# Patient Record
Sex: FEMALE | Race: WHITE | NOT HISPANIC OR LATINO | Employment: OTHER | URBAN - METROPOLITAN AREA
[De-identification: names, ages, dates, MRNs, and addresses within clinical notes are randomized per-mention and may not be internally consistent; named-entity substitution may affect disease eponyms.]

---

## 2017-01-13 ENCOUNTER — HOSPITAL ENCOUNTER (OUTPATIENT)
Dept: CARDIOLOGY | Facility: HOSPITAL | Age: 82
Setting detail: RECURRING SERIES
Discharge: HOME | End: 2017-01-13

## 2017-01-13 PROCEDURE — 36416 COLLJ CAPILLARY BLOOD SPEC: CPT | Performed by: INTERNAL MEDICINE

## 2017-01-13 PROCEDURE — 85610 PROTHROMBIN TIME: CPT | Performed by: INTERNAL MEDICINE

## 2017-01-19 ENCOUNTER — CLINICAL SUPPORT NO REQUIREMENTS (OUTPATIENT)
Dept: CARDIOLOGY | Facility: CLINIC | Age: 82
End: 2017-01-19

## 2017-01-19 DIAGNOSIS — I49.5 SICK SINUS SYNDROME (HCC): Primary | ICD-10-CM

## 2017-01-19 PROCEDURE — 93294 REM INTERROG EVL PM/LDLS PM: CPT | Performed by: INTERNAL MEDICINE

## 2017-01-19 PROCEDURE — 93296 REM INTERROG EVL PM/IDS: CPT | Performed by: INTERNAL MEDICINE

## 2017-01-27 ENCOUNTER — HOSPITAL ENCOUNTER (OUTPATIENT)
Dept: CARDIOLOGY | Facility: HOSPITAL | Age: 82
Setting detail: RECURRING SERIES
Discharge: HOME | End: 2017-01-27

## 2017-01-27 PROCEDURE — 85610 PROTHROMBIN TIME: CPT | Performed by: INTERNAL MEDICINE

## 2017-01-27 PROCEDURE — 36416 COLLJ CAPILLARY BLOOD SPEC: CPT | Performed by: INTERNAL MEDICINE

## 2017-02-06 ENCOUNTER — LAB (OUTPATIENT)
Dept: LAB | Facility: HOSPITAL | Age: 82
End: 2017-02-06

## 2017-02-06 DIAGNOSIS — Z96.653 STATUS POST TOTAL BILATERAL KNEE REPLACEMENT: ICD-10-CM

## 2017-02-06 LAB
ALBUMIN SERPL-MCNC: 4.2 G/DL (ref 3.5–5.2)
ALBUMIN/GLOB SERPL: 1.6 G/DL
ALP SERPL-CCNC: 67 U/L (ref 39–117)
ALT SERPL W P-5'-P-CCNC: 18 U/L (ref 1–33)
ANION GAP SERPL CALCULATED.3IONS-SCNC: 14.8 MMOL/L
AST SERPL-CCNC: 30 U/L (ref 1–32)
BILIRUB SERPL-MCNC: 0.7 MG/DL (ref 0.1–1.2)
BUN BLD-MCNC: 29 MG/DL (ref 8–23)
BUN/CREAT SERPL: 22.3 (ref 7–25)
CALCIUM SPEC-SCNC: 9.3 MG/DL (ref 8.6–10.5)
CHLORIDE SERPL-SCNC: 104 MMOL/L (ref 98–107)
CO2 SERPL-SCNC: 25.2 MMOL/L (ref 22–29)
CREAT BLD-MCNC: 1.3 MG/DL (ref 0.57–1)
CRP SERPL-MCNC: 0.12 MG/DL (ref 0–0.5)
ERYTHROCYTE [SEDIMENTATION RATE] IN BLOOD: 3 MM/HR (ref 0–30)
GFR SERPL CREATININE-BSD FRML MDRD: 39 ML/MIN/1.73
GLOBULIN UR ELPH-MCNC: 2.6 GM/DL
GLUCOSE BLD-MCNC: 65 MG/DL (ref 65–99)
POTASSIUM BLD-SCNC: 4.5 MMOL/L (ref 3.5–5.2)
PROT SERPL-MCNC: 6.8 G/DL (ref 6–8.5)
SODIUM BLD-SCNC: 144 MMOL/L (ref 136–145)

## 2017-02-06 PROCEDURE — 36415 COLL VENOUS BLD VENIPUNCTURE: CPT

## 2017-02-06 PROCEDURE — 85652 RBC SED RATE AUTOMATED: CPT

## 2017-02-06 PROCEDURE — 80053 COMPREHEN METABOLIC PANEL: CPT

## 2017-02-06 PROCEDURE — 86140 C-REACTIVE PROTEIN: CPT

## 2017-02-17 ENCOUNTER — HOSPITAL ENCOUNTER (OUTPATIENT)
Dept: CARDIOLOGY | Facility: HOSPITAL | Age: 82
Setting detail: RECURRING SERIES
Discharge: HOME | End: 2017-02-17

## 2017-02-17 ENCOUNTER — OFFICE VISIT (OUTPATIENT)
Dept: ORTHOPEDIC SURGERY | Facility: CLINIC | Age: 82
End: 2017-02-17

## 2017-02-17 VITALS — BODY MASS INDEX: 40.12 KG/M2 | WEIGHT: 235 LBS | HEIGHT: 64 IN

## 2017-02-17 DIAGNOSIS — Z96.651 HISTORY OF TOTAL KNEE ARTHROPLASTY, RIGHT: Primary | ICD-10-CM

## 2017-02-17 PROCEDURE — 73562 X-RAY EXAM OF KNEE 3: CPT | Performed by: ORTHOPAEDIC SURGERY

## 2017-02-17 PROCEDURE — 85610 PROTHROMBIN TIME: CPT

## 2017-02-17 PROCEDURE — 99213 OFFICE O/P EST LOW 20 MIN: CPT | Performed by: ORTHOPAEDIC SURGERY

## 2017-02-17 PROCEDURE — 36416 COLLJ CAPILLARY BLOOD SPEC: CPT

## 2017-02-17 RX ORDER — DOXYCYCLINE HYCLATE 100 MG/1
100 TABLET, DELAYED RELEASE ORAL DAILY
Qty: 90 TABLET | Refills: 3 | Status: SHIPPED | OUTPATIENT
Start: 2017-02-17 | End: 2017-02-21

## 2017-02-17 NOTE — PROGRESS NOTES
Patient: Caitlyn Longoria  YOB: 1934 82 y.o. female  Medical Record Number: [id number]    Chief Complaints:   Chief Complaint   Patient presents with   • Right Knee - Follow-up       History of Present Illness:Caitlyn Longoria is a 82 y.o. female who presents for follow-up of  right total knee replacement she is about 2 and half years out from an infection treated by I&D poly-change.  She is currently on doxycycline for by mouth suppression which she tried to come off the doxycycline but developed increasing pain within the knee she's back on 100 mg daily.  She is doing well she has no complaints with regard to the knee.    Allergies:   Allergies   Allergen Reactions   • Amoxicillin    • Clarithromycin      Other reaction(s): Headache   • Diclofenac    • Diphenhydramine    • Esomeprazole    • Levalbuterol    • Levocetirizine    • Lipitor [Atorvastatin]    • Omeprazole    • Quinapril Swelling and Confusion   • Sulfa Antibiotics    • Sulindac      Other reaction(s): Headache   • Valdecoxib Irritability   • Prednisone Rash       Medications:   Current Outpatient Prescriptions   Medication Sig Dispense Refill   • acetaminophen (TYLENOL) 650 MG 8 hr tablet Take 650 mg by mouth. PRN     • azelastine (ASTEPRO) 0.15 % solution nasal spray 2 sprays into each nostril 2 (two) times a day.     • azelastine (OPTIVAR) 0.05 % ophthalmic solution Apply  to eye 2 (two) times a day.     • BIOTIN MAXIMUM STRENGTH PO Take  by mouth Daily.     • bumetanide (BUMEX) 1 MG tablet Take  by mouth daily.     • cefdinir (OMNICEF) 300 MG capsule Take 300 mg by mouth 2 (Two) Times a Day.     • cetirizine (ZyrTEC) 10 MG tablet Take  by mouth daily.     • clotrimazole-betamethasone (LOTRISONE) 1-0.05 % cream Apply  topically Daily.     • Cyanocobalamin (B-12) 1000 MCG/ML kit Inject  as directed 1 (one) time per week.     • doxycycline (DORYX) 100 MG enteric coated tablet Take 1 tablet by mouth 2 (two) times a day. (Patient taking  "differently: Take 100 mg by mouth Daily.) 60 tablet 3   • fenofibrate (TRICOR) 145 MG tablet Take 145 mg by mouth Daily.     • flunisolide (NASALIDE) 25 MCG/ACT (0.025%) solution nasal spray Inhale 2 sprays every 12 (twelve) hours.     • glipiZIDE (GLUCOTROL) 5 MG tablet Take 5 mg by mouth daily.     • melatonin tablet Take 1 mg by mouth Every Night.     • oxazepam (SERAX) 10 MG capsule Take  by mouth Daily.     • oxybutynin XL (DITROPAN-XL) 10 MG 24 hr tablet Take 10 mg by mouth Daily.     • pantoprazole (PROTONIX) 40 MG EC tablet Take  by mouth daily.     • potassium chloride (K-DUR,KLOR-CON) 20 MEQ CR tablet Take 20 mEq by mouth Daily.     • warfarin (COUMADIN) 4 MG tablet TAKE ONE TABLET BY MOUTH DAILY OR AS DIRECTED 90 tablet 1     No current facility-administered medications for this visit.          The following portions of the patient's history were reviewed and updated as appropriate: allergies, current medications, past family history, past medical history, past social history, past surgical history and problem list.    Review of Systems:   A 14 point review of systems was performed. All systems negative except pertinent positives/negative listed in HPI above    Physical Exam:   Vitals:    02/17/17 1528   Weight: 235 lb (107 kg)   Height: 64\" (162.6 cm)       General: A and O x 3, ASA, NAD    SCLERA:    Normal    DENTITION:   Normal  The knee shows good range of motion there is no joint effusion there is no erythema or warmth is nontender with range of motion there is no ligamentous instability    Radiology:  Xrays 3views right knee (ap,lateral, sunrise) were ordered and reviewed for evaluation of knee pain demonstratinga well positioned knee replacement without evidence of wear, loosening or osteolysis  todays xrays were compared to previous xrays and demonstrate no change    Assessment/Plan:  Right total knee replacement about 2-1/2 years out from irrigation debridement and poly-change.  She is currently " managed on doxycycline 100 mg once daily.  When she discontinued it after about 4 days she had pain so for the foreseeable future we will have her on doxycycline 100 mg once daily.  Her most recent sedimentation rate and C-reactive proteins are normal from a couple weeks ago so overall everything looks very good.  We will keep her on her current antibiotic regimen would like to see her back in 1 year sooner should she have increased pain redness or change in symptoms about either knee.

## 2017-02-20 ENCOUNTER — TELEPHONE (OUTPATIENT)
Dept: ORTHOPEDIC SURGERY | Facility: CLINIC | Age: 82
End: 2017-02-20

## 2017-02-21 RX ORDER — DOXYCYCLINE HYCLATE 100 MG
100 TABLET ORAL 2 TIMES DAILY
Qty: 90 TABLET | Refills: 3 | Status: SHIPPED | OUTPATIENT
Start: 2017-02-21 | End: 2017-04-07

## 2017-02-22 ENCOUNTER — TELEPHONE (OUTPATIENT)
Dept: ORTHOPEDIC SURGERY | Facility: CLINIC | Age: 82
End: 2017-02-22

## 2017-02-22 ENCOUNTER — TELEPHONE (OUTPATIENT)
Dept: CARDIOLOGY | Facility: CLINIC | Age: 82
End: 2017-02-22

## 2017-02-22 NOTE — TELEPHONE ENCOUNTER
SPOKE WITH JASPER AT [facility]    DOXYCYCLINE 100 MG    APPROVED FOR LIFETIME OF PATIENT PLAN  2/20/17-2/31/17    SPOKE WITH MELVA AT [facility]

## 2017-02-22 NOTE — TELEPHONE ENCOUNTER
Pt notified.  She states when she finishes the Tricor she will have  May change her to Faisal./ KOBI

## 2017-02-22 NOTE — TELEPHONE ENCOUNTER
Patient sent letter asking you would recommend changing from Tricor to Crestor.   It is the same cost to her and she is with the understanding the Crestor is best.  (Please see patient's letter in your inbox)/ KOBI    Patient's phone number is [phone]

## 2017-02-28 RX ORDER — DOXYCYCLINE HYCLATE 100 MG
100 TABLET ORAL 2 TIMES DAILY
Qty: 90 TABLET | Refills: 3 | Status: CANCELLED | OUTPATIENT
Start: 2017-02-28 | End: 2017-04-14

## 2017-03-17 ENCOUNTER — HOSPITAL ENCOUNTER (OUTPATIENT)
Dept: CARDIOLOGY | Facility: HOSPITAL | Age: 82
Setting detail: RECURRING SERIES
Discharge: HOME | End: 2017-03-17

## 2017-03-17 PROCEDURE — 85610 PROTHROMBIN TIME: CPT

## 2017-03-17 PROCEDURE — 36416 COLLJ CAPILLARY BLOOD SPEC: CPT

## 2017-03-24 ENCOUNTER — OFFICE VISIT (OUTPATIENT)
Dept: ORTHOPEDIC SURGERY | Facility: CLINIC | Age: 82
End: 2017-03-24

## 2017-03-24 VITALS — HEIGHT: 64 IN | TEMPERATURE: 97.6 F | BODY MASS INDEX: 39.95 KG/M2 | WEIGHT: 234 LBS

## 2017-03-24 DIAGNOSIS — G56.02 CARPAL TUNNEL SYNDROME OF LEFT WRIST: ICD-10-CM

## 2017-03-24 DIAGNOSIS — M54.2 NECK AND SHOULDER PAIN: Primary | ICD-10-CM

## 2017-03-24 DIAGNOSIS — M25.519 NECK AND SHOULDER PAIN: Primary | ICD-10-CM

## 2017-03-24 DIAGNOSIS — M19.019 ARTHROPATHY OF SHOULDER REGION: ICD-10-CM

## 2017-03-24 PROCEDURE — 99213 OFFICE O/P EST LOW 20 MIN: CPT | Performed by: NURSE PRACTITIONER

## 2017-03-24 PROCEDURE — 72040 X-RAY EXAM NECK SPINE 2-3 VW: CPT | Performed by: NURSE PRACTITIONER

## 2017-03-24 PROCEDURE — 73030 X-RAY EXAM OF SHOULDER: CPT | Performed by: NURSE PRACTITIONER

## 2017-03-24 PROCEDURE — 20610 DRAIN/INJ JOINT/BURSA W/O US: CPT | Performed by: NURSE PRACTITIONER

## 2017-03-24 RX ORDER — METHYLPREDNISOLONE ACETATE 80 MG/ML
80 INJECTION, SUSPENSION INTRA-ARTICULAR; INTRALESIONAL; INTRAMUSCULAR; SOFT TISSUE
Status: COMPLETED | OUTPATIENT
Start: 2017-03-24 | End: 2017-03-24

## 2017-03-24 RX ORDER — MELATONIN
1000 DAILY
COMMUNITY
End: 2017-10-18 | Stop reason: ALTCHOICE

## 2017-03-24 RX ORDER — ROSUVASTATIN CALCIUM 10 MG/1
10 TABLET, COATED ORAL DAILY
Status: ON HOLD | COMMUNITY
Start: 2017-03-15 | End: 2017-12-07 | Stop reason: ALTCHOICE

## 2017-03-24 RX ORDER — CHLORAL HYDRATE 500 MG
1000 CAPSULE ORAL 2 TIMES DAILY WITH MEALS
COMMUNITY
End: 2017-12-10 | Stop reason: HOSPADM

## 2017-03-24 RX ORDER — LIDOCAINE HYDROCHLORIDE 10 MG/ML
2 INJECTION, SOLUTION INFILTRATION; PERINEURAL
Status: COMPLETED | OUTPATIENT
Start: 2017-03-24 | End: 2017-03-24

## 2017-03-24 RX ORDER — BUPIVACAINE HYDROCHLORIDE 5 MG/ML
2 INJECTION, SOLUTION PERINEURAL
Status: COMPLETED | OUTPATIENT
Start: 2017-03-24 | End: 2017-03-24

## 2017-03-24 RX ADMIN — BUPIVACAINE HYDROCHLORIDE 2 ML: 5 INJECTION, SOLUTION PERINEURAL at 10:00

## 2017-03-24 RX ADMIN — LIDOCAINE HYDROCHLORIDE 2 ML: 10 INJECTION, SOLUTION INFILTRATION; PERINEURAL at 10:00

## 2017-03-24 RX ADMIN — METHYLPREDNISOLONE ACETATE 80 MG: 80 INJECTION, SUSPENSION INTRA-ARTICULAR; INTRALESIONAL; INTRAMUSCULAR; SOFT TISSUE at 10:00

## 2017-03-24 NOTE — PROGRESS NOTES
Patient Name: Caitlyn Longoria   YOB: 1934  Referring Primary Care Physician: Mele Hays MD      Chief Complaint:    Chief Complaint   Patient presents with   • Left Arm - Establish Care, Pain    left wrist and neck pain    Subjective:    HPI:   Caitlyn Longoria is a pleasant 82 y.o. year old who presents today for evaluation of   Chief Complaint   Patient presents with   • Left Arm - Establish Care, Pain   .  SHOULDER: left shoulder pain that is Acute on chronic. The pain is described as aching, shooting and tingling, and located at global, acromioclavicular joint, neck.  The pain is exacerbated by repetitive movements, overhead movements, and lying on the shoulder.  Mechanism of injury: None. Previous treatment has included cortisone injections  OTC Tylenol This acute flare has been bad for about 3 months now and is waking her up at night.  She is also c/o numbness and tingling in her left hand and some cervical neck pain that is intermittent and achy and relieved some by chiropracter adjustment.     This problem is not new to this examiner.     Medications:   Home Medications:  Current Outpatient Prescriptions on File Prior to Visit   Medication Sig   • acetaminophen (TYLENOL) 650 MG 8 hr tablet Take 650 mg by mouth. PRN   • azelastine (ASTEPRO) 0.15 % solution nasal spray 2 sprays into each nostril 2 (two) times a day.   • azelastine (OPTIVAR) 0.05 % ophthalmic solution Apply  to eye 2 (two) times a day.   • BIOTIN MAXIMUM STRENGTH PO Take  by mouth Daily.   • bumetanide (BUMEX) 1 MG tablet Take  by mouth daily.   • cetirizine (ZyrTEC) 10 MG tablet Take  by mouth daily.   • clotrimazole-betamethasone (LOTRISONE) 1-0.05 % cream Apply  topically Daily.   • Cyanocobalamin (B-12) 1000 MCG/ML kit Inject  as directed 1 (one) time per week.   • doxycycline (VIBRAMYICN) 100 MG tablet Take 1 tablet by mouth 2 (Two) Times a Day for 90 doses.   • flunisolide (NASALIDE) 25 MCG/ACT (0.025%) solution  nasal spray Inhale 2 sprays every 12 (twelve) hours.   • glipiZIDE (GLUCOTROL) 5 MG tablet Take 5 mg by mouth daily.   • melatonin tablet Take 1 mg by mouth Every Night.   • oxazepam (SERAX) 10 MG capsule Take  by mouth Daily.   • oxybutynin XL (DITROPAN-XL) 10 MG 24 hr tablet Take 10 mg by mouth Daily.   • pantoprazole (PROTONIX) 40 MG EC tablet Take  by mouth daily.   • potassium chloride (K-DUR,KLOR-CON) 20 MEQ CR tablet Take 20 mEq by mouth Daily.   • warfarin (COUMADIN) 4 MG tablet TAKE ONE TABLET BY MOUTH DAILY OR AS DIRECTED   • cefdinir (OMNICEF) 300 MG capsule Take 300 mg by mouth 2 (Two) Times a Day.   • [DISCONTINUED] fenofibrate (TRICOR) 145 MG tablet Take 145 mg by mouth Daily.     No current facility-administered medications on file prior to visit.      Current Medications:  Scheduled Meds:  Continuous Infusions:  No current facility-administered medications for this visit.   PRN Meds:.    I have reviewed the patient's medical history in detail and updated the computerized patient record.  Review and summarization of old records includes:    Past Medical History:   Diagnosis Date   • Abdominal pain    • Cough    • Diarrhea    • Difficulty breathing    • Dizziness    • DM type 2 (diabetes mellitus, type 2)         Past Surgical History:   Procedure Laterality Date   • CARDIAC CATHETERIZATION     • CHOLECYSTECTOMY     • CORONARY STENT PLACEMENT     • HERNIA REPAIR     • HYSTERECTOMY     • PACEMAKER IMPLANTATION     • REPLACEMENT TOTAL KNEE          Social History     Occupational History   • Not on file.     Social History Main Topics   • Smoking status: Never Smoker   • Smokeless tobacco: Never Used   • Alcohol use No   • Drug use: No   • Sexual activity: Not on file    Social History     Social History Narrative      No family history on file.    ROS: 14 point review of systems was performed and all other systems were reviewed and are negative except for documented findings in HPI and today's  encounter.     Allergies:   Allergies   Allergen Reactions   • Ahist [Chlorpheniramine]    • Amoxicillin    • Clarithromycin      Other reaction(s): Headache   • Diclofenac      Voltaren   • Diphenhydramine      Benadryl   • Esomeprazole      Nexium   • Ibuprofen Other (See Comments)     Does not recall    • Levalbuterol      Xopenex   • Levocetirizine      Xyzal   • Lipitor [Atorvastatin]    • Lodine [Etodolac]    • Omeprazole      Prilosec   • Pravachol [Pravastatin]    • Quinapril Swelling and Confusion     Accupril   • Sulfa Antibiotics    • Sulindac      Other reaction(s): Headache   • Valdecoxib Irritability   • Prednisone Rash     Constitutional:  Denies fever, shaking or chills   Eyes:  Denies change in visual acuity   HENT:  Denies nasal congestion or sore throat   Respiratory:  Denies cough or shortness of breath   Cardiovascular:  Denies chest pain or severe LE edema   GI:  Denies abdominal pain, nausea, vomiting, bloody stools or diarrhea   Musculoskeletal:  Numbness, tingling, or loss of motor function only as noted above in history of present illness.  : Denies painful urination or hematuria  Integument:  Denies rash, lesion or ulceration   Neurologic:  Denies headache or focal weakness  Endocrine:  Denies lymphadenopathy  Psych:  Denies confusion or change in mental status   Hem:  Denies active bleeding    Objective:    Physical Exam: 82 y.o. female  Body mass index is 40.17 kg/(m^2).  Vitals:    03/24/17 0827   Temp: 97.6 °F (36.4 °C)     Vital signs reviewed.   General Appearance:    Alert, cooperative, in no acute distress                  Eyes: conjunctiva clear  ENT: external ears and nose atraumatic  CV: no peripheral edema  Resp: normal respiratory effort  Skin: no rashes or wounds; normal turgor  Psych: mood and affect appropriate  Lymph: no nodes appreciated  Neuro: gross sensation intact  Vascular:  Palpable peripheral pulse in noted extremity  Musculoskeletal Extremities: SHOULDER Exam:  tenderness over the glenohumeral joint, positive for tenderness about the glenohumeral joint, positive for tenderness over the acromioclavicular joint, positive for impingement sign, diminished sensation in hand with Tinnel's test positive, pain with mild percussion of dorsal surface of wrist on extension, cap refill <3, also pain in cervical neck that is positional with radiation down left arm., diminished motor function in overhead motion and radial pulse intact.    Radiology:   Imaging done today and discussed at length with the patient:    Indication: pain related symptoms,  Views: 3V AP, SCAPULA Y AND AXILLARY left shoulder(s) and 2V AP&LAT, of cervical spine .    Findings: rotator cuff arthropathy  and a/c joint arthritis, cervical spine arthritis with degenerative disc disease and C5-C6 arthritis  Comparison views: viewed last xray done in the office.       Assessment:   Patient Active Problem List   Diagnosis   • Neck and shoulder pain   • Arthropathy of shoulder region   • Carpal tunnel syndrome of left wrist        Large Joint Arthrocentesis  Date/Time: 3/24/2017 10:00 AM  Consent given by: patient  Site marked: site marked  Timeout: Immediately prior to procedure a time out was called to verify the correct patient, procedure, equipment, support staff and site/side marked as required   Supporting Documentation  Indications: pain and joint swelling   Procedure Details  Location: shoulder - L subacromial bursa  Preparation: Patient was prepped and draped in the usual sterile fashion  Needle size: 22 G  Approach: anterolateral  Medications administered: 2 mL bupivacaine; 2 mL lidocaine 1 %; 80 mg methylPREDNISolone acetate 80 MG/ML  Patient tolerance: patient tolerated the procedure well with no immediate complications             Plan: Biomechanics of pertinent body area discussed.  Risks, benefits, alternatives, comparisons, and complications of accepted medicines, injections, recommendations, surgical  procedures, and therapies explained and education provided in laymen's terms. The patient was given the opportunity to ask questions and they were answerved to their satisfaction.   Natural history and expected course discussed. Questions answered.  Advice on benefits of, and types of regular/moderate exercise.  Lifestyle measures for weight loss with biomechanical explanations for weight loss and how this affects orthopedic condition.  Cortisone Injection for DIAGNOSTIC and THERAPUTIC purposes.  PT referral.  30 min spent face to face with patient >25 min spent counseling about natural history and expected course of assessed complaint. Questions answered.  Left wrist brace for carpul tunnel, will do cortisone inj in left shoulder, and see how much this helps with the symptoms coming from her neck as well. will order some PT for the neck and shoulders, if not improved should follow up with Dr. Addison for her neck.       3/24/2017  MJR

## 2017-03-24 NOTE — PATIENT INSTRUCTIONS
Shoulder Range of Motion Exercises  Shoulder range of motion (ROM) exercises are designed to keep the shoulder moving freely. They are often recommended for people who have shoulder pain.  MOVEMENT EXERCISE  When you are able, do this exercise 5-6 days per week, or as told by your health care provider. Work toward doing 2 sets of 10 swings.  Pendulum Exercise  How To Do This Exercise Lying Down  1. Lie face-down on a bed with your abdomen close to the side of the bed.  2. Let your arm hang over the side of the bed.  3. Relax your shoulder, arm, and hand.  4. Slowly and gently swing your arm forward and back. Do not use your neck muscles to swing your arm. They should be relaxed. If you are struggling to swing your arm, have someone gently swing it for you. When you do this exercise for the first time, swing your arm at a 15 degree angle for 15 seconds, or swing your arm 10 times. As pain lessens over time, increase the angle of the swing to 30-45 degrees.  5. Repeat steps 1-4 with the other arm.  How To Do This Exercise While Standing  1. Stand next to a sturdy chair or table and hold on to it with your hand.  ¨ Bend forward at the waist.  ¨ Bend your knees slightly.  ¨ Relax your other arm and let it hang limp.  ¨ Relax the shoulder blade of the arm that is hanging and let it drop.  ¨ While keeping your shoulder relaxed, use body motion to swing your arm in small circles. The first time you do this exercise, swing your arm for about 30 seconds or 10 times. When you do it next time, swing your arm for a little longer.  ¨ Stand up tall and relax.  ¨ Repeat steps 1-7, this time changing the direction of the circles.  2. Repeat steps 1-8 with the other arm.  STRETCHING EXERCISES  Do these exercises 3-4 times per day on 5-6 days per week or as told by your health care provider. Work toward holding the stretch for 20 seconds.  Stretching Exercise 1  1. Lift your arm straight out in front of you.  2. Bend your arm 90  "degrees at the elbow (right angle) so your forearm goes across your body and looks like the letter \"L.\"  3. Use your other arm to gently pull the elbow forward and across your body.  4. Repeat steps 1-3 with the other arm.  Stretching Exercise 2  You will need a towel or rope for this exercise.  1. Bend one arm behind your back with the palm facing outward.  2. Hold a towel with your other hand.  3. Reach the arm that holds the towel above your head, and bend that arm at the elbow. Your wrist should be behind your neck.  4. Use your free hand to grab the free end of the towel.  5. With the higher hand, gently pull the towel up behind you.  6. With the lower hand, pull the towel down behind you.  7. Repeat steps 1-6 with the other arm.  STRENGTHENING EXERCISES  Do each of these exercises at four different times of day (sessions) every day or as told by your health care provider. To begin with, repeat each exercise 5 times (repetitions). Work toward doing 3 sets of 12 repetitions or as told by your health care provider.  Strengthening Exercise 1  You will need a light weight for this activity. As you grow stronger, you may use a heavier weight.  1. Standing with a weight in your hand, lift your arm straight out to the side until it is at the same height as your shoulder.  2. Bend your arm at 90 degrees so that your fingers are pointing to the ceiling.  3. Slowly raise your hand until your arm is straight up in the air.  4. Repeat steps 1-3 with the other arm.  Strengthening Exercise 2  You will need a light weight for this activity. As you grow stronger, you may use a heavier weight.  1. Standing with a weight in your hand, gradually move your straight arm in an arc, starting at your side, then out in front of you, then straight up over your head.  2. Gradually move your other arm in an arc, starting at your side, then out in front of you, then straight up over your head.  3. Repeat steps 1-2 with the other " arm.  Strengthening Exercise 3  You will need an elastic band for this activity. As you grow stronger, gradually increase the size of the bands or increase the number of bands that you use at one time.  1. While standing, hold an elastic band in one hand and raise that arm up in the air.  2. With your other hand, pull down the band until that hand is by your side.  3. Repeat steps 1-2 with the other arm.     This information is not intended to replace advice given to you by your health care provider. Make sure you discuss any questions you have with your health care provider.     Document Released: 09/15/2004 Document Revised: 05/03/2016 Document Reviewed: 12/14/2015  [employer] Interactive Patient Education ©2016 [employer]

## 2017-03-31 ENCOUNTER — HOSPITAL ENCOUNTER (OUTPATIENT)
Dept: CARDIOLOGY | Facility: HOSPITAL | Age: 82
Setting detail: RECURRING SERIES
Discharge: HOME | End: 2017-03-31

## 2017-03-31 PROCEDURE — 36416 COLLJ CAPILLARY BLOOD SPEC: CPT

## 2017-03-31 PROCEDURE — 85610 PROTHROMBIN TIME: CPT

## 2017-04-24 ENCOUNTER — CLINICAL SUPPORT NO REQUIREMENTS (OUTPATIENT)
Dept: CARDIOLOGY | Facility: CLINIC | Age: 82
End: 2017-04-24

## 2017-04-24 DIAGNOSIS — I49.5 SICK SINUS SYNDROME (HCC): Primary | ICD-10-CM

## 2017-04-24 PROCEDURE — 93280 PM DEVICE PROGR EVAL DUAL: CPT | Performed by: INTERNAL MEDICINE

## 2017-04-28 ENCOUNTER — HOSPITAL ENCOUNTER (OUTPATIENT)
Dept: CARDIOLOGY | Facility: HOSPITAL | Age: 82
Setting detail: RECURRING SERIES
Discharge: HOME | End: 2017-04-28

## 2017-04-28 PROCEDURE — 36416 COLLJ CAPILLARY BLOOD SPEC: CPT

## 2017-04-28 PROCEDURE — 85610 PROTHROMBIN TIME: CPT

## 2017-05-01 ENCOUNTER — HOSPITAL ENCOUNTER (OUTPATIENT)
Dept: CARDIOLOGY | Facility: HOSPITAL | Age: 82
Setting detail: RECURRING SERIES
Discharge: HOME | End: 2017-05-01

## 2017-05-01 PROCEDURE — 85610 PROTHROMBIN TIME: CPT

## 2017-05-01 PROCEDURE — 36416 COLLJ CAPILLARY BLOOD SPEC: CPT

## 2017-05-08 ENCOUNTER — HOSPITAL ENCOUNTER (OUTPATIENT)
Dept: CARDIOLOGY | Facility: HOSPITAL | Age: 82
Setting detail: RECURRING SERIES
Discharge: HOME | End: 2017-05-08

## 2017-05-08 PROCEDURE — 36416 COLLJ CAPILLARY BLOOD SPEC: CPT

## 2017-05-08 PROCEDURE — 85610 PROTHROMBIN TIME: CPT

## 2017-05-22 ENCOUNTER — OFFICE (OUTPATIENT)
Dept: URBAN - METROPOLITAN AREA OTHER 6 | Facility: OTHER | Age: 82
End: 2017-05-22
Payer: COMMERCIAL

## 2017-05-22 ENCOUNTER — HOSPITAL ENCOUNTER (OUTPATIENT)
Dept: CARDIOLOGY | Facility: HOSPITAL | Age: 82
Setting detail: RECURRING SERIES
Discharge: HOME | End: 2017-05-22

## 2017-05-22 VITALS
DIASTOLIC BLOOD PRESSURE: 94 MMHG | HEIGHT: 65 IN | WEIGHT: 228 LBS | HEART RATE: 116 BPM | SYSTOLIC BLOOD PRESSURE: 146 MMHG

## 2017-05-22 DIAGNOSIS — K21.9 GASTRO-ESOPHAGEAL REFLUX DISEASE WITHOUT ESOPHAGITIS: ICD-10-CM

## 2017-05-22 DIAGNOSIS — Z86.010 PERSONAL HISTORY OF COLONIC POLYPS: ICD-10-CM

## 2017-05-22 PROCEDURE — 99213 OFFICE O/P EST LOW 20 MIN: CPT | Performed by: INTERNAL MEDICINE

## 2017-05-22 PROCEDURE — 36416 COLLJ CAPILLARY BLOOD SPEC: CPT

## 2017-05-22 PROCEDURE — 85610 PROTHROMBIN TIME: CPT

## 2017-05-24 ENCOUNTER — TRANSCRIBE ORDERS (OUTPATIENT)
Dept: ADMINISTRATIVE | Facility: HOSPITAL | Age: 82
End: 2017-05-24

## 2017-05-24 DIAGNOSIS — J04.0 REFLUX LARYNGITIS: Primary | ICD-10-CM

## 2017-05-24 DIAGNOSIS — K21.9 REFLUX LARYNGITIS: Primary | ICD-10-CM

## 2017-06-06 ENCOUNTER — HOSPITAL ENCOUNTER (OUTPATIENT)
Dept: GENERAL RADIOLOGY | Facility: HOSPITAL | Age: 82
Discharge: HOME | End: 2017-06-06
Attending: INTERNAL MEDICINE | Admitting: INTERNAL MEDICINE

## 2017-06-06 DIAGNOSIS — K21.9 REFLUX LARYNGITIS: ICD-10-CM

## 2017-06-06 DIAGNOSIS — J04.0 REFLUX LARYNGITIS: ICD-10-CM

## 2017-06-06 PROCEDURE — 74247: CPT

## 2017-06-19 ENCOUNTER — HOSPITAL ENCOUNTER (OUTPATIENT)
Dept: CARDIOLOGY | Facility: HOSPITAL | Age: 82
Setting detail: RECURRING SERIES
Discharge: HOME | End: 2017-06-19

## 2017-06-19 PROCEDURE — 36416 COLLJ CAPILLARY BLOOD SPEC: CPT

## 2017-06-19 PROCEDURE — 85610 PROTHROMBIN TIME: CPT

## 2017-06-27 ENCOUNTER — CLINICAL SUPPORT (OUTPATIENT)
Dept: ORTHOPEDIC SURGERY | Facility: CLINIC | Age: 82
End: 2017-06-27

## 2017-06-27 VITALS — HEIGHT: 64 IN | TEMPERATURE: 97.6 F | WEIGHT: 234 LBS | BODY MASS INDEX: 39.95 KG/M2

## 2017-06-27 DIAGNOSIS — G89.29 CHRONIC PAIN OF BOTH SHOULDERS: Primary | ICD-10-CM

## 2017-06-27 DIAGNOSIS — M25.511 CHRONIC PAIN OF BOTH SHOULDERS: Primary | ICD-10-CM

## 2017-06-27 DIAGNOSIS — M25.512 CHRONIC PAIN OF BOTH SHOULDERS: Primary | ICD-10-CM

## 2017-06-27 DIAGNOSIS — M19.019 ARTHROPATHY OF SHOULDER REGION: ICD-10-CM

## 2017-06-27 PROCEDURE — 20610 DRAIN/INJ JOINT/BURSA W/O US: CPT | Performed by: NURSE PRACTITIONER

## 2017-06-27 PROCEDURE — 99212 OFFICE O/P EST SF 10 MIN: CPT | Performed by: NURSE PRACTITIONER

## 2017-06-27 RX ORDER — METHYLPREDNISOLONE ACETATE 80 MG/ML
80 INJECTION, SUSPENSION INTRA-ARTICULAR; INTRALESIONAL; INTRAMUSCULAR; SOFT TISSUE
Status: COMPLETED | OUTPATIENT
Start: 2017-06-27 | End: 2017-06-27

## 2017-06-27 RX ORDER — BUPIVACAINE HYDROCHLORIDE 5 MG/ML
2 INJECTION, SOLUTION PERINEURAL
Status: COMPLETED | OUTPATIENT
Start: 2017-06-27 | End: 2017-06-27

## 2017-06-27 RX ORDER — LIDOCAINE HYDROCHLORIDE 10 MG/ML
2 INJECTION, SOLUTION INFILTRATION; PERINEURAL
Status: COMPLETED | OUTPATIENT
Start: 2017-06-27 | End: 2017-06-27

## 2017-06-27 RX ADMIN — LIDOCAINE HYDROCHLORIDE 2 ML: 10 INJECTION, SOLUTION INFILTRATION; PERINEURAL at 13:39

## 2017-06-27 RX ADMIN — METHYLPREDNISOLONE ACETATE 80 MG: 80 INJECTION, SUSPENSION INTRA-ARTICULAR; INTRALESIONAL; INTRAMUSCULAR; SOFT TISSUE at 13:39

## 2017-06-27 RX ADMIN — BUPIVACAINE HYDROCHLORIDE 2 ML: 5 INJECTION, SOLUTION PERINEURAL at 13:39

## 2017-06-27 NOTE — PROGRESS NOTES
Shoulder Follow Up  Patient: Caitlyn Longoria  YOB: 1934  Date of Service: 6/27/2017    Chief Complaints: Shoulder pain    SUBJECTIVE:    History of Present Illness: Pt is seen in the office today with complaint of Chief Complaint   Patient presents with   • Left Shoulder - Follow-up   • Right Shoulder - Follow-up   . Pt has had shoulder injections in the past that have helped to relieve pain, and swelling and increase rom.  The pain is daily, generalized in the shoulder, and is chronic in nature, and is worsened by overhead motion. Pt gets intermittent shoulder injections with good relief. Is here for repeat injection. Was doing PT but flared up shoulders really bad, discussed restarting at a really low intensity as tolerated.     Allergies:   Allergies   Allergen Reactions   • Ahist [Chlorpheniramine]    • Amoxicillin    • Clarithromycin      Other reaction(s): Headache   • Diclofenac      Voltaren   • Diphenhydramine      Benadryl   • Esomeprazole      Nexium   • Ibuprofen Other (See Comments)     Does not recall    • Levalbuterol      Xopenex   • Levocetirizine      Xyzal   • Lipitor [Atorvastatin]    • Lodine [Etodolac]    • Omeprazole      Prilosec   • Pravachol [Pravastatin]    • Quinapril Swelling and Confusion     Accupril   • Sulfa Antibiotics    • Sulindac      Other reaction(s): Headache   • Valdecoxib Irritability   • Prednisone Rash       Medications:   Home Medications:  Current Outpatient Prescriptions on File Prior to Visit   Medication Sig   • acetaminophen (TYLENOL) 650 MG 8 hr tablet Take 650 mg by mouth. PRN   • Acetylcysteine (NAC PO) Take  by mouth.   • azelastine (ASTEPRO) 0.15 % solution nasal spray 2 sprays into each nostril 2 (two) times a day.   • azelastine (OPTIVAR) 0.05 % ophthalmic solution Apply  to eye 2 (two) times a day.   • BIOTIN MAXIMUM STRENGTH PO Take  by mouth Daily.   • bumetanide (BUMEX) 1 MG tablet Take  by mouth daily.   • cefdinir (OMNICEF) 300 MG  capsule Take 300 mg by mouth 2 (Two) Times a Day.   • cetirizine (ZyrTEC) 10 MG tablet Take  by mouth daily.   • cholecalciferol (VITAMIN D3) 1000 UNITS tablet Take 1,000 Units by mouth Daily.   • clotrimazole-betamethasone (LOTRISONE) 1-0.05 % cream Apply  topically Daily.   • Cyanocobalamin (B-12) 1000 MCG/ML kit Inject  as directed 1 (one) time per week.   • flunisolide (NASALIDE) 25 MCG/ACT (0.025%) solution nasal spray Inhale 2 sprays every 12 (twelve) hours.   • glipiZIDE (GLUCOTROL) 5 MG tablet Take 5 mg by mouth daily.   • Loperamide HCl (IMODIUM PO) Take  by mouth.   • melatonin tablet Take 1 mg by mouth Every Night.   • Omega-3 Fatty Acids (FISH OIL) 1000 MG capsule capsule Take  by mouth Daily With Breakfast.   • oxazepam (SERAX) 10 MG capsule Take  by mouth Daily.   • oxybutynin XL (DITROPAN-XL) 10 MG 24 hr tablet Take 10 mg by mouth Daily.   • pantoprazole (PROTONIX) 40 MG EC tablet Take  by mouth 2 (Two) Times a Day.   • potassium chloride (K-DUR,KLOR-CON) 20 MEQ CR tablet Take 20 mEq by mouth Daily.   • Probiotic Product (PROBIOTIC PO) Take  by mouth.   • rosuvastatin (CRESTOR) 10 MG tablet    • warfarin (COUMADIN) 4 MG tablet TAKE ONE TABLET BY MOUTH DAILY OR AS DIRECTED     No current facility-administered medications on file prior to visit.      Current Medications:  Scheduled Meds:  Continuous Infusions:  No current facility-administered medications for this visit.   PRN Meds:.    I have reviewed the patient's medical history in detail and updated the computerized patient record.  Review and summarization of old records include:    Past Medical History:   Diagnosis Date   • Abdominal pain    • Cough    • Diarrhea    • Difficulty breathing    • Dizziness    • DM type 2 (diabetes mellitus, type 2)         Past Surgical History:   Procedure Laterality Date   • CARDIAC CATHETERIZATION     • CHOLECYSTECTOMY     • CORONARY STENT PLACEMENT     • HERNIA REPAIR     • HYSTERECTOMY     • PACEMAKER  IMPLANTATION     • REPLACEMENT TOTAL KNEE          Social History     Occupational History   • Not on file.     Social History Main Topics   • Smoking status: Never Smoker   • Smokeless tobacco: Never Used   • Alcohol use No   • Drug use: No   • Sexual activity: Not on file    Social History     Social History Narrative      No family history on file.    ROS: 14 point review of systems was performed and was negative except for documented findings in HPI and today's encounter.     Allergies:   Allergies   Allergen Reactions   • Ahist [Chlorpheniramine]    • Amoxicillin    • Clarithromycin      Other reaction(s): Headache   • Diclofenac      Voltaren   • Diphenhydramine      Benadryl   • Esomeprazole      Nexium   • Ibuprofen Other (See Comments)     Does not recall    • Levalbuterol      Xopenex   • Levocetirizine      Xyzal   • Lipitor [Atorvastatin]    • Lodine [Etodolac]    • Omeprazole      Prilosec   • Pravachol [Pravastatin]    • Quinapril Swelling and Confusion     Accupril   • Sulfa Antibiotics    • Sulindac      Other reaction(s): Headache   • Valdecoxib Irritability   • Prednisone Rash     Constitutional:  Denies fever, shaking or chills   Eyes:  Denies change in visual acuity   HENT:  Denies nasal congestion or sore throat   Respiratory:  Denies cough or shortness of breath   Cardiovascular:  Denies chest pain or severe LE edema   GI:  Denies abdominal pain, nausea, vomiting, bloody stools or diarrhea   Musculoskeletal:  Numbness, tingling, or loss of motor function only as noted above in history of present illness.  : Denies painful urination or hematuria  Integument:  Denies rash, lesion or ulceration   Neurologic:  Denies headache or focal weakness  Endocrine:  Denies lymphadenopathy  Psych:  Denies confusion or change in mental status   Hem:  Denies active bleeding    OBJECTIVE:    Physical Exam: 82 y.o. female  Body mass index is 40.17 kg/(m^2).  Vitals:    06/27/17 1337   Temp: 97.6 °F (36.4 °C)        General Appearance:    Alert, cooperative, in no acute distress                    Patient is alert and oriented ×3, no acute distress, appears at above-listed height, weight, and age.  Affect is normal, respiratory rate is normal, and unlabored. Heart regular rate and  rhythm, sclera, dentition, and hearing are normal for the purpose of this exam.    Shoulder Exam:  Shoulder tender, guarding with overhead rom, n/v intact, distal pulse present.    Diagnostic Data: Imaging done  previously in the office was personally and individually reviewed and discussed at length with the patient to evaluate shoulder pain:    AP, Scapular Y and Axillary Lateral of bilateral shoulder show rotator cuff arthropathy,  stable from comparison views.      Procedure:  Large Joint Arthrocentesis  Date/Time: 6/27/2017 1:39 PM  Consent given by: patient  Site marked: site marked  Timeout: Immediately prior to procedure a time out was called to verify the correct patient, procedure, equipment, support staff and site/side marked as required   Supporting Documentation  Indications: pain and joint swelling   Procedure Details  Location: shoulder - L glenohumeral  Preparation: Patient was prepped and draped in the usual sterile fashion  Needle size: 22 G  Approach: anterolateral  Medications administered: 2 mL lidocaine 1 %; 80 mg methylPREDNISolone acetate 80 MG/ML; 2 mL bupivacaine  Patient tolerance: patient tolerated the procedure well with no immediate complications    Large Joint Arthrocentesis  Date/Time: 6/27/2017 1:39 PM  Consent given by: patient  Site marked: site marked  Timeout: Immediately prior to procedure a time out was called to verify the correct patient, procedure, equipment, support staff and site/side marked as required   Supporting Documentation  Indications: pain and joint swelling   Procedure Details  Location: shoulder - R glenohumeral  Preparation: Patient was prepped and draped in the usual sterile  fashion  Needle size: 22 G  Approach: anterolateral  Medications administered: 2 mL lidocaine 1 %; 80 mg methylPREDNISolone acetate 80 MG/ML; 2 mL bupivacaine  Patient tolerance: patient tolerated the procedure well with no immediate complications                Assessment. bilateral rotator cuff arthropathy    Plan: Is to proceed with injection  Follow up as indicated.  Ice, elevate, and rest as needed.  Additional interventions include: Biomechanics of pertinent body area discussed.  Risks, benefits, alternatives, comparisons, and complications of accepted medicines, injections, recommendations, surgical procedures, and therapies explained and education provided in laymen's terms. The patient was given the opportunity to ask questions and they were answerved to their satisfaction.   Natural history and expected course discussed. Questions answered.  Advice on benefits of, and types of regular/moderate exercise.  Lifestyle measures for weight loss with biomechanical explanations for weight loss and how this affects orthopedic condition.  Cortisone Injection. See procedure note.  OTC analgesics as needed with dosage warning and instructions given.  Cryotherapy/brachy therapy as indicated with instructions.   PT referral offered and declined, advised on stretching/strengthening exercises.  15 min spent face to face with patient >10 min spent counseling about natural history and expected course of assessed complaint. Questions answered.  Was doing PT but flared up shoulders really bad, discussed restarting at a really low intensity as tolerated.   Demonstrated shoulder exercises. Verb understanding.   6/27/2017   MJR

## 2017-07-13 ENCOUNTER — TELEPHONE (OUTPATIENT)
Dept: ONCOLOGY | Facility: CLINIC | Age: 82
End: 2017-07-13

## 2017-07-13 NOTE — TELEPHONE ENCOUNTER
Patient asking if she needs to continue coming in for yearly visits.  Message sent to Dr. Larkin.  Will respond to patient when she responds.

## 2017-07-17 ENCOUNTER — TELEPHONE (OUTPATIENT)
Dept: ONCOLOGY | Facility: CLINIC | Age: 82
End: 2017-07-17

## 2017-07-17 ENCOUNTER — HOSPITAL ENCOUNTER (OUTPATIENT)
Dept: CARDIOLOGY | Facility: HOSPITAL | Age: 82
Setting detail: RECURRING SERIES
Discharge: HOME | End: 2017-07-17

## 2017-07-17 PROCEDURE — 85610 PROTHROMBIN TIME: CPT

## 2017-07-17 PROCEDURE — 36416 COLLJ CAPILLARY BLOOD SPEC: CPT

## 2017-07-17 RX ORDER — WARFARIN SODIUM 4 MG/1
TABLET ORAL
Qty: 90 TABLET | Refills: 0 | Status: SHIPPED | OUTPATIENT
Start: 2017-07-17 | End: 2017-10-18 | Stop reason: SDUPTHER

## 2017-07-17 NOTE — TELEPHONE ENCOUNTER
----- Message from Lucien Larkin MD sent at 7/16/2017 11:02 PM EDT -----  F/u with primary MD -they will send her back to me f needed  gj   ----- Message -----     From: Ankita Rodriguez RN     Sent: 7/13/2017  10:47 AM       To: MD Dr. Trae Carver, patient wants to know if she needs to be seen this year.  She says she has been doing well and is following up with her PCP.  Is it ok to stop coming for now or do you want to see her?      Thanks.

## 2017-07-25 ENCOUNTER — CLINICAL SUPPORT NO REQUIREMENTS (OUTPATIENT)
Dept: CARDIOLOGY | Facility: CLINIC | Age: 82
End: 2017-07-25

## 2017-07-27 ENCOUNTER — TRANSCRIBE ORDERS (OUTPATIENT)
Dept: ADMINISTRATIVE | Facility: HOSPITAL | Age: 82
End: 2017-07-27

## 2017-07-27 ENCOUNTER — HOSPITAL ENCOUNTER (OUTPATIENT)
Dept: CARDIOLOGY | Facility: HOSPITAL | Age: 82
Discharge: HOME | End: 2017-07-27
Admitting: NURSE PRACTITIONER

## 2017-07-27 DIAGNOSIS — M79.89 LEG SWELLING: Primary | ICD-10-CM

## 2017-07-27 DIAGNOSIS — M79.89 LEG SWELLING: ICD-10-CM

## 2017-07-27 LAB

## 2017-07-27 PROCEDURE — 93970 EXTREMITY STUDY: CPT

## 2017-08-14 ENCOUNTER — HOSPITAL ENCOUNTER (OUTPATIENT)
Dept: CARDIOLOGY | Facility: HOSPITAL | Age: 82
Setting detail: RECURRING SERIES
Discharge: HOME | End: 2017-08-14

## 2017-08-14 PROCEDURE — 85610 PROTHROMBIN TIME: CPT

## 2017-08-14 PROCEDURE — 36416 COLLJ CAPILLARY BLOOD SPEC: CPT

## 2017-09-11 ENCOUNTER — HOSPITAL ENCOUNTER (OUTPATIENT)
Dept: CARDIOLOGY | Facility: HOSPITAL | Age: 82
Setting detail: RECURRING SERIES
Discharge: HOME | End: 2017-09-11

## 2017-09-11 PROCEDURE — 36416 COLLJ CAPILLARY BLOOD SPEC: CPT

## 2017-09-11 PROCEDURE — 85610 PROTHROMBIN TIME: CPT

## 2017-10-09 ENCOUNTER — HOSPITAL ENCOUNTER (OUTPATIENT)
Dept: CARDIOLOGY | Facility: HOSPITAL | Age: 82
Setting detail: RECURRING SERIES
Discharge: HOME | End: 2017-10-09

## 2017-10-09 PROCEDURE — 36416 COLLJ CAPILLARY BLOOD SPEC: CPT

## 2017-10-09 PROCEDURE — 85610 PROTHROMBIN TIME: CPT

## 2017-10-18 ENCOUNTER — OFFICE VISIT (OUTPATIENT)
Dept: CARDIOLOGY | Facility: CLINIC | Age: 82
End: 2017-10-18

## 2017-10-18 ENCOUNTER — CLINICAL SUPPORT NO REQUIREMENTS (OUTPATIENT)
Dept: CARDIOLOGY | Facility: CLINIC | Age: 82
End: 2017-10-18

## 2017-10-18 VITALS
BODY MASS INDEX: 38.41 KG/M2 | SYSTOLIC BLOOD PRESSURE: 126 MMHG | DIASTOLIC BLOOD PRESSURE: 82 MMHG | HEART RATE: 90 BPM | WEIGHT: 225 LBS | HEIGHT: 64 IN

## 2017-10-18 DIAGNOSIS — I25.119 CORONARY ARTERY DISEASE INVOLVING NATIVE CORONARY ARTERY OF NATIVE HEART WITH ANGINA PECTORIS (HCC): Primary | ICD-10-CM

## 2017-10-18 DIAGNOSIS — I48.0 PAROXYSMAL ATRIAL FIBRILLATION (HCC): ICD-10-CM

## 2017-10-18 DIAGNOSIS — E11.59 TYPE 2 DIABETES MELLITUS WITH OTHER CIRCULATORY COMPLICATION, WITHOUT LONG-TERM CURRENT USE OF INSULIN (HCC): ICD-10-CM

## 2017-10-18 DIAGNOSIS — I49.5 SICK SINUS SYNDROME (HCC): ICD-10-CM

## 2017-10-18 DIAGNOSIS — I49.5 SINOATRIAL NODE DYSFUNCTION (HCC): Primary | ICD-10-CM

## 2017-10-18 DIAGNOSIS — Z95.0 PACEMAKER: ICD-10-CM

## 2017-10-18 PROCEDURE — 93280 PM DEVICE PROGR EVAL DUAL: CPT | Performed by: INTERNAL MEDICINE

## 2017-10-18 PROCEDURE — 99214 OFFICE O/P EST MOD 30 MIN: CPT | Performed by: INTERNAL MEDICINE

## 2017-10-18 RX ORDER — WARFARIN SODIUM 4 MG/1
TABLET ORAL
Qty: 90 TABLET | Refills: 0 | Status: ON HOLD | OUTPATIENT
Start: 2017-10-18 | End: 2017-12-07 | Stop reason: SDUPTHER

## 2017-10-18 RX ORDER — RANITIDINE 300 MG/1
300 TABLET ORAL NIGHTLY
COMMUNITY
End: 2017-12-10 | Stop reason: HOSPADM

## 2017-10-18 NOTE — PROGRESS NOTES
Date of Office Visit: 10/18/17  Encounter Provider: Rommel Veliz MD  Place of Service: [facility]  Patient Name: Caitlyn Longoria  :1934      Chief Complaint   Patient presents with   • Atrial Fibrillation   • Coronary Artery Disease     History of Present Illness  HPI Comments: The patient is a pleasant 82-year-old white female with a history of diabetes and  hypertension who presented in 2008 with chest discomfort. In 2007, it  looked like she had had an anterior infarct. Echocardiogram then confirmed that. She then  had cardiac catheterization on 2008, which confirmed a left ventricular  ejection fraction of 35%, total occlusion of the mid left anterior descending, severe  disease of the large diagonal, and insignificant disease of the right coronary artery and  circumflex. She was admitted and had outpatient angioplasty and drug-eluting stent  placement of the diagonal. Unfortunately, she developed a right femoral pseudoaneurysm and  had to have that surgically repaired. Follow-up echocardiogram in  showed her left  ventricular function had returned to normal. She then presented on 2011, with  increasing shortness of breath and was found to have some mild congestive heart failure,  atrial flutter, and marked bradycardia. She had a repeat echocardiogram again that  confirmed normal left ventricular systolic function. She had a dual-chamber pacemaker  implanted. She continued to have episodes of atrial flutter with symptomatic dyspnea. She  underwent atrial flutter ablation in 2011.  She then presented in 2012 with dyspnea, thought that this might be heart  failure, anginal equivalence. She underwent cardiac catheterization which showed elevated  right-sided pressure with a PA systolic pressure of 53 mmHg, mean of 31 mmHg, but elevated  wedge pressure at 26. She was also found to have in-stent  stenosis of the diagonal vessel.  Other vessels were free of disease with the exception of total occlusion of the mid left  anterior descending which was unchanged. A left ventricular ejection fraction of  approximately 50%. She underwent angioplasty and drug-eluting stent placement of the  diagonal vessel. Prinivil was increased. We increased her Lasix to 30 mg a day however,  she developed dizziness on that and we had to cut her back to 20 mg of Lasix a day.  She unfortunately has had a couple of episodes of passing out. It turns out that was from low   blood pressure, and they resolved with adjusting her medications.  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 She ended up being admitted and had the device removed and cleaned  out. During that hospital stay she was found to have a nonfunctioning RV lead. She was  stable and in view of the infection, it was decided not to go and replace the lead at that  time.  She then in 10/2014 had her RV lead revised.   Then in November 2016 was having some increased shortness of breath had a perfusion stress test that showed a very small area of ischemia in the septal wall her echocardiogram showed a left ventricular ejection fraction of approximately 45% which was about her baseline no real significant valvular disease.  She isn't subsequently diagnosed as having significant gastroesophageal reflux with aspiration and been treated for that.  She comes in for follow-up she does get short of breath walking 5-10 minutes but just stops and then rests and takes back up again.  The medication she's been on for the reflux seems to help some.  But she still gets short of breath and coughs a fair amount.  She denies orthopnea or PND.  Has had some bruising especially in her lower extremities and his had that evaluated.  Her INRs of been therapeutic.  She denies any blood in her stool or black tarry stools as well as the  stroke type symptoms such as dysarthria paralysis or paresthesias.  Overall she feels like she's doing reasonably well specialty from a heart standpoint.    Atrial Fibrillation   Symptoms are negative for dizziness and weakness. Past medical history includes atrial fibrillation and CAD.   Coronary Artery Disease   Pertinent negatives include no dizziness, muscle weakness or weight gain. Her past medical history is significant for past myocardial infarction.         Past Medical History:   Diagnosis Date   • Abdominal pain    • Cough    • Diarrhea    • Difficulty breathing    • Dizziness    • DM type 2 (diabetes mellitus, type 2)          Past Surgical History:   Procedure Laterality Date   • CARDIAC CATHETERIZATION     • CHOLECYSTECTOMY     • CORONARY STENT PLACEMENT     • HERNIA REPAIR     • HYSTERECTOMY     • PACEMAKER IMPLANTATION     • REPLACEMENT TOTAL KNEE           Current Outpatient Prescriptions on File Prior to Visit   Medication Sig Dispense Refill   • acetaminophen (TYLENOL) 650 MG 8 hr tablet Take 650 mg by mouth. PRN     • Acetylcysteine (NAC PO) Take  by mouth.     • azelastine (ASTEPRO) 0.15 % solution nasal spray 2 sprays into each nostril 2 (two) times a day.     • azelastine (OPTIVAR) 0.05 % ophthalmic solution Apply  to eye 2 (two) times a day.     • BIOTIN MAXIMUM STRENGTH PO Take  by mouth Daily.     • bumetanide (BUMEX) 1 MG tablet Take  by mouth daily.     • cetirizine (ZyrTEC) 10 MG tablet Take  by mouth daily.     • clotrimazole-betamethasone (LOTRISONE) 1-0.05 % cream Apply  topically Daily.     • Cyanocobalamin (B-12) 1000 MCG/ML kit Inject  as directed 1 (one) time per week.     • flunisolide (NASALIDE) 25 MCG/ACT (0.025%) solution nasal spray Inhale 2 sprays every 12 (twelve) hours.     • glipiZIDE (GLUCOTROL) 5 MG tablet Take 5 mg by mouth daily.     • Loperamide HCl (IMODIUM PO) Take  by mouth As Needed.     • melatonin tablet Take 1 mg by mouth Every Night.     • Omega-3 Fatty Acids  (FISH OIL) 1000 MG capsule capsule Take  by mouth Daily With Breakfast.     • oxazepam (SERAX) 10 MG capsule Take  by mouth Daily.     • oxybutynin XL (DITROPAN-XL) 10 MG 24 hr tablet Take 10 mg by mouth Daily.     • pantoprazole (PROTONIX) 40 MG EC tablet Take  by mouth 2 (Two) Times a Day.     • potassium chloride (K-DUR,KLOR-CON) 20 MEQ CR tablet Take 20 mEq by mouth Daily.     • Probiotic Product (PROBIOTIC PO) Take  by mouth Daily.     • rosuvastatin (CRESTOR) 10 MG tablet Take 10 mg by mouth Daily.     • [DISCONTINUED] cefdinir (OMNICEF) 300 MG capsule Take 300 mg by mouth 2 (Two) Times a Day.     • [DISCONTINUED] cholecalciferol (VITAMIN D3) 1000 UNITS tablet Take 1,000 Units by mouth Daily.     • [DISCONTINUED] warfarin (COUMADIN) 4 MG tablet Take one tablet by mouth daily 90 tablet 0     No current facility-administered medications on file prior to visit.          Social History     Social History   • Marital status: Single     Spouse name: N/A   • Number of children: N/A   • Years of education: N/A     Occupational History   • Not on file.     Social History Main Topics   • Smoking status: Never Smoker   • Smokeless tobacco: Never Used   • Alcohol use No   • Drug use: No   • Sexual activity: Not on file     Other Topics Concern   • Not on file     Social History Narrative       History reviewed. No pertinent family history.      Review of Systems   Constitution: Negative for decreased appetite, diaphoresis, fever, weakness, malaise/fatigue, weight gain and weight loss.   HENT: Negative for congestion, hearing loss, nosebleeds and tinnitus.    Eyes: Negative for blurred vision, double vision, vision loss in left eye, vision loss in right eye and visual disturbance.   Cardiovascular:        As noted in HPI   Respiratory:        As noted HPI   Endocrine: Negative for cold intolerance and heat intolerance.   Hematologic/Lymphatic: Negative for bleeding problem. Does not bruise/bleed easily.   Skin: Positive  "for color change. Negative for flushing, itching and rash.   Musculoskeletal: Positive for joint pain. Negative for arthritis, back pain, joint swelling, muscle weakness and myalgias.   Gastrointestinal: Negative for bloating, abdominal pain, constipation, diarrhea, dysphagia, heartburn, hematemesis, hematochezia, melena, nausea and vomiting.   Genitourinary: Negative for bladder incontinence, dysuria, frequency, nocturia and urgency.   Neurological: Negative for dizziness, focal weakness, headaches, light-headedness, loss of balance, numbness, paresthesias and vertigo.   Psychiatric/Behavioral: Negative for depression, memory loss and substance abuse.       Procedures      ECG 12 Lead  Date/Time: 10/18/2017 10:23 AM  Performed by: DIVYA NELSON  Authorized by: DIVYA NELSON   Rhythm comments: AV paced rhythm                 Objective:    /82 (BP Location: Right arm)  Pulse 90  Ht 64\" (162.6 cm)  Wt 225 lb (102 kg)  BMI 38.62 kg/m2       Physical Exam  Physical Exam   Constitutional: She is oriented to person, place, and time. She appears well-developed and well-nourished. No distress.   HENT:   Head: Normocephalic.   Eyes: Conjunctivae are normal. Pupils are equal, round, and reactive to light. No scleral icterus.   Neck: Normal carotid pulses, no hepatojugular reflux and no JVD present. Carotid bruit is not present. No tracheal deviation, no edema and no erythema present. No thyromegaly present.   Cardiovascular: Normal rate, regular rhythm, S1 normal, S2 normal and intact distal pulses.   No extrasystoles are present. PMI is not displaced.  Exam reveals no gallop, no distant heart sounds and no friction rub.    Murmur heard.   Systolic murmur is present with a grade of 2/6  at the lower left sternal border  Pulses:       Carotid pulses are 2+ on the right side, and 2+ on the left side.       Radial pulses are 2+ on the right side, and 2+ on the left side.        Femoral pulses are 2+ on the " right side, and 2+ on the left side.       Dorsalis pedis pulses are 2+ on the right side, and 2+ on the left side.        Posterior tibial pulses are 2+ on the right side, and 2+ on the left side.   Pulmonary/Chest: Effort normal. No respiratory distress. She has decreased breath sounds in the left lower field. She has no wheezes. She has no rhonchi. She has no rales. She exhibits no tenderness.   Abdominal: Soft. Bowel sounds are normal. She exhibits no distension and no mass. There is no hepatosplenomegaly. There is no tenderness. There is no rebound and no guarding.   Musculoskeletal: She exhibits no edema, tenderness or deformity.   Neurological: She is alert and oriented to person, place, and time.   Skin: Skin is warm and dry. No rash noted. She is not diaphoretic. No cyanosis or erythema. No pallor. Nails show no clubbing.   Bruising   Psychiatric: She has a normal mood and affect. Her speech is normal and behavior is normal. Judgment and thought content normal.           Assessment:   1. This is a 83-year-old female with history of coronary disease over the mid left anterior descending with total occlusion of that. First diagonal vessel with severe  disease status post angioplasty and drug-eluting stent placement of that. Followup catheterization in 10/12 showed restenosis of the diagonal vessel with repeat angioplasty  and drug-eluting stent placement of that. Left ventricular ejection fraction of approximately 50% with segmental wall motion abnormality but at the time of that catheterization did  have elevated pulmonary artery pressure as well as pulmonary capillary wedge pressure. Echocardiogram 8/15 LVEF 49% which is unchanged.  Coronary Artery Disease  Assessment  • The patient has no angina  • On warfarin    Plan  • Lifestyle modifications discussed include adhering to a heart healthy diet, avoidance of tobacco products, maintenance of a healthy weight, medication compliance, regular exercise and  regular monitoring of cholesterol and blood pressure    Subjective - Objective  • There is a history of past MI  • There has been a previous stent procedure using LOLA          2. Paroxysmal atrial fibrillation/sick sinus syndrome status post permanent pacemaker implantation status post flutter ablation. S/P RV lead revision.   Atrial Fibrillation and Atrial Flutter  Assessment  • The patient has paroxysmal atrial fibrillation  • This is non-valvular in etiology  • The patient's CHADS2-VASc score is 6  • A [id number] score of 2 or more is considered a high risk for a thromboembolic event  • Warfarin prescribed    Plan  • Attempt to maintain sinus rhythm  • Continue warfarin for antithrombotic therapy, bleeding issues discussed  She seems to be remaining in sinus rhythm we'll continue the same.      3. Diabetes mellitus, followed in your office.   4. Hypertensive. Blood pressure adequately controlled.   5. Hyperlipidemia. She is following with her PCP  6. History of sleep apnea, on CPAP.   7. History of pulmonary hypertension, follows with pulmonary.   8. Obesity as above.  9. Syncope. Secondary to hypotension. No recurrence.   10.  Marked dyspnea.  Most likely secondary to her paralyzed left hemidiaphragm reflux and aspiration as well as underlying lung and heart disease.  May be some improved we'll continue the same.         Plan:

## 2017-10-19 ENCOUNTER — TELEPHONE (OUTPATIENT)
Dept: CARDIOLOGY | Facility: CLINIC | Age: 82
End: 2017-10-19

## 2017-10-19 NOTE — TELEPHONE ENCOUNTER
Patient called to report she sees Dr. DON Ray, Nephrologist and he asked why she was still on Bumex 1 mg daily.  He stated it is a strong diuretic.  Patient asks if she should be changed to Lasix.  Please advise.     Patient's phone number is [phone]    Patient states it is OK to leave a message for her if she does not answer./ KOBI

## 2017-11-06 ENCOUNTER — HOSPITAL ENCOUNTER (OUTPATIENT)
Dept: CARDIOLOGY | Facility: HOSPITAL | Age: 82
Setting detail: RECURRING SERIES
Discharge: HOME | End: 2017-11-06

## 2017-11-06 PROCEDURE — 36416 COLLJ CAPILLARY BLOOD SPEC: CPT

## 2017-11-06 PROCEDURE — 85610 PROTHROMBIN TIME: CPT

## 2017-12-04 ENCOUNTER — OFFICE (OUTPATIENT)
Dept: URBAN - METROPOLITAN AREA OTHER 6 | Facility: OTHER | Age: 82
End: 2017-12-04
Payer: COMMERCIAL

## 2017-12-04 ENCOUNTER — HOSPITAL ENCOUNTER (OUTPATIENT)
Dept: CARDIOLOGY | Facility: HOSPITAL | Age: 82
Setting detail: RECURRING SERIES
Discharge: HOME | End: 2017-12-04

## 2017-12-04 VITALS
HEART RATE: 96 BPM | DIASTOLIC BLOOD PRESSURE: 90 MMHG | SYSTOLIC BLOOD PRESSURE: 144 MMHG | WEIGHT: 225 LBS | HEIGHT: 65 IN

## 2017-12-04 DIAGNOSIS — K57.30 DIVERTICULOSIS OF LARGE INTESTINE WITHOUT PERFORATION OR ABS: ICD-10-CM

## 2017-12-04 DIAGNOSIS — K21.9 GASTRO-ESOPHAGEAL REFLUX DISEASE WITHOUT ESOPHAGITIS: ICD-10-CM

## 2017-12-04 DIAGNOSIS — K31.84 GASTROPARESIS: ICD-10-CM

## 2017-12-04 DIAGNOSIS — R05 COUGH: ICD-10-CM

## 2017-12-04 DIAGNOSIS — Z86.010 PERSONAL HISTORY OF COLONIC POLYPS: ICD-10-CM

## 2017-12-04 PROCEDURE — 36416 COLLJ CAPILLARY BLOOD SPEC: CPT

## 2017-12-04 PROCEDURE — 99213 OFFICE O/P EST LOW 20 MIN: CPT | Performed by: INTERNAL MEDICINE

## 2017-12-04 PROCEDURE — 85610 PROTHROMBIN TIME: CPT

## 2017-12-04 RX ORDER — RANITIDINE 300 MG/1
300 TABLET ORAL
Qty: 90 | Refills: 4 | Status: ACTIVE
Start: 2017-10-05

## 2017-12-04 RX ORDER — BACLOFEN 10 MG/1
30 TABLET ORAL
Qty: 90 | Refills: 12 | Status: ACTIVE
Start: 2017-12-04

## 2017-12-04 NOTE — SERVICEHPINOTES
has been avoiding caffeine, and small meals during the day,  nocturnal cough as well.  using ranitidine helped with some of the nocturnal coughing.   went to chiropractor,  states one adjustment helps with cough for bit.  wants something done for her cough   using pantoprazole twice daily,  and ranitidine at bedtime.   concern over persistence of the cough.

## 2017-12-05 ENCOUNTER — CLINICAL SUPPORT (OUTPATIENT)
Dept: ORTHOPEDIC SURGERY | Facility: CLINIC | Age: 82
End: 2017-12-05

## 2017-12-05 ENCOUNTER — TELEPHONE (OUTPATIENT)
Dept: ORTHOPEDIC SURGERY | Facility: CLINIC | Age: 82
End: 2017-12-05

## 2017-12-05 VITALS — HEIGHT: 64 IN | TEMPERATURE: 97.2 F | WEIGHT: 225 LBS | BODY MASS INDEX: 38.41 KG/M2

## 2017-12-05 DIAGNOSIS — G89.29 CHRONIC LEFT SHOULDER PAIN: Primary | ICD-10-CM

## 2017-12-05 DIAGNOSIS — M25.512 CHRONIC LEFT SHOULDER PAIN: Primary | ICD-10-CM

## 2017-12-05 DIAGNOSIS — M19.012 ARTHROPATHY OF LEFT SHOULDER: ICD-10-CM

## 2017-12-05 DIAGNOSIS — B99.9 INFECTION: Primary | ICD-10-CM

## 2017-12-05 PROCEDURE — 99212 OFFICE O/P EST SF 10 MIN: CPT | Performed by: ORTHOPAEDIC SURGERY

## 2017-12-05 PROCEDURE — 73030 X-RAY EXAM OF SHOULDER: CPT | Performed by: ORTHOPAEDIC SURGERY

## 2017-12-05 PROCEDURE — 20610 DRAIN/INJ JOINT/BURSA W/O US: CPT | Performed by: ORTHOPAEDIC SURGERY

## 2017-12-05 RX ORDER — BACLOFEN 10 MG/1
10 TABLET ORAL 3 TIMES DAILY
COMMUNITY
Start: 2017-12-04 | End: 2017-12-10 | Stop reason: HOSPADM

## 2017-12-05 RX ORDER — LIDOCAINE HYDROCHLORIDE 10 MG/ML
2 INJECTION, SOLUTION EPIDURAL; INFILTRATION; INTRACAUDAL; PERINEURAL
Status: COMPLETED | OUTPATIENT
Start: 2017-12-05 | End: 2017-12-05

## 2017-12-05 RX ORDER — BUPIVACAINE HYDROCHLORIDE 5 MG/ML
2 INJECTION, SOLUTION EPIDURAL; INTRACAUDAL
Status: COMPLETED | OUTPATIENT
Start: 2017-12-05 | End: 2017-12-05

## 2017-12-05 RX ORDER — METHYLPREDNISOLONE ACETATE 80 MG/ML
80 INJECTION, SUSPENSION INTRA-ARTICULAR; INTRALESIONAL; INTRAMUSCULAR; SOFT TISSUE
Status: COMPLETED | OUTPATIENT
Start: 2017-12-05 | End: 2017-12-05

## 2017-12-05 RX ORDER — KETOCONAZOLE 20 MG/ML
SHAMPOO TOPICAL
Status: ON HOLD | COMMUNITY
Start: 2017-10-24 | End: 2017-12-07

## 2017-12-05 RX ADMIN — LIDOCAINE HYDROCHLORIDE 2 ML: 10 INJECTION, SOLUTION EPIDURAL; INFILTRATION; INTRACAUDAL; PERINEURAL at 14:39

## 2017-12-05 RX ADMIN — BUPIVACAINE HYDROCHLORIDE 2 ML: 5 INJECTION, SOLUTION EPIDURAL; INTRACAUDAL at 14:39

## 2017-12-05 RX ADMIN — METHYLPREDNISOLONE ACETATE 80 MG: 80 INJECTION, SUSPENSION INTRA-ARTICULAR; INTRALESIONAL; INTRAMUSCULAR; SOFT TISSUE at 14:39

## 2017-12-05 NOTE — TELEPHONE ENCOUNTER
Sorry about the confusion.  I have placed an order for CRP and sedimentation rate for the patient to have done mid January prior to her appointment.

## 2017-12-05 NOTE — PROGRESS NOTES
Patient: Caitlyn Longoria  YOB: 1934  Date of Service: 12/5/2017    Chief Complaints: Shoulder pain    SUBJECTIVE:    History of Present Illness: Pt is seen in the office today with complaint of Chief Complaint   Patient presents with   • Left Shoulder - Follow-up, Pain   . Lasted 6 months since last coritsone inj. but has had recent flare.   The pain is daily, generalized in the shoulder, and is acute on chronic in nature, and is worsened by overhead motion. Pt has had intermittent shoulder injections with good relief in the past that have helped to relieve pain, and swelling and increase rom.     Allergies:   Allergies   Allergen Reactions   • Ahist [Chlorpheniramine]    • Amoxicillin    • Clarithromycin      Other reaction(s): Headache   • Diclofenac      Voltaren   • Diphenhydramine      Benadryl   • Esomeprazole      Nexium   • Ibuprofen Other (See Comments)     Does not recall    • Levalbuterol      Xopenex   • Levocetirizine      Xyzal   • Lipitor [Atorvastatin]    • Lodine [Etodolac]    • Omeprazole      Prilosec   • Pravachol [Pravastatin]    • Quinapril Swelling and Confusion     Accupril   • Sulfa Antibiotics    • Sulindac      Other reaction(s): Headache   • Valdecoxib Irritability   • Prednisone Rash       Medications:   Home Medications:  Current Outpatient Prescriptions on File Prior to Visit   Medication Sig   • acetaminophen (TYLENOL) 650 MG 8 hr tablet Take 650 mg by mouth. PRN   • Acetylcysteine (NAC PO) Take  by mouth.   • azelastine (ASTEPRO) 0.15 % solution nasal spray 2 sprays into each nostril 2 (two) times a day.   • azelastine (OPTIVAR) 0.05 % ophthalmic solution Apply  to eye 2 (two) times a day.   • BIOTIN MAXIMUM STRENGTH PO Take  by mouth Daily.   • bumetanide (BUMEX) 1 MG tablet Take  by mouth daily.   • Calcium Carb-Cholecalciferol 600-1000 MG-UNIT capsule Take  by mouth Daily.   • clotrimazole-betamethasone (LOTRISONE) 1-0.05 % cream Apply  topically Daily.   •  Cyanocobalamin (B-12) 1000 MCG/ML kit Inject  as directed 1 (one) time per week.   • flunisolide (NASALIDE) 25 MCG/ACT (0.025%) solution nasal spray Inhale 2 sprays every 12 (twelve) hours.   • glipiZIDE (GLUCOTROL) 5 MG tablet Take 5 mg by mouth daily.   • Loperamide HCl (IMODIUM PO) Take  by mouth As Needed.   • melatonin tablet Take 1 mg by mouth Every Night.   • Omega-3 Fatty Acids (FISH OIL) 1000 MG capsule capsule Take  by mouth Daily With Breakfast.   • oxazepam (SERAX) 10 MG capsule Take  by mouth Daily.   • oxybutynin XL (DITROPAN-XL) 10 MG 24 hr tablet Take 10 mg by mouth Daily.   • pantoprazole (PROTONIX) 40 MG EC tablet Take  by mouth 2 (Two) Times a Day.   • potassium chloride (K-DUR,KLOR-CON) 20 MEQ CR tablet Take 20 mEq by mouth Daily.   • Probiotic Product (PROBIOTIC PO) Take  by mouth Daily.   • raNITIdine (ZANTAC) 300 MG tablet Take 300 mg by mouth Every Night.   • rosuvastatin (CRESTOR) 10 MG tablet Take 10 mg by mouth Daily.   • warfarin (COUMADIN) 4 MG tablet Take one tablet by mouth daily   • cetirizine (ZyrTEC) 10 MG tablet Take  by mouth daily.     No current facility-administered medications on file prior to visit.      Current Medications:  Scheduled Meds:  Continuous Infusions:  No current facility-administered medications for this visit.   PRN Meds:.    I have reviewed the patient's medical history in detail and updated the computerized patient record.  Review and summarization of old records include:    Past Medical History:   Diagnosis Date   • Abdominal pain    • Cough    • Diarrhea    • Difficulty breathing    • Dizziness    • DM type 2 (diabetes mellitus, type 2)         Past Surgical History:   Procedure Laterality Date   • CARDIAC CATHETERIZATION     • CHOLECYSTECTOMY     • CORONARY STENT PLACEMENT     • HERNIA REPAIR     • HYSTERECTOMY     • PACEMAKER IMPLANTATION     • REPLACEMENT TOTAL KNEE          Social History     Occupational History   • Not on file.     Social History Main  Topics   • Smoking status: Never Smoker   • Smokeless tobacco: Never Used   • Alcohol use No   • Drug use: No   • Sexual activity: Not on file    Social History     Social History Narrative      History reviewed. No pertinent family history.    ROS: 14 point review of systems was performed and was negative except for documented findings in HPI and today's encounter.     Allergies:   Allergies   Allergen Reactions   • Ahist [Chlorpheniramine]    • Amoxicillin    • Clarithromycin      Other reaction(s): Headache   • Diclofenac      Voltaren   • Diphenhydramine      Benadryl   • Esomeprazole      Nexium   • Ibuprofen Other (See Comments)     Does not recall    • Levalbuterol      Xopenex   • Levocetirizine      Xyzal   • Lipitor [Atorvastatin]    • Lodine [Etodolac]    • Omeprazole      Prilosec   • Pravachol [Pravastatin]    • Quinapril Swelling and Confusion     Accupril   • Sulfa Antibiotics    • Sulindac      Other reaction(s): Headache   • Valdecoxib Irritability   • Prednisone Rash     Constitutional:  Denies fever, shaking or chills   Eyes:  Denies change in visual acuity   HENT:  Denies nasal congestion or sore throat   Respiratory:  Denies cough or shortness of breath   Cardiovascular:  Denies chest pain or severe LE edema   GI:  Denies abdominal pain, nausea, vomiting, bloody stools or diarrhea   Musculoskeletal:  Numbness, tingling, or loss of motor function only as noted above in history of present illness.  : Denies painful urination or hematuria  Integument:  Denies rash, lesion or ulceration   Neurologic:  Denies headache or focal weakness  Endocrine:  Denies lymphadenopathy  Psych:  Denies confusion or change in mental status   Hem:  Denies active bleeding    OBJECTIVE:    Physical Exam: 83 y.o. female  Body mass index is 38.62 kg/(m^2).  Vitals:    12/05/17 1434   Temp: 97.2 °F (36.2 °C)       General Appearance:    Alert, cooperative, in no acute distress                    Patient is alert and  oriented ×3, no acute distress, appears at above-listed height, weight, and age.  Affect is normal, respiratory rate is normal, and unlabored. Heart regular rate and  rhythm, sclera, dentition, and hearing are normal for the purpose of this exam.    Shoulder Exam:  Shoulder tender, guarding with overhead rom, n/v intact, distal pulse present.    Diagnostic Data: Imaging done  today, images were personally viewed was personally and individually reviewed and discussed at length with the patient to evaluate shoulder pain:    AP, Scapular Y and Axillary Lateral of left shoulder show rotator cuff arthropathy,  stable from comparison views.      Procedure:  Large Joint Arthrocentesis  Date/Time: 12/5/2017 2:39 PM  Consent given by: patient  Site marked: site marked  Timeout: Immediately prior to procedure a time out was called to verify the correct patient, procedure, equipment, support staff and site/side marked as required   Supporting Documentation  Indications: pain and joint swelling   Procedure Details  Location: shoulder - L glenohumeral  Preparation: Patient was prepped and draped in the usual sterile fashion  Needle size: 22 G  Approach: anterolateral  Medications administered: 80 mg methylPREDNISolone acetate 80 MG/ML; 2 mL bupivacaine (PF) 0.5 %; 2 mL lidocaine PF 1% 1 %  Patient tolerance: patient tolerated the procedure well with no immediate complications                Assessment. left rotator cuff arthropathy    Plan: Is to proceed with injection  Follow up as indicated.  Ice, elevate, and rest as needed.  Additional interventions include:  15 min spent face to face with patient 10 min spent counseling about natural history and expected course of assessed complaint and reviewed treatment options that have been tried and not tried and those currently available. Questions answered.    Natural history and expected course of this patient's diagnosis discussed along with evaluation of therapies. Questions  answered.  Cortisone Injection. See procedure note.  OTC analgesics as needed with dosage warning and instructions given.  Cryotherapy/brachy therapy as indicated with instructions.   Is doing shoulder exercises.   12/5/2017   OWEN

## 2017-12-07 ENCOUNTER — APPOINTMENT (OUTPATIENT)
Dept: CT IMAGING | Facility: HOSPITAL | Age: 82
End: 2017-12-07

## 2017-12-07 ENCOUNTER — APPOINTMENT (OUTPATIENT)
Dept: GENERAL RADIOLOGY | Facility: HOSPITAL | Age: 82
End: 2017-12-07

## 2017-12-07 ENCOUNTER — HOSPITAL ENCOUNTER (INPATIENT)
Facility: HOSPITAL | Age: 82
LOS: 3 days | Discharge: HOME | End: 2017-12-10
Attending: HOSPITALIST | Admitting: HOSPITALIST

## 2017-12-07 DIAGNOSIS — R47.1 DYSARTHRIA: Primary | ICD-10-CM

## 2017-12-07 PROBLEM — E78.5 HLD (HYPERLIPIDEMIA): Status: ACTIVE | Noted: 2017-12-07

## 2017-12-07 PROBLEM — I48.91 ATRIAL FIBRILLATION (HCC): Status: ACTIVE | Noted: 2017-12-07

## 2017-12-07 PROBLEM — I25.10 CAD (CORONARY ARTERY DISEASE): Status: ACTIVE | Noted: 2017-12-07

## 2017-12-07 PROBLEM — E11.9 DM TYPE 2 (DIABETES MELLITUS, TYPE 2): Status: ACTIVE | Noted: 2017-12-07

## 2017-12-07 PROBLEM — J40 BRONCHITIS: Status: ACTIVE | Noted: 2017-12-07

## 2017-12-07 LAB
ABO GROUP BLD: NORMAL
ALBUMIN SERPL-MCNC: 4.4 G/DL (ref 3.5–5.2)
ALBUMIN/GLOB SERPL: 1.4 G/DL
ALP SERPL-CCNC: 115 U/L (ref 39–117)
ALT SERPL W P-5'-P-CCNC: 21 U/L (ref 1–33)
ANION GAP SERPL CALCULATED.3IONS-SCNC: 13.3 MMOL/L
AST SERPL-CCNC: 29 U/L (ref 1–32)
BASOPHILS # BLD AUTO: 0.02 10*3/MM3 (ref 0–0.2)
BASOPHILS NFR BLD AUTO: 0.2 % (ref 0–1.5)
BILIRUB SERPL-MCNC: 0.6 MG/DL (ref 0.1–1.2)
BLD GP AB SCN SERPL QL: NEGATIVE
BUN BLD-MCNC: 22 MG/DL (ref 8–23)
BUN/CREAT SERPL: 20.6 (ref 7–25)
CALCIUM SPEC-SCNC: 9.4 MG/DL (ref 8.6–10.5)
CHLORIDE SERPL-SCNC: 104 MMOL/L (ref 98–107)
CO2 SERPL-SCNC: 26.7 MMOL/L (ref 22–29)
CREAT BLD-MCNC: 1.07 MG/DL (ref 0.57–1)
DEPRECATED RDW RBC AUTO: 51.8 FL (ref 37–54)
EOSINOPHIL # BLD AUTO: 0.08 10*3/MM3 (ref 0–0.7)
EOSINOPHIL NFR BLD AUTO: 0.7 % (ref 0.3–6.2)
ERYTHROCYTE [DISTWIDTH] IN BLOOD BY AUTOMATED COUNT: 14.1 % (ref 11.7–13)
GFR SERPL CREATININE-BSD FRML MDRD: 49 ML/MIN/1.73
GLOBULIN UR ELPH-MCNC: 3.1 GM/DL
GLUCOSE BLD-MCNC: 172 MG/DL (ref 65–99)
GLUCOSE BLDC GLUCOMTR-MCNC: 142 MG/DL (ref 70–130)
GLUCOSE BLDC GLUCOMTR-MCNC: 161 MG/DL (ref 70–130)
HCT VFR BLD AUTO: 43.1 % (ref 35.6–45.5)
HGB BLD-MCNC: 13.9 G/DL (ref 11.9–15.5)
HOLD SPECIMEN: NORMAL
HOLD SPECIMEN: NORMAL
IMM GRANULOCYTES # BLD: 0.04 10*3/MM3 (ref 0–0.03)
IMM GRANULOCYTES NFR BLD: 0.4 % (ref 0–0.5)
INR PPP: 2.82 (ref 0.9–1.1)
LYMPHOCYTES # BLD AUTO: 4.91 10*3/MM3 (ref 0.9–4.8)
LYMPHOCYTES NFR BLD AUTO: 43 % (ref 19.6–45.3)
MCH RBC QN AUTO: 32.2 PG (ref 26.9–32)
MCHC RBC AUTO-ENTMCNC: 32.3 G/DL (ref 32.4–36.3)
MCV RBC AUTO: 99.8 FL (ref 80.5–98.2)
MONOCYTES # BLD AUTO: 0.33 10*3/MM3 (ref 0.2–1.2)
MONOCYTES NFR BLD AUTO: 2.9 % (ref 5–12)
NEUTROPHILS # BLD AUTO: 6.04 10*3/MM3 (ref 1.9–8.1)
NEUTROPHILS NFR BLD AUTO: 52.8 % (ref 42.7–76)
PLATELET # BLD AUTO: 195 10*3/MM3 (ref 140–500)
PMV BLD AUTO: 10.6 FL (ref 6–12)
POTASSIUM BLD-SCNC: 4.2 MMOL/L (ref 3.5–5.2)
PROT SERPL-MCNC: 7.5 G/DL (ref 6–8.5)
PROTHROMBIN TIME: 28.8 SECONDS (ref 11.7–14.2)
RBC # BLD AUTO: 4.32 10*6/MM3 (ref 3.9–5.2)
RH BLD: POSITIVE
SODIUM BLD-SCNC: 144 MMOL/L (ref 136–145)
WBC NRBC COR # BLD: 11.42 10*3/MM3 (ref 4.5–10.7)
WHOLE BLOOD HOLD SPECIMEN: NORMAL
WHOLE BLOOD HOLD SPECIMEN: NORMAL

## 2017-12-07 PROCEDURE — 82962 GLUCOSE BLOOD TEST: CPT

## 2017-12-07 PROCEDURE — 86900 BLOOD TYPING SEROLOGIC ABO: CPT | Performed by: HOSPITALIST

## 2017-12-07 PROCEDURE — 93005 ELECTROCARDIOGRAM TRACING: CPT

## 2017-12-07 PROCEDURE — 85610 PROTHROMBIN TIME: CPT | Performed by: HOSPITALIST

## 2017-12-07 PROCEDURE — 93010 ELECTROCARDIOGRAM REPORT: CPT | Performed by: INTERNAL MEDICINE

## 2017-12-07 PROCEDURE — 86850 RBC ANTIBODY SCREEN: CPT | Performed by: HOSPITALIST

## 2017-12-07 PROCEDURE — 70498 CT ANGIOGRAPHY NECK: CPT

## 2017-12-07 PROCEDURE — 36415 COLL VENOUS BLD VENIPUNCTURE: CPT | Performed by: HOSPITALIST

## 2017-12-07 PROCEDURE — 0042T HC CT CEREBRAL PERFUSION W/WO CONTRAST: CPT

## 2017-12-07 PROCEDURE — 80053 COMPREHEN METABOLIC PANEL: CPT | Performed by: HOSPITALIST

## 2017-12-07 PROCEDURE — 70450 CT HEAD/BRAIN W/O DYE: CPT

## 2017-12-07 PROCEDURE — 0 IOPAMIDOL PER 1 ML: Performed by: HOSPITALIST

## 2017-12-07 PROCEDURE — 86901 BLOOD TYPING SEROLOGIC RH(D): CPT | Performed by: HOSPITALIST

## 2017-12-07 PROCEDURE — 85025 COMPLETE CBC W/AUTO DIFF WBC: CPT | Performed by: HOSPITALIST

## 2017-12-07 PROCEDURE — 70496 CT ANGIOGRAPHY HEAD: CPT

## 2017-12-07 PROCEDURE — 71010 HC CHEST PA OR AP: CPT

## 2017-12-07 PROCEDURE — 93005 ELECTROCARDIOGRAM TRACING: CPT | Performed by: HOSPITALIST

## 2017-12-07 PROCEDURE — 99284 EMERGENCY DEPT VISIT MOD MDM: CPT

## 2017-12-07 RX ORDER — PANTOPRAZOLE SODIUM 40 MG/1
40 TABLET, DELAYED RELEASE ORAL 2 TIMES DAILY WITH MEALS
Status: DISCONTINUED | OUTPATIENT
Start: 2017-12-07 | End: 2017-12-10 | Stop reason: HOSPADM

## 2017-12-07 RX ORDER — GLIPIZIDE 5 MG/1
5 TABLET ORAL EVERY MORNING
Status: DISCONTINUED | OUTPATIENT
Start: 2017-12-08 | End: 2017-12-10 | Stop reason: HOSPADM

## 2017-12-07 RX ORDER — OCTISALATE, AVOBENZONE, HOMOSALATE, AND OCTOCRYLENE 29.4; 29.4; 49; 25.48 MG/ML; MG/ML; MG/ML; MG/ML
1 LOTION TOPICAL EVERY MORNING
COMMUNITY
End: 2017-12-10 | Stop reason: HOSPADM

## 2017-12-07 RX ORDER — MELATONIN 200 MCG
3 TABLET ORAL NIGHTLY
COMMUNITY
End: 2018-09-10

## 2017-12-07 RX ORDER — OXAZEPAM 10 MG
10 CAPSULE ORAL NIGHTLY
Status: DISCONTINUED | OUTPATIENT
Start: 2017-12-07 | End: 2017-12-10 | Stop reason: HOSPADM

## 2017-12-07 RX ORDER — ONDANSETRON 2 MG/ML
4 INJECTION INTRAMUSCULAR; INTRAVENOUS EVERY 6 HOURS PRN
Status: DISCONTINUED | OUTPATIENT
Start: 2017-12-07 | End: 2017-12-10 | Stop reason: HOSPADM

## 2017-12-07 RX ORDER — WARFARIN SODIUM 4 MG/1
4 TABLET ORAL DAILY
COMMUNITY
End: 2018-08-28 | Stop reason: SDUPTHER

## 2017-12-07 RX ORDER — ONDANSETRON 4 MG/1
4 TABLET, FILM COATED ORAL EVERY 6 HOURS PRN
Status: DISCONTINUED | OUTPATIENT
Start: 2017-12-07 | End: 2017-12-10 | Stop reason: HOSPADM

## 2017-12-07 RX ORDER — ACETAMINOPHEN 650 MG/1
650 SUPPOSITORY RECTAL EVERY 4 HOURS PRN
Status: DISCONTINUED | OUTPATIENT
Start: 2017-12-07 | End: 2017-12-10 | Stop reason: HOSPADM

## 2017-12-07 RX ORDER — CIPROFLOXACIN 500 MG/1
500 TABLET, FILM COATED ORAL 2 TIMES DAILY
COMMUNITY
Start: 2017-11-30 | End: 2017-12-10 | Stop reason: HOSPADM

## 2017-12-07 RX ORDER — POTASSIUM CHLORIDE 750 MG/1
20 CAPSULE, EXTENDED RELEASE ORAL DAILY
Status: DISCONTINUED | OUTPATIENT
Start: 2017-12-07 | End: 2017-12-10 | Stop reason: HOSPADM

## 2017-12-07 RX ORDER — DEXTROSE MONOHYDRATE 25 G/50ML
25 INJECTION, SOLUTION INTRAVENOUS
Status: DISCONTINUED | OUTPATIENT
Start: 2017-12-07 | End: 2017-12-10 | Stop reason: HOSPADM

## 2017-12-07 RX ORDER — SODIUM CHLORIDE 0.9 % (FLUSH) 0.9 %
1-10 SYRINGE (ML) INJECTION AS NEEDED
Status: DISCONTINUED | OUTPATIENT
Start: 2017-12-07 | End: 2017-12-10 | Stop reason: HOSPADM

## 2017-12-07 RX ORDER — FAMOTIDINE 20 MG/1
20 TABLET, FILM COATED ORAL 2 TIMES DAILY
Status: DISCONTINUED | OUTPATIENT
Start: 2017-12-07 | End: 2017-12-10 | Stop reason: HOSPADM

## 2017-12-07 RX ORDER — ASPIRIN 300 MG/1
300 SUPPOSITORY RECTAL DAILY
Status: DISCONTINUED | OUTPATIENT
Start: 2017-12-07 | End: 2017-12-09

## 2017-12-07 RX ORDER — ONDANSETRON 4 MG/1
4 TABLET, ORALLY DISINTEGRATING ORAL EVERY 6 HOURS PRN
Status: DISCONTINUED | OUTPATIENT
Start: 2017-12-07 | End: 2017-12-10 | Stop reason: HOSPADM

## 2017-12-07 RX ORDER — NICOTINE POLACRILEX 4 MG
15 LOZENGE BUCCAL
Status: DISCONTINUED | OUTPATIENT
Start: 2017-12-07 | End: 2017-12-10 | Stop reason: HOSPADM

## 2017-12-07 RX ORDER — BUMETANIDE 1 MG/1
1 TABLET ORAL EVERY MORNING
Status: DISCONTINUED | OUTPATIENT
Start: 2017-12-08 | End: 2017-12-10 | Stop reason: HOSPADM

## 2017-12-07 RX ORDER — ASPIRIN 325 MG
325 TABLET ORAL DAILY
Status: DISCONTINUED | OUTPATIENT
Start: 2017-12-07 | End: 2017-12-08

## 2017-12-07 RX ORDER — FENOFIBRATE 145 MG/1
145 TABLET, COATED ORAL DAILY
COMMUNITY
End: 2018-01-14

## 2017-12-07 RX ORDER — SODIUM CHLORIDE 9 MG/ML
75 INJECTION, SOLUTION INTRAVENOUS CONTINUOUS
Status: DISCONTINUED | OUTPATIENT
Start: 2017-12-07 | End: 2017-12-10 | Stop reason: HOSPADM

## 2017-12-07 RX ORDER — WARFARIN SODIUM 4 MG/1
4 TABLET ORAL DAILY
Status: DISCONTINUED | OUTPATIENT
Start: 2017-12-07 | End: 2017-12-10 | Stop reason: HOSPADM

## 2017-12-07 RX ORDER — CETIRIZINE HYDROCHLORIDE 10 MG/1
10 TABLET ORAL DAILY
Status: DISCONTINUED | OUTPATIENT
Start: 2017-12-07 | End: 2017-12-10 | Stop reason: HOSPADM

## 2017-12-07 RX ORDER — AZELASTINE HCL 205.5 UG/1
2 SPRAY NASAL 2 TIMES DAILY
COMMUNITY
End: 2019-04-17 | Stop reason: ALTCHOICE

## 2017-12-07 RX ORDER — ATORVASTATIN CALCIUM 80 MG/1
80 TABLET, FILM COATED ORAL NIGHTLY
Status: DISCONTINUED | OUTPATIENT
Start: 2017-12-07 | End: 2017-12-08

## 2017-12-07 RX ORDER — FLUNISOLIDE 0.25 MG/ML
2 SOLUTION NASAL EVERY 12 HOURS
COMMUNITY
End: 2019-04-17 | Stop reason: ALTCHOICE

## 2017-12-07 RX ORDER — ACETAMINOPHEN 325 MG/1
650 TABLET ORAL EVERY 4 HOURS PRN
Status: DISCONTINUED | OUTPATIENT
Start: 2017-12-07 | End: 2017-12-10 | Stop reason: HOSPADM

## 2017-12-07 RX ORDER — MELATONIN
1000
Status: DISCONTINUED | OUTPATIENT
Start: 2017-12-07 | End: 2017-12-10 | Stop reason: HOSPADM

## 2017-12-07 RX ORDER — SODIUM CHLORIDE 0.9 % (FLUSH) 0.9 %
10 SYRINGE (ML) INJECTION AS NEEDED
Status: DISCONTINUED | OUTPATIENT
Start: 2017-12-07 | End: 2017-12-10 | Stop reason: HOSPADM

## 2017-12-07 RX ORDER — MELATONIN
1000
COMMUNITY
End: 2018-01-14

## 2017-12-07 RX ADMIN — IOPAMIDOL 50 ML: 755 INJECTION, SOLUTION INTRAVENOUS at 21:00

## 2017-12-07 RX ADMIN — PANTOPRAZOLE SODIUM 40 MG: 40 TABLET, DELAYED RELEASE ORAL at 22:40

## 2017-12-07 RX ADMIN — OXAZEPAM 10 MG: 10 CAPSULE, GELATIN COATED ORAL at 22:40

## 2017-12-07 RX ADMIN — IOPAMIDOL 95 ML: 755 INJECTION, SOLUTION INTRAVENOUS at 20:22

## 2017-12-07 RX ADMIN — POTASSIUM CHLORIDE 20 MEQ: 750 CAPSULE, EXTENDED RELEASE ORAL at 23:14

## 2017-12-07 RX ADMIN — FAMOTIDINE 20 MG: 20 TABLET, FILM COATED ORAL at 22:25

## 2017-12-07 RX ADMIN — CETIRIZINE HYDROCHLORIDE 10 MG: 10 TABLET, FILM COATED ORAL at 22:25

## 2017-12-07 RX ADMIN — SODIUM CHLORIDE 75 ML/HR: 9 INJECTION, SOLUTION INTRAVENOUS at 22:40

## 2017-12-07 RX ADMIN — ATORVASTATIN CALCIUM 80 MG: 80 TABLET, FILM COATED ORAL at 22:25

## 2017-12-07 RX ADMIN — WARFARIN SODIUM 4 MG: 4 TABLET ORAL at 22:35

## 2017-12-07 RX ADMIN — VITAMIN D, TAB 1000IU (100/BT) 1000 UNITS: 25 TAB at 22:25

## 2017-12-07 NOTE — ED PROVIDER NOTES
EMERGENCY DEPARTMENT ENCOUNTER    Room Number:  13/13  Date seen:  12/7/2017  Time seen: 2:06 PM  PCP: Zenaida Suarez MD  Cardiologist- Dr. Veliz    HPI:  Chief complaint: trouble speaking  Context:Caitlyn Longoria is a 83 y.o. female who presents to the ED with c/o difficulty speaking since 1030AM yesterday. Pt describes her difficulty speaking as trouble articulating the exact word that she wanted to say even though she knew which word she wanted to say. Pt states that she also has been saying the wrong word and confused the numbers in her brother's phone number.  Pt denies any new medications, HA, visual changes, focal weakness or difficulty ambulating.     Timing: constant  Duration:  2 days  Location: N/A  Radiation: N/A  Quality: dysarthria  Intensity/Severity: moderate   Associated Symptoms:none  Aggravating Factors:none  Alleviating Factors:none   Previous Episodes:Pt denies similar symptoms previously.  Treatment before arrival: none    MEDICAL RECORD REVIEW  Pt was prescribed Macrobid on 11-29-17 for an UTI.    ALLERGIES  Ahist [chlorpheniramine]; Amoxicillin; Clarithromycin; Diclofenac; Diphenhydramine; Esomeprazole; Ibuprofen; Levalbuterol; Levocetirizine; Lipitor [atorvastatin]; Lodine [etodolac]; Omeprazole; Pravachol [pravastatin]; Quinapril; Sulfa antibiotics; Sulindac; Valdecoxib; and Prednisone    PAST MEDICAL HISTORY  Active Ambulatory Problems     Diagnosis Date Noted   • Neck and shoulder pain 03/24/2017   • Arthropathy of shoulder region 03/24/2017   • Carpal tunnel syndrome of left wrist 03/24/2017   • Chronic pain of both shoulders 06/27/2017   • Chronic left shoulder pain 12/05/2017   • Arthropathy of left shoulder 12/05/2017     Resolved Ambulatory Problems     Diagnosis Date Noted   • No Resolved Ambulatory Problems     Past Medical History:   Diagnosis Date   • Abdominal pain    • Cough    • Diarrhea    • Difficulty breathing    • Dizziness    • DM type 2 (diabetes mellitus,  type 2)        PAST SURGICAL HISTORY  Past Surgical History:   Procedure Laterality Date   • CARDIAC CATHETERIZATION     • CHOLECYSTECTOMY     • CORONARY STENT PLACEMENT     • HERNIA REPAIR     • HYSTERECTOMY     • PACEMAKER IMPLANTATION     • REPLACEMENT TOTAL KNEE         FAMILY HISTORY  History reviewed. No pertinent family history.    SOCIAL HISTORY  Social History     Social History   • Marital status: Single     Spouse name: N/A   • Number of children: N/A   • Years of education: N/A     Occupational History   • Not on file.     Social History Main Topics   • Smoking status: Never Smoker   • Smokeless tobacco: Never Used   • Alcohol use No   • Drug use: No   • Sexual activity: Not on file     Other Topics Concern   • Not on file     Social History Narrative       REVIEW OF SYSTEMS  Review of Systems   Constitutional: Negative for activity change, appetite change, diaphoresis and fever.   HENT: Negative for trouble swallowing.    Eyes: Negative for visual disturbance.   Respiratory: Negative for cough, chest tightness, shortness of breath and wheezing.    Cardiovascular: Negative for chest pain, palpitations and leg swelling.   Gastrointestinal: Negative for abdominal pain, diarrhea, nausea and vomiting.   Genitourinary: Negative for dysuria.   Musculoskeletal: Negative for back pain.   Skin: Negative for rash.   Neurological: Positive for speech difficulty. Negative for dizziness and light-headedness.       PHYSICAL EXAM  ED Triage Vitals   Temp Heart Rate Resp BP SpO2   12/07/17 1144 12/07/17 1144 12/07/17 1144 12/07/17 1148 12/07/17 1144   97.4 °F (36.3 °C) 74 16 166/95 96 %      Temp src Heart Rate Source Patient Position BP Location FiO2 (%)   12/07/17 1144 12/07/17 1144 -- -- --   Tympanic Monitor        Physical Exam   Constitutional: She is oriented to person, place, and time and well-developed, well-nourished, and in no distress. No distress.   HENT:   Head: Normocephalic and atraumatic.    Mouth/Throat: Uvula is midline and mucous membranes are normal.   Eyes: EOM are normal. Pupils are equal, round, and reactive to light.   Neck: Normal range of motion. Neck supple.   Cardiovascular: S1 normal, S2 normal and normal heart sounds.  Exam reveals no gallop and no friction rub.    No murmur heard.  Pulses:       Dorsalis pedis pulses are 2+ on the right side, and 2+ on the left side.   Pulmonary/Chest: Effort normal and breath sounds normal. She has no decreased breath sounds. She has no wheezes. She has no rhonchi. She has no rales.   Abdominal: Soft. Normal appearance. There is no rebound and no guarding.   obese   Musculoskeletal: Normal range of motion. She exhibits no edema.   Neurological: She is alert and oriented to person, place, and time. She has normal motor skills and intact cranial nerves. She displays abnormal speech (dysarthria). She shows no pronator drift. GCS score is 15.   Cranial nerves 2-12 intact as tested.  Sensation intact.  5/5 strength in all extremities.  Normal cerebellar testing.  No drift in any extremity. No neglect/extinction.    Skin: Skin is warm, dry and intact.   Psychiatric: Affect and judgment normal.   Nursing note and vitals reviewed.      LAB RESULTS  Recent Results (from the past 24 hour(s))   Comprehensive Metabolic Panel    Collection Time: 12/07/17 12:06 PM   Result Value Ref Range    Glucose 172 (H) 65 - 99 mg/dL    BUN 22 8 - 23 mg/dL    Creatinine 1.07 (H) 0.57 - 1.00 mg/dL    Sodium 144 136 - 145 mmol/L    Potassium 4.2 3.5 - 5.2 mmol/L    Chloride 104 98 - 107 mmol/L    CO2 26.7 22.0 - 29.0 mmol/L    Calcium 9.4 8.6 - 10.5 mg/dL    Total Protein 7.5 6.0 - 8.5 g/dL    Albumin 4.40 3.50 - 5.20 g/dL    ALT (SGPT) 21 1 - 33 U/L    AST (SGOT) 29 1 - 32 U/L    Alkaline Phosphatase 115 39 - 117 U/L    Total Bilirubin 0.6 0.1 - 1.2 mg/dL    eGFR Non African Amer 49 (L) >60 mL/min/1.73    Globulin 3.1 gm/dL    A/G Ratio 1.4 g/dL    BUN/Creatinine Ratio 20.6 7.0 -  25.0    Anion Gap 13.3 mmol/L   Protime-INR    Collection Time: 12/07/17 12:06 PM   Result Value Ref Range    Protime 28.8 (H) 11.7 - 14.2 Seconds    INR 2.82 (H) 0.90 - 1.10   Type & Screen    Collection Time: 12/07/17 12:06 PM   Result Value Ref Range    ABO Type O     RH type Positive     Antibody Screen Negative    CBC Auto Differential    Collection Time: 12/07/17 12:06 PM   Result Value Ref Range    WBC 11.42 (H) 4.50 - 10.70 10*3/mm3    RBC 4.32 3.90 - 5.20 10*6/mm3    Hemoglobin 13.9 11.9 - 15.5 g/dL    Hematocrit 43.1 35.6 - 45.5 %    MCV 99.8 (H) 80.5 - 98.2 fL    MCH 32.2 (H) 26.9 - 32.0 pg    MCHC 32.3 (L) 32.4 - 36.3 g/dL    RDW 14.1 (H) 11.7 - 13.0 %    RDW-SD 51.8 37.0 - 54.0 fl    MPV 10.6 6.0 - 12.0 fL    Platelets 195 140 - 500 10*3/mm3    Neutrophil % 52.8 42.7 - 76.0 %    Lymphocyte % 43.0 19.6 - 45.3 %    Monocyte % 2.9 (L) 5.0 - 12.0 %    Eosinophil % 0.7 0.3 - 6.2 %    Basophil % 0.2 0.0 - 1.5 %    Immature Grans % 0.4 0.0 - 0.5 %    Neutrophils, Absolute 6.04 1.90 - 8.10 10*3/mm3    Lymphocytes, Absolute 4.91 (H) 0.90 - 4.80 10*3/mm3    Monocytes, Absolute 0.33 0.20 - 1.20 10*3/mm3    Eosinophils, Absolute 0.08 0.00 - 0.70 10*3/mm3    Basophils, Absolute 0.02 0.00 - 0.20 10*3/mm3    Immature Grans, Absolute 0.04 (H) 0.00 - 0.03 10*3/mm3   Light Blue Top    Collection Time: 12/07/17 12:06 PM   Result Value Ref Range    Extra Tube hold for add-on    Green Top (Gel)    Collection Time: 12/07/17 12:06 PM   Result Value Ref Range    Extra Tube Hold for add-ons.    Lavender Top    Collection Time: 12/07/17 12:06 PM   Result Value Ref Range    Extra Tube hold for add-on    Gold Top - SST    Collection Time: 12/07/17 12:06 PM   Result Value Ref Range    Extra Tube Hold for add-ons.    POC Glucose Fingerstick    Collection Time: 12/07/17  1:43 PM   Result Value Ref Range    Glucose 142 (H) 70 - 130 mg/dL       I ordered the above labs and reviewed the results    RADIOLOGY  CT Head Without Contrast  Stroke Protocol   Final Result   No evidence for acute intracranial pathology. If there continues to be   suspicion for an acute infarct, further evaluation could be performed   with MR imaging for much more sensitive and specific evaluation.       Radiation dose reduction techniques were utilized, including automated   exposure control and exposure modulation based on body size.       This report was finalized on 12/7/2017 2:43 PM by Dr. Anderson Bruce MD.          XR Chest 1 View         MRI Brain With & Without Contrast    (Results Pending)     Reviewed pt's CXR, which shows nothing acute. Independently viewed by me. Interpreted by radiologist.    I ordered the above noted radiological studies and reviewed the images on the PACS system.      MEDICATIONS GIVEN IN ER  Medications   sodium chloride 0.9 % flush 10 mL (not administered)       PROCEDURES  Procedures    COURSE & MEDICAL DECISION MAKING  Pertinent Labs and Imaging studies that were ordered and reviewed are noted above.  Results were reviewed/discussed with the patient and they were also made aware of online assess.  Pt also made aware that some labs, such as cultures, will not be resulted during ER visit and follow up with PMD is necessary.     PROGRESS AND CONSULTS    Progress Notes:    ED Course     Pt is not a tPA candidate since onset of symptoms was greater than 24 hours ago.    1444- Discussed the pt's case with Dr. Bruce (radiology) regarding the pt's non-MRI compatible pacemaker.     1449- Discussed the pt's case with Dr. Orellana (stroke neurology), who requests that I admit the pt to have a CTA Head and Neck tomorrow.    1452- Rechecked pt. Pt is resting comfortably. Notified pt of the consult with Dr. Orellana (neurology). Discussed the plan to admit the pt for further evaluation and additional testing. Pt and family agree with the plan and all questions were addressed.    1513- Discussed the pt's case with Dr. Cruz ([facility]) who agrees to  "admit the pt to neurotelemetry bed.  1517- Reviewed pt's history and workup with Dr. Collins.  After a bedside evaluation; Dr Collins agrees with the plan of care.    Based on the patient's lab findings and presenting symptoms, the doctor and I feel it is appropriate to admit the patient for further management, evaluation, and treatment.  I have discussed this with the admitting team.  I have also discussed this with the patient/family.  They are in agreement with admission.      Disposition vitals:  BP (!) 171/106  Pulse 80  Temp 97.4 °F (36.3 °C) (Tympanic)   Resp 16  Ht 165.1 cm (65\")  Wt 102 kg (225 lb)  SpO2 95%  BMI 37.44 kg/m2      DIAGNOSIS  Final diagnoses:   Dysarthria     Documentation assistance provided by javi Gomez for KRISTIN Blount.  Information recorded by the javi was done at my direction and has been verified and validated by me.  Electronically signed by Rona Gomez on 12/7/2017 at time 3:21 PM     Rona Gomez  12/07/17 1521       KRISTIN Celaya  12/07/17 1522    "

## 2017-12-07 NOTE — PLAN OF CARE
Problem: Patient Care Overview (Adult)  Goal: Plan of Care Review  Outcome: Ongoing (interventions implemented as appropriate)    12/07/17 1810   Coping/Psychosocial Response Interventions   Plan Of Care Reviewed With patient   Patient Care Overview   Progress progress toward functional goals as expected   Outcome Evaluation   Outcome Summary/Follow up Plan PT admitted to floor fromED. NIH 1 & passed swallow study. vital signs stable. no C/O nausea or vomiting. LOCX4. assist X1 when ambulating. will continue to monitor.          Problem: Stroke (Ischemic) (Adult)  Goal: Signs and Symptoms of Listed Potential Problems Will be Absent or Manageable (Stroke)  Outcome: Ongoing (interventions implemented as appropriate)    12/07/17 1810   Stroke (Ischemic)   Problems Assessed (Stroke (Ischemic)/TIA) all   Problems Present (Stroke (Ischemic)/TIA) situational response

## 2017-12-07 NOTE — ED PROVIDER NOTES
"I supervised care provided by the midlevel provider.    We have discussed this patient's history, physical exam, and treatment plan.   I have reviewed the note and personally saw and examined the patient and agree with the plan of care.        Pt reports that she was last known well at her neurological baseline about 2 days ago. Pt presents to the ED c/o difficulty with speech onset yesterday. Pt reports that she has had expressive aphasia in which, \"I know what I want to say, but I'm having trouble saying it\". Pt denies focal weakness/numbness, visual difficulties, chest pain, dyspnea, headache, and abdominal pain. Pt reports that she is currently on Coumadin, but is unsure as to why she is on it. On physical exam, pt moves all extremities. Speech is normal; pt exhibits no dysarthria or expressive aphasia currently. Pt's CT Head is negative acute. Pt was admitted to the hospitalist for further evaluation and management.    Pt is not a candidate for tPA as pt was last known well at her neurological baseline about 2 days ago; pt's symptoms began yesterday.     Dr. Veliz - cardiologist      EKG:           EKG time: 13:36  Rhythm/Rate: Paced rhythm rate 84    Interpreted Contemporaneously by me, independently viewed  Similar compared to prior 06/21/14          Documentation assistance provided by Mario Alejandre. Information recorded by the scribe was done at my direction and has been verified and validated by me.     Entered by Mario Alejandre, acting as scribe for Dr. Karina MD.               Mario Alejandre  12/07/17 1536       Guillermo Collins MD  12/07/17 [id number]    "

## 2017-12-07 NOTE — PROGRESS NOTES
"Clinical Pharmacy Services: Medication History    Caitlyn Longoria is a 83 y.o. female presenting to [facility] for   Chief Complaint   Patient presents with   • Speech Problem     has not been able to articulate well since yesterday - \"also having trouble with numbers trying to make a phone call\"       She  has a past medical history of Abdominal pain; Cough; Diarrhea; Difficulty breathing; Dizziness; and DM type 2 (diabetes mellitus, type 2).    Allergies as of 12/07/2017 - [place] as Reviewed 12/07/2017   Allergen Reaction Noted   • Ahist [chlorpheniramine]  03/24/2017   • Amoxicillin  04/20/2016   • Clarithromycin  04/20/2016   • Diclofenac  04/20/2016   • Diphenhydramine  04/20/2016   • Esomeprazole  04/20/2016   • Ibuprofen Other (See Comments) 03/24/2017   • Levalbuterol  04/20/2016   • Levocetirizine  04/20/2016   • Lipitor [atorvastatin]  05/12/2016   • Lodine [etodolac]  03/24/2017   • Omeprazole  04/20/2016   • Pravachol [pravastatin]  03/24/2017   • Quinapril Swelling and Confusion 04/20/2016   • Sulfa antibiotics  04/20/2016   • Sulindac  04/20/2016   • Valdecoxib Irritability 04/20/2016   • Prednisone Rash 04/20/2016       Medication information was obtained from: Patient, medication list, pharmacy  Pharmacy and Phone Number: [facility] [phone]    Prior to Admission Medications     Prescriptions Last Dose Informant Patient Reported? Taking?    acetaminophen (TYLENOL) 650 MG 8 hr tablet  Self Yes Yes    Take 650 mg by mouth 2 (Two) Times a Day As Needed. PRN    Acetylcysteine (NAC PO)  Self Yes Yes    Take  by mouth As Needed.    azelastine (ASTEPRO) 0.15 % solution nasal spray  Self Yes Yes    2 sprays into each nostril 2 (Two) Times a Day.    azelastine (OPTIVAR) 0.05 % ophthalmic solution  Self Yes Yes    Administer 1 drop to both eyes 2 (Two) Times a Day.    baclofen (LIORESAL) 10 MG tablet  Pharmacy Yes Yes    Take 10 mg by mouth 3 (Three) Times a Day.    BIOTIN MAXIMUM STRENGTH PO  " Self Yes Yes    Take 1 capsule by mouth Every Morning.    bumetanide (BUMEX) 1 MG tablet  Self Yes Yes    Take 1 mg by mouth Every Morning.    cetirizine (ZyrTEC) 10 MG tablet  Self Yes Yes    Take 10 mg by mouth Daily.    cholecalciferol (VITAMIN D3) 1000 units tablet  Self Yes Yes    Take 1,000 Units by mouth Daily With Dinner.    ciprofloxacin (CIPRO) 500 MG tablet 12/5/2017 Pharmacy Yes No    Take 500 mg by mouth 2 (Two) Times a Day. Completed 12/5    clotrimazole-betamethasone (LOTRISONE) 1-0.05 % cream  Self Yes Yes    Apply  topically 2 (Two) Times a Day.    fenofibrate (TRICOR) 145 MG tablet  Self Yes Yes    Take 145 mg by mouth Daily.    flunisolide (NASALIDE) 25 MCG/ACT (0.025%) solution nasal spray  Self Yes Yes    Inhale 2 sprays Every 12 (Twelve) Hours.    glipiZIDE (GLUCOTROL) 5 MG tablet  Self Yes Yes    Take 5 mg by mouth Every Morning.    Loperamide HCl (IMODIUM PO)  Self Yes Yes    Take 1 tablet by mouth Every Morning.    Melatonin 3 MG tablet  Self Yes Yes    Take 3 mg by mouth Every Night.    Omega-3 Fatty Acids (FISH OIL) 1000 MG capsule capsule  Self Yes Yes    Take 1,000 mg by mouth 2 (Two) Times a Day With Meals.    oxazepam (SERAX) 10 MG capsule  Self Yes Yes    Take 10 mg by mouth Every Night.    pantoprazole (PROTONIX) 40 MG EC tablet  Self Yes Yes    Take 40 mg by mouth 2 (Two) Times a Day With Meals.    potassium chloride (K-DUR,KLOR-CON) 20 MEQ CR tablet  Self Yes Yes    Take 20 mEq by mouth Every Morning.    Probiotic Product (ALIGN) 4 MG capsule  Self Yes Yes    Take 1 capsule by mouth Every Morning.    raNITIdine (ZANTAC) 300 MG tablet  Pharmacy Yes Yes    Take 300 mg by mouth Every Night.    warfarin (COUMADIN) 4 MG tablet  Pharmacy Yes Yes    Take 4 mg by mouth Daily.            Medication notes: Patient provided a medication list but said she had recently completed an antibiotic and was also prescribed 2 other new medications she did not know the names of. Contacted patient's  pharmacy and added:    Cipro-patient says she completed therapy on 12/5  Baclofen and Ranitidine-prescribed 11/30/17    Added per patient list: Fenofibrate-replaces Crestor  Align-replacing Probiotic  Vitamin D-3    Removed: Oxybutynin-was not on patient med list and pharmacy had no record in 2017    This medication list is complete to the best of my knowledge as of 12/7/2017    Please call if questions.    Greta Ruiz, Medication History Technician  12/7/2017 4:35 PM

## 2017-12-07 NOTE — H&P
HISTORY AND PHYSICAL   [facility]        Patient Identification:  Name: Caitlyn Longoria  Age: 83 y.o.  Sex: female  :  1934  MRN: [id number]                     Primary Care Physician: Zenaida Suarez MD    Chief Complaint:  Speech problems    History of Present Illness:       The patient's an 83-year-old white female with history of chronic atrial fib, diabetes mellitus type 2, coronary artery disease with history of stent, pacemaker, and arthritis who is admitted with a 2 day 3 day history of having some speech problems.  She had some trouble speaking and expressing herself.  She been on antibiotics with Cipro for recent urinary tract infection.  She finished those a couple of days ago.  She's also had some problems with  arthritis and shoulder problems and had her left shoulder injected with cortisone recently.  She had a cough and some bronchitis recently she'd seen Dr. Cota and was given her some other medicines for her reflux symptoms.  The patient was evaluated in the ER and admitted for further evaluation treatment of her symptoms with concern for possible stroke or TIA.  She denies any chest pain or shortness of air.  She's not had any fevers or chills.  She's not had any headaches.  She denies any nausea vomiting.  She does have chronic atrial fibrillation and is been on Coumadin and her INR is in the therapeutic range.    Past Medical History:  Past Medical History:   Diagnosis Date   • Abdominal pain    • Cough    • Diarrhea    • Difficulty breathing    • Dizziness    • DM type 2 (diabetes mellitus, type 2)      Past Surgical History:  Past Surgical History:   Procedure Laterality Date   • CARDIAC CATHETERIZATION     • CHOLECYSTECTOMY     • CORONARY STENT PLACEMENT     • HERNIA REPAIR     • HYSTERECTOMY     • PACEMAKER IMPLANTATION     • REPLACEMENT TOTAL KNEE        Home Meds:  Prescriptions Prior to Admission   Medication Sig Dispense Refill Last Dose   •  acetaminophen (TYLENOL) 650 MG 8 hr tablet Take 650 mg by mouth 2 (Two) Times a Day As Needed. PRN   Taking   • Acetylcysteine (NAC PO) Take  by mouth As Needed.   Taking   • azelastine (ASTEPRO) 0.15 % solution nasal spray 2 sprays into each nostril 2 (Two) Times a Day.      • azelastine (OPTIVAR) 0.05 % ophthalmic solution Administer 1 drop to both eyes 2 (Two) Times a Day.   Taking   • baclofen (LIORESAL) 10 MG tablet Take 10 mg by mouth 3 (Three) Times a Day.   Taking   • BIOTIN MAXIMUM STRENGTH PO Take 1 capsule by mouth Every Morning.   Taking   • bumetanide (BUMEX) 1 MG tablet Take 1 mg by mouth Every Morning.   Taking   • cetirizine (ZyrTEC) 10 MG tablet Take 10 mg by mouth Daily.   Not Taking   • cholecalciferol (VITAMIN D3) 1000 units tablet Take 1,000 Units by mouth Daily With Dinner.      • clotrimazole-betamethasone (LOTRISONE) 1-0.05 % cream Apply  topically 2 (Two) Times a Day.   Taking   • fenofibrate (TRICOR) 145 MG tablet Take 145 mg by mouth Daily.      • flunisolide (NASALIDE) 25 MCG/ACT (0.025%) solution nasal spray Inhale 2 sprays Every 12 (Twelve) Hours.      • glipiZIDE (GLUCOTROL) 5 MG tablet Take 5 mg by mouth Every Morning.   Taking   • Loperamide HCl (IMODIUM PO) Take 1 tablet by mouth Every Morning.   Taking   • Melatonin 3 MG tablet Take 3 mg by mouth Every Night.      • Omega-3 Fatty Acids (FISH OIL) 1000 MG capsule capsule Take 1,000 mg by mouth 2 (Two) Times a Day With Meals.   Taking   • oxazepam (SERAX) 10 MG capsule Take 10 mg by mouth Every Night.   Taking   • pantoprazole (PROTONIX) 40 MG EC tablet Take 40 mg by mouth 2 (Two) Times a Day With Meals.   Taking   • potassium chloride (K-DUR,KLOR-CON) 20 MEQ CR tablet Take 20 mEq by mouth Every Morning.   Taking   • Probiotic Product (ALIGN) 4 MG capsule Take 1 capsule by mouth Every Morning.      • raNITIdine (ZANTAC) 300 MG tablet Take 300 mg by mouth Every Night.   Taking   • warfarin (COUMADIN) 4 MG tablet Take 4 mg by mouth  Daily.      • ciprofloxacin (CIPRO) 500 MG tablet Take 500 mg by mouth 2 (Two) Times a Day. Completed 12/5 12/5/2017       Allergies:  Allergies   Allergen Reactions   • Ahist [Chlorpheniramine] Nausea Only, Other (See Comments) and Dizziness     Headache, Blurred vision   • Amoxicillin Other (See Comments)     Yeast infection   • Clarithromycin Nausea Only     Other reaction(s): Headache, Depression, Flushed   • Diclofenac      Voltaren   • Diphenhydramine      Benadryl   • Esomeprazole      Nexium. Stomach issues   • Ibuprofen Other (See Comments)     Does not recall    • Levalbuterol Swelling     Xopenex   • Levocetirizine Other (See Comments)     Xyzal. Diarrhea, Stomach cramps   • Lipitor [Atorvastatin] Other (See Comments)     Muscle pain and weakness, dark urine   • Lodine [Etodolac]    • Omeprazole Nausea Only     Prilosec. Headache   • Pravachol [Pravastatin] Nausea Only and Other (See Comments)     Bloated, Constipation, Headaches   • Quinapril Swelling and Confusion     Accupril. Headaches, constipation   • Sulfa Antibiotics    • Sulindac      Other reaction(s): Headache, joint pain, bruising   • Valdecoxib Irritability   • Prednisone Rash     Immunizations:    There is no immunization history on file for this patient.  Social History:   Social History     Social History Narrative     Social History     Social History   • Marital status: Single     Spouse name: N/A   • Number of children: N/A   • Years of education: N/A     Occupational History   • Not on file.     Social History Main Topics   • Smoking status: Never Smoker   • Smokeless tobacco: Never Used   • Alcohol use No   • Drug use: No   • Sexual activity: Not on file     Other Topics Concern   • Not on file     Social History Narrative       Family History:  History reviewed. No pertinent family history.     Review of Systems  See history of present illness and past medical history.  Patient denies headache, dizziness, syncope, falls, trauma,  "change in vision, change in hearing, change in taste, changes in weight, changes in appetite, focal weakness, numbness, or paresthesia.  Patient denies chest pain, palpitations, dyspnea, orthopnea, PND,  sinus pressure, rhinorrhea, epistaxis, hemoptysis, nausea, vomiting,hematemesis, diarrhea, constipation or hematchezia.  Denies cold or heat intolerance, polydipsia, polyuria, polyphagia. Denies hematuria, pyuria, dysuria, hesitancy, frequency or urgency.   Denies fever, chills, sweats, night sweats.   Remainder of ROS is negative.    Objective:  tMax 24 hrs: Temp (24hrs), Av.9 °F (36.6 °C), Min:97.4 °F (36.3 °C), Max:98.4 °F (36.9 °C)    Vitals Ranges:   Temp:  [97.4 °F (36.3 °C)-98.4 °F (36.9 °C)] 98.4 °F (36.9 °C)  Heart Rate:  [74-81] 81  Resp:  [16-18] 18  BP: (152-171)/() 158/104      Exam:  BP (!) 158/104 (BP Location: Right arm, Patient Position: Lying)  Pulse 81  Temp 98.4 °F (36.9 °C) (Oral)   Resp 18  Ht 165.1 cm (65\")  Wt 102 kg (225 lb)  SpO2 94%  BMI 37.44 kg/m2    General Appearance:    Alert, cooperative, no distress, appears stated age   Head:    Normocephalic, without obvious abnormality, atraumatic   Eyes:    PERRL, conjunctiva/corneas clear, EOM's intact, both eyes   Ears:    Normal external ear canals, both ears   Nose:   Nares normal, septum midline, mucosa normal, no drainage    or sinus tenderness   Throat:   Lips, mucosa, and tongue normal   Neck:   Supple, symmetrical, trachea midline, no adenopathy;     thyroid:  no enlargement/tenderness/nodules; no carotid    bruit or JVD   Back:     Symmetric, no curvature, ROM normal, no CVA tenderness   Lungs:     Clear to auscultation bilaterally, respirations unlabored   Chest Wall:    No tenderness or deformity    Heart:    Regular rate and rhythm, S1 and S2 normal, no murmur, rub   or gallop   Abdomen:     Soft, non-tender, bowel sounds active all four quadrants,     no masses, no hepatomegaly, no splenomegaly   Extremities:   " Extremities normal, atraumatic, no cyanosis or edema   Pulses:   2+ and symmetric all extremities   Skin:   Skin color, texture, turgor normal, no rashes or lesions   Lymph nodes:   Cervical, supraclavicular, and axillary nodes normal   Neurologic:   CNII-XII intact, normal strength, sensation intact throughout, expressive aphasia and dysarthria      .    Data Review:  Lab Results (last 72 hours)     Procedure Component Value Units Date/Time    CBC & Differential [[id number]] Collected:  12/07/17 1206    Specimen:  Blood Updated:  12/07/17 1232    Narrative:       The following orders were created for panel order CBC & Differential.  Procedure                               Abnormality         Status                     ---------                               -----------         ------                     CBC Auto Differential[[id number]]        Abnormal            Final result                 Please view results for these tests on the individual orders.    CBC Auto Differential [[id number]]  (Abnormal) Collected:  12/07/17 1206    Specimen:  Blood Updated:  12/07/17 1232     WBC 11.42 (H) 10*3/mm3      RBC 4.32 10*6/mm3      Hemoglobin 13.9 g/dL      Hematocrit 43.1 %      MCV 99.8 (H) fL      MCH 32.2 (H) pg      MCHC 32.3 (L) g/dL      RDW 14.1 (H) %      RDW-SD 51.8 fl      MPV 10.6 fL      Platelets 195 10*3/mm3      Neutrophil % 52.8 %      Lymphocyte % 43.0 %      Monocyte % 2.9 (L) %      Eosinophil % 0.7 %      Basophil % 0.2 %      Immature Grans % 0.4 %      Neutrophils, Absolute 6.04 10*3/mm3      Lymphocytes, Absolute 4.91 (H) 10*3/mm3      Monocytes, Absolute 0.33 10*3/mm3      Eosinophils, Absolute 0.08 10*3/mm3      Basophils, Absolute 0.02 10*3/mm3      Immature Grans, Absolute 0.04 (H) 10*3/mm3     Protime-INR [[id number]]  (Abnormal) Collected:  12/07/17 1206    Specimen:  Blood Updated:  12/07/17 1238     Protime 28.8 (H) Seconds      INR 2.82 (H)    Comprehensive Metabolic Panel [[id number]]   (Abnormal) Collected:  12/07/17 1206    Specimen:  Blood Updated:  12/07/17 1251     Glucose 172 (H) mg/dL      BUN 22 mg/dL      Creatinine 1.07 (H) mg/dL      Sodium 144 mmol/L      Potassium 4.2 mmol/L      Chloride 104 mmol/L      CO2 26.7 mmol/L      Calcium 9.4 mg/dL      Total Protein 7.5 g/dL      Albumin 4.40 g/dL      ALT (SGPT) 21 U/L      AST (SGOT) 29 U/L      Alkaline Phosphatase 115 U/L      Total Bilirubin 0.6 mg/dL      eGFR Non African Amer 49 (L) mL/min/1.73      Globulin 3.1 gm/dL      A/G Ratio 1.4 g/dL      BUN/Creatinine Ratio 20.6     Anion Gap 13.3 mmol/L     Narrative:       The MDRD GFR formula is only valid for adults with stable renal function between ages 18 and 70.    [place] Draw [[id number]] Collected:  12/07/17 1206    Specimen:  Blood Updated:  12/07/17 1316    Narrative:       The following orders were created for panel order [place] Draw.  Procedure                               Abnormality         Status                     ---------                               -----------         ------                     Light Blue Top[[id number]]                                   Final result               Green Top (Gel)[[id number]]                                  Final result               Lavender Top[[id number]]                                     Final result               Gold Top - SST[[id number]]                                   Final result                 Please view results for these tests on the individual orders.    Light Blue Top [[id number]] Collected:  12/07/17 1206    Specimen:  Blood Updated:  12/07/17 1316     Extra Tube hold for add-on      Auto resulted       Green Top (Gel) [[id number]] Collected:  12/07/17 1206    Specimen:  Blood Updated:  12/07/17 1316     Extra Tube Hold for add-ons.      Auto resulted.       Lavender Top [[id number]] Collected:  12/07/17 1206    Specimen:  Blood Updated:  12/07/17 1316     Extra Tube hold for add-on      Auto resulted       Gold Top -  SST [[id number]] Collected:  12/07/17 1206    Specimen:  Blood Updated:  12/07/17 1316     Extra Tube Hold for add-ons.      Auto resulted.       POC Glucose Fingerstick [[id number]]  (Abnormal) Collected:  12/07/17 1343    Specimen:  Blood Updated:  12/07/17 1358     Glucose 142 (H) mg/dL     Narrative:       Meter: [id number] : [id number] Davina Cheema  Tech                   Imaging Results (all)     Procedure Component Value Units Date/Time    XR Chest 1 View [[id number]] Collected:  12/07/17 1412     Updated:  12/07/17 1412    Narrative:       ONE VIEW PORTABLE CHEST     HISTORY: Shortness of breath.     There is prominent elevation of the left hemidiaphragm unchanged from  previous studies. The heart remains enlarged with a pacemaker in place.  The right lung is clear except for a minimal linear band of atelectasis.  There is some localized atelectasis near the left hilum that is  unchanged from 2014. No significant acute abnormality is seen.       CT Head Without Contrast Stroke Protocol [[id number]] Collected:  12/07/17 1345     Updated:  12/07/17 1446    Narrative:       CT SCAN OF THE HEAD WITHOUT CONTRAST     CLINICAL HISTORY:  Confusion.     TECHNIQUE: CT scan of the head was obtained with 3 mm axial images. No  intravenous contrast was administered.     FINDINGS:  The ventricles, sulci, and cisterns are age appropriate. The basal  ganglia and thalami are unremarkable in appearance. The posterior fossa  structures are within normal limits.       Impression:       No evidence for acute intracranial pathology. If there continues to be  suspicion for an acute infarct, further evaluation could be performed  with MR imaging for much more sensitive and specific evaluation.     Radiation dose reduction techniques were utilized, including automated  exposure control and exposure modulation based on body size.     This report was finalized on 12/7/2017 2:43 PM by Dr. Anderson Bruce MD.           Patient Active  Problem List   Diagnosis Code   • Neck and shoulder pain M54.2, M25.519   • Arthropathy of shoulder region M19.019   • Carpal tunnel syndrome of left wrist G56.02   • Chronic pain of both shoulders M25.511, G89.29, M25.512   • Chronic left shoulder pain M25.512, G89.29   • Arthropathy of left shoulder M19.012   • Dysarthria R47.1   • DM type 2 (diabetes mellitus, type 2) E11.9   • Atrial fibrillation I48.91   • HLD (hyperlipidemia) E78.5   • Bronchitis J40   • CAD (coronary artery disease), hx of stent I25.10       Assessment:  Active Hospital Problems (** Indicates Principal Problem)    Diagnosis Date Noted   • **Dysarthria [R47.1] 12/07/2017   • DM type 2 (diabetes mellitus, type 2) [E11.9] 12/07/2017   • Atrial fibrillation [I48.91] 12/07/2017   • HLD (hyperlipidemia) [E78.5] 12/07/2017   • Bronchitis [J40] 12/07/2017   • CAD (coronary artery disease), hx of stent [I25.10] 12/07/2017   • Chronic left shoulder pain [M25.512, G89.29] 12/05/2017   • Arthropathy of left shoulder [M19.012] 12/05/2017   • Chronic pain of both shoulders [M25.511, G89.29, M25.512] 06/27/2017   • Arthropathy of shoulder region [M19.019] 03/24/2017   • Neck and shoulder pain [M54.2, M25.519] 03/24/2017   • Carpal tunnel syndrome of left wrist [G56.02] 03/24/2017      Resolved Hospital Problems    Diagnosis Date Noted Date Resolved   No resolved problems to display.       Plan:  The patient admitted the hospital and cannot get MRI due to pacemaker.  We'll check CT angiogram of head and neck and consult neurology for further evaluation.  The patient also will receive aspirin and a statin.  Will ask physical therapy occupational therapy and speech therapy to evaluate.  We'll also check respiratory PCR    Saman Cruz MD  12/7/2017  9:15 PM

## 2017-12-08 ENCOUNTER — APPOINTMENT (OUTPATIENT)
Dept: CARDIOLOGY | Facility: HOSPITAL | Age: 82
End: 2017-12-08
Attending: HOSPITALIST

## 2017-12-08 ENCOUNTER — APPOINTMENT (OUTPATIENT)
Dept: CARDIOLOGY | Facility: HOSPITAL | Age: 82
End: 2017-12-08
Attending: RADIOLOGY

## 2017-12-08 LAB
AORTIC ARCH: 3 CM
ASCENDING AORTA: 3.1 CM
B PERT DNA SPEC QL NAA+PROBE: NOT DETECTED
BH CV ECHO MEAS - ACS: 1.7 CM
BH CV ECHO MEAS - AO MAX PG (FULL): 5.5 MMHG
BH CV ECHO MEAS - AO MAX PG: 8 MMHG
BH CV ECHO MEAS - AO MEAN PG (FULL): 3 MMHG
BH CV ECHO MEAS - AO MEAN PG: 4 MMHG
BH CV ECHO MEAS - AO ROOT AREA (BSA CORRECTED): 1.6
BH CV ECHO MEAS - AO ROOT AREA: 8.6 CM^2
BH CV ECHO MEAS - AO ROOT DIAM: 3.3 CM
BH CV ECHO MEAS - AO V2 MAX: 141 CM/SEC
BH CV ECHO MEAS - AO V2 MEAN: 99.3 CM/SEC
BH CV ECHO MEAS - AO V2 VTI: 27.4 CM
BH CV ECHO MEAS - ASC AORTA: 3.1 CM
BH CV ECHO MEAS - AVA(I,A): 1.6 CM^2
BH CV ECHO MEAS - AVA(I,D): 1.6 CM^2
BH CV ECHO MEAS - AVA(V,A): 1.6 CM^2
BH CV ECHO MEAS - AVA(V,D): 1.6 CM^2
BH CV ECHO MEAS - BSA(HAYCOCK): 2.2 M^2
BH CV ECHO MEAS - BSA: 2.1 M^2
BH CV ECHO MEAS - BZI_BMI: 37.4 KILOGRAMS/M^2
BH CV ECHO MEAS - BZI_METRIC_HEIGHT: 165.1 CM
BH CV ECHO MEAS - BZI_METRIC_WEIGHT: 102.1 KG
BH CV ECHO MEAS - CONTRAST EF (2CH): 20.3 ML/M^2
BH CV ECHO MEAS - CONTRAST EF 4CH: 23.4 ML/M^2
BH CV ECHO MEAS - EDV(CUBED): 64 ML
BH CV ECHO MEAS - EDV(MOD-SP2): 74 ML
BH CV ECHO MEAS - EDV(MOD-SP4): 94 ML
BH CV ECHO MEAS - EDV(TEICH): 70 ML
BH CV ECHO MEAS - EF(CUBED): 27.1 %
BH CV ECHO MEAS - EF(MOD-SP2): 20.3 %
BH CV ECHO MEAS - EF(MOD-SP4): 23.4 %
BH CV ECHO MEAS - EF(TEICH): 22.2 %
BH CV ECHO MEAS - ESV(CUBED): 46.7 ML
BH CV ECHO MEAS - ESV(MOD-SP2): 59 ML
BH CV ECHO MEAS - ESV(MOD-SP4): 72 ML
BH CV ECHO MEAS - ESV(TEICH): 54.4 ML
BH CV ECHO MEAS - FS: 10 %
BH CV ECHO MEAS - IVS/LVPW: 1.2
BH CV ECHO MEAS - IVSD: 1.3 CM
BH CV ECHO MEAS - LAT PEAK E' VEL: 5 CM/SEC
BH CV ECHO MEAS - LV DIASTOLIC VOL/BSA (35-75): 45.2 ML/M^2
BH CV ECHO MEAS - LV MASS(C)D: 165.5 GRAMS
BH CV ECHO MEAS - LV MASS(C)DI: 79.6 GRAMS/M^2
BH CV ECHO MEAS - LV MAX PG: 2.4 MMHG
BH CV ECHO MEAS - LV MEAN PG: 1 MMHG
BH CV ECHO MEAS - LV SYSTOLIC VOL/BSA (12-30): 34.6 ML/M^2
BH CV ECHO MEAS - LV V1 MAX: 77.6 CM/SEC
BH CV ECHO MEAS - LV V1 MEAN: 52.7 CM/SEC
BH CV ECHO MEAS - LV V1 VTI: 15.2 CM
BH CV ECHO MEAS - LVIDD: 4 CM
BH CV ECHO MEAS - LVIDS: 3.6 CM
BH CV ECHO MEAS - LVLD AP2: 7.7 CM
BH CV ECHO MEAS - LVLD AP4: 7.2 CM
BH CV ECHO MEAS - LVLS AP2: 7.8 CM
BH CV ECHO MEAS - LVLS AP4: 6.9 CM
BH CV ECHO MEAS - LVOT AREA (M): 2.8 CM^2
BH CV ECHO MEAS - LVOT AREA: 2.8 CM^2
BH CV ECHO MEAS - LVOT DIAM: 1.9 CM
BH CV ECHO MEAS - LVPWD: 1.1 CM
BH CV ECHO MEAS - MED PEAK E' VEL: 4 CM/SEC
BH CV ECHO MEAS - MV A DUR: 0.1 SEC
BH CV ECHO MEAS - MV A MAX VEL: 88.3 CM/SEC
BH CV ECHO MEAS - MV DEC SLOPE: 673 CM/SEC^2
BH CV ECHO MEAS - MV DEC TIME: 0.16 SEC
BH CV ECHO MEAS - MV E MAX VEL: 66.9 CM/SEC
BH CV ECHO MEAS - MV E/A: 0.76
BH CV ECHO MEAS - MV MAX PG: 5 MMHG
BH CV ECHO MEAS - MV MEAN PG: 2 MMHG
BH CV ECHO MEAS - MV P1/2T MAX VEL: 76 CM/SEC
BH CV ECHO MEAS - MV P1/2T: 33.1 MSEC
BH CV ECHO MEAS - MV V2 MAX: 112 CM/SEC
BH CV ECHO MEAS - MV V2 MEAN: 64.6 CM/SEC
BH CV ECHO MEAS - MV V2 VTI: 21.1 CM
BH CV ECHO MEAS - MVA P1/2T LCG: 2.9 CM^2
BH CV ECHO MEAS - MVA(P1/2T): 6.7 CM^2
BH CV ECHO MEAS - MVA(VTI): 2 CM^2
BH CV ECHO MEAS - PA ACC TIME: 0.07 SEC
BH CV ECHO MEAS - PA MAX PG (FULL): 0.17 MMHG
BH CV ECHO MEAS - PA MAX PG: 1.5 MMHG
BH CV ECHO MEAS - PA PR(ACCEL): 47.5 MMHG
BH CV ECHO MEAS - PA V2 MAX: 61.1 CM/SEC
BH CV ECHO MEAS - PULM A REVS DUR: 0.09 SEC
BH CV ECHO MEAS - PULM A REVS VEL: 23.2 CM/SEC
BH CV ECHO MEAS - PULM DIAS VEL: 29.5 CM/SEC
BH CV ECHO MEAS - PULM S/D: 1.7
BH CV ECHO MEAS - PULM SYS VEL: 50.6 CM/SEC
BH CV ECHO MEAS - PVA(V,A): 5 CM^2
BH CV ECHO MEAS - PVA(V,D): 5 CM^2
BH CV ECHO MEAS - QP/QS: 1.3
BH CV ECHO MEAS - RV MAX PG: 1.3 MMHG
BH CV ECHO MEAS - RV MEAN PG: 1 MMHG
BH CV ECHO MEAS - RV V1 MAX: 57.6 CM/SEC
BH CV ECHO MEAS - RV V1 MEAN: 39.9 CM/SEC
BH CV ECHO MEAS - RV V1 VTI: 10.2 CM
BH CV ECHO MEAS - RVOT AREA: 5.3 CM^2
BH CV ECHO MEAS - RVOT DIAM: 2.6 CM
BH CV ECHO MEAS - SI(AO): 112.7 ML/M^2
BH CV ECHO MEAS - SI(CUBED): 8.3 ML/M^2
BH CV ECHO MEAS - SI(LVOT): 20.7 ML/M^2
BH CV ECHO MEAS - SI(MOD-SP2): 7.2 ML/M^2
BH CV ECHO MEAS - SI(MOD-SP4): 10.6 ML/M^2
BH CV ECHO MEAS - SI(TEICH): 7.5 ML/M^2
BH CV ECHO MEAS - SUP REN AO DIAM: 1.7 CM
BH CV ECHO MEAS - SV(AO): 234.4 ML
BH CV ECHO MEAS - SV(CUBED): 17.3 ML
BH CV ECHO MEAS - SV(LVOT): 43.1 ML
BH CV ECHO MEAS - SV(MOD-SP2): 15 ML
BH CV ECHO MEAS - SV(MOD-SP4): 22 ML
BH CV ECHO MEAS - SV(RVOT): 54.2 ML
BH CV ECHO MEAS - SV(TEICH): 15.6 ML
BH CV ECHO MEAS - TAPSE (>1.6): 1.7 CM2
BH CV VAS BP RIGHT ARM: NORMAL MMHG
BH CV XLRA - RV BASE: 3.6 CM
BH CV XLRA - TDI S': 11 CM/SEC
C PNEUM DNA NPH QL NAA+NON-PROBE: NOT DETECTED
CHOLEST SERPL-MCNC: 152 MG/DL (ref 0–200)
CK SERPL-CCNC: 141 U/L (ref 20–180)
CRP SERPL-MCNC: 0.1 MG/DL (ref 0–0.5)
E/E' RATIO: 17
ERYTHROCYTE [SEDIMENTATION RATE] IN BLOOD: 6 MM/HR (ref 0–30)
FIBRINOGEN PPP-MCNC: 292 MG/DL (ref 219–464)
FLUAV H1 2009 PAND RNA NPH QL NAA+PROBE: NOT DETECTED
FLUAV H1 HA GENE NPH QL NAA+PROBE: NOT DETECTED
FLUAV H3 RNA NPH QL NAA+PROBE: NOT DETECTED
FLUAV SUBTYP SPEC NAA+PROBE: NOT DETECTED
FLUBV RNA ISLT QL NAA+PROBE: NOT DETECTED
GLUCOSE BLDC GLUCOMTR-MCNC: 105 MG/DL (ref 70–130)
GLUCOSE BLDC GLUCOMTR-MCNC: 128 MG/DL (ref 70–130)
GLUCOSE BLDC GLUCOMTR-MCNC: 158 MG/DL (ref 70–130)
GLUCOSE BLDC GLUCOMTR-MCNC: 187 MG/DL (ref 70–130)
HADV DNA SPEC NAA+PROBE: NOT DETECTED
HBA1C MFR BLD: 6.6 % (ref 4.8–5.6)
HCOV 229E RNA SPEC QL NAA+PROBE: NOT DETECTED
HCOV HKU1 RNA SPEC QL NAA+PROBE: NOT DETECTED
HCOV NL63 RNA SPEC QL NAA+PROBE: NOT DETECTED
HCOV OC43 RNA SPEC QL NAA+PROBE: NOT DETECTED
HDLC SERPL-MCNC: 50 MG/DL (ref 40–60)
HMPV RNA NPH QL NAA+NON-PROBE: NOT DETECTED
HPIV1 RNA SPEC QL NAA+PROBE: NOT DETECTED
HPIV2 RNA SPEC QL NAA+PROBE: NOT DETECTED
HPIV3 RNA NPH QL NAA+PROBE: NOT DETECTED
HPIV4 P GENE NPH QL NAA+PROBE: NOT DETECTED
INR PPP: 2.51 (ref 0.9–1.1)
LDLC SERPL CALC-MCNC: 77 MG/DL (ref 0–100)
LDLC/HDLC SERPL: 1.54 {RATIO}
LEFT ATRIUM VOLUME INDEX: 39 ML/M2
LV EF 2D ECHO EST: 23 %
M PNEUMO IGG SER IA-ACNC: NOT DETECTED
MAXIMAL PREDICTED HEART RATE: 137 BPM
PROTHROMBIN TIME: 26.3 SECONDS (ref 11.7–14.2)
RHINOVIRUS RNA SPEC NAA+PROBE: NOT DETECTED
RSV RNA NPH QL NAA+NON-PROBE: NOT DETECTED
STRESS TARGET HR: 116 BPM
TRIGL SERPL-MCNC: 124 MG/DL (ref 0–150)
VLDLC SERPL-MCNC: 24.8 MG/DL (ref 5–40)

## 2017-12-08 PROCEDURE — 86140 C-REACTIVE PROTEIN: CPT | Performed by: RADIOLOGY

## 2017-12-08 PROCEDURE — 97161 PT EVAL LOW COMPLEX 20 MIN: CPT

## 2017-12-08 PROCEDURE — 97535 SELF CARE MNGMENT TRAINING: CPT

## 2017-12-08 PROCEDURE — 85384 FIBRINOGEN ACTIVITY: CPT | Performed by: RADIOLOGY

## 2017-12-08 PROCEDURE — 25010000002 PERFLUTREN (DEFINITY) 8.476 MG IN SODIUM CHLORIDE 0.9 % 10 ML INJECTION: Performed by: HOSPITALIST

## 2017-12-08 PROCEDURE — G8980 MOBILITY D/C STATUS: HCPCS

## 2017-12-08 PROCEDURE — 80061 LIPID PANEL: CPT | Performed by: HOSPITALIST

## 2017-12-08 PROCEDURE — 63710000001 INSULIN ASPART PER 5 UNITS: Performed by: HOSPITALIST

## 2017-12-08 PROCEDURE — 99223 1ST HOSP IP/OBS HIGH 75: CPT | Performed by: RADIOLOGY

## 2017-12-08 PROCEDURE — 83036 HEMOGLOBIN GLYCOSYLATED A1C: CPT | Performed by: HOSPITALIST

## 2017-12-08 PROCEDURE — G8979 MOBILITY GOAL STATUS: HCPCS

## 2017-12-08 PROCEDURE — 93306 TTE W/DOPPLER COMPLETE: CPT | Performed by: INTERNAL MEDICINE

## 2017-12-08 PROCEDURE — G8978 MOBILITY CURRENT STATUS: HCPCS

## 2017-12-08 PROCEDURE — 97165 OT EVAL LOW COMPLEX 30 MIN: CPT

## 2017-12-08 PROCEDURE — 92610 EVALUATE SWALLOWING FUNCTION: CPT

## 2017-12-08 PROCEDURE — 82962 GLUCOSE BLOOD TEST: CPT

## 2017-12-08 PROCEDURE — 99222 1ST HOSP IP/OBS MODERATE 55: CPT | Performed by: INTERNAL MEDICINE

## 2017-12-08 PROCEDURE — 93880 EXTRACRANIAL BILAT STUDY: CPT

## 2017-12-08 PROCEDURE — 82550 ASSAY OF CK (CPK): CPT | Performed by: HOSPITALIST

## 2017-12-08 PROCEDURE — 93306 TTE W/DOPPLER COMPLETE: CPT

## 2017-12-08 PROCEDURE — 85652 RBC SED RATE AUTOMATED: CPT | Performed by: RADIOLOGY

## 2017-12-08 PROCEDURE — 87798 DETECT AGENT NOS DNA AMP: CPT | Performed by: HOSPITALIST

## 2017-12-08 PROCEDURE — 85610 PROTHROMBIN TIME: CPT | Performed by: HOSPITALIST

## 2017-12-08 PROCEDURE — 87581 M.PNEUMON DNA AMP PROBE: CPT | Performed by: HOSPITALIST

## 2017-12-08 PROCEDURE — 87633 RESP VIRUS 12-25 TARGETS: CPT | Performed by: HOSPITALIST

## 2017-12-08 PROCEDURE — 87486 CHLMYD PNEUM DNA AMP PROBE: CPT | Performed by: HOSPITALIST

## 2017-12-08 RX ORDER — LOSARTAN POTASSIUM 25 MG/1
25 TABLET ORAL
Status: DISCONTINUED | OUTPATIENT
Start: 2017-12-09 | End: 2017-12-10 | Stop reason: HOSPADM

## 2017-12-08 RX ORDER — ROSUVASTATIN CALCIUM 10 MG/1
10 TABLET, COATED ORAL NIGHTLY
Status: DISCONTINUED | OUTPATIENT
Start: 2017-12-08 | End: 2017-12-10 | Stop reason: HOSPADM

## 2017-12-08 RX ORDER — CARVEDILOL 3.12 MG/1
3.12 TABLET ORAL EVERY 12 HOURS SCHEDULED
Status: DISCONTINUED | OUTPATIENT
Start: 2017-12-08 | End: 2017-12-10 | Stop reason: HOSPADM

## 2017-12-08 RX ORDER — CLOPIDOGREL BISULFATE 75 MG/1
75 TABLET ORAL DAILY
Status: DISCONTINUED | OUTPATIENT
Start: 2017-12-08 | End: 2017-12-10

## 2017-12-08 RX ADMIN — FAMOTIDINE 20 MG: 20 TABLET, FILM COATED ORAL at 17:36

## 2017-12-08 RX ADMIN — CETIRIZINE HYDROCHLORIDE 10 MG: 10 TABLET, FILM COATED ORAL at 09:10

## 2017-12-08 RX ADMIN — POTASSIUM CHLORIDE 20 MEQ: 750 CAPSULE, EXTENDED RELEASE ORAL at 09:10

## 2017-12-08 RX ADMIN — INSULIN ASPART 2 UNITS: 100 INJECTION, SOLUTION INTRAVENOUS; SUBCUTANEOUS at 21:05

## 2017-12-08 RX ADMIN — ASPIRIN 325 MG: 325 TABLET ORAL at 09:10

## 2017-12-08 RX ADMIN — PANTOPRAZOLE SODIUM 40 MG: 40 TABLET, DELAYED RELEASE ORAL at 17:36

## 2017-12-08 RX ADMIN — OXAZEPAM 10 MG: 10 CAPSULE, GELATIN COATED ORAL at 21:05

## 2017-12-08 RX ADMIN — PERFLUTREN 2 ML: 6.52 INJECTION, SUSPENSION INTRAVENOUS at 08:18

## 2017-12-08 RX ADMIN — VITAMIN D, TAB 1000IU (100/BT) 1000 UNITS: 25 TAB at 17:37

## 2017-12-08 RX ADMIN — WARFARIN SODIUM 4 MG: 4 TABLET ORAL at 17:36

## 2017-12-08 RX ADMIN — PANTOPRAZOLE SODIUM 40 MG: 40 TABLET, DELAYED RELEASE ORAL at 09:15

## 2017-12-08 RX ADMIN — CARVEDILOL 3.12 MG: 3.12 TABLET, FILM COATED ORAL at 21:05

## 2017-12-08 RX ADMIN — CLOPIDOGREL 75 MG: 75 TABLET, FILM COATED ORAL at 17:36

## 2017-12-08 RX ADMIN — FAMOTIDINE 20 MG: 20 TABLET, FILM COATED ORAL at 09:10

## 2017-12-08 RX ADMIN — GLIPIZIDE 5 MG: 5 TABLET ORAL at 09:10

## 2017-12-08 RX ADMIN — SODIUM CHLORIDE 75 ML/HR: 9 INJECTION, SOLUTION INTRAVENOUS at 12:41

## 2017-12-08 RX ADMIN — ROSUVASTATIN CALCIUM 10 MG: 10 TABLET, FILM COATED ORAL at 21:05

## 2017-12-08 RX ADMIN — INSULIN ASPART 2 UNITS: 100 INJECTION, SOLUTION INTRAVENOUS; SUBCUTANEOUS at 12:10

## 2017-12-08 RX ADMIN — BUMETANIDE 1 MG: 1 TABLET ORAL at 09:10

## 2017-12-08 NOTE — CONSULTS
DOS: 2017  NAME: Caitlyn Longoria   : 1934  PCP: Zenaida Suarez MD  CC: Stroke  Referring MD: Saman Cruz MD    Neurological Problem and Interval History:  83 y.o. RHW female with a Hx of CAD, AF, diabetes mellitus and hypertension.  He was in her usual state of fair health when this past Tuesday at 10:30 AM she suddenly had trouble getting words out.  She said that about 75% of her words were not making any sense.  The symptoms persisted until Thursday when she came to the hospital and have started to improve.  She is not entirely back to normal but is much better.  She denies any other stroke or TIA symptoms.  She states she has been compliant with her medications.  He is independent and active.  She denies headache.    Past Medical/Surgical Hx:  Past Medical History:   Diagnosis Date   • Abdominal pain    • Cough    • Diarrhea    • Difficulty breathing    • Dizziness    • DM type 2 (diabetes mellitus, type 2)      Past Surgical History:   Procedure Laterality Date   • CARDIAC CATHETERIZATION     • CHOLECYSTECTOMY     • CORONARY STENT PLACEMENT     • HERNIA REPAIR     • HYSTERECTOMY     • PACEMAKER IMPLANTATION     • REPLACEMENT TOTAL KNEE         Review of Systems:        A complete review of all systems is negative except as described above plus Arthritis, trouble walking up steps, knee pain.    Medications On Admission  Prescriptions Prior to Admission   Medication Sig Dispense Refill Last Dose   • acetaminophen (TYLENOL) 650 MG 8 hr tablet Take 650 mg by mouth 2 (Two) Times a Day As Needed. PRN   Taking   • Acetylcysteine (NAC PO) Take  by mouth As Needed.   Taking   • azelastine (ASTEPRO) 0.15 % solution nasal spray 2 sprays into each nostril 2 (Two) Times a Day.      • azelastine (OPTIVAR) 0.05 % ophthalmic solution Administer 1 drop to both eyes 2 (Two) Times a Day.   Taking   • baclofen (LIORESAL) 10 MG tablet Take 10 mg by mouth 3 (Three) Times a Day.   Taking   • BIOTIN  MAXIMUM STRENGTH PO Take 1 capsule by mouth Every Morning.   Taking   • bumetanide (BUMEX) 1 MG tablet Take 1 mg by mouth Every Morning.   Taking   • cetirizine (ZyrTEC) 10 MG tablet Take 10 mg by mouth Daily.   Not Taking   • cholecalciferol (VITAMIN D3) 1000 units tablet Take 1,000 Units by mouth Daily With Dinner.      • clotrimazole-betamethasone (LOTRISONE) 1-0.05 % cream Apply  topically 2 (Two) Times a Day.   Taking   • fenofibrate (TRICOR) 145 MG tablet Take 145 mg by mouth Daily.      • flunisolide (NASALIDE) 25 MCG/ACT (0.025%) solution nasal spray Inhale 2 sprays Every 12 (Twelve) Hours.      • glipiZIDE (GLUCOTROL) 5 MG tablet Take 5 mg by mouth Every Morning.   Taking   • Loperamide HCl (IMODIUM PO) Take 1 tablet by mouth Every Morning.   Taking   • Melatonin 3 MG tablet Take 3 mg by mouth Every Night.      • Omega-3 Fatty Acids (FISH OIL) 1000 MG capsule capsule Take 1,000 mg by mouth 2 (Two) Times a Day With Meals.   Taking   • oxazepam (SERAX) 10 MG capsule Take 10 mg by mouth Every Night.   Taking   • pantoprazole (PROTONIX) 40 MG EC tablet Take 40 mg by mouth 2 (Two) Times a Day With Meals.   Taking   • potassium chloride (K-DUR,KLOR-CON) 20 MEQ CR tablet Take 20 mEq by mouth Every Morning.   Taking   • Probiotic Product (ALIGN) 4 MG capsule Take 1 capsule by mouth Every Morning.      • raNITIdine (ZANTAC) 300 MG tablet Take 300 mg by mouth Every Night.   Taking   • warfarin (COUMADIN) 4 MG tablet Take 4 mg by mouth Daily.      • ciprofloxacin (CIPRO) 500 MG tablet Take 500 mg by mouth 2 (Two) Times a Day. Completed 12/5 12/5/2017       Allergies:  Allergies   Allergen Reactions   • Ahist [Chlorpheniramine] Nausea Only, Other (See Comments) and Dizziness     Headache, Blurred vision   • Amoxicillin Other (See Comments)     Yeast infection   • Clarithromycin Nausea Only     Other reaction(s): Headache, Depression, Flushed   • Diclofenac      Voltaren   • Diphenhydramine      Benadryl   •  Esomeprazole      Nexium. Stomach issues   • Ibuprofen Other (See Comments)     Does not recall    • Levalbuterol Swelling     Xopenex   • Levocetirizine Other (See Comments)     Xyzal. Diarrhea, Stomach cramps   • Lipitor [Atorvastatin] Other (See Comments)     Muscle pain and weakness, dark urine   • Lodine [Etodolac]    • Omeprazole Nausea Only     Prilosec. Headache   • Pravachol [Pravastatin] Nausea Only and Other (See Comments)     Bloated, Constipation, Headaches   • Quinapril Swelling and Confusion     Accupril. Headaches, constipation   • Sulfa Antibiotics    • Sulindac      Other reaction(s): Headache, joint pain, bruising   • Valdecoxib Irritability   • Prednisone Rash     Social Hx:  Social History     Social History   • Marital status: Single     Spouse name: N/A   • Number of children: N/A   • Years of education: N/A     Occupational History   • Not on file.     Social History Main Topics   • Smoking status: Never Smoker   • Smokeless tobacco: Never Used   • Alcohol use No   • Drug use: No   • Sexual activity: Not on file     Other Topics Concern   • Not on file     Social History Narrative     Family Hx:  History reviewed. No pertinent family history.    Review of Imaging (Interpretation of images not reports):  CTP: Negative  CT: Negative  CTA: Mild Calcification of the aortic arch, tortuosity of the great vessels, dominant left vertebral artery but the V1 segments are not well visualized due to contrast overlaying the venous system, there is severe tortuosity internal carotid arteries and common carotid arteries, there is bulky plaque of the left internal carotid artery and distal common carotid with an ulceration, there is calcification intracranial internal carotid arteries but no significant stenosis, the left vertebral artery is dominant and has calcification in the V4 segment but does not appear to be associated with significant stenosis    Laboratory Results:   Lab Results   Component Value  "Date    GLUCOSE 172 (H) 12/07/2017    CALCIUM 9.4 12/07/2017     12/07/2017    K 4.2 12/07/2017    CO2 26.7 12/07/2017     12/07/2017    BUN 22 12/07/2017    CREATININE 1.07 (H) 12/07/2017    EGFRIFNONA 49 (L) 12/07/2017    BCR 20.6 12/07/2017    ANIONGAP 13.3 12/07/2017     Lab Results   Component Value Date    WBC 11.42 (H) 12/07/2017    HGB 13.9 12/07/2017    HCT 43.1 12/07/2017    MCV 99.8 (H) 12/07/2017     12/07/2017     Lab Results   Component Value Date    LDLCALC 77 12/08/2017     Lab Results   Component Value Date    HGBA1C 6.60 (H) 12/08/2017     Lab Results   Component Value Date    INR 2.51 (H) 12/08/2017    INR 2.82 (H) 12/07/2017    INR 1.2 10/20/2014    PROTIME 26.3 (H) 12/08/2017    PROTIME 28.8 (H) 12/07/2017    PROTIME 14.7 10/20/2014   TTE: EF 23% moderate left atrial enlargement  EKG: Paced    Physical Examination:  /99 (BP Location: Right arm, Patient Position: Lying)  Pulse 81  Temp 97.8 °F (36.6 °C) (Oral)   Resp 17  Ht 165.1 cm (65\")  Wt 102 kg (225 lb)  SpO2 98%  BMI 37.44 kg/m2  General Appearance:   Well developed, obese, well groomed, alert, and cooperative.  HEENT: Normocephalic.  Normal fundoscopic exam including normal retina, discs are flat with sharp margins, normal vasculature.  Tanvir's sign  Neck and Spine: Normal range of motion.  Normal alignment. No mass or tenderness. No bruits.  Cardiac: Regular rate and rhythm. No murmurs.  Peripheral Vasculature: Radial and pedal pulses are equal and symmetric. No signs of distal embolization.  Extremities:    No edema or deformities. Normal joint ROM.  Skin:    No rashes or birth marks.    Neurological examination:  Higher Integrative  Function: Oriented to time, place and person. Normal registration, recall, attention span and concentration. Normal language including comprehension, spontaneous speech, repetition, reading, writing, naming and vocabulary except for a single paraphasic air. No neglect with " normal visual-spatial function and construction. Normal fund of knowledge and higher integrative function.  CN II: Pupils are equal, round, and reactive to light. Normal visual acuity and visual fields.    CN III IV VI: Extraocular movements are full without nystagmus.   CN V: Normal facial sensation and strength of muscles of mastication.  CN VII: Facial movements are symmetric. No weakness.  CN VIII:   Auditory acuity is normal.  CN IX & X:   Symmetric palatal movement.  CN XI: Sternocleidomastoid and trapezius are normal.  No weakness.  CN XII:   The tongue is midline.  No atrophy or fasciculations.  Motor: Normal muscle strength, bulk and tone in upper and lower extremities.  No fasciculations, rigidity, spasticity, or abnormal movements.  Reflexes: 1+ in the upper and absent lower extremities. Plantar responses are flexor.  Sensation: Normal to light touch, temperature, and proprioception in arms and legs. Normal graphesthesia and no extinction on DSS.  Station and Gait: Normal gait and station.    Coordination: Finger to nose test shows no dysmetria.  Rapid alternating movements are normal.  Heel to shin normal.  NIHSS: 1  Diagnoses / Discussion:  83 y.o. who presents with Sx of aphasia who is essentially normal on examination.  Imaging does not show any evidence of an acute stroke but she cannot have an MRI scan.  She does however have a very large bulky hypodense ulcerated plaque in the left common carotid artery extending into the internal carotid artery.  This is associated with a high risk of embolization.  She is therapeutic on her warfarin which argues against atrial fibrillation being the etiology.  She is not on a statin and may benefit from it although she has had a reaction in the past to atorvastatin.  I don't think the plaque results in a stenosis severity that would warrant revascularization but would like to confirm this with a duplex ultrasound.  If she were to have recurrent events despite the  addition of antiplatelet therapy then she might need carotid revascularization. A HAMLET may be reasonable if she has another event especially if it's an another arterial distribution.    Plan:  Plavix 75mg  Carotid ultrasound  Hydrate  Neurochecks  Crestor 20mg  Non-pharmacological DVT prophylaxis  CT/CTA/CTP (done)  EKG Tele  PT/OT/ST  Stroke Education  Blood pressure control to <130/80  Goal LDL <70-recommend high dose statins-   Serum glucose < 140     Call 911 for stroke any stroke symptoms    I have discussed the above with the patient and family.  Time spent with patient: 60min    MDM  Reviewed: vitals  Reviewed previous: ultrasound  Interpretation: CT scan, labs and ECG      Dictated using Dragon dictation.

## 2017-12-08 NOTE — THERAPY DISCHARGE NOTE
Acute Care - Occupational Therapy Initial Eval/Discharge  [facility]     Patient Name: Caitlyn Longoria  : 1934  MRN: [id number]  Today's Date: 2017               Admit Date: 2017       ICD-10-CM ICD-9-CM   1. Dysarthria R47.1 784.51     Patient Active Problem List   Diagnosis   • Neck and shoulder pain   • Arthropathy of shoulder region   • Carpal tunnel syndrome of left wrist   • Chronic pain of both shoulders   • Chronic left shoulder pain   • Arthropathy of left shoulder   • Dysarthria   • DM type 2 (diabetes mellitus, type 2)   • Atrial fibrillation   • HLD (hyperlipidemia)   • Bronchitis   • CAD (coronary artery disease), hx of stent     Past Medical History:   Diagnosis Date   • Abdominal pain    • Cough    • Diarrhea    • Difficulty breathing    • Dizziness    • DM type 2 (diabetes mellitus, type 2)      Past Surgical History:   Procedure Laterality Date   • CARDIAC CATHETERIZATION     • CHOLECYSTECTOMY     • CORONARY STENT PLACEMENT     • HERNIA REPAIR     • HYSTERECTOMY     • PACEMAKER IMPLANTATION     • REPLACEMENT TOTAL KNEE            OT ASSESSMENT FLOWSHEET (last 72 hours)      OT Evaluation       17 1111 17 1100 17 1017 17 1015 17 1600    Rehab Evaluation    Document Type  evaluation  -HC       Subjective Information  agree to therapy;no complaints  -HC       Patient Effort, Rehab Treatment  good  -HC       Symptoms Noted During/After Treatment  none  -HC       General Information    Patient Profile Review  yes  -HC       General Observations  supine in bed in no acute distress  -HC       Precautions/Limitations  fall precautions  -HC       Prior Level of Function  independent:;ADL's  -HC       Equipment Currently Used at Home  bipap/ cpap;cane, straight;walker, rolling;grab bar  -HC bipap/ cpap  -MS  bipap/ cpap  -EL    Plans/Goals Discussed With  patient  -HC       Risks Reviewed  patient:  -HC       Benefits Reviewed  patient:  -HC       Barriers  to Rehab  none identified  -HC       Living Environment    Lives With  alone  -HC alone  -MS  alone  -EL    Living Arrangements  house  -HC house  -MS  house  -EL    Home Accessibility  bed and bath on same level  -HC bed and bath on same level  -MS  stairs (1 railing present)  -EL    Stair Railings at Home  inside, present on right side;inside, present on left side  -HC   inside, present on right side;inside, present on left side  -EL    Type of Financial/Environmental Concern  none  -HC none  -MS  none  -EL    Transportation Available  car;family or friend will provide  -HC car;family or friend will provide  -MS  family or friend will provide  -    Clinical Impression    Patient/Family Goals Statement  to return home  -       Criteria for Skilled Therapeutic Interventions Met  no  -HC       Anticipated Discharge Disposition home  - home  -       Functional Level Prior    Ambulation    0-->independent  -MS 0-->independent  -EL    Transferring    0-->independent  -MS 0-->independent  -EL    Toileting    0-->independent  -MS 0-->independent  -EL    Bathing    0-->independent  -MS 0-->independent  -EL    Dressing    0-->independent  -MS 0-->independent  -EL    Eating    0-->independent  -MS 0-->independent  -EL    Communication    0-->understands/communicates without difficulty  -MS 0-->understands/communicates without difficulty  -EL    Swallowing    0-->swallows foods/liquids without difficulty  -MS 0-->swallows foods/liquids without difficulty  -EL    Prior Functional Level Comment     none  -EL    Pain Assessment    Pain Assessment  No/denies pain  -HC       Vision Assessment/Intervention    Visual Impairment  WNL  -HC       Cognitive Assessment/Intervention    Current Cognitive/Communication Assessment  functional  -       Orientation Status  oriented x 4  -HC       Follows Commands/Answers Questions  100% of the time  -HC       Personal Safety  WNL/WFL  -HC       Personal Safety Interventions  fall  prevention program maintained;gait belt;nonskid shoes/slippers when out of bed  -HC       ROM (Range of Motion)    General ROM Detail  [facility] WFL  -HC       MMT (Manual Muscle Testing)    General MMT Assessment Detail  [facility] WFL  -HC       Bed Mobility, Assessment/Treatment    Bed Mobility, Assistive Device  bed rails;head of bed elevated  -HC       Bed Mob, Supine to Sit, [place]  supervision required  -HC       Bed Mob, Sit to Supine, [place]  supervision required  -HC       Transfer Assessment/Treatment    Transfers, Bed-Chair [place]  conditional independence  -HC       Transfers, Chair-Bed [place]  not tested  -HC       Transfers, Bed-Chair-Bed, Assist Device  rolling walker  -HC       Transfers, Sit-Stand [place]  conditional independence  -HC       Transfers, Stand-Sit [place]  conditional independence  -HC       Transfers, Sit-Stand-Sit, Assist Device  rolling walker  -       Toilet Transfer, [place]  supervision required  -HC       Toilet Transfer, Assistive Device  elevated toilet seat;other (see comments)   grab bars  -HC       Transfer, Comment  VCs to keep rwx w/patient while transfers into BR and onto toilet, able to demo good carryover  -       Functional Mobility    Functional Mobility- Ind. Level  supervision required  -       Functional Mobility- Device  rolling walker  -       Functional Mobility-Distance (Feet)  --   from bed to BR and then to chair  -       Lower Body Dressing Assessment/Training    LB Dressing Assess/Train, Clothing Type  doffing:;donning:;socks  -       LB Dressing Assess/Train, Position  sitting;edge of bed  -HC       LB Dressing Assess/Train, [place]  conditional independence  -HC       LB Dressing Assess/Train, Comment  Sup for safety in this setting; pt reports that she completes all ADL tasks without assist at home and feels she is at baseline functionally  -HC       Toileting Assessment/Training    Toileting Assess/Train,  Assistive Device  grab bars  -       Toileting Assess/Train, Position  sitting  -       Toileting Assess/Train, Indepen Level  conditional independence  -HC       Grooming Assessment/Training    Grooming Assess/Train, Position  standing;sink side  -HC       Grooming Assess/Train, Indepen Level  conditional independence  -HC       Motor Skills/Interventions    Additional Documentation  Balance Skills Training (Group);Functional Endurance (Group)  -       Functional Endurance    Detail (Functional Endurance)  good  -HC       Positioning and Restraints    Pre-Treatment Position  in bed  -HC       Post Treatment Position  chair  -HC       In Chair  reclined;call light within reach;encouraged to call for assist;exit alarm on;notified nsg;legs elevated  -HC         User Key  (r) = Recorded By, (t) = Taken By, (c) = Cosigned By    Initials Name Effective Dates    MS Jayden Kessler RN 02/18/16 -     ARI Mason, RN 09/06/16 -     HC Cinthia Lee, OTR 08/17/17 -           Occupational Therapy Education     Title: PT OT SLP Therapies (Done)     Topic: Occupational Therapy (Done)     Point: ADL training (Done)    Description: Instruct learner(s) on proper safety adaptation and remediation techniques during self care or transfers.   Instruct in proper use of assistive devices.    Learning Progress Summary    Learner Readiness Method Response Comment Documented by Status   Patient Eager E [facility] 12/08/17 1107 Done               Point: Precautions (Done)    Description: Instruct learner(s) on prescribed precautions during self-care and functional transfers.    Learning Progress Summary    Learner Readiness Method Response Comment Documented by Status   Patient Eager E [facility] 12/08/17 1107 Done               Point: Body mechanics (Done)    Description: Instruct learner(s) on proper positioning and spine alignment during self-care, functional mobility activities and/or exercises.    Learning Progress Summary     Learner Readiness Method Response Comment Documented by Status   Patient Diann BENNETT   12/08/17 1107 Done                      User Key     Initials Effective Dates Name Provider Type [facility] 08/17/17 -  MAEVE Copeland Occupational Therapist OT                OT Recommendation and Plan  Anticipated Discharge Disposition: home  Plan of Care Review  Plan Of Care Reviewed With: patient  Outcome Summary/Follow up Plan: Pt presented to OT eval alert and oriented x4. Pt does not present with any significant functional deficits at this time and reports she feels like she is baseline physically and her work finding difficulty has resolved for the most part. Pt is conditioanlly independent with functional mobility and ADLs. No further skilled OT services warranted at this time. Will sign off, please f/u with any changes.               Outcome Measures       12/08/17 1100          How much help from another is currently needed...    Putting on and taking off regular lower body clothing? 4  -HC      Bathing (including washing, rinsing, and drying) 4  -HC      Toileting (which includes using toilet bed pan or urinal) 4  -HC      Putting on and taking off regular upper body clothing 4  -HC      Taking care of personal grooming (such as brushing teeth) 4  -HC      Eating meals 4  -HC      Score 24  -      Functional Assessment    Outcome Measure Options AM-PAC 6 Clicks Daily Activity (OT)  -HC        User Key  (r) = Recorded By, (t) = Taken By, (c) = Cosigned By    Initials Name Provider Type     MAEVE Copeland Occupational Therapist          Time Calculation:         Time Calculation- OT       12/08/17 1111          Time Calculation- OT    OT Start Time 1030  -HC      OT Stop Time 1055  -HC      OT Time Calculation (min) 25 min  -HC      OT Received On 12/08/17  -        User Key  (r) = Recorded By, (t) = Taken By, (c) = Cosigned By    Initials Name Provider Type    MAEVE Vaca Occupational  Therapist          Therapy Charges for Today     Code Description Service Date Service Provider Modifiers Qty    98745297424  OT EVAL LOW COMPLEXITY 2 12/8/2017 MAEVE Copeland GO 1    [id number]  OT SELF CARE/MGMT/TRAIN EA 15 MIN 12/8/2017 MAEVE Copeland GO 1               OT Discharge Summary  Anticipated Discharge Disposition: home    MAEVE Copeland  12/8/2017

## 2017-12-08 NOTE — PLAN OF CARE
Problem: Patient Care Overview (Adult)  Goal: Plan of Care Review    12/08/17 6867   Coping/Psychosocial Response Interventions   Plan Of Care Reviewed With patient   Outcome Evaluation   Outcome Summary/Follow up Plan Pt presents from home w/speech disturbance, r/o CVA. Upon exam, pt demonstrates no unilateral or focal deficits in strength, sensation, or coordination. Demonstrates good safety awareness and able to ambulate in hallway with rwx and supervision. Pt demonstates no safety concerns or LOB with activity; appears to be at baseline mobility. Pt has rwx in room; appropriate to continue ambulating with [facility] staff during remainder of stay. No further acute PT concerns; will sign off.

## 2017-12-08 NOTE — CONSULTS
Patient Name: Caitlyn Longoria  :1934  83 y.o.    Date of Admission: 2017  Date of Consultation:  17  Encounter Provider: Se Perez III, MD  Place of Service: [facility] CARDIOLOGY  Referring Provider: Saman Cruz MD  Patient Care Team:  Zenaida Suarez MD as PCP - General (Internal Medicine)      Chief complaint:slurred speech,     Consulted for: Ischemic Cardiomyopathy EF <25% on recent ECHO    History of Present Illness:  Ms. Caitlyn Longoria is a 83 year old female patient with a history of CAD s/p LOLA diagonal 2008 re-stented in  (100% occlusion of the mid LAD, severe disease of the large diagonal and insignificant disease of the RCA and circumflex), atrial fibrillation (warfarin), atrial flutter s/p ablation 10/2011, sick sinus syndrome/PPM medtronic  - RV lead revised  (last interrogated 10/18/2017), HTN, GERD, sleep apnea (CPAP), pulmonary HTN, obesity and diabetes. Patient had stress test on 2016  which revealed small area of ischemia in the septal wall. She last saw Dr. Veliz in the office on 10/2017 as a follow-up. The plan was to maintain NSR and continue warfarin.     Patient presented to the ED with the c/o difficulty speaking.  All of a sudden she could hear words in her head and knee which she wanted to say but when she tried to talk the words were not coming out right.  She is that she was confused with numbers and when she would try to say a word sometimes the wrong word would come out.  She did not have any cardiac sensation her symptoms during this time.  Specifically, no chest pain, pressure, tightness, squeezing, or heartburn.  No palpitations or heart racing.  No presyncope or syncope.  No orthopnea or PND.  She has not had any cardiac symptoms in which she is aware.    She had echocardiogram ordered that was performed.  This demonstrated normal left ventricular size and but mildly increased left  hypertrophy, global hypokinesis with an ejection fraction 23% which is a decrease from her prior echocardiogram of December 2016 which showed an ejection fraction of 40%.    Patient states now her ability to talk is back to normal.  She is awaiting a decision from neurology.    Previous Testing:  ECHO 12/8/2017    Stress Test with Pet 12/8/2016      Past Medical History:   Diagnosis Date   • Abdominal pain    • Cough    • Diarrhea    • Difficulty breathing    • Dizziness    • DM type 2 (diabetes mellitus, type 2)        Past Surgical History:   Procedure Laterality Date   • CARDIAC CATHETERIZATION     • CHOLECYSTECTOMY     • CORONARY STENT PLACEMENT     • HERNIA REPAIR     • HYSTERECTOMY     • PACEMAKER IMPLANTATION     • REPLACEMENT TOTAL KNEE           Prior to Admission medications    Medication Sig Start Date End Date Taking? Authorizing Provider   acetaminophen (TYLENOL) 650 MG 8 hr tablet Take 650 mg by mouth 2 (Two) Times a Day As Needed. PRN 10/11/12  Yes Historical Provider, MD   Acetylcysteine (NAC PO) Take  by mouth As Needed.   Yes Historical Provider, MD   azelastine (ASTEPRO) 0.15 % solution nasal spray 2 sprays into each nostril 2 (Two) Times a Day.   Yes Historical Provider, MD   azelastine (OPTIVAR) 0.05 % ophthalmic solution Administer 1 drop to both eyes 2 (Two) Times a Day. 4/14/15  Yes Historical Provider, MD   baclofen (LIORESAL) 10 MG tablet Take 10 mg by mouth 3 (Three) Times a Day. 12/4/17  Yes Historical Provider, MD   BIOTIN MAXIMUM STRENGTH PO Take 1 capsule by mouth Every Morning.   Yes Historical Provider, MD   bumetanide (BUMEX) 1 MG tablet Take 1 mg by mouth Every Morning. 7/31/14  Yes Historical Provider, MD   cetirizine (ZyrTEC) 10 MG tablet Take 10 mg by mouth Daily. 7/31/14  Yes Historical Provider, MD   cholecalciferol (VITAMIN D3) 1000 units tablet Take 1,000 Units by mouth Daily With Dinner.   Yes Historical Provider, MD   clotrimazole-betamethasone (LOTRISONE) 1-0.05 % cream  Apply  topically 2 (Two) Times a Day.   Yes Historical Provider, MD   fenofibrate (TRICOR) 145 MG tablet Take 145 mg by mouth Daily.   Yes Historical Provider, MD   flunisolide (NASALIDE) 25 MCG/ACT (0.025%) solution nasal spray Inhale 2 sprays Every 12 (Twelve) Hours.   Yes Historical Provider, MD   glipiZIDE (GLUCOTROL) 5 MG tablet Take 5 mg by mouth Every Morning. 8/9/16  Yes Historical Provider, MD   Loperamide HCl (IMODIUM PO) Take 1 tablet by mouth Every Morning.   Yes Historical Provider, MD   Melatonin 3 MG tablet Take 3 mg by mouth Every Night.   Yes Historical Provider, MD   Omega-3 Fatty Acids (FISH OIL) 1000 MG capsule capsule Take 1,000 mg by mouth 2 (Two) Times a Day With Meals.   Yes Historical Provider, MD   oxazepam (SERAX) 10 MG capsule Take 10 mg by mouth Every Night. 12/4/14  Yes Historical Provider, MD   pantoprazole (PROTONIX) 40 MG EC tablet Take 40 mg by mouth 2 (Two) Times a Day With Meals. 10/11/12  Yes Historical Provider, MD   potassium chloride (K-DUR,KLOR-CON) 20 MEQ CR tablet Take 20 mEq by mouth Every Morning. 10/11/12  Yes Historical Provider, MD   Probiotic Product (ALIGN) 4 MG capsule Take 1 capsule by mouth Every Morning.   Yes Historical Provider, MD   raNITIdine (ZANTAC) 300 MG tablet Take 300 mg by mouth Every Night.   Yes Historical Provider, MD   warfarin (COUMADIN) 4 MG tablet Take 4 mg by mouth Daily.   Yes Historical Provider, MD   ciprofloxacin (CIPRO) 500 MG tablet Take 500 mg by mouth 2 (Two) Times a Day. Completed 12/5 11/30/17   Historical Provider, MD       Allergies   Allergen Reactions   • Ahist [Chlorpheniramine] Nausea Only, Other (See Comments) and Dizziness     Headache, Blurred vision   • Amoxicillin Other (See Comments)     Yeast infection   • Clarithromycin Nausea Only     Other reaction(s): Headache, Depression, Flushed   • Diclofenac      Voltaren   • Diphenhydramine      Benadryl   • Esomeprazole      Nexium. Stomach issues   • Ibuprofen Other (See  Comments)     Does not recall    • Levalbuterol Swelling     Xopenex   • Levocetirizine Other (See Comments)     Xyzal. Diarrhea, Stomach cramps   • Lipitor [Atorvastatin] Other (See Comments)     Muscle pain and weakness, dark urine   • Lodine [Etodolac]    • Omeprazole Nausea Only     Prilosec. Headache   • Pravachol [Pravastatin] Nausea Only and Other (See Comments)     Bloated, Constipation, Headaches   • Quinapril Swelling and Confusion     Accupril. Headaches, constipation   • Sulfa Antibiotics    • Sulindac      Other reaction(s): Headache, joint pain, bruising   • Valdecoxib Irritability   • Prednisone Rash       Social History     Social History   • Marital status: Single     Spouse name: N/A   • Number of children: N/A   • Years of education: N/A     Social History Main Topics   • Smoking status: Never Smoker   • Smokeless tobacco: Never Used   • Alcohol use No   • Drug use: No   • Sexual activity: Not Asked     Other Topics Concern   • None     Social History Narrative       History reviewed. No pertinent family history.    REVIEW OF SYSTEMS:   All systems reviewed.  Pertinent positives identified in HPI.  All other systems are negative.      Objective:     Vitals:    12/07/17 2130 12/08/17 0641 12/08/17 0910 12/08/17 1350   BP: 122/86 148/92 166/99 121/75   BP Location: Right arm Right arm Right arm Right arm   Patient Position: Lying Lying Lying Sitting   Pulse: 77 80 81 81   Resp: 18 17 17 18   Temp: 98.2 °F (36.8 °C) 98 °F (36.7 °C) 97.8 °F (36.6 °C) 97.7 °F (36.5 °C)   TempSrc: Oral Oral Oral Oral   SpO2: 95% 91% 98% 95%   Weight:       Height:         Body mass index is 37.44 kg/(m^2).    Physical Exam:  General Appearance:    Alert, cooperative, in no acute distress   Head:    Normocephalic, without obvious abnormality, atraumatic   Eyes:            Lids and lashes normal, conjunctivae and sclerae normal, no   icterus, no pallor, corneas clear, PERRLA   Ears:    Ears appear intact with no  abnormalities noted   Throat:   No oral lesions, no thrush, oral mucosa moist   Neck:   No adenopathy, supple, trachea midline, no thyromegaly, no   carotid bruit, no JVD   Back:     No kyphosis present, no scoliosis present, no skin lesions, erythema or scars, no tenderness to percussion or palpation, range of motion normal   Lungs:     Clear to auscultation,respirations regular, even and unlabored    Heart:    Regular rhythm and normal rate, normal S1 and S2, no murmur, no gallop, no rub, no click   Chest Wall:    No abnormalities observed   Abdomen:     Normal bowel sounds, no masses, no organomegaly, soft        non-tender, non-distended, no guarding, no rebound  tenderness   Extremities:   Moves all extremities well, no edema, no cyanosis, no redness   Pulses:   Pulses palpable and equal bilaterally. Normal radial, carotid, femoral, dorsalis pedis and posterior tibial pulses bilaterally. Normal abdominal aorta   Skin:  Psychiatric:   No bleeding, bruising or rash    Alert and oriented x 3, normal mood and affect         Lab Review:       Results from last 7 days  Lab Units 12/07/17  1206   SODIUM mmol/L 144   POTASSIUM mmol/L 4.2   CHLORIDE mmol/L 104   CO2 mmol/L 26.7   BUN mg/dL 22   CREATININE mg/dL 1.07*   CALCIUM mg/dL 9.4   BILIRUBIN mg/dL 0.6   ALK PHOS U/L 115   ALT (SGPT) U/L 21   AST (SGOT) U/L 29   GLUCOSE mg/dL 172*       Results from last 7 days  Lab Units 12/08/17  0500   CK TOTAL U/L 141       Results from last 7 days  Lab Units 12/07/17  1206   WBC 10*3/mm3 11.42*   HEMOGLOBIN g/dL 13.9   HEMATOCRIT % 43.1   PLATELETS 10*3/mm3 195       Results from last 7 days  Lab Units 12/08/17  0500 12/07/17  1206   INR  2.51* 2.82*           Results from last 7 days  Lab Units 12/08/17  0500   CHOLESTEROL mg/dL 152   TRIGLYCERIDES mg/dL 124   HDL CHOL mg/dL 50              EKG 12/7/2017      EKG 10/18/2017    I personally viewed and interpreted the patient's EKG/Telemetry data.        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     1.  Dysarthria-neurologic evaluation underway  2.  Chronic atrial fibrillation-rate controlled  3.  Chronic anti-coagulation-patient is been therapeutic with an INR between 2.4 and 3.0 since August with checks at least one time a month  4.  Cardiomyopathy-patient demonstrates a decrease in her ejection fraction.  We will adjust her medical regimen for guideline directed medical therapy.  Given the acute neurologic event, I would not plan on any further investigation at this time.    Se Perez III, MD  12/08/17  4:48 PM

## 2017-12-08 NOTE — PROGRESS NOTES
Discharge Planning Assessment  [facility]     Patient Name: Caitlyn Longoria  MRN: [id number]  Today's Date: 12/8/2017    Admit Date: 12/7/2017          Discharge Needs Assessment       12/08/17 1017    Living Environment    Lives With alone    Living Arrangements house    Home Accessibility bed and bath on same level    Type of Financial/Environmental Concern none    Transportation Available car;family or friend will provide    Living Environment    Provides Primary Care For no one    Quality Of Family Relationships supportive;helpful    Able to Return to Prior Living Arrangements yes    Discharge Needs Assessment    Concerns To Be Addressed no discharge needs identified;denies needs/concerns at this time    Equipment Currently Used at Home bipap/ cpap    Equipment Needed After Discharge none    Discharge Disposition home or self-care            Discharge Plan       12/08/17 1017    Case Management/Social Work Plan    Plan Plan is to return home- has a niece to assist if needed.     Patient/Family In Agreement With Plan yes    Additional Comments Met w/ patient in room.  Introduced self and explained role.  Facesheet verified/ IMM checked.  Patient symtoms have resolved, awaiting neuro to see.  Niece- Radha is involved as well as her brother/sister.          Discharge Placement     No information found        Expected Discharge Date and Time     Expected Discharge Date Expected Discharge Time    Dec 8, 2017               Demographic Summary       12/08/17 1014    Referral Information    Arrived From still a patient    Referral Source hospital clinician/department (specify)    Reason For Consult care coordination/care conference;discharge planning    Contact Information    Permission Granted to Share Information With family/designee            Functional Status       12/08/17 1015    Functional Status Current    Ambulation 2-->assistive person    Transferring 2-->assistive person    Toileting 2-->assistive person     Bathing 2-->assistive person    Dressing 2-->assistive person    Eating 0-->independent    Communication 0-->understands/communicates without difficulty    Swallowing (if score 2 or more for any item, consult Rehab Services) 0-->swallows foods/liquids without difficulty    Functional Status Prior    Ambulation 0-->independent    Transferring 0-->independent    Toileting 0-->independent    Bathing 0-->independent    Dressing 0-->independent    Eating 0-->independent    Communication 0-->understands/communicates without difficulty    Swallowing 0-->swallows foods/liquids without difficulty    IADL    Medications independent    Meal Preparation independent    Housekeeping independent    Laundry independent    Shopping independent    Oral Care independent    Activity Tolerance    Current Activity Limitations none    Usual Activity Tolerance good    Current Activity Tolerance good    Cognitive/Perceptual/Developmental    Current Mental Status/Cognitive Functioning no deficits noted                Jayden Kessler RN

## 2017-12-08 NOTE — PLAN OF CARE
Problem: Patient Care Overview (Adult)  Goal: Plan of Care Review  Outcome: Ongoing (interventions implemented as appropriate)    12/08/17 1801   Coping/Psychosocial Response Interventions   Plan Of Care Reviewed With patient   Patient Care Overview   Progress progress toward functional goals is gradual   Outcome Evaluation   Outcome Summary/Follow up Plan Pt started on plavix. denies pain. alert and oriented. cardio adjust meds.          Problem: Stroke (Ischemic) (Adult)  Goal: Signs and Symptoms of Listed Potential Problems Will be Absent or Manageable (Stroke)  Outcome: Ongoing (interventions implemented as appropriate)

## 2017-12-08 NOTE — PLAN OF CARE
Problem: Patient Care Overview (Adult)  Goal: Plan of Care Review  Outcome: Outcome(s) achieved Date Met:  12/08/17 12/08/17 6813   Coping/Psychosocial Response Interventions   Plan Of Care Reviewed With patient   Outcome Evaluation   Outcome Summary/Follow up Plan Pt presented to OT eval alert and oriented x4. Pt does not present with any significant functional deficits at this time and reports she feels like she is baseline physically and her work finding difficulty has resolved for the most part. Pt is conditioanlly independent with functional mobility and ADLs only needing VCs for safety with rwx in this setting. No further skilled OT services warranted at this time. Recommending d/c home when medically appropriate. Will sign off, please f/u with any changes.

## 2017-12-08 NOTE — SIGNIFICANT NOTE
12/08/17 1128   Rehab Treatment   Discipline physical therapist   Rehab Evaluation   Evaluation Not Performed patient unavailable for evaluation  (Pt with SLP; will follow up later today. )   Recommendation   PT - Next Appointment 12/08/17

## 2017-12-08 NOTE — THERAPY EVALUATION
"Acute Care - Speech Language Pathology   Swallow Initial Evaluation [facility]     Patient Name: Caitlyn Longoria  : 1934  MRN: [id number]  Today's Date: 2017               Admit Date: 2017    SPEECH-LANGUAGE PATHOLOGY EVALUATION - SWALLOW  Subjective: The patient was seen on this date for a Clinical Swallow evaluation.  Patient was alert and cooperative.  Significant history: pt c/o expressive aphasia. Pt reported, \"not back to 100%, but better\", but \"didn't lose any words today\". No anomia in conversation. Pt with hx of esophageal dysphagia with reflux and bronchitis per chart review. VFSS completed 14 revealed no penetration/aspiration with thins, but prolonged mastication. Upper GI completed 17 revealed penetration without aspiration, spastic dysmotility, hiatal hernia, and retained gastric food at 9.5 hours. Pt denies hx of pna. Pt reported coughing \"strongly 4 times with dinner\" every night. Pt suspects 2/2 poor mastication.   Objective: Textures given included ice, thin liquid, puree consistency, mechanical soft consistency and regular consistency.  Assessment: Difficulties were noted with mechanical soft consistency.  Observations: Pt with baseline wet voice with throat clear x2 during session.   throat clear and wet vocal quality x1 only with mixed  SLP Findings:  Patient presents with mild oropharyngeal dysphagia, with esophageal component.   Recommendations: Diet Textures: thin liquid, regular (no mixed) food.  Medications should be taken whole with puree.   Recommended Strategies: Upright for PO. Oral care before breakfast, after all meals and PRN.  Other Recommended Evaluations: VFSS - in outpatient setting d/t chronic issue  Dysphagia therapy is recommended. Rationale: pending results from VFSS.        Visit Dx:     ICD-10-CM ICD-9-CM   1. Dysarthria R47.1 784.51     Patient Active Problem List   Diagnosis   • Neck and shoulder pain   • Arthropathy of shoulder region   • " "Carpal tunnel syndrome of left wrist   • Chronic pain of both shoulders   • Chronic left shoulder pain   • Arthropathy of left shoulder   • Dysarthria   • DM type 2 (diabetes mellitus, type 2)   • Atrial fibrillation   • HLD (hyperlipidemia)   • Bronchitis   • CAD (coronary artery disease), hx of stent     Past Medical History:   Diagnosis Date   • Abdominal pain    • Cough    • Diarrhea    • Difficulty breathing    • Dizziness    • DM type 2 (diabetes mellitus, type 2)      Past Surgical History:   Procedure Laterality Date   • CARDIAC CATHETERIZATION     • CHOLECYSTECTOMY     • CORONARY STENT PLACEMENT     • HERNIA REPAIR     • HYSTERECTOMY     • PACEMAKER IMPLANTATION     • REPLACEMENT TOTAL KNEE            SWALLOW EVALUATION (last 72 hours)      Swallow Evaluation       12/08/17 1134                Rehab Evaluation    Document Type evaluation  -        Subjective Information no complaints;agree to therapy  -        Patient Effort, Rehab Treatment good  -        Symptoms Noted During/After Treatment none  -        General Information    Patient Profile Review yes  -        Subjective Patient Observations Pt c/o anomia, \"not back to 100%\"  -        Pertinent History Of Current Problem expressive aphasia complaints  -        Current Diet Limitations thin liquids;regular solid  -        Prior Level of Function- Swallowing esophageal concerns  -        Plans/Goals Discussed With patient  -        Clinical Impression    Patient's Goals For Discharge patient did not state  -        SLP Swallowing Diagnosis mild dysphagia  -        Rehab Potential/Prognosis, Swallowing good, to achieve stated therapy goals  -        Criteria for Skilled Therapeutic Interventions Met skilled criteria for dysphagia intervention met  -        FCM, Swallowing 6-->Level 6  -        Therapy Frequency PRN  -        Predicted Duration Therapy Interv (days) until discharge  -        Expected Duration Therapy " Session (min) 15-30 minutes  [facility]        SLP Diet Recommendation regular textures;thin liquids;other (see comments)   no mixed  -        Recommended Diagnostics VFSS ([facility]  -        Recommended Feeding/Eating Techniques maintain upright posture during/after eating for 30 mins  [facility]        SLP Rec. for Method of Medication Administration meds whole with thin liquid;meds whole in pudding/applesauce  [facility]        Monitor For Signs Of Aspiration cough;elevated WBC count;gurgly voice;throat clearing;fever;upper respiratory infection;pneumonia;right lower lobe infiltrates  -        Anticipated Discharge Disposition home  -        Pain Assessment    Pain Assessment No/denies pain  -        Oral Motor Structure and Function    Oral Motor Anatomy and Physiology patient demonstrates anatomy and physiology that is WNL  -        Dysphagia Treatment Objectives and Progress    Dysphagia Treatment Objectives Other 1  -        Dysphagia Other 1    Dysphagia Other 1 Objective Pt will participate in [facility]  -          User Key  (r) = Recorded By, (t) = Taken By, (c) = Cosigned By    Initials Name Effective Dates     Moira Vergara MS [facility]-SLP 07/13/17 -         EDUCATION  The patient has been educated in the following areas:   Dysphagia (Swallowing Impairment) Modified Diet Instruction.    SLP Recommendation and Plan  SLP Swallowing Diagnosis: mild dysphagia  SLP Diet Recommendation: regular textures, thin liquids, other (see comments) (no mixed)  Recommended Feeding/Eating Techniques: maintain upright posture during/after eating for 30 mins  SLP Rec. for Method of Medication Administration: meds whole with thin liquid, meds whole in pudding/applesauce  Monitor For Signs Of Aspiration: cough, elevated WBC count, gurgly voice, throat clearing, fever, upper respiratory infection, pneumonia, right lower lobe infiltrates  Recommended Diagnostics: VFSS ([facility])  Criteria for Skilled Therapeutic Interventions Met: skilled criteria  for dysphagia intervention met  Anticipated Discharge Disposition: home  Rehab Potential/Prognosis, Swallowing: good, to achieve stated therapy goals  Therapy Frequency: PRN             Plan of Care Review  Plan Of Care Reviewed With: patient  Progress: improving  Outcome Summary/Follow up Plan: Bedside swallow completed. Pt c/o dysphagia at night with solids. Hx of esophageal dysphagia, throat clearing on secretions at times and x1 with mixed. SLP recs outpatient VFSS.           IP SLP Goals       12/08/17 1136          Safely Consume Diet    Safely Consume Diet- SLP, Date Established 12/08/17  -      Safely Consume Diet- SLP, Time to Achieve by discharge  -        User Key  (r) = Recorded By, (t) = Taken By, (c) = Cosigned By    Initials Name Provider Type     Moira Vergara MS CCC-SLP Speech and Language Pathologist             SLP Outcome Measures (last 72 hours)      SLP Outcome Measures       12/08/17 1100          SLP Outcome Measures    Outcome Measure Used? Adult NOMS  -      FCM Scores    FCM Chosen Swallowing  -      Swallowing FCM Score 6  -        User Key  (r) = Recorded By, (t) = Taken By, (c) = Cosigned By    Initials Name Effective Dates     Moira Vergara MS CCC-SLP 07/13/17 -            Time Calculation:         Time Calculation- SLP       12/08/17 1137          Time Calculation- [facility]    SLP Start Time 1030  -      SLP Stop Time 1130  -      SLP Time Calculation (min) 60 min  -      SLP Received On 12/08/17  -        User Key  (r) = Recorded By, (t) = Taken By, (c) = Cosigned By    Initials Name Provider Type     Moira Vergara MS CCC-SLP Speech and Language Pathologist          Therapy Charges for Today     Code Description Service Date Service Provider Modifiers Qty    [id number] HC ST EVAL ORAL PHARYNG SWALLOW 4 12/8/2017 Moira Vergara MS CCC-SLP GN 1               MS LIZET Melgoza  12/8/2017

## 2017-12-08 NOTE — THERAPY DISCHARGE NOTE
Acute Care - Physical Therapy Initial Eval/Discharge  [facility]     Patient Name: Caitlyn Longoria  : 1934  MRN: [id number]  Today's Date: 2017   Onset of Illness/Injury or Date of Surgery Date: 17     Referring Physician: Nancy      Admit Date: 2017    Visit Dx:    ICD-10-CM ICD-9-CM   1. Dysarthria R47.1 784.51     Patient Active Problem List   Diagnosis   • Neck and shoulder pain   • Arthropathy of shoulder region   • Carpal tunnel syndrome of left wrist   • Chronic pain of both shoulders   • Chronic left shoulder pain   • Arthropathy of left shoulder   • Dysarthria   • DM type 2 (diabetes mellitus, type 2)   • Atrial fibrillation   • HLD (hyperlipidemia)   • Bronchitis   • CAD (coronary artery disease), hx of stent     Past Medical History:   Diagnosis Date   • Abdominal pain    • Cough    • Diarrhea    • Difficulty breathing    • Dizziness    • DM type 2 (diabetes mellitus, type 2)      Past Surgical History:   Procedure Laterality Date   • CARDIAC CATHETERIZATION     • CHOLECYSTECTOMY     • CORONARY STENT PLACEMENT     • HERNIA REPAIR     • HYSTERECTOMY     • PACEMAKER IMPLANTATION     • REPLACEMENT TOTAL KNEE            PT ASSESSMENT (last 72 hours)      PT Evaluation       17 1600 17 1134    Rehab Evaluation    Document Type evaluation;discharge summary  -EE evaluation  -SH    Subjective Information agree to therapy;no complaints  -EE no complaints;agree to therapy  -SH    Patient Effort, Rehab Treatment good  -EE good  -SH    Symptoms Noted During/After Treatment none  -EE none  -SH    General Information    Onset of Illness/Injury or Date of Surgery Date 17  -EE     Referring Physician Nancy  -EE     General Observations Pt sitting up in chair in no acute distress  -EE     Pertinent History Of Current Problem admitted w/slurred speech, r/o CVA  -EE     Precautions/Limitations fall precautions  -EE     Prior Level of Function independent:;all household  mobility;community mobility  -EE     Equipment Currently Used at Home cane, straight;walker, rolling   uses wx in home, cane in community  -EE     Plans/Goals Discussed With patient;agreed upon  -EE     Barriers to Rehab none identified  -EE     Living Environment    Lives With alone  -EE     Living Arrangements house  -EE     Home Accessibility stairs to enter home  -EE     Number of Stairs to Enter Home 4  -EE     Stair Railings at Home outside, present at both sides  -EE     Clinical Impression    Patient/Family Goals Statement Go home  -EE     Criteria for Skilled Therapeutic Interventions Met no problems identified which require skilled intervention;current level of function same as previous level of function  -EE     Vital Signs    Pre SpO2 (%) 97  -EE     O2 Delivery Pre Treatment room air  -EE     O2 Delivery Intra Treatment room air  -EE     Post SpO2 (%) 97  -EE     O2 Delivery Post Treatment room air  -EE     Pre Patient Position Sitting  -EE     Intra Patient Position Standing  -EE     Post Patient Position Sitting  -EE     Pain Assessment    Pain Assessment No/denies pain  -EE No/denies pain  -SH    Cognitive Assessment/Intervention    Current Cognitive/Communication Assessment functional  -EE     Orientation Status oriented x 4  -EE     Follows Commands/Answers Questions 100% of the time  -EE     Personal Safety WNL/WFL  -EE     Personal Safety Interventions fall prevention program maintained;gait belt;nonskid shoes/slippers when out of bed;supervised activity  -EE     ROM (Range of Motion)    General ROM no range of motion deficits identified  -EE     General ROM Detail B LEs grossly WFL  -EE     MMT (Manual Muscle Testing)    General MMT Assessment no strength deficits identified  -EE     General MMT Assessment Detail B LEs grossly WFL for age  -EE     Bed Mobility, Assessment/Treatment    Bed Mob, Supine to Sit, [place] not tested  -EE     Bed Mob, Sit to Supine, [place] not tested  -EE      Bed Mobility, Comment up in chair  -EE     Transfer Assessment/Treatment    Transfers, Sit-Stand [place] supervision required  -EE     Transfers, Stand-Sit [place] supervision required  -EE     Transfers, Sit-Stand-Sit, Assist Device rolling walker  -EE     Gait Assessment/Treatment    Gait, [place] Level supervision required  -EE     Gait, Assistive Device rolling walker  -EE     Gait, Distance (Feet) 350  -EE     Gait, Gait Deviations forward flexed posture;jeremi decreased  -EE     Gait, Comment no LOB or safety concerns. Mild SOA near end of ambulation, improved with seated rest.  -EE     Stairs Assessment/Treatment    Stairs, Comment Pt denies difficulty with stairs prior to admission, states she uses handrails for safety. Denies concern with navigating stairs upon DC home.  -EE     Motor Skills/Interventions    Additional Documentation Balance Skills Training (Group);Gross Motor Coordination Training (Group)  -EE     Balance Skills Training    Sitting-Level of Assistance Independent  -EE     Standing-Level of Assistance Independent  -EE     Static Standing Balance Support assistive device  -EE     Gait Balance-Level of Assistance Distant supervision  -EE     Gait Balance Support assistive device  -EE     Gross Motor Coordination Training    Gross Motor Skill, Impairments Detail B LE coordination grossly intact  -EE     Positioning and Restraints    Pre-Treatment Position sitting in chair/recliner  -EE     Post Treatment Position chair  -EE     In Chair reclined;call light within reach;encouraged to call for assist;notified nsg;exit alarm on;legs elevated   SCD pumps applied B LEs  -EE       12/08/17 1128 12/08/17 1100    Rehab Evaluation    Document Type  evaluation  -HC    Subjective Information  agree to therapy;no complaints  -HC    Evaluation Not Performed patient unavailable for evaluation   Pt with SLP; will follow up later today.   -EE     Patient Effort, Rehab Treatment  good  -HC     Symptoms Noted During/After Treatment  none  -HC    General Information    Patient Profile Review  yes  -HC    General Observations  supine in bed in no acute distress  -    Precautions/Limitations  fall precautions  -HC    Prior Level of Function  independent:;ADL's  -HC    Equipment Currently Used at Home  bipap/ cpap;cane, straight;walker, rolling;grab bar  -    Plans/Goals Discussed With  patient  -HC    Risks Reviewed  patient:  -HC    Benefits Reviewed  patient:  -HC    Barriers to Rehab  none identified  -HC    Living Environment    Lives With  alone  -    Living Arrangements  house  -HC    Home Accessibility  bed and bath on same level  -HC    Stair Railings at Home  inside, present on right side;inside, present on left side  -    Type of Financial/Environmental Concern  none  -    Transportation Available  car;family or friend will provide  -    Pain Assessment    Pain Assessment  No/denies pain  -    Vision Assessment/Intervention    Visual Impairment  WNL  -    Cognitive Assessment/Intervention    Current Cognitive/Communication Assessment  functional  -    Orientation Status  oriented x 4  -HC    Follows Commands/Answers Questions  100% of the time  -    Personal Safety  WNL/WFL  -    Personal Safety Interventions  fall prevention program maintained;gait belt;nonskid shoes/slippers when out of bed  -    ROM (Range of Motion)    General ROM Detail  [facility]  -    MMT (Manual Muscle Testing)    General MMT Assessment Detail  [facility]  -    Bed Mobility, Assessment/Treatment    Bed Mobility, Assistive Device  bed rails;head of bed elevated  -    Bed Mob, Supine to Sit, [place]  supervision required  -    Bed Mob, Sit to Supine, [place]  supervision required  -    Transfer Assessment/Treatment    Transfers, Bed-Chair [place]  conditional independence  -    Transfers, Chair-Bed [place]  not tested  -    Transfers, Bed-Chair-Bed, Assist Device  rolling  walker  -HC    Transfers, Sit-Stand [place]  conditional independence  -HC    Transfers, Stand-Sit [place]  conditional independence  -HC    Transfers, Sit-Stand-Sit, Assist Device  rolling walker  -HC    Toilet Transfer, [place]  supervision required  -HC    Toilet Transfer, Assistive Device  elevated toilet seat;other (see comments)   grab bars  -HC    Transfer, Comment  VCs to keep rwx w/patient while transfers into BR and onto toilet, able to demo good carryover  -HC    Motor Skills/Interventions    Additional Documentation  Balance Skills Training (Group);Functional Endurance (Group)  -HC    Functional Endurance    Detail (Functional Endurance)  good  -HC    Positioning and Restraints    Pre-Treatment Position  in bed  -HC    Post Treatment Position  chair  -HC    In Chair  reclined;call light within reach;encouraged to call for assist;exit alarm on;notified nsg;legs elevated  -HC      12/08/17 1017 12/07/17 1600    General Information    Equipment Currently Used at Home bipap/ cpap  -MS bipap/ cpap  -EL    Living Environment    Lives With alone  -MS alone  -EL    Living Arrangements house  -MS house  -EL    Home Accessibility bed and bath on same level  -MS stairs (1 railing present)  -EL    Stair Railings at Home  inside, present on right side;inside, present on left side  -EL    Type of Financial/Environmental Concern none  -MS none  -EL    Transportation Available car;family or friend will provide  -MS family or friend will provide  -EL      User Key  (r) = Recorded By, (t) = Taken By, (c) = Cosigned By    Initials Name Provider Type    HEATHER Weaver, PT Physical Therapist    NERI Vergara MS CCC-SLP Speech and Language Pathologist    MS Jayden Kessler, RN Case Manager    ARI Mason, RN Registered Nurse    OMAR Lee OTR Occupational Therapist          Physical Therapy Education     Title: PT OT SLP Therapies (Done)     Topic: Physical Therapy (Resolved)     Point:  Mobility training (Resolved)    Learning Progress Summary    Learner Readiness Method Response Comment Documented by Status   Patient Acceptance STEFANIE SOOD   12/08/17 1625 Done                      User Key     Initials Effective Dates Name Provider Type Discipline     12/01/15 -  Patricia Weaver, PT Physical Therapist PT                PT Recommendation and Plan  Anticipated Discharge Disposition: home  PT Frequency: evaluation only  Plan of Care Review  Plan Of Care Reviewed With: patient  Outcome Summary/Follow up Plan: Pt presents from home w/speech disturbance, r/o CVA. Upon exam, pt demonstrates no unilateral or focal deficits in strength, sensation, or coordination. Demonstrates good safety awareness and able to ambulate in hallway with rwx and supervision. Pt demonstates no safety concerns or LOB with activity; appears to be at baseline mobility. Pt has rwx in room; appropriate to continue ambulating with [facility] staff during remainder of stay. No further acute PT concerns; will sign off.               Outcome Measures       12/08/17 1600 12/08/17 1100       How much help from another person do you currently need...    Turning from your back to your side while in flat bed without using bedrails? 4  -EE      Moving from lying on back to sitting on the side of a flat bed without bedrails? 4  -EE      Moving to and from a bed to a chair (including a wheelchair)? 4  -EE      Standing up from a chair using your arms (e.g., wheelchair, bedside chair)? 4  -EE      Climbing 3-5 steps with a railing? 4  -EE      To walk in hospital room? 4  -EE      AM-PAC 6 Clicks Score 24  -EE      How much help from another is currently needed...    Putting on and taking off regular lower body clothing?  4  -HC     Bathing (including washing, rinsing, and drying)  4  -HC     Toileting (which includes using toilet bed pan or urinal)  4  -HC     Putting on and taking off regular upper body clothing  4  -HC     Taking care of personal  grooming (such as brushing teeth)  4  -HC     Eating meals  4  -HC     Score  24  -HC     Functional Assessment    Outcome Measure Options AM-PAC 6 Clicks Basic Mobility (PT)  -EE AM-PAC 6 Clicks Daily Activity (OT)  -HC       User Key  (r) = Recorded By, (t) = Taken By, (c) = Cosigned By    Initials Name Provider Type    EE Patricia Weaver PT Physical Therapist    HC Cinthia Lee OTR Occupational Therapist           Time Calculation:         PT Charges       12/08/17 1621 12/08/17 1128       Time Calculation    Start Time 1600  -EE      Stop Time 1620  -EE      Time Calculation (min) 20 min  -EE      PT Received On 12/08/17  -EE      PT - Next Appointment  12/08/17  -EE       User Key  (r) = Recorded By, (t) = Taken By, (c) = Cosigned By    Initials Name Provider Type    EE Patricia Weaver, PT Physical Therapist          Therapy Charges for Today     Code Description Service Date Service Provider Modifiers Qty    09216086291 HC PT MOBILITY CURRENT 12/8/2017 Patricia Weaver, PT GP, CH 1    78493883989 HC PT MOBILITY PROJECTED 12/8/2017 Patricia Weaver, PT GP, CH 1    [id number] HC PT MOBILITY DISCHARGE 12/8/2017 Patricia Weaver, PT GP, [place] 1    13961131796 HC PT EVAL LOW COMPLEXITY 1 12/8/2017 Patricia Weaver, PT GP 1          PT G-Codes  Outcome Measure Options: AM-PAC 6 Clicks Basic Mobility (PT)  Functional Limitation: Mobility: Walking and moving around  Mobility: Walking and Moving Around Current Status (): 0 percent impaired, limited or restricted  Mobility: Walking and Moving Around Goal Status (): 0 percent impaired, limited or restricted  Mobility: Walking and Moving Around Discharge Status (): 0 percent impaired, limited or restricted    PT Discharge Summary  Anticipated Discharge Disposition: home  Reason for Discharge: Independent, At baseline function    Patricia Weaver PT  12/8/2017

## 2017-12-08 NOTE — PLAN OF CARE
Problem: Patient Care Overview (Adult)  Goal: Plan of Care Review    12/08/17 1136   Coping/Psychosocial Response Interventions   Plan Of Care Reviewed With patient   Patient Care Overview   Progress improving   Outcome Evaluation   Outcome Summary/Follow up Plan Bedside swallow completed. Pt c/o dysphagia at night with solids. Hx of esophageal dysphagia, throat clearing on secretions at times and x1 with mixed. SLP recs outpatient [facility].          Problem: Inpatient SLP  Goal: Dysphagia- Patient will safely consume diet as per recommendation with no signs/symptoms of aspiration    12/08/17 1136   Safely Consume Diet   Safely Consume Diet- SLP, Date Established 12/08/17   Safely Consume Diet- SLP, Time to Achieve by discharge

## 2017-12-08 NOTE — PROGRESS NOTES
"DAILY PROGRESS NOTE  [facility]    Patient Identification:  Name: Caitlyn Longoria  Age: 83 y.o.  Sex: female  :  1934  MRN: [id number]         Primary Care Physician: Zenaida Suarez MD    Subjective:  Interval History:Her speech is better today.    Objective:    Scheduled Meds:  aspirin 325 mg Oral Daily   Or      aspirin 300 mg Rectal Daily   atorvastatin 80 mg Oral Nightly   bumetanide 1 mg Oral QAM   cetirizine 10 mg Oral Daily   cholecalciferol 1,000 Units Oral Daily With Dinner   famotidine 20 mg Oral BID   glipiZIDE 5 mg Oral QAM   insulin aspart 0-7 Units Subcutaneous 4x Daily With Meals & Nightly   oxazepam 10 mg Oral Nightly   pantoprazole 40 mg Oral BID With Meals   potassium chloride 20 mEq Oral Daily   warfarin 4 mg Oral Daily     Continuous Infusions:  sodium chloride 75 mL/hr Last Rate: 75 mL/hr (17 2240)       Vital signs in last 24 hours:  Temp:  [97.4 °F (36.3 °C)-98.4 °F (36.9 °C)] 97.8 °F (36.6 °C)  Heart Rate:  [74-81] 81  Resp:  [16-18] 17  BP: (122-171)/() 166/99    Intake/Output:    Intake/Output Summary (Last 24 hours) at 17 1143  Last data filed at 17 0500   Gross per 24 hour   Intake              468 ml   Output                0 ml   Net              468 ml       Exam:  /99 (BP Location: Right arm, Patient Position: Lying)  Pulse 81  Temp 97.8 °F (36.6 °C) (Oral)   Resp 17  Ht 165.1 cm (65\")  Wt 102 kg (225 lb)  SpO2 98%  BMI 37.44 kg/m2    General Appearance:    Alert, cooperative, no distress   Head:    Normocephalic, without obvious abnormality, atraumatic   Eyes:       Throat:   Lips, tongue, gums normal   Neck:   Supple, symmetrical, trachea midline, no JVD   Lungs:     Clear to auscultation bilaterally, respirations unlabored   Chest Wall:    No tenderness or deformity    Heart:    Regular rate and rhythm, S1 and S2 normal, no murmur,no  Rub or gallop   Abdomen:     Soft, non-tender, bowel sounds active, no " masses, no organomegaly    Extremities:   Extremities normal, atraumatic, no cyanosis or edema   Pulses:      Skin:   Skin is warm and dry,  no rashes or palpable lesions   Neurologic:   no focal deficits noted      [unfilled]  Data Review:    Results from last 7 days  Lab Units 12/07/17  1206   SODIUM mmol/L 144   POTASSIUM mmol/L 4.2   CHLORIDE mmol/L 104   CO2 mmol/L 26.7   BUN mg/dL 22   CREATININE mg/dL 1.07*   GLUCOSE mg/dL 172*   CALCIUM mg/dL 9.4       Results from last 7 days  Lab Units 12/07/17  1206   WBC 10*3/mm3 11.42*   HEMOGLOBIN g/dL 13.9   HEMATOCRIT % 43.1   PLATELETS 10*3/mm3 195           Results from last 7 days  Lab Units 12/08/17  0500   HEMOGLOBIN A1C % 6.60*       Lab Results  Lab Value Date/Time   TROPONINT 0.12 (C) 03/27/2014 0955   TROPONINT 0.14 (C) 03/27/2014 0355   TROPONINT 0.14 (C) 03/26/2014 1755   TROPONINT 0.15 (C) 03/26/2014 0349       Results from last 7 days  Lab Units 12/08/17  0500   CHOLESTEROL mg/dL 152   TRIGLYCERIDES mg/dL 124   HDL CHOL mg/dL 50       Results from last 7 days  Lab Units 12/07/17  1206   ALK PHOS U/L 115   BILIRUBIN mg/dL 0.6   ALT (SGPT) U/L 21   AST (SGOT) U/L 29           Results from last 7 days  Lab Units 12/08/17  0500   HEMOGLOBIN A1C % 6.60*     Glucose   Date/Time Value Ref Range Status   12/08/2017 1130 158 (H) 70 - 130 mg/dL Final   12/08/2017 0713 128 70 - 130 mg/dL Final   12/07/2017 2305 161 (H) 70 - 130 mg/dL Final   12/07/2017 1343 142 (H) 70 - 130 mg/dL Final       Results from last 7 days  Lab Units 12/08/17  0500 12/07/17  1206   INR  2.51* 2.82*       Patient Active Problem List   Diagnosis Code   • Neck and shoulder pain M54.2, M25.519   • Arthropathy of shoulder region M19.019   • Carpal tunnel syndrome of left wrist G56.02   • Chronic pain of both shoulders M25.511, G89.29, M25.512   • Chronic left shoulder pain M25.512, G89.29   • Arthropathy of left shoulder M19.012   • Dysarthria R47.1   • DM type 2 (diabetes mellitus, type 2)  E11.9   • Atrial fibrillation I48.91   • HLD (hyperlipidemia) E78.5   • Bronchitis J40   • CAD (coronary artery disease), hx of stent I25.10       Assessment:  Active Hospital Problems (** Indicates Principal Problem)    Diagnosis Date Noted   • **Dysarthria [R47.1] 12/07/2017   • DM type 2 (diabetes mellitus, type 2) [E11.9] 12/07/2017   • Atrial fibrillation [I48.91] 12/07/2017   • HLD (hyperlipidemia) [E78.5] 12/07/2017   • Bronchitis [J40] 12/07/2017   • CAD (coronary artery disease), hx of stent [I25.10] 12/07/2017   • Chronic left shoulder pain [M25.512, G89.29] 12/05/2017   • Arthropathy of left shoulder [M19.012] 12/05/2017   • Chronic pain of both shoulders [M25.511, G89.29, M25.512] 06/27/2017   • Arthropathy of shoulder region [M19.019] 03/24/2017   • Neck and shoulder pain [M54.2, M25.519] 03/24/2017   • Carpal tunnel syndrome of left wrist [G56.02] 03/24/2017      Resolved Hospital Problems    Diagnosis Date Noted Date Resolved   No resolved problems to display.       Plan:  Await neurology consult and ECHO EF 25%. Continue with coumadin asa and statin. Ask cardiology to see.    Saman Cruz MD  12/8/2017  11:43 AM

## 2017-12-09 LAB
ANION GAP SERPL CALCULATED.3IONS-SCNC: 11.5 MMOL/L
BASOPHILS # BLD AUTO: 0.02 10*3/MM3 (ref 0–0.2)
BASOPHILS NFR BLD AUTO: 0.2 % (ref 0–1.5)
BH CV XLRA MEAS LEFT DIST CCA EDV: -15.8 CM/SEC
BH CV XLRA MEAS LEFT DIST CCA PSV: -63.3 CM/SEC
BH CV XLRA MEAS LEFT DIST ICA EDV: -12.3 CM/SEC
BH CV XLRA MEAS LEFT DIST ICA PSV: -51 CM/SEC
BH CV XLRA MEAS LEFT ICA/CCA RATIO: 1
BH CV XLRA MEAS LEFT MID ICA EDV: -15.8 CM/SEC
BH CV XLRA MEAS LEFT MID ICA PSV: -63.9 CM/SEC
BH CV XLRA MEAS LEFT PROX CCA EDV: 14.1 CM/SEC
BH CV XLRA MEAS LEFT PROX CCA PSV: 72.1 CM/SEC
BH CV XLRA MEAS LEFT PROX ECA EDV: -11 CM/SEC
BH CV XLRA MEAS LEFT PROX ECA PSV: -95.8 CM/SEC
BH CV XLRA MEAS LEFT PROX ICA EDV: -17 CM/SEC
BH CV XLRA MEAS LEFT PROX ICA PSV: -50.4 CM/SEC
BH CV XLRA MEAS LEFT PROX SCLA PSV: 106 CM/SEC
BH CV XLRA MEAS LEFT VERTEBRAL A EDV: 15.8 CM/SEC
BH CV XLRA MEAS LEFT VERTEBRAL A PSV: 43.4 CM/SEC
BH CV XLRA MEAS RIGHT DIST CCA EDV: -14.7 CM/SEC
BH CV XLRA MEAS RIGHT DIST CCA PSV: -70.4 CM/SEC
BH CV XLRA MEAS RIGHT DIST ICA EDV: -10.1 CM/SEC
BH CV XLRA MEAS RIGHT DIST ICA PSV: -42.2 CM/SEC
BH CV XLRA MEAS RIGHT ICA/CCA RATIO: 0.8
BH CV XLRA MEAS RIGHT MID ICA EDV: -12.7 CM/SEC
BH CV XLRA MEAS RIGHT MID ICA PSV: -47.3 CM/SEC
BH CV XLRA MEAS RIGHT PROX CCA EDV: -14.1 CM/SEC
BH CV XLRA MEAS RIGHT PROX CCA PSV: -66.6 CM/SEC
BH CV XLRA MEAS RIGHT PROX ECA EDV: 10.1 CM/SEC
BH CV XLRA MEAS RIGHT PROX ECA PSV: 52.6 CM/SEC
BH CV XLRA MEAS RIGHT PROX ICA EDV: -16.4 CM/SEC
BH CV XLRA MEAS RIGHT PROX ICA PSV: -62.1 CM/SEC
BH CV XLRA MEAS RIGHT PROX SCLA PSV: 105 CM/SEC
BH CV XLRA MEAS RIGHT VERTEBRAL A EDV: 15.8 CM/SEC
BH CV XLRA MEAS RIGHT VERTEBRAL A PSV: 49.8 CM/SEC
BUN BLD-MCNC: 21 MG/DL (ref 8–23)
BUN/CREAT SERPL: 19.8 (ref 7–25)
CALCIUM SPEC-SCNC: 8.5 MG/DL (ref 8.6–10.5)
CHLORIDE SERPL-SCNC: 108 MMOL/L (ref 98–107)
CK SERPL-CCNC: 91 U/L (ref 20–180)
CO2 SERPL-SCNC: 26.5 MMOL/L (ref 22–29)
CREAT BLD-MCNC: 1.06 MG/DL (ref 0.57–1)
DEPRECATED RDW RBC AUTO: 53.5 FL (ref 37–54)
EOSINOPHIL # BLD AUTO: 0.24 10*3/MM3 (ref 0–0.7)
EOSINOPHIL NFR BLD AUTO: 2.6 % (ref 0.3–6.2)
ERYTHROCYTE [DISTWIDTH] IN BLOOD BY AUTOMATED COUNT: 14.3 % (ref 11.7–13)
GFR SERPL CREATININE-BSD FRML MDRD: 50 ML/MIN/1.73
GLUCOSE BLD-MCNC: 136 MG/DL (ref 65–99)
GLUCOSE BLDC GLUCOMTR-MCNC: 120 MG/DL (ref 70–130)
GLUCOSE BLDC GLUCOMTR-MCNC: 168 MG/DL (ref 70–130)
GLUCOSE BLDC GLUCOMTR-MCNC: 173 MG/DL (ref 70–130)
GLUCOSE BLDC GLUCOMTR-MCNC: 176 MG/DL (ref 70–130)
HCT VFR BLD AUTO: 42.1 % (ref 35.6–45.5)
HGB BLD-MCNC: 12.9 G/DL (ref 11.9–15.5)
IMM GRANULOCYTES # BLD: 0.02 10*3/MM3 (ref 0–0.03)
IMM GRANULOCYTES NFR BLD: 0.2 % (ref 0–0.5)
INR PPP: 2.66 (ref 0.9–1.1)
LEFT ARM BP: NORMAL MMHG
LYMPHOCYTES # BLD AUTO: 5.23 10*3/MM3 (ref 0.9–4.8)
LYMPHOCYTES NFR BLD AUTO: 55.9 % (ref 19.6–45.3)
MCH RBC QN AUTO: 31.5 PG (ref 26.9–32)
MCHC RBC AUTO-ENTMCNC: 30.6 G/DL (ref 32.4–36.3)
MCV RBC AUTO: 102.9 FL (ref 80.5–98.2)
MONOCYTES # BLD AUTO: 0.4 10*3/MM3 (ref 0.2–1.2)
MONOCYTES NFR BLD AUTO: 4.3 % (ref 5–12)
NEUTROPHILS # BLD AUTO: 3.45 10*3/MM3 (ref 1.9–8.1)
NEUTROPHILS NFR BLD AUTO: 36.8 % (ref 42.7–76)
PLATELET # BLD AUTO: 168 10*3/MM3 (ref 140–500)
PMV BLD AUTO: 10.7 FL (ref 6–12)
POTASSIUM BLD-SCNC: 4.6 MMOL/L (ref 3.5–5.2)
PROTHROMBIN TIME: 27.5 SECONDS (ref 11.7–14.2)
RBC # BLD AUTO: 4.09 10*6/MM3 (ref 3.9–5.2)
RIGHT ARM BP: NORMAL MMHG
SODIUM BLD-SCNC: 146 MMOL/L (ref 136–145)
WBC NRBC COR # BLD: 9.36 10*3/MM3 (ref 4.5–10.7)

## 2017-12-09 PROCEDURE — 85610 PROTHROMBIN TIME: CPT | Performed by: HOSPITALIST

## 2017-12-09 PROCEDURE — 82962 GLUCOSE BLOOD TEST: CPT

## 2017-12-09 PROCEDURE — 99231 SBSQ HOSP IP/OBS SF/LOW 25: CPT | Performed by: NURSE PRACTITIONER

## 2017-12-09 PROCEDURE — 99232 SBSQ HOSP IP/OBS MODERATE 35: CPT | Performed by: NURSE PRACTITIONER

## 2017-12-09 PROCEDURE — 80048 BASIC METABOLIC PNL TOTAL CA: CPT | Performed by: HOSPITALIST

## 2017-12-09 PROCEDURE — 85025 COMPLETE CBC W/AUTO DIFF WBC: CPT | Performed by: HOSPITALIST

## 2017-12-09 PROCEDURE — 63710000001 INSULIN ASPART PER 5 UNITS: Performed by: HOSPITALIST

## 2017-12-09 PROCEDURE — 82550 ASSAY OF CK (CPK): CPT | Performed by: HOSPITALIST

## 2017-12-09 RX ORDER — DOXYCYCLINE HYCLATE 100 MG/1
100 CAPSULE ORAL DAILY
COMMUNITY
End: 2017-12-10 | Stop reason: HOSPADM

## 2017-12-09 RX ORDER — OXYBUTYNIN CHLORIDE 10 MG/1
10 TABLET, EXTENDED RELEASE ORAL
COMMUNITY

## 2017-12-09 RX ORDER — ASPIRIN 325 MG
325 TABLET ORAL DAILY
Status: DISCONTINUED | OUTPATIENT
Start: 2017-12-09 | End: 2017-12-09

## 2017-12-09 RX ORDER — ROSUVASTATIN CALCIUM 10 MG/1
20 TABLET, COATED ORAL DAILY
COMMUNITY
End: 2018-03-08 | Stop reason: DRUGHIGH

## 2017-12-09 RX ADMIN — FAMOTIDINE 20 MG: 20 TABLET, FILM COATED ORAL at 08:55

## 2017-12-09 RX ADMIN — LOSARTAN POTASSIUM 25 MG: 25 TABLET, FILM COATED ORAL at 08:55

## 2017-12-09 RX ADMIN — PANTOPRAZOLE SODIUM 40 MG: 40 TABLET, DELAYED RELEASE ORAL at 09:00

## 2017-12-09 RX ADMIN — VITAMIN D, TAB 1000IU (100/BT) 1000 UNITS: 25 TAB at 17:55

## 2017-12-09 RX ADMIN — SODIUM CHLORIDE 75 ML/HR: 9 INJECTION, SOLUTION INTRAVENOUS at 02:42

## 2017-12-09 RX ADMIN — GLIPIZIDE 5 MG: 5 TABLET ORAL at 08:55

## 2017-12-09 RX ADMIN — ASPIRIN 325 MG: 325 TABLET ORAL at 09:00

## 2017-12-09 RX ADMIN — CLOPIDOGREL 75 MG: 75 TABLET, FILM COATED ORAL at 09:00

## 2017-12-09 RX ADMIN — POTASSIUM CHLORIDE 20 MEQ: 750 CAPSULE, EXTENDED RELEASE ORAL at 08:55

## 2017-12-09 RX ADMIN — CARVEDILOL 3.12 MG: 3.12 TABLET, FILM COATED ORAL at 08:55

## 2017-12-09 RX ADMIN — CETIRIZINE HYDROCHLORIDE 10 MG: 10 TABLET, FILM COATED ORAL at 08:55

## 2017-12-09 RX ADMIN — FAMOTIDINE 20 MG: 20 TABLET, FILM COATED ORAL at 17:55

## 2017-12-09 RX ADMIN — INSULIN ASPART 2 UNITS: 100 INJECTION, SOLUTION INTRAVENOUS; SUBCUTANEOUS at 21:43

## 2017-12-09 RX ADMIN — INSULIN ASPART 2 UNITS: 100 INJECTION, SOLUTION INTRAVENOUS; SUBCUTANEOUS at 12:05

## 2017-12-09 RX ADMIN — INSULIN ASPART 2 UNITS: 100 INJECTION, SOLUTION INTRAVENOUS; SUBCUTANEOUS at 17:55

## 2017-12-09 RX ADMIN — BUMETANIDE 1 MG: 1 TABLET ORAL at 08:55

## 2017-12-09 RX ADMIN — CARVEDILOL 3.12 MG: 3.12 TABLET, FILM COATED ORAL at 20:33

## 2017-12-09 RX ADMIN — WARFARIN SODIUM 4 MG: 4 TABLET ORAL at 17:55

## 2017-12-09 RX ADMIN — OXAZEPAM 10 MG: 10 CAPSULE, GELATIN COATED ORAL at 20:33

## 2017-12-09 RX ADMIN — ROSUVASTATIN CALCIUM 10 MG: 10 TABLET, FILM COATED ORAL at 20:33

## 2017-12-09 RX ADMIN — PANTOPRAZOLE SODIUM 40 MG: 40 TABLET, DELAYED RELEASE ORAL at 17:55

## 2017-12-09 NOTE — PROGRESS NOTES
[facility]  Cardiology Progress Note    Patient Identification:  Name: Caitlyn Longoria  Age: 83 y.o.  Sex: female  :  1934  MRN: [id number]                 Follow Up / Chief Complaint:  ISCM with EF < 25% by recent echo    HPI on admit: 83 year old female with a history of CAD s/p LOLA diagonal 2008 re-stented in  (100% occlusion of the mid LAD, severe disease of the large diagonal and insignificant disease of the RCA and circumflex), atrial fibrillation (warfarin), atrial flutter s/p ablation 10/2011, sick sinus syndrome/PPM medtronic  - RV lead revised  (last interrogated 10/18/2017), HTN, GERD, sleep apnea (CPAP), pulmonary HTN, obesity and diabetes. Patient had stress test on 2016  which revealed small area of ischemia in the septal wall. Presented to the ED with the c/o difficulty speaking.    She had echocardiogram ordered that was performed.  This demonstrated normal left ventricular size and but mildly increased left hypertrophy, global hypokinesis with an ejection fraction 23% which is a decrease from her prior echocardiogram of 2016 which showed an ejection fraction of 40%.     Interval History:     Subjective:  Speech back to normal she says.  Denies any syncope or near syncope, palps, pnd, shob, or chest pain.   Plans for possible dc soon.       Objective:    Tele pacing.      Temp:  [97.2 °F (36.2 °C)-98 °F (36.7 °C)] 97.4 °F (36.3 °C)  Heart Rate:  [79-83] 80  Resp:  [18-20] 18  BP: (115-156)/(81-91) 115/81    Past Medical History:  Past Medical History:   Diagnosis Date   • Abdominal pain    • Cough    • Diarrhea    • Difficulty breathing    • Dizziness    • DM type 2 (diabetes mellitus, type 2)      Past Surgical History:  Past Surgical History:   Procedure Laterality Date   • CARDIAC CATHETERIZATION     • CHOLECYSTECTOMY     • CORONARY STENT PLACEMENT     • HERNIA REPAIR     • HYSTERECTOMY     • PACEMAKER IMPLANTATION     • REPLACEMENT TOTAL KNEE           Social History:   Social History   Substance Use Topics   • Smoking status: Never Smoker   • Smokeless tobacco: Never Used   • Alcohol use No      Family History:  History reviewed. No pertinent family history.       Allergies:  Allergies   Allergen Reactions   • Ahist [Chlorpheniramine] Nausea Only, Other (See Comments) and Dizziness     Headache, Blurred vision   • Amoxicillin Other (See Comments)     Yeast infection   • Clarithromycin Nausea Only     Other reaction(s): Headache, Depression, Flushed   • Diclofenac      Voltaren   • Diphenhydramine      Benadryl   • Esomeprazole      Nexium. Stomach issues   • Ibuprofen Other (See Comments)     Does not recall    • Levalbuterol Swelling     Xopenex   • Levocetirizine Other (See Comments)     Xyzal. Diarrhea, Stomach cramps   • Lipitor [Atorvastatin] Other (See Comments)     Muscle pain and weakness, dark urine   • Lodine [Etodolac]    • Omeprazole Nausea Only     Prilosec. Headache   • Pravachol [Pravastatin] Nausea Only and Other (See Comments)     Bloated, Constipation, Headaches   • Quinapril Swelling and Confusion     Accupril. Headaches, constipation   • Sulfa Antibiotics    • Sulindac      Other reaction(s): Headache, joint pain, bruising   • Valdecoxib Irritability   • Prednisone Rash     Scheduled Meds:    bumetanide 1 mg QAM   carvedilol 3.125 mg Q12H   cetirizine 10 mg Daily   cholecalciferol 1,000 Units Daily With Dinner   clopidogrel 75 mg Daily   famotidine 20 mg BID   glipiZIDE 5 mg QAM   insulin aspart 0-7 Units 4x Daily With Meals & Nightly   losartan 25 mg Q24H   oxazepam 10 mg Nightly   pantoprazole 40 mg BID With Meals   potassium chloride 20 mEq Daily   rosuvastatin 10 mg Nightly   warfarin 4 mg Daily       INTAKE AND OUTPUT:    Intake/Output Summary (Last 24 hours) at 12/09/17 1415  Last data filed at 12/09/17 0855   Gross per 24 hour   Intake             1545 ml   Output                0 ml   Net             1545 ml       Review of  Systems:  GI: denies nv or abd pain  Cardiac:  Denies shob, pnd, or orthopnea  Pulmonary: denies cough or STAHL    Constitutional:  Temp:  [97.2 °F (36.2 °C)-98 °F (36.7 °C)] 97.4 °F (36.3 °C)  Heart Rate:  [79-83] 80  Resp:  [18-20] 18  BP: (115-156)/(81-91) 115/81    Physical Exam:  General:  Appears in no acute distress  Eyes: PERTL,  HEENT:  No JVD. Thyroid not visibly enlarged. No mucosal pallor or cyanosis  Respiratory: Respirations regular and unlabored at rest. BBS with good air entry in all fields. No crackles, rubs or wheezes auscultated  Cardiovascular: S1S2 Regular rate and rhythm. No murmur, rub or gallop. No carotid bruits. DP/PT pulses    2 . No pretibial pitting edema  Gastrointestinal: Abdomen soft, flat, non tender. Obese. Bowel sounds present. No hepatosplenomegaly. No ascites  Musculoskeletal: FISHER x4. No abnormal movements  Extremities: No digital clubbing or cyanosis, trace pedal edema.   Skin: Color pink. Skin warm and dry to touch. No rashes    Neuro: AAO x3 CN II-XII grossly intact  Psych: Mood and affect normal, pleasant and cooperative    Cardiographics  Telemetry: V-pacing.      ECG:     EKG 12/7/2017       EKG 10/18/2017      Echocardiogram:         Previous Testing:  ECHO 12/8/2017    Stress Test with Pet 12/8/2016     Carotid Duplex 12/8/17   Study Impression      • Right ICA Prox: Imaging indicates atherosclerotic plaque. Imaging of the right ICA indicates atherosclerotic plaque. Not hemodynamically signficant  • Right Vertebral: Antegrade flow is present.     All other right side carotid system vessels are normal.   Imaging of the right ICA indicates atherosclerotic plaque.    • Left ICA Prox: Imaging indicates atherosclerotic plaque. Imaging of the left ICA indicates atherosclerotic plaque. Not hemodynamically signficant  • Left Vertebral: Antegrade flow present.     All other left side carotid system vessels are normal.   Imaging of the left ICA indicates atherosclerotic plaque      CTA of head and neck.    IMPRESSION:  1. No evidence of acute infarction or hemorrhage. Vascular  calcification, small vessel ischemic disease and age-appropriate  atrophy.  2. There is atherosclerotic disease involving the carotid bifurcations  bilaterally more irregular on the left where there is a mild (stenosis  of the distal aspect of the common carotid artery (30%-40%) but with 0%  stenosis of the internal carotid artery using NASCET criteria. There is  no evidence of major intracranial arterial stenosis or occlusion. A CT  perfusion showed no convincing perfusion abnormality but was limited.  There is a suggestion of a moderate stenosis at the origin of the right  vertebral artery. See above.        Lab Review     Results from last 7 days  Lab Units 12/09/17  0542 12/08/17  0500   CK TOTAL U/L 91 141           Results from last 7 days  Lab Units 12/09/17  0542   SODIUM mmol/L 146*   POTASSIUM mmol/L 4.6   BUN mg/dL 21   CREATININE mg/dL 1.06*   CALCIUM mg/dL 8.5*     A1C 6.6 pm 12/8      Results from last 7 days  Lab Units 12/09/17  0542 12/07/17  1206   WBC 10*3/mm3 9.36 11.42*   HEMOGLOBIN g/dL 12.9 13.9   HEMATOCRIT % 42.1 43.1   PLATELETS 10*3/mm3 168 195       Results from last 7 days  Lab Units 12/09/17  0542 12/08/17  0500 12/07/17  1206   INR  2.66* 2.51* 2.82*     12/8/17 Lipid panel total 152, HDL 50, LDL 77, VLDL 24.8, Trigs 124, LDL/HDL 1.54    Assessment:  1. Dysarthria: neurology workup ongoing. She is now back to baseline. Noted right vetebral stenosis.  Is now on plavix and home dose of crestor 10 mg.  Is unable to take atorvastatin d/t myalgias.  LDL not to goal 77.  Consider uptitration to 20 mg on crestor.  Pt has had issues with statin intolerance in past.    2.  Chronic Afib with controlled rate with PPM.    3.  Chronic AC - INR 2.66 (2.51).  Has been therapeutic since August with monthly checks. Warfarin 4 mg scheduled for this evening.   She has plans for INR check Monday 12/11.  "Recommend she keep this appointment as she has had issues with elevated INR 1 week ago while on antibx and warfarin adjusted.  4.  Cardiomyopathy - reduction in EF from 40% 12/2016 to 23% on 12/2017.  Was started on medical management on losartan 25 mg daily and coreg 3.125 mg on 12/8.  --150/ 80-90's.  Will not up titrate at present due to lability in BP.  Stress nuc 12/8/16 showed small-sized, mildly severe area of ishcmia located in septal wall.  Can do further ischemia workup as outpt.  Will defer to attending cardiologist Dr. Veliz at outpt follow up.  Spoke with her about echo results and need to weigh daily and monitor salt intake.  Will provide her with information regarding CHF.    5.  Elevated A1C 6.6.  Pt is states  \"I do not have diabetes\".  Is on glucotrol at home.  Discussed need for blood sugar management to reduce CV risk.        Labs/tests ordered for am: INR      I reviewed the patient's new clinical results and reviewed CV treatment plan with patient.  I personally viewed and interpreted the patient's EKG/Telemetry data    )12/9/2017  KRISTIN Vance/Transcription:   \"Dictated utilizing Dragon dictation\".   "

## 2017-12-09 NOTE — PLAN OF CARE
Problem: Patient Care Overview (Adult)  Goal: Plan of Care Review  Outcome: Ongoing (interventions implemented as appropriate)    12/09/17 0549   Coping/Psychosocial Response Interventions   Plan Of Care Reviewed With patient   Patient Care Overview   Progress progress toward functional goals as expected   Outcome Evaluation   Outcome Summary/Follow up Plan Patient strength and speech improving, no complaints of pain, sleep well throughout night, will continue to monitor.         Problem: Stroke (Ischemic) (Adult)  Goal: Signs and Symptoms of Listed Potential Problems Will be Absent or Manageable (Stroke)  Outcome: Ongoing (interventions implemented as appropriate)

## 2017-12-09 NOTE — PROGRESS NOTES
"   LOS: 2 days   Patient Care Team:  Zenaida Suarez MD as PCP - General (Internal Medicine)    Chief Complaint:    Chief Complaint   Patient presents with   • Speech Problem     has not been able to articulate well since yesterday - \"also having trouble with numbers trying to make a phone call\"       Subjective     Interval History:     Patient Complaints: none  Patient Denies: recurrent speech trouble, h/a  History taken from: patient chart RN    Objective     Vital Signs  Temp:  [97.2 °F (36.2 °C)-98 °F (36.7 °C)] 97.4 °F (36.3 °C)  Heart Rate:  [79-83] 80  Resp:  [18-20] 18  BP: (115-156)/(81-91) 115/81      Physical Examination:  HEENT: Normocephalic, atraumatic   COR: RRR  Resp: Even and unlabored  Extremities: Equal pulses, non distal embolization    Neurological:   MS: AO. Language normal. No neglect. Higher integrative function normal  CN: II-XII normal  Motor: 5/5, normal tone      Results Review:     I reviewed the patient's new clinical results.      Results from last 7 days  Lab Units 12/08/17  0500   HEMOGLOBIN A1C % 6.60*       Results from last 7 days  Lab Units 12/09/17  0542 12/07/17  1206   WBC 10*3/mm3 9.36 11.42*   HEMOGLOBIN g/dL 12.9 13.9   HEMATOCRIT % 42.1 43.1   PLATELETS 10*3/mm3 168 195       Results from last 7 days  Lab Units 12/08/17  0500   CHOLESTEROL mg/dL 152     Lab Results   Component Value Date    LDLCALC 77 12/08/2017       Results from last 7 days  Lab Units 12/09/17  0542 12/07/17  1206   SODIUM mmol/L 146* 144   POTASSIUM mmol/L 4.6 4.2   CHLORIDE mmol/L 108* 104   CO2 mmol/L 26.5 26.7   BUN mg/dL 21 22   CREATININE mg/dL 1.06* 1.07*   CALCIUM mg/dL 8.5* 9.4   BILIRUBIN mg/dL  --  0.6   ALK PHOS U/L  --  115   ALT (SGPT) U/L  --  21   AST (SGOT) U/L  --  29   GLUCOSE mg/dL 136* 172*     12/2017 carotid US  Right  ICA Prox -62.1 cm/sec       -16.4 cm/sec         Left   ICA Prox -50.4 cm/sec       -17.0 cm/sec           12/2017 TTE  Left Ventricle  Left ventricular " systolic function is severely decreased. Calculated EF = 23.4%. Estimated EF was in agreement with the calculated EF. Estimated EF = 23%. Normal left ventricular cavity size noted. Left ventricular wall thickness is consistent with mild concentric hypertrophy. Left ventricular diastolic dysfunction is noted (grade I a w/high LAP) consistent with impaired relaxation.   Right Ventricle  Normal right ventricular cavity size, wall thickness and systolic function noted. Electronic lead present in the ventricle.   Left Atrium  Left atrial volume is moderately increased. Saline test results are negative.   Right Atrium  Right atrial cavity size is mildly dilated. The inferior vena cava is indeterminate in size (unable to calculate RVSP).   Aortic Valve  The valve was poorly visualized but appears trileaflet. The aortic valve is abnormal in structure. There is moderate thickening of the aortic valve. Trace aortic valve regurgitation is present. No aortic valve stenosis is present.       Medication Review: reviewed, changes made    Assessment/Plan  Ms. Longoria is an 82yo with CAD, AF, DM and HTN who presented on 12/7 with recurrent aphasia. Imaging does not show any evidence of an acute stroke but she cannot have an MRI scan.  She does however have a very large bulky hypodense ulcerated plaque in the left common carotid artery extending into the internal carotid artery.  This is associated with a high risk of embolization. The carotid US does not confirm a stenosis therefore, surgery is not indicated at this time. She needs medical therapy, add plavix and increase dose of Crestor for goal LDL <70. She is therapeutic on her warfarin which argues against atrial fibrillation being the etiology. However, she has a low EF but again therapeutic on anticoagulation. If she were to have recurrent events despite the addition of antiplatelet therapy then she might need carotid revascularization. A HAMLET may be reasonable if she has  another event especially if it's an another arterial distribution.    Plan:   - Plavix 75mg and coumadin    - Crestor 20mg    - Neurochecks   - Non-pharmacological DVT prophylaxis   - EKG Tele   - PT/OT/ST   - Stroke Education   - Blood pressure control to <130/80   - Goal LDL <70-recommend high dose statins-    - Serum glucose < 140   - CCP for D/C planning   - FU in 3 months with CTA with RB or LQ  Principal Problem:    Dysarthria  Active Problems:    Neck and shoulder pain    Arthropathy of shoulder region    Carpal tunnel syndrome of left wrist    Chronic pain of both shoulders    Chronic left shoulder pain    Arthropathy of left shoulder    DM type 2 (diabetes mellitus, type 2)    Atrial fibrillation    HLD (hyperlipidemia)    Bronchitis    CAD (coronary artery disease), hx of stent      I have discussed the above with the patient and nurse.    Janet Yoder, KRISTIN  12/09/17  3:18 PM

## 2017-12-09 NOTE — PROGRESS NOTES
[facility]     LOS: 2 days     Subjective:  Speech improved this am, speech now near 100%  No cp  No soa  No n/v/d    Objective:    Vital Signs:  Temp:  [97.2 °F (36.2 °C)-98 °F (36.7 °C)] 97.4 °F (36.3 °C)  Heart Rate:  [79-83] 80  Resp:  [18-20] 18  BP: (115-156)/(81-91) 115/81    General Appearance: no acute distress, appears comfortable sitting in the chair and taking notes on what I am telling her  Heart: regular rate and rhythm  Lungs: clear to auscultation bilaterally, respirations unlabored, good air entry  Abdomen: soft, non-tender, no guarding, no rebound, non-distended  Extremities: no edema, no cyanosis  Neurology: speech normal, CN grossly normal  Psychiatric: normal mood and affect    Results Review:    Glucose   Date Value Ref Range Status   12/09/2017 136 (H) 65 - 99 mg/dL Final   12/07/2017 172 (H) 65 - 99 mg/dL Final       Results from last 7 days  Lab Units 12/09/17  0542   WBC 10*3/mm3 9.36   HEMOGLOBIN g/dL 12.9   HEMATOCRIT % 42.1   PLATELETS 10*3/mm3 168       Results from last 7 days  Lab Units 12/09/17  0542 12/07/17  1206   SODIUM mmol/L 146* 144   POTASSIUM mmol/L 4.6 4.2   CHLORIDE mmol/L 108* 104   CO2 mmol/L 26.5 26.7   BUN mg/dL 21 22   CREATININE mg/dL 1.06* 1.07*   CALCIUM mg/dL 8.5* 9.4   BILIRUBIN mg/dL  --  0.6   ALK PHOS U/L  --  115   ALT (SGPT) U/L  --  21   AST (SGOT) U/L  --  29   GLUCOSE mg/dL 136* 172*       Results from last 7 days  Lab Units 12/09/17  0542   INR  2.66*           Results from last 7 days  Lab Units 12/09/17  0542 12/08/17  0500   CK TOTAL U/L 91 141     Cultures:       I have reviewed daily medications and changes in CPOE    Scheduled meds    bumetanide 1 mg Oral QAM   carvedilol 3.125 mg Oral Q12H   cetirizine 10 mg Oral Daily   cholecalciferol 1,000 Units Oral Daily With Dinner   clopidogrel 75 mg Oral Daily   famotidine 20 mg Oral BID   glipiZIDE 5 mg Oral QAM   insulin aspart 0-7 Units Subcutaneous 4x Daily With Meals &  Nightly   losartan 25 mg Oral Q24H   oxazepam 10 mg Oral Nightly   pantoprazole 40 mg Oral BID With Meals   potassium chloride 20 mEq Oral Daily   rosuvastatin 10 mg Oral Nightly   warfarin 4 mg Oral Daily         sodium chloride 75 mL/hr Last Rate: Stopped (12/09/17 1405)     PRN meds  •  acetaminophen **OR** acetaminophen  •  dextrose  •  dextrose  •  glucagon (human recombinant)  •  ondansetron **OR** ondansetron ODT **OR** ondansetron  •  sodium chloride  •  sodium chloride      Principal Problem:    Dysarthria  Active Problems:    Neck and shoulder pain    Arthropathy of shoulder region    Carpal tunnel syndrome of left wrist    Chronic pain of both shoulders    Chronic left shoulder pain    Arthropathy of left shoulder    DM type 2 (diabetes mellitus, type 2)    Atrial fibrillation    HLD (hyperlipidemia)    Bronchitis    CAD (coronary artery disease), hx of stent        Assessment/Plan:      Likely d/c in the am  Given the speech deficit is only resolved this am, I would monitor in the hospital at least till tomorrow.      >25 minutes spent, > 1/2 time spent counseling and coordination of care   Paco Salinas MD  12/09/17  6:27 PM

## 2017-12-09 NOTE — PLAN OF CARE
Problem: Patient Care Overview (Adult)  Goal: Plan of Care Review  Outcome: Ongoing (interventions implemented as appropriate)    12/09/17 8796   Coping/Psychosocial Response Interventions   Plan Of Care Reviewed With patient   Patient Care Overview   Progress improving   Outcome Evaluation   Outcome Summary/Follow up Plan up in chair all day. asst x1 + walker to ambulate. asa d/c'd and pt will remain on both plavix & coumadin. CHF & stroke booklets and education provided. no other issues. VSS. home tomorrow--ABDI Bone RN       Goal: Adult Individualization and Mutuality  Outcome: Ongoing (interventions implemented as appropriate)  Goal: Discharge Needs Assessment  Outcome: Ongoing (interventions implemented as appropriate)    Problem: Stroke (Ischemic) (Adult)  Goal: Signs and Symptoms of Listed Potential Problems Will be Absent or Manageable (Stroke)  Outcome: Ongoing (interventions implemented as appropriate)

## 2017-12-10 VITALS
SYSTOLIC BLOOD PRESSURE: 154 MMHG | WEIGHT: 225 LBS | RESPIRATION RATE: 16 BRPM | BODY MASS INDEX: 37.49 KG/M2 | TEMPERATURE: 97.5 F | HEART RATE: 81 BPM | OXYGEN SATURATION: 95 % | DIASTOLIC BLOOD PRESSURE: 92 MMHG | HEIGHT: 65 IN

## 2017-12-10 PROBLEM — I63.9 CVA (CEREBRAL VASCULAR ACCIDENT) (HCC): Status: ACTIVE | Noted: 2017-12-10

## 2017-12-10 LAB
GLUCOSE BLDC GLUCOMTR-MCNC: 103 MG/DL (ref 70–130)
GLUCOSE BLDC GLUCOMTR-MCNC: 108 MG/DL (ref 70–130)
INR PPP: 2.38 (ref 0.9–1.1)
PROTHROMBIN TIME: 25.2 SECONDS (ref 11.7–14.2)

## 2017-12-10 PROCEDURE — 85610 PROTHROMBIN TIME: CPT | Performed by: HOSPITALIST

## 2017-12-10 PROCEDURE — 82962 GLUCOSE BLOOD TEST: CPT

## 2017-12-10 RX ORDER — CARVEDILOL 3.12 MG/1
3.12 TABLET ORAL EVERY 12 HOURS SCHEDULED
Qty: 60 TABLET | Refills: 0 | Status: SHIPPED | OUTPATIENT
Start: 2017-12-10 | End: 2017-12-26 | Stop reason: SDUPTHER

## 2017-12-10 RX ORDER — LOSARTAN POTASSIUM 25 MG/1
25 TABLET ORAL
Qty: 30 TABLET | Refills: 0 | Status: SHIPPED | OUTPATIENT
Start: 2017-12-11 | End: 2017-12-26 | Stop reason: SDUPTHER

## 2017-12-10 RX ORDER — ASPIRIN 325 MG
325 TABLET ORAL DAILY
Status: DISCONTINUED | OUTPATIENT
Start: 2017-12-10 | End: 2017-12-10 | Stop reason: HOSPADM

## 2017-12-10 RX ORDER — ASPIRIN 325 MG
325 TABLET ORAL DAILY
Qty: 30 TABLET | Refills: 0 | Status: SHIPPED | OUTPATIENT
Start: 2017-12-11 | End: 2018-01-14

## 2017-12-10 RX ORDER — CLOPIDOGREL BISULFATE 75 MG/1
75 TABLET ORAL DAILY
Qty: 30 TABLET | Refills: 0 | Status: SHIPPED | OUTPATIENT
Start: 2017-12-11 | End: 2018-01-14

## 2017-12-10 RX ADMIN — POTASSIUM CHLORIDE 20 MEQ: 750 CAPSULE, EXTENDED RELEASE ORAL at 08:18

## 2017-12-10 RX ADMIN — CLOPIDOGREL 75 MG: 75 TABLET, FILM COATED ORAL at 08:18

## 2017-12-10 RX ADMIN — CETIRIZINE HYDROCHLORIDE 10 MG: 10 TABLET, FILM COATED ORAL at 08:18

## 2017-12-10 RX ADMIN — PANTOPRAZOLE SODIUM 40 MG: 40 TABLET, DELAYED RELEASE ORAL at 08:18

## 2017-12-10 RX ADMIN — FAMOTIDINE 20 MG: 20 TABLET, FILM COATED ORAL at 08:18

## 2017-12-10 RX ADMIN — GLIPIZIDE 5 MG: 5 TABLET ORAL at 06:53

## 2017-12-10 RX ADMIN — BUMETANIDE 1 MG: 1 TABLET ORAL at 06:53

## 2017-12-10 RX ADMIN — LOSARTAN POTASSIUM 25 MG: 25 TABLET, FILM COATED ORAL at 08:18

## 2017-12-10 RX ADMIN — ASPIRIN 325 MG: 325 TABLET ORAL at 12:24

## 2017-12-10 RX ADMIN — CARVEDILOL 3.12 MG: 3.12 TABLET, FILM COATED ORAL at 08:18

## 2017-12-10 NOTE — DISCHARGE SUMMARY
[facility]  [phone]    DISCHARGE SUMMARY  [facility]    Patient Identification:  Name: Caitlyn Longoria  Age: 83 y.o.  Sex: female  :  1934  MRN: [id number]  Primary Care Physician: Zenaida Suarez MD    Admit date: 2017  Discharge date and time:      Discharge Diagnoses:Principal Problem:    CVA (cerebral vascular accident)  Active Problems:    Neck and shoulder pain    Arthropathy of shoulder region    Carpal tunnel syndrome of left wrist    Chronic pain of both shoulders    Chronic left shoulder pain    Arthropathy of left shoulder    Dysarthria    DM type 2 (diabetes mellitus, type 2)    Atrial fibrillation    HLD (hyperlipidemia)    Bronchitis    CAD (coronary artery disease), hx of stent       History of present illness from H&P:    The patient's an 83-year-old white female with history of chronic atrial fib, diabetes mellitus type 2, coronary artery disease with history of stent, pacemaker, and arthritis who is admitted with a 2 day 3 day history of having some speech problems.  She had some trouble speaking and expressing herself.  She has been on antibiotics with Cipro for recent urinary tract infection.  She finished those a couple of days ago.  She's also had some problems with  arthritis and shoulder problems and had her left shoulder injected with cortisone recently.  She had a cough and some bronchitis recently she'd seen Dr. Cota and was given her some other medicines for her reflux symptoms.  The patient was evaluated in the ER and admitted for further evaluation treatment of her symptoms with concern for possible stroke or TIA.    She does have chronic atrial fibrillation and is been on Coumadin and her INR is in the therapeutic range.      Hospital Course:     Patient was unable to give an MRI because of her pacer.  But the patient had significant dysarthria which resolved the day prior to discharge.  So there was  concern that this was a CVA.  Because of hypodense ulcerated plaque in the left common carotid neurosurgery recommended the patient being started on antiplatelets.  Patient was started on Plavix but was having bleeding at Band-Aid sites and even with peeling leads off her skin.  She also had a conjunctival hemorrhage.  So instead of Plavix we change the patient over to aspirin.  And the patient is to remain on Coumadin.    Concern for stroke, started on plavix which is changed to aspirin and the patient is to continue with coumadin.    Dysarthria has resolved      DM type 2 (diabetes mellitus, type 2)-a1c 6.6, Blood sugars are good      Atrial fibrillation inr is 2.38, ventricular paced       HLD (hyperlipidemia)      Bronchitis- currently stable      CAD (coronary artery disease), hx of stent, aspirin    Chronic systolic heart failure- EF 23%, stable diuring the hospitalization, coreg and diovan    Hypertension and also stroke prevention-patient was started on Coreg and Diovan    Patient's speech is normal today.  The patient sitting comfortably in her chair when I spoke with her.  The patient was writing down the directions that I was giving her in a note pad.  She was much improved according to her recollection from admission.    Consults:     Consults     Date and Time Order Name Status Description    12/8/2017 1151 Inpatient Consult to Cardiology Completed     12/7/2017 1902 Inpatient Consult to Neurology Completed     12/7/2017 1452 [facility] (on-call MD unless specified) Completed             Results from last 7 days  Lab Units 12/09/17  0542   WBC 10*3/mm3 9.36   HEMOGLOBIN g/dL 12.9   HEMATOCRIT % 42.1   PLATELETS 10*3/mm3 168         Results from last 7 days  Lab Units 12/09/17  0542   SODIUM mmol/L 146*   POTASSIUM mmol/L 4.6   CHLORIDE mmol/L 108*   CO2 mmol/L 26.5   BUN mg/dL 21   CREATININE mg/dL 1.06*   GLUCOSE mg/dL 136*   CALCIUM mg/dL 8.5*       Significant Diagnostic Studies:   Lab Results   Component  Value Date    WBC 9.36 12/09/2017    HGB 12.9 12/09/2017    HCT 42.1 12/09/2017     12/09/2017     Lab Results   Component Value Date     (H) 12/09/2017    K 4.6 12/09/2017     (H) 12/09/2017    CO2 26.5 12/09/2017    BUN 21 12/09/2017    CREATININE 1.06 (H) 12/09/2017    GLUCOSE 136 (H) 12/09/2017     Lab Results   Component Value Date    CALCIUM 8.5 (L) 12/09/2017     Lab Results   Component Value Date    AST 29 12/07/2017    ALT 21 12/07/2017    ALKPHOS 115 12/07/2017     Lab Results   Component Value Date    INR 2.38 (H) 12/10/2017     No results found for: COLORU, CLARITYU, SPECGRAV, PHUR, PROTEINUR, GLUCOSEU, KETONESU, BLOODU, NITRITE, LEUKOCYTESUR, BILIRUBINUR, UROBILINOGEN, RBCUA, WBCUA, BACTERIA  No results found for: TROPONINT, TROPONINI, BNP  No components found for: HGBA1C;2  No components found for: TSH;2    Imaging Results (all)     Procedure Component Value Units Date/Time    CT Head Without Contrast Stroke Protocol [[id number]] Collected:  12/07/17 1345     Updated:  12/07/17 1446    Narrative:       CT SCAN OF THE HEAD WITHOUT CONTRAST     CLINICAL HISTORY:  Confusion.     TECHNIQUE: CT scan of the head was obtained with 3 mm axial images. No  intravenous contrast was administered.     FINDINGS:  The ventricles, sulci, and cisterns are age appropriate. The basal  ganglia and thalami are unremarkable in appearance. The posterior fossa  structures are within normal limits.       Impression:       No evidence for acute intracranial pathology. If there continues to be  suspicion for an acute infarct, further evaluation could be performed  with MR imaging for much more sensitive and specific evaluation.     Radiation dose reduction techniques were utilized, including automated  exposure control and exposure modulation based on body size.     This report was finalized on 12/7/2017 2:43 PM by Dr. Anderson Bruce MD.       XR Chest 1 View [[id number]] Collected:  12/07/17 1412     Updated:   12/08/17 0732    Narrative:       ONE VIEW PORTABLE CHEST     HISTORY: Shortness of breath.     There is prominent elevation of the left hemidiaphragm unchanged from  previous studies. The heart remains enlarged with a pacemaker in place.  The right lung is clear except for a minimal linear band of atelectasis.  There is some localized atelectasis near the left hilum that is  unchanged from 2014. No significant acute abnormality is seen.     This report was finalized on 12/8/2017 7:29 AM by Dr. Guille Trotter MD.       CT Angiogram Head With Contrast [[id number]] Collected:  12/07/17 2157     Updated:  12/08/17 0934    Narrative:       CT PERFUSION WITH CONTRAST AND CT ANGIOGRAM NECK AND HEAD WITH CONTRAST      HISTORY: Stroke, difficulty speaking, expressive aphasia.      COMPARISON: CT head 12/07/2017 at 1215 hours.     Initially, a noncontrasted CT examination of brain was performed. There  is atrophy, small vessel ischemic disease and vascular calcification.  There is no evidence of intracranial hemorrhage or of a focal area of  decreased attenuation to suggest acute infarction.     The CT perfusion examination was performed. The study is hampered by the  bolus. The suboptimal examination shows no convincing perfusion  abnormality.     A CT angiogram of the neck and head followed. Multiplanar as well as  3-dimensional reconstructions were also generated.     The great vessels are arranged in a classic configuration. There is at  least moderate calcified plaque appreciated involving the proximal  aspects of the internal carotid artery on the right. There is only mild  stenosis using NASCET criteria (less than 20%). On the left there is  eccentric noncalcified and to a lesser extent calcified plaque involving  the distal aspect of the common carotid artery resulting in  approximately 30% to 40% stenosis. Noncalcified plaque involving of the  proximal aspect of the internal carotid artery on the left is noted  with  0% stenosis using NASCET criteria. There is mild to moderate  irregularity of the plaque suggested. There is tortuosity and redundancy  involving the cervical portions of the internal carotid arteries  bilaterally, more so on the left but with no evidence of a focal  high-grade stenosis or kink. Vascular calcification involving the  carotid siphons are noted without high-grade stenosis. The proximal  aspects of the anterior and middle cerebral arteries appear  unremarkable.     Both vertebral arteries were opacified. The origins of the vertebral  arteries are somewhat difficult to assess secondary to beam hardening  artifact but no convincing stenosis is present on the left. There is a  suggestion of a moderate stenosis of the right vertebral artery at its  origin. The left vertebral artery is larger than that of the right. The  basilar artery and the proximal aspects of the posterior cerebral  arteries appear unremarkable. A standard postcontrast CT examination of  the brain showed no evidence of abnormal enhancement. Sagittal  reconstructions demonstrate reversal of normal cervical lordosis with  moderate loss of disc height at C5-6 and C6-7.       Impression:       1. No evidence of acute infarction or hemorrhage. Vascular  calcification, small vessel ischemic disease and age-appropriate  atrophy.  2. There is atherosclerotic disease involving the carotid bifurcations  bilaterally more irregular on the left where there is a mild (stenosis  of the distal aspect of the common carotid artery (30%-40%) but with 0%  stenosis of the internal carotid artery using NASCET criteria. There is  no evidence of major intracranial arterial stenosis or occlusion. A CT  perfusion showed no convincing perfusion abnormality but was limited.  There is a suggestion of a moderate stenosis at the origin of the right  vertebral artery. See above.                 Radiation dose reduction techniques were utilized, including  automated  exposure control and exposure modulation based on body size.     This report was finalized on 12/8/2017 9:31 AM by Dr. Paco Carmen MD.       CT Angiogram Neck With & Without Contrast [[id number]] Collected:  12/07/17 2157     Updated:  12/08/17 0934    Narrative:       CT PERFUSION WITH CONTRAST AND CT ANGIOGRAM NECK AND HEAD WITH CONTRAST      HISTORY: Stroke, difficulty speaking, expressive aphasia.      COMPARISON: CT head 12/07/2017 at 1215 hours.     Initially, a noncontrasted CT examination of brain was performed. There  is atrophy, small vessel ischemic disease and vascular calcification.  There is no evidence of intracranial hemorrhage or of a focal area of  decreased attenuation to suggest acute infarction.     The CT perfusion examination was performed. The study is hampered by the  bolus. The suboptimal examination shows no convincing perfusion  abnormality.     A CT angiogram of the neck and head followed. Multiplanar as well as  3-dimensional reconstructions were also generated.     The great vessels are arranged in a classic configuration. There is at  least moderate calcified plaque appreciated involving the proximal  aspects of the internal carotid artery on the right. There is only mild  stenosis using NASCET criteria (less than 20%). On the left there is  eccentric noncalcified and to a lesser extent calcified plaque involving  the distal aspect of the common carotid artery resulting in  approximately 30% to 40% stenosis. Noncalcified plaque involving of the  proximal aspect of the internal carotid artery on the left is noted with  0% stenosis using NASCET criteria. There is mild to moderate  irregularity of the plaque suggested. There is tortuosity and redundancy  involving the cervical portions of the internal carotid arteries  bilaterally, more so on the left but with no evidence of a focal  high-grade stenosis or kink. Vascular calcification involving the  carotid siphons are noted  without high-grade stenosis. The proximal  aspects of the anterior and middle cerebral arteries appear  unremarkable.     Both vertebral arteries were opacified. The origins of the vertebral  arteries are somewhat difficult to assess secondary to beam hardening  artifact but no convincing stenosis is present on the left. There is a  suggestion of a moderate stenosis of the right vertebral artery at its  origin. The left vertebral artery is larger than that of the right. The  basilar artery and the proximal aspects of the posterior cerebral  arteries appear unremarkable. A standard postcontrast CT examination of  the brain showed no evidence of abnormal enhancement. Sagittal  reconstructions demonstrate reversal of normal cervical lordosis with  moderate loss of disc height at C5-6 and C6-7.       Impression:       1. No evidence of acute infarction or hemorrhage. Vascular  calcification, small vessel ischemic disease and age-appropriate  atrophy.  2. There is atherosclerotic disease involving the carotid bifurcations  bilaterally more irregular on the left where there is a mild (stenosis  of the distal aspect of the common carotid artery (30%-40%) but with 0%  stenosis of the internal carotid artery using NASCET criteria. There is  no evidence of major intracranial arterial stenosis or occlusion. A CT  perfusion showed no convincing perfusion abnormality but was limited.  There is a suggestion of a moderate stenosis at the origin of the right  vertebral artery. See above.                 Radiation dose reduction techniques were utilized, including automated  exposure control and exposure modulation based on body size.     This report was finalized on 12/8/2017 9:31 AM by Dr. Paco Carmen MD.       CT Cerebral Perfusion With & Without Contrast [[id number]] Collected:  12/07/17 2157     Updated:  12/08/17 0934    Narrative:       CT PERFUSION WITH CONTRAST AND CT ANGIOGRAM NECK AND HEAD WITH CONTRAST      HISTORY:  Stroke, difficulty speaking, expressive aphasia.      COMPARISON: CT head 12/07/2017 at 1215 hours.     Initially, a noncontrasted CT examination of brain was performed. There  is atrophy, small vessel ischemic disease and vascular calcification.  There is no evidence of intracranial hemorrhage or of a focal area of  decreased attenuation to suggest acute infarction.     The CT perfusion examination was performed. The study is hampered by the  bolus. The suboptimal examination shows no convincing perfusion  abnormality.     A CT angiogram of the neck and head followed. Multiplanar as well as  3-dimensional reconstructions were also generated.     The great vessels are arranged in a classic configuration. There is at  least moderate calcified plaque appreciated involving the proximal  aspects of the internal carotid artery on the right. There is only mild  stenosis using NASCET criteria (less than 20%). On the left there is  eccentric noncalcified and to a lesser extent calcified plaque involving  the distal aspect of the common carotid artery resulting in  approximately 30% to 40% stenosis. Noncalcified plaque involving of the  proximal aspect of the internal carotid artery on the left is noted with  0% stenosis using NASCET criteria. There is mild to moderate  irregularity of the plaque suggested. There is tortuosity and redundancy  involving the cervical portions of the internal carotid arteries  bilaterally, more so on the left but with no evidence of a focal  high-grade stenosis or kink. Vascular calcification involving the  carotid siphons are noted without high-grade stenosis. The proximal  aspects of the anterior and middle cerebral arteries appear  unremarkable.     Both vertebral arteries were opacified. The origins of the vertebral  arteries are somewhat difficult to assess secondary to beam hardening  artifact but no convincing stenosis is present on the left. There is a  suggestion of a moderate stenosis  of the right vertebral artery at its  origin. The left vertebral artery is larger than that of the right. The  basilar artery and the proximal aspects of the posterior cerebral  arteries appear unremarkable. A standard postcontrast CT examination of  the brain showed no evidence of abnormal enhancement. Sagittal  reconstructions demonstrate reversal of normal cervical lordosis with  moderate loss of disc height at C5-6 and C6-7.       Impression:       1. No evidence of acute infarction or hemorrhage. Vascular  calcification, small vessel ischemic disease and age-appropriate  atrophy.  2. There is atherosclerotic disease involving the carotid bifurcations  bilaterally more irregular on the left where there is a mild (stenosis  of the distal aspect of the common carotid artery (30%-40%) but with 0%  stenosis of the internal carotid artery using NASCET criteria. There is  no evidence of major intracranial arterial stenosis or occlusion. A CT  perfusion showed no convincing perfusion abnormality but was limited.  There is a suggestion of a moderate stenosis at the origin of the right  vertebral artery. See above.                 Radiation dose reduction techniques were utilized, including automated  exposure control and exposure modulation based on body size.     This report was finalized on 12/8/2017 9:31 AM by Dr. Paco Carmen MD.             TEST  RESULTS PENDING AT DISCHARGE      Patient Instructions:    Caitlyn Longoria   Home Medication Instructions LINETTE:[id number]    Printed on:12/10/17 1413   Medication Information                      aspirin 325 MG tablet  Take 1 tablet by mouth Daily.             azelastine (ASTEPRO) 0.15 % solution nasal spray  2 sprays into each nostril 2 (Two) Times a Day.             azelastine (OPTIVAR) 0.05 % ophthalmic solution  Administer 1 drop to both eyes 2 (Two) Times a Day.             bumetanide (BUMEX) 1 MG tablet  Take 1 mg by mouth Every Morning.             carvedilol  (COREG) 3.125 MG tablet  Take 1 tablet by mouth Every 12 (Twelve) Hours for 30 days.             cetirizine (ZyrTEC) 10 MG tablet  Take 10 mg by mouth Daily.             cholecalciferol (VITAMIN D3) 1000 units tablet  Take 1,000 Units by mouth Daily With Dinner.             clopidogrel (PLAVIX) 75 MG tablet  Take 1 tablet by mouth Daily.             fenofibrate (TRICOR) 145 MG tablet  Take 145 mg by mouth Daily. PT IS NO LONGER TAKING MED 12/9/2017             flunisolide (NASALIDE) 25 MCG/ACT (0.025%) solution nasal spray  Inhale 2 sprays Every 12 (Twelve) Hours.             glipiZIDE (GLUCOTROL) 5 MG tablet  Take 5 mg by mouth Every Morning.             losartan (COZAAR) 25 MG tablet  Take 1 tablet by mouth Daily for 30 days.             Melatonin 3 MG tablet  Take 3 mg by mouth Every Night.             oxazepam (SERAX) 10 MG capsule  Take 10 mg by mouth Every Night.             oxybutynin XL (DITROPAN-XL) 10 MG 24 hr tablet  Take 10 mg by mouth Daily.             pantoprazole (PROTONIX) 40 MG EC tablet  Take 40 mg by mouth 2 (Two) Times a Day With Meals.             rosuvastatin (CRESTOR) 10 MG tablet  Take 10 mg by mouth Daily.             warfarin (COUMADIN) 4 MG tablet  Take 4 mg by mouth Daily.               Future Appointments  Date Time Provider Department Center   1/25/2018 12:00 AM DIEGO GOMEZ CD [facility] None   2/1/2018 1:30 PM Alfredo Hunter MD [facility] None   10/18/2018 10:20 AM MD JAY Go [facility] None     Follow-up Information     Follow up with Zenaida Suarez MD .    Specialty:  Internal Medicine    Contact information:    [address]  [address]  [facility] [address] [phone]          Discharge Order     Start     Ordered    12/10/17 1002  Discharge patient  Once     Expected Discharge Date:  12/10/17    Discharge Disposition:  Home or Self Care        12/10/17 1001        If weight is increased by 4 lbs in 24 hours or 6 lbs in a week please call  our doctor about possibly increasing your diuretic medication    CBC and CMP in 1-2 weeks when you follow up with your Primary Care Doctor    Follow up with your Primary Care Doctor within 1- 2 weeks    Monitor sodium intake    Measure BP daily and write down to take to your Primary Care Doctor appointment.    F/u Neurology call for appt    F/u cardiology 4-6 weeks        Total time spent discharging patient including evaluation, post hospitalization follow up,  medication and post hospitalization instructions and education, total time exceeds 30 minutes.    Signed:  Paco Salinas MD  12/10/2017  10:08 AM

## 2017-12-10 NOTE — PLAN OF CARE
Problem: Patient Care Overview (Adult)  Goal: Plan of Care Review  Outcome: Ongoing (interventions implemented as appropriate)    12/10/17 0601   Coping/Psychosocial Response Interventions   Plan Of Care Reviewed With patient   Patient Care Overview   Progress improving   Outcome Evaluation   Outcome Summary/Follow up Plan VSS. Neuro checks stable. Discharging later today.         Problem: Stroke (Ischemic) (Adult)  Goal: Signs and Symptoms of Listed Potential Problems Will be Absent or Manageable (Stroke)  Outcome: Ongoing (interventions implemented as appropriate)    Problem: Fall Risk (Adult)  Goal: Identify Related Risk Factors and Signs and Symptoms  Outcome: Ongoing (interventions implemented as appropriate)  Goal: Absence of Falls  Outcome: Ongoing (interventions implemented as appropriate)

## 2017-12-11 ENCOUNTER — HOSPITAL ENCOUNTER (OUTPATIENT)
Dept: CARDIOLOGY | Facility: HOSPITAL | Age: 82
Setting detail: RECURRING SERIES
Discharge: HOME | End: 2017-12-11

## 2017-12-11 PROCEDURE — 85610 PROTHROMBIN TIME: CPT

## 2017-12-11 PROCEDURE — 36416 COLLJ CAPILLARY BLOOD SPEC: CPT

## 2017-12-15 ENCOUNTER — HOSPITAL ENCOUNTER (OUTPATIENT)
Dept: CARDIOLOGY | Facility: HOSPITAL | Age: 82
Setting detail: RECURRING SERIES
Discharge: HOME | End: 2017-12-15

## 2017-12-15 ENCOUNTER — TELEPHONE (OUTPATIENT)
Dept: CARDIOLOGY | Facility: CLINIC | Age: 82
End: 2017-12-15

## 2017-12-15 PROCEDURE — 85610 PROTHROMBIN TIME: CPT

## 2017-12-15 PROCEDURE — 36416 COLLJ CAPILLARY BLOOD SPEC: CPT

## 2017-12-15 NOTE — TELEPHONE ENCOUNTER
Decrease ASA to 81mg QD. Petroleum jelly to nares BID, ocean nasal spray BID. Do not pick or over blow nose. Monitor weight and BP

## 2017-12-15 NOTE — TELEPHONE ENCOUNTER
MI out of office.     Patient was in today to have her INR checked.  She left a message for Dr. Veliz.  On 12/7/17 patient was admitted to [facility] for Dysarthria. She states she was started on 5 new medications.   mg daily, Carvedilol 3.125 mg bid, Clopidogrel Bisulfate 75 mg daily, Losartan Potassium 25mg daily. She states she has puffiness under her eyes.  She has had a nosebleed on both 12/13/17 and 12/14/17.  She is not eating much but lost 1 lb on Tuesday, Gained 1 pound on uesay nd 1 pound Thursday. Ankles are a little swollen.   Please advise.  / KOBI    Patient's phone number is [phone]

## 2017-12-18 ENCOUNTER — TRANSCRIBE ORDERS (OUTPATIENT)
Dept: ADMINISTRATIVE | Facility: HOSPITAL | Age: 82
End: 2017-12-18

## 2017-12-18 DIAGNOSIS — R47.02 DYSPHASIA: Primary | ICD-10-CM

## 2017-12-21 ENCOUNTER — HOSPITAL ENCOUNTER (OUTPATIENT)
Dept: GENERAL RADIOLOGY | Facility: HOSPITAL | Age: 82
Discharge: HOME | End: 2017-12-21
Admitting: INTERNAL MEDICINE

## 2017-12-21 DIAGNOSIS — R47.02 DYSPHASIA: ICD-10-CM

## 2017-12-21 PROCEDURE — G8997 SWALLOW GOAL STATUS: HCPCS

## 2017-12-21 PROCEDURE — 92611 MOTION FLUOROSCOPY/SWALLOW: CPT

## 2017-12-21 PROCEDURE — G8996 SWALLOW CURRENT STATUS: HCPCS

## 2017-12-21 PROCEDURE — G8998 SWALLOW D/C STATUS: HCPCS

## 2017-12-21 PROCEDURE — 74230 X-RAY XM SWLNG FUNCJ C+: CPT

## 2017-12-21 NOTE — MBS/VFSS/FEES
Outpatient Speech Language Pathology   Adult Swallow Initial Evaluation  [facility]     Patient Name: Caitlyn Longoria  : 1934  MRN: [id number]  Today's Date: 2017      SPEECH-LANGUAGE PATHOLOGY EVALUTION - VFSS  Subjective: The patient was seen on this date for a VFSS(Videofluoroscopic Swallowing Study).  Patient was alert and cooperative.    Significant history: Pt had Bedside Swallow Eval during recent hospitalization for CVA and was recommended for outpatient VFSS. Pt has been on a regular diet. Pt stated she has had choking episodes while eating typically weekly for about a year. Pt stated they have been better over the past couple of weeks. Pt denies having pneumonia. Pt stated recent dysarthria has improved since d/c from hospital earlier this month. Speech intelligibility WFL today.  Objective: Risks/benefits were reviewed with the patient, and consent was obtained. The study was completed with SLP present and Radiologist review. The patient was seen in lateral view(s). Textures given included thin liquid, puree consistency, mechanical soft consistency and regular consistency.  Assessment: Pt exhibited mild dysphagia characterized by tongue base weakness and mildly decreased pharyngeal peristalsis. Pt had inconsistent transient penetration with thins. No other penetration or aspiration noted with any consistency. Pt had piecemeal swallows with pooling after the swallow noted to the pyriforms with thin and to the valleculae with other textures. Pt cleared this with a spontaneous swallow. Pt exhibited good sensation and cleared all residuals (mild) independently.   The patient demonstrated silent penetration.  Noted with and thin liquid, however, this was transient and inconsistent.  Residue was mild.  Esophageal screen was performed and demonstrated delayed esophageal clearance.  SLP Findings: Patient presents with mild oropharyngeal dysphagia.   Comments: Pt's mouth was very dry. She stated  this has increased since starting a new med recently. Recommended ice chips or sips of water throughout the day.  Recommendations: Diet Textures: thin liquid, regular consistency food. Medications should be taken whole with thin liquids.   Recommended Strategies: Upright for PO, small bites and sips, double swallow with all, may use straw and alternate liquids and solids. Oral care before breakfast, after all meals and PRN.     Dysphagia therapy is not recommended. Rationale: Pt is safe on regular diet. Education completed on strategies with written info provided.       Visit Date: 12/21/2017   Patient Active Problem List   Diagnosis   • Neck and shoulder pain   • Arthropathy of shoulder region   • Carpal tunnel syndrome of left wrist   • Chronic pain of both shoulders   • Chronic left shoulder pain   • Arthropathy of left shoulder   • Dysarthria   • DM type 2 (diabetes mellitus, type 2)   • Atrial fibrillation   • HLD (hyperlipidemia)   • Bronchitis   • CAD (coronary artery disease), hx of stent   • CVA (cerebral vascular accident)        Past Medical History:   Diagnosis Date   • Abdominal pain    • Cough    • Diarrhea    • Difficulty breathing    • Dizziness    • DM type 2 (diabetes mellitus, type 2)         Past Surgical History:   Procedure Laterality Date   • CARDIAC CATHETERIZATION     • CHOLECYSTECTOMY     • CORONARY STENT PLACEMENT     • HERNIA REPAIR     • HYSTERECTOMY     • PACEMAKER IMPLANTATION     • REPLACEMENT TOTAL KNEE           Visit Dx:     ICD-10-CM ICD-9-CM   1. Dysphasia R47.02 784.59                   Adult Dysphagia - 12/21/17 1300     SLP Communication to Radiology    Summary Statement Pt presented with thin, pureed, mech soft, and regular. Pt exhibited mild dysphagia characterized by tongue base weakness and mildly decreased pharyngeal peristalsis. Pt had inconsistent transient penetration with thins. No other penetration or aspiration noted with any consistency. Pt had piecemeal swallows  with pooling after the swallow noted to the pyriforms with thin and to the valleculae with other textures. Pt cleared this with a spontaneous swallow. Pt exhibited good sensation and cleared all residuals (mild) independently.  -AW      User Key  (r) = Recorded By, (t) = Taken By, (c) = Cosigned By    Initials Name Provider Type    MANAS Hinton MS CCC-SLP Speech and Language Pathologist                            OP SLP Education       12/21/17 6929    Education    Barriers to Learning No barriers identified  -AW    Assessed Learning needs;Learning motivation;Learning preferences;Learning readiness  -AW    Learning Motivation Strong  -AW    Learning Method Explanation;Written materials  -AW    Teaching Response Verbalized understanding  -AW      User Key  (r) = Recorded By, (t) = Taken By, (c) = Cosigned By    Initials Name Effective Dates    MANAS Hinton MS MANFRED-SLP 04/13/15 -                     OP SLP Assessment/Plan - 12/21/17 1339     SLP Assessment    Functional Problems Swallowing  -AW    Clinical Impression: Swallowing Mild:;oropharyngeal phase dysphagia  -AW    Prognosis Good (comment)  -AW    Patient would benefit from skilled therapy intervention No  -AW      User Key  (r) = Recorded By, (t) = Taken By, (c) = Cosigned By    Initials Name Provider Type    MANAS Hinton MS CCC-SLP Speech and Language Pathologist              SLP Outcome Measures (last 72 hours)      SLP Outcome Measures       12/21/17 1300          SLP Outcome Measures    Outcome Measure Used? Adult NOMS  -AW      FCM Scores    FCM Chosen Swallowing  -AW      Swallowing FCM Score 7  -AW        User Key  (r) = Recorded By, (t) = Taken By, (c) = Cosigned By    Initials Name Effective Dates    MANAS Sonia Hinton MS MANFRED-SLP 04/13/15 -                Time Calculation:   SLP Start Time: 0930  SLP Stop Time: 1030  SLP Time Calculation (min): 60 min    Therapy Charges for Today     Code Description Service Date Service Provider Modifiers Qty     [id number] [facility] MOTION FLUORO EVAL SWALLOW 4 12/21/2017 Sonia Hinton, MS CCC-SLP GN 1                   Sonia Hinton MS CCC-SLP  12/21/2017

## 2017-12-22 ENCOUNTER — HOSPITAL ENCOUNTER (OUTPATIENT)
Dept: CARDIOLOGY | Facility: HOSPITAL | Age: 82
Setting detail: RECURRING SERIES
Discharge: HOME | End: 2017-12-22

## 2017-12-22 PROCEDURE — 36416 COLLJ CAPILLARY BLOOD SPEC: CPT | Performed by: INTERNAL MEDICINE

## 2017-12-22 PROCEDURE — 85610 PROTHROMBIN TIME: CPT | Performed by: INTERNAL MEDICINE

## 2017-12-26 RX ORDER — LOSARTAN POTASSIUM 25 MG/1
25 TABLET ORAL DAILY
Qty: 90 TABLET | Refills: 0 | Status: SHIPPED | OUTPATIENT
Start: 2017-12-26 | End: 2018-01-19 | Stop reason: HOSPADM

## 2017-12-26 RX ORDER — CARVEDILOL 3.12 MG/1
3.12 TABLET ORAL EVERY 12 HOURS SCHEDULED
Qty: 180 TABLET | Refills: 0 | Status: SHIPPED | OUTPATIENT
Start: 2017-12-26 | End: 2018-01-25

## 2018-01-05 ENCOUNTER — HOSPITAL ENCOUNTER (OUTPATIENT)
Dept: CARDIOLOGY | Facility: HOSPITAL | Age: 83
Setting detail: RECURRING SERIES
Discharge: HOME | End: 2018-01-05

## 2018-01-05 PROCEDURE — 85610 PROTHROMBIN TIME: CPT

## 2018-01-05 PROCEDURE — 36416 COLLJ CAPILLARY BLOOD SPEC: CPT

## 2018-01-11 ENCOUNTER — TELEPHONE (OUTPATIENT)
Dept: ORTHOPEDIC SURGERY | Facility: CLINIC | Age: 83
End: 2018-01-11

## 2018-01-11 ENCOUNTER — HOSPITAL ENCOUNTER (OUTPATIENT)
Dept: CARDIOLOGY | Facility: HOSPITAL | Age: 83
Setting detail: RECURRING SERIES
Discharge: HOME | End: 2018-01-11

## 2018-01-11 PROCEDURE — 36416 COLLJ CAPILLARY BLOOD SPEC: CPT

## 2018-01-11 PROCEDURE — 85610 PROTHROMBIN TIME: CPT

## 2018-01-11 NOTE — TELEPHONE ENCOUNTER
"Patient called stating she had sepsis of right TKA in . She takes Doxycycline 100mg, si po qd \"for life\" per RBB. Last night her knee became red and hot. Not as hot today but is weak to walk on it. She doubled her dose of Doxycycline. Says RBB told her to call if ever had any symptoms.   "

## 2018-01-11 NOTE — TELEPHONE ENCOUNTER
Called patient to come in tomorrow and she says she cannot drive in the snow/ice. She has 2 other doctor appt.'s next Tuesday, morning and afternoon. Says she can be here in 15 minutes. Please advise.

## 2018-01-12 NOTE — TELEPHONE ENCOUNTER
I just spoke with Miya.  She feels that overall her knee pain is improving.  I explained if she has any fever chills worsening knee pain she needs to be seen in the emergency department and not to wait till 1/18.  I explained her that I could see her before then but she is unable to come in until that point.  She does understand that if she has infection the longer she waits the more difficult it would be to treat in the more harmful it would be to her body.

## 2018-01-12 NOTE — TELEPHONE ENCOUNTER
"Called patient and she says she absolutely cannot drive in the weather today. \"I can barely walk\". Please advise.  "

## 2018-01-14 ENCOUNTER — APPOINTMENT (OUTPATIENT)
Dept: GENERAL RADIOLOGY | Facility: HOSPITAL | Age: 83
End: 2018-01-14

## 2018-01-14 ENCOUNTER — HOSPITAL ENCOUNTER (INPATIENT)
Facility: HOSPITAL | Age: 83
LOS: 5 days | Discharge: NURSING, REHAB OR LONG-TERM CARE | End: 2018-01-19
Attending: EMERGENCY MEDICINE | Admitting: INTERNAL MEDICINE

## 2018-01-14 DIAGNOSIS — R26.2 DIFFICULTY WALKING: ICD-10-CM

## 2018-01-14 DIAGNOSIS — I48.0 PAROXYSMAL ATRIAL FIBRILLATION (HCC): ICD-10-CM

## 2018-01-14 DIAGNOSIS — M00.9 INFECTION OF RIGHT KNEE (HCC): Primary | ICD-10-CM

## 2018-01-14 DIAGNOSIS — M25.461 KNEE EFFUSION, RIGHT: ICD-10-CM

## 2018-01-14 PROBLEM — I10 HTN (HYPERTENSION): Status: ACTIVE | Noted: 2018-01-14

## 2018-01-14 PROBLEM — N18.30 CKD (CHRONIC KIDNEY DISEASE) STAGE 3, GFR 30-59 ML/MIN (HCC): Status: ACTIVE | Noted: 2018-01-14

## 2018-01-14 LAB
ALBUMIN SERPL-MCNC: 3.7 G/DL (ref 3.5–5.2)
ALBUMIN/GLOB SERPL: 1.3 G/DL
ALP SERPL-CCNC: 89 U/L (ref 39–117)
ALT SERPL W P-5'-P-CCNC: 18 U/L (ref 1–33)
ANION GAP SERPL CALCULATED.3IONS-SCNC: 13.9 MMOL/L
AST SERPL-CCNC: 20 U/L (ref 1–32)
BASOPHILS # BLD AUTO: 0.02 10*3/MM3 (ref 0–0.2)
BASOPHILS NFR BLD AUTO: 0.3 % (ref 0–1.5)
BILIRUB SERPL-MCNC: 0.9 MG/DL (ref 0.1–1.2)
BUN BLD-MCNC: 26 MG/DL (ref 8–23)
BUN/CREAT SERPL: 25.5 (ref 7–25)
CALCIUM SPEC-SCNC: 8.8 MG/DL (ref 8.6–10.5)
CHLORIDE SERPL-SCNC: 99 MMOL/L (ref 98–107)
CO2 SERPL-SCNC: 27.1 MMOL/L (ref 22–29)
CREAT BLD-MCNC: 1.02 MG/DL (ref 0.57–1)
CRP SERPL-MCNC: 1.99 MG/DL (ref 0–0.5)
D-LACTATE SERPL-SCNC: 1.5 MMOL/L (ref 0.5–2)
DEPRECATED RDW RBC AUTO: 55.9 FL (ref 37–54)
EOSINOPHIL # BLD AUTO: 0.08 10*3/MM3 (ref 0–0.7)
EOSINOPHIL NFR BLD AUTO: 1.1 % (ref 0.3–6.2)
ERYTHROCYTE [DISTWIDTH] IN BLOOD BY AUTOMATED COUNT: 15.4 % (ref 11.7–13)
ERYTHROCYTE [SEDIMENTATION RATE] IN BLOOD: 34 MM/HR (ref 0–30)
GFR SERPL CREATININE-BSD FRML MDRD: 52 ML/MIN/1.73
GLOBULIN UR ELPH-MCNC: 2.9 GM/DL
GLUCOSE BLD-MCNC: 122 MG/DL (ref 65–99)
GLUCOSE BLDC GLUCOMTR-MCNC: 105 MG/DL (ref 70–130)
HCT VFR BLD AUTO: 37.4 % (ref 35.6–45.5)
HGB BLD-MCNC: 12.1 G/DL (ref 11.9–15.5)
IMM GRANULOCYTES # BLD: 0.03 10*3/MM3 (ref 0–0.03)
IMM GRANULOCYTES NFR BLD: 0.4 % (ref 0–0.5)
INR PPP: 2.32 (ref 0.9–1.1)
LYMPHOCYTES # BLD AUTO: 3.74 10*3/MM3 (ref 0.9–4.8)
LYMPHOCYTES NFR BLD AUTO: 50.9 % (ref 19.6–45.3)
MCH RBC QN AUTO: 32.3 PG (ref 26.9–32)
MCHC RBC AUTO-ENTMCNC: 32.4 G/DL (ref 32.4–36.3)
MCV RBC AUTO: 99.7 FL (ref 80.5–98.2)
MONOCYTES # BLD AUTO: 0.19 10*3/MM3 (ref 0.2–1.2)
MONOCYTES NFR BLD AUTO: 2.6 % (ref 5–12)
NEUTROPHILS # BLD AUTO: 3.29 10*3/MM3 (ref 1.9–8.1)
NEUTROPHILS NFR BLD AUTO: 44.7 % (ref 42.7–76)
PLATELET # BLD AUTO: 185 10*3/MM3 (ref 140–500)
PMV BLD AUTO: 10.4 FL (ref 6–12)
POTASSIUM BLD-SCNC: 3.9 MMOL/L (ref 3.5–5.2)
PROT SERPL-MCNC: 6.6 G/DL (ref 6–8.5)
PROTHROMBIN TIME: 24.7 SECONDS (ref 11.7–14.2)
RBC # BLD AUTO: 3.75 10*6/MM3 (ref 3.9–5.2)
SODIUM BLD-SCNC: 140 MMOL/L (ref 136–145)
WBC NRBC COR # BLD: 7.35 10*3/MM3 (ref 4.5–10.7)

## 2018-01-14 PROCEDURE — 85610 PROTHROMBIN TIME: CPT | Performed by: EMERGENCY MEDICINE

## 2018-01-14 PROCEDURE — 86140 C-REACTIVE PROTEIN: CPT | Performed by: EMERGENCY MEDICINE

## 2018-01-14 PROCEDURE — 85652 RBC SED RATE AUTOMATED: CPT | Performed by: EMERGENCY MEDICINE

## 2018-01-14 PROCEDURE — 25010000002 PIPERACILLIN SOD-TAZOBACTAM PER 1 G: Performed by: EMERGENCY MEDICINE

## 2018-01-14 PROCEDURE — 93010 ELECTROCARDIOGRAM REPORT: CPT | Performed by: INTERNAL MEDICINE

## 2018-01-14 PROCEDURE — 71046 X-RAY EXAM CHEST 2 VIEWS: CPT

## 2018-01-14 PROCEDURE — 82962 GLUCOSE BLOOD TEST: CPT

## 2018-01-14 PROCEDURE — 25010000002 VANCOMYCIN 10 G RECONSTITUTED SOLUTION: Performed by: EMERGENCY MEDICINE

## 2018-01-14 PROCEDURE — 85025 COMPLETE CBC W/AUTO DIFF WBC: CPT | Performed by: EMERGENCY MEDICINE

## 2018-01-14 PROCEDURE — 83605 ASSAY OF LACTIC ACID: CPT | Performed by: EMERGENCY MEDICINE

## 2018-01-14 PROCEDURE — 73560 X-RAY EXAM OF KNEE 1 OR 2: CPT

## 2018-01-14 PROCEDURE — 80053 COMPREHEN METABOLIC PANEL: CPT | Performed by: EMERGENCY MEDICINE

## 2018-01-14 PROCEDURE — 93005 ELECTROCARDIOGRAM TRACING: CPT | Performed by: INTERNAL MEDICINE

## 2018-01-14 PROCEDURE — 99283 EMERGENCY DEPT VISIT LOW MDM: CPT

## 2018-01-14 PROCEDURE — 87040 BLOOD CULTURE FOR BACTERIA: CPT | Performed by: EMERGENCY MEDICINE

## 2018-01-14 RX ORDER — CLOTRIMAZOLE AND BETAMETHASONE DIPROPIONATE 10; .64 MG/G; MG/G
1 CREAM TOPICAL 2 TIMES DAILY PRN
COMMUNITY
End: 2019-09-16

## 2018-01-14 RX ORDER — ONDANSETRON 2 MG/ML
4 INJECTION INTRAMUSCULAR; INTRAVENOUS EVERY 6 HOURS PRN
Status: DISCONTINUED | OUTPATIENT
Start: 2018-01-14 | End: 2018-01-19 | Stop reason: HOSPADM

## 2018-01-14 RX ORDER — RANITIDINE 300 MG/1
300 TABLET ORAL NIGHTLY
COMMUNITY
Start: 2017-12-04 | End: 2018-05-22 | Stop reason: ALTCHOICE

## 2018-01-14 RX ORDER — ACETAMINOPHEN 500 MG
500 TABLET ORAL EVERY 6 HOURS PRN
COMMUNITY
End: 2019-09-16

## 2018-01-14 RX ORDER — AMITRIPTYLINE HYDROCHLORIDE 25 MG/1
25 TABLET, FILM COATED ORAL NIGHTLY
Status: DISCONTINUED | OUTPATIENT
Start: 2018-01-14 | End: 2018-01-19 | Stop reason: HOSPADM

## 2018-01-14 RX ORDER — LOPERAMIDE HYDROCHLORIDE 2 MG/1
2 CAPSULE ORAL DAILY
COMMUNITY
End: 2020-02-13 | Stop reason: HOSPADM

## 2018-01-14 RX ORDER — SACCHAROMYCES BOULARDII 250 MG
250 CAPSULE ORAL 2 TIMES DAILY
Status: DISCONTINUED | OUTPATIENT
Start: 2018-01-14 | End: 2018-01-19 | Stop reason: HOSPADM

## 2018-01-14 RX ORDER — AMITRIPTYLINE HYDROCHLORIDE 25 MG/1
25 TABLET, FILM COATED ORAL NIGHTLY
Status: ON HOLD | COMMUNITY
End: 2018-04-28

## 2018-01-14 RX ORDER — CLOBETASOL PROPIONATE 0.46 MG/ML
1 SOLUTION TOPICAL NIGHTLY PRN
COMMUNITY
End: 2018-03-12

## 2018-01-14 RX ORDER — NICOTINE POLACRILEX 4 MG
15 LOZENGE BUCCAL
Status: DISCONTINUED | OUTPATIENT
Start: 2018-01-14 | End: 2018-01-19 | Stop reason: HOSPADM

## 2018-01-14 RX ORDER — SODIUM CHLORIDE 0.9 % (FLUSH) 0.9 %
10 SYRINGE (ML) INJECTION AS NEEDED
Status: DISCONTINUED | OUTPATIENT
Start: 2018-01-14 | End: 2018-01-15

## 2018-01-14 RX ORDER — SACCHAROMYCES BOULARDII 250 MG
250 CAPSULE ORAL 2 TIMES DAILY
COMMUNITY
End: 2018-03-12

## 2018-01-14 RX ORDER — LOPERAMIDE HYDROCHLORIDE 2 MG/1
2 CAPSULE ORAL DAILY
Status: DISCONTINUED | OUTPATIENT
Start: 2018-01-14 | End: 2018-01-19 | Stop reason: HOSPADM

## 2018-01-14 RX ORDER — ONDANSETRON 4 MG/1
4 TABLET, FILM COATED ORAL EVERY 6 HOURS PRN
Status: DISCONTINUED | OUTPATIENT
Start: 2018-01-14 | End: 2018-01-19 | Stop reason: HOSPADM

## 2018-01-14 RX ORDER — DEXTROSE MONOHYDRATE 25 G/50ML
25 INJECTION, SOLUTION INTRAVENOUS
Status: DISCONTINUED | OUTPATIENT
Start: 2018-01-14 | End: 2018-01-19 | Stop reason: HOSPADM

## 2018-01-14 RX ORDER — MORPHINE SULFATE 2 MG/ML
2 INJECTION, SOLUTION INTRAMUSCULAR; INTRAVENOUS
Status: DISCONTINUED | OUTPATIENT
Start: 2018-01-14 | End: 2018-01-18

## 2018-01-14 RX ORDER — CHLORAL HYDRATE 500 MG
1000 CAPSULE ORAL 2 TIMES DAILY WITH MEALS
COMMUNITY
End: 2020-02-13 | Stop reason: HOSPADM

## 2018-01-14 RX ORDER — ONDANSETRON 4 MG/1
4 TABLET, ORALLY DISINTEGRATING ORAL EVERY 6 HOURS PRN
Status: DISCONTINUED | OUTPATIENT
Start: 2018-01-14 | End: 2018-01-19 | Stop reason: HOSPADM

## 2018-01-14 RX ORDER — CLOBETASOL PROPIONATE 0.5 MG/G
1 CREAM TOPICAL 2 TIMES DAILY
COMMUNITY
End: 2018-01-14 | Stop reason: SDUPTHER

## 2018-01-14 RX ORDER — BACLOFEN 10 MG/1
10 TABLET ORAL 3 TIMES DAILY PRN
COMMUNITY
Start: 2017-12-04 | End: 2018-08-28 | Stop reason: ALTCHOICE

## 2018-01-14 RX ORDER — SALIVA STIMULANT COMB. NO.7
GEL (GRAM) MUCOUS MEMBRANE AS NEEDED
COMMUNITY
End: 2019-09-16

## 2018-01-14 RX ORDER — ASPIRIN 81 MG/1
81 TABLET, CHEWABLE ORAL DAILY
COMMUNITY
End: 2019-02-05

## 2018-01-14 RX ORDER — CARVEDILOL 3.12 MG/1
3.12 TABLET ORAL EVERY 12 HOURS SCHEDULED
Status: DISCONTINUED | OUTPATIENT
Start: 2018-01-14 | End: 2018-01-19 | Stop reason: HOSPADM

## 2018-01-14 RX ORDER — ROSUVASTATIN CALCIUM 20 MG/1
20 TABLET, COATED ORAL DAILY
Status: DISCONTINUED | OUTPATIENT
Start: 2018-01-14 | End: 2018-01-19 | Stop reason: HOSPADM

## 2018-01-14 RX ORDER — FAMOTIDINE 40 MG/1
40 TABLET, FILM COATED ORAL NIGHTLY
Status: DISCONTINUED | OUTPATIENT
Start: 2018-01-14 | End: 2018-01-19 | Stop reason: HOSPADM

## 2018-01-14 RX ORDER — OXYBUTYNIN CHLORIDE 5 MG/1
10 TABLET, EXTENDED RELEASE ORAL DAILY
Status: DISCONTINUED | OUTPATIENT
Start: 2018-01-14 | End: 2018-01-19 | Stop reason: HOSPADM

## 2018-01-14 RX ORDER — DOXYCYCLINE HYCLATE 100 MG
100 TABLET ORAL
COMMUNITY
Start: 2017-12-12 | End: 2018-01-19 | Stop reason: HOSPADM

## 2018-01-14 RX ORDER — BUMETANIDE 1 MG/1
1 TABLET ORAL EVERY MORNING
Status: DISCONTINUED | OUTPATIENT
Start: 2018-01-15 | End: 2018-01-15

## 2018-01-14 RX ORDER — LOSARTAN POTASSIUM 25 MG/1
25 TABLET ORAL DAILY
Status: DISCONTINUED | OUTPATIENT
Start: 2018-01-14 | End: 2018-01-15

## 2018-01-14 RX ORDER — SODIUM CHLORIDE 0.9 % (FLUSH) 0.9 %
1-10 SYRINGE (ML) INJECTION AS NEEDED
Status: DISCONTINUED | OUTPATIENT
Start: 2018-01-14 | End: 2018-01-15

## 2018-01-14 RX ORDER — PANTOPRAZOLE SODIUM 40 MG/1
40 TABLET, DELAYED RELEASE ORAL 2 TIMES DAILY WITH MEALS
Status: DISCONTINUED | OUTPATIENT
Start: 2018-01-14 | End: 2018-01-19 | Stop reason: HOSPADM

## 2018-01-14 RX ORDER — FLUTICASONE PROPIONATE 50 MCG
2 SPRAY, SUSPENSION (ML) NASAL DAILY
Status: DISCONTINUED | OUTPATIENT
Start: 2018-01-14 | End: 2018-01-19 | Stop reason: HOSPADM

## 2018-01-14 RX ORDER — NALOXONE HCL 0.4 MG/ML
0.4 VIAL (ML) INJECTION
Status: DISCONTINUED | OUTPATIENT
Start: 2018-01-14 | End: 2018-01-18

## 2018-01-14 RX ORDER — ACETYLCYSTEINE 600 MG
600 CAPSULE ORAL
COMMUNITY
End: 2018-03-12

## 2018-01-14 RX ORDER — OXAZEPAM 10 MG
10 CAPSULE ORAL NIGHTLY
Status: DISCONTINUED | OUTPATIENT
Start: 2018-01-14 | End: 2018-01-19 | Stop reason: HOSPADM

## 2018-01-14 RX ORDER — KETOCONAZOLE 20 MG/ML
SHAMPOO TOPICAL 2 TIMES WEEKLY
COMMUNITY
End: 2019-09-16

## 2018-01-14 RX ADMIN — OXYBUTYNIN CHLORIDE 10 MG: 5 TABLET, EXTENDED RELEASE ORAL at 23:18

## 2018-01-14 RX ADMIN — PANTOPRAZOLE SODIUM 40 MG: 40 TABLET, DELAYED RELEASE ORAL at 19:12

## 2018-01-14 RX ADMIN — AMITRIPTYLINE HYDROCHLORIDE 25 MG: 25 TABLET, FILM COATED ORAL at 23:18

## 2018-01-14 RX ADMIN — OXAZEPAM 10 MG: 10 CAPSULE, GELATIN COATED ORAL at 23:17

## 2018-01-14 RX ADMIN — ROSUVASTATIN CALCIUM 20 MG: 20 TABLET, FILM COATED ORAL at 23:18

## 2018-01-14 RX ADMIN — Medication 250 MG: at 23:18

## 2018-01-14 RX ADMIN — VANCOMYCIN HYDROCHLORIDE 1750 MG: 10 INJECTION, POWDER, LYOPHILIZED, FOR SOLUTION INTRAVENOUS at 15:29

## 2018-01-14 RX ADMIN — TAZOBACTAM SODIUM AND PIPERACILLIN SODIUM 3.38 G: 375; 3 INJECTION, SOLUTION INTRAVENOUS at 19:04

## 2018-01-14 RX ADMIN — CARVEDILOL 3.12 MG: 3.12 TABLET, FILM COATED ORAL at 23:17

## 2018-01-14 RX ADMIN — FAMOTIDINE 40 MG: 40 TABLET, FILM COATED ORAL at 23:17

## 2018-01-14 NOTE — ED NOTES
Pt.'s right knee is edematous and warm to the touch.     Jose Daniel Uribe, JEANNINE  01/14/18 8715

## 2018-01-14 NOTE — H&P
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  She presented to the emergency room today with complaints of right knee pain, swelling, redness, and warmth that has been ongoing for the past 4 days.  She has been in contact with her orthopedic surgeon and was going to be seen in the office 2 days ago but could not make it due to the inclement weather.  Her knee was feeling worse and she came on into the emergency room today to be seen.  She states that the swelling redness and warmth are somewhat improved today but her pain is worse.  She denies any fevers or chills.  No other associated symptoms and states she is otherwise feeling well.  She states that she initially had right total knee arthroplasty in 2002.  In 2014 she had septic arthritis of this knee requiring washout.  She was having dysarthria month ago when she had the stroke but this has resolved and she has had no other neurologic changes since that time.  She was discharged on Coumadin which was a prior medication for afib and full dose aspirin.  She did not tolerate Plavix due to bleeding and states that she had to cut her aspirin back to 81 mg after discharge due to nosebleeds.  In the emergency room there was concern for a septic right knee and I was asked to admit her for further management and coordination of her care.    Past Medical History:   Diagnosis Date   • Abdominal pain    • Cough    • Diarrhea    • Difficulty breathing    • Dizziness    • DM type 2  (diabetes mellitus, type 2)      Past Surgical History:   Procedure Laterality Date   • CARDIAC CATHETERIZATION     • CHOLECYSTECTOMY     • CORONARY STENT PLACEMENT     • HERNIA REPAIR     • HYSTERECTOMY     • PACEMAKER IMPLANTATION     • REPLACEMENT TOTAL KNEE       History reviewed. No pertinent family history.  Social History   Substance Use Topics   • Smoking status: Never Smoker   • Smokeless tobacco: Never Used   • Alcohol use No     Medications reviewed    Allergies:  Ahist [chlorpheniramine]; Amoxicillin; Clarithromycin; Diclofenac; Diphenhydramine; Esomeprazole; Ibuprofen; Levalbuterol; Levocetirizine; Lipitor [atorvastatin]; Lodine [etodolac]; Omeprazole; Pravachol [pravastatin]; Quinapril; Sulfa antibiotics; Sulindac; Valdecoxib; and Prednisone    Review of Systems   Constitutional: Negative for chills and fever.   HENT: Negative for congestion and sore throat.    Eyes: Negative for visual disturbance.   Respiratory: Negative for cough, chest tightness, shortness of breath and wheezing.    Cardiovascular: Negative for chest pain, palpitations and leg swelling.   Gastrointestinal: Negative for abdominal distention, abdominal pain, diarrhea, nausea and vomiting.   Endocrine: Negative for polydipsia and polyuria.   Genitourinary: Negative for difficulty urinating, dysuria, frequency and urgency.   Musculoskeletal: Positive for arthralgias and joint swelling. Negative for myalgias.        Right knee redness, swelling, warmth, and pain   Skin: Negative for color change and rash.   Neurological: Negative for dizziness and light-headedness.        PHYSICAL EXAM    Vital Signs  tMax 24 hrs:  Temp (24hrs), Av.7 °F (36.5 °C), Min:97.7 °F (36.5 °C), Max:97.7 °F (36.5 °C)    Vitals Ranges:  Temp:  [97.7 °F (36.5 °C)] 97.7 °F (36.5 °C)  Heart Rate:  [80-82] 80  Resp:  [16] 16  BP: (105-107)/(60-69) 105/60    Physical Exam   Constitutional: She is oriented to person, place, and time. She appears well-developed and  well-nourished.   HENT:   Head: Normocephalic and atraumatic.   Eyes: EOM are normal. Pupils are equal, round, and reactive to light.   Neck: Neck supple. No tracheal deviation present.   Cardiovascular: Normal rate and regular rhythm.  Exam reveals no gallop.    Murmur heard.  Pulmonary/Chest: Effort normal. No respiratory distress. She has no wheezes.   Abdominal: Soft. Bowel sounds are normal. She exhibits no distension. There is no tenderness.   Musculoskeletal: She exhibits edema and tenderness.   The right knee is mildly erythematous and warm to the touch.  She has edema surrounding the joint and pain with range of motion.   Neurological: She is alert and oriented to person, place, and time. No cranial nerve deficit.   Skin: Skin is warm and dry.   She has scattered bruises throughout the extremities.  Most prominent is a new, moderate size ecchymosis over the left shin   Nursing note and vitals reviewed.      Results Review:    Results from last 7 days  Lab Units 01/14/18  1338   WBC 10*3/mm3 7.35   HEMOGLOBIN g/dL 12.1   HEMATOCRIT % 37.4   PLATELETS 10*3/mm3 185       Results from last 7 days  Lab Units 01/14/18  1338   SODIUM mmol/L 140   POTASSIUM mmol/L 3.9   CHLORIDE mmol/L 99   CO2 mmol/L 27.1   BUN mg/dL 26*   CREATININE mg/dL 1.02*   CALCIUM mg/dL 8.8   BILIRUBIN mg/dL 0.9   ALK PHOS U/L 89   ALT (SGPT) U/L 18   AST (SGOT) U/L 20   GLUCOSE mg/dL 122*     Right knee x-ray:  1. There is moderate joint effusion.  2. Right knee arthroplasty is intact.  3. Diffuse soft tissue calcification suggests previous inflammatory  change possible previous hemorrhage.  4. Prominent spurring at the superior and inferior aspect patella, no  acute osseous abnormality.       I reviewed the patient's new clinical results.  I reviewed the patient's new imaging results and agree with the interpretation.      Principal Problem:    Infection of right knee  Active Problems:    DM type 2 (diabetes mellitus, type 2)    Atrial  fibrillation    CAD (coronary artery disease), hx of stent    CVA (cerebral vascular accident)    HTN (hypertension)    CKD (chronic kidney disease) stage 3, GFR 30-59 ml/min  Chronic systolic congestive heart failure    Assessment & Plan    The patient will be admitted.  She has been started on vancomycin and Zosyn for treatment of right septic knee joint.  Orthopedic surgery will see her in consultation.  We'll provide supportive care with pain medication while she awaits their evaluation.  Her INR is currently therapeutic.  I am concerned for her risk of perioperative stroke given her recent history.  I will ask neurology to reevaluate her and assist in her perioperative management.  Coumadin will be placed on hold for now but if neurology feels she is of high risk then may need bridging with heparin.  Her daughter has requested preoperative cardiology evaluation.  At the present time she denies chest pain.  She shows no signs of decompensated heart failure.  Will obtain EKG.  Additional plans based on her clinical course.    I discussed the patients findings and my recommendations with patient and family    Jv Joseph MD  01/14/18  4:29 PM

## 2018-01-14 NOTE — ED PROVIDER NOTES
" EMERGENCY DEPARTMENT ENCOUNTER    CHIEF COMPLAINT  Chief Complaint: Knee pain  History given by: Patient  History limited by: None  Room Number: 47/47  PMD: Zenaida Suarez MD      HPI:  Pt is a 83 y.o. female who presents complaining of right knee pain onset 5 days ago. Pt states she had a septic knee in 2014. Pt couldn't make her apt Friday 1/12/2018 and was told to come into ER when she could not put any weight on her knee. Pt states knee is swollen, painful and erythematous. Pt denies fever, chills, nausea, vomiting and new SOB.    Duration: 5 days  Onset: Gradual  Timing: Constant  Location: Right knee  Radiation: None  Quality: \"pain\"  Intensity/Severity: Moderate  Progression: Worsening  Associated Symptoms: Pt states knee is swollen, painful and erythematous  Aggravating Factors: Bearing weight  Alleviating Factors: None  Previous Episodes: Septic right knee in 2014  Treatment before arrival: None    PAST MEDICAL HISTORY  Active Ambulatory Problems     Diagnosis Date Noted   • Neck and shoulder pain 03/24/2017   • Arthropathy of shoulder region 03/24/2017   • Carpal tunnel syndrome of left wrist 03/24/2017   • Chronic pain of both shoulders 06/27/2017   • Chronic left shoulder pain 12/05/2017   • Arthropathy of left shoulder 12/05/2017   • Dysarthria 12/07/2017   • DM type 2 (diabetes mellitus, type 2) 12/07/2017   • Atrial fibrillation 12/07/2017   • HLD (hyperlipidemia) 12/07/2017   • Bronchitis 12/07/2017   • CAD (coronary artery disease), hx of stent 12/07/2017   • CVA (cerebral vascular accident) 12/10/2017     Resolved Ambulatory Problems     Diagnosis Date Noted   • No Resolved Ambulatory Problems     Past Medical History:   Diagnosis Date   • Abdominal pain    • Cough    • Diarrhea    • Difficulty breathing    • Dizziness    • DM type 2 (diabetes mellitus, type 2)        PAST SURGICAL HISTORY  Past Surgical History:   Procedure Laterality Date   • CARDIAC CATHETERIZATION     • " CHOLECYSTECTOMY     • CORONARY STENT PLACEMENT     • HERNIA REPAIR     • HYSTERECTOMY     • PACEMAKER IMPLANTATION     • REPLACEMENT TOTAL KNEE         FAMILY HISTORY  History reviewed. No pertinent family history.    SOCIAL HISTORY  Social History     Social History   • Marital status: Single     Spouse name: N/A   • Number of children: N/A   • Years of education: N/A     Occupational History   • Not on file.     Social History Main Topics   • Smoking status: Never Smoker   • Smokeless tobacco: Never Used   • Alcohol use No   • Drug use: No   • Sexual activity: Not on file     Other Topics Concern   • Not on file     Social History Narrative       ALLERGIES  Ahist [chlorpheniramine]; Amoxicillin; Clarithromycin; Diclofenac; Diphenhydramine; Esomeprazole; Ibuprofen; Levalbuterol; Levocetirizine; Lipitor [atorvastatin]; Lodine [etodolac]; Omeprazole; Pravachol [pravastatin]; Quinapril; Sulfa antibiotics; Sulindac; Valdecoxib; and Prednisone    REVIEW OF SYSTEMS  Review of Systems   Constitutional: Negative for chills and fever.   HENT: Negative for sore throat and trouble swallowing.    Eyes: Negative for visual disturbance.   Respiratory: Negative for cough and shortness of breath (No new episodes of SOB).    Cardiovascular: Negative for chest pain, palpitations and leg swelling.   Gastrointestinal: Negative for abdominal pain, diarrhea, nausea and vomiting.   Endocrine: Negative.    Genitourinary: Negative for decreased urine volume, dysuria and frequency.   Musculoskeletal: Negative for neck pain.        Right knee swollen and painful   Skin: Positive for color change (Redness on right knee). Negative for rash.   Allergic/Immunologic: Negative.    Neurological: Negative for syncope, weakness, numbness and headaches.   Hematological: Negative.    Psychiatric/Behavioral: Negative.    All other systems reviewed and are negative.      PHYSICAL EXAM  ED Triage Vitals   Temp Heart Rate Resp BP SpO2   01/14/18 1253  01/14/18 1253 01/14/18 1254 -- 01/14/18 1252   97.7 °F (36.5 °C) 82 16  96 %      Temp src Heart Rate Source Patient Position BP Location FiO2 (%)   01/14/18 1253 01/14/18 1253 -- -- --   Tympanic Monitor          Physical Exam   Constitutional: She is oriented to person, place, and time and well-developed, well-nourished, and in no distress. No distress.   HENT:   Head: Normocephalic and atraumatic.   Eyes: EOM are normal. Pupils are equal, round, and reactive to light.   Neck: Normal range of motion. Neck supple.   Cardiovascular: Normal rate, regular rhythm and normal heart sounds.    Pulmonary/Chest: Effort normal and breath sounds normal. No respiratory distress.   Abdominal: Soft. There is no tenderness. There is no rebound and no guarding.   Musculoskeletal: Normal range of motion. She exhibits tenderness. She exhibits no edema.   Right knee swollen, tender and warm   Neurological: She is alert and oriented to person, place, and time. She has normal sensation and normal strength.   Skin: Skin is warm and dry. No rash noted.   Psychiatric: Mood and affect normal.   Nursing note and vitals reviewed.      LAB RESULTS  Lab Results (last 24 hours)     Procedure Component Value Units Date/Time    CBC & Differential [[id number]] Collected:  01/14/18 1338    Specimen:  Blood Updated:  01/14/18 1358    Narrative:       The following orders were created for panel order CBC & Differential.  Procedure                               Abnormality         Status                     ---------                               -----------         ------                     CBC Auto Differential[[id number]]        Abnormal            Final result                 Please view results for these tests on the individual orders.    Comprehensive Metabolic Panel [[id number]]  (Abnormal) Collected:  01/14/18 1338    Specimen:  Blood Updated:  01/14/18 1418     Glucose 122 (H) mg/dL      BUN 26 (H) mg/dL      Creatinine 1.02 (H) mg/dL       Sodium 140 mmol/L      Potassium 3.9 mmol/L      Chloride 99 mmol/L      CO2 27.1 mmol/L      Calcium 8.8 mg/dL      Total Protein 6.6 g/dL      Albumin 3.70 g/dL      ALT (SGPT) 18 U/L      AST (SGOT) 20 U/L      Alkaline Phosphatase 89 U/L      Total Bilirubin 0.9 mg/dL      eGFR Non African Amer 52 (L) mL/min/1.73      Globulin 2.9 gm/dL      A/G Ratio 1.3 g/dL      BUN/Creatinine Ratio 25.5 (H)     Anion Gap 13.9 mmol/L     Narrative:       The MDRD GFR formula is only valid for adults with stable renal function between ages 18 and 70.    Protime-INR [[id number]]  (Abnormal) Collected:  01/14/18 1338    Specimen:  Blood Updated:  01/14/18 1407     Protime 24.7 (H) Seconds      INR 2.32 (H)    Blood Culture - Blood, [[id number]] Collected:  01/14/18 1338    Specimen:  Blood from Arm, Left Updated:  01/14/18 1349    Lactic Acid, Plasma [[id number]]  (Normal) Collected:  01/14/18 1338    Specimen:  Blood Updated:  01/14/18 1406     Lactate 1.5 mmol/L     Sedimentation Rate [[id number]]  (Abnormal) Collected:  01/14/18 1338    Specimen:  Blood Updated:  01/14/18 1423     Sed Rate 34 (H) mm/hr     C-reactive Protein [[id number]]  (Abnormal) Collected:  01/14/18 1338    Specimen:  Blood Updated:  01/14/18 1418     C-Reactive Protein 1.99 (H) mg/dL     CBC Auto Differential [[id number]]  (Abnormal) Collected:  01/14/18 1338    Specimen:  Blood Updated:  01/14/18 1358     WBC 7.35 10*3/mm3      RBC 3.75 (L) 10*6/mm3      Hemoglobin 12.1 g/dL      Hematocrit 37.4 %      MCV 99.7 (H) fL      MCH 32.3 (H) pg      MCHC 32.4 g/dL      RDW 15.4 (H) %      RDW-SD 55.9 (H) fl      MPV 10.4 fL      Platelets 185 10*3/mm3      Neutrophil % 44.7 %      Lymphocyte % 50.9 (H) %      Monocyte % 2.6 (L) %      Eosinophil % 1.1 %      Basophil % 0.3 %      Immature Grans % 0.4 %      Neutrophils, Absolute 3.29 10*3/mm3      Lymphocytes, Absolute 3.74 10*3/mm3      Monocytes, Absolute 0.19 (L) 10*3/mm3      Eosinophils, Absolute 0.08 10*3/mm3       Basophils, Absolute 0.02 10*3/mm3      Immature Grans, Absolute 0.03 10*3/mm3     Blood Culture - Blood, [[id number]] Collected:  01/14/18 1411    Specimen:  Blood from Arm, Left Updated:  01/14/18 1457          I ordered the above labs and reviewed the results    RADIOLOGY  XR Knee 1 or 2 View Right   Final Result   FINDINGS:  1. There is moderate joint effusion.  2. Right knee arthroplasty is intact.  3. Diffuse soft tissue calcification suggests previous inflammatory  change possible previous hemorrhage.  4. Prominent spurring at the superior and inferior aspect patella, no  acute osseous abnormality.        I ordered the above noted radiological studies. Interpreted by radiologist. Reviewed by me in PACS.       PROCEDURES  Procedures      PROGRESS AND CONSULTS  ED Course     1320  XR Knee and Labs ordered    1429  Consult placed to ortho    1510  Discussed pt's case with Dr. Ac who would like to have medicine admit the pt.     1512  Consult placed to [facility]; vancomycin ordered    1528  Received a call from Dr. Joseph and discussed pt results. Dr. Joseph who agrees to admit pt. He would like the pt to receive Zosyn as well.    1529  Zosyn ordered.         MEDICAL DECISION MAKING  Results were reviewed/discussed with the patient and they were also made aware of online access. Pt also made aware that some labs, such as cultures, will not be resulted during ER visit and follow up with PMD is necessary.     MDM  Number of Diagnoses or Management Options  Infection of right knee:   Knee effusion, right:      Amount and/or Complexity of Data Reviewed  Clinical lab tests: ordered and reviewed (WBC - 7.35  RBC- 3.75)  Tests in the radiology section of CPT®: ordered and reviewed (XR Knee   FINDINGS:  1. There is moderate joint effusion.  2. Right knee arthroplasty is intact.  3. Diffuse soft tissue calcification suggests previous inflammatory  change possible previous hemorrhage.  4. Prominent spurring at the  superior and inferior aspect patella, no  acute osseous abnormality.)  Discussion of test results with the performing providers: yes (Dr. Joseph)  Decide to obtain previous medical records or to obtain history from someone other than the patient: yes  Discuss the patient with other providers: yes (Dr. Ac)           DIAGNOSIS  Final diagnoses:   Infection of right knee   Knee effusion, right       DISPOSITION  ADMISSION    Discussed treatment plan and reason for admission with pt/family and admitting physician.  Pt/family voiced understanding of the plan for admission for further testing/treatment as needed.         Latest Documented Vital Signs:  As of 3:31 PM  BP- 105/60 HR- 80 Temp- 97.7 °F (36.5 °C) (Tympanic) O2 sat- 94%    --  Documentation assistance provided by javi Ward for Dr. Duarte.  Information recorded by the scribe was done at my direction and has been verified and validated by me.     Mason Saunders  01/14/18 5005       Yohan Duarte MD  01/14/18 5004

## 2018-01-14 NOTE — PROGRESS NOTES
Clinical Pharmacy Services: Medication History    Caitlyn Longoria is a 83 y.o. female presenting to [facility] for Infection of right knee [M00.9]    She  has a past medical history of Abdominal pain; Cough; Diarrhea; Difficulty breathing; Dizziness; and DM type 2 (diabetes mellitus, type 2).    Allergies as of 01/14/2018 - [place] as Reviewed 01/14/2018   Allergen Reaction Noted   • Ahist [chlorpheniramine] Nausea Only, Other (See Comments), and Dizziness 03/24/2017   • Amoxicillin Other (See Comments) 04/20/2016   • Clarithromycin Nausea Only 04/20/2016   • Diclofenac  04/20/2016   • Diphenhydramine  04/20/2016   • Esomeprazole  04/20/2016   • Ibuprofen Other (See Comments) 03/24/2017   • Levalbuterol Swelling 04/20/2016   • Levocetirizine Other (See Comments) 04/20/2016   • Lipitor [atorvastatin] Other (See Comments) 05/12/2016   • Lodine [etodolac]  03/24/2017   • Omeprazole Nausea Only 04/20/2016   • Pravachol [pravastatin] Nausea Only and Other (See Comments) 03/24/2017   • Quinapril Swelling and Confusion 04/20/2016   • Sulfa antibiotics  04/20/2016   • Sulindac  04/20/2016   • Valdecoxib Irritability 04/20/2016   • Prednisone Rash 04/20/2016       Medication information was obtained from: Patients reported medication list as well as patients pharmacy     Pharmacy and Phone Number: [facility] [phone]    Prior to Admission Medications     Prescriptions Last Dose Informant Patient Reported? Taking?    acetaminophen (TYLENOL) 500 MG tablet  Self Yes Yes    Take 500 mg by mouth Every 6 (Six) Hours As Needed for Mild Pain .    Acetylcysteine (N-ACETYL-L-CYSTEINE) 600 MG capsule  Self Yes Yes    Take 600 mg by mouth Daily With Dinner.    amitriptyline (ELAVIL) 25 MG tablet  Self Yes Yes    Take 25 mg by mouth Every Night.    aspirin 81 MG chewable tablet  Self Yes Yes    Chew 81 mg Daily.    azelastine (ASTEPRO) 0.15 % solution nasal spray  Self Yes Yes    2 sprays into each nostril 2 (Two) Times a  Day.    azelastine (OPTIVAR) 0.05 % ophthalmic solution  Self Yes Yes    Administer 1 drop to both eyes 2 (Two) Times a Day.    baclofen (LIORESAL) 10 MG tablet  Self Yes Yes    Take 10 mg by mouth 3 (Three) Times a Day As Needed.    Biotin 2.5 MG capsule  Self Yes Yes    Take 1 capsule by mouth Daily.    bumetanide (BUMEX) 1 MG tablet  Self Yes Yes    Take 1 mg by mouth Every Morning.    Calcium Carb-Cholecalciferol (CALCIUM-VITAMIN D) 500-200 MG-UNIT per tablet  Self Yes Yes    Take 1 tablet by mouth 2 (Two) Times a Day.    carvedilol (COREG) 3.125 MG tablet  Self No Yes    Take 1 tablet by mouth Every 12 (Twelve) Hours for 30 days.    cetirizine (ZyrTEC) 10 MG tablet  Self Yes Yes    Take 10 mg by mouth Daily.    ciclopirox (LOPROX) 0.77 % cream  Self Yes Yes    Apply 1 application topically Daily As Needed.    clobetasol (TEMOVATE) 0.05 % external solution  Self Yes Yes    Apply 1 application topically At Night As Needed.    clotrimazole-betamethasone (LOTRISONE) 1-0.05 % cream  Self Yes Yes    Apply 1 application topically 2 (Two) Times a Day As Needed.    Cyanocobalamin 1000 MCG/ML kit  Self Yes Yes    Inject  as directed Every 30 (Thirty) Days.    doxycycline (VIBRAMYICN) 100 MG tablet  Self Yes Yes    Take 100 mg by mouth Every Morning Before Breakfast.    dry mouth gel (BIOTENE ORALBALANCE) gel  Self Yes Yes    Apply  to the mouth or throat As Needed for Dry Mouth.    flunisolide (NASALIDE) 25 MCG/ACT (0.025%) solution nasal spray  Self Yes Yes    Inhale 2 sprays Every 12 (Twelve) Hours.    glipiZIDE (GLUCOTROL) 5 MG tablet  Self Yes Yes    Take 5 mg by mouth Every Morning.    ketoconazole (NIZORAL) 2 % shampoo  Self Yes Yes    Apply  topically 2 (Two) Times a Week.    loperamide (IMODIUM) 2 MG capsule  Self Yes Yes    Take 2 mg by mouth Daily.    losartan (COZAAR) 25 MG tablet  Self No Yes    Take 1 tablet by mouth Daily.    Melatonin 3 MG tablet  Self Yes Yes    Take 3 mg by mouth Every Night.    Omega-3  Fatty Acids (FISH OIL) 1000 MG capsule capsule  Self Yes Yes    Take 1,000 mg by mouth 2 (Two) Times a Day With Meals.    oxazepam (SERAX) 10 MG capsule  Self Yes Yes    Take 10 mg by mouth Every Night.    oxybutynin XL (DITROPAN-XL) 10 MG 24 hr tablet  Self Yes Yes    Take 10 mg by mouth Daily.    pantoprazole (PROTONIX) 40 MG EC tablet  Self Yes Yes    Take 40 mg by mouth 2 (Two) Times a Day With Meals.    raNITIdine (ZANTAC) 300 MG tablet  Self Yes Yes    Take 300 mg by mouth Every Night.    rosuvastatin (CRESTOR) 10 MG tablet  Self Yes Yes    Take 20 mg by mouth Daily.    saccharomyces boulardii (FLORASTOR) 250 MG capsule  Self Yes Yes    Take 250 mg by mouth 2 (Two) Times a Day.    warfarin (COUMADIN) 4 MG tablet  Self Yes Yes    Take 4 mg by mouth Daily.            Medication notes: Patient reports adherence to all current medications       This medication list is complete to the best of my knowledge as of 1/14/2018    Please call if you have any questions.    Dimitri Russell  Medication History Technician   [id number]  1/14/2018 4:19 PM

## 2018-01-14 NOTE — PLAN OF CARE
Problem: Patient Care Overview (Adult)  Goal: Plan of Care Review  Outcome: Ongoing (interventions implemented as appropriate)   01/14/18 9902   Patient Care Overview   Progress no change   Coping/Psychosocial Response Interventions   Plan Of Care Reviewed With patient   Outcome Evaluation   Outcome Summary/Follow up Plan pt admitted to unit from er at this time. pt admitted for infected knee that was placed in 2002. Pain is controlled no pain medication given. pt to be NPO after MN. pt educated on the importance of glycemic control with H/O DM.      Goal: Adult Individualization and Mutuality  Outcome: Ongoing (interventions implemented as appropriate)    Goal: Discharge Needs Assessment  Outcome: Ongoing (interventions implemented as appropriate)      Problem: Mobility, Physical Impaired (Adult)  Goal: Identify Related Risk Factors and Signs and Symptoms  Outcome: Ongoing (interventions implemented as appropriate)    Goal: Enhanced Mobility Skills  Outcome: Ongoing (interventions implemented as appropriate)    Goal: Enhanced Functionality Ability  Outcome: Ongoing (interventions implemented as appropriate)      Problem: Infection, Risk/Actual (Adult)  Goal: Identify Related Risk Factors and Signs and Symptoms  Outcome: Ongoing (interventions implemented as appropriate)    Goal: Infection Prevention/Resolution  Outcome: Ongoing (interventions implemented as appropriate)      Problem: Fall Risk (Adult)  Goal: Identify Related Risk Factors and Signs and Symptoms  Outcome: Ongoing (interventions implemented as appropriate)    Goal: Absence of Falls  Outcome: Ongoing (interventions implemented as appropriate)

## 2018-01-14 NOTE — PROGRESS NOTES
"Pharmacokinetic Consult - Vancomycin and Zosyn Dosing (Initial Note)    Caitlyn Longoria has been consulted for pharmacy to dose vancomycin and zosyn for bone and joint infection.  Pharmacy dosing vancomycin and zosyn per Dr Joseph's request.   Goal trough: 15-20 mg/L     H/o septic knee    Relevant clinical data and objective history reviewed:  83 y.o. female 162.6 cm (64\") 90.7 kg (200 lb)    Past Medical History:   Diagnosis Date   • Abdominal pain    • Cough    • Diarrhea    • Difficulty breathing    • Dizziness    • DM type 2 (diabetes mellitus, type 2)      Creatinine   Date Value Ref Range Status   01/14/2018 1.02 (H) 0.57 - 1.00 mg/dL Final   12/09/2017 1.06 (H) 0.57 - 1.00 mg/dL Final   12/07/2017 1.07 (H) 0.57 - 1.00 mg/dL Final     BUN   Date Value Ref Range Status   01/14/2018 26 (H) 8 - 23 mg/dL Final     Estimated Creatinine Clearance: 45.6 mL/min (by C-G formula based on Cr of 1.02).    Lab Results   Component Value Date    WBC 7.35 01/14/2018     Temp Readings from Last 3 Encounters:   01/14/18 97.7 °F (36.5 °C) (Tympanic)   12/10/17 97.5 °F (36.4 °C) (Oral)   12/05/17 97.2 °F (36.2 °C) (Temporal Artery )      Baseline culture/source/susceptibility: pending.     Assessment/Plan  Pt received vancomycin 1750mg @ 15:29. Will start vancomycin 1500mg IV q24 and zosyn 3.375mg IV q8h. Will monitor serum creatinine every 24 hours for the first 3 days then at least every 48 hours per dosing recommendations. Vancomycin level 1/16 @ 14:30. Pharmacy will continue to follow daily while on vancomycin and zosyn and adjust as needed.     Samantha Bean [facility]    "

## 2018-01-15 PROBLEM — I50.42 CHRONIC COMBINED SYSTOLIC AND DIASTOLIC CONGESTIVE HEART FAILURE: Status: ACTIVE | Noted: 2018-01-15

## 2018-01-15 PROBLEM — E87.0 HYPERNATREMIA: Status: ACTIVE | Noted: 2018-01-15

## 2018-01-15 LAB
ANION GAP SERPL CALCULATED.3IONS-SCNC: 12.9 MMOL/L
APPEARANCE FLD: ABNORMAL
ASA PLATELET INHIBITION: 523 ARU
BASOPHILS # BLD AUTO: 0.01 10*3/MM3 (ref 0–0.2)
BASOPHILS NFR BLD AUTO: 0.2 % (ref 0–1.5)
BUN BLD-MCNC: 23 MG/DL (ref 8–23)
BUN/CREAT SERPL: 22.3 (ref 7–25)
CALCIUM SPEC-SCNC: 8.2 MG/DL (ref 8.6–10.5)
CHLORIDE SERPL-SCNC: 104 MMOL/L (ref 98–107)
CO2 SERPL-SCNC: 29.1 MMOL/L (ref 22–29)
COLOR FLD: YELLOW
CREAT BLD-MCNC: 1.03 MG/DL (ref 0.57–1)
CRYSTALS FLD MICRO: NORMAL
DEPRECATED RDW RBC AUTO: 56.9 FL (ref 37–54)
EOSINOPHIL # BLD AUTO: 0.08 10*3/MM3 (ref 0–0.7)
EOSINOPHIL NFR BLD AUTO: 1.3 % (ref 0.3–6.2)
ERYTHROCYTE [DISTWIDTH] IN BLOOD BY AUTOMATED COUNT: 15.4 % (ref 11.7–13)
GFR SERPL CREATININE-BSD FRML MDRD: 51 ML/MIN/1.73
GLUCOSE BLD-MCNC: 122 MG/DL (ref 65–99)
GLUCOSE BLDC GLUCOMTR-MCNC: 114 MG/DL (ref 70–130)
GLUCOSE BLDC GLUCOMTR-MCNC: 140 MG/DL (ref 70–130)
GLUCOSE BLDC GLUCOMTR-MCNC: 158 MG/DL (ref 70–130)
GLUCOSE BLDC GLUCOMTR-MCNC: 163 MG/DL (ref 70–130)
HCT VFR BLD AUTO: 34.9 % (ref 35.6–45.5)
HGB BLD-MCNC: 10.9 G/DL (ref 11.9–15.5)
IMM GRANULOCYTES # BLD: 0.02 10*3/MM3 (ref 0–0.03)
IMM GRANULOCYTES NFR BLD: 0.3 % (ref 0–0.5)
INR PPP: 2.44 (ref 0.9–1.1)
LYMPHOCYTES # BLD AUTO: 3.51 10*3/MM3 (ref 0.9–4.8)
LYMPHOCYTES NFR BLD AUTO: 56.9 % (ref 19.6–45.3)
LYMPHOCYTES NFR FLD MANUAL: 17 %
MCH RBC QN AUTO: 31.7 PG (ref 26.9–32)
MCHC RBC AUTO-ENTMCNC: 31.2 G/DL (ref 32.4–36.3)
MCV RBC AUTO: 101.5 FL (ref 80.5–98.2)
MONOCYTES # BLD AUTO: 0.24 10*3/MM3 (ref 0.2–1.2)
MONOCYTES NFR BLD AUTO: 3.9 % (ref 5–12)
MONOCYTES NFR FLD: 3 %
NEUTROPHILS # BLD AUTO: 2.31 10*3/MM3 (ref 1.9–8.1)
NEUTROPHILS NFR BLD AUTO: 37.4 % (ref 42.7–76)
NEUTROPHILS NFR FLD MANUAL: 80 %
OVALOCYTES BLD QL SMEAR: NORMAL
PLAT MORPH BLD: NORMAL
PLATELET # BLD AUTO: 162 10*3/MM3 (ref 140–500)
PMV BLD AUTO: 9.9 FL (ref 6–12)
POTASSIUM BLD-SCNC: 3.7 MMOL/L (ref 3.5–5.2)
PROTHROMBIN TIME: 25.7 SECONDS (ref 11.7–14.2)
RBC # BLD AUTO: 3.44 10*6/MM3 (ref 3.9–5.2)
RBC # FLD AUTO: 3500 /MM3
SODIUM BLD-SCNC: 146 MMOL/L (ref 136–145)
WBC # FLD: 8200 /MM3
WBC MORPH BLD: NORMAL
WBC NRBC COR # BLD: 6.17 10*3/MM3 (ref 4.5–10.7)

## 2018-01-15 PROCEDURE — 85610 PROTHROMBIN TIME: CPT | Performed by: INTERNAL MEDICINE

## 2018-01-15 PROCEDURE — 99222 1ST HOSP IP/OBS MODERATE 55: CPT | Performed by: NURSE PRACTITIONER

## 2018-01-15 PROCEDURE — 80048 BASIC METABOLIC PNL TOTAL CA: CPT | Performed by: INTERNAL MEDICINE

## 2018-01-15 PROCEDURE — 85025 COMPLETE CBC W/AUTO DIFF WBC: CPT | Performed by: INTERNAL MEDICINE

## 2018-01-15 PROCEDURE — 82962 GLUCOSE BLOOD TEST: CPT

## 2018-01-15 PROCEDURE — 63710000001 INSULIN ASPART PER 5 UNITS: Performed by: INTERNAL MEDICINE

## 2018-01-15 PROCEDURE — 25010000002 PIPERACILLIN SOD-TAZOBACTAM PER 1 G: Performed by: INTERNAL MEDICINE

## 2018-01-15 PROCEDURE — 25010000002 VANCOMYCIN: Performed by: INTERNAL MEDICINE

## 2018-01-15 PROCEDURE — 87075 CULTR BACTERIA EXCEPT BLOOD: CPT | Performed by: ORTHOPAEDIC SURGERY

## 2018-01-15 PROCEDURE — 87015 SPECIMEN INFECT AGNT CONCNTJ: CPT | Performed by: ORTHOPAEDIC SURGERY

## 2018-01-15 PROCEDURE — 99223 1ST HOSP IP/OBS HIGH 75: CPT | Performed by: INTERNAL MEDICINE

## 2018-01-15 PROCEDURE — 87205 SMEAR GRAM STAIN: CPT | Performed by: ORTHOPAEDIC SURGERY

## 2018-01-15 PROCEDURE — 87070 CULTURE OTHR SPECIMN AEROBIC: CPT | Performed by: ORTHOPAEDIC SURGERY

## 2018-01-15 PROCEDURE — 89051 BODY FLUID CELL COUNT: CPT | Performed by: ORTHOPAEDIC SURGERY

## 2018-01-15 PROCEDURE — 85007 BL SMEAR W/DIFF WBC COUNT: CPT | Performed by: INTERNAL MEDICINE

## 2018-01-15 PROCEDURE — 85576 BLOOD PLATELET AGGREGATION: CPT | Performed by: NURSE PRACTITIONER

## 2018-01-15 PROCEDURE — 0S9C3ZX DRAINAGE OF RIGHT KNEE JOINT, PERCUTANEOUS APPROACH, DIAGNOSTIC: ICD-10-PCS | Performed by: ORTHOPAEDIC SURGERY

## 2018-01-15 PROCEDURE — 89060 EXAM SYNOVIAL FLUID CRYSTALS: CPT | Performed by: HOSPITALIST

## 2018-01-15 PROCEDURE — 99222 1ST HOSP IP/OBS MODERATE 55: CPT | Performed by: ORTHOPAEDIC SURGERY

## 2018-01-15 RX ORDER — LIDOCAINE HYDROCHLORIDE 10 MG/ML
5 INJECTION, SOLUTION EPIDURAL; INFILTRATION; INTRACAUDAL; PERINEURAL ONCE
Status: COMPLETED | OUTPATIENT
Start: 2018-01-15 | End: 2018-01-15

## 2018-01-15 RX ORDER — ACETAMINOPHEN 650 MG
TABLET, EXTENDED RELEASE ORAL ONCE
Status: COMPLETED | OUTPATIENT
Start: 2018-01-15 | End: 2018-01-15

## 2018-01-15 RX ORDER — ACETAMINOPHEN 325 MG/1
650 TABLET ORAL EVERY 6 HOURS PRN
Status: DISCONTINUED | OUTPATIENT
Start: 2018-01-15 | End: 2018-01-19 | Stop reason: HOSPADM

## 2018-01-15 RX ADMIN — LOSARTAN POTASSIUM 25 MG: 25 TABLET, FILM COATED ORAL at 08:16

## 2018-01-15 RX ADMIN — Medication 250 MG: at 08:15

## 2018-01-15 RX ADMIN — TAZOBACTAM SODIUM AND PIPERACILLIN SODIUM 3.38 G: 375; 3 INJECTION, SOLUTION INTRAVENOUS at 16:02

## 2018-01-15 RX ADMIN — PANTOPRAZOLE SODIUM 40 MG: 40 TABLET, DELAYED RELEASE ORAL at 17:43

## 2018-01-15 RX ADMIN — FAMOTIDINE 40 MG: 40 TABLET, FILM COATED ORAL at 20:34

## 2018-01-15 RX ADMIN — TAZOBACTAM SODIUM AND PIPERACILLIN SODIUM 3.38 G: 375; 3 INJECTION, SOLUTION INTRAVENOUS at 00:22

## 2018-01-15 RX ADMIN — CARVEDILOL 3.12 MG: 3.12 TABLET, FILM COATED ORAL at 20:34

## 2018-01-15 RX ADMIN — AMITRIPTYLINE HYDROCHLORIDE 25 MG: 25 TABLET, FILM COATED ORAL at 20:34

## 2018-01-15 RX ADMIN — Medication 250 MG: at 20:34

## 2018-01-15 RX ADMIN — OXYBUTYNIN CHLORIDE 10 MG: 5 TABLET, EXTENDED RELEASE ORAL at 20:34

## 2018-01-15 RX ADMIN — TAZOBACTAM SODIUM AND PIPERACILLIN SODIUM 3.38 G: 375; 3 INJECTION, SOLUTION INTRAVENOUS at 23:17

## 2018-01-15 RX ADMIN — TAZOBACTAM SODIUM AND PIPERACILLIN SODIUM 3.38 G: 375; 3 INJECTION, SOLUTION INTRAVENOUS at 07:41

## 2018-01-15 RX ADMIN — PANTOPRAZOLE SODIUM 40 MG: 40 TABLET, DELAYED RELEASE ORAL at 07:41

## 2018-01-15 RX ADMIN — VANCOMYCIN HYDROCHLORIDE 1500 MG: 1 INJECTION, POWDER, LYOPHILIZED, FOR SOLUTION INTRAVENOUS at 15:27

## 2018-01-15 RX ADMIN — OXAZEPAM 10 MG: 10 CAPSULE, GELATIN COATED ORAL at 20:34

## 2018-01-15 RX ADMIN — LIDOCAINE HYDROCHLORIDE 5 ML: 10 INJECTION, SOLUTION EPIDURAL; INFILTRATION; INTRACAUDAL; PERINEURAL at 14:00

## 2018-01-15 RX ADMIN — INSULIN ASPART 2 UNITS: 100 INJECTION, SOLUTION INTRAVENOUS; SUBCUTANEOUS at 17:41

## 2018-01-15 RX ADMIN — FLUTICASONE PROPIONATE 2 SPRAY: 50 SPRAY, METERED NASAL at 08:16

## 2018-01-15 RX ADMIN — POVIDONE IODINE 10%: 100 LIQUID TOPICAL at 14:00

## 2018-01-15 RX ADMIN — INSULIN ASPART 2 UNITS: 100 INJECTION, SOLUTION INTRAVENOUS; SUBCUTANEOUS at 23:15

## 2018-01-15 RX ADMIN — ROSUVASTATIN CALCIUM 20 MG: 20 TABLET, FILM COATED ORAL at 08:15

## 2018-01-15 RX ADMIN — LOPERAMIDE HYDROCHLORIDE 2 MG: 2 CAPSULE ORAL at 08:15

## 2018-01-15 RX ADMIN — CARVEDILOL 3.12 MG: 3.12 TABLET, FILM COATED ORAL at 08:16

## 2018-01-15 NOTE — PLAN OF CARE
Problem: Patient Care Overview (Adult)  Goal: Plan of Care Review  Outcome: Ongoing (interventions implemented as appropriate)   01/15/18 0543   Patient Care Overview   Progress no change   Coping/Psychosocial Response Interventions   Plan Of Care Reviewed With patient   Outcome Evaluation   Outcome Summary/Follow up Plan VSS, NPO for Ortho to eval, BRP, no complaints of pain, knee is swollen and warm to touch, educated on glucose monitoring and insulin, will continue to monitor     Goal: Adult Individualization and Mutuality  Outcome: Ongoing (interventions implemented as appropriate)    Goal: Discharge Needs Assessment  Outcome: Ongoing (interventions implemented as appropriate)      Problem: Infection, Risk/Actual (Adult)  Goal: Identify Related Risk Factors and Signs and Symptoms  Outcome: Outcome(s) achieved Date Met: 01/15/18    Goal: Infection Prevention/Resolution  Outcome: Ongoing (interventions implemented as appropriate)      Problem: Fall Risk (Adult)  Goal: Identify Related Risk Factors and Signs and Symptoms  Outcome: Outcome(s) achieved Date Met: 01/15/18    Goal: Absence of Falls  Outcome: Ongoing (interventions implemented as appropriate)

## 2018-01-15 NOTE — CONSULTS
Orthopedic Consult      Patient: Caitlyn Longoria    Date of Admission: 1/14/2018 12:53 PM    YOB: 1934    Medical Record Number: [id number]    Attending Physician: Jv Joseph MD    Consulting Physician: Alfredo Hunter MD      Chief Complaints: Knee effusion, right [M25.461]  Infection of right knee [M00.9]      History of Present Illness: 83 y.o. female admitted to [facility] with Knee effusion, right [M25.461]  Infection of right knee [M00.9]. I was consulted for further evaluation and treatment.  She had a knee replacement many years ago and roughly 5 years ago developed an infection.  I took her to the operating room at that point and performed irrigation debridement poly-exchange and given her poor medical condition plan for future treatment involving oral suppression rather than two-stage exchange.  She has done well over the years since this was done and has been managed on doxycycline 100 mg twice daily.  She states on Wednesday she went to the grocery and then later that evening noticed increasing pain throughout the knee.  It worsened over the weekend she complained of some warmth in the knee difficulty bearing weight.  She denies any fever chills or other signs or symptoms of systemic infection.  She was brought into the hospital yesterday and was started on IV antibiotics unfortunately the knee was not aspirated prior to antibiotic administration.  She feels much better today.  She states she can ambulate and has minimal discomfort within the knee.  Allergies   Allergen Reactions   • Ahist [Chlorpheniramine] Nausea Only, Other (See Comments) and Dizziness     Headache, Blurred vision   • Amoxicillin Other (See Comments)     Yeast infection   • Clarithromycin Nausea Only     Other reaction(s): Headache, Depression, Flushed   • Diclofenac      Voltaren   • Diphenhydramine      Benadryl   • Esomeprazole      Nexium. Stomach issues   • Ibuprofen Other (See Comments)      Does not recall    • Levalbuterol Swelling     Xopenex   • Levocetirizine Other (See Comments)     Xyzal. Diarrhea, Stomach cramps   • Lipitor [Atorvastatin] Other (See Comments)     Muscle pain and weakness, dark urine   • Lodine [Etodolac]    • Omeprazole Nausea Only     Prilosec. Headache   • Pravachol [Pravastatin] Nausea Only and Other (See Comments)     Bloated, Constipation, Headaches   • Quinapril Swelling and Confusion     Accupril. Headaches, constipation   • Sulfa Antibiotics    • Sulindac      Other reaction(s): Headache, joint pain, bruising   • Valdecoxib Irritability   • Prednisone Rash        Home Medications:  Prescriptions Prior to Admission   Medication Sig Dispense Refill Last Dose   • acetaminophen (TYLENOL) 500 MG tablet Take 500 mg by mouth Every 6 (Six) Hours As Needed for Mild Pain .      • Acetylcysteine (N-ACETYL-L-CYSTEINE) 600 MG capsule Take 600 mg by mouth Daily With Dinner.      • amitriptyline (ELAVIL) 25 MG tablet Take 25 mg by mouth Every Night.      • aspirin 81 MG chewable tablet Chew 81 mg Daily.      • azelastine (ASTEPRO) 0.15 % solution nasal spray 2 sprays into each nostril 2 (Two) Times a Day.   Past Week at Unknown time   • azelastine (OPTIVAR) 0.05 % ophthalmic solution Administer 1 drop to both eyes 2 (Two) Times a Day.   Past Week at Unknown time   • baclofen (LIORESAL) 10 MG tablet Take 10 mg by mouth 3 (Three) Times a Day As Needed.      • Biotin 2.5 MG capsule Take 1 capsule by mouth Daily.      • bumetanide (BUMEX) 1 MG tablet Take 1 mg by mouth Every Morning.   12/9/2017 at Unknown time   • Calcium Carb-Cholecalciferol (CALCIUM-VITAMIN D) 500-200 MG-UNIT per tablet Take 1 tablet by mouth 2 (Two) Times a Day.      • carvedilol (COREG) 3.125 MG tablet Take 1 tablet by mouth Every 12 (Twelve) Hours for 30 days. 180 tablet 0    • cetirizine (ZyrTEC) 10 MG tablet Take 10 mg by mouth Daily.   12/9/2017 at Unknown time   • ciclopirox (LOPROX) 0.77 % cream Apply 1  application topically Daily As Needed.      • clobetasol (TEMOVATE) 0.05 % external solution Apply 1 application topically At Night As Needed.      • clotrimazole-betamethasone (LOTRISONE) 1-0.05 % cream Apply 1 application topically 2 (Two) Times a Day As Needed.      • Cyanocobalamin 1000 MCG/ML kit Inject  as directed Every 30 (Thirty) Days.      • doxycycline (VIBRAMYICN) 100 MG tablet Take 100 mg by mouth Every Morning Before Breakfast.      • dry mouth gel (BIOTENE ORALBALANCE) gel Apply  to the mouth or throat As Needed for Dry Mouth.      • flunisolide (NASALIDE) 25 MCG/ACT (0.025%) solution nasal spray Inhale 2 sprays Every 12 (Twelve) Hours.   Past Week at Unknown time   • glipiZIDE (GLUCOTROL) 5 MG tablet Take 5 mg by mouth Every Morning.   12/9/2017 at Unknown time   • ketoconazole (NIZORAL) 2 % shampoo Apply  topically 2 (Two) Times a Week.      • loperamide (IMODIUM) 2 MG capsule Take 2 mg by mouth Daily.      • losartan (COZAAR) 25 MG tablet Take 1 tablet by mouth Daily. 90 tablet 0    • Melatonin 3 MG tablet Take 3 mg by mouth Every Night.   Past Week at Unknown time   • Omega-3 Fatty Acids (FISH OIL) 1000 MG capsule capsule Take 1,000 mg by mouth 2 (Two) Times a Day With Meals.      • oxazepam (SERAX) 10 MG capsule Take 10 mg by mouth Every Night.   12/8/2017 at Unknown time   • oxybutynin XL (DITROPAN-XL) 10 MG 24 hr tablet Take 10 mg by mouth Daily.      • pantoprazole (PROTONIX) 40 MG EC tablet Take 40 mg by mouth 2 (Two) Times a Day With Meals.   12/9/2017 at Unknown time   • raNITIdine (ZANTAC) 300 MG tablet Take 300 mg by mouth Every Night.      • rosuvastatin (CRESTOR) 10 MG tablet Take 20 mg by mouth Daily.      • saccharomyces boulardii (FLORASTOR) 250 MG capsule Take 250 mg by mouth 2 (Two) Times a Day.      • warfarin (COUMADIN) 4 MG tablet Take 4 mg by mouth Daily.   12/8/2017 at Unknown time       Current Medications:  Scheduled Meds:  amitriptyline 25 mg Oral Nightly   carvedilol 3.125  mg Oral Q12H   famotidine 40 mg Oral Nightly   fluticasone 2 spray Each Nare Daily   insulin aspart 0-7 Units Subcutaneous 4x Daily With Meals & Nightly   lidocaine PF 1% 5 mL Injection Once   loperamide 2 mg Oral Daily   losartan 25 mg Oral Daily   oxazepam 10 mg Oral Nightly   oxybutynin XL 10 mg Oral Daily   pantoprazole 40 mg Oral BID With Meals   piperacillin-tazobactam 3.375 g Intravenous Q8H   povidone-iodine  Topical Once   rosuvastatin 20 mg Oral Daily   saccharomyces boulardii 250 mg Oral BID   vancomycin 1,500 mg Intravenous Q24H     Continuous Infusions:  Pharmacy to dose vancomycin    Pharmacy to Dose Zosyn      PRN Meds:.•  acetaminophen  •  dextrose  •  dextrose  •  glucagon (human recombinant)  •  Morphine **AND** naloxone  •  ondansetron **OR** ondansetron ODT **OR** ondansetron  •  Pharmacy to dose vancomycin  •  Pharmacy to Dose Zosyn    Past Medical History:   Diagnosis Date   • Abdominal pain    • Cough    • Diarrhea    • Difficulty breathing    • Dizziness    • DM type 2 (diabetes mellitus, type 2)         Past Surgical History:   Procedure Laterality Date   • CARDIAC CATHETERIZATION     • CHOLECYSTECTOMY     • CORONARY STENT PLACEMENT     • HERNIA REPAIR     • HYSTERECTOMY     • PACEMAKER IMPLANTATION     • REPLACEMENT TOTAL KNEE          Social History     Occupational History   • Not on file.     Social History Main Topics   • Smoking status: Never Smoker   • Smokeless tobacco: Never Used   • Alcohol use No   • Drug use: No   • Sexual activity: Not on file    Social History     Social History Narrative      History reviewed. No pertinent family history.      Review of Systems:   HEENT: Patient denies any headaches, vision changes, change in hearing, or tinnitus, Patient denies any rhinorrhea,epistaxis, sinus pain, mouth or dental problems, sore throat or hoarseness, or dysphagia  Pulmonary: Patient denies any cough, congestion, SOA, or wheezing  Cardiovascular: Patient denies any chest pain,  dyspnea, palpitations, weakness, intolerance of exercise, varicosities, swelling of extremities, known murmur  Gastrointestinal:  Patient denies nausea, vomiting, diarrhea, constipation, loss  of appetite, change in appetite, dysphagia, gas, heartburn, melena, change in bowel habits, use of laxatives or other drugs to alter the function of the gastrointestinal tract.  Genital/Urinary: Patient denies dysuria, change in color of urine, change in frequency of urination, pain with urgency, incontinence, retention, or nocturia.  Musculoskeletal:Complains of discomfort in the right knee with ambulation  Neurological: Patient denies dizziness, tremor, ataxia, difficulty in speaking, change in speech, paresthesia, loss of sensation, seizures, syncope, changes in memory.  Endocrine system: Patient denies tremors, palpitations, intolerance of heat or cold, polyuria, polydipsia, polyphagia, diaphoresis, exophthalmos, or goiter.  Psychological: Patient denies thoughts/plans or harming self or other; depression,  insomnia, night terrors, denis, memory loss, disorientation.  Skin: Patient denies any bruising, rashes, discoloration, pruritus, wounds, ulcers, decubiti, changes in the hair or nails  Hematopoietic: Patient denies history of spontaneous or excessive bleeding, epistaxis, hematuria, melena, fatigue, enlarged or tender lymph nodes, pallor, history of anemia.    Physical Exam: 83 y.o. female  General Appearance:    Alert, cooperative, in no acute distress                 Vitals:    01/14/18 2300 01/15/18 0300 01/15/18 0721 01/15/18 1057   BP: 122/73 98/64 119/76 96/59   BP Location: Left arm Left arm Left arm Left arm   Patient Position: Lying Lying Lying Lying   Pulse: 76 78 81 80   Resp: 16 16 16 16   Temp: 98.5 °F (36.9 °C) 97.4 °F (36.3 °C) 97.3 °F (36.3 °C) 98 °F (36.7 °C)   TempSrc: Oral Oral Oral Oral   SpO2: 93% 96% 95% 96%   Weight:       Height:            Head:    Normocephalic, without obvious abnormality,  atraumatic   Eyes:            Lids and lashes normal, conjunctivae and sclerae normal, no   icterus, no pallor, corneas clear, PERRLA   Ears:    Ears appear intact with no abnormalities noted   Throat:   No oral lesions, no thrush, oral mucosa moist   Neck:   No adenopathy, supple, trachea midline, no thyromegaly, no   carotid bruit, no JVD   Back:     No kyphosis present, no scoliosis present, no skin lesions,      erythema or scars, no tenderness to percussion or                   palpation,   range of motion normal   Lungs:     Clear to auscultation,respirations regular, even and                  unlabored    Heart:    Regular rhythm and normal rate, normal S1 and S2, no            murmur, no gallop, no rub, no click   Chest Wall:    No abnormalities observed   Abdomen:     Normal bowel sounds, no masses, no organomegaly, soft        non-tender, non-distended, no guarding, no rebound                tenderness   Rectal:     Deferred   Extremities:   Tenderness noted at Right knee with ambulation.  She has a can flex about 90° without pain.  There is a 1-2+ effusion.  Moves all extremities well, no         edema, no cyanosis, no redness   Pulses:   Pulses palpable and equal bilaterally   Skin:   No bleeding, bruising or rash   Lymph nodes:   No palpable adenopathy   Neurologic:   Cranial nerves 2 - 12 grossly intact, sensation intact, DTR       present and equal bilaterally           Diagnostic Tests:    Admission on 01/14/2018   Component Date Value Ref Range Status   • Glucose 01/14/2018 122* 65 - 99 mg/dL Final   • BUN 01/14/2018 26* 8 - 23 mg/dL Final   • Creatinine 01/14/2018 1.02* 0.57 - 1.00 mg/dL Final   • Sodium 01/14/2018 140  136 - 145 mmol/L Final   • Potassium 01/14/2018 3.9  3.5 - 5.2 mmol/L Final   • Chloride 01/14/2018 99  98 - 107 mmol/L Final   • CO2 01/14/2018 27.1  22.0 - 29.0 mmol/L Final   • Calcium 01/14/2018 8.8  8.6 - 10.5 mg/dL Final   • Total Protein 01/14/2018 6.6  6.0 - 8.5 g/dL Final    • Albumin 01/14/2018 3.70  3.50 - 5.20 g/dL Final   • ALT (SGPT) 01/14/2018 18  1 - 33 U/L Final   • AST (SGOT) 01/14/2018 20  1 - 32 U/L Final   • Alkaline Phosphatase 01/14/2018 89  39 - 117 U/L Final   • Total Bilirubin 01/14/2018 0.9  0.1 - 1.2 mg/dL Final   • eGFR Non African Amer 01/14/2018 52* >60 mL/min/1.73 Final   • Globulin 01/14/2018 2.9  gm/dL Final   • A/G Ratio 01/14/2018 1.3  g/dL Final   • BUN/Creatinine Ratio 01/14/2018 25.5* 7.0 - 25.0 Final   • Anion Gap 01/14/2018 13.9  mmol/L Final   • Protime 01/14/2018 24.7* 11.7 - 14.2 Seconds Final   • INR 01/14/2018 2.32* 0.90 - 1.10 Final   • Blood Culture 01/14/2018 No growth at less than 24 hours   Preliminary   • Blood Culture 01/14/2018 No growth at 24 hours   Preliminary   • Lactate 01/14/2018 1.5  0.5 - 2.0 mmol/L Final   • Sed Rate 01/14/2018 34* 0 - 30 mm/hr Final   • C-Reactive Protein 01/14/2018 1.99* 0.00 - 0.50 mg/dL Final   • WBC 01/14/2018 7.35  4.50 - 10.70 10*3/mm3 Final   • RBC 01/14/2018 3.75* 3.90 - 5.20 10*6/mm3 Final   • Hemoglobin 01/14/2018 12.1  11.9 - 15.5 g/dL Final   • Hematocrit 01/14/2018 37.4  35.6 - 45.5 % Final   • MCV 01/14/2018 99.7* 80.5 - 98.2 fL Final   • MCH 01/14/2018 32.3* 26.9 - 32.0 pg Final   • MCHC 01/14/2018 32.4  32.4 - 36.3 g/dL Final   • RDW 01/14/2018 15.4* 11.7 - 13.0 % Final   • RDW-SD 01/14/2018 55.9* 37.0 - 54.0 fl Final   • MPV 01/14/2018 10.4  6.0 - 12.0 fL Final   • Platelets 01/14/2018 185  140 - 500 10*3/mm3 Final   • Neutrophil % 01/14/2018 44.7  42.7 - 76.0 % Final   • Lymphocyte % 01/14/2018 50.9* 19.6 - 45.3 % Final   • Monocyte % 01/14/2018 2.6* 5.0 - 12.0 % Final   • Eosinophil % 01/14/2018 1.1  0.3 - 6.2 % Final   • Basophil % 01/14/2018 0.3  0.0 - 1.5 % Final   • Immature Grans % 01/14/2018 0.4  0.0 - 0.5 % Final   • Neutrophils, Absolute 01/14/2018 3.29  1.90 - 8.10 10*3/mm3 Final   • Lymphocytes, Absolute 01/14/2018 3.74  0.90 - 4.80 10*3/mm3 Final   • Monocytes, Absolute 01/14/2018  0.19* 0.20 - 1.20 10*3/mm3 Final   • Eosinophils, Absolute 01/14/2018 0.08  0.00 - 0.70 10*3/mm3 Final   • Basophils, Absolute 01/14/2018 0.02  0.00 - 0.20 10*3/mm3 Final   • Immature Grans, Absolute 01/14/2018 0.03  0.00 - 0.03 10*3/mm3 Final   • Glucose 01/14/2018 105  70 - 130 mg/dL Final   • Glucose 01/15/2018 122* 65 - 99 mg/dL Final   • BUN 01/15/2018 23  8 - 23 mg/dL Final   • Creatinine 01/15/2018 1.03* 0.57 - 1.00 mg/dL Final   • Sodium 01/15/2018 146* 136 - 145 mmol/L Final   • Potassium 01/15/2018 3.7  3.5 - 5.2 mmol/L Final   • Chloride 01/15/2018 104  98 - 107 mmol/L Final   • CO2 01/15/2018 29.1* 22.0 - 29.0 mmol/L Final   • Calcium 01/15/2018 8.2* 8.6 - 10.5 mg/dL Final   • eGFR Non African Amer 01/15/2018 51* >60 mL/min/1.73 Final   • BUN/Creatinine Ratio 01/15/2018 22.3  7.0 - 25.0 Final   • Anion Gap 01/15/2018 12.9  mmol/L Final   • Protime 01/15/2018 25.7* 11.7 - 14.2 Seconds Final   • INR 01/15/2018 2.44* 0.90 - 1.10 Final   • WBC 01/15/2018 6.17  4.50 - 10.70 10*3/mm3 Final   • RBC 01/15/2018 3.44* 3.90 - 5.20 10*6/mm3 Final   • Hemoglobin 01/15/2018 10.9* 11.9 - 15.5 g/dL Final   • Hematocrit 01/15/2018 34.9* 35.6 - 45.5 % Final   • MCV 01/15/2018 101.5* 80.5 - 98.2 fL Final   • MCH 01/15/2018 31.7  26.9 - 32.0 pg Final   • MCHC 01/15/2018 31.2* 32.4 - 36.3 g/dL Final   • RDW 01/15/2018 15.4* 11.7 - 13.0 % Final   • RDW-SD 01/15/2018 56.9* 37.0 - 54.0 fl Final   • MPV 01/15/2018 9.9  6.0 - 12.0 fL Final   • Platelets 01/15/2018 162  140 - 500 10*3/mm3 Final   • Neutrophil % 01/15/2018 37.4* 42.7 - 76.0 % Final   • Lymphocyte % 01/15/2018 56.9* 19.6 - 45.3 % Final   • Monocyte % 01/15/2018 3.9* 5.0 - 12.0 % Final   • Eosinophil % 01/15/2018 1.3  0.3 - 6.2 % Final   • Basophil % 01/15/2018 0.2  0.0 - 1.5 % Final   • Immature Grans % 01/15/2018 0.3  0.0 - 0.5 % Final   • Neutrophils, Absolute 01/15/2018 2.31  1.90 - 8.10 10*3/mm3 Final   • Lymphocytes, Absolute 01/15/2018 3.51  0.90 - 4.80  10*3/mm3 Final   • Monocytes, Absolute 01/15/2018 0.24  0.20 - 1.20 10*3/mm3 Final   • Eosinophils, Absolute 01/15/2018 0.08  0.00 - 0.70 10*3/mm3 Final   • Basophils, Absolute 01/15/2018 0.01  0.00 - 0.20 10*3/mm3 Final   • Immature Grans, Absolute 01/15/2018 0.02  0.00 - 0.03 10*3/mm3 Final   • Ovalocytes 01/15/2018 Slight/1+  None Seen Final   • WBC Morphology 01/15/2018 Normal  Normal Final   • Platelet Morphology 01/15/2018 Normal  Normal Final   • Glucose 01/15/2018 114  70 - 130 mg/dL Final   • Glucose 01/15/2018 140* 70 - 130 mg/dL Final       Xr Knee 1 Or 2 View Right    Result Date: 1/14/2018  Narrative: RIGHT KNEE 2 VIEWS  HISTORY: Patient is an 83-year-old female status post right knee arthroplasty 06/20/2014 presenting for evaluation of pain and swelling right knee laterally x5 days.  COMPARISON: None available  FINDINGS: 1. There is moderate joint effusion. 2. Right knee arthroplasty is intact. 3. Diffuse soft tissue calcification suggests previous inflammatory change possible previous hemorrhage. 4. Prominent spurring at the superior and inferior aspect patella, no acute osseous abnormality.  This report was finalized on 1/14/2018 1:57 PM by Dr. Negrito Lewis MD.      Fl Video Swallow With Speech    Result Date: 12/21/2017  Narrative: VIDEO SWALLOW STUDY  HISTORY: Dysphagia.  FINDINGS:  3 minutes 33 seconds fluoroscopy was provided for the speech pathologist during a video swallow study.  Transient penetration was observed with thin liquids.      Impression: Fluoroscopy was provided for the speech pathologist during a video swallow study. For full details please see the speech pathology report.  This report was finalized on 12/21/2017 4:16 PM by Dr. Jonah Kilgore MD.      Xr Chest Pa & Lateral    Result Date: 1/14/2018  Narrative: ONE VIEW PORTABLE CHEST AT 5:28 PM  HISTORY: Cough.  FINDINGS: There is prominent elevation of the left hemidiaphragm unchanged from previous studies including the most  recent previous exam dated 12/07/2017. The heart remains enlarged with a pacemaker in place. The lungs are clear except for some minimal vague chronic atelectasis or scarring in the left perihilar region adjacent to the elevated hemidiaphragm.  This report was finalized on 1/14/2018 6:57 PM by Dr. Guille Trotter MD.          Assessment:  Patient Active Problem List   Diagnosis   • Neck and shoulder pain   • Arthropathy of shoulder region   • Carpal tunnel syndrome of left wrist   • Chronic pain of both shoulders   • Chronic left shoulder pain   • Arthropathy of left shoulder   • Dysarthria   • DM type 2 (diabetes mellitus, type 2)   • Atrial fibrillation   • HLD (hyperlipidemia)   • Bronchitis   • CAD (coronary artery disease), hx of stent   • CVA (cerebral vascular accident)   • Infection of right knee   • HTN (hypertension)   • CKD (chronic kidney disease) stage 3, GFR 30-59 ml/min   • Hypernatremia           Plan:  The patient voiced understanding of the risks, benefits, and alternative forms of treatment that were discussed and the patient consents to proceed with Observation.  I aspirated her knee about 40 cc of relatively clear fluid that was not obviously infectious in gross appearance.  We will send it off for cell count culture and sensitivity.  We may have to rely more heavily on the cell count given her administration of IV antibiotics prior to aspiration.  She is a very poor surgical candidate and I would like to avoid surgery if possible.  This may play into the decision making.  For now we'll continue to have her hold her Coumadin and follow the cell count and cultures and potentially make further plans tomorrow..     Date: 1/15/2018    Alfredo Hunter MD

## 2018-01-15 NOTE — PROGRESS NOTES
Discharge Planning Assessment  [facility]     Patient Name: Caitlyn Longoria  MRN: [id number]  Today's Date: 1/15/2018    Admit Date: 1/14/2018          Discharge Needs Assessment       01/15/18 1603    Living Environment    Lives With alone    Living Arrangements house    Home Accessibility stairs to enter home    Stair Railings at Home outside, present at both sides    Type of Financial/Environmental Concern none    Transportation Available car;family or friend will provide    Living Environment    Quality Of Family Relationships involved    Discharge Needs Assessment    Readmission Within The Last 30 Days no previous admission in last 30 days    Equipment Currently Used at Home cane, straight    Discharge Facility/Level Of Care Needs home with home health;nursing facility, skilled            Discharge Plan       01/15/18 1604    Case Management/Social Work Plan    Plan TBD, SNF vs Home w/ HH (referrals pending w/ [facility] and [facility])     Additional Comments Met w/ pt verified facesheet and d/c plan. Pt lives alone, she would like referrals to [facility] as her first and [facility] as backup. She will possibly d/c on IV abx pending cx results. [facility] also notified for home IV abx, [facility]CP will f/u 1/16 .         Discharge Placement     Facility/Agency Request Status Selected? Address Phone Number Fax Number    [facility] Pending - Request Sent     [address] [address] [phone]    [facility] Pending - Request Sent     [id number] [address] [phone]                Demographic Summary     None            Functional Status       01/15/18 1603    Functional Status Current    Ambulation 3-->assistive equipment and person    Transferring 3-->assistive equipment and person    Toileting 3-->assistive equipment and person    Bathing 0-->independent    Dressing 0-->independent    Eating 0-->independent     Communication 0-->understands/communicates without difficulty    Swallowing (if score 2 or more for any item, consult Rehab Services) 0-->swallows foods/liquids without difficulty    Change in Functional Status Since Onset of Current Illness/Injury yes    Functional Status Prior    Ambulation 1-->assistive equipment    Transferring 0-->independent    Toileting 0-->independent    Bathing 0-->independent    Dressing 0-->independent    Eating 0-->independent    Communication 0-->understands/communicates without difficulty    Swallowing 0-->swallows foods/liquids without difficulty    IADL    Medications independent    Meal Preparation independent    Housekeeping assistive equipment    Laundry independent    Shopping assistive equipment    Oral Care independent    Activity Tolerance    Usual Activity Tolerance moderate    Current Activity Tolerance moderate    Cognitive/Perceptual/Developmental    Current Mental Status/Cognitive Functioning no deficits noted    Recent Changes in Mental Status/Cognitive Functioning no changes            Psychosocial     None            Abuse/Neglect     None            Legal     None            Substance Abuse     None            Patient Forms       01/15/18 1604    Patient Forms    Provider Choice List Delivered    Delivered to Patient    Method of delivery Telephone          Marcy Lopez RN

## 2018-01-15 NOTE — PLAN OF CARE
Problem: Patient Care Overview (Adult)  Goal: Plan of Care Review  Outcome: Ongoing (interventions implemented as appropriate)   01/15/18 3018   Patient Care Overview   Progress no change   Coping/Psychosocial Response Interventions   Plan Of Care Reviewed With patient   Outcome Evaluation   Outcome Summary/Follow up Plan VSS, R knee remains swollen. Aspiration completed by Dr. Hunter. Plan for NPO after midnight. Awaiting results from aspirate. Reviewed BS and glucose monitoring. and insulin. Will continue to monitor     Goal: Adult Individualization and Mutuality  Outcome: Ongoing (interventions implemented as appropriate)    Goal: Discharge Needs Assessment  Outcome: Ongoing (interventions implemented as appropriate)      Problem: Mobility, Physical Impaired (Adult)  Goal: Identify Related Risk Factors and Signs and Symptoms  Outcome: Ongoing (interventions implemented as appropriate)    Goal: Enhanced Mobility Skills  Outcome: Ongoing (interventions implemented as appropriate)    Goal: Enhanced Functionality Ability  Outcome: Ongoing (interventions implemented as appropriate)      Problem: Infection, Risk/Actual (Adult)  Goal: Infection Prevention/Resolution  Outcome: Ongoing (interventions implemented as appropriate)      Problem: Fall Risk (Adult)  Goal: Absence of Falls  Outcome: Outcome(s) achieved Date Met: 01/15/18

## 2018-01-15 NOTE — CONSULTS
Date of Hospital Visit:01/15/18  Encounter Provider: Rommel Veliz MD  Place of Service: [facility] CARDIOLOGY  Patient Name: Caitlyn Longoria  :1934  Referral Provider: Jv Joseph*    Chief complaint: CAD    History of Present Illness:  The patient is a pleasant 82-year-old white female with a history of diabetes and  hypertension who presented in 2008 with chest discomfort. In 2007, it  looked like she had had an anterior infarct. Echocardiogram then confirmed that. She then  had cardiac catheterization on 2008, which confirmed a left ventricular  ejection fraction of 35%, total occlusion of the mid left anterior descending, severe  disease of the large diagonal, and insignificant disease of the right coronary artery and  circumflex. She was admitted and had outpatient angioplasty and drug-eluting stent  placement of the diagonal. Unfortunately, she developed a right femoral pseudoaneurysm and  had to have that surgically repaired. Follow-up echocardiogram in  showed her left  ventricular function had returned to normal. She then presented on 2011, with  increasing shortness of breath and was found to have some mild congestive heart failure,  atrial flutter, and marked bradycardia. She had a repeat echocardiogram again that  confirmed normal left ventricular systolic function. She had a dual-chamber pacemaker  implanted. She continued to have episodes of atrial flutter with symptomatic dyspnea. She  underwent atrial flutter ablation in 2011.  She then presented in 2012 with dyspnea, thought that this might be heart  failure, anginal equivalence. She underwent cardiac catheterization which showed elevated  right-sided pressure with a PA systolic pressure of 53 mmHg, mean of 31 mmHg, but elevated  wedge pressure at 26. She was also found to have in-stent stenosis of the diagonal vessel.  Other vessels  were free of disease with the exception of total occlusion of the mid left  anterior descending which was unchanged. A left ventricular ejection fraction of  approximately 50%. She underwent angioplasty and drug-eluting stent placement of the  diagonal vessel. Prinivil was increased. We increased her Lasix to 30 mg a day however,  she developed dizziness on that and we had to cut her back to 20 mg of Lasix a day.  She unfortunately has had a couple of episodes of passing out. It turns out that was from low   blood pressure, and they resolved with adjusting her medications.  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 She ended up being admitted and had the device removed and cleaned  out. During that hospital stay she was found to have a nonfunctioning RV lead. She was  stable and in view of the infection, it was decided not to go and replace the lead at that  time.  She then in 10/2014 had her RV lead revised.   Then in November 2016 was having some increased shortness of breath had a perfusion stress test that showed a very small area of ischemia in the septal wall her echocardiogram showed a left ventricular ejection fraction of approximately 45% which was about her baseline no real significant valvular disease.  She isn't subsequently diagnosed as having significant gastroesophageal reflux with aspiration and been treated for that.  December 2017 she presented with dysarthria was felt to have a supravascular event on carotid ultrasound did have some ulcerated plaque attempted to add clopidogrel to her warfarin but had bleeding issues was then switched 81 mg aspirin.  Those symptoms resolved.  However she now presents with a few days of pain in her right knee with swelling and may have septic knee this is been previously drained.  She's had no chest pain or pressure no further stroke symptoms no palpitations no orthopnea or PND denied any fevers  chills or sweats she still has her baseline shortness of breath but that's been unchanged.              Past Medical History:   Diagnosis Date   • Abdominal pain    • Cough    • Diarrhea    • Difficulty breathing    • Dizziness    • DM type 2 (diabetes mellitus, type 2)        Past Surgical History:   Procedure Laterality Date   • CARDIAC CATHETERIZATION     • CHOLECYSTECTOMY     • CORONARY STENT PLACEMENT     • HERNIA REPAIR     • HYSTERECTOMY     • PACEMAKER IMPLANTATION     • REPLACEMENT TOTAL KNEE         No current facility-administered medications on file prior to encounter.      Current Outpatient Prescriptions on File Prior to Encounter   Medication Sig Dispense Refill   • azelastine (ASTEPRO) 0.15 % solution nasal spray 2 sprays into each nostril 2 (Two) Times a Day.     • azelastine (OPTIVAR) 0.05 % ophthalmic solution Administer 1 drop to both eyes 2 (Two) Times a Day.     • bumetanide (BUMEX) 1 MG tablet Take 1 mg by mouth Every Morning.     • carvedilol (COREG) 3.125 MG tablet Take 1 tablet by mouth Every 12 (Twelve) Hours for 30 days. 180 tablet 0   • cetirizine (ZyrTEC) 10 MG tablet Take 10 mg by mouth Daily.     • flunisolide (NASALIDE) 25 MCG/ACT (0.025%) solution nasal spray Inhale 2 sprays Every 12 (Twelve) Hours.     • glipiZIDE (GLUCOTROL) 5 MG tablet Take 5 mg by mouth Every Morning.     • losartan (COZAAR) 25 MG tablet Take 1 tablet by mouth Daily. 90 tablet 0   • Melatonin 3 MG tablet Take 3 mg by mouth Every Night.     • oxazepam (SERAX) 10 MG capsule Take 10 mg by mouth Every Night.     • oxybutynin XL (DITROPAN-XL) 10 MG 24 hr tablet Take 10 mg by mouth Daily.     • pantoprazole (PROTONIX) 40 MG EC tablet Take 40 mg by mouth 2 (Two) Times a Day With Meals.     • rosuvastatin (CRESTOR) 10 MG tablet Take 20 mg by mouth Daily.     • warfarin (COUMADIN) 4 MG tablet Take 4 mg by mouth Daily.         Social History     Social History   • Marital status: Single     Spouse name: N/A   • Number of  "children: N/A   • Years of education: N/A     Occupational History   • Not on file.     Social History Main Topics   • Smoking status: Never Smoker   • Smokeless tobacco: Never Used   • Alcohol use No   • Drug use: No   • Sexual activity: Not on file     Other Topics Concern   • Not on file     Social History Narrative       History reviewed. No pertinent family history.    REVIEW OF SYSTEMS:   All ROS was performed and is Negative except as outlined in HPI    Objective:     Vitals:    01/14/18 1642 01/14/18 1900 01/14/18 2300 01/15/18 0300   BP: 122/74 98/64 122/73 98/64   BP Location: Left arm Left arm Left arm Left arm   Patient Position: Sitting Lying Lying Lying   Pulse: 77 79 76 78   Resp: 16 16 16 16   Temp: 98.3 °F (36.8 °C) 97.5 °F (36.4 °C) 98.5 °F (36.9 °C) 97.4 °F (36.3 °C)   TempSrc: Oral Oral Oral Oral   SpO2: 99% 99% 93% 96%   Weight: 90.7 kg (200 lb)      Height: 162.6 cm (64\")        Body mass index is 34.33 kg/(m^2).  Flowsheet Rows         First Filed Value    Admission Height  162.6 cm (64\") Documented at 01/14/2018 1252    Admission Weight  90.7 kg (200 lb) Documented at 01/14/2018 1252          Physical Exam   Physical Exam   Constitutional: She is oriented to person, place, and time. She appears well-developed and well-nourished. No distress.   HENT:   Head: Normocephalic.   Eyes: Conjunctivae are normal. Pupils are equal, round, and reactive to light. No scleral icterus.   Neck: Normal carotid pulses, no hepatojugular reflux and no JVD present. Carotid bruit is not present. No tracheal deviation, no edema and no erythema present. No thyromegaly present.   Cardiovascular: Normal rate, regular rhythm, S1 normal, S2 normal and intact distal pulses.   No extrasystoles are present. PMI is not displaced.  Exam reveals no gallop, no distant heart sounds and no friction rub.    Murmur heard.   Systolic murmur is present with a grade of 2/6  at the lower left sternal border  Pulses:       Carotid " pulses are 2+ on the right side, and 2+ on the left side.       Radial pulses are 2+ on the right side, and 2+ on the left side.        Femoral pulses are 2+ on the right side, and 2+ on the left side.       Dorsalis pedis pulses are 2+ on the right side, and 2+ on the left side.        Posterior tibial pulses are 2+ on the right side, and 2+ on the left side.   Pulmonary/Chest: Effort normal and breath sounds normal. No respiratory distress. She has no decreased breath sounds. She has no wheezes. She has no rhonchi. She has no rales. She exhibits no tenderness.   Abdominal: Soft. Bowel sounds are normal. She exhibits no distension and no mass. There is no hepatosplenomegaly. There is no tenderness. There is no rebound and no guarding.   Musculoskeletal: She exhibits no edema, tenderness or deformity.   Neurological: She is alert and oriented to person, place, and time.   Skin: Skin is warm and dry. No rash noted. She is not diaphoretic. No cyanosis or erythema. No pallor. Nails show no clubbing.   Psychiatric: She has a normal mood and affect. Her speech is normal and behavior is normal. Judgment and thought content normal.       Lab Review:                  Results from last 7 days  Lab Units 01/15/18  0423   SODIUM mmol/L 146*   POTASSIUM mmol/L 3.7   CHLORIDE mmol/L 104   CO2 mmol/L 29.1*   BUN mg/dL 23   CREATININE mg/dL 1.03*   GLUCOSE mg/dL 122*   CALCIUM mg/dL 8.2*           Results from last 7 days  Lab Units 01/15/18  0423   WBC 10*3/mm3 6.17   HEMOGLOBIN g/dL 10.9*   HEMATOCRIT % 34.9*   PLATELETS 10*3/mm3 162       Results from last 7 days  Lab Units 01/15/18  0423 01/14/18  1338   INR  2.44* 2.32*                 @LABRCNT(bnp)@    I personally viewed and interpreted the patient's EKG/Telemetry data    Assessment:    The patient is a pleasant 82-year-old white female with a history of diabetes and  hypertension who presented in January 2008 with chest discomfort. In October 2007, it  looked like she had  had an anterior infarct. Echocardiogram then confirmed that. She then  had cardiac catheterization on February 11, 2008, which confirmed a left ventricular  ejection fraction of 35%, total occlusion of the mid left anterior descending, severe  disease of the large diagonal, and insignificant disease of the right coronary artery and  circumflex. She was admitted and had outpatient angioplasty and drug-eluting stent  placement of the diagonal. Unfortunately, she developed a right femoral pseudoaneurysm and  had to have that surgically repaired. Follow-up echocardiogram in 2008 showed her left  ventricular function had returned to normal. She then presented on July 13, 2011, with  increasing shortness of breath and was found to have some mild congestive heart failure,  atrial flutter, and marked bradycardia. She had a repeat echocardiogram again that  confirmed normal left ventricular systolic function. She had a dual-chamber pacemaker  implanted. She continued to have episodes of atrial flutter with symptomatic dyspnea. She  underwent atrial flutter ablation in October 2011.  She then presented in October of 2012 with dyspnea, thought that this might be heart  failure, anginal equivalence. She underwent cardiac catheterization which showed elevated  right-sided pressure with a PA systolic pressure of 53 mmHg, mean of 31 mmHg, but elevated  wedge pressure at 26. She was also found to have in-stent stenosis of the diagonal vessel.  Other vessels were free of disease with the exception of total occlusion of the mid left  anterior descending which was unchanged. A left ventricular ejection fraction of  approximately 50%. She underwent angioplasty and drug-eluting stent placement of the  diagonal vessel. Prinivil was increased. We increased her Lasix to 30 mg a day however,  she developed dizziness on that and we had to cut her back to 20 mg of Lasix a day.  She unfortunately has had a couple of episodes of passing out. It  turns out that was from low   blood pressure, and they resolved with adjusting her medications.  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 She ended up being admitted and had the device removed and cleaned  out. During that hospital stay she was found to have a nonfunctioning RV lead. She was  stable and in view of the infection, it was decided not to go and replace the lead at that  time.  She then in 10/2014 had her RV lead revised.   Then in November 2016 was having some increased shortness of breath had a perfusion stress test that showed a very small area of ischemia in the septal wall her echocardiogram showed a left ventricular ejection fraction of approximately 45% which was about her baseline no real significant valvular disease.  She isn't subsequently diagnosed as having significant gastroesophageal reflux with aspiration and been treated for that.      Plan:    1.  Possible septic right knee.  The current plan I believe his to drain this if infected may or may not need surgery.  Hiatal we are holding warfarin would start heparin if her INR gets below 2.  2.  History of coronary artery disease with previous occlusion of the mid left anterior descending stenting of the first diagonal with a drug-eluting stent catheterization October 2012 restenosed the diagonal vessel with repeat drug-eluting stent placement of that left ventricular ejection fraction 50%.  Echocardiogram showed left ventricular ejection fraction of 23%.  3.  Ischemic cardio myopathy as above.  4.  Paroxysmal atrial fibrillation/sick sinus and now status post permanent pacemaker implantation status post atrial flutter ablation status post RV lead revision chads 2 vascular score 6 high risk for embolic event as above.  5.  Chronic dyspnea 6 underlying heart and lung disease as well as maybe a paralyzed left hemidiaphragm.  6.  History of diabetes mellitus.  7.  History of  hypertension follow blood pressure  8.  History of hyperlipidemia intolerant of multiple statins.  9.  History of obstructive sleep apnea and pulmonary hypertension.

## 2018-01-15 NOTE — CONSULTS
"DOS: 1/15/2018  NAME: Caitlyn Longoria   : 1934  PCP: Zenaida Suarez MD  CC: Stroke  Referring MD: Jv Joseph*    Neurological Problem and Interval History:  83 y.o. RHW female with a DM, HTN, Afib, CHF, CAD and recent hospitalization for aphasia. History is provided by patient, RN  and review of records. She presented on 18 with right knee pain, swelling and redness that had been present for the past 4 days. We were asked to see for clearance for possible surgery due to recent presumed stroke. She denies any associated fevers, chills, N/V/D. She does state she had about a week of double vision after being discharged from the hospital in December that resolved. 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 She also c/o of pain in the right knee and being unable to bear weight secondary to the pain.  She denies any other stroke/TIA symptoms. She was seen in Dec. 2017 for recurrent aphasia. 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 She did not have significant stenosis by ultrasound and she was discharged on ASA along with her anticoagulation. She lives independently, takes care of all her finances and continues to drive.    Past Medical/Surgical Hx:  Past Medical History:   Diagnosis Date   • Abdominal pain    • Cough    • Diarrhea    • Difficulty breathing    • Dizziness    • DM type 2 (diabetes mellitus, type 2)      Past Surgical History:   Procedure Laterality Date   • CARDIAC CATHETERIZATION     • CHOLECYSTECTOMY     • CORONARY STENT PLACEMENT     • HERNIA REPAIR     • HYSTERECTOMY     • PACEMAKER IMPLANTATION     • REPLACEMENT TOTAL KNEE         Review of Systems:        A complete review of all systems is negative except as described above    Medications On Admission  Prescriptions Prior to Admission   Medication " Sig Dispense Refill Last Dose   • acetaminophen (TYLENOL) 500 MG tablet Take 500 mg by mouth Every 6 (Six) Hours As Needed for Mild Pain .      • Acetylcysteine (N-ACETYL-L-CYSTEINE) 600 MG capsule Take 600 mg by mouth Daily With Dinner.      • amitriptyline (ELAVIL) 25 MG tablet Take 25 mg by mouth Every Night.      • aspirin 81 MG chewable tablet Chew 81 mg Daily.      • azelastine (ASTEPRO) 0.15 % solution nasal spray 2 sprays into each nostril 2 (Two) Times a Day.   Past Week at Unknown time   • azelastine (OPTIVAR) 0.05 % ophthalmic solution Administer 1 drop to both eyes 2 (Two) Times a Day.   Past Week at Unknown time   • baclofen (LIORESAL) 10 MG tablet Take 10 mg by mouth 3 (Three) Times a Day As Needed.      • Biotin 2.5 MG capsule Take 1 capsule by mouth Daily.      • bumetanide (BUMEX) 1 MG tablet Take 1 mg by mouth Every Morning.   12/9/2017 at Unknown time   • Calcium Carb-Cholecalciferol (CALCIUM-VITAMIN D) 500-200 MG-UNIT per tablet Take 1 tablet by mouth 2 (Two) Times a Day.      • carvedilol (COREG) 3.125 MG tablet Take 1 tablet by mouth Every 12 (Twelve) Hours for 30 days. 180 tablet 0    • cetirizine (ZyrTEC) 10 MG tablet Take 10 mg by mouth Daily.   12/9/2017 at Unknown time   • ciclopirox (LOPROX) 0.77 % cream Apply 1 application topically Daily As Needed.      • clobetasol (TEMOVATE) 0.05 % external solution Apply 1 application topically At Night As Needed.      • clotrimazole-betamethasone (LOTRISONE) 1-0.05 % cream Apply 1 application topically 2 (Two) Times a Day As Needed.      • Cyanocobalamin 1000 MCG/ML kit Inject  as directed Every 30 (Thirty) Days.      • doxycycline (VIBRAMYICN) 100 MG tablet Take 100 mg by mouth Every Morning Before Breakfast.      • dry mouth gel (BIOTENE ORALBALANCE) gel Apply  to the mouth or throat As Needed for Dry Mouth.      • flunisolide (NASALIDE) 25 MCG/ACT (0.025%) solution nasal spray Inhale 2 sprays Every 12 (Twelve) Hours.   Past Week at Unknown time    • glipiZIDE (GLUCOTROL) 5 MG tablet Take 5 mg by mouth Every Morning.   12/9/2017 at Unknown time   • ketoconazole (NIZORAL) 2 % shampoo Apply  topically 2 (Two) Times a Week.      • loperamide (IMODIUM) 2 MG capsule Take 2 mg by mouth Daily.      • losartan (COZAAR) 25 MG tablet Take 1 tablet by mouth Daily. 90 tablet 0    • Melatonin 3 MG tablet Take 3 mg by mouth Every Night.   Past Week at Unknown time   • Omega-3 Fatty Acids (FISH OIL) 1000 MG capsule capsule Take 1,000 mg by mouth 2 (Two) Times a Day With Meals.      • oxazepam (SERAX) 10 MG capsule Take 10 mg by mouth Every Night.   12/8/2017 at Unknown time   • oxybutynin XL (DITROPAN-XL) 10 MG 24 hr tablet Take 10 mg by mouth Daily.      • pantoprazole (PROTONIX) 40 MG EC tablet Take 40 mg by mouth 2 (Two) Times a Day With Meals.   12/9/2017 at Unknown time   • raNITIdine (ZANTAC) 300 MG tablet Take 300 mg by mouth Every Night.      • rosuvastatin (CRESTOR) 10 MG tablet Take 20 mg by mouth Daily.      • saccharomyces boulardii (FLORASTOR) 250 MG capsule Take 250 mg by mouth 2 (Two) Times a Day.      • warfarin (COUMADIN) 4 MG tablet Take 4 mg by mouth Daily.   12/8/2017 at Unknown time       Allergies:  Allergies   Allergen Reactions   • Ahist [Chlorpheniramine] Nausea Only, Other (See Comments) and Dizziness     Headache, Blurred vision   • Amoxicillin Other (See Comments)     Yeast infection   • Clarithromycin Nausea Only     Other reaction(s): Headache, Depression, Flushed   • Diclofenac      Voltaren   • Diphenhydramine      Benadryl   • Esomeprazole      Nexium. Stomach issues   • Ibuprofen Other (See Comments)     Does not recall    • Levalbuterol Swelling     Xopenex   • Levocetirizine Other (See Comments)     Xyzal. Diarrhea, Stomach cramps   • Lipitor [Atorvastatin] Other (See Comments)     Muscle pain and weakness, dark urine   • Lodine [Etodolac]    • Omeprazole Nausea Only     Prilosec. Headache   • Pravachol [Pravastatin] Nausea Only and  Other (See Comments)     Bloated, Constipation, Headaches   • Quinapril Swelling and Confusion     Accupril. Headaches, constipation   • Sulfa Antibiotics    • Sulindac      Other reaction(s): Headache, joint pain, bruising   • Valdecoxib Irritability   • Prednisone Rash       Social Hx:  Social History     Social History   • Marital status: Single     Spouse name: N/A   • Number of children: N/A   • Years of education: N/A     Occupational History   • Not on file.     Social History Main Topics   • Smoking status: Never Smoker   • Smokeless tobacco: Never Used   • Alcohol use No   • Drug use: No   • Sexual activity: Not on file     Other Topics Concern   • Not on file     Social History Narrative       Family Hx:  History reviewed. No pertinent family history.    Review of Imaging:  CTA h/n 12/7/17: IMPRESSION:  1. No evidence of acute infarction or hemorrhage. Vascular  calcification, small vessel ischemic disease and age-appropriate  atrophy.  2. There is atherosclerotic disease involving the carotid bifurcations  bilaterally more irregular on the left where there is a mild (stenosis  of the distal aspect of the common carotid artery (30%-40%) but with 0%  stenosis of the internal carotid artery using NASCET criteria. There is  no evidence of major intracranial arterial stenosis or occlusion. A CT  perfusion showed no convincing perfusion abnormality but was limited.  There is a suggestion of a moderate stenosis at the origin of the right  vertebral artery. See above.    Carotid US 12/8/17: Interpretation Summary   · Proximal right internal carotid artery is normal plaque without significant stenosis.  · Proximal left internal carotid artery is normal, plaque without significant stenosis.  · Mid left internal carotid artery is normal.  · Distal left internal carotid artery is normal.  · All other left side carotid system vessels are normal.  · Mid right internal carotid artery is normal.  · Distal right internal  "carotid artery is normal.  · All other right side carotid system vessels are normal.       Laboratory Results:   Lab Results   Component Value Date    GLUCOSE 122 (H) 01/15/2018    CALCIUM 8.2 (L) 01/15/2018     (H) 01/15/2018    K 3.7 01/15/2018    CO2 29.1 (H) 01/15/2018     01/15/2018    BUN 23 01/15/2018    CREATININE 1.03 (H) 01/15/2018    EGFRIFNONA 51 (L) 01/15/2018    BCR 22.3 01/15/2018    ANIONGAP 12.9 01/15/2018     Lab Results   Component Value Date    WBC 6.17 01/15/2018    HGB 10.9 (L) 01/15/2018    HCT 34.9 (L) 01/15/2018    .5 (H) 01/15/2018     01/15/2018     Lab Results   Component Value Date    LDLCALC 77 12/08/2017     Lab Results   Component Value Date    HGBA1C 6.60 (H) 12/08/2017     Lab Results   Component Value Date    INR 2.44 (H) 01/15/2018    INR 2.32 (H) 01/14/2018    INR 2.38 (H) 12/10/2017    PROTIME 25.7 (H) 01/15/2018    PROTIME 24.7 (H) 01/14/2018    PROTIME 25.2 (H) 12/10/2017         Physical Examination:  BP 96/59 (BP Location: Left arm, Patient Position: Lying)  Pulse 80  Temp 98 °F (36.7 °C) (Oral)   Resp 16  Ht 162.6 cm (64\")  Wt 90.7 kg (200 lb)  SpO2 96%  BMI 34.33 kg/m2  General Appearance:   Well developed, obese, well groomed, alert, and cooperative.  HEENT: Normocephalic.    Neck and Spine: Normal range of motion.  Normal alignment. No mass or tenderness.   Cardiac: Regular rate and rhythm.   Peripheral Vasculature: Radial pulses are equal and symmetric. No signs of distal embolization.  Extremities:    Right knee warm to touch, mild edema.  Limited ROM.  Skin:    No rashes or birth marks.    Neurological examination:  Higher Integrative  Function: Oriented to time, place and person. Normal registration, recall, attention span and concentration. Normal language including comprehension, spontaneous speech, repetition, reading, writing, naming and vocabulary. No neglect. Normal fund of knowledge and higher integrative function.  CN " "II: Pupils are equal, round, and reactive to light. Normal visual acuity and visual fields.    CN III IV VI: Extraocular movements are full without nystagmus.   CN V: Normal facial sensation and strength of muscles of mastication.  CN VII: Facial movements are symmetric. No weakness.  CN VIII:   Auditory acuity is normal.  CN IX & X:   Symmetric palatal movement.  CN XI: Sternocleidomastoid and trapezius are normal.  No weakness.  CN XII:   The tongue is midline.  No atrophy or fasciculations.  Motor: Normal muscle strength, bulk and tone in upper and lower extremities.  No fasciculations, rigidity, spasticity, or abnormal movements.  Sensation: Normal to light touch in arms and legs. Normal graphesthesia and no extinction on DSS.  Station and Gait: Deferred gait testing due to painful knee    Coordination: Finger to nose test shows no dysmetria.   Heel to shin on left is normal.  NIHSS: 0    Diagnoses / Discussion:  83 y.o. who presented on 1/14 with right knee effusion and is followed by Dr. Hunter. We were asked to see regarding her recent admission in December for aphasia and presumed stroke. She is unable to get an MRI secondary to pacer; however, she was found to have a ulcerated plaque in the left common carotid artery. I discussed her previous imaging with Dr. Valdez. She does report having had an episode of \"double vision\" just after her discharge but has otherwise remained neurologically at her baseline.There is no need for further imaging. If she were to need surgical intervention we would recommend that she be bridged with heparin and that she be off of her antiplatelet and anticoagulation for the least amount of time possible. Would also avoid hypotension and minimize any perioperative sedation. Will check an aspirin response. No further work-up needed. Please call with questions or concerns.     Discussed above with Dr. Valdez, JEANNINE and patient.     Coding    Dictated using Dragon dictation.            "

## 2018-01-15 NOTE — DISCHARGE PLACEMENT REQUEST
"Caitlyn Longoria (83 y.o. Female)     Date of Birth Social Security Number Address Home Phone MRN    1934  [id number] [address] [phone] [id number]    Anabaptist Marital Status          Voodoo Single       Admission Date Admission Type Admitting Provider Attending Provider Department, Room/Bed    1/14/18 Emergency Jv Joseph MD Ray, Jonathan, MD [address], P887/1    Discharge Date Discharge Disposition Discharge Destination                      Attending Provider: Shamir Wilkins MD     Allergies:  Ahist [Chlorpheniramine], Amoxicillin, Clarithromycin, Diclofenac, Diphenhydramine, Esomeprazole, Ibuprofen, Levalbuterol, Levocetirizine, Lipitor [Atorvastatin], Lodine [Etodolac], Omeprazole, Pravachol [Pravastatin], Quinapril, Sulfa Antibiotics, Sulindac, Valdecoxib, Prednisone    Isolation:  None   Infection:  None   Code Status:  FULL    Ht:  162.6 cm (64\")   Wt:  90.7 kg (200 lb)    Admission Cmt:  None   Principal Problem:  Infection of right knee [M00.9]                 Active Insurance as of 1/14/2018     Primary Coverage     Payor Plan Insurance Group Employer/Plan Group    MEDICARE MEDICARE A & B      Payor Plan Address Payor Plan Phone Number Effective From Effective To    [address] [phone] 10/1/1999     [place] [address]       Subscriber Name Subscriber Birth Date Member ID       CAITLYN LONGORIA 1934 [id number]           Secondary Coverage     Payor Plan Insurance Group Employer/Plan Group    AARP MED SUPP AAR HEALTH CARE OPTIONS PLAN F     Payor Plan Address Payor Plan Phone Number Effective From Effective To    [facility] [phone] 1/1/2015     [address]       [place] [address]       Subscriber Name Subscriber Birth Date Member ID       CAITLYN LONGORIA 1934 [id number]                 Emergency Contacts      (Rel.) Home Phone Work Phone Mobile Phone    PrietoTrae muhammad (Brother) " [phone] -- [phone]    Zoey Adair (Relative) [phone] -- --    Inez Longoria (Sister) [phone] -- --

## 2018-01-15 NOTE — PROGRESS NOTES
Name: Caitlyn Longoria ADMIT: 2018   : 1934  PCP: Zenaida Suarez MD    MRN: [id number] LOS: 1 days   AGE/SEX: 83 y.o. female  ROOM: [facility]     Subjective   Subjective:  Symptoms:  No shortness of breath or chest pain.    Diet:  Adequate intake.  No nausea.    Pain:  She reports no pain.    Knee pain better. Denies any other complaints.    Objective   Vital Signs  Temp:  [97.3 °F (36.3 °C)-98.5 °F (36.9 °C)] 98 °F (36.7 °C)  Heart Rate:  [76-81] 80  Resp:  [16] 16  BP: ()/(59-76) 96/59  SpO2:  [93 %-99 %] 96 %  on   ;   O2 Device: room air  Body mass index is 34.33 kg/(m^2).    Physical Exam   Constitutional: She is oriented to person, place, and time. She appears well-developed. She is cooperative. No distress.   Neck: No JVD present. No tracheal deviation present. No thyromegaly present.   Cardiovascular: Normal rate and regular rhythm.    Murmur heard.  Pulmonary/Chest: Effort normal and breath sounds normal. No respiratory distress.   Abdominal: Soft. Normal appearance and bowel sounds are normal. She exhibits no distension. There is no tenderness.   Musculoskeletal: She exhibits edema.   The right knee is mildly erythematous and warm to the touch.  She has edema surrounding the joint and pain with range of motion.    Neurological: She is alert and oriented to person, place, and time.   Skin: Skin is warm and dry. No rash noted.   Psychiatric: She has a normal mood and affect. Her behavior is normal.   Nursing note and vitals reviewed.      Results Review:       I reviewed the patient's new clinical results.    Results from last 7 days  Lab Units 01/15/18  0423 18  1338   WBC 10*3/mm3 6.17 7.35   HEMOGLOBIN g/dL 10.9* 12.1   PLATELETS 10*3/mm3 162 185       Results from last 7 days  Lab Units 01/15/18  0423 18  1338   SODIUM mmol/L 146* 140   POTASSIUM mmol/L 3.7 3.9   CHLORIDE mmol/L 104 99   CO2 mmol/L 29.1* 27.1   BUN mg/dL 23 26*   CREATININE mg/dL 1.03* 1.02*    GLUCOSE mg/dL 122* 122*   ALBUMIN g/dL  --  3.70   BILIRUBIN mg/dL  --  0.9   ALK PHOS U/L  --  89   AST (SGOT) U/L  --  20   ALT (SGPT) U/L  --  18   Estimated Creatinine Clearance: 45.1 mL/min (by C-G formula based on Cr of 1.03).    Results from last 7 days  Lab Units 01/15/18  0423 01/14/18  1338   CALCIUM mg/dL 8.2* 8.8   ALBUMIN g/dL  --  3.70         Results from last 7 days  Lab Units 01/14/18  1338   LACTATE mmol/L 1.5   SED RATE mm/hr 34*   CRP mg/dL 1.99*       Results from last 7 days  Lab Units 01/14/18  1411 01/14/18  1338   BLOODCX  No growth at less than 24 hours No growth at 24 hours       Results from last 7 days  Lab Units 01/15/18  0423 01/14/18  1338   PROTIME Seconds 25.7* 24.7*   INR  2.44* 2.32*     Glucose   Date/Time Value Ref Range Status   01/15/2018 1048 140 (H) 70 - 130 mg/dL Final   01/15/2018 0623 114 70 - 130 mg/dL Final   01/14/2018 2035 105 70 - 130 mg/dL Final         amitriptyline 25 mg Oral Nightly   carvedilol 3.125 mg Oral Q12H   famotidine 40 mg Oral Nightly   fluticasone 2 spray Each Nare Daily   insulin aspart 0-7 Units Subcutaneous 4x Daily With Meals & Nightly   loperamide 2 mg Oral Daily   oxazepam 10 mg Oral Nightly   oxybutynin XL 10 mg Oral Daily   pantoprazole 40 mg Oral BID With Meals   piperacillin-tazobactam 3.375 g Intravenous Q8H   rosuvastatin 20 mg Oral Daily   saccharomyces boulardii 250 mg Oral BID   vancomycin 1,500 mg Intravenous Q24H       Pharmacy to dose vancomycin    Pharmacy to Dose Zosyn    Diet Regular; Consistent Carbohydrate      Assessment/Plan   Assessment:     Active Hospital Problems (** Indicates Principal Problem)    Diagnosis Date Noted   • **Infection of right knee [M00.9] 01/14/2018   • Hypernatremia [E87.0] 01/15/2018   • Chronic combined systolic and diastolic congestive heart failure [I50.42] 01/15/2018   • HTN (hypertension) [I10] 01/14/2018   • CKD (chronic kidney disease) stage 3, GFR 30-59 ml/min [N18.3] 01/14/2018   • CVA  (cerebral vascular accident) [I63.9] 12/10/2017   • DM type 2 (diabetes mellitus, type 2) [E11.9] 12/07/2017   • Atrial fibrillation [I48.91] 12/07/2017   • CAD (coronary artery disease), hx of stent [I25.10] 12/07/2017      Resolved Hospital Problems    Diagnosis Date Noted Date Resolved   No resolved problems to display.       · Hold losartan  · Follow up fluid studies. Empiric antibiotics ordered.  · Blood sugars stable.  · Appreciate consultants help.        Shamir Wilkins MD  [facility]  01/15/18  3:00 PM

## 2018-01-16 PROBLEM — E87.0 HYPERNATREMIA: Status: RESOLVED | Noted: 2018-01-15 | Resolved: 2018-01-16

## 2018-01-16 LAB
ANION GAP SERPL CALCULATED.3IONS-SCNC: 13.5 MMOL/L
BUN BLD-MCNC: 25 MG/DL (ref 8–23)
BUN/CREAT SERPL: 22.5 (ref 7–25)
CALCIUM SPEC-SCNC: 8.2 MG/DL (ref 8.6–10.5)
CHLORIDE SERPL-SCNC: 104 MMOL/L (ref 98–107)
CO2 SERPL-SCNC: 25.5 MMOL/L (ref 22–29)
CREAT BLD-MCNC: 1.11 MG/DL (ref 0.57–1)
CRP SERPL-MCNC: 1.38 MG/DL (ref 0–0.5)
DEPRECATED RDW RBC AUTO: 57.8 FL (ref 37–54)
ERYTHROCYTE [DISTWIDTH] IN BLOOD BY AUTOMATED COUNT: 15.4 % (ref 11.7–13)
GFR SERPL CREATININE-BSD FRML MDRD: 47 ML/MIN/1.73
GLUCOSE BLD-MCNC: 139 MG/DL (ref 65–99)
GLUCOSE BLDC GLUCOMTR-MCNC: 121 MG/DL (ref 70–130)
GLUCOSE BLDC GLUCOMTR-MCNC: 131 MG/DL (ref 70–130)
GLUCOSE BLDC GLUCOMTR-MCNC: 157 MG/DL (ref 70–130)
GLUCOSE BLDC GLUCOMTR-MCNC: 160 MG/DL (ref 70–130)
HCT VFR BLD AUTO: 33.2 % (ref 35.6–45.5)
HGB BLD-MCNC: 10.4 G/DL (ref 11.9–15.5)
INR PPP: 1.84 (ref 0.9–1.1)
MCH RBC QN AUTO: 32.1 PG (ref 26.9–32)
MCHC RBC AUTO-ENTMCNC: 31.3 G/DL (ref 32.4–36.3)
MCV RBC AUTO: 102.5 FL (ref 80.5–98.2)
PLATELET # BLD AUTO: 170 10*3/MM3 (ref 140–500)
PMV BLD AUTO: 9.9 FL (ref 6–12)
POTASSIUM BLD-SCNC: 3.8 MMOL/L (ref 3.5–5.2)
PROTHROMBIN TIME: 20.6 SECONDS (ref 11.7–14.2)
RBC # BLD AUTO: 3.24 10*6/MM3 (ref 3.9–5.2)
SODIUM BLD-SCNC: 143 MMOL/L (ref 136–145)
WBC NRBC COR # BLD: 6.33 10*3/MM3 (ref 4.5–10.7)

## 2018-01-16 PROCEDURE — 85027 COMPLETE CBC AUTOMATED: CPT | Performed by: HOSPITALIST

## 2018-01-16 PROCEDURE — 25010000002 CEFAZOLIN PER 500 MG: Performed by: INTERNAL MEDICINE

## 2018-01-16 PROCEDURE — 25010000002 PIPERACILLIN SOD-TAZOBACTAM PER 1 G: Performed by: INTERNAL MEDICINE

## 2018-01-16 PROCEDURE — 63710000001 INSULIN ASPART PER 5 UNITS: Performed by: INTERNAL MEDICINE

## 2018-01-16 PROCEDURE — 85610 PROTHROMBIN TIME: CPT | Performed by: INTERNAL MEDICINE

## 2018-01-16 PROCEDURE — 80048 BASIC METABOLIC PNL TOTAL CA: CPT | Performed by: HOSPITALIST

## 2018-01-16 PROCEDURE — 82962 GLUCOSE BLOOD TEST: CPT

## 2018-01-16 PROCEDURE — 94799 UNLISTED PULMONARY SVC/PX: CPT

## 2018-01-16 PROCEDURE — 99223 1ST HOSP IP/OBS HIGH 75: CPT | Performed by: INTERNAL MEDICINE

## 2018-01-16 PROCEDURE — 86140 C-REACTIVE PROTEIN: CPT | Performed by: INTERNAL MEDICINE

## 2018-01-16 PROCEDURE — 99233 SBSQ HOSP IP/OBS HIGH 50: CPT | Performed by: INTERNAL MEDICINE

## 2018-01-16 RX ORDER — WARFARIN SODIUM 6 MG/1
6 TABLET ORAL
Status: COMPLETED | OUTPATIENT
Start: 2018-01-16 | End: 2018-01-16

## 2018-01-16 RX ADMIN — TAZOBACTAM SODIUM AND PIPERACILLIN SODIUM 3.38 G: 375; 3 INJECTION, SOLUTION INTRAVENOUS at 06:32

## 2018-01-16 RX ADMIN — WARFARIN SODIUM 6 MG: 6 TABLET ORAL at 17:17

## 2018-01-16 RX ADMIN — CEFAZOLIN 2 G: 10 INJECTION, POWDER, FOR SOLUTION INTRAVENOUS at 21:31

## 2018-01-16 RX ADMIN — INSULIN ASPART 2 UNITS: 100 INJECTION, SOLUTION INTRAVENOUS; SUBCUTANEOUS at 12:27

## 2018-01-16 RX ADMIN — INSULIN ASPART 2 UNITS: 100 INJECTION, SOLUTION INTRAVENOUS; SUBCUTANEOUS at 21:31

## 2018-01-16 RX ADMIN — CARVEDILOL 3.12 MG: 3.12 TABLET, FILM COATED ORAL at 21:30

## 2018-01-16 RX ADMIN — CEFAZOLIN 2 G: 10 INJECTION, POWDER, FOR SOLUTION INTRAVENOUS at 12:27

## 2018-01-16 RX ADMIN — FLUTICASONE PROPIONATE 2 SPRAY: 50 SPRAY, METERED NASAL at 08:37

## 2018-01-16 RX ADMIN — PANTOPRAZOLE SODIUM 40 MG: 40 TABLET, DELAYED RELEASE ORAL at 08:37

## 2018-01-16 RX ADMIN — OXYBUTYNIN CHLORIDE 10 MG: 5 TABLET, EXTENDED RELEASE ORAL at 21:30

## 2018-01-16 RX ADMIN — ROSUVASTATIN CALCIUM 20 MG: 20 TABLET, FILM COATED ORAL at 08:37

## 2018-01-16 RX ADMIN — FAMOTIDINE 40 MG: 40 TABLET, FILM COATED ORAL at 21:30

## 2018-01-16 RX ADMIN — OXAZEPAM 10 MG: 10 CAPSULE, GELATIN COATED ORAL at 21:30

## 2018-01-16 RX ADMIN — CARVEDILOL 3.12 MG: 3.12 TABLET, FILM COATED ORAL at 08:37

## 2018-01-16 RX ADMIN — AMITRIPTYLINE HYDROCHLORIDE 25 MG: 25 TABLET, FILM COATED ORAL at 21:30

## 2018-01-16 RX ADMIN — Medication 250 MG: at 21:30

## 2018-01-16 RX ADMIN — Medication 250 MG: at 08:37

## 2018-01-16 RX ADMIN — PANTOPRAZOLE SODIUM 40 MG: 40 TABLET, DELAYED RELEASE ORAL at 17:17

## 2018-01-16 NOTE — DISCHARGE PLACEMENT REQUEST
"Caitlyn Longoria (83 y.o. Female)     Date of Birth Social Security Number Address Home Phone MRN    1934  [address] [address] [phone] [id number]    Mandaeism Marital Status          Taoist Single       Admission Date Admission Type Admitting Provider Attending Provider Department, Room/Bed    1/14/18 Emergency Jv Joseph MD Ray, Jonathan, MD [address], P887/1    Discharge Date Discharge Disposition Discharge Destination                      Attending Provider: Shamir Wilkins MD     Allergies:  Ahist [Chlorpheniramine], Clarithromycin, Diclofenac, Diphenhydramine, Esomeprazole, Ibuprofen, Levalbuterol, Levocetirizine, Lipitor [Atorvastatin], Lodine [Etodolac], Omeprazole, Pravachol [Pravastatin], Quinapril, Sulfa Antibiotics, Sulindac, Valdecoxib, Prednisone    Isolation:  None   Infection:  None   Code Status:  FULL    Ht:  162.6 cm (64\")   Wt:  90.7 kg (200 lb)    Admission Cmt:  None   Principal Problem:  Infection of right knee [M00.9]                 Active Insurance as of 1/14/2018     Primary Coverage     Payor Plan Insurance Group Employer/Plan Group    MEDICARE MEDICARE A & B      Payor Plan Address Payor Plan Phone Number Effective From Effective To    [address] [phone] 10/1/1999     [place] [address]       Subscriber Name Subscriber Birth Date Member ID       CAITLYN LONGORIA 1934 [id number]           Secondary Coverage     Payor Plan Insurance Group Employer/Plan Group    AARP MED SUPP AAR HEALTH CARE OPTIONS PLAN F     Payor Plan Address Payor Plan Phone Number Effective From Effective To    [facility] [phone] 1/1/2015     [address]       [place] [address]       Subscriber Name Subscriber Birth Date Member ID       CAITLYN LONGORIA 1934 [id number]                 Emergency Contacts      (Rel.) Home Phone Work Phone Mobile Phone    Trae Longoria (Brother) [phone] " -- [phone]    Zoey Adair (Relative) [phone] -- --    Inez Longoria (Sister) [phone] -- --

## 2018-01-16 NOTE — CONSULTS
Referring Provider: Jv Joseph MD  [id number] [facility] [address]  [place] [address]    Reason for Consultation: concern for R knee infection    History of present illness:  Mrs Longoria is a 82 YO who I am asked to evaluate and give opinion for concern for R knee infection. History is obtained from the patient and review of the old medical records which I summarize/synthesize as follows: She had her knee replaced initially in 2002 at another hospital. Then in 2014 she had an MSSA infection requiring a liner exchange which was done here at [facility]. She says she was on an IV antibiotic at rehab for 6 weeks and then has been on doxycycline ever since. Initially it was 100 mg PO BID and about a year ago changed to 100 mg PO daily.     She says that about 1 week ago she had sudden onset sharp constant pain of the R knee with associated warmth, erythema, and swelling. She said it was similar the infection in 2014 though not quite as severe. She does not recall any trauma. She reports a chill but no known fever.    She came to the ER and was immediately placed on vancomycin and Zosyn. She received several doses of each prior to an arthrocentesis being done that showed 8,000 WBCs (80% PMNs). She says that with leg elevation and antibiotics her pain, erythema, and swelling are all improved. Her knee culture is still pending.      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 Amoxicillin - yeast infection which is not an allergy so I am removing it from her list  2. Sulfa  3. Clarithromycin - headache    Medications:    Current Facility-Administered Medications:   •  acetaminophen (TYLENOL) tablet 650 mg, 650 mg, Oral, Q6H PRN, Shamir Wilkins MD  •  amitriptyline (ELAVIL) tablet 25 mg,  25 mg, Oral, Nightly, Jv Joseph MD, 25 mg at 01/15/18 2034  •  carvedilol (COREG) tablet 3.125 mg, 3.125 mg, Oral, Q12H, Jv Joseph MD, 3.125 mg at 01/16/18 0837  •  dextrose (D50W) solution 25 g, 25 g, Intravenous, Q15 Min PRN, Jv Joseph MD  •  dextrose (GLUTOSE) oral gel 15 g, 15 g, Oral, Q15 Min PRN, Jv Joseph MD  •  famotidine (PEPCID) tablet 40 mg, 40 mg, Oral, Nightly, Jv Joseph MD, 40 mg at 01/15/18 2034  •  fluticasone (FLONASE) 50 MCG/ACT nasal spray 2 spray, 2 spray, Each Nare, Daily, Jv Joseph MD, 2 spray at 01/16/18 0837  •  glucagon (human recombinant) (GLUCAGEN DIAGNOSTIC) injection 1 mg, 1 mg, Subcutaneous, Q15 Min PRN, Jv Joseph MD  •  insulin aspart (novoLOG) injection 0-7 Units, 0-7 Units, Subcutaneous, 4x Daily With Meals & Nightly, Jv Joseph MD, 2 Units at 01/15/18 2315  •  loperamide (IMODIUM) capsule 2 mg, 2 mg, Oral, Daily, Jv Joseph MD, 2 mg at 01/15/18 0815  •  morphine injection 2 mg, 2 mg, Intravenous, Q3H PRN **AND** naloxone (NARCAN) injection 0.4 mg, 0.4 mg, Intravenous, Q5 Min PRN, Jv Joseph MD  •  ondansetron (ZOFRAN) tablet 4 mg, 4 mg, Oral, Q6H PRN **OR** ondansetron ODT (ZOFRAN-ODT) disintegrating tablet 4 mg, 4 mg, Oral, Q6H PRN **OR** ondansetron (ZOFRAN) injection 4 mg, 4 mg, Intravenous, Q6H PRN, Jv Joseph MD  •  oxazepam (SERAX) capsule 10 mg, 10 mg, Oral, Nightly, Jv Joseph MD, 10 mg at 01/15/18 2034  •  oxybutynin XL (DITROPAN-XL) 24 hr tablet 10 mg, 10 mg, Oral, Daily, Jv Joseph MD, 10 mg at 01/15/18 2034  •  pantoprazole (PROTONIX) EC tablet 40 mg, 40 mg, Oral, BID With Meals, Jv Joseph MD, 40 mg at 01/16/18 0837  •  Pharmacy to dose vancomycin, , Does not apply, Continuous PRN, Jv Joseph MD  •  Pharmacy to Dose Zosyn, , Does not apply,  Continuous PRN, Jv Joseph MD  •  piperacillin-tazobactam (ZOSYN) 3.375 g in iso-osmotic dextrose 50 ml (premix), 3.375 g, Intravenous, Q8H, Jv Joseph MD, 3.375 g at 01/16/18 0632  •  rosuvastatin (CRESTOR) tablet 20 mg, 20 mg, Oral, Daily, Jv Joseph MD, 20 mg at 01/16/18 0837  •  saccharomyces boulardii (FLORASTOR) capsule 250 mg, 250 mg, Oral, BID, Jv Joseph MD, 250 mg at 01/16/18 0837  •  vancomycin 1500 mg/500 mL 0.9% NS IVPB ([facility]), 1,500 mg, Intravenous, Q24H, Jv Joseph MD, 1,500 mg at 01/15/18 1527  •  warfarin (COUMADIN) tablet 6 mg, 6 mg, Oral, Once, Rommel Veliz MD      Review of Systems  All systems were reviewed and are negative unless otherwise stated above in the HPI    Objective   Vital Signs   Temp:  [97.4 °F (36.3 °C)-98.5 °F (36.9 °C)] 98 °F (36.7 °C)  Heart Rate:  [78-85] 80  Resp:  [16] 16  BP: ()/(59-81) 131/81    Physical Exam:   General: awake, alert, NAD, very nice   Head: Normocephalic, atraumatic  Eyes: PERRL, EOMI, no scleral icterus, no conjunctival pallor, no conjunctival hemorrhages.   ENT: MMM, OP clear, no thrush. Fair dentition.   Neck: Supple, no visible thyromegaly  Cardiovascular: NR, RR, 2/6 KELLIE at LUSB; trace LE edema  Respiratory: Lungs are clear to ascultation bilaterally, no rales or wheezing; normal work of breathing on ambient air  GI: Abdomen is obese, soft, non-tender, non-distended, normal bowel sounds in all four quadrants; no hepatosplenomegaly, no masses palpated  : no Mancilla catheter present  Musculoskeletal: R knee incision is healed; knee is warm on the lateral portion; there is no current erythema; there is a moderate joint effusion  Skin: No rashes, lesions, or embolic phenomenon  Neurological: Alert and oriented x 3, cranial nerves 2-12 grossly intact, motor strength 5/5 in all four extremities  Psychiatric: Normal mood and affect   Lymph: no pre-auricular, post-auricular,  submandibular, cervical, supraclavicular  LAD  Vasc: no cyanosis; PIV w/o erythema    Labs:     Lab Results   Component Value Date    WBC 6.33 01/16/2018    HGB 10.4 (L) 01/16/2018    HCT 33.2 (L) 01/16/2018    .5 (H) 01/16/2018     01/16/2018       Lab Results   Component Value Date    GLUCOSE 139 (H) 01/16/2018    BUN 25 (H) 01/16/2018    CREATININE 1.11 (H) 01/16/2018    EGFRIFNONA 47 (L) 01/16/2018    BCR 22.5 01/16/2018    CO2 25.5 01/16/2018    CALCIUM 8.2 (L) 01/16/2018    PROTENTOTREF 4.9 (L) 04/08/2014    ALBUMIN 3.70 01/14/2018    LABIL2 1.3 01/14/2018    AST 20 01/14/2018    ALT 18 01/14/2018     Lab Results   Component Value Date    SEDRATE 34 (H) 01/14/2018     Lab Results   Component Value Date    CRP 1.99 (H) 01/14/2018     Lab Results   Component Value Date    INR 1.84 (H) 01/16/2018    INR 2.44 (H) 01/15/2018    INR 2.32 (H) 01/14/2018    PROTIME 20.6 (H) 01/16/2018    PROTIME 25.7 (H) 01/15/2018    PROTIME 24.7 (H) 01/14/2018       Arthrocentesis R Knee:  8000 WBCs (80% PMNs)  3500 RBCs  No crystals    Microbiology:  1/14 BCx: NGTD  1/15 Synovial Fluid R Knee: NGTD    Radiology (personally reviewed images/report):  XR Knee moderate joint effusion    Assessment/Plan   1. 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 Paroxysmal Atrial fibrillation  -on coumadin  -cardiology following    Thank you for this consult. ID will follow.

## 2018-01-16 NOTE — PROGRESS NOTES
"CC: CAD/PAF    Interval History:   No complaints    Vital Signs  Temp:  [97.3 °F (36.3 °C)-98.5 °F (36.9 °C)] 98.5 °F (36.9 °C)  Heart Rate:  [78-85] 85  Resp:  [16] 16  BP: ()/(59-76) 116/76    Intake/Output Summary (Last 24 hours) at 01/16/18 0713  Last data filed at 01/16/18 0632   Gross per 24 hour   Intake              970 ml   Output              650 ml   Net              320 ml     Flowsheet Rows         First Filed Value    Admission Height  162.6 cm (64\") Documented at 01/14/2018 1252    Admission Weight  90.7 kg (200 lb) Documented at 01/14/2018 1252          PHYSICAL EXAM:  General: No acute distress  Resp:NL Rate, unlabored, clear  CV:NL rate and rhythm, NL PMI, Nl S1 and S2, 2/6 systolic Mumur, no gallop, no rub, No JVD. Normal pedal pulses  ABD:Nl sounds, no masses or tenderness, nondistended, no quarding or rebound  Neuro: alert,cooperative and oriented  Extr: No edema or cyanosis, moves all extremities      Results Review:      Results from last 7 days  Lab Units 01/16/18  0345   SODIUM mmol/L 143   POTASSIUM mmol/L 3.8   CHLORIDE mmol/L 104   CO2 mmol/L 25.5   BUN mg/dL 25*   CREATININE mg/dL 1.11*   GLUCOSE mg/dL 139*   CALCIUM mg/dL 8.2*           Results from last 7 days  Lab Units 01/16/18  0345   WBC 10*3/mm3 6.33   HEMOGLOBIN g/dL 10.4*   HEMATOCRIT % 33.2*   PLATELETS 10*3/mm3 170       Results from last 7 days  Lab Units 01/16/18  0345 01/15/18  0423 01/14/18  1338   INR  1.84* 2.44* 2.32*                 @LABRCNT(bnp)@  I reviewed the patient's new clinical results.  I personally viewed and interpreted the patient's EKG/Telemetry data        Medication Review:   Meds reviewed      Pharmacy to dose vancomycin    Pharmacy to Dose Zosyn        Assessment/Plan   1.  Possible septic right knee. Knee was drained yesterday results pending.  We are holding warfarin her INR is 1.8 would like to have her on heparin bridge her back to warfarin await orthopedics.  2.  History of coronary artery " disease with previous occlusion of the mid left anterior descending stenting of the first diagonal with a drug-eluting stent catheterization October 2012 restenosed the diagonal vessel with repeat drug-eluting stent placement of that left ventricular ejection fraction 50%.  Echocardiogram showed left ventricular ejection fraction of 23%.  3.  Ischemic cardio myopathy as above.  4.  Paroxysmal atrial fibrillation/sick sinus and now status post permanent pacemaker implantation status post atrial flutter ablation status post RV lead revision chads 2 vascular score 6 high risk for embolic event as above.  5.  Chronic dyspnea 6 underlying heart and lung disease as well as maybe a paralyzed left hemidiaphragm.  6.  History of diabetes mellitus.  7.  History of hypertension follow blood pressure  8.  History of hyperlipidemia intolerant of multiple statins.  9.  History of obstructive sleep apnea and pulmonary hypertension.   10.  Recent cerebral vascular event.  May have come from ulcerated plaque in the left common carotid artery ever could've been embolic from her heart.  As outlined above would also like to resume antiplatelets.      Rommel Veliz MD  01/16/18  7:13 AM

## 2018-01-16 NOTE — PROGRESS NOTES
Orthopedic Progress Note      Patient: Caitlyn Longoria  Date of Admission: 1/14/2018  YOB: 1934  Medical Record Number: [id number]      Systemic or Specific Complaints: Right knee pain possible infection    Pain Relief: complete resolution, she reports doing much better with ambulation    Physical Exam:  83 y.o.  female  Vitals:  Temp:  [97.4 °F (36.3 °C)-98.5 °F (36.9 °C)] 98 °F (36.7 °C)  Heart Rate:  [78-85] 80  Resp:  [16] 16  BP: ()/(59-81) 131/81  alert and oriented  Chest: Clear to auscultation  CV: Regular Rate and Rhythm  Abd: Soft, NT, with BS +  Ext: NV intact. ROM appropriate.  0-120 without discomfort.  There is no evidence of reaccumulation of the effusion Calf is soft and nontender. Negative Homans Sn  Skin: Incision clean dry and intact w/out signs or  symptoms of infection.    Activity: Mobilizing Per P.T.   Weight Bearing: As Tolerated    Data Review     Admission on 01/14/2018   Component Date Value Ref Range Status   • Glucose 01/14/2018 122* 65 - 99 mg/dL Final   • BUN 01/14/2018 26* 8 - 23 mg/dL Final   • Creatinine 01/14/2018 1.02* 0.57 - 1.00 mg/dL Final   • Sodium 01/14/2018 140  136 - 145 mmol/L Final   • Potassium 01/14/2018 3.9  3.5 - 5.2 mmol/L Final   • Chloride 01/14/2018 99  98 - 107 mmol/L Final   • CO2 01/14/2018 27.1  22.0 - 29.0 mmol/L Final   • Calcium 01/14/2018 8.8  8.6 - 10.5 mg/dL Final   • Total Protein 01/14/2018 6.6  6.0 - 8.5 g/dL Final   • Albumin 01/14/2018 3.70  3.50 - 5.20 g/dL Final   • ALT (SGPT) 01/14/2018 18  1 - 33 U/L Final   • AST (SGOT) 01/14/2018 20  1 - 32 U/L Final   • Alkaline Phosphatase 01/14/2018 89  39 - 117 U/L Final   • Total Bilirubin 01/14/2018 0.9  0.1 - 1.2 mg/dL Final   • eGFR Non African Amer 01/14/2018 52* >60 mL/min/1.73 Final   • Globulin 01/14/2018 2.9  gm/dL Final   • A/G Ratio 01/14/2018 1.3  g/dL Final   • BUN/Creatinine Ratio 01/14/2018 25.5* 7.0 - 25.0 Final   • Anion Gap 01/14/2018 13.9  mmol/L Final   •  Protime 01/14/2018 24.7* 11.7 - 14.2 Seconds Final   • INR 01/14/2018 2.32* 0.90 - 1.10 Final   • Blood Culture 01/14/2018 No growth at 24 hours   Preliminary   • Blood Culture 01/14/2018 No growth at 24 hours   Preliminary   • Lactate 01/14/2018 1.5  0.5 - 2.0 mmol/L Final   • Sed Rate 01/14/2018 34* 0 - 30 mm/hr Final   • C-Reactive Protein 01/14/2018 1.99* 0.00 - 0.50 mg/dL Final   • WBC 01/14/2018 7.35  4.50 - 10.70 10*3/mm3 Final   • RBC 01/14/2018 3.75* 3.90 - 5.20 10*6/mm3 Final   • Hemoglobin 01/14/2018 12.1  11.9 - 15.5 g/dL Final   • Hematocrit 01/14/2018 37.4  35.6 - 45.5 % Final   • MCV 01/14/2018 99.7* 80.5 - 98.2 fL Final   • MCH 01/14/2018 32.3* 26.9 - 32.0 pg Final   • MCHC 01/14/2018 32.4  32.4 - 36.3 g/dL Final   • RDW 01/14/2018 15.4* 11.7 - 13.0 % Final   • RDW-SD 01/14/2018 55.9* 37.0 - 54.0 fl Final   • MPV 01/14/2018 10.4  6.0 - 12.0 fL Final   • Platelets 01/14/2018 185  140 - 500 10*3/mm3 Final   • Neutrophil % 01/14/2018 44.7  42.7 - 76.0 % Final   • Lymphocyte % 01/14/2018 50.9* 19.6 - 45.3 % Final   • Monocyte % 01/14/2018 2.6* 5.0 - 12.0 % Final   • Eosinophil % 01/14/2018 1.1  0.3 - 6.2 % Final   • Basophil % 01/14/2018 0.3  0.0 - 1.5 % Final   • Immature Grans % 01/14/2018 0.4  0.0 - 0.5 % Final   • Neutrophils, Absolute 01/14/2018 3.29  1.90 - 8.10 10*3/mm3 Final   • Lymphocytes, Absolute 01/14/2018 3.74  0.90 - 4.80 10*3/mm3 Final   • Monocytes, Absolute 01/14/2018 0.19* 0.20 - 1.20 10*3/mm3 Final   • Eosinophils, Absolute 01/14/2018 0.08  0.00 - 0.70 10*3/mm3 Final   • Basophils, Absolute 01/14/2018 0.02  0.00 - 0.20 10*3/mm3 Final   • Immature Grans, Absolute 01/14/2018 0.03  0.00 - 0.03 10*3/mm3 Final   • Glucose 01/14/2018 105  70 - 130 mg/dL Final   • Glucose 01/15/2018 122* 65 - 99 mg/dL Final   • BUN 01/15/2018 23  8 - 23 mg/dL Final   • Creatinine 01/15/2018 1.03* 0.57 - 1.00 mg/dL Final   • Sodium 01/15/2018 146* 136 - 145 mmol/L Final   • Potassium 01/15/2018 3.7  3.5 - 5.2  mmol/L Final   • Chloride 01/15/2018 104  98 - 107 mmol/L Final   • CO2 01/15/2018 29.1* 22.0 - 29.0 mmol/L Final   • Calcium 01/15/2018 8.2* 8.6 - 10.5 mg/dL Final   • eGFR Non African Amer 01/15/2018 51* >60 mL/min/1.73 Final   • BUN/Creatinine Ratio 01/15/2018 22.3  7.0 - 25.0 Final   • Anion Gap 01/15/2018 12.9  mmol/L Final   • Protime 01/15/2018 25.7* 11.7 - 14.2 Seconds Final   • INR 01/15/2018 2.44* 0.90 - 1.10 Final   • WBC 01/15/2018 6.17  4.50 - 10.70 10*3/mm3 Final   • RBC 01/15/2018 3.44* 3.90 - 5.20 10*6/mm3 Final   • Hemoglobin 01/15/2018 10.9* 11.9 - 15.5 g/dL Final   • Hematocrit 01/15/2018 34.9* 35.6 - 45.5 % Final   • MCV 01/15/2018 101.5* 80.5 - 98.2 fL Final   • MCH 01/15/2018 31.7  26.9 - 32.0 pg Final   • MCHC 01/15/2018 31.2* 32.4 - 36.3 g/dL Final   • RDW 01/15/2018 15.4* 11.7 - 13.0 % Final   • RDW-SD 01/15/2018 56.9* 37.0 - 54.0 fl Final   • MPV 01/15/2018 9.9  6.0 - 12.0 fL Final   • Platelets 01/15/2018 162  140 - 500 10*3/mm3 Final   • Neutrophil % 01/15/2018 37.4* 42.7 - 76.0 % Final   • Lymphocyte % 01/15/2018 56.9* 19.6 - 45.3 % Final   • Monocyte % 01/15/2018 3.9* 5.0 - 12.0 % Final   • Eosinophil % 01/15/2018 1.3  0.3 - 6.2 % Final   • Basophil % 01/15/2018 0.2  0.0 - 1.5 % Final   • Immature Grans % 01/15/2018 0.3  0.0 - 0.5 % Final   • Neutrophils, Absolute 01/15/2018 2.31  1.90 - 8.10 10*3/mm3 Final   • Lymphocytes, Absolute 01/15/2018 3.51  0.90 - 4.80 10*3/mm3 Final   • Monocytes, Absolute 01/15/2018 0.24  0.20 - 1.20 10*3/mm3 Final   • Eosinophils, Absolute 01/15/2018 0.08  0.00 - 0.70 10*3/mm3 Final   • Basophils, Absolute 01/15/2018 0.01  0.00 - 0.20 10*3/mm3 Final   • Immature Grans, Absolute 01/15/2018 0.02  0.00 - 0.03 10*3/mm3 Final   • Ovalocytes 01/15/2018 Slight/1+  None Seen Final   • WBC Morphology 01/15/2018 Normal  Normal Final   • Platelet Morphology 01/15/2018 Normal  Normal Final   • Glucose 01/15/2018 114  70 - 130 mg/dL Final   • BF Culture 01/15/2018 No  growth at 24 hours   Preliminary   • Gram Stain Result 01/15/2018 No organisms seen   Preliminary   • Gram Stain Result 01/15/2018 Rare (1+) WBCs per low power field   Preliminary   • Color, Fluid 01/15/2018 Yellow   Final   • Appearance, Fluid 01/15/2018 Cloudy* Clear Final   • WBC, Fluid 01/15/2018 8200  /mm3 Final   • RBC, Fluid 01/15/2018 3500  /mm3 Final   • Glucose 01/15/2018 140* 70 - 130 mg/dL Final   • Crystals, Fluid 01/15/2018 None Seen   Final   • Neutrophils, Fluid 01/15/2018 80  % Final   • Lymphocytes, Fluid 01/15/2018 17  % Final   • Monocytes, Fluid 01/15/2018 3  % Final   • Glucose 01/15/2018 163* 70 - 130 mg/dL Final   • ASA Platelet Inhibition 01/15/2018 523  ARU Final   • Glucose 01/15/2018 158* 70 - 130 mg/dL Final   • Protime 01/16/2018 20.6* 11.7 - 14.2 Seconds Final   • INR 01/16/2018 1.84* 0.90 - 1.10 Final   • Glucose 01/16/2018 139* 65 - 99 mg/dL Final   • BUN 01/16/2018 25* 8 - 23 mg/dL Final   • Creatinine 01/16/2018 1.11* 0.57 - 1.00 mg/dL Final   • Sodium 01/16/2018 143  136 - 145 mmol/L Final   • Potassium 01/16/2018 3.8  3.5 - 5.2 mmol/L Final   • Chloride 01/16/2018 104  98 - 107 mmol/L Final   • CO2 01/16/2018 25.5  22.0 - 29.0 mmol/L Final   • Calcium 01/16/2018 8.2* 8.6 - 10.5 mg/dL Final   • eGFR Non African Amer 01/16/2018 47* >60 mL/min/1.73 Final   • BUN/Creatinine Ratio 01/16/2018 22.5  7.0 - 25.0 Final   • Anion Gap 01/16/2018 13.5  mmol/L Final   • WBC 01/16/2018 6.33  4.50 - 10.70 10*3/mm3 Final   • RBC 01/16/2018 3.24* 3.90 - 5.20 10*6/mm3 Final   • Hemoglobin 01/16/2018 10.4* 11.9 - 15.5 g/dL Final   • Hematocrit 01/16/2018 33.2* 35.6 - 45.5 % Final   • MCV 01/16/2018 102.5* 80.5 - 98.2 fL Final   • MCH 01/16/2018 32.1* 26.9 - 32.0 pg Final   • MCHC 01/16/2018 31.3* 32.4 - 36.3 g/dL Final   • RDW 01/16/2018 15.4* 11.7 - 13.0 % Final   • RDW-SD 01/16/2018 57.8* 37.0 - 54.0 fl Final   • MPV 01/16/2018 9.9  6.0 - 12.0 fL Final   • Platelets 01/16/2018 170  140 - 500  10*3/mm3 Final   • Glucose 01/16/2018 131* 70 - 130 mg/dL Final       Xr Knee 1 Or 2 View Right    Result Date: 1/14/2018  Narrative: RIGHT KNEE 2 VIEWS  HISTORY: Patient is an 83-year-old female status post right knee arthroplasty 06/20/2014 presenting for evaluation of pain and swelling right knee laterally x5 days.  COMPARISON: None available  FINDINGS: 1. There is moderate joint effusion. 2. Right knee arthroplasty is intact. 3. Diffuse soft tissue calcification suggests previous inflammatory change possible previous hemorrhage. 4. Prominent spurring at the superior and inferior aspect patella, no acute osseous abnormality.  This report was finalized on 1/14/2018 1:57 PM by Dr. Negrito Lewis MD.      Fl Video Swallow With Speech    Result Date: 12/21/2017  Narrative: VIDEO SWALLOW STUDY  HISTORY: Dysphagia.  FINDINGS:  3 minutes 33 seconds fluoroscopy was provided for the speech pathologist during a video swallow study.  Transient penetration was observed with thin liquids.      Impression: Fluoroscopy was provided for the speech pathologist during a video swallow study. For full details please see the speech pathology report.  This report was finalized on 12/21/2017 4:16 PM by Dr. Jonah Kilgore MD.      Xr Chest Pa & Lateral    Result Date: 1/14/2018  Narrative: ONE VIEW PORTABLE CHEST AT 5:28 PM  HISTORY: Cough.  FINDINGS: There is prominent elevation of the left hemidiaphragm unchanged from previous studies including the most recent previous exam dated 12/07/2017. The heart remains enlarged with a pacemaker in place. The lungs are clear except for some minimal vague chronic atelectasis or scarring in the left perihilar region adjacent to the elevated hemidiaphragm.  This report was finalized on 1/14/2018 6:57 PM by Dr. Guille Trotter MD.        Medications:    amitriptyline 25 mg Oral Nightly   carvedilol 3.125 mg Oral Q12H   famotidine 40 mg Oral Nightly   fluticasone 2 spray Each Nare Daily   insulin  aspart 0-7 Units Subcutaneous 4x Daily With Meals & Nightly   loperamide 2 mg Oral Daily   oxazepam 10 mg Oral Nightly   oxybutynin XL 10 mg Oral Daily   pantoprazole 40 mg Oral BID With Meals   piperacillin-tazobactam 3.375 g Intravenous Q8H   rosuvastatin 20 mg Oral Daily   saccharomyces boulardii 250 mg Oral BID   vancomycin 1,500 mg Intravenous Q24H     •  acetaminophen  •  dextrose  •  dextrose  •  glucagon (human recombinant)  •  Morphine **AND** naloxone  •  ondansetron **OR** ondansetron ODT **OR** ondansetron  •  Pharmacy to dose vancomycin  •  Pharmacy to Dose Zosyn    Assessment:  Doing well Pain and ambulation are improving nicely  Knee aspiration results as above are equivocal and do not show obvious infection  Problem List Items Addressed This Visit        Other    * (Principal)Infection of right knee - Primary    Relevant Medications    doxycycline (VIBRAMYICN) 100 MG tablet      Other Visit Diagnoses     Knee effusion, right              Plan:  Continue efforts to mobilize  Continue Pain Control Measures  Continue incisional Care  DVT prophylaxis- we will restart her Coumadin which is needed for cardiac purposes as well.  Right knee pain improving.  Results of aspiration were equivocal.  At this point this appears somewhat unusual for a prosthetic joint infection although given her history certainly a possibility.  Given the fact that she is a poor surgical candidate combined with the equivocal results, and her previous history, my opinion is that this would be a case best managed with antibiotic suppression.  I will obtain an infectious disease consult and we'll follow along.    Discharge Plan:Home versus rehabilitation.  We'll follow her over the next day or 2    Alfredo Hunter MD    Date: 1/16/2018  Time: 7:42 AM

## 2018-01-16 NOTE — DISCHARGE PLACEMENT REQUEST
"Caitlyn Longoria (83 y.o. Female)     Date of Birth Social Security Number Address Home Phone MRN    1934  [address] [address] [phone] [id number]    Yazdanism Marital Status          Confucianist Single       Admission Date Admission Type Admitting Provider Attending Provider Department, Room/Bed    1/14/18 Emergency Jv Joseph MD Ray, Jonathan, MD [address], P887/1    Discharge Date Discharge Disposition Discharge Destination                      Attending Provider: Shamir Wilkins MD     Allergies:  Ahist [Chlorpheniramine], Clarithromycin, Diclofenac, Diphenhydramine, Esomeprazole, Ibuprofen, Levalbuterol, Levocetirizine, Lipitor [Atorvastatin], Lodine [Etodolac], Omeprazole, Pravachol [Pravastatin], Quinapril, Sulfa Antibiotics, Sulindac, Valdecoxib, Prednisone    Isolation:  None   Infection:  None   Code Status:  FULL    Ht:  162.6 cm (64\")   Wt:  90.7 kg (200 lb)    Admission Cmt:  None   Principal Problem:  Infection of right knee [M00.9]                 Active Insurance as of 1/14/2018     Primary Coverage     Payor Plan Insurance Group Employer/Plan Group    MEDICARE MEDICARE A & B      Payor Plan Address Payor Plan Phone Number Effective From Effective To    [address] [phone] 10/1/1999     [place] [address]       Subscriber Name Subscriber Birth Date Member ID       CAITLYN LONGORIA 1934 [id number]           Secondary Coverage     Payor Plan Insurance Group Employer/Plan Group    AARP MED SUPP AAR HEALTH CARE OPTIONS PLAN F     Payor Plan Address Payor Plan Phone Number Effective From Effective To    [facility] [phone] 1/1/2015     [address]       [place] [address]       Subscriber Name Subscriber Birth Date Member ID       CAITLYN LONGORIA 1934 [id number]                 Emergency Contacts      (Rel.) Home Phone Work Phone Mobile Phone    Trae Longoria (Brother) [phone] " -- [phone]    Zoey Adair (Relative) [phone] -- --    Inez Longoria (Sister) [phone] -- --

## 2018-01-16 NOTE — PROGRESS NOTES
Continued Stay Note  [facility]     Patient Name: Caitlyn Longoria  MRN: [id number]  Today's Date: 1/16/2018    Admit Date: 1/14/2018          Discharge Plan       01/16/18 [employer]    Case Management/Social Work Plan    Plan referral pending w/ [facility]     Additional Comments F/u w/ pt at the bedside, she would now like a referral sent to Carol/[facility]. Zoey/Susy notified.               Discharge Codes     None            Marcy Lopez RN

## 2018-01-16 NOTE — PLAN OF CARE
Problem: Patient Care Overview (Adult)  Goal: Plan of Care Review  Outcome: Ongoing (interventions implemented as appropriate)   01/16/18 1612   Patient Care Overview   Progress improving   Coping/Psychosocial Response Interventions   Plan Of Care Reviewed With patient   Outcome Evaluation   Outcome Summary/Follow up Plan VSS, 'S, EDEMA AND ERYTHEMA RIGHT KNEE, NO C/O PAIN NOTED, RESTARTED COUMADIN, VANC AND ZOSYN DCED- STARTED ON KEFZOL, NVI, AMBULATES C WALKER, VOIDING PER BRP     Goal: Adult Individualization and Mutuality  Outcome: Ongoing (interventions implemented as appropriate)    Goal: Discharge Needs Assessment  Outcome: Ongoing (interventions implemented as appropriate)      Problem: Mobility, Physical Impaired (Adult)  Goal: Enhanced Mobility Skills  Outcome: Ongoing (interventions implemented as appropriate)      Problem: Fall Risk (Adult)  Goal: Absence of Falls  Outcome: Ongoing (interventions implemented as appropriate)

## 2018-01-16 NOTE — PROGRESS NOTES
Name: Caitlyn Longoria ADMIT: 2018   : 1934  PCP: Zenaida Suarez MD    MRN: [id number] LOS: 2 days   AGE/SEX: 83 y.o. female  ROOM: [facility]     Subjective   Subjective:  Symptoms:  No shortness of breath or chest pain.    Diet:  Adequate intake.  No nausea.    Pain:  She reports no pain.    Knee pain better. Denies any other complaints. Able to ambulate.    Objective   Vital Signs  Temp:  [97.3 °F (36.3 °C)-98.5 °F (36.9 °C)] 98.5 °F (36.9 °C)  Heart Rate:  [78-85] 85  Resp:  [16] 16  BP: ()/(59-76) 116/76  SpO2:  [93 %-96 %] 93 %  on   ;   O2 Device: room air  Body mass index is 34.33 kg/(m^2).    Physical Exam   Constitutional: She is oriented to person, place, and time. She appears well-developed. She is cooperative. No distress.   Neck: No JVD present. No tracheal deviation present. No thyromegaly present.   Cardiovascular: Normal rate and regular rhythm.    Murmur heard.  Pulmonary/Chest: Effort normal and breath sounds normal. No respiratory distress.   Abdominal: Soft. Normal appearance and bowel sounds are normal. She exhibits no distension. There is no tenderness.   Musculoskeletal: She exhibits edema.   The right knee is mildly warm to the touch.  She has edema surrounding the joint and pain with range of motion.    Neurological: She is alert and oriented to person, place, and time.   Skin: Skin is warm and dry. No rash noted.   Psychiatric: She has a normal mood and affect. Her behavior is normal.   Nursing note and vitals reviewed.      Results Review:       I reviewed the patient's new clinical results.    Results from last 7 days  Lab Units 18  0345 01/15/18  0423 18  1338   WBC 10*3/mm3 6.33 6.17 7.35   HEMOGLOBIN g/dL 10.4* 10.9* 12.1   PLATELETS 10*3/mm3 170 162 185       Results from last 7 days  Lab Units 18  0345 01/15/18  0423 18  1338   SODIUM mmol/L 143 146* 140   POTASSIUM mmol/L 3.8 3.7 3.9   CHLORIDE mmol/L 104 104 99   CO2 mmol/L  25.5 29.1* 27.1   BUN mg/dL 25* 23 26*   CREATININE mg/dL 1.11* 1.03* 1.02*   GLUCOSE mg/dL 139* 122* 122*   ALBUMIN g/dL  --   --  3.70   BILIRUBIN mg/dL  --   --  0.9   ALK PHOS U/L  --   --  89   AST (SGOT) U/L  --   --  20   ALT (SGPT) U/L  --   --  18   Estimated Creatinine Clearance: 41.9 mL/min (by C-G formula based on Cr of 1.11).    Results from last 7 days  Lab Units 01/16/18  0345 01/15/18  0423 01/14/18  1338   CALCIUM mg/dL 8.2* 8.2* 8.8   ALBUMIN g/dL  --   --  3.70         Results from last 7 days  Lab Units 01/14/18  1338   LACTATE mmol/L 1.5   SED RATE mm/hr 34*   CRP mg/dL 1.99*       Results from last 7 days  Lab Units 01/14/18  1411 01/14/18  1338   BLOODCX  No growth at 24 hours No growth at 24 hours       Results from last 7 days  Lab Units 01/16/18  0345 01/15/18  0423 01/14/18  1338   PROTIME Seconds 20.6* 25.7* 24.7*   INR  1.84* 2.44* 2.32*     Glucose   Date/Time Value Ref Range Status   01/15/2018 2105 158 (H) 70 - 130 mg/dL Final   01/15/2018 1639 163 (H) 70 - 130 mg/dL Final   01/15/2018 1048 140 (H) 70 - 130 mg/dL Final   01/15/2018 0623 114 70 - 130 mg/dL Final   01/14/2018 2035 105 70 - 130 mg/dL Final         amitriptyline 25 mg Oral Nightly   carvedilol 3.125 mg Oral Q12H   famotidine 40 mg Oral Nightly   fluticasone 2 spray Each Nare Daily   insulin aspart 0-7 Units Subcutaneous 4x Daily With Meals & Nightly   loperamide 2 mg Oral Daily   oxazepam 10 mg Oral Nightly   oxybutynin XL 10 mg Oral Daily   pantoprazole 40 mg Oral BID With Meals   piperacillin-tazobactam 3.375 g Intravenous Q8H   rosuvastatin 20 mg Oral Daily   saccharomyces boulardii 250 mg Oral BID   vancomycin 1,500 mg Intravenous Q24H       Pharmacy to dose vancomycin    Pharmacy to Dose Zosyn    Diet Regular; Consistent Carbohydrate      Assessment/Plan   Assessment:     Active Hospital Problems (** Indicates Principal Problem)    Diagnosis Date Noted   • **Infection of right knee [M00.9] 01/14/2018   • Chronic  combined systolic and diastolic congestive heart failure [I50.42] 01/15/2018   • HTN (hypertension) [I10] 01/14/2018   • CKD (chronic kidney disease) stage 3, GFR 30-59 ml/min [N18.3] 01/14/2018   • DM type 2 (diabetes mellitus, type 2) [E11.9] 12/07/2017   • Atrial fibrillation [I48.91] 12/07/2017   • CAD (coronary artery disease), hx of stent [I25.10] 12/07/2017      Resolved Hospital Problems    Diagnosis Date Noted Date Resolved   • Hypernatremia [E87.0] 01/15/2018 01/16/2018       · Continue to hold losartan  · Follow up fluid studies. Empiric antibiotics ordered.  · Blood sugars stable.  · Dr. Hunter to evaluate and make further recommendations.      Shamir Wilkins MD  [facility]  01/16/18  6:18 AM

## 2018-01-16 NOTE — PLAN OF CARE
Problem: Patient Care Overview (Adult)  Goal: Plan of Care Review  Outcome: Ongoing (interventions implemented as appropriate)   01/16/18 0359   Patient Care Overview   Progress improving   Coping/Psychosocial Response Interventions   Plan Of Care Reviewed With patient   Outcome Evaluation   Outcome Summary/Follow up Plan VSS. Decreased edema and erythema to right knee. No longer hot to touch. PT has denied pain. Await aspiration culture results. Anticoagulant remains on hold with PT INR results being monitored daily. No s/sx acute CVA. supplemental insulin given based on fingerstick results. PT understands all educational topics to include monitoring for s/sx CVA, monitoring blood glucose and medication complaince for diabetes and coronary conditions.       Problem: Mobility, Physical Impaired (Adult)  Goal: Identify Related Risk Factors and Signs and Symptoms  Outcome: Outcome(s) achieved Date Met: 01/16/18 01/16/18 0359   Mobility, Physical Impaired   Physical Mobility, Impaired: Related Risk Factors functional decline   Signs and Symptoms (Physical Mobility Impaired) decreased balance     Goal: Enhanced Mobility Skills  Outcome: Ongoing (interventions implemented as appropriate)    Goal: Enhanced Functionality Ability  Outcome: Ongoing (interventions implemented as appropriate)   01/16/18 0359   Mobility, Physical Impaired (Adult)   Enhanced Functionality Ability making progress toward outcome       Problem: Infection, Risk/Actual (Adult)  Goal: Infection Prevention/Resolution  Outcome: Ongoing (interventions implemented as appropriate)   01/16/18 0359   Infection, Risk/Actual (Adult)   Infection Prevention/Resolution making progress toward outcome       Problem: Fall Risk (Adult)  Goal: Absence of Falls  Outcome: Ongoing (interventions implemented as appropriate)   01/16/18 0359   Fall Risk (Adult)   Absence of Falls achieves outcome

## 2018-01-17 PROBLEM — T84.53XA INFECTION OF PROSTHETIC RIGHT KNEE JOINT (HCC): Status: ACTIVE | Noted: 2018-01-17

## 2018-01-17 LAB
CREAT BLD-MCNC: 1.17 MG/DL (ref 0.57–1)
DEPRECATED RDW RBC AUTO: 57.5 FL (ref 37–54)
ERYTHROCYTE [DISTWIDTH] IN BLOOD BY AUTOMATED COUNT: 15.2 % (ref 11.7–13)
GFR SERPL CREATININE-BSD FRML MDRD: 44 ML/MIN/1.73
GLUCOSE BLDC GLUCOMTR-MCNC: 123 MG/DL (ref 70–130)
GLUCOSE BLDC GLUCOMTR-MCNC: 125 MG/DL (ref 70–130)
GLUCOSE BLDC GLUCOMTR-MCNC: 149 MG/DL (ref 70–130)
GLUCOSE BLDC GLUCOMTR-MCNC: 214 MG/DL (ref 70–130)
HCT VFR BLD AUTO: 36.9 % (ref 35.6–45.5)
HGB BLD-MCNC: 11.4 G/DL (ref 11.9–15.5)
INR PPP: 1.53 (ref 0.9–1.1)
MCH RBC QN AUTO: 31.8 PG (ref 26.9–32)
MCHC RBC AUTO-ENTMCNC: 30.9 G/DL (ref 32.4–36.3)
MCV RBC AUTO: 103.1 FL (ref 80.5–98.2)
PLATELET # BLD AUTO: 181 10*3/MM3 (ref 140–500)
PMV BLD AUTO: 10.2 FL (ref 6–12)
PROTHROMBIN TIME: 17.9 SECONDS (ref 11.7–14.2)
RBC # BLD AUTO: 3.58 10*6/MM3 (ref 3.9–5.2)
WBC NRBC COR # BLD: 7.64 10*3/MM3 (ref 4.5–10.7)

## 2018-01-17 PROCEDURE — 63710000001 INSULIN ASPART PER 5 UNITS: Performed by: INTERNAL MEDICINE

## 2018-01-17 PROCEDURE — 25010000002 ENOXAPARIN PER 10 MG: Performed by: INTERNAL MEDICINE

## 2018-01-17 PROCEDURE — 82962 GLUCOSE BLOOD TEST: CPT

## 2018-01-17 PROCEDURE — 94799 UNLISTED PULMONARY SVC/PX: CPT

## 2018-01-17 PROCEDURE — 25010000002 CEFAZOLIN PER 500 MG: Performed by: INTERNAL MEDICINE

## 2018-01-17 PROCEDURE — 97161 PT EVAL LOW COMPLEX 20 MIN: CPT | Performed by: PHYSICAL THERAPIST

## 2018-01-17 PROCEDURE — 85027 COMPLETE CBC AUTOMATED: CPT | Performed by: INTERNAL MEDICINE

## 2018-01-17 PROCEDURE — 99232 SBSQ HOSP IP/OBS MODERATE 35: CPT | Performed by: INTERNAL MEDICINE

## 2018-01-17 PROCEDURE — 82565 ASSAY OF CREATININE: CPT | Performed by: INTERNAL MEDICINE

## 2018-01-17 PROCEDURE — 85610 PROTHROMBIN TIME: CPT | Performed by: INTERNAL MEDICINE

## 2018-01-17 RX ORDER — WARFARIN SODIUM 7.5 MG/1
7.5 TABLET ORAL
Status: COMPLETED | OUTPATIENT
Start: 2018-01-17 | End: 2018-01-17

## 2018-01-17 RX ADMIN — INSULIN ASPART 3 UNITS: 100 INJECTION, SOLUTION INTRAVENOUS; SUBCUTANEOUS at 11:56

## 2018-01-17 RX ADMIN — LOPERAMIDE HYDROCHLORIDE 2 MG: 2 CAPSULE ORAL at 09:00

## 2018-01-17 RX ADMIN — WARFARIN SODIUM 7.5 MG: 7.5 TABLET ORAL at 17:22

## 2018-01-17 RX ADMIN — ROSUVASTATIN CALCIUM 20 MG: 20 TABLET, FILM COATED ORAL at 09:00

## 2018-01-17 RX ADMIN — OXAZEPAM 10 MG: 10 CAPSULE, GELATIN COATED ORAL at 21:59

## 2018-01-17 RX ADMIN — FLUTICASONE PROPIONATE 2 SPRAY: 50 SPRAY, METERED NASAL at 09:03

## 2018-01-17 RX ADMIN — CARVEDILOL 3.12 MG: 3.12 TABLET, FILM COATED ORAL at 09:00

## 2018-01-17 RX ADMIN — Medication 250 MG: at 21:59

## 2018-01-17 RX ADMIN — FAMOTIDINE 40 MG: 40 TABLET, FILM COATED ORAL at 21:59

## 2018-01-17 RX ADMIN — ENOXAPARIN SODIUM 90 MG: 100 INJECTION SUBCUTANEOUS at 07:34

## 2018-01-17 RX ADMIN — CEFAZOLIN 2 G: 10 INJECTION, POWDER, FOR SOLUTION INTRAVENOUS at 04:00

## 2018-01-17 RX ADMIN — AMITRIPTYLINE HYDROCHLORIDE 25 MG: 25 TABLET, FILM COATED ORAL at 21:59

## 2018-01-17 RX ADMIN — CEFAZOLIN 2 G: 10 INJECTION, POWDER, FOR SOLUTION INTRAVENOUS at 20:30

## 2018-01-17 RX ADMIN — Medication 250 MG: at 09:00

## 2018-01-17 RX ADMIN — CARVEDILOL 3.12 MG: 3.12 TABLET, FILM COATED ORAL at 21:59

## 2018-01-17 RX ADMIN — PANTOPRAZOLE SODIUM 40 MG: 40 TABLET, DELAYED RELEASE ORAL at 17:22

## 2018-01-17 RX ADMIN — OXYBUTYNIN CHLORIDE 10 MG: 5 TABLET, EXTENDED RELEASE ORAL at 21:59

## 2018-01-17 RX ADMIN — CEFAZOLIN 2 G: 10 INJECTION, POWDER, FOR SOLUTION INTRAVENOUS at 11:32

## 2018-01-17 RX ADMIN — PANTOPRAZOLE SODIUM 40 MG: 40 TABLET, DELAYED RELEASE ORAL at 07:33

## 2018-01-17 NOTE — DISCHARGE PLACEMENT REQUEST
"Khurram Longoriabie GARRETT (83 y.o. Female)     Date of Birth Social Security Number Address Home Phone MRN    1934  [address] [address] [phone] [id number]    Evangelical Marital Status          Nondenominational Single       Admission Date Admission Type Admitting Provider Attending Provider Department, Room/Bed    1/14/18 Emergency Jv Joseph MD Ray, Jonathan, MD [address], P887/1    Discharge Date Discharge Disposition Discharge Destination                      Attending Provider: Shamir Wilkins MD     Allergies:  Ahist [Chlorpheniramine], Clarithromycin, Diclofenac, Diphenhydramine, Esomeprazole, Ibuprofen, Levalbuterol, Levocetirizine, Lipitor [Atorvastatin], Lodine [Etodolac], Omeprazole, Pravachol [Pravastatin], Quinapril, Sulfa Antibiotics, Sulindac, Valdecoxib, Prednisone    Isolation:  None   Infection:  None   Code Status:  FULL    Ht:  162.6 cm (64\")   Wt:  90.7 kg (200 lb)    Admission Cmt:  None   Principal Problem:  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 [phone] 1/1/2015     [address]       [place] [address]       Subscriber Name Subscriber Birth Date Member ID       CAITLYN LONGORIA 1934 [id number]                 Emergency Contacts      (Rel.) Home Phone Work Phone Mobile Phone    Trae Longoria " (Brother) [phone] -- [phone]    Zoey Adair (Relative) [phone] -- --    Inez Longoria (Sister) [phone] -- --

## 2018-01-17 NOTE — PROGRESS NOTES
Continued Stay Note  [facility]     Patient Name: Caitlyn Longoria  MRN: [id number]  Today's Date: 1/17/2018    Admit Date: 1/14/2018          Discharge Plan       01/17/18 1657    Case Management/Social Work Plan    Plan [facility]     Additional Comments F/u w/ pt at the bedside, after speaking w/ her family, the pt would now like to d/c to [facility]. She is planning to move to assisted living at [facility] in the near future. Latonya/Liz notified and will accept, bed available on Friday, possibly on Thur. Latonya will f/u in AM w/ definite bed availability.  [facility] will notify [facility] w/ plan.               Discharge Codes     None            Marcy Lopez RN

## 2018-01-17 NOTE — NURSING NOTE
Call placed to Dr. Miller per IV team (Robin) request.  They stated because of pt CKD they need nephro clearance to place a PICC line.  Per Dr Miller it is not necessary to consult nephro. Pt creatinine is 1.1 and she needs IV antibiotics to treat her knee infection. Notified Samantha of the IV team.

## 2018-01-17 NOTE — PROGRESS NOTES
Orthopedic Progress Note      Patient: Caitlyn Longoria    YOB: 1934    Medical Record Number: [id number]    Attending Physician: Shamir Wilkins MD    Date of Admission: 1/14/2018 12:53 PM    Admitting Dx:  Knee effusion, right [M25.461]  Infection of right knee [M00.9]    Current Problem List:   Patient Active Problem List   Diagnosis   • Neck and shoulder pain   • Arthropathy of shoulder region   • Carpal tunnel syndrome of left wrist   • Chronic pain of both shoulders   • Chronic left shoulder pain   • Arthropathy of left shoulder   • Dysarthria   • DM type 2 (diabetes mellitus, type 2)   • Atrial fibrillation   • HLD (hyperlipidemia)   • Bronchitis   • CAD (coronary artery disease), hx of stent   • CVA (cerebral vascular accident)   • HTN (hypertension)   • CKD (chronic kidney disease) stage 3, GFR 30-59 ml/min   • Chronic combined systolic and diastolic congestive heart failure   • Infection of prosthetic right knee joint         Past Medical History:   Diagnosis Date   • Abdominal pain    • Cough    • Diarrhea    • Difficulty breathing    • Dizziness    • DM type 2 (diabetes mellitus, type 2)        SUBJECTIVE: 83 y.o.  female. Awake and alert.  No complaints.     OBJECTIVE:   Vitals:    01/16/18 1949 01/16/18 2257 01/17/18 0359 01/17/18 0720   BP: 130/82 124/80 135/79 135/92   BP Location: Left arm Left arm Left arm Left arm   Patient Position: Sitting Lying Lying Sitting   Pulse: 92 79 84 85   Resp: 16 16 16 16   Temp: 97.9 °F (36.6 °C) 98.7 °F (37.1 °C) 97.6 °F (36.4 °C) 97.5 °F (36.4 °C)   TempSrc: Oral Oral Oral Oral   SpO2: 94% 97% 94% 90%   Weight:       Height:         I/O last 3 completed shifts:  In: 970 [P.O.:720; IV Piggyback:250]  Out: -     PHYSICAL EXAM:    Current Medications:  Scheduled Meds:  amitriptyline 25 mg Oral Nightly   carvedilol 3.125 mg Oral Q12H   ceFAZolin 2 g Intravenous Q8H   famotidine 40 mg Oral Nightly   fluticasone 2 spray Each Nare Daily   insulin aspart  0-7 Units Subcutaneous 4x Daily With Meals & Nightly   loperamide 2 mg Oral Daily   oxazepam 10 mg Oral Nightly   oxybutynin XL 10 mg Oral Daily   pantoprazole 40 mg Oral BID With Meals   rosuvastatin 20 mg Oral Daily   saccharomyces boulardii 250 mg Oral BID   warfarin 7.5 mg Oral Once     PRN Meds:.•  acetaminophen  •  dextrose  •  dextrose  •  glucagon (human recombinant)  •  Morphine **AND** naloxone  •  ondansetron **OR** ondansetron ODT **OR** ondansetron    Diagnostic Tests:   Lab Results (last 24 hours)     Procedure Component Value Units Date/Time    C-reactive Protein [[id number]]  (Abnormal) Collected:  01/16/18 0345    Specimen:  Blood Updated:  01/16/18 1053     C-Reactive Protein 1.38 (H) mg/dL     POC Glucose Once [[id number]]  (Abnormal) Collected:  01/16/18 1130    Specimen:  Blood Updated:  01/16/18 1142     Glucose 160 (H) mg/dL     Narrative:       Meter: [id number] : [id number] Edy VALENTIN    Blood Culture - Blood, [[id number]]  (Normal) Collected:  01/14/18 1338    Specimen:  Blood from Arm, Left Updated:  01/16/18 1401     Blood Culture No growth at 2 days    Blood Culture - Blood, [[id number]]  (Normal) Collected:  01/14/18 1411    Specimen:  Blood from Arm, Left Updated:  01/16/18 1501     Blood Culture No growth at 2 days    POC Glucose Once [[id number]]  (Normal) Collected:  01/16/18 1600    Specimen:  Blood Updated:  01/16/18 1603     Glucose 121 mg/dL     Narrative:       Meter: [id number] : [id number] Edy VALENTIN    POC Glucose Once [[id number]]  (Abnormal) Collected:  01/16/18 2105    Specimen:  Blood Updated:  01/16/18 2117     Glucose 157 (H) mg/dL     Narrative:       Meter: [id number] : [id number] Neto Jackson    CBC (No Diff) [[id number]]  (Abnormal) Collected:  01/17/18 0438    Specimen:  Blood Updated:  01/17/18 0523     WBC 7.64 10*3/mm3      RBC 3.58 (L) 10*6/mm3      Hemoglobin 11.4 (L) g/dL      Hematocrit 36.9 %      .1 (H) fL      MCH 31.8 pg       MCHC 30.9 (L) g/dL      RDW 15.2 (H) %      RDW-SD 57.5 (H) fl      MPV 10.2 fL      Platelets 181 10*3/mm3     Protime-INR [[id number]]  (Abnormal) Collected:  01/17/18 0438    Specimen:  Blood Updated:  01/17/18 0529     Protime 17.9 (H) Seconds      INR 1.53 (H)    Creatinine, Serum [[id number]]  (Abnormal) Collected:  01/17/18 0438    Specimen:  Blood Updated:  01/17/18 0529     Creatinine 1.17 (H) mg/dL      eGFR Non African Amer 44 (L) mL/min/1.73     Narrative:       The MDRD GFR formula is only valid for adults with stable renal function between ages 18 and 70.    POC Glucose Once [[id number]]  (Normal) Collected:  01/17/18 0632    Specimen:  Blood Updated:  01/17/18 0633     Glucose 123 mg/dL     Narrative:       Meter: [id number] : [id number] Duque Benge    Body Fluid Culture - Synovial Fluid, Knee, Right [[id number]]  (Normal) Collected:  01/15/18 1404    Specimen:  Synovial Fluid from Knee, Right Updated:  01/17/18 0859     BF Culture No growth at 2 days     Gram Stain Result No organisms seen      Rare (1+) WBCs per low power field           ASSESSMENT & PLAN:  Right knee improving.  Mild swelling in lateral suprapatellar area.  No erythema and less tender to touch.  Cultures remains no growth.     Calf soft and nontender.  Minimal pain.  Plan to start PT today.  PICC line today.  Patient planning to go to [place] or [facility] for rehab      Date: 1/17/2018    JEANNINE Pierson MD

## 2018-01-17 NOTE — PROGRESS NOTES
LOS: 3 days     Chief Complaint:  Follow-up R knee PJI    Interval History:  No acute events. Right knee still mildly warm but not erythematous. Tolerating antibiotics w/o rash or diarrhea.     ROS: no n/v/d    Vital Signs  Temp:  [97.5 °F (36.4 °C)-98.7 °F (37.1 °C)] 97.5 °F (36.4 °C)  Heart Rate:  [79-92] 85  Resp:  [16] 16  BP: (120-135)/(73-92) 135/92    Physical Exam:  General: awake, alert, NAD, very nice, in chair  Head: Normocephalic  Eyes: no scleral icterus  ENT: MMM, OP clear, no thrush. Fair dentition.   Neck: Supple  Cardiovascular: NR, RR, 2/6 KELLIE at LUSB; trace LE edema  Respiratory: normal work of breathing on ambient air  GI: Abdomen is obese, soft, non-tender, non-distended  : no Mancilla catheter present  Musculoskeletal: R knee incision is healed; knee is warm on the lateral portion; there is no current erythema; there is a moderate joint effusion  Skin: No rashes, lesions, or embolic phenomenon  Neurological: Alert and oriented x 3, motor strength 5/5 in all four extremities  Psychiatric: Normal mood and affect   Vasc: no cyanosis; PIV w/o erythema    Antibiotics:  •  ceFAZolin (ANCEF) in SWFI 2 g/20ml IV PUSH syringe, 2 g, Intravenous, Q8H, Mehul Miller MD, 2 g at 01/17/18 0400      LABS:  CBC< BMP, CRP reviewed today; micro too  Lab Results   Component Value Date    WBC 7.64 01/17/2018    HGB 11.4 (L) 01/17/2018    HCT 36.9 01/17/2018    .1 (H) 01/17/2018     01/17/2018     Lab Results   Component Value Date    GLUCOSE 139 (H) 01/16/2018    BUN 25 (H) 01/16/2018    CREATININE 1.17 (H) 01/17/2018    EGFRIFNONA 44 (L) 01/17/2018    BCR 22.5 01/16/2018    CO2 25.5 01/16/2018    CALCIUM 8.2 (L) 01/16/2018    PROTENTOTREF 4.9 (L) 04/08/2014    ALBUMIN 3.70 01/14/2018    LABIL2 1.3 01/14/2018    AST 20 01/14/2018    ALT 18 01/14/2018    CRP 1.38 (H) 01/16/2018     Arthrocentesis R Knee:  8000 WBCs (80% PMNs)  3500 RBCs  No crystals     Microbiology:  1/14 BCx: NGTD  1/15  Synovial Fluid R Knee: NGTD (asked lab to hold 14 days)    Assessment/Plan   1. Prosthetic Right knee infection  -given warmth, erythema, swelling, history of MSSA infection in this same knee, arthrocentesis counts meeting criteria of prosthetic joint infection, and improvement with antibiotics since  -unfortunately she is a very poor surgical candidate and would rquire a 2-stage exchange  -therefore next best option is to give 6 weeks of cefazolin 2 g IV q8h with stop date 2/26/18 followed by oral suppression with cephalexin (targeting prior MSSA and possible Strep)  -order PICC  -weekly CBC w/ diff, Crt, CRP faxed to me at [phone]  -f/u with me ID clinic on 2/26/18 at 1:10 PM  -I asked lab to hold cultures for 14 days given antecedent antibiotic exposure    2. Paroxysmal Atrial fibrillation  -on coumadin  -cardiology following    Thank you for allowing me to be involved in the care of this patient. Infectious diseases will sign off at this time with antibiotics plan in place but please call me at [phone] if any further questions.  Case d/w Dr Hunter and Dr Wilkins.

## 2018-01-17 NOTE — THERAPY EVALUATION
Acute Care - Physical Therapy Initial Evaluation  [facility]     Patient Name: Caitlyn Longoria  : 1934  MRN: [id number]  Today's Date: 2018   Onset of Illness/Injury or Date of Surgery Date: 18  Date of Referral to PT: 18  Referring Physician: Dr. Hunter      Admit Date: 2018     Visit Dx:    ICD-10-CM ICD-9-CM   1. Infection of right knee M00.9 711.96   2. Knee effusion, right M25.461 719.06   3. Difficulty walking R26.2 719.7     Patient Active Problem List   Diagnosis   • Neck and shoulder pain   • Arthropathy of shoulder region   • Carpal tunnel syndrome of left wrist   • Chronic pain of both shoulders   • Chronic left shoulder pain   • Arthropathy of left shoulder   • Dysarthria   • DM type 2 (diabetes mellitus, type 2)   • Atrial fibrillation   • HLD (hyperlipidemia)   • Bronchitis   • CAD (coronary artery disease), hx of stent   • CVA (cerebral vascular accident)   • HTN (hypertension)   • CKD (chronic kidney disease) stage 3, GFR 30-59 ml/min   • Chronic combined systolic and diastolic congestive heart failure   • Infection of prosthetic right knee joint     Past Medical History:   Diagnosis Date   • Abdominal pain    • Cough    • Diarrhea    • Difficulty breathing    • Dizziness    • DM type 2 (diabetes mellitus, type 2)      Past Surgical History:   Procedure Laterality Date   • CARDIAC CATHETERIZATION     • CHOLECYSTECTOMY     • CORONARY STENT PLACEMENT     • HERNIA REPAIR     • HYSTERECTOMY     • PACEMAKER IMPLANTATION     • REPLACEMENT TOTAL KNEE            PT ASSESSMENT (last 72 hours)      PT Evaluation       18 1455 18 0400    Rehab Evaluation    Document Type evaluation  -KH     Subjective Information no complaints;agree to therapy  -KH     Patient Effort, Rehab Treatment good  -KH     Symptoms Noted During/After Treatment none  -KH     General Information    Patient Profile Review yes  -KH     Onset of Illness/Injury or Date of Surgery Date 18   -     Referring Physician Dr. Hunter  -     General Observations sitting in recliner  -KH     Pertinent History Of Current Problem R knee infection- treated with anti-biotics and draining  -     Precautions/Limitations fall precautions  -KH     Prior Level of Function independent:  -KH     Equipment Currently Used at Home cane, straight  -     Plans/Goals Discussed With patient  -KH     Risks Reviewed patient:  -KH     Benefits Reviewed patient:  -KH     Barriers to Rehab none identified  -KH     Living Environment    Lives With alone  -KH     Living Arrangements house  -KH     Home Accessibility stairs to enter home  -KH     Number of Stairs to Enter Home 4  -KH     Stair Railings at Home outside, present on left side  -KH     Type of Financial/Environmental Concern none  -KH     Transportation Available car;family or friend will provide  -     Clinical Impression    Date of Referral to PT 01/17/18  -     PT Diagnosis Difficulty walking  -     Prognosis good  -KH     Functional Level At Time Of Evaluation SBA  -KH     Patient/Family Goals Statement to get stronger and go home  -     Criteria for Skilled Therapeutic Interventions Met yes;treatment indicated  -     Pathology/Pathophysiology Noted (Describe Specifically for Each System) musculoskeletal  -     Impairments Found (describe specific impairments) aerobic capacity/endurance;gait, locomotion, and balance  -     Functional Limitations in Following Categories (Describe Specific Limitations) self-care;home management;community/leisure  -     Disability: Inability to Perform Actions/Activities of Required Roles (describe specific disability) community/leisure  -     Risk Reduction/Prevention (Describe Specific Areas of risk reduction/prevention) risk factors;recurrence of condition;secondary impairments  -KH     Rehab Potential good, to achieve stated therapy goals  -     Pain Assessment    Pain Assessment No/denies pain  -      Vision Assessment/Intervention    Visual Impairment WFL  -     Cognitive Assessment/Intervention    Current Cognitive/Communication Assessment functional  -     Orientation Status oriented x 4  -     Follows Commands/Answers Questions 100% of the time  -     Personal Safety WNL/WFL  -     Personal Safety Interventions gait belt;fall prevention program maintained;nonskid shoes/slippers when out of bed;supervised activity  -     Short/Long Term Memory intact short term memory;intact long term memory  -     ROM (Range of Motion)    General ROM Detail slightly decrease B knee AROM due to stiffness  -     MMT (Manual Muscle Testing)    General MMT Assessment Detail B LE grossy 4 to 4+/5  -     Muscle Tone Assessment    Bilateral Upper Extremities Muscle Tone Assessment  --   no drift  -DE    Bed Mobility, Assessment/Treatment    Bed Mob, Supine to Sit, [place] not tested  -     Bed Mob, Sit to Supine, [place] not tested  -     Bed Mobility, Comment Patient up in chair  -     Transfer Assessment/Treatment    Transfers, Sit-Stand [place] stand by assist;contact guard assist  -     Transfers, Stand-Sit [place] stand by assist;contact guard assist  -     Transfers, Sit-Stand-Sit, Assist Device rolling walker  -     Transfer, Safety Issues step length decreased;weight-shifting ability decreased;balance decreased during turns  -     Gait Assessment/Treatment    Gait, [place] Level stand by assist;contact guard assist  -     Gait, Assistive Device rolling walker  -     Gait, Distance (Feet) 100  -     Gait, Gait Deviations jeremi decreased;forward flexed posture;step length decreased;stride length decreased  -     Gait, Comment Patient safe to walk in room independently, recommended patient call nursing if she wants to walk in hallway  -     Therapy Exercises    Bilateral Lower Extremities AROM:;10 reps;sitting;ankle pumps/circles;quad sets;LAQ  -      Positioning and Restraints    Pre-Treatment Position sitting in chair/recliner  -     Post Treatment Position chair  -KH     In Chair reclined;call light within reach;encouraged to call for assist  -       01/16/18 0000 01/15/18 2035    Muscle Tone Assessment    Muscle Tone Assessment  Bilateral Upper Extremities  -DE    Bilateral Upper Extremities Muscle Tone Assessment other (see comments)   no drifts  -DE other (see comments)   no drifts  -DE      01/15/18 1603 01/14/18 1840    General Information    Equipment Currently Used at Home cane, straight  -VA     Living Environment    Lives With alone  -VA alone  -KJ    Living Arrangements house  -VA house  -KJ    Home Accessibility stairs to enter home  -VA stairs to enter home  -KJ    Number of Stairs to Enter Home  4  -KJ    Stair Railings at Home outside, present at both sides  -VA outside, present at both sides  -KJ    Type of Financial/Environmental Concern none  -VA none  -KJ    Transportation Available car;family or friend will provide  -VA car;family or friend will provide  -KJ      01/14/18 1838 01/14/18 1812    General Information    Equipment Currently Used at Home cane, straight  -KJ     Living Environment    Transportation Available  car  -KJ      User Key  (r) = Recorded By, (t) = Taken By, (c) = Cosigned By    Initials Name Provider Type    [facility] Marcy Lopez, RN Case Manager    MARIA LUZ English, RN Registered Nurse    ISIDRO Espinoza, PT Physical Therapist    JOSUÉ Escalante, RN Registered Nurse          Physical Therapy Education     Title: PT OT SLP Therapies (Active)     Topic: Physical Therapy (Done)     Point: Mobility training (Done)    Learning Progress Summary    Learner Readiness Method Response Comment Documented by Status   Patient Acceptance E [facility] 01/17/18 0609 Done               Point: Home exercise program (Done)    Learning Progress Summary    Learner Readiness Method Response Comment Documented by Status   Patient  Acceptance E [facility] 01/17/18 1459 Done               Point: Body mechanics (Done)    Learning Progress Summary    Learner Readiness Method Response Comment Documented by Status   Patient Acceptance E [facility] 01/17/18 1459 Done               Point: Precautions (Done)    Learning Progress Summary    Learner Readiness Method Response Comment Documented by Status   Patient Acceptance E [facility] 01/17/18 1459 Done                      User Key     Initials Effective Dates Name Provider Type Discipline     06/22/16 -  Marleen Espinoza, PT Physical Therapist PT                PT Recommendation and Plan  Anticipated Equipment Needs At Discharge: front wheeled walker  Anticipated Discharge Disposition: inpatient rehabilitation facility, skilled nursing facility  Planned Therapy Interventions: bed mobility training, balance training, gait training, home exercise program, strengthening, stair training, transfer training, postural re-education, patient/family education  PT Frequency: daily  Plan of Care Review  Plan Of Care Reviewed With: patient  Progress: improving  Outcome Summary/Follow up Plan: Patient is an 84 y/o female admited with R knee pain due to a R TKA infection. Patient was treated conservatively with antibiotics which seems to be reducing her pain, however she continues to complain of weakness and stiffness. She lives alone, and therefore would benefit from skilled therapy while in acute care as well as D/C to rehab for further strengthening          IP PT Goals       01/17/18 1459          Bed Mobility PT LTG    Bed Mobility PT LTG, Date Established 01/17/18  -      Bed Mobility PT LTG, Time to Achieve 1 wk  -      Bed Mobility PT LTG, Activity Type all bed mobility  -      Bed Mobility PT LTG, [place] Level independent  -      Bed Mobility PT Goal  LTG, Assist Device bed rails  -      Transfer Training 2 PT LTG    Transfer Training PT 2 LTG, Date Established 01/17/18  -      Transfer Training PT 2  LTG, Time to Achieve 1 wk  -KH      Transfer Training PT 2 LTG, Activity Type all transfers  -KH      Transfer Training PT 2 LTG, [place] Level conditional independence  -KH      Transfer Training PT 2 LTG, Assist Device walker, rolling  -KH      Gait Training PT LTG    Gait Training Goal PT LTG, Date Established 01/17/18  -KH      Gait Training Goal PT LTG, Time to Achieve 1 wk  -KH      Gait Training Goal PT LTG, [place] Level conditional independence  -KH      Gait Training Goal PT LTG, Assist Device walker, rolling  -KH      Gait Training Goal PT LTG, Distance to Achieve 150  -KH        User Key  (r) = Recorded By, (t) = Taken By, (c) = Cosigned By    Initials Name Provider Type    ISIDRO Espinoza PT Physical Therapist                Outcome Measures       01/17/18 1500          How much help from another person do you currently need...    Turning from your back to your side while in flat bed without using bedrails? 3  -KH      Moving from lying on back to sitting on the side of a flat bed without bedrails? 3  -KH      Moving to and from a bed to a chair (including a wheelchair)? 3  -KH      Standing up from a chair using your arms (e.g., wheelchair, bedside chair)? 3  -KH      Climbing 3-5 steps with a railing? 3  -KH      To walk in hospital room? 3  -KH      AM-PAC 6 Clicks Score 18  -KH      Functional Assessment    Outcome Measure Options AM-PAC 6 Clicks Basic Mobility (PT)  -KH        User Key  (r) = Recorded By, (t) = Taken By, (c) = Cosigned By    Initials Name Provider Type    ISIDRO Espinoza PT Physical Therapist           Time Calculation:         PT Charges       01/17/18 1502          Time Calculation    Start Time 1440  -KH      Stop Time 1503  -KH      Time Calculation (min) 23 min  -KH      PT Received On 01/17/18  -ISIDRO      PT - Next Appointment 01/18/18  -ISIDRO        User Key  (r) = Recorded By, (t) = Taken By, (c) = Cosigned By    Initials Name Provider Type    ISIDRO Espinoza PT  Physical Therapist          Therapy Charges for Today     Code Description Service Date Service Provider Modifiers Qty    [id number] HC PT EVAL LOW COMPLEXITY 2 1/17/2018 Marleen Espinoza, PT GP 1          PT G-Codes  Outcome Measure Options: AM-PAC 6 Clicks Basic Mobility (PT)      Marleen Espinoza, PT  1/17/2018

## 2018-01-17 NOTE — NURSING NOTE
Attempted to place PICC line to [place], and was unsuccessful. I was able to cannulate the vein and thread the guidewire. Yet was unable to place  PICC . Will need to send pt to radiology for placement.

## 2018-01-17 NOTE — PROGRESS NOTES
"CC: CAD/PAF    Interval History:   Feels better, no complaints    Vital Signs  Temp:  [97.6 °F (36.4 °C)-98.7 °F (37.1 °C)] 97.6 °F (36.4 °C)  Heart Rate:  [79-92] 84  Resp:  [16] 16  BP: (120-135)/(73-82) 135/79    Intake/Output Summary (Last 24 hours) at 01/17/18 0707  Last data filed at 01/16/18 1700   Gross per 24 hour   Intake              720 ml   Output                0 ml   Net              720 ml     Flowsheet Rows         First Filed Value    Admission Height  162.6 cm (64\") Documented at 01/14/2018 1252    Admission Weight  90.7 kg (200 lb) Documented at 01/14/2018 1252          PHYSICAL EXAM:  General: No acute distress  Resp:NL Rate, unlabored, clear  CV:NL rate and rhythm, NL PMI, Nl S1 and S2, 2/6 systolic Mumur, no gallop, no rub, No JVD. Normal pedal pulses  ABD:Nl sounds, no masses or tenderness, nondistended, no quarding or rebound  Neuro: alert,cooperative and oriented  Extr: No edema or cyanosis, moves all extremities      Results Review:      Results from last 7 days  Lab Units 01/17/18 0438 01/16/18  0345   SODIUM mmol/L  --  143   POTASSIUM mmol/L  --  3.8   CHLORIDE mmol/L  --  104   CO2 mmol/L  --  25.5   BUN mg/dL  --  25*   CREATININE mg/dL 1.17* 1.11*   GLUCOSE mg/dL  --  139*   CALCIUM mg/dL  --  8.2*           Results from last 7 days  Lab Units 01/17/18  0438   WBC 10*3/mm3 7.64   HEMOGLOBIN g/dL 11.4*   HEMATOCRIT % 36.9   PLATELETS 10*3/mm3 181       Results from last 7 days  Lab Units 01/17/18 0438 01/16/18 0345 01/15/18  0423   INR  1.53* 1.84* 2.44*                 @LABRCNT(bnp)@  I reviewed the patient's new clinical results.  I personally viewed and interpreted the patient's EKG/Telemetry data        Medication Review:   Meds reviewed         Assessment/Plan   1.  Possible septic right knee. Knee was drained yesterday results pending.  We are holding warfarin her INR is 1.5. We'll give a dose of Lovenox.  Increase her warfarin.  If there is a plan for surgery obviously this " will have to be changed.  Infectious disease suggestions noted.  2.  History of coronary artery disease with previous occlusion of the mid left anterior descending stenting of the first diagonal with a drug-eluting stent catheterization October 2012 restenosed the diagonal vessel with repeat drug-eluting stent placement of that left ventricular ejection fraction 50%.  Echocardiogram showed left ventricular ejection fraction of 23%.  3.  Ischemic cardio myopathy as above.  4.  Paroxysmal atrial fibrillation/sick sinus and now status post permanent pacemaker implantation status post atrial flutter ablation status post RV lead revision chads 2 vascular score 6 high risk for embolic event as above.  5.  Chronic dyspnea  underlying heart and lung disease as well as maybe a paralyzed left hemidiaphragm.  6.  History of diabetes mellitus.  7.  History of hypertension follow blood pressure  8.  History of hyperlipidemia intolerant of multiple statins.  9.  History of obstructive sleep apnea and pulmonary hypertension.   10.  Recent cerebral vascular event.  May have come from ulcerated plaque in the left common carotid artery ever could've been embolic from her heart.  As outlined above would also like to resume antiplatelets.        Rommel Veliz MD  01/17/18  7:07 AM

## 2018-01-17 NOTE — PLAN OF CARE
Problem: Patient Care Overview (Adult)  Goal: Plan of Care Review  Outcome: Ongoing (interventions implemented as appropriate)   01/17/18 5504   Patient Care Overview   Progress improving   Coping/Psychosocial Response Interventions   Plan Of Care Reviewed With patient   Outcome Evaluation   Outcome Summary/Follow up Plan Pt remains on antibiotics for R knee inf. Current plan is to continue ABT vs surgery. IV team attempted PICC placement will need placed by radiology in the AM. Pain well controlled. Refused meds today. Reviewed diet and monitoring of BS. Hopes to go to rehab tomorrow or Friday     Goal: Adult Individualization and Mutuality  Outcome: Ongoing (interventions implemented as appropriate)    Goal: Discharge Needs Assessment  Outcome: Ongoing (interventions implemented as appropriate)      Problem: Mobility, Physical Impaired (Adult)  Goal: Enhanced Mobility Skills  Outcome: Ongoing (interventions implemented as appropriate)    Goal: Enhanced Functionality Ability  Outcome: Ongoing (interventions implemented as appropriate)      Problem: Infection, Risk/Actual (Adult)  Goal: Infection Prevention/Resolution  Outcome: Ongoing (interventions implemented as appropriate)      Problem: Fall Risk (Adult)  Goal: Absence of Falls  Outcome: Ongoing (interventions implemented as appropriate)

## 2018-01-17 NOTE — PROGRESS NOTES
Name: Caitlyn Longoria ADMIT: 2018   : 1934  PCP: Zenaida Suarez MD    MRN: [id number] LOS: 3 days   AGE/SEX: 83 y.o. female  ROOM: [facility]     Subjective   Subjective:  Symptoms:  No shortness of breath or chest pain.    Diet:  Adequate intake.  No nausea.    Pain:  She reports no pain.    Knee pain better. Denies any other complaints. Able to ambulate.    Objective   Vital Signs  Temp:  [97.5 °F (36.4 °C)-98.7 °F (37.1 °C)] 97.5 °F (36.4 °C)  Heart Rate:  [79-92] 85  Resp:  [16] 16  BP: (120-135)/(73-92) 135/92  SpO2:  [90 %-98 %] 90 %  on   ;   O2 Device: room air  Body mass index is 34.33 kg/(m^2).    Physical Exam   Constitutional: She is oriented to person, place, and time. She appears well-developed. She is cooperative. No distress.   Neck: No JVD present. No tracheal deviation present. No thyromegaly present.   Cardiovascular: Normal rate and regular rhythm.    Murmur heard.  Pulmonary/Chest: Effort normal and breath sounds normal. No respiratory distress.   Abdominal: Soft. Normal appearance and bowel sounds are normal. She exhibits no distension. There is no tenderness.   Musculoskeletal: She exhibits edema.   The right knee is mildly warm to the touch.  She has edema surrounding the joint and pain with range of motion.    Neurological: She is alert and oriented to person, place, and time.   Skin: Skin is warm and dry. No rash noted.   Psychiatric: She has a normal mood and affect. Her behavior is normal.   Nursing note and vitals reviewed.      Results Review:       I reviewed the patient's new clinical results.    Results from last 7 days  Lab Units 01/17/18  0438 01/16/18  0345 01/15/18  0423 18  1338   WBC 10*3/mm3 7.64 6.33 6.17 7.35   HEMOGLOBIN g/dL 11.4* 10.4* 10.9* 12.1   PLATELETS 10*3/mm3 181 170 162 185       Results from last 7 days  Lab Units 18  0438 18  0345 01/15/18  0423 18  1338   SODIUM mmol/L  --  143 146* 140   POTASSIUM mmol/L  --   3.8 3.7 3.9   CHLORIDE mmol/L  --  104 104 99   CO2 mmol/L  --  25.5 29.1* 27.1   BUN mg/dL  --  25* 23 26*   CREATININE mg/dL 1.17* 1.11* 1.03* 1.02*   GLUCOSE mg/dL  --  139* 122* 122*   ALBUMIN g/dL  --   --   --  3.70   BILIRUBIN mg/dL  --   --   --  0.9   ALK PHOS U/L  --   --   --  89   AST (SGOT) U/L  --   --   --  20   ALT (SGPT) U/L  --   --   --  18   Estimated Creatinine Clearance: 39.7 mL/min (by C-G formula based on Cr of 1.17).    Results from last 7 days  Lab Units 01/16/18  0345 01/15/18  0423 01/14/18  1338   CALCIUM mg/dL 8.2* 8.2* 8.8   ALBUMIN g/dL  --   --  3.70         Results from last 7 days  Lab Units 01/16/18  0345 01/14/18  1338   LACTATE mmol/L  --  1.5   SED RATE mm/hr  --  34*   CRP mg/dL 1.38* 1.99*       Results from last 7 days  Lab Units 01/15/18  1404 01/14/18  1411 01/14/18  1338   BLOODCX   --  No growth at 2 days No growth at 2 days   BODYFLDCX  No growth at 2 days  --   --    GRAM STAIN RESULT  No organisms seen  Rare (1+) WBCs per low power field  --   --        Results from last 7 days  Lab Units 01/17/18  0438 01/16/18  0345 01/15/18  0423 01/14/18  1338   PROTIME Seconds 17.9* 20.6* 25.7* 24.7*   INR  1.53* 1.84* 2.44* 2.32*     Glucose   Date/Time Value Ref Range Status   01/17/2018 0632 123 70 - 130 mg/dL Final   01/16/2018 2105 157 (H) 70 - 130 mg/dL Final   01/16/2018 1600 121 70 - 130 mg/dL Final   01/16/2018 1130 160 (H) 70 - 130 mg/dL Final   01/16/2018 0626 131 (H) 70 - 130 mg/dL Final   01/15/2018 2105 158 (H) 70 - 130 mg/dL Final   01/15/2018 1639 163 (H) 70 - 130 mg/dL Final   01/15/2018 1048 140 (H) 70 - 130 mg/dL Final         amitriptyline 25 mg Oral Nightly   carvedilol 3.125 mg Oral Q12H   ceFAZolin 2 g Intravenous Q8H   enoxaparin 1 mg/kg Subcutaneous Once   famotidine 40 mg Oral Nightly   fluticasone 2 spray Each Nare Daily   insulin aspart 0-7 Units Subcutaneous 4x Daily With Meals & Nightly   loperamide 2 mg Oral Daily   oxazepam 10 mg Oral Nightly    oxybutynin XL 10 mg Oral Daily   pantoprazole 40 mg Oral BID With Meals   rosuvastatin 20 mg Oral Daily   saccharomyces boulardii 250 mg Oral BID   warfarin 7.5 mg Oral Once      Diet Regular; Consistent Carbohydrate      Assessment/Plan   Assessment:     Active Hospital Problems (** Indicates Principal Problem)    Diagnosis Date Noted   • **Infection of prosthetic right knee joint [T84.53XA] 01/17/2018   • Chronic combined systolic and diastolic congestive heart failure [I50.42] 01/15/2018   • HTN (hypertension) [I10] 01/14/2018   • CKD (chronic kidney disease) stage 3, GFR 30-59 ml/min [N18.3] 01/14/2018   • DM type 2 (diabetes mellitus, type 2) [E11.9] 12/07/2017   • Atrial fibrillation [I48.91] 12/07/2017   • CAD (coronary artery disease), hx of stent [I25.10] 12/07/2017      Resolved Hospital Problems    Diagnosis Date Noted Date Resolved   • Hypernatremia [E87.0] 01/15/2018 01/16/2018       · Continue to hold losartan  · On empiric Kefzol for septic arthritis.  · Blood sugars stable.  · Discussed with Dr. Miller. OR has been suggested. Dr. Hunter following.  · Discharge once plans finalized. Patient states if prescribed prolonged IV antibiotics may need SNU placement.      Shamir Wilkins MD  [facility]  01/17/18  7:36 AM

## 2018-01-17 NOTE — PLAN OF CARE
Problem: Patient Care Overview (Adult)  Goal: Plan of Care Review  Outcome: Ongoing (interventions implemented as appropriate)   01/17/18 1459   Patient Care Overview   Progress improving   Coping/Psychosocial Response Interventions   Plan Of Care Reviewed With patient   Outcome Evaluation   Outcome Summary/Follow up Plan Patient is an 82 y/o female admited with R knee pain due to a R TKA infection. Patient was treated conservatively with antibiotics which seems to be reducing her pain, however she continues to complain of weakness and stiffness. She lives alone, and therefore would benefit from skilled therapy while in acute care as well as D/C to rehab for further strengthening       Problem: Inpatient Physical Therapy  Goal: Bed Mobility Goal LTG- PT  Outcome: Ongoing (interventions implemented as appropriate)   01/17/18 1459   Bed Mobility PT LTG   Bed Mobility PT LTG, Date Established 01/17/18   Bed Mobility PT LTG, Time to Achieve 1 wk   Bed Mobility PT LTG, Activity Type all bed mobility   Bed Mobility PT LTG, [place] Level independent   Bed Mobility PT Goal LTG, Assist Device bed rails     Goal: Transfer Training Goal 2 LTG- PT  Outcome: Ongoing (interventions implemented as appropriate)   01/17/18 1459   Transfer Training 2 PT LTG   Transfer Training PT 2 LTG, Date Established 01/17/18   Transfer Training PT 2 LTG, Time to Achieve 1 wk   Transfer Training PT 2 LTG, Activity Type all transfers   Transfer Training PT 2 LTG, [place] Level conditional independence   Transfer Training PT 2 LTG, Assist Device walker, rolling     Goal: Gait Training Goal LTG- PT  Outcome: Ongoing (interventions implemented as appropriate)   01/17/18 1459   Gait Training PT LTG   Gait Training Goal PT LTG, Date Established 01/17/18   Gait Training Goal PT LTG, Time to Achieve 1 wk   Gait Training Goal PT LTG, [place] Level conditional independence   Gait Training Goal PT LTG, Assist Device walker, rolling   Gait  Training Goal PT LTG, Distance to Achieve 150

## 2018-01-17 NOTE — PLAN OF CARE
Problem: Patient Care Overview (Adult)  Goal: Plan of Care Review  Outcome: Ongoing (interventions implemented as appropriate)   01/17/18 0530   Patient Care Overview   Progress improving   Coping/Psychosocial Response Interventions   Plan Of Care Reviewed With patient   Outcome Evaluation   Outcome Summary/Follow up Plan Pt has done well through the night. Receiving ABX IV Q8, possible PICC line placement tomorrow. No c/o pain at this time. Mobilizing with walker and staff assistance. Voiding adequately. Education provided on DM management with glucose monitoring and insulin therapy.      Goal: Adult Individualization and Mutuality  Outcome: Ongoing (interventions implemented as appropriate)    Goal: Discharge Needs Assessment  Outcome: Ongoing (interventions implemented as appropriate)      Problem: Mobility, Physical Impaired (Adult)  Goal: Enhanced Mobility Skills  Outcome: Ongoing (interventions implemented as appropriate)   01/17/18 0530   Mobility, Physical Impaired (Adult)   Enhanced Mobility Skills achieves outcome     Goal: Enhanced Functionality Ability  Outcome: Ongoing (interventions implemented as appropriate)   01/17/18 0530   Mobility, Physical Impaired (Adult)   Enhanced Functionality Ability achieves outcome       Problem: Infection, Risk/Actual (Adult)  Goal: Infection Prevention/Resolution  Outcome: Ongoing (interventions implemented as appropriate)   01/17/18 0530   Infection, Risk/Actual (Adult)   Infection Prevention/Resolution achieves outcome       Problem: Fall Risk (Adult)  Goal: Absence of Falls  Outcome: Ongoing (interventions implemented as appropriate)   01/17/18 0530   Fall Risk (Adult)   Absence of Falls achieves outcome

## 2018-01-18 ENCOUNTER — APPOINTMENT (OUTPATIENT)
Dept: GENERAL RADIOLOGY | Facility: HOSPITAL | Age: 83
End: 2018-01-18

## 2018-01-18 LAB
CREAT BLD-MCNC: 0.99 MG/DL (ref 0.57–1)
DEPRECATED RDW RBC AUTO: 58.2 FL (ref 37–54)
ERYTHROCYTE [DISTWIDTH] IN BLOOD BY AUTOMATED COUNT: 15.5 % (ref 11.7–13)
GFR SERPL CREATININE-BSD FRML MDRD: 54 ML/MIN/1.73
GLUCOSE BLDC GLUCOMTR-MCNC: 113 MG/DL (ref 70–130)
GLUCOSE BLDC GLUCOMTR-MCNC: 136 MG/DL (ref 70–130)
GLUCOSE BLDC GLUCOMTR-MCNC: 155 MG/DL (ref 70–130)
GLUCOSE BLDC GLUCOMTR-MCNC: 249 MG/DL (ref 70–130)
HCT VFR BLD AUTO: 35.7 % (ref 35.6–45.5)
HGB BLD-MCNC: 11.1 G/DL (ref 11.9–15.5)
INR PPP: 1.69 (ref 0.9–1.1)
MCH RBC QN AUTO: 32.3 PG (ref 26.9–32)
MCHC RBC AUTO-ENTMCNC: 31.1 G/DL (ref 32.4–36.3)
MCV RBC AUTO: 103.8 FL (ref 80.5–98.2)
PLATELET # BLD AUTO: 162 10*3/MM3 (ref 140–500)
PMV BLD AUTO: 9.7 FL (ref 6–12)
PROTHROMBIN TIME: 19.3 SECONDS (ref 11.7–14.2)
RBC # BLD AUTO: 3.44 10*6/MM3 (ref 3.9–5.2)
WBC NRBC COR # BLD: 5.8 10*3/MM3 (ref 4.5–10.7)

## 2018-01-18 PROCEDURE — 99231 SBSQ HOSP IP/OBS SF/LOW 25: CPT | Performed by: NURSE PRACTITIONER

## 2018-01-18 PROCEDURE — C1751 CATH, INF, PER/CENT/MIDLINE: HCPCS

## 2018-01-18 PROCEDURE — 82962 GLUCOSE BLOOD TEST: CPT

## 2018-01-18 PROCEDURE — 97110 THERAPEUTIC EXERCISES: CPT

## 2018-01-18 PROCEDURE — 85610 PROTHROMBIN TIME: CPT | Performed by: INTERNAL MEDICINE

## 2018-01-18 PROCEDURE — 99233 SBSQ HOSP IP/OBS HIGH 50: CPT | Performed by: INTERNAL MEDICINE

## 2018-01-18 PROCEDURE — B518ZZA FLUOROSCOPY OF SUPERIOR VENA CAVA, GUIDANCE: ICD-10-PCS | Performed by: INTERNAL MEDICINE

## 2018-01-18 PROCEDURE — 25010000002 CEFAZOLIN PER 500 MG: Performed by: INTERNAL MEDICINE

## 2018-01-18 PROCEDURE — 94799 UNLISTED PULMONARY SVC/PX: CPT

## 2018-01-18 PROCEDURE — 85027 COMPLETE CBC AUTOMATED: CPT | Performed by: INTERNAL MEDICINE

## 2018-01-18 PROCEDURE — 77001 FLUOROGUIDE FOR VEIN DEVICE: CPT

## 2018-01-18 PROCEDURE — 02HV33Z INSERTION OF INFUSION DEVICE INTO SUPERIOR VENA CAVA, PERCUTANEOUS APPROACH: ICD-10-PCS | Performed by: INTERNAL MEDICINE

## 2018-01-18 PROCEDURE — 63710000001 INSULIN ASPART PER 5 UNITS: Performed by: INTERNAL MEDICINE

## 2018-01-18 PROCEDURE — 76937 US GUIDE VASCULAR ACCESS: CPT

## 2018-01-18 PROCEDURE — 82565 ASSAY OF CREATININE: CPT | Performed by: INTERNAL MEDICINE

## 2018-01-18 PROCEDURE — 25010000002 ENOXAPARIN PER 10 MG: Performed by: INTERNAL MEDICINE

## 2018-01-18 RX ORDER — WARFARIN SODIUM 7.5 MG/1
7.5 TABLET ORAL
Status: COMPLETED | OUTPATIENT
Start: 2018-01-18 | End: 2018-01-18

## 2018-01-18 RX ORDER — ASPIRIN 81 MG/1
81 TABLET ORAL DAILY
Status: DISCONTINUED | OUTPATIENT
Start: 2018-01-18 | End: 2018-01-19 | Stop reason: HOSPADM

## 2018-01-18 RX ORDER — SODIUM CHLORIDE 0.9 % (FLUSH) 0.9 %
10 SYRINGE (ML) INJECTION EVERY 12 HOURS SCHEDULED
Status: DISCONTINUED | OUTPATIENT
Start: 2018-01-18 | End: 2018-01-19 | Stop reason: HOSPADM

## 2018-01-18 RX ADMIN — ASPIRIN 81 MG: 81 TABLET ORAL at 08:07

## 2018-01-18 RX ADMIN — LOPERAMIDE HYDROCHLORIDE 2 MG: 2 CAPSULE ORAL at 08:07

## 2018-01-18 RX ADMIN — PANTOPRAZOLE SODIUM 40 MG: 40 TABLET, DELAYED RELEASE ORAL at 17:19

## 2018-01-18 RX ADMIN — CEFAZOLIN 2 G: 10 INJECTION, POWDER, FOR SOLUTION INTRAVENOUS at 13:18

## 2018-01-18 RX ADMIN — Medication 10 ML: at 22:55

## 2018-01-18 RX ADMIN — CARVEDILOL 3.12 MG: 3.12 TABLET, FILM COATED ORAL at 08:07

## 2018-01-18 RX ADMIN — CEFAZOLIN 2 G: 10 INJECTION, POWDER, FOR SOLUTION INTRAVENOUS at 03:30

## 2018-01-18 RX ADMIN — INSULIN ASPART 2 UNITS: 100 INJECTION, SOLUTION INTRAVENOUS; SUBCUTANEOUS at 21:37

## 2018-01-18 RX ADMIN — WARFARIN SODIUM 7.5 MG: 7.5 TABLET ORAL at 17:19

## 2018-01-18 RX ADMIN — Medication 250 MG: at 08:07

## 2018-01-18 RX ADMIN — FAMOTIDINE 40 MG: 40 TABLET, FILM COATED ORAL at 21:36

## 2018-01-18 RX ADMIN — OXYBUTYNIN CHLORIDE 10 MG: 5 TABLET, EXTENDED RELEASE ORAL at 21:36

## 2018-01-18 RX ADMIN — CEFAZOLIN 2 G: 10 INJECTION, POWDER, FOR SOLUTION INTRAVENOUS at 21:38

## 2018-01-18 RX ADMIN — CARVEDILOL 3.12 MG: 3.12 TABLET, FILM COATED ORAL at 21:36

## 2018-01-18 RX ADMIN — FLUTICASONE PROPIONATE 2 SPRAY: 50 SPRAY, METERED NASAL at 08:09

## 2018-01-18 RX ADMIN — ROSUVASTATIN CALCIUM 20 MG: 20 TABLET, FILM COATED ORAL at 08:07

## 2018-01-18 RX ADMIN — OXAZEPAM 10 MG: 10 CAPSULE, GELATIN COATED ORAL at 21:36

## 2018-01-18 RX ADMIN — ENOXAPARIN SODIUM 90 MG: 100 INJECTION SUBCUTANEOUS at 08:08

## 2018-01-18 RX ADMIN — INSULIN ASPART 3 UNITS: 100 INJECTION, SOLUTION INTRAVENOUS; SUBCUTANEOUS at 13:22

## 2018-01-18 RX ADMIN — Medication 250 MG: at 21:36

## 2018-01-18 RX ADMIN — PANTOPRAZOLE SODIUM 40 MG: 40 TABLET, DELAYED RELEASE ORAL at 08:07

## 2018-01-18 RX ADMIN — AMITRIPTYLINE HYDROCHLORIDE 25 MG: 25 TABLET, FILM COATED ORAL at 21:36

## 2018-01-18 NOTE — PROGRESS NOTES
Orthopedic Progress Note      Patient: Caitlyn Longoria  Date of Admission: 1/14/2018  YOB: 1934  Medical Record Number: [id number]    Systemic or Specific Complaints: No Complaints    Pain Relief: complete resolution    Physical Exam:  83 y.o.  female  Vitals:  Temp:  [96.5 °F (35.8 °C)-99.4 °F (37.4 °C)] 96.5 °F (35.8 °C)  Heart Rate:  [79-81] 81  Resp:  [16-18] 18  BP: (118-139)/(73-86) 139/86  alert and oriented  Ext: NV intact. ROM appropriate. Calf is soft and nontender. Negative Homans Sn  Right Knee-no unusual erythema or ecchymosis, some mild warmth noted but no significant effusion.    Activity: Mobilizing Per P.T.   Weight Bearing: As Tolerated    Data Review     Admission on 01/14/2018   Component Date Value Ref Range Status   • Glucose 01/14/2018 122* 65 - 99 mg/dL Final   • BUN 01/14/2018 26* 8 - 23 mg/dL Final   • Creatinine 01/14/2018 1.02* 0.57 - 1.00 mg/dL Final   • Sodium 01/14/2018 140  136 - 145 mmol/L Final   • Potassium 01/14/2018 3.9  3.5 - 5.2 mmol/L Final   • Chloride 01/14/2018 99  98 - 107 mmol/L Final   • CO2 01/14/2018 27.1  22.0 - 29.0 mmol/L Final   • Calcium 01/14/2018 8.8  8.6 - 10.5 mg/dL Final   • Total Protein 01/14/2018 6.6  6.0 - 8.5 g/dL Final   • Albumin 01/14/2018 3.70  3.50 - 5.20 g/dL Final   • ALT (SGPT) 01/14/2018 18  1 - 33 U/L Final   • AST (SGOT) 01/14/2018 20  1 - 32 U/L Final   • Alkaline Phosphatase 01/14/2018 89  39 - 117 U/L Final   • Total Bilirubin 01/14/2018 0.9  0.1 - 1.2 mg/dL Final   • eGFR Non African Amer 01/14/2018 52* >60 mL/min/1.73 Final   • Globulin 01/14/2018 2.9  gm/dL Final   • A/G Ratio 01/14/2018 1.3  g/dL Final   • BUN/Creatinine Ratio 01/14/2018 25.5* 7.0 - 25.0 Final   • Anion Gap 01/14/2018 13.9  mmol/L Final   • Protime 01/14/2018 24.7* 11.7 - 14.2 Seconds Final   • INR 01/14/2018 2.32* 0.90 - 1.10 Final   • Blood Culture 01/14/2018 No growth at 3 days   Preliminary   • Blood Culture 01/14/2018 No growth at 3 days    Preliminary   • Lactate 01/14/2018 1.5  0.5 - 2.0 mmol/L Final   • Sed Rate 01/14/2018 34* 0 - 30 mm/hr Final   • C-Reactive Protein 01/14/2018 1.99* 0.00 - 0.50 mg/dL Final   • WBC 01/14/2018 7.35  4.50 - 10.70 10*3/mm3 Final   • RBC 01/14/2018 3.75* 3.90 - 5.20 10*6/mm3 Final   • Hemoglobin 01/14/2018 12.1  11.9 - 15.5 g/dL Final   • Hematocrit 01/14/2018 37.4  35.6 - 45.5 % Final   • MCV 01/14/2018 99.7* 80.5 - 98.2 fL Final   • MCH 01/14/2018 32.3* 26.9 - 32.0 pg Final   • MCHC 01/14/2018 32.4  32.4 - 36.3 g/dL Final   • RDW 01/14/2018 15.4* 11.7 - 13.0 % Final   • RDW-SD 01/14/2018 55.9* 37.0 - 54.0 fl Final   • MPV 01/14/2018 10.4  6.0 - 12.0 fL Final   • Platelets 01/14/2018 185  140 - 500 10*3/mm3 Final   • Neutrophil % 01/14/2018 44.7  42.7 - 76.0 % Final   • Lymphocyte % 01/14/2018 50.9* 19.6 - 45.3 % Final   • Monocyte % 01/14/2018 2.6* 5.0 - 12.0 % Final   • Eosinophil % 01/14/2018 1.1  0.3 - 6.2 % Final   • Basophil % 01/14/2018 0.3  0.0 - 1.5 % Final   • Immature Grans % 01/14/2018 0.4  0.0 - 0.5 % Final   • Neutrophils, Absolute 01/14/2018 3.29  1.90 - 8.10 10*3/mm3 Final   • Lymphocytes, Absolute 01/14/2018 3.74  0.90 - 4.80 10*3/mm3 Final   • Monocytes, Absolute 01/14/2018 0.19* 0.20 - 1.20 10*3/mm3 Final   • Eosinophils, Absolute 01/14/2018 0.08  0.00 - 0.70 10*3/mm3 Final   • Basophils, Absolute 01/14/2018 0.02  0.00 - 0.20 10*3/mm3 Final   • Immature Grans, Absolute 01/14/2018 0.03  0.00 - 0.03 10*3/mm3 Final   • Glucose 01/14/2018 105  70 - 130 mg/dL Final   • Glucose 01/15/2018 122* 65 - 99 mg/dL Final   • BUN 01/15/2018 23  8 - 23 mg/dL Final   • Creatinine 01/15/2018 1.03* 0.57 - 1.00 mg/dL Final   • Sodium 01/15/2018 146* 136 - 145 mmol/L Final   • Potassium 01/15/2018 3.7  3.5 - 5.2 mmol/L Final   • Chloride 01/15/2018 104  98 - 107 mmol/L Final   • CO2 01/15/2018 29.1* 22.0 - 29.0 mmol/L Final   • Calcium 01/15/2018 8.2* 8.6 - 10.5 mg/dL Final   • eGFR Non African Amer 01/15/2018 51* >60  mL/min/1.73 Final   • BUN/Creatinine Ratio 01/15/2018 22.3  7.0 - 25.0 Final   • Anion Gap 01/15/2018 12.9  mmol/L Final   • Protime 01/15/2018 25.7* 11.7 - 14.2 Seconds Final   • INR 01/15/2018 2.44* 0.90 - 1.10 Final   • WBC 01/15/2018 6.17  4.50 - 10.70 10*3/mm3 Final   • RBC 01/15/2018 3.44* 3.90 - 5.20 10*6/mm3 Final   • Hemoglobin 01/15/2018 10.9* 11.9 - 15.5 g/dL Final   • Hematocrit 01/15/2018 34.9* 35.6 - 45.5 % Final   • MCV 01/15/2018 101.5* 80.5 - 98.2 fL Final   • MCH 01/15/2018 31.7  26.9 - 32.0 pg Final   • MCHC 01/15/2018 31.2* 32.4 - 36.3 g/dL Final   • RDW 01/15/2018 15.4* 11.7 - 13.0 % Final   • RDW-SD 01/15/2018 56.9* 37.0 - 54.0 fl Final   • MPV 01/15/2018 9.9  6.0 - 12.0 fL Final   • Platelets 01/15/2018 162  140 - 500 10*3/mm3 Final   • Neutrophil % 01/15/2018 37.4* 42.7 - 76.0 % Final   • Lymphocyte % 01/15/2018 56.9* 19.6 - 45.3 % Final   • Monocyte % 01/15/2018 3.9* 5.0 - 12.0 % Final   • Eosinophil % 01/15/2018 1.3  0.3 - 6.2 % Final   • Basophil % 01/15/2018 0.2  0.0 - 1.5 % Final   • Immature Grans % 01/15/2018 0.3  0.0 - 0.5 % Final   • Neutrophils, Absolute 01/15/2018 2.31  1.90 - 8.10 10*3/mm3 Final   • Lymphocytes, Absolute 01/15/2018 3.51  0.90 - 4.80 10*3/mm3 Final   • Monocytes, Absolute 01/15/2018 0.24  0.20 - 1.20 10*3/mm3 Final   • Eosinophils, Absolute 01/15/2018 0.08  0.00 - 0.70 10*3/mm3 Final   • Basophils, Absolute 01/15/2018 0.01  0.00 - 0.20 10*3/mm3 Final   • Immature Grans, Absolute 01/15/2018 0.02  0.00 - 0.03 10*3/mm3 Final   • Ovalocytes 01/15/2018 Slight/1+  None Seen Final   • WBC Morphology 01/15/2018 Normal  Normal Final   • Platelet Morphology 01/15/2018 Normal  Normal Final   • Glucose 01/15/2018 114  70 - 130 mg/dL Final   • BF Culture 01/15/2018 No growth at 3 days   Preliminary   • Gram Stain Result 01/15/2018 No organisms seen   Preliminary   • Gram Stain Result 01/15/2018 Rare (1+) WBCs per low power field   Preliminary   • Color, Fluid 01/15/2018 Yellow    Final   • Appearance, Fluid 01/15/2018 Cloudy* Clear Final   • WBC, Fluid 01/15/2018 8200  /mm3 Final   • RBC, Fluid 01/15/2018 3500  /mm3 Final   • Glucose 01/15/2018 140* 70 - 130 mg/dL Final   • Crystals, Fluid 01/15/2018 None Seen   Final   • Neutrophils, Fluid 01/15/2018 80  % Final   • Lymphocytes, Fluid 01/15/2018 17  % Final   • Monocytes, Fluid 01/15/2018 3  % Final   • Glucose 01/15/2018 163* 70 - 130 mg/dL Final   • ASA Platelet Inhibition 01/15/2018 523  ARU Final   • Glucose 01/15/2018 158* 70 - 130 mg/dL Final   • Protime 01/16/2018 20.6* 11.7 - 14.2 Seconds Final   • INR 01/16/2018 1.84* 0.90 - 1.10 Final   • Glucose 01/16/2018 139* 65 - 99 mg/dL Final   • BUN 01/16/2018 25* 8 - 23 mg/dL Final   • Creatinine 01/16/2018 1.11* 0.57 - 1.00 mg/dL Final   • Sodium 01/16/2018 143  136 - 145 mmol/L Final   • Potassium 01/16/2018 3.8  3.5 - 5.2 mmol/L Final   • Chloride 01/16/2018 104  98 - 107 mmol/L Final   • CO2 01/16/2018 25.5  22.0 - 29.0 mmol/L Final   • Calcium 01/16/2018 8.2* 8.6 - 10.5 mg/dL Final   • eGFR Non African Amer 01/16/2018 47* >60 mL/min/1.73 Final   • BUN/Creatinine Ratio 01/16/2018 22.5  7.0 - 25.0 Final   • Anion Gap 01/16/2018 13.5  mmol/L Final   • WBC 01/16/2018 6.33  4.50 - 10.70 10*3/mm3 Final   • RBC 01/16/2018 3.24* 3.90 - 5.20 10*6/mm3 Final   • Hemoglobin 01/16/2018 10.4* 11.9 - 15.5 g/dL Final   • Hematocrit 01/16/2018 33.2* 35.6 - 45.5 % Final   • MCV 01/16/2018 102.5* 80.5 - 98.2 fL Final   • MCH 01/16/2018 32.1* 26.9 - 32.0 pg Final   • MCHC 01/16/2018 31.3* 32.4 - 36.3 g/dL Final   • RDW 01/16/2018 15.4* 11.7 - 13.0 % Final   • RDW-SD 01/16/2018 57.8* 37.0 - 54.0 fl Final   • MPV 01/16/2018 9.9  6.0 - 12.0 fL Final   • Platelets 01/16/2018 170  140 - 500 10*3/mm3 Final   • Glucose 01/16/2018 131* 70 - 130 mg/dL Final   • C-Reactive Protein 01/16/2018 1.38* 0.00 - 0.50 mg/dL Final   • Glucose 01/16/2018 160* 70 - 130 mg/dL Final   • Glucose 01/16/2018 121  70 - 130 mg/dL  Final   • Glucose 01/16/2018 157* 70 - 130 mg/dL Final   • Protime 01/17/2018 17.9* 11.7 - 14.2 Seconds Final   • INR 01/17/2018 1.53* 0.90 - 1.10 Final   • WBC 01/17/2018 7.64  4.50 - 10.70 10*3/mm3 Final   • RBC 01/17/2018 3.58* 3.90 - 5.20 10*6/mm3 Final   • Hemoglobin 01/17/2018 11.4* 11.9 - 15.5 g/dL Final   • Hematocrit 01/17/2018 36.9  35.6 - 45.5 % Final   • MCV 01/17/2018 103.1* 80.5 - 98.2 fL Final   • MCH 01/17/2018 31.8  26.9 - 32.0 pg Final   • MCHC 01/17/2018 30.9* 32.4 - 36.3 g/dL Final   • RDW 01/17/2018 15.2* 11.7 - 13.0 % Final   • RDW-SD 01/17/2018 57.5* 37.0 - 54.0 fl Final   • MPV 01/17/2018 10.2  6.0 - 12.0 fL Final   • Platelets 01/17/2018 181  140 - 500 10*3/mm3 Final   • Creatinine 01/17/2018 1.17* 0.57 - 1.00 mg/dL Final   • eGFR Non African Amer 01/17/2018 44* >60 mL/min/1.73 Final   • Glucose 01/17/2018 123  70 - 130 mg/dL Final   • Glucose 01/17/2018 214* 70 - 130 mg/dL Final   • Glucose 01/17/2018 125  70 - 130 mg/dL Final   • Glucose 01/17/2018 149* 70 - 130 mg/dL Final   • Protime 01/18/2018 19.3* 11.7 - 14.2 Seconds Final   • INR 01/18/2018 1.69* 0.90 - 1.10 Final   • WBC 01/18/2018 5.80  4.50 - 10.70 10*3/mm3 Final   • RBC 01/18/2018 3.44* 3.90 - 5.20 10*6/mm3 Final   • Hemoglobin 01/18/2018 11.1* 11.9 - 15.5 g/dL Final   • Hematocrit 01/18/2018 35.7  35.6 - 45.5 % Final   • MCV 01/18/2018 103.8* 80.5 - 98.2 fL Final   • MCH 01/18/2018 32.3* 26.9 - 32.0 pg Final   • MCHC 01/18/2018 31.1* 32.4 - 36.3 g/dL Final   • RDW 01/18/2018 15.5* 11.7 - 13.0 % Final   • RDW-SD 01/18/2018 58.2* 37.0 - 54.0 fl Final   • MPV 01/18/2018 9.7  6.0 - 12.0 fL Final   • Platelets 01/18/2018 162  140 - 500 10*3/mm3 Final   • Creatinine 01/18/2018 0.99  0.57 - 1.00 mg/dL Final   • eGFR Non African Amer 01/18/2018 54* >60 mL/min/1.73 Final   • Glucose 01/18/2018 113  70 - 130 mg/dL Final       Xr Knee 1 Or 2 View Right    Result Date: 1/14/2018  Narrative: RIGHT KNEE 2 VIEWS  HISTORY: Patient is an  83-year-old female status post right knee arthroplasty 06/20/2014 presenting for evaluation of pain and swelling right knee laterally x5 days.  COMPARISON: None available  FINDINGS: 1. There is moderate joint effusion. 2. Right knee arthroplasty is intact. 3. Diffuse soft tissue calcification suggests previous inflammatory change possible previous hemorrhage. 4. Prominent spurring at the superior and inferior aspect patella, no acute osseous abnormality.  This report was finalized on 1/14/2018 1:57 PM by Dr. Negrito Lewis MD.      Fl Video Swallow With Speech    Result Date: 12/21/2017  Narrative: VIDEO SWALLOW STUDY  HISTORY: Dysphagia.  FINDINGS:  3 minutes 33 seconds fluoroscopy was provided for the speech pathologist during a video swallow study.  Transient penetration was observed with thin liquids.      Impression: Fluoroscopy was provided for the speech pathologist during a video swallow study. For full details please see the speech pathology report.  This report was finalized on 12/21/2017 4:16 PM by Dr. Jonah Kilgore MD.      Xr Chest Pa & Lateral    Result Date: 1/14/2018  Narrative: ONE VIEW PORTABLE CHEST AT 5:28 PM  HISTORY: Cough.  FINDINGS: There is prominent elevation of the left hemidiaphragm unchanged from previous studies including the most recent previous exam dated 12/07/2017. The heart remains enlarged with a pacemaker in place. The lungs are clear except for some minimal vague chronic atelectasis or scarring in the left perihilar region adjacent to the elevated hemidiaphragm.  This report was finalized on 1/14/2018 6:57 PM by Dr. Guille Trotter MD.        Medications:    amitriptyline 25 mg Oral Nightly   aspirin 81 mg Oral Daily   carvedilol 3.125 mg Oral Q12H   ceFAZolin 2 g Intravenous Q8H   famotidine 40 mg Oral Nightly   fluticasone 2 spray Each Nare Daily   insulin aspart 0-7 Units Subcutaneous 4x Daily With Meals & Nightly   loperamide 2 mg Oral Daily   oxazepam 10 mg Oral Nightly    oxybutynin XL 10 mg Oral Daily   pantoprazole 40 mg Oral BID With Meals   rosuvastatin 20 mg Oral Daily   saccharomyces boulardii 250 mg Oral BID   warfarin 7.5 mg Oral Once     •  acetaminophen  •  dextrose  •  dextrose  •  glucagon (human recombinant)  •  Morphine **AND** naloxone  •  ondansetron **OR** ondansetron ODT **OR** ondansetron    Assessment:  Doing well POD  # * No surgery found *    Problem List Items Addressed This Visit     None      Visit Diagnoses     Infection of right knee    -  Primary    Relevant Medications    doxycycline (VIBRAMYICN) 100 MG tablet    Knee effusion, right              Plan:  Continue efforts to mobilize  Continue Pain Control Measures  Continue antibiotics per infectious disease  DVT prophylaxis    Discharge Plan:Rehab tomorrow with IV antibiotics per infectious disease    Micheline Altman, KRISTIN    Date: 1/18/2018  Time: 9:31 AM

## 2018-01-18 NOTE — THERAPY TREATMENT NOTE
Acute Care - Physical Therapy Treatment Note  [facility]     Patient Name: Caitlyn Longoria  : 1934  MRN: [id number]  Today's Date: 2018  Onset of Illness/Injury or Date of Surgery Date: 18  Date of Referral to PT: 18  Referring Physician: Dr. Hunter    Admit Date: 2018    Visit Dx:    ICD-10-CM ICD-9-CM   1. Infection of right knee M00.9 711.96   2. Knee effusion, right M25.461 719.06   3. Difficulty walking R26.2 719.7     Patient Active Problem List   Diagnosis   • Neck and shoulder pain   • Arthropathy of shoulder region   • Carpal tunnel syndrome of left wrist   • Chronic pain of both shoulders   • Chronic left shoulder pain   • Arthropathy of left shoulder   • Dysarthria   • DM type 2 (diabetes mellitus, type 2)   • Atrial fibrillation   • HLD (hyperlipidemia)   • Bronchitis   • CAD (coronary artery disease), hx of stent   • CVA (cerebral vascular accident)   • HTN (hypertension)   • CKD (chronic kidney disease) stage 3, GFR 30-59 ml/min   • Chronic combined systolic and diastolic congestive heart failure   • Infection of prosthetic right knee joint               Adult Rehabilitation Note       18 1622          Rehab Assessment/Intervention    Discipline physical therapist  -      Document Type therapy note (daily note)  -      Subjective Information agree to therapy;no complaints  -      Patient Effort, Rehab Treatment excellent  -      Precautions/Limitations fall precautions  -LH      Recorded by [] Hollie Rosado, PT      Pain Assessment    Pain Assessment No/denies pain  -LH      Recorded by [] Hollie Rosado, PT      Vision Assessment/Intervention    Visual Impairment WNL  -LH      Recorded by [] Hollie Rosado, PT      Cognitive Assessment/Intervention    Current Cognitive/Communication Assessment functional  -      Orientation Status oriented x 4  -      Follows Commands/Answers Questions 100% of the time  -      Personal Safety Interventions fall  prevention program maintained;gait belt;nonskid shoes/slippers when out of bed;supervised activity  -LH      Recorded by [] Hollie Rosado, PT      Bed Mobility, Assessment/Treatment    Bed Mobility, Comment in chair  -LH      Recorded by [] Hollie Rosado PT      Transfer Assessment/Treatment    Transfers, Sit-Stand [place] stand by assist  -      Transfers, Stand-Sit [place] stand by assist  -      Transfers, Sit-Stand-Sit, Assist Device rolling walker  -      Recorded by [] Hollie Rosado, PT      Gait Assessment/Treatment    Gait, [place] Level stand by assist  -      Gait, Assistive Device rolling walker  -      Gait, Distance (Feet) 200  -LH      Gait, Gait Pattern Analysis swing-through gait  -      Gait, Gait Deviations jeremi decreased;forward flexed posture;bilateral:;decreased heel strike  -      Recorded by [] Hollie Rosado, PT      Therapy Exercises    Exercise Protocols total knee  -      Total Knee Exercises right:;10 reps;ankle pumps/circles;LAQ  -LH      Recorded by [] Hollie Rosado, PT      Positioning and Restraints    Pre-Treatment Position sitting in chair/recliner  -      Post Treatment Position chair  -LH      In Chair reclined;call light within reach;encouraged to call for assist  -LH      Recorded by [] Hollie Rosado, PT        User Key  (r) = Recorded By, (t) = Taken By, (c) = Cosigned By    Initials Name Effective Dates     Hollie Rosado, PT 02/07/17 -                 IP PT Goals       01/17/18 1459          Bed Mobility PT LTG    Bed Mobility PT LTG, Date Established 01/17/18  -KH      Bed Mobility PT LTG, Time to Achieve 1 wk  -KH      Bed Mobility PT LTG, Activity Type all bed mobility  -KH      Bed Mobility PT LTG, [place] Level independent  -KH      Bed Mobility PT Goal  LTG, Assist Device bed rails  -      Transfer Training 2 PT LTG    Transfer Training PT 2 LTG, Date Established 01/17/18  -      Transfer Training PT 2 LTG, Time to  Achieve 1 wk  -KH      Transfer Training PT 2 LTG, Activity Type all transfers  -KH      Transfer Training PT 2 LTG, [place] Level conditional independence  -KH      Transfer Training PT 2 LTG, Assist Device walker, rolling  -KH      Gait Training PT LTG    Gait Training Goal PT LTG, Date Established 01/17/18  -KH      Gait Training Goal PT LTG, Time to Achieve 1 wk  -KH      Gait Training Goal PT LTG, [place] Level conditional independence  -KH      Gait Training Goal PT LTG, Assist Device walker, rolling  -KH      Gait Training Goal PT LTG, Distance to Achieve 150  -KH        User Key  (r) = Recorded By, (t) = Taken By, (c) = Cosigned By    Initials Name Provider Type     Marleen Espinoza, PT Physical Therapist          Physical Therapy Education     Title: PT OT SLP Therapies (Active)     Topic: Physical Therapy (Done)     Point: Mobility training (Done)    Learning Progress Summary    Learner Readiness Method Response Comment Documented by Status   Patient Acceptance E VU,NR   01/18/18 1623 Done    Acceptance E [facility] 01/17/18 1459 Done               Point: Home exercise program (Done)    Learning Progress Summary    Learner Readiness Method Response Comment Documented by Status   Patient Acceptance E VU,NR   01/18/18 1623 Done    Acceptance E VU   01/17/18 1459 Done               Point: Body mechanics (Done)    Learning Progress Summary    Learner Readiness Method Response Comment Documented by Status   Patient Acceptance E VU,NR   01/18/18 1623 Done    Acceptance E VU   01/17/18 1459 Done               Point: Precautions (Done)    Learning Progress Summary    Learner Readiness Method Response Comment Documented by Status   Patient Acceptance E VU,NR   01/18/18 1623 Done    Acceptance E [facility] 01/17/18 1459 Done                      User Key     Initials Effective Dates Name Provider Type Discipline     02/07/17 -  Hollie Rosado, PT Physical Therapist PT     06/22/16 -  Marleen Espinoza, PT  Physical Therapist PT                    PT Recommendation and Plan  Anticipated Equipment Needs At Discharge: front wheeled walker  Anticipated Discharge Disposition: inpatient rehabilitation facility, skilled nursing facility  Planned Therapy Interventions: bed mobility training, balance training, gait training, home exercise program, strengthening, stair training, transfer training, postural re-education, patient/family education  PT Frequency: daily  Plan of Care Review  Plan Of Care Reviewed With: patient  Outcome Summary/Follow up Plan: good tolerance to inc distance in gait, performed therex. no c/o pain. pt hopeful for rehab tmrw - to progress independence and endurance/strengthening w.functional mobility.           Outcome Measures       01/18/18 1600 01/17/18 1500       How much help from another person do you currently need...    Turning from your back to your side while in flat bed without using bedrails? 3  - 3  -KH     Moving from lying on back to sitting on the side of a flat bed without bedrails? 3  - 3  -KH     Moving to and from a bed to a chair (including a wheelchair)? 3  - 3  -KH     Standing up from a chair using your arms (e.g., wheelchair, bedside chair)? 3  - 3  -KH     Climbing 3-5 steps with a railing? 3  - 3  -KH     To walk in hospital room? 3  - 3  -KH     AM-[facility] 6 Clicks Score 18  - 18  -KH     Functional Assessment    Outcome Measure Options AM-PAC 6 Clicks Basic Mobility (PT)  - AM-[facility] 6 Clicks Basic Mobility (PT)  -       User Key  (r) = Recorded By, (t) = Taken By, (c) = Cosigned By    Initials Name Provider Type     Hollie Rosado, PT Physical Therapist    ISIDRO Espinoza, PT Physical Therapist           Time Calculation:         PT Charges       01/18/18 1625          Time Calculation    Start Time 1611  -      Stop Time 1622  -      Time Calculation (min) 11 min  -      PT Received On 01/18/18  -      PT - Next Appointment 01/19/18  -        User Key   (r) = Recorded By, (t) = Taken By, (c) = Cosigned By    Initials Name Provider Type     Hollie Rosado, PT Physical Therapist          Therapy Charges for Today     Code Description Service Date Service Provider Modifiers Qty    90857777922 HC PT THER PROC EA 15 MIN 1/18/2018 Hollie Rosado, PT GP 1          PT G-Codes  Outcome Measure Options: AM-PAC 6 Clicks Basic Mobility (PT)    Hollie Rosado, PT  1/18/2018

## 2018-01-18 NOTE — PROGRESS NOTES
Continued Stay Note  [facility]     Patient Name: Caitlyn Longoria  MRN: [id number]  Today's Date: 1/18/2018    Admit Date: 1/14/2018          Discharge Plan       01/18/18 1312    Case Management/Social Work Plan    Plan [facility], bed available.      Additional Comments  [facility] bed available today or tomorrow 1/19.               Discharge Codes     None        Expected Discharge Date and Time     Expected Discharge Date Expected Discharge Time    Jan 18, 2018             Marcy Lopez RN

## 2018-01-18 NOTE — PLAN OF CARE
Problem: Patient Care Overview (Adult)  Goal: Plan of Care Review   01/18/18 1325   Coping/Psychosocial Response Interventions   Plan Of Care Reviewed With patient   Outcome Evaluation   Outcome Summary/Follow up Plan good tolerance to inc distance in gait, performed therex. no c/o pain. pt hopeful for rehab tmrw - to progress independence and endurance/strengthening w.functional mobility.

## 2018-01-18 NOTE — PLAN OF CARE
Problem: Patient Care Overview (Adult)  Goal: Plan of Care Review  Outcome: Ongoing (interventions implemented as appropriate)   01/18/18 0257   Patient Care Overview   Progress progress toward functional goals as expected   Coping/Psychosocial Response Interventions   Plan Of Care Reviewed With patient   Outcome Evaluation   Outcome Summary/Follow up Plan Pt admitted a few days ago for a washout of the rt knee. Pt is currently here for IV antibiotics. Attempted to put in a PICC during the day, but was unsuccessful. Pt will possibly go off the floor to have the PICC placed for long term use of antibiotics. Pt educated on importance of monitoring blood sugars related to comorbidity of DM. Pt voiced undestanding. Pt is possibly going to rehab in AM once the PICC line is figured out. Will continue to monitor.     Goal: Adult Individualization and Mutuality  Outcome: Ongoing (interventions implemented as appropriate)    Goal: Discharge Needs Assessment  Outcome: Ongoing (interventions implemented as appropriate)      Problem: Mobility, Physical Impaired (Adult)  Goal: Enhanced Functionality Ability  Outcome: Ongoing (interventions implemented as appropriate)      Problem: Infection, Risk/Actual (Adult)  Goal: Infection Prevention/Resolution  Outcome: Ongoing (interventions implemented as appropriate)      Problem: Fall Risk (Adult)  Goal: Absence of Falls  Outcome: Outcome(s) achieved Date Met: 01/18/18

## 2018-01-18 NOTE — PROGRESS NOTES
Name: Caitlyn Longoria ADMIT: 2018   : 1934  PCP: Zenaida Suarez MD    MRN: [id number] LOS: 4 days   AGE/SEX: 83 y.o. female  ROOM: [facility]     Subjective   Subjective:  Symptoms:  No shortness of breath or chest pain.    Diet:  Adequate intake.  No nausea.    Pain:  She reports no pain.    Knee pain better. Denies any other complaints. Able to ambulate.    Objective   Vital Signs  Temp:  [96.8 °F (36 °C)-99.4 °F (37.4 °C)] 97 °F (36.1 °C)  Heart Rate:  [79-81] 81  Resp:  [16] 16  BP: (118-135)/(73-80) 128/77  SpO2:  [92 %-98 %] 93 %  on   ;   O2 Device: room air  Body mass index is 34.33 kg/(m^2).    Physical Exam   Constitutional: She is oriented to person, place, and time. She appears well-developed. She is cooperative. No distress.   Neck: No JVD present. No tracheal deviation present. No thyromegaly present.   Cardiovascular: Normal rate and regular rhythm.    Murmur heard.  Pulmonary/Chest: Effort normal and breath sounds normal. No respiratory distress.   Abdominal: Soft. Normal appearance and bowel sounds are normal. She exhibits no distension. There is no tenderness.   Musculoskeletal: She exhibits edema.   The right knee is mildly warm to the touch.  She has edema surrounding the joint and pain with range of motion.    Neurological: She is alert and oriented to person, place, and time.   Skin: Skin is warm and dry. No rash noted.   Psychiatric: She has a normal mood and affect. Her behavior is normal.   Nursing note and vitals reviewed.      Results Review:       I reviewed the patient's new clinical results.    Results from last 7 days  Lab Units 18  0425 18  0438 18  0345 01/15/18  0423 18  1338   WBC 10*3/mm3 5.80 7.64 6.33 6.17 7.35   HEMOGLOBIN g/dL 11.1* 11.4* 10.4* 10.9* 12.1   PLATELETS 10*3/mm3 162 181 170 162 185       Results from last 7 days  Lab Units 18  0425 18  0438 18  0345 01/15/18  0423 18  1338   SODIUM mmol/L   --   --  143 146* 140   POTASSIUM mmol/L  --   --  3.8 3.7 3.9   CHLORIDE mmol/L  --   --  104 104 99   CO2 mmol/L  --   --  25.5 29.1* 27.1   BUN mg/dL  --   --  25* 23 26*   CREATININE mg/dL 0.99 1.17* 1.11* 1.03* 1.02*   GLUCOSE mg/dL  --   --  139* 122* 122*   ALBUMIN g/dL  --   --   --   --  3.70   BILIRUBIN mg/dL  --   --   --   --  0.9   ALK PHOS U/L  --   --   --   --  89   AST (SGOT) U/L  --   --   --   --  20   ALT (SGPT) U/L  --   --   --   --  18   Estimated Creatinine Clearance: 47 mL/min (by C-G formula based on Cr of 0.99).    Results from last 7 days  Lab Units 01/16/18  0345 01/15/18  0423 01/14/18  1338   CALCIUM mg/dL 8.2* 8.2* 8.8   ALBUMIN g/dL  --   --  3.70         Results from last 7 days  Lab Units 01/16/18  0345 01/14/18  1338   LACTATE mmol/L  --  1.5   SED RATE mm/hr  --  34*   CRP mg/dL 1.38* 1.99*       Results from last 7 days  Lab Units 01/15/18  1404 01/14/18  1411 01/14/18  1338   BLOODCX   --  No growth at 3 days No growth at 3 days   BODYFLDCX  No growth at 3 days  --   --    GRAM STAIN RESULT  No organisms seen  Rare (1+) WBCs per low power field  --   --        Results from last 7 days  Lab Units 01/18/18  0425 01/17/18  0438 01/16/18  0345 01/15/18  0423 01/14/18  1338   PROTIME Seconds 19.3* 17.9* 20.6* 25.7* 24.7*   INR  1.69* 1.53* 1.84* 2.44* 2.32*     Glucose   Date/Time Value Ref Range Status   01/18/2018 0617 113 70 - 130 mg/dL Final   01/17/2018 2148 149 (H) 70 - 130 mg/dL Final   01/17/2018 1726 125 70 - 130 mg/dL Final   01/17/2018 1105 214 (H) 70 - 130 mg/dL Final   01/17/2018 0632 123 70 - 130 mg/dL Final   01/16/2018 2105 157 (H) 70 - 130 mg/dL Final   01/16/2018 1600 121 70 - 130 mg/dL Final   01/16/2018 1130 160 (H) 70 - 130 mg/dL Final         amitriptyline 25 mg Oral Nightly   aspirin 81 mg Oral Daily   carvedilol 3.125 mg Oral Q12H   ceFAZolin 2 g Intravenous Q8H   enoxaparin 1 mg/kg Subcutaneous Once   famotidine 40 mg Oral Nightly   fluticasone 2 spray Each  Nare Daily   insulin aspart 0-7 Units Subcutaneous 4x Daily With Meals & Nightly   loperamide 2 mg Oral Daily   oxazepam 10 mg Oral Nightly   oxybutynin XL 10 mg Oral Daily   pantoprazole 40 mg Oral BID With Meals   rosuvastatin 20 mg Oral Daily   saccharomyces boulardii 250 mg Oral BID   warfarin 7.5 mg Oral Once      Diet Regular; Consistent Carbohydrate      Assessment/Plan   Assessment:     Active Hospital Problems (** Indicates Principal Problem)    Diagnosis Date Noted   • **Infection of prosthetic right knee joint [T84.53XA] 01/17/2018   • Chronic combined systolic and diastolic congestive heart failure [I50.42] 01/15/2018   • HTN (hypertension) [I10] 01/14/2018   • CKD (chronic kidney disease) stage 3, GFR 30-59 ml/min [N18.3] 01/14/2018   • DM type 2 (diabetes mellitus, type 2) [E11.9] 12/07/2017   • Atrial fibrillation [I48.91] 12/07/2017   • CAD (coronary artery disease), hx of stent [I25.10] 12/07/2017      Resolved Hospital Problems    Diagnosis Date Noted Date Resolved   • Hypernatremia [E87.0] 01/15/2018 01/16/2018       · Continue to hold losartan. Can resume at dc.  · On empiric Kefzol for septic arthritis. ID has recommended 6 weeks of IV treatment followed by PO suppression therapy.  · PICC was placed this afternoon.   · INR low. Managed by Dr. Veliz.   · Patient had 4 loose stools this morning. C. difficile toxin was ordered. Diarrhea seems better this afternoon. Given antibiotic exposure will rule out C. difficile.  · Plan discharge to [place] with C. difficile negative. Bed available 1/19 per CCP.      Shamir Wilkins MD  [facility]  01/18/18  7:29 AM

## 2018-01-18 NOTE — PLAN OF CARE
Problem: Patient Care Overview (Adult)  Goal: Plan of Care Review  Outcome: Ongoing (interventions implemented as appropriate)   01/18/18 1802   Patient Care Overview   Progress improving   Coping/Psychosocial Response Interventions   Plan Of Care Reviewed With patient   Outcome Evaluation   Outcome Summary/Follow up Plan Pain controlled refuses need for medications. C/O diarrhea today orders received for stool for c diff. PICC placed today. noted with chanda amt of bleeding under dresssing. Pt remains concerned about the bleeding. IV team assessed and noted no problem. Plans d/c to rehab tomorrow.     Goal: Adult Individualization and Mutuality  Outcome: Ongoing (interventions implemented as appropriate)    Goal: Discharge Needs Assessment  Outcome: Ongoing (interventions implemented as appropriate)      Problem: Mobility, Physical Impaired (Adult)  Goal: Enhanced Mobility Skills  Outcome: Outcome(s) achieved Date Met: 01/18/18    Goal: Enhanced Functionality Ability  Outcome: Ongoing (interventions implemented as appropriate)      Problem: Infection, Risk/Actual (Adult)  Goal: Infection Prevention/Resolution  Outcome: Ongoing (interventions implemented as appropriate)

## 2018-01-18 NOTE — PROGRESS NOTES
"CC: CAD/PAF    Interval History:   No complaints up with walker    Vital Signs  Temp:  [96.8 °F (36 °C)-99.4 °F (37.4 °C)] 97 °F (36.1 °C)  Heart Rate:  [79-85] 81  Resp:  [16] 16  BP: (118-135)/(73-92) 128/77    Intake/Output Summary (Last 24 hours) at 01/18/18 0708  Last data filed at 01/17/18 1755   Gross per 24 hour   Intake              840 ml   Output                0 ml   Net              840 ml     Flowsheet Rows         First Filed Value    Admission Height  162.6 cm (64\") Documented at 01/14/2018 1252    Admission Weight  90.7 kg (200 lb) Documented at 01/14/2018 1252          PHYSICAL EXAM:  General: No acute distress  Resp:NL Rate, unlabored, clear  CV:NL rate and rhythm, NL PMI, Nl S1 and S2, 2/6 systolic Mumur, no gallop, no rub, No JVD. Normal pedal pulses  ABD:Nl sounds, no masses or tenderness, nondistended, no quarding or rebound  Neuro: alert,cooperative and oriented  Extr: No edema or cyanosis, moves all extremities      Results Review:      Results from last 7 days  Lab Units 01/18/18 0425 01/16/18  0345   SODIUM mmol/L  --   --  143   POTASSIUM mmol/L  --   --  3.8   CHLORIDE mmol/L  --   --  104   CO2 mmol/L  --   --  25.5   BUN mg/dL  --   --  25*   CREATININE mg/dL 0.99  < > 1.11*   GLUCOSE mg/dL  --   --  139*   CALCIUM mg/dL  --   --  8.2*   < > = values in this interval not displayed.        Results from last 7 days  Lab Units 01/18/18  0425   WBC 10*3/mm3 5.80   HEMOGLOBIN g/dL 11.1*   HEMATOCRIT % 35.7   PLATELETS 10*3/mm3 162       Results from last 7 days  Lab Units 01/18/18  0425 01/17/18  0438 01/16/18  0345   INR  1.69* 1.53* 1.84*                 @LABRCNT(bnp)@  I reviewed the patient's new clinical results.  I personally viewed and interpreted the patient's EKG/Telemetry data        Medication Review:   Meds reviewed         Assessment/Plan   1.  Possible septic right knee. Plan is to treat with IV antibiotics.    2.  History of coronary artery disease with previous occlusion " of the mid left anterior descending stenting of the first diagonal with a drug-eluting stent catheterization October 2012 restenosed the diagonal vessel with repeat drug-eluting stent placement of that left ventricular ejection fraction 50%.  Echocardiogram showed left ventricular ejection fraction of 23%.  3.  Ischemic cardio myopathy as above.  4.  Paroxysmal atrial fibrillation/sick sinus and now status post permanent pacemaker implantation status post atrial flutter ablation status post RV lead revision chads 2 vascular score 6 high risk for embolic event.  INR still only 1.5 we'll give a dose of Lovenox again today and a larger dose of warfarin.  Would like to get her INR above 2.  5.  Chronic dyspnea has underlying heart and lung disease as well as maybe a paralyzed left hemidiaphragm.  6.  History of diabetes mellitus.  7.  History of hypertension follow blood pressure  8.  History of hyperlipidemia intolerant of multiple statins.  9.  History of obstructive sleep apnea and pulmonary hypertension.   10.  Recent cerebral vascular event.  May have come from ulcerated plaque in the left common carotid artery ever could've been embolic from her heart.  We'll restart aspirin.    We will see when necessary.  She should see her nurse practitioner in a month we'll see me in 3-4 months.      Rommel Veliz MD  01/18/18  7:08 AM

## 2018-01-18 NOTE — PROGRESS NOTES
Orthopedic Progress Note      Patient: Caitlyn Longoria    YOB: 1934    Medical Record Number: [id number]    Attending Physician: Shamir Wilkins MD    Date of Admission: 1/14/2018 12:53 PM    Admitting Dx:  Knee effusion, right [M25.461]  Infection of right knee [M00.9]    Current Problem List:   Patient Active Problem List   Diagnosis   • Neck and shoulder pain   • Arthropathy of shoulder region   • Carpal tunnel syndrome of left wrist   • Chronic pain of both shoulders   • Chronic left shoulder pain   • Arthropathy of left shoulder   • Dysarthria   • DM type 2 (diabetes mellitus, type 2)   • Atrial fibrillation   • HLD (hyperlipidemia)   • Bronchitis   • CAD (coronary artery disease), hx of stent   • CVA (cerebral vascular accident)   • HTN (hypertension)   • CKD (chronic kidney disease) stage 3, GFR 30-59 ml/min   • Chronic combined systolic and diastolic congestive heart failure   • Infection of prosthetic right knee joint         Past Medical History:   Diagnosis Date   • Abdominal pain    • Cough    • Diarrhea    • Difficulty breathing    • Dizziness    • DM type 2 (diabetes mellitus, type 2)        SUBJECTIVE: 83 y.o.  female. Awake and alert.  Complaints of diarrhea today.      OBJECTIVE:   Vitals:    01/17/18 1931 01/17/18 2303 01/18/18 0402 01/18/18 0728   BP: 128/78 134/80 128/77 139/86   BP Location: Right arm Left arm Left arm Left arm   Patient Position: Sitting Lying Lying Lying   Pulse: 81 80 81 81   Resp: 16 16 16 18   Temp: 99.4 °F (37.4 °C) 96.8 °F (36 °C) 97 °F (36.1 °C) 96.5 °F (35.8 °C)   TempSrc: Oral Oral Oral Oral   SpO2: 98% 95% 93% 99%   Weight:       Height:         I/O last 3 completed shifts:  In: 840 [P.O.:840]  Out: -     PHYSICAL EXAM:    Current Medications:  Scheduled Meds:  amitriptyline 25 mg Oral Nightly   aspirin 81 mg Oral Daily   carvedilol 3.125 mg Oral Q12H   ceFAZolin 2 g Intravenous Q8H   famotidine 40 mg Oral Nightly   fluticasone 2 spray Each Nare  Daily   insulin aspart 0-7 Units Subcutaneous 4x Daily With Meals & Nightly   loperamide 2 mg Oral Daily   oxazepam 10 mg Oral Nightly   oxybutynin XL 10 mg Oral Daily   pantoprazole 40 mg Oral BID With Meals   rosuvastatin 20 mg Oral Daily   saccharomyces boulardii 250 mg Oral BID   warfarin 7.5 mg Oral Once     PRN Meds:.•  acetaminophen  •  dextrose  •  dextrose  •  glucagon (human recombinant)  •  Morphine **AND** naloxone  •  ondansetron **OR** ondansetron ODT **OR** ondansetron    Diagnostic Tests:   Lab Results (last 24 hours)     Procedure Component Value Units Date/Time    POC Glucose Once [[id number]]  (Abnormal) Collected:  01/17/18 1105    Specimen:  Blood Updated:  01/17/18 1116     Glucose 214 (H) mg/dL     Narrative:       Meter: [id number] : [id number] Brown Celselie NA    Blood Culture - Blood, [[id number]]  (Normal) Collected:  01/14/18 1338    Specimen:  Blood from Arm, Left Updated:  01/17/18 1401     Blood Culture No growth at 3 days    Blood Culture - Blood, [[id number]]  (Normal) Collected:  01/14/18 1411    Specimen:  Blood from Arm, Left Updated:  01/17/18 1501     Blood Culture No growth at 3 days    POC Glucose Once [[id number]]  (Normal) Collected:  01/17/18 1726    Specimen:  Blood Updated:  01/17/18 1729     Glucose 125 mg/dL     Narrative:       Meter: [id number] : [id number] Brown Celselie NA    POC Glucose Once [[id number]]  (Abnormal) Collected:  01/17/18 2148    Specimen:  Blood Updated:  01/17/18 2152     Glucose 149 (H) mg/dL     Narrative:       Meter: [id number] : [id number] Neto Jackson    CBC (No Diff) [[id number]]  (Abnormal) Collected:  01/18/18 0425    Specimen:  Blood Updated:  01/18/18 0511     WBC 5.80 10*3/mm3      RBC 3.44 (L) 10*6/mm3      Hemoglobin 11.1 (L) g/dL      Hematocrit 35.7 %      .8 (H) fL      MCH 32.3 (H) pg      MCHC 31.1 (L) g/dL      RDW 15.5 (H) %      RDW-SD 58.2 (H) fl      MPV 9.7 fL      Platelets 162 10*3/mm3     Protime-INR  [[id number]]  (Abnormal) Collected:  01/18/18 0425    Specimen:  Blood Updated:  01/18/18 0519     Protime 19.3 (H) Seconds      INR 1.69 (H)    Creatinine, Serum [[id number]]  (Abnormal) Collected:  01/18/18 0425    Specimen:  Blood Updated:  01/18/18 0538     Creatinine 0.99 mg/dL      eGFR Non African Amer 54 (L) mL/min/1.73     Narrative:       The MDRD GFR formula is only valid for adults with stable renal function between ages 18 and 70.    POC Glucose Once [[id number]]  (Normal) Collected:  01/18/18 0617    Specimen:  Blood Updated:  01/18/18 0626     Glucose 113 mg/dL     Narrative:       Meter: [id number] : [id number] Neto Jackson    Body Fluid Culture - Synovial Fluid, Knee, Right [[id number]]  (Normal) Collected:  01/15/18 1404    Specimen:  Synovial Fluid from Knee, Right Updated:  01/18/18 0704     BF Culture No growth at 3 days     Gram Stain Result No organisms seen      Rare (1+) WBCs per low power field           ASSESSMENT & PLAN:  Right knee feeling better.  Minimal swelling and slight warmth to right knee.  No erythema.  Denies any pain.  Has 0-110 degree AROM.  Did ambulate with PT yesterday.  3 attempts made to insert PICC line.  Planning to have it inserted in XR under fluoroscopy.  To rehab anytime from Ortho standpoint.          Date: 1/18/2018    JEANNINE Pierson MD

## 2018-01-19 VITALS
WEIGHT: 229.8 LBS | DIASTOLIC BLOOD PRESSURE: 78 MMHG | OXYGEN SATURATION: 95 % | TEMPERATURE: 97.1 F | RESPIRATION RATE: 16 BRPM | SYSTOLIC BLOOD PRESSURE: 129 MMHG | HEIGHT: 64 IN | HEART RATE: 79 BPM | BODY MASS INDEX: 39.23 KG/M2

## 2018-01-19 LAB
BACTERIA SPEC AEROBE CULT: NORMAL
BACTERIA SPEC AEROBE CULT: NORMAL
GLUCOSE BLDC GLUCOMTR-MCNC: 126 MG/DL (ref 70–130)
GLUCOSE BLDC GLUCOMTR-MCNC: 196 MG/DL (ref 70–130)
INR PPP: 2.29 (ref 0.9–1.1)
PROTHROMBIN TIME: 24.4 SECONDS (ref 11.7–14.2)

## 2018-01-19 PROCEDURE — 82962 GLUCOSE BLOOD TEST: CPT

## 2018-01-19 PROCEDURE — 25010000002 CEFAZOLIN PER 500 MG: Performed by: INTERNAL MEDICINE

## 2018-01-19 PROCEDURE — 85610 PROTHROMBIN TIME: CPT | Performed by: INTERNAL MEDICINE

## 2018-01-19 PROCEDURE — 63710000001 INSULIN ASPART PER 5 UNITS: Performed by: INTERNAL MEDICINE

## 2018-01-19 RX ORDER — OXAZEPAM 10 MG
10 CAPSULE ORAL NIGHTLY
Qty: 5 CAPSULE | Refills: 0 | Status: SHIPPED | OUTPATIENT
Start: 2018-01-19 | End: 2020-12-23 | Stop reason: ALTCHOICE

## 2018-01-19 RX ADMIN — ASPIRIN 81 MG: 81 TABLET ORAL at 08:53

## 2018-01-19 RX ADMIN — LOPERAMIDE HYDROCHLORIDE 2 MG: 2 CAPSULE ORAL at 08:52

## 2018-01-19 RX ADMIN — CARVEDILOL 3.12 MG: 3.12 TABLET, FILM COATED ORAL at 08:52

## 2018-01-19 RX ADMIN — PANTOPRAZOLE SODIUM 40 MG: 40 TABLET, DELAYED RELEASE ORAL at 08:59

## 2018-01-19 RX ADMIN — ACETAMINOPHEN 650 MG: 325 TABLET ORAL at 13:21

## 2018-01-19 RX ADMIN — CEFAZOLIN 2 G: 10 INJECTION, POWDER, FOR SOLUTION INTRAVENOUS at 13:21

## 2018-01-19 RX ADMIN — Medication 10 ML: at 08:59

## 2018-01-19 RX ADMIN — INSULIN ASPART 2 UNITS: 100 INJECTION, SOLUTION INTRAVENOUS; SUBCUTANEOUS at 13:22

## 2018-01-19 RX ADMIN — ROSUVASTATIN CALCIUM 20 MG: 20 TABLET, FILM COATED ORAL at 08:53

## 2018-01-19 RX ADMIN — CEFAZOLIN 2 G: 10 INJECTION, POWDER, FOR SOLUTION INTRAVENOUS at 05:20

## 2018-01-19 RX ADMIN — FLUTICASONE PROPIONATE 2 SPRAY: 50 SPRAY, METERED NASAL at 08:53

## 2018-01-19 RX ADMIN — Medication 250 MG: at 08:52

## 2018-01-19 NOTE — PROGRESS NOTES
Case Management Discharge Note    Final Note: d/c to [facility]. Latonya/Liz notfied. Pt's family notified.     Discharge Placement     Facility/Agency Request Status Selected? Address Phone Number Fax Number    [facility] [place] Accepted    [facility] [address] [facility] [id number] [phone]    [facility] Accepted     [address] [facility] [address] [address] [phone]    [facility] Pending - Request Sent     [address] [facility] [id number] [phone]    [facility]     [address] [facility] [address] [phone]    [facility] Pending - Request Sent     [address] [facility] [address] [phone]    [facility] Declined     [address] [facility] [address] [facility] [address] [phone]        [facility]: [facility]    Discharge Codes: 03  Discharged/transferred to skilled nursing facility (SNF) with Medicare certification in anticipation of skilled care

## 2018-01-19 NOTE — DISCHARGE SUMMARY
Date of Admission: 1/14/2018  Date of Discharge:  1/19/2018    PCP: Zenaida Suarez MD      DISCHARGE DIAGNOSIS  Principal Problem:    Infection of prosthetic right knee joint  Active Problems:    DM type 2 (diabetes mellitus, type 2)    Atrial fibrillation    CAD (coronary artery disease), hx of stent    HTN (hypertension)    CKD (chronic kidney disease) stage 3, GFR 30-59 ml/min    Chronic combined systolic and diastolic congestive heart failure      SECONDARY DIAGNOSES  Past Medical History:   Diagnosis Date   • Abdominal pain    • Cough    • Diarrhea    • Difficulty breathing    • Dizziness    • DM type 2 (diabetes mellitus, type 2)        CONSULTS   Consults     Date and Time Order Name Status Description    1/16/2018 0747 Inpatient Consult to Infectious Diseases Completed     1/14/2018 1632 Inpatient Consult to Neurology Completed     1/14/2018 1632 Inpatient Consult to Cardiology      1/14/2018 1632 Inpatient Consult to Orthopedic Surgery Completed     1/14/2018 1512 [facility] (on-call MD unless specified) Completed     1/14/2018 1429 Ortho (on-call MD unless specified) Completed           PROCEDURES PERFORMED  PICC W FLUORO GUIDANCE   Preliminary Result   Satisfactory PICC line placement to the superior vena cava   without difficulty. No postprocedure complication.       FLUOROSCOPY TIME: 58 seconds, 2 images.          US Guided Vascular Access   Preliminary Result   Satisfactory PICC line placement to the superior vena cava   without difficulty. No postprocedure complication.       FLUOROSCOPY TIME: 58 seconds, 2 images.          XR Chest PA & Lateral   Final Result      XR Knee 1 or 2 View Right   Final Result            HOSPITAL COURSE  Patient is a 83 y.o. female presented to [facility] complaining of Right knee pain and swelling.  Please see the admitting history and physical for further details. The right knee pain, swelling, redness, and warmth that has been ongoing for the  past 4 days.  She has been in contact with her orthopedic surgeon and was going to be seen in the office 2 days ago but could not make it due to the inclement weather.  Her knee was feeling worse and she came on into the emergency room today to be seen.  She states that the swelling redness and warmth are somewhat improved today but her pain is worse.  She denies any fevers or chills.  No other associated symptoms and states she is otherwise feeling well.  She states that she initially had right total knee arthroplasty in 2002.  In 2014 she had septic arthritis of this knee requiring washout.  She was admitted to the hospital for possible septic arthritis.  Orthopedic surgery was consulted and she was started on broad-spectrum antibiotics for coverage.  From a medical standpoint she's extremely complex.  She has multiple underlying comorbidities including coronary artery disease, heart failure,  Hypertension, chronic kidney disease and paroxysmal atrial fibrillation.  Further complicating matters is about a month ago she had an acute event with a stroke and was noted to have an ulcerated plaque with one of her carotid arteries.  She had a joint aspiration done at the bedside with relatively clear fluid noted with no obvious infectious signs on bedside appearance.  This was sent off for cell count and sensitivity.  Neurology and cardiology were also consulted for assistance mainly in preparing for the fact that she may require operative intervention and would require a full risk assessment prior to undergoing any surgical procedures.  Fluid studies from the arthrocentesis were consistent with underlying prosthetic joint infection.  Infectious disease recommendations were for operative debridement followed by 6 weeks of IV antibiotics.  However, given her significant medical issues at baseline she was felt to have too high of operative risk for washout and removal of hardware.  Infectious disease recommended in 6  "weeks of IV antibiotics with transition to suppressive therapy after completion.  A PICC line has been placed and she is tolerating IV antibiotics well.  She was bridged to therapeutic INR prior to discharge her INR at the time of discharge is 2.2.  We'll continue Coumadin at her home dose.  Recommend repeating the INR next week.  She'll require repeat labs weekly to evaluate her course of care.  These will need to be faxed to the infectious disease doctor.  She'll need a follow-up with the infectious disease doctor at 6 weeks after discharge.  At that point they can assess for completion of treatment and further care.  She'll need to follow-up with Dr. Hunter around the same time.  Otherwise she can follow-up with her cardiologist in about 3-4 months and follow-up with the nurse practitioner in the cardiologist's office after she is released from rehabilitation.  At the time of discharge she's hemodynamic stable feeling much better and really eager to be discharged from the hospital.  All questions were addressed at the bedside and she is agreeable with discharged today.      CONDITION ON DISCHARGE  Stable.      VITAL SIGNS  /78 (BP Location: Right arm, Patient Position: Lying)  Pulse 79  Temp 97.1 °F (36.2 °C) (Oral)   Resp 16  Ht 162.6 cm (64\")  Wt 104 kg (229 lb 12.8 oz)  SpO2 95%  BMI 39.45 kg/m2  Objective      DISCHARGE DISPOSITION   Skilled Nursing Facility (DC - External)      DISCHARGE MEDICATIONS   Caitlyn Longoria   Home Medication Instructions LINETTE:[id number]    Printed on:01/19/18 0232   Medication Information                      acetaminophen (TYLENOL) 500 MG tablet  Take 500 mg by mouth Every 6 (Six) Hours As Needed for Mild Pain .             Acetylcysteine (N-ACETYL-L-CYSTEINE) 600 MG capsule  Take 600 mg by mouth Daily With Dinner.             amitriptyline (ELAVIL) 25 MG tablet  Take 25 mg by mouth Every Night.             aspirin 81 MG chewable tablet  Chew 81 mg Daily.           "   azelastine (ASTEPRO) 0.15 % solution nasal spray  2 sprays into each nostril 2 (Two) Times a Day.             azelastine (OPTIVAR) 0.05 % ophthalmic solution  Administer 1 drop to both eyes 2 (Two) Times a Day.             baclofen (LIORESAL) 10 MG tablet  Take 10 mg by mouth 3 (Three) Times a Day As Needed.             Biotin 2.5 MG capsule  Take 1 capsule by mouth Daily.             bumetanide (BUMEX) 1 MG tablet  Take 1 mg by mouth Every Morning.             Calcium Carb-Cholecalciferol (CALCIUM-VITAMIN D) 500-200 MG-UNIT per tablet  Take 1 tablet by mouth 2 (Two) Times a Day.             carvedilol (COREG) 3.125 MG tablet  Take 1 tablet by mouth Every 12 (Twelve) Hours for 30 days.             CeFAZolin Sodium (CEFAZOLIN IN SWFI 2 GM/20ML IV PUSH SYRINGE, SIMPLE,)  Infuse 20 mL into a venous catheter Every 8 (Eight) Hours for 115 doses. Indications: Bone and/or Joint Infection             cetirizine (ZyrTEC) 10 MG tablet  Take 10 mg by mouth Daily.             ciclopirox (LOPROX) 0.77 % cream  Apply 1 application topically Daily As Needed.             clobetasol (TEMOVATE) 0.05 % external solution  Apply 1 application topically At Night As Needed.             clotrimazole-betamethasone (LOTRISONE) 1-0.05 % cream  Apply 1 application topically 2 (Two) Times a Day As Needed.             dry mouth gel (BIOTENE ORALBALANCE) gel  Apply  to the mouth or throat As Needed for Dry Mouth.             flunisolide (NASALIDE) 25 MCG/ACT (0.025%) solution nasal spray  Inhale 2 sprays Every 12 (Twelve) Hours.             glipiZIDE (GLUCOTROL) 5 MG tablet  Take 5 mg by mouth Every Morning.             ketoconazole (NIZORAL) 2 % shampoo  Apply  topically 2 (Two) Times a Week.             loperamide (IMODIUM) 2 MG capsule  Take 2 mg by mouth Daily.             Melatonin 3 MG tablet  Take 3 mg by mouth Every Night.             Omega-3 Fatty Acids (FISH OIL) 1000 MG capsule capsule  Take 1,000 mg by mouth 2 (Two) Times a Day With  Meals.             oxazepam (SERAX) 10 MG capsule  Take 1 capsule by mouth Every Night.             oxybutynin XL (DITROPAN-XL) 10 MG 24 hr tablet  Take 10 mg by mouth Daily.             pantoprazole (PROTONIX) 40 MG EC tablet  Take 40 mg by mouth 2 (Two) Times a Day With Meals.             raNITIdine (ZANTAC) 300 MG tablet  Take 300 mg by mouth Every Night.             rosuvastatin (CRESTOR) 10 MG tablet  Take 20 mg by mouth Daily.             saccharomyces boulardii (FLORASTOR) 250 MG capsule  Take 250 mg by mouth 2 (Two) Times a Day.             warfarin (COUMADIN) 4 MG tablet  Take 4 mg by mouth Daily.               Diet Instructions     Diet: Consistent Carbohydrate, Cardiac; Thin       Discharge Diet:   Consistent Carbohydrate  Cardiac      Fluid Consistency:  Thin              Activity Instructions     Activity as Tolerated                 Future Appointments  Date Time Provider Department Center   1/25/2018 12:00 AM DIEGO GOMEZ [facility] None   2/26/2018 1:10 PM Mehul Martinez MD [facility] None   3/26/2018 11:00 AM KRISTIN Anne [facility] None   10/18/2018 10:20 AM Rommel Veliz MD [facility] None     Additional Instructions for the Follow-ups that You Need to Schedule     Discharge Follow-up with Specified Provider: Cardiology NP; 1 Month    As directed    To:  Cardiology NP    Follow Up:  1 Month           Discharge Follow-up with Specified Provider: Dr martinez; 6 Weeks    As directed    To:  Dr martinez    Follow Up:  6 Weeks    Follow Up Details:  2/26/18           Discharge Follow-up with Specified Provider: Orthopedics; 6 Weeks    As directed    To:  Orthopedics    Follow Up:  6 Weeks           Basic Metabolic Panel    Jan 22, 2018 (Approximate)    Fax to [phone]; attention Dr Martinez   Order Comments:  Fax to [phone]; attention Dr Martinez            CBC & Differential    Jan 22, 2018 (Approximate)    Fax to [phone]; attention Dr Martinez    Order Comments:  Fax to [phone]; attention Dr Miller     Manual Differential:  No           High Sensitivity CRP    Jan 22, 2018 (Approximate)    Fax to [phone]; attention Dr Miller   Order Comments:  Fax to [phone]; attention Dr Miller            Protime-INR    Jan 22, 2018 (Approximate)              Follow-up Information     Follow up with [facility] .    Specialties:  Assisted Living Facility, Skilled Nursing Facility, Intermediate Care Facility    Contact information:    [address]  [facility] [id number] [phone]        Follow up with KRISTIN Garrido Follow up in 1 month(s).    Specialties:  Nurse Practitioner, Cardiology    Contact information:    [address]  [place] [address]  [phone]          Follow up with Rommel Veliz MD Follow up in 4 month(s).    Specialty:  Cardiology    Contact information:    [address]  [place] [address]  [phone]          Follow up with Zenaida Suarez MD .    Specialty:  Internal Medicine    Contact information:    [address]  [address]  [place] [address]  [phone]            TEST  RESULTS PENDING AT DISCHARGE   Order Current Status    Anaerobic Culture - Synovial Fluid, Knee, Right Preliminary result    Blood Culture - Blood, Preliminary result    Blood Culture - Blood, Preliminary result    Body Fluid Culture - Synovial Fluid, Knee, Right Preliminary result             Paco Bansal MD  [facility]  01/19/18  1:49 PM      Time: greater than 30 minutes.

## 2018-01-19 NOTE — DISCHARGE INSTRUCTIONS
- plan for 6 weeks of cefazolin 2 g IV q8h with stop date 2/26/18 followed by oral suppression with cephalexin (targeting prior MSSA and possible Strep)  -weekly CBC w/ diff, Crt, CRP faxed to Dr Miller at [phone]

## 2018-01-19 NOTE — PLAN OF CARE
Problem: Patient Care Overview (Adult)  Goal: Plan of Care Review  Outcome: Ongoing (interventions implemented as appropriate)   01/19/18 0158   Patient Care Overview   Progress progress toward functional goals as expected   Coping/Psychosocial Response Interventions   Plan Of Care Reviewed With patient   Outcome Evaluation   Outcome Summary/Follow up Plan Pt admitted after a previous total knee became infected. Pt is currently on IV antibiotics Q8hrs. Pt had a PICC placed on day shift. Dressing was changed during day shift due to some drainage around the insertion site. Pt dressinfd has some more drainage noted. IV nurse called to take a look at the PICC. As per IV nurse moist drainage is related to increase blood thinners. IV nurse will return to change dressing. PICC is a single lumen with good blood return to the right upper arm. Pt possibly being discharged to [facility] in AM. Will continue to monitor.     Goal: Adult Individualization and Mutuality  Outcome: Ongoing (interventions implemented as appropriate)    Goal: Discharge Needs Assessment  Outcome: Ongoing (interventions implemented as appropriate)      Problem: Mobility, Physical Impaired (Adult)  Goal: Enhanced Functionality Ability  Outcome: Ongoing (interventions implemented as appropriate)      Problem: Infection, Risk/Actual (Adult)  Goal: Infection Prevention/Resolution  Outcome: Ongoing (interventions implemented as appropriate)

## 2018-01-20 LAB — BACTERIA SPEC ANAEROBE CULT: NORMAL

## 2018-01-29 LAB
BACTERIA FLD CULT: NORMAL
GRAM STN SPEC: NORMAL
GRAM STN SPEC: NORMAL

## 2018-02-22 ENCOUNTER — TELEPHONE (OUTPATIENT)
Dept: INFECTIOUS DISEASES | Facility: CLINIC | Age: 83
End: 2018-02-22

## 2018-02-22 NOTE — TELEPHONE ENCOUNTER
Phone with Jovita at [facility]. States patient was concerned because she thought her knee felt a little warm today. There is no redness, no swelling and temp is 97.0. She has an appt on Monday, 2/26/18 with Dr. Miller already scheduled. Advised just watch for any new symptoms and call back if needed. Renetta Viera RN

## 2018-02-24 PROCEDURE — 85610 PROTHROMBIN TIME: CPT

## 2018-02-25 ENCOUNTER — LAB REQUISITION (OUTPATIENT)
Dept: LAB | Facility: HOSPITAL | Age: 83
End: 2018-02-25

## 2018-02-25 DIAGNOSIS — Z00.00 ROUTINE GENERAL MEDICAL EXAMINATION AT A HEALTH CARE FACILITY: ICD-10-CM

## 2018-02-25 LAB
INR PPP: 3.92 (ref 0.9–1.1)
PROTHROMBIN TIME: 37.8 SECONDS (ref 11.7–14.2)

## 2018-02-26 ENCOUNTER — TELEPHONE (OUTPATIENT)
Dept: INFECTIOUS DISEASES | Facility: CLINIC | Age: 83
End: 2018-02-26

## 2018-02-26 ENCOUNTER — OFFICE VISIT (OUTPATIENT)
Dept: INFECTIOUS DISEASES | Facility: CLINIC | Age: 83
End: 2018-02-26

## 2018-02-26 VITALS
DIASTOLIC BLOOD PRESSURE: 76 MMHG | HEART RATE: 83 BPM | WEIGHT: 227 LBS | HEIGHT: 64 IN | TEMPERATURE: 97.6 F | BODY MASS INDEX: 38.76 KG/M2 | SYSTOLIC BLOOD PRESSURE: 117 MMHG

## 2018-02-26 DIAGNOSIS — T84.59XD INFECTION OF PROSTHETIC KNEE JOINT, SUBSEQUENT ENCOUNTER: Primary | ICD-10-CM

## 2018-02-26 DIAGNOSIS — Z96.659 INFECTION OF PROSTHETIC KNEE JOINT, SUBSEQUENT ENCOUNTER: Primary | ICD-10-CM

## 2018-02-26 DIAGNOSIS — Z79.2 LONG TERM (CURRENT) USE OF ANTIBIOTICS: ICD-10-CM

## 2018-02-26 DIAGNOSIS — I48.0 PAROXYSMAL ATRIAL FIBRILLATION (HCC): ICD-10-CM

## 2018-02-26 PROCEDURE — 99214 OFFICE O/P EST MOD 30 MIN: CPT | Performed by: INTERNAL MEDICINE

## 2018-02-26 RX ORDER — CARVEDILOL 3.12 MG/1
3.12 TABLET ORAL 2 TIMES DAILY WITH MEALS
COMMUNITY
End: 2018-03-22

## 2018-02-26 RX ORDER — CEPHALEXIN 500 MG/1
500 CAPSULE ORAL EVERY 8 HOURS
Qty: 90 CAPSULE | Refills: 5 | Status: SHIPPED | OUTPATIENT
Start: 2018-02-26 | End: 2018-03-28

## 2018-02-26 RX ORDER — LOSARTAN POTASSIUM 25 MG/1
25 TABLET ORAL DAILY
COMMUNITY
End: 2018-08-28 | Stop reason: SDUPTHER

## 2018-02-26 NOTE — TELEPHONE ENCOUNTER
Gave VO & faxed orders to Denys PAEZ at [facility] @ [facility] to give pt final 2 doses of cefazolin 2 gm IV on 02/26/18 and then pull picc line on 02/27/18.

## 2018-02-26 NOTE — PROGRESS NOTES
CC: f/u Prosthetic Right knee infection - culture negative    HPI: Caitlyn Longoria is a 83 y.o. female here for prosthetic Right knee infection - culture negative. She says the knee is doing well. About 2 days ago she had gradual onset sharp intermittent pain worse w/ moving her chair but it is now resolved. No associated erythema or heat of the knee. Incision is intact. No side effects from antibiotics such as rash or diarrhea. She has not yet seen Dr warner since discharge but will call him tomorrow for an appt.      Review of Systems: no n/v/d    PMH:  R knee replacement  R knee infection due to MSSA s/p liner exchange in 2014  DM2  Hernia repair  Pacemaker     Social History:  Retired from [employer]  Lives alone in [place]     Family History:  No 1st degree relatives w/ bone or joint infections     Allergies:    1. Amoxicillin - yeast infection which is not an allergy so I am removing it from her list  2. Sulfa  3. Clarithromycin - headache    Medications:   Current Outpatient Prescriptions:   •  acetaminophen (TYLENOL) 500 MG tablet, Take 500 mg by mouth Every 6 (Six) Hours As Needed for Mild Pain ., Disp: , Rfl:   •  Acetylcysteine (N-ACETYL-L-CYSTEINE) 600 MG capsule, Take 600 mg by mouth Daily With Dinner., Disp: , Rfl:   •  amitriptyline (ELAVIL) 25 MG tablet, Take 25 mg by mouth Every Night., Disp: , Rfl:   •  aspirin 81 MG chewable tablet, Chew 81 mg Daily., Disp: , Rfl:   •  azelastine (ASTEPRO) 0.15 % solution nasal spray, 2 sprays into each nostril 2 (Two) Times a Day., Disp: , Rfl:   •  azelastine (OPTIVAR) 0.05 % ophthalmic solution, Administer 1 drop to both eyes 2 (Two) Times a Day., Disp: , Rfl:   •  baclofen (LIORESAL) 10 MG tablet, Take 10 mg by mouth 3 (Three) Times a Day As Needed., Disp: , Rfl:   •  Biotin 2.5 MG capsule, Take 1 capsule by mouth Daily., Disp: , Rfl:   •  bumetanide (BUMEX) 1 MG tablet, Take 1 mg by mouth Every Morning., Disp: , Rfl:   •  Calcium Carb-Cholecalciferol  (CALCIUM-VITAMIN D) 500-200 MG-UNIT per tablet, Take 1 tablet by mouth 2 (Two) Times a Day., Disp: , Rfl:   •  CeFAZolin Sodium (CEFAZOLIN IN SWFI 2 GM/20ML IV PUSH SYRINGE, SIMPLE,), Infuse 20 mL into a venous catheter Every 8 (Eight) Hours for 115 doses. Indications: Bone and/or Joint Infection, Disp: 1800 mL, Rfl: 1  •  cetirizine (ZyrTEC) 10 MG tablet, Take 10 mg by mouth Daily., Disp: , Rfl:   •  ciclopirox (LOPROX) 0.77 % cream, Apply 1 application topically Daily As Needed., Disp: , Rfl:   •  clobetasol (TEMOVATE) 0.05 % external solution, Apply 1 application topically At Night As Needed., Disp: , Rfl:   •  clotrimazole-betamethasone (LOTRISONE) 1-0.05 % cream, Apply 1 application topically 2 (Two) Times a Day As Needed., Disp: , Rfl:   •  dry mouth gel (BIOTENE ORALBALANCE) gel, Apply  to the mouth or throat As Needed for Dry Mouth., Disp: , Rfl:   •  flunisolide (NASALIDE) 25 MCG/ACT (0.025%) solution nasal spray, Inhale 2 sprays Every 12 (Twelve) Hours., Disp: , Rfl:   •  glipiZIDE (GLUCOTROL) 5 MG tablet, Take 5 mg by mouth Every Morning., Disp: , Rfl:   •  ketoconazole (NIZORAL) 2 % shampoo, Apply  topically 2 (Two) Times a Week., Disp: , Rfl:   •  loperamide (IMODIUM) 2 MG capsule, Take 2 mg by mouth Daily., Disp: , Rfl:   •  Melatonin 3 MG tablet, Take 3 mg by mouth Every Night., Disp: , Rfl:   •  Omega-3 Fatty Acids (FISH OIL) 1000 MG capsule capsule, Take 1,000 mg by mouth 2 (Two) Times a Day With Meals., Disp: , Rfl:   •  oxazepam (SERAX) 10 MG capsule, Take 1 capsule by mouth Every Night., Disp: 5 capsule, Rfl: 0  •  oxybutynin XL (DITROPAN-XL) 10 MG 24 hr tablet, Take 10 mg by mouth Daily., Disp: , Rfl:   •  pantoprazole (PROTONIX) 40 MG EC tablet, Take 40 mg by mouth 2 (Two) Times a Day With Meals., Disp: , Rfl:   •  raNITIdine (ZANTAC) 300 MG tablet, Take 300 mg by mouth Every Night., Disp: , Rfl:   •  rosuvastatin (CRESTOR) 10 MG tablet, Take 20 mg by mouth Daily., Disp: , Rfl:   •   "saccharomyces boulardii (FLORASTOR) 250 MG capsule, Take 250 mg by mouth 2 (Two) Times a Day., Disp: , Rfl:   •  warfarin (COUMADIN) 4 MG tablet, Take 4 mg by mouth Daily., Disp: , Rfl:     OBJECTIVE:  /76  Pulse 83  Temp 97.6 °F (36.4 °C)  Ht 162.6 cm (64.02\")  Wt 103 kg (227 lb)  BMI 38.94 kg/m2  General: awake, alert, NAD, very nice  Head: Normocephalic  Eyes: no scleral icterus  ENT: MMM, OP clear, no thrush. Fair dentition.   Neck: Supple  Cardiovascular: NR, RR, 2/6 KELLIE at LUSB; trace LE edema  Respiratory: normal work of breathing on ambient air  GI: Abdomen is obese, soft, non-tender, non-distended  Musculoskeletal: R knee incision is healed; knee is not hot or red  Skin: No rashes, lesions, or embolic phenomenon  Neurological: Alert and oriented x 3, motor strength 5/5 in all four extremities  Psychiatric: Normal mood and affect   Vasc: no cyanosis; PICC w/o erythema      DIAGNOSTICS:  HH labs 2/19/18  WBC 7.2  Plt 149  CRP < 5  Crt 1.1    Arthrocentesis R Knee:  8000 WBCs (80% PMNs)  3500 RBCs  No crystals      Microbiology:  1/14 BCx: negative  1/15 Synovial Fluid R Knee: Negative after 14 days    ASSESSMENT/PLAN:  1. Prosthetic Right knee infection - culture negative  -not a surgical candidate per my prior d/w Dr Hunter; see inpatient notes  -good clinical and lab improvement (CRP has come down). Therefore will stop IV cefazolin after 6 weeks and suppress with cephalexin 500 mg PO q8h targeting prior MSSA and Strep infection; plan 1 year of suppression  -DC PICC and antibiotics    2. Long term use of antibiotics  -weekly HH labs reviewed  -DC PICC and IV antibiotics today    3. Paroxysmal Atrial fibrillation  -on coumadin  -will not be affected by cephalexin    RTC 6 months w/ labs at that time  "

## 2018-02-28 ENCOUNTER — TELEPHONE (OUTPATIENT)
Dept: NEUROLOGY | Facility: CLINIC | Age: 83
End: 2018-02-28

## 2018-02-28 NOTE — TELEPHONE ENCOUNTER
I spoke with Ms. Longoria.  She was released from [facility] yesterday.  Doing very well.  Her knee pain has really decreased and there is no drainage.  Minimal assist with a walker.  She has a follow up appointment with Janet FOSTER March 26th and acknowledged she did get the paperwork.  Her current medications include:  Bumex 1 mg every morning, Coreg 3.125 mg every 12 hours, Glipizide 5 mg every morning, Crestor 20 mg daily, ASA 81 mg daily and Coumadin 4 mg now but subject to change according to INR.  We went over signs and symptoms of stroke and to call 911 immediately.  mRS 2.  EILEEN Brandt RN

## 2018-03-01 ENCOUNTER — TELEPHONE (OUTPATIENT)
Dept: ORTHOPEDIC SURGERY | Facility: CLINIC | Age: 83
End: 2018-03-01

## 2018-03-01 NOTE — TELEPHONE ENCOUNTER
Labs are already in the computer in patient's chart from Dr. Miller.  We do not need to repeat any further labs at this time

## 2018-03-01 NOTE — TELEPHONE ENCOUNTER
Patient recently in [facility] and seen by infectious disease doctor. She made a f/u appt with [facility] on 3/8/18 for her right knee and wants to know if [facility] needs to order labs on her to have done before the visit?

## 2018-03-06 RX ORDER — WARFARIN SODIUM 4 MG/1
TABLET ORAL
Qty: 90 TABLET | Refills: 0 | Status: SHIPPED | OUTPATIENT
Start: 2018-03-06 | End: 2018-03-12 | Stop reason: SDUPTHER

## 2018-03-08 ENCOUNTER — OFFICE VISIT (OUTPATIENT)
Dept: ORTHOPEDIC SURGERY | Facility: CLINIC | Age: 83
End: 2018-03-08

## 2018-03-08 VITALS — HEIGHT: 65 IN | TEMPERATURE: 97.5 F | WEIGHT: 216 LBS | BODY MASS INDEX: 35.99 KG/M2

## 2018-03-08 DIAGNOSIS — Z96.651 STATUS POST RIGHT KNEE REPLACEMENT: Primary | ICD-10-CM

## 2018-03-08 PROCEDURE — 99213 OFFICE O/P EST LOW 20 MIN: CPT | Performed by: ORTHOPAEDIC SURGERY

## 2018-03-08 RX ORDER — ROSUVASTATIN CALCIUM 20 MG/1
20 TABLET, COATED ORAL NIGHTLY
COMMUNITY
Start: 2017-12-12

## 2018-03-08 NOTE — PROGRESS NOTES
Patient: Caitlyn Longoria  YOB: 1934 83 y.o. female  Medical Record Number: [id number]    Chief Complaints:   Chief Complaint   Patient presents with   • Right Knee - Follow-up       History of Present Illness:Caitlyn Longoria is a 83 y.o. female who presents for follow-up of  Right total knee.  She finished a six-week course of IV antibiotics and she's on Keflex 500 mg 3 times a day.  She says the knee feels good she has no warmth no pain and is back to its baseline before we saw her last in the hospital.    Allergies:   Allergies   Allergen Reactions   • Accupril [Quinapril Hcl] Delirium     Swelling, HA,, confusion and constipation per pt   • Ahist [Chlorpheniramine] Nausea Only, Other (See Comments) and Dizziness     Headache, Blurred vision   • Amoxicillin Other (See Comments)     Yeast infection which is not an allergy; she tolerates other penicillins and cephalosporins   • Clarithromycin Nausea Only     Other reaction(s): Headache, Depression, Flushed   • Diclofenac      Voltaren   • Diphenhydramine      Benadryl   • Esomeprazole GI Intolerance     Nexium. Stomach issues   • Ibuprofen Other (See Comments)     Does not recall    • Levalbuterol Swelling     Xopenex   • Levocetirizine Other (See Comments)     Xyzal. Diarrhea, Stomach cramps   • Lipitor [Atorvastatin] Other (See Comments)     Muscle pain and weakness, dark urine   • Lodine [Etodolac]    • Omeprazole Nausea Only     Prilosec. Headache   • Pravachol [Pravastatin] Nausea Only and Other (See Comments)     Bloated, Constipation, Headaches   • Quinapril Swelling and Confusion     Accupril. Headaches, constipation   • Sulfa Antibiotics    • Sulindac Myalgia     Other reaction(s): Headache, joint pain, bruising   • Valdecoxib Irritability   • Prednisone Rash       Medications:   Current Outpatient Prescriptions   Medication Sig Dispense Refill   • acetaminophen (TYLENOL) 500 MG tablet Take 500 mg by mouth Every 6 (Six) Hours As Needed for  Mild Pain .     • Acetylcysteine (N-ACETYL-L-CYSTEINE) 600 MG capsule Take 600 mg by mouth Daily With Dinner.     • amitriptyline (ELAVIL) 25 MG tablet Take 25 mg by mouth Every Night.     • aspirin 81 MG chewable tablet Chew 81 mg Daily.     • azelastine (ASTEPRO) 0.15 % solution nasal spray 2 sprays into each nostril 2 (Two) Times a Day.     • azelastine (OPTIVAR) 0.05 % ophthalmic solution Administer 1 drop to both eyes 2 (Two) Times a Day.     • baclofen (LIORESAL) 10 MG tablet Take 10 mg by mouth 3 (Three) Times a Day As Needed.     • Biotin 2.5 MG capsule Take 1 capsule by mouth Daily.     • bumetanide (BUMEX) 1 MG tablet Take 1 mg by mouth Every Morning.     • Calcium Carb-Cholecalciferol (CALCIUM-VITAMIN D) 500-200 MG-UNIT per tablet Take 1 tablet by mouth 2 (Two) Times a Day.     • carvedilol (COREG) 3.125 MG tablet Take 3.125 mg by mouth 2 (Two) Times a Day With Meals.     • cephalexin (KEFLEX) 500 MG capsule Take 1 capsule by mouth Every 8 (Eight) Hours for 30 days. 90 capsule 5   • cetirizine (ZyrTEC) 10 MG tablet Take 10 mg by mouth Daily.     • ciclopirox (LOPROX) 0.77 % cream Apply 1 application topically Daily As Needed.     • clobetasol (TEMOVATE) 0.05 % external solution Apply 1 application topically At Night As Needed.     • clotrimazole-betamethasone (LOTRISONE) 1-0.05 % cream Apply 1 application topically 2 (Two) Times a Day As Needed.     • dry mouth gel (BIOTENE ORALBALANCE) gel Apply  to the mouth or throat As Needed for Dry Mouth.     • flunisolide (NASALIDE) 25 MCG/ACT (0.025%) solution nasal spray Inhale 2 sprays Every 12 (Twelve) Hours.     • glipiZIDE (GLUCOTROL) 5 MG tablet Take 5 mg by mouth Every Morning.     • ketoconazole (NIZORAL) 2 % shampoo Apply  topically 2 (Two) Times a Week.     • loperamide (IMODIUM) 2 MG capsule Take 2 mg by mouth Daily.     • losartan (COZAAR) 25 MG tablet Take 25 mg by mouth Daily.     • Melatonin 3 MG tablet Take 3 mg by mouth Every Night.     • Omega-3  "Fatty Acids (FISH OIL) 1000 MG capsule capsule Take 1,000 mg by mouth 2 (Two) Times a Day With Meals.     • oxazepam (SERAX) 10 MG capsule Take 1 capsule by mouth Every Night. 5 capsule 0   • oxybutynin XL (DITROPAN-XL) 10 MG 24 hr tablet Take 10 mg by mouth Daily.     • pantoprazole (PROTONIX) 40 MG EC tablet Take 40 mg by mouth 2 (Two) Times a Day With Meals.     • raNITIdine (ZANTAC) 300 MG tablet Take 300 mg by mouth Every Night.     • rosuvastatin (CRESTOR) 20 MG tablet      • saccharomyces boulardii (FLORASTOR) 250 MG capsule Take 250 mg by mouth 2 (Two) Times a Day.     • warfarin (COUMADIN) 4 MG tablet Take 4 mg by mouth Daily.     • warfarin (COUMADIN) 4 MG tablet TAKE ONE TABLET BY MOUTH DAILY 90 tablet 0     No current facility-administered medications for this visit.          The following portions of the patient's history were reviewed and updated as appropriate: allergies, current medications, past family history, past medical history, past social history, past surgical history and problem list.    Review of Systems:   A 14 point review of systems was performed. All systems negative except pertinent positives/negative listed in HPI above    Physical Exam:   Vitals:    03/08/18 1046   Temp: 97.5 °F (36.4 °C)   TempSrc: Temporal Artery    Weight: 98 kg (216 lb)   Height: 165.1 cm (65\")       General: A and O x 3, ASA, NAD    SCLERA:    Normal    DENTITION:   Normal    Knee:  right    ALIGNMENT:     Neutral  ,   Patella  tracks  Midline without crepitance    GAIT:    Slight antalgic with cane    SKIN:    Well healed midline incision, no erythema or fluctuance    RANGE OF MOTION:   0  -  120   DEG    STRENGTH:   5  / 5    LIGAMENTS:    No varus / valgus instability.   No  Flexion   instability.       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  We had a long discussion both amara at the hospital she's not a great operative candidate and would like to do regarding possible to stand the operating room.  She's tolerating the antibiotic in the knee looks fine today so we'll continue with this plan.  I will plan on seeing her in 1 year but she has a change in symptoms including increased redness and warmth fever or chills swelling in the knee or any other concerns she'll call me.  Dr. Miller is managing her oral Keflex.

## 2018-03-12 ENCOUNTER — OFFICE VISIT (OUTPATIENT)
Dept: CARDIOLOGY | Facility: CLINIC | Age: 83
End: 2018-03-12

## 2018-03-12 VITALS
SYSTOLIC BLOOD PRESSURE: 96 MMHG | BODY MASS INDEX: 37.32 KG/M2 | HEIGHT: 65 IN | DIASTOLIC BLOOD PRESSURE: 62 MMHG | HEART RATE: 82 BPM | WEIGHT: 224 LBS

## 2018-03-12 DIAGNOSIS — I50.43 ACUTE ON CHRONIC COMBINED SYSTOLIC AND DIASTOLIC CONGESTIVE HEART FAILURE (HCC): ICD-10-CM

## 2018-03-12 DIAGNOSIS — I25.10 CORONARY ARTERY DISEASE INVOLVING NATIVE CORONARY ARTERY OF NATIVE HEART WITHOUT ANGINA PECTORIS: ICD-10-CM

## 2018-03-12 DIAGNOSIS — Z95.0 PACEMAKER: ICD-10-CM

## 2018-03-12 DIAGNOSIS — I77.9 BILATERAL CAROTID ARTERY DISEASE (HCC): ICD-10-CM

## 2018-03-12 DIAGNOSIS — E11.59 TYPE 2 DIABETES MELLITUS WITH OTHER CIRCULATORY COMPLICATION, WITHOUT LONG-TERM CURRENT USE OF INSULIN (HCC): ICD-10-CM

## 2018-03-12 DIAGNOSIS — I48.0 PAROXYSMAL ATRIAL FIBRILLATION (HCC): Primary | ICD-10-CM

## 2018-03-12 PROCEDURE — 99214 OFFICE O/P EST MOD 30 MIN: CPT | Performed by: NURSE PRACTITIONER

## 2018-03-12 PROCEDURE — 93000 ELECTROCARDIOGRAM COMPLETE: CPT | Performed by: NURSE PRACTITIONER

## 2018-03-12 NOTE — PROGRESS NOTES
Date of Office Visit: 2018  Encounter Provider: KRISTIN Frey  Place of Service: [facility]  Patient Name: Caitlyn Longoria  :1934    Chief Complaint   Patient presents with   • Coronary Artery Disease   • Hypertension   • Diabetes   • Atrial Fibrillation   • Shortness of Breath   • Dizziness   • Fatigue   :     HPI: Caitlyn Longoria is a 83 y.o. female is a patient of Dr. Veliz. I am seeing her for the first time  and have reviewed her record  Her past medical history is significant of hypertension, hyperlipidemia, atrial fibrillation, coronary artery disease with history of stent, CVA, chronic systolic and diastolic congestive heart failure, chronic kidney disease stage III, and diabetes mellitus.    In 2007 it appeared that she had an anterior infarct.  She had an echocardiogram that confirmed this and ultimately had cardiac catheterization on 2008, which confirmed a left ventricular ejection fraction of 35%, total occlusion of the mid left anterior descending, severe disease of the large diagonal, and insignificant disease of the right coronary artery and circumflex.  She had angioplasty and drug-eluting stent placement of the diagonal.  Unfortunately, she developed a right femoral pseudoaneurysm and had to have that surgically repaired.  Follow-up echocardiogram in  showed left ventricular function have returned to normal.  In 2011 she presented with increasing shortness of breath and was found to have some congestive heart failure, atrial flutter, and marked bradycardia.  Echocardiogram at that time showed preserved LV function.  He had a dual-chamber pacemaker implanted.  She underwent atrial flutter ablation in 2011.  In 2012 she presented with dyspnea area she underwent cardiac catheterization which showed elevated right-sided pressures with a PA systolic pressure of 53mmHg, mean 31, and elevated wedge at 26.   She hadin-stent stenosis of the diagonal vessel.  The total occlusion of the mid LAD was unchanged.  The EF was approximately 50% and she underwent angioplasty and drug-eluting stent to the diagonal vessel, again.  Her medications were adjusted and then she developed dizziness so her Lasix was cut back.  She had a couple episodes of passing out that were attributed to low blood pressure  In 2014 She ended up having the device removed and the joint cleaned out.  While she was hospitalized she was found to have a nonfunctioning right ventricle lead was decided not to replace the lead at that time.  She then had the RV lead revised in October 2014.  In November 2016 she had some shortness of breath.  She had a perfusion stress test that showed a very small area of ischemia in the septal wall and an echocardiogram that revealed a left ventricular ejection fraction of approximately 45%significant valvular disease.  Turned out, she had reflux with aspiration and needed treatment for that.  Last seen in the office in October 2017 and was complaining that she got out of breath when walking 5-10 minutes.  Had a CTA of the head/neck that showed no evidence of acute infarction or hemorrhage.  There was vascular calcification, small vessel ischemic disease and age appropriate atrophy.  It was noted that she had significant dysarthria which resolved the day prior to discharge.  She was initially started on Plavix but she had a conjunctival hemorrhage so she was told to take aspirin 81 mg and to remain on Coumadin  It also noted atherosclerotic disease involving the carotid bifurcations bilaterally.  He had a transthoracic echocardiogram on 12/8/17 that showed severely decreased left ventricular systolic function with calculated EF of 23.4%, grade 1 diastolic dysfunction, mild LVH, moderately thickened aortic valve, moderate MAC, and mild mitral valve regurgitation.   She was hospitalized in December 2017 due to a 2 day period  of speech problem.  She was unable to have a MRI due to her pacer.  She was hospitalized on January 14 third of January 19, 2018 due to sepsis of the right knee joint.  She is disease recommended 6 weeks of IV antibiotics and she was discharged with a PICC line.   Today she presents for one month hospital follow-up.  She was unable to make the prior appointment due to hospitalization.  She is tolerating the new medication for heart failure.  His occasionally lightheaded and she has some chronic fatigue.  She is short of breath with exertion that she believes is normal for her.  She has a partially paralyzed left lung.  She denies chest pain, palpitations, stroke- like symptoms, near syncope or syncope. She ambulates with a cane. She has a visiting NP and still has OT and PT services in the home. She plans to resume INR checks in the office once these services lapse.       Allergies   Allergen Reactions   • Accupril [Quinapril Hcl] Delirium     Swelling, HA,, confusion and constipation per pt   • Ahist [Chlorpheniramine] Nausea Only, Other (See Comments) and Dizziness     Headache, Blurred vision   • Amoxicillin Other (See Comments)     Yeast infection which is not an allergy; she tolerates other penicillins and cephalosporins   • Clarithromycin Nausea Only     Other reaction(s): Headache, Depression, Flushed   • Diclofenac      Voltaren   • Diphenhydramine      Benadryl   • Esomeprazole GI Intolerance     Nexium. Stomach issues   • Ibuprofen Other (See Comments)     Does not recall    • Levalbuterol Swelling     Xopenex   • Levocetirizine Other (See Comments)     Xyzal. Diarrhea, Stomach cramps   • Lipitor [Atorvastatin] Other (See Comments)     Muscle pain and weakness, dark urine   • Lodine [Etodolac]    • Omeprazole Nausea Only     Prilosec. Headache   • Pravachol [Pravastatin] Nausea Only and Other (See Comments)     Bloated, Constipation, Headaches   • Quinapril Swelling and Confusion     Accupril. Headaches,  "constipation   • Sulfa Antibiotics    • Sulindac Myalgia     Other reaction(s): Headache, joint pain, bruising   • Valdecoxib Irritability   • Prednisone Rash       Past Medical History:   Diagnosis Date   • Abdominal pain    • Aortic calcification     mild, on echo 12/17/2017   • Aortic regurgitation     Trace   • CAD (coronary artery disease)    • Chronic combined systolic and diastolic congestive heart failure    • CKD (chronic kidney disease) stage 3, GFR 30-59 ml/min    • Coronary artery disease involving native coronary artery of native heart with angina pectoris    • Cough    • Diarrhea    • Difficulty breathing    • Dizziness    • DM type 2 (diabetes mellitus, type 2)    • Hypertension    • Mild mitral regurgitation    • Mitral annular calcification     12/8/2017- echo, moderate   • PAF (paroxysmal atrial fibrillation)    • SSS (sick sinus syndrome)    • Tricuspid regurgitation     Trace       Past Surgical History:   Procedure Laterality Date   • CARDIAC CATHETERIZATION     • CHOLECYSTECTOMY     • CORONARY STENT PLACEMENT     • HERNIA REPAIR     • HYSTERECTOMY     • PACEMAKER IMPLANTATION     • REPLACEMENT TOTAL KNEE         Family and social history reviewed.     Review of Systems   Constitution: Positive for malaise/fatigue.   Cardiovascular: Positive for dyspnea on exertion and leg swelling. Negative for orthopnea.   Respiratory: Positive for shortness of breath.    Neurological: Positive for dizziness.     All other systems were reviewed and are negative        Objective:     Vitals:    03/12/18 1435   BP: 96/62   BP Location: Left arm   Patient Position: Sitting   Pulse: 82   Weight: 102 kg (224 lb)   Height: 165.1 cm (65\")     Body mass index is 37.28 kg/m².    PHYSICAL EXAM:  Physical Exam   Constitutional: She is oriented to person, place, and time. She appears well-developed and well-nourished. No distress.   HENT:   Head: Normocephalic.   Eyes: Conjunctivae are normal.   Glasses on   Neck: Normal " range of motion. No JVD present.   Cardiovascular: Normal rate, normal heart sounds and intact distal pulses.  An irregular rhythm present.   No murmur heard.  Pulses:       Carotid pulses are 2+ on the right side, and 2+ on the left side.       Radial pulses are 2+ on the right side, and 2+ on the left side.        Posterior tibial pulses are 2+ on the right side, and 2+ on the left side.   Pulmonary/Chest: Effort normal and breath sounds normal. No respiratory distress. She has no wheezes. She has no rhonchi. She has no rales. She exhibits no tenderness.   Abdominal: Soft. Bowel sounds are normal. She exhibits no distension.   Musculoskeletal: Normal range of motion. She exhibits edema.   Ambulates with cane   Neurological: She is alert and oriented to person, place, and time.   Skin: Skin is warm, dry and intact. Bruising noted. No rash noted. She is not diaphoretic. No cyanosis.   Psychiatric: She has a normal mood and affect. Her behavior is normal. Judgment and thought content normal.         ECG 12 Lead  Date/Time: 3/12/2018 3:58 PM  Performed by: JOSEPH PADILLA  Authorized by: JOSEPH PADILLA   Comparison: compared with previous ECG from 1/14/2018  Rhythm: atrial fibrillation and paced  Rate: normal  BPM: 82  QRS axis: left  Clinical impression: abnormal ECG  Comments: Ventricular paced          Current Outpatient Prescriptions   Medication Sig Dispense Refill   • acetaminophen (TYLENOL) 500 MG tablet Take 500 mg by mouth Every 6 (Six) Hours As Needed for Mild Pain .     • amitriptyline (ELAVIL) 25 MG tablet Take 25 mg by mouth Every Night.     • aspirin 81 MG chewable tablet Chew 81 mg Daily.     • azelastine (ASTEPRO) 0.15 % solution nasal spray 2 sprays into each nostril 2 (Two) Times a Day.     • azelastine (OPTIVAR) 0.05 % ophthalmic solution Administer 1 drop to both eyes 2 (Two) Times a Day.     • baclofen (LIORESAL) 10 MG tablet Take 10 mg by mouth 3 (Three) Times a Day As Needed.     • Biotin 2.5 MG  capsule Take 1 capsule by mouth Daily.     • bumetanide (BUMEX) 1 MG tablet Take 1 mg by mouth Every Morning.     • Calcium Carb-Cholecalciferol (CALCIUM-VITAMIN D) 500-200 MG-UNIT per tablet Take 1 tablet by mouth 2 (Two) Times a Day.     • carvedilol (COREG) 3.125 MG tablet Take 3.125 mg by mouth 2 (Two) Times a Day With Meals.     • cephalexin (KEFLEX) 500 MG capsule Take 1 capsule by mouth Every 8 (Eight) Hours for 30 days. 90 capsule 5   • cetirizine (ZyrTEC) 10 MG tablet Take 10 mg by mouth Daily.     • ciclopirox (LOPROX) 0.77 % cream Apply 1 application topically Daily As Needed.     • clotrimazole-betamethasone (LOTRISONE) 1-0.05 % cream Apply 1 application topically 2 (Two) Times a Day As Needed.     • dry mouth gel (BIOTENE ORALBALANCE) gel Apply  to the mouth or throat As Needed for Dry Mouth.     • flunisolide (NASALIDE) 25 MCG/ACT (0.025%) solution nasal spray Inhale 2 sprays Every 12 (Twelve) Hours.     • glipiZIDE (GLUCOTROL) 5 MG tablet Take 5 mg by mouth Every Morning.     • ketoconazole (NIZORAL) 2 % shampoo Apply  topically 2 (Two) Times a Week.     • loperamide (IMODIUM) 2 MG capsule Take 2 mg by mouth Daily.     • losartan (COZAAR) 25 MG tablet Take 25 mg by mouth Daily.     • Melatonin 3 MG tablet Take 3 mg by mouth Every Night.     • Omega-3 Fatty Acids (FISH OIL) 1000 MG capsule capsule Take 1,000 mg by mouth 2 (Two) Times a Day With Meals.     • oxazepam (SERAX) 10 MG capsule Take 1 capsule by mouth Every Night. 5 capsule 0   • oxybutynin XL (DITROPAN-XL) 10 MG 24 hr tablet Take 10 mg by mouth Daily.     • pantoprazole (PROTONIX) 40 MG EC tablet Take 40 mg by mouth 2 (Two) Times a Day With Meals.     • Probiotic Product (ALIGN PO) Take  by mouth Daily.     • raNITIdine (ZANTAC) 300 MG tablet Take 300 mg by mouth Every Night.     • rosuvastatin (CRESTOR) 20 MG tablet      • warfarin (COUMADIN) 4 MG tablet Take 4 mg by mouth Daily.       No current facility-administered medications for this  visit.      Assessment:       Diagnosis Plan   1. Paroxysmal atrial fibrillation     2. Type 2 diabetes mellitus with other circulatory complication, without long-term current use of insulin     3. Coronary artery disease involving native coronary artery of native heart without angina pectoris     4. Pacemaker     5. Acute on chronic combined systolic and diastolic congestive heart failure  Adult Transthoracic Echo Complete W/ Cont if Necessary Per Protocol   6. Bilateral carotid artery disease          Orders Placed This Encounter   Procedures   • ECG 12 Lead     This order was created via procedure documentation   • Adult Transthoracic Echo Complete W/ Cont if Necessary Per Protocol     Standing Status:   Future     Standing Expiration Date:   3/12/2019     Scheduling Instructions:      Same day as next appt on 5/22/18     Order Specific Question:   Reason for exam?     Answer:   Heart Failure, Cardiomyopathy, or Sytemic or Pulmonary Hypertension     Order Specific Question:   Hypertension, Heart Failure, or Cardiomyopathy specification?     Answer:   Known Heart Failure         Plan:     Very pleasant 83-year-old female with a history of diabetes, atrial fibrillation, and new CVA.  She was hospitalized in December due to dysarthria found to have bilateral calcification of the carotid arteries.  Unable to tolerate Plavix and was discharged with 81 mg aspirin and was told to continue Coumadin.  Her EF was severely reduced at 23.4 %and she was changed from Lasix to Bumex was started on carvedilol 3.125 mg twice a day and losartan 25 mg daily.  Overall she has not noticed any new symptoms. She is on ASA 81 mg and Coumadin.  Will plan to repeat an echocardiogram at her next visit in order to reassess left ventricular systolic function to discuss if she may benefit from an ICD if still reduced.    1. Coronary Artery Disease  Assessment  • The patient has no angina  • On warfarin    Plan  • Lifestyle modifications  discussed include adhering to a heart healthy diet, avoidance of tobacco products, maintenance of a healthy weight, medication compliance, regular exercise and regular monitoring of cholesterol and blood pressure    Subjective - Objective  • There is a history of past MI  • There has been a previous stent procedure using LOLA  • Current antiplatelet therapy includes aspirin 81 mg        2. Atrial Fibrillation and Atrial Flutter  Assessment  • The patient has persistent atrial fibrillation  • This is non-valvular in etiology  • The patient's CHADS2-VASc score is 9  • A [id number] score of 2 or more is considered a high risk for a thromboembolic event  • Warfarin prescribed    Plan  • Continue in atrial fibrillation with rate control  • Continue warfarin for antithrombotic therapy, bleeding issues discussed  • Continue beta blocker for rhythm control    3.HTN- Blood pressure of 96/62 today. She has readings over the past week of 120/60 from home readings with the visiting NP.     4. CVA-  neurosurgery recommended starting an antiplatelets. She is currently On ASA 81 mg    5. Carotid Artery disease- rotted duplex on 12/8/17 showed Plake without significant stenosis in the right and left internal carotid arteries.  6.Hyperlipidemia- on Crestor 10mg  7. Heart Failure  Assessment  • NYHA class II - There is slight limitation of physical activity. The patient is comfortable at rest, but physical activity results in fatigue, palpitations or shortness of breath.  • The patient was newly diagnosed with heart failure within the past 12 months  • Beta blocker prescribed  • Diuretics prescribed  • Angiotensin receptor blocker (ARB) prescribed  • The most recent ejection fraction is 23%  • Left ventricular function is severely reduced by qualitative assessment  • The left ventricle was last assessed on 12/8/2017    Plan  • The patient has received heart failure education on the following topics: dietary sodium restriction,  medication instructions, minimizing or avoiding NSAID use, symptom management, physical activity and weight monitoring  • The heart failure care plan was discussed with the patient today including: continuing the current program and lifestyle modifications  •  The patient was not counseled about ICD or CRT-D implantation  • Will repeat echo next visit to reassess EF since now on BB and ARB. She had been on diuretic prior to hospitalization.     Subjective/Objective  • The patient reports dyspnea    • Physical exam findings negative for rales and elevated JVP.      Follow up on 5/22/18 with Dr farley and have echocardiogram repeated on the same day.    Patient was instructed to call the office if new symptoms develop or report to nearest ER if heart attack or stroke is suspected.        It has been a pleasure to participate in this patient's care.      Thank you,  KRISTIN Frey

## 2018-03-12 NOTE — PATIENT INSTRUCTIONS
"DASH Eating Plan  DASH stands for \"Dietary Approaches to Stop Hypertension.\" The DASH eating plan is a healthy eating plan that has been shown to reduce high blood pressure (hypertension). It may also reduce your risk for type 2 diabetes, heart disease, and stroke. The DASH eating plan may also help with weight loss.  What are tips for following this plan?  General guidelines   · Avoid eating more than 2,300 mg (milligrams) of salt (sodium) a day. If you have hypertension, you may need to reduce your sodium intake to 1,500 mg a day.  · Limit alcohol intake to no more than 1 drink a day for nonpregnant women and 2 drinks a day for men. One drink equals 12 oz of beer, 5 oz of wine, or 1½ oz of hard liquor.  · Work with your health care provider to maintain a healthy body weight or to lose weight. Ask what an ideal weight is for you.  · Get at least 30 minutes of exercise that causes your heart to beat faster (aerobic exercise) most days of the week. Activities may include walking, swimming, or biking.  · Work with your health care provider or diet and nutrition specialist (dietitian) to adjust your eating plan to your individual calorie needs.  Reading food labels   · Check food labels for the amount of sodium per serving. Choose foods with less than 5 percent of the Daily Value of sodium. Generally, foods with less than 300 mg of sodium per serving fit into this eating plan.  · To find whole grains, look for the word \"whole\" as the first word in the ingredient list.  Shopping   · Buy products labeled as \"low-sodium\" or \"no salt added.\"  · Buy fresh foods. Avoid canned foods and premade or frozen meals.  Cooking   · Avoid adding salt when cooking. Use salt-free seasonings or herbs instead of table salt or sea salt. Check with your health care provider or pharmacist before using salt substitutes.  · Do not thrasher foods. Cook foods using healthy methods such as baking, boiling, grilling, and broiling instead.  · Cook with " heart-healthy oils, such as olive, canola, soybean, or sunflower oil.  Meal planning     · Eat a balanced diet that includes:  ¨ 5 or more servings of fruits and vegetables each day. At each meal, try to fill half of your plate with fruits and vegetables.  ¨ Up to 6-8 servings of whole grains each day.  ¨ Less than 6 oz of lean meat, poultry, or fish each day. A 3-oz serving of meat is about the same size as a deck of cards. One egg equals 1 oz.  ¨ 2 servings of low-fat dairy each day.  ¨ A serving of nuts, seeds, or beans 5 times each week.  ¨ Heart-healthy fats. Healthy fats called Omega-3 fatty acids are found in foods such as flaxseeds and coldwater fish, like sardines, salmon, and mackerel.  · Limit how much you eat of the following:  ¨ Canned or prepackaged foods.  ¨ Food that is high in trans fat, such as fried foods.  ¨ Food that is high in saturated fat, such as fatty meat.  ¨ Sweets, desserts, sugary drinks, and other foods with added sugar.  ¨ Full-fat dairy products.  · Do not salt foods before eating.  · Try to eat at least 2 vegetarian meals each week.  · Eat more home-cooked food and less restaurant, buffet, and fast food.  · When eating at a restaurant, ask that your food be prepared with less salt or no salt, if possible.  What foods are recommended?  The items listed may not be a complete list. Talk with your dietitian about what dietary choices are best for you.  Grains   Whole-grain or whole-wheat bread. Whole-grain or whole-wheat pasta. Brown rice. Oatmeal. Quinoa. Bulgur. Whole-grain and low-sodium cereals. Amada bread. Low-fat, low-sodium crackers. Whole-wheat flour tortillas.  Vegetables   Fresh or frozen vegetables (raw, steamed, roasted, or grilled). Low-sodium or reduced-sodium tomato and vegetable juice. Low-sodium or reduced-sodium tomato sauce and tomato paste. Low-sodium or reduced-sodium canned vegetables.  Fruits   All fresh, dried, or frozen fruit. Canned fruit in natural juice  (without added sugar).  Meat and other protein foods   Skinless chicken or turkey. Ground chicken or turkey. Pork with fat trimmed off. Fish and seafood. Egg whites. Dried beans, peas, or lentils. Unsalted nuts, nut butters, and seeds. Unsalted canned beans. Lean cuts of beef with fat trimmed off. Low-sodium, lean deli meat.  Dairy   Low-fat (1%) or fat-free (skim) milk. Fat-free, low-fat, or reduced-fat cheeses. Nonfat, low-sodium ricotta or cottage cheese. Low-fat or nonfat yogurt. Low-fat, low-sodium cheese.  Fats and oils   Soft margarine without trans fats. Vegetable oil. Low-fat, reduced-fat, or light mayonnaise and salad dressings (reduced-sodium). Canola, safflower, olive, soybean, and sunflower oils. Avocado.  Seasoning and other foods   Herbs. Spices. Seasoning mixes without salt. Unsalted popcorn and pretzels. Fat-free sweets.  What foods are not recommended?  The items listed may not be a complete list. Talk with your dietitian about what dietary choices are best for you.  Grains   Baked goods made with fat, such as croissants, muffins, or some breads. Dry pasta or rice meal packs.  Vegetables   Creamed or fried vegetables. Vegetables in a cheese sauce. Regular canned vegetables (not low-sodium or reduced-sodium). Regular canned tomato sauce and paste (not low-sodium or reduced-sodium). Regular tomato and vegetable juice (not low-sodium or reduced-sodium). Pickles. Olives.  Fruits   Canned fruit in a light or heavy syrup. Fried fruit. Fruit in cream or butter sauce.  Meat and other protein foods   Fatty cuts of meat. Ribs. Fried meat. [place]. Sausage. Bologna and other processed lunch meats. Salami. Fatback. Hotdogs. Bratwurst. Salted nuts and seeds. Canned beans with added salt. Canned or smoked fish. Whole eggs or egg yolks. Chicken or turkey with skin.  Dairy   Whole or 2% milk, cream, and half-and-half. Whole or full-fat cream cheese. Whole-fat or sweetened yogurt. Full-fat cheese. Nondairy creamers.  Whipped toppings. Processed cheese and cheese spreads.  Fats and oils   Butter. Stick margarine. Lard. Shortening. Ghee. Troy fat. Tropical oils, such as coconut, palm kernel, or palm oil.  Seasoning and other foods   Salted popcorn and pretzels. Onion salt, garlic salt, seasoned salt, table salt, and sea salt. Worcestershire sauce. Tartar sauce. Barbecue sauce. Teriyaki sauce. Soy sauce, including reduced-sodium. Steak sauce. Canned and packaged gravies. Fish sauce. Oyster sauce. Cocktail sauce. Horseradish that you find on the shelf. Ketchup. Mustard. Meat flavorings and tenderizers. Bouillon cubes. Hot sauce and Tabasco sauce. Premade or packaged marinades. Premade or packaged taco seasonings. Relishes. Regular salad dressings.  Where to find more information:  · National Heart, Lung, and Blood [place]: www.nhlbi.nih.gov  · American Heart Association: www.heart.org  Summary  · The DASH eating plan is a healthy eating plan that has been shown to reduce high blood pressure (hypertension). It may also reduce your risk for type 2 diabetes, heart disease, and stroke.  · With the DASH eating plan, you should limit salt (sodium) intake to 2,300 mg a day. If you have hypertension, you may need to reduce your sodium intake to 1,500 mg a day.  · When on the DASH eating plan, aim to eat more fresh fruits and vegetables, whole grains, lean proteins, low-fat dairy, and heart-healthy fats.  · Work with your health care provider or diet and nutrition specialist (dietitian) to adjust your eating plan to your individual calorie needs.  This information is not intended to replace advice given to you by your health care provider. Make sure you discuss any questions you have with your health care provider.  Document Released: 12/06/2012 Document Revised: 12/11/2017 Document Reviewed: 12/11/2017  [employer] Interactive Patient Education © 2017 [employer]  Heart Failure  Heart failure is a condition in which the heart has trouble  pumping blood because it has become weak or stiff. This means that the heart does not pump blood efficiently for the body to work well. For some people with heart failure, fluid may back up into the lungs and there may be swelling (edema) in the lower legs. Heart failure is usually a long-term (chronic) condition. It is important for you to take good care of yourself and follow the treatment plan from your health care provider.  What are the causes?  This condition is caused by some health problems, including:  · High blood pressure (hypertension). Hypertension causes the heart muscle to work harder than normal. High blood pressure eventually causes the heart to become stiff and weak.  · Coronary artery disease (CAD). CAD is the buildup of cholesterol and fat (plaques) in the arteries of the heart.  · Heart attack (myocardial infarction). Injured tissue, which is caused by the heart attack, does not contract as well and the heart's ability to pump blood is weakened.  · Abnormal heart valves. When the heart valves do not open and close properly, the heart muscle must pump harder to keep the blood flowing.  · Heart muscle disease (cardiomyopathy or myocarditis). Heart muscle disease is damage to the heart muscle from a variety of causes, such as drug or alcohol abuse, infections, or unknown causes. These can increase the risk of heart failure.  · Lung disease. When the lungs do not work properly, the heart must work harder.  What increases the risk?  Risk of heart failure increases as a person ages. This condition is also more likely to develop in people who:  · Are overweight.  · Are male.  · Smoke or chew tobacco.  · Abuse alcohol or illegal drugs.  · Have taken medicines that can damage the heart, such as chemotherapy drugs.  · Have diabetes.  ¨ High blood sugar (glucose) is associated with high fat (lipid) levels in the blood.  ¨ Diabetes can also damage tiny blood vessels that carry nutrients to the heart  muscle.  · Have abnormal heart rhythms.  · Have thyroid problems.  · Have low blood counts (anemia).  What are the signs or symptoms?  Symptoms of this condition include:  · Shortness of breath with activity, such as when climbing stairs.  · Persistent cough.  · Swelling of the feet, ankles, legs, or abdomen.  · Unexplained weight gain.  · Difficulty breathing when lying flat (orthopnea).  · Waking from sleep because of the need to sit up and get more air.  · Rapid heartbeat.  · Fatigue and loss of energy.  · Feeling light-headed, dizzy, or close to fainting.  · Loss of appetite.  · Nausea.  · Increased urination during the night (nocturia).  · Confusion.  How is this diagnosed?  This condition is diagnosed based on:  · Medical history, symptoms, and a physical exam.  · Diagnostic tests, which may include:  ¨ Echocardiogram.  ¨ Electrocardiogram (ECG).  ¨ Chest X-ray.  ¨ Blood tests.  ¨ Exercise stress test.  ¨ Radionuclide scans.  ¨ Cardiac catheterization and angiogram.  How is this treated?  Treatment for this condition is aimed at managing the symptoms of heart failure. Medicines, behavioral changes, or other treatments may be necessary to treat heart failure.  Medicines   These may include:  · Angiotensin-converting enzyme (ACE) inhibitors. This type of medicine blocks the effects of a blood protein called angiotensin-converting enzyme. ACE inhibitors relax (dilate) the blood vessels and help to lower blood pressure.  · Angiotensin receptor blockers (ARBs). This type of medicine blocks the actions of a blood protein called angiotensin. ARBs dilate the blood vessels and help to lower blood pressure.  · Water pills (diuretics). Diuretics cause the kidneys to remove salt and water from the blood. The extra fluid is removed through urination, leaving a lower volume of blood that the heart has to pump.  · Beta blockers. These improve heart muscle strength and they prevent the heart from beating too  quickly.  · Digoxin. This increases the force of the heartbeat.  Healthy behavior changes   These may include:  · Reaching and maintaining a healthy weight.  · Stopping smoking or chewing tobacco.  · Eating heart-healthy foods.  · Limiting or avoiding alcohol.  · Stopping use of street drugs (illegal drugs).  · Physical activity.  Other treatments   These may include:  · Surgery to open blocked coronary arteries or repair damaged heart valves.  · Placement of a biventricular pacemaker to improve heart muscle function (cardiac resynchronization therapy). This device paces both the right ventricle and left ventricle.  · Placement of a device to treat serious abnormal heart rhythms (implantable cardioverter defibrillator, or ICD).  · Placement of a device to improve the pumping ability of the heart (left ventricular assist device, or LVAD).  · Heart transplant. This can cure heart failure, and it is considered for certain patients who do not improve with other therapies.  Follow these instructions at home:  Medicines   · Take over-the-counter and prescription medicines only as told by your health care provider. Medicines are important in reducing the workload of your heart, slowing the progression of heart failure, and improving your symptoms.  ¨ Do not stop taking your medicine unless your health care provider told you to do that.  ¨ Do not skip any dose of medicine.  ¨ Refill your prescriptions before you run out of medicine. You need your medicines every day.  Eating and drinking     · Eat heart-healthy foods. Talk with a dietitian to make an eating plan that is right for you.  ¨ Choose foods that contain no trans fat and are low in saturated fat and cholesterol. Healthy choices include fresh or frozen fruits and vegetables, fish, lean meats, legumes, fat-free or low-fat dairy products, and whole-grain or high-fiber foods.  ¨ Limit salt (sodium) if directed by your health care provider. Sodium restriction may reduce  symptoms of heart failure. Ask a dietitian to recommend heart-healthy seasonings.  ¨ Use healthy cooking methods instead of frying. Healthy methods include roasting, grilling, broiling, baking, poaching, steaming, and stir-frying.  · Limit your fluid intake if directed by your health care provider. Fluid restriction may reduce symptoms of heart failure.  Lifestyle   · Stop smoking or using chewing tobacco. Nicotine and tobacco can damage your heart and your blood vessels. Do not use nicotine gum or patches before talking to your health care provider.  · Limit alcohol intake to no more than 1 drink per day for non-pregnant women and 2 drinks per day for men. One drink equals 12 oz of beer, 5 oz of wine, or 1½ oz of hard liquor.  ¨ Drinking more than that is harmful to your heart. Tell your health care provider if you drink alcohol several times a week.  ¨ Talk with your health care provider about whether any level of alcohol use is safe for you.  ¨ If your heart has already been damaged by alcohol or you have severe heart failure, drinking alcohol should be stopped completely.  · Stop use of illegal drugs.  · Lose weight if directed by your health care provider. Weight loss may reduce symptoms of heart failure.  · Do moderate physical activity if directed by your health care provider. People who are elderly and people with severe heart failure should consult with a health care provider for physical activity recommendations.  Monitor important information   · Weigh yourself every day. Keeping track of your weight daily helps you to notice excess fluid sooner.  ¨ Weigh yourself every morning after you urinate and before you eat breakfast.  ¨ Wear the same amount of clothing each time you weigh yourself.  ¨ Record your daily weight. Provide your health care provider with your weight record.  · Monitor and record your blood pressure as told by your health care provider.  · Check your pulse as told by your health care  provider.  Dealing with extreme temperatures   · If the weather is extremely hot:  ¨ Avoid vigorous physical activity.  ¨ Use air conditioning or fans or seek a cooler location.  ¨ Avoid caffeine and alcohol.  ¨ Wear loose-fitting, lightweight, and light-colored clothing.  · If the weather is extremely cold:  ¨ Avoid vigorous physical activity.  ¨ Layer your clothes.  ¨ Wear mittens or gloves, a hat, and a scarf when you go outside.  ¨ Avoid alcohol.  General instructions   · Manage other health conditions such as hypertension, diabetes, thyroid disease, or abnormal heart rhythms as told by your health care provider.  · Learn to manage stress. If you need help to do this, ask your health care provider.  · Plan rest periods when fatigued.  · Get ongoing education and support as needed.  · Participate in or seek rehabilitation as needed to maintain or improve independence and quality of life.  · Stay up to date with immunizations. Keeping current on pneumococcal and influenza immunizations is especially important to prevent respiratory infections.  · Keep all follow-up visits as told by your health care provider. This is important.  Contact a health care provider if:  · You have a rapid weight gain.  · You have increasing shortness of breath that is unusual for you.  · You are unable to participate in your usual physical activities.  · You tire easily.  · You cough more than normal, especially with physical activity.  · You have any swelling or more swelling in areas such as your hands, feet, ankles, or abdomen.  · You are unable to sleep because it is hard to breathe.  · You feel like your heart is beating quickly (palpitations).  · You become dizzy or light-headed when you stand up.  Get help right away if:  · You have difficulty breathing.  · You notice or your family notices a change in your awareness, such as having trouble staying awake or having difficulty with concentration.  · You have pain or discomfort in  your chest.  · You have an episode of fainting (syncope).  This information is not intended to replace advice given to you by your health care provider. Make sure you discuss any questions you have with your health care provider.  Document Released: 12/18/2006 Document Revised: 08/22/2017 Document Reviewed: 07/12/2017  [employer] Interactive Patient Education © 2017 [employer]

## 2018-03-21 ENCOUNTER — TELEPHONE (OUTPATIENT)
Dept: CARDIOLOGY | Facility: CLINIC | Age: 83
End: 2018-03-21

## 2018-03-21 NOTE — TELEPHONE ENCOUNTER
Patient called to report continued fatigue. She was seen by her PCP (Zenaida Suarez) who recommended she contact us to consider switching her from Carvedilol to Metoprolol. The pt states she has noticed increased fatigue since being d/c'd , and started on Carvedilol, Losartan and Bumex. Her b/p @ home is running 102-130's/60-80's. HR 68-96 bpm. B/P in PCP's office yday was 90/64, HR-80.  Please advise [phone]/amk    Med list reviewed & utd. She is scheduled to follow up w/Dr Veliz on 5/22/18

## 2018-03-22 NOTE — TELEPHONE ENCOUNTER
I spoke with patient. She had decreased EF in December and was restarted on heart failure medication. She could be fatigued from the medications and the weakened heart but I have instructed her to stop coreg 3.125 BID once she picks up new prescription for Lopressor 12.5BID. She verbalized understanding. AL

## 2018-03-27 ENCOUNTER — HOSPITAL ENCOUNTER (OUTPATIENT)
Dept: CARDIOLOGY | Facility: HOSPITAL | Age: 83
Setting detail: RECURRING SERIES
Discharge: HOME | End: 2018-03-27

## 2018-03-27 PROCEDURE — 85610 PROTHROMBIN TIME: CPT

## 2018-03-27 PROCEDURE — 36416 COLLJ CAPILLARY BLOOD SPEC: CPT

## 2018-04-02 ENCOUNTER — CLINICAL SUPPORT NO REQUIREMENTS (OUTPATIENT)
Dept: CARDIOLOGY | Facility: CLINIC | Age: 83
End: 2018-04-02

## 2018-04-02 ENCOUNTER — TELEPHONE (OUTPATIENT)
Dept: CARDIOLOGY | Facility: CLINIC | Age: 83
End: 2018-04-02

## 2018-04-02 DIAGNOSIS — I48.92 ATRIAL FIBRILLATION AND FLUTTER (HCC): Primary | ICD-10-CM

## 2018-04-02 DIAGNOSIS — I48.0 PAROXYSMAL ATRIAL FIBRILLATION (HCC): ICD-10-CM

## 2018-04-02 DIAGNOSIS — I48.91 ATRIAL FIBRILLATION AND FLUTTER (HCC): Primary | ICD-10-CM

## 2018-04-02 DIAGNOSIS — I49.5 SICK SINUS SYNDROME (HCC): Primary | ICD-10-CM

## 2018-04-02 PROCEDURE — 93280 PM DEVICE PROGR EVAL DUAL: CPT | Performed by: INTERNAL MEDICINE

## 2018-04-02 RX ORDER — CARVEDILOL 3.12 MG/1
3.12 TABLET ORAL 2 TIMES DAILY
Qty: 60 TABLET | Refills: 11 | Status: SHIPPED | OUTPATIENT
Start: 2018-04-02 | End: 2019-04-17 | Stop reason: SDUPTHER

## 2018-04-02 NOTE — TELEPHONE ENCOUNTER
Patient was started on metoprolol tartrate 25 mg Last week on the 24th.  Starting on the 30 th, her leg became pink, warm, and swollen.  Denies pain.  It is only in one leg. She is coming in today for a pacemaker check at 10:30.  Will you  be able to look at it while she is here, or [place]?      Chiquita

## 2018-04-04 ENCOUNTER — TELEPHONE (OUTPATIENT)
Dept: ORTHOPEDIC SURGERY | Facility: CLINIC | Age: 83
End: 2018-04-04

## 2018-04-04 NOTE — TELEPHONE ENCOUNTER
Call return to the patient.  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  Presently she is on Keflex 500 mg 3 times a day.  I have advised her that she will need to take the prophylactic dose prior to her dental appointment.  Would suggest on the day of her appointment that she take 4 capsules of the Keflex 500 mg an hour before her dental procedure.  And hold off on the other doses of her antibiotics for that day.  She will resume her regular Keflex 3 times a day the following day

## 2018-04-06 ENCOUNTER — TELEPHONE (OUTPATIENT)
Dept: CARDIOLOGY | Facility: CLINIC | Age: 83
End: 2018-04-06

## 2018-04-06 NOTE — TELEPHONE ENCOUNTER
----- Message from KRISTIN Frey sent at 4/2/2018 10:57 AM EDT -----  Call and follow up on BP, heart rates and right LE edema      4/3- 137/74, 85          101/59, 75    4/4-99/65-88        103/63-89    4/5-119/76-85         110/56-86    124/65-80  Lower extremity edema has improved. She is elevating it as much as possible.    Will continue the coreg 3.125 BID    AL

## 2018-04-10 ENCOUNTER — HOSPITAL ENCOUNTER (OUTPATIENT)
Dept: CARDIOLOGY | Facility: HOSPITAL | Age: 83
Setting detail: RECURRING SERIES
Discharge: HOME | End: 2018-04-10

## 2018-04-10 PROCEDURE — 85610 PROTHROMBIN TIME: CPT

## 2018-04-10 PROCEDURE — 36416 COLLJ CAPILLARY BLOOD SPEC: CPT

## 2018-04-13 ENCOUNTER — OFFICE VISIT (OUTPATIENT)
Dept: NEUROLOGY | Facility: CLINIC | Age: 83
End: 2018-04-13

## 2018-04-13 VITALS
SYSTOLIC BLOOD PRESSURE: 98 MMHG | HEIGHT: 65 IN | DIASTOLIC BLOOD PRESSURE: 64 MMHG | OXYGEN SATURATION: 93 % | HEART RATE: 71 BPM | BODY MASS INDEX: 37.15 KG/M2 | WEIGHT: 223 LBS

## 2018-04-13 DIAGNOSIS — I10 ESSENTIAL HYPERTENSION: ICD-10-CM

## 2018-04-13 DIAGNOSIS — E78.5 HYPERLIPIDEMIA, UNSPECIFIED HYPERLIPIDEMIA TYPE: ICD-10-CM

## 2018-04-13 DIAGNOSIS — I63.032 CEREBROVASCULAR ACCIDENT (CVA) DUE TO THROMBOSIS OF LEFT CAROTID ARTERY (HCC): ICD-10-CM

## 2018-04-13 DIAGNOSIS — E11.59 TYPE 2 DIABETES MELLITUS WITH OTHER CIRCULATORY COMPLICATION, UNSPECIFIED LONG TERM INSULIN USE STATUS: ICD-10-CM

## 2018-04-13 DIAGNOSIS — I48.0 PAROXYSMAL ATRIAL FIBRILLATION (HCC): ICD-10-CM

## 2018-04-13 PROCEDURE — 99213 OFFICE O/P EST LOW 20 MIN: CPT | Performed by: NURSE PRACTITIONER

## 2018-04-13 RX ORDER — CEPHALEXIN 500 MG/1
500 CAPSULE ORAL
COMMUNITY
Start: 2018-03-20 | End: 2018-08-27 | Stop reason: ALTCHOICE

## 2018-04-13 NOTE — PROGRESS NOTES
DOS: 2018  NAME: Caitlyn Longoria   : 1934  PCP: Zenaida Suarez MD    Chief Complaint   Patient presents with   • Cerebrovascular Accident        Neurological Problem and Interval History:  83 y.o. RHW female with CAD, AF, CHF, HLD, DM, HTN, left carotid stenosis and stroke. She presents today for her 3 month stroke follow up.     She denies any S/S of stroke. No falls or H/As. She does have some blurred vision when she is reading, like her eyeys are getitng tird. She is using her walker more now because of balance trouble on uneven ground. She was in the hospital in january for her blood infection that starts in her knee. She continues to have some trouble with words sometimes but it eveentually come to her. She is not on Plavix because she had a red eye in January and they switched her to 325mg of ASA. She then started having nose bleeds and her cardiologist put her on baby ASA. She was not on ASA at the time of the event in December    Her BP has been good, SBP /80. She does not check her BP at home.     17 she presented to [facility] after two days of speech trouble. This was sudden in onset. She was on coumadin and therapeutic at the time. She could not have an MRI due to her pacemaker. Her symptoms began to improve after admission. She had no other S/S of stroke. She was also Dx with ulcerated, bulky plaque in the LICA, no significant stenosis. She was not on ASA and Tricor at the time of the event. She was D/C home on Plavix and Crestor.     Review of Systems:        Review of Systems   Constitutional: Positive for fatigue. Negative for chills and fever.   HENT: Positive for hearing loss (hearing aids), tinnitus and trouble swallowing.    Eyes: Positive for visual disturbance (blurry). Negative for pain, redness and itching.   Respiratory: Positive for shortness of breath and wheezing. Negative for apnea, cough and choking.    Cardiovascular: Positive for leg swelling. Negative  "for chest pain and palpitations.   Gastrointestinal: Negative for blood in stool, constipation, diarrhea, nausea and vomiting.   Endocrine: Negative for cold intolerance, heat intolerance and polyphagia.   Genitourinary: Positive for difficulty urinating (ckd stage 3). Negative for decreased urine volume, frequency and urgency.   Musculoskeletal: Positive for gait problem (uses a walker since .) and neck pain. Negative for back pain and neck stiffness.   Skin: Negative for color change, rash and wound.   Allergic/Immunologic: Positive for environmental allergies. Negative for food allergies and immunocompromised state.   Neurological: Positive for numbness (hands left more than right). Negative for dizziness, tremors, seizures, syncope, facial asymmetry, speech difficulty, weakness, light-headedness and headaches.   Hematological: Negative for adenopathy. Bruises/bleeds easily.   Psychiatric/Behavioral: Positive for sleep disturbance. Negative for agitation, behavioral problems, confusion, decreased concentration, dysphoric mood, hallucinations, self-injury and suicidal ideas. The patient is not nervous/anxious and is not hyperactive.        \"The following portions of the patient's history were reviewed and updated as appropriate: allergies, current medications, past family history, past medical history, past social history, past surgical history and problem list.\"  Review and Interpretation of Imagin2017 carotid US  Right  ICA Prox -62.1 cm/sec        -16.4 cm/sec          Left   ICA Prox -50.4 cm/sec        -17.0 cm/sec             Laboratory Results:             Lab Results   Component Value Date    INR 3.92 (H) 2018    INR 2.29 (H) 2018    INR 1.69 (H) 2018    PROTIME 37.8 (H) 2018    PROTIME 24.4 (H) 2018    PROTIME 19.3 (H) 2018       Lab Results   Component Value Date    HGBA1C 6.60 (H) 2017         Lab Results   Component Value Date    CHOL 152 " 12/08/2017         Lab Results   Component Value Date    HDL 50 12/08/2017    HDL 5 (L) 03/26/2014         Lab Results   Component Value Date    LDL 77 12/08/2017    LDL 26 03/26/2014         Lab Results   Component Value Date    TRIG 124 12/08/2017    TRIG 205 (H) 03/26/2014     No results found for: RPR  No results found for: TSH  No results found for: [id number]    Physical Examination:  mRS: 1  General Appearance:   Well developed, obese, well groomed, alert, and cooperative.  HEENT: Normocephalic.    Neck and Spine: No bruits.  Cardiac: Regular rate and rhythm. No murmurs.  Extremities:    No edema or deformities.     Neurological examination:  Higher Integrative  Function: Oriented to time, place and person. Normal registration, attention span and concentration. Normal language including comprehension, spontaneous speech, repetition, reading, writing, naming and vocabulary. No neglect with normal visual-spatial function and construction. Normal fund of knowledge and higher integrative function.  CN V: Normal facial sensation and strength of muscles of mastication.  CN VII: Facial movements are symmetric. No weakness.  CN VIII:   Auditory acuity is normal.  CN IX & X:   Symmetric palatal movement.  CN XI: Sternocleidomastoid and trapezius are normal.  No weakness.  CN XII:   The tongue is midline.  No atrophy or fasciculations.  Motor: Normal muscle strength, bulk and tone in upper and lower extremities.  No fasciculations, rigidity, spasticity, or abnormal movements.  Sensation: Normal to light touch, temperature, and proprioception in arms and legs.   Station and Gait: Slow, cautious, uses a walker     Coordination: Rapid alternating movements are normal.      Diagnoses / Discussion:  Ms. Longoria is an 82yo who presented today for he 3 month stroke follow up which caused aphasia. Imaging did not reveal evidence of an acute stroke but imaging was limited, not able to have MRI. The etiology likely artery to  artery embolism from her LICA, large bulky hypodense ulcerated plaque in the left common carotid artery extending into the internal carotid artery without significant stenosis. This is associated with a high risk of embolization. She needs continued maximal medical therapy with high dose statin and Plavix. However, she is on ASA 81mg and Crestor 20mg. Ideally she should be on Plavix, was stopped for blood shot eye. If she has recurrent events this will need to be re-considered.Goal LDL 40-70. Continue warfarin for atrial fib and stroke prevention. She needs continued aggressive risk factor control, BP and BS. We will repeat a carotid US in 8 months to monitor for progression. No intervention indicated at this time, asymptomatic and has not failed medical therapy. She agrees with the plan and will call with any questions or concerns.     Plan:   Continue ASA 81mg, Crestor and Coumadin    Blood pressure control to <130/80   Goal LDL <70-recommend high dose statins-    Serum glucose < 140   Call 911 for stroke any stroke symptoms   Follow-up in December with Carotid US  Caitlyn was seen today for cerebrovascular accident.    Diagnoses and all orders for this visit:    Cerebrovascular accident (CVA) due to thrombosis of left carotid artery  -     Duplex Carotid Ultrasound CAR; Future    Paroxysmal atrial fibrillation    Hyperlipidemia, unspecified hyperlipidemia type    Essential hypertension    Type 2 diabetes mellitus with other circulatory complication, unspecified long term insulin use status        Coding

## 2018-04-28 ENCOUNTER — APPOINTMENT (OUTPATIENT)
Dept: CARDIOLOGY | Facility: HOSPITAL | Age: 83
End: 2018-04-28
Attending: EMERGENCY MEDICINE

## 2018-04-28 ENCOUNTER — HOSPITAL ENCOUNTER (OUTPATIENT)
Facility: HOSPITAL | Age: 83
Setting detail: OBSERVATION
LOS: 1 days | Discharge: HOME | End: 2018-04-29
Attending: EMERGENCY MEDICINE | Admitting: INTERNAL MEDICINE

## 2018-04-28 DIAGNOSIS — L03.115 CELLULITIS OF RIGHT LEG: Primary | ICD-10-CM

## 2018-04-28 PROBLEM — I48.0 PAF (PAROXYSMAL ATRIAL FIBRILLATION): Status: ACTIVE | Noted: 2017-12-07

## 2018-04-28 PROBLEM — Z79.01 CURRENT USE OF LONG TERM ANTICOAGULATION: Status: ACTIVE | Noted: 2018-04-28

## 2018-04-28 LAB
ALBUMIN SERPL-MCNC: 3.8 G/DL (ref 3.5–5.2)
ALBUMIN/GLOB SERPL: 1.5 G/DL
ALP SERPL-CCNC: 89 U/L (ref 39–117)
ALT SERPL W P-5'-P-CCNC: 15 U/L (ref 1–33)
ANION GAP SERPL CALCULATED.3IONS-SCNC: 15.7 MMOL/L
AST SERPL-CCNC: 20 U/L (ref 1–32)
BASOPHILS # BLD AUTO: 0.02 10*3/MM3 (ref 0–0.2)
BASOPHILS NFR BLD AUTO: 0.3 % (ref 0–1.5)
BH CV LOWER VASCULAR LEFT COMMON FEMORAL AUGMENT: NORMAL
BH CV LOWER VASCULAR LEFT COMMON FEMORAL COMPETENT: NORMAL
BH CV LOWER VASCULAR LEFT COMMON FEMORAL COMPRESS: NORMAL
BH CV LOWER VASCULAR LEFT COMMON FEMORAL PHASIC: NORMAL
BH CV LOWER VASCULAR LEFT COMMON FEMORAL SPONT: NORMAL
BH CV LOWER VASCULAR RIGHT COMMON FEMORAL AUGMENT: NORMAL
BH CV LOWER VASCULAR RIGHT COMMON FEMORAL COMPETENT: NORMAL
BH CV LOWER VASCULAR RIGHT COMMON FEMORAL COMPRESS: NORMAL
BH CV LOWER VASCULAR RIGHT COMMON FEMORAL PHASIC: NORMAL
BH CV LOWER VASCULAR RIGHT COMMON FEMORAL SPONT: NORMAL
BH CV LOWER VASCULAR RIGHT DISTAL FEMORAL COMPRESS: NORMAL
BH CV LOWER VASCULAR RIGHT GASTRONEMIUS COMPRESS: NORMAL
BH CV LOWER VASCULAR RIGHT GREATER SAPH AK COMPRESS: NORMAL
BH CV LOWER VASCULAR RIGHT GREATER SAPH BK COMPRESS: NORMAL
BH CV LOWER VASCULAR RIGHT LESSER SAPH COMPRESS: NORMAL
BH CV LOWER VASCULAR RIGHT MID FEMORAL AUGMENT: NORMAL
BH CV LOWER VASCULAR RIGHT MID FEMORAL COMPETENT: NORMAL
BH CV LOWER VASCULAR RIGHT MID FEMORAL COMPRESS: NORMAL
BH CV LOWER VASCULAR RIGHT MID FEMORAL PHASIC: NORMAL
BH CV LOWER VASCULAR RIGHT MID FEMORAL SPONT: NORMAL
BH CV LOWER VASCULAR RIGHT PERONEAL COMPRESS: NORMAL
BH CV LOWER VASCULAR RIGHT POPLITEAL AUGMENT: NORMAL
BH CV LOWER VASCULAR RIGHT POPLITEAL COMPETENT: NORMAL
BH CV LOWER VASCULAR RIGHT POPLITEAL COMPRESS: NORMAL
BH CV LOWER VASCULAR RIGHT POPLITEAL PHASIC: NORMAL
BH CV LOWER VASCULAR RIGHT POPLITEAL SPONT: NORMAL
BH CV LOWER VASCULAR RIGHT POSTERIOR TIBIAL COMPRESS: NORMAL
BH CV LOWER VASCULAR RIGHT PROXIMAL FEMORAL COMPRESS: NORMAL
BH CV LOWER VASCULAR RIGHT SAPHENOFEMORAL JUNCTION AUGMENT: NORMAL
BH CV LOWER VASCULAR RIGHT SAPHENOFEMORAL JUNCTION COMPETENT: NORMAL
BH CV LOWER VASCULAR RIGHT SAPHENOFEMORAL JUNCTION COMPRESS: NORMAL
BH CV LOWER VASCULAR RIGHT SAPHENOFEMORAL JUNCTION PHASIC: NORMAL
BH CV LOWER VASCULAR RIGHT SAPHENOFEMORAL JUNCTION SPONT: NORMAL
BILIRUB SERPL-MCNC: 0.7 MG/DL (ref 0.1–1.2)
BUN BLD-MCNC: 29 MG/DL (ref 8–23)
BUN/CREAT SERPL: 26.4 (ref 7–25)
CALCIUM SPEC-SCNC: 8.7 MG/DL (ref 8.6–10.5)
CHLORIDE SERPL-SCNC: 101 MMOL/L (ref 98–107)
CO2 SERPL-SCNC: 25.3 MMOL/L (ref 22–29)
CREAT BLD-MCNC: 1.1 MG/DL (ref 0.57–1)
CRP SERPL-MCNC: 4.91 MG/DL (ref 0–0.5)
DEPRECATED RDW RBC AUTO: 55.8 FL (ref 37–54)
EOSINOPHIL # BLD AUTO: 0.16 10*3/MM3 (ref 0–0.7)
EOSINOPHIL NFR BLD AUTO: 2.2 % (ref 0.3–6.2)
ERYTHROCYTE [DISTWIDTH] IN BLOOD BY AUTOMATED COUNT: 15.2 % (ref 11.7–13)
ERYTHROCYTE [SEDIMENTATION RATE] IN BLOOD: 36 MM/HR (ref 0–30)
GFR SERPL CREATININE-BSD FRML MDRD: 47 ML/MIN/1.73
GLOBULIN UR ELPH-MCNC: 2.6 GM/DL
GLUCOSE BLD-MCNC: 243 MG/DL (ref 65–99)
GLUCOSE BLDC GLUCOMTR-MCNC: 161 MG/DL (ref 70–130)
GLUCOSE BLDC GLUCOMTR-MCNC: 95 MG/DL (ref 70–130)
HBA1C MFR BLD: 7.1 % (ref 4.8–5.6)
HCT VFR BLD AUTO: 36.4 % (ref 35.6–45.5)
HGB BLD-MCNC: 11.3 G/DL (ref 11.9–15.5)
IMM GRANULOCYTES # BLD: 0.02 10*3/MM3 (ref 0–0.03)
IMM GRANULOCYTES NFR BLD: 0.3 % (ref 0–0.5)
INR PPP: 2.95 (ref 0.9–1.1)
LYMPHOCYTES # BLD AUTO: 2.9 10*3/MM3 (ref 0.9–4.8)
LYMPHOCYTES NFR BLD AUTO: 39.9 % (ref 19.6–45.3)
MCH RBC QN AUTO: 31 PG (ref 26.9–32)
MCHC RBC AUTO-ENTMCNC: 31 G/DL (ref 32.4–36.3)
MCV RBC AUTO: 100 FL (ref 80.5–98.2)
MONOCYTES # BLD AUTO: 0.2 10*3/MM3 (ref 0.2–1.2)
MONOCYTES NFR BLD AUTO: 2.8 % (ref 5–12)
NEUTROPHILS # BLD AUTO: 3.99 10*3/MM3 (ref 1.9–8.1)
NEUTROPHILS NFR BLD AUTO: 54.8 % (ref 42.7–76)
PLATELET # BLD AUTO: 163 10*3/MM3 (ref 140–500)
PMV BLD AUTO: 10.7 FL (ref 6–12)
POTASSIUM BLD-SCNC: 4.1 MMOL/L (ref 3.5–5.2)
PROT SERPL-MCNC: 6.4 G/DL (ref 6–8.5)
PROTHROMBIN TIME: 30.2 SECONDS (ref 11.7–14.2)
RBC # BLD AUTO: 3.64 10*6/MM3 (ref 3.9–5.2)
SODIUM BLD-SCNC: 142 MMOL/L (ref 136–145)
WBC NRBC COR # BLD: 7.27 10*3/MM3 (ref 4.5–10.7)

## 2018-04-28 PROCEDURE — 87040 BLOOD CULTURE FOR BACTERIA: CPT | Performed by: EMERGENCY MEDICINE

## 2018-04-28 PROCEDURE — 85025 COMPLETE CBC W/AUTO DIFF WBC: CPT | Performed by: EMERGENCY MEDICINE

## 2018-04-28 PROCEDURE — 93971 EXTREMITY STUDY: CPT

## 2018-04-28 PROCEDURE — G0378 HOSPITAL OBSERVATION PER HR: HCPCS

## 2018-04-28 PROCEDURE — 82962 GLUCOSE BLOOD TEST: CPT

## 2018-04-28 PROCEDURE — 25010000002 VANCOMYCIN 10 G RECONSTITUTED SOLUTION: Performed by: EMERGENCY MEDICINE

## 2018-04-28 PROCEDURE — 85610 PROTHROMBIN TIME: CPT | Performed by: EMERGENCY MEDICINE

## 2018-04-28 PROCEDURE — 83036 HEMOGLOBIN GLYCOSYLATED A1C: CPT | Performed by: INTERNAL MEDICINE

## 2018-04-28 PROCEDURE — 99223 1ST HOSP IP/OBS HIGH 75: CPT | Performed by: INTERNAL MEDICINE

## 2018-04-28 PROCEDURE — 86140 C-REACTIVE PROTEIN: CPT | Performed by: INTERNAL MEDICINE

## 2018-04-28 PROCEDURE — 99284 EMERGENCY DEPT VISIT MOD MDM: CPT

## 2018-04-28 PROCEDURE — 80053 COMPREHEN METABOLIC PANEL: CPT | Performed by: EMERGENCY MEDICINE

## 2018-04-28 PROCEDURE — 96365 THER/PROPH/DIAG IV INF INIT: CPT

## 2018-04-28 PROCEDURE — 85652 RBC SED RATE AUTOMATED: CPT | Performed by: INTERNAL MEDICINE

## 2018-04-28 RX ORDER — DEXTROSE MONOHYDRATE 25 G/50ML
25 INJECTION, SOLUTION INTRAVENOUS
Status: DISCONTINUED | OUTPATIENT
Start: 2018-04-28 | End: 2018-04-29 | Stop reason: HOSPADM

## 2018-04-28 RX ORDER — PANTOPRAZOLE SODIUM 40 MG/1
40 TABLET, DELAYED RELEASE ORAL 2 TIMES DAILY WITH MEALS
Status: DISCONTINUED | OUTPATIENT
Start: 2018-04-28 | End: 2018-04-29 | Stop reason: HOSPADM

## 2018-04-28 RX ORDER — BUMETANIDE 1 MG/1
1 TABLET ORAL EVERY MORNING
Status: DISCONTINUED | OUTPATIENT
Start: 2018-04-29 | End: 2018-04-29 | Stop reason: HOSPADM

## 2018-04-28 RX ORDER — HYDROCODONE BITARTRATE AND ACETAMINOPHEN 10; 325 MG/1; MG/1
1 TABLET ORAL EVERY 4 HOURS PRN
Status: DISCONTINUED | OUTPATIENT
Start: 2018-04-28 | End: 2018-04-29 | Stop reason: HOSPADM

## 2018-04-28 RX ORDER — AMMONIUM LACTATE 120 MG/G
CREAM TOPICAL EVERY 12 HOURS
Status: DISCONTINUED | OUTPATIENT
Start: 2018-04-29 | End: 2018-04-29 | Stop reason: HOSPADM

## 2018-04-28 RX ORDER — OXAZEPAM 10 MG
10 CAPSULE ORAL NIGHTLY
Status: DISCONTINUED | OUTPATIENT
Start: 2018-04-28 | End: 2018-04-29 | Stop reason: HOSPADM

## 2018-04-28 RX ORDER — BISACODYL 5 MG/1
5 TABLET, DELAYED RELEASE ORAL DAILY PRN
Status: DISCONTINUED | OUTPATIENT
Start: 2018-04-28 | End: 2018-04-29 | Stop reason: HOSPADM

## 2018-04-28 RX ORDER — GLIPIZIDE 5 MG/1
5 TABLET ORAL EVERY MORNING
Status: DISCONTINUED | OUTPATIENT
Start: 2018-04-29 | End: 2018-04-29 | Stop reason: HOSPADM

## 2018-04-28 RX ORDER — OXYBUTYNIN CHLORIDE 10 MG/1
10 TABLET, EXTENDED RELEASE ORAL DAILY
Status: DISCONTINUED | OUTPATIENT
Start: 2018-04-28 | End: 2018-04-29 | Stop reason: HOSPADM

## 2018-04-28 RX ORDER — LOSARTAN POTASSIUM 25 MG/1
25 TABLET ORAL DAILY
Status: DISCONTINUED | OUTPATIENT
Start: 2018-04-29 | End: 2018-04-29 | Stop reason: HOSPADM

## 2018-04-28 RX ORDER — FAMOTIDINE 40 MG/1
40 TABLET, FILM COATED ORAL DAILY
Status: DISCONTINUED | OUTPATIENT
Start: 2018-04-28 | End: 2018-04-29 | Stop reason: HOSPADM

## 2018-04-28 RX ORDER — ONDANSETRON 2 MG/ML
4 INJECTION INTRAMUSCULAR; INTRAVENOUS EVERY 6 HOURS PRN
Status: DISCONTINUED | OUTPATIENT
Start: 2018-04-28 | End: 2018-04-29 | Stop reason: HOSPADM

## 2018-04-28 RX ORDER — ONDANSETRON 4 MG/1
4 TABLET, FILM COATED ORAL EVERY 6 HOURS PRN
Status: DISCONTINUED | OUTPATIENT
Start: 2018-04-28 | End: 2018-04-29 | Stop reason: HOSPADM

## 2018-04-28 RX ORDER — ROSUVASTATIN CALCIUM 20 MG/1
20 TABLET, COATED ORAL NIGHTLY
Status: DISCONTINUED | OUTPATIENT
Start: 2018-04-28 | End: 2018-04-29 | Stop reason: HOSPADM

## 2018-04-28 RX ORDER — CEPHALEXIN 500 MG/1
500 CAPSULE ORAL EVERY 8 HOURS SCHEDULED
Status: DISCONTINUED | OUTPATIENT
Start: 2018-04-29 | End: 2018-04-29 | Stop reason: HOSPADM

## 2018-04-28 RX ORDER — ASPIRIN 81 MG/1
81 TABLET, CHEWABLE ORAL DAILY
Status: DISCONTINUED | OUTPATIENT
Start: 2018-04-29 | End: 2018-04-29 | Stop reason: HOSPADM

## 2018-04-28 RX ORDER — NICOTINE POLACRILEX 4 MG
15 LOZENGE BUCCAL
Status: DISCONTINUED | OUTPATIENT
Start: 2018-04-28 | End: 2018-04-29 | Stop reason: HOSPADM

## 2018-04-28 RX ORDER — HYDROCODONE BITARTRATE AND ACETAMINOPHEN 5; 325 MG/1; MG/1
1 TABLET ORAL EVERY 4 HOURS PRN
Status: DISCONTINUED | OUTPATIENT
Start: 2018-04-28 | End: 2018-04-29 | Stop reason: HOSPADM

## 2018-04-28 RX ORDER — ONDANSETRON 4 MG/1
4 TABLET, ORALLY DISINTEGRATING ORAL EVERY 6 HOURS PRN
Status: DISCONTINUED | OUTPATIENT
Start: 2018-04-28 | End: 2018-04-29 | Stop reason: HOSPADM

## 2018-04-28 RX ORDER — SODIUM CHLORIDE 0.9 % (FLUSH) 0.9 %
10 SYRINGE (ML) INJECTION AS NEEDED
Status: DISCONTINUED | OUTPATIENT
Start: 2018-04-28 | End: 2018-04-29 | Stop reason: HOSPADM

## 2018-04-28 RX ORDER — WARFARIN SODIUM 4 MG/1
4 TABLET ORAL DAILY
Status: DISCONTINUED | OUTPATIENT
Start: 2018-04-28 | End: 2018-04-29 | Stop reason: HOSPADM

## 2018-04-28 RX ORDER — AMITRIPTYLINE HYDROCHLORIDE 25 MG/1
25 TABLET, FILM COATED ORAL NIGHTLY
Status: DISCONTINUED | OUTPATIENT
Start: 2018-04-28 | End: 2018-04-28

## 2018-04-28 RX ORDER — L.ACID,PARA/B.BIFIDUM/S.THERM 8B CELL
1 CAPSULE ORAL DAILY
Status: DISCONTINUED | OUTPATIENT
Start: 2018-04-29 | End: 2018-04-29 | Stop reason: HOSPADM

## 2018-04-28 RX ORDER — CARVEDILOL 3.12 MG/1
3.12 TABLET ORAL 2 TIMES DAILY
Status: DISCONTINUED | OUTPATIENT
Start: 2018-04-28 | End: 2018-04-29 | Stop reason: HOSPADM

## 2018-04-28 RX ORDER — ALUMINA, MAGNESIA, AND SIMETHICONE 2400; 2400; 240 MG/30ML; MG/30ML; MG/30ML
15 SUSPENSION ORAL EVERY 6 HOURS PRN
Status: DISCONTINUED | OUTPATIENT
Start: 2018-04-28 | End: 2018-04-29 | Stop reason: HOSPADM

## 2018-04-28 RX ORDER — ACETAMINOPHEN 325 MG/1
650 TABLET ORAL EVERY 4 HOURS PRN
Status: DISCONTINUED | OUTPATIENT
Start: 2018-04-28 | End: 2018-04-29 | Stop reason: HOSPADM

## 2018-04-28 RX ADMIN — PANTOPRAZOLE SODIUM 40 MG: 40 TABLET, DELAYED RELEASE ORAL at 18:56

## 2018-04-28 RX ADMIN — OXYBUTYNIN CHLORIDE 10 MG: 10 TABLET, EXTENDED RELEASE ORAL at 18:56

## 2018-04-28 RX ADMIN — OXAZEPAM 10 MG: 10 CAPSULE, GELATIN COATED ORAL at 21:47

## 2018-04-28 RX ADMIN — VANCOMYCIN HYDROCHLORIDE 2000 MG: 10 INJECTION, POWDER, LYOPHILIZED, FOR SOLUTION INTRAVENOUS at 13:51

## 2018-04-28 RX ADMIN — CARVEDILOL 3.12 MG: 3.12 TABLET, FILM COATED ORAL at 21:47

## 2018-04-28 RX ADMIN — WARFARIN SODIUM 4 MG: 4 TABLET ORAL at 18:56

## 2018-04-28 RX ADMIN — ROSUVASTATIN CALCIUM 20 MG: 20 TABLET, FILM COATED ORAL at 21:47

## 2018-04-28 RX ADMIN — FAMOTIDINE 40 MG: 40 TABLET, FILM COATED ORAL at 18:55

## 2018-04-29 VITALS
RESPIRATION RATE: 18 BRPM | BODY MASS INDEX: 37.07 KG/M2 | HEIGHT: 65 IN | OXYGEN SATURATION: 95 % | TEMPERATURE: 97.6 F | SYSTOLIC BLOOD PRESSURE: 107 MMHG | HEART RATE: 81 BPM | DIASTOLIC BLOOD PRESSURE: 69 MMHG | WEIGHT: 222.5 LBS

## 2018-04-29 PROBLEM — I87.321 STASIS DERMATITIS OF RIGHT LOWER EXTREMITY DUE TO PERIPHERAL VENOUS HYPERTENSION: Status: ACTIVE | Noted: 2018-04-28

## 2018-04-29 LAB
ANION GAP SERPL CALCULATED.3IONS-SCNC: 11.4 MMOL/L
BASOPHILS # BLD AUTO: 0.02 10*3/MM3 (ref 0–0.2)
BASOPHILS NFR BLD AUTO: 0.3 % (ref 0–1.5)
BUN BLD-MCNC: 21 MG/DL (ref 8–23)
BUN/CREAT SERPL: 21.9 (ref 7–25)
CALCIUM SPEC-SCNC: 8.1 MG/DL (ref 8.6–10.5)
CHLORIDE SERPL-SCNC: 103 MMOL/L (ref 98–107)
CO2 SERPL-SCNC: 26.6 MMOL/L (ref 22–29)
CREAT BLD-MCNC: 0.96 MG/DL (ref 0.57–1)
DEPRECATED RDW RBC AUTO: 56.1 FL (ref 37–54)
EOSINOPHIL # BLD AUTO: 0.28 10*3/MM3 (ref 0–0.7)
EOSINOPHIL NFR BLD AUTO: 4.3 % (ref 0.3–6.2)
ERYTHROCYTE [DISTWIDTH] IN BLOOD BY AUTOMATED COUNT: 15.3 % (ref 11.7–13)
GFR SERPL CREATININE-BSD FRML MDRD: 56 ML/MIN/1.73
GLUCOSE BLD-MCNC: 103 MG/DL (ref 65–99)
GLUCOSE BLDC GLUCOMTR-MCNC: 106 MG/DL (ref 70–130)
GLUCOSE BLDC GLUCOMTR-MCNC: 124 MG/DL (ref 70–130)
HCT VFR BLD AUTO: 33.2 % (ref 35.6–45.5)
HGB BLD-MCNC: 10.3 G/DL (ref 11.9–15.5)
IMM GRANULOCYTES # BLD: 0.01 10*3/MM3 (ref 0–0.03)
IMM GRANULOCYTES NFR BLD: 0.2 % (ref 0–0.5)
INR PPP: 2.66 (ref 0.9–1.1)
LYMPHOCYTES # BLD AUTO: 2.51 10*3/MM3 (ref 0.9–4.8)
LYMPHOCYTES NFR BLD AUTO: 38.4 % (ref 19.6–45.3)
MCH RBC QN AUTO: 30.8 PG (ref 26.9–32)
MCHC RBC AUTO-ENTMCNC: 31 G/DL (ref 32.4–36.3)
MCV RBC AUTO: 99.4 FL (ref 80.5–98.2)
MONOCYTES # BLD AUTO: 0.3 10*3/MM3 (ref 0.2–1.2)
MONOCYTES NFR BLD AUTO: 4.6 % (ref 5–12)
NEUTROPHILS # BLD AUTO: 3.43 10*3/MM3 (ref 1.9–8.1)
NEUTROPHILS NFR BLD AUTO: 52.4 % (ref 42.7–76)
PLATELET # BLD AUTO: 153 10*3/MM3 (ref 140–500)
PMV BLD AUTO: 10.2 FL (ref 6–12)
POTASSIUM BLD-SCNC: 3.9 MMOL/L (ref 3.5–5.2)
PROTHROMBIN TIME: 27.9 SECONDS (ref 11.7–14.2)
RBC # BLD AUTO: 3.34 10*6/MM3 (ref 3.9–5.2)
SODIUM BLD-SCNC: 141 MMOL/L (ref 136–145)
WBC NRBC COR # BLD: 6.54 10*3/MM3 (ref 4.5–10.7)

## 2018-04-29 PROCEDURE — G0378 HOSPITAL OBSERVATION PER HR: HCPCS

## 2018-04-29 PROCEDURE — G8978 MOBILITY CURRENT STATUS: HCPCS

## 2018-04-29 PROCEDURE — 82962 GLUCOSE BLOOD TEST: CPT

## 2018-04-29 PROCEDURE — 99232 SBSQ HOSP IP/OBS MODERATE 35: CPT | Performed by: INTERNAL MEDICINE

## 2018-04-29 PROCEDURE — 97162 PT EVAL MOD COMPLEX 30 MIN: CPT

## 2018-04-29 PROCEDURE — G8979 MOBILITY GOAL STATUS: HCPCS

## 2018-04-29 PROCEDURE — 36415 COLL VENOUS BLD VENIPUNCTURE: CPT | Performed by: INTERNAL MEDICINE

## 2018-04-29 PROCEDURE — 85025 COMPLETE CBC W/AUTO DIFF WBC: CPT | Performed by: INTERNAL MEDICINE

## 2018-04-29 PROCEDURE — 85610 PROTHROMBIN TIME: CPT | Performed by: INTERNAL MEDICINE

## 2018-04-29 PROCEDURE — 25010000003 CEFTRIAXONE PER 250 MG: Performed by: INTERNAL MEDICINE

## 2018-04-29 PROCEDURE — G8980 MOBILITY D/C STATUS: HCPCS

## 2018-04-29 PROCEDURE — 80048 BASIC METABOLIC PNL TOTAL CA: CPT | Performed by: INTERNAL MEDICINE

## 2018-04-29 RX ORDER — CEFTRIAXONE SODIUM 2 G/50ML
2 INJECTION, SOLUTION INTRAVENOUS ONCE
Status: COMPLETED | OUTPATIENT
Start: 2018-04-29 | End: 2018-04-29

## 2018-04-29 RX ADMIN — OXYBUTYNIN CHLORIDE 10 MG: 10 TABLET, EXTENDED RELEASE ORAL at 08:25

## 2018-04-29 RX ADMIN — FAMOTIDINE 40 MG: 40 TABLET, FILM COATED ORAL at 08:25

## 2018-04-29 RX ADMIN — GLIPIZIDE 5 MG: 5 TABLET ORAL at 08:25

## 2018-04-29 RX ADMIN — AMMONIUM LACTATE: 120 CREAM TOPICAL at 12:35

## 2018-04-29 RX ADMIN — PANTOPRAZOLE SODIUM 40 MG: 40 TABLET, DELAYED RELEASE ORAL at 08:29

## 2018-04-29 RX ADMIN — BUMETANIDE 1 MG: 1 TABLET ORAL at 06:38

## 2018-04-29 RX ADMIN — ASPIRIN 81 MG: 81 TABLET, CHEWABLE ORAL at 08:25

## 2018-04-29 RX ADMIN — HYDROCODONE BITARTRATE AND ACETAMINOPHEN 1 TABLET: 5; 325 TABLET ORAL at 12:35

## 2018-04-29 RX ADMIN — LOSARTAN POTASSIUM 25 MG: 25 TABLET ORAL at 08:25

## 2018-04-29 RX ADMIN — CARVEDILOL 3.12 MG: 3.12 TABLET, FILM COATED ORAL at 08:26

## 2018-04-29 RX ADMIN — Medication 1 CAPSULE: at 08:25

## 2018-04-29 RX ADMIN — HYDROCODONE BITARTRATE AND ACETAMINOPHEN 1 TABLET: 5; 325 TABLET ORAL at 06:43

## 2018-04-29 RX ADMIN — CEFTRIAXONE SODIUM 2 G: 2 INJECTION, SOLUTION INTRAVENOUS at 12:35

## 2018-04-29 RX ADMIN — CEPHALEXIN 500 MG: 500 CAPSULE ORAL at 06:43

## 2018-04-29 RX ADMIN — HYDROCODONE BITARTRATE AND ACETAMINOPHEN 1 TABLET: 5; 325 TABLET ORAL at 00:05

## 2018-04-29 RX ADMIN — AMMONIUM LACTATE: 120 CREAM TOPICAL at 00:05

## 2018-05-01 ENCOUNTER — HOSPITAL ENCOUNTER (OUTPATIENT)
Dept: CARDIOLOGY | Facility: HOSPITAL | Age: 83
Setting detail: RECURRING SERIES
Discharge: HOME | End: 2018-05-01

## 2018-05-01 PROCEDURE — 36416 COLLJ CAPILLARY BLOOD SPEC: CPT

## 2018-05-01 PROCEDURE — 85610 PROTHROMBIN TIME: CPT

## 2018-05-03 LAB
BACTERIA SPEC AEROBE CULT: NORMAL
BACTERIA SPEC AEROBE CULT: NORMAL

## 2018-05-15 ENCOUNTER — HOSPITAL ENCOUNTER (OUTPATIENT)
Dept: CARDIOLOGY | Facility: HOSPITAL | Age: 83
Setting detail: RECURRING SERIES
Discharge: HOME | End: 2018-05-15

## 2018-05-15 PROCEDURE — 36416 COLLJ CAPILLARY BLOOD SPEC: CPT

## 2018-05-15 PROCEDURE — 85610 PROTHROMBIN TIME: CPT

## 2018-05-18 ENCOUNTER — OFFICE (OUTPATIENT)
Dept: URBAN - METROPOLITAN AREA CLINIC 65 | Facility: CLINIC | Age: 83
End: 2018-05-18
Payer: COMMERCIAL

## 2018-05-18 VITALS — SYSTOLIC BLOOD PRESSURE: 120 MMHG | HEIGHT: 65 IN | DIASTOLIC BLOOD PRESSURE: 78 MMHG | WEIGHT: 223 LBS

## 2018-05-18 DIAGNOSIS — R15.9 FULL INCONTINENCE OF FECES: ICD-10-CM

## 2018-05-18 DIAGNOSIS — K57.30 DIVERTICULOSIS OF LARGE INTESTINE WITHOUT PERFORATION OR ABS: ICD-10-CM

## 2018-05-18 DIAGNOSIS — K21.9 GASTRO-ESOPHAGEAL REFLUX DISEASE WITHOUT ESOPHAGITIS: ICD-10-CM

## 2018-05-18 DIAGNOSIS — K31.84 GASTROPARESIS: ICD-10-CM

## 2018-05-18 DIAGNOSIS — Z86.010 PERSONAL HISTORY OF COLONIC POLYPS: ICD-10-CM

## 2018-05-18 PROCEDURE — 99212 OFFICE O/P EST SF 10 MIN: CPT | Performed by: INTERNAL MEDICINE

## 2018-05-22 ENCOUNTER — HOSPITAL ENCOUNTER (OUTPATIENT)
Dept: CARDIOLOGY | Facility: HOSPITAL | Age: 83
Discharge: HOME | End: 2018-05-22

## 2018-05-22 ENCOUNTER — OFFICE VISIT (OUTPATIENT)
Dept: CARDIOLOGY | Facility: CLINIC | Age: 83
End: 2018-05-22

## 2018-05-22 ENCOUNTER — HOSPITAL ENCOUNTER (OUTPATIENT)
Dept: CARDIOLOGY | Facility: HOSPITAL | Age: 83
Discharge: HOME | End: 2018-05-22
Admitting: NURSE PRACTITIONER

## 2018-05-22 ENCOUNTER — HOSPITAL ENCOUNTER (OUTPATIENT)
Dept: CARDIOLOGY | Facility: HOSPITAL | Age: 83
Setting detail: RECURRING SERIES
Discharge: HOME | End: 2018-05-22

## 2018-05-22 VITALS
DIASTOLIC BLOOD PRESSURE: 90 MMHG | SYSTOLIC BLOOD PRESSURE: 130 MMHG | HEART RATE: 82 BPM | WEIGHT: 222 LBS | RESPIRATION RATE: 24 BRPM | BODY MASS INDEX: 36.99 KG/M2 | HEIGHT: 65 IN

## 2018-05-22 DIAGNOSIS — I25.10 CORONARY ARTERY DISEASE INVOLVING NATIVE CORONARY ARTERY OF NATIVE HEART WITHOUT ANGINA PECTORIS: ICD-10-CM

## 2018-05-22 DIAGNOSIS — Z95.0 PACEMAKER: ICD-10-CM

## 2018-05-22 DIAGNOSIS — E11.59 TYPE 2 DIABETES MELLITUS WITH OTHER CIRCULATORY COMPLICATION, WITHOUT LONG-TERM CURRENT USE OF INSULIN (HCC): ICD-10-CM

## 2018-05-22 DIAGNOSIS — I25.5 CARDIOMYOPATHY, ISCHEMIC: ICD-10-CM

## 2018-05-22 DIAGNOSIS — I48.0 PAROXYSMAL ATRIAL FIBRILLATION (HCC): Primary | ICD-10-CM

## 2018-05-22 DIAGNOSIS — I50.43 ACUTE ON CHRONIC COMBINED SYSTOLIC AND DIASTOLIC CONGESTIVE HEART FAILURE (HCC): ICD-10-CM

## 2018-05-22 PROCEDURE — 99214 OFFICE O/P EST MOD 30 MIN: CPT | Performed by: INTERNAL MEDICINE

## 2018-05-22 PROCEDURE — 36416 COLLJ CAPILLARY BLOOD SPEC: CPT

## 2018-05-22 PROCEDURE — 93000 ELECTROCARDIOGRAM COMPLETE: CPT | Performed by: INTERNAL MEDICINE

## 2018-05-22 PROCEDURE — 85610 PROTHROMBIN TIME: CPT

## 2018-05-22 PROCEDURE — 25010000002 PERFLUTREN (DEFINITY) 8.476 MG IN SODIUM CHLORIDE 0.9 % 10 ML INJECTION: Performed by: INTERNAL MEDICINE

## 2018-05-22 PROCEDURE — 93306 TTE W/DOPPLER COMPLETE: CPT | Performed by: INTERNAL MEDICINE

## 2018-05-22 PROCEDURE — 93306 TTE W/DOPPLER COMPLETE: CPT

## 2018-05-22 RX ADMIN — PERFLUTREN 1.5 ML: 6.52 INJECTION, SUSPENSION INTRAVENOUS at 09:22

## 2018-05-22 NOTE — PROGRESS NOTES
Date of Office Visit: 18  Encounter Provider: Rommel Veliz MD  Place of Service: [facility] CARDIOLOGY  Patient Name: Caitlyn Longoria  :1934  Referral Provider:Referring, Self      Chief Complaint   Patient presents with   • Atrial Fibrillation   • Coronary Artery Disease   • Congestive Heart Failure     History of Present Illness  The patient is a pleasant 83-year-old white female with a history of diabetes and  hypertension who presented in 2008 with chest discomfort. In 2007, it  looked like she had had an anterior infarct. Echocardiogram then confirmed that. She then  had cardiac catheterization on 2008, which confirmed a left ventricular  ejection fraction of 35%, total occlusion of the mid left anterior descending, severe  disease of the large diagonal, and insignificant disease of the right coronary artery and  circumflex. She was admitted and had outpatient angioplasty and drug-eluting stent  placement of the diagonal. Unfortunately, she developed a right femoral pseudoaneurysm and  had to have that surgically repaired. Follow-up echocardiogram in  showed her left  ventricular function had returned to normal. She then presented on 2011, with  increasing shortness of breath and was found to have some mild congestive heart failure,  atrial flutter, and marked bradycardia. She had a repeat echocardiogram again that  confirmed normal left ventricular systolic function. She had a dual-chamber pacemaker  implanted. She continued to have episodes of atrial flutter with symptomatic dyspnea. She  underwent atrial flutter ablation in 2011.  She then presented in 2012 with dyspnea, thought that this might be heart  failure, anginal equivalence. She underwent cardiac catheterization which showed elevated  right-sided pressure with a PA systolic pressure of 53 mmHg, mean of 31 mmHg, but elevated  wedge pressure  at 26. She was also found to have in-stent stenosis of the diagonal vessel.  Other vessels were free of disease with the exception of total occlusion of the mid left  anterior descending which was unchanged. A left ventricular ejection fraction of  approximately 50%. She underwent angioplasty and drug-eluting stent placement of the  diagonal vessel. Prinivil was increased. We increased her Lasix to 30 mg a day however,  she developed dizziness on that and we had to cut her back to 20 mg of Lasix a day.  She unfortunately has had a couple of episodes of passing out. It turns out that was from low   blood pressure, and they resolved with adjusting her medications.  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 She ended up being admitted and had the device removed and cleaned  out. During that hospital stay she was found to have a nonfunctioning RV lead. She was  stable and in view of the infection, it was decided not to go and replace the lead at that  time.  She then in 10/2014 had her RV lead revised.   Then in November 2016 was having some increased shortness of breath had a perfusion stress test that showed a very small area of ischemia in the septal wall her echocardiogram showed a left ventricular ejection fraction of approximately 45% which was about her baseline no real significant valvular disease.  She isn't subsequently diagnosed as having significant gastroesophageal reflux with aspiration and been treated for that.    In December 2017 she was admitted to [facility] with a cerebral vascular accident she had an echocardiogram that confirmed severe left ventricular systolic dysfunction and ejection fraction of 23% no significant valvular disease aspirin was added to her regimen.  She's ended in our hospital with septic knees and infection treated with antibiotics and developed lymphedema of her lower extremity was seen in April  2018 emergency room for that she's now getting antibiotics 3 times a day following with infectious disease.  He did not really operate on her need for risk.  She now comes in for follow-up .  She's been doing reasonably well.  She is walking with a walker for balance reasons she's had no chest pain or pressure.  Denies any real complaint of shortness of breath no orthopnea or PND.  Gets dizziness if she gets up quickly.  She's been weighing daily and those of been very stable except for one day where she came 3 pounds some dietary indiscretion.  Her blood pressures of been controlled if anything are midline on the low side.  Overall she feels like she's doing well.      Atrial Fibrillation   Symptoms are negative for dizziness and weakness. Past medical history includes atrial fibrillation and CAD.   Coronary Artery Disease   Pertinent negatives include no dizziness, muscle weakness or weight gain. Her past medical history is significant for past myocardial infarction.         Past Medical History:   Diagnosis Date   • Aortic calcification     mild, on echo 12/17/2017   • Aortic regurgitation     Trace   • CAD (coronary artery disease)    • Chronic combined systolic and diastolic congestive heart failure    • CKD (chronic kidney disease) stage 3, GFR 30-59 ml/min    • Coronary artery disease involving native coronary artery of native heart with angina pectoris    • DM type 2 (diabetes mellitus, type 2)    • Hypertension    • Mild mitral regurgitation    • Mitral annular calcification     12/8/2017- echo, moderate   • PAF (paroxysmal atrial fibrillation)    • SSS (sick sinus syndrome)    • Tricuspid regurgitation     Trace         Past Surgical History:   Procedure Laterality Date   • CARDIAC CATHETERIZATION     • CHOLECYSTECTOMY     • CORONARY STENT PLACEMENT     • HERNIA REPAIR     • HYSTERECTOMY     • PACEMAKER IMPLANTATION     • REPLACEMENT TOTAL KNEE           Current Outpatient Prescriptions on File Prior to  Visit   Medication Sig Dispense Refill   • acetaminophen (TYLENOL) 500 MG tablet Take 500 mg by mouth Every 6 (Six) Hours As Needed for Mild Pain .     • aspirin 81 MG chewable tablet Chew 81 mg Daily.     • azelastine (ASTEPRO) 0.15 % solution nasal spray 2 sprays into each nostril 2 (Two) Times a Day.     • azelastine (OPTIVAR) 0.05 % ophthalmic solution Administer 1 drop to both eyes 2 (Two) Times a Day.     • baclofen (LIORESAL) 10 MG tablet Take 10 mg by mouth 3 (Three) Times a Day As Needed.     • Biotin 2.5 MG capsule Take 1 capsule by mouth Daily.     • bumetanide (BUMEX) 1 MG tablet Take 1 mg by mouth Every Morning.     • Calcium Carbonate-Vitamin D (CALCIUM-VITAMIN D) 500-200 MG-UNIT tablet per tablet TAKE TWO TABLETS BY MOUTH DAILY     • carvedilol (COREG) 3.125 MG tablet Take 1 tablet by mouth 2 (Two) Times a Day. 60 tablet 11   • cephalexin (KEFLEX) 500 MG capsule Take 500 mg by mouth.     • cetirizine (ZyrTEC) 10 MG tablet Take 10 mg by mouth Daily.     • clotrimazole-betamethasone (LOTRISONE) 1-0.05 % cream Apply 1 application topically 2 (Two) Times a Day As Needed.     • dry mouth gel (BIOTENE ORALBALANCE) gel Apply  to the mouth or throat As Needed for Dry Mouth.     • flunisolide (NASALIDE) 25 MCG/ACT (0.025%) solution nasal spray Inhale 2 sprays Every 12 (Twelve) Hours.     • glipiZIDE (GLUCOTROL) 5 MG tablet Take 5 mg by mouth Every Morning.     • ketoconazole (NIZORAL) 2 % shampoo Apply  topically 2 (Two) Times a Week.     • loperamide (IMODIUM) 2 MG capsule Take 2 mg by mouth Every Other Day.     • losartan (COZAAR) 25 MG tablet Take 25 mg by mouth Daily.     • Melatonin 3 MG tablet Take 3 mg by mouth Every Night.     • Omega-3 Fatty Acids (FISH OIL) 1000 MG capsule capsule Take 1,000 mg by mouth 2 (Two) Times a Day With Meals.     • oxazepam (SERAX) 10 MG capsule Take 1 capsule by mouth Every Night. 5 capsule 0   • oxybutynin XL (DITROPAN-XL) 10 MG 24 hr tablet Take 10 mg by mouth Daily.      • pantoprazole (PROTONIX) 40 MG EC tablet Take 40 mg by mouth 2 (Two) Times a Day With Meals.     • Probiotic Product (ALIGN PO) Take  by mouth Daily.     • rosuvastatin (CRESTOR) 20 MG tablet Take 20 mg by mouth Every Night.     • warfarin (COUMADIN) 4 MG tablet Take 4 mg by mouth Daily.     • [DISCONTINUED] ciclopirox (LOPROX) 0.77 % cream Apply 1 application topically Daily As Needed.     • [DISCONTINUED] raNITIdine (ZANTAC) 300 MG tablet Take 300 mg by mouth Every Night.       No current facility-administered medications on file prior to visit.          Social History     Social History   • Marital status: Single     Spouse name: N/A   • Number of children: N/A   • Years of education: N/A     Occupational History   • Not on file.     Social History Main Topics   • Smoking status: Never Smoker   • Smokeless tobacco: Never Used      Comment: caffeine use   • Alcohol use No   • Drug use: No   • Sexual activity: Not on file     Other Topics Concern   • Not on file     Social History Narrative   • No narrative on file       Family History   Problem Relation Age of Onset   • Heart disease Mother    • Hypertension Mother    • Heart disease Father    • Hypertension Father    • Hypertension Brother          Review of Systems   Constitution: Positive for malaise/fatigue. Negative for decreased appetite, diaphoresis, fever, weakness, weight gain and weight loss.   HENT: Negative for congestion, hearing loss, nosebleeds and tinnitus.    Eyes: Negative for blurred vision, double vision, vision loss in left eye, vision loss in right eye and visual disturbance.   Cardiovascular:        As noted in HPI   Respiratory:        As noted HPI   Endocrine: Negative for cold intolerance and heat intolerance.   Hematologic/Lymphatic: Negative for bleeding problem. Does not bruise/bleed easily.   Skin: Negative for color change, flushing, itching and rash.   Musculoskeletal: Negative for arthritis, back pain, joint pain, joint  "swelling, muscle weakness and myalgias.   Gastrointestinal: Negative for bloating, abdominal pain, constipation, diarrhea, dysphagia, heartburn, hematemesis, hematochezia, melena, nausea and vomiting.   Genitourinary: Negative for bladder incontinence, dysuria, frequency, nocturia and urgency.   Neurological: Negative for dizziness, focal weakness, headaches, light-headedness, loss of balance, numbness, paresthesias and vertigo.   Psychiatric/Behavioral: Negative for depression, memory loss and substance abuse.       Procedures      ECG 12 Lead  Date/Time: 5/22/2018 11:36 AM  Performed by: DIVYA NELSON  Authorized by: DIVYA NELSON   Rhythm: atrial fibrillation  Rhythm comments: Ventricular paced                  Objective:    /90 (BP Location: Right arm)   Pulse 82   Resp 24   Ht 165.1 cm (65\")   Wt 101 kg (222 lb)   BMI 36.94 kg/m²        Physical Exam  Physical Exam   Constitutional: She is oriented to person, place, and time. She appears well-developed and well-nourished. No distress.   HENT:   Head: Normocephalic.   Eyes: Conjunctivae are normal. Pupils are equal, round, and reactive to light. No scleral icterus.   Neck: Normal carotid pulses, no hepatojugular reflux and no JVD present. Carotid bruit is not present. No tracheal deviation, no edema and no erythema present. No thyromegaly present.   Cardiovascular: Normal rate, regular rhythm, S1 normal, S2 normal and intact distal pulses.   No extrasystoles are present. PMI is not displaced.  Exam reveals no gallop, no distant heart sounds and no friction rub.    Murmur heard.   Systolic murmur is present with a grade of 2/6  at the apex  Pulses:       Carotid pulses are 2+ on the right side, and 2+ on the left side.       Radial pulses are 2+ on the right side, and 2+ on the left side.        Femoral pulses are 2+ on the right side, and 2+ on the left side.       Dorsalis pedis pulses are 2+ on the right side, and 2+ on the left side.        " Posterior tibial pulses are 2+ on the right side, and 2+ on the left side.   Pulmonary/Chest: Effort normal and breath sounds normal. No respiratory distress. She has no decreased breath sounds. She has no wheezes. She has no rhonchi. She has no rales. She exhibits no tenderness.   Abdominal: Soft. Bowel sounds are normal. She exhibits no distension and no mass. There is no hepatosplenomegaly. There is no tenderness. There is no rebound and no guarding.   Musculoskeletal: She exhibits edema (1+ bilateral tibial). She exhibits no tenderness or deformity.   Neurological: She is alert and oriented to person, place, and time.   Skin: Skin is warm and dry. No rash noted. She is not diaphoretic. No cyanosis or erythema. No pallor. Nails show no clubbing.   Psychiatric: She has a normal mood and affect. Her speech is normal and behavior is normal. Judgment and thought content normal.           Assessment:   1. This is a 83-year-old female with history of coronary disease over the mid left anterior descending with total occlusion of that. First diagonal vessel with severe  disease status post angioplasty and drug-eluting stent placement of that. Followup catheterization in 10/12 showed restenosis of the diagonal vessel with repeat angioplasty  and drug-eluting stent placement of that. Left ventricular ejection fraction of approximately 50% with segmental wall motion abnormality but at the time of that catheterization did  have elevated pulmonary artery pressure as well as pulmonary capillary wedge pressure. Echocardiogram May 2018 left ventricular ejection fraction improved from 23% to 35% to 40%.  No evidence of heart failure.   Coronary Artery Disease  Assessment  • The patient has no angina  • On warfarin    Plan  • Lifestyle modifications discussed include adhering to a heart healthy diet, avoidance of tobacco products, maintenance of a healthy weight, medication compliance, regular exercise and regular monitoring of  cholesterol and blood pressure    Subjective - Objective  • There is a history of past MI  • There has been a previous stent procedure using LOLA  • Current antiplatelet therapy includes aspirin 81 mg    Stable.       2. Atrial fibrillation/sick sinus syndrome status post permanent pacemaker implantation status post flutter ablation. S/P RV lead revision.   Atrial Fibrillation and Atrial Flutter  Assessment  • The patient has persistent atrial fibrillation  • This is non-valvular in etiology  • The patient's CHADS2-VASc score is 9  • A [id number] score of 2 or more is considered a high risk for a thromboembolic event  • Warfarin prescribed    Plan  • Continue in atrial fibrillation with rate control  • Continue warfarin for antithrombotic therapy, bleeding issues discussed  • Continue beta blocker for rhythm control  And atrial fib flutter today continue the same rate controlled.      3. Diabetes mellitus, followed in your office.   4. Hypertensive. Blood pressure adequately controlled.   5. Hyperlipidemia. She is following with her PCP on target dose rosuvastatin.  6. History of sleep apnea, on CPAP.   7. History of pulmonary hypertension, follows with pulmonary.   8. Obesity as above.  9. Syncope. Secondary to hypotension. No recurrence.   10.  Marked dyspnea.  Most likely secondary to her paralyzed left hemidiaphragm reflux and aspiration as well as underlying lung and heart disease.    11.  Cerebral vascular accident most likely embolic from her atrial fibrillation now on aspirin.  She is having some issues with some bruising but again I think the risk of stopping aspirin or 2 great.  12.  Recurrent infection I believe in her knee replacement now receiving antibiotics following with orthopedics as well as infectious disease.  13.  Lower show me lymphedema apparently she is can be going lymphedema clinic.   14.  Ischemic cardiomyopathy left ventricular ejection fraction now 35-40% moderately reduced she is  weighing herself daily those are stable she's to continue the same.  She'll see me in 6 months and her nurse practitioner in 3.         Plan:

## 2018-05-23 LAB
ASCENDING AORTA: 2.7 CM
BH CV ECHO MEAS - ACS: 1.3 CM
BH CV ECHO MEAS - AO MAX PG (FULL): 5.5 MMHG
BH CV ECHO MEAS - AO MAX PG: 9.1 MMHG
BH CV ECHO MEAS - AO MEAN PG (FULL): 2.8 MMHG
BH CV ECHO MEAS - AO MEAN PG: 5 MMHG
BH CV ECHO MEAS - AO ROOT AREA (BSA CORRECTED): 1.7
BH CV ECHO MEAS - AO ROOT AREA: 10 CM^2
BH CV ECHO MEAS - AO ROOT DIAM: 3.6 CM
BH CV ECHO MEAS - AO V2 MAX: 150.5 CM/SEC
BH CV ECHO MEAS - AO V2 MEAN: 105.1 CM/SEC
BH CV ECHO MEAS - AO V2 VTI: 28.9 CM
BH CV ECHO MEAS - AVA(I,A): 2 CM^2
BH CV ECHO MEAS - AVA(I,D): 2 CM^2
BH CV ECHO MEAS - AVA(V,A): 2 CM^2
BH CV ECHO MEAS - AVA(V,D): 2 CM^2
BH CV ECHO MEAS - BSA(HAYCOCK): 2.2 M^2
BH CV ECHO MEAS - BSA: 2.1 M^2
BH CV ECHO MEAS - BZI_BMI: 36.9 KILOGRAMS/M^2
BH CV ECHO MEAS - BZI_METRIC_HEIGHT: 165.1 CM
BH CV ECHO MEAS - BZI_METRIC_WEIGHT: 100.7 KG
BH CV ECHO MEAS - CONTRAST EF (2CH): 47.4 ML/M^2
BH CV ECHO MEAS - CONTRAST EF 4CH: 49.6 ML/M^2
BH CV ECHO MEAS - EDV(MOD-SP2): 135 ML
BH CV ECHO MEAS - EDV(MOD-SP4): 139 ML
BH CV ECHO MEAS - EDV(TEICH): 67 ML
BH CV ECHO MEAS - EF(CUBED): 42.7 %
BH CV ECHO MEAS - EF(MOD-BP): 49 %
BH CV ECHO MEAS - EF(MOD-SP2): 47.4 %
BH CV ECHO MEAS - EF(MOD-SP4): 49.6 %
BH CV ECHO MEAS - EF(TEICH): 36 %
BH CV ECHO MEAS - ESV(MOD-SP2): 71 ML
BH CV ECHO MEAS - ESV(MOD-SP4): 70 ML
BH CV ECHO MEAS - ESV(TEICH): 42.9 ML
BH CV ECHO MEAS - FS: 16.9 %
BH CV ECHO MEAS - IVS/LVPW: 1.3
BH CV ECHO MEAS - IVSD: 1 CM
BH CV ECHO MEAS - LAT PEAK E' VEL: 12 CM/SEC
BH CV ECHO MEAS - LV DIASTOLIC VOL/BSA (35-75): 67.2 ML/M^2
BH CV ECHO MEAS - LV MASS(C)D: 106.2 GRAMS
BH CV ECHO MEAS - LV MASS(C)DI: 51.4 GRAMS/M^2
BH CV ECHO MEAS - LV MAX PG: 3.5 MMHG
BH CV ECHO MEAS - LV MEAN PG: 2.2 MMHG
BH CV ECHO MEAS - LV SYSTOLIC VOL/BSA (12-30): 33.9 ML/M^2
BH CV ECHO MEAS - LV V1 MAX: 93.8 CM/SEC
BH CV ECHO MEAS - LV V1 MEAN: 70.2 CM/SEC
BH CV ECHO MEAS - LV V1 VTI: 18 CM
BH CV ECHO MEAS - LVIDD: 3.9 CM
BH CV ECHO MEAS - LVIDS: 3.3 CM
BH CV ECHO MEAS - LVLD AP2: 7.4 CM
BH CV ECHO MEAS - LVLD AP4: 7.7 CM
BH CV ECHO MEAS - LVLS AP2: 6.1 CM
BH CV ECHO MEAS - LVLS AP4: 6.5 CM
BH CV ECHO MEAS - LVOT AREA (M): 3.1 CM^2
BH CV ECHO MEAS - LVOT AREA: 3.3 CM^2
BH CV ECHO MEAS - LVOT DIAM: 2 CM
BH CV ECHO MEAS - LVPWD: 0.8 CM
BH CV ECHO MEAS - MED PEAK E' VEL: 9 CM/SEC
BH CV ECHO MEAS - MR MAX PG: 37 MMHG
BH CV ECHO MEAS - MR MAX VEL: 304.1 CM/SEC
BH CV ECHO MEAS - MV A DUR: 0.09 SEC
BH CV ECHO MEAS - MV A MAX VEL: 39.4 CM/SEC
BH CV ECHO MEAS - MV DEC SLOPE: 615.1 CM/SEC^2
BH CV ECHO MEAS - MV DEC TIME: 0.18 SEC
BH CV ECHO MEAS - MV E MAX VEL: 109.7 CM/SEC
BH CV ECHO MEAS - MV E/A: 2.8
BH CV ECHO MEAS - MV MAX PG: 8.5 MMHG
BH CV ECHO MEAS - MV MEAN PG: 3.5 MMHG
BH CV ECHO MEAS - MV P1/2T MAX VEL: 109.3 CM/SEC
BH CV ECHO MEAS - MV P1/2T: 52 MSEC
BH CV ECHO MEAS - MV V2 MAX: 146 CM/SEC
BH CV ECHO MEAS - MV V2 MEAN: 86.4 CM/SEC
BH CV ECHO MEAS - MV V2 VTI: 27.4 CM
BH CV ECHO MEAS - MVA P1/2T LCG: 2 CM^2
BH CV ECHO MEAS - MVA(P1/2T): 4.2 CM^2
BH CV ECHO MEAS - MVA(VTI): 2.1 CM^2
BH CV ECHO MEAS - PA ACC TIME: 0.11 SEC
BH CV ECHO MEAS - PA MAX PG (FULL): 2.5 MMHG
BH CV ECHO MEAS - PA MAX PG: 3.7 MMHG
BH CV ECHO MEAS - PA PR(ACCEL): 31.5 MMHG
BH CV ECHO MEAS - PA V2 MAX: 95.6 CM/SEC
BH CV ECHO MEAS - PULM A REVS DUR: 0.08 SEC
BH CV ECHO MEAS - PULM A REVS VEL: 20.6 CM/SEC
BH CV ECHO MEAS - PULM DIAS VEL: 45 CM/SEC
BH CV ECHO MEAS - PULM S/D: 1.6
BH CV ECHO MEAS - PULM SYS VEL: 69.8 CM/SEC
BH CV ECHO MEAS - PVA(V,A): 2 CM^2
BH CV ECHO MEAS - PVA(V,D): 2 CM^2
BH CV ECHO MEAS - QP/QS: 0.56
BH CV ECHO MEAS - RAP SYSTOLE: 3 MMHG
BH CV ECHO MEAS - RV MAX PG: 0 MMHG
BH CV ECHO MEAS - RV MEAN PG: 0 MMHG
BH CV ECHO MEAS - RV V1 MAX: 53.6 CM/SEC
BH CV ECHO MEAS - RV V1 MEAN: 40.2 CM/SEC
BH CV ECHO MEAS - RV V1 VTI: 0 CM
BH CV ECHO MEAS - RVOT AREA: 3.5 CM^2
BH CV ECHO MEAS - RVOT DIAM: 2.1 CM
BH CV ECHO MEAS - RVSP: 37 MMHG
BH CV ECHO MEAS - SI(AO): 139.4 ML/M^2
BH CV ECHO MEAS - SI(CUBED): 12.5 ML/M^2
BH CV ECHO MEAS - SI(LVOT): 28.4 ML/M^2
BH CV ECHO MEAS - SI(MOD-SP2): 31 ML/M^2
BH CV ECHO MEAS - SI(MOD-SP4): 33.4 ML/M^2
BH CV ECHO MEAS - SI(TEICH): 11.7 ML/M^2
BH CV ECHO MEAS - SUP REN AO DIAM: 2.1 CM
BH CV ECHO MEAS - SV(AO): 288.2 ML
BH CV ECHO MEAS - SV(CUBED): 25.9 ML
BH CV ECHO MEAS - SV(LVOT): 58.8 ML
BH CV ECHO MEAS - SV(MOD-SP2): 64 ML
BH CV ECHO MEAS - SV(MOD-SP4): 69 ML
BH CV ECHO MEAS - SV(RVOT): 33 ML
BH CV ECHO MEAS - SV(TEICH): 24.1 ML
BH CV ECHO MEAS - TAPSE (>1.6): 2 CM2
BH CV ECHO MEAS - TR MAX VEL: 293 CM/SEC
BH CV ECHO MEASUREMENTS AVERAGE E/E' RATIO: 10.45
BH CV XLRA - RV BASE: 3.2 CM
BH CV XLRA - TDI S': 13 CM/SEC
LEFT ATRIUM VOLUME INDEX: 46 ML/M2
MAXIMAL PREDICTED HEART RATE: 137 BPM
SINUS: 3.5 CM
STJ: 2.8 CM
STRESS TARGET HR: 116 BPM

## 2018-05-23 RX ORDER — LOSARTAN POTASSIUM 25 MG/1
TABLET ORAL
Qty: 90 TABLET | Refills: 1 | Status: SHIPPED | OUTPATIENT
Start: 2018-05-23 | End: 2018-11-12 | Stop reason: SDUPTHER

## 2018-06-05 ENCOUNTER — HOSPITAL ENCOUNTER (OUTPATIENT)
Dept: CARDIOLOGY | Facility: HOSPITAL | Age: 83
Setting detail: RECURRING SERIES
Discharge: HOME | End: 2018-06-05

## 2018-06-05 PROCEDURE — 85610 PROTHROMBIN TIME: CPT

## 2018-06-05 PROCEDURE — 36416 COLLJ CAPILLARY BLOOD SPEC: CPT

## 2018-06-15 RX ORDER — WARFARIN SODIUM 4 MG/1
TABLET ORAL
Qty: 90 TABLET | Refills: 0 | Status: SHIPPED | OUTPATIENT
Start: 2018-06-15 | End: 2018-10-01 | Stop reason: SDUPTHER

## 2018-06-19 ENCOUNTER — HOSPITAL ENCOUNTER (OUTPATIENT)
Dept: CARDIOLOGY | Facility: HOSPITAL | Age: 83
Setting detail: RECURRING SERIES
Discharge: HOME | End: 2018-06-19

## 2018-06-19 PROCEDURE — 85610 PROTHROMBIN TIME: CPT

## 2018-06-19 PROCEDURE — 36416 COLLJ CAPILLARY BLOOD SPEC: CPT

## 2018-06-26 ENCOUNTER — HOSPITAL ENCOUNTER (OUTPATIENT)
Dept: CARDIOLOGY | Facility: HOSPITAL | Age: 83
Setting detail: RECURRING SERIES
Discharge: HOME | End: 2018-06-26

## 2018-06-26 PROCEDURE — 85610 PROTHROMBIN TIME: CPT

## 2018-06-26 PROCEDURE — 36416 COLLJ CAPILLARY BLOOD SPEC: CPT

## 2018-07-03 ENCOUNTER — HOSPITAL ENCOUNTER (OUTPATIENT)
Dept: CARDIOLOGY | Facility: HOSPITAL | Age: 83
Setting detail: RECURRING SERIES
Discharge: HOME | End: 2018-07-03

## 2018-07-03 PROCEDURE — 85610 PROTHROMBIN TIME: CPT

## 2018-07-03 PROCEDURE — 36416 COLLJ CAPILLARY BLOOD SPEC: CPT

## 2018-07-08 ENCOUNTER — CLINICAL SUPPORT NO REQUIREMENTS (OUTPATIENT)
Dept: CARDIOLOGY | Facility: CLINIC | Age: 83
End: 2018-07-08

## 2018-07-08 DIAGNOSIS — I49.5 SICK SINUS SYNDROME (HCC): Primary | ICD-10-CM

## 2018-07-08 DIAGNOSIS — I48.19 PERSISTENT ATRIAL FIBRILLATION (HCC): ICD-10-CM

## 2018-07-08 PROCEDURE — 93296 REM INTERROG EVL PM/IDS: CPT | Performed by: INTERNAL MEDICINE

## 2018-07-08 PROCEDURE — 93294 REM INTERROG EVL PM/LDLS PM: CPT | Performed by: INTERNAL MEDICINE

## 2018-07-17 ENCOUNTER — HOSPITAL ENCOUNTER (OUTPATIENT)
Dept: CARDIOLOGY | Facility: HOSPITAL | Age: 83
Setting detail: RECURRING SERIES
Discharge: HOME | End: 2018-07-17

## 2018-07-17 PROCEDURE — 36416 COLLJ CAPILLARY BLOOD SPEC: CPT

## 2018-07-17 PROCEDURE — 85610 PROTHROMBIN TIME: CPT

## 2018-08-13 ENCOUNTER — HOSPITAL ENCOUNTER (OUTPATIENT)
Dept: CARDIOLOGY | Facility: HOSPITAL | Age: 83
Setting detail: RECURRING SERIES
Discharge: HOME | End: 2018-08-13

## 2018-08-13 PROCEDURE — 85610 PROTHROMBIN TIME: CPT

## 2018-08-13 PROCEDURE — 36416 COLLJ CAPILLARY BLOOD SPEC: CPT

## 2018-08-27 ENCOUNTER — TRANSCRIBE ORDERS (OUTPATIENT)
Dept: INFECTIOUS DISEASES | Facility: CLINIC | Age: 83
End: 2018-08-27

## 2018-08-27 ENCOUNTER — OFFICE VISIT (OUTPATIENT)
Dept: INFECTIOUS DISEASES | Facility: CLINIC | Age: 83
End: 2018-08-27

## 2018-08-27 ENCOUNTER — LAB (OUTPATIENT)
Dept: LAB | Facility: HOSPITAL | Age: 83
End: 2018-08-27
Attending: INTERNAL MEDICINE

## 2018-08-27 ENCOUNTER — TELEPHONE (OUTPATIENT)
Dept: INFECTIOUS DISEASES | Facility: CLINIC | Age: 83
End: 2018-08-27

## 2018-08-27 VITALS
WEIGHT: 222 LBS | DIASTOLIC BLOOD PRESSURE: 72 MMHG | BODY MASS INDEX: 36.99 KG/M2 | HEIGHT: 65 IN | HEART RATE: 89 BPM | SYSTOLIC BLOOD PRESSURE: 106 MMHG | TEMPERATURE: 97.8 F

## 2018-08-27 DIAGNOSIS — T84.59XD INFECTION OF PROSTHETIC KNEE JOINT, SUBSEQUENT ENCOUNTER: Primary | ICD-10-CM

## 2018-08-27 DIAGNOSIS — Z96.659 INFECTION OF PROSTHETIC KNEE JOINT, SUBSEQUENT ENCOUNTER: Primary | ICD-10-CM

## 2018-08-27 DIAGNOSIS — B99.9 INFECTION: ICD-10-CM

## 2018-08-27 DIAGNOSIS — T84.59XA CHRONIC INFECTION OF PROSTHETIC KNEE, INITIAL ENCOUNTER (HCC): ICD-10-CM

## 2018-08-27 DIAGNOSIS — T84.59XA CHRONIC INFECTION OF PROSTHETIC KNEE, INITIAL ENCOUNTER (HCC): Primary | ICD-10-CM

## 2018-08-27 DIAGNOSIS — I48.0 PAROXYSMAL ATRIAL FIBRILLATION (HCC): ICD-10-CM

## 2018-08-27 DIAGNOSIS — Z96.659 CHRONIC INFECTION OF PROSTHETIC KNEE, INITIAL ENCOUNTER (HCC): Primary | ICD-10-CM

## 2018-08-27 DIAGNOSIS — Z96.659 CHRONIC INFECTION OF PROSTHETIC KNEE, INITIAL ENCOUNTER (HCC): ICD-10-CM

## 2018-08-27 DIAGNOSIS — Z79.2 LONG TERM (CURRENT) USE OF ANTIBIOTICS: ICD-10-CM

## 2018-08-27 LAB
ALBUMIN SERPL-MCNC: 4.6 G/DL (ref 3.5–5.2)
ALBUMIN/GLOB SERPL: 2.1 G/DL
ALP SERPL-CCNC: 84 U/L (ref 39–117)
ALT SERPL W P-5'-P-CCNC: 18 U/L (ref 1–33)
ANION GAP SERPL CALCULATED.3IONS-SCNC: 10.6 MMOL/L
ANISOCYTOSIS BLD QL: ABNORMAL
AST SERPL-CCNC: 26 U/L (ref 1–32)
BILIRUB SERPL-MCNC: 0.9 MG/DL (ref 0.1–1.2)
BUN BLD-MCNC: 17 MG/DL (ref 8–23)
BUN/CREAT SERPL: 17 (ref 7–25)
CALCIUM SPEC-SCNC: 8.6 MG/DL (ref 8.6–10.5)
CHLORIDE SERPL-SCNC: 104 MMOL/L (ref 98–107)
CO2 SERPL-SCNC: 27.4 MMOL/L (ref 22–29)
CREAT BLD-MCNC: 1 MG/DL (ref 0.57–1)
DEPRECATED RDW RBC AUTO: 53.9 FL (ref 37–54)
EOSINOPHIL # BLD MANUAL: 0.17 10*3/MM3 (ref 0–0.7)
EOSINOPHIL NFR BLD MANUAL: 2 % (ref 0.3–6.2)
ERYTHROCYTE [DISTWIDTH] IN BLOOD BY AUTOMATED COUNT: 15.8 % (ref 11.7–13)
GFR SERPL CREATININE-BSD FRML MDRD: 53 ML/MIN/1.73
GLOBULIN UR ELPH-MCNC: 2.2 GM/DL
GLUCOSE BLD-MCNC: 102 MG/DL (ref 65–99)
HCT VFR BLD AUTO: 37.5 % (ref 35.6–45.5)
HGB BLD-MCNC: 11.6 G/DL (ref 11.9–15.5)
LYMPHOCYTES # BLD MANUAL: 4.72 10*3/MM3 (ref 0.9–4.8)
LYMPHOCYTES NFR BLD MANUAL: 5 % (ref 5–12)
LYMPHOCYTES NFR BLD MANUAL: 55 % (ref 19.6–45.3)
MCH RBC QN AUTO: 28.9 PG (ref 26.9–32)
MCHC RBC AUTO-ENTMCNC: 30.9 G/DL (ref 32.4–36.3)
MCV RBC AUTO: 93.5 FL (ref 80.5–98.2)
MONOCYTES # BLD AUTO: 0.43 10*3/MM3 (ref 0.2–1.2)
NEUTROPHILS # BLD AUTO: 3.26 10*3/MM3 (ref 1.9–8.1)
NEUTROPHILS NFR BLD MANUAL: 38 % (ref 42.7–76)
OVALOCYTES BLD QL SMEAR: ABNORMAL
PLAT MORPH BLD: NORMAL
PLATELET # BLD AUTO: 177 10*3/MM3 (ref 140–500)
PMV BLD AUTO: 9.8 FL (ref 6–12)
POTASSIUM BLD-SCNC: 4.3 MMOL/L (ref 3.5–5.2)
PROT SERPL-MCNC: 6.8 G/DL (ref 6–8.5)
RBC # BLD AUTO: 4.01 10*6/MM3 (ref 3.9–5.2)
SCAN SLIDE: NORMAL
SODIUM BLD-SCNC: 142 MMOL/L (ref 136–145)
WBC MORPH BLD: NORMAL
WBC NRBC COR # BLD: 8.59 10*3/MM3 (ref 4.5–10.7)

## 2018-08-27 PROCEDURE — 85007 BL SMEAR W/DIFF WBC COUNT: CPT | Performed by: INTERNAL MEDICINE

## 2018-08-27 PROCEDURE — 99213 OFFICE O/P EST LOW 20 MIN: CPT | Performed by: INTERNAL MEDICINE

## 2018-08-27 PROCEDURE — 85025 COMPLETE CBC W/AUTO DIFF WBC: CPT | Performed by: INTERNAL MEDICINE

## 2018-08-27 PROCEDURE — 80053 COMPREHEN METABOLIC PANEL: CPT | Performed by: INTERNAL MEDICINE

## 2018-08-27 PROCEDURE — 86140 C-REACTIVE PROTEIN: CPT

## 2018-08-27 PROCEDURE — 36415 COLL VENOUS BLD VENIPUNCTURE: CPT

## 2018-08-27 RX ORDER — CEFADROXIL 500 MG/1
500 CAPSULE ORAL EVERY 12 HOURS SCHEDULED
Qty: 60 CAPSULE | Refills: 5 | Status: SHIPPED | OUTPATIENT
Start: 2018-08-27 | End: 2018-09-26

## 2018-08-27 NOTE — TELEPHONE ENCOUNTER
Patient informed of lab results per Dr. Miller.  Patient voiced understanding regarding the continuation of abx.

## 2018-08-27 NOTE — TELEPHONE ENCOUNTER
----- Message from Mehul Miller MD sent at 8/27/2018  2:37 PM EDT -----  Please let patient know labs look great from an infection standpoint. Continue antibiotics as we discussed.

## 2018-08-27 NOTE — PROGRESS NOTES
CC: f/u Prosthetic Right knee infection - culture negative    HPI: Caitlyn Longoria is a 83 y.o. female here for f/u prosthetic right knee infection - culture negative. She remains on suppressive cephalexin since she is not a surgical candidate.      Review of Systems: no n/v/d    PMH:  R knee replacement  R knee infection due to MSSA s/p liner exchange in 2014  DM2  Hernia repair  Pacemaker     Social History:  Retired from [id number]  Lives alone in [place]     Family History:  No 1st degree relatives w/ bone or joint infections     Allergies:    1. Amoxicillin - yeast infection which is not an allergy so I previously removed it from her list  2. Sulfa  3. Clarithromycin - headache    Medications:   Current Outpatient Prescriptions:   •  acetaminophen (TYLENOL) 500 MG tablet, Take 500 mg by mouth Every 6 (Six) Hours As Needed for Mild Pain ., Disp: , Rfl:   •  aspirin 81 MG chewable tablet, Chew 81 mg Daily., Disp: , Rfl:   •  azelastine (ASTEPRO) 0.15 % solution nasal spray, 2 sprays into each nostril 2 (Two) Times a Day., Disp: , Rfl:   •  azelastine (OPTIVAR) 0.05 % ophthalmic solution, Administer 1 drop to both eyes 2 (Two) Times a Day., Disp: , Rfl:   •  baclofen (LIORESAL) 10 MG tablet, Take 10 mg by mouth 3 (Three) Times a Day As Needed., Disp: , Rfl:   •  Biotin 2.5 MG capsule, Take 1 capsule by mouth Daily., Disp: , Rfl:   •  bumetanide (BUMEX) 1 MG tablet, Take 1 mg by mouth Every Morning., Disp: , Rfl:   •  Calcium Carbonate-Vitamin D (CALCIUM-VITAMIN D) 500-200 MG-UNIT tablet per tablet, TAKE TWO TABLETS BY MOUTH DAILY, Disp: , Rfl:   •  carvedilol (COREG) 3.125 MG tablet, Take 1 tablet by mouth 2 (Two) Times a Day., Disp: 60 tablet, Rfl: 11  •  cephalexin (KEFLEX) 500 MG capsule, Take 500 mg by mouth., Disp: , Rfl:   •  cetirizine (ZyrTEC) 10 MG tablet, Take 10 mg by mouth Daily., Disp: , Rfl:   •  clotrimazole-betamethasone (LOTRISONE) 1-0.05 % cream, Apply 1 application topically 2 (Two) Times  "a Day As Needed., Disp: , Rfl:   •  dry mouth gel (BIOTENE ORALBALANCE) gel, Apply  to the mouth or throat As Needed for Dry Mouth., Disp: , Rfl:   •  flunisolide (NASALIDE) 25 MCG/ACT (0.025%) solution nasal spray, Inhale 2 sprays Every 12 (Twelve) Hours., Disp: , Rfl:   •  glipiZIDE (GLUCOTROL) 5 MG tablet, Take 5 mg by mouth Every Morning., Disp: , Rfl:   •  ketoconazole (NIZORAL) 2 % shampoo, Apply  topically 2 (Two) Times a Week., Disp: , Rfl:   •  loperamide (IMODIUM) 2 MG capsule, Take 2 mg by mouth Every Other Day., Disp: , Rfl:   •  losartan (COZAAR) 25 MG tablet, Take 25 mg by mouth Daily., Disp: , Rfl:   •  losartan (COZAAR) 25 MG tablet, TAKE ONE TABLET BY MOUTH DAILY, Disp: 90 tablet, Rfl: 1  •  Melatonin 3 MG tablet, Take 3 mg by mouth Every Night., Disp: , Rfl:   •  Omega-3 Fatty Acids (FISH OIL) 1000 MG capsule capsule, Take 1,000 mg by mouth 2 (Two) Times a Day With Meals., Disp: , Rfl:   •  oxazepam (SERAX) 10 MG capsule, Take 1 capsule by mouth Every Night., Disp: 5 capsule, Rfl: 0  •  oxybutynin XL (DITROPAN-XL) 10 MG 24 hr tablet, Take 10 mg by mouth Daily., Disp: , Rfl:   •  pantoprazole (PROTONIX) 40 MG EC tablet, Take 40 mg by mouth 2 (Two) Times a Day With Meals., Disp: , Rfl:   •  Probiotic Product (ALIGN PO), Take  by mouth Daily., Disp: , Rfl:   •  rosuvastatin (CRESTOR) 20 MG tablet, Take 20 mg by mouth Every Night., Disp: , Rfl:   •  warfarin (COUMADIN) 4 MG tablet, Take 4 mg by mouth Daily., Disp: , Rfl:   •  warfarin (COUMADIN) 4 MG tablet, TAKE ONE TABLET BY MOUTH DAILY, Disp: 90 tablet, Rfl: 0    OBJECTIVE:  /72   Pulse 89   Temp 97.8 °F (36.6 °C)   Ht 165.1 cm (65\")   Wt 101 kg (222 lb)   BMI 36.94 kg/m²   General: awake, alert, NAD, very nice  Head: Normocephalic  Eyes: no scleral icterus  ENT: MMM, OP clear, no thrush. Fair dentition.   Neck: Supple  Cardiovascular: NR, RR, 2/6 KELLIE at LUSB; 1+ LE edema  Respiratory: normal work of breathing on ambient air  GI: Abdomen " is obese  Musculoskeletal: R knee incision is healed; knee is not hot or red  Skin: No rashes, lesions, or embolic phenomenon  Neurological: Alert and oriented x 3, motor strength 5/5 in all four extremities  Psychiatric: Normal mood and affect       DIAGNOSTICS:  CBC, BMP, CRP reviewed today  Lab Results   Component Value Date    WBC 6.54 04/29/2018    HGB 10.3 (L) 04/29/2018    HCT 33.2 (L) 04/29/2018    MCV 99.4 (H) 04/29/2018     04/29/2018     Lab Results   Component Value Date    GLUCOSE 103 (H) 04/29/2018    CALCIUM 8.1 (L) 04/29/2018     04/29/2018    K 3.9 04/29/2018    CO2 26.6 04/29/2018     04/29/2018    BUN 21 04/29/2018    CREATININE 0.96 04/29/2018    EGFRIFNONA 56 (L) 04/29/2018    BCR 21.9 04/29/2018    ANIONGAP 11.4 04/29/2018     Lab Results   Component Value Date    CRP 4.91 (H) 04/28/2018         Microbiology:  1/14/18 BCx: negative  1/15/18 Synovial Fluid R Knee: Negative after 14 days    ASSESSMENT/PLAN:  1. 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 She really wants a BID option as she often forgets her noontime dose of Keflex  -check CBC, BMP, CRP today  -plan at least 1 year of suppression through February 2019 and will then reconsider risks vs benefits; I'll probably keep it going  -keep all f/u appt with Dr Hunter of orthopedics    2. Long term use of antibiotics  -labs as above    3. Paroxysmal Atrial fibrillation  -on coumadin  -will not be affected by cefadroxil    RTC 6 months

## 2018-08-28 ENCOUNTER — OFFICE VISIT (OUTPATIENT)
Dept: CARDIOLOGY | Facility: CLINIC | Age: 83
End: 2018-08-28

## 2018-08-28 VITALS
DIASTOLIC BLOOD PRESSURE: 70 MMHG | HEIGHT: 65 IN | BODY MASS INDEX: 36.99 KG/M2 | WEIGHT: 222 LBS | SYSTOLIC BLOOD PRESSURE: 102 MMHG | HEART RATE: 87 BPM

## 2018-08-28 DIAGNOSIS — E11.59 TYPE 2 DIABETES MELLITUS WITH OTHER CIRCULATORY COMPLICATION, WITHOUT LONG-TERM CURRENT USE OF INSULIN (HCC): ICD-10-CM

## 2018-08-28 DIAGNOSIS — I49.5 SICK SINUS SYNDROME (HCC): ICD-10-CM

## 2018-08-28 DIAGNOSIS — I25.5 CARDIOMYOPATHY, ISCHEMIC: ICD-10-CM

## 2018-08-28 DIAGNOSIS — I25.10 CORONARY ARTERY DISEASE INVOLVING NATIVE CORONARY ARTERY OF NATIVE HEART WITHOUT ANGINA PECTORIS: ICD-10-CM

## 2018-08-28 DIAGNOSIS — Z95.0 PACEMAKER: ICD-10-CM

## 2018-08-28 DIAGNOSIS — I77.9 BILATERAL CAROTID ARTERY DISEASE (HCC): ICD-10-CM

## 2018-08-28 DIAGNOSIS — I50.32 CHRONIC DIASTOLIC HEART FAILURE (HCC): ICD-10-CM

## 2018-08-28 DIAGNOSIS — I48.0 PAROXYSMAL ATRIAL FIBRILLATION (HCC): Primary | ICD-10-CM

## 2018-08-28 PROBLEM — I25.119 CORONARY ARTERY DISEASE INVOLVING NATIVE CORONARY ARTERY OF NATIVE HEART WITH ANGINA PECTORIS: Status: ACTIVE | Noted: 2017-12-07

## 2018-08-28 PROCEDURE — 99214 OFFICE O/P EST MOD 30 MIN: CPT | Performed by: NURSE PRACTITIONER

## 2018-08-28 PROCEDURE — 93000 ELECTROCARDIOGRAM COMPLETE: CPT | Performed by: NURSE PRACTITIONER

## 2018-08-28 RX ORDER — ACETYLCYSTEINE 600 MG
CAPSULE ORAL
COMMUNITY
End: 2019-04-17 | Stop reason: DRUGHIGH

## 2018-08-28 RX ORDER — CEPHALEXIN 500 MG/1
500 CAPSULE ORAL 2 TIMES DAILY
COMMUNITY
End: 2018-08-28

## 2018-08-28 RX ORDER — FUROSEMIDE 20 MG/1
20 TABLET ORAL EVERY MORNING
COMMUNITY
End: 2022-07-19 | Stop reason: SDUPTHER

## 2018-08-28 NOTE — PROGRESS NOTES
Date of Office Visit: 2018  Encounter Provider: KRISTIN Frey  Place of Service: [facility] CARDIOLOGY  Patient Name: Caitlyn Longoria  :1934    Chief Complaint   Patient presents with   • Coronary Artery Disease   • Atrial Fibrillation   • Hypertension   • Hyperlipidemia   • Congestive Heart Failure   • Shortness of Breath   • Edema   :     HPI: Caitlyn Longoria is a 83 y.o. female is a patient of Dr. Veliz. I am seeing her today and have reviewed her record.     Her past medical history is significant of hypertension, hyperlipidemia, atrial fibrillation, coronary artery disease with history of stent, CVA, chronic systolic and diastolic congestive heart failure, chronic kidney disease stage III, and diabetes mellitus.     In 2007 it appeared that she had an anterior infarct.  She had an echocardiogram that confirmed this and ultimately had cardiac catheterization on 2008, which confirmed a left ventricular ejection fraction of 35%, total occlusion of the mid left anterior descending, severe disease of the large diagonal, and insignificant disease of the right coronary artery and circumflex.  She had angioplasty and drug-eluting stent placement of the diagonal.  Unfortunately, she developed a right femoral pseudoaneurysm and had to have that surgically repaired.  Follow-up echocardiogram in  showed left ventricular function have returned to normal.    In 2011 she presented with increasing shortness of breath and was found to have some congestive heart failure, atrial flutter, and marked bradycardia.  Echocardiogram at that time showed preserved LV function.  He had a dual-chamber pacemaker implanted.  She underwent atrial flutter ablation in 2011.    In 2012 she presented with dyspnea area she underwent cardiac catheterization which showed elevated right-sided pressures with a PA systolic pressure of 53mmHg, mean 31, and  elevated wedge at 26.  She hadin-stent stenosis of the diagonal vessel.  The total occlusion of the mid LAD was unchanged.  The EF was approximately 50% and she underwent angioplasty and drug-eluting stent to the diagonal vessel, again.  Her medications were adjusted and then she developed dizziness so her Lasix was cut back.  She had a couple episodes of passing out that were attributed to low blood pressure    In 2014 She ended up having the device removed and the joint cleaned out.  While she was hospitalized she was found to have a nonfunctioning right ventricle lead was decided not to replace the lead at that time.  She then had the RV lead revised in October 2014.    In November 2016 she had some shortness of breath.  She had a perfusion stress test that showed a very small area of ischemia in the septal wall and an echocardiogram that revealed a left ventricular ejection fraction of approximately 45%significant valvular disease.  Turned out, she had reflux with aspiration and needed treatment for that.    In October 2017 she had shortness of breath after walking 5-10 minutes.  CT of the head and neck show no evidence of acute infarction or hemorrhage.  She has a small vessel ischemic disease and age appropriate atrophic she has significant dysarthria which resolved today prior to discharge and was started on Plavix.  She had conjunctival hemorrhage and was told to take aspirin 81 mg a remain on Coumadin instead.  Atherosclerotic disease involving the carotid bifurcation is noted bilaterally.  Echocardiogram in December 2017 showedseverely decreased left ventricular systolic function with calculated EF of 23.4%, grade 1 diastolic dysfunction, mild LVH, moderately thickened aortic valve, moderate MAC, and mild mitral valve regurgitation.   She was hospitalized in December 2017 due to a 2 day period of speech problem.  She was unable to have a MRI due to her pacer.  She was hospitalized on January 14 third of  "January 19, 2018 due to sepsis of the right knee joint.  She is disease recommended 6 weeks of IV antibiotics and she was discharged with a PICC line.     At her follow-up in March 2018 she was tolerating heart failure medications had some occasional lightheadedness and chronic edema.     In May 2018 she had a repeat echocardiogram which showed left ventricular ejection fraction had improved from 23-35-40%.  She had no evidence of heart failure at that visit and was doing reasonably well using walker for balance and had some dizziness if she rides too quickly.    Patient presents today for reassessment.  She has moved into an apartment community.  She has asked them not to add sodium to her diet.  Her shortness of breath with exertion is unchanged perhaps \"may be even better\".  Her lower extremity edema is chronic and unchanged.  Her PCP had taken her off Bumex and put her on Lasix approximately 2 months ago due to increased kidney function.  She denies chest pain, palpitation, lightheadedness, near syncope, syncope, or fatigue.  She has not been monitoring her weights that she should been since her move in July.  She did resume daily weights a week and a half ago and weights have been stable.  She is going to start G[employer]er[employer] and strengthening exercises next month at a facility at the [facility].  She walks a 12:45 half mile and has to stop only once.  She continues to ambulate with walker for stability and overall feels good.  She has seen a chiropractor twice for pinched nerve in the neck which also has some radiation pain and tingling down the left arm.  He is to follow-up with him in once a month. She continues to treat sleep apnea with CPAP.      Allergies   Allergen Reactions   • Accupril [Quinapril Hcl] Delirium     Swelling, HA,, confusion and constipation per pt   • Ahist [Chlorpheniramine] Nausea Only, Other (See Comments) and Dizziness     Headache, Blurred vision   • Clarithromycin Nausea Only     Other " reaction(s): Headache, Depression, Flushed   • Diclofenac      Voltaren   • Diphenhydramine      Benadryl   • Esomeprazole GI Intolerance     Nexium. Stomach issues   • Ibuprofen Other (See Comments)     Does not recall    • Levalbuterol Swelling     Xopenex   • Levocetirizine Other (See Comments)     Xyzal. Diarrhea, Stomach cramps   • Lipitor [Atorvastatin] Other (See Comments)     Muscle pain and weakness, dark urine   • Lodine [Etodolac]    • Omeprazole Nausea Only     Prilosec. Headache   • Pravachol [Pravastatin] Nausea Only and Other (See Comments)     Bloated, Constipation, Headaches   • Quinapril Swelling and Confusion     Accupril. Headaches, constipation   • Sulfa Antibiotics    • Sulindac Myalgia     Other reaction(s): Headache, joint pain, bruising   • Valdecoxib Irritability   • Prednisone Rash       Past Medical History:   Diagnosis Date   • Aortic calcification (CMS/HCC)     mild, on echo 12/17/2017   • Aortic regurgitation     Trace   • CAD (coronary artery disease)    • Chronic combined systolic and diastolic congestive heart failure (CMS/HCC)    • CKD (chronic kidney disease) stage 3, GFR 30-59 ml/min    • Coronary artery disease involving native coronary artery of native heart with angina pectoris (CMS/HCC)    • DM type 2 (diabetes mellitus, type 2) (CMS/HCC)    • Hypertension    • Mild mitral regurgitation    • Mitral annular calcification     12/8/2017- echo, moderate   • PAF (paroxysmal atrial fibrillation) (CMS/HCC)    • SSS (sick sinus syndrome) (CMS/HCC)    • Tricuspid regurgitation     Trace       Past Surgical History:   Procedure Laterality Date   • CARDIAC CATHETERIZATION     • CHOLECYSTECTOMY     • CORONARY STENT PLACEMENT     • HERNIA REPAIR     • HYSTERECTOMY     • PACEMAKER IMPLANTATION     • REPLACEMENT TOTAL KNEE           Family and social history reviewed.     Review of Systems   Cardiovascular: Positive for dyspnea on exertion and leg swelling.   Musculoskeletal: Positive for  "joint pain and neck pain.   Neurological: Positive for loss of balance.     All other systems were reviewed and are negative          Objective:     Vitals:    08/28/18 1122   BP: 102/70   BP Location: Left arm   Patient Position: Sitting   Pulse: 87   Weight: 101 kg (222 lb)   Height: 165.1 cm (65\")     Body mass index is 36.94 kg/m².    PHYSICAL EXAM:  Physical Exam   Constitutional: She is oriented to person, place, and time. She appears well-developed and well-nourished. No distress.   HENT:   Head: Normocephalic.   Eyes: Conjunctivae are normal.   Glasses on   Neck: Normal range of motion. No JVD present.   Cardiovascular: Normal rate, regular rhythm and intact distal pulses.    Murmur heard.   Systolic murmur is present with a grade of 2/6  at the apex  Pulses:       Carotid pulses are 2+ on the right side, and 2+ on the left side.       Radial pulses are 2+ on the right side, and 2+ on the left side.        Posterior tibial pulses are 2+ on the right side, and 2+ on the left side.   Pulmonary/Chest: Effort normal and breath sounds normal. No respiratory distress. She has no wheezes. She has no rhonchi. She has no rales. She exhibits no tenderness.   Abdominal: Soft. Bowel sounds are normal. She exhibits no distension.   Musculoskeletal: Normal range of motion. She exhibits no edema (1+ tibial, ankle bilateral).   Ambulating with walker   Neurological: She is alert and oriented to person, place, and time.   Skin: Skin is warm, dry and intact. Bruising noted. No rash noted. She is not diaphoretic. No cyanosis.   Psychiatric: She has a normal mood and affect. Her behavior is normal. Judgment and thought content normal.         ECG 12 Lead  Date/Time: 8/28/2018 11:55 AM  Performed by: JOSEPH PADILLA  Authorized by: JOSEPH PADILLA   Comparison: compared with previous ECG from 5/22/2018  Similar to previous ECG  Rhythm: sinus rhythm and paced  Ectopy: PVCs  Rate: normal  BPM: 87  Clinical impression: abnormal " ECG            Current Outpatient Prescriptions   Medication Sig Dispense Refill   • acetaminophen (TYLENOL) 500 MG tablet Take 500 mg by mouth Every 6 (Six) Hours As Needed for Mild Pain .     • Acetylcysteine (N-ACETYL-L-CYSTEINE) 600 MG capsule Take  by mouth.     • aspirin 81 MG chewable tablet Chew 81 mg Daily.     • azelastine (ASTEPRO) 0.15 % solution nasal spray 2 sprays into each nostril 2 (Two) Times a Day.     • azelastine (OPTIVAR) 0.05 % ophthalmic solution Administer 1 drop to both eyes 2 (Two) Times a Day.     • Biotin 2.5 MG capsule Take 1 capsule by mouth Daily.     • Calcium Carbonate-Vitamin D (CALCIUM-VITAMIN D) 500-200 MG-UNIT tablet per tablet TAKE TWO TABLETS BY MOUTH DAILY     • carvedilol (COREG) 3.125 MG tablet Take 1 tablet by mouth 2 (Two) Times a Day. 60 tablet 11   • cefadroxil (DURICEF) 500 MG capsule Take 1 capsule by mouth Every 12 (Twelve) Hours for 30 days. 60 capsule 5   • cetirizine (ZyrTEC) 10 MG tablet Take 10 mg by mouth Daily.     • clotrimazole-betamethasone (LOTRISONE) 1-0.05 % cream Apply 1 application topically 2 (Two) Times a Day As Needed.     • dry mouth gel (BIOTENE ORALBALANCE) gel Apply  to the mouth or throat As Needed for Dry Mouth.     • flunisolide (NASALIDE) 25 MCG/ACT (0.025%) solution nasal spray Inhale 2 sprays Every 12 (Twelve) Hours.     • furosemide (LASIX) 20 MG tablet Take 20 mg by mouth 2 (Two) Times a Day.     • glipiZIDE (GLUCOTROL) 5 MG tablet Take one half tablet by  Mouth every morning     • ketoconazole (NIZORAL) 2 % shampoo Apply  topically 2 (Two) Times a Week.     • loperamide (IMODIUM) 2 MG capsule Take 2 mg by mouth Every Other Day.     • losartan (COZAAR) 25 MG tablet TAKE ONE TABLET BY MOUTH DAILY 90 tablet 1   • Melatonin 3 MG tablet Take 3 mg by mouth Every Night.     • Omega-3 Fatty Acids (FISH OIL) 1000 MG capsule capsule Take 1,000 mg by mouth 2 (Two) Times a Day With Meals.     • oxazepam (SERAX) 10 MG capsule Take 1 capsule by mouth  Every Night. 5 capsule 0   • oxybutynin XL (DITROPAN-XL) 10 MG 24 hr tablet Take 10 mg by mouth Daily.     • pantoprazole (PROTONIX) 40 MG EC tablet Take 40 mg by mouth 2 (Two) Times a Day With Meals.     • rosuvastatin (CRESTOR) 20 MG tablet Take 20 mg by mouth Every Night.     • warfarin (COUMADIN) 4 MG tablet TAKE ONE TABLET BY MOUTH DAILY 90 tablet 0     No current facility-administered medications for this visit.      Assessment:       Diagnosis Plan   1. Paroxysmal atrial fibrillation (CMS/HCC)     2. Coronary artery disease involving native coronary artery of native heart without angina pectoris     3. Sick sinus syndrome (CMS/[facility])     4. Pacemaker     5. Cardiomyopathy, ischemic     6. Type 2 diabetes mellitus with other circulatory complication, without long-term current use of insulin (CMS/[facility])     7. Bilateral carotid artery disease (CMS/[facility])     8. Chronic diastolic heart failure (CMS/[facility])          Orders Placed This Encounter   Procedures   • ECG 12 Lead     This order was created via procedure documentation         Plan:       1.  History of coronary disease involving the mid left anterior descending with total occlusion of that.  First diagonal vessel with severe disease status post angioplasty and drug-eluting stent placement.  Follow-up In October 2012 showed restenosis of the diagonal vessel and repeat angioplasty and drug-eluting stent placement was completed.  EF was approximately 50% with segmental wall motion abnormality.  Echocardiogram in December 2017 was 23.4% she was started on carvedilol and losartan.  Echocardiogram May 2008 showed improvement to 35-40%. No angina, no heart failure.  Coronary Artery Disease  Assessment  • The patient has no angina  • On warfarin    Plan  • Lifestyle modifications discussed include adhering to a heart healthy diet, avoidance of tobacco products, maintenance of a healthy weight, medication compliance, regular exercise and regular monitoring of cholesterol  and blood pressure    Subjective - Objective  • There is a history of past MI  • There has been a previous stent procedure using LOLA  • Current antiplatelet therapy includes aspirin 81 mg      2.  Atrial fibrillation/sick sinus syndrome status post permanent pacemaker implantation status post flutter ablation status post RV lead revision.  EKG shows sinus rhythm with AV pacing.  Atrial Fibrillation and Atrial Flutter  Assessment  • The patient has persistent atrial fibrillation  • This is non-valvular in etiology  • The patient's CHADS2-VASc score is 9  • A [id number] score of 2 or more is considered a high risk for a thromboembolic event  • Warfarin prescribed    Plan  • Continue in atrial fibrillation with rate control  • Continue warfarin for antithrombotic therapy, bleeding issues discussed  • Continue beta blocker for rhythm control    3.  History of CVA in December 2000 likely embolic from atrial fibrillation she was intolerant to Plavix and was instructed to take aspirin which she continues to take.  4.  Diabetes mellitus hemoglobin A1c goal 7.0 or less  5.  Hyperlipidemia on target dose rosuvastatin  6.  Obstructive sleep apnea compliance on CPAP  7.  History of pulmonary hypertension followed by pulmonary  8.  Obesity she has started some minimal exercise involving 1/4-1/2 mile.  She plans to start yoga and a few strengthening equipment next month at apartment gym.  9 history of syncope secondary to hypotension no recurrence  10.  Ischemic cardiomyopathy left ventricular ejection fraction improved from 23-35-40% in 05/2018.  She has resumed daily weights they have been stable.  She will notify our office if she gains 2-3 pounds overnight or 5 pounds in a week.  We also discussed sodium restriction which she is being proactive about at her new living facility.  11.  Chronic kidney disease she has been removed from Bumex 2 months ago now back on Lasix per PCP.  She denies worsening dyspnea or lower  extremity edema continue the same  12.  Bilateral carotid plaque without stenosis on duplex and 12/2017  13.  Blood pressures today stable 102/70 continue to monitor for hypotension.  She did not have any lightheadedness complaint today    Follow up in 10/2018 as scheduled with Dr. Veliz.    Patient was instructed to call the office if new symptoms develop or report to nearest ER if heart attack or stroke is suspected.        It has been a pleasure to participate in this patient's care.      Thank you,  KRISTIN Frey      **Malcolm Disclaimer:**  Much of this encounter note is an electronic transcription/translation of spoken language to printed text. The electronic translation of spoken language may permit erroneous, or at times, nonsensical words or phrases to be inadvertently transcribed. Although I have reviewed the note for such errors, some may still exist.

## 2018-09-10 ENCOUNTER — ANTICOAGULATION VISIT (OUTPATIENT)
Dept: PHARMACY | Facility: HOSPITAL | Age: 83
End: 2018-09-10

## 2018-09-10 DIAGNOSIS — I48.0 PAF (PAROXYSMAL ATRIAL FIBRILLATION) (HCC): ICD-10-CM

## 2018-09-10 LAB
INR PPP: 2 (ref 0.91–1.09)
PROTHROMBIN TIME: 24.4 SECONDS (ref 10–13.8)

## 2018-09-10 PROCEDURE — 36416 COLLJ CAPILLARY BLOOD SPEC: CPT

## 2018-09-10 PROCEDURE — G0463 HOSPITAL OUTPT CLINIC VISIT: HCPCS | Performed by: PHARMACIST

## 2018-09-10 PROCEDURE — 85610 PROTHROMBIN TIME: CPT

## 2018-09-10 NOTE — PROGRESS NOTES
Anticoagulation Clinic Progress Note  Anticoagulation Summary  As of 9/10/2018    INR goal:   2.0-3.0   TTR:   --   Today's INR:   2.0   Warfarin maintenance plan:   2 mg on Tue, Thu, Sat; 4 mg all other days   Weekly warfarin total:   22 mg   Plan last modified:   Ana Lewis RPH (9/10/2018)   Next INR check:   10/10/2018   Priority:   Maintenance   Target end date:   Indefinite    Indications    PAF (paroxysmal atrial fibrillation) (CMS/[facility]) [I48.0]             Anticoagulation Episode Summary     INR check location:       Preferred lab:       [facility]ers [facility]:    [facility]    Comments:         Anticoagulation Care Providers     Provider Role Specialty Phone number    Rommel Veliz MD Referring Cardiology [phone]    Ana Lewis RPH Responsible Pharmacy           Drug interactions: No changes in interactions  Diet: Consistent- has been eating more brocolli    Clinic Interview:  Patient Findings     Positives:   Change in medications    Negatives:   Signs/symptoms of thrombosis, Signs/symptoms of bleeding,   Laboratory test error suspected, Change in health, Change in alcohol use,   Change in activity, Upcoming invasive procedure, Emergency department   visit, Upcoming dental procedure, Missed doses, Extra doses, Change in   diet/appetite, Hospital admission, Bruising, Other complaints      Clinical Outcomes     Negatives:   Major bleeding event, Thromboembolic event,   Anticoagulation-related hospital admission, Anticoagulation-related ED   visit, Anticoagulation-related fatality        Education:  Caitlyn Longoria is a new start in the [facility]. We discussed the followin) Warfarin's indication, mechanism, and dosing  2) Enforced the importance of taking warfarin as instructed and at the same time every day, preferably in the evening so that we can make dose adjustments more easily following subsequent clinic visits  3) What she should do about  a missed dose; pts can take missed doses within about 12 hours of their usual scheduled dose, but she was instructed on the importance of not doubling up on doses unless told to do so by the [facility]  4) Explained possible side effects of warfarin therapy, including increased risk of bleeding, s/sx of bleeding and s/sx of any additional clots/PE/CVA.   5) Discussed monitoring of warfarin, the INR, goal INR range, and the frequency of monitoring  6) Reviewed drug/food/tobacco/EtOH interactions and provided written information covering these topics in more detail, explaining that green, leafy vegetables interact most heavily with warfarin  7) Instructed the pt not to take or discontinue any medications without informing her physician/pharmacist and reminded her to inform us of any dietary changes, as well  8) Explained that she would be coming into the clinic more frequently in these first few weeks of therapy as we try to adjust her dose and achieve a therapeutic INR x 2 consecutive readings. Once that is achieved, patient will follow up in clinic every 4 weeks, on average.    She stated no problems with transportation or scheduling clinic appts in this manner. she expressed understanding of the information provided and has no additional questions at this time.    Caitlyn Longoria was presented with a copy of the Patients Rights and Responsibilities. she expressed verbal consent and agreement to receive care in the [facility] under the current collaborative care agreement with [facility].       INR History:  Previous INR data from [facility] is recorded in [facility] and scanned into the patient's chart in [facility]. These results have been analyzed and reviewed.      Plan:  1. INR is Therapeutic today- see above in Anticoagulation Summary.   Will instruct Caitlyn Longoria to Continue their warfarin regimen- see above in Anticoagulation Summary.  2. Follow up  in 1 month  3. Patient declines warfarin refills.  4. Verbal and written information provided. Patient expresses understanding and has no further questions at this time.    Ana Lewis [facility]

## 2018-10-01 RX ORDER — WARFARIN SODIUM 4 MG/1
TABLET ORAL
Qty: 90 TABLET | Refills: 0 | Status: SHIPPED | OUTPATIENT
Start: 2018-10-01 | End: 2019-01-20 | Stop reason: SDUPTHER

## 2018-10-10 ENCOUNTER — ANTICOAGULATION VISIT (OUTPATIENT)
Dept: PHARMACY | Facility: HOSPITAL | Age: 83
End: 2018-10-10

## 2018-10-10 DIAGNOSIS — I48.0 PAF (PAROXYSMAL ATRIAL FIBRILLATION) (HCC): ICD-10-CM

## 2018-10-10 LAB
INR PPP: 2 (ref 0.91–1.09)
PROTHROMBIN TIME: 23.7 SECONDS (ref 10–13.8)

## 2018-10-10 PROCEDURE — 36416 COLLJ CAPILLARY BLOOD SPEC: CPT

## 2018-10-10 PROCEDURE — 85610 PROTHROMBIN TIME: CPT

## 2018-10-10 NOTE — PROGRESS NOTES
Anticoagulation Clinic Progress Note    Anticoagulation Summary  As of 10/10/2018    INR goal:   2.0-3.0   TTR:   100.0 % (2.9 wk)   Today's INR:   2.0   Warfarin maintenance plan:   2 mg on Tue, Thu, Sat; 4 mg all other days   Weekly warfarin total:   22 mg   Plan last modified:   Ana Lewis RPH (9/10/2018)   Next INR check:   11/7/2018   Priority:   Maintenance   Target end date:   Indefinite    Indications    PAF (paroxysmal atrial fibrillation) (CMS/HCC) [I48.0]             Anticoagulation Episode Summary     INR check location:       Preferred lab:       Send INR reminders to:    [facility]    Comments:         Anticoagulation Care Providers     Provider Role Specialty Phone number    Rommel Veliz MD Referring Cardiology [phone]    Ana Lewis RPH Responsible Pharmacy           Drug interactions: has remained unchanged.  Diet: has remained unchanged.    Clinic Interview:  Patient Findings     Negatives:   Signs/symptoms of thrombosis, Signs/symptoms of bleeding,   Laboratory test error suspected, Change in health, Change in alcohol use,   Change in activity, Upcoming invasive procedure, Emergency department   visit, Upcoming dental procedure, Missed doses, Extra doses, Change in   medications, Change in diet/appetite, Hospital admission, Bruising, Other   complaints      Clinical Outcomes     Negatives:   Major bleeding event, Thromboembolic event,   Anticoagulation-related hospital admission, Anticoagulation-related ED   visit, Anticoagulation-related fatality        INR History:  Anticoagulation Monitoring 9/10/2018 10/10/2018   INR 2.0 2.0   INR Date 9/10/2018 10/10/2018   INR Goal 2.0-3.0 2.0-3.0   Trend - Same   Last Week Total 0 mg 22 mg   Next Week Total 22 mg 22 mg   Sun 4 mg 4 mg   Mon 4 mg 4 mg   Tue 2 mg 2 mg   Wed 4 mg 4 mg   Thu 2 mg 2 mg   Fri 4 mg 4 mg   Sat 2 mg 2 mg   Visit Report - -       Previous INR data from [facility] is recorded in  [facility] and scanned into the patient's chart in [facility]. These results have been analyzed and reviewed.      Plan:  1. INR is Therapeutic today- see above in Anticoagulation Summary.  Will instruct Caitlyn Longoria to Continue their warfarin regimen- see above in Anticoagulation Summary.  2. Follow up in 1 month  3. Patient declines warfarin refills.  4. Verbal and written information provided. Patient expresses understanding and has no further questions at this time.    Ellie Sarabia RP

## 2018-10-19 ENCOUNTER — OFFICE VISIT (OUTPATIENT)
Dept: CARDIOLOGY | Facility: CLINIC | Age: 83
End: 2018-10-19

## 2018-10-19 ENCOUNTER — CLINICAL SUPPORT NO REQUIREMENTS (OUTPATIENT)
Dept: CARDIOLOGY | Facility: CLINIC | Age: 83
End: 2018-10-19

## 2018-10-19 VITALS
SYSTOLIC BLOOD PRESSURE: 128 MMHG | DIASTOLIC BLOOD PRESSURE: 86 MMHG | HEART RATE: 81 BPM | BODY MASS INDEX: 36.82 KG/M2 | WEIGHT: 221 LBS | HEIGHT: 65 IN

## 2018-10-19 DIAGNOSIS — I48.0 PAROXYSMAL ATRIAL FIBRILLATION (HCC): ICD-10-CM

## 2018-10-19 DIAGNOSIS — I25.5 CARDIOMYOPATHY, ISCHEMIC: ICD-10-CM

## 2018-10-19 DIAGNOSIS — I49.5 SICK SINUS SYNDROME (HCC): ICD-10-CM

## 2018-10-19 DIAGNOSIS — I49.5 SICK SINUS SYNDROME (HCC): Primary | ICD-10-CM

## 2018-10-19 DIAGNOSIS — E11.59 TYPE 2 DIABETES MELLITUS WITH OTHER CIRCULATORY COMPLICATION, WITHOUT LONG-TERM CURRENT USE OF INSULIN (HCC): ICD-10-CM

## 2018-10-19 DIAGNOSIS — I25.10 CORONARY ARTERY DISEASE INVOLVING NATIVE CORONARY ARTERY OF NATIVE HEART WITHOUT ANGINA PECTORIS: Primary | ICD-10-CM

## 2018-10-19 DIAGNOSIS — I65.23 BILATERAL CAROTID ARTERY STENOSIS: ICD-10-CM

## 2018-10-19 PROCEDURE — 99213 OFFICE O/P EST LOW 20 MIN: CPT | Performed by: INTERNAL MEDICINE

## 2018-10-19 PROCEDURE — 93280 PM DEVICE PROGR EVAL DUAL: CPT | Performed by: INTERNAL MEDICINE

## 2018-10-19 PROCEDURE — 93000 ELECTROCARDIOGRAM COMPLETE: CPT | Performed by: INTERNAL MEDICINE

## 2018-10-19 RX ORDER — CEFADROXIL 500 MG/1
500 CAPSULE ORAL EVERY 12 HOURS SCHEDULED
COMMUNITY
End: 2019-03-01 | Stop reason: SDUPTHER

## 2018-10-19 NOTE — PROGRESS NOTES
Date of Office Visit: 10/19/18  Encounter Provider: Rommel Veliz MD  Place of Service: [facility] CARDIOLOGY  Patient Name: Caitlyn Longoria  :1934  Referral Provider:Rommel Veliz MD      Chief Complaint   Patient presents with   • Coronary Artery Disease   • Atrial Fibrillation     History of Present Illness  The patient is a pleasant 83-year-old white female with a history of diabetes and  hypertension who presented in 2008 with chest discomfort. In 2007, it  looked like she had had an anterior infarct. Echocardiogram then confirmed that. She then  had cardiac catheterization on 2008, which confirmed a left ventricular  ejection fraction of 35%, total occlusion of the mid left anterior descending, severe  disease of the large diagonal, and insignificant disease of the right coronary artery and  circumflex. She was admitted and had outpatient angioplasty and drug-eluting stent  placement of the diagonal. Unfortunately, she developed a right femoral pseudoaneurysm and  had to have that surgically repaired. Follow-up echocardiogram in  showed her left  ventricular function had returned to normal. She then presented on 2011, with  increasing shortness of breath and was found to have some mild congestive heart failure,  atrial flutter, and marked bradycardia. She had a repeat echocardiogram again that  confirmed normal left ventricular systolic function. She had a dual-chamber pacemaker  implanted. She continued to have episodes of atrial flutter with symptomatic dyspnea. She  underwent atrial flutter ablation in 2011.  She then presented in 2012 with dyspnea, thought that this might be heart  failure, anginal equivalence. She underwent cardiac catheterization which showed elevated  right-sided pressure with a PA systolic pressure of 53 mmHg, mean of 31 mmHg, but elevated  wedge pressure at 26. She was also  found to have in-stent stenosis of the diagonal vessel.  Other vessels were free of disease with the exception of total occlusion of the mid left  anterior descending which was unchanged. A left ventricular ejection fraction of  approximately 50%. She underwent angioplasty and drug-eluting stent placement of the  diagonal vessel. Prinivil was increased. We increased her Lasix to 30 mg a day however,  she developed dizziness on that and we had to cut her back to 20 mg of Lasix a day.  She unfortunately has had a couple of episodes of passing out. It turns out that was from low   blood pressure, and they resolved with adjusting her medications.  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 She ended up being admitted and had the device removed and cleaned  out. During that hospital stay she was found to have a nonfunctioning RV lead. She was  stable and in view of the infection, it was decided not to go and replace the lead at that  time.  She then in 10/2014 had her RV lead revised.   Then in November 2016 was having some increased shortness of breath had a perfusion stress test that showed a very small area of ischemia in the septal wall her echocardiogram showed a left ventricular ejection fraction of approximately 45% which was about her baseline no real significant valvular disease.  She isn't subsequently diagnosed as having significant gastroesophageal reflux with aspiration and been treated for that.    In December 2017 she was admitted to [facility] with a cerebral vascular accident she had an echocardiogram that confirmed severe left ventricular systolic dysfunction and ejection fraction of 23% no significant valvular disease aspirin was added to her regimen.  She's ended in our hospital with septic knees and infection treated with antibiotics and developed lymphedema of her lower extremity was seen in April 2018 emergency room for  that.  She now comes in for follow-up.  She's completed her move to an independent living at the [place] in she's doing great.  She does exercises 3 days a week, probably has to walk one to 2 miles a day.  There are times that the meals her little salty he gets some swelling.  No chest pain or pressure.  Some shortness breath with increased activity but unchanged.  No orthopnea or PND.  No palpitations, near-syncope or syncope no abrupt loss of vision, paralysis, paresthesia, or dysarthria.  No blood in her stool or black tarry stools.  No falling.  And overall she feels like she's doing well.      Atrial Fibrillation   Symptoms are negative for dizziness and weakness. Past medical history includes atrial fibrillation and CAD.   Coronary Artery Disease   Pertinent negatives include no dizziness, muscle weakness or weight gain. Her past medical history is significant for past myocardial infarction.         Past Medical History:   Diagnosis Date   • Aortic calcification (CMS/HCC)     mild, on echo 12/17/2017   • Aortic regurgitation     Trace   • CAD (coronary artery disease)    • Chronic combined systolic and diastolic congestive heart failure (CMS/HCC)    • CKD (chronic kidney disease) stage 3, GFR 30-59 ml/min (CMS/HCC)    • Coronary artery disease involving native coronary artery of native heart with angina pectoris (CMS/HCC)    • DM type 2 (diabetes mellitus, type 2) (CMS/HCC)    • Hypertension    • Mild mitral regurgitation    • Mitral annular calcification     12/8/2017- echo, moderate   • PAF (paroxysmal atrial fibrillation) (CMS/HCC)    • SSS (sick sinus syndrome) (CMS/HCC)    • Tricuspid regurgitation     Trace         Past Surgical History:   Procedure Laterality Date   • CARDIAC CATHETERIZATION     • CHOLECYSTECTOMY     • CORONARY STENT PLACEMENT     • HERNIA REPAIR     • HYSTERECTOMY     • PACEMAKER IMPLANTATION     • REPLACEMENT TOTAL KNEE           Current Outpatient Prescriptions on File Prior to  Visit   Medication Sig Dispense Refill   • acetaminophen (TYLENOL) 500 MG tablet Take 500 mg by mouth Every 6 (Six) Hours As Needed for Mild Pain .     • Acetylcysteine (N-ACETYL-L-CYSTEINE) 600 MG capsule Take  by mouth.     • aspirin 81 MG chewable tablet Chew 81 mg Daily.     • azelastine (ASTEPRO) 0.15 % solution nasal spray 2 sprays into each nostril 2 (Two) Times a Day.     • azelastine (OPTIVAR) 0.05 % ophthalmic solution Administer 1 drop to both eyes 2 (Two) Times a Day.     • Biotin 2.5 MG capsule Take 1 capsule by mouth Daily.     • Calcium Carbonate-Vitamin D (CALCIUM-VITAMIN D) 500-200 MG-UNIT tablet per tablet TAKE TWO TABLETS BY MOUTH DAILY     • carvedilol (COREG) 3.125 MG tablet Take 1 tablet by mouth 2 (Two) Times a Day. 60 tablet 11   • cetirizine (ZyrTEC) 10 MG tablet Take 10 mg by mouth Daily.     • clotrimazole-betamethasone (LOTRISONE) 1-0.05 % cream Apply 1 application topically 2 (Two) Times a Day As Needed.     • dry mouth gel (BIOTENE ORALBALANCE) gel Apply  to the mouth or throat As Needed for Dry Mouth.     • flunisolide (NASALIDE) 25 MCG/ACT (0.025%) solution nasal spray Inhale 2 sprays Every 12 (Twelve) Hours.     • furosemide (LASIX) 20 MG tablet Take 20 mg by mouth 2 (Two) Times a Day.     • glipiZIDE (GLUCOTROL) 5 MG tablet Take one half tablet by  Mouth every morning     • ketoconazole (NIZORAL) 2 % shampoo Apply  topically 2 (Two) Times a Week.     • loperamide (IMODIUM) 2 MG capsule Take 2 mg by mouth Every Other Day.     • losartan (COZAAR) 25 MG tablet TAKE ONE TABLET BY MOUTH DAILY 90 tablet 1   • Omega-3 Fatty Acids (FISH OIL) 1000 MG capsule capsule Take 1,000 mg by mouth 2 (Two) Times a Day With Meals.     • oxazepam (SERAX) 10 MG capsule Take 1 capsule by mouth Every Night. 5 capsule 0   • oxybutynin XL (DITROPAN-XL) 10 MG 24 hr tablet Take 10 mg by mouth Daily.     • rosuvastatin (CRESTOR) 20 MG tablet Take 20 mg by mouth Every Night.     • warfarin (COUMADIN) 4 MG tablet  TAKE ONE TABLET BY MOUTH DAILY 90 tablet 0     No current facility-administered medications on file prior to visit.          Social History     Social History   • Marital status: Single     Spouse name: N/A   • Number of children: N/A   • Years of education: N/A     Occupational History   • Not on file.     Social History Main Topics   • Smoking status: Never Smoker   • Smokeless tobacco: Never Used      Comment: caffeine use   • Alcohol use No   • Drug use: No   • Sexual activity: Not on file     Other Topics Concern   • Not on file     Social History Narrative   • No narrative on file       Family History   Problem Relation Age of Onset   • Heart disease Mother    • Hypertension Mother    • Heart disease Father    • Hypertension Father    • Hypertension Brother          Review of Systems   Constitution: Negative for decreased appetite, diaphoresis, fever, weakness, malaise/fatigue, weight gain and weight loss.   HENT: Negative for congestion, hearing loss, nosebleeds and tinnitus.    Eyes: Negative for blurred vision, double vision, vision loss in left eye, vision loss in right eye and visual disturbance.   Cardiovascular:        As noted in HPI   Respiratory:        As noted HPI   Endocrine: Negative for cold intolerance and heat intolerance.   Hematologic/Lymphatic: Negative for bleeding problem. Does not bruise/bleed easily.   Skin: Negative for color change, flushing, itching and rash.   Musculoskeletal: Negative for arthritis, back pain, joint pain, joint swelling, muscle weakness and myalgias.   Gastrointestinal: Negative for bloating, abdominal pain, constipation, diarrhea, dysphagia, heartburn, hematemesis, hematochezia, melena, nausea and vomiting.   Genitourinary: Positive for frequency. Negative for bladder incontinence, dysuria, nocturia and urgency.   Neurological: Positive for numbness. Negative for dizziness, focal weakness, headaches, light-headedness, loss of balance, paresthesias and vertigo.  "  Psychiatric/Behavioral: Negative for depression, memory loss and substance abuse.       Procedures      ECG 12 Lead  Date/Time: 10/19/2018 12:01 PM  Performed by: DIVYA NELSON  Authorized by: DIVYA NELSON   Comparison: compared with previous ECG   Comparison to previous ECG: Back in SR  Rhythm comments: AV paced.                  Objective:    /86 (BP Location: Right arm)   Pulse 81   Ht 165.1 cm (65\")   Wt 100 kg (221 lb)   BMI 36.78 kg/m²        Physical Exam  Physical Exam   Constitutional: She is oriented to person, place, and time. She appears well-developed and well-nourished. No distress.   HENT:   Head: Normocephalic.   Eyes: Pupils are equal, round, and reactive to light. Conjunctivae are normal. No scleral icterus.   Neck: Normal carotid pulses, no hepatojugular reflux and no JVD present. Carotid bruit is not present. No tracheal deviation, no edema and no erythema present. No thyromegaly present.   Cardiovascular: Normal rate, S1 normal, S2 normal and intact distal pulses.  An irregularly irregular rhythm present.  No extrasystoles are present. PMI is not displaced.  Exam reveals no gallop, no distant heart sounds and no friction rub.    Murmur heard.   Systolic murmur is present with a grade of 2/6  at the upper right sternal border  Pulses:       Carotid pulses are 2+ on the right side, and 2+ on the left side.       Radial pulses are 2+ on the right side, and 2+ on the left side.        Femoral pulses are 2+ on the right side, and 2+ on the left side.       Dorsalis pedis pulses are 2+ on the right side, and 2+ on the left side.        Posterior tibial pulses are 2+ on the right side, and 2+ on the left side.   Pulmonary/Chest: Effort normal and breath sounds normal. No respiratory distress. She has no decreased breath sounds. She has no wheezes. She has no rhonchi. She has no rales. She exhibits no tenderness.   Abdominal: Soft. Bowel sounds are normal. She exhibits no distension " and no mass. There is no hepatosplenomegaly. There is no tenderness. There is no rebound and no guarding.   Musculoskeletal: She exhibits edema (1+ bilateral tibial). She exhibits no tenderness or deformity.   Neurological: She is alert and oriented to person, place, and time.   Skin: Skin is warm and dry. No rash noted. She is not diaphoretic. No cyanosis or erythema. No pallor. Nails show no clubbing.   Psychiatric: She has a normal mood and affect. Her speech is normal and behavior is normal. Judgment and thought content normal.           Assessment:   1. This is a 84-year-old female with history of coronary disease over the mid left anterior descending with total occlusion of that. First diagonal vessel with severe  disease status post angioplasty and drug-eluting stent placement of that. Followup catheterization in 10/12 showed restenosis of the diagonal vessel with repeat angioplasty  and drug-eluting stent placement of that. Left ventricular ejection fraction of approximately 50% with segmental wall motion abnormality but at the time of that catheterization did  have elevated pulmonary artery pressure as well as pulmonary capillary wedge pressure. Echocardiogram May 2018 left ventricular ejection fraction improved from 23% to 35% to 40%.  No evidence of heart failure.   Coronary Artery Disease  Assessment  • The patient has no angina  • On warfarin     Plan  • Lifestyle modifications discussed include adhering to a heart healthy diet, avoidance of tobacco products, maintenance of a healthy weight, medication compliance, regular exercise and regular monitoring of cholesterol and blood pressure     Subjective - Objective  • There is a history of past MI  • There has been a previous stent procedure using LOLA  • Current antiplatelet therapy includes aspirin 81 mg   Stable.       2. Atrial fibrillation/sick sinus syndrome status post permanent pacemaker implantation status post flutter ablation. S/P RV lead  revision.   Atrial Fibrillation and Atrial Flutter  Assessment  • The patient has persistent atrial fibrillation  • This is non-valvular in etiology  • The patient's CHADS2-VASc score is 9  • A [id number] score of 2 or more is considered a high risk for a thromboembolic event  • Warfarin prescribed     Plan  • Continue in atrial fibrillation with rate control  • Continue warfarin for antithrombotic therapy, bleeding issues discussed  • Continue beta blocker for rhythm control  And atrial fib flutter today continue the same rate controlled.      3. Diabetes mellitus, followed in your office.   4. Hypertensive. Blood pressure adequately controlled.   5. Hyperlipidemia. She is following with her PCP on target dose rosuvastatin.  6. History of sleep apnea, on CPAP.   7. History of pulmonary hypertension, follows with pulmonary.   8. Obesity as above.  9. Syncope. Secondary to hypotension. No recurrence.   10.  Marked dyspnea.  Most likely secondary to her paralyzed left hemidiaphragm reflux and aspiration as well as underlying lung and heart disease.    11.  Cerebral vascular accident most likely embolic from her atrial fibrillation now on aspirin.  She is having some issues with some bruising but again I think the risk of stopping aspirin or 2 great.  12.  Recurrent infection I believe in her knee replacement now receiving antibiotics following with orthopedics as well as infectious disease.  13.  Lower extremity edema she had gone to lymphedema clinic.  This now is stable   14.  Ischemic cardiomyopathy left ventricular ejection fraction now 35-40% moderately reduced she is weighing herself daily those are stable she's to continue the same.   much improved since moving in independent living she's to continue the same.       Plan:

## 2018-11-07 ENCOUNTER — ANTICOAGULATION VISIT (OUTPATIENT)
Dept: PHARMACY | Facility: HOSPITAL | Age: 83
End: 2018-11-07

## 2018-11-07 DIAGNOSIS — I48.0 PAF (PAROXYSMAL ATRIAL FIBRILLATION) (HCC): ICD-10-CM

## 2018-11-07 LAB
INR PPP: 2.21 (ref 0.9–1.1)
PROTHROMBIN TIME: 24.1 SECONDS (ref 11.7–14.2)

## 2018-11-07 PROCEDURE — G0463 HOSPITAL OUTPT CLINIC VISIT: HCPCS

## 2018-11-07 PROCEDURE — 85610 PROTHROMBIN TIME: CPT

## 2018-11-07 PROCEDURE — 36415 COLL VENOUS BLD VENIPUNCTURE: CPT

## 2018-11-07 RX ORDER — CIPROFLOXACIN 500 MG/1
500 TABLET, FILM COATED ORAL 2 TIMES DAILY
COMMUNITY
Start: 2018-11-06 | End: 2018-11-13

## 2018-11-07 NOTE — PROGRESS NOTES
Anticoagulation Clinic Progress Note    Anticoagulation Summary  As of 11/7/2018    INR goal:   2.0-3.0   TTR:   100.0 % (1.6 mo)   Today's INR:   2.21   Warfarin maintenance plan:   2 mg on Tue, Thu, Sat; 4 mg all other days   Weekly warfarin total:   22 mg   Plan last modified:   Ana Lewis RPH (9/10/2018)   Next INR check:   11/14/2018   Priority:   High   Target end date:   Indefinite    Indications    PAF (paroxysmal atrial fibrillation) (CMS/HCC) [I48.0]             Anticoagulation Episode Summary     INR check location:       Preferred lab:       Send INR reminders to:    [facility] [place]    Comments:         Anticoagulation Care Providers     Provider Role Specialty Phone number    Rommel Veliz MD Referring Cardiology [phone]    Ana Lewis RPH Responsible Pharmacy           Drug interactions: has changed in the following manner Started cipro 500mg BID x 7d on 11/6.   Diet: has remained unchanged.    Clinic Interview:  Patient Findings     Positives:   Change in medications    Negatives:   Signs/symptoms of thrombosis, Signs/symptoms of bleeding,   Laboratory test error suspected, Change in health, Change in alcohol use,   Change in activity, Upcoming invasive procedure, Emergency department   visit, Upcoming dental procedure, Missed doses, Extra doses, Change in   diet/appetite, Hospital admission, Bruising, Other complaints    Comments:   Started cipro 500mg BID x 7 days yesterday      Clinical Outcomes     Negatives:   Major bleeding event, Thromboembolic event,   Anticoagulation-related hospital admission, Anticoagulation-related ED   visit, Anticoagulation-related fatality    Comments:   Started cipro 500mg BID x 7 days yesterday        INR History:  Anticoagulation Monitoring 9/10/2018 10/10/2018 11/7/2018   INR 2.0 2.0 2.21   INR Date 9/10/2018 10/10/2018 11/7/2018   INR Goal 2.0-3.0 2.0-3.0 2.0-3.0   Trend - Same Same   Last Week Total 0 mg 22 mg 22 mg   Next Week  Total 22 mg 22 mg 22 mg   Sun 4 mg 4 mg 4 mg   Mon 4 mg 4 mg 4 mg   Tue 2 mg 2 mg 2 mg   Wed 4 mg 4 mg 4 mg   Thu 2 mg 2 mg 2 mg   Fri 4 mg 4 mg 4 mg   Sat 2 mg 2 mg 2 mg   Visit Report - - -   Some recent data might be hidden       Previous INR data from [facility] is recorded in [facility] and scanned into the patient's chart in [facility]. These results have been analyzed and reviewed.      Plan:  1. INR is Therapeutic today- see above in Anticoagulation Summary.  Will instruct Caitlyn Longoria to Continue their warfarin regimen- see above in Anticoagulation Summary.  2. Follow up in 1 month.  3. Patient declines warfarin refills.  4. Verbal and written information provided. Patient expresses understanding and has no further questions at this time.    Pao Levi [facility]

## 2018-11-07 NOTE — PROGRESS NOTES
All Roche [employer]ag[employer] INR strips have been recalled by the FDA and patients have to have a lab draw until new strips are supplied. Patient was educated and appropriate questions were asked regarding warfarin. We will call the patient with the lab results and proceed with dosing from there.

## 2018-11-12 RX ORDER — LOSARTAN POTASSIUM 25 MG/1
TABLET ORAL
Qty: 90 TABLET | Refills: 3 | Status: SHIPPED | OUTPATIENT
Start: 2018-11-12 | End: 2019-09-16

## 2018-11-21 ENCOUNTER — ANTICOAGULATION VISIT (OUTPATIENT)
Dept: PHARMACY | Facility: HOSPITAL | Age: 83
End: 2018-11-21

## 2018-11-21 DIAGNOSIS — I48.0 PAF (PAROXYSMAL ATRIAL FIBRILLATION) (HCC): ICD-10-CM

## 2018-11-21 LAB
INR PPP: 1.8 (ref 0.91–1.09)
PROTHROMBIN TIME: 21.8 SECONDS (ref 10–13.8)

## 2018-11-21 PROCEDURE — G0463 HOSPITAL OUTPT CLINIC VISIT: HCPCS

## 2018-11-21 PROCEDURE — 36416 COLLJ CAPILLARY BLOOD SPEC: CPT

## 2018-11-21 PROCEDURE — 85610 PROTHROMBIN TIME: CPT

## 2018-11-21 NOTE — PROGRESS NOTES
Anticoagulation Clinic Progress Note    Anticoagulation Summary  As of 2018    INR goal:   2.0-3.0   TTR:   89.1 % (2.1 mo)   INR used for dosin.8! (2018)   Warfarin maintenance plan:   2 mg on Tue, Thu, Sat; 4 mg all other days   Weekly warfarin total:   22 mg   No change documented:   Pao Levi RPH   Plan last modified:   Ana Lewis RPH (9/10/2018)   Next INR check:   2018   Priority:   High   Target end date:   Indefinite    Indications    PAF (paroxysmal atrial fibrillation) (CMS/HCC) [I48.0]             Anticoagulation Episode Summary     INR check location:       Preferred lab:       Send INR reminders to:    JACEY SINCLA[facility]    Comments:         Anticoagulation Care Providers     Provider Role Specialty Phone number    Rommel Veliz MD Referring Cardiology [phone]    Ana Lewis RPH Responsible Pharmacy [phone]          Drug interactions: has changed in the following manner second course of Cipro.  Diet: has remained unchanged.    Clinic Interview:  Patient Findings     Positives:   Signs/symptoms of bleeding, Change in medications    Comments:   Nose bleed for 45min ; second round of Cipro      Clinical Outcomes     Comments:   Nose bleed for 45min ; second round of Cipro        INR History:  Anticoagulation Monitoring 10/10/2018 2018 2018   INR 2.0 2.21 1.8   INR Date 10/10/2018 2018 2018   INR Goal 2.0-3.0 2.0-3.0 2.0-3.0   Trend Same Same Same   Last Week Total 22 mg 22 mg 22 mg   Next Week Total 22 mg 22 mg 22 mg   Sun 4 mg 4 mg 4 mg   Mon 4 mg 4 mg 4 mg   Tue 2 mg 2 mg 2 mg   Wed 4 mg 4 mg 4 mg   Thu 2 mg 2 mg 2 mg   Fri 4 mg 4 mg 4 mg   Sat 2 mg 2 mg 2 mg   Visit Report - - -   Some recent data might be hidden       Previous INR data from [facility] is recorded in [facility] and scanned into the patient's chart in [facility]. These results have been analyzed and reviewed.      Plan:  1. INR is  Subtherapeutic today- see above in Anticoagulation Summary.  Will instruct Caitlyn GARRETT Longoria to Continue their warfarin regimen- see above in Anticoagulation Summary.  2. Follow up in 2 weeks  3. Patient declines warfarin refills.  4. Verbal and written information provided. Patient expresses understanding and has no further questions at this time.    Pao Levi [facility]

## 2018-12-05 ENCOUNTER — ANTICOAGULATION VISIT (OUTPATIENT)
Dept: PHARMACY | Facility: HOSPITAL | Age: 83
End: 2018-12-05

## 2018-12-05 DIAGNOSIS — I48.0 PAF (PAROXYSMAL ATRIAL FIBRILLATION) (HCC): ICD-10-CM

## 2018-12-05 LAB
INR PPP: 1.8 (ref 0.91–1.09)
PROTHROMBIN TIME: 22.1 SECONDS (ref 10–13.8)

## 2018-12-05 PROCEDURE — G0463 HOSPITAL OUTPT CLINIC VISIT: HCPCS

## 2018-12-05 PROCEDURE — 85610 PROTHROMBIN TIME: CPT

## 2018-12-05 PROCEDURE — 36416 COLLJ CAPILLARY BLOOD SPEC: CPT

## 2018-12-05 NOTE — PROGRESS NOTES
Anticoagulation Clinic Progress Note    Anticoagulation Summary  As of 2018    INR goal:   2.0-3.0   TTR:   72.7 % (2.5 mo)   INR used for dosin.8! (2018)   Warfarin maintenance plan:   2 mg on Tue, Sat; 4 mg all other days   Weekly warfarin total:   24 mg   Plan last modified:   Mike Jordan [facility] (2018)   Next INR check:   2018   Priority:   High   Target end date:   Indefinite    Indications    PAF (paroxysmal atrial fibrillation) (CMS/HCC) [I48.0]             Anticoagulation Episode Summary     INR check location:       Preferred lab:       [facility] [place]    Comments:         Anticoagulation Care Providers     Provider Role Specialty Phone number    Rommel Veliz MD Referring Cardiology [phone]          Drug interactions: has remained unchanged.  Diet: has remained unchanged.    Clinic Interview:  Patient Findings     Positives:   Bruising    Negatives:   Signs/symptoms of thrombosis, Signs/symptoms of bleeding,   Laboratory test error suspected, Change in health, Change in alcohol use,   Change in activity, Upcoming invasive procedure, Emergency department   visit, Upcoming dental procedure, Missed doses, Extra doses, Change in   medications, Change in diet/appetite, Hospital admission, Other complaints      Comments:   Having some minor bruising, but states it is going away      Clinical Outcomes     Negatives:   Major bleeding event, Thromboembolic event,   Anticoagulation-related hospital admission, Anticoagulation-related ED   visit, Anticoagulation-related fatality    Comments:   Having some minor bruising, but states it is going away        INR History:  Anticoagulation Monitoring 2018   INR 2.21 1.8 1.8   INR Date 2018   INR Goal 2.0-3.0 2.0-3.0 2.0-3.0   Trend Same Same Up   Last Week Total 22 mg 22 mg 22 mg   Next Week Total 22 mg 22 mg 24 mg   Sun 4 mg 4 mg 4 mg   Mon 4 mg  4 mg 4 mg   Tue 2 mg 2 mg 2 mg   Wed 4 mg 4 mg 4 mg   Thu 2 mg 2 mg 4 mg   Fri 4 mg 4 mg 4 mg   Sat 2 mg 2 mg 2 mg   Visit Report - - -   Some recent data might be hidden       Plan:  1. INR is Subtherapeutic today- see above in Anticoagulation Summary.  Will instruct Caitlyn TCUKER Rajaydonmaxx to Increase their warfarin regimen- see above in Anticoagulation Summary.  2. Follow up in 3 weeks. Pt refused a 2 week follow up.  3. Patient declines warfarin refills.  4. Verbal and written information provided. Patient expresses understanding and has no further questions at this time.    Mike Jordan [facility]

## 2018-12-26 ENCOUNTER — ANTICOAGULATION VISIT (OUTPATIENT)
Dept: PHARMACY | Facility: HOSPITAL | Age: 83
End: 2018-12-26

## 2018-12-26 DIAGNOSIS — I48.0 PAF (PAROXYSMAL ATRIAL FIBRILLATION) (HCC): ICD-10-CM

## 2018-12-26 LAB
INR PPP: 2.5 (ref 0.91–1.09)
PROTHROMBIN TIME: 29.6 SECONDS (ref 10–13.8)

## 2018-12-26 PROCEDURE — 85610 PROTHROMBIN TIME: CPT

## 2018-12-26 PROCEDURE — 36416 COLLJ CAPILLARY BLOOD SPEC: CPT

## 2018-12-26 NOTE — PROGRESS NOTES
Anticoagulation Clinic Progress Note    Anticoagulation Summary  As of 2018    INR goal:   2.0-3.0   TTR:   72.4 % (3.2 mo)   INR used for dosin.5 (2018)   Warfarin maintenance plan:   2 mg on Tue, Sat; 4 mg all other days   Weekly warfarin total:   24 mg   No change documented:   Sheri Vinson   Plan last modified:   Mike Jordan RP (2018)   Next INR check:   2019   Priority:   Maintenance   Target end date:   Indefinite    Indications    PAF (paroxysmal atrial fibrillation) (CMS/[facility]) [I48.0]             Anticoagulation Episode Summary     INR check location:       Preferred lab:       Send INR reminders to:    [facility]    Comments:         Anticoagulation Care Providers     Provider Role Specialty Phone number    Rommel Veliz MD Referring Cardiology [phone]          Clinic Interview:  Patient Findings     Negatives:   Signs/symptoms of thrombosis, Signs/symptoms of bleeding,   Laboratory test error suspected, Change in health, Change in alcohol use,   Change in activity, Upcoming invasive procedure, Emergency department   visit, Upcoming dental procedure, Missed doses, Extra doses, Change in   medications, Change in diet/appetite, Hospital admission, Bruising, Other   complaints      Clinical Outcomes     Negatives:   Major bleeding event, Thromboembolic event,   Anticoagulation-related hospital admission, Anticoagulation-related ED   visit, Anticoagulation-related fatality        INR History:  Anticoagulation Monitoring 2018   INR 1.8 1.8 2.5   INR Date 2018   INR Goal 2.0-3.0 2.0-3.0 2.0-3.0   Trend Same Up Same   Last Week Total 22 mg 22 mg 24 mg   Next Week Total 22 mg 24 mg 24 mg   Sun 4 mg 4 mg 4 mg   Mon 4 mg 4 mg 4 mg   Tue 2 mg 2 mg 2 mg   Wed 4 mg 4 mg 4 mg   Thu 2 mg 4 mg 4 mg   Fri 4 mg 4 mg 4 mg   Sat 2 mg 2 mg 2 mg   Visit Report - - -   Some recent data might be hidden        Plan:  1. INR is therapeutic today- see above in Anticoagulation Summary.   Will instruct Caitlyn TUCKER Rasabina to continue their warfarin regimen- see above in Anticoagulation Summary.  2. Follow up in 4 weeks.  3. Patient declines warfarin refills.  4. Verbal and written information provided. Patient expresses understanding and has no further questions at this time.    Sheri Vinson

## 2019-01-21 RX ORDER — WARFARIN SODIUM 4 MG/1
TABLET ORAL
Qty: 90 TABLET | Refills: 0 | Status: SHIPPED | OUTPATIENT
Start: 2019-01-21 | End: 2019-05-05 | Stop reason: SDUPTHER

## 2019-01-22 ENCOUNTER — CLINICAL SUPPORT NO REQUIREMENTS (OUTPATIENT)
Dept: CARDIOLOGY | Facility: CLINIC | Age: 84
End: 2019-01-22

## 2019-01-22 DIAGNOSIS — I44.2 THIRD DEGREE AV BLOCK (HCC): ICD-10-CM

## 2019-01-22 DIAGNOSIS — I49.5 SICK SINUS SYNDROME (HCC): Primary | ICD-10-CM

## 2019-01-22 PROCEDURE — 93296 REM INTERROG EVL PM/IDS: CPT | Performed by: INTERNAL MEDICINE

## 2019-01-22 PROCEDURE — 93294 REM INTERROG EVL PM/LDLS PM: CPT | Performed by: INTERNAL MEDICINE

## 2019-01-23 ENCOUNTER — ANTICOAGULATION VISIT (OUTPATIENT)
Dept: PHARMACY | Facility: HOSPITAL | Age: 84
End: 2019-01-23

## 2019-01-23 DIAGNOSIS — I48.0 PAF (PAROXYSMAL ATRIAL FIBRILLATION) (HCC): ICD-10-CM

## 2019-01-23 LAB
INR PPP: 2.3 (ref 0.91–1.09)
PROTHROMBIN TIME: 27.7 SECONDS (ref 10–13.8)

## 2019-01-23 PROCEDURE — 36416 COLLJ CAPILLARY BLOOD SPEC: CPT

## 2019-01-23 PROCEDURE — 85610 PROTHROMBIN TIME: CPT

## 2019-01-23 NOTE — PROGRESS NOTES
Anticoagulation Clinic Progress Note    Anticoagulation Summary  As of 2019    INR goal:   2.0-3.0   TTR:   78.6 % (4.2 mo)   INR used for dosin.3 (2019)   Warfarin maintenance plan:   2 mg on Tue, Sat; 4 mg all other days   Weekly warfarin total:   24 mg   No change documented:   Yumiko Avilez   Plan last modified:   Mike Jordan RPH (2018)   Next INR check:   2019   Priority:   Maintenance   Target end date:   Indefinite    Indications    PAF (paroxysmal atrial fibrillation) (CMS/[facility]) [I48.0]             Anticoagulation Episode Summary     INR check location:       Preferred lab:       Send INR reminders to:    [facility]    Comments:         Anticoagulation Care Providers     Provider Role Specialty Phone number    Rommel Veliz MD Referring Cardiology [phone]          Clinic Interview:  Patient Findings     Negatives:   Signs/symptoms of thrombosis, Signs/symptoms of bleeding,   Laboratory test error suspected, Change in health, Change in alcohol use,   Change in activity, Upcoming invasive procedure, Emergency department   visit, Upcoming dental procedure, Missed doses, Extra doses, Change in   medications, Change in diet/appetite, Hospital admission, Bruising, Other   complaints      Clinical Outcomes     Negatives:   Major bleeding event, Thromboembolic event,   Anticoagulation-related hospital admission, Anticoagulation-related ED   visit, Anticoagulation-related fatality        INR History:  Anticoagulation Monitoring 2018   INR 1.8 2.5 2.3   INR Date 2018   INR Goal 2.0-3.0 2.0-3.0 2.0-3.0   Trend Up Same Same   Last Week Total 22 mg 24 mg 24 mg   Next Week Total 24 mg 24 mg 24 mg   Sun 4 mg 4 mg 4 mg   Mon 4 mg 4 mg 4 mg   Tue 2 mg 2 mg 2 mg   Wed 4 mg 4 mg 4 mg   Thu 4 mg 4 mg 4 mg   Fri 4 mg 4 mg 4 mg   Sat 2 mg 2 mg 2 mg   Visit Report - - -   Some recent data might be hidden        Plan:  1. INR is therapeutic today- see above in Anticoagulation Summary.   Will instruct Caitlyn TUCKER Rasabina to continue their warfarin regimen- see above in Anticoagulation Summary.  2. Follow up in 4 weeks.  3. Patient declines warfarin refills.  4. Verbal and written information provided. Patient expresses understanding and has no further questions at this time.    Yumiko Avilez

## 2019-01-29 ENCOUNTER — HOSPITAL ENCOUNTER (OUTPATIENT)
Dept: CARDIOLOGY | Facility: HOSPITAL | Age: 84
Discharge: HOME | End: 2019-01-29
Admitting: NURSE PRACTITIONER

## 2019-01-29 DIAGNOSIS — I63.032 CEREBROVASCULAR ACCIDENT (CVA) DUE TO THROMBOSIS OF LEFT CAROTID ARTERY (HCC): ICD-10-CM

## 2019-01-29 LAB
BH CV XLRA MEAS LEFT DIST CCA EDV: -19.9 CM/SEC
BH CV XLRA MEAS LEFT DIST CCA PSV: -79.2 CM/SEC
BH CV XLRA MEAS LEFT DIST ICA EDV: -11.1 CM/SEC
BH CV XLRA MEAS LEFT DIST ICA PSV: -77.4 CM/SEC
BH CV XLRA MEAS LEFT ICA/CCA RATIO: 0.97
BH CV XLRA MEAS LEFT MID ICA EDV: 24 CM/SEC
BH CV XLRA MEAS LEFT MID ICA PSV: 81.5 CM/SEC
BH CV XLRA MEAS LEFT PROX CCA EDV: 15.8 CM/SEC
BH CV XLRA MEAS LEFT PROX CCA PSV: 76.2 CM/SEC
BH CV XLRA MEAS LEFT PROX ECA EDV: -17.3 CM/SEC
BH CV XLRA MEAS LEFT PROX ECA PSV: -111 CM/SEC
BH CV XLRA MEAS LEFT PROX ICA EDV: -17 CM/SEC
BH CV XLRA MEAS LEFT PROX ICA PSV: -76.2 CM/SEC
BH CV XLRA MEAS LEFT PROX SCLA PSV: -78.6 CM/SEC
BH CV XLRA MEAS LEFT VERTEBRAL A EDV: 11.2 CM/SEC
BH CV XLRA MEAS LEFT VERTEBRAL A PSV: 32.6 CM/SEC
BH CV XLRA MEAS RIGHT DIST CCA EDV: -16.1 CM/SEC
BH CV XLRA MEAS RIGHT DIST CCA PSV: -66.4 CM/SEC
BH CV XLRA MEAS RIGHT DIST ICA EDV: -15.3 CM/SEC
BH CV XLRA MEAS RIGHT DIST ICA PSV: -59.7 CM/SEC
BH CV XLRA MEAS RIGHT ICA/CCA RATIO: 1.11
BH CV XLRA MEAS RIGHT MID ICA EDV: 16.4 CM/SEC
BH CV XLRA MEAS RIGHT MID ICA PSV: 56.3 CM/SEC
BH CV XLRA MEAS RIGHT PROX CCA EDV: -15.8 CM/SEC
BH CV XLRA MEAS RIGHT PROX CCA PSV: -82.7 CM/SEC
BH CV XLRA MEAS RIGHT PROX ECA EDV: -9.4 CM/SEC
BH CV XLRA MEAS RIGHT PROX ECA PSV: -72.7 CM/SEC
BH CV XLRA MEAS RIGHT PROX ICA EDV: -15.3 CM/SEC
BH CV XLRA MEAS RIGHT PROX ICA PSV: -58.5 CM/SEC
BH CV XLRA MEAS RIGHT PROX SCLA PSV: 117 CM/SEC
BH CV XLRA MEAS RIGHT VERTEBRAL A EDV: -10.5 CM/SEC
BH CV XLRA MEAS RIGHT VERTEBRAL A PSV: -26.9 CM/SEC
LEFT ARM BP: NORMAL MMHG
RIGHT ARM BP: NORMAL MMHG

## 2019-01-29 PROCEDURE — 93880 EXTRACRANIAL BILAT STUDY: CPT

## 2019-02-05 ENCOUNTER — OFFICE VISIT (OUTPATIENT)
Dept: NEUROLOGY | Facility: CLINIC | Age: 84
End: 2019-02-05

## 2019-02-05 VITALS
BODY MASS INDEX: 36.49 KG/M2 | HEIGHT: 65 IN | OXYGEN SATURATION: 94 % | HEART RATE: 80 BPM | DIASTOLIC BLOOD PRESSURE: 78 MMHG | SYSTOLIC BLOOD PRESSURE: 130 MMHG | WEIGHT: 219 LBS

## 2019-02-05 DIAGNOSIS — E53.8 LOW SERUM VITAMIN B12: ICD-10-CM

## 2019-02-05 DIAGNOSIS — I10 ESSENTIAL HYPERTENSION: ICD-10-CM

## 2019-02-05 DIAGNOSIS — E66.01 MORBIDLY OBESE (HCC): ICD-10-CM

## 2019-02-05 DIAGNOSIS — E11.59 TYPE 2 DIABETES MELLITUS WITH OTHER CIRCULATORY COMPLICATION, UNSPECIFIED WHETHER LONG TERM INSULIN USE (HCC): ICD-10-CM

## 2019-02-05 DIAGNOSIS — E78.5 HYPERLIPIDEMIA, UNSPECIFIED HYPERLIPIDEMIA TYPE: ICD-10-CM

## 2019-02-05 DIAGNOSIS — I48.0 PAF (PAROXYSMAL ATRIAL FIBRILLATION) (HCC): ICD-10-CM

## 2019-02-05 DIAGNOSIS — I65.21 INTERNAL CAROTID ARTERY STENOSIS, RIGHT: Primary | ICD-10-CM

## 2019-02-05 PROCEDURE — 99213 OFFICE O/P EST LOW 20 MIN: CPT | Performed by: NURSE PRACTITIONER

## 2019-02-05 RX ORDER — POTASSIUM CHLORIDE 20 MEQ/1
TABLET, EXTENDED RELEASE ORAL
COMMUNITY
Start: 2012-10-11 | End: 2019-03-07

## 2019-02-05 RX ORDER — CLOBETASOL PROPIONATE 0.46 MG/ML
SOLUTION TOPICAL
COMMUNITY
Start: 2018-11-14 | End: 2019-09-16

## 2019-02-05 RX ORDER — PANTOPRAZOLE SODIUM 40 MG/1
40 TABLET, DELAYED RELEASE ORAL
COMMUNITY
Start: 2017-05-16 | End: 2019-03-07

## 2019-02-05 RX ORDER — LANOLIN ALCOHOL/MO/W.PET/CERES
1000 CREAM (GRAM) TOPICAL DAILY
Qty: 30 TABLET | Refills: 12 | Status: SHIPPED | OUTPATIENT
Start: 2019-02-05 | End: 2019-09-16

## 2019-02-05 RX ORDER — ASPIRIN 325 MG
325 TABLET ORAL DAILY
COMMUNITY
Start: 2017-12-11 | End: 2019-03-07

## 2019-02-05 RX ORDER — PHENAZOPYRIDINE HYDROCHLORIDE 200 MG/1
TABLET, FILM COATED ORAL
COMMUNITY
Start: 2018-10-29 | End: 2019-03-07

## 2019-02-05 RX ORDER — UREA 10 %
3 LOTION (ML) TOPICAL DAILY
COMMUNITY
Start: 2014-12-04 | End: 2019-03-07

## 2019-02-05 RX ORDER — MELATONIN
1000 DAILY
COMMUNITY
End: 2019-03-19

## 2019-02-05 NOTE — PROGRESS NOTES
DOS: 2019  NAME: Caitlyn Longoria   : 1934  PCP: Zenaida Suarez MD    Chief Complaint   Patient presents with   • Cerebrovascular Accident          Neurological Problem and Interval History:  84 y.o. right-handed  female with a Hx of atrial fibrillation, carotid stenosis, diabetes mellitus, hyperlipidemia, hypertension and CAD who I am seeing today in follow for stroke.Ms. Longoria was last seen by my colleague KRISTIN Anne on 2018.     On 2017 she presented to [facility] d/t speech difficulty which had been present for 2 day PTA. She reported that the speech difficulty was sudden in onset. At the time of the event she was on coumadin; INR therapeutic. She was unable to have MRI d/t presence of pacemaker. CTA Head and neck revealed no acute infarct alhtough it did show ulcerated plaque in the LICA  w/o significant stenosis. She was on ASA/Tricor at the time of the event. She was subsequently discharged home on Plavix, Crestor and Coumadin. Plavix was d/c'd d/t red eye and nose bleeds. She is currently taking ASA 81mg, Coumadin and Crestor 20mg. Her INR has consistently been therapeutic. During her last visit she reported blurred vision, issues w/ balance and trouble w/ getting words out; all of which she reports is markedly improved.     Since d/c she denies any new s/s of stroke.  She has had not falls. She uses a walker to ambulate and has hearing aids.  -130s/70-80s consistently. She is aware of goal SBP <130. She reports poor quality of sleep which she attributes to new living situation. She has lots of light streaming into her bedroom window. She no lives in the [facility] which she says makes her happy. She has always has something to do and has made lots of new friends. She denies any concern for depression. She has not complaints other than bilateral wrist pain; known carpal tunnel. She uses wrist splints which help. She denies desire to have a release at this  "time.     Most recent Carotid US 01/29/2019 revealed LICA normal and SOHAM w/ mild stenosis and \"tortusious vessels\". Patient reports that her PCP called to ask if we had referred her to vascular. She would like referral to get their opinion of the carotid US findings given that Dr. Orellana is no longer w/ our practice. Reviewed 12/8/2017 Carotid US for comparison       Review of Systems:        Review of Systems   Constitutional: Negative for chills, fatigue and fever.   HENT: Positive for hearing loss (hearing aids) and trouble swallowing. Negative for tinnitus.    Eyes: Positive for visual disturbance (blurry). Negative for pain and itching.   Respiratory: Negative for cough, shortness of breath and wheezing.    Cardiovascular: Positive for leg swelling. Negative for chest pain and palpitations.   Gastrointestinal: Negative for constipation, diarrhea, nausea and vomiting.   Endocrine: Negative for cold intolerance, heat intolerance and polydipsia.   Genitourinary: Negative for decreased urine volume, difficulty urinating, frequency and urgency.   Musculoskeletal: Positive for gait problem and neck pain. Negative for back pain and neck stiffness.   Skin: Negative for color change, rash and wound.   Allergic/Immunologic: Negative for environmental allergies, food allergies and immunocompromised state.   Neurological: Positive for weakness and numbness (fingers). Negative for tremors, seizures, syncope, facial asymmetry, speech difficulty, light-headedness and headaches.   Hematological: Negative for adenopathy. Bruises/bleeds easily.   Psychiatric/Behavioral: Positive for sleep disturbance. Negative for agitation, behavioral problems, confusion, decreased concentration, dysphoric mood, hallucinations, self-injury and suicidal ideas. The patient is not nervous/anxious and is not hyperactive.          Current Outpatient Medications:   •  acetaminophen (TYLENOL) 500 MG tablet, Take 500 mg by mouth Every 6 (Six) " Hours As Needed for Mild Pain ., Disp: , Rfl:   •  Acetylcysteine (N-ACETYL-L-CYSTEINE) 600 MG capsule, Take  by mouth., Disp: , Rfl:   •  ASPIRIN 81 PO, Take 81 mg by mouth Daily., Disp: , Rfl:   •  azelastine (ASTEPRO) 0.15 % solution nasal spray, 2 sprays into each nostril 2 (Two) Times a Day., Disp: , Rfl:   •  azelastine (OPTIVAR) 0.05 % ophthalmic solution, Administer 1 drop to both eyes 2 (Two) Times a Day., Disp: , Rfl:   •  Biotin 2.5 MG capsule, Take 1 capsule by mouth Daily., Disp: , Rfl:   •  Calcium Carbonate-Vitamin D (CALCIUM-VITAMIN D) 500-200 MG-UNIT tablet per tablet, TAKE TWO TABLETS BY MOUTH DAILY, Disp: , Rfl:   •  carvedilol (COREG) 3.125 MG tablet, Take 1 tablet by mouth 2 (Two) Times a Day., Disp: 60 tablet, Rfl: 11  •  cefadroxil (DURICEF) 500 MG capsule, Take 500 mg by mouth Every 12 (Twelve) Hours., Disp: , Rfl:   •  cetirizine (ZyrTEC) 10 MG tablet, Take 10 mg by mouth Daily., Disp: , Rfl:   •  cholecalciferol (VITAMIN D3) 1000 units tablet, Take 1,000 Units by mouth Daily., Disp: , Rfl:   •  clobetasol (TEMOVATE) 0.05 % external solution, , Disp: , Rfl:   •  clotrimazole-betamethasone (LOTRISONE) 1-0.05 % cream, Apply 1 application topically 2 (Two) Times a Day As Needed., Disp: , Rfl:   •  dry mouth gel (BIOTENE ORALBALANCE) gel, Apply  to the mouth or throat As Needed for Dry Mouth., Disp: , Rfl:   •  flunisolide (NASALIDE) 25 MCG/ACT (0.025%) solution nasal spray, Inhale 2 sprays Every 12 (Twelve) Hours., Disp: , Rfl:   •  furosemide (LASIX) 20 MG tablet, Take 20 mg by mouth 2 (Two) Times a Day., Disp: , Rfl:   •  glipiZIDE (GLUCOTROL) 5 MG tablet, Take one half tablet by  Mouth every morning, Disp: , Rfl:   •  ketoconazole (NIZORAL) 2 % shampoo, Apply  topically 2 (Two) Times a Week., Disp: , Rfl:   •  loperamide (IMODIUM) 2 MG capsule, Take 2 mg by mouth Every Other Day., Disp: , Rfl:   •  losartan (COZAAR) 25 MG tablet, TAKE ONE TABLET BY MOUTH DAILY, Disp: 90 tablet, Rfl: 3  •   "melatonin 1 MG tablet, Take 3 mg by mouth Daily., Disp: , Rfl:   •  Omega-3 Fatty Acids (FISH OIL) 1000 MG capsule capsule, Take 1,000 mg by mouth 2 (Two) Times a Day With Meals., Disp: , Rfl:   •  oxazepam (SERAX) 10 MG capsule, Take 1 capsule by mouth Every Night., Disp: 5 capsule, Rfl: 0  •  oxybutynin XL (DITROPAN-XL) 10 MG 24 hr tablet, Take 10 mg by mouth Daily., Disp: , Rfl:   •  pantoprazole (PROTONIX) 40 MG EC tablet, Take 40 mg by mouth., Disp: , Rfl:   •  phenazopyridine (PYRIDIUM) 200 MG tablet, , Disp: , Rfl:   •  potassium chloride (K-DUR,KLOR-CON) 20 MEQ CR tablet, Take  by mouth., Disp: , Rfl:   •  rosuvastatin (CRESTOR) 20 MG tablet, Take 20 mg by mouth Every Night., Disp: , Rfl:   •  warfarin (COUMADIN) 4 MG tablet, TAKE ONE TABLET BY MOUTH DAILY, Disp: 90 tablet, Rfl: 0  •  aspirin 325 MG tablet, Take 325 mg by mouth Daily., Disp: , Rfl:   •  vitamin B-12 (CYANOCOBALAMIN) 1000 MCG tablet, Take 1 tablet by mouth Daily., Disp: 30 tablet, Rfl: 12    \"The following portions of the patient's history were reviewed and updated as appropriate: allergies, current medications, past family history, past medical history, past social history, past surgical history and problem list.\"  Review and Interpretation of Imaging:    Reviewed 12/17 IP admission imaging/diagnostics  Reviewed 01/29 Carotid US     Laboratory Results:             Lab Results   Component Value Date    HGBA1C 7.10 (H) 04/28/2018         Lab Results   Component Value Date    CHOL 152 12/08/2017         Lab Results   Component Value Date    HDL 50 12/08/2017    HDL 5 (L) 03/26/2014         Lab Results   Component Value Date    LDL 77 12/08/2017    LDL 26 03/26/2014         Lab Results   Component Value Date    TRIG 124 12/08/2017    TRIG 205 (H) 03/26/2014     No results found for: RPR  No results found for: TSH  No results found for: [id number]    Physical Examination: NIHSS:  mRS: 1  General Appearance:   Well developed, obese, well groomed, " alert, and cooperative.  HEENT: Normocephalic.    Neck and Spine: Normal range of motion.  Normal alignment. No mass or tenderness. No bruits.  Cardiac: Regular rate and rhythm. No murmurs.  Peripheral Vasculature: Radial and pedal pulses are equal and symmetric.   Extremities:    No edema or deformities. Normal joint ROM.  Skin:    No rashes or birth marks.    Neurological examination:  Higher Integrative  Function: Oriented to time, place and person. Normal registration, recall, attention span and concentration. Normal language including comprehension, spontaneous speech, repetition, reading, writing, naming and vocabulary. No neglect with normal visual-spatial function and construction. Normal fund of knowledge and higher integrative function.  CN II: Pupils are equal, round, and reactive to light. Normal visual acuity and visual fields.    CN III IV VI: Extraocular movements are full without nystagmus.   CN V: Normal facial sensation and strength of muscles of mastication.  CN VII: Facial movements are symmetric. No weakness.  CN VIII:   Auditory acuity is normal.  CN IX & X:   Symmetric palatal movement.  CN XI: Sternocleidomastoid and trapezius are normal.  No weakness.  CN XII:   The tongue is midline.  No atrophy or fasciculations.  Motor: Normal muscle strength, bulk and tone in upper and lower extremities.  No fasciculations, rigidity, spasticity, or abnormal movements.  Reflexes: Plantar responses are flexor.  Sensation: Normal to light touch in arms and legs. Normal graphesthesia and no extinction on DSS.  Station and Gait: Slowed; short stepped gait; uses walker to ulate   Coordination: Finger to nose test shows no dysmetria.  Rapid alternating movements are normal.      Diagnoses / Discussion:  Ms. Longoria is an 82yo w/atrial fibrillation, carotid stenosis, diabetes mellitus, hyperlipidemia, hypertension and CAD who I am seeing today in  Follow-up for stroke which caused aphasia. Imaging did not reveal  evidence of an acute stroke but imaging was limited d/t not being able to have MRI because of pacemaker. 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 This is associated with a high risk of embolization. Repeat US 01/29 showed normal LICA and d stenosis of SOHAM w/o significant stenosis. She needs continued maximal medical therapy with high dose statin and ASA. Plavix/Crestor preferred Continue warfarin for atrial fib and stroke prevention. She needs continued aggressive risk factor control, BP and BS. I will plan to refer to Vascular for second opinion. She remains asymptomatic and has not failed medical therapy. She agrees with the plan and will call with any questions or concerns. Other recommendations below.         Plan:   Recommend q 6 month Lipid panel, AST and ALT   Add B-12 replacement 1000 mcq daily (333) PMD to follow    OP Referral to Dr. Daniels; previously established patient for re-evaluation of ulcerated bulky plaque in LICA w/o significant stenosis previously documented per Dr. Orellana/ Janet Iqbal for Bilateral LE edema   Continue Coumadin, Crestor and ASA as written    Blood pressure control to <130/80    Goal LDL <70-recommend high dose statins-    Serum glucose < 140   Call 911 for stroke any stroke symptoms   Follow-up w/ me as needed   Caitlyn was seen today for cerebrovascular accident.    Diagnoses and all orders for this visit:    Internal carotid artery stenosis, right  -     Ambulatory Referral to Vascular Surgery    Low serum vitamin B12  -     vitamin B-12 (CYANOCOBALAMIN) 1000 MCG tablet; Take 1 tablet by mouth Daily.        MDM  Reviewed: previous chart, nursing note and vitals  Reviewed previous: labs and CT scan  Interpretation: labs  Consults: Vascular referral

## 2019-02-08 PROBLEM — E66.01 MORBIDLY OBESE: Status: ACTIVE | Noted: 2019-02-08

## 2019-02-11 ENCOUNTER — TELEPHONE (OUTPATIENT)
Dept: ORTHOPEDIC SURGERY | Facility: CLINIC | Age: 84
End: 2019-02-11

## 2019-02-12 DIAGNOSIS — M00.9 JOINT INFECTION (HCC): Primary | ICD-10-CM

## 2019-02-20 ENCOUNTER — LAB (OUTPATIENT)
Dept: LAB | Facility: HOSPITAL | Age: 84
End: 2019-02-20

## 2019-02-20 ENCOUNTER — ANTICOAGULATION VISIT (OUTPATIENT)
Dept: PHARMACY | Facility: HOSPITAL | Age: 84
End: 2019-02-20

## 2019-02-20 DIAGNOSIS — M00.9 JOINT INFECTION (HCC): ICD-10-CM

## 2019-02-20 DIAGNOSIS — I48.0 PAF (PAROXYSMAL ATRIAL FIBRILLATION) (HCC): ICD-10-CM

## 2019-02-20 LAB
CRP SERPL-MCNC: 0.09 MG/DL (ref 0–0.5)
ERYTHROCYTE [SEDIMENTATION RATE] IN BLOOD: 4 MM/HR (ref 0–30)
INR PPP: 3 (ref 0.91–1.09)
PROTHROMBIN TIME: 36.3 SECONDS (ref 10–13.8)

## 2019-02-20 PROCEDURE — 85652 RBC SED RATE AUTOMATED: CPT

## 2019-02-20 PROCEDURE — 36416 COLLJ CAPILLARY BLOOD SPEC: CPT

## 2019-02-20 PROCEDURE — 85610 PROTHROMBIN TIME: CPT

## 2019-02-20 PROCEDURE — 36415 COLL VENOUS BLD VENIPUNCTURE: CPT

## 2019-02-20 PROCEDURE — 86140 C-REACTIVE PROTEIN: CPT

## 2019-02-20 NOTE — PROGRESS NOTES
Anticoagulation Clinic Progress Note    Anticoagulation Summary  As of 2/20/2019    INR goal:   2.0-3.0   TTR:   82.5 % (5.1 mo)   INR used for dosing:   3.0 (2/20/2019)   Warfarin maintenance plan:   2 mg on Tue, Sat; 4 mg all other days   Weekly warfarin total:   24 mg   No change documented:   Christel Velazquez   Plan last modified:   Mike Jordan RPH (12/5/2018)   Next INR check:   3/19/2019   Priority:   Maintenance   Target end date:   Indefinite    Indications    PAF (paroxysmal atrial fibrillation) (CMS/[facility]) [I48.0]             Anticoagulation Episode Summary     INR check location:       Preferred lab:       Send INR reminders to:    [facility]    Comments:         Anticoagulation Care Providers     Provider Role Specialty Phone number    Rommel Veliz MD Referring Cardiology [phone]          Clinic Interview:  Patient Findings     Negatives:   Signs/symptoms of thrombosis, Signs/symptoms of bleeding,   Laboratory test error suspected, Change in health, Change in alcohol use,   Change in activity, Upcoming invasive procedure, Emergency department   visit, Upcoming dental procedure, Missed doses, Extra doses, Change in   medications, Change in diet/appetite, Hospital admission, Bruising, Other   complaints      Clinical Outcomes     Negatives:   Major bleeding event, Thromboembolic event,   Anticoagulation-related hospital admission, Anticoagulation-related ED   visit, Anticoagulation-related fatality        INR History:  Anticoagulation Monitoring 12/26/2018 1/23/2019 2/20/2019   INR 2.5 2.3 3.0   INR Date 12/26/2018 1/23/2019 2/20/2019   INR Goal 2.0-3.0 2.0-3.0 2.0-3.0   Trend Same Same Same   Last Week Total 24 mg 24 mg 24 mg   Next Week Total 24 mg 24 mg 24 mg   Sun 4 mg 4 mg 4 mg   Mon 4 mg 4 mg 4 mg   Tue 2 mg 2 mg 2 mg   Wed 4 mg 4 mg 4 mg   Thu 4 mg 4 mg 4 mg   Fri 4 mg 4 mg 4 mg   Sat 2 mg 2 mg 2 mg   Visit Report - - -   Some recent data might be hidden        Plan:  1. INR is therapeutic today- see above in Anticoagulation Summary.   Will instruct Caitlyn GARRETT Longoria to continue their warfarin regimen- see above in Anticoagulation Summary.  2. Follow up in 4 weeks.  3. Patient declines warfarin refills.  4. Verbal and written information provided. Patient expresses understanding and has no further questions at this time.    Christel Velazquez

## 2019-03-01 ENCOUNTER — OFFICE VISIT (OUTPATIENT)
Dept: INFECTIOUS DISEASES | Facility: CLINIC | Age: 84
End: 2019-03-01

## 2019-03-01 VITALS
RESPIRATION RATE: 12 BRPM | BODY MASS INDEX: 37.69 KG/M2 | WEIGHT: 226.2 LBS | HEIGHT: 65 IN | HEART RATE: 97 BPM | TEMPERATURE: 98.3 F | SYSTOLIC BLOOD PRESSURE: 117 MMHG | DIASTOLIC BLOOD PRESSURE: 79 MMHG

## 2019-03-01 DIAGNOSIS — Z79.2 LONG TERM (CURRENT) USE OF ANTIBIOTICS: ICD-10-CM

## 2019-03-01 DIAGNOSIS — T84.59XD INFECTION OF PROSTHETIC KNEE JOINT, SUBSEQUENT ENCOUNTER: Primary | ICD-10-CM

## 2019-03-01 DIAGNOSIS — Z96.659 INFECTION OF PROSTHETIC KNEE JOINT, SUBSEQUENT ENCOUNTER: Primary | ICD-10-CM

## 2019-03-01 PROCEDURE — 99213 OFFICE O/P EST LOW 20 MIN: CPT | Performed by: INTERNAL MEDICINE

## 2019-03-01 RX ORDER — CEFADROXIL 500 MG/1
500 CAPSULE ORAL EVERY 12 HOURS SCHEDULED
Qty: 60 CAPSULE | Refills: 11 | Status: SHIPPED | OUTPATIENT
Start: 2019-03-01 | End: 2019-03-01

## 2019-03-01 RX ORDER — CEFADROXIL 500 MG/1
500 CAPSULE ORAL EVERY 12 HOURS SCHEDULED
Qty: 180 CAPSULE | Refills: 3 | Status: SHIPPED | OUTPATIENT
Start: 2019-03-01 | End: 2019-05-30

## 2019-03-01 NOTE — PROGRESS NOTES
CC: f/u Prosthetic right knee infection - culture negative    HPI: Caitlyn Longoria is a 84 y.o. female here for f/u prosthetic right knee infection - culture negative. She remains on suppressive cefadroxil since she is not a surgical candidate (or at least a poor surgical candidate) per my prior d/w Dr Hunter. She is tolerating cefadroxil without rash or diarrhea. She denies knee pain. She walks with a walker.     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 Sulfa  2. Clarithromycin - headache    Medications:   Current Outpatient Medications:   •  acetaminophen (TYLENOL) 500 MG tablet, Take 500 mg by mouth Every 6 (Six) Hours As Needed for Mild Pain ., Disp: , Rfl:   •  Acetylcysteine (N-ACETYL-L-CYSTEINE) 600 MG capsule, Take  by mouth., Disp: , Rfl:   •  aspirin 325 MG tablet, Take 325 mg by mouth Daily., Disp: , Rfl:   •  ASPIRIN 81 PO, Take 81 mg by mouth Daily., Disp: , Rfl:   •  azelastine (ASTEPRO) 0.15 % solution nasal spray, 2 sprays into each nostril 2 (Two) Times a Day., Disp: , Rfl:   •  azelastine (OPTIVAR) 0.05 % ophthalmic solution, Administer 1 drop to both eyes 2 (Two) Times a Day., Disp: , Rfl:   •  Biotin 2.5 MG capsule, Take 1 capsule by mouth Daily., Disp: , Rfl:   •  Calcium Carbonate-Vitamin D (CALCIUM-VITAMIN D) 500-200 MG-UNIT tablet per tablet, TAKE TWO TABLETS BY MOUTH DAILY, Disp: , Rfl:   •  carvedilol (COREG) 3.125 MG tablet, Take 1 tablet by mouth 2 (Two) Times a Day., Disp: 60 tablet, Rfl: 11  •  cefadroxil (DURICEF) 500 MG capsule, Take 500 mg by mouth Every 12 (Twelve) Hours., Disp: , Rfl:   •  cetirizine (ZyrTEC) 10 MG tablet, Take 10 mg by mouth Daily., Disp: , Rfl:   •   cholecalciferol (VITAMIN D3) 1000 units tablet, Take 1,000 Units by mouth Daily., Disp: , Rfl:   •  clobetasol (TEMOVATE) 0.05 % external solution, , Disp: , Rfl:   •  clotrimazole-betamethasone (LOTRISONE) 1-0.05 % cream, Apply 1 application topically 2 (Two) Times a Day As Needed., Disp: , Rfl:   •  dry mouth gel (BIOTENE ORALBALANCE) gel, Apply  to the mouth or throat As Needed for Dry Mouth., Disp: , Rfl:   •  flunisolide (NASALIDE) 25 MCG/ACT (0.025%) solution nasal spray, Inhale 2 sprays Every 12 (Twelve) Hours., Disp: , Rfl:   •  furosemide (LASIX) 20 MG tablet, Take 20 mg by mouth 2 (Two) Times a Day., Disp: , Rfl:   •  glipiZIDE (GLUCOTROL) 5 MG tablet, Take one half tablet by  Mouth every morning, Disp: , Rfl:   •  ketoconazole (NIZORAL) 2 % shampoo, Apply  topically 2 (Two) Times a Week., Disp: , Rfl:   •  loperamide (IMODIUM) 2 MG capsule, Take 2 mg by mouth Every Other Day., Disp: , Rfl:   •  losartan (COZAAR) 25 MG tablet, TAKE ONE TABLET BY MOUTH DAILY, Disp: 90 tablet, Rfl: 3  •  melatonin 1 MG tablet, Take 3 mg by mouth Daily., Disp: , Rfl:   •  Omega-3 Fatty Acids (FISH OIL) 1000 MG capsule capsule, Take 1,000 mg by mouth 2 (Two) Times a Day With Meals., Disp: , Rfl:   •  oxazepam (SERAX) 10 MG capsule, Take 1 capsule by mouth Every Night., Disp: 5 capsule, Rfl: 0  •  oxybutynin XL (DITROPAN-XL) 10 MG 24 hr tablet, Take 10 mg by mouth Daily., Disp: , Rfl:   •  pantoprazole (PROTONIX) 40 MG EC tablet, Take 40 mg by mouth., Disp: , Rfl:   •  phenazopyridine (PYRIDIUM) 200 MG tablet, , Disp: , Rfl:   •  potassium chloride (K-DUR,KLOR-CON) 20 MEQ CR tablet, Take  by mouth., Disp: , Rfl:   •  rosuvastatin (CRESTOR) 20 MG tablet, Take 20 mg by mouth Every Night., Disp: , Rfl:   •  vitamin B-12 (CYANOCOBALAMIN) 1000 MCG tablet, Take 1 tablet by mouth Daily., Disp: 30 tablet, Rfl: 12  •  warfarin (COUMADIN) 4 MG tablet, TAKE ONE TABLET BY MOUTH DAILY, Disp: 90 tablet, Rfl: 0    OBJECTIVE:  /79    "Pulse 97   Temp 98.3 °F (36.8 °C)   Resp 12   Ht 165.1 cm (65\")   Wt 103 kg (226 lb 3.2 oz)   BMI 37.64 kg/m²     General: awake, alert, NAD  Head: Normocephalic  Eyes: no scleral icterus  ENT: MMM  Cardiovascular: NR, 1+ LE edema  Respiratory: normal work of breathing on ambient air  GI: Abdomen is obese  Musculoskeletal: R knee incision is healed; knee is neither hot nor erythematous  Skin: No rashes  Neurological: Alert and oriented x 3  Psychiatric: Normal mood and affect     DIAGNOSTICS:  INR, ESR, and CRP reviewed today  Lab Results   Component Value Date    WBC 8.59 08/27/2018    HGB 11.6 (L) 08/27/2018    HCT 37.5 08/27/2018    MCV 93.5 08/27/2018     08/27/2018     Lab Results   Component Value Date    GLUCOSE 102 (H) 08/27/2018    CALCIUM 8.6 08/27/2018     08/27/2018    K 4.3 08/27/2018    CO2 27.4 08/27/2018     08/27/2018    BUN 17 08/27/2018    CREATININE 1.00 08/27/2018    EGFRIFNONA 53 (L) 08/27/2018    BCR 17.0 08/27/2018    ANIONGAP 10.6 08/27/2018     Lab Results   Component Value Date    CRP 0.09 02/20/2019     Lab Results   Component Value Date    SEDRATE 4 02/20/2019     Lab Results   Component Value Date    INR 3.0 (H) 02/20/2019    INR 2.3 (H) 01/23/2019    INR 2.5 (H) 12/26/2018    PROTIME 36.3 (H) 02/20/2019    PROTIME 27.7 (H) 01/23/2019    PROTIME 29.6 (H) 12/26/2018       Microbiology:  1/14/18 BCx: negative  1/15/18 Synovial Fluid R Knee: Negative at 14 days    ASSESSMENT/PLAN:  1. 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 Long term use of antibiotics  -labs as above    3. Paroxysmal Atrial fibrillation  -on coumadin; INR followed by anti-coagulation clinic  -will not be affected by cefadroxil    RTC 12 months    I spent greater than 15 minutes of face-to-face time with patient and >50% counseling re: role of suppressive antibiotics, lab monitoring, and signs/symptoms of recurrent infection.

## 2019-03-07 ENCOUNTER — OFFICE VISIT (OUTPATIENT)
Dept: ORTHOPEDIC SURGERY | Facility: CLINIC | Age: 84
End: 2019-03-07

## 2019-03-07 ENCOUNTER — TELEPHONE (OUTPATIENT)
Dept: ORTHOPEDIC SURGERY | Facility: CLINIC | Age: 84
End: 2019-03-07

## 2019-03-07 VITALS — WEIGHT: 224 LBS | TEMPERATURE: 97.6 F | BODY MASS INDEX: 37.32 KG/M2 | HEIGHT: 65 IN

## 2019-03-07 DIAGNOSIS — Z96.651 HISTORY OF TOTAL KNEE ARTHROPLASTY, RIGHT: Primary | ICD-10-CM

## 2019-03-07 PROCEDURE — 73562 X-RAY EXAM OF KNEE 3: CPT | Performed by: ORTHOPAEDIC SURGERY

## 2019-03-07 PROCEDURE — 99213 OFFICE O/P EST LOW 20 MIN: CPT | Performed by: ORTHOPAEDIC SURGERY

## 2019-03-07 NOTE — PROGRESS NOTES
Patient: Caitlyn Longoria  YOB: 1934 84 y.o. female  Medical Record Number: [id number]    Chief Complaints:   Chief Complaint   Patient presents with   • Right Knee - Follow-up       History of Present Illness:Caitlyn Longoria is a 84 y.o. female who presents for follow-up of  Right knee replacement - h/o infection feels great now without complaints - on Keflex per Dr Alfonso    Allergies:   Allergies   Allergen Reactions   • Accupril [Quinapril Hcl] Delirium     Swelling, HA,, confusion and constipation per pt   • Ahist [Chlorpheniramine] Nausea Only, Other (See Comments) and Dizziness     Headache, Blurred vision   • Chlorcyclizine Unknown (See Comments)   • Clarithromycin Nausea Only     Other reaction(s): Headache, Depression, Flushed   • Diclofenac Sodium Unknown (See Comments)   • Diphenhydramine      Benadryl   • Esomeprazole GI Intolerance     Nexium. Stomach issues   • Ibuprofen Other (See Comments)     Does not recall    • Levalbuterol Swelling     Xopenex   • Levocetirizine Other (See Comments)     Xyzal. Diarrhea, Stomach cramps   • Lipitor [Atorvastatin] Other (See Comments)     Muscle pain and weakness, dark urine   • Lodine [Etodolac]    • Omeprazole Nausea Only     Prilosec. Headache   • Pravachol [Pravastatin] Nausea Only and Other (See Comments)     Bloated, Constipation, Headaches   • Quinapril Swelling and Confusion     Accupril. Headaches, constipation   • Sulfa Antibiotics    • Sulindac Myalgia     Other reaction(s): Headache, joint pain, bruising   • Valdecoxib Irritability   • Prednisone Rash       Medications:   Current Outpatient Medications   Medication Sig Dispense Refill   • acetaminophen (TYLENOL) 500 MG tablet Take 500 mg by mouth Every 6 (Six) Hours As Needed for Mild Pain .     • Acetylcysteine (N-ACETYL-L-CYSTEINE) 600 MG capsule Take  by mouth.     • ASPIRIN 81 PO Take 81 mg by mouth Daily.     • azelastine (ASTEPRO) 0.15 % solution nasal spray 2 sprays into  each nostril 2 (Two) Times a Day.     • azelastine (OPTIVAR) 0.05 % ophthalmic solution Administer 1 drop to both eyes 2 (Two) Times a Day.     • Biotin 2.5 MG capsule Take 1 capsule by mouth Daily.     • Calcium Carbonate-Vitamin D (CALCIUM-VITAMIN D) 500-200 MG-UNIT tablet per tablet TAKE TWO TABLETS BY MOUTH DAILY     • carvedilol (COREG) 3.125 MG tablet Take 1 tablet by mouth 2 (Two) Times a Day. 60 tablet 11   • cefadroxil (DURICEF) 500 MG capsule Take 1 capsule by mouth Every 12 (Twelve) Hours for 90 days. 180 capsule 3   • cetirizine (ZyrTEC) 10 MG tablet Take 10 mg by mouth Daily.     • clobetasol (TEMOVATE) 0.05 % external solution      • clotrimazole-betamethasone (LOTRISONE) 1-0.05 % cream Apply 1 application topically 2 (Two) Times a Day As Needed.     • dry mouth gel (BIOTENE ORALBALANCE) gel Apply  to the mouth or throat As Needed for Dry Mouth.     • flunisolide (NASALIDE) 25 MCG/ACT (0.025%) solution nasal spray Inhale 2 sprays Every 12 (Twelve) Hours.     • furosemide (LASIX) 20 MG tablet Take 20 mg by mouth 2 (Two) Times a Day.     • glipiZIDE (GLUCOTROL) 5 MG tablet Take one half tablet by  Mouth every morning     • ketoconazole (NIZORAL) 2 % shampoo Apply  topically 2 (Two) Times a Week.     • loperamide (IMODIUM) 2 MG capsule Take 2 mg by mouth Every Other Day.     • losartan (COZAAR) 25 MG tablet TAKE ONE TABLET BY MOUTH DAILY 90 tablet 3   • Omega-3 Fatty Acids (FISH OIL) 1000 MG capsule capsule Take 1,000 mg by mouth 2 (Two) Times a Day With Meals.     • oxazepam (SERAX) 10 MG capsule Take 1 capsule by mouth Every Night. 5 capsule 0   • oxybutynin XL (DITROPAN-XL) 10 MG 24 hr tablet Take 10 mg by mouth Daily.     • rosuvastatin (CRESTOR) 20 MG tablet Take 20 mg by mouth Every Night.     • vitamin B-12 (CYANOCOBALAMIN) 1000 MCG tablet Take 1 tablet by mouth Daily. 30 tablet 12   • warfarin (COUMADIN) 4 MG tablet TAKE ONE TABLET BY MOUTH DAILY 90 tablet 0   • cholecalciferol (VITAMIN D3) 1000  "units tablet Take 1,000 Units by mouth Daily.       No current facility-administered medications for this visit.          The following portions of the patient's history were reviewed and updated as appropriate: allergies, current medications, past family history, past medical history, past social history, past surgical history and problem list.    Review of Systems:   A 14 point review of systems was performed. All systems negative except pertinent positives/negative listed in HPI above    Physical Exam:   Vitals:    03/07/19 1142   Temp: 97.6 °F (36.4 °C)   Weight: 102 kg (224 lb)   Height: 165.1 cm (65\")       General: A and O x 3, ASA, NAD    SCLERA:    Normal    DENTITION:   Normal  Knee:  right    ALIGNMENT:     Neutral  ,   Patella  tracks  Midline without crepitance    GAIT:    Nonantalgic    SKIN:    Well healed midline incision, no erythema or fluctuance    RANGE OF MOTION:   0  -  120   DEG    STRENGTH:   5  / 5    LIGAMENTS:    No varus / valgus instability.   No  Flexion   instability.       DISTAL PULSES:    Paplable    DISTAL SENSATION :   Intact    LYMPHATICS:     No   lymphadenopathy    OTHER:     No   Effusion      Calf soft / nontender ,   Negative Kimi's sign            Radiology:  Xrays 3viewsright knee  (ap,lateral, sunrise) were ordered and reviewed for evaluation of knee pain demonstratinga well positioned knee replacement without evidence of wear, loosening or osteolysis  todays xrays were compared to previous xrays and demonstrate no change    Assessment/Plan:  Right knee chronic infection supressed on keflex. Labs look great   - continue current treatment.  RTO 1 year.   "

## 2019-03-07 NOTE — TELEPHONE ENCOUNTER
Patient has history of right TKA 3/25/03 with multiple post op infections. She forgot to ask RBB today if she still should take antibiotics prior to dental appointments?

## 2019-03-08 RX ORDER — AMOXICILLIN 500 MG/1
CAPSULE ORAL
Qty: 4 CAPSULE | Refills: 0 | Status: SHIPPED | OUTPATIENT
Start: 2019-03-08 | End: 2019-09-20 | Stop reason: HOSPADM

## 2019-03-15 ENCOUNTER — TELEPHONE (OUTPATIENT)
Dept: INFECTIOUS DISEASES | Facility: CLINIC | Age: 84
End: 2019-03-15

## 2019-03-15 NOTE — TELEPHONE ENCOUNTER
Phone with Mrs. Longoria. States she has been on Cefadroxil 500mg BID for quite some time and has done well. For the past 3-4 days she has started having uncontrollable diarrhea and can't make it to the bathroom. She wonders if this is related to the ABX? She says it happens about once per day, no fever and no other symptoms. Please advise. Renetta Viera RN

## 2019-03-15 NOTE — TELEPHONE ENCOUNTER
Phone with patient. Informed her to try the Imodium, increase her fluids and fiber and call us back the first of next week if her symptoms persist. Call over the weekend if she develops any abdominal pain or fever. Patient voiced understanding and will call if symptoms worsen or persist. Renetta Viera RN

## 2019-03-15 NOTE — TELEPHONE ENCOUNTER
I suspect it has another cause with it happening so late and only once per day. She should try taking some fiber and can take anti-motility agents such as Imodium to see if that helps.     If she begins to have more than 3 watery stools per day then she should give us another call but I suspect  this will improve soon.     Thanks,  NB

## 2019-03-19 ENCOUNTER — ANTICOAGULATION VISIT (OUTPATIENT)
Dept: PHARMACY | Facility: HOSPITAL | Age: 84
End: 2019-03-19

## 2019-03-19 DIAGNOSIS — I48.0 PAF (PAROXYSMAL ATRIAL FIBRILLATION) (HCC): ICD-10-CM

## 2019-03-19 LAB
INR PPP: 3 (ref 0.91–1.09)
PROTHROMBIN TIME: 36.3 SECONDS (ref 10–13.8)

## 2019-03-19 PROCEDURE — 36416 COLLJ CAPILLARY BLOOD SPEC: CPT

## 2019-03-19 PROCEDURE — 85610 PROTHROMBIN TIME: CPT

## 2019-03-19 NOTE — PROGRESS NOTES
Anticoagulation Clinic Progress Note    Anticoagulation Summary  As of 3/19/2019    INR goal:   2.0-3.0   TTR:   85.1 % (6 mo)   INR used for dosing:   3.0 (3/19/2019)   Warfarin maintenance plan:   2 mg on Tue, Sat; 4 mg all other days   Weekly warfarin total:   24 mg   No change documented:   Yumiko Avilez   Plan last modified:   [facility] (12/5/2018)   Next INR check:   4/17/2019   Priority:   Maintenance   Target end date:   Indefinite    Indications    PAF (paroxysmal atrial fibrillation) (CMS/[facility]) [I48.0]             Anticoagulation Episode Summary     INR check location:       Preferred lab:       Send INR reminders to:    [facility]    Comments:         Anticoagulation Care Providers     Provider Role Specialty Phone number    Rommel Veliz MD Referring Cardiology [phone]          Clinic Interview:  Patient Findings     Negatives:   Signs/symptoms of thrombosis, Signs/symptoms of bleeding,   Laboratory test error suspected, Change in health, Change in alcohol use,   Change in activity, Upcoming invasive procedure, Emergency department   visit, Upcoming dental procedure, Missed doses, Extra doses, Change in   medications, Change in diet/appetite, Hospital admission, Bruising, Other   complaints      Clinical Outcomes     Negatives:   Major bleeding event, Thromboembolic event,   Anticoagulation-related hospital admission, Anticoagulation-related ED   visit, Anticoagulation-related fatality        INR History:  Anticoagulation Monitoring 1/23/2019 2/20/2019 3/19/2019   INR 2.3 3.0 3.0   INR Date 1/23/2019 2/20/2019 3/19/2019   INR Goal 2.0-3.0 2.0-3.0 2.0-3.0   Trend Same Same Same   Last Week Total 24 mg 24 mg 24 mg   Next Week Total 24 mg 24 mg 24 mg   Sun 4 mg 4 mg 4 mg   Mon 4 mg 4 mg 4 mg   Tue 2 mg 2 mg 2 mg   Wed 4 mg 4 mg 4 mg   Thu 4 mg 4 mg 4 mg   Fri 4 mg 4 mg 4 mg   Sat 2 mg 2 mg 2 mg   Visit Report - - -   Some recent data might be hidden        Plan:  1. INR is therapeutic today- see above in Anticoagulation Summary.   Will instruct Caitlyn TUCKER Rasabina to continue their warfarin regimen- see above in Anticoagulation Summary.  2. Follow up in 4 weeks.  3. Patient declines warfarin refills.  4. Verbal and written information provided. Patient expresses understanding and has no further questions at this time.    Yumiko Avilez

## 2019-04-17 ENCOUNTER — OFFICE VISIT (OUTPATIENT)
Dept: CARDIOLOGY | Facility: CLINIC | Age: 84
End: 2019-04-17

## 2019-04-17 ENCOUNTER — CLINICAL SUPPORT NO REQUIREMENTS (OUTPATIENT)
Dept: CARDIOLOGY | Facility: CLINIC | Age: 84
End: 2019-04-17

## 2019-04-17 ENCOUNTER — ANTICOAGULATION VISIT (OUTPATIENT)
Dept: PHARMACY | Facility: HOSPITAL | Age: 84
End: 2019-04-17

## 2019-04-17 VITALS
BODY MASS INDEX: 37.82 KG/M2 | WEIGHT: 227 LBS | HEART RATE: 90 BPM | HEIGHT: 65 IN | DIASTOLIC BLOOD PRESSURE: 60 MMHG | SYSTOLIC BLOOD PRESSURE: 80 MMHG

## 2019-04-17 DIAGNOSIS — I63.032 CEREBROVASCULAR ACCIDENT (CVA) DUE TO THROMBOSIS OF LEFT CAROTID ARTERY (HCC): ICD-10-CM

## 2019-04-17 DIAGNOSIS — I48.0 PAROXYSMAL ATRIAL FIBRILLATION (HCC): Primary | ICD-10-CM

## 2019-04-17 DIAGNOSIS — I25.5 CARDIOMYOPATHY, ISCHEMIC: ICD-10-CM

## 2019-04-17 DIAGNOSIS — I25.10 CORONARY ARTERY DISEASE INVOLVING NATIVE CORONARY ARTERY OF NATIVE HEART WITHOUT ANGINA PECTORIS: ICD-10-CM

## 2019-04-17 DIAGNOSIS — I49.5 SICK SINUS SYNDROME (HCC): ICD-10-CM

## 2019-04-17 DIAGNOSIS — I65.23 CAROTID ARTERY PLAQUE, BILATERAL: ICD-10-CM

## 2019-04-17 DIAGNOSIS — I48.92 ATRIAL FIBRILLATION AND FLUTTER (HCC): ICD-10-CM

## 2019-04-17 DIAGNOSIS — I48.91 ATRIAL FIBRILLATION AND FLUTTER (HCC): ICD-10-CM

## 2019-04-17 DIAGNOSIS — I44.2 THIRD DEGREE AV BLOCK (HCC): ICD-10-CM

## 2019-04-17 DIAGNOSIS — I48.0 PAF (PAROXYSMAL ATRIAL FIBRILLATION) (HCC): ICD-10-CM

## 2019-04-17 DIAGNOSIS — I49.5 SICK SINUS SYNDROME (HCC): Primary | ICD-10-CM

## 2019-04-17 DIAGNOSIS — E11.59 TYPE 2 DIABETES MELLITUS WITH OTHER CIRCULATORY COMPLICATION, WITHOUT LONG-TERM CURRENT USE OF INSULIN (HCC): ICD-10-CM

## 2019-04-17 LAB
INR PPP: 2.8 (ref 0.91–1.09)
PROTHROMBIN TIME: 33.7 SECONDS (ref 10–13.8)

## 2019-04-17 PROCEDURE — 93280 PM DEVICE PROGR EVAL DUAL: CPT | Performed by: INTERNAL MEDICINE

## 2019-04-17 PROCEDURE — 85610 PROTHROMBIN TIME: CPT

## 2019-04-17 PROCEDURE — 36416 COLLJ CAPILLARY BLOOD SPEC: CPT

## 2019-04-17 PROCEDURE — 99213 OFFICE O/P EST LOW 20 MIN: CPT | Performed by: NURSE PRACTITIONER

## 2019-04-17 RX ORDER — CARVEDILOL 3.12 MG/1
3.12 TABLET ORAL 2 TIMES DAILY
Qty: 180 TABLET | Refills: 3 | Status: SHIPPED | OUTPATIENT
Start: 2019-04-17 | End: 2020-05-04

## 2019-04-17 RX ORDER — FLUTICASONE PROPIONATE 50 MCG
1 SPRAY, SUSPENSION (ML) NASAL DAILY
COMMUNITY

## 2019-04-17 NOTE — PROGRESS NOTES
Date of Office Visit: 2019  Encounter Provider: KRISTIN Frey  Place of Service: [facility] CARDIOLOGY  Patient Name: Caitlyn Longoria  :1934    Chief Complaint   Patient presents with   • Coronary Artery Disease   • Atrial Fibrillation   • Follow-up   • Pacemaker Check   :     HPI: Caitlyn Longoria is a 84 y.o. female is a patient of Dr. Veliz. I am seeing her today and have reviewed her record.      Her past medical history is significant of hypertension, hyperlipidemia, atrial fibrillation, coronary artery disease with history of stent, CVA, chronic systolic and diastolic congestive heart failure, chronic kidney disease stage III, and diabetes mellitus.     In 2007 it appeared that she had an anterior infarct.  She had an echocardiogram that confirmed this and ultimately had cardiac catheterization on 2008, which confirmed a left ventricular ejection fraction of 35%, total occlusion of the mid left anterior descending, severe disease of the large diagonal, and insignificant disease of the right coronary artery and circumflex.  She had angioplasty and drug-eluting stent placement of the diagonal.  Unfortunately, she developed a right femoral pseudoaneurysm and had to have that surgically repaired.  Follow-up echocardiogram in  showed left ventricular function have returned to normal.     In 2011 she presented with increasing shortness of breath and was found to have some congestive heart failure, atrial flutter, and marked bradycardia.  Echocardiogram at that time showed preserved LV function.  He had a dual-chamber pacemaker implanted.  She underwent atrial flutter ablation in 2011.     In 2012 she presented with dyspnea area she underwent cardiac catheterization which showed elevated right-sided pressures with a PA systolic pressure of 53mmHg, mean 31, and elevated wedge at 26.  She hadin-stent stenosis of the diagonal  vessel.  The total occlusion of the mid LAD was unchanged.  The EF was approximately 50% and she underwent angioplasty and drug-eluting stent to the diagonal vessel, again.  Her medications were adjusted and then she developed dizziness so her Lasix was cut back.  She had a couple episodes of passing out that were attributed to low blood pressure     She then had the RV lead revised in October 2014.     In November 2016 she had some shortness of breath.  She had a perfusion stress test that showed a very small area of ischemia in the septal wall and an echocardiogram that revealed a left ventricular ejection fraction of approximately 45%significant valvular disease.  Turned out, she had reflux with aspiration and needed treatment for that.    In October 2017 she had neuro changes.  CT of the head and neck show no evidence of acute infarction or hemorrhage.  She was noted to have small vessel ischemic disease and age appropriate atrophic she has significant dysarthria which resolved today prior to discharge and was started on Plavix.  She then had a conjunctival hemorrhage and was told to take aspirin 81 mg a remain on Coumadin instead.  Atherosclerotic disease involving the carotid bifurcation was noted bilaterally.  Echocardiogram in December 2017 showedseverely decreased left ventricular systolic function with calculated EF of 23.4%, grade 1 diastolic dysfunction, mild LVH, moderately thickened aortic valve, moderate MAC, and mild mitral valve regurgitation.  She was unable to have a MRI due to her pacer.  In January 2018, she had sepsis of the right knee joint. ID recommended 6 weeks of IV antibiotics and she was discharged with a PICC line.   She then developed lymphedema.    Repeat echo in 05/2018,  showed left ventricular ejection fraction had improved from 23 to 35% - 40%.    She later moved into an apartment community.  In August 2018 she had been taken off Bumex and put on Lasix due to increased kidney  dysfunction.  Who is being followed by a chiropractor due to nerve and neck pain.    Patient presents today for six-month follow-up.  She reports doing very well despite a cough which she has had for a year.  She is due to see her pulmonologist next month and have further testing.  She will also see her gastroenterologist in May as this may be felt to be related to GERD.  She denies orthopnea, PND, edema, increased shortness of breath or increased lower extremity edema.  She denies chest pain tightness pressure, palpitation, dizziness, lightheadedness, fatigue, near-syncope, or syncope.  She follows her weights daily at home which have been 223-226.  She also follows her blood pressure which is normally 110-120 but occasionally 99 systolic.  She walks daily uses a walker.  She has been eating better and making healthier choices.  She continues to take antibiotic daily for history of septic knee and recurrent infection.      Allergies   Allergen Reactions   • Accupril [Quinapril Hcl] Delirium     Swelling, HA,, confusion and constipation per pt   • Ahist [Chlorpheniramine] Nausea Only, Other (See Comments) and Dizziness     Headache, Blurred vision   • Chlorcyclizine Unknown (See Comments)   • Clarithromycin Nausea Only     Other reaction(s): Headache, Depression, Flushed   • Diclofenac Sodium Unknown (See Comments)   • Diphenhydramine      Benadryl   • Esomeprazole GI Intolerance     Nexium. Stomach issues   • Ibuprofen Other (See Comments)     Does not recall    • Levalbuterol Swelling     Xopenex   • Levocetirizine Other (See Comments)     Xyzal. Diarrhea, Stomach cramps   • Lipitor [Atorvastatin] Other (See Comments)     Muscle pain and weakness, dark urine   • Lodine [Etodolac]    • Omeprazole Nausea Only     Prilosec. Headache   • Pravachol [Pravastatin] Nausea Only and Other (See Comments)     Bloated, Constipation, Headaches   • Quinapril Swelling and Confusion     Accupril. Headaches, constipation   • Sulfa  "Antibiotics    • Sulindac Myalgia     Other reaction(s): Headache, joint pain, bruising   • Valdecoxib Irritability   • Prednisone Rash       Past Medical History:   Diagnosis Date   • Aortic calcification (CMS/HCC)     mild, on echo 12/17/2017   • Aortic regurgitation     Trace   • CAD (coronary artery disease)    • Chronic combined systolic and diastolic congestive heart failure (CMS/HCC)    • CKD (chronic kidney disease) stage 3, GFR 30-59 ml/min (CMS/[facility])    • Coronary artery disease involving native coronary artery of native heart with angina pectoris (CMS/HCC)    • DM type 2 (diabetes mellitus, type 2) (CMS/HCC)    • Hypertension    • Mild mitral regurgitation    • Mitral annular calcification     12/8/2017- echo, moderate   • PAF (paroxysmal atrial fibrillation) (CMS/[facility])    • SSS (sick sinus syndrome) (CMS/[facility])    • Tricuspid regurgitation     Trace       Past Surgical History:   Procedure Laterality Date   • CARDIAC CATHETERIZATION     • CHOLECYSTECTOMY     • CORONARY STENT PLACEMENT     • HERNIA REPAIR     • HYSTERECTOMY     • PACEMAKER IMPLANTATION     • REPLACEMENT TOTAL KNEE           Family and social history reviewed.     Review of Systems   Cardiovascular: Positive for dyspnea on exertion. Negative for orthopnea.   Respiratory: Positive for cough and shortness of breath.    Neurological: Positive for loss of balance.     All other systems were reviewed and are negative          Objective:     Vitals:    04/17/19 0956   BP: (!) 80/60   BP Location: Left arm   Patient Position: Sitting   Pulse: 90   Weight: 121 kg (267 lb)   Height: 165.1 cm (65\")     Body mass index is 44.43 kg/m².    PHYSICAL EXAM:  Physical Exam   Constitutional: She is oriented to person, place, and time. She appears well-developed and well-nourished. No distress.   HENT:   Head: Normocephalic.   Eyes: Conjunctivae are normal.   Neck: Normal range of motion. No JVD present.   Cardiovascular: Normal rate, regular rhythm and intact " distal pulses.   Murmur heard.   Systolic murmur is present with a grade of 2/6 at the upper right sternal border.  Pulses:       Carotid pulses are 2+ on the right side, and 2+ on the left side.       Radial pulses are 2+ on the right side, and 2+ on the left side.        Posterior tibial pulses are 2+ on the right side, and 2+ on the left side.   Pulmonary/Chest: Effort normal and breath sounds normal. No respiratory distress. She has no wheezes. She has no rhonchi. She has no rales. She exhibits no tenderness.   Abdominal: Soft. Bowel sounds are normal. She exhibits no distension.   Musculoskeletal: Normal range of motion. She exhibits edema (trace ankle non pitting).   Neurological: She is alert and oriented to person, place, and time.   Skin: Skin is warm, dry and intact. No rash noted. She is not diaphoretic. No cyanosis.   Psychiatric: She has a normal mood and affect. Her behavior is normal. Judgment and thought content normal.       Procedures - no ECG due to device interrogation today    Current Outpatient Medications   Medication Sig Dispense Refill   • acetaminophen (TYLENOL) 500 MG tablet Take 500 mg by mouth Every 6 (Six) Hours As Needed for Mild Pain .     • ACETYLCYSTEINE PO Take 1,000 mg by mouth Daily.     • Albuterol (VENTOLIN IN) Inhale 3 (Three) Times a Day.     • amoxicillin (AMOXIL) 500 MG capsule Take 4 capsule one hour prior to dental procedure 4 capsule 0   • ASPIRIN 81 PO Take 81 mg by mouth Daily.     • azelastine (OPTIVAR) 0.05 % ophthalmic solution Administer 1 drop to both eyes 2 (Two) Times a Day.     • Calcium Carbonate-Vitamin D (CALCIUM-VITAMIN D) 500-200 MG-UNIT tablet per tablet TAKE TWO TABLETS BY MOUTH DAILY     • carvedilol (COREG) 3.125 MG tablet Take 1 tablet by mouth 2 (Two) Times a Day. 180 tablet 3   • cefadroxil (DURICEF) 500 MG capsule Take 1 capsule by mouth Every 12 (Twelve) Hours for 90 days. 180 capsule 3   • cetirizine (ZyrTEC) 10 MG tablet Take 10 mg by mouth  Daily.     • clobetasol (TEMOVATE) 0.05 % external solution      • clotrimazole-betamethasone (LOTRISONE) 1-0.05 % cream Apply 1 application topically 2 (Two) Times a Day As Needed.     • dry mouth gel (BIOTENE ORALBALANCE) gel Apply  to the mouth or throat As Needed for Dry Mouth.     • fluticasone (FLONASE) 50 MCG/ACT nasal spray 2 sprays into the nostril(s) as directed by provider Daily.     • furosemide (LASIX) 20 MG tablet Take 20 mg by mouth 2 (Two) Times a Day.     • glipiZIDE (GLUCOTROL) 5 MG tablet Take one half tablet by  Mouth every morning     • ketoconazole (NIZORAL) 2 % shampoo Apply  topically 2 (Two) Times a Week.     • loperamide (IMODIUM) 2 MG capsule Take 2 mg by mouth Every Other Day.     • losartan (COZAAR) 25 MG tablet TAKE ONE TABLET BY MOUTH DAILY 90 tablet 3   • Omega-3 Fatty Acids (FISH OIL) 1000 MG capsule capsule Take 1,000 mg by mouth 2 (Two) Times a Day With Meals.     • oxazepam (SERAX) 10 MG capsule Take 1 capsule by mouth Every Night. 5 capsule 0   • oxybutynin XL (DITROPAN-XL) 10 MG 24 hr tablet Take 10 mg by mouth Daily.     • rosuvastatin (CRESTOR) 20 MG tablet Take 20 mg by mouth Every Night.     • vitamin B-12 (CYANOCOBALAMIN) 1000 MCG tablet Take 1 tablet by mouth Daily. 30 tablet 12   • warfarin (COUMADIN) 4 MG tablet TAKE ONE TABLET BY MOUTH DAILY 90 tablet 0     No current facility-administered medications for this visit.      Assessment:       Diagnosis Plan   1. Paroxysmal atrial fibrillation (CMS/HCC)     2. Atrial fibrillation and flutter (CMS/HCC)  carvedilol (COREG) 3.125 MG tablet   3. Coronary artery disease involving native coronary artery of native heart without angina pectoris     4. PAF (paroxysmal atrial fibrillation) (CMS/HCC)     5. Cardiomyopathy, ischemic     6. Type 2 diabetes mellitus with other circulatory complication, without long-term current use of insulin (CMS/HCC)     7. Sick sinus syndrome (CMS/HCC)     8. 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 Third degree AV block (CMS/HCC)     10. Carotid artery plaque, bilateral          No orders of the defined types were placed in this encounter.        Plan:     1.  History of coronary disease involving the mid left anterior descending with total occlusion of that.  First diagonal vessel with severe disease status post angioplasty and drug-eluting stent placement.  Follow-up In October 2012 showed restenosis of the diagonal vessel and repeat angioplasty and drug-eluting stent placement was completed.  EF was approximately 50% with segmental wall motion abnormality.  Echocardiogram in December 2017 was 23.4% she was started on carvedilol and losartan.  Echocardiogram May 2018 showed improvement to 35-40%. No angina, no heart failure.  Coronary Artery Disease  Assessment  • The patient has no angina  • On warfarin    Plan  • Lifestyle modifications discussed include adhering to a heart healthy diet, avoidance of tobacco products, maintenance of a healthy weight, medication compliance, regular exercise and regular monitoring of cholesterol and blood pressure    Subjective - Objective  • There is a history of past MI  • There has been a previous stent procedure using LOLA  • Current antiplatelet therapy includes aspirin 81 mg          2.  Atrial fibrillation/sick sinus syndrome status post permanent pacemaker implantation status post flutter ablation status post RV lead revision.  No bleeding issues on warfarin following in our anticoagulation clinic  Atrial Fibrillation and Atrial Flutter  Assessment  • The patient has persistent atrial fibrillation  • This is non-valvular in etiology  • The patient's CHADS2-VASc score is 9  • A [id number] score of 2 or more is considered a high risk for a thromboembolic event  • Warfarin prescribed    Plan  • Continue in atrial fibrillation with rate control  • Continue warfarin for antithrombotic therapy, bleeding issues  discussed  • Continue beta blocker for rhythm control    3.  History of CVA in December 2000 likely embolic from atrial fibrillation she was intolerant to Plavix and was instructed to take aspirin which she continues to take.  4.  Diabetes mellitus hemoglobin A1c goal 7.0 or less  5.  Hyperlipidemia on target dose rosuvastatin  6.  Obstructive sleep apnea compliance on CPAP  7.  History of pulmonary hypertension followed by pulmonary with Dr. Coburn  8.  Morbid obesity BMI 44.4-she reports making healthier food choices.  She is not really performing structured exercise but stays active.  She would benefit from weight loss  9. History of syncope secondary to hypotension no recurrence.  No dizziness or lightheadedness.  10.  Ischemic cardiomyopathy left ventricular ejection fraction improved from 23-35-40% in 05/2018.    She appears stable, weights have been stable, no orthopnea, PND, increased dyspnea, or increased lower extremity edema.  It appears stable on current dose of furosemide.  Continue the same.  She is to call with new symptoms or weight gain.  Heart Failure  Assessment  • NYHA class II - There is slight limitation of physical activity. The patient is comfortable at rest, but physical activity results in fatigue, palpitations or shortness of breath.  • The patient was newly diagnosed with heart failure within the past 12 months  • Beta blocker prescribed  • Diuretics prescribed  • Angiotensin receptor blocker (ARB) prescribed  • The most recent ejection fraction is 23%  • Left ventricular function is severely reduced by qualitative assessment  • The left ventricle was last assessed on 12/8/2017    Plan  • The patient has received heart failure education on the following topics: dietary sodium restriction, medication instructions, minimizing or avoiding NSAID use, symptom management, physical activity and weight monitoring  • The heart failure care plan was discussed with the patient today including:  continuing the current program and lifestyle modifications  •  The patient was not counseled about ICD or CRT-D implantation    Subjective/Objective  • The patient reports dyspnea    • Physical exam findings negative for rales and elevated JVP.      11.  Chronic kidney disease on lasix and volume status appears stable. Switched off Bumex in 2018 for worsening renal function  12.  Bilateral carotid plaque without stenosis on duplex and 12/2017  13. 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 Call with symptoms, questions or concerns.       It has been a pleasure to participate in this patient's care.      Thank you,  KRISTIN Frey      **Malcolm Disclaimer:**  Much of this encounter note is an electronic transcription/translation of spoken language to printed text. The electronic translation of spoken language may permit erroneous, or at times, nonsensical words or phrases to be inadvertently transcribed. Although I have reviewed the note for such errors, some may still exist.

## 2019-04-17 NOTE — PROGRESS NOTES
Anticoagulation Clinic Progress Note    Anticoagulation Summary  As of 2019    INR goal:   2.0-3.0   TTR:   87.2 % (7 mo)   INR used for dosin.8 (2019)   Warfarin maintenance plan:   2 mg every Tue, Sat; 4 mg all other days   Weekly warfarin total:   24 mg   No change documented:   Christel Velazquez   Plan last modified:   Mike Jordan [facility] (2018)   Next INR check:   5/15/2019   Priority:   Maintenance   Target end date:   Indefinite    Indications    PAF (paroxysmal atrial fibrillation) (CMS/[facility]) [I48.0]             Anticoagulation Episode Summary     INR check location:       Preferred lab:       Send INR reminders to:    [facility]    Comments:         Anticoagulation Care Providers     Provider Role Specialty Phone number    Rommel Veliz MD Referring Cardiology [phone]          Clinic Interview:  Patient Findings     Negatives:   Signs/symptoms of thrombosis, Signs/symptoms of bleeding,   Laboratory test error suspected, Change in health, Change in alcohol use,   Change in activity, Upcoming invasive procedure, Emergency department   visit, Upcoming dental procedure, Missed doses, Extra doses, Change in   medications, Change in diet/appetite, Hospital admission, Bruising, Other   complaints      Clinical Outcomes     Negatives:   Major bleeding event, Thromboembolic event,   Anticoagulation-related hospital admission, Anticoagulation-related ED   visit, Anticoagulation-related fatality        INR History:  Anticoagulation Monitoring 2019 3/19/2019 2019   INR 3.0 3.0 2.8   INR Date 2019 3/19/2019 2019   INR Goal 2.0-3.0 2.0-3.0 2.0-3.0   Trend Same Same Same   Last Week Total 24 mg 24 mg 24 mg   Next Week Total 24 mg 24 mg 24 mg   Sun 4 mg 4 mg 4 mg   Mon 4 mg 4 mg 4 mg   Tue 2 mg 2 mg 2 mg   Wed 4 mg 4 mg 4 mg   Thu 4 mg 4 mg 4 mg   Fri 4 mg 4 mg 4 mg   Sat 2 mg 2 mg 2 mg   Visit Report - - -   Some recent data might be hidden        Plan:  1. INR is therapeutic today- see above in Anticoagulation Summary.   Will instruct Ciatlyn GARRETT Longoria to continue their warfarin regimen- see above in Anticoagulation Summary.  2. Follow up in 4 weeks.  3. Patient declines warfarin refills.  4. Verbal and written information provided. Patient expresses understanding and has no further questions at this time.    Christel Velazquez

## 2019-05-06 RX ORDER — WARFARIN SODIUM 4 MG/1
TABLET ORAL
Qty: 90 TABLET | Refills: 0 | Status: SHIPPED | OUTPATIENT
Start: 2019-05-06 | End: 2019-08-18 | Stop reason: SDUPTHER

## 2019-05-15 ENCOUNTER — ANTICOAGULATION VISIT (OUTPATIENT)
Dept: PHARMACY | Facility: HOSPITAL | Age: 84
End: 2019-05-15

## 2019-05-15 DIAGNOSIS — I48.0 PAF (PAROXYSMAL ATRIAL FIBRILLATION) (HCC): ICD-10-CM

## 2019-05-15 LAB
INR PPP: 3.3 (ref 0.91–1.09)
PROTHROMBIN TIME: 39.3 SECONDS (ref 10–13.8)

## 2019-05-15 PROCEDURE — 36416 COLLJ CAPILLARY BLOOD SPEC: CPT

## 2019-05-15 PROCEDURE — 85610 PROTHROMBIN TIME: CPT

## 2019-05-15 PROCEDURE — G0463 HOSPITAL OUTPT CLINIC VISIT: HCPCS

## 2019-05-15 NOTE — PROGRESS NOTES
Anticoagulation Clinic Progress Note    Anticoagulation Summary  As of 5/15/2019    INR goal:   2.0-3.0   TTR:   81.6 % (7.9 mo)   INR used for dosing:   3.3! (5/15/2019)   Warfarin maintenance plan:   2 mg every Tue, Sat; 4 mg all other days   Weekly warfarin total:   24 mg   Plan last modified:   Pao Levi RPH (5/15/2019)   Next INR check:   6/5/2019   Priority:   Maintenance   Target end date:   Indefinite    Indications    PAF (paroxysmal atrial fibrillation) (CMS/HCC) [I48.0]             Anticoagulation Episode Summary     INR check location:       Preferred lab:       [facility]ers [facility]:    [facility]    Comments:         Anticoagulation Care Providers     Provider Role Specialty Phone number    Rommel Veliz MD Referring Cardiology [phone]          Clinic Interview:      INR History:  Anticoagulation Monitoring 3/19/2019 4/17/2019 5/15/2019   INR 3.0 2.8 3.3   INR Date 3/19/2019 4/17/2019 5/15/2019   INR Goal 2.0-3.0 2.0-3.0 2.0-3.0   Trend Same Same Same   Last Week Total 24 mg 24 mg 24 mg   Next Week Total 24 mg 24 mg 22 mg   Sun 4 mg 4 mg 4 mg   Mon 4 mg 4 mg 4 mg   Tue 2 mg 2 mg 2 mg   Wed 4 mg 4 mg 2 mg (5/15); Otherwise 4 mg   Thu 4 mg 4 mg 4 mg   Fri 4 mg 4 mg 4 mg   Sat 2 mg 2 mg 2 mg   Visit Report - - -   Some recent data might be hidden       Plan:  1. INR is Supratherapeutic today- see above in Anticoagulation Summary.  Will instruct Caitlyn GARRETT [employer] to Continue their warfarin regimen- see above in Anticoagulation Summary. Will take warfarin 2mg today (1/2 dose) only.  2. Follow up in 3 weeks  3. Patient declines warfarin refills.  4. Verbal and written information provided. Patient expresses understanding and has no further questions at this time.    Pao Levi RPH

## 2019-05-16 ENCOUNTER — TELEPHONE (OUTPATIENT)
Dept: ORTHOPEDIC SURGERY | Facility: CLINIC | Age: 84
End: 2019-05-16

## 2019-05-16 NOTE — TELEPHONE ENCOUNTER
Notified patient of MLL reply and notified patient to arrive at 3:15 for 3:30 appt with MLL tomorrow Fri 5/17 at [facility] for OPNC / LEFT KNEE / NXR / per MLL.     Gave add-on note to Krista to reschedule RBB appt from 5/21 at [place] to MLL tomorrow 5/17 at [facility].

## 2019-05-16 NOTE — TELEPHONE ENCOUNTER
"Patient \"has had two episodes of sepsis (one in 2014 & one in 2016) in right knee, but Left knee hurting currently- has been hurting since Sat 5/11/19, each day has gotten a little worse\" Patient was scheduled for 1st available OPSE / Left Knee / NXR to see RBB Tues 5/21/19 at at [place] - but patient stating she can't walk and asking to be seen today or tomorrow. Please advise. Thanks /srh  "

## 2019-05-17 ENCOUNTER — OFFICE VISIT (OUTPATIENT)
Dept: ORTHOPEDIC SURGERY | Facility: CLINIC | Age: 84
End: 2019-05-17

## 2019-05-17 ENCOUNTER — TRANSCRIBE ORDERS (OUTPATIENT)
Dept: ADMINISTRATIVE | Facility: HOSPITAL | Age: 84
End: 2019-05-17

## 2019-05-17 ENCOUNTER — LAB (OUTPATIENT)
Dept: LAB | Facility: HOSPITAL | Age: 84
End: 2019-05-17

## 2019-05-17 VITALS — TEMPERATURE: 97.7 F | WEIGHT: 226 LBS | HEIGHT: 62 IN | BODY MASS INDEX: 41.59 KG/M2

## 2019-05-17 DIAGNOSIS — G89.0 THALAMIC PAIN SYNDROME: Primary | ICD-10-CM

## 2019-05-17 DIAGNOSIS — M25.562 LEFT KNEE PAIN, UNSPECIFIED CHRONICITY: Primary | ICD-10-CM

## 2019-05-17 DIAGNOSIS — G89.0 THALAMIC PAIN SYNDROME: ICD-10-CM

## 2019-05-17 LAB
CRP SERPL-MCNC: 0.22 MG/DL (ref 0–0.5)
ERYTHROCYTE [SEDIMENTATION RATE] IN BLOOD: 10 MM/HR (ref 0–30)

## 2019-05-17 PROCEDURE — 86140 C-REACTIVE PROTEIN: CPT

## 2019-05-17 PROCEDURE — 99213 OFFICE O/P EST LOW 20 MIN: CPT | Performed by: NURSE PRACTITIONER

## 2019-05-17 PROCEDURE — 73562 X-RAY EXAM OF KNEE 3: CPT | Performed by: NURSE PRACTITIONER

## 2019-05-17 PROCEDURE — 36415 COLL VENOUS BLD VENIPUNCTURE: CPT

## 2019-05-17 PROCEDURE — 85652 RBC SED RATE AUTOMATED: CPT

## 2019-05-17 NOTE — PROGRESS NOTES
Patient: Caitlyn Longoria  YOB: 1934 84 y.o. female  Medical Record Number: [id number]    Chief Complaints:   Chief Complaint   Patient presents with   • Left Knee - Follow-up       History of Present Illness:Caitlyn Longoria is a 84 y.o. female who presents with complaints of left knee pain.  Patient has had some intermittent left knee soreness, but apparently the patient started a new exercise tape last weekend, her left knee became very sore, patient was very concerned because she has a history of right knee infection but has been taking her antibiotic as prescribed by infectious disease and has not missed a dose.  She denies any fever, chills, redness, or swelling.    Allergies:   Allergies   Allergen Reactions   • Accupril [Quinapril Hcl] Delirium     Swelling, HA,, confusion and constipation per pt   • Ahist [Chlorpheniramine] Nausea Only, Other (See Comments) and Dizziness     Headache, Blurred vision   • Chlorcyclizine Unknown (See Comments)   • Clarithromycin Nausea Only     Other reaction(s): Headache, Depression, Flushed   • Diclofenac Sodium Unknown (See Comments)   • Diphenhydramine      Benadryl   • Esomeprazole GI Intolerance     Nexium. Stomach issues   • Ibuprofen Other (See Comments)     Does not recall    • Levalbuterol Swelling     Xopenex   • Levocetirizine Other (See Comments)     Xyzal. Diarrhea, Stomach cramps   • Lipitor [Atorvastatin] Other (See Comments)     Muscle pain and weakness, dark urine   • Lodine [Etodolac]    • Omeprazole Nausea Only     Prilosec. Headache   • Pravachol [Pravastatin] Nausea Only and Other (See Comments)     Bloated, Constipation, Headaches   • Quinapril Swelling and Confusion     Accupril. Headaches, constipation   • Sulfa Antibiotics    • Sulindac Myalgia     Other reaction(s): Headache, joint pain, bruising   • Valdecoxib Irritability   • Prednisone Rash       Medications:   Current Outpatient Medications   Medication Sig Dispense Refill   •  acetaminophen (TYLENOL) 500 MG tablet Take 500 mg by mouth Every 6 (Six) Hours As Needed for Mild Pain .     • amoxicillin (AMOXIL) 500 MG capsule Take 4 capsule one hour prior to dental procedure 4 capsule 0   • ASPIRIN 81 PO Take 81 mg by mouth Daily.     • azelastine (OPTIVAR) 0.05 % ophthalmic solution Administer 1 drop to both eyes 2 (Two) Times a Day.     • BREO ELLIPTA 100-25 MCG/INH inhaler      • Calcium Carbonate-Vitamin D (CALCIUM-VITAMIN D) 500-200 MG-UNIT tablet per tablet TAKE TWO TABLETS BY MOUTH DAILY     • carvedilol (COREG) 3.125 MG tablet Take 1 tablet by mouth 2 (Two) Times a Day. 180 tablet 3   • cefadroxil (DURICEF) 500 MG capsule Take 1 capsule by mouth Every 12 (Twelve) Hours for 90 days. 180 capsule 3   • cetirizine (ZyrTEC) 10 MG tablet Take 10 mg by mouth Daily.     • clobetasol (TEMOVATE) 0.05 % external solution      • clotrimazole-betamethasone (LOTRISONE) 1-0.05 % cream Apply 1 application topically 2 (Two) Times a Day As Needed.     • dry mouth gel (BIOTENE ORALBALANCE) gel Apply  to the mouth or throat As Needed for Dry Mouth.     • fluticasone (FLONASE) 50 MCG/ACT nasal spray 2 sprays into the nostril(s) as directed by provider Daily.     • furosemide (LASIX) 20 MG tablet Take 20 mg by mouth 2 (Two) Times a Day.     • glipiZIDE (GLUCOTROL) 5 MG tablet Take one half tablet by  Mouth every morning     • ketoconazole (NIZORAL) 2 % shampoo Apply  topically 2 (Two) Times a Week.     • loperamide (IMODIUM) 2 MG capsule Take 2 mg by mouth Every Other Day.     • losartan (COZAAR) 25 MG tablet TAKE ONE TABLET BY MOUTH DAILY 90 tablet 3   • Omega-3 Fatty Acids (FISH OIL) 1000 MG capsule capsule Take 1,000 mg by mouth 2 (Two) Times a Day With Meals.     • oxazepam (SERAX) 10 MG capsule Take 1 capsule by mouth Every Night. 5 capsule 0   • oxybutynin XL (DITROPAN-XL) 10 MG 24 hr tablet Take 10 mg by mouth Daily.     • rosuvastatin (CRESTOR) 20 MG tablet Take 20 mg by mouth Every Night.     •  "warfarin (COUMADIN) 4 MG tablet TAKE ONE TABLET BY MOUTH DAILY 90 tablet 0   • ACETYLCYSTEINE PO Take 1,000 mg by mouth Daily.     • Albuterol (VENTOLIN IN) Inhale 3 (Three) Times a Day.     • vitamin B-12 (CYANOCOBALAMIN) 1000 MCG tablet Take 1 tablet by mouth Daily. 30 tablet 12     No current facility-administered medications for this visit.          The following portions of the patient's history were reviewed and updated as appropriate: allergies, current medications, past family history, past medical history, past social history, past surgical history and problem list.    Review of Systems:   A 14 point review of systems was performed. All systems negative except pertinent positives/negative listed in HPI above    Physical Exam:   Vitals:    05/17/19 1533   Temp: 97.7 °F (36.5 °C)   Weight: 103 kg (226 lb)   Height: 157.5 cm (62\")       General: A and O x 3, ASA, NAD    SCLERA:    Normal    DENTITION:   Normal  Skin clear no unusual lesions noted  Left knee patient is a well-healed surgical incision noted no unusual erythema ecchymosis warmth or swelling noted.  She has good range of motion with no instability calf is soft and nontender    Radiology:  Xrays 3views (ap,lateral, sunrise) left knee were ordered and reviewed today secondary to pain show well-placed well-positioned left total knee replacement.  Compared to views are unchanged     assessment/Plan:  Painful left total knee replacement with history of infection on the right    We will proceed with a CRP, sed rate, bone scan, three-phase.  She will continue with ice, elevation, rest, hold off on exercise.  Patient will call immediately if she develops any fever, chills, swelling or increased pain otherwise she will follow-up with us in a couple of weeks  "

## 2019-05-23 ENCOUNTER — HOSPITAL ENCOUNTER (OUTPATIENT)
Dept: NUCLEAR MEDICINE | Facility: HOSPITAL | Age: 84
Discharge: HOME | End: 2019-05-23

## 2019-05-23 DIAGNOSIS — M25.562 LEFT KNEE PAIN, UNSPECIFIED CHRONICITY: ICD-10-CM

## 2019-05-23 PROCEDURE — 0 TECHNETIUM MEDRONATE KIT: Performed by: NURSE PRACTITIONER

## 2019-05-23 PROCEDURE — 78315 BONE IMAGING 3 PHASE: CPT

## 2019-05-23 PROCEDURE — A9503 TC99M MEDRONATE: HCPCS | Performed by: NURSE PRACTITIONER

## 2019-05-23 RX ORDER — TC 99M MEDRONATE 20 MG/10ML
21.6 INJECTION, POWDER, LYOPHILIZED, FOR SOLUTION INTRAVENOUS
Status: COMPLETED | OUTPATIENT
Start: 2019-05-23 | End: 2019-05-23

## 2019-05-23 RX ADMIN — Medication 21.6 MILLICURIE: at 10:21

## 2019-05-24 ENCOUNTER — OFFICE (OUTPATIENT)
Dept: URBAN - METROPOLITAN AREA CLINIC 65 | Facility: CLINIC | Age: 84
End: 2019-05-24
Payer: COMMERCIAL

## 2019-05-24 VITALS
DIASTOLIC BLOOD PRESSURE: 80 MMHG | SYSTOLIC BLOOD PRESSURE: 118 MMHG | HEART RATE: 92 BPM | WEIGHT: 227 LBS | HEIGHT: 65 IN

## 2019-05-24 DIAGNOSIS — Z80.0 FAMILY HISTORY OF MALIGNANT NEOPLASM OF DIGESTIVE ORGANS: ICD-10-CM

## 2019-05-24 DIAGNOSIS — K57.30 DIVERTICULOSIS OF LARGE INTESTINE WITHOUT PERFORATION OR ABS: ICD-10-CM

## 2019-05-24 DIAGNOSIS — R05 COUGH: ICD-10-CM

## 2019-05-24 DIAGNOSIS — K21.9 GASTRO-ESOPHAGEAL REFLUX DISEASE WITHOUT ESOPHAGITIS: ICD-10-CM

## 2019-05-24 DIAGNOSIS — Z83.71 FAMILY HISTORY OF COLONIC POLYPS: ICD-10-CM

## 2019-05-24 DIAGNOSIS — K31.84 GASTROPARESIS: ICD-10-CM

## 2019-05-24 PROCEDURE — 99213 OFFICE O/P EST LOW 20 MIN: CPT | Performed by: INTERNAL MEDICINE

## 2019-05-30 ENCOUNTER — OFFICE VISIT (OUTPATIENT)
Dept: ORTHOPEDIC SURGERY | Facility: CLINIC | Age: 84
End: 2019-05-30

## 2019-05-30 VITALS — TEMPERATURE: 97.7 F | WEIGHT: 225 LBS | BODY MASS INDEX: 37.49 KG/M2 | HEIGHT: 65 IN

## 2019-05-30 DIAGNOSIS — Z96.652 STATUS POST LEFT KNEE REPLACEMENT: Primary | ICD-10-CM

## 2019-05-30 PROCEDURE — 99212 OFFICE O/P EST SF 10 MIN: CPT | Performed by: ORTHOPAEDIC SURGERY

## 2019-05-30 NOTE — PROGRESS NOTES
Patient: Caitlyn Longoria  YOB: 1934 84 y.o. female  Medical Record Number: [id number]    Chief Complaints:   Chief Complaint   Patient presents with   • Left Knee - Follow-up       History of Present Illness:Caitlyn Longoria is a 84 y.o. female who presents for follow-up of left total knee replacement she saw Elsa few weeks back and had pain following a series of exercises.  She was sent for bone scan and labs.  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  Today she states she is feeling much better and her pain has gradually improved over the last week or 2.    Allergies:   Allergies   Allergen Reactions   • Accupril [Quinapril Hcl] Delirium     Swelling, HA,, confusion and constipation per pt   • Ahist [Chlorpheniramine] Nausea Only, Other (See Comments) and Dizziness     Headache, Blurred vision   • Chlorcyclizine Unknown (See Comments)   • Clarithromycin Nausea Only     Other reaction(s): Headache, Depression, Flushed   • Diclofenac Sodium Unknown (See Comments)   • Diphenhydramine      Benadryl   • Esomeprazole GI Intolerance     Nexium. Stomach issues   • Ibuprofen Other (See Comments)     Does not recall    • Levalbuterol Swelling     Xopenex   • Levocetirizine Other (See Comments)     Xyzal. Diarrhea, Stomach cramps   • Lipitor [Atorvastatin] Other (See Comments)     Muscle pain and weakness, dark urine   • Lodine [Etodolac]    • Omeprazole Nausea Only     Prilosec. Headache   • Pravachol [Pravastatin] Nausea Only and Other (See Comments)     Bloated, Constipation, Headaches   • Quinapril Swelling and Confusion     Accupril. Headaches, constipation   • Sulfa Antibiotics    • Sulindac Myalgia     Other reaction(s): Headache, joint pain, bruising   • Valdecoxib Irritability   • Prednisone Rash       Medications:   Current Outpatient Medications   Medication Sig Dispense Refill   • acetaminophen (TYLENOL) 500 MG tablet Take 500 mg by mouth Every 6 (Six) Hours As Needed  for Mild Pain .     • ACETYLCYSTEINE PO Take 1,000 mg by mouth Daily.     • Albuterol (VENTOLIN IN) Inhale 3 (Three) Times a Day.     • amoxicillin (AMOXIL) 500 MG capsule Take 4 capsule one hour prior to dental procedure 4 capsule 0   • ASPIRIN 81 PO Take 81 mg by mouth Daily.     • azelastine (OPTIVAR) 0.05 % ophthalmic solution Administer 1 drop to both eyes 2 (Two) Times a Day.     • BREO ELLIPTA 100-25 MCG/INH inhaler      • Calcium Carbonate-Vitamin D (CALCIUM-VITAMIN D) 500-200 MG-UNIT tablet per tablet TAKE TWO TABLETS BY MOUTH DAILY     • carvedilol (COREG) 3.125 MG tablet Take 1 tablet by mouth 2 (Two) Times a Day. 180 tablet 3   • cefadroxil (DURICEF) 500 MG capsule Take 1 capsule by mouth Every 12 (Twelve) Hours for 90 days. 180 capsule 3   • cetirizine (ZyrTEC) 10 MG tablet Take 10 mg by mouth Daily.     • clobetasol (TEMOVATE) 0.05 % external solution      • clotrimazole-betamethasone (LOTRISONE) 1-0.05 % cream Apply 1 application topically 2 (Two) Times a Day As Needed.     • dry mouth gel (BIOTENE ORALBALANCE) gel Apply  to the mouth or throat As Needed for Dry Mouth.     • fluticasone (FLONASE) 50 MCG/ACT nasal spray 2 sprays into the nostril(s) as directed by provider Daily.     • furosemide (LASIX) 20 MG tablet Take 20 mg by mouth 2 (Two) Times a Day.     • glipiZIDE (GLUCOTROL) 5 MG tablet Take one half tablet by  Mouth every morning     • ketoconazole (NIZORAL) 2 % shampoo Apply  topically 2 (Two) Times a Week.     • loperamide (IMODIUM) 2 MG capsule Take 2 mg by mouth Every Other Day.     • losartan (COZAAR) 25 MG tablet TAKE ONE TABLET BY MOUTH DAILY 90 tablet 3   • Omega-3 Fatty Acids (FISH OIL) 1000 MG capsule capsule Take 1,000 mg by mouth 2 (Two) Times a Day With Meals.     • oxazepam (SERAX) 10 MG capsule Take 1 capsule by mouth Every Night. 5 capsule 0   • oxybutynin XL (DITROPAN-XL) 10 MG 24 hr tablet Take 10 mg by mouth Daily.     • rosuvastatin (CRESTOR) 20 MG tablet Take 20 mg by  "mouth Every Night.     • vitamin B-12 (CYANOCOBALAMIN) 1000 MCG tablet Take 1 tablet by mouth Daily. 30 tablet 12   • warfarin (COUMADIN) 4 MG tablet TAKE ONE TABLET BY MOUTH DAILY 90 tablet 0     No current facility-administered medications for this visit.          The following portions of the patient's history were reviewed and updated as appropriate: allergies, current medications, past family history, past medical history, past social history, past surgical history and problem list.    Review of Systems:   A 14 point review of systems was performed. All systems negative except pertinent positives/negative listed in HPI above    Physical Exam:   Vitals:    05/30/19 1408   Temp: 97.7 °F (36.5 °C)   Weight: 102 kg (225 lb)   Height: 165.1 cm (65\")       General: A and O x 3, ASA, NAD    SCLERA:    Normal    DENTITION:   Normal  Incisions well-healed there is no joint effusion she is range of motion from roughly 0-1 20 with no instability she is walking with a slow but nonantalgic gait    Radiology:  Xrays 3views left knee  (ap,lateral, sunrise) taken previously demonstratinga well positioned knee replacement without evidence of wear, loosening or osteolysis  I reviewed the bone scan films and report was her essentially normal    Sed rate and C-reactive protein are normal    Assessment/Plan:  Left knee pain which is mostly resolved at this point her labs are normal bone scans normal and clinically she is improved so I recommend she follow-up in 1 year continue on her Keflex and her symptoms come back she will call me  "

## 2019-06-05 ENCOUNTER — ANTICOAGULATION VISIT (OUTPATIENT)
Dept: PHARMACY | Facility: HOSPITAL | Age: 84
End: 2019-06-05

## 2019-06-05 DIAGNOSIS — I48.0 PAF (PAROXYSMAL ATRIAL FIBRILLATION) (HCC): ICD-10-CM

## 2019-06-05 LAB
INR PPP: 2.5 (ref 0.91–1.09)
PROTHROMBIN TIME: 30.5 SECONDS (ref 10–13.8)

## 2019-06-05 PROCEDURE — 36416 COLLJ CAPILLARY BLOOD SPEC: CPT

## 2019-06-05 PROCEDURE — 85610 PROTHROMBIN TIME: CPT

## 2019-06-05 NOTE — PROGRESS NOTES
Anticoagulation Clinic Progress Note    Anticoagulation Summary  As of 2019    INR goal:   2.0-3.0   TTR:   80.1 % (8.6 mo)   INR used for dosin.5 (2019)   Warfarin maintenance plan:   2 mg every Tue, Sat; 4 mg all other days   Weekly warfarin total:   24 mg   No change documented:   Christel Velazquez   Plan last modified:   Pao Levi RPH (5/15/2019)   Next INR check:   7/3/2019   Priority:   Maintenance   Target end date:   Indefinite    Indications    PAF (paroxysmal atrial fibrillation) (CMS/[facility]) [I48.0]             Anticoagulation Episode Summary     INR check location:       Preferred lab:       Send INR reminders to:    [facility]    Comments:         Anticoagulation Care Providers     Provider Role Specialty Phone number    Rommel Veliz MD Referring Cardiology [phone]          Clinic Interview:  Patient Findings     Negatives:   Signs/symptoms of thrombosis, Signs/symptoms of bleeding,   Laboratory test error suspected, Change in health, Change in alcohol use,   Change in activity, Upcoming invasive procedure, Emergency department   visit, Upcoming dental procedure, Missed doses, Extra doses, Change in   medications, Change in diet/appetite, Hospital admission, Bruising, Other   complaints      Clinical Outcomes     Negatives:   Major bleeding event, Thromboembolic event,   Anticoagulation-related hospital admission, Anticoagulation-related ED   visit, Anticoagulation-related fatality        INR History:  Anticoagulation Monitoring 2019 5/15/2019 2019   INR 2.8 3.3 2.5   INR Date 2019 5/15/2019 2019   INR Goal 2.0-3.0 2.0-3.0 2.0-3.0   Trend Same Same Same   Last Week Total 24 mg 24 mg 24 mg   Next Week Total 24 mg 22 mg 24 mg   Sun 4 mg 4 mg 4 mg   Mon 4 mg 4 mg 4 mg   Tue 2 mg 2 mg 2 mg   Wed 4 mg 2 mg (5/15); Otherwise 4 mg 4 mg   Thu 4 mg 4 mg 4 mg   Fri 4 mg 4 mg 4 mg   Sat 2 mg 2 mg 2 mg   Visit Report - - -   Some recent data might be  hidden       Plan:  1. INR is therapeutic today- see above in Anticoagulation Summary.   Will instruct Caitlyn GARRETT Longoria to continue their warfarin regimen- see above in Anticoagulation Summary.  2. Follow up in 4 weeks.  3. Patient declines warfarin refills.  4. Verbal and written information provided. Patient expresses understanding and has no further questions at this time.    Christel Velazquez

## 2019-07-03 ENCOUNTER — ANTICOAGULATION VISIT (OUTPATIENT)
Dept: PHARMACY | Facility: HOSPITAL | Age: 84
End: 2019-07-03

## 2019-07-03 DIAGNOSIS — I48.0 PAF (PAROXYSMAL ATRIAL FIBRILLATION) (HCC): ICD-10-CM

## 2019-07-03 LAB
INR PPP: 2.7 (ref 0.91–1.09)
PROTHROMBIN TIME: 32 SECONDS (ref 10–13.8)

## 2019-07-03 PROCEDURE — 85610 PROTHROMBIN TIME: CPT

## 2019-07-03 PROCEDURE — 36416 COLLJ CAPILLARY BLOOD SPEC: CPT

## 2019-07-03 NOTE — PROGRESS NOTES
Anticoagulation Clinic Progress Note    Anticoagulation Summary  As of 7/3/2019    INR goal:   2.0-3.0   TTR:   82.0 % (9.5 mo)   INR used for dosin.7 (7/3/2019)   Warfarin maintenance plan:   2 mg every e, Sat; 4 mg all other days   Weekly warfarin total:   24 mg   No change documented:   Sheri Vinson   Plan last modified:   Pao Levi RPH (5/15/2019)   Next INR check:   2019   Priority:   Maintenance   Target end date:   Indefinite    Indications    PAF (paroxysmal atrial fibrillation) (CMS/[facility]) [I48.0]             Anticoagulation Episode Summary     INR check location:       Preferred lab:       Send INR reminders to:    [facility]    Comments:         Anticoagulation Care Providers     Provider Role Specialty Phone number    Rommel Veliz MD Referring Cardiology [phone]          Clinic Interview:  Patient Findings     Positives:   Change in health, Change in medications    Negatives:   Signs/symptoms of thrombosis, Signs/symptoms of bleeding,   Laboratory test error suspected, Change in alcohol use, Change in   activity, Upcoming invasive procedure, Emergency department visit,   Upcoming dental procedure, Missed doses, Extra doses, Change in   diet/appetite, Hospital admission, Bruising, Other complaints    Comments:   UTI.   Started nitrofurantonin  for 4 days   started Cipro for 1 day   started flagyl for 14 days (2 days left)      Clinical Outcomes     Negatives:   Major bleeding event, Thromboembolic event,   Anticoagulation-related hospital admission, Anticoagulation-related ED   visit, Anticoagulation-related fatality    Comments:   UTI.   Started nitrofurantonin /24 for 4 days   started Cipro for 1 day   started flagyl for 14 days (2 days left)        INR History:  Anticoagulation Monitoring 5/15/2019 2019 7/3/2019   INR 3.3 2.5 2.7   INR Date 5/15/2019 2019 7/3/2019   INR Goal 2.0-3.0 2.0-3.0 2.0-3.0   Trend Same Same Same    Last Week Total 24 mg 24 mg 24 mg   Next Week Total 22 mg 24 mg 24 mg   Sun 4 mg 4 mg 4 mg   Mon 4 mg 4 mg 4 mg   Tue 2 mg 2 mg 2 mg   Wed 2 mg (5/15); Otherwise 4 mg 4 mg 4 mg   Thu 4 mg 4 mg 4 mg   Fri 4 mg 4 mg 4 mg   Sat 2 mg 2 mg 2 mg   Visit Report - - -   Some recent data might be hidden       Plan:  1. INR is therapeutic today- see above in Anticoagulation Summary.   Will instruct Caitlyn Longoria to continue their warfarin regimen- see above in Anticoagulation Summary.  2. Follow up in 4 weeks.  3. Patient declines warfarin refills.  4. Verbal and written information provided. Patient expresses understanding and has no further questions at this time.    Sheri Vinson

## 2019-07-24 ENCOUNTER — CLINICAL SUPPORT NO REQUIREMENTS (OUTPATIENT)
Dept: CARDIOLOGY | Facility: CLINIC | Age: 84
End: 2019-07-24

## 2019-07-24 DIAGNOSIS — I49.5 SICK SINUS SYNDROME (HCC): Primary | ICD-10-CM

## 2019-07-24 PROCEDURE — 93294 REM INTERROG EVL PM/LDLS PM: CPT | Performed by: INTERNAL MEDICINE

## 2019-07-24 PROCEDURE — 93296 REM INTERROG EVL PM/IDS: CPT | Performed by: INTERNAL MEDICINE

## 2019-07-25 ENCOUNTER — TRANSCRIBE ORDERS (OUTPATIENT)
Dept: ADMINISTRATIVE | Facility: HOSPITAL | Age: 84
End: 2019-07-25

## 2019-07-25 DIAGNOSIS — R13.10 DYSPHAGIA, UNSPECIFIED TYPE: Primary | ICD-10-CM

## 2019-07-31 ENCOUNTER — OFFICE VISIT (OUTPATIENT)
Dept: ORTHOPEDIC SURGERY | Facility: CLINIC | Age: 84
End: 2019-07-31

## 2019-07-31 VITALS — TEMPERATURE: 98.6 F | HEIGHT: 64 IN | WEIGHT: 225 LBS | BODY MASS INDEX: 38.41 KG/M2

## 2019-07-31 DIAGNOSIS — M41.9 ACQUIRED SCOLIOSIS: ICD-10-CM

## 2019-07-31 DIAGNOSIS — M54.50 LUMBAR SPINE PAIN: Primary | ICD-10-CM

## 2019-07-31 PROCEDURE — 99213 OFFICE O/P EST LOW 20 MIN: CPT | Performed by: ORTHOPAEDIC SURGERY

## 2019-07-31 PROCEDURE — 72100 X-RAY EXAM L-S SPINE 2/3 VWS: CPT | Performed by: ORTHOPAEDIC SURGERY

## 2019-07-31 RX ORDER — CEFADROXIL 500 MG/1
500 CAPSULE ORAL 2 TIMES DAILY
COMMUNITY
End: 2020-03-02 | Stop reason: ALTCHOICE

## 2019-08-01 ENCOUNTER — ANTICOAGULATION VISIT (OUTPATIENT)
Dept: PHARMACY | Facility: HOSPITAL | Age: 84
End: 2019-08-01

## 2019-08-01 DIAGNOSIS — I48.0 PAF (PAROXYSMAL ATRIAL FIBRILLATION) (HCC): ICD-10-CM

## 2019-08-01 DIAGNOSIS — M54.50 LUMBAR SPINE PAIN: Primary | ICD-10-CM

## 2019-08-01 LAB
INR PPP: 2.3 (ref 0.91–1.09)
PROTHROMBIN TIME: 27.5 SECONDS (ref 10–13.8)

## 2019-08-01 PROCEDURE — 36416 COLLJ CAPILLARY BLOOD SPEC: CPT

## 2019-08-01 PROCEDURE — 85610 PROTHROMBIN TIME: CPT

## 2019-08-01 NOTE — PROGRESS NOTES
Anticoagulation Clinic Progress Note    Anticoagulation Summary  As of 2019    INR goal:   2.0-3.0   TTR:   83.7 % (10.5 mo)   INR used for dosin.3 (2019)   Warfarin maintenance plan:   2 mg every Tue, Sat; 4 mg all other days   Weekly warfarin total:   24 mg   No change documented:   Sheri Vinson   Plan last modified:   Pao Levi RPH (5/15/2019)   Next INR check:   2019   Priority:   Maintenance   Target end date:   Indefinite    Indications    PAF (paroxysmal atrial fibrillation) (CMS/[facility]) [I48.0]             Anticoagulation Episode Summary     INR check location:       Preferred lab:       Send INR reminders to:    [facility]    Comments:         Anticoagulation Care Providers     Provider Role Specialty Phone number    Rommel Veliz MD Referring Cardiology [phone]          Clinic Interview:  Patient Findings     Negatives:   Signs/symptoms of thrombosis, Signs/symptoms of bleeding,   Laboratory test error suspected, Change in health, Change in alcohol use,   Change in activity, Upcoming invasive procedure, Emergency department   visit, Upcoming dental procedure, Missed doses, Extra doses, Change in   medications, Change in diet/appetite, Hospital admission, Bruising, Other   complaints      Clinical Outcomes     Negatives:   Major bleeding event, Thromboembolic event,   Anticoagulation-related hospital admission, Anticoagulation-related ED   visit, Anticoagulation-related fatality        INR History:  Anticoagulation Monitoring 2019 7/3/2019 2019   INR 2.5 2.7 2.3   INR Date 2019 7/3/2019 2019   INR Goal 2.0-3.0 2.0-3.0 2.0-3.0   Trend Same Same Same   Last Week Total 24 mg 24 mg 24 mg   Next Week Total 24 mg 24 mg 24 mg   Sun 4 mg 4 mg 4 mg   Mon 4 mg 4 mg 4 mg   Tue 2 mg 2 mg 2 mg   Wed 4 mg 4 mg 4 mg   Thu 4 mg 4 mg 4 mg   Fri 4 mg 4 mg 4 mg   Sat 2 mg 2 mg 2 mg   Visit Report - - -   Some recent data might be hidden       Plan:  1. INR  is therapeutic today- see above in Anticoagulation Summary.   Will instruct Caitlyn Lognoria to continue their warfarin regimen- see above in Anticoagulation Summary.  2. Follow up in 4 weeks.  3. Patient declines warfarin refills.  4. Verbal and written information provided. Patient expresses understanding and has no further questions at this time.    Sheri Vinson

## 2019-08-06 ENCOUNTER — HOSPITAL ENCOUNTER (OUTPATIENT)
Dept: GENERAL RADIOLOGY | Facility: HOSPITAL | Age: 84
Discharge: HOME | End: 2019-08-06
Admitting: INTERNAL MEDICINE

## 2019-08-06 DIAGNOSIS — R13.10 DYSPHAGIA, UNSPECIFIED TYPE: ICD-10-CM

## 2019-08-06 PROCEDURE — 63710000001 BARIUM SULFATE 40 % SUSPENSION: Performed by: INTERNAL MEDICINE

## 2019-08-06 PROCEDURE — 92611 MOTION FLUOROSCOPY/SWALLOW: CPT

## 2019-08-06 PROCEDURE — A9270 NON-COVERED ITEM OR SERVICE: HCPCS | Performed by: INTERNAL MEDICINE

## 2019-08-06 PROCEDURE — 74230 X-RAY XM SWLNG FUNCJ C+: CPT

## 2019-08-06 PROCEDURE — 63710000001 BARIUM SULFATE 96 % RECONSTITUTED SUSPENSION: Performed by: INTERNAL MEDICINE

## 2019-08-06 PROCEDURE — 63710000001 BARIUM SULFATE 98 % RECONSTITUTED SUSPENSION: Performed by: INTERNAL MEDICINE

## 2019-08-06 RX ADMIN — BARIUM SULFATE 50 ML: 400 SUSPENSION ORAL at 13:29

## 2019-08-06 RX ADMIN — BARIUM SULFATE 65 ML: 960 POWDER, FOR SUSPENSION ORAL at 13:29

## 2019-08-06 RX ADMIN — BARIUM SULFATE 4 ML: 980 POWDER, FOR SUSPENSION ORAL at 13:29

## 2019-08-07 NOTE — MBS/VFSS/FEES
Speech Language Pathology   MBS FEES / Discharge Summary  [facility]       Patient Name: Caitlyn Longoria  : 1934  MRN: [id number]    Today's Date: 2019      Visit Date: 2019     Visit Dx:     ICD-10-CM ICD-9-CM   1. Dysphagia, unspecified type R13.10 787.20       Patient Active Problem List   Diagnosis   • Neck and shoulder pain   • Arthropathy of shoulder region   • Carpal tunnel syndrome of left wrist   • Chronic pain of both shoulders   • Chronic left shoulder pain   • Arthropathy of left shoulder   • Dysarthria   • DM type 2 (diabetes mellitus, type 2) (CMS/Formerly [facility])   • PAF (paroxysmal atrial fibrillation) (CMS/Formerly [facility])   • HLD (hyperlipidemia)   • Bronchitis   • Coronary artery disease involving native coronary artery of native heart with angina pectoris (CMS/Formerly [facility])   • CVA (cerebral vascular accident) (CMS/Formerly [facility])   • HTN (hypertension)   • CKD (chronic kidney disease) stage 3, GFR 30-59 ml/min (CMS/Formerly [facility])   • Chronic combined systolic and diastolic congestive heart failure (CMS/[facility]ly [facility])   • Infection of prosthetic right knee joint (CMS/[facility]ly [facility])   • Stasis dermatitis of right lower extremity due to peripheral venous hypertension   • Current use of long term anticoagulation   • Morbidly obese (CMS/[facility]ly [facility])        Past Medical History:   Diagnosis Date   • Aortic calcification (CMS/[facility]ly [facility])     mild, on echo 2017   • Aortic regurgitation     Trace   • CAD (coronary artery disease)    • Chronic combined systolic and diastolic congestive heart failure (CMS/[facility]ly [facility])    • CKD (chronic kidney disease) stage 3, GFR 30-59 ml/min (CMS/[facility])    • Coronary artery disease involving native coronary artery of native heart with angina pectoris (CMS/HCC)    • DM type 2 (diabetes mellitus, type 2) (CMS/[facility]ly [facility])    • Hypertension    • Mild mitral regurgitation    • Mitral annular calcification     2017- echo, moderate   • PAF (paroxysmal atrial fibrillation) (CMS/HCC)    • SSS (sick sinus syndrome) (CMS/[facility])    • Tricuspid  regurgitation     Trace        Past Surgical History:   Procedure Laterality Date   • CARDIAC CATHETERIZATION     • CHOLECYSTECTOMY     • CORONARY STENT PLACEMENT     • HERNIA REPAIR     • HYSTERECTOMY     • PACEMAKER IMPLANTATION     • REPLACEMENT TOTAL KNEE                   SLP Adult Swallow Evaluation - 08/06/19 1315        Rehab Evaluation    Document Type  evaluation   -OC    Subjective Information  no complaints   -OC    Patient Observations  alert;cooperative;agree to therapy   -OC    Patient Effort  good   -OC    Symptoms Noted During/After Treatment  none   -OC       General Information    Patient Profile Reviewed  yes   -OC    Current Method of Nutrition  regular textures;thin liquids   -OC    Precautions/Limitations, Vision  WFL   -OC    Precautions/Limitations, Hearing  WFL   -OC    Prior Level of Function-Communication  WFL   -OC    Prior Level of Function-Swallowing  no diet consistency restrictions   -OC    Plans/Goals Discussed with  patient;agreed upon   -OC    Barriers to Rehab  none identified   -OC    Patient's Goals for Discharge  -- stop coughing   -OC       Pain Assessment    Additional Documentation  Pain Scale: Numbers Pre/Post-Treatment (Group)   -OC       Pain Scale: Numbers Pre/Post-Treatment    Pain Scale: Numbers, Pretreatment  0/10 - no pain   -OC    Pain Scale: Numbers, Post-Treatment  0/10 - no pain   -OC       Oral Motor and Function    Dentition Assessment  other (see comments) present/adequate   -OC    Secretion Management  WNL/WFL   -OC       MBS/VFSS Interpretation    VFSS Summary  Pt presents with mild oropharyngeal dysphagia. Pt decreased base of tongue retraction and pharyngeal constriction.  No penetration or aspiration with thin single sip, nectar thick, puree, and regular textures. Mild base of tongue residue with puree, able to clear with repeat swallows/liquid wash. Mild thin residue pooling to pyriforms, clears I'ly with repeat swallow. Pt demonstrated penetraiton thin  large bolus. Penetration mech soft mixed X1. Esophageal scan WFL.   -OC       SLP Communication to Radiology    Summary Statement  Pt presents with mild oropharyngeal dysphagia. Pt decreased base of tongue retraction and pharyngeal constriction.  No penetration or aspiration with thin single sip, nectar thick, puree, and regular textures. Mild base of tongue residue with puree, able to clear with repeat swallows/liquid wash. Pt demonstrated penetraiton thin large bolus. Penetration mech soft mixed X1. Esophageal scan WFL.   -OC       Clinical Impression    SLP Swallowing Diagnosis  mild;oral dysfunction;pharyngeal dysfunction   -OC    Functional Impact  risk of aspiration/pneumonia   -OC    Rehab Potential/Prognosis, Swallowing  good, to achieve stated therapy goals   -OC    Swallow Criteria for Skilled Therapeutic Interventions Met  baseline status;other (see comments) hx dyphagia s/p CVA feel swallow is functional  -OC       Recommendations    Therapy Frequency (Swallow)  evaluation only   -OC    Predicted Duration Therapy Intervention (Days)  until discharge   -OC    SLP Diet Recommendation  regular textures;thin liquids;other (see comments) No mixed consistencies   -OC    Recommended Diagnostics  reassess via FEES   -OC    Recommended Precautions and Strategies  upright posture during/after eating;small bites of food and sips of liquid   -OC    SLP Rec. for Method of Medication Administration  meds whole;with pudding or applesauce   -OC    Monitor for Signs of Aspiration  yes;notify SLP if any concerns   -OC    Anticipated Dischage Disposition  assisted living facility ([facility])   -OC      User Key  (r) = Recorded By, (t) = Taken By, (c) = Cosigned By    Initials Name Provider Type    OC Mk, NENITA Thomas,[facility]-SLP Speech and Language Pathologist                         OP SLP Education     Row Name 08/06/19 2066       Education    Barriers to Learning  No barriers identified  -OC    Education Provided  Described  results of evaluation;Patient expressed understanding of evaluation  -OC    Assessed  Learning needs;Learning motivation;Learning preferences;Learning readiness  -OC    Learning Motivation  Strong  -OC    Learning Method  Explanation  -OC    Teaching Response  Verbalized understanding  -OC    Education Comments  Pt agreeable to avoiding mixed consistencies and participating in FEES as warranted. Pt reports she forgot to ask Dr. Coburn if CHF could be contributing to coughing.  -OC      User Key  (r) = Recorded By, (t) = Taken By, (c) = Cosigned By    Initials Name Effective Dates    OC Martha eTrrazas MA,CCC-SLP 06/08/18 -               OP SLP Assessment/Plan - 08/06/19 1315        SLP Assessment    Functional Problems  Swallowing   -OC    Clinical Impression: Swallowing  Mild:   -OC    Functional Problems Comment  Coughing   -OC    Clinical Impression Comments  cannot r/o micro aspiration   -OC    Please refer to paper survey for additional self-reported information  No   -OC    Please refer to items scanned into chart for additional diagnostic informaiton and handouts as provided by clinician  No   -OC    SLP Diagnosis  mild oropharyngeal dysphagia   -OC    Prognosis  Good (comment)   -OC    Patient/caregiver participated in establishment of treatment plan and goals  Yes   -OC    Patient would benefit from skilled therapy intervention  -- trial modifications    trial modifications  -OC       SLP Plan    Frequency  N/A   -OC    Duration  N/A   -OC    Planned CPT's?  SLP MBS: 48760;SLP FEES: 04047   -OC    Expected Duration Therapy Session - minutes  60-75 minutes   -OC    Plan Comments  Recommend avoiding mixed consistencies, handout provided. If cough not improved recommend FEES to r/o micro aspiration.   -OC      User Key  (r) = Recorded By, (t) = Taken By, (c) = Cosigned By    Initials Name Provider Type    Martha Mariano MA,CCC-SLP Speech and Language Pathologist              SLP Outcome Measures (last 72 hours)       SLP Outcome Measures     Row Name 08/06/19 1315             SLP Outcome Measures    Outcome Measure Used?  Adult NOMS  -OC         Adult FCM Scores    FCM Chosen  Swallowing  -OC      Swallowing FCM Score  6  -OC        User Key  (r) = Recorded By, (t) = Taken By, (c) = Cosigned By    Initials Name Effective Dates    OC Martha Terrazas MA,CCC-SLP 06/08/18 -                          Time Calculation:        Therapy Charges for Today     Code Description Service Date Service Provider Modifiers Qty    34625962490 HC ST MOTION FLUORO EVAL SWALLOW 5 8/6/2019 Martha Terrazas MA,CCC-SLP GN 1                  Martha Terrazas MA,CCC-SLP  8/7/2019

## 2019-08-15 ENCOUNTER — HOSPITAL ENCOUNTER (OUTPATIENT)
Dept: PHYSICAL THERAPY | Facility: HOSPITAL | Age: 84
Setting detail: THERAPIES SERIES
Discharge: HOME | End: 2019-08-15

## 2019-08-15 DIAGNOSIS — M54.50 CHRONIC BILATERAL LOW BACK PAIN WITHOUT SCIATICA: Primary | ICD-10-CM

## 2019-08-15 DIAGNOSIS — M79.18 BILATERAL BUTTOCK PAIN: ICD-10-CM

## 2019-08-15 DIAGNOSIS — G89.29 CHRONIC BILATERAL LOW BACK PAIN WITHOUT SCIATICA: Primary | ICD-10-CM

## 2019-08-15 PROCEDURE — 97110 THERAPEUTIC EXERCISES: CPT

## 2019-08-15 PROCEDURE — 97162 PT EVAL MOD COMPLEX 30 MIN: CPT

## 2019-08-15 NOTE — THERAPY EVALUATION
Outpatient Physical Therapy Ortho Initial Evaluation  [facility]     Patient Name: Caitlyn Longoria  : 1934  MRN: [id number]  Today's Date: 8/15/2019      Visit Date: 08/15/2019    Patient Active Problem List   Diagnosis   • Neck and shoulder pain   • Arthropathy of shoulder region   • Carpal tunnel syndrome of left wrist   • Chronic pain of both shoulders   • Chronic left shoulder pain   • Arthropathy of left shoulder   • Dysarthria   • DM type 2 (diabetes mellitus, type 2) (CMS/[facility])   • PAF (paroxysmal atrial fibrillation) (CMS/[facility])   • HLD (hyperlipidemia)   • Bronchitis   • Coronary artery disease involving native coronary artery of native heart with angina pectoris (CMS/[facility])   • CVA (cerebral vascular accident) (CMS/[facility])   • HTN (hypertension)   • CKD (chronic kidney disease) stage 3, GFR 30-59 ml/min (CMS/[facility])   • Chronic combined systolic and diastolic congestive heart failure (CMS/[facility])   • Infection of prosthetic right knee joint (CMS/[facility])   • Stasis dermatitis of right lower extremity due to peripheral venous hypertension   • Current use of long term anticoagulation   • Morbidly obese (CMS/[facility])        Past Medical History:   Diagnosis Date   • Aortic calcification (CMS/[facility])     mild, on echo 2017   • Aortic regurgitation     Trace   • CAD (coronary artery disease)    • Chronic combined systolic and diastolic congestive heart failure (CMS/[facility])    • CKD (chronic kidney disease) stage 3, GFR 30-59 ml/min (CMS/[facility])    • Coronary artery disease involving native coronary artery of native heart with angina pectoris (CMS/[facility])    • DM type 2 (diabetes mellitus, type 2) (CMS/[facility])    • Hypertension    • Mild mitral regurgitation    • Mitral annular calcification     2017- echo, moderate   • PAF (paroxysmal atrial fibrillation) (CMS/[facility])    • SSS (sick sinus syndrome) (CMS/[facility])    • Tricuspid regurgitation     Trace        Past Surgical History:   Procedure Laterality Date   • CARDIAC  CATHETERIZATION     • CHOLECYSTECTOMY     • CORONARY STENT PLACEMENT     • HERNIA REPAIR     • HYSTERECTOMY     • PACEMAKER IMPLANTATION     • REPLACEMENT TOTAL KNEE         Visit Dx:     ICD-10-CM ICD-9-CM   1. Chronic bilateral low back pain without sciatica M54.5 724.2    G89.29 338.29   2. Bilateral buttock pain M79.18 729.1         Patient History     Row Name 08/15/19 1300             History    Chief Complaint  Pain  -LB      Type of Pain  Back pain  -LB      Date Current Problem(s) Began  06/01/19  -LB      Brief Description of Current Complaint  Pt reports severe B buttocks pain that began 2-3 months ago described as tightness and severe pain in B glutes/low back. She went to Chiropractor and massage and this pain improved. She reports pain is worse when she gets out of bed and she has to sit on EOB in FF position to reduce pain. She does yoga and Arturo Chi and sits in movies. She reports chronic B LBP. This time it felt like someone was stabbing her with a knife and it radiated to B hips when she stood up.  -LB      Previous treatment for THIS PROBLEM  Chiropractor;Other (comment) massage  -LB      Patient/Caregiver Goals  Return to prior level of function;Know what to do to help the symptoms  -LB      Hand Dominance  right-handed  -LB      Occupation/sports/leisure activities  Pt enjoys yoga, Arturo Chi, going to movies.  -LB      Patient seeing anyone else for problem(s)?  yes  -LB      How has patient tried to help current problem?  massage, chiropractor  -LB      What clinical tests have you had for this problem?  X-ray  -LB      Results of Clinical Tests  scoliosis  -LB      History of Previous Related Injuries  chronic LBP  -LB         Pain     Pain Location  Back  -LB      Pain at Present  4  -LB      Pain at Best  4  -LB      Pain at Worst  6  -LB      Pain Frequency  Intermittent  -LB      Pain Description  Stabbing;Tightness  -LB      What Performance Factors Make the Current Problem(s) WORSE?  moving  in bed, getting out of bed, sitting for long period of time  -LB      What Performance Factors Make the Current Problem(s) BETTER?  FF position to stretch  -LB      Pain Comments  I go on my back to get out of bed.  -LB      Tolerance Time- Standing  15 minutes  -LB      Tolerance Time- Sitting  30 minutes  -LB      Tolerance Time- Walking  15 minutes  -LB      Tolerance Time- Lying  pain with transitions  -LB      Is your sleep disturbed?  Yes  -LB      What position do you sleep in?  Supine;Left sidelying;Right sidelying  -LB      Difficulties at work?  Pt does not work.   -LB      Difficulties with ADL's?  Pt walks [place]  -LB      Difficulties with recreational activities?  Pt does chair classes, yoga, Arturo Chi  -LB         Fall Risk Assessment    Any falls in the past year:  No  -LB         Services    Prior Rehab/Home Health Experiences  No  -LB      Are you currently receiving Home Health services  No  -LB      Do you plan to receive Home Health services in the near future  No  -LB         Daily Activities    Primary Language  English  -LB      Pt Participated in POC and Goals  Yes  -LB         Safety    Are you being hurt, hit, or frightened by anyone at home or in your life?  No  -LB      Are you being neglected by a caregiver  No  -LB        User Key  (r) = Recorded By, (t) = Taken By, (c) = Cosigned By    Initials Name Provider Type    Rosalinda Cooney PT Physical Therapist          PT Ortho     Row Name 08/15/19 1300       Subjective Comments    Subjective Comments  I am better with movement.  -LB       Precautions and Contraindications    Precautions/Limitations  fall precautions  -LB       Subjective Pain    Able to rate subjective pain?  yes  -LB    Pre-Treatment Pain Level  3  -LB       Posture/Observations    Posture/Observations Comments  FF posture, severely inc thoracic kypohosis, scoiliotic curve, dec lumbar lordosis  -LB       DTR- Lower Quarter Clearing     Patellar tendon (L2-4)  Bilateral:;1- Minimal response  -LB       Sensory Screen for Light Touch- Lower Quarter Clearing    L2 (anterior mid thigh)  Intact  -LB    L3 (distal anterior thigh)  Intact  -LB    L4 (medial lower leg/foot)  Intact  -LB    L5 (lateral lower leg/great toe)  Intact  -LB    S1 (bottom of foot)  Intact  -LB       Myotomal Screen- Lower Quarter Clearing    Hip flexion (L2)  Bilateral:;4 (Good)  -LB    Knee extension (L3)  Right:;5 (Normal);Left:;4 (Good)  -LB    Ankle DF (L4)  Bilateral:;5 (Normal)  -LB    Knee flexion (S2)  Bilateral:;5 (Normal)  -LB       Lumbar ROM Screen- Lower Quarter Clearing    Lumbar Flexion  Impaired inc thoracic kyphosis; 50% imaired  -LB    Lumbar Extension  Impaired to neutral; pain  -LB    Lumbar Lateral Flexion  Impaired 80% impaired B  -LB    Lumbar Rotation  Impaired 75% impaired B  -LB       Lumbar/SI Special Tests    Slump Test (Neural Tension)  Negative  -LB    SLR (Neural Tension)  Negative  -LB    REFUGIO (hip vs. SI Dysfunction)  Negative  -LB       Lumbosacral Palpation    Piriformis  Bilateral:;Guarded/taut;Tender  -LB    Gluteus Dante  Bilateral:;Tender;Guarded/taut  -LB    Quadratus Lumborum  Bilateral:;Tender;Guarded/taut  -LB    Erector Spinae (Paraspinals)  Bilateral:;Tender;Guarded/taut  -LB       General ROM    GENERAL ROM COMMENTS  dec B hip AROM  -LB       MMT (Manual Muscle Testing)    General MMT Comments  hip abduction 4/5  -LB       Sensation    Sensation WNL?  WFL  -LB       Lower Extremity Flexibility    Hamstrings  Bilateral:;Moderately limited  -LB    Hip External Rotators  Bilateral:;Moderately limited  -LB    Hip Internal Rotators  Bilateral:;Mildly limited  -LB    Gastrocnemius  Bilateral:;Mildly limited  -LB       Gait/Stairs Assessment/Training    [place] Level (Gait)  conditional independence  -LB    Assistive Device (Gait)  walker, front-wheeled  -LB    Distance in Feet (Gait)  50  -LB    Pattern (Gait)  step-to  -LB     Deviations/Abnormal Patterns (Gait)  base of support, wide;jeremi decreased;gait speed decreased;stride length decreased  -LB    Bilateral Gait Deviations  forward flexed posture;heel strike decreased  -LB      User Key  (r) = Recorded By, (t) = Taken By, (c) = Cosigned By    Initials Name Provider Type    Rosalinda Cooney, PT Physical Therapist                      Therapy Education  Education Details: issued HEP, discussed log roll, decompression position  Given: HEP, Symptoms/condition management, Pain management, Mobility training  Program: New  How Provided: Verbal, Demonstration, Written  Provided to: Patient  Level of Understanding: Teach back education performed, Verbalized, Demonstrated     PT OP Goals     Row Name 08/15/19 1500          PT Short Term Goals    STG Date to Achieve  08/29/19  -LB     STG 1  Pt will demonstrate understanding and compliance with intial HEP.  -LB     STG 1 Progress  New  -LB     STG 2  Pt will demonstrate log roll appropriately in/out of bed to reduce pain in AM.  -LB     STG 2 Progress  New  -LB     STG 3  Pt will report 50% reduced pain with initial sit EOB when performing HEP.  -LB     STG 3 Progress  New  -LB        Long Term Goals    LTG Date to Achieve  09/14/19  -LB     LTG 1  Pt will report pain at worse dec from 6/10 to 4/10 or better.  -LB     LTG 1 Progress  New  -LB     LTG 2  Pt will demonstrate TA activation in HL position with appropriate breathing to allow improved lumbar stability.  -LB     LTG 2 Progress  New  -LB     LTG 3  Pt will report reduced overall disability evidenced by reduced Oswestry disabiliyt score from 46% to 30% or less.  -LB     LTG 3 Progress  New  -LB        Time Calculation    PT Goal Re-Cert Due Date  11/13/19  -LB       User Key  (r) = Recorded By, (t) = Taken By, (c) = Cosigned By    Initials Name Provider Type    Rosalinda Cooney, PT Physical Therapist          PT Assessment/Plan     Row Name 08/15/19 1509          PT Assessment     Functional Limitations  Performance in leisure activities;Performance in self-care ADL;Limitations in community activities;Impaired locomotion;Limitations in functional capacity and performance  -LB     Impairments  Impaired flexibility;Locomotion;Poor body mechanics;Posture;Range of motion;Pain;Joint mobility;Impaired postural alignment;Gait;Endurance;Muscle strength  -LB     Assessment Comments  Pt is 84 y.o. female referred to outpatient physical therapy for evaluation and treatment of  evolving  bilateral buttocks pain described as a stabbing/tightness that began 3 months ago insidiously.  Patient presents with postural dysfunction, inc thoracic kyphosis, dec lumbar lordosis, guarding and muscle tightness in B glutes, extensors, QL, dec core strength. PMHx consistent with paralysis of R lung limiting tolerance to fully supine position, chronic LBP, scoliosis. Personal factors affecting her care include yoga/Arturo Chi classes weekly, use of RW. Pt demonstrates signs and symptoms  consistent with postural dysfunction and muscle spasm in B glutes/lumbar extensors.  Pt scored 46% disability on the Modified Oswestry. Pt is limited in their ability to participate in getting in/out of bed, maintaining upright posture. She will benefit from continued skilled PT services to address functional deficits. Thank you for this referral.  -LB     Please refer to paper survey for additional self-reported information  Yes  -LB     Rehab Potential  Good  -LB     Patient/caregiver participated in establishment of treatment plan and goals  Yes  -LB     Patient would benefit from skilled therapy intervention  Yes  -LB        PT Plan    PT Frequency  2x/week  -LB     Predicted Duration of Therapy Intervention (Therapy Eval)  4 weesk   -LB     Planned CPT's?  PT EVAL MOD COMPLELITY: 03144;PT THER PROC EA 15 MIN: 60702;PT RE-EVAL: 85372;PT MANUAL THERAPY EA 15 MIN: 34719;PT HOT OR COLD PACK TREAT MCARE;PT HOT/COLD PACK WC NONMCARE:  03592;PT ULTRASOUND EA 15 MIN: 13574;PT THER ACT EA 15 MIN: 66468;PT TRACTION LUMBAR: 31853;PT ELECTRICAL STIM UNATTEND: ;PT NEUROMUSC RE-EDUCATION EA 15 MIN: 64175  -LB     PT Plan Comments  Assess tolerance to HEP, review log roll and assess compliance, consider Nustep warm up, stair HS stretch, wall wash for posture, shoulder row, review TA, hip abduction HL, consider lumbar traction vs. manual to address glute/lumbar extensor tightness.   -LB       User Key  (r) = Recorded By, (t) = Taken By, (c) = Cosigned By    Initials Name Provider Type    LB Rosalinda Lawrence, PT Physical Therapist            Exercises     Row Name 08/15/19 1300             Subjective Comments    Subjective Comments  I am better with movement.  -LB         Subjective Pain    Able to rate subjective pain?  yes  -LB      Pre-Treatment Pain Level  3  -LB         Total Minutes    30137 - PT Therapeutic Exercise Minutes  15  -LB         Exercise 1    Exercise Name 1  LTR  -LB      Reps 1  10  -LB         Exercise 2    Exercise Name 2  SKTC  -LB      Reps 2  3  -LB      Time 2  20  -LB         Exercise 3    Exercise Name 3  hip adduction isometric  -LB      Reps 3  10  -LB      Time 3  5  -LB         Exercise 4    Exercise Name 4  PPT  -LB      Reps 4  10  -LB      Time 4  5  -LB         Exercise 5    Exercise Name 5  log roll  -LB        User Key  (r) = Recorded By, (t) = Taken By, (c) = Cosigned By    Initials Name Provider Type    LB Rosalinda Lawrence, PT Physical Therapist                        Outcome Measure Options: Modifed Owestry  Modified Oswestry  Modified Oswestry Score/Comments: 46% disability       Time Calculation:     Start Time: 1315  Stop Time: 1400  Time Calculation (min): 45 min  Total Timed Code Minutes- PT: 15 minute(s)     Therapy Charges for Today     Code Description Service Date Service Provider Modifiers Qty    29975176480 HC PT THER PROC EA 15 MIN 8/15/2019 Rosalinda Lawrence, PT GP 1    [id number] HC PT EVAL MOD COMPLEXITY  2 8/15/2019 Rosalinda Lawrence, PT GP 1          PT G-Codes  Outcome Measure Options: Modifed [place]  Modified Oswestry Score/Comments: 46% disability          Rosalinda Lawrence, PT  8/15/2019

## 2019-08-20 RX ORDER — WARFARIN SODIUM 4 MG/1
TABLET ORAL
Qty: 90 TABLET | Refills: 0 | Status: SHIPPED | OUTPATIENT
Start: 2019-08-20 | End: 2019-09-16

## 2019-08-21 ENCOUNTER — TELEPHONE (OUTPATIENT)
Dept: CARDIOLOGY | Facility: CLINIC | Age: 84
End: 2019-08-21

## 2019-08-21 NOTE — TELEPHONE ENCOUNTER
Patient has a terrible cough.  She has a hx of CHF.  I have given her an appt for tomorrow at 2:00 with Darling Rodney./ KOBI

## 2019-08-22 ENCOUNTER — OFFICE VISIT (OUTPATIENT)
Dept: CARDIOLOGY | Facility: CLINIC | Age: 84
End: 2019-08-22

## 2019-08-22 VITALS
HEIGHT: 64 IN | SYSTOLIC BLOOD PRESSURE: 116 MMHG | HEART RATE: 87 BPM | WEIGHT: 221.4 LBS | BODY MASS INDEX: 37.8 KG/M2 | DIASTOLIC BLOOD PRESSURE: 76 MMHG | RESPIRATION RATE: 18 BRPM

## 2019-08-22 DIAGNOSIS — I25.10 CORONARY ARTERY DISEASE INVOLVING NATIVE CORONARY ARTERY OF NATIVE HEART WITHOUT ANGINA PECTORIS: Primary | ICD-10-CM

## 2019-08-22 DIAGNOSIS — I44.2 THIRD DEGREE AV BLOCK (HCC): ICD-10-CM

## 2019-08-22 DIAGNOSIS — I50.32 CHRONIC DIASTOLIC HEART FAILURE (HCC): ICD-10-CM

## 2019-08-22 DIAGNOSIS — I63.032 CEREBROVASCULAR ACCIDENT (CVA) DUE TO THROMBOSIS OF LEFT CAROTID ARTERY (HCC): ICD-10-CM

## 2019-08-22 DIAGNOSIS — R05.3 COUGH, PERSISTENT: ICD-10-CM

## 2019-08-22 DIAGNOSIS — Z95.0 PACEMAKER: ICD-10-CM

## 2019-08-22 DIAGNOSIS — I48.0 PAROXYSMAL ATRIAL FIBRILLATION (HCC): ICD-10-CM

## 2019-08-22 DIAGNOSIS — E11.59 TYPE 2 DIABETES MELLITUS WITH OTHER CIRCULATORY COMPLICATION, WITHOUT LONG-TERM CURRENT USE OF INSULIN (HCC): ICD-10-CM

## 2019-08-22 PROCEDURE — 99214 OFFICE O/P EST MOD 30 MIN: CPT | Performed by: NURSE PRACTITIONER

## 2019-08-22 PROCEDURE — 93000 ELECTROCARDIOGRAM COMPLETE: CPT | Performed by: NURSE PRACTITIONER

## 2019-08-22 RX ORDER — PANTOPRAZOLE SODIUM 40 MG/1
40 TABLET, DELAYED RELEASE ORAL 2 TIMES DAILY
Qty: 60 TABLET | Refills: 1 | Status: SHIPPED | OUTPATIENT
Start: 2019-08-22 | End: 2020-01-07 | Stop reason: ALTCHOICE

## 2019-08-22 NOTE — PROGRESS NOTES
Date of Office Visit: 2019  Encounter Provider: KRISTIN Frey  Place of Service: [facility] CARDIOLOGY  Patient Name: Caitlyn Longoria  :1934    Chief Complaint   Patient presents with   • Follow-up   • Coronary Artery Disease   • Atrial Fibrillation   • Cough   :     HPI: Caitlyn Longoria is a 84 y.o. female  with history of hypertension, hyperlipidemia, atrial fibrillation, coronary artery disease with history of stent, CVA, oropharyngeal dysphagia,chronic systolic and diastolic congestive heart failure, chronic kidney disease stage III, and diabetes mellitus.     In 2007 it appeared that she had an anterior infarct.  She had an echocardiogram that confirmed this and ultimately had cardiac catheterization on 2008, which confirmed a left ventricular ejection fraction of 35%, total occlusion of the mid left anterior descending, severe disease of the large diagonal, and insignificant disease of the right coronary artery and circumflex.  She had angioplasty and drug-eluting stent placement of the diagonal.  Unfortunately, she developed a right femoral pseudoaneurysm and had to have that surgically repaired.  Follow-up echocardiogram in  showed left ventricular function have returned to normal.     In 2011 she presented with increasing shortness of breath and was found to have some congestive heart failure, atrial flutter, and marked bradycardia.  Echocardiogram at that time showed preserved LV function.  He had a dual-chamber pacemaker implanted.  She underwent atrial flutter ablation in 2011.     In 2012 she presented with dyspnea area she underwent cardiac catheterization which showed elevated right-sided pressures with a PA systolic pressure of 53mmHg, mean 31, and elevated wedge at 26.  She hadin-stent stenosis of the diagonal vessel.  The total occlusion of the mid LAD was unchanged.  The EF was approximately 50% and she  underwent angioplasty and drug-eluting stent to the diagonal vessel, again.  Her medications were adjusted and then she developed dizziness so her Lasix was cut back.  She had a couple episodes of passing out that were attributed to low blood pressure     She then had the RV lead revised in October 2014.     In November 2016 she had some shortness of breath.  She had a perfusion stress test that showed a very small area of ischemia in the septal wall and an echocardiogram that revealed a left ventricular ejection fraction of approximately 45%significant valvular disease.  Turned out, she had reflux with aspiration and needed treatment for that.     In October 2017 she had neuro changes.  CT of the head and neck show no evidence of acute infarction or hemorrhage.  She was noted to have small vessel ischemic disease and age appropriate atrophic she has significant dysarthria which resolved today prior to discharge and was started on Plavix.  She then had a conjunctival hemorrhage and was told to take aspirin 81 mg a remain on Coumadin instead.  Atherosclerotic disease involving the carotid bifurcation was noted bilaterally.  Echocardiogram in December 2017 showedseverely decreased left ventricular systolic function with calculated EF of 23.4%, grade 1 diastolic dysfunction, mild LVH, moderately thickened aortic valve, moderate MAC, and mild mitral valve regurgitation.  She was unable to have a MRI due to her pacer.  In January 2018, she had sepsis of the right knee joint. ID recommended 6 weeks of IV antibiotics and she was discharged with a PICC line.   She then developed lymphedema.     Repeat echo in 05/2018,  showed left ventricular ejection fraction had improved from 23 to 35% - 40%.    She later moved into an apartment community.  In August 2018 she had been taken off Bumex and put on Lasix due to increased kidney dysfunction.      In April 2019 she reported doing well despite a cough which she had had some of  the year.  Her weights have been stable between 223-226 at home.  Her blood pressure was stable she continued to take antibiotic per infectious disease for septic knee recurrent infection.  We continued her diuretic as she was euvolemic.  She presents today for reassessment since then has been evaluated by her pulmonologist and may had a chest x-ray for persistent cough with no diagnoses of pneumonia.  Her inhalers were changed and she is now on Breo.  She continues to wear CPAP nightly.  She has some occasional wheezing and productive cough which is clear.  She wonders if it is allergy related to reflux related.  She saw her gastroenterologist in approximately October last year and they agreed to stop some of her medication.  She denies chest pain tightness pressure, palpitation, edema, lightheadedness, near-syncope, or syncope.  She continues to weigh herself so weights have been stable.  Her legs look better than they have been over a year reportedly.  She denies orthopnea or PND.  She walks 1-2 miles and does some yoga exercises at the assisted living facility and tolerates those exercises.  She also had a swallow test earlier this month which showed that she has mild oropharyngeal dysphasia which is unchanged.      Allergies   Allergen Reactions   • Accupril [Quinapril Hcl] Delirium     Swelling, HA,, confusion and constipation per pt   • Ahist [Chlorpheniramine] Nausea Only, Other (See Comments) and Dizziness     Headache, Blurred vision   • Chlorcyclizine Unknown (See Comments)   • Clarithromycin Nausea Only     Other reaction(s): Headache, Depression, Flushed   • Diclofenac Sodium Unknown (See Comments)   • Diphenhydramine      Benadryl   • Esomeprazole GI Intolerance     Nexium. Stomach issues   • Ibuprofen Other (See Comments)     Does not recall    • Levalbuterol Swelling     Xopenex   • Levocetirizine Other (See Comments)     Xyzal. Diarrhea, Stomach cramps   • Lipitor [Atorvastatin] Other (See Comments)  "    Muscle pain and weakness, dark urine   • Lodine [Etodolac]    • Omeprazole Nausea Only     Prilosec. Headache   • Pravachol [Pravastatin] Nausea Only and Other (See Comments)     Bloated, Constipation, Headaches   • Quinapril Swelling and Confusion     Accupril. Headaches, constipation   • Sulfa Antibiotics    • Sulindac Myalgia     Other reaction(s): Headache, joint pain, bruising   • Valdecoxib Irritability   • Prednisone Rash       Past Medical History:   Diagnosis Date   • Aortic calcification (CMS/HCC)     mild, on echo 12/17/2017   • Aortic regurgitation     Trace   • CAD (coronary artery disease)    • Chronic combined systolic and diastolic congestive heart failure (CMS/HCC)    • CKD (chronic kidney disease) stage 3, GFR 30-59 ml/min (CMS/[facility])    • Coronary artery disease involving native coronary artery of native heart with angina pectoris (CMS/HCC)    • DM type 2 (diabetes mellitus, type 2) (CMS/[facility])    • Hypertension    • Mild mitral regurgitation    • Mitral annular calcification     12/8/2017- echo, moderate   • PAF (paroxysmal atrial fibrillation) (CMS/[facility])    • SSS (sick sinus syndrome) (CMS/[facility])    • Tricuspid regurgitation     Trace       Past Surgical History:   Procedure Laterality Date   • CARDIAC CATHETERIZATION     • CHOLECYSTECTOMY     • CORONARY STENT PLACEMENT     • HERNIA REPAIR     • HYSTERECTOMY     • PACEMAKER IMPLANTATION     • REPLACEMENT TOTAL KNEE           Family and social history reviewed.     Review of Systems   Constitution: Positive for malaise/fatigue.   Cardiovascular: Positive for dyspnea on exertion.     All other systems were reviewed and are negative          Objective:     Vitals:    08/22/19 1408   BP: 116/76   BP Location: Left arm   Patient Position: Sitting   Cuff Size: Adult   Pulse: 87   Resp: 18   Weight: 100 kg (221 lb 6.4 oz)   Height: 162.6 cm (64\")     Body mass index is 38 kg/m².    PHYSICAL EXAM:  Physical Exam   Constitutional: She is oriented to person, " place, and time. She appears well-developed and well-nourished. No distress.   HENT:   Head: Normocephalic.   Eyes: Conjunctivae are normal.   Neck: Normal range of motion. No JVD present.   Cardiovascular: Normal rate, regular rhythm and intact distal pulses.   Murmur heard.   Systolic murmur is present with a grade of 2/6 at the upper right sternal border.  Pulses:       Carotid pulses are 2+ on the right side, and 2+ on the left side.       Radial pulses are 2+ on the right side, and 2+ on the left side.        Posterior tibial pulses are 2+ on the right side, and 2+ on the left side.   Pulmonary/Chest: Effort normal and breath sounds normal. No respiratory distress. She has no wheezes. She has no rhonchi. She has no rales. She exhibits no tenderness.   Abdominal: Soft. Bowel sounds are normal. She exhibits no distension.   Musculoskeletal: Normal range of motion. She exhibits no edema.   Neurological: She is alert and oriented to person, place, and time.   Skin: Skin is warm, dry and intact. No rash noted. She is not diaphoretic. No cyanosis.   Psychiatric: She has a normal mood and affect. Her behavior is normal. Judgment and thought content normal.         ECG 12 Lead  Date/Time: 8/22/2019 4:07 PM  Performed by: Darling Rodney APRN  Authorized by: Darling Rodney APRN   Comparison: compared with previous ECG from 10/19/2018  Similar to previous ECG  Rhythm: paced    Clinical impression: abnormal EKG  Comments: AV paced            Current Outpatient Medications   Medication Sig Dispense Refill   • acetaminophen (TYLENOL) 500 MG tablet Take 500 mg by mouth Every 6 (Six) Hours As Needed for Mild Pain .     • ACETYLCYSTEINE PO Take 1,000 mg by mouth Daily.     • Albuterol (VENTOLIN IN) Inhale 3 (Three) Times a Day.     • amoxicillin (AMOXIL) 500 MG capsule Take 4 capsule one hour prior to dental procedure 4 capsule 0   • ASPIRIN 81 PO Take 81 mg by mouth Daily.     • azelastine (OPTIVAR) 0.05 % ophthalmic solution  Administer 1 drop to both eyes 2 (Two) Times a Day.     • BREO ELLIPTA 100-25 MCG/INH inhaler Inhale 200 puffs.     • Calcium Carbonate-Vitamin D (CALCIUM-VITAMIN D) 500-200 MG-UNIT tablet per tablet TAKE TWO TABLETS BY MOUTH DAILY     • carvedilol (COREG) 3.125 MG tablet Take 1 tablet by mouth 2 (Two) Times a Day. 180 tablet 3   • cefadroxil (DURICEF) 500 MG capsule Take 500 mg by mouth 2 (Two) Times a Day.     • cetirizine (ZyrTEC) 10 MG tablet Take 10 mg by mouth Daily.     • clobetasol (TEMOVATE) 0.05 % external solution      • clotrimazole-betamethasone (LOTRISONE) 1-0.05 % cream Apply 1 application topically 2 (Two) Times a Day As Needed.     • dry mouth gel (BIOTENE ORALBALANCE) gel Apply  to the mouth or throat As Needed for Dry Mouth.     • fluticasone (FLONASE) 50 MCG/ACT nasal spray 2 sprays into the nostril(s) as directed by provider Daily.     • furosemide (LASIX) 20 MG tablet Take 20 mg by mouth 2 (Two) Times a Day.     • glipiZIDE (GLUCOTROL) 5 MG tablet Take one half tablet by  Mouth every morning     • ketoconazole (NIZORAL) 2 % shampoo Apply  topically 2 (Two) Times a Week.     • loperamide (IMODIUM) 2 MG capsule Take 2 mg by mouth Every Other Day.     • losartan (COZAAR) 25 MG tablet TAKE ONE TABLET BY MOUTH DAILY 90 tablet 3   • Omega-3 Fatty Acids (FISH OIL) 1000 MG capsule capsule Take 1,000 mg by mouth 2 (Two) Times a Day With Meals.     • oxazepam (SERAX) 10 MG capsule Take 1 capsule by mouth Every Night. 5 capsule 0   • oxybutynin XL (DITROPAN-XL) 10 MG 24 hr tablet Take 10 mg by mouth Daily.     • rosuvastatin (CRESTOR) 20 MG tablet Take 20 mg by mouth Every Night.     • vitamin B-12 (CYANOCOBALAMIN) 1000 MCG tablet Take 1 tablet by mouth Daily. 30 tablet 12   • warfarin (COUMADIN) 4 MG tablet TAKE ONE TABLET BY MOUTH DAILY 90 tablet 0   • pantoprazole (PROTONIX) 40 MG EC tablet Take 1 tablet by mouth 2 (Two) Times a Day. 60 tablet 1     No current facility-administered medications for this  visit.      Assessment:       Diagnosis Plan   1. Coronary artery disease involving native coronary artery of native heart without angina pectoris     2. Paroxysmal atrial fibrillation (CMS/HCC)     3. Type 2 diabetes mellitus with other circulatory complication, without long-term current use of insulin (CMS/[facility])     4. Cerebrovascular accident (CVA) due to thrombosis of left carotid artery (CMS/[facility])     5. Third degree AV block (CMS/[facility])     6. Pacemaker     7. Chronic diastolic heart failure (CMS/[facility])     8. Cough, persistent          No orders of the defined types were placed in this encounter.        Plan:        1.  History of coronary disease involving the mid left anterior descending with total occlusion of that.  First diagonal vessel with severe disease status post angioplasty and drug-eluting stent placement.  Follow-up In October 2012 showed restenosis of the diagonal vessel and repeat angioplasty and drug-eluting stent placement was completed.  EF was approximately 50% with segmental wall motion abnormality.  Echocardiogram in December 2017 was 23.4% she was started on carvedilol and losartan.  Echocardiogram May 2018 showed improvement to 35-40%. No angina, no heart failure.  Coronary Artery Disease  Assessment  • The patient has no angina  • On warfarin    Plan  • Lifestyle modifications discussed include adhering to a heart healthy diet, avoidance of tobacco products, maintenance of a healthy weight, medication compliance, regular exercise and regular monitoring of cholesterol and blood pressure    Subjective - Objective  • There is a history of past MI  • There has been a previous stent procedure using LOLA  • Current antiplatelet therapy includes aspirin 81 mg          2.  Atrial fibrillation/sick sinus syndrome status post permanent pacemaker implantation status post flutter ablation status post RV lead revision.  No bleeding issues on warfarin following in our anticoagulation clinic  Atrial  Fibrillation and Atrial Flutter  Assessment  • The patient has persistent atrial fibrillation  • This is non-valvular in etiology  • The patient's CHADS2-VASc score is 9  • A [id number] score of 2 or more is considered a high risk for a thromboembolic event  • Warfarin prescribed    Plan  • Continue in atrial fibrillation with rate control  • Continue warfarin for antithrombotic therapy, bleeding issues discussed  • Continue beta blocker for rhythm control    3.  History of CVA in December 2000 likely embolic from atrial fibrillation she was intolerant to Plavix. She continues on ASA  4.  Diabetes mellitus hemoglobin A1c goal 7.0 or less  5.  Hyperlipidemia on target dose rosuvastatin  6.  Obstructive sleep apnea compliance on CPAP followed by Dr. Coburn  7.  History of pulmonary hypertension   8.  Morbid obesity BMI 44.4-she attempts to make healthier food choices but that is hard at the living facility.  She does participate in some yoga exercises and walks for 1-2 miles there.  9. History of syncope secondary to hypotension no recurrence.  No dizziness or lightheadedness.  10.  Ischemic cardiomyopathy left ventricular ejection fraction improved from 23-35-40% in 05/2018.    She appears stable, weights have been stable, no orthopnea, PND, increased dyspnea, or increased lower extremity edema.    Is stable on current dose of furosemide her home weights are also stable.  She is to call with issues  11.  Chronic kidney disease on lasix and volume status appears stable. Switched off Bumex in 2018 for worsening renal function  12.  Bilateral carotid plaque without stenosis on duplex and 12/2017  13. 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  Oropharyngeal dysphasia mild on video swallow August 2019 unchanged from 2016  15 chronic cough persistent over 8 months.  She has a history of GERD and feels as though symptoms have been worse since coming off her medication back in  October.  I have resumed Protonix twice daily and will call her in a month to see if her symptoms have improved.  I will obtain last office visit from her gastroenterologist, Dr. Cota to see what medications they stopped    Follow up in 3 months       It has been a pleasure to participate in this patient's care.      Thank you,  KRISTIN Frey      **[employer] Disclaimer:**  Much of this encounter note is an electronic transcription/translation of spoken language to printed text. The electronic translation of spoken language may permit erroneous, or at times, nonsensical words or phrases to be inadvertently transcribed. Although I have reviewed the note for such errors, some may still exist.

## 2019-08-23 ENCOUNTER — APPOINTMENT (OUTPATIENT)
Dept: PHYSICAL THERAPY | Facility: HOSPITAL | Age: 84
End: 2019-08-23

## 2019-08-28 ENCOUNTER — ANTICOAGULATION VISIT (OUTPATIENT)
Dept: PHARMACY | Facility: HOSPITAL | Age: 84
End: 2019-08-28

## 2019-08-28 DIAGNOSIS — I48.0 PAF (PAROXYSMAL ATRIAL FIBRILLATION) (HCC): ICD-10-CM

## 2019-08-28 LAB
INR PPP: 2.6 (ref 0.91–1.09)
PROTHROMBIN TIME: 31.5 SECONDS (ref 10–13.8)

## 2019-08-28 PROCEDURE — 85610 PROTHROMBIN TIME: CPT

## 2019-08-28 PROCEDURE — 36416 COLLJ CAPILLARY BLOOD SPEC: CPT

## 2019-08-28 NOTE — PROGRESS NOTES
Anticoagulation Clinic Progress Note    Anticoagulation Summary  As of 2019    INR goal:   2.0-3.0   TTR:   85.0 % (11.4 mo)   INR used for dosin.6 (2019)   Warfarin maintenance plan:   2 mg every Tue, Sat; 4 mg all other days   Weekly warfarin total:   24 mg   No change documented:   Pao Levi RPH   Plan last modified:   Pao Levi RPH (5/15/2019)   Next INR check:   10/2/2019   Priority:   Maintenance   Target end date:   Indefinite    Indications    PAF (paroxysmal atrial fibrillation) (CMS/[facility]) [I48.0]             Anticoagulation Episode Summary     INR check location:       Preferred lab:       Send INR reminders to:   [facility]    Comments:         Anticoagulation Care Providers     Provider Role Specialty Phone number    Rommel Veliz MD Referring Cardiology [phone]          Clinic Interview:      INR History:  Anticoagulation Monitoring 7/3/2019 2019 2019   INR 2.7 2.3 2.6   INR Date 7/3/2019 2019 2019   INR Goal 2.0-3.0 2.0-3.0 2.0-3.0   Trend Same Same Same   Last Week Total 24 mg 24 mg 24 mg   Next Week Total 24 mg 24 mg 24 mg   Sun 4 mg 4 mg 4 mg   Mon 4 mg 4 mg 4 mg   Tue 2 mg 2 mg 2 mg   Wed 4 mg 4 mg 4 mg   Thu 4 mg 4 mg 4 mg   Fri 4 mg 4 mg 4 mg   Sat 2 mg 2 mg 2 mg   Visit Report - - -   Some recent data might be hidden       Plan:  1. INR is Therapeutic today- see above in Anticoagulation Summary.  Will instruct Caitlyn Longoria to Continue their warfarin regimen- see above in Anticoagulation Summary.  2. Follow up in 1 month  3. Patient declines warfarin refills.  4. Verbal and written information provided. Patient expresses understanding and has no further questions at this time.    Pao Levi RPH

## 2019-08-30 ENCOUNTER — APPOINTMENT (OUTPATIENT)
Dept: PHYSICAL THERAPY | Facility: HOSPITAL | Age: 84
End: 2019-08-30

## 2019-09-06 ENCOUNTER — APPOINTMENT (OUTPATIENT)
Dept: PHYSICAL THERAPY | Facility: HOSPITAL | Age: 84
End: 2019-09-06

## 2019-09-10 ENCOUNTER — APPOINTMENT (OUTPATIENT)
Dept: PHYSICAL THERAPY | Facility: HOSPITAL | Age: 84
End: 2019-09-10

## 2019-09-13 ENCOUNTER — APPOINTMENT (OUTPATIENT)
Dept: PHYSICAL THERAPY | Facility: HOSPITAL | Age: 84
End: 2019-09-13

## 2019-09-16 ENCOUNTER — HOSPITAL ENCOUNTER (INPATIENT)
Facility: HOSPITAL | Age: 84
LOS: 4 days | Discharge: HOME | End: 2019-09-20
Attending: EMERGENCY MEDICINE | Admitting: INTERNAL MEDICINE

## 2019-09-16 ENCOUNTER — APPOINTMENT (OUTPATIENT)
Dept: GENERAL RADIOLOGY | Facility: HOSPITAL | Age: 84
End: 2019-09-16

## 2019-09-16 ENCOUNTER — APPOINTMENT (OUTPATIENT)
Dept: CT IMAGING | Facility: HOSPITAL | Age: 84
End: 2019-09-16

## 2019-09-16 DIAGNOSIS — J96.01 ACUTE RESPIRATORY FAILURE WITH HYPOXIA (HCC): Primary | ICD-10-CM

## 2019-09-16 LAB
ALBUMIN SERPL-MCNC: 4.4 G/DL (ref 3.5–5.2)
ALBUMIN/GLOB SERPL: 1.7 G/DL
ALP SERPL-CCNC: 81 U/L (ref 39–117)
ALT SERPL W P-5'-P-CCNC: 19 U/L (ref 1–33)
ANION GAP SERPL CALCULATED.3IONS-SCNC: 15.4 MMOL/L (ref 5–15)
ANISOCYTOSIS BLD QL: ABNORMAL
ARTERIAL PATENCY WRIST A: POSITIVE
AST SERPL-CCNC: 25 U/L (ref 1–32)
ATMOSPHERIC PRESS: 749.8 MMHG
BASE EXCESS BLDA CALC-SCNC: 0.6 MMOL/L (ref 0–2)
BDY SITE: ABNORMAL
BILIRUB SERPL-MCNC: 0.8 MG/DL (ref 0.2–1.2)
BUN BLD-MCNC: 22 MG/DL (ref 8–23)
BUN/CREAT SERPL: 19.6 (ref 7–25)
CALCIUM SPEC-SCNC: 9 MG/DL (ref 8.6–10.5)
CHLORIDE SERPL-SCNC: 102 MMOL/L (ref 98–107)
CO2 SERPL-SCNC: 24.6 MMOL/L (ref 22–29)
CREAT BLD-MCNC: 1.12 MG/DL (ref 0.57–1)
D-LACTATE SERPL-SCNC: 1.2 MMOL/L (ref 0.5–2)
DEPRECATED RDW RBC AUTO: 53.1 FL (ref 37–54)
EOSINOPHIL # BLD MANUAL: 0.13 10*3/MM3 (ref 0–0.4)
EOSINOPHIL NFR BLD MANUAL: 1 % (ref 0.3–6.2)
ERYTHROCYTE [DISTWIDTH] IN BLOOD BY AUTOMATED COUNT: 14.6 % (ref 12.3–15.4)
GFR SERPL CREATININE-BSD FRML MDRD: 46 ML/MIN/1.73
GLOBULIN UR ELPH-MCNC: 2.6 GM/DL
GLUCOSE BLD-MCNC: 92 MG/DL (ref 65–99)
HCO3 BLDA-SCNC: 23.1 MMOL/L (ref 22–28)
HCT VFR BLD AUTO: 42.8 % (ref 34–46.6)
HGB BLD-MCNC: 13.9 G/DL (ref 12–15.9)
HOLD SPECIMEN: NORMAL
HOLD SPECIMEN: NORMAL
INR PPP: 2.5 (ref 0.9–1.1)
INR PPP: 2.64 (ref 0.9–1.1)
LYMPHOCYTES # BLD MANUAL: 7.44 10*3/MM3 (ref 0.7–3.1)
LYMPHOCYTES NFR BLD MANUAL: 3 % (ref 5–12)
LYMPHOCYTES NFR BLD MANUAL: 57.6 % (ref 19.6–45.3)
MCH RBC QN AUTO: 31.9 PG (ref 26.6–33)
MCHC RBC AUTO-ENTMCNC: 32.5 G/DL (ref 31.5–35.7)
MCV RBC AUTO: 98.2 FL (ref 79–97)
MODALITY: ABNORMAL
MONOCYTES # BLD AUTO: 0.39 10*3/MM3 (ref 0.1–0.9)
NEUTROPHILS # BLD AUTO: 4.96 10*3/MM3 (ref 1.7–7)
NEUTROPHILS NFR BLD MANUAL: 38.4 % (ref 42.7–76)
NT-PROBNP SERPL-MCNC: 291.9 PG/ML (ref 5–1800)
PCO2 BLDA: 30 MM HG (ref 35–45)
PH BLDA: 7.5 PH UNITS (ref 7.35–7.45)
PLAT MORPH BLD: NORMAL
PLATELET # BLD AUTO: 165 10*3/MM3 (ref 140–450)
PMV BLD AUTO: 9.8 FL (ref 6–12)
PO2 BLDA: 53.5 MM HG (ref 80–100)
POLYCHROMASIA BLD QL SMEAR: ABNORMAL
POTASSIUM BLD-SCNC: 3.7 MMOL/L (ref 3.5–5.2)
PROT SERPL-MCNC: 7 G/DL (ref 6–8.5)
PROTHROMBIN TIME: 26.7 SECONDS (ref 11.7–14.2)
PROTHROMBIN TIME: 27.9 SECONDS (ref 11.7–14.2)
RBC # BLD AUTO: 4.36 10*6/MM3 (ref 3.77–5.28)
SAO2 % BLDCOA: 90.5 % (ref 92–99)
SCHISTOCYTES BLD QL SMEAR: ABNORMAL
SMUDGE CELLS BLD QL SMEAR: ABNORMAL
SODIUM BLD-SCNC: 142 MMOL/L (ref 136–145)
TOTAL RATE: 24 BREATHS/MINUTE
TROPONIN T SERPL-MCNC: <0.01 NG/ML (ref 0–0.03)
WBC NRBC COR # BLD: 12.92 10*3/MM3 (ref 3.4–10.8)
WHOLE BLOOD HOLD SPECIMEN: NORMAL
WHOLE BLOOD HOLD SPECIMEN: NORMAL

## 2019-09-16 PROCEDURE — 80053 COMPREHEN METABOLIC PANEL: CPT | Performed by: EMERGENCY MEDICINE

## 2019-09-16 PROCEDURE — 94799 UNLISTED PULMONARY SVC/PX: CPT

## 2019-09-16 PROCEDURE — 85610 PROTHROMBIN TIME: CPT | Performed by: INTERNAL MEDICINE

## 2019-09-16 PROCEDURE — 83605 ASSAY OF LACTIC ACID: CPT | Performed by: EMERGENCY MEDICINE

## 2019-09-16 PROCEDURE — 71275 CT ANGIOGRAPHY CHEST: CPT

## 2019-09-16 PROCEDURE — 99285 EMERGENCY DEPT VISIT HI MDM: CPT

## 2019-09-16 PROCEDURE — 85025 COMPLETE CBC W/AUTO DIFF WBC: CPT | Performed by: EMERGENCY MEDICINE

## 2019-09-16 PROCEDURE — 0 IOPAMIDOL PER 1 ML: Performed by: INTERNAL MEDICINE

## 2019-09-16 PROCEDURE — 85610 PROTHROMBIN TIME: CPT | Performed by: EMERGENCY MEDICINE

## 2019-09-16 PROCEDURE — 82803 BLOOD GASES ANY COMBINATION: CPT

## 2019-09-16 PROCEDURE — 83880 ASSAY OF NATRIURETIC PEPTIDE: CPT | Performed by: EMERGENCY MEDICINE

## 2019-09-16 PROCEDURE — 84484 ASSAY OF TROPONIN QUANT: CPT | Performed by: EMERGENCY MEDICINE

## 2019-09-16 PROCEDURE — 85007 BL SMEAR W/DIFF WBC COUNT: CPT | Performed by: EMERGENCY MEDICINE

## 2019-09-16 PROCEDURE — 93010 ELECTROCARDIOGRAM REPORT: CPT | Performed by: INTERNAL MEDICINE

## 2019-09-16 PROCEDURE — 94640 AIRWAY INHALATION TREATMENT: CPT

## 2019-09-16 PROCEDURE — 93005 ELECTROCARDIOGRAM TRACING: CPT | Performed by: EMERGENCY MEDICINE

## 2019-09-16 PROCEDURE — 71046 X-RAY EXAM CHEST 2 VIEWS: CPT

## 2019-09-16 PROCEDURE — 36600 WITHDRAWAL OF ARTERIAL BLOOD: CPT

## 2019-09-16 PROCEDURE — 87040 BLOOD CULTURE FOR BACTERIA: CPT | Performed by: EMERGENCY MEDICINE

## 2019-09-16 RX ORDER — CARVEDILOL 3.12 MG/1
3.12 TABLET ORAL 2 TIMES DAILY
Status: DISCONTINUED | OUTPATIENT
Start: 2019-09-16 | End: 2019-09-20 | Stop reason: HOSPADM

## 2019-09-16 RX ORDER — SENNOSIDES 8.6 MG
650 CAPSULE ORAL 2 TIMES DAILY
Status: ON HOLD | COMMUNITY
End: 2020-09-30

## 2019-09-16 RX ORDER — WARFARIN SODIUM 2 MG/1
2 TABLET ORAL 2 TIMES WEEKLY
Status: DISCONTINUED | OUTPATIENT
Start: 2019-09-17 | End: 2019-09-20 | Stop reason: DRUGHIGH

## 2019-09-16 RX ORDER — ASPIRIN 81 MG/1
81 TABLET ORAL DAILY
Status: DISCONTINUED | OUTPATIENT
Start: 2019-09-17 | End: 2019-09-20 | Stop reason: HOSPADM

## 2019-09-16 RX ORDER — WARFARIN SODIUM 4 MG/1
2 TABLET ORAL 2 TIMES WEEKLY
COMMUNITY
End: 2019-12-09 | Stop reason: SDUPTHER

## 2019-09-16 RX ORDER — WARFARIN SODIUM 4 MG/1
4 TABLET ORAL
Status: DISCONTINUED | OUTPATIENT
Start: 2019-09-16 | End: 2019-09-20 | Stop reason: DRUGHIGH

## 2019-09-16 RX ORDER — LEVOFLOXACIN 750 MG/1
750 TABLET ORAL DAILY
COMMUNITY
Start: 2019-09-11 | End: 2019-09-20 | Stop reason: HOSPADM

## 2019-09-16 RX ORDER — OXYBUTYNIN CHLORIDE 10 MG/1
10 TABLET, EXTENDED RELEASE ORAL NIGHTLY
Status: DISCONTINUED | OUTPATIENT
Start: 2019-09-16 | End: 2019-09-20 | Stop reason: HOSPADM

## 2019-09-16 RX ORDER — CEPHALEXIN 250 MG/1
250 CAPSULE ORAL EVERY 8 HOURS SCHEDULED
Status: DISCONTINUED | OUTPATIENT
Start: 2019-09-16 | End: 2019-09-17

## 2019-09-16 RX ORDER — IPRATROPIUM BROMIDE AND ALBUTEROL SULFATE 2.5; .5 MG/3ML; MG/3ML
3 SOLUTION RESPIRATORY (INHALATION)
Status: DISCONTINUED | OUTPATIENT
Start: 2019-09-16 | End: 2019-09-20 | Stop reason: HOSPADM

## 2019-09-16 RX ORDER — SODIUM CHLORIDE 0.9 % (FLUSH) 0.9 %
10 SYRINGE (ML) INJECTION EVERY 12 HOURS SCHEDULED
Status: DISCONTINUED | OUTPATIENT
Start: 2019-09-16 | End: 2019-09-20 | Stop reason: HOSPADM

## 2019-09-16 RX ORDER — ALBUTEROL SULFATE 2.5 MG/3ML
2.5 SOLUTION RESPIRATORY (INHALATION) ONCE
Status: COMPLETED | OUTPATIENT
Start: 2019-09-16 | End: 2019-09-16

## 2019-09-16 RX ORDER — ALBUTEROL SULFATE 90 UG/1
2 AEROSOL, METERED RESPIRATORY (INHALATION) EVERY 4 HOURS PRN
Status: ON HOLD | COMMUNITY
End: 2019-09-20 | Stop reason: SDUPTHER

## 2019-09-16 RX ORDER — SODIUM CHLORIDE 0.9 % (FLUSH) 0.9 %
10 SYRINGE (ML) INJECTION AS NEEDED
Status: DISCONTINUED | OUTPATIENT
Start: 2019-09-16 | End: 2019-09-20 | Stop reason: HOSPADM

## 2019-09-16 RX ORDER — GLIPIZIDE 5 MG/1
2.5 TABLET ORAL EVERY MORNING
Status: DISCONTINUED | OUTPATIENT
Start: 2019-09-17 | End: 2019-09-20 | Stop reason: HOSPADM

## 2019-09-16 RX ORDER — ALBUTEROL SULFATE 2.5 MG/3ML
2.5 SOLUTION RESPIRATORY (INHALATION) EVERY 4 HOURS PRN
COMMUNITY
End: 2019-09-20 | Stop reason: HOSPADM

## 2019-09-16 RX ORDER — LOSARTAN POTASSIUM 25 MG/1
25 TABLET ORAL DAILY
COMMUNITY
End: 2019-12-30

## 2019-09-16 RX ORDER — ROSUVASTATIN CALCIUM 20 MG/1
20 TABLET, COATED ORAL NIGHTLY
Status: DISCONTINUED | OUTPATIENT
Start: 2019-09-16 | End: 2019-09-20 | Stop reason: HOSPADM

## 2019-09-16 RX ORDER — LOSARTAN POTASSIUM 25 MG/1
25 TABLET ORAL DAILY
Status: DISCONTINUED | OUTPATIENT
Start: 2019-09-17 | End: 2019-09-20 | Stop reason: HOSPADM

## 2019-09-16 RX ORDER — WARFARIN SODIUM 4 MG/1
4 TABLET ORAL SEE ADMIN INSTRUCTIONS
COMMUNITY
End: 2019-12-09 | Stop reason: SDUPTHER

## 2019-09-16 RX ORDER — FUROSEMIDE 20 MG/1
20 TABLET ORAL EVERY MORNING
Status: DISCONTINUED | OUTPATIENT
Start: 2019-09-17 | End: 2019-09-17

## 2019-09-16 RX ORDER — PANTOPRAZOLE SODIUM 40 MG/1
40 TABLET, DELAYED RELEASE ORAL 2 TIMES DAILY
Status: DISCONTINUED | OUTPATIENT
Start: 2019-09-16 | End: 2019-09-20 | Stop reason: HOSPADM

## 2019-09-16 RX ORDER — IPRATROPIUM BROMIDE AND ALBUTEROL SULFATE 2.5; .5 MG/3ML; MG/3ML
3 SOLUTION RESPIRATORY (INHALATION) ONCE
Status: COMPLETED | OUTPATIENT
Start: 2019-09-16 | End: 2019-09-16

## 2019-09-16 RX ORDER — BUDESONIDE 0.5 MG/2ML
0.5 INHALANT ORAL
Status: DISCONTINUED | OUTPATIENT
Start: 2019-09-16 | End: 2019-09-20 | Stop reason: HOSPADM

## 2019-09-16 RX ORDER — LOPERAMIDE HYDROCHLORIDE 2 MG/1
2 CAPSULE ORAL DAILY
Status: DISCONTINUED | OUTPATIENT
Start: 2019-09-17 | End: 2019-09-20 | Stop reason: HOSPADM

## 2019-09-16 RX ORDER — GLIPIZIDE 5 MG/1
2.5 TABLET ORAL EVERY MORNING
COMMUNITY
End: 2020-10-28 | Stop reason: ALTCHOICE

## 2019-09-16 RX ADMIN — ROSUVASTATIN CALCIUM 20 MG: 20 TABLET, FILM COATED ORAL at 22:02

## 2019-09-16 RX ADMIN — IPRATROPIUM BROMIDE AND ALBUTEROL SULFATE 3 ML: 2.5; .5 SOLUTION RESPIRATORY (INHALATION) at 22:08

## 2019-09-16 RX ADMIN — ALBUTEROL SULFATE 2.5 MG: 2.5 SOLUTION RESPIRATORY (INHALATION) at 17:54

## 2019-09-16 RX ADMIN — SODIUM CHLORIDE, PRESERVATIVE FREE 10 ML: 5 INJECTION INTRAVENOUS at 22:05

## 2019-09-16 RX ADMIN — WARFARIN SODIUM 4 MG: 4 TABLET ORAL at 22:02

## 2019-09-16 RX ADMIN — PANTOPRAZOLE SODIUM 40 MG: 40 TABLET, DELAYED RELEASE ORAL at 22:04

## 2019-09-16 RX ADMIN — CEPHALEXIN 250 MG: 250 CAPSULE ORAL at 22:02

## 2019-09-16 RX ADMIN — CARVEDILOL 3.12 MG: 3.12 TABLET, FILM COATED ORAL at 22:02

## 2019-09-16 RX ADMIN — IOPAMIDOL 95 ML: 755 INJECTION, SOLUTION INTRAVENOUS at 20:30

## 2019-09-16 RX ADMIN — IPRATROPIUM BROMIDE AND ALBUTEROL SULFATE 3 ML: 2.5; .5 SOLUTION RESPIRATORY (INHALATION) at 12:56

## 2019-09-16 RX ADMIN — OXYBUTYNIN 10 MG: 10 TABLET, FILM COATED, EXTENDED RELEASE ORAL at 22:02

## 2019-09-16 NOTE — ED PROVIDER NOTES
EMERGENCY DEPARTMENT ENCOUNTER    CHIEF COMPLAINT  Chief Complaint: SOA  History given by: Patient  History limited by: Nothing   Room Number: S620/1  PMD: Zenaida Suarez MD      HPI:  Pt is a 84 y.o. female who presents complaining of SOA that began 6 months ago and worsened 2 weeks ago. Pt reports SOA is worse with exertion. Pt is also c/o non-productive cough and chest congestion. Pt denies fever, chills, vomiting, diarrhea, and chest pain. Pt reports she saw her PCP () for SOA who referred her to  (pulmonology) who placed her on Breo inhaler and a nebulizer to treat asthma and partially paralyzed L lung. Pt states she has called 's office but has not been seen since July. Pt reports she saw  (cardiology) where she had a normal workup. Pt states when she was still SOA after pulmonology and cardiology visits, she called  who referred her to an allergist. Pt reports she saw the allergist who put her on Levaquin and told her to come the ED if her symptoms did not improve, so she decided to come in today. Pt denies hx of blood clots.     Duration:  6 months   Onset: Gradual  Timing: Constant  Location: Respiratory  Radiation: N/a  Quality: SOA  Intensity/Severity: Moderate  Progression: Worsening   Associated Symptoms: Non-productive cough and chest congestion  Aggravating Factors: Exertion  Alleviating Factors: None  Previous Episodes: None  Treatment before arrival: Nebulizer, breo, levaquin     PAST MEDICAL HISTORY  Active Ambulatory Problems     Diagnosis Date Noted   • Neck and shoulder pain 03/24/2017   • Arthropathy of shoulder region 03/24/2017   • Carpal tunnel syndrome of left wrist 03/24/2017   • Chronic pain of both shoulders 06/27/2017   • Chronic left shoulder pain 12/05/2017   • Arthropathy of left shoulder 12/05/2017   • Dysarthria 12/07/2017   • DM type 2 (diabetes mellitus, type 2) (CMS/[facility]) 12/07/2017   • PAF (paroxysmal atrial fibrillation)  (CMS/HCC) 12/07/2017   • HLD (hyperlipidemia) 12/07/2017   • Bronchitis 12/07/2017   • Coronary artery disease involving native coronary artery of native heart with angina pectoris (CMS/HCC) 12/07/2017   • CVA (cerebral vascular accident) (CMS/HCC) 12/10/2017   • HTN (hypertension) 01/14/2018   • CKD (chronic kidney disease) stage 3, GFR 30-59 ml/min (CMS/HCC) 01/14/2018   • Chronic combined systolic and diastolic congestive heart failure (CMS/HCC) 01/15/2018   • Infection of prosthetic right knee joint (CMS/HCC) 01/17/2018   • Stasis dermatitis of right lower extremity due to peripheral venous hypertension 04/28/2018   • Current use of long term anticoagulation 04/28/2018   • Morbidly obese (CMS/HCC) 02/08/2019     Resolved Ambulatory Problems     Diagnosis Date Noted   • Hypernatremia 01/15/2018     Past Medical History:   Diagnosis Date   • Aortic calcification (CMS/HCC)    • Aortic regurgitation    • CAD (coronary artery disease)    • Chronic combined systolic and diastolic congestive heart failure (CMS/HCC)    • CKD (chronic kidney disease) stage 3, GFR 30-59 ml/min (CMS/HCC)    • Coronary artery disease involving native coronary artery of native heart with angina pectoris (CMS/HCC)    • DM type 2 (diabetes mellitus, type 2) (CMS/HCC)    • Hypertension    • Mild mitral regurgitation    • Mitral annular calcification    • PAF (paroxysmal atrial fibrillation) (CMS/HCC)    • SSS (sick sinus syndrome) (CMS/HCC)    • Tricuspid regurgitation        PAST SURGICAL HISTORY  Past Surgical History:   Procedure Laterality Date   • CARDIAC CATHETERIZATION     • CHOLECYSTECTOMY     • CORONARY STENT PLACEMENT     • HERNIA REPAIR     • HYSTERECTOMY     • PACEMAKER IMPLANTATION     • REPLACEMENT TOTAL KNEE         FAMILY HISTORY  Family History   Problem Relation Age of Onset   • Heart disease Mother    • Hypertension Mother    • Heart disease Father    • Hypertension Father    • Hypertension Brother    • Heart disease Brother         SOCIAL HISTORY  Social History     Socioeconomic History   • Marital status: Single     Spouse name: Not on file   • Number of children: Not on file   • Years of education: Not on file   • Highest education level: Not on file   Tobacco Use   • Smoking status: Never Smoker   • Smokeless tobacco: Never Used   • Tobacco comment: caffeine use   Substance and Sexual Activity   • Alcohol use: No   • Drug use: No   • Sexual activity: Defer       ALLERGIES  Accupril [quinapril hcl]; Ahist [chlorpheniramine]; Chlorcyclizine; Clarithromycin; Diclofenac sodium; Diphenhydramine; Esomeprazole; Ibuprofen; Levalbuterol; Levocetirizine; Lipitor [atorvastatin]; Lodine [etodolac]; Omeprazole; Pravachol [pravastatin]; Quinapril; Sulfa antibiotics; Sulindac; Valdecoxib; and Prednisone    REVIEW OF SYSTEMS  Review of Systems   Constitutional: Negative for chills and fever.   HENT: Positive for congestion (chest). Negative for sore throat.    Eyes: Negative.    Respiratory: Positive for cough (non-productive) and shortness of breath.    Cardiovascular: Negative for chest pain.   Gastrointestinal: Negative for abdominal pain, diarrhea and vomiting.   Genitourinary: Negative for dysuria.   Musculoskeletal: Negative for neck pain.   Skin: Negative for rash.   Allergic/Immunologic: Negative.    Neurological: Negative for weakness, numbness and headaches.   Hematological: Negative.    Psychiatric/Behavioral: Negative.    All other systems reviewed and are negative.      PHYSICAL EXAM  ED Triage Vitals   Temp Heart Rate Resp BP SpO2   09/16/19 1047 09/16/19 1047 09/16/19 1047 09/16/19 1201 09/16/19 1047   99 °F (37.2 °C) 92 24 125/69 91 %      Temp src Heart Rate Source Patient Position BP Location FiO2 (%)   09/16/19 1047 -- -- -- --   Tympanic           Physical Exam   Constitutional: She is oriented to person, place, and time. No distress.   HENT:   Head: Normocephalic and atraumatic.   Mouth/Throat: Mucous membranes are dry.   Eyes:  EOM are normal. Pupils are equal, round, and reactive to light.   Neck: Normal range of motion. Neck supple.   Cardiovascular: Normal rate, regular rhythm and normal heart sounds.   Pulmonary/Chest: Effort normal. Tachypnea (mildly) noted. No respiratory distress. She has no decreased breath sounds. She has wheezes (faint occasional expiratory wheeze). She has no rhonchi. She has no rales.   Decreased air movement on L    Abdominal: Soft. There is no tenderness. There is no rebound and no guarding.   Musculoskeletal: Normal range of motion. She exhibits no edema.   1+ pedal edema in BLE (chronic)   Neurological: She is alert and oriented to person, place, and time. She has normal sensation and normal strength.   Skin: Skin is warm and dry. No rash noted.   Psychiatric: Mood and affect normal.   Nursing note and vitals reviewed.      LAB RESULTS  Lab Results (last 24 hours)     Procedure Component Value Units Date/Time    CBC & Differential [[id number]] Collected:  09/16/19 1207    Specimen:  Blood Updated:  09/16/19 1301    Narrative:       The following orders were created for panel order CBC & Differential.  Procedure                               Abnormality         Status                     ---------                               -----------         ------                     CBC Auto Differential[[id number]]        Abnormal            Final result                 Please view results for these tests on the individual orders.    Comprehensive Metabolic Panel [[id number]]  (Abnormal) Collected:  09/16/19 1207    Specimen:  Blood from Arm, Right Updated:  09/16/19 1253     Glucose 92 mg/dL      BUN 22 mg/dL      Creatinine 1.12 mg/dL      Sodium 142 mmol/L      Potassium 3.7 mmol/L      Chloride 102 mmol/L      CO2 24.6 mmol/L      Calcium 9.0 mg/dL      Total Protein 7.0 g/dL      Albumin 4.40 g/dL      ALT (SGPT) 19 U/L      AST (SGOT) 25 U/L      Alkaline Phosphatase 81 U/L      Total Bilirubin 0.8 mg/dL      eGFR  Non  Amer 46 mL/min/1.73      Globulin 2.6 gm/dL      A/G Ratio 1.7 g/dL      BUN/Creatinine Ratio 19.6     Anion Gap 15.4 mmol/L     Narrative:       GFR Normal >60  Chronic Kidney Disease <60  Kidney Failure <15    BNP [[id number]]  (Normal) Collected:  09/16/19 1207    Specimen:  Blood from Arm, Right Updated:  09/16/19 1250     proBNP 291.9 pg/mL     Narrative:       Among patients with dyspnea, NT-proBNP is highly sensitive for the detection of acute congestive heart failure. In addition NT-proBNP of <300 pg/ml effectively rules out acute congestive heart failure with 99% negative predictive value.    Troponin [[id number]]  (Normal) Collected:  09/16/19 1207    Specimen:  Blood from Arm, Right Updated:  09/16/19 1253     Troponin T <0.010 ng/mL     Narrative:       Troponin T Reference Range:  <= 0.03 ng/mL-   Negative for AMI  >0.03 ng/mL-     Abnormal for myocardial necrosis.  Clinicians would have to utilize clinical acumen, EKG, Troponin and serial changes to determine if it is an Acute Myocardial Infarction or myocardial injury due to an underlying chronic condition.     Protime-INR [[id number]]  (Abnormal) Collected:  09/16/19 1207    Specimen:  Blood from Arm, Right Updated:  09/16/19 1239     Protime 26.7 Seconds      INR 2.50    CBC Auto Differential [[id number]]  (Abnormal) Collected:  09/16/19 1207    Specimen:  Blood from Arm, Right Updated:  09/16/19 1301     WBC 12.92 10*3/mm3      RBC 4.36 10*6/mm3      Hemoglobin 13.9 g/dL      Hematocrit 42.8 %      MCV 98.2 fL      MCH 31.9 pg      MCHC 32.5 g/dL      RDW 14.6 %      RDW-SD 53.1 fl      MPV 9.8 fL      Platelets 165 10*3/mm3     Manual Differential [[id number]]  (Abnormal) Collected:  09/16/19 1207    Specimen:  Blood from Arm, Right Updated:  09/16/19 1301     Neutrophil % 38.4 %      Lymphocyte % 57.6 %      Monocyte % 3.0 %      Eosinophil % 1.0 %      Neutrophils Absolute 4.96 10*3/mm3      Lymphocytes Absolute 7.44 10*3/mm3       Monocytes Absolute 0.39 10*3/mm3      Eosinophils Absolute 0.13 10*3/mm3      Anisocytosis Mod/2+     Polychromasia Mod/2+     Schistocytes Slight/1+     Smudge Cells Large/3+     Platelet Morphology Normal    Lactic Acid, Plasma [[id number]]  (Normal) Collected:  09/16/19 1406    Specimen:  Blood from Arm, Right Updated:  09/16/19 1448     Lactate 1.2 mmol/L     Blood Culture - Blood, Arm, Left [[id number]] Collected:  09/16/19 1406    Specimen:  Blood from Arm, Left Updated:  09/16/19 1427    Blood Culture - Blood, Arm, Right [[id number]] Collected:  09/16/19 1406    Specimen:  Blood from Arm, Right Updated:  09/16/19 1428    Blood Gas, Arterial [[id number]]  (Abnormal) Collected:  09/16/19 1707    Specimen:  Arterial Blood Updated:  09/16/19 1719     Site Arterial: right radial     Elton's Test Positive     pH, Arterial 7.495 pH units      pCO2, Arterial 30.0 mm Hg      pO2, Arterial 53.5 mm Hg      Comment: Critical:Notify Dr DR ORELLANA (16-Sep-19 17:17:34)Read back ok        HCO3, Arterial 23.1 mmol/L      Base Excess, Arterial 0.6 mmol/L      O2 Saturation Calculated 90.5 %      Barometric Pressure for Blood Gas 749.8 mmHg      Modality Room Air     Rate 24 Breaths/minute     Protime-INR [[id number]]  (Abnormal) Collected:  09/16/19 1956    Specimen:  Blood Updated:  09/16/19 2105     Protime 27.9 Seconds      INR 2.64          I ordered the above labs and reviewed the results    RADIOLOGY  CT Angiogram Chest With Contrast   Final Result      XR Chest 2 View   Final Result   No interval change compared to prior exam 01/2018. There is   marked elevation of the left diaphragm with compressive atelectasis left   lower lobe and limited evaluation of the left lung base. Cardiomegaly.       This report was finalized on 9/16/2019 1:21 PM by Dr. Bhavik Taylor M.D.               I ordered the above noted radiological studies. Interpreted by radiologist. Reviewed by me in PACS.       PROCEDURES  Procedures        PROGRESS AND CONSULTS  ED Course as of Sep 16 2154   Mon Sep 16, 2019   1225 Patient was last admitted here April 2018 for cellulitis versus stasis dermatitis.  Doppler was negative for DVT.  Patient had an echo done May 2018 which showed an EF of 49%, mild dilation of the left ventricle, and moderate mitral regurgitation.  []   1335 EKG          EKG time: 1329  Rhythm/Rate: AV paced rhythm rate of 82  QRS, axis: Left axis deviation, IVCD  ST and T waves: Inverted T waves laterally    Interpreted Contemporaneously by me, independently viewed  EKG is not changed compared to prior EKG done 1/14/2018    []   1810 pO2, Arterial: (!!) 53.5 [CR]   1810 pO2, Arterial: (!!) 53.5 [CR]      ED Course User Index  [CR] Mitzi Munoz  [] Gabino Lipscomb MD     1445: Rechecked pt who reports she is SOA again. Pt reports the breathing treatment gave her temporary relief from SOA. Pt O2 stat 90-94% on RA, on re-exam pt still dyspneic.Informed pt of negative workup including blood work and chest XR. Pt reports she was taken off of her steroids for her knees because  was concerned about it affecting her cardiac function. Discussed steroid packs helping with breathing. Per paperwork brought in by pt, she has gotten a rash while taking prednisone in the past. Discussed consulting  (pulmonology). Pt understands and agrees with the plan, all questions answered.    1554: Spoke with  (pulmonology) who reports he would like to be called back after a blood gas is obtained    1603: Rechecked pt who is in NAD. Informed pt of conversation with  (pulmonology) and plan to obtain blood gas for further evaluation. Pt understands and agrees with the plan, all questions answered.    1720: Spoke with  (pulmonology) who agreed to admit pt and requested a CTA chest be ordered.     1735:  (pulmonology) in ED to evaluate pt       MEDICAL DECISION MAKING  Results were reviewed/discussed with  the patient and they were also made aware of online access. Pt also made aware that some labs, such as cultures, will not be resulted during ER visit and follow up with PMD is necessary.     MDM  Number of Diagnoses or Management Options  Acute respiratory failure with hypoxia (CMS/HCC):   Diagnosis management comments: Patient was mildly hypoxic on room air.  Chest x-ray was stable.  Patient was given nebulizer treatments in the ER.  She remained tachypneic.  ABG showed evidence of hypoxemia.  Case was discussed with Dr. Stauffer and Dr. La.  Patient will be admitted.  CTA of the chest was negative for pulmonary embolus.       Amount and/or Complexity of Data Reviewed  Clinical lab tests: ordered and reviewed (PO2 53.5)  Tests in the radiology section of CPT®: ordered and reviewed (Pt chest XR negative acute but did show elevation of L diaphragm and atelectasis of L lower lobe. CTA chest see final read.)  Tests in the medicine section of CPT®: ordered and reviewed (See procedure notes)  Decide to obtain previous medical records or to obtain history from someone other than the patient: yes (Epic)  Review and summarize past medical records: yes (See ED course)  Discuss the patient with other providers: yes ( (pulmonology)   (pulmonology))  Independent visualization of images, tracings, or specimens: yes    Patient Progress  Patient progress: stable         DIAGNOSIS  Final diagnoses:   Acute respiratory failure with hypoxia (CMS/HCC)       DISPOSITION  ADMISSION    Discussed treatment plan and reason for admission with pt/family and admitting physician.  Pt/family voiced understanding of the plan for admission for further testing/treatment as needed.       Latest Documented Vital Signs:  As of 9:54 PM  BP- 105/84 HR- 81 Temp- 98 °F (36.7 °C) (Oral) O2 sat- 91%    --  Documentation assistance provided by javi Munoz for .  Information recorded by the javi was done at my direction  and has been verified and validated by me.     Mitzi Munoz  09/16/19 1937       Gabino Lipscomb MD  09/16/19 3904

## 2019-09-16 NOTE — PLAN OF CARE
Problem: Patient Care Overview  Goal: Plan of Care Review  Outcome: Ongoing (interventions implemented as appropriate)   09/16/19 6026   Coping/Psychosocial   Plan of Care Reviewed With patient;family   Plan of Care Review   Progress no change   OTHER   Outcome Summary pt admitted from ER from assisted living from [facility]. pt is alert x4 pt became SOA but it has been a ongoing chronic cough famil,y at the bedside will cont to monitor.       Problem: Fall Risk (Adult)  Goal: Identify Related Risk Factors and Signs and Symptoms  Outcome: Ongoing (interventions implemented as appropriate)    Goal: Absence of Fall  Outcome: Ongoing (interventions implemented as appropriate)      Problem: ARDS (Acute Resp Distress Syndrome) (Adult)  Goal: Signs and Symptoms of Listed Potential Problems Will be Absent, Minimized or Managed (ARDS)  Outcome: Ongoing (interventions implemented as appropriate)      Problem: Pain, Acute (Adult)  Goal: Identify Related Risk Factors and Signs and Symptoms  Outcome: Ongoing (interventions implemented as appropriate)    Goal: Acceptable Pain Control/Comfort Level  Outcome: Ongoing (interventions implemented as appropriate)

## 2019-09-16 NOTE — ED NOTES
Pt reports SOA, dry cough since April. PCP put pt on levaquin last Wednesday. Denies chest pain.      Chiquita aVrgas RN  09/16/19 0192

## 2019-09-16 NOTE — PROGRESS NOTES
Pharmacy Consult: Warfarin Dosing/ Monitoring    Caitlyn Longoria is a 84 y.o. female, estimated creatinine clearance is 43.9 mL/min (A) (by C-G formula based on SCr of 1.12 mg/dL (H)). weighing 104 kg (229 lb 8 oz).     has a past medical history of Aortic calcification (CMS/HCC), Aortic regurgitation, CAD (coronary artery disease), Chronic combined systolic and diastolic congestive heart failure (CMS/HCC), CKD (chronic kidney disease) stage 3, GFR 30-59 ml/min (CMS/[facility]), Coronary artery disease involving native coronary artery of native heart with angina pectoris (CMS/HCC), DM type 2 (diabetes mellitus, type 2) (CMS/[facility]), Hypertension, Mild mitral regurgitation, Mitral annular calcification, PAF (paroxysmal atrial fibrillation) (CMS/[facility]), SSS (sick sinus syndrome) (CMS/[facility]), and Tricuspid regurgitation.    Social History     Tobacco Use   • Smoking status: Never Smoker   • Smokeless tobacco: Never Used   • Tobacco comment: caffeine use   Substance Use Topics   • Alcohol use: No   • Drug use: No       Results from last 7 days   Lab Units 09/16/19  1207   INR  2.50*   HEMOGLOBIN g/dL 13.9   HEMATOCRIT % 42.8   PLATELETS 10*3/mm3 165     Results from last 7 days   Lab Units 09/16/19  1207   SODIUM mmol/L 142   POTASSIUM mmol/L 3.7   CHLORIDE mmol/L 102   CO2 mmol/L 24.6   BUN mg/dL 22   CREATININE mg/dL 1.12*   CALCIUM mg/dL 9.0   BILIRUBIN mg/dL 0.8   ALK PHOS U/L 81   ALT (SGPT) U/L 19   AST (SGOT) U/L 25   GLUCOSE mg/dL 92     Anticoagulation history: Warfarin therapy managed by  Anticoagulation Clinic since September of last year. Last visit on 8/28/19, maintained regimen of Warfarin 2mg Tues and Saturday, Warfarin 4mg all other days. INR has been in range of 2-3 on this regimen each of the previous visits to clinic since February    [facility] Anticoagulation:  Consulting provider: Dr. La  Start date: 9/16/19  Indication: PAF  Target INR: 2-3  Expected duration: Indefinite   Bridge Therapy: No                Date 9/16            INR 2.5            Warfarin dose 4mg              Potential drug interactions: Aspirin - may increase risk for bleeding    Relevant nutrition status: None    Other: None    Education complete?/ Date: Not at this time/last visited  coagulation clinic on 8/28/19    Assessment/Plan:  Dose:  Warfarin 2mg Tues, Sat  Warfarin 4mg Sun, Mon, Wed, Thurs.,Fri  Monitor: S/s of bleeding, daily INR  Follow up: Follow and trend INR daily, adjust as needed    Pharmacy will continue to follow until discharge or discontinuation of warfarin.   Bean Franklin, [facility]  9/16/2019

## 2019-09-16 NOTE — PROGRESS NOTES
Clinical Pharmacy Services: Medication History    I did not interview the patient but have reviewed medication list. Agree with medication history and documentation as performed by Greta Ruiz, Medication History Technician.    Malissa Marino, PharmD, [facility]  Clinical Pharmacy Specialist, Emergency Medicine  Work  Phone: [phone]  __________________________________________________________________________________________________________________________    Clinical Pharmacy Services: Medication History    Caitlyn Longoria is a 84 y.o. female presenting to [facility] for   Chief Complaint   Patient presents with   • Shortness of Breath       She  has a past medical history of Aortic calcification (CMS/[facility]), Aortic regurgitation, CAD (coronary artery disease), Chronic combined systolic and diastolic congestive heart failure (CMS/[facility]), CKD (chronic kidney disease) stage 3, GFR 30-59 ml/min (CMS/[facility]), Coronary artery disease involving native coronary artery of native heart with angina pectoris (CMS/[facility]), DM type 2 (diabetes mellitus, type 2) (CMS/[facility]), Hypertension, Mild mitral regurgitation, Mitral annular calcification, PAF (paroxysmal atrial fibrillation) (CMS/[facility]), SSS (sick sinus syndrome) (CMS/[facility]), and Tricuspid regurgitation.    Allergies as of 09/16/2019 - Reviewed 09/16/2019   Allergen Reaction Noted   • Accupril [quinapril hcl] Delirium 03/24/2017   • Ahist [chlorpheniramine] Nausea Only, Other (See Comments), and Dizziness 03/24/2017   • Chlorcyclizine Unknown (See Comments) 06/21/2013   • Clarithromycin Nausea Only 04/20/2016   • Diclofenac sodium Unknown (See Comments) 06/21/2013   • Diphenhydramine  04/20/2016   • Esomeprazole GI Intolerance 04/20/2016   • Ibuprofen Other (See Comments) 03/24/2017   • Levalbuterol Swelling 04/20/2016   • Levocetirizine Other (See Comments) 04/20/2016   • Lipitor [atorvastatin] Other (See Comments) 05/12/2016   • Lodine [etodolac]  03/24/2017   •  Omeprazole Nausea Only 04/20/2016   • Pravachol [pravastatin] Nausea Only and Other (See Comments) 03/24/2017   • Quinapril Swelling and Confusion 04/20/2016   • Sulfa antibiotics  04/20/2016   • Sulindac Myalgia 04/20/2016   • Valdecoxib Irritability 04/20/2016   • Prednisone Rash 04/20/2016       Medication information was obtained from: Patient, medication list  Pharmacy and Phone Number: [facility] [phone]    Prior to Admission Medications     Prescriptions Last Dose Informant Patient Reported? Taking?   S acetaminophen (TYLENOL) 650 MG 8 hr tablet 9/16/2019 Self Yes Yes    Take 650 mg by mouth 2 (Two) Times a Day.    Acetylcysteine 500 MG capsule 9/15/2019 Self Yes Yes    Take 1,000 mg by mouth Daily With Dinner.    albuterol (PROVENTIL) (2.5 MG/3ML) 0.083% nebulizer solution 9/16/2019 Self Yes Yes    Take 2.5 mg by nebulization Every 4 (Four) Hours As Needed for Wheezing.    albuterol sulfate  (90 Base) MCG/ACT inhaler 9/16/2019 Self Yes Yes    Inhale 2 puffs Every 4 (Four) Hours As Needed for Wheezing.    aspirin 81 MG tablet 9/16/2019 Self Yes Yes    Take 81 mg by mouth Daily.    Calcium Carb-Cholecalciferol (CALCIUM 500+D) 500-200 MG-UNIT tablet 9/16/2019 Self Yes Yes    Take 1 tablet by mouth 2 (Two) Times a Day.    carvedilol (COREG) 3.125 MG tablet 9/16/2019 Self No Yes    Take 1 tablet by mouth 2 (Two) Times a Day.    cefadroxil (DURICEF) 500 MG capsule 9/16/2019 Self Yes Yes    Take 500 mg by mouth 2 (Two) Times a Day. Taken consistently for MSSA infection    cetirizine (ZyrTEC) 10 MG tablet 9/16/2019 Self Yes Yes    Take 10 mg by mouth 2 (Two) Times a Day.    fluticasone (FLONASE) 50 MCG/ACT nasal spray 9/16/2019 Self Yes Yes    1 spray into the nostril(s) as directed by provider 2 (Two) Times a Day.    Fluticasone Furoate-Vilanterol (BREO ELLIPTA) 200-25 MCG/INH inhaler 9/16/2019 Self Yes Yes    Inhale 1 puff Daily.    furosemide (LASIX) 20 MG tablet 9/16/2019 Self Yes Yes    Take 20 mg by  mouth Every Morning.    glipiZIDE (GLUCOTROL) 5 MG tablet 9/16/2019 Self Yes Yes    Take 2.5 mg by mouth Every Morning.    levoFLOXacin (LEVAQUIN) 750 MG tablet 9/16/2019 Self Yes Yes    Take 750 mg by mouth Daily.    loperamide (IMODIUM) 2 MG capsule 9/16/2019 Self Yes Yes    Take 2 mg by mouth Daily. After first bowel movement    losartan (COZAAR) 25 MG tablet 9/16/2019 Self Yes Yes    Take 25 mg by mouth Daily.    Omega-3 Fatty Acids (FISH OIL) 1000 MG capsule capsule 9/16/2019 Self Yes Yes    Take 1,000 mg by mouth 2 (Two) Times a Day With Meals.    oxazepam (SERAX) 10 MG capsule 9/15/2019 Self No Yes    Take 1 capsule by mouth Every Night.    oxybutynin XL (DITROPAN-XL) 10 MG 24 hr tablet 9/15/2019 Self Yes Yes    Take 10 mg by mouth every night at bedtime.    pantoprazole (PROTONIX) 40 MG EC tablet 9/16/2019 Self No Yes    Take 1 tablet by mouth 2 (Two) Times a Day.    polyethyl glycol-propyl glycol (LUBRICATING EYE DROPS) 0.4-0.3 % solution ophthalmic solution Past Week Self Yes Yes    Administer 1 drop to both eyes Every 1 (One) Hour As Needed.    rosuvastatin (CRESTOR) 20 MG tablet 9/15/2019 Self Yes Yes    Take 20 mg by mouth Every Night.    Umeclidinium Bromide (INCRUSE ELLIPTA) 62.5 MCG/INH aerosol powder  9/16/2019 Self Yes Yes    Inhale 1 each Daily.    warfarin (COUMADIN) 4 MG tablet 9/14/2019 Self Yes Yes    Take 2 mg by mouth 2 (Two) Times a Week. Tues and Sat and 4 mg on all other days    warfarin (COUMADIN) 4 MG tablet 9/15/2019 Self Yes Yes    Take 4 mg by mouth See Admin Instructions. 4 mg on Sun, Mon, Wed, Thurs, Fri and 2 mg on Tues and Sat    amoxicillin (AMOXIL) 500 MG capsule  Self No No    Take 4 capsule one hour prior to dental procedure            Medication notes: Removed: Azelastine, Clobetasol, Lotrisone, Ketoconazole, B-12, and Biotene    Added: Levaquin, Albuterol solution, Incruse inhaler, and Lubricating eye drops    Per medication list provided by the patient    This medication  list is complete to the best of my knowledge as of 9/16/2019    Please call if questions.    Greta Ruiz, Medication History Technician  9/16/2019 5:45 PM

## 2019-09-17 LAB
ANION GAP SERPL CALCULATED.3IONS-SCNC: 13.1 MMOL/L (ref 5–15)
BUN BLD-MCNC: 20 MG/DL (ref 8–23)
BUN/CREAT SERPL: 19 (ref 7–25)
CALCIUM SPEC-SCNC: 8.3 MG/DL (ref 8.6–10.5)
CHLORIDE SERPL-SCNC: 105 MMOL/L (ref 98–107)
CO2 SERPL-SCNC: 23.9 MMOL/L (ref 22–29)
CREAT BLD-MCNC: 1.05 MG/DL (ref 0.57–1)
DEPRECATED RDW RBC AUTO: 53.5 FL (ref 37–54)
ERYTHROCYTE [DISTWIDTH] IN BLOOD BY AUTOMATED COUNT: 14.8 % (ref 12.3–15.4)
GFR SERPL CREATININE-BSD FRML MDRD: 50 ML/MIN/1.73
GLUCOSE BLD-MCNC: 114 MG/DL (ref 65–99)
HCT VFR BLD AUTO: 39 % (ref 34–46.6)
HGB BLD-MCNC: 12.1 G/DL (ref 12–15.9)
INR PPP: 2.54 (ref 0.9–1.1)
MCH RBC QN AUTO: 30.5 PG (ref 26.6–33)
MCHC RBC AUTO-ENTMCNC: 31 G/DL (ref 31.5–35.7)
MCV RBC AUTO: 98.2 FL (ref 79–97)
PLATELET # BLD AUTO: 152 10*3/MM3 (ref 140–450)
PMV BLD AUTO: 10.3 FL (ref 6–12)
POTASSIUM BLD-SCNC: 3.5 MMOL/L (ref 3.5–5.2)
PROTHROMBIN TIME: 27 SECONDS (ref 11.7–14.2)
RBC # BLD AUTO: 3.97 10*6/MM3 (ref 3.77–5.28)
SODIUM BLD-SCNC: 142 MMOL/L (ref 136–145)
WBC NRBC COR # BLD: 9.75 10*3/MM3 (ref 3.4–10.8)

## 2019-09-17 PROCEDURE — 97165 OT EVAL LOW COMPLEX 30 MIN: CPT

## 2019-09-17 PROCEDURE — 85610 PROTHROMBIN TIME: CPT | Performed by: INTERNAL MEDICINE

## 2019-09-17 PROCEDURE — 90686 IIV4 VACC NO PRSV 0.5 ML IM: CPT | Performed by: INTERNAL MEDICINE

## 2019-09-17 PROCEDURE — G0008 ADMIN INFLUENZA VIRUS VAC: HCPCS | Performed by: INTERNAL MEDICINE

## 2019-09-17 PROCEDURE — 99222 1ST HOSP IP/OBS MODERATE 55: CPT | Performed by: INTERNAL MEDICINE

## 2019-09-17 PROCEDURE — 25010000002 INFLUENZA VAC SPLIT QUAD 0.5 ML SUSPENSION PREFILLED SYRINGE: Performed by: INTERNAL MEDICINE

## 2019-09-17 PROCEDURE — 85027 COMPLETE CBC AUTOMATED: CPT | Performed by: INTERNAL MEDICINE

## 2019-09-17 PROCEDURE — 94799 UNLISTED PULMONARY SVC/PX: CPT

## 2019-09-17 PROCEDURE — 97110 THERAPEUTIC EXERCISES: CPT

## 2019-09-17 PROCEDURE — 97535 SELF CARE MNGMENT TRAINING: CPT

## 2019-09-17 PROCEDURE — 97161 PT EVAL LOW COMPLEX 20 MIN: CPT

## 2019-09-17 PROCEDURE — 80048 BASIC METABOLIC PNL TOTAL CA: CPT | Performed by: INTERNAL MEDICINE

## 2019-09-17 PROCEDURE — 25010000002 METHYLPREDNISOLONE PER 40 MG: Performed by: INTERNAL MEDICINE

## 2019-09-17 RX ORDER — ACETAMINOPHEN 325 MG/1
650 TABLET ORAL EVERY 6 HOURS PRN
Status: DISCONTINUED | OUTPATIENT
Start: 2019-09-17 | End: 2019-09-20 | Stop reason: HOSPADM

## 2019-09-17 RX ORDER — FUROSEMIDE 40 MG/1
40 TABLET ORAL EVERY MORNING
Status: DISCONTINUED | OUTPATIENT
Start: 2019-09-18 | End: 2019-09-20 | Stop reason: HOSPADM

## 2019-09-17 RX ORDER — CEPHALEXIN 500 MG/1
500 CAPSULE ORAL EVERY 8 HOURS SCHEDULED
Status: DISCONTINUED | OUTPATIENT
Start: 2019-09-17 | End: 2019-09-20 | Stop reason: HOSPADM

## 2019-09-17 RX ORDER — METHYLPREDNISOLONE SODIUM SUCCINATE 40 MG/ML
40 INJECTION, POWDER, LYOPHILIZED, FOR SOLUTION INTRAMUSCULAR; INTRAVENOUS EVERY 8 HOURS
Status: DISCONTINUED | OUTPATIENT
Start: 2019-09-17 | End: 2019-09-19

## 2019-09-17 RX ADMIN — IPRATROPIUM BROMIDE AND ALBUTEROL SULFATE 3 ML: 2.5; .5 SOLUTION RESPIRATORY (INHALATION) at 08:09

## 2019-09-17 RX ADMIN — CEPHALEXIN 500 MG: 500 CAPSULE ORAL at 13:07

## 2019-09-17 RX ADMIN — PANTOPRAZOLE SODIUM 40 MG: 40 TABLET, DELAYED RELEASE ORAL at 08:14

## 2019-09-17 RX ADMIN — IPRATROPIUM BROMIDE AND ALBUTEROL SULFATE 3 ML: 2.5; .5 SOLUTION RESPIRATORY (INHALATION) at 11:34

## 2019-09-17 RX ADMIN — METHYLPREDNISOLONE SODIUM SUCCINATE 40 MG: 40 INJECTION, POWDER, FOR SOLUTION INTRAMUSCULAR; INTRAVENOUS at 21:08

## 2019-09-17 RX ADMIN — LOSARTAN POTASSIUM 25 MG: 25 TABLET, FILM COATED ORAL at 08:14

## 2019-09-17 RX ADMIN — IPRATROPIUM BROMIDE AND ALBUTEROL SULFATE 3 ML: 2.5; .5 SOLUTION RESPIRATORY (INHALATION) at 15:05

## 2019-09-17 RX ADMIN — WARFARIN SODIUM 2 MG: 2 TABLET ORAL at 17:19

## 2019-09-17 RX ADMIN — SODIUM CHLORIDE, PRESERVATIVE FREE 10 ML: 5 INJECTION INTRAVENOUS at 08:14

## 2019-09-17 RX ADMIN — OXYBUTYNIN 10 MG: 10 TABLET, FILM COATED, EXTENDED RELEASE ORAL at 21:09

## 2019-09-17 RX ADMIN — CARVEDILOL 3.12 MG: 3.12 TABLET, FILM COATED ORAL at 21:08

## 2019-09-17 RX ADMIN — LOPERAMIDE HYDROCHLORIDE 2 MG: 2 CAPSULE ORAL at 08:14

## 2019-09-17 RX ADMIN — ROSUVASTATIN CALCIUM 20 MG: 20 TABLET, FILM COATED ORAL at 21:09

## 2019-09-17 RX ADMIN — ACETAMINOPHEN 650 MG: 325 TABLET, FILM COATED ORAL at 09:14

## 2019-09-17 RX ADMIN — CEPHALEXIN 250 MG: 250 CAPSULE ORAL at 06:41

## 2019-09-17 RX ADMIN — PANTOPRAZOLE SODIUM 40 MG: 40 TABLET, DELAYED RELEASE ORAL at 21:09

## 2019-09-17 RX ADMIN — ASPIRIN 81 MG: 81 TABLET, COATED ORAL at 08:14

## 2019-09-17 RX ADMIN — IPRATROPIUM BROMIDE AND ALBUTEROL SULFATE 3 ML: 2.5; .5 SOLUTION RESPIRATORY (INHALATION) at 20:28

## 2019-09-17 RX ADMIN — CEPHALEXIN 500 MG: 500 CAPSULE ORAL at 21:09

## 2019-09-17 RX ADMIN — SODIUM CHLORIDE, PRESERVATIVE FREE 10 ML: 5 INJECTION INTRAVENOUS at 08:15

## 2019-09-17 RX ADMIN — SODIUM CHLORIDE, PRESERVATIVE FREE 10 ML: 5 INJECTION INTRAVENOUS at 21:09

## 2019-09-17 RX ADMIN — METHYLPREDNISOLONE SODIUM SUCCINATE 40 MG: 40 INJECTION, POWDER, FOR SOLUTION INTRAMUSCULAR; INTRAVENOUS at 11:29

## 2019-09-17 RX ADMIN — GLIPIZIDE 2.5 MG: 5 TABLET ORAL at 06:39

## 2019-09-17 RX ADMIN — CARVEDILOL 3.12 MG: 3.12 TABLET, FILM COATED ORAL at 08:14

## 2019-09-17 RX ADMIN — SODIUM CHLORIDE, PRESERVATIVE FREE 10 ML: 5 INJECTION INTRAVENOUS at 21:53

## 2019-09-17 RX ADMIN — BUDESONIDE 0.5 MG: 0.5 SUSPENSION RESPIRATORY (INHALATION) at 08:09

## 2019-09-17 RX ADMIN — INFLUENZA A VIRUS A/BRISBANE/02/2018 IVR-190 (H1N1) ANTIGEN (PROPIOLACTONE INACTIVATED), INFLUENZA A VIRUS A/KANSAS/14/2017 X-327 (H3N2) ANTIGEN (PROPIOLACTONE INACTIVATED), INFLUENZA B VIRUS B/MARYLAND/15/2016 ANTIGEN (PROPIOLACTONE INACTIVATED), INFLUENZA B VIRUS B/PHUKET/3073/2013 BVR-1B ANTIGEN (PROPIOLACTONE INACTIVATED) 0.5 ML: 15; 15; 15; 15 INJECTION, SUSPENSION INTRAMUSCULAR at 13:05

## 2019-09-17 RX ADMIN — FUROSEMIDE 20 MG: 20 TABLET ORAL at 06:39

## 2019-09-17 NOTE — THERAPY EVALUATION
Patient Name: Caitlyn Longoria  : 1934    MRN: [id number]                              Today's Date: 2019       Admit Date: 2019    Visit Dx:     ICD-10-CM ICD-9-CM   1. Acute respiratory failure with hypoxia (CMS/[facility]) J96.01 518.81     Patient Active Problem List   Diagnosis   • Neck and shoulder pain   • Arthropathy of shoulder region   • Carpal tunnel syndrome of left wrist   • Chronic pain of both shoulders   • Chronic left shoulder pain   • Arthropathy of left shoulder   • Dysarthria   • DM type 2 (diabetes mellitus, type 2) (CMS/H[facility])   • PAF (paroxysmal atrial fibrillation) (CMS/[facility])   • HLD (hyperlipidemia)   • Bronchitis   • Coronary artery disease involving native coronary artery of native heart with angina pectoris (CMS/HCC)   • CVA (cerebral vascular accident) (CMS/[facility])   • HTN (hypertension)   • CKD (chronic kidney disease) stage 3, GFR 30-59 ml/min (CMS/[facility])   • Chronic combined systolic and diastolic congestive heart failure (CMS/[facility])   • Infection of prosthetic right knee joint (CMS/[facility])   • Stasis dermatitis of right lower extremity due to peripheral venous hypertension   • Current use of long term anticoagulation   • Morbidly obese (CMS/[facility])   • Acute respiratory failure with hypoxia (CMS/[facility])     Past Medical History:   Diagnosis Date   • Aortic calcification (CMS/HCC)     mild, on echo 2017   • Aortic regurgitation     Trace   • CAD (coronary artery disease)    • Chronic combined systolic and diastolic congestive heart failure (CMS/[facility])    • CKD (chronic kidney disease) stage 3, GFR 30-59 ml/min (CMS/[facility])    • Coronary artery disease involving native coronary artery of native heart with angina pectoris (CMS/HCC)    • DM type 2 (diabetes mellitus, type 2) (CMS/HCC)    • Hypertension    • Mild mitral regurgitation    • Mitral annular calcification     2017- echo, moderate   • PAF (paroxysmal atrial fibrillation) (CMS/HCC)    • SSS (sick sinus syndrome) (CMS/[facility])    •  Tricuspid regurgitation     Trace     Past Surgical History:   Procedure Laterality Date   • CARDIAC CATHETERIZATION     • CHOLECYSTECTOMY     • CORONARY STENT PLACEMENT     • HERNIA REPAIR     • HYSTERECTOMY     • PACEMAKER IMPLANTATION     • REPLACEMENT TOTAL KNEE       General Information     Row Name 09/17/19 1023          PT Evaluation Time/Intention    Document Type  evaluation  -LB     Mode of Treatment  physical therapy  -LB     Row Name 09/17/19 1023          General Information    Patient Profile Reviewed?  yes  -LB     Prior Level of Function  independent:;all household mobility  -LB     Existing Precautions/Restrictions  oxygen therapy device and L/min  -LB     Barriers to Rehab  none identified  -LB     Row Name 09/17/19 1023          Resource/Environmental Concerns    Current Living Arrangements  independent/assisted living facility own apartment  -LB     Row Name 09/17/19 1023          Stairs Within Home, Primary    Number of Stairs, Within Home, Primary  none  -LB     Stair Railings, Within Home, Primary  none  -LB     Row Name 09/17/19 1023          Cognitive Assessment/Intervention- PT/OT    Orientation Status (Cognition)  oriented x 4  -LB     Row Name 09/17/19 1023          Safety Issues, Functional Mobility    Impairments Affecting Function (Mobility)  endurance/activity tolerance  -LB       User Key  (r) = Recorded By, (t) = Taken By, (c) = Cosigned By    Initials Name Provider Type    LB Jie Tovar, PT Physical Therapist        Mobility     Row Name 09/17/19 1024          Bed Mobility Assessment/Treatment    Bed Mobility Assessment/Treatment  supine-sit-supine  -LB     Supine-Sit-Supine [place] (Bed Mobility)  independent  -LB     Row Name 09/17/19 1024          Sit-Stand Transfer    Sit-Stand [place] (Transfers)  conditional independence  -     Assistive Device (Sit-Stand Transfers)  walker, front-wheeled  -     Row Name 09/17/19 1024          Gait/Stairs Assessment/Training     [place] Level (Gait)  stand by assist;contact guard  -LB     Assistive Device (Gait)  walker, front-wheeled  -LB     Distance in Feet (Gait)  50; 150  -LB     Deviations/Abnormal Patterns (Gait)  jeremi decreased  -LB     Bilateral Gait Deviations  forward flexed posture  -LB     Comment (Gait/Stairs)  complained of SOA at end of ambulation.  No LOB or balance issues noted with use of RWx  -LB       User Key  (r) = Recorded By, (t) = Taken By, (c) = Cosigned By    Initials Name Provider Type    Jie Luu PT Physical Therapist        Obj/Interventions     Row Name 09/17/19 1026          General ROM    GENERAL ROM COMMENTS  [place] WFL  -LB     Row Name 09/17/19 1026          MMT (Manual Muscle Testing)    General MMT Comments  [place] WFL  -LB     Row Name 09/17/19 1026          Static Sitting Balance    Level of [place] (Unsupported Sitting, Static Balance)  independent  -LB     Sitting Position (Unsupported Sitting, Static Balance)  sitting on edge of bed  -LB     Row Name 09/17/19 1026          Static Standing Balance    Level of [place] (Supported Standing, Static Balance)  conditional independence  -LB     Assistive Device Utilized (Supported Standing, Static Balance)  walker, rolling  -LB     Row Name 09/17/19 1026          Dynamic Standing Balance    Level of [place], Reaches Outside Midline (Standing, Dynamic Balance)  supervision  -LB     Assistive Device Utilized (Supported Standing, Dynamic Balance)  walker, rolling  -LB       User Key  (r) = Recorded By, (t) = Taken By, (c) = Cosigned By    Initials Name Provider Type    Jie Luu PT Physical Therapist        Goals/Plan     Row Name 09/17/19 1028          Transfer Goal 1 (PT)    Activity/Assistive Device (Transfer Goal 1, PT)  sit-to-stand/stand-to-sit;walker, rolling  -LB     [place] Level/Cues Needed (Transfer Goal 1, PT)  conditional independence  -LB     Time Frame (Transfer Goal 1, PT)  long term goal (LTG);1  week  -LB     Row Name 09/17/19 1028          Gait Training Goal 1 (PT)    Activity/Assistive Device (Gait Training Goal 1, PT)  walker, rolling  -LB     [place] Level (Gait Training Goal 1, PT)  conditional independence  -LB     Distance (Gait Goal 1, PT)  200  -LB     Time Frame (Gait Training Goal 1, PT)  long term goal (LTG);1 week  -LB       User Key  (r) = Recorded By, (t) = Taken By, (c) = Cosigned By    Initials Name Provider Type    Jie Luu, PT Physical Therapist        Clinical Impression     Row Name 09/17/19 1026          Plan of Care Review    Plan of Care Reviewed With  patient  -LB     Row Name 09/17/19 1026          Physical Therapy Clinical Impression    Patient/Family Goals Statement (PT Clinical Impression)  To be able to breath better  -LB     Criteria for Skilled Interventions Met (PT Clinical Impression)  yes  -LB     Rehab Potential (PT Clinical Summary)  good, to achieve stated therapy goals  -LB     Row Name 09/17/19 1026          Vital Signs    Pre SpO2 (%)  94  -LB     O2 Delivery Pre Treatment  supplemental O2  -LB     Post SpO2 (%)  96  -LB     O2 Delivery Post Treatment  supplemental O2  -LB     Pre Patient Position  Supine  -LB     Post Patient Position  Sitting  -LB     Row Name 09/17/19 1026          Positioning and Restraints    Pre-Treatment Position  in bed  -LB     Post Treatment Position  chair  -LB     In Chair  notified nsg;call light within reach;encouraged to call for assist  -LB       User Key  (r) = Recorded By, (t) = Taken By, (c) = Cosigned By    Initials Name Provider Type    Jie Luu, PT Physical Therapist        Outcome Measures     Row Name 09/17/19 1032          How much help from another person do you currently need...    Turning from your back to your side while in flat bed without using bedrails?  4  -LB     Moving from lying on back to sitting on the side of a flat bed without bedrails?  4  -LB     Moving to and from a bed to a chair  (including a wheelchair)?  4  -LB     Standing up from a chair using your arms (e.g., wheelchair, bedside chair)?  4  -LB     Climbing 3-5 steps with a railing?  3  -LB     To walk in hospital room?  3  -LB     AM-PAC 6 Clicks Score (PT)  22  -LB     Row Name 09/17/19 1032          Functional Assessment    Outcome Measure Options  AM-PAC 6 Clicks Basic Mobility (PT)  -LB       User Key  (r) = Recorded By, (t) = Taken By, (c) = Cosigned By    Initials Name Provider Type    LB Jie Tovar PT Physical Therapist        Physical Therapy Education     Title: PT OT SLP Therapies (Done)     Topic: Physical Therapy (Done)     Point: Mobility training (Done)     Learning Progress Summary           Patient Acceptance, E,TB, VU,NR by  at 9/17/2019 10:29 AM                   Point: Precautions (Done)     Learning Progress Summary           Patient Acceptance, E,TB, VU,NR by  at 9/17/2019 10:29 AM                               User Key     Initials Effective Dates Name Provider Type Discipline     04/03/18 -  Jie Tovar PT Physical Therapist PT              PT Recommendation and Plan  Planned Therapy Interventions (PT Eval): home exercise program, patient/family education, transfer training, gait training  Outcome Summary/Treatment Plan (PT)  Anticipated Equipment Needs at Discharge (PT): (pt has rwx and rollator.  Discussed how pt may use rollator to go to dining room but she doesnt like it)  Anticipated Discharge Disposition (PT): home with assist  Plan of Care Reviewed With: patient  Outcome Summary: Pt is a pleasant 85 y/o female admitted with SOA that has progressively gotten worse over last several months.  The admitted with asthma excerbation with hyopoxia on room air.  The patient did not need any assist for transfers and able to ambulate over 100 ft with Rwx and supervision.  Pt was encouraged to walk several times with nursing and PT will  check to be sure pt is bneing mobilized as needed to be able to  return to her [facility] apartment.       Time Calculation:   PT Charges     Row Name 09/17/19 1032             Time Calculation    Start Time  0950  -LB      Stop Time  1022  -LB      Time Calculation (min)  32 min  -LB      PT Received On  09/17/19  -LB      PT - Next Appointment  09/18/19  -LB      PT Goal Re-Cert Due Date  09/24/19  -LB        User Key  (r) = Recorded By, (t) = Taken By, (c) = Cosigned By    Initials Name Provider Type    Jie Luu, PT Physical Therapist        Therapy Charges for Today     Code Description Service Date Service Provider Modifiers Qty    28136789924 HC PT EVAL LOW COMPLEXITY 2 9/17/2019 Jie Tovar, PT GP 1    43721121045 HC PT THER PROC EA 15 MIN 9/17/2019 Jie Tovar, PT GP 1          PT G-Codes  Outcome Measure Options: AM-PAC 6 Clicks Basic Mobility (PT)  AM-PAC 6 Clicks Score (PT): 22    Jie Tovar PT  9/17/2019

## 2019-09-17 NOTE — THERAPY EVALUATION
Acute Care - Occupational Therapy Initial Evaluation  [facility]     Patient Name: Caitlyn Longoria  : 1934  MRN: [id number]  Today's Date: 2019             Admit Date: 2019       ICD-10-CM ICD-9-CM   1. Acute respiratory failure with hypoxia (CMS/HCC) J96.01 518.81     Patient Active Problem List   Diagnosis   • Neck and shoulder pain   • Arthropathy of shoulder region   • Carpal tunnel syndrome of left wrist   • Chronic pain of both shoulders   • Chronic left shoulder pain   • Arthropathy of left shoulder   • Dysarthria   • DM type 2 (diabetes mellitus, type 2) (CMS/HCC)   • PAF (paroxysmal atrial fibrillation) (CMS/HCC)   • HLD (hyperlipidemia)   • Bronchitis   • Coronary artery disease involving native coronary artery of native heart with angina pectoris (CMS/HCC)   • CVA (cerebral vascular accident) (CMS/HCC)   • HTN (hypertension)   • CKD (chronic kidney disease) stage 3, GFR 30-59 ml/min (CMS/HCC)   • Chronic combined systolic and diastolic congestive heart failure (CMS/HCC)   • Infection of prosthetic right knee joint (CMS/HCC)   • Stasis dermatitis of right lower extremity due to peripheral venous hypertension   • Current use of long term anticoagulation   • Morbidly obese (CMS/HCC)   • Acute respiratory failure with hypoxia (CMS/HCC)     Past Medical History:   Diagnosis Date   • Aortic calcification (CMS/HCC)     mild, on echo 2017   • Aortic regurgitation     Trace   • CAD (coronary artery disease)    • Chronic combined systolic and diastolic congestive heart failure (CMS/HCC)    • CKD (chronic kidney disease) stage 3, GFR 30-59 ml/min (CMS/HCC)    • Coronary artery disease involving native coronary artery of native heart with angina pectoris (CMS/HCC)    • DM type 2 (diabetes mellitus, type 2) (CMS/HCC)    • Hypertension    • Mild mitral regurgitation    • Mitral annular calcification     2017- echo, moderate   • PAF (paroxysmal atrial fibrillation) (CMS/HCC)    • SSS  (sick sinus syndrome) (CMS/HCC)    • Tricuspid regurgitation     Trace     Past Surgical History:   Procedure Laterality Date   • CARDIAC CATHETERIZATION     • CHOLECYSTECTOMY     • CORONARY STENT PLACEMENT     • HERNIA REPAIR     • HYSTERECTOMY     • PACEMAKER IMPLANTATION     • REPLACEMENT TOTAL KNEE            OT ASSESSMENT FLOWSHEET (last 12 hours)      Occupational Therapy Evaluation     Row Name 09/17/19 1446                   OT Evaluation Time/Intention    Subjective Information  complains of;weakness  -SG        Document Type  evaluation  -SG        Mode of Treatment  individual therapy;occupational therapy  -SG           General Information    Patient Profile Reviewed?  yes  -SG        Patient Observations  alert;agree to therapy;cooperative  -SG        General Observations of Patient  sitting in chair  -SG        Prior Level of Function  independent:;ADL's  -SG        Existing Precautions/Restrictions  oxygen therapy device and L/min  -SG           Cognitive Assessment/Intervention- PT/OT    Orientation Status (Cognition)  oriented x 4  -SG        Follows Commands (Cognition)  WFL  -SG           Bed Mobility Assessment/Treatment    Comment (Bed Mobility)  up in chair  -SG           Transfer Assessment/Treatment    Transfer Assessment/Treatment  stand-sit transfer  -SG           Sit-Stand Transfer    Sit-Stand Hall (Transfers)  supervision  -SG        Assistive Device (Sit-Stand Transfers)  walker, front-wheeled  -SG           Stand-Sit Transfer    Stand-Sit Hall (Transfers)  supervision  -SG        Assistive Device (Stand-Sit Transfers)  walker, front-wheeled  -SG           ADL Assessment/Intervention    BADL Assessment/Intervention  lower body dressing;grooming  -SG           Lower Body Dressing Assessment/Training    Lower Body Dressing Hall Level  lower body dressing skills  -SG        Lower Body Dressing Position  supported sitting  -SG        Comment (Lower Body Dressing)  pt  states sits EOB and dons LE clothing. Discussed energy conservation. Pt with decreased activity tolerance   -SG           Grooming Assessment/Training    Holt Level (Grooming)  grooming skills;supervision  -SG        Grooming Position  supported sitting  -SG           BADL Safety/Performance    Impairments, BADL Safety/Performance  endurance/activity tolerance  -SG        Skilled BADL Treatment/Intervention  BADL process/adaptation training  -SG           General ROM    GENERAL ROM COMMENTS  BUE's WFL AROM  -SG           Sensory Assessment/Intervention    Sensory General Assessment  no sensation deficits identified BUE's  -SG           Positioning and Restraints    Pre-Treatment Position  sitting in chair/recliner  -SG        Post Treatment Position  chair  -SG        In Chair  sitting;call light within reach;encouraged to call for assist;with family/caregiver  -SG           Clinical Impression (OT)    OT Diagnosis  need for assist with personal care  -SG        Criteria for Skilled Therapeutic Interventions Met (OT Eval)  yes;treatment indicated  -SG        Therapy Frequency (OT Eval)  5 times/wk  -SG        Care Plan Review (OT)  evaluation/treatment results reviewed  -SG           Planned OT Interventions    Planned Therapy Interventions (OT Eval)  activity tolerance training;BADL retraining  -SG           OT Goals    Dressing Goal Selection (OT)  dressing, OT goal 1  -SG        Toileting Goal Selection (OT)  toileting, OT goal 1  -SG        Activity Tolerance Goal Selection (OT)  activity tolerance, OT goal 1  -SG        Additional Documentation  Activity Tolerance Goal Selection (OT) (Row)  -SG           Dressing Goal 1 (OT)    Activity/Assistive Device (Dressing Goal 1, OT)  dressing skills, all  -SG        [place]/Cues Needed (Dressing Goal 1, OT)  supervision required  -SG        Time Frame (Dressing Goal 1, OT)  1 week  -SG        Progress/Outcome (Dressing Goal 1, OT)  goal ongoing  -SG            Toileting Goal 1 (OT)    Activity/Device (Toileting Goal 1, OT)  toileting skills, all  -SG        [place] Level/Cues Needed (Toileting Goal 1, OT)  supervision required  -SG        Time Frame (Toileting Goal 1, OT)  1 week  -SG        Progress/Outcome (Toileting Goal 1, OT)  goal ongoing  -SG            Activity Tolerance Goal 1 (OT)    Activity Tolerance Goal 1 (OT)  increase activity tolerance for adls  -SG        Activity Level (Endurance Goal 1, OT)  20 min activity  -SG        Time Frame (Activity Tolerance Goal 1, OT)  1 week  -SG        Progress/Outcome (Activity Tolerance Goal 1, OT)  goal ongoing  -SG          User Key  (r) = Recorded By, (t) = Taken By, (c) = Cosigned By    Initials Name Effective Dates     Zoey Hale OTR 18 -          Occupational Therapy Education     Title: PT OT SLP Therapies (In Progress)     Topic: Occupational Therapy (In Progress)     Point: ADL training (Done)     Description: Instruct learner(s) on proper safety adaptation and remediation techniques during self care or transfers.   Instruct in proper use of assistive devices.    Learning Progress Summary           Patient Acceptance, E,TB,D, VU,NR by  at 2019  2:52 PM    Comment:  safety for adl. discussed energy conservation to assist with adl                               User Key     Initials Effective Dates Name Provider Type Discipline     18 -  Zoey Hale OTR Occupational Therapist OT                  OT Recommendation and Plan  Planned Therapy Interventions (OT Eval): activity tolerance training, BADL retraining  Therapy Frequency (OT Eval): 5 times/wk  Plan of Care Review  Plan of Care Reviewed With: patient  Plan of Care Reviewed With: patient  Outcome Summary: Pt presents to OT with  functional strength and endurance for adl. Pt performs sit to stand activity with SBA.  Pt states she is independent with adls PTA . Pt may benefit from OT for energy conservation  teaching to assist with adls    Outcome Measures     Row Name 09/17/19 1455             How much help from another is currently needed...    Putting on and taking off regular lower body clothing?  3  -SG      Bathing (including washing, rinsing, and drying)  3  -SG      Toileting (which includes using toilet bed pan or urinal)  3  -SG      Putting on and taking off regular upper body clothing  3  -SG      Taking care of personal grooming (such as brushing teeth)  3  -SG      Eating meals  4  -SG      AM-PAC 6 Clicks Score (OT)  19  -SG         Functional Assessment    Outcome Measure Options  AM-PAC 6 Clicks Daily Activity (OT)  -SG        User Key  (r) = Recorded By, (t) = Taken By, (c) = Cosigned By    Initials Name Provider Type    Zoey Arreguin OTR Occupational Therapist          Time Calculation:   Time Calculation- OT     Row Name 09/17/19 1455             Time Calculation- OT    OT Start Time  1409  -SG      OT Stop Time  1428  -SG      OT Time Calculation (min)  19 min  -      Total Timed Code Minutes- OT  10 minute(s)  -SG      OT Received On  09/17/19  -      OT Goal Re-Cert Due Date  09/24/19  -        User Key  (r) = Recorded By, (t) = Taken By, (c) = Cosigned By    Initials Name Provider Type    Zoey Arreguin OTR Occupational Therapist        Therapy Charges for Today     Code Description Service Date Service Provider Modifiers Qty    [id number]  OT EVAL LOW COMPLEXITY 2 9/17/2019 Zoey Hale OTR GO 1    [id number]  OT SELF CARE/MGMT/TRAIN EA 15 MIN 9/17/2019 Zoey Hale OTR GO 1               MAEVE Murray  9/17/2019

## 2019-09-17 NOTE — PLAN OF CARE
Problem: Patient Care Overview  Goal: Plan of Care Review  Outcome: Ongoing (interventions implemented as appropriate)   09/17/19 1029   Coping/Psychosocial   Plan of Care Reviewed With patient   OTHER   Outcome Summary Pt is a pleasant 83 y/o female admitted with SOA that has progressively gotten worse over last several months. The admitted with asthma excerbation with hyopoxia on room air. The patient did not need any assist for transfers and able to ambulate over 100 ft with Rwx and supervision. Pt was encouraged to walk several times with nursing and PT will check to be sure pt is bneing mobilized as needed to be able to return to her indep apartment.

## 2019-09-17 NOTE — PROGRESS NOTES
Discharge Planning Assessment  [facility]     Patient Name: Caitlyn Longoria  MRN: [id number]  Today's Date: 9/17/2019    Admit Date: 9/16/2019    Discharge Needs Assessment     Row Name 09/17/19 1311       Living Environment    Lives With  alone    Current Living Arrangements  home/apartment/condo [facility]    Primary Care Provided by  self    Provides Primary Care For  no one    Family Caregiver if Needed  none    Quality of Family Relationships  helpful;involved;supportive    Able to Return to Prior Arrangements  yes       Resource/Environmental Concerns    Resource/Environmental Concerns  none    Transportation Concerns  car, none       Transition Planning    Patient/Family Anticipates Transition to  home with help/services;inpatient rehabilitation facility    Patient/Family Anticipated Services at Transition  home health care;rehabilitation services    Transportation Anticipated  family or friend will provide       Discharge Needs Assessment    Readmission Within the Last 30 Days  no previous admission in last 30 days    Concerns to be Addressed  basic needs    Equipment Currently Used at Home  nebulizer;walker, rolling;bath bench;grab bar    Anticipated Changes Related to Illness  inability to care for self    Equipment Needed After Discharge  none    Outpatient/Agency/Support Group Needs  homecare agency;skilled nursing facility    Discharge Facility/Level of Care Needs  home with home health;nursing facility, skilled    Offered/Gave Vendor List  no    Current Discharge Risk  lives alone        Discharge Plan     Row Name 09/17/19 1317       Plan    Plan Comments  Introduced self and explained role of CCP, IMM checked and facesheet verified with patient at chairside and she states her MPOA are her brother, Trae Longoria([phone]) and her niece, Radha Adair([phone]) and they are her emergency contacts.  Patient states she lives alone in Independent Living  apartment at [facility] and is independent with medications, bathing, meals and drives and [facility] provides housekeeping and meals and a bus for transportation, if she decides to use it.  Patient states she currently has a nebulizer, rolling walker and a handicap bathroom and denies any DME needs at discharge.  Patient states her PCP is Zenaida Suarez MD and she uses [facility] at [address] [place] [address] and denies any issues with affording or obtaining medications.  Patient states she has used [facility] in the past and wants to use them at discharge([place] notified) if she returns home and has been to [place]/ for rehab in the past and wants to go there at discharge, if she needs to(Latonya notified to follow patient)- patient states she wants to see how she progresses before she decides.  NieceRadha states either she or patient brotherTrae will provide transportation for patient at discharge.  CCP will follow and assist as needed.... Cassidy Vicente RN,CCP     Row Name 09/17/19 3333       Plan    Plan  Home with [facility] vs [place] for rehab-- CCP will follow    Patient/Family in Agreement with Plan  yes        Destination      Service Provider Request Status Selected Services Address Phone Number Fax Number    [facility] Pending - Request Sent N/A [address] [facility] [address] [phone]      Durable Medical Equipment      No service coordination in this encounter.      Dialysis/Infusion      No service coordination in this encounter.      Home Medical Care      No service coordination in this encounter.      Therapy      No service coordination in this encounter.      Community Resources      No service coordination in this encounter.          Demographic Summary     Row Name 09/17/19 9208       General Information    Admission Type  inpatient    Arrived From  home    Referral Source  nursing     Reason for Consult  discharge planning    Preferred Language  English    Row Name 09/17/19 1311       General Information    Admission Type  inpatient    Arrived From  home    Reason for Consult  discharge planning    Preferred Language  English       Contact Information    Permission Granted to Share Info With  ;family/designee Radha Adair(niece)         Functional Status     Row Name 09/17/19 1311       Functional Status    Usual Activity Tolerance  moderate    Current Activity Tolerance  fair       Functional Status, IADL    Medications  independent    Meal Preparation  independent    Housekeeping  assistive person    Laundry  assistive person    Shopping  assistive person       Mental Status    General Appearance WDL  WDL       Mental Status Summary    Recent Changes in Mental Status/Cognitive Functioning  no changes        Psychosocial    No documentation.       Abuse/Neglect    No documentation.       Legal    No documentation.       Substance Abuse    No documentation.       Patient Forms    No documentation.           Cassidy Vicente RN

## 2019-09-17 NOTE — PROGRESS NOTES
Pharmacy Consult: Warfarin Dosing/ Monitoring    Caitlyn Longoria is a 84 y.o. female, who has a past medical history of Aortic calcification (CMS/[facility]), Aortic regurgitation, CAD (coronary artery disease), Chronic combined systolic and diastolic congestive heart failure (CMS/[facility]), CKD (chronic kidney disease) stage 3, GFR 30-59 ml/min (CMS/[facility]), Coronary artery disease involving native coronary artery of native heart with angina pectoris (CMS/[facility]), DM type 2 (diabetes mellitus, type 2) (CMS/[facility]), Hypertension, Mild mitral regurgitation, Mitral annular calcification, PAF (paroxysmal atrial fibrillation) (CMS/[facility]), SSS (sick sinus syndrome) (CMS/[facility]), and Tricuspid regurgitation.    Social History     Tobacco Use   • Smoking status: Never Smoker   • Smokeless tobacco: Never Used   • Tobacco comment: caffeine use   Substance Use Topics   • Alcohol use: No   • Drug use: No     Results from last 7 days   Lab Units 09/17/19  0509 09/16/19  1956 09/16/19  1207   INR  2.54* 2.64* 2.50*   HEMOGLOBIN g/dL 12.1  --  13.9   HEMATOCRIT % 39.0  --  42.8   PLATELETS 10*3/mm3 152  --  165     Results from last 7 days   Lab Units 09/17/19  0509 09/16/19  1207   SODIUM mmol/L 142 142   POTASSIUM mmol/L 3.5 3.7   CHLORIDE mmol/L 105 102   CO2 mmol/L 23.9 24.6   BUN mg/dL 20 22   CREATININE mg/dL 1.05* 1.12*   CALCIUM mg/dL 8.3* 9.0   BILIRUBIN mg/dL  --  0.8   ALK PHOS U/L  --  81   ALT (SGPT) U/L  --  19   AST (SGOT) U/L  --  25   GLUCOSE mg/dL 114* 92     Anticoagulation history: Warfarin therapy managed by  Anticoagulation Clinic since September of last year. Last visit on 8/28/19, maintained regimen of Warfarin 2mg Tues and Saturday, Warfarin 4mg all other days. INR has been in range of 2-3 on this regimen each of the previous visits to clinic since February    [facility] Anticoagulation:  Consulting provider: Dr. La  Start date: 9/16/19  Indication: Paroxysmal Atrial Fibrillation  Target INR: 2-3  Expected duration:  Lifelong  Bridge Therapy: None                Date 9/16 9/17           INR 2.5/  2.64 2.54           Warfarin dose 4 mg 2 mg             Potential drug interactions:   1. Cephalexin (major)-concurrent use with warfarin can increase risk of bleed due to disruption of vitamin K synthesis  2. Protonix (moderate)-concurrent use with warfarin inhibits [id number] warfarin metabolism, and may result in increased INR and increased risk of bleed.  3. Rosuvastatin (moderate)-concurrent use with warfarin can increase risk of bleed.     Relevant nutrition status: Regular carb consistent diet    Education complete: Yes/ Date: 9/17/19    Assessment/Plan:  Dose: 2 mg tonight (home regimen)  Monitor: Daily PT/INR  Follow up: 9/18/19    Pharmacy will continue to follow until discharge or discontinuation of warfarin.   Joseluis Pena.D., BCPS  9/17/2019

## 2019-09-17 NOTE — DISCHARGE PLACEMENT REQUEST
"Delfin Longoria (84 y.o. Female)     Date of Birth Social Security Number Address Home Phone MRN    [id number]  [id number]  [place] [address] [phone] [id number]    Christianity Marital Status          Amish Single       Admission Date Admission Type Admitting Provider Attending Provider Department, Room/Bed    9/16/19 Emergency Dania La MD Sayied, Tausif, MD [address], [address]    Discharge Date Discharge Disposition Discharge Destination                       Attending Provider:  Dania La MD    Allergies:  Accupril [Quinapril Hcl], Ahist [Chlorpheniramine], Chlorcyclizine, Clarithromycin, Diclofenac Sodium, Diphenhydramine, Esomeprazole, Ibuprofen, Levalbuterol, Levocetirizine, Lipitor [Atorvastatin], Lodine [Etodolac], Omeprazole, Pravachol [Pravastatin], Quinapril, Sulfa Antibiotics, Sulindac, Valdecoxib, Prednisone    Isolation:  None   Infection:  None   Code Status:  CPR    Ht:  162.6 cm (64.02\")   Wt:  104 kg (229 lb 8 oz)    Admission Cmt:  None   Principal Problem:  None                Active Insurance as of 9/16/2019     Primary Coverage     Payor Plan Insurance Group Employer/Plan Group    MEDICARE MEDICARE A & B      Payor Plan Address Payor Plan Phone Number Payor Plan Fax Number Effective Dates    [address] [phone]  10/1/1999 - None Entered    [facility] [id number]       Subscriber Name Subscriber Birth Date Member ID       DELFIN LONGORIA 1934 [id number]           Secondary Coverage     Payor Plan Insurance Group Employer/Plan Group    AARP MED SUPP AARP HEALTH CARE OPTIONS PLAN F     Payor Plan Address Payor Plan Phone Number Payor Plan Fax Number Effective Dates    [facility] [phone]  1/1/2015 - None Entered    [address]       [facility] [address]       Subscriber Name Subscriber Birth Date Member ID       DELFIN LONGORIA 1934 [id number]                 Emergency Contacts     Contact " Person (Rel.) Home Phone Work Phone Mobile Phone    Inez Longoria (Sister) [phone] -- --    Trae Longoria (Brother) [phone] -- [phone]    Zoey Adair (Relative) [phone] -- --    Rita Adair (Relative) -- -- --

## 2019-09-17 NOTE — PROGRESS NOTES
"[facility] PULMONARY CARE         Dr Najera Sayied   LOS: 1 day   Patient Care Team:  Zenaida Suarez MD as PCP - General (Internal Medicine)  Zenaida Suarez MD as PCP - Claims Attributed  Pao Levi [facility] as Pharmacist    Chief Complaint: Acute hypoxemia with suspected asthma exacerbation GERD elevated left hemidiaphragm multiple medical problems    Interval History: Not much better today.  Still short of breath with minimal exertion.  Cough and congestion ongoing.    REVIEW OF SYSTEMS:   CARDIOVASCULAR: No chest pain, chest pressure or chest discomfort. No palpitations or edema.   RESPIRATORY: Shortness of breath with minimal exertion with cough and congestion ongoing  GASTROINTESTINAL: No anorexia, nausea, vomiting or diarrhea. No abdominal pain or blood.   HEMATOLOGIC: No bleeding or bruising.     Ventilator/Non-Invasive Ventilation Settings (From admission, onward)    None            Vital Signs  Temp:  [97.6 °F (36.4 °C)-99 °F (37.2 °C)] 97.6 °F (36.4 °C)  Heart Rate:  [79-95] 80  Resp:  [18-24] 18  BP: (105-152)/() 120/82    Intake/Output Summary (Last 24 hours) at 9/17/2019 0955  Last data filed at 9/17/2019 0628  Gross per 24 hour   Intake 300 ml   Output 1300 ml   Net -1000 ml     Flowsheet Rows      First Filed Value   Admission Height  162.6 cm (64\") Documented at 09/16/2019 1205   Admission Weight  106 kg (233 lb) Documented at 09/16/2019 1205          Physical Exam:   General Appearance:    Alert, cooperative, in no acute distress   Lungs:    Diminished breath sounds with rhonchi wheezing bilaterally on the basis    Heart:    Regular rhythm and normal rate, normal S1 and S2, no            murmur, no gallop, no rub, no click   Chest Wall:    No abnormalities observed   Abdomen:     Normal bowel sounds, no masses, no organomegaly, soft        non-tender, non-distended, no guarding, no rebound                tenderness   Extremities:   Moves all extremities well, no " edema, no cyanosis, no             redness     Results Review:        Results from last 7 days   Lab Units 09/17/19  0509 09/16/19  1207   SODIUM mmol/L 142 142   POTASSIUM mmol/L 3.5 3.7   CHLORIDE mmol/L 105 102   CO2 mmol/L 23.9 24.6   BUN mg/dL 20 22   CREATININE mg/dL 1.05* 1.12*   GLUCOSE mg/dL 114* 92   CALCIUM mg/dL 8.3* 9.0     Results from last 7 days   Lab Units 09/16/19  1207   TROPONIN T ng/mL <0.010     Results from last 7 days   Lab Units 09/17/19  0509 09/16/19  1207   WBC 10*3/mm3 9.75 12.92*   HEMOGLOBIN g/dL 12.1 13.9   HEMATOCRIT % 39.0 42.8   PLATELETS 10*3/mm3 152 165     Results from last 7 days   Lab Units 09/17/19  0509 09/16/19  1956 09/16/19  1207   INR  2.54* 2.64* 2.50*                 Results from last 7 days   Lab Units 09/16/19  1707   PH, ARTERIAL pH units 7.495*   PO2 ART mm Hg 53.5*   PCO2, ARTERIAL mm Hg 30.0*   HCO3 ART mmol/L 23.1       I reviewed the patient's new clinical results.  I personally viewed and interpreted the patient's CXR        Medication Review:     aspirin 81 mg Oral Daily   budesonide 0.5 mg Nebulization Daily - RT   carvedilol 3.125 mg Oral BID   cephalexin 500 mg Oral Q8H   [START ON 9/18/2019] furosemide 40 mg Oral QAM   glipiZIDE 2.5 mg Oral QAM   influenza vaccine 0.5 mL Intramuscular Once   ipratropium-albuterol 3 mL Nebulization 4x Daily - RT   loperamide 2 mg Oral Daily   losartan 25 mg Oral Daily   methylPREDNISolone sodium succinate 40 mg Intravenous Q8H   oxybutynin XL 10 mg Oral Nightly   pantoprazole 40 mg Oral BID   rosuvastatin 20 mg Oral Nightly   sodium chloride 10 mL Intravenous Q12H   sodium chloride 10 mL Intravenous Q12H   warfarin 2 mg Oral Once per day on Tue Sat   warfarin 4 mg Oral Once per day on Sun Mon Wed Thu Fri         Pharmacy to dose warfarin        ASSESSMENT:   Acute hypoxemia  Asthma exacerbation  GERD suspected hiatal hernia  Elevated left hemidiaphragm  Paroxysmal A. fib  Diabetes mellitus  Hypertension  History of  CVA  History of systolic and diastolic CHF  CHARLENE on CPAP  Morbid obesity  History of septic knee       PLAN:  Not much improved today  Although she remains on room air but still tight wheezing.  I think primary process may be asthma exacerbation.  I will go ahead and add Solu-Medrol there was some concerns about allergy but patient really has not had Solu-Medrol she tells me.  I will get ID to see her for her septic knee  Ambulate today  Physical therapy  Bronchodilators  Increased dose of Lasix.  Hold off cardiology consult for now.  If no improvement with shortness of breath may ask cardiology to see down the road.    Dania La MD  09/17/19  9:55 AM

## 2019-09-17 NOTE — PLAN OF CARE
Problem: Patient Care Overview  Goal: Plan of Care Review  Outcome: Ongoing (interventions implemented as appropriate)   09/17/19 4883   Coping/Psychosocial   Plan of Care Reviewed With patient   Plan of Care Review   Progress improving   OTHER   Outcome Summary Pt is cont to imporove with her breathing. Pt walked twice today around nurses station and and only got short winded at the end of the walk. Pt cont on O2 at 2 liters. Pt is a stand by assist. Will cont to monitor.       Problem: Fall Risk (Adult)  Goal: Identify Related Risk Factors and Signs and Symptoms  Outcome: Ongoing (interventions implemented as appropriate)    Goal: Absence of Fall  Outcome: Ongoing (interventions implemented as appropriate)      Problem: ARDS (Acute Resp Distress Syndrome) (Adult)  Goal: Signs and Symptoms of Listed Potential Problems Will be Absent, Minimized or Managed (ARDS)  Outcome: Ongoing (interventions implemented as appropriate)      Problem: Pain, Acute (Adult)  Goal: Identify Related Risk Factors and Signs and Symptoms  Outcome: Ongoing (interventions implemented as appropriate)    Goal: Acceptable Pain Control/Comfort Level  Outcome: Ongoing (interventions implemented as appropriate)

## 2019-09-17 NOTE — CONSULTS
Referring Provider: Dania La [place] [address]    Reason for Consultation: knee infection fup    History of present illness:  Caitlyn Longoria is a 84 y.o. who I am asked to evaluate and give opinion for knee infection fup. 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 She previously became reinfected when she stopped her suppressive doxycycline in the past. She now follows with me in clinic yearly for monitoring. She has not had any issues with the cefadroxil. The right knee is doing well.     She was admitted yesterday for shortness of air, cough, and wheezing. No alleviating factors. No associated fevers. CTA negative for PE and infiltrate. She has been started on steroids and bronchodilators per the admitting pulmonary team.     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 Sulfa  2. Clarithromycin - headache    Medications:    Current Facility-Administered Medications:   •  acetaminophen (TYLENOL) tablet 650 mg, 650 mg, Oral, Q6H PRN, Dania La MD, 650 mg at 09/17/19 0914  •  aspirin EC tablet 81 mg, 81 mg, Oral, Daily, Dania La MD, 81 mg at 09/17/19 0814  •  budesonide (PULMICORT) nebulizer solution 0.5 mg, 0.5 mg, Nebulization, Daily - RT, Abhinav  MD Dania, 0.5 mg at 09/17/19 0809  •  carvedilol (COREG) tablet 3.125 mg, 3.125 mg, Oral, BID, Dania La MD, 3.125 mg at 09/17/19 0814  •  cephalexin (KEFLEX) capsule 250 mg, 250 mg, Oral, Q8H, Dania La MD, 250 mg at 09/17/19 0641  •  [START ON 9/18/2019] furosemide (LASIX) tablet 40 mg, 40 mg, Oral, QAM, Dania La MD  •  glipiZIDE (GLUCOTROL) tablet 2.5 mg, 2.5 mg, Oral, QAM, Dania La MD, 2.5 mg at 09/17/19 0639  •  Influenza Vac Subunit Quad (FLUCELVAX) injection 0.5 mL, 0.5 mL, Intramuscular, Once, Dania La MD  •  ipratropium-albuterol (DUO-NEB) nebulizer solution 3 mL, 3 mL, Nebulization, 4x Daily - RT, Dania La MD, 3 mL at 09/17/19 0809  •  loperamide (IMODIUM) capsule 2 mg, 2 mg, Oral, Daily, Dania La MD, 2 mg at 09/17/19 0814  •  losartan (COZAAR) tablet 25 mg, 25 mg, Oral, Daily, Dania La MD, 25 mg at 09/17/19 0814  •  methylPREDNISolone sodium succinate (SOLU-Medrol) injection 40 mg, 40 mg, Intravenous, Q8H, Dania La MD  •  oxybutynin XL (DITROPAN-XL) 24 hr tablet 10 mg, 10 mg, Oral, Nightly, Dania La MD, 10 mg at 09/16/19 2202  •  pantoprazole (PROTONIX) EC tablet 40 mg, 40 mg, Oral, BID, Dania La MD, 40 mg at 09/17/19 0814  •  Pharmacy to dose warfarin, , Does not apply, Continuous PRN, Dania La MD  •  rosuvastatin (CRESTOR) tablet 20 mg, 20 mg, Oral, Nightly, Dania La MD, 20 mg at 09/16/19 2202  •  sodium chloride 0.9 % flush 10 mL, 10 mL, Intravenous, PRN, Gabino Lipscomb MD  •  sodium chloride 0.9 % flush 10 mL, 10 mL, Intravenous, Q12H, Dania La MD, 10 mL at 09/17/19 0814  •  sodium chloride 0.9 % flush 10 mL, 10 mL, Intravenous, PRN, Dania La MD  •  sodium chloride 0.9 % flush 10 mL, 10 mL, Intravenous, Q12H, Dania La MD, 10 mL at 09/17/19 0815  •  sodium chloride 0.9 % flush 10 mL, 10 mL, Intravenous, PRN, Dania La MD  •  warfarin (COUMADIN) tablet 2 mg, 2 mg, Oral, Once per day  on Tue Sat, Dania La MD  •  warfarin (COUMADIN) tablet 4 mg, 4 mg, Oral, Once per day on Sun Mon Wed Thu Fri, Dania La MD, 4 mg at 09/16/19 2202    Review of Systems  All systems were reviewed and are negative unless otherwise stated above in the HPI    Objective   Vital Signs   Temp:  [97.6 °F (36.4 °C)-99 °F (37.2 °C)] 97.6 °F (36.4 °C)  Heart Rate:  [79-95] 80  Resp:  [18-24] 18  BP: (105-152)/() 120/82    Physical Exam:   General: awake, alert, NAD  Head: Normocephalic  Eyes: no scleral icterus  ENT: MMM  Cardiovascular: NR, RR, 1+ LE edema  Respiratory: rhonchi and B wheezing  GI: Abdomen is obese  Musculoskeletal: B knee incisions well-healed; no erythema or heat  Skin: No rashes  Neurological: Alert and oriented x 3  Psychiatric: Normal mood and affect     Labs:     Lab Results   Component Value Date    WBC 9.75 09/17/2019    HGB 12.1 09/17/2019    HCT 39.0 09/17/2019    MCV 98.2 (H) 09/17/2019     09/17/2019       Lab Results   Component Value Date    GLUCOSE 114 (H) 09/17/2019    BUN 20 09/17/2019    CREATININE 1.05 (H) 09/17/2019    EGFRIFNONA 50 (L) 09/17/2019    BCR 19.0 09/17/2019    CO2 23.9 09/17/2019    CALCIUM 8.3 (L) 09/17/2019    PROTENTOTREF 4.9 (L) 04/08/2014    ALBUMIN 4.40 09/16/2019    LABIL2 1.7 03/13/2018    AST 25 09/16/2019    ALT 19 09/16/2019       Microbiology:  9/16 BCx: pending     Radiology (personally reviewed report):  CTA chest negative for PE; lungs are clear except for calcified granuloma per radiology; no pericardial effusion    Assessment/Plan   1. 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 Paroxysmal atrial fibrillation on coumadin    3. Shortness of breath with hypoxia likely due to asthma/COPD exacerbation versus elevated left hemidaphragm and hiatal hernia  -on steroids and bronchodilators per pulmonary team  -CTA negative for PE and infiltrate    Thank you for allowing me to be involved in the care of this patient. Infectious diseases will sign off at this time with antibiotics plan in place but please call me at [phone] if any further questions.

## 2019-09-17 NOTE — PLAN OF CARE
Problem: Patient Care Overview  Goal: Plan of Care Review  Outcome: Ongoing (interventions implemented as appropriate)   19 [id number]   Coping/Psychosocial   Plan of Care Reviewed With patient   OTHER   Outcome Summary Pt presents to OT with  functional strength and endurance for adl. Pt performs sit to stand activity with SBA. Pt states she is independent with adls PTA . Pt may benefit from OT for energy conservation teaching to assist with adls

## 2019-09-18 LAB
ANION GAP SERPL CALCULATED.3IONS-SCNC: 12.6 MMOL/L (ref 5–15)
BUN BLD-MCNC: 18 MG/DL (ref 8–23)
BUN/CREAT SERPL: 20 (ref 7–25)
CALCIUM SPEC-SCNC: 8.5 MG/DL (ref 8.6–10.5)
CHLORIDE SERPL-SCNC: 101 MMOL/L (ref 98–107)
CO2 SERPL-SCNC: 24.4 MMOL/L (ref 22–29)
CREAT BLD-MCNC: 0.9 MG/DL (ref 0.57–1)
GFR SERPL CREATININE-BSD FRML MDRD: 60 ML/MIN/1.73
GLUCOSE BLD-MCNC: 245 MG/DL (ref 65–99)
INR PPP: 2.45 (ref 0.9–1.1)
POTASSIUM BLD-SCNC: 3.7 MMOL/L (ref 3.5–5.2)
PROTHROMBIN TIME: 26.2 SECONDS (ref 11.7–14.2)
SODIUM BLD-SCNC: 138 MMOL/L (ref 136–145)

## 2019-09-18 PROCEDURE — 85610 PROTHROMBIN TIME: CPT | Performed by: INTERNAL MEDICINE

## 2019-09-18 PROCEDURE — 25010000002 METHYLPREDNISOLONE PER 40 MG: Performed by: INTERNAL MEDICINE

## 2019-09-18 PROCEDURE — 94799 UNLISTED PULMONARY SVC/PX: CPT

## 2019-09-18 PROCEDURE — 97535 SELF CARE MNGMENT TRAINING: CPT

## 2019-09-18 PROCEDURE — 97110 THERAPEUTIC EXERCISES: CPT

## 2019-09-18 PROCEDURE — 80048 BASIC METABOLIC PNL TOTAL CA: CPT | Performed by: INTERNAL MEDICINE

## 2019-09-18 RX ADMIN — CARVEDILOL 3.12 MG: 3.12 TABLET, FILM COATED ORAL at 08:46

## 2019-09-18 RX ADMIN — IPRATROPIUM BROMIDE AND ALBUTEROL SULFATE 3 ML: 2.5; .5 SOLUTION RESPIRATORY (INHALATION) at 11:48

## 2019-09-18 RX ADMIN — ASPIRIN 81 MG: 81 TABLET, COATED ORAL at 08:46

## 2019-09-18 RX ADMIN — IPRATROPIUM BROMIDE AND ALBUTEROL SULFATE 3 ML: 2.5; .5 SOLUTION RESPIRATORY (INHALATION) at 21:35

## 2019-09-18 RX ADMIN — GLIPIZIDE 2.5 MG: 5 TABLET ORAL at 10:34

## 2019-09-18 RX ADMIN — SODIUM CHLORIDE, PRESERVATIVE FREE 10 ML: 5 INJECTION INTRAVENOUS at 20:21

## 2019-09-18 RX ADMIN — CEPHALEXIN 500 MG: 500 CAPSULE ORAL at 07:00

## 2019-09-18 RX ADMIN — METHYLPREDNISOLONE SODIUM SUCCINATE 40 MG: 40 INJECTION, POWDER, FOR SOLUTION INTRAMUSCULAR; INTRAVENOUS at 14:58

## 2019-09-18 RX ADMIN — CARVEDILOL 3.12 MG: 3.12 TABLET, FILM COATED ORAL at 20:19

## 2019-09-18 RX ADMIN — HYDROCODONE BITARTRATE AND HOMATROPINE METHYLBROMIDE 5 ML: 5; 1.5 SOLUTION ORAL at 11:25

## 2019-09-18 RX ADMIN — FUROSEMIDE 40 MG: 40 TABLET ORAL at 07:00

## 2019-09-18 RX ADMIN — WARFARIN SODIUM 4 MG: 4 TABLET ORAL at 17:38

## 2019-09-18 RX ADMIN — IPRATROPIUM BROMIDE AND ALBUTEROL SULFATE 3 ML: 2.5; .5 SOLUTION RESPIRATORY (INHALATION) at 07:57

## 2019-09-18 RX ADMIN — CEPHALEXIN 500 MG: 500 CAPSULE ORAL at 14:58

## 2019-09-18 RX ADMIN — CEPHALEXIN 500 MG: 500 CAPSULE ORAL at 20:22

## 2019-09-18 RX ADMIN — PANTOPRAZOLE SODIUM 40 MG: 40 TABLET, DELAYED RELEASE ORAL at 08:46

## 2019-09-18 RX ADMIN — METHYLPREDNISOLONE SODIUM SUCCINATE 40 MG: 40 INJECTION, POWDER, FOR SOLUTION INTRAMUSCULAR; INTRAVENOUS at 20:22

## 2019-09-18 RX ADMIN — PANTOPRAZOLE SODIUM 40 MG: 40 TABLET, DELAYED RELEASE ORAL at 20:20

## 2019-09-18 RX ADMIN — METHYLPREDNISOLONE SODIUM SUCCINATE 40 MG: 40 INJECTION, POWDER, FOR SOLUTION INTRAMUSCULAR; INTRAVENOUS at 07:02

## 2019-09-18 RX ADMIN — LOPERAMIDE HYDROCHLORIDE 2 MG: 2 CAPSULE ORAL at 08:46

## 2019-09-18 RX ADMIN — IPRATROPIUM BROMIDE AND ALBUTEROL SULFATE 3 ML: 2.5; .5 SOLUTION RESPIRATORY (INHALATION) at 15:35

## 2019-09-18 RX ADMIN — LOSARTAN POTASSIUM 25 MG: 25 TABLET, FILM COATED ORAL at 10:34

## 2019-09-18 RX ADMIN — SODIUM CHLORIDE, PRESERVATIVE FREE 10 ML: 5 INJECTION INTRAVENOUS at 08:46

## 2019-09-18 RX ADMIN — HYDROCODONE BITARTRATE AND HOMATROPINE METHYLBROMIDE 5 ML: 5; 1.5 SOLUTION ORAL at 17:38

## 2019-09-18 RX ADMIN — BUDESONIDE 0.5 MG: 0.5 SUSPENSION RESPIRATORY (INHALATION) at 07:57

## 2019-09-18 RX ADMIN — OXYBUTYNIN 10 MG: 10 TABLET, FILM COATED, EXTENDED RELEASE ORAL at 20:19

## 2019-09-18 RX ADMIN — HYDROCODONE BITARTRATE AND HOMATROPINE METHYLBROMIDE 5 ML: 5; 1.5 SOLUTION ORAL at 23:36

## 2019-09-18 RX ADMIN — ROSUVASTATIN CALCIUM 20 MG: 20 TABLET, FILM COATED ORAL at 20:19

## 2019-09-18 NOTE — PROGRESS NOTES
Pharmacy Consult: Warfarin Dosing/ Monitoring    Caitlyn Longoria is a 84 y.o. female, who has a past medical history of Aortic calcification (CMS/[facility]), Aortic regurgitation, CAD (coronary artery disease), Chronic combined systolic and diastolic congestive heart failure (CMS/[facility]), CKD (chronic kidney disease) stage 3, GFR 30-59 ml/min (CMS/[facility]), Coronary artery disease involving native coronary artery of native heart with angina pectoris (CMS/[facility]), DM type 2 (diabetes mellitus, type 2) (CMS/[facility]), Hypertension, Mild mitral regurgitation, Mitral annular calcification, PAF (paroxysmal atrial fibrillation) (CMS/[facility]), SSS (sick sinus syndrome) (CMS/[facility]), and Tricuspid regurgitation.    Social History     Tobacco Use   • Smoking status: Never Smoker   • Smokeless tobacco: Never Used   • Tobacco comment: caffeine use   Substance Use Topics   • Alcohol use: No   • Drug use: No     Results from last 7 days   Lab Units 09/18/19  0434 09/17/19  0509 09/16/19  1956 09/16/19  1207   INR  2.45* 2.54* 2.64* 2.50*   HEMOGLOBIN g/dL  --  12.1  --  13.9   HEMATOCRIT %  --  39.0  --  42.8   PLATELETS 10*3/mm3  --  152  --  165     Results from last 7 days   Lab Units 09/18/19  0434 09/17/19  0509 09/16/19  1207   SODIUM mmol/L 138 142 142   POTASSIUM mmol/L 3.7 3.5 3.7   CHLORIDE mmol/L 101 105 102   CO2 mmol/L 24.4 23.9 24.6   BUN mg/dL 18 20 22   CREATININE mg/dL 0.90 1.05* 1.12*   CALCIUM mg/dL 8.5* 8.3* 9.0   BILIRUBIN mg/dL  --   --  0.8   ALK PHOS U/L  --   --  81   ALT (SGPT) U/L  --   --  19   AST (SGOT) U/L  --   --  25   GLUCOSE mg/dL 245* 114* 92     Anticoagulation history: Warfarin therapy managed by  Anticoagulation Clinic since September of last year. Last visit on 8/28/19, maintained regimen of Warfarin 2mg Tues and Saturday, Warfarin 4mg all other days. INR has been in range of 2-3 on this regimen each of the previous visits to clinic since February    [facility] Anticoagulation:  Consulting provider:   Sayied  Start date: 9/16/19  Indication: Paroxysmal Atrial Fibrillation  Target INR: 2-3  Expected duration: Lifelong  Bridge Therapy: None                Date 9/16 9/17 9/18          INR 2.5/  2.64 2.54 2.45          Warfarin dose 4 mg 2 mg 4 mg            Potential drug interactions:   1. Cephalexin (major)-concurrent use with warfarin can increase risk of bleed due to disruption of vitamin K synthesis  2. Protonix (moderate)-concurrent use with warfarin inhibits [id number] warfarin metabolism, and may result in increased INR and increased risk of bleed.  3. Rosuvastatin (moderate)-concurrent use with warfarin can increase risk of bleed.     Relevant nutrition status: Regular carb consistent diet    Education complete: Yes/ Date: 9/17/19    Assessment/Plan:  Dose: Continue on home regimen (4 mg tonight) since INR is stable  Monitor: Daily PT/INR  Follow up: 9/19/19    Pharmacy will continue to follow until discharge or discontinuation of warfarin.   Kristel Sales Pharm.D., BCPS  9/18/2019

## 2019-09-18 NOTE — THERAPY TREATMENT NOTE
Patient Name: Caitlyn Longoria  : 1934    MRN: [id number]                              Today's Date: 2019       Admit Date: 2019    Visit Dx:     ICD-10-CM ICD-9-CM   1. Acute respiratory failure with hypoxia (CMS/[facility]) J96.01 518.81     Patient Active Problem List   Diagnosis   • Neck and shoulder pain   • Arthropathy of shoulder region   • Carpal tunnel syndrome of left wrist   • Chronic pain of both shoulders   • Chronic left shoulder pain   • Arthropathy of left shoulder   • Dysarthria   • DM type 2 (diabetes mellitus, type 2) (CMS/HCC)   • PAF (paroxysmal atrial fibrillation) (CMS/[facility])   • HLD (hyperlipidemia)   • Bronchitis   • Coronary artery disease involving native coronary artery of native heart with angina pectoris (CMS/HCC)   • CVA (cerebral vascular accident) (CMS/[facility])   • HTN (hypertension)   • CKD (chronic kidney disease) stage 3, GFR 30-59 ml/min (CMS/[facility])   • Chronic combined systolic and diastolic congestive heart failure (CMS/[facility])   • Infection of prosthetic right knee joint (CMS/[facility])   • Stasis dermatitis of right lower extremity due to peripheral venous hypertension   • Current use of long term anticoagulation   • Morbidly obese (CMS/[facility])   • Acute respiratory failure with hypoxia (CMS/[facility])     Past Medical History:   Diagnosis Date   • Aortic calcification (CMS/HCC)     mild, on echo 2017   • Aortic regurgitation     Trace   • CAD (coronary artery disease)    • Chronic combined systolic and diastolic congestive heart failure (CMS/[facility])    • CKD (chronic kidney disease) stage 3, GFR 30-59 ml/min (CMS/[facility])    • Coronary artery disease involving native coronary artery of native heart with angina pectoris (CMS/HCC)    • DM type 2 (diabetes mellitus, type 2) (CMS/HCC)    • Hypertension    • Mild mitral regurgitation    • Mitral annular calcification     2017- echo, moderate   • PAF (paroxysmal atrial fibrillation) (CMS/HCC)    • SSS (sick sinus syndrome) (CMS/[facility])    •  Tricuspid regurgitation     Trace     Past Surgical History:   Procedure Laterality Date   • CARDIAC CATHETERIZATION     • CHOLECYSTECTOMY     • CORONARY STENT PLACEMENT     • HERNIA REPAIR     • HYSTERECTOMY     • PACEMAKER IMPLANTATION     • REPLACEMENT TOTAL KNEE       General Information     Row Name 09/18/19 1108          PT Evaluation Time/Intention    Document Type  therapy note (daily note)  -AR     Mode of Treatment  physical therapy  -AR     Row Name 09/18/19 1108          Relationship/Environment    Lives With  alone  -AR     Row Name 09/18/19 1108          Resource/Environmental Concerns    Current Living Arrangements  independent/assisted living facility  -AR     Row Name 09/18/19 1108          Cognitive Assessment/Intervention- PT/OT    Orientation Status (Cognition)  oriented x 4  -AR       User Key  (r) = Recorded By, (t) = Taken By, (c) = Cosigned By    Initials Name Provider Type    AR Leonor Shabazz PT Physical Therapist        Mobility     Row Name 09/18/19 1109          Bed Mobility Assessment/Treatment    Supine-Sit-Supine [place] (Bed Mobility)  not tested  -AR     Row Name 09/18/19 1109          Sit-Stand Transfer    Sit-Stand [place] (Transfers)  conditional independence  -AR     Assistive Device (Sit-Stand Transfers)  walker, front-wheeled  -AR     Row Name 09/18/19 1109          Gait/Stairs Assessment/Training    Gait/Stairs Assessment/Training  gait/ambulation independence  -AR     [place] Level (Gait)  conditional independence  -AR     Assistive Device (Gait)  walker, front-wheeled  -AR     Distance in Feet (Gait)  300  -AR     Pattern (Gait)  swing-through  -AR     Deviations/Abnormal Patterns (Gait)  festinating/shuffling  -AR     Bilateral Gait Deviations  forward flexed posture  -AR     Comment (Gait/Stairs)  no LOB or safety concerns. monitored 02 throughout walk maintained >90%  -AR       User Key  (r) = Recorded By, (t) = Taken By, (c) = Cosigned By    Initials  Name Provider Type    AR Leonor Shabazz, PT Physical Therapist        Obj/Interventions     Row Name 09/18/19 1112          Static Sitting Balance    Level of [place] (Unsupported Sitting, Static Balance)  independent  -AR     Time Able to Maintain Position (Unsupported Sitting, Static Balance)  2 to 3 minutes  -AR       User Key  (r) = Recorded By, (t) = Taken By, (c) = Cosigned By    Initials Name Provider Type    AR Leonor Shabazz, PT Physical Therapist        Goals/Plan    No documentation.       Clinical Impression     Row Name 09/18/19 1112          Pain Assessment    Additional Documentation  Pain Scale: Numbers Pre/Post-Treatment (Group)  -AR     Row Name 09/18/19 1112          Pain Scale: Numbers Pre/Post-Treatment    Pain Scale: Numbers, Pretreatment  0/10 - no pain  -AR     Pain Scale: Numbers, Post-Treatment  0/10 - no pain  -AR     Pain Intervention(s)  Repositioned  -AR     Row Name 09/18/19 1112          Plan of Care Review    Plan of Care Reviewed With  patient  -AR     Row Name 09/18/19 1112          Vital Signs    Pre SpO2 (%)  92  -AR     O2 Delivery Pre Treatment  room air  -AR     Intra SpO2 (%)  90  -AR     O2 Delivery Intra Treatment  room air  -AR     Post SpO2 (%)  93 pulse ox on throughout walk  -AR     O2 Delivery Post Treatment  room air  -AR     Row Name 09/18/19 1112          Positioning and Restraints    Pre-Treatment Position  sitting in chair/recliner no alarm on arrival  -AR     Post Treatment Position  chair  -AR     In Chair  reclined;sitting;call light within reach;encouraged to call for assist;notified nsg  -AR       User Key  (r) = Recorded By, (t) = Taken By, (c) = Cosigned By    Initials Name Provider Type    AR Leonor Shabazz, PT Physical Therapist        Outcome Measures     Row Name 09/18/19 1113          How much help from another person do you currently need...    Turning from your back to your side while in flat bed without using bedrails?  4  -AR     Moving  from lying on back to sitting on the side of a flat bed without bedrails?  4  -AR     Moving to and from a bed to a chair (including a wheelchair)?  4  -AR     Standing up from a chair using your arms (e.g., wheelchair, bedside chair)?  4  -AR     Climbing 3-5 steps with a railing?  3  -AR     To walk in hospital room?  4  -AR     AM-PAC 6 Clicks Score (PT)  23  -AR     Row Name 09/18/19 1113          Functional Assessment    Outcome Measure Options  AM-PAC 6 Clicks Basic Mobility (PT)  -AR       User Key  (r) = Recorded By, (t) = Taken By, (c) = Cosigned By    Initials Name Provider Type    AR Leonor Shabazz, CHERRY Physical Therapist        Physical Therapy Education     Title: PT OT SLP Therapies (In Progress)     Topic: Physical Therapy (Done)     Point: Mobility training (Done)     Learning Progress Summary           Patient Acceptance, E, VU by AR at 9/18/2019 11:16 AM    Acceptance, E,TB, VU,NR by  at 9/17/2019 10:29 AM                   Point: Precautions (Done)     Learning Progress Summary           Patient Acceptance, E, VU by AR at 9/18/2019 11:16 AM    Acceptance, E,TB, VU,NR by LB at 9/17/2019 10:29 AM                               User Key     Initials Effective Dates Name Provider Type Discipline    LB 04/03/18 -  Jie Tovar, PT Physical Therapist PT    AR 04/03/18 -  Leonor Shabazz PT Physical Therapist PT              PT Recommendation and Plan     Outcome Summary/Treatment Plan (PT)  Anticipated Discharge Disposition (PT): home with assist  Plan of Care Reviewed With: patient  Outcome Summary: Improved endurance and independence w/ mobility.  Able to ambulate 300' w/ supervision using RW; on room air throughout tx.  Sp02 maintained >90%, monitored throughout walk.  Discussed activity/walking recom and PT POC. DC PT - no further need for inpatient PT, RN aware.      Time Calculation:   PT Charges     Row Name 09/18/19 5024             Time Calculation    Start Time  1051  -AR      Stop Time   1116  -AR      Time Calculation (min)  25 min  -AR      PT Received On  09/18/19  -AR        User Key  (r) = Recorded By, (t) = Taken By, (c) = Cosigned By    Initials Name Provider Type    AR Leonor Shabazz PT Physical Therapist        Therapy Charges for Today     Code Description Service Date Service Provider Modifiers Qty    35037265597 HC PT THER PROC EA 15 MIN 9/18/2019 Leonor Shabazz, PT GP 2    38443619808 HC PT THER SUPP EA 15 MIN 9/18/2019 Leonor Shabazz, PT GP 1          PT G-Codes  Outcome Measure Options: AM-PAC 6 Clicks Basic Mobility (PT)  AM-PAC 6 Clicks Score (PT): 23  AM-PAC 6 Clicks Score (OT): 19    Leonor Shabazz PT  9/18/2019

## 2019-09-18 NOTE — PLAN OF CARE
Problem: Patient Care Overview  Goal: Plan of Care Review   09/18/19 1116   Coping/Psychosocial   Plan of Care Reviewed With patient   OTHER   Outcome Summary Improved endurance and independence w/ mobility. Able to ambulate 300' w/ supervision using RW; on room air throughout tx. Sp02 maintained >90%, monitored throughout walk. Discussed activity/walking recom and PT POC. DC PT - no further need for inpatient PT, RN aware.

## 2019-09-18 NOTE — PLAN OF CARE
Problem: Patient Care Overview  Goal: Plan of Care Review  Outcome: Ongoing (interventions implemented as appropriate)   09/18/19 1413   Coping/Psychosocial   Plan of Care Reviewed With patient   Plan of Care Review   Progress improving   OTHER   Outcome Summary Pt progressing with OT. Pt with increased tolerance for functional tasks. Pt ambulates to and from bathroom from chair in room and performs commode transfer with CGA. Educated with energy conservation and adls. Continue to follow for OT

## 2019-09-18 NOTE — THERAPY TREATMENT NOTE
Acute Care - Occupational Therapy Progress Note  [facility]     Patient Name: Caitlyn Longoria  : 1934  MRN: [id number]  Today's Date: 2019             Admit Date: 2019       ICD-10-CM ICD-9-CM   1. Acute respiratory failure with hypoxia (CMS/[facility]) J96.01 518.81     Patient Active Problem List   Diagnosis   • Neck and shoulder pain   • Arthropathy of shoulder region   • Carpal tunnel syndrome of left wrist   • Chronic pain of both shoulders   • Chronic left shoulder pain   • Arthropathy of left shoulder   • Dysarthria   • DM type 2 (diabetes mellitus, type 2) (CMS/HCC)   • PAF (paroxysmal atrial fibrillation) (CMS/HCC)   • HLD (hyperlipidemia)   • Bronchitis   • Coronary artery disease involving native coronary artery of native heart with angina pectoris (CMS/HCC)   • CVA (cerebral vascular accident) (CMS/[facility])   • HTN (hypertension)   • CKD (chronic kidney disease) stage 3, GFR 30-59 ml/min (CMS/[facility])   • Chronic combined systolic and diastolic congestive heart failure (CMS/HCC)   • Infection of prosthetic right knee joint (CMS/HCC)   • Stasis dermatitis of right lower extremity due to peripheral venous hypertension   • Current use of long term anticoagulation   • Morbidly obese (CMS/[facility])   • Acute respiratory failure with hypoxia (CMS/[facility])     Past Medical History:   Diagnosis Date   • Aortic calcification (CMS/HCC)     mild, on echo 2017   • Aortic regurgitation     Trace   • CAD (coronary artery disease)    • Chronic combined systolic and diastolic congestive heart failure (CMS/HCC)    • CKD (chronic kidney disease) stage 3, GFR 30-59 ml/min (CMS/HCC)    • Coronary artery disease involving native coronary artery of native heart with angina pectoris (CMS/HCC)    • DM type 2 (diabetes mellitus, type 2) (CMS/HCC)    • Hypertension    • Mild mitral regurgitation    • Mitral annular calcification     2017- echo, moderate   • PAF (paroxysmal atrial fibrillation) (CMS/HCC)    • SSS (sick  sinus syndrome) (CMS/HCC)    • Tricuspid regurgitation     Trace     Past Surgical History:   Procedure Laterality Date   • CARDIAC CATHETERIZATION     • CHOLECYSTECTOMY     • CORONARY STENT PLACEMENT     • HERNIA REPAIR     • HYSTERECTOMY     • PACEMAKER IMPLANTATION     • REPLACEMENT TOTAL KNEE         Therapy Treatment    Rehabilitation Treatment Summary     Row Name 09/18/19 1410             Treatment Time/Intention    Discipline  occupational therapist  -SG      Document Type  therapy note (daily note)  -SG      Subjective Information  no complaints  -SG      Mode of Treatment  individual therapy;occupational therapy  -SG      Recorded by [SG] Zoey Hale OTR 09/18/19 1412      Row Name 09/18/19 1410             Cognitive Assessment/Intervention- PT/OT    Orientation Status (Cognition)  oriented x 4  -SG      Follows Commands (Cognition)  WFL  -SG      Recorded by [SG] Zoey Hale OTCAMRON 09/18/19 1412      Row Name 09/18/19 1410             Functional Mobility    Functional Mobility- Ind. Level  supervision required  -SG      Functional Mobility- Device  rolling walker  -SG      Functional Mobility- Comment  pt ambulates to bathroom and back to chair  -SG      Recorded by [SG] Zoey Hale OTR 09/18/19 1412      Row Name 09/18/19 1410             Transfer Assessment/Treatment    Transfer Assessment/Treatment  toilet transfer  -SG      Recorded by [SG] Zoey Hale OTR 09/18/19 1412      Row Name 09/18/19 1410             Sit-Stand Transfer    Sit-Stand [place] (Transfers)  supervision  -SG      Assistive Device (Sit-Stand Transfers)  walker, front-wheeled  -SG      Recorded by [SG] Zoey Hale OTCAMRON 09/18/19 1412      Row Name 09/18/19 1410             Stand-Sit Transfer    Stand-Sit [place] (Transfers)  supervision  -SG      Assistive Device (Stand-Sit Transfers)  walker, front-wheeled  -SG      Recorded by [SG] Zoey Hale OTR 09/18/19 1412      Row Name 09/18/19 1410              Toilet Transfer    Type (Toilet Transfer)  sit-stand;stand-sit  -SG      [place] Level (Toilet Transfer)  contact guard  -SG      Assistive Device (Toilet Transfer)  commode;grab bars/safety frame  -SG      Recorded by [SG] Zoey Hale OTR 09/18/19 1412      Row Name 09/18/19 1410             Grooming Assessment/Training    [place] Level (Grooming)  grooming skills;supervision  -SG      Grooming Position  supported sitting  -SG      Recorded by [SG] Zoey Hale OT 09/18/19 1412      Row Name 09/18/19 1410             BADL Safety/Performance    Impairments, BADL Safety/Performance  endurance/activity tolerance  -SG      Skilled BADL Treatment/Intervention  BADL process/adaptation training;energy conservation educated with energy conservation  -SG2      Recorded by [SG] Zoey Hale OTR 09/18/19 1412  [SG2] Zoey Hale OTR 09/18/19 1413      Row Name 09/18/19 1410             Positioning and Restraints    Pre-Treatment Position  sitting in chair/recliner  -SG      Post Treatment Position  chair  -SG      In Chair  reclined;call light within reach;encouraged to call for assist  -SG      Recorded by [SG] Zoey Hale OTR 09/18/19 1412      Row Name 09/18/19 1410             Pain Scale: Numbers Pre/Post-Treatment    Pain Scale: Numbers, Pretreatment  0/10 - no pain  -SG      Pain Scale: Numbers, Post-Treatment  0/10 - no pain  -SG      Recorded by [SG] Zoey Hale, R 09/18/19 1412        User Key  (r) = Recorded By, (t) = Taken By, (c) = Cosigned By    Initials Name Effective Dates Discipline    SG Zoey Hale OTR 06/08/18 -  OT             Occupational Therapy Education     Title: PT OT SLP Therapies (In Progress)     Topic: Occupational Therapy (In Progress)     Point: ADL training (Done)     Description: Instruct learner(s) on proper safety adaptation and remediation techniques during self care or transfers.   Instruct in proper use of assistive devices.     Learning Progress Summary           Patient Acceptance, E,TB,D, VU,NR by  at 9/17/2019  2:52 PM    Comment:  safety for adl. discussed energy conservation to assist with adl                               User Key     Initials Effective Dates Name Provider Type Discipline     06/08/18 -  Zoey Hale OTR Occupational Therapist OT                OT Recommendation and Plan  Planned Therapy Interventions (OT Eval): activity tolerance training, BADL retraining  Therapy Frequency (OT Eval): 5 times/wk  Plan of Care Review  Plan of Care Reviewed With: patient  Plan of Care Reviewed With: patient  Outcome Summary: Pt progressing with OT. Pt with increased tolerance for functional tasks. Pt ambulates to and from bathroom from chair in room and performs commode transfer with CGA.  Educated with energy conservation and adls. Continue to follow for OT  Outcome Measures     Row Name 09/18/19 1416 09/17/19 1455          How much help from another is currently needed...    Putting on and taking off regular lower body clothing?  3  -SG  3  -SG     Bathing (including washing, rinsing, and drying)  3  -SG  3  -SG     Toileting (which includes using toilet bed pan or urinal)  3  -SG  3  -SG     Putting on and taking off regular upper body clothing  3  -SG  3  -SG     Taking care of personal grooming (such as brushing teeth)  3  -SG  3  -SG     Eating meals  4  -SG  4  -SG     AM-PAC 6 Clicks Score (OT)  19  -SG  19  -SG        Functional Assessment    Outcome Measure Options  --  AM-PAC 6 Clicks Daily Activity (OT)  -SG       User Key  (r) = Recorded By, (t) = Taken By, (c) = Cosigned By    Initials Name Provider Type    SG Zoey Hale OTR Occupational Therapist           Time Calculation:   Time Calculation- OT     Row Name 09/18/19 1416             Time Calculation- OT    OT Start Time  1245  -SG      OT Stop Time  1308  -SG      OT Time Calculation (min)  23 min  -SG      Total Timed Code Minutes- OT  23 minute(s)   -      OT Received On  09/18/19  -        User Key  (r) = Recorded By, (t) = Taken By, (c) = Cosigned By    Initials Name Provider Type    Zoey Arreguin OTR Occupational Therapist        Therapy Charges for Today     Code Description Service Date Service Provider Modifiers Qty    34972530636 HC OT EVAL LOW COMPLEXITY 2 9/17/2019 Zoey Hale OTR GO 1    08285662177 HC OT SELF CARE/MGMT/TRAIN EA 15 MIN 9/17/2019 Zoey Hale OTR GO 1    09604868141 HC OT SELF CARE/MGMT/TRAIN EA 15 MIN 9/18/2019 Zoey Hale OTR GO 2               MAEVE Murray  9/18/2019

## 2019-09-18 NOTE — PROGRESS NOTES
"[facility] PULMONARY CARE         Dr Najera Sayied   LOS: 2 days   Patient Care Team:  Zenaida Suarez MD as PCP - General (Internal Medicine)  Zenaida Suarez MD as PCP - Claims Attributed  Pao Levi [facility] as Pharmacist    Chief Complaint: Acute hypoxemia with suspected asthma exacerbation GERD elevated left hemidiaphragm multiple medical problems    Interval History: Feels better but still having issues and requiring oxygen.  She has a persistent cough and wants a cough medicine.    REVIEW OF SYSTEMS:   CARDIOVASCULAR: No chest pain, chest pressure or chest discomfort. No palpitations or edema.   RESPIRATORY: Shortness of breath with minimal exertion with cough and congestion ongoing  GASTROINTESTINAL: No anorexia, nausea, vomiting or diarrhea. No abdominal pain or blood.   HEMATOLOGIC: No bleeding or bruising.     Ventilator/Non-Invasive Ventilation Settings (From admission, onward)    None            Vital Signs  Temp:  [97.4 °F (36.3 °C)-98.7 °F (37.1 °C)] 97.4 °F (36.3 °C)  Heart Rate:  [79-93] 81  Resp:  [18] 18  BP: (125-147)/(63-81) 137/76    Intake/Output Summary (Last 24 hours) at 9/18/2019 1042  Last data filed at 9/18/2019 1041  Gross per 24 hour   Intake 240 ml   Output 300 ml   Net -60 ml     Flowsheet Rows      First Filed Value   Admission Height  162.6 cm (64\") Documented at 09/16/2019 1205   Admission Weight  106 kg (233 lb) Documented at 09/16/2019 1205          Physical Exam:   General Appearance:    Alert, cooperative, in no acute distress   Lungs:    Diminished breath sounds with rhonchi wheezing bilaterally on the basis    Heart:    Regular rhythm and normal rate, normal S1 and S2, no            murmur, no gallop, no rub, no click   Chest Wall:    No abnormalities observed   Abdomen:     Normal bowel sounds, no masses, no organomegaly, soft        non-tender, non-distended, no guarding, no rebound                tenderness   Extremities:   Moves all extremities " well, no edema, no cyanosis, no             redness     Results Review:        Results from last 7 days   Lab Units 09/18/19  0434 09/17/19  0509 09/16/19  1207   SODIUM mmol/L 138 142 142   POTASSIUM mmol/L 3.7 3.5 3.7   CHLORIDE mmol/L 101 105 102   CO2 mmol/L 24.4 23.9 24.6   BUN mg/dL 18 20 22   CREATININE mg/dL 0.90 1.05* 1.12*   GLUCOSE mg/dL 245* 114* 92   CALCIUM mg/dL 8.5* 8.3* 9.0     Results from last 7 days   Lab Units 09/16/19  1207   TROPONIN T ng/mL <0.010     Results from last 7 days   Lab Units 09/17/19  0509 09/16/19  1207   WBC 10*3/mm3 9.75 12.92*   HEMOGLOBIN g/dL 12.1 13.9   HEMATOCRIT % 39.0 42.8   PLATELETS 10*3/mm3 152 165     Results from last 7 days   Lab Units 09/18/19  0434 09/17/19  0509 09/16/19  1956   INR  2.45* 2.54* 2.64*                 Results from last 7 days   Lab Units 09/16/19  1707   PH, ARTERIAL pH units 7.495*   PO2 ART mm Hg 53.5*   PCO2, ARTERIAL mm Hg 30.0*   HCO3 ART mmol/L 23.1       I reviewed the patient's new clinical results.  I personally viewed and interpreted the patient's CXR        Medication Review:     aspirin 81 mg Oral Daily   budesonide 0.5 mg Nebulization Daily - RT   carvedilol 3.125 mg Oral BID   cephalexin 500 mg Oral Q8H   furosemide 40 mg Oral QAM   glipiZIDE 2.5 mg Oral QAM   ipratropium-albuterol 3 mL Nebulization 4x Daily - RT   loperamide 2 mg Oral Daily   losartan 25 mg Oral Daily   methylPREDNISolone sodium succinate 40 mg Intravenous Q8H   oxybutynin XL 10 mg Oral Nightly   pantoprazole 40 mg Oral BID   rosuvastatin 20 mg Oral Nightly   sodium chloride 10 mL Intravenous Q12H   sodium chloride 10 mL Intravenous Q12H   warfarin 2 mg Oral Once per day on Tue Sat   warfarin 4 mg Oral Once per day on Sun Mon Wed Thu Fri         Pharmacy to dose warfarin        ASSESSMENT:   Acute hypoxemia  Asthma exacerbation  GERD suspected hiatal hernia  Elevated left hemidiaphragm  Paroxysmal A. fib  Diabetes mellitus  Hypertension  History of CVA  History of  systolic and diastolic CHF  CHARLENE on CPAP  Morbid obesity  History of septic knee       PLAN:  Improved with steroids.  I will wean steroids slowly  Cough medicine will be started.  ID input noted.  Appreciated ID input  Ambulate today  Physical therapy  Bronchodilators  Continue Lasix at current dose  Physical therapy  Likely discharge in the morning if improved      Dania La MD  09/18/19  10:42 AM

## 2019-09-18 NOTE — PROGRESS NOTES
Continued Stay Note  [facility]     Patient Name: Caitlyn Longoria  MRN: [id number]  Today's Date: 9/18/2019    Admit Date: 9/16/2019    Discharge Plan     Row Name 09/18/19 1337       Plan    Plan  return to [place] MAURICIO Hawley following for for [facility] in case skilled care is needed    Patient/Family in Agreement with Plan  yes    Plan Comments  Patient now refusing [facility].  Plan is home.  Marleen/[place] following in case skilled care is needed. Raine Oliveira RN        Discharge Codes    No documentation.             Raine Oliveira RN

## 2019-09-19 LAB
ANION GAP SERPL CALCULATED.3IONS-SCNC: 12.8 MMOL/L (ref 5–15)
BUN BLD-MCNC: 26 MG/DL (ref 8–23)
BUN/CREAT SERPL: 27.4 (ref 7–25)
CALCIUM SPEC-SCNC: 8.1 MG/DL (ref 8.6–10.5)
CHLORIDE SERPL-SCNC: 102 MMOL/L (ref 98–107)
CO2 SERPL-SCNC: 24.2 MMOL/L (ref 22–29)
CREAT BLD-MCNC: 0.95 MG/DL (ref 0.57–1)
GFR SERPL CREATININE-BSD FRML MDRD: 56 ML/MIN/1.73
GLUCOSE BLD-MCNC: 234 MG/DL (ref 65–99)
GLUCOSE BLDC GLUCOMTR-MCNC: 226 MG/DL (ref 70–130)
INR PPP: 2.72 (ref 0.9–1.1)
POTASSIUM BLD-SCNC: 3.7 MMOL/L (ref 3.5–5.2)
PROTHROMBIN TIME: 28.6 SECONDS (ref 11.7–14.2)
SODIUM BLD-SCNC: 139 MMOL/L (ref 136–145)

## 2019-09-19 PROCEDURE — 25010000002 METHYLPREDNISOLONE PER 40 MG: Performed by: INTERNAL MEDICINE

## 2019-09-19 PROCEDURE — 82962 GLUCOSE BLOOD TEST: CPT

## 2019-09-19 PROCEDURE — 85610 PROTHROMBIN TIME: CPT | Performed by: INTERNAL MEDICINE

## 2019-09-19 PROCEDURE — 94799 UNLISTED PULMONARY SVC/PX: CPT

## 2019-09-19 PROCEDURE — 80048 BASIC METABOLIC PNL TOTAL CA: CPT | Performed by: INTERNAL MEDICINE

## 2019-09-19 RX ORDER — DEXAMETHASONE 2 MG/1
2 TABLET ORAL EVERY 12 HOURS SCHEDULED
Status: DISCONTINUED | OUTPATIENT
Start: 2019-09-19 | End: 2019-09-20 | Stop reason: HOSPADM

## 2019-09-19 RX ORDER — GLIPIZIDE 5 MG/1
2.5 TABLET ORAL
Status: DISCONTINUED | OUTPATIENT
Start: 2019-09-19 | End: 2019-09-19

## 2019-09-19 RX ADMIN — DEXAMETHASONE 2 MG: 2 TABLET ORAL at 14:19

## 2019-09-19 RX ADMIN — IPRATROPIUM BROMIDE AND ALBUTEROL SULFATE 3 ML: 2.5; .5 SOLUTION RESPIRATORY (INHALATION) at 16:10

## 2019-09-19 RX ADMIN — LOPERAMIDE HYDROCHLORIDE 2 MG: 2 CAPSULE ORAL at 09:50

## 2019-09-19 RX ADMIN — CARVEDILOL 3.12 MG: 3.12 TABLET, FILM COATED ORAL at 20:33

## 2019-09-19 RX ADMIN — SODIUM CHLORIDE, PRESERVATIVE FREE 10 ML: 5 INJECTION INTRAVENOUS at 20:33

## 2019-09-19 RX ADMIN — SODIUM CHLORIDE, PRESERVATIVE FREE 10 ML: 5 INJECTION INTRAVENOUS at 09:52

## 2019-09-19 RX ADMIN — CEPHALEXIN 500 MG: 500 CAPSULE ORAL at 06:34

## 2019-09-19 RX ADMIN — BUDESONIDE 0.5 MG: 0.5 SUSPENSION RESPIRATORY (INHALATION) at 07:33

## 2019-09-19 RX ADMIN — ASPIRIN 81 MG: 81 TABLET, COATED ORAL at 09:50

## 2019-09-19 RX ADMIN — CEPHALEXIN 500 MG: 500 CAPSULE ORAL at 22:08

## 2019-09-19 RX ADMIN — LOSARTAN POTASSIUM 25 MG: 25 TABLET, FILM COATED ORAL at 09:50

## 2019-09-19 RX ADMIN — ROSUVASTATIN CALCIUM 20 MG: 20 TABLET, FILM COATED ORAL at 20:33

## 2019-09-19 RX ADMIN — DEXAMETHASONE 2 MG: 2 TABLET ORAL at 20:33

## 2019-09-19 RX ADMIN — CEPHALEXIN 500 MG: 500 CAPSULE ORAL at 14:19

## 2019-09-19 RX ADMIN — PANTOPRAZOLE SODIUM 40 MG: 40 TABLET, DELAYED RELEASE ORAL at 09:50

## 2019-09-19 RX ADMIN — GLIPIZIDE 2.5 MG: 5 TABLET ORAL at 06:32

## 2019-09-19 RX ADMIN — HYDROCODONE BITARTRATE AND HOMATROPINE METHYLBROMIDE 5 ML: 5; 1.5 SOLUTION ORAL at 22:08

## 2019-09-19 RX ADMIN — PANTOPRAZOLE SODIUM 40 MG: 40 TABLET, DELAYED RELEASE ORAL at 20:35

## 2019-09-19 RX ADMIN — FUROSEMIDE 40 MG: 40 TABLET ORAL at 06:32

## 2019-09-19 RX ADMIN — IPRATROPIUM BROMIDE AND ALBUTEROL SULFATE 3 ML: 2.5; .5 SOLUTION RESPIRATORY (INHALATION) at 11:32

## 2019-09-19 RX ADMIN — HYDROCODONE BITARTRATE AND HOMATROPINE METHYLBROMIDE 5 ML: 5; 1.5 SOLUTION ORAL at 07:40

## 2019-09-19 RX ADMIN — HYDROCODONE BITARTRATE AND HOMATROPINE METHYLBROMIDE 5 ML: 5; 1.5 SOLUTION ORAL at 14:19

## 2019-09-19 RX ADMIN — IPRATROPIUM BROMIDE AND ALBUTEROL SULFATE 3 ML: 2.5; .5 SOLUTION RESPIRATORY (INHALATION) at 18:57

## 2019-09-19 RX ADMIN — WARFARIN SODIUM 4 MG: 4 TABLET ORAL at 17:21

## 2019-09-19 RX ADMIN — METHYLPREDNISOLONE SODIUM SUCCINATE 40 MG: 40 INJECTION, POWDER, FOR SOLUTION INTRAMUSCULAR; INTRAVENOUS at 06:34

## 2019-09-19 RX ADMIN — SODIUM CHLORIDE, PRESERVATIVE FREE 10 ML: 5 INJECTION INTRAVENOUS at 09:51

## 2019-09-19 RX ADMIN — IPRATROPIUM BROMIDE AND ALBUTEROL SULFATE 3 ML: 2.5; .5 SOLUTION RESPIRATORY (INHALATION) at 07:33

## 2019-09-19 RX ADMIN — OXYBUTYNIN 10 MG: 10 TABLET, FILM COATED, EXTENDED RELEASE ORAL at 20:33

## 2019-09-19 RX ADMIN — CARVEDILOL 3.12 MG: 3.12 TABLET, FILM COATED ORAL at 09:50

## 2019-09-19 NOTE — PLAN OF CARE
Problem: Patient Care Overview  Goal: Plan of Care Review  Outcome: Ongoing (interventions implemented as appropriate)   09/19/19 4795   Coping/Psychosocial   Plan of Care Reviewed With patient   Plan of Care Review   Progress no change   OTHER   Outcome Summary VSS, no new complaints today. Patient has ambulated x 2 in the hallway this shift and several times in her room. Continue diuretics and steriods. PRN cough medicine . Patient will likely discharge tomorrow.        Problem: Fall Risk (Adult)  Goal: Absence of Fall  Outcome: Ongoing (interventions implemented as appropriate)      Problem: ARDS (Acute Resp Distress Syndrome) (Adult)  Goal: Signs and Symptoms of Listed Potential Problems Will be Absent, Minimized or Managed (ARDS)  Outcome: Ongoing (interventions implemented as appropriate)      Problem: Pain, Acute (Adult)  Goal: Acceptable Pain Control/Comfort Level  Outcome: Ongoing (interventions implemented as appropriate)

## 2019-09-19 NOTE — PROGRESS NOTES
"[facility] PULMONARY CARE         Dr Najera Sayied   LOS: 3 days   Patient Care Team:  Zenaida Suarez MD as PCP - General (Internal Medicine)  Zenaida Suarez MD as PCP - Claims Attributed  Pao Levi [facility] as Pharmacist    Chief Complaint: Acute hypoxemia with suspected asthma exacerbation GERD elevated left hemidiaphragm multiple medical problems    Interval History: She feels a little worse today.  Still coughing and wheezing.  Still has issues with hoarseness.    REVIEW OF SYSTEMS:   CARDIOVASCULAR: No chest pain, chest pressure or chest discomfort. No palpitations or edema.   RESPIRATORY: Shortness of breath with minimal exertion with cough and congestion ongoing  GASTROINTESTINAL: No anorexia, nausea, vomiting or diarrhea. No abdominal pain or blood.   HEMATOLOGIC: No bleeding or bruising.     Ventilator/Non-Invasive Ventilation Settings (From admission, onward)    None            Vital Signs  Temp:  [97.3 °F (36.3 °C)-98.4 °F (36.9 °C)] 97.5 °F (36.4 °C)  Heart Rate:  [80-91] 82  Resp:  [18-20] 20  BP: (106-134)/(58-78) 134/75    Intake/Output Summary (Last 24 hours) at 9/19/2019 1151  Last data filed at 9/18/2019 1833  Gross per 24 hour   Intake 480 ml   Output --   Net 480 ml     Flowsheet Rows      First Filed Value   Admission Height  162.6 cm (64\") Documented at 09/16/2019 1205   Admission Weight  106 kg (233 lb) Documented at 09/16/2019 1205          Physical Exam:   General Appearance:    Alert, cooperative, in no acute distress   Lungs:    Diminished breath sounds with rhonchi wheezing bilaterally on the basis    Heart:    Regular rhythm and normal rate, normal S1 and S2, no            murmur, no gallop, no rub, no click   Chest Wall:    No abnormalities observed   Abdomen:     Normal bowel sounds, no masses, no organomegaly, soft        non-tender, non-distended, no guarding, no rebound                tenderness   Extremities:   Moves all extremities well, no edema, no " cyanosis, no             redness     Results Review:        Results from last 7 days   Lab Units 09/19/19  0644 09/18/19  0434 09/17/19  0509   SODIUM mmol/L 139 138 142   POTASSIUM mmol/L 3.7 3.7 3.5   CHLORIDE mmol/L 102 101 105   CO2 mmol/L 24.2 24.4 23.9   BUN mg/dL 26* 18 20   CREATININE mg/dL 0.95 0.90 1.05*   GLUCOSE mg/dL 234* 245* 114*   CALCIUM mg/dL 8.1* 8.5* 8.3*     Results from last 7 days   Lab Units 09/16/19  1207   TROPONIN T ng/mL <0.010     Results from last 7 days   Lab Units 09/17/19  0509 09/16/19  1207   WBC 10*3/mm3 9.75 12.92*   HEMOGLOBIN g/dL 12.1 13.9   HEMATOCRIT % 39.0 42.8   PLATELETS 10*3/mm3 152 165     Results from last 7 days   Lab Units 09/19/19  0644 09/18/19  0434 09/17/19  0509   INR  2.72* 2.45* 2.54*                 Results from last 7 days   Lab Units 09/16/19  1707   PH, ARTERIAL pH units 7.495*   PO2 ART mm Hg 53.5*   PCO2, ARTERIAL mm Hg 30.0*   HCO3 ART mmol/L 23.1       I reviewed the patient's new clinical results.  I personally viewed and interpreted the patient's CXR        Medication Review:     aspirin 81 mg Oral Daily   budesonide 0.5 mg Nebulization Daily - RT   carvedilol 3.125 mg Oral BID   cephalexin 500 mg Oral Q8H   furosemide 40 mg Oral QAM   glipiZIDE 2.5 mg Oral QAM   glipiZIDE 2.5 mg Oral QAM AC   ipratropium-albuterol 3 mL Nebulization 4x Daily - RT   loperamide 2 mg Oral Daily   losartan 25 mg Oral Daily   methylPREDNISolone sodium succinate 40 mg Intravenous Q8H   oxybutynin XL 10 mg Oral Nightly   pantoprazole 40 mg Oral BID   rosuvastatin 20 mg Oral Nightly   sodium chloride 10 mL Intravenous Q12H   sodium chloride 10 mL Intravenous Q12H   warfarin 2 mg Oral Once per day on Tue Sat   warfarin 4 mg Oral Once per day on Sun Mon Wed Thu Fri         Pharmacy to dose warfarin        ASSESSMENT:   Acute hypoxemia  Asthma exacerbation  GERD suspected hiatal hernia  Elevated left hemidiaphragm  Paroxysmal A. fib  Diabetes mellitus  Hypertension  History of  CVA  History of systolic and diastolic CHF  CHARLENE on CPAP  Morbid obesity  History of septic knee       PLAN:  Clinically she feels a little worse.  I would wean down steroids as tolerated.  I have asked her to ambulate more  Blood glucose have been high home Glucotrol has been started  Cough medicine will be started.  ID input noted.  Appreciated ID input  Physical therapy  Bronchodilators  Continue Lasix at current dose  Physical therapy  Hopefully discharge in 1 or 2 days once clinically better      Dania La MD  09/19/19  11:51 AM

## 2019-09-20 ENCOUNTER — NURSE TRIAGE (OUTPATIENT)
Dept: CALL CENTER | Facility: HOSPITAL | Age: 84
End: 2019-09-20

## 2019-09-20 ENCOUNTER — APPOINTMENT (OUTPATIENT)
Dept: PHYSICAL THERAPY | Facility: HOSPITAL | Age: 84
End: 2019-09-20

## 2019-09-20 VITALS
SYSTOLIC BLOOD PRESSURE: 131 MMHG | OXYGEN SATURATION: 100 % | RESPIRATION RATE: 20 BRPM | WEIGHT: 226.2 LBS | TEMPERATURE: 97.8 F | BODY MASS INDEX: 38.62 KG/M2 | HEIGHT: 64 IN | HEART RATE: 81 BPM | DIASTOLIC BLOOD PRESSURE: 80 MMHG

## 2019-09-20 LAB
INR PPP: 3.07 (ref 0.9–1.1)
PROTHROMBIN TIME: 31.4 SECONDS (ref 11.7–14.2)

## 2019-09-20 PROCEDURE — 94799 UNLISTED PULMONARY SVC/PX: CPT

## 2019-09-20 PROCEDURE — 85610 PROTHROMBIN TIME: CPT | Performed by: INTERNAL MEDICINE

## 2019-09-20 RX ORDER — DEXAMETHASONE 1 MG
1 TABLET ORAL TAKE AS DIRECTED
Qty: 15 TABLET | Refills: 0 | Status: SHIPPED | OUTPATIENT
Start: 2019-09-20 | End: 2019-10-02

## 2019-09-20 RX ORDER — ALBUTEROL SULFATE 90 UG/1
2 AEROSOL, METERED RESPIRATORY (INHALATION) EVERY 4 HOURS PRN
Qty: 1 INHALER | Refills: 3 | Status: SHIPPED | OUTPATIENT
Start: 2019-09-20

## 2019-09-20 RX ADMIN — CEPHALEXIN 500 MG: 500 CAPSULE ORAL at 06:13

## 2019-09-20 RX ADMIN — DEXAMETHASONE 2 MG: 2 TABLET ORAL at 09:15

## 2019-09-20 RX ADMIN — PANTOPRAZOLE SODIUM 40 MG: 40 TABLET, DELAYED RELEASE ORAL at 09:15

## 2019-09-20 RX ADMIN — LOPERAMIDE HYDROCHLORIDE 2 MG: 2 CAPSULE ORAL at 09:15

## 2019-09-20 RX ADMIN — LOSARTAN POTASSIUM 25 MG: 25 TABLET, FILM COATED ORAL at 09:15

## 2019-09-20 RX ADMIN — HYDROCODONE BITARTRATE AND HOMATROPINE METHYLBROMIDE 5 ML: 5; 1.5 SOLUTION ORAL at 06:55

## 2019-09-20 RX ADMIN — ASPIRIN 81 MG: 81 TABLET, COATED ORAL at 09:15

## 2019-09-20 RX ADMIN — FUROSEMIDE 40 MG: 40 TABLET ORAL at 06:13

## 2019-09-20 RX ADMIN — CARVEDILOL 3.12 MG: 3.12 TABLET, FILM COATED ORAL at 09:15

## 2019-09-20 RX ADMIN — GLIPIZIDE 2.5 MG: 5 TABLET ORAL at 09:20

## 2019-09-20 RX ADMIN — IPRATROPIUM BROMIDE AND ALBUTEROL SULFATE 3 ML: 2.5; .5 SOLUTION RESPIRATORY (INHALATION) at 07:36

## 2019-09-20 NOTE — DISCHARGE SUMMARY
PHYSICIAN DISCHARGE SUMMARY                                                                        [facility]    Patient Identification:  Name: Caitlyn Longoria  Age: 84 y.o.  Sex: female  :  1934  MRN: [id number]  Primary Care Physician: Zenaida Suarez MD    Admit date: 2019  Discharge date and time: No discharge date for patient encounter.   Discharged Condition: stable    Discharge Diagnoses:  Acute respiratory failure with hypoxia (CMS/HCC)   Acute hypoxemia  Asthma exacerbation  GERD suspected hiatal hernia  Elevated left hemidiaphragm  Paroxysmal A. fib  Diabetes mellitus  Hypertension  History of CVA  History of systolic and diastolic CHF  CHARLENE on CPAP  Morbid obesity  History of septic knee    Hospital Course: Caitlyn Longoria presented to [facility]    84-year-old female admitted with dyspnea acute hypoxemia felt to be due to asthma exacerbation.  She did have some GERD however no hiatal hernia noted on CT chest.  She did have elevated left hemidiaphragm noted.  With steroid and bronchodilators she did improve.  She is currently tapered down to oral steroids.  She had a history of chronic knee infection and ID recommended continuing antibiotics.  At this time she has received maximum hospital benefits.  She will follow-up with Dr. Coburn as per his recommendations.  Activities as tolerated.  Consults:   IP CONSULT TO PULMONOLOGY  IP CONSULT TO PULMONOLOGY  IP CONSULT TO INFECTIOUS DISEASES    Significant Diagnostic Studies:   [unfilled]    Discharge Exam:  Alert and oriented x 4, in NAD  Supple neck, midline trach  RRR, no m/r/g, no edema  Clear bilaterally, no wheezing, nonlabored  No clubbing or cyanosis     Disposition:  Home    Patient Instructions:   [unfilled]  Follow-up Information     Zenaida Suarez MD .    Specialty:  Internal Medicine  Contact information:  [address]  [facility]  [address]  [phone]                 [unfilled]        Discharge Medications      New Medications      Instructions Start Date   dexamethasone 1 MG tablet  Commonly known as:  DECADRON   1 mg, Oral, Take As Directed, 2 tab for 3 days then 1 tab for 3 days then 1/2 tab for 3 days then stop      HYDROcodone-homatropine 5-1.5 MG/5ML syrup  Commonly known as:  HYCODAN   5 mL, Oral, Every 6 Hours PRN         Changes to Medications      Instructions Start Date   albuterol sulfate  (90 Base) MCG/ACT inhaler  Commonly known as:  PROVENTIL HFA;VENTOLIN HFA;PROAIR HFA  What changed:  Another medication with the same name was removed. Continue taking this medication, and follow the directions you see here.   2 puffs, Inhalation, Every 4 Hours PRN         Continue These Medications      Instructions Start Date   acetaminophen 650 MG 8 hr tablet  Commonly known as:  TYLENOL   650 mg, Oral, 2 Times Daily      Acetylcysteine 500 MG capsule   1,000 mg, Oral, Daily With Dinner      aspirin 81 MG tablet   81 mg, Oral, Daily      BREO ELLIPTA 200-25 MCG/INH inhaler  Generic drug:  Fluticasone Furoate-Vilanterol   1 puff, Inhalation, Daily - RT      CALCIUM 500+D 500-200 MG-UNIT tablet  Generic drug:  Calcium Carb-Cholecalciferol   1 tablet, Oral, 2 Times Daily      carvedilol 3.125 MG tablet  Commonly known as:  COREG   3.125 mg, Oral, 2 Times Daily      cefadroxil 500 MG capsule  Commonly known as:  DURICEF   500 mg, Oral, 2 Times Daily, Taken consistently for MSSA infection      cetirizine 10 MG tablet  Commonly known as:  zyrTEC   10 mg, Oral, 2 Times Daily      fish oil 1000 MG capsule capsule   1,000 mg, Oral, 2 Times Daily With Meals      fluticasone 50 MCG/ACT nasal spray  Commonly known as:  FLONASE   1 spray, Nasal, 2 Times Daily      furosemide 20 MG tablet  Commonly known as:  LASIX   20 mg, Oral, Every Morning      glipiZIDE 5 MG tablet  Commonly known as:  GLUCOTROL   2.5 mg, Oral, Every Morning      INCRUSE ELLIPTA  62.5 MCG/INH aerosol powder   Generic drug:  Umeclidinium Bromide   1 each, Inhalation, Daily      loperamide 2 MG capsule  Commonly known as:  IMODIUM   2 mg, Oral, Daily, After first bowel movement      losartan 25 MG tablet  Commonly known as:  COZAAR   25 mg, Oral, Daily      LUBRICATING EYE DROPS 0.4-0.3 % solution ophthalmic solution  Generic drug:  polyethyl glycol-propyl glycol   1 drop, Both Eyes, Every 1 Hour PRN      oxazepam 10 MG capsule  Commonly known as:  SERAX   10 mg, Oral, Nightly      oxybutynin XL 10 MG 24 hr tablet  Commonly known as:  DITROPAN-XL   10 mg, Oral, Every Night at Bedtime      pantoprazole 40 MG EC tablet  Commonly known as:  PROTONIX   40 mg, Oral, 2 Times Daily      rosuvastatin 20 MG tablet  Commonly known as:  CRESTOR   20 mg, Oral, Nightly      warfarin 4 MG tablet  Commonly known as:  COUMADIN   2 mg, Oral, 2 Times Weekly, Tues and Sat and 4 mg on all other days      warfarin 4 MG tablet  Commonly known as:  COUMADIN   4 mg, Oral, See Admin Instructions, 4 mg on Sun, Mon, Wed, Thurs, Fri and 2 mg on Tues and Sat         Stop These Medications    amoxicillin 500 MG capsule  Commonly known as:  AMOXIL     levoFLOXacin 750 MG tablet  Commonly known as:  LEVAQUIN            Total time spent discharging patient including evaluation, medication reconciliation, arranging follow up, and post hospitalization instructions and education total time exceeds 30 minutes.    Signed:  Dania La MD  9/20/2019  10:13 AM

## 2019-09-20 NOTE — PROGRESS NOTES
Continued Stay Note  [facility]     Patient Name: Caitlyn Longoria  MRN: [id number]  Today's Date: 9/20/2019    Admit Date: 9/16/2019    Discharge Plan     Row Name 09/20/19 0946       Plan    Plan Comments  DC orders in EPIC.  Spoke with patient at bedside and she denies any needs.  Plan [place] return [place] apartment at [place].  Raine Oliveira RN        Discharge Codes    No documentation.       Expected Discharge Date and Time     Expected Discharge Date Expected Discharge Time    Sep 20, 2019             Raine Oliveira RN

## 2019-09-20 NOTE — PLAN OF CARE
Problem: Patient Care Overview  Goal: Plan of Care Review  Outcome: Ongoing (interventions implemented as appropriate)   09/20/19 5574   Coping/Psychosocial   Plan of Care Reviewed With patient   Plan of Care Review   Progress improving   OTHER   Outcome Summary VSS. No c/o of pain. Pt still very hoarse and received one dose of cough medicine last night. Pt up with stand-by assist and walker to BR. Pt refuses bed alarm but will call out to get help to BR. Pt on O2 At HS due to sleep apnea and not having home machine. Pt has slept well most of shift. Plan is to DC home possibly today or the next day. Will continue to monitor.      Goal: Individualization and Mutuality  Outcome: Ongoing (interventions implemented as appropriate)      Problem: Fall Risk (Adult)  Goal: Identify Related Risk Factors and Signs and Symptoms  Outcome: Outcome(s) achieved Date Met: 09/20/19    Goal: Absence of Fall  Outcome: Ongoing (interventions implemented as appropriate)      Problem: ARDS (Acute Resp Distress Syndrome) (Adult)  Goal: Signs and Symptoms of Listed Potential Problems Will be Absent, Minimized or Managed (ARDS)  Outcome: Ongoing (interventions implemented as appropriate)      Problem: Pain, Acute (Adult)  Goal: Identify Related Risk Factors and Signs and Symptoms  Outcome: Outcome(s) achieved Date Met: 09/20/19    Goal: Acceptable Pain Control/Comfort Level  Outcome: Ongoing (interventions implemented as appropriate)

## 2019-09-20 NOTE — PROGRESS NOTES
Pharmacy Consult: Warfarin Dosing/ Monitoring    Caitlyn Longoria is a 84 y.o. female, who has a past medical history of Aortic calcification (CMS/[facility]), Aortic regurgitation, CAD (coronary artery disease), Chronic combined systolic and diastolic congestive heart failure (CMS/[facility]), CKD (chronic kidney disease) stage 3, GFR 30-59 ml/min (CMS/[facility]), Coronary artery disease involving native coronary artery of native heart with angina pectoris (CMS/[facility]), DM type 2 (diabetes mellitus, type 2) (CMS/[facility]), Hypertension, Mild mitral regurgitation, Mitral annular calcification, PAF (paroxysmal atrial fibrillation) (CMS/[facility]), SSS (sick sinus syndrome) (CMS/[facility]), and Tricuspid regurgitation.    Social History     Tobacco Use   • Smoking status: Never Smoker   • Smokeless tobacco: Never Used   • Tobacco comment: caffeine use   Substance Use Topics   • Alcohol use: No   • Drug use: No     Results from last 7 days   Lab Units 09/18/19  0434 09/17/19  0509 09/16/19  1956 09/16/19  1207   INR  2.45* 2.54* 2.64* 2.50*   HEMOGLOBIN g/dL  --  12.1  --  13.9   HEMATOCRIT %  --  39.0  --  42.8   PLATELETS 10*3/mm3  --  152  --  165     Results from last 7 days   Lab Units 09/18/19  0434 09/17/19  0509 09/16/19  1207   SODIUM mmol/L 138 142 142   POTASSIUM mmol/L 3.7 3.5 3.7   CHLORIDE mmol/L 101 105 102   CO2 mmol/L 24.4 23.9 24.6   BUN mg/dL 18 20 22   CREATININE mg/dL 0.90 1.05* 1.12*   CALCIUM mg/dL 8.5* 8.3* 9.0   BILIRUBIN mg/dL  --   --  0.8   ALK PHOS U/L  --   --  81   ALT (SGPT) U/L  --   --  19   AST (SGOT) U/L  --   --  25   GLUCOSE mg/dL 245* 114* 92     Anticoagulation history: Warfarin therapy managed by  Anticoagulation Clinic since September of last year. Last visit on 8/28/19, maintained regimen of Warfarin 2mg Tues and Saturday, Warfarin 4mg all other days. INR has been in range of 2-3 on this regimen each of the previous visits to clinic since February    [facility] Anticoagulation:  Consulting provider:   Sayevin  Start date: 9/16/19  Indication: Paroxysmal Atrial Fibrillation  Target INR: 2-3  Expected duration: Lifelong  Bridge Therapy: None                Date 9/16 9/17 9/18 9/19 9/20        INR 2.5/  2.64 2.54 2.45 2.72 3.07        Warfarin dose 4 mg 2 mg 4 mg 4mg Hold dose          Potential drug interactions:   1. Cephalexin (major)-concurrent use with warfarin can increase risk of bleed due to disruption of vitamin K synthesis  2. Protonix (moderate)-concurrent use with warfarin inhibits [id number] warfarin metabolism, and may result in increased INR and increased risk of bleed.  3. Rosuvastatin (moderate)-concurrent use with warfarin can increase risk of bleed.     Relevant nutrition status: Regular carb consistent diet    Education complete: Yes/ Date: 9/17/19    Assessment/Plan:  Dose: inr is slightly supra therapeutic    Will hold today's dose  Monitor: Daily PT/INR  Follow up: 9/21/19    Pharmacy will continue to follow until discharge or discontinuation of warfarin.   Pattie Mckenzie, [facility]    9/20/19

## 2019-09-20 NOTE — TELEPHONE ENCOUNTER
"    Reason for Disposition  • Health Information question, no triage required and triager able to answer question    Additional Information  • Negative: [1] Caller is not with the adult (patient) AND [2] reporting urgent symptoms  • Negative: Lab result questions  • Negative: Medication questions  • Negative: Caller cannot be reached by phone  • Negative: Caller has already spoken to PCP or another triager  • Negative: RN needs further essential information from caller in order to complete triage  • Negative: Requesting regular office appointment  • Negative: [1] Caller requesting NON-URGENT health information AND [2] PCP's office is the best resource    Answer Assessment - Initial Assessment Questions  1. REASON FOR CALL or QUESTION: \"What is your reason for calling today?\" or \"How can I best help you?\" or \"What question do you have that I can help answer?\"      Caller is needing clarification of her home medication list. Reviewed per AVS. Acetylcysteine 500 MG capsule was an old medication that she was no longer taking. She cannot even remember taking this. She will discuss with PCP removing this from her list.    Protocols used: INFORMATION ONLY CALL-ADULT-      "

## 2019-09-20 NOTE — PROGRESS NOTES
Case Management Discharge Note    Final Note: DC home. Raine Oliveira RN    Destination      No service has been selected for the patient.      Durable Medical Equipment      No service has been selected for the patient.      Dialysis/Infusion      No service has been selected for the patient.      Home Medical Care      No service has been selected for the patient.      Therapy      No service has been selected for the patient.      Community Resources      No service has been selected for the patient.        Transportation Services  Private: Car    Final Discharge Disposition Code: 01 - home or self-care

## 2019-09-21 ENCOUNTER — READMISSION MANAGEMENT (OUTPATIENT)
Dept: CALL CENTER | Facility: HOSPITAL | Age: 84
End: 2019-09-21

## 2019-09-21 LAB
BACTERIA SPEC AEROBE CULT: NORMAL
BACTERIA SPEC AEROBE CULT: NORMAL

## 2019-09-21 NOTE — OUTREACH NOTE
Prep Survey      Responses   Facility patient discharged from?  [facility]   Is patient eligible?  Yes   Discharge diagnosis  Acute respiratory failure with hypoxia    Does the patient have one of the following disease processes/diagnoses(primary or secondary)?  Other   Does the patient have Home health ordered?  No   Is there a DME ordered?  No   Prep survey completed?  Yes          Christine Freitas RN

## 2019-09-22 ENCOUNTER — NURSE TRIAGE (OUTPATIENT)
Dept: CALL CENTER | Facility: HOSPITAL | Age: 84
End: 2019-09-22

## 2019-09-22 NOTE — TELEPHONE ENCOUNTER
"Caller was recently discharged from [facility]. She has a question concerning a new medication and information was given.    Reason for Disposition  • Caller has NON-URGENT medication question about med that PCP prescribed and triager unable to answer question    Additional Information  • Negative: Drug overdose and nurse unable to answer question  • Negative: Caller requesting information not related to medicine  • Negative: Caller requesting a prescription for Strep throat and has a positive culture result  • Negative: Rash while taking a medication or within 3 days of stopping it  • Negative: Immunization reaction suspected  • Negative: [1] Asthma and [2] having symptoms of asthma (cough, wheezing, etc)  • Negative: MORE THAN A DOUBLE DOSE of a prescription or over-the-counter (OTC) drug  • Negative: [1] DOUBLE DOSE (an extra dose or lesser amount) of over-the-counter (OTC) drug AND [2] any symptoms (e.g., dizziness, nausea, pain, sleepiness)  • Negative: [1] DOUBLE DOSE (an extra dose or lesser amount) of prescription drug AND [2] any symptoms (e.g., dizziness, nausea, pain, sleepiness)  • Negative: Took another person's prescription drug  • Negative: [1] DOUBLE DOSE (an extra dose or lesser amount) of prescription drug AND [2] NO symptoms (Exception: a double dose of antibiotics)  • Negative: Diabetes drug error or overdose (e.g., insulin or extra dose)  • Negative: [1] Request for URGENT new prescription or refill of \"essential\" medication (i.e., likelihood of harm to patient if not taken) AND [2] triager unable to fill per unit policy  • Negative: [1] Prescription not at pharmacy AND [2] was prescribed today by PCP  • Negative: Pharmacy calling with prescription questions and triager unable to answer question  • Negative: Caller has URGENT medication question about med that PCP prescribed and triager unable to answer question    Answer Assessment - Initial Assessment Questions  1. SYMPTOMS: \"Do you have any " "symptoms?\"      no  2. SEVERITY: If symptoms are present, ask \"Are they mild, moderate or severe?\"      no    Protocols used: MEDICATION QUESTION CALL-ADULT-      "

## 2019-09-23 ENCOUNTER — TELEPHONE (OUTPATIENT)
Dept: CARDIOLOGY | Facility: CLINIC | Age: 84
End: 2019-09-23

## 2019-09-23 NOTE — TELEPHONE ENCOUNTER
----- Message from KRISTIN Frey sent at 8/22/2019  2:48 PM EDT -----  Call to follow up on cough since restarting protonix.

## 2019-09-24 ENCOUNTER — HOSPITAL ENCOUNTER (OUTPATIENT)
Dept: PHYSICAL THERAPY | Facility: HOSPITAL | Age: 84
Setting detail: THERAPIES SERIES
End: 2019-09-24

## 2019-09-24 ENCOUNTER — READMISSION MANAGEMENT (OUTPATIENT)
Dept: CALL CENTER | Facility: HOSPITAL | Age: 84
End: 2019-09-24

## 2019-09-24 NOTE — TELEPHONE ENCOUNTER
Patient called the office today returning Darling phone call. Patient stated that she will be in her apartment all day until 4:30 this evening.    Number she can be reached at is [phone]    Thanks

## 2019-09-24 NOTE — OUTREACH NOTE
Medical Week 1 Survey      Responses   Facility patient discharged from?  [facility]   Does the patient have one of the following disease processes/diagnoses(primary or secondary)?  Other   Is there a successful TCM telephone encounter documented?  No   Week 1 attempt successful?  No   Unsuccessful attempts  Attempt 1          Pattie Perez RN

## 2019-09-24 NOTE — TELEPHONE ENCOUNTER
Spoke with patient. She stated protonix helped some. She then went to se allergist who treated her for an infection. Now s/p DC from [facility] with dyspnea acute hypoxemia felt to be due to asthma exacerbation and she improved with steroid and bronchodilators. Her breathing is better. She still has a cough and hoarse voice. She plans to follow up with her allergist.

## 2019-09-27 ENCOUNTER — READMISSION MANAGEMENT (OUTPATIENT)
Dept: CALL CENTER | Facility: HOSPITAL | Age: 84
End: 2019-09-27

## 2019-09-27 ENCOUNTER — APPOINTMENT (OUTPATIENT)
Dept: PHYSICAL THERAPY | Facility: HOSPITAL | Age: 84
End: 2019-09-27

## 2019-09-27 NOTE — OUTREACH NOTE
Medical Week 1 Survey      Responses   Facility patient discharged from?  [facility]   Does the patient have one of the following disease processes/diagnoses(primary or secondary)?  Other   Is there a successful TCM telephone encounter documented?  No   Week 1 attempt successful?  Yes   Call start time  1224   Call end time  1229   Discharge diagnosis  Acute respiratory failure with hypoxia    Meds reviewed with patient/caregiver?  Yes   Is the patient having any side effects they believe may be caused by any medication additions or changes?  No   Does the patient have all medications ordered at discharge?  Yes   Is the patient taking all medications as directed (includes completed medication regime)?  Yes   Does the patient have a primary care provider?   Yes   Does the patient have an appointment with their PCP within 7 days of discharge?  Yes   Has the patient kept scheduled appointments due by today?  N/A   Has home health visited the patient within 72 hours of discharge?  N/A   Psychosocial issues?  Yes   Did the patient receive a copy of their discharge instructions?  Yes   Nursing interventions  Reviewed instructions with patient   What is the patient's perception of their health status since discharge?  Improving   Is the patient/caregiver able to teach back the hierarchy of who to call/visit for symptoms/problems? PCP, Specialist, Home health nurse, Urgent Care, ED, 911  Yes   Additional teach back comments  Pt says she is breathing better but still is not able to talk without so much hoarseness, no questions or concerns at this time.   Week 1 call completed?  Yes          Amanda English RN

## 2019-10-02 ENCOUNTER — OFFICE VISIT (OUTPATIENT)
Dept: CARDIOLOGY | Facility: CLINIC | Age: 84
End: 2019-10-02

## 2019-10-02 ENCOUNTER — CLINICAL SUPPORT NO REQUIREMENTS (OUTPATIENT)
Dept: CARDIOLOGY | Facility: CLINIC | Age: 84
End: 2019-10-02

## 2019-10-02 ENCOUNTER — ANTICOAGULATION VISIT (OUTPATIENT)
Dept: PHARMACY | Facility: HOSPITAL | Age: 84
End: 2019-10-02

## 2019-10-02 VITALS
WEIGHT: 222.2 LBS | SYSTOLIC BLOOD PRESSURE: 112 MMHG | BODY MASS INDEX: 37.94 KG/M2 | HEART RATE: 87 BPM | HEIGHT: 64 IN | DIASTOLIC BLOOD PRESSURE: 60 MMHG

## 2019-10-02 DIAGNOSIS — I49.5 SICK SINUS SYNDROME (HCC): ICD-10-CM

## 2019-10-02 DIAGNOSIS — I49.5 SICK SINUS SYNDROME (HCC): Primary | ICD-10-CM

## 2019-10-02 DIAGNOSIS — I48.0 PAROXYSMAL ATRIAL FIBRILLATION (HCC): ICD-10-CM

## 2019-10-02 DIAGNOSIS — I63.032 CEREBROVASCULAR ACCIDENT (CVA) DUE TO THROMBOSIS OF LEFT CAROTID ARTERY (HCC): ICD-10-CM

## 2019-10-02 DIAGNOSIS — I25.10 CORONARY ARTERY DISEASE INVOLVING NATIVE CORONARY ARTERY OF NATIVE HEART WITHOUT ANGINA PECTORIS: Primary | ICD-10-CM

## 2019-10-02 DIAGNOSIS — I48.0 PAF (PAROXYSMAL ATRIAL FIBRILLATION) (HCC): ICD-10-CM

## 2019-10-02 DIAGNOSIS — I77.9 BILATERAL CAROTID ARTERY DISEASE, UNSPECIFIED TYPE (HCC): ICD-10-CM

## 2019-10-02 DIAGNOSIS — E11.59 TYPE 2 DIABETES MELLITUS WITH OTHER CIRCULATORY COMPLICATION, WITHOUT LONG-TERM CURRENT USE OF INSULIN (HCC): ICD-10-CM

## 2019-10-02 LAB
INR PPP: 2.9 (ref 0.91–1.09)
PROTHROMBIN TIME: 34.3 SECONDS (ref 10–13.8)

## 2019-10-02 PROCEDURE — 85610 PROTHROMBIN TIME: CPT

## 2019-10-02 PROCEDURE — 36416 COLLJ CAPILLARY BLOOD SPEC: CPT

## 2019-10-02 PROCEDURE — 93000 ELECTROCARDIOGRAM COMPLETE: CPT | Performed by: INTERNAL MEDICINE

## 2019-10-02 PROCEDURE — 93280 PM DEVICE PROGR EVAL DUAL: CPT | Performed by: INTERNAL MEDICINE

## 2019-10-02 PROCEDURE — 99213 OFFICE O/P EST LOW 20 MIN: CPT | Performed by: INTERNAL MEDICINE

## 2019-10-02 RX ORDER — PREDNISONE 1 MG/1
5 TABLET ORAL TAKE AS DIRECTED
COMMUNITY
End: 2020-01-07 | Stop reason: ALTCHOICE

## 2019-10-02 RX ORDER — PREDNISONE 1 MG/1
5 TABLET ORAL TAKE AS DIRECTED
COMMUNITY
Start: 2019-09-26 | End: 2019-10-02

## 2019-10-02 NOTE — PROGRESS NOTES
Anticoagulation Clinic Progress Note    Anticoagulation Summary  As of 10/2/2019    INR goal:   2.0-3.0   TTR:   85.8 % (1 y)   INR used for dosin.9 (10/2/2019)   Warfarin maintenance plan:   2 mg every Tue, Sat; 4 mg all other days   Weekly warfarin total:   24 mg   No change documented:   Christel Velazquez   Plan last modified:   Pao Levi RPH (5/15/2019)   Next INR check:   10/16/2019   Priority:   Maintenance   Target end date:   Indefinite    Indications    PAF (paroxysmal atrial fibrillation) (CMS/[facility]) [I48.0]             Anticoagulation Episode Summary     INR check location:       Preferred lab:       Send INR reminders to:    [facility]    Comments:         Anticoagulation Care Providers     Provider Role Specialty Phone number    Rommel Veliz MD Referring Cardiology [phone]          Clinic Interview:  Patient Findings     Positives:   Change in health, Change in medications    Negatives:   Signs/symptoms of thrombosis, Signs/symptoms of bleeding,   Laboratory test error suspected, Change in alcohol use, Change in   activity, Upcoming invasive procedure, Emergency department visit,   Upcoming dental procedure, Missed doses, Extra doses, Change in   diet/appetite, Hospital admission, Bruising, Other complaints    Comments:   Patient was admitted in hospital  to  Given   Prednisone 5mg at a tapered dose.  Dose in hospital was equal to current   dose.      Clinical Outcomes     Negatives:   Major bleeding event, Thromboembolic event,   Anticoagulation-related hospital admission, Anticoagulation-related ED   visit, Anticoagulation-related fatality    Comments:   Patient was admitted in hospital  to  Given   Prednisone 5mg at a tapered dose.  Dose in hospital was equal to current   dose.        INR History:  Anticoagulation Monitoring 2019 2019 10/2/2019   INR 2.3 2.6 2.9   INR Date 2019 2019 10/2/2019   INR Goal 2.0-3.0 2.0-3.0 2.0-3.0    Trend Same Same Same   Last Week Total 24 mg 24 mg 24 mg   Next Week Total 24 mg 24 mg 24 mg   Sun 4 mg 4 mg 4 mg   Mon 4 mg 4 mg 4 mg   Tue 2 mg 2 mg 2 mg   Wed 4 mg 4 mg 4 mg   Thu 4 mg 4 mg 4 mg   Fri 4 mg 4 mg 4 mg   Sat 2 mg 2 mg 2 mg   Visit Report - - -   Some recent data might be hidden       Plan:  1. INR is therapeutic today- see above in Anticoagulation Summary.   Will instruct Caitlyn Longoria to continue their warfarin regimen- see above in Anticoagulation Summary.  2. Follow up in 2 weeks.  3. Patient declines warfarin refills.  4. Verbal and written information provided. Patient expresses understanding and has no further questions at this time.    Christel Velazquez

## 2019-10-02 NOTE — PROGRESS NOTES
Date of Office Visit: 10/02/19  Encounter Provider: Rommel Veliz MD  Place of Service: [facility] CARDIOLOGY  Patient Name: Caitlyn Longoria  :1934  Referral Provider:No ref. provider found      Chief Complaint   Patient presents with   • Shortness of Breath     History of Present Illness  The patient is a pleasant 84-year-old white female with a history of diabetes and  hypertension who presented in 2008 with chest discomfort. In 2007, it  looked like she had had an anterior infarct. Echocardiogram then confirmed that. She then  had cardiac catheterization on 2008, which confirmed a left ventricular  ejection fraction of 35%, total occlusion of the mid left anterior descending, severe  disease of the large diagonal, and insignificant disease of the right coronary artery and  circumflex. She was admitted and had outpatient angioplasty and drug-eluting stent  placement of the diagonal. Unfortunately, she developed a right femoral pseudoaneurysm and  had to have that surgically repaired. Follow-up echocardiogram in  showed her left  ventricular function had returned to normal. She then presented on 2011, with  increasing shortness of breath and was found to have some mild congestive heart failure,  atrial flutter, and marked bradycardia. She had a repeat echocardiogram again that  confirmed normal left ventricular systolic function. She had a dual-chamber pacemaker  implanted. She continued to have episodes of atrial flutter with symptomatic dyspnea. She  underwent atrial flutter ablation in 2011.  She then presented in 2012 with dyspnea, thought that this might be heart  failure, anginal equivalence. She underwent cardiac catheterization which showed elevated  right-sided pressure with a PA systolic pressure of 53 mmHg, mean of 31 mmHg, but elevated  wedge pressure at 26. She was also found to have in-stent  stenosis of the diagonal vessel.  Other vessels were free of disease with the exception of total occlusion of the mid left  anterior descending which was unchanged. A left ventricular ejection fraction of  approximately 50%. She underwent angioplasty and drug-eluting stent placement of the  diagonal vessel. Prinivil was increased. We increased her Lasix to 30 mg a day however,  she developed dizziness on that and we had to cut her back to 20 mg of Lasix a day.  She unfortunately has had a couple of episodes of passing out. It turns out that was from low   blood pressure, and they resolved with adjusting her medications.  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 She ended up being admitted and had the device removed and cleaned  out. During that hospital stay she was found to have a nonfunctioning RV lead. She was  stable and in view of the infection, it was decided not to go and replace the lead at that  time.  She then in 10/2014 had her RV lead revised.   Then in November 2016 was having some increased shortness of breath had a perfusion stress test that showed a very small area of ischemia in the septal wall her echocardiogram showed a left ventricular ejection fraction of approximately 45% which was about her baseline no real significant valvular disease.  She isn't subsequently diagnosed as having significant gastroesophageal reflux with aspiration and been treated for that.    In December 2017 she was admitted to [facility] with a cerebral vascular accident she had an echocardiogram that confirmed severe left ventricular systolic dysfunction and ejection fraction of 23% no significant valvular disease aspirin was added to her regimen.  She's ended in our hospital with septic knees and infection treated with antibiotics and developed lymphedema of her lower extremity was seen in April 2018 emergency room for that.  She now comes  in for follow-up. She was in the hospital September 2019 with exacerbation of her lung disease.  Since she has been home she just does not feel like she is gotten a lot better.  She is now on steroids that were started about 4 days ago.  She has been weighing herself daily and her weights are actually down.  No chest pain or pressure she still is markedly out of breath no orthopnea or PND no increased lower extremity edema.  No palpitations no near syncope or syncope.      Atrial Fibrillation   Symptoms include dizziness. Symptoms are negative for weakness. Past medical history includes atrial fibrillation and CAD.   Coronary Artery Disease   Symptoms include dizziness. Pertinent negatives include no muscle weakness or weight gain. Her past medical history is significant for past myocardial infarction.         Past Medical History:   Diagnosis Date   • Aortic calcification (CMS/HCC)     mild, on echo 12/17/2017   • Aortic regurgitation     Trace   • CAD (coronary artery disease)    • Chronic combined systolic and diastolic congestive heart failure (CMS/HCC)    • CKD (chronic kidney disease) stage 3, GFR 30-59 ml/min (CMS/HCC)    • Coronary artery disease involving native coronary artery of native heart with angina pectoris (CMS/HCC)    • DM type 2 (diabetes mellitus, type 2) (CMS/HCC)    • Hypertension    • Mild mitral regurgitation    • Mitral annular calcification     12/8/2017- echo, moderate   • PAF (paroxysmal atrial fibrillation) (CMS/HCC)    • SSS (sick sinus syndrome) (CMS/HCC)    • Tricuspid regurgitation     Trace         Past Surgical History:   Procedure Laterality Date   • CARDIAC CATHETERIZATION     • CHOLECYSTECTOMY     • CORONARY STENT PLACEMENT     • HERNIA REPAIR     • HYSTERECTOMY     • PACEMAKER IMPLANTATION     • REPLACEMENT TOTAL KNEE           Current Outpatient Medications on File Prior to Visit   Medication Sig Dispense Refill   • acetaminophen (TYLENOL) 650 MG 8 hr tablet Take 650 mg by  mouth 2 (Two) Times a Day.     • Acetylcysteine 500 MG capsule Take 1,000 mg by mouth Daily With Dinner.     • albuterol sulfate  (90 Base) MCG/ACT inhaler Inhale 2 puffs Every 4 (Four) Hours As Needed for Wheezing. 1 inhaler 3   • aspirin 81 MG tablet Take 81 mg by mouth Daily.     • Calcium Carb-Cholecalciferol (CALCIUM 500+D) 500-200 MG-UNIT tablet Take 1 tablet by mouth 2 (Two) Times a Day.     • carvedilol (COREG) 3.125 MG tablet Take 1 tablet by mouth 2 (Two) Times a Day. 180 tablet 3   • cefadroxil (DURICEF) 500 MG capsule Take 500 mg by mouth 2 (Two) Times a Day. Taken consistently for MSSA infection     • cetirizine (ZyrTEC) 10 MG tablet Take 10 mg by mouth 2 (Two) Times a Day.     • dexamethasone (DECADRON) 1 MG tablet Take 1 tablet by mouth Take As Directed. 2 tab for 3 days then 1 tab for 3 days then 1/2 tab for 3 days then stop 15 tablet 0   • fluticasone (FLONASE) 50 MCG/ACT nasal spray 1 spray into the nostril(s) as directed by provider 2 (Two) Times a Day.     • Fluticasone Furoate-Vilanterol (BREO ELLIPTA) 200-25 MCG/INH inhaler Inhale 1 puff Daily.     • furosemide (LASIX) 20 MG tablet Take 20 mg by mouth Every Morning.     • glipiZIDE (GLUCOTROL) 5 MG tablet Take 2.5 mg by mouth Every Morning.     • loperamide (IMODIUM) 2 MG capsule Take 2 mg by mouth Daily. After first bowel movement     • losartan (COZAAR) 25 MG tablet Take 25 mg by mouth Daily.     • Omega-3 Fatty Acids (FISH OIL) 1000 MG capsule capsule Take 1,000 mg by mouth 2 (Two) Times a Day With Meals.     • oxazepam (SERAX) 10 MG capsule Take 1 capsule by mouth Every Night. 5 capsule 0   • oxybutynin XL (DITROPAN-XL) 10 MG 24 hr tablet Take 10 mg by mouth every night at bedtime.     • pantoprazole (PROTONIX) 40 MG EC tablet Take 1 tablet by mouth 2 (Two) Times a Day. 60 tablet 1   • polyethyl glycol-propyl glycol (LUBRICATING EYE DROPS) 0.4-0.3 % solution ophthalmic solution Administer 1 drop to both eyes Every 1 (One) Hour As  Needed.     • rosuvastatin (CRESTOR) 20 MG tablet Take 20 mg by mouth Every Night.     • Umeclidinium Bromide (INCRUSE ELLIPTA) 62.5 MCG/INH aerosol powder  Inhale 1 each Daily.     • warfarin (COUMADIN) 4 MG tablet Take 2 mg by mouth 2 (Two) Times a Week. Tues and Sat and 4 mg on all other days     • warfarin (COUMADIN) 4 MG tablet Take 4 mg by mouth See Admin Instructions. 4 mg on Sun, Mon, Wed, Thurs, Fri and 2 mg on Tues and Sat       No current facility-administered medications on file prior to visit.          Social History     Socioeconomic History   • Marital status: Single     Spouse name: Not on file   • Number of children: Not on file   • Years of education: Not on file   • Highest education level: Not on file   Tobacco Use   • Smoking status: Never Smoker   • Smokeless tobacco: Never Used   • Tobacco comment: caffeine use   Substance and Sexual Activity   • Alcohol use: No   • Drug use: No   • Sexual activity: Defer   Lifestyle   • Physical activity:     Days per week: 2 days     Minutes per session: 60 min   • Stress: Not on file       Family History   Problem Relation Age of Onset   • Heart disease Mother    • Hypertension Mother    • Heart disease Father    • Hypertension Father    • Hypertension Brother    • Heart disease Brother          Review of Systems   Constitution: Positive for malaise/fatigue. Negative for decreased appetite, diaphoresis, fever, weakness, weight gain and weight loss.   HENT: Negative for congestion, hearing loss, nosebleeds and tinnitus.    Eyes: Negative for blurred vision, double vision, vision loss in left eye, vision loss in right eye and visual disturbance.   Cardiovascular:        As noted in HPI   Respiratory:        As noted HPI   Endocrine: Negative for cold intolerance and heat intolerance.   Hematologic/Lymphatic: Negative for bleeding problem. Does not bruise/bleed easily.   Skin: Negative for color change, flushing, itching and rash.   Musculoskeletal: Negative  for arthritis, back pain, joint pain, joint swelling, muscle weakness and myalgias.   Gastrointestinal: Negative for bloating, abdominal pain, constipation, diarrhea, dysphagia, heartburn, hematemesis, hematochezia, melena, nausea and vomiting.   Genitourinary: Negative for bladder incontinence, dysuria, frequency, nocturia and urgency.   Neurological: Positive for dizziness. Negative for focal weakness, headaches, light-headedness, loss of balance, numbness, paresthesias and vertigo.   Psychiatric/Behavioral: Negative for depression, memory loss and substance abuse.       Procedures      ECG 12 Lead  Date/Time: 10/2/2019 11:30 AM  Performed by: Rommel Veliz MD  Authorized by: Rommel Veliz MD   Comparison: compared with previous ECG   Similar to previous ECG  Rhythm comments: AV paced                  Objective:    There were no vitals taken for this visit.       Physical Exam  Physical Exam   Constitutional: She is oriented to person, place, and time. She appears well-developed and well-nourished. No distress.   HENT:   Head: Normocephalic.   Eyes: Conjunctivae are normal. Pupils are equal, round, and reactive to light. No scleral icterus.   Neck: Normal carotid pulses, no hepatojugular reflux and no JVD present. Carotid bruit is not present. No tracheal deviation, no edema and no erythema present. No thyromegaly present.   Cardiovascular: Normal rate, regular rhythm, S1 normal, S2 normal and intact distal pulses.  No extrasystoles are present. PMI is not displaced. Exam reveals no gallop, no distant heart sounds and no friction rub.   Murmur heard.   Systolic murmur is present with a grade of 2/6 at the upper right sternal border.  Pulses:       Carotid pulses are 2+ on the right side, and 2+ on the left side.       Radial pulses are 2+ on the right side, and 2+ on the left side.        Femoral pulses are 2+ on the right side, and 2+ on the left side.       Dorsalis pedis pulses are 2+ on the right  side, and 2+ on the left side.        Posterior tibial pulses are 2+ on the right side, and 2+ on the left side.   Pulmonary/Chest: Effort normal. No respiratory distress. She has decreased breath sounds in the left lower field. She has no wheezes. She has rhonchi (Scattered). She has no rales. She exhibits no tenderness.   Abdominal: Soft. Bowel sounds are normal. She exhibits no distension and no mass. There is no hepatosplenomegaly. There is no tenderness. There is no rebound and no guarding.   Musculoskeletal: She exhibits edema (1+ bilateral tibial). She exhibits no tenderness or deformity.   Neurological: She is alert and oriented to person, place, and time.   Skin: Skin is warm and dry. No rash noted. She is not diaphoretic. No cyanosis or erythema. No pallor. Nails show no clubbing.   Psychiatric: She has a normal mood and affect. Her speech is normal and behavior is normal. Judgment and thought content normal.           Assessment:   1. This is a 84-year-old female with history of coronary disease over the mid left anterior descending with total occlusion of that. First diagonal vessel with severe  disease status post angioplasty and drug-eluting stent placement of that. Followup catheterization in 10/12 showed restenosis of the diagonal vessel with repeat angioplasty  and drug-eluting stent placement of that. Left ventricular ejection fraction of approximately 50% with segmental wall motion abnormality but at the time of that catheterization did  have elevated pulmonary artery pressure as well as pulmonary capillary wedge pressure. Echocardiogram May 2018 left ventricular ejection fraction improved from 23% to 35% to 40%.  No evidence of heart failure.   Coronary Artery Disease  Assessment  • The patient has no angina  • On warfarin     Plan  • Lifestyle modifications discussed include adhering to a heart healthy diet, avoidance of tobacco products, maintenance of a healthy weight, medication compliance,  regular exercise and regular monitoring of cholesterol and blood pressure     Subjective - Objective  • There is a history of past MI  • There has been a previous stent procedure using LOLA  • Current antiplatelet therapy includes aspirin 81 mg   Her weights are down her lower semi-edema is better her current status does not appear to be needed due to volume overload.      2. Atrial fibrillation/sick sinus syndrome status post permanent pacemaker implantation status post flutter ablation. S/P RV lead revision.   Atrial Fibrillation and Atrial Flutter  Assessment  • The patient has persistent atrial fibrillation  • This is non-valvular in etiology  • The patient's CHADS2-VASc score is 9  • A [id number] score of 2 or more is considered a high risk for a thromboembolic event  • Warfarin prescribed     Plan  • Continue in atrial fibrillation with rate control  • Continue warfarin for antithrombotic therapy, bleeding issues discussed  • Continue beta blocker for rhythm control  And atrial fib flutter today continue the same rate controlled.      3. Diabetes mellitus, followed in your office.   4. Hypertensive. Blood pressure adequately controlled.   5. Hyperlipidemia. She is following with her PCP on target dose rosuvastatin.  6. History of sleep apnea, on CPAP.   7. History of pulmonary hypertension, follows with pulmonary.   8. Obesity as above.  9. Syncope. Secondary to hypotension. No recurrence.   10.  COPD with elevated left hemidiaphragm now increased shortness of breath appears to be all secondary to exacerbation of her COPD she has an appointment with pulmonary tomorrow this does not appear to be heart failure.  11.  Cerebral vascular accident most likely embolic from her atrial fibrillation now on aspirin.    12.  Recurrent infection I believe in her knee replacement now receiving antibiotics following with orthopedics as well as infectious disease.  13.  Lower extremity edema she had gone to lymphedema  clinic.  This now is stable   14.  Ischemic cardiomyopathy left ventricular ejection fraction now 35-40% moderately reduced on echocardiogram May 2018 she is weighing herself daily those are stable she's to continue the same.   Thanks she will see our nurse practitioner in 3 months and me 3 months after that.       Plan:

## 2019-10-06 ENCOUNTER — READMISSION MANAGEMENT (OUTPATIENT)
Dept: CALL CENTER | Facility: HOSPITAL | Age: 84
End: 2019-10-06

## 2019-10-06 NOTE — OUTREACH NOTE
Medical Week 2 Survey      Responses   Facility patient discharged from?  [facility]   Does the patient have one of the following disease processes/diagnoses(primary or secondary)?  Other   Week 2 attempt successful?  Yes   Call start time  1241   Call end time  1243   Meds reviewed with patient/caregiver?  Yes   Is the patient taking all medications as directed (includes completed medication regime)?  Yes   Has the patient kept scheduled appointments due by today?  Yes   Comments  Yes has seen PCP and will see allergy Dr.    What is the patient's perception of their health status since discharge?  Improving   Is the patient/caregiver able to teach back signs and symptoms related to disease process for when to call PCP?  Yes   Is the patient/caregiver able to teach back signs and symptoms related to disease process for when to call 911?  Yes   Additional teach back comments  Feeling much better, has allergies   Week 2 Call Completed?  Yes   Wrap up additional comments  She feels better looking forward to getting allergy shots again once a week for 3 months          Rolanda Sevilla, RN

## 2019-10-16 ENCOUNTER — ANTICOAGULATION VISIT (OUTPATIENT)
Dept: PHARMACY | Facility: HOSPITAL | Age: 84
End: 2019-10-16

## 2019-10-16 ENCOUNTER — READMISSION MANAGEMENT (OUTPATIENT)
Dept: CALL CENTER | Facility: HOSPITAL | Age: 84
End: 2019-10-16

## 2019-10-16 DIAGNOSIS — I48.0 PAF (PAROXYSMAL ATRIAL FIBRILLATION) (HCC): ICD-10-CM

## 2019-10-16 LAB
INR PPP: 2.4 (ref 0.91–1.09)
PROTHROMBIN TIME: 28.8 SECONDS (ref 10–13.8)

## 2019-10-16 PROCEDURE — 85610 PROTHROMBIN TIME: CPT

## 2019-10-16 PROCEDURE — 36416 COLLJ CAPILLARY BLOOD SPEC: CPT

## 2019-10-16 NOTE — PROGRESS NOTES
Anticoagulation Clinic Progress Note    Anticoagulation Summary  As of 10/16/2019    INR goal:   2.0-3.0   TTR:   86.3 % (1 y)   INR used for dosin.4 (10/16/2019)   Warfarin maintenance plan:   2 mg every Tue, Sat; 4 mg all other days   Weekly warfarin total:   24 mg   No change documented:   Hillary Hays, Pharmacy Intern   Plan last modified:   Pao Levi RPH (5/15/2019)   Next INR check:   2019   Priority:   Maintenance   Target end date:   Indefinite    Indications    PAF (paroxysmal atrial fibrillation) (CMS/[facility]) [I48.0]             Anticoagulation Episode Summary     INR check location:       Preferred lab:       Send INR reminders to:    [facility]    Comments:         Anticoagulation Care Providers     Provider Role Specialty Phone number    Rommel Veliz MD Referring Cardiology [phone]          Clinic Interview:  Patient Findings     Negatives:   Signs/symptoms of thrombosis, Signs/symptoms of bleeding,   Laboratory test error suspected, Change in health, Change in alcohol use,   Change in activity, Upcoming invasive procedure, Emergency department   visit, Upcoming dental procedure, Missed doses, Extra doses, Change in   medications, Change in diet/appetite, Hospital admission, Bruising, Other   complaints      Clinical Outcomes     Negatives:   Major bleeding event, Thromboembolic event,   Anticoagulation-related hospital admission, Anticoagulation-related ED   visit, Anticoagulation-related fatality        INR History:  Anticoagulation Monitoring 2019 10/2/2019 10/16/2019   INR 2.6 2.9 2.4   INR Date 2019 10/2/2019 10/16/2019   INR Goal 2.0-3.0 2.0-3.0 2.0-3.0   Trend Same Same Same   Last Week Total 24 mg 24 mg 24 mg   Next Week Total 24 mg 24 mg 24 mg   Sun 4 mg 4 mg 4 mg   Mon 4 mg 4 mg 4 mg   Tue 2 mg 2 mg 2 mg   Wed 4 mg 4 mg 4 mg   Thu 4 mg 4 mg 4 mg   Fri 4 mg 4 mg 4 mg   Sat 2 mg 2 mg 2 mg   Visit Report - - -   Some recent data might be  hidden       Plan:  1. INR is Therapeutic today- see above in Anticoagulation Summary.  Will instruct Caitlyn Longoria to Continue their warfarin regimen- see above in Anticoagulation Summary.  2. Follow up in 4 weeks  3. Patient declines warfarin refills.  4. Verbal and written information provided. Patient expresses understanding and has no further questions at this time.    Hillary Hays, Pharmacy Intern

## 2019-10-16 NOTE — OUTREACH NOTE
Medical Week 3 Survey      Responses   Facility patient discharged from?  [facility]   Does the patient have one of the following disease processes/diagnoses(primary or secondary)?  Other   Week 3 attempt successful?  Yes   Call start time  1612   Call end time  1615   Discharge diagnosis  Acute respiratory failure with hypoxia    Is the patient taking all medications as directed (includes completed medication regime)?  Yes   Has the patient kept scheduled appointments due by today?  Yes   What is the patient's perception of their health status since discharge?  Improving   Additional teach back comments  pt says she is feeling much better, no questions or concerns at this time.   Week 3 Call Completed?  Yes   Graduated  Yes   Did the patient feel the follow up calls were helpful during their recovery period?  Yes   Was the number of calls appropriate?  Yes   Graduated/Revoked comments  goals met.          Amanda English RN

## 2019-11-04 ENCOUNTER — DOCUMENTATION (OUTPATIENT)
Dept: PHYSICAL THERAPY | Facility: HOSPITAL | Age: 84
End: 2019-11-04

## 2019-11-04 NOTE — THERAPY DISCHARGE NOTE
Outpatient Physical Therapy Discharge Summary         Patient Name: Caitlyn Longoria  : 1934  MRN: [id number]    Today's Date: 2019    Visit Dx:  No diagnosis found.    PT OP Goals     Row Name 19 1500          PT Short Term Goals    STG Date to Achieve  19  -LB     STG 1  Pt will demonstrate understanding and compliance with intial HEP.  -LB     STG 1 Progress  Not Met  -LB     STG 1 Progress Comments  Not formally reassessed.  -LB     STG 2  Pt will demonstrate log roll appropriately in/out of bed to reduce pain in AM.  -LB     STG 2 Progress  Not Met  -LB     STG 2 Progress Comments  Not formally reassessed.  -LB     STG 3  Pt will report 50% reduced pain with initial sit EOB when performing HEP.  -LB     STG 3 Progress  Not Met  -LB     STG 3 Progress Comments  Not formally reassessed.  -LB        Long Term Goals    LTG Date to Achieve  19  -LB     LTG 1  Pt will report pain at worse dec from 6/10 to 4/10 or better.  -LB     LTG 1 Progress  Not Met  -LB     LTG 1 Progress Comments  Not formally reassessed.  -LB     LTG 2  Pt will demonstrate TA activation in HL position with appropriate breathing to allow improved lumbar stability.  -LB     LTG 2 Progress  Not Met  -LB     LTG 2 Progress Comments  Not formally reassessed.  -LB     LTG 3  Pt will report reduced overall disability evidenced by reduced Oswestry disabiliyt score from 46% to 30% or less.  -LB     LTG 3 Progress  Not Met  -LB     LTG 3 Progress Comments  Not formally reassessed.  -LB       User Key  (r) = Recorded By, (t) = Taken By, (c) = Cosigned By    Initials Name Provider Type    Rosalinda Cooney PT Physical Therapist          OP PT Discharge Summary  Date of Discharge: 10/05/19  Reason for Discharge: Patient/Caregiver request  Outcomes Achieved: Unable to make functional progress toward goals at this time  Discharge Destination: Home with home program  Discharge Instructions/Additional Comments: Pt  hospitalized after initial evaluation for several weeks. She returned for follow up after hospital course and reported LBP resolved. She wished to d/c from PT services due to resolution of symptoms.       Time Calculation:                    Rosalinda Lawrence, PT  11/4/2019

## 2019-11-13 ENCOUNTER — ANTICOAGULATION VISIT (OUTPATIENT)
Dept: PHARMACY | Facility: HOSPITAL | Age: 84
End: 2019-11-13

## 2019-11-13 DIAGNOSIS — I48.0 PAF (PAROXYSMAL ATRIAL FIBRILLATION) (HCC): ICD-10-CM

## 2019-11-13 LAB
INR PPP: 2.6 (ref 0.91–1.09)
PROTHROMBIN TIME: 31 SECONDS (ref 10–13.8)

## 2019-11-13 PROCEDURE — 85610 PROTHROMBIN TIME: CPT

## 2019-11-13 PROCEDURE — 36416 COLLJ CAPILLARY BLOOD SPEC: CPT

## 2019-11-13 NOTE — PROGRESS NOTES
Anticoagulation Clinic Progress Note    Anticoagulation Summary  As of 2019    INR goal:   2.0-3.0   TTR:   87.2 % (1.1 y)   INR used for dosin.6 (2019)   Warfarin maintenance plan:   2 mg every Tue, Sat; 4 mg all other days   Weekly warfarin total:   24 mg   No change documented:   Christel Velazquez   Plan last modified:   Pao Levi RPH (5/15/2019)   Next INR check:   2019   Priority:   Maintenance   Target end date:   Indefinite    Indications    PAF (paroxysmal atrial fibrillation) (CMS/[facility]) [I48.0]             Anticoagulation Episode Summary     INR check location:       Preferred lab:       Send INR reminders to:    [facility]    Comments:         Anticoagulation Care Providers     Provider Role Specialty Phone number    Rommel Veliz MD Referring Cardiology [phone]          Clinic Interview:  Patient Findings     Positives:   Other complaints    Negatives:   Signs/symptoms of thrombosis, Signs/symptoms of bleeding,   Laboratory test error suspected, Change in health, Change in alcohol use,   Change in activity, Upcoming invasive procedure, Emergency department   visit, Upcoming dental procedure, Missed doses, Extra doses, Change in   medications, Change in diet/appetite, Hospital admission, Bruising    Comments:   Small hand wound. - 3 days of some bleeding.      Clinical Outcomes     Negatives:   Major bleeding event, Thromboembolic event,   Anticoagulation-related hospital admission, Anticoagulation-related ED   visit, Anticoagulation-related fatality    Comments:   Small hand wound. - 3 days of some bleeding.        INR History:  Anticoagulation Monitoring 10/2/2019 10/16/2019 2019   INR 2.9 2.4 2.6   INR Date 10/2/2019 10/16/2019 2019   INR Goal 2.0-3.0 2.0-3.0 2.0-3.0   Trend Same Same Same   Last Week Total 24 mg 24 mg 24 mg   Next Week Total 24 mg 24 mg 24 mg   Sun 4 mg 4 mg 4 mg   Mon 4 mg 4 mg 4 mg   Tue 2 mg 2 mg 2 mg   Wed 4 mg 4 mg  4 mg   Thu 4 mg 4 mg 4 mg   Fri 4 mg 4 mg 4 mg   Sat 2 mg 2 mg 2 mg   Visit Report - - -   Some recent data might be hidden       Plan:  1. INR is therapeutic today- see above in Anticoagulation Summary.   Will instruct Caitlyn Longoria to continue their warfarin regimen- see above in Anticoagulation Summary.  2. Follow up in 4 weeks.  3. Patient declines warfarin refills.  4. Verbal and written information provided. Patient expresses understanding and has no further questions at this time.    Christel Velazquez

## 2019-12-09 RX ORDER — WARFARIN SODIUM 4 MG/1
TABLET ORAL
Qty: 90 TABLET | Refills: 0 | Status: SHIPPED | OUTPATIENT
Start: 2019-12-09 | End: 2019-12-12 | Stop reason: SDUPTHER

## 2019-12-11 ENCOUNTER — APPOINTMENT (OUTPATIENT)
Dept: PHARMACY | Facility: HOSPITAL | Age: 84
End: 2019-12-11

## 2019-12-12 ENCOUNTER — ANTICOAGULATION VISIT (OUTPATIENT)
Dept: PHARMACY | Facility: HOSPITAL | Age: 84
End: 2019-12-12

## 2019-12-12 DIAGNOSIS — I48.0 PAF (PAROXYSMAL ATRIAL FIBRILLATION) (HCC): ICD-10-CM

## 2019-12-12 LAB
INR PPP: 5 (ref 0.91–1.09)
INR PPP: 5 (ref 0.91–1.09)
PROTHROMBIN TIME: 60.2 SECONDS (ref 10–13.8)
PROTHROMBIN TIME: 60.5 SECONDS (ref 10–13.8)

## 2019-12-12 PROCEDURE — G0463 HOSPITAL OUTPT CLINIC VISIT: HCPCS

## 2019-12-12 PROCEDURE — 85610 PROTHROMBIN TIME: CPT

## 2019-12-12 PROCEDURE — 36416 COLLJ CAPILLARY BLOOD SPEC: CPT

## 2019-12-12 RX ORDER — WARFARIN SODIUM 4 MG/1
TABLET ORAL
Qty: 90 TABLET | Refills: 0 | Status: ON HOLD | OUTPATIENT
Start: 2019-12-12 | End: 2020-02-13 | Stop reason: SDUPTHER

## 2019-12-12 NOTE — PROGRESS NOTES
Anticoagulation Clinic Progress Note    Anticoagulation Summary  As of 2019    INR goal:   2.0-3.0   TTR:   82.5 % (1.2 y)   INR used for dosin.0! (2019)   Warfarin maintenance plan:   2 mg every Tue, Sat; 4 mg all other days   Weekly warfarin total:   24 mg   Plan last modified:   Pao Levi RPH (5/15/2019)   Next INR check:   2019   Priority:   Maintenance   Target end date:   Indefinite    Indications    PAF (paroxysmal atrial fibrillation) (CMS/[facility]) [I48.0]             Anticoagulation Episode Summary     INR check location:       Preferred lab:       Send INR reminders to:    [facility]    Comments:         Anticoagulation Care Providers     Provider Role Specialty Phone number    Rommel Veliz MD Referring Cardiology [phone]          Clinic Interview:  Patient Findings     Positives:   Change in health, Change in medications    Negatives:   Signs/symptoms of thrombosis, Signs/symptoms of bleeding,   Laboratory test error suspected, Change in alcohol use, Change in   activity, Upcoming invasive procedure, Emergency department visit,   Upcoming dental procedure, Missed doses, Extra doses, Change in   diet/appetite, Hospital admission, Bruising, Other complaints    Comments:   Recent UTI, finished nitrofurantoin course  per report.       Clinical Outcomes     Negatives:   Major bleeding event, Thromboembolic event,   Anticoagulation-related hospital admission, Anticoagulation-related ED   visit, Anticoagulation-related fatality    Comments:   Recent UTI, finished nitrofurantoin course  per report.         INR History:  Anticoagulation Monitoring 10/16/2019 2019 2019   INR 2.4 2.6 5.0   INR Date 10/16/2019 2019 2019   INR Goal 2.0-3.0 2.0-3.0 2.0-3.0   Trend Same Same Same   Last Week Total 24 mg 24 mg 24 mg   Next Week Total 24 mg 24 mg 18 mg   Sun 4 mg 4 mg 4 mg   Mon 4 mg 4 mg 4 mg   Tue 2 mg 2 mg 2 mg   Wed 4 mg 4 mg 4 mg    Thu 4 mg 4 mg Hold (12/12)   Fri 4 mg 4 mg 2 mg (12/13)   Sat 2 mg 2 mg 2 mg   Visit Report - - -   Some recent data might be hidden       Plan:  1. INR is Supratherapeutic today (POC 5.0/5.0; refused venous INR) - see above in Anticoagulation Summary.  Will instruct Caitlyn Longoria to Change their warfarin regimen- see above in Anticoagulation Summary.  2. Follow up in 1 week  3. Patient declines warfarin refills.  4. Verbal and written information provided. To seek immediate medical attention if s/sx of bleeding develop or fall occurs. Patient expresses understanding and has no further questions at this time.    Alexander Valiente [facility]

## 2019-12-12 NOTE — TELEPHONE ENCOUNTER
Warfarin refill transmission to [facility] pharmacy failed on 12/9/19. Resent today.

## 2019-12-19 ENCOUNTER — ANTICOAGULATION VISIT (OUTPATIENT)
Dept: PHARMACY | Facility: HOSPITAL | Age: 84
End: 2019-12-19

## 2019-12-19 DIAGNOSIS — I48.0 PAF (PAROXYSMAL ATRIAL FIBRILLATION) (HCC): ICD-10-CM

## 2019-12-19 LAB
INR PPP: 2.8 (ref 0.91–1.09)
PROTHROMBIN TIME: 33.2 SECONDS (ref 10–13.8)

## 2019-12-19 PROCEDURE — 85610 PROTHROMBIN TIME: CPT

## 2019-12-19 PROCEDURE — 36416 COLLJ CAPILLARY BLOOD SPEC: CPT

## 2019-12-19 NOTE — PROGRESS NOTES
Anticoagulation Clinic Progress Note    Anticoagulation Summary  As of 2019    INR goal:   2.0-3.0   TTR:   81.3 % (1.2 y)   INR used for dosin.8 (2019)   Warfarin maintenance plan:   2 mg every Tue, Sat; 4 mg all other days   Weekly warfarin total:   24 mg   No change documented:   Jovita Small, Pharmacy Intern   Plan last modified:   Pao Levi RPH (5/15/2019)   Next INR check:   2020   Priority:   Maintenance   Target end date:   Indefinite    Indications    PAF (paroxysmal atrial fibrillation) (CMS/[facility]) [I48.0]             Anticoagulation Episode Summary     INR check location:       Preferred lab:       Send INR reminders to:    [facility]    Comments:         Anticoagulation Care Providers     Provider Role Specialty Phone number    Rommel Veliz MD Referring Cardiology [phone]          Clinic Interview:  Patient Findings     Negatives:   Signs/symptoms of thrombosis, Signs/symptoms of bleeding,   Laboratory test error suspected, Change in health, Change in alcohol use,   Change in activity, Upcoming invasive procedure, Emergency department   visit, Upcoming dental procedure, Missed doses, Extra doses, Change in   medications, Change in diet/appetite, Hospital admission, Bruising, Other   complaints      Clinical Outcomes     Negatives:   Major bleeding event, Thromboembolic event,   Anticoagulation-related hospital admission, Anticoagulation-related ED   visit, Anticoagulation-related fatality        INR History:  Anticoagulation Monitoring 2019   INR 2.6 5.0 2.8   INR Date 2019   INR Goal 2.0-3.0 2.0-3.0 2.0-3.0   Trend Same Same Same   Last Week Total 24 mg 24 mg 18 mg   Next Week Total 24 mg 18 mg 24 mg   Sun 4 mg 4 mg 4 mg   Mon 4 mg 4 mg 4 mg   Tue 2 mg 2 mg 2 mg   Wed 4 mg 4 mg 4 mg   Thu 4 mg Hold () 4 mg   Fri 4 mg 2 mg () 4 mg   Sat 2 mg 2 mg 2 mg   Visit Report - - -   Some  recent data might be hidden       Plan:  1. INR is Therapeutic today- see above in Anticoagulation Summary.  Will instruct Caitlyn Longoria to Continue their warfarin regimen- see above in Anticoagulation Summary.  2. Follow up in 3 weeks  3. Patient declines warfarin refills.  4. Verbal and written information provided. Patient expresses understanding and has no further questions at this time.    Jovita Small, Pharmacy Intern

## 2019-12-19 NOTE — PROGRESS NOTES
I have supervised and reviewed the notes, assessments, and/or procedures performed by our PharmD Candidate. The documented assessment and plan were developed cooperatively, and the plan was implemented in my presence. I concur with the documentation of this patient encounter.    Pao Levi [facility]

## 2019-12-30 RX ORDER — LOSARTAN POTASSIUM 25 MG/1
TABLET ORAL
Qty: 90 TABLET | Refills: 1 | Status: SHIPPED | OUTPATIENT
Start: 2019-12-30 | End: 2020-07-13

## 2020-01-07 ENCOUNTER — OFFICE VISIT (OUTPATIENT)
Dept: CARDIOLOGY | Facility: CLINIC | Age: 85
End: 2020-01-07

## 2020-01-07 ENCOUNTER — ANTICOAGULATION VISIT (OUTPATIENT)
Dept: PHARMACY | Facility: HOSPITAL | Age: 85
End: 2020-01-07

## 2020-01-07 VITALS
WEIGHT: 227 LBS | HEIGHT: 64 IN | HEART RATE: 65 BPM | DIASTOLIC BLOOD PRESSURE: 60 MMHG | SYSTOLIC BLOOD PRESSURE: 110 MMHG | BODY MASS INDEX: 38.76 KG/M2

## 2020-01-07 DIAGNOSIS — E11.59 TYPE 2 DIABETES MELLITUS WITH OTHER CIRCULATORY COMPLICATION, WITHOUT LONG-TERM CURRENT USE OF INSULIN (HCC): ICD-10-CM

## 2020-01-07 DIAGNOSIS — Z95.0 PACEMAKER: ICD-10-CM

## 2020-01-07 DIAGNOSIS — I65.23 CAROTID ARTERY PLAQUE, BILATERAL: ICD-10-CM

## 2020-01-07 DIAGNOSIS — I48.0 PAF (PAROXYSMAL ATRIAL FIBRILLATION) (HCC): ICD-10-CM

## 2020-01-07 DIAGNOSIS — I25.5 CARDIOMYOPATHY, ISCHEMIC: ICD-10-CM

## 2020-01-07 DIAGNOSIS — I49.5 SICK SINUS SYNDROME (HCC): ICD-10-CM

## 2020-01-07 DIAGNOSIS — I44.2 THIRD DEGREE AV BLOCK (HCC): ICD-10-CM

## 2020-01-07 DIAGNOSIS — I50.22 CHRONIC SYSTOLIC CHF (CONGESTIVE HEART FAILURE), NYHA CLASS 3 (HCC): ICD-10-CM

## 2020-01-07 DIAGNOSIS — Z51.81 ENCOUNTER FOR THERAPEUTIC DRUG LEVEL MONITORING: Primary | ICD-10-CM

## 2020-01-07 DIAGNOSIS — I25.10 CORONARY ARTERY DISEASE INVOLVING NATIVE CORONARY ARTERY OF NATIVE HEART WITHOUT ANGINA PECTORIS: ICD-10-CM

## 2020-01-07 LAB
INR PPP: 4.3 (ref 0.91–1.09)
PROTHROMBIN TIME: 51.1 SECONDS (ref 10–13.8)

## 2020-01-07 PROCEDURE — 93000 ELECTROCARDIOGRAM COMPLETE: CPT | Performed by: NURSE PRACTITIONER

## 2020-01-07 PROCEDURE — 36416 COLLJ CAPILLARY BLOOD SPEC: CPT

## 2020-01-07 PROCEDURE — G0463 HOSPITAL OUTPT CLINIC VISIT: HCPCS

## 2020-01-07 PROCEDURE — 85610 PROTHROMBIN TIME: CPT

## 2020-01-07 PROCEDURE — 99214 OFFICE O/P EST MOD 30 MIN: CPT | Performed by: NURSE PRACTITIONER

## 2020-01-07 NOTE — PROGRESS NOTES
Date of Office Visit: 20  Encounter Provider: KRISTIN Frey  Place of Service: [facility] CARDIOLOGY  Patient Name: Caitlyn Longoria  :1934    Chief Complaint   Patient presents with   • Coronary Artery Disease   • Follow-up   :     HPI: Caitlyn Longoria is a 85 y.o. female  with history of hypertension, hyperlipidemia, atrial fibrillation, coronary artery disease with history of stent, CVA, asthma, oropharyngeal dysphagia, chronic systolic and diastolic congestive heart failure, ischemic cardiomyopathy, chronic kidney disease stage III, and diabetes mellitus. She is followed by Dr. Veliz. I will see her in follow up today.      In 2007 it appeared that she had an anterior infarct.  She had an echocardiogram that confirmed this and ultimately had cardiac catheterization on 2008, which confirmed a left ventricular ejection fraction of 35%, total occlusion of the mid left anterior descending, severe disease of the large diagonal, and insignificant disease of the right coronary artery and circumflex.  She had angioplasty and drug-eluting stent placement of the diagonal.  Unfortunately, she developed a right femoral pseudoaneurysm and had to have that surgically repaired.  Follow-up echocardiogram in  showed left ventricular function have returned to normal.     In 2011 she presented with increasing shortness of breath and was found to have some congestive heart failure, atrial flutter, and marked bradycardia.  Echocardiogram at that time showed preserved LV function.  He had a dual-chamber pacemaker implanted.  She underwent atrial flutter ablation in 2011.     In 2012 she presented with dyspnea area she underwent cardiac catheterization which showed elevated right-sided pressures with a PA systolic pressure of 53mmHg, mean 31, and elevated wedge at 26.  She hadin-stent stenosis of the diagonal vessel.  The total occlusion of the  mid LAD was unchanged.  The EF was approximately 50% and she underwent angioplasty and drug-eluting stent to the diagonal vessel, again.  Her medications were adjusted and then she developed dizziness so her Lasix was cut back.  She had a couple episodes of passing out that were attributed to low blood pressure     She then had the RV lead revised in October 2014.     In November 2016 she had some shortness of breath.  She had a perfusion stress test that showed a very small area of ischemia in the septal wall and an echocardiogram that revealed a left ventricular ejection fraction of approximately 45%significant valvular disease.  Turned out, she had reflux with aspiration and needed treatment for that.     In October 2017 she had neuro changes.  CT of the head and neck show no evidence of acute infarction or hemorrhage.  She was noted to have small vessel ischemic disease and age appropriate atrophic she has significant dysarthria which resolved today prior to discharge and was started on Plavix.  She then had a conjunctival hemorrhage and was told to take aspirin 81 mg a remain on Coumadin instead.  Atherosclerotic disease involving the carotid bifurcation was noted bilaterally.  Echocardiogram in December 2017 showedseverely decreased left ventricular systolic function with calculated EF of 23.4%, grade 1 diastolic dysfunction, mild LVH, moderately thickened aortic valve, moderate MAC, and mild mitral valve regurgitation.  She was unable to have a MRI due to her pacer.  In January 2018, she had sepsis of the right knee joint. ID recommended 6 weeks of IV antibiotics and she was discharged with a PICC line.   She then developed lymphedema.     She had repeat echo in 05/2018 which showed left ventricular ejection fraction of 35% - 40%.    She later moved into an apartment community.  In August 2018 she had been taken off Bumex and put on Lasix due to worsening renal function.  In September 2018 she was  hospitalized for exacerbation of her lung disease.  In October 2019 she was weighing herself routinely and her weights have trended down.    She presents today for routine follow-up.  She continues to complain of cough and recently saw her allergist who prescribed her azithromycin.  She does not have any real relief of the cough.  She is to see speech therapy as recommended by her pulmonologist.  They recommended a swallow eval.  Her INR was 4.0 today so adjustments were made.  She had some dietary indiscretion with the holidays and her weight had trended up so she doubled her Lasix to twice daily and they are back down to her baseline of 225-227.  She has had no chest pain tightness pressure.  She denies palpitation.  Lower extremity edema has improved with increased Lasix over the past week.  She continues to have shortness of breath and occasional wheezing secondary to her asthma.  She has baseline fatigue.  She continues to live at living facility and ambulates with walker.  She denies any recent falls.                  Allergies   Allergen Reactions   • Accupril [Quinapril Hcl] Delirium     Swelling, HA,, confusion and constipation per pt   • Ahist [Chlorpheniramine] Nausea Only, Other (See Comments) and Dizziness     Headache, Blurred vision   • Chlorcyclizine Unknown (See Comments)   • Clarithromycin Nausea Only     Other reaction(s): Headache, Depression, Flushed   • Diclofenac Sodium Unknown (See Comments)   • Diphenhydramine      Benadryl   • Esomeprazole GI Intolerance     Nexium. Stomach issues   • Ibuprofen Other (See Comments)     Does not recall    • Levalbuterol Swelling     Xopenex   • Levocetirizine Other (See Comments)     Xyzal. Diarrhea, Stomach cramps   • Lipitor [Atorvastatin] Other (See Comments)     Muscle pain and weakness, dark urine   • Lodine [Etodolac]    • Omeprazole Nausea Only     Prilosec. Headache   • Pravachol [Pravastatin] Nausea Only and Other (See Comments)     Bloated,  "Constipation, Headaches   • Quinapril Swelling and Confusion     Accupril. Headaches, constipation   • Sulfa Antibiotics    • Sulindac Myalgia     Other reaction(s): Headache, joint pain, bruising   • Valdecoxib Irritability   • Prednisone Rash       Past Medical History:   Diagnosis Date   • Aortic calcification (CMS/HCC)     mild, on echo 12/17/2017   • Aortic regurgitation     Trace   • Asthma    • CAD (coronary artery disease)    • Chronic combined systolic and diastolic congestive heart failure (CMS/[facility])    • CKD (chronic kidney disease) stage 3, GFR 30-59 ml/min (CMS/[facility])    • Coronary artery disease involving native coronary artery of native heart with angina pectoris (CMS/HCC)    • DM type 2 (diabetes mellitus, type 2) (CMS/[facility])    • Hypertension    • Mild mitral regurgitation    • Mitral annular calcification     12/8/2017- echo, moderate   • PAF (paroxysmal atrial fibrillation) (CMS/[facility])    • SSS (sick sinus syndrome) (CMS/[facility])    • Tricuspid regurgitation     Trace       Past Surgical History:   Procedure Laterality Date   • CARDIAC CATHETERIZATION     • CHOLECYSTECTOMY     • CORONARY STENT PLACEMENT     • HERNIA REPAIR     • HYSTERECTOMY     • PACEMAKER IMPLANTATION     • REPLACEMENT TOTAL KNEE           Family and social history reviewed.     Review of Systems   Constitution: Positive for malaise/fatigue.   Cardiovascular: Positive for dyspnea on exertion.   Respiratory: Positive for cough and wheezing.    Neurological: Positive for light-headedness.     All other systems were reviewed and are negative          Objective:     Vitals:    01/07/20 1005   BP: 110/60   BP Location: Left arm   Patient Position: Sitting   Pulse: 65   Weight: 103 kg (227 lb)   Height: 162.6 cm (64\")     Body mass index is 38.96 kg/m².    PHYSICAL EXAM:  Physical Exam   Constitutional: She is oriented to person, place, and time. She appears well-developed and well-nourished. No distress.   HENT:   Head: Normocephalic.   Eyes: " Conjunctivae are normal.   Neck: Normal range of motion. No JVD present.   Cardiovascular: Normal rate, regular rhythm, normal heart sounds and intact distal pulses.   No murmur heard.  Pulses:       Carotid pulses are 2+ on the right side, and 2+ on the left side.       Radial pulses are 2+ on the right side, and 2+ on the left side.        Posterior tibial pulses are 2+ on the right side, and 2+ on the left side.   Pulmonary/Chest: Effort normal. No respiratory distress. She has wheezes in the right lower field. She has no rhonchi. She has no rales. She exhibits no tenderness.   Abdominal: Soft. Bowel sounds are normal. She exhibits no distension.   Musculoskeletal: Normal range of motion. She exhibits edema (nonpitting).   Neurological: She is alert and oriented to person, place, and time.   Skin: Skin is warm, dry and intact. No rash noted. She is not diaphoretic. No cyanosis.   Psychiatric: She has a normal mood and affect. Her behavior is normal. Judgment and thought content normal.         ECG 12 Lead  Date/Time: 1/7/2020 10:16 AM  Performed by: Darling Rodney APRN  Authorized by: Darling Rodney APRN   Comparison: compared with previous ECG   Similar to previous ECG  Rhythm: paced  Pacing: ventricular paced rhythm  Clinical impression: abnormal EKG            Current Outpatient Medications   Medication Sig Dispense Refill   • acetaminophen (TYLENOL) 650 MG 8 hr tablet Take 650 mg by mouth 2 (Two) Times a Day.     • Acetylcysteine 500 MG capsule Take 1,000 mg by mouth Daily With Dinner.     • albuterol sulfate  (90 Base) MCG/ACT inhaler Inhale 2 puffs Every 4 (Four) Hours As Needed for Wheezing. 1 inhaler 3   • aspirin 81 MG tablet Take 81 mg by mouth Daily.     • Calcium Carb-Cholecalciferol (CALCIUM 500+D) 500-200 MG-UNIT tablet Take 1 tablet by mouth 2 (Two) Times a Day.     • carvedilol (COREG) 3.125 MG tablet Take 1 tablet by mouth 2 (Two) Times a Day. 180 tablet 3   • cefadroxil (DURICEF) 500 MG  capsule Take 500 mg by mouth 2 (Two) Times a Day. Taken consistently for MSSA infection     • cetirizine (ZyrTEC) 10 MG tablet Take 10 mg by mouth 2 (Two) Times a Day.     • fluticasone (FLONASE) 50 MCG/ACT nasal spray 1 spray into the nostril(s) as directed by provider 2 (Two) Times a Day.     • Fluticasone-Umeclidin-Vilant (TRELEGY ELLIPTA IN) Inhale. As directed     • furosemide (LASIX) 20 MG tablet Take 20 mg by mouth Every Morning.     • glipiZIDE (GLUCOTROL) 5 MG tablet Take 2.5 mg by mouth Every Morning.     • loperamide (IMODIUM) 2 MG capsule Take 2 mg by mouth Daily. After first bowel movement     • losartan (COZAAR) 25 MG tablet TAKE ONE TABLET BY MOUTH DAILY 90 tablet 1   • Omega-3 Fatty Acids (FISH OIL) 1000 MG capsule capsule Take 1,000 mg by mouth 2 (Two) Times a Day With Meals.     • oxazepam (SERAX) 10 MG capsule Take 1 capsule by mouth Every Night. 5 capsule 0   • oxybutynin XL (DITROPAN-XL) 10 MG 24 hr tablet Take 10 mg by mouth every night at bedtime.     • polyethyl glycol-propyl glycol (LUBRICATING EYE DROPS) 0.4-0.3 % solution ophthalmic solution Administer 1 drop to both eyes Every 1 (One) Hour As Needed.     • rosuvastatin (CRESTOR) 20 MG tablet Take 20 mg by mouth Every Night.     • Umeclidinium Bromide (INCRUSE ELLIPTA) 62.5 MCG/INH aerosol powder  Inhale 1 each Daily.     • warfarin (COUMADIN) 4 MG tablet Take one tablet by mouth daily or as directed. 90 tablet 0   • pantoprazole (PROTONIX) 40 MG EC tablet Take 1 tablet by mouth 2 (Two) Times a Day. 60 tablet 1   • predniSONE (DELTASONE) 5 MG tablet Take 5 mg by mouth Take As Directed.       No current facility-administered medications for this visit.      Assessment:       Diagnosis Plan   1. Encounter for therapeutic drug level monitoring  Basic Metabolic Panel   2. Coronary artery disease involving native coronary artery of native heart without angina pectoris     3. Cardiomyopathy, ischemic     4. Type 2 diabetes mellitus with other  circulatory complication, without long-term current use of insulin (CMS/[facility])     5. Sick sinus syndrome (CMS/HCC)     6. Third degree AV block (CMS/HCC)     7. Pacemaker     8. PAF (paroxysmal atrial fibrillation) (CMS/[facility])     9. Carotid artery plaque, bilateral     10. Chronic systolic CHF (congestive heart failure), NYHA class 3 (CMS/[facility])          Orders Placed This Encounter   Procedures   • Basic Metabolic Panel     Standing Status:   Future     Standing Expiration Date:   1/7/2021   • ECG 12 Lead     This order was created via procedure documentation         Plan:     1.85 year old female with history of coronary disease involving the mid left anterior descending with total occlusion of that.  First diagonal vessel with severe disease status post angioplasty and drug-eluting stent placement.  Follow-up In October 2012 showed restenosis of the diagonal vessel and repeat angioplasty and drug-eluting stent placement was completed.  EF was approximately 50% with segmental wall motion abnormality.  Echocardiogram in December 2017 was 23.4% she was started on carvedilol and losartan.  Echocardiogram May 2018 showed improvement to 35-40%.    2.  Atrial fibrillation/sick sinus syndrome status post permanent pacemaker implantation status post flutter ablation status post RV lead revision.  No bleeding issues on warfarin following in our anticoagulation clinic  3.  History of CVA in December 2000 likely embolic from atrial fibrillation she was intolerant to Plavix. She continues on ASA  4.  Diabetes mellitus hemoglobin A1c goal 7.0 or less  5.  Hyperlipidemia on target dose rosuvastatin 20 mg  6.  Obstructive sleep apnea reports compliance on CPAP followed by Dr. Coburn  7.  History of pulmonary hypertension   8.  Obesity BMI 39.0  9. History of syncope secondary to hypotension no recurrence  10.  Ischemic cardiomyopathy left ventricular ejection fraction improved from 23 %to 35-40% in 05/2018.   Weights were increased a  little over a week ago due to some dietary indiscretion.  This improved by doubling Lasix twice daily.  Her weights are now at baseline and she is to resume 20 mg daily dosing.  She is to continue following daily weights call if she gains 2 to 3 pounds overnight or 5 pounds in a week.  Is difficult for her to avoid added salt because she does not prepare her meals at the living facility.  She makes attempt to eat smaller portion sizes.  11.  Chronic kidney disease on lasix 20 mg and volume status now stable. Switched off Bumex in 2018 for worsening renal function.  Plan for repeat BMP next week when she has repeat INR  12.  Bilateral carotid plaque without stenosis on duplex and 12/2017  13. 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  Oropharyngeal dysphasia mild on video swallow August 2019 unchanged from 2016.  She has been referred back to speech therapy  15. Chronic cough undergoing evaluation, as listed above     Follow up in 6 months call with questions or concerns             It has been a pleasure to participate in this patient's care.      Thank you,  KRISTIN Frey      **I used Dragon to dictate this note:**

## 2020-01-07 NOTE — PROGRESS NOTES
Anticoagulation Clinic Progress Note    Anticoagulation Summary  As of 2020    INR goal:   2.0-3.0   TTR:   78.5 % (1.3 y)   INR used for dosin.3! (2020)   Warfarin maintenance plan:   4 mg every Mon, Wed, Fri; 2 mg all other days   Weekly warfarin total:   20 mg   Plan last modified:   Alexander Valiente RPH (2020)   Next INR check:   2020   Priority:   Maintenance   Target end date:   Indefinite    Indications    PAF (paroxysmal atrial fibrillation) (CMS/[facility]) [I48.0]             Anticoagulation Episode Summary     INR check location:       Preferred lab:       Send INR reminders to:    [facility]    Comments:         Anticoagulation Care Providers     Provider Role Specialty Phone number    Rommel Veliz MD Referring Cardiology [phone]          Clinic Interview:  Patient Findings     Positives:   Change in medications    Negatives:   Signs/symptoms of thrombosis, Signs/symptoms of bleeding,   Laboratory test error suspected, Change in health, Change in alcohol use,   Change in activity, Upcoming invasive procedure, Emergency department   visit, Upcoming dental procedure, Missed doses, Extra doses, Change in   diet/appetite, Hospital admission, Bruising, Other complaints    Comments:   Started azithromycin / - take one tablet daily x 3 days,   then hold x 4 days; repeat x 5 wks.      Clinical Outcomes     Negatives:   Major bleeding event, Thromboembolic event,   Anticoagulation-related hospital admission, Anticoagulation-related ED   visit, Anticoagulation-related fatality    Comments:   Started azithromycin 1/1 - take one tablet daily x 3 days,   then hold x 4 days; repeat x 5 wks.        INR History:  Anticoagulation Monitoring 2019   INR 5.0 2.8 4.3   INR Date 2019   INR Goal 2.0-3.0 2.0-3.0 2.0-3.0   Trend Same Same Down   Last Week Total 24 mg 18 mg 24 mg   Next Week Total 18 mg 24 mg 16 mg   Sun 4 mg 4 mg 2 mg    Mon 4 mg 4 mg 4 mg   Tue 2 mg 2 mg Hold (1/7)   Wed 4 mg 4 mg 2 mg (1/8)   Thu Hold (12/12) 4 mg 2 mg   Fri 2 mg (12/13) 4 mg 4 mg   Sat 2 mg 2 mg 2 mg   Visit Report - - -   Some recent data might be hidden       Plan:  1. INR is Supratherapeutic today- see above in Anticoagulation Summary.  Will instruct Caitlyn Longoria to Change their warfarin regimen- see above in Anticoagulation Summary.  2. Follow up in 1 week  3. Patient declines warfarin refills.  4. Verbal and written information provided. Patient expresses understanding and has no further questions at this time.    Alexander Valiente [facility]

## 2020-01-08 ENCOUNTER — CLINICAL SUPPORT NO REQUIREMENTS (OUTPATIENT)
Dept: CARDIOLOGY | Facility: CLINIC | Age: 85
End: 2020-01-08

## 2020-01-08 DIAGNOSIS — I49.5 SICK SINUS SYNDROME (HCC): Primary | ICD-10-CM

## 2020-01-08 PROCEDURE — 93296 REM INTERROG EVL PM/IDS: CPT | Performed by: INTERNAL MEDICINE

## 2020-01-08 PROCEDURE — 93294 REM INTERROG EVL PM/LDLS PM: CPT | Performed by: INTERNAL MEDICINE

## 2020-01-09 ENCOUNTER — TELEPHONE (OUTPATIENT)
Dept: CARDIOLOGY | Facility: CLINIC | Age: 85
End: 2020-01-09

## 2020-01-09 DIAGNOSIS — I48.0 PAF (PAROXYSMAL ATRIAL FIBRILLATION) (HCC): Primary | ICD-10-CM

## 2020-01-09 NOTE — TELEPHONE ENCOUNTER
Patient has a dual chamber [employer]onic Adapta pacemaker, IAN as of 12/15/19. A&V leads are MRI compatible, however she has an abandoned RV lead in place. Order needed for replacement.

## 2020-01-13 ENCOUNTER — TRANSCRIBE ORDERS (OUTPATIENT)
Dept: CARDIOLOGY | Facility: CLINIC | Age: 85
End: 2020-01-13

## 2020-01-13 DIAGNOSIS — Z79.01 LONG TERM (CURRENT) USE OF ANTICOAGULANTS: Primary | ICD-10-CM

## 2020-01-13 DIAGNOSIS — Z13.6 SCREENING FOR ISCHEMIC HEART DISEASE: ICD-10-CM

## 2020-01-13 DIAGNOSIS — Z01.810 PRE-OPERATIVE CARDIOVASCULAR EXAMINATION: Primary | ICD-10-CM

## 2020-01-14 ENCOUNTER — TELEPHONE (OUTPATIENT)
Dept: CARDIOLOGY | Facility: CLINIC | Age: 85
End: 2020-01-14

## 2020-01-14 ENCOUNTER — TRANSCRIBE ORDERS (OUTPATIENT)
Dept: LAB | Facility: HOSPITAL | Age: 85
End: 2020-01-14

## 2020-01-14 ENCOUNTER — LAB (OUTPATIENT)
Dept: LAB | Facility: HOSPITAL | Age: 85
End: 2020-01-14

## 2020-01-14 ENCOUNTER — ANTICOAGULATION VISIT (OUTPATIENT)
Dept: PHARMACY | Facility: HOSPITAL | Age: 85
End: 2020-01-14

## 2020-01-14 DIAGNOSIS — K21.00 GASTRO-ESOPHAGEAL REFLUX DISEASE WITH ESOPHAGITIS: Primary | ICD-10-CM

## 2020-01-14 DIAGNOSIS — J45.50 SEVERE PERSISTENT ASTHMA, UNCOMPLICATED: ICD-10-CM

## 2020-01-14 DIAGNOSIS — Z79.01 LONG TERM (CURRENT) USE OF ANTICOAGULANTS: ICD-10-CM

## 2020-01-14 DIAGNOSIS — R05.9 COUGH: ICD-10-CM

## 2020-01-14 DIAGNOSIS — J32.9 CHRONIC SINUSITIS, UNSPECIFIED LOCATION: ICD-10-CM

## 2020-01-14 DIAGNOSIS — I48.0 PAF (PAROXYSMAL ATRIAL FIBRILLATION) (HCC): ICD-10-CM

## 2020-01-14 DIAGNOSIS — Z51.81 ENCOUNTER FOR THERAPEUTIC DRUG LEVEL MONITORING: ICD-10-CM

## 2020-01-14 LAB
ANION GAP SERPL CALCULATED.3IONS-SCNC: 13 MMOL/L (ref 5–15)
BASOPHILS # BLD MANUAL: 0.1 10*3/MM3 (ref 0–0.2)
BASOPHILS NFR BLD AUTO: 1 % (ref 0–1.5)
BUN BLD-MCNC: 20 MG/DL (ref 8–23)
BUN/CREAT SERPL: 20.6 (ref 7–25)
CALCIUM SPEC-SCNC: 9 MG/DL (ref 8.6–10.5)
CHLORIDE SERPL-SCNC: 108 MMOL/L (ref 98–107)
CO2 SERPL-SCNC: 24 MMOL/L (ref 22–29)
CREAT BLD-MCNC: 0.97 MG/DL (ref 0.57–1)
DEPRECATED RDW RBC AUTO: 44.5 FL (ref 37–54)
EOSINOPHIL # BLD MANUAL: 0.1 10*3/MM3 (ref 0–0.4)
EOSINOPHIL NFR BLD MANUAL: 1 % (ref 0.3–6.2)
ERYTHROCYTE [DISTWIDTH] IN BLOOD BY AUTOMATED COUNT: 12.4 % (ref 12.3–15.4)
GFR SERPL CREATININE-BSD FRML MDRD: 55 ML/MIN/1.73
GLUCOSE BLD-MCNC: 101 MG/DL (ref 65–99)
HCT VFR BLD AUTO: 39.5 % (ref 34–46.6)
HGB BLD-MCNC: 12.9 G/DL (ref 12–15.9)
INR PPP: 2 (ref 0.91–1.09)
LYMPHOCYTES # BLD MANUAL: 5.52 10*3/MM3 (ref 0.7–3.1)
LYMPHOCYTES NFR BLD MANUAL: 3 % (ref 5–12)
LYMPHOCYTES NFR BLD MANUAL: 54 % (ref 19.6–45.3)
MCH RBC QN AUTO: 32.1 PG (ref 26.6–33)
MCHC RBC AUTO-ENTMCNC: 32.7 G/DL (ref 31.5–35.7)
MCV RBC AUTO: 98.3 FL (ref 79–97)
MONOCYTES # BLD AUTO: 0.31 10*3/MM3 (ref 0.1–0.9)
NEUTROPHILS # BLD AUTO: 3.37 10*3/MM3 (ref 1.7–7)
NEUTROPHILS NFR BLD MANUAL: 33 % (ref 42.7–76)
PLAT MORPH BLD: NORMAL
PLATELET # BLD AUTO: 174 10*3/MM3 (ref 140–450)
PMV BLD AUTO: 9.5 FL (ref 6–12)
POTASSIUM BLD-SCNC: 4 MMOL/L (ref 3.5–5.2)
PROTHROMBIN TIME: 24.6 SECONDS (ref 10–13.8)
RBC # BLD AUTO: 4.02 10*6/MM3 (ref 3.77–5.28)
RBC MORPH BLD: NORMAL
SMUDGE CELLS BLD QL SMEAR: ABNORMAL
SODIUM BLD-SCNC: 145 MMOL/L (ref 136–145)
VARIANT LYMPHS NFR BLD MANUAL: 8 % (ref 0–5)
WBC NRBC COR # BLD: 10.22 10*3/MM3 (ref 3.4–10.8)

## 2020-01-14 PROCEDURE — 36415 COLL VENOUS BLD VENIPUNCTURE: CPT

## 2020-01-14 PROCEDURE — 85025 COMPLETE CBC W/AUTO DIFF WBC: CPT

## 2020-01-14 PROCEDURE — 36416 COLLJ CAPILLARY BLOOD SPEC: CPT

## 2020-01-14 PROCEDURE — G0463 HOSPITAL OUTPT CLINIC VISIT: HCPCS

## 2020-01-14 PROCEDURE — 85610 PROTHROMBIN TIME: CPT

## 2020-01-14 PROCEDURE — 80048 BASIC METABOLIC PNL TOTAL CA: CPT

## 2020-01-14 PROCEDURE — 85007 BL SMEAR W/DIFF WBC COUNT: CPT

## 2020-01-14 PROCEDURE — 82785 ASSAY OF IGE: CPT

## 2020-01-17 ENCOUNTER — TRANSCRIBE ORDERS (OUTPATIENT)
Dept: SPEECH THERAPY | Facility: HOSPITAL | Age: 85
End: 2020-01-17

## 2020-01-17 DIAGNOSIS — R13.10 DYSPHAGIA, UNSPECIFIED TYPE: Primary | ICD-10-CM

## 2020-01-17 LAB — TOTAL IGE SMQN RAST: 11 IU/ML (ref 6–495)

## 2020-01-21 ENCOUNTER — ANTICOAGULATION VISIT (OUTPATIENT)
Dept: PHARMACY | Facility: HOSPITAL | Age: 85
End: 2020-01-21

## 2020-01-21 DIAGNOSIS — I48.0 PAF (PAROXYSMAL ATRIAL FIBRILLATION) (HCC): ICD-10-CM

## 2020-01-21 LAB
INR PPP: 2.1 (ref 0.91–1.09)
PROTHROMBIN TIME: 25 SECONDS (ref 10–13.8)

## 2020-01-21 PROCEDURE — G0463 HOSPITAL OUTPT CLINIC VISIT: HCPCS

## 2020-01-21 PROCEDURE — 36416 COLLJ CAPILLARY BLOOD SPEC: CPT

## 2020-01-21 PROCEDURE — 85610 PROTHROMBIN TIME: CPT

## 2020-01-21 NOTE — PROGRESS NOTES
Anticoagulation Clinic Progress Note    Anticoagulation Summary  As of 2020    INR goal:   2.0-3.0   TTR:   78.3 % (1.3 y)   INR used for dosin.1 (2020)   Warfarin maintenance plan:   4 mg every Mon, Wed, Fri; 2 mg all other days   Weekly warfarin total:   20 mg   Plan last modified:   Alexander Valiente RPH (2020)   Next INR check:   2020   Priority:   Maintenance   Target end date:   Indefinite    Indications    PAF (paroxysmal atrial fibrillation) (CMS/[facility]) [I48.0]             Anticoagulation Episode Summary     INR check location:       Preferred lab:       Send INR reminders to:    [facility] PO[facility]    Comments:         Anticoagulation Care Providers     Provider Role Specialty Phone number    Rommel Veliz MD Referring Cardiology [phone]          Clinic Interview:  Patient Findings     Positives:   Upcoming invasive procedure, Change in medications    Negatives:   Signs/symptoms of thrombosis, Signs/symptoms of bleeding,   Laboratory test error suspected, Change in health, Change in alcohol use,   Change in activity, Emergency department visit, Upcoming dental procedure,   Missed doses, Extra doses, Change in diet/appetite, Hospital admission,   Bruising, Other complaints    Comments:   Pacemaker procedure ; reports being instructed to hold x 4   days prior. Will take last dose of azithromycin on 20.       Clinical Outcomes     Negatives:   Major bleeding event, Thromboembolic event,   Anticoagulation-related hospital admission, Anticoagulation-related ED   visit, Anticoagulation-related fatality    Comments:   Pacemaker procedure ; reports being instructed to hold x 4   days prior. Will take last dose of azithromycin on 20.         INR History:  Anticoagulation Monitoring 2020   INR 4.3 2.0 2.1   INR Date 2020   INR Goal 2.0-3.0 2.0-3.0 2.0-3.0   Trend Down Same Same   Last Week Total 24 mg 16 mg 20 mg    Next Week Total 16 mg 20 mg 20 mg   Sun 2 mg 2 mg 4 mg (2/9, 2/16); Otherwise 2 mg   Mon 4 mg 4 mg Hold (2/3); Otherwise 4 mg   Tue Hold (1/7) 2 mg Hold (2/4); Otherwise 2 mg   Wed 2 mg (1/8) 4 mg Hold (2/5); Otherwise 4 mg   Thu 2 mg 2 mg Hold (2/6), 4 mg (2/13); Otherwise 2 mg   Fri 4 mg 4 mg 4 mg   Sat 2 mg 2 mg 4 mg (2/8); Otherwise 2 mg   Visit Report - - -   Some recent data might be hidden       Plan:  1. INR is Therapeutic today- see above in Anticoagulation Summary.  Will instruct Caitlyn Longoria to Change their warfarin regimen- see above in Anticoagulation Summary. Continue 4 mg MWF, 2 mg rest of wk until holding for procedure x 4 days. Following completion of azithromycin and procedure, resume pre-azithromycin warfarin dose + boost dose x 1 day.  2. Follow up in 10 days following procedure per pt availability  3. Patient declines warfarin refills.  4. Verbal and written information provided. Patient expresses understanding and has no further questions at this time.    Alexander Valiente [facility]

## 2020-01-23 ENCOUNTER — HOSPITAL ENCOUNTER (OUTPATIENT)
Dept: SPEECH THERAPY | Facility: HOSPITAL | Age: 85
Setting detail: THERAPIES SERIES
Discharge: HOME | End: 2020-01-23

## 2020-01-23 DIAGNOSIS — R49.0 DYSPHONIA: ICD-10-CM

## 2020-01-23 DIAGNOSIS — Z86.73 HISTORY OF CVA (CEREBROVASCULAR ACCIDENT): ICD-10-CM

## 2020-01-23 DIAGNOSIS — K21.00 GERD WITH ESOPHAGITIS: ICD-10-CM

## 2020-01-23 DIAGNOSIS — J32.9 CHRONIC SINUSITIS, UNSPECIFIED LOCATION: ICD-10-CM

## 2020-01-23 DIAGNOSIS — R05.9 COUGH: ICD-10-CM

## 2020-01-23 DIAGNOSIS — J45.50 SEVERE PERSISTENT ASTHMA WITHOUT COMPLICATION: ICD-10-CM

## 2020-01-23 DIAGNOSIS — R13.12 OROPHARYNGEAL DYSPHAGIA: Primary | ICD-10-CM

## 2020-01-23 PROCEDURE — 92612 ENDOSCOPY SWALLOW (FEES) VID: CPT

## 2020-01-24 NOTE — MBS/VFSS/FEES
Outpatient Speech Language Pathology   Adult Swallow Initial Evaluation - FEES  [facility]     Patient Name: Caitlyn Longoria  : 1934  MRN: [id number]  Today's Date: 2020         Visit Date: 2020   Patient Active Problem List   Diagnosis   • Neck and shoulder pain   • Arthropathy of shoulder region   • Carpal tunnel syndrome of left wrist   • Chronic pain of both shoulders   • Chronic left shoulder pain   • Arthropathy of left shoulder   • Dysarthria   • DM type 2 (diabetes mellitus, type 2) (CMS/HCC)   • PAF (paroxysmal atrial fibrillation) (CMS/HCC)   • HLD (hyperlipidemia)   • Bronchitis   • Coronary artery disease involving native coronary artery of native heart with angina pectoris (CMS/HCC)   • CVA (cerebral vascular accident) (CMS/HCC)   • HTN (hypertension)   • CKD (chronic kidney disease) stage 3, GFR 30-59 ml/min (CMS/HCC)   • Chronic combined systolic and diastolic congestive heart failure (CMS/HCC)   • Infection of prosthetic right knee joint (CMS/HCC)   • Stasis dermatitis of right lower extremity due to peripheral venous hypertension   • Current use of long term anticoagulation   • Morbidly obese (CMS/HCC)   • Acute respiratory failure with hypoxia (CMS/HCC)        Past Medical History:   Diagnosis Date   • Aortic calcification (CMS/HCC)     mild, on echo 2017   • Aortic regurgitation     Trace   • Asthma    • CAD (coronary artery disease)    • Chronic combined systolic and diastolic congestive heart failure (CMS/HCC)    • CKD (chronic kidney disease) stage 3, GFR 30-59 ml/min (CMS/HCC)    • Coronary artery disease involving native coronary artery of native heart with angina pectoris (CMS/HCC)    • DM type 2 (diabetes mellitus, type 2) (CMS/HCC)    • Hypertension    • Mild mitral regurgitation    • Mitral annular calcification     2017- echo, moderate   • PAF (paroxysmal atrial fibrillation) (CMS/HCC)    • SSS (sick sinus syndrome) (CMS/HCC)    • Tricuspid  "regurgitation     Trace        Past Surgical History:   Procedure Laterality Date   • CARDIAC CATHETERIZATION     • CHOLECYSTECTOMY     • CORONARY STENT PLACEMENT     • HERNIA REPAIR     • HYSTERECTOMY     • PACEMAKER IMPLANTATION     • REPLACEMENT TOTAL KNEE           Visit Dx:     ICD-10-CM ICD-9-CM   1. Oropharyngeal dysphagia R13.12 787.22   2. Dysphonia R49.0 784.42   3. GERD with esophagitis K21.0 530.11   4. Cough R05 786.2   5. Chronic sinusitis, unspecified location J32.9 473.9   6. Severe persistent asthma without complication J45.50 493.90   7. History of CVA (cerebrovascular accident) Z86.73 V12.54       Patient History     Row Name 01/23/20 1400             History    Chief Complaint  Cough;Other 1 (comment) Trouble swallowing, Voice disorder  -HS      Date Current Problem(s) Began  08/07/19 Date of most recent VFSS   -HS      Brief Description of Current Complaint  History of mild oropharyngeal dysphagia per VFSS on 8/6/19. Patient reports choking and coughing with solids (like sphaghetti) and reports that her coughing gets worse when there is \"too much food in my stomach\". Order for FEES to r/o aspiration.   -HS      Patient/Caregiver Goals  Other (comment) Improved voice, breathing, swallowing  -HS      Hand Dominance  right-handed  -HS      Patient seeing anyone else for problem(s)?  yes  -HS      How has patient tried to help current problem?  Pulmonologist, Allergist  -HS      What clinical tests have you had for this problem?  CT scan;Modified Barium Swallow Study  -HS      Results of Clinical Tests  VFSS 8/6/19: mild oropharyngeal dysphagia. Chest CT 9/16/19: \"lungs clear except for calcified granuloma at left base\".   -HS      Related/Recent Hospitalizations  No  -HS      Are you or can you be pregnant  No  -HS         Pain     Pain at Present  0  -HS         Services    Are you currently receiving Home Health services  No  -HS      Do you plan to receive Home Health services in the near future "  No  -HS         Daily Activities    Primary Language  English  -HS      Does patient have problems with the following?  None  -HS      Barriers to learning  None  -HS      Pt Participated in POC and Goals  Yes  -HS         Safety    Are you being hurt, hit, or frightened by anyone at home or in your life?  No  -HS      Are you being neglected by a caregiver  No  -HS        User Key  (r) = Recorded By, (t) = Taken By, (c) = Cosigned By    Initials Name Provider Type    HS Eusebia Mayberry MS CCC-SLP Speech and Language Pathologist          SLP Adult Swallow Evaluation     Row Name 01/23/20 1400       Rehab Evaluation    Document Type  evaluation  -HS    Subjective Information  complains of;dyspnea  -HS    Patient Observations  alert;cooperative  -HS    Patient Effort  excellent  -HS    Symptoms Noted During/After Treatment  other (see comments) improved vocal quality at end of exam  -HS       General Information    Patient Profile Reviewed  yes  -HS    Pertinent History Of Current Problem  85 year old female referred to rule out aspiration. She has been seeing both pulmonology and her allergist due to chronic cough and trouble breathing. She reports onset April 2019. VFSS completed 8/6/19 revealed mild oropharyngeal dysphagia and recommended a regular diet with no mixed consistencies and thin liquids. History is also significant for stroke in 2007, GERD, asthma, hiatal hernia.   -HS    Current Method of Nutrition  regular textures;thin liquids;other (see comments) No mixed consistencies - patient compliant  -HS    Precautions/Limitations, Vision  WFL with corrective lenses  -HS    Precautions/Limitations, Hearing  WFL  -HS    Prior Level of Function-Communication  WFL  -HS    Prior Level of Function-Swallowing  no diet consistency restrictions  -HS    Plans/Goals Discussed with  patient;agreed upon  -HS    Barriers to Rehab  none identified  -HS    Patient's Goals for Discharge  eat/drink without coughing/choking  Improved voice and breathing  -HS       Oral Motor and Function    Dentition Assessment  natural, present and adequate  -HS    Secretion Management  WNL/WFL  -HS    Mucosal Quality  moist, healthy  -HS       Oral Musculature and Cranial Nerve Assessment    Oral Motor General Assessment  vocal impairment  -HS    Vocal Impairment, Detail. Cranial Nerve X (Vagus)  vocal quality abnormality (see comments)  -HS    Oral Motor, Comment  Moderate to severe dysphonia characterized by hoarseness, harshness, and glottal thrasher. Reduced breath support for phonation observed.   -HS       General Eating/Swallowing Observations    Eating/Swallowing Skills  fed by SLP;other (see comments) Fed by additional SLP  -HS    Positioning During Eating  upright in chair  -HS       Respiratory    Respiratory Status  room air  -HS    Respiratory Pattern  decrease control/incoordination of breathing swallow  -HS       Fiberoptic Endoscopic Evaluation of Swallowing (FEES)    Risks/Benefits Reviewed  risks/benefits explained;patient;agreed to eval  -HS    Nasal Entry  left:;other (see comments) Patient tolerated well. Posterior nasal turbinate fullness.   -HS    Special Considerations  Scope # [id number]  -HS       Anatomy and Physiology    Anatomic Considerations  edema;erythema;vocal folds;false vocal folds;arytenoids;other (see comments) interarytenoid pachydermia  -HS    Velopharyngeal  WFL  -HS    Base of Tongue  symmetrical;range adequate  -HS    Epiglottis  omega shape  -HS    Laryngeal Function Breathing  asymmetrical  -HS    Laryngeal Function Phonation  symmetrical  -HS    Laryngeal Function to Breath Hold  other (see comments) hyperfunctional; FVF closure, suspect incomplete TVF closure  -HS    Secretion Rating Scale (Brenden et al. 1996)  2- secretions initially outside the vestibule but later entered the vestibule  -HS    Secretion Description  thick;discolored  -HS    Ice Chips  elicited swallow;not aspirated;partially cleared secretions   "-HS    Spontaneous Swallow  frequency adequate  -HS    Sensory  reduced sensation  -HS    Utensils Used  Spoon;Cup;Straw  -HS    Consistencies Trialed  thin liquids;mixed consistency;mechanical soft;Regular textures  -HS       FEES Interpretation    Oral Phase  reduced lingual control;prolonged manipulation;prespill of liquids into pharynx;oral residue present  -HS    Oral Phase, Comment  Premature spillage to the pyriforms with thin liquids via cup, volume spilled increased with straw sips. Increased mastication time and tongue pumping with regular trials. One instance of oral residue falling over the epiglottis and into the laryngeal vestibule (see image below). Patient did sense penetrated material and coughed in response which cleared the material. Suspect this is an intermittent occurance but does correlate to patient's complaints of foods \"sticking and going down the wrong way but I can cough them up\".   -HS       Initiation of Pharyngeal Swallow    Initiation of Pharyngeal Swallow  bolus in valleculae;bolus in pyriform sinuses  -HS    Pharyngeal Phase  impaired pharyngeal phase of swallowing  -HS    Penetration After the Swallow  regular textures;secondary to residue;in valleculae;other (see comments) secretions via interarytenoid space (see image below)  -HS    Aspiration After the Swallow  other (see comments) risk due to swallow mistiming and spillage of oral residue  -HS    Residue  base of tongue;valleculae;pyriform sinuses;regular Textures;secondary to reduced base of tongue retraction;secondary to reduced laryngeal elevation  -HS    Response to Residue  cleared residue;with spontaneous subsequent swallow;with liquid wash  -HS    Attempted Compensatory Maneuvers  bolus size;multiple swallows;alternate liquids/solids  -HS    Pharyngeal Phase, Comment  Patient demonstrates intermittent difficulty with swallow timing. Premature spillage with liquids and mixed consistencies due to reduced tongue base " retraction/strength.   -HS    FEES Summary  Mild to moderate oropharyngeal dysphagia characterized by swallow mistiming, reduced tongue base strength/retraction, and decreased laryngeal elevation. Observed premature spillage to the pyriforms with thin liquids via cup, volume spilled increased with straw sips and with mixed consistencies. Increased mastication time and tongue pumping with regular trials. One instance of oral residue from regular trial falling over the epiglottis and into the laryngeal vestibule (see image below). Patient did sense penetrated material and coughed in response which cleared the material. Also observed thick secretions in the interarytenoid space at the beginning of the exam with difficulty clearing via throat clear or cough (see image below). Patient with no aspiration during exam, however at risk due to both oral residue and management of thick secretions. Recommend ST to initiate swallow and voice therapy. Consider use on Expiratory Muscle Strength Trainer ([id number]) to improve swallow, voice, cough, and breathing.   -HS       Clinical Impression    SLP Swallowing Diagnosis  mild-moderate;oral dysfunction;pharyngeal dysfunction  -HS    Functional Impact  risk of aspiration/pneumonia  -HS    Rehab Potential/Prognosis, Swallowing  good, to achieve stated therapy goals  -HS    Swallow Criteria for Skilled Therapeutic Interventions Met  demonstrates skilled criteria  -HS       Recommendations    Therapy Frequency (Swallow)  1 day per week  -HS    Predicted Duration Therapy Intervention (Days)  until discharge  -HS    SLP Diet Recommendation  mechanical soft with no mixed consistencies;thin liquids  -HS    Recommended Diagnostics  reassess via FEES as indicated  -    Recommended Precautions and Strategies  upright posture during/after eating;small bites of food and sips of liquid;no straw;multiple swallows per bite of food;alternate between small bites of food and sips of liquid  -HS     SLP Rec. for Method of Medication Administration  meds whole;with pudding or applesauce;with thin liquids  -HS    Monitor for Signs of Aspiration  yes;notify SLP if any concerns  -HS    Anticipated Dischage Disposition  assisted living facility ([facility])  -HS      User Key  (r) = Recorded By, (t) = Taken By, (c) = Cosigned By    Initials Name Provider Type     Eusebia Mayberry MS CCC-SLP Speech and Language Pathologist           Thick secretions - interarytenoid space         Hyperfunction - sustained closure - false vocal folds obscured view of true vocal folds.           Oral residue from regular trial - fell into laryngeal vestibule - patient sensed, coughed in response, and cleared material.      OP SLP Education     Row Name 01/23/20 1400       Education    Barriers to Learning  No barriers identified  -HS    Education Provided  Described results of evaluation;Patient expressed understanding of evaluation;Patient participated in establishing goals and treatment plan  -HS    Assessed  Learning needs;Learning motivation;Learning preferences;Learning readiness  -HS    Learning Motivation  Strong  -HS    Learning Method  Explanation  -HS    Teaching Response  Verbalized understanding  -      User Key  (r) = Recorded By, (t) = Taken By, (c) = Cosigned By    Initials Name Effective Dates     Eusebia Mayberry MS CCC-SLP 06/08/18 -           SLP OP Goals     Row Name 01/23/20 1400          Goal Type Needed    Goal Type Needed  Dysphagia;Voice  -HS        Subjective Pain    Able to rate subjective pain?  yes  -HS     Pre-Treatment Pain Level  0  -HS     Post-Treatment Pain Level  0  -HS        Dysphagia Goals    Dysphagia LTG's  Patient will safely consume the recommended diet without complications such as aspiration pneumonia  -HS     Status: Patient will safely consume the recommended diet without complications such as aspiration pneumonia  New  -HS     Dysphagia STG's  Patient will increase superior/anterior  movement of hyolaryngeal complex to reduce residue which may fall into airway by using the Expiratory Muscle Strength Trainer;Patient will increase tipping of arytenoids and closure at entrance to the airway to reduce penetration into the vestibule by completing;Patient will increase closure of larynx to keep food from falling into the airway by completing;Patient will increase strength of tongue base and posterior pharyngeal walls to reduce residue that might fall into airway by completing  -HS     Patient will increase closure of larynx to keep food from falling into the airway by completing  Valsalva;without cues  -HS     Status: Patient will increase closure of larynx to keep food from falling into the airway by completing  New  -HS     Patient will increase strength of tongue base and posterior pharyngeal walls to reduce residue that might fall into airway by completing  effotful swallow;Jennifer (tongue hold);tongue base retraction;pretend to yawn;pretend to gargle;without cues  -HS     Status: Patient will increase strength of tongue base and posterior pharyngeal walls to reduce residue that might fall into airway by completing  New  -HS     Status: Patient will increase superior/anterior movement of hyolaryngeal complex to reduce residue which may fall into airway by using the Expiratory Muscle Strength Trainer  New  -HS     Patient will increase tipping of arytenoids and closure at entrance to the airway to reduce penetration into the vestibule by completing  super-supraglottic swallow;without cues  -HS     Status: Patient will increase tipping of arytenoids and closure at entrance to the airway to reduce penetration into the vestibule by completing  New  -HS        Voice Goals    Voice LTG's  Patient will maintain a program of good vocal hygiene in all settings by developing an awareness of behaviors and lifestyle;Patient will be able to use voice in vocational and avocational activities without functional  limitations due to hoarseness.  -HS     Patient will maintain a program of good vocal hygiene in all settings by developing an awareness of behaviors and lifestyle.  without cues  -HS     Status: Patient will maintain a program of good vocal hygiene in all settings by developing an awareness of behaviors and lifestyle.  New  -HS     Patient will be able to use voice in vocational and avocational activities without functional limitations due to hoarseness.  without cues  -HS     Status: Patient will be able to use voice in vocational and avocational activities without functional limitations due to hoarseness.  New  -HS     Voice STG's  Patient will institute vocal hygiene technique of replacing cough/throat clear with silent cough or hard swallow;Patient will reduce hyperfunctional use of the vocal mechanism via improved use of diaphragmatic breathing;Patient will reduce hyperfunctional use of the vocal mechanism via reducing tension in the shoulders, neck, jaw, face, mouth, and pharynx through completion of exercises;Patient will reduce hyperfunctional use of the vocal mechanism via improved tone focus for easy phonation;Pt will demonstrate an understanding of the structures and functions of the phonatory/respiratory tract (respiration, phonation, resonance and articulation);Patient will reduce hyperfunctional use of voice by performing Vocal Function Exercises  -HS     Patient will institute vocal hygiene technique of replacing cough/throat clear with silent cough or hard swallow  without cues  -HS     Status: Patient will institute vocal hygiene technique of replacing cough/throat clear with silent cough or hard swallow  New  -HS     Patient will reduce hyperfunctional use of the vocal mechanism via improved use of diaphragmatic breathing  90%:;without cues  -HS     Status: Patient will reduce hyperfunctional use of the vocal mechanism via improved use of diaphragmatic breathing  New  -HS     Patient will reduce  hyperfunctional use of the vocal mechanism via reducing tension in the shoulders, neck, jaw, face, mouth, and pharynx through completion of exercises  90%  -HS     Status: Patient will reduce hyperfunctional use of the vocal mechanism via reducing tension in the shoulders, neck, jaw, face, mouth, and pharynx through completion of exercises  New  -HS     Patient will reduce hyperfunctional use of the vocal mechanism via improved tone focus for easy phonation  90%  -HS     Status: Patient will reduce hyperfunctional use of the vocal mechanism via improved tone focus for easy phonation  New  -HS     Pt will demonstrate an understanding of the structures and functions of the phonatory/respiratory tract (respiration, phonation, resonance and articulation)  without cues  -HS     Status: Pt will demonstrate an understanding of the structures and functions of the phonatory/respiratory tract (respiration, phonation, resonance and articulation)  New  -HS     Patient will reduce hyperfunctional use of voice by performing Vocal Function Exercises  90%:;without cues  -HS     Status: Patient will reduce hyperfunctional use of voice by performing Vocal Function Exercises  New  -HS        SLP Time Calculation    SLP Goal Re-Cert Due Date  04/23/20  -       User Key  (r) = Recorded By, (t) = Taken By, (c) = Cosigned By    Initials Name Provider Type    HS Eusebia Mayberry MS CCC-SLP Speech and Language Pathologist          OP SLP Assessment/Plan - 01/23/20 1400        SLP Assessment    Functional Problems  Swallowing   -HS    Clinical Impression: Swallowing  Mild-Moderate:;oropharyngeal phase dysphagia   -HS    Prognosis  Good (comment)    Patient highly motivated and receptive to information  -    Patient/caregiver participated in establishment of treatment plan and goals  Yes   -HS    Patient would benefit from skilled therapy intervention  Yes   -HS       SLP Plan    Frequency  1x/week   -HS    Duration  4-6 weeks    -HS     Planned CPT's?  SLP SWALLOW THERAPY: 03157;SLP INDIVIDUAL SPEECH THERAPY: 17415   -HS    Expected Duration Therapy Session - minutes  45-60 minutes   -HS      User Key  (r) = Recorded By, (t) = Taken By, (c) = Cosigned By    Initials Name Provider Type    Eusebia Graves MS CCC-SLP Speech and Language Pathologist              SLP Outcome Measures (last 72 hours)      SLP Outcome Measures     Row Name 01/23/20 1400             SLP Outcome Measures    Outcome Measure Used?  Adult NOMS  -HS         Adult FCM Scores    FCM Chosen  Swallowing  -HS      Swallowing FCM Score  5  -HS        User Key  (r) = Recorded By, (t) = Taken By, (c) = Cosigned By    Initials Name Effective Dates    Eusebia Graves MS CCC-SLP 06/08/18 -            Time Calculation:   SLP Start Time: 1300  SLP Stop Time: 1400  SLP Time Calculation (min): 60 min    Therapy Charges for Today     Code Description Service Date Service Provider Modifiers Qty    [id number] HC ST FIBEROPTIC ENDO EVAL SWALL 4 1/23/2020 Eusebia Mayberry MS CCC-SLP GN 1                 Eusebia Mayberry MS CCC-ORION  1/23/2020

## 2020-01-30 ENCOUNTER — HOSPITAL ENCOUNTER (OUTPATIENT)
Dept: SPEECH THERAPY | Facility: HOSPITAL | Age: 85
Setting detail: THERAPIES SERIES
Discharge: HOME | End: 2020-01-30

## 2020-01-30 DIAGNOSIS — R13.12 OROPHARYNGEAL DYSPHAGIA: Primary | ICD-10-CM

## 2020-01-30 DIAGNOSIS — R05.9 COUGH: ICD-10-CM

## 2020-01-30 DIAGNOSIS — Z86.73 HISTORY OF CVA (CEREBROVASCULAR ACCIDENT): ICD-10-CM

## 2020-01-30 DIAGNOSIS — J32.9 CHRONIC SINUSITIS, UNSPECIFIED LOCATION: ICD-10-CM

## 2020-01-30 DIAGNOSIS — R49.0 DYSPHONIA: ICD-10-CM

## 2020-01-30 DIAGNOSIS — K21.00 GERD WITH ESOPHAGITIS: ICD-10-CM

## 2020-01-30 DIAGNOSIS — J45.50 SEVERE PERSISTENT ASTHMA WITHOUT COMPLICATION: ICD-10-CM

## 2020-01-30 PROCEDURE — 92526 ORAL FUNCTION THERAPY: CPT

## 2020-01-31 NOTE — THERAPY TREATMENT NOTE
Outpatient Speech Language Pathology   Adult Swallow Treatment Note  [facility]     Patient Name: Caitlyn Longoria  : 1934  MRN: [id number]  Today's Date: 2020         Visit Date: 2020   Patient Active Problem List   Diagnosis   • Neck and shoulder pain   • Arthropathy of shoulder region   • Carpal tunnel syndrome of left wrist   • Chronic pain of both shoulders   • Chronic left shoulder pain   • Arthropathy of left shoulder   • Dysarthria   • DM type 2 (diabetes mellitus, type 2) (CMS/[facility])   • PAF (paroxysmal atrial fibrillation) (CMS/[facility])   • HLD (hyperlipidemia)   • Bronchitis   • Coronary artery disease involving native coronary artery of native heart with angina pectoris (CMS/[facility])   • CVA (cerebral vascular accident) (CMS/[facility])   • HTN (hypertension)   • CKD (chronic kidney disease) stage 3, GFR 30-59 ml/min (CMS/[facility])   • Chronic combined systolic and diastolic congestive heart failure (CMS/[facility])   • Infection of prosthetic right knee joint (CMS/[facility])   • Stasis dermatitis of right lower extremity due to peripheral venous hypertension   • Current use of long term anticoagulation   • Morbidly obese (CMS/[facility])   • Acute respiratory failure with hypoxia (CMS/[facility])        Visit Dx:    ICD-10-CM ICD-9-CM   1. Oropharyngeal dysphagia R13.12 787.22   2. Dysphonia R49.0 784.42   3. GERD with esophagitis K21.0 530.11   4. Cough R05 786.2   5. Chronic sinusitis, unspecified location J32.9 473.9   6. Severe persistent asthma without complication J45.50 493.90   7. History of CVA (cerebrovascular accident) Z86.73 V12.54       SLP OP Goals     Row Name 20 1400          Subjective Comments    Subjective Comments  Caitlyn is pleasant and cooperative. She is highly motivated to participate in therapy. She was recpetive to information.   -HS        Subjective Pain    Able to rate subjective pain?  yes  -HS     Pre-Treatment Pain Level  0  -HS     Post-Treatment Pain Level  0  -HS        Dysphagia Goals     Dysphagia STG's  Patient will improve hyolaryngeal elevation via completing Zeus (head lift)  -HS     Patient will increase closure of larynx to keep food from falling into the airway by completing  Valsalva;without cues  -HS     Status: Patient will increase closure of larynx to keep food from falling into the airway by completing  New  -HS     Comments: Patient will increase closure of larynx to keep food from falling into the airway by completing  Did not complete this date. Will add as patient demonstrates efficiency with other swallow exercises provided.  -HS     Patient will increase strength of tongue base and posterior pharyngeal walls to reduce residue that might fall into airway by completing  effotful swallow;Jennifer (tongue hold);tongue base retraction;pretend to yawn;pretend to gargle;without cues  -HS     Status: Patient will increase strength of tongue base and posterior pharyngeal walls to reduce residue that might fall into airway by completing  Progressing as expected  -HS     Comments: Patient will increase strength of tongue base and posterior pharyngeal walls to reduce residue that might fall into airway by completing  Patient able to demonstrate following verbal instruction and SLP demonstration. Written directions provided for patient to complete independently at home.   -HS     Patient will improve hyolaryngeal elevation via completing Zeus (head lift)  sustained lift (comment #/duration of lifts);repetitive lift (comment #/duration of lifts) 30/60 seconds  -HS     Status: Patient will improve hyolaryngeal elevation via completing Zeus (head lift)  New  -HS     Comments: Patient will improve hyolaryngeal elevation via completing Zeus (head lift)  Added goal. Provided verbal directions and SLP modeling. Patient to attempt at home and report back with results. Recommended patient discontinue if she experiences any pain with completion.   -HS     Status: Patient will increase  superior/anterior movement of hyolaryngeal complex to reduce residue which may fall into airway by using the Expiratory Muscle Strength Trainer  New  -HS     Comments: Patient will increase superior/anterior movement of hyolaryngeal complex to reduce residue which may fall into airway by using the Expiratory Muscle Strength Trainer  Did not address this session. Will introduce and recommend EMST as appropriate.   -HS     Patient will increase tipping of arytenoids and closure at entrance to the airway to reduce penetration into the vestibule by completing  super-supraglottic swallow;without cues  -HS     Status: Patient will increase tipping of arytenoids and closure at entrance to the airway to reduce penetration into the vestibule by completing  Progressing as expected  -HS     Comments: Patient will increase tipping of arytenoids and closure at entrance to the airway to reduce penetration into the vestibule by completing  Required min cues and SLP demonstration to complete with SSG swallow adequately. Provided written directions for independent completion at home.  -HS        Voice Goals    Patient will institute vocal hygiene technique of replacing cough/throat clear with silent cough or hard swallow  without cues  -HS     Status: Patient will institute vocal hygiene technique of replacing cough/throat clear with silent cough or hard swallow  -- Ongoing  -HS     Comments: Patient will institute vocal hygiene technique of replacing cough/throat clear with silent cough or hard swallow  Did not complete this session due to time constraints.  -HS     Patient will reduce hyperfunctional use of the vocal mechanism via improved use of diaphragmatic breathing  90%:;without cues  -HS     Status: Patient will reduce hyperfunctional use of the vocal mechanism via improved use of diaphragmatic breathing  Progressing as expected  -HS     Comments: Patient will reduce hyperfunctional use of the vocal mechanism via improved  use of diaphragmatic breathing  Completed education on diaphragmatic breathing with feedback technique and home practice. Patient able to demonstrate with 90% accuracy at rest given intermittent verbal cues. Handout provided.   -HS     Patient will reduce hyperfunctional use of the vocal mechanism via reducing tension in the shoulders, neck, jaw, face, mouth, and pharynx through completion of exercises  90%  -HS     Status: Patient will reduce hyperfunctional use of the vocal mechanism via reducing tension in the shoulders, neck, jaw, face, mouth, and pharynx through completion of exercises  -- Ongoing  -HS     Comments: Patient will reduce hyperfunctional use of the vocal mechanism via reducing tension in the shoulders, neck, jaw, face, mouth, and pharynx through completion of exercises  Did not complete this session due to time constraints.  -HS     Patient will reduce hyperfunctional use of the vocal mechanism via improved tone focus for easy phonation  90%  -HS     Status: Patient will reduce hyperfunctional use of the vocal mechanism via improved tone focus for easy phonation  -- Ongoing  -HS     Comments: Patient will reduce hyperfunctional use of the vocal mechanism via improved tone focus for easy phonation  Did not complete this session due to time constraints.  -HS     Pt will demonstrate an understanding of the structures and functions of the phonatory/respiratory tract (respiration, phonation, resonance and articulation)  without cues  -HS     Status: Pt will demonstrate an understanding of the structures and functions of the phonatory/respiratory tract (respiration, phonation, resonance and articulation)  Progressing as expected Partially Met  -HS     Comments: Pt will demonstrate an understanding of the structures and functions of the phonatory/respiratory tract (respiration, phonation, resonance and articulation)  Completed education. Patient verbalized understanding. Plan for ongoing education as  needed.   -HS     Patient will reduce hyperfunctional use of voice by performing Vocal Function Exercises  90%:;without cues  -HS     Status: Patient will reduce hyperfunctional use of voice by performing Vocal Function Exercises  -- Ongoing  -HS     Comments: Patient will reduce hyperfunctional use of voice by performing Vocal Function Exercises  Did not complete this session due to time constraints.  -HS       User Key  (r) = Recorded By, (t) = Taken By, (c) = Cosigned By    Initials Name Provider Type    Eusebia Graves MS CCC-SLP Speech and Language Pathologist          OP SLP Education     Row Name 01/30/20 1400       Education    Barriers to Learning  No barriers identified  -HS    Education Provided  -- Extensive education completed on structure/function of breathing, phonation, swallowing; swallow exercises.   -HS    Assessed  Learning motivation  -HS    Learning Motivation  Strong  -HS    Learning Method  Explanation;Demonstration;Written materials  -HS    Teaching Response  Verbalized understanding;Demonstrated understanding  -HS      User Key  (r) = Recorded By, (t) = Taken By, (c) = Cosigned By    Initials Name Effective Dates    HS Eusebia Mayberry MS CCC-SLP 06/08/18 -           OP SLP Assessment/Plan - 01/30/20 1400        SLP Plan    Plan Comments  Continue with current plan of care. Patient to intiate home program for swallow strengthening/timing.    -HS      User Key  (r) = Recorded By, (t) = Taken By, (c) = Cosigned By    Initials Name Provider Type    Eusebia Graves MS CCC-SLP Speech and Language Pathologist          Time Calculation:   SLP Start Time: 1300  SLP Stop Time: 1400  SLP Time Calculation (min): 60 min    Therapy Charges for Today     Code Description Service Date Service Provider Modifiers Qty    [id number]  ST TREATMENT SWALLOW 4 1/30/2020 Eusebia Mayberry MS CCC-SLP GN 1            Eusebia Mayberry MS CCC-SLP  1/30/2020

## 2020-02-04 ENCOUNTER — LAB (OUTPATIENT)
Dept: LAB | Facility: HOSPITAL | Age: 85
End: 2020-02-04

## 2020-02-04 ENCOUNTER — ANTICOAGULATION VISIT (OUTPATIENT)
Dept: PHARMACY | Facility: HOSPITAL | Age: 85
End: 2020-02-04

## 2020-02-04 ENCOUNTER — TRANSCRIBE ORDERS (OUTPATIENT)
Dept: ADMINISTRATIVE | Facility: HOSPITAL | Age: 85
End: 2020-02-04

## 2020-02-04 DIAGNOSIS — Z79.01 LONG TERM (CURRENT) USE OF ANTICOAGULANTS: ICD-10-CM

## 2020-02-04 DIAGNOSIS — I48.0 PAF (PAROXYSMAL ATRIAL FIBRILLATION) (HCC): ICD-10-CM

## 2020-02-04 DIAGNOSIS — Z01.810 PRE-OPERATIVE CARDIOVASCULAR EXAMINATION: Primary | ICD-10-CM

## 2020-02-04 DIAGNOSIS — Z01.810 PRE-OPERATIVE CARDIOVASCULAR EXAMINATION: ICD-10-CM

## 2020-02-04 LAB
ANION GAP SERPL CALCULATED.3IONS-SCNC: 10.9 MMOL/L (ref 5–15)
BUN BLD-MCNC: 21 MG/DL (ref 8–23)
BUN/CREAT SERPL: 22.6 (ref 7–25)
CALCIUM SPEC-SCNC: 9.3 MG/DL (ref 8.6–10.5)
CHLORIDE SERPL-SCNC: 103 MMOL/L (ref 98–107)
CO2 SERPL-SCNC: 26.1 MMOL/L (ref 22–29)
CREAT BLD-MCNC: 0.93 MG/DL (ref 0.57–1)
DEPRECATED RDW RBC AUTO: 45.6 FL (ref 37–54)
EOSINOPHIL # BLD MANUAL: 0.39 10*3/MM3 (ref 0–0.4)
EOSINOPHIL NFR BLD MANUAL: 4 % (ref 0.3–6.2)
ERYTHROCYTE [DISTWIDTH] IN BLOOD BY AUTOMATED COUNT: 12.5 % (ref 12.3–15.4)
GFR SERPL CREATININE-BSD FRML MDRD: 57 ML/MIN/1.73
GLUCOSE BLD-MCNC: 101 MG/DL (ref 65–99)
HCT VFR BLD AUTO: 41 % (ref 34–46.6)
HGB BLD-MCNC: 12.9 G/DL (ref 12–15.9)
INR PPP: 2.1 (ref 0.9–1.1)
LYMPHOCYTES # BLD MANUAL: 6.04 10*3/MM3 (ref 0.7–3.1)
LYMPHOCYTES NFR BLD MANUAL: 2 % (ref 5–12)
LYMPHOCYTES NFR BLD MANUAL: 61.6 % (ref 19.6–45.3)
MCH RBC QN AUTO: 31.2 PG (ref 26.6–33)
MCHC RBC AUTO-ENTMCNC: 31.5 G/DL (ref 31.5–35.7)
MCV RBC AUTO: 99 FL (ref 79–97)
MONOCYTES # BLD AUTO: 0.2 10*3/MM3 (ref 0.1–0.9)
NEUTROPHILS # BLD AUTO: 3.07 10*3/MM3 (ref 1.7–7)
NEUTROPHILS NFR BLD MANUAL: 31.3 % (ref 42.7–76)
PLAT MORPH BLD: NORMAL
PLATELET # BLD AUTO: 148 10*3/MM3 (ref 140–450)
PMV BLD AUTO: 9.8 FL (ref 6–12)
POTASSIUM BLD-SCNC: 4.4 MMOL/L (ref 3.5–5.2)
PROTHROMBIN TIME: 23.3 SECONDS (ref 11.7–14.2)
RBC # BLD AUTO: 4.14 10*6/MM3 (ref 3.77–5.28)
RBC MORPH BLD: NORMAL
SODIUM BLD-SCNC: 140 MMOL/L (ref 136–145)
VARIANT LYMPHS NFR BLD MANUAL: 1 % (ref 0–5)
WBC MORPH BLD: NORMAL
WBC NRBC COR # BLD: 9.81 10*3/MM3 (ref 3.4–10.8)

## 2020-02-04 PROCEDURE — 85610 PROTHROMBIN TIME: CPT

## 2020-02-04 PROCEDURE — 85025 COMPLETE CBC W/AUTO DIFF WBC: CPT

## 2020-02-04 PROCEDURE — 36415 COLL VENOUS BLD VENIPUNCTURE: CPT

## 2020-02-04 PROCEDURE — 80048 BASIC METABOLIC PNL TOTAL CA: CPT

## 2020-02-04 PROCEDURE — 85007 BL SMEAR W/DIFF WBC COUNT: CPT

## 2020-02-04 NOTE — PROGRESS NOTES
Anticoagulation Clinic Progress Note    Anticoagulation Summary  As of 2020    INR goal:   2.0-3.0   TTR:   78.9 % (1.3 y)   INR used for dosin.10 (2020)   Warfarin maintenance plan:   2 mg every e, Sat; 4 mg all other days   Weekly warfarin total:   24 mg   Plan last modified:   Alexander Valiente RPH (2020)   Next INR check:   2020   Priority:   Maintenance   Target end date:   Indefinite    Indications    PAF (paroxysmal atrial fibrillation) (CMS/[facility]) [I48.0]             Anticoagulation Episode Summary     INR check location:       Preferred lab:       Send INR reminders to:    [facility]    Comments:         Anticoagulation Care Providers     Provider Role Specialty Phone number    Rommel Veliz MD Referring Cardiology [phone]            Clinic Interview:  Patient Findings     Positives:   Upcoming invasive procedure, Extra doses, Change in   medications, Other complaints    Negatives:   Signs/symptoms of thrombosis, Signs/symptoms of bleeding,   Laboratory test error suspected, Change in health, Change in alcohol use,   Change in activity, Emergency department visit, Upcoming dental procedure,   Missed doses, Change in diet/appetite, Hospital admission, Bruising    Comments:   Finished 5-wk azithromycin course 20. Took 2 mg   yesterday rather than holding. HOLDing x 3 days for procedure .      Clinical Outcomes     Negatives:   Major bleeding event, Thromboembolic event,   Anticoagulation-related hospital admission, Anticoagulation-related ED   visit, Anticoagulation-related fatality    Comments:   Finished 5-wk azithromycin course 20. Took 2 mg   yesterday rather than holding. HOLDing x 3 days for procedure .        INR History:  Anticoagulation Monitoring 2020   INR 2.0 2.1 2.10   INR Date 2020   INR Goal 2.0-3.0 2.0-3.0 2.0-3.0   Trend Same Same Up   Last Week Total 16 mg 20 mg 18 mg   Next Week  Total 20 mg 20 mg 16 mg   Sun 2 mg 4 mg (2/9, 2/16); Otherwise 2 mg 4 mg   Mon 4 mg Hold (2/3); Otherwise 4 mg 4 mg   Tue 2 mg Hold (2/4); Otherwise 2 mg Hold (2/4); Otherwise 2 mg   Wed 4 mg Hold (2/5); Otherwise 4 mg Hold (2/5); Otherwise 4 mg   Thu 2 mg Hold (2/6), 4 mg (2/13); Otherwise 2 mg Hold (2/6); Otherwise 4 mg   Fri 4 mg 4 mg 4 mg   Sat 2 mg 4 mg (2/8); Otherwise 2 mg 4 mg (2/8); Otherwise 2 mg   Visit Report - - -   Some recent data might be hidden       Plan:  1. INR is Therapeutic today- see above in Anticoagulation Summary.   Will instruct Caitlyn Longoria to Change their warfarin regimen- see above in Anticoagulation Summary.  2. Follow up in 2 weeks (2/18/20) in clinic as previously scheduled.   3. They have been instructed to call if any changes in medications, doses, concerns, etc. Patient expresses understanding and has no further questions at this time.    Alexander Valiente [facility]

## 2020-02-06 ENCOUNTER — APPOINTMENT (OUTPATIENT)
Dept: SPEECH THERAPY | Facility: HOSPITAL | Age: 85
End: 2020-02-06

## 2020-02-06 ENCOUNTER — TELEPHONE (OUTPATIENT)
Dept: CARDIOLOGY | Facility: CLINIC | Age: 85
End: 2020-02-06

## 2020-02-06 NOTE — TELEPHONE ENCOUNTER
"02/06/20  11:08 AM  Caitlyn Longoria  1934  Home Phone [phone]     Caitlyn Perez called this morning. She said she started coughing this morning and went into a coughing fit. She coughed up \"a little blood\". She said she has asthma and thinks this was the cause. She has no other symptoms. She wanted to know if she should still come for her PM procedure tomorrow. I told her she should still come, but to call Dr. Suarez if the coughing and bleeding continue. Do you have any other recommendations for her?    Thank you!    Daniela Rubalcava RN  Triage [facility]        "

## 2020-02-07 ENCOUNTER — HOSPITAL ENCOUNTER (OUTPATIENT)
Facility: HOSPITAL | Age: 85
Setting detail: HOSPITAL OUTPATIENT SURGERY
Discharge: HOME | End: 2020-02-07
Attending: INTERNAL MEDICINE | Admitting: INTERNAL MEDICINE

## 2020-02-07 VITALS
SYSTOLIC BLOOD PRESSURE: 135 MMHG | RESPIRATION RATE: 18 BRPM | HEIGHT: 65 IN | BODY MASS INDEX: 36.99 KG/M2 | WEIGHT: 222 LBS | OXYGEN SATURATION: 98 % | DIASTOLIC BLOOD PRESSURE: 94 MMHG | TEMPERATURE: 98.2 F | HEART RATE: 80 BPM

## 2020-02-07 DIAGNOSIS — Z95.0 PRESENCE OF CARDIAC PACEMAKER: Primary | ICD-10-CM

## 2020-02-07 DIAGNOSIS — I48.0 PAF (PAROXYSMAL ATRIAL FIBRILLATION) (HCC): ICD-10-CM

## 2020-02-07 LAB
GLUCOSE BLDC GLUCOMTR-MCNC: 129 MG/DL (ref 70–130)
GLUCOSE BLDC GLUCOMTR-MCNC: 129 MG/DL (ref 70–130)
INR PPP: 1.4 (ref 0.8–1.2)
INR PPP: 1.4 (ref 0.9–1.1)
PROTHROMBIN TIME: 16.7 SECONDS
PROTHROMBIN TIME: 16.7 SECONDS (ref 12.8–15.2)

## 2020-02-07 PROCEDURE — 33228 REMV&REPLC PM GEN DUAL LEAD: CPT | Performed by: INTERNAL MEDICINE

## 2020-02-07 PROCEDURE — 25010000002 MIDAZOLAM PER 1 MG: Performed by: INTERNAL MEDICINE

## 2020-02-07 PROCEDURE — 25010000003 CEFAZOLIN IN DEXTROSE 2-4 GM/100ML-% SOLUTION: Performed by: INTERNAL MEDICINE

## 2020-02-07 PROCEDURE — 25010000002 FENTANYL CITRATE (PF) 100 MCG/2ML SOLUTION: Performed by: INTERNAL MEDICINE

## 2020-02-07 PROCEDURE — C1785 PMKR, DUAL, RATE-RESP: HCPCS | Performed by: INTERNAL MEDICINE

## 2020-02-07 PROCEDURE — 85610 PROTHROMBIN TIME: CPT

## 2020-02-07 PROCEDURE — 25010000003 LIDOCAINE 1 % SOLUTION: Performed by: INTERNAL MEDICINE

## 2020-02-07 PROCEDURE — 82962 GLUCOSE BLOOD TEST: CPT

## 2020-02-07 DEVICE — GEN PM ADVISA SURESCAN DR MRI: Type: IMPLANTABLE DEVICE | Status: FUNCTIONAL

## 2020-02-07 RX ORDER — SODIUM CHLORIDE 0.9 % (FLUSH) 0.9 %
10 SYRINGE (ML) INJECTION AS NEEDED
Status: DISCONTINUED | OUTPATIENT
Start: 2020-02-07 | End: 2020-02-07 | Stop reason: HOSPADM

## 2020-02-07 RX ORDER — LIDOCAINE HYDROCHLORIDE 10 MG/ML
INJECTION, SOLUTION INFILTRATION; PERINEURAL AS NEEDED
Status: DISCONTINUED | OUTPATIENT
Start: 2020-02-07 | End: 2020-02-07 | Stop reason: HOSPADM

## 2020-02-07 RX ORDER — MONTELUKAST SODIUM 10 MG/1
10 TABLET ORAL NIGHTLY
COMMUNITY

## 2020-02-07 RX ORDER — ALBUTEROL SULFATE 2.5 MG/3ML
2.5 SOLUTION RESPIRATORY (INHALATION) EVERY 4 HOURS
COMMUNITY

## 2020-02-07 RX ORDER — AMOXICILLIN 500 MG/1
1000 CAPSULE ORAL 2 TIMES DAILY
COMMUNITY
End: 2020-02-13 | Stop reason: HOSPADM

## 2020-02-07 RX ORDER — MIDAZOLAM HYDROCHLORIDE 1 MG/ML
INJECTION INTRAMUSCULAR; INTRAVENOUS AS NEEDED
Status: DISCONTINUED | OUTPATIENT
Start: 2020-02-07 | End: 2020-02-07 | Stop reason: HOSPADM

## 2020-02-07 RX ORDER — SODIUM CHLORIDE 9 MG/ML
75 INJECTION, SOLUTION INTRAVENOUS CONTINUOUS
Status: DISCONTINUED | OUTPATIENT
Start: 2020-02-07 | End: 2020-02-07 | Stop reason: HOSPADM

## 2020-02-07 RX ORDER — SODIUM CHLORIDE 0.9 % (FLUSH) 0.9 %
3 SYRINGE (ML) INJECTION EVERY 12 HOURS SCHEDULED
Status: DISCONTINUED | OUTPATIENT
Start: 2020-02-07 | End: 2020-02-07 | Stop reason: HOSPADM

## 2020-02-07 RX ORDER — CEFAZOLIN SODIUM 2 G/100ML
INJECTION, SOLUTION INTRAVENOUS CONTINUOUS PRN
Status: COMPLETED | OUTPATIENT
Start: 2020-02-07 | End: 2020-02-07

## 2020-02-07 RX ORDER — CETIRIZINE HYDROCHLORIDE 10 MG/1
10 TABLET ORAL 2 TIMES DAILY
COMMUNITY
End: 2020-10-14 | Stop reason: HOSPADM

## 2020-02-07 RX ORDER — LIDOCAINE HYDROCHLORIDE 10 MG/ML
0.1 INJECTION, SOLUTION EPIDURAL; INFILTRATION; INTRACAUDAL; PERINEURAL ONCE AS NEEDED
Status: DISCONTINUED | OUTPATIENT
Start: 2020-02-07 | End: 2020-02-07 | Stop reason: HOSPADM

## 2020-02-07 RX ORDER — KETOCONAZOLE 20 MG/G
CREAM TOPICAL DAILY
COMMUNITY
End: 2020-10-14 | Stop reason: HOSPADM

## 2020-02-07 RX ORDER — FENTANYL CITRATE 50 UG/ML
INJECTION, SOLUTION INTRAMUSCULAR; INTRAVENOUS AS NEEDED
Status: DISCONTINUED | OUTPATIENT
Start: 2020-02-07 | End: 2020-02-07 | Stop reason: HOSPADM

## 2020-02-07 RX ORDER — OXYCODONE HYDROCHLORIDE AND ACETAMINOPHEN 5; 325 MG/1; MG/1
1-2 TABLET ORAL EVERY 4 HOURS PRN
Qty: 10 TABLET | Refills: 0 | Status: SHIPPED | OUTPATIENT
Start: 2020-02-07 | End: 2020-04-26 | Stop reason: HOSPADM

## 2020-02-07 RX ADMIN — SODIUM CHLORIDE 75 ML/HR: 9 INJECTION, SOLUTION INTRAVENOUS at 08:47

## 2020-02-07 NOTE — DISCHARGE INSTRUCTIONS
"[facility]   [phone]    Post Pacemaker / Defibrillator Implant Instructions      1.  The dressing may be removed the next day.    2. If steri-strips were used, they should not be removed. Allow them to \"fall off\".      3. You may shower after the dressing is removed. Do not allow shower water to hit directly on incision.    4. No lotion/powder/ointment/cream on incision until it is healed.    5. Gently wash incision daily with soap and water and pat dry.    6. You may reapply a dressing if there is drainage, otherwise leave your incision open to air. If you reapply a dressing, please notify the pacemaker clinic.    7. No heavy lifting, pulling, or pushing.    8. Do not raise the affected arm over your head for a minimum of 1 month.    9. The pacemaker clinic will contact you (usually within 1 business day) to schedule a pacemaker/incision check. The check is usually done 7-10 days post-implant. If you have not heard from the pacemaker clinic within 3 days, please call the office.    10. Please call the office if you experience any of the following:   bleeding or drainage from your incision   swelling, redness, or opening of your incision   fever or chills   pain not relieved with medication   chest pain or difficulty breathing   lightheadness    11. For defibrillator patients only: If you receive a shock from your device, please call the office. If you receive 2 or more shocks within a 24 hour period OR if you receive 1 shock and feel poorly, you should be evaluated in the emergency room. Please DO NOT DRIVE if you have received a shock until your device has been checked.  "

## 2020-02-07 NOTE — H&P
Patient Name: Caitlyn Longoria  Age/Sex: 85 y.o. female  : 1934  MRN: [id number]    Date of Admission: 2020  Date of Encounter Visit: 20  Encounter Provider: Kiel Field MD  Place of Service: [facility] CARDIOLOGY      Referring Provider: Kiel Field MD  Patient Care Team:  Zenaida Suarez MD as PCP - General (Internal Medicine)  Pao Levi [facility] as Pharmacist  Alexander Valiente [facility] as Pharmacist (Pharmacy)    Subjective:       History of Present Illness:  Caitlyn Longoria is a 85 y.o. female with sick sinus syndrome.  Her pacemaker is now at [facility], [place].  She is here for replacement.      Past Medical History:  Past Medical History:   Diagnosis Date   • Aortic calcification (CMS/HCC)     mild, on echo 2017   • Aortic regurgitation     Trace   • Asthma    • CAD (coronary artery disease)    • Chronic combined systolic and diastolic congestive heart failure (CMS/HCC)    • CKD (chronic kidney disease) stage 3, GFR 30-59 ml/min (CMS/HCC)    • Coronary artery disease involving native coronary artery of native heart with angina pectoris (CMS/HCC)    • DM type 2 (diabetes mellitus, type 2) (CMS/HCC)    • Hypertension    • Mild mitral regurgitation    • Mitral annular calcification     2017- echo, moderate   • PAF (paroxysmal atrial fibrillation) (CMS/HCC)    • SSS (sick sinus syndrome) (CMS/HCC)    • Tricuspid regurgitation     Trace       Past Surgical History:   Procedure Laterality Date   • CARDIAC CATHETERIZATION     • CHOLECYSTECTOMY     • CORONARY STENT PLACEMENT     • HERNIA REPAIR      hital hernia   • HYSTERECTOMY     • PACEMAKER IMPLANTATION     • REPLACEMENT TOTAL KNEE Bilateral        Home Medications:   Medications Prior to Admission   Medication Sig Dispense Refill Last Dose   • acetaminophen (TYLENOL) 650 MG 8 hr tablet Take 650 mg by mouth 2 (Two) Times a Day.   2020 at Unknown time   • Acetylcysteine 500 MG capsule Take  1,000 mg by mouth Daily With Dinner.   2/6/2020 at Unknown time   • albuterol (PROVENTIL) (2.5 MG/3ML) 0.083% nebulizer solution Take 2.5 mg by nebulization Every 4 (Four) Hours As Needed for Wheezing.   2/6/2020 at Unknown time   • albuterol sulfate  (90 Base) MCG/ACT inhaler Inhale 2 puffs Every 4 (Four) Hours As Needed for Wheezing. 1 inhaler 3 2/7/2020 at Unknown time   • amoxicillin (AMOXIL) 500 MG capsule Take 1,000 mg by mouth 2 (Two) Times a Day.   2/6/2020 at Unknown time   • aspirin 81 MG tablet Take 81 mg by mouth Daily.   2/5/2020   • Calcium Carb-Cholecalciferol (CALCIUM 500+D) 500-200 MG-UNIT tablet Take 1 tablet by mouth 2 (Two) Times a Day.   2/7/2020 at Unknown time   • carvedilol (COREG) 3.125 MG tablet Take 1 tablet by mouth 2 (Two) Times a Day. 180 tablet 3 2/7/2020 at Unknown time   • cefadroxil (DURICEF) 500 MG capsule Take 500 mg by mouth 2 (Two) Times a Day. Taken consistently for MSSA infection   2/7/2020 at Unknown time   • cetirizine (zyrTEC) 10 MG tablet Take 10 mg by mouth Daily.   2/6/2020 at Unknown time   • Dupilumab (DUPIXENT) 300 MG/2ML solution prefilled syringe Inject  under the skin into the appropriate area as directed.   1/31/2020   • fluticasone (FLONASE) 50 MCG/ACT nasal spray 1 spray into the nostril(s) as directed by provider 2 (Two) Times a Day.   2/7/2020 at Unknown time   • Fluticasone-Umeclidin-Vilant (TRELEGY ELLIPTA IN) Inhale. As directed   2/7/2020 at Unknown time   • furosemide (LASIX) 20 MG tablet Take 20 mg by mouth Every Morning.   2/6/2020 at Unknown time   • glipiZIDE (GLUCOTROL) 5 MG tablet Take 2.5 mg by mouth Every Morning.   2/6/2020 at Unknown time   • ketoconazole (NIZORAL) 2 % cream Apply  topically to the appropriate area as directed Daily.   2/6/2020 at Unknown time   • losartan (COZAAR) 25 MG tablet TAKE ONE TABLET BY MOUTH DAILY 90 tablet 1 2/6/2020 at Unknown time   • Menthol, Topical Analgesic, (BIOFREEZE ROLL-ON EX) Apply  topically.    2/6/2020 at Unknown time   • montelukast (SINGULAIR) 10 MG tablet Take 10 mg by mouth Every Night.   2/6/2020 at Unknown time   • Omega-3 Fatty Acids (FISH OIL) 1000 MG capsule capsule Take 1,000 mg by mouth 2 (Two) Times a Day With Meals.   2/6/2020 at Unknown time   • oxazepam (SERAX) 10 MG capsule Take 1 capsule by mouth Every Night. 5 capsule 0 2/6/2020 at Unknown time   • oxybutynin XL (DITROPAN-XL) 10 MG 24 hr tablet Take 10 mg by mouth every night at bedtime.   2/6/2020 at Unknown time   • polyethyl glycol-propyl glycol (LUBRICATING EYE DROPS) 0.4-0.3 % solution ophthalmic solution Administer 1 drop to both eyes Every 1 (One) Hour As Needed.   2/6/2020 at Unknown time   • rosuvastatin (CRESTOR) 20 MG tablet Take 20 mg by mouth Every Night.   2/6/2020 at Unknown time   • Umeclidinium Bromide (INCRUSE ELLIPTA) 62.5 MCG/INH aerosol powder  Inhale 1 each Daily.   2/6/2020 at Unknown time   • warfarin (COUMADIN) 4 MG tablet Take one tablet by mouth daily or as directed. 90 tablet 0 2/4/2020   • loperamide (IMODIUM) 2 MG capsule Take 2 mg by mouth Daily. After first bowel movement   Taking       Allergies:  Allergies   Allergen Reactions   • Accupril [Quinapril Hcl] Delirium     Swelling, HA,, confusion and constipation per pt   • Ahist [Chlorpheniramine] Nausea Only, Other (See Comments) and Dizziness     Headache, Blurred vision   • Chlorcyclizine Unknown (See Comments)   • Clarithromycin Nausea Only     Other reaction(s): Headache, Depression, Flushed   • Diclofenac Sodium Unknown (See Comments)   • Diphenhydramine      Benadryl   • Esomeprazole GI Intolerance     Nexium. Stomach issues   • Ibuprofen Other (See Comments)     Does not recall    • Levalbuterol Swelling     Xopenex   • Levocetirizine Other (See Comments)     Xyzal. Diarrhea, Stomach cramps   • Lipitor [Atorvastatin] Other (See Comments)     Muscle pain and weakness, dark urine   • Lodine [Etodolac]    • Omeprazole Nausea Only     Prilosec. Headache    • Pravachol [Pravastatin] Nausea Only and Other (See Comments)     Bloated, Constipation, Headaches   • Quinapril Swelling and Confusion     Accupril. Headaches, constipation   • Sulfa Antibiotics    • Sulindac Myalgia     Other reaction(s): Headache, joint pain, bruising   • Valdecoxib Irritability   • Prednisone Rash       Past Social History:  Social History     Socioeconomic History   • Marital status: Single     Spouse name: Not on file   • Number of children: Not on file   • Years of education: Not on file   • Highest education level: Not on file   Tobacco Use   • Smoking status: Never Smoker   • Smokeless tobacco: Never Used   • Tobacco comment: caffeine use   Substance and Sexual Activity   • Alcohol use: Not Currently   • Drug use: No   • Sexual activity: Defer   Lifestyle   • Physical activity:     Days per week: 2 days     Minutes per session: 60 min   • Stress: Not on file        Past Family History:  Family History   Problem Relation Age of Onset   • Heart disease Mother    • Hypertension Mother    • Heart disease Father    • Hypertension Father    • Hypertension Brother    • Heart disease Brother    • Diabetes Niece        Review of Systems: All systems reviewed. Pertinent positives identified in HPI. All other systems are negative.     REVIEW OF SYSTEMS:   CONSTITUTIONAL: No weight loss, fever, chills, weakness or fatigue.   HEENT: Eyes: No visual loss, blurred vision, double vision or yellow sclerae. Ears, Nose, Throat: No hearing loss, sneezing, congestion, runny nose or sore throat.   SKIN: No rash or itching.     RESPIRATORY: No shortness of breath, hemoptysis, cough or sputum.   GASTROINTESTINAL: No anorexia, nausea, vomiting or diarrhea. No abdominal pain, bright red blood per rectum or melena.  GENITOURINARY: No burning on urination, hematuria or increased frequency.  NEUROLOGICAL: No headache, dizziness, syncope, paralysis, ataxia, numbness or tingling in the extremities. No change in bowel  or bladder control.   MUSCULOSKELETAL: No muscle, back pain, joint pain or stiffness.   HEMATOLOGIC: No anemia, bleeding or bruising.   LYMPHATICS: No enlarged nodes. No history of splenectomy.   PSYCHIATRIC: No history of depression, anxiety, hallucinations.   ENDOCRINOLOGIC: No reports of sweating, cold or heat intolerance. No polyuria or polydipsia.       Objective:     Objective:  Temp:  [98.2 °F (36.8 °C)] 98.2 °F (36.8 °C)  Heart Rate:  [65] 65  Resp:  [20] 20  BP: (133)/(75) 133/75  No intake or output data in the 24 hours ending 02/07/20 0916  Body mass index is 36.94 kg/m².      02/07/20  0845   Weight: 101 kg (222 lb)           Physical Exam:   Physical Exam   Constitutional: She is oriented to person, place, and time. She appears well-developed and well-nourished.   HENT:   Head: Normocephalic.   Eyes: Pupils are equal, round, and reactive to light.   Neck: Normal range of motion. No JVD present. Carotid bruit is not present. No thyromegaly present.   Cardiovascular: Normal rate, regular rhythm, S1 normal, S2 normal, normal heart sounds and intact distal pulses. Exam reveals no gallop and no friction rub.   No murmur heard.  Pulmonary/Chest: Effort normal.   Musculoskeletal: She exhibits no edema.   Neurological: She is alert and oriented to person, place, and time.   Skin: Skin is warm, dry and intact. No erythema.   Psychiatric: She has a normal mood and affect.   Vitals reviewed.        Lab Review:     Results from last 7 days   Lab Units 02/04/20  0908   SODIUM mmol/L 140   POTASSIUM mmol/L 4.4   CHLORIDE mmol/L 103   CO2 mmol/L 26.1   BUN mg/dL 21   CREATININE mg/dL 0.93   GLUCOSE mg/dL 101*   CALCIUM mg/dL 9.3           Results from last 7 days   Lab Units 02/04/20  0908   WBC 10*3/mm3 9.81   HEMOGLOBIN g/dL 12.9   HEMATOCRIT % 41.0   PLATELETS 10*3/mm3 148     Results from last 7 days   Lab Units 02/07/20  0850 02/07/20  0849 02/04/20  0908   INR  1.4* 1.4* 2.10*                                Imaging:      Imaging Results (Most Recent)     None          EKG:         Baseline:     I personally viewed and interpreted the patient's EKG/Telemetry data.    Assessment:   Assessment/Plan         PAF (paroxysmal atrial fibrillation) (CMS/[facility])            Plan:     Pacemaker replacement    Thank you for allowing me to participate in the care of Caitlyn GARRETT Longoria. Feel free to contact me directly with any further questions or concerns.    Kiel Field MD  [facility]  02/07/20  9:16 AM

## 2020-02-11 ENCOUNTER — APPOINTMENT (OUTPATIENT)
Dept: GENERAL RADIOLOGY | Facility: HOSPITAL | Age: 85
End: 2020-02-11

## 2020-02-11 ENCOUNTER — HOSPITAL ENCOUNTER (INPATIENT)
Facility: HOSPITAL | Age: 85
LOS: 1 days | Discharge: HOME HEALTH | End: 2020-02-13
Attending: EMERGENCY MEDICINE | Admitting: INTERNAL MEDICINE

## 2020-02-11 DIAGNOSIS — R06.2 WHEEZING ON AUSCULTATION: ICD-10-CM

## 2020-02-11 DIAGNOSIS — M25.512 CHRONIC PAIN OF BOTH SHOULDERS: ICD-10-CM

## 2020-02-11 DIAGNOSIS — J96.01 ACUTE RESPIRATORY FAILURE WITH HYPOXEMIA (HCC): ICD-10-CM

## 2020-02-11 DIAGNOSIS — B34.8 RHINOVIRUS: Primary | ICD-10-CM

## 2020-02-11 DIAGNOSIS — R13.12 OROPHARYNGEAL DYSPHAGIA: ICD-10-CM

## 2020-02-11 DIAGNOSIS — M25.511 CHRONIC PAIN OF BOTH SHOULDERS: ICD-10-CM

## 2020-02-11 DIAGNOSIS — G89.29 CHRONIC PAIN OF BOTH SHOULDERS: ICD-10-CM

## 2020-02-11 PROBLEM — J45.901 ASTHMA WITH ACUTE EXACERBATION: Status: ACTIVE | Noted: 2020-02-11

## 2020-02-11 LAB
ALBUMIN SERPL-MCNC: 4.3 G/DL (ref 3.5–5.2)
ALBUMIN/GLOB SERPL: 2 G/DL
ALP SERPL-CCNC: 71 U/L (ref 39–117)
ALT SERPL W P-5'-P-CCNC: 18 U/L (ref 1–33)
ANION GAP SERPL CALCULATED.3IONS-SCNC: 10.4 MMOL/L (ref 5–15)
AST SERPL-CCNC: 25 U/L (ref 1–32)
B PARAPERT DNA SPEC QL NAA+PROBE: NOT DETECTED
B PERT DNA SPEC QL NAA+PROBE: NOT DETECTED
BILIRUB SERPL-MCNC: 0.7 MG/DL (ref 0.2–1.2)
BUN BLD-MCNC: 21 MG/DL (ref 8–23)
BUN/CREAT SERPL: 22.8 (ref 7–25)
C PNEUM DNA NPH QL NAA+NON-PROBE: NOT DETECTED
CALCIUM SPEC-SCNC: 9.4 MG/DL (ref 8.6–10.5)
CHLORIDE SERPL-SCNC: 104 MMOL/L (ref 98–107)
CO2 SERPL-SCNC: 26.6 MMOL/L (ref 22–29)
CREAT BLD-MCNC: 0.92 MG/DL (ref 0.57–1)
DEPRECATED RDW RBC AUTO: 46.5 FL (ref 37–54)
EOSINOPHIL # BLD MANUAL: 0.1 10*3/MM3 (ref 0–0.4)
EOSINOPHIL NFR BLD MANUAL: 1 % (ref 0.3–6.2)
ERYTHROCYTE [DISTWIDTH] IN BLOOD BY AUTOMATED COUNT: 13.1 % (ref 12.3–15.4)
FLUAV H1 2009 PAND RNA NPH QL NAA+PROBE: NOT DETECTED
FLUAV H1 HA GENE NPH QL NAA+PROBE: NOT DETECTED
FLUAV H3 RNA NPH QL NAA+PROBE: NOT DETECTED
FLUAV SUBTYP SPEC NAA+PROBE: NOT DETECTED
FLUBV RNA ISLT QL NAA+PROBE: NOT DETECTED
GFR SERPL CREATININE-BSD FRML MDRD: 58 ML/MIN/1.73
GLOBULIN UR ELPH-MCNC: 2.2 GM/DL
GLUCOSE BLD-MCNC: 129 MG/DL (ref 65–99)
HADV DNA SPEC NAA+PROBE: NOT DETECTED
HCOV 229E RNA SPEC QL NAA+PROBE: NOT DETECTED
HCOV HKU1 RNA SPEC QL NAA+PROBE: NOT DETECTED
HCOV NL63 RNA SPEC QL NAA+PROBE: NOT DETECTED
HCOV OC43 RNA SPEC QL NAA+PROBE: NOT DETECTED
HCT VFR BLD AUTO: 39.8 % (ref 34–46.6)
HGB BLD-MCNC: 12.7 G/DL (ref 12–15.9)
HMPV RNA NPH QL NAA+NON-PROBE: NOT DETECTED
HOLD SPECIMEN: NORMAL
HOLD SPECIMEN: NORMAL
HPIV1 RNA SPEC QL NAA+PROBE: NOT DETECTED
HPIV2 RNA SPEC QL NAA+PROBE: NOT DETECTED
HPIV3 RNA NPH QL NAA+PROBE: NOT DETECTED
HPIV4 P GENE NPH QL NAA+PROBE: NOT DETECTED
INR PPP: 1.57 (ref 0.9–1.1)
LYMPHOCYTES # BLD MANUAL: 6.35 10*3/MM3 (ref 0.7–3.1)
LYMPHOCYTES NFR BLD MANUAL: 2 % (ref 5–12)
LYMPHOCYTES NFR BLD MANUAL: 61 % (ref 19.6–45.3)
M PNEUMO IGG SER IA-ACNC: NOT DETECTED
MCH RBC QN AUTO: 31.3 PG (ref 26.6–33)
MCHC RBC AUTO-ENTMCNC: 31.9 G/DL (ref 31.5–35.7)
MCV RBC AUTO: 98 FL (ref 79–97)
MONOCYTES # BLD AUTO: 0.21 10*3/MM3 (ref 0.1–0.9)
NEUTROPHILS # BLD AUTO: 3.75 10*3/MM3 (ref 1.7–7)
NEUTROPHILS NFR BLD MANUAL: 36 % (ref 42.7–76)
NT-PROBNP SERPL-MCNC: 631.6 PG/ML (ref 5–1800)
PLAT MORPH BLD: NORMAL
PLATELET # BLD AUTO: 153 10*3/MM3 (ref 140–450)
PMV BLD AUTO: 9.7 FL (ref 6–12)
POTASSIUM BLD-SCNC: 3.5 MMOL/L (ref 3.5–5.2)
PROT SERPL-MCNC: 6.5 G/DL (ref 6–8.5)
PROTHROMBIN TIME: 18.4 SECONDS (ref 11.7–14.2)
RBC # BLD AUTO: 4.06 10*6/MM3 (ref 3.77–5.28)
RBC MORPH BLD: NORMAL
RHINOVIRUS RNA SPEC NAA+PROBE: DETECTED
RSV RNA NPH QL NAA+NON-PROBE: NOT DETECTED
SODIUM BLD-SCNC: 141 MMOL/L (ref 136–145)
TROPONIN T SERPL-MCNC: 0.01 NG/ML (ref 0–0.03)
WBC MORPH BLD: NORMAL
WBC NRBC COR # BLD: 10.41 10*3/MM3 (ref 3.4–10.8)
WHOLE BLOOD HOLD SPECIMEN: NORMAL
WHOLE BLOOD HOLD SPECIMEN: NORMAL

## 2020-02-11 PROCEDURE — 99285 EMERGENCY DEPT VISIT HI MDM: CPT

## 2020-02-11 PROCEDURE — 85610 PROTHROMBIN TIME: CPT | Performed by: NURSE PRACTITIONER

## 2020-02-11 PROCEDURE — G0378 HOSPITAL OBSERVATION PER HR: HCPCS

## 2020-02-11 PROCEDURE — 94799 UNLISTED PULMONARY SVC/PX: CPT

## 2020-02-11 PROCEDURE — 94640 AIRWAY INHALATION TREATMENT: CPT

## 2020-02-11 PROCEDURE — 71046 X-RAY EXAM CHEST 2 VIEWS: CPT

## 2020-02-11 PROCEDURE — 80053 COMPREHEN METABOLIC PANEL: CPT

## 2020-02-11 PROCEDURE — 83880 ASSAY OF NATRIURETIC PEPTIDE: CPT

## 2020-02-11 PROCEDURE — 93005 ELECTROCARDIOGRAM TRACING: CPT | Performed by: EMERGENCY MEDICINE

## 2020-02-11 PROCEDURE — 93010 ELECTROCARDIOGRAM REPORT: CPT | Performed by: INTERNAL MEDICINE

## 2020-02-11 PROCEDURE — 93005 ELECTROCARDIOGRAM TRACING: CPT

## 2020-02-11 PROCEDURE — 25010000002 METHYLPREDNISOLONE PER 125 MG: Performed by: NURSE PRACTITIONER

## 2020-02-11 PROCEDURE — 85007 BL SMEAR W/DIFF WBC COUNT: CPT

## 2020-02-11 PROCEDURE — 84484 ASSAY OF TROPONIN QUANT: CPT

## 2020-02-11 PROCEDURE — 85025 COMPLETE CBC W/AUTO DIFF WBC: CPT

## 2020-02-11 PROCEDURE — 0100U HC BIOFIRE FILMARRAY RESP PANEL 2: CPT | Performed by: NURSE PRACTITIONER

## 2020-02-11 RX ORDER — SODIUM CHLORIDE 0.9 % (FLUSH) 0.9 %
10 SYRINGE (ML) INJECTION AS NEEDED
Status: DISCONTINUED | OUTPATIENT
Start: 2020-02-11 | End: 2020-02-13 | Stop reason: HOSPADM

## 2020-02-11 RX ORDER — NICOTINE POLACRILEX 4 MG
15 LOZENGE BUCCAL
Status: DISCONTINUED | OUTPATIENT
Start: 2020-02-11 | End: 2020-02-13 | Stop reason: HOSPADM

## 2020-02-11 RX ORDER — WARFARIN SODIUM 4 MG/1
4 TABLET ORAL
Status: DISCONTINUED | OUTPATIENT
Start: 2020-02-11 | End: 2020-02-11 | Stop reason: DRUGHIGH

## 2020-02-11 RX ORDER — ONDANSETRON 2 MG/ML
4 INJECTION INTRAMUSCULAR; INTRAVENOUS EVERY 6 HOURS PRN
Status: DISCONTINUED | OUTPATIENT
Start: 2020-02-11 | End: 2020-02-13 | Stop reason: HOSPADM

## 2020-02-11 RX ORDER — CARVEDILOL 3.12 MG/1
3.12 TABLET ORAL 2 TIMES DAILY
Status: DISCONTINUED | OUTPATIENT
Start: 2020-02-11 | End: 2020-02-13 | Stop reason: HOSPADM

## 2020-02-11 RX ORDER — ROSUVASTATIN CALCIUM 20 MG/1
20 TABLET, COATED ORAL NIGHTLY
Status: DISCONTINUED | OUTPATIENT
Start: 2020-02-11 | End: 2020-02-13 | Stop reason: HOSPADM

## 2020-02-11 RX ORDER — FLUTICASONE PROPIONATE 50 MCG
1 SPRAY, SUSPENSION (ML) NASAL 2 TIMES DAILY
Status: DISCONTINUED | OUTPATIENT
Start: 2020-02-11 | End: 2020-02-13 | Stop reason: HOSPADM

## 2020-02-11 RX ORDER — IPRATROPIUM BROMIDE AND ALBUTEROL SULFATE 2.5; .5 MG/3ML; MG/3ML
3 SOLUTION RESPIRATORY (INHALATION) EVERY 4 HOURS PRN
Status: DISCONTINUED | OUTPATIENT
Start: 2020-02-11 | End: 2020-02-13 | Stop reason: HOSPADM

## 2020-02-11 RX ORDER — CETIRIZINE HYDROCHLORIDE 10 MG/1
10 TABLET ORAL 2 TIMES DAILY
Status: DISCONTINUED | OUTPATIENT
Start: 2020-02-11 | End: 2020-02-13 | Stop reason: HOSPADM

## 2020-02-11 RX ORDER — OXYBUTYNIN CHLORIDE 10 MG/1
10 TABLET, EXTENDED RELEASE ORAL NIGHTLY
Status: DISCONTINUED | OUTPATIENT
Start: 2020-02-11 | End: 2020-02-13 | Stop reason: HOSPADM

## 2020-02-11 RX ORDER — NITROGLYCERIN 0.4 MG/1
0.4 TABLET SUBLINGUAL
Status: DISCONTINUED | OUTPATIENT
Start: 2020-02-11 | End: 2020-02-13 | Stop reason: HOSPADM

## 2020-02-11 RX ORDER — ALUMINA, MAGNESIA, AND SIMETHICONE 2400; 2400; 240 MG/30ML; MG/30ML; MG/30ML
15 SUSPENSION ORAL EVERY 6 HOURS PRN
Status: DISCONTINUED | OUTPATIENT
Start: 2020-02-11 | End: 2020-02-13 | Stop reason: HOSPADM

## 2020-02-11 RX ORDER — DEXTROSE MONOHYDRATE 25 G/50ML
25 INJECTION, SOLUTION INTRAVENOUS
Status: DISCONTINUED | OUTPATIENT
Start: 2020-02-11 | End: 2020-02-13 | Stop reason: HOSPADM

## 2020-02-11 RX ORDER — WARFARIN SODIUM 4 MG/1
4 TABLET ORAL
Status: DISCONTINUED | OUTPATIENT
Start: 2020-02-12 | End: 2020-02-13

## 2020-02-11 RX ORDER — METHYLPREDNISOLONE SODIUM SUCCINATE 40 MG/ML
40 INJECTION, POWDER, LYOPHILIZED, FOR SOLUTION INTRAMUSCULAR; INTRAVENOUS EVERY 8 HOURS
Status: DISCONTINUED | OUTPATIENT
Start: 2020-02-12 | End: 2020-02-12

## 2020-02-11 RX ORDER — SODIUM CHLORIDE 0.9 % (FLUSH) 0.9 %
10 SYRINGE (ML) INJECTION EVERY 12 HOURS SCHEDULED
Status: DISCONTINUED | OUTPATIENT
Start: 2020-02-11 | End: 2020-02-13 | Stop reason: HOSPADM

## 2020-02-11 RX ORDER — METHYLPREDNISOLONE SODIUM SUCCINATE 125 MG/2ML
125 INJECTION, POWDER, LYOPHILIZED, FOR SOLUTION INTRAMUSCULAR; INTRAVENOUS ONCE
Status: COMPLETED | OUTPATIENT
Start: 2020-02-11 | End: 2020-02-11

## 2020-02-11 RX ORDER — ACETAMINOPHEN 325 MG/1
650 TABLET ORAL EVERY 4 HOURS PRN
Status: DISCONTINUED | OUTPATIENT
Start: 2020-02-11 | End: 2020-02-13 | Stop reason: HOSPADM

## 2020-02-11 RX ORDER — ONDANSETRON 4 MG/1
4 TABLET, FILM COATED ORAL EVERY 6 HOURS PRN
Status: DISCONTINUED | OUTPATIENT
Start: 2020-02-11 | End: 2020-02-13 | Stop reason: HOSPADM

## 2020-02-11 RX ORDER — CEPHALEXIN 500 MG/1
500 CAPSULE ORAL EVERY 12 HOURS SCHEDULED
Status: DISCONTINUED | OUTPATIENT
Start: 2020-02-11 | End: 2020-02-13 | Stop reason: HOSPADM

## 2020-02-11 RX ORDER — WARFARIN SODIUM 3 MG/1
3 TABLET ORAL
Status: DISCONTINUED | OUTPATIENT
Start: 2020-02-12 | End: 2020-02-11

## 2020-02-11 RX ORDER — ASPIRIN 81 MG/1
81 TABLET ORAL DAILY
Status: DISCONTINUED | OUTPATIENT
Start: 2020-02-12 | End: 2020-02-13 | Stop reason: HOSPADM

## 2020-02-11 RX ORDER — BISACODYL 5 MG/1
5 TABLET, DELAYED RELEASE ORAL DAILY PRN
Status: DISCONTINUED | OUTPATIENT
Start: 2020-02-11 | End: 2020-02-13 | Stop reason: HOSPADM

## 2020-02-11 RX ORDER — MONTELUKAST SODIUM 10 MG/1
10 TABLET ORAL NIGHTLY
Status: DISCONTINUED | OUTPATIENT
Start: 2020-02-11 | End: 2020-02-13 | Stop reason: HOSPADM

## 2020-02-11 RX ORDER — FUROSEMIDE 20 MG/1
20 TABLET ORAL EVERY MORNING
Status: DISCONTINUED | OUTPATIENT
Start: 2020-02-12 | End: 2020-02-13 | Stop reason: HOSPADM

## 2020-02-11 RX ORDER — KETOCONAZOLE 20 MG/G
CREAM TOPICAL DAILY
Status: DISCONTINUED | OUTPATIENT
Start: 2020-02-12 | End: 2020-02-13 | Stop reason: HOSPADM

## 2020-02-11 RX ORDER — IPRATROPIUM BROMIDE AND ALBUTEROL SULFATE 2.5; .5 MG/3ML; MG/3ML
3 SOLUTION RESPIRATORY (INHALATION)
Status: DISCONTINUED | OUTPATIENT
Start: 2020-02-11 | End: 2020-02-13 | Stop reason: HOSPADM

## 2020-02-11 RX ORDER — LOSARTAN POTASSIUM 25 MG/1
25 TABLET ORAL DAILY
Status: DISCONTINUED | OUTPATIENT
Start: 2020-02-12 | End: 2020-02-13 | Stop reason: HOSPADM

## 2020-02-11 RX ORDER — IPRATROPIUM BROMIDE AND ALBUTEROL SULFATE 2.5; .5 MG/3ML; MG/3ML
3 SOLUTION RESPIRATORY (INHALATION) ONCE
Status: COMPLETED | OUTPATIENT
Start: 2020-02-11 | End: 2020-02-11

## 2020-02-11 RX ORDER — WARFARIN SODIUM 3 MG/1
3 TABLET ORAL
Status: DISCONTINUED | OUTPATIENT
Start: 2020-02-12 | End: 2020-02-13 | Stop reason: HOSPADM

## 2020-02-11 RX ADMIN — ACETAMINOPHEN 650 MG: 325 TABLET, FILM COATED ORAL at 23:20

## 2020-02-11 RX ADMIN — CETIRIZINE HYDROCHLORIDE 10 MG: 10 TABLET, FILM COATED ORAL at 23:20

## 2020-02-11 RX ADMIN — SODIUM CHLORIDE, PRESERVATIVE FREE 10 ML: 5 INJECTION INTRAVENOUS at 23:21

## 2020-02-11 RX ADMIN — ROSUVASTATIN CALCIUM 20 MG: 20 TABLET, FILM COATED ORAL at 23:20

## 2020-02-11 RX ADMIN — CEPHALEXIN 500 MG: 500 CAPSULE ORAL at 23:20

## 2020-02-11 RX ADMIN — CARVEDILOL 3.12 MG: 3.12 TABLET, FILM COATED ORAL at 23:20

## 2020-02-11 RX ADMIN — OXYBUTYNIN CHLORIDE 10 MG: 10 TABLET, EXTENDED RELEASE ORAL at 23:20

## 2020-02-11 RX ADMIN — MONTELUKAST SODIUM 10 MG: 10 TABLET, FILM COATED ORAL at 23:20

## 2020-02-11 RX ADMIN — IPRATROPIUM BROMIDE AND ALBUTEROL SULFATE 3 ML: 2.5; .5 SOLUTION RESPIRATORY (INHALATION) at 18:45

## 2020-02-11 RX ADMIN — METHYLPREDNISOLONE SODIUM SUCCINATE 125 MG: 125 INJECTION, POWDER, FOR SOLUTION INTRAMUSCULAR; INTRAVENOUS at 17:48

## 2020-02-11 NOTE — ED TRIAGE NOTES
soa - copd exacerbation.  Here yest for same.  Got script for prednisone but didn't fill it.  Is whz.

## 2020-02-11 NOTE — ED PROVIDER NOTES
EMERGENCY DEPARTMENT ENCOUNTER    Room Number:  E667/1  Date seen:  2/11/2020  Time seen: 5:11 PM  PCP: Zenaida Suarez MD  Historian: patient    HPI:  Chief complaint:shortness of breath, wheezing, coughing    Context:Caitlyn Longoria is a 85 y.o. female who presents to the ED with c/o 1 day history of moderate worsening shortness of breath with associated wet sounding cough.  She used her home Albuterol inhaler with no relief.  She denies fever or body aches but was just here on  Friday getting her pacemaker replaced (end of battery life).  She has some increase in her lower extremity edema and states the breathing was so bad she couldn't go to her exercise classes at [facility] where she lives. She denies chest pain or n/v.       MEDICAL RECORD REVIEW      ALLERGIES  Accupril [quinapril hcl]; Ahist [chlorpheniramine]; Chlorcyclizine; Clarithromycin; Diclofenac sodium; Diphenhydramine; Esomeprazole; Ibuprofen; Levalbuterol; Levocetirizine; Lipitor [atorvastatin]; Lodine [etodolac]; Omeprazole; Pravachol [pravastatin]; Quinapril; Sulfa antibiotics; Sulindac; Valdecoxib; and Prednisone    PAST MEDICAL HISTORY  Active Ambulatory Problems     Diagnosis Date Noted   • Neck and shoulder pain 03/24/2017   • Arthropathy of shoulder region 03/24/2017   • Carpal tunnel syndrome of left wrist 03/24/2017   • Chronic pain of both shoulders 06/27/2017   • Chronic left shoulder pain 12/05/2017   • Arthropathy of left shoulder 12/05/2017   • Dysarthria 12/07/2017   • DM type 2 (diabetes mellitus, type 2) (CMS/[facility]) 12/07/2017   • PAF (paroxysmal atrial fibrillation) (CMS/[facility]) 12/07/2017   • HLD (hyperlipidemia) 12/07/2017   • Bronchitis 12/07/2017   • Coronary artery disease involving native coronary artery of native heart with angina pectoris (CMS/[facility]) 12/07/2017   • CVA (cerebral vascular accident) (CMS/[facility]) 12/10/2017   • HTN (hypertension) 01/14/2018   • CKD (chronic kidney disease) stage 3, GFR 30-59 ml/min  (CMS/[facility]) 01/14/2018   • Chronic combined systolic and diastolic congestive heart failure (CMS/[facility]) 01/15/2018   • Infection of prosthetic right knee joint (CMS/[facility]) 01/17/2018   • Stasis dermatitis of right lower extremity due to peripheral venous hypertension 04/28/2018   • Current use of long term anticoagulation 04/28/2018   • Morbidly obese (CMS/[facility]) 02/08/2019   • Acute respiratory failure with hypoxia (CMS/HCC) 09/16/2019     Resolved Ambulatory Problems     Diagnosis Date Noted   • Hypernatremia 01/15/2018     Past Medical History:   Diagnosis Date   • Aortic calcification (CMS/[facility])    • Aortic regurgitation    • Asthma    • CAD (coronary artery disease)    • Hypertension    • Mild mitral regurgitation    • Mitral annular calcification    • SSS (sick sinus syndrome) (CMS/[facility])    • Tricuspid regurgitation        PAST SURGICAL HISTORY  Past Surgical History:   Procedure Laterality Date   • CARDIAC CATHETERIZATION     • CARDIAC ELECTROPHYSIOLOGY PROCEDURE N/A 2/7/2020    Procedure: PPM generator change - dual  medtronic;  Surgeon: Kiel Field MD;  Location: [facility] INVASIVE LOCATION;  Service: Cardiology;  Laterality: N/A;   • CHOLECYSTECTOMY     • CORONARY STENT PLACEMENT     • HERNIA REPAIR      hital hernia   • HYSTERECTOMY     • PACEMAKER IMPLANTATION     • REPLACEMENT TOTAL KNEE Bilateral        FAMILY HISTORY  Family History   Problem Relation Age of Onset   • Heart disease Mother    • Hypertension Mother    • Heart disease Father    • Hypertension Father    • Hypertension Brother    • Heart disease Brother    • Diabetes Niece        SOCIAL HISTORY  Social History     Socioeconomic History   • Marital status: Single     Spouse name: Not on file   • Number of children: Not on file   • Years of education: Not on file   • Highest education level: Not on file   Tobacco Use   • Smoking status: Never Smoker   • Smokeless tobacco: Never Used   • Tobacco comment: caffeine use   Substance and Sexual  Activity   • Alcohol use: Not Currently   • Drug use: No   • Sexual activity: Defer   Lifestyle   • Physical activity:     Days per week: 2 days     Minutes per session: 60 min   • Stress: Not on file       REVIEW OF SYSTEMS  Review of Systems    All systems reviewed and negative except for those discussed in HPI.   PHYSICAL EXAM    ED Triage Vitals [02/11/20 1615]   Temp Heart Rate Resp BP SpO2   98.9 °F (37.2 °C) 82 18 169/98 95 %      Temp src Heart Rate Source Patient Position BP Location FiO2 (%)   Tympanic Monitor -- -- --     Physical Exam    I have reviewed the triage vital signs and nursing notes.      GENERAL: heavy breathing, not toxic appeariing  HENT: nares patent, mm moist, no post oropharynx erythema or tonsillar edema  EYES: no scleral icterus  NECK: no ROM limitations  CV: regular rhythm, regular rate, no MRG  RESPIRATORY: mild respiratory distress, audible wheezing and wheezing in most lung fields.  She has crackles RUL.   ABDOMEN: soft, rounded, BS x 4 quadrants  : deferred  MUSCULOSKELETAL: no deformity  NEURO: alert, moves all extremities, follows commands  SKIN: warm, dry      PROGRESS, DATA ANALYSIS, CONSULTS AND MEDICAL DECISION MAKING  All labs have been independently reviewed by me.  All radiology studies have been reviewed by me and discussed with radiologist dictating the report.  EKG's independently viewed and interpreted by me unless stated otherwise. Discussion below represents my analysis of pertinent findings related to patient's condition, differential diagnosis, treatment plan and final disposition.     Ddx: CHF, COPD exacerbation, pneumonia, influenza  ED Course as of Feb 11 2126   Tue Feb 11, 2020   1838 EKG          EKG time: 1733  Rhythm/Rate: 80 AV dual paced rhythm  P waves and KS: n/a  QRS, axis: QRS wide due to V pacing, LAD  ST and T waves: no acute ST or T wave abnormalities    Interpreted Contemporaneously by me, independently viewed  Unchanged from prior      [EP]  "  1909 Discussed with patient.    Human Rhinovirus/Enterovirus(!): Detected [EP]   1948 I spoke with KRISTIN Schmidt on-call for A.  Who agrees to admit to Dr. Joseph.  Monitored bed as an observation    [EP]      ED Course User Index  [EP] Umesh Patricia KRISTIN Wallace       1950:Reviewed pt's history and workup with Dr. Randall.  After a bedside evaluation, Dr. Randall agrees with the plan of care.      Disposition vitals:  /97 (BP Location: Right arm, Patient Position: Sitting)   Pulse 82   Temp 97.6 °F (36.4 °C) (Oral)   Resp 18   Ht 160 cm (63\")   Wt 99.5 kg (219 lb 4.8 oz)   SpO2 93%   BMI 38.85 kg/m²       DIAGNOSIS  Final diagnoses:   Rhinovirus   Wheezing on auscultation       Admission     Umesh Patricia KRISTIN Wallace  02/11/20 2126    "

## 2020-02-12 PROBLEM — J96.01 ACUTE RESPIRATORY FAILURE WITH HYPOXEMIA (HCC): Status: ACTIVE | Noted: 2020-02-12

## 2020-02-12 LAB
ANION GAP SERPL CALCULATED.3IONS-SCNC: 15.8 MMOL/L (ref 5–15)
BUN BLD-MCNC: 21 MG/DL (ref 8–23)
BUN/CREAT SERPL: 22.3 (ref 7–25)
CALCIUM SPEC-SCNC: 9.2 MG/DL (ref 8.6–10.5)
CHLORIDE SERPL-SCNC: 102 MMOL/L (ref 98–107)
CO2 SERPL-SCNC: 23.2 MMOL/L (ref 22–29)
CREAT BLD-MCNC: 0.94 MG/DL (ref 0.57–1)
DEPRECATED RDW RBC AUTO: 42.8 FL (ref 37–54)
ERYTHROCYTE [DISTWIDTH] IN BLOOD BY AUTOMATED COUNT: 12.4 % (ref 12.3–15.4)
GFR SERPL CREATININE-BSD FRML MDRD: 57 ML/MIN/1.73
GLUCOSE BLD-MCNC: 253 MG/DL (ref 65–99)
GLUCOSE BLDC GLUCOMTR-MCNC: 223 MG/DL (ref 70–130)
GLUCOSE BLDC GLUCOMTR-MCNC: 236 MG/DL (ref 70–130)
GLUCOSE BLDC GLUCOMTR-MCNC: 264 MG/DL (ref 70–130)
GLUCOSE BLDC GLUCOMTR-MCNC: 285 MG/DL (ref 70–130)
HBA1C MFR BLD: 6.5 % (ref 4.8–5.6)
HCT VFR BLD AUTO: 40.4 % (ref 34–46.6)
HGB BLD-MCNC: 13.1 G/DL (ref 12–15.9)
INR PPP: 1.64 (ref 0.9–1.1)
MCH RBC QN AUTO: 31.1 PG (ref 26.6–33)
MCHC RBC AUTO-ENTMCNC: 32.4 G/DL (ref 31.5–35.7)
MCV RBC AUTO: 96 FL (ref 79–97)
PLATELET # BLD AUTO: 156 10*3/MM3 (ref 140–450)
PMV BLD AUTO: 9.9 FL (ref 6–12)
POTASSIUM BLD-SCNC: 4.2 MMOL/L (ref 3.5–5.2)
PROTHROMBIN TIME: 19.1 SECONDS (ref 11.7–14.2)
RBC # BLD AUTO: 4.21 10*6/MM3 (ref 3.77–5.28)
SODIUM BLD-SCNC: 141 MMOL/L (ref 136–145)
WBC NRBC COR # BLD: 10.8 10*3/MM3 (ref 3.4–10.8)

## 2020-02-12 PROCEDURE — 25010000002 METHYLPREDNISOLONE PER 40 MG: Performed by: INTERNAL MEDICINE

## 2020-02-12 PROCEDURE — 97110 THERAPEUTIC EXERCISES: CPT

## 2020-02-12 PROCEDURE — 97161 PT EVAL LOW COMPLEX 20 MIN: CPT

## 2020-02-12 PROCEDURE — 94799 UNLISTED PULMONARY SVC/PX: CPT

## 2020-02-12 PROCEDURE — 85027 COMPLETE CBC AUTOMATED: CPT | Performed by: NURSE PRACTITIONER

## 2020-02-12 PROCEDURE — 92610 EVALUATE SWALLOWING FUNCTION: CPT

## 2020-02-12 PROCEDURE — 82962 GLUCOSE BLOOD TEST: CPT

## 2020-02-12 PROCEDURE — 85610 PROTHROMBIN TIME: CPT | Performed by: INTERNAL MEDICINE

## 2020-02-12 PROCEDURE — 83036 HEMOGLOBIN GLYCOSYLATED A1C: CPT | Performed by: NURSE PRACTITIONER

## 2020-02-12 PROCEDURE — 80048 BASIC METABOLIC PNL TOTAL CA: CPT | Performed by: NURSE PRACTITIONER

## 2020-02-12 PROCEDURE — 63710000001 INSULIN LISPRO (HUMAN) PER 5 UNITS: Performed by: NURSE PRACTITIONER

## 2020-02-12 RX ORDER — METHYLPREDNISOLONE SODIUM SUCCINATE 40 MG/ML
40 INJECTION, POWDER, LYOPHILIZED, FOR SOLUTION INTRAMUSCULAR; INTRAVENOUS EVERY 12 HOURS
Status: DISCONTINUED | OUTPATIENT
Start: 2020-02-12 | End: 2020-02-13 | Stop reason: HOSPADM

## 2020-02-12 RX ADMIN — KETOCONAZOLE: 20 CREAM TOPICAL at 08:53

## 2020-02-12 RX ADMIN — SODIUM CHLORIDE, PRESERVATIVE FREE 10 ML: 5 INJECTION INTRAVENOUS at 10:21

## 2020-02-12 RX ADMIN — METHYLPREDNISOLONE SODIUM SUCCINATE 40 MG: 40 INJECTION, POWDER, FOR SOLUTION INTRAMUSCULAR; INTRAVENOUS at 22:31

## 2020-02-12 RX ADMIN — FUROSEMIDE 20 MG: 20 TABLET ORAL at 06:13

## 2020-02-12 RX ADMIN — CEPHALEXIN 500 MG: 500 CAPSULE ORAL at 08:52

## 2020-02-12 RX ADMIN — IPRATROPIUM BROMIDE AND ALBUTEROL SULFATE 3 ML: 2.5; .5 SOLUTION RESPIRATORY (INHALATION) at 07:59

## 2020-02-12 RX ADMIN — FLUTICASONE PROPIONATE 1 SPRAY: 50 SPRAY, METERED NASAL at 21:18

## 2020-02-12 RX ADMIN — WARFARIN SODIUM 4 MG: 4 TABLET ORAL at 17:55

## 2020-02-12 RX ADMIN — INSULIN LISPRO 6 UNITS: 100 INJECTION, SOLUTION INTRAVENOUS; SUBCUTANEOUS at 22:17

## 2020-02-12 RX ADMIN — FLUTICASONE PROPIONATE 1 SPRAY: 50 SPRAY, METERED NASAL at 08:53

## 2020-02-12 RX ADMIN — ROSUVASTATIN CALCIUM 20 MG: 20 TABLET, FILM COATED ORAL at 21:18

## 2020-02-12 RX ADMIN — METHYLPREDNISOLONE SODIUM SUCCINATE 40 MG: 40 INJECTION, POWDER, FOR SOLUTION INTRAMUSCULAR; INTRAVENOUS at 10:21

## 2020-02-12 RX ADMIN — MONTELUKAST SODIUM 10 MG: 10 TABLET, FILM COATED ORAL at 21:18

## 2020-02-12 RX ADMIN — CARVEDILOL 3.12 MG: 3.12 TABLET, FILM COATED ORAL at 08:52

## 2020-02-12 RX ADMIN — OXYBUTYNIN CHLORIDE 10 MG: 10 TABLET, EXTENDED RELEASE ORAL at 21:18

## 2020-02-12 RX ADMIN — INSULIN LISPRO 4 UNITS: 100 INJECTION, SOLUTION INTRAVENOUS; SUBCUTANEOUS at 08:52

## 2020-02-12 RX ADMIN — ASPIRIN 81 MG: 81 TABLET, COATED ORAL at 08:52

## 2020-02-12 RX ADMIN — ACETAMINOPHEN 650 MG: 325 TABLET, FILM COATED ORAL at 21:20

## 2020-02-12 RX ADMIN — METHYLPREDNISOLONE SODIUM SUCCINATE 40 MG: 40 INJECTION, POWDER, FOR SOLUTION INTRAMUSCULAR; INTRAVENOUS at 01:08

## 2020-02-12 RX ADMIN — CETIRIZINE HYDROCHLORIDE 10 MG: 10 TABLET, FILM COATED ORAL at 21:17

## 2020-02-12 RX ADMIN — CETIRIZINE HYDROCHLORIDE 10 MG: 10 TABLET, FILM COATED ORAL at 08:52

## 2020-02-12 RX ADMIN — INSULIN LISPRO 6 UNITS: 100 INJECTION, SOLUTION INTRAVENOUS; SUBCUTANEOUS at 17:55

## 2020-02-12 RX ADMIN — IPRATROPIUM BROMIDE AND ALBUTEROL SULFATE 3 ML: 2.5; .5 SOLUTION RESPIRATORY (INHALATION) at 20:29

## 2020-02-12 RX ADMIN — WARFARIN 3 MG: 3 TABLET ORAL at 01:08

## 2020-02-12 RX ADMIN — IPRATROPIUM BROMIDE AND ALBUTEROL SULFATE 3 ML: 2.5; .5 SOLUTION RESPIRATORY (INHALATION) at 11:38

## 2020-02-12 RX ADMIN — CEPHALEXIN 500 MG: 500 CAPSULE ORAL at 21:18

## 2020-02-12 RX ADMIN — IPRATROPIUM BROMIDE AND ALBUTEROL SULFATE 3 ML: 2.5; .5 SOLUTION RESPIRATORY (INHALATION) at 17:05

## 2020-02-12 RX ADMIN — SODIUM CHLORIDE, PRESERVATIVE FREE 10 ML: 5 INJECTION INTRAVENOUS at 21:20

## 2020-02-12 RX ADMIN — INSULIN LISPRO 4 UNITS: 100 INJECTION, SOLUTION INTRAVENOUS; SUBCUTANEOUS at 12:56

## 2020-02-12 RX ADMIN — CARVEDILOL 3.12 MG: 3.12 TABLET, FILM COATED ORAL at 21:17

## 2020-02-12 NOTE — PROGRESS NOTES
Pharmacy Consult: Warfarin Dosing/ Monitoring     Caitlyn Longoria is a 85 y.o. female, estimated creatinine clearance is 50.3 mL/min (by C-G formula based on SCr of 0.92 mg/dL). weighing 99.5 kg (219 lb 4.8 oz).      has a past medical history of Aortic calcification (CMS/[facility]), Aortic regurgitation, Asthma, CAD (coronary artery disease), Chronic combined systolic and diastolic congestive heart failure (CMS/[facility]), CKD (chronic kidney disease) stage 3, GFR 30-59 ml/min (CMS/[facility]), Coronary artery disease involving native coronary artery of native heart with angina pectoris (CMS/[facility]), DM type 2 (diabetes mellitus, type 2) (CMS/[facility]), Hypertension, Mild mitral regurgitation, Mitral annular calcification, PAF (paroxysmal atrial fibrillation) (CMS/[facility]), SSS (sick sinus syndrome) (CMS/[facility]), and Tricuspid regurgitation.     Social History           Tobacco Use   • Smoking status: Never Smoker   • Smokeless tobacco: Never Used   • Tobacco comment: caffeine use   Substance Use Topics   • Alcohol use: Not Currently   • Drug use: No                Results from last 7 days   Lab Units 02/11/20  1748 02/07/20  0850 02/07/20  0849   INR   1.57* 1.4* 1.4*   HEMOGLOBIN g/dL 12.7  --   --    HEMATOCRIT % 39.8  --   --    PLATELETS 10*3/mm3 153  --   --            Results from last 7 days   Lab Units 02/11/20  1748   SODIUM mmol/L 141   POTASSIUM mmol/L 3.5   CHLORIDE mmol/L 104   CO2 mmol/L 26.6   BUN mg/dL 21   CREATININE mg/dL 0.92   CALCIUM mg/dL 9.4   BILIRUBIN mg/dL 0.7   ALK PHOS U/L 71   ALT (SGPT) U/L 18   AST (SGOT) U/L 25   GLUCOSE mg/dL 129*      Anticoagulation history: 2 mg on Tuesday and Saturday, and 4 mg on Sunday, Monday, Wednesday, Thursday, and Friday     Hospital Anticoagulation:  Consulting provider: Dr Jv Joseph  Start date: 2/11/2020  Indication: A Fib  Target INR: 2-3  Expected duration:     Bridge Therapy:                     Date 2/7 2/11                   INR 1.4 1.57                   Warfarin dose                           Potential drug interactions: Cephalexin     Relevant nutrition status:       Other:       Education complete?/ Date:       Assessment/Plan:  Dose : 3 mg on Tuesday and Saturday, and 4 mg on Sunday, Monday, Wednesday, Thursday, and Friday  Monitor INR daily  Follow up Daily     Pharmacy will continue to follow until discharge or discontinuation of warfarin.   Yannick Mccain RPH  2/11/2020

## 2020-02-12 NOTE — PLAN OF CARE
Problem: Patient Care Overview  Goal: Plan of Care Review  Flowsheets (Taken 2/12/2020 113)  Plan of Care Reviewed With: patient  Outcome Summary: Pt. admitted to the hospital with SOA, increased cough, and diagnosed with Rhinovirus.  Pt. reports that prior to admission she was independent with functional mobility and use of RWx for ambulation.  Pt. currently presents with decreased strength, decreased balance, and decreased tolerance to functional activity. Pt. will benefit from skilled inpt. P.T. to address her functional deficits and to assist pt. in regaining her maximum level of independence with functional mobility.

## 2020-02-12 NOTE — PLAN OF CARE
Problem: Patient Care Overview  Goal: Plan of Care Review  Outcome: Ongoing (interventions implemented as appropriate)  Flowsheets (Taken 2/12/2020 2511)  Plan of Care Reviewed With: patient  Note:   Bedside swallow eval completed. Recommend mechanical soft no mixed consistencies and thin liquids no straws. Meds whole with puree or as tolerated. Recommend upright, slow rate, alternate liquids/solids. ST to s/o. Recommend patient follow upright primary OP therapist, pt in agreement.

## 2020-02-12 NOTE — PROGRESS NOTES
Name: Caitlyn Longoria ADMIT: 2020   : 1934  PCP: Zenaida Suarez MD    MRN: [id number] LOS: 0 days   AGE/SEX: 85 y.o. female  ROOM: [facility]     Subjective   Subjective   Feeling better.  Still coughing but not as bad as yesterday.  Leg swelling is decreased compared to yesterday.  Energy levels a little better.  Was not on oxygen prior to admission    Review of Systems     Objective   Objective   Vital Signs  Temp:  [97 °F (36.1 °C)-97.6 °F (36.4 °C)] 97.3 °F (36.3 °C)  Heart Rate:  [68-85] 81  Resp:  [18-24] 18  BP: (126-169)/(73-98) 140/73  SpO2:  [93 %-100 %] 95 %  on  Flow (L/min):  [2-3] 2;   Device (Oxygen Therapy): nasal cannula  Body mass index is 38.83 kg/m².  Physical Exam   Constitutional: She appears well-developed and well-nourished. No distress.   Obese   HENT:   Head: Normocephalic.   Mouth/Throat: No oropharyngeal exudate.   Eyes: Pupils are equal, round, and reactive to light.   Neck: Neck supple. No JVD present.   Cardiovascular: Normal rate and regular rhythm.   Pulmonary/Chest:   Decreased breath sounds left base compared to right.  Light expiratory wheezing more on the right.  Few crackles right greater than left   Abdominal: Soft. Bowel sounds are normal. She exhibits no distension. There is no tenderness.   No hepatosplenomegaly.  Obese   Musculoskeletal: She exhibits edema.   1+   Neurological: She is alert.   Skin: Skin is warm. She is not diaphoretic.   Psychiatric: She has a normal mood and affect.   Nursing note and vitals reviewed.      Results Review:       I reviewed the patient's new clinical results.  Results from last 7 days   Lab Units 20  0647 20  1748   WBC 10*3/mm3 10.80 10.41   HEMOGLOBIN g/dL 13.1 12.7   PLATELETS 10*3/mm3 156 153     Results from last 7 days   Lab Units 20  0647 20  1748   SODIUM mmol/L 141 141   POTASSIUM mmol/L 4.2 3.5   CHLORIDE mmol/L 102 104   CO2 mmol/L 23.2 26.6   BUN mg/dL 21 21   CREATININE mg/dL  0.94 0.92   GLUCOSE mg/dL 253* 129*   Estimated Creatinine Clearance: 49.2 mL/min (by C-G formula based on SCr of 0.94 mg/dL).  Results from last 7 days   Lab Units 02/11/20  1748   ALBUMIN g/dL 4.30   BILIRUBIN mg/dL 0.7   ALK PHOS U/L 71   AST (SGOT) U/L 25   ALT (SGPT) U/L 18     Results from last 7 days   Lab Units 02/12/20  0647 02/11/20  1748   CALCIUM mg/dL 9.2 9.4   ALBUMIN g/dL  --  4.30       Hemoglobin A1C   Date/Time Value Ref Range Status   02/12/2020 0647 6.50 (H) 4.80 - 5.60 % Final     Glucose   Date/Time Value Ref Range Status   02/12/2020 1158 223 (H) 70 - 130 mg/dL Final   02/12/2020 0615 236 (H) 70 - 130 mg/dL Final         aspirin 81 mg Oral Daily   carvedilol 3.125 mg Oral BID   cephalexin 500 mg Oral Q12H   cetirizine 10 mg Oral BID   fluticasone 1 spray Nasal BID   furosemide 20 mg Oral QAM   insulin lispro 0-9 Units Subcutaneous 4x Daily With Meals & Nightly   ipratropium-albuterol 3 mL Nebulization 4x Daily - RT   ketoconazole  Topical Daily   losartan 25 mg Oral Daily   methylPREDNISolone sodium succinate 40 mg Intravenous Q8H   montelukast 10 mg Oral Nightly   oxybutynin XL 10 mg Oral Nightly   rosuvastatin 20 mg Oral Nightly   sodium chloride 10 mL Intravenous Q12H   warfarin 3 mg Oral Once per day on Tue Sat   warfarin 4 mg Oral Once per day on Sun Mon Wed Thu Fri       Pharmacy to dose warfarin    Diet Regular; Cardiac, Consistent Carbohydrate       Assessment/Plan     Active Hospital Problems    Diagnosis  POA   • Rhinovirus [B34.8]  Yes   • Asthma with acute exacerbation [J45.901]  Unknown   • Acute respiratory failure with hypoxia (CMS/HCC) [J96.01]  Yes   • Current use of long term anticoagulation [Z79.01]  Not Applicable   • Chronic combined systolic and diastolic congestive heart failure (CMS/HCC) [I50.42]  Yes   • HTN (hypertension) [I10]  Yes   • CKD (chronic kidney disease) stage 3, GFR 30-59 ml/min (CMS/HCC) [N18.3]  Yes   • DM type 2 (diabetes mellitus, type 2) (CMS/HCC)  [E11.9]  Yes   • PAF (paroxysmal atrial fibrillation) (CMS/[facility]) [I48.0]  Yes   • Coronary artery disease involving native coronary artery of native heart with angina pectoris (CMS/[facility]) [I25.119]  Yes      Resolved Hospital Problems   No resolved problems to display.       · Acute hypoxic respiratory failure secondary to rhinovirus with acute asthma exacerbation.  Some improvement.  Decrease IV steroids to twice daily.  Should be able to change to oral steroids tomorrow.  Will check exercise oximetry tomorrow to assess need for home O2  · Chronic systolic and diastolic congestive heart failure.  Leg swelling was worse but now is much better.  Continue home meds.  · Diabetes mellitus type 2.  Oral agent on hold.  Sliding scale as needed.  A1c acceptable at 6.5.  Decrease steroids  · PAF.  Continue Coumadin-pharmacy dosing.  Rate controlled.  Sinus currently  · Chronic kidney disease stage III, stable.  GFR 57  · Anticipate discharge home in 1-2 days.      Christie Ray MD  [facility]  02/12/20  4:56 PM

## 2020-02-12 NOTE — THERAPY EVALUATION
Acute Care - Speech Language Pathology   Swallow Initial Evaluation [facility]     Patient Name: Caitlyn Longoria  : 1934  MRN: [id number]  Today's Date: 2020               Admit Date: 2020    Visit Dx:     ICD-10-CM ICD-9-CM   1. Rhinovirus B34.8 079.3   2. Wheezing on auscultation R06.2 786.07     Patient Active Problem List   Diagnosis   • Neck and shoulder pain   • Arthropathy of shoulder region   • Carpal tunnel syndrome of left wrist   • Chronic pain of both shoulders   • Chronic left shoulder pain   • Arthropathy of left shoulder   • Dysarthria   • DM type 2 (diabetes mellitus, type 2) (CMS/[facility])   • PAF (paroxysmal atrial fibrillation) (CMS/[facility])   • HLD (hyperlipidemia)   • Bronchitis   • Coronary artery disease involving native coronary artery of native heart with angina pectoris (CMS/[facility])   • CVA (cerebral vascular accident) (CMS/[facility])   • HTN (hypertension)   • CKD (chronic kidney disease) stage 3, GFR 30-59 ml/min (CMS/[facility])   • Chronic combined systolic and diastolic congestive heart failure (CMS/[facility])   • Infection of prosthetic right knee joint (CMS/[facility])   • Stasis dermatitis of right lower extremity due to peripheral venous hypertension   • Current use of long term anticoagulation   • Morbidly obese (CMS/[facility])   • Acute respiratory failure with hypoxia (CMS/[facility])   • Rhinovirus   • Asthma with acute exacerbation     Past Medical History:   Diagnosis Date   • Aortic calcification (CMS/[facility])     mild, on echo 2017   • Aortic regurgitation     Trace   • Asthma    • CAD (coronary artery disease)    • Chronic combined systolic and diastolic congestive heart failure (CMS/HCC)    • CKD (chronic kidney disease) stage 3, GFR 30-59 ml/min (CMS/HCC)    • Coronary artery disease involving native coronary artery of native heart with angina pectoris (CMS/HCC)    • DM type 2 (diabetes mellitus, type 2) (CMS/HCC)    • Hypertension    • Mild mitral regurgitation    • Mitral annular calcification      12/8/2017- echo, moderate   • PAF (paroxysmal atrial fibrillation) (CMS/HCC)    • SSS (sick sinus syndrome) (CMS/HCC)    • Tricuspid regurgitation     Trace     Past Surgical History:   Procedure Laterality Date   • CARDIAC CATHETERIZATION     • CARDIAC ELECTROPHYSIOLOGY PROCEDURE N/A 2/7/2020    Procedure: PPM generator change - dual  medtronic;  Surgeon: Kiel Field MD;  Location: [facility] CATH INVASIVE LOCATION;  Service: Cardiology;  Laterality: N/A;   • CHOLECYSTECTOMY     • CORONARY STENT PLACEMENT     • HERNIA REPAIR      hital hernia   • HYSTERECTOMY     • PACEMAKER IMPLANTATION     • REPLACEMENT TOTAL KNEE Bilateral         SWALLOW EVALUATION (last 72 hours)      SLP Adult Swallow Evaluation     Row Name 02/12/20 1356          Document Type  evaluation  -OC    Patient Observations  alert;cooperative  -OC    Patient Effort  good  -OC    Symptoms Noted During/After Treatment  none  -OC          Patient Profile Reviewed  yes  -OC    Pertinent History Of Current Problem  Patient admitted for rhinovirus. Patient current with ST on OP basis. Recent FEES.  -OC    Current Method of Nutrition  regular textures;thin liquids  -OC    Precautions/Limitations, Vision  WFL  -OC    Precautions/Limitations, Hearing  WFL  -OC    Prior Level of Function-Communication  WFL  -OC    Prior Level of Function-Swallowing  mechanical soft with no mixed consistencies;thin liquids  -OC    Plans/Goals Discussed with  patient  -OC    Barriers to Rehab  none identified  -OC    Patient's Goals for Discharge  patient did not state  -OC          Pain Scale: Numbers, Pretreatment  0/10 - no pain  -OC    Pain Scale: Numbers, Post-Treatment  0/10 - no pain  -OC          Dentition Assessment  natural, present and adequate  -OC    Secretion Management  WNL/WFL  -OC    Mucosal Quality  moist, healthy  -OC    Volitional Swallow  WFL  -OC          Oral Motor General Assessment  WFL  -OC          Oral Prep Phase  WFL  -OC    Oral Transit   WFL  -OC    Oral Residue  WFL  -OC    Pharyngeal Phase  -- known pharyngeal impairment secondary to FEES this AM.  -OC    Clinical Swallow Evaluation Summary  Recent FEES recommend mechanical soft no mixed and thin liquids. Patient able to recall eval, results, and strategies. Patient demonstrated no overt s/s aspiration with thin, puree, and mechanical soft this date.   -OC          SLP Swallowing Diagnosis  functional oral phase;mild;pharyngeal dysfunction  -OC    Functional Impact  risk of aspiration/pneumonia  -OC    Rehab Potential/Prognosis, Swallowing  good, to achieve stated therapy goals  -OC    Swallow Criteria for Skilled Therapeutic Interventions Met  other (see comments) Patient to continue OP services with primary therapist  -OC          Therapy Frequency (Swallow)  evaluation only  -OC    Predicted Duration Therapy Intervention (Days)  until discharge  -OC    SLP Diet Recommendation  mechanical soft with no mixed consistencies;thin liquids  -OC    Recommended Precautions and Strategies  no straw;upright posture during/after eating;small bites of food and sips of liquid;alternate between small bites of food and sips of liquid  -OC    SLP Rec. for Method of Medication Administration  as tolerated  -OC    Monitor for Signs of Aspiration  yes;notify SLP if any concerns  -OC    Anticipated Dischage Disposition  home with OP services  -OC      User Key  (r) = Recorded By, (t) = Taken By, (c) = Cosigned By    Initials Name Effective Dates    OC Mk, NENITA Thomas,CCC-SLP 06/08/18 -           EDUCATION  The patient has been educated in the following areas:   Dysphagia (Swallowing Impairment).    SLP Recommendation and Plan  SLP Swallowing Diagnosis: functional oral phase, mild, pharyngeal dysfunction  SLP Diet Recommendation: mechanical soft with no mixed consistencies, thin liquids  Recommended Precautions and Strategies: no straw, upright posture during/after eating, small bites of food and sips of liquid,  alternate between small bites of food and sips of liquid  SLP Rec. for Method of Medication Administration: as tolerated     Monitor for Signs of Aspiration: yes, notify SLP if any concerns     Swallow Criteria for Skilled Therapeutic Interventions Met: other (see comments)(Patient to continue OP services with primary therapist)  Anticipated Dischage Disposition: home with OP services  Rehab Potential/Prognosis, Swallowing: good, to achieve stated therapy goals  Therapy Frequency (Swallow): evaluation only  Predicted Duration Therapy Intervention (Days): until discharge       Plan of Care Reviewed With: patient         SLP Outcome Measures (last 72 hours)      SLP Outcome Measures     Row Name 02/12/20 4000             SLP Outcome Measures    Outcome Measure Used?  Adult NOMS  -OC         Adult FCM Scores    FCM Chosen  Swallowing  -OC      Swallowing FCM Score  4  -OC        User Key  (r) = Recorded By, (t) = Taken By, (c) = Cosigned By    Initials Name Effective Dates    OC Martha Terrazas MA,CCC-SLP 06/08/18 -            Time Calculation:       Therapy Charges for Today     Code Description Service Date Service Provider Modifiers Qty    [id number] HC ST EVAL ORAL PHARYNG SWALLOW 3 2/12/2020 Martha Terrazas MA,CCC-SLP GN 1               Martha Terrazas MA,CCC-SLP  2/12/2020

## 2020-02-12 NOTE — PROGRESS NOTES
Pharmacy Consult: Warfarin Dosing/ Monitoring    Caitlyn Longoria is a 85 y.o. female, estimated creatinine clearance is 49.2 mL/min (by C-G formula based on SCr of 0.94 mg/dL). weighing 99.4 kg (219 lb 3.2 oz).    She has a past medical history of Aortic calcification (CMS/[facility]), Aortic regurgitation, Asthma, CAD (coronary artery disease), Chronic combined systolic and diastolic congestive heart failure (CMS/[facility]), CKD (chronic kidney disease) stage 3, GFR 30-59 ml/min (CMS/[facility]), Coronary artery disease involving native coronary artery of native heart with angina pectoris (CMS/[facility]), DM type 2 (diabetes mellitus, type 2) (CMS/[facility]), Hypertension, Mild mitral regurgitation, Mitral annular calcification, PAF (paroxysmal atrial fibrillation) (CMS/[facility]), SSS (sick sinus syndrome) (CMS/[facility]), and Tricuspid regurgitation.    Social History     Tobacco Use    Smoking status: Never Smoker    Smokeless tobacco: Never Used    Tobacco comment: caffeine use   Substance Use Topics    Alcohol use: Not Currently    Drug use: No       Results from last 7 days   Lab Units 02/12/20  0647 02/11/20  1748 02/07/20  0850 02/07/20  0849   INR  1.64* 1.57* 1.4* 1.4*   HEMOGLOBIN g/dL 13.1 12.7  --   --    HEMATOCRIT % 40.4 39.8  --   --    PLATELETS 10*3/mm3 156 153  --   --      Results from last 7 days   Lab Units 02/12/20  0647 02/11/20  1748   SODIUM mmol/L 141 141   POTASSIUM mmol/L 4.2 3.5   CHLORIDE mmol/L 102 104   CO2 mmol/L 23.2 26.6   BUN mg/dL 21 21   CREATININE mg/dL 0.94 0.92   CALCIUM mg/dL 9.2 9.4   BILIRUBIN mg/dL  --  0.7   ALK PHOS U/L  --  71   ALT (SGPT) U/L  --  18   AST (SGOT) U/L  --  25   GLUCOSE mg/dL 253* 129*     Anticoagulation history: 2 mg on Tuesday and Saturday, and 4 mg on Sunday, Monday, Wednesday, Thursday, and Friday    Hospital Anticoagulation:  Consulting provider: Dr Jv Joseph  Start date: 2/11/2020  Indication: A Fib  Target INR: 2-3  Expected duration:     Bridge Therapy:                     Date  2/7 2/11 2/12          INR 1.4 1.57 1.64          Warfarin dose  3mg 4mg            Potential drug interactions: Cephalexin    Relevant nutrition status:  regular    Education complete?/ Date:  defer, pt takes warfarin PTA    Assessment/Plan:  INR subtherapeutic but trending up.  Received warfarin 3mg last night.  Currently on warfarin 4mg MoWeThFrSu, 3mg TuSa.  Modified from home regimen of 2mg on TuSa.  Continue current dose for now - 4mg due tonight.    Monitor INR daily.  H/H stable.  Monitor s/sx of bleeding.    Pharmacy will continue to follow until discharge or discontinuation of warfarin.   aTtum Waldrop RPH  2/12/2020

## 2020-02-12 NOTE — THERAPY EVALUATION
Patient Name: Caitlyn Longoria  : 1934    MRN: [id number]                              Today's Date: 2020       Admit Date: 2020    Visit Dx:     ICD-10-CM ICD-9-CM   1. Rhinovirus B34.8 079.3   2. Wheezing on auscultation R06.2 786.07     Patient Active Problem List   Diagnosis   • Neck and shoulder pain   • Arthropathy of shoulder region   • Carpal tunnel syndrome of left wrist   • Chronic pain of both shoulders   • Chronic left shoulder pain   • Arthropathy of left shoulder   • Dysarthria   • DM type 2 (diabetes mellitus, type 2) (CMS/[facility], located [facility])   • PAF (paroxysmal atrial fibrillation) (CMS/[facility], located within [facility])   • HLD (hyperlipidemia)   • Bronchitis   • Coronary artery disease involving native coronary artery of native heart with angina pectoris (CMS/[facility] located [facility])   • CVA (cerebral vascular accident) (CMS/[facility] located [facility])   • HTN (hypertension)   • CKD (chronic kidney disease) stage 3, GFR 30-59 ml/min (CMS/[facility])   • Chronic combined systolic and diastolic congestive heart failure (CMS/[facility])   • Infection of prosthetic right knee joint (CMS/[facility])   • Stasis dermatitis of right lower extremity due to peripheral venous hypertension   • Current use of long term anticoagulation   • Morbidly obese (CMS/[facility])   • Acute respiratory failure with hypoxia (CMS/[facility] located [facility])   • Rhinovirus   • Asthma with acute exacerbation     Past Medical History:   Diagnosis Date   • Aortic calcification (CMS/[facility])     mild, on echo 2017   • Aortic regurgitation     Trace   • Asthma    • CAD (coronary artery disease)    • Chronic combined systolic and diastolic congestive heart failure (CMS/[facility])    • CKD (chronic kidney disease) stage 3, GFR 30-59 ml/min (CMS/[facility])    • Coronary artery disease involving native coronary artery of native heart with angina pectoris (CMS/[facility])    • DM type 2 (diabetes mellitus, type 2) (CMS/[facility])    • Hypertension    • Mild mitral regurgitation    • Mitral annular calcification     2017- echo, moderate   • PAF (paroxysmal atrial  "fibrillation) (CMS/HCC)    • SSS (sick sinus syndrome) (CMS/HCC)    • Tricuspid regurgitation     Trace     Past Surgical History:   Procedure Laterality Date   • CARDIAC CATHETERIZATION     • CARDIAC ELECTROPHYSIOLOGY PROCEDURE N/A 2/7/2020    Procedure: PPM generator change - dual  medtronic;  Surgeon: Kiel Field MD;  Location:  [facility];  Service: Cardiology;  Laterality: N/A;   • CHOLECYSTECTOMY     • CORONARY STENT PLACEMENT     • HERNIA REPAIR      hital hernia   • HYSTERECTOMY     • PACEMAKER IMPLANTATION     • REPLACEMENT TOTAL KNEE Bilateral      General Information     Row Name 02/12/20 1131          PT Evaluation Time/Intention    Document Type  evaluation Pt. admitted with SOA, Cough and diagnosed Rhinovirus.  Pt. reports mild SOA this AM but overall feeling \"better\" compared to yesterday.  Pt. agreeable to work with P.T.   -MS     Mode of Treatment  physical therapy;individual therapy  -MS     Row Name 02/12/20 1131          General Information    Patient Profile Reviewed?  yes  -MS     Prior Level of Function  independent: With use of RWx.   -MS     Existing Precautions/Restrictions  fall;oxygen therapy device and L/min 2 liters oxygen; Exit alarm; Droplet Isolation  -MS     Barriers to Rehab  none identified  -MS     Row Name 02/12/20 1131          Cognitive Assessment/Intervention- PT/OT    Orientation Status (Cognition)  oriented x 3  -MS     Row Name 02/12/20 1131          Safety Issues, Functional Mobility    Comment, Safety Issues/Impairments (Mobility)  Gait belt used for safety.   -MS       User Key  (r) = Recorded By, (t) = Taken By, (c) = Cosigned By    Initials Name Provider Type    MS Sergey Messer, PT Physical Therapist        Mobility     Row Name 02/12/20 1133          Bed Mobility Assessment/Treatment    Bed Mobility Assessment/Treatment  supine-sit;sit-supine  -MS     Supine-Sit [place] (Bed Mobility)  contact guard  -MS     Sit-Supine " [place] (Bed Mobility)  contact guard  -MS     Row Name 02/12/20 1133          Sit-Stand Transfer    Sit-Stand [place] (Transfers)  contact guard  -MS     Assistive Device (Sit-Stand Transfers)  walker, front-wheeled  -MS     Row Name 02/12/20 1133          Gait/Stairs Assessment/Training    [place] Level (Gait)  contact guard  -MS     Assistive Device (Gait)  walker, front-wheeled  -MS     Distance in Feet (Gait)  200 feet  -MS     Pattern (Gait)  step-through  -MS     Bilateral Gait Deviations  forward flexed posture  -MS     Comment (Gait/Stairs)  Verbal/tactile cues for posture correction.   -MS       User Key  (r) = Recorded By, (t) = Taken By, (c) = Cosigned By    Initials Name Provider Type    Sergey Yang, PT Physical Therapist        Obj/Interventions     Row Name 02/12/20 1134          General ROM    GENERAL ROM COMMENTS  BUE/LE (WFL's)  -MS     Row Name 02/12/20 1134          MMT (Manual Muscle Testing)    General MMT Comments  BUE/LE (3+/5)   -MS       User Key  (r) = Recorded By, (t) = Taken By, (c) = Cosigned By    Initials Name Provider Type    MS MesserSergey, PT Physical Therapist        Goals/Plan     Row Name 02/12/20 1135          Bed Mobility Goal 1 (PT)    Activity/Assistive Device (Bed Mobility Goal 1, PT)  bed mobility activities, all  -MS     [place] Level/Cues Needed (Bed Mobility Goal 1, PT)  independent  -MS     Time Frame (Bed Mobility Goal 1, PT)  long term goal (LTG);5 days  -MS     Row Name 02/12/20 1135          Transfer Goal 1 (PT)    Activity/Assistive Device (Transfer Goal 1, PT)  transfers, all;walker, rolling  -MS     [place] Level/Cues Needed (Transfer Goal 1, PT)  independent  -MS     Time Frame (Transfer Goal 1, PT)  long term goal (LTG);5 days  -MS     Row Name 02/12/20 1135          Gait Training Goal 1 (PT)    Activity/Assistive Device (Gait Training Goal 1, PT)  gait (walking locomotion);walker, rolling  -MS     [place] Level  (Gait Training Goal 1, PT)  independent  -MS     Distance (Gait Goal 1, PT)  300 feet  -MS     Time Frame (Gait Training Goal 1, PT)  long term goal (LTG);5 days  -MS       User Key  (r) = Recorded By, (t) = Taken By, (c) = Cosigned By    Initials Name Provider Type    Sergey Yang BRAYAN, PT Physical Therapist        Clinical Impression     Row Name 02/12/20 1134          Pain Assessment    Additional Documentation  Pain Scale: Numbers Pre/Post-Treatment (Group)  -MS     Row Name 02/12/20 1134          Pain Scale: Numbers Pre/Post-Treatment    Pain Scale: Numbers, Pretreatment  0/10 - no pain  -MS     Pain Scale: Numbers, Post-Treatment  0/10 - no pain  -MS     Row Name 02/12/20 1134          Plan of Care Review    Plan of Care Reviewed With  patient  -MS     Row Name 02/12/20 1134          Physical Therapy Clinical Impression    Criteria for Skilled Interventions Met (PT Clinical Impression)  treatment indicated  -MS     Rehab Potential (PT Clinical Summary)  good, to achieve stated therapy goals  -MS     Row Name 02/12/20 1134          Positioning and Restraints    Pre-Treatment Position  in bed  -MS     Post Treatment Position  bed  -MS     In Bed  notified nsg;supine;call light within reach;encouraged to call for assist;exit alarm on;SCD pump applied;with other staff All lines intact.   -MS       User Key  (r) = Recorded By, (t) = Taken By, (c) = Cosigned By    Initials Name Provider Type    Sushant Yangjamin SCHMIDT, PT Physical Therapist        Outcome Measures     Row Name 02/12/20 1138          How much help from another person do you currently need...    Turning from your back to your side while in flat bed without using bedrails?  3  -MS     Moving from lying on back to sitting on the side of a flat bed without bedrails?  3  -MS     Moving to and from a bed to a chair (including a wheelchair)?  3  -MS     Standing up from a chair using your arms (e.g., wheelchair, bedside chair)?  3  -MS     Climbing 3-5  steps with a railing?  3  -MS     To walk in hospital room?  3  -MS     AM-PAC 6 Clicks Score (PT)  18  -MS     Row Name 02/12/20 1135          Functional Assessment    Outcome Measure Options  AM-PAC 6 Clicks Basic Mobility (PT)  -MS       User Key  (r) = Recorded By, (t) = Taken By, (c) = Cosigned By    Initials Name Provider Type    Sergey Yang, PT Physical Therapist          PT Recommendation and Plan  Planned Therapy Interventions (PT Eval): balance training, bed mobility training, gait training, home exercise program, patient/family education, postural re-education, transfer training, strengthening  Outcome Summary/Treatment Plan (PT)  Anticipated Discharge Disposition (PT): home with assist, home with home health  Plan of Care Reviewed With: patient  Outcome Summary: Pt. admitted to the hospital with SOA, increased cough, and diagnosed with Rhinovirus.  Pt. reports that prior to admission she was independent with functional mobility and use of RWx for ambulation.  Pt. currently presents with decreased strength, decreased balance, and decreased tolerance to functional activity. Pt. will benefit from skilled inpt. P.T. to address her functional deficits and to assist pt. in regaining her maximum level of independence with functional mobility.     Time Calculation:   PT Charges     Row Name 02/12/20 1138             Time Calculation    Start Time  1055  -MS      Stop Time  1108  -MS      Time Calculation (min)  13 min  -MS      PT Received On  02/12/20  -MS      PT - Next Appointment  02/13/20  -MS      PT Goal Re-Cert Due Date  02/17/20  -MS         Time Calculation- PT    Total Timed Code Minutes- PT  11 minute(s)  -MS        User Key  (r) = Recorded By, (t) = Taken By, (c) = Cosigned By    Initials Name Provider Type    Sergey Yang, PT Physical Therapist        Therapy Charges for Today     Code Description Service Date Service Provider Modifiers Qty    05900503669 HC PT EVAL LOW COMPLEXITY  1 2/12/2020 Sergey Messer, PT GP 1    40886777952 HC PT THER PROC EA 15 MIN 2/12/2020 Sergey Messer, PT GP 1          PT G-Codes  Outcome Measure Options: AM-PAC 6 Clicks Basic Mobility (PT)  AM-PAC 6 Clicks Score (PT): 18    Sergey Messer, PT  2/12/2020

## 2020-02-12 NOTE — PLAN OF CARE
Prn breathing tx obtain x 1 with some relief stated, occasional moist nonproductive cough noted, placed in isolation for Rhinovirus, VS'S, NSR on monitor, eyes closed at long interval, resting quietly

## 2020-02-12 NOTE — H&P
Patient Name:  Caitlyn Longoria  YOB: 1934  MRN:  [id number]  Admit Date:  2/11/2020  Patient Care Team:  Zenaida Suarez MD as PCP - General (Internal Medicine)  Pao Levi [facility] as Pharmacist  Alexander Valiente [facility] as Pharmacist (Pharmacy)      Subjective   History Present Illness     Chief Complaint   Patient presents with   • Shortness of Breath     History of Present Illness   Ms. Longoria is a 85 y.o. non smoker with a history of type 2 diabetes mellitus non-insulin-dependent, proximal atrial fibrillation, coronary artery disease, hypertension, chronic kidney disease stage III, chronic combined systolic and diastolic congestive heart failure, and asthma that presents to [facility] complaining of shortness of breath.  Patient states that her shortness of breath began 2 days ago and has been worsening since Saturday.  She also complains of a dry, hacking nonproductive cough.  She was here Monday for a pacemaker replacement due to a battery malfunction.  Respiratory viral panel was collected in the emergency department which showed human rhinovirus.  Chest x-ray shows no acute processes.  She denies a history of COPD.  She is currently on 2 L of oxygen satting at 95%.  Labs show a glucose of 129, pro time 18.4, INR 1.57, white blood cell count of 10.41.  On assessment she denies chest pain, headaches, nausea, vomiting, diarrhea, constipation, fever, and chills.    Review of Systems   Constitutional: Negative for chills and fever.   HENT: Positive for rhinorrhea. Negative for congestion.    Eyes: Positive for visual disturbance.        C/o blurry vision   Respiratory: Positive for cough and shortness of breath.    Cardiovascular: Positive for leg swelling. Negative for chest pain.   Gastrointestinal: Negative for abdominal distention, abdominal pain, constipation, nausea and vomiting.   Endocrine: Negative for cold intolerance and heat intolerance.   Genitourinary:  Negative for difficulty urinating, dysuria and frequency.   Musculoskeletal: Negative for gait problem and joint swelling.   Skin: Negative for rash and wound.   Neurological: Negative for dizziness and light-headedness.   Psychiatric/Behavioral: Negative for sleep disturbance and suicidal ideas.        Personal History     Past Medical History:   Diagnosis Date   • Aortic calcification (CMS/HCC)     mild, on echo 12/17/2017   • Aortic regurgitation     Trace   • Asthma    • CAD (coronary artery disease)    • Chronic combined systolic and diastolic congestive heart failure (CMS/[facility])    • CKD (chronic kidney disease) stage 3, GFR 30-59 ml/min (CMS/[facility])    • Coronary artery disease involving native coronary artery of native heart with angina pectoris (CMS/[facility])    • DM type 2 (diabetes mellitus, type 2) (CMS/[facility])    • Hypertension    • Mild mitral regurgitation    • Mitral annular calcification     12/8/2017- echo, moderate   • PAF (paroxysmal atrial fibrillation) (CMS/[facility])    • SSS (sick sinus syndrome) (CMS/[facility])    • Tricuspid regurgitation     Trace     Past Surgical History:   Procedure Laterality Date   • CARDIAC CATHETERIZATION     • CARDIAC ELECTROPHYSIOLOGY PROCEDURE N/A 2/7/2020    Procedure: PPM generator change - dual  medtronic;  Surgeon: Kiel Field MD;  Location: [facility] INVASIVE LOCATION;  Service: Cardiology;  Laterality: N/A;   • CHOLECYSTECTOMY     • CORONARY STENT PLACEMENT     • HERNIA REPAIR      hital hernia   • HYSTERECTOMY     • PACEMAKER IMPLANTATION     • REPLACEMENT TOTAL KNEE Bilateral      Family History   Problem Relation Age of Onset   • Heart disease Mother    • Hypertension Mother    • Heart disease Father    • Hypertension Father    • Hypertension Brother    • Heart disease Brother    • Diabetes Niece      Social History     Tobacco Use   • Smoking status: Never Smoker   • Smokeless tobacco: Never Used   • Tobacco comment: caffeine use   Substance Use Topics   • Alcohol  use: Not Currently   • Drug use: No     No current facility-administered medications on file prior to encounter.      Current Outpatient Medications on File Prior to Encounter   Medication Sig Dispense Refill   • acetaminophen (TYLENOL) 650 MG 8 hr tablet Take 650 mg by mouth 2 (Two) Times a Day.     • Acetylcysteine 500 MG capsule Take 1,000 mg by mouth Daily With Dinner.     • albuterol (PROVENTIL) (2.5 MG/3ML) 0.083% nebulizer solution Take 2.5 mg by nebulization Every 4 (Four) Hours As Needed for Wheezing.     • albuterol sulfate  (90 Base) MCG/ACT inhaler Inhale 2 puffs Every 4 (Four) Hours As Needed for Wheezing. 1 inhaler 3   • amoxicillin (AMOXIL) 500 MG capsule Take 1,000 mg by mouth 2 (Two) Times a Day.     • aspirin 81 MG tablet Take 81 mg by mouth Daily.     • Calcium Carb-Cholecalciferol (CALCIUM 500+D) 500-200 MG-UNIT tablet Take 1 tablet by mouth 2 (Two) Times a Day.     • carvedilol (COREG) 3.125 MG tablet Take 1 tablet by mouth 2 (Two) Times a Day. 180 tablet 3   • cefadroxil (DURICEF) 500 MG capsule Take 500 mg by mouth 2 (Two) Times a Day. Taken consistently for MSSA infection     • cetirizine (zyrTEC) 10 MG tablet Take 10 mg by mouth Daily.     • Dupilumab (DUPIXENT) 300 MG/2ML solution prefilled syringe Inject  under the skin into the appropriate area as directed.     • fluticasone (FLONASE) 50 MCG/ACT nasal spray 1 spray into the nostril(s) as directed by provider 2 (Two) Times a Day.     • Fluticasone-Umeclidin-Vilant (TRELEGY ELLIPTA IN) Inhale. As directed     • furosemide (LASIX) 20 MG tablet Take 20 mg by mouth Every Morning.     • glipiZIDE (GLUCOTROL) 5 MG tablet Take 2.5 mg by mouth Every Morning.     • ketoconazole (NIZORAL) 2 % cream Apply  topically to the appropriate area as directed Daily.     • loperamide (IMODIUM) 2 MG capsule Take 2 mg by mouth Daily. After first bowel movement     • losartan (COZAAR) 25 MG tablet TAKE ONE TABLET BY MOUTH DAILY 90 tablet 1   • Menthol,  Topical Analgesic, (BIOFREEZE ROLL-ON EX) Apply  topically.     • montelukast (SINGULAIR) 10 MG tablet Take 10 mg by mouth Every Night.     • Omega-3 Fatty Acids (FISH OIL) 1000 MG capsule capsule Take 1,000 mg by mouth 2 (Two) Times a Day With Meals.     • oxazepam (SERAX) 10 MG capsule Take 1 capsule by mouth Every Night. 5 capsule 0   • oxybutynin XL (DITROPAN-XL) 10 MG 24 hr tablet Take 10 mg by mouth every night at bedtime.     • oxyCODONE-acetaminophen (PERCOCET) 5-325 MG per tablet Take 1-2 tablets by mouth Every 4 (Four) Hours As Needed (Pain). 10 tablet 0   • polyethyl glycol-propyl glycol (LUBRICATING EYE DROPS) 0.4-0.3 % solution ophthalmic solution Administer 1 drop to both eyes Every 1 (One) Hour As Needed.     • rosuvastatin (CRESTOR) 20 MG tablet Take 20 mg by mouth Every Night.     • Umeclidinium Bromide (INCRUSE ELLIPTA) 62.5 MCG/INH aerosol powder  Inhale 1 each Daily.     • warfarin (COUMADIN) 4 MG tablet Take one tablet by mouth daily or as directed. 90 tablet 0     Allergies   Allergen Reactions   • Accupril [Quinapril Hcl] Delirium     Swelling, HA,, confusion and constipation per pt   • Ahist [Chlorpheniramine] Nausea Only, Other (See Comments) and Dizziness     Headache, Blurred vision   • Chlorcyclizine Unknown (See Comments)   • Clarithromycin Nausea Only     Other reaction(s): Headache, Depression, Flushed   • Diclofenac Sodium Unknown (See Comments)   • Diphenhydramine      Benadryl   • Esomeprazole GI Intolerance     Nexium. Stomach issues   • Ibuprofen Other (See Comments)     Does not recall    • Levalbuterol Swelling     Xopenex   • Levocetirizine Other (See Comments)     Xyzal. Diarrhea, Stomach cramps   • Lipitor [Atorvastatin] Other (See Comments)     Muscle pain and weakness, dark urine   • Lodine [Etodolac]    • Omeprazole Nausea Only     Prilosec. Headache   • Pravachol [Pravastatin] Nausea Only and Other (See Comments)     Bloated, Constipation, Headaches   • Quinapril Swelling  and Confusion     Accupril. Headaches, constipation   • Sulfa Antibiotics    • Sulindac Myalgia     Other reaction(s): Headache, joint pain, bruising   • Valdecoxib Irritability   • Prednisone Rash       Objective    Objective     Vital Signs  Temp:  [98.9 °F (37.2 °C)] 98.9 °F (37.2 °C)  Heart Rate:  [68-83] 80  Resp:  [18-24] 19  BP: (126-169)/(86-98) 142/93  SpO2:  [94 %-97 %] 94 %  on  Flow (L/min):  [2-3] 3;   Device (Oxygen Therapy): nasal cannula  Body mass index is 39.15 kg/m².    Physical Exam   Constitutional: She is oriented to person, place, and time. She appears well-developed and well-nourished.   HENT:   Head: Normocephalic and atraumatic.   Eyes: Conjunctivae and EOM are normal.   Neck: Normal range of motion. Neck supple. No JVD present.   Cardiovascular: Normal rate, regular rhythm and normal heart sounds. Exam reveals no friction rub.   No murmur heard.  Pulmonary/Chest: No stridor. She has no rales.   Mild respiratory distress   Abdominal: Soft. Bowel sounds are normal. She exhibits no distension. There is no tenderness. There is no guarding.   Musculoskeletal: Normal range of motion. She exhibits edema and tenderness. She exhibits no deformity.   Left chest area. Incision from PPM placement   Neurological: She is alert and oriented to person, place, and time.   Skin: Skin is warm and dry. Capillary refill takes less than 2 seconds. Bruising noted.   Bruising to upper left chest from PPM incision   Psychiatric: She has a normal mood and affect. Her behavior is normal. Judgment and thought content normal.       Results Review:  I reviewed the patient's new clinical results.  Discussed with ED provider.    Lab Results (last 24 hours)     Procedure Component Value Units Date/Time    Respiratory Panel, PCR - Swab, Nasopharynx [[id number]]  (Abnormal) Collected:  02/11/20 1744    Specimen:  Swab from Nasopharynx Updated:  02/11/20 0291     ADENOVIRUS, PCR Not Detected     Coronavirus 229E Not Detected       Coronavirus HKU1 Not Detected     Coronavirus NL63 Not Detected     Coronavirus OC43 Not Detected     Human Metapneumovirus Not Detected     Human Rhinovirus/Enterovirus Detected     Influenza B PCR Not Detected     Parainfluenza Virus 1 Not Detected     Parainfluenza Virus 2 Not Detected     Parainfluenza Virus 3 Not Detected     Parainfluenza Virus 4 Not Detected     Bordetella pertussis pcr Not Detected     Influenza A H1 2009 PCR Not Detected     Chlamydophila pneumoniae PCR Not Detected     Mycoplasma pneumo by PCR Not Detected     Influenza A PCR Not Detected     Influenza A H3 Not Detected     Influenza A H1 Not Detected     RSV, PCR Not Detected     Bordetella parapertussis PCR Not Detected    CBC & Differential [[id number]] Collected:  02/11/20 1748    Specimen:  Blood Updated:  02/11/20 1808    Narrative:       The following orders were created for panel order CBC & Differential.  Procedure                               Abnormality         Status                     ---------                               -----------         ------                     CBC Auto Differential[[id number]]        Abnormal            Final result                 Please view results for these tests on the individual orders.    Comprehensive Metabolic Panel [[id number]]  (Abnormal) Collected:  02/11/20 1748    Specimen:  Blood Updated:  02/11/20 1820     Glucose 129 mg/dL      BUN 21 mg/dL      Creatinine 0.92 mg/dL      Sodium 141 mmol/L      Potassium 3.5 mmol/L      Chloride 104 mmol/L      CO2 26.6 mmol/L      Calcium 9.4 mg/dL      Total Protein 6.5 g/dL      Albumin 4.30 g/dL      ALT (SGPT) 18 U/L      AST (SGOT) 25 U/L      Alkaline Phosphatase 71 U/L      Total Bilirubin 0.7 mg/dL      eGFR Non African Amer 58 mL/min/1.73      Globulin 2.2 gm/dL      A/G Ratio 2.0 g/dL      BUN/Creatinine Ratio 22.8     Anion Gap 10.4 mmol/L     Narrative:       GFR Normal >60  Chronic Kidney Disease <60  Kidney Failure <15      BNP  [[id number]]  (Normal) Collected:  02/11/20 1748    Specimen:  Blood Updated:  02/11/20 1822     proBNP 631.6 pg/mL     Narrative:       Among patients with dyspnea, NT-proBNP is highly sensitive for the detection of acute congestive heart failure. In addition NT-proBNP of <300 pg/ml effectively rules out acute congestive heart failure with 99% negative predictive value.    Results may be falsely decreased if patient taking Biotin.      Troponin [[id number]]  (Normal) Collected:  02/11/20 1748    Specimen:  Blood Updated:  02/11/20 1822     Troponin T 0.014 ng/mL     Narrative:       Troponin T Reference Range:  <= 0.03 ng/mL-   Negative for AMI  >0.03 ng/mL-     Abnormal for myocardial necrosis.  Clinicians would have to utilize clinical acumen, EKG, Troponin and serial changes to determine if it is an Acute Myocardial Infarction or myocardial injury due to an underlying chronic condition.       Results may be falsely decreased if patient taking Biotin.      CBC Auto Differential [[id number]]  (Abnormal) Collected:  02/11/20 1748    Specimen:  Blood Updated:  02/11/20 1808     WBC 10.41 10*3/mm3      RBC 4.06 10*6/mm3      Hemoglobin 12.7 g/dL      Hematocrit 39.8 %      MCV 98.0 fL      MCH 31.3 pg      MCHC 31.9 g/dL      RDW 13.1 %      RDW-SD 46.5 fl      MPV 9.7 fL      Platelets 153 10*3/mm3     Protime-INR [[id number]]  (Abnormal) Collected:  02/11/20 1748    Specimen:  Blood Updated:  02/11/20 1819     Protime 18.4 Seconds      INR 1.57    Manual Differential [[id number]]  (Abnormal) Collected:  02/11/20 1748    Specimen:  Blood Updated:  02/11/20 1839     Neutrophil % 36.0 %      Lymphocyte % 61.0 %      Monocyte % 2.0 %      Eosinophil % 1.0 %      Neutrophils Absolute 3.75 10*3/mm3      Lymphocytes Absolute 6.35 10*3/mm3      Monocytes Absolute 0.21 10*3/mm3      Eosinophils Absolute 0.10 10*3/mm3      RBC Morphology Normal     WBC Morphology Normal     Platelet Morphology Normal          Imaging Results  (Last 24 Hours)     Procedure Component Value Units Date/Time    XR Chest 2 View [[id number]] Collected:  02/11/20 1903     Updated:  02/11/20 1909    Narrative:       XR CHEST 2 VW-     HISTORY: Female who is 85 years-old,  short of breath     TECHNIQUE: Frontal and lateral views of the chest     COMPARISON: 09/16/2019     FINDINGS: The heart appears enlarged. Aorta is tortuous, calcified.  Left-sided pacemaker and cardiac leads. Pulmonary vasculature is  unremarkable. Likely atelectasis in the left lower lung. No focal  pulmonary consolidation, pleural effusion, or pneumothorax. Left  hemidiaphragm remains elevated. No acute osseous process.       Impression:       Likely atelectasis in the left lower lung. Cardiomegaly.  Tortuous aorta. Follow-up as clinically indicated.     This report was finalized on 2/11/2020 7:05 PM by Dr. Mitul Hudson M.D.             Results for orders placed during the hospital encounter of 05/22/18   Adult Transthoracic Echo Complete W/ Cont if Necessary Per Protocol    Narrative · Left ventricular systolic function is moderately decreased. Calculated   EF = 49%. Estimated EF was in disagreement with the calculated EF.   Estimated EF appears to be in the range of 36 - 40%. Normal left   ventricular wall thickness noted.  · The following segments are akinetic: mid inferior, apical septal, apical   inferior, apical lateral and apex. The following segments are hypokinetic:   mid inferoseptal and mid inferolateral.  · The left ventricular cavity is mildly dilated.  · Left ventricular diastolic dysfunction is noted (grade II w/high LAP)   consistent with pseudonormalization.  · Electronic lead present in the ventricle.  · Left atrial volume is moderately increased.  · There is moderate calcification of the aortic valve.  · Moderate MAC is present.  · Mild-to-moderate mitral valve regurgitation is present.  · Mild tricuspid valve regurgitation is present. Estimated right   ventricular  systolic pressure from tricuspid regurgitation is normal (<35   mmHg).          ECG 12 Lead   Final Result   HEART RATE= 80  bpm   RR Interval= 751  ms   IL Interval= 69  ms   P Horizontal Axis=   deg   P Front Axis= 34  deg   QRSD Interval= 179  ms   QT Interval= 403  ms   QRS Axis= -67  deg   T Wave Axis= 108  deg   - ABNORMAL ECG -   A-V dual-paced rhythm with some inhibition   No change from prior tracing   Electronically Signed By: Gabino BarrosEILEEN) ([facility]) 11-Feb-2020 18:12:13   Date and Time of Study: 2020-02-11 17:33:29           Assessment/Plan     Active Hospital Problems    Diagnosis  POA   • Rhinovirus [B34.8]  Yes   • Asthma with acute exacerbation [J45.901]  Unknown   • Acute respiratory failure with hypoxia (CMS/[facility]) [J96.01]  Yes   • Current use of long term anticoagulation [Z79.01]  Not Applicable   • Chronic combined systolic and diastolic congestive heart failure (CMS/[facility]) [I50.42]  Yes   • HTN (hypertension) [I10]  Yes   • CKD (chronic kidney disease) stage 3, GFR 30-59 ml/min (CMS/[facility]) [N18.3]  Yes   • DM type 2 (diabetes mellitus, type 2) (CMS/[facility]) [E11.9]  Yes   • PAF (paroxysmal atrial fibrillation) (CMS/[facility]) [I48.0]  Yes   • Coronary artery disease involving native coronary artery of native heart with angina pectoris (CMS/[facility]) [I25.119]  Yes      Resolved Hospital Problems   No resolved problems to display.     Rhinovirus  -Patient has a history of asthma. Significant wheezing on presentation to ER. Asthma exacerbated by the rhinovirus. She takes Dupixent for her asthma.   -Given duonebs and IV  Steroids in ER. Will continue.  -Supportive care  -Oxygen as needed  -Antitussives as needed    Acute respiratory failure with hypoxia  -Secondary to rhinovirus  -Currently on 2L O2, with saturations at 95%  -Wean oxygen   -Breathing treatments as needed    Type 2 DM  -Accuchecks ac/hs  -Correctional insulin  -Hold oral sulfonylureas for now  -HgA1c    PAF  -NSR now, rate controlled  -Currently on  coumadin  -Continue home regimen    Hx of CKD stage 3  -Stable  -AM Labs    Hx of combined CHF  -No s/s of fluid overload, stable  -Daily weights  -Monitor    I discussed the patient's findings and my recommendations with the patient, family, and Dr. Joseph.    VTE Prophylaxis - SCDs  Code Status - Full       KRISTIN Martínez  [facility]  02/11/20  8:39 PM

## 2020-02-12 NOTE — PLAN OF CARE
Problem: Patient Care Overview  Goal: Plan of Care Review  Outcome: Ongoing (interventions implemented as appropriate)  Flowsheets (Taken 2/12/2020 1645)  Progress: improving  Plan of Care Reviewed With: patient  Outcome Summary: Patient alert and oriented. Denies pain. Ambulated around the nursing unit with PT. On oxygen via nc. No s/s of acute distress. Scd's in place. Will continue to monitor.  Goal: Individualization and Mutuality  Outcome: Ongoing (interventions implemented as appropriate)  Goal: Discharge Needs Assessment  Outcome: Ongoing (interventions implemented as appropriate)  Goal: Interprofessional Rounds/Family Conf  Outcome: Ongoing (interventions implemented as appropriate)     Problem: Fall Risk (Adult)  Goal: Identify Related Risk Factors and Signs and Symptoms  Outcome: Ongoing (interventions implemented as appropriate)  Flowsheets (Taken 2/12/2020 1645)  Related Risk Factors (Fall Risk): age-related changes; environment unfamiliar  Signs and Symptoms (Fall Risk): presence of risk factors  Goal: Absence of Fall  Outcome: Ongoing (interventions implemented as appropriate)  Flowsheets (Taken 2/12/2020 1645)  Absence of Fall: making progress toward outcome     Problem: ARDS (Acute Resp Distress Syndrome) (Adult)  Goal: Signs and Symptoms of Listed Potential Problems Will be Absent, Minimized or Managed (ARDS)  Outcome: Ongoing (interventions implemented as appropriate)

## 2020-02-12 NOTE — PROGRESS NOTES
Discharge Planning Assessment  [facility]     Patient Name: Caitlyn Longoria  MRN: [id number]  Today's Date: 2/12/2020    Admit Date: 2/11/2020    Discharge Needs Assessment     Row Name 02/12/20 1617       Living Environment    Lives With  alone    Current Living Arrangements  independent/assisted living facility [facility]    Primary Care Provided by  self    Provides Primary Care For  no one    Family Caregiver if Needed  sibling(s)    Family Caregiver Names  brother and sister in law, Trae and Tawnya Longoria, [phone]    Quality of Family Relationships  helpful;involved;supportive    Able to Return to Prior Arrangements  yes       Resource/Environmental Concerns    Resource/Environmental Concerns  none       Transition Planning    Patient/Family Anticipates Transition to  home    Transportation Anticipated  family or friend will provide       Discharge Needs Assessment    Concerns to be Addressed  discharge planning    Equipment Currently Used at Home  bipap/cpap;nebulizer;rollator;walker, rolling    Discharge Coordination/Progress  Return to [facility]    Row Name 02/12/20 1408       Living Environment    Lives With  alone    Current Living Arrangements  independent/assisted living facility [facility]    Primary Care Provided by  self    Provides Primary Care For  no one        Discharge Plan     Row Name 02/12/20 1620       Plan    Plan  Return to [facility], follow for O2 need    Provided post acute provider list?  N/A    N/A Provider List Comment  No skilled needs identified, pt declines HH    Patient/Family in Agreement with Plan  yes    Plan Comments  [facility] [place] [facility] notice checked. CCP met with pt to discuss d/c planning. Facesheet verified. CCP role explained. Pt resides alone in independent living at [facility], uses rollator/walker as needed, bipap and nebulizer. Pt denies past home health and past sub-acute rehab. Pt confirms pharmacy is [facility], and is uncertain of d/c needs at  this time. Pt denies need for HH. If DME needed at d/c, pt prefers either [facility] or [facility]. CCP to follow to assist should d/c needs arise. Jodee Flynn LCSW        Destination      Coordination has not been started for this encounter.      Durable Medical Equipment      Coordination has not been started for this encounter.      Dialysis/Infusion      Coordination has not been started for this encounter.      Home Medical Care      Coordination has not been started for this encounter.      Therapy      Coordination has not been started for this encounter.      Community Resources      Coordination has not been started for this encounter.          Demographic Summary     Row Name 02/12/20 1617       General Information    Admission Type  inpatient    Arrived From  home    Required Notices Provided  Observation Status Notice    Referral Source  admission list    Reason for Consult  discharge planning    Preferred Language  English    Row Name 02/12/20 1408       General Information    Admission Type  observation    Arrived From  home    Required Notices Provided  Observation Status Notice    Referral Source  admission list    Reason for Consult  discharge planning    Preferred Language  English        Functional Status     Row Name 02/12/20 1617       Functional Status    Usual Activity Tolerance  good    Current Activity Tolerance  moderate       Functional Status, IADL    Medications  independent    Meal Preparation  independent    Housekeeping  independent    Laundry  independent    Shopping  independent       Mental Status Summary    Recent Changes in Mental Status/Cognitive Functioning  no changes    Row Name 02/12/20 1408       Functional Status    Usual Activity Tolerance  good    Current Activity Tolerance  moderate       Functional Status, IADL    Medications  independent    Meal Preparation  independent    Housekeeping  independent    Laundry  independent    Shopping  independent       Mental Status  Summary    Recent Changes in Mental Status/Cognitive Functioning  no changes        Psychosocial    No documentation.       Abuse/Neglect    No documentation.       Legal    No documentation.       Substance Abuse    No documentation.       Patient Forms    No documentation.           Radha Flynn LCSW

## 2020-02-13 ENCOUNTER — APPOINTMENT (OUTPATIENT)
Dept: SPEECH THERAPY | Facility: HOSPITAL | Age: 85
End: 2020-02-13

## 2020-02-13 VITALS
BODY MASS INDEX: 39.02 KG/M2 | DIASTOLIC BLOOD PRESSURE: 90 MMHG | SYSTOLIC BLOOD PRESSURE: 119 MMHG | RESPIRATION RATE: 16 BRPM | OXYGEN SATURATION: 95 % | TEMPERATURE: 97.8 F | HEIGHT: 63 IN | HEART RATE: 81 BPM | WEIGHT: 220.2 LBS

## 2020-02-13 LAB
GLUCOSE BLDC GLUCOMTR-MCNC: 202 MG/DL (ref 70–130)
GLUCOSE BLDC GLUCOMTR-MCNC: 270 MG/DL (ref 70–130)
INR PPP: 2.28 (ref 0.9–1.1)
PROTHROMBIN TIME: 24.8 SECONDS (ref 11.7–14.2)

## 2020-02-13 PROCEDURE — 63710000001 INSULIN LISPRO (HUMAN) PER 5 UNITS: Performed by: NURSE PRACTITIONER

## 2020-02-13 PROCEDURE — 94799 UNLISTED PULMONARY SVC/PX: CPT

## 2020-02-13 PROCEDURE — 25010000002 METHYLPREDNISOLONE PER 40 MG: Performed by: INTERNAL MEDICINE

## 2020-02-13 PROCEDURE — 94618 PULMONARY STRESS TESTING: CPT

## 2020-02-13 PROCEDURE — 85610 PROTHROMBIN TIME: CPT | Performed by: INTERNAL MEDICINE

## 2020-02-13 PROCEDURE — 82962 GLUCOSE BLOOD TEST: CPT

## 2020-02-13 RX ORDER — WARFARIN SODIUM 2 MG/1
2 TABLET ORAL
Status: DISCONTINUED | OUTPATIENT
Start: 2020-02-13 | End: 2020-02-13 | Stop reason: HOSPADM

## 2020-02-13 RX ORDER — WARFARIN SODIUM 4 MG/1
4 TABLET ORAL
Status: DISCONTINUED | OUTPATIENT
Start: 2020-02-14 | End: 2020-02-13 | Stop reason: HOSPADM

## 2020-02-13 RX ORDER — WARFARIN SODIUM 4 MG/1
2 TABLET ORAL NIGHTLY
Refills: 0
Start: 2020-02-13 | End: 2020-04-03 | Stop reason: SDUPTHER

## 2020-02-13 RX ADMIN — CEPHALEXIN 500 MG: 500 CAPSULE ORAL at 08:49

## 2020-02-13 RX ADMIN — METHYLPREDNISOLONE SODIUM SUCCINATE 40 MG: 40 INJECTION, POWDER, FOR SOLUTION INTRAMUSCULAR; INTRAVENOUS at 10:27

## 2020-02-13 RX ADMIN — INSULIN LISPRO 6 UNITS: 100 INJECTION, SOLUTION INTRAVENOUS; SUBCUTANEOUS at 12:28

## 2020-02-13 RX ADMIN — FLUTICASONE PROPIONATE 1 SPRAY: 50 SPRAY, METERED NASAL at 08:49

## 2020-02-13 RX ADMIN — SODIUM CHLORIDE, PRESERVATIVE FREE 10 ML: 5 INJECTION INTRAVENOUS at 08:50

## 2020-02-13 RX ADMIN — IPRATROPIUM BROMIDE AND ALBUTEROL SULFATE 3 ML: 2.5; .5 SOLUTION RESPIRATORY (INHALATION) at 11:30

## 2020-02-13 RX ADMIN — FUROSEMIDE 20 MG: 20 TABLET ORAL at 06:06

## 2020-02-13 RX ADMIN — CARVEDILOL 3.12 MG: 3.12 TABLET, FILM COATED ORAL at 08:49

## 2020-02-13 RX ADMIN — CETIRIZINE HYDROCHLORIDE 10 MG: 10 TABLET, FILM COATED ORAL at 08:49

## 2020-02-13 RX ADMIN — LOSARTAN POTASSIUM 25 MG: 25 TABLET, FILM COATED ORAL at 08:49

## 2020-02-13 RX ADMIN — IPRATROPIUM BROMIDE AND ALBUTEROL SULFATE 3 ML: 2.5; .5 SOLUTION RESPIRATORY (INHALATION) at 08:15

## 2020-02-13 RX ADMIN — ASPIRIN 81 MG: 81 TABLET, COATED ORAL at 08:49

## 2020-02-13 RX ADMIN — INSULIN LISPRO 4 UNITS: 100 INJECTION, SOLUTION INTRAVENOUS; SUBCUTANEOUS at 08:49

## 2020-02-13 NOTE — DISCHARGE PLACEMENT REQUEST
"Caitlyn Longoria (85 y.o. Female)     Date of Birth Social Security Number Address Home Phone MRN    [id number]  [id number]  [address]  [place] [address] [phone] [id number]    Sikhism Marital Status          Cheondoism Single       Admission Date Admission Type Admitting Provider Attending Provider Department, Room/Bed    2/11/20 Emergency Jv Joseph MD Hayden, Juliana, MD [address], [address]1    Discharge Date Discharge Disposition Discharge Destination         Home or Self Care              Attending Provider:  Christie Ray MD    Allergies:  Accupril [Quinapril Hcl], Ahist [Chlorpheniramine], Chlorcyclizine, Clarithromycin, Diclofenac Sodium, Diphenhydramine, Esomeprazole, Ibuprofen, Levalbuterol, Levocetirizine, Lipitor [Atorvastatin], Lodine [Etodolac], Omeprazole, Pravachol [Pravastatin], Quinapril, Sulfa Antibiotics, Sulindac, Valdecoxib, Prednisone    Isolation:  Droplet   Infection:  Rhinovirus  (02/11/20)   Code Status:  CPR    Ht:  160 cm (63\")   Wt:  99.9 kg (220 lb 3.2 oz)    Admission Cmt:  None   Principal Problem:  Acute respiratory failure with hypoxia (CMS/HCC) [J96.01]                 Active Insurance as of 2/11/2020     Primary Coverage     Payor Plan Insurance Group Employer/Plan Group    MEDICARE MEDICARE A & B      Payor Plan Address Payor Plan Phone Number Payor Plan Fax Number Effective Dates    [address] [phone]  10/1/1999 - None Entered    [facility] [id number]       Subscriber Name Subscriber Birth Date Member ID       CAITLYN LONGORIA 1934 [id number]           Secondary Coverage     Payor Plan Insurance Group Employer/Plan Group    AARP MC SUP AARP HEALTH CARE OPTIONS PLAN F     Payor Plan Address Payor Plan Phone Number Payor Plan Fax Number Effective Dates    [facility] [phone]  1/1/2015 - None Entered    [address]       [facility] [address]       Subscriber Name Subscriber Birth Date Member " ID       DELFIN LONGORIA 1934 [id number]                 Emergency Contacts      (Rel.) Home Phone Work Phone Mobile Phone    Inez Longoria (Sister) [phone] -- --    Trae Longoria (Brother) [phone] -- [phone]    Zoey Adair (Relative) [phone] -- [phone]

## 2020-02-13 NOTE — SIGNIFICANT NOTE
"   02/13/20 1131   Rehab Treatment   Discipline physical therapist   Reason Treatment Not Performed other (see comments)  (Observed pt. perform ambulation with R.T. (Walking oximetry) 250 feet independently this AM.  Spoke with pt. and she reports feeling \"good\" and ready to go home.  Plans for d/c this date. Will sign off.)     "

## 2020-02-13 NOTE — PROGRESS NOTES
Pharmacy Consult: Warfarin Dosing/ Monitoring    Caitlyn Longoria is a 85 y.o. female, estimated creatinine clearance is 49.3 mL/min (by C-G formula based on SCr of 0.94 mg/dL). weighing 99.9 kg (220 lb 3.2 oz).    She has a past medical history of Aortic calcification (CMS/[facility]), Aortic regurgitation, Asthma, CAD (coronary artery disease), Chronic combined systolic and diastolic congestive heart failure (CMS/[facility]), CKD (chronic kidney disease) stage 3, GFR 30-59 ml/min (CMS/[facility]), Coronary artery disease involving native coronary artery of native heart with angina pectoris (CMS/[facility]), DM type 2 (diabetes mellitus, type 2) (CMS/[facility]), Hypertension, Mild mitral regurgitation, Mitral annular calcification, PAF (paroxysmal atrial fibrillation) (CMS/[facility]), SSS (sick sinus syndrome) (CMS/[facility]), and Tricuspid regurgitation.    Social History     Tobacco Use    Smoking status: Never Smoker    Smokeless tobacco: Never Used    Tobacco comment: caffeine use   Substance Use Topics    Alcohol use: Not Currently    Drug use: No       Results from last 7 days   Lab Units 02/13/20  0641 02/12/20  0647 02/11/20  1748 02/07/20  0850 02/07/20  0849   INR  2.28* 1.64* 1.57* 1.4* 1.4*   HEMOGLOBIN g/dL  --  13.1 12.7  --   --    HEMATOCRIT %  --  40.4 39.8  --   --    PLATELETS 10*3/mm3  --  156 153  --   --      Results from last 7 days   Lab Units 02/12/20  0647 02/11/20  1748   SODIUM mmol/L 141 141   POTASSIUM mmol/L 4.2 3.5   CHLORIDE mmol/L 102 104   CO2 mmol/L 23.2 26.6   BUN mg/dL 21 21   CREATININE mg/dL 0.94 0.92   CALCIUM mg/dL 9.2 9.4   BILIRUBIN mg/dL  --  0.7   ALK PHOS U/L  --  71   ALT (SGPT) U/L  --  18   AST (SGOT) U/L  --  25   GLUCOSE mg/dL 253* 129*     Anticoagulation history: 2 mg on Tuesday and Saturday, and 4 mg on Sunday, Monday, Wednesday, Thursday, and Friday    Hospital Anticoagulation:  Consulting provider: Dr Jv Joseph  Start date: 2/11/2020  Indication: A Fib  Target INR: 2-3  Expected duration:      Bridge Therapy:                     Date 2/7 2/11 2/12 2/13         INR 1.4 1.57 1.64 2.28         Warfarin dose  3mg 4mg            Potential drug interactions: Cephalexin    Relevant nutrition status:  regular    Education complete?/ Date:  defer, pt takes warfarin PTA    Assessment/Plan:  INR trending up, no therapuetic.  Large bump today, will give 2 mg today then resume previous schedule.  Monitor INR daily.  H/H stable.  Monitor s/sx of bleeding.    Pharmacy will continue to follow until discharge or discontinuation of warfarin.   Yousuf Pulliam, ALANNA  2/13/2020

## 2020-02-13 NOTE — DISCHARGE SUMMARY
Patient Name: Caitlyn Longoria  : 1934  MRN: [id number]    Date of Admission: 2020  Date of Discharge:  2020  Primary Care Physician: Zenaida Suarez MD      Chief Complaint:   Shortness of Breath      Discharge Diagnoses     Active Hospital Problems    Diagnosis  POA   • **Acute respiratory failure with hypoxia (CMS/[facility]) [J96.01]  Yes   • Acute respiratory failure with hypoxemia (CMS/[facility]) [J96.01]  Yes   • Rhinovirus [B34.8]  Yes   • Asthma with acute exacerbation [J45.901]  Unknown   • Current use of long term anticoagulation [Z79.01]  Not Applicable   • Chronic combined systolic and diastolic congestive heart failure (CMS/[facility]) [I50.42]  Yes   • HTN (hypertension) [I10]  Yes   • CKD (chronic kidney disease) stage 3, GFR 30-59 ml/min (CMS/[facility]) [N18.3]  Yes   • DM type 2 (diabetes mellitus, type 2) (CMS/[facility]) [E11.9]  Yes   • PAF (paroxysmal atrial fibrillation) (CMS/[facility]) [I48.0]  Yes   • Coronary artery disease involving native coronary artery of native heart with angina pectoris (CMS/[facility]) [I25.119]  Yes      Resolved Hospital Problems   No resolved problems to display.        Hospital Course     Ms. Longoria is a 85 y.o. woman admitted for shortness of breath, wheezing and cough.  She she had exacerbation of asthma secondary to acute infection with rhinovirus.  She was started on steroids and mini nebs.  She has significantly improved.  She initially had acute hypoxic respiratory failure requiring oxygen supplementation.  She was on room air this morning and was able to ambulate maintaining sats 89% or more. She is anxious to go home.  Steroid dosage will be decreased.  She had some hyperglycemia secondary to the steroids.  I anticipate sugars will improve quickly now that steroid dosage has been reduced.  Similarly, I expect her lower extremity edema will decrease with the reduction in steroid dosage.  Continue her home dose of Lasix.    Coumadin dosage has been adjusted.  INR was  low yesterday and increased substantially today.  Dosage has been decreased.  Recheck INR in 3 or 4 days.    Stable condition; good prognosis    Day of Discharge     Physical Exam:  Temp:  [96.6 °F (35.9 °C)-97.8 °F (36.6 °C)] 97.8 °F (36.6 °C)  Heart Rate:  [79-86] 81  Resp:  [16-18] 16  BP: (114-157)/(73-97) 119/90  Body mass index is 39.01 kg/m².  Physical Exam   Constitutional: She appears well-developed and well-nourished.   Cardiovascular: Normal rate and regular rhythm.   Murmur heard.  Grade 3/6 systolic murmur best heard left sternal border   Pulmonary/Chest: No stridor. No respiratory distress. She has wheezes.   Breath sounds diminished.  A few crackles at the right bases but otherwise fairly clear   Abdominal: Soft. Bowel sounds are normal.   Musculoskeletal: She exhibits edema.   1+   Neurological: She is alert.   Nursing note and vitals reviewed.      Consultants     Consult Orders (all) (From admission, onward)     Start     Ordered    02/11/20 1916  [facility] (on-call MD unless specified) Details  Once     Specialty:  Hospitalist  Provider:  (Not yet assigned)    02/11/20 1915              Procedures     * Surgery not found *      Imaging Results (All)     Procedure Component Value Units Date/Time    XR Chest 2 View [[id number]] Collected:  02/11/20 1903     Updated:  02/11/20 1909    Narrative:       XR CHEST 2 VW-     HISTORY: Female who is 85 years-old,  short of breath     TECHNIQUE: Frontal and lateral views of the chest     COMPARISON: 09/16/2019     FINDINGS: The heart appears enlarged. Aorta is tortuous, calcified.  Left-sided pacemaker and cardiac leads. Pulmonary vasculature is  unremarkable. Likely atelectasis in the left lower lung. No focal  pulmonary consolidation, pleural effusion, or pneumothorax. Left  hemidiaphragm remains elevated. No acute osseous process.       Impression:       Likely atelectasis in the left lower lung. Cardiomegaly.  Tortuous aorta. Follow-up as clinically  indicated.     This report was finalized on 2/11/2020 7:05 PM by Dr. Mitul Hudson M.D.             Pertinent Labs     Results from last 7 days   Lab Units 02/12/20  0647 02/11/20  1748   WBC 10*3/mm3 10.80 10.41   HEMOGLOBIN g/dL 13.1 12.7   PLATELETS 10*3/mm3 156 153     Results from last 7 days   Lab Units 02/12/20  0647 02/11/20  1748   SODIUM mmol/L 141 141   POTASSIUM mmol/L 4.2 3.5   CHLORIDE mmol/L 102 104   CO2 mmol/L 23.2 26.6   BUN mg/dL 21 21   CREATININE mg/dL 0.94 0.92   GLUCOSE mg/dL 253* 129*   Estimated Creatinine Clearance: 49.3 mL/min (by C-G formula based on SCr of 0.94 mg/dL).  Results from last 7 days   Lab Units 02/11/20  1748   ALBUMIN g/dL 4.30   BILIRUBIN mg/dL 0.7   ALK PHOS U/L 71   AST (SGOT) U/L 25   ALT (SGPT) U/L 18     Results from last 7 days   Lab Units 02/12/20  0647 02/11/20  1748   CALCIUM mg/dL 9.2 9.4   ALBUMIN g/dL  --  4.30       Results from last 7 days   Lab Units 02/11/20  1748   TROPONIN T ng/mL 0.014   PROBNP pg/mL 631.6           Invalid input(s): LDLCALC      INR 2.3 today.  1.6 on 2/12/2020  A1c 6.5  Respiratory viral panel positive for rhinovirus/enterovirus    Test Results Pending at Discharge       Discharge Details        Discharge Medications      Changes to Medications      Instructions Start Date   warfarin 4 MG tablet  Commonly known as:  COUMADIN  What changed:    · how much to take  · how to take this  · when to take this  · additional instructions   2 mg, Oral, Nightly         Continue These Medications      Instructions Start Date   acetaminophen 650 MG 8 hr tablet  Commonly known as:  TYLENOL   650 mg, Oral, 2 Times Daily      Acetylcysteine 500 MG capsule   1,000 mg, Oral, Daily With Breakfast      albuterol (2.5 MG/3ML) 0.083% nebulizer solution  Commonly known as:  PROVENTIL   2.5 mg, Nebulization, Every 4 Hours PRN      albuterol sulfate  (90 Base) MCG/ACT inhaler  Commonly known as:  PROVENTIL HFA;VENTOLIN HFA;PROAIR HFA   2 puffs,  Inhalation, Every 4 Hours PRN      aspirin 81 MG tablet   81 mg, Oral, Daily      BIOFREEZE ROLL-ON EX   Apply externally      carvedilol 3.125 MG tablet  Commonly known as:  COREG   3.125 mg, Oral, 2 Times Daily      cefadroxil 500 MG capsule  Commonly known as:  DURICEF   500 mg, Oral, 2 Times Daily, Taken consistently for MSSA infection      cetirizine 10 MG tablet  Commonly known as:  zyrTEC   10 mg, Oral, 2 Times Daily      DUPIXENT 300 MG/2ML solution prefilled syringe  Generic drug:  Dupilumab   Subcutaneous, evr other week, due this Friday 02/14/2020      fluticasone 50 MCG/ACT nasal spray  Commonly known as:  FLONASE   1 spray, Nasal, 2 Times Daily      furosemide 20 MG tablet  Commonly known as:  LASIX   20 mg, Oral, Every Morning      glipizide 5 MG tablet  Commonly known as:  GLUCOTROL   2.5 mg, Oral, Every Morning      ketoconazole 2 % cream  Commonly known as:  NIZORAL   Topical, Daily      losartan 25 MG tablet  Commonly known as:  COZAAR   TAKE ONE TABLET BY MOUTH DAILY      LUBRICATING EYE DROPS 0.4-0.3 % solution ophthalmic solution  Generic drug:  polyethyl glycol-propyl glycol   1 drop, Both Eyes, Every 1 Hour PRN      montelukast 10 MG tablet  Commonly known as:  SINGULAIR   10 mg, Oral, Nightly      oxazepam 10 MG capsule  Commonly known as:  SERAX   10 mg, Oral, Nightly      oxybutynin XL 10 MG 24 hr tablet  Commonly known as:  DITROPAN-XL   10 mg, Oral, Every Night at Bedtime      oxyCODONE-acetaminophen 5-325 MG per tablet  Commonly known as:  PERCOCET   1-2 tablets, Oral, Every 4 Hours PRN      rosuvastatin 20 MG tablet  Commonly known as:  CRESTOR   20 mg, Oral, Nightly      TRELEGY ELLIPTA IN   Inhalation, As directed          Stop These Medications    amoxicillin 500 MG capsule  Commonly known as:  AMOXIL     CALCIUM 500+D 500-200 MG-UNIT tablet  Generic drug:  Calcium Carb-Cholecalciferol     fish oil 1000 MG capsule capsule     INCRUSE ELLIPTA 62.5 MCG/INH aerosol powder   Generic  drug:  Umeclidinium Bromide     loperamide 2 MG capsule  Commonly known as:  IMODIUM            Allergies   Allergen Reactions   • Accupril [Quinapril Hcl] Delirium     Swelling, HA,, confusion and constipation per pt   • Ahist [Chlorpheniramine] Nausea Only, Other (See Comments) and Dizziness     Headache, Blurred vision   • Chlorcyclizine Unknown (See Comments)   • Clarithromycin Nausea Only     Other reaction(s): Headache, Depression, Flushed   • Diclofenac Sodium Unknown (See Comments)   • Diphenhydramine      Benadryl   • Esomeprazole GI Intolerance     Nexium. Stomach issues   • Ibuprofen Other (See Comments)     Does not recall    • Levalbuterol Swelling     Xopenex   • Levocetirizine Other (See Comments)     Xyzal. Diarrhea, Stomach cramps   • Lipitor [Atorvastatin] Other (See Comments)     Muscle pain and weakness, dark urine   • Lodine [Etodolac]    • Omeprazole Nausea Only     Prilosec. Headache   • Pravachol [Pravastatin] Nausea Only and Other (See Comments)     Bloated, Constipation, Headaches   • Quinapril Swelling and Confusion     Accupril. Headaches, constipation   • Sulfa Antibiotics    • Sulindac Myalgia     Other reaction(s): Headache, joint pain, bruising   • Valdecoxib Irritability   • Prednisone Rash         Discharge Disposition:  Home or Self Care    Discharge Diet:  Diet Order   Procedures   • Diet Regular; Cardiac, Consistent Carbohydrate       Discharge Activity:   Activity Instructions     Activity as Tolerated            CODE STATUS:    Code Status and Medical Interventions:   Ordered at: 02/11/20 2127     Code Status:    CPR     Medical Interventions (Level of Support Prior to Arrest):    Full       Future Appointments   Date Time Provider Department Center   2/18/2020  9:00 AM ANTI COAG CLINIC [facility] ACOAG None   2/20/2020 11:30 AM PACEART IN OFFICE, [facility] None   3/2/2020  1:10 PM Mehul Miller MD [facility] ID [facility] None   5/28/2020  9:40 AM Alfredo Hunter,  MD [facility] [address] None   7/9/2020 11:40 AM Rommel Veliz MD [facility] CD [facility] None   7/9/2020 12:00 PM ANA IN OFFICE, [facility] JAY CD [facility] None     Additional Instructions for the Follow-ups that You Need to Schedule     Ambulatory Referral to Physical Therapy Evaluate and treat   As directed      Specialty needed:  Evaluate and treat         Referral to Occupational Therapy   As directed      Referral to Speech Therapy   As directed        Follow-up Information     Zenaida Suarez MD Follow up in 1 week(s).    Specialty:  Internal Medicine  Why:  asthma exac. INR 2/17/20  Contact information:  [address] [facility] [address]  [place] [address]  [phone]                   Additional Instructions for the Follow-ups that You Need to Schedule     Ambulatory Referral to Physical Therapy Evaluate and treat   As directed      Specialty needed:  Evaluate and treat         Referral to Occupational Therapy   As directed      Referral to Speech Therapy   As directed        Time Spent on Discharge:  Greater than 35 minutes      Christie Ray MD  [facility]  02/13/20  2:27 PM

## 2020-02-13 NOTE — PROGRESS NOTES
Exercise Oximetry    Patient Name:Caitlyn Longoria   MRN: [id number]   Date: 02/13/20             ROOM AIR BASELINE   SpO2% 94   Heart Rate    Blood Pressure      EXERCISE ON ROOM AIR SpO2% EXERCISE ON O2 @  LPM SpO2%   1 MINUTE 94 1 MINUTE    2 MINUTES 94 2 MINUTES    3 MINUTES 91 3 MINUTES    4 MINUTES 93 4 MINUTES    5 MINUTES 90 5 MINUTES    6 MINUTES 89 6 MINUTES               Distance Walked  250 ft Distance Walked   Dyspnea (Deangelo Scale)   Dyspnea (Deangelo Scale)   Fatigue (Deangelo Scale)   Fatigue (Deangelo Scale)   SpO2% Post Exercise  91 SpO2% Post Exercise   HR Post Exercise   HR Post Exercise   Time to Recovery   Time to Recovery     Comments:Ambulated well in barreto, PT present.  Gait belt and walker used to assist patient.

## 2020-02-13 NOTE — PLAN OF CARE
"No complaints voiced, denies soa, states \"feel better\", o2 at hs for sleep apnea, no decrease in o2 sat's noted, VS'S, Dual paced on moniotr, eyes closed at long interval, resting quietly, will continue to monitor  "

## 2020-02-13 NOTE — DISCHARGE INSTR - APPOINTMENTS
A hospital follow-up appointment has been scheduled for you to see Dr. Suarez on February 27, 2020 at 12:00 p.m.

## 2020-02-14 ENCOUNTER — READMISSION MANAGEMENT (OUTPATIENT)
Dept: CALL CENTER | Facility: HOSPITAL | Age: 85
End: 2020-02-14

## 2020-02-14 ENCOUNTER — ANTICOAGULATION VISIT (OUTPATIENT)
Dept: PHARMACY | Facility: HOSPITAL | Age: 85
End: 2020-02-14

## 2020-02-14 DIAGNOSIS — I48.0 PAF (PAROXYSMAL ATRIAL FIBRILLATION) (HCC): ICD-10-CM

## 2020-02-14 NOTE — PROGRESS NOTES
Discharge Planning Assessment  [facility]     Patient Name: Caitlyn Longoria  MRN: [id number]  Today's Date: 2/14/2020    Admit Date: 2/11/2020    Discharge Needs Assessment    No documentation.       Discharge Plan     Row Name 02/14/20 1218       Plan    Plan  Return home to [facility] Independent living with [facility]     Final Discharge Disposition Code  06 - home with home health care    Final Note   Return home to [place] Independent living with [facility]. [facility] is following. Ken PAEZ        Destination      Coordination has not been started for this encounter.      Durable Medical Equipment      Coordination has not been started for this encounter.      Dialysis/Infusion      Coordination has not been started for this encounter.      Home Medical Care      Service Provider Request Status Selected Services Address Phone Number Fax Number    [facility]A [address] [facility] [address] [address] [phone]      Therapy      Coordination has not been started for this encounter.      Community Resources      Coordination has not been started for this encounter.        Expected Discharge Date and Time     Expected Discharge Date Expected Discharge Time    Feb 13, 2020         Demographic Summary    No documentation.       Functional Status    No documentation.       Psychosocial    No documentation.       Abuse/Neglect    No documentation.       Legal    No documentation.       Substance Abuse    No documentation.       Patient Forms    No documentation.           Mamie Ro, RN

## 2020-02-14 NOTE — OUTREACH NOTE
Prep Survey      Responses   Facility patient discharged from?  [place]   Is patient eligible?  Yes   Discharge diagnosis  Acute resp. failure with hypoxia and hypoxemia, Rhinovirus, asthma AE, long term use anticoagulation, chronic combined CHF, HTN, CKD III, DM II, PAF, CAD   Does the patient have one of the following disease processes/diagnoses(primary or secondary)?  Other   Does the patient have Home health ordered?  No   Is there a DME ordered?  No   Comments regarding appointments  See AVS   General alerts for this patient  Resident of [facility]   Prep survey completed?  Yes          Pattie Fleming RN

## 2020-02-17 ENCOUNTER — READMISSION MANAGEMENT (OUTPATIENT)
Dept: CALL CENTER | Facility: HOSPITAL | Age: 85
End: 2020-02-17

## 2020-02-18 ENCOUNTER — ANTICOAGULATION VISIT (OUTPATIENT)
Dept: PHARMACY | Facility: HOSPITAL | Age: 85
End: 2020-02-18

## 2020-02-18 DIAGNOSIS — I48.0 PAF (PAROXYSMAL ATRIAL FIBRILLATION) (HCC): ICD-10-CM

## 2020-02-18 LAB
INR PPP: 2.4 (ref 0.91–1.09)
PROTHROMBIN TIME: 29.2 SECONDS (ref 10–13.8)

## 2020-02-18 PROCEDURE — 36416 COLLJ CAPILLARY BLOOD SPEC: CPT

## 2020-02-18 PROCEDURE — 85610 PROTHROMBIN TIME: CPT

## 2020-02-18 NOTE — PROGRESS NOTES
Anticoagulation Clinic Progress Note    Anticoagulation Summary  As of 2020    INR goal:   2.0-3.0   TTR:   77.9 % (1.4 y)   INR used for dosin.4 (2020)   Warfarin maintenance plan:   2 mg every Tue, Sat; 4 mg all other days   Weekly warfarin total:   24 mg   No change documented:   Yumiko Avilez   Plan last modified:   Alexander Valiente RPH (2020)   Next INR check:   2020   Priority:   Maintenance   Target end date:   Indefinite    Indications    PAF (paroxysmal atrial fibrillation) (CMS/[facility]) [I48.0]             Anticoagulation Episode Summary     INR check location:       Preferred lab:       Send INR reminders to:    [facility]    Comments:         Anticoagulation Care Providers     Provider Role Specialty Phone number    Rommel Veliz MD Referring Cardiology [phone]          Clinic Interview:  Patient Findings     Negatives:   Signs/symptoms of thrombosis, Signs/symptoms of bleeding,   Laboratory test error suspected, Change in health, Change in alcohol use,   Change in activity, Upcoming invasive procedure, Emergency department   visit, Upcoming dental procedure, Missed doses, Extra doses, Change in   medications, Change in diet/appetite, Hospital admission, Bruising, Other   complaints      Clinical Outcomes     Negatives:   Major bleeding event, Thromboembolic event,   Anticoagulation-related hospital admission, Anticoagulation-related ED   visit, Anticoagulation-related fatality        INR History:  Anticoagulation Monitoring 2020   INR 2.10 - 2.4   INR Date 2020 - 2020   INR Goal 2.0-3.0 2.0-3.0 2.0-3.0   Trend Up Same Same   Last Week Total 18 mg 26 mg 22 mg   Next Week Total 16 mg 22 mg 24 mg   Sun 4 mg 4 mg 4 mg   Mon 4 mg 4 mg -   Tue Hold (); Otherwise 2 mg - 2 mg   Wed Hold (); Otherwise 4 mg - 4 mg   Thu Hold (); Otherwise 4 mg - 4 mg   Fri 4 mg 2 mg () 4 mg   Sat 4 mg (); Otherwise 2 mg 2 mg  2 mg   Visit Report - - -   Some recent data might be hidden       Plan:  1. INR is therapeutic today- see above in Anticoagulation Summary.   Will instruct Caitlyn Longoria to continue their warfarin regimen- see above in Anticoagulation Summary.  2. Follow up in 1 weeks with Mayi from [facility].  3. Patient declines warfarin refills.  4. Verbal and written information provided. Patient expresses understanding and has no further questions at this time.    Yumiko Avilez

## 2020-02-20 ENCOUNTER — CLINICAL SUPPORT NO REQUIREMENTS (OUTPATIENT)
Dept: CARDIOLOGY | Facility: CLINIC | Age: 85
End: 2020-02-20

## 2020-02-20 ENCOUNTER — HOSPITAL ENCOUNTER (OUTPATIENT)
Dept: SPEECH THERAPY | Facility: HOSPITAL | Age: 85
Setting detail: THERAPIES SERIES
End: 2020-02-20

## 2020-02-20 DIAGNOSIS — I49.5 SICK SINUS SYNDROME (HCC): Primary | ICD-10-CM

## 2020-02-20 PROCEDURE — 93280 PM DEVICE PROGR EVAL DUAL: CPT | Performed by: INTERNAL MEDICINE

## 2020-02-24 ENCOUNTER — READMISSION MANAGEMENT (OUTPATIENT)
Dept: CALL CENTER | Facility: HOSPITAL | Age: 85
End: 2020-02-24

## 2020-02-24 ENCOUNTER — ANTICOAGULATION VISIT (OUTPATIENT)
Dept: PHARMACY | Facility: HOSPITAL | Age: 85
End: 2020-02-24

## 2020-02-24 DIAGNOSIS — I48.0 PAF (PAROXYSMAL ATRIAL FIBRILLATION) (HCC): ICD-10-CM

## 2020-02-24 LAB — INR PPP: 2.7

## 2020-02-24 NOTE — PROGRESS NOTES
Anticoagulation Clinic Progress Note    Anticoagulation Summary  As of 2020    INR goal:   2.0-3.0   TTR:   78.2 % (1.4 y)   INR used for dosin.70 (2020)   Warfarin maintenance plan:   2 mg every Tue, Sat; 4 mg all other days   Weekly warfarin total:   24 mg   No change documented:   Alexander Valiente RPH   Plan last modified:   Alexander Valiente RPH (2020)   Next INR check:   3/2/2020   Priority:   Maintenance   Target end date:   Indefinite    Indications    PAF (paroxysmal atrial fibrillation) (CMS/[facility]) [I48.0]             Anticoagulation Episode Summary     INR check location:       Preferred lab:       Send INR reminders to:   [facility]    Comments:         Anticoagulation Care Providers     Provider Role Specialty Phone number    Rommel Veliz MD Referring Cardiology [phone]          INR History:  Anticoagulation Monitoring 2020   INR - 2.4 2.70   INR Date - 2020   INR Goal 2.0-3.0 2.0-3.0 2.0-3.0   Trend Same Same Same   Last Week Total 26 mg 22 mg 24 mg   Next Week Total 22 mg 24 mg 24 mg   Sun 4 mg 4 mg 4 mg   Mon 4 mg - 4 mg   Tue - 2 mg 2 mg   Wed - 4 mg 4 mg   Thu - 4 mg 4 mg   Fri 2 mg () 4 mg 4 mg   Sat 2 mg 2 mg 2 mg   Visit Report - - -   Some recent data might be hidden       Plan:  1. INR is Therapeutic today- see above in Anticoagulation Summary.   Provided instructions to Mayi with [facility] to Continue their warfarin regimen- see above in Anticoagulation Summary.  2. Follow up in 1 week      Alexander Valiente RPH

## 2020-02-24 NOTE — OUTREACH NOTE
Medical Week 2 Survey      Responses   Facility patient discharged from?  [place]   Does the patient have one of the following disease processes/diagnoses(primary or secondary)?  Other   Week 2 attempt successful?  No   Unsuccessful attempts  Attempt 1          Tracy Ruff RN

## 2020-02-25 ENCOUNTER — READMISSION MANAGEMENT (OUTPATIENT)
Dept: CALL CENTER | Facility: HOSPITAL | Age: 85
End: 2020-02-25

## 2020-02-25 NOTE — OUTREACH NOTE
Medical Week 2 Survey      Responses   Facility patient discharged from?  [place]   Does the patient have one of the following disease processes/diagnoses(primary or secondary)?  Other   Week 2 attempt successful?  Yes   Call start time  1719   General alerts for this patient  Resident of [facility]   Discharge diagnosis  Acute resp. failure with hypoxia and hypoxemia, Rhinovirus, asthma AE, long term use anticoagulation, chronic combined CHF, HTN, CKD III, DM II, PAF, CAD   Call end time  1722   Meds reviewed with patient/caregiver?  Yes   Is the patient taking all medications as directed (includes completed medication regime)?  Yes   Comments regarding appointments  Pt has seen allergy MD and pulm MD since D/C   Does the patient have a primary care provider?   Yes   Does the patient have an appointment with their PCP within 7 days of discharge?  Greater than 7 days   Comments regarding PCP  PCP appt 2/27/20   What is preventing the patient from scheduling follow up appointments within 7 days of discharge?  Earlier appointment not available   Nursing Interventions  Verified appointment date/time/provider, Educated patient on importance of making appointment   Has the patient kept scheduled appointments due by today?  Yes   What is the Home health agency?   [facility]   What is the patient's perception of their health status since discharge?  Improving   If the patient is a current smoker, are they able to teach back resources for cessation?  -- [Nonsmoker]   Week 2 Call Completed?  Yes          Christine Freitas RN

## 2020-03-02 ENCOUNTER — OFFICE VISIT (OUTPATIENT)
Dept: INFECTIOUS DISEASES | Facility: CLINIC | Age: 85
End: 2020-03-02

## 2020-03-02 ENCOUNTER — ANTICOAGULATION VISIT (OUTPATIENT)
Dept: PHARMACY | Facility: HOSPITAL | Age: 85
End: 2020-03-02

## 2020-03-02 VITALS
WEIGHT: 216.2 LBS | SYSTOLIC BLOOD PRESSURE: 116 MMHG | TEMPERATURE: 97.6 F | BODY MASS INDEX: 38.31 KG/M2 | HEART RATE: 85 BPM | DIASTOLIC BLOOD PRESSURE: 76 MMHG | HEIGHT: 63 IN

## 2020-03-02 DIAGNOSIS — Z96.659 INFECTION OF PROSTHETIC KNEE JOINT, SUBSEQUENT ENCOUNTER: Primary | ICD-10-CM

## 2020-03-02 DIAGNOSIS — I48.91 ON COUMADIN FOR ATRIAL FIBRILLATION (HCC): ICD-10-CM

## 2020-03-02 DIAGNOSIS — I48.0 PAROXYSMAL ATRIAL FIBRILLATION (HCC): ICD-10-CM

## 2020-03-02 DIAGNOSIS — Z79.2 LONG TERM (CURRENT) USE OF ANTIBIOTICS: ICD-10-CM

## 2020-03-02 DIAGNOSIS — T84.59XD INFECTION OF PROSTHETIC KNEE JOINT, SUBSEQUENT ENCOUNTER: Primary | ICD-10-CM

## 2020-03-02 DIAGNOSIS — I48.0 PAF (PAROXYSMAL ATRIAL FIBRILLATION) (HCC): ICD-10-CM

## 2020-03-02 DIAGNOSIS — Z79.01 ON COUMADIN FOR ATRIAL FIBRILLATION (HCC): ICD-10-CM

## 2020-03-02 LAB — INR PPP: 5.1

## 2020-03-02 PROCEDURE — 99213 OFFICE O/P EST LOW 20 MIN: CPT | Performed by: INTERNAL MEDICINE

## 2020-03-02 RX ORDER — CEPHALEXIN 500 MG/1
500 CAPSULE ORAL EVERY 8 HOURS
Qty: 270 CAPSULE | Refills: 3 | Status: SHIPPED | OUTPATIENT
Start: 2020-03-02 | End: 2020-04-26 | Stop reason: HOSPADM

## 2020-03-02 NOTE — PROGRESS NOTES
CC: f/u Prosthetic right knee infection - culture negative    HPI: Caitlyn Longoria is a 85 y.o. female here for f/u prosthetic right knee infection - culture negative. She remains on suppressive cefadroxil since she is not a surgical candidate (or at least a poor surgical candidate) per my prior d/w Dr Hunter. She denies knee pain. No erythema, heat, or drainage. She continues to see Dr Hunter yearly.     She reports a rhinovirus infection about 3 weeks ago. She feels it is resolved.     She has some bruising she attributes to coumadin use.      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 Sulfa  2. Clarithromycin - headache    Medications:   Current Outpatient Medications:   •  acetaminophen (TYLENOL) 650 MG 8 hr tablet, Take 650 mg by mouth 2 (Two) Times a Day., Disp: , Rfl:   •  Acetylcysteine 500 MG capsule, Take 1,000 mg by mouth Daily With Breakfast., Disp: , Rfl:   •  albuterol (PROVENTIL) (2.5 MG/3ML) 0.083% nebulizer solution, Take 2.5 mg by nebulization Every 4 (Four) Hours As Needed for Wheezing., Disp: , Rfl:   •  albuterol sulfate  (90 Base) MCG/ACT inhaler, Inhale 2 puffs Every 4 (Four) Hours As Needed for Wheezing., Disp: 1 inhaler, Rfl: 3  •  aspirin 81 MG tablet, Take 81 mg by mouth Daily., Disp: , Rfl:   •  carvedilol (COREG) 3.125 MG tablet, Take 1 tablet by mouth 2 (Two) Times a Day., Disp: 180 tablet, Rfl: 3  •  cefadroxil (DURICEF) 500 MG capsule, Take 500 mg by mouth 2 (Two) Times a Day. Taken consistently for MSSA infection, Disp: , Rfl:   •  cetirizine (zyrTEC) 10 MG tablet, Take 10 mg by mouth 2 (Two) Times a Day., Disp: , Rfl:   •  Dupilumab (DUPIXENT) 300 MG/2ML solution prefilled  "syringe, Inject  under the skin into the appropriate area as directed. evr other week, due this Friday 02/14/2020, Disp: , Rfl:   •  fluticasone (FLONASE) 50 MCG/ACT nasal spray, 1 spray into the nostril(s) as directed by provider 2 (Two) Times a Day., Disp: , Rfl:   •  Fluticasone-Umeclidin-Vilant (TRELEGY ELLIPTA IN), Inhale. As directed, Disp: , Rfl:   •  furosemide (LASIX) 20 MG tablet, Take 20 mg by mouth Every Morning., Disp: , Rfl:   •  glipiZIDE (GLUCOTROL) 5 MG tablet, Take 2.5 mg by mouth Every Morning., Disp: , Rfl:   •  ketoconazole (NIZORAL) 2 % cream, Apply  topically to the appropriate area as directed Daily., Disp: , Rfl:   •  losartan (COZAAR) 25 MG tablet, TAKE ONE TABLET BY MOUTH DAILY, Disp: 90 tablet, Rfl: 1  •  Menthol, Topical Analgesic, (BIOFREEZE ROLL-ON EX), Apply  topically., Disp: , Rfl:   •  montelukast (SINGULAIR) 10 MG tablet, Take 10 mg by mouth Every Night., Disp: , Rfl:   •  oxazepam (SERAX) 10 MG capsule, Take 1 capsule by mouth Every Night., Disp: 5 capsule, Rfl: 0  •  oxybutynin XL (DITROPAN-XL) 10 MG 24 hr tablet, Take 10 mg by mouth every night at bedtime., Disp: , Rfl:   •  oxyCODONE-acetaminophen (PERCOCET) 5-325 MG per tablet, Take 1-2 tablets by mouth Every 4 (Four) Hours As Needed (Pain)., Disp: 10 tablet, Rfl: 0  •  polyethyl glycol-propyl glycol (LUBRICATING EYE DROPS) 0.4-0.3 % solution ophthalmic solution, Administer 1 drop to both eyes Every 1 (One) Hour As Needed., Disp: , Rfl:   •  rosuvastatin (CRESTOR) 20 MG tablet, Take 20 mg by mouth Every Night., Disp: , Rfl:   •  warfarin (COUMADIN) 4 MG tablet, Take 0.5 tablets by mouth Every Night., Disp: , Rfl: 0    OBJECTIVE:  /76   Pulse 85   Temp 97.6 °F (36.4 °C)   Ht 160 cm (62.99\")   Wt 98.1 kg (216 lb 3.2 oz)   BMI 38.31 kg/m²     General: awake, alert, NAD, very nice  Eyes: no scleral icterus  ENT: MMM  Respiratory: normal work of breathing on ambient air  GI: Abdomen is obese  Musculoskeletal: R knee " incision is healed; knee is neither hot nor erythematous  Skin: No rashes; bruises on arms  Neurological: Alert and oriented x 3  Psychiatric: Normal mood and affect     DIAGNOSTICS:  CBC, BMP, CRP, ESR reviewed today  Lab Results   Component Value Date    WBC 10.80 02/12/2020    HGB 13.1 02/12/2020    HCT 40.4 02/12/2020    MCV 96.0 02/12/2020     02/12/2020     Lab Results   Component Value Date    GLUCOSE 253 (H) 02/12/2020    CALCIUM 9.2 02/12/2020     02/12/2020    K 4.2 02/12/2020    CO2 23.2 02/12/2020     02/12/2020    BUN 21 02/12/2020    CREATININE 0.94 02/12/2020    EGFRIFNONA 57 (L) 02/12/2020    BCR 22.3 02/12/2020    ANIONGAP 15.8 (H) 02/12/2020     Lab Results   Component Value Date    CRP 0.22 05/17/2019     Lab Results   Component Value Date    SEDRATE 10 05/17/2019     Lab Results   Component Value Date    INR 5.10 03/02/2020    INR 2.70 02/24/2020    INR 2.4 (H) 02/18/2020    PROTIME 29.2 (H) 02/18/2020    PROTIME 24.8 (H) 02/13/2020    PROTIME 19.1 (H) 02/12/2020       Microbiology:  1/14/18 BCx: negative  1/15/18 Synovial Fluid R Knee: Negative at 14 days    ASSESSMENT/PLAN:  1. 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 Long term use of antibiotics  -recent labs reviewed    3. Paroxysmal Atrial fibrillation  -on coumadin; INR followed by anti-coagulation clinic  -will not be affected by cephalexin    RTC 12 months

## 2020-03-02 NOTE — PROGRESS NOTES
Anticoagulation Clinic Progress Note    Anticoagulation Summary  As of 3/2/2020    INR goal:   2.0-3.0   TTR:   77.3 % (1.4 y)   INR used for dosin.10! (3/2/2020)   Warfarin maintenance plan:   2 mg every Tue, Sat; 4 mg all other days   Weekly warfarin total:   24 mg   Plan last modified:   Alexander Valiente [facility] (2020)   Next INR check:   3/6/2020   Priority:   Maintenance   Target end date:   Indefinite    Indications    PAF (paroxysmal atrial fibrillation) (CMS/HCC) [I48.0]             Anticoagulation Episode Summary     INR check location:       Preferred lab:       Send INR reminders to:    [facility]    Comments:         Anticoagulation Care Providers     Provider Role Specialty Phone number    Rommel Veliz MD Referring Cardiology [phone]          Drug interactions: has remained unchanged.  Diet: has remained unchanged.    Clinic Interview:      INR History:  Anticoagulation Monitoring 2020 2020 3/2/2020   INR 2.4 2.70 5.10   INR Date 2020 2020 3/2/2020   INR Goal 2.0-3.0 2.0-3.0 2.0-3.0   Trend Same Same Same   Last Week Total 22 mg 24 mg 24 mg   Next Week Total 24 mg 24 mg 18 mg   Sun 4 mg 4 mg -   Mon - 4 mg Hold (3/2)   Tue 2 mg 2 mg Hold (3/3)   Wed 4 mg 4 mg 4 mg   Thu 4 mg 4 mg 4 mg   Fri 4 mg 4 mg -   Sat 2 mg 2 mg -   Visit Report - - -   Some recent data might be hidden       Plan:  1. INR is Supratherapeutic today- see above in Anticoagulation Summary.   Will instruct Caitlyn Longoria to Change their warfarin regimen- see above in Anticoagulation Summary.  Hold x 2 days.  Resume same then recheck on Friday.  2. Follow up on Friday.  3. Pt has agreed to only be called if INR out of range. They have been instructed to call if any changes in medications, doses, concerns, etc. Patient expresses understanding and has no further questions at this time.    Nash Conde III, [facility]

## 2020-03-03 ENCOUNTER — DOCUMENTATION (OUTPATIENT)
Dept: SPEECH THERAPY | Facility: HOSPITAL | Age: 85
End: 2020-03-03

## 2020-03-03 NOTE — THERAPY DISCHARGE NOTE
Speech Language Pathology Discharge Summary         Patient Name: Caitlyn Longoria  : 1934  MRN: [id number]    Today's Date: 3/3/2020      OP SLP Discharge Summary  Date of Discharge: 20  Reason for Discharge: other (see comments)(Patient called to inform SLP that she was recently discharged from the hospital with home health. )  Discharge Destination: home w/ home health  Discharge Instructions: Recommend return to OP ST as indicated.              Eusebia Mayberry MS CCC-SLP  3/3/2020

## 2020-03-04 ENCOUNTER — READMISSION MANAGEMENT (OUTPATIENT)
Dept: CALL CENTER | Facility: HOSPITAL | Age: 85
End: 2020-03-04

## 2020-03-04 NOTE — OUTREACH NOTE
Medical Week 3 Survey      Responses   [facility] patient discharged from?  [place]   Does the patient have one of the following disease processes/diagnoses(primary or secondary)?  Other   Week 3 attempt successful?  No   Unsuccessful attempts  Attempt 1          Clemencia Hilario RN

## 2020-03-06 ENCOUNTER — ANTICOAGULATION VISIT (OUTPATIENT)
Dept: PHARMACY | Facility: HOSPITAL | Age: 85
End: 2020-03-06

## 2020-03-06 ENCOUNTER — READMISSION MANAGEMENT (OUTPATIENT)
Dept: CALL CENTER | Facility: HOSPITAL | Age: 85
End: 2020-03-06

## 2020-03-06 DIAGNOSIS — I48.0 PAF (PAROXYSMAL ATRIAL FIBRILLATION) (HCC): ICD-10-CM

## 2020-03-06 LAB — INR PPP: 3.5

## 2020-03-06 NOTE — OUTREACH NOTE
Medical Week 3 Survey      Responses   [facility] patient discharged from?  [place]   Does the patient have one of the following disease processes/diagnoses(primary or secondary)?  Other   Week 3 attempt successful?  Yes   Call start time  1514   Call end time  1519   General alerts for this patient  Resident of [facility]   Discharge diagnosis  Acute resp. failure with hypoxia and hypoxemia, Rhinovirus, asthma AE, long term use anticoagulation, chronic combined CHF, HTN, CKD III, DM II, PAF, CAD   Meds reviewed with patient/caregiver?  Yes   Is the patient having any side effects they believe may be caused by any medication additions or changes?  No   Does the patient have all medications ordered at discharge?  Yes   Is the patient taking all medications as directed (includes completed medication regime)?  Yes   Does the patient have a primary care provider?   Yes   Does the patient have an appointment with their PCP within 7 days of discharge?  Yes   Has the patient kept scheduled appointments due by today?  Yes   What is the Home health agency?   [facility]   Has home health visited the patient within 72 hours of discharge?  N/A   Psychosocial issues?  No   Did the patient receive a copy of their discharge instructions?  Yes   Nursing interventions  Reviewed instructions with patient   What is the patient's perception of their health status since discharge?  Improving   Is the patient/caregiver able to teach back signs and symptoms related to disease process for when to call PCP?  Yes   Is the patient/caregiver able to teach back signs and symptoms related to disease process for when to call 911?  Yes   Is the patient/caregiver able to teach back the hierarchy of who to call/visit for symptoms/problems? PCP, Specialist, Home health nurse, Urgent Care, ED, 911  Yes   Week 3 Call Completed?  Yes   Graduated  Yes   Did the patient feel the follow up calls were helpful during their recovery period?  Yes   Was  the number of calls appropriate?  Yes          Guicho Reza RN

## 2020-03-06 NOTE — PROGRESS NOTES
Anticoagulation Clinic Progress Note    Anticoagulation Summary  As of 3/6/2020    INR goal:   2.0-3.0   TTR:   76.7 % (1.4 y)   INR used for dosing:   3.50! (3/6/2020)   Warfarin maintenance plan:   2 mg every Tue, Sat; 4 mg all other days   Weekly warfarin total:   24 mg   Plan last modified:   Alexander Valiente RPH (2/4/2020)   Next INR check:   3/11/2020   Priority:   Maintenance   Target end date:   Indefinite    Indications    PAF (paroxysmal atrial fibrillation) (CMS/HCC) [I48.0]             Anticoagulation Episode Summary     INR check location:       Preferred lab:       Send INR reminders to:    [facility]    Comments:         Anticoagulation Care Providers     Provider Role Specialty Phone number    Rommel Veliz MD Referring Cardiology [phone]        Pertinent info:  Visited pulmonologist yesterday r/t coughing up small amount of blood, which provider voiced may be r/t to nasal drip.    INR History:  Anticoagulation Monitoring 2/24/2020 3/2/2020 3/6/2020   INR 2.70 5.10 3.50   INR Date 2/24/2020 3/2/2020 3/6/2020   INR Goal 2.0-3.0 2.0-3.0 2.0-3.0   Trend Same Same Same   Last Week Total 24 mg 24 mg 18 mg   Next Week Total 24 mg 18 mg 20 mg   Sun 4 mg - 4 mg   Mon 4 mg Hold (3/2) 2 mg (3/9)   Tue 2 mg Hold (3/3) 2 mg   Wed 4 mg 4 mg -   Thu 4 mg 4 mg -   Fri 4 mg - 2 mg (3/6)   Sat 2 mg - 2 mg   Visit Report - - -   Some recent data might be hidden       Plan:  1. INR is Supratherapeutic today- see above in Anticoagulation Summary.   Provided instructions to Mayi with [facility] to Change their warfarin regimen- see above in Anticoagulation Summary.  2. Follow up in 5 days (Mayi will be out of the office Mon/Tues next week, and pt needs continuity with her nursing visits).        Alexander Valiente RPH

## 2020-03-11 ENCOUNTER — ANTICOAGULATION VISIT (OUTPATIENT)
Dept: PHARMACY | Facility: HOSPITAL | Age: 85
End: 2020-03-11

## 2020-03-11 DIAGNOSIS — I48.0 PAF (PAROXYSMAL ATRIAL FIBRILLATION) (HCC): ICD-10-CM

## 2020-03-11 LAB — INR PPP: 2.4

## 2020-03-11 NOTE — PROGRESS NOTES
Anticoagulation Clinic Progress Note    Anticoagulation Summary  As of 3/11/2020    INR goal:   2.0-3.0   TTR:   76.5 % (1.4 y)   INR used for dosin.40 (3/11/2020)   Warfarin maintenance plan:   2 mg every Tue, Thu, Sat; 4 mg all other days   Weekly warfarin total:   22 mg   Plan last modified:   Pao Levi RPH (3/11/2020)   Next INR check:   3/19/2020   Priority:   Maintenance   Target end date:   Indefinite    Indications    PAF (paroxysmal atrial fibrillation) (CMS/[facility]) [I48.0]             Anticoagulation Episode Summary     INR check location:       Preferred lab:       Send INR reminders to:   [facility] [place]    Comments:         Anticoagulation Care Providers     Provider Role Specialty Phone number    Rommel Veliz MD Referring Cardiology [phone]          INR History:  Anticoagulation Monitoring 3/2/2020 3/6/2020 3/11/2020   INR 5.10 3.50 2.40   INR Date 3/2/2020 3/6/2020 3/11/2020   INR Goal 2.0-3.0 2.0-3.0 2.0-3.0   Trend Same Same Down   Last Week Total 24 mg 18 mg 20 mg   Next Week Total 18 mg 20 mg 22 mg   Sun - 4 mg 4 mg   Mon Hold (3/2) 2 mg (3/9) 4 mg   Tue Hold (3/3) 2 mg 2 mg   Wed 4 mg - 4 mg   Thu 4 mg - 2 mg   Fri - 2 mg (3/6) 4 mg   Sat - 2 mg 2 mg   Visit Report - - -   Some recent data might be hidden       Plan:  1. INR is Therapeutic today- see above in Anticoagulation Summary.   Provided instructions to Mayi with [facility] to Change their warfarin regimen- see above in Anticoagulation Summary.  2. Follow up in 1 week--will have patient call our clinic for appointment.      Pao Levi RPH

## 2020-03-18 ENCOUNTER — ANTICOAGULATION VISIT (OUTPATIENT)
Dept: PHARMACY | Facility: HOSPITAL | Age: 85
End: 2020-03-18

## 2020-03-18 DIAGNOSIS — I48.0 PAF (PAROXYSMAL ATRIAL FIBRILLATION) (HCC): ICD-10-CM

## 2020-03-18 LAB
INR PPP: 2.3 (ref 0.91–1.09)
PROTHROMBIN TIME: 28 SECONDS (ref 10–13.8)

## 2020-03-18 PROCEDURE — 36416 COLLJ CAPILLARY BLOOD SPEC: CPT

## 2020-03-18 PROCEDURE — 85610 PROTHROMBIN TIME: CPT

## 2020-03-18 NOTE — PROGRESS NOTES
Anticoagulation Clinic Progress Note    Anticoagulation Summary  As of 3/18/2020    INR goal:   2.0-3.0   TTR:   76.8 % (1.4 y)   INR used for dosin.3 (3/18/2020)   Warfarin maintenance plan:   2 mg every Tue, Thu, Sat; 4 mg all other days   Weekly warfarin total:   22 mg   No change documented:   Christel Velazquez   Plan last modified:   Pao Levi RPH (3/11/2020)   Next INR check:   2020   Priority:   Maintenance   Target end date:   Indefinite    Indications    PAF (paroxysmal atrial fibrillation) (CMS/[facility]) [I48.0]             Anticoagulation Episode Summary     INR check location:       Preferred lab:       Send INR reminders to:    [facility]    Comments:         Anticoagulation Care Providers     Provider Role Specialty Phone number    Rommel Veliz MD Referring Cardiology [phone]          Clinic Interview:  Patient Findings     Negatives:   Signs/symptoms of thrombosis, Signs/symptoms of bleeding,   Laboratory test error suspected, Change in health, Change in alcohol use,   Change in activity, Upcoming invasive procedure, Emergency department   visit, Upcoming dental procedure, Missed doses, Extra doses, Change in   medications, Change in diet/appetite, Hospital admission, Bruising, Other   complaints      Clinical Outcomes     Negatives:   Major bleeding event, Thromboembolic event,   Anticoagulation-related hospital admission, Anticoagulation-related ED   visit, Anticoagulation-related fatality        INR History:  Anticoagulation Monitoring 3/6/2020 3/11/2020 3/18/2020   INR 3.50 2.40 2.3   INR Date 3/6/2020 3/11/2020 3/18/2020   INR Goal 2.0-3.0 2.0-3.0 2.0-3.0   Trend Same Down Same   Last Week Total 18 mg 20 mg 22 mg   Next Week Total 20 mg 22 mg 22 mg   Sun 4 mg 4 mg 4 mg   Mon 2 mg (3/9) 4 mg 4 mg   Tue 2 mg 2 mg 2 mg   Wed - 4 mg 4 mg   Thu - 2 mg 2 mg   Fri 2 mg (3/6) 4 mg 4 mg   Sat 2 mg 2 mg 2 mg   Visit Report - - -   Some recent data might be hidden        Plan:  1. INR is therapeutic today- see above in Anticoagulation Summary.   Will instruct Caitlyn GARRETT Longoria to continue their warfarin regimen- see above in Anticoagulation Summary.  2. Follow up in 2 weeks.  3. Patient declines warfarin refills.  4. Verbal and written information provided. Patient expresses understanding and has no further questions at this time.    Christel Velazquez

## 2020-04-01 ENCOUNTER — ANTICOAGULATION VISIT (OUTPATIENT)
Dept: PHARMACY | Facility: HOSPITAL | Age: 85
End: 2020-04-01

## 2020-04-01 DIAGNOSIS — I48.0 PAF (PAROXYSMAL ATRIAL FIBRILLATION) (HCC): ICD-10-CM

## 2020-04-01 LAB
INR PPP: 3.5 (ref 0.91–1.09)
PROTHROMBIN TIME: 42.3 SECONDS (ref 10–13.8)

## 2020-04-01 PROCEDURE — 36416 COLLJ CAPILLARY BLOOD SPEC: CPT

## 2020-04-01 PROCEDURE — G0463 HOSPITAL OUTPT CLINIC VISIT: HCPCS

## 2020-04-01 PROCEDURE — 85610 PROTHROMBIN TIME: CPT

## 2020-04-01 NOTE — PROGRESS NOTES
Anticoagulation Clinic Progress Note    Anticoagulation Summary  As of 4/1/2020    INR goal:   2.0-3.0   TTR:   76.3 % (1.5 y)   INR used for dosing:   3.5! (4/1/2020)   Warfarin maintenance plan:   2 mg every Tue, Thu, Sat; 4 mg all other days   Weekly warfarin total:   22 mg   Plan last modified:   Pao Levi RPH (3/11/2020)   Next INR check:   4/22/2020   Priority:   Maintenance   Target end date:   Indefinite    Indications    PAF (paroxysmal atrial fibrillation) (CMS/[facility]) [I48.0]             Anticoagulation Episode Summary     INR check location:       Preferred lab:       Send INR reminders to:    [facility]    Comments:         Anticoagulation Care Providers     Provider Role Specialty Phone number    Rommel Veliz MD Referring Cardiology [phone]          Clinic Interview:  Patient Findings     Positives:   Change in medications    Negatives:   Signs/symptoms of thrombosis, Signs/symptoms of bleeding,   Laboratory test error suspected, Change in health, Change in alcohol use,   Change in activity, Upcoming invasive procedure, Emergency department   visit, Upcoming dental procedure, Missed doses, Extra doses, Change in   diet/appetite, Hospital admission, Bruising, Other complaints    Comments:   Longterm efadroxil discontinued due to cost; started   cephalexin over weekend, which will be continued longterm. Finished   prednisone course yesterday.      Clinical Outcomes     Negatives:   Major bleeding event, Thromboembolic event,   Anticoagulation-related hospital admission, Anticoagulation-related ED   visit, Anticoagulation-related fatality    Comments:   Longterm efadroxil discontinued due to cost; started   cephalexin over weekend, which will be continued longterm. Finished   prednisone course yesterday.        INR History:  Anticoagulation Monitoring 3/11/2020 3/18/2020 4/1/2020   INR 2.40 2.3 3.5   INR Date 3/11/2020 3/18/2020 4/1/2020   INR Goal 2.0-3.0 2.0-3.0 2.0-3.0    Trend Down Same Same   Last Week Total 20 mg 22 mg 22 mg   Next Week Total 22 mg 22 mg 20 mg   Sun 4 mg 4 mg 4 mg   Mon 4 mg 4 mg 4 mg   Tue 2 mg 2 mg 2 mg   Wed 4 mg 4 mg 2 mg (4/1); Otherwise 4 mg   Thu 2 mg 2 mg 2 mg   Fri 4 mg 4 mg 4 mg   Sat 2 mg 2 mg 2 mg   Visit Report - - -   Some recent data might be hidden       Plan:  1. INR is Supratherapeutic today- see above in Anticoagulation Summary.  Will instruct Caitlyn Longoria to Change their warfarin regimen- see above in Anticoagulation Summary.  2. Follow up in 3 weeks ; pt refused sooner due to other appt[facility].  3. Patient declines warfarin refills.  4. Verbal and written information provided. Patient expresses understanding and has no further questions at this time.    [facility], located within [facility]

## 2020-04-03 RX ORDER — WARFARIN SODIUM 4 MG/1
TABLET ORAL
Qty: 75 TABLET | Refills: 1 | Status: SHIPPED | OUTPATIENT
Start: 2020-04-03 | End: 2020-10-14 | Stop reason: HOSPADM

## 2020-04-22 ENCOUNTER — ANTICOAGULATION VISIT (OUTPATIENT)
Dept: PHARMACY | Facility: HOSPITAL | Age: 85
End: 2020-04-22

## 2020-04-22 DIAGNOSIS — I48.0 PAF (PAROXYSMAL ATRIAL FIBRILLATION) (HCC): ICD-10-CM

## 2020-04-22 LAB — INR PPP: 3.2

## 2020-04-22 NOTE — PROGRESS NOTES
Anticoagulation Clinic Progress Note    Anticoagulation Summary  As of 4/22/2020    INR goal:   2.0-3.0   TTR:   73.5 % (1.5 y)   INR used for dosing:   3.20! (4/22/2020)   Warfarin maintenance plan:   2 mg every Tue, Thu, Sat; 4 mg all other days   Weekly warfarin total:   22 mg   Plan last modified:   Pao Levi RPH (3/11/2020)   Next INR check:   4/27/2020   Priority:   Maintenance   Target end date:   Indefinite    Indications    PAF (paroxysmal atrial fibrillation) (CMS/HCC) [I48.0]             Anticoagulation Episode Summary     INR check location:       Preferred lab:       Send INR reminders to:   [facility]    Comments:   [facility] Home lab beginning 4/22/20      Anticoagulation Care Providers     Provider Role Specialty Phone number    Rommel Veliz MD Referring Cardiology [phone]          Clinic Interview:      INR History:  Anticoagulation Monitoring 3/18/2020 4/1/2020 4/22/2020   INR 2.3 3.5 3.20   INR Date 3/18/2020 4/1/2020 4/22/2020   INR Goal 2.0-3.0 2.0-3.0 2.0-3.0   Trend Same Same Same   Last Week Total 22 mg 22 mg 22 mg   Next Week Total 22 mg 20 mg 20 mg   Sun 4 mg 4 mg 4 mg   Mon 4 mg 4 mg -   Tue 2 mg 2 mg -   Wed 4 mg 2 mg (4/1); Otherwise 4 mg 2 mg (4/22)   Thu 2 mg 2 mg 2 mg   Fri 4 mg 4 mg 4 mg   Sat 2 mg 2 mg 2 mg   Visit Report - - -   Some recent data might be hidden       Plan:  1. INR is Subtherapeutic today- see above in Anticoagulation Summary.   Will instruct Caitlyn Longoria to Change their warfarin regimen- see above in Anticoagulation Summary.  2. Follow up in 5 days since she is on prednisone.   3. They have been instructed to call if any changes in medications, doses, concerns, etc. Patient expresses understanding and has no further questions at this time.  4. Contacted [facility] and they will do POCT INR on Mon--per Vilma.  Faxed lab order to them at [phone].    Pao Levi RPH

## 2020-04-23 ENCOUNTER — HOSPITAL ENCOUNTER (INPATIENT)
Facility: HOSPITAL | Age: 85
LOS: 3 days | Discharge: HOME HEALTH | End: 2020-04-26
Attending: EMERGENCY MEDICINE | Admitting: HOSPITALIST

## 2020-04-23 ENCOUNTER — APPOINTMENT (OUTPATIENT)
Dept: GENERAL RADIOLOGY | Facility: HOSPITAL | Age: 85
End: 2020-04-23

## 2020-04-23 DIAGNOSIS — Z79.01 CURRENT USE OF LONG TERM ANTICOAGULATION: ICD-10-CM

## 2020-04-23 DIAGNOSIS — J18.9 PNEUMONIA OF RIGHT MIDDLE LOBE DUE TO INFECTIOUS ORGANISM: Primary | ICD-10-CM

## 2020-04-23 DIAGNOSIS — J45.51 SEVERE PERSISTENT ASTHMA WITH ACUTE EXACERBATION: ICD-10-CM

## 2020-04-23 DIAGNOSIS — B34.8 RHINOVIRUS: ICD-10-CM

## 2020-04-23 DIAGNOSIS — J12.9 VIRAL PNEUMONIA: ICD-10-CM

## 2020-04-23 DIAGNOSIS — I48.0 PAF (PAROXYSMAL ATRIAL FIBRILLATION) (HCC): ICD-10-CM

## 2020-04-23 LAB
ALBUMIN SERPL-MCNC: 4 G/DL (ref 3.5–5.2)
ALBUMIN/GLOB SERPL: 1.5 G/DL
ALP SERPL-CCNC: 87 U/L (ref 39–117)
ALT SERPL W P-5'-P-CCNC: 58 U/L (ref 1–33)
ANION GAP SERPL CALCULATED.3IONS-SCNC: 17.1 MMOL/L (ref 5–15)
AST SERPL-CCNC: 54 U/L (ref 1–32)
B PARAPERT DNA SPEC QL NAA+PROBE: NOT DETECTED
B PERT DNA SPEC QL NAA+PROBE: NOT DETECTED
BILIRUB SERPL-MCNC: 0.7 MG/DL (ref 0.2–1.2)
BUN BLD-MCNC: 27 MG/DL (ref 8–23)
BUN/CREAT SERPL: 26 (ref 7–25)
C PNEUM DNA NPH QL NAA+NON-PROBE: NOT DETECTED
CALCIUM SPEC-SCNC: 8.9 MG/DL (ref 8.6–10.5)
CHLORIDE SERPL-SCNC: 98 MMOL/L (ref 98–107)
CO2 SERPL-SCNC: 22.9 MMOL/L (ref 22–29)
CREAT BLD-MCNC: 1.04 MG/DL (ref 0.57–1)
D-LACTATE SERPL-SCNC: 2.4 MMOL/L (ref 0.5–2)
DEPRECATED RDW RBC AUTO: 51.2 FL (ref 37–54)
ERYTHROCYTE [DISTWIDTH] IN BLOOD BY AUTOMATED COUNT: 14.1 % (ref 12.3–15.4)
FLUAV H1 2009 PAND RNA NPH QL NAA+PROBE: NOT DETECTED
FLUAV H1 HA GENE NPH QL NAA+PROBE: NOT DETECTED
FLUAV H3 RNA NPH QL NAA+PROBE: NOT DETECTED
FLUAV SUBTYP SPEC NAA+PROBE: NOT DETECTED
FLUBV RNA ISLT QL NAA+PROBE: NOT DETECTED
GFR SERPL CREATININE-BSD FRML MDRD: 50 ML/MIN/1.73
GLOBULIN UR ELPH-MCNC: 2.7 GM/DL
GLUCOSE BLD-MCNC: 282 MG/DL (ref 65–99)
HADV DNA SPEC NAA+PROBE: NOT DETECTED
HCOV 229E RNA SPEC QL NAA+PROBE: NOT DETECTED
HCOV HKU1 RNA SPEC QL NAA+PROBE: NOT DETECTED
HCOV NL63 RNA SPEC QL NAA+PROBE: NOT DETECTED
HCOV OC43 RNA SPEC QL NAA+PROBE: NOT DETECTED
HCT VFR BLD AUTO: 40.5 % (ref 34–46.6)
HGB BLD-MCNC: 13.6 G/DL (ref 12–15.9)
HMPV RNA NPH QL NAA+NON-PROBE: NOT DETECTED
HOLD SPECIMEN: NORMAL
HPIV1 RNA SPEC QL NAA+PROBE: NOT DETECTED
HPIV2 RNA SPEC QL NAA+PROBE: NOT DETECTED
HPIV3 RNA NPH QL NAA+PROBE: NOT DETECTED
HPIV4 P GENE NPH QL NAA+PROBE: NOT DETECTED
INR PPP: 3.45 (ref 0.9–1.1)
LYMPHOCYTES # BLD MANUAL: 10.76 10*3/MM3 (ref 0.7–3.1)
LYMPHOCYTES NFR BLD MANUAL: 1 % (ref 5–12)
LYMPHOCYTES NFR BLD MANUAL: 64 % (ref 19.6–45.3)
M PNEUMO IGG SER IA-ACNC: NOT DETECTED
MCH RBC QN AUTO: 32.9 PG (ref 26.6–33)
MCHC RBC AUTO-ENTMCNC: 33.6 G/DL (ref 31.5–35.7)
MCV RBC AUTO: 98.1 FL (ref 79–97)
MONOCYTES # BLD AUTO: 0.17 10*3/MM3 (ref 0.1–0.9)
NEUTROPHILS # BLD AUTO: 5.89 10*3/MM3 (ref 1.7–7)
NEUTROPHILS NFR BLD MANUAL: 35 % (ref 42.7–76)
NT-PROBNP SERPL-MCNC: 386 PG/ML (ref 5–1800)
PLAT MORPH BLD: NORMAL
PLATELET # BLD AUTO: 223 10*3/MM3 (ref 140–450)
PMV BLD AUTO: 10 FL (ref 6–12)
POTASSIUM BLD-SCNC: 3.4 MMOL/L (ref 3.5–5.2)
PROCALCITONIN SERPL-MCNC: 0.06 NG/ML (ref 0.1–0.25)
PROT SERPL-MCNC: 6.7 G/DL (ref 6–8.5)
PROTHROMBIN TIME: 34.4 SECONDS (ref 11.7–14.2)
RBC # BLD AUTO: 4.13 10*6/MM3 (ref 3.77–5.28)
RBC MORPH BLD: NORMAL
RHINOVIRUS RNA SPEC NAA+PROBE: DETECTED
RSV RNA NPH QL NAA+NON-PROBE: NOT DETECTED
SODIUM BLD-SCNC: 138 MMOL/L (ref 136–145)
TROPONIN T SERPL-MCNC: <0.01 NG/ML (ref 0–0.03)
WBC MORPH BLD: NORMAL
WBC NRBC COR # BLD: 16.82 10*3/MM3 (ref 3.4–10.8)
WHOLE BLOOD HOLD SPECIMEN: NORMAL
WHOLE BLOOD HOLD SPECIMEN: NORMAL

## 2020-04-23 PROCEDURE — 83880 ASSAY OF NATRIURETIC PEPTIDE: CPT | Performed by: EMERGENCY MEDICINE

## 2020-04-23 PROCEDURE — 71045 X-RAY EXAM CHEST 1 VIEW: CPT

## 2020-04-23 PROCEDURE — 84484 ASSAY OF TROPONIN QUANT: CPT | Performed by: EMERGENCY MEDICINE

## 2020-04-23 PROCEDURE — 93010 ELECTROCARDIOGRAM REPORT: CPT | Performed by: INTERNAL MEDICINE

## 2020-04-23 PROCEDURE — 25010000002 CEFTRIAXONE PER 250 MG: Performed by: EMERGENCY MEDICINE

## 2020-04-23 PROCEDURE — 85007 BL SMEAR W/DIFF WBC COUNT: CPT | Performed by: EMERGENCY MEDICINE

## 2020-04-23 PROCEDURE — 83605 ASSAY OF LACTIC ACID: CPT | Performed by: EMERGENCY MEDICINE

## 2020-04-23 PROCEDURE — 87040 BLOOD CULTURE FOR BACTERIA: CPT | Performed by: EMERGENCY MEDICINE

## 2020-04-23 PROCEDURE — 85025 COMPLETE CBC W/AUTO DIFF WBC: CPT | Performed by: EMERGENCY MEDICINE

## 2020-04-23 PROCEDURE — 0100U HC BIOFIRE FILMARRAY RESP PANEL 2: CPT | Performed by: EMERGENCY MEDICINE

## 2020-04-23 PROCEDURE — 84145 PROCALCITONIN (PCT): CPT | Performed by: EMERGENCY MEDICINE

## 2020-04-23 PROCEDURE — 80053 COMPREHEN METABOLIC PANEL: CPT | Performed by: EMERGENCY MEDICINE

## 2020-04-23 PROCEDURE — 36415 COLL VENOUS BLD VENIPUNCTURE: CPT

## 2020-04-23 PROCEDURE — 93005 ELECTROCARDIOGRAM TRACING: CPT | Performed by: EMERGENCY MEDICINE

## 2020-04-23 PROCEDURE — U0002 COVID-19 LAB TEST NON-CDC: HCPCS | Performed by: EMERGENCY MEDICINE

## 2020-04-23 PROCEDURE — 99284 EMERGENCY DEPT VISIT MOD MDM: CPT

## 2020-04-23 PROCEDURE — 85610 PROTHROMBIN TIME: CPT | Performed by: EMERGENCY MEDICINE

## 2020-04-23 RX ORDER — ONDANSETRON 2 MG/ML
4 INJECTION INTRAMUSCULAR; INTRAVENOUS EVERY 6 HOURS PRN
Status: DISCONTINUED | OUTPATIENT
Start: 2020-04-23 | End: 2020-04-26 | Stop reason: HOSPADM

## 2020-04-23 RX ORDER — SODIUM CHLORIDE 0.9 % (FLUSH) 0.9 %
10 SYRINGE (ML) INJECTION EVERY 12 HOURS SCHEDULED
Status: DISCONTINUED | OUTPATIENT
Start: 2020-04-23 | End: 2020-04-26 | Stop reason: HOSPADM

## 2020-04-23 RX ORDER — NICOTINE POLACRILEX 4 MG
15 LOZENGE BUCCAL
Status: DISCONTINUED | OUTPATIENT
Start: 2020-04-23 | End: 2020-04-26 | Stop reason: HOSPADM

## 2020-04-23 RX ORDER — ACETAMINOPHEN 325 MG/1
650 TABLET ORAL EVERY 4 HOURS PRN
Status: DISCONTINUED | OUTPATIENT
Start: 2020-04-23 | End: 2020-04-26 | Stop reason: HOSPADM

## 2020-04-23 RX ORDER — CEFTRIAXONE SODIUM 1 G/50ML
1 INJECTION, SOLUTION INTRAVENOUS EVERY 24 HOURS
Status: DISCONTINUED | OUTPATIENT
Start: 2020-04-24 | End: 2020-04-26

## 2020-04-23 RX ORDER — DEXTROSE MONOHYDRATE 25 G/50ML
25 INJECTION, SOLUTION INTRAVENOUS
Status: DISCONTINUED | OUTPATIENT
Start: 2020-04-23 | End: 2020-04-26 | Stop reason: HOSPADM

## 2020-04-23 RX ORDER — CEFTRIAXONE SODIUM 1 G/50ML
1 INJECTION, SOLUTION INTRAVENOUS ONCE
Status: COMPLETED | OUTPATIENT
Start: 2020-04-23 | End: 2020-04-24

## 2020-04-23 RX ORDER — SODIUM CHLORIDE 0.9 % (FLUSH) 0.9 %
10 SYRINGE (ML) INJECTION AS NEEDED
Status: DISCONTINUED | OUTPATIENT
Start: 2020-04-23 | End: 2020-04-26 | Stop reason: HOSPADM

## 2020-04-23 RX ORDER — ACETAMINOPHEN 650 MG/1
650 SUPPOSITORY RECTAL EVERY 4 HOURS PRN
Status: DISCONTINUED | OUTPATIENT
Start: 2020-04-23 | End: 2020-04-26 | Stop reason: HOSPADM

## 2020-04-23 RX ORDER — NITROGLYCERIN 0.4 MG/1
0.4 TABLET SUBLINGUAL
Status: DISCONTINUED | OUTPATIENT
Start: 2020-04-23 | End: 2020-04-26 | Stop reason: HOSPADM

## 2020-04-23 RX ORDER — ACETAMINOPHEN 160 MG/5ML
650 SOLUTION ORAL EVERY 4 HOURS PRN
Status: DISCONTINUED | OUTPATIENT
Start: 2020-04-23 | End: 2020-04-26 | Stop reason: HOSPADM

## 2020-04-23 RX ADMIN — CEFTRIAXONE SODIUM 1 G: 1 INJECTION, SOLUTION INTRAVENOUS at 23:19

## 2020-04-24 LAB
D-LACTATE SERPL-SCNC: 1.2 MMOL/L (ref 0.5–2)
GLUCOSE BLDC GLUCOMTR-MCNC: 118 MG/DL (ref 70–130)
GLUCOSE BLDC GLUCOMTR-MCNC: 122 MG/DL (ref 70–130)
GLUCOSE BLDC GLUCOMTR-MCNC: 180 MG/DL (ref 70–130)
HBA1C MFR BLD: 7.32 % (ref 4.8–5.6)
INR PPP: 3.82 (ref 0.9–1.1)
LACTATE HOLD SPECIMEN: NORMAL
PROTHROMBIN TIME: 37.4 SECONDS (ref 11.7–14.2)

## 2020-04-24 PROCEDURE — 82962 GLUCOSE BLOOD TEST: CPT

## 2020-04-24 PROCEDURE — 63710000001 INSULIN LISPRO (HUMAN) PER 5 UNITS: Performed by: NURSE PRACTITIONER

## 2020-04-24 PROCEDURE — 85610 PROTHROMBIN TIME: CPT | Performed by: NURSE PRACTITIONER

## 2020-04-24 PROCEDURE — 83036 HEMOGLOBIN GLYCOSYLATED A1C: CPT | Performed by: NURSE PRACTITIONER

## 2020-04-24 PROCEDURE — 36415 COLL VENOUS BLD VENIPUNCTURE: CPT | Performed by: NURSE PRACTITIONER

## 2020-04-24 PROCEDURE — 25010000002 CEFTRIAXONE PER 250 MG: Performed by: NURSE PRACTITIONER

## 2020-04-24 PROCEDURE — 83605 ASSAY OF LACTIC ACID: CPT | Performed by: EMERGENCY MEDICINE

## 2020-04-24 RX ORDER — OXYBUTYNIN CHLORIDE 10 MG/1
10 TABLET, EXTENDED RELEASE ORAL NIGHTLY
Status: DISCONTINUED | OUTPATIENT
Start: 2020-04-24 | End: 2020-04-26 | Stop reason: HOSPADM

## 2020-04-24 RX ORDER — FLUTICASONE PROPIONATE 50 MCG
1 SPRAY, SUSPENSION (ML) NASAL 2 TIMES DAILY
Status: DISCONTINUED | OUTPATIENT
Start: 2020-04-24 | End: 2020-04-26 | Stop reason: HOSPADM

## 2020-04-24 RX ORDER — ASPIRIN 81 MG/1
81 TABLET ORAL DAILY
Status: DISCONTINUED | OUTPATIENT
Start: 2020-04-24 | End: 2020-04-26 | Stop reason: HOSPADM

## 2020-04-24 RX ORDER — CARVEDILOL 3.12 MG/1
3.12 TABLET ORAL 2 TIMES DAILY
Status: DISCONTINUED | OUTPATIENT
Start: 2020-04-24 | End: 2020-04-26 | Stop reason: HOSPADM

## 2020-04-24 RX ORDER — FUROSEMIDE 20 MG/1
20 TABLET ORAL EVERY MORNING
Status: DISCONTINUED | OUTPATIENT
Start: 2020-04-24 | End: 2020-04-26 | Stop reason: HOSPADM

## 2020-04-24 RX ORDER — ROSUVASTATIN CALCIUM 20 MG/1
20 TABLET, COATED ORAL NIGHTLY
Status: DISCONTINUED | OUTPATIENT
Start: 2020-04-24 | End: 2020-04-26 | Stop reason: HOSPADM

## 2020-04-24 RX ORDER — LOSARTAN POTASSIUM 25 MG/1
25 TABLET ORAL DAILY
Status: DISCONTINUED | OUTPATIENT
Start: 2020-04-24 | End: 2020-04-26 | Stop reason: HOSPADM

## 2020-04-24 RX ORDER — WARFARIN SODIUM 4 MG/1
4 TABLET ORAL
Status: DISCONTINUED | OUTPATIENT
Start: 2020-04-24 | End: 2020-04-24

## 2020-04-24 RX ORDER — ALBUTEROL SULFATE 2.5 MG/3ML
2.5 SOLUTION RESPIRATORY (INHALATION) EVERY 4 HOURS PRN
Status: DISCONTINUED | OUTPATIENT
Start: 2020-04-24 | End: 2020-04-24

## 2020-04-24 RX ORDER — CETIRIZINE HYDROCHLORIDE 10 MG/1
10 TABLET ORAL 2 TIMES DAILY
Status: DISCONTINUED | OUTPATIENT
Start: 2020-04-24 | End: 2020-04-26 | Stop reason: HOSPADM

## 2020-04-24 RX ORDER — MONTELUKAST SODIUM 10 MG/1
10 TABLET ORAL NIGHTLY
Status: DISCONTINUED | OUTPATIENT
Start: 2020-04-24 | End: 2020-04-26 | Stop reason: HOSPADM

## 2020-04-24 RX ORDER — POTASSIUM CHLORIDE 750 MG/1
40 CAPSULE, EXTENDED RELEASE ORAL ONCE
Status: COMPLETED | OUTPATIENT
Start: 2020-04-24 | End: 2020-04-24

## 2020-04-24 RX ADMIN — LOSARTAN POTASSIUM 25 MG: 25 TABLET, FILM COATED ORAL at 14:05

## 2020-04-24 RX ADMIN — POTASSIUM CHLORIDE 40 MEQ: 10 CAPSULE, COATED, EXTENDED RELEASE ORAL at 14:05

## 2020-04-24 RX ADMIN — SODIUM CHLORIDE, PRESERVATIVE FREE 10 ML: 5 INJECTION INTRAVENOUS at 01:33

## 2020-04-24 RX ADMIN — FUROSEMIDE 20 MG: 20 TABLET ORAL at 14:05

## 2020-04-24 RX ADMIN — FLUTICASONE PROPIONATE 1 SPRAY: 50 SPRAY, METERED NASAL at 20:23

## 2020-04-24 RX ADMIN — CARVEDILOL 3.12 MG: 3.12 TABLET, FILM COATED ORAL at 14:05

## 2020-04-24 RX ADMIN — INSULIN LISPRO 2 UNITS: 100 INJECTION, SOLUTION INTRAVENOUS; SUBCUTANEOUS at 17:29

## 2020-04-24 RX ADMIN — ROSUVASTATIN CALCIUM 20 MG: 20 TABLET, FILM COATED ORAL at 20:20

## 2020-04-24 RX ADMIN — FLUTICASONE PROPIONATE 1 SPRAY: 50 SPRAY, METERED NASAL at 14:06

## 2020-04-24 RX ADMIN — OXYBUTYNIN CHLORIDE 10 MG: 10 TABLET, EXTENDED RELEASE ORAL at 20:20

## 2020-04-24 RX ADMIN — MONTELUKAST SODIUM 10 MG: 10 TABLET, FILM COATED ORAL at 20:20

## 2020-04-24 RX ADMIN — SODIUM CHLORIDE, PRESERVATIVE FREE 10 ML: 5 INJECTION INTRAVENOUS at 09:53

## 2020-04-24 RX ADMIN — SODIUM CHLORIDE, PRESERVATIVE FREE 10 ML: 5 INJECTION INTRAVENOUS at 20:21

## 2020-04-24 RX ADMIN — CETIRIZINE HYDROCHLORIDE 10 MG: 10 TABLET, FILM COATED ORAL at 14:05

## 2020-04-24 RX ADMIN — CEFTRIAXONE SODIUM 1 G: 1 INJECTION, SOLUTION INTRAVENOUS at 20:19

## 2020-04-24 RX ADMIN — CETIRIZINE HYDROCHLORIDE 10 MG: 10 TABLET, FILM COATED ORAL at 20:20

## 2020-04-24 RX ADMIN — CARVEDILOL 3.12 MG: 3.12 TABLET, FILM COATED ORAL at 20:20

## 2020-04-24 RX ADMIN — ASPIRIN 81 MG: 81 TABLET, COATED ORAL at 14:05

## 2020-04-25 LAB
ANION GAP SERPL CALCULATED.3IONS-SCNC: 19.5 MMOL/L (ref 5–15)
BUN BLD-MCNC: 25 MG/DL (ref 8–23)
BUN/CREAT SERPL: 26.3 (ref 7–25)
CALCIUM SPEC-SCNC: 8.6 MG/DL (ref 8.6–10.5)
CHLORIDE SERPL-SCNC: 99 MMOL/L (ref 98–107)
CO2 SERPL-SCNC: 20.5 MMOL/L (ref 22–29)
CREAT BLD-MCNC: 0.95 MG/DL (ref 0.57–1)
CRP SERPL-MCNC: 0.07 MG/DL (ref 0–0.5)
DEPRECATED RDW RBC AUTO: 51.7 FL (ref 37–54)
ERYTHROCYTE [DISTWIDTH] IN BLOOD BY AUTOMATED COUNT: 13.9 % (ref 12.3–15.4)
GFR SERPL CREATININE-BSD FRML MDRD: 56 ML/MIN/1.73
GLUCOSE BLD-MCNC: 206 MG/DL (ref 65–99)
GLUCOSE BLDC GLUCOMTR-MCNC: 132 MG/DL (ref 70–130)
GLUCOSE BLDC GLUCOMTR-MCNC: 154 MG/DL (ref 70–130)
GLUCOSE BLDC GLUCOMTR-MCNC: 163 MG/DL (ref 70–130)
HCT VFR BLD AUTO: 40.2 % (ref 34–46.6)
HGB BLD-MCNC: 13.4 G/DL (ref 12–15.9)
INR PPP: 2.84 (ref 0.9–1.1)
MCH RBC QN AUTO: 33.2 PG (ref 26.6–33)
MCHC RBC AUTO-ENTMCNC: 33.3 G/DL (ref 31.5–35.7)
MCV RBC AUTO: 99.5 FL (ref 79–97)
PLATELET # BLD AUTO: 182 10*3/MM3 (ref 140–450)
PMV BLD AUTO: 9.7 FL (ref 6–12)
POTASSIUM BLD-SCNC: 3.5 MMOL/L (ref 3.5–5.2)
PROTHROMBIN TIME: 29.6 SECONDS (ref 11.7–14.2)
RBC # BLD AUTO: 4.04 10*6/MM3 (ref 3.77–5.28)
REF LAB TEST METHOD: NORMAL
SARS-COV-2 RNA RESP QL NAA+PROBE: NOT DETECTED
SODIUM BLD-SCNC: 139 MMOL/L (ref 136–145)
WBC NRBC COR # BLD: 16.94 10*3/MM3 (ref 3.4–10.8)

## 2020-04-25 PROCEDURE — 82962 GLUCOSE BLOOD TEST: CPT

## 2020-04-25 PROCEDURE — 63710000001 INSULIN LISPRO (HUMAN) PER 5 UNITS: Performed by: NURSE PRACTITIONER

## 2020-04-25 PROCEDURE — 86140 C-REACTIVE PROTEIN: CPT | Performed by: NURSE PRACTITIONER

## 2020-04-25 PROCEDURE — 25010000002 CEFTRIAXONE PER 250 MG: Performed by: NURSE PRACTITIONER

## 2020-04-25 PROCEDURE — 80048 BASIC METABOLIC PNL TOTAL CA: CPT | Performed by: NURSE PRACTITIONER

## 2020-04-25 PROCEDURE — 85027 COMPLETE CBC AUTOMATED: CPT | Performed by: NURSE PRACTITIONER

## 2020-04-25 PROCEDURE — 85610 PROTHROMBIN TIME: CPT | Performed by: NURSE PRACTITIONER

## 2020-04-25 RX ORDER — FAMOTIDINE 20 MG/1
20 TABLET, FILM COATED ORAL
Status: DISCONTINUED | OUTPATIENT
Start: 2020-04-25 | End: 2020-04-26 | Stop reason: HOSPADM

## 2020-04-25 RX ORDER — LOPERAMIDE HYDROCHLORIDE 2 MG/1
2 CAPSULE ORAL 3 TIMES DAILY PRN
Status: DISCONTINUED | OUTPATIENT
Start: 2020-04-25 | End: 2020-04-26 | Stop reason: HOSPADM

## 2020-04-25 RX ORDER — WARFARIN SODIUM 2 MG/1
2 TABLET ORAL
Status: COMPLETED | OUTPATIENT
Start: 2020-04-25 | End: 2020-04-25

## 2020-04-25 RX ADMIN — OXYBUTYNIN CHLORIDE 10 MG: 10 TABLET, EXTENDED RELEASE ORAL at 21:14

## 2020-04-25 RX ADMIN — FLUTICASONE PROPIONATE 1 SPRAY: 50 SPRAY, METERED NASAL at 06:38

## 2020-04-25 RX ADMIN — CETIRIZINE HYDROCHLORIDE 10 MG: 10 TABLET, FILM COATED ORAL at 21:14

## 2020-04-25 RX ADMIN — SODIUM CHLORIDE, PRESERVATIVE FREE 10 ML: 5 INJECTION INTRAVENOUS at 09:15

## 2020-04-25 RX ADMIN — FUROSEMIDE 20 MG: 20 TABLET ORAL at 06:36

## 2020-04-25 RX ADMIN — CARVEDILOL 3.12 MG: 3.12 TABLET, FILM COATED ORAL at 21:14

## 2020-04-25 RX ADMIN — CEFTRIAXONE SODIUM 1 G: 1 INJECTION, SOLUTION INTRAVENOUS at 21:14

## 2020-04-25 RX ADMIN — ASPIRIN 81 MG: 81 TABLET, COATED ORAL at 06:36

## 2020-04-25 RX ADMIN — FAMOTIDINE 20 MG: 20 TABLET, FILM COATED ORAL at 19:30

## 2020-04-25 RX ADMIN — ROSUVASTATIN CALCIUM 20 MG: 20 TABLET, FILM COATED ORAL at 21:14

## 2020-04-25 RX ADMIN — INSULIN LISPRO 2 UNITS: 100 INJECTION, SOLUTION INTRAVENOUS; SUBCUTANEOUS at 12:41

## 2020-04-25 RX ADMIN — LOSARTAN POTASSIUM 25 MG: 25 TABLET, FILM COATED ORAL at 06:37

## 2020-04-25 RX ADMIN — CARVEDILOL 3.12 MG: 3.12 TABLET, FILM COATED ORAL at 06:37

## 2020-04-25 RX ADMIN — MONTELUKAST SODIUM 10 MG: 10 TABLET, FILM COATED ORAL at 21:14

## 2020-04-25 RX ADMIN — WARFARIN 2 MG: 2 TABLET ORAL at 19:29

## 2020-04-25 RX ADMIN — CETIRIZINE HYDROCHLORIDE 10 MG: 10 TABLET, FILM COATED ORAL at 06:37

## 2020-04-25 RX ADMIN — SODIUM CHLORIDE, PRESERVATIVE FREE 10 ML: 5 INJECTION INTRAVENOUS at 21:15

## 2020-04-25 RX ADMIN — INSULIN LISPRO 2 UNITS: 100 INJECTION, SOLUTION INTRAVENOUS; SUBCUTANEOUS at 17:17

## 2020-04-26 ENCOUNTER — READMISSION MANAGEMENT (OUTPATIENT)
Dept: CALL CENTER | Facility: HOSPITAL | Age: 85
End: 2020-04-26

## 2020-04-26 VITALS
RESPIRATION RATE: 18 BRPM | DIASTOLIC BLOOD PRESSURE: 85 MMHG | TEMPERATURE: 97.9 F | HEIGHT: 63 IN | WEIGHT: 213.4 LBS | OXYGEN SATURATION: 96 % | SYSTOLIC BLOOD PRESSURE: 119 MMHG | HEART RATE: 82 BPM | BODY MASS INDEX: 37.81 KG/M2

## 2020-04-26 PROBLEM — J12.9 VIRAL PNEUMONIA: Status: ACTIVE | Noted: 2020-04-23

## 2020-04-26 LAB
ADV 40+41 DNA STL QL NAA+NON-PROBE: NOT DETECTED
ANION GAP SERPL CALCULATED.3IONS-SCNC: 13.3 MMOL/L (ref 5–15)
ASTRO TYP 1-8 RNA STL QL NAA+NON-PROBE: NOT DETECTED
BUN BLD-MCNC: 21 MG/DL (ref 8–23)
BUN/CREAT SERPL: 24.7 (ref 7–25)
C CAYETANENSIS DNA STL QL NAA+NON-PROBE: NOT DETECTED
C DIFF TOX GENS STL QL NAA+PROBE: NEGATIVE
CALCIUM SPEC-SCNC: 8.3 MG/DL (ref 8.6–10.5)
CAMPY SP DNA.DIARRHEA STL QL NAA+PROBE: NOT DETECTED
CHLORIDE SERPL-SCNC: 100 MMOL/L (ref 98–107)
CO2 SERPL-SCNC: 26.7 MMOL/L (ref 22–29)
CREAT BLD-MCNC: 0.85 MG/DL (ref 0.57–1)
CRYPTOSP STL CULT: NOT DETECTED
DEPRECATED RDW RBC AUTO: 49.7 FL (ref 37–54)
E COLI DNA SPEC QL NAA+PROBE: NOT DETECTED
E HISTOLYT AG STL-ACNC: NOT DETECTED
EAEC PAA PLAS AGGR+AATA ST NAA+NON-PRB: NOT DETECTED
EC STX1 + STX2 GENES STL NAA+PROBE: NOT DETECTED
EPEC EAE GENE STL QL NAA+NON-PROBE: NOT DETECTED
ERYTHROCYTE [DISTWIDTH] IN BLOOD BY AUTOMATED COUNT: 13.9 % (ref 12.3–15.4)
ETEC LTA+ST1A+ST1B TOX ST NAA+NON-PROBE: NOT DETECTED
G LAMBLIA DNA SPEC QL NAA+PROBE: NOT DETECTED
GFR SERPL CREATININE-BSD FRML MDRD: 64 ML/MIN/1.73
GLUCOSE BLD-MCNC: 148 MG/DL (ref 65–99)
GLUCOSE BLDC GLUCOMTR-MCNC: 139 MG/DL (ref 70–130)
GLUCOSE BLDC GLUCOMTR-MCNC: 189 MG/DL (ref 70–130)
HCT VFR BLD AUTO: 38.1 % (ref 34–46.6)
HGB BLD-MCNC: 12.5 G/DL (ref 12–15.9)
INR PPP: 1.92 (ref 0.9–1.1)
MCH RBC QN AUTO: 32.1 PG (ref 26.6–33)
MCHC RBC AUTO-ENTMCNC: 32.8 G/DL (ref 31.5–35.7)
MCV RBC AUTO: 97.9 FL (ref 79–97)
NOROVIRUS GI+II RNA STL QL NAA+NON-PROBE: NOT DETECTED
P SHIGELLOIDES DNA STL QL NAA+PROBE: NOT DETECTED
PLATELET # BLD AUTO: 152 10*3/MM3 (ref 140–450)
PMV BLD AUTO: 9.5 FL (ref 6–12)
POTASSIUM BLD-SCNC: 3.8 MMOL/L (ref 3.5–5.2)
PROTHROMBIN TIME: 21.7 SECONDS (ref 11.7–14.2)
RBC # BLD AUTO: 3.89 10*6/MM3 (ref 3.77–5.28)
RV RNA STL NAA+PROBE: NOT DETECTED
SALMONELLA DNA SPEC QL NAA+PROBE: NOT DETECTED
SAPO I+II+IV+V RNA STL QL NAA+NON-PROBE: NOT DETECTED
SHIGELLA SP+EIEC IPAH STL QL NAA+PROBE: NOT DETECTED
SODIUM BLD-SCNC: 140 MMOL/L (ref 136–145)
V CHOLERAE DNA SPEC QL NAA+PROBE: NOT DETECTED
VIBRIO DNA SPEC NAA+PROBE: NOT DETECTED
WBC NRBC COR # BLD: 12.04 10*3/MM3 (ref 3.4–10.8)
YERSINIA STL CULT: NOT DETECTED

## 2020-04-26 PROCEDURE — 63710000001 INSULIN LISPRO (HUMAN) PER 5 UNITS: Performed by: NURSE PRACTITIONER

## 2020-04-26 PROCEDURE — 85027 COMPLETE CBC AUTOMATED: CPT | Performed by: NURSE PRACTITIONER

## 2020-04-26 PROCEDURE — 82962 GLUCOSE BLOOD TEST: CPT

## 2020-04-26 PROCEDURE — 87493 C DIFF AMPLIFIED PROBE: CPT | Performed by: NURSE PRACTITIONER

## 2020-04-26 PROCEDURE — 80048 BASIC METABOLIC PNL TOTAL CA: CPT | Performed by: NURSE PRACTITIONER

## 2020-04-26 PROCEDURE — 85610 PROTHROMBIN TIME: CPT | Performed by: NURSE PRACTITIONER

## 2020-04-26 PROCEDURE — 0097U HC BIOFIRE FILMARRAY GI PANEL: CPT | Performed by: NURSE PRACTITIONER

## 2020-04-26 RX ORDER — WARFARIN SODIUM 2 MG/1
2 TABLET ORAL 3 TIMES WEEKLY
Status: DISCONTINUED | OUTPATIENT
Start: 2020-04-28 | End: 2020-04-26 | Stop reason: HOSPADM

## 2020-04-26 RX ORDER — WARFARIN SODIUM 4 MG/1
4 TABLET ORAL
Status: DISCONTINUED | OUTPATIENT
Start: 2020-04-26 | End: 2020-04-26 | Stop reason: HOSPADM

## 2020-04-26 RX ADMIN — CETIRIZINE HYDROCHLORIDE 10 MG: 10 TABLET, FILM COATED ORAL at 08:44

## 2020-04-26 RX ADMIN — SODIUM CHLORIDE, PRESERVATIVE FREE 10 ML: 5 INJECTION INTRAVENOUS at 08:45

## 2020-04-26 RX ADMIN — CARVEDILOL 3.12 MG: 3.12 TABLET, FILM COATED ORAL at 08:44

## 2020-04-26 RX ADMIN — LOSARTAN POTASSIUM 25 MG: 25 TABLET, FILM COATED ORAL at 08:44

## 2020-04-26 RX ADMIN — FAMOTIDINE 20 MG: 20 TABLET, FILM COATED ORAL at 08:43

## 2020-04-26 RX ADMIN — ASPIRIN 81 MG: 81 TABLET, COATED ORAL at 08:44

## 2020-04-26 RX ADMIN — FUROSEMIDE 20 MG: 20 TABLET ORAL at 08:44

## 2020-04-26 RX ADMIN — INSULIN LISPRO 2 UNITS: 100 INJECTION, SOLUTION INTRAVENOUS; SUBCUTANEOUS at 12:14

## 2020-04-27 ENCOUNTER — READMISSION MANAGEMENT (OUTPATIENT)
Dept: CALL CENTER | Facility: HOSPITAL | Age: 85
End: 2020-04-27

## 2020-04-27 NOTE — OUTREACH NOTE
Prep Survey      Responses   [facility] facility patient discharged from?  [place]   Is LACE score < 7 ?  No   Eligibility  Readm Mgmt   Discharge diagnosis  viral PNA, rhinovirus, Hx chronic CHF   COVID-19 Test Status  Negative   Does the patient have one of the following disease processes/diagnoses(primary or secondary)?  COPD/Pneumonia   Does the patient have Home health ordered?  Yes   What is the Home health agency?   [facility]   Is there a DME ordered?  No   General alerts for this patient  [facility]   Prep survey completed?  Yes          Oksana Berry RN

## 2020-04-27 NOTE — OUTREACH NOTE
COPD/PN Week 1 Survey      Responses   [facility]   COVID-19 Test Status  Negative   Does the patient have one of the following disease processes/diagnoses(primary or secondary)?  COPD/Pneumonia   Is there a successful TCM telephone encounter documented?  No   Was the primary reason for admission:  Pneumonia   Week 1 attempt successful?  Yes   Call start time  1435   Call end time  1445   General alerts for this patient  [facility]   Discharge diagnosis  viral PNA, rhinovirus, Hx chronic CHF   Meds reviewed with patient/caregiver?  Yes   Is the patient having any side effects they believe may be caused by any medication additions or changes?  No   Does the patient have all medications ordered at discharge?  N/A   Is the patient taking all medications as directed (includes completed medication regime)?  Yes   Does the patient have a primary care provider?   Yes   Does the patient have an appointment with their PCP or pulmonologist within 7 days of discharge?  Greater than 7 days   What is preventing the patient from scheduling follow up appointments within 7 days of discharge?  Earlier appointment not available   Nursing Interventions  Verified appointment date/time/provider, Educated patient on importance of making appointment   Has the patient kept scheduled appointments due by today?  N/A   Comments  Not able to have telehealth visit, appt in June.  HH will be in.   What is the Home health agency?   [facility]    Has home health visited the patient within 72 hours of discharge?  Yes   Psychosocial issues?  No   Comments  BS not taken at home, does AIC at office.   Did the patient receive a copy of their discharge instructions?  Yes   Nursing interventions  Reviewed instructions with patient   What is the patient's perception of their health status since discharge?  Improving   Nursing Interventions  Nurse provided patient education   Are the patient's  immunizations up to date?   Yes   Nursing interventions  Educated on importance of maintaining up to date immunizations as advised by provider   Is the patient/caregiver able to teach back the hierarchy of who to call/visit for symptoms/problems? PCP, Specialist, Home health nurse, Urgent Care, ED, 911  Yes   Additional teach back comments  Encouraged am/pm temp checks, cough is worse today, soa is back to baseline. Energy level is improving.   Is the patient/caregiver able to teach back signs and symptoms of worsening condition:  Fever/chills, Shortness of breath, Chest pain   Is the patient/caregiver able to teach back importance of completing antibiotic course of treatment?  Yes   Week 1 call completed?  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          Tracy Ruff RN

## 2020-04-28 ENCOUNTER — READMISSION MANAGEMENT (OUTPATIENT)
Dept: CALL CENTER | Facility: HOSPITAL | Age: 85
End: 2020-04-28

## 2020-04-28 ENCOUNTER — ANTICOAGULATION VISIT (OUTPATIENT)
Dept: PHARMACY | Facility: HOSPITAL | Age: 85
End: 2020-04-28

## 2020-04-28 ENCOUNTER — APPOINTMENT (OUTPATIENT)
Dept: GENERAL RADIOLOGY | Facility: HOSPITAL | Age: 85
End: 2020-04-28

## 2020-04-28 ENCOUNTER — HOSPITAL ENCOUNTER (INPATIENT)
Facility: HOSPITAL | Age: 85
LOS: 3 days | Discharge: HOME HEALTH | End: 2020-05-02
Attending: EMERGENCY MEDICINE | Admitting: INTERNAL MEDICINE

## 2020-04-28 DIAGNOSIS — R06.02 SHORTNESS OF BREATH: Primary | ICD-10-CM

## 2020-04-28 DIAGNOSIS — E87.3 RESPIRATORY ALKALOSIS: ICD-10-CM

## 2020-04-28 DIAGNOSIS — D72.829 LEUKOCYTOSIS, UNSPECIFIED TYPE: ICD-10-CM

## 2020-04-28 DIAGNOSIS — J40 BRONCHITIS: ICD-10-CM

## 2020-04-28 DIAGNOSIS — I48.0 PAF (PAROXYSMAL ATRIAL FIBRILLATION) (HCC): ICD-10-CM

## 2020-04-28 LAB
ALBUMIN SERPL-MCNC: 4 G/DL (ref 3.5–5.2)
ALBUMIN/GLOB SERPL: 1.7 G/DL
ALP SERPL-CCNC: 75 U/L (ref 39–117)
ALT SERPL W P-5'-P-CCNC: 40 U/L (ref 1–33)
ANION GAP SERPL CALCULATED.3IONS-SCNC: 14.1 MMOL/L (ref 5–15)
ARTERIAL PATENCY WRIST A: POSITIVE
AST SERPL-CCNC: 24 U/L (ref 1–32)
ATMOSPHERIC PRESS: 748.6 MMHG
BACTERIA SPEC AEROBE CULT: NORMAL
BACTERIA SPEC AEROBE CULT: NORMAL
BASE EXCESS BLDA CALC-SCNC: 2.8 MMOL/L (ref 0–2)
BDY SITE: ABNORMAL
BILIRUB SERPL-MCNC: 1.1 MG/DL (ref 0.2–1.2)
BUN BLD-MCNC: 24 MG/DL (ref 8–23)
BUN/CREAT SERPL: 27.6 (ref 7–25)
CALCIUM SPEC-SCNC: 8.8 MG/DL (ref 8.6–10.5)
CHLORIDE SERPL-SCNC: 102 MMOL/L (ref 98–107)
CO2 SERPL-SCNC: 23.9 MMOL/L (ref 22–29)
CREAT BLD-MCNC: 0.87 MG/DL (ref 0.57–1)
DEPRECATED RDW RBC AUTO: 49.1 FL (ref 37–54)
ERYTHROCYTE [DISTWIDTH] IN BLOOD BY AUTOMATED COUNT: 13.8 % (ref 12.3–15.4)
GFR SERPL CREATININE-BSD FRML MDRD: 62 ML/MIN/1.73
GLOBULIN UR ELPH-MCNC: 2.4 GM/DL
GLUCOSE BLD-MCNC: 119 MG/DL (ref 65–99)
HCO3 BLDA-SCNC: 25.2 MMOL/L (ref 22–28)
HCT VFR BLD AUTO: 38.6 % (ref 34–46.6)
HGB BLD-MCNC: 12.9 G/DL (ref 12–15.9)
HOLD SPECIMEN: NORMAL
HOLD SPECIMEN: NORMAL
INR PPP: 1.99 (ref 0.9–1.1)
LYMPHOCYTES # BLD MANUAL: 10.48 10*3/MM3 (ref 0.7–3.1)
LYMPHOCYTES NFR BLD MANUAL: 1 % (ref 5–12)
LYMPHOCYTES NFR BLD MANUAL: 62 % (ref 19.6–45.3)
MCH RBC QN AUTO: 32.4 PG (ref 26.6–33)
MCHC RBC AUTO-ENTMCNC: 33.4 G/DL (ref 31.5–35.7)
MCV RBC AUTO: 97 FL (ref 79–97)
MODALITY: ABNORMAL
MONOCYTES # BLD AUTO: 0.17 10*3/MM3 (ref 0.1–0.9)
MYELOCYTES NFR BLD MANUAL: 1 % (ref 0–0)
NEUTROPHILS # BLD AUTO: 6.09 10*3/MM3 (ref 1.7–7)
NEUTROPHILS NFR BLD MANUAL: 36 % (ref 42.7–76)
NT-PROBNP SERPL-MCNC: 165.5 PG/ML (ref 5–1800)
PCO2 BLDA: 31 MM HG (ref 35–45)
PH BLDA: 7.52 PH UNITS (ref 7.35–7.45)
PLAT MORPH BLD: NORMAL
PLATELET # BLD AUTO: 189 10*3/MM3 (ref 140–450)
PMV BLD AUTO: 9.7 FL (ref 6–12)
PO2 BLDA: 68.7 MM HG (ref 80–100)
POTASSIUM BLD-SCNC: 3.4 MMOL/L (ref 3.5–5.2)
PROCALCITONIN SERPL-MCNC: 0.06 NG/ML (ref 0.1–0.25)
PROT SERPL-MCNC: 6.4 G/DL (ref 6–8.5)
PROTHROMBIN TIME: 22.3 SECONDS (ref 11.7–14.2)
RBC # BLD AUTO: 3.98 10*6/MM3 (ref 3.77–5.28)
RBC MORPH BLD: NORMAL
SAO2 % BLDCOA: 95.4 % (ref 92–99)
SMUDGE CELLS BLD QL SMEAR: ABNORMAL
SODIUM BLD-SCNC: 140 MMOL/L (ref 136–145)
TOTAL RATE: 22 BREATHS/MINUTE
TROPONIN T SERPL-MCNC: <0.01 NG/ML (ref 0–0.03)
WBC NRBC COR # BLD: 16.91 10*3/MM3 (ref 3.4–10.8)
WHOLE BLOOD HOLD SPECIMEN: NORMAL
WHOLE BLOOD HOLD SPECIMEN: NORMAL

## 2020-04-28 PROCEDURE — 84484 ASSAY OF TROPONIN QUANT: CPT | Performed by: EMERGENCY MEDICINE

## 2020-04-28 PROCEDURE — 71045 X-RAY EXAM CHEST 1 VIEW: CPT

## 2020-04-28 PROCEDURE — 85007 BL SMEAR W/DIFF WBC COUNT: CPT | Performed by: EMERGENCY MEDICINE

## 2020-04-28 PROCEDURE — 94640 AIRWAY INHALATION TREATMENT: CPT

## 2020-04-28 PROCEDURE — 85025 COMPLETE CBC W/AUTO DIFF WBC: CPT | Performed by: EMERGENCY MEDICINE

## 2020-04-28 PROCEDURE — 83880 ASSAY OF NATRIURETIC PEPTIDE: CPT | Performed by: EMERGENCY MEDICINE

## 2020-04-28 PROCEDURE — 85610 PROTHROMBIN TIME: CPT | Performed by: EMERGENCY MEDICINE

## 2020-04-28 PROCEDURE — 25010000002 METHYLPREDNISOLONE PER 125 MG: Performed by: INTERNAL MEDICINE

## 2020-04-28 PROCEDURE — G0378 HOSPITAL OBSERVATION PER HR: HCPCS

## 2020-04-28 PROCEDURE — 84145 PROCALCITONIN (PCT): CPT | Performed by: EMERGENCY MEDICINE

## 2020-04-28 PROCEDURE — 94799 UNLISTED PULMONARY SVC/PX: CPT

## 2020-04-28 PROCEDURE — 36600 WITHDRAWAL OF ARTERIAL BLOOD: CPT

## 2020-04-28 PROCEDURE — 93010 ELECTROCARDIOGRAM REPORT: CPT | Performed by: INTERNAL MEDICINE

## 2020-04-28 PROCEDURE — 99285 EMERGENCY DEPT VISIT HI MDM: CPT

## 2020-04-28 PROCEDURE — 82803 BLOOD GASES ANY COMBINATION: CPT

## 2020-04-28 PROCEDURE — 93005 ELECTROCARDIOGRAM TRACING: CPT | Performed by: EMERGENCY MEDICINE

## 2020-04-28 PROCEDURE — 80053 COMPREHEN METABOLIC PANEL: CPT | Performed by: EMERGENCY MEDICINE

## 2020-04-28 RX ORDER — OXAZEPAM 10 MG
10 CAPSULE ORAL NIGHTLY
Status: DISCONTINUED | OUTPATIENT
Start: 2020-04-28 | End: 2020-05-02 | Stop reason: HOSPADM

## 2020-04-28 RX ORDER — OXYBUTYNIN CHLORIDE 10 MG/1
10 TABLET, EXTENDED RELEASE ORAL NIGHTLY
Status: DISCONTINUED | OUTPATIENT
Start: 2020-04-28 | End: 2020-05-02 | Stop reason: HOSPADM

## 2020-04-28 RX ORDER — ROSUVASTATIN CALCIUM 20 MG/1
20 TABLET, COATED ORAL NIGHTLY
Status: DISCONTINUED | OUTPATIENT
Start: 2020-04-28 | End: 2020-05-02 | Stop reason: HOSPADM

## 2020-04-28 RX ORDER — FUROSEMIDE 20 MG/1
20 TABLET ORAL EVERY MORNING
Status: DISCONTINUED | OUTPATIENT
Start: 2020-04-29 | End: 2020-05-02 | Stop reason: HOSPADM

## 2020-04-28 RX ORDER — GLIPIZIDE 5 MG/1
2.5 TABLET ORAL EVERY MORNING
Status: DISCONTINUED | OUTPATIENT
Start: 2020-04-29 | End: 2020-05-02 | Stop reason: HOSPADM

## 2020-04-28 RX ORDER — SODIUM CHLORIDE 0.9 % (FLUSH) 0.9 %
10 SYRINGE (ML) INJECTION AS NEEDED
Status: DISCONTINUED | OUTPATIENT
Start: 2020-04-28 | End: 2020-05-02 | Stop reason: HOSPADM

## 2020-04-28 RX ORDER — METHYLPREDNISOLONE SODIUM SUCCINATE 40 MG/ML
40 INJECTION, POWDER, LYOPHILIZED, FOR SOLUTION INTRAMUSCULAR; INTRAVENOUS 2 TIMES DAILY
Status: COMPLETED | OUTPATIENT
Start: 2020-04-29 | End: 2020-04-29

## 2020-04-28 RX ORDER — CEPHALEXIN 500 MG/1
500 CAPSULE ORAL 3 TIMES DAILY
COMMUNITY
End: 2020-08-31 | Stop reason: HOSPADM

## 2020-04-28 RX ORDER — ASPIRIN 81 MG/1
81 TABLET ORAL DAILY
Status: DISCONTINUED | OUTPATIENT
Start: 2020-04-29 | End: 2020-05-02 | Stop reason: HOSPADM

## 2020-04-28 RX ORDER — IPRATROPIUM BROMIDE AND ALBUTEROL SULFATE 2.5; .5 MG/3ML; MG/3ML
3 SOLUTION RESPIRATORY (INHALATION)
Status: DISCONTINUED | OUTPATIENT
Start: 2020-04-28 | End: 2020-04-30

## 2020-04-28 RX ORDER — LOSARTAN POTASSIUM 25 MG/1
25 TABLET ORAL DAILY
Status: DISCONTINUED | OUTPATIENT
Start: 2020-04-28 | End: 2020-05-02 | Stop reason: HOSPADM

## 2020-04-28 RX ORDER — MONTELUKAST SODIUM 10 MG/1
10 TABLET ORAL NIGHTLY
Status: DISCONTINUED | OUTPATIENT
Start: 2020-04-28 | End: 2020-05-02 | Stop reason: HOSPADM

## 2020-04-28 RX ORDER — IPRATROPIUM BROMIDE AND ALBUTEROL SULFATE 2.5; .5 MG/3ML; MG/3ML
3 SOLUTION RESPIRATORY (INHALATION)
Status: DISCONTINUED | OUTPATIENT
Start: 2020-04-28 | End: 2020-05-02 | Stop reason: HOSPADM

## 2020-04-28 RX ORDER — METHYLPREDNISOLONE SODIUM SUCCINATE 125 MG/2ML
125 INJECTION, POWDER, LYOPHILIZED, FOR SOLUTION INTRAMUSCULAR; INTRAVENOUS ONCE
Status: COMPLETED | OUTPATIENT
Start: 2020-04-28 | End: 2020-04-28

## 2020-04-28 RX ORDER — WARFARIN SODIUM 3 MG/1
3 TABLET ORAL
Status: DISCONTINUED | OUTPATIENT
Start: 2020-04-28 | End: 2020-05-01

## 2020-04-28 RX ADMIN — IPRATROPIUM BROMIDE AND ALBUTEROL SULFATE 3 ML: 2.5; .5 SOLUTION RESPIRATORY (INHALATION) at 22:33

## 2020-04-28 RX ADMIN — OXYBUTYNIN CHLORIDE 10 MG: 10 TABLET, EXTENDED RELEASE ORAL at 22:17

## 2020-04-28 RX ADMIN — METHYLPREDNISOLONE SODIUM SUCCINATE 125 MG: 125 INJECTION, POWDER, FOR SOLUTION INTRAMUSCULAR; INTRAVENOUS at 22:18

## 2020-04-28 RX ADMIN — WARFARIN 3 MG: 3 TABLET ORAL at 22:18

## 2020-04-28 RX ADMIN — ROSUVASTATIN CALCIUM 20 MG: 20 TABLET, FILM COATED ORAL at 22:17

## 2020-04-28 RX ADMIN — LOSARTAN POTASSIUM 25 MG: 25 TABLET, FILM COATED ORAL at 22:17

## 2020-04-28 RX ADMIN — OXAZEPAM 10 MG: 10 CAPSULE, GELATIN COATED ORAL at 22:17

## 2020-04-28 RX ADMIN — IPRATROPIUM BROMIDE AND ALBUTEROL SULFATE 3 ML: 2.5; .5 SOLUTION RESPIRATORY (INHALATION) at 19:56

## 2020-04-28 RX ADMIN — MONTELUKAST SODIUM 10 MG: 10 TABLET, FILM COATED ORAL at 22:17

## 2020-04-28 NOTE — PROGRESS NOTES
Anticoagulation Clinic Progress Note    Anticoagulation Summary  As of 2020    INR goal:   2.0-3.0   TTR:   73.3 % (1.5 y)   INR used for dosin.92! (2020)   Warfarin maintenance plan:   2 mg every Tue, Thu, Sat; 4 mg all other days   Weekly warfarin total:   22 mg   No change documented:   Alexander [facility]iente RPH   Plan last modified:   Poa Levi RPH (3/11/2020)   Next INR check:   2020   Priority:   Maintenance   Target end date:   Indefinite    Indications    PAF (paroxysmal atrial fibrillation) (CMS/[facility]) [I48.0]             Anticoagulation Episode Summary     INR check location:       Preferred lab:       Send INR reminders to:   [facility] [place]    Comments:   [facility] Home lab beginning 20      Anticoagulation Care Providers     Provider Role Specialty Phone number    Rommel Veliz MD Referring Cardiology [phone]          Clinic Interview:  Patient Findings     Positives:   Other complaints    Negatives:   Signs/symptoms of thrombosis, Signs/symptoms of bleeding,   Laboratory test error suspected, Change in health, Change in alcohol use,   Change in activity, Upcoming invasive procedure, Emergency department   visit, Upcoming dental procedure, Missed doses, Extra doses, Change in   medications, Change in diet/appetite, Hospital admission, Bruising    Comments:   [facility] indicates awaiting finalized HH orders, but expect INR   check before end of wk.      Clinical Outcomes     Negatives:   Major bleeding event, Thromboembolic event,   Anticoagulation-related hospital admission, Anticoagulation-related ED   visit, Anticoagulation-related fatality    Comments:   [facility] indicates awaiting finalized HH orders, but expect INR   check before end of wk.        INR History:  Anticoagulation Monitoring 2020   INR 3.5 3.20 1.92   INR Date 2020   INR Goal 2.0-3.0 2.0-3.0 2.0-3.0   Trend Same Same Same   Last Week Total 22 mg 22 mg 20 mg    Next Week Total 20 mg 20 mg 22 mg   Sun 4 mg 4 mg -   Mon 4 mg - -   Tue 2 mg - 2 mg   Wed 2 mg (4/1); Otherwise 4 mg 2 mg (4/22) 4 mg   Thu 2 mg 2 mg 2 mg   Fri 4 mg 4 mg -   Sat 2 mg 2 mg -   Visit Report - - -   Some recent data might be hidden       Plan:  1. INR is Subtherapeutic today- see above in Anticoagulation Summary.   Will instruct Caitlyn Longoria to Continue their warfarin regimen- see above in Anticoagulation Summary.  2. Follow up by the end of the week w/ [facility] once paperwork goes through  3. They have been instructed to call if any changes in medications, doses, concerns, etc. Patient expresses understanding and has no further questions at this time.    [facility]

## 2020-04-28 NOTE — OUTREACH NOTE
COPD/PN Week 1 Survey      Responses   [facility] patient discharged from?  [place]   COVID-19 Test Status  Negative   Does the patient have one of the following disease processes/diagnoses(primary or secondary)?  COPD/Pneumonia   Is there a successful TCM telephone encounter documented?  No   Was the primary reason for admission:  Pneumonia   Week 1 attempt successful?  No [PATIENT IN THE ED AT THE TIME OF THE CALL]   Unsuccessful attempts  Attempt 1          Loyda Casarez LPN

## 2020-04-29 LAB
INR PPP: 2.08 (ref 0.9–1.1)
PROTHROMBIN TIME: 23.1 SECONDS (ref 11.7–14.2)

## 2020-04-29 PROCEDURE — 94799 UNLISTED PULMONARY SVC/PX: CPT

## 2020-04-29 PROCEDURE — 25010000002 METHYLPREDNISOLONE PER 40 MG: Performed by: INTERNAL MEDICINE

## 2020-04-29 PROCEDURE — 36415 COLL VENOUS BLD VENIPUNCTURE: CPT | Performed by: INTERNAL MEDICINE

## 2020-04-29 PROCEDURE — 85610 PROTHROMBIN TIME: CPT | Performed by: INTERNAL MEDICINE

## 2020-04-29 PROCEDURE — 94618 PULMONARY STRESS TESTING: CPT

## 2020-04-29 RX ORDER — POTASSIUM CHLORIDE 750 MG/1
40 CAPSULE, EXTENDED RELEASE ORAL ONCE
Status: COMPLETED | OUTPATIENT
Start: 2020-04-29 | End: 2020-04-29

## 2020-04-29 RX ORDER — PREDNISONE 20 MG/1
40 TABLET ORAL
Status: DISCONTINUED | OUTPATIENT
Start: 2020-04-30 | End: 2020-05-02 | Stop reason: HOSPADM

## 2020-04-29 RX ADMIN — POTASSIUM CHLORIDE 40 MEQ: 10 CAPSULE, COATED, EXTENDED RELEASE ORAL at 17:24

## 2020-04-29 RX ADMIN — METHYLPREDNISOLONE SODIUM SUCCINATE 40 MG: 40 INJECTION, POWDER, FOR SOLUTION INTRAMUSCULAR; INTRAVENOUS at 20:28

## 2020-04-29 RX ADMIN — FUROSEMIDE 20 MG: 20 TABLET ORAL at 06:15

## 2020-04-29 RX ADMIN — WARFARIN 3 MG: 3 TABLET ORAL at 17:25

## 2020-04-29 RX ADMIN — IPRATROPIUM BROMIDE AND ALBUTEROL SULFATE 3 ML: 2.5; .5 SOLUTION RESPIRATORY (INHALATION) at 11:34

## 2020-04-29 RX ADMIN — IPRATROPIUM BROMIDE AND ALBUTEROL SULFATE 3 ML: 2.5; .5 SOLUTION RESPIRATORY (INHALATION) at 15:52

## 2020-04-29 RX ADMIN — MONTELUKAST SODIUM 10 MG: 10 TABLET, FILM COATED ORAL at 20:28

## 2020-04-29 RX ADMIN — IPRATROPIUM BROMIDE AND ALBUTEROL SULFATE 3 ML: 2.5; .5 SOLUTION RESPIRATORY (INHALATION) at 08:25

## 2020-04-29 RX ADMIN — ASPIRIN 81 MG: 81 TABLET, COATED ORAL at 08:34

## 2020-04-29 RX ADMIN — ROSUVASTATIN CALCIUM 20 MG: 20 TABLET, FILM COATED ORAL at 20:28

## 2020-04-29 RX ADMIN — GLIPIZIDE 2.5 MG: 5 TABLET ORAL at 06:15

## 2020-04-29 RX ADMIN — SODIUM CHLORIDE, PRESERVATIVE FREE 10 ML: 5 INJECTION INTRAVENOUS at 20:29

## 2020-04-29 RX ADMIN — METHYLPREDNISOLONE SODIUM SUCCINATE 40 MG: 40 INJECTION, POWDER, FOR SOLUTION INTRAMUSCULAR; INTRAVENOUS at 08:34

## 2020-04-29 RX ADMIN — IPRATROPIUM BROMIDE AND ALBUTEROL SULFATE 3 ML: 2.5; .5 SOLUTION RESPIRATORY (INHALATION) at 19:40

## 2020-04-29 RX ADMIN — IPRATROPIUM BROMIDE AND ALBUTEROL SULFATE 3 ML: 2.5; .5 SOLUTION RESPIRATORY (INHALATION) at 23:18

## 2020-04-29 RX ADMIN — OXAZEPAM 10 MG: 10 CAPSULE, GELATIN COATED ORAL at 20:28

## 2020-04-29 RX ADMIN — OXYBUTYNIN CHLORIDE 10 MG: 10 TABLET, EXTENDED RELEASE ORAL at 20:28

## 2020-04-29 NOTE — OUTREACH NOTE
COPD/PN Week 1 Survey      Responses   [facility] patient discharged from?  [place]   COVID-19 Test Status  Negative   Does the patient have one of the following disease processes/diagnoses(primary or secondary)?  COPD/Pneumonia   Is there a successful TCM telephone encounter documented?  No   Week 1 attempt successful?  No   Revoke  Readmitted          Angie Elizabeth RN

## 2020-04-30 LAB
ANION GAP SERPL CALCULATED.3IONS-SCNC: 15 MMOL/L (ref 5–15)
BUN BLD-MCNC: 21 MG/DL (ref 8–23)
BUN/CREAT SERPL: 24.1 (ref 7–25)
CALCIUM SPEC-SCNC: 8.3 MG/DL (ref 8.6–10.5)
CHLORIDE SERPL-SCNC: 101 MMOL/L (ref 98–107)
CO2 SERPL-SCNC: 22 MMOL/L (ref 22–29)
CREAT BLD-MCNC: 0.87 MG/DL (ref 0.57–1)
DEPRECATED RDW RBC AUTO: 50.8 FL (ref 37–54)
ERYTHROCYTE [DISTWIDTH] IN BLOOD BY AUTOMATED COUNT: 13.9 % (ref 12.3–15.4)
GFR SERPL CREATININE-BSD FRML MDRD: 62 ML/MIN/1.73
GLUCOSE BLD-MCNC: 331 MG/DL (ref 65–99)
HCT VFR BLD AUTO: 38.6 % (ref 34–46.6)
HGB BLD-MCNC: 12.6 G/DL (ref 12–15.9)
INR PPP: 2.64 (ref 0.9–1.1)
MCH RBC QN AUTO: 32.5 PG (ref 26.6–33)
MCHC RBC AUTO-ENTMCNC: 32.6 G/DL (ref 31.5–35.7)
MCV RBC AUTO: 99.5 FL (ref 79–97)
PLATELET # BLD AUTO: 174 10*3/MM3 (ref 140–450)
PMV BLD AUTO: 10.2 FL (ref 6–12)
POTASSIUM BLD-SCNC: 4.1 MMOL/L (ref 3.5–5.2)
PROTHROMBIN TIME: 27.9 SECONDS (ref 11.7–14.2)
RBC # BLD AUTO: 3.88 10*6/MM3 (ref 3.77–5.28)
SODIUM BLD-SCNC: 138 MMOL/L (ref 136–145)
WBC NRBC COR # BLD: 22.83 10*3/MM3 (ref 3.4–10.8)

## 2020-04-30 PROCEDURE — 97162 PT EVAL MOD COMPLEX 30 MIN: CPT

## 2020-04-30 PROCEDURE — 94664 DEMO&/EVAL PT USE INHALER: CPT

## 2020-04-30 PROCEDURE — 63710000001 PREDNISONE PER 1 MG: Performed by: INTERNAL MEDICINE

## 2020-04-30 PROCEDURE — 94799 UNLISTED PULMONARY SVC/PX: CPT

## 2020-04-30 PROCEDURE — 97110 THERAPEUTIC EXERCISES: CPT

## 2020-04-30 PROCEDURE — 85610 PROTHROMBIN TIME: CPT | Performed by: INTERNAL MEDICINE

## 2020-04-30 PROCEDURE — 80048 BASIC METABOLIC PNL TOTAL CA: CPT | Performed by: INTERNAL MEDICINE

## 2020-04-30 PROCEDURE — 85027 COMPLETE CBC AUTOMATED: CPT | Performed by: INTERNAL MEDICINE

## 2020-04-30 RX ORDER — IPRATROPIUM BROMIDE AND ALBUTEROL SULFATE 2.5; .5 MG/3ML; MG/3ML
3 SOLUTION RESPIRATORY (INHALATION)
Status: DISCONTINUED | OUTPATIENT
Start: 2020-04-30 | End: 2020-05-02 | Stop reason: HOSPADM

## 2020-04-30 RX ORDER — SODIUM CHLORIDE FOR INHALATION 7 %
4 VIAL, NEBULIZER (ML) INHALATION
Status: DISCONTINUED | OUTPATIENT
Start: 2020-04-30 | End: 2020-05-02 | Stop reason: HOSPADM

## 2020-04-30 RX ADMIN — ASPIRIN 81 MG: 81 TABLET, COATED ORAL at 08:39

## 2020-04-30 RX ADMIN — IPRATROPIUM BROMIDE AND ALBUTEROL SULFATE 3 ML: 2.5; .5 SOLUTION RESPIRATORY (INHALATION) at 14:59

## 2020-04-30 RX ADMIN — MONTELUKAST SODIUM 10 MG: 10 TABLET, FILM COATED ORAL at 20:56

## 2020-04-30 RX ADMIN — FUROSEMIDE 20 MG: 20 TABLET ORAL at 06:22

## 2020-04-30 RX ADMIN — GLIPIZIDE 2.5 MG: 5 TABLET ORAL at 06:23

## 2020-04-30 RX ADMIN — IPRATROPIUM BROMIDE AND ALBUTEROL SULFATE 3 ML: 2.5; .5 SOLUTION RESPIRATORY (INHALATION) at 19:43

## 2020-04-30 RX ADMIN — ROSUVASTATIN CALCIUM 20 MG: 20 TABLET, FILM COATED ORAL at 20:56

## 2020-04-30 RX ADMIN — IPRATROPIUM BROMIDE AND ALBUTEROL SULFATE 3 ML: 2.5; .5 SOLUTION RESPIRATORY (INHALATION) at 10:53

## 2020-04-30 RX ADMIN — SODIUM CHLORIDE SOLN NEBU 7% 4 ML: 7 NEBU SOLN at 19:43

## 2020-04-30 RX ADMIN — OXYBUTYNIN CHLORIDE 10 MG: 10 TABLET, EXTENDED RELEASE ORAL at 20:56

## 2020-04-30 RX ADMIN — WARFARIN 3 MG: 3 TABLET ORAL at 17:55

## 2020-04-30 RX ADMIN — LOSARTAN POTASSIUM 25 MG: 25 TABLET, FILM COATED ORAL at 16:02

## 2020-04-30 RX ADMIN — IPRATROPIUM BROMIDE AND ALBUTEROL SULFATE 3 ML: 2.5; .5 SOLUTION RESPIRATORY (INHALATION) at 07:01

## 2020-04-30 RX ADMIN — SODIUM CHLORIDE, PRESERVATIVE FREE 10 ML: 5 INJECTION INTRAVENOUS at 08:39

## 2020-04-30 RX ADMIN — PREDNISONE 40 MG: 20 TABLET ORAL at 08:39

## 2020-04-30 RX ADMIN — OXAZEPAM 10 MG: 10 CAPSULE, GELATIN COATED ORAL at 20:56

## 2020-05-01 LAB
INR PPP: 3.17 (ref 0.9–1.1)
PROTHROMBIN TIME: 32.3 SECONDS (ref 11.7–14.2)

## 2020-05-01 PROCEDURE — 94799 UNLISTED PULMONARY SVC/PX: CPT

## 2020-05-01 PROCEDURE — 63710000001 PREDNISONE PER 1 MG: Performed by: INTERNAL MEDICINE

## 2020-05-01 PROCEDURE — 85610 PROTHROMBIN TIME: CPT | Performed by: INTERNAL MEDICINE

## 2020-05-01 PROCEDURE — 36415 COLL VENOUS BLD VENIPUNCTURE: CPT | Performed by: INTERNAL MEDICINE

## 2020-05-01 RX ORDER — WARFARIN SODIUM 4 MG/1
4 TABLET ORAL
Status: DISCONTINUED | OUTPATIENT
Start: 2020-05-03 | End: 2020-05-02 | Stop reason: HOSPADM

## 2020-05-01 RX ORDER — WARFARIN SODIUM 2 MG/1
2 TABLET ORAL
Status: DISCONTINUED | OUTPATIENT
Start: 2020-05-02 | End: 2020-05-02 | Stop reason: HOSPADM

## 2020-05-01 RX ORDER — WARFARIN SODIUM 2 MG/1
2 TABLET ORAL
Status: COMPLETED | OUTPATIENT
Start: 2020-05-01 | End: 2020-05-01

## 2020-05-01 RX ADMIN — LOSARTAN POTASSIUM 25 MG: 25 TABLET, FILM COATED ORAL at 08:12

## 2020-05-01 RX ADMIN — GLIPIZIDE 2.5 MG: 5 TABLET ORAL at 06:44

## 2020-05-01 RX ADMIN — IPRATROPIUM BROMIDE AND ALBUTEROL SULFATE 3 ML: 2.5; .5 SOLUTION RESPIRATORY (INHALATION) at 15:43

## 2020-05-01 RX ADMIN — SODIUM CHLORIDE SOLN NEBU 7% 4 ML: 7 NEBU SOLN at 15:43

## 2020-05-01 RX ADMIN — ROSUVASTATIN CALCIUM 20 MG: 20 TABLET, FILM COATED ORAL at 23:09

## 2020-05-01 RX ADMIN — OXYBUTYNIN CHLORIDE 10 MG: 10 TABLET, EXTENDED RELEASE ORAL at 23:09

## 2020-05-01 RX ADMIN — IPRATROPIUM BROMIDE AND ALBUTEROL SULFATE 3 ML: 2.5; .5 SOLUTION RESPIRATORY (INHALATION) at 10:46

## 2020-05-01 RX ADMIN — WARFARIN 2 MG: 2 TABLET ORAL at 18:26

## 2020-05-01 RX ADMIN — IPRATROPIUM BROMIDE AND ALBUTEROL SULFATE 3 ML: 2.5; .5 SOLUTION RESPIRATORY (INHALATION) at 07:33

## 2020-05-01 RX ADMIN — FUROSEMIDE 20 MG: 20 TABLET ORAL at 06:44

## 2020-05-01 RX ADMIN — ASPIRIN 81 MG: 81 TABLET, COATED ORAL at 08:13

## 2020-05-01 RX ADMIN — SODIUM CHLORIDE SOLN NEBU 7% 4 ML: 7 NEBU SOLN at 19:46

## 2020-05-01 RX ADMIN — MONTELUKAST SODIUM 10 MG: 10 TABLET, FILM COATED ORAL at 23:09

## 2020-05-01 RX ADMIN — SODIUM CHLORIDE SOLN NEBU 7% 4 ML: 7 NEBU SOLN at 07:33

## 2020-05-01 RX ADMIN — PREDNISONE 40 MG: 20 TABLET ORAL at 08:13

## 2020-05-01 RX ADMIN — SODIUM CHLORIDE SOLN NEBU 7% 4 ML: 7 NEBU SOLN at 10:46

## 2020-05-01 RX ADMIN — IPRATROPIUM BROMIDE AND ALBUTEROL SULFATE 3 ML: 2.5; .5 SOLUTION RESPIRATORY (INHALATION) at 19:42

## 2020-05-01 NOTE — PROGRESS NOTES
Continued Stay Note  [facility]     Patient Name: Caitlyn Longoria  MRN: [id number]  Today's Date: 5/1/2020    Admit Date: 4/28/2020    Discharge Plan     Row Name 05/01/20 1619       Plan    Plan  Return back to [facility] with [facility]    Patient/Family in Agreement with Plan  -- attempted to call pt x2 into room, but pt phone busy    Plan Comments  Reviewed clinicals, pt no longer requiring oxygen. Plan is back to [facility] with [facility]. CCP to continue to follow for dc needs. yamileth avina/ccp        Discharge Codes    No documentation.       Expected Discharge Date and Time     Expected Discharge Date Expected Discharge Time    May 1, 2020             Lachelle Isidro, RN

## 2020-05-01 NOTE — PROGRESS NOTES
[facility]  [phone]  Brendan Stauffer MD    Subjective:  LOS: 2    Still with some secretions and cough but better, she nishant likes the flutter valve.    Objective   Vital Signs past 24hrs    Temp range: Temp (24hrs), Av.6 °F (36.4 °C), Min:97.3 °F (36.3 °C), Max:98 °F (36.7 °C)    BP range: BP: (108-136)/(47-93) 136/78  Pulse range: Heart Rate:  [80-92] 92  Resp rate range: Resp:  [17-19] 17    Device (Oxygen Therapy): room airFlow (L/min):  [2] 2  Oxygen range:SpO2:  [90 %-97 %] 92 %      97.4 kg (214 lb 12.8 oz); Body mass index is 38.05 kg/m².  No intake or output data in the 24 hours ending 20 1556    Physical Exam   Constitutional: She appears well-developed.   HENT:   Head: Normocephalic.   Eyes: Pupils are equal, round, and reactive to light.   Cardiovascular: Normal rate and regular rhythm.   No murmur heard.  Pulmonary/Chest: Effort normal. No respiratory distress. She has no wheezes. She has rhonchi (FEW SCATTERED). She has no rales.   Mild coarse breath sounds   Abdominal: Soft. Bowel sounds are normal. She exhibits no mass. There is no tenderness.   Musculoskeletal: She exhibits no edema.   Skin: No rash noted.     Results Review:    I have reviewed the laboratory and imaging data since the last note by [facility] physician.  My annotations are noted in assessment and plan.    Medication Review:  I have reviewed the current MAR.  My annotations are noted in assessment and plan.       Plan   PCCM Problems  Acute exacerbation asthma  Recent rhinovirus infection  Relevant Medical Diagnoses  A. fib with pacemaker  A. fib on anticoagulation  Diabetes mellitus type 2  CKD  Acute hypokalemia      Plan of Treatment  Now po prednisone. Wean as tolerated.    Rhonci due to secretions. Better with 7% saline nebs and flutter valve.    Continue AC for afib. Discussed with pharmacist.    No desat when she walks. Will not need O2.    Labs noted. WBC from steroids.    I updated Cissy Rajaydont.    Possibly  dc tomorrow. (Patient of Dr. Juno Coburn).    Brendan Stauffer MD  05/01/20  15:56    Part of this note may be an electronic transcription/translation of spoken language to printed text using the Dragon Dictation System.

## 2020-05-01 NOTE — PROGRESS NOTES
Continued Stay Note  [facility]     Patient Name: Caitlyn Longoria  MRN: [id number]  Today's Date: 5/1/2020    Admit Date: 4/28/2020    Discharge Plan     Row Name 05/01/20 1701       Plan    Plan  Return back to [facility] with [facility]- neice transporting    Patient/Family in Agreement with Plan  yes    Plan Comments  Spoke with pt via inbound call due to isolation. CCP introduced self and explained ccp role. Pt states she is only using o2 at night here because she did not bring her bipap. She states she already has a nebulizer also. Her neice will transport. Chio avina/ccp    Row Name 05/01/20 1619       Plan    Plan  Return back to [facility] with [facility]    Patient/Family in Agreement with Plan  -- attempted to call pt x2 into room, but pt phone busy    Plan Comments  Reviewed clinicals, pt no longer requiring oxygen. Plan is back to [facility] with [facility]. CCP to continue to follow for dc needs. chio avina/ccp        Discharge Codes    No documentation.       Expected Discharge Date and Time     Expected Discharge Date Expected Discharge Time    May 1, 2020             Lachelle Isidro RN

## 2020-05-01 NOTE — PLAN OF CARE
Problem: Patient Care Overview  Goal: Plan of Care Review  Outcome: Ongoing (interventions implemented as appropriate)  Flowsheets  Taken 5/1/2020 0632  Progress: improving  Outcome Summary: VSS w/ 2L NC at night. Denies SOB but continues to exhibit frequent wet-sounding productive cough with small amounts of thick, clear sputum. Expiratory wheezes and rhonchi heard all fields. C/o sore throat, dry nasal mucosa, and inability to expectorate sputum effectively. Ambulates SBA w/ walker. WBCs elevated. Will continue to monitor.  Taken 5/1/2020 0030  Plan of Care Reviewed With: patient

## 2020-05-02 ENCOUNTER — READMISSION MANAGEMENT (OUTPATIENT)
Dept: CALL CENTER | Facility: HOSPITAL | Age: 85
End: 2020-05-02

## 2020-05-02 VITALS
WEIGHT: 214.8 LBS | HEIGHT: 63 IN | TEMPERATURE: 98.2 F | SYSTOLIC BLOOD PRESSURE: 117 MMHG | BODY MASS INDEX: 38.06 KG/M2 | OXYGEN SATURATION: 92 % | DIASTOLIC BLOOD PRESSURE: 64 MMHG | RESPIRATION RATE: 20 BRPM | HEART RATE: 82 BPM

## 2020-05-02 PROBLEM — R06.02 SHORTNESS OF BREATH: Status: RESOLVED | Noted: 2020-04-28 | Resolved: 2020-05-02

## 2020-05-02 LAB
INR PPP: 3.33 (ref 0.9–1.1)
PROTHROMBIN TIME: 33.5 SECONDS (ref 11.7–14.2)

## 2020-05-02 PROCEDURE — 63710000001 PREDNISONE PER 1 MG: Performed by: INTERNAL MEDICINE

## 2020-05-02 PROCEDURE — 85610 PROTHROMBIN TIME: CPT | Performed by: INTERNAL MEDICINE

## 2020-05-02 PROCEDURE — 94799 UNLISTED PULMONARY SVC/PX: CPT

## 2020-05-02 RX ORDER — PREDNISONE 20 MG/1
20 TABLET ORAL 2 TIMES DAILY
Qty: 6 TABLET | Refills: 0 | Status: SHIPPED | OUTPATIENT
Start: 2020-05-02 | End: 2020-05-05

## 2020-05-02 RX ORDER — AMOXICILLIN 500 MG/1
2000 CAPSULE ORAL ONCE AS NEEDED
Qty: 4 CAPSULE | Refills: 0 | Status: ON HOLD | OUTPATIENT
Start: 2020-05-02 | End: 2020-08-29

## 2020-05-02 RX ORDER — IPRATROPIUM BROMIDE AND ALBUTEROL SULFATE 2.5; .5 MG/3ML; MG/3ML
3 SOLUTION RESPIRATORY (INHALATION)
Qty: 360 ML | Refills: 0 | Status: SHIPPED | OUTPATIENT
Start: 2020-05-02 | End: 2020-06-01

## 2020-05-02 RX ADMIN — PREDNISONE 40 MG: 20 TABLET ORAL at 08:04

## 2020-05-02 RX ADMIN — IPRATROPIUM BROMIDE AND ALBUTEROL SULFATE 3 ML: 2.5; .5 SOLUTION RESPIRATORY (INHALATION) at 15:48

## 2020-05-02 RX ADMIN — ASPIRIN 81 MG: 81 TABLET, COATED ORAL at 08:04

## 2020-05-02 RX ADMIN — GLIPIZIDE 2.5 MG: 5 TABLET ORAL at 07:18

## 2020-05-02 RX ADMIN — IPRATROPIUM BROMIDE AND ALBUTEROL SULFATE 3 ML: 2.5; .5 SOLUTION RESPIRATORY (INHALATION) at 11:58

## 2020-05-02 RX ADMIN — WARFARIN 2 MG: 2 TABLET ORAL at 18:53

## 2020-05-02 RX ADMIN — SODIUM CHLORIDE SOLN NEBU 7% 4 ML: 7 NEBU SOLN at 08:14

## 2020-05-02 RX ADMIN — OXAZEPAM 10 MG: 10 CAPSULE, GELATIN COATED ORAL at 00:21

## 2020-05-02 RX ADMIN — SODIUM CHLORIDE SOLN NEBU 7% 4 ML: 7 NEBU SOLN at 15:54

## 2020-05-02 RX ADMIN — FUROSEMIDE 20 MG: 20 TABLET ORAL at 07:17

## 2020-05-02 RX ADMIN — IPRATROPIUM BROMIDE AND ALBUTEROL SULFATE 3 ML: 2.5; .5 SOLUTION RESPIRATORY (INHALATION) at 08:09

## 2020-05-02 RX ADMIN — SODIUM CHLORIDE SOLN NEBU 7% 4 ML: 7 NEBU SOLN at 12:03

## 2020-05-02 RX ADMIN — LOSARTAN POTASSIUM 25 MG: 25 TABLET, FILM COATED ORAL at 08:04

## 2020-05-02 NOTE — DISCHARGE SUMMARY
[facility]    Date of admission: 4/28/2020  Discharge date: 5/2/2020    Admission/discharge diagnosis:  1. Asthma with acute exacerbation  2. Acute bronchitis due to rhinovirus  3. Restrictive lung disease  4. Paralyzed left hemidiaphragm  5. Mucous plugging  6. afib with pacemaker  7. DMII  8. CKD  9. Hypokalemia -- resolved    HPI: as per Dr. Stauffer: reviewed:  85-year-old female followed by Dr. Juno Coburn for asthma.  She is also under the care of an allergist and is on multiple medications.  She has chronic shortness of breath.  She was recently ruled out for a COVID infection.  She has chronic cough.  This morning she woke up with extreme shortness of breath.  She had only recently been discharged from the hospital on 4/26/2020 after treatment for viral pneumonia due to rhinovirus.  She took a breathing treatment with some improvement.  However she continued to feel very short of breath and then came into the ER.  Her blood gases okay but she is still looking tachypneic.  She is going to be admitted for management of asthma exacerbation.  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  Prior history of diabetes.  Patient denies kidney disease but she does have recorded chronic kidney disease.  She has hypertension and history of paroxysmal atrial fibrillation for which she has a pacemaker and is on warfarin.     Caitlyn Longoria  reports that she drank alcohol.,  reports that she has never smoked. She has never used smokeless tobacco.    Hospital Course:  Mrs. Longoria improved well. She has difficult with pulmonary toilet and flutter valve and nebulizer seemed to help her well.  Paralyzed diaphragm seems to impair her cough and pulmonary reserve.  She says she has been on farsenra as an outpatient for a while and she is going to continue on this.  She is on trelegy at home and we should probably check on her ability to have good delievery with that device when she  follows up in the office. She will continue for now and will continue with duonebs and flutter valve.  She will go home on a steroid taper as well.      Dispo: independent living    Activity: pre admission    Diet: pre admission    Follow up:  Dr. Coburn about 2 weeks  Dr. Amarilis Suarez about 4 weeks    Greater than 30 mins spent in discharging patient home       Discharge Medications      New Medications      Instructions Start Date   amoxicillin 500 MG capsule  Commonly known as:  AMOXIL   2,000 mg, Oral, Once As Needed      ipratropium-albuterol 0.5-2.5 mg/3 ml nebulizer  Commonly known as:  DUO-NEB   3 mL, Nebulization, Every 2 Hours PRN      predniSONE 20 MG tablet  Commonly known as:  DELTASONE   20 mg, Oral, 2 Times Daily         Continue These Medications      Instructions Start Date   acetaminophen 650 MG 8 hr tablet  Commonly known as:  TYLENOL   650 mg, Oral, 2 Times Daily      Acetylcysteine 500 MG capsule   1,000 mg, Oral, Daily With Breakfast & Dinner      albuterol (2.5 MG/3ML) 0.083% nebulizer solution  Commonly known as:  PROVENTIL   2.5 mg, Nebulization, Every 4 Hours PRN      albuterol sulfate  (90 Base) MCG/ACT inhaler  Commonly known as:  PROVENTIL HFA;VENTOLIN HFA;PROAIR HFA   2 puffs, Inhalation, Every 4 Hours PRN      aspirin 81 MG tablet   81 mg, Oral, Daily      BIOFREEZE ROLL-ON EX   Apply externally      carvedilol 3.125 MG tablet  Commonly known as:  COREG   3.125 mg, Oral, 2 Times Daily      cephalexin 500 MG capsule  Commonly known as:  KEFLEX   500 mg, Oral, 3 Times Daily      cetirizine 10 MG tablet  Commonly known as:  zyrTEC   10 mg, Oral, 2 Times Daily      Dupixent 300 MG/2ML solution prefilled syringe  Generic drug:  Dupilumab   Subcutaneous, evr other week, due this Friday 02/14/2020      FASENRA SC   Subcutaneous, Gets one shot a month, next shot may 13 then one every 8 weeks       fluticasone 50 MCG/ACT nasal spray  Commonly known as:  FLONASE   1 spray, Nasal, 2 Times  Daily      furosemide 20 MG tablet  Commonly known as:  LASIX   20 mg, Oral, Every Morning      glipizide 5 MG tablet  Commonly known as:  GLUCOTROL   2.5 mg, Oral, Every Morning      ketoconazole 2 % cream  Commonly known as:  NIZORAL   Topical, Daily      losartan 25 MG tablet  Commonly known as:  COZAAR   TAKE ONE TABLET BY MOUTH DAILY      Lubricating Eye Drops 0.4-0.3 % solution ophthalmic solution  Generic drug:  polyethyl glycol-propyl glycol   1 drop, Both Eyes, Every 1 Hour PRN      montelukast 10 MG tablet  Commonly known as:  SINGULAIR   10 mg, Oral, Nightly      oxazepam 10 MG capsule  Commonly known as:  SERAX   10 mg, Oral, Nightly      oxybutynin XL 10 MG 24 hr tablet  Commonly known as:  DITROPAN-XL   10 mg, Oral, Every Night at Bedtime      rosuvastatin 20 MG tablet  Commonly known as:  CRESTOR   20 mg, Oral, Nightly      TRELEGY ELLIPTA IN   Inhalation, Daily, As directed      warfarin 4 MG tablet  Commonly known as:  COUMADIN   Take one-half tablet (2 mg) by mouth Tues, Thurs, and Sat, and take one tablet (4 mg) by mouth all other days or as directed.

## 2020-05-03 ENCOUNTER — READMISSION MANAGEMENT (OUTPATIENT)
Dept: CALL CENTER | Facility: HOSPITAL | Age: 85
End: 2020-05-03

## 2020-05-03 NOTE — OUTREACH NOTE
Prep Survey      Responses   [facility] facility patient discharged from?  [place]   Is LACE score < 7 ?  No   Eligibility  Readm Mgmt   Discharge diagnosis  Asthma with acute exacerbation   COVID-19 Test Status  Negative   Does the patient have one of the following disease processes/diagnoses(primary or secondary)?  Other   Does the patient have Home health ordered?  Yes   What is the Home health agency?    [facility]    Is there a DME ordered?  No   Prep survey completed?  Yes          Angie Elizabeth RN

## 2020-05-03 NOTE — OUTREACH NOTE
Medical Week 1 Survey      Responses   [facility]   COVID-19 Test Status  Negative   Does the patient have one of the following disease processes/diagnoses(primary or secondary)?  Other   Is there a successful TCM telephone encounter documented?  No   Week 1 attempt successful?  Yes   Call start time  1541   Call end time  1551   General alerts for this patient  [facility]   Discharge diagnosis  Asthma with acute exacerbation   Meds reviewed with patient/caregiver?  Yes   Is the patient having any side effects they believe may be caused by any medication additions or changes?  No   Does the patient have all medications ordered at discharge?  No   What is keeping the patient from filling the prescriptions?  Script on hold per patient   Nursing Interventions  No intervention needed   Prescription comments  Patient states she got her nebulizer solution refilled and they will not refill the new prescription so she is going to discuss this with her PCP tomorrow   Is the patient taking all medications as directed (includes completed medication regime)?  Yes   Does the patient have a primary care provider?   Yes   Does the patient have an appointment with their PCP within 7 days of discharge?  No   What is preventing the patient from scheduling follow up appointments within 7 days of discharge?  Haven't had time   Nursing Interventions  Educated patient on importance of making appointment, Advised patient to make appointment   Has the patient kept scheduled appointments due by today?  N/A   What is the Home health agency?    [facility]   Has home health visited the patient within 72 hours of discharge?  Call prior to 72 hours   Pulse Ox monitoring  Intermittent   Pulse Ox device source  Patient   O2 Sat comments  States she hasn't checked today but will check it   O2 Sat: education provided  Sat levels, Monitoring frequency   Did the patient receive a copy of their  discharge instructions?  Yes   Nursing interventions  Reviewed instructions with patient   What is the patient's perception of their health status since discharge?  Improving   Is the patient/caregiver able to teach back signs and symptoms related to disease process for when to call PCP?  Yes   Is the patient/caregiver able to teach back signs and symptoms related to disease process for when to call 911?  Yes   Is the patient/caregiver able to teach back the hierarchy of who to call/visit for symptoms/problems? PCP, Specialist, Home health nurse, Urgent Care, ED, 911  Yes   Additional teach back comments  Patient states she will check temp and bp later.  She states she thinks she did too much this morning and is a little tired.  She has some wheezing but using nebulizer.  States she feels like she is improving.  She is quarantined due to being in the hospital per the policies of the independant living facility.  She is to have her INR checked also this week.     Week 1 call completed?  Yes   Wrap up additional comments  No questions or needs at this time          Donna Martinez LPN

## 2020-05-04 ENCOUNTER — ANTICOAGULATION VISIT (OUTPATIENT)
Dept: PHARMACY | Facility: HOSPITAL | Age: 85
End: 2020-05-04

## 2020-05-04 ENCOUNTER — READMISSION MANAGEMENT (OUTPATIENT)
Dept: CALL CENTER | Facility: HOSPITAL | Age: 85
End: 2020-05-04

## 2020-05-04 DIAGNOSIS — I48.91 ATRIAL FIBRILLATION AND FLUTTER (HCC): ICD-10-CM

## 2020-05-04 DIAGNOSIS — I48.92 ATRIAL FIBRILLATION AND FLUTTER (HCC): ICD-10-CM

## 2020-05-04 DIAGNOSIS — I48.0 PAF (PAROXYSMAL ATRIAL FIBRILLATION) (HCC): ICD-10-CM

## 2020-05-04 LAB — INR PPP: 3.6

## 2020-05-04 RX ORDER — CARVEDILOL 3.12 MG/1
TABLET ORAL
Qty: 180 TABLET | Refills: 1 | Status: SHIPPED | OUTPATIENT
Start: 2020-05-04 | End: 2020-12-08

## 2020-05-04 NOTE — PROGRESS NOTES
Anticoagulation Clinic Progress Note    Anticoagulation Summary  As of 5/4/2020    INR goal:   2.0-3.0   TTR:   73.3 % (1.6 y)   INR used for dosing:   3.60! (5/4/2020)   Warfarin maintenance plan:   2 mg every Tue, Thu, Sat; 4 mg all other days   Weekly warfarin total:   22 mg   Plan last modified:   Saleem Redmond, [facility] (5/4/2020)   Next INR check:   5/7/2020   Priority:   Maintenance   Target end date:   Indefinite    Indications    PAF (paroxysmal atrial fibrillation) (CMS/HCC) [I48.0]             Anticoagulation Episode Summary     INR check location:       Preferred lab:       Send INR reminders to:   [facility] CLINICAL POOL    Comments:   [facility] beginning 4/22/20      Anticoagulation Care Providers     Provider Role Specialty Phone number    Rmomel Veliz MD Referring Cardiology [phone]          Clinic Interview:  Patient Findings     Negatives:   Signs/symptoms of thrombosis, Signs/symptoms of bleeding,   Laboratory test error suspected, Change in health, Change in alcohol use,   Change in activity, Upcoming invasive procedure, Emergency department   visit, Upcoming dental procedure, Missed doses, Extra doses, Change in   medications, Change in diet/appetite, Hospital admission, Bruising, Other   complaints      Clinical Outcomes     Negatives:   Major bleeding event, Thromboembolic event,   Anticoagulation-related hospital admission, Anticoagulation-related ED   visit, Anticoagulation-related fatality        INR History:  Anticoagulation Monitoring 4/22/2020 4/28/2020 5/4/2020   INR 3.20 1.92 3.60   INR Date 4/22/2020 4/26/2020 5/4/2020   INR Goal 2.0-3.0 2.0-3.0 2.0-3.0   Trend Same Same Same   Last Week Total 22 mg 20 mg 20 mg   Next Week Total 20 mg 22 mg 20 mg   Sun 4 mg - -   Mon - - 2 mg (5/4)   Tue - 2 mg 2 mg   Wed 2 mg (4/22) 4 mg 4 mg   Thu 2 mg 2 mg -   Fri 4 mg - -   Sat 2 mg - -   Visit Report - - -   Some recent data might be hidden       Plan:  1. INR is  Supratherapeutic today- see above in Anticoagulation Summary.   Will instruct Caitlyn Longoria to decrease their warfarin regimen and only give 2 mg today; prednisone is likely causing the patient's INR to be higher- see above in Anticoagulation Summary.  2. Follow up in 3 days  3.  Pt has agreed to only be called if INR out of range. They have been instructed to call if any changes in medications, doses, concerns, etc. Patient expresses understanding and has no further questions at this time.    Saleem Redmond [facility]

## 2020-05-04 NOTE — PROGRESS NOTES
Case Management Discharge Note      Final Note: Home with [facility]. yamileth rn/ccp    Provided Post Acute Provider List?: Refused  Refused Provider List Comment: Pt current with [facility], resides at [facility] and would go there if skilled    Destination      No service has been selected for the patient.      Durable Medical Equipment      No service has been selected for the patient.      Dialysis/Infusion      No service has been selected for the patient.      Home Medical Care - Selection Complete      Service Provider Request Status Selected Services Address Phone Number Fax Number    [facility] Selected Home Health Services [address] [address] [phone]      Therapy      No service has been selected for the patient.      Community Resources      No service has been selected for the patient.             Final Discharge Disposition Code: 06 - home with home health care

## 2020-05-04 NOTE — OUTREACH NOTE
Medical Week 1 Survey      Responses   [facility] patient discharged from?  [place]   COVID-19 Test Status  Negative   Does the patient have one of the following disease processes/diagnoses(primary or secondary)?  Other   Is there a successful TCM telephone encounter documented?  No   Week 1 attempt successful?  No   Unsuccessful attempts  Attempt 1          Loyda Casarez LPN

## 2020-05-05 ENCOUNTER — READMISSION MANAGEMENT (OUTPATIENT)
Dept: CALL CENTER | Facility: HOSPITAL | Age: 85
End: 2020-05-05

## 2020-05-05 NOTE — OUTREACH NOTE
Medical Week 1 Survey      Responses   [facility] patient discharged from?  [place]   COVID-19 Test Status  Negative   Does the patient have one of the following disease processes/diagnoses(primary or secondary)?  Other   Is there a successful TCM telephone encounter documented?  No   Week 1 attempt successful?  No   Call start time  1719   Unsuccessful attempts  Attempt 2   General alerts for this patient  [facility]   Discharge diagnosis  Asthma with acute exacerbation          Tracy Ruff RN

## 2020-05-07 ENCOUNTER — ANTICOAGULATION VISIT (OUTPATIENT)
Dept: PHARMACY | Facility: HOSPITAL | Age: 85
End: 2020-05-07

## 2020-05-07 DIAGNOSIS — I48.0 PAF (PAROXYSMAL ATRIAL FIBRILLATION) (HCC): ICD-10-CM

## 2020-05-07 LAB — INR PPP: 3.5

## 2020-05-07 NOTE — PROGRESS NOTES
Anticoagulation Clinic Progress Note    Anticoagulation Summary  As of 5/7/2020    INR goal:   2.0-3.0   TTR:   72.9 % (1.6 y)   INR used for dosing:   3.50! (5/7/2020)   Warfarin maintenance plan:   4 mg every Mon, Wed, Fri; 2 mg all other days   Weekly warfarin total:   20 mg   Plan last modified:   Alexander Valiente RPH (5/7/2020)   Next INR check:   5/11/2020   Priority:   Maintenance   Target end date:   Indefinite    Indications    PAF (paroxysmal atrial fibrillation) (CMS/HCC) [I48.0]             Anticoagulation Episode Summary     INR check location:       Preferred lab:       Send INR reminders to:   [facility] CLINICAL POOL    Comments:   [facility] beginning 4/22/20      Anticoagulation Care Providers     Provider Role Specialty Phone number    Rommel Veliz MD Referring Cardiology [phone]          INR History:  Anticoagulation Monitoring 4/28/2020 5/4/2020 5/7/2020   INR 1.92 3.60 3.50   INR Date 4/26/2020 5/4/2020 5/7/2020   INR Goal 2.0-3.0 2.0-3.0 2.0-3.0   Trend Same Same Down   Last Week Total 20 mg 20 mg 18 mg   Next Week Total 22 mg 20 mg 18 mg   Sun - - 2 mg   Mon - 2 mg (5/4) -   Tue 2 mg 2 mg -   Wed 4 mg 4 mg -   Thu 2 mg - Hold (5/7)   Fri - - 4 mg   Sat - - 2 mg   Visit Report - - -   Some recent data might be hidden       Plan:  1. INR is Supratherapeutic today- see above in Anticoagulation Summary.   Provided instructions to Maria Alejandra with [facility] to Change their warfarin regimen- see above in Anticoagulation Summary.  2. Follow up in 4 days      Alexander Valiente RPH

## 2020-05-08 ENCOUNTER — READMISSION MANAGEMENT (OUTPATIENT)
Dept: CALL CENTER | Facility: HOSPITAL | Age: 85
End: 2020-05-08

## 2020-05-08 NOTE — OUTREACH NOTE
Medical Week 1 Survey      Responses   [facility] [place]   COVID-19 Test Status  Negative   Does the patient have one of the following disease processes/diagnoses(primary or secondary)?  Other   Is there a successful TCM telephone encounter documented?  No   Week 1 attempt successful?  Yes   Call start time  1136   Call end time  1142   Meds reviewed with patient/caregiver?  Yes   Is the patient having any side effects they believe may be caused by any medication additions or changes?  No   Does the patient have all medications ordered at discharge?  Yes   Is the patient taking all medications as directed (includes completed medication regime)?  Yes   Comments regarding appointments  PCP 06/06/20   Does the patient have a primary care provider?   Yes   Does the patient have an appointment with their PCP within 7 days of discharge?  Greater than 7 days   What is preventing the patient from scheduling follow up appointments within 7 days of discharge?  Earlier appointment not available   Has the patient kept scheduled appointments due by today?  N/A   Has home health visited the patient within 72 hours of discharge?  Yes   Pulse Ox monitoring  Intermittent   Pulse Ox device source  Patient   O2 Sat comments  96% most of the time   Psychosocial issues?  No   Did the patient receive a copy of their discharge instructions?  Yes   Nursing interventions  Reviewed instructions with patient   What is the patient's perception of their health status since discharge?  Improving   Is the patient/caregiver able to teach back signs and symptoms related to disease process for when to call PCP?  Yes   Is the patient/caregiver able to teach back signs and symptoms related to disease process for when to call 911?  Yes   Is the patient/caregiver able to teach back the hierarchy of who to call/visit for symptoms/problems? PCP, Specialist, Home health nurse, Urgent Care, ED, 911  Yes   Additional teach  back comments  Continues with quarantine.   Week 1 call completed?  Yes   Wrap up additional comments  States is feeling much better-denies any problems today. States pulse ox staying around 96%. States home health monitoring INR-was 3.5 yesterday. Denies any fever, SOA, cough, or chest pain. No complaints today.          Clemencia Hilario RN

## 2020-05-11 ENCOUNTER — ANTICOAGULATION VISIT (OUTPATIENT)
Dept: PHARMACY | Facility: HOSPITAL | Age: 85
End: 2020-05-11

## 2020-05-11 ENCOUNTER — READMISSION MANAGEMENT (OUTPATIENT)
Dept: CALL CENTER | Facility: HOSPITAL | Age: 85
End: 2020-05-11

## 2020-05-11 DIAGNOSIS — I48.0 PAF (PAROXYSMAL ATRIAL FIBRILLATION) (HCC): ICD-10-CM

## 2020-05-11 LAB — INR PPP: 2.5

## 2020-05-11 NOTE — OUTREACH NOTE
Medical Week 2 Survey      Responses   [facility]   COVID-19 Test Status  Negative   Does the patient have one of the following disease processes/diagnoses(primary or secondary)?  Other   Week 2 attempt successful?  Yes   Call start time  1356   Call end time  1359   Meds reviewed with patient/caregiver?  Yes   Is the patient having any side effects they believe may be caused by any medication additions or changes?  No   Does the patient have all medications ordered at discharge?  Yes   Is the patient taking all medications as directed (includes completed medication regime)?  Yes   Does the patient have a primary care provider?   Yes   Does the patient have an appointment with their PCP within 7 days of discharge?  Greater than 7 days   What is preventing the patient from scheduling follow up appointments within 7 days of discharge?  Earlier appointment not available   Nursing Interventions  Verified appointment date/time/provider   Has the patient kept scheduled appointments due by today?  N/A   What is the Home health agency?    [facility]    Has home health visited the patient within 72 hours of discharge?  Yes   Pulse Ox monitoring  Intermittent   Did the patient receive a copy of their discharge instructions?  Yes   Nursing interventions  Reviewed instructions with patient   What is the patient's perception of their health status since discharge?  Improving   Is the patient/caregiver able to teach back signs and symptoms related to disease process for when to call PCP?  Yes   Is the patient/caregiver able to teach back signs and symptoms related to disease process for when to call 911?  Yes   Is the patient/caregiver able to teach back the hierarchy of who to call/visit for symptoms/problems? PCP, Specialist, Home health nurse, Urgent Care, ED, 911  Yes   Week 2 Call Completed?  Yes          Loyda Casarez LPN

## 2020-05-11 NOTE — PROGRESS NOTES
Anticoagulation Clinic Progress Note    Anticoagulation Summary  As of 2020    INR goal:   2.0-3.0   TTR:   72.7 % (1.6 y)   INR used for dosin.50 (2020)   Warfarin maintenance plan:   4 mg every Mon, Wed, Fri; 2 mg all other days   Weekly warfarin total:   20 mg   No change documented:   Alexander Valiente RPH   Plan last modified:   Alexander Valiente RPH (2020)   Next INR check:   2020   Priority:   Maintenance   Target end date:   Indefinite    Indications    PAF (paroxysmal atrial fibrillation) (CMS/[facility]) [I48.0]             Anticoagulation Episode Summary     INR check location:       Preferred lab:       [facility]    Comments:   [facility] beginning 20      Anticoagulation Care Providers     Provider Role Specialty Phone number    Rommel Veliz MD Referring Cardiology [phone]        Pertinent info:  Actually finished prednisone  per nurse.     INR History:  Anticoagulation Monitoring 2020   INR 3.60 3.50 2.50   INR Date 2020   INR Goal 2.0-3.0 2.0-3.0 2.0-3.0   Trend Same Down Same   Last Week Total 20 mg 18 mg 16 mg   Next Week Total 20 mg 18 mg 20 mg   Sun - 2 mg -   Mon 2 mg () - 4 mg   Tue 2 mg - 2 mg   Wed 4 mg - 4 mg   Thu - Hold () -   Fri - 4 mg -   Sat - 2 mg -   Visit Report - - -   Some recent data might be hidden       Plan:  1. INR is Therapeutic today- see above in Anticoagulation Summary.   Provided instructions to Maria Alejandra with [facility] to Change their warfarin regimen- see above in Anticoagulation Summary.  2. Follow up in 3 days      Alexander Valiente RPH

## 2020-05-14 ENCOUNTER — READMISSION MANAGEMENT (OUTPATIENT)
Dept: CALL CENTER | Facility: HOSPITAL | Age: 85
End: 2020-05-14

## 2020-05-14 ENCOUNTER — ANTICOAGULATION VISIT (OUTPATIENT)
Dept: PHARMACY | Facility: HOSPITAL | Age: 85
End: 2020-05-14

## 2020-05-14 DIAGNOSIS — I48.0 PAF (PAROXYSMAL ATRIAL FIBRILLATION) (HCC): ICD-10-CM

## 2020-05-14 LAB — INR PPP: 3.1

## 2020-05-14 NOTE — OUTREACH NOTE
Medical Week 2 Survey      Responses   [facility]   COVID-19 Test Status  Negative   Does the patient have one of the following disease processes/diagnoses(primary or secondary)?  Other   Week 2 attempt successful?  Yes   Call start time  1516   Discharge diagnosis  Asthma with acute exacerbation   Call end time  1518   Meds reviewed with patient/caregiver?  Yes   Is the patient having any side effects they believe may be caused by any medication additions or changes?  No   Does the patient have all medications ordered at discharge?  Yes   Is the patient taking all medications as directed (includes completed medication regime)?  Yes   Does the patient have a primary care provider?   Yes   Has the patient kept scheduled appointments due by today?  N/A   What is the Home health agency?    [facility]    Has home health visited the patient within 72 hours of discharge?  Yes   Pulse Ox monitoring  Intermittent   Pulse Ox device source  Patient   O2 Sat: education provided  Sat levels, Monitoring frequency   Psychosocial issues?  No   Comments  A1C done in June   Did the patient receive a copy of their discharge instructions?  Yes   Nursing interventions  Reviewed instructions with patient   What is the patient's perception of their health status since discharge?  Improving   Is the patient/caregiver able to teach back signs and symptoms related to disease process for when to call PCP?  Yes   Is the patient/caregiver able to teach back signs and symptoms related to disease process for when to call 911?  Yes   Is the patient/caregiver able to teach back the hierarchy of who to call/visit for symptoms/problems? PCP, Specialist, Home health nurse, Urgent Care, ED, 911  Yes   Additional teach back comments  She is doing well, HH is coming, no issues at this time, partially paralyzed lung with SOA   Week 2 Call Completed?  Yes          Tracy Ruff RN

## 2020-05-14 NOTE — PROGRESS NOTES
Anticoagulation Clinic Progress Note    Anticoagulation Summary  As of 5/14/2020    INR goal:   2.0-3.0   TTR:   72.8 % (1.6 y)   INR used for dosing:   3.10! (5/14/2020)   Warfarin maintenance plan:   4 mg every Mon, Wed, Fri; 2 mg all other days   Weekly warfarin total:   20 mg   Plan last modified:   Alexander Valiente RPH (5/14/2020)   Next INR check:   5/18/2020   Priority:   Maintenance   Target end date:   Indefinite    Indications    PAF (paroxysmal atrial fibrillation) (CMS/HCC) [I48.0]             Anticoagulation Episode Summary     INR check location:       Preferred lab:       Send INR reminders to:   [facility] CLINICAL POOL    Comments:   [facility] beginning 4/22/20      Anticoagulation Care Providers     Provider Role Specialty Phone number    Rommel Veliz MD Referring Cardiology [phone]          INR History:  Anticoagulation Monitoring 5/7/2020 5/11/2020 5/14/2020   INR 3.50 2.50 3.10   INR Date 5/7/2020 5/11/2020 5/14/2020   INR Goal 2.0-3.0 2.0-3.0 2.0-3.0   Trend Down Same Same   Last Week Total 18 mg 16 mg 18 mg   Next Week Total 18 mg 20 mg 18 mg   Sun 2 mg - 2 mg   Mon - 4 mg -   Tue - 2 mg -   Wed - 4 mg -   Thu Hold (5/7) - 2 mg   Fri 4 mg - 2 mg (5/15)   Sat 2 mg - 2 mg   Visit Report - - -   Some recent data might be hidden       Plan:  1. INR is Supratherapeutic today- see above in Anticoagulation Summary.   Provided instructions to Maria Alejandra with [facility] to Change their warfarin regimen- see above in Anticoagulation Summary.  2. Follow up in 4 days      Alexander Valiente RPH

## 2020-05-18 ENCOUNTER — ANTICOAGULATION VISIT (OUTPATIENT)
Dept: PHARMACY | Facility: HOSPITAL | Age: 85
End: 2020-05-18

## 2020-05-18 DIAGNOSIS — I48.0 PAF (PAROXYSMAL ATRIAL FIBRILLATION) (HCC): ICD-10-CM

## 2020-05-18 LAB — INR PPP: 2.2

## 2020-05-18 NOTE — PROGRESS NOTES
Anticoagulation Clinic Progress Note    Anticoagulation Summary  As of 2020    INR goal:   2.0-3.0   TTR:   72.9 % (1.6 y)   INR used for dosin.20 (2020)   Warfarin maintenance plan:   4 mg every Mon, Fri; 2 mg all other days   Weekly warfarin total:   18 mg   Plan last modified:   Alexander Valiente RPH (2020)   Next INR check:   2020   Priority:   Maintenance   Target end date:   Indefinite    Indications    PAF (paroxysmal atrial fibrillation) (CMS/HCC) [I48.0]             Anticoagulation Episode Summary     INR check location:       Preferred lab:       Send INR reminders to:   [facility] CLINICAL POOL    Comments:   [facility] Home lab beginning 20      Anticoagulation Care Providers     Provider Role Specialty Phone number    Rommel Veliz MD Referring Cardiology [phone]          INR History:  Anticoagulation Monitoring 2020   INR 2.50 3.10 2.20   INR Date 2020   INR Goal 2.0-3.0 2.0-3.0 2.0-3.0   Trend Same Same Down   Last Week Total 16 mg 18 mg 18 mg   Next Week Total 20 mg 18 mg 18 mg   Sun - 2 mg -   Mon 4 mg - 4 mg   Tue 2 mg - 2 mg   Wed 4 mg - 2 mg   Thu - 2 mg -   Fri - 2 mg (5/15) -   Sat - 2 mg -   Visit Report - - -   Some recent data might be hidden       Plan:  1. INR is Therapeutic today- see above in Anticoagulation Summary.   Provided instructions to Hillary with [facility] to Change their warfarin regimen (Trial 4 mg MF, 2 mg rest of wk) - see above in Anticoagulation Summary.  2. Follow up in 3 days      Alexander Valiente RPH

## 2020-05-20 ENCOUNTER — READMISSION MANAGEMENT (OUTPATIENT)
Dept: CALL CENTER | Facility: HOSPITAL | Age: 85
End: 2020-05-20

## 2020-05-20 NOTE — OUTREACH NOTE
Medical Week 3 Survey      Responses   [facility]   COVID-19 Test Status  Negative   Does the patient have one of the following disease processes/diagnoses(primary or secondary)?  Other   Week 3 attempt successful?  Yes   Call start time  1350   Call end time  1357   General alerts for this patient  [facility]   Discharge diagnosis  Asthma with acute exacerbation   Meds reviewed with patient/caregiver?  Yes   Is the patient having any side effects they believe may be caused by any medication additions or changes?  No   Does the patient have all medications ordered at discharge?  Yes   Is the patient taking all medications as directed (includes completed medication regime)?  Yes   Medication comments  Has completed antibiotics and steroids.   Comments regarding appointments  PCP 06/06/20   Does the patient have a primary care provider?   Yes   Does the patient have an appointment with their PCP within 7 days of discharge?  Greater than 7 days   What is preventing the patient from scheduling follow up appointments within 7 days of discharge?  Earlier appointment not available   Has the patient kept scheduled appointments due by today?  N/A   What is the Home health agency?    [facility]   Has home health visited the patient within 72 hours of discharge?  Yes   Pulse Ox monitoring  Intermittent   Pulse Ox device source  Patient   O2 Sat comments  96%    O2 Sat: education provided  Sat levels, Monitoring frequency   Did the patient receive a copy of their discharge instructions?  Yes   Nursing interventions  Reviewed instructions with patient   What is the patient's perception of their health status since discharge?  Improving   Is the patient/caregiver able to teach back signs and symptoms related to disease process for when to call PCP?  Yes   Is the patient/caregiver able to teach back signs and symptoms related to disease process for when to call 911?  Yes   Is  "the patient/caregiver able to teach back the hierarchy of who to call/visit for symptoms/problems? PCP, Specialist, Home health nurse, Urgent Care, ED, 911  Yes   Additional teach back comments  Patient states she is doing well.  States Sunday was the first day she could get out of quarantine and has been out every day since.  States she is happy to be out of her room.  States \"my breathing is good and I'm feeling so much better\".     Week 3 Call Completed?  Yes   Wrap up additional comments  No questions or needs at this time          Donna Martinez, GEORGE  "

## 2020-05-21 ENCOUNTER — ANTICOAGULATION VISIT (OUTPATIENT)
Dept: PHARMACY | Facility: HOSPITAL | Age: 85
End: 2020-05-21

## 2020-05-21 DIAGNOSIS — I48.0 PAF (PAROXYSMAL ATRIAL FIBRILLATION) (HCC): ICD-10-CM

## 2020-05-21 LAB — INR PPP: 2

## 2020-05-21 NOTE — PROGRESS NOTES
Anticoagulation Clinic Progress Note    Anticoagulation Summary  As of 2020    INR goal:   2.0-3.0   TTR:   73.1 % (1.6 y)   INR used for dosin.00 (2020)   Warfarin maintenance plan:   4 mg every Mon, Fri; 2 mg all other days   Weekly warfarin total:   18 mg   No change documented:   Alexander Valiente RPH   Plan last modified:   Alexander Valiente RPH (2020)   Next INR check:   2020   Priority:   Maintenance   Target end date:   Indefinite    Indications    PAF (paroxysmal atrial fibrillation) (CMS/[facility]) [I48.0]             Anticoagulation Episode Summary     INR check location:       Preferred lab:       Send INR reminders to:   [facility]    Comments:   [facility] Home lab beginning 20      Anticoagulation Care Providers     Provider Role Specialty Phone number    Rommel Veliz MD Referring Cardiology [phone]          INR History:  Anticoagulation Monitoring 2020   INR 3.10 2.20 2.00   INR Date 2020   INR Goal 2.0-3.0 2.0-3.0 2.0-3.0   Trend Same Down Same   Last Week Total 18 mg 18 mg 16 mg   Next Week Total 18 mg 18 mg 18 mg   Sun 2 mg - 2 mg   Mon - 4 mg 4 mg   Tue - 2 mg 2 mg   Wed - 2 mg 2 mg   Thu 2 mg - 2 mg   Fri 2 mg (5/15) - 4 mg   Sat 2 mg - 2 mg   Visit Report - - -   Some recent data might be hidden       Plan:  1. INR is Therapeutic today- see above in Anticoagulation Summary.   Provided instructions to Hillary with [facility] to Continue their warfarin regimen- see above in Anticoagulation Summary.  2. Follow up in 1 week      Alexander Valiente RPH

## 2020-05-26 ENCOUNTER — READMISSION MANAGEMENT (OUTPATIENT)
Dept: CALL CENTER | Facility: HOSPITAL | Age: 85
End: 2020-05-26

## 2020-05-26 NOTE — OUTREACH NOTE
Medical Week 4 Survey      Responses   [facility]   COVID-19 Test Status  Negative   Does the patient have one of the following disease processes/diagnoses(primary or secondary)?  Other   Week 4 attempt successful?  Yes   Call start time  0849   Call end time  0853   General alerts for this patient  [facility]   Discharge diagnosis  Asthma with acute exacerbation   Meds reviewed with patient/caregiver?  Yes   Is the patient taking all medications as directed (includes completed medication regime)?  Yes   Has the patient kept scheduled appointments due by today?  Yes   Is the patient still receiving Home Health Services?  Yes   Home health comments  HH still coming in to see pt   Pulse Ox monitoring  Intermittent   Pulse Ox device source  Patient   Psychosocial issues?  No   What is the patient's perception of their health status since discharge?  Same   Is the patient/caregiver able to teach back signs and symptoms related to disease process for when to call PCP?  Yes   Is the patient/caregiver able to teach back signs and symptoms related to disease process for when to call 911?  Yes   Is the patient/caregiver able to teach back the hierarchy of who to call/visit for symptoms/problems? PCP, Specialist, Home health nurse, Urgent Care, ED, 911  Yes   Additional teach back comments  Pt sattes she still has some SOA and is using her nebulizer machine. Sputum is still yellowish at times and she states her SOA is worse when she goes outside. Has pulmonary f/u tomorrow and then will see her PCP later in the week. Enc rest, nebs and return to ED if breathing worsens.    Week 4 Call Completed?  Yes   Would the patient like one additional call?  Yes          Jayde Coley RN

## 2020-05-28 ENCOUNTER — ANTICOAGULATION VISIT (OUTPATIENT)
Dept: PHARMACY | Facility: HOSPITAL | Age: 85
End: 2020-05-28

## 2020-05-28 DIAGNOSIS — I48.0 PAF (PAROXYSMAL ATRIAL FIBRILLATION) (HCC): ICD-10-CM

## 2020-05-28 LAB — INR PPP: 1.9

## 2020-05-28 NOTE — PROGRESS NOTES
Anticoagulation Clinic Progress Note    Anticoagulation Summary  As of 2020    INR goal:   2.0-3.0   TTR:   72.2 % (1.6 y)   INR used for dosin.90! (2020)   Warfarin maintenance plan:   4 mg every Mon, Fri; 2 mg all other days   Weekly warfarin total:   18 mg   No change documented:   Concepcion Hager RPH   Plan last modified:   Alexander Valiente RPH (2020)   Next INR check:   2020   Priority:   Maintenance   Target end date:   Indefinite    Indications    PAF (paroxysmal atrial fibrillation) (CMS/HCC) [I48.0]             Anticoagulation Episode Summary     INR check location:       Preferred lab:       Send INR reminders to:    [facility]    Comments:   [facility] Home lab beginning 20      Anticoagulation Care Providers     Provider Role Specialty Phone number    Rommel Veliz MD Referring Cardiology [phone]          Drug interactions: has remained unchanged.  Diet: has remained unchanged.    Clinic Interview:  No pertinent clinical findings have been reported.    INR History:  Anticoagulation Monitoring 2020   INR 2.20 2.00 1.90   INR Date 2020   INR Goal 2.0-3.0 2.0-3.0 2.0-3.0   Trend Down Same Same   Last Week Total 18 mg 16 mg 18 mg   Next Week Total 18 mg 18 mg 18 mg   Sun - 2 mg 2 mg   Mon 4 mg 4 mg 4 mg   Tue 2 mg 2 mg 2 mg   Wed 2 mg 2 mg 2 mg   Thu - 2 mg 2 mg   Fri - 4 mg 4 mg   Sat - 2 mg 2 mg   Visit Report - - -   Some recent data might be hidden       Plan:  1. INR is 1.9 today- see above in Anticoagulation Summary.   Will instruct Caitlyn Longoria to Continue their warfarin regimen- see above in Anticoagulation Summary.  2. Follow up in 1 weeks  3. Pt has agreed to only be called if INR out of range. They have been instructed to call if any changes in medications, doses, concerns, etc. Patient expresses understanding and has no further questions at this time.    Concepcion Hager RP

## 2020-06-02 ENCOUNTER — READMISSION MANAGEMENT (OUTPATIENT)
Dept: CALL CENTER | Facility: HOSPITAL | Age: 85
End: 2020-06-02

## 2020-06-02 NOTE — OUTREACH NOTE
Medical Week 5 Survey      Responses   [facility] patient discharged from?  [place]   COVID-19 Test Status  Negative   Does the patient have one of the following disease processes/diagnoses(primary or secondary)?  Other   Week 5 attempt successful?  No          Jayde Coley RN

## 2020-06-04 ENCOUNTER — ANTICOAGULATION VISIT (OUTPATIENT)
Dept: PHARMACY | Facility: HOSPITAL | Age: 85
End: 2020-06-04

## 2020-06-04 ENCOUNTER — OFFICE VISIT (OUTPATIENT)
Dept: ONCOLOGY | Facility: CLINIC | Age: 85
End: 2020-06-04

## 2020-06-04 DIAGNOSIS — C91.10 CLL (CHRONIC LYMPHOCYTIC LEUKEMIA) (HCC): ICD-10-CM

## 2020-06-04 DIAGNOSIS — I48.0 PAF (PAROXYSMAL ATRIAL FIBRILLATION) (HCC): ICD-10-CM

## 2020-06-04 DIAGNOSIS — I48.0 PAF (PAROXYSMAL ATRIAL FIBRILLATION) (HCC): Primary | ICD-10-CM

## 2020-06-04 LAB — INR PPP: 2.5

## 2020-06-04 PROCEDURE — 99443 PR PHYS/QHP TELEPHONE EVALUATION 21-30 MIN: CPT | Performed by: INTERNAL MEDICINE

## 2020-06-04 NOTE — PROGRESS NOTES
Anticoagulation Clinic Progress Note    Anticoagulation Summary  As of 2020    INR goal:   2.0-3.0   TTR:   72.3 % (1.6 y)   INR used for dosin.50 (2020)   Warfarin maintenance plan:   4 mg every Mon, Fri; 2 mg all other days   Weekly warfarin total:   18 mg   No change documented:   Alexander Valiente RPH   Plan last modified:   Alexander Valiente RPH (2020)   Next INR check:   2020   Priority:   Maintenance   Target end date:   Indefinite    Indications    PAF (paroxysmal atrial fibrillation) (CMS/[facility]) [I48.0]             Anticoagulation Episode Summary     INR check location:       Preferred lab:       Send INR reminders to:   [facility] CLINICAL [place]    Comments:   [facility] Home lab beginning 20      Anticoagulation Care Providers     Provider Role Specialty Phone number    Rommel Veliz MD Referring Cardiology [phone]          INR History:  Anticoagulation Monitoring 2020   INR 2.00 1.90 2.50   INR Date 2020   INR Goal 2.0-3.0 2.0-3.0 2.0-3.0   Trend Same Same Same   Last Week Total 16 mg 18 mg 18 mg   Next Week Total 18 mg 18 mg 18 mg   Sun 2 mg 2 mg 2 mg   Mon 4 mg 4 mg 4 mg   Tue 2 mg 2 mg 2 mg   Wed 2 mg 2 mg 2 mg   Thu 2 mg 2 mg 2 mg   Fri 4 mg 4 mg 4 mg   Sat 2 mg 2 mg 2 mg   Visit Report - - -   Some recent data might be hidden       Plan:  1. INR is Therapeutic today- see above in Anticoagulation Summary.   Provided instructions to [facility] Alejandra with [facility] to Continue their warfarin regimen- see above in Anticoagulation Summary.  2. Follow up in 1 week      Alexander Valiente RPH

## 2020-06-04 NOTE — PROGRESS NOTES
Subjective .      reason for consultation: Follow-up of chronic lymphocytic leukemia    Referring MD:    Amarilis Suarez MD    HISTORY OF PRESENT ILLNESS:  The patient is a 85 y.o. year old female who is here for follow-up with the above-mentioned history.    History of Present Illness     Patient is an 85-year-old female whom I had seen in 2014 when she was hospitalized at [facility] and was diagnosed with chronic lymphocytic leukemia.  She has not been to see me in over 4 years and is following with Dr. Suarez her primary care doctor and her white count is gone up a little higher than usual to 22,000 and she is referred back to us for evaluation.  We are doing a telephone visit because of the coronavirus pandemic and her age and susceptibility.    When I originally saw her in 2014 she was brought into the ER unresponsive in septic shock on 03/25/2014 with methicillin-resistant staphylococcus identified in her blood cultures. The patient has been on antibiotic with improvement, but had some residual kidney damage with a creatinine in the 4-range requiring hemodialysis, and was noted on admission to have an elevated white count with lymphocytosis, normal hemoglobin, but a platelet count of 95,000, and over the course of the illness her platelet count normalized and the white count had gone up into the 20,000 range with lymphocytosis. A flow cytometry was sent which is consistent with CLL, she came to our office once or twice and then stopped coming because she was so stable     She tells me now she was hospitalized recently with rhinovirus infection and has had diagnosed with asthma and is having a lot of respiratory issues but does not require oxygen.She is at [facility] home in independent living and managing fairly well    She denies fever sweats or weight loss.  Her last white count was on 5/26/2020  Showed a white count of 18,000 with 66 lymphs and 27 neutrophils platelet white hemoglobin is 12.6 platelet  188,000    I reassured Christopher that no treatment is indicated for this level of leukocytosis from CLL and if she has no systemic complaints I do not feel strongly about doing CAT scans at her age but I would like to physically examine her in the office in a couple of months to make sure there is no obvious adenopathy or hepatosplenomegaly.    She does have recurrent infections and we can check quantitative immunoglobulins to see how low they are as another adjunctive option to prevent recurrent infections if she has hypogammaglobulinemia    She remains on Coumadin for atrial fibrillation    Past Medical History:   Diagnosis Date   • Aortic calcification (CMS/HCC)     mild, on echo 12/17/2017   • Aortic regurgitation     Trace   • Asthma    • CAD (coronary artery disease)    • Chronic combined systolic and diastolic congestive heart failure (CMS/HCC)    • CKD (chronic kidney disease) stage 3, GFR 30-59 ml/min (CMS/HCC)    • Coronary artery disease involving native coronary artery of native heart with angina pectoris (CMS/HCC)    • DM type 2 (diabetes mellitus, type 2) (CMS/HCC)    • Hypertension    • Mild mitral regurgitation    • Mitral annular calcification     12/8/2017- echo, moderate   • PAF (paroxysmal atrial fibrillation) (CMS/HCC)    • SSS (sick sinus syndrome) (CMS/HCC)    • Tricuspid regurgitation     Trace       ONCOLOGIC HISTORY:  (History from previous dates can be found in the separate document.)    Current Outpatient Medications on File Prior to Visit   Medication Sig Dispense Refill   • acetaminophen (TYLENOL) 650 MG 8 hr tablet Take 650 mg by mouth 2 (Two) Times a Day.     • Acetylcysteine 500 MG capsule Take 1,000 mg by mouth Daily With Breakfast & Dinner.     • albuterol (PROVENTIL) (2.5 MG/3ML) 0.083% nebulizer solution Take 2.5 mg by nebulization Every 4 (Four) Hours As Needed for Wheezing.     • albuterol sulfate  (90 Base) MCG/ACT inhaler Inhale 2 puffs Every 4 (Four) Hours As Needed for  Wheezing. 1 inhaler 3   • amoxicillin (AMOXIL) 500 MG capsule Take 4 capsules by mouth 1 (One) Time As Needed (dentist) for up to 1 dose. 4 capsule 0   • aspirin 81 MG tablet Take 81 mg by mouth Daily.     • Benralizumab (FASENRA SC) Inject  under the skin into the appropriate area as directed. Gets one shot a month, next shot may 13 then one every 8 weeks     • carvedilol (COREG) 3.125 MG tablet TAKE ONE TABLET BY MOUTH TWICE A  tablet 1   • cephalexin (KEFLEX) 500 MG capsule Take 500 mg by mouth 3 (Three) Times a Day.     • cetirizine (zyrTEC) 10 MG tablet Take 10 mg by mouth 2 (Two) Times a Day.     • Dupilumab (DUPIXENT) 300 MG/2ML solution prefilled syringe Inject  under the skin into the appropriate area as directed. evr other week, due this Friday 02/14/2020     • fluticasone (FLONASE) 50 MCG/ACT nasal spray 1 spray into the nostril(s) as directed by provider 2 (Two) Times a Day.     • Fluticasone-Umeclidin-Vilant (TRELEGY ELLIPTA IN) Inhale Daily. As directed      • furosemide (LASIX) 20 MG tablet Take 20 mg by mouth Every Morning.     • glipiZIDE (GLUCOTROL) 5 MG tablet Take 2.5 mg by mouth Every Morning.     • ketoconazole (NIZORAL) 2 % cream Apply  topically to the appropriate area as directed Daily.     • losartan (COZAAR) 25 MG tablet TAKE ONE TABLET BY MOUTH DAILY 90 tablet 1   • Menthol, Topical Analgesic, (BIOFREEZE ROLL-ON EX) Apply  topically.     • montelukast (SINGULAIR) 10 MG tablet Take 10 mg by mouth Every Night.     • oxazepam (SERAX) 10 MG capsule Take 1 capsule by mouth Every Night. 5 capsule 0   • oxybutynin XL (DITROPAN-XL) 10 MG 24 hr tablet Take 10 mg by mouth every night at bedtime.     • polyethyl glycol-propyl glycol (LUBRICATING EYE DROPS) 0.4-0.3 % solution ophthalmic solution Administer 1 drop to both eyes Every 1 (One) Hour As Needed.     • rosuvastatin (CRESTOR) 20 MG tablet Take 20 mg by mouth Every Night.     • warfarin (COUMADIN) 4 MG tablet Take one-half tablet (2 mg)  by mouth Tues, Thurs, and Sat, and take one tablet (4 mg) by mouth all other days or as directed. 75 tablet 1     No current facility-administered medications on file prior to visit.        ALLERGIES:     Allergies   Allergen Reactions   • Accupril [Quinapril Hcl] Swelling, Other (See Comments), GI Intolerance and Delirium     HA, constipation    • Ahist [Chlorpheniramine] Nausea Only, Other (See Comments) and Dizziness     Headache, Blurred vision   • Clarithromycin Nausea Only, Other (See Comments) and Mental Status Change     HA, Depression, Flushing   • Esomeprazole GI Intolerance   • Levalbuterol Swelling   • Levocetirizine Diarrhea and GI Intolerance   • Lipitor [Atorvastatin] Other (See Comments) and Myalgia     Dark urine   • Omeprazole Nausea Only and Other (See Comments)     HA   • Pravachol [Pravastatin] Nausea Only and GI Intolerance     Bloated, Constipation, HA   • Sulindac Other (See Comments) and Myalgia     HA, joint pain, bruising   • Valdecoxib Irritability   • Chlorcyclizine Unknown - Low Severity   • Diclofenac Sodium Unknown - Low Severity   • Diphenhydramine Unknown - Low Severity   • Lodine [Etodolac] Unknown - Low Severity   • Sulfa Antibiotics Unknown - Low Severity       Social History     Socioeconomic History   • Marital status: Single     Spouse name: Not on file   • Number of children: Not on file   • Years of education: Not on file   • Highest education level: Not on file   Tobacco Use   • Smoking status: Never Smoker   • Smokeless tobacco: Never Used   • Tobacco comment: caffeine use   Substance and Sexual Activity   • Alcohol use: Not Currently   • Drug use: No   • Sexual activity: Defer   Lifestyle   • Physical activity:     Days per week: 2 days     Minutes per session: 60 min   • Stress: Not on file         Cancer-related family history is not on file.     Review of Systems   Constitutional: Positive for fatigue. Negative for appetite change, diaphoresis, fever and unexpected  weight change.   Respiratory: Positive for cough, shortness of breath and wheezing.    Gastrointestinal: Negative.    Genitourinary: Negative.    Neurological: Negative.    Psychiatric/Behavioral: Negative.    All other systems reviewed and are negative.    A comprehensive 14 point review of systems was performed and was negative except as mentioned.    Objective      There were no vitals filed for this visit.  Telephone visit no vitals  No flowsheet data found.    Physical Exam telephone visit no physical    RECENT LABS:  Hematology WBC   Date Value Ref Range Status   04/30/2020 22.83 (H) 3.40 - 10.80 10*3/mm3 Final   11/27/2019 11.55 (H) 4.5 - 11.0 10*3/uL Final     RBC   Date Value Ref Range Status   04/30/2020 3.88 3.77 - 5.28 10*6/mm3 Final   11/27/2019 4.05 4.0 - 5.2 10*6/uL Final     Hemoglobin   Date Value Ref Range Status   04/30/2020 12.6 12.0 - 15.9 g/dL Final   11/27/2019 13.0 12.0 - 16.0 g/dL Final     Hematocrit   Date Value Ref Range Status   04/30/2020 38.6 34.0 - 46.6 % Final   11/27/2019 43.1 36.0 - 46.0 % Final     Platelets   Date Value Ref Range Status   04/30/2020 174 140 - 450 10*3/mm3 Final   11/27/2019 205 140 - 440 10*3/uL Final        Assessment/Plan    1. Chronic lymphocytic leukemia  -based on her hemoglobin   and a normal platelet count, no treatment indication exists.    2. We will check quantitative immunoglobulins to see if IVIG replacement may help with recurrent infections    Plan  1.  See me in 2 months with CBC chemistry and quantitative immunoglobulins      You have chosen to receive care through a telephone visit. Do you consent to use a telephone visit for your medical care today? Yes    Telephone visit 25 to 30 minutes              Cc:  Zenaida Suarez*

## 2020-06-05 ENCOUNTER — OFFICE (OUTPATIENT)
Dept: URBAN - METROPOLITAN AREA CLINIC 65 | Facility: CLINIC | Age: 85
End: 2020-06-05
Payer: COMMERCIAL

## 2020-06-05 VITALS
TEMPERATURE: 97.5 F | DIASTOLIC BLOOD PRESSURE: 76 MMHG | HEART RATE: 88 BPM | HEIGHT: 65 IN | WEIGHT: 210 LBS | SYSTOLIC BLOOD PRESSURE: 124 MMHG

## 2020-06-05 DIAGNOSIS — K31.84 GASTROPARESIS: ICD-10-CM

## 2020-06-05 DIAGNOSIS — K21.9 GASTRO-ESOPHAGEAL REFLUX DISEASE WITHOUT ESOPHAGITIS: ICD-10-CM

## 2020-06-05 DIAGNOSIS — R14.2 ERUCTATION: ICD-10-CM

## 2020-06-05 DIAGNOSIS — E11.9 TYPE 2 DIABETES MELLITUS WITHOUT COMPLICATIONS: ICD-10-CM

## 2020-06-05 PROCEDURE — 99214 OFFICE O/P EST MOD 30 MIN: CPT | Performed by: INTERNAL MEDICINE

## 2020-06-05 RX ORDER — PANTOPRAZOLE SODIUM 40 MG/1
TABLET, DELAYED RELEASE ORAL
Qty: 30 | Refills: 11 | Status: COMPLETED
End: 2022-08-05

## 2020-06-11 ENCOUNTER — ANTICOAGULATION VISIT (OUTPATIENT)
Dept: PHARMACY | Facility: HOSPITAL | Age: 85
End: 2020-06-11

## 2020-06-11 DIAGNOSIS — I48.0 PAF (PAROXYSMAL ATRIAL FIBRILLATION) (HCC): ICD-10-CM

## 2020-06-11 LAB — INR PPP: 2.8

## 2020-06-11 NOTE — PROGRESS NOTES
Anticoagulation Clinic Progress Note    Anticoagulation Summary  As of 2020    INR goal:   2.0-3.0   TTR:   72.6 % (1.7 y)   INR used for dosin.80 (2020)   Warfarin maintenance plan:   4 mg every Mon, Fri; 2 mg all other days   Weekly warfarin total:   18 mg   No change documented:   Alexander Valiente RPH   Plan last modified:   Alexander Valiente RPH (2020)   Next INR check:   2020   Priority:   Maintenance   Target end date:   Indefinite    Indications    PAF (paroxysmal atrial fibrillation) (CMS/[facility]) [I48.0]             Anticoagulation Episode Summary     INR check location:       Preferred lab:       Send INR reminders to:   [facility] CLINICAL POOL    Comments:   [facility] Home lab beginning 20      Anticoagulation Care Providers     Provider Role Specialty Phone number    Rommel Veliz MD Referring Cardiology [phone]        Pertinent info:  Discharged from [facility]    INR History:  Anticoagulation Monitoring 2020   INR 1.90 2.50 2.80   INR Date 2020   INR Goal 2.0-3.0 2.0-3.0 2.0-3.0   Trend Same Same Same   Last Week Total 18 mg 18 mg 18 mg   Next Week Total 18 mg 18 mg 18 mg   Sun 2 mg 2 mg 2 mg   Mon 4 mg 4 mg 4 mg   Tue 2 mg 2 mg 2 mg   Wed 2 mg 2 mg 2 mg   Thu 2 mg 2 mg 2 mg   Fri 4 mg 4 mg 4 mg   Sat 2 mg 2 mg 2 mg   Visit Report - - -   Some recent data might be hidden       Plan:  1. INR is Therapeutic today- see above in Anticoagulation Summary.   Provided instructions to Maria Alejandra with [facility] to Continue their warfarin regimen- see above in Anticoagulation Summary.  2. Follow up in 2 weeks at [facility]; results will be faxed to [facility].      Alexander Valiente RPH

## 2020-06-17 DIAGNOSIS — I48.0 PAF (PAROXYSMAL ATRIAL FIBRILLATION) (HCC): Primary | ICD-10-CM

## 2020-06-24 ENCOUNTER — ANTICOAGULATION VISIT (OUTPATIENT)
Dept: PHARMACY | Facility: HOSPITAL | Age: 85
End: 2020-06-24

## 2020-06-24 DIAGNOSIS — I48.0 PAF (PAROXYSMAL ATRIAL FIBRILLATION) (HCC): ICD-10-CM

## 2020-06-24 LAB — INR PPP: 2.3

## 2020-06-24 NOTE — PROGRESS NOTES
Anticoagulation Clinic Progress Note    Anticoagulation Summary  As of 2020    INR goal:   2.0-3.0   TTR:   73.2 % (1.7 y)   INR used for dosin.30 (2020)   Warfarin maintenance plan:   4 mg every Mon, Fri; 2 mg all other days   Weekly warfarin total:   18 mg   No change documented:   Alexander Valiente RPH   Plan last modified:   Alexander Valiente RPH (2020)   Next INR check:   2020   Priority:   Maintenance   Target end date:   Indefinite    Indications    PAF (paroxysmal atrial fibrillation) (CMS/[facility]) [I48.0]             Anticoagulation Episode Summary     INR check location:       Preferred lab:       Send INR reminders to:    [facility] CL[facility] POOL    Comments:   [place] [facility] lab beginning 20      Anticoagulation Care Providers     Provider Role Specialty Phone number    Rommel Veliz MD Referring Cardiology [phone]          Clinic Interview:  Patient Findings     Negatives:   Signs/symptoms of thrombosis, Signs/symptoms of bleeding,   Laboratory test error suspected, Change in health, Change in alcohol use,   Change in activity, Upcoming invasive procedure, Emergency department   visit, Upcoming dental procedure, Missed doses, Extra doses, Change in   medications, Change in diet/appetite, Hospital admission, Bruising, Other   complaints      Clinical Outcomes     Negatives:   Major bleeding event, Thromboembolic event,   Anticoagulation-related hospital admission, Anticoagulation-related ED   visit, Anticoagulation-related fatality        INR History:  Anticoagulation Monitoring 2020   INR 2.50 2.80 2.30   INR Date 2020   INR Goal 2.0-3.0 2.0-3.0 2.0-3.0   Trend Same Same Same   Last Week Total 18 mg 18 mg 18 mg   Next Week Total 18 mg 18 mg 18 mg   Sun 2 mg 2 mg 2 mg   Mon 4 mg 4 mg 4 mg   Tue 2 mg 2 mg 2 mg   Wed 2 mg 2 mg 2 mg   Thu 2 mg 2 mg 2 mg   Fri 4 mg 4 mg 4 mg   Sat 2 mg 2 mg 2 mg   Visit Report - - -   Some recent  data might be hidden       Plan:  1. INR is Therapeutic today- see above in Anticoagulation Summary.   Will instruct Caitlyn Longoria to Continue their warfarin regimen- see above in Anticoagulation Summary.  2. Follow up in 4 weeks at [facility] (result to be relayed to [facility])  3. They have been instructed to call if any changes in medications, doses, concerns, etc. Patient expresses understanding and has no further questions at this time.    Alexander Valiente RPH

## 2020-06-25 ENCOUNTER — TELEPHONE (OUTPATIENT)
Dept: ORTHOPEDIC SURGERY | Facility: CLINIC | Age: 85
End: 2020-06-25

## 2020-06-25 DIAGNOSIS — Z96.652 STATUS POST LEFT KNEE REPLACEMENT: Primary | ICD-10-CM

## 2020-06-25 NOTE — TELEPHONE ENCOUNTER
Patient scheduled with [facility] on 8/13 for 1 YR FU / RIGHT Knee (rescheduled from 3/05 & 5/28 COVID-19). Patient asking if [facility] wants labs drawn before 8/13 Annual FU appt? like before previous annual FU on 5/30/19 ([facility] had ordered Sed rate & C-reactive protein). Patient can be reached at [phone]. Thanks /srh

## 2020-06-30 ENCOUNTER — EPISODE CHANGES (OUTPATIENT)
Dept: CASE MANAGEMENT | Facility: OTHER | Age: 85
End: 2020-06-30

## 2020-06-30 ENCOUNTER — PATIENT OUTREACH (OUTPATIENT)
Dept: CASE MANAGEMENT | Facility: OTHER | Age: 85
End: 2020-06-30

## 2020-06-30 NOTE — OUTREACH NOTE
Care Coordination Assessment    Documented/Reviewed By:  Ginna Navarro RN Date/time:  6/30/2020  1:08 PM   Assessment completed with:  patient  Enrolled in care management program:  No  Living arrangement:  alone  Support system:  family  Type of residence[place]  Home care services:  No (Comment: Pt had [facility] until 2 weeks ago)  Equipment used at home:  oxygen/respiratory treatment, walker (Comment: BIPAP and nebulizer)  Communication device:  No  Bed or wheelchair confined:  No  Inadequate nutrition:  No  Medication adherence problem:  No  Experiencing side effects from current medications:  No  History of fall(s) in last 6 months:  No  Difficulty keeping appointments:  No  Family aware of the patient's advance care planning wishes:  Yes  Jew or spiritual beliefs that impact treatment:  No

## 2020-06-30 NOTE — OUTREACH NOTE
Care Plan Note      Responses   Lifestyle Goals  Medication management   Barriers  Side effects of medication   Annual Wellness Visit:   Patient Will Schedule   Does patient have depression diagnosis?  No   Advanced Directives:  Patient Has        The main concerns and/or symptoms the patient would like to address are: fidencio, no concerns    Education/instruction provided by Care Coordinator: Very brief call with pt who states she is doing well after returning home from the hospital. States [facility] was coming but discharged her 2 weeks ago. Pt denies any questions or concerns and not interested in continued calls.     Follow Up Outreach Due:  none    Ginna Navarro RN  Ambulatory     6/30/2020, 13:12

## 2020-07-09 ENCOUNTER — OFFICE VISIT (OUTPATIENT)
Dept: CARDIOLOGY | Facility: CLINIC | Age: 85
End: 2020-07-09

## 2020-07-09 VITALS
HEART RATE: 87 BPM | WEIGHT: 217 LBS | HEIGHT: 62 IN | BODY MASS INDEX: 39.93 KG/M2 | DIASTOLIC BLOOD PRESSURE: 60 MMHG | SYSTOLIC BLOOD PRESSURE: 114 MMHG

## 2020-07-09 DIAGNOSIS — I48.91 ATRIAL FIBRILLATION AND FLUTTER (HCC): Primary | ICD-10-CM

## 2020-07-09 DIAGNOSIS — I50.22 CHRONIC SYSTOLIC CHF (CONGESTIVE HEART FAILURE), NYHA CLASS 3 (HCC): ICD-10-CM

## 2020-07-09 DIAGNOSIS — I25.5 CARDIOMYOPATHY, ISCHEMIC: ICD-10-CM

## 2020-07-09 DIAGNOSIS — I25.10 CORONARY ARTERY DISEASE INVOLVING NATIVE CORONARY ARTERY OF NATIVE HEART WITHOUT ANGINA PECTORIS: ICD-10-CM

## 2020-07-09 DIAGNOSIS — Z95.0 PACEMAKER: ICD-10-CM

## 2020-07-09 DIAGNOSIS — I48.0 PAF (PAROXYSMAL ATRIAL FIBRILLATION) (HCC): ICD-10-CM

## 2020-07-09 DIAGNOSIS — I48.92 ATRIAL FIBRILLATION AND FLUTTER (HCC): Primary | ICD-10-CM

## 2020-07-09 DIAGNOSIS — I49.5 SICK SINUS SYNDROME (HCC): ICD-10-CM

## 2020-07-09 PROCEDURE — 99214 OFFICE O/P EST MOD 30 MIN: CPT | Performed by: INTERNAL MEDICINE

## 2020-07-09 RX ORDER — PANTOPRAZOLE SODIUM 40 MG/1
40 TABLET, DELAYED RELEASE ORAL DAILY
COMMUNITY
Start: 2020-06-29 | End: 2021-09-14

## 2020-07-09 NOTE — PROGRESS NOTES
Date of Office Visit: 20  Encounter Provider: Rommel Veliz MD  Place of Service: [facility] CARDIOLOGY  Patient Name: Caitlyn Longoria  :1934  Referral Provider:No ref. provider found      Chief Complaint   Patient presents with   • Shortness of Breath     History of Present Illness  The patient is a pleasant 85-year-old white female with a history of diabetes and  hypertension who presented in 2008 with chest discomfort. In 2007, it  looked like she had had an anterior infarct. Echocardiogram then confirmed that. She then  had cardiac catheterization on 2008, which confirmed a left ventricular  ejection fraction of 35%, total occlusion of the mid left anterior descending, severe  disease of the large diagonal, and insignificant disease of the right coronary artery and  circumflex. She was admitted and had outpatient angioplasty and drug-eluting stent  placement of the diagonal. Unfortunately, she developed a right femoral pseudoaneurysm and  had to have that surgically repaired. Follow-up echocardiogram in  showed her left  ventricular function had returned to normal. She then presented on 2011, with  increasing shortness of breath and was found to have some mild congestive heart failure,  atrial flutter, and marked bradycardia. She had a repeat echocardiogram again that  confirmed normal left ventricular systolic function. She had a dual-chamber pacemaker  implanted. She continued to have episodes of atrial flutter with symptomatic dyspnea. She  underwent atrial flutter ablation in 2011.  She then presented in 2012 with dyspnea, thought that this might be heart  failure, anginal equivalence. She underwent cardiac catheterization which showed elevated  right-sided pressure with a PA systolic pressure of 53 mmHg, mean of 31 mmHg, but elevated  wedge pressure at 26. She was also found to have in-stent  stenosis of the diagonal vessel.  Other vessels were free of disease with the exception of total occlusion of the mid left  anterior descending which was unchanged. A left ventricular ejection fraction of  approximately 50%. She underwent angioplasty and drug-eluting stent placement of the  diagonal vessel. Prinivil was increased. We increased her Lasix to 30 mg a day however,  she developed dizziness on that and we had to cut her back to 20 mg of Lasix a day.  She unfortunately has had a couple of episodes of passing out. It turns out that was from low   blood pressure, and they resolved with adjusting her medications.  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 She ended up being admitted and had the device removed and cleaned  out. During that hospital stay she was found to have a nonfunctioning RV lead. She was  stable and in view of the infection, it was decided not to go and replace the lead at that  time.  She then in 10/2014 had her RV lead revised.   Then in November 2016 was having some increased shortness of breath had a perfusion stress test that showed a very small area of ischemia in the septal wall her echocardiogram showed a left ventricular ejection fraction of approximately 45% which was about her baseline no real significant valvular disease.  She isn't subsequently diagnosed as having significant gastroesophageal reflux with aspiration and been treated for that.    In December 2017 she was admitted to [facility] with a cerebral vascular accident she had an echocardiogram that confirmed severe left ventricular systolic dysfunction and ejection fraction of 23% no significant valvular disease aspirin was added to her regimen.  She's ended in our hospital with septic knees and infection treated with antibiotics and developed lymphedema of her lower extremity was seen in April 2018 emergency room for that.  She now comes  in for follow-up.  Unfortunately she has been in the hospital 2 times since we saw her with lung exacerbation.  Better pacer battery changed in February 2020.  She still short of breath with fairly minimal activity get her oxygen level was too high to get oxygen at home.  Her weights been running 2 13-2 15 without any significant change.  No palpitations no near syncope or syncope she has chronic lower extremity edema which is been unchanged.  No real orthopnea or PND.      Atrial Fibrillation   Symptoms include dizziness. Symptoms are negative for weakness. Past medical history includes atrial fibrillation and CAD.   Coronary Artery Disease   Symptoms include dizziness. Pertinent negatives include no muscle weakness or weight gain. Her past medical history is significant for past myocardial infarction.         Past Medical History:   Diagnosis Date   • Aortic calcification (CMS/HCC)     mild, on echo 12/17/2017   • Aortic regurgitation     Trace   • Asthma    • CAD (coronary artery disease)    • Chronic combined systolic and diastolic congestive heart failure (CMS/HCC)    • CKD (chronic kidney disease) stage 3, GFR 30-59 ml/min (CMS/HCC)    • Coronary artery disease involving native coronary artery of native heart with angina pectoris (CMS/HCC)    • DM type 2 (diabetes mellitus, type 2) (CMS/HCC)    • Hypertension    • Mild mitral regurgitation    • Mitral annular calcification     12/8/2017- echo, moderate   • PAF (paroxysmal atrial fibrillation) (CMS/HCC)    • SSS (sick sinus syndrome) (CMS/HCC)    • Tricuspid regurgitation     Trace         Past Surgical History:   Procedure Laterality Date   • CARDIAC CATHETERIZATION     • CARDIAC ELECTROPHYSIOLOGY PROCEDURE N/A 2/7/2020    Procedure: PPM generator change - dual  medtronic;  Surgeon: Kiel Field MD;  Location: [facility] INVASIVE LOCATION;  Service: Cardiology;  Laterality: N/A;   • CHOLECYSTECTOMY     • CORONARY STENT PLACEMENT     • HERNIA REPAIR       hital hernia   • HYSTERECTOMY     • PACEMAKER IMPLANTATION     • REPLACEMENT TOTAL KNEE Bilateral          Current Outpatient Medications on File Prior to Visit   Medication Sig Dispense Refill   • acetaminophen (TYLENOL) 650 MG 8 hr tablet Take 650 mg by mouth 2 (Two) Times a Day.     • Acetylcysteine 500 MG capsule Take 1,000 mg by mouth Daily With Breakfast & Dinner.     • albuterol (PROVENTIL) (2.5 MG/3ML) 0.083% nebulizer solution Take 2.5 mg by nebulization Every 4 (Four) Hours As Needed for Wheezing.     • albuterol sulfate  (90 Base) MCG/ACT inhaler Inhale 2 puffs Every 4 (Four) Hours As Needed for Wheezing. 1 inhaler 3   • amoxicillin (AMOXIL) 500 MG capsule Take 4 capsules by mouth 1 (One) Time As Needed (dentist) for up to 1 dose. 4 capsule 0   • aspirin 81 MG tablet Take 81 mg by mouth Daily.     • Benralizumab (FASENRA SC) Inject  under the skin into the appropriate area as directed. Gets one shot a month, next shot may 13 then one every 8 weeks     • Calcium Carbonate-Vitamin D (CALCIUM 500 + D PO) Take  by mouth.     • carvedilol (COREG) 3.125 MG tablet TAKE ONE TABLET BY MOUTH TWICE A  tablet 1   • cephalexin (KEFLEX) 500 MG capsule Take 500 mg by mouth 3 (Three) Times a Day.     • cetirizine (zyrTEC) 10 MG tablet Take 10 mg by mouth 2 (Two) Times a Day.     • fluticasone (FLONASE) 50 MCG/ACT nasal spray 1 spray into the nostril(s) as directed by provider 2 (Two) Times a Day.     • Fluticasone-Umeclidin-Vilant (TRELEGY ELLIPTA IN) Inhale Daily. As directed      • furosemide (LASIX) 20 MG tablet Take 20 mg by mouth Every Morning.     • glipiZIDE (GLUCOTROL) 5 MG tablet Take 2.5 mg by mouth Every Morning.     • ketoconazole (NIZORAL) 2 % cream Apply  topically to the appropriate area as directed Daily.     • losartan (COZAAR) 25 MG tablet TAKE ONE TABLET BY MOUTH DAILY 90 tablet 1   • Menthol, Topical Analgesic, (BIOFREEZE ROLL-ON EX) Apply  topically.     • montelukast (SINGULAIR) 10  MG tablet Take 10 mg by mouth Every Night.     • oxazepam (SERAX) 10 MG capsule Take 1 capsule by mouth Every Night. 5 capsule 0   • oxybutynin XL (DITROPAN-XL) 10 MG 24 hr tablet Take 10 mg by mouth every night at bedtime.     • pantoprazole (PROTONIX) 40 MG EC tablet Take 40 mg by mouth Daily.     • polyethyl glycol-propyl glycol (LUBRICATING EYE DROPS) 0.4-0.3 % solution ophthalmic solution Administer 1 drop to both eyes Every 1 (One) Hour As Needed.     • rosuvastatin (CRESTOR) 20 MG tablet Take 20 mg by mouth Every Night.     • warfarin (COUMADIN) 4 MG tablet Take one-half tablet (2 mg) by mouth Tues, Thurs, and Sat, and take one tablet (4 mg) by mouth all other days or as directed. 75 tablet 1   • [DISCONTINUED] Dupilumab (DUPIXENT) 300 MG/2ML solution prefilled syringe Inject  under the skin into the appropriate area as directed. evr other week, due this Friday 02/14/2020       No current facility-administered medications on file prior to visit.          Social History     Socioeconomic History   • Marital status: Single     Spouse name: Not on file   • Number of children: Not on file   • Years of education: Not on file   • Highest education level: Not on file   Tobacco Use   • Smoking status: Never Smoker   • Smokeless tobacco: Never Used   • Tobacco comment: caffeine use- soda   Substance and Sexual Activity   • Alcohol use: Not Currently   • Drug use: No   • Sexual activity: Defer   Lifestyle   • Physical activity:     Days per week: 0 days     Minutes per session: 0 min   • Stress: Only a little       Family History   Problem Relation Age of Onset   • Heart disease Mother    • Hypertension Mother    • Heart disease Father    • Hypertension Father    • Hypertension Brother    • Heart disease Brother    • Diabetes Niece          Review of Systems   Constitution: Positive for malaise/fatigue. Negative for decreased appetite, diaphoresis, fever, weight gain and weight loss.   HENT: Negative for congestion,  hearing loss, nosebleeds and tinnitus.    Eyes: Negative for blurred vision, double vision, vision loss in left eye, vision loss in right eye and visual disturbance.   Cardiovascular:        As noted in HPI   Respiratory:        As noted HPI   Endocrine: Negative for cold intolerance and heat intolerance.   Hematologic/Lymphatic: Negative for bleeding problem. Does not bruise/bleed easily.   Skin: Negative for color change, flushing, itching and rash.   Musculoskeletal: Positive for joint pain. Negative for arthritis, back pain, joint swelling, muscle weakness and myalgias.   Gastrointestinal: Negative for bloating, abdominal pain, constipation, diarrhea, dysphagia, heartburn, hematemesis, hematochezia, melena, nausea and vomiting.   Genitourinary: Negative for bladder incontinence, dysuria, frequency, nocturia and urgency.   Neurological: Positive for dizziness. Negative for focal weakness, headaches, light-headedness, loss of balance, numbness, paresthesias, vertigo and weakness.   Psychiatric/Behavioral: Negative for depression, memory loss and substance abuse.       Procedures    Procedures        Objective:    There were no vitals taken for this visit.       Physical Exam  Physical Exam   Constitutional: She is oriented to person, place, and time. She appears well-developed and well-nourished. No distress.   HENT:   Head: Normocephalic.   Eyes: Pupils are equal, round, and reactive to light. Conjunctivae are normal. No scleral icterus.   Neck: Normal carotid pulses, no hepatojugular reflux and no JVD present. Carotid bruit is not present. No tracheal deviation, no edema and no erythema present. No thyromegaly present.   Cardiovascular: Normal rate, regular rhythm, S1 normal, S2 normal, normal heart sounds and intact distal pulses.  No extrasystoles are present. PMI is not displaced. Exam reveals no gallop, no distant heart sounds and no friction rub.   No murmur heard.  Pulses:       Carotid pulses are 2+ on  the right side, and 2+ on the left side.       Radial pulses are 2+ on the right side, and 2+ on the left side.        Femoral pulses are 2+ on the right side, and 2+ on the left side.       Dorsalis pedis pulses are 2+ on the right side, and 2+ on the left side.        Posterior tibial pulses are 2+ on the right side, and 2+ on the left side.   Pulmonary/Chest: Effort normal. No respiratory distress. She has no decreased breath sounds. She has wheezes (diffuse scattered). She has rhonchi (diffuse scattered). She has no rales. She exhibits no tenderness.   Abdominal: Soft. Bowel sounds are normal. She exhibits no distension and no mass. There is no hepatosplenomegaly. There is no tenderness. There is no rebound and no guarding.   Musculoskeletal: She exhibits edema (1+ bilateral tibial, chronic discoloration.). She exhibits no tenderness or deformity.   Neurological: She is alert and oriented to person, place, and time.   Skin: Skin is warm and dry. No rash noted. She is not diaphoretic. No cyanosis or erythema. No pallor. Nails show no clubbing.   Psychiatric: She has a normal mood and affect. Her speech is normal and behavior is normal. Judgment and thought content normal.           Assessment:   1. This is a 85-year-old female with history of coronary disease over the mid left anterior descending with total occlusion of that. First diagonal vessel with severe  disease status post angioplasty and drug-eluting stent placement of that. Followup catheterization in 10/12 showed restenosis of the diagonal vessel with repeat angioplasty  and drug-eluting stent placement of that. Left ventricular ejection fraction of approximately 50% with segmental wall motion abnormality but at the time of that catheterization did  have elevated pulmonary artery pressure as well as pulmonary capillary wedge pressure. Echocardiogram May 2018 left ventricular ejection fraction improved from 23% to 35% to 40%.  No evidence of heart  failure.  She does continue the same will see us in follow-up in 6 months.   2. Atrial fibrillation/sick sinus syndrome status post permanent pacemaker implantation status post flutter ablation. S/P RV lead revision.  The patient's CHADS2-VASc score is 9.  Interestingly she is in sinus rhythm today continue same.  3. Diabetes mellitus, followed in your office.   4. Hypertensive. Blood pressure adequately controlled.   5. Hyperlipidemia. She is following with her PCP on target dose rosuvastatin.  6. History of sleep apnea, on CPAP.   7. History of pulmonary hypertension, follows with pulmonary.   8. Obesity as above.  9. Syncope. Secondary to hypotension. No recurrence.   10.  COPD with elevated left hemidiaphragm.  Multiple exacerbations of her lungs within the last year.  Continues to follow with pulmonary.  11.  Cerebral vascular accident most likely embolic from her atrial fibrillation now on aspirin.    12.  Recurrent infection I believe in her knee replacement now receiving antibiotics following with orthopedics as well as infectious disease.  13.  Lower extremity edema she had gone to lymphedema clinic.  This now is stable   14.  Ischemic cardiomyopathy left ventricular ejection fraction now 35-40% moderately reduced on echocardiogram May 2018 she is weighing herself daily those are stable she's to continue the same.   Thanks she will see our nurse practitioner in 6 months although she would really like to go year if stable will consider that after next visit.         Plan:

## 2020-07-13 RX ORDER — LOSARTAN POTASSIUM 25 MG/1
TABLET ORAL
Qty: 90 TABLET | Refills: 2 | Status: SHIPPED | OUTPATIENT
Start: 2020-07-13 | End: 2020-08-05

## 2020-07-22 ENCOUNTER — ANTICOAGULATION VISIT (OUTPATIENT)
Dept: PHARMACY | Facility: HOSPITAL | Age: 85
End: 2020-07-22

## 2020-07-22 DIAGNOSIS — I48.0 PAF (PAROXYSMAL ATRIAL FIBRILLATION) (HCC): ICD-10-CM

## 2020-07-22 LAB — INR PPP: 2.8

## 2020-07-22 NOTE — PROGRESS NOTES
Anticoagulation Clinic Progress Note    Anticoagulation Summary  As of 2020    INR goal:   2.0-3.0   TTR:   74.4 % (1.8 y)   INR used for dosin.80 (2020)   Warfarin maintenance plan:   4 mg every Mon, Fri; 2 mg all other days   Weekly warfarin total:   18 mg   No change documented:   Alexander Valiente RPH   Plan last modified:   Alexander Valiente RPH (2020)   Next INR check:   2020   Priority:   Maintenance   Target end date:   Indefinite    Indications    PAF (paroxysmal atrial fibrillation) (CMS/[facility]) [I48.0]             Anticoagulation Episode Summary     INR check location:       Preferred lab:       [facility]ers [facility]:    [facility]    Comments:   [facility] beginning 20      Anticoagulation Care Providers     Provider Role Specialty Phone number    Rommel Veliz MD Referring Cardiology [phone]          Clinic Interview:  Patient Findings     Positives:   Change in health, Change in medications    Negatives:   Signs/symptoms of thrombosis, Signs/symptoms of bleeding,   Laboratory test error suspected, Change in alcohol use, Change in   activity, Upcoming invasive procedure, Emergency department visit,   Upcoming dental procedure, Missed doses, Extra doses, Change in   diet/appetite, Hospital admission, Bruising, Other complaints    Comments:   Reports amoxicillin last week for UTI.       Clinical Outcomes     Negatives:   Major bleeding event, Thromboembolic event,   Anticoagulation-related hospital admission, Anticoagulation-related ED   visit, Anticoagulation-related fatality    Comments:   Reports amoxicillin last week for UTI.         INR History:  Anticoagulation Monitoring 2020   INR 2.80 2.30 2.80   INR Date 2020   INR Goal 2.0-3.0 2.0-3.0 2.0-3.0   Trend Same Same Same   Last Week Total 18 mg 18 mg 18 mg   Next Week Total 18 mg 18 mg 18 mg   Sun 2 mg 2 mg 2 mg   Mon 4 mg 4 mg 4 mg   Tue 2 mg 2 mg  2 mg   Wed 2 mg 2 mg 2 mg   Thu 2 mg 2 mg 2 mg   Fri 4 mg 4 mg 4 mg   Sat 2 mg 2 mg 2 mg   Visit Report - - -   Some recent data might be hidden       Plan:  1. INR is Therapeutic today- see above in Anticoagulation Summary.   Will instruct Caitlyn GARRETT Longoria to Continue their warfarin regimen- see above in Anticoagulation Summary.  2. Follow up in 4 weeks  3. They have been instructed to call if any changes in medications, doses, concerns, etc. Patient expresses understanding and has no further questions at this time.    Alexander Valiente, [facility]

## 2020-08-05 ENCOUNTER — TELEPHONE (OUTPATIENT)
Dept: CARDIOLOGY | Facility: CLINIC | Age: 85
End: 2020-08-05

## 2020-08-05 ENCOUNTER — LAB (OUTPATIENT)
Dept: LAB | Facility: HOSPITAL | Age: 85
End: 2020-08-05

## 2020-08-05 DIAGNOSIS — Z96.652 STATUS POST LEFT KNEE REPLACEMENT: ICD-10-CM

## 2020-08-05 LAB
CRP SERPL-MCNC: 0.05 MG/DL (ref 0–0.5)
ERYTHROCYTE [SEDIMENTATION RATE] IN BLOOD: 8 MM/HR (ref 0–30)

## 2020-08-05 PROCEDURE — 36415 COLL VENOUS BLD VENIPUNCTURE: CPT

## 2020-08-05 PROCEDURE — 86140 C-REACTIVE PROTEIN: CPT

## 2020-08-05 PROCEDURE — 85652 RBC SED RATE AUTOMATED: CPT

## 2020-08-05 RX ORDER — LOSARTAN POTASSIUM 25 MG/1
25 TABLET ORAL DAILY
Qty: 30 TABLET | Refills: 11 | Status: SHIPPED | OUTPATIENT
Start: 2020-08-05 | End: 2021-09-07

## 2020-08-05 NOTE — TELEPHONE ENCOUNTER
Patient called to ask about refilling Losartan.  The Losartan Potassium 25 mg was on Back Order on her last refill so we prescribed Losartan 25 mg without  Potassium.  I called and the pharamacy said they now have the Losartan with Potassium and the patient preferred to switch back.  Just clearing this with you to change back so that I have it in writing to do so. / KOBI

## 2020-08-13 ENCOUNTER — OFFICE VISIT (OUTPATIENT)
Dept: ORTHOPEDIC SURGERY | Facility: CLINIC | Age: 85
End: 2020-08-13

## 2020-08-13 VITALS — TEMPERATURE: 97.5 F | BODY MASS INDEX: 39.93 KG/M2 | WEIGHT: 217 LBS | HEIGHT: 62 IN

## 2020-08-13 DIAGNOSIS — M25.561 RIGHT KNEE PAIN, UNSPECIFIED CHRONICITY: Primary | ICD-10-CM

## 2020-08-13 PROCEDURE — 73562 X-RAY EXAM OF KNEE 3: CPT | Performed by: ORTHOPAEDIC SURGERY

## 2020-08-13 PROCEDURE — 99212 OFFICE O/P EST SF 10 MIN: CPT | Performed by: ORTHOPAEDIC SURGERY

## 2020-08-13 NOTE — PROGRESS NOTES
"Caitlyn Longoria : 1934 MRN: [id number] DATE: 2020    DIAGNOSIS: Annual follow up right total knee and infection     SUBJECTIVE:Patient returns today for  1 year follow up of right total knee replacement I & D and poly change. Patient reports doing well with no unusual complaints. Denies any limitations due to the knee.    OBJECTIVE:    Temp 97.5 °F (36.4 °C) (Temporal)   Ht 157.5 cm (62\")   Wt 98.4 kg (217 lb)   BMI 39.69 kg/m²   Family History   Problem Relation Age of Onset   • Heart disease Mother    • Hypertension Mother    • Heart disease Father    • Hypertension Father    • Hypertension Brother    • Heart disease Brother    • Diabetes Niece      Past Medical History:   Diagnosis Date   • Aortic calcification (CMS/HCC)     mild, on echo 2017   • Aortic regurgitation     Trace   • Asthma    • CAD (coronary artery disease)    • Chronic combined systolic and diastolic congestive heart failure (CMS/HCC)    • CKD (chronic kidney disease) stage 3, GFR 30-59 ml/min (CMS/HCC)    • Coronary artery disease involving native coronary artery of native heart with angina pectoris (CMS/HCC)    • DM type 2 (diabetes mellitus, type 2) (CMS/HCC)    • Hypertension    • Mild mitral regurgitation    • Mitral annular calcification     2017- echo, moderate   • PAF (paroxysmal atrial fibrillation) (CMS/HCC)    • SSS (sick sinus syndrome) (CMS/HCC)    • Tricuspid regurgitation     Trace     Past Surgical History:   Procedure Laterality Date   • CARDIAC CATHETERIZATION     • CARDIAC ELECTROPHYSIOLOGY PROCEDURE N/A 2020    Procedure: PPM generator change - dual  medtronic;  Surgeon: Kiel Field MD;  Location: [facility] INVASIVE LOCATION;  Service: Cardiology;  Laterality: N/A;   • CHOLECYSTECTOMY     • CORONARY STENT PLACEMENT     • HERNIA REPAIR      hital hernia   • HYSTERECTOMY     • PACEMAKER IMPLANTATION     • REPLACEMENT TOTAL KNEE Bilateral      Social History     Socioeconomic History "   • Marital status: Single     Spouse name: Not on file   • Number of children: Not on file   • Years of education: Not on file   • Highest education level: Not on file   Tobacco Use   • Smoking status: Never Smoker   • Smokeless tobacco: Never Used   • Tobacco comment: caffeine use- soda   Substance and Sexual Activity   • Alcohol use: Not Currently   • Drug use: No   • Sexual activity: Defer   Lifestyle   • Physical activity:     Days per week: 0 days     Minutes per session: 0 min   • Stress: Only a little     Review of Systems: 14 point review of systems performed, all systems negative     Exam:. The incision is well healed. Range of motion is measured at 0 to 120. The calf is soft and nontender with a negative Homans sign. Alignment is neutral. Good quad strength. There is no evidence of varus/valgus or flexion instability. No effusion. Intact to light touch with palpable distal pulses.     DIAGNOSTIC STUDIES  Xrays: 3 views(AP bilateral knees, lateral right, and sunrise bilateral knees) were ordered and reviewed for evaluation of right knee replacement. They demonstrate a well positioned, well aligned knee replacement without complicating factors noted. In comparison with previous films there has been no change.    ASSESSMENT: Annual follow up right knee replacement and I &D - doing well.    PLAN:  Continue activities as tolerated    Follow up NAKIA Hunter MD  8/13/2020

## 2020-08-14 ENCOUNTER — OFFICE VISIT (OUTPATIENT)
Dept: ONCOLOGY | Facility: CLINIC | Age: 85
End: 2020-08-14

## 2020-08-14 ENCOUNTER — LAB (OUTPATIENT)
Dept: LAB | Facility: HOSPITAL | Age: 85
End: 2020-08-14

## 2020-08-14 VITALS
BODY MASS INDEX: 39.91 KG/M2 | OXYGEN SATURATION: 93 % | RESPIRATION RATE: 18 BRPM | SYSTOLIC BLOOD PRESSURE: 115 MMHG | TEMPERATURE: 97.1 F | DIASTOLIC BLOOD PRESSURE: 74 MMHG | WEIGHT: 216.9 LBS | HEIGHT: 62 IN | HEART RATE: 96 BPM

## 2020-08-14 DIAGNOSIS — C91.10 CLL (CHRONIC LYMPHOCYTIC LEUKEMIA) (HCC): ICD-10-CM

## 2020-08-14 DIAGNOSIS — C91.10 CLL (CHRONIC LYMPHOCYTIC LEUKEMIA) (HCC): Primary | ICD-10-CM

## 2020-08-14 LAB
ALBUMIN SERPL-MCNC: 3.8 G/DL (ref 3.5–5.2)
ALBUMIN/GLOB SERPL: 1.6 G/DL (ref 1.1–2.4)
ALP SERPL-CCNC: 75 U/L (ref 38–116)
ALT SERPL W P-5'-P-CCNC: 12 U/L (ref 0–33)
ANION GAP SERPL CALCULATED.3IONS-SCNC: 13.5 MMOL/L (ref 5–15)
AST SERPL-CCNC: 20 U/L (ref 0–32)
BASOPHILS # BLD AUTO: 0.01 10*3/MM3 (ref 0–0.2)
BASOPHILS NFR BLD AUTO: 0.1 % (ref 0–1.5)
BILIRUB SERPL-MCNC: 1 MG/DL (ref 0.2–1.2)
BUN SERPL-MCNC: 20 MG/DL (ref 6–20)
BUN/CREAT SERPL: 18.2 (ref 7.3–30)
CALCIUM SPEC-SCNC: 9.1 MG/DL (ref 8.5–10.2)
CHLORIDE SERPL-SCNC: 104 MMOL/L (ref 98–107)
CO2 SERPL-SCNC: 23.5 MMOL/L (ref 22–29)
CREAT SERPL-MCNC: 1.1 MG/DL (ref 0.6–1.1)
DEPRECATED RDW RBC AUTO: 48.3 FL (ref 37–54)
EOSINOPHIL # BLD AUTO: 0 10*3/MM3 (ref 0–0.4)
EOSINOPHIL NFR BLD AUTO: 0 % (ref 0.3–6.2)
ERYTHROCYTE [DISTWIDTH] IN BLOOD BY AUTOMATED COUNT: 13.3 % (ref 12.3–15.4)
GFR SERPL CREATININE-BSD FRML MDRD: 47 ML/MIN/1.73
GLOBULIN UR ELPH-MCNC: 2.4 GM/DL (ref 1.8–3.5)
GLUCOSE SERPL-MCNC: 110 MG/DL (ref 74–124)
HCT VFR BLD AUTO: 37.1 % (ref 34–46.6)
HGB BLD-MCNC: 11.9 G/DL (ref 12–15.9)
IGA1 MFR SER: 16 MG/DL (ref 70–400)
IGG1 SER-MCNC: 539 MG/DL (ref 700–1600)
IGM SERPL-MCNC: 24 MG/DL (ref 40–230)
IMM GRANULOCYTES # BLD AUTO: 0.05 10*3/MM3 (ref 0–0.05)
IMM GRANULOCYTES NFR BLD AUTO: 0.4 % (ref 0–0.5)
LYMPHOCYTES # BLD AUTO: 5.85 10*3/MM3 (ref 0.7–3.1)
LYMPHOCYTES NFR BLD AUTO: 51.9 % (ref 19.6–45.3)
MCH RBC QN AUTO: 31.7 PG (ref 26.6–33)
MCHC RBC AUTO-ENTMCNC: 32.1 G/DL (ref 31.5–35.7)
MCV RBC AUTO: 98.9 FL (ref 79–97)
MONOCYTES # BLD AUTO: 0.37 10*3/MM3 (ref 0.1–0.9)
MONOCYTES NFR BLD AUTO: 3.3 % (ref 5–12)
NEUTROPHILS NFR BLD AUTO: 44.3 % (ref 42.7–76)
NEUTROPHILS NFR BLD AUTO: 5 10*3/MM3 (ref 1.7–7)
NRBC BLD AUTO-RTO: 0 /100 WBC (ref 0–0.2)
PLATELET # BLD AUTO: 182 10*3/MM3 (ref 140–450)
PMV BLD AUTO: 9.5 FL (ref 6–12)
POTASSIUM SERPL-SCNC: 3.6 MMOL/L (ref 3.5–4.7)
PROT SERPL-MCNC: 6.2 G/DL (ref 6.3–8)
RBC # BLD AUTO: 3.75 10*6/MM3 (ref 3.77–5.28)
SODIUM SERPL-SCNC: 141 MMOL/L (ref 134–145)
WBC # BLD AUTO: 11.28 10*3/MM3 (ref 3.4–10.8)

## 2020-08-14 PROCEDURE — 99213 OFFICE O/P EST LOW 20 MIN: CPT | Performed by: NURSE PRACTITIONER

## 2020-08-14 PROCEDURE — 85025 COMPLETE CBC W/AUTO DIFF WBC: CPT

## 2020-08-14 PROCEDURE — 80053 COMPREHEN METABOLIC PANEL: CPT

## 2020-08-14 PROCEDURE — 36415 COLL VENOUS BLD VENIPUNCTURE: CPT

## 2020-08-14 PROCEDURE — 82784 ASSAY IGA/IGD/IGG/IGM EACH: CPT | Performed by: INTERNAL MEDICINE

## 2020-08-14 NOTE — PROGRESS NOTES
Subjective .     Follow-up of chronic lymphocytic leukemia    Referring MD:    Amarilis Suarez MD    HISTORY OF PRESENT ILLNESS:  The patient is a 85 y.o. year old female who is here for follow-up with the above-mentioned history.    History of Present Illness   patient returns today for follow-up.  She denies any recent fevers, night sweats, or change in appetite.  Her weight is stable.  Her last white count was from 4/30/2020 at 22,000 though this is at the end of her hospitalization.  Her white count today has improved to 11,280.    She does continue with lung issues and utilizes nebulizer treatments.  She is followed by pulmonology.        HEMON HISTORY  Patient is an 85-year-old female whom I had seen in 2014 when she was hospitalized at [facility] and was diagnosed with chronic lymphocytic leukemia.  She has not been to see me in over 4 years and is following with Dr. Suarez her primary care doctor and her white count is gone up a little higher than usual to 22,000 and she is referred back to us for evaluation.  We are doing a telephone visit because of the coronavirus pandemic and her age and susceptibility.    When I originally saw her in 2014 she was brought into the ER unresponsive in septic shock on 03/25/2014 with methicillin-resistant staphylococcus identified in her blood cultures. The patient has been on antibiotic with improvement, but had some residual kidney damage with a creatinine in the 4-range requiring hemodialysis, and was noted on admission to have an elevated white count with lymphocytosis, normal hemoglobin, but a platelet count of 95,000, and over the course of the illness her platelet count normalized and the white count had gone up into the 20,000 range with lymphocytosis. A flow cytometry was sent which is consistent with CLL, she came to our office once or twice and then stopped coming because she was so stable     She tells me now she was hospitalized recently with rhinovirus infection  and has had diagnosed with asthma and is having a lot of respiratory issues but does not require oxygen.She is at the [facility] home in independent living and managing fairly well    She denies fever sweats or weight loss.  Her last white count was on 5/26/2020  Showed a white count of 18,000 with 66 lymphs and 27 neutrophils platelet white hemoglobin is 12.6 platelet 188,000    I reassured Christopher that no treatment is indicated for this level of leukocytosis from CLL and if she has no systemic complaints I do not feel strongly about doing CAT scans at her age but I would like to physically examine her in the office in a couple of months to make sure there is no obvious adenopathy or hepatosplenomegaly.    She does have recurrent infections and we can check quantitative immunoglobulins to see how low they are as another adjunctive option to prevent recurrent infections if she has hypogammaglobulinemia    She remains on Coumadin for atrial fibrillation    Past Medical History:   Diagnosis Date   • Aortic calcification (CMS/HCC)     mild, on echo 12/17/2017   • Aortic regurgitation     Trace   • Asthma    • Atrial fibrillation (CMS/HCC)    • CAD (coronary artery disease)    • Chronic combined systolic and diastolic congestive heart failure (CMS/HCC)    • CKD (chronic kidney disease) stage 3, GFR 30-59 ml/min (CMS/HCC)    • Coronary artery disease involving native coronary artery of native heart with angina pectoris (CMS/HCC)    • Disc degeneration, lumbar    • DM type 2 (diabetes mellitus, type 2) (CMS/HCC)    • GERD (gastroesophageal reflux disease)    • History of aneurysm     right femoral artery s/p LHC   • History of blood transfusion    • History of fracture    • History of vitamin D deficiency    • Hyperlipidemia    • Hypertension    • Mild mitral regurgitation    • Mitral annular calcification     12/8/2017- echo, moderate   • PAF (paroxysmal atrial fibrillation) (CMS/HCC)    • Peripheral neuropathy    • Skin  cancer     Left hand   • Sleep apnea    • SSS (sick sinus syndrome) (CMS/HCC)    • Stroke (cerebrum) (CMS/HCC)    • Tricuspid regurgitation     Trace       ONCOLOGIC HISTORY:  (History from previous dates can be found in the separate document.)    Current Outpatient Medications on File Prior to Visit   Medication Sig Dispense Refill   • acetaminophen (TYLENOL) 650 MG 8 hr tablet Take 650 mg by mouth 2 (Two) Times a Day.     • Acetylcysteine 500 MG capsule Take 1,000 mg by mouth Daily With Breakfast & Dinner.     • albuterol (PROVENTIL) (2.5 MG/3ML) 0.083% nebulizer solution Take 2.5 mg by nebulization Every 4 (Four) Hours As Needed for Wheezing.     • albuterol sulfate  (90 Base) MCG/ACT inhaler Inhale 2 puffs Every 4 (Four) Hours As Needed for Wheezing. 1 inhaler 3   • amoxicillin (AMOXIL) 500 MG capsule Take 4 capsules by mouth 1 (One) Time As Needed (dentist) for up to 1 dose. 4 capsule 0   • aspirin 81 MG tablet Take 81 mg by mouth Daily.     • Aspirin Buf,CaCarb-MgCarb-MgO, 81 MG tablet Take 81 mg by mouth Daily.     • Benralizumab (FASENRA SC) Inject  under the skin into the appropriate area as directed. Gets one shot a month, next shot may 13 then one every 8 weeks     • Calcium Carbonate-Vitamin D (CALCIUM 500 + D PO) Take  by mouth.     • carvedilol (COREG) 3.125 MG tablet TAKE ONE TABLET BY MOUTH TWICE A  tablet 1   • cephalexin (KEFLEX) 500 MG capsule Take 500 mg by mouth 3 (Three) Times a Day.     • cetirizine (zyrTEC) 10 MG tablet Take 10 mg by mouth 2 (Two) Times a Day.     • Dupilumab 300 MG/2ML solution prefilled syringe Inject  under the skin into the appropriate area as directed.     • fluticasone (FLONASE) 50 MCG/ACT nasal spray 1 spray into the nostril(s) as directed by provider 2 (Two) Times a Day.     • Fluticasone-Umeclidin-Vilant (TRELEGY ELLIPTA IN) Inhale Daily. As directed      • furosemide (LASIX) 20 MG tablet Take 20 mg by mouth Every Morning.     • glipiZIDE (GLUCOTROL) 5  MG tablet Take 2.5 mg by mouth Every Morning.     • ketoconazole (NIZORAL) 2 % cream Apply  topically to the appropriate area as directed Daily.     • losartan (COZAAR) 25 MG tablet Take 1 tablet by mouth Daily. 30 tablet 11   • Menthol, Topical Analgesic, (BIOFREEZE ROLL-ON EX) Apply  topically.     • montelukast (SINGULAIR) 10 MG tablet Take 10 mg by mouth Every Night.     • oxazepam (SERAX) 10 MG capsule Take 1 capsule by mouth Every Night. 5 capsule 0   • oxybutynin XL (DITROPAN-XL) 10 MG 24 hr tablet Take 10 mg by mouth every night at bedtime.     • pantoprazole (PROTONIX) 40 MG EC tablet Take 40 mg by mouth Daily.     • polyethyl glycol-propyl glycol (LUBRICATING EYE DROPS) 0.4-0.3 % solution ophthalmic solution Administer 1 drop to both eyes Every 1 (One) Hour As Needed.     • rosuvastatin (CRESTOR) 20 MG tablet Take 20 mg by mouth Every Night.     • warfarin (COUMADIN) 4 MG tablet Take one-half tablet (2 mg) by mouth Tues, Thurs, and Sat, and take one tablet (4 mg) by mouth all other days or as directed. 75 tablet 1     No current facility-administered medications on file prior to visit.        ALLERGIES:     Allergies   Allergen Reactions   • Accupril [Quinapril Hcl] Swelling, Other (See Comments), GI Intolerance and Delirium     HA, constipation    • Ahist [Chlorpheniramine] Nausea Only, Other (See Comments) and Dizziness     Headache, Blurred vision   • Clarithromycin Nausea Only, Other (See Comments) and Mental Status Change     HA, Depression, Flushing   • Esomeprazole GI Intolerance   • Levalbuterol Swelling   • Levocetirizine Diarrhea and GI Intolerance   • Lipitor [Atorvastatin] Other (See Comments) and Myalgia     Dark urine   • Omeprazole Nausea Only and Other (See Comments)     HA   • Pravachol [Pravastatin] Nausea Only and GI Intolerance     Bloated, Constipation, HA   • Sulindac Other (See Comments) and Myalgia     HA, joint pain, bruising   • Valdecoxib Irritability   • Chlorcyclizine Unknown -  Low Severity   • Diclofenac Sodium Unknown - Low Severity   • Diphenhydramine Unknown - Low Severity   • Lodine [Etodolac] Unknown - Low Severity   • Sulfa Antibiotics Unknown - Low Severity       Social History     Socioeconomic History   • Marital status: Single     Spouse name: Not on file   • Number of children: Not on file   • Years of education: Not on file   • Highest education level: Not on file   Occupational History     Employer: RETIRED   Tobacco Use   • Smoking status: Never Smoker   • Smokeless tobacco: Never Used   • Tobacco comment: caffeine use- soda   Substance and Sexual Activity   • Alcohol use: Not Currently   • Drug use: No   • Sexual activity: Defer   Lifestyle   • Physical activity:     Days per week: 0 days     Minutes per session: 0 min   • Stress: Only a little         Cancer-related family history includes Colon cancer in her sister.     Review of Systems   Constitutional: Positive for fatigue. Negative for appetite change, diaphoresis, fever and unexpected weight change.   Respiratory: Positive for cough, shortness of breath and wheezing.    Gastrointestinal: Negative.    Genitourinary: Negative.    Neurological: Negative.    Psychiatric/Behavioral: Negative.    All other systems reviewed and are negative.        Objective      Vitals:    08/14/20 1136   BP: 115/74   Pulse: 96   Resp: 18   Temp: 97.1 °F (36.2 °C)   SpO2: 93%     Current Status 8/14/2020   ECOG score 1     Pain Score    08/14/20 1136   PainSc: 0-No pain         Physical Exam   Constitutional: She is oriented to person, place, and time. She appears well-developed and well-nourished. No distress.   HENT:   Head: Normocephalic and atraumatic.   Eyes: Conjunctivae and EOM are normal.   Neck: Normal range of motion. No JVD present.   Cardiovascular: Normal rate and regular rhythm.   Pulmonary/Chest: Effort normal. No respiratory distress. She has wheezes.   Abdominal: Soft. Bowel sounds are normal.   Neurological: She is alert  and oriented to person, place, and time.   Skin: Skin is warm and dry.   Psychiatric: She has a normal mood and affect. Her behavior is normal.   Vitals reviewed.       RECENT LABS:  Hematology WBC   Date Value Ref Range Status   08/14/2020 11.28 (H) 3.40 - 10.80 10*3/mm3 Final   11/27/2019 11.55 (H) 4.5 - 11.0 10*3/uL Final     RBC   Date Value Ref Range Status   08/14/2020 3.75 (L) 3.77 - 5.28 10*6/mm3 Final   11/27/2019 4.05 4.0 - 5.2 10*6/uL Final     Hemoglobin   Date Value Ref Range Status   08/14/2020 11.9 (L) 12.0 - 15.9 g/dL Final   11/27/2019 13.0 12.0 - 16.0 g/dL Final     Hematocrit   Date Value Ref Range Status   08/14/2020 37.1 34.0 - 46.6 % Final   11/27/2019 43.1 36.0 - 46.0 % Final     Platelets   Date Value Ref Range Status   08/14/2020 182 140 - 450 10*3/mm3 Final   11/27/2019 205 140 - 440 10*3/uL Final        Assessment/Plan    1. Chronic lymphocytic leukemia  -white count has improved today to 11,280.  She is slightly anemic at 11.9 we will continue to monitor this.    2. We will check quantitative immunoglobulins to see if IVIG replacement may help with recurrent infections. Pending today    Plan  1.  Follow-up with Dr Larkin in 2 months.            KRISTIN Collazo  ADDENDUM: Discussed the case with Dr. Larkin and reviewed immunoglobulin levels with IgG over 500 we do not need IVIG since the patient also has not had repeated infections since her recovery from her hospitalization.  We will keep follow-up as planned in 2 months.  Patient informed.    Cc:  No ref. provider found

## 2020-08-19 ENCOUNTER — ANTICOAGULATION VISIT (OUTPATIENT)
Dept: PHARMACY | Facility: HOSPITAL | Age: 85
End: 2020-08-19

## 2020-08-19 DIAGNOSIS — I48.0 PAF (PAROXYSMAL ATRIAL FIBRILLATION) (HCC): ICD-10-CM

## 2020-08-19 LAB — INR PPP: 2.6

## 2020-08-19 NOTE — PROGRESS NOTES
Anticoagulation Clinic Progress Note    Anticoagulation Summary  As of 2020    INR goal:   2.0-3.0   TTR:   75.4 % (1.8 y)   INR used for dosin.60 (2020)   Warfarin maintenance plan:   4 mg every Mon, Fri; 2 mg all other days   Weekly warfarin total:   18 mg   Plan last modified:   Alexander Valiente RP (2020)   Next INR check:   2020   Priority:   Maintenance   Target end date:   Indefinite    Indications    PAF (paroxysmal atrial fibrillation) (CMS/HCC) [I48.0]             Anticoagulation Episode Summary     INR check location:       Preferred lab:       Send INR reminders to:   [facility] CLINICAL POOL    Comments:   [facility] lab beginning 20      Anticoagulation Care Providers     Provider Role Specialty Phone number    Rommel Veliz MD Referring Cardiology [phone]          Clinic Interview:      INR History:  Anticoagulation Monitoring 2020   INR 2.30 2.80 2.60   INR Date 2020   INR Goal 2.0-3.0 2.0-3.0 2.0-3.0   Trend Same Same Same   Last Week Total 18 mg 18 mg 18 mg   Next Week Total 18 mg 18 mg 18 mg   Sun 2 mg 2 mg 2 mg   Mon 4 mg 4 mg 4 mg   Tue 2 mg 2 mg 2 mg   Wed 2 mg 2 mg 2 mg   Thu 2 mg 2 mg 2 mg   Fri 4 mg 4 mg 4 mg   Sat 2 mg 2 mg 2 mg   Visit Report - - -   Some recent data might be hidden       Plan:  1. INR is Therapeutic today- see above in Anticoagulation Summary.   Will instruct Caitlyn Longoria to Continue their warfarin regimen- see above in Anticoagulation Summary.  2. Follow up in 4 weeks--[facility] to do INR  3. Left secure  today with instructions. They have been instructed to call if any changes in medications, doses, concerns, etc.     Pao Levi [facility]

## 2020-08-21 ENCOUNTER — TELEPHONE (OUTPATIENT)
Dept: ONCOLOGY | Facility: CLINIC | Age: 85
End: 2020-08-21

## 2020-08-21 NOTE — TELEPHONE ENCOUNTER
Patient called and informed no need for IVIG currently since she has not had repeated infections in her IgG level is greater than 500.  Patient will return in 2 months for follow-up with Dr. Larkin as planned.

## 2020-08-29 ENCOUNTER — APPOINTMENT (OUTPATIENT)
Dept: GENERAL RADIOLOGY | Facility: HOSPITAL | Age: 85
End: 2020-08-29

## 2020-08-29 ENCOUNTER — HOSPITAL ENCOUNTER (INPATIENT)
Facility: HOSPITAL | Age: 85
LOS: 2 days | Discharge: HOME | End: 2020-08-31
Attending: EMERGENCY MEDICINE | Admitting: INTERNAL MEDICINE

## 2020-08-29 DIAGNOSIS — R09.02 HYPOXIA: Primary | ICD-10-CM

## 2020-08-29 DIAGNOSIS — B34.8 RHINOVIRUS: ICD-10-CM

## 2020-08-29 DIAGNOSIS — D72.829 LEUKOCYTOSIS, UNSPECIFIED TYPE: ICD-10-CM

## 2020-08-29 LAB
ALBUMIN SERPL-MCNC: 4.3 G/DL (ref 3.5–5.2)
ALBUMIN/GLOB SERPL: 1.7 G/DL
ALP SERPL-CCNC: 92 U/L (ref 39–117)
ALT SERPL W P-5'-P-CCNC: 19 U/L (ref 1–33)
ANION GAP SERPL CALCULATED.3IONS-SCNC: 15.7 MMOL/L (ref 5–15)
AST SERPL-CCNC: 16 U/L (ref 1–32)
B PARAPERT DNA SPEC QL NAA+PROBE: NOT DETECTED
B PERT DNA SPEC QL NAA+PROBE: NOT DETECTED
BILIRUB SERPL-MCNC: 0.7 MG/DL (ref 0–1.2)
BUN SERPL-MCNC: 21 MG/DL (ref 8–23)
BUN/CREAT SERPL: 21.4 (ref 7–25)
C PNEUM DNA NPH QL NAA+NON-PROBE: NOT DETECTED
CALCIUM SPEC-SCNC: 9.4 MG/DL (ref 8.6–10.5)
CHLORIDE SERPL-SCNC: 104 MMOL/L (ref 98–107)
CO2 SERPL-SCNC: 22.3 MMOL/L (ref 22–29)
CREAT SERPL-MCNC: 0.98 MG/DL (ref 0.57–1)
D-LACTATE SERPL-SCNC: 2 MMOL/L (ref 0.5–2)
DEPRECATED RDW RBC AUTO: 43.3 FL (ref 37–54)
ERYTHROCYTE [DISTWIDTH] IN BLOOD BY AUTOMATED COUNT: 12.2 % (ref 12.3–15.4)
FLUAV H1 2009 PAND RNA NPH QL NAA+PROBE: NOT DETECTED
FLUAV H1 HA GENE NPH QL NAA+PROBE: NOT DETECTED
FLUAV H3 RNA NPH QL NAA+PROBE: NOT DETECTED
FLUAV SUBTYP SPEC NAA+PROBE: NOT DETECTED
FLUBV RNA ISLT QL NAA+PROBE: NOT DETECTED
GFR SERPL CREATININE-BSD FRML MDRD: 54 ML/MIN/1.73
GLOBULIN UR ELPH-MCNC: 2.5 GM/DL
GLUCOSE SERPL-MCNC: 145 MG/DL (ref 65–99)
HADV DNA SPEC NAA+PROBE: NOT DETECTED
HCOV 229E RNA SPEC QL NAA+PROBE: NOT DETECTED
HCOV HKU1 RNA SPEC QL NAA+PROBE: NOT DETECTED
HCOV NL63 RNA SPEC QL NAA+PROBE: NOT DETECTED
HCOV OC43 RNA SPEC QL NAA+PROBE: NOT DETECTED
HCT VFR BLD AUTO: 38.6 % (ref 34–46.6)
HGB BLD-MCNC: 12.8 G/DL (ref 12–15.9)
HMPV RNA NPH QL NAA+NON-PROBE: NOT DETECTED
HPIV1 RNA SPEC QL NAA+PROBE: NOT DETECTED
HPIV2 RNA SPEC QL NAA+PROBE: NOT DETECTED
HPIV3 RNA NPH QL NAA+PROBE: NOT DETECTED
HPIV4 P GENE NPH QL NAA+PROBE: NOT DETECTED
INR PPP: 2.54 (ref 0.9–1.1)
INR PPP: 2.58 (ref 0.9–1.1)
LYMPHOCYTES # BLD MANUAL: 7.08 10*3/MM3 (ref 0.7–3.1)
LYMPHOCYTES NFR BLD MANUAL: 1 % (ref 5–12)
LYMPHOCYTES NFR BLD MANUAL: 38 % (ref 19.6–45.3)
M PNEUMO IGG SER IA-ACNC: NOT DETECTED
MCH RBC QN AUTO: 31.8 PG (ref 26.6–33)
MCHC RBC AUTO-ENTMCNC: 33.2 G/DL (ref 31.5–35.7)
MCV RBC AUTO: 96 FL (ref 79–97)
MONOCYTES # BLD AUTO: 0.19 10*3/MM3 (ref 0.1–0.9)
NEUTROPHILS # BLD AUTO: 11.36 10*3/MM3 (ref 1.7–7)
NEUTROPHILS NFR BLD MANUAL: 61 % (ref 42.7–76)
NT-PROBNP SERPL-MCNC: 440.7 PG/ML (ref 0–1800)
PLAT MORPH BLD: NORMAL
PLATELET # BLD AUTO: 210 10*3/MM3 (ref 140–450)
PMV BLD AUTO: 10 FL (ref 6–12)
POTASSIUM SERPL-SCNC: 3.4 MMOL/L (ref 3.5–5.2)
PROCALCITONIN SERPL-MCNC: 0.05 NG/ML (ref 0–0.25)
PROT SERPL-MCNC: 6.8 G/DL (ref 6–8.5)
PROTHROMBIN TIME: 26.5 SECONDS (ref 11.7–14.2)
PROTHROMBIN TIME: 26.9 SECONDS (ref 11.7–14.2)
RBC # BLD AUTO: 4.02 10*6/MM3 (ref 3.77–5.28)
RBC MORPH BLD: NORMAL
RHINOVIRUS RNA SPEC NAA+PROBE: DETECTED
RSV RNA NPH QL NAA+NON-PROBE: NOT DETECTED
SARS-COV-2 RNA NPH QL NAA+NON-PROBE: NOT DETECTED
SMUDGE CELLS BLD QL SMEAR: ABNORMAL
SODIUM SERPL-SCNC: 142 MMOL/L (ref 136–145)
TROPONIN T SERPL-MCNC: <0.01 NG/ML (ref 0–0.03)
WBC # BLD AUTO: 18.62 10*3/MM3 (ref 3.4–10.8)

## 2020-08-29 PROCEDURE — 93005 ELECTROCARDIOGRAM TRACING: CPT | Performed by: EMERGENCY MEDICINE

## 2020-08-29 PROCEDURE — 94799 UNLISTED PULMONARY SVC/PX: CPT

## 2020-08-29 PROCEDURE — 85007 BL SMEAR W/DIFF WBC COUNT: CPT | Performed by: PHYSICIAN ASSISTANT

## 2020-08-29 PROCEDURE — 83605 ASSAY OF LACTIC ACID: CPT | Performed by: PHYSICIAN ASSISTANT

## 2020-08-29 PROCEDURE — 71045 X-RAY EXAM CHEST 1 VIEW: CPT

## 2020-08-29 PROCEDURE — 99284 EMERGENCY DEPT VISIT MOD MDM: CPT

## 2020-08-29 PROCEDURE — 85610 PROTHROMBIN TIME: CPT | Performed by: PHYSICIAN ASSISTANT

## 2020-08-29 PROCEDURE — 85610 PROTHROMBIN TIME: CPT | Performed by: INTERNAL MEDICINE

## 2020-08-29 PROCEDURE — 80053 COMPREHEN METABOLIC PANEL: CPT | Performed by: PHYSICIAN ASSISTANT

## 2020-08-29 PROCEDURE — 85025 COMPLETE CBC W/AUTO DIFF WBC: CPT | Performed by: PHYSICIAN ASSISTANT

## 2020-08-29 PROCEDURE — 94640 AIRWAY INHALATION TREATMENT: CPT

## 2020-08-29 PROCEDURE — 25010000002 CEFTRIAXONE PER 250 MG: Performed by: PHYSICIAN ASSISTANT

## 2020-08-29 PROCEDURE — 84145 PROCALCITONIN (PCT): CPT | Performed by: PHYSICIAN ASSISTANT

## 2020-08-29 PROCEDURE — 83880 ASSAY OF NATRIURETIC PEPTIDE: CPT | Performed by: PHYSICIAN ASSISTANT

## 2020-08-29 PROCEDURE — 93010 ELECTROCARDIOGRAM REPORT: CPT | Performed by: INTERNAL MEDICINE

## 2020-08-29 PROCEDURE — 0202U NFCT DS 22 TRGT SARS-COV-2: CPT | Performed by: PHYSICIAN ASSISTANT

## 2020-08-29 PROCEDURE — 84484 ASSAY OF TROPONIN QUANT: CPT | Performed by: PHYSICIAN ASSISTANT

## 2020-08-29 PROCEDURE — 87040 BLOOD CULTURE FOR BACTERIA: CPT | Performed by: PHYSICIAN ASSISTANT

## 2020-08-29 RX ORDER — CALCIUM CARBONATE 200(500)MG
2 TABLET,CHEWABLE ORAL 3 TIMES DAILY PRN
Status: DISCONTINUED | OUTPATIENT
Start: 2020-08-29 | End: 2020-08-31 | Stop reason: HOSPADM

## 2020-08-29 RX ORDER — CARVEDILOL 3.12 MG/1
3.12 TABLET ORAL 2 TIMES DAILY
Status: DISCONTINUED | OUTPATIENT
Start: 2020-08-29 | End: 2020-08-31 | Stop reason: HOSPADM

## 2020-08-29 RX ORDER — FUROSEMIDE 20 MG/1
20 TABLET ORAL DAILY
Status: DISCONTINUED | OUTPATIENT
Start: 2020-08-30 | End: 2020-08-31 | Stop reason: HOSPADM

## 2020-08-29 RX ORDER — LOPERAMIDE HYDROCHLORIDE 2 MG/1
2 CAPSULE ORAL DAILY
Status: DISCONTINUED | OUTPATIENT
Start: 2020-08-30 | End: 2020-08-31 | Stop reason: HOSPADM

## 2020-08-29 RX ORDER — LOPERAMIDE HYDROCHLORIDE 2 MG/1
2 CAPSULE ORAL DAILY
COMMUNITY
End: 2020-10-14 | Stop reason: HOSPADM

## 2020-08-29 RX ORDER — WARFARIN SODIUM 2 MG/1
2 TABLET ORAL
Status: DISCONTINUED | OUTPATIENT
Start: 2020-08-29 | End: 2020-08-30 | Stop reason: DRUGHIGH

## 2020-08-29 RX ORDER — OXYBUTYNIN CHLORIDE 10 MG/1
10 TABLET, EXTENDED RELEASE ORAL NIGHTLY
Status: DISCONTINUED | OUTPATIENT
Start: 2020-08-29 | End: 2020-08-31 | Stop reason: HOSPADM

## 2020-08-29 RX ORDER — CARBOXYMETHYLCELLULOSE SODIUM 10 MG/ML
1 GEL OPHTHALMIC 4 TIMES DAILY PRN
Status: DISCONTINUED | OUTPATIENT
Start: 2020-08-29 | End: 2020-08-31 | Stop reason: HOSPADM

## 2020-08-29 RX ORDER — BUDESONIDE AND FORMOTEROL FUMARATE DIHYDRATE 160; 4.5 UG/1; UG/1
2 AEROSOL RESPIRATORY (INHALATION)
Status: DISCONTINUED | OUTPATIENT
Start: 2020-08-29 | End: 2020-08-31 | Stop reason: HOSPADM

## 2020-08-29 RX ORDER — MONTELUKAST SODIUM 10 MG/1
10 TABLET ORAL NIGHTLY
Status: DISCONTINUED | OUTPATIENT
Start: 2020-08-29 | End: 2020-08-31 | Stop reason: HOSPADM

## 2020-08-29 RX ORDER — SODIUM CHLORIDE FOR INHALATION 7 %
4 VIAL, NEBULIZER (ML) INHALATION
Status: DISCONTINUED | OUTPATIENT
Start: 2020-08-29 | End: 2020-08-31 | Stop reason: HOSPADM

## 2020-08-29 RX ORDER — ACETAMINOPHEN 325 MG/1
650 TABLET ORAL EVERY 4 HOURS PRN
Status: DISCONTINUED | OUTPATIENT
Start: 2020-08-29 | End: 2020-08-31 | Stop reason: HOSPADM

## 2020-08-29 RX ORDER — CHLORAL HYDRATE 500 MG
1000 CAPSULE ORAL 2 TIMES DAILY WITH MEALS
COMMUNITY
End: 2021-09-14

## 2020-08-29 RX ORDER — ASPIRIN 81 MG/1
81 TABLET ORAL DAILY
Status: DISCONTINUED | OUTPATIENT
Start: 2020-08-30 | End: 2020-08-31 | Stop reason: HOSPADM

## 2020-08-29 RX ORDER — FLUTICASONE PROPIONATE 50 MCG
1 SPRAY, SUSPENSION (ML) NASAL 2 TIMES DAILY
Status: DISCONTINUED | OUTPATIENT
Start: 2020-08-29 | End: 2020-08-31 | Stop reason: HOSPADM

## 2020-08-29 RX ORDER — ROSUVASTATIN CALCIUM 20 MG/1
20 TABLET, COATED ORAL NIGHTLY
Status: DISCONTINUED | OUTPATIENT
Start: 2020-08-29 | End: 2020-08-31 | Stop reason: HOSPADM

## 2020-08-29 RX ORDER — ALBUTEROL SULFATE 2.5 MG/3ML
2.5 SOLUTION RESPIRATORY (INHALATION) EVERY 4 HOURS PRN
Status: DISCONTINUED | OUTPATIENT
Start: 2020-08-29 | End: 2020-08-31 | Stop reason: HOSPADM

## 2020-08-29 RX ORDER — LOSARTAN POTASSIUM 25 MG/1
25 TABLET ORAL DAILY
Status: DISCONTINUED | OUTPATIENT
Start: 2020-08-30 | End: 2020-08-31 | Stop reason: HOSPADM

## 2020-08-29 RX ORDER — ONDANSETRON 2 MG/ML
4 INJECTION INTRAMUSCULAR; INTRAVENOUS EVERY 6 HOURS PRN
Status: DISCONTINUED | OUTPATIENT
Start: 2020-08-29 | End: 2020-08-31 | Stop reason: HOSPADM

## 2020-08-29 RX ORDER — CETIRIZINE HYDROCHLORIDE 10 MG/1
10 TABLET ORAL 2 TIMES DAILY
Status: DISCONTINUED | OUTPATIENT
Start: 2020-08-29 | End: 2020-08-31 | Stop reason: HOSPADM

## 2020-08-29 RX ORDER — SODIUM CHLORIDE 0.9 % (FLUSH) 0.9 %
10 SYRINGE (ML) INJECTION EVERY 12 HOURS SCHEDULED
Status: DISCONTINUED | OUTPATIENT
Start: 2020-08-29 | End: 2020-08-31 | Stop reason: HOSPADM

## 2020-08-29 RX ORDER — ACETAMINOPHEN 160 MG/5ML
650 SOLUTION ORAL EVERY 4 HOURS PRN
Status: DISCONTINUED | OUTPATIENT
Start: 2020-08-29 | End: 2020-08-31 | Stop reason: HOSPADM

## 2020-08-29 RX ORDER — GLIPIZIDE 5 MG/1
2.5 TABLET ORAL EVERY MORNING
Status: DISCONTINUED | OUTPATIENT
Start: 2020-08-30 | End: 2020-08-31 | Stop reason: HOSPADM

## 2020-08-29 RX ORDER — PREDNISONE 20 MG/1
40 TABLET ORAL
Status: DISCONTINUED | OUTPATIENT
Start: 2020-08-30 | End: 2020-08-31 | Stop reason: HOSPADM

## 2020-08-29 RX ORDER — PANTOPRAZOLE SODIUM 40 MG/1
40 TABLET, DELAYED RELEASE ORAL EVERY MORNING
Status: DISCONTINUED | OUTPATIENT
Start: 2020-08-30 | End: 2020-08-31 | Stop reason: HOSPADM

## 2020-08-29 RX ORDER — SODIUM CHLORIDE 0.9 % (FLUSH) 0.9 %
10 SYRINGE (ML) INJECTION AS NEEDED
Status: DISCONTINUED | OUTPATIENT
Start: 2020-08-29 | End: 2020-08-31 | Stop reason: HOSPADM

## 2020-08-29 RX ORDER — ACETAMINOPHEN 650 MG/1
650 SUPPOSITORY RECTAL EVERY 4 HOURS PRN
Status: DISCONTINUED | OUTPATIENT
Start: 2020-08-29 | End: 2020-08-31 | Stop reason: HOSPADM

## 2020-08-29 RX ORDER — OXAZEPAM 10 MG
10 CAPSULE ORAL NIGHTLY
Status: DISCONTINUED | OUTPATIENT
Start: 2020-08-29 | End: 2020-08-31 | Stop reason: HOSPADM

## 2020-08-29 RX ORDER — CEFTRIAXONE SODIUM 1 G/50ML
1 INJECTION, SOLUTION INTRAVENOUS ONCE
Status: COMPLETED | OUTPATIENT
Start: 2020-08-29 | End: 2020-08-29

## 2020-08-29 RX ADMIN — ROSUVASTATIN CALCIUM 20 MG: 20 TABLET, FILM COATED ORAL at 21:57

## 2020-08-29 RX ADMIN — CARVEDILOL 3.12 MG: 3.12 TABLET, FILM COATED ORAL at 21:58

## 2020-08-29 RX ADMIN — OXAZEPAM 10 MG: 10 CAPSULE, GELATIN COATED ORAL at 21:58

## 2020-08-29 RX ADMIN — ALBUTEROL SULFATE 2.5 MG: 2.5 SOLUTION RESPIRATORY (INHALATION) at 20:45

## 2020-08-29 RX ADMIN — SODIUM CHLORIDE, PRESERVATIVE FREE 10 ML: 5 INJECTION INTRAVENOUS at 21:58

## 2020-08-29 RX ADMIN — DOXYCYCLINE 100 MG: 100 INJECTION, POWDER, LYOPHILIZED, FOR SOLUTION INTRAVENOUS at 13:00

## 2020-08-29 RX ADMIN — SODIUM CHLORIDE 4 ML: 7 NEBU SOLN,3 % NEBU at 20:48

## 2020-08-29 RX ADMIN — FLUTICASONE PROPIONATE 1 SPRAY: 50 SPRAY, METERED NASAL at 21:58

## 2020-08-29 RX ADMIN — MONTELUKAST SODIUM 10 MG: 10 TABLET, FILM COATED ORAL at 21:58

## 2020-08-29 RX ADMIN — CEFTRIAXONE SODIUM 1 G: 1 INJECTION, SOLUTION INTRAVENOUS at 13:48

## 2020-08-29 RX ADMIN — WARFARIN 2 MG: 2 TABLET ORAL at 19:51

## 2020-08-29 RX ADMIN — CETIRIZINE HYDROCHLORIDE 10 MG: 10 TABLET ORAL at 21:57

## 2020-08-29 RX ADMIN — OXYBUTYNIN CHLORIDE 10 MG: 10 TABLET, EXTENDED RELEASE ORAL at 21:57

## 2020-08-30 LAB
INR PPP: 2.7 (ref 0.9–1.1)
PROTHROMBIN TIME: 27.8 SECONDS (ref 11.7–14.2)

## 2020-08-30 PROCEDURE — 85610 PROTHROMBIN TIME: CPT | Performed by: INTERNAL MEDICINE

## 2020-08-30 PROCEDURE — 63710000001 PREDNISONE PER 1 MG: Performed by: INTERNAL MEDICINE

## 2020-08-30 PROCEDURE — 94799 UNLISTED PULMONARY SVC/PX: CPT

## 2020-08-30 PROCEDURE — 99223 1ST HOSP IP/OBS HIGH 75: CPT | Performed by: INTERNAL MEDICINE

## 2020-08-30 PROCEDURE — 94640 AIRWAY INHALATION TREATMENT: CPT

## 2020-08-30 RX ORDER — WARFARIN SODIUM 4 MG/1
4 TABLET ORAL
Status: COMPLETED | OUTPATIENT
Start: 2020-08-30 | End: 2020-08-30

## 2020-08-30 RX ADMIN — SODIUM CHLORIDE, PRESERVATIVE FREE 10 ML: 5 INJECTION INTRAVENOUS at 09:18

## 2020-08-30 RX ADMIN — ALBUTEROL SULFATE 2.5 MG: 2.5 SOLUTION RESPIRATORY (INHALATION) at 22:16

## 2020-08-30 RX ADMIN — CARVEDILOL 3.12 MG: 3.12 TABLET, FILM COATED ORAL at 08:06

## 2020-08-30 RX ADMIN — OXYBUTYNIN CHLORIDE 10 MG: 10 TABLET, EXTENDED RELEASE ORAL at 21:04

## 2020-08-30 RX ADMIN — CETIRIZINE HYDROCHLORIDE 10 MG: 10 TABLET ORAL at 21:01

## 2020-08-30 RX ADMIN — BUDESONIDE AND FORMOTEROL FUMARATE DIHYDRATE 2 PUFF: 160; 4.5 AEROSOL RESPIRATORY (INHALATION) at 20:20

## 2020-08-30 RX ADMIN — GLIPIZIDE 2.5 MG: 5 TABLET ORAL at 08:06

## 2020-08-30 RX ADMIN — FLUTICASONE PROPIONATE 1 SPRAY: 50 SPRAY, METERED NASAL at 08:09

## 2020-08-30 RX ADMIN — ASPIRIN 81 MG: 81 TABLET, COATED ORAL at 08:06

## 2020-08-30 RX ADMIN — CARVEDILOL 3.12 MG: 3.12 TABLET, FILM COATED ORAL at 21:01

## 2020-08-30 RX ADMIN — FUROSEMIDE 20 MG: 20 TABLET ORAL at 10:17

## 2020-08-30 RX ADMIN — CETIRIZINE HYDROCHLORIDE 10 MG: 10 TABLET ORAL at 08:06

## 2020-08-30 RX ADMIN — ROSUVASTATIN CALCIUM 20 MG: 20 TABLET, FILM COATED ORAL at 21:00

## 2020-08-30 RX ADMIN — WARFARIN 4 MG: 4 TABLET ORAL at 16:43

## 2020-08-30 RX ADMIN — PANTOPRAZOLE SODIUM 40 MG: 40 TABLET, DELAYED RELEASE ORAL at 06:12

## 2020-08-30 RX ADMIN — FLUTICASONE PROPIONATE 1 SPRAY: 50 SPRAY, METERED NASAL at 21:04

## 2020-08-30 RX ADMIN — SODIUM CHLORIDE, PRESERVATIVE FREE 10 ML: 5 INJECTION INTRAVENOUS at 21:04

## 2020-08-30 RX ADMIN — LOSARTAN POTASSIUM 25 MG: 25 TABLET, FILM COATED ORAL at 08:05

## 2020-08-30 RX ADMIN — LOPERAMIDE HYDROCHLORIDE 2 MG: 2 CAPSULE ORAL at 08:05

## 2020-08-30 RX ADMIN — PREDNISONE 40 MG: 20 TABLET ORAL at 08:06

## 2020-08-30 RX ADMIN — BUDESONIDE AND FORMOTEROL FUMARATE DIHYDRATE 2 PUFF: 160; 4.5 AEROSOL RESPIRATORY (INHALATION) at 07:23

## 2020-08-30 RX ADMIN — TIOTROPIUM BROMIDE INHALATION SPRAY 2 PUFF: 3.12 SPRAY, METERED RESPIRATORY (INHALATION) at 07:24

## 2020-08-30 RX ADMIN — MONTELUKAST SODIUM 10 MG: 10 TABLET, FILM COATED ORAL at 21:01

## 2020-08-30 RX ADMIN — SODIUM CHLORIDE 4 ML: 7 NEBU SOLN,3 % NEBU at 22:16

## 2020-08-31 ENCOUNTER — TELEPHONE (OUTPATIENT)
Dept: GENERAL RADIOLOGY | Facility: HOSPITAL | Age: 85
End: 2020-08-31

## 2020-08-31 VITALS
HEART RATE: 79 BPM | TEMPERATURE: 97.5 F | BODY MASS INDEX: 25.76 KG/M2 | HEIGHT: 62 IN | RESPIRATION RATE: 18 BRPM | DIASTOLIC BLOOD PRESSURE: 72 MMHG | WEIGHT: 139.99 LBS | SYSTOLIC BLOOD PRESSURE: 117 MMHG | OXYGEN SATURATION: 90 %

## 2020-08-31 PROBLEM — C91.10 CLL (CHRONIC LYMPHOCYTIC LEUKEMIA): Status: ACTIVE | Noted: 2020-08-31

## 2020-08-31 LAB
INR PPP: 3.02 (ref 0.9–1.1)
PROTHROMBIN TIME: 30.3 SECONDS (ref 11.7–14.2)

## 2020-08-31 PROCEDURE — 63710000001 PREDNISONE PER 1 MG: Performed by: INTERNAL MEDICINE

## 2020-08-31 PROCEDURE — 94799 UNLISTED PULMONARY SVC/PX: CPT

## 2020-08-31 PROCEDURE — 85610 PROTHROMBIN TIME: CPT | Performed by: INTERNAL MEDICINE

## 2020-08-31 RX ORDER — PREDNISONE 20 MG/1
20 TABLET ORAL
Qty: 2 TABLET | Refills: 0 | Status: SHIPPED | OUTPATIENT
Start: 2020-09-01 | End: 2020-09-02

## 2020-08-31 RX ORDER — WARFARIN SODIUM 4 MG/1
4 TABLET ORAL
Status: DISCONTINUED | OUTPATIENT
Start: 2020-09-04 | End: 2020-08-31 | Stop reason: HOSPADM

## 2020-08-31 RX ORDER — WARFARIN SODIUM 2 MG/1
2 TABLET ORAL
Status: DISCONTINUED | OUTPATIENT
Start: 2020-08-31 | End: 2020-08-31 | Stop reason: HOSPADM

## 2020-08-31 RX ADMIN — TIOTROPIUM BROMIDE INHALATION SPRAY 2 PUFF: 3.12 SPRAY, METERED RESPIRATORY (INHALATION) at 07:22

## 2020-08-31 RX ADMIN — FLUTICASONE PROPIONATE 1 SPRAY: 50 SPRAY, METERED NASAL at 08:08

## 2020-08-31 RX ADMIN — FUROSEMIDE 20 MG: 20 TABLET ORAL at 08:08

## 2020-08-31 RX ADMIN — ASPIRIN 81 MG: 81 TABLET, COATED ORAL at 08:08

## 2020-08-31 RX ADMIN — CETIRIZINE HYDROCHLORIDE 10 MG: 10 TABLET ORAL at 08:08

## 2020-08-31 RX ADMIN — PANTOPRAZOLE SODIUM 40 MG: 40 TABLET, DELAYED RELEASE ORAL at 06:32

## 2020-08-31 RX ADMIN — PREDNISONE 40 MG: 20 TABLET ORAL at 07:25

## 2020-08-31 RX ADMIN — CARVEDILOL 3.12 MG: 3.12 TABLET, FILM COATED ORAL at 08:08

## 2020-08-31 RX ADMIN — LOSARTAN POTASSIUM 25 MG: 25 TABLET, FILM COATED ORAL at 08:08

## 2020-08-31 RX ADMIN — LOPERAMIDE HYDROCHLORIDE 2 MG: 2 CAPSULE ORAL at 08:08

## 2020-08-31 RX ADMIN — GLIPIZIDE 2.5 MG: 5 TABLET ORAL at 07:25

## 2020-08-31 RX ADMIN — ALBUTEROL SULFATE 2.5 MG: 2.5 SOLUTION RESPIRATORY (INHALATION) at 07:20

## 2020-08-31 RX ADMIN — SODIUM CHLORIDE 4 ML: 7 NEBU SOLN,3 % NEBU at 07:21

## 2020-08-31 RX ADMIN — SODIUM CHLORIDE, PRESERVATIVE FREE 10 ML: 5 INJECTION INTRAVENOUS at 08:08

## 2020-08-31 NOTE — TELEPHONE ENCOUNTER
----- Message from Nickie Dean RN sent at 8/31/2020  8:58 AM EDT -----  According to the hospital note, pt has appt with Ashley on 10/21 and ivig should be given so that second infusion lines up with appt.  That would make her due to start on 9/23.    Call with questions    Thanks!    ----- Message -----  From: Fili Tyler MD  Sent: 8/30/2020  12:38 PM EDT  To: Lucien Larkin MD, Kristel Saunders, RN, #     BRING HER FOR MONTHLY IGG 30 G AND TANYA DR LARKIN SECOND INFUSION, MONTHLY IGG LEVEL CBC

## 2020-09-01 ENCOUNTER — READMISSION MANAGEMENT (OUTPATIENT)
Dept: CALL CENTER | Facility: HOSPITAL | Age: 85
End: 2020-09-01

## 2020-09-01 ENCOUNTER — TELEPHONE (OUTPATIENT)
Dept: ONCOLOGY | Facility: CLINIC | Age: 85
End: 2020-09-01

## 2020-09-01 ENCOUNTER — ANTICOAGULATION VISIT (OUTPATIENT)
Dept: PHARMACY | Facility: HOSPITAL | Age: 85
End: 2020-09-01

## 2020-09-01 DIAGNOSIS — I48.0 PAF (PAROXYSMAL ATRIAL FIBRILLATION) (HCC): ICD-10-CM

## 2020-09-01 NOTE — OUTREACH NOTE
Prep Survey      Responses   [facility] facility patient discharged from?  [facility]   Is LACE score < 7 ?  No   Eligibility  Readm Mgmt   Discharge diagnosis  Acute rhinovirus infection, asthma with COPD AE, acute hypoxic resp. failure, chronic combined CHF   COVID-19 Test Status  Negative   Does the patient have one of the following disease processes/diagnoses(primary or secondary)?  COPD/Pneumonia   Does the patient have Home health ordered?  No   Is there a DME ordered?  No   Comments regarding appointments  Pt to schedule F/U wit hPCP   Medication alerts for this patient  see AVS   General alerts for this patient  [facility]   Prep survey completed?  Yes          Pattie Fleming RN

## 2020-09-01 NOTE — PROGRESS NOTES
"Anticoagulation Clinic Progress Note    Anticoagulation Summary  As of 9/1/2020    INR goal:   2.0-3.0   TTR:   75.8 % (1.9 y)   INR used for dosing:   3.02! (8/31/2020)   Warfarin maintenance plan:   4 mg every Mon, Fri; 2 mg all other days   Weekly warfarin total:   18 mg   Plan last modified:   Alexander Valiente RPH (5/18/2020)   Next INR check:   9/16/2020   Priority:   Maintenance   Target end date:   Indefinite    Indications    PAF (paroxysmal atrial fibrillation) (CMS/[facility]) [I48.0]             Anticoagulation Episode Summary     INR check location:       Preferred lab:       Send INR reminders to:    [facility] CLINICAL POOL    Comments:   [facility] beginning 4/22/20      Anticoagulation Care Providers     Provider Role Specialty Phone number    Rommel Veliz MD Referring Cardiology [phone]          Clinic Interview:  Patient Findings     Positives:   Change in health, Extra doses, Change in medications,   Hospital admission, Other complaints    Negatives:   Signs/symptoms of thrombosis, Signs/symptoms of bleeding,   Laboratory test error suspected, Change in alcohol use, Change in   activity, Upcoming invasive procedure, Emergency department visit,   Upcoming dental procedure, Missed doses, Change in diet/appetite, Bruising      Comments:   Admitted over weekend. Prednisone 40 mg x 3 days including   today. Reports she is feeling very well today (\"better than I have in a   long time\") and is eating well. Pt reports the facility is requiring a   14-day quarantine since she was in the hospital.      Clinical Outcomes     Negatives:   Major bleeding event, Thromboembolic event,   Anticoagulation-related hospital admission, Anticoagulation-related ED   visit, Anticoagulation-related fatality    Comments:   Admitted over weekend. Prednisone 40 mg x 3 days including   today. Reports she is feeling very well today (\"better than I have in a   long time\") and is eating well. Pt reports the facility " is requiring a   14-day quarantine since she was in the hospital.        INR History:  Anticoagulation Monitoring 7/22/2020 8/19/2020 9/1/2020   INR 2.80 2.60 3.02   INR Date 7/22/2020 8/19/2020 8/31/2020   INR Goal 2.0-3.0 2.0-3.0 2.0-3.0   Trend Same Same Same   Last Week Total 18 mg 18 mg 17 mg   Next Week Total 18 mg 18 mg 17 mg   Sun 2 mg 2 mg 2 mg   Mon 4 mg 4 mg 4 mg   Tue 2 mg 2 mg 1 mg (9/1); Otherwise 2 mg   Wed 2 mg 2 mg 2 mg   Thu 2 mg 2 mg 2 mg   Fri 4 mg 4 mg 4 mg   Sat 2 mg 2 mg 2 mg   Visit Report - - -   Some recent data might be hidden       Plan:  1. INR is Supratherapeutic today- see above in Anticoagulation Summary.   Will instruct Caitlyn Longoria to Change their warfarin regimen- see above in Anticoagulation Summary.  2. Follow up in 2 weeks as previously scheduled, as pt reports facility is requiring 14-day quarantine; she agreed to contact clinic if any changes occur, including illness, diarrhea, med changes, diet changes, etc, and a sooner INR check will be arranged.   3. They have been instructed to call if any changes in medications, doses, concerns, etc. Patient expresses understanding and has no further questions at this time.    Alexander Valiente [facility]

## 2020-09-01 NOTE — TELEPHONE ENCOUNTER
The patient was just discharged from the hospital and saw Dr. Tyler while there.  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  She wanted more information regarding the IVIG including time for the infusion and possible side effects.  I did review this information with her and will have some formal education materials mailed to the patient.  She is scheduled to come on September 24 for the IVIG infusion.  I have asked her to call with any issues or concerns prior to this time.  She has verbalized understanding

## 2020-09-02 ENCOUNTER — READMISSION MANAGEMENT (OUTPATIENT)
Dept: CALL CENTER | Facility: HOSPITAL | Age: 85
End: 2020-09-02

## 2020-09-02 NOTE — OUTREACH NOTE
COPD/PN Week 1 Survey      Responses   [facility]   COVID-19 Test Status  Negative   Does the patient have one of the following disease processes/diagnoses(primary or secondary)?  COPD/Pneumonia   Is there a successful TCM telephone encounter documented?  No   Was the primary reason for admission:  COPD exacerbation   Week 1 attempt successful?  Yes   Call start time  1507   Call end time  1514   General alerts for this patient  [facility]   Discharge diagnosis  Acute rhinovirus infection, asthma with COPD AE, acute hypoxic resp. failure, chronic combined CHF   Medication alerts for this patient  see AVS   Meds reviewed with patient/caregiver?  Yes   Is the patient having any side effects they believe may be caused by any medication additions or changes?  Yes   Does the patient have all medications ordered at discharge?  Yes   Is the patient taking all medications as directed (includes completed medication regime)?  Yes   Comments regarding appointments  Pt to schedule F/U wit hPCP   Does the patient have a primary care provider?   Yes   Comments regarding PCP  She has an appt made.    Has the patient kept scheduled appointments due by today?  Yes   Pulse Ox monitoring  Intermittent   Pulse Ox device source  Patient   O2 Sat education comments  She gets winded with activity.    Psychosocial issues?  No   Did the patient receive a copy of their discharge instructions?  Yes   Nursing interventions  Reviewed instructions with patient, Educated on MyChart   What is the patient's perception of their health status since discharge?  Same   Nursing Interventions  Nurse provided patient education   Are the patient's immunizations up to date?   Yes   Nursing interventions  Educated on importance of maintaining up to date immunizations as advised by provider   Is the patient/caregiver able to teach back the hierarchy of who to call/visit for symptoms/problems? PCP, Specialist,  Home health nurse, Urgent Care, ED, 911  Yes   Is the patient able to teach back COPD zones?  Yes   Nursing interventions  Education provided on various zones   Patient reports what zone on this call?  Yellow Zone   Yellow Zone  Increased shortness of air, Increased or thicker phlegm/mucus, Using quick relief inhaler/nebulizer more often   Yellow interventions  Continue to use daily medications, Use quick relief inhaler as ordered, Get plenty of rest   Week 1 call completed?  Yes   Wrap up additional comments  She gets soa very quickly          Sonia Draper RN

## 2020-09-03 ENCOUNTER — TELEPHONE (OUTPATIENT)
Dept: ONCOLOGY | Facility: CLINIC | Age: 85
End: 2020-09-03

## 2020-09-03 LAB
BACTERIA SPEC AEROBE CULT: NORMAL
BACTERIA SPEC AEROBE CULT: NORMAL

## 2020-09-03 NOTE — TELEPHONE ENCOUNTER
S/W PT. SISTER[employer]IN[employer]LAW DICKSON.  SHE WILL PRINT SOME INFO OFF THE COMPUTER ON IVIG AND GIVE IT TO THE NURSES AT THE [facility].  PT. NOT DUE FOR IT UNTIL 9/23/20.

## 2020-09-04 ENCOUNTER — NURSE TRIAGE (OUTPATIENT)
Dept: CALL CENTER | Facility: HOSPITAL | Age: 85
End: 2020-09-04

## 2020-09-04 NOTE — TELEPHONE ENCOUNTER
"Caller states the nurse who discharged her put the inhaler in her bag and told her to take it until it was done. There is no mention of changing her inhaler in the discharge summary or on her AVS. Caller states she is going to go back to using her Albuteral 4 times a day like she did before she was admitted.     Reason for Disposition  • Caller has medication question, adult has minor symptoms, caller declines triage, AND triager answers question    Additional Information  • Negative: Drug overdose and triager unable to answer question  • Negative: Caller requesting information unrelated to medicine  • Negative: Caller requesting a prescription for Strep throat and has a positive culture result  • Negative: Rash while taking a medication or within 3 days of stopping it  • Negative: Immunization reaction suspected  • Negative: [1] Asthma and [2] having symptoms of asthma (cough, wheezing, etc.)  • Negative: [1] Influenza symptoms AND [2] anti-viral med prescription request, such as Tamiflu  • Negative: [1] Symptom of illness (e.g., headache, abdominal pain, earache, vomiting) AND [2] more than mild  • Negative: MORE THAN A DOUBLE DOSE of a prescription or over-the-counter (OTC) drug  • Negative: [1] DOUBLE DOSE (an extra dose or lesser amount) of over-the-counter (OTC) drug AND [2] any symptoms (e.g., dizziness, nausea, pain, sleepiness)  • Negative: [1] DOUBLE DOSE (an extra dose or lesser amount) of prescription drug AND [2] any symptoms (e.g., dizziness, nausea, pain, sleepiness)  • Negative: Took another person's prescription drug  • Negative: [1] DOUBLE DOSE (an extra dose or lesser amount) of prescription drug AND [2] NO symptoms (Exception: a double dose of antibiotics)  • Negative: Diabetes drug error or overdose (e.g., took wrong type of insulin or took extra dose)  • Negative: [1] Request for URGENT new prescription or refill of \"essential\" medication (i.e., likelihood of harm to patient if not taken) AND " "[2] triager unable to fill per unit policy  • Negative: [1] Prescription not at pharmacy AND [2] was prescribed by PCP recently  • Negative: [1] Pharmacy calling with prescription questions AND [2] triager unable to answer question  • Negative: [1] Caller has URGENT medication question about med that PCP or specialist prescribed AND [2] triager unable to answer question  • Negative: [1] Caller has NON-URGENT medication question about med that PCP prescribed AND [2] triager unable to answer question  • Negative: [1] Caller requesting a NON-URGENT new prescription or refill AND [2] triager unable to refill per unit policy  • Negative: [1] Caller has medication question about med not prescribed by PCP AND [2] triager unable to answer question (e.g., compatibility with other med, storage)  • Negative: Caller requesting a CONTROLLED substance prescription refill (e.g., narcotics, ADHD medicines)  • Negative: Caller wants to use a complementary or alternative medicine  • Negative: [1] Prescription prescribed recently is not at pharmacy AND [2] triager has access to patient's EMR AND [3] prescription is recorded in the EMR  • Negative: [1] DOUBLE DOSE (an extra dose or lesser amount) of over-the-counter (OTC) drug AND [2] NO symptoms  • Negative: [1] DOUBLE DOSE (an extra dose or lesser amount) of antibiotic drug AND [2] NO symptoms  • Negative: Caller has medication question only, adult not sick, and triager answers question    Answer Assessment - Initial Assessment Questions  1.   NAME of MEDICATION: \"What medicine are you calling about?\"      inhalers  2.   QUESTION: \"What is your question?\"      Can I go   back to taking my Albuterol inhalers like I had before I was in the hospital   3.   PRESCRIBING HCP: \"Who prescribed it?\" Reason: if prescribed by specialist, call should be referred to that group.      Allergy doctor  4. SYMPTOMS: \"Do you have any symptoms?\"      Shortness of breath  5. SEVERITY: If symptoms are " "present, ask \"Are they mild, moderate or severe?\"      moderate  6.  PREGNANCY:  \"Is there any chance that you are pregnant?\" \"When was your last menstrual period?\"      na    Protocols used: MEDICATION QUESTION CALL-ADULT-      "

## 2020-09-10 ENCOUNTER — READMISSION MANAGEMENT (OUTPATIENT)
Dept: CALL CENTER | Facility: HOSPITAL | Age: 85
End: 2020-09-10

## 2020-09-10 NOTE — OUTREACH NOTE
COPD/PN Week 2 Survey      Responses   [facility]   COVID-19 Test Status  Negative   Does the patient have one of the following disease processes/diagnoses(primary or secondary)?  COPD/Pneumonia   Was the primary reason for admission:  COPD exacerbation   Week 2 attempt successful?  Yes   Call start time  1418   Call end time  1420   General alerts for this patient  [facility]   Discharge diagnosis  Acute rhinovirus infection, asthma with COPD AE, acute hypoxic resp. failure, chronic combined CHF   Meds reviewed with patient/caregiver?  Yes   Is the patient having any side effects they believe may be caused by any medication additions or changes?  No   Does the patient have all medications ordered at discharge?  Yes   Is the patient taking all medications as directed (includes completed medication regime)?  Yes   Does the patient have a primary care provider?   Yes   Does the patient have an appointment with their PCP or pulmonologist within 7 days of discharge?  Yes   Has the patient kept scheduled appointments due by today?  Yes   Has home health visited the patient within 72 hours of discharge?  N/A   Pulse Ox monitoring  None   Psychosocial issues?  No   Did the patient receive a copy of their discharge instructions?  Yes   Nursing interventions  Reviewed instructions with patient   What is the patient's perception of their health status since discharge?  Improving   Nursing Interventions  Nurse provided patient education   Are the patient's immunizations up to date?   Yes   Nursing interventions  Educated on importance of maintaining up to date immunizations as advised by provider   Is the patient/caregiver able to teach back the hierarchy of who to call/visit for symptoms/problems? PCP, Specialist, Home health nurse, Urgent Care, ED, 911  Yes   Is the patient able to teach back COPD zones?  Yes   Nursing interventions  Education provided on various zones    Patient reports what zone on this call?  Yellow Zone   Yellow Zone  Increased shortness of air, Increased or thicker phlegm/mucus, Using quick relief inhaler/nebulizer more often   Yellow interventions  Continue to use daily medications, Use quick relief inhaler as ordered, Get plenty of rest   Week 2 call completed?  Yes          Saul Harvey, RN

## 2020-09-11 ENCOUNTER — TELEPHONE (OUTPATIENT)
Dept: ONCOLOGY | Facility: CLINIC | Age: 85
End: 2020-09-11

## 2020-09-11 NOTE — TELEPHONE ENCOUNTER
Pt called her pulmonologist has started her on Prednisone he told to call and  her appts for 9/23 out 1 week  Best call back number [phone]

## 2020-09-16 ENCOUNTER — ANTICOAGULATION VISIT (OUTPATIENT)
Dept: PHARMACY | Facility: HOSPITAL | Age: 85
End: 2020-09-16

## 2020-09-16 DIAGNOSIS — I48.0 PAF (PAROXYSMAL ATRIAL FIBRILLATION) (HCC): ICD-10-CM

## 2020-09-16 LAB — INR PPP: 3.2

## 2020-09-16 NOTE — PROGRESS NOTES
Anticoagulation Clinic Progress Note    Anticoagulation Summary  As of 9/16/2020    INR goal:  2.0-3.0   TTR:  75.8 % (1.9 y)   INR used for dosing:  3.20 (9/16/2020)   Warfarin maintenance plan:  4 mg every Mon, Fri; 2 mg all other days   Weekly warfarin total:  18 mg   Plan last modified:  Alexander Valiente RPH (5/18/2020)   Next INR check:  9/21/2020   Priority:  Maintenance   Target end date:  Indefinite    Indications    PAF (paroxysmal atrial fibrillation) (CMS/[facility]) [I48.0]             Anticoagulation Episode Summary     INR check location:      Preferred lab:      Send INR reminders to:   [facility]    Comments:  [facility] beginning 4/22/20      Anticoagulation Care Providers     Provider Role Specialty Phone number    Rommel Veliz MD Referring Cardiology [phone]          Clinic Interview:  Patient Findings     Positives:  Change in medications    Negatives:  Signs/symptoms of thrombosis, Signs/symptoms of bleeding,   Laboratory test error suspected, Change in health, Change in alcohol use,   Change in activity, Upcoming invasive procedure, Emergency department   visit, Upcoming dental procedure, Missed doses, Extra doses, Change in   diet/appetite, Hospital admission, Bruising, Other complaints    Comments:  Started prednisone taper on 9/12 (60 mg daily x 3 days, then   decrease by 20 mg every 3 days).      Clinical Outcomes     Negatives:  Major bleeding event, Thromboembolic event,   Anticoagulation-related hospital admission, Anticoagulation-related ED   visit, Anticoagulation-related fatality    Comments:  Started prednisone taper on 9/12 (60 mg daily x 3 days, then   decrease by 20 mg every 3 days).        INR History:  Anticoagulation Monitoring 8/19/2020 9/1/2020 9/16/2020   INR 2.60 3.02 3.20   INR Date 8/19/2020 8/31/2020 9/16/2020   INR Goal 2.0-3.0 2.0-3.0 2.0-3.0   Trend Same Same Same   Last Week Total 18 mg 17 mg 18 mg   Next Week Total 18 mg 17 mg 14 mg   Sun 2  mg 2 mg 2 mg   Mon 4 mg 4 mg -   Tue 2 mg 1 mg (9/1); Otherwise 2 mg -   Wed 2 mg 2 mg Hold (9/16)   Thu 2 mg 2 mg 2 mg   Fri 4 mg 4 mg 2 mg (9/18)   Sat 2 mg 2 mg 2 mg   Visit Report - - -   Some recent data might be hidden       Plan:  1. INR is Supratherapeutic today- see above in Anticoagulation Summary.   Will instruct Caitlyn Longoria to Change their warfarin regimen- see above in Anticoagulation Summary.  2. Follow up in 1 week  3. Pt has agreed to only be called if INR out of range. They have been instructed to call if any changes in medications, doses, concerns, etc. Patient expresses understanding and has no further questions at this time.    Alexander Valiente [facility]

## 2020-09-18 ENCOUNTER — READMISSION MANAGEMENT (OUTPATIENT)
Dept: CALL CENTER | Facility: HOSPITAL | Age: 85
End: 2020-09-18

## 2020-09-18 NOTE — OUTREACH NOTE
COPD/PN Week 3 Survey      Responses   [facility] [place]   COVID-19 Test Status  Negative   Does the patient have one of the following disease processes/diagnoses(primary or secondary)?  COPD/Pneumonia   Was the primary reason for admission:  COPD exacerbation   Week 3 attempt successful?  Yes   Call start time  1433   Call end time  1438   General alerts for this patient  [facility]   Discharge diagnosis  Acute rhinovirus infection, asthma with COPD AE, acute hypoxic resp. failure, chronic combined CHF   Meds reviewed with patient/caregiver?  Yes   Is the patient having any side effects they believe may be caused by any medication additions or changes?  No   Does the patient have all medications ordered at discharge?  Yes   Is the patient taking all medications as directed (includes completed medication regime)?  Yes   Does the patient have a primary care provider?   Yes   Does the patient have an appointment with their PCP or pulmonologist within 7 days of discharge?  Yes   Has the patient kept scheduled appointments due by today?  Yes   Has home health visited the patient within 72 hours of discharge?  N/A   Pulse Ox monitoring  None   Psychosocial issues?  No   Did the patient receive a copy of their discharge instructions?  Yes   Nursing interventions  Reviewed instructions with patient   What is the patient's perception of their health status since discharge?  Improving   Nursing Interventions  Nurse provided patient education   Are the patient's immunizations up to date?   Yes   Nursing interventions  Educated on importance of maintaining up to date immunizations as advised by provider   Is the patient/caregiver able to teach back the hierarchy of who to call/visit for symptoms/problems? PCP, Specialist, Home health nurse, Urgent Care, ED, 911  Yes   Additional teach back comments  New nebulizer this week, she wheezes a great deal, infusion for immune system later this  month.   Is the patient able to teach back COPD zones?  Yes   Nursing interventions  Education provided on various zones   Patient reports what zone on this call?  Green Zone   Green Zone  Reports doing well, Breathing without shortness of breath, Sleeping well, Appetite is good   Green Zone interventions:  Take daily medications, Use oxygen as prescribed   Week 3 call completed?  Yes   Wrap up additional comments  1/4 mile from room to dining room and rests one time on go and back.          Tracy Ruff RN

## 2020-09-23 ENCOUNTER — APPOINTMENT (OUTPATIENT)
Dept: LAB | Facility: HOSPITAL | Age: 85
End: 2020-09-23

## 2020-09-23 ENCOUNTER — ANTICOAGULATION VISIT (OUTPATIENT)
Dept: PHARMACY | Facility: HOSPITAL | Age: 85
End: 2020-09-23

## 2020-09-23 ENCOUNTER — APPOINTMENT (OUTPATIENT)
Dept: ONCOLOGY | Facility: HOSPITAL | Age: 85
End: 2020-09-23

## 2020-09-23 DIAGNOSIS — I48.0 PAF (PAROXYSMAL ATRIAL FIBRILLATION) (HCC): ICD-10-CM

## 2020-09-23 LAB — INR PPP: 2.4

## 2020-09-23 NOTE — PROGRESS NOTES
Anticoagulation Clinic Progress Note    Anticoagulation Summary  As of 2020    INR goal:  2.0-3.0   TTR:  75.8 % (1.9 y)   INR used for dosin.40 (2020)   Warfarin maintenance plan:  4 mg every Mon, Fri; 2 mg all other days   Weekly warfarin total:  18 mg   No change documented:  Pao Levi RPH   Plan last modified:  Alexander Valiente RPH (2020)   Next INR check:  10/7/2020   Priority:  Maintenance   Target end date:  Indefinite    Indications    PAF (paroxysmal atrial fibrillation) (CMS/HCC) [I48.0]             Anticoagulation Episode Summary     INR check location:      Preferred lab:      Send INR reminders to:  [facility] [place]    Comments:  [facility] beginning 20      Anticoagulation Care Providers     Provider Role Specialty Phone number    Rommel Veliz MD Referring Cardiology [phone]          Clinic Interview:      INR History:  Anticoagulation Monitoring 2020   INR 3.02 3.20 2.40   INR Date 2020   INR Goal 2.0-3.0 2.0-3.0 2.0-3.0   Trend Same Same Same   Last Week Total 17 mg 18 mg 14 mg   Next Week Total 17 mg 14 mg 18 mg   Sun 2 mg 2 mg 2 mg   Mon 4 mg - 4 mg   Tue 1 mg (); Otherwise 2 mg - 2 mg   Wed 2 mg Hold () 2 mg   Thu 2 mg 2 mg 2 mg   Fri 4 mg 2 mg () 4 mg   Sat 2 mg 2 mg 2 mg   Visit Report - - -   Some recent data might be hidden       Plan:  1. INR is Therapeutic today- see above in Anticoagulation Summary.   Will instruct Caitlyn GARRETT Prietosabina to Continue their warfarin regimen- see above in Anticoagulation Summary.  2. Follow up in 2 week  3. Spoke with pt today.  They have been instructed to call if any changes in medications, doses, concerns, etc. Patient expresses understanding and has no further questions at this time.    Pao Levi RPH

## 2020-09-28 ENCOUNTER — TELEPHONE (OUTPATIENT)
Dept: ONCOLOGY | Facility: CLINIC | Age: 85
End: 2020-09-28

## 2020-09-29 ENCOUNTER — TELEPHONE (OUTPATIENT)
Dept: ONCOLOGY | Facility: CLINIC | Age: 85
End: 2020-09-29

## 2020-09-29 ENCOUNTER — APPOINTMENT (OUTPATIENT)
Dept: CT IMAGING | Facility: HOSPITAL | Age: 85
End: 2020-09-29

## 2020-09-29 ENCOUNTER — HOSPITAL ENCOUNTER (INPATIENT)
Facility: HOSPITAL | Age: 85
LOS: 15 days | Discharge: NURSING, REHAB OR LONG-TERM CARE | End: 2020-10-14
Attending: EMERGENCY MEDICINE | Admitting: HOSPITALIST

## 2020-09-29 ENCOUNTER — APPOINTMENT (OUTPATIENT)
Dept: GENERAL RADIOLOGY | Facility: HOSPITAL | Age: 85
End: 2020-09-29

## 2020-09-29 ENCOUNTER — READMISSION MANAGEMENT (OUTPATIENT)
Dept: CALL CENTER | Facility: HOSPITAL | Age: 85
End: 2020-09-29

## 2020-09-29 DIAGNOSIS — R06.00 DYSPNEA, UNSPECIFIED TYPE: ICD-10-CM

## 2020-09-29 DIAGNOSIS — J15.6 PNEUMONIA DUE TO GRAM-NEGATIVE BACTERIA (HCC): ICD-10-CM

## 2020-09-29 DIAGNOSIS — S22.050A COMPRESSION FRACTURE OF T5 VERTEBRA, INITIAL ENCOUNTER (HCC): ICD-10-CM

## 2020-09-29 DIAGNOSIS — I48.0 PAROXYSMAL ATRIAL FIBRILLATION (HCC): ICD-10-CM

## 2020-09-29 DIAGNOSIS — J18.9 PNEUMONIA DUE TO INFECTIOUS ORGANISM, UNSPECIFIED LATERALITY, UNSPECIFIED PART OF LUNG: Primary | ICD-10-CM

## 2020-09-29 DIAGNOSIS — D72.829 LEUKOCYTOSIS, UNSPECIFIED TYPE: ICD-10-CM

## 2020-09-29 PROBLEM — C91.12 CLL (CHRONIC LYMPHOID LEUKEMIA) IN RELAPSE (HCC): Status: ACTIVE | Noted: 2020-08-31

## 2020-09-29 LAB
ALBUMIN SERPL-MCNC: 3.9 G/DL (ref 3.5–5.2)
ALBUMIN/GLOB SERPL: 1.6 G/DL
ALP SERPL-CCNC: 105 U/L (ref 39–117)
ALT SERPL W P-5'-P-CCNC: 23 U/L (ref 1–33)
ANION GAP SERPL CALCULATED.3IONS-SCNC: 13.1 MMOL/L (ref 5–15)
AST SERPL-CCNC: 17 U/L (ref 1–32)
ATMOSPHERIC PRESS: 744.9 MMHG
BASE EXCESS BLDV CALC-SCNC: -1.1 MMOL/L
BASOPHILS # BLD MANUAL: 0.24 10*3/MM3 (ref 0–0.2)
BASOPHILS NFR BLD AUTO: 1 % (ref 0–1.5)
BDY SITE: ABNORMAL
BILIRUB SERPL-MCNC: 0.8 MG/DL (ref 0–1.2)
BUN SERPL-MCNC: 16 MG/DL (ref 8–23)
BUN/CREAT SERPL: 16 (ref 7–25)
CALCIUM SPEC-SCNC: 9.1 MG/DL (ref 8.6–10.5)
CHLORIDE SERPL-SCNC: 105 MMOL/L (ref 98–107)
CO2 SERPL-SCNC: 22.9 MMOL/L (ref 22–29)
CREAT SERPL-MCNC: 1 MG/DL (ref 0.57–1)
D DIMER PPP FEU-MCNC: 0.66 MCGFEU/ML (ref 0–0.49)
D-LACTATE SERPL-SCNC: 1 MMOL/L (ref 0.5–2)
DEPRECATED RDW RBC AUTO: 42.6 FL (ref 37–54)
ERYTHROCYTE [DISTWIDTH] IN BLOOD BY AUTOMATED COUNT: 12.7 % (ref 12.3–15.4)
GFR SERPL CREATININE-BSD FRML MDRD: 53 ML/MIN/1.73
GLOBULIN UR ELPH-MCNC: 2.5 GM/DL
GLUCOSE SERPL-MCNC: 131 MG/DL (ref 65–99)
HCO3 BLDV-SCNC: 22.7 MMOL/L (ref 22–28)
HCT VFR BLD AUTO: 39.2 % (ref 34–46.6)
HGB BLD-MCNC: 12.7 G/DL (ref 12–15.9)
INR PPP: 3.41 (ref 0.9–1.1)
LYMPHOCYTES # BLD MANUAL: 17.11 10*3/MM3 (ref 0.7–3.1)
LYMPHOCYTES NFR BLD MANUAL: 1 % (ref 5–12)
LYMPHOCYTES NFR BLD MANUAL: 70 % (ref 19.6–45.3)
MCH RBC QN AUTO: 30.1 PG (ref 26.6–33)
MCHC RBC AUTO-ENTMCNC: 32.4 G/DL (ref 31.5–35.7)
MCV RBC AUTO: 92.9 FL (ref 79–97)
MODALITY: ABNORMAL
MONOCYTES # BLD AUTO: 0.24 10*3/MM3 (ref 0.1–0.9)
NEUTROPHILS # BLD AUTO: 6.84 10*3/MM3 (ref 1.7–7)
NEUTROPHILS NFR BLD MANUAL: 28 % (ref 42.7–76)
NT-PROBNP SERPL-MCNC: 535.5 PG/ML (ref 0–1800)
PCO2 BLDV: 34.2 MM HG (ref 41–51)
PH BLDV: 7.43 PH UNITS (ref 7.31–7.41)
PLAT MORPH BLD: NORMAL
PLATELET # BLD AUTO: 158 10*3/MM3 (ref 140–450)
PMV BLD AUTO: 10 FL (ref 6–12)
PO2 BLDV: 40.6 MM HG (ref 35–45)
POTASSIUM SERPL-SCNC: 3.2 MMOL/L (ref 3.5–5.2)
PROT SERPL-MCNC: 6.4 G/DL (ref 6–8.5)
PROTHROMBIN TIME: 33.2 SECONDS (ref 11.7–14.2)
RBC # BLD AUTO: 4.22 10*6/MM3 (ref 3.77–5.28)
RBC MORPH BLD: NORMAL
SAO2 % BLDCOA: 77.8 % (ref 92–99)
SMUDGE CELLS BLD QL SMEAR: ABNORMAL
SODIUM SERPL-SCNC: 141 MMOL/L (ref 136–145)
TOTAL RATE: 26 BREATHS/MINUTE
TROPONIN T SERPL-MCNC: <0.01 NG/ML (ref 0–0.03)
TROPONIN T SERPL-MCNC: <0.01 NG/ML (ref 0–0.03)
WBC # BLD AUTO: 24.44 10*3/MM3 (ref 3.4–10.8)

## 2020-09-29 PROCEDURE — 94640 AIRWAY INHALATION TREATMENT: CPT

## 2020-09-29 PROCEDURE — 71046 X-RAY EXAM CHEST 2 VIEWS: CPT

## 2020-09-29 PROCEDURE — 87040 BLOOD CULTURE FOR BACTERIA: CPT | Performed by: EMERGENCY MEDICINE

## 2020-09-29 PROCEDURE — U0004 COV-19 TEST NON-CDC HGH THRU: HCPCS | Performed by: EMERGENCY MEDICINE

## 2020-09-29 PROCEDURE — 25010000002 CEFTRIAXONE PER 250 MG: Performed by: EMERGENCY MEDICINE

## 2020-09-29 PROCEDURE — 93010 ELECTROCARDIOGRAM REPORT: CPT | Performed by: INTERNAL MEDICINE

## 2020-09-29 PROCEDURE — 80053 COMPREHEN METABOLIC PANEL: CPT | Performed by: EMERGENCY MEDICINE

## 2020-09-29 PROCEDURE — 85610 PROTHROMBIN TIME: CPT | Performed by: EMERGENCY MEDICINE

## 2020-09-29 PROCEDURE — 85007 BL SMEAR W/DIFF WBC COUNT: CPT | Performed by: EMERGENCY MEDICINE

## 2020-09-29 PROCEDURE — 94799 UNLISTED PULMONARY SVC/PX: CPT

## 2020-09-29 PROCEDURE — 84145 PROCALCITONIN (PCT): CPT | Performed by: HOSPITALIST

## 2020-09-29 PROCEDURE — 71275 CT ANGIOGRAPHY CHEST: CPT

## 2020-09-29 PROCEDURE — 84484 ASSAY OF TROPONIN QUANT: CPT | Performed by: EMERGENCY MEDICINE

## 2020-09-29 PROCEDURE — 83605 ASSAY OF LACTIC ACID: CPT | Performed by: EMERGENCY MEDICINE

## 2020-09-29 PROCEDURE — 83880 ASSAY OF NATRIURETIC PEPTIDE: CPT | Performed by: EMERGENCY MEDICINE

## 2020-09-29 PROCEDURE — 99285 EMERGENCY DEPT VISIT HI MDM: CPT

## 2020-09-29 PROCEDURE — 93005 ELECTROCARDIOGRAM TRACING: CPT | Performed by: EMERGENCY MEDICINE

## 2020-09-29 PROCEDURE — 82803 BLOOD GASES ANY COMBINATION: CPT

## 2020-09-29 PROCEDURE — 0 IOPAMIDOL PER 1 ML: Performed by: EMERGENCY MEDICINE

## 2020-09-29 PROCEDURE — 85025 COMPLETE CBC W/AUTO DIFF WBC: CPT | Performed by: EMERGENCY MEDICINE

## 2020-09-29 PROCEDURE — 85379 FIBRIN DEGRADATION QUANT: CPT | Performed by: EMERGENCY MEDICINE

## 2020-09-29 RX ORDER — POTASSIUM CHLORIDE 1.5 G/1.77G
40 POWDER, FOR SOLUTION ORAL ONCE
Status: COMPLETED | OUTPATIENT
Start: 2020-09-30 | End: 2020-09-30

## 2020-09-29 RX ORDER — SODIUM CHLORIDE 0.9 % (FLUSH) 0.9 %
10 SYRINGE (ML) INJECTION AS NEEDED
Status: DISCONTINUED | OUTPATIENT
Start: 2020-09-29 | End: 2020-10-14 | Stop reason: HOSPADM

## 2020-09-29 RX ORDER — IPRATROPIUM BROMIDE AND ALBUTEROL SULFATE 2.5; .5 MG/3ML; MG/3ML
3 SOLUTION RESPIRATORY (INHALATION) ONCE
Status: COMPLETED | OUTPATIENT
Start: 2020-09-29 | End: 2020-09-29

## 2020-09-29 RX ORDER — POTASSIUM CHLORIDE 750 MG/1
20 CAPSULE, EXTENDED RELEASE ORAL ONCE
Status: COMPLETED | OUTPATIENT
Start: 2020-09-29 | End: 2020-09-29

## 2020-09-29 RX ORDER — FUROSEMIDE 10 MG/ML
20 INJECTION INTRAMUSCULAR; INTRAVENOUS ONCE
Status: DISCONTINUED | OUTPATIENT
Start: 2020-09-29 | End: 2020-09-29

## 2020-09-29 RX ORDER — CEFTRIAXONE SODIUM 1 G/50ML
1 INJECTION, SOLUTION INTRAVENOUS ONCE
Status: COMPLETED | OUTPATIENT
Start: 2020-09-29 | End: 2020-09-29

## 2020-09-29 RX ADMIN — DOXYCYCLINE 100 MG: 100 INJECTION, POWDER, LYOPHILIZED, FOR SOLUTION INTRAVENOUS at 20:42

## 2020-09-29 RX ADMIN — CEFTRIAXONE SODIUM 1 G: 1 INJECTION, SOLUTION INTRAVENOUS at 20:05

## 2020-09-29 RX ADMIN — IPRATROPIUM BROMIDE AND ALBUTEROL SULFATE 3 ML: 2.5; .5 SOLUTION RESPIRATORY (INHALATION) at 22:12

## 2020-09-29 RX ADMIN — IPRATROPIUM BROMIDE AND ALBUTEROL SULFATE 3 ML: 2.5; .5 SOLUTION RESPIRATORY (INHALATION) at 18:06

## 2020-09-29 RX ADMIN — POTASSIUM CHLORIDE 20 MEQ: 10 CAPSULE, COATED, EXTENDED RELEASE ORAL at 22:31

## 2020-09-29 RX ADMIN — IOPAMIDOL 95 ML: 755 INJECTION, SOLUTION INTRAVENOUS at 21:22

## 2020-09-29 NOTE — TELEPHONE ENCOUNTER
PT CALLING ABOUT INFUSION. ASKING IF WE PROVIDE LUNCH. TRIED TO CALL PT BACK BUT NO ANSWER AND NO VM TO L/M. OBVIOUSLY PT HAS TO BRING HER OWN LUNCH.

## 2020-09-29 NOTE — TELEPHONE ENCOUNTER
Patient has first infusion tomorrow and she has a question.  What does she do about lunch during this infusion?  Is anything provided or should/could she bring something?  She states she has pre-diabetes.    [phone]

## 2020-09-29 NOTE — OUTREACH NOTE
COPD/PN Week 4 Survey      Responses   [facility]   COVID-19 Test Status  Negative   Does the patient have one of the following disease processes/diagnoses(primary or secondary)?  COPD/Pneumonia   Was the primary reason for admission:  COPD exacerbation   Week 4 attempt successful?  Yes   Call start time  1245   Call end time  1302   Discharge diagnosis  Acute rhinovirus infection, asthma with COPD AE, acute hypoxic resp. failure, chronic combined CHF   Meds reviewed with patient/caregiver?  Yes   Is the patient having any side effects they believe may be caused by any medication additions or changes?  No   Is the patient taking all medications as directed (includes completed medication regime)?  Yes   Has the patient kept scheduled appointments due by today?  Yes   Is the patient still receiving Home Health Services?  Yes   Pulse Ox monitoring  None   Psychosocial issues?  No   Comments  lives in assisted living facility, has some access to nurse care if needed   What is the patient's perception of their health status since discharge?  Same   Nursing Interventions  Nurse provided patient education   Additional teach back comments  pt states having increasing shortness of breath, and pain in center back, plans to move pul appt sooner for evaluation, states having some dryness in nostrils with some bleeding, advised pt to use saline gel that pt mentioned, and decrease use of nose gtts for a day, to see if condition improves as well as contacting pulmonollogist for more assistance, pt agreed with plan   Is the patient able to teach back COPD zones?  Yes   Nursing interventions  Education provided on various zones   Patient reports what zone on this call?  Yellow Zone   Yellow Zone  Increased shortness of air   Yellow interventions  Continue to use daily medications, Use oxygen as ordered, Use other meds such as steroids or antibiotics as ordered, Get plenty of rest, Call  provider immediatly if symptoms do not improve   Week 4 call completed?  Yes   Would the patient like one additional call?  No   Graduated  Yes   Did the patient feel the follow up calls were helpful during their recovery period?  Yes   Was the number of calls appropriate?  Yes          Krissy Hernandez RN

## 2020-09-30 ENCOUNTER — APPOINTMENT (OUTPATIENT)
Dept: GENERAL RADIOLOGY | Facility: HOSPITAL | Age: 85
End: 2020-09-30

## 2020-09-30 ENCOUNTER — TELEPHONE (OUTPATIENT)
Dept: ONCOLOGY | Facility: CLINIC | Age: 85
End: 2020-09-30

## 2020-09-30 ENCOUNTER — APPOINTMENT (OUTPATIENT)
Dept: ONCOLOGY | Facility: HOSPITAL | Age: 85
End: 2020-09-30

## 2020-09-30 ENCOUNTER — APPOINTMENT (OUTPATIENT)
Dept: LAB | Facility: HOSPITAL | Age: 85
End: 2020-09-30

## 2020-09-30 ENCOUNTER — APPOINTMENT (OUTPATIENT)
Dept: CARDIOLOGY | Facility: HOSPITAL | Age: 85
End: 2020-09-30

## 2020-09-30 PROBLEM — S22.050A CLOSED WEDGE COMPRESSION FRACTURE OF T5 VERTEBRA: Status: ACTIVE | Noted: 2020-09-30

## 2020-09-30 PROBLEM — J42 CHRONIC BRONCHITIS: Status: ACTIVE | Noted: 2017-12-07

## 2020-09-30 LAB
ANION GAP SERPL CALCULATED.3IONS-SCNC: 12.1 MMOL/L (ref 5–15)
BH CV LOWER VASCULAR LEFT COMMON FEMORAL AUGMENT: NORMAL
BH CV LOWER VASCULAR LEFT COMMON FEMORAL COMPETENT: NORMAL
BH CV LOWER VASCULAR LEFT COMMON FEMORAL COMPRESS: NORMAL
BH CV LOWER VASCULAR LEFT COMMON FEMORAL PHASIC: NORMAL
BH CV LOWER VASCULAR LEFT COMMON FEMORAL SPONT: NORMAL
BH CV LOWER VASCULAR LEFT DISTAL FEMORAL COMPRESS: NORMAL
BH CV LOWER VASCULAR LEFT GASTRONEMIUS COMPRESS: NORMAL
BH CV LOWER VASCULAR LEFT GREATER SAPH AK COMPRESS: NORMAL
BH CV LOWER VASCULAR LEFT GREATER SAPH BK COMPRESS: NORMAL
BH CV LOWER VASCULAR LEFT LESSER SAPH COMPRESS: NORMAL
BH CV LOWER VASCULAR LEFT MID FEMORAL AUGMENT: NORMAL
BH CV LOWER VASCULAR LEFT MID FEMORAL COMPETENT: NORMAL
BH CV LOWER VASCULAR LEFT MID FEMORAL COMPRESS: NORMAL
BH CV LOWER VASCULAR LEFT MID FEMORAL PHASIC: NORMAL
BH CV LOWER VASCULAR LEFT MID FEMORAL SPONT: NORMAL
BH CV LOWER VASCULAR LEFT PERONEAL COMPRESS: NORMAL
BH CV LOWER VASCULAR LEFT POPLITEAL AUGMENT: NORMAL
BH CV LOWER VASCULAR LEFT POPLITEAL COMPETENT: NORMAL
BH CV LOWER VASCULAR LEFT POPLITEAL COMPRESS: NORMAL
BH CV LOWER VASCULAR LEFT POPLITEAL PHASIC: NORMAL
BH CV LOWER VASCULAR LEFT POPLITEAL SPONT: NORMAL
BH CV LOWER VASCULAR LEFT POSTERIOR TIBIAL COMPRESS: NORMAL
BH CV LOWER VASCULAR LEFT PROFUNDA FEMORAL COMPRESS: NORMAL
BH CV LOWER VASCULAR LEFT PROXIMAL FEMORAL COMPRESS: NORMAL
BH CV LOWER VASCULAR LEFT SAPHENOFEMORAL JUNCTION COMPRESS: NORMAL
BH CV LOWER VASCULAR RIGHT COMMON FEMORAL AUGMENT: NORMAL
BH CV LOWER VASCULAR RIGHT COMMON FEMORAL COMPETENT: NORMAL
BH CV LOWER VASCULAR RIGHT COMMON FEMORAL COMPRESS: NORMAL
BH CV LOWER VASCULAR RIGHT COMMON FEMORAL PHASIC: NORMAL
BH CV LOWER VASCULAR RIGHT COMMON FEMORAL SPONT: NORMAL
BH CV LOWER VASCULAR RIGHT DISTAL FEMORAL COMPRESS: NORMAL
BH CV LOWER VASCULAR RIGHT GASTRONEMIUS COMPRESS: NORMAL
BH CV LOWER VASCULAR RIGHT GREATER SAPH AK COMPRESS: NORMAL
BH CV LOWER VASCULAR RIGHT GREATER SAPH BK COMPRESS: NORMAL
BH CV LOWER VASCULAR RIGHT LESSER SAPH COMPRESS: NORMAL
BH CV LOWER VASCULAR RIGHT MID FEMORAL AUGMENT: NORMAL
BH CV LOWER VASCULAR RIGHT MID FEMORAL COMPETENT: NORMAL
BH CV LOWER VASCULAR RIGHT MID FEMORAL COMPRESS: NORMAL
BH CV LOWER VASCULAR RIGHT MID FEMORAL PHASIC: NORMAL
BH CV LOWER VASCULAR RIGHT MID FEMORAL SPONT: NORMAL
BH CV LOWER VASCULAR RIGHT PERONEAL COMPRESS: NORMAL
BH CV LOWER VASCULAR RIGHT POPLITEAL AUGMENT: NORMAL
BH CV LOWER VASCULAR RIGHT POPLITEAL COMPETENT: NORMAL
BH CV LOWER VASCULAR RIGHT POPLITEAL COMPRESS: NORMAL
BH CV LOWER VASCULAR RIGHT POPLITEAL PHASIC: NORMAL
BH CV LOWER VASCULAR RIGHT POPLITEAL SPONT: NORMAL
BH CV LOWER VASCULAR RIGHT POSTERIOR TIBIAL COMPRESS: NORMAL
BH CV LOWER VASCULAR RIGHT PROFUNDA FEMORAL COMPRESS: NORMAL
BH CV LOWER VASCULAR RIGHT PROXIMAL FEMORAL COMPRESS: NORMAL
BH CV LOWER VASCULAR RIGHT SAPHENOFEMORAL JUNCTION COMPRESS: NORMAL
BUN SERPL-MCNC: 16 MG/DL (ref 8–23)
BUN/CREAT SERPL: 19 (ref 7–25)
CALCIUM SPEC-SCNC: 8.3 MG/DL (ref 8.6–10.5)
CHLORIDE SERPL-SCNC: 108 MMOL/L (ref 98–107)
CO2 SERPL-SCNC: 21.9 MMOL/L (ref 22–29)
CREAT SERPL-MCNC: 0.84 MG/DL (ref 0.57–1)
GFR SERPL CREATININE-BSD FRML MDRD: 64 ML/MIN/1.73
GLUCOSE BLDC GLUCOMTR-MCNC: 118 MG/DL (ref 70–130)
GLUCOSE BLDC GLUCOMTR-MCNC: 358 MG/DL (ref 70–130)
GLUCOSE BLDC GLUCOMTR-MCNC: 366 MG/DL (ref 70–130)
GLUCOSE SERPL-MCNC: 235 MG/DL (ref 65–99)
INR PPP: 3.78 (ref 0.9–1.1)
POTASSIUM SERPL-SCNC: 4.1 MMOL/L (ref 3.5–5.2)
PROCALCITONIN SERPL-MCNC: 0.05 NG/ML (ref 0–0.25)
PROCALCITONIN SERPL-MCNC: 0.06 NG/ML (ref 0–0.25)
PROTHROMBIN TIME: 35.9 SECONDS (ref 11.7–14.2)
SARS-COV-2 RNA RESP QL NAA+PROBE: NOT DETECTED
SODIUM SERPL-SCNC: 142 MMOL/L (ref 136–145)
TSH SERPL DL<=0.05 MIU/L-ACNC: 0.8 UIU/ML (ref 0.27–4.2)

## 2020-09-30 PROCEDURE — 80048 BASIC METABOLIC PNL TOTAL CA: CPT | Performed by: HOSPITALIST

## 2020-09-30 PROCEDURE — 82962 GLUCOSE BLOOD TEST: CPT

## 2020-09-30 PROCEDURE — 84443 ASSAY THYROID STIM HORMONE: CPT | Performed by: HOSPITALIST

## 2020-09-30 PROCEDURE — 72070 X-RAY EXAM THORAC SPINE 2VWS: CPT

## 2020-09-30 PROCEDURE — 87186 SC STD MICRODIL/AGAR DIL: CPT | Performed by: HOSPITALIST

## 2020-09-30 PROCEDURE — 87070 CULTURE OTHR SPECIMN AEROBIC: CPT | Performed by: HOSPITALIST

## 2020-09-30 PROCEDURE — 25010000002 METHYLPREDNISOLONE PER 125 MG: Performed by: HOSPITALIST

## 2020-09-30 PROCEDURE — 94640 AIRWAY INHALATION TREATMENT: CPT

## 2020-09-30 PROCEDURE — 63710000001 INSULIN LISPRO (HUMAN) PER 5 UNITS: Performed by: HOSPITALIST

## 2020-09-30 PROCEDURE — 87077 CULTURE AEROBIC IDENTIFY: CPT | Performed by: HOSPITALIST

## 2020-09-30 PROCEDURE — 94799 UNLISTED PULMONARY SVC/PX: CPT

## 2020-09-30 PROCEDURE — 93970 EXTREMITY STUDY: CPT

## 2020-09-30 PROCEDURE — 87205 SMEAR GRAM STAIN: CPT | Performed by: HOSPITALIST

## 2020-09-30 PROCEDURE — 99232 SBSQ HOSP IP/OBS MODERATE 35: CPT | Performed by: INTERNAL MEDICINE

## 2020-09-30 PROCEDURE — 85610 PROTHROMBIN TIME: CPT | Performed by: HOSPITALIST

## 2020-09-30 PROCEDURE — 84145 PROCALCITONIN (PCT): CPT | Performed by: HOSPITALIST

## 2020-09-30 RX ORDER — ACETAMINOPHEN 500 MG
1000 TABLET ORAL 2 TIMES DAILY
COMMUNITY
End: 2020-10-14 | Stop reason: HOSPADM

## 2020-09-30 RX ORDER — GLIPIZIDE 5 MG/1
2.5 TABLET ORAL EVERY MORNING
Status: DISCONTINUED | OUTPATIENT
Start: 2020-09-30 | End: 2020-10-14 | Stop reason: HOSPADM

## 2020-09-30 RX ORDER — WARFARIN SODIUM 2 MG/1
2 TABLET ORAL
Status: DISCONTINUED | OUTPATIENT
Start: 2020-09-30 | End: 2020-09-30

## 2020-09-30 RX ORDER — CEPHALEXIN 500 MG/1
500 CAPSULE ORAL 3 TIMES DAILY
Status: DISCONTINUED | OUTPATIENT
Start: 2020-09-30 | End: 2020-10-02

## 2020-09-30 RX ORDER — CEPHALEXIN 500 MG/1
500 CAPSULE ORAL 3 TIMES DAILY
COMMUNITY
End: 2021-02-22 | Stop reason: ALTCHOICE

## 2020-09-30 RX ORDER — METHYLPREDNISOLONE SODIUM SUCCINATE 125 MG/2ML
80 INJECTION, POWDER, LYOPHILIZED, FOR SOLUTION INTRAMUSCULAR; INTRAVENOUS ONCE
Status: COMPLETED | OUTPATIENT
Start: 2020-09-30 | End: 2020-09-30

## 2020-09-30 RX ORDER — IPRATROPIUM BROMIDE AND ALBUTEROL SULFATE 2.5; .5 MG/3ML; MG/3ML
3 SOLUTION RESPIRATORY (INHALATION)
Status: DISCONTINUED | OUTPATIENT
Start: 2020-09-30 | End: 2020-10-14 | Stop reason: HOSPADM

## 2020-09-30 RX ORDER — FUROSEMIDE 20 MG/1
20 TABLET ORAL EVERY MORNING
Status: DISCONTINUED | OUTPATIENT
Start: 2020-09-30 | End: 2020-10-14 | Stop reason: HOSPADM

## 2020-09-30 RX ORDER — OXYBUTYNIN CHLORIDE 10 MG/1
10 TABLET, EXTENDED RELEASE ORAL NIGHTLY
Status: DISCONTINUED | OUTPATIENT
Start: 2020-09-30 | End: 2020-10-14 | Stop reason: HOSPADM

## 2020-09-30 RX ORDER — NICOTINE POLACRILEX 4 MG
15 LOZENGE BUCCAL
Status: DISCONTINUED | OUTPATIENT
Start: 2020-09-30 | End: 2020-10-14 | Stop reason: HOSPADM

## 2020-09-30 RX ORDER — SODIUM CHLORIDE FOR INHALATION 7 %
4 VIAL, NEBULIZER (ML) INHALATION 2 TIMES DAILY
Status: DISCONTINUED | OUTPATIENT
Start: 2020-09-30 | End: 2020-10-08

## 2020-09-30 RX ORDER — ONDANSETRON 4 MG/1
4 TABLET, FILM COATED ORAL EVERY 6 HOURS PRN
Status: DISCONTINUED | OUTPATIENT
Start: 2020-09-30 | End: 2020-10-14 | Stop reason: HOSPADM

## 2020-09-30 RX ORDER — MONTELUKAST SODIUM 10 MG/1
10 TABLET ORAL NIGHTLY
Status: DISCONTINUED | OUTPATIENT
Start: 2020-09-30 | End: 2020-10-14 | Stop reason: HOSPADM

## 2020-09-30 RX ORDER — DEXTROSE MONOHYDRATE 25 G/50ML
25 INJECTION, SOLUTION INTRAVENOUS
Status: DISCONTINUED | OUTPATIENT
Start: 2020-09-30 | End: 2020-10-14 | Stop reason: HOSPADM

## 2020-09-30 RX ORDER — PANTOPRAZOLE SODIUM 40 MG/1
40 TABLET, DELAYED RELEASE ORAL DAILY
Status: DISCONTINUED | OUTPATIENT
Start: 2020-09-30 | End: 2020-10-14 | Stop reason: HOSPADM

## 2020-09-30 RX ORDER — ACETAMINOPHEN 650 MG/1
650 SUPPOSITORY RECTAL EVERY 4 HOURS PRN
Status: DISCONTINUED | OUTPATIENT
Start: 2020-09-30 | End: 2020-10-14 | Stop reason: HOSPADM

## 2020-09-30 RX ORDER — ACETAMINOPHEN 160 MG/5ML
650 SOLUTION ORAL EVERY 4 HOURS PRN
Status: DISCONTINUED | OUTPATIENT
Start: 2020-09-30 | End: 2020-10-14 | Stop reason: HOSPADM

## 2020-09-30 RX ORDER — SODIUM CHLORIDE FOR INHALATION 7 %
4 VIAL, NEBULIZER (ML) INHALATION 2 TIMES DAILY
COMMUNITY
End: 2020-10-14 | Stop reason: HOSPADM

## 2020-09-30 RX ORDER — FLUTICASONE PROPIONATE 50 MCG
1 SPRAY, SUSPENSION (ML) NASAL 2 TIMES DAILY
Status: DISCONTINUED | OUTPATIENT
Start: 2020-09-30 | End: 2020-10-14 | Stop reason: HOSPADM

## 2020-09-30 RX ORDER — ASPIRIN 81 MG/1
81 TABLET ORAL DAILY
Status: DISCONTINUED | OUTPATIENT
Start: 2020-10-01 | End: 2020-10-14 | Stop reason: HOSPADM

## 2020-09-30 RX ORDER — ONDANSETRON 2 MG/ML
4 INJECTION INTRAMUSCULAR; INTRAVENOUS EVERY 6 HOURS PRN
Status: DISCONTINUED | OUTPATIENT
Start: 2020-09-30 | End: 2020-10-14 | Stop reason: HOSPADM

## 2020-09-30 RX ORDER — LOSARTAN POTASSIUM 25 MG/1
25 TABLET ORAL DAILY
Status: DISCONTINUED | OUTPATIENT
Start: 2020-09-30 | End: 2020-10-14 | Stop reason: HOSPADM

## 2020-09-30 RX ORDER — CARVEDILOL 3.12 MG/1
3.12 TABLET ORAL 2 TIMES DAILY
Status: DISCONTINUED | OUTPATIENT
Start: 2020-09-30 | End: 2020-10-14 | Stop reason: HOSPADM

## 2020-09-30 RX ORDER — SODIUM CHLORIDE 0.9 % (FLUSH) 0.9 %
10 SYRINGE (ML) INJECTION AS NEEDED
Status: DISCONTINUED | OUTPATIENT
Start: 2020-09-30 | End: 2020-10-14 | Stop reason: HOSPADM

## 2020-09-30 RX ORDER — OXAZEPAM 10 MG
10 CAPSULE ORAL NIGHTLY
Status: DISCONTINUED | OUTPATIENT
Start: 2020-09-30 | End: 2020-10-14 | Stop reason: HOSPADM

## 2020-09-30 RX ORDER — BUDESONIDE AND FORMOTEROL FUMARATE DIHYDRATE 160; 4.5 UG/1; UG/1
2 AEROSOL RESPIRATORY (INHALATION)
Status: DISCONTINUED | OUTPATIENT
Start: 2020-09-30 | End: 2020-10-14 | Stop reason: HOSPADM

## 2020-09-30 RX ORDER — ACETAMINOPHEN 325 MG/1
650 TABLET ORAL EVERY 4 HOURS PRN
Status: DISCONTINUED | OUTPATIENT
Start: 2020-09-30 | End: 2020-10-14 | Stop reason: HOSPADM

## 2020-09-30 RX ORDER — ROSUVASTATIN CALCIUM 20 MG/1
20 TABLET, COATED ORAL NIGHTLY
Status: DISCONTINUED | OUTPATIENT
Start: 2020-09-30 | End: 2020-10-14 | Stop reason: HOSPADM

## 2020-09-30 RX ORDER — SODIUM CHLORIDE 0.9 % (FLUSH) 0.9 %
10 SYRINGE (ML) INJECTION EVERY 12 HOURS SCHEDULED
Status: DISCONTINUED | OUTPATIENT
Start: 2020-09-30 | End: 2020-10-07

## 2020-09-30 RX ADMIN — OXAZEPAM 10 MG: 10 CAPSULE, GELATIN COATED ORAL at 23:06

## 2020-09-30 RX ADMIN — LOSARTAN POTASSIUM 25 MG: 25 TABLET, FILM COATED ORAL at 10:20

## 2020-09-30 RX ADMIN — ACETAMINOPHEN 650 MG: 325 TABLET, FILM COATED ORAL at 23:06

## 2020-09-30 RX ADMIN — FLUTICASONE PROPIONATE 1 SPRAY: 50 SPRAY, METERED NASAL at 20:20

## 2020-09-30 RX ADMIN — OXYBUTYNIN CHLORIDE 10 MG: 10 TABLET, EXTENDED RELEASE ORAL at 20:18

## 2020-09-30 RX ADMIN — FLUTICASONE PROPIONATE 1 SPRAY: 50 SPRAY, METERED NASAL at 10:21

## 2020-09-30 RX ADMIN — SODIUM CHLORIDE, PRESERVATIVE FREE 10 ML: 5 INJECTION INTRAVENOUS at 01:42

## 2020-09-30 RX ADMIN — FUROSEMIDE 20 MG: 20 TABLET ORAL at 06:55

## 2020-09-30 RX ADMIN — IPRATROPIUM BROMIDE AND ALBUTEROL SULFATE 3 ML: 2.5; .5 SOLUTION RESPIRATORY (INHALATION) at 16:17

## 2020-09-30 RX ADMIN — BUDESONIDE AND FORMOTEROL FUMARATE DIHYDRATE 2 PUFF: 160; 4.5 AEROSOL RESPIRATORY (INHALATION) at 20:48

## 2020-09-30 RX ADMIN — OXAZEPAM 10 MG: 10 CAPSULE, GELATIN COATED ORAL at 01:41

## 2020-09-30 RX ADMIN — CARVEDILOL 3.12 MG: 3.12 TABLET, FILM COATED ORAL at 10:20

## 2020-09-30 RX ADMIN — INSULIN LISPRO 12 UNITS: 100 INJECTION, SOLUTION INTRAVENOUS; SUBCUTANEOUS at 18:01

## 2020-09-30 RX ADMIN — CEPHALEXIN 500 MG: 500 CAPSULE ORAL at 18:01

## 2020-09-30 RX ADMIN — PANTOPRAZOLE SODIUM 40 MG: 40 TABLET, DELAYED RELEASE ORAL at 10:21

## 2020-09-30 RX ADMIN — SODIUM CHLORIDE, PRESERVATIVE FREE 10 ML: 5 INJECTION INTRAVENOUS at 20:20

## 2020-09-30 RX ADMIN — CARVEDILOL 3.12 MG: 3.12 TABLET, FILM COATED ORAL at 20:18

## 2020-09-30 RX ADMIN — GLIPIZIDE 2.5 MG: 5 TABLET ORAL at 06:55

## 2020-09-30 RX ADMIN — IPRATROPIUM BROMIDE AND ALBUTEROL SULFATE 3 ML: 2.5; .5 SOLUTION RESPIRATORY (INHALATION) at 07:20

## 2020-09-30 RX ADMIN — CEPHALEXIN 500 MG: 500 CAPSULE ORAL at 20:18

## 2020-09-30 RX ADMIN — ROSUVASTATIN CALCIUM 20 MG: 20 TABLET, FILM COATED ORAL at 20:18

## 2020-09-30 RX ADMIN — POTASSIUM CHLORIDE 40 MEQ: 1.5 POWDER, FOR SOLUTION ORAL at 01:41

## 2020-09-30 RX ADMIN — IPRATROPIUM BROMIDE AND ALBUTEROL SULFATE 3 ML: 2.5; .5 SOLUTION RESPIRATORY (INHALATION) at 22:03

## 2020-09-30 RX ADMIN — Medication 4 ML: at 22:03

## 2020-09-30 RX ADMIN — INSULIN LISPRO 6 UNITS: 100 INJECTION, SOLUTION INTRAVENOUS; SUBCUTANEOUS at 12:23

## 2020-09-30 RX ADMIN — METHYLPREDNISOLONE SODIUM SUCCINATE 80 MG: 125 INJECTION, POWDER, FOR SOLUTION INTRAMUSCULAR; INTRAVENOUS at 01:41

## 2020-09-30 RX ADMIN — MONTELUKAST SODIUM 10 MG: 10 TABLET, FILM COATED ORAL at 20:18

## 2020-09-30 RX ADMIN — BUDESONIDE AND FORMOTEROL FUMARATE DIHYDRATE 2 PUFF: 160; 4.5 AEROSOL RESPIRATORY (INHALATION) at 11:52

## 2020-09-30 NOTE — TELEPHONE ENCOUNTER
----- Message from Angelica Sandoval sent at 9/29/2020  4:54 PM EDT -----  Calling to get appt for tommorow cancelled because pt is going to ER via ambulance right now. Thanks, Patel

## 2020-10-01 LAB
ALBUMIN SERPL-MCNC: 3.5 G/DL (ref 3.5–5.2)
ALBUMIN/GLOB SERPL: 1.5 G/DL
ALP SERPL-CCNC: 91 U/L (ref 39–117)
ALT SERPL W P-5'-P-CCNC: 19 U/L (ref 1–33)
ANION GAP SERPL CALCULATED.3IONS-SCNC: 9.6 MMOL/L (ref 5–15)
AST SERPL-CCNC: 16 U/L (ref 1–32)
B PARAPERT DNA SPEC QL NAA+PROBE: NOT DETECTED
B PERT DNA SPEC QL NAA+PROBE: NOT DETECTED
BILIRUB SERPL-MCNC: 0.4 MG/DL (ref 0–1.2)
BUN SERPL-MCNC: 18 MG/DL (ref 8–23)
BUN/CREAT SERPL: 20 (ref 7–25)
BURR CELLS BLD QL SMEAR: ABNORMAL
C PNEUM DNA NPH QL NAA+NON-PROBE: NOT DETECTED
CALCIUM SPEC-SCNC: 8.5 MG/DL (ref 8.6–10.5)
CHLORIDE SERPL-SCNC: 103 MMOL/L (ref 98–107)
CO2 SERPL-SCNC: 27.4 MMOL/L (ref 22–29)
CREAT SERPL-MCNC: 0.9 MG/DL (ref 0.57–1)
DEPRECATED RDW RBC AUTO: 43 FL (ref 37–54)
ERYTHROCYTE [DISTWIDTH] IN BLOOD BY AUTOMATED COUNT: 12.3 % (ref 12.3–15.4)
FLUAV H1 2009 PAND RNA NPH QL NAA+PROBE: NOT DETECTED
FLUAV H1 HA GENE NPH QL NAA+PROBE: NOT DETECTED
FLUAV H3 RNA NPH QL NAA+PROBE: NOT DETECTED
FLUAV SUBTYP SPEC NAA+PROBE: NOT DETECTED
FLUBV RNA ISLT QL NAA+PROBE: NOT DETECTED
GFR SERPL CREATININE-BSD FRML MDRD: 60 ML/MIN/1.73
GLOBULIN UR ELPH-MCNC: 2.3 GM/DL
GLUCOSE BLDC GLUCOMTR-MCNC: 130 MG/DL (ref 70–130)
GLUCOSE BLDC GLUCOMTR-MCNC: 136 MG/DL (ref 70–130)
GLUCOSE BLDC GLUCOMTR-MCNC: 137 MG/DL (ref 70–130)
GLUCOSE SERPL-MCNC: 189 MG/DL (ref 65–99)
HADV DNA SPEC NAA+PROBE: NOT DETECTED
HCOV 229E RNA SPEC QL NAA+PROBE: NOT DETECTED
HCOV HKU1 RNA SPEC QL NAA+PROBE: NOT DETECTED
HCOV NL63 RNA SPEC QL NAA+PROBE: NOT DETECTED
HCOV OC43 RNA SPEC QL NAA+PROBE: NOT DETECTED
HCT VFR BLD AUTO: 35.1 % (ref 34–46.6)
HGB BLD-MCNC: 11.3 G/DL (ref 12–15.9)
HMPV RNA NPH QL NAA+NON-PROBE: NOT DETECTED
HPIV1 RNA SPEC QL NAA+PROBE: NOT DETECTED
HPIV2 RNA SPEC QL NAA+PROBE: NOT DETECTED
HPIV3 RNA NPH QL NAA+PROBE: NOT DETECTED
HPIV4 P GENE NPH QL NAA+PROBE: NOT DETECTED
INR PPP: 3.6 (ref 0.9–1.1)
LYMPHOCYTES # BLD MANUAL: 11.28 10*3/MM3 (ref 0.7–3.1)
LYMPHOCYTES NFR BLD MANUAL: 48.3 % (ref 19.6–45.3)
M PNEUMO IGG SER IA-ACNC: NOT DETECTED
MCH RBC QN AUTO: 30.3 PG (ref 26.6–33)
MCHC RBC AUTO-ENTMCNC: 32.2 G/DL (ref 31.5–35.7)
MCV RBC AUTO: 94.1 FL (ref 79–97)
NEUTROPHILS # BLD AUTO: 12.08 10*3/MM3 (ref 1.7–7)
NEUTROPHILS NFR BLD MANUAL: 51.7 % (ref 42.7–76)
OVALOCYTES BLD QL SMEAR: ABNORMAL
PLAT MORPH BLD: NORMAL
PLATELET # BLD AUTO: 145 10*3/MM3 (ref 140–450)
PMV BLD AUTO: 10.2 FL (ref 6–12)
POIKILOCYTOSIS BLD QL SMEAR: ABNORMAL
POTASSIUM SERPL-SCNC: 3.9 MMOL/L (ref 3.5–5.2)
PROT SERPL-MCNC: 5.8 G/DL (ref 6–8.5)
PROTHROMBIN TIME: 34.5 SECONDS (ref 11.7–14.2)
RBC # BLD AUTO: 3.73 10*6/MM3 (ref 3.77–5.28)
RHINOVIRUS RNA SPEC NAA+PROBE: DETECTED
RSV RNA NPH QL NAA+NON-PROBE: NOT DETECTED
SARS-COV-2 RNA NPH QL NAA+NON-PROBE: NOT DETECTED
SMUDGE CELLS BLD QL SMEAR: ABNORMAL
SODIUM SERPL-SCNC: 140 MMOL/L (ref 136–145)
WBC # BLD AUTO: 23.36 10*3/MM3 (ref 3.4–10.8)

## 2020-10-01 PROCEDURE — 94799 UNLISTED PULMONARY SVC/PX: CPT

## 2020-10-01 PROCEDURE — 99223 1ST HOSP IP/OBS HIGH 75: CPT | Performed by: INTERNAL MEDICINE

## 2020-10-01 PROCEDURE — 85007 BL SMEAR W/DIFF WBC COUNT: CPT | Performed by: HOSPITALIST

## 2020-10-01 PROCEDURE — 97110 THERAPEUTIC EXERCISES: CPT

## 2020-10-01 PROCEDURE — 94664 DEMO&/EVAL PT USE INHALER: CPT

## 2020-10-01 PROCEDURE — 85025 COMPLETE CBC W/AUTO DIFF WBC: CPT | Performed by: HOSPITALIST

## 2020-10-01 PROCEDURE — 82962 GLUCOSE BLOOD TEST: CPT

## 2020-10-01 PROCEDURE — 80053 COMPREHEN METABOLIC PANEL: CPT | Performed by: INTERNAL MEDICINE

## 2020-10-01 PROCEDURE — 99221 1ST HOSP IP/OBS SF/LOW 40: CPT | Performed by: NURSE PRACTITIONER

## 2020-10-01 PROCEDURE — 0202U NFCT DS 22 TRGT SARS-COV-2: CPT | Performed by: INTERNAL MEDICINE

## 2020-10-01 PROCEDURE — 99232 SBSQ HOSP IP/OBS MODERATE 35: CPT | Performed by: INTERNAL MEDICINE

## 2020-10-01 PROCEDURE — 63710000001 INSULIN LISPRO (HUMAN) PER 5 UNITS: Performed by: HOSPITALIST

## 2020-10-01 PROCEDURE — 85610 PROTHROMBIN TIME: CPT | Performed by: HOSPITALIST

## 2020-10-01 PROCEDURE — 97161 PT EVAL LOW COMPLEX 20 MIN: CPT

## 2020-10-01 RX ADMIN — SODIUM CHLORIDE, PRESERVATIVE FREE 10 ML: 5 INJECTION INTRAVENOUS at 21:52

## 2020-10-01 RX ADMIN — CARVEDILOL 3.12 MG: 3.12 TABLET, FILM COATED ORAL at 21:51

## 2020-10-01 RX ADMIN — ASPIRIN 81 MG: 81 TABLET, COATED ORAL at 09:28

## 2020-10-01 RX ADMIN — CEPHALEXIN 500 MG: 500 CAPSULE ORAL at 17:17

## 2020-10-01 RX ADMIN — FUROSEMIDE 20 MG: 20 TABLET ORAL at 05:58

## 2020-10-01 RX ADMIN — FLUTICASONE PROPIONATE 1 SPRAY: 50 SPRAY, METERED NASAL at 21:52

## 2020-10-01 RX ADMIN — SODIUM CHLORIDE, PRESERVATIVE FREE 10 ML: 5 INJECTION INTRAVENOUS at 09:30

## 2020-10-01 RX ADMIN — PANTOPRAZOLE SODIUM 40 MG: 40 TABLET, DELAYED RELEASE ORAL at 17:17

## 2020-10-01 RX ADMIN — CEPHALEXIN 500 MG: 500 CAPSULE ORAL at 21:51

## 2020-10-01 RX ADMIN — OXYBUTYNIN CHLORIDE 10 MG: 10 TABLET, EXTENDED RELEASE ORAL at 21:51

## 2020-10-01 RX ADMIN — ROSUVASTATIN CALCIUM 20 MG: 20 TABLET, FILM COATED ORAL at 21:51

## 2020-10-01 RX ADMIN — FLUTICASONE PROPIONATE 1 SPRAY: 50 SPRAY, METERED NASAL at 09:29

## 2020-10-01 RX ADMIN — IPRATROPIUM BROMIDE AND ALBUTEROL SULFATE 3 ML: 2.5; .5 SOLUTION RESPIRATORY (INHALATION) at 20:28

## 2020-10-01 RX ADMIN — MONTELUKAST SODIUM 10 MG: 10 TABLET, FILM COATED ORAL at 21:51

## 2020-10-01 RX ADMIN — INSULIN LISPRO 3 UNITS: 100 INJECTION, SOLUTION INTRAVENOUS; SUBCUTANEOUS at 09:28

## 2020-10-01 RX ADMIN — Medication 4 ML: at 12:43

## 2020-10-01 RX ADMIN — GLIPIZIDE 2.5 MG: 5 TABLET ORAL at 05:58

## 2020-10-01 RX ADMIN — IPRATROPIUM BROMIDE AND ALBUTEROL SULFATE 3 ML: 2.5; .5 SOLUTION RESPIRATORY (INHALATION) at 12:39

## 2020-10-01 RX ADMIN — ACETAMINOPHEN 650 MG: 325 TABLET, FILM COATED ORAL at 21:51

## 2020-10-01 RX ADMIN — BUDESONIDE AND FORMOTEROL FUMARATE DIHYDRATE 2 PUFF: 160; 4.5 AEROSOL RESPIRATORY (INHALATION) at 08:26

## 2020-10-01 RX ADMIN — CEPHALEXIN 500 MG: 500 CAPSULE ORAL at 09:28

## 2020-10-01 RX ADMIN — OXAZEPAM 10 MG: 10 CAPSULE, GELATIN COATED ORAL at 21:51

## 2020-10-01 RX ADMIN — LOSARTAN POTASSIUM 25 MG: 25 TABLET, FILM COATED ORAL at 09:28

## 2020-10-01 RX ADMIN — Medication 4 ML: at 20:28

## 2020-10-01 RX ADMIN — CARVEDILOL 3.12 MG: 3.12 TABLET, FILM COATED ORAL at 09:28

## 2020-10-01 NOTE — THERAPY EVALUATION
Patient Name: Caitlyn Longoria  : 1934    MRN: [id number]                              Today's Date: 10/1/2020       Admit Date: 2020    Visit Dx:     ICD-10-CM ICD-9-CM   1. Dyspnea, unspecified type  R06.00 786.09   2. Pneumonia due to infectious organism, unspecified laterality, unspecified part of lung  J18.9 486   3. Leukocytosis, unspecified type  D72.829 288.60     Patient Active Problem List   Diagnosis   • Neck and shoulder pain   • Arthropathy of shoulder region   • Carpal tunnel syndrome of left wrist   • Chronic pain of both shoulders   • Chronic left shoulder pain   • Arthropathy of left shoulder   • Dysarthria   • DM II (diabetes mellitus, type II), controlled (CMS/[facility])   • Paroxysmal atrial fibrillation (CMS/[facility])   • HLD (hyperlipidemia)   • Chronic bronchitis (CMS/[facility])   • Coronary artery disease involving native coronary artery of native heart with angina pectoris (CMS/[facility])   • CVA (cerebral vascular accident) (CMS/[facility])   • HTN (hypertension)   • CKD (chronic kidney disease), stage III   • Chronic combined systolic and diastolic congestive heart failure (CMS/[facility])   • Infection of prosthetic right knee joint (CMS/[facility])   • Stasis dermatitis of right lower extremity due to peripheral venous hypertension   • Current use of long term anticoagulation   • Morbid obesity (CMS/[facility])   • Acute respiratory failure with hypoxia (CMS/[facility])   • Rhinovirus   • Asthma with acute exacerbation   • Acute respiratory failure with hypoxemia (CMS/[facility])   • Viral pneumonia   • Hypoxia   • CLL (chronic lymphoid leukemia) in relapse (CMS/[facility])   • Dyspnea   • Anticoagulated on Coumadin   • Closed wedge compression fracture of T5 vertebra (CMS/[facility])     Past Medical History:   Diagnosis Date   • Aortic calcification (CMS/[facility])     mild, on echo 2017   • Aortic regurgitation     Trace   • Asthma    • Atrial fibrillation (CMS/[facility])    • CAD (coronary artery disease)    • Chronic combined systolic and diastolic  congestive heart failure (CMS/[facility])    • CKD (chronic kidney disease) stage 3, GFR 30-59 ml/min    • Coronary artery disease involving native coronary artery of native heart with angina pectoris (CMS/[facility])    • Disc degeneration, lumbar    • DM type 2 (diabetes mellitus, type 2) (CMS/[facility])    • GERD (gastroesophageal reflux disease)    • History of aneurysm     right femoral artery s/p LHC   • History of blood transfusion    • History of fracture    • History of vitamin D deficiency    • Hyperlipidemia    • Hypertension    • Mild mitral regurgitation    • Mitral annular calcification     12/8/2017- echo, moderate   • PAF (paroxysmal atrial fibrillation) (CMS/[facility])    • Peripheral neuropathy    • Skin cancer     Left hand   • Sleep apnea    • SSS (sick sinus syndrome) (CMS/[facility])    • Stroke (cerebrum) (CMS/[facility])    • Tricuspid regurgitation     Trace     Past Surgical History:   Procedure Laterality Date   • CARDIAC CATHETERIZATION     • CARDIAC ELECTROPHYSIOLOGY PROCEDURE N/A 2/7/2020    Procedure: PPM generator change - dual  medtronic;  Surgeon: Kiel Field MD;  Location: [facility] INVASIVE LOCATION;  Service: Cardiology;  Laterality: N/A;   • CHOLECYSTECTOMY     • CORONARY STENT PLACEMENT     • HERNIA REPAIR      hital hernia   • HYSTERECTOMY     • PACEMAKER IMPLANTATION     • REPLACEMENT TOTAL KNEE Bilateral      General Information     Row Name 10/01/20 1538          Physical Therapy Time and Intention    Document Type  evaluation  -DJ     Mode of Treatment  individual therapy;physical therapy  -DJ     Row Name 10/01/20 1538          General Information    Patient Profile Reviewed  yes  -DJ     Prior Level of Function  independent:;community mobility;ADL's  -DJ     Existing Precautions/Restrictions  brace worn when out of bed;fall  -DJ     Barriers to Rehab  medically complex  -DJ     Row Name 10/01/20 1539          Living Environment    Lives With  facility resident [place]  -DJ     Row Name 10/01/20  1538          Home Main Entrance    Number of Stairs, Main Entrance  none  -DJ     Row Name 10/01/20 1538          Stairs Within Home, Primary    Number of Stairs, Within Home, Primary  none  -DJ     Row Name 10/01/20 1538          Cognition    Orientation Status (Cognition)  oriented x 4  -DJ     Row Name 10/01/20 1538          Safety Issues, Functional Mobility    Impairments Affecting Function (Mobility)  endurance/activity tolerance  -DJ     Comment, Safety Issues/Impairments (Mobility)  grippy socks  -DJ       User Key  (r) = Recorded By, (t) = Taken By, (c) = Cosigned By    Initials Name Provider Type    Edie Lo, PT Physical Therapist        Mobility     Row Name 10/01/20 1540          Bed Mobility    Bed Mobility  supine-sit;sit-supine  -DJ     Supine-Sit [place] (Bed Mobility)  contact guard;verbal cues  -DJ     Sit-Supine [place] (Bed Mobility)  contact guard;verbal cues  -DJ     Assistive Device (Bed Mobility)  bed rails;head of bed elevated  -DJ     Comment (Bed Mobility)  Moves well - does not like to spend time in bed unless sleeping  -DJ     Row Name 10/01/20 1540          Transfers    Comment (Transfers)  sit/stand from EOB  -DJ     Row Name 10/01/20 1540          Bed-Chair Transfer    Bed-Chair [place] (Transfers)  contact guard;verbal cues  -DJ     Assistive Device (Bed-Chair Transfers)  walker, front-wheeled  -DJ     Row Name 10/01/20 1540          Sit-Stand Transfer    Sit-Stand [place] (Transfers)  contact guard;verbal cues  -DJ     Assistive Device (Sit-Stand Transfers)  walker, 4-wheeled  -DJ     Row Name 10/01/20 1540          Gait/Stairs (Locomotion)    [place] Level (Gait)  contact guard;verbal cues;1 person assist  -DJ     Assistive Device (Gait)  walker, front-wheeled  -DJ     Distance in Feet (Gait)  140'  -DJ     Deviations/Abnormal Patterns (Gait)  gait speed decreased;stride length decreased  -DJ     Bilateral Gait Deviations  forward flexed posture   -DJ     [place] Level (Stairs)  not tested  -DJ     Comment (Gait/Stairs)  Pt amb 140' with r wx on level surfaces slow pace and fair balance; flexed posture. SOB limits further amb distance  -DJ       User Key  (r) = Recorded By, (t) = Taken By, (c) = Cosigned By    Initials Name Provider Type    Edie Lo, PT Physical Therapist        Obj/Interventions     Row Name 10/01/20 1546          Range of Motion Comprehensive    General Range of Motion  no range of motion deficits identified  -DJ     Row Name 10/01/20 1546          Strength Comprehensive (MMT)    Comment, General Manual Muscle Testing (MMT) Assessment  Grossly 4/5 throughout  -DJ     Row Name 10/01/20 1546          Motor Skills    Therapeutic Exercise  -- AP, LAQ, seated marching 10x throughout the day  -DJ     Row Name 10/01/20 1546          Balance    Balance Assessment  sitting static balance;standing static balance  -DJ     Static Sitting Balance  WNL  -DJ     Static Standing Balance  WFL  -DJ     Row Name 10/01/20 1546          Sensory Assessment (Somatosensory)    Sensory Assessment (Somatosensory)  sensation intact  -DJ       User Key  (r) = Recorded By, (t) = Taken By, (c) = Cosigned By    Initials Name Provider Type    Edie Lo, PT Physical Therapist        Goals/Plan     Row Name 10/01/20 2564          Bed Mobility Goal 1 (PT)    Activity/Assistive Device (Bed Mobility Goal 1, PT)  sit to supine;supine to sit  -DJ     [place] Level/Cues Needed (Bed Mobility Goal 1, PT)  supervision required  -DJ     Time Frame (Bed Mobility Goal 1, PT)  1 week  -DJ     Row Name 10/01/20 9845          Transfer Goal 1 (PT)    Activity/Assistive Device (Transfer Goal 1, PT)  sit-to-stand/stand-to-sit;toilet  -DJ     [place] Level/Cues Needed (Transfer Goal 1, PT)  supervision required  -DJ     Time Frame (Transfer Goal 1, PT)  1 week  -DJ     Row Name 10/01/20 7812          Gait Training Goal 1 (PT)    Activity/Assistive Device (Gait  Training Goal 1, PT)  gait (walking locomotion);assistive device use;improve balance and speed;increase endurance/gait distance;increase energy conservation;walker, rolling  -DJ     [place] Level (Gait Training Goal 1, PT)  supervision required  -DJ     Distance (Gait Training Goal 1, PT)  >350'  -DJ     Time Frame (Gait Training Goal 1, PT)  1 week  -DJ     Strategies/Barriers (Gait Training Goal 1, PT)  endurance  -DJ     Row Name 10/01/20 3684          Patient Education Goal (PT)    Activity (Patient Education Goal, PT)  HEP  -DJ     [place]/Cues/Accuracy (Memory Goal 2, PT)  demonstrates adequately;verbalizes understanding  -DJ     Time Frame (Patient Education Goal, PT)  1 week  -DJ       User Key  (r) = Recorded By, (t) = Taken By, (c) = Cosigned By    Initials Name Provider Type    Edie Lo, PT Physical Therapist        Clinical Impression     Row Name 10/01/20 7563          Pain    Additional Documentation  Pain Scale: Numbers Pre/Post-Treatment (Group)  -DJ     Row Name 10/01/20 2621          Pain Scale: Numbers Pre/Post-Treatment    Pain Location  back  -DJ     Pre/Posttreatment Pain Comment  c/o discomfort between sh blades  -DJ     Pain Intervention(s)  Repositioned;Rest;Ambulation/increased activity  -DJ     Row Name 10/01/20 9986          Plan of Care Review    Plan of Care Reviewed With  patient  -DJ     Outcome Summary  84yo obese white female admitted 9/29/20 due to chronic SOA. PMH includes pneumonia, high cholesterol, CAD, CKD III, stroke, heart failure, afib, DM II. PLOF: Pt lived at [place] used r wx for mobility, independent with ADLs. Pt is primarily limited at this time by decreased endurance and increased discomfort in T spine. Pt req only CGA for bed mobility and transfers. She was very eager to amb - she amb 140' with r wx. Brace rep present to instruct in don/doffing TLSO brace.  She would benefit from skilled PT for ther ex, gt/transfer training, balance, and  activity tolerance to prepare for d/c to [place]. Pt requesting home PT upon d/c.  -DJ     Row Name 10/01/20 1547          Therapy Assessment/Plan (PT)    Patient/Family Therapy Goals Statement (PT)  Return to [facility]  -DJ     Rehab Potential (PT)  good, to achieve stated therapy goals  -DJ     Criteria for Skilled Interventions Met (PT)  yes;meets criteria;skilled treatment is necessary  -DJ     Row Name 10/01/20 1547          Vital Signs    O2 Delivery Pre Treatment  supplemental O2 1.5  -DJ     O2 Delivery Intra Treatment  room air  -DJ     O2 Delivery Post Treatment  supplemental O2 1.5  -DJ     Pre Patient Position  Sitting  -DJ     Intra Patient Position  Standing  -DJ     Post Patient Position  Sitting  -DJ     Row Name 10/01/20 1547          Positioning and Restraints    Pre-Treatment Position  sitting in chair/recliner  -DJ     Post Treatment Position  chair  -DJ     In Chair  notified nsg;reclined;call light within reach;encouraged to call for assist;exit alarm on;legs elevated  -DJ       User Key  (r) = Recorded By, (t) = Taken By, (c) = Cosigned By    Initials Name Provider Type    Edie Lo, PT Physical Therapist        Outcome Measures     Row Name 10/01/20 0477          How much help from another person do you currently need...    Turning from your back to your side while in flat bed without using bedrails?  4  -DJ     Moving from lying on back to sitting on the side of a flat bed without bedrails?  4  -DJ     Moving to and from a bed to a chair (including a wheelchair)?  3  -DJ     Standing up from a chair using your arms (e.g., wheelchair, bedside chair)?  3  -DJ     Climbing 3-5 steps with a railing?  2  -DJ     To walk in hospital room?  3  -DJ     AM-PAC 6 Clicks Score (PT)  19  -DJ     Row Name 10/01/20 1557          Functional Assessment    Outcome Measure Options  AM-PAC 6 Clicks Basic Mobility (PT)  -DJ       User Key  (r) = Recorded By, (t) = Taken By, (c) = Cosigned By     Initials Name Provider Type    Edie Lo, CHERRY Physical Therapist        Physical Therapy Education                 Title: PT OT SLP Therapies (Done)     Topic: Physical Therapy (Done)     Point: Mobility training (Done)     Learning Progress Summary           Patient Eager, TB, DU,NR by MARIBELL at 10/1/2020 1558    Comment: review TLSO                   Point: Home exercise program (Done)     Learning Progress Summary           Patient Eager, TB, DU,NR by MARIBELL at 10/1/2020 1558    Comment: review TLSO                   Point: Body mechanics (Done)     Learning Progress Summary           Patient Eager, TB, DU,NR by MARIBELL at 10/1/2020 1558    Comment: review TLSO                   Point: Precautions (Done)     Learning Progress Summary           Patient Eager, TB, DU,NR by MARIBELL at 10/1/2020 1558    Comment: review TLSO                               User Key     Initials Effective Dates Name Provider Type Discipline    MARIBELL 10/25/19 -  Edie Lowry, CHERRY Physical Therapist PT              PT Recommendation and Plan  Planned Therapy Interventions (PT): balance training, gait training, home exercise program, strengthening, postural re-education, patient/family education, transfer training  Plan of Care Reviewed With: patient  Outcome Summary: 84yo obese white female admitted 9/29/20 due to chronic SOA. PMH includes pneumonia, high cholesterol, CAD, CKD III, stroke, heart failure, afib, DM II. PLOF: Pt lived at [place] used r wx for mobility, independent with ADLs. Pt is primarily limited at this time by decreased endurance and increased discomfort in T spine. Pt req only CGA for bed mobility and transfers. She was very eager to amb - she amb 140' with r wx. Brace rep present to instruct in don/doffing TLSO brace.  She would benefit from skilled PT for ther ex, gt/transfer training, balance, and activity tolerance to prepare for d/c to [place]. Pt requesting home PT upon d/c.     Time Calculation:   PT Charges     Row Name  10/01/20 1559             Time Calculation    Start Time  1508  -DJ      Stop Time  1536  -DJ      Time Calculation (min)  28 min  -DJ      PT Non-Billable Time (min)  15 min  -DJ      PT Received On  10/01/20  -DJ      PT - Next Appointment  10/02/20  -DJ      PT Goal Re-Cert Due Date  10/08/20  -DJ        User Key  (r) = Recorded By, (t) = Taken By, (c) = Cosigned By    Initials Name Provider Type    Edie Lo, PT Physical Therapist        Therapy Charges for Today     Code Description Service Date Service Provider Modifiers Qty    58786169546 HC PT EVAL LOW COMPLEXITY 2 10/1/2020 Edie Lowry, PT GP 1    48038964046 HC PT THER PROC EA 15 MIN 10/1/2020 Edie Lowry, PT GP 1          PT G-Codes  Outcome Measure Options: AM-PAC 6 Clicks Basic Mobility (PT)  AM-PAC 6 Clicks Score (PT): 19    Edie Lowry PT  10/1/2020

## 2020-10-01 NOTE — H&P
"DAILY PROGRESS NOTE  [facility]    Patient Identification:  Name: Caitlyn Longoria  Age: 85 y.o.  Sex: female  :  1934  MRN: [id number]         Primary Care Physician: Zenaida Suarez MD    Subjective:  Interval History:Short of air on 2 L O 2 .    Objective:    Scheduled Meds:aspirin, 81 mg, Oral, Daily  budesonide-formoterol, 2 puff, Inhalation, BID - RT  carvedilol, 3.125 mg, Oral, BID  cephalexin, 500 mg, Oral, TID  fluticasone, 1 spray, Nasal, BID  furosemide, 20 mg, Oral, QAM  glipizide, 2.5 mg, Oral, QAM  insulin lispro, 0-14 Units, Subcutaneous, TID AC  ipratropium-albuterol, 3 mL, Nebulization, 4x Daily - RT  losartan, 25 mg, Oral, Daily  montelukast, 10 mg, Oral, Nightly  oxazepam, 10 mg, Oral, Nightly  oxybutynin XL, 10 mg, Oral, Nightly  pantoprazole, 40 mg, Oral, Daily  rosuvastatin, 20 mg, Oral, Nightly  sodium chloride, 10 mL, Intravenous, Q12H  sodium chloride, 4 mL, Nebulization, BID  warfarin (COUMADIN) (dosing per levels), , Does not apply, Daily      Continuous Infusions:Pharmacy to dose warfarin,         Vital signs in last 24 hours:  Temp:  [97.5 °F (36.4 °C)-98.6 °F (37 °C)] 97.5 °F (36.4 °C)  Heart Rate:  [79-99] 81  Resp:  [20-24] 20  BP: (106-129)/(76-89) 119/76    Intake/Output:    Intake/Output Summary (Last 24 hours) at 10/1/2020 1633  Last data filed at 10/1/2020 1300  Gross per 24 hour   Intake 1440 ml   Output 950 ml   Net 490 ml       Exam:  /76 (BP Location: Right arm, Patient Position: Sitting)   Pulse 81   Temp 97.5 °F (36.4 °C) (Oral)   Resp 20   Ht 157.5 cm (62\")   Wt 95.7 kg (211 lb)   SpO2 95%   BMI 38.59 kg/m²     General Appearance:    Alert, cooperative, no distress   Head:    Normocephalic, without obvious abnormality, atraumatic   Eyes:       Throat:   Lips, tongue, gums normal   Neck:   Supple, symmetrical, trachea midline, no JVD   Lungs:     Rhonchi and hezes. bilaterally, respirations unlabored   Chest Wall:    No " tenderness or deformity    Heart:    Regular rate and rhythm, S1 and S2 normal, no murmur,no  Rub or gallop   Abdomen:     Soft, nontender, bowel sounds active, no masses, no organomegaly    Extremities:   Extremities normal, atraumatic, no cyanosis or edema   Pulses:      Skin:   Skin is warm and dry,  no rashes or palpable lesions   Neurologic:   no focal deficits noted      Lab Results (last 72 hours)     Procedure Component Value Units Date/Time    POC Glucose Once [[id number]]  (Abnormal) Collected: 09/30/20 1557    Specimen: Blood Updated: 09/30/20 1559     Glucose 358 mg/dL     POC Glucose Once [[id number]]  (Abnormal) Collected: 09/30/20 1158    Specimen: Blood Updated: 09/30/20 1205     Glucose 366 mg/dL     COVID PRE-OP / PRE-PROCEDURE SCREENING ORDER (NO ISOLATION) - Swab, Nasopharynx [[id number]] Collected: 09/29/20 1819    Specimen: Swab from Nasopharynx Updated: 09/30/20 1135    Narrative:      The following orders were created for panel order COVID PRE-OP / PRE-PROCEDURE SCREENING ORDER (NO ISOLATION) - Swab, Nasopharynx.  Procedure                               Abnormality         Status                     ---------                               -----------         ------                     COVID-19,BIOTAP, NP/OP S...[[id number]]                      Final result                 Please view results for these tests on the individual orders.    COVID-19,BIOTAP, NP/OP SWAB IN TRANSPORT MEDIA OR SALINE 24-36 HR TAT - Swab, Nasopharynx [[id number]] Collected: 09/29/20 1819    Specimen: Swab from Nasopharynx Updated: 09/30/20 1135     SARS-CoV-2 PCR Not Detected     Comment: Nucleic acid specific to SARS-CoV-2 (COVID-19) virus was not detected inthis sample by the TaqPath (TM) COVID-19 Combo Kit.SARS-CoV-2 (COVID-19) nucleic acid testing performed using [employer] Aptima (R) SARS-CoV-2 Assay or [employer] TaqPath   (TM)COVID-19 Combo Kit as indicated above under Emergency Use Authorization (EUA)  "from the FDA. Aptima (R) and TaqPath (TM) test performancecharacteristics verified by [facility] in accordance with the FDAs Guidance Document (Policy for Diagnostic   Tests for Coronavirus Disease-2019during the Public Health Emergency) issued on March 16, 2020. The laboratory is regulated under CLIA as qualified to perform high-complexity testingUnless otherwise noted, all testing was performed at [facility],   CLIA No. [id number], [place] State Licensee No. [id number]       Respiratory Culture - Sputum, Cough [[id number]] Collected: 09/30/20 1114    Specimen: Sputum from Cough Updated: 09/30/20 1123    Procalcitonin [[id number]]  (Normal) Collected: 09/30/20 0702    Specimen: Blood from Hand, Left Updated: 09/30/20 0810     Procalcitonin 0.06 ng/mL     Narrative:      As a Marker for Sepsis (Non-Neonates):   1. <0.5 ng/mL represents a low risk of severe sepsis and/or septic shock.  1. >2 ng/mL represents a high risk of severe sepsis and/or septic shock.    As a Marker for Lower Respiratory Tract Infections that require antibiotic therapy:  PCT on Admission     Antibiotic Therapy             6-12 Hrs later  > 0.5                Strongly Recommended            >0.25 - <0.5         Recommended  0.1 - 0.25           Discouraged                   Remeasure/reassess PCT  <0.1                 Strongly Discouraged          Remeasure/reassess PCT      As 28 day mortality risk marker: \"Change in Procalcitonin Result\" (> 80 % or <=80 %) if Day 0 (or Day 1) and Day 4 values are available. Refer to http://www.[employer]-pct-calculator.com/   Change in PCT <=80 %   A decrease of PCT levels below or equal to 80 % defines a positive change in PCT test result representing a higher risk for 28-day all-cause mortality of patients diagnosed with severe sepsis or septic shock.  Change in PCT > 80 %   A decrease of PCT levels of more than 80 % defines a negative change in PCT result representing a lower risk for 28-day all-cause mortality of " "patients diagnosed with severe sepsis or septic shock.                Results may be falsely decreased if patient taking Biotin.     TSH [[id number]]  (Normal) Collected: 09/30/20 0702    Specimen: Blood from Hand, Left Updated: 09/30/20 0805     TSH 0.801 uIU/mL     Basic Metabolic Panel [[id number]]  (Abnormal) Collected: 09/30/20 0702    Specimen: Blood from Hand, Left Updated: 09/30/20 0803     Glucose 235 mg/dL      BUN 16 mg/dL      Creatinine 0.84 mg/dL      Sodium 142 mmol/L      Potassium 4.1 mmol/L      Chloride 108 mmol/L      CO2 21.9 mmol/L      Calcium 8.3 mg/dL      eGFR Non African Amer 64 mL/min/1.73      BUN/Creatinine Ratio 19.0     Anion Gap 12.1 mmol/L     Narrative:      GFR Normal >60  Chronic Kidney Disease <60  Kidney Failure <15      Protime-INR [[id number]]  (Abnormal) Collected: 09/30/20 0702    Specimen: Blood from Hand, Left Updated: 09/30/20 0741     Protime 35.9 Seconds      INR 3.78    Procalcitonin [[id number]]  (Normal) Collected: 09/29/20 2004    Specimen: Blood Updated: 09/30/20 0024     Procalcitonin 0.05 ng/mL     Narrative:      As a Marker for Sepsis (Non-Neonates):   1. <0.5 ng/mL represents a low risk of severe sepsis and/or septic shock.  1. >2 ng/mL represents a high risk of severe sepsis and/or septic shock.    As a Marker for Lower Respiratory Tract Infections that require antibiotic therapy:  PCT on Admission     Antibiotic Therapy             6-12 Hrs later  > 0.5                Strongly Recommended            >0.25 - <0.5         Recommended  0.1 - 0.25           Discouraged                   Remeasure/reassess PCT  <0.1                 Strongly Discouraged          Remeasure/reassess PCT      As 28 day mortality risk marker: \"Change in Procalcitonin Result\" (> 80 % or <=80 %) if Day 0 (or Day 1) and Day 4 values are available. Refer to http://www.[employer]-pct-calculator.com/   Change in PCT <=80 %   A decrease of PCT levels below or equal to 80 % defines a positive change " in PCT test result representing a higher risk for 28-day all-cause mortality of patients diagnosed with severe sepsis or septic shock.  Change in PCT > 80 %   A decrease of PCT levels of more than 80 % defines a negative change in PCT result representing a lower risk for 28-day all-cause mortality of patients diagnosed with severe sepsis or septic shock.                Results may be falsely decreased if patient taking Biotin.     POC Glucose Once [[id number]]  (Normal) Collected: 09/30/20 0006    Specimen: Blood Updated: 09/30/20 0007     Glucose 118 mg/dL     Troponin [[id number]]  (Normal) Collected: 09/29/20 2004    Specimen: Blood Updated: 09/29/20 2041     Troponin T <0.010 ng/mL     Narrative:      Troponin T Reference Range:  <= 0.03 ng/mL-   Negative for AMI  >0.03 ng/mL-     Abnormal for myocardial necrosis.  Clinicians would have to utilize clinical acumen, EKG, Troponin and serial changes to determine if it is an Acute Myocardial Infarction or myocardial injury due to an underlying chronic condition.       Results may be falsely decreased if patient taking Biotin.      Blood Culture - Blood, Arm, Left [[id number]] Collected: 09/29/20 2004    Specimen: Blood from Arm, Left Updated: 09/29/20 2012    CBC & Differential [[id number]]  (Abnormal) Collected: 09/29/20 1755    Specimen: Blood Updated: 09/29/20 1928    Narrative:      The following orders were created for panel order CBC & Differential.  Procedure                               Abnormality         Status                     ---------                               -----------         ------                     CBC Auto Differential[[id number]]        Abnormal            Final result                 Please view results for these tests on the individual orders.    CBC Auto Differential [[id number]]  (Abnormal) Collected: 09/29/20 1755    Specimen: Blood Updated: 09/29/20 1928     WBC 24.44 10*3/mm3      RBC 4.22 10*6/mm3      Hemoglobin 12.7 g/dL       Hematocrit 39.2 %      MCV 92.9 fL      MCH 30.1 pg      MCHC 32.4 g/dL      RDW 12.7 %      RDW-SD 42.6 fl      MPV 10.0 fL      Platelets 158 10*3/mm3     Manual Differential [[id number]]  (Abnormal) Collected: 09/29/20 1755    Specimen: Blood Updated: 09/29/20 1928     Neutrophil % 28.0 %      Lymphocyte % 70.0 %      Monocyte % 1.0 %      Basophil % 1.0 %      Neutrophils Absolute 6.84 10*3/mm3      Lymphocytes Absolute 17.11 10*3/mm3      Monocytes Absolute 0.24 10*3/mm3      Basophils Absolute 0.24 10*3/mm3      RBC Morphology Normal     Smudge Cells Mod/2+     Platelet Morphology Normal    BNP [[id number]]  (Normal) Collected: 09/29/20 1755    Specimen: Blood Updated: 09/29/20 1847     proBNP 535.5 pg/mL     Narrative:      Among patients with dyspnea, NT-proBNP is highly sensitive for the detection of acute congestive heart failure. In addition NT-proBNP of <300 pg/ml effectively rules out acute congestive heart failure with 99% negative predictive value.    Results may be falsely decreased if patient taking Biotin.      Troponin [[id number]]  (Normal) Collected: 09/29/20 1755    Specimen: Blood Updated: 09/29/20 1847     Troponin T <0.010 ng/mL     Narrative:      Troponin T Reference Range:  <= 0.03 ng/mL-   Negative for AMI  >0.03 ng/mL-     Abnormal for myocardial necrosis.  Clinicians would have to utilize clinical acumen, EKG, Troponin and serial changes to determine if it is an Acute Myocardial Infarction or myocardial injury due to an underlying chronic condition.       Results may be falsely decreased if patient taking Biotin.      Comprehensive Metabolic Panel [[id number]]  (Abnormal) Collected: 09/29/20 1755    Specimen: Blood Updated: 09/29/20 1843     Glucose 131 mg/dL      BUN 16 mg/dL      Creatinine 1.00 mg/dL      Sodium 141 mmol/L      Potassium 3.2 mmol/L      Chloride 105 mmol/L      CO2 22.9 mmol/L      Calcium 9.1 mg/dL      Total Protein 6.4 g/dL      Albumin 3.90 g/dL      ALT (SGPT) 23  U/L      AST (SGOT) 17 U/L      Alkaline Phosphatase 105 U/L      Total Bilirubin 0.8 mg/dL      eGFR Non African Amer 53 mL/min/1.73      Globulin 2.5 gm/dL      A/G Ratio 1.6 g/dL      BUN/Creatinine Ratio 16.0     Anion Gap 13.1 mmol/L     Narrative:      GFR Normal >60  Chronic Kidney Disease <60  Kidney Failure <15      Lactic Acid, Plasma [[id number]]  (Normal) Collected: 09/29/20 1755    Specimen: Blood Updated: 09/29/20 1831     Lactate 1.0 mmol/L     D-dimer, Quantitative [[id number]]  (Abnormal) Collected: 09/29/20 1755    Specimen: Blood Updated: 09/29/20 1829     D-Dimer, Quantitative 0.66 MCGFEU/mL     Narrative:      The [employer] D-Dimer test used in conjunction with a clinical pretest probability (PTP) assessment model, has been approved by the FDA to rule out the presence of venous thromboembolism (VTE) in outpatients suspected of deep venous thrombosis (DVT) or pulmonary embolism (PE). The cut-off for negative predictive value is <0.50 MCGFEU/mL.    Protime-INR [[id number]]  (Abnormal) Collected: 09/29/20 1755    Specimen: Blood Updated: 09/29/20 1829     Protime 33.2 Seconds      INR 3.41    Blood Culture - Blood, Arm, Left [[id number]] Collected: 09/29/20 1755    Specimen: Blood from Arm, Left Updated: 09/29/20 1812    Blood Gas, Venous [[id number]]  (Abnormal) Collected: 09/29/20 1801    Specimen: Venous Blood Updated: 09/29/20 1804     Site RV     pH, Venous 7.431 pH Units      pCO2, Venous 34.2 mm Hg      pO2, Venous 40.6 mm Hg      HCO3, Venous 22.7 mmol/L      Base Excess, Venous -1.1 mmol/L      O2 Saturation Calculated 77.8 %      Barometric Pressure for Blood Gas 744.9 mmHg      Modality Room Air     Rate 26 Breaths/minute         Data Review:  Results from last 7 days   Lab Units 10/01/20  0710 09/30/20  0702 09/29/20 1755   SODIUM mmol/L 140 142 141   POTASSIUM mmol/L 3.9 4.1 3.2*   CHLORIDE mmol/L 103 108* 105   CO2 mmol/L 27.4 21.9* 22.9   BUN mg/dL 18 16 16   CREATININE mg/dL 0.90 0.84 1.00     GLUCOSE mg/dL 189* 235* 131*   CALCIUM mg/dL 8.5* 8.3* 9.1     Results from last 7 days   Lab Units 10/01/20  0710 09/29/20  1755   WBC 10*3/mm3 23.36* 24.44*   HEMOGLOBIN g/dL 11.3* 12.7   HEMATOCRIT % 35.1 39.2   PLATELETS 10*3/mm3 145 158     Results from last 7 days   Lab Units 09/30/20  0702   TSH uIU/mL 0.801         Lab Results   Lab Value Date/Time    TROPONINT <0.010 09/29/2020 2004    TROPONINT <0.010 09/29/2020 1755    TROPONINT <0.010 08/29/2020 1036    TROPONINT <0.010 04/28/2020 1421    TROPONINT <0.010 04/23/2020 2143    TROPONINT 0.014 02/11/2020 1748    TROPONINT <0.010 09/16/2019 1207    TROPONINT 0.12 (C) 03/27/2014 0955    TROPONINT 0.14 (C) 03/27/2014 0355    TROPONINT 0.14 (C) 03/26/2014 1755    TROPONINT 0.15 (C) 03/26/2014 0349         Results from last 7 days   Lab Units 10/01/20  0710 09/29/20  1755   ALK PHOS U/L 91 105   BILIRUBIN mg/dL 0.4 0.8   ALT (SGPT) U/L 19 23   AST (SGOT) U/L 16 17     Results from last 7 days   Lab Units 09/30/20  0702   TSH uIU/mL 0.801         Glucose   Date/Time Value Ref Range Status   10/01/2020 1622 130 70 - 130 mg/dL Final   10/01/2020 1155 137 (H) 70 - 130 mg/dL Final   09/30/2020 1557 358 (H) 70 - 130 mg/dL Final   09/30/2020 1158 366 (H) 70 - 130 mg/dL Final   09/30/2020 0006 118 70 - 130 mg/dL Final     Results from last 7 days   Lab Units 10/01/20  0710 09/30/20  0702 09/29/20  1755   INR  3.60* 3.78* 3.41*       Past Medical History:   Diagnosis Date   • Aortic calcification (CMS/HCC)     mild, on echo 12/17/2017   • Aortic regurgitation     Trace   • Asthma    • Atrial fibrillation (CMS/[facility])    • CAD (coronary artery disease)    • Chronic combined systolic and diastolic congestive heart failure (CMS/[facility])    • CKD (chronic kidney disease) stage 3, GFR 30-59 ml/min    • Coronary artery disease involving native coronary artery of native heart with angina pectoris (CMS/[facility])    • Disc degeneration, lumbar    • DM type 2 (diabetes mellitus, type 2)  (CMS/HCC)    • GERD (gastroesophageal reflux disease)    • History of aneurysm     right femoral artery s/p LHC   • History of blood transfusion    • History of fracture    • History of vitamin D deficiency    • Hyperlipidemia    • Hypertension    • Mild mitral regurgitation    • Mitral annular calcification     12/8/2017- echo, moderate   • PAF (paroxysmal atrial fibrillation) (CMS/HCC)    • Peripheral neuropathy    • Skin cancer     Left hand   • Sleep apnea    • SSS (sick sinus syndrome) (CMS/HCC)    • Stroke (cerebrum) (CMS/HCC)    • Tricuspid regurgitation     Trace       Assessment:  Active Hospital Problems    Diagnosis  POA   • **Chronic bronchitis (CMS/[facility]) [J42]  Yes   • Closed wedge compression fracture of T5 vertebra (CMS/[facility]) [S22.050A]  Unknown   • Dyspnea [R06.00]  Yes   • Anticoagulated on Coumadin [Z79.01]  Not Applicable   • CLL (chronic lymphoid leukemia) in relapse (CMS/[facility]) [C91.92]  Yes   • Morbid obesity (CMS/[facility]) [E66.01]  Yes   • HTN (hypertension) [I10]  Yes   • CKD (chronic kidney disease), stage III [N18.30]  Yes   • DM II (diabetes mellitus, type II), controlled (CMS/[facility]) [E11.9]  Yes   • Paroxysmal atrial fibrillation (CMS/[facility]) [I48.0]  Yes      Resolved Hospital Problems   No resolved problems to display.       Plan:  Continue the nebs and O 2, await pulmonary. Follow lab.    Saman Cruz MD  10/1/2020  16:33 EDT

## 2020-10-01 NOTE — DISCHARGE INSTRUCTIONS
No lift, push, pull more than 5 pounds, no swimming, no bending or twisting. No exertional or impact activity- walking is OK. Wear brace when sitting higher than 45 degrees, standing, walking. OK to remove brace for showers- patient should wear brace at least 6 weeks or until back pain resolved

## 2020-10-01 NOTE — CONSULTS
[facility] NEUROSURGERY CONSULT NOTE    Patient name: Caitlyn Longoria  Referring Provider: Dr. Cruz  Reason for Consultation: [facility]    Patient Care Team:  Zenaida Suarez MD as PCP - General (Internal Medicine)  Pao Levi [facility] as Pharmacist  Alexander Valiente [facility] as Pharmacist (Pharmacy)  Zenaida Suarez MD as Referring Physician (Internal Medicine)  Lucien Larkin MD as Consulting Physician (Hematology and Oncology)    Chief complaint: back pain    Subjective .     History of present illness:    Patient is a 86 y.o.  female with history of chronic lymphocytic leukemia diagnosed in 2014.  She states that she saw her oncologist, Dr. Reardon about 2 months ago due to increasing white blood cell count.  She was to start IVIG infusion yesterday with the [facility].    She was admitted with increasing shortness of air and back pain.  She reports her back pain began about 2 weeks ago she describes it as a stabbing pain that occurs between her shoulder blades and is aggravated by coughing.  She states that she had no trauma but that the back pain began after multiple hard coughing spells.  A few days after the onset of pain it began traveling up and down her spine and wrapping around the anterior portion of her chest.  She denies any associated numbness tingling weakness of her legs or abdomen.  Denies any difficulty with bowel or bladder incontinence.  No fever or chills.  She has not taking any medications other than Tylenol but states it does not seem to help much.  She describes her pain as a 5-6 out of 10 in severity.  She uses a walker at baseline.  She has no history of fractures.  She questions a history of osteoporosis.  She admits to congestive heart failure, atrial fibrillation on chronic anticoagulant, chronic asthma, partially paralyzed left lung.  She lives independently.  She states she has been previously told by her pulmonologist that she would be at high risk for any  surgery.    Review of Systems  Review of Systems   Constitutional: Negative for chills and fever.   Respiratory: Positive for cough and shortness of breath.    Genitourinary: Negative for enuresis.   Musculoskeletal: Positive for back pain. Negative for gait problem.   Neurological: Negative for weakness and numbness.       History  PAST MEDICAL HISTORY  Past Medical History:   Diagnosis Date   • Aortic calcification (CMS/HCC)     mild, on echo 12/17/2017   • Aortic regurgitation     Trace   • Asthma    • Atrial fibrillation (CMS/HCC)    • CAD (coronary artery disease)    • Chronic combined systolic and diastolic congestive heart failure (CMS/HCC)    • CKD (chronic kidney disease) stage 3, GFR 30-59 ml/min    • Coronary artery disease involving native coronary artery of native heart with angina pectoris (CMS/HCC)    • Disc degeneration, lumbar    • DM type 2 (diabetes mellitus, type 2) (CMS/HCC)    • GERD (gastroesophageal reflux disease)    • History of aneurysm     right femoral artery s/p LHC   • History of blood transfusion    • History of fracture    • History of vitamin D deficiency    • Hyperlipidemia    • Hypertension    • Mild mitral regurgitation    • Mitral annular calcification     12/8/2017- echo, moderate   • PAF (paroxysmal atrial fibrillation) (CMS/HCC)    • Peripheral neuropathy    • Skin cancer     Left hand   • Sleep apnea    • SSS (sick sinus syndrome) (CMS/HCC)    • Stroke (cerebrum) (CMS/HCC)    • Tricuspid regurgitation     Trace       PAST SURGICAL HISTORY  Past Surgical History:   Procedure Laterality Date   • BRONCHOSCOPY Bilateral 10/6/2020    Procedure: BRONCHOSCOPY with BILATERAL LUNG washings;  Surgeon: Juno Coburn MD;  Location: [facility] ENDOSCOPY;  Service: Pulmonary;  Laterality: Bilateral;  PRE: purulent bronchitis  POST: PURULENT BRONCHITIS   • BRONCHOSCOPY Bilateral 10/9/2020    Procedure: BRONCHOSCOPY with washing;  Surgeon: Juno Coburn MD;  Location: [facility] ENDOSCOPY;   Service: Pulmonary;  Laterality: Bilateral;  pre/post - mucous plug     • CARDIAC CATHETERIZATION     • CARDIAC ELECTROPHYSIOLOGY PROCEDURE N/A 2/7/2020    Procedure: PPM generator change - dual  medtronic;  Surgeon: Kiel Field MD;  Location: [facility] INVASIVE LOCATION;  Service: Cardiology;  Laterality: N/A;   • CHOLECYSTECTOMY     • CORONARY STENT PLACEMENT     • HERNIA REPAIR      hital hernia   • HYSTERECTOMY     • PACEMAKER IMPLANTATION     • REPLACEMENT TOTAL KNEE Bilateral        FAMILY HISTORY  Family History   Problem Relation Age of Onset   • Heart disease Mother    • Hypertension Mother    • Colon polyps Mother    • Heart disease Father    • Hypertension Father    • Stroke Father    • Hypertension Brother    • Heart disease Brother    • Diabetes Niece    • Colon cancer Sister    • Hypertension Sister    • Hypertension Daughter    • Hypertension Son    • Hypertension Maternal Aunt    • Hypertension Maternal Grandmother    • Hypertension Maternal Grandfather    • Hypertension Paternal Grandmother    • Hypertension Paternal Grandfather        SOCIAL HISTORY  Social History     Tobacco Use   • Smoking status: Never Smoker   • Smokeless tobacco: Never Used   • Tobacco comment: caffeine use- soda   Substance Use Topics   • Alcohol use: Not Currently   • Drug use: No     single  retired  Lives in independent living at [place]    Allergies:  Accupril [quinapril hcl], Ahist [chlorpheniramine], Clarithromycin, Esomeprazole, Levalbuterol, Levocetirizine, Lipitor [atorvastatin], Omeprazole, Pravachol [pravastatin], Sulindac, Valdecoxib, Chlorcyclizine, Diclofenac sodium, Diphenhydramine, Lodine [etodolac], and Sulfa antibiotics    MEDICATIONS:  No medications prior to admission.     No current facility-administered medications for this encounter.     Current Outpatient Medications:   •  albuterol (PROVENTIL) (2.5 MG/3ML) 0.083% nebulizer solution, Take 2.5 mg by nebulization Every 4 (Four) Hours.,  Disp: , Rfl:   •  albuterol sulfate  (90 Base) MCG/ACT inhaler, Inhale 2 puffs Every 4 (Four) Hours As Needed for Wheezing., Disp: 1 inhaler, Rfl: 3  •  aspirin 81 MG tablet, Take 81 mg by mouth Daily., Disp: , Rfl:   •  Benralizumab (FASENRA SC), Inject  under the skin into the appropriate area as directed. Gets one shot a month, next shot may 13 then one every 8 weeks, Disp: , Rfl:   •  Calcium Carbonate-Vitamin D (CALCIUM 500 + D PO), Take  by mouth 2 (two) times a day., Disp: , Rfl:   •  carvedilol (COREG) 3.125 MG tablet, TAKE ONE TABLET BY MOUTH TWICE A DAY, Disp: 180 tablet, Rfl: 1  •  cephalexin (KEFLEX) 500 MG capsule, Take 500 mg by mouth 3 (Three) Times a Day. 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 She had RX filled at [facility] on 9/29/20 for #270 with refills., Disp: , Rfl:   •  fluticasone (FLONASE) 50 MCG/ACT nasal spray, 1 spray into the nostril(s) as directed by provider Daily., Disp: , Rfl:   •  Fluticasone-Umeclidin-Vilant (Trelegy Ellipta) 100-62.5-25 MCG/INH aerosol powder , Inhale 1 puff Daily. As directed , Disp: , Rfl:   •  furosemide (LASIX) 20 MG tablet, Take 20 mg by mouth Every Morning., Disp: , Rfl:   •  glipiZIDE (GLUCOTROL) 5 MG tablet, Take 2.5 mg by mouth Every Morning., Disp: , Rfl:   •  losartan (COZAAR) 25 MG tablet, Take 1 tablet by mouth Daily., Disp: 30 tablet, Rfl: 11  •  montelukast (SINGULAIR) 10 MG tablet, Take 10 mg by mouth Every Night., Disp: , Rfl:   •  Omega-3 Fatty Acids (FISH OIL) 1000 MG capsule capsule, Take 1,000 mg by mouth 2 (Two) Times a Day With Meals., Disp: , Rfl:   •  oxazepam (SERAX) 10 MG capsule, Take 1 capsule by mouth Every Night., Disp: 5 capsule, Rfl: 0  •  rosuvastatin (CRESTOR) 20 MG tablet, Take 20 mg by mouth Every Night., Disp: , Rfl:   •  acetaminophen (TYLENOL) 325 MG tablet, Take 2 tablets by mouth Every 4 (Four) Hours As Needed for Mild Pain ., Disp:  , Rfl:   •  acetylcysteine (MUCOMYST) 20 % nebulizer solution, Take 2 mL by  nebulization 4 (Four) Times a Day., Disp:  , Rfl:   •  guaiFENesin (MUCINEX) 600 MG 12 hr tablet, Take 1 tablet by mouth Every 12 (Twelve) Hours., Disp:  , Rfl:   •  oxybutynin XL (DITROPAN-XL) 10 MG 24 hr tablet, Take 10 mg by mouth every night at bedtime., Disp: , Rfl:   •  pantoprazole (PROTONIX) 40 MG EC tablet, Take 40 mg by mouth Daily., Disp: , Rfl:   •  polyethyl glycol-propyl glycol (LUBRICATING EYE DROPS) 0.4-0.3 % solution ophthalmic solution, Administer 1 drop to both eyes Every 1 (One) Hour As Needed., Disp: , Rfl:   •  sodium chloride 0.65 % nasal spray, 1 spray into the nostril(s) as directed by provider As Needed for Congestion., Disp:  , Rfl: 12  •  tobramycin PF (Dick) 300 MG/5ML nebulizer solution, Take 5 mL by nebulization 2 (Two) Times a Day for 30 days. Indications: Pneumonia, Disp: 300 mL, Rfl: 0  •  warfarin (COUMADIN) 3 MG tablet, Take 1 tablet by mouth Every Night. Indications: Atrial Fibrillation, Disp: , Rfl:     Objective     Results Review:  LABS:            Invalid input(s): LABALBU, PROT  COVID- neg    DIAGNOSTICS:  Thoracic x-rays 9/30 reveal anterior wedge compression deformity at T5, T7, T11.  Extensive bony demineralization seen.    CTA chest 9/29 compared with 9/16 2019 reveals new T5 wedge compression deformities with no breach of posterior cortex or retropulsion.  The T7 and NT 11 findings are unchanged as compared to the prior study 1 year ago.    Results Review:   I reviewed the patient's new clinical results.  I personally viewed and interpreted the patient's thoracic x-rays and CTA for spine findings    Vital Signs        Physical Exam:  Physical Exam  Constitutional:       Appearance: She is obese.   Pulmonary:      Effort: Tachypnea present.      Comments: Nasal cannula O2 at 2 L  Musculoskeletal:         General: No tenderness.      Cervical back: She exhibits normal range of motion, no tenderness, no bony tenderness and no pain.      Thoracic back: She exhibits bony  tenderness ( Mid and lower thoracic). She exhibits no pain.      Lumbar back: She exhibits no tenderness and no bony tenderness.      Right lower leg: Edema present.      Left lower leg: Edema present.      Comments: Bilateral foot pronation right greater than left   Skin:     General: Skin is dry.      Comments: Bilateral knee scars   Neurological:      Mental Status: She is alert and oriented to person, place, and time.      Deep Tendon Reflexes:      Reflex Scores:       Patellar reflexes are 0 on the right side and 0 on the left side.  Psychiatric:         Mood and Affect: Mood normal.         Speech: Speech normal.         Thought Content: Thought content normal.         Judgment: Judgment normal.       Neurologic Exam     Mental Status   Oriented to person, place, and time.   Attention: normal. Concentration: normal.   Speech: speech is normal   Level of consciousness: alert  Knowledge: good.   Normal comprehension.     Motor Exam   Muscle bulk: normal  5/5 bilateral lower extremities     Sensory Exam   Right leg light touch: normal  Left leg light touch: normal    Normal abdominal sensation     Gait, Coordination, and Reflexes     Gait  Gait: (Assist x1 per nursing; uses walker at baseline)    Reflexes   Right patellar: 0  Left patellar: 0  Right plantar: normal  Left plantar: normal  Right You: absent  Left You: absent  Right ankle clonus: absent  Left ankle clonus: absent      Assessment/Plan       Chronic bronchitis (CMS/HCC)    DM II (diabetes mellitus, type II), controlled (CMS/HCC)    Paroxysmal atrial fibrillation (CMS/HCC)    HTN (hypertension)    CKD (chronic kidney disease), stage III    Morbid obesity (CMS/HCC)    Rhinovirus    CLL (chronic lymphoid leukemia) in relapse (CMS/HCC)    Dyspnea    Anticoagulated on Coumadin    Osteoporotic compression fracture of spine (CMS/HCC)    Pneumonia due to gram-negative bacteria (CMS/HCC)    Pneumonia due to infectious organism      PLAN: Patient with  "2 weeks of parascapular back pain wrapping around the anterior chest.  Began after coughing episode.  No leg symptoms or gait issues.  Has not taken any narcotics for her pain.  Has taken Tylenol intermittently but not consistently.  States it does not seem to help much.  She is on oxygen, reports a history of congestive heart failure, chronic anticoagulation for atrial fibrillation.  She is not interested in surgical intervention and I concur with her multiple medical issues..  She is tender to palpation appropriately in the mid thoracic spine but also in the lower thoracic spine.  Imaging shows suspected acute T5 wedge compression deformity but other thoracic changes appear chronic.  She has no weakness and is ambulatory.  She has no fever or chills.  Elevation of white blood cell count likely secondary to CLL.  Procalcitonin normal.  Discussed conservative measures including bracing and pain management.  Patient agreeable to this.  Advised her that we will be available to her if she desires further work-up, but I see no need in obtaining MRI at this time with no radicular symptoms and no interest in surgery.  Recommend primary service consider giving low-dose narcotic to see if this will alleviate her pain in the short-term.  I have advised her to wear the brace for at least 6 weeks and until pain has significantly improved/resolved, which ever is longer.  She is to avoid lifting more than 15 to 20 pounds indefinitely.  She asked if it is okay to resume yoga.  This is fine once her pain has resolved.    An OTS TLSO brace has been ordered due to diagnosis of T5 VCF.  The purpose of the brace is to support weak muscles, stabilize and restrict movement of the trunk to aid in healing and pain relief of (fracture.          I discussed the patient's findings and my recommendations with patient and Dr. South Thompson, APRN  10/25/20  12:14 EDT    \"Dictated utilizing Dragon dictation\".      "

## 2020-10-01 NOTE — DISCHARGE PLACEMENT REQUEST
"Caitlyn Longoria (85 y.o. Female)     Date of Birth Social Security Number Address Home Phone MRN    [id number]  [id number]  [address]  [place] [address] [phone] [id number]    Mandaen Marital Status          [employer]ar[place] Single       Admission Date Admission Type Admitting Provider Attending Provider Department, Room/Bed    9/29/20 [address] Yoo MD Beard, Lyle E, MD [address], [address]/1    Discharge Date Discharge Disposition Discharge Destination                       Attending Provider: Saman Cruz MD    Allergies: Accupril [Quinapril Hcl], Ahist [Chlorpheniramine], Clarithromycin, Esomeprazole, Levalbuterol, Levocetirizine, Lipitor [Atorvastatin], Omeprazole, Pravachol [Pravastatin], Sulindac, Valdecoxib, Chlorcyclizine, Diclofenac Sodium, Diphenhydramine, Lodine [Etodolac], Sulfa Antibiotics    Isolation: None   Infection: Rhinovirus  (10/01/20)   Code Status: CPR    Ht: 157.5 cm (62\")   Wt: 95.7 kg (211 lb)    Admission Cmt: None   Principal Problem: Chronic bronchitis (CMS/HCC) [J42]                 Active Insurance as of 9/29/2020     Primary Coverage     Payor Plan Insurance Group Employer/Plan Group    MEDICARE MEDICARE A & B      Payor Plan Address Payor Plan Phone Number Payor Plan Fax Number Effective Dates    [address] [phone]  10/1/1999 - None Entered    [facility] [id number]       Subscriber Name Subscriber Birth Date Member ID       CAITLYN LONGORIA 1934 [id number]           Secondary Coverage     Payor Plan Insurance Group Employer/Plan Group    [facility] HEALTH CARE OPTIONS PLAN F     Payor Plan Address Payor Plan Phone Number Payor Plan Fax Number Effective Dates    [facility] [phone]  1/1/2015 - None Entered    [address]       [facility] [id number]       Subscriber Name Subscriber Birth Date Member ID       CAITLYN LONGORIA 1934 [id number]                 Emergency Contacts      " (Rel.) Home Phone Work Phone Mobile Phone    Inez Longoria (Relative) [phone] -- [phone]    Trae Longoria (Brother) [phone] -- [phone]    Zoey Adair (Relative) [phone] -- [phone]

## 2020-10-01 NOTE — PROGRESS NOTES
Subjective     REASON FOR CONSULTATION: Chronic lymphocytic leukemia  Provide an opinion on any further workup or treatment                             REQUESTING PHYSICIAN:    RECORDS OBTAINED:  Records of the patients history including those obtained from the referring provider were reviewed and summarized in detail.    HISTORY OF PRESENT ILLNESS:  The patient is a 85 y.o. year old female who is here for an opinion about the above issue.    Shortness of Breath  Pertinent negatives include no abdominal pain, chest pain, fever, headaches, leg swelling, rash, sore throat, vomiting or wheezing.      Patient is an 85-year-old female whom I had seen in 2014 when she was hospitalized at [facility] and was diagnosed with chronic lymphocytic leukemia.  She has not been to see me in over 4 years and is following with Dr. Suarez her primary care doctor and her white count is gone up a little higher than usual to 22,000 and she is referred back to us for evaluation.  We are doing a telephone visit because of the coronavirus pandemic and her age and susceptibility.     When I originally saw her in 2014 she was brought into the ER unresponsive in septic shock on 03/25/2014 with methicillin-resistant staphylococcus identified in her blood cultures. The patient has been on antibiotic with improvement, but had some residual kidney damage with a creatinine in the 4-range requiring hemodialysis, and was noted on admission to have an elevated white count with lymphocytosis, normal hemoglobin, but a platelet count of 95,000, and over the course of the illness her platelet count normalized and the white count had gone up into the 20,000 range with lymphocytosis. A flow cytometry was sent which is consistent with CLL, she came to our office once or twice and then stopped coming because she was so stable      She tells me now she was hospitalized recently with rhinovirus infection and has had diagnosed with asthma and is having a lot of  respiratory issues but does not require oxygen.She is at the [facility] in independent living and managing fairly well     She denies fever sweats or weight loss.  Her last white count was on 5/26/2020  Showed a white count of 18,000 with 66 lymphs and 27 neutrophils platelet white hemoglobin is 12.6 platelet 188,000     I reassured Christopher that no treatment is indicated for this level of leukocytosis from CLL and if she has no systemic complaints I do not feel strongly about doing CAT scans at her age but I would like to physically examine her in the office in a couple of months to make sure there is no obvious adenopathy or hepatosplenomegaly.     She does have recurrent infections and we can check quantitative immunoglobulins to see how low they are as another adjunctive option to prevent recurrent infections if she has hypogammaglobulinemia     She remains on Coumadin for atrial fibrillation    Patient was recently discharged from the hospital August 31, 2020.  She presented with shortness of breath at that time.  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  Patient was seen by Dr. Metcalf and felt that outpatient IVIG could be done.  At the time of discharge patient's oxygenation type improved significantly.  Her hemoglobin was 12.8, white count of 18.62 and a platelet count of 210.    Patient got admitted with shortness of breath.  She also had elevated INR at 3.41.  Given that her white count is elevated and she had a cough they cultured her and they were concerned about pneumonia.  She received ceftriaxone and doxycycline in the emergency room.  COVID testing was done and it is pending.    CT angiogram of the chest showed     IMPRESSION:  No evidence of pulmonary thromboembolism. There is a new  infiltrate and atelectasis in the posterior right lower lobe where there  are some secretions opacifying several peripheral bronchioles. No other  acute appearing abnormality is present.     Patient is thought to have chronic bronchitis    Pulmonary consult has been obtained.  Currently is on ceftriaxone    Past Medical History:   Diagnosis Date   • Aortic calcification (CMS/HCC)     mild, on echo 12/17/2017   • Aortic regurgitation     Trace   • Asthma    • Atrial fibrillation (CMS/HCC)    • CAD (coronary artery disease)    • Chronic combined systolic and diastolic congestive heart failure (CMS/HCC)    • CKD (chronic kidney disease) stage 3, GFR 30-59 ml/min    • Coronary artery disease involving native coronary artery of native heart with angina pectoris (CMS/HCC)    • Disc degeneration, lumbar    • DM type 2 (diabetes mellitus, type 2) (CMS/HCC)    • GERD (gastroesophageal reflux disease)    • History of aneurysm     right femoral artery s/p LHC   • History of blood transfusion    • History of fracture    • History of vitamin D deficiency    • Hyperlipidemia    • Hypertension    • Mild mitral regurgitation    • Mitral annular calcification     12/8/2017- echo, moderate   • PAF (paroxysmal atrial fibrillation) (CMS/HCC)    • Peripheral neuropathy    • Skin cancer     Left hand   • Sleep apnea    • SSS (sick sinus syndrome) (CMS/HCC)    • Stroke (cerebrum) (CMS/HCC)    • Tricuspid regurgitation     Trace        Past Surgical History:   Procedure Laterality Date   • CARDIAC CATHETERIZATION     • CARDIAC ELECTROPHYSIOLOGY PROCEDURE N/A 2/7/2020    Procedure: PPM generator change - dual  medtronic;  Surgeon: Kiel Field MD;  Location: [facility] INVASIVE LOCATION;  Service: Cardiology;  Laterality: N/A;   • CHOLECYSTECTOMY     • CORONARY STENT PLACEMENT     • HERNIA REPAIR      hital hernia   • HYSTERECTOMY     • PACEMAKER IMPLANTATION     • REPLACEMENT TOTAL KNEE Bilateral         No current facility-administered medications on file prior to encounter.      Current Outpatient Medications on File Prior to Encounter   Medication Sig Dispense Refill   • acetaminophen (TYLENOL) 500 MG tablet  Take 1,000 mg by mouth 2 (two) times a day.     • Acetylcysteine 500 MG capsule Take 1,000 mg by mouth Daily With Breakfast & Dinner.     • albuterol (PROVENTIL) (2.5 MG/3ML) 0.083% nebulizer solution Take 2.5 mg by nebulization Every 4 (Four) Hours.     • albuterol sulfate  (90 Base) MCG/ACT inhaler Inhale 2 puffs Every 4 (Four) Hours As Needed for Wheezing. 1 inhaler 3   • aspirin 81 MG tablet Take 81 mg by mouth Daily.     • Benralizumab (FASENRA SC) Inject  under the skin into the appropriate area as directed. Gets one shot a month, next shot may 13 then one every 8 weeks     • Calcium Carbonate-Vitamin D (CALCIUM 500 + D PO) Take  by mouth 2 (two) times a day.     • carvedilol (COREG) 3.125 MG tablet TAKE ONE TABLET BY MOUTH TWICE A  tablet 1   • cephalexin (KEFLEX) 500 MG capsule Take 500 mg by mouth 3 (Three) Times a Day. 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 She had RX filled at [facility] on 9/29/20 for #270 with refills.     • cetirizine (zyrTEC) 10 MG tablet Take 10 mg by mouth 2 (Two) Times a Day. Patient's provider - YASIR Dave     • fluticasone (FLONASE) 50 MCG/ACT nasal spray 1 spray into the nostril(s) as directed by provider Daily.     • Fluticasone-Umeclidin-Vilant (Trelegy Ellipta) 100-62.5-25 MCG/INH aerosol powder  Inhale 1 puff Daily. As directed      • furosemide (LASIX) 20 MG tablet Take 20 mg by mouth Every Morning.     • glipiZIDE (GLUCOTROL) 5 MG tablet Take 2.5 mg by mouth Every Morning.     • loperamide (IMODIUM) 2 MG capsule Take 2 mg by mouth Daily. After bowel movement.     • losartan (COZAAR) 25 MG tablet Take 1 tablet by mouth Daily. 30 tablet 11   • montelukast (SINGULAIR) 10 MG tablet Take 10 mg by mouth Every Night.     • Omega-3 Fatty Acids (FISH OIL) 1000 MG capsule capsule Take 1,000 mg by mouth 2 (Two) Times a Day With Meals.     • oxazepam (SERAX) 10 MG capsule Take 1 capsule by mouth Every Night. 5 capsule 0   • rosuvastatin (CRESTOR) 20 MG tablet Take 20  mg by mouth Every Night.     • sodium chloride 7 % nebulizer solution nebulizer solution Take 4 mL by nebulization 2 (two) times a day.     • warfarin (COUMADIN) 4 MG tablet Take one-half tablet (2 mg) by mouth Tues, Thurs, and Sat, and take one tablet (4 mg) by mouth all other days or as directed. (Patient taking differently: Take one-half tablet (2 mg) by mouth all other day, and take one tablet (4 mg) by mouth Mondays and friday.) 75 tablet 1   • ketoconazole (NIZORAL) 2 % cream Apply  topically to the appropriate area as directed Daily.     • Menthol, Topical Analgesic, (BIOFREEZE ROLL-ON EX) Apply  topically.     • oxybutynin XL (DITROPAN-XL) 10 MG 24 hr tablet Take 10 mg by mouth every night at bedtime.     • pantoprazole (PROTONIX) 40 MG EC tablet Take 40 mg by mouth Daily.     • polyethyl glycol-propyl glycol (LUBRICATING EYE DROPS) 0.4-0.3 % solution ophthalmic solution Administer 1 drop to both eyes Every 1 (One) Hour As Needed.          ALLERGIES:    Allergies   Allergen Reactions   • Accupril [Quinapril Hcl] Swelling, Other (See Comments), GI Intolerance and Delirium     HA, constipation    • Ahist [Chlorpheniramine] Nausea Only, Other (See Comments) and Dizziness     Headache, Blurred vision   • Clarithromycin Nausea Only, Other (See Comments) and Mental Status Change     HA, Depression, Flushing   • Esomeprazole GI Intolerance   • Levalbuterol Swelling   • Levocetirizine Diarrhea and GI Intolerance   • Lipitor [Atorvastatin] Other (See Comments) and Myalgia     Dark urine   • Omeprazole Nausea Only and Other (See Comments)     HA   • Pravachol [Pravastatin] Nausea Only and GI Intolerance     Bloated, Constipation, HA   • Sulindac Other (See Comments) and Myalgia     HA, joint pain, bruising   • Valdecoxib Irritability   • Chlorcyclizine Unknown - Low Severity   • Diclofenac Sodium Unknown - Low Severity   • Diphenhydramine Unknown - Low Severity   • Lodine [Etodolac] Unknown - Low Severity   • Sulfa  Antibiotics Unknown - Low Severity        Social History     Socioeconomic History   • Marital status: Single     Spouse name: Not on file   • Number of children: Not on file   • Years of education: Not on file   • Highest education level: Not on file   Occupational History     Employer: RETIRED   Tobacco Use   • Smoking status: Never Smoker   • Smokeless tobacco: Never Used   • Tobacco comment: caffeine use- soda   Substance and Sexual Activity   • Alcohol use: Not Currently   • Drug use: No   • Sexual activity: Defer   Lifestyle   • Physical activity     Days per week: 0 days     Minutes per session: 0 min   • Stress: Only a little        Family History   Problem Relation Age of Onset   • Heart disease Mother    • Hypertension Mother    • Colon polyps Mother    • Heart disease Father    • Hypertension Father    • Stroke Father    • Hypertension Brother    • Heart disease Brother    • Diabetes Niece    • Colon cancer Sister    • Hypertension Sister    • Hypertension Daughter    • Hypertension Son    • Hypertension Maternal Aunt    • Hypertension Maternal Grandmother    • Hypertension Maternal Grandfather    • Hypertension Paternal Grandmother    • Hypertension Paternal Grandfather         Review of Systems   Constitutional: Negative for appetite change, chills, diaphoresis, fatigue, fever and unexpected weight change.   HENT: Negative for hearing loss, sore throat and trouble swallowing.    Respiratory: Positive for cough and shortness of breath. Negative for chest tightness and wheezing.    Cardiovascular: Negative for chest pain, palpitations and leg swelling.   Gastrointestinal: Negative for abdominal distention, abdominal pain, constipation, diarrhea, nausea and vomiting.   Genitourinary: Negative for dysuria, frequency, hematuria and urgency.   Musculoskeletal: Negative for joint swelling.        No muscle weakness.   Skin: Negative for rash and wound.   Neurological: Negative for seizures, syncope, speech  difficulty, weakness, numbness and headaches.   Hematological: Negative for adenopathy. Does not bruise/bleed easily.   Psychiatric/Behavioral: Negative for behavioral problems, confusion and suicidal ideas.      I have reviewed and confirmed the accuracy of the ROS as documented by the MA/LPN/RN Deja Moran MD      Objective     Vitals:    10/01/20 0133 10/01/20 0826 10/01/20 1100 10/01/20 1239   BP: 125/89  106/77    BP Location: Left arm  Right arm    Patient Position: Lying  Sitting    Pulse: 99 81 79 79   Resp: 24 20 22 20   Temp: 97.8 °F (36.6 °C)  98 °F (36.7 °C)    TempSrc: Oral  Oral    SpO2: 94% 98% 95% 96%   Weight:       Height:         Current Status 8/14/2020   ECOG score 1       Physical Exam  Vitals signs and nursing note reviewed.   Constitutional:       Appearance: Normal appearance. She is well-developed.   HENT:      Head: Normocephalic and atraumatic.      Mouth/Throat:      Dentition: Normal dentition.   Eyes:      General: Lids are normal.      Pupils: Pupils are equal, round, and reactive to light.   Neck:      Musculoskeletal: Normal range of motion and neck supple.      Thyroid: No thyroid mass or thyromegaly.      Trachea: Trachea normal. No tracheal deviation.   Cardiovascular:      Rate and Rhythm: Normal rate and regular rhythm.      Heart sounds: No murmur. No friction rub. No gallop.    Pulmonary:      Effort: Pulmonary effort is normal. No respiratory distress.      Breath sounds: Normal breath sounds.   Abdominal:      General: Bowel sounds are normal. There is no distension.      Palpations: Abdomen is soft. There is no mass.      Tenderness: There is no abdominal tenderness. There is no guarding or rebound.      Hernia: No hernia is present.   Musculoskeletal: Normal range of motion.      Comments:  Normal gait and station.  FROMx4.  No digital cyanosis.   Lymphadenopathy:      Cervical: No cervical adenopathy.   Skin:     General: Skin is warm and dry.      Findings: No  lesion or rash.   Neurological:      Mental Status: She is alert and oriented to person, place, and time.   Psychiatric:         Thought Content: Thought content normal.         I have reexamined the patient and the results are consistent with the previously documented exam. Deja Moran MD     RECENT LABS:  Hematology WBC   Date Value Ref Range Status   10/01/2020 23.36 (H) 3.40 - 10.80 10*3/mm3 Final     RBC   Date Value Ref Range Status   10/01/2020 3.73 (L) 3.77 - 5.28 10*6/mm3 Final     Hemoglobin   Date Value Ref Range Status   10/01/2020 11.3 (L) 12.0 - 15.9 g/dL Final     Hematocrit   Date Value Ref Range Status   10/01/2020 35.1 34.0 - 46.6 % Final     Platelets   Date Value Ref Range Status   10/01/2020 145 140 - 450 10*3/mm3 Final          Assessment/Plan     1. Chronic lymphocytic leukemia  -based on her hemoglobin   and a normal platelet count, no treatment indication exists.  · Currently her white count is 24K.  · She is admitted with bronchitis and is currently on ceftriaxone  · Stable white count, slightly elevated secondary to underlying bronchitis     2. We will check quantitative immunoglobulins to see if IVIG replacement may help with recurrent infections  · Discussed with pharmacy about consideration of giving IVIG inpatient but given shortage we will hold off  · Discussed with patient that we will give IVIG as outpatient    3.  Questionable pneumonia versus bronchitis,  · Patient's CT has been reviewed by me.  With a small infiltrate in the lung  · Shortness of breath has worsened  · Await pulmonary input  · Infectious disease felt the infiltrate in the lung is not impressive and they felt she needs to be on suppressive Keflex.  · Respiratory viral panel pending    Plan    1. Will obtain quantitative immunoglobulins  2. Agree with antibiotics  3. If patient has recurrent infections will benefit from IVIG, last IVIG is to be scheduled as of today.  Unsure if he can get it while in the  \[facility]\"".  Pharmacy states that is not possible to get IVIG inpatient unless it is a dire emergency  4. We will follow    Deja Moran MD

## 2020-10-01 NOTE — NURSING NOTE
Dyspnea noted with exertion. Pt had increased work of breathing with transferring from bsc to chair. Deep breathing and cough encouraged. Will ctm

## 2020-10-01 NOTE — PROGRESS NOTES
Discharge Planning Assessment  [facility]     Patient Name: Caitlyn Longoria  MRN: [id number]  Today's Date: 10/1/2020    Admit Date: 9/29/2020    Discharge Needs Assessment     Row Name 10/01/20 1724       Living Environment    Lives With  facility resident    Current Living Arrangements  independent/assisted living facility [facility]    Primary Care Provided by  self    Provides Primary Care For  no one    Family Caregiver if Needed  sibling(s);other relative(s)    Family Caregiver Names  brother and sister in law, Trae and Inez Longoria, [phone]; niece, Ana Adair, [phone]    Quality of Family Relationships  helpful;involved;supportive    Able to Return to Prior Arrangements  yes       Resource/Environmental Concerns    Resource/Environmental Concerns  none       Transition Planning    Patient/Family Anticipates Transition to  home with help/services    Patient/Family Anticipated Services at Transition  home health care    Transportation Anticipated  family or friend will provide       Discharge Needs Assessment    Current Outpatient/Agency/Support Group  homecare agency    Equipment Currently Used at Home  nebulizer;bath bench;walker, rolling    Concerns to be Addressed  discharge planning    Outpatient/Agency/Support Group Needs  homecare agency    Discharge Facility/Level of Care Needs  home with home health    Discharge Coordination/Progress  Return to [place] with [facility]        Discharge Plan     Row Name 10/01/20 1726       Plan    Plan  Return to [facility], referral to [facility]    Provided Post Acute Provider List?  Refused    Refused Provider List Comment  Prefers [facility] which she has used in the past    Patient/Family in Agreement with Plan  yes    Plan Comments  IMM letter checked. CCP spoke with pt to verify information to verify information and discuss d/c planning. Pt resides in independent living at [place] and uses walker, nebulizer, and bath bench. Pt has used [facility]  in the past (and requests new referral, placed) and has been to [facility] for past skilled rehab. Pt uses [facility], but agrees to [facility] enrollment (updated in EPIC). Pt currently requiring 2L O2. CCP to follow for additional needs. Pt states her niece (Ana Adair, [phone]) will transport her home at d/c. Jodee Flynn LCSW        Continued Care and Services - Admitted Since 9/29/2020     Destination     Service Provider Request Status Selected Services Address Phone Fax    [facility]  Pending - Request Sent N/A [id number] [address] [phone]          Home Medical Care     Service Provider Request Status Selected Services Address Phone Fax    [facility]  Pending - Request Sent N/A [id number] [address] [phone]                Demographic Summary     Row Name 10/01/20 1724       General Information    Admission Type  inpatient    Arrived From  home    Required Notices Provided  Important Message from Medicare    Referral Source  admission list    Reason for Consult  discharge planning    Preferred Language  English        Functional Status     Row Name 10/01/20 1724       Functional Status    Usual Activity Tolerance  moderate    Current Activity Tolerance  moderate       Functional Status, IADL    Medications  assistive equipment and person    Meal Preparation  assistive equipment and person    Housekeeping  assistive equipment and person    Laundry  assistive equipment and person    Shopping  assistive equipment and person       Mental Status Summary    Recent Changes in Mental Status/Cognitive Functioning  no changes        Psychosocial    No documentation.       Abuse/Neglect    No documentation.       Legal    No documentation.       Substance Abuse    No documentation.       Patient Forms    No documentation.           Radha Flynn LCSW

## 2020-10-01 NOTE — CONSULTS
Referring Provider: Rd Yoo MD  [facility] [address] [address]  Reason for Consultation: Pneumonia    Subjective   History of present illness: This is an 85-year-old male with CLL, atrial fibrillation, coronary artery disease, stage III chronic kidney disease, type 2 diabetes, hypertension and stroke who was admitted on September 29th with increasing shortness of breath.  Patient is well-known to the infectious disease service.  She was last seen by Dr. Miller in the infectious disease clinic in March.  She has a history of right prosthetic knee septic arthritis and was treated with a 6-week course of antibiotics in February 2018 and has been on suppressive therapy since that time.  She is currently on cephalexin 500 mg p.o. every 8 hours.  She was admitted to the hospital in April of this year with increasing shortness of breath and cough.  She was diagnosed with rhinovirus enterovirus infection.  She was readmitted in May of this year once again with increasing shortness of breath.  She was diagnosed with an acute asthma/COPD exacerbation.  She she was last hospitalized about [facility] at the end of August.  She once again tested positive for rhinovirus/enterovirus infection and presented with acute asthma/COPD exacerbation.  During that hospital admission she was also found to have low immunoglobulin levels.  The patient states that about 10-day goes ago she developed cold-like symptoms with increasing shortness of breath and cough.  She was prescribed amoxicillin and feels like the antibiotic helped her.  Unfortunately a few days prior to admission she started experiencing back pain that radiated down her spine and then to her anterior chest.  As she felt like her cough had improved the day prior to admission she felt like is starting to worsen again with increasing sputum production.  She is denying any fevers chills or night sweats.     Admission labs revealed a white  blood cell count of 23,000 with lymphocytic predominance.  CT of the chest showed right-sided infiltrate versus atelectasis.  Procalcitonin is negative.  She has been continued on her suppressive Keflex.      Past Medical History:   Diagnosis Date   • Atrial fibrillation (CMS/[facility])    • CAD (coronary artery disease)    • Chronic combined systolic and diastolic congestive heart failure (CMS/[facility])    • CKD (chronic kidney disease) stage 3, GFR 30-59 ml/min (CMS/[facility])    • DM type 2 (diabetes mellitus, type 2) (CMS/[facility])    • GERD (gastroesophageal reflux disease)    • Hyperlipidemia    • Hypertension    • PAF (paroxysmal atrial fibrillation) (CMS/[facility])    • Peripheral neuropathy    • Sleep apnea    • SSS (sick sinus syndrome) (CMS/[facility])    • Stroke (cerebrum) (CMS/[facility])    Right prosthetic knee septic arthritis  Restrictive lung disease  CLL   Renal globulin deficiency    Past Surgical History:   Procedure Laterality Date   • CHOLECYSTECTOMY     • CORONARY STENT PLACEMENT     • HERNIA REPAIR      hital hernia   • HYSTERECTOMY     • PACEMAKER IMPLANTATION     • REPLACEMENT TOTAL KNEE Bilateral         reports that she has never smoked. She has never used smokeless tobacco. She reports previous alcohol use. She reports that she does not use drugs.    family history includes Colon cancer in her sister; Colon polyps in her mother; Diabetes in her niece; Heart disease in her brother, father, and mother; Hypertension in her brother, daughter, father, maternal aunt, maternal grandfather, maternal grandmother, mother, paternal grandfather, paternal grandmother, sister, and son; Stroke in her father.    Allergies   Allergen Reactions   • Accupril [Quinapril Hcl] Swelling, Other (See Comments), GI Intolerance and Delirium     HA, constipation    • Ahist [Chlorpheniramine] Nausea Only, Other (See Comments) and Dizziness     Headache, Blurred vision   • Clarithromycin Nausea Only, Other (See Comments) and Mental Status Change     HA,  Depression, Flushing   • Esomeprazole GI Intolerance   • Levalbuterol Swelling   • Levocetirizine Diarrhea and GI Intolerance   • Lipitor [Atorvastatin] Other (See Comments) and Myalgia     Dark urine   • Omeprazole Nausea Only and Other (See Comments)     HA   • Pravachol [Pravastatin] Nausea Only and GI Intolerance     Bloated, Constipation, HA   • Sulindac Other (See Comments) and Myalgia     HA, joint pain, bruising   • Valdecoxib Irritability   • Chlorcyclizine Unknown - Low Severity   • Diclofenac Sodium Unknown - Low Severity   • Diphenhydramine Unknown - Low Severity   • Lodine [Etodolac] Unknown - Low Severity   • Sulfa Antibiotics Unknown - Low Severity       Medication:  Antibiotics:  Cephalexin 500 mg p.o. 3 times daily    Please refer to the medical record for a full medication list    Review of Systems  Pertinent items are noted in HPI, all other systems reviewed and negative    Objective   Vital Signs   Temp:  [97.8 °F (36.6 °C)-98.6 °F (37 °C)] 98 °F (36.7 °C)  Heart Rate:  [79-99] 79  Resp:  [20-24] 20  BP: (106-129)/(77-89) 106/77  96% on 2 L nasal cannula    Physical Exam:   General: In no acute distress  HEENT: Normocephalic, atraumatic, PERRL, no scleral icterus. Oropharynx is clear and moist  Neck: Supple, trachea is midline  Cardiovascular: Normal rate, regular rhythm, normal S1 and S2, no murmurs, rubs, or gallops   Respiratory: A lot of upper airway sounds, no wheezing, some crackles  GI: Abdomen is soft, nontender, nondistended, positive bowel sounds bilaterally,   Musculoskeletal:no edema, tenderness or deformity  Skin: No rashes   Extremities: No C/C, large legs with some mild edema  Neurological: Alert and oriented, moving all 4 extremities  Psychiatric: Normal mood and affect     Results Review:   I reviewed the patient's new clinical results.  I reviewed the patient's new imaging results and agree with the interpretation.    Lab Results   Component Value Date    WBC 23.36 (H)  10/01/2020    HGB 11.3 (L) 10/01/2020    HCT 35.1 10/01/2020    MCV 94.1 10/01/2020     10/01/2020       Lab Results   Component Value Date    GLUCOSE 189 (H) 10/01/2020    BUN 18 10/01/2020    CREATININE 0.90 10/01/2020    EGFRIFNONA 60 (L) 10/01/2020    BCR 20.0 10/01/2020    CO2 27.4 10/01/2020    CALCIUM 8.5 (L) 10/01/2020    PROTENTOTREF 4.9 (L) 04/08/2014    ALBUMIN 3.50 10/01/2020    LABIL2 1.6 05/26/2020    AST 16 10/01/2020    ALT 19 10/01/2020     Procalcitonin 0.05 -> 0.06    Microbiology:  9/30 SCx (gram stain GNR, yeast, GPC) cx P  9/29 BCx NGTD x 2  9/29 COVID neg    Radiology:  X-ray of the thoracic spine shows a compression deformity at T5 this could be possibly an acute or age-indeterminate finding.    No lower extremity DVT    CT angiogram of the chest personally reviewed by me shows no PE.  Shows right lower lobe infiltrate    Assessment/Plan   Acute on chronic COPD asthma exacerbation  CLL  Low immunoglobulin levels  Possible right-sided pneumonia  Recurrent rhinovirus/enterovirus infection  Chronically elevated partially paralyzed left hemidiaphragm  Secondary pulmonary hypertension  Type 2 diabetes  Obstructive sleep apnea  Morbid obesity    The patient has presented multiple times to the hospital in the last few months with a similar clinical findings.  She has CLL and her elevated white blood cell count is consistent with this.  Her procalcitonin is negative which is reassuring.  I would like to obtain a respiratory viral panel as she has tested multiple times for rhinovirus/enterovirus.  At this time I see no indication for antibiotics.  Continue suppressive Keflex 500 mg p.o. every 8 hours for her history of right prosthetic knee septic arthritis.  We will repeat a procalcitonin in the morning.  Infiltrate on her CAT scan is not very impressive and the patient has recently completed an antibiotic course.  We will certainly follow-up sputum culture.    I discussed the patient's  findings and my recommendations with patient and nursing staff

## 2020-10-01 NOTE — PROGRESS NOTES
Pharmacy Consult: Warfarin Dosing/ Monitoring    Caitlyn Longoria is a 85 y.o. female, estimated creatinine clearance is 49.3 mL/min (by C-G formula based on SCr of 0.9 mg/dL). weighing 95.7 kg (211 lb).     has a past medical history of Aortic calcification (CMS/[facility]), Aortic regurgitation, Asthma, Atrial fibrillation (CMS/[facility]), CAD (coronary artery disease), Chronic combined systolic and diastolic congestive heart failure (CMS/[facility]), CKD (chronic kidney disease) stage 3, GFR 30-59 ml/min (CMS/[facility]), Coronary artery disease involving native coronary artery of native heart with angina pectoris (CMS/[facility]), Disc degeneration, lumbar, DM type 2 (diabetes mellitus, type 2) (CMS/[facility]), GERD (gastroesophageal reflux disease), History of aneurysm, History of blood transfusion, History of fracture, History of vitamin D deficiency, Hyperlipidemia, Hypertension, Mild mitral regurgitation, Mitral annular calcification, PAF (paroxysmal atrial fibrillation) (CMS/[facility]), Peripheral neuropathy, Skin cancer, Sleep apnea, SSS (sick sinus syndrome) (CMS/[facility]), Stroke (cerebrum) (CMS/[facility]), and Tricuspid regurgitation.    Social History     Tobacco Use    Smoking status: Never Smoker    Smokeless tobacco: Never Used    Tobacco comment: caffeine use- soda   Substance Use Topics    Alcohol use: Not Currently    Drug use: No       Results from last 7 days   Lab Units 10/01/20  0710 09/30/20  0702 09/29/20  1755   INR  3.60* 3.78* 3.41*   HEMOGLOBIN g/dL 11.3*  --  12.7   HEMATOCRIT % 35.1  --  39.2   PLATELETS 10*3/mm3 145  --  158     Results from last 7 days   Lab Units 10/01/20  0710 09/30/20  0702 09/29/20  1755   SODIUM mmol/L 140 142 141   POTASSIUM mmol/L 3.9 4.1 3.2*   CHLORIDE mmol/L 103 108* 105   CO2 mmol/L 27.4 21.9* 22.9   BUN mg/dL 18 16 16   CREATININE mg/dL 0.90 0.84 1.00   CALCIUM mg/dL 8.5* 8.3* 9.1   BILIRUBIN mg/dL 0.4  --  0.8   ALK PHOS U/L 91  --  105   ALT (SGPT) U/L 19  --  23   AST (SGOT) U/L 16  --  17   GLUCOSE mg/dL  189* 235* 131*     Anticoagulation history: h/o atrial fibrillation managed on warfarin through [facility]. As of 9/23 regimen noted as: Warfarin 2mg Farr/T/W/Th/Sa and Warfarin 4mg M/F    Hospital Anticoagulation:  Consulting provider: Dr. Yoo  Start date: 9/30  Indication: Atrial Fibrillation  Target INR: 2-3  Expected duration: Indefinite   Bridge Therapy: No                  Date 9/29 9/30  10/1          INR 3.41 3.78  3.6          Warfarin dose 0 0 0             Potential drug interactions: Acetaminophen (prn) - may result in increased risk of bleeding     -- once dose doxy / ctx on 9/29     Relevant nutrition status:     Other:     Education complete?/ Date: Not at this time    Assessment/Plan:  INR supratherapeutic continue to hold warfarin     Warfarin on hold    2.   Monitor: INR trends, DDI, s/s of bleeding    Pharmacy will continue to follow until discharge or discontinuation of warfarin.   Catherine Yang RPH  10/1/2020

## 2020-10-02 PROBLEM — J15.6 PNEUMONIA DUE TO GRAM-NEGATIVE BACTERIA: Status: ACTIVE | Noted: 2020-10-02

## 2020-10-02 PROBLEM — J15.69 PNEUMONIA DUE TO GRAM-NEGATIVE BACTERIA: Status: ACTIVE | Noted: 2020-10-02

## 2020-10-02 LAB
ALBUMIN SERPL-MCNC: 3.8 G/DL (ref 3.5–5.2)
ALBUMIN/GLOB SERPL: 1.6 G/DL
ALP SERPL-CCNC: 106 U/L (ref 39–117)
ALT SERPL W P-5'-P-CCNC: 25 U/L (ref 1–33)
ANION GAP SERPL CALCULATED.3IONS-SCNC: 6.9 MMOL/L (ref 5–15)
AST SERPL-CCNC: 22 U/L (ref 1–32)
BACTERIA SPEC RESP CULT: ABNORMAL
BACTERIA SPEC RESP CULT: ABNORMAL
BILIRUB SERPL-MCNC: 0.5 MG/DL (ref 0–1.2)
BUN SERPL-MCNC: 25 MG/DL (ref 8–23)
BUN/CREAT SERPL: 28.7 (ref 7–25)
CALCIUM SPEC-SCNC: 8.4 MG/DL (ref 8.6–10.5)
CHLORIDE SERPL-SCNC: 101 MMOL/L (ref 98–107)
CO2 SERPL-SCNC: 31.1 MMOL/L (ref 22–29)
CREAT SERPL-MCNC: 0.87 MG/DL (ref 0.57–1)
DEPRECATED RDW RBC AUTO: 42.9 FL (ref 37–54)
ERYTHROCYTE [DISTWIDTH] IN BLOOD BY AUTOMATED COUNT: 12.3 % (ref 12.3–15.4)
GFR SERPL CREATININE-BSD FRML MDRD: 62 ML/MIN/1.73
GLOBULIN UR ELPH-MCNC: 2.4 GM/DL
GLUCOSE BLDC GLUCOMTR-MCNC: 131 MG/DL (ref 70–130)
GLUCOSE BLDC GLUCOMTR-MCNC: 136 MG/DL (ref 70–130)
GLUCOSE BLDC GLUCOMTR-MCNC: 168 MG/DL (ref 70–130)
GLUCOSE BLDC GLUCOMTR-MCNC: 219 MG/DL (ref 70–130)
GLUCOSE SERPL-MCNC: 148 MG/DL (ref 65–99)
GRAM STN SPEC: ABNORMAL
HCT VFR BLD AUTO: 38.8 % (ref 34–46.6)
HGB BLD-MCNC: 12.4 G/DL (ref 12–15.9)
INR PPP: 2.31 (ref 0.9–1.1)
LYMPHOCYTES # BLD MANUAL: 17.89 10*3/MM3 (ref 0.7–3.1)
LYMPHOCYTES NFR BLD MANUAL: 1 % (ref 5–12)
LYMPHOCYTES NFR BLD MANUAL: 79 % (ref 19.6–45.3)
MCH RBC QN AUTO: 30.2 PG (ref 26.6–33)
MCHC RBC AUTO-ENTMCNC: 32 G/DL (ref 31.5–35.7)
MCV RBC AUTO: 94.4 FL (ref 79–97)
MONOCYTES # BLD AUTO: 0.23 10*3/MM3 (ref 0.1–0.9)
NEUTROPHILS # BLD AUTO: 4.53 10*3/MM3 (ref 1.7–7)
NEUTROPHILS NFR BLD MANUAL: 20 % (ref 42.7–76)
PLAT MORPH BLD: NORMAL
PLATELET # BLD AUTO: 158 10*3/MM3 (ref 140–450)
PMV BLD AUTO: 10.1 FL (ref 6–12)
POTASSIUM SERPL-SCNC: 3.7 MMOL/L (ref 3.5–5.2)
PROCALCITONIN SERPL-MCNC: 0.07 NG/ML (ref 0–0.25)
PROT SERPL-MCNC: 6.2 G/DL (ref 6–8.5)
PROTHROMBIN TIME: 24.7 SECONDS (ref 11.7–14.2)
RBC # BLD AUTO: 4.11 10*6/MM3 (ref 3.77–5.28)
RBC MORPH BLD: NORMAL
SMUDGE CELLS BLD QL SMEAR: ABNORMAL
SODIUM SERPL-SCNC: 139 MMOL/L (ref 136–145)
WBC # BLD AUTO: 22.64 10*3/MM3 (ref 3.4–10.8)

## 2020-10-02 PROCEDURE — 94799 UNLISTED PULMONARY SVC/PX: CPT

## 2020-10-02 PROCEDURE — 80053 COMPREHEN METABOLIC PANEL: CPT | Performed by: INTERNAL MEDICINE

## 2020-10-02 PROCEDURE — 63710000001 INSULIN LISPRO (HUMAN) PER 5 UNITS: Performed by: HOSPITALIST

## 2020-10-02 PROCEDURE — 25010000002 PIPERACILLIN SOD-TAZOBACTAM PER 1 G: Performed by: INTERNAL MEDICINE

## 2020-10-02 PROCEDURE — 97110 THERAPEUTIC EXERCISES: CPT

## 2020-10-02 PROCEDURE — 85027 COMPLETE CBC AUTOMATED: CPT | Performed by: INTERNAL MEDICINE

## 2020-10-02 PROCEDURE — 85610 PROTHROMBIN TIME: CPT | Performed by: HOSPITALIST

## 2020-10-02 PROCEDURE — 82962 GLUCOSE BLOOD TEST: CPT

## 2020-10-02 PROCEDURE — 99232 SBSQ HOSP IP/OBS MODERATE 35: CPT | Performed by: INTERNAL MEDICINE

## 2020-10-02 PROCEDURE — 84145 PROCALCITONIN (PCT): CPT | Performed by: INTERNAL MEDICINE

## 2020-10-02 PROCEDURE — 99231 SBSQ HOSP IP/OBS SF/LOW 25: CPT | Performed by: INTERNAL MEDICINE

## 2020-10-02 RX ORDER — HYDROCODONE BITARTRATE AND ACETAMINOPHEN 5; 325 MG/1; MG/1
1 TABLET ORAL EVERY 6 HOURS PRN
Status: DISPENSED | OUTPATIENT
Start: 2020-10-02 | End: 2020-10-09

## 2020-10-02 RX ORDER — CEPHALEXIN 500 MG/1
500 CAPSULE ORAL 3 TIMES DAILY
Status: DISCONTINUED | OUTPATIENT
Start: 2020-10-03 | End: 2020-10-06

## 2020-10-02 RX ORDER — WARFARIN SODIUM 2 MG/1
2 TABLET ORAL
Status: COMPLETED | OUTPATIENT
Start: 2020-10-02 | End: 2020-10-02

## 2020-10-02 RX ADMIN — OXAZEPAM 10 MG: 10 CAPSULE, GELATIN COATED ORAL at 20:37

## 2020-10-02 RX ADMIN — ASPIRIN 81 MG: 81 TABLET, COATED ORAL at 08:52

## 2020-10-02 RX ADMIN — SODIUM CHLORIDE, PRESERVATIVE FREE 10 ML: 5 INJECTION INTRAVENOUS at 20:38

## 2020-10-02 RX ADMIN — FUROSEMIDE 20 MG: 20 TABLET ORAL at 07:32

## 2020-10-02 RX ADMIN — FLUTICASONE PROPIONATE 1 SPRAY: 50 SPRAY, METERED NASAL at 08:53

## 2020-10-02 RX ADMIN — HYDROCODONE BITARTRATE AND ACETAMINOPHEN 1 TABLET: 5; 325 TABLET ORAL at 15:48

## 2020-10-02 RX ADMIN — CARVEDILOL 3.12 MG: 3.12 TABLET, FILM COATED ORAL at 20:38

## 2020-10-02 RX ADMIN — Medication 4 ML: at 22:38

## 2020-10-02 RX ADMIN — INSULIN LISPRO 3 UNITS: 100 INJECTION, SOLUTION INTRAVENOUS; SUBCUTANEOUS at 11:59

## 2020-10-02 RX ADMIN — WARFARIN 2 MG: 2 TABLET ORAL at 18:43

## 2020-10-02 RX ADMIN — GLIPIZIDE 2.5 MG: 5 TABLET ORAL at 07:32

## 2020-10-02 RX ADMIN — MONTELUKAST SODIUM 10 MG: 10 TABLET, FILM COATED ORAL at 20:37

## 2020-10-02 RX ADMIN — IPRATROPIUM BROMIDE AND ALBUTEROL SULFATE 3 ML: 2.5; .5 SOLUTION RESPIRATORY (INHALATION) at 09:12

## 2020-10-02 RX ADMIN — HYDROCODONE BITARTRATE AND ACETAMINOPHEN 1 TABLET: 5; 325 TABLET ORAL at 23:47

## 2020-10-02 RX ADMIN — PANTOPRAZOLE SODIUM 40 MG: 40 TABLET, DELAYED RELEASE ORAL at 08:52

## 2020-10-02 RX ADMIN — TAZOBACTAM SODIUM AND PIPERACILLIN SODIUM 3.38 G: 375; 3 INJECTION, SOLUTION INTRAVENOUS at 11:59

## 2020-10-02 RX ADMIN — INSULIN LISPRO 3 UNITS: 100 INJECTION, SOLUTION INTRAVENOUS; SUBCUTANEOUS at 08:52

## 2020-10-02 RX ADMIN — IPRATROPIUM BROMIDE AND ALBUTEROL SULFATE 3 ML: 2.5; .5 SOLUTION RESPIRATORY (INHALATION) at 12:07

## 2020-10-02 RX ADMIN — FLUTICASONE PROPIONATE 1 SPRAY: 50 SPRAY, METERED NASAL at 20:38

## 2020-10-02 RX ADMIN — LOSARTAN POTASSIUM 25 MG: 25 TABLET, FILM COATED ORAL at 08:52

## 2020-10-02 RX ADMIN — SODIUM CHLORIDE, PRESERVATIVE FREE 10 ML: 5 INJECTION INTRAVENOUS at 08:53

## 2020-10-02 RX ADMIN — IPRATROPIUM BROMIDE AND ALBUTEROL SULFATE 3 ML: 2.5; .5 SOLUTION RESPIRATORY (INHALATION) at 16:47

## 2020-10-02 RX ADMIN — CEPHALEXIN 500 MG: 500 CAPSULE ORAL at 08:52

## 2020-10-02 RX ADMIN — ROSUVASTATIN CALCIUM 20 MG: 20 TABLET, FILM COATED ORAL at 20:37

## 2020-10-02 RX ADMIN — Medication 4 ML: at 09:12

## 2020-10-02 RX ADMIN — OXYBUTYNIN CHLORIDE 10 MG: 10 TABLET, EXTENDED RELEASE ORAL at 20:37

## 2020-10-02 RX ADMIN — CARVEDILOL 3.12 MG: 3.12 TABLET, FILM COATED ORAL at 08:52

## 2020-10-02 RX ADMIN — IPRATROPIUM BROMIDE AND ALBUTEROL SULFATE 3 ML: 2.5; .5 SOLUTION RESPIRATORY (INHALATION) at 22:38

## 2020-10-02 NOTE — PROGRESS NOTES
Subjective     REASON FOR CONSULTATION: Chronic lymphocytic leukemia  Provide an opinion on any further workup or treatment                             REQUESTING PHYSICIAN:    RECORDS OBTAINED:  Records of the patients history including those obtained from the referring provider were reviewed and summarized in detail.    HISTORY OF PRESENT ILLNESS:  The patient is a 85 y.o. year old female who is here for an opinion about the above issue.    Shortness of Breath  Pertinent negatives include no abdominal pain, chest pain, fever, headaches, leg swelling, rash, sore throat, vomiting or wheezing.      Patient is an 85-year-old female whom I had seen in 2014 when she was hospitalized at [facility] and was diagnosed with chronic lymphocytic leukemia.  She has not been to see me in over 4 years and is following with Dr. Suarez her primary care doctor and her white count is gone up a little higher than usual to 22,000 and she is referred back to us for evaluation.  We are doing a telephone visit because of the coronavirus pandemic and her age and susceptibility.     When I originally saw her in 2014 she was brought into the ER unresponsive in septic shock on 03/25/2014 with methicillin-resistant staphylococcus identified in her blood cultures. The patient has been on antibiotic with improvement, but had some residual kidney damage with a creatinine in the 4-range requiring hemodialysis, and was noted on admission to have an elevated white count with lymphocytosis, normal hemoglobin, but a platelet count of 95,000, and over the course of the illness her platelet count normalized and the white count had gone up into the 20,000 range with lymphocytosis. A flow cytometry was sent which is consistent with CLL, she came to our office once or twice and then stopped coming because she was so stable      She tells me now she was hospitalized recently with rhinovirus infection and has had diagnosed with asthma and is having a lot of  respiratory issues but does not require oxygen.She is at the [facility] in independent living and managing fairly well     She denies fever sweats or weight loss.  Her last white count was on 5/26/2020  Showed a white count of 18,000 with 66 lymphs and 27 neutrophils platelet white hemoglobin is 12.6 platelet 188,000     I reassured Christopher that no treatment is indicated for this level of leukocytosis from CLL and if she has no systemic complaints I do not feel strongly about doing CAT scans at her age but I would like to physically examine her in the office in a couple of months to make sure there is no obvious adenopathy or hepatosplenomegaly.     She does have recurrent infections and we can check quantitative immunoglobulins to see how low they are as another adjunctive option to prevent recurrent infections if she has hypogammaglobulinemia     She remains on Coumadin for atrial fibrillation    Patient was recently discharged from the hospital August 31, 2020.  She presented with shortness of breath at that time.  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  Patient was seen by Dr. Metcalf and felt that outpatient IVIG could be done.  At the time of discharge patient's oxygenation type improved significantly.  Her hemoglobin was 12.8, white count of 18.62 and a platelet count of 210.    Patient got admitted with shortness of breath.  She also had elevated INR at 3.41.  Given that her white count is elevated and she had a cough they cultured her and they were concerned about pneumonia.  She received ceftriaxone and doxycycline in the emergency room.  COVID testing was done and it is pending.    CT angiogram of the chest showed     IMPRESSION:  No evidence of pulmonary thromboembolism. There is a new  infiltrate and atelectasis in the posterior right lower lobe where there  are some secretions opacifying several peripheral bronchioles. No other  acute appearing abnormality is present.     Patient is thought to have chronic bronchitis    Pulmonary consult has been obtained.  Currently is on ceftriaxone    Interval history:  Patient is doing well no complaints    Past Medical History:   Diagnosis Date   • Aortic calcification (CMS/HCC)     mild, on echo 12/17/2017   • Aortic regurgitation     Trace   • Asthma    • Atrial fibrillation (CMS/HCC)    • CAD (coronary artery disease)    • Chronic combined systolic and diastolic congestive heart failure (CMS/HCC)    • CKD (chronic kidney disease) stage 3, GFR 30-59 ml/min    • Coronary artery disease involving native coronary artery of native heart with angina pectoris (CMS/HCC)    • Disc degeneration, lumbar    • DM type 2 (diabetes mellitus, type 2) (CMS/HCC)    • GERD (gastroesophageal reflux disease)    • History of aneurysm     right femoral artery s/p LHC   • History of blood transfusion    • History of fracture    • History of vitamin D deficiency    • Hyperlipidemia    • Hypertension    • Mild mitral regurgitation    • Mitral annular calcification     12/8/2017- echo, moderate   • PAF (paroxysmal atrial fibrillation) (CMS/HCC)    • Peripheral neuropathy    • Skin cancer     Left hand   • Sleep apnea    • SSS (sick sinus syndrome) (CMS/HCC)    • Stroke (cerebrum) (CMS/HCC)    • Tricuspid regurgitation     Trace        Past Surgical History:   Procedure Laterality Date   • CARDIAC CATHETERIZATION     • CARDIAC ELECTROPHYSIOLOGY PROCEDURE N/A 2/7/2020    Procedure: PPM generator change - dual  medtronic;  Surgeon: Kiel Field MD;  Location: [facility] INVASIVE LOCATION;  Service: Cardiology;  Laterality: N/A;   • CHOLECYSTECTOMY     • CORONARY STENT PLACEMENT     • HERNIA REPAIR      hital hernia   • HYSTERECTOMY     • PACEMAKER IMPLANTATION     • REPLACEMENT TOTAL KNEE Bilateral         No current facility-administered medications on file prior to encounter.      Current Outpatient Medications on File Prior to Encounter   Medication Sig  Dispense Refill   • acetaminophen (TYLENOL) 500 MG tablet Take 1,000 mg by mouth 2 (two) times a day.     • Acetylcysteine 500 MG capsule Take 1,000 mg by mouth Daily With Breakfast & Dinner.     • albuterol (PROVENTIL) (2.5 MG/3ML) 0.083% nebulizer solution Take 2.5 mg by nebulization Every 4 (Four) Hours.     • albuterol sulfate  (90 Base) MCG/ACT inhaler Inhale 2 puffs Every 4 (Four) Hours As Needed for Wheezing. 1 inhaler 3   • aspirin 81 MG tablet Take 81 mg by mouth Daily.     • Benralizumab (FASENRA SC) Inject  under the skin into the appropriate area as directed. Gets one shot a month, next shot may 13 then one every 8 weeks     • Calcium Carbonate-Vitamin D (CALCIUM 500 + D PO) Take  by mouth 2 (two) times a day.     • carvedilol (COREG) 3.125 MG tablet TAKE ONE TABLET BY MOUTH TWICE A  tablet 1   • cephalexin (KEFLEX) 500 MG capsule Take 500 mg by mouth 3 (Three) Times a Day. 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 She had RX filled at [facility] on 9/29/20 for #270 with refills.     • cetirizine (zyrTEC) 10 MG tablet Take 10 mg by mouth 2 (Two) Times a Day. Patient's provider - YASIR Dave     • fluticasone (FLONASE) 50 MCG/ACT nasal spray 1 spray into the nostril(s) as directed by provider Daily.     • Fluticasone-Umeclidin-Vilant (Trelegy Ellipta) 100-62.5-25 MCG/INH aerosol powder  Inhale 1 puff Daily. As directed      • furosemide (LASIX) 20 MG tablet Take 20 mg by mouth Every Morning.     • glipiZIDE (GLUCOTROL) 5 MG tablet Take 2.5 mg by mouth Every Morning.     • loperamide (IMODIUM) 2 MG capsule Take 2 mg by mouth Daily. After bowel movement.     • losartan (COZAAR) 25 MG tablet Take 1 tablet by mouth Daily. 30 tablet 11   • montelukast (SINGULAIR) 10 MG tablet Take 10 mg by mouth Every Night.     • Omega-3 Fatty Acids (FISH OIL) 1000 MG capsule capsule Take 1,000 mg by mouth 2 (Two) Times a Day With Meals.     • oxazepam (SERAX) 10 MG capsule Take 1 capsule by mouth Every Night. 5  capsule 0   • rosuvastatin (CRESTOR) 20 MG tablet Take 20 mg by mouth Every Night.     • sodium chloride 7 % nebulizer solution nebulizer solution Take 4 mL by nebulization 2 (two) times a day.     • warfarin (COUMADIN) 4 MG tablet Take one-half tablet (2 mg) by mouth Tues, Thurs, and Sat, and take one tablet (4 mg) by mouth all other days or as directed. (Patient taking differently: Take one-half tablet (2 mg) by mouth all other day, and take one tablet (4 mg) by mouth Mondays and friday.) 75 tablet 1   • ketoconazole (NIZORAL) 2 % cream Apply  topically to the appropriate area as directed Daily.     • Menthol, Topical Analgesic, (BIOFREEZE ROLL-ON EX) Apply  topically.     • oxybutynin XL (DITROPAN-XL) 10 MG 24 hr tablet Take 10 mg by mouth every night at bedtime.     • pantoprazole (PROTONIX) 40 MG EC tablet Take 40 mg by mouth Daily.     • polyethyl glycol-propyl glycol (LUBRICATING EYE DROPS) 0.4-0.3 % solution ophthalmic solution Administer 1 drop to both eyes Every 1 (One) Hour As Needed.          ALLERGIES:    Allergies   Allergen Reactions   • Accupril [Quinapril Hcl] Swelling, Other (See Comments), GI Intolerance and Delirium     HA, constipation    • Ahist [Chlorpheniramine] Nausea Only, Other (See Comments) and Dizziness     Headache, Blurred vision   • Clarithromycin Nausea Only, Other (See Comments) and Mental Status Change     HA, Depression, Flushing   • Esomeprazole GI Intolerance   • Levalbuterol Swelling   • Levocetirizine Diarrhea and GI Intolerance   • Lipitor [Atorvastatin] Other (See Comments) and Myalgia     Dark urine   • Omeprazole Nausea Only and Other (See Comments)     HA   • Pravachol [Pravastatin] Nausea Only and GI Intolerance     Bloated, Constipation, HA   • Sulindac Other (See Comments) and Myalgia     HA, joint pain, bruising   • Valdecoxib Irritability   • Chlorcyclizine Unknown - Low Severity   • Diclofenac Sodium Unknown - Low Severity   • Diphenhydramine Unknown - Low Severity    • Lodine [Etodolac] Unknown - Low Severity   • Sulfa Antibiotics Unknown - Low Severity        Social History     Socioeconomic History   • Marital status: Single     Spouse name: Not on file   • Number of children: Not on file   • Years of education: Not on file   • Highest education level: Not on file   Occupational History     Employer: RETIRED   Tobacco Use   • Smoking status: Never Smoker   • Smokeless tobacco: Never Used   • Tobacco comment: caffeine use- soda   Substance and Sexual Activity   • Alcohol use: Not Currently   • Drug use: No   • Sexual activity: Defer   Lifestyle   • Physical activity     Days per week: 0 days     Minutes per session: 0 min   • Stress: Only a little        Family History   Problem Relation Age of Onset   • Heart disease Mother    • Hypertension Mother    • Colon polyps Mother    • Heart disease Father    • Hypertension Father    • Stroke Father    • Hypertension Brother    • Heart disease Brother    • Diabetes Niece    • Colon cancer Sister    • Hypertension Sister    • Hypertension Daughter    • Hypertension Son    • Hypertension Maternal Aunt    • Hypertension Maternal Grandmother    • Hypertension Maternal Grandfather    • Hypertension Paternal Grandmother    • Hypertension Paternal Grandfather         Review of Systems   Constitutional: Negative for appetite change, chills, diaphoresis, fatigue, fever and unexpected weight change.   HENT: Negative for hearing loss, sore throat and trouble swallowing.    Respiratory: Positive for cough and shortness of breath. Negative for chest tightness and wheezing.    Cardiovascular: Negative for chest pain, palpitations and leg swelling.   Gastrointestinal: Negative for abdominal distention, abdominal pain, constipation, diarrhea, nausea and vomiting.   Genitourinary: Negative for dysuria, frequency, hematuria and urgency.   Musculoskeletal: Negative for joint swelling.        No muscle weakness.   Skin: Negative for rash and wound.    Neurological: Negative for seizures, syncope, speech difficulty, weakness, numbness and headaches.   Hematological: Negative for adenopathy. Does not bruise/bleed easily.   Psychiatric/Behavioral: Negative for behavioral problems, confusion and suicidal ideas.      I have reviewed and confirmed the accuracy of the ROS as documented by the MA/LPN/RN Deja Moran MD        Objective     Vitals:    10/01/20 2027 10/01/20 2032 10/01/20 2300 10/02/20 0912   BP:   105/63    BP Location:   Left arm    Patient Position:   Lying    Pulse:   79 77   Resp: 18 18 18 20   Temp:   97.2 °F (36.2 °C)    TempSrc:   Oral    SpO2:    94%   Weight:       Height:         Current Status 8/14/2020   ECOG score 1       Physical Exam  Vitals signs and nursing note reviewed.   Constitutional:       Appearance: Normal appearance. She is well-developed.   HENT:      Head: Normocephalic and atraumatic.      Mouth/Throat:      Dentition: Normal dentition.   Eyes:      General: Lids are normal.      Pupils: Pupils are equal, round, and reactive to light.   Neck:      Musculoskeletal: Normal range of motion and neck supple.      Thyroid: No thyroid mass or thyromegaly.      Trachea: Trachea normal. No tracheal deviation.   Cardiovascular:      Rate and Rhythm: Normal rate and regular rhythm.      Heart sounds: No murmur. No friction rub. No gallop.    Pulmonary:      Effort: Pulmonary effort is normal. No respiratory distress.      Breath sounds: Normal breath sounds.   Abdominal:      General: Bowel sounds are normal. There is no distension.      Palpations: Abdomen is soft. There is no mass.      Tenderness: There is no abdominal tenderness. There is no guarding or rebound.      Hernia: No hernia is present.   Musculoskeletal: Normal range of motion.      Comments:  Normal gait and station.  FROMx4.  No digital cyanosis.   Lymphadenopathy:      Cervical: No cervical adenopathy.   Skin:     General: Skin is warm and dry.      Findings: No  lesion or rash.   Neurological:      Mental Status: She is alert and oriented to person, place, and time.   Psychiatric:         Thought Content: Thought content normal.         I have reexamined the patient and the results are consistent with the previously documented exam. Deja Moran MD     RECENT LABS:  Hematology WBC   Date Value Ref Range Status   10/02/2020 22.64 (H) 3.40 - 10.80 10*3/mm3 Final     RBC   Date Value Ref Range Status   10/02/2020 4.11 3.77 - 5.28 10*6/mm3 Final     Hemoglobin   Date Value Ref Range Status   10/02/2020 12.4 12.0 - 15.9 g/dL Final     Hematocrit   Date Value Ref Range Status   10/02/2020 38.8 34.0 - 46.6 % Final     Platelets   Date Value Ref Range Status   10/02/2020 158 140 - 450 10*3/mm3 Final          Assessment/Plan     1. Chronic lymphocytic leukemia  -based on her hemoglobin   and a normal platelet count, no treatment indication exists.  · Currently her white count is 24K.  · She is admitted with bronchitis and is currently on ceftriaxone  · Stable white count, slightly elevated secondary to underlying bronchitis     2. We will check quantitative immunoglobulins to see if IVIG replacement may help with recurrent infections  · Discussed with pharmacy about consideration of giving IVIG inpatient but given shortage we will hold off  · Discussed with patient that we will give IVIG as outpatient    3.  Questionable pneumonia versus bronchitis,  · Patient's CT has been reviewed by me.  With a small infiltrate in the lung  · Shortness of breath has worsened  · Await pulmonary input  · Infectious disease felt the infiltrate in the lung is not impressive and they felt she needs to be on suppressive Keflex.  · Respiratory viral panel , patient has persistently positive rhinovirus from February 2020.  Infectious disease think that this is just chronic shedding and not true infection and IVIG may not help but okay to continue    Plan    1. Will obtain quantitative  immunoglobulins  2. Agree with antibiotics  3. If patient has recurrent infections will benefit from IVIG, last IVIG is to be scheduled as of today.  Unsure if he can get it while in the hospital.    4. Pharmacy states that is not possible to get IVIG inpatient unless it is a dire emergency  5. Okay to discharge from a point when ready  6. We will schedule patient in 1 week with nurse practitioner with IVIG    We will sign off    Deja Moran MD

## 2020-10-02 NOTE — THERAPY TREATMENT NOTE
Patient Name: Caitlyn Longoria  : 1934    MRN: [id number]                              Today's Date: 10/2/2020       Admit Date: 2020    Visit Dx:     ICD-10-CM ICD-9-CM   1. Dyspnea, unspecified type  R06.00 786.09   2. Pneumonia due to infectious organism, unspecified laterality, unspecified part of lung  J18.9 486   3. Leukocytosis, unspecified type  D72.829 288.60     Patient Active Problem List   Diagnosis   • Neck and shoulder pain   • Arthropathy of shoulder region   • Carpal tunnel syndrome of left wrist   • Chronic pain of both shoulders   • Chronic left shoulder pain   • Arthropathy of left shoulder   • Dysarthria   • DM II (diabetes mellitus, type II), controlled (CMS/[facility])   • Paroxysmal atrial fibrillation (CMS/[facility])   • HLD (hyperlipidemia)   • Chronic bronchitis (CMS/[facility])   • Coronary artery disease involving native coronary artery of native heart with angina pectoris (CMS/[facility])   • CVA (cerebral vascular accident) (CMS/[facility])   • HTN (hypertension)   • CKD (chronic kidney disease), stage III   • Chronic combined systolic and diastolic congestive heart failure (CMS/[facility])   • Infection of prosthetic right knee joint (CMS/[facility])   • Stasis dermatitis of right lower extremity due to peripheral venous hypertension   • Current use of long term anticoagulation   • Morbid obesity (CMS/[facility])   • Acute respiratory failure with hypoxia (CMS/[facility])   • Rhinovirus   • Asthma with acute exacerbation   • Acute respiratory failure with hypoxemia (CMS/[facility])   • Viral pneumonia   • Hypoxia   • CLL (chronic lymphoid leukemia) in relapse (CMS/[facility])   • Dyspnea   • Anticoagulated on Coumadin   • Closed wedge compression fracture of T5 vertebra (CMS/[facility])   • Pneumonia due to gram-negative bacteria (CMS/[facility])     Past Medical History:   Diagnosis Date   • Aortic calcification (CMS/[facility])     mild, on echo 2017   • Aortic regurgitation     Trace   • Asthma    • Atrial fibrillation (CMS/[facility])    • CAD (coronary artery  disease)    • Chronic combined systolic and diastolic congestive heart failure (CMS/[facility])    • CKD (chronic kidney disease) stage 3, GFR 30-59 ml/min    • Coronary artery disease involving native coronary artery of native heart with angina pectoris (CMS/[facility])    • Disc degeneration, lumbar    • DM type 2 (diabetes mellitus, type 2) (CMS/[facility])    • GERD (gastroesophageal reflux disease)    • History of aneurysm     right femoral artery s/p LHC   • History of blood transfusion    • History of fracture    • History of vitamin D deficiency    • Hyperlipidemia    • Hypertension    • Mild mitral regurgitation    • Mitral annular calcification     12/8/2017- echo, moderate   • PAF (paroxysmal atrial fibrillation) (CMS/[facility])    • Peripheral neuropathy    • Skin cancer     Left hand   • Sleep apnea    • SSS (sick sinus syndrome) (CMS/[facility])    • Stroke (cerebrum) (CMS/[facility])    • Tricuspid regurgitation     Trace     Past Surgical History:   Procedure Laterality Date   • CARDIAC CATHETERIZATION     • CARDIAC ELECTROPHYSIOLOGY PROCEDURE N/A 2/7/2020    Procedure: PPM generator change - dual  medtronic;  Surgeon: Kiel Field MD;  Location: [facility] INVASIVE LOC[facility];  Service: Cardiology;  Laterality: N/A;   • CHOLECYSTECTOMY     • CORONARY STENT PLACEMENT     • HERNIA REPAIR      hital hernia   • HYSTERECTOMY     • PACEMAKER IMPLANTATION     • REPLACEMENT TOTAL KNEE Bilateral      General Information     Row Name 10/02/20 1709          Physical Therapy Time and Intention    Document Type  therapy note (daily note)  -     Mode of Treatment  individual therapy;physical therapy  -     Row Name 10/02/20 1709          General Information    Patient Profile Reviewed  yes  -     Existing Precautions/Restrictions  fall;oxygen therapy device and L/min;brace worn when out of bed  -     Barriers to Rehab  medically complex  -       User Key  (r) = Recorded By, (t) = Taken By, (c) = Cosigned By    Initials Name Provider  Type    Jaylin Whittaker PTA Physical Therapy Assistant        Mobility     Row Name 10/02/20 1710          Bed Mobility    Supine-Sit [place] (Bed Mobility)  not tested  -     Sit-Supine [place] (Bed Mobility)  not tested  -     Comment (Bed Mobility)  educ on sitting/resting in bed time due to back prec  -     Row Name 10/02/20 1710          Sit-Stand Transfer    Sit-Stand [place] (Transfers)  contact guard;verbal cues  -     Assistive Device (Sit-Stand Transfers)  walker, front-wheeled  -     Row Name 10/02/20 1710          Gait/Stairs (Locomotion)    [place] Level (Gait)  contact guard;verbal cues;nonverbal cues (demo/gesture)  -     Assistive Device (Gait)  walker, front-wheeled  -     Distance in Feet (Gait)  150ft, cues for posture, cues to amb inside rwx for safety-used her rwx in room  -     Deviations/Abnormal Patterns (Gait)  base of support, wide;jeremi decreased;stride length decreased  -     Bilateral Gait Deviations  forward flexed posture  -     Comment (Gait/Stairs)  pt requested a breathing rx after amb  -       User Key  (r) = Recorded By, (t) = Taken By, (c) = Cosigned By    Initials Name Provider Type    Jaylin Whittaker PTA Physical Therapy Assistant        Obj/Interventions    No documentation.       Goals/Plan    No documentation.       Clinical Impression     Row Name 10/02/20 1713          Pain Scale: Numbers Pre/Post-Treatment    Pretreatment Pain Rating  5/10  -     Posttreatment Pain Rating  5/10  -     Pain Location  back  -     Pre/Posttreatment Pain Comment  less pain than before brace applied  -     Pain Intervention(s)  Medication (See MAR);Repositioned  -     Row Name 10/02/20 1713          Plan of Care Review    Plan of Care Reviewed With  patient  -     Progress  improving  -     Outcome Summary  pt hu tfers and amb, given extra time for all activity; she has her own routine; extensive educ on donning/doffing back  brace-written istructions per pt request so she can remember once home  -LIBRA     Row Name 10/02/20 1713          Therapy Assessment/Plan (PT)    Criteria for Skilled Interventions Met (PT)  yes  -LIBRA     Row Name 10/02/20 1713          Positioning and Restraints    Pre-Treatment Position  sitting in chair/recliner  -     Post Treatment Position  chair  -JM     In Chair  reclined;call light within reach;encouraged to call for assist;exit alarm on  -LIBRA       User Key  (r) = Recorded By, (t) = Taken By, (c) = Cosigned By    Initials Name Provider Type    Jaylin Whittaker PTA Physical Therapy Assistant        Outcome Measures     Row Name 10/02/20 1717          How much help from another person do you currently need...    Turning from your back to your side while in flat bed without using bedrails?  3  -JM     Moving from lying on back to sitting on the side of a flat bed without bedrails?  3  -JM     Moving to and from a bed to a chair (including a wheelchair)?  3  -JM     Standing up from a chair using your arms (e.g., wheelchair, bedside chair)?  3  -JM     Climbing 3-5 steps with a railing?  1  -JM     To walk in hospital room?  3  -JM     AM-PAC 6 Clicks Score (PT)  16  -       User Key  (r) = Recorded By, (t) = Taken By, (c) = Cosigned By    Initials Name Provider Type    Jaylin Whittaker PTA Physical Therapy Assistant        Physical Therapy Education                 Title: PT OT SLP Therapies (Done)     Topic: Physical Therapy (Done)     Point: Mobility training (Done)     Learning Progress Summary           Patient EILEEN Tidwell TB,BATSHEVA, DONALD,NR by LIBRA at 10/2/2020 1717    LACIE Tidwell DU,NR by MARIBELL at 10/1/2020 1558    Comment: review TLSO                   Point: Home exercise program (Done)     Learning Progress Summary           Patient EILEEN Tidwell TB,BATSHEVA, DONALD,NR by LIBRA at 10/2/2020 1717    LACIE Tidwell, STEFANIE,NR by MARIBELL at 10/1/2020 1558    Comment: review TLSO                   Point: Body mechanics (Done)     Learning  Progress Summary           Patient Eager, E,TB,D, VU,NR by LIBRA at 10/2/2020 1717    Eager, TB, DU,NR by MARIBELL at 10/1/2020 1558    Comment: review TLSO                   Point: Precautions (Done)     Learning Progress Summary           Patient Eager, E,TB,D, VU,NR by LIBRA at 10/2/2020 1717    Eager, TB, DU,NR by MARIBELL at 10/1/2020 1558    Comment: review TLSO                               User Key     Initials Effective Dates Name Provider Type Discipline     03/07/18 -  Jaylin Saunders PTA Physical Therapy Assistant PT    MARIBELL 10/25/19 -  Edie Lowry PT Physical Therapist PT              PT Recommendation and Plan     Plan of Care Reviewed With: patient  Progress: improving  Outcome Summary: pt hu tfers and amb, given extra time for all activity; she has her own routine; extensive educ on donning/doffing back brace-written istructions per pt request so she can remember once home     Time Calculation:   PT Charges     Row Name 10/02/20 1707             Time Calculation    Start Time  1610  -      Stop Time  1654  -      Time Calculation (min)  44 min  -      PT Received On  10/02/20  -      PT - Next Appointment  10/03/20  -        User Key  (r) = Recorded By, (t) = Taken By, (c) = Cosigned By    Initials Name Provider Type    Jaylin Whittaker PTA Physical Therapy Assistant        Therapy Charges for Today     Code Description Service Date Service Provider Modifiers Qty    [id number] HC PT THER PROC EA 15 MIN 10/2/2020 Jaylin Saunders PTA GP 3          PT G-Codes  Outcome Measure Options: AM-PAC 6 Clicks Basic Mobility (PT)  AM-PAC 6 Clicks Score (PT): 16    Jaylin Saunders PTA  10/2/2020

## 2020-10-02 NOTE — PLAN OF CARE
Problem: Adult Inpatient Plan of Care  Goal: Plan of Care Review  Flowsheets (Taken 10/2/2020 0412)  Progress: no change  Plan of Care Reviewed With: patient  Outcome Summary: chest congested cough productive of light green drainage isolation precautions in place complains of back pain tylenol given with good results inr 3.6 no coumadin given 10-1-20 tlso brace at bedside   Goal Outcome Evaluation:  Plan of Care Reviewed With: patient  Progress: no change  Outcome Summary: chest congested cough productive of light green drainage isolation precautions in place complains of back pain tylenol given with good results inr 3.6 no coumadin given 10-1-20 tlso brace at bedside

## 2020-10-02 NOTE — PLAN OF CARE
Goal Outcome Evaluation:  Plan of Care Reviewed With: patient  Progress: improving  Outcome Summary: Patient ambulated in barreto with PT.  Up in chair during shift.  VSS.  PRN pain medication given for back pain.  Will continue to montior closely.

## 2020-10-02 NOTE — PROGRESS NOTES
Pharmacy Consult: Warfarin Dosing/ Monitoring    Caitlyn Longoria is a 85 y.o. female, estimated creatinine clearance is 51 mL/min (by C-G formula based on SCr of 0.87 mg/dL). weighing 95.7 kg (211 lb).     has a past medical history of Aortic calcification (CMS/[facility]), Aortic regurgitation, Asthma, Atrial fibrillation (CMS/[facility]), CAD (coronary artery disease), Chronic combined systolic and diastolic congestive heart failure (CMS/[facility]), CKD (chronic kidney disease) stage 3, GFR 30-59 ml/min (CMS/[facility]), Coronary artery disease involving native coronary artery of native heart with angina pectoris (CMS/[facility]), Disc degeneration, lumbar, DM type 2 (diabetes mellitus, type 2) (CMS/[facility]), GERD (gastroesophageal reflux disease), History of aneurysm, History of blood transfusion, History of fracture, History of vitamin D deficiency, Hyperlipidemia, Hypertension, Mild mitral regurgitation, Mitral annular calcification, PAF (paroxysmal atrial fibrillation) (CMS/[facility]), Peripheral neuropathy, Skin cancer, Sleep apnea, SSS (sick sinus syndrome) (CMS/[facility]), Stroke (cerebrum) (CMS/[facility]), and Tricuspid regurgitation.    Social History     Tobacco Use    Smoking status: Never Smoker    Smokeless tobacco: Never Used    Tobacco comment: caffeine use- soda   Substance Use Topics    Alcohol use: Not Currently    Drug use: No       Results from last 7 days   Lab Units 10/02/20  0747 10/01/20  0710 09/30/20  0702 09/29/20  1755   INR  2.31* 3.60* 3.78* 3.41*   HEMOGLOBIN g/dL 12.4 11.3*  --  12.7   HEMATOCRIT % 38.8 35.1  --  39.2   PLATELETS 10*3/mm3 158 145  --  158     Results from last 7 days   Lab Units 10/02/20  0747 10/01/20  0710 09/30/20  0702 09/29/20  1755   SODIUM mmol/L 139 140 142 141   POTASSIUM mmol/L 3.7 3.9 4.1 3.2*   CHLORIDE mmol/L 101 103 108* 105   CO2 mmol/L 31.1* 27.4 21.9* 22.9   BUN mg/dL 25* 18 16 16   CREATININE mg/dL 0.87 0.90 0.84 1.00   CALCIUM mg/dL 8.4* 8.5* 8.3* 9.1   BILIRUBIN mg/dL 0.5 0.4  --  0.8   ALK PHOS U/L  106 91  --  105   ALT (SGPT) U/L 25 19  --  23   AST (SGOT) U/L 22 16  --  17   GLUCOSE mg/dL 148* 189* 235* 131*     Anticoagulation history: h/o atrial fibrillation managed on warfarin through [facility]. As of 9/23 regimen noted as: Warfarin 2mg Farr/T/W/Th/Sa and Warfarin 4mg M/F    Hospital Anticoagulation:  Consulting provider: Dr. Yoo  Start date: 9/30  Indication: Atrial Fibrillation  Target INR: 2-3  Expected duration: Indefinite   Bridge Therapy: No                  Date 9/29 9/30  10/1 10/2         INR 3.41 3.78  3.6 2.31         Warfarin dose 0 0 0  2  mg           Potential drug interactions:   Zosyn (started 10/2, Moderate): May enhance anticoagulant effect of warfarin  Crestor (home med, Moderate): May enhance anticoagulant effect of warfarin  Pantoprazole (Moderate, home med): May enhance anticoagulant effect of warfarin.  Acetaminophen (prn) - may result in increased risk of bleeding     -- once dose doxy / ctx on 9/29     Relevant nutrition status: Reg diet, 50-75% of intake charted yesterday    Other:     Education complete?/ Date: Not at this time    Assessment/Plan:  INR back in therapeutic range today.     Will give warfarin 2 mg dose today  2.   Monitor: INR trends, DDI, s/s of bleeding    Pharmacy will continue to follow until discharge or discontinuation of warfarin.     Leticia Bird, PharmD, BCPS   10/2/2020

## 2020-10-02 NOTE — CONSULTS
CONSULT NOTE    Patient Identification:  Caitlyn Longoria  85 y.o.  female  1934  [id number]            Requesting physician: Dr. Cruz    Reason for Consultation: Chronic dyspnea    CC:     History of Present Illness:  85-year-old female follows my partner Dr. Coburn with history of asthma/COPD acute hypoxemic respiratory failure on oxygen she has known history of chronic elevated partially paralyzed left hemidiaphragm ischemic cardiomyopathy diastolic dysfunction A. fib pacemaker placement mitral regurg pulmonary hypertension immunodeficiency.  She was discharged August 31 by my partner Dr. Olivier I reviewed the records.  She gets readmitted to the hospitalist service with chronic shortness of breath chronic cough chronic green sputum production.  She tells me that she actually has never felt better even after discharge from the hospital.  Currently she denies any fever chills or aspiration as such.  She is currently on 2 L oxygen which is her baseline.      Review of Systems  As above rest of the 12 point review of system negative  Past Medical History:  Past Medical History:   Diagnosis Date   • Aortic calcification (CMS/HCC)     mild, on echo 12/17/2017   • Aortic regurgitation     Trace   • Asthma    • Atrial fibrillation (CMS/HCC)    • CAD (coronary artery disease)    • Chronic combined systolic and diastolic congestive heart failure (CMS/HCC)    • CKD (chronic kidney disease) stage 3, GFR 30-59 ml/min    • Coronary artery disease involving native coronary artery of native heart with angina pectoris (CMS/HCC)    • Disc degeneration, lumbar    • DM type 2 (diabetes mellitus, type 2) (CMS/HCC)    • GERD (gastroesophageal reflux disease)    • History of aneurysm     right femoral artery s/p LHC   • History of blood transfusion    • History of fracture    • History of vitamin D deficiency    • Hyperlipidemia    • Hypertension    • Mild mitral regurgitation    • Mitral annular calcification     12/8/2017-  echo, moderate   • PAF (paroxysmal atrial fibrillation) (CMS/HCC)    • Peripheral neuropathy    • Skin cancer     Left hand   • Sleep apnea    • SSS (sick sinus syndrome) (CMS/HCC)    • Stroke (cerebrum) (CMS/HCC)    • Tricuspid regurgitation     Trace       Past Surgical History:  Past Surgical History:   Procedure Laterality Date   • CARDIAC CATHETERIZATION     • CARDIAC ELECTROPHYSIOLOGY PROCEDURE N/A 2/7/2020    Procedure: PPM generator change - dual  medtronic;  Surgeon: Kiel Field MD;  Location: [facility] INVASIVE LOCATION;  Service: Cardiology;  Laterality: N/A;   • CHOLECYSTECTOMY     • CORONARY STENT PLACEMENT     • HERNIA REPAIR      hital hernia   • HYSTERECTOMY     • PACEMAKER IMPLANTATION     • REPLACEMENT TOTAL KNEE Bilateral         Home Meds:  Medications Prior to Admission   Medication Sig Dispense Refill Last Dose   • acetaminophen (TYLENOL) 500 MG tablet Take 1,000 mg by mouth 2 (two) times a day.      • Acetylcysteine 500 MG capsule Take 1,000 mg by mouth Daily With Breakfast & Dinner.      • albuterol (PROVENTIL) (2.5 MG/3ML) 0.083% nebulizer solution Take 2.5 mg by nebulization Every 4 (Four) Hours.      • albuterol sulfate  (90 Base) MCG/ACT inhaler Inhale 2 puffs Every 4 (Four) Hours As Needed for Wheezing. 1 inhaler 3    • aspirin 81 MG tablet Take 81 mg by mouth Daily.      • Benralizumab (FASENRA SC) Inject  under the skin into the appropriate area as directed. Gets one shot a month, next shot may 13 then one every 8 weeks      • Calcium Carbonate-Vitamin D (CALCIUM 500 + D PO) Take  by mouth 2 (two) times a day.      • carvedilol (COREG) 3.125 MG tablet TAKE ONE TABLET BY MOUTH TWICE A  tablet 1    • cephalexin (KEFLEX) 500 MG capsule Take 500 mg by mouth 3 (Three) Times a Day. 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 She had RX filled at [facility] on 9/29/20 for #270 with refills.      • cetirizine (zyrTEC) 10 MG tablet Take 10 mg by mouth 2 (Two) Times a  Day. Patient's provider - YASIR Dave      • fluticasone (FLONASE) 50 MCG/ACT nasal spray 1 spray into the nostril(s) as directed by provider Daily.      • Fluticasone-Umeclidin-Vilant (Trelegy Ellipta) 100-62.5-25 MCG/INH aerosol powder  Inhale 1 puff Daily. As directed       • furosemide (LASIX) 20 MG tablet Take 20 mg by mouth Every Morning.      • glipiZIDE (GLUCOTROL) 5 MG tablet Take 2.5 mg by mouth Every Morning.      • loperamide (IMODIUM) 2 MG capsule Take 2 mg by mouth Daily. After bowel movement.      • losartan (COZAAR) 25 MG tablet Take 1 tablet by mouth Daily. 30 tablet 11    • montelukast (SINGULAIR) 10 MG tablet Take 10 mg by mouth Every Night.      • Omega-3 Fatty Acids (FISH OIL) 1000 MG capsule capsule Take 1,000 mg by mouth 2 (Two) Times a Day With Meals.      • oxazepam (SERAX) 10 MG capsule Take 1 capsule by mouth Every Night. 5 capsule 0    • rosuvastatin (CRESTOR) 20 MG tablet Take 20 mg by mouth Every Night.      • sodium chloride 7 % nebulizer solution nebulizer solution Take 4 mL by nebulization 2 (two) times a day.      • warfarin (COUMADIN) 4 MG tablet Take one-half tablet (2 mg) by mouth Tues, Thurs, and Sat, and take one tablet (4 mg) by mouth all other days or as directed. (Patient taking differently: Take one-half tablet (2 mg) by mouth all other day, and take one tablet (4 mg) by mouth Mondays and friday.) 75 tablet 1    • ketoconazole (NIZORAL) 2 % cream Apply  topically to the appropriate area as directed Daily.      • Menthol, Topical Analgesic, (BIOFREEZE ROLL-ON EX) Apply  topically.      • oxybutynin XL (DITROPAN-XL) 10 MG 24 hr tablet Take 10 mg by mouth every night at bedtime.      • pantoprazole (PROTONIX) 40 MG EC tablet Take 40 mg by mouth Daily.      • polyethyl glycol-propyl glycol (LUBRICATING EYE DROPS) 0.4-0.3 % solution ophthalmic solution Administer 1 drop to both eyes Every 1 (One) Hour As Needed.          Allergies:  Allergies   Allergen Reactions   • Accupril  [Quinapril Hcl] Swelling, Other (See Comments), GI Intolerance and Delirium     HA, constipation    • Ahist [Chlorpheniramine] Nausea Only, Other (See Comments) and Dizziness     Headache, Blurred vision   • Clarithromycin Nausea Only, Other (See Comments) and Mental Status Change     HA, Depression, Flushing   • Esomeprazole GI Intolerance   • Levalbuterol Swelling   • Levocetirizine Diarrhea and GI Intolerance   • Lipitor [Atorvastatin] Other (See Comments) and Myalgia     Dark urine   • Omeprazole Nausea Only and Other (See Comments)     HA   • Pravachol [Pravastatin] Nausea Only and GI Intolerance     Bloated, Constipation, HA   • Sulindac Other (See Comments) and Myalgia     HA, joint pain, bruising   • Valdecoxib Irritability   • Chlorcyclizine Unknown - Low Severity   • Diclofenac Sodium Unknown - Low Severity   • Diphenhydramine Unknown - Low Severity   • Lodine [Etodolac] Unknown - Low Severity   • Sulfa Antibiotics Unknown - Low Severity       Social History:   Social History     Socioeconomic History   • Marital status: Single     Spouse name: Not on file   • Number of children: Not on file   • Years of education: Not on file   • Highest education level: Not on file   Occupational History     Employer: RETIRED   Tobacco Use   • Smoking status: Never Smoker   • Smokeless tobacco: Never Used   • Tobacco comment: caffeine use- soda   Substance and Sexual Activity   • Alcohol use: Not Currently   • Drug use: No   • Sexual activity: Defer   Lifestyle   • Physical activity     Days per week: 0 days     Minutes per session: 0 min   • Stress: Only a little       Family History:  Family History   Problem Relation Age of Onset   • Heart disease Mother    • Hypertension Mother    • Colon polyps Mother    • Heart disease Father    • Hypertension Father    • Stroke Father    • Hypertension Brother    • Heart disease Brother    • Diabetes Niece    • Colon cancer Sister    • Hypertension Sister    • Hypertension  "Daughter    • Hypertension Son    • Hypertension Maternal Aunt    • Hypertension Maternal Grandmother    • Hypertension Maternal Grandfather    • Hypertension Paternal Grandmother    • Hypertension Paternal Grandfather        Physical Exam:  /76 (BP Location: Right arm, Patient Position: Sitting)   Pulse 81   Temp 97.5 °F (36.4 °C) (Oral)   Resp 20   Ht 157.5 cm (62\")   Wt 95.7 kg (211 lb)   SpO2 95%   BMI 38.59 kg/m²  Body mass index is 38.59 kg/m². 95% 95.7 kg (211 lb)  Physical Exam  Patient is examined using the personal protective equipment as per guidelines from infection control for this particular patient as enacted.  Hand hygiene was performed before and after patient interaction.  Well-developed obese body habitus  Eyes normal conjunctivae and pupils reactive to light  ENT Mallampati between 3 and 4 normal nasal exam  Neck midline trachea no thyromegaly  Chest diminished breath sounds coarse rhonchi throughout  CVS regular rate and rhythm no lower extremity edema  Abdomen soft nontender hepatosplenomegaly  CNS intact normal sensory exam  Skin no rashes no nodules  Psych oriented to time place and person normal memory  Musculoskeletal no cyanosis no clubbing normal range of motion        LABS:  Lab Results   Component Value Date    CALCIUM 8.5 (L) 10/01/2020    PHOS 2.9 05/01/2014     Results from last 7 days   Lab Units 10/01/20  0710 09/30/20  0702 09/29/20 2004 09/29/20  1755   SODIUM mmol/L 140 142  --  141   POTASSIUM mmol/L 3.9 4.1  --  3.2*   CHLORIDE mmol/L 103 108*  --  105   CO2 mmol/L 27.4 21.9*  --  22.9   BUN mg/dL 18 16  --  16   CREATININE mg/dL 0.90 0.84  --  1.00   GLUCOSE mg/dL 189* 235*  --  131*   CALCIUM mg/dL 8.5* 8.3*  --  9.1   WBC 10*3/mm3 23.36*  --   --  24.44*   HEMOGLOBIN g/dL 11.3*  --   --  12.7   PLATELETS 10*3/mm3 145  --   --  158   ALT (SGPT) U/L 19  --   --  23   AST (SGOT) U/L 16  --   --  17   PROBNP pg/mL  --   --   --  535.5   PROCALCITONIN ng/mL  --  " 0.06 0.05  --      Lab Results   Component Value Date    CKTOTAL 91 12/09/2017    TROPONINT <0.010 09/29/2020     Results from last 7 days   Lab Units 09/29/20 2004 09/29/20 1755   TROPONIN T ng/mL <0.010 <0.010     Results from last 7 days   Lab Units 09/29/20 2004 09/29/20  1755   BLOODCX  No growth at 2 days No growth at 2 days     Results from last 7 days   Lab Units 09/30/20  0702 09/29/20 2004 09/29/20  1755   PROCALCITONIN ng/mL 0.06 0.05  --    LACTATE mmol/L  --   --  1.0     Results from last 7 days   Lab Units 09/29/20  1801   MODALITY  Room Air   O2 SATURATION CALC % 77.8*     Results from last 7 days   Lab Units 10/01/20  1558   ADENOVIRUS DETECTION BY PCR  Not Detected   CORONAVIRUS 229E  Not Detected   CORONAVIRUS HKU1  Not Detected   CORONAVIRUS NL63  Not Detected   CORONAVIRUS OC43  Not Detected   HUMAN METAPNEUMOVIRUS  Not Detected   HUMAN RHINOVIRUS/ENTEROVIRUS  Detected*   INFLUENZA B PCR  Not Detected   PARAINFLUENZA 1  Not Detected   PARAINFLUENZA VIRUS 2  Not Detected   PARAINFLUENZA VIRUS 3  Not Detected   PARAINFLUENZA VIRUS 4  Not Detected   BORDETELLA PERTUSSIS PCR  Not Detected   BORDETELLA PARAPERTUSSIS PCR  Not Detected   [id number]  Not Detected   CHLAMYDOPHILA PNEUMONIAE PCR  Not Detected   MYCOPLAMA PNEUMO PCR  Not Detected   INFLUENZA A H3  Not Detected   INFLUENZA A H1  Not Detected   RSV, PCR  Not Detected     Results from last 7 days   Lab Units 10/01/20  0710 09/30/20  0702 09/29/20  1755   INR  3.60* 3.78* 3.41*     Results from last 7 days   Lab Units 09/29/20 2004 09/29/20  1755   BLOODCX  No growth at 2 days No growth at 2 days     Lab Results   Component Value Date    TSH 0.801 09/30/2020     Estimated Creatinine Clearance: 49.3 mL/min (by C-G formula based on SCr of 0.9 mg/dL).         Imaging: I personally visualized the images of scans/x-rays performed within last 3 days.      Assessment:  Chronic bronchitis (CMS/HCC)  COPD/asthma  Acute hypoxemic respiratory  failure  Chronically elevated left hemidiaphragm  Ischemic cardiomyopathy  Diastolic dysfunction  Mitral regurgitation  A. fib/sick sinus syndrome  CLL  Pulmonary hypertension WHO group 2  Immunodeficiency  Hypertension diabetes mellitus CHARLENE on BiPAP      Recommendations:  At this point we have incredibly complicated female follows my partner Dr. Coburn.  She has multiple issues including the chronic ones include COPD asthma with chronically elevated left hemidiaphragm.  She has had some chronic cough with chronic sputum production.  CT chest was reviewed and I do not see any acute infiltrate as such.  She does have leukocytosis but negative pro calcitonin.  I am not impressed by her CT chest to suggest that she has active pneumonia going.  I suspect she has chronic bronchitis with chronic mucopurulent secretions ongoing.  I would guide if any antibiotic treatment is initiated treatment based on sputum cultures and we will follow-up on that.  Currently she seems to be on Keflex and I would not recommend escalating antibiotic therapy.  Oxygen to keep sats above 90%  Aggressive pulmonary toilet  Keep volume status as euvolemic as possible  Home BiPAP once available  We will follow closely.              Dania La MD  10/1/2020  20:16 EDT      Much of this encounter note is an electronic transcription/translation of spoken language to printed text using Dragon Software.

## 2020-10-02 NOTE — H&P
"DAILY PROGRESS NOTE  [facility]    Patient Identification:  Name: Caitlyn Longoria  Age: 85 y.o.  Sex: female  :  1934  MRN: [id number]         Primary Care Physician: Zenaida Suarez MD    Subjective:  Interval History:Short of air on 2 L O 2 .    Objective:    Scheduled Meds:aspirin, 81 mg, Oral, Daily  budesonide-formoterol, 2 puff, Inhalation, BID - RT  carvedilol, 3.125 mg, Oral, BID  fluticasone, 1 spray, Nasal, BID  furosemide, 20 mg, Oral, QAM  glipizide, 2.5 mg, Oral, QAM  insulin lispro, 0-14 Units, Subcutaneous, TID AC  ipratropium-albuterol, 3 mL, Nebulization, 4x Daily - RT  losartan, 25 mg, Oral, Daily  montelukast, 10 mg, Oral, Nightly  oxazepam, 10 mg, Oral, Nightly  oxybutynin XL, 10 mg, Oral, Nightly  pantoprazole, 40 mg, Oral, Daily  piperacillin-tazobactam, 3.375 g, Intravenous, Q8H  rosuvastatin, 20 mg, Oral, Nightly  sodium chloride, 10 mL, Intravenous, Q12H  sodium chloride, 4 mL, Nebulization, BID  warfarin (COUMADIN) (dosing per levels), , Does not apply, Daily  warfarin, 2 mg, Oral, Once      Continuous Infusions:Pharmacy to dose warfarin,         Vital signs in last 24 hours:  Temp:  [97.2 °F (36.2 °C)-97.5 °F (36.4 °C)] 97.2 °F (36.2 °C)  Heart Rate:  [77-81] 80  Resp:  [18-20] 18  BP: (105-119)/(63-78) 110/69    Intake/Output:    Intake/Output Summary (Last 24 hours) at 10/2/2020 1333  Last data filed at 10/2/2020 0900  Gross per 24 hour   Intake 920 ml   Output --   Net 920 ml       Exam:  /69 (BP Location: Left arm, Patient Position: Sitting)   Pulse 80   Temp 97.2 °F (36.2 °C) (Oral)   Resp 18   Ht 157.5 cm (62\")   Wt 95.7 kg (211 lb)   SpO2 97%   BMI 38.59 kg/m²     General Appearance:    Alert, cooperative, no distress   Head:    Normocephalic, without obvious abnormality, atraumatic   Eyes:       Throat:   Lips, tongue, gums normal   Neck:   Supple, symmetrical, trachea midline, no JVD   Lungs:     Rhonchi and wheezes. bilaterally, " respirations unlabored   Chest Wall:    No tenderness or deformity    Heart:    Regular rate and rhythm, S1 and S2 normal, no murmur,no  Rub or gallop   Abdomen:     Soft, nontender, bowel sounds active, no masses, no organomegaly    Extremities:   Extremities normal, atraumatic, no cyanosis or edema   Pulses:      Skin:   Skin is warm and dry,  no rashes or palpable lesions   Neurologic:   no focal deficits noted      Lab Results (last 72 hours)     Procedure Component Value Units Date/Time    POC Glucose Once [[id number]]  (Abnormal) Collected: 09/30/20 1557    Specimen: Blood Updated: 09/30/20 1559     Glucose 358 mg/dL     POC Glucose Once [[id number]]  (Abnormal) Collected: 09/30/20 1158    Specimen: Blood Updated: 09/30/20 1205     Glucose 366 mg/dL     COVID PRE-OP / PRE-PROCEDURE SCREENING ORDER (NO ISOLATION) - Swab, Nasopharynx [[id number]] Collected: 09/29/20 1819    Specimen: Swab from Nasopharynx Updated: 09/30/20 1135    Narrative:      The following orders were created for panel order COVID PRE-OP / PRE-PROCEDURE SCREENING ORDER (NO ISOLATION) - Swab, Nasopharynx.  Procedure                               Abnormality         Status                     ---------                               -----------         ------                     COVID-19,BIOTAP, NP/OP S...[[id number]]                      Final result                 Please view results for these tests on the individual orders.    COVID-19,BIOTAP, NP/OP SWAB IN TRANSPORT MEDIA OR SALINE 24-36 HR TAT - Swab, Nasopharynx [[id number]] Collected: 09/29/20 1819    Specimen: Swab from Nasopharynx Updated: 09/30/20 1135     SARS-CoV-2 PCR Not Detected     Comment: Nucleic acid specific to SARS-CoV-2 (COVID-19) virus was not detected inthis sample by the TaqPath (TM) COVID-19 Combo Kit.SARS-CoV-2 (COVID-19) nucleic acid testing performed using [employer] Aptima (R) SARS-CoV-2 Assay or [employer] TaqPath   (TM)COVID-19 Combo Kit as indicated above  "under Emergency Use Authorization (EUA) from the FDA. Aptima (R) and TaqPath (TM) test performancecharacteristics verified by [facility] in accordance with the FDAs Guidance Document (Policy for Diagnostic   Tests for Coronavirus Disease-2019during the Public Health Emergency) issued on March 16, 2020. The laboratory is regulated under CLIA as qualified to perform high-complexity testingUnless otherwise noted, all testing was performed at [facility],   CLIA No. [id number], [place] State Licensee No. [id number]       Respiratory Culture - Sputum, Cough [[id number]] Collected: 09/30/20 1114    Specimen: Sputum from Cough Updated: 09/30/20 1123    Procalcitonin [[id number]]  (Normal) Collected: 09/30/20 0702    Specimen: Blood from Hand, Left Updated: 09/30/20 0810     Procalcitonin 0.06 ng/mL     Narrative:      As a Marker for Sepsis (Non-Neonates):   1. <0.5 ng/mL represents a low risk of severe sepsis and/or septic shock.  1. >2 ng/mL represents a high risk of severe sepsis and/or septic shock.    As a Marker for Lower Respiratory Tract Infections that require antibiotic therapy:  PCT on Admission     Antibiotic Therapy             6-12 Hrs later  > 0.5                Strongly Recommended            >0.25 - <0.5         Recommended  0.1 - 0.25           Discouraged                   Remeasure/reassess PCT  <0.1                 Strongly Discouraged          Remeasure/reassess PCT      As 28 day mortality risk marker: \"Change in Procalcitonin Result\" (> 80 % or <=80 %) if Day 0 (or Day 1) and Day 4 values are available. Refer to http://[email or web address]-pct-[email or web address]/   Change in PCT <=80 %   A decrease of PCT levels below or equal to 80 % defines a positive change in PCT test result representing a higher risk for 28-day all-cause mortality of patients diagnosed with severe sepsis or septic shock.  Change in PCT > 80 %   A decrease of PCT levels of more than 80 % defines a negative change in PCT result representing a " "lower risk for 28-day all-cause mortality of patients diagnosed with severe sepsis or septic shock.                Results may be falsely decreased if patient taking Biotin.     TSH [[id number]]  (Normal) Collected: 09/30/20 0702    Specimen: Blood from Hand, Left Updated: 09/30/20 0805     TSH 0.801 uIU/mL     Basic Metabolic Panel [[id number]]  (Abnormal) Collected: 09/30/20 0702    Specimen: Blood from Hand, Left Updated: 09/30/20 0803     Glucose 235 mg/dL      BUN 16 mg/dL      Creatinine 0.84 mg/dL      Sodium 142 mmol/L      Potassium 4.1 mmol/L      Chloride 108 mmol/L      CO2 21.9 mmol/L      Calcium 8.3 mg/dL      eGFR Non African Amer 64 mL/min/1.73      BUN/Creatinine Ratio 19.0     Anion Gap 12.1 mmol/L     Narrative:      GFR Normal >60  Chronic Kidney Disease <60  Kidney Failure <15      Protime-INR [[id number]]  (Abnormal) Collected: 09/30/20 0702    Specimen: Blood from Hand, Left Updated: 09/30/20 0741     Protime 35.9 Seconds      INR 3.78    Procalcitonin [[id number]]  (Normal) Collected: 09/29/20 2004    Specimen: Blood Updated: 09/30/20 0024     Procalcitonin 0.05 ng/mL     Narrative:      As a Marker for Sepsis (Non-Neonates):   1. <0.5 ng/mL represents a low risk of severe sepsis and/or septic shock.  1. >2 ng/mL represents a high risk of severe sepsis and/or septic shock.    As a Marker for Lower Respiratory Tract Infections that require antibiotic therapy:  PCT on Admission     Antibiotic Therapy             6-12 Hrs later  > 0.5                Strongly Recommended            >0.25 - <0.5         Recommended  0.1 - 0.25           Discouraged                   Remeasure/reassess PCT  <0.1                 Strongly Discouraged          Remeasure/reassess PCT      As 28 day mortality risk marker: \"Change in Procalcitonin Result\" (> 80 % or <=80 %) if Day 0 (or Day 1) and Day 4 values are available. Refer to http://www.[employer]-pct-calculator.com/   Change in PCT <=80 %   A decrease of PCT levels " below or equal to 80 % defines a positive change in PCT test result representing a higher risk for 28-day all-cause mortality of patients diagnosed with severe sepsis or septic shock.  Change in PCT > 80 %   A decrease of PCT levels of more than 80 % defines a negative change in PCT result representing a lower risk for 28-day all-cause mortality of patients diagnosed with severe sepsis or septic shock.                Results may be falsely decreased if patient taking Biotin.     POC Glucose Once [[id number]]  (Normal) Collected: 09/30/20 0006    Specimen: Blood Updated: 09/30/20 0007     Glucose 118 mg/dL     Troponin [[id number]]  (Normal) Collected: 09/29/20 2004    Specimen: Blood Updated: 09/29/20 2041     Troponin T <0.010 ng/mL     Narrative:      Troponin T Reference Range:  <= 0.03 ng/mL-   Negative for AMI  >0.03 ng/mL-     Abnormal for myocardial necrosis.  Clinicians would have to utilize clinical acumen, EKG, Troponin and serial changes to determine if it is an Acute Myocardial Infarction or myocardial injury due to an underlying chronic condition.       Results may be falsely decreased if patient taking Biotin.      Blood Culture - Blood, Arm, Left [[id number]] Collected: 09/29/20 2004    Specimen: Blood from Arm, Left Updated: 09/29/20 2012    CBC & Differential [[id number]]  (Abnormal) Collected: 09/29/20 1755    Specimen: Blood Updated: 09/29/20 1928    Narrative:      The following orders were created for panel order CBC & Differential.  Procedure                               Abnormality         Status                     ---------                               -----------         ------                     CBC Auto Differential[[id number]]        Abnormal            Final result                 Please view results for these tests on the individual orders.    CBC Auto Differential [[id number]]  (Abnormal) Collected: 09/29/20 1755    Specimen: Blood Updated: 09/29/20 1928     WBC 24.44 10*3/mm3      RBC  4.22 10*6/mm3      Hemoglobin 12.7 g/dL      Hematocrit 39.2 %      MCV 92.9 fL      MCH 30.1 pg      MCHC 32.4 g/dL      RDW 12.7 %      RDW-SD 42.6 fl      MPV 10.0 fL      Platelets 158 10*3/mm3     Manual Differential [[id number]]  (Abnormal) Collected: 09/29/20 1755    Specimen: Blood Updated: 09/29/20 1928     Neutrophil % 28.0 %      Lymphocyte % 70.0 %      Monocyte % 1.0 %      Basophil % 1.0 %      Neutrophils Absolute 6.84 10*3/mm3      Lymphocytes Absolute 17.11 10*3/mm3      Monocytes Absolute 0.24 10*3/mm3      Basophils Absolute 0.24 10*3/mm3      RBC Morphology Normal     Smudge Cells Mod/2+     Platelet Morphology Normal    BNP [[id number]]  (Normal) Collected: 09/29/20 1755    Specimen: Blood Updated: 09/29/20 1847     proBNP 535.5 pg/mL     Narrative:      Among patients with dyspnea, NT-proBNP is highly sensitive for the detection of acute congestive heart failure. In addition NT-proBNP of <300 pg/ml effectively rules out acute congestive heart failure with 99% negative predictive value.    Results may be falsely decreased if patient taking Biotin.      Troponin [[id number]]  (Normal) Collected: 09/29/20 1755    Specimen: Blood Updated: 09/29/20 1847     Troponin T <0.010 ng/mL     Narrative:      Troponin T Reference Range:  <= 0.03 ng/mL-   Negative for AMI  >0.03 ng/mL-     Abnormal for myocardial necrosis.  Clinicians would have to utilize clinical acumen, EKG, Troponin and serial changes to determine if it is an Acute Myocardial Infarction or myocardial injury due to an underlying chronic condition.       Results may be falsely decreased if patient taking Biotin.      Comprehensive Metabolic Panel [[id number]]  (Abnormal) Collected: 09/29/20 1755    Specimen: Blood Updated: 09/29/20 1843     Glucose 131 mg/dL      BUN 16 mg/dL      Creatinine 1.00 mg/dL      Sodium 141 mmol/L      Potassium 3.2 mmol/L      Chloride 105 mmol/L      CO2 22.9 mmol/L      Calcium 9.1 mg/dL      Total Protein 6.4  g/dL      Albumin 3.90 g/dL      ALT (SGPT) 23 U/L      AST (SGOT) 17 U/L      Alkaline Phosphatase 105 U/L      Total Bilirubin 0.8 mg/dL      eGFR Non African Amer 53 mL/min/1.73      Globulin 2.5 gm/dL      A/G Ratio 1.6 g/dL      BUN/Creatinine Ratio 16.0     Anion Gap 13.1 mmol/L     Narrative:      GFR Normal >60  Chronic Kidney Disease <60  Kidney Failure <15      Lactic Acid, Plasma [[id number]]  (Normal) Collected: 09/29/20 1755    Specimen: Blood Updated: 09/29/20 1831     Lactate 1.0 mmol/L     D-dimer, Quantitative [[id number]]  (Abnormal) Collected: 09/29/20 1755    Specimen: Blood Updated: 09/29/20 1829     D-Dimer, Quantitative 0.66 MCGFEU/mL     Narrative:      The [employer] D-Dimer test used in conjunction with a clinical pretest probability (PTP) assessment model, has been approved by the FDA to rule out the presence of venous thromboembolism (VTE) in outpatients suspected of deep venous thrombosis (DVT) or pulmonary embolism (PE). The cut-off for negative predictive value is <0.50 MCGFEU/mL.    Protime-INR [[id number]]  (Abnormal) Collected: 09/29/20 1755    Specimen: Blood Updated: 09/29/20 1829     Protime 33.2 Seconds      INR 3.41    Blood Culture - Blood, Arm, Left [[id number]] Collected: 09/29/20 1755    Specimen: Blood from Arm, Left Updated: 09/29/20 1812    Blood Gas, Venous [[id number]]  (Abnormal) Collected: 09/29/20 1801    Specimen: Venous Blood Updated: 09/29/20 1804     Site RV     pH, Venous 7.431 pH Units      pCO2, Venous 34.2 mm Hg      pO2, Venous 40.6 mm Hg      HCO3, Venous 22.7 mmol/L      Base Excess, Venous -1.1 mmol/L      O2 Saturation Calculated 77.8 %      Barometric Pressure for Blood Gas 744.9 mmHg      Modality Room Air     Rate 26 Breaths/minute         Data Review:  Results from last 7 days   Lab Units 10/02/20  0747 10/01/20  0710 09/30/20  0702   SODIUM mmol/L 139 140 142   POTASSIUM mmol/L 3.7 3.9 4.1   CHLORIDE mmol/L 101 103 108*   CO2 mmol/L 31.1* 27.4 21.9*   BUN  mg/dL 25* 18 16   CREATININE mg/dL 0.87 0.90 0.84   GLUCOSE mg/dL 148* 189* 235*   CALCIUM mg/dL 8.4* 8.5* 8.3*     Results from last 7 days   Lab Units 10/02/20  0747 10/01/20  0710 09/29/20  1755   WBC 10*3/mm3 22.64* 23.36* 24.44*   HEMOGLOBIN g/dL 12.4 11.3* 12.7   HEMATOCRIT % 38.8 35.1 39.2   PLATELETS 10*3/mm3 158 145 158     Results from last 7 days   Lab Units 09/30/20  0702   TSH uIU/mL 0.801         Lab Results   Lab Value Date/Time    TROPONINT <0.010 09/29/2020 2004    TROPONINT <0.010 09/29/2020 1755    TROPONINT <0.010 08/29/2020 1036    TROPONINT <0.010 04/28/2020 1421    TROPONINT <0.010 04/23/2020 2143    TROPONINT 0.014 02/11/2020 1748    TROPONINT <0.010 09/16/2019 1207    TROPONINT 0.12 (C) 03/27/2014 0955    TROPONINT 0.14 (C) 03/27/2014 0355    TROPONINT 0.14 (C) 03/26/2014 1755    TROPONINT 0.15 (C) 03/26/2014 0349         Results from last 7 days   Lab Units 10/02/20  0747 10/01/20  0710 09/29/20  1755   ALK PHOS U/L 106 91 105   BILIRUBIN mg/dL 0.5 0.4 0.8   ALT (SGPT) U/L 25 19 23   AST (SGOT) U/L 22 16 17     Results from last 7 days   Lab Units 09/30/20  0702   TSH uIU/mL 0.801         Glucose   Date/Time Value Ref Range Status   10/02/2020 1131 168 (H) 70 - 130 mg/dL Final   10/02/2020 0619 131 (H) 70 - 130 mg/dL Final   10/01/2020 2119 136 (H) 70 - 130 mg/dL Final   10/01/2020 1622 130 70 - 130 mg/dL Final   10/01/2020 1155 137 (H) 70 - 130 mg/dL Final   09/30/2020 1557 358 (H) 70 - 130 mg/dL Final   09/30/2020 1158 366 (H) 70 - 130 mg/dL Final   09/30/2020 0006 118 70 - 130 mg/dL Final     Results from last 7 days   Lab Units 10/02/20  0747 10/01/20  0710 09/30/20  0702   INR  2.31* 3.60* 3.78*       Past Medical History:   Diagnosis Date   • Aortic calcification (CMS/HCC)     mild, on echo 12/17/2017   • Aortic regurgitation     Trace   • Asthma    • Atrial fibrillation (CMS/HCC)    • CAD (coronary artery disease)    • Chronic combined systolic and diastolic congestive heart failure  (CMS/[facility])    • CKD (chronic kidney disease) stage 3, GFR 30-59 ml/min    • Coronary artery disease involving native coronary artery of native heart with angina pectoris (CMS/[facility])    • Disc degeneration, lumbar    • DM type 2 (diabetes mellitus, type 2) (CMS/HCC)    • GERD (gastroesophageal reflux disease)    • History of aneurysm     right femoral artery s/p LHC   • History of blood transfusion    • History of fracture    • History of vitamin D deficiency    • Hyperlipidemia    • Hypertension    • Mild mitral regurgitation    • Mitral annular calcification     12/8/2017- echo, moderate   • PAF (paroxysmal atrial fibrillation) (CMS/[facility])    • Peripheral neuropathy    • Skin cancer     Left hand   • Sleep apnea    • SSS (sick sinus syndrome) (CMS/[facility])    • Stroke (cerebrum) (CMS/[facility])    • Tricuspid regurgitation     Trace       Assessment:  Active Hospital Problems    Diagnosis  POA   • **Chronic bronchitis (CMS/[facility]) [J42]  Yes   • Closed wedge compression fracture of T5 vertebra (CMS/[facility]) [S22.050A]  Unknown   • Dyspnea [R06.00]  Yes   • Anticoagulated on Coumadin [Z79.01]  Not Applicable   • CLL (chronic lymphoid leukemia) in relapse (CMS/[facility]) [C91.92]  Yes   • Rhinovirus [B34.8]  Yes   • Morbid obesity (CMS/[facility]) [E66.01]  Yes   • HTN (hypertension) [I10]  Yes   • CKD (chronic kidney disease), stage III [N18.30]  Yes   • DM II (diabetes mellitus, type II), controlled (CMS/[facility]) [E11.9]  Yes   • Paroxysmal atrial fibrillation (CMS/[facility]) [I48.0]  Yes      Resolved Hospital Problems   No resolved problems to display.       Plan:  Continue the nebs and O 2, await pulmonary. Follow lab.  Antibiotics for PNA    Saman Cruz MD  10/2/2020  13:33 EDT

## 2020-10-02 NOTE — PROGRESS NOTES
LOS: 3 days     Chief Complaint:  Pneumonia    Interval History:  Afebrile, patient states she feels a lot better.  Feels that her breathing is better.  Continues to report back pain.  Denies any abdominal pain nausea vomiting or diarrhea.  No rashes    Vital Signs  Temp:  [97.2 °F (36.2 °C)-97.5 °F (36.4 °C)] 97.2 °F (36.2 °C)  Heart Rate:  [77-81] 79  Resp:  [18-20] 20  BP: (105-119)/(63-78) 110/69  95% on 2L NC    Physical Exam:  General: In no acute distress  Cardiovascular: RRR, + LE edema   Respiratory: upper airway sounds  GI: Soft, NT/ND, + bowel sounds bilaterally  Skin: No rashes     Antibiotics:  Zosyn 3.375g IV q8hrs     Results Review:    Lab Results   Component Value Date    WBC 22.64 (H) 10/02/2020    HGB 12.4 10/02/2020    HCT 38.8 10/02/2020    MCV 94.4 10/02/2020     10/02/2020     Lab Results   Component Value Date    GLUCOSE 148 (H) 10/02/2020    BUN 25 (H) 10/02/2020    CREATININE 0.87 10/02/2020    EGFRIFNONA 62 10/02/2020    BCR 28.7 (H) 10/02/2020    CO2 31.1 (H) 10/02/2020    CALCIUM 8.4 (L) 10/02/2020    PROTENTOTREF 4.9 (L) 04/08/2014    ALBUMIN 3.80 10/02/2020    LABIL2 1.6 05/26/2020    AST 22 10/02/2020    ALT 25 10/02/2020     Procalcitonin 0.05 -> 0.06 -> 0.07    Microbiology:  9/30 SCx pseudomonas   9/29 BCx NGTD x 2  9/29 COVID neg    Assessment/Plan   Acute on chronic COPD asthma exacerbation  CLL  Low immunoglobulin levels  Possible right-sided pneumonia  Recurrent rhinovirus/enterovirus infection  Chronically elevated partially paralyzed left hemidiaphragm  Secondary pulmonary hypertension  Type 2 diabetes  Obstructive sleep apnea  Morbid obesity    I initially started her on Zosyn when her sputum culture came back positive for Pseudomonas but now I believe this represents colonization as the patient states her breathing is a lot better today and her procalcitonin remains negative.  Her CAT scan of the chest is not impressive for pneumonia.  At this time I will  discontinue Zosyn and start her back on her suppressive Keflex.  We will monitor for any signs or symptoms concerning for infection.    Patient once again tested positive for rhinovirus/enterovirus.  She has had a persistently positive test since February and I doubt this is new infection or reinfection.  We have sometimes seeing this persistent shedding and immunocompromise patients.  I am not sure that IVIG will help but it certainly worth a try.    Continue supportive care

## 2020-10-02 NOTE — PLAN OF CARE
Problem: Adult Inpatient Plan of Care  Goal: Plan of Care Review  Recent Flowsheet Documentation  Taken 10/2/2020 1713 by Jaylin Saunders PTA  Progress: improving  Plan of Care Reviewed With: patient  Outcome Summary:   pt hu cao and migdalia, given extra time for all activity   she has her own routine   extensive educ on donning/doffing back brace-written istructions per pt request so she can remember once home  Patient was wearing a face mask during this therapy encounter. Therapist used appropriate personal protective equipment including eye protection, mask, and gloves.  Mask used was standard procedure mask. Appropriate PPE was worn during the entire therapy session. Hand hygiene was completed before and after therapy session. Patient is not in enhanced droplet precautions.

## 2020-10-03 LAB
ANION GAP SERPL CALCULATED.3IONS-SCNC: 10.4 MMOL/L (ref 5–15)
BUN SERPL-MCNC: 24 MG/DL (ref 8–23)
BUN/CREAT SERPL: 26.7 (ref 7–25)
CALCIUM SPEC-SCNC: 8.6 MG/DL (ref 8.6–10.5)
CHLORIDE SERPL-SCNC: 100 MMOL/L (ref 98–107)
CO2 SERPL-SCNC: 29.6 MMOL/L (ref 22–29)
CREAT SERPL-MCNC: 0.9 MG/DL (ref 0.57–1)
DEPRECATED RDW RBC AUTO: 44.8 FL (ref 37–54)
ERYTHROCYTE [DISTWIDTH] IN BLOOD BY AUTOMATED COUNT: 12.4 % (ref 12.3–15.4)
GFR SERPL CREATININE-BSD FRML MDRD: 60 ML/MIN/1.73
GLUCOSE BLDC GLUCOMTR-MCNC: 140 MG/DL (ref 70–130)
GLUCOSE BLDC GLUCOMTR-MCNC: 153 MG/DL (ref 70–130)
GLUCOSE BLDC GLUCOMTR-MCNC: 153 MG/DL (ref 70–130)
GLUCOSE BLDC GLUCOMTR-MCNC: 160 MG/DL (ref 70–130)
GLUCOSE SERPL-MCNC: 155 MG/DL (ref 65–99)
HCT VFR BLD AUTO: 39.1 % (ref 34–46.6)
HGB BLD-MCNC: 12 G/DL (ref 12–15.9)
INR PPP: 1.98 (ref 0.9–1.1)
LYMPHOCYTES # BLD MANUAL: 15.71 10*3/MM3 (ref 0.7–3.1)
LYMPHOCYTES NFR BLD MANUAL: 80 % (ref 19.6–45.3)
MCH RBC QN AUTO: 29.9 PG (ref 26.6–33)
MCHC RBC AUTO-ENTMCNC: 30.7 G/DL (ref 31.5–35.7)
MCV RBC AUTO: 97.5 FL (ref 79–97)
NEUTROPHILS # BLD AUTO: 3.93 10*3/MM3 (ref 1.7–7)
NEUTROPHILS NFR BLD MANUAL: 20 % (ref 42.7–76)
PLAT MORPH BLD: NORMAL
PLATELET # BLD AUTO: 162 10*3/MM3 (ref 140–450)
PMV BLD AUTO: 9.9 FL (ref 6–12)
POTASSIUM SERPL-SCNC: 4.1 MMOL/L (ref 3.5–5.2)
PROTHROMBIN TIME: 22 SECONDS (ref 11.7–14.2)
RBC # BLD AUTO: 4.01 10*6/MM3 (ref 3.77–5.28)
RBC MORPH BLD: NORMAL
SMUDGE CELLS BLD QL SMEAR: ABNORMAL
SODIUM SERPL-SCNC: 140 MMOL/L (ref 136–145)
WBC # BLD AUTO: 19.64 10*3/MM3 (ref 3.4–10.8)

## 2020-10-03 PROCEDURE — 94799 UNLISTED PULMONARY SVC/PX: CPT

## 2020-10-03 PROCEDURE — 82962 GLUCOSE BLOOD TEST: CPT

## 2020-10-03 PROCEDURE — 63710000001 TOBRAMYCIN PER 300 MG: Performed by: INTERNAL MEDICINE

## 2020-10-03 PROCEDURE — 80048 BASIC METABOLIC PNL TOTAL CA: CPT | Performed by: HOSPITALIST

## 2020-10-03 PROCEDURE — 85025 COMPLETE CBC W/AUTO DIFF WBC: CPT | Performed by: HOSPITALIST

## 2020-10-03 PROCEDURE — 97110 THERAPEUTIC EXERCISES: CPT

## 2020-10-03 PROCEDURE — 94640 AIRWAY INHALATION TREATMENT: CPT

## 2020-10-03 PROCEDURE — 85007 BL SMEAR W/DIFF WBC COUNT: CPT | Performed by: HOSPITALIST

## 2020-10-03 PROCEDURE — 99232 SBSQ HOSP IP/OBS MODERATE 35: CPT | Performed by: INTERNAL MEDICINE

## 2020-10-03 PROCEDURE — 85610 PROTHROMBIN TIME: CPT | Performed by: HOSPITALIST

## 2020-10-03 RX ORDER — WARFARIN SODIUM 3 MG/1
3 TABLET ORAL
Status: COMPLETED | OUTPATIENT
Start: 2020-10-03 | End: 2020-10-03

## 2020-10-03 RX ADMIN — WARFARIN 3 MG: 3 TABLET ORAL at 18:19

## 2020-10-03 RX ADMIN — CEPHALEXIN 500 MG: 500 CAPSULE ORAL at 18:18

## 2020-10-03 RX ADMIN — HYDROCODONE BITARTRATE AND ACETAMINOPHEN 1 TABLET: 5; 325 TABLET ORAL at 14:23

## 2020-10-03 RX ADMIN — MONTELUKAST SODIUM 10 MG: 10 TABLET, FILM COATED ORAL at 20:27

## 2020-10-03 RX ADMIN — OXAZEPAM 10 MG: 10 CAPSULE, GELATIN COATED ORAL at 20:27

## 2020-10-03 RX ADMIN — Medication 4 ML: at 07:51

## 2020-10-03 RX ADMIN — IPRATROPIUM BROMIDE AND ALBUTEROL SULFATE 3 ML: 2.5; .5 SOLUTION RESPIRATORY (INHALATION) at 21:19

## 2020-10-03 RX ADMIN — FUROSEMIDE 20 MG: 20 TABLET ORAL at 07:19

## 2020-10-03 RX ADMIN — GLIPIZIDE 2.5 MG: 5 TABLET ORAL at 08:49

## 2020-10-03 RX ADMIN — CARVEDILOL 3.12 MG: 3.12 TABLET, FILM COATED ORAL at 08:49

## 2020-10-03 RX ADMIN — CEPHALEXIN 500 MG: 500 CAPSULE ORAL at 20:27

## 2020-10-03 RX ADMIN — ROSUVASTATIN CALCIUM 20 MG: 20 TABLET, FILM COATED ORAL at 20:27

## 2020-10-03 RX ADMIN — LOSARTAN POTASSIUM 25 MG: 25 TABLET, FILM COATED ORAL at 08:49

## 2020-10-03 RX ADMIN — TOBRAMYCIN 300 MG: 1.2 INJECTION, POWDER, LYOPHILIZED, FOR SOLUTION INTRAVENOUS at 21:29

## 2020-10-03 RX ADMIN — SODIUM CHLORIDE, PRESERVATIVE FREE 10 ML: 5 INJECTION INTRAVENOUS at 08:41

## 2020-10-03 RX ADMIN — PANTOPRAZOLE SODIUM 40 MG: 40 TABLET, DELAYED RELEASE ORAL at 08:49

## 2020-10-03 RX ADMIN — FLUTICASONE PROPIONATE 1 SPRAY: 50 SPRAY, METERED NASAL at 20:27

## 2020-10-03 RX ADMIN — HYDROCODONE BITARTRATE AND ACETAMINOPHEN 1 TABLET: 5; 325 TABLET ORAL at 23:03

## 2020-10-03 RX ADMIN — IPRATROPIUM BROMIDE AND ALBUTEROL SULFATE 3 ML: 2.5; .5 SOLUTION RESPIRATORY (INHALATION) at 14:33

## 2020-10-03 RX ADMIN — IPRATROPIUM BROMIDE AND ALBUTEROL SULFATE 3 ML: 2.5; .5 SOLUTION RESPIRATORY (INHALATION) at 07:51

## 2020-10-03 RX ADMIN — OXYBUTYNIN CHLORIDE 10 MG: 10 TABLET, EXTENDED RELEASE ORAL at 20:27

## 2020-10-03 RX ADMIN — ASPIRIN 81 MG: 81 TABLET, COATED ORAL at 08:49

## 2020-10-03 RX ADMIN — CARVEDILOL 3.12 MG: 3.12 TABLET, FILM COATED ORAL at 20:27

## 2020-10-03 RX ADMIN — FLUTICASONE PROPIONATE 1 SPRAY: 50 SPRAY, METERED NASAL at 08:52

## 2020-10-03 RX ADMIN — CEPHALEXIN 500 MG: 500 CAPSULE ORAL at 08:49

## 2020-10-03 RX ADMIN — SODIUM CHLORIDE, PRESERVATIVE FREE 10 ML: 5 INJECTION INTRAVENOUS at 20:27

## 2020-10-03 NOTE — PROGRESS NOTES
"  PROGRESS NOTE  Patient Name: Caitlyn Longoria  Age/Sex: 85 y.o. female  : 1934  MRN: [id number]    Date of Admission: 2020  Date of Encounter Visit: 10/03/20   LOS: 4 days   Patient Care Team:  Zenaida Suarez MD as PCP - General (Internal Medicine)  Juno Coburn MD as PCP - Claims Attributed  Pao Levi [facility] as Pharmacist  Alexander Valiente [facility] as Pharmacist (Pharmacy)  Zenaida Suarez MD as Referring Physician (Internal Medicine)  Lucien Larkin MD as Consulting Physician (Hematology and Oncology)    Chief Complaint: Improving cough and dyspnea    Hospital course: Patient had heavy growth of Pseudomonas on her sputum culture, had evidence of pneumonia on the CAT scan but did very well within short.  After starting the Zosyn.  She is immunocompromised with immunoglobulin deficiency and she is supposed to get IVIG    Interval History: Afebrile, less cough and less wheezing and less shortness of breath    REVIEW OF SYSTEMS:   CONSTITUTIONAL: no fever or chills  CARDIOVASCULAR: No chest pain, chest pressure or chest discomfort. No palpitations or edema.   RESPIRATORY: Improving dyspnea, no productive cough  GASTROINTESTINAL: No anorexia, nausea, vomiting or diarrhea. No abdominal pain or blood.   HEMATOLOGIC: No bleeding or bruising.     Ventilator/Non-Invasive Ventilation Settings (From admission, onward)    None            Vital Signs  Temp:  [97.5 °F (36.4 °C)-98 °F (36.7 °C)] 98 °F (36.7 °C)  Heart Rate:  [78-80] 79  Resp:  [16-24] 22  BP: (119-149)/(70-89) 119/70  SpO2:  [95 %-100 %] 100 %  on  Flow (L/min):  [2] 2 Device (Oxygen Therapy): nasal cannula    Intake/Output Summary (Last 24 hours) at 10/3/2020 1211  Last data filed at 10/3/2020 0900  Gross per 24 hour   Intake 1080 ml   Output 1050 ml   Net 30 ml     Flowsheet Rows      First Filed Value   Admission Height  157.5 cm (62\") Documented at 2020 2223   Admission Weight  95.7 kg (211 lb) Documented at " 09/29/2020 2223        Body mass index is 38.59 kg/m².      09/29/20 2223   Weight: 95.7 kg (211 lb)       Physical Exam:  GEN:  No acute distress, alert, cooperative, well developed, on nasal cannula oxygen  EYES:   Sclerae clear. No icterus. PERRL. Normal EOM  ENT:   External ears/nose normal, no oral lesions, no thrush, mucous membranes moist  NECK:  Supple, midline trachea, no JVD  LUNGS: Normal chest on inspection, very minimal crackles, positive rhonchi.  The breathing is nonlabored.   CV:  Regular rhythm and rate. Normal S1/S2. No murmurs, gallops, or rubs noted.  ABD:  Soft, nontender and nondistended. Normal bowel sounds. No guarding  EXT:  Moves all extremities well. No cyanosis. No redness.  Positive edema.   Skin: Dry, intact, no bleeding    Results Review:    Results From Last 14 Days   Lab Units 09/29/20  1755   D DIMER QUANT MCGFEU/mL 0.66*   LACTATE mmol/L 1.0     Results from last 7 days   Lab Units 10/03/20  0830 10/02/20  0747 10/01/20  0710 09/30/20  0702 09/29/20  1755   SODIUM mmol/L 140 139 140 142 141   POTASSIUM mmol/L 4.1 3.7 3.9 4.1 3.2*   CHLORIDE mmol/L 100 101 103 108* 105   CO2 mmol/L 29.6* 31.1* 27.4 21.9* 22.9   BUN mg/dL 24* 25* 18 16 16   CREATININE mg/dL 0.90 0.87 0.90 0.84 1.00   CALCIUM mg/dL 8.6 8.4* 8.5* 8.3* 9.1   AST (SGOT) U/L  --  22 16  --  17   ALT (SGPT) U/L  --  25 19  --  23   ANION GAP mmol/L 10.4 6.9 9.6 12.1 13.1   ALBUMIN g/dL  --  3.80 3.50  --  3.90     Results from last 7 days   Lab Units 09/29/20 2004 09/29/20  1755   TROPONIN T ng/mL <0.010 <0.010   D DIMER QUANT MCGFEU/mL  --  0.66*     Results from last 7 days   Lab Units 09/30/20  0702   TSH uIU/mL 0.801     Results from last 7 days   Lab Units 09/29/20  1755   PROBNP pg/mL 535.5     Results from last 7 days   Lab Units 10/03/20  0830 10/02/20  0747 10/01/20  0710 09/29/20  1755   WBC 10*3/mm3 19.64* 22.64* 23.36* 24.44*   HEMOGLOBIN g/dL 12.0 12.4 11.3* 12.7   HEMATOCRIT % 39.1 38.8 35.1 39.2      PLATELETS 10*3/mm3 162 158 145 158   MCV fL 97.5* 94.4 94.1 92.9     Results from last 7 days   Lab Units 10/03/20  0830 10/02/20  0747 10/01/20  0710   INR  1.98* 2.31* 3.60*               Invalid input(s): LDLCALC          Glucose   Date/Time Value Ref Range Status   10/03/2020 0612 153 (H) 70 - 130 mg/dL Final   10/02/2020 2036 219 (H) 70 - 130 mg/dL Final   10/02/2020 1647 136 (H) 70 - 130 mg/dL Final   10/02/2020 1131 168 (H) 70 - 130 mg/dL Final   10/02/2020 0619 131 (H) 70 - 130 mg/dL Final   10/01/2020 2119 136 (H) 70 - 130 mg/dL Final   10/01/2020 1622 130 70 - 130 mg/dL Final   10/01/2020 1155 137 (H) 70 - 130 mg/dL Final     Results from last 7 days   Lab Units 10/02/20  0747 09/30/20  0702 09/29/20 2004 09/29/20  1755   PROCALCITONIN ng/mL 0.07 0.06 0.05  --    LACTATE mmol/L  --   --   --  1.0     Results from last 7 days   Lab Units 09/30/20  1114 09/29/20 2004 09/29/20  1755   BLOODCX   --  No growth at 3 days No growth at 3 days   RESPCX  Heavy growth (4+) Pseudomonas aeruginosa*  No Normal Respiratory Milana*  --   --          Results from last 7 days   Lab Units 10/01/20  1558   ADENOVIRUS DETECTION BY PCR  Not Detected   CORONAVIRUS 229E  Not Detected   CORONAVIRUS HKU1  Not Detected   CORONAVIRUS NL63  Not Detected   CORONAVIRUS OC43  Not Detected   HUMAN METAPNEUMOVIRUS  Not Detected   HUMAN RHINOVIRUS/ENTEROVIRUS  Detected*   INFLUENZA B PCR  Not Detected   PARAINFLUENZA 1  Not Detected   PARAINFLUENZA VIRUS 2  Not Detected   PARAINFLUENZA VIRUS 3  Not Detected   PARAINFLUENZA VIRUS 4  Not Detected   BORDETELLA PERTUSSIS PCR  Not Detected   BORDETELLA PARAPERTUSSIS PCR  Not Detected   [id number]  Not Detected   CHLAMYDOPHILA PNEUMONIAE PCR  Not Detected   MYCOPLAMA PNEUMO PCR  Not Detected   INFLUENZA A H3  Not Detected   INFLUENZA A H1  Not Detected   RSV, PCR  Not Detected               Imaging:   Imaging Results (All)     Procedure Component Value Units Date/Time    XR Spine Thoracic 2  View [[id number]] Collected: 09/30/20 1040     Updated: 09/30/20 1055    Narrative:      Thoracic spine radiograph     HISTORY:Mid thoracic spine pain after cough     TECHNIQUE: AP, lateral and swimmer's views of the thoracic spine     COMPARISON:CTA chest 09/29/2020 and 09/16/2019       Impression:      FINDINGS AND IMPRESSION:  Left-sided pacemaker is incompletely visualized.     Extensive bony demineralization limits evaluation. There is a  compression deformity of the T5 vertebral body, best seen on recent CTA  chest resulting in approximately 20% loss of height which is new since  09/16/2019. Findings are concerning for an acute fracture given patient  history cervical and however findings remain overall age-indeterminate  and correlation with patient history point tenderness in this area is  recommended with follow-up MRI for more exact characterization of  clinically indicated.     Multilevel moderate to severe degenerative changes are present within  the thoracic and visualized upper lumbar spine, best seen on recent CTA.     This report was finalized on 9/30/2020 10:52 AM by Dr. Alberto Mcfarland M.D.       CT Angiogram Chest [[id number]] Collected: 09/29/20 2218     Updated: 09/29/20 2232    Narrative:      CT ANGIOGRAM CHEST     HISTORY: Extensive medical history. Shortness of breath. Evaluate for  pulmonary embolism.     TECHNIQUE: CT angiogram the chest with multiplanar and three-dimensional  reformatted images, produced a separate workstation, was performed using  95 mL of Isovue-370 contrast. This is correlated with CT angiogram chest  09/16/2019 and chest x-ray from earlier today.     Radiation dose reduction techniques were utilized, including automated  exposure control and exposure modulation based on body size.     FINDINGS: The pulmonary arteries are normal in caliber and enhance  normally. The thoracic aorta is faintly opacified with contrast and  there is no evidence of dissection. There is  atheromatous vascular  calcification of the coronary arteries and elsewhere. There is a cardiac  pacing device. Chronic atelectasis is observed in the left lung base  with elevation of the left diaphragm. There is also atelectasis in the  posteromedial right lower lobe with volume loss and some secretions  within peripheral right lower lobe airways. The other airways are clear.  There is no pleural or pericardial effusion. Visualized upper abdominal  solid organs appear normal. There is some opacity in the right lower  lobe without significant volume loss which may represent a small area of  pneumonia. Extensive degenerative changes are observed in the spine. The  thoracic esophagus appears normal.       Impression:      No evidence of pulmonary thromboembolism. There is a new  infiltrate and atelectasis in the posterior right lower lobe where there  are some secretions opacifying several peripheral bronchioles. No other  acute appearing abnormality is present.      This report was finalized on 9/29/2020 10:29 PM by Dr. Jonah Kilgore M.D.       XR Chest 2 View [[id number]] Collected: 09/29/20 1941     Updated: 09/29/20 1952    Narrative:      TWO-VIEW CHEST     HISTORY: Cough and wheezing.     FINDINGS: The lungs are moderately well-expanded with prominent  elevation of the left hemidiaphragm unchanged from 08/29/2020. The heart  remains enlarged with a pacemaker in place and no acute abnormality is  seen.     This report was finalized on 9/29/2020 7:49 PM by Dr. Guille Trotter M.D.             I reviewed the patient's new clinical results.  I personally viewed and interpreted the patient's imaging results:        Medication Review:   aspirin, 81 mg, Oral, Daily  budesonide-formoterol, 2 puff, Inhalation, BID - RT  carvedilol, 3.125 mg, Oral, BID  cephalexin, 500 mg, Oral, TID  fluticasone, 1 spray, Nasal, BID  furosemide, 20 mg, Oral, QAM  glipizide, 2.5 mg, Oral, QAM  insulin lispro, 0-14 Units, Subcutaneous,  TID AC  ipratropium-albuterol, 3 mL, Nebulization, 4x Daily - RT  losartan, 25 mg, Oral, Daily  montelukast, 10 mg, Oral, Nightly  oxazepam, 10 mg, Oral, Nightly  oxybutynin XL, 10 mg, Oral, Nightly  pantoprazole, 40 mg, Oral, Daily  rosuvastatin, 20 mg, Oral, Nightly  sodium chloride, 10 mL, Intravenous, Q12H  sodium chloride, 4 mL, Nebulization, BID  warfarin (COUMADIN) (dosing per levels), , Does not apply, Daily        Pharmacy to dose warfarin,         ASSESSMENT:   1. COPD/asthma with mild exacerbation  2. Right lower lobe pneumonia with heavy growth of Pseudomonas on cultures  3. Chronic bronchitis  4. Acute hypoxemic respiratory failure  5. Elevated left hemidiaphragm: Chronic  6. Ischemic cardiomyopathy  7. Diastolic dysfunction  8. Mitral regurgitation  9. Atrial fibrillation and sick sinus syndrome  10. CLL  11. 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 Hypertension  13. Diabetes  14. CHARLENE on BiPAP  15. Immunodeficiency    PLAN:  Discussed the case with the patient in details, called and discussed with Dr. Sy.  The patient is immunocompromised and IVIG is recommended, we also discussed the option of adding inhaled tobramycin as an antibiotic for the Pseudomonas given the Pseudomonas bronchitis, the heavy growth on her culture, the immunocompromise status as well at the localized pneumonia on the chest CT.  We will continue to follow on the clinical response with further recommendation accordingly, the overall the patient is doing better.  Discussed with patient and with infectious disease    Disposition: Home    Juno Coburn MD  10/03/20  12:11 EDT            Dictated utilizing Dragon dictation

## 2020-10-03 NOTE — PROGRESS NOTES
LOS: 4 days     Chief Complaint:  Pneumonia    Interval History:  Afebrile, continues to feel well.  No new events or complaints.  No rashes.  She is off of oxygen    Vital Signs  Temp:  [97.5 °F (36.4 °C)-98 °F (36.7 °C)] 98 °F (36.7 °C)  Heart Rate:  [78-80] 79  Resp:  [16-24] 22  BP: (119-149)/(70-89) 119/70  95% on 2L NC    Physical Exam:  General: In no acute distress  Cardiovascular: RRR, + LE edema   Respiratory: upper airway sounds  GI: Soft, NT/ND, + bowel sounds bilaterally  Skin: No rashes     Antibiotics:  Cephalexin 500mg PO TID     Results Review:    Lab Results   Component Value Date    WBC 19.64 (H) 10/03/2020    HGB 12.0 10/03/2020    HCT 39.1 10/03/2020    MCV 97.5 (H) 10/03/2020     10/03/2020     Lab Results   Component Value Date    GLUCOSE 155 (H) 10/03/2020    BUN 24 (H) 10/03/2020    CREATININE 0.90 10/03/2020    EGFRIFNONA 60 (L) 10/03/2020    BCR 26.7 (H) 10/03/2020    CO2 29.6 (H) 10/03/2020    CALCIUM 8.6 10/03/2020    PROTENTOTREF 4.9 (L) 04/08/2014    ALBUMIN 3.80 10/02/2020    LABIL2 1.6 05/26/2020    AST 22 10/02/2020    ALT 25 10/02/2020     Procalcitonin 0.05 -> 0.06 -> 0.07    Microbiology:  9/30 SCx pseudomonas   9/29 BCx NGTD x 2  9/29 COVID neg    Assessment/Plan   Acute on chronic COPD asthma exacerbation  CLL  Low immunoglobulin levels  Possible right-sided pneumonia  Recurrent rhinovirus/enterovirus infection  Chronically elevated partially paralyzed left hemidiaphragm  Secondary pulmonary hypertension  Type 2 diabetes  Obstructive sleep apnea  Morbid obesity    Discussed the patient's case with Dr. Coburn.  He would like to start the patient on inhaled tobramycin.  Of note the patient's respiratory status improved prior to Zosyn administration.  She only got 1 dose    Continue cephalexin 500 mg p.o. 3 times daily for chronic suppressive therapy of her prosthetic joint    ID will sign off.  Please do not hesitate to call us with further questions or concerns

## 2020-10-03 NOTE — THERAPY TREATMENT NOTE
Patient Name: Caitlyn Longoria  : 1934    MRN: [id number]                              Today's Date: 10/3/2020       Admit Date: 2020    Visit Dx:     ICD-10-CM ICD-9-CM   1. Dyspnea, unspecified type  R06.00 786.09   2. Pneumonia due to infectious organism, unspecified laterality, unspecified part of lung  J18.9 486   3. Leukocytosis, unspecified type  D72.829 288.60     Patient Active Problem List   Diagnosis   • Neck and shoulder pain   • Arthropathy of shoulder region   • Carpal tunnel syndrome of left wrist   • Chronic pain of both shoulders   • Chronic left shoulder pain   • Arthropathy of left shoulder   • Dysarthria   • DM II (diabetes mellitus, type II), controlled (CMS/[facility])   • Paroxysmal atrial fibrillation (CMS/[facility])   • HLD (hyperlipidemia)   • Chronic bronchitis (CMS/[facility])   • Coronary artery disease involving native coronary artery of native heart with angina pectoris (CMS/[facility])   • CVA (cerebral vascular accident) (CMS/[facility])   • HTN (hypertension)   • CKD (chronic kidney disease), stage III   • Chronic combined systolic and diastolic congestive heart failure (CMS/[facility])   • Infection of prosthetic right knee joint (CMS/[facility])   • Stasis dermatitis of right lower extremity due to peripheral venous hypertension   • Current use of long term anticoagulation   • Morbid obesity (CMS/[facility])   • Acute respiratory failure with hypoxia (CMS/[facility])   • Rhinovirus   • Asthma with acute exacerbation   • Acute respiratory failure with hypoxemia (CMS/[facility])   • Viral pneumonia   • Hypoxia   • CLL (chronic lymphoid leukemia) in relapse (CMS/[facility])   • Dyspnea   • Anticoagulated on Coumadin   • Closed wedge compression fracture of T5 vertebra (CMS/[facility])   • Pneumonia due to gram-negative bacteria (CMS/[facility])     Past Medical History:   Diagnosis Date   • Aortic calcification (CMS/[facility])     mild, on echo 2017   • Aortic regurgitation     Trace   • Asthma    • Atrial fibrillation (CMS/[facility])    • CAD (coronary artery  disease)    • Chronic combined systolic and diastolic congestive heart failure (CMS/[facility])    • CKD (chronic kidney disease) stage 3, GFR 30-59 ml/min    • Coronary artery disease involving native coronary artery of native heart with angina pectoris (CMS/[facility])    • Disc degeneration, lumbar    • DM type 2 (diabetes mellitus, type 2) (CMS/[facility])    • GERD (gastroesophageal reflux disease)    • History of aneurysm     right femoral artery s/p LHC   • History of blood transfusion    • History of fracture    • History of vitamin D deficiency    • Hyperlipidemia    • Hypertension    • Mild mitral regurgitation    • Mitral annular calcification     12/8/2017- echo, moderate   • PAF (paroxysmal atrial fibrillation) (CMS/[facility])    • Peripheral neuropathy    • Skin cancer     Left hand   • Sleep apnea    • SSS (sick sinus syndrome) (CMS/[facility])    • Stroke (cerebrum) (CMS/[facility])    • Tricuspid regurgitation     Trace     Past Surgical History:   Procedure Laterality Date   • CARDIAC CATHETERIZATION     • CARDIAC ELECTROPHYSIOLOGY PROCEDURE N/A 2/7/2020    Procedure: PPM generator change - dual  medtronic;  Surgeon: Kiel Field MD;  Location: [facility] INVASIVE LOCATION;  Service: Cardiology;  Laterality: N/A;   • CHOLECYSTECTOMY     • CORONARY STENT PLACEMENT     • HERNIA REPAIR      hital hernia   • HYSTERECTOMY     • PACEMAKER IMPLANTATION     • REPLACEMENT TOTAL KNEE Bilateral      General Information     Row Name 10/03/20 1201          Physical Therapy Time and Intention    Document Type  therapy note (daily note)  -LB     Mode of Treatment  physical therapy  -     Row Name 10/03/20 1201          General Information    Patient Profile Reviewed  yes  -LB     Prior Level of Function  independent: using rolling walker  -LB     Existing Precautions/Restrictions  fall;oxygen therapy device and L/min  -LB       User Key  (r) = Recorded By, (t) = Taken By, (c) = Cosigned By    Initials Name Provider Type    LB Rosalinda Lwarence  PT Physical Therapist        Mobility     Row Name 10/03/20 1201          Bed Mobility    Supine-Sit [place] (Bed Mobility)  not tested pt up in chair  -LB     Comment (Bed Mobility)  pt up in chair and return to chair  -LB     Row Name 10/03/20 1201          Transfers    Comment (Transfers)  wearing TLSO  -LB     Row Name 10/03/20 1201          Sit-Stand Transfer    Sit-Stand [place] (Transfers)  contact guard  -LB     Assistive Device (Sit-Stand Transfers)  walker, front-wheeled  -LB     Row Name 10/03/20 1201          Gait/Stairs (Locomotion)    [place] Level (Gait)  contact guard  -LB     Assistive Device (Gait)  walker, front-wheeled  -LB     Distance in Feet (Gait)  120'  -LB     Bilateral Gait Deviations  forward flexed posture;heel strike decreased  -LB     Comment (Gait/Stairs)  pt wearing TLSO, 2 L O2  -LB       User Key  (r) = Recorded By, (t) = Taken By, (c) = Cosigned By    Initials Name Provider Type    Rosalinda Cooney PT Physical Therapist        Obj/Interventions    No documentation.       Goals/Plan    No documentation.       Clinical Impression     Row Name 10/03/20 1240          Pain Scale: Numbers Pre/Post-Treatment    Pretreatment Pain Rating  0/10 - no pain  -LB     Posttreatment Pain Rating  0/10 - no pain  -LB     Pain Intervention(s)  Ambulation/increased activity;Repositioned  -LB     Row Name 10/03/20 1240          Plan of Care Review    Plan of Care Reviewed With  patient  -LB     Progress  improving  -LB     Outcome Summary  Pt wearing TLSO, difficulty fitting shoulder straps to pt's body shape but we worked on improving fit and pt able to don independently. Ambulated 120' with 2 L O2 and RW. Pt did well with minimal SOA for ambulation distance. Pt steady on feet and aware of deficits. Eager to return to assisted living facility.  -LB     Row Name 10/03/20 1240          Therapy Assessment/Plan (PT)    Rehab Potential (PT)  good, to achieve stated therapy goals  -LB      Row Name 10/03/20 1240          Vital Signs    Pre SpO2 (%)  92  -LB     O2 Delivery Pre Treatment  supplemental O2  -LB     Intra SpO2 (%)  86  -LB     O2 Delivery Intra Treatment  supplemental O2  -LB     Post SpO2 (%)  92  -LB     O2 Delivery Post Treatment  supplemental O2  -LB     Pre Patient Position  Sitting  -LB     Intra Patient Position  Standing  -LB     Post Patient Position  Sitting  -LB     Row Name 10/03/20 1240          Positioning and Restraints    Pre-Treatment Position  sitting in chair/recliner  -LB     Post Treatment Position  chair  -LB     In Chair  exit alarm on;reclined;sitting;call light within reach;encouraged to call for assist  -LB       User Key  (r) = Recorded By, (t) = Taken By, (c) = Cosigned By    Initials Name Provider Type    Rosalinda Cooney PT Physical Therapist        Outcome Measures     Row Name 10/03/20 1242          How much help from another person do you currently need...    Turning from your back to your side while in flat bed without using bedrails?  4  -LB     Moving from lying on back to sitting on the side of a flat bed without bedrails?  4  -LB     Moving to and from a bed to a chair (including a wheelchair)?  4  -LB     Standing up from a chair using your arms (e.g., wheelchair, bedside chair)?  4  -LB     Climbing 3-5 steps with a railing?  3  -LB     To walk in hospital room?  3  -LB     AM-PAC 6 Clicks Score (PT)  22  -LB     Row Name 10/03/20 1242          Functional Assessment    Outcome Measure Options  AM-PAC 6 Clicks Basic Mobility (PT)  -LB       User Key  (r) = Recorded By, (t) = Taken By, (c) = Cosigned By    Initials Name Provider Type    Rosalinda Cooney PT Physical Therapist        Physical Therapy Education                 Title: PT OT SLP Therapies (Done)     Topic: Physical Therapy (Done)     Point: Mobility training (Done)     Learning Progress Summary           Patient Acceptance, EILEEN,LACIE, DONALD,DU by JANY at 10/3/2020 1243    EagerEILEEN TB,BATSHEVA, DONALD,NR by  LIBRA at 10/2/2020 1717    Eager, TB, DU,NR by MARIBELL at 10/1/2020 1558    Comment: review TLSO                   Point: Home exercise program (Done)     Learning Progress Summary           Patient Acceptance, E,TB, VU,DU by LB at 10/3/2020 1243    Eager, E,TB,D, VU,NR by LIBRA at 10/2/2020 1717    Eager, TB, DU,NR by DJ at 10/1/2020 1558    Comment: review TLSO                   Point: Body mechanics (Done)     Learning Progress Summary           Patient Acceptance, E,TB, VU,DU by LB at 10/3/2020 1243    Eager, E,TB,D, VU,NR by LIBRA at 10/2/2020 1717    Eager, TB, DU,NR by  at 10/1/2020 1558    Comment: review TLSO                   Point: Precautions (Done)     Learning Progress Summary           Patient Acceptance, E,TB, VU,DU by LB at 10/3/2020 1243    Eager, E,TB,D, VU,NR by LIBRA at 10/2/2020 1717    Eager, TB, DU,NR by  at 10/1/2020 1558    Comment: review TLSO                               User Key     Initials Effective Dates Name Provider Type Discipline     03/07/18 -  Jaylin Saunders, PTA Physical Therapy Assistant PT     08/09/20 -  Rosalinda Lawrence, PT Physical Therapist PT     10/25/19 -  Edie Lowry, PT Physical Therapist PT              PT Recommendation and Plan     Plan of Care Reviewed With: patient  Progress: improving  Outcome Summary: Pt wearing TLSO, difficulty fitting shoulder straps to pt's body shape but we worked on improving fit and pt able to don independently. Ambulated 120' with 2 L O2 and RW. Pt did well with minimal SOA for ambulation distance. Pt steady on feet and aware of deficits. Eager to return to assisted living facility.     Time Calculation:   PT Charges     Row Name 10/03/20 1243             Time Calculation    Start Time  1135  -LB      Stop Time  1200  -LB      Time Calculation (min)  25 min  -LB      PT Received On  10/03/20  -LB      PT - Next Appointment  10/04/20  -LB         Time Calculation- PT    Total Timed Code Minutes- PT  23 minute(s)  -LB        User Key  (r) =  Recorded By, (t) = Taken By, (c) = Cosigned By    Initials Name Provider Type    LB Rosalinda Lawrence, PT Physical Therapist        Therapy Charges for Today     Code Description Service Date Service Provider Modifiers Qty    81677460229 HC PT THER PROC EA 15 MIN 10/3/2020 Rosalinda Lawrence, PT GP 2          PT G-Codes  Outcome Measure Options: AM-PAC 6 Clicks Basic Mobility (PT)  AM-PAC 6 Clicks Score (PT): 22    Rosalinda Lawrence PT  10/3/2020

## 2020-10-03 NOTE — PROGRESS NOTES
Discussed PNA prevention and asthma triggers with patient. Also explained importance of cough and deep breathing/lung expansion.

## 2020-10-03 NOTE — PROGRESS NOTES
Pharmacy Consult: Warfarin Dosing/ Monitoring    Caitlyn Longoria is a 85 y.o. female, estimated creatinine clearance is 49.3 mL/min (by C-G formula based on SCr of 0.9 mg/dL). weighing 95.7 kg (211 lb).     has a past medical history of Aortic calcification (CMS/HCC), Aortic regurgitation, Asthma, Atrial fibrillation (CMS/HCC), CAD (coronary artery disease), Chronic combined systolic and diastolic congestive heart failure (CMS/HCC), CKD (chronic kidney disease) stage 3, GFR 30-59 ml/min, Coronary artery disease involving native coronary artery of native heart with angina pectoris (CMS/[facility]), Disc degeneration, lumbar, DM type 2 (diabetes mellitus, type 2) (CMS/[facility]), GERD (gastroesophageal reflux disease), History of aneurysm, History of blood transfusion, History of fracture, History of vitamin D deficiency, Hyperlipidemia, Hypertension, Mild mitral regurgitation, Mitral annular calcification, PAF (paroxysmal atrial fibrillation) (CMS/[facility]), Peripheral neuropathy, Skin cancer, Sleep apnea, SSS (sick sinus syndrome) (CMS/[facility]), Stroke (cerebrum) (CMS/[facility]), and Tricuspid regurgitation.    Social History     Tobacco Use    Smoking status: Never Smoker    Smokeless tobacco: Never Used    Tobacco comment: caffeine use- soda   Substance Use Topics    Alcohol use: Not Currently    Drug use: No       Results from last 7 days   Lab Units 10/03/20  0830 10/02/20  0747 10/01/20  0710 09/30/20  0702 09/29/20  1755   INR  1.98* 2.31* 3.60* 3.78* 3.41*   HEMOGLOBIN g/dL 12.0 12.4 11.3*  --  12.7   HEMATOCRIT % 39.1 38.8 35.1  --  39.2   PLATELETS 10*3/mm3 162 158 145  --  158     Results from last 7 days   Lab Units 10/03/20  0830 10/02/20  0747 10/01/20  0710  09/29/20  1755   SODIUM mmol/L 140 139 140   < > 141   POTASSIUM mmol/L 4.1 3.7 3.9   < > 3.2*   CHLORIDE mmol/L 100 101 103   < > 105   CO2 mmol/L 29.6* 31.1* 27.4   < > 22.9   BUN mg/dL 24* 25* 18   < > 16   CREATININE mg/dL 0.90 0.87 0.90   < > 1.00   CALCIUM mg/dL 8.6  8.4* 8.5*   < > 9.1   BILIRUBIN mg/dL  --  0.5 0.4  --  0.8   ALK PHOS U/L  --  106 91  --  105   ALT (SGPT) U/L  --  25 19  --  23   AST (SGOT) U/L  --  22 16  --  17   GLUCOSE mg/dL 155* 148* 189*   < > 131*    < > = values in this interval not displayed.     Anticoagulation history: h/o atrial fibrillation managed on warfarin through [facility]. As of 9/23 regimen noted as: Warfarin 2mg Su/T/W/Th/Sa and Warfarin 4mg M/F     Hospital Anticoagulation:  Consulting provider: Dr. Yoo  Start date: 9/30  Indication: Atrial Fibrillation  Target INR: 2-3  Expected duration: Indefinite   Bridge Therapy: No                  Date 9/29 9/30  10/1 10/2  10/3             INR 3.41 3.78  3.6 2.31  1.98             Warfarin dose 0 0 0  2  mg  3mg                Potential drug interactions:    d/c'ed 10/2  Crestor (home med, Moderate): May enhance anticoagulant effect of warfarin  Pantoprazole (Moderate, home med): May enhance anticoagulant effect of warfarin.  Acetaminophen (prn) - may result in increased risk of bleeding     -- once dose doxy / ctx on 9/29      Relevant nutrition status: Reg diet, % of intake charted yesterday/today     Other:      Education complete?/ Date: Not at this time    Assessment/Plan:  INR slightly subtherapeutic since we have been holding.   Give 3mg warfarin today.  Monitor INR, DDI, s/s bleeding    Pharmacy will continue to follow until discharge or discontinuation of warfarin.     Sonia Cortez RPH  10/3/2020

## 2020-10-03 NOTE — H&P
"DAILY PROGRESS NOTE  [facility]    Patient Identification:  Name: Caitlyn Longoria  Age: 85 y.o.  Sex: female  :  1934  MRN: [id number]         Primary Care Physician: Zenaida Suarez MD    Subjective:  Interval History:Short of air on 2 L O 2 .    Objective:    Scheduled Meds:aspirin, 81 mg, Oral, Daily  budesonide-formoterol, 2 puff, Inhalation, BID - RT  carvedilol, 3.125 mg, Oral, BID  cephalexin, 500 mg, Oral, TID  fluticasone, 1 spray, Nasal, BID  furosemide, 20 mg, Oral, QAM  glipizide, 2.5 mg, Oral, QAM  insulin lispro, 0-14 Units, Subcutaneous, TID AC  ipratropium-albuterol, 3 mL, Nebulization, 4x Daily - RT  losartan, 25 mg, Oral, Daily  montelukast, 10 mg, Oral, Nightly  oxazepam, 10 mg, Oral, Nightly  oxybutynin XL, 10 mg, Oral, Nightly  pantoprazole, 40 mg, Oral, Daily  rosuvastatin, 20 mg, Oral, Nightly  sodium chloride, 10 mL, Intravenous, Q12H  sodium chloride, 4 mL, Nebulization, BID  tobramycin, 300 mg, Nebulization, BID - RT  warfarin (COUMADIN) (dosing per levels), , Does not apply, Daily  warfarin, 3 mg, Oral, Once      Continuous Infusions:Pharmacy to dose warfarin,         Vital signs in last 24 hours:  Temp:  [97.5 °F (36.4 °C)-98 °F (36.7 °C)] 98 °F (36.7 °C)  Heart Rate:  [78-80] 79  Resp:  [16-24] 22  BP: (119-149)/(70-89) 119/70    Intake/Output:    Intake/Output Summary (Last 24 hours) at 10/3/2020 1240  Last data filed at 10/3/2020 0900  Gross per 24 hour   Intake 1080 ml   Output 1050 ml   Net 30 ml       Exam:  /70 (BP Location: Left arm, Patient Position: Lying)   Pulse 79   Temp 98 °F (36.7 °C) (Oral)   Resp 22   Ht 157.5 cm (62\")   Wt 95.7 kg (211 lb)   SpO2 100%   BMI 38.59 kg/m²     General Appearance:    Alert, cooperative, no distress   Head:    Normocephalic, without obvious abnormality, atraumatic   Eyes:       Throat:   Lips, tongue, gums normal   Neck:   Supple, symmetrical, trachea midline, no JVD   Lungs:     Rhonchi and " wheezes. bilaterally, respirations unlabored   Chest Wall:    No tenderness or deformity    Heart:    Regular rate and rhythm, S1 and S2 normal, no murmur,no  Rub or gallop   Abdomen:     Soft, nontender, bowel sounds active, no masses, no organomegaly    Extremities:   Extremities normal, atraumatic, no cyanosis or edema   Pulses:      Skin:   Skin is warm and dry,  no rashes or palpable lesions   Neurologic:   no focal deficits noted      Lab Results (last 72 hours)     Procedure Component Value Units Date/Time    POC Glucose Once [[id number]]  (Abnormal) Collected: 09/30/20 1557    Specimen: Blood Updated: 09/30/20 1559     Glucose 358 mg/dL     POC Glucose Once [[id number]]  (Abnormal) Collected: 09/30/20 1158    Specimen: Blood Updated: 09/30/20 1205     Glucose 366 mg/dL     COVID PRE-OP / PRE-PROCEDURE SCREENING ORDER (NO ISOLATION) - Swab, Nasopharynx [[id number]] Collected: 09/29/20 1819    Specimen: Swab from Nasopharynx Updated: 09/30/20 1135    Narrative:      The following orders were created for panel order COVID PRE-OP / PRE-PROCEDURE SCREENING ORDER (NO ISOLATION) - Swab, Nasopharynx.  Procedure                               Abnormality         Status                     ---------                               -----------         ------                     COVID-19,BIOTAP, NP/OP S...[[id number]]                      Final result                 Please view results for these tests on the individual orders.    COVID-19,BIOTAP, NP/OP SWAB IN TRANSPORT MEDIA OR SALINE 24-36 HR TAT - Swab, Nasopharynx [[id number]] Collected: 09/29/20 1819    Specimen: Swab from Nasopharynx Updated: 09/30/20 1135     SARS-CoV-2 PCR Not Detected     Comment: Nucleic acid specific to SARS-CoV-2 (COVID-19) virus was not detected inthis sample by the TaqPath (TM) COVID-19 Combo Kit.SARS-CoV-2 (COVID-19) nucleic acid testing performed using [employer] Aptima (R) SARS-CoV-2 Assay or View3 TaqPath   (TM)COVID-19 Combo  "Kit as indicated above under Emergency Use Authorization (EUA) from the FDA. Aptima (R) and TaqPath (TM) test performancecharacteristics verified by [facility] in accordance with the FDAs Guidance Document (Policy for Diagnostic   Tests for Coronavirus Disease-2019during the Public Health Emergency) issued on March 16, 2020. The laboratory is regulated under CLIA as qualified to perform high-complexity testingUnless otherwise noted, all testing was performed at [facility],   CLIA No. [id number], [place] State Licensee No. [id number]       Respiratory Culture - Sputum, Cough [[id number]] Collected: 09/30/20 1114    Specimen: Sputum from Cough Updated: 09/30/20 1123    Procalcitonin [[id number]]  (Normal) Collected: 09/30/20 0702    Specimen: Blood from Hand, Left Updated: 09/30/20 0810     Procalcitonin 0.06 ng/mL     Narrative:      As a Marker for Sepsis (Non-Neonates):   1. <0.5 ng/mL represents a low risk of severe sepsis and/or septic shock.  1. >2 ng/mL represents a high risk of severe sepsis and/or septic shock.    As a Marker for Lower Respiratory Tract Infections that require antibiotic therapy:  PCT on Admission     Antibiotic Therapy             6-12 Hrs later  > 0.5                Strongly Recommended            >0.25 - <0.5         Recommended  0.1 - 0.25           Discouraged                   Remeasure/reassess PCT  <0.1                 Strongly Discouraged          Remeasure/reassess PCT      As 28 day mortality risk marker: \"Change in Procalcitonin Result\" (> 80 % or <=80 %) if Day 0 (or Day 1) and Day 4 values are available. Refer to http://[email or web address]-pct-[email or web address]/   Change in PCT <=80 %   A decrease of PCT levels below or equal to 80 % defines a positive change in PCT test result representing a higher risk for 28-day all-cause mortality of patients diagnosed with severe sepsis or septic shock.  Change in PCT > 80 %   A decrease of PCT levels of more than 80 % defines a negative change in PCT " "result representing a lower risk for 28-day all-cause mortality of patients diagnosed with severe sepsis or septic shock.                Results may be falsely decreased if patient taking Biotin.     TSH [[id number]]  (Normal) Collected: 09/30/20 0702    Specimen: Blood from Hand, Left Updated: 09/30/20 0805     TSH 0.801 uIU/mL     Basic Metabolic Panel [[id number]]  (Abnormal) Collected: 09/30/20 0702    Specimen: Blood from Hand, Left Updated: 09/30/20 0803     Glucose 235 mg/dL      BUN 16 mg/dL      Creatinine 0.84 mg/dL      Sodium 142 mmol/L      Potassium 4.1 mmol/L      Chloride 108 mmol/L      CO2 21.9 mmol/L      Calcium 8.3 mg/dL      eGFR Non African Amer 64 mL/min/1.73      BUN/Creatinine Ratio 19.0     Anion Gap 12.1 mmol/L     Narrative:      GFR Normal >60  Chronic Kidney Disease <60  Kidney Failure <15      Protime-INR [[id number]]  (Abnormal) Collected: 09/30/20 0702    Specimen: Blood from Hand, Left Updated: 09/30/20 0741     Protime 35.9 Seconds      INR 3.78    Procalcitonin [[id number]]  (Normal) Collected: 09/29/20 2004    Specimen: Blood Updated: 09/30/20 0024     Procalcitonin 0.05 ng/mL     Narrative:      As a Marker for Sepsis (Non-Neonates):   1. <0.5 ng/mL represents a low risk of severe sepsis and/or septic shock.  1. >2 ng/mL represents a high risk of severe sepsis and/or septic shock.    As a Marker for Lower Respiratory Tract Infections that require antibiotic therapy:  PCT on Admission     Antibiotic Therapy             6-12 Hrs later  > 0.5                Strongly Recommended            >0.25 - <0.5         Recommended  0.1 - 0.25           Discouraged                   Remeasure/reassess PCT  <0.1                 Strongly Discouraged          Remeasure/reassess PCT      As 28 day mortality risk marker: \"Change in Procalcitonin Result\" (> 80 % or <=80 %) if Day 0 (or Day 1) and Day 4 values are available. Refer to http://www.[employer]-pct-calculator.com/   Change in PCT <=80 %   A " decrease of PCT levels below or equal to 80 % defines a positive change in PCT test result representing a higher risk for 28-day all-cause mortality of patients diagnosed with severe sepsis or septic shock.  Change in PCT > 80 %   A decrease of PCT levels of more than 80 % defines a negative change in PCT result representing a lower risk for 28-day all-cause mortality of patients diagnosed with severe sepsis or septic shock.                Results may be falsely decreased if patient taking Biotin.     POC Glucose Once [[id number]]  (Normal) Collected: 09/30/20 0006    Specimen: Blood Updated: 09/30/20 0007     Glucose 118 mg/dL     Troponin [[id number]]  (Normal) Collected: 09/29/20 2004    Specimen: Blood Updated: 09/29/20 2041     Troponin T <0.010 ng/mL     Narrative:      Troponin T Reference Range:  <= 0.03 ng/mL-   Negative for AMI  >0.03 ng/mL-     Abnormal for myocardial necrosis.  Clinicians would have to utilize clinical acumen, EKG, Troponin and serial changes to determine if it is an Acute Myocardial Infarction or myocardial injury due to an underlying chronic condition.       Results may be falsely decreased if patient taking Biotin.      Blood Culture - Blood, Arm, Left [[id number]] Collected: 09/29/20 2004    Specimen: Blood from Arm, Left Updated: 09/29/20 2012    CBC & Differential [[id number]]  (Abnormal) Collected: 09/29/20 1755    Specimen: Blood Updated: 09/29/20 1928    Narrative:      The following orders were created for panel order CBC & Differential.  Procedure                               Abnormality         Status                     ---------                               -----------         ------                     CBC Auto Differential[[id number]]        Abnormal            Final result                 Please view results for these tests on the individual orders.    CBC Auto Differential [[id number]]  (Abnormal) Collected: 09/29/20 1755    Specimen: Blood Updated: 09/29/20 1928     WBC  24.44 10*3/mm3      RBC 4.22 10*6/mm3      Hemoglobin 12.7 g/dL      Hematocrit 39.2 %      MCV 92.9 fL      MCH 30.1 pg      MCHC 32.4 g/dL      RDW 12.7 %      RDW-SD 42.6 fl      MPV 10.0 fL      Platelets 158 10*3/mm3     Manual Differential [[id number]]  (Abnormal) Collected: 09/29/20 1755    Specimen: Blood Updated: 09/29/20 1928     Neutrophil % 28.0 %      Lymphocyte % 70.0 %      Monocyte % 1.0 %      Basophil % 1.0 %      Neutrophils Absolute 6.84 10*3/mm3      Lymphocytes Absolute 17.11 10*3/mm3      Monocytes Absolute 0.24 10*3/mm3      Basophils Absolute 0.24 10*3/mm3      RBC Morphology Normal     Smudge Cells Mod/2+     Platelet Morphology Normal    BNP [[id number]]  (Normal) Collected: 09/29/20 1755    Specimen: Blood Updated: 09/29/20 1847     proBNP 535.5 pg/mL     Narrative:      Among patients with dyspnea, NT-proBNP is highly sensitive for the detection of acute congestive heart failure. In addition NT-proBNP of <300 pg/ml effectively rules out acute congestive heart failure with 99% negative predictive value.    Results may be falsely decreased if patient taking Biotin.      Troponin [[id number]]  (Normal) Collected: 09/29/20 1755    Specimen: Blood Updated: 09/29/20 1847     Troponin T <0.010 ng/mL     Narrative:      Troponin T Reference Range:  <= 0.03 ng/mL-   Negative for AMI  >0.03 ng/mL-     Abnormal for myocardial necrosis.  Clinicians would have to utilize clinical acumen, EKG, Troponin and serial changes to determine if it is an Acute Myocardial Infarction or myocardial injury due to an underlying chronic condition.       Results may be falsely decreased if patient taking Biotin.      Comprehensive Metabolic Panel [[id number]]  (Abnormal) Collected: 09/29/20 1755    Specimen: Blood Updated: 09/29/20 1843     Glucose 131 mg/dL      BUN 16 mg/dL      Creatinine 1.00 mg/dL      Sodium 141 mmol/L      Potassium 3.2 mmol/L      Chloride 105 mmol/L      CO2 22.9 mmol/L      Calcium 9.1 mg/dL         Total Protein 6.4 g/dL      Albumin 3.90 g/dL      ALT (SGPT) 23 U/L      AST (SGOT) 17 U/L      Alkaline Phosphatase 105 U/L      Total Bilirubin 0.8 mg/dL      eGFR Non African Amer 53 mL/min/1.73      Globulin 2.5 gm/dL      A/G Ratio 1.6 g/dL      BUN/Creatinine Ratio 16.0     Anion Gap 13.1 mmol/L     Narrative:      GFR Normal >60  Chronic Kidney Disease <60  Kidney Failure <15      Lactic Acid, Plasma [[id number]]  (Normal) Collected: 09/29/20 1755    Specimen: Blood Updated: 09/29/20 1831     Lactate 1.0 mmol/L     D-dimer, Quantitative [[id number]]  (Abnormal) Collected: 09/29/20 1755    Specimen: Blood Updated: 09/29/20 1829     D-Dimer, Quantitative 0.66 MCGFEU/mL     Narrative:      The [employer] D-Dimer test used in conjunction with a clinical pretest probability (PTP) assessment model, has been approved by the FDA to rule out the presence of venous thromboembolism (VTE) in outpatients suspected of deep venous thrombosis (DVT) or pulmonary embolism (PE). The cut-off for negative predictive value is <0.50 MCGFEU/mL.    Protime-INR [[id number]]  (Abnormal) Collected: 09/29/20 1755    Specimen: Blood Updated: 09/29/20 1829     Protime 33.2 Seconds      INR 3.41    Blood Culture - Blood, Arm, Left [[id number]] Collected: 09/29/20 1755    Specimen: Blood from Arm, Left Updated: 09/29/20 1812    Blood Gas, Venous [[id number]]  (Abnormal) Collected: 09/29/20 1801    Specimen: Venous Blood Updated: 09/29/20 1804     Site RV     pH, Venous 7.431 pH Units      pCO2, Venous 34.2 mm Hg      pO2, Venous 40.6 mm Hg      HCO3, Venous 22.7 mmol/L      Base Excess, Venous -1.1 mmol/L      O2 Saturation Calculated 77.8 %      Barometric Pressure for Blood Gas 744.9 mmHg      Modality Room Air     Rate 26 Breaths/minute         Data Review:  Results from last 7 days   Lab Units 10/03/20  0830 10/02/20  0747 10/01/20  0710   SODIUM mmol/L 140 139 140   POTASSIUM mmol/L 4.1 3.7 3.9   CHLORIDE mmol/L 100 101 103   CO2 mmol/L  29.6* 31.1* 27.4   BUN mg/dL 24* 25* 18   CREATININE mg/dL 0.90 0.87 0.90   GLUCOSE mg/dL 155* 148* 189*   CALCIUM mg/dL 8.6 8.4* 8.5*     Results from last 7 days   Lab Units 10/03/20  0830 10/02/20  0747 10/01/20  0710   WBC 10*3/mm3 19.64* 22.64* 23.36*   HEMOGLOBIN g/dL 12.0 12.4 11.3*   HEMATOCRIT % 39.1 38.8 35.1   PLATELETS 10*3/mm3 162 158 145     Results from last 7 days   Lab Units 09/30/20  0702   TSH uIU/mL 0.801         Lab Results   Lab Value Date/Time    TROPONINT <0.010 09/29/2020 2004    TROPONINT <0.010 09/29/2020 1755    TROPONINT <0.010 08/29/2020 1036    TROPONINT <0.010 04/28/2020 1421    TROPONINT <0.010 04/23/2020 2143    TROPONINT 0.014 02/11/2020 1748    TROPONINT <0.010 09/16/2019 1207    TROPONINT 0.12 (C) 03/27/2014 0955    TROPONINT 0.14 (C) 03/27/2014 0355    TROPONINT 0.14 (C) 03/26/2014 1755    TROPONINT 0.15 (C) 03/26/2014 0349         Results from last 7 days   Lab Units 10/02/20  0747 10/01/20  0710 09/29/20  1755   ALK PHOS U/L 106 91 105   BILIRUBIN mg/dL 0.5 0.4 0.8   ALT (SGPT) U/L 25 19 23   AST (SGOT) U/L 22 16 17     Results from last 7 days   Lab Units 09/30/20  0702   TSH uIU/mL 0.801         Glucose   Date/Time Value Ref Range Status   10/03/2020 0612 153 (H) 70 - 130 mg/dL Final   10/02/2020 2036 219 (H) 70 - 130 mg/dL Final   10/02/2020 1647 136 (H) 70 - 130 mg/dL Final   10/02/2020 1131 168 (H) 70 - 130 mg/dL Final   10/02/2020 0619 131 (H) 70 - 130 mg/dL Final   10/01/2020 2119 136 (H) 70 - 130 mg/dL Final   10/01/2020 1622 130 70 - 130 mg/dL Final   10/01/2020 1155 137 (H) 70 - 130 mg/dL Final     Results from last 7 days   Lab Units 10/03/20  0830 10/02/20  0747 10/01/20  0710   INR  1.98* 2.31* 3.60*       Past Medical History:   Diagnosis Date   • Aortic calcification (CMS/HCC)     mild, on echo 12/17/2017   • Aortic regurgitation     Trace   • Asthma    • Atrial fibrillation (CMS/HCC)    • CAD (coronary artery disease)    • Chronic combined systolic and  diastolic congestive heart failure (CMS/HCC)    • CKD (chronic kidney disease) stage 3, GFR 30-59 ml/min    • Coronary artery disease involving native coronary artery of native heart with angina pectoris (CMS/HCC)    • Disc degeneration, lumbar    • DM type 2 (diabetes mellitus, type 2) (CMS/HCC)    • GERD (gastroesophageal reflux disease)    • History of aneurysm     right femoral artery s/p LHC   • History of blood transfusion    • History of fracture    • History of vitamin D deficiency    • Hyperlipidemia    • Hypertension    • Mild mitral regurgitation    • Mitral annular calcification     12/8/2017- echo, moderate   • PAF (paroxysmal atrial fibrillation) (CMS/HCC)    • Peripheral neuropathy    • Skin cancer     Left hand   • Sleep apnea    • SSS (sick sinus syndrome) (CMS/HCC)    • Stroke (cerebrum) (CMS/HCC)    • Tricuspid regurgitation     Trace       Assessment:  Active Hospital Problems    Diagnosis  POA   • **Chronic bronchitis (CMS/HCC) [J42]  Yes   • Pneumonia due to gram-negative bacteria (CMS/[facility]) [J15.6]  Unknown   • Closed wedge compression fracture of T5 vertebra (CMS/[facility]) [S22.050A]  Unknown   • Dyspnea [R06.00]  Yes   • Anticoagulated on Coumadin [Z79.01]  Not Applicable   • CLL (chronic lymphoid leukemia) in relapse (CMS/[facility]) [C91.92]  Yes   • Rhinovirus [B34.8]  Yes   • Morbid obesity (CMS/[facility]) [E66.01]  Yes   • HTN (hypertension) [I10]  Yes   • CKD (chronic kidney disease), stage III [N18.30]  Yes   • DM II (diabetes mellitus, type II), controlled (CMS/[facility]) [E11.9]  Yes   • Paroxysmal atrial fibrillation (CMS/[facility]) [I48.0]  Yes      Resolved Hospital Problems   No resolved problems to display.       Plan:  Continue the nebs and O 2, await pulmonary. Follow lab.  Antibiotics for PNA    Saman Cruz MD  10/3/2020  12:40 EDT

## 2020-10-04 LAB
ANION GAP SERPL CALCULATED.3IONS-SCNC: 6.5 MMOL/L (ref 5–15)
BACTERIA SPEC AEROBE CULT: NORMAL
BACTERIA SPEC AEROBE CULT: NORMAL
BUN SERPL-MCNC: 20 MG/DL (ref 8–23)
BUN/CREAT SERPL: 25 (ref 7–25)
CALCIUM SPEC-SCNC: 8.3 MG/DL (ref 8.6–10.5)
CHLORIDE SERPL-SCNC: 96 MMOL/L (ref 98–107)
CO2 SERPL-SCNC: 30.5 MMOL/L (ref 22–29)
CREAT SERPL-MCNC: 0.8 MG/DL (ref 0.57–1)
DEPRECATED RDW RBC AUTO: 43.4 FL (ref 37–54)
ERYTHROCYTE [DISTWIDTH] IN BLOOD BY AUTOMATED COUNT: 12.4 % (ref 12.3–15.4)
GFR SERPL CREATININE-BSD FRML MDRD: 68 ML/MIN/1.73
GLUCOSE BLDC GLUCOMTR-MCNC: 130 MG/DL (ref 70–130)
GLUCOSE BLDC GLUCOMTR-MCNC: 181 MG/DL (ref 70–130)
GLUCOSE BLDC GLUCOMTR-MCNC: 195 MG/DL (ref 70–130)
GLUCOSE SERPL-MCNC: 158 MG/DL (ref 65–99)
HCT VFR BLD AUTO: 35.1 % (ref 34–46.6)
HGB BLD-MCNC: 11.2 G/DL (ref 12–15.9)
INR PPP: 1.7 (ref 0.9–1.1)
LYMPHOCYTES # BLD MANUAL: 8.64 10*3/MM3 (ref 0.7–3.1)
LYMPHOCYTES NFR BLD MANUAL: 1 % (ref 5–12)
LYMPHOCYTES NFR BLD MANUAL: 57.3 % (ref 19.6–45.3)
MCH RBC QN AUTO: 30.8 PG (ref 26.6–33)
MCHC RBC AUTO-ENTMCNC: 31.9 G/DL (ref 31.5–35.7)
MCV RBC AUTO: 96.4 FL (ref 79–97)
MONOCYTES # BLD AUTO: 0.15 10*3/MM3 (ref 0.1–0.9)
NEUTROPHILS # BLD AUTO: 6.29 10*3/MM3 (ref 1.7–7)
NEUTROPHILS NFR BLD MANUAL: 41.7 % (ref 42.7–76)
PLAT MORPH BLD: NORMAL
PLATELET # BLD AUTO: 136 10*3/MM3 (ref 140–450)
PMV BLD AUTO: 10.1 FL (ref 6–12)
POTASSIUM SERPL-SCNC: 3.8 MMOL/L (ref 3.5–5.2)
PROTHROMBIN TIME: 19.5 SECONDS (ref 11.7–14.2)
RBC # BLD AUTO: 3.64 10*6/MM3 (ref 3.77–5.28)
RBC MORPH BLD: NORMAL
SMUDGE CELLS BLD QL SMEAR: ABNORMAL
SODIUM SERPL-SCNC: 133 MMOL/L (ref 136–145)
WBC # BLD AUTO: 15.08 10*3/MM3 (ref 3.4–10.8)

## 2020-10-04 PROCEDURE — 97110 THERAPEUTIC EXERCISES: CPT

## 2020-10-04 PROCEDURE — 63710000001 INSULIN LISPRO (HUMAN) PER 5 UNITS: Performed by: HOSPITALIST

## 2020-10-04 PROCEDURE — 63710000001 TOBRAMYCIN PER 300 MG: Performed by: INTERNAL MEDICINE

## 2020-10-04 PROCEDURE — 94799 UNLISTED PULMONARY SVC/PX: CPT

## 2020-10-04 PROCEDURE — 82962 GLUCOSE BLOOD TEST: CPT

## 2020-10-04 PROCEDURE — 80048 BASIC METABOLIC PNL TOTAL CA: CPT | Performed by: HOSPITALIST

## 2020-10-04 PROCEDURE — 85025 COMPLETE CBC W/AUTO DIFF WBC: CPT | Performed by: HOSPITALIST

## 2020-10-04 PROCEDURE — 85610 PROTHROMBIN TIME: CPT | Performed by: HOSPITALIST

## 2020-10-04 PROCEDURE — 85007 BL SMEAR W/DIFF WBC COUNT: CPT | Performed by: HOSPITALIST

## 2020-10-04 RX ORDER — WARFARIN SODIUM 3 MG/1
3 TABLET ORAL
Status: DISCONTINUED | OUTPATIENT
Start: 2020-10-04 | End: 2020-10-04

## 2020-10-04 RX ORDER — WARFARIN SODIUM 3 MG/1
3 TABLET ORAL
Status: COMPLETED | OUTPATIENT
Start: 2020-10-04 | End: 2020-10-04

## 2020-10-04 RX ADMIN — MONTELUKAST SODIUM 10 MG: 10 TABLET, FILM COATED ORAL at 21:45

## 2020-10-04 RX ADMIN — SODIUM CHLORIDE, PRESERVATIVE FREE 10 ML: 5 INJECTION INTRAVENOUS at 09:35

## 2020-10-04 RX ADMIN — BUDESONIDE AND FORMOTEROL FUMARATE DIHYDRATE 2 PUFF: 160; 4.5 AEROSOL RESPIRATORY (INHALATION) at 12:42

## 2020-10-04 RX ADMIN — SODIUM CHLORIDE, PRESERVATIVE FREE 10 ML: 5 INJECTION INTRAVENOUS at 21:48

## 2020-10-04 RX ADMIN — CARVEDILOL 3.12 MG: 3.12 TABLET, FILM COATED ORAL at 21:45

## 2020-10-04 RX ADMIN — FLUTICASONE PROPIONATE 1 SPRAY: 50 SPRAY, METERED NASAL at 21:48

## 2020-10-04 RX ADMIN — CEPHALEXIN 500 MG: 500 CAPSULE ORAL at 17:12

## 2020-10-04 RX ADMIN — OXAZEPAM 10 MG: 10 CAPSULE, GELATIN COATED ORAL at 21:45

## 2020-10-04 RX ADMIN — Medication 4 ML: at 09:07

## 2020-10-04 RX ADMIN — CEPHALEXIN 500 MG: 500 CAPSULE ORAL at 09:33

## 2020-10-04 RX ADMIN — GLIPIZIDE 2.5 MG: 5 TABLET ORAL at 09:33

## 2020-10-04 RX ADMIN — IPRATROPIUM BROMIDE AND ALBUTEROL SULFATE 3 ML: 2.5; .5 SOLUTION RESPIRATORY (INHALATION) at 09:06

## 2020-10-04 RX ADMIN — INSULIN LISPRO 3 UNITS: 100 INJECTION, SOLUTION INTRAVENOUS; SUBCUTANEOUS at 12:19

## 2020-10-04 RX ADMIN — TOBRAMYCIN 300 MG: 1.2 INJECTION, POWDER, LYOPHILIZED, FOR SOLUTION INTRAVENOUS at 20:18

## 2020-10-04 RX ADMIN — IPRATROPIUM BROMIDE AND ALBUTEROL SULFATE 3 ML: 2.5; .5 SOLUTION RESPIRATORY (INHALATION) at 16:46

## 2020-10-04 RX ADMIN — HYDROCODONE BITARTRATE AND ACETAMINOPHEN 1 TABLET: 5; 325 TABLET ORAL at 09:33

## 2020-10-04 RX ADMIN — LOSARTAN POTASSIUM 25 MG: 25 TABLET, FILM COATED ORAL at 09:33

## 2020-10-04 RX ADMIN — CEPHALEXIN 500 MG: 500 CAPSULE ORAL at 21:45

## 2020-10-04 RX ADMIN — HYDROCODONE BITARTRATE AND ACETAMINOPHEN 1 TABLET: 5; 325 TABLET ORAL at 15:55

## 2020-10-04 RX ADMIN — Medication 4 ML: at 20:50

## 2020-10-04 RX ADMIN — ASPIRIN 81 MG: 81 TABLET, COATED ORAL at 09:33

## 2020-10-04 RX ADMIN — INSULIN LISPRO 3 UNITS: 100 INJECTION, SOLUTION INTRAVENOUS; SUBCUTANEOUS at 17:12

## 2020-10-04 RX ADMIN — CARVEDILOL 3.12 MG: 3.12 TABLET, FILM COATED ORAL at 09:33

## 2020-10-04 RX ADMIN — WARFARIN 3 MG: 3 TABLET ORAL at 17:13

## 2020-10-04 RX ADMIN — FUROSEMIDE 20 MG: 20 TABLET ORAL at 06:19

## 2020-10-04 RX ADMIN — OXYBUTYNIN CHLORIDE 10 MG: 10 TABLET, EXTENDED RELEASE ORAL at 21:45

## 2020-10-04 RX ADMIN — PANTOPRAZOLE SODIUM 40 MG: 40 TABLET, DELAYED RELEASE ORAL at 09:33

## 2020-10-04 RX ADMIN — FLUTICASONE PROPIONATE 1 SPRAY: 50 SPRAY, METERED NASAL at 09:34

## 2020-10-04 RX ADMIN — HYDROCODONE BITARTRATE AND ACETAMINOPHEN 1 TABLET: 5; 325 TABLET ORAL at 21:45

## 2020-10-04 RX ADMIN — IPRATROPIUM BROMIDE AND ALBUTEROL SULFATE 3 ML: 2.5; .5 SOLUTION RESPIRATORY (INHALATION) at 20:15

## 2020-10-04 RX ADMIN — ROSUVASTATIN CALCIUM 20 MG: 20 TABLET, FILM COATED ORAL at 21:46

## 2020-10-04 RX ADMIN — TOBRAMYCIN 300 MG: 1.2 INJECTION, POWDER, LYOPHILIZED, FOR SOLUTION INTRAVENOUS at 09:07

## 2020-10-04 NOTE — H&P
"DAILY PROGRESS NOTE  [facility]    Patient Identification:  Name: Caitlyn Longoria  Age: 85 y.o.  Sex: female  :  1934  MRN: [id number]         Primary Care Physician: Zenaida Suarez MD    Subjective:  Interval History:Short of air.    Objective:    Scheduled Meds:aspirin, 81 mg, Oral, Daily  budesonide-formoterol, 2 puff, Inhalation, BID - RT  carvedilol, 3.125 mg, Oral, BID  cephalexin, 500 mg, Oral, TID  fluticasone, 1 spray, Nasal, BID  furosemide, 20 mg, Oral, QAM  glipizide, 2.5 mg, Oral, QAM  insulin lispro, 0-14 Units, Subcutaneous, TID AC  ipratropium-albuterol, 3 mL, Nebulization, 4x Daily - RT  losartan, 25 mg, Oral, Daily  montelukast, 10 mg, Oral, Nightly  oxazepam, 10 mg, Oral, Nightly  oxybutynin XL, 10 mg, Oral, Nightly  pantoprazole, 40 mg, Oral, Daily  rosuvastatin, 20 mg, Oral, Nightly  sodium chloride, 10 mL, Intravenous, Q12H  sodium chloride, 4 mL, Nebulization, BID  tobramycin, 300 mg, Nebulization, BID - RT  warfarin (COUMADIN) (dosing per levels), , Does not apply, Daily  warfarin, 3 mg, Oral, Once      Continuous Infusions:Pharmacy to dose warfarin,         Vital signs in last 24 hours:  Temp:  [97.4 °F (36.3 °C)-97.8 °F (36.6 °C)] 97.4 °F (36.3 °C)  Heart Rate:  [79-95] 82  Resp:  [16-22] 20  BP: (116-121)/(70-80) 119/70    Intake/Output:    Intake/Output Summary (Last 24 hours) at 10/4/2020 1326  Last data filed at 10/4/2020 0900  Gross per 24 hour   Intake 720 ml   Output 1900 ml   Net -1180 ml       Exam:  /70 (BP Location: Left arm, Patient Position: Lying)   Pulse 82   Temp 97.4 °F (36.3 °C) (Oral)   Resp 20   Ht 157.5 cm (62\")   Wt 95.7 kg (211 lb)   SpO2 91%   BMI 38.59 kg/m²     General Appearance:    Alert, cooperative, no distress   Head:    Normocephalic, without obvious abnormality, atraumatic   Eyes:       Throat:   Lips, tongue, gums normal   Neck:   Supple, symmetrical, trachea midline, no JVD   Lungs:     Rhonchi and " wheezes. bilaterally, respirations unlabored   Chest Wall:    No tenderness or deformity    Heart:    Regular rate and rhythm, S1 and S2 normal, no murmur,no  Rub or gallop   Abdomen:     Soft, nontender, bowel sounds active, no masses, no organomegaly    Extremities:   Extremities normal, atraumatic, no cyanosis or edema   Pulses:      Skin:   Skin is warm and dry,  no rashes or palpable lesions   Neurologic:   no focal deficits noted      Lab Results (last 72 hours)     Procedure Component Value Units Date/Time    POC Glucose Once [[id number]]  (Abnormal) Collected: 09/30/20 1557    Specimen: Blood Updated: 09/30/20 1559     Glucose 358 mg/dL     POC Glucose Once [[id number]]  (Abnormal) Collected: 09/30/20 1158    Specimen: Blood Updated: 09/30/20 1205     Glucose 366 mg/dL     COVID PRE-OP / PRE-PROCEDURE SCREENING ORDER (NO ISOLATION) - Swab, Nasopharynx [[id number]] Collected: 09/29/20 1819    Specimen: Swab from Nasopharynx Updated: 09/30/20 1135    Narrative:      The following orders were created for panel order COVID PRE-OP / PRE-PROCEDURE SCREENING ORDER (NO ISOLATION) - Swab, Nasopharynx.  Procedure                               Abnormality         Status                     ---------                               -----------         ------                     COVID-19,BIOTAP, NP/OP S...[[id number]]                      Final result                 Please view results for these tests on the individual orders.    COVID-19,BIOTAP, NP/OP SWAB IN TRANSPORT MEDIA OR SALINE 24-36 HR TAT - Swab, Nasopharynx [[id number]] Collected: 09/29/20 1819    Specimen: Swab from Nasopharynx Updated: 09/30/20 1135     SARS-CoV-2 PCR Not Detected     Comment: Nucleic acid specific to SARS-CoV-2 (COVID-19) virus was not detected inthis sample by the TaqPath (TM) COVID-19 Combo Kit.SARS-CoV-2 (COVID-19) nucleic acid testing performed using [employer] Aptima (R) SARS-CoV-2 Assay or [employer] TaqPath   (TM)COVID-19 Combo  "Kit as indicated above under Emergency Use Authorization (EUA) from the FDA. Aptima (R) and TaqPath (TM) test performancecharacteristics verified by [facility] in accordance with the FDAs Guidance Document (Policy for Diagnostic   Tests for Coronavirus Disease-2019during the Public Health Emergency) issued on March 16, 2020. The laboratory is regulated under CLIA as qualified to perform high-complexity testingUnless otherwise noted, all testing was performed at [facility],   CLIA No. [id number], [place] State Licensee No. [id number]       Respiratory Culture - Sputum, Cough [[id number]] Collected: 09/30/20 1114    Specimen: Sputum from Cough Updated: 09/30/20 1123    Procalcitonin [[id number]]  (Normal) Collected: 09/30/20 0702    Specimen: Blood from Hand, Left Updated: 09/30/20 0810     Procalcitonin 0.06 ng/mL     Narrative:      As a Marker for Sepsis (Non-Neonates):   1. <0.5 ng/mL represents a low risk of severe sepsis and/or septic shock.  1. >2 ng/mL represents a high risk of severe sepsis and/or septic shock.    As a Marker for Lower Respiratory Tract Infections that require antibiotic therapy:  PCT on Admission     Antibiotic Therapy             6-12 Hrs later  > 0.5                Strongly Recommended            >0.25 - <0.5         Recommended  0.1 - 0.25           Discouraged                   Remeasure/reassess PCT  <0.1                 Strongly Discouraged          Remeasure/reassess PCT      As 28 day mortality risk marker: \"Change in Procalcitonin Result\" (> 80 % or <=80 %) if Day 0 (or Day 1) and Day 4 values are available. Refer to http://[email or web address]-pct-calculator.com/   Change in PCT <=80 %   A decrease of PCT levels below or equal to 80 % defines a positive change in PCT test result representing a higher risk for 28-day all-cause mortality of patients diagnosed with severe sepsis or septic shock.  Change in PCT > 80 %   A decrease of PCT levels of more than 80 % defines a negative change in PCT " "result representing a lower risk for 28-day all-cause mortality of patients diagnosed with severe sepsis or septic shock.                Results may be falsely decreased if patient taking Biotin.     TSH [[id number]]  (Normal) Collected: 09/30/20 0702    Specimen: Blood from Hand, Left Updated: 09/30/20 0805     TSH 0.801 uIU/mL     Basic Metabolic Panel [[id number]]  (Abnormal) Collected: 09/30/20 0702    Specimen: Blood from Hand, Left Updated: 09/30/20 0803     Glucose 235 mg/dL      BUN 16 mg/dL      Creatinine 0.84 mg/dL      Sodium 142 mmol/L      Potassium 4.1 mmol/L      Chloride 108 mmol/L      CO2 21.9 mmol/L      Calcium 8.3 mg/dL      eGFR Non African Amer 64 mL/min/1.73      BUN/Creatinine Ratio 19.0     Anion Gap 12.1 mmol/L     Narrative:      GFR Normal >60  Chronic Kidney Disease <60  Kidney Failure <15      Protime-INR [[id number]]  (Abnormal) Collected: 09/30/20 0702    Specimen: Blood from Hand, Left Updated: 09/30/20 0741     Protime 35.9 Seconds      INR 3.78    Procalcitonin [[id number]]  (Normal) Collected: 09/29/20 2004    Specimen: Blood Updated: 09/30/20 0024     Procalcitonin 0.05 ng/mL     Narrative:      As a Marker for Sepsis (Non-Neonates):   1. <0.5 ng/mL represents a low risk of severe sepsis and/or septic shock.  1. >2 ng/mL represents a high risk of severe sepsis and/or septic shock.    As a Marker for Lower Respiratory Tract Infections that require antibiotic therapy:  PCT on Admission     Antibiotic Therapy             6-12 Hrs later  > 0.5                Strongly Recommended            >0.25 - <0.5         Recommended  0.1 - 0.25           Discouraged                   Remeasure/reassess PCT  <0.1                 Strongly Discouraged          Remeasure/reassess PCT      As 28 day mortality risk marker: \"Change in Procalcitonin Result\" (> 80 % or <=80 %) if Day 0 (or Day 1) and Day 4 values are available. Refer to http://www.[employer]-pct-calculator.com/   Change in PCT <=80 %   A " decrease of PCT levels below or equal to 80 % defines a positive change in PCT test result representing a higher risk for 28-day all-cause mortality of patients diagnosed with severe sepsis or septic shock.  Change in PCT > 80 %   A decrease of PCT levels of more than 80 % defines a negative change in PCT result representing a lower risk for 28-day all-cause mortality of patients diagnosed with severe sepsis or septic shock.                Results may be falsely decreased if patient taking Biotin.     POC Glucose Once [[id number]]  (Normal) Collected: 09/30/20 0006    Specimen: Blood Updated: 09/30/20 0007     Glucose 118 mg/dL     Troponin [[id number]]  (Normal) Collected: 09/29/20 2004    Specimen: Blood Updated: 09/29/20 2041     Troponin T <0.010 ng/mL     Narrative:      Troponin T Reference Range:  <= 0.03 ng/mL-   Negative for AMI  >0.03 ng/mL-     Abnormal for myocardial necrosis.  Clinicians would have to utilize clinical acumen, EKG, Troponin and serial changes to determine if it is an Acute Myocardial Infarction or myocardial injury due to an underlying chronic condition.       Results may be falsely decreased if patient taking Biotin.      Blood Culture - Blood, Arm, Left [[id number]] Collected: 09/29/20 2004    Specimen: Blood from Arm, Left Updated: 09/29/20 2012    CBC & Differential [[id number]]  (Abnormal) Collected: 09/29/20 1755    Specimen: Blood Updated: 09/29/20 1928    Narrative:      The following orders were created for panel order CBC & Differential.  Procedure                               Abnormality         Status                     ---------                               -----------         ------                     CBC Auto Differential[[id number]]        Abnormal            Final result                 Please view results for these tests on the individual orders.    CBC Auto Differential [[id number]]  (Abnormal) Collected: 09/29/20 1755    Specimen: Blood Updated: 09/29/20 1928     WBC  24.44 10*3/mm3      RBC 4.22 10*6/mm3      Hemoglobin 12.7 g/dL      Hematocrit 39.2 %      MCV 92.9 fL      MCH 30.1 pg      MCHC 32.4 g/dL      RDW 12.7 %      RDW-SD 42.6 fl      MPV 10.0 fL      Platelets 158 10*3/mm3     Manual Differential [[id number]]  (Abnormal) Collected: 09/29/20 1755    Specimen: Blood Updated: 09/29/20 1928     Neutrophil % 28.0 %      Lymphocyte % 70.0 %      Monocyte % 1.0 %      Basophil % 1.0 %      Neutrophils Absolute 6.84 10*3/mm3      Lymphocytes Absolute 17.11 10*3/mm3      Monocytes Absolute 0.24 10*3/mm3      Basophils Absolute 0.24 10*3/mm3      RBC Morphology Normal     Smudge Cells Mod/2+     Platelet Morphology Normal    BNP [[id number]]  (Normal) Collected: 09/29/20 1755    Specimen: Blood Updated: 09/29/20 1847     proBNP 535.5 pg/mL     Narrative:      Among patients with dyspnea, NT-proBNP is highly sensitive for the detection of acute congestive heart failure. In addition NT-proBNP of <300 pg/ml effectively rules out acute congestive heart failure with 99% negative predictive value.    Results may be falsely decreased if patient taking Biotin.      Troponin [[id number]]  (Normal) Collected: 09/29/20 1755    Specimen: Blood Updated: 09/29/20 1847     Troponin T <0.010 ng/mL     Narrative:      Troponin T Reference Range:  <= 0.03 ng/mL-   Negative for AMI  >0.03 ng/mL-     Abnormal for myocardial necrosis.  Clinicians would have to utilize clinical acumen, EKG, Troponin and serial changes to determine if it is an Acute Myocardial Infarction or myocardial injury due to an underlying chronic condition.       Results may be falsely decreased if patient taking Biotin.      Comprehensive Metabolic Panel [[id number]]  (Abnormal) Collected: 09/29/20 1755    Specimen: Blood Updated: 09/29/20 1843     Glucose 131 mg/dL      BUN 16 mg/dL      Creatinine 1.00 mg/dL      Sodium 141 mmol/L      Potassium 3.2 mmol/L      Chloride 105 mmol/L      CO2 22.9 mmol/L      Calcium 9.1 mg/dL         Total Protein 6.4 g/dL      Albumin 3.90 g/dL      ALT (SGPT) 23 U/L      AST (SGOT) 17 U/L      Alkaline Phosphatase 105 U/L      Total Bilirubin 0.8 mg/dL      eGFR Non African Amer 53 mL/min/1.73      Globulin 2.5 gm/dL      A/G Ratio 1.6 g/dL      BUN/Creatinine Ratio 16.0     Anion Gap 13.1 mmol/L     Narrative:      GFR Normal >60  Chronic Kidney Disease <60  Kidney Failure <15      Lactic Acid, Plasma [[id number]]  (Normal) Collected: 09/29/20 1755    Specimen: Blood Updated: 09/29/20 1831     Lactate 1.0 mmol/L     D-dimer, Quantitative [[id number]]  (Abnormal) Collected: 09/29/20 1755    Specimen: Blood Updated: 09/29/20 1829     D-Dimer, Quantitative 0.66 MCGFEU/mL     Narrative:      The [employer] D-Dimer test used in conjunction with a clinical pretest probability (PTP) assessment model, has been approved by the FDA to rule out the presence of venous thromboembolism (VTE) in outpatients suspected of deep venous thrombosis (DVT) or pulmonary embolism (PE). The cut-off for negative predictive value is <0.50 MCGFEU/mL.    Protime-INR [[id number]]  (Abnormal) Collected: 09/29/20 1755    Specimen: Blood Updated: 09/29/20 1829     Protime 33.2 Seconds      INR 3.41    Blood Culture - Blood, Arm, Left [[id number]] Collected: 09/29/20 1755    Specimen: Blood from Arm, Left Updated: 09/29/20 1812    Blood Gas, Venous [[id number]]  (Abnormal) Collected: 09/29/20 1801    Specimen: Venous Blood Updated: 09/29/20 1804     Site RV     pH, Venous 7.431 pH Units      pCO2, Venous 34.2 mm Hg      pO2, Venous 40.6 mm Hg      HCO3, Venous 22.7 mmol/L      Base Excess, Venous -1.1 mmol/L      O2 Saturation Calculated 77.8 %      Barometric Pressure for Blood Gas 744.9 mmHg      Modality Room Air     Rate 26 Breaths/minute         Data Review:  Results from last 7 days   Lab Units 10/04/20  0619 10/03/20  0830 10/02/20  0747   SODIUM mmol/L 133* 140 139   POTASSIUM mmol/L 3.8 4.1 3.7   CHLORIDE mmol/L 96* 100 101   CO2 mmol/L  30.5* 29.6* 31.1*   BUN mg/dL 20 24* 25*   CREATININE mg/dL 0.80 0.90 0.87   GLUCOSE mg/dL 158* 155* 148*   CALCIUM mg/dL 8.3* 8.6 8.4*     Results from last 7 days   Lab Units 10/04/20  0619 10/03/20  0830 10/02/20  0747   WBC 10*3/mm3 15.08* 19.64* 22.64*   HEMOGLOBIN g/dL 11.2* 12.0 12.4   HEMATOCRIT % 35.1 39.1 38.8   PLATELETS 10*3/mm3 136* 162 158     Results from last 7 days   Lab Units 09/30/20  0702   TSH uIU/mL 0.801         Lab Results   Lab Value Date/Time    TROPONINT <0.010 09/29/2020 2004    TROPONINT <0.010 09/29/2020 1755    TROPONINT <0.010 08/29/2020 1036    TROPONINT <0.010 04/28/2020 1421    TROPONINT <0.010 04/23/2020 2143    TROPONINT 0.014 02/11/2020 1748    TROPONINT <0.010 09/16/2019 1207    TROPONINT 0.12 (C) 03/27/2014 0955    TROPONINT 0.14 (C) 03/27/2014 0355    TROPONINT 0.14 (C) 03/26/2014 1755    TROPONINT 0.15 (C) 03/26/2014 0349         Results from last 7 days   Lab Units 10/02/20  0747 10/01/20  0710 09/29/20  1755   ALK PHOS U/L 106 91 105   BILIRUBIN mg/dL 0.5 0.4 0.8   ALT (SGPT) U/L 25 19 23   AST (SGOT) U/L 22 16 17     Results from last 7 days   Lab Units 09/30/20  0702   TSH uIU/mL 0.801         Glucose   Date/Time Value Ref Range Status   10/04/2020 1127 181 (H) 70 - 130 mg/dL Final   10/03/2020 1958 160 (H) 70 - 130 mg/dL Final   10/03/2020 1644 140 (H) 70 - 130 mg/dL Final   10/03/2020 1235 153 (H) 70 - 130 mg/dL Final   10/03/2020 0612 153 (H) 70 - 130 mg/dL Final   10/02/2020 2036 219 (H) 70 - 130 mg/dL Final   10/02/2020 1647 136 (H) 70 - 130 mg/dL Final   10/02/2020 1131 168 (H) 70 - 130 mg/dL Final     Results from last 7 days   Lab Units 10/04/20  0619 10/03/20  0830 10/02/20  0747   INR  1.70* 1.98* 2.31*       Past Medical History:   Diagnosis Date   • Aortic calcification (CMS/HCC)     mild, on echo 12/17/2017   • Aortic regurgitation     Trace   • Asthma    • Atrial fibrillation (CMS/HCC)    • CAD (coronary artery disease)    • Chronic combined systolic and  diastolic congestive heart failure (CMS/HCC)    • CKD (chronic kidney disease) stage 3, GFR 30-59 ml/min    • Coronary artery disease involving native coronary artery of native heart with angina pectoris (CMS/HCC)    • Disc degeneration, lumbar    • DM type 2 (diabetes mellitus, type 2) (CMS/HCC)    • GERD (gastroesophageal reflux disease)    • History of aneurysm     right femoral artery s/p LHC   • History of blood transfusion    • History of fracture    • History of vitamin D deficiency    • Hyperlipidemia    • Hypertension    • Mild mitral regurgitation    • Mitral annular calcification     12/8/2017- echo, moderate   • PAF (paroxysmal atrial fibrillation) (CMS/HCC)    • Peripheral neuropathy    • Skin cancer     Left hand   • Sleep apnea    • SSS (sick sinus syndrome) (CMS/HCC)    • Stroke (cerebrum) (CMS/HCC)    • Tricuspid regurgitation     Trace       Assessment:  Active Hospital Problems    Diagnosis  POA   • **Chronic bronchitis (CMS/HCC) [J42]  Yes   • Pneumonia due to gram-negative bacteria (CMS/[facility]) [J15.6]  Unknown   • Closed wedge compression fracture of T5 vertebra (CMS/[facility]) [S22.050A]  Unknown   • Dyspnea [R06.00]  Yes   • Anticoagulated on Coumadin [Z79.01]  Not Applicable   • CLL (chronic lymphoid leukemia) in relapse (CMS/[facility]) [C91.92]  Yes   • Rhinovirus [B34.8]  Yes   • Morbid obesity (CMS/[facility]) [E66.01]  Yes   • HTN (hypertension) [I10]  Yes   • CKD (chronic kidney disease), stage III [N18.30]  Yes   • DM II (diabetes mellitus, type II), controlled (CMS/[facility]) [E11.9]  Yes   • Paroxysmal atrial fibrillation (CMS/[facility]) [I48.0]  Yes      Resolved Hospital Problems   No resolved problems to display.       Plan:  Continue the nebs and O 2, await pulmonary. Follow lab.  Antibiotics for PNA    Saman Cruz MD  10/4/2020  13:26 EDT

## 2020-10-04 NOTE — PROGRESS NOTES
"  PROGRESS NOTE  Patient Name: Caitlyn Longoria  Age/Sex: 85 y.o. female  : 1934  MRN: [id number]    Date of Admission: 2020  Date of Encounter Visit: 10/04/20   LOS: 5 days   Patient Care Team:  Zenaida Suarez MD as PCP - General (Internal Medicine)  Juno Coburn MD as PCP - Claims Attributed  Pao Levi [facility] as Pharmacist  Alexander Valiente [facility] as Pharmacist (Pharmacy)  Zenaida Suarez MD as Referring Physician (Internal Medicine)  Lucien Larkin MD as Consulting Physician (Hematology and Oncology)    Chief Complaint: Improving cough and dyspnea    Hospital course: Patient had heavy growth of Pseudomonas on her sputum culture, had evidence of pneumonia on the CAT scan     Interval History: Afebrile, less cough and less wheezing and less shortness of breath however patient is still far from her baseline, and still requiring frequent nebulizer treatment almost every 4 hours.    REVIEW OF SYSTEMS:   CONSTITUTIONAL: no fever or chills  CARDIOVASCULAR: No angina type chest pain, positive chest pain from the rib fracture however, chest pressure or chest discomfort. No palpitations or edema.   RESPIRATORY: Improving dyspnea, no productive cough, still dependent on frequent nebulizer treatment  GASTROINTESTINAL: No anorexia, nausea, vomiting or diarrhea. No abdominal pain or blood.   HEMATOLOGIC: No bleeding or bruising.     Ventilator/Non-Invasive Ventilation Settings (From admission, onward)    None            Vital Signs  Temp:  [97.4 °F (36.3 °C)-98.1 °F (36.7 °C)] 97.4 °F (36.3 °C)  Heart Rate:  [79-95] 82  Resp:  [16-22] 20  BP: (111-121)/(66-80) 119/70  SpO2:  [91 %-98 %] 94 %  on  Flow (L/min):  [2] 2 Device (Oxygen Therapy): room air    Intake/Output Summary (Last 24 hours) at 10/4/2020 1210  Last data filed at 10/4/2020 0900  Gross per 24 hour   Intake 960 ml   Output 1900 ml   Net -940 ml     Flowsheet Rows      First Filed Value   Admission Height  157.5 cm (62\") " Documented at 09/29/2020 2223   Admission Weight  95.7 kg (211 lb) Documented at 09/29/2020 2223        Body mass index is 38.59 kg/m².      09/29/20 2223   Weight: 95.7 kg (211 lb)       Physical Exam:  GEN:  No acute distress, alert, cooperative, well developed, on nasal cannula oxygen  EYES:   Sclerae clear. No icterus. PERRL. Normal EOM  ENT:   External ears/nose normal, no oral lesions, no thrush, mucous membranes moist  NECK:  Supple, midline trachea, no JVD  LUNGS: Normal chest on inspection, no obvious crackles, positive rhonchi.  The breathing is nonlabored.   CV:  Regular rhythm and rate. Normal S1/S2. No murmurs, gallops, or rubs noted.  ABD:  Soft, nontender and nondistended. Normal bowel sounds. No guarding  EXT:  Moves all extremities well. No cyanosis. No redness.  Positive edema.   Skin: Dry, intact, no bleeding    Results Review:    Results From Last 14 Days   Lab Units 09/29/20  1755   D DIMER QUANT MCGFEU/mL 0.66*   LACTATE mmol/L 1.0     Results from last 7 days   Lab Units 10/04/20  0619 10/03/20  0830 10/02/20  0747 10/01/20  0710 09/30/20  0702 09/29/20  1755   SODIUM mmol/L 133* 140 139 140 142 141   POTASSIUM mmol/L 3.8 4.1 3.7 3.9 4.1 3.2*   CHLORIDE mmol/L 96* 100 101 103 108* 105   CO2 mmol/L 30.5* 29.6* 31.1* 27.4 21.9* 22.9   BUN mg/dL 20 24* 25* 18 16 16   CREATININE mg/dL 0.80 0.90 0.87 0.90 0.84 1.00   CALCIUM mg/dL 8.3* 8.6 8.4* 8.5* 8.3* 9.1   AST (SGOT) U/L  --   --  22 16  --  17   ALT (SGPT) U/L  --   --  25 19  --  23   ANION GAP mmol/L 6.5 10.4 6.9 9.6 12.1 13.1   ALBUMIN g/dL  --   --  3.80 3.50  --  3.90     Results from last 7 days   Lab Units 09/29/20 2004 09/29/20  1755   TROPONIN T ng/mL <0.010 <0.010   D DIMER QUANT MCGFEU/mL  --  0.66*     Results from last 7 days   Lab Units 09/30/20  0702   TSH uIU/mL 0.801     Results from last 7 days   Lab Units 09/29/20  1755   PROBNP pg/mL 535.5     Results from last 7 days   Lab Units 10/04/20  0619 10/03/20  0830  10/02/20  0747 10/01/20  0710 09/29/20  1755   WBC 10*3/mm3 15.08* 19.64* 22.64* 23.36* 24.44*   HEMOGLOBIN g/dL 11.2* 12.0 12.4 11.3* 12.7   HEMATOCRIT % 35.1 39.1 38.8 35.1 39.2   PLATELETS 10*3/mm3 136* 162 158 145 158   MCV fL 96.4 97.5* 94.4 94.1 92.9     Results from last 7 days   Lab Units 10/04/20  0619 10/03/20  0830 10/02/20  0747   INR  1.70* 1.98* 2.31*               Invalid input(s): LDLCALC          Glucose   Date/Time Value Ref Range Status   10/04/2020 1127 181 (H) 70 - 130 mg/dL Final   10/03/2020 1958 160 (H) 70 - 130 mg/dL Final   10/03/2020 1644 140 (H) 70 - 130 mg/dL Final   10/03/2020 1235 153 (H) 70 - 130 mg/dL Final   10/03/2020 0612 153 (H) 70 - 130 mg/dL Final   10/02/2020 2036 219 (H) 70 - 130 mg/dL Final   10/02/2020 1647 136 (H) 70 - 130 mg/dL Final   10/02/2020 1131 168 (H) 70 - 130 mg/dL Final     Results from last 7 days   Lab Units 10/02/20  0747 09/30/20  0702 09/29/20 2004 09/29/20  1755   PROCALCITONIN ng/mL 0.07 0.06 0.05  --    LACTATE mmol/L  --   --   --  1.0     Results from last 7 days   Lab Units 09/30/20  1114 09/29/20 2004 09/29/20  1755   BLOODCX   --  No growth at 4 days No growth at 4 days   RESPCX  Heavy growth (4+) Pseudomonas aeruginosa*  No Normal Respiratory Milana*  --   --          Results from last 7 days   Lab Units 10/01/20  1558   ADENOVIRUS DETECTION BY PCR  Not Detected   CORONAVIRUS 229E  Not Detected   CORONAVIRUS HKU1  Not Detected   CORONAVIRUS NL63  Not Detected   CORONAVIRUS OC43  Not Detected   HUMAN METAPNEUMOVIRUS  Not Detected   HUMAN RHINOVIRUS/ENTEROVIRUS  Detected*   INFLUENZA B PCR  Not Detected   PARAINFLUENZA 1  Not Detected   PARAINFLUENZA VIRUS 2  Not Detected   PARAINFLUENZA VIRUS 3  Not Detected   PARAINFLUENZA VIRUS 4  Not Detected   BORDETELLA PERTUSSIS PCR  Not Detected   BORDETELLA PARAPERTUSSIS PCR  Not Detected   [id number]  Not Detected   CHLAMYDOPHILA PNEUMONIAE PCR  Not Detected   MYCOPLAMA PNEUMO PCR  Not Detected    INFLUENZA A H3  Not Detected   INFLUENZA A H1  Not Detected   RSV, PCR  Not Detected               Imaging:   Imaging Results (All)     Procedure Component Value Units Date/Time    XR Spine Thoracic 2 View [[id number]] Collected: 09/30/20 1040     Updated: 09/30/20 1055    Narrative:      Thoracic spine radiograph     HISTORY:Mid thoracic spine pain after cough     TECHNIQUE: AP, lateral and swimmer's views of the thoracic spine     COMPARISON:CTA chest 09/29/2020 and 09/16/2019       Impression:      FINDINGS AND IMPRESSION:  Left-sided pacemaker is incompletely visualized.     Extensive bony demineralization limits evaluation. There is a  compression deformity of the T5 vertebral body, best seen on recent CTA  chest resulting in approximately 20% loss of height which is new since  09/16/2019. Findings are concerning for an acute fracture given patient  history cervical and however findings remain overall age-indeterminate  and correlation with patient history point tenderness in this area is  recommended with follow-up MRI for more exact characterization of  clinically indicated.     Multilevel moderate to severe degenerative changes are present within  the thoracic and visualized upper lumbar spine, best seen on recent CTA.     This report was finalized on 9/30/2020 10:52 AM by Dr. Alberto Mcfarland M.D.       CT Angiogram Chest [[id number]] Collected: 09/29/20 2218     Updated: 09/29/20 2232    Narrative:      CT ANGIOGRAM CHEST     HISTORY: Extensive medical history. Shortness of breath. Evaluate for  pulmonary embolism.     TECHNIQUE: CT angiogram the chest with multiplanar and three-dimensional  reformatted images, produced a separate workstation, was performed using  95 mL of Isovue-370 contrast. This is correlated with CT angiogram chest  09/16/2019 and chest x-ray from earlier today.     Radiation dose reduction techniques were utilized, including automated  exposure control and exposure modulation based on  body size.     FINDINGS: The pulmonary arteries are normal in caliber and enhance  normally. The thoracic aorta is faintly opacified with contrast and  there is no evidence of dissection. There is atheromatous vascular  calcification of the coronary arteries and elsewhere. There is a cardiac  pacing device. Chronic atelectasis is observed in the left lung base  with elevation of the left diaphragm. There is also atelectasis in the  posteromedial right lower lobe with volume loss and some secretions  within peripheral right lower lobe airways. The other airways are clear.  There is no pleural or pericardial effusion. Visualized upper abdominal  solid organs appear normal. There is some opacity in the right lower  lobe without significant volume loss which may represent a small area of  pneumonia. Extensive degenerative changes are observed in the spine. The  thoracic esophagus appears normal.       Impression:      No evidence of pulmonary thromboembolism. There is a new  infiltrate and atelectasis in the posterior right lower lobe where there  are some secretions opacifying several peripheral bronchioles. No other  acute appearing abnormality is present.      This report was finalized on 9/29/2020 10:29 PM by Dr. Jonah Kilgore M.D.       XR Chest 2 View [[id number]] Collected: 09/29/20 1941     Updated: 09/29/20 1952    Narrative:      TWO-VIEW CHEST     HISTORY: Cough and wheezing.     FINDINGS: The lungs are moderately well-expanded with prominent  elevation of the left hemidiaphragm unchanged from 08/29/2020. The heart  remains enlarged with a pacemaker in place and no acute abnormality is  seen.     This report was finalized on 9/29/2020 7:49 PM by Dr. Guille Trotter M.D.             I reviewed the patient's new clinical results.  I personally viewed and interpreted the patient's imaging results:        Medication Review:   aspirin, 81 mg, Oral, Daily  budesonide-formoterol, 2 puff, Inhalation, BID -  RT  carvedilol, 3.125 mg, Oral, BID  cephalexin, 500 mg, Oral, TID  fluticasone, 1 spray, Nasal, BID  furosemide, 20 mg, Oral, QAM  glipizide, 2.5 mg, Oral, QAM  insulin lispro, 0-14 Units, Subcutaneous, TID AC  ipratropium-albuterol, 3 mL, Nebulization, 4x Daily - RT  losartan, 25 mg, Oral, Daily  montelukast, 10 mg, Oral, Nightly  oxazepam, 10 mg, Oral, Nightly  oxybutynin XL, 10 mg, Oral, Nightly  pantoprazole, 40 mg, Oral, Daily  rosuvastatin, 20 mg, Oral, Nightly  sodium chloride, 10 mL, Intravenous, Q12H  sodium chloride, 4 mL, Nebulization, BID  tobramycin, 300 mg, Nebulization, BID - RT  warfarin (COUMADIN) (dosing per levels), , Does not apply, Daily        Pharmacy to dose warfarin,         ASSESSMENT:   1. COPD/asthma with mild exacerbation  2. Right lower lobe pneumonia with heavy growth of Pseudomonas on cultures  3. Chronic bronchitis  4. Acute hypoxemic respiratory failure  5. Elevated left hemidiaphragm: Chronic  6. Ischemic cardiomyopathy  7. Diastolic dysfunction  8. Mitral regurgitation  9. Atrial fibrillation and sick sinus syndrome  10. CLL  11. 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 Hypertension  13. Diabetes  14. CHARLENE on BiPAP  15. Immunodeficiency    PLAN:  With her current immunocompromise status, with the heavy growth of tobramycin and with her initial presentation, the decision was made to treat the patient with 2 weeks of nebulized tobramycin, the systemic antibiotic were changed back to her initial home regimen for her previous chronic infection.  Even though the patient is reporting feeling better however she is still very symptomatic and still dependent on the frequent nebulizer treatment.  Discussed with the patient the rationale for the treatment plan and updated her about the conversation with the infectious disease team, notes of other providers were reviewed.      Disposition: Home    Juno Coburn MD  10/04/20  12:10 EDT            Dictated utilizing Dragon dictation

## 2020-10-04 NOTE — PROGRESS NOTES
Pharmacy Consult: Warfarin Dosing/ Monitoring    Caitlyn Longoria is a 85 y.o. female, estimated creatinine clearance is 55.4 mL/min (by C-G formula based on SCr of 0.8 mg/dL). weighing 95.7 kg (211 lb).     has a past medical history of Aortic calcification (CMS/HCC), Aortic regurgitation, Asthma, Atrial fibrillation (CMS/HCC), CAD (coronary artery disease), Chronic combined systolic and diastolic congestive heart failure (CMS/HCC), CKD (chronic kidney disease) stage 3, GFR 30-59 ml/min, Coronary artery disease involving native coronary artery of native heart with angina pectoris (CMS/HCC), Disc degeneration, lumbar, DM type 2 (diabetes mellitus, type 2) (CMS/[facility]), GERD (gastroesophageal reflux disease), History of aneurysm, History of blood transfusion, History of fracture, History of vitamin D deficiency, Hyperlipidemia, Hypertension, Mild mitral regurgitation, Mitral annular calcification, PAF (paroxysmal atrial fibrillation) (CMS/[facility]), Peripheral neuropathy, Skin cancer, Sleep apnea, SSS (sick sinus syndrome) (CMS/[facility]), Stroke (cerebrum) (CMS/[facility]), and Tricuspid regurgitation.    Social History     Tobacco Use    Smoking status: Never Smoker    Smokeless tobacco: Never Used    Tobacco comment: caffeine use- soda   Substance Use Topics    Alcohol use: Not Currently    Drug use: No       Results from last 7 days   Lab Units 10/04/20  0619 10/03/20  0830 10/02/20  0747 10/01/20  0710 09/30/20  0702 09/29/20  1755   INR  1.70* 1.98* 2.31* 3.60* 3.78* 3.41*   HEMOGLOBIN g/dL 11.2* 12.0 12.4 11.3*  --  12.7   HEMATOCRIT % 35.1 39.1 38.8 35.1  --  39.2   PLATELETS 10*3/mm3 136* 162 158 145  --  158     Results from last 7 days   Lab Units 10/04/20  0619 10/03/20  0830 10/02/20  0747 10/01/20  0710  09/29/20  1755   SODIUM mmol/L 133* 140 139 140   < > 141   POTASSIUM mmol/L 3.8 4.1 3.7 3.9   < > 3.2*   CHLORIDE mmol/L 96* 100 101 103   < > 105   CO2 mmol/L 30.5* 29.6* 31.1* 27.4   < > 22.9   BUN mg/dL 20 24* 25* 18    < > 16   CREATININE mg/dL 0.80 0.90 0.87 0.90   < > 1.00   CALCIUM mg/dL 8.3* 8.6 8.4* 8.5*   < > 9.1   BILIRUBIN mg/dL  --   --  0.5 0.4  --  0.8   ALK PHOS U/L  --   --  106 91  --  105   ALT (SGPT) U/L  --   --  25 19  --  23   AST (SGOT) U/L  --   --  22 16  --  17   GLUCOSE mg/dL 158* 155* 148* 189*   < > 131*    < > = values in this interval not displayed.     Anticoagulation history: h/o atrial fibrillation managed on warfarin through [facility]. As of 9/23 regimen noted as: Warfarin 2mg Su/T/W/Th/Sa and Warfarin 4mg M/F     Hospital Anticoagulation:  Consulting provider: Dr. Yoo  Start date: 9/30  Indication: Atrial Fibrillation  Target INR: 2-3  Expected duration: Indefinite   Bridge Therapy: No                  Date 9/29 9/30  10/1 10/2  10/3  10/4           INR 3.41 3.78  3.6 2.31  1.98  1.70           Warfarin dose 0 0 0  2  mg  3mg  3mg              Potential drug interactions:    d/c'ed 10/2  Crestor (home med, Moderate): May enhance anticoagulant effect of warfarin  Pantoprazole (Moderate, home med): May enhance anticoagulant effect of warfarin.  Acetaminophen (prn) - may result in increased risk of bleeding     -- once dose doxy / ctx on 9/29      Relevant nutrition status: Reg diet, % of intake charted yesterday/today     Other:      Education complete?/ Date: Not at this time     Assessment/Plan:  INR slightly subtherapeutic, likely reflective of 3 held doses.   Give 3mg warfarin again today. Anticipate a regimen of 2 mg daily as this would be a ~23% reduction from previous home regimen which produced a supratherapeutic INR.  Monitor INR, DDI, s/s bleeding    Pharmacy will continue to follow until discharge or discontinuation of warfarin.     Sonia Cortez RPH  10/4/2020

## 2020-10-04 NOTE — PLAN OF CARE
Problem: Adult Inpatient Plan of Care  Goal: Plan of Care Review  Flowsheets  Taken 10/4/2020 1234  Progress: improving  Plan of Care Reviewed With: patient  Outcome Summary:  Patient participated well with skilled PT. She did not ambulate with O2 this visit 94% when returning back to room. Ambulated with TLSO on. Will continue to progress patient towards goals. PT wore gloves, mask, and face shield.  Taken 10/4/2020 1232  Progress: improving  Plan of Care Reviewed With: patient

## 2020-10-04 NOTE — PLAN OF CARE
Goal Outcome Evaluation:  Plan of Care Reviewed With: patient  Progress: improving   VSS. Back pain controlled with Lortab. Back brace in place until she went to bed. Started breathing treatments with Tobramycin and patient states she feels better after the treatment. Remains on RA with O2 sats >92%.  WCM.

## 2020-10-04 NOTE — THERAPY TREATMENT NOTE
Patient Name: Caitlyn Longoria  : 1934    MRN: [id number]                              Today's Date: 10/4/2020       Admit Date: 2020    Visit Dx:     ICD-10-CM ICD-9-CM   1. Dyspnea, unspecified type  R06.00 786.09   2. Pneumonia due to infectious organism, unspecified laterality, unspecified part of lung  J18.9 486   3. Leukocytosis, unspecified type  D72.829 288.60     Patient Active Problem List   Diagnosis   • Neck and shoulder pain   • Arthropathy of shoulder region   • Carpal tunnel syndrome of left wrist   • Chronic pain of both shoulders   • Chronic left shoulder pain   • Arthropathy of left shoulder   • Dysarthria   • DM II (diabetes mellitus, type II), controlled (CMS/[facility])   • Paroxysmal atrial fibrillation (CMS/[facility])   • HLD (hyperlipidemia)   • Chronic bronchitis (CMS/[facility])   • Coronary artery disease involving native coronary artery of native heart with angina pectoris (CMS/[facility])   • CVA (cerebral vascular accident) (CMS/[facility])   • HTN (hypertension)   • CKD (chronic kidney disease), stage III   • Chronic combined systolic and diastolic congestive heart failure (CMS/[facility])   • Infection of prosthetic right knee joint (CMS/[facility])   • Stasis dermatitis of right lower extremity due to peripheral venous hypertension   • Current use of long term anticoagulation   • Morbid obesity (CMS/[facility])   • Acute respiratory failure with hypoxia (CMS/[facility])   • Rhinovirus   • Asthma with acute exacerbation   • Acute respiratory failure with hypoxemia (CMS/[facility])   • Viral pneumonia   • Hypoxia   • CLL (chronic lymphoid leukemia) in relapse (CMS/[facility])   • Dyspnea   • Anticoagulated on Coumadin   • Closed wedge compression fracture of T5 vertebra (CMS/[facility])   • Pneumonia due to gram-negative bacteria (CMS/[facility])     Past Medical History:   Diagnosis Date   • Aortic calcification (CMS/[facility])     mild, on echo 2017   • Aortic regurgitation     Trace   • Asthma    • Atrial fibrillation (CMS/[facility])    • CAD (coronary artery  disease)    • Chronic combined systolic and diastolic congestive heart failure (CMS/[facility])    • CKD (chronic kidney disease) stage 3, GFR 30-59 ml/min    • Coronary artery disease involving native coronary artery of native heart with angina pectoris (CMS/[facility])    • Disc degeneration, lumbar    • DM type 2 (diabetes mellitus, type 2) (CMS/[facility])    • GERD (gastroesophageal reflux disease)    • History of aneurysm     right femoral artery s/p LHC   • History of blood transfusion    • History of fracture    • History of vitamin D deficiency    • Hyperlipidemia    • Hypertension    • Mild mitral regurgitation    • Mitral annular calcification     12/8/2017- echo, moderate   • PAF (paroxysmal atrial fibrillation) (CMS/[facility])    • Peripheral neuropathy    • Skin cancer     Left hand   • Sleep apnea    • SSS (sick sinus syndrome) (CMS/[facility])    • Stroke (cerebrum) (CMS/[facility])    • Tricuspid regurgitation     Trace     Past Surgical History:   Procedure Laterality Date   • CARDIAC CATHETERIZATION     • CARDIAC ELECTROPHYSIOLOGY PROCEDURE N/A 2/7/2020    Procedure: PPM generator change - dual  medtronic;  Surgeon: Kiel Field MD;  Location: [facility] INVASIVE LOCATION;  Service: Cardiology;  Laterality: N/A;   • CHOLECYSTECTOMY     • CORONARY STENT PLACEMENT     • HERNIA REPAIR      hital hernia   • HYSTERECTOMY     • PACEMAKER IMPLANTATION     • REPLACEMENT TOTAL KNEE Bilateral      General Information     Row Name 10/04/20 1231          Physical Therapy Time and Intention    Document Type  therapy note (daily note)  -AL     Mode of Treatment  physical therapy  -AL       User Key  (r) = Recorded By, (t) = Taken By, (c) = Cosigned By    Initials Name Provider Type    AL Sonia Galan PT Physical Therapist        Mobility     Row Name 10/04/20 1231          Bed Mobility    Bed Mobility  supine-sit;sit-supine  -AL     Supine-Sit [place] (Bed Mobility)  not tested Up in chair.  -AL     Sit-Supine [place] (Bed  Mobility)  not tested Up in chair.  -AL     Row Name 10/04/20 1231          Sit-Stand Transfer    Sit-Stand [place] (Transfers)  supervision  -AL     Assistive Device (Sit-Stand Transfers)  walker, front-wheeled  -AL     Row Name 10/04/20 1231          Gait/Stairs (Locomotion)    [place] Level (Gait)  supervision  -AL     Assistive Device (Gait)  walker, front-wheeled  -AL     Distance in Feet (Gait)  170  -AL     Number of Steps (Stairs)  no LOB, no O2.  -AL       User Key  (r) = Recorded By, (t) = Taken By, (c) = Cosigned By    Initials Name Provider Type    AL Sonia Galan, PT Physical Therapist        Obj/Interventions    No documentation.       Goals/Plan    No documentation.       Clinical Impression     Row Name 10/04/20 1232          Pain    Additional Documentation  Pain Scale: Numbers Pre/Post-Treatment (Group)  -AL     Row Name 10/04/20 1232          Pain Scale: Numbers Pre/Post-Treatment    Pretreatment Pain Rating  4/10  -AL     Posttreatment Pain Rating  4/10  -AL     Pain Location - Side  Bilateral  -AL     Pain Location - Orientation  posterior  -AL     Pain Location  back  -AL     Pain Intervention(s)  Ambulation/increased activity;Repositioned  -AL     Row Name 10/04/20 1232          Plan of Care Review    Plan of Care Reviewed With  patient  -AL     Progress  improving  -AL     Row Name 10/04/20 1232          Therapy Assessment/Plan (PT)    Rehab Potential (PT)  good, to achieve stated therapy goals  -AL     Criteria for Skilled Interventions Met (PT)  skilled treatment is necessary  -AL     Row Name 10/04/20 1232          Positioning and Restraints    Pre-Treatment Position  sitting in chair/recliner  -AL     Post Treatment Position  chair  -AL     In Chair  reclined;sitting;call light within reach  -AL       User Key  (r) = Recorded By, (t) = Taken By, (c) = Cosigned By    Initials Name Provider Type    Sonia Parra, PT Physical Therapist        Outcome Measures     Row Name  10/04/20 1233          How much help from another person do you currently need...    Turning from your back to your side while in flat bed without using bedrails?  3  -AL     Moving from lying on back to sitting on the side of a flat bed without bedrails?  3  -AL     Moving to and from a bed to a chair (including a wheelchair)?  3  -AL     Standing up from a chair using your arms (e.g., wheelchair, bedside chair)?  3  -AL     Climbing 3-5 steps with a railing?  2  -AL     To walk in hospital room?  3  -AL     AM-PAC 6 Clicks Score (PT)  17  -AL     Row Name 10/04/20 1233          Functional Assessment    Outcome Measure Options  AM-PAC 6 Clicks Basic Mobility (PT)  -AL       User Key  (r) = Recorded By, (t) = Taken By, (c) = Cosigned By    Initials Name Provider Type    Sonia Parra, PT Physical Therapist        Physical Therapy Education                 Title: PT OT SLP Therapies (Done)     Topic: Physical Therapy (Done)     Point: Mobility training (Done)     Learning Progress Summary           Patient Acceptance, E, VU by AL at 10/4/2020 1234    Acceptance, E,TB, VU,DU by JANY at 10/3/2020 1243    Eager, E,TB,D, VU,NR by LIBRA at 10/2/2020 1717    Eager, TB, DU,NR by MARIBELL at 10/1/2020 1558    Comment: review TLSO                   Point: Home exercise program (Done)     Learning Progress Summary           Patient Acceptance, E, VU by AL at 10/4/2020 1234    Acceptance, E,TB, VU,DU by LB at 10/3/2020 1243    Eager, E,TB,D, VU,NR by LIBRA at 10/2/2020 1717    Eager, TB, DU,NR by MARIBELL at 10/1/2020 1558    Comment: review TLSO                   Point: Body mechanics (Done)     Learning Progress Summary           Patient Acceptance, E, VU by AL at 10/4/2020 1234    Acceptance, E,TB, VU,DU by LB at 10/3/2020 1243    Eager, E,TB,D, VU,NR by  at 10/2/2020 1717    Eager, TB, DU,NR by MARIBELL at 10/1/2020 1558    Comment: review TLSO                   Point: Precautions (Done)     Learning Progress Summary           Patient  Acceptance, E, VU by AL at 10/4/2020 1234    Acceptance, E,TB, VU,DU by LB at 10/3/2020 1243    Eager, EILEEN,TB,D, VU,NR by  at 10/2/2020 1717    LACIE Tidwell, STEFANIE,NR by DJ at 10/1/2020 1558    Comment: review TLSO                               User Key     Initials Effective Dates Name Provider Type Discipline    AL 04/03/18 -  Sonia Galan, PT Physical Therapist PT    LIBRA 03/07/18 -  Jaylin Saunders PTA Physical Therapy Assistant PT    JANY 08/09/20 -  Rosalinda Lawrence, CHERRY Physical Therapist PT    MARIBELL 10/25/19 -  Edie Lowry, PT Physical Therapist PT              PT Recommendation and Plan     Plan of Care Reviewed With: patient  Progress: improving  Outcome Summary: Patient participated well with skilled PT. She did not ambulate with O2 this visit; 94% when returning back to room. Ambulated with TLSO on. Will continue to progress patient towards goals.     Time Calculation:   PT Charges     Row Name 10/04/20 1233             Time Calculation    Start Time  1219  -AL      Stop Time  1229  -AL      Time Calculation (min)  10 min  -AL      PT Received On  10/04/20  -AL      PT - Next Appointment  10/05/20  -AL        User Key  (r) = Recorded By, (t) = Taken By, (c) = Cosigned By    Initials Name Provider Type    AL Sonia Galan, PT Physical Therapist        Therapy Charges for Today     Code Description Service Date Service Provider Modifiers Qty    [id number] HC PT THER PROC EA 15 MIN 10/4/2020 Sonia Galan, PT GP 1          PT G-Codes  Outcome Measure Options: AM-PAC 6 Clicks Basic Mobility (PT)  AM-PAC 6 Clicks Score (PT): 17    Sonia Galan, PT  10/4/2020

## 2020-10-05 LAB
ANION GAP SERPL CALCULATED.3IONS-SCNC: 10.5 MMOL/L (ref 5–15)
BUN SERPL-MCNC: 18 MG/DL (ref 8–23)
BUN/CREAT SERPL: 21.2 (ref 7–25)
CALCIUM SPEC-SCNC: 8.5 MG/DL (ref 8.6–10.5)
CHLORIDE SERPL-SCNC: 99 MMOL/L (ref 98–107)
CO2 SERPL-SCNC: 26.5 MMOL/L (ref 22–29)
CREAT SERPL-MCNC: 0.85 MG/DL (ref 0.57–1)
DEPRECATED RDW RBC AUTO: 40.8 FL (ref 37–54)
ERYTHROCYTE [DISTWIDTH] IN BLOOD BY AUTOMATED COUNT: 12.4 % (ref 12.3–15.4)
GFR SERPL CREATININE-BSD FRML MDRD: 64 ML/MIN/1.73
GLUCOSE BLDC GLUCOMTR-MCNC: 164 MG/DL (ref 70–130)
GLUCOSE BLDC GLUCOMTR-MCNC: 172 MG/DL (ref 70–130)
GLUCOSE BLDC GLUCOMTR-MCNC: 207 MG/DL (ref 70–130)
GLUCOSE SERPL-MCNC: 157 MG/DL (ref 65–99)
HCT VFR BLD AUTO: 34.3 % (ref 34–46.6)
HGB BLD-MCNC: 11.2 G/DL (ref 12–15.9)
HYPOCHROMIA BLD QL: ABNORMAL
INR PPP: 1.59 (ref 0.9–1.1)
LYMPHOCYTES # BLD MANUAL: 8.87 10*3/MM3 (ref 0.7–3.1)
LYMPHOCYTES NFR BLD MANUAL: 57.1 % (ref 19.6–45.3)
MCH RBC QN AUTO: 30 PG (ref 26.6–33)
MCHC RBC AUTO-ENTMCNC: 32.7 G/DL (ref 31.5–35.7)
MCV RBC AUTO: 92 FL (ref 79–97)
NEUTROPHILS # BLD AUTO: 6.66 10*3/MM3 (ref 1.7–7)
NEUTROPHILS NFR BLD MANUAL: 42.9 % (ref 42.7–76)
PLAT MORPH BLD: NORMAL
PLATELET # BLD AUTO: 150 10*3/MM3 (ref 140–450)
PMV BLD AUTO: 9.5 FL (ref 6–12)
POTASSIUM SERPL-SCNC: 3.5 MMOL/L (ref 3.5–5.2)
PROTHROMBIN TIME: 18.7 SECONDS (ref 11.7–14.2)
RBC # BLD AUTO: 3.73 10*6/MM3 (ref 3.77–5.28)
SODIUM SERPL-SCNC: 136 MMOL/L (ref 136–145)
WBC # BLD AUTO: 15.53 10*3/MM3 (ref 3.4–10.8)
WBC MORPH BLD: NORMAL

## 2020-10-05 PROCEDURE — 63710000001 TOBRAMYCIN PER 300 MG: Performed by: INTERNAL MEDICINE

## 2020-10-05 PROCEDURE — 63710000001 INSULIN LISPRO (HUMAN) PER 5 UNITS: Performed by: HOSPITALIST

## 2020-10-05 PROCEDURE — 85007 BL SMEAR W/DIFF WBC COUNT: CPT | Performed by: HOSPITALIST

## 2020-10-05 PROCEDURE — 85610 PROTHROMBIN TIME: CPT | Performed by: HOSPITALIST

## 2020-10-05 PROCEDURE — 94799 UNLISTED PULMONARY SVC/PX: CPT

## 2020-10-05 PROCEDURE — 80048 BASIC METABOLIC PNL TOTAL CA: CPT | Performed by: HOSPITALIST

## 2020-10-05 PROCEDURE — 85025 COMPLETE CBC W/AUTO DIFF WBC: CPT | Performed by: HOSPITALIST

## 2020-10-05 PROCEDURE — 82962 GLUCOSE BLOOD TEST: CPT

## 2020-10-05 RX ORDER — GUAIFENESIN 600 MG/1
600 TABLET, EXTENDED RELEASE ORAL EVERY 12 HOURS SCHEDULED
Status: DISCONTINUED | OUTPATIENT
Start: 2020-10-05 | End: 2020-10-14 | Stop reason: HOSPADM

## 2020-10-05 RX ADMIN — Medication 4 ML: at 20:35

## 2020-10-05 RX ADMIN — LOSARTAN POTASSIUM 25 MG: 25 TABLET, FILM COATED ORAL at 10:05

## 2020-10-05 RX ADMIN — GLIPIZIDE 2.5 MG: 5 TABLET ORAL at 10:04

## 2020-10-05 RX ADMIN — CEPHALEXIN 500 MG: 500 CAPSULE ORAL at 21:46

## 2020-10-05 RX ADMIN — PANTOPRAZOLE SODIUM 40 MG: 40 TABLET, DELAYED RELEASE ORAL at 10:04

## 2020-10-05 RX ADMIN — TOBRAMYCIN 300 MG: 1.2 INJECTION, POWDER, LYOPHILIZED, FOR SOLUTION INTRAVENOUS at 20:35

## 2020-10-05 RX ADMIN — IPRATROPIUM BROMIDE AND ALBUTEROL SULFATE 3 ML: 2.5; .5 SOLUTION RESPIRATORY (INHALATION) at 08:30

## 2020-10-05 RX ADMIN — CARVEDILOL 3.12 MG: 3.12 TABLET, FILM COATED ORAL at 10:05

## 2020-10-05 RX ADMIN — CEPHALEXIN 500 MG: 500 CAPSULE ORAL at 16:07

## 2020-10-05 RX ADMIN — MONTELUKAST SODIUM 10 MG: 10 TABLET, FILM COATED ORAL at 21:46

## 2020-10-05 RX ADMIN — FLUTICASONE PROPIONATE 1 SPRAY: 50 SPRAY, METERED NASAL at 23:18

## 2020-10-05 RX ADMIN — FLUTICASONE PROPIONATE 1 SPRAY: 50 SPRAY, METERED NASAL at 10:05

## 2020-10-05 RX ADMIN — IPRATROPIUM BROMIDE AND ALBUTEROL SULFATE 3 ML: 2.5; .5 SOLUTION RESPIRATORY (INHALATION) at 20:35

## 2020-10-05 RX ADMIN — Medication 4 ML: at 08:34

## 2020-10-05 RX ADMIN — IPRATROPIUM BROMIDE AND ALBUTEROL SULFATE 3 ML: 2.5; .5 SOLUTION RESPIRATORY (INHALATION) at 11:52

## 2020-10-05 RX ADMIN — HYDROCODONE BITARTRATE AND ACETAMINOPHEN 1 TABLET: 5; 325 TABLET ORAL at 03:17

## 2020-10-05 RX ADMIN — INSULIN LISPRO 5 UNITS: 100 INJECTION, SOLUTION INTRAVENOUS; SUBCUTANEOUS at 14:19

## 2020-10-05 RX ADMIN — HYDROCODONE BITARTRATE AND ACETAMINOPHEN 1 TABLET: 5; 325 TABLET ORAL at 16:07

## 2020-10-05 RX ADMIN — ASPIRIN 81 MG: 81 TABLET, COATED ORAL at 10:04

## 2020-10-05 RX ADMIN — ROSUVASTATIN CALCIUM 20 MG: 20 TABLET, FILM COATED ORAL at 21:46

## 2020-10-05 RX ADMIN — GUAIFENESIN 600 MG: 600 TABLET, EXTENDED RELEASE ORAL at 21:46

## 2020-10-05 RX ADMIN — INSULIN LISPRO 3 UNITS: 100 INJECTION, SOLUTION INTRAVENOUS; SUBCUTANEOUS at 17:51

## 2020-10-05 RX ADMIN — SODIUM CHLORIDE, PRESERVATIVE FREE 10 ML: 5 INJECTION INTRAVENOUS at 10:06

## 2020-10-05 RX ADMIN — HYDROCODONE BITARTRATE AND ACETAMINOPHEN 1 TABLET: 5; 325 TABLET ORAL at 10:04

## 2020-10-05 RX ADMIN — FUROSEMIDE 20 MG: 20 TABLET ORAL at 06:23

## 2020-10-05 RX ADMIN — CARVEDILOL 3.12 MG: 3.12 TABLET, FILM COATED ORAL at 21:46

## 2020-10-05 RX ADMIN — SODIUM CHLORIDE, PRESERVATIVE FREE 10 ML: 5 INJECTION INTRAVENOUS at 21:50

## 2020-10-05 RX ADMIN — CEPHALEXIN 500 MG: 500 CAPSULE ORAL at 10:05

## 2020-10-05 RX ADMIN — TOBRAMYCIN 300 MG: 1.2 INJECTION, POWDER, LYOPHILIZED, FOR SOLUTION INTRAVENOUS at 08:42

## 2020-10-05 RX ADMIN — HYDROCODONE BITARTRATE AND ACETAMINOPHEN 1 TABLET: 5; 325 TABLET ORAL at 21:48

## 2020-10-05 RX ADMIN — OXYBUTYNIN CHLORIDE 10 MG: 10 TABLET, EXTENDED RELEASE ORAL at 21:46

## 2020-10-05 RX ADMIN — IPRATROPIUM BROMIDE AND ALBUTEROL SULFATE 3 ML: 2.5; .5 SOLUTION RESPIRATORY (INHALATION) at 16:01

## 2020-10-05 RX ADMIN — OXAZEPAM 10 MG: 10 CAPSULE, GELATIN COATED ORAL at 21:46

## 2020-10-05 NOTE — PROGRESS NOTES
Pharmacy Consult: Warfarin Dosing/ Monitoring    Caitlyn Longoria is a 85 y.o. female, estimated creatinine clearance is 52.2 mL/min (by C-G formula based on SCr of 0.85 mg/dL). weighing 95.7 kg (211 lb).     has a past medical history of Aortic calcification (CMS/HCC), Aortic regurgitation, Asthma, Atrial fibrillation (CMS/HCC), CAD (coronary artery disease), Chronic combined systolic and diastolic congestive heart failure (CMS/HCC), CKD (chronic kidney disease) stage 3, GFR 30-59 ml/min, Coronary artery disease involving native coronary artery of native heart with angina pectoris (CMS/HCC), Disc degeneration, lumbar, DM type 2 (diabetes mellitus, type 2) (CMS/HCC), GERD (gastroesophageal reflux disease), History of aneurysm, History of blood transfusion, History of fracture, History of vitamin D deficiency, Hyperlipidemia, Hypertension, Mild mitral regurgitation, Mitral annular calcification, PAF (paroxysmal atrial fibrillation) (CMS/[facility]), Peripheral neuropathy, Skin cancer, Sleep apnea, SSS (sick sinus syndrome) (CMS/HCC), Stroke (cerebrum) (CMS/[facility]), and Tricuspid regurgitation.    Social History     Tobacco Use    Smoking status: Never Smoker    Smokeless tobacco: Never Used    Tobacco comment: caffeine use- soda   Substance Use Topics    Alcohol use: Not Currently    Drug use: No       Results from last 7 days   Lab Units 10/05/20  0513 10/04/20  0619 10/03/20  0830 10/02/20  0747 10/01/20  0710 09/30/20  0702 09/29/20  1755   INR  1.59* 1.70* 1.98* 2.31* 3.60* 3.78* 3.41*   HEMOGLOBIN g/dL 11.2* 11.2* 12.0 12.4 11.3*  --  12.7   HEMATOCRIT % 34.3 35.1 39.1 38.8 35.1  --  39.2   PLATELETS 10*3/mm3 150 136* 162 158 145  --  158     Results from last 7 days   Lab Units 10/05/20  0513 10/04/20  0619 10/03/20  0830 10/02/20  0747 10/01/20  0710  09/29/20  1755   SODIUM mmol/L 136 133* 140 139 140   < > 141   POTASSIUM mmol/L 3.5 3.8 4.1 3.7 3.9   < > 3.2*   CHLORIDE mmol/L 99 96* 100 101 103   < > 105   CO2 mmol/L  26.5 30.5* 29.6* 31.1* 27.4   < > 22.9   BUN mg/dL 18 20 24* 25* 18   < > 16   CREATININE mg/dL 0.85 0.80 0.90 0.87 0.90   < > 1.00   CALCIUM mg/dL 8.5* 8.3* 8.6 8.4* 8.5*   < > 9.1   BILIRUBIN mg/dL  --   --   --  0.5 0.4  --  0.8   ALK PHOS U/L  --   --   --  106 91  --  105   ALT (SGPT) U/L  --   --   --  25 19  --  23   AST (SGOT) U/L  --   --   --  22 16  --  17   GLUCOSE mg/dL 157* 158* 155* 148* 189*   < > 131*    < > = values in this interval not displayed.     Anticoagulation history: h/o atrial fibrillation managed on warfarin through [facility]. As of 9/23 regimen noted as: Warfarin 2mg Su/T/W/Th/Sa and Warfarin 4mg M/F     Hospital Anticoagulation:  Consulting provider: Dr. Yoo  Start date: 9/30  Indication: Atrial Fibrillation  Target INR: 2-3  Expected duration: Indefinite   Bridge Therapy: No                  Date 9/29 9/30  10/1 10/2  10/3  10/4  10/5          INR 3.41 3.78  3.6 2.31  1.98  1.70  1.59         Warfarin dose 0 0 0  2  mg  3mg  3mg  held per md             Potential drug interactions:    d/c'ed 10/2  Crestor (home med, Moderate): May enhance anticoagulant effect of warfarin  Pantoprazole (Moderate, home med): May enhance anticoagulant effect of warfarin.  Acetaminophen (prn) - may result in increased risk of bleeding     -- once dose doxy / ctx on 9/29      Relevant nutrition status: Reg diet, 75% meals      Other:      Education complete?/ Date: Not at this time     Assessment/Plan:  INR slightly subtherapeutic d/t held doses   Warfarin to be held tonight per Dr Coburn for bronch tomorrow     Warfarin to be held per Dr Coburn for bronch tomorrow  Monitor INR, DDI, s/s bleeding    Pharmacy will continue to follow until discharge or discontinuation of warfarin.     Catherine Yang RPH  10/5/2020

## 2020-10-05 NOTE — PROGRESS NOTES
PROGRESS NOTE  Patient Name: Caitlyn Longoria  Age/Sex: 85 y.o. female  : 1934  MRN: [id number]    Date of Admission: 2020  Date of Encounter Visit: 10/05/20   LOS: 6 days   Patient Care Team:  Zenaida Suarez MD as PCP - General (Internal Medicine)  Juno Coburn MD as PCP - Claims Attributed  Pao Levi [facility] as Pharmacist  Alexander Valiente [facility] as Pharmacist (Pharmacy)  Zenaida Suarez MD as Referring Physician (Internal Medicine)  Lucien Larkin MD as Consulting Physician (Hematology and Oncology)    Chief Complaint: Improvin worsening symptoms, difficulty expectorating    Hospital course: Patient had heavy growth of Pseudomonas on her sputum culture, had evidence of pneumonia on the CAT scan     Interval History: Afebrile,  patient did have some improvement initially however since last night, she has been having worsening symptoms, worsening cough, difficulty expectorating, with very thick mucus, despite the effort with the mucolytic therapy.  She is not able to wear the percussion vest because of the rib fracture and the chest pain..    REVIEW OF SYSTEMS:   CONSTITUTIONAL: no fever or chills  CARDIOVASCULAR: No angina type chest pain, positive chest pain from the rib fracture however, chest pressure or chest discomfort. No palpitations or edema.   RESPIRATORY: Worsening dyspnea, worsening cough, difficulty expectorating, very thick secretions   GASTROINTESTINAL: No anorexia, nausea, vomiting or diarrhea. No abdominal pain or blood.   HEMATOLOGIC: No bleeding or bruising.     Ventilator/Non-Invasive Ventilation Settings (From admission, onward)    None            Vital Signs  Temp:  [97.6 °F (36.4 °C)-98.1 °F (36.7 °C)] 97.6 °F (36.4 °C)  Heart Rate:  [79-83] 81  Resp:  [20-22] 21  BP: (107-139)/(71-91) 135/89  SpO2:  [91 %-99 %] 98 %  on  Flow (L/min):  [2] 2 Device (Oxygen Therapy): nasal cannula    Intake/Output Summary (Last 24 hours) at 10/5/2020 1406  Last data  "filed at 10/5/2020 0900  Gross per 24 hour   Intake 600 ml   Output 1000 ml   Net -400 ml     Flowsheet Rows      First Filed Value   Admission Height  157.5 cm (62\") Documented at 09/29/2020 2223   Admission Weight  95.7 kg (211 lb) Documented at 09/29/2020 2223        Body mass index is 38.59 kg/m².      09/29/20 2223   Weight: 95.7 kg (211 lb)       Physical Exam:  GEN:  No acute distress, alert, cooperative, well developed, on nasal cannula oxygen  EYES:   Sclerae clear. No icterus. PERRL. Normal EOM  ENT:   External ears/nose normal, no oral lesions, no thrush, mucous membranes moist  NECK:  Supple, midline trachea, no JVD  LUNGS: Normal chest on inspection, patient has very coarse rhonchi on coughing today, she is losing her voice quality as well, and she has more diminished breath sounds on exam as compared to yesterday, but there is no crackles, minimal labored breathing .   CV:  Regular rhythm and rate. Normal S1/S2. No murmurs, gallops, or rubs noted.  ABD:  Soft, nontender and nondistended. Normal bowel sounds. No guarding  EXT:  Moves all extremities well. No cyanosis. No redness.  Positive edema.   Skin: Dry, intact, no bleeding    Results Review:    Results From Last 14 Days   Lab Units 09/29/20  1755   D DIMER QUANT MCGFEU/mL 0.66*   LACTATE mmol/L 1.0     Results from last 7 days   Lab Units 10/05/20  0513 10/04/20  0619 10/03/20  0830 10/02/20  0747 10/01/20  0710 09/30/20  0702 09/29/20  1755   SODIUM mmol/L 136 133* 140 139 140 142 141   POTASSIUM mmol/L 3.5 3.8 4.1 3.7 3.9 4.1 3.2*   CHLORIDE mmol/L 99 96* 100 101 103 108* 105   CO2 mmol/L 26.5 30.5* 29.6* 31.1* 27.4 21.9* 22.9   BUN mg/dL 18 20 24* 25* 18 16 16   CREATININE mg/dL 0.85 0.80 0.90 0.87 0.90 0.84 1.00   CALCIUM mg/dL 8.5* 8.3* 8.6 8.4* 8.5* 8.3* 9.1   AST (SGOT) U/L  --   --   --  22 16  --  17   ALT (SGPT) U/L  --   --   --  25 19  --  23   ANION GAP mmol/L 10.5 6.5 10.4 6.9 9.6 12.1 13.1   ALBUMIN g/dL  --   --   --  3.80 3.50  " --  3.90     Results from last 7 days   Lab Units 09/29/20 2004 09/29/20  1755   TROPONIN T ng/mL <0.010 <0.010   D DIMER QUANT MCGFEU/mL  --  0.66*     Results from last 7 days   Lab Units 09/30/20  0702   TSH uIU/mL 0.801     Results from last 7 days   Lab Units 09/29/20  1755   PROBNP pg/mL 535.5     Results from last 7 days   Lab Units 10/05/20  0513 10/04/20  0619 10/03/20  0830 10/02/20  0747 10/01/20  0710 09/29/20  1755   WBC 10*3/mm3 15.53* 15.08* 19.64* 22.64* 23.36* 24.44*   HEMOGLOBIN g/dL 11.2* 11.2* 12.0 12.4 11.3* 12.7   HEMATOCRIT % 34.3 35.1 39.1 38.8 35.1 39.2   PLATELETS 10*3/mm3 150 136* 162 158 145 158   MCV fL 92.0 96.4 97.5* 94.4 94.1 92.9     Results from last 7 days   Lab Units 10/05/20  0513 10/04/20  0619 10/03/20  0830   INR  1.59* 1.70* 1.98*               Invalid input(s): LDLCALC          Glucose   Date/Time Value Ref Range Status   10/05/2020 1124 207 (H) 70 - 130 mg/dL Final   10/04/2020 2057 130 70 - 130 mg/dL Final   10/04/2020 1621 195 (H) 70 - 130 mg/dL Final   10/04/2020 1127 181 (H) 70 - 130 mg/dL Final   10/03/2020 1958 160 (H) 70 - 130 mg/dL Final   10/03/2020 1644 140 (H) 70 - 130 mg/dL Final   10/03/2020 1235 153 (H) 70 - 130 mg/dL Final   10/03/2020 0612 153 (H) 70 - 130 mg/dL Final     Results from last 7 days   Lab Units 10/02/20  0747 09/30/20  0702 09/29/20 2004 09/29/20  1755   PROCALCITONIN ng/mL 0.07 0.06 0.05  --    LACTATE mmol/L  --   --   --  1.0     Results from last 7 days   Lab Units 09/30/20  1114 09/29/20 2004 09/29/20  1755   BLOODCX   --  No growth at 5 days No growth at 5 days   RESPCX  Heavy growth (4+) Pseudomonas aeruginosa*  No Normal Respiratory Milana*  --   --          Results from last 7 days   Lab Units 10/01/20  1558   ADENOVIRUS DETECTION BY PCR  Not Detected   CORONAVIRUS 229E  Not Detected   CORONAVIRUS HKU1  Not Detected   CORONAVIRUS NL63  Not Detected   CORONAVIRUS OC43  Not Detected   HUMAN METAPNEUMOVIRUS  Not Detected   HUMAN  RHINOVIRUS/ENTEROVIRUS  Detected*   INFLUENZA B PCR  Not Detected   PARAINFLUENZA 1  Not Detected   PARAINFLUENZA VIRUS 2  Not Detected   PARAINFLUENZA VIRUS 3  Not Detected   PARAINFLUENZA VIRUS 4  Not Detected   BORDETELLA PERTUSSIS PCR  Not Detected   BORDETELLA PARAPERTUSSIS PCR  Not Detected   [id number]  Not Detected   CHLAMYDOPHILA PNEUMONIAE PCR  Not Detected   MYCOPLAMA PNEUMO PCR  Not Detected   INFLUENZA A H3  Not Detected   INFLUENZA A H1  Not Detected   RSV, PCR  Not Detected               Imaging:   Imaging Results (All)     Procedure Component Value Units Date/Time     I reviewed the patient's new clinical results.  I personally viewed and interpreted the patient's imaging results:        Medication Review:   aspirin, 81 mg, Oral, Daily  budesonide-formoterol, 2 puff, Inhalation, BID - RT  carvedilol, 3.125 mg, Oral, BID  cephalexin, 500 mg, Oral, TID  fluticasone, 1 spray, Nasal, BID  furosemide, 20 mg, Oral, QAM  glipizide, 2.5 mg, Oral, QAM  insulin lispro, 0-14 Units, Subcutaneous, TID AC  ipratropium-albuterol, 3 mL, Nebulization, 4x Daily - RT  losartan, 25 mg, Oral, Daily  montelukast, 10 mg, Oral, Nightly  oxazepam, 10 mg, Oral, Nightly  oxybutynin XL, 10 mg, Oral, Nightly  pantoprazole, 40 mg, Oral, Daily  rosuvastatin, 20 mg, Oral, Nightly  sodium chloride, 10 mL, Intravenous, Q12H  sodium chloride, 4 mL, Nebulization, BID  tobramycin, 300 mg, Nebulization, BID - RT  warfarin (COUMADIN) (dosing per levels), , Does not apply, Daily        Pharmacy to dose warfarin,         ASSESSMENT:   1. COPD/asthma with mild exacerbation  2. Right lower lobe pneumonia with heavy growth of Pseudomonas on cultures  3. Chronic bronchitis  4. Acute hypoxemic respiratory failure  5. Elevated left hemidiaphragm: Chronic  6. Ischemic cardiomyopathy  7. Diastolic dysfunction  8. Mitral regurgitation  9. Atrial fibrillation and sick sinus syndrome  10. CLL  11. 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 Hypertension  13. Diabetes  14. CHARLENE on BiPAP  15. Immunodeficiency    PLAN:  With her current patient is having difficulty with the secretions, and she sounds very rhonchorous, and she seems to be having problem expectorating despite the maximized medical therapy for that and with limitation from using the percussion vest because of her rib fracture  At this point I feel no other alternative than to go and with the bronchoscope and do washing and lavage of all her excessive secretion which usually provide prompt improvement in the respiratory symptoms and it will give us the option to do a more reliable cultures from her lung.  Discussed with the patient with the nursing staff, she is agreeable  Patient is on Coumadin trying to adjust the dose, her INR is still below 2.0.   that Coumadin will be held today, she is okay to proceed with the bronchoscopy tomorrow even if the INR is in the low therapeutic range since no biopsy is planned, all what were doing is bronchoscopy with washing and lavage.    Discussed with the patient and with the nursing staff and with the primary team      Disposition: [place]    Juno Coburn MD  10/05/20  14:06 EDT            Dictated utilizing Dragon dictation

## 2020-10-05 NOTE — PLAN OF CARE
Goal Outcome Evaluation:  Plan of Care Reviewed With: patient  Progress: improving  Outcome Summary: VSS, up in chair for a few hours, Norco Q 6 hours PRN, 2 L O2 at night, Tobramycin per RT, will continue to monitor

## 2020-10-05 NOTE — H&P
"DAILY PROGRESS NOTE  [facility]    Patient Identification:  Name: Caitlyn Longoria  Age: 85 y.o.  Sex: female  :  1934  MRN: [id number]         Primary Care Physician: Zenaida Suarez MD    Subjective:  Interval History:Short of air.    Objective:    Scheduled Meds:aspirin, 81 mg, Oral, Daily  budesonide-formoterol, 2 puff, Inhalation, BID - RT  carvedilol, 3.125 mg, Oral, BID  cephalexin, 500 mg, Oral, TID  fluticasone, 1 spray, Nasal, BID  furosemide, 20 mg, Oral, QAM  glipizide, 2.5 mg, Oral, QAM  insulin lispro, 0-14 Units, Subcutaneous, TID AC  ipratropium-albuterol, 3 mL, Nebulization, 4x Daily - RT  losartan, 25 mg, Oral, Daily  montelukast, 10 mg, Oral, Nightly  oxazepam, 10 mg, Oral, Nightly  oxybutynin XL, 10 mg, Oral, Nightly  pantoprazole, 40 mg, Oral, Daily  rosuvastatin, 20 mg, Oral, Nightly  sodium chloride, 10 mL, Intravenous, Q12H  sodium chloride, 4 mL, Nebulization, BID  tobramycin, 300 mg, Nebulization, BID - RT  warfarin (COUMADIN) (dosing per levels), , Does not apply, Daily      Continuous Infusions:Pharmacy to dose warfarin,         Vital signs in last 24 hours:  Temp:  [97.6 °F (36.4 °C)-98.1 °F (36.7 °C)] 97.6 °F (36.4 °C)  Heart Rate:  [79-83] 81  Resp:  [20-22] 21  BP: (107-139)/(71-91) 135/89    Intake/Output:    Intake/Output Summary (Last 24 hours) at 10/5/2020 1428  Last data filed at 10/5/2020 1300  Gross per 24 hour   Intake 960 ml   Output 1000 ml   Net -40 ml       Exam:  /89 (BP Location: Right arm, Patient Position: Sitting)   Pulse 81   Temp 97.6 °F (36.4 °C) (Oral)   Resp 21   Ht 157.5 cm (62\")   Wt 95.7 kg (211 lb)   SpO2 98%   BMI 38.59 kg/m²     General Appearance:    Alert, cooperative, no distress   Head:    Normocephalic, without obvious abnormality, atraumatic   Eyes:       Throat:   Lips, tongue, gums normal   Neck:   Supple, symmetrical, trachea midline, no JVD   Lungs:     Rhonchi and wheezes. bilaterally, " respirations unlabored   Chest Wall:    No tenderness or deformity    Heart:    Regular rate and rhythm, S1 and S2 normal, no murmur,no  Rub or gallop   Abdomen:     Soft, nontender, bowel sounds active, no masses, no organomegaly    Extremities:   Extremities normal, atraumatic, no cyanosis or edema   Pulses:      Skin:   Skin is warm and dry,  no rashes or palpable lesions   Neurologic:   no focal deficits noted      Lab Results (last 72 hours)     Procedure Component Value Units Date/Time    POC Glucose Once [[id number]]  (Abnormal) Collected: 09/30/20 1557    Specimen: Blood Updated: 09/30/20 1559     Glucose 358 mg/dL     POC Glucose Once [[id number]]  (Abnormal) Collected: 09/30/20 1158    Specimen: Blood Updated: 09/30/20 1205     Glucose 366 mg/dL     COVID PRE-OP / PRE-PROCEDURE SCREENING ORDER (NO ISOLATION) - Swab, Nasopharynx [[id number]] Collected: 09/29/20 1819    Specimen: Swab from Nasopharynx Updated: 09/30/20 1135    Narrative:      The following orders were created for panel order COVID PRE-OP / PRE-PROCEDURE SCREENING ORDER (NO ISOLATION) - Swab, Nasopharynx.  Procedure                               Abnormality         Status                     ---------                               -----------         ------                     COVID-19,BIOTAP, NP/OP S...[[id number]]                      Final result                 Please view results for these tests on the individual orders.    COVID-19,BIOTAP, NP/OP SWAB IN TRANSPORT MEDIA OR SALINE 24-36 HR TAT - Swab, Nasopharynx [[id number]] Collected: 09/29/20 1819    Specimen: Swab from Nasopharynx Updated: 09/30/20 1135     SARS-CoV-2 PCR Not Detected     Comment: Nucleic acid specific to SARS-CoV-2 (COVID-19) virus was not detected inthis sample by the TaqPath (TM) COVID-19 Combo Kit.SARS-CoV-2 (COVID-19) nucleic acid testing performed using [employer] Aptima (R) SARS-CoV-2 Assay or [employer] TaqPath   (TM)COVID-19 Combo Kit as indicated above  "under Emergency Use Authorization (EUA) from the FDA. Aptima (R) and TaqPath (TM) test performancecharacteristics verified by [facility] in accordance with the FDAs Guidance Document (Policy for Diagnostic   Tests for Coronavirus Disease-2019during the Public Health Emergency) issued on March 16, 2020. The laboratory is regulated under CLIA as qualified to perform high-complexity testingUnless otherwise noted, all testing was performed at [facility],   CLIA No. [id number], [place] State Licensee No. [id number]       Respiratory Culture - Sputum, Cough [[id number]] Collected: 09/30/20 1114    Specimen: Sputum from Cough Updated: 09/30/20 1123    Procalcitonin [[id number]]  (Normal) Collected: 09/30/20 0702    Specimen: Blood from Hand, Left Updated: 09/30/20 0810     Procalcitonin 0.06 ng/mL     Narrative:      As a Marker for Sepsis (Non-Neonates):   1. <0.5 ng/mL represents a low risk of severe sepsis and/or septic shock.  1. >2 ng/mL represents a high risk of severe sepsis and/or septic shock.    As a Marker for Lower Respiratory Tract Infections that require antibiotic therapy:  PCT on Admission     Antibiotic Therapy             6-12 Hrs later  > 0.5                Strongly Recommended            >0.25 - <0.5         Recommended  0.1 - 0.25           Discouraged                   Remeasure/reassess PCT  <0.1                 Strongly Discouraged          Remeasure/reassess PCT      As 28 day mortality risk marker: \"Change in Procalcitonin Result\" (> 80 % or <=80 %) if Day 0 (or Day 1) and Day 4 values are available. Refer to [email or web address]-pct-[email or web address]/   Change in PCT <=80 %   A decrease of PCT levels below or equal to 80 % defines a positive change in PCT test result representing a higher risk for 28-day all-cause mortality of patients diagnosed with severe sepsis or septic shock.  Change in PCT > 80 %   A decrease of PCT levels of more than 80 % defines a negative change in PCT result representing a " "lower risk for 28-day all-cause mortality of patients diagnosed with severe sepsis or septic shock.                Results may be falsely decreased if patient taking Biotin.     TSH [[id number]]  (Normal) Collected: 09/30/20 0702    Specimen: Blood from Hand, Left Updated: 09/30/20 0805     TSH 0.801 uIU/mL     Basic Metabolic Panel [[id number]]  (Abnormal) Collected: 09/30/20 0702    Specimen: Blood from Hand, Left Updated: 09/30/20 0803     Glucose 235 mg/dL      BUN 16 mg/dL      Creatinine 0.84 mg/dL      Sodium 142 mmol/L      Potassium 4.1 mmol/L      Chloride 108 mmol/L      CO2 21.9 mmol/L      Calcium 8.3 mg/dL      eGFR Non African Amer 64 mL/min/1.73      BUN/Creatinine Ratio 19.0     Anion Gap 12.1 mmol/L     Narrative:      GFR Normal >60  Chronic Kidney Disease <60  Kidney Failure <15      Protime-INR [[id number]]  (Abnormal) Collected: 09/30/20 0702    Specimen: Blood from Hand, Left Updated: 09/30/20 0741     Protime 35.9 Seconds      INR 3.78    Procalcitonin [[id number]]  (Normal) Collected: 09/29/20 2004    Specimen: Blood Updated: 09/30/20 0024     Procalcitonin 0.05 ng/mL     Narrative:      As a Marker for Sepsis (Non-Neonates):   1. <0.5 ng/mL represents a low risk of severe sepsis and/or septic shock.  1. >2 ng/mL represents a high risk of severe sepsis and/or septic shock.    As a Marker for Lower Respiratory Tract Infections that require antibiotic therapy:  PCT on Admission     Antibiotic Therapy             6-12 Hrs later  > 0.5                Strongly Recommended            >0.25 - <0.5         Recommended  0.1 - 0.25           Discouraged                   Remeasure/reassess PCT  <0.1                 Strongly Discouraged          Remeasure/reassess PCT      As 28 day mortality risk marker: \"Change in Procalcitonin Result\" (> 80 % or <=80 %) if Day 0 (or Day 1) and Day 4 values are available. Refer to http://[email or web address]-pct-[email or web address]/   Change in PCT <=80 %   A decrease of PCT levels " below or equal to 80 % defines a positive change in PCT test result representing a higher risk for 28-day all-cause mortality of patients diagnosed with severe sepsis or septic shock.  Change in PCT > 80 %   A decrease of PCT levels of more than 80 % defines a negative change in PCT result representing a lower risk for 28-day all-cause mortality of patients diagnosed with severe sepsis or septic shock.                Results may be falsely decreased if patient taking Biotin.     POC Glucose Once [[id number]]  (Normal) Collected: 09/30/20 0006    Specimen: Blood Updated: 09/30/20 0007     Glucose 118 mg/dL     Troponin [[id number]]  (Normal) Collected: 09/29/20 2004    Specimen: Blood Updated: 09/29/20 2041     Troponin T <0.010 ng/mL     Narrative:      Troponin T Reference Range:  <= 0.03 ng/mL-   Negative for AMI  >0.03 ng/mL-     Abnormal for myocardial necrosis.  Clinicians would have to utilize clinical acumen, EKG, Troponin and serial changes to determine if it is an Acute Myocardial Infarction or myocardial injury due to an underlying chronic condition.       Results may be falsely decreased if patient taking Biotin.      Blood Culture - Blood, Arm, Left [[id number]] Collected: 09/29/20 2004    Specimen: Blood from Arm, Left Updated: 09/29/20 2012    CBC & Differential [[id number]]  (Abnormal) Collected: 09/29/20 1755    Specimen: Blood Updated: 09/29/20 1928    Narrative:      The following orders were created for panel order CBC & Differential.  Procedure                               Abnormality         Status                     ---------                               -----------         ------                     CBC Auto Differential[[id number]]        Abnormal            Final result                 Please view results for these tests on the individual orders.    CBC Auto Differential [[id number]]  (Abnormal) Collected: 09/29/20 1755    Specimen: Blood Updated: 09/29/20 1928     WBC 24.44 10*3/mm3      RBC  4.22 10*6/mm3      Hemoglobin 12.7 g/dL      Hematocrit 39.2 %      MCV 92.9 fL      MCH 30.1 pg      MCHC 32.4 g/dL      RDW 12.7 %      RDW-SD 42.6 fl      MPV 10.0 fL      Platelets 158 10*3/mm3     Manual Differential [[id number]]  (Abnormal) Collected: 09/29/20 1755    Specimen: Blood Updated: 09/29/20 1928     Neutrophil % 28.0 %      Lymphocyte % 70.0 %      Monocyte % 1.0 %      Basophil % 1.0 %      Neutrophils Absolute 6.84 10*3/mm3      Lymphocytes Absolute 17.11 10*3/mm3      Monocytes Absolute 0.24 10*3/mm3      Basophils Absolute 0.24 10*3/mm3      RBC Morphology Normal     Smudge Cells Mod/2+     Platelet Morphology Normal    BNP [[id number]]  (Normal) Collected: 09/29/20 1755    Specimen: Blood Updated: 09/29/20 1847     proBNP 535.5 pg/mL     Narrative:      Among patients with dyspnea, NT-proBNP is highly sensitive for the detection of acute congestive heart failure. In addition NT-proBNP of <300 pg/ml effectively rules out acute congestive heart failure with 99% negative predictive value.    Results may be falsely decreased if patient taking Biotin.      Troponin [[id number]]  (Normal) Collected: 09/29/20 1755    Specimen: Blood Updated: 09/29/20 1847     Troponin T <0.010 ng/mL     Narrative:      Troponin T Reference Range:  <= 0.03 ng/mL-   Negative for AMI  >0.03 ng/mL-     Abnormal for myocardial necrosis.  Clinicians would have to utilize clinical acumen, EKG, Troponin and serial changes to determine if it is an Acute Myocardial Infarction or myocardial injury due to an underlying chronic condition.       Results may be falsely decreased if patient taking Biotin.      Comprehensive Metabolic Panel [[id number]]  (Abnormal) Collected: 09/29/20 1755    Specimen: Blood Updated: 09/29/20 1843     Glucose 131 mg/dL      BUN 16 mg/dL      Creatinine 1.00 mg/dL      Sodium 141 mmol/L      Potassium 3.2 mmol/L      Chloride 105 mmol/L      CO2 22.9 mmol/L      Calcium 9.1 mg/dL      Total Protein 6.4  g/dL      Albumin 3.90 g/dL      ALT (SGPT) 23 U/L      AST (SGOT) 17 U/L      Alkaline Phosphatase 105 U/L      Total Bilirubin 0.8 mg/dL      eGFR Non African Amer 53 mL/min/1.73      Globulin 2.5 gm/dL      A/G Ratio 1.6 g/dL      BUN/Creatinine Ratio 16.0     Anion Gap 13.1 mmol/L     Narrative:      GFR Normal >60  Chronic Kidney Disease <60  Kidney Failure <15      Lactic Acid, Plasma [[id number]]  (Normal) Collected: 09/29/20 1755    Specimen: Blood Updated: 09/29/20 1831     Lactate 1.0 mmol/L     D-dimer, Quantitative [[id number]]  (Abnormal) Collected: 09/29/20 1755    Specimen: Blood Updated: 09/29/20 1829     D-Dimer, Quantitative 0.66 MCGFEU/mL     Narrative:      The [employer]ago D-Dimer test used in conjunction with a clinical pretest probability (PTP) assessment model, has been approved by the FDA to rule out the presence of venous thromboembolism (VTE) in outpatients suspected of deep venous thrombosis (DVT) or pulmonary embolism (PE). The cut-off for negative predictive value is <0.50 MCGFEU/mL.    Protime-INR [[id number]]  (Abnormal) Collected: 09/29/20 1755    Specimen: Blood Updated: 09/29/20 1829     Protime 33.2 Seconds      INR 3.41    Blood Culture - Blood, Arm, Left [[id number]] Collected: 09/29/20 1755    Specimen: Blood from Arm, Left Updated: 09/29/20 1812    Blood Gas, Venous [[id number]]  (Abnormal) Collected: 09/29/20 1801    Specimen: Venous Blood Updated: 09/29/20 1804     Site RV     pH, Venous 7.431 pH Units      pCO2, Venous 34.2 mm Hg      pO2, Venous 40.6 mm Hg      HCO3, Venous 22.7 mmol/L      Base Excess, Venous -1.1 mmol/L      O2 Saturation Calculated 77.8 %      Barometric Pressure for Blood Gas 744.9 mmHg      Modality Room Air     Rate 26 Breaths/minute         Data Review:  Results from last 7 days   Lab Units 10/05/20  0513 10/04/20  0619 10/03/20  0830   SODIUM mmol/L 136 133* 140   POTASSIUM mmol/L 3.5 3.8 4.1   CHLORIDE mmol/L 99 96* 100   CO2 mmol/L 26.5 30.5* 29.6*   BUN  mg/dL 18 20 24*   CREATININE mg/dL 0.85 0.80 0.90   GLUCOSE mg/dL 157* 158* 155*   CALCIUM mg/dL 8.5* 8.3* 8.6     Results from last 7 days   Lab Units 10/05/20  0513 10/04/20  0619 10/03/20  0830   WBC 10*3/mm3 15.53* 15.08* 19.64*   HEMOGLOBIN g/dL 11.2* 11.2* 12.0   HEMATOCRIT % 34.3 35.1 39.1   PLATELETS 10*3/mm3 150 136* 162     Results from last 7 days   Lab Units 09/30/20  0702   TSH uIU/mL 0.801         Lab Results   Lab Value Date/Time    TROPONINT <0.010 09/29/2020 2004    TROPONINT <0.010 09/29/2020 1755    TROPONINT <0.010 08/29/2020 1036    TROPONINT <0.010 04/28/2020 1421    TROPONINT <0.010 04/23/2020 2143    TROPONINT 0.014 02/11/2020 1748    TROPONINT <0.010 09/16/2019 1207    TROPONINT 0.12 (C) 03/27/2014 0955    TROPONINT 0.14 (C) 03/27/2014 0355    TROPONINT 0.14 (C) 03/26/2014 1755    TROPONINT 0.15 (C) 03/26/2014 0349         Results from last 7 days   Lab Units 10/02/20  0747 10/01/20  0710 09/29/20  1755   ALK PHOS U/L 106 91 105   BILIRUBIN mg/dL 0.5 0.4 0.8   ALT (SGPT) U/L 25 19 23   AST (SGOT) U/L 22 16 17     Results from last 7 days   Lab Units 09/30/20  0702   TSH uIU/mL 0.801         Glucose   Date/Time Value Ref Range Status   10/05/2020 1124 207 (H) 70 - 130 mg/dL Final   10/04/2020 2057 130 70 - 130 mg/dL Final   10/04/2020 1621 195 (H) 70 - 130 mg/dL Final   10/04/2020 1127 181 (H) 70 - 130 mg/dL Final   10/03/2020 1958 160 (H) 70 - 130 mg/dL Final   10/03/2020 1644 140 (H) 70 - 130 mg/dL Final   10/03/2020 1235 153 (H) 70 - 130 mg/dL Final   10/03/2020 0612 153 (H) 70 - 130 mg/dL Final     Results from last 7 days   Lab Units 10/05/20  0513 10/04/20  0619 10/03/20  0830   INR  1.59* 1.70* 1.98*       Past Medical History:   Diagnosis Date   • Aortic calcification (CMS/HCC)     mild, on echo 12/17/2017   • Aortic regurgitation     Trace   • Asthma    • Atrial fibrillation (CMS/HCC)    • CAD (coronary artery disease)    • Chronic combined systolic and diastolic congestive heart  failure (CMS/[facility])    • CKD (chronic kidney disease) stage 3, GFR 30-59 ml/min    • Coronary artery disease involving native coronary artery of native heart with angina pectoris (CMS/[facility])    • Disc degeneration, lumbar    • DM type 2 (diabetes mellitus, type 2) (CMS/[facility])    • GERD (gastroesophageal reflux disease)    • History of aneurysm     right femoral artery s/p LHC   • History of blood transfusion    • History of fracture    • History of vitamin D deficiency    • Hyperlipidemia    • Hypertension    • Mild mitral regurgitation    • Mitral annular calcification     12/8/2017- echo, moderate   • PAF (paroxysmal atrial fibrillation) (CMS/[facility])    • Peripheral neuropathy    • Skin cancer     Left hand   • Sleep apnea    • SSS (sick sinus syndrome) (CMS/[facility])    • Stroke (cerebrum) (CMS/[facility])    • Tricuspid regurgitation     Trace       Assessment:  Active Hospital Problems    Diagnosis  POA   • **Chronic bronchitis (CMS/[facility]) [J42]  Yes   • Pneumonia due to gram-negative bacteria (CMS/[facility]) [J15.6]  Unknown   • Closed wedge compression fracture of T5 vertebra (CMS/[facility]) [S22.050A]  Unknown   • Dyspnea [R06.00]  Yes   • Anticoagulated on Coumadin [Z79.01]  Not Applicable   • CLL (chronic lymphoid leukemia) in relapse (CMS/[facility]) [C91.92]  Yes   • Rhinovirus [B34.8]  Yes   • Morbid obesity (CMS/[facility]) [E66.01]  Yes   • HTN (hypertension) [I10]  Yes   • CKD (chronic kidney disease), stage III [N18.30]  Yes   • DM II (diabetes mellitus, type II), controlled (CMS/[facility]) [E11.9]  Yes   • Paroxysmal atrial fibrillation (CMS/[facility]) [I48.0]  Yes      Resolved Hospital Problems   No resolved problems to display.       Plan:  Continue the nebs and O 2, await pulmonary. Follow lab.  Antibiotics for PNA. Bronch tomorrow.    Saman Cruz MD  10/5/2020  14:28 EDT

## 2020-10-06 ENCOUNTER — ANESTHESIA (OUTPATIENT)
Dept: GASTROENTEROLOGY | Facility: HOSPITAL | Age: 85
End: 2020-10-06

## 2020-10-06 ENCOUNTER — ANESTHESIA EVENT (OUTPATIENT)
Dept: GASTROENTEROLOGY | Facility: HOSPITAL | Age: 85
End: 2020-10-06

## 2020-10-06 LAB
ANION GAP SERPL CALCULATED.3IONS-SCNC: 9.2 MMOL/L (ref 5–15)
BUN SERPL-MCNC: 16 MG/DL (ref 8–23)
BUN/CREAT SERPL: 20.5 (ref 7–25)
CALCIUM SPEC-SCNC: 8.4 MG/DL (ref 8.6–10.5)
CHLORIDE SERPL-SCNC: 99 MMOL/L (ref 98–107)
CO2 SERPL-SCNC: 30.8 MMOL/L (ref 22–29)
CREAT SERPL-MCNC: 0.78 MG/DL (ref 0.57–1)
DEPRECATED RDW RBC AUTO: 40.9 FL (ref 37–54)
ERYTHROCYTE [DISTWIDTH] IN BLOOD BY AUTOMATED COUNT: 12.4 % (ref 12.3–15.4)
GFR SERPL CREATININE-BSD FRML MDRD: 70 ML/MIN/1.73
GLUCOSE BLDC GLUCOMTR-MCNC: 128 MG/DL (ref 70–130)
GLUCOSE BLDC GLUCOMTR-MCNC: 140 MG/DL (ref 70–130)
GLUCOSE BLDC GLUCOMTR-MCNC: 174 MG/DL (ref 70–130)
GLUCOSE BLDC GLUCOMTR-MCNC: 194 MG/DL (ref 70–130)
GLUCOSE SERPL-MCNC: 150 MG/DL (ref 65–99)
HCT VFR BLD AUTO: 36 % (ref 34–46.6)
HGB BLD-MCNC: 11.8 G/DL (ref 12–15.9)
INR PPP: 1.62 (ref 0.9–1.1)
LYMPHOCYTES # BLD MANUAL: 9.69 10*3/MM3 (ref 0.7–3.1)
LYMPHOCYTES NFR BLD MANUAL: 2.9 % (ref 5–12)
LYMPHOCYTES NFR BLD MANUAL: 60 % (ref 19.6–45.3)
MCH RBC QN AUTO: 30.1 PG (ref 26.6–33)
MCHC RBC AUTO-ENTMCNC: 32.8 G/DL (ref 31.5–35.7)
MCV RBC AUTO: 91.8 FL (ref 79–97)
MONOCYTES # BLD AUTO: 0.47 10*3/MM3 (ref 0.1–0.9)
NEUTROPHILS # BLD AUTO: 5.99 10*3/MM3 (ref 1.7–7)
NEUTROPHILS NFR BLD MANUAL: 37.1 % (ref 42.7–76)
PLAT MORPH BLD: NORMAL
PLATELET # BLD AUTO: 171 10*3/MM3 (ref 140–450)
PMV BLD AUTO: 9.7 FL (ref 6–12)
POTASSIUM SERPL-SCNC: 3.9 MMOL/L (ref 3.5–5.2)
PROTHROMBIN TIME: 18.9 SECONDS (ref 11.7–14.2)
RBC # BLD AUTO: 3.92 10*6/MM3 (ref 3.77–5.28)
RBC MORPH BLD: NORMAL
SMUDGE CELLS BLD QL SMEAR: ABNORMAL
SODIUM SERPL-SCNC: 139 MMOL/L (ref 136–145)
WBC # BLD AUTO: 16.15 10*3/MM3 (ref 3.4–10.8)

## 2020-10-06 PROCEDURE — 25010000002 ONDANSETRON PER 1 MG: Performed by: NURSE ANESTHETIST, CERTIFIED REGISTERED

## 2020-10-06 PROCEDURE — 25010000002 PROPOFOL 10 MG/ML EMULSION: Performed by: NURSE ANESTHETIST, CERTIFIED REGISTERED

## 2020-10-06 PROCEDURE — 87205 SMEAR GRAM STAIN: CPT | Performed by: INTERNAL MEDICINE

## 2020-10-06 PROCEDURE — 88312 SPECIAL STAINS GROUP 1: CPT | Performed by: INTERNAL MEDICINE

## 2020-10-06 PROCEDURE — 25010000002 CEFEPIME 2 G/NS 100 ML SOLUTION: Performed by: INTERNAL MEDICINE

## 2020-10-06 PROCEDURE — 82962 GLUCOSE BLOOD TEST: CPT

## 2020-10-06 PROCEDURE — 94799 UNLISTED PULMONARY SVC/PX: CPT

## 2020-10-06 PROCEDURE — 88112 CYTOPATH CELL ENHANCE TECH: CPT | Performed by: INTERNAL MEDICINE

## 2020-10-06 PROCEDURE — 88305 TISSUE EXAM BY PATHOLOGIST: CPT | Performed by: INTERNAL MEDICINE

## 2020-10-06 PROCEDURE — 0B9M8ZX DRAINAGE OF BILATERAL LUNGS, VIA NATURAL OR ARTIFICIAL OPENING ENDOSCOPIC, DIAGNOSTIC: ICD-10-PCS | Performed by: INTERNAL MEDICINE

## 2020-10-06 PROCEDURE — 87116 MYCOBACTERIA CULTURE: CPT | Performed by: INTERNAL MEDICINE

## 2020-10-06 PROCEDURE — 85025 COMPLETE CBC W/AUTO DIFF WBC: CPT | Performed by: HOSPITALIST

## 2020-10-06 PROCEDURE — 87206 SMEAR FLUORESCENT/ACID STAI: CPT | Performed by: INTERNAL MEDICINE

## 2020-10-06 PROCEDURE — 87070 CULTURE OTHR SPECIMN AEROBIC: CPT | Performed by: INTERNAL MEDICINE

## 2020-10-06 PROCEDURE — 85007 BL SMEAR W/DIFF WBC COUNT: CPT | Performed by: HOSPITALIST

## 2020-10-06 PROCEDURE — 63710000001 TOBRAMYCIN PER 300 MG: Performed by: INTERNAL MEDICINE

## 2020-10-06 PROCEDURE — 87077 CULTURE AEROBIC IDENTIFY: CPT | Performed by: INTERNAL MEDICINE

## 2020-10-06 PROCEDURE — 87102 FUNGUS ISOLATION CULTURE: CPT | Performed by: INTERNAL MEDICINE

## 2020-10-06 PROCEDURE — 85610 PROTHROMBIN TIME: CPT | Performed by: HOSPITALIST

## 2020-10-06 PROCEDURE — 80048 BASIC METABOLIC PNL TOTAL CA: CPT | Performed by: HOSPITALIST

## 2020-10-06 RX ORDER — LIDOCAINE HYDROCHLORIDE 20 MG/ML
INJECTION, SOLUTION INFILTRATION; PERINEURAL AS NEEDED
Status: DISCONTINUED | OUTPATIENT
Start: 2020-10-06 | End: 2020-10-06 | Stop reason: SURG

## 2020-10-06 RX ORDER — PROPOFOL 10 MG/ML
VIAL (ML) INTRAVENOUS AS NEEDED
Status: DISCONTINUED | OUTPATIENT
Start: 2020-10-06 | End: 2020-10-06 | Stop reason: SURG

## 2020-10-06 RX ORDER — WARFARIN SODIUM 3 MG/1
3 TABLET ORAL
Status: COMPLETED | OUTPATIENT
Start: 2020-10-06 | End: 2020-10-06

## 2020-10-06 RX ORDER — SODIUM CHLORIDE 9 MG/ML
1000 INJECTION, SOLUTION INTRAVENOUS CONTINUOUS
Status: ACTIVE | OUTPATIENT
Start: 2020-10-06 | End: 2020-10-08

## 2020-10-06 RX ORDER — PROPOFOL 10 MG/ML
VIAL (ML) INTRAVENOUS CONTINUOUS PRN
Status: DISCONTINUED | OUTPATIENT
Start: 2020-10-06 | End: 2020-10-06 | Stop reason: SURG

## 2020-10-06 RX ORDER — LIDOCAINE HYDROCHLORIDE 10 MG/ML
INJECTION, SOLUTION EPIDURAL; INFILTRATION; INTRACAUDAL; PERINEURAL AS NEEDED
Status: DISCONTINUED | OUTPATIENT
Start: 2020-10-06 | End: 2020-10-06 | Stop reason: HOSPADM

## 2020-10-06 RX ORDER — LIDOCAINE HYDROCHLORIDE 20 MG/ML
INJECTION, SOLUTION EPIDURAL; INFILTRATION; INTRACAUDAL; PERINEURAL AS NEEDED
Status: DISCONTINUED | OUTPATIENT
Start: 2020-10-06 | End: 2020-10-06 | Stop reason: HOSPADM

## 2020-10-06 RX ORDER — EPHEDRINE SULFATE 50 MG/ML
INJECTION, SOLUTION INTRAVENOUS AS NEEDED
Status: DISCONTINUED | OUTPATIENT
Start: 2020-10-06 | End: 2020-10-06 | Stop reason: SURG

## 2020-10-06 RX ORDER — ONDANSETRON 2 MG/ML
INJECTION INTRAMUSCULAR; INTRAVENOUS AS NEEDED
Status: DISCONTINUED | OUTPATIENT
Start: 2020-10-06 | End: 2020-10-06 | Stop reason: SURG

## 2020-10-06 RX ADMIN — IPRATROPIUM BROMIDE AND ALBUTEROL SULFATE 3 ML: 2.5; .5 SOLUTION RESPIRATORY (INHALATION) at 15:55

## 2020-10-06 RX ADMIN — CARVEDILOL 3.12 MG: 3.12 TABLET, FILM COATED ORAL at 08:45

## 2020-10-06 RX ADMIN — EPHEDRINE SULFATE 10 MG: 50 INJECTION INTRAVENOUS at 10:28

## 2020-10-06 RX ADMIN — HYDROCODONE BITARTRATE AND ACETAMINOPHEN 1 TABLET: 5; 325 TABLET ORAL at 06:55

## 2020-10-06 RX ADMIN — MONTELUKAST SODIUM 10 MG: 10 TABLET, FILM COATED ORAL at 21:50

## 2020-10-06 RX ADMIN — IPRATROPIUM BROMIDE AND ALBUTEROL SULFATE 3 ML: 2.5; .5 SOLUTION RESPIRATORY (INHALATION) at 08:42

## 2020-10-06 RX ADMIN — CEFEPIME 2 G: 2 INJECTION, POWDER, FOR SOLUTION INTRAVENOUS at 13:35

## 2020-10-06 RX ADMIN — GUAIFENESIN 600 MG: 600 TABLET, EXTENDED RELEASE ORAL at 21:50

## 2020-10-06 RX ADMIN — IPRATROPIUM BROMIDE AND ALBUTEROL SULFATE 3 ML: 2.5; .5 SOLUTION RESPIRATORY (INHALATION) at 11:40

## 2020-10-06 RX ADMIN — TOBRAMYCIN 300 MG: 1.2 INJECTION, POWDER, LYOPHILIZED, FOR SOLUTION INTRAVENOUS at 11:50

## 2020-10-06 RX ADMIN — LIDOCAINE HYDROCHLORIDE 60 MG: 20 INJECTION, SOLUTION INFILTRATION; PERINEURAL at 10:16

## 2020-10-06 RX ADMIN — EPHEDRINE SULFATE 10 MG: 50 INJECTION INTRAVENOUS at 10:30

## 2020-10-06 RX ADMIN — CARVEDILOL 3.12 MG: 3.12 TABLET, FILM COATED ORAL at 21:50

## 2020-10-06 RX ADMIN — SODIUM CHLORIDE 1000 ML: 9 INJECTION, SOLUTION INTRAVENOUS at 09:57

## 2020-10-06 RX ADMIN — OXYBUTYNIN CHLORIDE 10 MG: 10 TABLET, EXTENDED RELEASE ORAL at 21:50

## 2020-10-06 RX ADMIN — FLUTICASONE PROPIONATE 1 SPRAY: 50 SPRAY, METERED NASAL at 21:51

## 2020-10-06 RX ADMIN — WARFARIN 3 MG: 3 TABLET ORAL at 18:55

## 2020-10-06 RX ADMIN — ROSUVASTATIN CALCIUM 20 MG: 20 TABLET, FILM COATED ORAL at 21:50

## 2020-10-06 RX ADMIN — LOSARTAN POTASSIUM 25 MG: 25 TABLET, FILM COATED ORAL at 08:44

## 2020-10-06 RX ADMIN — TOBRAMYCIN 300 MG: 1.2 INJECTION, POWDER, LYOPHILIZED, FOR SOLUTION INTRAVENOUS at 20:23

## 2020-10-06 RX ADMIN — PROPOFOL 100 MG: 10 INJECTION, EMULSION INTRAVENOUS at 10:16

## 2020-10-06 RX ADMIN — CEPHALEXIN 500 MG: 500 CAPSULE ORAL at 08:44

## 2020-10-06 RX ADMIN — SODIUM CHLORIDE, PRESERVATIVE FREE 10 ML: 5 INJECTION INTRAVENOUS at 21:51

## 2020-10-06 RX ADMIN — SODIUM CHLORIDE, PRESERVATIVE FREE 10 ML: 5 INJECTION INTRAVENOUS at 09:00

## 2020-10-06 RX ADMIN — Medication 4 ML: at 08:32

## 2020-10-06 RX ADMIN — OXAZEPAM 10 MG: 10 CAPSULE, GELATIN COATED ORAL at 21:50

## 2020-10-06 RX ADMIN — ONDANSETRON HYDROCHLORIDE 4 MG: 2 SOLUTION INTRAMUSCULAR; INTRAVENOUS at 10:24

## 2020-10-06 RX ADMIN — IPRATROPIUM BROMIDE AND ALBUTEROL SULFATE 3 ML: 2.5; .5 SOLUTION RESPIRATORY (INHALATION) at 20:23

## 2020-10-06 RX ADMIN — Medication 4 ML: at 20:23

## 2020-10-06 RX ADMIN — PROPOFOL 75 MCG/KG/MIN: 10 INJECTION, EMULSION INTRAVENOUS at 10:17

## 2020-10-06 RX ADMIN — HYDROCODONE BITARTRATE AND ACETAMINOPHEN 1 TABLET: 5; 325 TABLET ORAL at 21:50

## 2020-10-06 NOTE — OP NOTE
Bronchoscopy Procedure Note    Procedure:  1. Bronchoscopy, Diagnostic  2. Bronchoscopy, Therapeutic    Pre-Operative Diagnosis: Mucopurulent bronchitis    Post-Operative Diagnosis: Same with evidence of left lower lobe pneumonia    Indication: Inability to clear secretion on her own despite maximum mucolytic regimen    Anesthesia: Monitored Anesthesia Care (MAC)    Procedure Details: Patient was consented for the procedure with all risks and benefits of the procedure explained in detail.  Patient was given the opportunity to ask questions and all concerns were answered.  The bronchocope was inserted into the main airway via the oropharynx. An anatomical survey was done of the main airways and the subsegmental bronchus.  The findings are reported above.  A bronchoalveolar lavage was performed using aliquots of normal saline instilled into the airways then aspirated back.    Findings:  Vocal cord was slightly swollen but were normal  Crowded space in the posterior oropharynx suggestive of possible sleep apnea  Purulent secretion brownish in color, filling up the whole bronchial tree with significant involvement of the left lung causing large mucous plugs  The secretion were all cleaned until no evidence of any more mucus retained behind in the airway all the way down to the subsegmental level.  Patient tolerated the procedure very well  Bleeding was very minimal  Evidence of airway edema more so on the left lower lobe consistent with the finding of pneumonia on the chest x-ray and CT scan      Estimated Blood Loss:  Minimal           Specimens:  Washings from both lung fields but mainly from the left lung                Complications:  None; patient tolerated the procedure well.           Disposition: PACU - hemodynamically stable.      Patient tolerated the procedure well.    While I was in the room and during my examination of the patient I were gown, gloves, mask, eye protection and washed my hands before and  after the encounter.  Proper enhanced droplet precautions precautions and isolation precautions were taken.    Juno Coburn MD  10/6/2020  10:28 EDT

## 2020-10-06 NOTE — ANESTHESIA POSTPROCEDURE EVALUATION
"Patient: Caitlyn Longoria    Procedure Summary     Date: 10/06/20 Room / Location:  [facility]    Anesthesia Start: 1005 Anesthesia Stop: 1036    Procedure: BRONCHOSCOPY with BILATERAL LUNG washings (Bilateral Bronchus) Diagnosis:       Pneumonia due to gram-negative bacteria (CMS/HCC)      (Pneumonia due to gram-negative bacteria (CMS/HCC) [J15.6])    Surgeon: Juno Coburn MD Provider: Jonah Huynh MD    Anesthesia Type: general ASA Status: 4          Anesthesia Type: general    Vitals  Vitals Value Taken Time   /65 10/06/20 1055   Temp     Pulse 81 10/06/20 1055   Resp 16 10/06/20 1055   SpO2 99 % 10/06/20 1055           Post Anesthesia Care and Evaluation    Patient location during evaluation: bedside  Patient participation: complete - patient participated  Level of consciousness: awake and alert  Pain management: adequate  Airway patency: patent  Anesthetic complications: No anesthetic complications    Cardiovascular status: acceptable  Respiratory status: acceptable  Hydration status: acceptable    Comments: /58 (BP Location: Right arm, Patient Position: Sitting)   Pulse 80   Temp 36.7 °C (98.1 °F) (Oral)   Resp 16   Ht 157.5 cm (62\")   Wt 95.7 kg (211 lb)   SpO2 97%   BMI 38.59 kg/m²       "

## 2020-10-06 NOTE — PROGRESS NOTES
Patient is n.p.o.  Her Coumadin was held yesterday  Her INR is less than 2  She is supposed to have her bronchoscopy today  Still complaining of the very thick secretion  Exam showed very coarse rhonchi  Plan to proceed with the bronchoscopy as planned please see bronchoscopy note for more details

## 2020-10-06 NOTE — ANESTHESIA PREPROCEDURE EVALUATION
Anesthesia Evaluation     Patient summary reviewed and Nursing notes reviewed   NPO Solid Status: > 8 hours  NPO Liquid Status: > 8 hours           Airway   Mallampati: II  TM distance: >3 FB  Neck ROM: full  no difficulty expected  Dental - normal exam     Pulmonary - normal exam   (+) pneumonia , asthma,shortness of breath, sleep apnea,   Cardiovascular - normal exam    (+) hypertension, CAD, dysrhythmias Atrial Fib, Paroxysmal Atrial Fib, angina, CHF , hyperlipidemia,       Neuro/Psych  (+) CVA, numbness,     GI/Hepatic/Renal/Endo    (+) obesity, morbid obesity, GERD,  renal disease CRI, diabetes mellitus type 2,     Musculoskeletal     (+) neck pain,   Abdominal  - normal exam   Substance History      OB/GYN          Other   arthritis,                      Anesthesia Plan    ASA 4     general     intravenous induction     Anesthetic plan, all risks, benefits, and alternatives have been provided, discussed and informed consent has been obtained with: patient.

## 2020-10-06 NOTE — PLAN OF CARE
Goal Outcome Evaluation:  Plan of Care Reviewed With: patient  Progress: improving  Outcome Summary: bronchoscopy completed; IV abx started; tolerating regular soft diet; no c/o pain; productive cough

## 2020-10-06 NOTE — H&P
"DAILY PROGRESS NOTE  [facility]    Patient Identification:  Name: Caitlyn Longoria  Age: 85 y.o.  Sex: female  :  1934  MRN: [id number]         Primary Care Physician: Zenaida Suarez MD    Subjective:  Interval History:Short of air.  Back pain    Objective:    Scheduled Meds:aspirin, 81 mg, Oral, Daily  budesonide-formoterol, 2 puff, Inhalation, BID - RT  carvedilol, 3.125 mg, Oral, BID  cefepime, 2 g, Intravenous, Q12H  fluticasone, 1 spray, Nasal, BID  furosemide, 20 mg, Oral, QAM  glipizide, 2.5 mg, Oral, QAM  guaiFENesin, 600 mg, Oral, Q12H  insulin lispro, 0-14 Units, Subcutaneous, TID AC  ipratropium-albuterol, 3 mL, Nebulization, 4x Daily - RT  losartan, 25 mg, Oral, Daily  montelukast, 10 mg, Oral, Nightly  oxazepam, 10 mg, Oral, Nightly  oxybutynin XL, 10 mg, Oral, Nightly  pantoprazole, 40 mg, Oral, Daily  rosuvastatin, 20 mg, Oral, Nightly  sodium chloride, 10 mL, Intravenous, Q12H  sodium chloride, 4 mL, Nebulization, BID  tobramycin, 300 mg, Nebulization, BID - RT  warfarin (COUMADIN) (dosing per levels), , Does not apply, Daily      Continuous Infusions:Pharmacy to dose warfarin,   sodium chloride, 1,000 mL, Last Rate: 1,000 mL (10/06/20 0957)        Vital signs in last 24 hours:  Temp:  [97.5 °F (36.4 °C)-98.1 °F (36.7 °C)] 98.1 °F (36.7 °C)  Heart Rate:  [] 80  Resp:  [16-24] 22  BP: ()/(52-93) 102/58    Intake/Output:    Intake/Output Summary (Last 24 hours) at 10/6/2020 1421  Last data filed at 10/6/2020 1031  Gross per 24 hour   Intake 870 ml   Output 600 ml   Net 270 ml       Exam:  /58 (BP Location: Right arm, Patient Position: Sitting)   Pulse 80   Temp 98.1 °F (36.7 °C) (Oral)   Resp 22   Ht 157.5 cm (62\")   Wt 95.7 kg (211 lb)   SpO2 95%   BMI 38.59 kg/m²     General Appearance:    Alert, cooperative, no distress   Head:    Normocephalic, without obvious abnormality, atraumatic   Eyes:       Throat:   Lips, tongue, gums normal   "   Neck:   Supple, symmetrical, trachea midline, no JVD   Lungs:     Rhonchi and wheezes. bilaterally, respirations unlabored   Chest Wall:    No tenderness or deformity    Heart:    Regular rate and rhythm, S1 and S2 normal, no murmur,no  Rub or gallop   Abdomen:     Soft, nontender, bowel sounds active, no masses, no organomegaly    Extremities:   Extremities normal, atraumatic, no cyanosis or edema   Pulses:      Skin:   Skin is warm and dry,  no rashes or palpable lesions   Neurologic:   no focal deficits noted      Lab Results (last 72 hours)     Procedure Component Value Units Date/Time    POC Glucose Once [[id number]]  (Abnormal) Collected: 09/30/20 1557    Specimen: Blood Updated: 09/30/20 1559     Glucose 358 mg/dL     POC Glucose Once [[id number]]  (Abnormal) Collected: 09/30/20 1158    Specimen: Blood Updated: 09/30/20 1205     Glucose 366 mg/dL     COVID PRE-OP / PRE-PROCEDURE SCREENING ORDER (NO ISOLATION) - Swab, Nasopharynx [[id number]] Collected: 09/29/20 1819    Specimen: Swab from Nasopharynx Updated: 09/30/20 1135    Narrative:      The following orders were created for panel order COVID PRE-OP / PRE-PROCEDURE SCREENING ORDER (NO ISOLATION) - Swab, Nasopharynx.  Procedure                               Abnormality         Status                     ---------                               -----------         ------                     COVID-19,BIOTAP, NP/OP S...[[id number]]                      Final result                 Please view results for these tests on the individual orders.    COVID-19,BIOTAP, NP/OP SWAB IN TRANSPORT MEDIA OR SALINE 24-36 HR TAT - Swab, Nasopharynx [[id number]] Collected: 09/29/20 1819    Specimen: Swab from Nasopharynx Updated: 09/30/20 1135     SARS-CoV-2 PCR Not Detected     Comment: Nucleic acid specific to SARS-CoV-2 (COVID-19) virus was not detected inthis sample by the TaqPath (TM) COVID-19 Combo Kit.SARS-CoV-2 (COVID-19) nucleic acid testing performed using [employer]  "Aptima (R) SARS-CoV-2 Assay or [employer] TaqPath   (TM)COVID-19 Combo Kit as indicated above under Emergency Use Authorization (EUA) from the FDA. Aptima (R) and TaqPath (TM) test performancecharacteristics verified by [facility] in accordance with the FDAs Guidance Document (Policy for Diagnostic   Tests for Coronavirus Disease-2019during the Public Health Emergency) issued on March 16, 2020. The laboratory is regulated under CLIA as qualified to perform high-complexity testingUnless otherwise noted, all testing was performed at [facility],   CLIA No. [id number], [place] State Licensee No. [id number]       Respiratory Culture - Sputum, Cough [[id number]] Collected: 09/30/20 1114    Specimen: Sputum from Cough Updated: 09/30/20 1123    Procalcitonin [[id number]]  (Normal) Collected: 09/30/20 0702    Specimen: Blood from Hand, Left Updated: 09/30/20 0810     Procalcitonin 0.06 ng/mL     Narrative:      As a Marker for Sepsis (Non-Neonates):   1. <0.5 ng/mL represents a low risk of severe sepsis and/or septic shock.  1. >2 ng/mL represents a high risk of severe sepsis and/or septic shock.    As a Marker for Lower Respiratory Tract Infections that require antibiotic therapy:  PCT on Admission     Antibiotic Therapy             6-12 Hrs later  > 0.5                Strongly Recommended            >0.25 - <0.5         Recommended  0.1 - 0.25           Discouraged                   Remeasure/reassess PCT  <0.1                 Strongly Discouraged          Remeasure/reassess PCT      As 28 day mortality risk marker: \"Change in Procalcitonin Result\" (> 80 % or <=80 %) if Day 0 (or Day 1) and Day 4 values are available. Refer to [email or web address]-pct-[email or web address]/   Change in PCT <=80 %   A decrease of PCT levels below or equal to 80 % defines a positive change in PCT test result representing a higher risk for 28-day all-cause mortality of patients diagnosed with severe sepsis or septic shock.  Change in PCT > 80 " "%   A decrease of PCT levels of more than 80 % defines a negative change in PCT result representing a lower risk for 28-day all-cause mortality of patients diagnosed with severe sepsis or septic shock.                Results may be falsely decreased if patient taking Biotin.     TSH [[id number]]  (Normal) Collected: 09/30/20 0702    Specimen: Blood from Hand, Left Updated: 09/30/20 0805     TSH 0.801 uIU/mL     Basic Metabolic Panel [[id number]]  (Abnormal) Collected: 09/30/20 0702    Specimen: Blood from Hand, Left Updated: 09/30/20 0803     Glucose 235 mg/dL      BUN 16 mg/dL      Creatinine 0.84 mg/dL      Sodium 142 mmol/L      Potassium 4.1 mmol/L      Chloride 108 mmol/L      CO2 21.9 mmol/L      Calcium 8.3 mg/dL      eGFR Non African Amer 64 mL/min/1.73      BUN/Creatinine Ratio 19.0     Anion Gap 12.1 mmol/L     Narrative:      GFR Normal >60  Chronic Kidney Disease <60  Kidney Failure <15      Protime-INR [[id number]]  (Abnormal) Collected: 09/30/20 0702    Specimen: Blood from Hand, Left Updated: 09/30/20 0741     Protime 35.9 Seconds      INR 3.78    Procalcitonin [[id number]]  (Normal) Collected: 09/29/20 2004    Specimen: Blood Updated: 09/30/20 0024     Procalcitonin 0.05 ng/mL     Narrative:      As a Marker for Sepsis (Non-Neonates):   1. <0.5 ng/mL represents a low risk of severe sepsis and/or septic shock.  1. >2 ng/mL represents a high risk of severe sepsis and/or septic shock.    As a Marker for Lower Respiratory Tract Infections that require antibiotic therapy:  PCT on Admission     Antibiotic Therapy             6-12 Hrs later  > 0.5                Strongly Recommended            >0.25 - <0.5         Recommended  0.1 - 0.25           Discouraged                   Remeasure/reassess PCT  <0.1                 Strongly Discouraged          Remeasure/reassess PCT      As 28 day mortality risk marker: \"Change in Procalcitonin Result\" (> 80 % or <=80 %) if Day 0 (or Day 1) and Day 4 values are " available. Refer to http://[email or web address]-pct-[email or web address]/   Change in PCT <=80 %   A decrease of PCT levels below or equal to 80 % defines a positive change in PCT test result representing a higher risk for 28-day all-cause mortality of patients diagnosed with severe sepsis or septic shock.  Change in PCT > 80 %   A decrease of PCT levels of more than 80 % defines a negative change in PCT result representing a lower risk for 28-day all-cause mortality of patients diagnosed with severe sepsis or septic shock.                Results may be falsely decreased if patient taking Biotin.     POC Glucose Once [[id number]]  (Normal) Collected: 09/30/20 0006    Specimen: Blood Updated: 09/30/20 0007     Glucose 118 mg/dL     Troponin [[id number]]  (Normal) Collected: 09/29/20 2004    Specimen: Blood Updated: 09/29/20 2041     Troponin T <0.010 ng/mL     Narrative:      Troponin T Reference Range:  <= 0.03 ng/mL-   Negative for AMI  >0.03 ng/mL-     Abnormal for myocardial necrosis.  Clinicians would have to utilize clinical acumen, EKG, Troponin and serial changes to determine if it is an Acute Myocardial Infarction or myocardial injury due to an underlying chronic condition.       Results may be falsely decreased if patient taking Biotin.      Blood Culture - Blood, Arm, Left [[id number]] Collected: 09/29/20 2004    Specimen: Blood from Arm, Left Updated: 09/29/20 2012    CBC & Differential [[id number]]  (Abnormal) Collected: 09/29/20 1755    Specimen: Blood Updated: 09/29/20 1928    Narrative:      The following orders were created for panel order CBC & Differential.  Procedure                               Abnormality         Status                     ---------                               -----------         ------                     CBC Auto Differential[[id number]]        Abnormal            Final result                 Please view results for these tests on the individual orders.    CBC Auto Differential [[id number]]   (Abnormal) Collected: 09/29/20 1755    Specimen: Blood Updated: 09/29/20 1928     WBC 24.44 10*3/mm3      RBC 4.22 10*6/mm3      Hemoglobin 12.7 g/dL      Hematocrit 39.2 %      MCV 92.9 fL      MCH 30.1 pg      MCHC 32.4 g/dL      RDW 12.7 %      RDW-SD 42.6 fl      MPV 10.0 fL      Platelets 158 10*3/mm3     Manual Differential [[id number]]  (Abnormal) Collected: 09/29/20 1755    Specimen: Blood Updated: 09/29/20 1928     Neutrophil % 28.0 %      Lymphocyte % 70.0 %      Monocyte % 1.0 %      Basophil % 1.0 %      Neutrophils Absolute 6.84 10*3/mm3      Lymphocytes Absolute 17.11 10*3/mm3      Monocytes Absolute 0.24 10*3/mm3      Basophils Absolute 0.24 10*3/mm3      RBC Morphology Normal     Smudge Cells Mod/2+     Platelet Morphology Normal    BNP [[id number]]  (Normal) Collected: 09/29/20 1755    Specimen: Blood Updated: 09/29/20 1847     proBNP 535.5 pg/mL     Narrative:      Among patients with dyspnea, NT-proBNP is highly sensitive for the detection of acute congestive heart failure. In addition NT-proBNP of <300 pg/ml effectively rules out acute congestive heart failure with 99% negative predictive value.    Results may be falsely decreased if patient taking Biotin.      Troponin [[id number]]  (Normal) Collected: 09/29/20 1755    Specimen: Blood Updated: 09/29/20 1847     Troponin T <0.010 ng/mL     Narrative:      Troponin T Reference Range:  <= 0.03 ng/mL-   Negative for AMI  >0.03 ng/mL-     Abnormal for myocardial necrosis.  Clinicians would have to utilize clinical acumen, EKG, Troponin and serial changes to determine if it is an Acute Myocardial Infarction or myocardial injury due to an underlying chronic condition.       Results may be falsely decreased if patient taking Biotin.      Comprehensive Metabolic Panel [[id number]]  (Abnormal) Collected: 09/29/20 1755    Specimen: Blood Updated: 09/29/20 1843     Glucose 131 mg/dL      BUN 16 mg/dL      Creatinine 1.00 mg/dL      Sodium 141 mmol/L       Potassium 3.2 mmol/L      Chloride 105 mmol/L      CO2 22.9 mmol/L      Calcium 9.1 mg/dL      Total Protein 6.4 g/dL      Albumin 3.90 g/dL      ALT (SGPT) 23 U/L      AST (SGOT) 17 U/L      Alkaline Phosphatase 105 U/L      Total Bilirubin 0.8 mg/dL      eGFR Non African Amer 53 mL/min/1.73      Globulin 2.5 gm/dL      A/G Ratio 1.6 g/dL      BUN/Creatinine Ratio 16.0     Anion Gap 13.1 mmol/L     Narrative:      GFR Normal >60  Chronic Kidney Disease <60  Kidney Failure <15      Lactic Acid, Plasma [[id number]]  (Normal) Collected: 09/29/20 1755    Specimen: Blood Updated: 09/29/20 1831     Lactate 1.0 mmol/L     D-dimer, Quantitative [[id number]]  (Abnormal) Collected: 09/29/20 1755    Specimen: Blood Updated: 09/29/20 1829     D-Dimer, Quantitative 0.66 MCGFEU/mL     Narrative:      The [employer] D-Dimer test used in conjunction with a clinical pretest probability (PTP) assessment model, has been approved by the FDA to rule out the presence of venous thromboembolism (VTE) in outpatients suspected of deep venous thrombosis (DVT) or pulmonary embolism (PE). The cut-off for negative predictive value is <0.50 MCGFEU/mL.    Protime-INR [[id number]]  (Abnormal) Collected: 09/29/20 1755    Specimen: Blood Updated: 09/29/20 1829     Protime 33.2 Seconds      INR 3.41    Blood Culture - Blood, Arm, Left [[id number]] Collected: 09/29/20 1755    Specimen: Blood from Arm, Left Updated: 09/29/20 1812    Blood Gas, Venous [[id number]]  (Abnormal) Collected: 09/29/20 1801    Specimen: Venous Blood Updated: 09/29/20 1804     Site RV     pH, Venous 7.431 pH Units      pCO2, Venous 34.2 mm Hg      pO2, Venous 40.6 mm Hg      HCO3, Venous 22.7 mmol/L      Base Excess, Venous -1.1 mmol/L      O2 Saturation Calculated 77.8 %      Barometric Pressure for Blood Gas 744.9 mmHg      Modality Room Air     Rate 26 Breaths/minute         Data Review:  Results from last 7 days   Lab Units 10/06/20  0710 10/05/20  0513 10/04/20  0619   SODIUM mmol/L  139 136 133*   POTASSIUM mmol/L 3.9 3.5 3.8   CHLORIDE mmol/L 99 99 96*   CO2 mmol/L 30.8* 26.5 30.5*   BUN mg/dL 16 18 20   CREATININE mg/dL 0.78 0.85 0.80   GLUCOSE mg/dL 150* 157* 158*   CALCIUM mg/dL 8.4* 8.5* 8.3*     Results from last 7 days   Lab Units 10/06/20  0710 10/05/20  0513 10/04/20  0619   WBC 10*3/mm3 16.15* 15.53* 15.08*   HEMOGLOBIN g/dL 11.8* 11.2* 11.2*   HEMATOCRIT % 36.0 34.3 35.1   PLATELETS 10*3/mm3 171 150 136*     Results from last 7 days   Lab Units 09/30/20  0702   TSH uIU/mL 0.801         Lab Results   Lab Value Date/Time    TROPONINT <0.010 09/29/2020 2004    TROPONINT <0.010 09/29/2020 1755    TROPONINT <0.010 08/29/2020 1036    TROPONINT <0.010 04/28/2020 1421    TROPONINT <0.010 04/23/2020 2143    TROPONINT 0.014 02/11/2020 1748    TROPONINT <0.010 09/16/2019 1207    TROPONINT 0.12 (C) 03/27/2014 0955    TROPONINT 0.14 (C) 03/27/2014 0355    TROPONINT 0.14 (C) 03/26/2014 1755    TROPONINT 0.15 (C) 03/26/2014 0349         Results from last 7 days   Lab Units 10/02/20  0747 10/01/20  0710 09/29/20  1755   ALK PHOS U/L 106 91 105   BILIRUBIN mg/dL 0.5 0.4 0.8   ALT (SGPT) U/L 25 19 23   AST (SGOT) U/L 22 16 17     Results from last 7 days   Lab Units 09/30/20  0702   TSH uIU/mL 0.801         Glucose   Date/Time Value Ref Range Status   10/06/2020 1113 174 (H) 70 - 130 mg/dL Final   10/06/2020 0625 128 70 - 130 mg/dL Final   10/05/2020 2111 172 (H) 70 - 130 mg/dL Final   10/05/2020 1621 164 (H) 70 - 130 mg/dL Final   10/05/2020 1124 207 (H) 70 - 130 mg/dL Final   10/04/2020 2057 130 70 - 130 mg/dL Final   10/04/2020 1621 195 (H) 70 - 130 mg/dL Final   10/04/2020 1127 181 (H) 70 - 130 mg/dL Final     Results from last 7 days   Lab Units 10/06/20  0710 10/05/20  0513 10/04/20  0619   INR  1.62* 1.59* 1.70*       Past Medical History:   Diagnosis Date   • Aortic calcification (CMS/HCC)     mild, on echo 12/17/2017   • Aortic regurgitation     Trace   • Asthma    • Atrial fibrillation  (CMS/[facility])    • CAD (coronary artery disease)    • Chronic combined systolic and diastolic congestive heart failure (CMS/[facility])    • CKD (chronic kidney disease) stage 3, GFR 30-59 ml/min    • Coronary artery disease involving native coronary artery of native heart with angina pectoris (CMS/[facility])    • Disc degeneration, lumbar    • DM type 2 (diabetes mellitus, type 2) (CMS/[facility])    • GERD (gastroesophageal reflux disease)    • History of aneurysm     right femoral artery s/p LHC   • History of blood transfusion    • History of fracture    • History of vitamin D deficiency    • Hyperlipidemia    • Hypertension    • Mild mitral regurgitation    • Mitral annular calcification     12/8/2017- echo, moderate   • PAF (paroxysmal atrial fibrillation) (CMS/[facility])    • Peripheral neuropathy    • Skin cancer     Left hand   • Sleep apnea    • SSS (sick sinus syndrome) (CMS/[facility])    • Stroke (cerebrum) (CMS/[facility])    • Tricuspid regurgitation     Trace       Assessment:  Active Hospital Problems    Diagnosis  POA   • **Chronic bronchitis (CMS/[facility]) [J42]  Yes   • Pneumonia due to gram-negative bacteria (CMS/[facility]) [J15.6]  Unknown   • Closed wedge compression fracture of T5 vertebra (CMS/[facility]) [S22.050A]  Unknown   • Dyspnea [R06.00]  Yes   • Anticoagulated on Coumadin [Z79.01]  Not Applicable   • CLL (chronic lymphoid leukemia) in relapse (CMS/[facility]) [C91.92]  Yes   • Rhinovirus [B34.8]  Yes   • Morbid obesity (CMS/[facility]) [E66.01]  Yes   • HTN (hypertension) [I10]  Yes   • CKD (chronic kidney disease), stage III [N18.30]  Yes   • DM II (diabetes mellitus, type II), controlled (CMS/[facility]) [E11.9]  Yes   • Paroxysmal atrial fibrillation (CMS/[facility]) [I48.0]  Yes      Resolved Hospital Problems   No resolved problems to display.       Plan:  Continue the nebs and O 2, await pulmonary. Follow lab.  Antibiotics for PNA. Bronch report noted.  Jarod brakeiry.    Saman Cruz MD  10/6/2020  14:21 EDT

## 2020-10-06 NOTE — ANESTHESIA PROCEDURE NOTES
Airway  Urgency: elective    Date/Time: 10/6/2020 10:17 AM  Airway not difficult    General Information and Staff    Patient location during procedure: OR  Anesthesiologist: Jonah Huynh MD  CRNA: Raine Sung CRNA    Indications and Patient Condition  Indications for airway management: airway protection    Preoxygenated: yes  MILS maintained throughout  Mask difficulty assessment: 1 - vent by mask    Final Airway Details  Final airway type: supraglottic airway      Successful airway: classic and bronch  Size 4    Number of attempts at approach: 1  Assessment: lips, teeth, and gum same as pre-op and atraumatic intubation

## 2020-10-06 NOTE — PLAN OF CARE
Problem: Adult Inpatient Plan of Care  Goal: Plan of Care Review  Outcome: Ongoing, Progressing  Flowsheets (Taken 10/6/2020 1258)  Plan of Care Reviewed With: patient  Outcome Summary:   bronchoscopy completed   IV abx started   tolerating regular soft diet   no c/o pain   productive cough  Goal: Patient-Specific Goal (Individualized)  Outcome: Ongoing, Progressing  Goal: Absence of Hospital-Acquired Illness or Injury  Outcome: Ongoing, Progressing  Intervention: Identify and Manage Fall Risk  Recent Flowsheet Documentation  Taken 10/6/2020 1200 by Barbara Sung RN  Safety Promotion/Fall Prevention:   activity supervised   safety round/check completed  Taken 10/6/2020 1000 by Barbara Sung RN  Safety Promotion/Fall Prevention: patient off unit  Goal: Optimal Comfort and Wellbeing  Outcome: Ongoing, Progressing  Goal: Readiness for Transition of Care  Outcome: Ongoing, Progressing     Problem: Fall Injury Risk  Goal: Absence of Fall and Fall-Related Injury  Outcome: Ongoing, Progressing  Intervention: Promote Injury-Free Environment  Recent Flowsheet Documentation  Taken 10/6/2020 1200 by Barbara Sung RN  Safety Promotion/Fall Prevention:   activity supervised   safety round/check completed  Taken 10/6/2020 1000 by Barbara Sung RN  Safety Promotion/Fall Prevention: patient off unit     Problem: Adjustment to Illness COPD (Chronic Obstructive Pulmonary Disease)  Goal: Optimal Chronic Illness Coping  Outcome: Ongoing, Progressing     Problem: Functional Ability Impaired COPD (Chronic Obstructive Pulmonary Disease)  Goal: Optimal Level of Functional Refugio  Outcome: Ongoing, Progressing  Intervention: Optimize Functional Ability  Recent Flowsheet Documentation  Taken 10/6/2020 1200 by Barbara Sung RN  Activity Management: up in chair     Problem: Infection COPD (Chronic Obstructive Pulmonary Disease)  Goal: Absence of Infection Signs and Symptoms  Outcome: Ongoing, Progressing     Problem: Oral Intake  Inadequate COPD (Chronic Obstructive Pulmonary Disease)  Goal: Improved Nutrition Intake  Outcome: Ongoing, Progressing     Problem: Respiratory Compromise COPD (Chronic Obstructive Pulmonary Disease)  Goal: Effective Oxygenation and Ventilation  Outcome: Ongoing, Progressing  Intervention: Promote Airway Secretion Clearance  Recent Flowsheet Documentation  Taken 10/6/2020 1200 by Barbara Sung, RN  Activity Management: up in chair   Goal Outcome Evaluation:  Plan of Care Reviewed With: patient  Progress: improving  Outcome Summary: bronchoscopy completed; IV abx started; tolerating regular soft diet; no c/o pain; productive cough

## 2020-10-06 NOTE — PLAN OF CARE
Goal Outcome Evaluation:  Plan of Care Reviewed With: patient  Progress: improving  Outcome Summary: VSS. more congested today despite scheduled treatments. plan for bronch tomorrow. pt sat up in chair most of day. back pain controlled with prn medication.

## 2020-10-06 NOTE — PLAN OF CARE
Problem: Adult Inpatient Plan of Care  Goal: Plan of Care Review  Flowsheets (Taken 10/6/2020 0621)  Progress: improving  Plan of Care Reviewed With: patient  Outcome Summary: npo after midnight for broncoscopy congested but not as bad as has been cough productive of yellow sputum pain controlled with pain med   Goal Outcome Evaluation:  Plan of Care Reviewed With: patient  Progress: improving  Outcome Summary: npo after midnight for broncoscopy congested but not as bad as has been cough productive of yellow sputum pain controlled with pain med

## 2020-10-06 NOTE — PROGRESS NOTES
Pharmacy Consult: Warfarin Dosing/ Monitoring    Caitlyn Longoria is a 85 y.o. female, estimated creatinine clearance is 55.4 mL/min (by C-G formula based on SCr of 0.78 mg/dL). weighing 95.7 kg (211 lb).    She has a past medical history of Aortic calcification (CMS/HCC), Aortic regurgitation, Asthma, Atrial fibrillation (CMS/HCC), CAD (coronary artery disease), Chronic combined systolic and diastolic congestive heart failure (CMS/HCC), CKD (chronic kidney disease) stage 3, GFR 30-59 ml/min, Coronary artery disease involving native coronary artery of native heart with angina pectoris (CMS/HCC), Disc degeneration, lumbar, DM type 2 (diabetes mellitus, type 2) (CMS/[facility]), GERD (gastroesophageal reflux disease), History of aneurysm, History of blood transfusion, History of fracture, History of vitamin D deficiency, Hyperlipidemia, Hypertension, Mild mitral regurgitation, Mitral annular calcification, PAF (paroxysmal atrial fibrillation) (CMS/[facility]), Peripheral neuropathy, Skin cancer, Sleep apnea, SSS (sick sinus syndrome) (CMS/[facility]), Stroke (cerebrum) (CMS/[facility]), and Tricuspid regurgitation.    Social History     Tobacco Use    Smoking status: Never Smoker    Smokeless tobacco: Never Used    Tobacco comment: caffeine use- soda   Substance Use Topics    Alcohol use: Not Currently    Drug use: No       Results from last 7 days   Lab Units 10/06/20  0710 10/05/20  0513 10/04/20  0619 10/03/20  0830 10/02/20  0747 10/01/20  0710 09/30/20  0702 09/29/20  1755   INR  1.62* 1.59* 1.70* 1.98* 2.31* 3.60* 3.78* 3.41*   HEMOGLOBIN g/dL 11.8* 11.2* 11.2* 12.0 12.4 11.3*  --  12.7   HEMATOCRIT % 36.0 34.3 35.1 39.1 38.8 35.1  --  39.2   PLATELETS 10*3/mm3 171 150 136* 162 158 145  --  158     Results from last 7 days   Lab Units 10/06/20  0710 10/05/20  0513 10/04/20  0619  10/02/20  0747 10/01/20  0710  09/29/20  1755   SODIUM mmol/L 139 136 133*   < > 139 140   < > 141   POTASSIUM mmol/L 3.9 3.5 3.8   < > 3.7 3.9   < > 3.2*    CHLORIDE mmol/L 99 99 96*   < > 101 103   < > 105   CO2 mmol/L 30.8* 26.5 30.5*   < > 31.1* 27.4   < > 22.9   BUN mg/dL 16 18 20   < > 25* 18   < > 16   CREATININE mg/dL 0.78 0.85 0.80   < > 0.87 0.90   < > 1.00   CALCIUM mg/dL 8.4* 8.5* 8.3*   < > 8.4* 8.5*   < > 9.1   BILIRUBIN mg/dL  --   --   --   --  0.5 0.4  --  0.8   ALK PHOS U/L  --   --   --   --  106 91  --  105   ALT (SGPT) U/L  --   --   --   --  25 19  --  23   AST (SGOT) U/L  --   --   --   --  22 16  --  17   GLUCOSE mg/dL 150* 157* 158*   < > 148* 189*   < > 131*    < > = values in this interval not displayed.     Anticoagulation history: h/o atrial fibrillation managed on warfarin through [facility]. As of 9/23 regimen noted as: Warfarin 2mg Su/T/W/Th/Sa and Warfarin 4mg M/F     Hospital Anticoagulation:  Consulting provider: Dr. Yoo  Start date: 9/30  Indication: Atrial Fibrillation  Target INR: 2-3  Expected duration: Indefinite   Bridge Therapy: No                  Date 9/29 9/30  10/1 10/2  10/3  10/4  10/5   10/6       INR 3.41 3.78  3.6 2.31  1.98  1.70  1.59         Warfarin dose 0 0 0  2  mg  3mg  3mg  held per md   3mg          Potential drug interactions:    d/c'ed 10/2  Cefepime  Crestor (home med, Moderate): May enhance anticoagulant effect of warfarin  Pantoprazole (Moderate, home med): May enhance anticoagulant effect of warfarin.  Acetaminophen (prn) - may result in increased risk of bleeding     -- once dose doxy / ctx on 9/29      Relevant nutrition status: Reg diet, 75% meals      Other:      Education complete?/ Date: Not at this time     Assessment/Plan:  INR slightly subtherapeutic d/t held doses.  Bronchoscopy completed today.  Resume warfarin tonight at 3mg.  Monitor INR , DDI, s/sx bleeding.    Pharmacy will continue to follow until discharge or discontinuation of warfarin.     Tatum Waldrop RPH  10/6/2020

## 2020-10-06 NOTE — PROGRESS NOTES
Patient had a bronchoscopy, she had evidence of significant purulent secretion filling up most of the subsegment of the left lung causing significant burden, very thick to expectorate despite mucolytic therapy  This is progressive despite inhaled antibiotic  Patient is to go back on her initial regimen with better IV coverage for Pseudomonas organism  We will hold on the Keflex and put the patient on IV cefepime  Keflex can be resumed once her course of cefepime is completed  Continue with inhaled tobramycin on top of that

## 2020-10-07 ENCOUNTER — APPOINTMENT (OUTPATIENT)
Dept: GENERAL RADIOLOGY | Facility: HOSPITAL | Age: 85
End: 2020-10-07

## 2020-10-07 LAB
ANION GAP SERPL CALCULATED.3IONS-SCNC: 7.1 MMOL/L (ref 5–15)
ARTERIAL PATENCY WRIST A: POSITIVE
ATMOSPHERIC PRESS: 748.4 MMHG
BASE EXCESS BLDA CALC-SCNC: 3.3 MMOL/L (ref 0–2)
BDY SITE: ABNORMAL
BUN SERPL-MCNC: 17 MG/DL (ref 8–23)
BUN/CREAT SERPL: 20.5 (ref 7–25)
CALCIUM SPEC-SCNC: 8.2 MG/DL (ref 8.6–10.5)
CHLORIDE SERPL-SCNC: 97 MMOL/L (ref 98–107)
CO2 SERPL-SCNC: 30.9 MMOL/L (ref 22–29)
CREAT SERPL-MCNC: 0.83 MG/DL (ref 0.57–1)
DEPRECATED RDW RBC AUTO: 45.3 FL (ref 37–54)
ERYTHROCYTE [DISTWIDTH] IN BLOOD BY AUTOMATED COUNT: 12.8 % (ref 12.3–15.4)
GAS FLOW AIRWAY: 2 LPM
GFR SERPL CREATININE-BSD FRML MDRD: 65 ML/MIN/1.73
GLUCOSE BLDC GLUCOMTR-MCNC: 127 MG/DL (ref 70–130)
GLUCOSE BLDC GLUCOMTR-MCNC: 127 MG/DL (ref 70–130)
GLUCOSE BLDC GLUCOMTR-MCNC: 157 MG/DL (ref 70–130)
GLUCOSE SERPL-MCNC: 147 MG/DL (ref 65–99)
HCO3 BLDA-SCNC: 28.9 MMOL/L (ref 22–28)
HCT VFR BLD AUTO: 36.9 % (ref 34–46.6)
HGB BLD-MCNC: 11.6 G/DL (ref 12–15.9)
INR PPP: 1.67 (ref 0.9–1.1)
LYMPHOCYTES # BLD MANUAL: 7.46 10*3/MM3 (ref 0.7–3.1)
LYMPHOCYTES NFR BLD MANUAL: 53 % (ref 19.6–45.3)
LYMPHOCYTES NFR BLD MANUAL: 6 % (ref 5–12)
MCH RBC QN AUTO: 30.1 PG (ref 26.6–33)
MCHC RBC AUTO-ENTMCNC: 31.4 G/DL (ref 31.5–35.7)
MCV RBC AUTO: 95.8 FL (ref 79–97)
MODALITY: ABNORMAL
MONOCYTES # BLD AUTO: 0.84 10*3/MM3 (ref 0.1–0.9)
NEUTROPHILS # BLD AUTO: 5.77 10*3/MM3 (ref 1.7–7)
NEUTROPHILS NFR BLD MANUAL: 41 % (ref 42.7–76)
PCO2 BLDA: 46.9 MM HG (ref 35–45)
PH BLDA: 7.4 PH UNITS (ref 7.35–7.45)
PLAT MORPH BLD: NORMAL
PLATELET # BLD AUTO: 173 10*3/MM3 (ref 140–450)
PMV BLD AUTO: 9.5 FL (ref 6–12)
PO2 BLDA: 62.4 MM HG (ref 80–100)
POTASSIUM SERPL-SCNC: 4 MMOL/L (ref 3.5–5.2)
PROTHROMBIN TIME: 19.3 SECONDS (ref 11.7–14.2)
RBC # BLD AUTO: 3.85 10*6/MM3 (ref 3.77–5.28)
RBC MORPH BLD: NORMAL
SAO2 % BLDCOA: 91.2 % (ref 92–99)
SODIUM SERPL-SCNC: 135 MMOL/L (ref 136–145)
TOTAL RATE: 25 BREATHS/MINUTE
WBC # BLD AUTO: 14.07 10*3/MM3 (ref 3.4–10.8)
WBC MORPH BLD: NORMAL

## 2020-10-07 PROCEDURE — 85025 COMPLETE CBC W/AUTO DIFF WBC: CPT | Performed by: HOSPITALIST

## 2020-10-07 PROCEDURE — 82962 GLUCOSE BLOOD TEST: CPT

## 2020-10-07 PROCEDURE — 25010000002 CEFEPIME PER 500 MG: Performed by: INTERNAL MEDICINE

## 2020-10-07 PROCEDURE — 82803 BLOOD GASES ANY COMBINATION: CPT

## 2020-10-07 PROCEDURE — 97116 GAIT TRAINING THERAPY: CPT

## 2020-10-07 PROCEDURE — 80048 BASIC METABOLIC PNL TOTAL CA: CPT | Performed by: HOSPITALIST

## 2020-10-07 PROCEDURE — 85610 PROTHROMBIN TIME: CPT | Performed by: HOSPITALIST

## 2020-10-07 PROCEDURE — 36600 WITHDRAWAL OF ARTERIAL BLOOD: CPT

## 2020-10-07 PROCEDURE — 71045 X-RAY EXAM CHEST 1 VIEW: CPT

## 2020-10-07 PROCEDURE — 97110 THERAPEUTIC EXERCISES: CPT

## 2020-10-07 PROCEDURE — 63710000001 TOBRAMYCIN PER 300 MG: Performed by: INTERNAL MEDICINE

## 2020-10-07 PROCEDURE — 25010000002 METHYLPREDNISOLONE PER 125 MG: Performed by: INTERNAL MEDICINE

## 2020-10-07 PROCEDURE — 85007 BL SMEAR W/DIFF WBC COUNT: CPT | Performed by: HOSPITALIST

## 2020-10-07 PROCEDURE — 94799 UNLISTED PULMONARY SVC/PX: CPT

## 2020-10-07 PROCEDURE — 25010000002 FUROSEMIDE PER 20 MG: Performed by: INTERNAL MEDICINE

## 2020-10-07 RX ORDER — FUROSEMIDE 10 MG/ML
40 INJECTION INTRAMUSCULAR; INTRAVENOUS ONCE
Status: COMPLETED | OUTPATIENT
Start: 2020-10-07 | End: 2020-10-07

## 2020-10-07 RX ORDER — METHYLPREDNISOLONE SODIUM SUCCINATE 125 MG/2ML
125 INJECTION, POWDER, LYOPHILIZED, FOR SOLUTION INTRAMUSCULAR; INTRAVENOUS ONCE
Status: COMPLETED | OUTPATIENT
Start: 2020-10-07 | End: 2020-10-07

## 2020-10-07 RX ORDER — WARFARIN SODIUM 3 MG/1
3 TABLET ORAL
Status: DISCONTINUED | OUTPATIENT
Start: 2020-10-07 | End: 2020-10-10

## 2020-10-07 RX ADMIN — BUDESONIDE AND FORMOTEROL FUMARATE DIHYDRATE 2 PUFF: 160; 4.5 AEROSOL RESPIRATORY (INHALATION) at 11:09

## 2020-10-07 RX ADMIN — HYDROCODONE BITARTRATE AND ACETAMINOPHEN 1 TABLET: 5; 325 TABLET ORAL at 13:57

## 2020-10-07 RX ADMIN — FUROSEMIDE 20 MG: 20 TABLET ORAL at 07:19

## 2020-10-07 RX ADMIN — Medication 4 ML: at 19:55

## 2020-10-07 RX ADMIN — CARVEDILOL 3.12 MG: 3.12 TABLET, FILM COATED ORAL at 20:00

## 2020-10-07 RX ADMIN — HYDROCODONE BITARTRATE AND ACETAMINOPHEN 1 TABLET: 5; 325 TABLET ORAL at 07:24

## 2020-10-07 RX ADMIN — METHYLPREDNISOLONE SODIUM SUCCINATE 125 MG: 125 INJECTION, POWDER, FOR SOLUTION INTRAMUSCULAR; INTRAVENOUS at 17:38

## 2020-10-07 RX ADMIN — CARVEDILOL 3.12 MG: 3.12 TABLET, FILM COATED ORAL at 08:32

## 2020-10-07 RX ADMIN — MONTELUKAST SODIUM 10 MG: 10 TABLET, FILM COATED ORAL at 20:00

## 2020-10-07 RX ADMIN — Medication 4 ML: at 11:09

## 2020-10-07 RX ADMIN — WARFARIN 3 MG: 3 TABLET ORAL at 18:40

## 2020-10-07 RX ADMIN — FLUTICASONE PROPIONATE 1 SPRAY: 50 SPRAY, METERED NASAL at 08:33

## 2020-10-07 RX ADMIN — LOSARTAN POTASSIUM 25 MG: 25 TABLET, FILM COATED ORAL at 08:32

## 2020-10-07 RX ADMIN — PANTOPRAZOLE SODIUM 40 MG: 40 TABLET, DELAYED RELEASE ORAL at 07:19

## 2020-10-07 RX ADMIN — IPRATROPIUM BROMIDE AND ALBUTEROL SULFATE 3 ML: 2.5; .5 SOLUTION RESPIRATORY (INHALATION) at 15:07

## 2020-10-07 RX ADMIN — TOBRAMYCIN 300 MG: 1.2 INJECTION, POWDER, LYOPHILIZED, FOR SOLUTION INTRAVENOUS at 11:09

## 2020-10-07 RX ADMIN — OXAZEPAM 10 MG: 10 CAPSULE, GELATIN COATED ORAL at 20:00

## 2020-10-07 RX ADMIN — TOBRAMYCIN 300 MG: 1.2 INJECTION, POWDER, LYOPHILIZED, FOR SOLUTION INTRAVENOUS at 21:00

## 2020-10-07 RX ADMIN — ASPIRIN 81 MG: 81 TABLET, COATED ORAL at 08:32

## 2020-10-07 RX ADMIN — GUAIFENESIN 600 MG: 600 TABLET, EXTENDED RELEASE ORAL at 20:00

## 2020-10-07 RX ADMIN — GUAIFENESIN 600 MG: 600 TABLET, EXTENDED RELEASE ORAL at 08:32

## 2020-10-07 RX ADMIN — CEFEPIME HYDROCHLORIDE 2 G: 2 INJECTION, POWDER, FOR SOLUTION INTRAVENOUS at 02:16

## 2020-10-07 RX ADMIN — GLIPIZIDE 2.5 MG: 5 TABLET ORAL at 07:24

## 2020-10-07 RX ADMIN — IPRATROPIUM BROMIDE AND ALBUTEROL SULFATE 3 ML: 2.5; .5 SOLUTION RESPIRATORY (INHALATION) at 19:55

## 2020-10-07 RX ADMIN — OXYBUTYNIN CHLORIDE 10 MG: 10 TABLET, EXTENDED RELEASE ORAL at 20:00

## 2020-10-07 RX ADMIN — FLUTICASONE PROPIONATE 1 SPRAY: 50 SPRAY, METERED NASAL at 20:01

## 2020-10-07 RX ADMIN — IPRATROPIUM BROMIDE AND ALBUTEROL SULFATE 3 ML: 2.5; .5 SOLUTION RESPIRATORY (INHALATION) at 11:09

## 2020-10-07 RX ADMIN — FUROSEMIDE 40 MG: 10 INJECTION, SOLUTION INTRAMUSCULAR; INTRAVENOUS at 17:38

## 2020-10-07 RX ADMIN — SODIUM CHLORIDE, PRESERVATIVE FREE 10 ML: 5 INJECTION INTRAVENOUS at 08:33

## 2020-10-07 RX ADMIN — ROSUVASTATIN CALCIUM 20 MG: 20 TABLET, FILM COATED ORAL at 20:00

## 2020-10-07 RX ADMIN — HYDROCODONE BITARTRATE AND ACETAMINOPHEN 1 TABLET: 5; 325 TABLET ORAL at 18:44

## 2020-10-07 RX ADMIN — CEFEPIME HYDROCHLORIDE 2 G: 2 INJECTION, POWDER, FOR SOLUTION INTRAVENOUS at 13:59

## 2020-10-07 NOTE — THERAPY PROGRESS REPORT/RE-CERT
Patient Name: Caitlyn Longoria  : 1934    MRN: [id number]                              Today's Date: 10/7/2020       Admit Date: 2020    Visit Dx:     ICD-10-CM ICD-9-CM   1. Pneumonia due to gram-negative bacteria (CMS/[facility])  J15.6 482.83   2. Dyspnea, unspecified type  R06.00 786.09   3. Pneumonia due to infectious organism, unspecified laterality, unspecified part of lung  J18.9 486   4. Leukocytosis, unspecified type  D72.829 288.60     Patient Active Problem List   Diagnosis   • Neck and shoulder pain   • Arthropathy of shoulder region   • Carpal tunnel syndrome of left wrist   • Chronic pain of both shoulders   • Chronic left shoulder pain   • Arthropathy of left shoulder   • Dysarthria   • DM II (diabetes mellitus, type II), controlled (CMS/[facility])   • Paroxysmal atrial fibrillation (CMS/[facility])   • HLD (hyperlipidemia)   • Chronic bronchitis (CMS/[facility])   • Coronary artery disease involving native coronary artery of native heart with angina pectoris (CMS/[facility])   • CVA (cerebral vascular accident) (CMS/[facility])   • HTN (hypertension)   • CKD (chronic kidney disease), stage III   • Chronic combined systolic and diastolic congestive heart failure (CMS/[facility])   • Infection of prosthetic right knee joint (CMS/[facility])   • Stasis dermatitis of right lower extremity due to peripheral venous hypertension   • Current use of long term anticoagulation   • Morbid obesity (CMS/[facility])   • Acute respiratory failure with hypoxia (CMS/[facility])   • Rhinovirus   • Asthma with acute exacerbation   • Acute respiratory failure with hypoxemia (CMS/[facility])   • Viral pneumonia   • Hypoxia   • CLL (chronic lymphoid leukemia) in relapse (CMS/[facility])   • Dyspnea   • Anticoagulated on Coumadin   • Closed wedge compression fracture of T5 vertebra (CMS/[facility])   • Pneumonia due to gram-negative bacteria (CMS/[facility])     Past Medical History:   Diagnosis Date   • Aortic calcification (CMS/[facility])     mild, on echo 2017   • Aortic regurgitation     Trace   •  Asthma    • Atrial fibrillation (CMS/[facility])    • CAD (coronary artery disease)    • Chronic combined systolic and diastolic congestive heart failure (CMS/[facility])    • CKD (chronic kidney disease) stage 3, GFR 30-59 ml/min    • Coronary artery disease involving native coronary artery of native heart with angina pectoris (CMS/[facility])    • Disc degeneration, lumbar    • DM type 2 (diabetes mellitus, type 2) (CMS/[facility])    • GERD (gastroesophageal reflux disease)    • History of aneurysm     right femoral artery s/p LHC   • History of blood transfusion    • History of fracture    • History of vitamin D deficiency    • Hyperlipidemia    • Hypertension    • Mild mitral regurgitation    • Mitral annular calcification     12/8/2017- echo, moderate   • PAF (paroxysmal atrial fibrillation) (CMS/[facility])    • Peripheral neuropathy    • Skin cancer     Left hand   • Sleep apnea    • SSS (sick sinus syndrome) (CMS/[facility])    • Stroke (cerebrum) (CMS/[facility])    • Tricuspid regurgitation     Trace     Past Surgical History:   Procedure Laterality Date   • BRONCHOSCOPY Bilateral 10/6/2020    Procedure: BRONCHOSCOPY with BILATERAL LUNG washings;  Surgeon: Juno Coburn MD;  Location: [facility] ENDOSCOPY;  Service: Pulmonary;  Laterality: Bilateral;  PRE: purulent bronchitis  POST: PURULENT BRONCHITIS   • CARDIAC CATHETERIZATION     • CARDIAC ELECTROPHYSIOLOGY PROCEDURE N/A 2/7/2020    Procedure: PPM generator change - dual  medtronic;  Surgeon: Kiel Field MD;  Location: [facility] CATH INVASIVE LOCATION;  Service: Cardiology;  Laterality: N/A;   • CHOLECYSTECTOMY     • CORONARY STENT PLACEMENT     • HERNIA REPAIR      hital hernia   • HYSTERECTOMY     • PACEMAKER IMPLANTATION     • REPLACEMENT TOTAL KNEE Bilateral      General Information     Row Name 10/07/20 1304          Physical Therapy Time and Intention    Document Type  therapy note (daily note)  -EE     Mode of Treatment  physical therapy  -EE     Row Name 10/07/20 1303          General  Information    Existing Precautions/Restrictions  fall;oxygen therapy device and L/min;brace worn when out of bed  -EE     Barriers to Rehab  medically complex  -EE     Row Name 10/07/20 1303          Cognition    Orientation Status (Cognition)  oriented x 4  -EE     Row Name 10/07/20 1303          Safety Issues, Functional Mobility    Impairments Affecting Function (Mobility)  endurance/activity tolerance;shortness of breath  -EE       User Key  (r) = Recorded By, (t) = Taken By, (c) = Cosigned By    Initials Name Provider Type    EE Patricia Weaver, PT Physical Therapist        Mobility     Row Name 10/07/20 1303          Bed Mobility    Comment (Bed Mobility)  not tested; pt up in chair  -EE     Row Name 10/07/20 1303          Transfers    Comment (Transfers)  sit to stand x3 from chair surface  -EE     Row Name 10/07/20 1303          Sit-Stand Transfer    Sit-Stand [place] (Transfers)  supervision  -EE     Assistive Device (Sit-Stand Transfers)  walker, front-wheeled  -EE     Row Name 10/07/20 1303          Gait/Stairs (Locomotion)    [place] Level (Gait)  supervision  -EE     Assistive Device (Gait)  walker, front-wheeled  -EE     Distance in Feet (Gait)  210'  -EE     Deviations/Abnormal Patterns (Gait)  jeremi decreased;stride length decreased  -EE     Bilateral Gait Deviations  forward flexed posture;heel strike decreased  -EE     Comment (Gait/Stairs)  2 standing rest breaks required due to fatigue, pt ambulating on 3 L O2 via nc. Also ambulated 30' in room with rwx and supervision on room air.  -EE       User Key  (r) = Recorded By, (t) = Taken By, (c) = Cosigned By    Initials Name Provider Type    EE Patricia Weaver, PT Physical Therapist        Obj/Interventions     Row Name 10/07/20 1304          Motor Skills    Therapeutic Exercise  -- seated B ankle pumps, LAQ, hip abd/add, and hip flex x10 reps.  -EE     Row Name 10/07/20 1304          Balance    Balance Interventions  static;standing  -EE      Comment, Balance  static standing with supervision and rwx for 3 min  -EE       User Key  (r) = Recorded By, (t) = Taken By, (c) = Cosigned By    Initials Name Provider Type    EE Patricia Weaver, PT Physical Therapist        Goals/Plan     Row Name 10/07/20 1308          Bed Mobility Goal 1 (PT)    Activity/Assistive Device (Bed Mobility Goal 1, PT)  sit to supine;supine to sit  -EE     [place] Level/Cues Needed (Bed Mobility Goal 1, PT)  supervision required  -EE     Time Frame (Bed Mobility Goal 1, PT)  1 week  -EE     Progress/Outcomes (Bed Mobility Goal 1, PT)  goal ongoing  -EE     Row Name 10/07/20 1308          Transfer Goal 1 (PT)    Progress/Outcome (Transfer Goal 1, PT)  goal met  -EE     Row Name 10/07/20 1308          Gait Training Goal 1 (PT)    Activity/Assistive Device (Gait Training Goal 1, PT)  gait (walking locomotion);assistive device use  -EE     [place] Level (Gait Training Goal 1, PT)  supervision required  -EE     Distance (Gait Training Goal 1, PT)  300  -EE     Time Frame (Gait Training Goal 1, PT)  1 week  -EE     Progress/Outcome (Gait Training Goal 1, PT)  goal revised this date;good progress toward goal  -EE     Row Name 10/07/20 1308          Patient Education Goal (PT)    Activity (Patient Education Goal, PT)  Donning/doffing TLSO independently previous goal for HEP achieved; new goal as follows  -EE     [place]/Cues/Accuracy (Memory Goal 2, PT)  independent;demonstrates adequately  -EE     Time Frame (Patient Education Goal, PT)  1 week  -EE       User Key  (r) = Recorded By, (t) = Taken By, (c) = Cosigned By    Initials Name Provider Type    EE Patricia Weaver, PT Physical Therapist        Clinical Impression     Row Name 10/07/20 130          Pain    Additional Documentation  Pain Scale: Numbers Pre/Post-Treatment (Group)  -EE     Row Name 10/07/20 1305          Pain Scale: Numbers Pre/Post-Treatment    Pretreatment Pain Rating  3/10  -EE     Posttreatment Pain Rating   3/10  -EE     Pain Location  back  -EE     Pain Intervention(s)  Repositioned;Rest  -EE     Row Name 10/07/20 130          Plan of Care Review    Plan of Care Reviewed With  patient  -EE     Progress  improving  -EE     Outcome Summary  Pt demonstrates good progress with mobility. Demos improved strength and endurance, as evidenced by tolerance of increased ambulation distance today. Able to maintain sats while ambulating on 3 L O2 via nc. Pt also able to maintain sats > 90% while ambulating short distance in room while on room air. Pt ambulating well enough to continue ambulating with nursing supervision in addition to PT sessions. Will plan to focus on bed mobility as well as donning/doffing TLSO at next session, as pt expressed concern over doing these tasks independently and pt lives in ind. living facility.  -EE     Row Name 10/07/20 1306          Therapy Assessment/Plan (PT)    Rehab Potential (PT)  good, to achieve stated therapy goals  -EE     Criteria for Skilled Interventions Met (PT)  yes;skilled treatment is necessary  -EE     Row Name 10/07/20 1302          Vital Signs    Pre SpO2 (%)  97  -EE     O2 Delivery Pre Treatment  supplemental O2  -EE     Intra SpO2 (%)  91 after ambulating 30' in room on room air  -EE     O2 Delivery Intra Treatment  room air  -EE     Post SpO2 (%)  97 following longer ambulation distance in hallway on 3 L O2  -EE     O2 Delivery Post Treatment  supplemental O2  -EE     Pre Patient Position  Sitting  -EE     Intra Patient Position  Standing  -EE     Post Patient Position  Sitting  -EE     Rest Breaks   2  -EE     Row Name 10/07/20 1307          Positioning and Restraints    Pre-Treatment Position  sitting in chair/recliner  -EE     Post Treatment Position  chair  -EE     In Chair  reclined;call light within reach;encouraged to call for assist;legs elevated;notified nsg  -EE       User Key  (r) = Recorded By, (t) = Taken By, (c) = Cosigned By    Initials Name Provider Type     Patricia Greene, CHERRY Physical Therapist        Outcome Measures     Row Name 10/07/20 1309          How much help from another person do you currently need...    Turning from your back to your side while in flat bed without using bedrails?  3  -EE     Moving from lying on back to sitting on the side of a flat bed without bedrails?  3  -EE     Moving to and from a bed to a chair (including a wheelchair)?  3  -EE     Standing up from a chair using your arms (e.g., wheelchair, bedside chair)?  3  -EE     Climbing 3-5 steps with a railing?  2  -EE     To walk in hospital room?  3  -EE     AM-PAC 6 Clicks Score (PT)  17  -EE     Row Name 10/07/20 1309          Functional Assessment    Outcome Measure Options  AM-PAC 6 Clicks Basic Mobility (PT)  -EE       User Key  (r) = Recorded By, (t) = Taken By, (c) = Cosigned By    Initials Name Provider Type    Patricia Greene, CHERRY Physical Therapist        Physical Therapy Education                 Title: PT OT SLP Therapies (Done)     Topic: Physical Therapy (Done)     Point: Mobility training (Done)     Learning Progress Summary           Patient Acceptance, E,TB, VU,NR by EE at 10/7/2020 1309    Acceptance, E, VU by PC at 10/7/2020 0435    Acceptance, E, VU by AL at 10/4/2020 1234    Acceptance, E,TB, VU,DU by JANY at 10/3/2020 1243    Eager, E,TB,D, VU,NR by LIBRA at 10/2/2020 1717    Eager, TB, DU,NR by MARIBELL at 10/1/2020 1558    Comment: review TLSO                   Point: Home exercise program (Done)     Learning Progress Summary           Patient Acceptance, E,TB, VU,NR by EE at 10/7/2020 1309    Acceptance, E, VU by PC at 10/7/2020 0435    Acceptance, E, VU by AL at 10/4/2020 1234    Acceptance, E,TB, VU,DU by JANY at 10/3/2020 1243    Eager, E,TB,D, VU,NR by LIBRA at 10/2/2020 1717    Eager, TB, DU,NR by MARIBELL at 10/1/2020 1558    Comment: review TLSO                   Point: Body mechanics (Done)     Learning Progress Summary           Patient Acceptance, E,TB, VU,NR by EE at 10/7/2020  1309    Acceptance, E, VU by PC at 10/7/2020 0435    Acceptance, E, VU by AL at 10/4/2020 1234    Acceptance, E,TB, VU,DU by LB at 10/3/2020 1243    Eager, E,TB,D, VU,NR by JM at 10/2/2020 1717    Eager, TB, DU,NR by DJ at 10/1/2020 1558    Comment: review TLSO                   Point: Precautions (Done)     Learning Progress Summary           Patient Acceptance, E,TB, VU,NR by  at 10/7/2020 1309    Acceptance, E, VU by  at 10/7/2020 0435    Acceptance, E, VU by AL at 10/4/2020 1234    Acceptance, E,TB, VU,DU by LB at 10/3/2020 1243    Eager, E,TB,D, VU,NR by  at 10/2/2020 1717    Eager, TB, DU,NR by DJ at 10/1/2020 1558    Comment: review TLSO                               User Key     Initials Effective Dates Name Provider Type Discipline     06/16/16 -  Lisy Hartman, RN Registered Nurse Nurse     04/03/18 -  Patricia Weaver, PT Physical Therapist PT    AL 04/03/18 -  Sonia Galan, PT Physical Therapist PT     03/07/18 -  Jaylin Saunders PTA Physical Therapy Assistant PT    LB 08/09/20 -  Rosalinda Lawrence, PT Physical Therapist PT     10/25/19 -  Edie Lowry, PT Physical Therapist PT              PT Recommendation and Plan  Planned Therapy Interventions (PT): balance training, bed mobility training, gait training, home exercise program, patient/family education, strengthening, stretching, transfer training  Plan of Care Reviewed With: patient  Progress: improving  Outcome Summary: Pt demonstrates good progress with mobility. Demos improved strength and endurance, as evidenced by tolerance of increased ambulation distance today. Able to maintain sats while ambulating on 3 L O2 via nc. Pt also able to maintain sats > 90% while ambulating short distance in room while on room air. Pt ambulating well enough to continue ambulating with nursing supervision in addition to PT sessions. Will plan to focus on bed mobility as well as donning/doffing TLSO at next session, as pt expressed concern over doing these  tasks independently and pt lives in [facility] facility.     Time Calculation:   PT Charges     Row Name 10/07/20 1104             Time Calculation    Start Time  1012  -EE      Stop Time  1036  -EE      Time Calculation (min)  24 min  -EE      PT Received On  10/07/20  -EE      PT - Next Appointment  10/08/20  -EE      PT Goal Re-Cert Due Date  10/14/20  -EE         Time Calculation- PT    Total Timed Code Minutes- PT  24 minute(s)  -EE        User Key  (r) = Recorded By, (t) = Taken By, (c) = Cosigned By    Initials Name Provider Type    EE Patricia Weaver, CHERRY Physical Therapist        Therapy Charges for Today     Code Description Service Date Service Provider Modifiers Qty    88296154328 HC PT THER PROC EA 15 MIN 10/7/2020 Patricia Weaver, PT GP 1    [id number] HC GAIT TRAINING EA 15 MIN 10/7/2020 Patricia Weaver, PT GP 1          PT G-Codes  Outcome Measure Options: AM-PAC 6 Clicks Basic Mobility (PT)  AM-PAC 6 Clicks Score (PT): 17     Patient was intermittently wearing a face mask during this therapy encounter. Therapist used appropriate personal protective equipment including eye protection, mask, and gloves.  Mask used was standard procedure mask. Appropriate PPE was worn during the entire therapy session. Hand hygiene was completed before and after therapy session. Patient is not in enhanced droplet precautions.       Patricia Weaver PT  10/7/2020

## 2020-10-07 NOTE — PLAN OF CARE
Problem: Adult Inpatient Plan of Care  Goal: Plan of Care Review  Flowsheets (Taken 10/7/2020 0435)  Progress: improving  Plan of Care Reviewed With: patient  Outcome Summary: states feels better after bronch wery hoarse voice this am states cough productive of yellowish sputum states is breathing better 02 at 3 liters   Goal Outcome Evaluation:  Plan of Care Reviewed With: patient  Progress: improving  Outcome Summary: states feels better after bronch wery hoarse voice this am states cough productive of yellowish sputum states is breathing better 02 at 3 liters

## 2020-10-07 NOTE — PLAN OF CARE
Problem: Adult Inpatient Plan of Care  Goal: Plan of Care Review  Recent Flowsheet Documentation  Taken 10/7/2020 1305 by Patricia Weaver PT  Progress: improving  Plan of Care Reviewed With: patient  Outcome Summary: Pt demonstrates good progress with mobility. Demos improved strength and endurance, as evidenced by tolerance of increased ambulation distance today. Able to maintain sats while ambulating on 3 L O2 via nc. Pt also able to maintain sats > 90% while ambulating short distance in room while on room air. Pt ambulating well enough to continue ambulating with nursing supervision in addition to PT sessions. Will plan to focus on bed mobility as well as donning/doffing TLSO at next session, as pt expressed concern over doing these tasks independently and pt lives in ind. living facility.

## 2020-10-07 NOTE — PLAN OF CARE
Problem: Adult Inpatient Plan of Care  Goal: Plan of Care Review  Outcome: Ongoing, Progressing  Flowsheets (Taken 10/7/2020 1441)  Progress: improving  Plan of Care Reviewed With: patient  Outcome Summary:   loose productive cough with small amount of expectorant   continuing iv ABX and bx tx   c/o pain prn pain meds given, up ambulating in hallway with PT   AAOx4   tolerating regular diet  Goal: Patient-Specific Goal (Individualized)  Outcome: Ongoing, Progressing  Goal: Absence of Hospital-Acquired Illness or Injury  Outcome: Ongoing, Progressing  Intervention: Identify and Manage Fall Risk  Recent Flowsheet Documentation  Taken 10/7/2020 1400 by Barbara Sung RN  Safety Promotion/Fall Prevention:   nonskid shoes/slippers when out of bed   safety round/check completed  Taken 10/7/2020 1300 by Barbara Sung RN  Safety Promotion/Fall Prevention:   activity supervised   safety round/check completed  Taken 10/7/2020 0950 by Barbara Sung RN  Safety Promotion/Fall Prevention: safety round/check completed  Taken 10/7/2020 0830 by Barbara Sung RN  Safety Promotion/Fall Prevention:   activity supervised   safety round/check completed  Goal: Optimal Comfort and Wellbeing  Outcome: Ongoing, Progressing  Intervention: Provide Person-Centered Care  Recent Flowsheet Documentation  Taken 10/7/2020 0830 by Barbara Sung RN  Trust Relationship/Rapport:   care explained   questions encouraged   questions answered  Goal: Readiness for Transition of Care  Outcome: Ongoing, Progressing     Problem: Fall Injury Risk  Goal: Absence of Fall and Fall-Related Injury  Outcome: Ongoing, Progressing  Intervention: Identify and Manage Contributors to Fall Injury Risk  Recent Flowsheet Documentation  Taken 10/7/2020 0950 by Barbara Sung RN  Medication Review/Management: medications reviewed  Intervention: Promote Injury-Free Environment  Recent Flowsheet Documentation  Taken 10/7/2020 1400 by Barbara Sung RN  Safety Promotion/Fall  Prevention:   nonskid shoes/slippers when out of bed   safety round/check completed  Taken 10/7/2020 1300 by Barbara Sung RN  Safety Promotion/Fall Prevention:   activity supervised   safety round/check completed  Taken 10/7/2020 0950 by Barbara Sung RN  Safety Promotion/Fall Prevention: safety round/check completed  Taken 10/7/2020 0830 by Barbara Sung RN  Safety Promotion/Fall Prevention:   activity supervised   safety round/check completed     Problem: Adjustment to Illness COPD (Chronic Obstructive Pulmonary Disease)  Goal: Optimal Chronic Illness Coping  Outcome: Ongoing, Progressing     Problem: Functional Ability Impaired COPD (Chronic Obstructive Pulmonary Disease)  Goal: Optimal Level of Functional [place]  Outcome: Ongoing, Progressing  Intervention: Optimize Functional Ability  Recent Flowsheet Documentation  Taken 10/7/2020 0830 by Barbara Sung RN  Activity Management: up in chair     Problem: Infection COPD (Chronic Obstructive Pulmonary Disease)  Goal: Absence of Infection Signs and Symptoms  Outcome: Ongoing, Progressing     Problem: Oral Intake Inadequate COPD (Chronic Obstructive Pulmonary Disease)  Goal: Improved Nutrition Intake  Outcome: Ongoing, Progressing     Problem: Respiratory Compromise COPD (Chronic Obstructive Pulmonary Disease)  Goal: Effective Oxygenation and Ventilation  Outcome: Ongoing, Progressing  Intervention: Promote Airway Secretion Clearance  Recent Flowsheet Documentation  Taken 10/7/2020 0830 by Barbara Sung RN  Activity Management: up in chair   Goal Outcome Evaluation:  Plan of Care Reviewed With: patient  Progress: improving  Outcome Summary: loose productive cough with small amount of expectorant; continuing iv ABX and bx tx; c/o pain prn pain meds given, up ambulating in hallway with PT; AAOx4; tolerating regular diet

## 2020-10-07 NOTE — CODE DOCUMENTATION
Appears SOA but not in severe distress, able to have a conversation. Sats ae 96% on 6L. Call placed to Dr Coburn for any further orders. No RRT intervention needed at this time

## 2020-10-07 NOTE — PROGRESS NOTES
PROGRESS NOTE  Patient Name: Caitlyn Longoria  Age/Sex: 85 y.o. female  : 1934  MRN: [id number]    Date of Admission: 2020  Date of Encounter Visit: 10/07/20   LOS: 8 days   Patient Care Team:  Zenaida Suarez MD as PCP - General (Internal Medicine)  Juno Coburn MD as PCP - Claims Attributed  Pao Levi [facility] as Pharmacist  Alexander Valiente [facility] as Pharmacist (Pharmacy)  Zenaida Suarez MD as Referring Physician (Internal Medicine)  Lucien Larkin MD as Consulting Physician (Hematology and Oncology)    Chief Complaint: Still has difficulty expectorating    Hospital course: Patient had heavy growth of Pseudomonas on her sputum culture, had evidence of pneumonia on the CAT scan     Interval History: Afebrile, patient had a bronchoscopy done on 10/5/2020 with significant improvement initially however this morning she is having recurrence of the coarse rhonchi and still complaining of difficulty getting the sputum out of her lung despite aggressive pulmonary hygiene measures, the only thing we can do is vest percussion therapy because she has rib fracture and rib pain.  Patient is on mucolytic therapy.    REVIEW OF SYSTEMS:   CONSTITUTIONAL: no fever or chills  CARDIOVASCULAR: No angina type chest pain, positive chest pain from the rib fracture however, chest pressure or chest discomfort. No palpitations or edema.   RESPIRATORY: Difficulty expectorating, did get better after the bronchoscopy yesterday and got worse again today thick secretion difficult to expectorate  GASTROINTESTINAL: No anorexia, nausea, vomiting or diarrhea. No abdominal pain or blood.   HEMATOLOGIC: No bleeding or bruising.     Ventilator/Non-Invasive Ventilation Settings (From admission, onward)    None            Vital Signs  Temp:  [97.5 °F (36.4 °C)-98.1 °F (36.7 °C)] 97.6 °F (36.4 °C)  Heart Rate:  [79-93] 79  Resp:  [16-23] 16  BP: ()/(53-72) 122/66  SpO2:  [93 %-99 %] 97 %  on  Flow (L/min):  " [2-3] 3 Device (Oxygen Therapy): nasal cannula    Intake/Output Summary (Last 24 hours) at 10/7/2020 1057  Last data filed at 10/7/2020 0216  Gross per 24 hour   Intake 800 ml   Output 400 ml   Net 400 ml     Flowsheet Rows      First Filed Value   Admission Height  157.5 cm (62\") Documented at 09/29/2020 2223   Admission Weight  95.7 kg (211 lb) Documented at 09/29/2020 2223        Body mass index is 38.59 kg/m².      09/29/20 2223   Weight: 95.7 kg (211 lb)       Physical Exam:  GEN:  No acute distress, alert, cooperative, well developed, on nasal cannula oxygen  EYES:   Sclerae clear. No icterus. PERRL. Normal EOM  ENT:   External ears/nose normal, no oral lesions, no thrush, mucous membranes slightly dry  NECK:  Supple, midline trachea, no JVD  LUNGS: Normal chest on inspection, the coarse rhonchi that did improve after bronchoscopy yesterday seems to be recurring again.   CV:  Regular rhythm and rate. Normal S1/S2. No murmurs, gallops, or rubs noted.  ABD:  Soft, nontender and nondistended. Normal bowel sounds. No guarding  EXT:  Moves all extremities well. No cyanosis. No redness.  Positive edema.   Skin: Dry, intact, no bleeding    Results Review:    Results From Last 14 Days   Lab Units 09/29/20  1755   D DIMER QUANT MCGFEU/mL 0.66*   LACTATE mmol/L 1.0     Results from last 7 days   Lab Units 10/07/20  0755 10/06/20  0710 10/05/20  0513 10/04/20  0619 10/03/20  0830 10/02/20  0747 10/01/20  0710   SODIUM mmol/L 135* 139 136 133* 140 139 140   POTASSIUM mmol/L 4.0 3.9 3.5 3.8 4.1 3.7 3.9   CHLORIDE mmol/L 97* 99 99 96* 100 101 103   CO2 mmol/L 30.9* 30.8* 26.5 30.5* 29.6* 31.1* 27.4   BUN mg/dL 17 16 18 20 24* 25* 18   CREATININE mg/dL 0.83 0.78 0.85 0.80 0.90 0.87 0.90   CALCIUM mg/dL 8.2* 8.4* 8.5* 8.3* 8.6 8.4* 8.5*   AST (SGOT) U/L  --   --   --   --   --  22 16   ALT (SGPT) U/L  --   --   --   --   --  25 19   ANION GAP mmol/L 7.1 9.2 10.5 6.5 10.4 6.9 9.6   ALBUMIN g/dL  --   --   --   --   --  3.80 " 3.50                 Results from last 7 days   Lab Units 10/07/20  0755 10/06/20  0710 10/05/20  0513 10/04/20  0619 10/03/20  0830 10/02/20  0747 10/01/20  0710   WBC 10*3/mm3 14.07* 16.15* 15.53* 15.08* 19.64* 22.64* 23.36*   HEMOGLOBIN g/dL 11.6* 11.8* 11.2* 11.2* 12.0 12.4 11.3*   HEMATOCRIT % 36.9 36.0 34.3 35.1 39.1 38.8 35.1   PLATELETS 10*3/mm3 173 171 150 136* 162 158 145   MCV fL 95.8 91.8 92.0 96.4 97.5* 94.4 94.1     Results from last 7 days   Lab Units 10/07/20  0755 10/06/20  0710 10/05/20  0513   INR  1.67* 1.62* 1.59*               Invalid input(s): LDLCALC          Glucose   Date/Time Value Ref Range Status   10/07/2020 0624 157 (H) 70 - 130 mg/dL Final   10/06/2020 2051 194 (H) 70 - 130 mg/dL Final   10/06/2020 1617 140 (H) 70 - 130 mg/dL Final   10/06/2020 1113 174 (H) 70 - 130 mg/dL Final   10/06/2020 0625 128 70 - 130 mg/dL Final   10/05/2020 2111 172 (H) 70 - 130 mg/dL Final   10/05/2020 1621 164 (H) 70 - 130 mg/dL Final   10/05/2020 1124 207 (H) 70 - 130 mg/dL Final     Results from last 7 days   Lab Units 10/02/20  0747   PROCALCITONIN ng/mL 0.07     Results from last 7 days   Lab Units 10/06/20  1020 09/30/20  1114   RESPCX  Rare Normal Respiratory Milana: NO S.aureus/MRSA or Pseudomonas aeruginosa Heavy growth (4+) Pseudomonas aeruginosa*  No Normal Respiratory Milana*         Results from last 7 days   Lab Units 10/01/20  1558   ADENOVIRUS DETECTION BY PCR  Not Detected   CORONAVIRUS 229E  Not Detected   CORONAVIRUS HKU1  Not Detected   CORONAVIRUS NL63  Not Detected   CORONAVIRUS OC43  Not Detected   HUMAN METAPNEUMOVIRUS  Not Detected   HUMAN RHINOVIRUS/ENTEROVIRUS  Detected*   INFLUENZA B PCR  Not Detected   PARAINFLUENZA 1  Not Detected   PARAINFLUENZA VIRUS 2  Not Detected   PARAINFLUENZA VIRUS 3  Not Detected   PARAINFLUENZA VIRUS 4  Not Detected   BORDETELLA PERTUSSIS PCR  Not Detected   BORDETELLA PARAPERTUSSIS PCR  Not Detected   [id number]  Not Detected   CHLAMYDOPHILA PNEUMONIAE  PCR  Not Detected   MYCOPLAMA PNEUMO PCR  Not Detected   INFLUENZA A H3  Not Detected   INFLUENZA A H1  Not Detected   RSV, PCR  Not Detected               Imaging:   Imaging Results (All)     Procedure Component Value Units Date/Time     I reviewed the patient's new clinical results.  I personally viewed and interpreted the patient's imaging results:        Medication Review:   aspirin, 81 mg, Oral, Daily  budesonide-formoterol, 2 puff, Inhalation, BID - RT  carvedilol, 3.125 mg, Oral, BID  cefepime, 2 g, Intravenous, Q12H  fluticasone, 1 spray, Nasal, BID  furosemide, 20 mg, Oral, QAM  glipizide, 2.5 mg, Oral, QAM  guaiFENesin, 600 mg, Oral, Q12H  insulin lispro, 0-14 Units, Subcutaneous, TID AC  ipratropium-albuterol, 3 mL, Nebulization, 4x Daily - RT  losartan, 25 mg, Oral, Daily  montelukast, 10 mg, Oral, Nightly  oxazepam, 10 mg, Oral, Nightly  oxybutynin XL, 10 mg, Oral, Nightly  pantoprazole, 40 mg, Oral, Daily  rosuvastatin, 20 mg, Oral, Nightly  sodium chloride, 10 mL, Intravenous, Q12H  sodium chloride, 4 mL, Nebulization, BID  tobramycin, 300 mg, Nebulization, BID - RT  warfarin (COUMADIN) (dosing per levels), , Does not apply, Daily        Pharmacy to dose warfarin,   sodium chloride, 1,000 mL, Last Rate: 1,000 mL (10/06/20 0957)        ASSESSMENT:   1. COPD/asthma with mild exacerbation  2. Right lower lobe pneumonia with heavy growth of Pseudomonas on cultures  3. Chronic bronchitis  4. Acute hypoxemic respiratory failure  5. Elevated left hemidiaphragm: Chronic  6. Ischemic cardiomyopathy  7. Diastolic dysfunction  8. Mitral regurgitation  9. Atrial fibrillation and sick sinus syndrome  10. CLL  11. 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 Hypertension  13. Diabetes  14. CHARLENE on BiPAP  15. Immunodeficiency    PLAN:  Patient was started on cefepime given the abnormal finding on the exam consistent with worsening pneumonia and mucous plugging  Her secretions are becoming thick again and we will attempt  to do nasotracheal suctioning, if no better we might have to do one more bronchoscopy to help with the mucous plug and thick secretion if she continues to fail aggressive medical management  We will continue with inhaled tobramycin in addition to the IV cefepime  Continue with the systemic steroid  Continue with the bronchodilators and the mucolytic's  Oxygen supplementation as needed and titrate to maintain adequate oxygenation at all time    Discussed with the patient and with the      respiratory therapist  Disposition: Home    Juno Coburn MD  10/07/20  10:57 EDT            Dictated utilizing Dragon dictation

## 2020-10-07 NOTE — H&P
"DAILY PROGRESS NOTE  [facility]    Patient Identification:  Name: Caitlyn Longoria  Age: 85 y.o.  Sex: female  :  1934  MRN: [id number]         Primary Care Physician: Zenaida Suarez MD    Subjective:  Interval History:Short of air.  Worse today.  Back pain    Objective:    Scheduled Meds:aspirin, 81 mg, Oral, Daily  budesonide-formoterol, 2 puff, Inhalation, BID - RT  carvedilol, 3.125 mg, Oral, BID  cefepime, 2 g, Intravenous, Q12H  fluticasone, 1 spray, Nasal, BID  furosemide, 20 mg, Oral, QAM  glipizide, 2.5 mg, Oral, QAM  guaiFENesin, 600 mg, Oral, Q12H  insulin lispro, 0-14 Units, Subcutaneous, TID AC  ipratropium-albuterol, 3 mL, Nebulization, 4x Daily - RT  losartan, 25 mg, Oral, Daily  montelukast, 10 mg, Oral, Nightly  oxazepam, 10 mg, Oral, Nightly  oxybutynin XL, 10 mg, Oral, Nightly  pantoprazole, 40 mg, Oral, Daily  rosuvastatin, 20 mg, Oral, Nightly  sodium chloride, 10 mL, Intravenous, Q12H  sodium chloride, 4 mL, Nebulization, BID  tobramycin, 300 mg, Nebulization, BID - RT  warfarin (COUMADIN) (dosing per levels), , Does not apply, Daily  warfarin, 3 mg, Oral, Daily      Continuous Infusions:Pharmacy to dose warfarin,   sodium chloride, 1,000 mL, Last Rate: 1,000 mL (10/06/20 0957)        Vital signs in last 24 hours:  Temp:  [97.5 °F (36.4 °C)-98.6 °F (37 °C)] 98.6 °F (37 °C)  Heart Rate:  [79-93] 79  Resp:  [16-23] 16  BP: (102-122)/(66-72) 111/71    Intake/Output:    Intake/Output Summary (Last 24 hours) at 10/7/2020 1531  Last data filed at 10/7/2020 1300  Gross per 24 hour   Intake 1280 ml   Output 400 ml   Net 880 ml       Exam:  /71 (BP Location: Left arm, Patient Position: Sitting)   Pulse 79   Temp 98.6 °F (37 °C) (Oral)   Resp 16   Ht 157.5 cm (62\")   Wt 95.7 kg (211 lb)   SpO2 97%   BMI 38.59 kg/m²     General Appearance:    Alert, cooperative, no distress   Head:    Normocephalic, without obvious abnormality, atraumatic   Eyes:       "   Throat:   Lips, tongue, gums normal   Neck:   Supple, symmetrical, trachea midline, no JVD   Lungs:     Rhonchi and wheezes. bilaterally, respirations unlabored   Chest Wall:    No tenderness or deformity    Heart:    Regular rate and rhythm, S1 and S2 normal, no murmur,no  Rub or gallop   Abdomen:     Soft, nontender, bowel sounds active, no masses, no organomegaly    Extremities:   Extremities normal, atraumatic, no cyanosis or edema   Pulses:      Skin:   Skin is warm and dry,  no rashes or palpable lesions   Neurologic:   no focal deficits noted      Lab Results (last 72 hours)     Procedure Component Value Units Date/Time    POC Glucose Once [[id number]]  (Abnormal) Collected: 09/30/20 1557    Specimen: Blood Updated: 09/30/20 1559     Glucose 358 mg/dL     POC Glucose Once [[id number]]  (Abnormal) Collected: 09/30/20 1158    Specimen: Blood Updated: 09/30/20 1205     Glucose 366 mg/dL     COVID PRE-OP / PRE-PROCEDURE SCREENING ORDER (NO ISOLATION) - Swab, Nasopharynx [[id number]] Collected: 09/29/20 1819    Specimen: Swab from Nasopharynx Updated: 09/30/20 1135    Narrative:      The following orders were created for panel order COVID PRE-OP / PRE-PROCEDURE SCREENING ORDER (NO ISOLATION) - Swab, Nasopharynx.  Procedure                               Abnormality         Status                     ---------                               -----------         ------                     COVID-19,BIOTAP, NP/OP S...[[id number]]                      Final result                 Please view results for these tests on the individual orders.    COVID-19,BIOTAP, NP/OP SWAB IN TRANSPORT MEDIA OR SALINE 24-36 HR TAT - Swab, Nasopharynx [[id number]] Collected: 09/29/20 1819    Specimen: Swab from Nasopharynx Updated: 09/30/20 1135     SARS-CoV-2 PCR Not Detected     Comment: Nucleic acid specific to SARS-CoV-2 (COVID-19) virus was not detected inthis sample by the TaqPath (TM) COVID-19 Combo Kit.SARS-CoV-2 (COVID-19) nucleic acid  "testing performed using [employer] Aptima (R) SARS-CoV-2 Assay or [employer] TaqPath   (TM)COVID-19 Combo Kit as indicated above under Emergency Use Authorization (EUA) from the FDA. Aptima (R) and TaqPath (TM) test performancecharacteristics verified by [facility] in accordance with the FDAs Guidance Document (Policy for Diagnostic   Tests for Coronavirus Disease-2019during the Public Health Emergency) issued on March 16, 2020. The laboratory is regulated under CLIA as qualified to perform high-complexity testingUnless otherwise noted, all testing was performed at [facility],   CLIA No. [id number], [facility] Licensee No. [id number]       Respiratory Culture - Sputum, Cough [[id number]] Collected: 09/30/20 1114    Specimen: Sputum from Cough Updated: 09/30/20 1123    Procalcitonin [[id number]]  (Normal) Collected: 09/30/20 0702    Specimen: Blood from Hand, Left Updated: 09/30/20 0810     Procalcitonin 0.06 ng/mL     Narrative:      As a Marker for Sepsis (Non-Neonates):   1. <0.5 ng/mL represents a low risk of severe sepsis and/or septic shock.  1. >2 ng/mL represents a high risk of severe sepsis and/or septic shock.    As a Marker for Lower Respiratory Tract Infections that require antibiotic therapy:  PCT on Admission     Antibiotic Therapy             6-12 Hrs later  > 0.5                Strongly Recommended            >0.25 - <0.5         Recommended  0.1 - 0.25           Discouraged                   Remeasure/reassess PCT  <0.1                 Strongly Discouraged          Remeasure/reassess PCT      As 28 day mortality risk marker: \"Change in Procalcitonin Result\" (> 80 % or <=80 %) if Day 0 (or Day 1) and Day 4 values are available. Refer to [email or web address]-pct-[email or web address]/   Change in PCT <=80 %   A decrease of PCT levels below or equal to 80 % defines a positive change in PCT test result representing a higher risk for 28-day all-cause mortality of patients diagnosed with severe sepsis or " "septic shock.  Change in PCT > 80 %   A decrease of PCT levels of more than 80 % defines a negative change in PCT result representing a lower risk for 28-day all-cause mortality of patients diagnosed with severe sepsis or septic shock.                Results may be falsely decreased if patient taking Biotin.     TSH [[id number]]  (Normal) Collected: 09/30/20 0702    Specimen: Blood from Hand, Left Updated: 09/30/20 0805     TSH 0.801 uIU/mL     Basic Metabolic Panel [[id number]]  (Abnormal) Collected: 09/30/20 0702    Specimen: Blood from Hand, Left Updated: 09/30/20 0803     Glucose 235 mg/dL      BUN 16 mg/dL      Creatinine 0.84 mg/dL      Sodium 142 mmol/L      Potassium 4.1 mmol/L      Chloride 108 mmol/L      CO2 21.9 mmol/L      Calcium 8.3 mg/dL      eGFR Non African Amer 64 mL/min/1.73      BUN/Creatinine Ratio 19.0     Anion Gap 12.1 mmol/L     Narrative:      GFR Normal >60  Chronic Kidney Disease <60  Kidney Failure <15      Protime-INR [[id number]]  (Abnormal) Collected: 09/30/20 0702    Specimen: Blood from Hand, Left Updated: 09/30/20 0741     Protime 35.9 Seconds      INR 3.78    Procalcitonin [[id number]]  (Normal) Collected: 09/29/20 2004    Specimen: Blood Updated: 09/30/20 0024     Procalcitonin 0.05 ng/mL     Narrative:      As a Marker for Sepsis (Non-Neonates):   1. <0.5 ng/mL represents a low risk of severe sepsis and/or septic shock.  1. >2 ng/mL represents a high risk of severe sepsis and/or septic shock.    As a Marker for Lower Respiratory Tract Infections that require antibiotic therapy:  PCT on Admission     Antibiotic Therapy             6-12 Hrs later  > 0.5                Strongly Recommended            >0.25 - <0.5         Recommended  0.1 - 0.25           Discouraged                   Remeasure/reassess PCT  <0.1                 Strongly Discouraged          Remeasure/reassess PCT      As 28 day mortality risk marker: \"Change in Procalcitonin Result\" (> 80 % or <=80 %) if Day 0 (or Day " 1) and Day 4 values are available. Refer to http://[email or web address]-pct-[email or web address]/   Change in PCT <=80 %   A decrease of PCT levels below or equal to 80 % defines a positive change in PCT test result representing a higher risk for 28-day all-cause mortality of patients diagnosed with severe sepsis or septic shock.  Change in PCT > 80 %   A decrease of PCT levels of more than 80 % defines a negative change in PCT result representing a lower risk for 28-day all-cause mortality of patients diagnosed with severe sepsis or septic shock.                Results may be falsely decreased if patient taking Biotin.     POC Glucose Once [[id number]]  (Normal) Collected: 09/30/20 0006    Specimen: Blood Updated: 09/30/20 0007     Glucose 118 mg/dL     Troponin [[id number]]  (Normal) Collected: 09/29/20 2004    Specimen: Blood Updated: 09/29/20 2041     Troponin T <0.010 ng/mL     Narrative:      Troponin T Reference Range:  <= 0.03 ng/mL-   Negative for AMI  >0.03 ng/mL-     Abnormal for myocardial necrosis.  Clinicians would have to utilize clinical acumen, EKG, Troponin and serial changes to determine if it is an Acute Myocardial Infarction or myocardial injury due to an underlying chronic condition.       Results may be falsely decreased if patient taking Biotin.      Blood Culture - Blood, Arm, Left [[id number]] Collected: 09/29/20 2004    Specimen: Blood from Arm, Left Updated: 09/29/20 2012    CBC & Differential [[id number]]  (Abnormal) Collected: 09/29/20 1755    Specimen: Blood Updated: 09/29/20 1928    Narrative:      The following orders were created for panel order CBC & Differential.  Procedure                               Abnormality         Status                     ---------                               -----------         ------                     CBC Auto Differential[[id number]]        Abnormal            Final result                 Please view results for these tests on the individual orders.    CBC Auto  Differential [[id number]]  (Abnormal) Collected: 09/29/20 1755    Specimen: Blood Updated: 09/29/20 1928     WBC 24.44 10*3/mm3      RBC 4.22 10*6/mm3      Hemoglobin 12.7 g/dL      Hematocrit 39.2 %      MCV 92.9 fL      MCH 30.1 pg      MCHC 32.4 g/dL      RDW 12.7 %      RDW-SD 42.6 fl      MPV 10.0 fL      Platelets 158 10*3/mm3     Manual Differential [[id number]]  (Abnormal) Collected: 09/29/20 1755    Specimen: Blood Updated: 09/29/20 1928     Neutrophil % 28.0 %      Lymphocyte % 70.0 %      Monocyte % 1.0 %      Basophil % 1.0 %      Neutrophils Absolute 6.84 10*3/mm3      Lymphocytes Absolute 17.11 10*3/mm3      Monocytes Absolute 0.24 10*3/mm3      Basophils Absolute 0.24 10*3/mm3      RBC Morphology Normal     Smudge Cells Mod/2+     Platelet Morphology Normal    BNP [[id number]]  (Normal) Collected: 09/29/20 1755    Specimen: Blood Updated: 09/29/20 1847     proBNP 535.5 pg/mL     Narrative:      Among patients with dyspnea, NT-proBNP is highly sensitive for the detection of acute congestive heart failure. In addition NT-proBNP of <300 pg/ml effectively rules out acute congestive heart failure with 99% negative predictive value.    Results may be falsely decreased if patient taking Biotin.      Troponin [[id number]]  (Normal) Collected: 09/29/20 1755    Specimen: Blood Updated: 09/29/20 1847     Troponin T <0.010 ng/mL     Narrative:      Troponin T Reference Range:  <= 0.03 ng/mL-   Negative for AMI  >0.03 ng/mL-     Abnormal for myocardial necrosis.  Clinicians would have to utilize clinical acumen, EKG, Troponin and serial changes to determine if it is an Acute Myocardial Infarction or myocardial injury due to an underlying chronic condition.       Results may be falsely decreased if patient taking Biotin.      Comprehensive Metabolic Panel [[id number]]  (Abnormal) Collected: 09/29/20 1755    Specimen: Blood Updated: 09/29/20 1843     Glucose 131 mg/dL      BUN 16 mg/dL      Creatinine 1.00 mg/dL       Sodium 141 mmol/L      Potassium 3.2 mmol/L      Chloride 105 mmol/L      CO2 22.9 mmol/L      Calcium 9.1 mg/dL      Total Protein 6.4 g/dL      Albumin 3.90 g/dL      ALT (SGPT) 23 U/L      AST (SGOT) 17 U/L      Alkaline Phosphatase 105 U/L      Total Bilirubin 0.8 mg/dL      eGFR Non African Amer 53 mL/min/1.73      Globulin 2.5 gm/dL      A/G Ratio 1.6 g/dL      BUN/Creatinine Ratio 16.0     Anion Gap 13.1 mmol/L     Narrative:      GFR Normal >60  Chronic Kidney Disease <60  Kidney Failure <15      Lactic Acid, Plasma [[id number]]  (Normal) Collected: 09/29/20 1755    Specimen: Blood Updated: 09/29/20 1831     Lactate 1.0 mmol/L     D-dimer, Quantitative [[id number]]  (Abnormal) Collected: 09/29/20 1755    Specimen: Blood Updated: 09/29/20 1829     D-Dimer, Quantitative 0.66 MCGFEU/mL     Narrative:      The [employer] D-Dimer test used in conjunction with a clinical pretest probability (PTP) assessment model, has been approved by the FDA to rule out the presence of venous thromboembolism (VTE) in outpatients suspected of deep venous thrombosis (DVT) or pulmonary embolism (PE). The cut-off for negative predictive value is <0.50 MCGFEU/mL.    Protime-INR [[id number]]  (Abnormal) Collected: 09/29/20 1755    Specimen: Blood Updated: 09/29/20 1829     Protime 33.2 Seconds      INR 3.41    Blood Culture - Blood, Arm, Left [[id number]] Collected: 09/29/20 1755    Specimen: Blood from Arm, Left Updated: 09/29/20 1812    Blood Gas, Venous [[id number]]  (Abnormal) Collected: 09/29/20 1801    Specimen: Venous Blood Updated: 09/29/20 1804     Site RV     pH, Venous 7.431 pH Units      pCO2, Venous 34.2 mm Hg      pO2, Venous 40.6 mm Hg      HCO3, Venous 22.7 mmol/L      Base Excess, Venous -1.1 mmol/L      O2 Saturation Calculated 77.8 %      Barometric Pressure for Blood Gas 744.9 mmHg      Modality Room Air     Rate 26 Breaths/minute         Data Review:  Results from last 7 days   Lab Units 10/07/20  0755 10/06/20  0710  10/05/20  0513   SODIUM mmol/L 135* 139 136   POTASSIUM mmol/L 4.0 3.9 3.5   CHLORIDE mmol/L 97* 99 99   CO2 mmol/L 30.9* 30.8* 26.5   BUN mg/dL 17 16 18   CREATININE mg/dL 0.83 0.78 0.85   GLUCOSE mg/dL 147* 150* 157*   CALCIUM mg/dL 8.2* 8.4* 8.5*     Results from last 7 days   Lab Units 10/07/20  0755 10/06/20  0710 10/05/20  0513   WBC 10*3/mm3 14.07* 16.15* 15.53*   HEMOGLOBIN g/dL 11.6* 11.8* 11.2*   HEMATOCRIT % 36.9 36.0 34.3   PLATELETS 10*3/mm3 173 171 150             Lab Results   Lab Value Date/Time    TROPONINT <0.010 09/29/2020 2004    TROPONINT <0.010 09/29/2020 1755    TROPONINT <0.010 08/29/2020 1036    TROPONINT <0.010 04/28/2020 1421    TROPONINT <0.010 04/23/2020 2143    TROPONINT 0.014 02/11/2020 1748    TROPONINT <0.010 09/16/2019 1207    TROPONINT 0.12 (C) 03/27/2014 0955    TROPONINT 0.14 (C) 03/27/2014 0355    TROPONINT 0.14 (C) 03/26/2014 1755    TROPONINT 0.15 (C) 03/26/2014 0349         Results from last 7 days   Lab Units 10/02/20  0747 10/01/20  0710   ALK PHOS U/L 106 91   BILIRUBIN mg/dL 0.5 0.4   ALT (SGPT) U/L 25 19   AST (SGOT) U/L 22 16             Glucose   Date/Time Value Ref Range Status   10/07/2020 1200 127 70 - 130 mg/dL Final   10/07/2020 0624 157 (H) 70 - 130 mg/dL Final   10/06/2020 2051 194 (H) 70 - 130 mg/dL Final   10/06/2020 1617 140 (H) 70 - 130 mg/dL Final   10/06/2020 1113 174 (H) 70 - 130 mg/dL Final   10/06/2020 0625 128 70 - 130 mg/dL Final   10/05/2020 2111 172 (H) 70 - 130 mg/dL Final   10/05/2020 1621 164 (H) 70 - 130 mg/dL Final     Results from last 7 days   Lab Units 10/07/20  0755 10/06/20  0710 10/05/20  0513   INR  1.67* 1.62* 1.59*       Past Medical History:   Diagnosis Date   • Aortic calcification (CMS/HCC)     mild, on echo 12/17/2017   • Aortic regurgitation     Trace   • Asthma    • Atrial fibrillation (CMS/HCC)    • CAD (coronary artery disease)    • Chronic combined systolic and diastolic congestive heart failure (CMS/HCC)    • CKD (chronic  kidney disease) stage 3, GFR 30-59 ml/min    • Coronary artery disease involving native coronary artery of native heart with angina pectoris (CMS/HCC)    • Disc degeneration, lumbar    • DM type 2 (diabetes mellitus, type 2) (CMS/[facility])    • GERD (gastroesophageal reflux disease)    • History of aneurysm     right femoral artery s/p LHC   • History of blood transfusion    • History of fracture    • History of vitamin D deficiency    • Hyperlipidemia    • Hypertension    • Mild mitral regurgitation    • Mitral annular calcification     12/8/2017- echo, moderate   • PAF (paroxysmal atrial fibrillation) (CMS/[facility])    • Peripheral neuropathy    • Skin cancer     Left hand   • Sleep apnea    • SSS (sick sinus syndrome) (CMS/[facility])    • Stroke (cerebrum) (CMS/[facility])    • Tricuspid regurgitation     Trace       Assessment:  Active Hospital Problems    Diagnosis  POA   • **Chronic bronchitis (CMS/[facility]) [J42]  Yes   • Pneumonia due to gram-negative bacteria (CMS/[facility]) [J15.6]  Unknown   • Closed wedge compression fracture of T5 vertebra (CMS/[facility]) [S22.050A]  Unknown   • Dyspnea [R06.00]  Yes   • Anticoagulated on Coumadin [Z79.01]  Not Applicable   • CLL (chronic lymphoid leukemia) in relapse (CMS/[facility]) [C91.92]  Yes   • Rhinovirus [B34.8]  Yes   • Morbid obesity (CMS/[facility]) [E66.01]  Yes   • HTN (hypertension) [I10]  Yes   • CKD (chronic kidney disease), stage III [N18.30]  Yes   • DM II (diabetes mellitus, type II), controlled (CMS/[facility]) [E11.9]  Yes   • Paroxysmal atrial fibrillation (CMS/[facility]) [I48.0]  Yes      Resolved Hospital Problems   No resolved problems to display.       Plan:  Continue the nebs and O 2, await pulmonary. Follow lab.  Antibiotics for PNA. Bronch report noted.  Back brace.  Get CXR and AB.    Saman Cruz MD  10/7/2020  15:31 EDT

## 2020-10-08 ENCOUNTER — APPOINTMENT (OUTPATIENT)
Dept: GENERAL RADIOLOGY | Facility: HOSPITAL | Age: 85
End: 2020-10-08

## 2020-10-08 LAB
ANION GAP SERPL CALCULATED.3IONS-SCNC: 11.6 MMOL/L (ref 5–15)
BACTERIA SPEC RESP CULT: NORMAL
BUN SERPL-MCNC: 21 MG/DL (ref 8–23)
BUN/CREAT SERPL: 24.7 (ref 7–25)
CALCIUM SPEC-SCNC: 8.5 MG/DL (ref 8.6–10.5)
CHLORIDE SERPL-SCNC: 97 MMOL/L (ref 98–107)
CO2 SERPL-SCNC: 28.4 MMOL/L (ref 22–29)
CREAT SERPL-MCNC: 0.85 MG/DL (ref 0.57–1)
CYTO UR: NORMAL
DEPRECATED RDW RBC AUTO: 42.2 FL (ref 37–54)
ERYTHROCYTE [DISTWIDTH] IN BLOOD BY AUTOMATED COUNT: 12.6 % (ref 12.3–15.4)
GFR SERPL CREATININE-BSD FRML MDRD: 64 ML/MIN/1.73
GLUCOSE BLDC GLUCOMTR-MCNC: 153 MG/DL (ref 70–130)
GLUCOSE BLDC GLUCOMTR-MCNC: 191 MG/DL (ref 70–130)
GLUCOSE BLDC GLUCOMTR-MCNC: 199 MG/DL (ref 70–130)
GLUCOSE BLDC GLUCOMTR-MCNC: 264 MG/DL (ref 70–130)
GLUCOSE BLDC GLUCOMTR-MCNC: 291 MG/DL (ref 70–130)
GLUCOSE SERPL-MCNC: 279 MG/DL (ref 65–99)
GRAM STN SPEC: NORMAL
HCT VFR BLD AUTO: 36 % (ref 34–46.6)
HGB BLD-MCNC: 11.7 G/DL (ref 12–15.9)
INR PPP: 1.94 (ref 0.9–1.1)
LAB AP CASE REPORT: NORMAL
LAB AP DIAGNOSIS COMMENT: NORMAL
LYMPHOCYTES # BLD MANUAL: 10.43 10*3/MM3 (ref 0.7–3.1)
LYMPHOCYTES NFR BLD MANUAL: 63 % (ref 19.6–45.3)
MCH RBC QN AUTO: 30.4 PG (ref 26.6–33)
MCHC RBC AUTO-ENTMCNC: 32.5 G/DL (ref 31.5–35.7)
MCV RBC AUTO: 93.5 FL (ref 79–97)
NEUTROPHILS # BLD AUTO: 6.12 10*3/MM3 (ref 1.7–7)
NEUTROPHILS NFR BLD MANUAL: 37 % (ref 42.7–76)
PATH REPORT.FINAL DX SPEC: NORMAL
PATH REPORT.GROSS SPEC: NORMAL
PLAT MORPH BLD: NORMAL
PLATELET # BLD AUTO: 189 10*3/MM3 (ref 140–450)
PMV BLD AUTO: 9.8 FL (ref 6–12)
POTASSIUM SERPL-SCNC: 4.2 MMOL/L (ref 3.5–5.2)
PROTHROMBIN TIME: 21.7 SECONDS (ref 11.7–14.2)
RBC # BLD AUTO: 3.85 10*6/MM3 (ref 3.77–5.28)
RBC MORPH BLD: NORMAL
SMUDGE CELLS BLD QL SMEAR: ABNORMAL
SODIUM SERPL-SCNC: 137 MMOL/L (ref 136–145)
WBC # BLD AUTO: 16.55 10*3/MM3 (ref 3.4–10.8)

## 2020-10-08 PROCEDURE — 63710000001 INSULIN LISPRO (HUMAN) PER 5 UNITS: Performed by: HOSPITALIST

## 2020-10-08 PROCEDURE — 63710000001 TOBRAMYCIN PER 300 MG: Performed by: INTERNAL MEDICINE

## 2020-10-08 PROCEDURE — 97110 THERAPEUTIC EXERCISES: CPT

## 2020-10-08 PROCEDURE — 80048 BASIC METABOLIC PNL TOTAL CA: CPT | Performed by: HOSPITALIST

## 2020-10-08 PROCEDURE — 71046 X-RAY EXAM CHEST 2 VIEWS: CPT

## 2020-10-08 PROCEDURE — 25010000002 CEFEPIME PER 500 MG: Performed by: INTERNAL MEDICINE

## 2020-10-08 PROCEDURE — 94799 UNLISTED PULMONARY SVC/PX: CPT

## 2020-10-08 PROCEDURE — 85025 COMPLETE CBC W/AUTO DIFF WBC: CPT | Performed by: HOSPITALIST

## 2020-10-08 PROCEDURE — 85007 BL SMEAR W/DIFF WBC COUNT: CPT | Performed by: HOSPITALIST

## 2020-10-08 PROCEDURE — 85610 PROTHROMBIN TIME: CPT | Performed by: HOSPITALIST

## 2020-10-08 PROCEDURE — 99231 SBSQ HOSP IP/OBS SF/LOW 25: CPT | Performed by: NURSE PRACTITIONER

## 2020-10-08 PROCEDURE — 82962 GLUCOSE BLOOD TEST: CPT

## 2020-10-08 RX ORDER — SODIUM CHLORIDE FOR INHALATION 7 %
4 VIAL, NEBULIZER (ML) INHALATION 3 TIMES DAILY
Status: DISCONTINUED | OUTPATIENT
Start: 2020-10-08 | End: 2020-10-09

## 2020-10-08 RX ADMIN — CARVEDILOL 3.12 MG: 3.12 TABLET, FILM COATED ORAL at 09:38

## 2020-10-08 RX ADMIN — MONTELUKAST SODIUM 10 MG: 10 TABLET, FILM COATED ORAL at 20:09

## 2020-10-08 RX ADMIN — HYDROCODONE BITARTRATE AND ACETAMINOPHEN 1 TABLET: 5; 325 TABLET ORAL at 18:58

## 2020-10-08 RX ADMIN — IPRATROPIUM BROMIDE AND ALBUTEROL SULFATE 3 ML: 2.5; .5 SOLUTION RESPIRATORY (INHALATION) at 12:00

## 2020-10-08 RX ADMIN — ROSUVASTATIN CALCIUM 20 MG: 20 TABLET, FILM COATED ORAL at 20:08

## 2020-10-08 RX ADMIN — INSULIN LISPRO 8 UNITS: 100 INJECTION, SOLUTION INTRAVENOUS; SUBCUTANEOUS at 09:38

## 2020-10-08 RX ADMIN — GUAIFENESIN 600 MG: 600 TABLET, EXTENDED RELEASE ORAL at 09:38

## 2020-10-08 RX ADMIN — GLIPIZIDE 2.5 MG: 5 TABLET ORAL at 06:26

## 2020-10-08 RX ADMIN — GUAIFENESIN 600 MG: 600 TABLET, EXTENDED RELEASE ORAL at 20:08

## 2020-10-08 RX ADMIN — SODIUM CHLORIDE 4 ML: 7 NEBU SOLN,3 % NEBU at 20:27

## 2020-10-08 RX ADMIN — WARFARIN 3 MG: 3 TABLET ORAL at 17:24

## 2020-10-08 RX ADMIN — TOBRAMYCIN 300 MG: 1.2 INJECTION, POWDER, LYOPHILIZED, FOR SOLUTION INTRAVENOUS at 12:10

## 2020-10-08 RX ADMIN — SODIUM CHLORIDE 4 ML: 7 NEBU SOLN,3 % NEBU at 16:56

## 2020-10-08 RX ADMIN — INSULIN LISPRO 3 UNITS: 100 INJECTION, SOLUTION INTRAVENOUS; SUBCUTANEOUS at 17:24

## 2020-10-08 RX ADMIN — ASPIRIN 81 MG: 81 TABLET, COATED ORAL at 09:38

## 2020-10-08 RX ADMIN — IPRATROPIUM BROMIDE AND ALBUTEROL SULFATE 3 ML: 2.5; .5 SOLUTION RESPIRATORY (INHALATION) at 16:44

## 2020-10-08 RX ADMIN — LOSARTAN POTASSIUM 25 MG: 25 TABLET, FILM COATED ORAL at 09:38

## 2020-10-08 RX ADMIN — CARVEDILOL 3.12 MG: 3.12 TABLET, FILM COATED ORAL at 20:09

## 2020-10-08 RX ADMIN — HYDROCODONE BITARTRATE AND ACETAMINOPHEN 1 TABLET: 5; 325 TABLET ORAL at 06:26

## 2020-10-08 RX ADMIN — INSULIN LISPRO 3 UNITS: 100 INJECTION, SOLUTION INTRAVENOUS; SUBCUTANEOUS at 12:40

## 2020-10-08 RX ADMIN — CEFEPIME HYDROCHLORIDE 2 G: 2 INJECTION, POWDER, FOR SOLUTION INTRAVENOUS at 13:59

## 2020-10-08 RX ADMIN — FUROSEMIDE 20 MG: 20 TABLET ORAL at 06:26

## 2020-10-08 RX ADMIN — PANTOPRAZOLE SODIUM 40 MG: 40 TABLET, DELAYED RELEASE ORAL at 09:38

## 2020-10-08 RX ADMIN — HYDROCODONE BITARTRATE AND ACETAMINOPHEN 1 TABLET: 5; 325 TABLET ORAL at 00:28

## 2020-10-08 RX ADMIN — HYDROCODONE BITARTRATE AND ACETAMINOPHEN 1 TABLET: 5; 325 TABLET ORAL at 12:40

## 2020-10-08 RX ADMIN — IPRATROPIUM BROMIDE AND ALBUTEROL SULFATE 3 ML: 2.5; .5 SOLUTION RESPIRATORY (INHALATION) at 20:27

## 2020-10-08 RX ADMIN — OXYBUTYNIN CHLORIDE 10 MG: 10 TABLET, EXTENDED RELEASE ORAL at 20:08

## 2020-10-08 RX ADMIN — ACETAMINOPHEN 650 MG: 325 TABLET, FILM COATED ORAL at 17:24

## 2020-10-08 RX ADMIN — CEFEPIME HYDROCHLORIDE 2 G: 2 INJECTION, POWDER, FOR SOLUTION INTRAVENOUS at 02:53

## 2020-10-08 RX ADMIN — FLUTICASONE PROPIONATE 1 SPRAY: 50 SPRAY, METERED NASAL at 20:09

## 2020-10-08 RX ADMIN — OXAZEPAM 10 MG: 10 CAPSULE, GELATIN COATED ORAL at 20:08

## 2020-10-08 RX ADMIN — TOBRAMYCIN 300 MG: 1.2 INJECTION, POWDER, LYOPHILIZED, FOR SOLUTION INTRAVENOUS at 20:28

## 2020-10-08 RX ADMIN — Medication 4 ML: at 12:01

## 2020-10-08 NOTE — PLAN OF CARE
Problem: Adult Inpatient Plan of Care  Goal: Plan of Care Review  Outcome: Ongoing, Progressing  Goal: Patient-Specific Goal (Individualized)  Outcome: Ongoing, Progressing  Goal: Absence of Hospital-Acquired Illness or Injury  Outcome: Ongoing, Progressing  Intervention: Identify and Manage Fall Risk  Recent Flowsheet Documentation  Taken 10/7/2020 2000 by Malgorzata Rivera RN  Safety Promotion/Fall Prevention:   activity supervised   fall prevention program maintained   nonskid shoes/slippers when out of bed  Goal: Optimal Comfort and Wellbeing  Outcome: Ongoing, Progressing  Intervention: Provide Person-Centered Care  Recent Flowsheet Documentation  Taken 10/7/2020 2000 by Malgorzata Rivera RN  Trust Relationship/Rapport:   care explained   choices provided  Goal: Readiness for Transition of Care  Outcome: Ongoing, Progressing     Problem: Fall Injury Risk  Goal: Absence of Fall and Fall-Related Injury  Outcome: Ongoing, Progressing  Intervention: Promote Injury-Free Environment  Recent Flowsheet Documentation  Taken 10/7/2020 2000 by Malgorzata Rivera RN  Safety Promotion/Fall Prevention:   activity supervised   fall prevention program maintained   nonskid shoes/slippers when out of bed     Problem: Adjustment to Illness COPD (Chronic Obstructive Pulmonary Disease)  Goal: Optimal Chronic Illness Coping  Outcome: Ongoing, Progressing     Problem: Functional Ability Impaired COPD (Chronic Obstructive Pulmonary Disease)  Goal: Optimal Level of Functional Harmon  Outcome: Ongoing, Progressing     Problem: Infection COPD (Chronic Obstructive Pulmonary Disease)  Goal: Absence of Infection Signs and Symptoms  Outcome: Ongoing, Progressing  Intervention: Prevent or Manage Infection  Recent Flowsheet Documentation  Taken 10/7/2020 2000 by Malgorzata Rivera, RN  Isolation Precautions: droplet precautions maintained     Problem: Oral Intake Inadequate COPD (Chronic Obstructive Pulmonary Disease)  Goal: Improved Nutrition  Intake  Outcome: Ongoing, Progressing     Problem: Respiratory Compromise COPD (Chronic Obstructive Pulmonary Disease)  Goal: Effective Oxygenation and Ventilation  Outcome: Ongoing, Progressing   Goal Outcome Evaluation:  Plan of Care Reviewed With: patient  Progress: improving

## 2020-10-08 NOTE — PROGRESS NOTES
[facility] [place] NEUROSURGERY PROGRESS NOTE      CC: T5 VCF-She states that she had no trauma but that the back pain began after multiple hard coughing spells      Subjective     Interval History: Nurse called requesting us to follow-up as patient had multiple questions about brace and options.  She states that she has ongoing mid back pain that seems to be worse when she is wearing the brace.  She gets a sharp stabbing pain.  At times the brace seems to make her more comfortable.  She is taking pain medication with some relief.  She continues to have significant respiratory issues with potential for repeat bronchoscopy tomorrow.    ROS:  Resp: SOA  MS: back pain    Objective     Vital signs in last 24 hours:       Intake/Output this shift:  No intake/output data recorded.    IMAGING STUDIES:  No new imaging    I personally viewed and interpreted the patient's chart.    Meds reviewed/changed: Yes  No current facility-administered medications for this encounter.     Current Outpatient Medications:   •  albuterol (PROVENTIL) (2.5 MG/3ML) 0.083% nebulizer solution, Take 2.5 mg by nebulization Every 4 (Four) Hours., Disp: , Rfl:   •  albuterol sulfate  (90 Base) MCG/ACT inhaler, Inhale 2 puffs Every 4 (Four) Hours As Needed for Wheezing., Disp: 1 inhaler, Rfl: 3  •  aspirin 81 MG tablet, Take 81 mg by mouth Daily., Disp: , Rfl:   •  Benralizumab (FASENRA SC), Inject  under the skin into the appropriate area as directed. Gets one shot a month, next shot may 13 then one every 8 weeks, Disp: , Rfl:   •  Calcium Carbonate-Vitamin D (CALCIUM 500 + D PO), Take  by mouth 2 (two) times a day., Disp: , Rfl:   •  carvedilol (COREG) 3.125 MG tablet, TAKE ONE TABLET BY MOUTH TWICE A DAY, Disp: 180 tablet, Rfl: 1  •  cephalexin (KEFLEX) 500 MG capsule, Take 500 mg by mouth 3 (Three) Times a Day. 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 She had RX filled at [facility] on 9/29/20 for #270 with refills., Disp: , Rfl:   •  fluticasone  (FLONASE) 50 MCG/ACT nasal spray, 1 spray into the nostril(s) as directed by provider Daily., Disp: , Rfl:   •  Fluticasone-Umeclidin-Vilant (Trelegy Ellipta) 100-62.5-25 MCG/INH aerosol powder , Inhale 1 puff Daily. As directed , Disp: , Rfl:   •  furosemide (LASIX) 20 MG tablet, Take 20 mg by mouth Every Morning., Disp: , Rfl:   •  glipiZIDE (GLUCOTROL) 5 MG tablet, Take 2.5 mg by mouth Every Morning., Disp: , Rfl:   •  losartan (COZAAR) 25 MG tablet, Take 1 tablet by mouth Daily., Disp: 30 tablet, Rfl: 11  •  montelukast (SINGULAIR) 10 MG tablet, Take 10 mg by mouth Every Night., Disp: , Rfl:   •  Omega-3 Fatty Acids (FISH OIL) 1000 MG capsule capsule, Take 1,000 mg by mouth 2 (Two) Times a Day With Meals., Disp: , Rfl:   •  oxazepam (SERAX) 10 MG capsule, Take 1 capsule by mouth Every Night., Disp: 5 capsule, Rfl: 0  •  rosuvastatin (CRESTOR) 20 MG tablet, Take 20 mg by mouth Every Night., Disp: , Rfl:   •  acetaminophen (TYLENOL) 325 MG tablet, Take 2 tablets by mouth Every 4 (Four) Hours As Needed for Mild Pain ., Disp:  , Rfl:   •  acetylcysteine (MUCOMYST) 20 % nebulizer solution, Take 2 mL by nebulization 4 (Four) Times a Day., Disp:  , Rfl:   •  guaiFENesin (MUCINEX) 600 MG 12 hr tablet, Take 1 tablet by mouth Every 12 (Twelve) Hours., Disp:  , Rfl:   •  oxybutynin XL (DITROPAN-XL) 10 MG 24 hr tablet, Take 10 mg by mouth every night at bedtime., Disp: , Rfl:   •  pantoprazole (PROTONIX) 40 MG EC tablet, Take 40 mg by mouth Daily., Disp: , Rfl:   •  polyethyl glycol-propyl glycol (LUBRICATING EYE DROPS) 0.4-0.3 % solution ophthalmic solution, Administer 1 drop to both eyes Every 1 (One) Hour As Needed., Disp: , Rfl:   •  sodium chloride 0.65 % nasal spray, 1 spray into the nostril(s) as directed by provider As Needed for Congestion., Disp:  , Rfl: 12  •  tobramycin PF (Dick) 300 MG/5ML nebulizer solution, Take 5 mL by nebulization 2 (Two) Times a Day for 30 days. Indications: Pneumonia, Disp: 300 mL, Rfl:  0  •  warfarin (COUMADIN) 3 MG tablet, Take 1 tablet by mouth Every Night. Indications: Atrial Fibrillation, Disp: , Rfl:       Physical Exam:    General:   Awake, alert, working with physical therapy.  Short of air with ambulation; generally very chronically ill-appearing female  Respiratory:   Wearing O2 per nasal cannula.  Tachypneic.  Back:    Tenderness upper thoracic midline     Motor: Moving all extremities  Reflexes: 2+ in the upper and lower extremities. Plantar responses are flexor. No You, no clonus  Station and Gait:             Slow gait with forward flexed, kyphotic posture.  Stable with rolling walker and contact-guard assist x1  Extremities:   Multiple scattered bruises      Assessment/Plan     ASSESSMENT:      Chronic bronchitis (CMS/HCC)    DM II (diabetes mellitus, type II), controlled (CMS/HCC)    Paroxysmal atrial fibrillation (CMS/HCC)    HTN (hypertension)    CKD (chronic kidney disease), stage III    Morbid obesity (CMS/HCC)    Rhinovirus    CLL (chronic lymphoid leukemia) in relapse (CMS/HCC)    Dyspnea    Anticoagulated on Coumadin    Osteoporotic compression fracture of spine (CMS/HCC)    Pneumonia due to gram-negative bacteria (CMS/[facility])    Pneumonia due to infectious organism      PLAN: Saw patient in follow-up at nursing request due to multiple questions regarding brace.  She states the brace seems to give her more discomfort than benefit at times.  Advised the patient she has a T5 fracture which is hard to consider kyphoplasty for.  More importantly, she is significantly respiratory compromised and is not a good surgical candidate.  She and I agreed at our first visit that surgery would not be a good option for her and she continues to feel that way today as well.  However, she is unhappy with the brace.  Advised her there is no other brace that would be a better option, but if it is bothering her too much, she does not have to wear it.  Sometimes when patients are hyperkyphotic,  "the brace may give the more discomfort as it attempts to correct to their posture.  Advised pain relief with medication managed by the primary service here or her PCP at home.  She may benefit from an outpatient pain management appointment, but the fracture should heal in time, typically between 6 and 12 weeks.  She is about 1 month into the onset of her pain so hopefully she will begin to heal and get some relief soon.  Again, patient does not have to wear the brace if it is uncomfortable for her.  We will sign off and see her PRN.    I discussed the patient's findings and my recommendations with patient, family and nursing staff       LOS: 15 days       Latonya Thompson, APRN  10/25/2020  12:14 EDT    \"Dictated utilizing Dragon dictation\".      "

## 2020-10-08 NOTE — PLAN OF CARE
A&O, seems anxious today regarding plan of care. She had many concerns that were addressed multiple times throughout the day. Neurosurgery called and asked to come see the patient again regarding questions about back brace. 3L NC currently, unable to wean to pt's baseline of 2L. NPO after midnight for possible bronch. PRN norco for back pain. CTM

## 2020-10-08 NOTE — PROGRESS NOTES
"DAILY PROGRESS NOTE  [facility]    Patient Identification:  Name: Caitlyn Longoria  Age: 85 y.o.  Sex: female  :  1934  MRN: [id number]         Primary Care Physician: Zenaida Suarez MD    Subjective:  Interval History:Short of air.  Worse today.  Back pain    Objective:    Scheduled Meds:aspirin, 81 mg, Oral, Daily  budesonide-formoterol, 2 puff, Inhalation, BID - RT  carvedilol, 3.125 mg, Oral, BID  cefepime, 2 g, Intravenous, Q12H  fluticasone, 1 spray, Nasal, BID  furosemide, 20 mg, Oral, QAM  glipizide, 2.5 mg, Oral, QAM  guaiFENesin, 600 mg, Oral, Q12H  insulin lispro, 0-14 Units, Subcutaneous, TID AC  ipratropium-albuterol, 3 mL, Nebulization, 4x Daily - RT  losartan, 25 mg, Oral, Daily  montelukast, 10 mg, Oral, Nightly  oxazepam, 10 mg, Oral, Nightly  oxybutynin XL, 10 mg, Oral, Nightly  pantoprazole, 40 mg, Oral, Daily  rosuvastatin, 20 mg, Oral, Nightly  sodium chloride, 4 mL, Nebulization, TID  tobramycin, 300 mg, Nebulization, BID - RT  warfarin (COUMADIN) (dosing per levels), , Does not apply, Daily  warfarin, 3 mg, Oral, Daily      Continuous Infusions:Pharmacy to dose warfarin,         Vital signs in last 24 hours:  Temp:  [97.5 °F (36.4 °C)-98.2 °F (36.8 °C)] 98.2 °F (36.8 °C)  Heart Rate:  [79-83] 79  Resp:  [20-24] 20  BP: (110-140)/(68-82) 110/68    Intake/Output:    Intake/Output Summary (Last 24 hours) at 10/8/2020 1628  Last data filed at 10/8/2020 0900  Gross per 24 hour   Intake 720 ml   Output --   Net 720 ml       Exam:  /68 (BP Location: Left arm, Patient Position: Sitting)   Pulse 79   Temp 98.2 °F (36.8 °C) (Oral)   Resp 20   Ht 157.5 cm (62\")   Wt 95.7 kg (211 lb)   SpO2 90%   BMI 38.59 kg/m²     General Appearance:    Alert, cooperative, no distress   Head:    Normocephalic, without obvious abnormality, atraumatic   Eyes:       Throat:   Lips, tongue, gums normal   Neck:   Supple, symmetrical, trachea midline, no JVD   Lungs:     " Rhonchi and wheezes. bilaterally, respirations unlabored   Chest Wall:    No tenderness or deformity    Heart:    Regular rate and rhythm, S1 and S2 normal, no murmur,no  Rub or gallop   Abdomen:     Soft, nontender, bowel sounds active, no masses, no organomegaly    Extremities:   Extremities normal, atraumatic, no cyanosis or edema   Pulses:      Skin:   Skin is warm and dry,  no rashes or palpable lesions   Neurologic:   no focal deficits noted      Lab Results (last 72 hours)     Procedure Component Value Units Date/Time    POC Glucose Once [[id number]]  (Abnormal) Collected: 09/30/20 1557    Specimen: Blood Updated: 09/30/20 1559     Glucose 358 mg/dL     POC Glucose Once [[id number]]  (Abnormal) Collected: 09/30/20 1158    Specimen: Blood Updated: 09/30/20 1205     Glucose 366 mg/dL     COVID PRE-OP / PRE-PROCEDURE SCREENING ORDER (NO ISOLATION) - Swab, Nasopharynx [[id number]] Collected: 09/29/20 1819    Specimen: Swab from Nasopharynx Updated: 09/30/20 1135    Narrative:      The following orders were created for panel order COVID PRE-OP / PRE-PROCEDURE SCREENING ORDER (NO ISOLATION) - Swab, Nasopharynx.  Procedure                               Abnormality         Status                     ---------                               -----------         ------                     COVID-19,BIOTAP, NP/OP S...[[id number]]                      Final result                 Please view results for these tests on the individual orders.    COVID-19,BIOTAP, NP/OP SWAB IN TRANSPORT MEDIA OR SALINE 24-36 HR TAT - Swab, Nasopharynx [[id number]] Collected: 09/29/20 1819    Specimen: Swab from Nasopharynx Updated: 09/30/20 1135     SARS-CoV-2 PCR Not Detected     Comment: Nucleic acid specific to SARS-CoV-2 (COVID-19) virus was not detected inthis sample by the TaqPath (TM) COVID-19 Combo Kit.SARS-CoV-2 (COVID-19) nucleic acid testing performed using [employer] Aptima (R) SARS-CoV-2 Assay or [employer] TaqPath    "  (TM)COVID-19 Combo Kit as indicated above under Emergency Use Authorization (EUA) from the FDA. Aptima (R) and TaqPath (TM) test performancecharacteristics verified by [facility] in accordance with the FDAs Guidance Document (Policy for Diagnostic   Tests for Coronavirus Disease-2019during the Public Health Emergency) issued on March 16, 2020. The laboratory is regulated under CLIA as qualified to perform high-complexity testingUnless otherwise noted, all testing was performed at [facility],   CLIA No. [id number], [place] State Licensee No. [id number]       Respiratory Culture - Sputum, Cough [[id number]] Collected: 09/30/20 1114    Specimen: Sputum from Cough Updated: 09/30/20 1123    Procalcitonin [[id number]]  (Normal) Collected: 09/30/20 0702    Specimen: Blood from Hand, Left Updated: 09/30/20 0810     Procalcitonin 0.06 ng/mL     Narrative:      As a Marker for Sepsis (Non-Neonates):   1. <0.5 ng/mL represents a low risk of severe sepsis and/or septic shock.  1. >2 ng/mL represents a high risk of severe sepsis and/or septic shock.    As a Marker for Lower Respiratory Tract Infections that require antibiotic therapy:  PCT on Admission     Antibiotic Therapy             6-12 Hrs later  > 0.5                Strongly Recommended            >0.25 - <0.5         Recommended  0.1 - 0.25           Discouraged                   Remeasure/reassess PCT  <0.1                 Strongly Discouraged          Remeasure/reassess PCT      As 28 day mortality risk marker: \"Change in Procalcitonin Result\" (> 80 % or <=80 %) if Day 0 (or Day 1) and Day 4 values are available. Refer to http://www.[employer]-pct-[email or web address]/   Change in PCT <=80 %   A decrease of PCT levels below or equal to 80 % defines a positive change in PCT test result representing a higher risk for 28-day all-cause mortality of patients diagnosed with severe sepsis or septic shock.  Change in PCT > 80 %   A decrease of PCT levels of more than 80 % defines a " "negative change in PCT result representing a lower risk for 28-day all-cause mortality of patients diagnosed with severe sepsis or septic shock.                Results may be falsely decreased if patient taking Biotin.     TSH [[id number]]  (Normal) Collected: 09/30/20 0702    Specimen: Blood from Hand, Left Updated: 09/30/20 0805     TSH 0.801 uIU/mL     Basic Metabolic Panel [[id number]]  (Abnormal) Collected: 09/30/20 0702    Specimen: Blood from Hand, Left Updated: 09/30/20 0803     Glucose 235 mg/dL      BUN 16 mg/dL      Creatinine 0.84 mg/dL      Sodium 142 mmol/L      Potassium 4.1 mmol/L      Chloride 108 mmol/L      CO2 21.9 mmol/L      Calcium 8.3 mg/dL      eGFR Non African Amer 64 mL/min/1.73      BUN/Creatinine Ratio 19.0     Anion Gap 12.1 mmol/L     Narrative:      GFR Normal >60  Chronic Kidney Disease <60  Kidney Failure <15      Protime-INR [[id number]]  (Abnormal) Collected: 09/30/20 0702    Specimen: Blood from Hand, Left Updated: 09/30/20 0741     Protime 35.9 Seconds      INR 3.78    Procalcitonin [[id number]]  (Normal) Collected: 09/29/20 2004    Specimen: Blood Updated: 09/30/20 0024     Procalcitonin 0.05 ng/mL     Narrative:      As a Marker for Sepsis (Non-Neonates):   1. <0.5 ng/mL represents a low risk of severe sepsis and/or septic shock.  1. >2 ng/mL represents a high risk of severe sepsis and/or septic shock.    As a Marker for Lower Respiratory Tract Infections that require antibiotic therapy:  PCT on Admission     Antibiotic Therapy             6-12 Hrs later  > 0.5                Strongly Recommended            >0.25 - <0.5         Recommended  0.1 - 0.25           Discouraged                   Remeasure/reassess PCT  <0.1                 Strongly Discouraged          Remeasure/reassess PCT      As 28 day mortality risk marker: \"Change in Procalcitonin Result\" (> 80 % or <=80 %) if Day 0 (or Day 1) and Day 4 values are available. Refer to http://www.[employer]-pct-calculator.com/   Change " in PCT <=80 %   A decrease of PCT levels below or equal to 80 % defines a positive change in PCT test result representing a higher risk for 28-day all-cause mortality of patients diagnosed with severe sepsis or septic shock.  Change in PCT > 80 %   A decrease of PCT levels of more than 80 % defines a negative change in PCT result representing a lower risk for 28-day all-cause mortality of patients diagnosed with severe sepsis or septic shock.                Results may be falsely decreased if patient taking Biotin.     POC Glucose Once [[id number]]  (Normal) Collected: 09/30/20 0006    Specimen: Blood Updated: 09/30/20 0007     Glucose 118 mg/dL     Troponin [[id number]]  (Normal) Collected: 09/29/20 2004    Specimen: Blood Updated: 09/29/20 2041     Troponin T <0.010 ng/mL     Narrative:      Troponin T Reference Range:  <= 0.03 ng/mL-   Negative for AMI  >0.03 ng/mL-     Abnormal for myocardial necrosis.  Clinicians would have to utilize clinical acumen, EKG, Troponin and serial changes to determine if it is an Acute Myocardial Infarction or myocardial injury due to an underlying chronic condition.       Results may be falsely decreased if patient taking Biotin.      Blood Culture - Blood, Arm, Left [[id number]] Collected: 09/29/20 2004    Specimen: Blood from Arm, Left Updated: 09/29/20 2012    CBC & Differential [[id number]]  (Abnormal) Collected: 09/29/20 1755    Specimen: Blood Updated: 09/29/20 1928    Narrative:      The following orders were created for panel order CBC & Differential.  Procedure                               Abnormality         Status                     ---------                               -----------         ------                     CBC Auto Differential[[id number]]        Abnormal            Final result                 Please view results for these tests on the individual orders.    CBC Auto Differential [[id number]]  (Abnormal) Collected: 09/29/20 1755    Specimen: Blood Updated:  09/29/20 1928     WBC 24.44 10*3/mm3      RBC 4.22 10*6/mm3      Hemoglobin 12.7 g/dL      Hematocrit 39.2 %      MCV 92.9 fL      MCH 30.1 pg      MCHC 32.4 g/dL      RDW 12.7 %      RDW-SD 42.6 fl      MPV 10.0 fL      Platelets 158 10*3/mm3     Manual Differential [[id number]]  (Abnormal) Collected: 09/29/20 1755    Specimen: Blood Updated: 09/29/20 1928     Neutrophil % 28.0 %      Lymphocyte % 70.0 %      Monocyte % 1.0 %      Basophil % 1.0 %      Neutrophils Absolute 6.84 10*3/mm3      Lymphocytes Absolute 17.11 10*3/mm3      Monocytes Absolute 0.24 10*3/mm3      Basophils Absolute 0.24 10*3/mm3      RBC Morphology Normal     Smudge Cells Mod/2+     Platelet Morphology Normal    BNP [[id number]]  (Normal) Collected: 09/29/20 1755    Specimen: Blood Updated: 09/29/20 1847     proBNP 535.5 pg/mL     Narrative:      Among patients with dyspnea, NT-proBNP is highly sensitive for the detection of acute congestive heart failure. In addition NT-proBNP of <300 pg/ml effectively rules out acute congestive heart failure with 99% negative predictive value.    Results may be falsely decreased if patient taking Biotin.      Troponin [[id number]]  (Normal) Collected: 09/29/20 1755    Specimen: Blood Updated: 09/29/20 1847     Troponin T <0.010 ng/mL     Narrative:      Troponin T Reference Range:  <= 0.03 ng/mL-   Negative for AMI  >0.03 ng/mL-     Abnormal for myocardial necrosis.  Clinicians would have to utilize clinical acumen, EKG, Troponin and serial changes to determine if it is an Acute Myocardial Infarction or myocardial injury due to an underlying chronic condition.       Results may be falsely decreased if patient taking Biotin.      Comprehensive Metabolic Panel [[id number]]  (Abnormal) Collected: 09/29/20 1755    Specimen: Blood Updated: 09/29/20 1843     Glucose 131 mg/dL      BUN 16 mg/dL      Creatinine 1.00 mg/dL      Sodium 141 mmol/L      Potassium 3.2 mmol/L      Chloride 105 mmol/L      CO2 22.9 mmol/L       Calcium 9.1 mg/dL      Total Protein 6.4 g/dL      Albumin 3.90 g/dL      ALT (SGPT) 23 U/L      AST (SGOT) 17 U/L      Alkaline Phosphatase 105 U/L      Total Bilirubin 0.8 mg/dL      eGFR Non African Amer 53 mL/min/1.73      Globulin 2.5 gm/dL      A/G Ratio 1.6 g/dL      BUN/Creatinine Ratio 16.0     Anion Gap 13.1 mmol/L     Narrative:      GFR Normal >60  Chronic Kidney Disease <60  Kidney Failure <15      Lactic Acid, Plasma [[id number]]  (Normal) Collected: 09/29/20 1755    Specimen: Blood Updated: 09/29/20 1831     Lactate 1.0 mmol/L     D-dimer, Quantitative [[id number]]  (Abnormal) Collected: 09/29/20 1755    Specimen: Blood Updated: 09/29/20 1829     D-Dimer, Quantitative 0.66 MCGFEU/mL     Narrative:      The [employer]ago D-Dimer test used in conjunction with a clinical pretest probability (PTP) assessment model, has been approved by the FDA to rule out the presence of venous thromboembolism (VTE) in outpatients suspected of deep venous thrombosis (DVT) or pulmonary embolism (PE). The cut-off for negative predictive value is <0.50 MCGFEU/mL.    Protime-INR [[id number]]  (Abnormal) Collected: 09/29/20 1755    Specimen: Blood Updated: 09/29/20 1829     Protime 33.2 Seconds      INR 3.41    Blood Culture - Blood, Arm, Left [[id number]] Collected: 09/29/20 1755    Specimen: Blood from Arm, Left Updated: 09/29/20 1812    Blood Gas, Venous [[id number]]  (Abnormal) Collected: 09/29/20 1801    Specimen: Venous Blood Updated: 09/29/20 1804     Site RV     pH, Venous 7.431 pH Units      pCO2, Venous 34.2 mm Hg      pO2, Venous 40.6 mm Hg      HCO3, Venous 22.7 mmol/L      Base Excess, Venous -1.1 mmol/L      O2 Saturation Calculated 77.8 %      Barometric Pressure for Blood Gas 744.9 mmHg      Modality Room Air     Rate 26 Breaths/minute         Data Review:  Results from last 7 days   Lab Units 10/08/20  0734 10/07/20  0755 10/06/20  0710   SODIUM mmol/L 137 135* 139   POTASSIUM mmol/L 4.2 4.0 3.9   CHLORIDE mmol/L 97* 97*  99   CO2 mmol/L 28.4 30.9* 30.8*   BUN mg/dL 21 17 16   CREATININE mg/dL 0.85 0.83 0.78   GLUCOSE mg/dL 279* 147* 150*   CALCIUM mg/dL 8.5* 8.2* 8.4*     Results from last 7 days   Lab Units 10/08/20  0734 10/07/20  0755 10/06/20  0710   WBC 10*3/mm3 16.55* 14.07* 16.15*   HEMOGLOBIN g/dL 11.7* 11.6* 11.8*   HEMATOCRIT % 36.0 36.9 36.0   PLATELETS 10*3/mm3 189 173 171             Lab Results   Lab Value Date/Time    TROPONINT <0.010 09/29/2020 2004    TROPONINT <0.010 09/29/2020 1755    TROPONINT <0.010 08/29/2020 1036    TROPONINT <0.010 04/28/2020 1421    TROPONINT <0.010 04/23/2020 2143    TROPONINT 0.014 02/11/2020 1748    TROPONINT <0.010 09/16/2019 1207    TROPONINT 0.12 (C) 03/27/2014 0955    TROPONINT 0.14 (C) 03/27/2014 0355    TROPONINT 0.14 (C) 03/26/2014 1755    TROPONINT 0.15 (C) 03/26/2014 0349         Results from last 7 days   Lab Units 10/02/20  0747   ALK PHOS U/L 106   BILIRUBIN mg/dL 0.5   ALT (SGPT) U/L 25   AST (SGOT) U/L 22             Glucose   Date/Time Value Ref Range Status   10/08/2020 1616 153 (H) 70 - 130 mg/dL Final   10/08/2020 1159 191 (H) 70 - 130 mg/dL Final   10/08/2020 0801 291 (H) 70 - 130 mg/dL Final   10/08/2020 0751 264 (H) 70 - 130 mg/dL Final   10/07/2020 1646 127 70 - 130 mg/dL Final   10/07/2020 1200 127 70 - 130 mg/dL Final   10/07/2020 0624 157 (H) 70 - 130 mg/dL Final   10/06/2020 2051 194 (H) 70 - 130 mg/dL Final     Results from last 7 days   Lab Units 10/08/20  0734 10/07/20  0755 10/06/20  0710   INR  1.94* 1.67* 1.62*       Past Medical History:   Diagnosis Date   • Aortic calcification (CMS/[facility])     mild, on echo 12/17/2017   • Aortic regurgitation     Trace   • Asthma    • Atrial fibrillation (CMS/[facility])    • CAD (coronary artery disease)    • Chronic combined systolic and diastolic congestive heart failure (CMS/[facility])    • CKD (chronic kidney disease) stage 3, GFR 30-59 ml/min    • Coronary artery disease involving native coronary artery of native heart with angina  pectoris (CMS/HCC)    • Disc degeneration, lumbar    • DM type 2 (diabetes mellitus, type 2) (CMS/HCC)    • GERD (gastroesophageal reflux disease)    • History of aneurysm     right femoral artery s/p LHC   • History of blood transfusion    • History of fracture    • History of vitamin D deficiency    • Hyperlipidemia    • Hypertension    • Mild mitral regurgitation    • Mitral annular calcification     12/8/2017- echo, moderate   • PAF (paroxysmal atrial fibrillation) (CMS/[facility])    • Peripheral neuropathy    • Skin cancer     Left hand   • Sleep apnea    • SSS (sick sinus syndrome) (CMS/[facility])    • Stroke (cerebrum) (CMS/[facility])    • Tricuspid regurgitation     Trace       Assessment:  Active Hospital Problems    Diagnosis  POA   • **Chronic bronchitis (CMS/[facility]) [J42]  Yes   • Pneumonia due to gram-negative bacteria (CMS/[facility]) [J15.6]  Unknown   • Closed wedge compression fracture of T5 vertebra (CMS/[facility]) [S22.050A]  Unknown   • Dyspnea [R06.00]  Yes   • Anticoagulated on Coumadin [Z79.01]  Not Applicable   • CLL (chronic lymphoid leukemia) in relapse (CMS/[facility]) [C91.92]  Yes   • Rhinovirus [B34.8]  Yes   • Morbid obesity (CMS/[facility]) [E66.01]  Yes   • HTN (hypertension) [I10]  Yes   • CKD (chronic kidney disease), stage III [N18.30]  Yes   • DM II (diabetes mellitus, type II), controlled (CMS/[facility]) [E11.9]  Yes   • Paroxysmal atrial fibrillation (CMS/[facility]) [I48.0]  Yes      Resolved Hospital Problems   No resolved problems to display.       Plan:  Continue the nebs and O 2, await pulmonary. Follow lab.  Antibiotics for PNA. Bronch report noted.  Back brace. Posible repea bronch tomorrow     Saman Cruz MD      10/8/2020  16:28 EDT

## 2020-10-08 NOTE — THERAPY TREATMENT NOTE
Patient Name: Caitlyn Longoria  : 1934    MRN: [id number]                              Today's Date: 10/8/2020       Admit Date: 2020    Visit Dx:     ICD-10-CM ICD-9-CM   1. Pneumonia due to gram-negative bacteria (CMS/[facility])  J15.6 482.83   2. Dyspnea, unspecified type  R06.00 786.09   3. Pneumonia due to infectious organism, unspecified laterality, unspecified part of lung  J18.9 486   4. Leukocytosis, unspecified type  D72.829 288.60     Patient Active Problem List   Diagnosis   • Neck and shoulder pain   • Arthropathy of shoulder region   • Carpal tunnel syndrome of left wrist   • Chronic pain of both shoulders   • Chronic left shoulder pain   • Arthropathy of left shoulder   • Dysarthria   • DM II (diabetes mellitus, type II), controlled (CMS/[facility])   • Paroxysmal atrial fibrillation (CMS/[facility])   • HLD (hyperlipidemia)   • Chronic bronchitis (CMS/[facility])   • Coronary artery disease involving native coronary artery of native heart with angina pectoris (CMS/[facility])   • CVA (cerebral vascular accident) (CMS/[facility])   • HTN (hypertension)   • CKD (chronic kidney disease), stage III   • Chronic combined systolic and diastolic congestive heart failure (CMS/[facility])   • Infection of prosthetic right knee joint (CMS/[facility])   • Stasis dermatitis of right lower extremity due to peripheral venous hypertension   • Current use of long term anticoagulation   • Morbid obesity (CMS/[facility])   • Acute respiratory failure with hypoxia (CMS/[facility])   • Rhinovirus   • Asthma with acute exacerbation   • Acute respiratory failure with hypoxemia (CMS/[facility])   • Viral pneumonia   • Hypoxia   • CLL (chronic lymphoid leukemia) in relapse (CMS/[facility])   • Dyspnea   • Anticoagulated on Coumadin   • Closed wedge compression fracture of T5 vertebra (CMS/[facility])   • Pneumonia due to gram-negative bacteria (CMS/[facility])     Past Medical History:   Diagnosis Date   • Aortic calcification (CMS/[facility])     mild, on echo 2017   • Aortic regurgitation     Trace   •  Asthma    • Atrial fibrillation (CMS/[facility])    • CAD (coronary artery disease)    • Chronic combined systolic and diastolic congestive heart failure (CMS/[facility])    • CKD (chronic kidney disease) stage 3, GFR 30-59 ml/min    • Coronary artery disease involving native coronary artery of native heart with angina pectoris (CMS/[facility])    • Disc degeneration, lumbar    • DM type 2 (diabetes mellitus, type 2) (CMS/[facility])    • GERD (gastroesophageal reflux disease)    • History of aneurysm     right femoral artery s/p LHC   • History of blood transfusion    • History of fracture    • History of vitamin D deficiency    • Hyperlipidemia    • Hypertension    • Mild mitral regurgitation    • Mitral annular calcification     12/8/2017- echo, moderate   • PAF (paroxysmal atrial fibrillation) (CMS/[facility])    • Peripheral neuropathy    • Skin cancer     Left hand   • Sleep apnea    • SSS (sick sinus syndrome) (CMS/[facility])    • Stroke (cerebrum) (CMS/[facility])    • Tricuspid regurgitation     Trace     Past Surgical History:   Procedure Laterality Date   • BRONCHOSCOPY Bilateral 10/6/2020    Procedure: BRONCHOSCOPY with BILATERAL LUNG washings;  Surgeon: Juno Coburn MD;  Location: [facility] ENDOSCOPY;  Service: Pulmonary;  Laterality: Bilateral;  PRE: purulent bronchitis  POST: PURULENT BRONCHITIS   • CARDIAC CATHETERIZATION     • CARDIAC ELECTROPHYSIOLOGY PROCEDURE N/A 2/7/2020    Procedure: PPM generator change - dual  medtronic;  Surgeon: Kiel Field MD;  Location: [facility] CATH INVASIVE LOCATION;  Service: Cardiology;  Laterality: N/A;   • CHOLECYSTECTOMY     • CORONARY STENT PLACEMENT     • HERNIA REPAIR      hital hernia   • HYSTERECTOMY     • PACEMAKER IMPLANTATION     • REPLACEMENT TOTAL KNEE Bilateral      General Information     Row Name 10/08/20 171          Physical Therapy Time and Intention    Document Type  therapy note (daily note)  -     Mode of Treatment  individual therapy;physical therapy  -     Row Name 10/08/20 1719  "         General Information    Patient Profile Reviewed  yes  -     Existing Precautions/Restrictions  fall;oxygen therapy device and L/min  -       User Key  (r) = Recorded By, (t) = Taken By, (c) = Cosigned By    Initials Name Provider Type    Jaylin Whittaker PTA Physical Therapy Assistant        Mobility     Row Name 10/08/20 1713          Bed Mobility    Supine-Sit Harney (Bed Mobility)  modified independence offered safety educ due to spinal fx, pt states she cannot do a log roll  -JM     Sit-Supine [place] (Bed Mobility)  contact guard for last effort to get legs over EOB and into bed  -     Row Name 10/08/20 1713          Sit-Stand Transfer    Sit-Stand Harney (Transfers)  supervision slow paced due to wkness and fatigue from \"coughing spell\"  -     Assistive Device (Sit-Stand Transfers)  walker, front-wheeled hers  -     Row Name 10/08/20 1713          Gait/Stairs (Locomotion)    Harney Level (Gait)  contact guard  -     Assistive Device (Gait)  walker, front-wheeled  -     Distance in Feet (Gait)  130ft, fatigue and coughing limiting dist  -JM     Deviations/Abnormal Patterns (Gait)  antalgic;jeremi decreased  -     Bilateral Gait Deviations  forward flexed posture educ on posture possibly effecting incr back pain in thoracic region where fx is located  -       User Key  (r) = Recorded By, (t) = Taken By, (c) = Cosigned By    Initials Name Provider Type    Jaylin Whittaker PTA Physical Therapy Assistant        Obj/Interventions    No documentation.       Goals/Plan    No documentation.       Clinical Impression     Row Name 10/08/20 7259          Pain Scale: Numbers Pre/Post-Treatment    Pretreatment Pain Rating  3/10  -JM     Posttreatment Pain Rating  7/10  -JM     Pain Location  back  -     Pre/Posttreatment Pain Comment  doesn't like brace today, states not helping at times and just a bother to put on  -     Pain Intervention(s)  Repositioned;Rest  " -LIBRA     Row Name 10/08/20 1719          Plan of Care Review    Plan of Care Reviewed With  patient;family niece present  -LIBRA     Progress  improving  -     Outcome Summary  pt agreeable , but had lots of questions, concerns about her plan of care, brace etc, Neuro group visit , had to take multiple rests to complete tasks; did agree to work on bed mobility  -     Row Name 10/08/20 1723          Positioning and Restraints    Pre-Treatment Position  sitting in chair/recliner  -     Post Treatment Position  chair  -JM     In Chair  reclined;call light within reach;encouraged to call for assist;exit alarm on;with family/caregiver;notified nsg  -LIBRA       User Key  (r) = Recorded By, (t) = Taken By, (c) = Cosigned By    Initials Name Provider Type    Jaylin Whittaker PTA Physical Therapy Assistant        Outcome Measures     Row Name 10/08/20 1722          How much help from another person do you currently need...    Turning from your back to your side while in flat bed without using bedrails?  4  -JM     Moving from lying on back to sitting on the side of a flat bed without bedrails?  4  -JM     Moving to and from a bed to a chair (including a wheelchair)?  3  -JM     Standing up from a chair using your arms (e.g., wheelchair, bedside chair)?  4  -JM     Climbing 3-5 steps with a railing?  1  -JM     To walk in hospital room?  3  -JM     AM-PAC 6 Clicks Score (PT)  19  -LIBRA       User Key  (r) = Recorded By, (t) = Taken By, (c) = Cosigned By    Initials Name Provider Type    Jaylin Whittaker PTA Physical Therapy Assistant        Physical Therapy Education                 Title: PT OT SLP Therapies (Done)     Topic: Physical Therapy (Done)     Point: Mobility training (Done)     Learning Progress Summary           Patient Acceptance, E,TB,D, VU,NR by LIBRA at 10/8/2020 1723    Acceptance, E,TB, VU,NR by HEATHER at 10/7/2020 1309    Acceptance, E, VU by PC at 10/7/2020 0435    Acceptance, E, VU by AL at 10/4/2020 1234     Acceptance, E,TB, VU,DU by LB at 10/3/2020 1243    Eager, E,TB,D, VU,NR by LIBRA at 10/2/2020 1717    Eager, TB, DU,NR by MARIBELL at 10/1/2020 1558    Comment: review TLSO   Family Acceptance, E,TB,D, VU,NR by LIBRA at 10/8/2020 1723                   Point: Home exercise program (Done)     Learning Progress Summary           Patient Acceptance, E,TB,D, VU,NR by LIBRA at 10/8/2020 1723    Acceptance, E,TB, VU,NR by EE at 10/7/2020 1309    Acceptance, E, VU by PC at 10/7/2020 0435    Acceptance, E, VU by AL at 10/4/2020 1234    Acceptance, E,TB, VU,DU by LB at 10/3/2020 1243    Eager, E,TB,D, VU,NR by LIBRA at 10/2/2020 1717    Eager, TB, DU,NR by MARIBELL at 10/1/2020 1558    Comment: review TLSO   Family Acceptance, E,TB,D, VU,NR by LIBRA at 10/8/2020 1723                   Point: Body mechanics (Done)     Learning Progress Summary           Patient Acceptance, E,TB,D, VU,NR by LIBRA at 10/8/2020 1723    Acceptance, E,TB, VU,NR by EE at 10/7/2020 1309    Acceptance, E, VU by PC at 10/7/2020 0435    Acceptance, E, VU by AL at 10/4/2020 1234    Acceptance, E,TB, VU,DU by JANY at 10/3/2020 1243    Eager, E,TB,D, VU,NR by LIBRA at 10/2/2020 1717    Eager, TB, DU,NR by MARIBELL at 10/1/2020 1558    Comment: review TLSO   Family Acceptance, E,TB,D, VU,NR by LIBRA at 10/8/2020 1723                   Point: Precautions (Done)     Learning Progress Summary           Patient Acceptance, E,TB,D, VU,NR by LIBRA at 10/8/2020 1723    Acceptance, E,TB, VU,NR by EE at 10/7/2020 1309    Acceptance, E, VU by PC at 10/7/2020 0435    Acceptance, E, VU by AL at 10/4/2020 1234    Acceptance, E,TB, VU,DU by JANY at 10/3/2020 1243    EILEEN Tidwell TB, D, DONALD,NR by LIBRA at 10/2/2020 1717    LACIE Tidwell DU,NR by MARIBELL at 10/1/2020 1558    Comment: review TLSO   Family EILEEN Santizo TB, D, DONALD,NR by LIBRA at 10/8/2020 1723                               User Key     Initials Effective Dates Name Provider Type Discipline    [place] 06/16/16 -  Lisy Hartman, RN Registered Nurse Nurse     04/03/18 -   Patricia Weaver, PT Physical Therapist PT    AL 04/03/18 -  Sonia Galan, PT Physical Therapist PT    JM 03/07/18 -  Jaylin Saunders PTA Physical Therapy Assistant PT    LB 08/09/20 -  Rosalinda Lawrence, PT Physical Therapist PT    DJ 10/25/19 -  Edie Lowry, PT Physical Therapist PT              PT Recommendation and Plan     Plan of Care Reviewed With: patient, family(niece present)  Progress: improving  Outcome Summary: pt agreeable , but had lots of questions, concerns about her plan of care, brace etc, Neuro group visit , had to take multiple rests to complete tasks; did agree to work on bed mobility     Time Calculation:   PT Charges     Row Name 10/08/20 1725             Time Calculation    Start Time  1530  -      Stop Time  1614  -      Time Calculation (min)  44 min  -      PT Received On  10/08/20  -LIBRA      PT - Next Appointment  10/09/20  -        User Key  (r) = Recorded By, (t) = Taken By, (c) = Cosigned By    Initials Name Provider Type     Jaylin Saunders PTA Physical Therapy Assistant        Therapy Charges for Today     Code Description Service Date Service Provider Modifiers Qty    [id number] HC PT THER PROC EA 15 MIN 10/8/2020 Jaylin Saunders PTA GP 3          PT G-Codes  Outcome Measure Options: AM-PAC 6 Clicks Basic Mobility (PT)  AM-PAC 6 Clicks Score (PT): 19    Jaylin Saunders PTA  10/8/2020

## 2020-10-08 NOTE — PLAN OF CARE
Problem: Adult Inpatient Plan of Care  Goal: Plan of Care Review  Recent Flowsheet Documentation  Taken 10/8/2020 1719 by Jaylin Saunders PTA  Progress: improving  Plan of Care Reviewed With: (niece present)   patient   family  Outcome Summary:   pt agreeable , but had lots of questions, concerns about her plan of care, brace etc, Neuro group visit , had to take multiple rests to complete tasks   did agree to work on bed mobility  Patient was wearing a face mask during this therapy encounter. Therapist used appropriate personal protective equipment including eye protection, mask, and gloves.  Mask used was standard procedure mask. Appropriate PPE was worn during the entire therapy session. Hand hygiene was completed before and after therapy session. Patient is not in enhanced droplet precautions.

## 2020-10-08 NOTE — PROGRESS NOTES
PROGRESS NOTE  Patient Name: Caitlyn Longoria  Age/Sex: 85 y.o. female  : 1934  MRN: [id number]    Date of Admission: 2020  Date of Encounter Visit: 10/08/20   LOS: 9 days   Patient Care Team:  Zenaida Suarez MD as PCP - General (Internal Medicine)  Juno Coburn MD as PCP - Claims Attributed  Pao Levi [facility] as Pharmacist  Alexander Valiente [facility] as Pharmacist (Pharmacy)  Zenaida Suarez MD as Referring Physician (Internal Medicine)  Lucien Larkin MD as Consulting Physician (Hematology and Oncology)    Chief Complaint: Still has difficulty expectorating    Hospital course: Patient had heavy growth of Pseudomonas on prior sputum culture, had evidence of pneumonia on the CAT scan.  She had a bronchoscopy with a lot of mucous plug and thick mucus retrieved but that 1 did not show any heavy growth.  Patient is on nebulized tobramycin and she was restarted on IV cefepime after the bronchoscopy finding.    Interval History: Afebrile, patient had a bronchoscopy done on 10/5/2020 with significant improvement initially however she seems to be building up with thick mucus again  She is on the hypertonic saline, she had a rapid response team assessed her yesterday and she did better with further breathing treatment.  Patient is on cefepime IV and nebulized tobramycin, on hypertonic saline, she cannot use the chest percussion because of thoracic vertebral fracture.  No fever or chills  Hoarseness is still present but is slightly better    REVIEW OF SYSTEMS:   CONSTITUTIONAL: no fever or chills  CARDIOVASCULAR: No angina type chest pain, positive chest pain from the vertebral body/rib fracture however, chest pressure or chest discomfort. No palpitations or edema.   RESPIRATORY: Still with difficulty expectorating and shortness of breath with result of that  GASTROINTESTINAL: No anorexia, nausea, vomiting or diarrhea. No abdominal pain or blood.   HEMATOLOGIC: No bleeding or bruising.  "    Ventilator/Non-Invasive Ventilation Settings (From admission, onward)    None            Vital Signs  Temp:  [97.5 °F (36.4 °C)-98 °F (36.7 °C)] 97.5 °F (36.4 °C)  Heart Rate:  [79-85] 82  Resp:  [20-24] 20  BP: (102-140)/(65-82) 133/77  SpO2:  [90 %-98 %] 96 %  on  Flow (L/min):  [2-3] 2 Device (Oxygen Therapy): humidified;nasal cannula    Intake/Output Summary (Last 24 hours) at 10/8/2020 1223  Last data filed at 10/8/2020 0100  Gross per 24 hour   Intake 720 ml   Output --   Net 720 ml     Flowsheet Rows      First Filed Value   Admission Height  157.5 cm (62\") Documented at 09/29/2020 2223   Admission Weight  95.7 kg (211 lb) Documented at 09/29/2020 2223        Body mass index is 38.59 kg/m².      09/29/20 2223   Weight: 95.7 kg (211 lb)       Physical Exam:  GEN:  No acute distress, alert, cooperative, well developed, on nasal cannula oxygen  EYES:   Sclerae clear. No icterus. PERRL. Normal EOM  ENT:   External ears/nose normal, no oral lesions, no thrush, mucous membranes slightly dry  NECK:  Supple, midline trachea, no JVD  LUNGS: Normal chest on inspection, she does have some coarse rhonchi, not as bad as at the time before bronchoscopy, actually slightly better than yesterday.   CV:  Regular rhythm and rate. Normal S1/S2. No murmurs, gallops, or rubs noted.  ABD:  Soft, nontender and nondistended. Normal bowel sounds. No guarding  EXT:  Moves all extremities well. No cyanosis. No redness.  Positive edema.   Skin: Dry, intact, no bleeding    Results Review:    Results From Last 14 Days   Lab Units 09/29/20  1755   D DIMER QUANT MCGFEU/mL 0.66*   LACTATE mmol/L 1.0     Results from last 7 days   Lab Units 10/08/20  0734 10/07/20  0755 10/06/20  0710 10/05/20  0513 10/04/20  0619 10/03/20  0830 10/02/20  0747   SODIUM mmol/L 137 135* 139 136 133* 140 139   POTASSIUM mmol/L 4.2 4.0 3.9 3.5 3.8 4.1 3.7   CHLORIDE mmol/L 97* 97* 99 99 96* 100 101   CO2 mmol/L 28.4 30.9* 30.8* 26.5 30.5* 29.6* 31.1*   BUN " mg/dL 21 17 16 18 20 24* 25*   CREATININE mg/dL 0.85 0.83 0.78 0.85 0.80 0.90 0.87   CALCIUM mg/dL 8.5* 8.2* 8.4* 8.5* 8.3* 8.6 8.4*   AST (SGOT) U/L  --   --   --   --   --   --  22   ALT (SGPT) U/L  --   --   --   --   --   --  25   ANION GAP mmol/L 11.6 7.1 9.2 10.5 6.5 10.4 6.9   ALBUMIN g/dL  --   --   --   --   --   --  3.80                 Results from last 7 days   Lab Units 10/08/20  0734 10/07/20  0755 10/06/20  0710 10/05/20  0513 10/04/20  0619 10/03/20  0830 10/02/20  0747   WBC 10*3/mm3 16.55* 14.07* 16.15* 15.53* 15.08* 19.64* 22.64*   HEMOGLOBIN g/dL 11.7* 11.6* 11.8* 11.2* 11.2* 12.0 12.4   HEMATOCRIT % 36.0 36.9 36.0 34.3 35.1 39.1 38.8   PLATELETS 10*3/mm3 189 173 171 150 136* 162 158   MCV fL 93.5 95.8 91.8 92.0 96.4 97.5* 94.4     Results from last 7 days   Lab Units 10/08/20  0734 10/07/20  0755 10/06/20  0710   INR  1.94* 1.67* 1.62*               Invalid input(s): LDLCALC  Results from last 7 days   Lab Units 10/07/20  1555   PH, ARTERIAL pH units 7.397   PCO2, ARTERIAL mm Hg 46.9*   PO2 ART mm Hg 62.4*   HCO3 ART mmol/L 28.9*         Glucose   Date/Time Value Ref Range Status   10/08/2020 1159 191 (H) 70 - 130 mg/dL Final   10/08/2020 0801 291 (H) 70 - 130 mg/dL Final   10/08/2020 0751 264 (H) 70 - 130 mg/dL Final   10/07/2020 1646 127 70 - 130 mg/dL Final   10/07/2020 1200 127 70 - 130 mg/dL Final   10/07/2020 0624 157 (H) 70 - 130 mg/dL Final   10/06/2020 2051 194 (H) 70 - 130 mg/dL Final   10/06/2020 1617 140 (H) 70 - 130 mg/dL Final     Results from last 7 days   Lab Units 10/02/20  0747   PROCALCITONIN ng/mL 0.07     Results from last 7 days   Lab Units 10/06/20  1020   RESPCX  Rare Normal Respiratory Milana: NO S.aureus/MRSA or Pseudomonas aeruginosa         Results from last 7 days   Lab Units 10/01/20  1558   ADENOVIRUS DETECTION BY PCR  Not Detected   CORONAVIRUS 229E  Not Detected   CORONAVIRUS HKU1  Not Detected   CORONAVIRUS NL63  Not Detected   CORONAVIRUS OC43  Not Detected      HUMAN METAPNEUMOVIRUS  Not Detected   HUMAN RHINOVIRUS/ENTEROVIRUS  Detected*   INFLUENZA B PCR  Not Detected   PARAINFLUENZA 1  Not Detected   PARAINFLUENZA VIRUS 2  Not Detected   PARAINFLUENZA VIRUS 3  Not Detected   PARAINFLUENZA VIRUS 4  Not Detected   BORDETELLA PERTUSSIS PCR  Not Detected   BORDETELLA PARAPERTUSSIS PCR  Not Detected   [id number]  Not Detected   CHLAMYDOPHILA PNEUMONIAE PCR  Not Detected   MYCOPLAMA PNEUMO PCR  Not Detected   INFLUENZA A H3  Not Detected   INFLUENZA A H1  Not Detected   RSV, PCR  Not Detected               Imaging:   Imaging Results (All)     Procedure Component Value Units Date/Time       I reviewed the patient's new clinical results.  I personally viewed and interpreted the patient's imaging results: Improved aeration of the left lower lobe with persistent elevation of the left hemidiaphragm, known pre-existing condition        Medication Review:   aspirin, 81 mg, Oral, Daily  budesonide-formoterol, 2 puff, Inhalation, BID - RT  carvedilol, 3.125 mg, Oral, BID  cefepime, 2 g, Intravenous, Q12H  fluticasone, 1 spray, Nasal, BID  furosemide, 20 mg, Oral, QAM  glipizide, 2.5 mg, Oral, QAM  guaiFENesin, 600 mg, Oral, Q12H  insulin lispro, 0-14 Units, Subcutaneous, TID AC  ipratropium-albuterol, 3 mL, Nebulization, 4x Daily - RT  losartan, 25 mg, Oral, Daily  montelukast, 10 mg, Oral, Nightly  oxazepam, 10 mg, Oral, Nightly  oxybutynin XL, 10 mg, Oral, Nightly  pantoprazole, 40 mg, Oral, Daily  rosuvastatin, 20 mg, Oral, Nightly  sodium chloride, 4 mL, Nebulization, BID  tobramycin, 300 mg, Nebulization, BID - RT  warfarin (COUMADIN) (dosing per levels), , Does not apply, Daily  warfarin, 3 mg, Oral, Daily        Pharmacy to dose warfarin,         ASSESSMENT:   1. COPD/asthma with mild exacerbation  2. Right lower lobe pneumonia with heavy growth of Pseudomonas on cultures  3. Chronic bronchitis  4. Acute hypoxemic respiratory failure  5. Elevated left hemidiaphragm:  Chronic  6. Ischemic cardiomyopathy  7. Diastolic dysfunction  8. Mitral regurgitation  9. Atrial fibrillation and sick sinus syndrome  10. CLL  11. 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 Hypertension  13. Diabetes  14. CHARLENE on BiPAP  15. Immunodeficiency    PLAN:  Patien discussed with the patient, I consider sclerosis for about repeated bronchoscopy at this point but that something that may be Needed down the road if not have further improvement  Continue with hypertonic saline by the nebulizer but increase frequency to 3 times daily and add Aerobika device  We will keep her n.p.o. after midnight just in case her secretion on building up again and bronchoscopy deemed necessary    Discussed with the patient and with the respiratory staff       Disposition: Home    Juno Coburn MD  10/08/20  12:23 EDT            Dictated utilizing Dragon dictation

## 2020-10-09 ENCOUNTER — ANESTHESIA EVENT (OUTPATIENT)
Dept: GASTROENTEROLOGY | Facility: HOSPITAL | Age: 85
End: 2020-10-09

## 2020-10-09 ENCOUNTER — ANESTHESIA (OUTPATIENT)
Dept: GASTROENTEROLOGY | Facility: HOSPITAL | Age: 85
End: 2020-10-09

## 2020-10-09 PROBLEM — J18.9 PNEUMONIA DUE TO INFECTIOUS ORGANISM: Status: ACTIVE | Noted: 2020-09-29

## 2020-10-09 LAB
ANION GAP SERPL CALCULATED.3IONS-SCNC: 11.3 MMOL/L (ref 5–15)
B PARAPERT DNA SPEC QL NAA+PROBE: NOT DETECTED
B PERT DNA SPEC QL NAA+PROBE: NOT DETECTED
BUN SERPL-MCNC: 23 MG/DL (ref 8–23)
BUN/CREAT SERPL: 22.3 (ref 7–25)
C PNEUM DNA NPH QL NAA+NON-PROBE: NOT DETECTED
CALCIUM SPEC-SCNC: 8.9 MG/DL (ref 8.6–10.5)
CHLORIDE SERPL-SCNC: 97 MMOL/L (ref 98–107)
CO2 SERPL-SCNC: 29.7 MMOL/L (ref 22–29)
CREAT SERPL-MCNC: 1.03 MG/DL (ref 0.57–1)
DEPRECATED RDW RBC AUTO: 44.2 FL (ref 37–54)
ERYTHROCYTE [DISTWIDTH] IN BLOOD BY AUTOMATED COUNT: 12.9 % (ref 12.3–15.4)
FLUAV H1 2009 PAND RNA NPH QL NAA+PROBE: NOT DETECTED
FLUAV H1 HA GENE NPH QL NAA+PROBE: NOT DETECTED
FLUAV H3 RNA NPH QL NAA+PROBE: NOT DETECTED
FLUAV SUBTYP SPEC NAA+PROBE: NOT DETECTED
FLUBV RNA ISLT QL NAA+PROBE: NOT DETECTED
GFR SERPL CREATININE-BSD FRML MDRD: 51 ML/MIN/1.73
GLUCOSE BLDC GLUCOMTR-MCNC: 121 MG/DL (ref 70–130)
GLUCOSE BLDC GLUCOMTR-MCNC: 143 MG/DL (ref 70–130)
GLUCOSE BLDC GLUCOMTR-MCNC: 174 MG/DL (ref 70–130)
GLUCOSE SERPL-MCNC: 164 MG/DL (ref 65–99)
HADV DNA SPEC NAA+PROBE: NOT DETECTED
HCOV 229E RNA SPEC QL NAA+PROBE: NOT DETECTED
HCOV HKU1 RNA SPEC QL NAA+PROBE: NOT DETECTED
HCOV NL63 RNA SPEC QL NAA+PROBE: NOT DETECTED
HCOV OC43 RNA SPEC QL NAA+PROBE: NOT DETECTED
HCT VFR BLD AUTO: 35.9 % (ref 34–46.6)
HGB BLD-MCNC: 11.7 G/DL (ref 12–15.9)
HMPV RNA NPH QL NAA+NON-PROBE: NOT DETECTED
HPIV1 RNA SPEC QL NAA+PROBE: NOT DETECTED
HPIV2 RNA SPEC QL NAA+PROBE: NOT DETECTED
HPIV3 RNA NPH QL NAA+PROBE: NOT DETECTED
HPIV4 P GENE NPH QL NAA+PROBE: NOT DETECTED
INR PPP: 2.28 (ref 0.9–1.1)
LYMPHOCYTES # BLD MANUAL: 17.01 10*3/MM3 (ref 0.7–3.1)
LYMPHOCYTES NFR BLD MANUAL: 1 % (ref 5–12)
LYMPHOCYTES NFR BLD MANUAL: 83 % (ref 19.6–45.3)
M PNEUMO IGG SER IA-ACNC: NOT DETECTED
MCH RBC QN AUTO: 30.5 PG (ref 26.6–33)
MCHC RBC AUTO-ENTMCNC: 32.6 G/DL (ref 31.5–35.7)
MCV RBC AUTO: 93.7 FL (ref 79–97)
MONOCYTES # BLD AUTO: 0.2 10*3/MM3 (ref 0.1–0.9)
NEUTROPHILS # BLD AUTO: 3.28 10*3/MM3 (ref 1.7–7)
NEUTROPHILS NFR BLD MANUAL: 16 % (ref 42.7–76)
OVALOCYTES BLD QL SMEAR: ABNORMAL
PLAT MORPH BLD: NORMAL
PLATELET # BLD AUTO: 212 10*3/MM3 (ref 140–450)
PMV BLD AUTO: 9.9 FL (ref 6–12)
POTASSIUM SERPL-SCNC: 4 MMOL/L (ref 3.5–5.2)
PROTHROMBIN TIME: 24.5 SECONDS (ref 11.7–14.2)
RBC # BLD AUTO: 3.83 10*6/MM3 (ref 3.77–5.28)
RHINOVIRUS RNA SPEC NAA+PROBE: DETECTED
RSV RNA NPH QL NAA+NON-PROBE: NOT DETECTED
SODIUM SERPL-SCNC: 138 MMOL/L (ref 136–145)
WBC # BLD AUTO: 20.49 10*3/MM3 (ref 3.4–10.8)
WBC MORPH BLD: NORMAL

## 2020-10-09 PROCEDURE — 25010000002 FENTANYL CITRATE (PF) 100 MCG/2ML SOLUTION: Performed by: ANESTHESIOLOGY

## 2020-10-09 PROCEDURE — 94799 UNLISTED PULMONARY SVC/PX: CPT

## 2020-10-09 PROCEDURE — 85007 BL SMEAR W/DIFF WBC COUNT: CPT | Performed by: HOSPITALIST

## 2020-10-09 PROCEDURE — 82962 GLUCOSE BLOOD TEST: CPT

## 2020-10-09 PROCEDURE — 87205 SMEAR GRAM STAIN: CPT | Performed by: INTERNAL MEDICINE

## 2020-10-09 PROCEDURE — 25010000002 PHENYLEPHRINE PER 1 ML: Performed by: ANESTHESIOLOGY

## 2020-10-09 PROCEDURE — 87206 SMEAR FLUORESCENT/ACID STAI: CPT | Performed by: INTERNAL MEDICINE

## 2020-10-09 PROCEDURE — 85025 COMPLETE CBC W/AUTO DIFF WBC: CPT | Performed by: HOSPITALIST

## 2020-10-09 PROCEDURE — 63710000001 TOBRAMYCIN PER 300 MG: Performed by: INTERNAL MEDICINE

## 2020-10-09 PROCEDURE — 85610 PROTHROMBIN TIME: CPT | Performed by: HOSPITALIST

## 2020-10-09 PROCEDURE — 80048 BASIC METABOLIC PNL TOTAL CA: CPT | Performed by: HOSPITALIST

## 2020-10-09 PROCEDURE — 25010000002 CEFEPIME PER 500 MG: Performed by: INTERNAL MEDICINE

## 2020-10-09 PROCEDURE — 87116 MYCOBACTERIA CULTURE: CPT | Performed by: INTERNAL MEDICINE

## 2020-10-09 PROCEDURE — 0100U HC BIOFIRE FILMARRAY RESP PANEL 2: CPT | Performed by: INTERNAL MEDICINE

## 2020-10-09 PROCEDURE — 25010000002 PROPOFOL 10 MG/ML EMULSION: Performed by: ANESTHESIOLOGY

## 2020-10-09 PROCEDURE — 87077 CULTURE AEROBIC IDENTIFY: CPT | Performed by: INTERNAL MEDICINE

## 2020-10-09 PROCEDURE — 87070 CULTURE OTHR SPECIMN AEROBIC: CPT | Performed by: INTERNAL MEDICINE

## 2020-10-09 PROCEDURE — 0B9M8ZZ DRAINAGE OF BILATERAL LUNGS, VIA NATURAL OR ARTIFICIAL OPENING ENDOSCOPIC: ICD-10-PCS | Performed by: INTERNAL MEDICINE

## 2020-10-09 PROCEDURE — 87102 FUNGUS ISOLATION CULTURE: CPT | Performed by: INTERNAL MEDICINE

## 2020-10-09 PROCEDURE — 63710000001 INSULIN LISPRO (HUMAN) PER 5 UNITS: Performed by: HOSPITALIST

## 2020-10-09 RX ORDER — LIDOCAINE HYDROCHLORIDE 10 MG/ML
INJECTION, SOLUTION EPIDURAL; INFILTRATION; INTRACAUDAL; PERINEURAL AS NEEDED
Status: DISCONTINUED | OUTPATIENT
Start: 2020-10-09 | End: 2020-10-09 | Stop reason: HOSPADM

## 2020-10-09 RX ORDER — SODIUM CHLORIDE 9 MG/ML
1000 INJECTION, SOLUTION INTRAVENOUS CONTINUOUS
Status: ACTIVE | OUTPATIENT
Start: 2020-10-09 | End: 2020-10-11

## 2020-10-09 RX ORDER — LIDOCAINE HYDROCHLORIDE 20 MG/ML
INJECTION, SOLUTION EPIDURAL; INFILTRATION; INTRACAUDAL; PERINEURAL AS NEEDED
Status: DISCONTINUED | OUTPATIENT
Start: 2020-10-09 | End: 2020-10-09 | Stop reason: HOSPADM

## 2020-10-09 RX ORDER — PROPOFOL 10 MG/ML
VIAL (ML) INTRAVENOUS AS NEEDED
Status: DISCONTINUED | OUTPATIENT
Start: 2020-10-09 | End: 2020-10-09 | Stop reason: SURG

## 2020-10-09 RX ORDER — HYDROCODONE BITARTRATE AND ACETAMINOPHEN 5; 325 MG/1; MG/1
1 TABLET ORAL EVERY 6 HOURS PRN
Status: DISCONTINUED | OUTPATIENT
Start: 2020-10-09 | End: 2020-10-14 | Stop reason: HOSPADM

## 2020-10-09 RX ORDER — ACETYLCYSTEINE 200 MG/ML
2 SOLUTION ORAL; RESPIRATORY (INHALATION)
Status: DISCONTINUED | OUTPATIENT
Start: 2020-10-09 | End: 2020-10-14 | Stop reason: HOSPADM

## 2020-10-09 RX ORDER — FENTANYL CITRATE 50 UG/ML
25 INJECTION, SOLUTION INTRAMUSCULAR; INTRAVENOUS ONCE
Status: COMPLETED | OUTPATIENT
Start: 2020-10-09 | End: 2020-10-09

## 2020-10-09 RX ORDER — SODIUM CHLORIDE 0.9 % (FLUSH) 0.9 %
10 SYRINGE (ML) INJECTION AS NEEDED
Status: DISCONTINUED | OUTPATIENT
Start: 2020-10-09 | End: 2020-10-09 | Stop reason: HOSPADM

## 2020-10-09 RX ORDER — LIDOCAINE HYDROCHLORIDE 20 MG/ML
INJECTION, SOLUTION INFILTRATION; PERINEURAL AS NEEDED
Status: DISCONTINUED | OUTPATIENT
Start: 2020-10-09 | End: 2020-10-09 | Stop reason: SURG

## 2020-10-09 RX ORDER — LIDOCAINE HYDROCHLORIDE 10 MG/ML
0.5 INJECTION, SOLUTION INFILTRATION; PERINEURAL ONCE AS NEEDED
Status: DISCONTINUED | OUTPATIENT
Start: 2020-10-09 | End: 2020-10-09 | Stop reason: HOSPADM

## 2020-10-09 RX ORDER — EPHEDRINE SULFATE 50 MG/ML
INJECTION, SOLUTION INTRAVENOUS AS NEEDED
Status: DISCONTINUED | OUTPATIENT
Start: 2020-10-09 | End: 2020-10-09 | Stop reason: SURG

## 2020-10-09 RX ADMIN — FLUTICASONE PROPIONATE 1 SPRAY: 50 SPRAY, METERED NASAL at 21:02

## 2020-10-09 RX ADMIN — OXYBUTYNIN CHLORIDE 10 MG: 10 TABLET, EXTENDED RELEASE ORAL at 21:01

## 2020-10-09 RX ADMIN — PROPOFOL 150 MG: 10 INJECTION, EMULSION INTRAVENOUS at 15:32

## 2020-10-09 RX ADMIN — HYDROCODONE BITARTRATE AND ACETAMINOPHEN 1 TABLET: 5; 325 TABLET ORAL at 01:18

## 2020-10-09 RX ADMIN — Medication 10 ML: at 08:21

## 2020-10-09 RX ADMIN — ACETAMINOPHEN 650 MG: 325 TABLET, FILM COATED ORAL at 21:01

## 2020-10-09 RX ADMIN — PHENYLEPHRINE HYDROCHLORIDE 200 MCG: 10 INJECTION INTRAVENOUS at 15:40

## 2020-10-09 RX ADMIN — TOBRAMYCIN 300 MG: 1.2 INJECTION, POWDER, LYOPHILIZED, FOR SOLUTION INTRAVENOUS at 19:25

## 2020-10-09 RX ADMIN — CEFEPIME HYDROCHLORIDE 2 G: 2 INJECTION, POWDER, FOR SOLUTION INTRAVENOUS at 01:18

## 2020-10-09 RX ADMIN — FENTANYL CITRATE 25 MCG: 50 INJECTION, SOLUTION INTRAMUSCULAR; INTRAVENOUS at 14:50

## 2020-10-09 RX ADMIN — EPHEDRINE SULFATE 10 MG: 50 INJECTION INTRAVENOUS at 15:43

## 2020-10-09 RX ADMIN — CARVEDILOL 3.12 MG: 3.12 TABLET, FILM COATED ORAL at 21:00

## 2020-10-09 RX ADMIN — TOBRAMYCIN 300 MG: 1.2 INJECTION, POWDER, LYOPHILIZED, FOR SOLUTION INTRAVENOUS at 07:43

## 2020-10-09 RX ADMIN — EPHEDRINE SULFATE 10 MG: 50 INJECTION INTRAVENOUS at 15:37

## 2020-10-09 RX ADMIN — CARVEDILOL 3.12 MG: 3.12 TABLET, FILM COATED ORAL at 08:20

## 2020-10-09 RX ADMIN — CEFEPIME HYDROCHLORIDE 2 G: 2 INJECTION, POWDER, FOR SOLUTION INTRAVENOUS at 13:44

## 2020-10-09 RX ADMIN — PROPOFOL 300 MCG/KG/MIN: 10 INJECTION, EMULSION INTRAVENOUS at 15:33

## 2020-10-09 RX ADMIN — FUROSEMIDE 20 MG: 20 TABLET ORAL at 06:35

## 2020-10-09 RX ADMIN — HYDROCODONE BITARTRATE AND ACETAMINOPHEN 1 TABLET: 5; 325 TABLET ORAL at 08:21

## 2020-10-09 RX ADMIN — MONTELUKAST SODIUM 10 MG: 10 TABLET, FILM COATED ORAL at 21:00

## 2020-10-09 RX ADMIN — ACETAMINOPHEN 650 MG: 325 TABLET, FILM COATED ORAL at 05:26

## 2020-10-09 RX ADMIN — INSULIN LISPRO 3 UNITS: 100 INJECTION, SOLUTION INTRAVENOUS; SUBCUTANEOUS at 08:21

## 2020-10-09 RX ADMIN — ASPIRIN 81 MG: 81 TABLET, COATED ORAL at 08:20

## 2020-10-09 RX ADMIN — LOSARTAN POTASSIUM 25 MG: 25 TABLET, FILM COATED ORAL at 08:20

## 2020-10-09 RX ADMIN — IPRATROPIUM BROMIDE AND ALBUTEROL SULFATE 3 ML: 2.5; .5 SOLUTION RESPIRATORY (INHALATION) at 10:53

## 2020-10-09 RX ADMIN — PHENYLEPHRINE HYDROCHLORIDE 200 MCG: 10 INJECTION INTRAVENOUS at 15:43

## 2020-10-09 RX ADMIN — GUAIFENESIN 600 MG: 600 TABLET, EXTENDED RELEASE ORAL at 08:21

## 2020-10-09 RX ADMIN — SODIUM CHLORIDE 4 ML: 7 NEBU SOLN,3 % NEBU at 07:39

## 2020-10-09 RX ADMIN — GLIPIZIDE 2.5 MG: 5 TABLET ORAL at 06:35

## 2020-10-09 RX ADMIN — GUAIFENESIN 600 MG: 600 TABLET, EXTENDED RELEASE ORAL at 21:00

## 2020-10-09 RX ADMIN — OXAZEPAM 10 MG: 10 CAPSULE, GELATIN COATED ORAL at 21:07

## 2020-10-09 RX ADMIN — PROPOFOL 200 MCG/KG/MIN: 10 INJECTION, EMULSION INTRAVENOUS at 15:37

## 2020-10-09 RX ADMIN — IPRATROPIUM BROMIDE AND ALBUTEROL SULFATE 3 ML: 2.5; .5 SOLUTION RESPIRATORY (INHALATION) at 19:24

## 2020-10-09 RX ADMIN — PHENYLEPHRINE HYDROCHLORIDE 200 MCG: 10 INJECTION INTRAVENOUS at 16:03

## 2020-10-09 RX ADMIN — FLUTICASONE PROPIONATE 1 SPRAY: 50 SPRAY, METERED NASAL at 08:22

## 2020-10-09 RX ADMIN — EPHEDRINE SULFATE 10 MG: 50 INJECTION INTRAVENOUS at 16:03

## 2020-10-09 RX ADMIN — IPRATROPIUM BROMIDE AND ALBUTEROL SULFATE 3 ML: 2.5; .5 SOLUTION RESPIRATORY (INHALATION) at 16:46

## 2020-10-09 RX ADMIN — ROSUVASTATIN CALCIUM 20 MG: 20 TABLET, FILM COATED ORAL at 21:00

## 2020-10-09 RX ADMIN — WARFARIN 3 MG: 3 TABLET ORAL at 18:49

## 2020-10-09 RX ADMIN — SODIUM CHLORIDE 1000 ML: 9 INJECTION, SOLUTION INTRAVENOUS at 14:35

## 2020-10-09 RX ADMIN — LIDOCAINE HYDROCHLORIDE 60 MG: 20 INJECTION, SOLUTION INFILTRATION; PERINEURAL at 15:32

## 2020-10-09 RX ADMIN — ACETYLCYSTEINE 2 ML: 200 SOLUTION ORAL; RESPIRATORY (INHALATION) at 19:24

## 2020-10-09 RX ADMIN — ACETYLCYSTEINE 2 ML: 200 SOLUTION ORAL; RESPIRATORY (INHALATION) at 16:50

## 2020-10-09 RX ADMIN — BUDESONIDE AND FORMOTEROL FUMARATE DIHYDRATE 2 PUFF: 160; 4.5 AEROSOL RESPIRATORY (INHALATION) at 19:25

## 2020-10-09 RX ADMIN — PANTOPRAZOLE SODIUM 40 MG: 40 TABLET, DELAYED RELEASE ORAL at 08:21

## 2020-10-09 RX ADMIN — IPRATROPIUM BROMIDE AND ALBUTEROL SULFATE 3 ML: 2.5; .5 SOLUTION RESPIRATORY (INHALATION) at 07:38

## 2020-10-09 NOTE — PROGRESS NOTES
PROGRESS NOTE  Patient Name: Caitlyn Longoria  Age/Sex: 85 y.o. female  : 1934  MRN: [id number]    Date of Admission: 2020  Date of Encounter Visit: 10/09/20   LOS: 10 days   Patient Care Team:  Zenaida Suarez MD as PCP - General (Internal Medicine)  Juno Coburn MD as PCP - Claims Attributed  Pao Levi [facility] as Pharmacist  Alexander Valiente [facility] as Pharmacist (Pharmacy)  Zenaida Suarez MD as Referring Physician (Internal Medicine)  Lucien Larkin MD as Consulting Physician (Hematology and Oncology)    Chief Complaint: Recurrent mucous plugging    Hospital course: Patient had heavy growth of Pseudomonas on prior sputum culture, had evidence of pneumonia on the CAT scan.  She had a bronchoscopy with a lot of mucous plug and thick mucus retrieved but that one did not show any heavy growth.  Patient is on nebulized tobramycin and she was restarted on IV cefepime after the bronchoscopy finding.  She did well for couple of days and then had another episodes 2 days ago that was managed with breathing treatment, but she feels like she is still in up with thick mucus again, she had one episode last night and this morning she is more congested, she is having louder wheezing, and she can feel that the mucus is so thick that she cannot cough or expectorate it despite the hypertonic saline and the Aerobika and the other measures on board.  Cannot use the percussion vest because of the vertebral acute fracture and the pain associated with it    Interval History: Patient is afebrile, her x-ray was looking better however I think we are dealing with mucopurulent bronchitis with reaccumulation of mucous plugs, this is made worse by her left hemidiaphragmatic problem that is making the ventilation of the left lung and the ability to expectorate very limited    REVIEW OF SYSTEMS:   CONSTITUTIONAL: no fever or chills  CARDIOVASCULAR: No angina type chest pain, positive for congestion,  "difficulty expectorating, cough, labored breathing chest pain from the vertebral body fracture.   RESPIRATORY: Shortness of breath  GASTROINTESTINAL: No anorexia, nausea, vomiting or diarrhea. No abdominal pain or blood.   HEMATOLOGIC: No bleeding or bruising.     Ventilator/Non-Invasive Ventilation Settings (From admission, onward)    None            Vital Signs  Temp:  [97.6 °F (36.4 °C)-98.3 °F (36.8 °C)] 98.3 °F (36.8 °C)  Heart Rate:  [79-91] 80  Resp:  [16-28] 16  BP: (109-128)/(67-82) 128/82  SpO2:  [90 %-97 %] 94 %  on  Flow (L/min):  [2-3] 3 Device (Oxygen Therapy): humidified;nasal cannula    Intake/Output Summary (Last 24 hours) at 10/9/2020 1007  Last data filed at 10/9/2020 0635  Gross per 24 hour   Intake 480 ml   Output 1250 ml   Net -770 ml     Flowsheet Rows      First Filed Value   Admission Height  157.5 cm (62\") Documented at 09/29/2020 2223   Admission Weight  95.7 kg (211 lb) Documented at 09/29/2020 2223        Body mass index is 38.58 kg/m².      09/29/20  2223   Weight: 95.7 kg (211 lb)       Physical Exam:  GEN:  No acute distress, alert, cooperative, well developed, on nasal cannula oxygen  EYES:   Sclerae clear. No icterus. PERRL. Normal EOM  ENT:   External ears/nose normal, no oral lesions, no thrush, mucous membranes slightly dry  NECK:  Supple, midline trachea, no JVD  LUNGS: Normal chest on inspection, very coarse rhonchi, no crackles, decreased breath sounds on the left .   CV:  Regular rhythm and rate. Normal S1/S2. No murmurs, gallops, or rubs noted.  ABD:  Soft, nontender and nondistended. Normal bowel sounds. No guarding  EXT:  Moves all extremities well. No cyanosis. No redness.  Positive edema.   Skin: Dry, intact, no bleeding    Results Review:    Results From Last 14 Days   Lab Units 09/29/20  1755   D DIMER QUANT MCGFEU/mL 0.66*   LACTATE mmol/L 1.0     Results from last 7 days   Lab Units 10/09/20  0548 10/08/20  0734 10/07/20  0755 10/06/20  0710 10/05/20  0513 " 10/04/20  0619 10/03/20  0830   SODIUM mmol/L 138 137 135* 139 136 133* 140   POTASSIUM mmol/L 4.0 4.2 4.0 3.9 3.5 3.8 4.1   CHLORIDE mmol/L 97* 97* 97* 99 99 96* 100   CO2 mmol/L 29.7* 28.4 30.9* 30.8* 26.5 30.5* 29.6*   BUN mg/dL 23 21 17 16 18 20 24*   CREATININE mg/dL 1.03* 0.85 0.83 0.78 0.85 0.80 0.90   CALCIUM mg/dL 8.9 8.5* 8.2* 8.4* 8.5* 8.3* 8.6   ANION GAP mmol/L 11.3 11.6 7.1 9.2 10.5 6.5 10.4                 Results from last 7 days   Lab Units 10/09/20  0548 10/08/20  0734 10/07/20  0755 10/06/20  0710 10/05/20  0513 10/04/20  0619 10/03/20  0830   WBC 10*3/mm3 20.49* 16.55* 14.07* 16.15* 15.53* 15.08* 19.64*   HEMOGLOBIN g/dL 11.7* 11.7* 11.6* 11.8* 11.2* 11.2* 12.0   HEMATOCRIT % 35.9 36.0 36.9 36.0 34.3 35.1 39.1   PLATELETS 10*3/mm3 212 189 173 171 150 136* 162   MCV fL 93.7 93.5 95.8 91.8 92.0 96.4 97.5*     Results from last 7 days   Lab Units 10/09/20  0548 10/08/20  0734 10/07/20  0755   INR  2.28* 1.94* 1.67*               Invalid input(s): LDLCALC  Results from last 7 days   Lab Units 10/07/20  1555   PH, ARTERIAL pH units 7.397   PCO2, ARTERIAL mm Hg 46.9*   PO2 ART mm Hg 62.4*   HCO3 ART mmol/L 28.9*         Glucose   Date/Time Value Ref Range Status   10/09/2020 0631 174 (H) 70 - 130 mg/dL Final   10/08/2020 2036 199 (H) 70 - 130 mg/dL Final   10/08/2020 1616 153 (H) 70 - 130 mg/dL Final   10/08/2020 1159 191 (H) 70 - 130 mg/dL Final   10/08/2020 0801 291 (H) 70 - 130 mg/dL Final   10/08/2020 0751 264 (H) 70 - 130 mg/dL Final   10/07/2020 1646 127 70 - 130 mg/dL Final   10/07/2020 1200 127 70 - 130 mg/dL Final         Results from last 7 days   Lab Units 10/06/20  1020   RESPCX  Rare Normal Respiratory Milana: NO S.aureus/MRSA or Pseudomonas aeruginosa                       Imaging:   Imaging Results (All)     Procedure Component Value Units Date/Time       I reviewed the patient's new clinical results.  I personally viewed and interpreted the patient's imaging results: Improved aeration of  the left lower lobe with persistent elevation of the left hemidiaphragm, known pre-existing condition        Medication Review:   aspirin, 81 mg, Oral, Daily  budesonide-formoterol, 2 puff, Inhalation, BID - RT  carvedilol, 3.125 mg, Oral, BID  cefepime, 2 g, Intravenous, Q12H  fluticasone, 1 spray, Nasal, BID  furosemide, 20 mg, Oral, QAM  glipizide, 2.5 mg, Oral, QAM  guaiFENesin, 600 mg, Oral, Q12H  insulin lispro, 0-14 Units, Subcutaneous, TID AC  ipratropium-albuterol, 3 mL, Nebulization, 4x Daily - RT  losartan, 25 mg, Oral, Daily  montelukast, 10 mg, Oral, Nightly  oxazepam, 10 mg, Oral, Nightly  oxybutynin XL, 10 mg, Oral, Nightly  pantoprazole, 40 mg, Oral, Daily  rosuvastatin, 20 mg, Oral, Nightly  sodium chloride, 4 mL, Nebulization, TID  tobramycin, 300 mg, Nebulization, BID - RT  warfarin (COUMADIN) (dosing per levels), , Does not apply, Daily  warfarin, 3 mg, Oral, Daily        Pharmacy to dose warfarin,         ASSESSMENT:   1. COPD/asthma with mild exacerbation  2. Right lower lobe pneumonia with heavy growth of Pseudomonas on cultures  3. Chronic bronchitis  4. Acute hypoxemic respiratory failure  5. Elevated left hemidiaphragm: Chronic  6. Ischemic cardiomyopathy  7. Diastolic dysfunction  8. Mitral regurgitation  9. Atrial fibrillation and sick sinus syndrome  10. CLL  11. 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 Hypertension  13. Diabetes  14. CHARLENE on BiPAP  15. Immunodeficiency    PLAN:  Patient will be set up for another bronchoscopy today  She has been n.p.o. should be safe to proceed  Her INR is 2.28, that should not be an issue since we are only going in for washing and clearing the secretion from the large airways with no biopsy or brushes or any other sharp objects to be used.  That would make the risk of bleeding very minimal  Discussed with the patient, discussed with the endoscopy team, waiting on the timing of the bronchoscopy sometime today       Disposition: Home    Juno Coburn,  MD  10/09/20  10:07 EDT            Dictated utilizing Dragon dictation

## 2020-10-09 NOTE — PROGRESS NOTES
"DAILY PROGRESS NOTE  [facility]    Patient Identification:  Name: Caitlyn Longoria  Age: 85 y.o.  Sex: female  :  1934  MRN: [id number]         Primary Care Physician: Zenaida Suarez MD    Subjective:  Interval History:Short of air.  Worse today with exertion.  Back pain    Objective:    Scheduled Meds:aspirin, 81 mg, Oral, Daily  budesonide-formoterol, 2 puff, Inhalation, BID - RT  carvedilol, 3.125 mg, Oral, BID  cefepime, 2 g, Intravenous, Q12H  fluticasone, 1 spray, Nasal, BID  furosemide, 20 mg, Oral, QAM  glipizide, 2.5 mg, Oral, QAM  guaiFENesin, 600 mg, Oral, Q12H  insulin lispro, 0-14 Units, Subcutaneous, TID AC  ipratropium-albuterol, 3 mL, Nebulization, 4x Daily - RT  losartan, 25 mg, Oral, Daily  montelukast, 10 mg, Oral, Nightly  oxazepam, 10 mg, Oral, Nightly  oxybutynin XL, 10 mg, Oral, Nightly  pantoprazole, 40 mg, Oral, Daily  rosuvastatin, 20 mg, Oral, Nightly  sodium chloride, 4 mL, Nebulization, TID  tobramycin, 300 mg, Nebulization, BID - RT  warfarin, 3 mg, Oral, Daily      Continuous Infusions:Pharmacy to dose warfarin,         Vital signs in last 24 hours:  Temp:  [97.6 °F (36.4 °C)-98.4 °F (36.9 °C)] 98.4 °F (36.9 °C)  Heart Rate:  [79-91] 79  Resp:  [16-28] 16  BP: (106-128)/(67-82) 106/70    Intake/Output:    Intake/Output Summary (Last 24 hours) at 10/9/2020 1227  Last data filed at 10/9/2020 0635  Gross per 24 hour   Intake 480 ml   Output 1250 ml   Net -770 ml       Exam:  /70 (BP Location: Left arm, Patient Position: Sitting)   Pulse 79   Temp 98.4 °F (36.9 °C) (Oral)   Resp 16   Ht 157.5 cm (62.01\")   Wt 95.7 kg (211 lb)   SpO2 91%   BMI 38.58 kg/m²     General Appearance:    Alert, cooperative, no distress   Head:    Normocephalic, without obvious abnormality, atraumatic   Eyes:       Throat:   Lips, tongue, gums normal   Neck:   Supple, symmetrical, trachea midline, no JVD   Lungs:     Rhonchi and wheezes. bilaterally, respirations " unlabored   Chest Wall:    No tenderness or deformity    Heart:    Regular rate and rhythm, S1 and S2 normal, no murmur,no  Rub or gallop   Abdomen:     Soft, nontender, bowel sounds active, no masses, no organomegaly    Extremities:   Extremities normal, atraumatic, no cyanosis or edema   Pulses:      Skin:   Skin is warm and dry,  no rashes or palpable lesions   Neurologic:   no focal deficits noted      Lab Results (last 72 hours)     Procedure Component Value Units Date/Time    POC Glucose Once [[id number]]  (Abnormal) Collected: 09/30/20 1557    Specimen: Blood Updated: 09/30/20 1559     Glucose 358 mg/dL     POC Glucose Once [[id number]]  (Abnormal) Collected: 09/30/20 1158    Specimen: Blood Updated: 09/30/20 1205     Glucose 366 mg/dL     COVID PRE-OP / PRE-PROCEDURE SCREENING ORDER (NO ISOLATION) - Swab, Nasopharynx [[id number]] Collected: 09/29/20 1819    Specimen: Swab from Nasopharynx Updated: 09/30/20 1135    Narrative:      The following orders were created for panel order COVID PRE-OP / PRE-PROCEDURE SCREENING ORDER (NO ISOLATION) - Swab, Nasopharynx.  Procedure                               Abnormality         Status                     ---------                               -----------         ------                     COVID-19,BIOTAP, NP/OP S...[[id number]]                      Final result                 Please view results for these tests on the individual orders.    COVID-19,BIOTAP, NP/OP SWAB IN TRANSPORT MEDIA OR SALINE 24-36 HR TAT - Swab, Nasopharynx [[id number]] Collected: 09/29/20 1819    Specimen: Swab from Nasopharynx Updated: 09/30/20 1135     SARS-CoV-2 PCR Not Detected     Comment: Nucleic acid specific to SARS-CoV-2 (COVID-19) virus was not detected inthis sample by the TaqPath (TM) COVID-19 Combo Kit.SARS-CoV-2 (COVID-19) nucleic acid testing performed using [employer] Aptima (R) SARS-CoV-2 Assay or [employer] TaqPath   (TM)COVID-19 Combo Kit as indicated above under  "Emergency Use Authorization (EUA) from the FDA. Aptima (R) and TaqPath (TM) test performancecharacteristics verified by [facility] in accordance with the FDAs Guidance Document (Policy for Diagnostic   Tests for Coronavirus Disease-2019during the Public Health Emergency) issued on March 16, 2020. The laboratory is regulated under CLIA as qualified to perform high-complexity testingUnless otherwise noted, all testing was performed at [facility],   CLIA No. [id number], [place] State Licensee No. [id number]       Respiratory Culture - Sputum, Cough [[id number]] Collected: 09/30/20 1114    Specimen: Sputum from Cough Updated: 09/30/20 1123    Procalcitonin [[id number]]  (Normal) Collected: 09/30/20 0702    Specimen: Blood from Hand, Left Updated: 09/30/20 0810     Procalcitonin 0.06 ng/mL     Narrative:      As a Marker for Sepsis (Non-Neonates):   1. <0.5 ng/mL represents a low risk of severe sepsis and/or septic shock.  1. >2 ng/mL represents a high risk of severe sepsis and/or septic shock.    As a Marker for Lower Respiratory Tract Infections that require antibiotic therapy:  PCT on Admission     Antibiotic Therapy             6-12 Hrs later  > 0.5                Strongly Recommended            >0.25 - <0.5         Recommended  0.1 - 0.25           Discouraged                   Remeasure/reassess PCT  <0.1                 Strongly Discouraged          Remeasure/reassess PCT      As 28 day mortality risk marker: \"Change in Procalcitonin Result\" (> 80 % or <=80 %) if Day 0 (or Day 1) and Day 4 values are available. Refer to http://www.[employer]-pct-calculator.com/   Change in PCT <=80 %   A decrease of PCT levels below or equal to 80 % defines a positive change in PCT test result representing a higher risk for 28-day all-cause mortality of patients diagnosed with severe sepsis or septic shock.  Change in PCT > 80 %   A decrease of PCT levels of more than 80 % defines a negative change in PCT result representing a lower risk " "for 28-day all-cause mortality of patients diagnosed with severe sepsis or septic shock.                Results may be falsely decreased if patient taking Biotin.     TSH [[id number]]  (Normal) Collected: 09/30/20 0702    Specimen: Blood from Hand, Left Updated: 09/30/20 0805     TSH 0.801 uIU/mL     Basic Metabolic Panel [[id number]]  (Abnormal) Collected: 09/30/20 0702    Specimen: Blood from Hand, Left Updated: 09/30/20 0803     Glucose 235 mg/dL      BUN 16 mg/dL      Creatinine 0.84 mg/dL      Sodium 142 mmol/L      Potassium 4.1 mmol/L      Chloride 108 mmol/L      CO2 21.9 mmol/L      Calcium 8.3 mg/dL      eGFR Non African Amer 64 mL/min/1.73      BUN/Creatinine Ratio 19.0     Anion Gap 12.1 mmol/L     Narrative:      GFR Normal >60  Chronic Kidney Disease <60  Kidney Failure <15      Protime-INR [[id number]]  (Abnormal) Collected: 09/30/20 0702    Specimen: Blood from Hand, Left Updated: 09/30/20 0741     Protime 35.9 Seconds      INR 3.78    Procalcitonin [[id number]]  (Normal) Collected: 09/29/20 2004    Specimen: Blood Updated: 09/30/20 0024     Procalcitonin 0.05 ng/mL     Narrative:      As a Marker for Sepsis (Non-Neonates):   1. <0.5 ng/mL represents a low risk of severe sepsis and/or septic shock.  1. >2 ng/mL represents a high risk of severe sepsis and/or septic shock.    As a Marker for Lower Respiratory Tract Infections that require antibiotic therapy:  PCT on Admission     Antibiotic Therapy             6-12 Hrs later  > 0.5                Strongly Recommended            >0.25 - <0.5         Recommended  0.1 - 0.25           Discouraged                   Remeasure/reassess PCT  <0.1                 Strongly Discouraged          Remeasure/reassess PCT      As 28 day mortality risk marker: \"Change in Procalcitonin Result\" (> 80 % or <=80 %) if Day 0 (or Day 1) and Day 4 values are available. Refer to http://www.[employer]-pct-calculator.com/   Change in PCT <=80 %   A decrease of PCT levels below or equal " to 80 % defines a positive change in PCT test result representing a higher risk for 28-day all-cause mortality of patients diagnosed with severe sepsis or septic shock.  Change in PCT > 80 %   A decrease of PCT levels of more than 80 % defines a negative change in PCT result representing a lower risk for 28-day all-cause mortality of patients diagnosed with severe sepsis or septic shock.                Results may be falsely decreased if patient taking Biotin.     POC Glucose Once [[id number]]  (Normal) Collected: 09/30/20 0006    Specimen: Blood Updated: 09/30/20 0007     Glucose 118 mg/dL     Troponin [[id number]]  (Normal) Collected: 09/29/20 2004    Specimen: Blood Updated: 09/29/20 2041     Troponin T <0.010 ng/mL     Narrative:      Troponin T Reference Range:  <= 0.03 ng/mL-   Negative for AMI  >0.03 ng/mL-     Abnormal for myocardial necrosis.  Clinicians would have to utilize clinical acumen, EKG, Troponin and serial changes to determine if it is an Acute Myocardial Infarction or myocardial injury due to an underlying chronic condition.       Results may be falsely decreased if patient taking Biotin.      Blood Culture - Blood, Arm, Left [[id number]] Collected: 09/29/20 2004    Specimen: Blood from Arm, Left Updated: 09/29/20 2012    CBC & Differential [[id number]]  (Abnormal) Collected: 09/29/20 1755    Specimen: Blood Updated: 09/29/20 1928    Narrative:      The following orders were created for panel order CBC & Differential.  Procedure                               Abnormality         Status                     ---------                               -----------         ------                     CBC Auto Differential[[id number]]        Abnormal            Final result                 Please view results for these tests on the individual orders.    CBC Auto Differential [[id number]]  (Abnormal) Collected: 09/29/20 1755    Specimen: Blood Updated: 09/29/20 1928     WBC 24.44 10*3/mm3      RBC 4.22 10*6/mm3         Hemoglobin 12.7 g/dL      Hematocrit 39.2 %      MCV 92.9 fL      MCH 30.1 pg      MCHC 32.4 g/dL      RDW 12.7 %      RDW-SD 42.6 fl      MPV 10.0 fL      Platelets 158 10*3/mm3     Manual Differential [[id number]]  (Abnormal) Collected: 09/29/20 1755    Specimen: Blood Updated: 09/29/20 1928     Neutrophil % 28.0 %      Lymphocyte % 70.0 %      Monocyte % 1.0 %      Basophil % 1.0 %      Neutrophils Absolute 6.84 10*3/mm3      Lymphocytes Absolute 17.11 10*3/mm3      Monocytes Absolute 0.24 10*3/mm3      Basophils Absolute 0.24 10*3/mm3      RBC Morphology Normal     Smudge Cells Mod/2+     Platelet Morphology Normal    BNP [[id number]]  (Normal) Collected: 09/29/20 1755    Specimen: Blood Updated: 09/29/20 1847     proBNP 535.5 pg/mL     Narrative:      Among patients with dyspnea, NT-proBNP is highly sensitive for the detection of acute congestive heart failure. In addition NT-proBNP of <300 pg/ml effectively rules out acute congestive heart failure with 99% negative predictive value.    Results may be falsely decreased if patient taking Biotin.      Troponin [[id number]]  (Normal) Collected: 09/29/20 1755    Specimen: Blood Updated: 09/29/20 1847     Troponin T <0.010 ng/mL     Narrative:      Troponin T Reference Range:  <= 0.03 ng/mL-   Negative for AMI  >0.03 ng/mL-     Abnormal for myocardial necrosis.  Clinicians would have to utilize clinical acumen, EKG, Troponin and serial changes to determine if it is an Acute Myocardial Infarction or myocardial injury due to an underlying chronic condition.       Results may be falsely decreased if patient taking Biotin.      Comprehensive Metabolic Panel [[id number]]  (Abnormal) Collected: 09/29/20 1755    Specimen: Blood Updated: 09/29/20 1843     Glucose 131 mg/dL      BUN 16 mg/dL      Creatinine 1.00 mg/dL      Sodium 141 mmol/L      Potassium 3.2 mmol/L      Chloride 105 mmol/L      CO2 22.9 mmol/L      Calcium 9.1 mg/dL      Total Protein 6.4 g/dL      Albumin  3.90 g/dL      ALT (SGPT) 23 U/L      AST (SGOT) 17 U/L      Alkaline Phosphatase 105 U/L      Total Bilirubin 0.8 mg/dL      eGFR Non African Amer 53 mL/min/1.73      Globulin 2.5 gm/dL      A/G Ratio 1.6 g/dL      BUN/Creatinine Ratio 16.0     Anion Gap 13.1 mmol/L     Narrative:      GFR Normal >60  Chronic Kidney Disease <60  Kidney Failure <15      Lactic Acid, Plasma [[id number]]  (Normal) Collected: 09/29/20 1755    Specimen: Blood Updated: 09/29/20 1831     Lactate 1.0 mmol/L     D-dimer, Quantitative [[id number]]  (Abnormal) Collected: 09/29/20 1755    Specimen: Blood Updated: 09/29/20 1829     D-Dimer, Quantitative 0.66 MCGFEU/mL     Narrative:      The Stago D-Dimer test used in conjunction with a clinical pretest probability (PTP) assessment model, has been approved by the FDA to rule out the presence of venous thromboembolism (VTE) in outpatients suspected of deep venous thrombosis (DVT) or pulmonary embolism (PE). The cut-off for negative predictive value is <0.50 MCGFEU/mL.    Protime-INR [[id number]]  (Abnormal) Collected: 09/29/20 1755    Specimen: Blood Updated: 09/29/20 1829     Protime 33.2 Seconds      INR 3.41    Blood Culture - Blood, Arm, Left [[id number]] Collected: 09/29/20 1755    Specimen: Blood from Arm, Left Updated: 09/29/20 1812    Blood Gas, Venous [[id number]]  (Abnormal) Collected: 09/29/20 1801    Specimen: Venous Blood Updated: 09/29/20 1804     Site RV     pH, Venous 7.431 pH Units      pCO2, Venous 34.2 mm Hg      pO2, Venous 40.6 mm Hg      HCO3, Venous 22.7 mmol/L      Base Excess, Venous -1.1 mmol/L      O2 Saturation Calculated 77.8 %      Barometric Pressure for Blood Gas 744.9 mmHg      Modality Room Air     Rate 26 Breaths/minute         Data Review:  Results from last 7 days   Lab Units 10/09/20  0548 10/08/20  0734 10/07/20  0755   SODIUM mmol/L 138 137 135*   POTASSIUM mmol/L 4.0 4.2 4.0   CHLORIDE mmol/L 97* 97* 97*   CO2 mmol/L 29.7* 28.4 30.9*   BUN mg/dL 23 21 17      CREATININE mg/dL 1.03* 0.85 0.83   GLUCOSE mg/dL 164* 279* 147*   CALCIUM mg/dL 8.9 8.5* 8.2*     Results from last 7 days   Lab Units 10/09/20  0548 10/08/20  0734 10/07/20  0755   WBC 10*3/mm3 20.49* 16.55* 14.07*   HEMOGLOBIN g/dL 11.7* 11.7* 11.6*   HEMATOCRIT % 35.9 36.0 36.9   PLATELETS 10*3/mm3 212 189 173             Lab Results   Lab Value Date/Time    TROPONINT <0.010 09/29/2020 2004    TROPONINT <0.010 09/29/2020 1755    TROPONINT <0.010 08/29/2020 1036    TROPONINT <0.010 04/28/2020 1421    TROPONINT <0.010 04/23/2020 2143    TROPONINT 0.014 02/11/2020 1748    TROPONINT <0.010 09/16/2019 1207    TROPONINT 0.12 (C) 03/27/2014 0955    TROPONINT 0.14 (C) 03/27/2014 0355    TROPONINT 0.14 (C) 03/26/2014 1755    TROPONINT 0.15 (C) 03/26/2014 0349               Invalid input(s): PROT, LABALBU          Glucose   Date/Time Value Ref Range Status   10/09/2020 1158 121 70 - 130 mg/dL Final   10/09/2020 0631 174 (H) 70 - 130 mg/dL Final   10/08/2020 2036 199 (H) 70 - 130 mg/dL Final   10/08/2020 1616 153 (H) 70 - 130 mg/dL Final   10/08/2020 1159 191 (H) 70 - 130 mg/dL Final   10/08/2020 0801 291 (H) 70 - 130 mg/dL Final   10/08/2020 0751 264 (H) 70 - 130 mg/dL Final   10/07/2020 1646 127 70 - 130 mg/dL Final     Results from last 7 days   Lab Units 10/09/20  0548 10/08/20  0734 10/07/20  0755   INR  2.28* 1.94* 1.67*       Past Medical History:   Diagnosis Date   • Aortic calcification (CMS/HCC)     mild, on echo 12/17/2017   • Aortic regurgitation     Trace   • Asthma    • Atrial fibrillation (CMS/[facility])    • CAD (coronary artery disease)    • Chronic combined systolic and diastolic congestive heart failure (CMS/[facility])    • CKD (chronic kidney disease) stage 3, GFR 30-59 ml/min    • Coronary artery disease involving native coronary artery of native heart with angina pectoris (CMS/[facility])    • Disc degeneration, lumbar    • DM type 2 (diabetes mellitus, type 2) (CMS/[facility])    • GERD (gastroesophageal reflux disease)    •  History of aneurysm     right femoral artery s/p LHC   • History of blood transfusion    • History of fracture    • History of vitamin D deficiency    • Hyperlipidemia    • Hypertension    • Mild mitral regurgitation    • Mitral annular calcification     12/8/2017- echo, moderate   • PAF (paroxysmal atrial fibrillation) (CMS/[facility])    • Peripheral neuropathy    • Skin cancer     Left hand   • Sleep apnea    • SSS (sick sinus syndrome) (CMS/[facility])    • Stroke (cerebrum) (CMS/[facility])    • Tricuspid regurgitation     Trace       Assessment:  Active Hospital Problems    Diagnosis  POA   • **Chronic bronchitis (CMS/[facility]) [J42]  Yes   • Pneumonia due to gram-negative bacteria (CMS/[facility]) [J15.6]  Unknown   • Closed wedge compression fracture of T5 vertebra (CMS/[facility]) [S22.050A]  Unknown   • Dyspnea [R06.00]  Yes   • Pneumonia due to infectious organism [J18.9]  Unknown   • Anticoagulated on Coumadin [Z79.01]  Not Applicable   • CLL (chronic lymphoid leukemia) in relapse (CMS/[facility]) [C91.92]  Yes   • Rhinovirus [B34.8]  Yes   • Morbid obesity (CMS/[facility]) [E66.01]  Yes   • HTN (hypertension) [I10]  Yes   • CKD (chronic kidney disease), stage III [N18.30]  Yes   • DM II (diabetes mellitus, type II), controlled (CMS/[facility]) [E11.9]  Yes   • Paroxysmal atrial fibrillation (CMS/[facility]) [I48.0]  Yes      Resolved Hospital Problems   No resolved problems to display.       Plan:  Continue the nebs and O 2, await pulmonary. Follow lab.  Antibiotics for PNA.  Back brace. Repeat bronch today  Discussed with nurse and pulmonary.   Saman Cruz MD      10/9/2020  12:27 EDT

## 2020-10-09 NOTE — ANESTHESIA PREPROCEDURE EVALUATION
Anesthesia Evaluation     Patient summary reviewed and Nursing notes reviewed                Airway   Mallampati: III  TM distance: >3 FB  Neck ROM: full  Possible difficult intubation  Dental    (+) poor dentition    Pulmonary    (+) pneumonia , asthma,shortness of breath, sleep apnea, decreased breath sounds,     ROS comment: Acute respiratory failure with hypoxia  Cardiovascular     Rhythm: regular  Rate: normal    (+) pacemaker pacemaker, hypertension 2 medications or greater, valvular problems/murmurs MR, TI and AI, CAD, cardiac stents dysrhythmias Paroxysmal Atrial Fib, Bradycardia, angina, CHF , hyperlipidemia,     ROS comment: SSS  PE comment: NSR/paced    Neuro/Psych  (+) CVA, numbness,       ROS Comment: Peripheral neuropathy/hx CVA  GI/Hepatic/Renal/Endo    (+) obesity, morbid obesity, GERD,  renal disease CRI, diabetes mellitus type 2,     Musculoskeletal     (+) chronic pain, neck pain,   Abdominal   (+) obese,    Substance History      OB/GYN          Other   arthritis, blood dyscrasia,                   Anesthesia Plan    ASA 4     general   (LMA/tolerated lidocaine in past)  intravenous induction     Anesthetic plan, all risks, benefits, and alternatives have been provided, discussed and informed consent has been obtained with: patient.

## 2020-10-09 NOTE — ANESTHESIA POSTPROCEDURE EVALUATION
Patient: Caitlyn Longoria    Procedure Summary     Date: 10/09/20 Room / Location:  [facility]    Anesthesia Start: 1525 Anesthesia Stop: 1604    Procedure: BRONCHOSCOPY with washing (Bilateral Bronchus) Diagnosis:       Pneumonia due to infectious organism, unspecified laterality, unspecified part of lung      (Pneumonia due to infectious organism, unspecified laterality, unspecified part of lung [J18.9])    Surgeon: Juno Coburn MD Provider: Dionicio Gaffney MD    Anesthesia Type: general ASA Status: 4          Anesthesia Type: general    Vitals  Vitals Value Taken Time   BP 87/55 10/09/20 1600   Temp     Pulse 80 10/09/20 1600   Resp 22 10/09/20 1600   SpO2 95 % 10/09/20 1600           Post Anesthesia Care and Evaluation    Patient location during evaluation: PHASE II  Patient participation: complete - patient participated  Level of consciousness: awake and alert  Pain management: adequate  Airway patency: patent  Anesthetic complications: No anesthetic complications  PONV Status: none  Cardiovascular status: acceptable, blood pressure returned to baseline and hemodynamically stable  Respiratory status: acceptable  Hydration status: acceptable    Comments: Pt sounds congested but O2 sat 95-97 on 4L, may be due to hx of  CHF

## 2020-10-09 NOTE — PROGRESS NOTES
"Adult Nutrition  Assessment/PES    Patient Name:  Caitlyn Longoria  YOB: 1934  MRN: [id number]  Admit Date:  9/29/2020    Assessment Date:  10/9/2020    Comments:  Nutrition assessment triggered by LOS x 10 days.  Admitted with PNA.  Patient with compression fracture of T5 vertebra.  Back brace.  Possible plans for repeat bronchoscopy today.    Patient currently NPO.  25-50% at meals prior to NPO status.  RD to follow up as diet is advanced.    Reason for Assessment     Row Name 10/09/20 0907          Reason for Assessment    Reason For Assessment  per organizational policy LOS     Diagnosis  cardiac disease;diabetes diagnosis/complications;renal disease;cancer diagnosis/related complications;other (see comments) DM2, PAF, HTN, CKD3, CLL, wedge compression fracture of T5 vertebra; adm with PNA         Nutrition/Diet History     Row Name 10/09/20 0908          Nutrition/Diet History    Typical Food/Fluid Intake  currently NPO, plans for repeat bronch today         Anthropometrics     Row Name 10/09/20 0908          Anthropometrics    Height  157.5 cm (62.01\")        Admit Weight    Admit Weight  -- 211# 9/29        Ideal Body Weight (IBW)    Ideal Body Weight (IBW) (kg)  50.45        Body Mass Index (BMI)    BMI Assessment  BMI 35-39.9: obesity grade II 38.50         Labs/Tests/Procedures/Meds     Row Name 10/09/20 0909          Labs/Procedures/Meds    Lab Results Reviewed  reviewed, pertinent     Lab Results Comments  Gluc, Creat, GFR, WBC, Hgb        Diagnostic Tests/Procedures    Diagnostic Test/Procedure Reviewed  reviewed, pertinent     Diagnostic Test/Procedures Comments  plans for repeat bronchoscopy today        Medications    Pertinent Medications Reviewed  reviewed, pertinent     Pertinent Medications Comments  lasix, glucotrol, humalog, protonix, crestor, coumadin         Physical Findings     Row Name 10/09/20 0910          Physical Findings    Overall Physical Appearance  obese;on oxygen " "therapy     Skin  edema;other (see comments) B=20, R leg abrasion         Estimated/Assessed Needs     Row Name 10/09/20 0908          Calculation Measurements    Height  157.5 cm (62.01\")         Nutrition Prescription Ordered     Row Name 10/09/20 0911          Nutrition Prescription PO    Current PO Diet  NPO         Evaluation of Received Nutrient/Fluid Intake     Row Name 10/09/20 0911          PO Evaluation    Number of Meals  4     % PO Intake  25-50         Problem/Interventions:  Problem 1     Row Name 10/09/20 0911          Nutrition Diagnoses Problem 1    Problem 1  Inadequate Intake/Infusion     Inadequate Intake Type  Oral     Macronutrient  Kcal;Protein     Etiology (related to)  MNT for Treatment/Condition     Signs/Symptoms (evidenced by)  NPO         Intervention Goal     Row Name 10/09/20 0911          Intervention Goal    General  Maintain nutrition;Disease management/therapy;Meet nutritional needs for age/condition     PO  Initiate feeding     Weight  Appropriate weight loss         Nutrition Intervention     Row Name 10/09/20 0911          Nutrition Intervention    RD/Tech Action  Follow Tx progress;Care plan reviewd;Await begin PO         Education/Evaluation     Row Name 10/09/20 0911          Education    Education  Will Instruct as appropriate        Monitor/Evaluation    Monitor  Per protocol;I&O;Pertinent labs;Weight;Skin status;Symptoms           Electronically signed by:  Diana Hayes RD  10/09/20 09:12 EDT  "

## 2020-10-09 NOTE — PLAN OF CARE
Problem: Adult Inpatient Plan of Care  Goal: Plan of Care Review  Outcome: Ongoing, Progressing  Goal: Patient-Specific Goal (Individualized)  Outcome: Ongoing, Progressing  Goal: Absence of Hospital-Acquired Illness or Injury  Outcome: Ongoing, Progressing  Intervention: Identify and Manage Fall Risk  Recent Flowsheet Documentation  Taken 10/8/2020 2015 by Malgorzata Rivera, RN  Safety Promotion/Fall Prevention:   activity supervised   fall prevention program maintained   nonskid shoes/slippers when out of bed  Goal: Optimal Comfort and Wellbeing  Outcome: Ongoing, Progressing  Goal: Readiness for Transition of Care  Outcome: Ongoing, Progressing     Problem: Fall Injury Risk  Goal: Absence of Fall and Fall-Related Injury  Outcome: Ongoing, Progressing  Intervention: Promote Injury-Free Environment  Recent Flowsheet Documentation  Taken 10/8/2020 2015 by Malgorzata Rivera RN  Safety Promotion/Fall Prevention:   activity supervised   fall prevention program maintained   nonskid shoes/slippers when out of bed     Problem: Adjustment to Illness COPD (Chronic Obstructive Pulmonary Disease)  Goal: Optimal Chronic Illness Coping  Outcome: Ongoing, Progressing     Problem: Functional Ability Impaired COPD (Chronic Obstructive Pulmonary Disease)  Goal: Optimal Level of Functional [place]  Outcome: Ongoing, Progressing     Problem: Infection COPD (Chronic Obstructive Pulmonary Disease)  Goal: Absence of Infection Signs and Symptoms  Outcome: Ongoing, Progressing     Problem: Oral Intake Inadequate COPD (Chronic Obstructive Pulmonary Disease)  Goal: Improved Nutrition Intake  Outcome: Ongoing, Progressing     Problem: Respiratory Compromise COPD (Chronic Obstructive Pulmonary Disease)  Goal: Effective Oxygenation and Ventilation  Outcome: Ongoing, Progressing

## 2020-10-09 NOTE — OP NOTE
Bronchoscopy Procedure Note    Procedure:  1. Bronchoscopy, Therapeutic    Pre-Operative Diagnosis: Mucous plug    Post-Operative Diagnosis: Same    Indication: Failure to respond to mucolytic therapy    Anesthesia: Monitored Anesthesia Care (MAC)    Procedure Details: Patient was consented for the procedure with all risks and benefits of the procedure explained in detail.  Patient was given the opportunity to ask questions and all concerns were answered.  The bronchocope was inserted into the main airway via the LMA. An anatomical survey was done of the main airways and the subsegmental bronchus.  The findings are reported above.  A bronchial washing was performed using aliquots of normal saline instilled into the airways then aspirated back.    Findings:  1. Vocal cords were less swollen on today's assessment as compared to 3 days prior  2. The trachea was clean however patient has evidence of thick secretion with the mucus of blood coating several subsegment of the  right lung with mucous plug in the lower main bronchus to the left lung.  3. Patient had washing done from the right lung, though secretions were easy to clean and patient had washing of the terminal lung segment until no more  mucus was obtained on suctioning  4. The left lung was more of a challenge because of the thicker secretion of its mucous plugs but they were washed until we were having clear secretion on suctioning.  5. There was minimal mucosal irritation with minimal bleeding given the fact that the patient is on Coumadin however the bleeding was considered minimal overall  6. Patient tolerated the procedure very well  7. No need for any x-rays since no sharp objects were used  8. Recommendation are to switch from the hypertonic saline to the Mucomyst and continue with the Mucinex and with aggressive pulmonary hygiene measures, with the Aerobika device, could not use the percussion vest because of her vertebral fracture    Estimated Blood  Loss:  Minimal           Specimens:  Washings from both lungs                Complications:  None; patient tolerated the procedure well.           Disposition: PACU - hemodynamically stable.      Patient tolerated the procedure well.    While I was in the room and during my examination of the patient I were gown, gloves, mask, eye protection and washed my hands before and after the encounter.  Proper enhanced droplet precautions precautions and isolation precautions were taken.    Juno Coburn MD  10/9/2020  15:46 EDT

## 2020-10-09 NOTE — PLAN OF CARE
Goal Outcome Evaluation:  Plan of Care Reviewed With: patient, family(niece present)  Progress: improving

## 2020-10-09 NOTE — ANESTHESIA PROCEDURE NOTES
Airway  Urgency: elective    Date/Time: 10/9/2020 3:34 PM  Airway not difficult    General Information and Staff    Patient location during procedure: OR  Anesthesiologist: Dionicio Gaffney MD    Indications and Patient Condition  Indications for airway management: airway protection    Preoxygenated: yes  MILS maintained throughout  Mask difficulty assessment: 1 - vent by mask    Final Airway Details  Final airway type: supraglottic airway      Successful airway: classic and LMA  Size 4    Number of attempts at approach: 1  Assessment: lips, teeth, and gum same as pre-op and atraumatic intubation

## 2020-10-10 LAB
ANION GAP SERPL CALCULATED.3IONS-SCNC: 8.5 MMOL/L (ref 5–15)
BUN SERPL-MCNC: 21 MG/DL (ref 8–23)
BUN/CREAT SERPL: 24.1 (ref 7–25)
CALCIUM SPEC-SCNC: 8.7 MG/DL (ref 8.6–10.5)
CHLORIDE SERPL-SCNC: 97 MMOL/L (ref 98–107)
CO2 SERPL-SCNC: 31.5 MMOL/L (ref 22–29)
CREAT SERPL-MCNC: 0.87 MG/DL (ref 0.57–1)
DEPRECATED RDW RBC AUTO: 44.4 FL (ref 37–54)
ERYTHROCYTE [DISTWIDTH] IN BLOOD BY AUTOMATED COUNT: 12.9 % (ref 12.3–15.4)
GFR SERPL CREATININE-BSD FRML MDRD: 62 ML/MIN/1.73
GLUCOSE BLDC GLUCOMTR-MCNC: 118 MG/DL (ref 70–130)
GLUCOSE BLDC GLUCOMTR-MCNC: 140 MG/DL (ref 70–130)
GLUCOSE BLDC GLUCOMTR-MCNC: 153 MG/DL (ref 70–130)
GLUCOSE BLDC GLUCOMTR-MCNC: 155 MG/DL (ref 70–130)
GLUCOSE SERPL-MCNC: 139 MG/DL (ref 65–99)
HCT VFR BLD AUTO: 35.8 % (ref 34–46.6)
HGB BLD-MCNC: 11.6 G/DL (ref 12–15.9)
INR PPP: 2.95 (ref 0.9–1.1)
LYMPHOCYTES # BLD MANUAL: 8.29 10*3/MM3 (ref 0.7–3.1)
LYMPHOCYTES NFR BLD MANUAL: 2 % (ref 5–12)
LYMPHOCYTES NFR BLD MANUAL: 54 % (ref 19.6–45.3)
MCH RBC QN AUTO: 30.4 PG (ref 26.6–33)
MCHC RBC AUTO-ENTMCNC: 32.4 G/DL (ref 31.5–35.7)
MCV RBC AUTO: 94 FL (ref 79–97)
MONOCYTES # BLD AUTO: 0.31 10*3/MM3 (ref 0.1–0.9)
NEUTROPHILS # BLD AUTO: 6.76 10*3/MM3 (ref 1.7–7)
NEUTROPHILS NFR BLD MANUAL: 44 % (ref 42.7–76)
PLAT MORPH BLD: NORMAL
PLATELET # BLD AUTO: 196 10*3/MM3 (ref 140–450)
PMV BLD AUTO: 9.9 FL (ref 6–12)
POTASSIUM SERPL-SCNC: 3.9 MMOL/L (ref 3.5–5.2)
PROTHROMBIN TIME: 29.7 SECONDS (ref 11.7–14.2)
RBC # BLD AUTO: 3.81 10*6/MM3 (ref 3.77–5.28)
RBC MORPH BLD: NORMAL
SMUDGE CELLS BLD QL SMEAR: ABNORMAL
SODIUM SERPL-SCNC: 137 MMOL/L (ref 136–145)
WBC # BLD AUTO: 15.36 10*3/MM3 (ref 3.4–10.8)

## 2020-10-10 PROCEDURE — 82962 GLUCOSE BLOOD TEST: CPT

## 2020-10-10 PROCEDURE — 94799 UNLISTED PULMONARY SVC/PX: CPT

## 2020-10-10 PROCEDURE — 63710000001 INSULIN LISPRO (HUMAN) PER 5 UNITS: Performed by: HOSPITALIST

## 2020-10-10 PROCEDURE — 85610 PROTHROMBIN TIME: CPT | Performed by: HOSPITALIST

## 2020-10-10 PROCEDURE — 85007 BL SMEAR W/DIFF WBC COUNT: CPT | Performed by: HOSPITALIST

## 2020-10-10 PROCEDURE — 25010000002 CEFEPIME PER 500 MG: Performed by: INTERNAL MEDICINE

## 2020-10-10 PROCEDURE — 85025 COMPLETE CBC W/AUTO DIFF WBC: CPT | Performed by: HOSPITALIST

## 2020-10-10 PROCEDURE — 80048 BASIC METABOLIC PNL TOTAL CA: CPT | Performed by: HOSPITALIST

## 2020-10-10 PROCEDURE — 63710000001 TOBRAMYCIN PER 300 MG: Performed by: INTERNAL MEDICINE

## 2020-10-10 RX ORDER — WARFARIN SODIUM 1 MG/1
1 TABLET ORAL
Status: DISCONTINUED | OUTPATIENT
Start: 2020-10-10 | End: 2020-10-12

## 2020-10-10 RX ADMIN — IPRATROPIUM BROMIDE AND ALBUTEROL SULFATE 3 ML: 2.5; .5 SOLUTION RESPIRATORY (INHALATION) at 21:16

## 2020-10-10 RX ADMIN — CEFEPIME HYDROCHLORIDE 2 G: 2 INJECTION, POWDER, FOR SOLUTION INTRAVENOUS at 01:38

## 2020-10-10 RX ADMIN — IPRATROPIUM BROMIDE AND ALBUTEROL SULFATE 3 ML: 2.5; .5 SOLUTION RESPIRATORY (INHALATION) at 16:21

## 2020-10-10 RX ADMIN — WARFARIN 1 MG: 1 TABLET ORAL at 18:03

## 2020-10-10 RX ADMIN — MONTELUKAST SODIUM 10 MG: 10 TABLET, FILM COATED ORAL at 22:11

## 2020-10-10 RX ADMIN — CEFEPIME HYDROCHLORIDE 2 G: 2 INJECTION, POWDER, FOR SOLUTION INTRAVENOUS at 14:55

## 2020-10-10 RX ADMIN — FLUTICASONE PROPIONATE 1 SPRAY: 50 SPRAY, METERED NASAL at 09:35

## 2020-10-10 RX ADMIN — HYDROCODONE BITARTRATE AND ACETAMINOPHEN 1 TABLET: 5; 325 TABLET ORAL at 12:47

## 2020-10-10 RX ADMIN — ASPIRIN 81 MG: 81 TABLET, COATED ORAL at 09:34

## 2020-10-10 RX ADMIN — HYDROCODONE BITARTRATE AND ACETAMINOPHEN 1 TABLET: 5; 325 TABLET ORAL at 00:15

## 2020-10-10 RX ADMIN — IPRATROPIUM BROMIDE AND ALBUTEROL SULFATE 3 ML: 2.5; .5 SOLUTION RESPIRATORY (INHALATION) at 11:32

## 2020-10-10 RX ADMIN — ACETYLCYSTEINE 2 ML: 200 SOLUTION ORAL; RESPIRATORY (INHALATION) at 21:16

## 2020-10-10 RX ADMIN — GLIPIZIDE 2.5 MG: 5 TABLET ORAL at 09:34

## 2020-10-10 RX ADMIN — CARVEDILOL 3.12 MG: 3.12 TABLET, FILM COATED ORAL at 09:34

## 2020-10-10 RX ADMIN — ACETYLCYSTEINE 2 ML: 200 SOLUTION ORAL; RESPIRATORY (INHALATION) at 11:33

## 2020-10-10 RX ADMIN — ACETYLCYSTEINE 2 ML: 200 SOLUTION ORAL; RESPIRATORY (INHALATION) at 08:13

## 2020-10-10 RX ADMIN — FLUTICASONE PROPIONATE 1 SPRAY: 50 SPRAY, METERED NASAL at 22:12

## 2020-10-10 RX ADMIN — OXYBUTYNIN CHLORIDE 10 MG: 10 TABLET, EXTENDED RELEASE ORAL at 22:11

## 2020-10-10 RX ADMIN — ROSUVASTATIN CALCIUM 20 MG: 20 TABLET, FILM COATED ORAL at 22:11

## 2020-10-10 RX ADMIN — TOBRAMYCIN 300 MG: 1.2 INJECTION, POWDER, LYOPHILIZED, FOR SOLUTION INTRAVENOUS at 08:22

## 2020-10-10 RX ADMIN — CARVEDILOL 3.12 MG: 3.12 TABLET, FILM COATED ORAL at 22:11

## 2020-10-10 RX ADMIN — IPRATROPIUM BROMIDE AND ALBUTEROL SULFATE 3 ML: 2.5; .5 SOLUTION RESPIRATORY (INHALATION) at 08:11

## 2020-10-10 RX ADMIN — FUROSEMIDE 20 MG: 20 TABLET ORAL at 06:22

## 2020-10-10 RX ADMIN — GUAIFENESIN 600 MG: 600 TABLET, EXTENDED RELEASE ORAL at 22:11

## 2020-10-10 RX ADMIN — HYDROCODONE BITARTRATE AND ACETAMINOPHEN 1 TABLET: 5; 325 TABLET ORAL at 18:38

## 2020-10-10 RX ADMIN — ACETYLCYSTEINE 2 ML: 200 SOLUTION ORAL; RESPIRATORY (INHALATION) at 16:21

## 2020-10-10 RX ADMIN — OXAZEPAM 10 MG: 10 CAPSULE, GELATIN COATED ORAL at 22:12

## 2020-10-10 RX ADMIN — PANTOPRAZOLE SODIUM 40 MG: 40 TABLET, DELAYED RELEASE ORAL at 09:35

## 2020-10-10 RX ADMIN — INSULIN LISPRO 3 UNITS: 100 INJECTION, SOLUTION INTRAVENOUS; SUBCUTANEOUS at 12:44

## 2020-10-10 RX ADMIN — HYDROCODONE BITARTRATE AND ACETAMINOPHEN 1 TABLET: 5; 325 TABLET ORAL at 06:22

## 2020-10-10 RX ADMIN — LOSARTAN POTASSIUM 25 MG: 25 TABLET, FILM COATED ORAL at 09:34

## 2020-10-10 RX ADMIN — ACETAMINOPHEN 650 MG: 325 TABLET, FILM COATED ORAL at 22:11

## 2020-10-10 RX ADMIN — TOBRAMYCIN 300 MG: 1.2 INJECTION, POWDER, LYOPHILIZED, FOR SOLUTION INTRAVENOUS at 21:27

## 2020-10-10 RX ADMIN — GUAIFENESIN 600 MG: 600 TABLET, EXTENDED RELEASE ORAL at 09:34

## 2020-10-10 NOTE — PLAN OF CARE
Problem: Adult Inpatient Plan of Care  Goal: Plan of Care Review  Outcome: Ongoing, Progressing  Flowsheets (Taken 10/10/2020 0252)  Progress: improving  Plan of Care Reviewed With: patient  Outcome Summary: Monitor pain,labs,and vitals. Pain controlled with PRN medications per orders. SOB with minimal exertion. Encouraged pulmonary hygiene. 3L O2 NC. Will continue to monitor.  Goal: Absence of Hospital-Acquired Illness or Injury  Intervention: Identify and Manage Fall Risk  Recent Flowsheet Documentation  Taken 10/10/2020 0118 by Symone Hussein RN  Safety Promotion/Fall Prevention:   activity supervised   assistive device/personal items within reach   clutter free environment maintained   fall prevention program maintained   nonskid shoes/slippers when out of bed   room organization consistent   safety round/check completed  Taken 10/10/2020 0023 by Symone Hussein RN  Safety Promotion/Fall Prevention:   activity supervised   assistive device/personal items within reach   clutter free environment maintained   fall prevention program maintained   nonskid shoes/slippers when out of bed   room organization consistent   safety round/check completed  Taken 10/9/2020 2218 by Symone Hussein RN  Safety Promotion/Fall Prevention:   activity supervised   assistive device/personal items within reach   clutter free environment maintained   fall prevention program maintained   lighting adjusted   room organization consistent   nonskid shoes/slippers when out of bed   safety round/check completed  Taken 10/9/2020 2053 by Symone Hussein, JEANNINE  Safety Promotion/Fall Prevention:   activity supervised   assistive device/personal items within reach   clutter free environment maintained   fall prevention program maintained   lighting adjusted   nonskid shoes/slippers when out of bed   room organization consistent   safety round/check completed  Intervention: Prevent Skin Injury  Recent Flowsheet Documentation  Taken 10/10/2020 0118 by  Symone Hussein RN  Body Position:   supine   position maintained   patient/family refused  Taken 10/9/2020 2053 by Symone Hussein RN  Body Position: (up in chair) other (see comments)  Intervention: Prevent and Manage VTE (venous thromboembolism) Risk  Recent Flowsheet Documentation  Taken 10/10/2020 0118 by Symone Hussein RN  VTE Prevention/Management:   bilateral   dorsiflexion/plantar flexion performed   sequential compression devices off   patient refused intervention  Taken 10/9/2020 2053 by Symone Hussein RN  VTE Prevention/Management:   bilateral   dorsiflexion/plantar flexion performed   sequential compression devices off   patient refused intervention  Intervention: Prevent Infection  Recent Flowsheet Documentation  Taken 10/10/2020 0118 by Symone Hussein RN  Infection Prevention:   personal protective equipment utilized   rest/sleep promoted   single patient room provided   hand hygiene promoted  Taken 10/10/2020 0023 by ySmone Hussein RN  Infection Prevention:   personal protective equipment utilized   rest/sleep promoted   single patient room provided   hand hygiene promoted  Taken 10/9/2020 2218 by Symone Hussein RN  Infection Prevention:   personal protective equipment utilized   single patient room provided   rest/sleep promoted   hand hygiene promoted  Taken 10/9/2020 2053 by Symone Hussein RN  Infection Prevention:   single patient room provided   rest/sleep promoted   personal protective equipment utilized   hand hygiene promoted  Goal: Optimal Comfort and Wellbeing  Intervention: Provide Person-Centered Care  Recent Flowsheet Documentation  Taken 10/10/2020 0118 by Symone Hussein RN  Trust Relationship/Rapport: care explained  Taken 10/9/2020 2053 by Symone Hussein RN  Trust Relationship/Rapport:   care explained   choices provided   emotional support provided   empathic listening provided   questions encouraged   questions answered   reassurance provided   thoughts/feelings acknowledged   Goal  Outcome Evaluation:  Plan of Care Reviewed With: patient  Progress: improving  Outcome Summary: Monitor pain,labs,and vitals. Pain controlled with PRN medications per orders. SOB with minimal exertion. Encouraged pulmonary hygiene. 3L O2 NC. Will continue to monitor.

## 2020-10-10 NOTE — PROGRESS NOTES
Patient given all MN tx's at this time.  Aerobika uitlized independently with good effort.  It is placed on bedside table and patient instructed to use every 2 hours x 10 breaths.  Patient is unable to attempt deep breath and cough stating it hurts her back to cough.

## 2020-10-10 NOTE — PROGRESS NOTES
Pharmacy Consult: Warfarin Dosing/ Monitoring    Caitlyn Longoria is a 85 y.o. female, estimated creatinine clearance is 51 mL/min (by C-G formula based on SCr of 0.87 mg/dL). weighing 95.7 kg (211 lb).    She has a past medical history of Aortic calcification (CMS/[facility]), Aortic regurgitation, Asthma, Atrial fibrillation (CMS/[facility]), CAD (coronary artery disease), Chronic combined systolic and diastolic congestive heart failure (CMS/HCC), CKD (chronic kidney disease) stage 3, GFR 30-59 ml/min, Coronary artery disease involving native coronary artery of native heart with angina pectoris (CMS/[facility]), Disc degeneration, lumbar, DM type 2 (diabetes mellitus, type 2) (CMS/[facility]), GERD (gastroesophageal reflux disease), History of aneurysm, History of blood transfusion, History of fracture, History of vitamin D deficiency, Hyperlipidemia, Hypertension, Mild mitral regurgitation, Mitral annular calcification, PAF (paroxysmal atrial fibrillation) (CMS/[facility]), Peripheral neuropathy, Skin cancer, Sleep apnea, SSS (sick sinus syndrome) (CMS/[facility]), Stroke (cerebrum) (CMS/[facility]), and Tricuspid regurgitation.    Social History     Tobacco Use   • Smoking status: Never Smoker   • Smokeless tobacco: Never Used   • Tobacco comment: caffeine use- soda   Substance Use Topics   • Alcohol use: Not Currently   • Drug use: No       Results from last 7 days   Lab Units 10/10/20  0849 10/09/20  0548 10/08/20  0734 10/07/20  0755 10/06/20  0710 10/05/20  0513 10/04/20  0619   INR  2.95* 2.28* 1.94* 1.67* 1.62* 1.59* 1.70*   HEMOGLOBIN g/dL  --  11.7* 11.7* 11.6* 11.8* 11.2* 11.2*   HEMATOCRIT %  --  35.9 36.0 36.9 36.0 34.3 35.1   PLATELETS 10*3/mm3  --  212 189 173 171 150 136*     Results from last 7 days   Lab Units 10/10/20  0849 10/09/20  0548 10/08/20  0734   SODIUM mmol/L 137 138 137   POTASSIUM mmol/L 3.9 4.0 4.2   CHLORIDE mmol/L 97* 97* 97*   CO2 mmol/L 31.5* 29.7* 28.4   BUN mg/dL 21 23 21   CREATININE mg/dL 0.87 1.03* 0.85   CALCIUM mg/dL  8.7 8.9 8.5*   GLUCOSE mg/dL 139* 164* 279*     Anticoagulation history: h/o atrial fibrillation managed on warfarin through [facility]. As of 9/23 regimen noted as: Warfarin 2mg Su/T/W/Th/Sa and Warfarin 4mg M/F     Hospital Anticoagulation:  Consulting provider: Dr. Yoo  Start date: 9/30  Indication: Atrial Fibrillation  Target INR: 2-3  Expected duration: Indefinite   Bridge Therapy: No                  Date 9/29 9/30  10/1 10/2  10/3  10/4  10/5    INR 3.41 3.78  3.6 2.31  1.98  1.70  1.59   Warfarin dose 0 0 0  2  mg  3mg  3mg  held per md       Date  10/6  10/7  10/8 10/9 10/10   INR  1.62  1.67  1.94 2.28 2.95   Warfarin dose  3 mg  3 mg   3 mg 3 mg       Potential drug interactions:    d/c'ed 10/2  - Cefepime: May enhance the anticoagulant effect of warfarin.   - Rosuvastatin (home med, Moderate): May enhance anticoagulant effect of warfarin  - Pantoprazole (Moderate, home med): May enhance anticoagulant effect of warfarin.  - Acetaminophen (prn) - may result in increased risk of bleeding     -- once dose doxy / ctx on 9/29   - ASA: May enhance the anticoagulant effect of warfarin. (home med, reordered on admission)  - Glipizide: Sulfonylureas may enhance the anticoagulant effect of warfarin. Warfarin may enhance the hypoglycemic effect of Sulfonylureas. (home med, reordered on admission)     Relevant nutrition status: Reg diet; 10/9 intake: breakfast not charted, 0% lunch, 100% dinner    Education complete?/ Date: Not at this time     Assessment/Plan:  1) Reduce warfarin dose to 1 mg PO daily based on upward trend in INR now at upper end of goal range. Will likely adjust dose again based on INR trend tomorrow with AM labs.     2) INR ordered daily with AM labs while inpatient.    Pharmacy will continue to follow until discharge or discontinuation of warfarin.     Rommel Conde, PharmD  10/10/2020

## 2020-10-10 NOTE — PLAN OF CARE
Problem: Adult Inpatient Plan of Care  Goal: Plan of Care Review  Outcome: Ongoing, Progressing  Flowsheets (Taken 10/10/2020 1941)  Progress: improving  Plan of Care Reviewed With: patient  Outcome Summary: VSS. pain controlled with prn pain medication, enocourage activity and pulmonary toliet. attempt to wean off oxygen patient desats with movement. will continue to [facility]  Goal: Patient-Specific Goal (Individualized)  Outcome: Ongoing, Progressing  Goal: Absence of Hospital-Acquired Illness or Injury  Outcome: Ongoing, Progressing  Intervention: Identify and Manage Fall Risk  Recent Flowsheet Documentation  Taken 10/10/2020 1800 by Luciana Álvarez RN  Safety Promotion/Fall Prevention:   activity supervised   clutter free environment maintained  Taken 10/10/2020 1600 by Luciana Álvarez RN  Safety Promotion/Fall Prevention:   activity supervised   assistive device/personal items within reach   fall prevention program maintained   safety round/check completed  Taken 10/10/2020 1400 by Luciana Álvarez RN  Safety Promotion/Fall Prevention:   activity supervised   clutter free environment maintained  Taken 10/10/2020 1200 by Luciana Álvarez RN  Safety Promotion/Fall Prevention:   activity supervised   clutter free environment maintained  Taken 10/10/2020 1000 by Luciana Álvarez RN  Safety Promotion/Fall Prevention:   activity supervised   clutter free environment maintained  Taken 10/10/2020 0800 by Luciana Álvarez RN  Safety Promotion/Fall Prevention:   activity supervised   clutter free environment maintained  Intervention: Prevent and Manage VTE (venous thromboembolism) Risk  Recent Flowsheet Documentation  Taken 10/10/2020 0800 by Luciana Álvarez RN  VTE Prevention/Management:   bilateral   dorsiflexion/plantar flexion performed  Intervention: Prevent Infection  Recent Flowsheet Documentation  Taken 10/10/2020 1800 by Luciana Álvarez RN  Infection Prevention: single patient room provided  Taken  10/10/2020 1400 by Luciana Álvarez RN  Infection Prevention: single patient room provided  Taken 10/10/2020 1200 by Luciana Álvarez RN  Infection Prevention: single patient room provided  Taken 10/10/2020 0800 by Luciana Álvarez RN  Infection Prevention: single patient room provided  Goal: Optimal Comfort and Wellbeing  Outcome: Ongoing, Progressing  Intervention: Provide Person-Centered Care  Recent Flowsheet Documentation  Taken 10/10/2020 0800 by Luciana Álvarez RN  Trust Relationship/Rapport:   care explained   choices provided  Goal: Readiness for Transition of Care  Outcome: Ongoing, Progressing     Problem: Fall Injury Risk  Goal: Absence of Fall and Fall-Related Injury  Outcome: Ongoing, Progressing  Intervention: Identify and Manage Contributors to Fall Injury Risk  Recent Flowsheet Documentation  Taken 10/10/2020 1800 by Luciana Álvarez RN  Medication Review/Management: medications reviewed  Taken 10/10/2020 1600 by Luciana Álvarez RN  Medication Review/Management: medications reviewed  Taken 10/10/2020 1400 by Luciana Álvarez RN  Medication Review/Management: medications reviewed  Taken 10/10/2020 1200 by Luciana Álvarez RN  Medication Review/Management: medications reviewed  Taken 10/10/2020 1000 by Luciana Álvarez RN  Medication Review/Management: medications reviewed  Taken 10/10/2020 0800 by Luciana Álvarez RN  Medication Review/Management: medications reviewed  Intervention: Promote Injury-Free Environment  Recent Flowsheet Documentation  Taken 10/10/2020 1800 by Luciana Álvarez RN  Safety Promotion/Fall Prevention:   activity supervised   clutter free environment maintained  Taken 10/10/2020 1600 by Luciana Álvarez RN  Safety Promotion/Fall Prevention:   activity supervised   assistive device/personal items within reach   fall prevention program maintained   safety round/check completed  Taken 10/10/2020 1400 by Luciana Álvarez RN  Safety Promotion/Fall Prevention:   activity  supervised   clutter free environment maintained  Taken 10/10/2020 1200 by Luciana Álvarez RN  Safety Promotion/Fall Prevention:   activity supervised   clutter free environment maintained  Taken 10/10/2020 1000 by Luciana Álvarez RN  Safety Promotion/Fall Prevention:   activity supervised   clutter free environment maintained  Taken 10/10/2020 0800 by Luciana Álvarez RN  Safety Promotion/Fall Prevention:   activity supervised   clutter free environment maintained     Problem: Adjustment to Illness COPD (Chronic Obstructive Pulmonary Disease)  Goal: Optimal Chronic Illness Coping  Outcome: Ongoing, Progressing  Intervention: Support and Optimize Psychosocial Response  Recent Flowsheet Documentation  Taken 10/10/2020 1400 by Luciana Álvarez RN  Supportive Measures: active listening utilized  Taken 10/10/2020 0800 by Luciana Álvarez RN  Supportive Measures: active listening utilized     Problem: Functional Ability Impaired COPD (Chronic Obstructive Pulmonary Disease)  Goal: Optimal Level of Functional [place]  Outcome: Ongoing, Progressing  Intervention: Optimize Functional Ability  Recent Flowsheet Documentation  Taken 10/10/2020 1800 by Luciana Álvarez RN  Activity Management: up in chair  Taken 10/10/2020 1400 by Luciana Álvarez RN  Activity Management: activity adjusted per tolerance  Taken 10/10/2020 1200 by Luciana Álvarez RN  Activity Management: activity adjusted per tolerance  Taken 10/10/2020 1000 by Luciana Álvarez RN  Activity Management: activity adjusted per tolerance  Taken 10/10/2020 0800 by Luciana Álvarez RN  Activity Management: activity adjusted per tolerance  Environmental Support: calm environment promoted     Problem: Infection COPD (Chronic Obstructive Pulmonary Disease)  Goal: Absence of Infection Signs and Symptoms  Outcome: Ongoing, Progressing  Intervention: Prevent or Manage Infection  Recent Flowsheet Documentation  Taken 10/10/2020 1800 by Luciana Álvarez RN  Isolation  Precautions: droplet precautions maintained  Taken 10/10/2020 1600 by Luciana Álvarez RN  Isolation Precautions: droplet precautions maintained  Taken 10/10/2020 1400 by Luciana Álvarez RN  Isolation Precautions: droplet precautions maintained  Taken 10/10/2020 1200 by Luciana Álvarez RN  Isolation Precautions: droplet precautions maintained  Taken 10/10/2020 1000 by Luciana Álvarez RN  Isolation Precautions: droplet precautions maintained  Taken 10/10/2020 0800 by Luciana Álvarez RN  Isolation Precautions: droplet precautions maintained     Problem: Oral Intake Inadequate COPD (Chronic Obstructive Pulmonary Disease)  Goal: Improved Nutrition Intake  Outcome: Ongoing, Progressing     Problem: Respiratory Compromise COPD (Chronic Obstructive Pulmonary Disease)  Goal: Effective Oxygenation and Ventilation  Outcome: Ongoing, Progressing  Intervention: Promote Airway Secretion Clearance  Recent Flowsheet Documentation  Taken 10/10/2020 1800 by Luciana Álvarez RN  Activity Management: up in chair  Taken 10/10/2020 1400 by Luciana Álvarez RN  Activity Management: activity adjusted per tolerance  Taken 10/10/2020 1200 by Luciana Álvarez RN  Activity Management: activity adjusted per tolerance  Taken 10/10/2020 1000 by Luciana Álvarez RN  Activity Management: activity adjusted per tolerance  Taken 10/10/2020 0800 by Luciana Álvarez RN  Activity Management: activity adjusted per tolerance  Cough And Deep Breathing: done independently per patient   Goal Outcome Evaluation:  Plan of Care Reviewed With: patient  Progress: improving  Outcome Summary: VSS. pain controlled with prn pain medication, enocourage activity and pulmonary toliet. attempt to wean off oxygen patient desats with movement. will continue to [facility]

## 2020-10-10 NOTE — PROGRESS NOTES
"  Daily Progress Note.   [address]  10/10/2020    Patient:  Name:  Caitlyn Longoria  MRN:  [id number]  1934  85 y.o.  female         CC: felt badly    Interval History:  Follow up for pna, mucus plugging.  Not having much cough today  Feels better after bronch yesterday  Using flutter and IS  Says she has just a weak cough  Back pain better today    ROS: No fever, no diarrhea, no chest pain  PMFSSH: no change    Physical Exam:  /79 (BP Location: Left arm, Patient Position: Lying)   Pulse 80   Temp 98.1 °F (36.7 °C) (Oral)   Resp 20   Ht 157.5 cm (62.01\")   Wt 95.7 kg (211 lb)   SpO2 99%   BMI 38.58 kg/m²   Body mass index is 38.58 kg/m².    Intake/Output Summary (Last 24 hours) at 10/10/2020 0935  Last data filed at 10/10/2020 0439  Gross per 24 hour   Intake 1430 ml   Output 1250 ml   Net 180 ml     General appearance: chornicaly ill but non toxic, conversant   Eyes: anicteric sclerae, moist conjunctivae; no lid-lag; PERRLA  HENT: Atraumatic; oropharynx mmm no lesion  Neck: Trachea midline; FROM, supple, no thyromegaly or lymphadenopathy  Lungs: bilateral air entry, no wheeze  No focal crackles, some upper airway congestion rattling through lung fields, with normal respiratory effort and no intercostal retractions  CV: RRR, no rub  Abdomen: Soft, non-tender; no masses or HSM +obese  Extremities: No peripheral edema or extremity lymphadenopathy  Skin: Normal temperature, turgor and texture; no rash, ulcers or subcutaneous nodules multiple areas of ecchymosis  Psych: Appropriate affect, alert and oriented to person, place and time    Data Review:  Notable Labs:  Results from last 7 days   Lab Units 10/10/20  0849 10/09/20  0548 10/08/20  0734 10/07/20  0755 10/06/20  0710 10/05/20  0513 10/04/20  0619   WBC 10*3/mm3 15.36* 20.49* 16.55* 14.07* 16.15* 15.53* 15.08*   HEMOGLOBIN g/dL 11.6* 11.7* 11.7* 11.6* 11.8* 11.2* 11.2*   PLATELETS 10*3/mm3 196 212 189 173 171 150 136*     "     Results from last 7 days   Lab Units 10/09/20  0548 10/08/20  0734 10/07/20  0755 10/06/20  0710 10/05/20  0513 10/04/20  0619   SODIUM mmol/L 138 137 135* 139 136 133*   POTASSIUM mmol/L 4.0 4.2 4.0 3.9 3.5 3.8   CHLORIDE mmol/L 97* 97* 97* 99 99 96*   CO2 mmol/L 29.7* 28.4 30.9* 30.8* 26.5 30.5*   BUN mg/dL 23 21 17 16 18 20   CREATININE mg/dL 1.03* 0.85 0.83 0.78 0.85 0.80   GLUCOSE mg/dL 164* 279* 147* 150* 157* 158*   CALCIUM mg/dL 8.9 8.5* 8.2* 8.4* 8.5* 8.3*   Estimated Creatinine Clearance: 43.1 mL/min (A) (by C-G formula based on SCr of 1.03 mg/dL (H)).    Results from last 7 days   Lab Units 10/09/20  0548 10/08/20  0734 10/07/20  0755   PLATELETS 10*3/mm3 212 189 173       Results from last 7 days   Lab Units 10/07/20  1555   PH, ARTERIAL pH units 7.397   PCO2, ARTERIAL mm Hg 46.9*   PO2 ART mm Hg 62.4*   HCO3 ART mmol/L 28.9*       Imaging:  Reviewed chest images personally from past 3 days    Scheduled meds:    acetylcysteine, 2 mL, Nebulization, 4x Daily - RT  aspirin, 81 mg, Oral, Daily  budesonide-formoterol, 2 puff, Inhalation, BID - RT  carvedilol, 3.125 mg, Oral, BID  cefepime, 2 g, Intravenous, Q12H  fluticasone, 1 spray, Nasal, BID  furosemide, 20 mg, Oral, QAM  glipizide, 2.5 mg, Oral, QAM  guaiFENesin, 600 mg, Oral, Q12H  insulin lispro, 0-14 Units, Subcutaneous, TID AC  ipratropium-albuterol, 3 mL, Nebulization, 4x Daily - RT  losartan, 25 mg, Oral, Daily  montelukast, 10 mg, Oral, Nightly  oxazepam, 10 mg, Oral, Nightly  oxybutynin XL, 10 mg, Oral, Nightly  pantoprazole, 40 mg, Oral, Daily  rosuvastatin, 20 mg, Oral, Nightly  tobramycin, 300 mg, Nebulization, BID - RT  warfarin, 3 mg, Oral, Daily        ASSESSMENT  /  PLAN:  1. COPD/asthma with mild exacerbation  2. Right lower lobe pneumonia with heavy growth of Pseudomonas on cultures  3. Chronic bronchitis  4. Acute hypoxemic respiratory failure  5. Elevated left hemidiaphragm: Chronic  6. Ischemic cardiomyopathy  7. Diastolic  dysfunction  8. Mitral regurgitation  9. Atrial fibrillation and sick sinus syndrome  10. CLL  11. 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 Hypertension  13. Diabetes  14. CHARLENE on BiPAP  15. Immunodeficiency     bronchos on 6th and 9th for mucous plugging.  Concerning given age and need for bronchs for airway clearance 3 days apart.  She is receiving aggressive chest pt mucolytics and still doesn't seem to be able to make much progress however will see if we can maintain over next few days, control pain and continue aggressive chest pt mucolytics and nebs.    Cont abx  Cont chest pt  Encouraged flutter  Cont nebs    All issues new to me today.  Prior hospital course, labs and imaging reviewed.  D/w patients primary care physician coordinating care.          Cam Suraez MD  [facility]  10/10/20  9209m

## 2020-10-10 NOTE — PROGRESS NOTES
"DAILY PROGRESS NOTE  [facility]    Patient Identification:  Name: Caitlyn Longoria  Age: 85 y.o.  Sex: female  :  1934  MRN: [id number]         Primary Care Physician: Zenaida Suarez MD      Subjective  No new complaints.  Still short of breath.  Still with very productive cough which he notes worsens after meals.    Objective:  General Appearance:  In no acute distress and not in pain (Obese, chronically ill-appearing.).    Vital signs: (most recent): Blood pressure 102/54, pulse 79, temperature 98.3 °F (36.8 °C), temperature source Oral, resp. rate 20, height 157.5 cm (62.01\"), weight 95.7 kg (211 lb), SpO2 96 %, not currently breastfeeding.    Lungs:  Normal respiratory rate.  She is not in respiratory distress.  There are rhonchi.  No decreased breath sounds.  (Mildly tachypneic.  Very coarse, very loose rhonchi throughout.)  Heart: Normal rate.  Regular rhythm.    Extremities: There is no dependent edema.    Neurological: Patient is alert and oriented to person, place and time.    Skin:  Warm and dry.                Vital signs in last 24 hours:  Temp:  [98.1 °F (36.7 °C)-98.3 °F (36.8 °C)] 98.3 °F (36.8 °C)  Heart Rate:  [79-84] 79  Resp:  [18-20] 20  BP: ()/(54-79) 102/54    Intake/Output:    Intake/Output Summary (Last 24 hours) at 10/10/2020 1758  Last data filed at 10/10/2020 0900  Gross per 24 hour   Intake 600 ml   Output 650 ml   Net -50 ml         Results from last 7 days   Lab Units 10/10/20  0849 10/09/20  0548 10/08/20  0734 10/07/20  0755 10/06/20  0710 10/05/20  0513 10/04/20  0619   WBC 10*3/mm3 15.36* 20.49* 16.55* 14.07* 16.15* 15.53* 15.08*   HEMOGLOBIN g/dL 11.6* 11.7* 11.7* 11.6* 11.8* 11.2* 11.2*   PLATELETS 10*3/mm3 196 212 189 173 171 150 136*     Results from last 7 days   Lab Units 10/10/20  0849 10/09/20  0548 10/08/20  0734 10/07/20  0755 10/06/20  0710 10/05/20  0513 10/04/20  0619   SODIUM mmol/L 137 138 137 135* 139 136 133*   POTASSIUM " mmol/L 3.9 4.0 4.2 4.0 3.9 3.5 3.8   CHLORIDE mmol/L 97* 97* 97* 97* 99 99 96*   CO2 mmol/L 31.5* 29.7* 28.4 30.9* 30.8* 26.5 30.5*   BUN mg/dL 21 23 21 17 16 18 20   CREATININE mg/dL 0.87 1.03* 0.85 0.83 0.78 0.85 0.80   GLUCOSE mg/dL 139* 164* 279* 147* 150* 157* 158*   Estimated Creatinine Clearance: 51 mL/min (by C-G formula based on SCr of 0.87 mg/dL).  Results from last 7 days   Lab Units 10/10/20  0849 10/09/20  0548 10/08/20  0734 10/07/20  0755 10/06/20  0710 10/05/20  0513 10/04/20  0619   CALCIUM mg/dL 8.7 8.9 8.5* 8.2* 8.4* 8.5* 8.3*         Assessment:  Chronic bronchitis: Severe with pseudomonas aeruginosa colonization versus persistent infection.  Pulmonary, ID input appreciated.  Atelectasis: Monitor for evolving pneumonia.  Acute hypoxic respiratory failure.  Persistent versus recurrent rhinovirus infection:  Chronic left hemidiaphragm paralysis.  Diabetes type 2:  Paroxysmal atrial fibrillation:  Chronic kidney disease stage III:  Morbid obesity:  Chronic anticoagulation, Coumadin:  Closed wedge compression fraction, T5 vertebrae:  CLL:  Pulmonary hypertension:  Systemic hypertension:  Obstructive sleep apnea:        Plan:  Multiple medical issues, highly complex.  Discussed with patient at length.  Over 30 minutes spent with over one half in counseling and medical management.    Rd Yoo MD  10/10/2020  17:58 EDT

## 2020-10-11 LAB
BACTERIA SPEC RESP CULT: NORMAL
GLUCOSE BLDC GLUCOMTR-MCNC: 130 MG/DL (ref 70–130)
GLUCOSE BLDC GLUCOMTR-MCNC: 131 MG/DL (ref 70–130)
GLUCOSE BLDC GLUCOMTR-MCNC: 162 MG/DL (ref 70–130)
GLUCOSE BLDC GLUCOMTR-MCNC: 169 MG/DL (ref 70–130)
GRAM STN SPEC: NORMAL
INR PPP: 2.99 (ref 0.9–1.1)
PROTHROMBIN TIME: 30 SECONDS (ref 11.7–14.2)

## 2020-10-11 PROCEDURE — 25010000002 CEFEPIME PER 500 MG: Performed by: INTERNAL MEDICINE

## 2020-10-11 PROCEDURE — 82962 GLUCOSE BLOOD TEST: CPT

## 2020-10-11 PROCEDURE — 94799 UNLISTED PULMONARY SVC/PX: CPT

## 2020-10-11 PROCEDURE — 97110 THERAPEUTIC EXERCISES: CPT

## 2020-10-11 PROCEDURE — 63710000001 INSULIN LISPRO (HUMAN) PER 5 UNITS: Performed by: HOSPITALIST

## 2020-10-11 PROCEDURE — 63710000001 TOBRAMYCIN PER 300 MG: Performed by: INTERNAL MEDICINE

## 2020-10-11 PROCEDURE — 85610 PROTHROMBIN TIME: CPT | Performed by: HOSPITALIST

## 2020-10-11 RX ADMIN — ROSUVASTATIN CALCIUM 20 MG: 20 TABLET, FILM COATED ORAL at 20:12

## 2020-10-11 RX ADMIN — INSULIN LISPRO 3 UNITS: 100 INJECTION, SOLUTION INTRAVENOUS; SUBCUTANEOUS at 12:07

## 2020-10-11 RX ADMIN — ACETYLCYSTEINE 2 ML: 200 SOLUTION ORAL; RESPIRATORY (INHALATION) at 16:17

## 2020-10-11 RX ADMIN — ACETYLCYSTEINE 2 ML: 200 SOLUTION ORAL; RESPIRATORY (INHALATION) at 22:21

## 2020-10-11 RX ADMIN — ASPIRIN 81 MG: 81 TABLET, COATED ORAL at 09:10

## 2020-10-11 RX ADMIN — BUDESONIDE AND FORMOTEROL FUMARATE DIHYDRATE 2 PUFF: 160; 4.5 AEROSOL RESPIRATORY (INHALATION) at 07:45

## 2020-10-11 RX ADMIN — TOBRAMYCIN 300 MG: 1.2 INJECTION, POWDER, LYOPHILIZED, FOR SOLUTION INTRAVENOUS at 22:31

## 2020-10-11 RX ADMIN — ACETYLCYSTEINE 2 ML: 200 SOLUTION ORAL; RESPIRATORY (INHALATION) at 12:50

## 2020-10-11 RX ADMIN — HYDROCODONE BITARTRATE AND ACETAMINOPHEN 1 TABLET: 5; 325 TABLET ORAL at 06:54

## 2020-10-11 RX ADMIN — HYDROCODONE BITARTRATE AND ACETAMINOPHEN 1 TABLET: 5; 325 TABLET ORAL at 01:03

## 2020-10-11 RX ADMIN — TOBRAMYCIN 300 MG: 1.2 INJECTION, POWDER, LYOPHILIZED, FOR SOLUTION INTRAVENOUS at 07:41

## 2020-10-11 RX ADMIN — CEFEPIME HYDROCHLORIDE 2 G: 2 INJECTION, POWDER, FOR SOLUTION INTRAVENOUS at 14:26

## 2020-10-11 RX ADMIN — WARFARIN 1 MG: 1 TABLET ORAL at 17:57

## 2020-10-11 RX ADMIN — OXAZEPAM 10 MG: 10 CAPSULE, GELATIN COATED ORAL at 20:11

## 2020-10-11 RX ADMIN — HYDROCODONE BITARTRATE AND ACETAMINOPHEN 1 TABLET: 5; 325 TABLET ORAL at 12:45

## 2020-10-11 RX ADMIN — OXYBUTYNIN CHLORIDE 10 MG: 10 TABLET, EXTENDED RELEASE ORAL at 20:12

## 2020-10-11 RX ADMIN — IPRATROPIUM BROMIDE AND ALBUTEROL SULFATE 3 ML: 2.5; .5 SOLUTION RESPIRATORY (INHALATION) at 12:52

## 2020-10-11 RX ADMIN — ACETYLCYSTEINE 2 ML: 200 SOLUTION ORAL; RESPIRATORY (INHALATION) at 07:39

## 2020-10-11 RX ADMIN — PANTOPRAZOLE SODIUM 40 MG: 40 TABLET, DELAYED RELEASE ORAL at 09:10

## 2020-10-11 RX ADMIN — FLUTICASONE PROPIONATE 1 SPRAY: 50 SPRAY, METERED NASAL at 09:10

## 2020-10-11 RX ADMIN — GLIPIZIDE 2.5 MG: 5 TABLET ORAL at 09:09

## 2020-10-11 RX ADMIN — FUROSEMIDE 20 MG: 20 TABLET ORAL at 06:54

## 2020-10-11 RX ADMIN — FLUTICASONE PROPIONATE 1 SPRAY: 50 SPRAY, METERED NASAL at 20:13

## 2020-10-11 RX ADMIN — HYDROCODONE BITARTRATE AND ACETAMINOPHEN 1 TABLET: 5; 325 TABLET ORAL at 19:21

## 2020-10-11 RX ADMIN — CARVEDILOL 3.12 MG: 3.12 TABLET, FILM COATED ORAL at 09:10

## 2020-10-11 RX ADMIN — IPRATROPIUM BROMIDE AND ALBUTEROL SULFATE 3 ML: 2.5; .5 SOLUTION RESPIRATORY (INHALATION) at 07:39

## 2020-10-11 RX ADMIN — GUAIFENESIN 600 MG: 600 TABLET, EXTENDED RELEASE ORAL at 20:11

## 2020-10-11 RX ADMIN — BUDESONIDE AND FORMOTEROL FUMARATE DIHYDRATE 2 PUFF: 160; 4.5 AEROSOL RESPIRATORY (INHALATION) at 19:27

## 2020-10-11 RX ADMIN — CEFEPIME HYDROCHLORIDE 2 G: 2 INJECTION, POWDER, FOR SOLUTION INTRAVENOUS at 01:04

## 2020-10-11 RX ADMIN — IPRATROPIUM BROMIDE AND ALBUTEROL SULFATE 3 ML: 2.5; .5 SOLUTION RESPIRATORY (INHALATION) at 16:15

## 2020-10-11 RX ADMIN — MONTELUKAST SODIUM 10 MG: 10 TABLET, FILM COATED ORAL at 20:11

## 2020-10-11 RX ADMIN — INSULIN LISPRO 3 UNITS: 100 INJECTION, SOLUTION INTRAVENOUS; SUBCUTANEOUS at 09:09

## 2020-10-11 RX ADMIN — GUAIFENESIN 600 MG: 600 TABLET, EXTENDED RELEASE ORAL at 09:10

## 2020-10-11 RX ADMIN — LOSARTAN POTASSIUM 25 MG: 25 TABLET, FILM COATED ORAL at 09:10

## 2020-10-11 RX ADMIN — IPRATROPIUM BROMIDE AND ALBUTEROL SULFATE 3 ML: 2.5; .5 SOLUTION RESPIRATORY (INHALATION) at 22:21

## 2020-10-11 NOTE — PROGRESS NOTES
"  Daily Progress Note.   [address]  10/11/2020    Patient:  Name:  Caitlyn Longoria  MRN:  [id number]  1934  85 y.o.  female         CC: felt badly    Interval History:  Follow up for pna, mucus plugging.  Still with a rhonchorous cough.  Says she still isnt getting much sputum up  She is participating in all therapy however she says that she is still afraid of coughing too hard and hurting her back  ROS: No fever, no diarrhea, no chest pain  PMFSSH: no change    Physical Exam:  /76 (BP Location: Left arm, Patient Position: Sitting)   Pulse 79   Temp 98.3 °F (36.8 °C) (Oral)   Resp 20   Ht 157.5 cm (62.01\")   Wt 95.7 kg (211 lb)   SpO2 93%   BMI 38.58 kg/m²   Body mass index is 38.58 kg/m².    Intake/Output Summary (Last 24 hours) at 10/11/2020 1303  Last data filed at 10/11/2020 0740  Gross per 24 hour   Intake 720 ml   Output 1000 ml   Net -280 ml     General appearance: ill but non toxic, conversant   Eyes: anicteric sclerae, moist conjunctivae; no lid-lag; PERRLA  HENT: Atraumatic; oropharynx mmm no lesion  Neck: Trachea midline; FROM, supple, no thyromegaly or lymphadenopathy  Lungs: bilateral air entry, no wheeze  No focal crackles, sigrhonchi through through lung fields, with normal respiratory effort and no intercostal retractions  CV: RRR, no rub  Abdomen: Soft, non-tender; no masses or HSM +obese  Extremities: No peripheral edema or extremity lymphadenopathy  Skin: Normal temperature, turgor and texture; no rash, ulcers or subcutaneous nodules multiple areas of ecchymosis  Psych: Appropriate affect, alert and oriented to person, place and time    Data Review:  Notable Labs:  Results from last 7 days   Lab Units 10/10/20  0849 10/09/20  0548 10/08/20  0734 10/07/20  0755 10/06/20  0710 10/05/20  0513   WBC 10*3/mm3 15.36* 20.49* 16.55* 14.07* 16.15* 15.53*   HEMOGLOBIN g/dL 11.6* 11.7* 11.7* 11.6* 11.8* 11.2*   PLATELETS 10*3/mm3 196 212 189 173 171 150     Results " from last 7 days   Lab Units 10/10/20  0849 10/09/20  0548 10/08/20  0734 10/07/20  0755 10/06/20  0710 10/05/20  0513   SODIUM mmol/L 137 138 137 135* 139 136   POTASSIUM mmol/L 3.9 4.0 4.2 4.0 3.9 3.5   CHLORIDE mmol/L 97* 97* 97* 97* 99 99   CO2 mmol/L 31.5* 29.7* 28.4 30.9* 30.8* 26.5   BUN mg/dL 21 23 21 17 16 18   CREATININE mg/dL 0.87 1.03* 0.85 0.83 0.78 0.85   GLUCOSE mg/dL 139* 164* 279* 147* 150* 157*   CALCIUM mg/dL 8.7 8.9 8.5* 8.2* 8.4* 8.5*   Estimated Creatinine Clearance: 51 mL/min (by C-G formula based on SCr of 0.87 mg/dL).    Results from last 7 days   Lab Units 10/10/20  0849 10/09/20  0548 10/08/20  0734   PLATELETS 10*3/mm3 196 212 189       Results from last 7 days   Lab Units 10/07/20  1555   PH, ARTERIAL pH units 7.397   PCO2, ARTERIAL mm Hg 46.9*   PO2 ART mm Hg 62.4*   HCO3 ART mmol/L 28.9*       Imaging:  Reviewed chest images personally from past 3 days    Scheduled meds:    acetylcysteine, 2 mL, Nebulization, 4x Daily - RT  aspirin, 81 mg, Oral, Daily  budesonide-formoterol, 2 puff, Inhalation, BID - RT  carvedilol, 3.125 mg, Oral, BID  cefepime, 2 g, Intravenous, Q12H  fluticasone, 1 spray, Nasal, BID  furosemide, 20 mg, Oral, QAM  glipizide, 2.5 mg, Oral, QAM  guaiFENesin, 600 mg, Oral, Q12H  insulin lispro, 0-14 Units, Subcutaneous, TID AC  ipratropium-albuterol, 3 mL, Nebulization, 4x Daily - RT  losartan, 25 mg, Oral, Daily  montelukast, 10 mg, Oral, Nightly  oxazepam, 10 mg, Oral, Nightly  oxybutynin XL, 10 mg, Oral, Nightly  pantoprazole, 40 mg, Oral, Daily  rosuvastatin, 20 mg, Oral, Nightly  tobramycin, 300 mg, Nebulization, BID - RT  warfarin, 1 mg, Oral, Daily        ASSESSMENT  /  PLAN:  1. COPD/asthma with mild exacerbation  2. Right lower lobe pneumonia with heavy growth of Pseudomonas on cultures  3. Chronic bronchitis  4. Acute hypoxemic respiratory failure  5. Elevated left hemidiaphragm: Chronic  6. Ischemic cardiomyopathy  7. Diastolic dysfunction  8. Mitral  regurgitation  9. Atrial fibrillation and sick sinus syndrome  10. CLL  11. 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 Hypertension  13. Diabetes  14. CHARLENE on BiPAP  15. Immunodeficiency     bronchs on 6th and 9th for mucous plugging...Concerning given age and need for bronchs for airway clearance 3 days apart.    Cont abx  Cont chest pt  Encouraged flutter  Cont nebs  Repeat cxr tomorrow.  Strongly encouraged her to cough forcefully to help clear her secretions.  Chronic bronchs every 3 days cant be a long term plan.    Cam Suarez MD  [facility]  10/11/20  3697

## 2020-10-11 NOTE — PROGRESS NOTES
Pharmacy Consult: Warfarin Dosing/ Monitoring    Caitlyn Longoria is a 85 y.o. female, estimated creatinine clearance is 51 mL/min (by C-G formula based on SCr of 0.87 mg/dL). weighing 95.7 kg (211 lb).    She has a past medical history of Aortic calcification (CMS/[facility]), Aortic regurgitation, Asthma, Atrial fibrillation (CMS/[facility]), CAD (coronary artery disease), Chronic combined systolic and diastolic congestive heart failure (CMS/HCC), CKD (chronic kidney disease) stage 3, GFR 30-59 ml/min, Coronary artery disease involving native coronary artery of native heart with angina pectoris (CMS/[facility]), Disc degeneration, lumbar, DM type 2 (diabetes mellitus, type 2) (CMS/[facility]), GERD (gastroesophageal reflux disease), History of aneurysm, History of blood transfusion, History of fracture, History of vitamin D deficiency, Hyperlipidemia, Hypertension, Mild mitral regurgitation, Mitral annular calcification, PAF (paroxysmal atrial fibrillation) (CMS/[facility]), Peripheral neuropathy, Skin cancer, Sleep apnea, SSS (sick sinus syndrome) (CMS/[facility]), Stroke (cerebrum) (CMS/[facility]), and Tricuspid regurgitation.    Social History     Tobacco Use   • Smoking status: Never Smoker   • Smokeless tobacco: Never Used   • Tobacco comment: caffeine use- soda   Substance Use Topics   • Alcohol use: Not Currently   • Drug use: No       Results from last 7 days   Lab Units 10/11/20  0442 10/10/20  0849 10/09/20  0548 10/08/20  0734 10/07/20  0755 10/06/20  0710 10/05/20  0513   INR  2.99* 2.95* 2.28* 1.94* 1.67* 1.62* 1.59*   HEMOGLOBIN g/dL  --  11.6* 11.7* 11.7* 11.6* 11.8* 11.2*   HEMATOCRIT %  --  35.8 35.9 36.0 36.9 36.0 34.3   PLATELETS 10*3/mm3  --  196 212 189 173 171 150     Results from last 7 days   Lab Units 10/10/20  0849 10/09/20  0548 10/08/20  0734   SODIUM mmol/L 137 138 137   POTASSIUM mmol/L 3.9 4.0 4.2   CHLORIDE mmol/L 97* 97* 97*   CO2 mmol/L 31.5* 29.7* 28.4   BUN mg/dL 21 23 21   CREATININE mg/dL 0.87 1.03* 0.85   CALCIUM mg/dL 8.7  8.9 8.5*   GLUCOSE mg/dL 139* 164* 279*     Anticoagulation history: h/o atrial fibrillation managed on warfarin through [facility]. As of 9/23 regimen noted as: Warfarin 2mg Su/T/W/Th/Sa and Warfarin 4mg M/F     Hospital Anticoagulation:  Consulting provider: Dr. Yoo  Start date: 9/30  Indication: Atrial Fibrillation  Target INR: 2-3  Expected duration: Indefinite   Bridge Therapy: No                  Date 9/29 9/30  10/1 10/2  10/3  10/4  10/5    INR 3.41 3.78  3.6 2.31  1.98  1.70  1.59   Warfarin dose 0 0 0  2  mg  3mg  3mg  held per md       Date  10/6  10/7  10/8 10/9 10/10 10/11    INR  1.62  1.67  1.94 2.28 2.95 2.99    Warfarin dose  3 mg  3 mg   3 mg 3 mg  1 mg       Potential drug interactions:    d/c'ed 10/2  - Cefepime: May enhance the anticoagulant effect of warfarin.   - Rosuvastatin (home med, Moderate): May enhance anticoagulant effect of warfarin  - Pantoprazole (Moderate, home med): May enhance anticoagulant effect of warfarin.  - Acetaminophen (prn) - may result in increased risk of bleeding     -- once dose doxy / ctx on 9/29   - ASA: May enhance the anticoagulant effect of warfarin. (home med, reordered on admission)  - Glipizide: Sulfonylureas may enhance the anticoagulant effect of warfarin. Warfarin may enhance the hypoglycemic effect of Sulfonylureas. (home med, reordered on admission)     Relevant nutrition status: Reg diet; 10/10 intake: 75% breakfast,  lunch not charted, 100% dinner    Education complete?/ Date: Not at this time     Assessment/Plan:  1) Continue warfarin 1 mg PO daily again today.     2) INR ordered daily with AM labs while inpatient.    Pharmacy will continue to follow until discharge or discontinuation of warfarin.     Rommel Conde, PharmD  10/11/2020

## 2020-10-11 NOTE — PLAN OF CARE
Pt eager to walk but doesn't feel she needs back brace since no pain?  150 feet with RWX on O2 and min assist of 1.  Up in chair long time so assisted back to bed.

## 2020-10-11 NOTE — PROGRESS NOTES
"DAILY PROGRESS NOTE  [facility]    Patient Identification:  Name: Caitlyn Longoria  Age: 85 y.o.  Sex: female  :  1934  MRN: [id number]         Primary Care Physician: Zenaida Suarez MD      Subjective  No new complaints.  Still feeling short of air.  States she is only successful in bringing up a little sputum.    Objective:  General Appearance:  Well-appearing, in no acute distress and not in pain (Morbidly obese.  Appears a little more comfortable today.).    Vital signs: (most recent): Blood pressure 123/76, pulse 79, temperature 98.3 °F (36.8 °C), temperature source Oral, resp. rate 20, height 157.5 cm (62.01\"), weight 95.7 kg (211 lb), SpO2 94 %, not currently breastfeeding.    Lungs:  Normal respiratory rate.  She is not in respiratory distress.  No stridor.  There are rales and rhonchi.  No decreased breath sounds or wheezes.  (Still appears a little short of air but a little improved.  Although she still has loose rhonchi throughout air movement also appears improved.  Minimal if any increase in expiratory phase this time around.)  Heart: Normal rate.  Regular rhythm.    Extremities: There is no dependent edema.    Neurological: Patient is alert and oriented to person, place and time.    Skin:  Warm and dry.                Vital signs in last 24 hours:  Temp:  [97.7 °F (36.5 °C)-98.3 °F (36.8 °C)] 98.3 °F (36.8 °C)  Heart Rate:  [71-85] 79  Resp:  [20-26] 20  BP: (123-126)/(76-78) 123/76    Intake/Output:    Intake/Output Summary (Last 24 hours) at 10/11/2020 1805  Last data filed at 10/11/2020 0740  Gross per 24 hour   Intake 480 ml   Output 1000 ml   Net -520 ml         Results from last 7 days   Lab Units 10/10/20  0849 10/09/20  0548 10/08/20  0734 10/07/20  0755 10/06/20  0710 10/05/20  0513   WBC 10*3/mm3 15.36* 20.49* 16.55* 14.07* 16.15* 15.53*   HEMOGLOBIN g/dL 11.6* 11.7* 11.7* 11.6* 11.8* 11.2*   PLATELETS 10*3/mm3 196 212 189 173 171 150     Results from " last 7 days   Lab Units 10/10/20  0849 10/09/20  0548 10/08/20  0734 10/07/20  0755 10/06/20  0710 10/05/20  0513   SODIUM mmol/L 137 138 137 135* 139 136   POTASSIUM mmol/L 3.9 4.0 4.2 4.0 3.9 3.5   CHLORIDE mmol/L 97* 97* 97* 97* 99 99   CO2 mmol/L 31.5* 29.7* 28.4 30.9* 30.8* 26.5   BUN mg/dL 21 23 21 17 16 18   CREATININE mg/dL 0.87 1.03* 0.85 0.83 0.78 0.85   GLUCOSE mg/dL 139* 164* 279* 147* 150* 157*   Estimated Creatinine Clearance: 51 mL/min (by C-G formula based on SCr of 0.87 mg/dL).  Results from last 7 days   Lab Units 10/10/20  0849 10/09/20  0548 10/08/20  0734 10/07/20  0755 10/06/20  0710 10/05/20  0513   CALCIUM mg/dL 8.7 8.9 8.5* 8.2* 8.4* 8.5*         Assessment:  Chronic bronchitis: Severe with pseudomonas aeruginosa colonization versus persistent infection.  Pulmonary, ID input appreciated.  Atelectasis: Monitor for evolving pneumonia.  Acute hypoxic respiratory failure.  Persistent versus recurrent rhinovirus infection:  Chronic left hemidiaphragm paralysis.  Diabetes type 2:  Paroxysmal atrial fibrillation:  Chronic kidney disease stage III:  Morbid obesity:  Chronic anticoagulation, Coumadin:  Closed wedge compression fraction, T5 vertebrae: Conservative management.  Neurosurgical input appreciated.  CLL:  Pulmonary hypertension:  Systemic hypertension:  Obstructive sleep apnea:        Plan:  Please see above.  Continue present management.  We do seem to be seeing a little bit of an improvement today.    Rd Yoo MD  10/11/2020  18:05 EDT

## 2020-10-11 NOTE — PLAN OF CARE
Problem: Adult Inpatient Plan of Care  Goal: Plan of Care Review  Outcome: Ongoing, Progressing  Flowsheets (Taken 10/11/2020 0357)  Progress: improving  Plan of Care Reviewed With: patient  Outcome Summary: Monitor pain,labs,and vitals. Pain controlled with PRN medications per orders. Encouraged pulmonary hygiene. Pt destas with minimal movements-O2 3L NC. Will continue to monitor.  Goal: Patient-Specific Goal (Individualized)  Outcome: Ongoing, Progressing  Goal: Absence of Hospital-Acquired Illness or Injury  Outcome: Ongoing, Progressing  Intervention: Identify and Manage Fall Risk  Recent Flowsheet Documentation  Taken 10/11/2020 0119 by Symone Hussein RN  Safety Promotion/Fall Prevention:   activity supervised   assistive device/personal items within reach   clutter free environment maintained   fall prevention program maintained   lighting adjusted   room organization consistent   nonskid shoes/slippers when out of bed   safety round/check completed  Taken 10/11/2020 0002 by Symone Hussein RN  Safety Promotion/Fall Prevention:   activity supervised   assistive device/personal items within reach   clutter free environment maintained   fall prevention program maintained   lighting adjusted   nonskid shoes/slippers when out of bed   room organization consistent   safety round/check completed  Taken 10/10/2020 2215 by Symone Hussein RN  Safety Promotion/Fall Prevention:   activity supervised   assistive device/personal items within reach   clutter free environment maintained   fall prevention program maintained   lighting adjusted   room organization consistent   nonskid shoes/slippers when out of bed   safety round/check completed  Taken 10/10/2020 2206 by Symone Hussein RN  Safety Promotion/Fall Prevention:   activity supervised   assistive device/personal items within reach   clutter free environment maintained   fall prevention program maintained   lighting adjusted   nonskid shoes/slippers when out of  bed   room organization consistent   safety round/check completed  Intervention: Prevent Skin Injury  Recent Flowsheet Documentation  Taken 10/11/2020 0119 by Symone Hussein RN  Body Position:   position changed independently   supine, legs elevated  Taken 10/10/2020 2206 by Symone Hussein RN  Body Position: (up in chair)   position changed independently   other (see comments)  Intervention: Prevent and Manage VTE (venous thromboembolism) Risk  Recent Flowsheet Documentation  Taken 10/11/2020 0119 by Symone Hussein RN  VTE Prevention/Management:   bilateral   dorsiflexion/plantar flexion performed  Taken 10/10/2020 2206 by Symone Hussein RN  VTE Prevention/Management:   bilateral   dorsiflexion/plantar flexion performed  Intervention: Prevent Infection  Recent Flowsheet Documentation  Taken 10/11/2020 0119 by Symone Hussein RN  Infection Prevention:   single patient room provided   rest/sleep promoted   personal protective equipment utilized   hand hygiene promoted  Taken 10/11/2020 0002 by Symone Hussein RN  Infection Prevention:   rest/sleep promoted   single patient room provided   personal protective equipment utilized   hand hygiene promoted  Taken 10/10/2020 2215 by Symone Hussein RN  Infection Prevention:   rest/sleep promoted   single patient room provided   personal protective equipment utilized   hand hygiene promoted  Taken 10/10/2020 2206 by Symone Hussein RN  Infection Prevention:   rest/sleep promoted   personal protective equipment utilized   hand hygiene promoted   single patient room provided  Goal: Optimal Comfort and Wellbeing  Outcome: Ongoing, Progressing  Intervention: Provide Person-Centered Care  Recent Flowsheet Documentation  Taken 10/11/2020 0119 by Symone Hussein RN  Trust Relationship/Rapport: care explained  Taken 10/10/2020 2206 by Symone Hussein RN  Trust Relationship/Rapport:   care explained   choices provided   emotional support provided   empathic listening provided   questions  answered   questions encouraged   reassurance provided   thoughts/feelings acknowledged  Goal: Readiness for Transition of Care  Outcome: Ongoing, Progressing   Goal Outcome Evaluation:  Plan of Care Reviewed With: patient  Progress: improving  Outcome Summary: Monitor pain,labs,and vitals. Pain controlled with PRN medications per orders. Encouraged pulmonary hygiene. Pt destas with minimal movements-O2 3L NC. Will continue to monitor.

## 2020-10-11 NOTE — THERAPY TREATMENT NOTE
Patient Name: Caitlyn Longoria  : 1934    MRN: [id number]                              Today's Date: 10/11/2020       Admit Date: 2020    Visit Dx:     ICD-10-CM ICD-9-CM   1. Pneumonia due to infectious organism, unspecified laterality, unspecified part of lung  J18.9 486   2. Dyspnea, unspecified type  R06.00 786.09   3. Leukocytosis, unspecified type  D72.829 288.60   4. Pneumonia due to gram-negative bacteria (CMS/[facility])  J15.6 482.83     Patient Active Problem List   Diagnosis   • Neck and shoulder pain   • Arthropathy of shoulder region   • Carpal tunnel syndrome of left wrist   • Chronic pain of both shoulders   • Chronic left shoulder pain   • Arthropathy of left shoulder   • Dysarthria   • DM II (diabetes mellitus, type II), controlled (CMS/[facility])   • Paroxysmal atrial fibrillation (CMS/[facility])   • HLD (hyperlipidemia)   • Chronic bronchitis (CMS/[facility])   • Coronary artery disease involving native coronary artery of native heart with angina pectoris (CMS/[facility])   • CVA (cerebral vascular accident) (CMS/[facility])   • HTN (hypertension)   • CKD (chronic kidney disease), stage III   • Chronic combined systolic and diastolic congestive heart failure (CMS/[facility])   • Infection of prosthetic right knee joint (CMS/[facility])   • Stasis dermatitis of right lower extremity due to peripheral venous hypertension   • Current use of long term anticoagulation   • Morbid obesity (CMS/[facility])   • Acute respiratory failure with hypoxia (CMS/[facility])   • Rhinovirus   • Asthma with acute exacerbation   • Acute respiratory failure with hypoxemia (CMS/[facility])   • Viral pneumonia   • Hypoxia   • CLL (chronic lymphoid leukemia) in relapse (CMS/[facility])   • Dyspnea   • Anticoagulated on Coumadin   • Closed wedge compression fracture of T5 vertebra (CMS/[facility])   • Pneumonia due to gram-negative bacteria (CMS/[facility])   • Pneumonia due to infectious organism     Past Medical History:   Diagnosis Date   • Aortic calcification (CMS/[facility])     mild, on echo 2017    • Aortic regurgitation     Trace   • Asthma    • Atrial fibrillation (CMS/[facility])    • CAD (coronary artery disease)    • Chronic combined systolic and diastolic congestive heart failure (CMS/[facility])    • CKD (chronic kidney disease) stage 3, GFR 30-59 ml/min    • Coronary artery disease involving native coronary artery of native heart with angina pectoris (CMS/[facility])    • Disc degeneration, lumbar    • DM type 2 (diabetes mellitus, type 2) (CMS/[facility])    • GERD (gastroesophageal reflux disease)    • History of aneurysm     right femoral artery s/p LHC   • History of blood transfusion    • History of fracture    • History of vitamin D deficiency    • Hyperlipidemia    • Hypertension    • Mild mitral regurgitation    • Mitral annular calcification     12/8/2017- echo, moderate   • PAF (paroxysmal atrial fibrillation) (CMS/[facility])    • Peripheral neuropathy    • Skin cancer     Left hand   • Sleep apnea    • SSS (sick sinus syndrome) (CMS/[facility])    • Stroke (cerebrum) (CMS/[facility])    • Tricuspid regurgitation     Trace     Past Surgical History:   Procedure Laterality Date   • BRONCHOSCOPY Bilateral 10/6/2020    Procedure: BRONCHOSCOPY with BILATERAL LUNG washings;  Surgeon: Juno Coburn MD;  Location: [facility] ENDOSCOPY;  Service: Pulmonary;  Laterality: Bilateral;  PRE: purulent bronchitis  POST: PURULENT BRONCHITIS   • CARDIAC CATHETERIZATION     • CARDIAC ELECTROPHYSIOLOGY PROCEDURE N/A 2/7/2020    Procedure: PPM generator change - dual  medtronic;  Surgeon: Kiel Field MD;  Location: [facility] CATH INVASIVE LOCATION;  Service: Cardiology;  Laterality: N/A;   • CHOLECYSTECTOMY     • CORONARY STENT PLACEMENT     • HERNIA REPAIR      hital hernia   • HYSTERECTOMY     • PACEMAKER IMPLANTATION     • REPLACEMENT TOTAL KNEE Bilateral      General Information     Row Name 10/11/20 2775          Physical Therapy Time and Intention    Document Type  therapy note (daily note)  -SI     Mode of Treatment  physical therapy  -SI     Row  Name 10/11/20 1415          General Information    Barriers to Rehab  -- Doesn't want to wear the brace  -SI     Row Name 10/11/20 1415          Cognition    Orientation Status (Cognition)  oriented x 3  -SI       User Key  (r) = Recorded By, (t) = Taken By, (c) = Cosigned By    Initials Name Provider Type    Charity Patel PTA Physical Therapy Assistant        Mobility     Row Name 10/11/20 1418          Bed Mobility    Bed Mobility  bed mobility (all) activities Up in chair  -SI     Sit-Supine [place] (Bed Mobility)  minimum assist (75% patient effort) assist with legs to get back into bed  -SI     Row Name 10/11/20 1418          Sit-Stand Transfer    Sit-Stand [place] (Transfers)  minimum assist (75% patient effort)  -SI     Assistive Device (Sit-Stand Transfers)  walker, front-wheeled  -SI     Row Name 10/11/20 1418          Gait/Stairs (Locomotion)    [place] Level (Gait)  minimum assist (75% patient effort);1 person assist  -SI     Assistive Device (Gait)  walker, front-wheeled O2 at 3 liters per min  -SI     Distance in Feet (Gait)  150 Pt states better gait without need to stop with O2  -SI     Deviations/Abnormal Patterns (Gait)  -- Gets RWX too far out in front  -SI       User Key  (r) = Recorded By, (t) = Taken By, (c) = Cosigned By    Initials Name Provider Type    Charity Patel PTA Physical Therapy Assistant        Obj/Interventions    No documentation.       Goals/Plan    No documentation.       Clinical Impression     Row Name 10/11/20 1423          Pain Scale: Numbers Pre/Post-Treatment    Pretreatment Pain Rating  0/10 - no pain  -SI     Posttreatment Pain Rating  0/10 - no pain  -SI     Row Name 10/11/20 1423          Vital Signs    Pre SpO2 (%)  -- device not connected/giving a reading  -SI     O2 Delivery Pre Treatment  supplemental O2  -SI     O2 Delivery Intra Treatment  supplemental O2  -SI     O2 Delivery Post Treatment  supplemental O2  -SI     Row Name 10/11/20  1423          Positioning and Restraints    Pre-Treatment Position  sitting in chair/recliner  -SI     Post Treatment Position  bed  -SI     In Bed  supine;call light within reach  -SI       User Key  (r) = Recorded By, (t) = Taken By, (c) = Cosigned By    Initials Name Provider Type    Charity Patel PTA Physical Therapy Assistant        Outcome Measures     Row Name 10/11/20 1425          How much help from another person do you currently need...    Turning from your back to your side while in flat bed without using bedrails?  3  -SI     Moving from lying on back to sitting on the side of a flat bed without bedrails?  3  -SI     Moving to and from a bed to a chair (including a wheelchair)?  3  -SI     Standing up from a chair using your arms (e.g., wheelchair, bedside chair)?  3  -SI     Climbing 3-5 steps with a railing?  2  -SI     To walk in hospital room?  3  -SI     AM-PAC 6 Clicks Score (PT)  17  -SI       User Key  (r) = Recorded By, (t) = Taken By, (c) = Cosigned By    Initials Name Provider Type    Charity Patel PTA Physical Therapy Assistant        Physical Therapy Education                 Title: PT OT SLP Therapies (Done)     Topic: Physical Therapy (Done)     Point: Mobility training (Done)     Learning Progress Summary           Patient Acceptance, E, VU,NR by CARRIE at 10/11/2020 1426    Comment: pt advised not to get RWX too far out in front.  Need to discuss with MD about brace if choose not to wear because no pain    Acceptance, E,TB,D, VU,NR by LIBRA at 10/8/2020 1723    Acceptance, E,TB, VU,NR by EE at 10/7/2020 1309    Acceptance, E, VU by PC at 10/7/2020 0435    Acceptance, E, VU by AL at 10/4/2020 1234    Acceptance, E,TB, VU,DU by LB at 10/3/2020 1243    Eager, E,TB,D, VU,NR by LIBRA at 10/2/2020 1717    Eager, TB, DU,NR by MARIBELL at 10/1/2020 1558    Comment: review TLSO   Family Acceptance, E,TB,D, VU,NR by LIBRA at 10/8/2020 1723                   Point: Home exercise program (Done)      Learning Progress Summary           Patient Acceptance, E, VU,NR by SI at 10/11/2020 1426    Comment: pt advised not to get RWX too far out in front.  Need to discuss with MD about brace if choose not to wear because no pain    Acceptance, E,TB,D, VU,NR by LIBRA at 10/8/2020 1723    Acceptance, E,TB, VU,NR by EE at 10/7/2020 1309    Acceptance, E, VU by PC at 10/7/2020 0435    Acceptance, E, VU by AL at 10/4/2020 1234    Acceptance, E,TB, VU,DU by LB at 10/3/2020 1243    Eager, E,TB,D, VU,NR by LIBRA at 10/2/2020 1717    Eager, TB, DU,NR by MARIBELL at 10/1/2020 1558    Comment: review TLSO   Family Acceptance, E,TB,D, VU,NR by LIBRA at 10/8/2020 1723                   Point: Body mechanics (Done)     Learning Progress Summary           Patient Acceptance, E, VU,NR by SI at 10/11/2020 1426    Comment: pt advised not to get RWX too far out in front.  Need to discuss with MD about brace if choose not to wear because no pain    Acceptance, E,TB,D, VU,NR by LIBRA at 10/8/2020 1723    Acceptance, E,TB, VU,NR by EE at 10/7/2020 1309    Acceptance, E, VU by PC at 10/7/2020 0435    Acceptance, E, VU by AL at 10/4/2020 1234    Acceptance, E,TB, VU,DU by LB at 10/3/2020 1243    Eager, E,TB,D, VU,NR by LIBRA at 10/2/2020 1717    Eager, TB, DU,NR by DJ at 10/1/2020 1558    Comment: review TLSO   Family Acceptance, E,TB,D, VU,NR by LIBRA at 10/8/2020 1723                   Point: Precautions (Done)     Learning Progress Summary           Patient Acceptance, E, VU,NR by SI at 10/11/2020 1426    Comment: pt advised not to get RWX too far out in front.  Need to discuss with MD about brace if choose not to wear because no pain    Acceptance, E,TB,D, VU,NR by LIBRA at 10/8/2020 1723    Acceptance, E,TB, VU,NR by EE at 10/7/2020 1309    Acceptance, E, VU by PC at 10/7/2020 0435    Acceptance, E, VU by AL at 10/4/2020 1234    Acceptance, E,TB, VU,DU by LB at 10/3/2020 1243    Eager, E,TB,D, VU,NR by LIBRA at 10/2/2020 1717    Eager, TB, DU,NR by MARIBELL at 10/1/2020  1558    Comment: review TLSO   Family Acceptance, E,TB,D, VU,NR by  at 10/8/2020 1723                               User Key     Initials Effective Dates Name Provider Type Discipline    SI 05/18/15 -  Charity Pike PTA Physical Therapy Assistant PT    PC 06/16/16 -  Lisy Hartman, RN Registered Nurse Nurse    EE 04/03/18 -  Patricia Weaver, PT Physical Therapist PT    AL 04/03/18 -  Sonia Galan, PT Physical Therapist PT    JM 03/07/18 -  Jaylin Saunders, PTA Physical Therapy Assistant PT    LB 08/09/20 -  Rosalinda Lawrence, PT Physical Therapist PT    DJ 10/25/19 -  Edie Lowry, PT Physical Therapist PT              PT Recommendation and Plan           Time Calculation:   PT Charges     Row Name 10/11/20 1429             Time Calculation    Start Time  1400  -SI      Stop Time  1415  -SI      Time Calculation (min)  15 min  -SI      PT Received On  10/11/20  -SI      PT - Next Appointment  10/12/20  -SI         Time Calculation- PT    Total Timed Code Minutes- PT  15 minute(s)  -SI        User Key  (r) = Recorded By, (t) = Taken By, (c) = Cosigned By    Initials Name Provider Type    SI Charity Pike PTA Physical Therapy Assistant        Therapy Charges for Today     Code Description Service Date Service Provider Modifiers Qty    24411138841 HC PT THER PROC EA 15 MIN 10/11/2020 Charity Pike PTA GP 1          PT G-Codes  Outcome Measure Options: AM-PAC 6 Clicks Basic Mobility (PT)  AM-PAC 6 Clicks Score (PT): 17    Charity Pike PTA  10/11/2020

## 2020-10-12 ENCOUNTER — APPOINTMENT (OUTPATIENT)
Dept: GENERAL RADIOLOGY | Facility: HOSPITAL | Age: 85
End: 2020-10-12

## 2020-10-12 LAB
ANION GAP SERPL CALCULATED.3IONS-SCNC: 10.4 MMOL/L (ref 5–15)
BUN SERPL-MCNC: 22 MG/DL (ref 8–23)
BUN/CREAT SERPL: 22.7 (ref 7–25)
CALCIUM SPEC-SCNC: 8.4 MG/DL (ref 8.6–10.5)
CHLORIDE SERPL-SCNC: 99 MMOL/L (ref 98–107)
CO2 SERPL-SCNC: 29.6 MMOL/L (ref 22–29)
CREAT SERPL-MCNC: 0.97 MG/DL (ref 0.57–1)
DEPRECATED RDW RBC AUTO: 45.7 FL (ref 37–54)
ERYTHROCYTE [DISTWIDTH] IN BLOOD BY AUTOMATED COUNT: 12.9 % (ref 12.3–15.4)
GFR SERPL CREATININE-BSD FRML MDRD: 55 ML/MIN/1.73
GLUCOSE BLDC GLUCOMTR-MCNC: 135 MG/DL (ref 70–130)
GLUCOSE BLDC GLUCOMTR-MCNC: 137 MG/DL (ref 70–130)
GLUCOSE BLDC GLUCOMTR-MCNC: 163 MG/DL (ref 70–130)
GLUCOSE SERPL-MCNC: 130 MG/DL (ref 65–99)
HCT VFR BLD AUTO: 36.4 % (ref 34–46.6)
HGB BLD-MCNC: 11.5 G/DL (ref 12–15.9)
INR PPP: 2.22 (ref 0.9–1.1)
LYMPHOCYTES # BLD MANUAL: 8.55 10*3/MM3 (ref 0.7–3.1)
LYMPHOCYTES NFR BLD MANUAL: 3 % (ref 5–12)
LYMPHOCYTES NFR BLD MANUAL: 63 % (ref 19.6–45.3)
MCH RBC QN AUTO: 30.3 PG (ref 26.6–33)
MCHC RBC AUTO-ENTMCNC: 31.6 G/DL (ref 31.5–35.7)
MCV RBC AUTO: 95.8 FL (ref 79–97)
MONOCYTES # BLD AUTO: 0.41 10*3/MM3 (ref 0.1–0.9)
NEUTROPHILS # BLD AUTO: 4.34 10*3/MM3 (ref 1.7–7)
NEUTROPHILS NFR BLD MANUAL: 32 % (ref 42.7–76)
PLAT MORPH BLD: NORMAL
PLATELET # BLD AUTO: 170 10*3/MM3 (ref 140–450)
PMV BLD AUTO: 10 FL (ref 6–12)
POTASSIUM SERPL-SCNC: 3.4 MMOL/L (ref 3.5–5.2)
PROCALCITONIN SERPL-MCNC: 0.06 NG/ML (ref 0–0.25)
PROTHROMBIN TIME: 23.9 SECONDS (ref 11.7–14.2)
RBC # BLD AUTO: 3.8 10*6/MM3 (ref 3.77–5.28)
RBC MORPH BLD: NORMAL
SMUDGE CELLS BLD QL SMEAR: ABNORMAL
SODIUM SERPL-SCNC: 139 MMOL/L (ref 136–145)
VARIANT LYMPHS NFR BLD MANUAL: 2 % (ref 0–5)
WBC # BLD AUTO: 13.57 10*3/MM3 (ref 3.4–10.8)

## 2020-10-12 PROCEDURE — 80048 BASIC METABOLIC PNL TOTAL CA: CPT | Performed by: HOSPITALIST

## 2020-10-12 PROCEDURE — 63710000001 TOBRAMYCIN PER 300 MG: Performed by: INTERNAL MEDICINE

## 2020-10-12 PROCEDURE — 71045 X-RAY EXAM CHEST 1 VIEW: CPT

## 2020-10-12 PROCEDURE — 85007 BL SMEAR W/DIFF WBC COUNT: CPT | Performed by: INTERNAL MEDICINE

## 2020-10-12 PROCEDURE — 94799 UNLISTED PULMONARY SVC/PX: CPT

## 2020-10-12 PROCEDURE — 82962 GLUCOSE BLOOD TEST: CPT

## 2020-10-12 PROCEDURE — 85025 COMPLETE CBC W/AUTO DIFF WBC: CPT | Performed by: INTERNAL MEDICINE

## 2020-10-12 PROCEDURE — 25010000002 CEFEPIME PER 500 MG: Performed by: INTERNAL MEDICINE

## 2020-10-12 PROCEDURE — 85610 PROTHROMBIN TIME: CPT | Performed by: HOSPITALIST

## 2020-10-12 PROCEDURE — 97110 THERAPEUTIC EXERCISES: CPT

## 2020-10-12 PROCEDURE — 84145 PROCALCITONIN (PCT): CPT | Performed by: HOSPITALIST

## 2020-10-12 RX ORDER — POTASSIUM CHLORIDE 750 MG/1
40 CAPSULE, EXTENDED RELEASE ORAL ONCE
Status: COMPLETED | OUTPATIENT
Start: 2020-10-12 | End: 2020-10-12

## 2020-10-12 RX ORDER — ECHINACEA PURPUREA EXTRACT 125 MG
1 TABLET ORAL AS NEEDED
Status: DISCONTINUED | OUTPATIENT
Start: 2020-10-12 | End: 2020-10-14 | Stop reason: HOSPADM

## 2020-10-12 RX ORDER — WARFARIN SODIUM 3 MG/1
3 TABLET ORAL
Status: DISCONTINUED | OUTPATIENT
Start: 2020-10-12 | End: 2020-10-14 | Stop reason: HOSPADM

## 2020-10-12 RX ORDER — TOBRAMYCIN INHALATION SOLUTION 300 MG/5ML
300 INHALANT RESPIRATORY (INHALATION)
Qty: 300 ML | Refills: 0 | Status: SHIPPED | OUTPATIENT
Start: 2020-10-12 | End: 2020-11-11

## 2020-10-12 RX ADMIN — BUDESONIDE AND FORMOTEROL FUMARATE DIHYDRATE 2 PUFF: 160; 4.5 AEROSOL RESPIRATORY (INHALATION) at 19:53

## 2020-10-12 RX ADMIN — HYDROCODONE BITARTRATE AND ACETAMINOPHEN 1 TABLET: 5; 325 TABLET ORAL at 11:23

## 2020-10-12 RX ADMIN — GLIPIZIDE 2.5 MG: 5 TABLET ORAL at 06:29

## 2020-10-12 RX ADMIN — FUROSEMIDE 20 MG: 20 TABLET ORAL at 06:29

## 2020-10-12 RX ADMIN — POTASSIUM CHLORIDE 40 MEQ: 10 CAPSULE, COATED, EXTENDED RELEASE ORAL at 18:52

## 2020-10-12 RX ADMIN — CARVEDILOL 3.12 MG: 3.12 TABLET, FILM COATED ORAL at 09:27

## 2020-10-12 RX ADMIN — LOSARTAN POTASSIUM 25 MG: 25 TABLET, FILM COATED ORAL at 09:27

## 2020-10-12 RX ADMIN — OXYBUTYNIN CHLORIDE 10 MG: 10 TABLET, EXTENDED RELEASE ORAL at 20:27

## 2020-10-12 RX ADMIN — TOBRAMYCIN 300 MG: 1.2 INJECTION, POWDER, LYOPHILIZED, FOR SOLUTION INTRAVENOUS at 19:53

## 2020-10-12 RX ADMIN — WARFARIN 3 MG: 3 TABLET ORAL at 18:52

## 2020-10-12 RX ADMIN — ACETYLCYSTEINE 2 ML: 200 SOLUTION ORAL; RESPIRATORY (INHALATION) at 09:16

## 2020-10-12 RX ADMIN — OXAZEPAM 10 MG: 10 CAPSULE, GELATIN COATED ORAL at 20:27

## 2020-10-12 RX ADMIN — CEFEPIME HYDROCHLORIDE 2 G: 2 INJECTION, POWDER, FOR SOLUTION INTRAVENOUS at 02:41

## 2020-10-12 RX ADMIN — BUDESONIDE AND FORMOTEROL FUMARATE DIHYDRATE 2 PUFF: 160; 4.5 AEROSOL RESPIRATORY (INHALATION) at 09:27

## 2020-10-12 RX ADMIN — HYDROCODONE BITARTRATE AND ACETAMINOPHEN 1 TABLET: 5; 325 TABLET ORAL at 20:27

## 2020-10-12 RX ADMIN — CARVEDILOL 3.12 MG: 3.12 TABLET, FILM COATED ORAL at 20:27

## 2020-10-12 RX ADMIN — GUAIFENESIN 600 MG: 600 TABLET, EXTENDED RELEASE ORAL at 20:27

## 2020-10-12 RX ADMIN — ACETYLCYSTEINE 2 ML: 200 SOLUTION ORAL; RESPIRATORY (INHALATION) at 16:18

## 2020-10-12 RX ADMIN — PANTOPRAZOLE SODIUM 40 MG: 40 TABLET, DELAYED RELEASE ORAL at 11:23

## 2020-10-12 RX ADMIN — CEFEPIME HYDROCHLORIDE 2 G: 2 INJECTION, POWDER, FOR SOLUTION INTRAVENOUS at 13:36

## 2020-10-12 RX ADMIN — TOBRAMYCIN 300 MG: 1.2 INJECTION, POWDER, LYOPHILIZED, FOR SOLUTION INTRAVENOUS at 09:16

## 2020-10-12 RX ADMIN — ASPIRIN 81 MG: 81 TABLET, COATED ORAL at 09:27

## 2020-10-12 RX ADMIN — IPRATROPIUM BROMIDE AND ALBUTEROL SULFATE 3 ML: 2.5; .5 SOLUTION RESPIRATORY (INHALATION) at 12:36

## 2020-10-12 RX ADMIN — IPRATROPIUM BROMIDE AND ALBUTEROL SULFATE 3 ML: 2.5; .5 SOLUTION RESPIRATORY (INHALATION) at 09:16

## 2020-10-12 RX ADMIN — FLUTICASONE PROPIONATE 1 SPRAY: 50 SPRAY, METERED NASAL at 20:28

## 2020-10-12 RX ADMIN — BUDESONIDE AND FORMOTEROL FUMARATE DIHYDRATE 2 PUFF: 160; 4.5 AEROSOL RESPIRATORY (INHALATION) at 09:17

## 2020-10-12 RX ADMIN — FLUTICASONE PROPIONATE 1 SPRAY: 50 SPRAY, METERED NASAL at 09:27

## 2020-10-12 RX ADMIN — ACETYLCYSTEINE 2 ML: 200 SOLUTION ORAL; RESPIRATORY (INHALATION) at 12:36

## 2020-10-12 RX ADMIN — GUAIFENESIN 600 MG: 600 TABLET, EXTENDED RELEASE ORAL at 09:27

## 2020-10-12 RX ADMIN — IPRATROPIUM BROMIDE AND ALBUTEROL SULFATE 3 ML: 2.5; .5 SOLUTION RESPIRATORY (INHALATION) at 16:18

## 2020-10-12 RX ADMIN — ROSUVASTATIN CALCIUM 20 MG: 20 TABLET, FILM COATED ORAL at 20:27

## 2020-10-12 RX ADMIN — HYDROCODONE BITARTRATE AND ACETAMINOPHEN 1 TABLET: 5; 325 TABLET ORAL at 02:40

## 2020-10-12 NOTE — PLAN OF CARE
Problem: Adult Inpatient Plan of Care  Goal: Plan of Care Review  Outcome: Ongoing, Progressing  Flowsheets  Taken 10/12/2020 0515 by Shu Macedo RN  Progress: improving  Plan of Care Reviewed With: patient  Taken 10/11/2020 0357 by Symone Hussein RN  Outcome Summary: Monitor pain,labs,and vitals. Pain controlled with PRN medications per orders. Encouraged pulmonary hygiene. Pt destas with minimal movements-O2 3L NC. Will continue to monitor.  Goal: Patient-Specific Goal (Individualized)  Outcome: Ongoing, Progressing  Goal: Absence of Hospital-Acquired Illness or Injury  Outcome: Ongoing, Progressing  Intervention: Identify and Manage Fall Risk  Recent Flowsheet Documentation  Taken 10/12/2020 0400 by Shu Macedo RN  Safety Promotion/Fall Prevention: safety round/check completed  Taken 10/12/2020 0200 by Shu Macedo RN  Safety Promotion/Fall Prevention: safety round/check completed  Taken 10/12/2020 0000 by Shu Macedo RN  Safety Promotion/Fall Prevention: safety round/check completed  Taken 10/11/2020 2200 by Shu Macedo RN  Safety Promotion/Fall Prevention: safety round/check completed  Taken 10/11/2020 2000 by hSu Macedo RN  Safety Promotion/Fall Prevention:   toileting scheduled   safety round/check completed   room organization consistent   activity supervised   assistive device/personal items within reach   clutter free environment maintained  Intervention: Prevent Skin Injury  Recent Flowsheet Documentation  Taken 10/12/2020 0400 by Shu Macedo RN  Body Position: position changed independently  Taken 10/12/2020 0200 by Shu Macedo RN  Body Position: position changed independently  Taken 10/12/2020 0000 by Shu Macedo RN  Body Position: position changed independently  Taken 10/11/2020 2200 by Shu Macedo RN  Body Position: position changed independently  Taken 10/11/2020 2000 by Shu Macedo RN  Body Position: weight shift assistance  provided  Intervention: Prevent Infection  Recent Flowsheet Documentation  Taken 10/12/2020 0400 by Shu Macedo RN  Infection Prevention:   single patient room provided   rest/sleep promoted  Taken 10/12/2020 0200 by Shu Macedo RN  Infection Prevention:   single patient room provided   rest/sleep promoted  Taken 10/12/2020 0000 by Shu Macedo RN  Infection Prevention:   single patient room provided   rest/sleep promoted  Taken 10/11/2020 2200 by Shu Macedo RN  Infection Prevention:   single patient room provided   rest/sleep promoted  Taken 10/11/2020 2000 by Shu Macedo RN  Infection Prevention:   single patient room provided   rest/sleep promoted  Goal: Optimal Comfort and Wellbeing  Outcome: Ongoing, Progressing  Intervention: Provide Person-Centered Care  Recent Flowsheet Documentation  Taken 10/11/2020 2000 by Shu Macedo RN  Trust Relationship/Rapport:   care explained   choices provided  Goal: Readiness for Transition of Care  Outcome: Ongoing, Progressing   Goal Outcome Evaluation:  Plan of Care Reviewed With: patient  Progress: improving  Outcome Summary: diagnosis of new onset COPD exacerbation. Steroids and bronchodilators started. Patient up to bedside commode, experiences some SOB. 02 set at 2L nasal cannula. Home meds restarted.

## 2020-10-12 NOTE — PROGRESS NOTES
Pharmacy Consult: Warfarin Dosing/ Monitoring    Caitlyn Longoria is a 85 y.o. female, estimated creatinine clearance is 45.7 mL/min (by C-G formula based on SCr of 0.97 mg/dL). weighing 95.7 kg (211 lb).    She has a past medical history of Aortic calcification (CMS/HCC), Aortic regurgitation, Asthma, Atrial fibrillation (CMS/HCC), CAD (coronary artery disease), Chronic combined systolic and diastolic congestive heart failure (CMS/HCC), CKD (chronic kidney disease) stage 3, GFR 30-59 ml/min, Coronary artery disease involving native coronary artery of native heart with angina pectoris (CMS/[facility]), Disc degeneration, lumbar, DM type 2 (diabetes mellitus, type 2) (CMS/[facility]), GERD (gastroesophageal reflux disease), History of aneurysm, History of blood transfusion, History of fracture, History of vitamin D deficiency, Hyperlipidemia, Hypertension, Mild mitral regurgitation, Mitral annular calcification, PAF (paroxysmal atrial fibrillation) (CMS/[facility]), Peripheral neuropathy, Skin cancer, Sleep apnea, SSS (sick sinus syndrome) (CMS/[facility]), Stroke (cerebrum) (CMS/[facility]), and Tricuspid regurgitation.    Social History     Tobacco Use    Smoking status: Never Smoker    Smokeless tobacco: Never Used    Tobacco comment: caffeine use- soda   Substance Use Topics    Alcohol use: Not Currently    Drug use: No       Results from last 7 days   Lab Units 10/12/20  0549 10/11/20  0442 10/10/20  0849 10/09/20  0548 10/08/20  0734 10/07/20  0755 10/06/20  0710   INR  2.22* 2.99* 2.95* 2.28* 1.94* 1.67* 1.62*   HEMOGLOBIN g/dL 11.5*  --  11.6* 11.7* 11.7* 11.6* 11.8*   HEMATOCRIT % 36.4  --  35.8 35.9 36.0 36.9 36.0   PLATELETS 10*3/mm3 170  --  196 212 189 173 171     Results from last 7 days   Lab Units 10/12/20  0549 10/10/20  0849 10/09/20  0548   SODIUM mmol/L 139 137 138   POTASSIUM mmol/L 3.4* 3.9 4.0   CHLORIDE mmol/L 99 97* 97*   CO2 mmol/L 29.6* 31.5* 29.7*   BUN mg/dL 22 21 23   CREATININE mg/dL 0.97 0.87 1.03*   CALCIUM mg/dL 8.4*  8.7 8.9   GLUCOSE mg/dL 130* 139* 164*     Anticoagulation history: h/o atrial fibrillation managed on warfarin through [facility] Anticoagulation clinic. As of 9/23 regimen noted as: Warfarin 2mg Su/T/W/Th/Sa and Warfarin 4mg M/F     Hospital Anticoagulation:  Consulting provider: Dr. Yoo  Start date: 9/30  Indication: Atrial Fibrillation  Target INR: 2-3  Expected duration: Indefinite   Bridge Therapy: No                  Date 9/29 9/30  10/1 10/2  10/3  10/4  10/5    INR 3.41 3.78  3.6 2.31  1.98  1.70  1.59   Warfarin dose 0 0 0  2  mg  3mg  3mg  held per md       Date  10/6  10/7  10/8 10/9 10/10 10/11 10/12   INR  1.62  1.67  1.94 2.28 2.95 2.99 2.22    Warfarin dose  3 mg  3 mg   3 mg 3 mg  1 mg 1 mg  3 mg      Potential drug interactions:    d/c'ed 10/2  - Cefepime: May enhance the anticoagulant effect of warfarin.   - Rosuvastatin (home med, Moderate): May enhance anticoagulant effect of warfarin  - Pantoprazole (Moderate, home med): May enhance anticoagulant effect of warfarin.  - Acetaminophen (prn) - may result in increased risk of bleeding     -- once dose doxy / ctx on 9/29   - ASA: May enhance the anticoagulant effect of warfarin. (home med, reordered on admission)  - Glipizide: Sulfonylureas may enhance the anticoagulant effect of warfarin. Warfarin may enhance the hypoglycemic effect of Sulfonylureas. (home med, reordered on admission)     Relevant nutrition status: Reg diet; % meals     Education complete?/ Date: Not at this time     Assessment/Plan:  INR now at lower end of goal after 2d of lower doses     1) warfarin 3 mg PO daily    2) INR ordered daily with AM labs while inpatient.    Pharmacy will continue to follow until discharge or discontinuation of warfarin.     Catherine Yang RPH  10/12/2020

## 2020-10-12 NOTE — PROGRESS NOTES
Continued Stay Note  [facility]     Patient Name: Caitlyn Longoria  MRN: [id number]  Today's Date: 10/12/2020    Admit Date: 9/29/2020    Discharge Plan     Row Name 10/12/20 1612       Plan    Plan  [facility] [place] [facility]    Provided Post Acute Provider List?  Refused    Patient/Family in Agreement with Plan  yes    Plan Comments  CCP spoke with pt who now thinks she would benefit from skilled rehab prior to returning [place]. CCP spoke with [facility] ([place]) who will follow pt for skilled needs at d/c. [facility] also following. CCP to continue to follow for post hospital needs. Jodee Flynn LCSW        Discharge Codes    No documentation.             Radha Flynn LCSW

## 2020-10-12 NOTE — PROGRESS NOTES
"  Daily Progress Note.   [address]  10/12/2020    Patient:  Name:  Caitlyn Longoria  MRN:  [id number]  1934  85 y.o.  female         CC: felt badly    Interval History:  Follow up for pna, mucus plugging.  Patient feels like she is doing better.  She is actually a productive cough.  She is having some scant hemoptysis blood-tinged sputum.  She feels like this is better or about the same as compared to prior.  She continues take her Coumadin.  She feels like her pain is better controlled.  No pleuritic chest pain.  ROS: No fever, no diarrhea, no chest pain  PMFSSH: no change    Physical Exam:  /63 (BP Location: Left arm, Patient Position: Sitting)   Pulse 79   Temp 97.6 °F (36.4 °C) (Oral)   Resp 18   Ht 157.5 cm (62.01\")   Wt 95.7 kg (211 lb)   SpO2 97%   BMI 38.58 kg/m²   Body mass index is 38.58 kg/m².    Intake/Output Summary (Last 24 hours) at 10/12/2020 1848  Last data filed at 10/12/2020 1810  Gross per 24 hour   Intake 1080 ml   Output 1300 ml   Net -220 ml     General appearance: ill but non toxic, conversant   Eyes: anicteric sclerae, moist conjunctivae; no lid-lag; PERRLA  HENT: Atraumatic; oropharynx mmm no lesion  Neck: Trachea midline; FROM, supple, no thyromegaly or lymphadenopathy  Lungs: bilateral air entry, no wheeze  No focal crackles, sigrhonchi through through lung fields, with normal respiratory effort and no intercostal retractions  CV: RRR, no rub  Abdomen: Soft, non-tender; no masses or HSM +obese  Extremities: No peripheral edema or extremity lymphadenopathy  Skin: Normal temperature, turgor and texture; no rash, ulcers or subcutaneous nodules multiple areas of ecchymosis  Psych: Appropriate affect, alert and oriented to person, place and time    Data Review:  Notable Labs:  Results from last 7 days   Lab Units 10/12/20  0549 10/10/20  0849 10/09/20  0548 10/08/20  0734 10/07/20  0755 10/06/20  0710   WBC 10*3/mm3 13.57* 15.36* 20.49* 16.55* 14.07* " 16.15*   HEMOGLOBIN g/dL 11.5* 11.6* 11.7* 11.7* 11.6* 11.8*   PLATELETS 10*3/mm3 170 196 212 189 173 171     Results from last 7 days   Lab Units 10/12/20  0549 10/10/20  0849 10/09/20  0548 10/08/20  0734 10/07/20  0755 10/06/20  0710   SODIUM mmol/L 139 137 138 137 135* 139   POTASSIUM mmol/L 3.4* 3.9 4.0 4.2 4.0 3.9   CHLORIDE mmol/L 99 97* 97* 97* 97* 99   CO2 mmol/L 29.6* 31.5* 29.7* 28.4 30.9* 30.8*   BUN mg/dL 22 21 23 21 17 16   CREATININE mg/dL 0.97 0.87 1.03* 0.85 0.83 0.78   GLUCOSE mg/dL 130* 139* 164* 279* 147* 150*   CALCIUM mg/dL 8.4* 8.7 8.9 8.5* 8.2* 8.4*   Estimated Creatinine Clearance: 45.7 mL/min (by C-G formula based on SCr of 0.97 mg/dL).    Results from last 7 days   Lab Units 10/12/20  0549 10/10/20  0849 10/09/20  0548   PROCALCITONIN ng/mL 0.06  --   --    PLATELETS 10*3/mm3 170 196 212       Results from last 7 days   Lab Units 10/07/20  1555   PH, ARTERIAL pH units 7.397   PCO2, ARTERIAL mm Hg 46.9*   PO2 ART mm Hg 62.4*   HCO3 ART mmol/L 28.9*       Imaging:  Reviewed chest images personally from past 3 days    Scheduled meds:    acetylcysteine, 2 mL, Nebulization, 4x Daily - RT  aspirin, 81 mg, Oral, Daily  budesonide-formoterol, 2 puff, Inhalation, BID - RT  carvedilol, 3.125 mg, Oral, BID  cefepime, 2 g, Intravenous, Q12H  fluticasone, 1 spray, Nasal, BID  furosemide, 20 mg, Oral, QAM  glipizide, 2.5 mg, Oral, QAM  guaiFENesin, 600 mg, Oral, Q12H  insulin lispro, 0-14 Units, Subcutaneous, TID AC  ipratropium-albuterol, 3 mL, Nebulization, 4x Daily - RT  losartan, 25 mg, Oral, Daily  montelukast, 10 mg, Oral, Nightly  oxazepam, 10 mg, Oral, Nightly  oxybutynin XL, 10 mg, Oral, Nightly  pantoprazole, 40 mg, Oral, Daily  potassium chloride, 40 mEq, Oral, Once  rosuvastatin, 20 mg, Oral, Nightly  tobramycin, 300 mg, Nebulization, BID - RT  warfarin, 3 mg, Oral, Daily        ASSESSMENT  /  PLAN:  1. COPD/asthma with mild exacerbation  2. Right lower lobe pneumonia with heavy growth of  Pseudomonas on cultures  3. Chronic bronchitis  4. Acute hypoxemic respiratory failure  5. Elevated left hemidiaphragm: Chronic  6. Ischemic cardiomyopathy  7. Diastolic dysfunction  8. Mitral regurgitation  9. Atrial fibrillation and sick sinus syndrome  10. CLL  11. 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 Hypertension  13. Diabetes  14. CHARLENE on BiPAP  15. Immunodeficiency       Continue current therapy  Suspect she will need nebulize tobramycin intermittently potentially long-term.  We will get this set up for discharge.  Cont abx  Cont chest pt  Encouraged flutter  Cont nebs    Strongly encouraged her to cough forcefully to help clear her secretions.      Cam Suarez MD  [facility]  10/12/20  3049

## 2020-10-12 NOTE — NURSING NOTE
VSS. Pain controlled with prn pain medication, continue to encourage pulmonary toliet and walking. No S/S of discomfort or distress. Will continue to monitor

## 2020-10-12 NOTE — PROGRESS NOTES
"DAILY PROGRESS NOTE  [facility]    Patient Identification:  Name: Caitlyn Longoria  Age: 85 y.o.  Sex: female  :  1934  MRN: [id number]         Primary Care Physician: Zenaida Suarez MD      Subjective  Patient is holding her nose because of a nosebleed and is off oxygen.  She is maintaining O2 sats in the low 90s.    Objective:  General Appearance:  Comfortable, well-appearing, in no acute distress and not in pain (Obese.).    Vital signs: (most recent): Blood pressure 114/63, pulse 79, temperature 97.6 °F (36.4 °C), temperature source Oral, resp. rate 18, height 157.5 cm (62.01\"), weight 95.7 kg (211 lb), SpO2 97 %, not currently breastfeeding.    Lungs:  Normal respiratory rate.  She is not in respiratory distress.  There are decreased breath sounds.    Heart: Normal rate.  Regular rhythm.    Extremities: There is dependent edema.    Neurological: Patient is alert and oriented to person, place and time.    Skin:  Warm and dry.                Vital signs in last 24 hours:  Temp:  [97.6 °F (36.4 °C)-97.8 °F (36.6 °C)] 97.6 °F (36.4 °C)  Heart Rate:  [78-84] 79  Resp:  [16-22] 18  BP: ()/(55-78) 114/63    Intake/Output:    Intake/Output Summary (Last 24 hours) at 10/12/2020 1825  Last data filed at 10/12/2020 1810  Gross per 24 hour   Intake 1080 ml   Output 1300 ml   Net -220 ml         Results from last 7 days   Lab Units 10/12/20  0549 10/10/20  0849 10/09/20  0548 10/08/20  0734 10/07/20  0755 10/06/20  0710   WBC 10*3/mm3 13.57* 15.36* 20.49* 16.55* 14.07* 16.15*   HEMOGLOBIN g/dL 11.5* 11.6* 11.7* 11.7* 11.6* 11.8*   PLATELETS 10*3/mm3 170 196 212 189 173 171     Results from last 7 days   Lab Units 10/12/20  0549 10/10/20  0849 10/09/20  0548 10/08/20  0734 10/07/20  0755 10/06/20  0710   SODIUM mmol/L 139 137 138 137 135* 139   POTASSIUM mmol/L 3.4* 3.9 4.0 4.2 4.0 3.9   CHLORIDE mmol/L 99 97* 97* 97* 97* 99   CO2 mmol/L 29.6* 31.5* 29.7* 28.4 30.9* 30.8*   BUN " mg/dL 22 21 23 21 17 16   CREATININE mg/dL 0.97 0.87 1.03* 0.85 0.83 0.78   GLUCOSE mg/dL 130* 139* 164* 279* 147* 150*   Estimated Creatinine Clearance: 45.7 mL/min (by C-G formula based on SCr of 0.97 mg/dL).  Results from last 7 days   Lab Units 10/12/20  0549 10/10/20  0849 10/09/20  0548 10/08/20  0734 10/07/20  0755 10/06/20  0710   CALCIUM mg/dL 8.4* 8.7 8.9 8.5* 8.2* 8.4*         Assessment:  Chronic bronchitis: Severe with pseudomonas aeruginosa colonization versus persistent infection.  Pulmonary, ID input appreciated.  Chest x-ray unchanged.  Atelectasis: Monitor for evolving pneumonia.  Acute hypoxic respiratory failure.  Persistent versus recurrent rhinovirus infection:  Chronic left hemidiaphragm paralysis.  Diabetes type 2:  Paroxysmal atrial fibrillation:  Chronic kidney disease stage III:  Morbid obesity:  Chronic anticoagulation, Coumadin:  Closed wedge compression fraction, T5 vertebrae: Conservative management.  Neurosurgical input appreciated.  CLL:  Pulmonary hypertension:  Systemic hypertension:  Obstructive sleep apnea:  Epistaxis: Humidify oxygen.  Ocean nasal spray at bedside.      Plan:  Please see above.  Appears to be slow improvement.    Rd Yoo MD  10/12/2020  18:25 EDT

## 2020-10-13 ENCOUNTER — APPOINTMENT (OUTPATIENT)
Dept: GENERAL RADIOLOGY | Facility: HOSPITAL | Age: 85
End: 2020-10-13

## 2020-10-13 LAB
ANION GAP SERPL CALCULATED.3IONS-SCNC: 9.1 MMOL/L (ref 5–15)
BASOPHILS # BLD AUTO: 0.01 10*3/MM3 (ref 0–0.2)
BASOPHILS NFR BLD AUTO: 0.1 % (ref 0–1.5)
BUN SERPL-MCNC: 19 MG/DL (ref 8–23)
BUN/CREAT SERPL: 21.1 (ref 7–25)
CALCIUM SPEC-SCNC: 8.4 MG/DL (ref 8.6–10.5)
CHLORIDE SERPL-SCNC: 101 MMOL/L (ref 98–107)
CO2 SERPL-SCNC: 27.9 MMOL/L (ref 22–29)
CREAT SERPL-MCNC: 0.9 MG/DL (ref 0.57–1)
DEPRECATED RDW RBC AUTO: 44.4 FL (ref 37–54)
EOSINOPHIL # BLD AUTO: 0 10*3/MM3 (ref 0–0.4)
EOSINOPHIL NFR BLD AUTO: 0 % (ref 0.3–6.2)
ERYTHROCYTE [DISTWIDTH] IN BLOOD BY AUTOMATED COUNT: 12.9 % (ref 12.3–15.4)
GFR SERPL CREATININE-BSD FRML MDRD: 60 ML/MIN/1.73
GLUCOSE BLDC GLUCOMTR-MCNC: 131 MG/DL (ref 70–130)
GLUCOSE BLDC GLUCOMTR-MCNC: 154 MG/DL (ref 70–130)
GLUCOSE BLDC GLUCOMTR-MCNC: 168 MG/DL (ref 70–130)
GLUCOSE BLDC GLUCOMTR-MCNC: 177 MG/DL (ref 70–130)
GLUCOSE SERPL-MCNC: 144 MG/DL (ref 65–99)
HCT VFR BLD AUTO: 35.7 % (ref 34–46.6)
HGB BLD-MCNC: 11.3 G/DL (ref 12–15.9)
IMM GRANULOCYTES # BLD AUTO: 0.1 10*3/MM3 (ref 0–0.05)
IMM GRANULOCYTES NFR BLD AUTO: 0.9 % (ref 0–0.5)
INR PPP: 2.02 (ref 0.9–1.1)
LYMPHOCYTES # BLD AUTO: 6.59 10*3/MM3 (ref 0.7–3.1)
LYMPHOCYTES NFR BLD AUTO: 61.6 % (ref 19.6–45.3)
MCH RBC QN AUTO: 30.1 PG (ref 26.6–33)
MCHC RBC AUTO-ENTMCNC: 31.7 G/DL (ref 31.5–35.7)
MCV RBC AUTO: 94.9 FL (ref 79–97)
MONOCYTES # BLD AUTO: 0.29 10*3/MM3 (ref 0.1–0.9)
MONOCYTES NFR BLD AUTO: 2.7 % (ref 5–12)
NEUTROPHILS NFR BLD AUTO: 3.7 10*3/MM3 (ref 1.7–7)
NEUTROPHILS NFR BLD AUTO: 34.7 % (ref 42.7–76)
NRBC BLD AUTO-RTO: 0 /100 WBC (ref 0–0.2)
OVALOCYTES BLD QL SMEAR: NORMAL
PLAT MORPH BLD: NORMAL
PLATELET # BLD AUTO: 174 10*3/MM3 (ref 140–450)
PMV BLD AUTO: 9.8 FL (ref 6–12)
POTASSIUM SERPL-SCNC: 3.5 MMOL/L (ref 3.5–5.2)
PROTHROMBIN TIME: 22.3 SECONDS (ref 11.7–14.2)
RBC # BLD AUTO: 3.76 10*6/MM3 (ref 3.77–5.28)
SMUDGE CELLS BLD QL SMEAR: NORMAL
SODIUM SERPL-SCNC: 138 MMOL/L (ref 136–145)
WBC # BLD AUTO: 10.69 10*3/MM3 (ref 3.4–10.8)

## 2020-10-13 PROCEDURE — 85007 BL SMEAR W/DIFF WBC COUNT: CPT | Performed by: INTERNAL MEDICINE

## 2020-10-13 PROCEDURE — 71045 X-RAY EXAM CHEST 1 VIEW: CPT

## 2020-10-13 PROCEDURE — 97110 THERAPEUTIC EXERCISES: CPT

## 2020-10-13 PROCEDURE — 80048 BASIC METABOLIC PNL TOTAL CA: CPT | Performed by: HOSPITALIST

## 2020-10-13 PROCEDURE — 94799 UNLISTED PULMONARY SVC/PX: CPT

## 2020-10-13 PROCEDURE — 71046 X-RAY EXAM CHEST 2 VIEWS: CPT

## 2020-10-13 PROCEDURE — 85025 COMPLETE CBC W/AUTO DIFF WBC: CPT | Performed by: INTERNAL MEDICINE

## 2020-10-13 PROCEDURE — 82962 GLUCOSE BLOOD TEST: CPT

## 2020-10-13 PROCEDURE — 63710000001 INSULIN LISPRO (HUMAN) PER 5 UNITS: Performed by: HOSPITALIST

## 2020-10-13 PROCEDURE — 25010000002 CEFEPIME 2 G/NS 100 ML SOLUTION: Performed by: INTERNAL MEDICINE

## 2020-10-13 PROCEDURE — 85610 PROTHROMBIN TIME: CPT | Performed by: HOSPITALIST

## 2020-10-13 PROCEDURE — 25010000002 CEFEPIME PER 500 MG: Performed by: INTERNAL MEDICINE

## 2020-10-13 PROCEDURE — 63710000001 TOBRAMYCIN PER 300 MG: Performed by: INTERNAL MEDICINE

## 2020-10-13 RX ORDER — CEPHALEXIN 500 MG/1
500 CAPSULE ORAL 3 TIMES DAILY
Status: DISCONTINUED | OUTPATIENT
Start: 2020-10-13 | End: 2020-10-14 | Stop reason: HOSPADM

## 2020-10-13 RX ADMIN — ASPIRIN 81 MG: 81 TABLET, COATED ORAL at 08:30

## 2020-10-13 RX ADMIN — CEFEPIME HYDROCHLORIDE 2 G: 2 INJECTION, POWDER, FOR SOLUTION INTRAVENOUS at 03:00

## 2020-10-13 RX ADMIN — TOBRAMYCIN 300 MG: 1.2 INJECTION, POWDER, LYOPHILIZED, FOR SOLUTION INTRAVENOUS at 20:23

## 2020-10-13 RX ADMIN — ROSUVASTATIN CALCIUM 20 MG: 20 TABLET, FILM COATED ORAL at 21:05

## 2020-10-13 RX ADMIN — GLIPIZIDE 2.5 MG: 5 TABLET ORAL at 06:55

## 2020-10-13 RX ADMIN — ACETYLCYSTEINE 2 ML: 200 SOLUTION ORAL; RESPIRATORY (INHALATION) at 20:22

## 2020-10-13 RX ADMIN — IPRATROPIUM BROMIDE AND ALBUTEROL SULFATE 3 ML: 2.5; .5 SOLUTION RESPIRATORY (INHALATION) at 16:16

## 2020-10-13 RX ADMIN — GUAIFENESIN 600 MG: 600 TABLET, EXTENDED RELEASE ORAL at 08:30

## 2020-10-13 RX ADMIN — CARVEDILOL 3.12 MG: 3.12 TABLET, FILM COATED ORAL at 21:05

## 2020-10-13 RX ADMIN — HYDROCODONE BITARTRATE AND ACETAMINOPHEN 1 TABLET: 5; 325 TABLET ORAL at 13:55

## 2020-10-13 RX ADMIN — LOSARTAN POTASSIUM 25 MG: 25 TABLET, FILM COATED ORAL at 08:30

## 2020-10-13 RX ADMIN — CEPHALEXIN 500 MG: 500 CAPSULE ORAL at 21:06

## 2020-10-13 RX ADMIN — FUROSEMIDE 20 MG: 20 TABLET ORAL at 06:55

## 2020-10-13 RX ADMIN — ACETYLCYSTEINE 2 ML: 200 SOLUTION ORAL; RESPIRATORY (INHALATION) at 16:16

## 2020-10-13 RX ADMIN — FLUTICASONE PROPIONATE 1 SPRAY: 50 SPRAY, METERED NASAL at 21:06

## 2020-10-13 RX ADMIN — HYDROCODONE BITARTRATE AND ACETAMINOPHEN 1 TABLET: 5; 325 TABLET ORAL at 06:55

## 2020-10-13 RX ADMIN — INSULIN LISPRO 3 UNITS: 100 INJECTION, SOLUTION INTRAVENOUS; SUBCUTANEOUS at 17:32

## 2020-10-13 RX ADMIN — HYDROCODONE BITARTRATE AND ACETAMINOPHEN 1 TABLET: 5; 325 TABLET ORAL at 21:05

## 2020-10-13 RX ADMIN — ACETYLCYSTEINE 2 ML: 200 SOLUTION ORAL; RESPIRATORY (INHALATION) at 12:39

## 2020-10-13 RX ADMIN — WARFARIN 3 MG: 3 TABLET ORAL at 17:32

## 2020-10-13 RX ADMIN — FLUTICASONE PROPIONATE 1 SPRAY: 50 SPRAY, METERED NASAL at 08:30

## 2020-10-13 RX ADMIN — OXYBUTYNIN CHLORIDE 10 MG: 10 TABLET, EXTENDED RELEASE ORAL at 21:05

## 2020-10-13 RX ADMIN — TOBRAMYCIN 300 MG: 1.2 INJECTION, POWDER, LYOPHILIZED, FOR SOLUTION INTRAVENOUS at 08:40

## 2020-10-13 RX ADMIN — BUDESONIDE AND FORMOTEROL FUMARATE DIHYDRATE 2 PUFF: 160; 4.5 AEROSOL RESPIRATORY (INHALATION) at 08:40

## 2020-10-13 RX ADMIN — INSULIN LISPRO 3 UNITS: 100 INJECTION, SOLUTION INTRAVENOUS; SUBCUTANEOUS at 12:07

## 2020-10-13 RX ADMIN — IPRATROPIUM BROMIDE AND ALBUTEROL SULFATE 3 ML: 2.5; .5 SOLUTION RESPIRATORY (INHALATION) at 12:38

## 2020-10-13 RX ADMIN — IPRATROPIUM BROMIDE AND ALBUTEROL SULFATE 3 ML: 2.5; .5 SOLUTION RESPIRATORY (INHALATION) at 08:40

## 2020-10-13 RX ADMIN — OXAZEPAM 10 MG: 10 CAPSULE, GELATIN COATED ORAL at 21:05

## 2020-10-13 RX ADMIN — PANTOPRAZOLE SODIUM 40 MG: 40 TABLET, DELAYED RELEASE ORAL at 08:30

## 2020-10-13 RX ADMIN — CEFEPIME HYDROCHLORIDE 2 G: 2 INJECTION, POWDER, FOR SOLUTION INTRAVENOUS at 13:55

## 2020-10-13 RX ADMIN — IPRATROPIUM BROMIDE AND ALBUTEROL SULFATE 3 ML: 2.5; .5 SOLUTION RESPIRATORY (INHALATION) at 20:22

## 2020-10-13 RX ADMIN — GUAIFENESIN 600 MG: 600 TABLET, EXTENDED RELEASE ORAL at 21:05

## 2020-10-13 RX ADMIN — MONTELUKAST SODIUM 10 MG: 10 TABLET, FILM COATED ORAL at 21:06

## 2020-10-13 RX ADMIN — ACETYLCYSTEINE 2 ML: 200 SOLUTION ORAL; RESPIRATORY (INHALATION) at 08:40

## 2020-10-13 RX ADMIN — CARVEDILOL 3.12 MG: 3.12 TABLET, FILM COATED ORAL at 08:30

## 2020-10-13 NOTE — PROGRESS NOTES
Patient still in radiology department.  We will try to see her tomorrow.  Discussed with pulmonology.

## 2020-10-13 NOTE — PROGRESS NOTES
Continued Stay Note  [facility]     Patient Name: Caitlyn Longoria  MRN: [id number]  Today's Date: 10/13/2020    Admit Date: 9/29/2020    Discharge Plan     Row Name 10/13/20 1455       Plan    Plan  [facility] tomorrow, 10/14, via [facility] w[facility]c van 4:00PM    Patient/Family in Agreement with Plan  yes    Plan Comments  Per RN (Chaya), pulm ordering chest xray, d/c likely tomorrow, 10/14. CCP updated Liz/Latonya who confirms facility can accept for skilled rehab tomorrow. CCP moved [facility] transport (ID [id number]) to tomorrow, 10/14, at 4:00 PM. [facility] can provide O2 for transport. Packet on pt chart. Jodee Flynn LCSW        Discharge Codes    No documentation.             Radha Flynn LCSW

## 2020-10-13 NOTE — PROGRESS NOTES
"  Daily Progress Note.   [address]  10/13/2020    Patient:  Name:  Caitlyn Longoria  MRN:  [id number]  1934  85 y.o.  female         CC: felt badly    Interval History:  Follow up for pna, mucus plugging.  Feels worse today  Worse cough.  She is able to carry on conversation, she seems more discouraged and less upbeat today than prior.  She expressed frustration with her chronic medical conditions.  No worsening hemoptysis. Tolerating nebs.  Not much sputum production today but says she was able to cough up some stuff this am.    ROS: No fever, no diarrhea, no chest pain  PMFSSH: no change    Physical Exam:  /77 (BP Location: Left arm, Patient Position: Sitting)   Pulse 79   Temp 97.5 °F (36.4 °C) (Oral)   Resp 22   Ht 157.5 cm (62.01\")   Wt 95.7 kg (211 lb)   SpO2 95%   BMI 38.58 kg/m²   Body mass index is 38.58 kg/m².    Intake/Output Summary (Last 24 hours) at 10/13/2020 1440  Last data filed at 10/13/2020 0422  Gross per 24 hour   Intake 240 ml   Output 900 ml   Net -660 ml     General appearance: ill but non toxic, conversant   Eyes: anicteric sclerae, moist conjunctivae; no lid-lag; PERRLA  HENT: Atraumatic; oropharynx mmm no lesion  Neck: Trachea midline; FROM, supple, no thyromegaly or lymphadenopathy  Lungs: bilateral air entry, no wheeze  No focal crackles, less rhonchi through lung fields, with normal respiratory effort and no intercostal retractions  CV: RRR, no rub  Abdomen: Soft, non-tender; no masses or HSM +obese  Extremities: No peripheral edema or extremity lymphadenopathy  Skin: Normal temperature, turgor and texture; no rash, ulcers or subcutaneous nodules multiple areas of ecchymosis  Psych: Appropriate affect, alert and oriented to person, place and time    Data Review:  Notable Labs:  Results from last 7 days   Lab Units 10/13/20  0543 10/12/20  0549 10/10/20  0849 10/09/20  0548 10/08/20  0734 10/07/20  0755   WBC 10*3/mm3 10.69 13.57* 15.36* 20.49* " 16.55* 14.07*   HEMOGLOBIN g/dL 11.3* 11.5* 11.6* 11.7* 11.7* 11.6*   PLATELETS 10*3/mm3 174 170 196 212 189 173     Results from last 7 days   Lab Units 10/13/20  0543 10/12/20  0549 10/10/20  0849 10/09/20  0548 10/08/20  0734 10/07/20  0755   SODIUM mmol/L 138 139 137 138 137 135*   POTASSIUM mmol/L 3.5 3.4* 3.9 4.0 4.2 4.0   CHLORIDE mmol/L 101 99 97* 97* 97* 97*   CO2 mmol/L 27.9 29.6* 31.5* 29.7* 28.4 30.9*   BUN mg/dL 19 22 21 23 21 17   CREATININE mg/dL 0.90 0.97 0.87 1.03* 0.85 0.83   GLUCOSE mg/dL 144* 130* 139* 164* 279* 147*   CALCIUM mg/dL 8.4* 8.4* 8.7 8.9 8.5* 8.2*   Estimated Creatinine Clearance: 49.3 mL/min (by C-G formula based on SCr of 0.9 mg/dL).    Results from last 7 days   Lab Units 10/13/20  0543 10/12/20  0549 10/10/20  0849   PROCALCITONIN ng/mL  --  0.06  --    PLATELETS 10*3/mm3 174 170 196       Results from last 7 days   Lab Units 10/07/20  1555   PH, ARTERIAL pH units 7.397   PCO2, ARTERIAL mm Hg 46.9*   PO2 ART mm Hg 62.4*   HCO3 ART mmol/L 28.9*       Imaging:  Reviewed chest images personally from past 3 days    Scheduled meds:    acetylcysteine, 2 mL, Nebulization, 4x Daily - RT  aspirin, 81 mg, Oral, Daily  budesonide-formoterol, 2 puff, Inhalation, BID - RT  carvedilol, 3.125 mg, Oral, BID  cefepime, 2 g, Intravenous, Q12H  cephalexin, 500 mg, Oral, TID  fluticasone, 1 spray, Nasal, BID  furosemide, 20 mg, Oral, QAM  glipizide, 2.5 mg, Oral, QAM  guaiFENesin, 600 mg, Oral, Q12H  insulin lispro, 0-14 Units, Subcutaneous, TID AC  ipratropium-albuterol, 3 mL, Nebulization, 4x Daily - RT  losartan, 25 mg, Oral, Daily  montelukast, 10 mg, Oral, Nightly  oxazepam, 10 mg, Oral, Nightly  oxybutynin XL, 10 mg, Oral, Nightly  pantoprazole, 40 mg, Oral, Daily  rosuvastatin, 20 mg, Oral, Nightly  tobramycin, 300 mg, Nebulization, BID - RT  warfarin, 3 mg, Oral, Daily        ASSESSMENT  /  PLAN:  1. COPD/asthma with mild exacerbation  2. Right lower lobe pneumonia with heavy growth of  Pseudomonas on cultures  3. Chronic bronchitis  4. Acute hypoxemic respiratory failure  5. Elevated left hemidiaphragm: Chronic  6. Ischemic cardiomyopathy  7. Diastolic dysfunction  8. Mitral regurgitation  9. Atrial fibrillation and sick sinus syndrome  10. CLL  11. 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 Hypertension  13. Diabetes  14. CHARLENE on BiPAP  15. Immunodeficiency       Continue current therapy  Suspect she will need nebulize tobramycin intermittently potentially long-term.  We will get this set up for discharge and defer further to Dr Coburn.  Completes cefepime today, transition to her home oral abx for proph from ortho perspective it seems  Cont chest pt  Encouraged flutter  Cont nebs  cxr today.  If looks good would continue strengthening and care at SNF.    Offered encouragement support.    Cam Suarez MD  [facility]  10/13/20  1427

## 2020-10-13 NOTE — THERAPY TREATMENT NOTE
Patient Name: Caitlyn Longoria  : 1934    MRN: [id number]                              Today's Date: 10/13/2020       Admit Date: 2020    Visit Dx:     ICD-10-CM ICD-9-CM   1. Pneumonia due to infectious organism, unspecified laterality, unspecified part of lung  J18.9 486   2. Dyspnea, unspecified type  R06.00 786.09   3. Leukocytosis, unspecified type  D72.829 288.60   4. Pneumonia due to gram-negative bacteria (CMS/[facility])  J15.6 482.83     Patient Active Problem List   Diagnosis   • Neck and shoulder pain   • Arthropathy of shoulder region   • Carpal tunnel syndrome of left wrist   • Chronic pain of both shoulders   • Chronic left shoulder pain   • Arthropathy of left shoulder   • Dysarthria   • DM II (diabetes mellitus, type II), controlled (CMS/[facility])   • Paroxysmal atrial fibrillation (CMS/[facility])   • HLD (hyperlipidemia)   • Chronic bronchitis (CMS/[facility])   • Coronary artery disease involving native coronary artery of native heart with angina pectoris (CMS/[facility])   • CVA (cerebral vascular accident) (CMS/[facility])   • HTN (hypertension)   • CKD (chronic kidney disease), stage III   • Chronic combined systolic and diastolic congestive heart failure (CMS/[facility])   • Infection of prosthetic right knee joint (CMS/[facility])   • Stasis dermatitis of right lower extremity due to peripheral venous hypertension   • Current use of long term anticoagulation   • Morbid obesity (CMS/[facility])   • Acute respiratory failure with hypoxia (CMS/[facility])   • Rhinovirus   • Asthma with acute exacerbation   • Acute respiratory failure with hypoxemia (CMS/[facility])   • Viral pneumonia   • Hypoxia   • CLL (chronic lymphoid leukemia) in relapse (CMS/[facility])   • Dyspnea   • Anticoagulated on Coumadin   • Closed wedge compression fracture of T5 vertebra (CMS/[facility])   • Pneumonia due to gram-negative bacteria (CMS/[facility])   • Pneumonia due to infectious organism     Past Medical History:   Diagnosis Date   • Aortic calcification (CMS/[facility])     mild, on echo 2017    • Aortic regurgitation     Trace   • Asthma    • Atrial fibrillation (CMS/[facility])    • CAD (coronary artery disease)    • Chronic combined systolic and diastolic congestive heart failure (CMS/[facility])    • CKD (chronic kidney disease) stage 3, GFR 30-59 ml/min    • Coronary artery disease involving native coronary artery of native heart with angina pectoris (CMS/[facility])    • Disc degeneration, lumbar    • DM type 2 (diabetes mellitus, type 2) (CMS/[facility])    • GERD (gastroesophageal reflux disease)    • History of aneurysm     right femoral artery s/p LHC   • History of blood transfusion    • History of fracture    • History of vitamin D deficiency    • Hyperlipidemia    • Hypertension    • Mild mitral regurgitation    • Mitral annular calcification     12/8/2017- echo, moderate   • PAF (paroxysmal atrial fibrillation) (CMS/[facility])    • Peripheral neuropathy    • Skin cancer     Left hand   • Sleep apnea    • SSS (sick sinus syndrome) (CMS/[facility])    • Stroke (cerebrum) (CMS/[facility])    • Tricuspid regurgitation     Trace     Past Surgical History:   Procedure Laterality Date   • BRONCHOSCOPY Bilateral 10/6/2020    Procedure: BRONCHOSCOPY with BILATERAL LUNG washings;  Surgeon: Juno Coburn MD;  Location: [facility] ENDOSCOPY;  Service: Pulmonary;  Laterality: Bilateral;  PRE: purulent bronchitis  POST: PURULENT BRONCHITIS   • BRONCHOSCOPY Bilateral 10/9/2020    Procedure: BRONCHOSCOPY with washing;  Surgeon: Juno Coburn MD;  Location: [facility] ENDOSCOPY;  Service: Pulmonary;  Laterality: Bilateral;  pre/post - mucous plug     • CARDIAC CATHETERIZATION     • CARDIAC ELECTROPHYSIOLOGY PROCEDURE N/A 2/7/2020    Procedure: PPM generator change - dual  medtronic;  Surgeon: Kiel Field MD;  Location: [facility] CATH INVASIVE LOCATION;  Service: Cardiology;  Laterality: N/A;   • CHOLECYSTECTOMY     • CORONARY STENT PLACEMENT     • HERNIA REPAIR      hital hernia   • HYSTERECTOMY     • PACEMAKER IMPLANTATION     • REPLACEMENT TOTAL KNEE  "Bilateral      General Information     Row Name 10/13/20 1600          Physical Therapy Time and Intention    Document Type  therapy note (daily note)  -     Mode of Treatment  individual therapy;physical therapy  -     Row Name 10/13/20 1600          General Information    Patient Profile Reviewed  yes  -     Existing Precautions/Restrictions  fall;oxygen therapy device and L/min 3L  -     Row Name 10/13/20 1600          Safety Issues, Functional Mobility    Safety Issues Affecting Function (Mobility)  insight into deficits/self-awareness;judgment  -     Impairments Affecting Function (Mobility)  endurance/activity tolerance;shortness of breath;postural/trunk control  -     Comment, Safety Issues/Impairments (Mobility)  pt concerned about not being able to \"climb\" into [employer] van for DC, pt also on 3L O2, de-SATS 91% on 3L with amb initially upon return to room CCP working on alternate ride to [facility], O2 for transport  -       User Key  (r) = Recorded By, (t) = Taken By, (c) = Cosigned By    Initials Name Provider Type     Jaylin Saunders PTA Physical Therapy Assistant        Mobility     Row Name 10/13/20 1604          Sit-Stand Transfer    Sit-Stand Gilbert (Transfers)  minimum assist (75% patient effort) required 2 attempts  -     Assistive Device (Sit-Stand Transfers)  walker, front-wheeled  -     Row Name 10/13/20 1604          Gait/Stairs (Locomotion)    Gilbert Level (Gait)  contact guard;verbal cues  -     Assistive Device (Gait)  walker, front-wheeled  -     Distance in Feet (Gait)  75ft x2, required standing rest to catch breath, usu does not ask for a break; states diff breathing today; incr coughing  -     Deviations/Abnormal Patterns (Gait)  jeremi decreased;stride length decreased corrected step length w/cues  -     Bilateral Gait Deviations  forward flexed posture  -       User Key  (r) = Recorded By, (t) = Taken By, (c) = Cosigned By    Initials Name " Provider Type    Jaylin Whittaker PTA Physical Therapy Assistant        Obj/Interventions    No documentation.       Goals/Plan    No documentation.       Clinical Impression     Row Name 10/13/20 1607          Pain Scale: Numbers Pre/Post-Treatment    Pretreatment Pain Rating  0/10 - no pain  -     Posttreatment Pain Rating  0/10 - no pain  -     Row Name 10/13/20 1607          Plan of Care Review    Plan of Care Reviewed With  patient  -     Outcome Summary  pt enjoys amb, but not feeling as well today; required one rest break during amb today due to SOA; plans SNU soon; CCP assisting w/transportation issues and O2 to facility  -     Row Name 10/13/20 1607          Therapy Assessment/Plan (PT)    Criteria for Skilled Interventions Met (PT)  yes  -     Row Name 10/13/20 1607          Vital Signs    Pre SpO2 (%)  96  -     O2 Delivery Pre Treatment  supplemental O2  -JM     Intra SpO2 (%)  91  -JM     O2 Delivery Intra Treatment  supplemental O2  -JM     Post SpO2 (%)  93  -JM     O2 Delivery Post Treatment  supplemental O2  -JM     Pre Patient Position  Sitting  -     Intra Patient Position  Sitting  -     Post Patient Position  Sitting  -     Rest Breaks   1  -     Row Name 10/13/20 1607          Positioning and Restraints    Pre-Treatment Position  sitting in chair/recliner  -JM     Post Treatment Position  chair  -JM     In Bed  -- pt w/feet on bedside table ledge for lunch  -JM     In Chair  notified nsg;sitting;call light within reach;encouraged to call for assist;exit alarm on  -       User Key  (r) = Recorded By, (t) = Taken By, (c) = Cosigned By    Initials Name Provider Type    Jaylin Whittaker PTA Physical Therapy Assistant        Outcome Measures     Row Name 10/13/20 1611          How much help from another person do you currently need...    Turning from your back to your side while in flat bed without using bedrails?  3  -     Moving from lying on back to sitting on the  side of a flat bed without bedrails?  3  -JM     Moving to and from a bed to a chair (including a wheelchair)?  3  -JM     Standing up from a chair using your arms (e.g., wheelchair, bedside chair)?  3  -JM     Climbing 3-5 steps with a railing?  1  -JM     To walk in hospital room?  3  -JM     AM-PAC 6 Clicks Score (PT)  16  -JM       User Key  (r) = Recorded By, (t) = Taken By, (c) = Cosigned By    Initials Name Provider Type    Jaylin Whittaker PTA Physical Therapy Assistant        Physical Therapy Education                 Title: PT OT SLP Therapies (Done)     Topic: Physical Therapy (Done)     Point: Mobility training (Done)     Learning Progress Summary           Patient Acceptance, E,TB,D, VU,NR by LIBRA at 10/13/2020 1611    Acceptance, E,TB,D, VU by LIBRA at 10/12/2020 1214    Acceptance, E, VU,NR by SI at 10/11/2020 1426    Comment: pt advised not to get RWX too far out in front.  Need to discuss with MD about brace if choose not to wear because no pain    Acceptance, E,TB,D, VU,NR by LIBRA at 10/8/2020 1723    Acceptance, E,TB, VU,NR by EE at 10/7/2020 1309    Acceptance, E, VU by PC at 10/7/2020 0435    Acceptance, E, VU by AL at 10/4/2020 1234    Acceptance, E,TB, VU,DU by LB at 10/3/2020 1243    Eager, E,TB,D, VU,NR by LIBRA at 10/2/2020 1717    Eager, TB, DU,NR by DJ at 10/1/2020 1558    Comment: review TLSO   Family Acceptance, E,TB,D, VU,NR by LIBRA at 10/8/2020 1723                   Point: Home exercise program (Done)     Learning Progress Summary           Patient Acceptance, E,TB,D, VU,NR by LIBRA at 10/13/2020 1611    Acceptance, E,TB,D, VU by LIBRA at 10/12/2020 1214    Acceptance, E, VU,NR by SI at 10/11/2020 1426    Comment: pt advised not to get RWX too far out in front.  Need to discuss with MD about brace if choose not to wear because no pain    Acceptance, E,TB,D, VU,NR by LIBRA at 10/8/2020 1723    Acceptance, E,TB, VU,NR by HEATHER at 10/7/2020 1309    Acceptance, E, VU by PC at 10/7/2020 0435    Acceptance,  E, VU by AL at 10/4/2020 1234    Acceptance, E,TB, VU,DU by LB at 10/3/2020 1243    Eager, E,TB,D, VU,NR by LIBRA at 10/2/2020 1717    Eager, TB, DU,NR by DJ at 10/1/2020 1558    Comment: review TLSO   Family Acceptance, E,TB,D, VU,NR by LIBRA at 10/8/2020 1723                   Point: Body mechanics (Done)     Learning Progress Summary           Patient Acceptance, E,TB,D, VU,NR by LIBRA at 10/13/2020 1611    Acceptance, E,TB,D, VU by LIBRA at 10/12/2020 1214    Acceptance, E, VU,NR by SI at 10/11/2020 1426    Comment: pt advised not to get RWX too far out in front.  Need to discuss with MD about brace if choose not to wear because no pain    Acceptance, E,TB,D, VU,NR by LIBRA at 10/8/2020 1723    Acceptance, E,TB, VU,NR by EE at 10/7/2020 1309    Acceptance, E, VU by PC at 10/7/2020 0435    Acceptance, E, VU by AL at 10/4/2020 1234    Acceptance, E,TB, VU,DU by LB at 10/3/2020 1243    Eager, E,TB,D, VU,NR by LIBRA at 10/2/2020 1717    Eager, TB, DU,NR by DJ at 10/1/2020 1558    Comment: review TLSO   Family Acceptance, E,TB,D, VU,NR by LIBRA at 10/8/2020 1723                   Point: Precautions (Done)     Learning Progress Summary           Patient Acceptance, E,TB,D, VU,NR by LIBRA at 10/13/2020 1611    Acceptance, E,TB,D, VU by LIBRA at 10/12/2020 1214    Acceptance, E, VU,NR by SI at 10/11/2020 1426    Comment: pt advised not to get RWX too far out in front.  Need to discuss with MD about brace if choose not to wear because no pain    Acceptance, E,TB,D, VU,NR by LIBRA at 10/8/2020 1723    Acceptance, E,TB, VU,NR by EE at 10/7/2020 1309    Acceptance, E, VU by PC at 10/7/2020 0435    Acceptance, E, VU by AL at 10/4/2020 1234    Acceptance, E,TB, VU,DU by JANY at 10/3/2020 1243    EILEEN Tidwell TB,BATSHEVA, DONALD,NR by LIBRA at 10/2/2020 1717    LACIE Tidwell, STEFANIE,NR by MARIBELL at 10/1/2020 1558    Comment: review TLSO   Family Acceptance, EILEEN,TB,D, VU,NR by LIBRA at 10/8/2020 1723                               User Key     Initials Effective Dates Name Provider Type  Discipline    SI 05/18/15 -  Charity Pike PTA Physical Therapy Assistant PT    PC 06/16/16 -  Lisy Hartman, RN Registered Nurse Nurse    EE 04/03/18 -  Patricia Weaver, PT Physical Therapist PT    AL 04/03/18 -  Sonia Galan, PT Physical Therapist PT    LIBRA 03/07/18 -  Jaylin Saunders PTA Physical Therapy Assistant PT    LB 08/09/20 -  Rosalinda Lawrence, PT Physical Therapist PT    DJ 10/25/19 -  Edie Lowry, PT Physical Therapist PT              PT Recommendation and Plan     Plan of Care Reviewed With: patient  Progress: improving  Outcome Summary: pt enjoys amb, but not feeling as well today; required one rest break during amb today due to SOA; plans SNU soon; CCP assisting w/transportation issues and O2 to facility     Time Calculation:   PT Charges     Row Name 10/13/20 1612             Time Calculation    Start Time  1110  -      Stop Time  1133  -      Time Calculation (min)  23 min  -        User Key  (r) = Recorded By, (t) = Taken By, (c) = Cosigned By    Initials Name Provider Type     Jaylin Saunders PTA Physical Therapy Assistant        Therapy Charges for Today     Code Description Service Date Service Provider Modifiers Qty    50074947823 HC PT THER PROC EA 15 MIN 10/12/2020 Jaylin Saunders PTA GP 2    37149099767 HC PT THER PROC EA 15 MIN 10/12/2020 Jaylin Saunders PTA GP 1    60952158837 HC PT THER PROC EA 15 MIN 10/13/2020 Jaylin Saunders PTA GP 2          PT G-Codes  Outcome Measure Options: AM-PAC 6 Clicks Basic Mobility (PT)  AM-PAC 6 Clicks Score (PT): 16    Jaylin Saunders PTA  10/13/2020

## 2020-10-13 NOTE — PROGRESS NOTES
Caitlyn Longoria (Key: [id number]) - [id number]  Dick 300MG/5ML nebulizer solution  Status: PA Request    Created: October 13th, 2020    Sent: October 13th, 2020    Will update w/ determination regardless, per SW note it does appear that pt is going to SNF though so outpt rx coverage may not be relevant

## 2020-10-13 NOTE — THERAPY TREATMENT NOTE
Acute Care - Physical Therapy Treatment Note  [facility]     Patient Name: Caitlyn Longoria  : 1934  MRN: [id number]  Today's Date: 10/13/2020           PT Assessment (last 12 hours)      PT Evaluation and Treatment     Row Name             Wound 20 005 Right lower leg Abrasion    Wound - Properties Group Placement Date: 20  -VC Placement Time: 55  -VC Present on Hospital Admission: Y  -VC Side: Right  -VC Orientation: lower  -VC Location: leg  -VC Primary Wound Type: Abrasion  -VC    Retired Wound - Properties Group Date first assessed: 20  -VC Time first assessed: 55  -VC Present on Hospital Admission: Y  -VC Side: Right  -VC Location: leg  -VC Primary Wound Type: Abrasion  -VC      User Key  (r) = Recorded By, (t) = Taken By, (c) = Cosigned By    Initials Name Provider Type    Shu Blandon RN Registered Nurse        Physical Therapy Education                 Title: PT OT SLP Therapies (Done)     Topic: Physical Therapy (Done)     Point: Mobility training (Done)     Learning Progress Summary           Patient Acceptance, E,TB,D, VU by LIBRA at 10/12/2020 1214    Acceptance, E, VU,NR by SI at 10/11/2020 1426    Comment: pt advised not to get RWX too far out in front.  Need to discuss with MD about brace if choose not to wear because no pain    Acceptance, E,TB,D, VU,NR by LIBRA at 10/8/2020 1723    Acceptance, E,TB, VU,NR by EE at 10/7/2020 1309    Acceptance, E, VU by PC at 10/7/2020 0435    Acceptance, E, VU by AL at 10/4/2020 1234    Acceptance, E,TB, VU,DU by LB at 10/3/2020 1243    Eager, E,TB,D, VU,NR by LIBRA at 10/2/2020 1717    Eager, TB, DU,NR by DJ at 10/1/2020 1558    Comment: review TLSO   Family Acceptance, E,TB,D, VU,NR by LIBRA at 10/8/2020 1723                   Point: Home exercise program (Done)     Learning Progress Summary           Patient Acceptance, E,TB,D, VU by LIBRA at 10/12/2020 1214    Acceptance, E, VU,NR by SI at 10/11/2020 1426    Comment: pt advised not to  get RWX too far out in front.  Need to discuss with MD about brace if choose not to wear because no pain    Acceptance, E,TB,D, VU,NR by LIBRA at 10/8/2020 1723    Acceptance, E,TB, VU,NR by EE at 10/7/2020 1309    Acceptance, E, VU by PC at 10/7/2020 0435    Acceptance, E, VU by AL at 10/4/2020 1234    Acceptance, E,TB, VU,DU by LB at 10/3/2020 1243    Eager, E,TB,D, VU,NR by LIBRA at 10/2/2020 1717    Eager, TB, DU,NR by DJ at 10/1/2020 1558    Comment: review TLSO   Family Acceptance, E,TB,D, VU,NR by LIBRA at 10/8/2020 1723                   Point: Body mechanics (Done)     Learning Progress Summary           Patient Acceptance, E,TB,D, VU by LIBRA at 10/12/2020 1214    Acceptance, E, VU,NR by SI at 10/11/2020 1426    Comment: pt advised not to get RWX too far out in front.  Need to discuss with MD about brace if choose not to wear because no pain    Acceptance, E,TB,D, VU,NR by LIBRA at 10/8/2020 1723    Acceptance, E,TB, VU,NR by HEATHER at 10/7/2020 1309    Acceptance, E, VU by PC at 10/7/2020 0435    Acceptance, E, VU by AL at 10/4/2020 1234    Acceptance, E,TB, VU,DU by LB at 10/3/2020 1243    Eager, E,TB,D, VU,NR by LIBRA at 10/2/2020 1717    Eager, TB, DU,NR by DJ at 10/1/2020 1558    Comment: review TLSO   Family Acceptance, E,TB,D, VU,NR by LIBRA at 10/8/2020 1723                   Point: Precautions (Done)     Learning Progress Summary           Patient Acceptance, E,TB,D, VU by LIBRA at 10/12/2020 1214    Acceptance, E, VU,NR by SI at 10/11/2020 1426    Comment: pt advised not to get RWX too far out in front.  Need to discuss with MD about brace if choose not to wear because no pain    Acceptance, E,TB,D, VU,NR by LIBRA at 10/8/2020 1723    Acceptance, E,TB, VU,NR by HEATHER at 10/7/2020 1309    Acceptance, E, VU by PC at 10/7/2020 0435    Acceptance, E, VU by AL at 10/4/2020 1234    Acceptance, E,TB, VU,DU by LB at 10/3/2020 1243    Eager, E,TB,D, VU,NR by LIBRA at 10/2/2020 1717    Eager, TB, DU,NR by MARIBELL at 10/1/2020 1558    Comment:  review TLSO   Family Acceptance, E,TB,D, VU,NR by  at 10/8/2020 4783                               User Key     Initials Effective Dates Name Provider Type Discipline    SI 05/18/15 -  Charity Pike, PTA Physical Therapy Assistant PT    PC 06/16/16 -  Lisy Hartman, RN Registered Nurse Nurse    EE 04/03/18 -  Patricia Weaver, PT Physical Therapist PT    AL 04/03/18 -  Sonia Galan, PT Physical Therapist PT    JM 03/07/18 -  Jaylin Saunders PTA Physical Therapy Assistant PT    LB 08/09/20 -  Rosalinda Lawrence, PT Physical Therapist PT    DJ 10/25/19 -  Edie Lowry, PT Physical Therapist PT              PT Recommendation and Plan  Anticipated Discharge Disposition (PT): home with home health(feel pt would benefit from skilled unit, but she only agrees to home w/HH)  Plan of Care Reviewed With: patient  Progress: improving  Outcome Summary: pt agreeable to amb, but c/o diff breathing ; plans SNU at DC       Time Calculation:     Therapy Charges for Today     Code Description Service Date Service Provider Modifiers Qty    97377103667 HC PT THER PROC EA 15 MIN 10/12/2020 Jaylin Saunders PTA GP 2    56016694521 HC PT THER PROC EA 15 MIN 10/13/2020 Jaylin Saunders PTA GP 1          PT G-Codes  Outcome Measure Options: AM-PAC 6 Clicks Basic Mobility (PT)  AM-PAC 6 Clicks Score (PT): 17    Jaylin Saunders PTA  10/13/2020

## 2020-10-13 NOTE — PLAN OF CARE
Goal Outcome Evaluation:  Plan of Care Reviewed With: patient  Progress: improving  Outcome Summary: VSS, c/o back pain, PRN pain medicines per MD order, PO antibiotics, will continue to monitor pt.

## 2020-10-13 NOTE — PLAN OF CARE
Problem: Adult Inpatient Plan of Care  Goal: Plan of Care Review  Recent Flowsheet Documentation  Taken 10/13/2020 1607 by Jaylin Saunders PTA  Plan of Care Reviewed With: patient  Outcome Summary:   pt enjoys amb, but not feeling as well today   required one rest break during amb today due to SOA   plans SNU soon   CCP assisting w/transportation issues and O2 to facility  Patient was wearing a face mask during this therapy encounter. Therapist used appropriate personal protective equipment including eye protection, mask, and gloves.  Mask used was standard procedure mask. Appropriate PPE was worn during the entire therapy session. Hand hygiene was completed before and after therapy session. Patient is not in enhanced droplet precautions.

## 2020-10-13 NOTE — PROGRESS NOTES
Continued Stay Note  [facility]     Patient Name: Caitlyn Longoria  MRN: [id number]  Today's Date: 10/13/2020    Admit Date: 9/29/2020    Discharge Plan     Row Name 10/13/20 1140       Plan    Plan  [facility] skilled, bed available today    Patient/Family in Agreement with Plan  yes    Plan Comments  Per Latonya, [facility] has a skilled bed available today. Pt concerned about transport due to new O2 and is also unsure she can find an available family member or friend. Pt states she cannot afford w/c van transport. CCP scheduled [facility] w/c van transport for 4:00 PM today in case of d/c. [facility] can provide w/c and O2 (Trip ID [id number]). RN (Chaya) updated. Jodee Flynn LCSW        Discharge Codes    No documentation.             Radha Flynn LCSW

## 2020-10-14 VITALS
OXYGEN SATURATION: 99 % | HEART RATE: 100 BPM | DIASTOLIC BLOOD PRESSURE: 72 MMHG | HEIGHT: 62 IN | WEIGHT: 211 LBS | SYSTOLIC BLOOD PRESSURE: 113 MMHG | TEMPERATURE: 97.4 F | BODY MASS INDEX: 38.83 KG/M2 | RESPIRATION RATE: 24 BRPM

## 2020-10-14 LAB
DEPRECATED RDW RBC AUTO: 44.2 FL (ref 37–54)
ERYTHROCYTE [DISTWIDTH] IN BLOOD BY AUTOMATED COUNT: 12.9 % (ref 12.3–15.4)
GLUCOSE BLDC GLUCOMTR-MCNC: 119 MG/DL (ref 70–130)
GLUCOSE BLDC GLUCOMTR-MCNC: 172 MG/DL (ref 70–130)
HCT VFR BLD AUTO: 34.8 % (ref 34–46.6)
HGB BLD-MCNC: 11.2 G/DL (ref 12–15.9)
INR PPP: 2.35 (ref 0.9–1.1)
LYMPHOCYTES # BLD MANUAL: 6.8 10*3/MM3 (ref 0.7–3.1)
LYMPHOCYTES NFR BLD MANUAL: 58 % (ref 19.6–45.3)
LYMPHOCYTES NFR BLD MANUAL: 6 % (ref 5–12)
MCH RBC QN AUTO: 30 PG (ref 26.6–33)
MCHC RBC AUTO-ENTMCNC: 32.2 G/DL (ref 31.5–35.7)
MCV RBC AUTO: 93.3 FL (ref 79–97)
MONOCYTES # BLD AUTO: 0.7 10*3/MM3 (ref 0.1–0.9)
NEUTROPHILS # BLD AUTO: 4.22 10*3/MM3 (ref 1.7–7)
NEUTROPHILS NFR BLD MANUAL: 36 % (ref 42.7–76)
PLAT MORPH BLD: NORMAL
PLATELET # BLD AUTO: 172 10*3/MM3 (ref 140–450)
PMV BLD AUTO: 9.8 FL (ref 6–12)
PROTHROMBIN TIME: 25 SECONDS (ref 11.7–14.2)
RBC # BLD AUTO: 3.73 10*6/MM3 (ref 3.77–5.28)
RBC MORPH BLD: NORMAL
WBC # BLD AUTO: 11.72 10*3/MM3 (ref 3.4–10.8)
WBC MORPH BLD: NORMAL

## 2020-10-14 PROCEDURE — 94799 UNLISTED PULMONARY SVC/PX: CPT

## 2020-10-14 PROCEDURE — 85007 BL SMEAR W/DIFF WBC COUNT: CPT | Performed by: INTERNAL MEDICINE

## 2020-10-14 PROCEDURE — 82962 GLUCOSE BLOOD TEST: CPT

## 2020-10-14 PROCEDURE — 63710000001 INSULIN LISPRO (HUMAN) PER 5 UNITS: Performed by: HOSPITALIST

## 2020-10-14 PROCEDURE — 63710000001 TOBRAMYCIN PER 300 MG: Performed by: INTERNAL MEDICINE

## 2020-10-14 PROCEDURE — 85025 COMPLETE CBC W/AUTO DIFF WBC: CPT | Performed by: INTERNAL MEDICINE

## 2020-10-14 PROCEDURE — 97110 THERAPEUTIC EXERCISES: CPT

## 2020-10-14 PROCEDURE — 85610 PROTHROMBIN TIME: CPT | Performed by: HOSPITALIST

## 2020-10-14 RX ORDER — WARFARIN SODIUM 3 MG/1
3 TABLET ORAL NIGHTLY
Start: 2020-10-14 | End: 2020-10-28 | Stop reason: ALTCHOICE

## 2020-10-14 RX ORDER — ACETAMINOPHEN 325 MG/1
650 TABLET ORAL EVERY 4 HOURS PRN
Start: 2020-10-14

## 2020-10-14 RX ORDER — GUAIFENESIN 600 MG/1
600 TABLET, EXTENDED RELEASE ORAL EVERY 12 HOURS SCHEDULED
Start: 2020-10-14

## 2020-10-14 RX ORDER — HYDROCODONE BITARTRATE AND ACETAMINOPHEN 5; 325 MG/1; MG/1
1 TABLET ORAL EVERY 6 HOURS PRN
Qty: 12 TABLET | Refills: 0 | Status: SHIPPED | OUTPATIENT
Start: 2020-10-14 | End: 2020-10-17

## 2020-10-14 RX ORDER — ACETYLCYSTEINE 200 MG/ML
2 SOLUTION ORAL; RESPIRATORY (INHALATION)
Start: 2020-10-14

## 2020-10-14 RX ORDER — ECHINACEA PURPUREA EXTRACT 125 MG
1 TABLET ORAL AS NEEDED
Refills: 12
Start: 2020-10-14 | End: 2022-01-14 | Stop reason: ALTCHOICE

## 2020-10-14 RX ADMIN — FLUTICASONE PROPIONATE 1 SPRAY: 50 SPRAY, METERED NASAL at 08:54

## 2020-10-14 RX ADMIN — TOBRAMYCIN 300 MG: 1.2 INJECTION, POWDER, LYOPHILIZED, FOR SOLUTION INTRAVENOUS at 11:02

## 2020-10-14 RX ADMIN — CEPHALEXIN 500 MG: 500 CAPSULE ORAL at 15:23

## 2020-10-14 RX ADMIN — IPRATROPIUM BROMIDE AND ALBUTEROL SULFATE 3 ML: 2.5; .5 SOLUTION RESPIRATORY (INHALATION) at 10:50

## 2020-10-14 RX ADMIN — GUAIFENESIN 600 MG: 600 TABLET, EXTENDED RELEASE ORAL at 08:52

## 2020-10-14 RX ADMIN — INSULIN LISPRO 3 UNITS: 100 INJECTION, SOLUTION INTRAVENOUS; SUBCUTANEOUS at 08:53

## 2020-10-14 RX ADMIN — BUDESONIDE AND FORMOTEROL FUMARATE DIHYDRATE 2 PUFF: 160; 4.5 AEROSOL RESPIRATORY (INHALATION) at 11:37

## 2020-10-14 RX ADMIN — ASPIRIN 81 MG: 81 TABLET, COATED ORAL at 08:52

## 2020-10-14 RX ADMIN — ACETYLCYSTEINE 2 ML: 200 SOLUTION ORAL; RESPIRATORY (INHALATION) at 10:51

## 2020-10-14 RX ADMIN — CARVEDILOL 3.12 MG: 3.12 TABLET, FILM COATED ORAL at 08:52

## 2020-10-14 RX ADMIN — LOSARTAN POTASSIUM 25 MG: 25 TABLET, FILM COATED ORAL at 08:52

## 2020-10-14 RX ADMIN — PANTOPRAZOLE SODIUM 40 MG: 40 TABLET, DELAYED RELEASE ORAL at 08:52

## 2020-10-14 RX ADMIN — HYDROCODONE BITARTRATE AND ACETAMINOPHEN 1 TABLET: 5; 325 TABLET ORAL at 11:08

## 2020-10-14 RX ADMIN — CEPHALEXIN 500 MG: 500 CAPSULE ORAL at 08:53

## 2020-10-14 RX ADMIN — GLIPIZIDE 2.5 MG: 5 TABLET ORAL at 07:00

## 2020-10-14 RX ADMIN — FUROSEMIDE 20 MG: 20 TABLET ORAL at 07:00

## 2020-10-14 NOTE — THERAPY TREATMENT NOTE
Patient Name: Caitlyn Longoria  : 1934    MRN: [id number]                              Today's Date: 10/14/2020       Admit Date: 2020    Visit Dx:     ICD-10-CM ICD-9-CM   1. Pneumonia due to infectious organism, unspecified laterality, unspecified part of lung  J18.9 486   2. Dyspnea, unspecified type  R06.00 786.09   3. Leukocytosis, unspecified type  D72.829 288.60   4. Pneumonia due to gram-negative bacteria (CMS/[facility])  J15.6 482.83     Patient Active Problem List   Diagnosis   • Neck and shoulder pain   • Arthropathy of shoulder region   • Carpal tunnel syndrome of left wrist   • Chronic pain of both shoulders   • Chronic left shoulder pain   • Arthropathy of left shoulder   • Dysarthria   • DM II (diabetes mellitus, type II), controlled (CMS/[facility])   • Paroxysmal atrial fibrillation (CMS/[facility])   • HLD (hyperlipidemia)   • Chronic bronchitis (CMS/[facility])   • Coronary artery disease involving native coronary artery of native heart with angina pectoris (CMS/[facility])   • CVA (cerebral vascular accident) (CMS/[facility])   • HTN (hypertension)   • CKD (chronic kidney disease), stage III   • Chronic combined systolic and diastolic congestive heart failure (CMS/[facility])   • Infection of prosthetic right knee joint (CMS/[facility])   • Stasis dermatitis of right lower extremity due to peripheral venous hypertension   • Current use of long term anticoagulation   • Morbid obesity (CMS/[facility])   • Acute respiratory failure with hypoxia (CMS/[facility])   • Rhinovirus   • Asthma with acute exacerbation   • Acute respiratory failure with hypoxemia (CMS/[facility])   • Viral pneumonia   • Hypoxia   • CLL (chronic lymphoid leukemia) in relapse (CMS/[facility])   • Dyspnea   • Anticoagulated on Coumadin   • Closed wedge compression fracture of T5 vertebra (CMS/[facility])   • Pneumonia due to gram-negative bacteria (CMS/[facility])   • Pneumonia due to infectious organism     Past Medical History:   Diagnosis Date   • Aortic calcification (CMS/[facility])     mild, on echo 2017    • Aortic regurgitation     Trace   • Asthma    • Atrial fibrillation (CMS/[facility])    • CAD (coronary artery disease)    • Chronic combined systolic and diastolic congestive heart failure (CMS/[facility])    • CKD (chronic kidney disease) stage 3, GFR 30-59 ml/min    • Coronary artery disease involving native coronary artery of native heart with angina pectoris (CMS/[facility])    • Disc degeneration, lumbar    • DM type 2 (diabetes mellitus, type 2) (CMS/[facility])    • GERD (gastroesophageal reflux disease)    • History of aneurysm     right femoral artery s/p LHC   • History of blood transfusion    • History of fracture    • History of vitamin D deficiency    • Hyperlipidemia    • Hypertension    • Mild mitral regurgitation    • Mitral annular calcification     12/8/2017- echo, moderate   • PAF (paroxysmal atrial fibrillation) (CMS/[facility])    • Peripheral neuropathy    • Skin cancer     Left hand   • Sleep apnea    • SSS (sick sinus syndrome) (CMS/[facility])    • Stroke (cerebrum) (CMS/[facility])    • Tricuspid regurgitation     Trace     Past Surgical History:   Procedure Laterality Date   • BRONCHOSCOPY Bilateral 10/6/2020    Procedure: BRONCHOSCOPY with BILATERAL LUNG washings;  Surgeon: Juno Coburn MD;  Location: [facility] ENDOSCOPY;  Service: Pulmonary;  Laterality: Bilateral;  PRE: purulent bronchitis  POST: PURULENT BRONCHITIS   • BRONCHOSCOPY Bilateral 10/9/2020    Procedure: BRONCHOSCOPY with washing;  Surgeon: Juno Coburn MD;  Location: [facility] ENDOSCOPY;  Service: Pulmonary;  Laterality: Bilateral;  pre/post - mucous plug     • CARDIAC CATHETERIZATION     • CARDIAC ELECTROPHYSIOLOGY PROCEDURE N/A 2/7/2020    Procedure: PPM generator change - dual  medtronic;  Surgeon: Kiel Field MD;  Location: [facility] CATH INVASIVE LOCATION;  Service: Cardiology;  Laterality: N/A;   • CHOLECYSTECTOMY     • CORONARY STENT PLACEMENT     • HERNIA REPAIR      hital hernia   • HYSTERECTOMY     • PACEMAKER IMPLANTATION     • REPLACEMENT TOTAL KNEE  Bilateral      General Information     Row Name 10/14/20 Pending sale to [facility]6          Physical Therapy Time and Intention    Document Type  therapy note (daily note)  -AR     Mode of Treatment  physical therapy  -AR     Row Name 10/14/20 1236          General Information    Patient Profile Reviewed  yes  -AR     Existing Precautions/Restrictions  fall;oxygen therapy device and L/min TLSO for comfort  -AR     Row Name 10/14/20 1236          Living Environment    Lives With  -- ind. living  -AR       User Key  (r) = Recorded By, (t) = Taken By, (c) = Cosigned By    Initials Name Provider Type    AR Leonor Shabazz, PT Physical Therapist        Mobility     Row Name 10/14/20 1237          Bed Mobility    Supine-Sit [place] (Bed Mobility)  not tested  -AR     Sit-Supine [place] (Bed Mobility)  not tested  -AR     Row Name 10/14/20 Pending sale to [facility]7          Sit-Stand Transfer    Sit-Stand [place] (Transfers)  minimum assist (75% patient effort)  -AR     Assistive Device (Sit-Stand Transfers)  walker, front-wheeled  -AR     Row Name 10/14/20 Pending sale to [facility]7          Gait/Stairs (Locomotion)    [place] Level (Gait)  contact guard;verbal cues  -AR     Assistive Device (Gait)  walker, front-wheeled  -AR     Distance in Feet (Gait)  75' x2 with 3 standing rest breaks due to SOB, cues for posutre and keeping RW closer.  -AR     Deviations/Abnormal Patterns (Gait)  festinating/shuffling;jeremi decreased  -AR     Bilateral Gait Deviations  heel strike decreased;forward flexed posture  -AR       User Key  (r) = Recorded By, (t) = Taken By, (c) = Cosigned By    Initials Name Provider Type    AR Leonor Shabazz, PT Physical Therapist        Obj/Interventions    No documentation.       Goals/Plan    No documentation.       Clinical Impression     Row Name 10/14/20 1238          Pain    Additional Documentation  Pain Scale: Numbers Pre/Post-Treatment (Group)  -AR     [facility] Name 10/14/20 1238          Pain Scale: Numbers Pre/Post-Treatment     Pretreatment Pain Rating  0/10 - no pain  -AR     Posttreatment Pain Rating  0/10 - no pain  -AR     Pain Intervention(s)  Repositioned  -AR     Row Name 10/14/20 1238          Plan of Care Review    Plan of Care Reviewed With  patient  -AR     Progress  improving  -AR     Outcome Summary  Improving activity tolerance and ind. w/ mobility during PT.  Able to ambulate w/ contact assist using RW, on 3L NC.  Required standing rest breaks due to SOB/fatigue.  Planning for dC to SNU.  -AR     Row Name 10/14/20 1238          Therapy Assessment/Plan (PT)    Rehab Potential (PT)  good, to achieve stated therapy goals  -AR     Criteria for Skilled Interventions Met (PT)  yes  -AR     Row Name 10/14/20 1238          Vital Signs    O2 Delivery Pre Treatment  supplemental O2  -AR     O2 Delivery Intra Treatment  supplemental O2  -AR     O2 Delivery Post Treatment  supplemental O2  -AR     Row Name 10/14/20 1238          Positioning and Restraints    Pre-Treatment Position  sitting in chair/recliner alarm not plugged in on arrival  -AR     Post Treatment Position  chair  -AR     In Chair  sitting;call light within reach;encouraged to call for assist;reclined  -AR       User Key  (r) = Recorded By, (t) = Taken By, (c) = Cosigned By    Initials Name Provider Type    AR Leonor Shabazz, PT Physical Therapist        Outcome Measures     Row Name 10/14/20 1243          How much help from another person do you currently need...    Turning from your back to your side while in flat bed without using bedrails?  3  -AR     Moving from lying on back to sitting on the side of a flat bed without bedrails?  3  -AR     Moving to and from a bed to a chair (including a wheelchair)?  3  -AR     Standing up from a chair using your arms (e.g., wheelchair, bedside chair)?  3  -AR     Climbing 3-5 steps with a railing?  1  -AR     To walk in hospital room?  3  -AR     AM-PAC 6 Clicks Score (PT)  16  -AR     Row Name 10/14/20 1249          Functional  Assessment    Outcome Measure Options  AM-PAC 6 Clicks Basic Mobility (PT)  -AR       User Key  (r) = Recorded By, (t) = Taken By, (c) = Cosigned By    Initials Name Provider Type    Leonor Markham PT Physical Therapist        Physical Therapy Education                 Title: PT OT SLP Therapies (In Progress)     Topic: Physical Therapy (In Progress)     Point: Mobility training (In Progress)     Learning Progress Summary           Patient Acceptance, E, NR by AR at 10/14/2020 1240    Acceptance, E,TB,D, VU,NR by LIBRA at 10/13/2020 1611    Acceptance, E,TB,D, VU by LIBRA at 10/12/2020 1214    Acceptance, E, VU,NR by SI at 10/11/2020 1426    Comment: pt advised not to get RWX too far out in front.  Need to discuss with MD about brace if choose not to wear because no pain    Acceptance, E,TB,D, VU,NR by LIBRA at 10/8/2020 1723    Acceptance, E,TB, VU,NR by EE at 10/7/2020 1309    Acceptance, E, VU by PC at 10/7/2020 0435    Acceptance, E, VU by AL at 10/4/2020 1234    Acceptance, E,TB, VU,DU by JANY at 10/3/2020 1243    Eager, E,TB,D, VU,NR by LIBRA at 10/2/2020 1717    Eager, TB, DU,NR by MARIBELL at 10/1/2020 1558    Comment: review TLSO   Family Acceptance, E,TB,D, VU,NR by LIBRA at 10/8/2020 1723                   Point: Home exercise program (In Progress)     Learning Progress Summary           Patient Acceptance, E, NR by AR at 10/14/2020 1240    Acceptance, E,TB,D, VU,NR by LIBRA at 10/13/2020 1611    Acceptance, E,TB,D, VU by LIBRA at 10/12/2020 1214    Acceptance, E, VU,NR by SI at 10/11/2020 1426    Comment: pt advised not to get RWX too far out in front.  Need to discuss with MD about brace if choose not to wear because no pain    Acceptance, E,TB,D, VU,NR by LIBRA at 10/8/2020 1723    Acceptance, E,TB, VU,NR by EE at 10/7/2020 1309    Acceptance, E, VU by PC at 10/7/2020 0435    Acceptance, E, VU by AL at 10/4/2020 1234    Acceptance, EILEEN,DONALD MEDELLIN,DU by LB at 10/3/2020 1243    EILEEN Tidwell TB,BATSHEVA, DONALD,NR by LIBRA at 10/2/2020 1717    LACIE Tidwell,  DU,NR by DJ at 10/1/2020 1558    Comment: review TLSO   Family Acceptance, E,TB,D, VU,NR by LIBRA at 10/8/2020 1723                   Point: Body mechanics (In Progress)     Learning Progress Summary           Patient Acceptance, E, NR by AR at 10/14/2020 1240    Acceptance, E,TB,D, VU,NR by LIBRA at 10/13/2020 1611    Acceptance, E,TB,D, VU by LIBRA at 10/12/2020 1214    Acceptance, E, VU,NR by SI at 10/11/2020 1426    Comment: pt advised not to get RWX too far out in front.  Need to discuss with MD about brace if choose not to wear because no pain    Acceptance, E,TB,D, VU,NR by LIBRA at 10/8/2020 1723    Acceptance, E,TB, VU,NR by EE at 10/7/2020 1309    Acceptance, E, VU by PC at 10/7/2020 0435    Acceptance, E, VU by AL at 10/4/2020 1234    Acceptance, E,TB, VU,DU by LB at 10/3/2020 1243    Eager, E,TB,D, VU,NR by LIBRA at 10/2/2020 1717    Eager, TB, DU,NR by DJ at 10/1/2020 1558    Comment: review TLSO   Family Acceptance, E,TB,D, VU,NR by LIBRA at 10/8/2020 1723                   Point: Precautions (In Progress)     Learning Progress Summary           Patient Acceptance, E, NR by AR at 10/14/2020 1240    Acceptance, E,TB,D, VU,NR by LIBRA at 10/13/2020 1611    Acceptance, E,TB,D, VU by LIBRA at 10/12/2020 1214    Acceptance, E, VU,NR by SI at 10/11/2020 1426    Comment: pt advised not to get RWX too far out in front.  Need to discuss with MD about brace if choose not to wear because no pain    Acceptance, E,TB,D, VU,NR by LIBRA at 10/8/2020 1723    Acceptance, E,TB, VU,NR by EE at 10/7/2020 1309    Acceptance, E, VU by PC at 10/7/2020 0435    Acceptance, E, VU by AL at 10/4/2020 1234    Acceptance, E,TB, VU,DU by LB at 10/3/2020 1243    EILEEN Tidwell TB, D, DONALD,NR by LIBRA at 10/2/2020 1717    LACIE Tidwell DU,NR by MARIBELL at 10/1/2020 1558    Comment: review TLSO   Family EILEEN Santizo TB, D, DONALD,NR by LIBRA at 10/8/2020 1723                               User Key     Initials Effective Dates Name Provider Type Discipline    SI 05/18/15 -  Charity Pike  NETTE, PTA Physical Therapy Assistant PT    PC 06/16/16 -  Lisy Hartman, RN Registered Nurse Nurse    EE 04/03/18 -  Patricia Weaver, PT Physical Therapist PT    AL 04/03/18 -  Sonia Galan, PT Physical Therapist PT    JM 03/07/18 -  Jaylin Saunders, WILFREDO Physical Therapy Assistant PT    AR 04/03/18 -  Leonor Shabazz, PT Physical Therapist PT    LB 08/09/20 -  Rosalinda Lawrence, PT Physical Therapist PT    DJ 10/25/19 -  Edie Lowry, PT Physical Therapist PT              PT Recommendation and Plan     Plan of Care Reviewed With: patient  Progress: improving  Outcome Summary: Improving activity tolerance and ind. w/ mobility during PT.  Able to ambulate w/ contact assist using RW, on 3L NC.  Required standing rest breaks due to SOB/fatigue.  Planning for dC to SNU.     Time Calculation:   PT Charges     Row Name 10/14/20 1235             Time Calculation    Start Time  1030  -AR      Stop Time  1046  -AR      Time Calculation (min)  16 min  -AR      PT Received On  10/14/20  -AR      PT - Next Appointment  10/16/20  -AR      PT Goal Re-Cert Due Date  10/21/20  -AR        User Key  (r) = Recorded By, (t) = Taken By, (c) = Cosigned By    Initials Name Provider Type    AR Leonor Shabazz PT Physical Therapist        Therapy Charges for Today     Code Description Service Date Service Provider Modifiers Qty    42349929509 HC PT THER PROC EA 15 MIN 10/14/2020 Leonor Shabazz, PT GP 1          PT G-Codes  Outcome Measure Options: AM-PAC 6 Clicks Basic Mobility (PT)  AM-PAC 6 Clicks Score (PT): 16    Leonor Shabazz PT  10/14/2020

## 2020-10-14 NOTE — PLAN OF CARE
Problem: Adult Inpatient Plan of Care  Goal: Plan of Care Review  Outcome: Ongoing, Progressing  Flowsheets  Taken 10/14/2020 0019 by Shu Macedo RN  Progress: improving  Taken 10/13/2020 1750 by Chaya Bird RN  Plan of Care Reviewed With: patient  Outcome Summary: VSS, c/o back pain, PRN pain medicines per MD order, PO antibiotics, will continue to monitor pt.  Goal: Patient-Specific Goal (Individualized)  Outcome: Ongoing, Progressing  Goal: Absence of Hospital-Acquired Illness or Injury  Outcome: Ongoing, Progressing  Intervention: Identify and Manage Fall Risk  Recent Flowsheet Documentation  Taken 10/14/2020 0000 by Shu Macedo RN  Safety Promotion/Fall Prevention: safety round/check completed  Taken 10/13/2020 2200 by Shu Macedo RN  Safety Promotion/Fall Prevention: safety round/check completed  Taken 10/13/2020 2105 by Shu Macedo RN  Safety Promotion/Fall Prevention: safety round/check completed  Intervention: Prevent Skin Injury  Recent Flowsheet Documentation  Taken 10/14/2020 0000 by Shu Macedo RN  Body Position: position changed independently  Taken 10/13/2020 2105 by Shu Macedo RN  Body Position: legs elevated  Intervention: Prevent Infection  Recent Flowsheet Documentation  Taken 10/14/2020 0000 by Shu Macedo RN  Infection Prevention: rest/sleep promoted  Taken 10/13/2020 2200 by Shu Macedo RN  Infection Prevention: single patient room provided  Taken 10/13/2020 2105 by Shu Macedo RN  Infection Prevention: single patient room provided  Goal: Optimal Comfort and Wellbeing  Outcome: Ongoing, Progressing  Goal: Readiness for Transition of Care  Outcome: Ongoing, Progressing   Goal Outcome Evaluation:  Plan of Care Reviewed With: patient  Progress: improving  Outcome Summary: diagnosis of new onset COPD exacerbation. Steroids and bronchodilators started. Patient up to bedside commode, experiences some SOB. 02 set at 2L nasal cannula. Home meds  restarted.

## 2020-10-14 NOTE — PLAN OF CARE
Goal Outcome Evaluation:  Plan of Care Reviewed With: patient  Progress: improving  Outcome Summary: VSS, pain controlled with PRN pain medicines, d/c to SNF.

## 2020-10-14 NOTE — DISCHARGE SUMMARY
PHYSICIAN DISCHARGE SUMMARY                                                                        [facility]    Patient Identification:  Name: Caitlyn Longoria  Age: 85 y.o.  Sex: female  :  1934  MRN: [id number]  Primary Care Physician: Zenaida Suarez MD    Admit date: 2020  Discharge date and time: 10/14/2020    Discharged Condition: good    Discharge Diagnoses:  Chronic bronchitis: Severe with pseudomonas aeruginosa colonization versus persistent infection.   Atelectasis +/- pneumonia  Acute hypoxic respiratory failure - chronic respiratory failure  Persistent versus recurrent rhinovirus infection:  Chronic left hemidiaphragm paralysis.  Diabetes type 2:  Paroxysmal atrial fibrillation:  Chronic kidney disease stage III:  Morbid obesity:  Chronic anticoagulation, Coumadin:  Closed wedge compression fraction, T5 vertebrae: Conservative management.  CLL:  Pulmonary hypertension:  Systemic hypertension:  Obstructive sleep apnea:  Epistaxis:    Hospital Course:  Pleasant 85-year-old female with multiple medical issues as noted above.  This includes chronic shortness of air with history of COPD, asthma, obstructive sleep apnea, persistent sputum production, recurrent versus persistent rhinovirus infection.  She presents with another bout of increasing shortness of air.  Please H&P for full details.  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  CT scan of the chest showed atelectasis versus infiltrate right lower lobe.  Patient was admitted and her pulmonologist consulted on the case.  She underwent aggressive bronchodilator treatment as well as with mucolytic's.  She is started on IV antibiotics with Pseudomonas coverage and then tobramycin aerosol treatments added to the regime.  She did undergo bronchoscopy which revealed copious  amount of secretions which were washed out.  Within 3 days these appeared to build up again and she underwent repeat bronchoscopy.  Continue with aggressive pulmonary toiletry and aerosol treatments.  She did very slowly began to improve at this point then.  Repeat chest rate does look improved.  Procalcitonin level remains normal.  She is afebrile vital signs stable.  Her O2 requirements remained elevated at 3 L and I suspect this is probably a new baseline then for her.  She is stable however is completed IV antibiotics and has been cleared for discharge to rehab for remainder treatment follow-up.      Consults:     Consults     Date and Time Order Name Status Description    10/1/2020 1349 Inpatient Infectious Diseases Consult Completed     9/30/2020 1612 Inpatient Pulmonology Consult Completed     9/30/2020 1612 Inpatient Neurosurgery Consult Completed     9/30/2020 0047 Inpatient Hematology & Oncology Consult      9/29/2020 2059 [facility] (on-call MD unless specified) Details Completed     9/29/2020 2026 Pulmonology (on-call MD unless specified) Completed     8/29/2020 1705 Hematology & Oncology Inpatient Consult Completed     8/29/2020 1240 Pulmonology (on-call MD unless specified) Completed             Discharge Exam:  Physical Exam   Afebrile vital signs stable.  Well-developed morbidly obese female no apparent distress.  Lungs with very coarse and loose rhonchi throughout.  Mild increase in expiratory phase.  No inspiratory retractions.  Heart irregular but with good rate control.  Extremities no clubbing cyanosis edema.  Alert oriented conversant cooperative pleasant.     Disposition:  Skilled nursing facility    Patient Instructions:      Discharge Medications      New Medications      Instructions Start Date   acetylcysteine 20 % nebulizer solution  Commonly known as: MUCOMYST   2 mL, Nebulization, 4 Times Daily - RT      guaiFENesin 600 MG 12 hr tablet  Commonly known as: MUCINEX   600 mg, Oral, Every 12 Hours  Scheduled      HYDROcodone-acetaminophen 5-325 MG per tablet  Commonly known as: NORCO   1 tablet, Oral, Every 6 Hours PRN      sodium chloride 0.65 % nasal spray   1 spray, Nasal, As Needed      tobramycin  MG/5ML nebulizer solution  Commonly known as: Dick   300 mg, Nebulization, 2 Times Daily - RT         Changes to Medications      Instructions Start Date   acetaminophen 325 MG tablet  Commonly known as: TYLENOL  What changed:   · medication strength  · how much to take  · when to take this  · reasons to take this   650 mg, Oral, Every 4 Hours PRN      warfarin 3 MG tablet  Commonly known as: COUMADIN  What changed:   · medication strength  · how much to take  · how to take this  · when to take this  · additional instructions   3 mg, Oral, Nightly         Continue These Medications      Instructions Start Date   albuterol (2.5 MG/3ML) 0.083% nebulizer solution  Commonly known as: PROVENTIL   2.5 mg, Nebulization, Every 4 Hours      albuterol sulfate  (90 Base) MCG/ACT inhaler  Commonly known as: PROVENTIL HFA;VENTOLIN HFA;PROAIR HFA   2 puffs, Inhalation, Every 4 Hours PRN      aspirin 81 MG tablet   81 mg, Oral, Daily      CALCIUM 500 + D PO   Oral, 2 times daily      carvedilol 3.125 MG tablet  Commonly known as: COREG   TAKE ONE TABLET BY MOUTH TWICE A DAY      cephalexin 500 MG capsule  Commonly known as: KEFLEX   500 mg, Oral, 3 Times Daily, 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 She had RX filled at [facility] on 9/29/20 for #[id number] with refills.       FASENRA SC   Subcutaneous, Gets one shot a month, next shot may 13 then one every 8 weeks       fish oil 1000 MG capsule capsule   1,000 mg, Oral, 2 Times Daily With Meals      fluticasone 50 MCG/ACT nasal spray  Commonly known as: FLONASE   1 spray, Nasal, Daily      furosemide 20 MG tablet  Commonly known as: LASIX   20 mg, Oral, Every Morning      glipizide 5 MG tablet  Commonly known as: GLUCOTROL   2.5 mg, Oral, Every Morning       losartan 25 MG tablet  Commonly known as: COZAAR   25 mg, Oral, Daily      Lubricating Eye Drops 0.4-0.3 % solution ophthalmic solution  Generic drug: polyethyl glycol-propyl glycol   1 drop, Both Eyes, Every 1 Hour PRN      montelukast 10 MG tablet  Commonly known as: SINGULAIR   10 mg, Oral, Nightly      oxazepam 10 MG capsule  Commonly known as: SERAX   10 mg, Oral, Nightly      oxybutynin XL 10 MG 24 hr tablet  Commonly known as: DITROPAN-XL   10 mg, Oral, Every Night at Bedtime      pantoprazole 40 MG EC tablet  Commonly known as: PROTONIX   40 mg, Oral, Daily      rosuvastatin 20 MG tablet  Commonly known as: CRESTOR   20 mg, Oral, Nightly      Trelegy Ellipta 100-62.5-25 MCG/INH aerosol powder   Generic drug: Fluticasone-Umeclidin-Vilant   1 puff, Inhalation, Daily, As directed         Stop These Medications    Acetylcysteine 500 MG capsule     BIOFREEZE ROLL-ON EX     cetirizine 10 MG tablet  Commonly known as: zyrTEC     ketoconazole 2 % cream  Commonly known as: NIZORAL     loperamide 2 MG capsule  Commonly known as: IMODIUM     sodium chloride 7 % nebulizer solution nebulizer solution          Diet Instructions     Diet: Consistent Carbohydrate      Discharge Diet: Consistent Carbohydrate        Future Appointments   Date Time Provider Department Center   10/28/2020 10:40 AM LAB CHAIR 3 [facility]  LAB [facility]   10/28/2020 11:20 AM Lucien Larkin MD [facility] CBC [facility]   10/28/2020 11:45 AM CHAIR 03 [facility] ANA MARIA - LG  INFUS [facility]   1/13/2021  9:30 AM PACEART IN OFFICE, LCG [facility] CD [facility] None   1/13/2021 10:00 AM Darling Rodney, APRN [facility] CD [facility] None   3/1/2021  1:10 PM Mehul Miller MD [facility] ID [facility] None     Additional Instructions for the Follow-ups that You Need to Schedule     Discharge Follow-up with PCP   As directed       Currently Documented PCP:    Zenaida Suarez MD    PCP Phone Number:    [phone]     Follow Up Details: 1 week         Discharge  Follow-up with Specialty: Pulmonology   As directed      Specialty: Pulmonology    Follow Up Details: As directed         Protime-INR    Oct 16, 2020 (Approximate)      Is Patient on anti-coag: Yes            Contact information for follow-up providers     Zenaida Suarez MD .    Specialty: Internal Medicine  Why: 1 week  Contact information:  [address]  [address]  [facility] [address]  [phone]                   Contact information for after-discharge care     Destination     [facility] .    Service: Skilled Nursing  Contact information:  [address]  [facility] [id number] [phone]                           Discharge Order (From admission, onward)     Start     Ordered    10/14/20 1437  Discharge patient  Once     Expected Discharge Date: 10/14/20    Discharge Disposition: Skilled Nursing Facility (DC - External)    Physician of Record for Attribution - Please select from Treatment Team: RD ROSALES [[id number]]    Review needed by CMO to determine Physician of Record: No       Question Answer Comment   Physician of Record for Attribution - Please select from Treatment Team RD ROSALES    Review needed by CMO to determine Physician of Record No        10/14/20 1450                  Total time spent discharging patient including evaluation,post hospitalization follow up,  medication and post hospitalization instructions and education total time exceeds 30 minutes.    Signed:  Rd Rosales MD  10/14/2020  14:50 EDT    EMR Dragon/Transcription disclaimer:   Much of this encounter note is an electronic transcription/translation of spoken language to printed text. The electronic translation of spoken language may permit erroneous, or at times, nonsensical words or phrases to be inadvertently transcribed; Although I have reviewed the note for such errors, some may still exist.

## 2020-10-14 NOTE — PROGRESS NOTES
Case Management Discharge Note      Final Note: [facility] skilled via [facility] w/c van 4:00 PM. CCP confirmed [facility] can accept pt today ([facility] 133). CCP confirmed [facility] w/c van with O2 set for 4:00 PM. D/w RN (Chaya). Packet on pt chart. Jodee Flynn LCSW    Provided Post Acute Provider List?: Refused  Refused Provider List Comment: [facility] which she has used in the past    Selected Continued Care - Admitted Since 9/29/2020     Destination Coordination complete    Service Provider Selected Services Address Phone Fax    [facility]  Skilled Nursing [address] [address] [phone]          Durable Medical Equipment    No services have been selected for the patient.              Dialysis/Infusion    No services have been selected for the patient.              Home Medical Care    No services have been selected for the patient.              Therapy    No services have been selected for the patient.              Community Resources    No services have been selected for the patient.                  Transportation Services  W/C Van: [facility]    Final Discharge Disposition Code: 03 - skilled nursing facility (SNF)

## 2020-10-14 NOTE — PROGRESS NOTES
"  Daily Progress Note.   [address]  10/14/2020    Patient:  Name:  Caitlyn Longoria  MRN:  [id number]  1934  85 y.o.  female         CC: felt badly    Interval History:  Follow up for pna, mucus plugging.  She looks better today she has still some cough some sputum production however she is able to get some stuff up she says.  No hemoptysis now for 2 to 3 days.  She looks a little better today she is in a better mood as well.    ROS: No fever, no diarrhea, no chest pain  PMFSSH: no change    Physical Exam:  /72 (BP Location: Left arm, Patient Position: Sitting)   Pulse 100   Temp 97.4 °F (36.3 °C)   Resp 24   Ht 157.5 cm (62.01\")   Wt 95.7 kg (211 lb)   SpO2 99%   BMI 38.58 kg/m²   Body mass index is 38.58 kg/m².    Intake/Output Summary (Last 24 hours) at 10/14/2020 1231  Last data filed at 10/14/2020 0901  Gross per 24 hour   Intake 360 ml   Output 300 ml   Net 60 ml     General appearance: ill but non toxic, conversant   Eyes: anicteric sclerae, moist conjunctivae; no lid-lag; PERRLA  HENT: Atraumatic; oropharynx mmm no lesion  Neck: Trachea midline; FROM, supple, no thyromegaly or lymphadenopathy  Lungs: bilateral air entry, no wheeze  No focal crackles, less rhonchi through lung fields, with normal respiratory effort and no intercostal retractions  CV: RRR, no rub  Abdomen: Soft, non-tender; no masses or HSM +obese  Extremities: No peripheral edema or extremity lymphadenopathy  Skin: Normal temperature, turgor and texture; no rash, ulcers or subcutaneous nodules multiple areas of ecchymosis  Psych: Appropriate affect, alert and oriented to person, place and time    Data Review:  Notable Labs:  Results from last 7 days   Lab Units 10/14/20  0614 10/13/20  0543 10/12/20  0549 10/10/20  0849 10/09/20  0548 10/08/20  0734   WBC 10*3/mm3 11.72* 10.69 13.57* 15.36* 20.49* 16.55*   HEMOGLOBIN g/dL 11.2* 11.3* 11.5* 11.6* 11.7* 11.7*   PLATELETS 10*3/mm3 172 174 170 196 212 189 "     Results from last 7 days   Lab Units 10/13/20  0543 10/12/20  0549 10/10/20  0849 10/09/20  0548 10/08/20  0734   SODIUM mmol/L 138 139 137 138 137   POTASSIUM mmol/L 3.5 3.4* 3.9 4.0 4.2   CHLORIDE mmol/L 101 99 97* 97* 97*   CO2 mmol/L 27.9 29.6* 31.5* 29.7* 28.4   BUN mg/dL 19 22 21 23 21   CREATININE mg/dL 0.90 0.97 0.87 1.03* 0.85   GLUCOSE mg/dL 144* 130* 139* 164* 279*   CALCIUM mg/dL 8.4* 8.4* 8.7 8.9 8.5*   Estimated Creatinine Clearance: 49.3 mL/min (by C-G formula based on SCr of 0.9 mg/dL).    Results from last 7 days   Lab Units 10/14/20  0614 10/13/20  0543 10/12/20  0549   PROCALCITONIN ng/mL  --   --  0.06   PLATELETS 10*3/mm3 172 174 170       Results from last 7 days   Lab Units 10/07/20  1555   PH, ARTERIAL pH units 7.397   PCO2, ARTERIAL mm Hg 46.9*   PO2 ART mm Hg 62.4*   HCO3 ART mmol/L 28.9*       Imaging:  Reviewed chest images personally from past 3 days    Scheduled meds:    acetylcysteine, 2 mL, Nebulization, 4x Daily - RT  aspirin, 81 mg, Oral, Daily  budesonide-formoterol, 2 puff, Inhalation, BID - RT  carvedilol, 3.125 mg, Oral, BID  cephalexin, 500 mg, Oral, TID  fluticasone, 1 spray, Nasal, BID  furosemide, 20 mg, Oral, QAM  glipizide, 2.5 mg, Oral, QAM  guaiFENesin, 600 mg, Oral, Q12H  insulin lispro, 0-14 Units, Subcutaneous, TID AC  ipratropium-albuterol, 3 mL, Nebulization, 4x Daily - RT  losartan, 25 mg, Oral, Daily  montelukast, 10 mg, Oral, Nightly  oxazepam, 10 mg, Oral, Nightly  oxybutynin XL, 10 mg, Oral, Nightly  pantoprazole, 40 mg, Oral, Daily  rosuvastatin, 20 mg, Oral, Nightly  tobramycin, 300 mg, Nebulization, BID - RT  warfarin, 3 mg, Oral, Daily        ASSESSMENT  /  PLAN:  1. COPD/asthma with mild exacerbation  2. Right lower lobe pneumonia with heavy growth of Pseudomonas on cultures  3. Chronic bronchitis  4. Acute hypoxemic respiratory failure  5. Elevated left hemidiaphragm: Chronic  6. Ischemic cardiomyopathy  7. Diastolic dysfunction  8. Mitral  regurgitation  9. Atrial fibrillation and sick sinus syndrome  10. CLL  11. 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 Hypertension  13. Diabetes  14. CHARLENE on BiPAP  15. Immunodeficiency       Think her chance now to get her stronger get her physically up and moving more think she would be okay for discharge to a skilled nursing facility from a pulmonary perspective.  Coordinated care with Dr Yoo today.  Continue current therapy  Suspect she will need nebulize tobramycin intermittently potentially long-term.  We will get this set up for discharge and defer further to Dr Coburn.  Completes cefepime yesterday  Cont chest pt  Encouraged flutter  Cont nebs  cxr reviewed improved.  If looks good would continue strengthening and care at SNF.        Cam Suarez MD  [facility]  10/14/20  1231

## 2020-10-14 NOTE — PLAN OF CARE
Problem: Adult Inpatient Plan of Care  Goal: Plan of Care Review  Recent Flowsheet Documentation  Taken 10/14/2020 1238 by Leonor Shabazz, PT  Progress: improving  Plan of Care Reviewed With: patient  Outcome Summary: Improving activity tolerance and ind. w/ mobility during PT.  Able to ambulate w/ contact assist using RW, on 3L NC.  Required standing rest breaks due to SOB/fatigue.  Planning for dC to SNU.       ..Patient was intermittently wearing a face mask during this therapy encounter. Therapist used appropriate personal protective equipment including eye protection, mask, and gloves.  Mask used was standard procedure mask. Appropriate PPE was worn during the entire therapy session. Hand hygiene was completed before and after therapy session. Patient is not in enhanced droplet precautions.

## 2020-10-25 PROBLEM — M80.88XA OSTEOPOROTIC COMPRESSION FRACTURE OF SPINE: Status: ACTIVE | Noted: 2020-09-30

## 2020-10-26 NOTE — PROGRESS NOTES
Subjective .     1.Follow-up of chronic lymphocytic leukemia with recurrent lung infections    Referring MD:    Amarilis Suarez MD    HHistory of Present Illness patient is an 86-year-old female whom I first saw on 2014 with stage 0 CLL which is never required treatments.    Over the last year however she has had multiple hospitalizations for lung infections predominantly viral probably also bacterial and at her last hospitalization in October we rechecked her gammaglobulins which were low and opted to start IV IgG monthly to see if this would keep her out of the hospital    She is currently just completing rehab at the [facility] and is hoping to go home later this week.  She is still weak and in a wheelchair but can ambulate with assistance.  She is on 3 L of O2 chronically plus numerous pulmonary medications    She has had no fever sweats or weight loss recently    She has been tested weekly for Covid and negative at the [facility]    Past Medical History:   Diagnosis Date   • Aortic calcification (CMS/HCC)     mild, on echo 12/17/2017   • Aortic regurgitation     Trace   • Asthma    • Atrial fibrillation (CMS/HCC)    • CAD (coronary artery disease)    • Chronic combined systolic and diastolic congestive heart failure (CMS/HCC)    • CKD (chronic kidney disease) stage 3, GFR 30-59 ml/min    • Coronary artery disease involving native coronary artery of native heart with angina pectoris (CMS/HCC)    • Disc degeneration, lumbar    • DM type 2 (diabetes mellitus, type 2) (CMS/HCC)    • GERD (gastroesophageal reflux disease)    • History of aneurysm     right femoral artery s/p LHC   • History of blood transfusion    • History of fracture    • History of vitamin D deficiency    • Hyperlipidemia    • Hypertension    • Mild mitral regurgitation    • Mitral annular calcification     12/8/2017- echo, moderate   • PAF (paroxysmal atrial fibrillation) (CMS/HCC)    • Peripheral neuropathy    • Skin cancer     Left hand    • Sleep apnea    • SSS (sick sinus syndrome) (CMS/HCC)    • Stroke (cerebrum) (CMS/HCC)    • Tricuspid regurgitation     Trace       ONCOLOGIC HISTORY:    [facility] HISTORY  Patient is an 85-year-old female whom I had seen in 2014 when she was hospitalized at [facility] and was diagnosed with chronic lymphocytic leukemia.  She has not been to see me in over 4 years and is following with Dr. Suarez her primary care doctor and her white count is gone up a little higher than usual to 22,000 and she is referred back to us for evaluation.  We are doing a telephone visit because of the coronavirus pandemic and her age and susceptibility.    When I originally saw her in 2014 she was brought into the ER unresponsive in septic shock on 03/25/2014 with methicillin-resistant staphylococcus identified in her blood cultures. The patient has been on antibiotic with improvement, but had some residual kidney damage with a creatinine in the 4-range requiring hemodialysis, and was noted on admission to have an elevated white count with lymphocytosis, normal hemoglobin, but a platelet count of 95,000, and over the course of the illness her platelet count normalized and the white count had gone up into the 20,000 range with lymphocytosis. A flow cytometry was sent which is consistent with CLL, she came to our office once or twice and then stopped coming because she was so stable     She tells me now she was hospitalized recently with rhinovirus infection and has had diagnosed with asthma and is having a lot of respiratory issues but does not require oxygen.She is at the [facility] home in independent living and managing fairly well    She denies fever sweats or weight loss.  Her last white count was on 5/26/2020  Showed a white count of 18,000 with 66 lymphs and 27 neutrophils platelet white hemoglobin is 12.6 platelet 188,000    I reassured Christopher that no treatment is indicated for this level of leukocytosis from CLL and if she has no systemic  complaints I do not feel strongly about doing CAT scans at her age but I would like to physically examine her in the office in a couple of months to make sure there is no obvious adenopathy or hepatosplenomegaly.    She does have recurrent infections and we can check quantitative immunoglobulins to see how low they are as another adjunctive option to prevent recurrent infections if she has hypogammaglobulinemia    She remains on Coumadin for atrial fibrillation      Current Outpatient Medications on File Prior to Visit   Medication Sig Dispense Refill   • acetaminophen (TYLENOL) 325 MG tablet Take 2 tablets by mouth Every 4 (Four) Hours As Needed for Mild Pain .     • acetylcysteine (MUCOMYST) 20 % nebulizer solution Take 2 mL by nebulization 4 (Four) Times a Day.     • albuterol (PROVENTIL) (2.5 MG/3ML) 0.083% nebulizer solution Take 2.5 mg by nebulization Every 4 (Four) Hours.     • albuterol sulfate  (90 Base) MCG/ACT inhaler Inhale 2 puffs Every 4 (Four) Hours As Needed for Wheezing. 1 inhaler 3   • aspirin 81 MG tablet Take 81 mg by mouth Daily.     • Benralizumab (FASENRA SC) Inject  under the skin into the appropriate area as directed. Gets one shot a month, next shot may 13 then one every 8 weeks     • Calcium Carbonate-Vitamin D (CALCIUM 500 + D PO) Take  by mouth 2 (two) times a day.     • carvedilol (COREG) 3.125 MG tablet TAKE ONE TABLET BY MOUTH TWICE A  tablet 1   • cephalexin (KEFLEX) 500 MG capsule Take 500 mg by mouth 3 (Three) Times a Day. 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 She had RX filled at [facility] on 9/29/20 for #270 with refills.     • fluconazole (DIFLUCAN) 150 MG tablet      • fluticasone (FLONASE) 50 MCG/ACT nasal spray 1 spray into the nostril(s) as directed by provider Daily.     • Fluticasone-Umeclidin-Vilant (Trelegy Ellipta) 100-62.5-25 MCG/INH aerosol powder  Inhale 1 puff Daily. As directed      • furosemide (LASIX) 20 MG tablet Take 20 mg by mouth Every  Morning.     • glipizide (GLUCOTROL XL) 2.5 MG 24 hr tablet      • guaiFENesin (MUCINEX) 600 MG 12 hr tablet Take 1 tablet by mouth Every 12 (Twelve) Hours.     • losartan (COZAAR) 25 MG tablet Take 1 tablet by mouth Daily. 30 tablet 11   • montelukast (SINGULAIR) 10 MG tablet Take 10 mg by mouth Every Night.     • Omega-3 Fatty Acids (FISH OIL) 1000 MG capsule capsule Take 1,000 mg by mouth 2 (Two) Times a Day With Meals.     • oxazepam (SERAX) 10 MG capsule Take 1 capsule by mouth Every Night. 5 capsule 0   • oxybutynin XL (DITROPAN-XL) 10 MG 24 hr tablet Take 10 mg by mouth every night at bedtime.     • pantoprazole (PROTONIX) 40 MG EC tablet Take 40 mg by mouth Daily.     • polyethyl glycol-propyl glycol (LUBRICATING EYE DROPS) 0.4-0.3 % solution ophthalmic solution Administer 1 drop to both eyes Every 1 (One) Hour As Needed.     • rosuvastatin (CRESTOR) 20 MG tablet Take 20 mg by mouth Every Night.     • sodium chloride 0.65 % nasal spray 1 spray into the nostril(s) as directed by provider As Needed for Congestion.  12   • Tobramycin 300 MG/4ML nebulizer solution      • tobramycin PF (Dick) 300 MG/5ML nebulizer solution Take 5 mL by nebulization 2 (Two) Times a Day for 30 days. Indications: Pneumonia 300 mL 0   • warfarin (COUMADIN) 4 MG tablet      • [DISCONTINUED] glipiZIDE (GLUCOTROL) 5 MG tablet Take 2.5 mg by mouth Every Morning.     • [DISCONTINUED] warfarin (COUMADIN) 3 MG tablet Take 1 tablet by mouth Every Night. Indications: Atrial Fibrillation       No current facility-administered medications on file prior to visit.        ALLERGIES:     Allergies   Allergen Reactions   • Accupril [Quinapril Hcl] Swelling, Other (See Comments), GI Intolerance and Delirium     HA, constipation    • Ahist [Chlorpheniramine] Nausea Only, Other (See Comments) and Dizziness     Headache, Blurred vision   • Clarithromycin Nausea Only, Other (See Comments) and Mental Status Change     HA, Depression, Flushing   •  Esomeprazole GI Intolerance   • Levalbuterol Swelling   • Levocetirizine Diarrhea and GI Intolerance   • Lipitor [Atorvastatin] Other (See Comments) and Myalgia     Dark urine   • Omeprazole Nausea Only and Other (See Comments)     HA   • Pravachol [Pravastatin] Nausea Only and GI Intolerance     Bloated, Constipation, HA   • Sulindac Other (See Comments) and Myalgia     HA, joint pain, bruising   • Valdecoxib Irritability   • Chlorcyclizine Unknown - Low Severity   • Diclofenac Sodium Unknown - Low Severity   • Diphenhydramine Unknown - Low Severity   • Lodine [Etodolac] Unknown - Low Severity   • Sulfa Antibiotics Unknown - Low Severity       Social History     Socioeconomic History   • Marital status: Single     Spouse name: Not on file   • Number of children: Not on file   • Years of education: Not on file   • Highest education level: Not on file   Occupational History     Employer: RETIRED   Tobacco Use   • Smoking status: Never Smoker   • Smokeless tobacco: Never Used   • Tobacco comment: caffeine use- soda   Substance and Sexual Activity   • Alcohol use: Not Currently   • Drug use: No   • Sexual activity: Defer   Lifestyle   • Physical activity     Days per week: 0 days     Minutes per session: 0 min   • Stress: Only a little         Cancer-related family history includes Colon cancer in her sister.     Review of Systems   Constitutional: Negative for appetite change, diaphoresis, fever and unexpected weight change.   Respiratory: Positive for cough, shortness of breath and wheezing.    Gastrointestinal: Negative.    Genitourinary: Negative.    Musculoskeletal: Positive for back pain.   Neurological: Negative.    Psychiatric/Behavioral: Negative.    All other systems reviewed and are negative.  I have reviewed and confirmed the accuracy of the ROS as documented by the MA/LPN/JEANNINE Larkin MD          Objective      Vitals:    10/28/20 1038   BP: 120/83   Pulse: 86   Resp: 19   Temp: 97.3 °F (36.3  °C)   SpO2: 98%     Current Status 10/28/2020   ECOG score 2     Pain Score    10/28/20 1038   PainSc:   5   PainLoc: Back  Comment: Thoracic T5/T12 Pt fractured a vertebrae,Shoulder         Physical Exam   Constitutional: She is oriented to person, place, and time. She appears well-developed. No distress.   HENT:   Head: Normocephalic and atraumatic.   Eyes: Conjunctivae are normal.   Neck: Normal range of motion. No JVD present.   Cardiovascular: Normal rate and regular rhythm.   Pulmonary/Chest: Effort normal. No respiratory distress. She has wheezes.   Abdominal: Soft. Bowel sounds are normal.   Neurological: She is alert and oriented to person, place, and time.   Skin: Skin is warm and dry.   Psychiatric: Her behavior is normal.   Vitals reviewed.   I have reexamined the patient and the results are consistent with the previously documented exam. Lucien Larkin MD       RECENT LABS:    Lab Results   Component Value Date    WBC 9.76 10/28/2020    HGB 10.7 (L) 10/28/2020    HCT 34.1 10/28/2020    MCV 98.6 (H) 10/28/2020     10/28/2020         Thoracic spine radiograph  IMPRESSION:  FINDINGS AND IMPRESSION:  Left-sided pacemaker is incompletely visualized.     Extensive bony demineralization limits evaluation. There is a  compression deformity of the T5 vertebral body, best seen on recent CTA  chest resulting in approximately 20% loss of height which is new since  09/16/2019. Findings are concerning for an acute fracture given patient  history cervical and however findings remain overall age-indeterminate  and correlation with patient history point tenderness in this area is  recommended with follow-up MRI for more exact characterization of  clinically indicated.     Multilevel moderate to severe degenerative changes are present within  the thoracic and vi  sualized upper lumbar spine, best seen on recent CTA.    09/29/2020  CT ANGIOGRAM CHEST  IMPRESSION:  No evidence of pulmonary thromboembolism.  There is a new  infiltrate and atelectasis in the posterior right lower lobe where there  are some secretions opacifying several peripheral bronchioles. No other  acute appearing abnormality is present.            Assessment/Plan    1. Chronic lymphocytic leukemia  -stage 0 since 2014 untreated    2.  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  COPD/asthma /emphysema on home O2-recurrent respiratory infections with RSV and rhinovirus and bacteria    4.  Atrial fibrillation on Coumadin    5.  Hypertension/hypercholesterolemia/type 2 diabetes on treatment    Plan  1.  Monthly IV IgG  2.  CMP in 2 months and see me in 4 months  3.  No treatment indicated for CLL at this time            Lucien Larkin MD    Cc:  No ref. provider found

## 2020-10-28 ENCOUNTER — TELEPHONE (OUTPATIENT)
Dept: ONCOLOGY | Facility: CLINIC | Age: 85
End: 2020-10-28

## 2020-10-28 ENCOUNTER — LAB (OUTPATIENT)
Dept: LAB | Facility: HOSPITAL | Age: 85
End: 2020-10-28

## 2020-10-28 ENCOUNTER — OFFICE VISIT (OUTPATIENT)
Dept: ONCOLOGY | Facility: CLINIC | Age: 85
End: 2020-10-28

## 2020-10-28 ENCOUNTER — INFUSION (OUTPATIENT)
Dept: ONCOLOGY | Facility: HOSPITAL | Age: 85
End: 2020-10-28

## 2020-10-28 VITALS — SYSTOLIC BLOOD PRESSURE: 108 MMHG | DIASTOLIC BLOOD PRESSURE: 74 MMHG | HEART RATE: 83 BPM

## 2020-10-28 VITALS
HEART RATE: 86 BPM | OXYGEN SATURATION: 98 % | TEMPERATURE: 97.3 F | DIASTOLIC BLOOD PRESSURE: 83 MMHG | SYSTOLIC BLOOD PRESSURE: 120 MMHG | RESPIRATION RATE: 19 BRPM | BODY MASS INDEX: 38.58 KG/M2 | HEIGHT: 62 IN

## 2020-10-28 DIAGNOSIS — I77.9 BILATERAL CAROTID ARTERY DISEASE, UNSPECIFIED TYPE (HCC): ICD-10-CM

## 2020-10-28 DIAGNOSIS — I25.119 CORONARY ARTERY DISEASE INVOLVING NATIVE CORONARY ARTERY OF NATIVE HEART WITH ANGINA PECTORIS (HCC): ICD-10-CM

## 2020-10-28 DIAGNOSIS — C91.12 CLL (CHRONIC LYMPHOID LEUKEMIA) IN RELAPSE (HCC): Primary | ICD-10-CM

## 2020-10-28 DIAGNOSIS — E66.01 MORBIDLY OBESE (HCC): ICD-10-CM

## 2020-10-28 DIAGNOSIS — J42 CHRONIC BRONCHITIS, UNSPECIFIED CHRONIC BRONCHITIS TYPE (HCC): ICD-10-CM

## 2020-10-28 DIAGNOSIS — D80.1 HYPOGAMMAGLOBULINEMIA (HCC): ICD-10-CM

## 2020-10-28 DIAGNOSIS — C91.12 CLL (CHRONIC LYMPHOID LEUKEMIA) IN RELAPSE (HCC): ICD-10-CM

## 2020-10-28 DIAGNOSIS — J96.01 ACUTE RESPIRATORY FAILURE WITH HYPOXEMIA (HCC): ICD-10-CM

## 2020-10-28 LAB
BASOPHILS # BLD AUTO: 0 10*3/MM3 (ref 0–0.2)
BASOPHILS NFR BLD AUTO: 0 % (ref 0–1.5)
DEPRECATED RDW RBC AUTO: 54.3 FL (ref 37–54)
EOSINOPHIL # BLD AUTO: 0 10*3/MM3 (ref 0–0.4)
EOSINOPHIL NFR BLD AUTO: 0 % (ref 0.3–6.2)
ERYTHROCYTE [DISTWIDTH] IN BLOOD BY AUTOMATED COUNT: 15.2 % (ref 12.3–15.4)
HCT VFR BLD AUTO: 34.1 % (ref 34–46.6)
HGB BLD-MCNC: 10.7 G/DL (ref 12–15.9)
IGA1 MFR SER: 16 MG/DL (ref 70–400)
IGG1 SER-MCNC: 484 MG/DL (ref 700–1600)
IGM SERPL-MCNC: 24 MG/DL (ref 40–230)
IMM GRANULOCYTES # BLD AUTO: 0.05 10*3/MM3 (ref 0–0.05)
IMM GRANULOCYTES NFR BLD AUTO: 0.5 % (ref 0–0.5)
LYMPHOCYTES # BLD AUTO: 4.1 10*3/MM3 (ref 0.7–3.1)
LYMPHOCYTES NFR BLD AUTO: 42 % (ref 19.6–45.3)
MCH RBC QN AUTO: 30.9 PG (ref 26.6–33)
MCHC RBC AUTO-ENTMCNC: 31.4 G/DL (ref 31.5–35.7)
MCV RBC AUTO: 98.6 FL (ref 79–97)
MONOCYTES # BLD AUTO: 0.31 10*3/MM3 (ref 0.1–0.9)
MONOCYTES NFR BLD AUTO: 3.2 % (ref 5–12)
NEUTROPHILS NFR BLD AUTO: 5.3 10*3/MM3 (ref 1.7–7)
NEUTROPHILS NFR BLD AUTO: 54.3 % (ref 42.7–76)
NRBC BLD AUTO-RTO: 0 /100 WBC (ref 0–0.2)
PLATELET # BLD AUTO: 156 10*3/MM3 (ref 140–450)
PMV BLD AUTO: 9.1 FL (ref 6–12)
RBC # BLD AUTO: 3.46 10*6/MM3 (ref 3.77–5.28)
WBC # BLD AUTO: 9.76 10*3/MM3 (ref 3.4–10.8)

## 2020-10-28 PROCEDURE — 82784 ASSAY IGA/IGD/IGG/IGM EACH: CPT | Performed by: INTERNAL MEDICINE

## 2020-10-28 PROCEDURE — 99214 OFFICE O/P EST MOD 30 MIN: CPT | Performed by: INTERNAL MEDICINE

## 2020-10-28 PROCEDURE — 36415 COLL VENOUS BLD VENIPUNCTURE: CPT

## 2020-10-28 PROCEDURE — 25010000002 IMMUNE GLOBULIN (HUMAN) 30 GM/300ML SOLUTION: Performed by: INTERNAL MEDICINE

## 2020-10-28 PROCEDURE — 96366 THER/PROPH/DIAG IV INF ADDON: CPT

## 2020-10-28 PROCEDURE — 85025 COMPLETE CBC W/AUTO DIFF WBC: CPT

## 2020-10-28 PROCEDURE — 96365 THER/PROPH/DIAG IV INF INIT: CPT

## 2020-10-28 RX ORDER — FAMOTIDINE 10 MG/ML
20 INJECTION, SOLUTION INTRAVENOUS AS NEEDED
Status: CANCELLED | OUTPATIENT
Start: 2020-10-28

## 2020-10-28 RX ORDER — SODIUM CHLORIDE 9 MG/ML
250 INJECTION, SOLUTION INTRAVENOUS ONCE
Status: COMPLETED | OUTPATIENT
Start: 2020-10-28 | End: 2020-10-28

## 2020-10-28 RX ORDER — ACETAMINOPHEN 325 MG/1
650 TABLET ORAL ONCE
Status: CANCELLED | OUTPATIENT
Start: 2020-10-28

## 2020-10-28 RX ORDER — HYDROXYZINE HYDROCHLORIDE 25 MG/1
25 TABLET, FILM COATED ORAL ONCE
Status: COMPLETED | OUTPATIENT
Start: 2020-10-28 | End: 2020-10-28

## 2020-10-28 RX ORDER — ACETAMINOPHEN 325 MG/1
650 TABLET ORAL ONCE
Status: COMPLETED | OUTPATIENT
Start: 2020-10-28 | End: 2020-10-28

## 2020-10-28 RX ORDER — DIPHENHYDRAMINE HCL 25 MG
25 CAPSULE ORAL ONCE
Status: CANCELLED | OUTPATIENT
Start: 2020-10-28

## 2020-10-28 RX ORDER — SODIUM CHLORIDE 9 MG/ML
250 INJECTION, SOLUTION INTRAVENOUS ONCE
Status: CANCELLED | OUTPATIENT
Start: 2020-10-28

## 2020-10-28 RX ORDER — TOBRAMYCIN INHALATION 300 MG/4ML
SOLUTION RESPIRATORY (INHALATION)
COMMUNITY
Start: 2020-10-14 | End: 2023-03-22

## 2020-10-28 RX ORDER — GLIPIZIDE 2.5 MG/1
TABLET, EXTENDED RELEASE ORAL
COMMUNITY
Start: 2020-10-22 | End: 2021-09-14

## 2020-10-28 RX ORDER — FLUCONAZOLE 150 MG/1
TABLET ORAL
COMMUNITY
Start: 2020-09-23 | End: 2021-01-13

## 2020-10-28 RX ORDER — DIPHENHYDRAMINE HYDROCHLORIDE 50 MG/ML
50 INJECTION INTRAMUSCULAR; INTRAVENOUS AS NEEDED
Status: CANCELLED | OUTPATIENT
Start: 2020-10-28

## 2020-10-28 RX ORDER — MEPERIDINE HYDROCHLORIDE 50 MG/ML
25 INJECTION INTRAMUSCULAR; INTRAVENOUS; SUBCUTANEOUS
Status: CANCELLED | OUTPATIENT
Start: 2020-10-28

## 2020-10-28 RX ORDER — DIPHENHYDRAMINE HCL 25 MG
25 CAPSULE ORAL ONCE
Status: DISCONTINUED | OUTPATIENT
Start: 2020-10-28 | End: 2020-10-28

## 2020-10-28 RX ORDER — WARFARIN SODIUM 4 MG/1
TABLET ORAL
COMMUNITY
Start: 2020-10-17 | End: 2021-01-04

## 2020-10-28 RX ADMIN — IMMUNE GLOBULIN INFUSION (HUMAN) 30 G: 100 INJECTION, SOLUTION INTRAVENOUS; SUBCUTANEOUS at 13:23

## 2020-10-28 RX ADMIN — ACETAMINOPHEN 650 MG: 325 TABLET ORAL at 12:53

## 2020-10-28 RX ADMIN — SODIUM CHLORIDE 250 ML: 9 INJECTION, SOLUTION INTRAVENOUS at 12:40

## 2020-10-28 RX ADMIN — HYDROXYZINE HYDROCHLORIDE 25 MG: 25 TABLET, FILM COATED ORAL at 12:53

## 2020-10-28 NOTE — TELEPHONE ENCOUNTER
----- Message from Kristel English RN sent at 10/28/2020  4:12 PM EDT -----  Pt would like to be called with next appt times to ensure it will not conflict with other appts or transportation.      Thanks,  Nakia

## 2020-10-28 NOTE — NURSING NOTE
Patient is tolerating IVIG without difficulty. Verbal order per Dr. Larkin to continue IVIG as ordered.

## 2020-11-03 ENCOUNTER — TELEPHONE (OUTPATIENT)
Dept: PHARMACY | Facility: HOSPITAL | Age: 85
End: 2020-11-03

## 2020-11-03 NOTE — TELEPHONE ENCOUNTER
Patient is currently at [facility] under skilled nursing. Left VM for [facility] requesting confirmation that her warfarin is being managed by provider on site or if we need to participate in her care. Awaiting return call.

## 2020-11-07 LAB
FUNGUS WND CULT: ABNORMAL
FUNGUS WND CULT: ABNORMAL

## 2020-11-09 ENCOUNTER — APPOINTMENT (OUTPATIENT)
Dept: GENERAL RADIOLOGY | Facility: HOSPITAL | Age: 85
End: 2020-11-09

## 2020-11-09 ENCOUNTER — HOSPITAL ENCOUNTER (EMERGENCY)
Facility: HOSPITAL | Age: 85
Discharge: HOME | End: 2020-11-09
Attending: EMERGENCY MEDICINE | Admitting: EMERGENCY MEDICINE

## 2020-11-09 VITALS
HEIGHT: 62 IN | OXYGEN SATURATION: 99 % | BODY MASS INDEX: 38.59 KG/M2 | RESPIRATION RATE: 18 BRPM | HEART RATE: 79 BPM | TEMPERATURE: 98.9 F | SYSTOLIC BLOOD PRESSURE: 105 MMHG | DIASTOLIC BLOOD PRESSURE: 59 MMHG

## 2020-11-09 DIAGNOSIS — J45.21 MILD INTERMITTENT ASTHMA WITH ACUTE EXACERBATION: Primary | ICD-10-CM

## 2020-11-09 LAB
ALBUMIN SERPL-MCNC: 4.3 G/DL (ref 3.5–5.2)
ALBUMIN/GLOB SERPL: 2 G/DL
ALP SERPL-CCNC: 93 U/L (ref 39–117)
ALT SERPL W P-5'-P-CCNC: 16 U/L (ref 1–33)
ANION GAP SERPL CALCULATED.3IONS-SCNC: 12.3 MMOL/L (ref 5–15)
APTT PPP: 50.2 SECONDS (ref 22.7–35.4)
AST SERPL-CCNC: 20 U/L (ref 1–32)
B PARAPERT DNA SPEC QL NAA+PROBE: NOT DETECTED
B PERT DNA SPEC QL NAA+PROBE: NOT DETECTED
BASOPHILS # BLD AUTO: 0.01 10*3/MM3 (ref 0–0.2)
BASOPHILS NFR BLD AUTO: 0.1 % (ref 0–1.5)
BILIRUB SERPL-MCNC: 0.5 MG/DL (ref 0–1.2)
BUN SERPL-MCNC: 16 MG/DL (ref 8–23)
BUN/CREAT SERPL: 18.8 (ref 7–25)
C PNEUM DNA NPH QL NAA+NON-PROBE: NOT DETECTED
CALCIUM SPEC-SCNC: 9.1 MG/DL (ref 8.6–10.5)
CHLORIDE SERPL-SCNC: 103 MMOL/L (ref 98–107)
CO2 SERPL-SCNC: 26.7 MMOL/L (ref 22–29)
CREAT SERPL-MCNC: 0.85 MG/DL (ref 0.57–1)
D-LACTATE SERPL-SCNC: 1.6 MMOL/L (ref 0.5–2)
DEPRECATED RDW RBC AUTO: 48.4 FL (ref 37–54)
EOSINOPHIL # BLD AUTO: 0 10*3/MM3 (ref 0–0.4)
EOSINOPHIL NFR BLD AUTO: 0 % (ref 0.3–6.2)
ERYTHROCYTE [DISTWIDTH] IN BLOOD BY AUTOMATED COUNT: 13.7 % (ref 12.3–15.4)
FLUAV SUBTYP SPEC NAA+PROBE: NOT DETECTED
FLUBV RNA ISLT QL NAA+PROBE: NOT DETECTED
GFR SERPL CREATININE-BSD FRML MDRD: 63 ML/MIN/1.73
GLOBULIN UR ELPH-MCNC: 2.2 GM/DL
GLUCOSE SERPL-MCNC: 141 MG/DL (ref 65–99)
HADV DNA SPEC NAA+PROBE: NOT DETECTED
HCOV 229E RNA SPEC QL NAA+PROBE: NOT DETECTED
HCOV HKU1 RNA SPEC QL NAA+PROBE: NOT DETECTED
HCOV NL63 RNA SPEC QL NAA+PROBE: NOT DETECTED
HCOV OC43 RNA SPEC QL NAA+PROBE: NOT DETECTED
HCT VFR BLD AUTO: 36.2 % (ref 34–46.6)
HGB BLD-MCNC: 11.2 G/DL (ref 12–15.9)
HMPV RNA NPH QL NAA+NON-PROBE: NOT DETECTED
HPIV1 RNA SPEC QL NAA+PROBE: NOT DETECTED
HPIV2 RNA SPEC QL NAA+PROBE: NOT DETECTED
HPIV3 RNA NPH QL NAA+PROBE: NOT DETECTED
HPIV4 P GENE NPH QL NAA+PROBE: NOT DETECTED
IMM GRANULOCYTES # BLD AUTO: 0.03 10*3/MM3 (ref 0–0.05)
IMM GRANULOCYTES NFR BLD AUTO: 0.3 % (ref 0–0.5)
INR PPP: 1.8 (ref 0.9–1.1)
LYMPHOCYTES # BLD AUTO: 2.52 10*3/MM3 (ref 0.7–3.1)
LYMPHOCYTES NFR BLD AUTO: 27.3 % (ref 19.6–45.3)
M PNEUMO IGG SER IA-ACNC: NOT DETECTED
MAGNESIUM SERPL-MCNC: 2 MG/DL (ref 1.6–2.4)
MCH RBC QN AUTO: 29.8 PG (ref 26.6–33)
MCHC RBC AUTO-ENTMCNC: 30.9 G/DL (ref 31.5–35.7)
MCV RBC AUTO: 96.3 FL (ref 79–97)
MONOCYTES # BLD AUTO: 0.15 10*3/MM3 (ref 0.1–0.9)
MONOCYTES NFR BLD AUTO: 1.6 % (ref 5–12)
NEUTROPHILS NFR BLD AUTO: 6.51 10*3/MM3 (ref 1.7–7)
NEUTROPHILS NFR BLD AUTO: 70.7 % (ref 42.7–76)
NRBC BLD AUTO-RTO: 0.1 /100 WBC (ref 0–0.2)
NT-PROBNP SERPL-MCNC: 1015 PG/ML (ref 0–1800)
PLATELET # BLD AUTO: 216 10*3/MM3 (ref 140–450)
PMV BLD AUTO: 10.1 FL (ref 6–12)
POTASSIUM SERPL-SCNC: 3.7 MMOL/L (ref 3.5–5.2)
PROCALCITONIN SERPL-MCNC: 0.06 NG/ML (ref 0–0.25)
PROT SERPL-MCNC: 6.5 G/DL (ref 6–8.5)
PROTHROMBIN TIME: 20.4 SECONDS (ref 11.7–14.2)
QT INTERVAL: 421 MS
RBC # BLD AUTO: 3.76 10*6/MM3 (ref 3.77–5.28)
RHINOVIRUS RNA SPEC NAA+PROBE: NOT DETECTED
RSV RNA NPH QL NAA+NON-PROBE: NOT DETECTED
SARS-COV-2 RNA NPH QL NAA+NON-PROBE: NOT DETECTED
SODIUM SERPL-SCNC: 142 MMOL/L (ref 136–145)
TROPONIN T SERPL-MCNC: <0.01 NG/ML (ref 0–0.03)
WBC # BLD AUTO: 9.22 10*3/MM3 (ref 3.4–10.8)

## 2020-11-09 PROCEDURE — 83880 ASSAY OF NATRIURETIC PEPTIDE: CPT | Performed by: EMERGENCY MEDICINE

## 2020-11-09 PROCEDURE — 80053 COMPREHEN METABOLIC PANEL: CPT | Performed by: EMERGENCY MEDICINE

## 2020-11-09 PROCEDURE — 87040 BLOOD CULTURE FOR BACTERIA: CPT | Performed by: EMERGENCY MEDICINE

## 2020-11-09 PROCEDURE — 83735 ASSAY OF MAGNESIUM: CPT | Performed by: EMERGENCY MEDICINE

## 2020-11-09 PROCEDURE — 0202U NFCT DS 22 TRGT SARS-COV-2: CPT | Performed by: EMERGENCY MEDICINE

## 2020-11-09 PROCEDURE — 85610 PROTHROMBIN TIME: CPT | Performed by: EMERGENCY MEDICINE

## 2020-11-09 PROCEDURE — 94640 AIRWAY INHALATION TREATMENT: CPT

## 2020-11-09 PROCEDURE — 71045 X-RAY EXAM CHEST 1 VIEW: CPT

## 2020-11-09 PROCEDURE — 94799 UNLISTED PULMONARY SVC/PX: CPT

## 2020-11-09 PROCEDURE — 99284 EMERGENCY DEPT VISIT MOD MDM: CPT

## 2020-11-09 PROCEDURE — 85025 COMPLETE CBC W/AUTO DIFF WBC: CPT | Performed by: EMERGENCY MEDICINE

## 2020-11-09 PROCEDURE — 84145 PROCALCITONIN (PCT): CPT | Performed by: EMERGENCY MEDICINE

## 2020-11-09 PROCEDURE — 93010 ELECTROCARDIOGRAM REPORT: CPT | Performed by: INTERNAL MEDICINE

## 2020-11-09 PROCEDURE — 84484 ASSAY OF TROPONIN QUANT: CPT | Performed by: EMERGENCY MEDICINE

## 2020-11-09 PROCEDURE — 93005 ELECTROCARDIOGRAM TRACING: CPT | Performed by: EMERGENCY MEDICINE

## 2020-11-09 PROCEDURE — 83605 ASSAY OF LACTIC ACID: CPT | Performed by: EMERGENCY MEDICINE

## 2020-11-09 PROCEDURE — 85730 THROMBOPLASTIN TIME PARTIAL: CPT | Performed by: EMERGENCY MEDICINE

## 2020-11-09 RX ORDER — IPRATROPIUM BROMIDE AND ALBUTEROL SULFATE 2.5; .5 MG/3ML; MG/3ML
3 SOLUTION RESPIRATORY (INHALATION) ONCE
Status: COMPLETED | OUTPATIENT
Start: 2020-11-09 | End: 2020-11-09

## 2020-11-09 RX ORDER — ALBUTEROL SULFATE 2.5 MG/3ML
2.5 SOLUTION RESPIRATORY (INHALATION)
Status: COMPLETED | OUTPATIENT
Start: 2020-11-09 | End: 2020-11-09

## 2020-11-09 RX ADMIN — ALBUTEROL SULFATE 2.5 MG: 2.5 SOLUTION RESPIRATORY (INHALATION) at 14:51

## 2020-11-09 RX ADMIN — ALBUTEROL SULFATE 2.5 MG: 2.5 SOLUTION RESPIRATORY (INHALATION) at 14:41

## 2020-11-09 RX ADMIN — IPRATROPIUM BROMIDE AND ALBUTEROL SULFATE 3 ML: 2.5; .5 SOLUTION RESPIRATORY (INHALATION) at 14:52

## 2020-11-09 NOTE — ED NOTES
Pt reports R chest and shoulder discomfort.  No distress noted.  MD notified.       Jaylin Perkins, RN  11/09/20 [id number]

## 2020-11-09 NOTE — ED PROVIDER NOTES
EMERGENCY DEPARTMENT ENCOUNTER  Patient was placed in face mask in first look and the following protective measures were taken unless additional measures were taken and documented below in the ED course. Patient was wearing facemask when I entered the room and throughout our encounter. I wore full protective equipment throughout this patient encounter including a face mask, and gloves. Hand hygiene was performed before donning protective equipment and after removal when leaving the room.    Room Number:  33/33  Date of encounter:  11/9/2020  PCP: Zenaida Suarez MD    HPI:  Context: Caitlyn Longoria is a 86 y.o. female who presents to the ED c/o chief complaint of shortness of breath.  Patient was recently admitted to this hospital with pneumonia.  Patient was discharged approximately 3 weeks ago, went to rehab, discharged from rehab on Thursday.  Patient reports that she has been on 3 L oxygen since she was discharged from the hospital.  Patient reports that she had improvement of her symptoms but began to feel short of breath on Sunday.  Patient complains of dyspnea on exertion, no dyspnea at rest.  Patient has had some wheezing, was responsive to albuterol, has not responded to albuterol today.  Patient does have a nonproductive cough.  No chest pain.  Patient denies any orthopnea, no paroxysmal nocturnal dyspnea, chronic swelling of her lower extremities, unchanged.  No vomiting or diarrhea.  Patient does endorse loss of sense of taste.  Little p.o. intake over the last several days.  No fever or systemic symptoms.  Patient is followed by pulmonology, Dr. Olivier.    MEDICAL HISTORY REVIEW  Reviewed in EPIC    PAST MEDICAL HISTORY  Active Ambulatory Problems     Diagnosis Date Noted   • Neck and shoulder pain 03/24/2017   • Arthropathy of shoulder region 03/24/2017   • Carpal tunnel syndrome of left wrist 03/24/2017   • Chronic pain of both shoulders 06/27/2017   • Chronic left shoulder pain  12/05/2017   • Arthropathy of left shoulder 12/05/2017   • Dysarthria 12/07/2017   • DM II (diabetes mellitus, type II), controlled (CMS/HCC) 12/07/2017   • Paroxysmal atrial fibrillation (CMS/HCC) 12/07/2017   • HLD (hyperlipidemia) 12/07/2017   • Chronic bronchitis (CMS/HCC) 12/07/2017   • Coronary artery disease involving native coronary artery of native heart with angina pectoris (CMS/HCC) 12/07/2017   • CVA (cerebral vascular accident) (CMS/HCC) 12/10/2017   • HTN (hypertension) 01/14/2018   • CKD (chronic kidney disease), stage III 01/14/2018   • Chronic combined systolic and diastolic congestive heart failure (CMS/HCC) 01/15/2018   • Infection of prosthetic right knee joint (CMS/HCC) 01/17/2018   • Stasis dermatitis of right lower extremity due to peripheral venous hypertension 04/28/2018   • Current use of long term anticoagulation 04/28/2018   • Morbid obesity (CMS/HCC) 02/08/2019   • Acute respiratory failure with hypoxia (CMS/HCC) 09/16/2019   • Rhinovirus 02/11/2020   • Asthma with acute exacerbation 02/11/2020   • Acute respiratory failure with hypoxemia (CMS/HCC) 02/12/2020   • Viral pneumonia 04/23/2020   • Hypoxia 08/29/2020   • CLL (chronic lymphoid leukemia) in relapse (CMS/HCC) 08/31/2020   • Dyspnea 09/29/2020   • Anticoagulated on Coumadin    • Osteoporotic compression fracture of spine (CMS/HCC) 09/30/2020   • Pneumonia due to gram-negative bacteria (CMS/HCC) 10/02/2020   • Pneumonia due to infectious organism 09/29/2020   • Bilateral carotid artery disease (CMS/HCC) 10/28/2020   • Hypogammaglobulinemia (CMS/HCC) 10/28/2020     Resolved Ambulatory Problems     Diagnosis Date Noted   • Hypernatremia 01/15/2018   • Shortness of breath 04/28/2020     Past Medical History:   Diagnosis Date   • Aortic calcification (CMS/[facility])    • Aortic regurgitation    • Asthma    • Atrial fibrillation (CMS/HCC)    • CAD (coronary artery disease)    • CKD (chronic kidney disease) stage 3, GFR 30-59 ml/min    • Disc  degeneration, lumbar    • DM type 2 (diabetes mellitus, type 2) (CMS/[facility])    • GERD (gastroesophageal reflux disease)    • History of aneurysm    • History of blood transfusion    • History of fracture    • History of vitamin D deficiency    • Hyperlipidemia    • Hypertension    • Mild mitral regurgitation    • Mitral annular calcification    • PAF (paroxysmal atrial fibrillation) (CMS/[facility])    • Peripheral neuropathy    • Skin cancer    • Sleep apnea    • SSS (sick sinus syndrome) (CMS/[facility])    • Stroke (cerebrum) (CMS/[facility])    • Tricuspid regurgitation        PAST SURGICAL HISTORY  Past Surgical History:   Procedure Laterality Date   • BRONCHOSCOPY Bilateral 10/6/2020    Procedure: BRONCHOSCOPY with BILATERAL LUNG washings;  Surgeon: Juno Coburn MD;  Location: [facility] ENDOSCOPY;  Service: Pulmonary;  Laterality: Bilateral;  PRE: purulent bronchitis  POST: PURULENT BRONCHITIS   • BRONCHOSCOPY Bilateral 10/9/2020    Procedure: BRONCHOSCOPY with washing;  Surgeon: Juno Coburn MD;  Location: [facility] ENDOSCOPY;  Service: Pulmonary;  Laterality: Bilateral;  pre/post - mucous plug     • CARDIAC CATHETERIZATION     • CARDIAC ELECTROPHYSIOLOGY PROCEDURE N/A 2/7/2020    Procedure: PPM generator change - dual  medtronic;  Surgeon: Kiel Field MD;  Location: [facility] CATH INVASIVE LOCATION;  Service: Cardiology;  Laterality: N/A;   • CHOLECYSTECTOMY     • CORONARY STENT PLACEMENT     • HERNIA REPAIR      hital hernia   • HYSTERECTOMY     • PACEMAKER IMPLANTATION     • REPLACEMENT TOTAL KNEE Bilateral        FAMILY HISTORY  Family History   Problem Relation Age of Onset   • Heart disease Mother    • Hypertension Mother    • Colon polyps Mother    • Heart disease Father    • Hypertension Father    • Stroke Father    • Hypertension Brother    • Heart disease Brother    • Diabetes Niece    • Colon cancer Sister    • Hypertension Sister    • Hypertension Daughter    • Hypertension Son    • Hypertension Maternal Aunt    •  Hypertension Maternal Grandmother    • Hypertension Maternal Grandfather    • Hypertension Paternal Grandmother    • Hypertension Paternal Grandfather        SOCIAL HISTORY  Social History     Socioeconomic History   • Marital status: Single     Spouse name: Not on file   • Number of children: Not on file   • Years of education: Not on file   • Highest education level: Not on file   Occupational History     Employer: RETIRED   Tobacco Use   • Smoking status: Never Smoker   • Smokeless tobacco: Never Used   • Tobacco comment: caffeine use- soda   Substance and Sexual Activity   • Alcohol use: Not Currently   • Drug use: No   • Sexual activity: Defer   Lifestyle   • Physical activity     Days per week: 0 days     Minutes per session: 0 min   • Stress: Only a little       ALLERGIES  Accupril [quinapril hcl], Ahist [chlorpheniramine], Clarithromycin, Esomeprazole, Levalbuterol, Levocetirizine, Lipitor [atorvastatin], Omeprazole, Pravachol [pravastatin], Sulindac, Valdecoxib, Chlorcyclizine, Diclofenac sodium, Diphenhydramine, Lodine [etodolac], and Sulfa antibiotics    The patient's allergies have been reviewed    REVIEW OF SYSTEMS  All systems reviewed and negative except for those discussed in HPI.     PHYSICAL EXAM  I have reviewed the triage vital signs and nursing notes.  ED Triage Vitals [11/09/20 1024]   Temp Heart Rate Resp BP SpO2   98.9 °F (37.2 °C) 84 20 105/59 96 %      Temp src Heart Rate Source Patient Position BP Location FiO2 (%)   Oral -- -- -- --       General: No acute distress, no acute distress, speaking full sentences, maintaining sats greater than 94% on baseline nasal cannula 3 L  HENT: NCAT, PERRL, Nares patent  Eyes: no scleral icterus  Neck: trachea midline, no ROM limitations  CV: regular rhythm, regular rate  Respiratory: normal effort, CTAB, no wheezing, no crackles, no rhonchi  Abdomen: soft, nondistended, nontender to palpation, no rebound tenderness, no guarding or rigidity  :  deferred  Musculoskeletal: no deformity  Neuro: alert, moves all extremities, follows commands  Skin: warm, dry    LAB RESULTS  Recent Results (from the past 24 hour(s))   ECG 12 Lead    Collection Time: 11/09/20 11:48 AM   Result Value Ref Range    QT Interval 421 ms   Respiratory Panel PCR w/COVID-19(SARS-CoV-2) JACEY/EVONNE/LUÍS/PAD/COR/MAD/HELEN In-House, NP Swab in UTM/VTM, 3-4 HR TAT - Swab, Nasopharynx    Collection Time: 11/09/20 12:06 PM    Specimen: Nasopharynx; Swab   Result Value Ref Range    ADENOVIRUS, PCR Not Detected Not Detected    Coronavirus 229E Not Detected Not Detected    Coronavirus HKU1 Not Detected Not Detected    Coronavirus NL63 Not Detected Not Detected    Coronavirus OC43 Not Detected Not Detected    COVID19 Not Detected Not Detected - Ref. Range    Human Metapneumovirus Not Detected Not Detected    Human Rhinovirus/Enterovirus Not Detected Not Detected    Influenza A PCR Not Detected Not Detected    Influenza B PCR Not Detected Not Detected    Parainfluenza Virus 1 Not Detected Not Detected    Parainfluenza Virus 2 Not Detected Not Detected    Parainfluenza Virus 3 Not Detected Not Detected    Parainfluenza Virus 4 Not Detected Not Detected    RSV, PCR Not Detected Not Detected    Bordetella pertussis pcr Not Detected Not Detected    Bordetella parapertussis PCR Not Detected Not Detected    Chlamydophila pneumoniae PCR Not Detected Not Detected    Mycoplasma pneumo by PCR Not Detected Not Detected   Comprehensive Metabolic Panel    Collection Time: 11/09/20  1:09 PM    Specimen: Blood   Result Value Ref Range    Glucose 141 (H) 65 - 99 mg/dL    BUN 16 8 - 23 mg/dL    Creatinine 0.85 0.57 - 1.00 mg/dL    Sodium 142 136 - 145 mmol/L    Potassium 3.7 3.5 - 5.2 mmol/L    Chloride 103 98 - 107 mmol/L    CO2 26.7 22.0 - 29.0 mmol/L    Calcium 9.1 8.6 - 10.5 mg/dL    Total Protein 6.5 6.0 - 8.5 g/dL    Albumin 4.30 3.50 - 5.20 g/dL    ALT (SGPT) 16 1 - 33 U/L    AST (SGOT) 20 1 - 32 U/L    Alkaline  Phosphatase 93 39 - 117 U/L    Total Bilirubin 0.5 0.0 - 1.2 mg/dL    eGFR Non African Amer 63 >60 mL/min/1.73    Globulin 2.2 gm/dL    A/G Ratio 2.0 g/dL    BUN/Creatinine Ratio 18.8 7.0 - 25.0    Anion Gap 12.3 5.0 - 15.0 mmol/L   BNP    Collection Time: 11/09/20  1:09 PM    Specimen: Blood   Result Value Ref Range    proBNP 1,015.0 0.0-1,800.0 pg/mL   Troponin    Collection Time: 11/09/20  1:09 PM    Specimen: Blood   Result Value Ref Range    Troponin T <0.010 0.000 - 0.030 ng/mL   Lactic Acid, Plasma    Collection Time: 11/09/20  1:09 PM    Specimen: Blood   Result Value Ref Range    Lactate 1.6 0.5 - 2.0 mmol/L   Procalcitonin    Collection Time: 11/09/20  1:09 PM    Specimen: Blood   Result Value Ref Range    Procalcitonin 0.06 0.00 - 0.25 ng/mL   Magnesium    Collection Time: 11/09/20  1:09 PM    Specimen: Blood   Result Value Ref Range    Magnesium 2.0 1.6 - 2.4 mg/dL   Protime-INR    Collection Time: 11/09/20  1:09 PM    Specimen: Blood   Result Value Ref Range    Protime 20.4 (H) 11.7 - 14.2 Seconds    INR 1.80 (H) 0.90 - 1.10   aPTT    Collection Time: 11/09/20  1:09 PM    Specimen: Blood   Result Value Ref Range    PTT 50.2 (H) 22.7 - 35.4 seconds   CBC Auto Differential    Collection Time: 11/09/20  1:09 PM    Specimen: Blood   Result Value Ref Range    WBC 9.22 3.40 - 10.80 10*3/mm3    RBC 3.76 (L) 3.77 - 5.28 10*6/mm3    Hemoglobin 11.2 (L) 12.0 - 15.9 g/dL    Hematocrit 36.2 34.0 - 46.6 %    MCV 96.3 79.0 - 97.0 fL    MCH 29.8 26.6 - 33.0 pg    MCHC 30.9 (L) 31.5 - 35.7 g/dL    RDW 13.7 12.3 - 15.4 %    RDW-SD 48.4 37.0 - 54.0 fl    MPV 10.1 6.0 - 12.0 fL    Platelets 216 140 - 450 10*3/mm3    Neutrophil % 70.7 42.7 - 76.0 %    Lymphocyte % 27.3 19.6 - 45.3 %    Monocyte % 1.6 (L) 5.0 - 12.0 %    Eosinophil % 0.0 (L) 0.3 - 6.2 %    Basophil % 0.1 0.0 - 1.5 %    Immature Grans % 0.3 0.0 - 0.5 %    Neutrophils, Absolute 6.51 1.70 - 7.00 10*3/mm3    Lymphocytes, Absolute 2.52 0.70 - 3.10 10*3/mm3     Monocytes, Absolute 0.15 0.10 - 0.90 10*3/mm3    Eosinophils, Absolute 0.00 0.00 - 0.40 10*3/mm3    Basophils, Absolute 0.01 0.00 - 0.20 10*3/mm3    Immature Grans, Absolute 0.03 0.00 - 0.05 10*3/mm3    nRBC 0.1 0.0 - 0.2 /100 WBC       I ordered the above labs and reviewed the results.    RADIOLOGY  Xr Chest 1 View    Result Date: 11/9/2020  ONE VIEW PORTABLE CHEST  HISTORY: Shortness of breath. Possible COVID-19 pneumonia.  FINDINGS:  There is marked elevation of the left hemidiaphragm with some probable atelectasis at the left base and this is unchanged from 10/13/2020. The heart remains enlarged with a pacemaker in place. No new abnormality is seen.  This report was finalized on 11/9/2020 1:38 PM by Dr. Guille Trotter M.D.        I ordered the above noted radiological studies. I reviewed the images and results. I agree with the radiologist interpretation.    PROCEDURES  Procedures    MEDICATIONS GIVEN IN ER  Medications   albuterol (PROVENTIL) nebulizer solution 0.083% 2.5 mg/3mL (2.5 mg Nebulization Given 11/9/20 1451)   ipratropium-albuterol (DUO-NEB) nebulizer solution 3 mL (3 mL Nebulization Given 11/9/20 1452)       PROGRESS, DATA ANALYSIS, CONSULTS, AND MEDICAL DECISION MAKING  A complete history and physical exam have been performed.  All available laboratory and imaging results have been reviewed by myself prior to disposition.    MDM  After the initial H&P, I discussed pertinent information from history and physical exam with patient/family.  Discussed differential diagnosis.  Discussed plan for ED evaluation/work-up/treatment.  All questions answered.  Patient/family is agreeable with plan.  ED Course as of Nov 09 1510   Mon Nov 09, 2020   1035 Patient was placed in face mask in first look. Patient was wearing facemask when I entered the room and throughout our encounter. I wore full protective equipment throughout this patient encounter including a face mask, eye shield, gown and gloves. Hand hygiene  was performed before donning protective equipment and after removal when leaving the room.        [JG]   1154 EKG independently viewed and contemporaneously interpreted by ED physician. Time: 11:48 AM.  Rate 80.  Interpretation: Atrially sensed, ventricularly placed.  Left axis deviation.  Wide complex, left bundle branch block morphology, appropriate discordant ST changes.  Sgarbossa criteria negative.  No significant change from prior 9/29/2020.    [JG]   1343 Patient Covid test is negative, x-ray is unremarkable.  Discontinuing isolation.    [JG]   1509 Patient reassessed.  Discussed ED work-up and results up to present.  Patient reports that she had had resolution of her shortness of breath after being given breathing treatments while in route, complains of return of mild shortness of breath at present.  Repeat lung exam: Patient has good air movement throughout, no accessory muscle use, does have end expiratory wheeze.  Given patient is having dyspnea with wheezing that is responsive to albuterol, ED work-up is otherwise negative, diagnosed with acute asthma exacerbation.  Patient reports that she had a prescription for steroids called in by her pulmonologist, took first dose today.  Will give breathing treatment here but then patient is well-appearing, appears appropriate for discharge.  Patient will continue with the steroids as prescribed by her pulmonologist.  Plan for discharge with primary care and pulmonology follow-up.    [JG]      ED Course User Index  [JG] Joaquin Garcia MD       AS OF 15:10 EST VITALS:    BP - 105/59  HR - 79  TEMP - 98.9 °F (37.2 °C) (Oral)  O2 SATS - 99%    DIAGNOSIS  Final diagnoses:   Mild intermittent asthma with acute exacerbation         DISPOSITION  DISCHARGE    Patient discharged in stable condition.    Reviewed implications of results, diagnosis, meds, responsibility to follow up, warning signs and symptoms of possible worsening, potential complications and reasons to  return to ER.    Patient/Family voiced understanding of above instructions.    Discussed plan for discharge, as there is no emergent indication for admission. Patient referred to primary care provider for BP management due to today's BP. Pt/family is agreeable and understands need for follow up and repeat testing.  Pt is aware that discharge does not mean that nothing is wrong but it indicates no emergency is present that requires admission and they must continue care with follow-up as given below or physician of their choice.     FOLLOW-UP  Zenaida Suarez MD  [address] [facility] [id number] [phone]    Schedule an appointment as soon as possible for a visit in 2 days  even if well    Rene Olivier MD  [address] [id number] [phone]    Schedule an appointment as soon as possible for a visit in 2 days           Medication List      No changes were made to your prescriptions during this visit.          Joaquin Garcia MD  11/09/20 5898

## 2020-11-10 ENCOUNTER — TELEPHONE (OUTPATIENT)
Dept: ONCOLOGY | Facility: CLINIC | Age: 85
End: 2020-11-10

## 2020-11-10 ENCOUNTER — ANTICOAGULATION VISIT (OUTPATIENT)
Dept: PHARMACY | Facility: HOSPITAL | Age: 85
End: 2020-11-10

## 2020-11-10 DIAGNOSIS — I48.0 PAROXYSMAL ATRIAL FIBRILLATION (HCC): ICD-10-CM

## 2020-11-10 LAB — INR PPP: 2.1

## 2020-11-10 NOTE — TELEPHONE ENCOUNTER
PT CALLING REGARDING APPOINTMENT SCHEDULED 12/23/20 FOR A PORT FLUSH.    PT DOES NOT CURRENTLY HAVE A PORT.     CLARIFICATION IF PT IS REC PORT  @ THE APPT OR WILL EXPLAIN.    CONTACT #[phone]

## 2020-11-10 NOTE — PROGRESS NOTES
Anticoagulation Clinic Progress Note    Anticoagulation Summary  As of 11/10/2020    INR goal:  2.0-3.0   TTR:  75.6 % (1.9 y)   INR used for dosin.10 (11/10/2020)   Warfarin maintenance plan:  4 mg every Mon, Fri; 2 mg all other days   Weekly warfarin total:  18 mg   No change documented:  Alexander Valiente RPH   Plan last modified:  Alexander Valiente RPH (2020)   Next INR check:  2020   Priority:  Maintenance   Target end date:  Indefinite    Indications    Paroxysmal atrial fibrillation (CMS/HCC) [I48.0]             Anticoagulation Episode Summary     INR check location:      Preferred lab:      Send INR reminders to:  [facility] CLINICAL POOL    Comments:  [facility] beginning 20      Anticoagulation Care Providers     Provider Role Specialty Phone number    Rommel Veliz MD Referring Cardiology [phone]        Pertinent info:  Started prednisone taper  r/t asthma.     INR History:  Anticoagulation Monitoring 2020 2020 11/10/2020   INR 3.20 2.40 2.10   INR Date 2020 2020 11/10/2020   INR Goal 2.0-3.0 2.0-3.0 2.0-3.0   Trend Same Same Same   Last Week Total 18 mg 14 mg 18 mg   Next Week Total 14 mg 18 mg 18 mg   Sun 2 mg 2 mg -   Mon - 4 mg -   Tue - 2 mg 2 mg   Wed Hold () 2 mg 2 mg   Thu 2 mg 2 mg 2 mg   Fri 2 mg () 4 mg -   Sat 2 mg 2 mg -   Visit Report - - -   Some recent data might be hidden       Plan:  1. INR is Therapeutic today- see above in Anticoagulation Summary.   Provided instructions to Maria Alejandra with [facility] to Continue their warfarin regimen- see above in Anticoagulation Summary.  2. Follow up in 3 days      Alexander Valiente RPH

## 2020-11-13 ENCOUNTER — ANTICOAGULATION VISIT (OUTPATIENT)
Dept: PHARMACY | Facility: HOSPITAL | Age: 85
End: 2020-11-13

## 2020-11-13 DIAGNOSIS — I48.0 PAROXYSMAL ATRIAL FIBRILLATION (HCC): ICD-10-CM

## 2020-11-13 LAB — INR PPP: 2

## 2020-11-13 NOTE — PROGRESS NOTES
Anticoagulation Clinic Progress Note    Anticoagulation Summary  As of 2020    INR goal:  2.0-3.0   TTR:  75.7 % (1.9 y)   INR used for dosin.00 (2020)   Warfarin maintenance plan:  4 mg every Mon, Fri; 2 mg all other days   Weekly warfarin total:  18 mg   Plan last modified:  Alexander Valiente RPH (2020)   Next INR check:  2020   Priority:  Maintenance   Target end date:  Indefinite    Indications    Paroxysmal atrial fibrillation (CMS/HCC) [I48.0]             Anticoagulation Episode Summary     INR check location:      Preferred lab:      Send INR reminders to:  [facility] POOL    Comments:  [facility] beginning 20      Anticoagulation Care Providers     Provider Role Specialty Phone number    Rommel Veliz MD Referring Cardiology [phone]          Clinic Interview:  Patient Findings     Positives:  Change in medications    Negatives:  Signs/symptoms of thrombosis, Signs/symptoms of bleeding,   Laboratory test error suspected, Change in health, Change in alcohol use,   Change in activity, Upcoming invasive procedure, Emergency department   visit, Upcoming dental procedure, Missed doses, Extra doses, Change in   diet/appetite, Hospital admission, Bruising, Other complaints    Comments:  Steroid taper will be complete next Thursday,       Clinical Outcomes     Negatives:  Major bleeding event, Thromboembolic event,   Anticoagulation-related hospital admission, Anticoagulation-related ED   visit, Anticoagulation-related fatality    Comments:  Steroid taper will be complete next Thursday,         INR History:  Anticoagulation Monitoring 2020 11/10/2020 2020   INR 2.40 2.10 2.00   INR Date 2020 11/10/2020 2020   INR Goal 2.0-3.0 2.0-3.0 2.0-3.0   Trend Same Same Same   Last Week Total 14 mg 18 mg 18 mg   Next Week Total 18 mg 18 mg 18 mg   Sun 2 mg - 2 mg   Mon 4 mg - 4 mg   Tue 2 mg 2 mg -   Wed 2 mg 2 mg -   Thu 2 mg 2 mg -    Fri 4 mg - 4 mg   Sat 2 mg - 2 mg   Visit Report - - -   Some recent data might be hidden       Plan:  1. INR is Therapeutic today- see above in Anticoagulation Summary.   Will instruct Caitlyn Longoria to Continue their warfarin regimen- see above in Anticoagulation Summary.  2. Follow up in 4 days  3. Spoke with patient and home health nurse, Tricia. They have been instructed to call if any changes in medications, doses, concerns, etc. Patient expresses understanding and has no further questions at this time.    Ester Holbrook, [facility]   11-Jan-2017

## 2020-11-14 ENCOUNTER — LAB REQUISITION (OUTPATIENT)
Dept: LAB | Facility: HOSPITAL | Age: 85
End: 2020-11-14

## 2020-11-14 DIAGNOSIS — Z00.00 ENCOUNTER FOR GENERAL ADULT MEDICAL EXAMINATION WITHOUT ABNORMAL FINDINGS: ICD-10-CM

## 2020-11-14 LAB
25(OH)D3 SERPL-MCNC: 27.9 NG/ML (ref 30–100)
ALBUMIN SERPL-MCNC: 4.3 G/DL (ref 3.5–5.2)
ALBUMIN/GLOB SERPL: 2.7 G/DL
ALP SERPL-CCNC: 96 U/L (ref 39–117)
ALT SERPL W P-5'-P-CCNC: 19 U/L (ref 1–33)
ANION GAP SERPL CALCULATED.3IONS-SCNC: 12.1 MMOL/L (ref 5–15)
AST SERPL-CCNC: 21 U/L (ref 1–32)
BACTERIA SPEC AEROBE CULT: NORMAL
BACTERIA SPEC AEROBE CULT: NORMAL
BASOPHILS # BLD AUTO: 0.02 10*3/MM3 (ref 0–0.2)
BASOPHILS NFR BLD AUTO: 0.1 % (ref 0–1.5)
BILIRUB SERPL-MCNC: 0.6 MG/DL (ref 0–1.2)
BUN SERPL-MCNC: 27 MG/DL (ref 8–23)
BUN/CREAT SERPL: 26.7 (ref 7–25)
CALCIUM SPEC-SCNC: 8.7 MG/DL (ref 8.6–10.5)
CALCIUM SPEC-SCNC: 8.7 MG/DL (ref 8.6–10.5)
CHLORIDE SERPL-SCNC: 102 MMOL/L (ref 98–107)
CHOLEST SERPL-MCNC: 88 MG/DL (ref 0–200)
CO2 SERPL-SCNC: 24.9 MMOL/L (ref 22–29)
CREAT SERPL-MCNC: 1.01 MG/DL (ref 0.57–1)
DEPRECATED RDW RBC AUTO: 46.6 FL (ref 37–54)
EOSINOPHIL # BLD AUTO: 0 10*3/MM3 (ref 0–0.4)
EOSINOPHIL NFR BLD AUTO: 0 % (ref 0.3–6.2)
ERYTHROCYTE [DISTWIDTH] IN BLOOD BY AUTOMATED COUNT: 13.9 % (ref 12.3–15.4)
GFR SERPL CREATININE-BSD FRML MDRD: 52 ML/MIN/1.73
GLOBULIN UR ELPH-MCNC: 1.6 GM/DL
GLUCOSE SERPL-MCNC: 191 MG/DL (ref 65–99)
HBA1C MFR BLD: 7.3 % (ref 4.8–5.6)
HCT VFR BLD AUTO: 34.7 % (ref 34–46.6)
HDLC SERPL-MCNC: 47 MG/DL (ref 40–60)
HGB BLD-MCNC: 11 G/DL (ref 12–15.9)
IMM GRANULOCYTES # BLD AUTO: 0.09 10*3/MM3 (ref 0–0.05)
IMM GRANULOCYTES NFR BLD AUTO: 0.7 % (ref 0–0.5)
LDLC SERPL CALC-MCNC: 23 MG/DL (ref 0–100)
LDLC/HDLC SERPL: 0.49 {RATIO}
LYMPHOCYTES # BLD AUTO: 6.15 10*3/MM3 (ref 0.7–3.1)
LYMPHOCYTES NFR BLD AUTO: 44.6 % (ref 19.6–45.3)
MCH RBC QN AUTO: 29.7 PG (ref 26.6–33)
MCHC RBC AUTO-ENTMCNC: 31.7 G/DL (ref 31.5–35.7)
MCV RBC AUTO: 93.8 FL (ref 79–97)
MONOCYTES # BLD AUTO: 0.3 10*3/MM3 (ref 0.1–0.9)
MONOCYTES NFR BLD AUTO: 2.2 % (ref 5–12)
NEUTROPHILS NFR BLD AUTO: 52.4 % (ref 42.7–76)
NEUTROPHILS NFR BLD AUTO: 7.23 10*3/MM3 (ref 1.7–7)
NRBC BLD AUTO-RTO: 0 /100 WBC (ref 0–0.2)
PLATELET # BLD AUTO: 223 10*3/MM3 (ref 140–450)
PMV BLD AUTO: 10.1 FL (ref 6–12)
POTASSIUM SERPL-SCNC: 4.6 MMOL/L (ref 3.5–5.2)
PROT SERPL-MCNC: 5.9 G/DL (ref 6–8.5)
PTH-INTACT SERPL-MCNC: 130 PG/ML (ref 15–65)
RBC # BLD AUTO: 3.7 10*6/MM3 (ref 3.77–5.28)
SODIUM SERPL-SCNC: 139 MMOL/L (ref 136–145)
TRIGL SERPL-MCNC: 91 MG/DL (ref 0–150)
TSH SERPL DL<=0.05 MIU/L-ACNC: 0.97 UIU/ML (ref 0.27–4.2)
VIT B12 BLD-MCNC: 404 PG/ML (ref 211–946)
VLDLC SERPL-MCNC: 18 MG/DL (ref 5–40)
WBC # BLD AUTO: 13.79 10*3/MM3 (ref 3.4–10.8)

## 2020-11-14 PROCEDURE — 85025 COMPLETE CBC W/AUTO DIFF WBC: CPT | Performed by: INTERNAL MEDICINE

## 2020-11-14 PROCEDURE — 82607 VITAMIN B-12: CPT | Performed by: INTERNAL MEDICINE

## 2020-11-14 PROCEDURE — 82306 VITAMIN D 25 HYDROXY: CPT | Performed by: INTERNAL MEDICINE

## 2020-11-14 PROCEDURE — 83036 HEMOGLOBIN GLYCOSYLATED A1C: CPT | Performed by: INTERNAL MEDICINE

## 2020-11-14 PROCEDURE — 80061 LIPID PANEL: CPT | Performed by: INTERNAL MEDICINE

## 2020-11-14 PROCEDURE — 83970 ASSAY OF PARATHORMONE: CPT | Performed by: INTERNAL MEDICINE

## 2020-11-14 PROCEDURE — 80053 COMPREHEN METABOLIC PANEL: CPT | Performed by: INTERNAL MEDICINE

## 2020-11-14 PROCEDURE — 84443 ASSAY THYROID STIM HORMONE: CPT | Performed by: INTERNAL MEDICINE

## 2020-11-17 ENCOUNTER — ANTICOAGULATION VISIT (OUTPATIENT)
Dept: PHARMACY | Facility: HOSPITAL | Age: 85
End: 2020-11-17

## 2020-11-17 DIAGNOSIS — I48.0 PAROXYSMAL ATRIAL FIBRILLATION (HCC): ICD-10-CM

## 2020-11-17 LAB
INR PPP: 2.5
MYCOBACTERIUM SPEC CULT: NORMAL
NIGHT BLUE STAIN TISS: NORMAL

## 2020-11-17 NOTE — PROGRESS NOTES
Anticoagulation Clinic Progress Note    Anticoagulation Summary  As of 2020    INR goal:  2.0-3.0   TTR:  75.9 % (1.9 y)   INR used for dosin.50 (2020)   Warfarin maintenance plan:  4 mg every Mon, Fri; 2 mg all other days   Weekly warfarin total:  18 mg   No change documented:  Alexander Valiente RPH   Plan last modified:  Alexander Valiente RPH (2020)   Next INR check:  2020   Priority:  Maintenance   Target end date:  Indefinite    Indications    Paroxysmal atrial fibrillation (CMS/HCC) [I48.0]             Anticoagulation Episode Summary     INR check location:      Preferred lab:      Send INR reminders to:  [facility] CLINICAL [place]    Comments:  [facility] lab beginning 20      Anticoagulation Care Providers     Provider Role Specialty Phone number    Rommel Veliz MD Referring Cardiology [phone]        Pertinent info:  Steroid taper will be complete     INR History:  Anticoagulation Monitoring 11/10/2020 2020 2020   INR 2.10 2.00 2.50   INR Date 11/10/2020 2020 2020   INR Goal 2.0-3.0 2.0-3.0 2.0-3.0   Trend Same Same Same   Last Week Total 18 mg 18 mg 18 mg   Next Week Total 18 mg 18 mg 18 mg   Sun - 2 mg 2 mg   Mon - 4 mg 4 mg   Tue 2 mg - 2 mg   Wed 2 mg - 2 mg   Thu 2 mg - 2 mg   Fri - 4 mg 4 mg   Sat - 2 mg 2 mg   Visit Report - - -   Some recent data might be hidden       Plan:  1. INR is Therapeutic today- see above in Anticoagulation Summary.   Provided instructions to Akhil with [facility] to Continue their warfarin regimen- see above in Anticoagulation Summary.  2. Follow up in 1 week      Alexander Valiente RPH

## 2020-11-20 LAB
MYCOBACTERIUM SPEC CULT: NORMAL
NIGHT BLUE STAIN TISS: NORMAL

## 2020-11-25 ENCOUNTER — INFUSION (OUTPATIENT)
Dept: ONCOLOGY | Facility: HOSPITAL | Age: 85
End: 2020-11-25

## 2020-11-25 VITALS
OXYGEN SATURATION: 98 % | DIASTOLIC BLOOD PRESSURE: 78 MMHG | WEIGHT: 210.6 LBS | HEART RATE: 94 BPM | BODY MASS INDEX: 38.52 KG/M2 | SYSTOLIC BLOOD PRESSURE: 119 MMHG | RESPIRATION RATE: 18 BRPM | TEMPERATURE: 96.8 F

## 2020-11-25 DIAGNOSIS — C91.12 CLL (CHRONIC LYMPHOID LEUKEMIA) IN RELAPSE (HCC): Primary | ICD-10-CM

## 2020-11-25 LAB
BASOPHILS # BLD AUTO: 0.01 10*3/MM3 (ref 0–0.2)
BASOPHILS NFR BLD AUTO: 0.1 % (ref 0–1.5)
DEPRECATED RDW RBC AUTO: 53.7 FL (ref 37–54)
EOSINOPHIL # BLD AUTO: 0 10*3/MM3 (ref 0–0.4)
EOSINOPHIL NFR BLD AUTO: 0 % (ref 0.3–6.2)
ERYTHROCYTE [DISTWIDTH] IN BLOOD BY AUTOMATED COUNT: 14.9 % (ref 12.3–15.4)
HCT VFR BLD AUTO: 36.9 % (ref 34–46.6)
HGB BLD-MCNC: 11.1 G/DL (ref 12–15.9)
IGA1 MFR SER: 15 MG/DL (ref 70–400)
IGG1 SER-MCNC: 664 MG/DL (ref 700–1600)
IGM SERPL-MCNC: 26 MG/DL (ref 40–230)
IMM GRANULOCYTES # BLD AUTO: 0.05 10*3/MM3 (ref 0–0.05)
IMM GRANULOCYTES NFR BLD AUTO: 0.5 % (ref 0–0.5)
LYMPHOCYTES # BLD AUTO: 5.14 10*3/MM3 (ref 0.7–3.1)
LYMPHOCYTES NFR BLD AUTO: 48.1 % (ref 19.6–45.3)
MCH RBC QN AUTO: 29.4 PG (ref 26.6–33)
MCHC RBC AUTO-ENTMCNC: 30.1 G/DL (ref 31.5–35.7)
MCV RBC AUTO: 97.9 FL (ref 79–97)
MONOCYTES # BLD AUTO: 0.25 10*3/MM3 (ref 0.1–0.9)
MONOCYTES NFR BLD AUTO: 2.3 % (ref 5–12)
NEUTROPHILS NFR BLD AUTO: 49 % (ref 42.7–76)
NEUTROPHILS NFR BLD AUTO: 5.24 10*3/MM3 (ref 1.7–7)
NRBC BLD AUTO-RTO: 0 /100 WBC (ref 0–0.2)
PLATELET # BLD AUTO: 145 10*3/MM3 (ref 140–450)
PMV BLD AUTO: 10 FL (ref 6–12)
RBC # BLD AUTO: 3.77 10*6/MM3 (ref 3.77–5.28)
WBC # BLD AUTO: 10.69 10*3/MM3 (ref 3.4–10.8)

## 2020-11-25 PROCEDURE — 96365 THER/PROPH/DIAG IV INF INIT: CPT

## 2020-11-25 PROCEDURE — 25010000002 IMMUNE GLOBULIN (HUMAN) 30 GM/300ML SOLUTION: Performed by: NURSE PRACTITIONER

## 2020-11-25 PROCEDURE — 85025 COMPLETE CBC W/AUTO DIFF WBC: CPT

## 2020-11-25 PROCEDURE — 82784 ASSAY IGA/IGD/IGG/IGM EACH: CPT | Performed by: INTERNAL MEDICINE

## 2020-11-25 PROCEDURE — 96366 THER/PROPH/DIAG IV INF ADDON: CPT

## 2020-11-25 RX ORDER — HYDROXYZINE HYDROCHLORIDE 25 MG/1
25 TABLET, FILM COATED ORAL ONCE
Status: COMPLETED | OUTPATIENT
Start: 2020-11-25 | End: 2020-11-25

## 2020-11-25 RX ORDER — DIPHENHYDRAMINE HYDROCHLORIDE 50 MG/ML
50 INJECTION INTRAMUSCULAR; INTRAVENOUS AS NEEDED
Status: CANCELLED | OUTPATIENT
Start: 2020-11-25

## 2020-11-25 RX ORDER — SODIUM CHLORIDE 9 MG/ML
250 INJECTION, SOLUTION INTRAVENOUS ONCE
Status: COMPLETED | OUTPATIENT
Start: 2020-11-25 | End: 2020-11-25

## 2020-11-25 RX ORDER — MEPERIDINE HYDROCHLORIDE 50 MG/ML
25 INJECTION INTRAMUSCULAR; INTRAVENOUS; SUBCUTANEOUS
Status: CANCELLED | OUTPATIENT
Start: 2020-11-25

## 2020-11-25 RX ORDER — FAMOTIDINE 10 MG/ML
20 INJECTION, SOLUTION INTRAVENOUS AS NEEDED
Status: CANCELLED | OUTPATIENT
Start: 2020-11-25

## 2020-11-25 RX ORDER — ACETAMINOPHEN 325 MG/1
650 TABLET ORAL ONCE
Status: COMPLETED | OUTPATIENT
Start: 2020-11-25 | End: 2020-11-25

## 2020-11-25 RX ADMIN — SODIUM CHLORIDE 250 ML: 9 INJECTION, SOLUTION INTRAVENOUS at 11:21

## 2020-11-25 RX ADMIN — IMMUNE GLOBULIN INFUSION (HUMAN) 30 G: 100 INJECTION, SOLUTION INTRAVENOUS; SUBCUTANEOUS at 11:43

## 2020-11-25 RX ADMIN — HYDROXYZINE HYDROCHLORIDE 25 MG: 25 TABLET, FILM COATED ORAL at 11:21

## 2020-11-25 RX ADMIN — ACETAMINOPHEN 650 MG: 325 TABLET ORAL at 11:21

## 2020-11-27 ENCOUNTER — ANTICOAGULATION VISIT (OUTPATIENT)
Dept: PHARMACY | Facility: HOSPITAL | Age: 85
End: 2020-11-27

## 2020-11-27 ENCOUNTER — LAB REQUISITION (OUTPATIENT)
Dept: LAB | Facility: HOSPITAL | Age: 85
End: 2020-11-27

## 2020-11-27 DIAGNOSIS — Z00.00 ENCOUNTER FOR GENERAL ADULT MEDICAL EXAMINATION WITHOUT ABNORMAL FINDINGS: ICD-10-CM

## 2020-11-27 DIAGNOSIS — I48.0 PAROXYSMAL ATRIAL FIBRILLATION (HCC): ICD-10-CM

## 2020-11-27 LAB
ALBUMIN UR-MCNC: <1.2 MG/DL
INR PPP: 1.7

## 2020-11-27 PROCEDURE — 82043 UR ALBUMIN QUANTITATIVE: CPT | Performed by: INTERNAL MEDICINE

## 2020-11-27 NOTE — PROGRESS NOTES
Anticoagulation Clinic Progress Note    Anticoagulation Summary  As of 2020    INR goal:  2.0-3.0   TTR:  75.7 % (2 y)   INR used for dosin.70 (2020)   Warfarin maintenance plan:  4 mg every Mon, Fri; 2 mg all other days   Weekly warfarin total:  18 mg   No change documented:  Alexander Valiente RPH   Plan last modified:  Alexander Valiente RPH (2020)   Next INR check:  2020   Priority:  Maintenance   Target end date:  Indefinite    Indications    Paroxysmal atrial fibrillation (CMS/HCC) [I48.0]             Anticoagulation Episode Summary     INR check location:      Preferred lab:      Send INR reminders to:  [facility] CLINICAL [place]    Comments:  [facility] Home lab beginning 20      Anticoagulation Care Providers     Provider Role Specialty Phone number    Rommel Veliz MD Referring Cardiology [phone]          INR History:  Anticoagulation Monitoring 2020   INR 2.00 2.50 1.70   INR Date 2020   INR Goal 2.0-3.0 2.0-3.0 2.0-3.0   Trend Same Same Same   Last Week Total 18 mg 18 mg 18 mg   Next Week Total 18 mg 18 mg 18 mg   Sun 2 mg 2 mg 2 mg   Mon 4 mg 4 mg 4 mg   Tue - 2 mg 2 mg   Wed - 2 mg -   Thu - 2 mg -   Fri 4 mg 4 mg 4 mg   Sat 2 mg 2 mg 2 mg   Visit Report - - -   Some recent data might be hidden       Plan:  1. INR is Subtherapeutic today- see above in Anticoagulation Summary.   Provided instructions to Tricia with [facility] to Continue their warfarin regimen (due for higher 4 mg dose today) - see above in Anticoagulation Summary.  2. Follow up in 5 days      Alexander Valiente RPH

## 2020-12-02 ENCOUNTER — ANTICOAGULATION VISIT (OUTPATIENT)
Dept: PHARMACY | Facility: HOSPITAL | Age: 85
End: 2020-12-02

## 2020-12-02 DIAGNOSIS — I48.0 PAROXYSMAL ATRIAL FIBRILLATION (HCC): ICD-10-CM

## 2020-12-02 LAB — INR PPP: 2

## 2020-12-02 NOTE — PROGRESS NOTES
Anticoagulation Clinic Progress Note    Anticoagulation Summary  As of 2020    INR goal:  2.0-3.0   TTR:  75.2 % (2 y)   INR used for dosin.00 (2020)   Warfarin maintenance plan:  4 mg every Mon, Fri; 2 mg all other days   Weekly warfarin total:  18 mg   No change documented:  Ester Holbrook RPH   Plan last modified:  Alexander Valiente RPH (2020)   Next INR check:  2020   Priority:  Maintenance   Target end date:  Indefinite    Indications    Paroxysmal atrial fibrillation (CMS/HCC) [I48.0]             Anticoagulation Episode Summary     INR check location:      Preferred lab:      [facility]    Comments:  [facility] Home lab beginning 20      Anticoagulation Care Providers     Provider Role Specialty Phone number    Rommel Veliz MD Referring Cardiology [phone]          Clinic Interview:  Patient Findings     Negatives:  Signs/symptoms of thrombosis, Signs/symptoms of bleeding,   Laboratory test error suspected, Change in health, Change in alcohol use,   Change in activity, Upcoming invasive procedure, Emergency department   visit, Upcoming dental procedure, Missed doses, Extra doses, Change in   medications, Change in diet/appetite, Hospital admission, Bruising, Other   complaints      Clinical Outcomes     Negatives:  Major bleeding event, Thromboembolic event,   Anticoagulation-related hospital admission, Anticoagulation-related ED   visit, Anticoagulation-related fatality        INR History:  Anticoagulation Monitoring 2020   INR 2.50 1.70 2.00   INR Date 2020   INR Goal 2.0-3.0 2.0-3.0 2.0-3.0   Trend Same Same Same   Last Week Total 18 mg 18 mg 18 mg   Next Week Total 18 mg 18 mg 18 mg   Sun 2 mg 2 mg 2 mg   Mon 4 mg 4 mg 4 mg   Tue 2 mg 2 mg 2 mg   Wed 2 mg - 2 mg   Thu 2 mg - 2 mg   Fri 4 mg 4 mg 4 mg   Sat 2 mg 2 mg 2 mg   Visit Report - - -   Some recent data might be  hidden       Plan:  1. INR is Therapeutic today- see above in Anticoagulation Summary.   Will instruct Caitlyn Longoria to Continue their warfarin regimen- see above in Anticoagulation Summary.  2. Follow up in 1 week  3. Spoke with patient and Tricia with [facility]. They have been instructed to call if any changes in medications, doses, concerns, etc. Patient expresses understanding and has no further questions at this time.    Ester Holbrook, [facility]

## 2020-12-03 ENCOUNTER — LAB REQUISITION (OUTPATIENT)
Dept: LAB | Facility: HOSPITAL | Age: 85
End: 2020-12-03

## 2020-12-03 DIAGNOSIS — N39.0 URINARY TRACT INFECTION, SITE NOT SPECIFIED: ICD-10-CM

## 2020-12-03 LAB
BACTERIA UR QL AUTO: ABNORMAL /HPF
BILIRUB UR QL STRIP: NEGATIVE
CLARITY UR: CLEAR
COLOR UR: YELLOW
GLUCOSE UR STRIP-MCNC: NEGATIVE MG/DL
HGB UR QL STRIP.AUTO: ABNORMAL
HYALINE CASTS UR QL AUTO: ABNORMAL /LPF
KETONES UR QL STRIP: NEGATIVE
LEUKOCYTE ESTERASE UR QL STRIP.AUTO: ABNORMAL
NITRITE UR QL STRIP: NEGATIVE
PH UR STRIP.AUTO: <=5 [PH] (ref 5–8)
PROT UR QL STRIP: NEGATIVE
RBC # UR: ABNORMAL /HPF
REF LAB TEST METHOD: ABNORMAL
SP GR UR STRIP: 1.01 (ref 1–1.03)
SQUAMOUS #/AREA URNS HPF: ABNORMAL /HPF
UROBILINOGEN UR QL STRIP: ABNORMAL
WBC UR QL AUTO: ABNORMAL /HPF

## 2020-12-03 PROCEDURE — 87086 URINE CULTURE/COLONY COUNT: CPT | Performed by: INTERNAL MEDICINE

## 2020-12-03 PROCEDURE — 81001 URINALYSIS AUTO W/SCOPE: CPT | Performed by: INTERNAL MEDICINE

## 2020-12-05 LAB — BACTERIA SPEC AEROBE CULT: NO GROWTH

## 2020-12-06 DIAGNOSIS — I48.92 ATRIAL FIBRILLATION AND FLUTTER (HCC): ICD-10-CM

## 2020-12-06 DIAGNOSIS — I48.91 ATRIAL FIBRILLATION AND FLUTTER (HCC): ICD-10-CM

## 2020-12-08 RX ORDER — CARVEDILOL 3.12 MG/1
TABLET ORAL
Qty: 180 TABLET | Refills: 1 | Status: SHIPPED | OUTPATIENT
Start: 2020-12-08 | End: 2021-06-01

## 2020-12-09 ENCOUNTER — ANTICOAGULATION VISIT (OUTPATIENT)
Dept: PHARMACY | Facility: HOSPITAL | Age: 85
End: 2020-12-09

## 2020-12-09 DIAGNOSIS — I48.0 PAROXYSMAL ATRIAL FIBRILLATION (HCC): ICD-10-CM

## 2020-12-09 LAB — INR PPP: 1.8

## 2020-12-09 NOTE — PROGRESS NOTES
Anticoagulation Clinic Progress Note    Anticoagulation Summary  As of 2020    INR goal:  2.0-3.0   TTR:  74.4 % (2 y)   INR used for dosin.80 (2020)   Warfarin maintenance plan:  4 mg every Mon, Wed, Fri; 2 mg all other days   Weekly warfarin total:  20 mg   Plan last modified:  Alexander Valiente RPH (2020)   Next INR check:  2020   Priority:  Maintenance   Target end date:  Indefinite    Indications    Paroxysmal atrial fibrillation (CMS/HCC) [I48.0]             Anticoagulation Episode Summary     INR check location:      Preferred lab:      Send INR reminders to:   [facility] CLINICAL [place]    Comments:  [facility] lab beginning 20      Anticoagulation Care Providers     Provider Role Specialty Phone number    Rommel Veliz MD Referring Cardiology [phone]        Pertinent info:  Finished prednisone last week.     INR History:  Anticoagulation Monitoring 2020   INR 1.70 2.00 1.80   INR Date 2020   INR Goal 2.0-3.0 2.0-3.0 2.0-3.0   Trend Same Same Up   Last Week Total 18 mg 18 mg 18 mg   Next Week Total 18 mg 18 mg 20 mg   Sun 2 mg 2 mg 2 mg   Mon 4 mg 4 mg 4 mg   Tue 2 mg 2 mg 2 mg   Wed - 2 mg 4 mg   Thu - 2 mg 2 mg   Fri 4 mg 4 mg 4 mg   Sat 2 mg 2 mg 2 mg   Visit Report - - -   Some recent data might be hidden       Plan:  1. INR is Subtherapeutic today- see above in Anticoagulation Summary.   Provided instructions to Hillary with [facility] to Increase their warfarin regimen- see above in Anticoagulation Summary.  2. Follow up in 1 week      Alexander Valiente RPH

## 2020-12-16 ENCOUNTER — LAB REQUISITION (OUTPATIENT)
Dept: LAB | Facility: HOSPITAL | Age: 85
End: 2020-12-16

## 2020-12-16 ENCOUNTER — ANTICOAGULATION VISIT (OUTPATIENT)
Dept: PHARMACY | Facility: HOSPITAL | Age: 85
End: 2020-12-16

## 2020-12-16 DIAGNOSIS — J96.21 ACUTE AND CHRONIC RESPIRATORY FAILURE WITH HYPOXIA (HCC): ICD-10-CM

## 2020-12-16 DIAGNOSIS — I48.0 PAROXYSMAL ATRIAL FIBRILLATION (HCC): ICD-10-CM

## 2020-12-16 LAB
ANION GAP SERPL CALCULATED.3IONS-SCNC: 10.6 MMOL/L (ref 5–15)
BUN SERPL-MCNC: 28 MG/DL (ref 8–23)
BUN/CREAT SERPL: 23.5 (ref 7–25)
CALCIUM SPEC-SCNC: 8.8 MG/DL (ref 8.6–10.5)
CHLORIDE SERPL-SCNC: 107 MMOL/L (ref 98–107)
CO2 SERPL-SCNC: 24.4 MMOL/L (ref 22–29)
CREAT SERPL-MCNC: 1.19 MG/DL (ref 0.57–1)
GFR SERPL CREATININE-BSD FRML MDRD: 43 ML/MIN/1.73
GLUCOSE SERPL-MCNC: 91 MG/DL (ref 65–99)
INR PPP: 1.9
POTASSIUM SERPL-SCNC: 4.5 MMOL/L (ref 3.5–5.2)
SODIUM SERPL-SCNC: 142 MMOL/L (ref 136–145)

## 2020-12-16 PROCEDURE — 80048 BASIC METABOLIC PNL TOTAL CA: CPT | Performed by: INTERNAL MEDICINE

## 2020-12-16 NOTE — PROGRESS NOTES
Anticoagulation Clinic Progress Note    Anticoagulation Summary  As of 2020    INR goal:  2.0-3.0   TTR:  73.7 % (2 y)   INR used for dosin.90 (2020)   Warfarin maintenance plan:  2 mg every Sun, Tue, Thu; 4 mg all other days   Weekly warfarin total:  22 mg   Plan last modified:  Alexander Valiente RPH (2020)   Next INR check:  2020   Priority:  Maintenance   Target end date:  Indefinite    Indications    Paroxysmal atrial fibrillation (CMS/HCC) [I48.0]             Anticoagulation Episode Summary     INR check location:      Preferred lab:      Send INR reminders to:  [facility] CLINICAL POOL    Comments:  [facility] Home lab beginning 20      Anticoagulation Care Providers     Provider Role Specialty Phone number    Rommel Veliz MD Referring Cardiology [phone]          INR History:  Anticoagulation Monitoring 2020   INR 2.00 1.80 1.90   INR Date 2020   INR Goal 2.0-3.0 2.0-3.0 2.0-3.0   Trend Same Up Up   Last Week Total 18 mg 18 mg 20 mg   Next Week Total 18 mg 20 mg 22 mg   Sun 2 mg 2 mg 2 mg   Mon 4 mg 4 mg 4 mg   Tue 2 mg 2 mg -   Wed 2 mg 4 mg 4 mg   Thu 2 mg 2 mg 2 mg   Fri 4 mg 4 mg 4 mg   Sat 2 mg 2 mg 4 mg   Visit Report - - -   Some recent data might be hidden       Plan:  1. INR is Subtherapeutic today- see above in Anticoagulation Summary.   Provided instructions to Hillary with [facility] to Increase their warfarin regimen- see above in Anticoagulation Summary.  2. Follow up in 1 week      Alexander Valiente RPH

## 2020-12-22 ENCOUNTER — ANTICOAGULATION VISIT (OUTPATIENT)
Dept: PHARMACY | Facility: HOSPITAL | Age: 85
End: 2020-12-22

## 2020-12-22 DIAGNOSIS — I48.0 PAROXYSMAL ATRIAL FIBRILLATION (HCC): ICD-10-CM

## 2020-12-22 LAB — INR PPP: 3.9

## 2020-12-22 NOTE — PROGRESS NOTES
Anticoagulation Clinic Progress Note    Anticoagulation Summary  As of 12/22/2020    INR goal:  2.0-3.0   TTR:  73.5 % (2 y)   INR used for dosing:  3.90 (12/22/2020)   Warfarin maintenance plan:  4 mg every Mon, Wed, Fri; 2 mg all other days   Weekly warfarin total:  20 mg   Plan last modified:  Alexander Valiente RPH (12/22/2020)   Next INR check:  12/29/2020   Priority:  Maintenance   Target end date:  Indefinite    Indications    Paroxysmal atrial fibrillation (CMS/HCC) [I48.0]             Anticoagulation Episode Summary     INR check location:      Preferred lab:      [facility]    Comments:  [facility] lab beginning 4/22/20      Anticoagulation Care Providers     Provider Role Specialty Phone number    Rommel Veliz MD Referring Cardiology [phone]        Pertinent info:  Reports 3 doses of metronidazole last week (old supply, self-treated UTI sx).    INR History:  Anticoagulation Monitoring 12/9/2020 12/16/2020 12/22/2020   INR 1.80 1.90 3.90   INR Date 12/9/2020 12/16/2020 12/22/2020   INR Goal 2.0-3.0 2.0-3.0 2.0-3.0   Trend Up Up Down   Last Week Total 18 mg 20 mg 22 mg   Next Week Total 20 mg 22 mg 18 mg   Sun 2 mg 2 mg 2 mg   Mon 4 mg 4 mg 4 mg   Tue 2 mg - Hold (12/22)   Wed 4 mg 4 mg 4 mg   Thu 2 mg 2 mg 2 mg   Fri 4 mg 4 mg 4 mg   Sat 2 mg 4 mg 2 mg   Visit Report - - -   Some recent data might be hidden       Plan:  1. INR is Supratherapeutic today- see above in Anticoagulation Summary.   Provided instructions to Maria Alejandra with [facility] to Change their warfarin regimen- see above in Anticoagulation Summary.  2. Follow up in 1 week      Alexander Valiente RPH

## 2020-12-23 ENCOUNTER — LAB (OUTPATIENT)
Dept: LAB | Facility: HOSPITAL | Age: 85
End: 2020-12-23

## 2020-12-23 ENCOUNTER — INFUSION (OUTPATIENT)
Dept: ONCOLOGY | Facility: HOSPITAL | Age: 85
End: 2020-12-23

## 2020-12-23 ENCOUNTER — OFFICE VISIT (OUTPATIENT)
Dept: ONCOLOGY | Facility: CLINIC | Age: 85
End: 2020-12-23

## 2020-12-23 VITALS
TEMPERATURE: 97.9 F | HEART RATE: 94 BPM | WEIGHT: 215.2 LBS | DIASTOLIC BLOOD PRESSURE: 67 MMHG | HEIGHT: 62 IN | OXYGEN SATURATION: 95 % | SYSTOLIC BLOOD PRESSURE: 111 MMHG | RESPIRATION RATE: 20 BRPM | BODY MASS INDEX: 39.6 KG/M2

## 2020-12-23 VITALS — HEART RATE: 84 BPM | DIASTOLIC BLOOD PRESSURE: 84 MMHG | SYSTOLIC BLOOD PRESSURE: 127 MMHG

## 2020-12-23 DIAGNOSIS — D80.1 HYPOGAMMAGLOBULINEMIA (HCC): ICD-10-CM

## 2020-12-23 DIAGNOSIS — C91.12 CLL (CHRONIC LYMPHOID LEUKEMIA) IN RELAPSE (HCC): Primary | ICD-10-CM

## 2020-12-23 DIAGNOSIS — C91.12 CLL (CHRONIC LYMPHOID LEUKEMIA) IN RELAPSE (HCC): ICD-10-CM

## 2020-12-23 LAB
BASOPHILS # BLD AUTO: 0.01 10*3/MM3 (ref 0–0.2)
BASOPHILS NFR BLD AUTO: 0.1 % (ref 0–1.5)
DEPRECATED RDW RBC AUTO: 56.5 FL (ref 37–54)
EOSINOPHIL # BLD AUTO: 0 10*3/MM3 (ref 0–0.4)
EOSINOPHIL NFR BLD AUTO: 0 % (ref 0.3–6.2)
ERYTHROCYTE [DISTWIDTH] IN BLOOD BY AUTOMATED COUNT: 15.6 % (ref 12.3–15.4)
HCT VFR BLD AUTO: 35.4 % (ref 34–46.6)
HGB BLD-MCNC: 10.7 G/DL (ref 12–15.9)
IGA1 MFR SER: 15 MG/DL (ref 70–400)
IGG1 SER-MCNC: 753 MG/DL (ref 700–1600)
IGM SERPL-MCNC: 26 MG/DL (ref 40–230)
IMM GRANULOCYTES # BLD AUTO: 0.03 10*3/MM3 (ref 0–0.05)
IMM GRANULOCYTES NFR BLD AUTO: 0.4 % (ref 0–0.5)
LYMPHOCYTES # BLD AUTO: 3.77 10*3/MM3 (ref 0.7–3.1)
LYMPHOCYTES NFR BLD AUTO: 44 % (ref 19.6–45.3)
MCH RBC QN AUTO: 29.3 PG (ref 26.6–33)
MCHC RBC AUTO-ENTMCNC: 30.2 G/DL (ref 31.5–35.7)
MCV RBC AUTO: 97 FL (ref 79–97)
MONOCYTES # BLD AUTO: 0.32 10*3/MM3 (ref 0.1–0.9)
MONOCYTES NFR BLD AUTO: 3.7 % (ref 5–12)
NEUTROPHILS NFR BLD AUTO: 4.43 10*3/MM3 (ref 1.7–7)
NEUTROPHILS NFR BLD AUTO: 51.8 % (ref 42.7–76)
NRBC BLD AUTO-RTO: 0 /100 WBC (ref 0–0.2)
PLATELET # BLD AUTO: 160 10*3/MM3 (ref 140–450)
PMV BLD AUTO: 9.3 FL (ref 6–12)
RBC # BLD AUTO: 3.65 10*6/MM3 (ref 3.77–5.28)
WBC # BLD AUTO: 8.56 10*3/MM3 (ref 3.4–10.8)

## 2020-12-23 PROCEDURE — 85025 COMPLETE CBC W/AUTO DIFF WBC: CPT

## 2020-12-23 PROCEDURE — 25010000002 IMMUNE GLOBULIN (HUMAN) 30 GM/300ML SOLUTION: Performed by: NURSE PRACTITIONER

## 2020-12-23 PROCEDURE — 99213 OFFICE O/P EST LOW 20 MIN: CPT | Performed by: NURSE PRACTITIONER

## 2020-12-23 PROCEDURE — 96366 THER/PROPH/DIAG IV INF ADDON: CPT

## 2020-12-23 PROCEDURE — 82784 ASSAY IGA/IGD/IGG/IGM EACH: CPT | Performed by: INTERNAL MEDICINE

## 2020-12-23 PROCEDURE — 36415 COLL VENOUS BLD VENIPUNCTURE: CPT

## 2020-12-23 PROCEDURE — 96365 THER/PROPH/DIAG IV INF INIT: CPT

## 2020-12-23 RX ORDER — HYDROXYZINE PAMOATE 25 MG/1
25 CAPSULE ORAL ONCE
Status: COMPLETED | OUTPATIENT
Start: 2020-12-23 | End: 2020-12-23

## 2020-12-23 RX ORDER — DIPHENHYDRAMINE HYDROCHLORIDE 50 MG/ML
50 INJECTION INTRAMUSCULAR; INTRAVENOUS AS NEEDED
Status: CANCELLED | OUTPATIENT
Start: 2020-12-23

## 2020-12-23 RX ORDER — ACETAMINOPHEN 325 MG/1
650 TABLET ORAL ONCE
Status: CANCELLED | OUTPATIENT
Start: 2020-12-23

## 2020-12-23 RX ORDER — ACETAMINOPHEN 325 MG/1
650 TABLET ORAL ONCE
Status: COMPLETED | OUTPATIENT
Start: 2020-12-23 | End: 2020-12-23

## 2020-12-23 RX ORDER — LORAZEPAM 0.5 MG/1
TABLET ORAL
COMMUNITY
Start: 2020-12-08 | End: 2023-03-22

## 2020-12-23 RX ORDER — SODIUM CHLORIDE 9 MG/ML
250 INJECTION, SOLUTION INTRAVENOUS ONCE
Status: COMPLETED | OUTPATIENT
Start: 2020-12-23 | End: 2020-12-23

## 2020-12-23 RX ORDER — HYDROXYZINE PAMOATE 25 MG/1
25 CAPSULE ORAL ONCE
Status: CANCELLED | OUTPATIENT
Start: 2020-12-23

## 2020-12-23 RX ORDER — SODIUM CHLORIDE 9 MG/ML
250 INJECTION, SOLUTION INTRAVENOUS ONCE
Status: CANCELLED | OUTPATIENT
Start: 2020-12-23

## 2020-12-23 RX ORDER — FAMOTIDINE 10 MG/ML
20 INJECTION, SOLUTION INTRAVENOUS AS NEEDED
Status: CANCELLED | OUTPATIENT
Start: 2020-12-23

## 2020-12-23 RX ADMIN — HYDROXYZINE PAMOATE 25 MG: 25 CAPSULE ORAL at 11:41

## 2020-12-23 RX ADMIN — IMMUNE GLOBULIN INFUSION (HUMAN) 30 G: 100 INJECTION, SOLUTION INTRAVENOUS; SUBCUTANEOUS at 12:08

## 2020-12-23 RX ADMIN — ACETAMINOPHEN 650 MG: 325 TABLET ORAL at 11:41

## 2020-12-23 RX ADMIN — SODIUM CHLORIDE 250 ML: 9 INJECTION, SOLUTION INTRAVENOUS at 11:43

## 2020-12-23 NOTE — PROGRESS NOTES
Subjective .     REASONS FOR FOLLOW UP:  1. chronic lymphocytic leukemia with recurrent lung infections    Referring MD:    Amarilis Suarez MD    History of Present Illness patient is an.age female with 2014 with stage 0 CLL which is never required treatments.    Over the last year however she has had multiple hospitalizations for lung infections predominantly viral probably also bacterial and at her last hospitalization in October we rechecked her gammaglobulins which were low and opted to start IV IgG monthly to see if this would keep her out of the hospital    Since continued on monthly IVIG which she has tolerated very well.  She reports she has not had significant infections.  She denies worsening shortness of breath, chest pain or cough.  She reports she has been able to decrease her oxygen requirements recently.  She resides at the [facility] in an independent living apartment.  She does question the COVID-19 vaccine.  Her pulmonologist suggested the decision was hers and did not offer much insight.  She denies fevers or chills, signs or symptoms of infection presently.    Past Medical History:   Diagnosis Date   • Aortic calcification (CMS/HCC)     mild, on echo 12/17/2017   • Aortic regurgitation     Trace   • Asthma    • Atrial fibrillation (CMS/HCC)    • CAD (coronary artery disease)    • Chronic combined systolic and diastolic congestive heart failure (CMS/HCC)    • CKD (chronic kidney disease) stage 3, GFR 30-59 ml/min    • Coronary artery disease involving native coronary artery of native heart with angina pectoris (CMS/HCC)    • Disc degeneration, lumbar    • DM type 2 (diabetes mellitus, type 2) (CMS/HCC)    • GERD (gastroesophageal reflux disease)    • History of aneurysm     right femoral artery s/p LHC   • History of blood transfusion    • History of fracture    • History of vitamin D deficiency    • Hyperlipidemia    • Hypertension    • Mild mitral regurgitation    • Mitral annular  calcification     12/8/2017- echo, moderate   • PAF (paroxysmal atrial fibrillation) (CMS/HCC)    • Peripheral neuropathy    • Skin cancer     Left hand   • Sleep apnea    • SSS (sick sinus syndrome) (CMS/HCC)    • Stroke (cerebrum) (CMS/HCC)    • Tricuspid regurgitation     Trace       ONCOLOGIC HISTORY:    [facility] HISTORY  Patient is an 85-year-old female whom I had seen in 2014 when she was hospitalized at [facility] and was diagnosed with chronic lymphocytic leukemia.  She has not been to see me in over 4 years and is following with Dr. Suarez her primary care doctor and her white count is gone up a little higher than usual to 22,000 and she is referred back to us for evaluation.  We are doing a telephone visit because of the coronavirus pandemic and her age and susceptibility.    When I originally saw her in 2014 she was brought into the ER unresponsive in septic shock on 03/25/2014 with methicillin-resistant staphylococcus identified in her blood cultures. The patient has been on antibiotic with improvement, but had some residual kidney damage with a creatinine in the 4-range requiring hemodialysis, and was noted on admission to have an elevated white count with lymphocytosis, normal hemoglobin, but a platelet count of 95,000, and over the course of the illness her platelet count normalized and the white count had gone up into the 20,000 range with lymphocytosis. A flow cytometry was sent which is consistent with CLL, she came to our office once or twice and then stopped coming because she was so stable     She tells me now she was hospitalized recently with rhinovirus infection and has had diagnosed with asthma and is having a lot of respiratory issues but does not require oxygen.She is at the [facility] home in independent living and managing fairly well    She denies fever sweats or weight loss.  Her last white count was on 5/26/2020  Showed a white count of 18,000 with 66 lymphs and 27 neutrophils platelet white  hemoglobin is 12.6 platelet 188,000    I reassured Christopher that no treatment is indicated for this level of leukocytosis from CLL and if she has no systemic complaints I do not feel strongly about doing CAT scans at her age but I would like to physically examine her in the office in a couple of months to make sure there is no obvious adenopathy or hepatosplenomegaly.    She does have recurrent infections and we can check quantitative immunoglobulins to see how low they are as another adjunctive option to prevent recurrent infections if she has hypogammaglobulinemia    She remains on Coumadin for atrial fibrillation      Current Outpatient Medications on File Prior to Visit   Medication Sig Dispense Refill   • acetaminophen (TYLENOL) 325 MG tablet Take 2 tablets by mouth Every 4 (Four) Hours As Needed for Mild Pain .     • acetylcysteine (MUCOMYST) 20 % nebulizer solution Take 2 mL by nebulization 4 (Four) Times a Day.     • albuterol (PROVENTIL) (2.5 MG/3ML) 0.083% nebulizer solution Take 2.5 mg by nebulization Every 4 (Four) Hours.     • albuterol sulfate  (90 Base) MCG/ACT inhaler Inhale 2 puffs Every 4 (Four) Hours As Needed for Wheezing. 1 inhaler 3   • aspirin 81 MG tablet Take 81 mg by mouth Daily.     • Benralizumab (FASENRA SC) Inject  under the skin into the appropriate area as directed. Gets one shot a month, next shot may 13 then one every 8 weeks     • Calcium Carbonate-Vitamin D (CALCIUM 500 + D PO) Take  by mouth 2 (two) times a day.     • carvedilol (COREG) 3.125 MG tablet TAKE ONE TABLET BY MOUTH TWICE A  tablet 1   • cephalexin (KEFLEX) 500 MG capsule Take 500 mg by mouth 3 (Three) Times a Day. 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 She had RX filled at [facility] on 9/29/20 for #270 with refills.     • fluconazole (DIFLUCAN) 150 MG tablet      • fluticasone (FLONASE) 50 MCG/ACT nasal spray 1 spray into the nostril(s) as directed by provider Daily.     • Fluticasone-Umeclidin-Vilant  (Trelegy Ellipta) 100-62.5-25 MCG/INH aerosol powder  Inhale 1 puff Daily. As directed      • furosemide (LASIX) 20 MG tablet Take 20 mg by mouth Every Morning.     • glipizide (GLUCOTROL XL) 2.5 MG 24 hr tablet      • guaiFENesin (MUCINEX) 600 MG 12 hr tablet Take 1 tablet by mouth Every 12 (Twelve) Hours.     • LORazepam (ATIVAN) 0.5 MG tablet      • losartan (COZAAR) 25 MG tablet Take 1 tablet by mouth Daily. 30 tablet 11   • montelukast (SINGULAIR) 10 MG tablet Take 10 mg by mouth Every Night.     • Omega-3 Fatty Acids (FISH OIL) 1000 MG capsule capsule Take 1,000 mg by mouth 2 (Two) Times a Day With Meals.     • oxybutynin XL (DITROPAN-XL) 10 MG 24 hr tablet Take 10 mg by mouth every night at bedtime.     • pantoprazole (PROTONIX) 40 MG EC tablet Take 40 mg by mouth Daily.     • polyethyl glycol-propyl glycol (LUBRICATING EYE DROPS) 0.4-0.3 % solution ophthalmic solution Administer 1 drop to both eyes Every 1 (One) Hour As Needed.     • rosuvastatin (CRESTOR) 20 MG tablet Take 20 mg by mouth Every Night.     • sodium chloride 0.65 % nasal spray 1 spray into the nostril(s) as directed by provider As Needed for Congestion.  12   • Tobramycin 300 MG/4ML nebulizer solution      • warfarin (COUMADIN) 4 MG tablet      • [DISCONTINUED] oxazepam (SERAX) 10 MG capsule Take 1 capsule by mouth Every Night. 5 capsule 0     No current facility-administered medications on file prior to visit.        ALLERGIES:     Allergies   Allergen Reactions   • Accupril [Quinapril Hcl] Swelling, Other (See Comments), GI Intolerance and Delirium     HA, constipation    • Ahist [Chlorpheniramine] Nausea Only, Other (See Comments) and Dizziness     Headache, Blurred vision   • Clarithromycin Nausea Only, Other (See Comments) and Mental Status Change     HA, Depression, Flushing   • Esomeprazole GI Intolerance   • Levalbuterol Swelling   • Levocetirizine Diarrhea and GI Intolerance   • Lipitor [Atorvastatin] Other (See Comments) and Myalgia      Dark urine   • Omeprazole Nausea Only and Other (See Comments)     HA   • Pravachol [Pravastatin] Nausea Only and GI Intolerance     Bloated, Constipation, HA   • Sulindac Other (See Comments) and Myalgia     HA, joint pain, bruising   • Valdecoxib Irritability   • Chlorcyclizine Unknown - Low Severity   • Diclofenac Sodium Unknown - Low Severity   • Diphenhydramine Unknown - Low Severity   • Lodine [Etodolac] Unknown - Low Severity   • Sulfa Antibiotics Unknown - Low Severity       Social History     Socioeconomic History   • Marital status: Single     Spouse name: Not on file   • Number of children: Not on file   • Years of education: Not on file   • Highest education level: Not on file   Occupational History     Employer: RETIRED   Tobacco Use   • Smoking status: Never Smoker   • Smokeless tobacco: Never Used   • Tobacco comment: caffeine use- soda   Substance and Sexual Activity   • Alcohol use: Not Currently   • Drug use: No   • Sexual activity: Defer   Lifestyle   • Physical activity     Days per week: 0 days     Minutes per session: 0 min   • Stress: Only a little         Cancer-related family history includes Colon cancer in her sister.     I have reviewed the patient's medical history in detail and updated the computerized patient record.    Review of Systems   Constitutional: Negative for appetite change, chills, diaphoresis, fatigue, fever and unexpected weight change.   HENT: Negative for mouth sores and sore throat.    Eyes: Negative for visual disturbance.   Respiratory: Positive for shortness of breath (improved). Negative for cough and wheezing.    Gastrointestinal: Negative.  Negative for abdominal pain, diarrhea, nausea and vomiting.   Genitourinary: Negative.  Negative for dysuria and frequency.   Musculoskeletal: Positive for back pain.   Neurological: Negative.  Negative for dizziness and weakness.   Hematological: Does not bruise/bleed easily.   Psychiatric/Behavioral: Negative.  The  patient is not nervous/anxious.    All other systems reviewed and are negative.  I have reviewed and confirmed the accuracy of the ROS as documented by the MA/LPN/RN KRISTIN Koenig      Objective      Vitals:    12/23/20 1048   BP: 111/67   Pulse: 94   Resp: 20   Temp: 97.9 °F (36.6 °C)   SpO2: 95%     Current Status 12/23/2020   ECOG score 2     Pain Score    12/23/20 1048   PainSc: 0-No pain         Physical Exam   Constitutional: She is oriented to person, place, and time. She appears well-developed. No distress.   HENT:   Head: Normocephalic and atraumatic.   Eyes: Conjunctivae are normal.   Neck: Normal range of motion. No JVD present.   Cardiovascular: Normal rate and regular rhythm.   Pulmonary/Chest: Effort normal. No respiratory distress. She has no wheezes.   Abdominal: Soft. Bowel sounds are normal.   Neurological: She is alert and oriented to person, place, and time.   Skin: Skin is warm and dry.   Psychiatric: Her behavior is normal.   Vitals reviewed.   I have reexamined the patient 12/23/2020 and the results are consistent with the previously documented exam. KRISTIN Koenig       RECENT LABS:  Results from last 7 days   Lab Units 12/23/20  1040   WBC 10*3/mm3 8.56   NEUTROS ABS 10*3/mm3 4.43   HEMOGLOBIN g/dL 10.7*   HEMATOCRIT % 35.4   PLATELETS 10*3/mm3 160     Results from last 7 days   Lab Units 12/16/20  1240   SODIUM mmol/L 142   POTASSIUM mmol/L 4.5   CHLORIDE mmol/L 107   CO2 mmol/L 24.4   BUN mg/dL 28*   CREATININE mg/dL 1.19*   CALCIUM mg/dL 8.8   GLUCOSE mg/dL 91     Results from last 7 days   Lab Units 12/22/20   INR  3.90       Thoracic spine radiograph  IMPRESSION:  FINDINGS AND IMPRESSION:  Left-sided pacemaker is incompletely visualized.     Extensive bony demineralization limits evaluation. There is a  compression deformity of the T5 vertebral body, best seen on recent CTA  chest resulting in approximately 20% loss of height which is new since  09/16/2019.  Findings are concerning for an acute fracture given patient  history cervical and however findings remain overall age-indeterminate  and correlation with patient history point tenderness in this area is  recommended with follow-up MRI for more exact characterization of  clinically indicated.     Multilevel moderate to severe degenerative changes are present within  the thoracic and vi  sualized upper lumbar spine, best seen on recent CTA.    09/29/2020  CT ANGIOGRAM CHEST  IMPRESSION:  No evidence of pulmonary thromboembolism. There is a new  infiltrate and atelectasis in the posterior right lower lobe where there  are some secretions opacifying several peripheral bronchioles. No other  acute appearing abnormality is present.        Assessment/Plan    1. Chronic lymphocytic leukemia  -stage 0 since 2014 untreated    2.  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  The patient continues to require IVIG replacement.  IgG is pending today    3.  COPD/asthma /emphysema on home O2-recurrent respiratory infections with RSV and rhinovirus and bacteria    4.  Atrial fibrillation on Coumadin    5.  Hypertension/hypercholesterolemia/type 2 diabetes on treatment    Plan  1.  Continue monthly IV IgG  2.  IgG is pending today  3.  Return in 1 month for CBC, monthly IVIG  4.  MD follow-up with Dr. Larkin in 2 months with continued IVIG  5.  We have discussed with the COVID-19 vaccine and recommended the patient proceed, it would be best 1 to 2 weeks prior to her following her IVIG infusion  6.  No treatment indicated for CLL at this time        Nickie Shen, APRN  12/23/2020

## 2020-12-29 ENCOUNTER — ANTICOAGULATION VISIT (OUTPATIENT)
Dept: PHARMACY | Facility: HOSPITAL | Age: 85
End: 2020-12-29

## 2020-12-29 DIAGNOSIS — I48.0 PAROXYSMAL ATRIAL FIBRILLATION (HCC): ICD-10-CM

## 2020-12-29 LAB — INR PPP: 3.1

## 2020-12-29 NOTE — PROGRESS NOTES
Anticoagulation Clinic Progress Note    Anticoagulation Summary  As of 12/29/2020    INR goal:  2.0-3.0   TTR:  72.8 % (2 y)   INR used for dosing:  3.10 (12/29/2020)   Warfarin maintenance plan:  4 mg every Mon, Wed, Fri; 2 mg all other days   Weekly warfarin total:  20 mg   Plan last modified:  Alexander Valiente [facility] (12/22/2020)   Next INR check:  1/4/2021   Priority:  Maintenance   Target end date:  Indefinite    Indications    Paroxysmal atrial fibrillation (CMS/HCC) [I48.0]             Anticoagulation Episode Summary     INR check location:      Preferred lab:      Send INR reminders to:  [facility] CLINICAL POOL    Comments:  [facility] Home lab beginning 4/22/20      Anticoagulation Care Providers     Provider Role Specialty Phone number    Rommel Veliz MD Referring Cardiology [phone]          INR History:  Anticoagulation Monitoring 12/16/2020 12/22/2020 12/29/2020   INR 1.90 3.90 3.10   INR Date 12/16/2020 12/22/2020 12/29/2020   INR Goal 2.0-3.0 2.0-3.0 2.0-3.0   Trend Up Down Same   Last Week Total 20 mg 22 mg 18 mg   Next Week Total 22 mg 18 mg 20 mg   Sun 2 mg 2 mg 2 mg   Mon 4 mg 4 mg -   Tue - Hold (12/22) 2 mg   Wed 4 mg 4 mg 4 mg   Thu 2 mg 2 mg 2 mg   Fri 4 mg 4 mg 4 mg   Sat 4 mg 2 mg 2 mg   Visit Report - - -   Some recent data might be hidden       Plan:  1. INR is Supratherapeutic today- see above in Anticoagulation Summary.   Provided instructions to Akhil with [facility] to Continue their warfarin regimen- see above in Anticoagulation Summary. Today is her lower dose of 2mg, continue same warfarin dosing regimen.   2. Follow up in 6 days      Pattie Perry [facility]

## 2021-01-04 ENCOUNTER — ANTICOAGULATION VISIT (OUTPATIENT)
Dept: PHARMACY | Facility: HOSPITAL | Age: 86
End: 2021-01-04

## 2021-01-04 DIAGNOSIS — I48.0 PAROXYSMAL ATRIAL FIBRILLATION (HCC): ICD-10-CM

## 2021-01-04 LAB — INR PPP: 1.9

## 2021-01-04 RX ORDER — WARFARIN SODIUM 4 MG/1
TABLET ORAL
Qty: 70 TABLET | Refills: 1 | Status: SHIPPED | OUTPATIENT
Start: 2021-01-04 | End: 2021-06-15 | Stop reason: SDUPTHER

## 2021-01-04 NOTE — PROGRESS NOTES
Anticoagulation Clinic Progress Note    Anticoagulation Summary  As of 2021    INR goal:  2.0-3.0   TTR:  72.9 % (2.1 y)   INR used for dosin.90 (2021)   Warfarin maintenance plan:  4 mg every Mon, Wed, Fri; 2 mg all other days   Weekly warfarin total:  20 mg   No change documented:  Alexander Valiente RPH   Plan last modified:  Alexander Valiente RPH (2020)   Next INR check:  2021   Priority:  Maintenance   Target end date:  Indefinite    Indications    Paroxysmal atrial fibrillation (CMS/HCC) [I48.0]             Anticoagulation Episode Summary     INR check location:      Preferred lab:      Send INR reminders to:  [facility]    Comments:  [facility]      Anticoagulation Care Providers     Provider Role Specialty Phone number    Rommel Veliz MD Referring Cardiology [phone]          INR History:  Anticoagulation Monitoring 2020   INR 3.90 3.10 1.90   INR Date 2020   INR Goal 2.0-3.0 2.0-3.0 2.0-3.0   Trend Down Same Same   Last Week Total 22 mg 18 mg 20 mg   Next Week Total 18 mg 20 mg 20 mg   Sun 2 mg 2 mg 2 mg   Mon 4 mg - 4 mg   Tue Hold () 2 mg 2 mg   Wed 4 mg 4 mg 4 mg   Thu 2 mg 2 mg 2 mg   Fri 4 mg 4 mg 4 mg   Sat 2 mg 2 mg 2 mg   Visit Report - - -   Some recent data might be hidden       Plan:  1. INR is Subtherapeutic today- see above in Anticoagulation Summary.   Provided instructions to Maria Alejandra with [facility] to Continue their warfarin regimen (due for high 4 mg dose today) - see above in Anticoagulation Summary.  2. Follow up in 1 week to ensure INR is not persistently low.       Alexander Valiente RPH

## 2021-01-11 ENCOUNTER — ANTICOAGULATION VISIT (OUTPATIENT)
Dept: PHARMACY | Facility: HOSPITAL | Age: 86
End: 2021-01-11

## 2021-01-11 DIAGNOSIS — I48.0 PAROXYSMAL ATRIAL FIBRILLATION (HCC): ICD-10-CM

## 2021-01-11 LAB — INR PPP: 2.3

## 2021-01-11 NOTE — PROGRESS NOTES
Anticoagulation Clinic Progress Note    Anticoagulation Summary  As of 2021    INR goal:  2.0-3.0   TTR:  72.9 % (2.1 y)   INR used for dosin.30 (2021)   Warfarin maintenance plan:  4 mg every Mon, Wed, Fri; 2 mg all other days   Weekly warfarin total:  20 mg   Plan last modified:  Alexander Valeinte RPH (2020)   Next INR check:  1/15/2021   Priority:  Maintenance   Target end date:  Indefinite    Indications    Paroxysmal atrial fibrillation (CMS/HCC) [I48.0]             Anticoagulation Episode Summary     INR check location:      Preferred lab:      Send INR reminders to:  [facility] CLINICAL POOL    Comments:  [facility] lab beginning 20      Anticoagulation Care Providers     Provider Role Specialty Phone number    Rommel Veliz MD Referring Cardiology [phone]        Pertinent info:  Started fluconazole x 3 days yesterday.     INR History:  Anticoagulation Monitoring 2020   INR 3.10 1.90 2.30   INR Date 2020   INR Goal 2.0-3.0 2.0-3.0 2.0-3.0   Trend Same Same Same   Last Week Total 18 mg 20 mg 20 mg   Next Week Total 20 mg 20 mg 16 mg   Sun 2 mg 2 mg -   Mon - 4 mg 2 mg ()   Tue 2 mg 2 mg 2 mg   Wed 4 mg 4 mg 2 mg ()   Thu 2 mg 2 mg 2 mg   Fri 4 mg 4 mg -   Sat 2 mg 2 mg -   Visit Report - - -   Some recent data might be hidden       Plan:  1. INR is Therapeutic today- see above in Anticoagulation Summary.   Provided instructions to Maria Alejandra with [facility] to Change their warfarin regimen- see above in Anticoagulation Summary.  2. Follow up in 4 days      Alexander Vailente RPH

## 2021-01-13 ENCOUNTER — CLINICAL SUPPORT NO REQUIREMENTS (OUTPATIENT)
Dept: CARDIOLOGY | Facility: CLINIC | Age: 86
End: 2021-01-13

## 2021-01-13 ENCOUNTER — OFFICE VISIT (OUTPATIENT)
Dept: CARDIOLOGY | Facility: CLINIC | Age: 86
End: 2021-01-13

## 2021-01-13 VITALS
DIASTOLIC BLOOD PRESSURE: 62 MMHG | HEIGHT: 63 IN | HEART RATE: 84 BPM | BODY MASS INDEX: 36.86 KG/M2 | WEIGHT: 208 LBS | SYSTOLIC BLOOD PRESSURE: 120 MMHG

## 2021-01-13 DIAGNOSIS — I49.5 SICK SINUS SYNDROME (HCC): Primary | ICD-10-CM

## 2021-01-13 DIAGNOSIS — I25.10 CORONARY ARTERY DISEASE INVOLVING NATIVE CORONARY ARTERY OF NATIVE HEART WITHOUT ANGINA PECTORIS: ICD-10-CM

## 2021-01-13 DIAGNOSIS — I48.0 PAF (PAROXYSMAL ATRIAL FIBRILLATION) (HCC): ICD-10-CM

## 2021-01-13 DIAGNOSIS — E11.59 TYPE 2 DIABETES MELLITUS WITH OTHER CIRCULATORY COMPLICATION, WITHOUT LONG-TERM CURRENT USE OF INSULIN (HCC): ICD-10-CM

## 2021-01-13 DIAGNOSIS — I49.5 SSS (SICK SINUS SYNDROME) (HCC): Primary | ICD-10-CM

## 2021-01-13 DIAGNOSIS — I50.22 CHRONIC SYSTOLIC CHF (CONGESTIVE HEART FAILURE), NYHA CLASS 3 (HCC): ICD-10-CM

## 2021-01-13 PROCEDURE — 99214 OFFICE O/P EST MOD 30 MIN: CPT | Performed by: NURSE PRACTITIONER

## 2021-01-13 PROCEDURE — 93280 PM DEVICE PROGR EVAL DUAL: CPT | Performed by: INTERNAL MEDICINE

## 2021-01-13 PROCEDURE — 93000 ELECTROCARDIOGRAM COMPLETE: CPT | Performed by: NURSE PRACTITIONER

## 2021-01-13 RX ORDER — GLIPIZIDE 5 MG/1
TABLET ORAL
COMMUNITY
Start: 2021-01-05 | End: 2021-03-17

## 2021-01-13 NOTE — PROGRESS NOTES
Date of Office Visit: 21  Encounter Provider: KRISTIN Frey  Place of Service: [facility] CARDIOLOGY  Patient Name: Caitlyn Longoria  :1934    Chief Complaint   Patient presents with   • Atrial fibrillation and flutter   • Coronary artery disease involving native coronary artery of    • Follow-up   :     HPI: Caitlyn Longoria is a 86 y.o. female  with history of hypertension, hyperlipidemia, atrial fibrillation, coronary artery disease with history of stent, CVA, asthma, oropharyngeal dysphagia, chronic systolic and diastolic congestive heart failure, ischemic cardiomyopathy, chronic kidney disease stage III, and diabetes mellitus. She is followed by Dr. Veliz. I will see her in follow up today.      In 2007 it appeared that she had an anterior infarct.  She had an echocardiogram that confirmed this and ultimately had cardiac catheterization on 2008, which confirmed a left ventricular ejection fraction of 35%, total occlusion of the mid left anterior descending, severe disease of the large diagonal, and insignificant disease of the right coronary artery and circumflex.  She had angioplasty and drug-eluting stent placement of the diagonal.  Unfortunately, she developed a right femoral pseudoaneurysm and had to have that surgically repaired.  Follow-up echocardiogram in  showed left ventricular function have returned to normal.     In 2011 she presented with increasing shortness of breath and was found to have some congestive heart failure, atrial flutter, and marked bradycardia.  Echocardiogram at that time showed preserved LV function.  He had a dual-chamber pacemaker implanted.  She underwent atrial flutter ablation in 2011.     In 2012 she presented with dyspnea area she underwent cardiac catheterization which showed elevated right-sided pressures with a PA systolic pressure of 53mmHg, mean 31, and elevated wedge at 26.  She  hadin-stent stenosis of the diagonal vessel.  The total occlusion of the mid LAD was unchanged.  The EF was approximately 50% and she underwent angioplasty and drug-eluting stent to the diagonal vessel, again.  Her medications were adjusted and then she developed dizziness so her Lasix was cut back.  She had a couple episodes of passing out that were attributed to low blood pressure     She then had the RV lead revised in October 2014. In October 2017 she had neuro changes.  CT of the head and neck show no evidence of acute infarction or hemorrhage.  She was noted to have small vessel ischemic disease and age appropriate atrophic she has significant dysarthria which resolved today prior to discharge and was started on Plavix.  She then had a conjunctival hemorrhage and was told to take aspirin 81 mg a remain on Coumadin instead.  Atherosclerotic disease involving the carotid bifurcation was noted bilaterally.  Echocardiogram in December 2017 showedseverely decreased left ventricular systolic function with calculated EF of 23.4%, grade 1 diastolic dysfunction, mild LVH, moderately thickened aortic valve, moderate MAC, and mild mitral valve regurgitation.  She was unable to have a MRI due to her pacer.  In January 2018, she had sepsis of the right knee joint. ID recommended 6 weeks of IV antibiotics and she was discharged with a PICC line.   She then developed lymphedema.     She had repeat echo in 05/2018 which showed left ventricular ejection fraction of 35% - 40%.    She later moved into an apartment community.  In August 2018 she had been taken off Bumex and put on Lasix due to worsening renal function.  In September 2018 she was hospitalized for exacerbation of her lung disease. She continued to have shortness of breath and occasional wheezing secondary to her asthma.  In October 2020 she was admitted with increased dyspnea on exertion.  CT scan of the chest showed atelectasis versus infiltrate in the right lower  lobe.  She was treated aggressively with bronchodilator and mucolytic's.  She was also started on IV antibiotics with Pseudomonas coverage.  She had bronchoscopy which revealed copious amount of secretions which were washed out.  She had a repeat bronchoscopy 3 days later and eventually began to improve.      We visit today in follow-up.  She has been doing pretty well.  She plans to receive the Vaccine on Friday since [email or web address] is providing that for her living community.  She has occasional edema.  She has had a poor appetite recently and her weights have been trending down.  She has a little lightheadedness secondary to her medication but no near syncope or syncope.  That is chronic and unchanged.  She denies palpitation, chest pain tightness pressure or fatigue.  She continues to live at [place] and ambulates with walker. They check her INR and transmit that to our anticoagulation clinic.   She denies any recent falls.           Allergies   Allergen Reactions   • Accupril [Quinapril Hcl] Swelling, Other (See Comments), GI Intolerance and Delirium     HA, constipation    • Ahist [Chlorpheniramine] Nausea Only, Other (See Comments) and Dizziness     Headache, Blurred vision   • Clarithromycin Nausea Only, Other (See Comments) and Mental Status Change     HA, Depression, Flushing   • Esomeprazole GI Intolerance   • Levalbuterol Swelling   • Levocetirizine Diarrhea and GI Intolerance   • Lipitor [Atorvastatin] Other (See Comments) and Myalgia     Dark urine   • Omeprazole Nausea Only and Other (See Comments)     HA   • Pravachol [Pravastatin] Nausea Only and GI Intolerance     Bloated, Constipation, HA   • Sulindac Other (See Comments) and Myalgia     HA, joint pain, bruising   • Valdecoxib Irritability   • Chlorcyclizine Unknown - Low Severity   • Diclofenac Sodium Unknown - Low Severity   • Diphenhydramine Unknown - Low Severity   • Lodine [Etodolac] Unknown - Low Severity   • Sulfa Antibiotics Unknown - Low  "Severity           Family and social history reviewed.     Review of Systems   Cardiovascular: Positive for dyspnea on exertion and leg swelling.   Neurological: Positive for light-headedness.     All other systems were reviewed and are negative          Objective:     Vitals:    01/13/21 1004   BP: 120/62   BP Location: Right arm   Patient Position: Sitting   Pulse: 84   Weight: 94.3 kg (208 lb)   Height: 160 cm (63\")     Body mass index is 36.85 kg/m².    PHYSICAL EXAM:  Constitutional:       General: Not in acute distress.     Appearance: Well-developed. Not diaphoretic.   HENT:      Head: Normocephalic.   Pulmonary:      Effort: Pulmonary effort is normal. No respiratory distress.      Breath sounds: Normal breath sounds. No wheezing. No rhonchi. No rales.      Comments: Oxygen/nasal cannula  Cardiovascular:      Normal rate. Regular rhythm.   Pulses:     Radial: 2+ bilaterally.  Edema:     Ankle: bilateral trace edema of the ankle.  Skin:     General: Skin is warm and dry. There is no cyanosis.      Findings: No rash.   Neurological:      Mental Status: Alert and oriented to person, place, and time.   Psychiatric:         Behavior: Behavior normal.         Thought Content: Thought content normal.         Judgment: Judgment normal.           ECG 12 Lead    Date/Time: 1/13/2021 10:32 AM  Performed by: Darling Rodney APRN  Authorized by: Darling Rodney APRN   Comparison: compared with previous ECG   Rhythm: atrial fibrillation and paced  Rate: normal  Pacing: ventricular paced rhythm  Clinical impression: abnormal EKG              Current Outpatient Medications   Medication Sig Dispense Refill   • acetaminophen (TYLENOL) 325 MG tablet Take 2 tablets by mouth Every 4 (Four) Hours As Needed for Mild Pain .     • acetylcysteine (MUCOMYST) 20 % nebulizer solution Take 2 mL by nebulization 4 (Four) Times a Day.     • albuterol (PROVENTIL) (2.5 MG/3ML) 0.083% nebulizer solution Take 2.5 mg by nebulization Every 4 (Four) " Hours.     • albuterol sulfate  (90 Base) MCG/ACT inhaler Inhale 2 puffs Every 4 (Four) Hours As Needed for Wheezing. 1 inhaler 3   • aspirin 81 MG tablet Take 81 mg by mouth Daily.     • Benralizumab (FASENRA SC) Inject  under the skin into the appropriate area as directed. Gets one shot a month, next shot may 13 then one every 8 weeks     • Calcium Carbonate-Vitamin D (CALCIUM 500 + D PO) Take  by mouth 2 (two) times a day.     • carvedilol (COREG) 3.125 MG tablet TAKE ONE TABLET BY MOUTH TWICE A  tablet 1   • cephalexin (KEFLEX) 500 MG capsule Take 500 mg by mouth 3 (Three) Times a Day. 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 She had RX filled at [facility] on 9/29/20 for #270 with refills.     • fluticasone (FLONASE) 50 MCG/ACT nasal spray 1 spray into the nostril(s) as directed by provider Daily.     • Fluticasone-Umeclidin-Vilant (Trelegy Ellipta) 100-62.5-25 MCG/INH aerosol powder  Inhale 1 puff Daily. As directed      • furosemide (LASIX) 20 MG tablet Take 20 mg by mouth Every Morning.     • glipizide (GLUCOTROL XL) 2.5 MG 24 hr tablet      • glipizide (GLUCOTROL) 5 MG tablet      • guaiFENesin (MUCINEX) 600 MG 12 hr tablet Take 1 tablet by mouth Every 12 (Twelve) Hours.     • Loperamide HCl (IMODIUM PO) Take  by mouth Daily.     • LORazepam (ATIVAN) 0.5 MG tablet      • losartan (COZAAR) 25 MG tablet Take 1 tablet by mouth Daily. 30 tablet 11   • montelukast (SINGULAIR) 10 MG tablet Take 10 mg by mouth Every Night.     • Omega-3 Fatty Acids (FISH OIL) 1000 MG capsule capsule Take 1,000 mg by mouth 2 (Two) Times a Day With Meals.     • oxybutynin XL (DITROPAN-XL) 10 MG 24 hr tablet Take 10 mg by mouth every night at bedtime.     • pantoprazole (PROTONIX) 40 MG EC tablet Take 40 mg by mouth Daily.     • polyethyl glycol-propyl glycol (LUBRICATING EYE DROPS) 0.4-0.3 % solution ophthalmic solution Administer 1 drop to both eyes Every 1 (One) Hour As Needed.     • rosuvastatin (CRESTOR) 20 MG  tablet Take 20 mg by mouth Every Night.     • sodium chloride 0.65 % nasal spray 1 spray into the nostril(s) as directed by provider As Needed for Congestion.  12   • Tobramycin 300 MG/4ML nebulizer solution      • warfarin (COUMADIN) 4 MG tablet TAKE ONE TABLET BY MOUTH ON MON, WED AND FRI. TAKE ONE-HALF TABLET ON ALL OTHER DAYS Or as directed by [facility] 70 tablet 1     No current facility-administered medications for this visit.      Assessment:       Diagnosis Plan   1. Sick sinus syndrome (CMS/[facility])  ECG 12 Lead   2. PAF (paroxysmal atrial fibrillation) (CMS/[facility])  ECG 12 Lead   3. Chronic systolic CHF (congestive heart failure), NYHA class 3 (CMS/[facility])     4. Coronary artery disease involving native coronary artery of native heart without angina pectoris     5. Type 2 diabetes mellitus with other circulatory complication, without long-term current use of insulin (CMS/[facility])          Orders Placed This Encounter   Procedures   • ECG 12 Lead     This order was created via procedure documentation         Plan:        1.86 year old female with history of coronary disease involving the mid left anterior descending with total occlusion of that.  First diagonal vessel with severe disease status post angioplasty and drug-eluting stent placement.  Follow-up In October 2012 showed restenosis of the diagonal vessel and repeat angioplasty and drug-eluting stent placement was completed.  EF was approximately 50% with segmental wall motion abnormality.  Echocardiogram in December 2017 was 23.4% she was started on carvedilol and losartan.  Echocardiogram May 2018 showed improvement to 35-40%.    - no angina  2.  Atrial fibrillation/sick sinus syndrome status post permanent pacemaker implantation status post flutter ablation status post RV lead revision in 2014 then generator change 02/2020.  follows in our device clinic. No bleeding issues on warfarin followed by our anticoagulation clinic  3.  History of CVA in  December 2000 likely embolic from atrial fibrillation she was intolerant to Plavix. She continues on ASA  4.  Diabetes mellitus hemoglobin A1c goal 7.0 or less  5.  Hyperlipidemia on target dose rosuvastatin 20 mg  6.  Obstructive sleep apnea reports compliance on CPAP followed by Dr. Coburn  7.  History of pulmonary hypertension   8.  Obesity BMI  36.85  9. History of syncope secondary to hypotension no recurrence  10.  Ischemic cardiomyopathy left ventricular ejection fraction improved from 23 %to 35-40% in 05/2018.   Weights +++  It is difficult for her to avoid added salt because she does not prepare her meals at the living facility.  She makes attempt to eat smaller portion sizes.  11.  Chronic kidney disease on lasix 20 mg and volume status now stable. Switched off Bumex in 2018 for worsening renal function.  Plan for repeat BMP next week when she has repeat INR  12.  Bilateral carotid plaque without stenosis on duplex and 12/2017  13. 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  Oropharyngeal dysphasia mild on video swallow August 2019 unchanged from 2016.   15.  Status post bronchoscopy times 2 October 2020 with copious lavage this greatly improved her symptoms she follows with pulmonary      I am going to transition her care to Dr. Jonah Regalado. She will see him in 6 months. Call with questions or concerns.             It has been a pleasure to participate in this patient's care.      Thank you,  KRISTIN Frey      **I used Dragon to dictate this note:**

## 2021-01-15 ENCOUNTER — ANTICOAGULATION VISIT (OUTPATIENT)
Dept: PHARMACY | Facility: HOSPITAL | Age: 86
End: 2021-01-15

## 2021-01-15 DIAGNOSIS — I48.0 PAROXYSMAL ATRIAL FIBRILLATION (HCC): ICD-10-CM

## 2021-01-15 LAB — INR PPP: 2.2

## 2021-01-15 NOTE — PROGRESS NOTES
Anticoagulation Clinic Progress Note    Anticoagulation Summary  As of 1/15/2021    INR goal:  2.0-3.0   TTR:  73.1 % (2.1 y)   INR used for dosin.20 (1/15/2021)   Warfarin maintenance plan:  4 mg every Mon, Wed, Fri; 2 mg all other days   Weekly warfarin total:  20 mg   No change documented:  Alexander Valiente RPH   Plan last modified:  Alexander Valiente RPH (2020)   Next INR check:  2021   Priority:  Maintenance   Target end date:  Indefinite    Indications    Paroxysmal atrial fibrillation (CMS/HCC) [I48.0]             Anticoagulation Episode Summary     INR check location:      Preferred lab:      Send INR reminders to:  [facility]    Comments:  [facility] Home lab beginning 20      Anticoagulation Care Providers     Provider Role Specialty Phone number    Rommel Veliz MD Referring Cardiology [phone]        Pertinent info:  Finished fluconazole 3-day course on 21. Pt reports she has UTI and will likely be prescribed in next few days.     INR History:  Anticoagulation Monitoring 2021 2021 1/15/2021   INR 1.90 2.30 2.20   INR Date 2021 2021 1/15/2021   INR Goal 2.0-3.0 2.0-3.0 2.0-3.0   Trend Same Same Same   Last Week Total 20 mg 20 mg 16 mg   Next Week Total 20 mg 16 mg 20 mg   Sun 2 mg - 2 mg   Mon 4 mg 2 mg () -   Tue 2 mg 2 mg -   Wed 4 mg 2 mg () -   Thu 2 mg 2 mg -   Fri 4 mg - 4 mg   Sat 2 mg - 2 mg   Visit Report - - -   Some recent data might be hidden       Plan:  1. INR is Therapeutic today- see above in Anticoagulation Summary.   Provided instructions via secure VM to Tricia with [facility] to resume their usual warfarin regimen - see above in Anticoagulation Summary. Also spoke directly with patient and gave her instructions by phone.   2. Follow up in 3 days      Alexander Valiente RPH

## 2021-01-18 ENCOUNTER — ANTICOAGULATION VISIT (OUTPATIENT)
Dept: PHARMACY | Facility: HOSPITAL | Age: 86
End: 2021-01-18

## 2021-01-18 DIAGNOSIS — I48.0 PAROXYSMAL ATRIAL FIBRILLATION (HCC): ICD-10-CM

## 2021-01-18 LAB — INR PPP: 2.8

## 2021-01-18 NOTE — PROGRESS NOTES
Anticoagulation Clinic Progress Note    Anticoagulation Summary  As of 2021    INR goal:  2.0-3.0   TTR:  73.2 % (2.1 y)   INR used for dosin.80 (2021)   Warfarin maintenance plan:  4 mg every Mon, Wed, Fri; 2 mg all other days   Weekly warfarin total:  20 mg   Plan last modified:  Alexander Valiente RPH (2020)   Next INR check:  2021   Priority:  Maintenance   Target end date:  Indefinite    Indications    Paroxysmal atrial fibrillation (CMS/HCC) [I48.0]             Anticoagulation Episode Summary     INR check location:      Preferred lab:      Send INR reminders to:  [facility] CLINICAL POOL    Comments:  [facility] Home lab beginning 20      Anticoagulation Care Providers     Provider Role Specialty Phone number    Jonah Regalado Jr., MD Referring Cardiology [phone]          INR History:  Anticoagulation Monitoring 2021 1/15/2021 2021   INR 2.30 2.20 2.80   INR Date 2021 1/15/2021 2021   INR Goal 2.0-3.0 2.0-3.0 2.0-3.0   Trend Same Same Same   Last Week Total 20 mg 16 mg 16 mg   Next Week Total 16 mg 20 mg 18 mg   Sun - 2 mg 2 mg   Mon 2 mg () - 4 mg   Tue 2 mg - 2 mg   Wed 2 mg () - 2 mg ()   Thu 2 mg - 2 mg   Fri - 4 mg 4 mg   Sat - 2 mg 2 mg   Visit Report - - -   Some recent data might be hidden       Plan:  1. INR is Therapeutic today- see above in Anticoagulation Summary.   Provided instructions to Maria Alejandra with [facility] to Change their warfarin regimen- see above in Anticoagulation Summary.  2. Follow up in 1 week      Pao Levi RPH

## 2021-01-20 ENCOUNTER — INFUSION (OUTPATIENT)
Dept: ONCOLOGY | Facility: HOSPITAL | Age: 86
End: 2021-01-20

## 2021-01-20 VITALS
RESPIRATION RATE: 18 BRPM | DIASTOLIC BLOOD PRESSURE: 72 MMHG | SYSTOLIC BLOOD PRESSURE: 107 MMHG | BODY MASS INDEX: 37.2 KG/M2 | OXYGEN SATURATION: 97 % | HEART RATE: 76 BPM | TEMPERATURE: 96.9 F | WEIGHT: 210 LBS

## 2021-01-20 DIAGNOSIS — C91.12 CLL (CHRONIC LYMPHOID LEUKEMIA) IN RELAPSE (HCC): Primary | ICD-10-CM

## 2021-01-20 LAB
BASOPHILS # BLD AUTO: 0 10*3/MM3 (ref 0–0.2)
BASOPHILS NFR BLD AUTO: 0 % (ref 0–1.5)
DEPRECATED RDW RBC AUTO: 51.8 FL (ref 37–54)
EOSINOPHIL # BLD AUTO: 0 10*3/MM3 (ref 0–0.4)
EOSINOPHIL NFR BLD AUTO: 0 % (ref 0.3–6.2)
ERYTHROCYTE [DISTWIDTH] IN BLOOD BY AUTOMATED COUNT: 15 % (ref 12.3–15.4)
HCT VFR BLD AUTO: 36.5 % (ref 34–46.6)
HGB BLD-MCNC: 11.1 G/DL (ref 12–15.9)
IGA1 MFR SER: 16 MG/DL (ref 70–400)
IGG1 SER-MCNC: 772 MG/DL (ref 700–1600)
IGM SERPL-MCNC: 25 MG/DL (ref 40–230)
IMM GRANULOCYTES # BLD AUTO: 0.04 10*3/MM3 (ref 0–0.05)
IMM GRANULOCYTES NFR BLD AUTO: 0.5 % (ref 0–0.5)
LYMPHOCYTES # BLD AUTO: 3.72 10*3/MM3 (ref 0.7–3.1)
LYMPHOCYTES NFR BLD AUTO: 48.1 % (ref 19.6–45.3)
MCH RBC QN AUTO: 28.5 PG (ref 26.6–33)
MCHC RBC AUTO-ENTMCNC: 30.4 G/DL (ref 31.5–35.7)
MCV RBC AUTO: 93.8 FL (ref 79–97)
MONOCYTES # BLD AUTO: 0.3 10*3/MM3 (ref 0.1–0.9)
MONOCYTES NFR BLD AUTO: 3.9 % (ref 5–12)
NEUTROPHILS NFR BLD AUTO: 3.68 10*3/MM3 (ref 1.7–7)
NEUTROPHILS NFR BLD AUTO: 47.5 % (ref 42.7–76)
NRBC BLD AUTO-RTO: 0 /100 WBC (ref 0–0.2)
PLATELET # BLD AUTO: 174 10*3/MM3 (ref 140–450)
PMV BLD AUTO: 10 FL (ref 6–12)
RBC # BLD AUTO: 3.89 10*6/MM3 (ref 3.77–5.28)
WBC # BLD AUTO: 7.74 10*3/MM3 (ref 3.4–10.8)

## 2021-01-20 PROCEDURE — 96366 THER/PROPH/DIAG IV INF ADDON: CPT

## 2021-01-20 PROCEDURE — 25010000002 IMMUNE GLOBULIN (HUMAN) 30 GM/300ML SOLUTION: Performed by: INTERNAL MEDICINE

## 2021-01-20 PROCEDURE — 85025 COMPLETE CBC W/AUTO DIFF WBC: CPT

## 2021-01-20 PROCEDURE — 82784 ASSAY IGA/IGD/IGG/IGM EACH: CPT | Performed by: INTERNAL MEDICINE

## 2021-01-20 PROCEDURE — 96365 THER/PROPH/DIAG IV INF INIT: CPT

## 2021-01-20 RX ORDER — FAMOTIDINE 10 MG/ML
20 INJECTION, SOLUTION INTRAVENOUS AS NEEDED
Status: CANCELLED | OUTPATIENT
Start: 2021-01-20

## 2021-01-20 RX ORDER — ACETAMINOPHEN 325 MG/1
650 TABLET ORAL ONCE
Status: COMPLETED | OUTPATIENT
Start: 2021-01-20 | End: 2021-01-20

## 2021-01-20 RX ORDER — HYDROXYZINE PAMOATE 25 MG/1
25 CAPSULE ORAL ONCE
Status: COMPLETED | OUTPATIENT
Start: 2021-01-20 | End: 2021-01-20

## 2021-01-20 RX ORDER — SODIUM CHLORIDE 9 MG/ML
250 INJECTION, SOLUTION INTRAVENOUS ONCE
Status: COMPLETED | OUTPATIENT
Start: 2021-01-20 | End: 2021-01-20

## 2021-01-20 RX ORDER — DIPHENHYDRAMINE HYDROCHLORIDE 50 MG/ML
50 INJECTION INTRAMUSCULAR; INTRAVENOUS AS NEEDED
Status: CANCELLED | OUTPATIENT
Start: 2021-01-20

## 2021-01-20 RX ORDER — MEPERIDINE HYDROCHLORIDE 50 MG/ML
25 INJECTION INTRAMUSCULAR; INTRAVENOUS; SUBCUTANEOUS
Status: CANCELLED | OUTPATIENT
Start: 2021-01-20

## 2021-01-20 RX ADMIN — IMMUNE GLOBULIN INFUSION (HUMAN) 30 G: 100 INJECTION, SOLUTION INTRAVENOUS; SUBCUTANEOUS at 11:18

## 2021-01-20 RX ADMIN — ACETAMINOPHEN 650 MG: 325 TABLET ORAL at 10:54

## 2021-01-20 RX ADMIN — HYDROXYZINE PAMOATE 25 MG: 25 CAPSULE ORAL at 10:54

## 2021-01-20 RX ADMIN — SODIUM CHLORIDE 250 ML: 9 INJECTION, SOLUTION INTRAVENOUS at 11:18

## 2021-01-25 ENCOUNTER — ANTICOAGULATION VISIT (OUTPATIENT)
Dept: PHARMACY | Facility: HOSPITAL | Age: 86
End: 2021-01-25

## 2021-01-25 DIAGNOSIS — I48.0 PAROXYSMAL ATRIAL FIBRILLATION (HCC): ICD-10-CM

## 2021-01-25 LAB — INR PPP: 2.5

## 2021-01-25 NOTE — PROGRESS NOTES
Anticoagulation Clinic Progress Note    Anticoagulation Summary  As of 2021    INR goal:  2.0-3.0   TTR:  73.4 % (2.1 y)   INR used for dosin.50 (2021)   Warfarin maintenance plan:  4 mg every Mon, Fri; 2 mg all other days   Weekly warfarin total:  18 mg   Plan last modified:  Pao Levi RPH (2021)   Next INR check:  2021   Priority:  Maintenance   Target end date:  Indefinite    Indications    Paroxysmal atrial fibrillation (CMS/HCC) [I48.0]             Anticoagulation Episode Summary     INR check location:      Preferred lab:      Send INR reminders to:  [facility] CLINICAL POOL    Comments:  [facility] Home lab beginning 20      Anticoagulation Care Providers     Provider Role Specialty Phone number    Jonah Regalado Jr., MD Referring Cardiology [phone]          INR History:  Anticoagulation Monitoring 1/15/2021 2021 2021   INR 2.20 2.80 2.50   INR Date 1/15/2021 2021 2021   INR Goal 2.0-3.0 2.0-3.0 2.0-3.0   Trend Same Same Down   Last Week Total 16 mg 16 mg 18 mg   Next Week Total 20 mg 18 mg 18 mg   Sun 2 mg 2 mg 2 mg   Mon - 4 mg 4 mg   Tue - 2 mg 2 mg   Wed - 2 mg () 2 mg   Thu - 2 mg 2 mg   Fri 4 mg 4 mg 4 mg   Sat 2 mg 2 mg 2 mg   Visit Report - - -   Some recent data might be hidden       Plan:  1. INR is Therapeutic today- see above in Anticoagulation Summary.   Provided instructions to Tricia with [facility] to Change their  Usual warfarin regimen to match what patient had this past week- see above in Anticoagulation Summary.  2. Follow up in 2 weeks  3. Spoke with pt today and confirmed dosing this past week and continued that dose.      Pao Levi RPH

## 2021-02-08 ENCOUNTER — ANTICOAGULATION VISIT (OUTPATIENT)
Dept: PHARMACY | Facility: HOSPITAL | Age: 86
End: 2021-02-08

## 2021-02-08 DIAGNOSIS — I48.0 PAROXYSMAL ATRIAL FIBRILLATION (HCC): ICD-10-CM

## 2021-02-08 LAB — INR PPP: 1.6

## 2021-02-08 NOTE — PROGRESS NOTES
Anticoagulation Clinic Progress Note    Anticoagulation Summary  As of 2021    INR goal:  2.0-3.0   TTR:  73.1 % (2.2 y)   INR used for dosin.60 (2021)   Warfarin maintenance plan:  4 mg every Mon, Fri; 2 mg all other days   Weekly warfarin total:  18 mg   Plan last modified:  Pao Levi RPH (2021)   Next INR check:  2021   Priority:  Maintenance   Target end date:  Indefinite    Indications    Paroxysmal atrial fibrillation (CMS/HCC) [I48.0]             Anticoagulation Episode Summary     INR check location:      Preferred lab:      Send INR reminders to:  [facility] CLINICAL POOL    Comments:  [facility] lab beginning 20      Anticoagulation Care Providers     Provider Role Specialty Phone number    Jonah Regalado Jr., MD Referring Cardiology [phone]          Clinic Interview:  Patient Findings     Positives:  Change in medications, Other complaints    Negatives:  Signs/symptoms of thrombosis, Signs/symptoms of bleeding,   Laboratory test error suspected, Change in health, Change in alcohol use,   Change in activity, Upcoming invasive procedure, Emergency department   visit, Upcoming dental procedure, Missed doses, Extra doses, Change in   diet/appetite, Hospital admission, Bruising    Comments:  Discharged from [facility] today. As noted, 21, pt started   cephalexin for UTI prophylaxis.      Clinical Outcomes     Negatives:  Major bleeding event, Thromboembolic event,   Anticoagulation-related hospital admission, Anticoagulation-related ED   visit, Anticoagulation-related fatality    Comments:  Discharged from [facility] today. As noted, 21, pt started   cephalexin for UTI prophylaxis.        INR History:  Anticoagulation Monitoring 2021   INR 2.80 2.50 1.60   INR Date 2021   INR Goal 2.0-3.0 2.0-3.0 2.0-3.0   Trend Same Down Same   Last Week Total 16 mg 18 mg 18 mg   Next Week Total 18 mg 18 mg 20 mg   Sun 2 mg 2 mg 2 mg   Mon 4  mg 4 mg 4 mg   Tue 2 mg 2 mg 4 mg (2/9); Otherwise 2 mg   Wed 2 mg (1/20) 2 mg 2 mg   Thu 2 mg 2 mg 2 mg   Fri 4 mg 4 mg 4 mg   Sat 2 mg 2 mg 2 mg   Visit Report - - -   Some recent data might be hidden       Plan:  1. INR is Subtherapeutic today- see above in Anticoagulation Summary.   Will instruct Caitlyn GARRETT Longoria to Change their warfarin regimen- see above in Anticoagulation Summary.  2. Follow up in 2 weeks at [facility]  3. They have been instructed to call if any changes in medications, doses, concerns, etc. Patient expresses understanding and has no further questions at this time.    Alexander Valiente RP

## 2021-02-17 ENCOUNTER — APPOINTMENT (OUTPATIENT)
Dept: ONCOLOGY | Facility: HOSPITAL | Age: 86
End: 2021-02-17

## 2021-02-17 ENCOUNTER — APPOINTMENT (OUTPATIENT)
Dept: LAB | Facility: HOSPITAL | Age: 86
End: 2021-02-17

## 2021-02-22 ENCOUNTER — ANTICOAGULATION VISIT (OUTPATIENT)
Dept: PHARMACY | Facility: HOSPITAL | Age: 86
End: 2021-02-22

## 2021-02-22 DIAGNOSIS — I48.0 PAROXYSMAL ATRIAL FIBRILLATION (HCC): ICD-10-CM

## 2021-02-22 LAB — INR PPP: 1.5

## 2021-02-22 RX ORDER — TRIMETHOPRIM 100 MG/1
100 TABLET ORAL DAILY
COMMUNITY
End: 2021-12-07

## 2021-02-22 NOTE — PROGRESS NOTES
Anticoagulation Clinic Progress Note    Anticoagulation Summary  As of 2021    INR goal:  2.0-3.0   TTR:  71.8 % (2.2 y)   INR used for dosin.50 (2021)   Warfarin maintenance plan:  4 mg every Mon, Wed, Fri; 2 mg all other days   Weekly warfarin total:  20 mg   Plan last modified:  Alexander Valiente RPH (2021)   Next INR check:  3/1/2021   Priority:  Maintenance   Target end date:  Indefinite    Indications    Paroxysmal atrial fibrillation (CMS/HCC) [I48.0]             Anticoagulation Episode Summary     INR check location:      Preferred lab:      Send INR reminders to:   [facility] CLINICAL POOL    Comments:  [facility] lab beginning 20      Anticoagulation Care Providers     Provider Role Specialty Phone number    Jonah Regalado Jr., MD Referring Cardiology [phone]          Clinic Interview:  Patient Findings     Positives:  Change in medications    Negatives:  Signs/symptoms of thrombosis, Signs/symptoms of bleeding,   Laboratory test error suspected, Change in health, Change in alcohol use,   Change in activity, Upcoming invasive procedure, Emergency department   visit, Upcoming dental procedure, Missed doses, Extra doses, Change in   diet/appetite, Hospital admission, Bruising, Other complaints    Comments:  Reports cephalexin was discontinue; started trimethoprim 100   mg daily x ~4 mos for UTI prophylaxis. Also newly started methocarbamol   500 mg TID x 10 days (day 4).      Clinical Outcomes     Negatives:  Major bleeding event, Thromboembolic event,   Anticoagulation-related hospital admission, Anticoagulation-related ED   visit, Anticoagulation-related fatality    Comments:  Reports cephalexin was discontinue; started trimethoprim 100   mg daily x ~4 mos for UTI prophylaxis. Also newly started methocarbamol   500 mg TID x 10 days (day 4).        INR History:  Anticoagulation Monitoring 2021   INR 2.50 1.60 1.50   INR Date 2021  2/22/2021   INR Goal 2.0-3.0 2.0-3.0 2.0-3.0   Trend Down Same Up   Last Week Total 18 mg 18 mg 18 mg   Next Week Total 18 mg 20 mg 20 mg   Sun 2 mg 2 mg 2 mg   Mon 4 mg 4 mg 4 mg   Tue 2 mg 4 mg (2/9); Otherwise 2 mg 2 mg   Wed 2 mg 2 mg 4 mg   Thu 2 mg 2 mg 2 mg   Fri 4 mg 4 mg 4 mg   Sat 2 mg 2 mg 2 mg   Visit Report - - -   Some recent data might be hidden       Plan:  1. INR is Subtherapeutic today- see above in Anticoagulation Summary.   Will instruct Caitlyn TUCKER Rasabina to Increase their warfarin regimen- see above in Anticoagulation Summary.  2. Follow up in 1 week  3. They have been instructed to call if any changes in medications, doses, concerns, etc. Patient expresses understanding and has no further questions at this time.    Alexander Valiente [facility]

## 2021-02-25 PROCEDURE — 93296 REM INTERROG EVL PM/IDS: CPT | Performed by: INTERNAL MEDICINE

## 2021-02-25 PROCEDURE — 93294 REM INTERROG EVL PM/LDLS PM: CPT | Performed by: INTERNAL MEDICINE

## 2021-03-01 ENCOUNTER — ANTICOAGULATION VISIT (OUTPATIENT)
Dept: PHARMACY | Facility: HOSPITAL | Age: 86
End: 2021-03-01

## 2021-03-01 DIAGNOSIS — I48.0 PAROXYSMAL ATRIAL FIBRILLATION (HCC): ICD-10-CM

## 2021-03-01 LAB — INR PPP: 1.5

## 2021-03-01 NOTE — PROGRESS NOTES
Anticoagulation Clinic Progress Note    Anticoagulation Summary  As of 3/1/2021    INR goal:  2.0-3.0   TTR:  71.2 % (2.2 y)   INR used for dosin.50 (3/1/2021)   Warfarin maintenance plan:  4 mg every Mon, Wed, Fri; 2 mg all other days   Weekly warfarin total:  20 mg   Plan last modified:  Alexander Valiente [facility] (2021)   Next INR check:  3/9/2021   Priority:  Maintenance   Target end date:  Indefinite    Indications    Paroxysmal atrial fibrillation (CMS/HCC) [I48.0]             Anticoagulation Episode Summary     INR check location:      Preferred lab:      Send INR reminders to:   [facility] CLINICAL POOL    Comments:  [facility] lab beginning 20      Anticoagulation Care Providers     Provider Role Specialty Phone number    Jonah Regalado Jr., MD Referring Cardiology [phone]          Clinic Interview:      INR History:  Anticoagulation Monitoring 2021 2021 3/1/2021   INR 1.60 1.50 1.50   INR Date 2021 2021 3/1/2021   INR Goal 2.0-3.0 2.0-3.0 2.0-3.0   Trend Same Up Same   Last Week Total 18 mg 18 mg 20 mg   Next Week Total 20 mg 20 mg 22 mg   Sun 2 mg 2 mg 2 mg   Mon 4 mg 4 mg 6 mg (3/1); Otherwise 4 mg   Tue 4 mg (); Otherwise 2 mg 2 mg 2 mg   Wed 2 mg 4 mg 4 mg   Thu 2 mg 2 mg 2 mg   Fri 4 mg 4 mg 4 mg   Sat 2 mg 2 mg 2 mg   Visit Report - - -   Some recent data might be hidden       Plan:  1. INR is Subtherapeutic today- see above in Anticoagulation Summary.   Will instruct Caitlyn Longoria to boost warfarin today, then continue their warfarin regimen- see above in Anticoagulation Summary.  2. Follow up in 1 week  3. Spoke with pt today. They have been instructed to call if any changes in medications, doses, concerns, etc. Patient expresses understanding and has no further questions at this time.    Pao Levi [facility]

## 2021-03-02 ENCOUNTER — OFFICE VISIT (OUTPATIENT)
Dept: ONCOLOGY | Facility: CLINIC | Age: 86
End: 2021-03-02

## 2021-03-02 ENCOUNTER — INFUSION (OUTPATIENT)
Dept: ONCOLOGY | Facility: HOSPITAL | Age: 86
End: 2021-03-02

## 2021-03-02 ENCOUNTER — LAB (OUTPATIENT)
Dept: LAB | Facility: HOSPITAL | Age: 86
End: 2021-03-02

## 2021-03-02 VITALS
OXYGEN SATURATION: 99 % | TEMPERATURE: 96.9 F | HEIGHT: 62 IN | WEIGHT: 210.1 LBS | RESPIRATION RATE: 18 BRPM | SYSTOLIC BLOOD PRESSURE: 105 MMHG | DIASTOLIC BLOOD PRESSURE: 61 MMHG | HEART RATE: 93 BPM | BODY MASS INDEX: 38.66 KG/M2

## 2021-03-02 VITALS — SYSTOLIC BLOOD PRESSURE: 106 MMHG | HEART RATE: 80 BPM | DIASTOLIC BLOOD PRESSURE: 73 MMHG

## 2021-03-02 DIAGNOSIS — D80.1 HYPOGAMMAGLOBULINEMIA (HCC): ICD-10-CM

## 2021-03-02 DIAGNOSIS — C91.12 CLL (CHRONIC LYMPHOID LEUKEMIA) IN RELAPSE (HCC): Primary | ICD-10-CM

## 2021-03-02 DIAGNOSIS — C91.10 CLL (CHRONIC LYMPHOCYTIC LEUKEMIA) (HCC): Primary | ICD-10-CM

## 2021-03-02 DIAGNOSIS — C91.12 CLL (CHRONIC LYMPHOID LEUKEMIA) IN RELAPSE (HCC): ICD-10-CM

## 2021-03-02 LAB
BASOPHILS # BLD AUTO: 0.01 10*3/MM3 (ref 0–0.2)
BASOPHILS NFR BLD AUTO: 0.1 % (ref 0–1.5)
DEPRECATED RDW RBC AUTO: 52.5 FL (ref 37–54)
EOSINOPHIL # BLD AUTO: 0 10*3/MM3 (ref 0–0.4)
EOSINOPHIL NFR BLD AUTO: 0 % (ref 0.3–6.2)
ERYTHROCYTE [DISTWIDTH] IN BLOOD BY AUTOMATED COUNT: 15.5 % (ref 12.3–15.4)
HCT VFR BLD AUTO: 36.8 % (ref 34–46.6)
HGB BLD-MCNC: 11.4 G/DL (ref 12–15.9)
IGA1 MFR SER: 15 MG/DL (ref 70–400)
IGG1 SER-MCNC: 714 MG/DL (ref 700–1600)
IGM SERPL-MCNC: 30 MG/DL (ref 40–230)
IMM GRANULOCYTES # BLD AUTO: 0.03 10*3/MM3 (ref 0–0.05)
IMM GRANULOCYTES NFR BLD AUTO: 0.3 % (ref 0–0.5)
LYMPHOCYTES # BLD AUTO: 4.5 10*3/MM3 (ref 0.7–3.1)
LYMPHOCYTES NFR BLD AUTO: 51.1 % (ref 19.6–45.3)
MCH RBC QN AUTO: 28.4 PG (ref 26.6–33)
MCHC RBC AUTO-ENTMCNC: 31 G/DL (ref 31.5–35.7)
MCV RBC AUTO: 91.5 FL (ref 79–97)
MONOCYTES # BLD AUTO: 0.31 10*3/MM3 (ref 0.1–0.9)
MONOCYTES NFR BLD AUTO: 3.5 % (ref 5–12)
NEUTROPHILS NFR BLD AUTO: 3.95 10*3/MM3 (ref 1.7–7)
NEUTROPHILS NFR BLD AUTO: 45 % (ref 42.7–76)
NRBC BLD AUTO-RTO: 0 /100 WBC (ref 0–0.2)
PLATELET # BLD AUTO: 175 10*3/MM3 (ref 140–450)
PMV BLD AUTO: 10.1 FL (ref 6–12)
RBC # BLD AUTO: 4.02 10*6/MM3 (ref 3.77–5.28)
WBC # BLD AUTO: 8.8 10*3/MM3 (ref 3.4–10.8)

## 2021-03-02 PROCEDURE — 96367 TX/PROPH/DG ADDL SEQ IV INF: CPT

## 2021-03-02 PROCEDURE — 99213 OFFICE O/P EST LOW 20 MIN: CPT | Performed by: NURSE PRACTITIONER

## 2021-03-02 PROCEDURE — 96366 THER/PROPH/DIAG IV INF ADDON: CPT

## 2021-03-02 PROCEDURE — 96365 THER/PROPH/DIAG IV INF INIT: CPT

## 2021-03-02 PROCEDURE — 25010000002 IMMUNE GLOBULIN (HUMAN) 30 GM/300ML SOLUTION: Performed by: NURSE PRACTITIONER

## 2021-03-02 PROCEDURE — 85025 COMPLETE CBC W/AUTO DIFF WBC: CPT

## 2021-03-02 PROCEDURE — 82784 ASSAY IGA/IGD/IGG/IGM EACH: CPT | Performed by: NURSE PRACTITIONER

## 2021-03-02 PROCEDURE — 36415 COLL VENOUS BLD VENIPUNCTURE: CPT

## 2021-03-02 RX ORDER — ACETAMINOPHEN 325 MG/1
650 TABLET ORAL ONCE
Status: CANCELLED | OUTPATIENT
Start: 2021-03-02

## 2021-03-02 RX ORDER — HYDROXYZINE PAMOATE 25 MG/1
25 CAPSULE ORAL ONCE
Status: CANCELLED | OUTPATIENT
Start: 2021-03-02

## 2021-03-02 RX ORDER — HYDROXYZINE PAMOATE 25 MG/1
25 CAPSULE ORAL ONCE
Status: COMPLETED | OUTPATIENT
Start: 2021-03-02 | End: 2021-03-02

## 2021-03-02 RX ORDER — DIPHENHYDRAMINE HYDROCHLORIDE 50 MG/ML
50 INJECTION INTRAMUSCULAR; INTRAVENOUS AS NEEDED
Status: CANCELLED | OUTPATIENT
Start: 2021-03-02

## 2021-03-02 RX ORDER — SODIUM CHLORIDE 9 MG/ML
250 INJECTION, SOLUTION INTRAVENOUS ONCE
Status: CANCELLED | OUTPATIENT
Start: 2021-03-02

## 2021-03-02 RX ORDER — FAMOTIDINE 10 MG/ML
20 INJECTION, SOLUTION INTRAVENOUS AS NEEDED
Status: CANCELLED | OUTPATIENT
Start: 2021-03-02

## 2021-03-02 RX ORDER — ACETAMINOPHEN 325 MG/1
650 TABLET ORAL ONCE
Status: COMPLETED | OUTPATIENT
Start: 2021-03-02 | End: 2021-03-02

## 2021-03-02 RX ORDER — SODIUM CHLORIDE 9 MG/ML
250 INJECTION, SOLUTION INTRAVENOUS ONCE
Status: COMPLETED | OUTPATIENT
Start: 2021-03-02 | End: 2021-03-02

## 2021-03-02 RX ADMIN — ACETAMINOPHEN 650 MG: 325 TABLET ORAL at 10:45

## 2021-03-02 RX ADMIN — HYDROXYZINE PAMOATE 25 MG: 25 CAPSULE ORAL at 10:45

## 2021-03-02 RX ADMIN — SODIUM CHLORIDE 250 ML: 9 INJECTION, SOLUTION INTRAVENOUS at 11:18

## 2021-03-02 RX ADMIN — IMMUNE GLOBULIN INFUSION (HUMAN) 30 G: 100 INJECTION, SOLUTION INTRAVENOUS; SUBCUTANEOUS at 11:18

## 2021-03-02 NOTE — PROGRESS NOTES
Subjective .     REASONS FOR FOLLOW UP:  1. chronic lymphocytic leukemia with recurrent lung infections    Referring MD:    Amarilis Suarez MD    History of Present Illness patient is an.age female with 2014 with stage 0 CLL which is never required treatments.    Over the last year however she has had multiple hospitalizations for lung infections predominantly viral probably also bacterial and at her last hospitalization in October we rechecked her gammaglobulins which were low and opted to start IV IgG monthly to see if this would keep her out of the hospital.    She has continued on monthly IVIG since October.  She was due couple of weeks ago however the appointment was rescheduled due to the weather.  Patient denies any infections over the past month.  She has had no fevers, burning with urination, or cough.  She continues to reside at the [place] at an independent living apartment.  She has received both COVID-19 injections.  She tolerated these well she reports.      Past Medical History:   Diagnosis Date   • Aortic calcification (CMS/HCC)     mild, on echo 12/17/2017   • Aortic regurgitation     Trace   • Asthma    • Atrial fibrillation (CMS/HCC)    • CAD (coronary artery disease)    • Chronic combined systolic and diastolic congestive heart failure (CMS/HCC)    • CKD (chronic kidney disease) stage 3, GFR 30-59 ml/min (CMS/HCC)    • Coronary artery disease involving native coronary artery of native heart with angina pectoris (CMS/HCC)    • Disc degeneration, lumbar    • DM type 2 (diabetes mellitus, type 2) (CMS/HCC)    • GERD (gastroesophageal reflux disease)    • History of aneurysm     right femoral artery s/p LHC   • History of blood transfusion    • History of fracture    • History of vitamin D deficiency    • Hyperlipidemia    • Hypertension    • Mild mitral regurgitation    • Mitral annular calcification     12/8/2017- echo, moderate   • PAF (paroxysmal atrial fibrillation) (CMS/HCC)    •  Peripheral neuropathy    • Skin cancer     Left hand   • Sleep apnea    • SSS (sick sinus syndrome) (CMS/HCC)    • Stroke (cerebrum) (CMS/HCC)    • Tricuspid regurgitation     Trace       ONCOLOGIC HISTORY:    [facility] HISTORY  Patient is an 85-year-old female whom I had seen in 2014 when she was hospitalized at [facility] and was diagnosed with chronic lymphocytic leukemia.  She has not been to see me in over 4 years and is following with Dr. Suarez her primary care doctor and her white count is gone up a little higher than usual to 22,000 and she is referred back to us for evaluation.  We are doing a telephone visit because of the coronavirus pandemic and her age and susceptibility.    When I originally saw her in 2014 she was brought into the ER unresponsive in septic shock on 03/25/2014 with methicillin-resistant staphylococcus identified in her blood cultures. The patient has been on antibiotic with improvement, but had some residual kidney damage with a creatinine in the 4-range requiring hemodialysis, and was noted on admission to have an elevated white count with lymphocytosis, normal hemoglobin, but a platelet count of 95,000, and over the course of the illness her platelet count normalized and the white count had gone up into the 20,000 range with lymphocytosis. A flow cytometry was sent which is consistent with CLL, she came to our office once or twice and then stopped coming because she was so stable     She tells me now she was hospitalized recently with rhinovirus infection and has had diagnosed with asthma and is having a lot of respiratory issues but does not require oxygen.She is at the [facility] home in independent living and managing fairly well    She denies fever sweats or weight loss.  Her last white count was on 5/26/2020  Showed a white count of 18,000 with 66 lymphs and 27 neutrophils platelet white hemoglobin is 12.6 platelet 188,000    I reassured Christopher that no treatment is indicated for this  level of leukocytosis from CLL and if she has no systemic complaints I do not feel strongly about doing CAT scans at her age but I would like to physically examine her in the office in a couple of months to make sure there is no obvious adenopathy or hepatosplenomegaly.    She does have recurrent infections and we can check quantitative immunoglobulins to see how low they are as another adjunctive option to prevent recurrent infections if she has hypogammaglobulinemia    She remains on Coumadin for atrial fibrillation      Current Outpatient Medications on File Prior to Visit   Medication Sig Dispense Refill   • acetaminophen (TYLENOL) 325 MG tablet Take 2 tablets by mouth Every 4 (Four) Hours As Needed for Mild Pain .     • acetylcysteine (MUCOMYST) 20 % nebulizer solution Take 2 mL by nebulization 4 (Four) Times a Day.     • albuterol (PROVENTIL) (2.5 MG/3ML) 0.083% nebulizer solution Take 2.5 mg by nebulization Every 4 (Four) Hours.     • albuterol sulfate  (90 Base) MCG/ACT inhaler Inhale 2 puffs Every 4 (Four) Hours As Needed for Wheezing. 1 inhaler 3   • aspirin 81 MG tablet Take 81 mg by mouth Daily.     • Benralizumab (FASENRA SC) Inject  under the skin into the appropriate area as directed. Gets one shot a month, next shot may 13 then one every 8 weeks     • Calcium Carbonate-Vitamin D (CALCIUM 500 + D PO) Take  by mouth 2 (two) times a day.     • carvedilol (COREG) 3.125 MG tablet TAKE ONE TABLET BY MOUTH TWICE A  tablet 1   • fluticasone (FLONASE) 50 MCG/ACT nasal spray 1 spray into the nostril(s) as directed by provider Daily.     • Fluticasone-Umeclidin-Vilant (Trelegy Ellipta) 100-62.5-25 MCG/INH aerosol powder  Inhale 1 puff Daily. As directed      • furosemide (LASIX) 20 MG tablet Take 20 mg by mouth Every Morning.     • glipizide (GLUCOTROL XL) 2.5 MG 24 hr tablet      • glipizide (GLUCOTROL) 5 MG tablet      • guaiFENesin (MUCINEX) 600 MG 12 hr tablet Take 1 tablet by mouth Every 12  (Twelve) Hours.     • Loperamide HCl (IMODIUM PO) Take  by mouth Daily.     • LORazepam (ATIVAN) 0.5 MG tablet      • losartan (COZAAR) 25 MG tablet Take 1 tablet by mouth Daily. 30 tablet 11   • montelukast (SINGULAIR) 10 MG tablet Take 10 mg by mouth Every Night.     • Omega-3 Fatty Acids (FISH OIL) 1000 MG capsule capsule Take 1,000 mg by mouth 2 (Two) Times a Day With Meals.     • oxybutynin XL (DITROPAN-XL) 10 MG 24 hr tablet Take 10 mg by mouth every night at bedtime.     • pantoprazole (PROTONIX) 40 MG EC tablet Take 40 mg by mouth Daily.     • polyethyl glycol-propyl glycol (LUBRICATING EYE DROPS) 0.4-0.3 % solution ophthalmic solution Administer 1 drop to both eyes Every 1 (One) Hour As Needed.     • rosuvastatin (CRESTOR) 20 MG tablet Take 20 mg by mouth Every Night.     • sodium chloride 0.65 % nasal spray 1 spray into the nostril(s) as directed by provider As Needed for Congestion.  12   • Tobramycin 300 MG/4ML nebulizer solution      • trimethoprim (TRIMPEX) 100 MG tablet Take 100 mg by mouth Daily. For UTI prophylaxis     • warfarin (COUMADIN) 4 MG tablet TAKE ONE TABLET BY MOUTH ON MON, WED AND FRI. TAKE ONE-HALF TABLET ON ALL OTHER DAYS Or as directed by [facility] 70 tablet 1     No current facility-administered medications on file prior to visit.        ALLERGIES:     Allergies   Allergen Reactions   • Accupril [Quinapril Hcl] Swelling, Other (See Comments), GI Intolerance and Delirium     HA, constipation    • Ahist [Chlorpheniramine] Nausea Only, Other (See Comments) and Dizziness     Headache, Blurred vision   • Clarithromycin Nausea Only, Other (See Comments) and Mental Status Change     HA, Depression, Flushing   • Esomeprazole GI Intolerance   • Levalbuterol Swelling   • Levocetirizine Diarrhea and GI Intolerance   • Lipitor [Atorvastatin] Other (See Comments) and Myalgia     Dark urine   • Omeprazole Nausea Only and Other (See Comments)     HA   • Pravachol [Pravastatin] Nausea  Only and GI Intolerance     Bloated, Constipation, HA   • Sulindac Other (See Comments) and Myalgia     HA, joint pain, bruising   • Valdecoxib Irritability   • Chlorcyclizine Unknown - Low Severity   • Diclofenac Sodium Unknown - Low Severity   • Diphenhydramine Unknown - Low Severity   • Lodine [Etodolac] Unknown - Low Severity   • Sulfa Antibiotics Unknown - Low Severity       Social History     Socioeconomic History   • Marital status: Single     Spouse name: Not on file   • Number of children: Not on file   • Years of education: Not on file   • Highest education level: Not on file   Occupational History     Employer: RETIRED   Tobacco Use   • Smoking status: Never Smoker   • Smokeless tobacco: Never Used   • Tobacco comment: caffeine use- soda   Substance and Sexual Activity   • Alcohol use: Never     Frequency: Never   • Drug use: No   • Sexual activity: Defer   Lifestyle   • Physical activity     Days per week: 0 days     Minutes per session: 0 min   • Stress: Only a little         Cancer-related family history includes Colon cancer in her sister.     I have reviewed the patient's medical history in detail and updated the computerized patient record.    Review of Systems   Constitutional: Negative for appetite change, chills, diaphoresis, fatigue, fever and unexpected weight change.   HENT: Negative for mouth sores and sore throat.    Eyes: Negative for visual disturbance.   Respiratory: Positive for shortness of breath (stable). Negative for cough and wheezing.    Gastrointestinal: Negative.  Negative for abdominal pain, diarrhea, nausea and vomiting.   Genitourinary: Negative.  Negative for dysuria and frequency.   Musculoskeletal: Positive for back pain.   Neurological: Negative.  Negative for dizziness and weakness.   Hematological: Does not bruise/bleed easily.   Psychiatric/Behavioral: Negative.  The patient is not nervous/anxious.    All other systems reviewed and are negative.  I have reviewed and  confirmed the accuracy of the ROS as documented by the MA/LPN/RN KRISTIN Collazo      Objective      Vitals:    03/02/21 0939   BP: 105/61   Pulse: 93   Resp: 18   Temp: 96.9 °F (36.1 °C)   SpO2: 99%     Current Status 3/2/2021   ECOG score 1     Pain Score    03/02/21 0939   PainSc: 0-No pain         Physical Exam   Constitutional: She is oriented to person, place, and time. She appears well-developed. No distress.   HENT:   Head: Normocephalic and atraumatic.   Eyes: Conjunctivae are normal.   Neck: Normal range of motion. No JVD present.   Cardiovascular: Normal rate and regular rhythm.   Pulmonary/Chest: Effort normal. No respiratory distress. She has no wheezes.   Abdominal: Soft. Bowel sounds are normal.   Neurological: She is alert and oriented to person, place, and time.   Skin: Skin is warm and dry.   Psychiatric: Her behavior is normal.   Vitals reviewed.   I have reexamined the patient 03/02/2021 and the results are consistent with the previously documented exam. KRISTIN Collazo       RECENT LABS:  Results from last 7 days   Lab Units 03/02/21  0928   WBC 10*3/mm3 8.80   NEUTROS ABS 10*3/mm3 3.95   HEMOGLOBIN g/dL 11.4*   HEMATOCRIT % 36.8   PLATELETS 10*3/mm3 175         Results from last 7 days   Lab Units 03/01/21   INR  1.50            Assessment/Plan    1. Chronic lymphocytic leukemia  -stage 0 since 2014 untreated    2.  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  The patient continues to require IVIG replacement.  IgG is pending today.    3.  COPD/asthma /emphysema on home O2-recurrent respiratory infections with RSV and rhinovirus and bacteria    4.  Atrial fibrillation on Coumadin    5.  Hypertension/hypercholesterolemia/type 2 diabetes on treatment    Plan  1.  Continue monthly IV IgG  2.  IgG is pending today  3.  Return in 1 month for CBC, monthly IVIG  4.  MD follow-up with Dr. Larkin in 1 months will discuss continued IVIG  at that time.  5.  No treatment indicated for CLL at this time        Ester Rebolledo, APRN

## 2021-03-10 ENCOUNTER — ANTICOAGULATION VISIT (OUTPATIENT)
Dept: PHARMACY | Facility: HOSPITAL | Age: 86
End: 2021-03-10

## 2021-03-10 DIAGNOSIS — I48.0 PAROXYSMAL ATRIAL FIBRILLATION (HCC): ICD-10-CM

## 2021-03-10 LAB — INR PPP: 1.5

## 2021-03-10 RX ORDER — CEPHALEXIN 500 MG/1
500 CAPSULE ORAL EVERY 8 HOURS
COMMUNITY
End: 2021-05-10

## 2021-03-10 NOTE — PROGRESS NOTES
Anticoagulation Clinic Progress Note    Anticoagulation Summary  As of 3/10/2021    INR goal:  2.0-3.0   TTR:  70.4 % (2.2 y)   INR used for dosin.50 (3/10/2021)   Warfarin maintenance plan:  2 mg every Sun, Tue, Thu; 4 mg all other days   Weekly warfarin total:  22 mg   Plan last modified:  Alexander Valiente RPH (3/10/2021)   Next INR check:  3/17/2021   Priority:  Maintenance   Target end date:  Indefinite    Indications    Paroxysmal atrial fibrillation (CMS/HCC) [I48.0]             Anticoagulation Episode Summary     INR check location:      Preferred lab:      Send INR reminders to:   [facility]    Comments:  [facility] beginning 20      Anticoagulation Care Providers     Provider Role Specialty Phone number    Jonah Regalado Jr., MD Referring Cardiology [phone]          Clinic Interview:  Patient Findings     Positives:  Other complaints    Negatives:  Signs/symptoms of thrombosis, Signs/symptoms of bleeding,   Laboratory test error suspected, Change in health, Change in alcohol use,   Change in activity, Upcoming invasive procedure, Emergency department   visit, Upcoming dental procedure, Missed doses, Extra doses, Change in   medications, Change in diet/appetite, Hospital admission, Bruising    Comments:  Pt corrected previous report - cephalexin 500 mg q8h was not   discontinued and is a long-term med; added back to med list.      Clinical Outcomes     Negatives:  Major bleeding event, Thromboembolic event,   Anticoagulation-related hospital admission, Anticoagulation-related ED   visit, Anticoagulation-related fatality    Comments:  Pt corrected previous report - cephalexin 500 mg q8h was not   discontinued and is a long-term med; added back to med list.        INR History:  Anticoagulation Monitoring 2021 3/1/2021 3/10/2021   INR 1.50 1.50 1.50   INR Date 2021 3/1/2021 3/10/2021   INR Goal 2.0-3.0 2.0-3.0 2.0-3.0   Trend Up Same Up   Last Week Total 18 mg 20 mg  20 mg   Next Week Total 20 mg 22 mg 24 mg   Sun 2 mg 2 mg 2 mg   Mon 4 mg 6 mg (3/1); Otherwise 4 mg 4 mg   Tue 2 mg 2 mg 2 mg   Wed 4 mg 4 mg 6 mg (3/10)   Thu 2 mg 2 mg 2 mg   Fri 4 mg 4 mg 4 mg   Sat 2 mg 2 mg 4 mg   Visit Report - - -   Some recent data might be hidden       Plan:  1. INR is Subtherapeutic today- see above in Anticoagulation Summary.   Will instruct Caitlyn Longoria to Increase their warfarin regimen- see above in Anticoagulation Summary.  2. Follow up in 1 week  3. They have been instructed to call if any changes in medications, doses, concerns, etc. Patient expresses understanding and has no further questions at this time.    Alexander Valiente [facility]

## 2021-03-17 ENCOUNTER — OFFICE VISIT (OUTPATIENT)
Dept: INFECTIOUS DISEASES | Facility: CLINIC | Age: 86
End: 2021-03-17

## 2021-03-17 VITALS
RESPIRATION RATE: 14 BRPM | WEIGHT: 209.6 LBS | TEMPERATURE: 97.1 F | DIASTOLIC BLOOD PRESSURE: 77 MMHG | SYSTOLIC BLOOD PRESSURE: 122 MMHG | HEIGHT: 63 IN | HEART RATE: 87 BPM | BODY MASS INDEX: 37.14 KG/M2

## 2021-03-17 DIAGNOSIS — A49.01 MSSA (METHICILLIN SUSCEPTIBLE STAPHYLOCOCCUS AUREUS) INFECTION: Primary | ICD-10-CM

## 2021-03-17 DIAGNOSIS — D80.1 HYPOGAMMAGLOBULINEMIA (HCC): ICD-10-CM

## 2021-03-17 DIAGNOSIS — Z79.2 LONG TERM (CURRENT) USE OF ANTIBIOTICS: ICD-10-CM

## 2021-03-17 DIAGNOSIS — T84.59XD INFECTION OF PROSTHETIC KNEE JOINT, SUBSEQUENT ENCOUNTER: ICD-10-CM

## 2021-03-17 DIAGNOSIS — Z96.659 INFECTION OF PROSTHETIC KNEE JOINT, SUBSEQUENT ENCOUNTER: ICD-10-CM

## 2021-03-17 PROCEDURE — 99213 OFFICE O/P EST LOW 20 MIN: CPT | Performed by: INTERNAL MEDICINE

## 2021-03-17 RX ORDER — CHOLECALCIFEROL (VITAMIN D3) 125 MCG
5 CAPSULE ORAL NIGHTLY
COMMUNITY

## 2021-03-17 NOTE — PROGRESS NOTES
"CC: f/u Prosthetic right knee infection - culture negative    HPI: Caitlyn Longoria is a 86 y.o. female here for f/u prosthetic right knee infection - culture negative. She remains on suppressive cephalexin since she is not a surgical candidate (or at least a poor surgical candidate) per my prior discussions Dr Hunter. She denies knee pain. \"It is wonderful.\" No erythema, heat, or drainage. She says she has graduated from Dr Hunter's office and will only be seeing him as needed.     She has been diagnosed with hypogammaglobulinemia and has been IVIg infusions monthly since October 2020. She says she feels better than she has in years.     She continues to use inhaled tobramycin under the direction of Dr Coburn. She is currently on 3L NC around-the-clock but says she is hoping to come off of it soon.     She has not ever been diagnosed with COVID-19.     She has received both doses of her COVID-19 vaccine (last done 2/5/21---[employer]).     Review of Systems: no rashes or vomiting; no diarrhea    PMH:  R knee replacement  R knee infection due to MSSA s/p liner exchange in 2014  DM2  Hernia repair  Pacemaker  Afib on coumadin     Social History:  Retired from [employer] company  Lives alone in [place]     Family History:  No 1st degree relatives w/ bone or joint infections     Antbiotic allergies:    1. Sulfa  2. Clarithromycin - headache    Medications:   Current Outpatient Medications:   •  acetaminophen (TYLENOL) 325 MG tablet, Take 2 tablets by mouth Every 4 (Four) Hours As Needed for Mild Pain ., Disp:  , Rfl:   •  acetylcysteine (MUCOMYST) 20 % nebulizer solution, Take 2 mL by nebulization 4 (Four) Times a Day., Disp:  , Rfl:   •  albuterol (PROVENTIL) (2.5 MG/3ML) 0.083% nebulizer solution, Take 2.5 mg by nebulization Every 4 (Four) Hours., Disp: , Rfl:   •  albuterol sulfate  (90 Base) MCG/ACT inhaler, Inhale 2 puffs Every 4 (Four) Hours As Needed for Wheezing., Disp: 1 inhaler, Rfl: 3  •  aspirin 81 MG tablet, " Take 81 mg by mouth Daily., Disp: , Rfl:   •  Benralizumab (FASENRA SC), Inject  under the skin into the appropriate area as directed. Gets one shot a month, next shot may 13 then one every 8 weeks, Disp: , Rfl:   •  Calcium Carbonate-Vitamin D (CALCIUM 500 + D PO), Take  by mouth 2 (two) times a day., Disp: , Rfl:   •  carvedilol (COREG) 3.125 MG tablet, TAKE ONE TABLET BY MOUTH TWICE A DAY, Disp: 180 tablet, Rfl: 1  •  cephalexin (KEFLEX) 500 MG capsule, Take 500 mg by mouth Every 8 (Eight) Hours., Disp: , Rfl:   •  fluticasone (FLONASE) 50 MCG/ACT nasal spray, 1 spray into the nostril(s) as directed by provider Daily., Disp: , Rfl:   •  Fluticasone-Umeclidin-Vilant (Trelegy Ellipta) 100-62.5-25 MCG/INH aerosol powder , Inhale 1 puff Daily. As directed , Disp: , Rfl:   •  furosemide (LASIX) 20 MG tablet, Take 20 mg by mouth Every Morning., Disp: , Rfl:   •  glipizide (GLUCOTROL XL) 2.5 MG 24 hr tablet, , Disp: , Rfl:   •  guaiFENesin (MUCINEX) 600 MG 12 hr tablet, Take 1 tablet by mouth Every 12 (Twelve) Hours., Disp:  , Rfl:   •  Loperamide HCl (IMODIUM PO), Take  by mouth Daily., Disp: , Rfl:   •  LORazepam (ATIVAN) 0.5 MG tablet, , Disp: , Rfl:   •  losartan (COZAAR) 25 MG tablet, Take 1 tablet by mouth Daily., Disp: 30 tablet, Rfl: 11  •  melatonin 5 MG tablet tablet, Take 5 mg by mouth Every Night., Disp: , Rfl:   •  metFORMIN (GLUCOPHAGE) 500 MG tablet, Take 500 mg by mouth Daily With Breakfast., Disp: , Rfl:   •  montelukast (SINGULAIR) 10 MG tablet, Take 10 mg by mouth Every Night., Disp: , Rfl:   •  Omega-3 Fatty Acids (FISH OIL) 1000 MG capsule capsule, Take 1,000 mg by mouth 2 (Two) Times a Day With Meals., Disp: , Rfl:   •  oxybutynin XL (DITROPAN-XL) 10 MG 24 hr tablet, Take 10 mg by mouth every night at bedtime., Disp: , Rfl:   •  pantoprazole (PROTONIX) 40 MG EC tablet, Take 40 mg by mouth Daily., Disp: , Rfl:   •  polyethyl glycol-propyl glycol (LUBRICATING EYE DROPS) 0.4-0.3 % solution ophthalmic  "solution, Administer 1 drop to both eyes Every 1 (One) Hour As Needed., Disp: , Rfl:   •  rosuvastatin (CRESTOR) 20 MG tablet, Take 20 mg by mouth Every Night., Disp: , Rfl:   •  sodium chloride 0.65 % nasal spray, 1 spray into the nostril(s) as directed by provider As Needed for Congestion., Disp:  , Rfl: 12  •  Tobramycin 300 MG/4ML nebulizer solution, , Disp: , Rfl:   •  trimethoprim (TRIMPEX) 100 MG tablet, Take 100 mg by mouth Daily. For UTI prophylaxis, Disp: , Rfl:   •  warfarin (COUMADIN) 4 MG tablet, TAKE ONE TABLET BY MOUTH ON MON, WED AND FRI. TAKE ONE-HALF TABLET ON ALL OTHER DAYS Or as directed by [facility], Disp: 70 tablet, Rfl: 1    OBJECTIVE:  /77   Pulse 87   Temp 97.1 °F (36.2 °C)   Resp 14   Ht 160 cm (63\")   Wt 95.1 kg (209 lb 9.6 oz)   BMI 37.13 kg/m²     General: awake, alert, NAD, very nice  Eyes: no scleral icterus  ENT: wearing mask  Respiratory: normal work of breathing on 3L NC  GI: Abdomen is obese, soft  Musculoskeletal: R knee incision is healed; knee is neither hot nor erythematous; not TTP  Skin: No rashes; bruises on arms  Neurological: Alert and oriented x 3  Psychiatric: Normal mood and affect     DIAGNOSTICS:  CBC, BMP, CRP, ESR, INR reviewed today  Lab Results   Component Value Date    WBC 8.80 03/02/2021    HGB 11.4 (L) 03/02/2021    HCT 36.8 03/02/2021    MCV 91.5 03/02/2021     03/02/2021     Lab Results   Component Value Date    GLUCOSE 91 12/16/2020    CALCIUM 8.5 03/08/2021     03/08/2021    K 4.7 03/08/2021    CO2 29 03/08/2021     03/08/2021    BUN 25 (H) 03/08/2021    CREATININE 1.2 03/08/2021    EGFRIFNONA 43 (L) 12/16/2020    BCR 22.0 (H) 03/08/2021    ANIONGAP 10.6 12/16/2020     Lab Results   Component Value Date    CRP 0.05 08/05/2020     Lab Results   Component Value Date    SEDRATE 8 08/05/2020     Lab Results   Component Value Date    INR 1.50 03/10/2021    INR 1.50 03/01/2021    INR 1.50 02/22/2021    PROTIME 20.4 (H) " 11/09/2020    PROTIME 25.0 (H) 10/14/2020    PROTIME 22.3 (H) 10/13/2020     Microbiology:  1/14/18 BCx: negative  1/15/18 Synovial Fluid R Knee: Negative at 14 days    ASSESSMENT/PLAN:  1. Prosthetic right knee infection - culture negative (but history of MSSA)  -not a very good surgical candidate per my prior discussions with Dr Hunter  -completed 6 weeks of IV cefazolin in February 2018 and is now on suppressive antibiotic therapy with cephalexin 500 mg PO q8h for chronic suppressive antibiotic therapy  -recent labs reviewed; no need for repeat labs today  -she will call me if any new concerning knee/infection s ymptoms    2. Long term use of antibiotics  -recent labs reviewed    3. Hypogammaglobulinemia  -on IVIg infusions through [facility]    RTC 12 months or sooner if needed

## 2021-03-19 ENCOUNTER — ANTICOAGULATION VISIT (OUTPATIENT)
Dept: PHARMACY | Facility: HOSPITAL | Age: 86
End: 2021-03-19

## 2021-03-19 DIAGNOSIS — I48.0 PAROXYSMAL ATRIAL FIBRILLATION (HCC): ICD-10-CM

## 2021-03-19 LAB — INR PPP: 1.4

## 2021-03-19 NOTE — PROGRESS NOTES
Anticoagulation Clinic Progress Note    Anticoagulation Summary  As of 3/19/2021    INR goal:  2.0-3.0   TTR:  69.7 % (2.3 y)   INR used for dosin.40 (3/19/2021)   Warfarin maintenance plan:  2 mg every Sun, Tue, Thu; 4 mg all other days   Weekly warfarin total:  22 mg   Plan last modified:  Alexander Valiente RP (3/10/2021)   Next INR check:  3/24/2021   Priority:  Maintenance   Target end date:  Indefinite    Indications    Paroxysmal atrial fibrillation (CMS/HCC) [I48.0]             Anticoagulation Episode Summary     INR check location:      Preferred lab:      [facility]:   [facility]    Comments:  [facility] beginning 20      Anticoagulation Care Providers     Provider Role Specialty Phone number    Jonah Regalado Jr., MD Referring Cardiology [phone]          Clinic Interview:  Patient Findings     Negatives:  Signs/symptoms of thrombosis, Signs/symptoms of bleeding,   Laboratory test error suspected, Change in health, Change in alcohol use,   Change in activity, Upcoming invasive procedure, Emergency department   visit, Upcoming dental procedure, Missed doses, Extra doses, Change in   medications, Change in diet/appetite, Hospital admission, Bruising, Other   complaints      Clinical Outcomes     Negatives:  Major bleeding event, Thromboembolic event,   Anticoagulation-related hospital admission, Anticoagulation-related ED   visit, Anticoagulation-related fatality        INR History:  Anticoagulation Monitoring 3/1/2021 3/10/2021 3/19/2021   INR 1.50 1.50 1.40   INR Date 3/1/2021 3/10/2021 3/19/2021   INR Goal 2.0-3.0 2.0-3.0 2.0-3.0   Trend Same Up Same   Last Week Total 20 mg 20 mg 22 mg   Next Week Total 22 mg 24 mg 28 mg   Sun 2 mg 2 mg 4 mg (3/21)   Mon 6 mg (3/1); Otherwise 4 mg 4 mg 4 mg   Tue 2 mg 2 mg 4 mg (3/23)   Wed 4 mg 6 mg (3/10) -   Thu 2 mg 2 mg -   Fri 4 mg 4 mg 6 mg (3/19)   Sat 2 mg 4 mg 4 mg   Visit Report - - -   Some recent data might be  hidden       Plan:  1. INR is Subtherapeutic today- see above in Anticoagulation Summary.   Will instruct Caitlyn Longoria to Change their warfarin regimen- see above in Anticoagulation Summary.  2. Follow up in 5 days  3. They have been instructed to call if any changes in medications, doses, concerns, etc. Patient expresses understanding and has no further questions at this time.    Alexander Valiente [facility]

## 2021-03-24 ENCOUNTER — ANTICOAGULATION VISIT (OUTPATIENT)
Dept: PHARMACY | Facility: HOSPITAL | Age: 86
End: 2021-03-24

## 2021-03-24 DIAGNOSIS — I48.0 PAROXYSMAL ATRIAL FIBRILLATION (HCC): ICD-10-CM

## 2021-03-24 LAB — INR PPP: 2.1

## 2021-03-24 NOTE — PROGRESS NOTES
Anticoagulation Clinic Progress Note    Anticoagulation Summary  As of 3/24/2021    INR goal:  2.0-3.0   TTR:  69.3 % (2.3 y)   INR used for dosin.10 (3/24/2021)   Warfarin maintenance plan:  4 mg every day   Weekly warfarin total:  28 mg   Plan last modified:  Alexander Valiente RPH (3/24/2021)   Next INR check:  3/31/2021   Priority:  Maintenance   Target end date:  Indefinite    Indications    Paroxysmal atrial fibrillation (CMS/HCC) [I48.0]             Anticoagulation Episode Summary     INR check location:      Preferred lab:      Send INR reminders to:   [facility] CLINICAL POOL    Comments:  [facility] beginning 20      Anticoagulation Care Providers     Provider Role Specialty Phone number    Jonah Regalado Jr., MD Referring Cardiology [phone]          Clinic Interview:  Patient Findings     Negatives:  Signs/symptoms of thrombosis, Signs/symptoms of bleeding,   Laboratory test error suspected, Change in health, Change in alcohol use,   Change in activity, Upcoming invasive procedure, Emergency department   visit, Upcoming dental procedure, Missed doses, Extra doses, Change in   medications, Change in diet/appetite, Hospital admission, Bruising, Other   complaints      Clinical Outcomes     Negatives:  Major bleeding event, Thromboembolic event,   Anticoagulation-related hospital admission, Anticoagulation-related ED   visit, Anticoagulation-related fatality        INR History:  Anticoagulation Monitoring 3/10/2021 3/19/2021 3/24/2021   INR 1.50 1.40 2.10   INR Date 3/10/2021 3/19/2021 3/24/2021   INR Goal 2.0-3.0 2.0-3.0 2.0-3.0   Trend Up Same Up   Last Week Total 20 mg 22 mg 28 mg   Next Week Total 24 mg 28 mg 28 mg   Sun 2 mg 4 mg (3/21) 4 mg   Mon 4 mg 4 mg 4 mg   Tue 2 mg 4 mg (3/23) 4 mg   Wed 6 mg (3/10) - 4 mg   Thu 2 mg - 4 mg   Fri 4 mg 6 mg (3/19) 4 mg   Sat 4 mg 4 mg 4 mg   Visit Report - - -   Some recent data might be hidden       Plan:  1. INR is Therapeutic today- see  above in Anticoagulation Summary.   Will instruct Caitlyn Longoria to Change their warfarin regimen- see above in Anticoagulation Summary.  2. Follow up in 1 week  3. They have been instructed to call if any changes in medications, doses, concerns, etc. Patient expresses understanding and has no further questions at this time.    Alexander Valiente [facility]

## 2021-03-30 ENCOUNTER — INFUSION (OUTPATIENT)
Dept: ONCOLOGY | Facility: HOSPITAL | Age: 86
End: 2021-03-30

## 2021-03-30 ENCOUNTER — OFFICE VISIT (OUTPATIENT)
Dept: ONCOLOGY | Facility: CLINIC | Age: 86
End: 2021-03-30

## 2021-03-30 ENCOUNTER — LAB (OUTPATIENT)
Dept: ONCOLOGY | Facility: HOSPITAL | Age: 86
End: 2021-03-30

## 2021-03-30 VITALS
TEMPERATURE: 98 F | DIASTOLIC BLOOD PRESSURE: 88 MMHG | WEIGHT: 211.6 LBS | OXYGEN SATURATION: 99 % | BODY MASS INDEX: 37.49 KG/M2 | RESPIRATION RATE: 16 BRPM | HEART RATE: 89 BPM | SYSTOLIC BLOOD PRESSURE: 131 MMHG | HEIGHT: 63 IN

## 2021-03-30 VITALS — SYSTOLIC BLOOD PRESSURE: 118 MMHG | DIASTOLIC BLOOD PRESSURE: 79 MMHG | HEART RATE: 86 BPM

## 2021-03-30 DIAGNOSIS — C91.12 CLL (CHRONIC LYMPHOID LEUKEMIA) IN RELAPSE (HCC): ICD-10-CM

## 2021-03-30 DIAGNOSIS — I48.0 PAROXYSMAL ATRIAL FIBRILLATION (HCC): ICD-10-CM

## 2021-03-30 DIAGNOSIS — Z79.01 ANTICOAGULATED ON COUMADIN: ICD-10-CM

## 2021-03-30 DIAGNOSIS — C91.12 CLL (CHRONIC LYMPHOID LEUKEMIA) IN RELAPSE (HCC): Primary | ICD-10-CM

## 2021-03-30 DIAGNOSIS — D80.1 HYPOGAMMAGLOBULINEMIA (HCC): ICD-10-CM

## 2021-03-30 LAB
BASOPHILS # BLD AUTO: 0 10*3/MM3 (ref 0–0.2)
BASOPHILS NFR BLD AUTO: 0 % (ref 0–1.5)
DEPRECATED RDW RBC AUTO: 52.7 FL (ref 37–54)
EOSINOPHIL # BLD AUTO: 0 10*3/MM3 (ref 0–0.4)
EOSINOPHIL NFR BLD AUTO: 0 % (ref 0.3–6.2)
ERYTHROCYTE [DISTWIDTH] IN BLOOD BY AUTOMATED COUNT: 15.5 % (ref 12.3–15.4)
HCT VFR BLD AUTO: 35.8 % (ref 34–46.6)
HGB BLD-MCNC: 10.9 G/DL (ref 12–15.9)
IGA1 MFR SER: 14 MG/DL (ref 70–400)
IGG1 SER-MCNC: 761 MG/DL (ref 700–1600)
IGM SERPL-MCNC: 31 MG/DL (ref 40–230)
IMM GRANULOCYTES # BLD AUTO: 0.02 10*3/MM3 (ref 0–0.05)
IMM GRANULOCYTES NFR BLD AUTO: 0.3 % (ref 0–0.5)
LYMPHOCYTES # BLD AUTO: 3.52 10*3/MM3 (ref 0.7–3.1)
LYMPHOCYTES NFR BLD AUTO: 47.4 % (ref 19.6–45.3)
MCH RBC QN AUTO: 28.5 PG (ref 26.6–33)
MCHC RBC AUTO-ENTMCNC: 30.4 G/DL (ref 31.5–35.7)
MCV RBC AUTO: 93.5 FL (ref 79–97)
MONOCYTES # BLD AUTO: 0.32 10*3/MM3 (ref 0.1–0.9)
MONOCYTES NFR BLD AUTO: 4.3 % (ref 5–12)
NEUTROPHILS NFR BLD AUTO: 3.57 10*3/MM3 (ref 1.7–7)
NEUTROPHILS NFR BLD AUTO: 48 % (ref 42.7–76)
NRBC BLD AUTO-RTO: 0 /100 WBC (ref 0–0.2)
PLATELET # BLD AUTO: 170 10*3/MM3 (ref 140–450)
PMV BLD AUTO: 10.5 FL (ref 6–12)
RBC # BLD AUTO: 3.83 10*6/MM3 (ref 3.77–5.28)
WBC # BLD AUTO: 7.43 10*3/MM3 (ref 3.4–10.8)

## 2021-03-30 PROCEDURE — 25010000002 IMMUNE GLOBULIN (HUMAN) 30 GM/300ML SOLUTION: Performed by: INTERNAL MEDICINE

## 2021-03-30 PROCEDURE — 36415 COLL VENOUS BLD VENIPUNCTURE: CPT

## 2021-03-30 PROCEDURE — 96365 THER/PROPH/DIAG IV INF INIT: CPT

## 2021-03-30 PROCEDURE — 99214 OFFICE O/P EST MOD 30 MIN: CPT | Performed by: INTERNAL MEDICINE

## 2021-03-30 PROCEDURE — 85025 COMPLETE CBC W/AUTO DIFF WBC: CPT

## 2021-03-30 PROCEDURE — 82784 ASSAY IGA/IGD/IGG/IGM EACH: CPT | Performed by: INTERNAL MEDICINE

## 2021-03-30 PROCEDURE — 96366 THER/PROPH/DIAG IV INF ADDON: CPT

## 2021-03-30 RX ORDER — SODIUM CHLORIDE 9 MG/ML
250 INJECTION, SOLUTION INTRAVENOUS ONCE
Status: CANCELLED | OUTPATIENT
Start: 2021-03-30

## 2021-03-30 RX ORDER — SODIUM CHLORIDE 9 MG/ML
250 INJECTION, SOLUTION INTRAVENOUS ONCE
Status: COMPLETED | OUTPATIENT
Start: 2021-03-30 | End: 2021-03-30

## 2021-03-30 RX ORDER — NITROFURANTOIN 25; 75 MG/1; MG/1
CAPSULE ORAL
COMMUNITY
Start: 2021-01-28 | End: 2022-01-14 | Stop reason: ALTCHOICE

## 2021-03-30 RX ORDER — FAMOTIDINE 10 MG/ML
20 INJECTION, SOLUTION INTRAVENOUS AS NEEDED
Status: CANCELLED | OUTPATIENT
Start: 2021-03-30

## 2021-03-30 RX ORDER — HYDROXYZINE PAMOATE 25 MG/1
25 CAPSULE ORAL ONCE
Status: CANCELLED | OUTPATIENT
Start: 2021-03-30

## 2021-03-30 RX ORDER — ACETAMINOPHEN 325 MG/1
650 TABLET ORAL ONCE
Status: COMPLETED | OUTPATIENT
Start: 2021-03-30 | End: 2021-03-30

## 2021-03-30 RX ORDER — ACETAMINOPHEN 325 MG/1
650 TABLET ORAL ONCE
Status: CANCELLED | OUTPATIENT
Start: 2021-03-30

## 2021-03-30 RX ORDER — HYDROXYZINE PAMOATE 25 MG/1
25 CAPSULE ORAL ONCE
Status: COMPLETED | OUTPATIENT
Start: 2021-03-30 | End: 2021-03-30

## 2021-03-30 RX ORDER — DIPHENHYDRAMINE HYDROCHLORIDE 50 MG/ML
50 INJECTION INTRAMUSCULAR; INTRAVENOUS AS NEEDED
Status: CANCELLED | OUTPATIENT
Start: 2021-03-30

## 2021-03-30 RX ORDER — MEPERIDINE HYDROCHLORIDE 50 MG/ML
25 INJECTION INTRAMUSCULAR; INTRAVENOUS; SUBCUTANEOUS
Status: CANCELLED | OUTPATIENT
Start: 2021-03-30 | End: 2021-03-31

## 2021-03-30 RX ADMIN — HYDROXYZINE PAMOATE 25 MG: 25 CAPSULE ORAL at 11:14

## 2021-03-30 RX ADMIN — IMMUNE GLOBULIN INFUSION (HUMAN) 30 G: 100 INJECTION, SOLUTION INTRAVENOUS; SUBCUTANEOUS at 11:30

## 2021-03-30 RX ADMIN — ACETAMINOPHEN 650 MG: 325 TABLET ORAL at 11:14

## 2021-03-30 RX ADMIN — SODIUM CHLORIDE 250 ML: 9 INJECTION, SOLUTION INTRAVENOUS at 11:14

## 2021-03-30 NOTE — PROGRESS NOTES
Subjective .     REASONS FOR FOLLOW UP:  1. chronic lymphocytic leukemia with recurrent lung infections    Referring MD:    Amarilis Suarez MD    History of Present Illness patient is an.age female with 2014 with stage 0 CLL which is never required treatments.    Over the last year however she has had multiple hospitalizations for lung infections predominantly viral probably also bacterial and at her last hospitalization in October we rechecked her gammaglobulins which were low and opted to start IV IgG monthly to see if this would keep her out of the hospital.    She has continued on monthly IVIG since October.  She states that she feels like a new person this year having had no infections.  She is back to exercising and walking and feels great    Her CBC remained stable and she has no reason to be treated for her CLL  Although her hemoglobin is a little lower than usual and she is on Coumadin so we will have to keep an eye on this    She has had both her coronavirus vaccinations    We will stop her IgG after this month and see how she does through the summer months and then start back again in October unless she has recurrent issues with infection and then will continue year-round    Past Medical History:   Diagnosis Date   • Aortic calcification (CMS/HCC)     mild, on echo 12/17/2017   • Aortic regurgitation     Trace   • Asthma    • Atrial fibrillation (CMS/HCC)    • CAD (coronary artery disease)    • Chronic combined systolic and diastolic congestive heart failure (CMS/HCC)    • CKD (chronic kidney disease) stage 3, GFR 30-59 ml/min (CMS/HCC)    • Coronary artery disease involving native coronary artery of native heart with angina pectoris (CMS/HCC)    • Disc degeneration, lumbar    • DM type 2 (diabetes mellitus, type 2) (CMS/HCC)    • GERD (gastroesophageal reflux disease)    • History of aneurysm     right femoral artery s/p LHC   • History of blood transfusion    • History of fracture    • History of  vitamin D deficiency    • Hyperlipidemia    • Hypertension    • Mild mitral regurgitation    • Mitral annular calcification     12/8/2017- echo, moderate   • PAF (paroxysmal atrial fibrillation) (CMS/HCC)    • Peripheral neuropathy    • Skin cancer     Left hand   • Sleep apnea    • SSS (sick sinus syndrome) (CMS/HCC)    • Stroke (cerebrum) (CMS/HCC)    • Tricuspid regurgitation     Trace       ONCOLOGIC HISTORY:    [facility] HISTORY  Patient is an 85-year-old female whom I had seen in 2014 when she was hospitalized at [facility] and was diagnosed with chronic lymphocytic leukemia.  She has not been to see me in over 4 years and is following with Dr. Suarez her primary care doctor and her white count is gone up a little higher than usual to 22,000 and she is referred back to us for evaluation.  We are doing a telephone visit because of the coronavirus pandemic and her age and susceptibility.    When I originally saw her in 2014 she was brought into the ER unresponsive in septic shock on 03/25/2014 with methicillin-resistant staphylococcus identified in her blood cultures. The patient has been on antibiotic with improvement, but had some residual kidney damage with a creatinine in the 4-range requiring hemodialysis, and was noted on admission to have an elevated white count with lymphocytosis, normal hemoglobin, but a platelet count of 95,000, and over the course of the illness her platelet count normalized and the white count had gone up into the 20,000 range with lymphocytosis. A flow cytometry was sent which is consistent with CLL, she came to our office once or twice and then stopped coming because she was so stable     She tells me now she was hospitalized recently with rhinovirus infection and has had diagnosed with asthma and is having a lot of respiratory issues but does not require oxygen.She is at the [facility] in independent living and managing fairly well    She denies fever sweats or weight loss.  Her last  white count was on 5/26/2020  Showed a white count of 18,000 with 66 lymphs and 27 neutrophils platelet white hemoglobin is 12.6 platelet 188,000    I reassured Christopher that no treatment is indicated for this level of leukocytosis from CLL and if she has no systemic complaints I do not feel strongly about doing CAT scans at her age but I would like to physically examine her in the office in a couple of months to make sure there is no obvious adenopathy or hepatosplenomegaly.    She does have recurrent infections and we can check quantitative immunoglobulins to see how low they are as another adjunctive option to prevent recurrent infections if she has hypogammaglobulinemia    She remains on Coumadin for atrial fibrillation      Current Outpatient Medications on File Prior to Visit   Medication Sig Dispense Refill   • acetaminophen (TYLENOL) 325 MG tablet Take 2 tablets by mouth Every 4 (Four) Hours As Needed for Mild Pain .     • acetylcysteine (MUCOMYST) 20 % nebulizer solution Take 2 mL by nebulization 4 (Four) Times a Day.     • albuterol (PROVENTIL) (2.5 MG/3ML) 0.083% nebulizer solution Take 2.5 mg by nebulization Every 4 (Four) Hours.     • albuterol sulfate  (90 Base) MCG/ACT inhaler Inhale 2 puffs Every 4 (Four) Hours As Needed for Wheezing. 1 inhaler 3   • aspirin 81 MG tablet Take 81 mg by mouth Daily.     • Benralizumab (FASENRA SC) Inject  under the skin into the appropriate area as directed. Gets one shot a month, next shot may 13 then one every 8 weeks     • Calcium Carbonate-Vitamin D (CALCIUM 500 + D PO) Take  by mouth 2 (two) times a day.     • carvedilol (COREG) 3.125 MG tablet TAKE ONE TABLET BY MOUTH TWICE A  tablet 1   • cephalexin (KEFLEX) 500 MG capsule Take 500 mg by mouth Every 8 (Eight) Hours.     • fluticasone (FLONASE) 50 MCG/ACT nasal spray 1 spray into the nostril(s) as directed by provider Daily.     • Fluticasone-Umeclidin-Vilant (Trelegy Ellipta) 100-62.5-25 MCG/INH  aerosol powder  Inhale 1 puff Daily. As directed      • furosemide (LASIX) 20 MG tablet Take 20 mg by mouth Every Morning.     • glipizide (GLUCOTROL XL) 2.5 MG 24 hr tablet      • guaiFENesin (MUCINEX) 600 MG 12 hr tablet Take 1 tablet by mouth Every 12 (Twelve) Hours.     • Loperamide HCl (IMODIUM PO) Take  by mouth Daily.     • LORazepam (ATIVAN) 0.5 MG tablet      • losartan (COZAAR) 25 MG tablet Take 1 tablet by mouth Daily. 30 tablet 11   • melatonin 5 MG tablet tablet Take 5 mg by mouth Every Night.     • metFORMIN (GLUCOPHAGE) 500 MG tablet Take 500 mg by mouth Daily With Breakfast.     • montelukast (SINGULAIR) 10 MG tablet Take 10 mg by mouth Every Night.     • nitrofurantoin, macrocrystal-monohydrate, (MACROBID) 100 MG capsule      • Omega-3 Fatty Acids (FISH OIL) 1000 MG capsule capsule Take 1,000 mg by mouth 2 (Two) Times a Day With Meals.     • oxybutynin XL (DITROPAN-XL) 10 MG 24 hr tablet Take 10 mg by mouth every night at bedtime.     • pantoprazole (PROTONIX) 40 MG EC tablet Take 40 mg by mouth Daily.     • polyethyl glycol-propyl glycol (LUBRICATING EYE DROPS) 0.4-0.3 % solution ophthalmic solution Administer 1 drop to both eyes Every 1 (One) Hour As Needed.     • rosuvastatin (CRESTOR) 20 MG tablet Take 20 mg by mouth Every Night.     • sodium chloride 0.65 % nasal spray 1 spray into the nostril(s) as directed by provider As Needed for Congestion.  12   • Tobramycin 300 MG/4ML nebulizer solution      • trimethoprim (TRIMPEX) 100 MG tablet Take 100 mg by mouth Daily. For UTI prophylaxis     • warfarin (COUMADIN) 4 MG tablet TAKE ONE TABLET BY MOUTH ON MON, WED AND FRI. TAKE ONE-HALF TABLET ON ALL OTHER DAYS Or as directed by [facility] 70 tablet 1     No current facility-administered medications on file prior to visit.       ALLERGIES:     Allergies   Allergen Reactions   • Accupril [Quinapril Hcl] Swelling, Other (See Comments), GI Intolerance and Delirium     HA, constipation    •  Ahist [Chlorpheniramine] Nausea Only, Other (See Comments) and Dizziness     Headache, Blurred vision   • Clarithromycin Nausea Only, Other (See Comments) and Mental Status Change     HA, Depression, Flushing   • Esomeprazole GI Intolerance   • Levalbuterol Swelling   • Levocetirizine Diarrhea and GI Intolerance   • Lipitor [Atorvastatin] Other (See Comments) and Myalgia     Dark urine   • Omeprazole Nausea Only and Other (See Comments)     HA   • Pravachol [Pravastatin] Nausea Only and GI Intolerance     Bloated, Constipation, HA   • Sulindac Other (See Comments) and Myalgia     HA, joint pain, bruising   • Valdecoxib Irritability   • Chlorcyclizine Unknown - Low Severity   • Diclofenac Sodium Unknown - Low Severity   • Diphenhydramine Unknown - Low Severity   • Lodine [Etodolac] Unknown - Low Severity   • Sulfa Antibiotics Unknown - Low Severity       Social History     Socioeconomic History   • Marital status: Single     Spouse name: Not on file   • Number of children: Not on file   • Years of education: Not on file   • Highest education level: Not on file   Tobacco Use   • Smoking status: Never Smoker   • Smokeless tobacco: Never Used   • Tobacco comment: caffeine use- soda   Substance and Sexual Activity   • Alcohol use: Never   • Drug use: No   • Sexual activity: Defer         Cancer-related family history includes Colon cancer in her sister.     I have reviewed the patient's medical history in detail and updated the computerized patient record.    Review of Systems   Constitutional: Negative for appetite change, chills, diaphoresis, fatigue, fever and unexpected weight change.   HENT: Negative for mouth sores and sore throat.    Eyes: Negative for visual disturbance.   Respiratory: Positive for shortness of breath (stable). Negative for cough and wheezing.    Gastrointestinal: Negative.  Negative for abdominal pain, diarrhea, nausea and vomiting.   Genitourinary: Negative.  Negative for dysuria and frequency.    Musculoskeletal: Positive for back pain.   Neurological: Negative.  Negative for dizziness and weakness.   Hematological: Does not bruise/bleed easily.   Psychiatric/Behavioral: Negative.  The patient is not nervous/anxious.    All other systems reviewed and are negative.  I have reviewed and confirmed the accuracy of the ROS as documented by the MA/LPN/RN Lucien Larkin MD      Objective      Vitals:    03/30/21 1013   BP: 131/88   Pulse: 89   Resp: 16   Temp: 98 °F (36.7 °C)   SpO2: 99%     Current Status 3/30/2021   ECOG score 2     Pain Score    03/30/21 1013   PainSc: 0-No pain     Patient screened positive for depression based on a PHQ-9 score of 0 on 3/2/2021. This patient's ACP documentation is up to date, and there's nothing further left to document.      CONSTITUTIONAL:  Vital signs reviewed.  No distress, looks comfortable.  EYES:  Conjunctiva and lids unremarkable.  PERRLA  EARS,NOSE,MOUTH,THROAT:  Ears and nose appear unremarkable.  Lips, teeth, gums appear unremarkable.  RESPIRATORY:  Normal respiratory effort.  Lungs clear to auscultation on the right decreased breath breath sounds lower half of the left lung  CARDIOVASCULAR:  Normal S1, S2.  No murmurs rubs or gallops.  No significant lower extremity edema.  GASTROINTESTINAL: Abdomen appears unremarkable.  Nontender.  No hepatomegaly.  No splenomegaly.  LYMPHATIC:  No cervical, supraclavicular, axillary lymphadenopathy.  SKIN:  Warm.  No rashes.  PSYCHIATRIC:  Normal judgment and insight.  Normal mood and affect.       I have reexamined the patient 03/30/2021 and the results are consistent with the previously documented exam. Lucien Larkin MD       RECENT LABS:  Results from last 7 days   Lab Units 03/30/21  0955   WBC 10*3/mm3 7.43   NEUTROS ABS 10*3/mm3 3.57   HEMOGLOBIN g/dL 10.9*   HEMATOCRIT % 35.8   PLATELETS 10*3/mm3 170         Results from last 7 days   Lab Units 03/24/21  0000   INR  2.10            Assessment/Plan    1.  Chronic lymphocytic leukemia  -stage 0 since 2014 untreated    2.  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  The patient continues to require IVIG replacement.  IgG is pending today.    3.  COPD/asthma /emphysema on home O2-recurrent respiratory infections with RSV and rhinovirus and bacteria    4.  Atrial fibrillation on Coumadin    5.  Hypertension/hypercholesterolemia/type 2 diabetes on treatment    6.  Vaccinated against coronavirus    7.  Mild anemia-observe for now    Plan  1. IV IgG today and then stop for the summer  2.  See the nurse practitioner in 3 months CBC and assess anemia and infections  3.  MD follow-up with Dr. Larkin in 6 months to resume IVIG at that time.  4.  No treatment indicated for CLL at this time        Lucien Larkin MD

## 2021-03-30 NOTE — PROGRESS NOTES
Patient is tolerating IVIG without difficulty. Verbal order per Dr. PERALTA to continue IVIG as ordered.

## 2021-03-31 ENCOUNTER — ANTICOAGULATION VISIT (OUTPATIENT)
Dept: PHARMACY | Facility: HOSPITAL | Age: 86
End: 2021-03-31

## 2021-03-31 DIAGNOSIS — I48.0 PAROXYSMAL ATRIAL FIBRILLATION (HCC): ICD-10-CM

## 2021-03-31 LAB — INR PPP: 2.8

## 2021-03-31 NOTE — PROGRESS NOTES
Anticoagulation Clinic Progress Note    Anticoagulation Summary  As of 3/31/2021    INR goal:  2.0-3.0   TTR:  69.6 % (2.3 y)   INR used for dosin.80 (3/31/2021)   Warfarin maintenance plan:  4 mg every day   Weekly warfarin total:  28 mg   Plan last modified:  Alexander Valiente RPH (3/24/2021)   Next INR check:  2021   Priority:  Maintenance   Target end date:  Indefinite    Indications    Paroxysmal atrial fibrillation (CMS/HCC) [I48.0]             Anticoagulation Episode Summary     INR check location:      Preferred lab:      Send INR reminders to:  [facility] CLINICAL POOL    Comments:  [facility] beginning 20      Anticoagulation Care Providers     Provider Role Specialty Phone number    Jonah Regalado Jr., MD Referring Cardiology [phone]          INR History:  Anticoagulation Monitoring 3/19/2021 3/24/2021 3/31/2021   INR 1.40 2.10 2.80   INR Date 3/19/2021 3/24/2021 3/31/2021   INR Goal 2.0-3.0 2.0-3.0 2.0-3.0   Trend Same Up Same   Last Week Total 22 mg 28 mg 30 mg   Next Week Total 28 mg 28 mg 28 mg   Sun 4 mg (3/21) 4 mg 4 mg   Mon 4 mg 4 mg 4 mg   Tue 4 mg (3/23) 4 mg 4 mg   Wed - 4 mg 4 mg   Thu - 4 mg 4 mg   Fri 6 mg (3/19) 4 mg 4 mg   Sat 4 mg 4 mg 4 mg   Visit Report - - -   Some recent data might be hidden       Plan:  1. INR is Therapeutic today- see above in Anticoagulation Summary.   Will instruct Caitlyn GARRETT Longoria to Continue their warfarin regimen- see above in Anticoagulation Summary.  2. Follow up in 2 weeks  3. Spoke with pt today. They have been instructed to call if any changes in medications, doses, concerns, etc. Patient expresses understanding and has no further questions at this time.    Pao Levi [facility]

## 2021-04-14 ENCOUNTER — ANTICOAGULATION VISIT (OUTPATIENT)
Dept: PHARMACY | Facility: HOSPITAL | Age: 86
End: 2021-04-14

## 2021-04-14 DIAGNOSIS — I48.0 PAROXYSMAL ATRIAL FIBRILLATION (HCC): Primary | ICD-10-CM

## 2021-04-14 LAB — INR PPP: 1.7

## 2021-04-14 NOTE — PROGRESS NOTES
Anticoagulation Clinic Progress Note    Anticoagulation Summary  As of 2021    INR goal:  2.0-3.0   TTR:  69.6 % (2.3 y)   INR used for dosin.70 (2021)   Warfarin maintenance plan:  6 mg every Wed; 4 mg all other days   Weekly warfarin total:  30 mg   Plan last modified:  Alexander Valiente RPH (2021)   Next INR check:  2021   Priority:  Maintenance   Target end date:  Indefinite    Indications    Paroxysmal atrial fibrillation (CMS/HCC) [I48.0]             Anticoagulation Episode Summary     INR check location:      Preferred lab:      Send INR reminders to:   [facility] CLINICAL POOL    Comments:  [facility] beginning 20      Anticoagulation Care Providers     Provider Role Specialty Phone number    Jonah Regalado Jr., MD Referring Cardiology [phone]          Clinic Interview:  Patient Findings     Positives:  Change in diet/appetite    Negatives:  Signs/symptoms of thrombosis, Signs/symptoms of bleeding,   Laboratory test error suspected, Change in health, Change in alcohol use,   Change in activity, Upcoming invasive procedure, Emergency department   visit, Upcoming dental procedure, Missed doses, Extra doses, Change in   medications, Hospital admission, Bruising, Other complaints    Comments:  Reports a couple salads in past week.      Clinical Outcomes     Negatives:  Major bleeding event, Thromboembolic event,   Anticoagulation-related hospital admission, Anticoagulation-related ED   visit, Anticoagulation-related fatality    Comments:  Reports a couple salads in past week.        INR History:  Anticoagulation Monitoring 3/24/2021 3/31/2021 2021   INR 2.10 2.80 1.70   INR Date 3/24/2021 3/31/2021 2021   INR Goal 2.0-3.0 2.0-3.0 2.0-3.0   Trend Up Same Up   Last Week Total 28 mg 30 mg 28 mg   Next Week Total 28 mg 28 mg 30 mg   Sun 4 mg 4 mg 4 mg   Mon 4 mg 4 mg 4 mg   Tue 4 mg 4 mg 4 mg   Wed 4 mg 4 mg 6 mg   Thu 4 mg 4 mg 4 mg   Fri 4 mg 4 mg 4 mg   Sat 4 mg  4 mg 4 mg   Visit Report - - -   Some recent data might be hidden       Plan:  1. INR is Subtherapeutic today- see above in Anticoagulation Summary.   Will instruct Caitlyn Longoria to Increase their warfarin regimen- see above in Anticoagulation Summary.  2. Follow up in 2 weeks  3. They have been instructed to call if any changes in medications, doses, concerns, etc. Patient expresses understanding and has no further questions at this time.    Alexander Valiente RP

## 2021-04-28 ENCOUNTER — ANTICOAGULATION VISIT (OUTPATIENT)
Dept: PHARMACY | Facility: HOSPITAL | Age: 86
End: 2021-04-28

## 2021-04-28 DIAGNOSIS — I48.0 PAROXYSMAL ATRIAL FIBRILLATION (HCC): Primary | ICD-10-CM

## 2021-04-28 LAB
INR PPP: 1.9
INR PPP: 1.9

## 2021-04-28 NOTE — PROGRESS NOTES
Anticoagulation Clinic Progress Note    Anticoagulation Summary  As of 2021    INR goal:  2.0-3.0   TTR:  68.5 % (2.4 y)   INR used for dosin.90 (2021)   Warfarin maintenance plan:  6 mg every Wed; 4 mg all other days   Weekly warfarin total:  30 mg   No change documented:  Saleem Redmond [facility]   Plan last modified:  Alexander Valiente RPH (2021)   Next INR check:  2021   Priority:  Maintenance   Target end date:  Indefinite    Indications    Paroxysmal atrial fibrillation (CMS/[facility]) [I48.0]             Anticoagulation Episode Summary     INR check location:      Preferred lab:      [facility]    Comments:  [facility] beginning 20      Anticoagulation Care Providers     Provider Role Specialty Phone number    Jonah Regalado Jr., MD Referring Cardiology [phone]          Clinic Interview:  Patient Findings     Positives:  Change in medications    Negatives:  Signs/symptoms of thrombosis, Signs/symptoms of bleeding,   Laboratory test error suspected, Change in health, Change in alcohol use,   Change in activity, Upcoming invasive procedure, Emergency department   visit, Upcoming dental procedure, Missed doses, Extra doses, Change in   diet/appetite, Hospital admission, Bruising, Other complaints    Comments:  Pt to start on new abx; not sure of name       Clinical Outcomes     Negatives:  Major bleeding event, Thromboembolic event,   Anticoagulation-related hospital admission, Anticoagulation-related ED   visit, Anticoagulation-related fatality    Comments:  Pt to start on new abx; not sure of name         INR History:  Anticoagulation Monitoring 3/31/2021 2021 2021   INR 2.80 1.70 1.90   INR Date 3/31/2021 2021 2021   INR Goal 2.0-3.0 2.0-3.0 2.0-3.0   Trend Same Up Same   Last Week Total 30 mg 28 mg 30 mg   Next Week Total 28 mg 30 mg 30 mg   Sun 4 mg 4 mg 4 mg   Mon 4 mg 4 mg 4 mg   Tue 4 mg 4 mg 4 mg   Wed 4 mg 6 mg 6  mg   Thu 4 mg 4 mg 4 mg   Fri 4 mg 4 mg 4 mg   Sat 4 mg 4 mg 4 mg   Visit Report - - -   Some recent data might be hidden       Plan:  1. INR is Subtherapeutic today- see above in Anticoagulation Summary.   Will instruct Caitlyn Longoria to Continue their warfarin regimen- see above in Anticoagulation Summary.  2. Follow up in 1 week  3.  Pt has agreed to only be called if INR out of range. They have been instructed to call if any changes in medications, doses, concerns, etc. Patient expresses understanding and has no further questions at this time.    Saleem Redmond, [facility]

## 2021-04-29 ENCOUNTER — ANTICOAGULATION VISIT (OUTPATIENT)
Dept: PHARMACY | Facility: HOSPITAL | Age: 86
End: 2021-04-29

## 2021-04-29 DIAGNOSIS — I48.0 PAROXYSMAL ATRIAL FIBRILLATION (HCC): Primary | ICD-10-CM

## 2021-04-29 NOTE — PROGRESS NOTES
Anticoagulation Clinic Progress Note  Anticoagulation Summary  As of 4/29/2021   The patient was not given dosing instructions in this encounter.       Plan:  [facility] contacted the [facility] again on 4/29 with 4/28's INR.  I contacted patient 4/29 who noted the 4/28 Warfarin plan (see 4/28 note) and had no further questions or problems at this time.  Plan to follow up with INR 5/5/21 as already scheduled    Kayode Lane, JoseluisD

## 2021-05-05 ENCOUNTER — ANTICOAGULATION VISIT (OUTPATIENT)
Dept: PHARMACY | Facility: HOSPITAL | Age: 86
End: 2021-05-05

## 2021-05-05 DIAGNOSIS — I48.0 PAROXYSMAL ATRIAL FIBRILLATION (HCC): Primary | ICD-10-CM

## 2021-05-05 LAB — INR PPP: 2.4

## 2021-05-05 NOTE — PROGRESS NOTES
Anticoagulation Clinic Progress Note    Anticoagulation Summary  As of 2021    INR goal:  2.0-3.0   TTR:  68.6 % (2.4 y)   INR used for dosin.40 (2021)   Warfarin maintenance plan:  6 mg every Wed; 4 mg all other days   Weekly warfarin total:  30 mg   No change documented:  Alexander Valiente RPH   Plan last modified:  Alexander Valiente RPH (2021)   Next INR check:  2021   Priority:  Maintenance   Target end date:  Indefinite    Indications    Paroxysmal atrial fibrillation (CMS/[facility]) [I48.0]             Anticoagulation Episode Summary     INR check location:      Preferred lab:      [facility]    [facility]      Anticoagulation Care Providers     Provider Role Specialty Phone number    Jonah Regalado Jr., MD Referring Cardiology [phone]          Clinic Interview:  Patient Findings     Positives:  Change in medications    Negatives:  Signs/symptoms of thrombosis, Signs/symptoms of bleeding,   Laboratory test error suspected, Change in health, Change in alcohol use,   Change in activity, Upcoming invasive procedure, Emergency department   visit, Upcoming dental procedure, Missed doses, Extra doses, Change in   diet/appetite, Hospital admission, Bruising, Other complaints    Comments:  Now on nitrofurantoin for UTI prophylaxis.      Clinical Outcomes     Negatives:  Major bleeding event, Thromboembolic event,   Anticoagulation-related hospital admission, Anticoagulation-related ED   visit, Anticoagulation-related fatality    Comments:  Now on nitrofurantoin for UTI prophylaxis.          INR History:  Anticoagulation Monitoring 2021   INR 1.70 1.90 - 2.40   INR Date 2021 - 2021   INR Goal 2.0-3.0 2.0-3.0 2.0-3.0 2.0-3.0   Trend Up Same - Same   Last Week Total 28 mg 30 mg - 30 mg   Next Week Total 30 mg 30 mg - 30 mg   Sun 4 mg 4 mg - 4 mg   Mon 4 mg 4 mg - 4 mg   Tue 4 mg  4 mg - 4 mg   Wed 6 mg 6 mg - 6 mg   Thu 4 mg 4 mg - 4 mg   Fri 4 mg 4 mg - 4 mg   Sat 4 mg 4 mg - 4 mg   Visit Report - - - -   Some recent data might be hidden       Plan:  1. INR is Therapeutic today- see above in Anticoagulation Summary.   Will instruct Caitlyn Longoria to Continue their warfarin regimen- see above in Anticoagulation Summary.  2. Follow up in 2 weeks  3. They have been instructed to call if any changes in medications, doses, concerns, etc. Patient expresses understanding and has no further questions at this time.    Alexander Valiente, [facility]

## 2021-05-10 RX ORDER — CEPHALEXIN 500 MG/1
CAPSULE ORAL
Qty: 270 CAPSULE | Refills: 2 | Status: SHIPPED | OUTPATIENT
Start: 2021-05-10 | End: 2021-06-22

## 2021-05-17 ENCOUNTER — ANTICOAGULATION VISIT (OUTPATIENT)
Dept: PHARMACY | Facility: HOSPITAL | Age: 86
End: 2021-05-17

## 2021-05-17 DIAGNOSIS — I48.0 PAROXYSMAL ATRIAL FIBRILLATION (HCC): Primary | ICD-10-CM

## 2021-05-17 LAB — INR PPP: 2.2

## 2021-05-17 NOTE — PROGRESS NOTES
Anticoagulation Clinic Progress Note    Anticoagulation Summary  As of 2021    INR goal:  2.0-3.0   TTR:  69.0 % (2.4 y)   INR used for dosin.20 (2021)   Warfarin maintenance plan:  6 mg every Wed; 4 mg all other days   Weekly warfarin total:  30 mg   No change documented:  Alexander Valiente RPH   Plan last modified:  Alexander Valiente RPH (2021)   Next INR check:  2021   Priority:  Maintenance   Target end date:  Indefinite    Indications    Paroxysmal atrial fibrillation (CMS/[facility]) [I48.0]             Anticoagulation Episode Summary     INR check location:      Preferred lab:      Send INR reminders to:   JACEY SINCLAIR CLINICAL POOL    Comments:  [facility] Home lab beginning 20      Anticoagulation Care Providers     Provider Role Specialty Phone number    Jonah Regalado Jr., MD Referring Cardiology [phone]          Clinic Interview:  Patient Findings     Negatives:  Signs/symptoms of thrombosis, Signs/symptoms of bleeding,   Laboratory test error suspected, Change in health, Change in alcohol use,   Change in activity, Upcoming invasive procedure, Emergency department   visit, Upcoming dental procedure, Missed doses, Extra doses, Change in   medications, Change in diet/appetite, Hospital admission, Bruising, Other   complaints      Clinical Outcomes     Negatives:  Major bleeding event, Thromboembolic event,   Anticoagulation-related hospital admission, Anticoagulation-related ED   visit, Anticoagulation-related fatality        INR History:  Anticoagulation Monitoring 2021   INR 1.90 - 2.40 2.20   INR Date 2021 - 2021   INR Goal 2.0-3.0 2.0-3.0 2.0-3.0 2.0-3.0   Trend Same - Same Same   Last Week Total 30 mg - 30 mg 30 mg   Next Week Total 30 mg - 30 mg 30 mg   Sun 4 mg - 4 mg 4 mg   Mon 4 mg - 4 mg 4 mg   Tue 4 mg - 4 mg 4 mg   Wed 6 mg - 6 mg 6 mg   Thu 4 mg - 4 mg 4 mg   Fri 4 mg - 4 mg 4 mg   Sat 4 mg - 4 mg 4 mg   Visit Report - -  - -   Some recent data might be hidden       Plan:  1. INR is Therapeutic today- see above in Anticoagulation Summary.   Will instruct Caitlyn Longoria to Continue their warfarin regimen- see above in Anticoagulation Summary.  2. Follow up in 4 weeks  3. They have been instructed to call if any changes in medications, doses, concerns, etc. Patient expresses understanding and has no further questions at this time.    Alexander Valiente [facility]

## 2021-05-27 PROCEDURE — 93296 REM INTERROG EVL PM/IDS: CPT | Performed by: INTERNAL MEDICINE

## 2021-05-27 PROCEDURE — 93294 REM INTERROG EVL PM/LDLS PM: CPT | Performed by: INTERNAL MEDICINE

## 2021-05-31 ENCOUNTER — TELEPHONE (OUTPATIENT)
Dept: CARDIOLOGY | Facility: CLINIC | Age: 86
End: 2021-05-31

## 2021-05-31 DIAGNOSIS — I48.91 ATRIAL FIBRILLATION AND FLUTTER: ICD-10-CM

## 2021-05-31 DIAGNOSIS — I48.92 ATRIAL FIBRILLATION AND FLUTTER: ICD-10-CM

## 2021-06-01 RX ORDER — CARVEDILOL 3.12 MG/1
TABLET ORAL
Qty: 180 TABLET | Refills: 2 | Status: SHIPPED | OUTPATIENT
Start: 2021-06-01 | End: 2022-03-08

## 2021-06-15 RX ORDER — WARFARIN SODIUM 4 MG/1
TABLET ORAL
Qty: 100 TABLET | Refills: 1 | Status: SHIPPED | OUTPATIENT
Start: 2021-06-15 | End: 2021-12-27

## 2021-06-16 ENCOUNTER — ANTICOAGULATION VISIT (OUTPATIENT)
Dept: PHARMACY | Facility: HOSPITAL | Age: 86
End: 2021-06-16

## 2021-06-16 DIAGNOSIS — I48.0 PAROXYSMAL ATRIAL FIBRILLATION (HCC): Primary | ICD-10-CM

## 2021-06-16 LAB — INR PPP: 4.3

## 2021-06-16 NOTE — PROGRESS NOTES
Anticoagulation Clinic Progress Note    Anticoagulation Summary  As of 2021    INR goal:  2.0-3.0   TTR:  68.0 % (2.5 y)   INR used for dosin.30 (2021)   Warfarin maintenance plan:  6 mg every Wed; 4 mg all other days   Weekly warfarin total:  30 mg   Plan last modified:  Alexander Valiente RPH (2021)   Next INR check:  2021   Priority:  Maintenance   Target end date:  Indefinite    Indications    Paroxysmal atrial fibrillation (CMS/HCC) [I48.0]             Anticoagulation Episode Summary     INR check location:      Preferred lab:      Send INR reminders to:   JACEY SINCLAIR CLINICAL POOL    Comments:  [facility] lab beginning 20      Anticoagulation Care Providers     Provider Role Specialty Phone number    Jonah Regalado Jr., MD Referring Cardiology [phone]          Clinic Interview:  Patient Findings     Positives:  Change in health, Change in medications    Negatives:  Signs/symptoms of thrombosis, Signs/symptoms of bleeding,   Laboratory test error suspected, Change in alcohol use, Change in   activity, Upcoming invasive procedure, Emergency department visit,   Upcoming dental procedure, Missed doses, Extra doses, Change in   diet/appetite, Hospital admission, Bruising, Other complaints    Comments:  Amox/clav x 7 days started on 6/10/21; will finish tomorrow.   Reports this was started after she had a bad cough w/ hemoptysis, which   has since resolved.       Clinical Outcomes     Negatives:  Major bleeding event, Thromboembolic event,   Anticoagulation-related hospital admission, Anticoagulation-related ED   visit, Anticoagulation-related fatality    Comments:  Amox/clav x 7 days started on 6/10/21; will finish tomorrow.   Reports this was started after she had a bad cough w/ hemoptysis, which   has since resolved.         INR History:  Anticoagulation Monitoring 2021   INR 2.40 2.20 4.30   INR Date 2021   INR Goal 2.0-3.0  2.0-3.0 2.0-3.0   Trend Same Same Same   Last Week Total 30 mg 30 mg 30 mg   Next Week Total 30 mg 30 mg 24 mg   Sun 4 mg 4 mg 4 mg   Mon 4 mg 4 mg 4 mg   Tue 4 mg 4 mg 4 mg   Wed 6 mg 6 mg Hold (6/16); Otherwise 6 mg   Thu 4 mg 4 mg 4 mg   Fri 4 mg 4 mg 4 mg   Sat 4 mg 4 mg 4 mg   Visit Report - - -   Some recent data might be hidden       Plan:  1. INR is Supratherapeutic today- see above in Anticoagulation Summary.   Will instruct Caitlyn Longoria to Change their warfarin regimen- see above in Anticoagulation Summary.  2. Follow up in 2 weeks (advised 1 wk; however, pt reports she is unable to visit for INR check again for 2 weeks)  3. They have been instructed to call if any changes in medications, doses, concerns, etc. To seek immediate medical attention if s/sx of bleeding develop. Patient expresses understanding and has no further questions at this time.    Alexander Valiente [facility]

## 2021-06-22 ENCOUNTER — LAB (OUTPATIENT)
Dept: LAB | Facility: HOSPITAL | Age: 86
End: 2021-06-22

## 2021-06-22 ENCOUNTER — APPOINTMENT (OUTPATIENT)
Dept: ONCOLOGY | Facility: HOSPITAL | Age: 86
End: 2021-06-22

## 2021-06-22 ENCOUNTER — OFFICE VISIT (OUTPATIENT)
Dept: ONCOLOGY | Facility: CLINIC | Age: 86
End: 2021-06-22

## 2021-06-22 VITALS
HEART RATE: 92 BPM | TEMPERATURE: 97.5 F | WEIGHT: 208.3 LBS | RESPIRATION RATE: 18 BRPM | BODY MASS INDEX: 36.91 KG/M2 | DIASTOLIC BLOOD PRESSURE: 80 MMHG | SYSTOLIC BLOOD PRESSURE: 126 MMHG | OXYGEN SATURATION: 97 % | HEIGHT: 63 IN

## 2021-06-22 DIAGNOSIS — C91.12 CLL (CHRONIC LYMPHOID LEUKEMIA) IN RELAPSE (HCC): Primary | ICD-10-CM

## 2021-06-22 DIAGNOSIS — D80.1 HYPOGAMMAGLOBULINEMIA (HCC): ICD-10-CM

## 2021-06-22 DIAGNOSIS — C91.12 CLL (CHRONIC LYMPHOID LEUKEMIA) IN RELAPSE (HCC): ICD-10-CM

## 2021-06-22 DIAGNOSIS — Z79.01 ANTICOAGULATED ON COUMADIN: ICD-10-CM

## 2021-06-22 LAB
BASOPHILS # BLD AUTO: 0.02 10*3/MM3 (ref 0–0.2)
BASOPHILS NFR BLD AUTO: 0.2 % (ref 0–1.5)
DEPRECATED RDW RBC AUTO: 56.6 FL (ref 37–54)
EOSINOPHIL # BLD AUTO: 0 10*3/MM3 (ref 0–0.4)
EOSINOPHIL NFR BLD AUTO: 0 % (ref 0.3–6.2)
ERYTHROCYTE [DISTWIDTH] IN BLOOD BY AUTOMATED COUNT: 16.7 % (ref 12.3–15.4)
HCT VFR BLD AUTO: 35.2 % (ref 34–46.6)
HGB BLD-MCNC: 11 G/DL (ref 12–15.9)
IMM GRANULOCYTES # BLD AUTO: 0.06 10*3/MM3 (ref 0–0.05)
IMM GRANULOCYTES NFR BLD AUTO: 0.5 % (ref 0–0.5)
LYMPHOCYTES # BLD AUTO: 4.98 10*3/MM3 (ref 0.7–3.1)
LYMPHOCYTES NFR BLD AUTO: 43.8 % (ref 19.6–45.3)
MCH RBC QN AUTO: 28.7 PG (ref 26.6–33)
MCHC RBC AUTO-ENTMCNC: 31.3 G/DL (ref 31.5–35.7)
MCV RBC AUTO: 91.9 FL (ref 79–97)
MONOCYTES # BLD AUTO: 0.31 10*3/MM3 (ref 0.1–0.9)
MONOCYTES NFR BLD AUTO: 2.7 % (ref 5–12)
NEUTROPHILS NFR BLD AUTO: 52.8 % (ref 42.7–76)
NEUTROPHILS NFR BLD AUTO: 6.01 10*3/MM3 (ref 1.7–7)
NRBC BLD AUTO-RTO: 0 /100 WBC (ref 0–0.2)
PLATELET # BLD AUTO: 170 10*3/MM3 (ref 140–450)
PMV BLD AUTO: 10.2 FL (ref 6–12)
RBC # BLD AUTO: 3.83 10*6/MM3 (ref 3.77–5.28)
WBC # BLD AUTO: 11.38 10*3/MM3 (ref 3.4–10.8)

## 2021-06-22 PROCEDURE — 85025 COMPLETE CBC W/AUTO DIFF WBC: CPT

## 2021-06-22 PROCEDURE — 99213 OFFICE O/P EST LOW 20 MIN: CPT | Performed by: NURSE PRACTITIONER

## 2021-06-22 PROCEDURE — 36415 COLL VENOUS BLD VENIPUNCTURE: CPT

## 2021-06-22 RX ORDER — NYSTATIN 100000 U/G
CREAM TOPICAL
COMMUNITY
Start: 2021-06-14

## 2021-06-22 NOTE — PROGRESS NOTES
Subjective .     REASONS FOR FOLLOW UP:  1. chronic lymphocytic leukemia with recurrent lung infections    Referring MD:    Amarilis Suarez MD    History of Present Illness patient is an.age female with 2014 with stage 0 CLL which is never required treatments.    Over the last year however she has had multiple hospitalizations for lung infections predominantly viral probably also bacterial and at her last hospitalization in October we rechecked her gammaglobulins which were low and opted to start IV IgG monthly to see if this would keep her out of the hospital.    She has continued on monthly IVIG since October through March 2021.  The patient returns today for evaluation and happily reports she has had no infection since her March IVIG.  She reports feeling quite well with walking and doing exercise classes including yoga.  Her body aches are improving as is her energy level.      Past Medical History:   Diagnosis Date   • Aortic calcification (CMS/HCC)     mild, on echo 12/17/2017   • Aortic regurgitation     Trace   • Asthma    • Atrial fibrillation (CMS/HCC)    • CAD (coronary artery disease)    • Chronic combined systolic and diastolic congestive heart failure (CMS/HCC)    • CKD (chronic kidney disease) stage 3, GFR 30-59 ml/min (CMS/HCC)    • Coronary artery disease involving native coronary artery of native heart with angina pectoris (CMS/HCC)    • Disc degeneration, lumbar    • DM type 2 (diabetes mellitus, type 2) (CMS/HCC)    • GERD (gastroesophageal reflux disease)    • History of aneurysm     right femoral artery s/p LHC   • History of blood transfusion    • History of fracture    • History of vitamin D deficiency    • Hyperlipidemia    • Hypertension    • Mild mitral regurgitation    • Mitral annular calcification     12/8/2017- echo, moderate   • PAF (paroxysmal atrial fibrillation) (CMS/HCC)    • Peripheral neuropathy    • Skin cancer     Left hand   • Sleep apnea    • SSS (sick sinus syndrome)  (CMS/HCC)    • Stroke (cerebrum) (CMS/[facility])    • Tricuspid regurgitation     Trace       ONCOLOGIC HISTORY:    [facility] HISTORY  Patient is an 85-year-old female whom I had seen in 2014 when she was hospitalized at [facility] and was diagnosed with chronic lymphocytic leukemia.  She has not been to see me in over 4 years and is following with Dr. Suarez her primary care doctor and her white count is gone up a little higher than usual to 22,000 and she is referred back to us for evaluation.  We are doing a telephone visit because of the coronavirus pandemic and her age and susceptibility.    When I originally saw her in 2014 she was brought into the ER unresponsive in septic shock on 03/25/2014 with methicillin-resistant staphylococcus identified in her blood cultures. The patient has been on antibiotic with improvement, but had some residual kidney damage with a creatinine in the 4-range requiring hemodialysis, and was noted on admission to have an elevated white count with lymphocytosis, normal hemoglobin, but a platelet count of 95,000, and over the course of the illness her platelet count normalized and the white count had gone up into the 20,000 range with lymphocytosis. A flow cytometry was sent which is consistent with CLL, she came to our office once or twice and then stopped coming because she was so stable     She tells me now she was hospitalized recently with rhinovirus infection and has had diagnosed with asthma and is having a lot of respiratory issues but does not require oxygen.She is at the [facility] home in independent living and managing fairly well    She denies fever sweats or weight loss.  Her last white count was on 5/26/2020  Showed a white count of 18,000 with 66 lymphs and 27 neutrophils platelet white hemoglobin is 12.6 platelet 188,000    I reassured Christopher that no treatment is indicated for this level of leukocytosis from CLL and if she has no systemic complaints I do not feel strongly about doing  CAT scans at her age but I would like to physically examine her in the office in a couple of months to make sure there is no obvious adenopathy or hepatosplenomegaly.    She does have recurrent infections and we can check quantitative immunoglobulins to see how low they are as another adjunctive option to prevent recurrent infections if she has hypogammaglobulinemia    She remains on Coumadin for atrial fibrillation      Current Outpatient Medications on File Prior to Visit   Medication Sig Dispense Refill   • acetaminophen (TYLENOL) 325 MG tablet Take 2 tablets by mouth Every 4 (Four) Hours As Needed for Mild Pain .     • acetylcysteine (MUCOMYST) 20 % nebulizer solution Take 2 mL by nebulization 4 (Four) Times a Day.     • albuterol (PROVENTIL) (2.5 MG/3ML) 0.083% nebulizer solution Take 2.5 mg by nebulization Every 4 (Four) Hours.     • albuterol sulfate  (90 Base) MCG/ACT inhaler Inhale 2 puffs Every 4 (Four) Hours As Needed for Wheezing. 1 inhaler 3   • aspirin 81 MG tablet Take 81 mg by mouth Daily.     • Benralizumab (FASENRA SC) Inject  under the skin into the appropriate area as directed. Gets one shot a month, next shot may 13 then one every 8 weeks     • Calcium Carbonate-Vitamin D (CALCIUM 500 + D PO) Take  by mouth 2 (two) times a day.     • carvedilol (COREG) 3.125 MG tablet TAKE ONE TABLET BY MOUTH TWICE A  tablet 2   • fluticasone (FLONASE) 50 MCG/ACT nasal spray 1 spray into the nostril(s) as directed by provider Daily.     • Fluticasone-Umeclidin-Vilant (Trelegy Ellipta) 100-62.5-25 MCG/INH aerosol powder  Inhale 1 puff Daily. As directed      • furosemide (LASIX) 20 MG tablet Take 20 mg by mouth Every Morning.     • glipizide (GLUCOTROL XL) 2.5 MG 24 hr tablet      • guaiFENesin (MUCINEX) 600 MG 12 hr tablet Take 1 tablet by mouth Every 12 (Twelve) Hours.     • Loperamide HCl (IMODIUM PO) Take  by mouth Daily.     • LORazepam (ATIVAN) 0.5 MG tablet      • losartan (COZAAR) 25 MG  tablet Take 1 tablet by mouth Daily. 30 tablet 11   • melatonin 5 MG tablet tablet Take 5 mg by mouth Every Night.     • metFORMIN (GLUCOPHAGE) 500 MG tablet Take 500 mg by mouth Daily With Breakfast.     • montelukast (SINGULAIR) 10 MG tablet Take 10 mg by mouth Every Night.     • nitrofurantoin, macrocrystal-monohydrate, (MACROBID) 100 MG capsule      • nystatin (MYCOSTATIN) 370096 UNIT/GM cream      • Omega-3 Fatty Acids (FISH OIL) 1000 MG capsule capsule Take 1,000 mg by mouth 2 (Two) Times a Day With Meals.     • oxybutynin XL (DITROPAN-XL) 10 MG 24 hr tablet Take 10 mg by mouth every night at bedtime.     • pantoprazole (PROTONIX) 40 MG EC tablet Take 40 mg by mouth Daily.     • polyethyl glycol-propyl glycol (LUBRICATING EYE DROPS) 0.4-0.3 % solution ophthalmic solution Administer 1 drop to both eyes Every 1 (One) Hour As Needed.     • rosuvastatin (CRESTOR) 20 MG tablet Take 20 mg by mouth Every Night.     • sodium chloride 0.65 % nasal spray 1 spray into the nostril(s) as directed by provider As Needed for Congestion.  12   • Tobramycin 300 MG/4ML nebulizer solution      • trimethoprim (TRIMPEX) 100 MG tablet Take 100 mg by mouth Daily. For UTI prophylaxis     • warfarin (COUMADIN) 4 MG tablet Take one and one-half tablets (6 mg) by mouth on Wed, and take one tablet (4 mg) by mouth all other days or as directed. 100 tablet 1   • [DISCONTINUED] cephalexin (KEFLEX) 500 MG capsule TAKE ONE CAPSULE BY MOUTH EVERY 8 HOURS 270 capsule 2     No current facility-administered medications on file prior to visit.       ALLERGIES:     Allergies   Allergen Reactions   • Accupril [Quinapril Hcl] Swelling, Other (See Comments), GI Intolerance and Delirium     HA, constipation    • Ahist [Chlorpheniramine] Nausea Only, Other (See Comments) and Dizziness     Headache, Blurred vision   • Clarithromycin Nausea Only, Other (See Comments) and Mental Status Change     HA, Depression, Flushing   • Esomeprazole GI Intolerance   •  Levalbuterol Swelling   • Levocetirizine Diarrhea and GI Intolerance   • Lipitor [Atorvastatin] Other (See Comments) and Myalgia     Dark urine   • Omeprazole Nausea Only and Other (See Comments)     HA   • Pravachol [Pravastatin] Nausea Only and GI Intolerance     Bloated, Constipation, HA   • Sulindac Other (See Comments) and Myalgia     HA, joint pain, bruising   • Valdecoxib Irritability   • Chlorcyclizine Unknown - Low Severity   • Diclofenac Sodium Unknown - Low Severity   • Diphenhydramine Unknown - Low Severity   • Lodine [Etodolac] Unknown - Low Severity   • Sulfa Antibiotics Unknown - Low Severity       Social History     Socioeconomic History   • Marital status: Single     Spouse name: Not on file   • Number of children: Not on file   • Years of education: Not on file   • Highest education level: Not on file   Tobacco Use   • Smoking status: Never Smoker   • Smokeless tobacco: Never Used   • Tobacco comment: caffeine use- soda   Substance and Sexual Activity   • Alcohol use: Never   • Drug use: No   • Sexual activity: Defer         Cancer-related family history includes Colon cancer in her sister.     I have reviewed the patient's medical history in detail and updated the computerized patient record.    Review of Systems   Constitutional: Negative for appetite change, chills, diaphoresis, fatigue, fever and unexpected weight change.   HENT: Negative for mouth sores and sore throat.    Eyes: Negative for visual disturbance.   Respiratory: Positive for shortness of breath (stable). Negative for cough and wheezing.    Gastrointestinal: Negative.  Negative for abdominal pain, diarrhea, nausea and vomiting.   Genitourinary: Negative.  Negative for dysuria and frequency.   Musculoskeletal: Positive for back pain.   Neurological: Negative.  Negative for dizziness and weakness.   Hematological: Does not bruise/bleed easily.   Psychiatric/Behavioral: Negative.  The patient is not nervous/anxious.    All other  systems reviewed and are negative.  I have reviewed and confirmed the accuracy of the ROS as documented by the MA/LPN/RN KRISTIN Collazo      Objective      Vitals:    06/22/21 1102   BP: 126/80   Pulse: 92   Resp: 18   Temp: 97.5 °F (36.4 °C)   SpO2: 97%     Current Status 6/22/2021   ECOG score 0     Pain Score    06/22/21 1102   PainSc: 0-No pain     Patient screened positive for depression based on a PHQ-9 score of 0 on 3/2/2021. This patient's ACP documentation is up to date, and there's nothing further left to document.      CONSTITUTIONAL:  Vital signs reviewed.  No distress, looks comfortable.  EYES:  Conjunctiva and lids unremarkable.  PERRLA  EARS,NOSE,MOUTH,THROAT: Hearing intact.  Lips, teeth, gums appear unremarkable.  RESPIRATORY:  Normal respiratory effort.  Lungs clear to auscultation on the right decreased breath breath sounds lower half of the left lung  CARDIOVASCULAR:  Normal S1, S2.  No murmurs rubs or gallops.  No significant lower extremity edema.  GASTROINTESTINAL: Abdomen appears unremarkable.  Nontender.  No hepatomegaly.  No splenomegaly.  LYMPHATIC:  No cervical, supraclavicular, axillary lymphadenopathy.  SKIN:  Warm.  No rashes.  PSYCHIATRIC:  Normal judgment and insight.  Normal mood and affect.       I have reexamined the patient 06/22/2021 and the results are consistent with the previously documented exam. KRISTIN Collazo       RECENT LABS:  Results from last 7 days   Lab Units 06/22/21  1106   WBC 10*3/mm3 11.38*   NEUTROS ABS 10*3/mm3 6.01   HEMOGLOBIN g/dL 11.0*   HEMATOCRIT % 35.2   PLATELETS 10*3/mm3 170         Results from last 7 days   Lab Units 06/16/21  0000   INR  4.30            Assessment/Plan    1. Chronic lymphocytic leukemia  -stage 0 since 2014 untreated    2.  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  The patient received IVIG treatments October 2020 through March 2021.  We have held IVIG since  that time and the patient is happy to report no infections aside from some urinary tract infection which she has chronically.  Otherwise she has been feeling very well.    3.  COPD/asthma /emphysema on home O2-recurrent respiratory infections with RSV and rhinovirus and bacteria    4.  Atrial fibrillation on Coumadin    5.  Hypertension/hypercholesterolemia/type 2 diabetes on treatment    6.  Vaccinated against coronavirus    7.  Mild anemia-continued to observe for now    Plan  1.  Follow up with Dr. Larkin in 3 months and will anticipate resuming IVIG at that time.  2.  Patient encouraged to call in the interim for any new infection.  3.  No treatment indicated for CLL at this time        KRISTIN Collazo

## 2021-06-30 ENCOUNTER — ANTICOAGULATION VISIT (OUTPATIENT)
Dept: PHARMACY | Facility: HOSPITAL | Age: 86
End: 2021-06-30

## 2021-06-30 DIAGNOSIS — I48.0 PAROXYSMAL ATRIAL FIBRILLATION (HCC): Primary | ICD-10-CM

## 2021-06-30 LAB — INR PPP: 3

## 2021-06-30 NOTE — PROGRESS NOTES
Anticoagulation Clinic Progress Note    Anticoagulation Summary  As of 6/30/2021    INR goal:  2.0-3.0   TTR:  67.0 % (2.5 y)   INR used for dosing:  3.00 (6/30/2021)   Warfarin maintenance plan:  6 mg every Wed; 4 mg all other days   Weekly warfarin total:  30 mg   Plan last modified:  Alexander Valiente RP (4/14/2021)   Next INR check:  7/14/2021   Priority:  Maintenance   Target end date:  Indefinite    Indications    Paroxysmal atrial fibrillation (CMS/HCC) [I48.0]             Anticoagulation Episode Summary     INR check location:      Preferred lab:      Send INR reminders to:   JACEY SINCLAIR CLINICAL POOL    Comments:  [facility] beginning 4/22/20      Anticoagulation Care Providers     Provider Role Specialty Phone number    Jonah Regalado Jr., MD Referring Cardiology [phone]          Clinic Interview:  Patient Findings     Positives:  Change in medications    Negatives:  Signs/symptoms of thrombosis, Signs/symptoms of bleeding,   Laboratory test error suspected, Change in health, Change in alcohol use,   Change in activity, Upcoming invasive procedure, Emergency department   visit, Upcoming dental procedure, Missed doses, Extra doses, Change in   diet/appetite, Hospital admission, Bruising, Other complaints    Comments:  Off all antibiotics for approximately 10 days.      Clinical Outcomes     Negatives:  Major bleeding event, Thromboembolic event,   Anticoagulation-related hospital admission, Anticoagulation-related ED   visit, Anticoagulation-related fatality    Comments:  Off all antibiotics for approximately 10 days.        INR History:  Anticoagulation Monitoring 5/17/2021 6/16/2021 6/30/2021   INR 2.20 4.30 3.00   INR Date 5/17/2021 6/16/2021 6/30/2021   INR Goal 2.0-3.0 2.0-3.0 2.0-3.0   Trend Same Same Same   Last Week Total 30 mg 30 mg 30 mg   Next Week Total 30 mg 24 mg 30 mg   Sun 4 mg 4 mg 4 mg   Mon 4 mg 4 mg 4 mg   Tue 4 mg 4 mg 4 mg   Wed 6 mg Hold (6/16); Otherwise 6 mg 6 mg   Thu 4  mg 4 mg 4 mg   Fri 4 mg 4 mg 4 mg   Sat 4 mg 4 mg 4 mg   Visit Report - - -   Some recent data might be hidden       Plan:  1. INR is Therapeutic today- see above in Anticoagulation Summary.   Will instruct Caitlyn GARRETT Kesslermaxx to Continue their warfarin regimen- see above in Anticoagulation Summary.  2. Follow up in 2 weeks  3. They have been instructed to call if any changes in medications, doses, concerns, etc. Patient expresses understanding and has no further questions at this time.    Tatum Waldrop [facility]

## 2021-07-14 ENCOUNTER — ANTICOAGULATION VISIT (OUTPATIENT)
Dept: PHARMACY | Facility: HOSPITAL | Age: 86
End: 2021-07-14

## 2021-07-14 ENCOUNTER — OFFICE VISIT (OUTPATIENT)
Dept: CARDIOLOGY | Facility: CLINIC | Age: 86
End: 2021-07-14

## 2021-07-14 VITALS
BODY MASS INDEX: 37.1 KG/M2 | WEIGHT: 209.4 LBS | OXYGEN SATURATION: 98 % | SYSTOLIC BLOOD PRESSURE: 102 MMHG | HEART RATE: 80 BPM | HEIGHT: 63 IN | DIASTOLIC BLOOD PRESSURE: 60 MMHG

## 2021-07-14 DIAGNOSIS — I48.0 PAROXYSMAL ATRIAL FIBRILLATION (HCC): Primary | ICD-10-CM

## 2021-07-14 DIAGNOSIS — I25.10 CORONARY ARTERY DISEASE INVOLVING NATIVE CORONARY ARTERY OF NATIVE HEART WITHOUT ANGINA PECTORIS: Primary | ICD-10-CM

## 2021-07-14 LAB — INR PPP: 2.4

## 2021-07-14 PROCEDURE — 99214 OFFICE O/P EST MOD 30 MIN: CPT | Performed by: INTERNAL MEDICINE

## 2021-07-14 RX ORDER — CEPHALEXIN 500 MG/1
500 CAPSULE ORAL 3 TIMES DAILY
COMMUNITY
End: 2022-02-07

## 2021-07-14 NOTE — PROGRESS NOTES
[facility]      Patient Name: Caitlyn Longoria  :1934  Age: 86 y.o.  Encounter Provider:  Jonah Regalado Jr, MD      Chief Complaint: Follow-up coronary artery disease and atrial fibrillation      HPI  Caitlyn Longoria is a 86 y.o. female with past medical history of hypertension, dyslipidemia, paroxysmal atrial fibrillation, coronary artery disease, CVA, chronic systolic and diastolic heart failure, ischemic cardiomyopathy, CKD and diabetes who presents for routine follow-up of chronic medical issues.  She was previously followed by Dr. Veliz and presents to me for the first time today.    Cardiac history includes anterior myocardial infarction.  In  cardiac catheterization performed showing total occlusion of the mid LAD, severe disease of the large diagonal and a significant disease of the RCA and left circumflex.  Angioplasty and drug-eluting stent placed in the diagonal.  EF at that time was 35%.  Complication of the procedure was right femoral pseudoaneurysm which was surgically repaired.  Follow-up echocardiogram in  showed recovery of LV function.    In  she developed severe symptomatic bradycardia and a dual-chamber pacemaker was placed.  She underwent atrial flutter ablation in the same year.    Due to recurrent symptoms she had another cardiac catheterization in  she was found to have in-stent restenosis of the diagonal vessel with  of the Saunders mid LAD.  She had repeat PCI to the diagonal vessel.  Right heart catheterization showed PA systolic pressure of 53 mmHg.  She presented with heart failure in  when echocardiogram showed EF of 23% with grade 1 diastolic dysfunction calcific valvular disease with no significant stenosis or regurgitation.  After being placed back on GDMT improved EF found on echocardiogram in  and 35 to 40%.  Since then she has developed worsening reactive airway disease and is currently on 2 to 3 L of home  "oxygen.    She presents today for routine follow-up.  She is living in an independent living facility and is doing very well.  She states that she is able to get around with her walker and has been weaning oxygen.  She denies chest pain or palpitations.  No dizziness syncope.  No orthopnea or PND.  She has chronic stable lower extremity edema secondary to venous insufficiency.  Social and family history reviewed and not pertinent to this clinic visit.          The following portions of the patient's history were reviewed and updated as appropriate: allergies, current medications, past family history, past medical history, past social history, past surgical history and problem list.      Review of Systems   Constitutional: Negative for chills and fever.   HENT: Negative for hoarse voice and sore throat.    Eyes: Negative for double vision and photophobia.   Cardiovascular: Positive for dyspnea on exertion. Negative for chest pain, leg swelling, near-syncope, orthopnea, palpitations, paroxysmal nocturnal dyspnea and syncope.   Respiratory: Positive for shortness of breath. Negative for cough and wheezing.    Skin: Negative for poor wound healing and rash.   Musculoskeletal: Negative for arthritis and joint swelling.   Gastrointestinal: Negative for bloating, abdominal pain, hematemesis and hematochezia.   Neurological: Negative for dizziness and focal weakness.   Psychiatric/Behavioral: Negative for depression and suicidal ideas.       OBJECTIVE:   Vital Signs  Vitals:    07/14/21 1148   BP: 102/60   Pulse: 80   SpO2: 98%     Estimated body mass index is 37.09 kg/m² as calculated from the following:    Height as of this encounter: 160 cm (63\").    Weight as of this encounter: 95 kg (209 lb 6.4 oz).    Vitals reviewed.   Constitutional:       Appearance: Not in distress. Chronically ill-appearing.   Neck:      Vascular: No JVR. JVD normal.   Pulmonary:      Effort: Pulmonary effort is normal.      Breath sounds: No " wheezing. No rhonchi. No rales.   Chest:      Chest wall: Not tender to palpatation.   Cardiovascular:      PMI at left midclavicular line. Normal rate. Regular rhythm. Normal S1. Normal S2.      Murmurs: There is a systolic murmur.      No gallop. No click. No rub.   Pulses:     Intact distal pulses.   Edema:     Peripheral edema present.  Abdominal:      General: Bowel sounds are normal.      Palpations: Abdomen is soft.      Tenderness: There is no abdominal tenderness.   Musculoskeletal: Normal range of motion.         General: No tenderness. Skin:     General: Skin is warm and dry.   Neurological:      General: No focal deficit present.      Mental Status: Alert and oriented to person, place and time.         Procedures          ASSESSMENT:     Coronary artery disease  Ischemic cardiomyopathy  Atrial fibrillation  Sick sinus syndrome  History of CVA  Diabetes  Dyslipidemia  Obstructive sleep apnea  Pulmonary hypertension  CKD    PLAN OF CARE:     1. Coronary artery disease -patient is doing well and increasing activity without limiting symptoms.  Continue aspirin, beta-blocker, ARB and statin.  LDL is at goal.  2. Ischemic cardiomyopathy -patient is relatively euvolemic at this time.  Continue low-dose Lasix.  Continue carvedilol and losartan.  Patient does not meet criteria for ICD.  3. Atrial fibrillation/flutter -continue beta-blocker and warfarin.  INR 2.4 today.  No bleeding complications.  4. Sick sinus syndrome -adequate device function on interrogation May 2021  5. History of CVA -past history of atrial arrhythmia with currently on anticoagulation with therapeutic INR  6. Chronic respiratory failure -weaning oxygen as tolerated.  Follows with Dr. Coburn.  7. CHARLENE  8. Pulmonary hypertension  9. CKD  10. Dyslipidemia  11. Diabetes    Return to clinic 6 months             Discharge Medications          Accurate as of July 14, 2021 11:51 AM. If you have any questions, ask your nurse or doctor.             Continue These Medications      Instructions Start Date   acetaminophen 325 MG tablet  Commonly known as: TYLENOL   650 mg, Oral, Every 4 Hours PRN      acetylcysteine 20 % nebulizer solution  Commonly known as: MUCOMYST   2 mL, Nebulization, 4 Times Daily - RT      albuterol (2.5 MG/3ML) 0.083% nebulizer solution  Commonly known as: PROVENTIL   2.5 mg, Nebulization, Every 4 Hours      albuterol sulfate  (90 Base) MCG/ACT inhaler  Commonly known as: PROVENTIL HFA;VENTOLIN HFA;PROAIR HFA   2 puffs, Inhalation, Every 4 Hours PRN      aspirin 81 MG tablet   81 mg, Oral, Daily      CALCIUM 500 + D PO   Oral, 2 times daily      carvedilol 3.125 MG tablet  Commonly known as: COREG   TAKE ONE TABLET BY MOUTH TWICE A DAY      cephalexin 500 MG capsule  Commonly known as: KEFLEX   500 mg, Oral, 3 Times Daily      FASENRA SC   Subcutaneous, Gets one shot a month, next shot may 13 then one every 8 weeks       fish oil 1000 MG capsule capsule   1,000 mg, Oral, 2 Times Daily With Meals      fluticasone 50 MCG/ACT nasal spray  Commonly known as: FLONASE   1 spray, Nasal, Daily      furosemide 20 MG tablet  Commonly known as: LASIX   20 mg, Oral, Every Morning      glipizide 2.5 MG 24 hr tablet  Commonly known as: GLUCOTROL XL   No dose, route, or frequency recorded.      guaiFENesin 600 MG 12 hr tablet  Commonly known as: MUCINEX   600 mg, Oral, Every 12 Hours Scheduled      IMODIUM PO   Oral, Daily      LORazepam 0.5 MG tablet  Commonly known as: ATIVAN   No dose, route, or frequency recorded.      losartan 25 MG tablet  Commonly known as: COZAAR   25 mg, Oral, Daily      Lubricating Eye Drops 0.4-0.3 % solution ophthalmic solution  Generic drug: polyethyl glycol-propyl glycol   1 drop, Both Eyes, Every 1 Hour PRN      melatonin 5 MG tablet tablet   5 mg, Oral, Nightly      metFORMIN 500 MG tablet  Commonly known as: GLUCOPHAGE   500 mg, Oral, Daily With Breakfast      montelukast 10 MG tablet  Commonly known as:  SINGULAIR   10 mg, Oral, Nightly      nitrofurantoin (macrocrystal-monohydrate) 100 MG capsule  Commonly known as: MACROBID   No dose, route, or frequency recorded.      nystatin 577915 UNIT/GM cream  Commonly known as: MYCOSTATIN   No dose, route, or frequency recorded.      oxybutynin XL 10 MG 24 hr tablet  Commonly known as: DITROPAN-XL   10 mg, Oral, Every Night at Bedtime      pantoprazole 40 MG EC tablet  Commonly known as: PROTONIX   40 mg, Oral, Daily      rosuvastatin 20 MG tablet  Commonly known as: CRESTOR   20 mg, Oral, Nightly      sodium chloride 0.65 % nasal spray   1 spray, Nasal, As Needed      Tobramycin 300 MG/4ML nebulizer solution   No dose, route, or frequency recorded.      Trelegy Ellipta 100-62.5-25 MCG/INH inhaler  Generic drug: Fluticasone-Umeclidin-Vilant   1 puff, Inhalation, Daily, As directed      trimethoprim 100 MG tablet  Commonly known as: TRIMPEX   100 mg, Oral, Daily, For UTI prophylaxis       warfarin 4 MG tablet  Commonly known as: COUMADIN   Take one and one-half tablets (6 mg) by mouth on Wed, and take one tablet (4 mg) by mouth all other days or as directed.             Thank you for allowing me to participate in the care of your patient,      Sincerely,   Jonah Regalado MD  [facility]  07/14/21  11:51 EDT

## 2021-07-14 NOTE — PROGRESS NOTES
Anticoagulation Clinic Progress Note    Anticoagulation Summary  As of 2021    INR goal:  2.0-3.0   TTR:  67.5 % (2.6 y)   INR used for dosin.40 (2021)   Warfarin maintenance plan:  6 mg every Wed; 4 mg all other days   Weekly warfarin total:  30 mg   No change documented:  Alexander Valiente RPH   Plan last modified:  Alexander Valiente RPH (2021)   Next INR check:  2021   Priority:  Maintenance   Target end date:  Indefinite    Indications    Paroxysmal atrial fibrillation (CMS/[facility]) [I48.0]             Anticoagulation Episode Summary     INR check location:      Preferred lab:      Send INR reminders to:   [facility]CLA[facility] CLINICAL POOL    Comments:  [facility]      Anticoagulation Care Providers     Provider Role Specialty Phone number    Jonah Regalado Jr., MD Referring Cardiology [phone]          Clinic Interview:  Patient Findings     Negatives:  Signs/symptoms of thrombosis, Signs/symptoms of bleeding,   Laboratory test error suspected, Change in health, Change in alcohol use,   Change in activity, Upcoming invasive procedure, Emergency department   visit, Upcoming dental procedure, Missed doses, Extra doses, Change in   medications, Change in diet/appetite, Hospital admission, Bruising, Other   complaints      Clinical Outcomes     Negatives:  Major bleeding event, Thromboembolic event,   Anticoagulation-related hospital admission, Anticoagulation-related ED   visit, Anticoagulation-related fatality        INR History:  Anticoagulation Monitoring 2021   INR 4.30 3.00 2.40   INR Date 2021   INR Goal 2.0-3.0 2.0-3.0 2.0-3.0   Trend Same Same Same   Last Week Total 30 mg 30 mg 30 mg   Next Week Total 24 mg 30 mg 30 mg   Sun 4 mg 4 mg 4 mg   Mon 4 mg 4 mg 4 mg   Tue 4 mg 4 mg 4 mg   Wed Hold (); Otherwise 6 mg 6 mg 6 mg   Thu 4 mg 4 mg 4 mg   Fri 4 mg 4 mg 4 mg   Sat 4 mg 4 mg 4 mg   Visit Report - - -   Some recent  data might be hidden       Plan:  1. INR is Therapeutic today- see above in Anticoagulation Summary.   Will instruct Caitlyn Longoria to Continue their warfarin regimen- see above in Anticoagulation Summary.  2. Follow up in 4 weeks  3. They have been instructed to call if any changes in medications, doses, concerns, etc. Patient expresses understanding and has no further questions at this time.    Alexander Valiente [facility]

## 2021-07-23 ENCOUNTER — OFFICE (OUTPATIENT)
Dept: URBAN - METROPOLITAN AREA CLINIC 65 | Facility: CLINIC | Age: 86
End: 2021-07-23

## 2021-07-23 VITALS
HEIGHT: 65 IN | DIASTOLIC BLOOD PRESSURE: 70 MMHG | WEIGHT: 208 LBS | SYSTOLIC BLOOD PRESSURE: 104 MMHG | HEART RATE: 93 BPM

## 2021-07-23 DIAGNOSIS — K57.90 DIVERTICULOSIS OF INTESTINE, PART UNSPECIFIED, WITHOUT PERFO: ICD-10-CM

## 2021-07-23 DIAGNOSIS — R15.9 FULL INCONTINENCE OF FECES: ICD-10-CM

## 2021-07-23 DIAGNOSIS — K21.9 GASTRO-ESOPHAGEAL REFLUX DISEASE WITHOUT ESOPHAGITIS: ICD-10-CM

## 2021-07-23 PROCEDURE — 99213 OFFICE O/P EST LOW 20 MIN: CPT | Performed by: INTERNAL MEDICINE

## 2021-08-09 ENCOUNTER — ANTICOAGULATION VISIT (OUTPATIENT)
Dept: PHARMACY | Facility: HOSPITAL | Age: 86
End: 2021-08-09

## 2021-08-09 DIAGNOSIS — I48.0 PAROXYSMAL ATRIAL FIBRILLATION (HCC): Primary | ICD-10-CM

## 2021-08-09 LAB — INR PPP: 2.6

## 2021-08-09 NOTE — PROGRESS NOTES
Anticoagulation Clinic Progress Note    Anticoagulation Summary  As of 2021    INR goal:  2.0-3.0   TTR:  68.4 % (2.7 y)   INR used for dosin.60 (2021)   Warfarin maintenance plan:  6 mg every Wed; 4 mg all other days   Weekly warfarin total:  30 mg   No change documented:  Petra Oliveira RPH   Plan last modified:  Alexander Valiente, PharmD (2021)   Next INR check:  2021   Priority:  Maintenance   Target end date:  Indefinite    Indications    Paroxysmal atrial fibrillation (CMS/[facility]) [I48.0]             Anticoagulation Episode Summary     INR check location:      Preferred lab:      Send INR reminders to:   JACEY SINCLAIR CLINICAL POOL    Comments:  [facility] Home lab beginning 20      Anticoagulation Care Providers     Provider Role Specialty Phone number    Jonah Regalado Jr., MD Referring Cardiology [phone]          Clinic Interview:  Patient Findings     Negatives:  Signs/symptoms of thrombosis, Signs/symptoms of bleeding,   Laboratory test error suspected, Change in health, Change in alcohol use,   Change in activity, Upcoming invasive procedure, Emergency department   visit, Upcoming dental procedure, Missed doses, Extra doses, Change in   medications, Change in diet/appetite, Hospital admission, Bruising, Other   complaints      Clinical Outcomes     Negatives:  Major bleeding event, Thromboembolic event,   Anticoagulation-related hospital admission, Anticoagulation-related ED   visit, Anticoagulation-related fatality        INR History:  Anticoagulation Monitoring 2021   INR 3.00 2.40 2.60   INR Date 2021   INR Goal 2.0-3.0 2.0-3.0 2.0-3.0   Trend Same Same Same   Last Week Total 30 mg 30 mg 30 mg   Next Week Total 30 mg 30 mg 30 mg   Sun 4 mg 4 mg 4 mg   Mon 4 mg 4 mg 4 mg   Tue 4 mg 4 mg 4 mg   Wed 6 mg 6 mg 6 mg   Thu 4 mg 4 mg 4 mg   Fri 4 mg 4 mg 4 mg   Sat 4 mg 4 mg 4 mg   Visit Report - - -   Some recent data might be hidden        Plan:  1. INR is Therapeutic today- see above in Anticoagulation Summary.   Will instruct Caitlyn Longoria to Continue their warfarin regimen- see above in Anticoagulation Summary.  2. Follow up in 4 weeks  3. They have been instructed to call if any changes in medications, doses, concerns, etc. Patient expresses understanding and has no further questions at this time.    Petra Oliveira, [facility]

## 2021-08-14 NOTE — PROGRESS NOTES
[facility] INPATIENT PROGRESS NOTE         [address]    10/2/2020      PATIENT IDENTIFICATION:  Name: Caitlyn Longoria ADMIT: 2020   : 1934  PCP: Zenaida Suarez MD    MRN: [id number] LOS: 3 days   AGE/SEX: 85 y.o. female  ROOM: [facility]                     LOS 3    Reason for visit: Bronchitis with COPD and hypoxemia      SUBJECTIVE:      Complains of moderate cough which is nonproductive.  No chest pain.  Discussed with her the results of CT scan.  Mild wheezing on auscultation scattered coarse breath sounds.    I am seeing the patient for the first time today.  All patient problems are new to me.      Objective   OBJECTIVE:    Vital Sign Min/Max for last 24 hours  Temp  Min: 97.2 °F (36.2 °C)  Max: 98 °F (36.7 °C)   BP  Min: 105/63  Max: 119/76   Pulse  Min: 77  Max: 81   Resp  Min: 18  Max: 22   SpO2  Min: 94 %  Max: 96 %   No data recorded   No data recorded                         Body mass index is 38.59 kg/m².    Intake/Output Summary (Last 24 hours) at 10/2/2020 0926  Last data filed at 10/1/2020 2100  Gross per 24 hour   Intake 920 ml   Output --   Net 920 ml         Exam:  GEN:  No distress, appears stated age  EYES:   PERRL, anicteric sclera  ENT:    External ears/nose normal, OP clear  NECK:  No adenopathy, midline trachea  LUNGS: Normal chest on inspection, palpation and mild wheezing on auscultation  CV:  Normal S1S2, without murmur  ABD:  Non tender, non distended, no hepatosplenomegaly, +BS  EXT:  No edema, cyanosis or clubbing    Scheduled meds:  aspirin, 81 mg, Oral, Daily  budesonide-formoterol, 2 puff, Inhalation, BID - RT  carvedilol, 3.125 mg, Oral, BID  fluticasone, 1 spray, Nasal, BID  furosemide, 20 mg, Oral, QAM  glipizide, 2.5 mg, Oral, QAM  insulin lispro, 0-14 Units, Subcutaneous, TID AC  ipratropium-albuterol, 3 mL, Nebulization, 4x Daily - RT  losartan, 25 mg, Oral, Daily  montelukast, 10 mg, Oral, Nightly  oxazepam, 10 mg, Oral,    Assessment & Plan     Pain of upper abdomen  89-year-old male presented with upper abdominal pain, decreased appetite, weight loss, burping which started 3 weeks ago, complains of ongoing shortness of breath and cough.  Physical examination overall unremarkable.  Differentials discussed in detail including but not limited to peptic ulcer disease, gastritis/gastroesophageal reflux disease and mass lesion.  Needs further evaluation/imaging to delineate a specific diagnosis.  Recommended to go ER for further work-up.  Patient/spouse understood and in agreement with above plan.  All questions answered.      [facility] Urgent Care Provider  [facility] KVNG Moreno is a 89 year old who presents for the following health issues  accompanied by his spouse:    HPI     Concern -   Onset: 3 weeks   Description: decreased appetite, weight loss, abdominal pain, burping, sob and cough  Intensity: moderate  Progression of Symptoms:  worsening  Accompanying Signs & Symptoms: no fever, chills, constipation, diarrhea or other relevant systemic symptoms    Previous history of similar problem: Gastroesophageal reflux disease  Therapies tried and outcome: Gas-X, Tums, Rolaids        Review of Systems   Constitutional, HEENT, cardiovascular, pulmonary, GI, , musculoskeletal, neuro, skin, endocrine and psych systems are negative, except as otherwise noted.      Objective    BP (!) 153/76 (BP Location: Right arm, Patient Position: Sitting, Cuff Size: Adult Regular)   Pulse 75   Temp 97.5  F (36.4  C) (Tympanic)   Wt 74.4 kg (164 lb)   SpO2 95%   BMI 21.06 kg/m    Body mass index is 21.06 kg/m .  Physical Exam   GENERAL: alert, no distress, frail and elderly  EYES: Eyes grossly normal to inspection, PERRL and conjunctivae and sclerae normal  HENT: normal cephalic/atraumatic, nose and mouth without ulcers or lesions, oropharynx clear and oral mucous membranes moist  NECK: no adenopathy, no asymmetry, masses,  Nightly  oxybutynin XL, 10 mg, Oral, Nightly  pantoprazole, 40 mg, Oral, Daily  piperacillin-tazobactam, 3.375 g, Intravenous, Once  piperacillin-tazobactam, 3.375 g, Intravenous, Q8H  rosuvastatin, 20 mg, Oral, Nightly  sodium chloride, 10 mL, Intravenous, Q12H  sodium chloride, 4 mL, Nebulization, BID  warfarin (COUMADIN) (dosing per levels), , Does not apply, Daily      IV meds:                      Pharmacy to dose warfarin,       Data Review:  Results from last 7 days   Lab Units 10/02/20  0747 10/01/20  0710 09/30/20  0702 09/29/20  1755   SODIUM mmol/L 139 140 142 141   POTASSIUM mmol/L 3.7 3.9 4.1 3.2*   CHLORIDE mmol/L 101 103 108* 105   CO2 mmol/L 31.1* 27.4 21.9* 22.9   BUN mg/dL 25* 18 16 16   CREATININE mg/dL 0.87 0.90 0.84 1.00   GLUCOSE mg/dL 148* 189* 235* 131*   CALCIUM mg/dL 8.4* 8.5* 8.3* 9.1         Estimated Creatinine Clearance: 51 mL/min (by C-G formula based on SCr of 0.87 mg/dL).  Results from last 7 days   Lab Units 10/02/20  0747 10/01/20  0710 09/29/20  1755   WBC 10*3/mm3 22.64* 23.36* 24.44*   HEMOGLOBIN g/dL 12.4 11.3* 12.7   PLATELETS 10*3/mm3 158 145 158     Results from last 7 days   Lab Units 10/02/20  0747 10/01/20  0710 09/30/20  0702 09/29/20  1755   INR  2.31* 3.60* 3.78* 3.41*     Results from last 7 days   Lab Units 10/02/20  0747 10/01/20  0710 09/29/20  1755   ALT (SGPT) U/L 25 19 23   AST (SGOT) U/L 22 16 17         Results from last 7 days   Lab Units 10/02/20  0747 09/30/20  0702 09/29/20  2004 09/29/20  1755   PROCALCITONIN ng/mL 0.07 0.06 0.05  --    LACTATE mmol/L  --   --   --  1.0           CT chest 9/29 reviewed: No PE.  New infiltrate and atelectasis right lower lobe.  No other acute abnormalities.      Microbiology reviewed  Rhinovirus noted on respiratory viral panel.  Pseudomonas from respiratory culture      )        Active Hospital Problems    Diagnosis  POA   • **Chronic bronchitis (CMS/HCC) [J42]  Yes   • Closed wedge compression fracture of T5 vertebra  or scars and thyroid normal to palpation  RESP: lungs clear to auscultation - no rales, rhonchi or wheezes  CV: regular rates and rhythm, normal S1 S2, no S3 or S4 and no murmur, click or rub  ABDOMEN: soft, nontender  MS: no gross musculoskeletal defects noted, no edema  NEURO: Grossly intact  PSYCH: mentation appears normal, affect normal/bright      Wt Readings from Last 10 Encounters:   08/14/21 74.4 kg (164 lb)   07/23/21 77.1 kg (170 lb)   09/21/20 78.2 kg (172 lb 8 oz)   07/29/20 77.6 kg (171 lb)   07/21/20 81.6 kg (180 lb)   09/26/19 82.1 kg (181 lb)   09/06/19 83 kg (183 lb)   07/02/19 80.3 kg (177 lb)   03/04/19 82.7 kg (182 lb 6.4 oz)   02/04/19 80.7 kg (178 lb)              (CMS/[facility]) [S22.050A]  Unknown   • Dyspnea [R06.00]  Yes   • Anticoagulated on Coumadin [Z79.01]  Not Applicable   • CLL (chronic lymphoid leukemia) in relapse (CMS/[facility]) [C91.92]  Yes   • Morbid obesity (CMS/[facility]) [E66.01]  Yes   • HTN (hypertension) [I10]  Yes   • CKD (chronic kidney disease), stage III [N18.30]  Yes   • DM II (diabetes mellitus, type II), controlled (CMS/[facility]) [E11.9]  Yes   • Paroxysmal atrial fibrillation (CMS/[facility]) [I48.0]  Yes      Resolved Hospital Problems   No resolved problems to display.       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  Continue antibiotics.  Treatment for rhinovirus is supportive.  Bronchodilators for COPD symptoms.  BiPAP for sleep apnea.        Santiago Romero MD  Pulmonary and Critical Care Medicine  [facility], [facility]  10/2/2020    09:26 EDT

## 2021-09-07 RX ORDER — LOSARTAN POTASSIUM 25 MG/1
TABLET ORAL
Qty: 90 TABLET | Refills: 3 | Status: SHIPPED | OUTPATIENT
Start: 2021-09-07 | End: 2022-01-14 | Stop reason: ALTCHOICE

## 2021-09-07 NOTE — TELEPHONE ENCOUNTER
Rx Refill Note  Requested Prescriptions     Pending Prescriptions Disp Refills    losartan (COZAAR) 25 MG tablet [Pharmacy Med Name: LOSARTAN POTASSIUM 25 MG TAB] 90 tablet      Sig: TAKE ONE TABLET BY MOUTH DAILY      Last office visit with prescribing clinician: 1/13/2021      Next office visit with prescribing clinician: 1/14/2022            Patricia Butler MA  09/07/21, 13:50 EDT

## 2021-09-08 ENCOUNTER — ANTICOAGULATION VISIT (OUTPATIENT)
Dept: PHARMACY | Facility: HOSPITAL | Age: 86
End: 2021-09-08

## 2021-09-08 DIAGNOSIS — I48.0 PAROXYSMAL ATRIAL FIBRILLATION (HCC): Primary | ICD-10-CM

## 2021-09-08 LAB — INR PPP: 4.9

## 2021-09-08 NOTE — PROGRESS NOTES
Anticoagulation Clinic Progress Note    Anticoagulation Summary  As of 2021    INR goal:  2.0-3.0   TTR:  66.8 % (2.7 y)   INR used for dosin.90 (2021)   Warfarin maintenance plan:  6 mg every Wed; 4 mg all other days   Weekly warfarin total:  30 mg   Plan last modified:  Alexander Valiente, PharmD (2021)   Next INR check:  9/10/2021   Priority:  Maintenance   Target end date:  Indefinite    Indications    Paroxysmal atrial fibrillation (CMS/HCC) [I48.0]             Anticoagulation Episode Summary     INR check location:      Preferred lab:      Send INR reminders to:   [facility] POOL    Comments:  [facility]      Anticoagulation Care Providers     Provider Role Specialty Phone number    Jonah Regalado Jr., MD Referring Cardiology [phone]          Clinic Interview:  Patient Findings     Negatives:  Signs/symptoms of thrombosis, Signs/symptoms of bleeding,   Laboratory test error suspected, Change in health, Change in alcohol use,   Change in activity, Upcoming invasive procedure, Emergency department   visit, Upcoming dental procedure, Missed doses, Extra doses, Change in   medications, Change in diet/appetite, Hospital admission, Bruising, Other   complaints      Clinical Outcomes     Negatives:  Major bleeding event, Thromboembolic event,   Anticoagulation-related hospital admission, Anticoagulation-related ED   visit, Anticoagulation-related fatality        INR History:  Anticoagulation Monitoring 2021   INR 2.40 2.60 4.90   INR Date 2021   INR Goal 2.0-3.0 2.0-3.0 2.0-3.0   Trend Same Same Same   Last Week Total 30 mg 30 mg 30 mg   Next Week Total 30 mg 30 mg 22 mg   Sun 4 mg 4 mg -   Mon 4 mg 4 mg -   Tue 4 mg 4 mg -   Wed 6 mg 6 mg Hold ()   Thu 4 mg 4 mg 2 mg ()   Fri 4 mg 4 mg -   Sat 4 mg 4 mg -   Visit Report - - -   Some recent data might be hidden       Plan:  1. INR is Supratherapeutic today- see  above in Anticoagulation Summary.   Will instruct Caitlyn Longoria to Change their warfarin regimen- see above in Anticoagulation Summary.  2. Follow up in 2 days   3. They have been instructed to call if any changes in medications, doses, concerns, etc. Patient expresses understanding and has no further questions at this time.    Petra Oliveira [facility]

## 2021-09-10 ENCOUNTER — ANTICOAGULATION VISIT (OUTPATIENT)
Dept: PHARMACY | Facility: HOSPITAL | Age: 86
End: 2021-09-10

## 2021-09-10 DIAGNOSIS — I48.0 PAROXYSMAL ATRIAL FIBRILLATION (HCC): Primary | ICD-10-CM

## 2021-09-10 LAB — INR PPP: 2.4

## 2021-09-10 NOTE — PROGRESS NOTES
Anticoagulation Clinic Progress Note    Anticoagulation Summary  As of 9/10/2021    INR goal:  2.0-3.0   TTR:  66.7 % (2.7 y)   INR used for dosin.40 (9/10/2021)   Warfarin maintenance plan:  6 mg every Wed; 4 mg all other days   Weekly warfarin total:  30 mg   No change documented:  Petra Oliveira RPH   Plan last modified:  Alexander Valiente, PharmD (2021)   Next INR check:  2021   Priority:  Maintenance   Target end date:  Indefinite    Indications    Paroxysmal atrial fibrillation (CMS/[facility]) [I48.0]             Anticoagulation Episode Summary     INR check location:      Preferred lab:      Send INR reminders to:   JACEY SINCLAIR CLINICAL POOL    [facility]      Anticoagulation Care Providers     Provider Role Specialty Phone number    Jonah Regalado Jr., MD Referring Cardiology [phone]          Clinic Interview:  Patient Findings     Negatives:  Signs/symptoms of thrombosis, Signs/symptoms of bleeding,   Laboratory test error suspected, Change in health, Change in alcohol use,   Change in activity, Upcoming invasive procedure, Emergency department   visit, Upcoming dental procedure, Missed doses, Extra doses, Change in   medications, Change in diet/appetite, Hospital admission, Bruising, Other   complaints      Clinical Outcomes     Negatives:  Major bleeding event, Thromboembolic event,   Anticoagulation-related hospital admission, Anticoagulation-related ED   visit, Anticoagulation-related fatality        INR History:  Anticoagulation Monitoring 2021 2021 9/10/2021   INR 2.60 4.90 2.40   INR Date 2021 2021 9/10/2021   INR Goal 2.0-3.0 2.0-3.0 2.0-3.0   Trend Same Same Same   Last Week Total 30 mg 30 mg 22 mg   Next Week Total 30 mg 22 mg 30 mg   Sun 4 mg - 4 mg   Mon 4 mg - 4 mg   Tue 4 mg - 4 mg   Wed 6 mg Hold () 6 mg   Thu 4 mg 2 mg () 4 mg   Fri 4 mg - 4 mg   Sat 4 mg - 4 mg   Visit Report - - -   Some recent data might be hidden        Plan:  1. INR is Therapeutic today- see above in Anticoagulation Summary.   Will instruct Caitlyn Longoria to Continue their warfarin regimen- see above in Anticoagulation Summary.  2. Follow up in 1.5 weeks  3. Pt has agreed to only be called if INR out of range. They have been instructed to call if any changes in medications, doses, concerns, etc. Patient expresses understanding and has no further questions at this time.    Petra Oliveira RP

## 2021-09-14 ENCOUNTER — LAB (OUTPATIENT)
Dept: LAB | Facility: HOSPITAL | Age: 86
End: 2021-09-14

## 2021-09-14 ENCOUNTER — APPOINTMENT (OUTPATIENT)
Dept: ONCOLOGY | Facility: HOSPITAL | Age: 86
End: 2021-09-14

## 2021-09-14 ENCOUNTER — OFFICE VISIT (OUTPATIENT)
Dept: ONCOLOGY | Facility: CLINIC | Age: 86
End: 2021-09-14

## 2021-09-14 ENCOUNTER — INFUSION (OUTPATIENT)
Dept: ONCOLOGY | Facility: HOSPITAL | Age: 86
End: 2021-09-14

## 2021-09-14 VITALS — DIASTOLIC BLOOD PRESSURE: 80 MMHG | SYSTOLIC BLOOD PRESSURE: 140 MMHG | HEART RATE: 88 BPM

## 2021-09-14 VITALS
TEMPERATURE: 97.3 F | RESPIRATION RATE: 16 BRPM | HEART RATE: 87 BPM | OXYGEN SATURATION: 97 % | WEIGHT: 193 LBS | HEIGHT: 63 IN | BODY MASS INDEX: 34.2 KG/M2 | DIASTOLIC BLOOD PRESSURE: 77 MMHG | SYSTOLIC BLOOD PRESSURE: 115 MMHG

## 2021-09-14 DIAGNOSIS — C91.12 CLL (CHRONIC LYMPHOID LEUKEMIA) IN RELAPSE (HCC): Primary | ICD-10-CM

## 2021-09-14 DIAGNOSIS — C91.12 CLL (CHRONIC LYMPHOID LEUKEMIA) IN RELAPSE (HCC): ICD-10-CM

## 2021-09-14 LAB
BASOPHILS # BLD AUTO: 0.01 10*3/MM3 (ref 0–0.2)
BASOPHILS NFR BLD AUTO: 0.1 % (ref 0–1.5)
DEPRECATED RDW RBC AUTO: 54.7 FL (ref 37–54)
EOSINOPHIL # BLD AUTO: 0 10*3/MM3 (ref 0–0.4)
EOSINOPHIL NFR BLD AUTO: 0 % (ref 0.3–6.2)
ERYTHROCYTE [DISTWIDTH] IN BLOOD BY AUTOMATED COUNT: 16.4 % (ref 12.3–15.4)
HCT VFR BLD AUTO: 37.4 % (ref 34–46.6)
HGB BLD-MCNC: 11.7 G/DL (ref 12–15.9)
IGA1 MFR SER: 14 MG/DL (ref 70–400)
IGG1 SER-MCNC: 710 MG/DL (ref 700–1600)
IGM SERPL-MCNC: 28 MG/DL (ref 40–230)
IMM GRANULOCYTES # BLD AUTO: 0.07 10*3/MM3 (ref 0–0.05)
IMM GRANULOCYTES NFR BLD AUTO: 0.6 % (ref 0–0.5)
LYMPHOCYTES # BLD AUTO: 6.89 10*3/MM3 (ref 0.7–3.1)
LYMPHOCYTES NFR BLD AUTO: 57.9 % (ref 19.6–45.3)
MCH RBC QN AUTO: 28.7 PG (ref 26.6–33)
MCHC RBC AUTO-ENTMCNC: 31.3 G/DL (ref 31.5–35.7)
MCV RBC AUTO: 91.9 FL (ref 79–97)
MONOCYTES # BLD AUTO: 0.39 10*3/MM3 (ref 0.1–0.9)
MONOCYTES NFR BLD AUTO: 3.3 % (ref 5–12)
NEUTROPHILS NFR BLD AUTO: 38.1 % (ref 42.7–76)
NEUTROPHILS NFR BLD AUTO: 4.54 10*3/MM3 (ref 1.7–7)
NRBC BLD AUTO-RTO: 0 /100 WBC (ref 0–0.2)
PLATELET # BLD AUTO: 215 10*3/MM3 (ref 140–450)
PMV BLD AUTO: 9.7 FL (ref 6–12)
RBC # BLD AUTO: 4.07 10*6/MM3 (ref 3.77–5.28)
WBC # BLD AUTO: 11.9 10*3/MM3 (ref 3.4–10.8)

## 2021-09-14 PROCEDURE — 36415 COLL VENOUS BLD VENIPUNCTURE: CPT

## 2021-09-14 PROCEDURE — 85025 COMPLETE CBC W/AUTO DIFF WBC: CPT

## 2021-09-14 PROCEDURE — 96366 THER/PROPH/DIAG IV INF ADDON: CPT

## 2021-09-14 PROCEDURE — 99214 OFFICE O/P EST MOD 30 MIN: CPT | Performed by: INTERNAL MEDICINE

## 2021-09-14 PROCEDURE — 82784 ASSAY IGA/IGD/IGG/IGM EACH: CPT | Performed by: INTERNAL MEDICINE

## 2021-09-14 PROCEDURE — 25010000002 IMMUNE GLOBULIN (HUMAN) 30 GM/300ML SOLUTION: Performed by: INTERNAL MEDICINE

## 2021-09-14 PROCEDURE — 96365 THER/PROPH/DIAG IV INF INIT: CPT

## 2021-09-14 RX ORDER — HYDROXYZINE PAMOATE 25 MG/1
25 CAPSULE ORAL ONCE
Status: COMPLETED | OUTPATIENT
Start: 2021-09-14 | End: 2021-09-14

## 2021-09-14 RX ORDER — ACETAMINOPHEN 325 MG/1
650 TABLET ORAL ONCE
Status: COMPLETED | OUTPATIENT
Start: 2021-09-14 | End: 2021-09-14

## 2021-09-14 RX ORDER — HYDROXYZINE PAMOATE 25 MG/1
25 CAPSULE ORAL ONCE
Status: CANCELLED | OUTPATIENT
Start: 2021-09-14

## 2021-09-14 RX ORDER — GLIPIZIDE 5 MG/1
5 TABLET ORAL
COMMUNITY
Start: 2021-07-12

## 2021-09-14 RX ORDER — FAMOTIDINE 10 MG/ML
20 INJECTION, SOLUTION INTRAVENOUS AS NEEDED
Status: CANCELLED | OUTPATIENT
Start: 2021-09-14

## 2021-09-14 RX ORDER — SODIUM CHLORIDE 9 MG/ML
250 INJECTION, SOLUTION INTRAVENOUS ONCE
Status: COMPLETED | OUTPATIENT
Start: 2021-09-14 | End: 2021-09-14

## 2021-09-14 RX ORDER — SODIUM CHLORIDE 9 MG/ML
250 INJECTION, SOLUTION INTRAVENOUS ONCE
Status: CANCELLED | OUTPATIENT
Start: 2021-09-14

## 2021-09-14 RX ORDER — DIPHENHYDRAMINE HYDROCHLORIDE 50 MG/ML
50 INJECTION INTRAMUSCULAR; INTRAVENOUS AS NEEDED
Status: CANCELLED | OUTPATIENT
Start: 2021-09-14

## 2021-09-14 RX ORDER — ACETAMINOPHEN 325 MG/1
650 TABLET ORAL ONCE
Status: CANCELLED | OUTPATIENT
Start: 2021-09-14

## 2021-09-14 RX ORDER — MEPERIDINE HYDROCHLORIDE 50 MG/ML
25 INJECTION INTRAMUSCULAR; INTRAVENOUS; SUBCUTANEOUS
Status: CANCELLED | OUTPATIENT
Start: 2021-09-14 | End: 2021-09-15

## 2021-09-14 RX ADMIN — IMMUNE GLOBULIN INFUSION (HUMAN) 30 G: 100 INJECTION, SOLUTION INTRAVENOUS; SUBCUTANEOUS at 12:26

## 2021-09-14 RX ADMIN — HYDROXYZINE PAMOATE 25 MG: 25 CAPSULE ORAL at 11:57

## 2021-09-14 RX ADMIN — ACETAMINOPHEN 650 MG: 325 TABLET ORAL at 11:57

## 2021-09-14 RX ADMIN — SODIUM CHLORIDE 250 ML: 9 INJECTION, SOLUTION INTRAVENOUS at 12:05

## 2021-09-14 NOTE — PROGRESS NOTES
Subjective .     REASONS FOR FOLLOW UP:  1. chronic lymphocytic leukemia with recurrent lung infections    Referring MD:    Amarilis Suarez MD    History of Present Illness patient is an.age female with 2014 with stage 0 CLL which is never required treatments.    Over the last year however she has had multiple hospitalizations for lung infections predominantly viral probably also bacterial and at her last hospitalization in October we rechecked her gammaglobulins which were low and opted to start IV IgG monthly to see if this would keep her out of the hospital.    She has continued on monthly IVIG since October through March 2021.  The patient  happily reports she has had no infection since her March IVIG.  She reports feeling quite well with walking and doing exercise classes including yoga.  Her body aches are improving as is her energy level.    We are ready to resume IV IgG again for the winter months and she is a little ambivalent about this because she has poor venous access and she will try as long as she can get the IV in.  She is not interested in a port at this time and I do not think she will be able to get subcutaneous IVIG because she is on Coumadin    No fever sweats or weight loss  Past Medical History:   Diagnosis Date   • Aortic calcification (CMS/HCC)     mild, on echo 12/17/2017   • Aortic regurgitation     Trace   • Asthma    • Atrial fibrillation (CMS/HCC)    • CAD (coronary artery disease)    • Chronic combined systolic and diastolic congestive heart failure (CMS/HCC)    • CKD (chronic kidney disease) stage 3, GFR 30-59 ml/min (CMS/HCC)    • Coronary artery disease involving native coronary artery of native heart with angina pectoris (CMS/HCC)    • Disc degeneration, lumbar    • DM type 2 (diabetes mellitus, type 2) (CMS/HCC)    • GERD (gastroesophageal reflux disease)    • History of aneurysm     right femoral artery s/p LHC   • History of blood transfusion    • History of fracture    •  History of vitamin D deficiency    • Hyperlipidemia    • Hypertension    • Mild mitral regurgitation    • Mitral annular calcification     12/8/2017- echo, moderate   • PAF (paroxysmal atrial fibrillation) (CMS/HCC)    • Peripheral neuropathy    • Skin cancer     Left hand   • Sleep apnea    • SSS (sick sinus syndrome) (CMS/HCC)    • Stroke (cerebrum) (CMS/HCC)    • Tricuspid regurgitation     Trace       ONCOLOGIC HISTORY:    [facility] HISTORY  Patient is an 85-year-old female whom I had seen in 2014 when she was hospitalized at [facility] and was diagnosed with chronic lymphocytic leukemia.  She has not been to see me in over 4 years and is following with Dr. Suarez her primary care doctor and her white count is gone up a little higher than usual to 22,000 and she is referred back to us for evaluation.  We are doing a telephone visit because of the coronavirus pandemic and her age and susceptibility.    When I originally saw her in 2014 she was brought into the ER unresponsive in septic shock on 03/25/2014 with methicillin-resistant staphylococcus identified in her blood cultures. The patient has been on antibiotic with improvement, but had some residual kidney damage with a creatinine in the 4-range requiring hemodialysis, and was noted on admission to have an elevated white count with lymphocytosis, normal hemoglobin, but a platelet count of 95,000, and over the course of the illness her platelet count normalized and the white count had gone up into the 20,000 range with lymphocytosis. A flow cytometry was sent which is consistent with CLL, she came to our office once or twice and then stopped coming because she was so stable     She tells me now she was hospitalized recently with rhinovirus infection and has had diagnosed with asthma and is having a lot of respiratory issues but does not require oxygen.She is at the [facility] in independent living and managing fairly well    She denies fever sweats or weight loss.   Her last white count was on 5/26/2020  Showed a white count of 18,000 with 66 lymphs and 27 neutrophils platelet white hemoglobin is 12.6 platelet 188,000    I reassured Christopher that no treatment is indicated for this level of leukocytosis from CLL and if she has no systemic complaints I do not feel strongly about doing CAT scans at her age but I would like to physically examine her in the office in a couple of months to make sure there is no obvious adenopathy or hepatosplenomegaly.    She does have recurrent infections and we can check quantitative immunoglobulins to see how low they are as another adjunctive option to prevent recurrent infections if she has hypogammaglobulinemia    She remains on Coumadin for atrial fibrillation      Current Outpatient Medications on File Prior to Visit   Medication Sig Dispense Refill   • acetaminophen (TYLENOL) 325 MG tablet Take 2 tablets by mouth Every 4 (Four) Hours As Needed for Mild Pain .     • acetylcysteine (MUCOMYST) 20 % nebulizer solution Take 2 mL by nebulization 4 (Four) Times a Day.     • albuterol (PROVENTIL) (2.5 MG/3ML) 0.083% nebulizer solution Take 2.5 mg by nebulization Every 4 (Four) Hours.     • albuterol sulfate  (90 Base) MCG/ACT inhaler Inhale 2 puffs Every 4 (Four) Hours As Needed for Wheezing. 1 inhaler 3   • aspirin 81 MG tablet Take 81 mg by mouth Daily.     • Benralizumab (FASENRA SC) Inject  under the skin into the appropriate area as directed. Gets one shot a month, next shot may 13 then one every 8 weeks     • Calcium Carbonate-Vitamin D (calcium-vitamin D) 500-200 MG-UNIT tablet per tablet Take 1 tablet by mouth.     • carvedilol (COREG) 3.125 MG tablet TAKE ONE TABLET BY MOUTH TWICE A  tablet 2   • cephalexin (KEFLEX) 500 MG capsule Take 500 mg by mouth 3 (Three) Times a Day.     • fluticasone (FLONASE) 50 MCG/ACT nasal spray 1 spray into the nostril(s) as directed by provider Daily.     • Fluticasone-Umeclidin-Vilant (Trelegy  Ellipta) 100-62.5-25 MCG/INH aerosol powder  Inhale 1 puff Daily. As directed      • furosemide (LASIX) 20 MG tablet Take 20 mg by mouth Every Morning.     • glipizide (GLUCOTROL) 5 MG tablet Take 5 mg by mouth.     • guaiFENesin (MUCINEX) 600 MG 12 hr tablet Take 1 tablet by mouth Every 12 (Twelve) Hours.     • Loperamide HCl (IMODIUM PO) Take  by mouth Daily.     • LORazepam (ATIVAN) 0.5 MG tablet      • losartan (COZAAR) 25 MG tablet TAKE ONE TABLET BY MOUTH DAILY 90 tablet 3   • melatonin 5 MG tablet tablet Take 5 mg by mouth Every Night.     • metFORMIN (GLUCOPHAGE) 500 MG tablet Take 500 mg by mouth Daily With Breakfast.     • montelukast (SINGULAIR) 10 MG tablet Take 10 mg by mouth Every Night.     • mupirocin (BACTROBAN) 2 % ointment      • nitrofurantoin, macrocrystal-monohydrate, (MACROBID) 100 MG capsule      • nystatin (MYCOSTATIN) 558665 UNIT/GM cream      • Omega-3 Fatty Acids (FISH OIL) 1000 MG capsule capsule Take 1,000 mg by mouth 2 (Two) Times a Day With Meals.     • oxybutynin XL (DITROPAN-XL) 10 MG 24 hr tablet Take 10 mg by mouth every night at bedtime.     • pantoprazole (PROTONIX) 40 MG EC tablet Take 40 mg by mouth Daily.     • polyethyl glycol-propyl glycol (LUBRICATING EYE DROPS) 0.4-0.3 % solution ophthalmic solution Administer 1 drop to both eyes Every 1 (One) Hour As Needed.     • rosuvastatin (CRESTOR) 20 MG tablet Take 20 mg by mouth Every Night.     • sodium chloride 0.65 % nasal spray 1 spray into the nostril(s) as directed by provider As Needed for Congestion.  12   • Tobramycin 300 MG/4ML nebulizer solution      • trimethoprim (TRIMPEX) 100 MG tablet Take 100 mg by mouth Daily. For UTI prophylaxis     • warfarin (COUMADIN) 4 MG tablet Take one and one-half tablets (6 mg) by mouth on Wed, and take one tablet (4 mg) by mouth all other days or as directed. 100 tablet 1   • [DISCONTINUED] Calcium Carbonate-Vitamin D (CALCIUM 500 + D PO) Take  by mouth 2 (two) times a day.     •  [DISCONTINUED] glipizide (GLUCOTROL XL) 2.5 MG 24 hr tablet        No current facility-administered medications on file prior to visit.       ALLERGIES:     Allergies   Allergen Reactions   • Accupril [Quinapril Hcl] Swelling, Other (See Comments), GI Intolerance and Delirium     HA, constipation    • Ahist [Chlorpheniramine] Nausea Only, Other (See Comments) and Dizziness     Headache, Blurred vision   • Clarithromycin Nausea Only, Other (See Comments) and Mental Status Change     HA, Depression, Flushing   • Esomeprazole GI Intolerance   • Levalbuterol Swelling   • Levocetirizine Diarrhea and GI Intolerance   • Lipitor [Atorvastatin] Other (See Comments) and Myalgia     Dark urine   • Omeprazole Nausea Only and Other (See Comments)     HA   • Pravachol [Pravastatin] Nausea Only and GI Intolerance     Bloated, Constipation, HA   • Sulindac Other (See Comments) and Myalgia     HA, joint pain, bruising   • Valdecoxib Irritability   • Chlorcyclizine Unknown - Low Severity   • Diclofenac Sodium Unknown - Low Severity   • Diphenhydramine Unknown - Low Severity   • Lodine [Etodolac] Unknown - Low Severity   • Sulfa Antibiotics Unknown - Low Severity       Social History     Socioeconomic History   • Marital status: Single     Spouse name: Not on file   • Number of children: Not on file   • Years of education: Not on file   • Highest education level: Not on file   Tobacco Use   • Smoking status: Never Smoker   • Smokeless tobacco: Never Used   • Tobacco comment: caffeine use- soda   Substance and Sexual Activity   • Alcohol use: Never   • Drug use: No   • Sexual activity: Defer         Cancer-related family history includes Colon cancer in her sister.     I have reviewed the patient's medical history in detail and updated the computerized patient record.    Review of Systems   Constitutional: Negative for appetite change, chills, diaphoresis, fatigue, fever and unexpected weight change.   HENT: Negative for mouth sores  and sore throat.    Eyes: Negative for visual disturbance.   Respiratory: Positive for shortness of breath (stable). Negative for cough and wheezing.    Gastrointestinal: Negative.  Negative for abdominal pain, diarrhea, nausea and vomiting.   Genitourinary: Negative.  Negative for dysuria and frequency.   Musculoskeletal: Positive for back pain.   Neurological: Negative.  Negative for dizziness and weakness.   Hematological: Does not bruise/bleed easily.   Psychiatric/Behavioral: Negative.  The patient is not nervous/anxious.    All other systems reviewed and are negative.  I have reviewed and confirmed the accuracy of the ROS as documented by the MA/LPN/RN Lucien Larkin MD      Objective      Vitals:    09/14/21 1054   BP: 115/77   Pulse: 87   Resp: 16   Temp: 97.3 °F (36.3 °C)   SpO2: 97%     Current Status 9/14/2021   ECOG score 2     Pain Score    09/14/21 1054   PainSc: 0-No pain       CONSTITUTIONAL:  Vital signs reviewed.  No distress, looks comfortable.  EYES:  Conjunctiva and lids unremarkable.  PERRLA  EARS,NOSE,MOUTH,THROAT: Hearing intact.  Lips, teeth, gums appear unremarkable.  RESPIRATORY:  Normal respiratory effort.  Lungs clear to auscultation on the right decreased breath breath sounds lower half of the left lung  CARDIOVASCULAR:  Normal S1, S2.  No murmurs rubs or gallops.  No significant lower extremity edema.  GASTROINTESTINAL: Abdomen appears unremarkable.  Nontender.  No hepatomegaly.  No splenomegaly.  LYMPHATIC:  No cervical, supraclavicular, axillary lymphadenopathy.  SKIN:  Warm.  No rashes.  Numerous ecchymosis on her arms and legs  PSYCHIATRIC:  Normal judgment and insight.  Normal mood and affect.       I have reexamined the patient 09/14/2021 and the results are consistent with the previously documented exam. Lucien Larkin MD       RECENT LABS:  Results from last 7 days   Lab Units 09/14/21  1101   WBC 10*3/mm3 11.90*   NEUTROS ABS 10*3/mm3 4.54   HEMOGLOBIN g/dL 11.7*    HEMATOCRIT % 37.4   PLATELETS 10*3/mm3 215         Results from last 7 days   Lab Units 09/10/21  0000 09/08/21  0000   INR  2.40 4.90            Assessment/Plan    1. Chronic lymphocytic leukemia  -stage 0 since 2014 untreated    2.  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  The patient received IVIG treatments October 2020 through March 2021.  We have held IVIG since that time and the patient is happy to report no infections aside from some urinary tract infection which she has chronically.  Otherwise she has been feeling very well.  · Resume IV IgG from October through March 2022 monthly    3.  COPD/asthma /emphysema on home O2-recurrent respiratory infections with RSV and rhinovirus and bacteria    4.  Atrial fibrillation on Coumadin    5.  Hypertension/hypercholesterolemia/type 2 diabetes on treatment    6.  Vaccinated against coronavirus    7.  Mild anemia-continued to observe for now    Plan  1.  Follow up with NP in 3 months with Dr. Larkin in 6 months   2. Monthly IV IgG x6 start today  3.patient encouraged to call in the interim for any new infection.  4. No treatment indicated for CLL at this time        Lucien Larkin MD

## 2021-09-20 ENCOUNTER — ANTICOAGULATION VISIT (OUTPATIENT)
Dept: PHARMACY | Facility: HOSPITAL | Age: 86
End: 2021-09-20

## 2021-09-20 DIAGNOSIS — I48.0 PAROXYSMAL ATRIAL FIBRILLATION (HCC): Primary | ICD-10-CM

## 2021-09-20 LAB — INR PPP: 3.1

## 2021-09-20 NOTE — PROGRESS NOTES
Anticoagulation Clinic Progress Note    Anticoagulation Summary  As of 9/20/2021    INR goal:  2.0-3.0   TTR:  66.9 % (2.8 y)   INR used for dosing:  3.10 (9/20/2021)   Warfarin maintenance plan:  6 mg every Wed; 4 mg all other days   Weekly warfarin total:  30 mg   Plan last modified:  Pooja Mccall, [facility] (9/20/2021)   Next INR check:  10/4/2021   Priority:  Maintenance   Target end date:  Indefinite    Indications    Paroxysmal atrial fibrillation (CMS/HCC) [I48.0]             Anticoagulation Episode Summary     INR check location:      Preferred lab:      Send INR reminders to:   JACEY SINCLAIR CLINICAL POOL    Comments:  [facility] beginning 4/22/20      Anticoagulation Care Providers     Provider Role Specialty Phone number    Jonah Regalado Jr., MD Referring Cardiology [phone]          Clinic Interview:  Patient Findings     Positives:  Change in diet/appetite    Negatives:  Signs/symptoms of thrombosis, Signs/symptoms of bleeding,   Laboratory test error suspected, Change in health, Change in alcohol use,   Change in activity, Upcoming invasive procedure, Emergency department   visit, Upcoming dental procedure, Missed doses, Extra doses, Change in   medications, Hospital admission, Bruising, Other complaints    Comments:  Reports eating less overall       Clinical Outcomes     Negatives:  Major bleeding event, Thromboembolic event,   Anticoagulation-related hospital admission, Anticoagulation-related ED   visit, Anticoagulation-related fatality    Comments:  Reports eating less overall         INR History:  Anticoagulation Monitoring 9/8/2021 9/10/2021 9/20/2021   INR 4.90 2.40 3.10   INR Date 9/8/2021 9/10/2021 9/20/2021   INR Goal 2.0-3.0 2.0-3.0 2.0-3.0   Trend Same Same Same   Last Week Total 30 mg 22 mg 30 mg   Next Week Total 22 mg 30 mg 28 mg   Sun - 4 mg 4 mg   Mon - 4 mg 4 mg   Tue - 4 mg 4 mg   Wed Hold (9/8) 6 mg 4 mg (9/22); Otherwise 6 mg   Thu 2 mg (9/9) 4 mg 4 mg   Fri - 4 mg 4  mg   Sat - 4 mg 4 mg   Visit Report - - -   Some recent data might be hidden       Plan:  1. INR is Supratherapeutic today- see above in Anticoagulation Summary.   Will instruct Caitlyn GARRETT Longoria to Change their warfarin regimen- see above in Anticoagulation Summary.  2. Follow up in 2 weeks  3. Pt has agreed to only be called if INR out of range. They have been instructed to call if any changes in medications, doses, concerns, etc. Patient expresses understanding and has no further questions at this time.    Pooja Mccall, [facility]

## 2021-10-06 ENCOUNTER — ANTICOAGULATION VISIT (OUTPATIENT)
Dept: PHARMACY | Facility: HOSPITAL | Age: 86
End: 2021-10-06

## 2021-10-06 DIAGNOSIS — I48.0 PAROXYSMAL ATRIAL FIBRILLATION (HCC): Primary | ICD-10-CM

## 2021-10-06 LAB — INR PPP: 3.9

## 2021-10-06 NOTE — PROGRESS NOTES
Anticoagulation Clinic Progress Note    Anticoagulation Summary  As of 10/6/2021    INR goal:  2.0-3.0   TTR:  65.9 % (2.8 y)   INR used for dosing:  3.90 (10/6/2021)   Warfarin maintenance plan:  4 mg every day   Weekly warfarin total:  28 mg   Plan last modified:  Alexander Valiente, PharmD (10/6/2021)   Next INR check:  10/20/2021   Priority:  Maintenance   Target end date:  Indefinite    Indications    Paroxysmal atrial fibrillation (HCC) [I48.0]             Anticoagulation Episode Summary     INR check location:      Preferred lab:      Send INR reminders to:   [facility]    Comments:  [facility] beginning 4/22/20      Anticoagulation Care Providers     Provider Role Specialty Phone number    Jonah Regalado Jr., MD Referring Cardiology [phone]          Clinic Interview:  Patient Findings     Negatives:  Signs/symptoms of thrombosis, Signs/symptoms of bleeding,   Laboratory test error suspected, Change in health, Change in alcohol use,   Change in activity, Upcoming invasive procedure, Emergency department   visit, Upcoming dental procedure, Missed doses, Extra doses, Change in   medications, Change in diet/appetite, Hospital admission, Bruising, Other   complaints      Clinical Outcomes     Negatives:  Major bleeding event, Thromboembolic event,   Anticoagulation-related hospital admission, Anticoagulation-related ED   visit, Anticoagulation-related fatality        INR History:  Anticoagulation Monitoring 9/10/2021 9/20/2021 10/6/2021   INR 2.40 3.10 3.90   INR Date 9/10/2021 9/20/2021 10/6/2021   INR Goal 2.0-3.0 2.0-3.0 2.0-3.0   Trend Same Same Down   Last Week Total 22 mg 30 mg 30 mg   Next Week Total 30 mg 28 mg 24 mg   Sun 4 mg 4 mg 4 mg   Mon 4 mg 4 mg 4 mg   Tue 4 mg 4 mg 4 mg   Wed 6 mg 4 mg (9/22); Otherwise 6 mg Hold (10/6); Otherwise 4 mg   Thu 4 mg 4 mg 4 mg   Fri 4 mg 4 mg 4 mg   Sat 4 mg 4 mg 4 mg   Visit Report - - -   Some recent data might be hidden       Plan:  1. INR  is Supratherapeutic today- see above in Anticoagulation Summary.   Will instruct Caitlyn TUCKER Rasabina to Change their warfarin regimen- see above in Anticoagulation Summary.  2. Follow up in 2 weeks (Pt unable to visit lab in 1 wk)  3. They have been instructed to call if any changes in medications, doses, concerns, etc. Patient expresses understanding and has no further questions at this time.    Alexander Valiente, PharmD

## 2021-10-12 ENCOUNTER — INFUSION (OUTPATIENT)
Dept: ONCOLOGY | Facility: HOSPITAL | Age: 86
End: 2021-10-12

## 2021-10-12 VITALS
BODY MASS INDEX: 35.97 KG/M2 | TEMPERATURE: 96.6 F | HEART RATE: 95 BPM | SYSTOLIC BLOOD PRESSURE: 106 MMHG | DIASTOLIC BLOOD PRESSURE: 63 MMHG | WEIGHT: 203 LBS | OXYGEN SATURATION: 95 %

## 2021-10-12 DIAGNOSIS — C91.12 CLL (CHRONIC LYMPHOID LEUKEMIA) IN RELAPSE (HCC): Primary | ICD-10-CM

## 2021-10-12 LAB
BASOPHILS # BLD AUTO: 0.01 10*3/MM3 (ref 0–0.2)
BASOPHILS NFR BLD AUTO: 0.1 % (ref 0–1.5)
DEPRECATED RDW RBC AUTO: 57.5 FL (ref 37–54)
EOSINOPHIL # BLD AUTO: 0 10*3/MM3 (ref 0–0.4)
EOSINOPHIL NFR BLD AUTO: 0 % (ref 0.3–6.2)
ERYTHROCYTE [DISTWIDTH] IN BLOOD BY AUTOMATED COUNT: 16.8 % (ref 12.3–15.4)
HCT VFR BLD AUTO: 36.9 % (ref 34–46.6)
HGB BLD-MCNC: 11 G/DL (ref 12–15.9)
IGA1 MFR SER: 15 MG/DL (ref 70–400)
IGG1 SER-MCNC: 798 MG/DL (ref 700–1600)
IGM SERPL-MCNC: 25 MG/DL (ref 40–230)
IMM GRANULOCYTES # BLD AUTO: 0.02 10*3/MM3 (ref 0–0.05)
IMM GRANULOCYTES NFR BLD AUTO: 0.2 % (ref 0–0.5)
LYMPHOCYTES # BLD AUTO: 3.99 10*3/MM3 (ref 0.7–3.1)
LYMPHOCYTES NFR BLD AUTO: 47.4 % (ref 19.6–45.3)
MCH RBC QN AUTO: 27.8 PG (ref 26.6–33)
MCHC RBC AUTO-ENTMCNC: 29.8 G/DL (ref 31.5–35.7)
MCV RBC AUTO: 93.4 FL (ref 79–97)
MONOCYTES # BLD AUTO: 0.26 10*3/MM3 (ref 0.1–0.9)
MONOCYTES NFR BLD AUTO: 3.1 % (ref 5–12)
NEUTROPHILS NFR BLD AUTO: 4.14 10*3/MM3 (ref 1.7–7)
NEUTROPHILS NFR BLD AUTO: 49.2 % (ref 42.7–76)
NRBC BLD AUTO-RTO: 0 /100 WBC (ref 0–0.2)
PLATELET # BLD AUTO: 209 10*3/MM3 (ref 140–450)
PMV BLD AUTO: 10.2 FL (ref 6–12)
RBC # BLD AUTO: 3.95 10*6/MM3 (ref 3.77–5.28)
WBC # BLD AUTO: 8.42 10*3/MM3 (ref 3.4–10.8)

## 2021-10-12 PROCEDURE — 96365 THER/PROPH/DIAG IV INF INIT: CPT

## 2021-10-12 PROCEDURE — 96366 THER/PROPH/DIAG IV INF ADDON: CPT

## 2021-10-12 PROCEDURE — 25010000002 IMMUNE GLOBULIN (HUMAN) 30 GM/300ML SOLUTION: Performed by: INTERNAL MEDICINE

## 2021-10-12 PROCEDURE — 85025 COMPLETE CBC W/AUTO DIFF WBC: CPT

## 2021-10-12 PROCEDURE — 82784 ASSAY IGA/IGD/IGG/IGM EACH: CPT | Performed by: INTERNAL MEDICINE

## 2021-10-12 RX ORDER — HYDROXYZINE PAMOATE 25 MG/1
25 CAPSULE ORAL ONCE
Status: COMPLETED | OUTPATIENT
Start: 2021-10-12 | End: 2021-10-12

## 2021-10-12 RX ORDER — ACETAMINOPHEN 325 MG/1
650 TABLET ORAL ONCE
Status: COMPLETED | OUTPATIENT
Start: 2021-10-12 | End: 2021-10-12

## 2021-10-12 RX ORDER — SODIUM CHLORIDE 9 MG/ML
250 INJECTION, SOLUTION INTRAVENOUS ONCE
Status: COMPLETED | OUTPATIENT
Start: 2021-10-12 | End: 2021-10-12

## 2021-10-12 RX ADMIN — ACETAMINOPHEN 650 MG: 325 TABLET ORAL at 10:44

## 2021-10-12 RX ADMIN — HYDROXYZINE PAMOATE 25 MG: 25 CAPSULE ORAL at 10:44

## 2021-10-12 RX ADMIN — IMMUNE GLOBULIN INFUSION (HUMAN) 30 G: 100 INJECTION, SOLUTION INTRAVENOUS; SUBCUTANEOUS at 11:08

## 2021-10-12 RX ADMIN — SODIUM CHLORIDE 250 ML: 9 INJECTION, SOLUTION INTRAVENOUS at 10:44

## 2021-10-20 ENCOUNTER — ANTICOAGULATION VISIT (OUTPATIENT)
Dept: PHARMACY | Facility: HOSPITAL | Age: 86
End: 2021-10-20

## 2021-10-20 DIAGNOSIS — I48.0 PAROXYSMAL ATRIAL FIBRILLATION (HCC): Primary | ICD-10-CM

## 2021-10-20 LAB — INR PPP: 2.9

## 2021-10-21 NOTE — PROGRESS NOTES
Anticoagulation Clinic Progress Note    Anticoagulation Summary  As of 10/20/2021    INR goal:  2.0-3.0   TTR:  65.1 % (2.9 y)   INR used for dosin.90 (10/20/2021)   Warfarin maintenance plan:  4 mg every day   Weekly warfarin total:  28 mg   No change documented:  Alexander Valiente PharmD   Plan last modified:  Alexander Valiente PharmD (10/6/2021)   Next INR check:  11/10/2021   Priority:  Maintenance   Target end date:  Indefinite    Indications    Paroxysmal atrial fibrillation (HCC) [I48.0]             Anticoagulation Episode Summary     INR check location:      Preferred lab:      Send INR reminders to:   [facility]    Comments:  [facility] beginning 20      Anticoagulation Care Providers     Provider Role Specialty Phone number    Jonah Regalado Jr., MD Referring Cardiology [phone]          Clinic Interview:  Patient Findings     Negatives:  Signs/symptoms of thrombosis, Signs/symptoms of bleeding,   Laboratory test error suspected, Change in health, Change in alcohol use,   Change in activity, Upcoming invasive procedure, Emergency department   visit, Upcoming dental procedure, Missed doses, Extra doses, Change in   medications, Change in diet/appetite, Hospital admission, Bruising, Other   complaints      Clinical Outcomes     Negatives:  Major bleeding event, Thromboembolic event,   Anticoagulation-related hospital admission, Anticoagulation-related ED   visit, Anticoagulation-related fatality        INR History:  Anticoagulation Monitoring 2021 10/6/2021 10/20/2021   INR 3.10 3.90 2.90   INR Date 2021 10/6/2021 10/20/2021   INR Goal 2.0-3.0 2.0-3.0 2.0-3.0   Trend Same Down Same   Last Week Total 30 mg 30 mg 28 mg   Next Week Total 28 mg 24 mg 28 mg   Sun 4 mg 4 mg 4 mg   Mon 4 mg 4 mg 4 mg   Tue 4 mg 4 mg 4 mg   Wed 4 mg (); Otherwise 6 mg Hold (10/6); Otherwise 4 mg 4 mg   Thu 4 mg 4 mg 4 mg   Fri 4 mg 4 mg 4 mg   Sat 4 mg 4 mg 4 mg   Visit Report - - -   Some  recent data might be hidden       Plan:  1. INR is Therapeutic today- see above in Anticoagulation Summary. Unsuccessful reaching pt by phone until 10/21/21.  Will instruct Caitlyn Longoria to Continue their warfarin regimen- see above in Anticoagulation Summary.  2. Follow up in 3 weeks (pt unable to return to lab sooner)  3. Pt has agreed to only be called if INR out of range. They have been instructed to call if any changes in medications, doses, concerns, etc. Patient expresses understanding and has no further questions at this time.    Alexander Valiente, PharmD

## 2021-10-24 ENCOUNTER — HOSPITAL ENCOUNTER (EMERGENCY)
Facility: HOSPITAL | Age: 86
Discharge: HOME | End: 2021-10-25
Attending: EMERGENCY MEDICINE | Admitting: EMERGENCY MEDICINE

## 2021-10-24 VITALS
HEART RATE: 82 BPM | OXYGEN SATURATION: 94 % | DIASTOLIC BLOOD PRESSURE: 61 MMHG | SYSTOLIC BLOOD PRESSURE: 110 MMHG | RESPIRATION RATE: 16 BRPM | TEMPERATURE: 98 F

## 2021-10-24 DIAGNOSIS — S80.12XA LEG HEMATOMA, LEFT, INITIAL ENCOUNTER: ICD-10-CM

## 2021-10-24 DIAGNOSIS — M79.605 LEFT LEG PAIN: Primary | ICD-10-CM

## 2021-10-24 PROCEDURE — 85025 COMPLETE CBC W/AUTO DIFF WBC: CPT | Performed by: EMERGENCY MEDICINE

## 2021-10-24 PROCEDURE — 85730 THROMBOPLASTIN TIME PARTIAL: CPT | Performed by: EMERGENCY MEDICINE

## 2021-10-24 PROCEDURE — 99283 EMERGENCY DEPT VISIT LOW MDM: CPT

## 2021-10-24 PROCEDURE — 85610 PROTHROMBIN TIME: CPT | Performed by: EMERGENCY MEDICINE

## 2021-10-24 PROCEDURE — 80053 COMPREHEN METABOLIC PANEL: CPT | Performed by: EMERGENCY MEDICINE

## 2021-10-24 PROCEDURE — 85007 BL SMEAR W/DIFF WBC COUNT: CPT | Performed by: EMERGENCY MEDICINE

## 2021-10-25 LAB
ALBUMIN SERPL-MCNC: 3.7 G/DL (ref 3.5–5.2)
ALBUMIN/GLOB SERPL: 1.3 G/DL
ALP SERPL-CCNC: 119 U/L (ref 39–117)
ALT SERPL W P-5'-P-CCNC: 34 U/L (ref 1–33)
ANION GAP SERPL CALCULATED.3IONS-SCNC: 13.4 MMOL/L (ref 5–15)
APTT PPP: 71.6 SECONDS (ref 22.7–35.4)
AST SERPL-CCNC: 49 U/L (ref 1–32)
BILIRUB SERPL-MCNC: 0.5 MG/DL (ref 0–1.2)
BUN SERPL-MCNC: 23 MG/DL (ref 8–23)
BUN/CREAT SERPL: 23 (ref 7–25)
CALCIUM SPEC-SCNC: 8.6 MG/DL (ref 8.6–10.5)
CHLORIDE SERPL-SCNC: 105 MMOL/L (ref 98–107)
CO2 SERPL-SCNC: 25.6 MMOL/L (ref 22–29)
CREAT SERPL-MCNC: 1 MG/DL (ref 0.57–1)
DEPRECATED RDW RBC AUTO: 52.4 FL (ref 37–54)
ERYTHROCYTE [DISTWIDTH] IN BLOOD BY AUTOMATED COUNT: 15.6 % (ref 12.3–15.4)
GFR SERPL CREATININE-BSD FRML MDRD: 52 ML/MIN/1.73
GLOBULIN UR ELPH-MCNC: 2.9 GM/DL
GLUCOSE SERPL-MCNC: 204 MG/DL (ref 65–99)
HCT VFR BLD AUTO: 38.4 % (ref 34–46.6)
HGB BLD-MCNC: 11.5 G/DL (ref 12–15.9)
INR PPP: 3.2 (ref 0.9–1.1)
LYMPHOCYTES # BLD MANUAL: 5 10*3/MM3 (ref 0.7–3.1)
LYMPHOCYTES NFR BLD MANUAL: 51.3 % (ref 19.6–45.3)
MCH RBC QN AUTO: 27.7 PG (ref 26.6–33)
MCHC RBC AUTO-ENTMCNC: 29.9 G/DL (ref 31.5–35.7)
MCV RBC AUTO: 92.5 FL (ref 79–97)
NEUTROPHILS # BLD AUTO: 4.74 10*3/MM3 (ref 1.7–7)
NEUTROPHILS NFR BLD MANUAL: 48.7 % (ref 42.7–76)
NRBC BLD AUTO-RTO: 0 /100 WBC (ref 0–0.2)
PLAT MORPH BLD: NORMAL
PLATELET # BLD AUTO: 188 10*3/MM3 (ref 140–450)
PMV BLD AUTO: 10.1 FL (ref 6–12)
POTASSIUM SERPL-SCNC: 3.5 MMOL/L (ref 3.5–5.2)
PROT SERPL-MCNC: 6.6 G/DL (ref 6–8.5)
PROTHROMBIN TIME: 32.5 SECONDS (ref 11.7–14.2)
RBC # BLD AUTO: 4.15 10*6/MM3 (ref 3.77–5.28)
RBC MORPH BLD: NORMAL
SMUDGE CELLS BLD QL SMEAR: ABNORMAL
SODIUM SERPL-SCNC: 144 MMOL/L (ref 136–145)
WBC # BLD AUTO: 9.74 10*3/MM3 (ref 3.4–10.8)

## 2021-10-25 NOTE — ED PROVIDER NOTES
EMERGENCY DEPARTMENT ENCOUNTER    Room Number:  16/16  Date of encounter:  10/25/2021  PCP: Zenaida Suarez MD  Historian: Patient      HPI:  Chief Complaint: Left lower extremity pain and swelling  A complete HPI/ROS/PMH/PSH/SH/FH are unobtainable due to: None    Context: Caitlyn Longoria is a 87 y.o. female who presents to the ED c/o moderate severity pain in the left lower extremity, anterior aspect.  There was no trauma to the area, and she first noticed it just a few hours ago.  She also noticed a purplish discoloration and generalized swelling.    Patient has no history of DVT or PE, she is chronically anticoagulated on warfarin, she does have history of CHF and CKD, but does not know of any injuries.  She is not short of breath and has no chest pain.  She has no fever.      PAST MEDICAL HISTORY  Active Ambulatory Problems     Diagnosis Date Noted   • Neck and shoulder pain 03/24/2017   • Arthropathy of shoulder region 03/24/2017   • Carpal tunnel syndrome of left wrist 03/24/2017   • Chronic pain of both shoulders 06/27/2017   • Chronic left shoulder pain 12/05/2017   • Arthropathy of left shoulder 12/05/2017   • Dysarthria 12/07/2017   • DM II (diabetes mellitus, type II), controlled ([facility]) 12/07/2017   • Paroxysmal atrial fibrillation ([facility]) 12/07/2017   • HLD (hyperlipidemia) 12/07/2017   • Chronic bronchitis ([facility]) 12/07/2017   • Coronary artery disease involving native coronary artery of native heart with angina pectoris ([facility]) 12/07/2017   • CVA (cerebral vascular accident) ([facility]) 12/10/2017   • HTN (hypertension) 01/14/2018   • CKD (chronic kidney disease), stage III ([facility]) 01/14/2018   • Chronic combined systolic and diastolic congestive heart failure ([facility]) 01/15/2018   • Infection of prosthetic right knee joint ([facility]) 01/17/2018   • Stasis dermatitis of right lower extremity due to peripheral venous hypertension 04/28/2018   • Current use of long term anticoagulation 04/28/2018   • Morbid  obesity ([facility]) 02/08/2019   • Acute respiratory failure with hypoxia ([facility]) 09/16/2019   • Rhinovirus 02/11/2020   • Asthma with acute exacerbation 02/11/2020   • Acute respiratory failure with hypoxemia ([facility]) 02/12/2020   • Viral pneumonia 04/23/2020   • Hypoxia 08/29/2020   • CLL (chronic lymphoid leukemia) in relapse ([facility]) 08/31/2020   • Dyspnea 09/29/2020   • Anticoagulated on Coumadin    • Osteoporotic compression fracture of spine ([facility]) 09/30/2020   • Pneumonia due to gram-negative bacteria ([facility]) 10/02/2020   • Pneumonia due to infectious organism 09/29/2020   • Bilateral carotid artery disease ([facility]) 10/28/2020   • Hypogammaglobulinemia ([facility]) 10/28/2020     Resolved Ambulatory Problems     Diagnosis Date Noted   • Hypernatremia 01/15/2018   • Shortness of breath 04/28/2020     Past Medical History:   Diagnosis Date   • Aortic calcification (CMS/[facility])    • Aortic regurgitation    • Asthma    • Atrial fibrillation (CMS/[facility])    • CAD (coronary artery disease)    • CKD (chronic kidney disease) stage 3, GFR 30-59 ml/min (CMS/[facility])    • Disc degeneration, lumbar    • DM type 2 (diabetes mellitus, type 2) (CMS/[facility])    • GERD (gastroesophageal reflux disease)    • History of aneurysm    • History of blood transfusion    • History of fracture    • History of vitamin D deficiency    • Hyperlipidemia    • Hypertension    • Mild mitral regurgitation    • Mitral annular calcification    • PAF (paroxysmal atrial fibrillation) (CMS/[facility])    • Peripheral neuropathy    • Skin cancer    • Sleep apnea    • SSS (sick sinus syndrome) (CMS/[facility])    • Stroke (cerebrum) (CMS/[facility])    • Tricuspid regurgitation          PAST SURGICAL HISTORY  Past Surgical History:   Procedure Laterality Date   • BRONCHOSCOPY Bilateral 10/6/2020    Procedure: BRONCHOSCOPY with BILATERAL LUNG washings;  Surgeon: Juno Coburn MD;  Location: [facility] ENDOSCOPY;  Service: Pulmonary;  Laterality: Bilateral;  PRE: purulent bronchitis  POST: PURULENT BRONCHITIS   •  BRONCHOSCOPY Bilateral 10/9/2020    Procedure: BRONCHOSCOPY with washing;  Surgeon: Juno Coburn MD;  Location: [facility] ENDOSCOPY;  Service: Pulmonary;  Laterality: Bilateral;  pre/post - mucous plug     • CARDIAC CATHETERIZATION     • CARDIAC ELECTROPHYSIOLOGY PROCEDURE N/A 2/7/2020    Procedure: PPM generator change - dual  medtronic;  Surgeon: Kiel Field MD;  Location: [facility] CATH INVASIVE LOCATION;  Service: Cardiology;  Laterality: N/A;   • CHOLECYSTECTOMY     • CORONARY STENT PLACEMENT     • HERNIA REPAIR      hital hernia   • HYSTERECTOMY     • PACEMAKER IMPLANTATION     • REPLACEMENT TOTAL KNEE Bilateral          FAMILY HISTORY  Family History   Problem Relation Age of Onset   • Heart disease Mother    • Hypertension Mother    • Colon polyps Mother    • Heart disease Father    • Hypertension Father    • Stroke Father    • Hypertension Brother    • Heart disease Brother    • Diabetes Niece    • Colon cancer Sister    • Hypertension Sister    • Hypertension Daughter    • Hypertension Son    • Hypertension Maternal Aunt    • Hypertension Maternal Grandmother    • Hypertension Maternal Grandfather    • Hypertension Paternal Grandmother    • Hypertension Paternal Grandfather          SOCIAL HISTORY  Social History     Socioeconomic History   • Marital status: Single   Tobacco Use   • Smoking status: Never Smoker   • Smokeless tobacco: Never Used   • Tobacco comment: caffeine use- soda   Substance and Sexual Activity   • Alcohol use: Never   • Drug use: No   • Sexual activity: Defer         ALLERGIES  Accupril [quinapril hcl], Ahist [chlorpheniramine], Clarithromycin, Esomeprazole, Levalbuterol, Levocetirizine, Lipitor [atorvastatin], Omeprazole, Pravachol [pravastatin], Sulindac, Valdecoxib, Chlorcyclizine, Diclofenac sodium, Diphenhydramine, Lodine [etodolac], and Sulfa antibiotics        REVIEW OF SYSTEMS  Review of Systems     All systems reviewed and negative except for those discussed in HPI.        PHYSICAL EXAM    I have reviewed the triage vital signs and nursing notes.    ED Triage Vitals [10/24/21 2133]   Temp Heart Rate Resp BP SpO2   98 °F (36.7 °C) 82 16 110/61 94 %      Temp src Heart Rate Source Patient Position BP Location FiO2 (%)   -- -- -- -- --       Physical Exam  GENERAL: not distressed  HENT: nares patent  EYES: no scleral icterus  CV: regular rhythm, regular rate  RESPIRATORY: normal effort  ABDOMEN: soft  MUSCULOSKELETAL: no deformity.  There is an area of ecchymosis and 2 cm round hematoma which is tender to palpation.  Is located on the anterior portion of the left lower leg, just medial to the tibia.  There is no fluctuance, its not warm to touch, there is no calf tenderness, and there is trace pedal edema bilaterally which is chronic and appearing  NEURO: alert, moves all extremities, follows commands  SKIN: warm, dry        LAB RESULTS  Recent Results (from the past 24 hour(s))   Comprehensive Metabolic Panel    Collection Time: 10/24/21 11:32 PM    Specimen: Blood   Result Value Ref Range    Glucose 204 (H) 65 - 99 mg/dL    BUN 23 8 - 23 mg/dL    Creatinine 1.00 0.57 - 1.00 mg/dL    Sodium 144 136 - 145 mmol/L    Potassium 3.5 3.5 - 5.2 mmol/L    Chloride 105 98 - 107 mmol/L    CO2 25.6 22.0 - 29.0 mmol/L    Calcium 8.6 8.6 - 10.5 mg/dL    Total Protein 6.6 6.0 - 8.5 g/dL    Albumin 3.70 3.50 - 5.20 g/dL    ALT (SGPT) 34 (H) 1 - 33 U/L    AST (SGOT) 49 (H) 1 - 32 U/L    Alkaline Phosphatase 119 (H) 39 - 117 U/L    Total Bilirubin 0.5 0.0 - 1.2 mg/dL    eGFR Non African Amer 52 (L) >60 mL/min/1.73    Globulin 2.9 gm/dL    A/G Ratio 1.3 g/dL    BUN/Creatinine Ratio 23.0 7.0 - 25.0    Anion Gap 13.4 5.0 - 15.0 mmol/L   aPTT    Collection Time: 10/24/21 11:32 PM    Specimen: Blood   Result Value Ref Range    PTT 71.6 (H) 22.7 - 35.4 seconds   Protime-INR    Collection Time: 10/24/21 11:32 PM    Specimen: Blood   Result Value Ref Range    Protime 32.5 (H) 11.7 - 14.2 Seconds    INR  3.20 (H) 0.90 - 1.10   CBC Auto Differential    Collection Time: 10/24/21 11:32 PM    Specimen: Blood   Result Value Ref Range    WBC 9.74 3.40 - 10.80 10*3/mm3    RBC 4.15 3.77 - 5.28 10*6/mm3    Hemoglobin 11.5 (L) 12.0 - 15.9 g/dL    Hematocrit 38.4 34.0 - 46.6 %    MCV 92.5 79.0 - 97.0 fL    MCH 27.7 26.6 - 33.0 pg    MCHC 29.9 (L) 31.5 - 35.7 g/dL    RDW 15.6 (H) 12.3 - 15.4 %    RDW-SD 52.4 37.0 - 54.0 fl    MPV 10.1 6.0 - 12.0 fL    Platelets 188 140 - 450 10*3/mm3    nRBC 0.0 0.0 - 0.2 /100 WBC   Manual Differential    Collection Time: 10/24/21 11:32 PM    Specimen: Blood   Result Value Ref Range    Neutrophil % 48.7 42.7 - 76.0 %    Lymphocyte % 51.3 (H) 19.6 - 45.3 %    Neutrophils Absolute 4.74 1.70 - 7.00 10*3/mm3    Lymphocytes Absolute 5.00 (H) 0.70 - 3.10 10*3/mm3    RBC Morphology Normal Normal    Smudge Cells Slight/1+ None Seen    Platelet Morphology Normal Normal       Ordered the above labs and independently reviewed the results.        RADIOLOGY  No Radiology Exams Resulted Within Past 24 Hours    I ordered the above noted radiological studies. Reviewed by me and discussed with radiologist.  See dictation for official radiology interpretation.      PROCEDURES    Procedures      MEDICATIONS GIVEN IN ER    Medications - No data to display      PROGRESS, DATA ANALYSIS, CONSULTS, AND MEDICAL DECISION MAKING    All labs have been independently reviewed by me.  All radiology studies have been reviewed by me and discussed with radiologist dictating the report.   EKG's independently viewed and interpreted by me.  Discussion below represents my analysis of pertinent findings related to patient's condition, differential diagnosis, treatment plan and final disposition.        ED Course as of 10/25/21 0034   Mon Oct 25, 2021   0033 CBC is unremarkable [DP]   0034 Chemistries unremarkable [DP]   0034 INR is therapeutic at 3.2 [DP]   0034 Patient's concerned about a blood clot, but I advised her that her INR  is 3.2 which makes it extremely unlikely, and the area of tenderness she has is on the anterior part of the lower leg. [DP]      ED Course User Index  [DP] Colin Amaral MD           PPE: The patient wore a surgical mask throughout the entire patient encounter. I wore an N95.    AS OF 00:34 EDT VITALS:    BP - 110/61  HR - 82  TEMP - 98 °F (36.7 °C)  O2 SATS - 94%        DIAGNOSIS  Final diagnoses:   Left leg pain   Leg hematoma, left, initial encounter         DISPOSITION  Discharge           Colin Amaral MD  10/25/21 0034

## 2021-10-25 NOTE — ED NOTES
Lives at [facility], baseline is A/O, ambulates normally.      Chelsy Smith, RN  10/24/21 9012

## 2021-11-09 ENCOUNTER — INFUSION (OUTPATIENT)
Dept: ONCOLOGY | Facility: HOSPITAL | Age: 86
End: 2021-11-09

## 2021-11-09 VITALS
SYSTOLIC BLOOD PRESSURE: 118 MMHG | BODY MASS INDEX: 35.79 KG/M2 | TEMPERATURE: 97.2 F | WEIGHT: 202 LBS | HEART RATE: 79 BPM | DIASTOLIC BLOOD PRESSURE: 71 MMHG | OXYGEN SATURATION: 94 % | RESPIRATION RATE: 18 BRPM

## 2021-11-09 DIAGNOSIS — C91.12 CLL (CHRONIC LYMPHOID LEUKEMIA) IN RELAPSE (HCC): Primary | ICD-10-CM

## 2021-11-09 LAB
BASOPHILS # BLD AUTO: 0.01 10*3/MM3 (ref 0–0.2)
BASOPHILS NFR BLD AUTO: 0.1 % (ref 0–1.5)
DEPRECATED RDW RBC AUTO: 56.3 FL (ref 37–54)
EOSINOPHIL # BLD AUTO: 0 10*3/MM3 (ref 0–0.4)
EOSINOPHIL NFR BLD AUTO: 0 % (ref 0.3–6.2)
ERYTHROCYTE [DISTWIDTH] IN BLOOD BY AUTOMATED COUNT: 16.5 % (ref 12.3–15.4)
HCT VFR BLD AUTO: 36.6 % (ref 34–46.6)
HGB BLD-MCNC: 10.9 G/DL (ref 12–15.9)
IGA1 MFR SER: 16 MG/DL (ref 70–400)
IGG1 SER-MCNC: 916 MG/DL (ref 700–1600)
IGM SERPL-MCNC: 31 MG/DL (ref 40–230)
IMM GRANULOCYTES # BLD AUTO: 0.02 10*3/MM3 (ref 0–0.05)
IMM GRANULOCYTES NFR BLD AUTO: 0.2 % (ref 0–0.5)
LYMPHOCYTES # BLD AUTO: 4.57 10*3/MM3 (ref 0.7–3.1)
LYMPHOCYTES NFR BLD AUTO: 51.6 % (ref 19.6–45.3)
MCH RBC QN AUTO: 27.4 PG (ref 26.6–33)
MCHC RBC AUTO-ENTMCNC: 29.8 G/DL (ref 31.5–35.7)
MCV RBC AUTO: 92 FL (ref 79–97)
MONOCYTES # BLD AUTO: 0.26 10*3/MM3 (ref 0.1–0.9)
MONOCYTES NFR BLD AUTO: 2.9 % (ref 5–12)
NEUTROPHILS NFR BLD AUTO: 3.99 10*3/MM3 (ref 1.7–7)
NEUTROPHILS NFR BLD AUTO: 45.2 % (ref 42.7–76)
NRBC BLD AUTO-RTO: 0 /100 WBC (ref 0–0.2)
PLATELET # BLD AUTO: 197 10*3/MM3 (ref 140–450)
PMV BLD AUTO: 10 FL (ref 6–12)
RBC # BLD AUTO: 3.98 10*6/MM3 (ref 3.77–5.28)
WBC # BLD AUTO: 8.85 10*3/MM3 (ref 3.4–10.8)

## 2021-11-09 PROCEDURE — 85025 COMPLETE CBC W/AUTO DIFF WBC: CPT

## 2021-11-09 PROCEDURE — 25010000002 IMMUNE GLOBULIN (HUMAN) 30 GM/300ML SOLUTION: Performed by: INTERNAL MEDICINE

## 2021-11-09 PROCEDURE — 96365 THER/PROPH/DIAG IV INF INIT: CPT

## 2021-11-09 PROCEDURE — 82784 ASSAY IGA/IGD/IGG/IGM EACH: CPT | Performed by: INTERNAL MEDICINE

## 2021-11-09 PROCEDURE — 96366 THER/PROPH/DIAG IV INF ADDON: CPT

## 2021-11-09 RX ORDER — FAMOTIDINE 10 MG/ML
20 INJECTION, SOLUTION INTRAVENOUS AS NEEDED
Status: CANCELLED | OUTPATIENT
Start: 2021-11-09

## 2021-11-09 RX ORDER — HYDROXYZINE PAMOATE 25 MG/1
25 CAPSULE ORAL ONCE
Status: COMPLETED | OUTPATIENT
Start: 2021-11-09 | End: 2021-11-09

## 2021-11-09 RX ORDER — MEPERIDINE HYDROCHLORIDE 50 MG/ML
25 INJECTION INTRAMUSCULAR; INTRAVENOUS; SUBCUTANEOUS
Status: CANCELLED | OUTPATIENT
Start: 2021-11-09 | End: 2021-11-10

## 2021-11-09 RX ORDER — DIPHENHYDRAMINE HYDROCHLORIDE 50 MG/ML
50 INJECTION INTRAMUSCULAR; INTRAVENOUS AS NEEDED
Status: CANCELLED | OUTPATIENT
Start: 2021-11-09

## 2021-11-09 RX ORDER — SODIUM CHLORIDE 9 MG/ML
250 INJECTION, SOLUTION INTRAVENOUS ONCE
Status: COMPLETED | OUTPATIENT
Start: 2021-11-09 | End: 2021-11-09

## 2021-11-09 RX ORDER — ACETAMINOPHEN 325 MG/1
650 TABLET ORAL ONCE
Status: COMPLETED | OUTPATIENT
Start: 2021-11-09 | End: 2021-11-09

## 2021-11-09 RX ADMIN — IMMUNE GLOBULIN INFUSION (HUMAN) 30 G: 100 INJECTION, SOLUTION INTRAVENOUS; SUBCUTANEOUS at 10:41

## 2021-11-09 RX ADMIN — SODIUM CHLORIDE 250 ML: 9 INJECTION, SOLUTION INTRAVENOUS at 10:16

## 2021-11-09 RX ADMIN — HYDROXYZINE PAMOATE 25 MG: 25 CAPSULE ORAL at 10:16

## 2021-11-09 RX ADMIN — ACETAMINOPHEN 650 MG: 325 TABLET ORAL at 10:16

## 2021-11-10 ENCOUNTER — ANTICOAGULATION VISIT (OUTPATIENT)
Dept: PHARMACY | Facility: HOSPITAL | Age: 86
End: 2021-11-10

## 2021-11-10 DIAGNOSIS — I48.0 PAROXYSMAL ATRIAL FIBRILLATION (HCC): Primary | ICD-10-CM

## 2021-11-10 LAB — INR PPP: 1.9

## 2021-11-10 NOTE — PROGRESS NOTES
Anticoagulation Clinic Progress Note    Anticoagulation Summary  As of 11/10/2021    INR goal:  2.0-3.0   TTR:  65.2 % (2.9 y)   INR used for dosin.90 (11/10/2021)   Warfarin maintenance plan:  2 mg every Tue, Thu; 4 mg all other days   Weekly warfarin total:  24 mg   Plan last modified:  Petra Oliveira RPH (11/10/2021)   Next INR check:  2021   Priority:  Maintenance   Target end date:  Indefinite    Indications    Paroxysmal atrial fibrillation (HCC) [I48.0]             Anticoagulation Episode Summary     INR check location:      Preferred lab:      Send INR reminders to:  [facility] POOL    Comments:  [facility] beginning 20      Anticoagulation Care Providers     Provider Role Specialty Phone number    Jonah Regalado Jr., MD Referring Cardiology [phone]          Clinic Interview:  Patient Findings     Positives:  Missed doses    Negatives:  Signs/symptoms of thrombosis, Signs/symptoms of bleeding,   Laboratory test error suspected, Change in health, Change in alcohol use,   Change in activity, Upcoming invasive procedure, Emergency department   visit, Upcoming dental procedure, Extra doses, Change in medications,   Change in diet/appetite, Hospital admission, Bruising, Other complaints    Comments:  Patient reports she was in ED on 10/24 with hematoma and high   INR and saw her PCP the next day who changed her dosing to hold then   alternate 4 and 2 mg.       Clinical Outcomes     Negatives:  Major bleeding event, Thromboembolic event,   Anticoagulation-related hospital admission, Anticoagulation-related ED   visit, Anticoagulation-related fatality    Comments:  Patient reports she was in ED on 10/24 with hematoma and high   INR and saw her PCP the next day who changed her dosing to hold then   alternate 4 and 2 mg.         INR History:  Anticoagulation Monitoring 10/6/2021 10/20/2021 11/10/2021   INR 3.90 2.90 1.90   INR Date 10/6/2021 10/20/2021 11/10/2021   INR Goal  2.0-3.0 2.0-3.0 2.0-3.0   Trend Down Same Down   Last Week Total 30 mg 28 mg 20 mg   Next Week Total 24 mg 28 mg 24 mg   Sun 4 mg 4 mg 4 mg   Mon 4 mg 4 mg 4 mg   Tue 4 mg 4 mg 2 mg   Wed Hold (10/6); Otherwise 4 mg 4 mg 4 mg   Thu 4 mg 4 mg 2 mg   Fri 4 mg 4 mg 4 mg   Sat 4 mg 4 mg 4 mg   Visit Report - - -   Some recent data might be hidden       Plan:  1. INR is Subtherapeutic today- see above in Anticoagulation Summary.   Will instruct Caitlyn Longoria to Increase their warfarin regimen- see above in Anticoagulation Summary.  2. Follow up in 2 weeks (recommened 1 week but the patient couldn't)   3. They have been instructed to call if any changes in medications, doses, concerns, etc. Patient expresses understanding and has no further questions at this time.    Petra Oliveira [facility]

## 2021-11-24 ENCOUNTER — ANTICOAGULATION VISIT (OUTPATIENT)
Dept: PHARMACY | Facility: HOSPITAL | Age: 86
End: 2021-11-24

## 2021-11-24 DIAGNOSIS — I48.0 PAROXYSMAL ATRIAL FIBRILLATION (HCC): Primary | ICD-10-CM

## 2021-11-24 LAB — INR PPP: 1.9

## 2021-11-24 NOTE — PROGRESS NOTES
Anticoagulation Clinic Progress Note    Anticoagulation Summary  As of 2021    INR goal:  2.0-3.0   TTR:  64.3 % (3 y)   INR used for dosin.90 (2021)   Warfarin maintenance plan:  2 mg every Tue; 4 mg all other days   Weekly warfarin total:  26 mg   Plan last modified:  Alexander Valiente, PharmD (2021)   Next INR check:  2021   Priority:  Maintenance   Target end date:  Indefinite    Indications    Paroxysmal atrial fibrillation (HCC) [I48.0]             Anticoagulation Episode Summary     INR check location:      Preferred lab:      Send INR reminders to:   [facility] POOL    Comments:  [facility] beginning 20      Anticoagulation Care Providers     Provider Role Specialty Phone number    Jonah Regalado Jr., MD Referring Cardiology [phone]          Clinic Interview:  Patient Findings     Negatives:  Signs/symptoms of thrombosis, Signs/symptoms of bleeding,   Laboratory test error suspected, Change in health, Change in alcohol use,   Change in activity, Upcoming invasive procedure, Emergency department   visit, Upcoming dental procedure, Missed doses, Extra doses, Change in   medications, Change in diet/appetite, Hospital admission, Bruising, Other   complaints    Comments:  Reports hematoma has resolved.      Clinical Outcomes     Negatives:  Major bleeding event, Thromboembolic event,   Anticoagulation-related hospital admission, Anticoagulation-related ED   visit, Anticoagulation-related fatality    Comments:  Reports hematoma has resolved.        INR History:  Anticoagulation Monitoring 10/20/2021 11/10/2021 2021   INR 2.90 1.90 1.90   INR Date 10/20/2021 11/10/2021 2021   INR Goal 2.0-3.0 2.0-3.0 2.0-3.0   Trend Same Down Up   Last Week Total 28 mg 20 mg 24 mg   Next Week Total 28 mg 24 mg 26 mg   Sun 4 mg 4 mg 4 mg   Mon 4 mg 4 mg 4 mg   Tue 4 mg 2 mg 2 mg   Wed 4 mg 4 mg 4 mg   Thu 4 mg 2 mg 4 mg   Fri 4 mg 4 mg 4 mg   Sat 4 mg 4 mg 4 mg   Visit  Report - - -   Some recent data might be hidden       Plan:  1. INR is Subtherapeutic today- see above in Anticoagulation Summary.   Will instruct Caitlyn GARRETT Longoria to Increase their warfarin regimen- see above in Anticoagulation Summary.  2. Follow up in 2 weeks  3. They have been instructed to call if any changes in medications, doses, concerns, etc. Patient expresses understanding and has no further questions at this time.    Alexander Valiente, PharmD

## 2021-12-07 ENCOUNTER — LAB (OUTPATIENT)
Dept: LAB | Facility: HOSPITAL | Age: 86
End: 2021-12-07

## 2021-12-07 ENCOUNTER — OFFICE VISIT (OUTPATIENT)
Dept: ONCOLOGY | Facility: CLINIC | Age: 86
End: 2021-12-07

## 2021-12-07 ENCOUNTER — INFUSION (OUTPATIENT)
Dept: ONCOLOGY | Facility: HOSPITAL | Age: 86
End: 2021-12-07

## 2021-12-07 VITALS — HEART RATE: 83 BPM | DIASTOLIC BLOOD PRESSURE: 85 MMHG | SYSTOLIC BLOOD PRESSURE: 152 MMHG

## 2021-12-07 VITALS
HEART RATE: 91 BPM | BODY MASS INDEX: 35.08 KG/M2 | DIASTOLIC BLOOD PRESSURE: 76 MMHG | RESPIRATION RATE: 20 BRPM | SYSTOLIC BLOOD PRESSURE: 108 MMHG | WEIGHT: 198 LBS | TEMPERATURE: 97.5 F | HEIGHT: 63 IN | OXYGEN SATURATION: 94 %

## 2021-12-07 DIAGNOSIS — C91.12 CLL (CHRONIC LYMPHOID LEUKEMIA) IN RELAPSE (HCC): ICD-10-CM

## 2021-12-07 DIAGNOSIS — C91.12 CLL (CHRONIC LYMPHOID LEUKEMIA) IN RELAPSE (HCC): Primary | ICD-10-CM

## 2021-12-07 LAB
BASOPHILS # BLD AUTO: 0.02 10*3/MM3 (ref 0–0.2)
BASOPHILS NFR BLD AUTO: 0.2 % (ref 0–1.5)
DEPRECATED RDW RBC AUTO: 53.1 FL (ref 37–54)
EOSINOPHIL # BLD AUTO: 0 10*3/MM3 (ref 0–0.4)
EOSINOPHIL NFR BLD AUTO: 0 % (ref 0.3–6.2)
ERYTHROCYTE [DISTWIDTH] IN BLOOD BY AUTOMATED COUNT: 16.3 % (ref 12.3–15.4)
HCT VFR BLD AUTO: 38.1 % (ref 34–46.6)
HGB BLD-MCNC: 12 G/DL (ref 12–15.9)
IGA1 MFR SER: 15 MG/DL (ref 70–400)
IGG1 SER-MCNC: 908 MG/DL (ref 700–1600)
IGM SERPL-MCNC: 30 MG/DL (ref 40–230)
IMM GRANULOCYTES # BLD AUTO: 0.04 10*3/MM3 (ref 0–0.05)
IMM GRANULOCYTES NFR BLD AUTO: 0.3 % (ref 0–0.5)
LYMPHOCYTES # BLD AUTO: 5.68 10*3/MM3 (ref 0.7–3.1)
LYMPHOCYTES NFR BLD AUTO: 46.1 % (ref 19.6–45.3)
MCH RBC QN AUTO: 28.1 PG (ref 26.6–33)
MCHC RBC AUTO-ENTMCNC: 31.5 G/DL (ref 31.5–35.7)
MCV RBC AUTO: 89.2 FL (ref 79–97)
MONOCYTES # BLD AUTO: 0.36 10*3/MM3 (ref 0.1–0.9)
MONOCYTES NFR BLD AUTO: 2.9 % (ref 5–12)
NEUTROPHILS NFR BLD AUTO: 50.5 % (ref 42.7–76)
NEUTROPHILS NFR BLD AUTO: 6.21 10*3/MM3 (ref 1.7–7)
NRBC BLD AUTO-RTO: 0 /100 WBC (ref 0–0.2)
PLATELET # BLD AUTO: 225 10*3/MM3 (ref 140–450)
PMV BLD AUTO: 10.2 FL (ref 6–12)
RBC # BLD AUTO: 4.27 10*6/MM3 (ref 3.77–5.28)
WBC NRBC COR # BLD: 12.31 10*3/MM3 (ref 3.4–10.8)

## 2021-12-07 PROCEDURE — 82784 ASSAY IGA/IGD/IGG/IGM EACH: CPT | Performed by: NURSE PRACTITIONER

## 2021-12-07 PROCEDURE — 96365 THER/PROPH/DIAG IV INF INIT: CPT

## 2021-12-07 PROCEDURE — 99213 OFFICE O/P EST LOW 20 MIN: CPT | Performed by: NURSE PRACTITIONER

## 2021-12-07 PROCEDURE — 25010000002 IMMUNE GLOBULIN (HUMAN) 30 GM/300ML SOLUTION: Performed by: NURSE PRACTITIONER

## 2021-12-07 PROCEDURE — 85025 COMPLETE CBC W/AUTO DIFF WBC: CPT

## 2021-12-07 PROCEDURE — 36415 COLL VENOUS BLD VENIPUNCTURE: CPT

## 2021-12-07 PROCEDURE — 96366 THER/PROPH/DIAG IV INF ADDON: CPT

## 2021-12-07 RX ORDER — ACETAMINOPHEN 325 MG/1
650 TABLET ORAL ONCE
Status: COMPLETED | OUTPATIENT
Start: 2021-12-07 | End: 2021-12-07

## 2021-12-07 RX ORDER — HYDROXYZINE PAMOATE 25 MG/1
25 CAPSULE ORAL ONCE
Status: COMPLETED | OUTPATIENT
Start: 2021-12-07 | End: 2021-12-07

## 2021-12-07 RX ORDER — SODIUM CHLORIDE 9 MG/ML
250 INJECTION, SOLUTION INTRAVENOUS ONCE
Status: COMPLETED | OUTPATIENT
Start: 2021-12-07 | End: 2021-12-07

## 2021-12-07 RX ORDER — DIPHENHYDRAMINE HYDROCHLORIDE 50 MG/ML
50 INJECTION INTRAMUSCULAR; INTRAVENOUS AS NEEDED
Status: CANCELLED | OUTPATIENT
Start: 2021-12-07

## 2021-12-07 RX ORDER — ACETAMINOPHEN 325 MG/1
650 TABLET ORAL ONCE
Status: CANCELLED | OUTPATIENT
Start: 2021-12-07

## 2021-12-07 RX ORDER — FAMOTIDINE 10 MG/ML
20 INJECTION, SOLUTION INTRAVENOUS AS NEEDED
Status: CANCELLED | OUTPATIENT
Start: 2021-12-07

## 2021-12-07 RX ORDER — CROMOLYN SODIUM 5.2 MG
AEROSOL, SPRAY WITH PUMP (ML) NASAL
COMMUNITY
End: 2022-01-14 | Stop reason: ALTCHOICE

## 2021-12-07 RX ORDER — GLIPIZIDE 5 MG/1
1 TABLET ORAL
COMMUNITY
Start: 2021-10-04 | End: 2023-03-22

## 2021-12-07 RX ORDER — HYDROXYZINE PAMOATE 25 MG/1
25 CAPSULE ORAL ONCE
Status: CANCELLED | OUTPATIENT
Start: 2021-12-07

## 2021-12-07 RX ORDER — METFORMIN HYDROCHLORIDE 500 MG/1
TABLET, EXTENDED RELEASE ORAL
COMMUNITY
Start: 2021-11-21

## 2021-12-07 RX ORDER — SODIUM CHLORIDE 9 MG/ML
250 INJECTION, SOLUTION INTRAVENOUS ONCE
Status: CANCELLED | OUTPATIENT
Start: 2021-12-07

## 2021-12-07 RX ADMIN — SODIUM CHLORIDE 250 ML: 9 INJECTION, SOLUTION INTRAVENOUS at 11:12

## 2021-12-07 RX ADMIN — ACETAMINOPHEN 650 MG: 325 TABLET ORAL at 10:45

## 2021-12-07 RX ADMIN — IMMUNE GLOBULIN INFUSION (HUMAN) 30 G: 100 INJECTION, SOLUTION INTRAVENOUS; SUBCUTANEOUS at 11:13

## 2021-12-07 RX ADMIN — HYDROXYZINE PAMOATE 25 MG: 25 CAPSULE ORAL at 10:45

## 2021-12-07 NOTE — PROGRESS NOTES
Subjective .     REASONS FOR FOLLOW UP:  1. chronic lymphocytic leukemia with recurrent lung infections    Referring MD:    Amarilis Suarez MD    History of Present Illness patient is an.age female with 2014 with stage 0 CLL which is never required treatments.    Over the last year however she has had multiple hospitalizations for lung infections predominantly viral probably also bacterial and at her last hospitalization in October we rechecked her gammaglobulins which were low and opted to start IV IgG monthly to see if this would keep her out of the hospital.    She has continued on monthly IVIG since October through March 2021.  The patient  happily reports she has had no infection since her March IVIG.  She reports feeling quite well with walking and doing exercise classes including yoga.  Her body aches are improving as is her energy level.    In September 2021 we resumed IVIG again for the winter months.  Patient has been a difficult venous stick and is questioning about a port today.  We discussed this could be an option we could discuss further with Dr. Larkin when it was a time.  She states they have been able to obtain an IV recently however that may be needed in the future.  She denies any recent fevers or infections.    No fever sweats or weight loss    Past Medical History:   Diagnosis Date   • Aortic calcification (HCC)     mild, on echo 12/17/2017   • Aortic regurgitation     Trace   • Asthma    • Atrial fibrillation ([facility])    • CAD (coronary artery disease)    • Chronic combined systolic and diastolic congestive heart failure (HCC)    • CKD (chronic kidney disease) stage 3, GFR 30-59 ml/min ([facility])    • Coronary artery disease involving native coronary artery of native heart with angina pectoris ([facility])    • Disc degeneration, lumbar    • DM type 2 (diabetes mellitus, type 2) ([facility])    • GERD (gastroesophageal reflux disease)    • History of aneurysm     right femoral artery s/p LHC   • History of blood  transfusion    • History of fracture    • History of vitamin D deficiency    • Hyperlipidemia    • Hypertension    • Mild mitral regurgitation    • Mitral annular calcification     12/8/2017- echo, moderate   • PAF (paroxysmal atrial fibrillation) (HCC)    • Peripheral neuropathy    • Skin cancer     Left hand   • Sleep apnea    • SSS (sick sinus syndrome) (HCC)    • Stroke (cerebrum) (HCC)    • Tricuspid regurgitation     Trace       ONCOLOGIC HISTORY:    [facility] HISTORY  Patient is an 85-year-old female whom I had seen in 2014 when she was hospitalized at [facility] and was diagnosed with chronic lymphocytic leukemia.  She has not been to see me in over 4 years and is following with Dr. Suarez her primary care doctor and her white count is gone up a little higher than usual to 22,000 and she is referred back to us for evaluation.  We are doing a telephone visit because of the coronavirus pandemic and her age and susceptibility.    When I originally saw her in 2014 she was brought into the ER unresponsive in septic shock on 03/25/2014 with methicillin-resistant staphylococcus identified in her blood cultures. The patient has been on antibiotic with improvement, but had some residual kidney damage with a creatinine in the 4-range requiring hemodialysis, and was noted on admission to have an elevated white count with lymphocytosis, normal hemoglobin, but a platelet count of 95,000, and over the course of the illness her platelet count normalized and the white count had gone up into the 20,000 range with lymphocytosis. A flow cytometry was sent which is consistent with CLL, she came to our office once or twice and then stopped coming because she was so stable     She tells me now she was hospitalized recently with rhinovirus infection and has had diagnosed with asthma and is having a lot of respiratory issues but does not require oxygen.She is at the [facility] in independent living and managing fairly well    She denies  fever sweats or weight loss.  Her last white count was on 5/26/2020  Showed a white count of 18,000 with 66 lymphs and 27 neutrophils platelet white hemoglobin is 12.6 platelet 188,000    I reassured Christopher that no treatment is indicated for this level of leukocytosis from CLL and if she has no systemic complaints I do not feel strongly about doing CAT scans at her age but I would like to physically examine her in the office in a couple of months to make sure there is no obvious adenopathy or hepatosplenomegaly.    She does have recurrent infections and we can check quantitative immunoglobulins to see how low they are as another adjunctive option to prevent recurrent infections if she has hypogammaglobulinemia    She remains on Coumadin for atrial fibrillation      Current Outpatient Medications on File Prior to Visit   Medication Sig Dispense Refill   • acetaminophen (TYLENOL) 325 MG tablet Take 2 tablets by mouth Every 4 (Four) Hours As Needed for Mild Pain .     • acetylcysteine (MUCOMYST) 20 % nebulizer solution Take 2 mL by nebulization 4 (Four) Times a Day.     • albuterol (PROVENTIL) (2.5 MG/3ML) 0.083% nebulizer solution Take 2.5 mg by nebulization Every 4 (Four) Hours.     • albuterol sulfate  (90 Base) MCG/ACT inhaler Inhale 2 puffs Every 4 (Four) Hours As Needed for Wheezing. 1 inhaler 3   • aspirin 81 MG tablet Take 81 mg by mouth Daily.     • Benralizumab (FASENRA SC) Inject  under the skin into the appropriate area as directed. Gets one shot a month, next shot may 13 then one every 8 weeks     • Calcium Carbonate-Vitamin D (calcium-vitamin D) 500-200 MG-UNIT tablet per tablet Take 1 tablet by mouth.     • carvedilol (COREG) 3.125 MG tablet TAKE ONE TABLET BY MOUTH TWICE A  tablet 2   • cephalexin (KEFLEX) 500 MG capsule Take 500 mg by mouth 3 (Three) Times a Day.     • cromolyn (NASALCHROM) 5.2 MG/ACT nasal spray into the nostril(s) as directed by provider.     • fluticasone (FLONASE) 50  MCG/ACT nasal spray 1 spray into the nostril(s) as directed by provider Daily.     • Fluticasone-Umeclidin-Vilant (Trelegy Ellipta) 100-62.5-25 MCG/INH aerosol powder  Inhale 1 puff Daily. As directed      • furosemide (LASIX) 20 MG tablet Take 20 mg by mouth Every Morning.     • glipizide (GLUCOTROL) 5 MG tablet Take 5 mg by mouth.     • glipizide (GLUCOTROL) 5 MG tablet Take 1 tablet by mouth 2 (Two) Times a Day Before Meals.     • guaiFENesin (MUCINEX) 600 MG 12 hr tablet Take 1 tablet by mouth Every 12 (Twelve) Hours.     • Loperamide HCl (IMODIUM PO) Take  by mouth Daily.     • LORazepam (ATIVAN) 0.5 MG tablet      • losartan (COZAAR) 25 MG tablet TAKE ONE TABLET BY MOUTH DAILY 90 tablet 3   • Melatonin 5 MG capsule Take  by mouth.     • melatonin 5 MG tablet tablet Take 5 mg by mouth Every Night.     • metFORMIN (GLUCOPHAGE) 500 MG tablet Take 500 mg by mouth Daily With Breakfast.     • metFORMIN ER (GLUCOPHAGE-XR) 500 MG 24 hr tablet      • montelukast (SINGULAIR) 10 MG tablet Take 10 mg by mouth Every Night.     • mupirocin (BACTROBAN) 2 % ointment      • nitrofurantoin, macrocrystal-monohydrate, (MACROBID) 100 MG capsule      • nystatin (MYCOSTATIN) 429505 UNIT/GM cream      • oxybutynin XL (DITROPAN-XL) 10 MG 24 hr tablet Take 10 mg by mouth every night at bedtime.     • polyethyl glycol-propyl glycol (LUBRICATING EYE DROPS) 0.4-0.3 % solution ophthalmic solution Administer 1 drop to both eyes Every 1 (One) Hour As Needed.     • rosuvastatin (CRESTOR) 20 MG tablet Take 20 mg by mouth Every Night.     • sodium chloride 0.65 % nasal spray 1 spray into the nostril(s) as directed by provider As Needed for Congestion.  12   • Tobramycin 300 MG/4ML nebulizer solution      • warfarin (COUMADIN) 4 MG tablet Take one and one-half tablets (6 mg) by mouth on Wed, and take one tablet (4 mg) by mouth all other days or as directed. 100 tablet 1   • [DISCONTINUED] trimethoprim (TRIMPEX) 100 MG tablet Take 100 mg by mouth  Daily. For UTI prophylaxis       No current facility-administered medications on file prior to visit.       ALLERGIES:     Allergies   Allergen Reactions   • Accupril [Quinapril Hcl] Swelling, Other (See Comments), GI Intolerance and Delirium     HA, constipation    • Ahist [Chlorpheniramine] Nausea Only, Other (See Comments) and Dizziness     Headache, Blurred vision   • Clarithromycin Nausea Only, Other (See Comments) and Mental Status Change     HA, Depression, Flushing   • Esomeprazole GI Intolerance   • Levalbuterol Swelling   • Levocetirizine Diarrhea and GI Intolerance   • Lipitor [Atorvastatin] Other (See Comments) and Myalgia     Dark urine   • Omeprazole Nausea Only and Other (See Comments)     HA   • Pravachol [Pravastatin] Nausea Only and GI Intolerance     Bloated, Constipation, HA   • Sulindac Other (See Comments) and Myalgia     HA, joint pain, bruising   • Valdecoxib Irritability   • Chlorcyclizine Unknown - Low Severity   • Diclofenac Sodium Unknown - Low Severity   • Diphenhydramine Unknown - Low Severity   • Lodine [Etodolac] Unknown - Low Severity   • Sulfa Antibiotics Unknown - Low Severity       Social History     Socioeconomic History   • Marital status: Single   Tobacco Use   • Smoking status: Never Smoker   • Smokeless tobacco: Never Used   • Tobacco comment: caffeine use- soda   Substance and Sexual Activity   • Alcohol use: Never   • Drug use: No   • Sexual activity: Defer         Cancer-related family history includes Colon cancer in her sister.     I have reviewed the patient's medical history in detail and updated the computerized patient record.    Review of Systems   Constitutional: Negative for appetite change, chills, diaphoresis, fatigue, fever and unexpected weight change.   HENT: Negative for mouth sores and sore throat.    Eyes: Negative for visual disturbance.   Respiratory: Positive for shortness of breath (stable). Negative for cough and wheezing.    Gastrointestinal:  Negative.  Negative for abdominal pain, diarrhea, nausea and vomiting.   Genitourinary: Negative.  Negative for dysuria and frequency.   Musculoskeletal: Positive for back pain.   Neurological: Negative.  Negative for dizziness and weakness.   Hematological: Does not bruise/bleed easily.   Psychiatric/Behavioral: Negative.  The patient is not nervous/anxious.    All other systems reviewed and are negative.  I have reviewed and confirmed the accuracy of the ROS as documented by the MA/LPN/RN KRISTIN Collazo      Objective      Vitals:    12/07/21 1015   BP: 108/76   Pulse:    Resp:    Temp:    SpO2:      Current Status 12/7/2021   ECOG score 2     Pain Score    12/07/21 0954   PainSc: 0-No pain       CONSTITUTIONAL:  Vital signs reviewed.  No distress, looks comfortable.  EYES:  Conjunctiva and lids unremarkable.  PERRLA  EARS,NOSE,MOUTH,THROAT: Hearing intact.  Lips, teeth, gums appear unremarkable.  RESPIRATORY:  Normal respiratory effort.  Lungs clear to auscultation on the right decreased breath breath sounds lower half of the left lung  CARDIOVASCULAR:  Normal S1, S2.  No murmurs rubs or gallops.  No significant lower extremity edema.  GASTROINTESTINAL: Abdomen appears unremarkable.  Nontender.  No hepatomegaly.  No splenomegaly.  LYMPHATIC:  No cervical, supraclavicular lymphadenopathy.  SKIN:  Warm.  No rashes.  Numerous ecchymosis on her arms and legs  PSYCHIATRIC:  Normal judgment and insight.  Normal mood and affect.   I have reexamined the patient 12/07/2021 and the results are consistent with the previously documented exam. KRISTIN Collazo       RECENT LABS:  Results from last 7 days   Lab Units 12/07/21  0947   WBC 10*3/mm3 12.31*   NEUTROS ABS 10*3/mm3 6.21   HEMOGLOBIN g/dL 12.0   HEMATOCRIT % 38.1   PLATELETS 10*3/mm3 225       IgG 9084, IgM 30, IgA 15.             Assessment/Plan    1. Chronic lymphocytic leukemia  -stage 0 since 2014 untreated    2.  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  The patient received IVIG treatments October 2020 through March 2021.  We have held IVIG since that time and the patient is happy to report no infections aside from some urinary tract infection which she has chronically.  Otherwise she has been feeling very well.  · Resume IV IgG from October through March 2022 monthly    3.  COPD/asthma /emphysema on home O2-recurrent respiratory infections with RSV and rhinovirus and bacteria    4.  Atrial fibrillation on Coumadin    5.  Hypertension/hypercholesterolemia/type 2 diabetes on treatment    6.  Vaccinated against coronavirus    7.  Mild anemia-continued to observe for now    Plan  1.  Proceed with IVIG today and continue monthly thereafter.  2.  Patient will notify the office for any fevers or infections.  3.  patient will return in 3 months for review by Dr. Larkin and IVIG.  4. No treatment indicated for CLL at this time        KRISTIN Collazo

## 2021-12-08 ENCOUNTER — ANTICOAGULATION VISIT (OUTPATIENT)
Dept: PHARMACY | Facility: HOSPITAL | Age: 86
End: 2021-12-08

## 2021-12-08 DIAGNOSIS — I48.0 PAROXYSMAL ATRIAL FIBRILLATION (HCC): Primary | ICD-10-CM

## 2021-12-08 LAB — INR PPP: 2.1

## 2021-12-08 NOTE — PROGRESS NOTES
Anticoagulation Clinic Progress Note    Anticoagulation Summary  As of 2021    INR goal:  2.0-3.0   TTR:  64.1 % (3 y)   INR used for dosin.10 (2021)   Warfarin maintenance plan:  2 mg every Tue; 4 mg all other days   Weekly warfarin total:  26 mg   No change documented:  Alexander Valiente PharmD   Plan last modified:  Alexander Valiente PharmD (2021)   Next INR check:  2021   Priority:  Maintenance   Target end date:  Indefinite    Indications    Paroxysmal atrial fibrillation (HCC) [I48.0]             Anticoagulation Episode Summary     INR check location:      Preferred lab:      [facility]:   [facility]    Comments:  [facility] beginning 20      Anticoagulation Care Providers     Provider Role Specialty Phone number    Jonah Regalado Jr., MD Referring Cardiology [phone]          Clinic Interview:  No pertinent clinical findings have been reported.    INR History:  Anticoagulation Monitoring 11/10/2021 2021 2021   INR 1.90 1.90 2.10   INR Date 11/10/2021 2021 2021   INR Goal 2.0-3.0 2.0-3.0 2.0-3.0   Trend Down Up Same   Last Week Total 20 mg 24 mg 26 mg   Next Week Total 24 mg 26 mg 26 mg   Sun 4 mg 4 mg 4 mg   Mon 4 mg 4 mg 4 mg   Tue 2 mg 2 mg 2 mg   Wed 4 mg 4 mg 4 mg   Thu 2 mg 4 mg 4 mg   Fri 4 mg 4 mg 4 mg   Sat 4 mg 4 mg 4 mg   Visit Report - - -   Some recent data might be hidden       Plan:  1. INR is therapeutic today- see above in Anticoagulation Summary.    Caitlyn Longoria to continue their warfarin regimen- see above in Anticoagulation Summary.  2. Follow up in 2 weeks  3. Unable to reach by phone after 3 attempts. Left secure voicemail with instructions and request for return call for confirmation of plan. They have been instructed to call if any changes in medications, doses, concerns, etc.     Alexander Valiente PharmD

## 2021-12-12 PROCEDURE — 93294 REM INTERROG EVL PM/LDLS PM: CPT | Performed by: INTERNAL MEDICINE

## 2021-12-12 PROCEDURE — 93296 REM INTERROG EVL PM/IDS: CPT | Performed by: INTERNAL MEDICINE

## 2021-12-27 ENCOUNTER — ANTICOAGULATION VISIT (OUTPATIENT)
Dept: PHARMACY | Facility: HOSPITAL | Age: 86
End: 2021-12-27

## 2021-12-27 DIAGNOSIS — I48.0 PAROXYSMAL ATRIAL FIBRILLATION (HCC): Primary | ICD-10-CM

## 2021-12-27 LAB — INR PPP: 1.7

## 2021-12-27 RX ORDER — WARFARIN SODIUM 4 MG/1
TABLET ORAL
Qty: 85 TABLET | Refills: 1 | Status: SHIPPED | OUTPATIENT
Start: 2021-12-27 | End: 2022-06-28 | Stop reason: SDUPTHER

## 2021-12-27 NOTE — PROGRESS NOTES
Anticoagulation Clinic Progress Note    Anticoagulation Summary  As of 2021    INR goal:  2.0-3.0   TTR:  63.5 % (3 y)   INR used for dosin.70 (2021)   Warfarin maintenance plan:  2 mg every Tue; 4 mg all other days   Weekly warfarin total:  26 mg   Plan last modified:  Alexander Valiente, PharmD (2021)   Next INR check:  1/10/2022   Priority:  Maintenance   Target end date:  Indefinite    Indications    Paroxysmal atrial fibrillation (HCC) [I48.0]             Anticoagulation Episode Summary     INR check location:      Preferred lab:      Send INR reminders to:   [facility] CLINICAL POOL    [facility]      Anticoagulation Care Providers     Provider Role Specialty Phone number    Jonah Regalado Jr., MD Referring Cardiology [phone]          Clinic Interview:  Patient Findings     Negatives:  Signs/symptoms of thrombosis, Signs/symptoms of bleeding,   Laboratory test error suspected, Change in health, Change in alcohol use,   Change in activity, Upcoming invasive procedure, Emergency department   visit, Upcoming dental procedure, Missed doses, Extra doses, Change in   medications, Change in diet/appetite, Hospital admission, Bruising, Other   complaints      Clinical Outcomes     Negatives:  Major bleeding event, Thromboembolic event,   Anticoagulation-related hospital admission, Anticoagulation-related ED   visit, Anticoagulation-related fatality        INR History:  Anticoagulation Monitoring 2021   INR 1.90 2.10 1.70   INR Date 2021   INR Goal 2.0-3.0 2.0-3.0 2.0-3.0   Trend Up Same Same   Last Week Total 24 mg 26 mg 26 mg   Next Week Total 26 mg 26 mg 28 mg   Sun 4 mg 4 mg 4 mg   Mon 4 mg 4 mg 4 mg   Tue 2 mg 2 mg 4 mg (); Otherwise 2 mg   Wed 4 mg 4 mg 4 mg   Thu 4 mg 4 mg 4 mg   Fri 4 mg 4 mg 4 mg   Sat 4 mg 4 mg 4 mg   Visit Report - - -   Some recent data might be hidden       Plan:  1. INR  is Subtherapeutic today- see above in Anticoagulation Summary.   Will instruct Caitlyn Longoria to Increase their warfarin regimen- see above in Anticoagulation Summary.  2. Follow up in 2 weeks  3. They have been instructed to call if any changes in medications, doses, concerns, etc. Patient expresses understanding and has no further questions at this time.    Petra Oliveira, [facility]

## 2022-01-04 ENCOUNTER — INFUSION (OUTPATIENT)
Dept: ONCOLOGY | Facility: HOSPITAL | Age: 87
End: 2022-01-04

## 2022-01-04 VITALS
RESPIRATION RATE: 16 BRPM | OXYGEN SATURATION: 95 % | HEART RATE: 96 BPM | BODY MASS INDEX: 35.25 KG/M2 | TEMPERATURE: 97.3 F | DIASTOLIC BLOOD PRESSURE: 75 MMHG | WEIGHT: 199 LBS | SYSTOLIC BLOOD PRESSURE: 111 MMHG

## 2022-01-04 DIAGNOSIS — C91.12 CLL (CHRONIC LYMPHOID LEUKEMIA) IN RELAPSE: Primary | ICD-10-CM

## 2022-01-04 LAB
BASOPHILS # BLD AUTO: 0.01 10*3/MM3 (ref 0–0.2)
BASOPHILS NFR BLD AUTO: 0.1 % (ref 0–1.5)
DEPRECATED RDW RBC AUTO: 56.3 FL (ref 37–54)
EOSINOPHIL # BLD AUTO: 0 10*3/MM3 (ref 0–0.4)
EOSINOPHIL NFR BLD AUTO: 0 % (ref 0.3–6.2)
ERYTHROCYTE [DISTWIDTH] IN BLOOD BY AUTOMATED COUNT: 16.8 % (ref 12.3–15.4)
HCT VFR BLD AUTO: 40.2 % (ref 34–46.6)
HGB BLD-MCNC: 12 G/DL (ref 12–15.9)
IMM GRANULOCYTES # BLD AUTO: 0.03 10*3/MM3 (ref 0–0.05)
IMM GRANULOCYTES NFR BLD AUTO: 0.3 % (ref 0–0.5)
LYMPHOCYTES # BLD AUTO: 5.22 10*3/MM3 (ref 0.7–3.1)
LYMPHOCYTES NFR BLD AUTO: 57.6 % (ref 19.6–45.3)
MCH RBC QN AUTO: 27.3 PG (ref 26.6–33)
MCHC RBC AUTO-ENTMCNC: 29.9 G/DL (ref 31.5–35.7)
MCV RBC AUTO: 91.6 FL (ref 79–97)
MONOCYTES # BLD AUTO: 0.22 10*3/MM3 (ref 0.1–0.9)
MONOCYTES NFR BLD AUTO: 2.4 % (ref 5–12)
NEUTROPHILS NFR BLD AUTO: 3.59 10*3/MM3 (ref 1.7–7)
NEUTROPHILS NFR BLD AUTO: 39.6 % (ref 42.7–76)
NRBC BLD AUTO-RTO: 0 /100 WBC (ref 0–0.2)
PLATELET # BLD AUTO: 172 10*3/MM3 (ref 140–450)
PMV BLD AUTO: 9.5 FL (ref 6–12)
RBC # BLD AUTO: 4.39 10*6/MM3 (ref 3.77–5.28)
WBC NRBC COR # BLD: 9.07 10*3/MM3 (ref 3.4–10.8)

## 2022-01-04 PROCEDURE — 96365 THER/PROPH/DIAG IV INF INIT: CPT

## 2022-01-04 PROCEDURE — 96366 THER/PROPH/DIAG IV INF ADDON: CPT

## 2022-01-04 PROCEDURE — 25010000002 IMMUNE GLOBULIN (HUMAN) 30 GM/300ML SOLUTION: Performed by: INTERNAL MEDICINE

## 2022-01-04 PROCEDURE — 85025 COMPLETE CBC W/AUTO DIFF WBC: CPT

## 2022-01-04 RX ORDER — HYDROXYZINE PAMOATE 25 MG/1
25 CAPSULE ORAL ONCE
Status: COMPLETED | OUTPATIENT
Start: 2022-01-04 | End: 2022-01-04

## 2022-01-04 RX ORDER — ACETAMINOPHEN 325 MG/1
650 TABLET ORAL ONCE
Status: COMPLETED | OUTPATIENT
Start: 2022-01-04 | End: 2022-01-04

## 2022-01-04 RX ORDER — SODIUM CHLORIDE 9 MG/ML
250 INJECTION, SOLUTION INTRAVENOUS ONCE
Status: COMPLETED | OUTPATIENT
Start: 2022-01-04 | End: 2022-01-04

## 2022-01-04 RX ADMIN — IMMUNE GLOBULIN INFUSION (HUMAN) 30 G: 100 INJECTION, SOLUTION INTRAVENOUS; SUBCUTANEOUS at 10:41

## 2022-01-04 RX ADMIN — ACETAMINOPHEN 650 MG: 325 TABLET ORAL at 10:23

## 2022-01-04 RX ADMIN — SODIUM CHLORIDE 250 ML: 9 INJECTION, SOLUTION INTRAVENOUS at 10:23

## 2022-01-04 RX ADMIN — HYDROXYZINE PAMOATE 25 MG: 25 CAPSULE ORAL at 10:24

## 2022-01-10 ENCOUNTER — ANTICOAGULATION VISIT (OUTPATIENT)
Dept: PHARMACY | Facility: HOSPITAL | Age: 87
End: 2022-01-10

## 2022-01-10 DIAGNOSIS — I48.0 PAROXYSMAL ATRIAL FIBRILLATION: Primary | ICD-10-CM

## 2022-01-10 LAB — INR PPP: 2.1

## 2022-01-10 NOTE — PROGRESS NOTES
Anticoagulation Clinic Progress Note    Anticoagulation Summary  As of 1/10/2022    INR goal:  2.0-3.0   TTR:  63.0 % (3.1 y)   INR used for dosin.10 (1/10/2022)   Warfarin maintenance plan:  2 mg every Tue; 4 mg all other days   Weekly warfarin total:  26 mg   No change documented:  Petra Oliveira RPH   Plan last modified:  Alexander Valiente, PharmD (2021)   Next INR check:  2022   Priority:  Maintenance   Target end date:  Indefinite    Indications    Paroxysmal atrial fibrillation (HCC) [I48.0]             Anticoagulation Episode Summary     INR check location:      Preferred lab:      [facility]:   [facility]    [facility]      Anticoagulation Care Providers     Provider Role Specialty Phone number    Jonah Regalado Jr., MD Referring Cardiology [phone]          Clinic Interview:  No pertinent clinical findings have been reported.    INR History:  Anticoagulation Monitoring 2021 2021 1/10/2022   INR 2.10 1.70 2.10   INR Date 2021 2021 1/10/2022   INR Goal 2.0-3.0 2.0-3.0 2.0-3.0   Trend Same Same Same   Last Week Total 26 mg 26 mg 26 mg   Next Week Total 26 mg 28 mg 26 mg   Sun 4 mg 4 mg 4 mg   Mon 4 mg 4 mg 4 mg   Tue 2 mg 4 mg (); Otherwise 2 mg 2 mg   Wed 4 mg 4 mg 4 mg   Thu 4 mg 4 mg 4 mg   Fri 4 mg 4 mg 4 mg   Sat 4 mg 4 mg 4 mg   Visit Report - - -   Some recent data might be hidden       Plan:  1. INR is therapeutic today- see above in Anticoagulation Summary.    Caitlyn TUCKER Von to continue their warfarin regimen- see above in Anticoagulation Summary.  2. Follow up in 2 weeks  3. They have been instructed to call if any changes in medications, doses, concerns, etc. Patient expresses understanding and has no further questions at this time.    Petra Oliveira RPH

## 2022-01-14 ENCOUNTER — CLINICAL SUPPORT NO REQUIREMENTS (OUTPATIENT)
Dept: CARDIOLOGY | Facility: CLINIC | Age: 87
End: 2022-01-14

## 2022-01-14 ENCOUNTER — OFFICE VISIT (OUTPATIENT)
Dept: CARDIOLOGY | Facility: CLINIC | Age: 87
End: 2022-01-14

## 2022-01-14 VITALS
BODY MASS INDEX: 35.08 KG/M2 | DIASTOLIC BLOOD PRESSURE: 70 MMHG | SYSTOLIC BLOOD PRESSURE: 100 MMHG | WEIGHT: 198 LBS | HEART RATE: 88 BPM | HEIGHT: 63 IN

## 2022-01-14 DIAGNOSIS — I48.0 PAF (PAROXYSMAL ATRIAL FIBRILLATION): ICD-10-CM

## 2022-01-14 DIAGNOSIS — Z95.0 PACEMAKER: ICD-10-CM

## 2022-01-14 DIAGNOSIS — E11.59 TYPE 2 DIABETES MELLITUS WITH OTHER CIRCULATORY COMPLICATION, WITHOUT LONG-TERM CURRENT USE OF INSULIN: ICD-10-CM

## 2022-01-14 DIAGNOSIS — I49.5 SSS (SICK SINUS SYNDROME): Primary | ICD-10-CM

## 2022-01-14 DIAGNOSIS — I49.5 SICK SINUS SYNDROME: Primary | ICD-10-CM

## 2022-01-14 DIAGNOSIS — I25.10 CORONARY ARTERY DISEASE INVOLVING NATIVE CORONARY ARTERY OF NATIVE HEART WITHOUT ANGINA PECTORIS: ICD-10-CM

## 2022-01-14 PROCEDURE — 99214 OFFICE O/P EST MOD 30 MIN: CPT | Performed by: NURSE PRACTITIONER

## 2022-01-14 PROCEDURE — 93280 PM DEVICE PROGR EVAL DUAL: CPT | Performed by: INTERNAL MEDICINE

## 2022-01-14 NOTE — PROGRESS NOTES
Date of Office Visit: 22  Encounter Provider: KRISTIN Frey  Place of Service: [facility] CARDIOLOGY  Patient Name: Caitlyn Longoria  :1934    Chief Complaint   Patient presents with   • Coronary artery disease involving native coronary artery of    • Atrial fibrillation and flutter (CMS/HCC)   • Sick sinus syndrome (CMS/HCC)   • Follow-up   :     HPI: Caitlyn Longoria is a 87 y.o. female  with  hypertension, hyperlipidemia, atrial fibrillation, coronary artery disease with history of stent, CVA, asthma, oropharyngeal dysphagia, chronic systolic and diastolic congestive heart failure, ischemic cardiomyopathy, chronic kidney disease stage III, and diabetes mellitus. She is followed by Dr. Veliz. I will see her in follow up today.      In 2007 it appeared that she had an anterior infarct.  She had an echocardiogram that confirmed this and ultimately had cardiac catheterization on 2008, which confirmed a left ventricular ejection fraction of 35%, total occlusion of the mid left anterior descending, severe disease of the large diagonal, and insignificant disease of the right coronary artery and circumflex.  She had angioplasty and drug-eluting stent placement of the diagonal.  Unfortunately, she developed a right femoral pseudoaneurysm and had to have that surgically repaired.  Follow-up echocardiogram in  showed left ventricular function have returned to normal.     In 2011 she presented with increasing shortness of breath and was found to have some congestive heart failure, atrial flutter, and marked bradycardia.  Echocardiogram at that time showed preserved LV function.  He had a dual-chamber pacemaker implanted.  She underwent atrial flutter ablation in 2011.     In 2012 she presented with dyspnea area she underwent cardiac catheterization which showed elevated right-sided pressures with a PA systolic pressure of 53mmHg, mean  31, and elevated wedge at 26.  She hadin-stent stenosis of the diagonal vessel.  The total occlusion of the mid LAD was unchanged.  The EF was approximately 50% and she underwent angioplasty and drug-eluting stent to the diagonal vessel, again.  Her medications were adjusted and then she developed dizziness so her Lasix was cut back.  She had a couple episodes of passing out that were attributed to low blood pressure     She then had the RV lead revised in October 2014. In October 2017 she had neuro changes.  CT of the head and neck show no evidence of acute infarction or hemorrhage.  She was noted to have small vessel ischemic disease and age appropriate atrophic she has significant dysarthria which resolved today prior to discharge and was started on Plavix.  She then had a conjunctival hemorrhage and was told to take aspirin 81 mg a remain on Coumadin instead.  Atherosclerotic disease involving the carotid bifurcation was noted bilaterally.  Echocardiogram in December 2017 showedseverely decreased left ventricular systolic function with calculated EF of 23.4%, grade 1 diastolic dysfunction, mild LVH, moderately thickened aortic valve, moderate MAC, and mild mitral valve regurgitation.  She was unable to have a MRI due to her pacer.  In January 2018, she had sepsis of the right knee joint. ID recommended 6 weeks of IV antibiotics and she was discharged with a PICC line.   She then developed lymphedema.     She had repeat echo in 05/2018 which showed left ventricular ejection fraction of 35% - 40%.    She later moved into an apartment community.  In August 2018 she had been taken off Bumex and put on Lasix due to worsening renal function.  In September 2018 she was hospitalized for exacerbation of her lung disease.  She was treated with bronchodilators, mucolytic's, IV antibiotic attics with Pseudomonas coverage and ultimately had repeat biopsy and began to improve.      She presents for 6-month follow-up.  In  "September her losartan was stopped due to low blood pressure.  She changed her diet and she is lost 10 to 12 pounds over the last 6 months.  She complains of superficial bruising but no blood in her urine or stool.  She denies chest pain tightness or pressure and she has no near-syncope or syncope.        Allergies   Allergen Reactions   • Accupril [Quinapril Hcl] Swelling, Other (See Comments), GI Intolerance and Delirium     HA, constipation    • Ahist [Chlorpheniramine] Nausea Only, Other (See Comments) and Dizziness     Headache, Blurred vision   • Clarithromycin Nausea Only, Other (See Comments) and Mental Status Change     HA, Depression, Flushing   • Esomeprazole GI Intolerance   • Levalbuterol Swelling   • Levocetirizine Diarrhea and GI Intolerance   • Lipitor [Atorvastatin] Other (See Comments) and Myalgia     Dark urine   • Omeprazole Nausea Only and Other (See Comments)     HA   • Pravachol [Pravastatin] Nausea Only and GI Intolerance     Bloated, Constipation, HA   • Sulindac Other (See Comments) and Myalgia     HA, joint pain, bruising   • Valdecoxib Irritability   • Chlorcyclizine Unknown - Low Severity   • Diclofenac Sodium Unknown - Low Severity   • Diphenhydramine Unknown - Low Severity   • Lodine [Etodolac] Unknown - Low Severity   • Sulfa Antibiotics Unknown - Low Severity           Family and social history reviewed.     ROS  All other systems were reviewed and are negative          Objective:     Vitals:    01/14/22 0954   BP: 100/70   BP Location: Left arm   Patient Position: Sitting   Pulse: 88   Weight: 89.8 kg (198 lb)   Height: 160 cm (63\")     Body mass index is 35.07 kg/m².    PHYSICAL EXAM:  Cardiovascular:      Regular rhythm.   Edema:     Ankle: bilateral 1+ edema of the ankle.        Procedures      Current Outpatient Medications   Medication Sig Dispense Refill   • acetaminophen (TYLENOL) 325 MG tablet Take 2 tablets by mouth Every 4 (Four) Hours As Needed for Mild Pain .     • " acetylcysteine (MUCOMYST) 20 % nebulizer solution Take 2 mL by nebulization 4 (Four) Times a Day.     • albuterol (PROVENTIL) (2.5 MG/3ML) 0.083% nebulizer solution Take 2.5 mg by nebulization Every 4 (Four) Hours.     • albuterol sulfate  (90 Base) MCG/ACT inhaler Inhale 2 puffs Every 4 (Four) Hours As Needed for Wheezing. 1 inhaler 3   • aspirin 81 MG tablet Take 81 mg by mouth Daily.     • Benralizumab (FASENRA SC) Inject  under the skin into the appropriate area as directed. Gets one shot a month, next shot may 13 then one every 8 weeks     • Calcium Carbonate-Vitamin D (calcium-vitamin D) 500-200 MG-UNIT tablet per tablet Take 1 tablet by mouth.     • carvedilol (COREG) 3.125 MG tablet TAKE ONE TABLET BY MOUTH TWICE A  tablet 2   • cephalexin (KEFLEX) 500 MG capsule Take 500 mg by mouth 3 (Three) Times a Day.     • fluticasone (FLONASE) 50 MCG/ACT nasal spray 1 spray into the nostril(s) as directed by provider Daily.     • Fluticasone-Umeclidin-Vilant (Trelegy Ellipta) 100-62.5-25 MCG/INH aerosol powder  Inhale 1 puff Daily. As directed      • furosemide (LASIX) 20 MG tablet Take 20 mg by mouth Every Morning.     • glipizide (GLUCOTROL) 5 MG tablet Take 5 mg by mouth. Take one half tablet by mouth daily     • glipizide (GLUCOTROL) 5 MG tablet Take 1 tablet by mouth 2 (Two) Times a Day Before Meals.     • guaiFENesin (MUCINEX) 600 MG 12 hr tablet Take 1 tablet by mouth Every 12 (Twelve) Hours.     • Loperamide HCl (IMODIUM PO) Take  by mouth Daily.     • LORazepam (ATIVAN) 0.5 MG tablet      • Melatonin 5 MG capsule Take  by mouth.     • melatonin 5 MG tablet tablet Take 5 mg by mouth Every Night.     • metFORMIN ER (GLUCOPHAGE-XR) 500 MG 24 hr tablet      • montelukast (SINGULAIR) 10 MG tablet Take 10 mg by mouth Every Night.     • mupirocin (BACTROBAN) 2 % ointment      • nystatin (MYCOSTATIN) 350033 UNIT/GM cream      • oxybutynin XL (DITROPAN-XL) 10 MG 24 hr tablet Take 10 mg by mouth every  night at bedtime.     • polyethyl glycol-propyl glycol (LUBRICATING EYE DROPS) 0.4-0.3 % solution ophthalmic solution Administer 1 drop to both eyes Every 1 (One) Hour As Needed.     • rosuvastatin (CRESTOR) 20 MG tablet Take 20 mg by mouth Every Night.     • Tobramycin 300 MG/4ML nebulizer solution      • warfarin (COUMADIN) 4 MG tablet TAKE 1/2 TABLET BY MOUTH ON Tuesday AND TAKE ONE TABLET BY MOUTH ALL OTHER DAYS OR AS DIRECTED 85 tablet 1   • cromolyn (NASALCHROM) 5.2 MG/ACT nasal spray into the nostril(s) as directed by provider.     • metFORMIN (GLUCOPHAGE) 500 MG tablet Take 500 mg by mouth Daily With Breakfast.       No current facility-administered medications for this visit.     Assessment:       Diagnosis Plan   1. Sick sinus syndrome (HCC)     2. PAF (paroxysmal atrial fibrillation) ([facility])     3. Coronary artery disease involving native coronary artery of native heart without angina pectoris     4. Pacemaker     5. Type 2 diabetes mellitus with other circulatory complication, without long-term current use of insulin ([facility])          No orders of the defined types were placed in this encounter.        Plan:       1.87 year old female with history of coronary disease involving the mid left anterior descending with total occlusion of that.  First diagonal vessel with severe disease status post angioplasty and drug-eluting stent placement.  Follow-up In October 2012 showed restenosis of the diagonal vessel and repeat angioplasty and drug-eluting stent placement was completed.  EF was approximately 50% with segmental wall motion abnormality.  Echocardiogram in December 2017 was 23.4% she was started on carvedilol and losartan.  Echocardiogram May 2018 showed improvement to 35-40%.    - no angina  2.  Atrial fibrillation/sick sinus syndrome status post permanent pacemaker implantation status post flutter ablation status post RV lead revision in 2014 then generator change 02/2020.  follows in our device clinic. No  major bleeding issues on warfarin followed by our anticoagulation clinic  3.  History of CVA in December 2000 likely embolic from atrial fibrillation she was intolerant to Plavix. She continues on ASA  4.  Diabetes mellitus hemoglobin A1c goal 8.0 or less.  She is at 7.2 last check  5.  Hyperlipidemia on target dose rosuvastatin 20 mg  6.  Obstructive sleep apnea reports compliance on CPAP followed by Dr. Coburn  7.  History of pulmonary hypertension   8.  Obesity BMI   35.07  9. History of syncope secondary to hypotension no recurrence  10.  Ischemic cardiomyopathy left ventricular ejection fraction improved from 23 %to 35-40% in 05/2018.  She has had no significant weight gain and appears euvolemic  11.  Chronic kidney disease on lasix 20 mg and volume status now stable. Switched off Bumex in 2018 for worsening renal function.    12.  Bilateral carotid plaque without stenosis on duplex and 12/2017  13. 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  Oropharyngeal dysphasia mild on video swallow August 2019 unchanged from 2016.   15.  Status post bronchoscopy times 2 October 2020 with copious lavage this greatly improved her symptoms she follows with pulmonary      Follow-up in 6 months call questions or concerns            It has been a pleasure to participate in this patient's care.      Thank you,  KRISTIN Frey      **I used Dragon to dictate this note:**

## 2022-01-24 ENCOUNTER — ANTICOAGULATION VISIT (OUTPATIENT)
Dept: PHARMACY | Facility: HOSPITAL | Age: 87
End: 2022-01-24

## 2022-01-24 DIAGNOSIS — I48.0 PAROXYSMAL ATRIAL FIBRILLATION: Primary | ICD-10-CM

## 2022-01-24 LAB — INR PPP: 2.2

## 2022-01-25 NOTE — PROGRESS NOTES
Anticoagulation Clinic Progress Note    Anticoagulation Summary  As of 2022    INR goal:  2.0-3.0   TTR:  63.4 % (3.1 y)   INR used for dosin.20 (2022)   Warfarin maintenance plan:  2 mg every Tue; 4 mg all other days   Weekly warfarin total:  26 mg   No change documented:  Petra Oliveira RPH   Plan last modified:  Alexander Valiente, PharmD (2021)   Next INR check:  2022   Priority:  Maintenance   Target end date:  Indefinite    Indications    Paroxysmal atrial fibrillation (HCC) [I48.0]             Anticoagulation Episode Summary     INR check location:      Preferred lab:      [facility]ers [facility]:   [facility]    Comments:  [facility] lab beginning 20      Anticoagulation Care Providers     Provider Role Specialty Phone number    Jonah Regalado Jr., MD Referring Cardiology [phone]          Clinic Interview:  Patient Findings     Negatives:  Signs/symptoms of thrombosis, Signs/symptoms of bleeding,   Laboratory test error suspected, Change in health, Change in alcohol use,   Change in activity, Upcoming invasive procedure, Emergency department   visit, Upcoming dental procedure, Missed doses, Extra doses, Change in   medications, Change in diet/appetite, Hospital admission, Bruising, Other   complaints      Clinical Outcomes     Negatives:  Major bleeding event, Thromboembolic event,   Anticoagulation-related hospital admission, Anticoagulation-related ED   visit, Anticoagulation-related fatality        INR History:  Anticoagulation Monitoring 2021 1/10/2022 2022   INR 1.70 2.10 2.20   INR Date 2021 1/10/2022 2022   INR Goal 2.0-3.0 2.0-3.0 2.0-3.0   Trend Same Same Same   Last Week Total 26 mg 26 mg 26 mg   Next Week Total 28 mg 26 mg 26 mg   Sun 4 mg 4 mg 4 mg   Mon 4 mg 4 mg 4 mg   Tue 4 mg (); Otherwise 2 mg 2 mg 2 mg   Wed 4 mg 4 mg 4 mg   Thu 4 mg 4 mg 4 mg   Fri 4 mg 4 mg 4 mg   Sat 4 mg 4 mg 4 mg   Visit Report - - -   Some  recent data might be hidden       Plan:  1. INR is Therapeutic today- see above in Anticoagulation Summary.   Will instruct Caitlyn Longoria to Continue their warfarin regimen- see above in Anticoagulation Summary.  2. Follow up in 3 weeks  3. They have been instructed to call if any changes in medications, doses, concerns, etc. Patient expresses understanding and has no further questions at this time.    Petra Oliveira, [facility]

## 2022-01-28 DIAGNOSIS — C91.12 CLL (CHRONIC LYMPHOID LEUKEMIA) IN RELAPSE: Primary | ICD-10-CM

## 2022-02-01 ENCOUNTER — INFUSION (OUTPATIENT)
Dept: ONCOLOGY | Facility: HOSPITAL | Age: 87
End: 2022-02-01

## 2022-02-01 VITALS
HEART RATE: 81 BPM | RESPIRATION RATE: 18 BRPM | WEIGHT: 197.4 LBS | TEMPERATURE: 97 F | BODY MASS INDEX: 34.97 KG/M2 | SYSTOLIC BLOOD PRESSURE: 123 MMHG | OXYGEN SATURATION: 96 % | DIASTOLIC BLOOD PRESSURE: 73 MMHG

## 2022-02-01 DIAGNOSIS — C91.12 CLL (CHRONIC LYMPHOID LEUKEMIA) IN RELAPSE: Primary | ICD-10-CM

## 2022-02-01 LAB
BASOPHILS # BLD AUTO: 0.01 10*3/MM3 (ref 0–0.2)
BASOPHILS NFR BLD AUTO: 0.1 % (ref 0–1.5)
DEPRECATED RDW RBC AUTO: 61.1 FL (ref 37–54)
EOSINOPHIL # BLD AUTO: 0 10*3/MM3 (ref 0–0.4)
EOSINOPHIL NFR BLD AUTO: 0 % (ref 0.3–6.2)
ERYTHROCYTE [DISTWIDTH] IN BLOOD BY AUTOMATED COUNT: 18 % (ref 12.3–15.4)
HCT VFR BLD AUTO: 38.2 % (ref 34–46.6)
HGB BLD-MCNC: 11.8 G/DL (ref 12–15.9)
IGA1 MFR SER: 16 MG/DL (ref 70–400)
IGG1 SER-MCNC: 939 MG/DL (ref 700–1600)
IGM SERPL-MCNC: 29 MG/DL (ref 40–230)
IMM GRANULOCYTES # BLD AUTO: 0.01 10*3/MM3 (ref 0–0.05)
IMM GRANULOCYTES NFR BLD AUTO: 0.1 % (ref 0–0.5)
LYMPHOCYTES # BLD AUTO: 4.79 10*3/MM3 (ref 0.7–3.1)
LYMPHOCYTES NFR BLD AUTO: 54.4 % (ref 19.6–45.3)
MCH RBC QN AUTO: 28.5 PG (ref 26.6–33)
MCHC RBC AUTO-ENTMCNC: 30.9 G/DL (ref 31.5–35.7)
MCV RBC AUTO: 92.3 FL (ref 79–97)
MONOCYTES # BLD AUTO: 0.25 10*3/MM3 (ref 0.1–0.9)
MONOCYTES NFR BLD AUTO: 2.8 % (ref 5–12)
NEUTROPHILS NFR BLD AUTO: 3.75 10*3/MM3 (ref 1.7–7)
NEUTROPHILS NFR BLD AUTO: 42.6 % (ref 42.7–76)
NRBC BLD AUTO-RTO: 0 /100 WBC (ref 0–0.2)
PLATELET # BLD AUTO: 164 10*3/MM3 (ref 140–450)
PMV BLD AUTO: 10.3 FL (ref 6–12)
RBC # BLD AUTO: 4.14 10*6/MM3 (ref 3.77–5.28)
WBC NRBC COR # BLD: 8.81 10*3/MM3 (ref 3.4–10.8)

## 2022-02-01 PROCEDURE — 96366 THER/PROPH/DIAG IV INF ADDON: CPT

## 2022-02-01 PROCEDURE — 82784 ASSAY IGA/IGD/IGG/IGM EACH: CPT | Performed by: INTERNAL MEDICINE

## 2022-02-01 PROCEDURE — 96365 THER/PROPH/DIAG IV INF INIT: CPT

## 2022-02-01 PROCEDURE — 25010000002 IMMUNE GLOBULIN (HUMAN) 30 GM/300ML SOLUTION: Performed by: INTERNAL MEDICINE

## 2022-02-01 PROCEDURE — 85025 COMPLETE CBC W/AUTO DIFF WBC: CPT

## 2022-02-01 RX ORDER — HYDROXYZINE PAMOATE 25 MG/1
25 CAPSULE ORAL ONCE
Status: COMPLETED | OUTPATIENT
Start: 2022-02-01 | End: 2022-02-01

## 2022-02-01 RX ORDER — ACETAMINOPHEN 325 MG/1
650 TABLET ORAL ONCE
Status: COMPLETED | OUTPATIENT
Start: 2022-02-01 | End: 2022-02-01

## 2022-02-01 RX ORDER — SODIUM CHLORIDE 9 MG/ML
250 INJECTION, SOLUTION INTRAVENOUS ONCE
Status: COMPLETED | OUTPATIENT
Start: 2022-02-01 | End: 2022-02-01

## 2022-02-01 RX ADMIN — IMMUNE GLOBULIN INFUSION (HUMAN) 30 G: 100 INJECTION, SOLUTION INTRAVENOUS; SUBCUTANEOUS at 10:32

## 2022-02-01 RX ADMIN — SODIUM CHLORIDE 250 ML: 9 INJECTION, SOLUTION INTRAVENOUS at 10:32

## 2022-02-01 RX ADMIN — ACETAMINOPHEN 650 MG: 325 TABLET ORAL at 10:03

## 2022-02-01 RX ADMIN — HYDROXYZINE PAMOATE 25 MG: 25 CAPSULE ORAL at 10:03

## 2022-02-07 RX ORDER — CEPHALEXIN 500 MG/1
500 CAPSULE ORAL EVERY 8 HOURS
Qty: 270 CAPSULE | Refills: 3 | Status: SHIPPED | OUTPATIENT
Start: 2022-02-07 | End: 2022-05-08

## 2022-02-08 ENCOUNTER — APPOINTMENT (OUTPATIENT)
Dept: GENERAL RADIOLOGY | Facility: HOSPITAL | Age: 87
End: 2022-02-08

## 2022-02-08 ENCOUNTER — HOSPITAL ENCOUNTER (EMERGENCY)
Facility: HOSPITAL | Age: 87
Discharge: HOME | End: 2022-02-08
Attending: EMERGENCY MEDICINE | Admitting: EMERGENCY MEDICINE

## 2022-02-08 VITALS
OXYGEN SATURATION: 96 % | WEIGHT: 197 LBS | DIASTOLIC BLOOD PRESSURE: 76 MMHG | SYSTOLIC BLOOD PRESSURE: 120 MMHG | BODY MASS INDEX: 34.91 KG/M2 | TEMPERATURE: 97.7 F | HEIGHT: 63 IN | HEART RATE: 80 BPM | RESPIRATION RATE: 17 BRPM

## 2022-02-08 DIAGNOSIS — M75.52 SUBACROMIAL BURSITIS OF LEFT SHOULDER JOINT: Primary | ICD-10-CM

## 2022-02-08 DIAGNOSIS — Z79.01 CHRONIC ANTICOAGULATION: ICD-10-CM

## 2022-02-08 DIAGNOSIS — E11.65 HYPERGLYCEMIA DUE TO DIABETES MELLITUS: ICD-10-CM

## 2022-02-08 LAB
ANION GAP SERPL CALCULATED.3IONS-SCNC: 14.3 MMOL/L (ref 5–15)
BASOPHILS # BLD AUTO: 0.01 10*3/MM3 (ref 0–0.2)
BASOPHILS NFR BLD AUTO: 0.1 % (ref 0–1.5)
BUN SERPL-MCNC: 22 MG/DL (ref 8–23)
BUN/CREAT SERPL: 24.7 (ref 7–25)
CALCIUM SPEC-SCNC: 8.6 MG/DL (ref 8.6–10.5)
CHLORIDE SERPL-SCNC: 102 MMOL/L (ref 98–107)
CO2 SERPL-SCNC: 21.7 MMOL/L (ref 22–29)
CREAT SERPL-MCNC: 0.89 MG/DL (ref 0.57–1)
DEPRECATED RDW RBC AUTO: 56.1 FL (ref 37–54)
EOSINOPHIL # BLD AUTO: 0 10*3/MM3 (ref 0–0.4)
EOSINOPHIL NFR BLD AUTO: 0 % (ref 0.3–6.2)
ERYTHROCYTE [DISTWIDTH] IN BLOOD BY AUTOMATED COUNT: 16.1 % (ref 12.3–15.4)
GFR SERPL CREATININE-BSD FRML MDRD: 60 ML/MIN/1.73
GLUCOSE SERPL-MCNC: 246 MG/DL (ref 65–99)
HCT VFR BLD AUTO: 41.8 % (ref 34–46.6)
HGB BLD-MCNC: 12.7 G/DL (ref 12–15.9)
INR PPP: 2.23 (ref 0.9–1.1)
LYMPHOCYTES # BLD AUTO: 4.96 10*3/MM3 (ref 0.7–3.1)
LYMPHOCYTES NFR BLD AUTO: 54.8 % (ref 19.6–45.3)
MCH RBC QN AUTO: 28.5 PG (ref 26.6–33)
MCHC RBC AUTO-ENTMCNC: 30.4 G/DL (ref 31.5–35.7)
MCV RBC AUTO: 93.7 FL (ref 79–97)
MONOCYTES # BLD AUTO: 0.31 10*3/MM3 (ref 0.1–0.9)
MONOCYTES NFR BLD AUTO: 3.4 % (ref 5–12)
NEUTROPHILS NFR BLD AUTO: 3.75 10*3/MM3 (ref 1.7–7)
NEUTROPHILS NFR BLD AUTO: 41.5 % (ref 42.7–76)
PLATELET # BLD AUTO: 163 10*3/MM3 (ref 140–450)
PMV BLD AUTO: 10.2 FL (ref 6–12)
POTASSIUM SERPL-SCNC: 3.6 MMOL/L (ref 3.5–5.2)
PROTHROMBIN TIME: 24.8 SECONDS (ref 11.7–14.2)
QT INTERVAL: 419 MS
RBC # BLD AUTO: 4.46 10*6/MM3 (ref 3.77–5.28)
SODIUM SERPL-SCNC: 138 MMOL/L (ref 136–145)
TROPONIN T SERPL-MCNC: <0.01 NG/ML (ref 0–0.03)
WBC NRBC COR # BLD: 9.05 10*3/MM3 (ref 3.4–10.8)

## 2022-02-08 PROCEDURE — 93010 ELECTROCARDIOGRAM REPORT: CPT | Performed by: INTERNAL MEDICINE

## 2022-02-08 PROCEDURE — 36415 COLL VENOUS BLD VENIPUNCTURE: CPT

## 2022-02-08 PROCEDURE — 84484 ASSAY OF TROPONIN QUANT: CPT | Performed by: EMERGENCY MEDICINE

## 2022-02-08 PROCEDURE — 80048 BASIC METABOLIC PNL TOTAL CA: CPT | Performed by: EMERGENCY MEDICINE

## 2022-02-08 PROCEDURE — 85610 PROTHROMBIN TIME: CPT | Performed by: EMERGENCY MEDICINE

## 2022-02-08 PROCEDURE — 85025 COMPLETE CBC W/AUTO DIFF WBC: CPT | Performed by: EMERGENCY MEDICINE

## 2022-02-08 PROCEDURE — 99282 EMERGENCY DEPT VISIT SF MDM: CPT

## 2022-02-08 PROCEDURE — 73030 X-RAY EXAM OF SHOULDER: CPT

## 2022-02-08 PROCEDURE — 93005 ELECTROCARDIOGRAM TRACING: CPT | Performed by: EMERGENCY MEDICINE

## 2022-02-08 NOTE — ED PROVIDER NOTES
EMERGENCY DEPARTMENT ENCOUNTER    Room Number:  35/35  Date of encounter:  2/8/2022  PCP: Zenaida Suarez MD  Historian: Pt    Patient was placed in face mask during triage process. Patient was wearing facemask when I entered the room and throughout our encounter. I wore full protective equipment throughout this patient encounter including a face mask, eye protection, and gloves. Hand hygiene was performed before donning protective equipment and again following doffing of PPE after leaving the room.    HPI:  Chief Complaint: Left shoulder pain  A complete HPI/ROS/PMH/PSH/SH/FH are unobtainable due to: N/A   Context: Caitlyn Longoria is a 87 y.o. female who presents to the ED for evaluation after she woke up with left shoulder and upper extremity pain around 3 AM today.  She was able to fall back asleep when she awakened it was still sore and she noted some swelling at the shoulder overlying the deltoid.  She said the pain initially radiated down to her elbow but has now receded primarily to just over the shoulder and is currently 5 out of 10.  It is worse with range of motion of the shoulder but especially abduction.  No known trauma.  No chest pain, change in baseline shortness of breath, abdominal pain, nausea, diaphoresis.  Patient is otherwise been very well.      MEDICAL HISTORY REVIEW      PAST MEDICAL HISTORY  Active Ambulatory Problems     Diagnosis Date Noted   • Neck and shoulder pain 03/24/2017   • Arthropathy of shoulder region 03/24/2017   • Carpal tunnel syndrome of left wrist 03/24/2017   • Chronic pain of both shoulders 06/27/2017   • Chronic left shoulder pain 12/05/2017   • Arthropathy of left shoulder 12/05/2017   • Dysarthria 12/07/2017   • DM II (diabetes mellitus, type II), controlled ([facility]) 12/07/2017   • Paroxysmal atrial fibrillation ([facility]) 12/07/2017   • HLD (hyperlipidemia) 12/07/2017   • Chronic bronchitis ([facility]) 12/07/2017   • Coronary artery disease involving native coronary  artery of native heart with angina pectoris ([facility]) 12/07/2017   • CVA (cerebral vascular accident) ([facility]) 12/10/2017   • HTN (hypertension) 01/14/2018   • CKD (chronic kidney disease), stage III ([facility]) 01/14/2018   • Chronic combined systolic and diastolic congestive heart failure ([facility]) 01/15/2018   • Infection of prosthetic right knee joint ([facility]) 01/17/2018   • Stasis dermatitis of right lower extremity due to peripheral venous hypertension 04/28/2018   • Current use of long term anticoagulation 04/28/2018   • Morbid obesity ([facility]) 02/08/2019   • Acute respiratory failure with hypoxia ([facility]) 09/16/2019   • Rhinovirus 02/11/2020   • Asthma with acute exacerbation 02/11/2020   • Acute respiratory failure with hypoxemia ([facility]) 02/12/2020   • Viral pneumonia 04/23/2020   • Hypoxia 08/29/2020   • CLL (chronic lymphoid leukemia) in relapse ([facility]) 08/31/2020   • Dyspnea 09/29/2020   • Anticoagulated on Coumadin    • Osteoporotic compression fracture of spine ([facility]) 09/30/2020   • Pneumonia due to gram-negative bacteria ([facility]) 10/02/2020   • Pneumonia due to infectious organism 09/29/2020   • Bilateral carotid artery disease ([facility]) 10/28/2020   • Hypogammaglobulinemia ([facility]) 10/28/2020     Resolved Ambulatory Problems     Diagnosis Date Noted   • Hypernatremia 01/15/2018   • Shortness of breath 04/28/2020     Past Medical History:   Diagnosis Date   • Aortic calcification ([facility])    • Aortic regurgitation    • Asthma    • Atrial fibrillation ([facility])    • CAD (coronary artery disease)    • CKD (chronic kidney disease) stage 3, GFR 30-59 ml/min ([facility])    • Disc degeneration, lumbar    • DM type 2 (diabetes mellitus, type 2) ([facility])    • GERD (gastroesophageal reflux disease)    • History of aneurysm    • History of blood transfusion    • History of fracture    • History of vitamin D deficiency    • Hyperlipidemia    • Hypertension    • Mild mitral regurgitation    • Mitral annular calcification    • PAF (paroxysmal atrial fibrillation) ([facility])     • Peripheral neuropathy    • Skin cancer    • Sleep apnea    • SSS (sick sinus syndrome) ([facility])    • Stroke (cerebrum) ([facility])    • Tricuspid regurgitation          PAST SURGICAL HISTORY  Past Surgical History:   Procedure Laterality Date   • BRONCHOSCOPY Bilateral 10/6/2020    Procedure: BRONCHOSCOPY with BILATERAL LUNG washings;  Surgeon: Juno Coburn MD;  Location: [facility] ENDOSCOPY;  Service: Pulmonary;  Laterality: Bilateral;  PRE: purulent bronchitis  POST: PURULENT BRONCHITIS   • BRONCHOSCOPY Bilateral 10/9/2020    Procedure: BRONCHOSCOPY with washing;  Surgeon: Juno Coburn MD;  Location: [facility] ENDOSCOPY;  Service: Pulmonary;  Laterality: Bilateral;  pre/post - mucous plug     • CARDIAC CATHETERIZATION     • CARDIAC ELECTROPHYSIOLOGY PROCEDURE N/A 2/7/2020    Procedure: PPM generator change - dual  medtronic;  Surgeon: Kiel Field MD;  Location: [facility] CATH INVASIVE LOCATION;  Service: Cardiology;  Laterality: N/A;   • CHOLECYSTECTOMY     • CORONARY STENT PLACEMENT     • HERNIA REPAIR      hital hernia   • HYSTERECTOMY     • PACEMAKER IMPLANTATION     • REPLACEMENT TOTAL KNEE Bilateral          FAMILY HISTORY  Family History   Problem Relation Age of Onset   • Heart disease Mother    • Hypertension Mother    • Colon polyps Mother    • Heart disease Father    • Hypertension Father    • Stroke Father    • Hypertension Brother    • Heart disease Brother    • Diabetes Niece    • Colon cancer Sister    • Hypertension Sister    • Hypertension Daughter    • Hypertension Son    • Hypertension Maternal Aunt    • Hypertension Maternal Grandmother    • Hypertension Maternal Grandfather    • Hypertension Paternal Grandmother    • Hypertension Paternal Grandfather          SOCIAL HISTORY  Social History     Socioeconomic History   • Marital status: Single   Tobacco Use   • Smoking status: Never Smoker   • Smokeless tobacco: Never Used   • Tobacco comment: caffeine use- soda   Substance and Sexual Activity    • Alcohol use: Never   • Drug use: No   • Sexual activity: Defer         ALLERGIES  Accupril [quinapril hcl], Ahist [chlorpheniramine], Clarithromycin, Esomeprazole, Levalbuterol, Levocetirizine, Lipitor [atorvastatin], Omeprazole, Pravachol [pravastatin], Sulindac, Valdecoxib, Chlorcyclizine, Diclofenac sodium, Diphenhydramine, Lodine [etodolac], and Sulfa antibiotics        REVIEW OF SYSTEMS  Review of Systems     All systems reviewed and negative except for those discussed in HPI.       PHYSICAL EXAM    I have reviewed the triage vital signs and nursing notes.    ED Triage Vitals [02/08/22 0926]   Temp Heart Rate Resp BP SpO2   97.7 °F (36.5 °C) 87 15 -- 98 %      Temp src Heart Rate Source Patient Position BP Location FiO2 (%)   Tympanic Monitor -- -- --       Physical Exam    Physical Exam   Constitutional: No distress.  Elderly but nontoxic.  HENT:  Head: Normocephalic and atraumatic.   Oropharynx: Mucous membranes are moist.   Eyes: No scleral icterus. No conjunctival pallor.  Neck: Painless range of motion noted. Neck supple.   Cardiovascular: Pink warm well perfused with intact left radial pulse.  Pulmonary/Chest: No respiratory distress.  No tachypnea or increased work of breathing is appreciated.    Musculoskeletal: Moves all extremities equally.  There is discomfort with abduction of the left shoulder with point tenderness over the subacromial bursa on the left.  Shoulder is atraumatic without erythema.  Nontender left humerus and elbow.    Neurological: Alert.  Baseline strength and sensation noted.  Sensation intact throughout the median, radial, ulnar nerve distributions to light touch with normal  strength and finger extension.  Skin: Skin is pink, warm, and dry. No pallor.   Psychiatric: Mood and affect normal.   Nursing note and vitals reviewed.    LAB RESULTS  Recent Results (from the past 24 hour(s))   Basic Metabolic Panel    Collection Time: 02/08/22 10:05 AM    Specimen: Blood   Result  Value Ref Range    Glucose 246 (H) 65 - 99 mg/dL    BUN 22 8 - 23 mg/dL    Creatinine 0.89 0.57 - 1.00 mg/dL    Sodium 138 136 - 145 mmol/L    Potassium 3.6 3.5 - 5.2 mmol/L    Chloride 102 98 - 107 mmol/L    CO2 21.7 (L) 22.0 - 29.0 mmol/L    Calcium 8.6 8.6 - 10.5 mg/dL    eGFR Non African Amer 60 (L) >60 mL/min/1.73    BUN/Creatinine Ratio 24.7 7.0 - 25.0    Anion Gap 14.3 5.0 - 15.0 mmol/L   Troponin    Collection Time: 02/08/22 10:05 AM    Specimen: Blood   Result Value Ref Range    Troponin T <0.010 0.000 - 0.030 ng/mL   Protime-INR    Collection Time: 02/08/22 10:05 AM    Specimen: Blood   Result Value Ref Range    Protime 24.8 (H) 11.7 - 14.2 Seconds    INR 2.23 (H) 0.90 - 1.10   CBC Auto Differential    Collection Time: 02/08/22 10:05 AM    Specimen: Blood   Result Value Ref Range    WBC 9.05 3.40 - 10.80 10*3/mm3    RBC 4.46 3.77 - 5.28 10*6/mm3    Hemoglobin 12.7 12.0 - 15.9 g/dL    Hematocrit 41.8 34.0 - 46.6 %    MCV 93.7 79.0 - 97.0 fL    MCH 28.5 26.6 - 33.0 pg    MCHC 30.4 (L) 31.5 - 35.7 g/dL    RDW 16.1 (H) 12.3 - 15.4 %    RDW-SD 56.1 (H) 37.0 - 54.0 fl    MPV 10.2 6.0 - 12.0 fL    Platelets 163 140 - 450 10*3/mm3    Neutrophil % 41.5 (L) 42.7 - 76.0 %    Lymphocyte % 54.8 (H) 19.6 - 45.3 %    Monocyte % 3.4 (L) 5.0 - 12.0 %    Eosinophil % 0.0 (L) 0.3 - 6.2 %    Basophil % 0.1 0.0 - 1.5 %    Neutrophils, Absolute 3.75 1.70 - 7.00 10*3/mm3    Lymphocytes, Absolute 4.96 (H) 0.70 - 3.10 10*3/mm3    Monocytes, Absolute 0.31 0.10 - 0.90 10*3/mm3    Eosinophils, Absolute 0.00 0.00 - 0.40 10*3/mm3    Basophils, Absolute 0.01 0.00 - 0.20 10*3/mm3   ECG 12 Lead    Collection Time: 02/08/22 10:13 AM   Result Value Ref Range    QT Interval 419 ms       Ordered the above labs and independently reviewed the results.        RADIOLOGY  XR Shoulder 2+ View Left    Result Date: 2/8/2022  TWO VIEWS OF LEFT SHOULDER  HISTORY: 87-year-old female with a history of left AC joint pain.  FINDINGS: AP internal and  external rotation radiographs of the left shoulder were obtained and demonstrate mild AC joint degenerative arthritic change. There is mild elevation of the humeral head. Chondrocalcinosis is noted. Osteopenia is appreciated. Surrounding soft tissue structures appear normal.      There is evidence of CPPD arthropathy of the left shoulder. Mild left AC joint degenerative change is noted.        I ordered the above noted radiological studies. Reviewed by me and discussed with radiologist.  See dictation for official radiology interpretation.      PROCEDURES    Procedures        MEDICATIONS GIVEN IN ER    Medications - No data to display      PROGRESS, DATA ANALYSIS, CONSULTS, AND MEDICAL DECISION MAKING    My differential diagnosis of the upper extremity pain or injury includes but is not limited to contusions of the shoulder, forearm, arm, wrist, elbow or hand, dislocations of shoulder, elbow, wrist, digits, nursemaid's elbow (age-appropriate), shoulder sprain, elbow sprain, wrist sprain, digit sprain, shoulder strain, arm strain, forearm strain, elbow strain, wrist strain, hand sprain, digit strain, lacerations of the upper extremity, fractures both closed and open of clavicle, scapula, humerus, radius, ulna, bones of the wrist, hands and digits, cellulitis or abscess, cervical radiculopathy, radial nerve palsy, neurogenic upper extremity pain, angina equivalent, SVT, DVT, arterial occlusion, compartment syndrome.      All labs have been independently reviewed by me.  All radiology studies have been reviewed by me and discussed with radiologist dictating the report.   EKG's independently viewed and interpreted by me.  Discussion below represents my analysis of pertinent findings related to patient's condition, differential diagnosis, treatment plan and final disposition.      ED Course as of 02/08/22 1138   Tue Feb 08, 2022   1000 Patient remains concerned that the presentation may be anginal equivalent.  I am  agreeable to check an EKG and some basic labs.  Patient agreeable with this plan. [RS]   1022 EKG           EKG time: 1013  Rhythm/Rate: AV paced; 80  P waves and MA: Artificial WESLEY within normal limits  QRS, axis: Wide-complex  ST and T waves: ST/T wave repolarization abnormality    Interpreted Contemporaneously by me, independently viewed  Comparison: Unchanged as compared to 11/9/2020   [RS]   1028 WBC: 9.05 [RS]   1028 Hemoglobin: 12.7 [RS]   1127 Troponin T: <0.010 [RS]   1127 INR(!): 2.23  Therapeutic [RS]   1127 WBC: 9.05 [RS]   1127 Hemoglobin: 12.7 [RS]   1127 Glucose(!): 246 [RS]   1127 BUN: 22 [RS]   1127 Creatinine: 0.89 [RS]   1137 Reviewed findings and possible treatment options.  Patient not a great candidate for injection here in the emergency department but may benefit from such in the orthopedics office.  Has tolerated some NSAIDs in the past.  Would like to try diclofenac gel.  Recommend close outpatient orthopedic follow-up.  Patient agreeable discharge planning. [RS]      ED Course User Index  [RS] Alexander Berry MD       AS OF 11:38 EST VITALS:    BP - 111/66  HR - 80  TEMP - 97.7 °F (36.5 °C) (Tympanic)  O2 SATS - 96%        DIAGNOSIS  Final diagnoses:   Subacromial bursitis of left shoulder joint   Hyperglycemia due to diabetes mellitus (HCC)   Chronic anticoagulation         DISPOSITION  DISCHARGE    Patient discharged in stable condition.    Reviewed implications of results, diagnosis, meds, responsibility to follow up, warning signs and symptoms of possible worsening, potential complications and reasons to return to ER.    Patient/Family voiced understanding of above instructions.    Discussed plan for discharge, as there is no emergent indication for admission. Patient referred to primary care provider for regular health maintenance. Pt/family is agreeable and understands need for follow up and possible repeat testing.  Pt is aware that discharge does not mean that nothing is wrong but it  indicates no emergency is present that requires admission and they must continue care with follow-up as given below or physician of their choice.     FOLLOW-UP  Zenaida Suarez MD  [address]  [facility] [id number] [phone]    Schedule an appointment as soon as possible for a visit   As needed    Alfredo Hunter MD  [address]  [facility] [address]  [facility] [id number] [phone]    Schedule an appointment as soon as possible for a visit in 1 week  If symptoms fail to improve         Medication List      New Prescriptions    Diclofenac Sodium 1 % gel gel  Commonly known as: VOLTAREN  Apply 4 g topically to the appropriate area as directed 2 (Two) Times a Day.           Where to Get Your Medications      You can get these medications from any pharmacy    Bring a paper prescription for each of these medications  · Diclofenac Sodium 1 % gel gel            Alexander Berry MD  02/08/22 3224

## 2022-02-08 NOTE — ED NOTES
Pt presents to ED with complains of L shoulder swelling and pain from shoulder to elbow that started at 0300. Pt denies and falls or injuries at this time. PT is A&OX4, able to ambulate with walker, and in a mask at this time.     Patient was placed in face mask during first look triage.  Patient was wearing a face mask throughout encounter.  I wore personal protective equipment throughout the encounter.  Hand hygiene was performed before and after patient encounter.        Degonda, Janet, RN  02/08/22 0962

## 2022-02-14 ENCOUNTER — ANTICOAGULATION VISIT (OUTPATIENT)
Dept: PHARMACY | Facility: HOSPITAL | Age: 87
End: 2022-02-14

## 2022-02-14 DIAGNOSIS — I48.0 PAROXYSMAL ATRIAL FIBRILLATION: Primary | ICD-10-CM

## 2022-02-14 LAB — INR PPP: 2.1

## 2022-02-14 NOTE — PROGRESS NOTES
Anticoagulation Clinic Progress Note    Anticoagulation Summary  As of 2022    INR goal:  2.0-3.0   TTR:  64.1 % (3.2 y)   INR used for dosin.10 (2022)   Warfarin maintenance plan:  2 mg every Tue; 4 mg all other days   Weekly warfarin total:  26 mg   No change documented:  Petra Oliveira RPH   Plan last modified:  Alexander Valiente, PharmD (2021)   Next INR check:  3/14/2022   Priority:  Maintenance   Target end date:  Indefinite    Indications    Paroxysmal atrial fibrillation (HCC) [I48.0]             Anticoagulation Episode Summary     INR check location:      Preferred lab:      Send INR reminders to:   [facility]    Comments:  [facility] beginning 20      Anticoagulation Care Providers     Provider Role Specialty Phone number    Jonah Regalado Jr., MD Referring Cardiology [phone]          Clinic Interview:  Patient Findings     Positives:  Emergency department visit    Negatives:  Signs/symptoms of thrombosis, Signs/symptoms of bleeding,   Laboratory test error suspected, Change in health, Change in alcohol use,   Change in activity, Upcoming invasive procedure, Upcoming dental   procedure, Missed doses, Extra doses, Change in medications, Change in   diet/appetite, Hospital admission, Bruising, Other complaints    Comments:  In ED on  for joint pain- prescribed diclofenac gel       Clinical Outcomes     Negatives:  Major bleeding event, Thromboembolic event,   Anticoagulation-related hospital admission, Anticoagulation-related ED   visit, Anticoagulation-related fatality    Comments:  In ED on  for joint pain- prescribed diclofenac gel         INR History:  Anticoagulation Monitoring 1/10/2022 2022 2022   INR 2.10 2.20 2.10   INR Date 1/10/2022 2022 2022   INR Goal 2.0-3.0 2.0-3.0 2.0-3.0   Trend Same Same Same   Last Week Total 26 mg 26 mg 26 mg   Next Week Total 26 mg 26 mg 26 mg   Sun 4 mg 4 mg 4 mg   Mon 4 mg 4 mg 4 mg   Tue 2  mg 2 mg 2 mg   Wed 4 mg 4 mg 4 mg   Thu 4 mg 4 mg 4 mg   Fri 4 mg 4 mg 4 mg   Sat 4 mg 4 mg 4 mg   Visit Report - - -   Some recent data might be hidden       Plan:  1. INR is Therapeutic today- see above in Anticoagulation Summary.   Will instruct Caitlyn GARRETT Prietojaydonmaxx to Continue their warfarin regimen- see above in Anticoagulation Summary.  2. Follow up in 4 weeks  3. They have been instructed to call if any changes in medications, doses, concerns, etc. Patient expresses understanding and has no further questions at this time.    Petra Oliveira, [facility]

## 2022-03-01 ENCOUNTER — INFUSION (OUTPATIENT)
Dept: ONCOLOGY | Facility: HOSPITAL | Age: 87
End: 2022-03-01

## 2022-03-01 ENCOUNTER — OFFICE VISIT (OUTPATIENT)
Dept: ONCOLOGY | Facility: CLINIC | Age: 87
End: 2022-03-01

## 2022-03-01 ENCOUNTER — LAB (OUTPATIENT)
Dept: LAB | Facility: HOSPITAL | Age: 87
End: 2022-03-01

## 2022-03-01 VITALS
WEIGHT: 197.7 LBS | BODY MASS INDEX: 35.03 KG/M2 | RESPIRATION RATE: 18 BRPM | SYSTOLIC BLOOD PRESSURE: 109 MMHG | OXYGEN SATURATION: 94 % | HEART RATE: 92 BPM | TEMPERATURE: 97.3 F | HEIGHT: 63 IN | DIASTOLIC BLOOD PRESSURE: 72 MMHG

## 2022-03-01 DIAGNOSIS — C91.12 CLL (CHRONIC LYMPHOID LEUKEMIA) IN RELAPSE: Primary | ICD-10-CM

## 2022-03-01 DIAGNOSIS — D80.1 HYPOGAMMAGLOBULINEMIA: ICD-10-CM

## 2022-03-01 DIAGNOSIS — C91.12 CLL (CHRONIC LYMPHOID LEUKEMIA) IN RELAPSE: ICD-10-CM

## 2022-03-01 DIAGNOSIS — Z79.01 ANTICOAGULATED ON COUMADIN: ICD-10-CM

## 2022-03-01 DIAGNOSIS — I48.0 PAROXYSMAL ATRIAL FIBRILLATION: ICD-10-CM

## 2022-03-01 LAB
BASOPHILS # BLD AUTO: 0.01 10*3/MM3 (ref 0–0.2)
BASOPHILS NFR BLD AUTO: 0.1 % (ref 0–1.5)
DEPRECATED RDW RBC AUTO: 61.6 FL (ref 37–54)
EOSINOPHIL # BLD AUTO: 0 10*3/MM3 (ref 0–0.4)
EOSINOPHIL NFR BLD AUTO: 0 % (ref 0.3–6.2)
ERYTHROCYTE [DISTWIDTH] IN BLOOD BY AUTOMATED COUNT: 18.1 % (ref 12.3–15.4)
HCT VFR BLD AUTO: 38.1 % (ref 34–46.6)
HGB BLD-MCNC: 11.9 G/DL (ref 12–15.9)
IGA1 MFR SER: 16 MG/DL (ref 70–400)
IGG1 SER-MCNC: 971 MG/DL (ref 700–1600)
IGM SERPL-MCNC: 34 MG/DL (ref 40–230)
IMM GRANULOCYTES # BLD AUTO: 0.04 10*3/MM3 (ref 0–0.05)
IMM GRANULOCYTES NFR BLD AUTO: 0.4 % (ref 0–0.5)
LYMPHOCYTES # BLD AUTO: 5.61 10*3/MM3 (ref 0.7–3.1)
LYMPHOCYTES NFR BLD AUTO: 55.8 % (ref 19.6–45.3)
MCH RBC QN AUTO: 29 PG (ref 26.6–33)
MCHC RBC AUTO-ENTMCNC: 31.2 G/DL (ref 31.5–35.7)
MCV RBC AUTO: 92.7 FL (ref 79–97)
MONOCYTES # BLD AUTO: 0.24 10*3/MM3 (ref 0.1–0.9)
MONOCYTES NFR BLD AUTO: 2.4 % (ref 5–12)
NEUTROPHILS NFR BLD AUTO: 4.16 10*3/MM3 (ref 1.7–7)
NEUTROPHILS NFR BLD AUTO: 41.3 % (ref 42.7–76)
NRBC BLD AUTO-RTO: 0 /100 WBC (ref 0–0.2)
PLATELET # BLD AUTO: 170 10*3/MM3 (ref 140–450)
PMV BLD AUTO: 10.2 FL (ref 6–12)
RBC # BLD AUTO: 4.11 10*6/MM3 (ref 3.77–5.28)
WBC NRBC COR # BLD: 10.06 10*3/MM3 (ref 3.4–10.8)

## 2022-03-01 PROCEDURE — 96366 THER/PROPH/DIAG IV INF ADDON: CPT

## 2022-03-01 PROCEDURE — 85025 COMPLETE CBC W/AUTO DIFF WBC: CPT

## 2022-03-01 PROCEDURE — 25010000002 IMMUNE GLOBULIN (HUMAN) 30 GM/300ML SOLUTION: Performed by: INTERNAL MEDICINE

## 2022-03-01 PROCEDURE — 82784 ASSAY IGA/IGD/IGG/IGM EACH: CPT | Performed by: INTERNAL MEDICINE

## 2022-03-01 PROCEDURE — 36415 COLL VENOUS BLD VENIPUNCTURE: CPT

## 2022-03-01 PROCEDURE — 96365 THER/PROPH/DIAG IV INF INIT: CPT

## 2022-03-01 PROCEDURE — 99214 OFFICE O/P EST MOD 30 MIN: CPT | Performed by: INTERNAL MEDICINE

## 2022-03-01 RX ORDER — SODIUM CHLORIDE 9 MG/ML
250 INJECTION, SOLUTION INTRAVENOUS ONCE
Status: CANCELLED | OUTPATIENT
Start: 2022-03-01

## 2022-03-01 RX ORDER — DIPHENHYDRAMINE HYDROCHLORIDE 50 MG/ML
50 INJECTION INTRAMUSCULAR; INTRAVENOUS AS NEEDED
Status: CANCELLED | OUTPATIENT
Start: 2022-03-01

## 2022-03-01 RX ORDER — ACETAMINOPHEN 325 MG/1
650 TABLET ORAL ONCE
Status: CANCELLED | OUTPATIENT
Start: 2022-03-01

## 2022-03-01 RX ORDER — HYDROXYZINE PAMOATE 25 MG/1
25 CAPSULE ORAL ONCE
Status: COMPLETED | OUTPATIENT
Start: 2022-03-01 | End: 2022-03-01

## 2022-03-01 RX ORDER — ACETAMINOPHEN 325 MG/1
650 TABLET ORAL ONCE
Status: COMPLETED | OUTPATIENT
Start: 2022-03-01 | End: 2022-03-01

## 2022-03-01 RX ORDER — HYDROXYZINE PAMOATE 25 MG/1
25 CAPSULE ORAL ONCE
Status: CANCELLED | OUTPATIENT
Start: 2022-03-01

## 2022-03-01 RX ORDER — SODIUM CHLORIDE 9 MG/ML
250 INJECTION, SOLUTION INTRAVENOUS ONCE
Status: COMPLETED | OUTPATIENT
Start: 2022-03-01 | End: 2022-03-01

## 2022-03-01 RX ORDER — MEPERIDINE HYDROCHLORIDE 50 MG/ML
25 INJECTION INTRAMUSCULAR; INTRAVENOUS; SUBCUTANEOUS
Status: CANCELLED | OUTPATIENT
Start: 2022-03-01 | End: 2022-03-02

## 2022-03-01 RX ORDER — FAMOTIDINE 10 MG/ML
20 INJECTION, SOLUTION INTRAVENOUS AS NEEDED
Status: CANCELLED | OUTPATIENT
Start: 2022-03-01

## 2022-03-01 RX ADMIN — SODIUM CHLORIDE 250 ML: 9 INJECTION, SOLUTION INTRAVENOUS at 11:28

## 2022-03-01 RX ADMIN — IMMUNE GLOBULIN INFUSION (HUMAN) 30 G: 100 INJECTION, SOLUTION INTRAVENOUS; SUBCUTANEOUS at 11:48

## 2022-03-01 RX ADMIN — ACETAMINOPHEN 650 MG: 325 TABLET ORAL at 11:28

## 2022-03-01 RX ADMIN — HYDROXYZINE PAMOATE 25 MG: 25 CAPSULE ORAL at 11:29

## 2022-03-01 NOTE — PROGRESS NOTES
Subjective .     REASONS FOR FOLLOW UP:  1. chronic lymphocytic leukemia with recurrent lung infections    Referring MD:    Amarilis Suarez MD    History of Present Illness patient is an.89yo female with  stage 0 CLL which is never required treatments.    Over the last year however she has had multiple hospitalizations for lung infections predominantly viral probably also bacterial and at her last hospitalization in October we rechecked her gammaglobulins which were low and opted to start IV IgG monthly to see if this would keep her out of the hospital.  Since starting IV IgG she has not been hospitalized at all and this is excellent for quality of life.    She has continued on monthly IVIG since October through March for the last 2 years and is tolerating it well     patient  happily reports she has had no infection since her IVIG started.  She reports feeling quite well with walking and doing exercise classes including yoga.  Her body aches are improving as is her energy level.    We are ready to stop IV IgG again for the winter months and she is a little ambivalent about this because she has poor venous access and she will try as long as she can get the IV in.  She is not interested in a port at this time and I do not think she will be able to get subcutaneous IVIG because she is on Coumadin    No fever sweats or weight loss  Past Medical History:   Diagnosis Date   • Aortic calcification ([facility])     mild, on echo 12/17/2017   • Aortic regurgitation     Trace   • Asthma    • Atrial fibrillation ([facility])    • CAD (coronary artery disease)    • Chronic combined systolic and diastolic congestive heart failure ([facility])    • CKD (chronic kidney disease) stage 3, GFR 30-59 ml/min ([facility])    • Coronary artery disease involving native coronary artery of native heart with angina pectoris ([facility])    • Disc degeneration, lumbar    • DM type 2 (diabetes mellitus, type 2) ([facility])    • GERD (gastroesophageal reflux disease)    • History of  aneurysm     right femoral artery s/p LHC   • History of blood transfusion    • History of fracture    • History of vitamin D deficiency    • Hyperlipidemia    • Hypertension    • Mild mitral regurgitation    • Mitral annular calcification     12/8/2017- echo, moderate   • PAF (paroxysmal atrial fibrillation) ([facility])    • Peripheral neuropathy    • Skin cancer     Left hand   • Sleep apnea    • SSS (sick sinus syndrome) ([facility])    • Stroke (cerebrum) ([facility])    • Tricuspid regurgitation     Trace       ONCOLOGIC HISTORY:    EMONC HISTORY  Patient is an 85-year-old female whom I had seen in 2014 when she was hospitalized at [facility] and was diagnosed with chronic lymphocytic leukemia.  She has not been to see me in over 4 years and is following with Dr. Suarez her primary care doctor and her white count is gone up a little higher than usual to 22,000 and she is referred back to us for evaluation.  We are doing a telephone visit because of the coronavirus pandemic and her age and susceptibility.    When I originally saw her in 2014 she was brought into the ER unresponsive in septic shock on 03/25/2014 with methicillin-resistant staphylococcus identified in her blood cultures. The patient has been on antibiotic with improvement, but had some residual kidney damage with a creatinine in the 4-range requiring hemodialysis, and was noted on admission to have an elevated white count with lymphocytosis, normal hemoglobin, but a platelet count of 95,000, and over the course of the illness her platelet count normalized and the white count had gone up into the 20,000 range with lymphocytosis. A flow cytometry was sent which is consistent with CLL, she came to our office once or twice and then stopped coming because she was so stable     She tells me now she was hospitalized recently with rhinovirus infection and has had diagnosed with asthma and is having a lot of respiratory issues but does not require oxygen.She is at the [facility]  home in independent living and managing fairly well    She denies fever sweats or weight loss.  Her last white count was on 5/26/2020  Showed a white count of 18,000 with 66 lymphs and 27 neutrophils platelet white hemoglobin is 12.6 platelet 188,000    I reassured Christopher that no treatment is indicated for this level of leukocytosis from CLL and if she has no systemic complaints I do not feel strongly about doing CAT scans at her age but I would like to physically examine her in the office in a couple of months to make sure there is no obvious adenopathy or hepatosplenomegaly.    She does have recurrent infections and we can check quantitative immunoglobulins to see how low they are as another adjunctive option to prevent recurrent infections if she has hypogammaglobulinemia    She remains on Coumadin for atrial fibrillation      Current Outpatient Medications on File Prior to Visit   Medication Sig Dispense Refill   • acetaminophen (TYLENOL) 325 MG tablet Take 2 tablets by mouth Every 4 (Four) Hours As Needed for Mild Pain .     • acetylcysteine (MUCOMYST) 20 % nebulizer solution Take 2 mL by nebulization 4 (Four) Times a Day.     • albuterol (PROVENTIL) (2.5 MG/3ML) 0.083% nebulizer solution Take 2.5 mg by nebulization Every 4 (Four) Hours.     • albuterol sulfate  (90 Base) MCG/ACT inhaler Inhale 2 puffs Every 4 (Four) Hours As Needed for Wheezing. 1 inhaler 3   • aspirin 81 MG tablet Take 81 mg by mouth Daily.     • Benralizumab (FASENRA SC) Inject  under the skin into the appropriate area as directed. Gets one shot a month, next shot may 13 then one every 8 weeks     • Calcium Carbonate-Vitamin D (calcium-vitamin D) 500-200 MG-UNIT tablet per tablet Take 1 tablet by mouth.     • carvedilol (COREG) 3.125 MG tablet TAKE ONE TABLET BY MOUTH TWICE A  tablet 2   • cephalexin (KEFLEX) 500 MG capsule Take 1 capsule by mouth Every 8 (Eight) Hours for 90 days. 270 capsule 3   • Diclofenac Sodium (VOLTAREN)  1 % gel gel Apply 4 g topically to the appropriate area as directed 2 (Two) Times a Day. 100 g 0   • fluticasone (FLONASE) 50 MCG/ACT nasal spray 1 spray into the nostril(s) as directed by provider Daily.     • Fluticasone-Umeclidin-Vilant (Trelegy Ellipta) 100-62.5-25 MCG/INH aerosol powder  Inhale 1 puff Daily. As directed      • furosemide (LASIX) 20 MG tablet Take 20 mg by mouth Every Morning.     • glipizide (GLUCOTROL) 5 MG tablet Take 5 mg by mouth. Take one half tablet by mouth daily     • glipizide (GLUCOTROL) 5 MG tablet Take 1 tablet by mouth 2 (Two) Times a Day Before Meals.     • guaiFENesin (MUCINEX) 600 MG 12 hr tablet Take 1 tablet by mouth Every 12 (Twelve) Hours.     • Loperamide HCl (IMODIUM PO) Take  by mouth Daily.     • LORazepam (ATIVAN) 0.5 MG tablet      • Melatonin 5 MG capsule Take  by mouth.     • melatonin 5 MG tablet tablet Take 5 mg by mouth Every Night.     • metFORMIN ER (GLUCOPHAGE-XR) 500 MG 24 hr tablet      • montelukast (SINGULAIR) 10 MG tablet Take 10 mg by mouth Every Night.     • mupirocin (BACTROBAN) 2 % ointment      • nystatin (MYCOSTATIN) 662301 UNIT/GM cream      • OXYBUTYNIN CHLORIDE PO Take 20 mg by mouth Daily.     • oxybutynin XL (DITROPAN-XL) 10 MG 24 hr tablet Take 10 mg by mouth every night at bedtime.     • polyethyl glycol-propyl glycol (LUBRICATING EYE DROPS) 0.4-0.3 % solution ophthalmic solution Administer 1 drop to both eyes Every 1 (One) Hour As Needed.     • rosuvastatin (CRESTOR) 20 MG tablet Take 20 mg by mouth Every Night.     • Tobramycin 300 MG/4ML nebulizer solution      • warfarin (COUMADIN) 4 MG tablet TAKE 1/2 TABLET BY MOUTH ON Tuesday AND TAKE ONE TABLET BY MOUTH ALL OTHER DAYS OR AS DIRECTED 85 tablet 1     No current facility-administered medications on file prior to visit.       ALLERGIES:     Allergies   Allergen Reactions   • Accupril [Quinapril Hcl] Swelling, Other (See Comments), GI Intolerance and Delirium     HA, constipation    • Ahist  [Chlorpheniramine] Nausea Only, Other (See Comments) and Dizziness     Headache, Blurred vision   • Clarithromycin Nausea Only, Other (See Comments) and Mental Status Change     HA, Depression, Flushing   • Esomeprazole GI Intolerance   • Levalbuterol Swelling   • Levocetirizine Diarrhea and GI Intolerance   • Lipitor [Atorvastatin] Other (See Comments) and Myalgia     Dark urine   • Omeprazole Nausea Only and Other (See Comments)     HA   • Pravachol [Pravastatin] Nausea Only and GI Intolerance     Bloated, Constipation, HA   • Sulindac Other (See Comments) and Myalgia     HA, joint pain, bruising   • Valdecoxib Irritability   • Chlorcyclizine Unknown - Low Severity   • Diclofenac Sodium Unknown - Low Severity   • Diphenhydramine Unknown - Low Severity   • Lodine [Etodolac] Unknown - Low Severity   • Sulfa Antibiotics Unknown - Low Severity       Social History     Socioeconomic History   • Marital status: Single   Tobacco Use   • Smoking status: Never Smoker   • Smokeless tobacco: Never Used   • Tobacco comment: caffeine use- soda   Substance and Sexual Activity   • Alcohol use: Never   • Drug use: No   • Sexual activity: Defer         Cancer-related family history includes Colon cancer in her sister.     I have reviewed the patient's medical history in detail and updated the computerized patient record.    Review of Systems   Constitutional: Negative for appetite change, chills, diaphoresis, fatigue, fever and unexpected weight change.   HENT: Negative for mouth sores and sore throat.    Eyes: Negative for visual disturbance.   Respiratory: Positive for shortness of breath (stable). Negative for cough and wheezing.    Gastrointestinal: Negative.  Negative for abdominal pain, diarrhea, nausea and vomiting.   Genitourinary: Negative.  Negative for dysuria and frequency.   Musculoskeletal: Positive for back pain.   Neurological: Negative.  Negative for dizziness and weakness.   Hematological: Does not bruise/bleed  easily.   Psychiatric/Behavioral: Negative.  The patient is not nervous/anxious.    All other systems reviewed and are negative.  I have reviewed and confirmed the accuracy of the ROS as documented by the MA/LPN/RN Lucien Larkin MD      Objective      Vitals:    03/01/22 1026   BP: 109/72   Pulse: 92   Resp: 18   Temp: 97.3 °F (36.3 °C)   SpO2: 94%     Current Status 3/1/2022   ECOG score 1     Pain Score    03/01/22 1026   PainSc: 0-No pain       CONSTITUTIONAL:  Vital signs reviewed.  No distress, looks comfortable.  EYES:  Conjunctiva and lids unremarkable.  PERRLA  EARS,NOSE,MOUTH,THROAT: Hearing intact.  Lips, teeth, gums appear unremarkable.  RESPIRATORY:  Normal respiratory effort.  Lungs clear to auscultation on the right decreased breath breath sounds lower half of the left lung  CARDIOVASCULAR:  Normal S1, S2.  No murmurs rubs or gallops.  No significant lower extremity edema.  GASTROINTESTINAL: Abdomen appears unremarkable.  Nontender.  No hepatomegaly.  No splenomegaly.  LYMPHATIC:  No cervical, supraclavicular, axillary lymphadenopathy.  SKIN:  Warm.  No rashes.  Numerous ecchymosis on her arms and legs  PSYCHIATRIC:  Normal judgment and insight.  Normal mood and affect.       I have reexamined the patient 03/01/2022 and the results are consistent with the previously documented exam. Lucien Larkin MD       RECENT LABS:  Results from last 7 days   Lab Units 03/01/22  1001   WBC 10*3/mm3 10.06   NEUTROS ABS 10*3/mm3 4.16   HEMOGLOBIN g/dL 11.9*   HEMATOCRIT % 38.1   PLATELETS 10*3/mm3 170                    Assessment/Plan    1. Chronic lymphocytic leukemia  -stage 0 since 2014 untreated    2.  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  The patient received IVIG treatments October 2020 through March 2021.  We have held IVIG since that time and the patient is happy to report no infections aside from some urinary tract infection  which she has chronically.  Otherwise she has been feeling very well.  · Resume IV IgG from October through March 2022 monthly  · No infections on IV IgG in the winter months    3.  COPD/asthma /emphysema on home O2-recurrent respiratory infections with RSV and rhinovirus and bacteria  · paralysed left hemidiaphragm    4.  Atrial fibrillation on Coumadin    5.  Hypertension/hypercholesterolemia/type 2 diabetes on treatment    6.  Vaccinated against coronavirus    7.  Mild anemia-continued to observe for now    Plan  1.  IVIG today   2 follow up with  Dr. Larkin in Oct to resume ivigg  3. . No treatment indicated for CLL at this time        Lucien Larkin MD

## 2022-03-06 DIAGNOSIS — I48.92 ATRIAL FIBRILLATION AND FLUTTER: ICD-10-CM

## 2022-03-06 DIAGNOSIS — I48.91 ATRIAL FIBRILLATION AND FLUTTER: ICD-10-CM

## 2022-03-08 RX ORDER — CARVEDILOL 3.12 MG/1
TABLET ORAL
Qty: 180 TABLET | Refills: 2 | Status: SHIPPED | OUTPATIENT
Start: 2022-03-08 | End: 2022-12-05

## 2022-03-11 ENCOUNTER — OFFICE VISIT (OUTPATIENT)
Dept: ORTHOPEDIC SURGERY | Facility: CLINIC | Age: 87
End: 2022-03-11

## 2022-03-11 VITALS — WEIGHT: 197 LBS | BODY MASS INDEX: 34.91 KG/M2 | HEIGHT: 63 IN

## 2022-03-11 DIAGNOSIS — M19.019 ARTHRITIS OF SHOULDER: Primary | ICD-10-CM

## 2022-03-11 PROCEDURE — 99214 OFFICE O/P EST MOD 30 MIN: CPT | Performed by: ORTHOPAEDIC SURGERY

## 2022-03-11 PROCEDURE — 20610 DRAIN/INJ JOINT/BURSA W/O US: CPT | Performed by: ORTHOPAEDIC SURGERY

## 2022-03-11 RX ORDER — METHYLPREDNISOLONE ACETATE 80 MG/ML
80 INJECTION, SUSPENSION INTRA-ARTICULAR; INTRALESIONAL; INTRAMUSCULAR; SOFT TISSUE
Status: COMPLETED | OUTPATIENT
Start: 2022-03-11 | End: 2022-03-11

## 2022-03-11 RX ADMIN — METHYLPREDNISOLONE ACETATE 80 MG: 80 INJECTION, SUSPENSION INTRA-ARTICULAR; INTRALESIONAL; INTRAMUSCULAR; SOFT TISSUE at 09:20

## 2022-03-11 NOTE — PROGRESS NOTES
CC:  Left shoulder pain    Ms. Lonogria is seen for her left shoulder.  This is a new patient to me although she has previously seen Dr. Hunter.  She has a long history of shoulder pain dating back at least 15 years.  She has had a couple of injections over the years which have helped in the past.  Current pain is mild, 4 out of 10.  She reports more moderate to severe pain with certain movements.  This intermittent pain is typically stabbing.  She has noticed some swelling and stiffness in the shoulder.  Localizes most of her pain to the front and side of the shoulder.    Left shoulder is examined. Skin is benign.  No obvious gross abnormalities.  No palpable masses or adenopathy.  Moderate tenderness noted over anterior glenohumeral joint and rotator interval.  Motion is limited and uncomfortable--145 degrees FE, 40 degrees ER, back pocket IR.  No instability.  Rotator cuff strength seems to be well preserved but the exam is limited due to patient discomfort with resisted active motion.  Good motor and sensory function in the lower arm and hand.  Brisk capillary refill in hand.  Palpable radial pulse.  Good skin turgor.    Outside AP and rotated AP views of the left shoulder are reviewed to evaluate the patient's complaint.  No comparison films are immediately available.  The x-rays show moderate glenohumeral osteoarthritis with joint space narrowing, osteophyte formation, and subchondral sclerosis.  The acromiohumeral interval measures normal.    Assessment:  Left shoulder osteoarthritis    Plan: We discussed her options.  She has had injections in the past which helped tremendously.  She wanted to get the shoulder injected again today.  The risks, benefits and alternatives were discussed including her elevated risk due to diabetes.  She consented.  Going forward, she will follow-up as needed.    Large Joint Arthrocentesis: L glenohumeral  Date/Time: 3/11/2022 9:20 AM  Consent given by: patient  Site marked: site  marked  Timeout: Immediately prior to procedure a time out was called to verify the correct patient, procedure, equipment, support staff and site/side marked as required   Supporting Documentation  Indications: pain and joint swelling   Procedure Details  Location: shoulder - L glenohumeral  Preparation: Patient was prepped and draped in the usual sterile fashion  Needle gauge: 21g.  Approach: posterior  Medications administered: 80 mg methylPREDNISolone acetate 80 MG/ML; 2 mL lidocaine (cardiac)  Patient tolerance: patient tolerated the procedure well with no immediate complications          Shaun Cheng MD    03/11/2022

## 2022-03-13 PROCEDURE — 93296 REM INTERROG EVL PM/IDS: CPT | Performed by: INTERNAL MEDICINE

## 2022-03-13 PROCEDURE — 93294 REM INTERROG EVL PM/LDLS PM: CPT | Performed by: INTERNAL MEDICINE

## 2022-03-14 ENCOUNTER — ANTICOAGULATION VISIT (OUTPATIENT)
Dept: PHARMACY | Facility: HOSPITAL | Age: 87
End: 2022-03-14

## 2022-03-14 DIAGNOSIS — I48.0 PAROXYSMAL ATRIAL FIBRILLATION: Primary | ICD-10-CM

## 2022-03-14 LAB — INR PPP: 2.5

## 2022-03-14 NOTE — PROGRESS NOTES
Anticoagulation Clinic Progress Note    Anticoagulation Summary  As of 3/14/2022    INR goal:  2.0-3.0   TTR:  64.8 % (3.3 y)   INR used for dosin.50 (3/14/2022)   Warfarin maintenance plan:  2 mg every Tue; 4 mg all other days   Weekly warfarin total:  26 mg   No change documented:  Alexander Valiente, Jessica   Plan last modified:  Alexander Valiente PharmD (2021)   Next INR check:  2022   Priority:  Maintenance   Target end date:  Indefinite    Indications    Paroxysmal atrial fibrillation ([facility]) [I48.0]             Anticoagulation Episode Summary     INR check location:      Preferred lab:      Send INR reminders to:   [facility]    Comments:  [facility] beginning 20      Anticoagulation Care Providers     Provider Role Specialty Phone number    Jonah Regalado Jr., MD Referring Cardiology [phone]          Clinic Interview:  Patient Findings     Negatives:  Signs/symptoms of thrombosis, Signs/symptoms of bleeding,   Laboratory test error suspected, Change in health, Change in alcohol use,   Change in activity, Upcoming invasive procedure, Emergency department   visit, Upcoming dental procedure, Missed doses, Extra doses, Change in   medications, Change in diet/appetite, Hospital admission, Bruising, Other   complaints      Clinical Outcomes     Negatives:  Major bleeding event, Thromboembolic event,   Anticoagulation-related hospital admission, Anticoagulation-related ED   visit, Anticoagulation-related fatality        INR History:  Anticoagulation Monitoring 2022 2022 3/14/2022   INR 2.20 2.10 2.50   INR Date 2022 2022 3/14/2022   INR Goal 2.0-3.0 2.0-3.0 2.0-3.0   Trend Same Same Same   Last Week Total 26 mg 26 mg 26 mg   Next Week Total 26 mg 26 mg 26 mg   Sun 4 mg 4 mg 4 mg   Mon 4 mg 4 mg 4 mg   Tue 2 mg 2 mg 2 mg   Wed 4 mg 4 mg 4 mg   Thu 4 mg 4 mg 4 mg   Fri 4 mg 4 mg 4 mg   Sat 4 mg 4 mg 4 mg   Visit Report - - -   Some recent data might be hidden        Plan:  1. INR is Therapeutic today- see above in Anticoagulation Summary.   Will instruct Caitlyn Longoria to Continue their warfarin regimen- see above in Anticoagulation Summary.  2. Follow up in 4 weeks  3. They have been instructed to call if any changes in medications, doses, concerns, etc. Patient expresses understanding and has no further questions at this time.    Alexander Valiente, PharmD

## 2022-04-08 ENCOUNTER — OFFICE VISIT (OUTPATIENT)
Dept: INFECTIOUS DISEASES | Facility: CLINIC | Age: 87
End: 2022-04-08

## 2022-04-08 VITALS
RESPIRATION RATE: 16 BRPM | HEIGHT: 63 IN | DIASTOLIC BLOOD PRESSURE: 69 MMHG | SYSTOLIC BLOOD PRESSURE: 100 MMHG | TEMPERATURE: 97.5 F | WEIGHT: 196 LBS | HEART RATE: 91 BPM | BODY MASS INDEX: 34.73 KG/M2

## 2022-04-08 DIAGNOSIS — Z96.659 INFECTION OF PROSTHETIC KNEE JOINT, SUBSEQUENT ENCOUNTER: ICD-10-CM

## 2022-04-08 DIAGNOSIS — D80.1 HYPOGAMMAGLOBULINEMIA: ICD-10-CM

## 2022-04-08 DIAGNOSIS — A49.01 MSSA (METHICILLIN SUSCEPTIBLE STAPHYLOCOCCUS AUREUS) INFECTION: Primary | ICD-10-CM

## 2022-04-08 DIAGNOSIS — Z79.2 LONG TERM (CURRENT) USE OF ANTIBIOTICS: ICD-10-CM

## 2022-04-08 DIAGNOSIS — T84.59XD INFECTION OF PROSTHETIC KNEE JOINT, SUBSEQUENT ENCOUNTER: ICD-10-CM

## 2022-04-08 PROCEDURE — 99213 OFFICE O/P EST LOW 20 MIN: CPT | Performed by: INTERNAL MEDICINE

## 2022-04-08 NOTE — PROGRESS NOTES
"CC: f/u Prosthetic right knee infection - culture negative    HPI: Caitlyn Longoria is a 87 y.o. female here for f/u prosthetic right knee infection - culture negative though had a prior history of MSSA. She is on suppressive cephalexin 500 mg PO q8h since she is not a surgical candidate (or at least a poor surgical candidate) per my prior discussions Dr Hunter. She was initially on suppressive cefadroxil 500 mg PO BID but insurance changes caused the price to be too high. No significant knee pain. It occasionally \"pops.\" No heat, erythema, or drainage.     She continues on IVIg for hypogammaglobulinemia. She also follows with oncology for CLL. She is stage 0 and isn't requiring treatment.     She continues to use inhaled tobramycin under the direction of Dr Coburn. She is weaned off of supplemental oxygen during the day. She still wears it at night.     She has been vaccinated and boosted against COVID-19 (booster Sept 2021).      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 Sulfa  2. Clarithromycin - headache    Medications:   Current Outpatient Medications:   •  acetaminophen (TYLENOL) 325 MG tablet, Take 2 tablets by mouth Every 4 (Four) Hours As Needed for Mild Pain ., Disp:  , Rfl:   •  acetylcysteine (MUCOMYST) 20 % nebulizer solution, Take 2 mL by nebulization 4 (Four) Times a Day., Disp:  , Rfl:   •  albuterol (PROVENTIL) (2.5 MG/3ML) 0.083% nebulizer solution, Take 2.5 mg by nebulization Every 4 (Four) Hours., Disp: , Rfl:   •  albuterol sulfate  (90 Base) MCG/ACT inhaler, Inhale 2 puffs Every 4 (Four) Hours As Needed for Wheezing., " Disp: 1 inhaler, Rfl: 3  •  aspirin 81 MG tablet, Take 81 mg by mouth Daily., Disp: , Rfl:   •  Benralizumab (FASENRA SC), Inject  under the skin into the appropriate area as directed. Gets one shot a month, next shot may 13 then one every 8 weeks, Disp: , Rfl:   •  Calcium Carbonate-Vitamin D (calcium-vitamin D) 500-200 MG-UNIT tablet per tablet, Take 1 tablet by mouth., Disp: , Rfl:   •  carvedilol (COREG) 3.125 MG tablet, TAKE ONE TABLET BY MOUTH TWICE A DAY, Disp: 180 tablet, Rfl: 2  •  cephalexin (KEFLEX) 500 MG capsule, Take 1 capsule by mouth Every 8 (Eight) Hours for 90 days., Disp: 270 capsule, Rfl: 3  •  Cetirizine HCl 10 MG capsule, Take  by mouth., Disp: , Rfl:   •  Diclofenac Sodium (VOLTAREN) 1 % gel gel, Apply 4 g topically to the appropriate area as directed 2 (Two) Times a Day., Disp: 100 g, Rfl: 0  •  fluticasone (FLONASE) 50 MCG/ACT nasal spray, 1 spray into the nostril(s) as directed by provider Daily., Disp: , Rfl:   •  Fluticasone-Umeclidin-Vilant (TRELEGY) 100-62.5-25 MCG/INH inhaler, Inhale 1 puff Daily. As directed, Disp: , Rfl:   •  furosemide (LASIX) 20 MG tablet, Take 20 mg by mouth Every Morning., Disp: , Rfl:   •  glipizide (GLUCOTROL) 5 MG tablet, Take 5 mg by mouth. Take one half tablet by mouth daily, Disp: , Rfl:   •  glipizide (GLUCOTROL) 5 MG tablet, Take 1 tablet by mouth 2 (Two) Times a Day Before Meals., Disp: , Rfl:   •  guaiFENesin (MUCINEX) 600 MG 12 hr tablet, Take 1 tablet by mouth Every 12 (Twelve) Hours., Disp:  , Rfl:   •  Loperamide HCl (IMODIUM PO), Take  by mouth Daily., Disp: , Rfl:   •  LORazepam (ATIVAN) 0.5 MG tablet, , Disp: , Rfl:   •  Melatonin 5 MG capsule, Take  by mouth., Disp: , Rfl:   •  melatonin 5 MG tablet tablet, Take 5 mg by mouth Every Night., Disp: , Rfl:   •  metFORMIN ER (GLUCOPHAGE-XR) 500 MG 24 hr tablet, , Disp: , Rfl:   •  montelukast (SINGULAIR) 10 MG tablet, Take 10 mg by mouth Every Night., Disp: , Rfl:   •  mupirocin (BACTROBAN) 2 %  "ointment, , Disp: , Rfl:   •  nystatin (MYCOSTATIN) 339519 UNIT/GM cream, , Disp: , Rfl:   •  OXYBUTYNIN CHLORIDE PO, Take 20 mg by mouth Daily., Disp: , Rfl:   •  oxybutynin XL (DITROPAN-XL) 10 MG 24 hr tablet, Take 10 mg by mouth every night at bedtime., Disp: , Rfl:   •  polyethyl glycol-propyl glycol (SYSTANE) 0.4-0.3 % solution ophthalmic solution (artificial tears), Administer 1 drop to both eyes Every 1 (One) Hour As Needed., Disp: , Rfl:   •  rosuvastatin (CRESTOR) 20 MG tablet, Take 20 mg by mouth Every Night., Disp: , Rfl:   •  Tobramycin 300 MG/4ML nebulizer solution, , Disp: , Rfl:   •  warfarin (COUMADIN) 4 MG tablet, TAKE 1/2 TABLET BY MOUTH ON Tuesday AND TAKE ONE TABLET BY MOUTH ALL OTHER DAYS OR AS DIRECTED, Disp: 85 tablet, Rfl: 1    OBJECTIVE:  /69 (BP Location: Left arm, Patient Position: Sitting, Cuff Size: Large Adult)   Pulse 91   Temp 97.5 °F (36.4 °C) (Temporal)   Resp 16   Ht 160 cm (62.99\")   Wt 88.9 kg (196 lb)   BMI 34.73 kg/m²     General: awake, alert, NAD, very nice  Eyes: no scleral icterus  ENT: wearing mask  Respiratory: normal work of breathing on RA today  Musculoskeletal: R knee incision is healed; knee is neither hot nor erythematous; not tender  Skin: No rashes; bruises on arms  Neurological: Alert and oriented x 3  Psychiatric: Normal mood and affect     DIAGNOSTICS:  CBC, BMP, INR reviewed today  Lab Results   Component Value Date    WBC 10.06 03/01/2022    HGB 11.9 (L) 03/01/2022    HCT 38.1 03/01/2022    MCV 92.7 03/01/2022     03/01/2022     Lab Results   Component Value Date    GLUCOSE 246 (H) 02/08/2022    CALCIUM 8.6 02/08/2022     02/08/2022    K 3.6 02/08/2022    CO2 21.7 (L) 02/08/2022     02/08/2022    BUN 22 02/08/2022    CREATININE 0.89 02/08/2022    EGFRIFNONA 60 (L) 02/08/2022    BCR 24.7 02/08/2022    ANIONGAP 14.3 02/08/2022     Lab Results   Component Value Date    CRP 0.05 08/05/2020     Lab Results   Component Value Date    " SEDRATE 8 08/05/2020     Lab Results   Component Value Date    INR 2.50 03/14/2022    INR 2.10 02/14/2022    INR 2.23 (H) 02/08/2022    PROTIME 24.8 (H) 02/08/2022    PROTIME 32.5 (H) 10/24/2021    PROTIME 20.4 (H) 11/09/2020     ASSESSMENT/PLAN:  1. Prosthetic right knee infection - culture negative (but history of MSSA)  -not a very good surgical candidate per my prior discussions with Dr Hunter  -completed 6 weeks of IV cefazolin in February 2018 and is now on suppressive antibiotic therapy with cephalexin 500 mg PO q8h for chronic suppressive antibiotic therapy  -recent labs reviewed; no need for repeat labs today  -she will call me if any new concerning knee/infection symptoms    2. Long term use of antibiotics  -recent labs reviewed    3. Hypogammaglobulinemia  -on IVIg infusions during the winter months through the [facility]    4. CLL  - no treatment required at this time    5. Afib on Coumadin    6. History of Pseudomonas pneumonia  -on inhaled tobramycin through pulmonary clinic    RTC 12 months or sooner if needed

## 2022-04-13 ENCOUNTER — ANTICOAGULATION VISIT (OUTPATIENT)
Dept: PHARMACY | Facility: HOSPITAL | Age: 87
End: 2022-04-13

## 2022-04-13 DIAGNOSIS — I48.0 PAROXYSMAL ATRIAL FIBRILLATION: Primary | ICD-10-CM

## 2022-04-13 LAB — INR PPP: 2.2

## 2022-04-13 NOTE — PROGRESS NOTES
Anticoagulation Clinic Progress Note    Anticoagulation Summary  As of 2022    INR goal:  2.0-3.0   TTR:  65.7 % (3.3 y)   INR used for dosin.20 (2022)   Warfarin maintenance plan:  2 mg every Tue; 4 mg all other days   Weekly warfarin total:  26 mg   No change documented:  Petra Oliveira RPH   Plan last modified:  Alexander Valiente, PharmD (2021)   Next INR check:  2022   Priority:  Maintenance   Target end date:  Indefinite    Indications    Paroxysmal atrial fibrillation (HCC) [I48.0]             Anticoagulation Episode Summary     INR check location:      Preferred lab:      [facility]    Comments:  [facility] beginning 20      Anticoagulation Care Providers     Provider Role Specialty Phone number    Jonah Regalado Jr., MD Referring Cardiology [phone]          Clinic Interview:  Patient Findings     Negatives:  Signs/symptoms of thrombosis, Signs/symptoms of bleeding,   Laboratory test error suspected, Change in health, Change in alcohol use,   Change in activity, Upcoming invasive procedure, Emergency department   visit, Upcoming dental procedure, Missed doses, Extra doses, Change in   medications, Change in diet/appetite, Hospital admission, Bruising, Other   complaints      Clinical Outcomes     Negatives:  Major bleeding event, Thromboembolic event,   Anticoagulation-related hospital admission, Anticoagulation-related ED   visit, Anticoagulation-related fatality        INR History:  Anticoagulation Monitoring 2022 3/14/2022 2022   INR 2.10 2.50 2.20   INR Date 2022 3/14/2022 2022   INR Goal 2.0-3.0 2.0-3.0 2.0-3.0   Trend Same Same Same   Last Week Total 26 mg 26 mg 26 mg   Next Week Total 26 mg 26 mg 26 mg   Sun 4 mg 4 mg 4 mg   Mon 4 mg 4 mg 4 mg   Tue 2 mg 2 mg 2 mg   Wed 4 mg 4 mg 4 mg   Thu 4 mg 4 mg 4 mg   Fri 4 mg 4 mg 4 mg   Sat 4 mg 4 mg 4 mg   Visit Report - - -   Some recent data might be hidden        Plan:  1. INR is Therapeutic today- see above in Anticoagulation Summary.   Will instruct Caitlyn Longoria to Continue their warfarin regimen- see above in Anticoagulation Summary.  2. Follow up in 4 weeks  3. They have been instructed to call if any changes in medications, doses, concerns, etc. Patient expresses understanding and has no further questions at this time.    Petra Oliveira, [facility]

## 2022-04-19 ENCOUNTER — TELEPHONE (OUTPATIENT)
Dept: ORTHOPEDIC SURGERY | Facility: CLINIC | Age: 87
End: 2022-04-19

## 2022-04-19 NOTE — TELEPHONE ENCOUNTER
Patient called and states in the last few weeks her ]knee has started popping and the ID  wanted her to see if Dr. Hunter needs to see her in office.

## 2022-04-20 DIAGNOSIS — Z96.652 STATUS POST LEFT KNEE REPLACEMENT: Primary | ICD-10-CM

## 2022-04-20 NOTE — TELEPHONE ENCOUNTER
We have ordered a sed rate and CRP lab results that need to be drawn from the hospital.  Please notify the patient to go proceed forward to getting these labs drawn.  Should these results to be elevated then we may need to see her in the office.  We will wait for the results and notify her once we get them.

## 2022-04-21 NOTE — TELEPHONE ENCOUNTER
I have called and left a very detailed message for Mrs. Longoria and asked that she call back to let us know she received the message

## 2022-04-22 ENCOUNTER — LAB (OUTPATIENT)
Dept: LAB | Facility: HOSPITAL | Age: 87
End: 2022-04-22

## 2022-04-22 DIAGNOSIS — Z96.652 STATUS POST LEFT KNEE REPLACEMENT: ICD-10-CM

## 2022-04-22 LAB
CRP SERPL-MCNC: <0.3 MG/DL (ref 0–0.5)
ERYTHROCYTE [SEDIMENTATION RATE] IN BLOOD: 16 MM/HR (ref 0–30)

## 2022-04-22 PROCEDURE — 36415 COLL VENOUS BLD VENIPUNCTURE: CPT

## 2022-04-22 PROCEDURE — 85652 RBC SED RATE AUTOMATED: CPT

## 2022-04-22 PROCEDURE — 86140 C-REACTIVE PROTEIN: CPT

## 2022-04-27 LAB — INR PPP: 3.3

## 2022-04-28 ENCOUNTER — TELEPHONE (OUTPATIENT)
Dept: ORTHOPEDIC SURGERY | Facility: CLINIC | Age: 87
End: 2022-04-28

## 2022-04-28 ENCOUNTER — ANTICOAGULATION VISIT (OUTPATIENT)
Dept: PHARMACY | Facility: HOSPITAL | Age: 87
End: 2022-04-28

## 2022-04-28 DIAGNOSIS — I48.0 PAROXYSMAL ATRIAL FIBRILLATION: Primary | ICD-10-CM

## 2022-04-28 NOTE — TELEPHONE ENCOUNTER
Caller:DELFIN RICH      Relationship: SELF    Best call back number:     Caller requesting test results: SAME    What test was performed: BLOOD TEST     Additional notes: PATIENT IS CALLING TO GO OVER BLOOD TEST RESULTS

## 2022-04-28 NOTE — PROGRESS NOTES
Anticoagulation Clinic Progress Note    Anticoagulation Summary  As of 4/28/2022    INR goal:  2.0-3.0   TTR:  65.7 % (3.4 y)   INR used for dosing:  3.3 (4/27/2022)   Warfarin maintenance plan:  2 mg every Tue; 4 mg all other days   Weekly warfarin total:  26 mg   Plan last modified:  Alexander Valiente, PharmD (11/24/2021)   Next INR check:  5/11/2022   Priority:  Maintenance   Target end date:  Indefinite    Indications    Paroxysmal atrial fibrillation (HCC) [I48.0]             Anticoagulation Episode Summary     INR check location:      Preferred lab:      Send INR reminders to:   [facility]    Comments:  [facility] beginning 4/22/20      Anticoagulation Care Providers     Provider Role Specialty Phone number    Jonah Regalado Jr., MD Referring Cardiology [phone]          Clinic Interview:  Patient Findings     Negatives:  Signs/symptoms of thrombosis, Signs/symptoms of bleeding,   Laboratory test error suspected, Change in health, Change in alcohol use,   Change in activity, Upcoming invasive procedure, Emergency department   visit, Upcoming dental procedure, Missed doses, Extra doses, Change in   medications, Change in diet/appetite, Hospital admission, Bruising, Other   complaints      Clinical Outcomes     Negatives:  Major bleeding event, Thromboembolic event,   Anticoagulation-related hospital admission, Anticoagulation-related ED   visit, Anticoagulation-related fatality        INR History:  Anticoagulation Monitoring 3/14/2022 4/13/2022 4/28/2022   INR 2.50 2.20 3.3   INR Date 3/14/2022 4/13/2022 4/27/2022   INR Goal 2.0-3.0 2.0-3.0 2.0-3.0   Trend Same Same Same   Last Week Total 26 mg 26 mg 26 mg   Next Week Total 26 mg 26 mg 24 mg   Sun 4 mg 4 mg 4 mg   Mon 4 mg 4 mg 4 mg   Tue 2 mg 2 mg 2 mg   Wed 4 mg 4 mg 4 mg   Thu 4 mg 4 mg 2 mg (4/28); Otherwise 4 mg   Fri 4 mg 4 mg 4 mg   Sat 4 mg 4 mg 4 mg   Visit Report - - -   Some recent data might be hidden       Plan:  1. INR is  Supratherapeutic today- see above in Anticoagulation Summary.   Will instruct Caitlyn GARRETT Longoria to Change their warfarin regimen- see above in Anticoagulation Summary.  2. Follow up in 2 weeks  3.  They have been instructed to call if any changes in medications, doses, concerns, etc. Patient expresses understanding and has no further questions at this time.    Petra Oliveira, [facility]

## 2022-04-29 ENCOUNTER — TELEPHONE (OUTPATIENT)
Dept: CARDIOLOGY | Facility: CLINIC | Age: 87
End: 2022-04-29

## 2022-04-29 NOTE — TELEPHONE ENCOUNTER
Spoke with patient.  She confessed that she never really stopped her furosemide.  She states when she pressed on her shin or ankle she cannot leave an indent.  Her blood pressure has been anywhere from  systolic.  She states her pacemaker site seems to appear swollen for 5 minutes and then it goes away spontaneously.  That has been intermittent over the past week.  Discussed hematoma and supratherapeutic INR.  Her warfarin dose was adjusted and she has repeat INR next week.  I offered to see her on Monday or Tuesday however she needs to arrange for a ride.  At this time I have advised that she continue furosemide and low-dose carvedilol.  She is to keep a list of her blood pressure readings at home and let me know when she has a ride to come in and I will work her in next week.  She verbalized understanding

## 2022-04-29 NOTE — TELEPHONE ENCOUNTER
Patient called to report she has lost 30 lbs and her BP has been low.  She feels dizzy and tired.   She has had edema of her legs x 3-4 weeks.   She states she is bruising very easlly and has a hematoma on one of her legs. She also feels like there is swelling around her pacemaker cite intermittently.  She did see Dr. Suarez and she wanted patient to stop Furosemide 10 mg daily.  Patient would like to discuss this with you.  Please call her at [phone]./ KOBI

## 2022-04-29 NOTE — TELEPHONE ENCOUNTER
Please let her know that her labs are normal would suggest very low likelihood of any underlying infection.  Needs to follow-up with me in 1 year unless she has any change in symptoms

## 2022-05-11 ENCOUNTER — ANTICOAGULATION VISIT (OUTPATIENT)
Dept: PHARMACY | Facility: HOSPITAL | Age: 87
End: 2022-05-11

## 2022-05-11 DIAGNOSIS — I48.0 PAROXYSMAL ATRIAL FIBRILLATION: Primary | ICD-10-CM

## 2022-05-11 LAB — INR PPP: 1.9

## 2022-05-11 NOTE — PROGRESS NOTES
Anticoagulation Clinic Progress Note    Anticoagulation Summary  As of 2022    INR goal:  2.0-3.0   TTR:  65.7 % (3.4 y)   INR used for dosin.90 (2022)   Warfarin maintenance plan:  2 mg every Tue; 4 mg all other days   Weekly warfarin total:  26 mg   No change documented:  Alexander Valiente, Jessica   Plan last modified:  Alexander Valiente PharmD (2021)   Next INR check:  2022   Priority:  Maintenance   Target end date:  Indefinite    Indications    Paroxysmal atrial fibrillation (HCC) [I48.0]             Anticoagulation Episode Summary     INR check location:      Preferred lab:      [facility]ers [facility]:   [facility]    [facility]      Anticoagulation Care Providers     Provider Role Specialty Phone number    Jonah Regalado Jr., MD Referring Cardiology [phone]          Clinic Interview:  Patient Findings     Negatives:  Signs/symptoms of thrombosis, Signs/symptoms of bleeding,   Laboratory test error suspected, Change in health, Change in alcohol use,   Change in activity, Upcoming invasive procedure, Emergency department   visit, Upcoming dental procedure, Missed doses, Extra doses, Change in   medications, Change in diet/appetite, Hospital admission, Bruising, Other   complaints      Clinical Outcomes     Negatives:  Major bleeding event, Thromboembolic event,   Anticoagulation-related hospital admission, Anticoagulation-related ED   visit, Anticoagulation-related fatality        INR History:  Anticoagulation Monitoring 2022   INR 2.20 3.3 1.90   INR Date 2022   INR Goal 2.0-3.0 2.0-3.0 2.0-3.0   Trend Same Same Same   Last Week Total 26 mg 26 mg 26 mg   Next Week Total 26 mg 24 mg 26 mg   Sun 4 mg 4 mg 4 mg   Mon 4 mg 4 mg 4 mg   Tue 2 mg 2 mg 2 mg   Wed 4 mg 4 mg 4 mg   Thu 4 mg 2 mg (); Otherwise 4 mg 4 mg   Fri 4 mg 4 mg 4 mg   Sat 4 mg 4 mg 4 mg   Visit Report - - -   Some recent  data might be hidden       Plan:  1. INR is Subtherapeutic today- see above in Anticoagulation Summary.   Will instruct Caitlyn Longoria to Continue their warfarin regimen- see above in Anticoagulation Summary.  2. Follow up in 2 weeks  3. They have been instructed to call if any changes in medications, doses, concerns, etc. Patient expresses understanding and has no further questions at this time.    Alexander Valiente, PharmD

## 2022-05-27 ENCOUNTER — ANTICOAGULATION VISIT (OUTPATIENT)
Dept: PHARMACY | Facility: HOSPITAL | Age: 87
End: 2022-05-27

## 2022-05-27 DIAGNOSIS — I48.0 PAROXYSMAL ATRIAL FIBRILLATION: Primary | ICD-10-CM

## 2022-05-27 LAB — INR PPP: 2.9

## 2022-05-27 NOTE — PROGRESS NOTES
Anticoagulation Clinic Progress Note    Anticoagulation Summary  As of 2022    INR goal:  2.0-3.0   TTR:  66.0 % (3.5 y)   INR used for dosin.90 (2022)   Warfarin maintenance plan:  2 mg every Tue; 4 mg all other days   Weekly warfarin total:  26 mg   Plan last modified:  Alexander Valiente, PharmD (2021)   Next INR check:  6/3/2022   Priority:  Maintenance   Target end date:  Indefinite    Indications    Paroxysmal atrial fibrillation (HCC) [I48.0]             Anticoagulation Episode Summary     INR check location:      Preferred lab:      Send INR reminders to:   [facility] [place]    Comments[facility]  [facility]      Anticoagulation Care Providers     Provider Role Specialty Phone number    Jonah Regalado Jr., MD Referring Cardiology [phone]          Clinic Interview:  Patient Findings     Positives:  Change in health, Change in medications    Negatives:  Signs/symptoms of thrombosis, Signs/symptoms of bleeding,   Laboratory test error suspected, Change in alcohol use, Change in   activity, Upcoming invasive procedure, Emergency department visit,   Upcoming dental procedure, Missed doses, Extra doses, Change in   diet/appetite, Hospital admission, Bruising, Other complaints    Comments:  Taking prednisone and zithromax since Tuesday, has pneumonia       Clinical Outcomes     Negatives:  Major bleeding event, Thromboembolic event,   Anticoagulation-related hospital admission, Anticoagulation-related ED   visit, Anticoagulation-related fatality    Comments:  Taking prednisone and zithromax since Tuesday, has pneumonia         INR History:  Anticoagulation Monitoring 2022   INR 3.3 1.90 2.90   INR Date 2022   INR Goal 2.0-3.0 2.0-3.0 2.0-3.0   Trend Same Same Same   Last Week Total 26 mg 26 mg 26 mg   Next Week Total 24 mg 26 mg 24 mg   Sun 4 mg 4 mg 4 mg   Mon 4 mg 4 mg 4 mg   Tue 2 mg 2 mg 2 mg   Wed 4 mg 4 mg 4 mg    Thu 2 mg (4/28); Otherwise 4 mg 4 mg 4 mg   Fri 4 mg 4 mg 2 mg (5/27)   Sat 4 mg 4 mg 4 mg   Visit Report - - -   Some recent data might be hidden       Plan:  1. INR is Therapeutic today- see above in Anticoagulation Summary.   Will instruct Caitlyn Longoria to Change their warfarin regimen- see above in Anticoagulation Summary.  Take 2mg today,5/27 then resume previous regimen.   2. Follow up in 1 weeks  3. They have been instructed to call if any changes in medications, doses, concerns, etc. Patient expresses understanding and has no further questions at this time.    Jaylin Nick, PharmD

## 2022-06-03 ENCOUNTER — ANTICOAGULATION VISIT (OUTPATIENT)
Dept: PHARMACY | Facility: HOSPITAL | Age: 87
End: 2022-06-03

## 2022-06-03 DIAGNOSIS — I48.0 PAROXYSMAL ATRIAL FIBRILLATION: Primary | ICD-10-CM

## 2022-06-03 LAB — INR PPP: 3.2

## 2022-06-03 NOTE — PROGRESS NOTES
Anticoagulation Clinic Progress Note    Anticoagulation Summary  As of 6/3/2022    INR goal:  2.0-3.0   TTR:  65.8 % (3.5 y)   INR used for dosing:  3.20 (6/3/2022)   Warfarin maintenance plan:  2 mg every Tue; 4 mg all other days   Weekly warfarin total:  26 mg   Plan last modified:  Alexander Valiente, PharmD (11/24/2021)   Next INR check:  6/10/2022   Priority:  Maintenance   Target end date:  Indefinite    Indications    Paroxysmal atrial fibrillation (HCC) [I48.0]             Anticoagulation Episode Summary     INR check location:      Preferred lab:      Send INR reminders to:   [facility]    [facility]      Anticoagulation Care Providers     Provider Role Specialty Phone number    Jonah Regalado Jr., MD Referring Cardiology [phone]          Clinic Interview:  Patient Findings     Negatives:  Signs/symptoms of thrombosis, Signs/symptoms of bleeding,   Laboratory test error suspected, Change in health, Change in alcohol use,   Change in activity, Upcoming invasive procedure, Emergency department   visit, Upcoming dental procedure, Missed doses, Extra doses, Change in   medications, Change in diet/appetite, Hospital admission, Bruising, Other   complaints      Clinical Outcomes     Negatives:  Major bleeding event, Thromboembolic event,   Anticoagulation-related hospital admission, Anticoagulation-related ED   visit, Anticoagulation-related fatality        INR History:  Anticoagulation Monitoring 5/11/2022 5/27/2022 6/3/2022   INR 1.90 2.90 3.20   INR Date 5/11/2022 5/27/2022 6/3/2022   INR Goal 2.0-3.0 2.0-3.0 2.0-3.0   Trend Same Same Same   Last Week Total 26 mg 26 mg 24 mg   Next Week Total 26 mg 24 mg 24 mg   Sun 4 mg 4 mg 4 mg   Mon 4 mg 4 mg 4 mg   Tue 2 mg 2 mg 2 mg   Wed 4 mg 4 mg 4 mg   Thu 4 mg 4 mg 4 mg   Fri 4 mg 2 mg (5/27) 2 mg (6/3)   Sat 4 mg 4 mg 4 mg   Visit Report - - -   Some recent data might be hidden       Plan:  1. INR is  Supratherapeutic today- see above in Anticoagulation Summary.   Will instruct Caitlyn Longoria to Change their warfarin regimen- see above in Anticoagulation Summary. Take 2mg dose today instead of usual 4mg dose. Then resume previous dosing.  2. Follow up in 1 weeks  3.They have been instructed to call if any changes in medications, doses, concerns, etc. Patient expresses understanding and has no further questions at this time.    Jaylin Nick, PharmD

## 2022-06-10 ENCOUNTER — ANTICOAGULATION VISIT (OUTPATIENT)
Dept: PHARMACY | Facility: HOSPITAL | Age: 87
End: 2022-06-10

## 2022-06-10 DIAGNOSIS — I48.0 PAROXYSMAL ATRIAL FIBRILLATION: Primary | ICD-10-CM

## 2022-06-10 LAB — INR PPP: 3.5

## 2022-06-10 NOTE — PROGRESS NOTES
Anticoagulation Clinic Progress Note    Anticoagulation Summary  As of 6/10/2022    INR goal:  2.0-3.0   TTR:  65.4 % (3.5 y)   INR used for dosing:  3.50 (6/10/2022)   Warfarin maintenance plan:  2 mg every Tue; 4 mg all other days   Weekly warfarin total:  26 mg   Plan last modified:  Alexander Valiente, PharmD (11/24/2021)   Next INR check:  6/17/2022   Priority:  Maintenance   Target end date:  Indefinite    Indications    Paroxysmal atrial fibrillation (HCC) [I48.0]             Anticoagulation Episode Summary     INR check location:      Preferred lab:      Send INR reminders to:   [facility]    Comments:  [facility] beginning 4/22/20      Anticoagulation Care Providers     Provider Role Specialty Phone number    Jonah Regalado Jr., MD Referring Cardiology [phone]          Clinic Interview:  Patient Findings     Positives:  Change in medications    Comments:  Finished steroids/ z nia on Sunday       Clinical Outcomes     Comments:  Finished steroids/ z nia on Sunday         INR History:  Anticoagulation Monitoring 5/27/2022 6/3/2022 6/10/2022   INR 2.90 3.20 3.50   INR Date 5/27/2022 6/3/2022 6/10/2022   INR Goal 2.0-3.0 2.0-3.0 2.0-3.0   Trend Same Same Same   Last Week Total 26 mg 24 mg 24 mg   Next Week Total 24 mg 24 mg 22 mg   Sun 4 mg 4 mg 2 mg (6/12)   Mon 4 mg 4 mg 4 mg   Tue 2 mg 2 mg 2 mg   Wed 4 mg 4 mg 4 mg   Thu 4 mg 4 mg 4 mg   Fri 2 mg (5/27) 2 mg (6/3) 2 mg (6/10)   Sat 4 mg 4 mg 4 mg   Visit Report - - -   Some recent data might be hidden       Plan:  1. INR is Supratherapeutic today- see above in Anticoagulation Summary.   Will instruct Caitlyn Longoria to Change their warfarin regimen- see above in Anticoagulation Summary.  2. Follow up in 1 week  3. They have been instructed to call if any changes in medications, doses, concerns, etc. Patient expresses understanding and has no further questions at this time.    Petra Oliveira [facility]

## 2022-06-20 ENCOUNTER — ANTICOAGULATION VISIT (OUTPATIENT)
Dept: PHARMACY | Facility: HOSPITAL | Age: 87
End: 2022-06-20

## 2022-06-20 DIAGNOSIS — I48.0 PAROXYSMAL ATRIAL FIBRILLATION: Primary | ICD-10-CM

## 2022-06-20 LAB — INR PPP: 2

## 2022-06-20 NOTE — PROGRESS NOTES
Anticoagulation Clinic Progress Note    Anticoagulation Summary  As of 2022    INR goal:  2.0-3.0   TTR:  65.5 % (3.5 y)   INR used for dosin.00 (2022)   Warfarin maintenance plan:  2 mg every Tue, Fri; 4 mg all other days   Weekly warfarin total:  24 mg   Plan last modified:  Petra Oliveira RPH (2022)   Next INR check:  2022   Priority:  Maintenance   Target end date:  Indefinite    Indications    Paroxysmal atrial fibrillation (HCC) [I48.0]             Anticoagulation Episode Summary     INR check location:      Preferred lab:      Send INR reminders to:   [facility]    Comments:  [facility] beginning 20      Anticoagulation Care Providers     Provider Role Specialty Phone number    Jonah Regalado Jr., MD Referring Cardiology [phone]          Clinic Interview:  Patient Findings     Negatives:  Signs/symptoms of thrombosis, Signs/symptoms of bleeding,   Laboratory test error suspected, Change in health, Change in alcohol use,   Change in activity, Upcoming invasive procedure, Emergency department   visit, Upcoming dental procedure, Missed doses, Extra doses, Change in   medications, Change in diet/appetite, Hospital admission, Bruising, Other   complaints      Clinical Outcomes     Negatives:  Major bleeding event, Thromboembolic event,   Anticoagulation-related hospital admission, Anticoagulation-related ED   visit, Anticoagulation-related fatality        INR History:  Anticoagulation Monitoring 6/3/2022 6/10/2022 2022   INR 3.20 3.50 2.00   INR Date 6/3/2022 6/10/2022 2022   INR Goal 2.0-3.0 2.0-3.0 2.0-3.0   Trend Same Same Down   Last Week Total 24 mg 24 mg 22 mg   Next Week Total 24 mg 22 mg 24 mg   Sun 4 mg 2 mg () 4 mg   Mon 4 mg 4 mg 4 mg   Tue 2 mg 2 mg 2 mg   Wed 4 mg 4 mg 4 mg   Thu 4 mg 4 mg 4 mg   Fri 2 mg (6/3) 2 mg (6/10) 2 mg   Sat 4 mg 4 mg 4 mg   Visit Report - - -   Some recent data might be hidden       Plan:  1. INR is  Therapeutic today- see above in Anticoagulation Summary.   Will instruct Caitlyn Longoria to Change their warfarin regimen per patients request- see above in Anticoagulation Summary.  2. Follow up in 2 weeks  3.  They have been instructed to call if any changes in medications, doses, concerns, etc. Patient expresses understanding and has no further questions at this time.    Petra Oliveira, [facility]

## 2022-06-28 RX ORDER — WARFARIN SODIUM 4 MG/1
TABLET ORAL
Qty: 85 TABLET | Refills: 1 | Status: SHIPPED | OUTPATIENT
Start: 2022-06-28 | End: 2023-01-05 | Stop reason: SDUPTHER

## 2022-07-06 ENCOUNTER — ANTICOAGULATION VISIT (OUTPATIENT)
Dept: PHARMACY | Facility: HOSPITAL | Age: 87
End: 2022-07-06

## 2022-07-06 DIAGNOSIS — I48.0 PAROXYSMAL ATRIAL FIBRILLATION: Primary | ICD-10-CM

## 2022-07-06 LAB — INR PPP: 2.3

## 2022-07-06 NOTE — PROGRESS NOTES
Anticoagulation Clinic Progress Note    Anticoagulation Summary  As of 2022    INR goal:  2.0-3.0   TTR:  65.9 % (3.6 y)   INR used for dosin.30 (2022)   Warfarin maintenance plan:  2 mg every Tue, Fri; 4 mg all other days   Weekly warfarin total:  24 mg   No change documented:  Jaylin Nick, Jessica   Plan last modified:  Petra Oliveira RPH (2022)   Next INR check:  2022   Priority:  Maintenance   Target end date:  Indefinite    Indications    Paroxysmal atrial fibrillation (HCC) [I48.0]             Anticoagulation Episode Summary     INR check location:      Preferred lab:      [facility]:   [facility]    Comments:  [facility] lab beginning 20      Anticoagulation Care Providers     Provider Role Specialty Phone number    Jonah Regalado Jr., MD Referring Cardiology [phone]          Clinic Interview:  Patient Findings     Negatives:  Signs/symptoms of thrombosis, Signs/symptoms of bleeding,   Laboratory test error suspected, Change in health, Change in alcohol use,   Change in activity, Upcoming invasive procedure, Emergency department   visit, Upcoming dental procedure, Missed doses, Extra doses, Change in   medications, Change in diet/appetite, Hospital admission, Bruising, Other   complaints      Clinical Outcomes     Negatives:  Major bleeding event, Thromboembolic event,   Anticoagulation-related hospital admission, Anticoagulation-related ED   visit, Anticoagulation-related fatality        INR History:  Anticoagulation Monitoring 6/10/2022 2022 2022   INR 3.50 2.00 2.30   INR Date 6/10/2022 2022 2022   INR Goal 2.0-3.0 2.0-3.0 2.0-3.0   Trend Same Down Same   Last Week Total 24 mg 22 mg 24 mg   Next Week Total 22 mg 24 mg 24 mg   Sun 2 mg () 4 mg 4 mg   Mon 4 mg 4 mg 4 mg   Tue 2 mg 2 mg 2 mg   Wed 4 mg 4 mg 4 mg   Thu 4 mg 4 mg 4 mg   Fri 2 mg (6/10) 2 mg 2 mg   Sat 4 mg 4 mg 4 mg   Visit Report - - -   Some recent data  might be hidden       Plan:  1. INR is Therapeutic today- see above in Anticoagulation Summary.   Will instruct Caitlyn Longoria to Continue their warfarin regimen- see above in Anticoagulation Summary.  2. Follow up in 2 weeks  3. They have been instructed to call if any changes in medications, doses, concerns, etc. Patient expresses understanding and has no further questions at this time.    Jaylin Nick, PharmD

## 2022-07-19 ENCOUNTER — OFFICE VISIT (OUTPATIENT)
Dept: CARDIOLOGY | Facility: CLINIC | Age: 87
End: 2022-07-19

## 2022-07-19 VITALS
HEART RATE: 85 BPM | DIASTOLIC BLOOD PRESSURE: 74 MMHG | WEIGHT: 198 LBS | SYSTOLIC BLOOD PRESSURE: 130 MMHG | BODY MASS INDEX: 35.08 KG/M2 | OXYGEN SATURATION: 95 % | HEIGHT: 63 IN

## 2022-07-19 DIAGNOSIS — I25.10 CORONARY ARTERY DISEASE INVOLVING NATIVE CORONARY ARTERY OF NATIVE HEART WITHOUT ANGINA PECTORIS: Primary | ICD-10-CM

## 2022-07-19 DIAGNOSIS — I48.92 ATRIAL FIBRILLATION AND FLUTTER: ICD-10-CM

## 2022-07-19 DIAGNOSIS — I50.22 CHRONIC SYSTOLIC CHF (CONGESTIVE HEART FAILURE), NYHA CLASS 3: ICD-10-CM

## 2022-07-19 DIAGNOSIS — I48.91 ATRIAL FIBRILLATION AND FLUTTER: ICD-10-CM

## 2022-07-19 DIAGNOSIS — I49.5 SICK SINUS SYNDROME: ICD-10-CM

## 2022-07-19 PROCEDURE — 99214 OFFICE O/P EST MOD 30 MIN: CPT | Performed by: INTERNAL MEDICINE

## 2022-07-19 RX ORDER — FUROSEMIDE 20 MG/1
20 TABLET ORAL AS NEEDED
Qty: 30 TABLET | Refills: 11
Start: 2022-07-19

## 2022-07-19 NOTE — PROGRESS NOTES
[facility]      Patient Name: Caitlyn Longoria  :1934  Age: 87 y.o.  Encounter Provider:  Jonah Regalado Jr, MD      Chief Complaint: Follow-up coronary artery disease and atrial fibrillation      HPI  Caitlyn Longoria is a 87 y.o. female with past medical history of hypertension, dyslipidemia, paroxysmal atrial fibrillation, coronary artery disease, CVA, chronic systolic and diastolic heart failure, ischemic cardiomyopathy, CKD and diabetes who presents for routine follow-up of chronic medical issues.  She was previously followed by Dr. Veliz and presents to me for the first time today.    Cardiac history includes anterior myocardial infarction.  In  cardiac catheterization performed showing total occlusion of the mid LAD, severe disease of the large diagonal and a significant disease of the RCA and left circumflex.  Angioplasty and drug-eluting stent placed in the diagonal.  EF at that time was 35%.  Complication of the procedure was right femoral pseudoaneurysm which was surgically repaired.  Follow-up echocardiogram in  showed recovery of LV function.    In  she developed severe symptomatic bradycardia and a dual-chamber pacemaker was placed.  She underwent atrial flutter ablation in the same year.    Due to recurrent symptoms she had another cardiac catheterization in  she was found to have in-stent restenosis of the diagonal vessel with  of the Saunders mid LAD.  She had repeat PCI to the diagonal vessel.  Right heart catheterization showed PA systolic pressure of 53 mmHg.  She presented with heart failure in  when echocardiogram showed EF of 23% with grade 1 diastolic dysfunction calcific valvular disease with no significant stenosis or regurgitation.  After being placed back on GDMT improved EF found on echocardiogram in  and 35 to 40%.  Since then she has developed worsening reactive airway disease and is currently on 2 to 3 L of home  "oxygen.    She presents today for routine follow-up.  She is living in an independent living facility and is doing very well.  She is weaned off of oxygen.  She was having some dizziness associated with lower blood pressure and was taken off of losartan which I think was appropriate she brings in a blood pressure log that shows low normal blood pressure.  Dizziness is resolved at this time.  Social and family history was reviewed and is not pertinent to this clinic visit.          The following portions of the patient's history were reviewed and updated as appropriate: allergies, current medications, past family history, past medical history, past social history, past surgical history and problem list.      Review of Systems   Constitutional: Negative for chills and fever.   HENT: Negative for hoarse voice and sore throat.    Eyes: Negative for double vision and photophobia.   Cardiovascular: Positive for dyspnea on exertion. Negative for chest pain, leg swelling, near-syncope, orthopnea, palpitations, paroxysmal nocturnal dyspnea and syncope.   Respiratory: Positive for shortness of breath. Negative for cough and wheezing.    Skin: Negative for poor wound healing and rash.   Musculoskeletal: Negative for arthritis and joint swelling.   Gastrointestinal: Negative for bloating, abdominal pain, hematemesis and hematochezia.   Neurological: Negative for dizziness and focal weakness.   Psychiatric/Behavioral: Negative for depression and suicidal ideas.       OBJECTIVE:   Vital Signs  Vitals:    07/19/22 1132   BP: 130/74   Pulse: 85   SpO2: 95%     Estimated body mass index is 35.07 kg/m² as calculated from the following:    Height as of this encounter: 160 cm (63\").    Weight as of this encounter: 89.8 kg (198 lb).    Vitals reviewed.   Constitutional:       Appearance: Not in distress. Chronically ill-appearing.   Neck:      Vascular: No JVR. JVD normal.   Pulmonary:      Effort: Pulmonary effort is normal.      " Breath sounds: No wheezing. No rhonchi. No rales.   Chest:      Chest wall: Not tender to palpatation.   Cardiovascular:      PMI at left midclavicular line. Normal rate. Regular rhythm. Normal S1. Normal S2.      Murmurs: There is a systolic murmur.      No gallop. No click. No rub.   Pulses:     Intact distal pulses.   Edema:     Peripheral edema present.  Abdominal:      General: Bowel sounds are normal.      Palpations: Abdomen is soft.      Tenderness: There is no abdominal tenderness.   Musculoskeletal: Normal range of motion.         General: No tenderness. Skin:     General: Skin is warm and dry.   Neurological:      General: No focal deficit present.      Mental Status: Alert and oriented to person, place and time.         Procedures          ASSESSMENT:     Coronary artery disease  Ischemic cardiomyopathy  Atrial fibrillation  Sick sinus syndrome  History of CVA  Diabetes  Dyslipidemia  Obstructive sleep apnea  Pulmonary hypertension  CKD    PLAN OF CARE:     1. Coronary artery disease -patient is doing well and increasing activity without limiting symptoms.  Continue aspirin, beta-blocker, and statin.  She was taken off of ARB due to low blood pressure.  LDL is at goal.  2. Ischemic cardiomyopathy -patient is relatively euvolemic at this time.  Given dizziness and low blood pressure we will use Lasix as needed.  She knows to call the office with more than daily 2 pound weight gain or greater than 5 pounds in 1 week.  Continue carvedilol and hold losartan.  Patient does not meet criteria for ICD.  3. Atrial fibrillation/flutter -continue beta-blocker and warfarin.  Last INR 2.3.  She has noticed increased bruising and difficulty with longer bleeding times.  We will stop aspirin.  4. Sick sinus syndrome -adequate device function on interrogation May 2021  5. History of CVA -past history of atrial arrhythmia with currently on anticoagulation with therapeutic INR  6. Chronic respiratory failure -weaning  oxygen as tolerated.  Follows with Dr. Coburn.  7. CHARLENE  8. Pulmonary hypertension  9. CKD  10. Dyslipidemia  11. Diabetes    Return to clinic 12 months             Discharge Medications          Accurate as of July 19, 2022 11:35 AM. If you have any questions, ask your nurse or doctor.            Continue These Medications      Instructions Start Date   acetaminophen 325 MG tablet  Commonly known as: TYLENOL   650 mg, Oral, Every 4 Hours PRN      acetylcysteine 20 % nebulizer solution  Commonly known as: MUCOMYST   2 mL, Nebulization, 4 Times Daily - RT      albuterol (2.5 MG/3ML) 0.083% nebulizer solution  Commonly known as: PROVENTIL   2.5 mg, Nebulization, Every 4 Hours      albuterol sulfate  (90 Base) MCG/ACT inhaler  Commonly known as: PROVENTIL HFA;VENTOLIN HFA;PROAIR HFA   2 puffs, Inhalation, Every 4 Hours PRN      AMOXICILLIN PO   Oral, DENTAL PROCEDURES/CLEANINGS      aspirin 81 MG tablet   81 mg, Oral, Daily      calcium-vitamin D 500-200 MG-UNIT tablet per tablet   1 tablet, Oral      carvedilol 3.125 MG tablet  Commonly known as: COREG   TAKE ONE TABLET BY MOUTH TWICE A DAY      Cetirizine HCl 10 MG capsule   Oral      Diclofenac Sodium 1 % gel gel  Commonly known as: VOLTAREN   4 g, Topical, 2 Times Daily      FASENRA SC   Subcutaneous, Gets one shot a month, next shot may 13 then one every 8 weeks       fluticasone 50 MCG/ACT nasal spray  Commonly known as: FLONASE   1 spray, Nasal, Daily      Fluticasone-Umeclidin-Vilant 100-62.5-25 MCG/INH inhaler  Commonly known as: TRELEGY   1 puff, Inhalation, Daily, As directed       furosemide 20 MG tablet  Commonly known as: LASIX   20 mg, Oral, Every Morning      glipizide 5 MG tablet  Commonly known as: GLUCOTROL   5 mg, Oral, Take one half tablet by mouth daily      glipizide 5 MG tablet  Commonly known as: GLUCOTROL   1 tablet, Oral, 2 Times Daily Before Meals      guaiFENesin 600 MG 12 hr tablet  Commonly known as: MUCINEX   600 mg, Oral, Every 12  Hours Scheduled      IMODIUM PO   Oral, Daily      LORazepam 0.5 MG tablet  Commonly known as: ATIVAN   No dose, route, or frequency recorded.      melatonin 5 MG tablet tablet   5 mg, Oral, Nightly      Melatonin 5 MG capsule   Oral      metFORMIN  MG 24 hr tablet  Commonly known as: GLUCOPHAGE-XR   No dose, route, or frequency recorded.      montelukast 10 MG tablet  Commonly known as: SINGULAIR   10 mg, Oral, Nightly      mupirocin 2 % ointment  Commonly known as: BACTROBAN   No dose, route, or frequency recorded.      nystatin 690500 UNIT/GM cream  Commonly known as: MYCOSTATIN   No dose, route, or frequency recorded.      oxybutynin XL 10 MG 24 hr tablet  Commonly known as: DITROPAN-XL   10 mg, Oral, Every Night at Bedtime      OXYBUTYNIN CHLORIDE PO   20 mg, Oral, Daily      polyethyl glycol-propyl glycol 0.4-0.3 % solution ophthalmic solution (artificial tears)  Commonly known as: SYSTANE   1 drop, Both Eyes, Every 1 Hour PRN      rosuvastatin 20 MG tablet  Commonly known as: CRESTOR   20 mg, Oral, Nightly      Tobramycin 300 MG/4ML nebulizer solution   No dose, route, or frequency recorded.      warfarin 4 MG tablet  Commonly known as: COUMADIN   TAKE 1/2 TABLET (2mg) on Tuesday AND Friday and TAKE ONE TABLET (4mg) ALL OTHER DAYS OR AS DIRECTED.             Thank you for allowing me to participate in the care of your patient,      Sincerely,   Jonah Regalado MD  [facility]  07/19/22  11:35 EDT

## 2022-07-20 ENCOUNTER — ANTICOAGULATION VISIT (OUTPATIENT)
Dept: PHARMACY | Facility: HOSPITAL | Age: 87
End: 2022-07-20

## 2022-07-20 DIAGNOSIS — I48.0 PAROXYSMAL ATRIAL FIBRILLATION: Primary | ICD-10-CM

## 2022-07-20 LAB — INR PPP: 1.9

## 2022-07-20 NOTE — PROGRESS NOTES
Anticoagulation Clinic Progress Note    Anticoagulation Summary  As of 2022    INR goal:  2.0-3.0   TTR:  65.9 % (3.6 y)   INR used for dosin.90 (2022)   Warfarin maintenance plan:  2 mg every Tue, Fri; 4 mg all other days   Weekly warfarin total:  24 mg   No change documented:  Jaylin Nick, Jessica   Plan last modified:  Petra Oliveira RPH (2022)   Next INR check:  8/3/2022   Priority:  Maintenance   Target end date:  Indefinite    Indications    Paroxysmal atrial fibrillation (HCC) [I48.0]             Anticoagulation Episode Summary     INR check location:      Preferred lab:      [facility]ers [facility]:   [facility]    Comments:  [facility] lab beginning 20      Anticoagulation Care Providers     Provider Role Specialty Phone number    Jonah Regalado Jr., MD Referring Cardiology [phone]          Clinic Interview:  Patient Findings     Negatives:  Signs/symptoms of thrombosis, Signs/symptoms of bleeding,   Laboratory test error suspected, Change in health, Change in alcohol use,   Change in activity, Upcoming invasive procedure, Emergency department   visit, Upcoming dental procedure, Missed doses, Extra doses, Change in   medications, Change in diet/appetite, Hospital admission, Bruising, Other   complaints      Clinical Outcomes     Negatives:  Major bleeding event, Thromboembolic event,   Anticoagulation-related hospital admission, Anticoagulation-related ED   visit, Anticoagulation-related fatality        INR History:  Anticoagulation Monitoring 2022   INR 2.00 2.30 1.90   INR Date 2022   INR Goal 2.0-3.0 2.0-3.0 2.0-3.0   Trend Down Same Same   Last Week Total 22 mg 24 mg 24 mg   Next Week Total 24 mg 24 mg 24 mg   Sun 4 mg 4 mg 4 mg   Mon 4 mg 4 mg 4 mg   Tue 2 mg 2 mg 2 mg   Wed 4 mg 4 mg 4 mg   Thu 4 mg 4 mg 4 mg   Fri 2 mg 2 mg 2 mg   Sat 4 mg 4 mg 4 mg   Visit Report - - -   Some recent data might be  hidden       Plan:  1. INR is Subtherapeutic today- see above in Anticoagulation Summary.   Will instruct Catilyn GARRETT Prietosabina to Continue their warfarin regimen- see above in Anticoagulation Summary.  2. Follow up in 2 weeks  3. Left secure voicemail to continue current dosing and follow up in 2 weeks with repeat INR.  They have been instructed to call if any changes in medications, doses, concerns, etc.     Joseluis KinseyD

## 2022-08-03 ENCOUNTER — ANTICOAGULATION VISIT (OUTPATIENT)
Dept: PHARMACY | Facility: HOSPITAL | Age: 87
End: 2022-08-03

## 2022-08-03 DIAGNOSIS — I48.0 PAROXYSMAL ATRIAL FIBRILLATION: Primary | ICD-10-CM

## 2022-08-03 LAB — INR PPP: 2.5

## 2022-08-03 NOTE — PROGRESS NOTES
Anticoagulation Clinic Progress Note    Anticoagulation Summary  As of 8/3/2022    INR goal:  2.0-3.0   TTR:  66.1 % (3.6 y)   INR used for dosin.50 (8/3/2022)   Warfarin maintenance plan:  2 mg every Tue, Fri; 4 mg all other days   Weekly warfarin total:  24 mg   No change documented:  Petra Oliveira RPH   Plan last modified:  Petra Oliveira RPH (2022)   Next INR check:  2022   Priority:  Maintenance   Target end date:  Indefinite    Indications    Paroxysmal atrial fibrillation (HCC) [I48.0]             Anticoagulation Episode Summary     INR check location:      Preferred lab:      [facility]:   [facility]    Comments:  [facility] lab beginning 20      Anticoagulation Care Providers     Provider Role Specialty Phone number    Jonah Regalado Jr., MD Referring Cardiology [phone]          Clinic Interview:  Patient Findings     Negatives:  Signs/symptoms of thrombosis, Signs/symptoms of bleeding,   Laboratory test error suspected, Change in health, Change in alcohol use,   Change in activity, Upcoming invasive procedure, Emergency department   visit, Upcoming dental procedure, Missed doses, Extra doses, Change in   medications, Change in diet/appetite, Hospital admission, Bruising, Other   complaints      Clinical Outcomes     Negatives:  Major bleeding event, Thromboembolic event,   Anticoagulation-related hospital admission, Anticoagulation-related ED   visit, Anticoagulation-related fatality        INR History:  Anticoagulation Monitoring 2022 2022 8/3/2022   INR 2.30 1.90 2.50   INR Date 2022 2022 8/3/2022   INR Goal 2.0-3.0 2.0-3.0 2.0-3.0   Trend Same Same Same   Last Week Total 24 mg 24 mg 24 mg   Next Week Total 24 mg 24 mg 24 mg   Sun 4 mg 4 mg 4 mg   Mon 4 mg 4 mg 4 mg   Tue 2 mg 2 mg 2 mg   Wed 4 mg 4 mg 4 mg   Thu 4 mg 4 mg 4 mg   Fri 2 mg 2 mg 2 mg   Sat 4 mg 4 mg 4 mg   Visit Report - - -   Some recent data might be hidden        Plan:  1. INR is Therapeutic today- see above in Anticoagulation Summary.   Will instruct Caitlyn Longoria to Continue their warfarin regimen- see above in Anticoagulation Summary.  2. Follow up in 4 weeks  3. They have been instructed to call if any changes in medications, doses, concerns, etc. Patient expresses understanding and has no further questions at this time.    Petra Oliveira, [facility]

## 2022-08-05 ENCOUNTER — OFFICE (OUTPATIENT)
Dept: URBAN - METROPOLITAN AREA CLINIC 65 | Facility: CLINIC | Age: 87
End: 2022-08-05

## 2022-08-05 VITALS
HEIGHT: 65 IN | WEIGHT: 201 LBS | HEART RATE: 89 BPM | DIASTOLIC BLOOD PRESSURE: 94 MMHG | SYSTOLIC BLOOD PRESSURE: 140 MMHG

## 2022-08-05 DIAGNOSIS — K21.9 GASTRO-ESOPHAGEAL REFLUX DISEASE WITHOUT ESOPHAGITIS: ICD-10-CM

## 2022-08-05 DIAGNOSIS — R13.10 DYSPHAGIA, UNSPECIFIED: ICD-10-CM

## 2022-08-05 DIAGNOSIS — K31.84 GASTROPARESIS: ICD-10-CM

## 2022-08-05 PROCEDURE — 99214 OFFICE O/P EST MOD 30 MIN: CPT | Performed by: INTERNAL MEDICINE

## 2022-09-02 ENCOUNTER — ANTICOAGULATION VISIT (OUTPATIENT)
Dept: PHARMACY | Facility: HOSPITAL | Age: 87
End: 2022-09-02

## 2022-09-02 DIAGNOSIS — I48.0 PAROXYSMAL ATRIAL FIBRILLATION: Primary | ICD-10-CM

## 2022-09-02 LAB — INR PPP: 1.8

## 2022-09-02 NOTE — PROGRESS NOTES
Anticoagulation Clinic Progress Note    Anticoagulation Summary  As of 2022    INR goal:  2.0-3.0   TTR:  66.1 % (3.7 y)   INR used for dosin.80 (2022)   Warfarin maintenance plan:  2 mg every Tue, Fri; 4 mg all other days   Weekly warfarin total:  24 mg   Plan last modified:  Alexander Valiente, PharmD (2022)   Next INR check:  2022   Priority:  Maintenance   Target end date:  Indefinite    Indications    Paroxysmal atrial fibrillation (HCC) [I48.0]             Anticoagulation Episode Summary     INR check location:      Preferred lab:      [facility] IN[facility]ers [facility]:   [facility]    Comments:  [facility] beginning 20      Anticoagulation Care Providers     Provider Role Specialty Phone number    Jonah Regalado Jr., MD Referring Cardiology [phone]          Clinic Interview:  Patient Findings     Positives:  Change in health, Upcoming invasive procedure    Negatives:  Signs/symptoms of thrombosis, Signs/symptoms of bleeding,   Laboratory test error suspected, Change in alcohol use, Change in   activity, Emergency department visit, Upcoming dental procedure, Missed   doses, Extra doses, Change in medications, Change in diet/appetite,   Hospital admission, Bruising, Other complaints    Comments:  Reports tested positive for COVID on . Only experienced a   cough x 1 day. Feels well today, and tested negative for COVID today. EGD   scheduled for 22, prior to which she reports Dr. Cota advised holding   warfarin 4 days prior, which she indicates Dr. Cota had cleared with   Cardiology.      Clinical Outcomes     Negatives:  Major bleeding event, Thromboembolic event,   Anticoagulation-related hospital admission, Anticoagulation-related ED   visit, Anticoagulation-related fatality    Comments:  Reports tested positive for COVID on . Only experienced a   cough x 1 day. Feels well today, and tested negative for COVID today. EGD   scheduled for 22, prior to which  she reports Dr. Cota advised holding   warfarin 4 days prior, which she indicates Dr. Cota had cleared with   Cardiology.        INR History:  Anticoagulation Monitoring 7/20/2022 8/3/2022 9/2/2022   INR 1.90 2.50 1.80   INR Date 7/20/2022 8/3/2022 9/2/2022   INR Goal 2.0-3.0 2.0-3.0 2.0-3.0   Trend Same Same Same   Last Week Total 24 mg 24 mg 24 mg   Next Week Total 24 mg 24 mg 28 mg   Sun 4 mg 4 mg Hold (9/11); Otherwise 4 mg   Mon 4 mg 4 mg Hold (9/12); Otherwise 4 mg   Tue 2 mg 2 mg Hold (9/13); Otherwise 2 mg   Wed 4 mg 4 mg 6 mg (9/14); Otherwise 4 mg   Thu 4 mg 4 mg 6 mg (9/15); Otherwise 4 mg   Fri 2 mg 2 mg 6 mg (9/2); Otherwise 2 mg   Sat 4 mg 4 mg Hold (9/10); Otherwise 4 mg   Visit Report - - -   Some recent data might be hidden       Plan:  1. INR is Subtherapeutic today- see above in Anticoagulation Summary.   Will instruct Caitlyn GARRETT Longoria to Change their warfarin regimen - see above in Anticoagulation Summary.  2. Follow up in 2.5 weeks (1 week following EGD).  3. They have been instructed to call if any changes in medications, doses, concerns, etc. Patient expresses understanding and has no further questions at this time.    Alexander Valiente, PharmD

## 2022-09-11 PROCEDURE — 93296 REM INTERROG EVL PM/IDS: CPT | Performed by: INTERNAL MEDICINE

## 2022-09-11 PROCEDURE — 93294 REM INTERROG EVL PM/LDLS PM: CPT | Performed by: INTERNAL MEDICINE

## 2022-09-14 ENCOUNTER — ON CAMPUS - OUTPATIENT (OUTPATIENT)
Dept: URBAN - METROPOLITAN AREA HOSPITAL 114 | Facility: HOSPITAL | Age: 87
End: 2022-09-14

## 2022-09-14 ENCOUNTER — HOSPITAL ENCOUNTER (OUTPATIENT)
Facility: HOSPITAL | Age: 87
Setting detail: HOSPITAL OUTPATIENT SURGERY
Discharge: HOME | End: 2022-09-14
Attending: INTERNAL MEDICINE | Admitting: INTERNAL MEDICINE

## 2022-09-14 ENCOUNTER — ANESTHESIA EVENT (OUTPATIENT)
Dept: GASTROENTEROLOGY | Facility: HOSPITAL | Age: 87
End: 2022-09-14

## 2022-09-14 ENCOUNTER — ANESTHESIA (OUTPATIENT)
Dept: GASTROENTEROLOGY | Facility: HOSPITAL | Age: 87
End: 2022-09-14

## 2022-09-14 VITALS
WEIGHT: 195 LBS | SYSTOLIC BLOOD PRESSURE: 141 MMHG | DIASTOLIC BLOOD PRESSURE: 92 MMHG | RESPIRATION RATE: 20 BRPM | HEART RATE: 80 BPM | BODY MASS INDEX: 34.55 KG/M2 | OXYGEN SATURATION: 94 % | HEIGHT: 63 IN

## 2022-09-14 DIAGNOSIS — K31.819 ANGIODYSPLASIA OF STOMACH AND DUODENUM WITHOUT BLEEDING: ICD-10-CM

## 2022-09-14 DIAGNOSIS — R13.10 DYSPHAGIA, UNSPECIFIED: ICD-10-CM

## 2022-09-14 DIAGNOSIS — K22.2 ESOPHAGEAL OBSTRUCTION: ICD-10-CM

## 2022-09-14 LAB — GLUCOSE BLDC GLUCOMTR-MCNC: 103 MG/DL (ref 70–130)

## 2022-09-14 PROCEDURE — 43235 EGD DIAGNOSTIC BRUSH WASH: CPT | Performed by: INTERNAL MEDICINE

## 2022-09-14 PROCEDURE — 0 LIDOCAINE 1 % SOLUTION: Performed by: ANESTHESIOLOGY

## 2022-09-14 PROCEDURE — 25010000002 PROPOFOL 10 MG/ML EMULSION: Performed by: ANESTHESIOLOGY

## 2022-09-14 PROCEDURE — 82962 GLUCOSE BLOOD TEST: CPT

## 2022-09-14 PROCEDURE — 43450 DILATE ESOPHAGUS 1/MULT PASS: CPT | Performed by: INTERNAL MEDICINE

## 2022-09-14 RX ORDER — LIDOCAINE HYDROCHLORIDE 10 MG/ML
INJECTION, SOLUTION INFILTRATION; PERINEURAL AS NEEDED
Status: DISCONTINUED | OUTPATIENT
Start: 2022-09-14 | End: 2022-09-14 | Stop reason: SURG

## 2022-09-14 RX ORDER — EPHEDRINE SULFATE 50 MG/ML
5 INJECTION, SOLUTION INTRAVENOUS ONCE AS NEEDED
Status: DISCONTINUED | OUTPATIENT
Start: 2022-09-14 | End: 2022-09-14 | Stop reason: HOSPADM

## 2022-09-14 RX ORDER — SODIUM CHLORIDE 0.9 % (FLUSH) 0.9 %
10 SYRINGE (ML) INJECTION AS NEEDED
Status: DISCONTINUED | OUTPATIENT
Start: 2022-09-14 | End: 2022-09-14 | Stop reason: HOSPADM

## 2022-09-14 RX ORDER — KETOCONAZOLE 20 MG/G
1 CREAM TOPICAL 2 TIMES DAILY
COMMUNITY

## 2022-09-14 RX ORDER — LABETALOL HYDROCHLORIDE 5 MG/ML
5 INJECTION, SOLUTION INTRAVENOUS
Status: DISCONTINUED | OUTPATIENT
Start: 2022-09-14 | End: 2022-09-14 | Stop reason: HOSPADM

## 2022-09-14 RX ORDER — PROPOFOL 10 MG/ML
VIAL (ML) INTRAVENOUS AS NEEDED
Status: DISCONTINUED | OUTPATIENT
Start: 2022-09-14 | End: 2022-09-14 | Stop reason: SURG

## 2022-09-14 RX ORDER — FENTANYL CITRATE 50 UG/ML
25 INJECTION, SOLUTION INTRAMUSCULAR; INTRAVENOUS
Status: DISCONTINUED | OUTPATIENT
Start: 2022-09-14 | End: 2022-09-14 | Stop reason: HOSPADM

## 2022-09-14 RX ORDER — CEPHALEXIN 500 MG/1
500 CAPSULE ORAL 3 TIMES DAILY
COMMUNITY
End: 2023-01-05 | Stop reason: SDUPTHER

## 2022-09-14 RX ORDER — ONDANSETRON 2 MG/ML
4 INJECTION INTRAMUSCULAR; INTRAVENOUS ONCE AS NEEDED
Status: DISCONTINUED | OUTPATIENT
Start: 2022-09-14 | End: 2022-09-14 | Stop reason: HOSPADM

## 2022-09-14 RX ORDER — SODIUM CHLORIDE, SODIUM LACTATE, POTASSIUM CHLORIDE, CALCIUM CHLORIDE 600; 310; 30; 20 MG/100ML; MG/100ML; MG/100ML; MG/100ML
30 INJECTION, SOLUTION INTRAVENOUS CONTINUOUS PRN
Status: DISCONTINUED | OUTPATIENT
Start: 2022-09-14 | End: 2022-09-14 | Stop reason: HOSPADM

## 2022-09-14 RX ORDER — HYDRALAZINE HYDROCHLORIDE 20 MG/ML
10 INJECTION INTRAMUSCULAR; INTRAVENOUS
Status: DISCONTINUED | OUTPATIENT
Start: 2022-09-14 | End: 2022-09-14 | Stop reason: HOSPADM

## 2022-09-14 RX ORDER — SODIUM CHLORIDE 0.9 % (FLUSH) 0.9 %
10 SYRINGE (ML) INJECTION EVERY 12 HOURS SCHEDULED
Status: DISCONTINUED | OUTPATIENT
Start: 2022-09-14 | End: 2022-09-14 | Stop reason: HOSPADM

## 2022-09-14 RX ORDER — DIPHENHYDRAMINE HYDROCHLORIDE 50 MG/ML
6.25 INJECTION INTRAMUSCULAR; INTRAVENOUS
Status: DISCONTINUED | OUTPATIENT
Start: 2022-09-14 | End: 2022-09-14 | Stop reason: HOSPADM

## 2022-09-14 RX ORDER — NALOXONE HCL 0.4 MG/ML
0.4 VIAL (ML) INJECTION AS NEEDED
Status: DISCONTINUED | OUTPATIENT
Start: 2022-09-14 | End: 2022-09-14 | Stop reason: HOSPADM

## 2022-09-14 RX ADMIN — PROPOFOL 80 MG: 10 INJECTION, EMULSION INTRAVENOUS at 10:00

## 2022-09-14 RX ADMIN — PROPOFOL 160 MCG/KG/MIN: 10 INJECTION, EMULSION INTRAVENOUS at 10:00

## 2022-09-14 RX ADMIN — LIDOCAINE HYDROCHLORIDE 50 MG: 10 INJECTION, SOLUTION INFILTRATION; PERINEURAL at 10:00

## 2022-09-14 RX ADMIN — SODIUM CHLORIDE, POTASSIUM CHLORIDE, SODIUM LACTATE AND CALCIUM CHLORIDE 30 ML/HR: 600; 310; 30; 20 INJECTION, SOLUTION INTRAVENOUS at 09:23

## 2022-09-14 NOTE — ANESTHESIA PREPROCEDURE EVALUATION
Anesthesia Evaluation     NPO Solid Status: > 8 hours  NPO Liquid Status: > 2 hours           Airway   Mallampati: II  no difficulty expected  Dental - normal exam     Pulmonary - normal exam   (+) pneumonia , asthma,shortness of breath, sleep apnea,     ROS comment: H/o hypoxemic respiratory failure  PE comment: nonlabored  Cardiovascular - normal exam  Exercise tolerance: poor (<4 METS)    Rhythm: regular  Rate: normal    (+) pacemaker pacemaker, hypertension, valvular problems/murmurs (mild MR) MR, CAD, dysrhythmias (Sick Sinus Syndrome) Atrial Fib, Paroxysmal Atrial Fib, angina, CHF (combined systolic & diastolic) , STAHL, hyperlipidemia,  carotid artery disease carotid bilateral      Neuro/Psych  (+) CVA,    GI/Hepatic/Renal/Endo    (+) morbid obesity, GERD,  renal disease (CKD-stage 3), diabetes mellitus type 2,     Musculoskeletal     (+) arthralgias, back pain, chronic pain, neck pain,   Abdominal    Substance History      OB/GYN          Other   arthritis, blood dyscrasia (CLL, on coumadin),   history of cancer (skin cancer, CLL)                  Anesthesia Plan    ASA 3     MAC       Anesthetic plan, risks, benefits, and alternatives have been provided, discussed and informed consent has been obtained with: patient.        CODE STATUS:

## 2022-09-14 NOTE — ANESTHESIA POSTPROCEDURE EVALUATION
"Patient: Caitlyn Longoria    Procedure Summary     Date: 09/14/22 Room / Location:  [facility] /  [facility]    Anesthesia Start: 0955 Anesthesia Stop: 1014    Procedure: ESOPHAGOGASTRODUODENOSCOPY with 54fr main dilatation (N/A Esophagus) Diagnosis:     Surgeons: Enio Cota MD Provider: Josue Mcarthur MD    Anesthesia Type: MAC ASA Status: 3          Anesthesia Type: MAC    Vitals  No vitals data found for the desired time range.          Post Anesthesia Care and Evaluation    Patient location during evaluation: bedside  Pain management: adequate    Airway patency: patent  Anesthetic complications: No anesthetic complications    Cardiovascular status: acceptable  Respiratory status: acceptable  Hydration status: acceptable    Comments: */84 (BP Location: Left arm, Patient Position: Lying)   Pulse 62   Resp 20   Ht 160 cm (63\")   Wt 88.5 kg (195 lb)   SpO2 95%   BMI 34.54 kg/m²         "

## 2022-09-14 NOTE — H&P
[facility]   HISTORY AND PHYSICAL    Patient Name: Caitlyn Longoria  : 1934  MRN: [id number]  Primary Care Physician:  Zenaida Suarez MD  Date of admission: 2022    Subjective   Subjective     Chief Complaint: difficulty swallowing    History of Present Illness  sollds get stuck  Review of Systems   HENT: Positive for trouble swallowing.         Personal History     Past Medical History:   Diagnosis Date   • Aortic calcification ([facility])     mild, on echo 2017   • Aortic regurgitation     Trace   • Asthma    • Atrial fibrillation ([facility])    • CAD (coronary artery disease)    • Chronic combined systolic and diastolic congestive heart failure ([facility])    • CKD (chronic kidney disease) stage 3, GFR 30-59 ml/min ([facility])    • Coronary artery disease involving native coronary artery of native heart with angina pectoris ([facility])    • Disc degeneration, lumbar    • DM type 2 (diabetes mellitus, type 2) ([facility])    • GERD (gastroesophageal reflux disease)    • History of aneurysm     right femoral artery s/p LHC   • History of blood transfusion    • History of fracture    • History of vitamin D deficiency    • Hyperlipidemia    • Hypertension    • Mild mitral regurgitation    • Mitral annular calcification     2017- echo, moderate   • PAF (paroxysmal atrial fibrillation) ([facility])    • Peripheral neuropathy    • Skin cancer     Left hand   • Sleep apnea     bipap   • SSS (sick sinus syndrome) ([facility])    • Stroke (cerebrum) ([facility])    • Tricuspid regurgitation     Trace       Past Surgical History:   Procedure Laterality Date   • BRONCHOSCOPY Bilateral 10/6/2020    Procedure: BRONCHOSCOPY with BILATERAL LUNG washings;  Surgeon: Juno Coburn MD;  Location: [facility] ENDOSCOPY;  Service: Pulmonary;  Laterality: Bilateral;  PRE: purulent bronchitis  POST: PURULENT BRONCHITIS   • BRONCHOSCOPY Bilateral 10/9/2020    Procedure: BRONCHOSCOPY with washing;  Surgeon: Juno Coburn MD;  Location: [facility] ENDOSCOPY;   Service: Pulmonary;  Laterality: Bilateral;  pre/post - mucous plug     • CARDIAC CATHETERIZATION     • CARDIAC ELECTROPHYSIOLOGY PROCEDURE N/A 2/7/2020    Procedure: PPM generator change - dual  medtronic;  Surgeon: Kiel Filed MD;  Location: [facility] INVASIVE LOCATION;  Service: Cardiology;  Laterality: N/A;   • CHOLECYSTECTOMY     • CORONARY STENT PLACEMENT     • HERNIA REPAIR      hital hernia   • HYSTERECTOMY     • PACEMAKER IMPLANTATION     • REPLACEMENT TOTAL KNEE Bilateral        Family History: family history includes Colon cancer in her sister; Colon polyps in her mother; Diabetes in her niece; Heart disease in her brother, father, and mother; Hypertension in her brother, daughter, father, maternal aunt, maternal grandfather, maternal grandmother, mother, paternal grandfather, paternal grandmother, sister, and son; Stroke in her father. Otherwise pertinent FHx was reviewed and not pertinent to current issue.    Social History:  reports that she has never smoked. She has never used smokeless tobacco. She reports that she does not drink alcohol and does not use drugs.    Home Medications:  Amoxicillin, Benralizumab, Cetirizine HCl, Diclofenac Sodium, Fluticasone-Umeclidin-Vilant, LORazepam, Loperamide HCl, Melatonin, Oxybutynin Chloride, Tobramycin, acetaminophen, acetylcysteine, albuterol, albuterol sulfate HFA, calcium-vitamin D, carvedilol, cephalexin, fluticasone, furosemide, glipizide, guaiFENesin, ketoconazole, melatonin, metFORMIN ER, montelukast, mupirocin, nystatin, oxybutynin XL, polyethyl glycol-propyl glycol, rosuvastatin, and warfarin    Allergies:  Allergies   Allergen Reactions   • Accupril [Quinapril Hcl] Swelling, Other (See Comments), GI Intolerance and Delirium     HA, constipation    • Ahist [Chlorpheniramine] Nausea Only, Other (See Comments) and Dizziness     Headache, Blurred vision   • Clarithromycin Nausea Only, Other (See Comments) and Mental Status Change     HA,  Depression, Flushing   • Esomeprazole GI Intolerance   • Levalbuterol Swelling   • Levocetirizine Diarrhea and GI Intolerance   • Lipitor [Atorvastatin] Other (See Comments) and Myalgia     Dark urine   • Omeprazole Nausea Only and Other (See Comments)     HA   • Pravachol [Pravastatin] Nausea Only and GI Intolerance     Bloated, Constipation, HA   • Sulindac Other (See Comments) and Myalgia     HA, joint pain, bruising   • Valdecoxib Irritability   • Chlorcyclizine Unknown - Low Severity   • Diclofenac Sodium Unknown - Low Severity   • Diphenhydramine Unknown - Low Severity   • Lodine [Etodolac] Unknown - Low Severity   • Sulfa Antibiotics Unknown - Low Severity       Objective    Objective     Vitals:   Heart Rate:  [62] 62  Resp:  [20] 20  BP: (135)/(84) 135/84    Physical Exam  HENT:      Right Ear: External ear normal.      Left Ear: External ear normal.      Mouth/Throat:      Pharynx: Oropharynx is clear.   Eyes:      Conjunctiva/sclera: Conjunctivae normal.   Cardiovascular:      Rate and Rhythm: Normal rate.      Pulses: Normal pulses.   Pulmonary:      Effort: Pulmonary effort is normal.   Abdominal:      General: Abdomen is flat.   Skin:     General: Skin is warm and dry.   Neurological:      General: No focal deficit present.      Mental Status: She is alert.   Psychiatric:         Mood and Affect: Mood normal.         Result Review    Result Review:  I have personally reviewed the results from the time of this admission to 9/14/2022 10:00 EDT and agree with these findings:  []  Laboratory list / accordion  []  Microbiology  []  Radiology  []  EKG/Telemetry   []  Cardiology/Vascular   []  Pathology  []  Old records  []  Other:      Assessment & Plan   Assessment / Plan     Brief Patient Summary:  Caitlyn Longoria is a 87 y.o. female   Dysphagia for solids    Active Hospital Problems:  There are no active hospital problems to display for this patient.    Plan: Upper tract endoscopy with dilation , risks,  alternatives and benefits discussed with patient and patient is agreeable to proceed      DVT prophylaxis:  No DVT prophylaxis order currently exists.    CODE STATUS:       Admission Status:  I believe this patient meets outpatient  status.    Enio Cota MD

## 2022-09-28 ENCOUNTER — ANTICOAGULATION VISIT (OUTPATIENT)
Dept: PHARMACY | Facility: HOSPITAL | Age: 87
End: 2022-09-28

## 2022-09-28 DIAGNOSIS — I48.0 PAROXYSMAL ATRIAL FIBRILLATION: Primary | ICD-10-CM

## 2022-09-28 LAB — INR PPP: 2.2

## 2022-09-29 NOTE — PROGRESS NOTES
Anticoagulation Clinic Progress Note    Anticoagulation Summary  As of 2022    INR goal:  2.0-3.0   TTR:  65.9 % (3.8 y)   INR used for dosin.20 (2022)   Warfarin maintenance plan:  2 mg every Tu, Fri; 4 mg all other days   Weekly warfarin total:  24 mg   No change documented:  Jaylin Nick, PharmD   Plan last modified:  Alexander Valiente PharmD (2022)   Next INR check:  10/12/2022   Priority:  Maintenance   Target end date:  Indefinite    Indications    Paroxysmal atrial fibrillation (HCC) [I48.0]             Anticoagulation Episode Summary     INR check location:      Preferred lab:      Send IN[facility] reminders [facility]:   [facility]    [facility]  [facility]      Anticoagulation Care Providers     Provider Role Specialty Phone number    Jonah Regalado Jr., MD Referring Cardiology [phone]          Clinic Interview:  Patient Findings     Negatives:  Signs/symptoms of thrombosis, Signs/symptoms of bleeding,   Laboratory test error suspected, Change in health, Change in alcohol use,   Change in activity, Upcoming invasive procedure, Emergency department   visit, Upcoming dental procedure, Missed doses, Extra doses, Change in   medications, Change in diet/appetite, Hospital admission, Bruising, Other   complaints      Clinical Outcomes     Negatives:  Major bleeding event, Thromboembolic event,   Anticoagulation-related hospital admission, Anticoagulation-related ED   visit, Anticoagulation-related fatality        INR History:  Anticoagulation Monitoring 8/3/2022 2022 2022   INR 2.50 1.80 2.20   INR Date 8/3/2022 2022 2022   INR Goal 2.0-3.0 2.0-3.0 2.0-3.0   Trend Same Same Same   Last Week Total 24 mg 24 mg 24 mg   Next Week Total 24 mg 28 mg 24 mg   Sun 4 mg Hold (); Otherwise 4 mg 4 mg   Mon 4 mg Hold (); Otherwise 4 mg 4 mg   Tue 2 mg Hold (); Otherwise 2 mg 2 mg   Wed 4 mg 6 mg (); Otherwise 4 mg 4 mg   Thu 4 mg 6 mg (9/15);  Otherwise 4 mg 4 mg   Fri 2 mg 6 mg (9/2); Otherwise 2 mg 2 mg   Sat 4 mg Hold (9/10); Otherwise 4 mg 4 mg   Visit Report - - -   Some recent data might be hidden       Plan:  1. INR is Therapeutic today- see above in Anticoagulation Summary.   Will instruct Caitlyn GARRETT Prietojaydonmaxx to Continue their warfarin regimen- see above in Anticoagulation Summary.  2. Follow up in 2 weeks  3. They have been instructed to call if any changes in medications, doses, concerns, etc. Patient expresses understanding and has no further questions at this time.    Jaylin Nick, PharmD

## 2022-10-04 ENCOUNTER — INFUSION (OUTPATIENT)
Dept: ONCOLOGY | Facility: HOSPITAL | Age: 87
End: 2022-10-04

## 2022-10-04 ENCOUNTER — LAB (OUTPATIENT)
Dept: LAB | Facility: HOSPITAL | Age: 87
End: 2022-10-04

## 2022-10-04 ENCOUNTER — OFFICE VISIT (OUTPATIENT)
Dept: ONCOLOGY | Facility: CLINIC | Age: 87
End: 2022-10-04

## 2022-10-04 VITALS
WEIGHT: 198.9 LBS | TEMPERATURE: 96.4 F | RESPIRATION RATE: 18 BRPM | OXYGEN SATURATION: 94 % | HEART RATE: 92 BPM | SYSTOLIC BLOOD PRESSURE: 103 MMHG | BODY MASS INDEX: 35.24 KG/M2 | DIASTOLIC BLOOD PRESSURE: 65 MMHG | HEIGHT: 63 IN

## 2022-10-04 DIAGNOSIS — C91.12 CLL (CHRONIC LYMPHOID LEUKEMIA) IN RELAPSE: ICD-10-CM

## 2022-10-04 DIAGNOSIS — C91.12 CLL (CHRONIC LYMPHOID LEUKEMIA) IN RELAPSE: Primary | ICD-10-CM

## 2022-10-04 LAB
ALBUMIN SERPL-MCNC: 4.2 G/DL (ref 3.5–5.2)
ALBUMIN/GLOB SERPL: 1.8 G/DL (ref 1.1–2.4)
ALP SERPL-CCNC: 96 U/L (ref 38–116)
ALT SERPL W P-5'-P-CCNC: 16 U/L (ref 0–33)
ANION GAP SERPL CALCULATED.3IONS-SCNC: 12.2 MMOL/L (ref 5–15)
AST SERPL-CCNC: 20 U/L (ref 0–32)
BASOPHILS # BLD AUTO: 0.01 10*3/MM3 (ref 0–0.2)
BASOPHILS NFR BLD AUTO: 0.1 % (ref 0–1.5)
BILIRUB SERPL-MCNC: 0.7 MG/DL (ref 0.2–1.2)
BUN SERPL-MCNC: 22 MG/DL (ref 6–20)
BUN/CREAT SERPL: 24.7 (ref 7.3–30)
CALCIUM SPEC-SCNC: 9.4 MG/DL (ref 8.5–10.2)
CHLORIDE SERPL-SCNC: 105 MMOL/L (ref 98–107)
CO2 SERPL-SCNC: 23.8 MMOL/L (ref 22–29)
CREAT SERPL-MCNC: 0.89 MG/DL (ref 0.6–1.1)
DEPRECATED RDW RBC AUTO: 54.8 FL (ref 37–54)
EGFRCR SERPLBLD CKD-EPI 2021: 62.8 ML/MIN/1.73
EOSINOPHIL # BLD AUTO: 0 10*3/MM3 (ref 0–0.4)
EOSINOPHIL NFR BLD AUTO: 0 % (ref 0.3–6.2)
ERYTHROCYTE [DISTWIDTH] IN BLOOD BY AUTOMATED COUNT: 15.4 % (ref 12.3–15.4)
GLOBULIN UR ELPH-MCNC: 2.4 GM/DL (ref 1.8–3.5)
GLUCOSE SERPL-MCNC: 102 MG/DL (ref 74–124)
HCT VFR BLD AUTO: 40.1 % (ref 34–46.6)
HGB BLD-MCNC: 12.9 G/DL (ref 12–15.9)
IGA1 MFR SER: <50 MG/DL (ref 70–400)
IGG1 SER-MCNC: 739 MG/DL (ref 700–1600)
IGM SERPL-MCNC: <25 MG/DL (ref 40–230)
IMM GRANULOCYTES # BLD AUTO: 0.04 10*3/MM3 (ref 0–0.05)
IMM GRANULOCYTES NFR BLD AUTO: 0.3 % (ref 0–0.5)
LYMPHOCYTES # BLD AUTO: 8.72 10*3/MM3 (ref 0.7–3.1)
LYMPHOCYTES NFR BLD AUTO: 73.5 % (ref 19.6–45.3)
MCH RBC QN AUTO: 31.2 PG (ref 26.6–33)
MCHC RBC AUTO-ENTMCNC: 32.2 G/DL (ref 31.5–35.7)
MCV RBC AUTO: 97.1 FL (ref 79–97)
MONOCYTES # BLD AUTO: 0.23 10*3/MM3 (ref 0.1–0.9)
MONOCYTES NFR BLD AUTO: 1.9 % (ref 5–12)
NEUTROPHILS NFR BLD AUTO: 2.87 10*3/MM3 (ref 1.7–7)
NEUTROPHILS NFR BLD AUTO: 24.2 % (ref 42.7–76)
NRBC BLD AUTO-RTO: 0.2 /100 WBC (ref 0–0.2)
PLATELET # BLD AUTO: 129 10*3/MM3 (ref 140–450)
PMV BLD AUTO: 9.6 FL (ref 6–12)
POTASSIUM SERPL-SCNC: 4.1 MMOL/L (ref 3.5–4.7)
PROT SERPL-MCNC: 6.6 G/DL (ref 6.3–8)
RBC # BLD AUTO: 4.13 10*6/MM3 (ref 3.77–5.28)
SODIUM SERPL-SCNC: 141 MMOL/L (ref 134–145)
WBC NRBC COR # BLD: 11.87 10*3/MM3 (ref 3.4–10.8)

## 2022-10-04 PROCEDURE — 80053 COMPREHEN METABOLIC PANEL: CPT

## 2022-10-04 PROCEDURE — 96366 THER/PROPH/DIAG IV INF ADDON: CPT

## 2022-10-04 PROCEDURE — 99214 OFFICE O/P EST MOD 30 MIN: CPT | Performed by: INTERNAL MEDICINE

## 2022-10-04 PROCEDURE — 85025 COMPLETE CBC W/AUTO DIFF WBC: CPT

## 2022-10-04 PROCEDURE — 36415 COLL VENOUS BLD VENIPUNCTURE: CPT

## 2022-10-04 PROCEDURE — 96365 THER/PROPH/DIAG IV INF INIT: CPT

## 2022-10-04 PROCEDURE — 25010000002 IMMUNE GLOBULIN (HUMAN) 30 GM/300ML SOLUTION: Performed by: INTERNAL MEDICINE

## 2022-10-04 PROCEDURE — 82784 ASSAY IGA/IGD/IGG/IGM EACH: CPT | Performed by: INTERNAL MEDICINE

## 2022-10-04 RX ORDER — DIPHENHYDRAMINE HYDROCHLORIDE 50 MG/ML
50 INJECTION INTRAMUSCULAR; INTRAVENOUS AS NEEDED
Status: CANCELLED | OUTPATIENT
Start: 2022-10-04

## 2022-10-04 RX ORDER — FAMOTIDINE 10 MG/ML
20 INJECTION, SOLUTION INTRAVENOUS AS NEEDED
Status: CANCELLED | OUTPATIENT
Start: 2022-10-04

## 2022-10-04 RX ORDER — MEPERIDINE HYDROCHLORIDE 50 MG/ML
25 INJECTION INTRAMUSCULAR; INTRAVENOUS; SUBCUTANEOUS
Status: CANCELLED | OUTPATIENT
Start: 2022-10-04 | End: 2022-10-05

## 2022-10-04 RX ORDER — HYDROXYZINE PAMOATE 25 MG/1
25 CAPSULE ORAL ONCE
Status: CANCELLED | OUTPATIENT
Start: 2022-10-04

## 2022-10-04 RX ORDER — ACETAMINOPHEN 325 MG/1
650 TABLET ORAL ONCE
Status: CANCELLED | OUTPATIENT
Start: 2022-10-04

## 2022-10-04 RX ORDER — SODIUM CHLORIDE 9 MG/ML
250 INJECTION, SOLUTION INTRAVENOUS ONCE
Status: CANCELLED | OUTPATIENT
Start: 2022-10-04

## 2022-10-04 RX ORDER — HYDROXYZINE PAMOATE 25 MG/1
25 CAPSULE ORAL ONCE
Status: COMPLETED | OUTPATIENT
Start: 2022-10-04 | End: 2022-10-04

## 2022-10-04 RX ORDER — SODIUM CHLORIDE 9 MG/ML
250 INJECTION, SOLUTION INTRAVENOUS ONCE
Status: COMPLETED | OUTPATIENT
Start: 2022-10-04 | End: 2022-10-04

## 2022-10-04 RX ORDER — ACETAMINOPHEN 325 MG/1
650 TABLET ORAL ONCE
Status: COMPLETED | OUTPATIENT
Start: 2022-10-04 | End: 2022-10-04

## 2022-10-04 RX ADMIN — ACETAMINOPHEN 650 MG: 325 TABLET, FILM COATED ORAL at 09:38

## 2022-10-04 RX ADMIN — SODIUM CHLORIDE 250 ML: 9 INJECTION, SOLUTION INTRAVENOUS at 09:38

## 2022-10-04 RX ADMIN — IMMUNE GLOBULIN INFUSION (HUMAN) 30 G: 100 INJECTION, SOLUTION INTRAVENOUS; SUBCUTANEOUS at 09:59

## 2022-10-04 RX ADMIN — HYDROXYZINE PAMOATE 25 MG: 25 CAPSULE ORAL at 09:38

## 2022-10-04 NOTE — PROGRESS NOTES
Subjective .     REASONS FOR FOLLOW UP:  1. chronic lymphocytic leukemia with recurrent lung infections    Referring MD:    Amarilis Suarez MD    History of Present Illness patient is an.89yo female with  stage 0 CLL which is never required treatments.    Over the last year however she has had multiple hospitalizations for lung infections predominantly viral probably also bacterial and at her last hospitalization in October we rechecked her gammaglobulins which were low and opted to start IV IgG monthly to see if this would keep her out of the hospital.  Since starting IV IgG she has not been hospitalized at all and this is excellent for quality of life.    She has continued on monthly IVIG since October through March for the last 2 years and is tolerating it well     patient  happily reports she has had no infection since her IVIG started.  She reports feeling quite well with walking and doing exercise classes including yoga.  Her body aches are improving as is her energy level.    We are ready to stop IV IgG again for the winter months and she is a little ambivalent about this because she has poor venous access and she will try as long as she can get the IV in.  She is not interested in a port at this time and I do not think she will be able to get subcutaneous IVIG because she is on Coumadin    No fever sweats or weight loss  Past Medical History:   Diagnosis Date   • Aortic calcification ([facility])     mild, on echo 12/17/2017   • Aortic regurgitation     Trace   • Asthma    • Atrial fibrillation ([facility])    • CAD (coronary artery disease)    • Chronic combined systolic and diastolic congestive heart failure ([facility])    • CKD (chronic kidney disease) stage 3, GFR 30-59 ml/min ([facility])    • Coronary artery disease involving native coronary artery of native heart with angina pectoris ([facility])    • Disc degeneration, lumbar    • DM type 2 (diabetes mellitus, type 2) ([facility])    • GERD (gastroesophageal reflux disease)    • History of  aneurysm     right femoral artery s/p LHC   • History of blood transfusion    • History of fracture    • History of vitamin D deficiency    • Hyperlipidemia    • Hypertension    • Mild mitral regurgitation    • Mitral annular calcification     12/8/2017- echo, moderate   • PAF (paroxysmal atrial fibrillation) (HCC)    • Peripheral neuropathy    • Skin cancer     Left hand   • Sleep apnea     bipap   • SSS (sick sinus syndrome) (HCC)    • Stroke (cerebrum) (HCC)    • Tricuspid regurgitation     Trace       ONCOLOGIC HISTORY:    [facility] HISTORY  Patient is an 85-year-old female whom I had seen in 2014 when she was hospitalized at [facility] and was diagnosed with chronic lymphocytic leukemia.  She has not been to see me in over 4 years and is following with Dr. Suarez her primary care doctor and her white count is gone up a little higher than usual to 22,000 and she is referred back to us for evaluation.  We are doing a telephone visit because of the coronavirus pandemic and her age and susceptibility.    When I originally saw her in 2014 she was brought into the ER unresponsive in septic shock on 03/25/2014 with methicillin-resistant staphylococcus identified in her blood cultures. The patient has been on antibiotic with improvement, but had some residual kidney damage with a creatinine in the 4-range requiring hemodialysis, and was noted on admission to have an elevated white count with lymphocytosis, normal hemoglobin, but a platelet count of 95,000, and over the course of the illness her platelet count normalized and the white count had gone up into the 20,000 range with lymphocytosis. A flow cytometry was sent which is consistent with CLL, she came to our office once or twice and then stopped coming because she was so stable     She tells me now she was hospitalized recently with rhinovirus infection and has had diagnosed with asthma and is having a lot of respiratory issues but does not require oxygen.She is at the  [facility] home in independent living and managing fairly well    She denies fever sweats or weight loss.  Her last white count was on 5/26/2020  Showed a white count of 18,000 with 66 lymphs and 27 neutrophils platelet white hemoglobin is 12.6 platelet 188,000    I reassured Christopher that no treatment is indicated for this level of leukocytosis from CLL and if she has no systemic complaints I do not feel strongly about doing CAT scans at her age but I would like to physically examine her in the office in a couple of months to make sure there is no obvious adenopathy or hepatosplenomegaly.    She does have recurrent infections and we can check quantitative immunoglobulins to see how low they are as another adjunctive option to prevent recurrent infections if she has hypogammaglobulinemia    She remains on Coumadin for atrial fibrillation      Current Outpatient Medications on File Prior to Visit   Medication Sig Dispense Refill   • acetaminophen (TYLENOL) 325 MG tablet Take 2 tablets by mouth Every 4 (Four) Hours As Needed for Mild Pain .     • acetylcysteine (MUCOMYST) 20 % nebulizer solution Take 2 mL by nebulization 4 (Four) Times a Day.     • albuterol (PROVENTIL) (2.5 MG/3ML) 0.083% nebulizer solution Take 2.5 mg by nebulization Every 4 (Four) Hours.     • albuterol sulfate  (90 Base) MCG/ACT inhaler Inhale 2 puffs Every 4 (Four) Hours As Needed for Wheezing. 1 inhaler 3   • AMOXICILLIN PO Take  by mouth. DENTAL PROCEDURES/CLEANINGS     • Benralizumab (FASENRA SC) Inject  under the skin into the appropriate area as directed. Gets one shot a month, next shot may 13 then one every 8 weeks     • Calcium Carbonate-Vitamin D (calcium-vitamin D) 500-200 MG-UNIT tablet per tablet Take 1 tablet by mouth.     • carvedilol (COREG) 3.125 MG tablet TAKE ONE TABLET BY MOUTH TWICE A  tablet 2   • cephalexin (KEFLEX) 500 MG capsule Take 500 mg by mouth 3 (Three) Times a Day.     • Cetirizine HCl 10 MG capsule Take  by  mouth.     • Diclofenac Sodium (VOLTAREN) 1 % gel gel Apply 4 g topically to the appropriate area as directed 2 (Two) Times a Day. 100 g 0   • fluticasone (FLONASE) 50 MCG/ACT nasal spray 1 spray into the nostril(s) as directed by provider Daily.     • Fluticasone-Umeclidin-Vilant (TRELEGY) 100-62.5-25 MCG/INH inhaler Inhale 1 puff Daily. As directed     • furosemide (LASIX) 20 MG tablet Take 1 tablet by mouth As Needed (For weight gain of greater than 2 pounds in 1 day or 5 pounds in 1 week). 30 tablet 11   • glipizide (GLUCOTROL) 5 MG tablet Take 5 mg by mouth. Take one half tablet by mouth daily     • glipizide (GLUCOTROL) 5 MG tablet Take 1 tablet by mouth 2 (Two) Times a Day Before Meals.     • guaiFENesin (MUCINEX) 600 MG 12 hr tablet Take 1 tablet by mouth Every 12 (Twelve) Hours.     • ketoconazole (NIZORAL) 2 % cream Apply 1 application topically to the appropriate area as directed 2 (Two) Times a Day.     • Loperamide HCl (IMODIUM PO) Take  by mouth Daily.     • LORazepam (ATIVAN) 0.5 MG tablet      • Melatonin 5 MG capsule Take  by mouth.     • melatonin 5 MG tablet tablet Take 5 mg by mouth Every Night.     • metFORMIN ER (GLUCOPHAGE-XR) 500 MG 24 hr tablet      • montelukast (SINGULAIR) 10 MG tablet Take 10 mg by mouth Every Night.     • mupirocin (BACTROBAN) 2 % ointment      • nystatin (MYCOSTATIN) 545596 UNIT/GM cream      • OXYBUTYNIN CHLORIDE PO Take 20 mg by mouth Daily.     • oxybutynin XL (DITROPAN-XL) 10 MG 24 hr tablet Take 10 mg by mouth every night at bedtime.     • polyethyl glycol-propyl glycol (SYSTANE) 0.4-0.3 % solution ophthalmic solution (artificial tears) Administer 1 drop to both eyes Every 1 (One) Hour As Needed.     • rosuvastatin (CRESTOR) 20 MG tablet Take 20 mg by mouth Every Night.     • Tobramycin 300 MG/4ML nebulizer solution      • warfarin (COUMADIN) 4 MG tablet TAKE 1/2 TABLET (2mg) on Tuesday AND Friday and TAKE ONE TABLET (4mg) ALL OTHER DAYS OR AS DIRECTED. 85 tablet 1      No current facility-administered medications on file prior to visit.       ALLERGIES:     Allergies   Allergen Reactions   • Accupril [Quinapril Hcl] Swelling, Other (See Comments), GI Intolerance and Delirium     HA, constipation    • Ahist [Chlorpheniramine] Nausea Only, Other (See Comments) and Dizziness     Headache, Blurred vision   • Clarithromycin Nausea Only, Other (See Comments) and Mental Status Change     HA, Depression, Flushing   • Esomeprazole GI Intolerance   • Levalbuterol Swelling   • Levocetirizine Diarrhea and GI Intolerance   • Lipitor [Atorvastatin] Other (See Comments) and Myalgia     Dark urine   • Omeprazole Nausea Only and Other (See Comments)     HA   • Pravachol [Pravastatin] Nausea Only and GI Intolerance     Bloated, Constipation, HA   • Sulindac Other (See Comments) and Myalgia     HA, joint pain, bruising   • Valdecoxib Irritability   • Chlorcyclizine Unknown - Low Severity   • Diclofenac Sodium Unknown - Low Severity   • Diphenhydramine Unknown - Low Severity   • Lodine [Etodolac] Unknown - Low Severity   • Sulfa Antibiotics Unknown - Low Severity       Social History     Socioeconomic History   • Marital status: Single   Tobacco Use   • Smoking status: Never Smoker   • Smokeless tobacco: Never Used   • Tobacco comment: caffeine use- soda   Vaping Use   • Vaping Use: Never used   Substance and Sexual Activity   • Alcohol use: Never   • Drug use: No   • Sexual activity: Defer         Cancer-related family history includes Colon cancer in her sister.     I have reviewed the patient's medical history in detail and updated the computerized patient record.    Review of Systems   Constitutional: Negative for appetite change, chills, diaphoresis, fatigue, fever and unexpected weight change.   HENT: Negative for mouth sores and sore throat.    Eyes: Negative for visual disturbance.   Respiratory: Positive for shortness of breath (stable). Negative for cough and wheezing.     Gastrointestinal: Negative.  Negative for abdominal pain, diarrhea, nausea and vomiting.   Genitourinary: Negative.  Negative for dysuria and frequency.   Musculoskeletal: Positive for back pain.   Neurological: Negative.  Negative for dizziness and weakness.   Hematological: Does not bruise/bleed easily.   Psychiatric/Behavioral: Negative.  The patient is not nervous/anxious.    All other systems reviewed and are negative.  I have reviewed and confirmed the accuracy of the ROS as documented by the MA/LPN/RN Nanda Hines MA      Objective      There were no vitals filed for this visit.  Current Status 3/1/2022   ECOG score 1     There were no vitals filed for this visit.    CONSTITUTIONAL:  Vital signs reviewed.  No distress, looks comfortable.  EYES:  Conjunctiva and lids unremarkable.  PERRLA  EARS,NOSE,MOUTH,THROAT: Hearing intact.  Lips, teeth, gums appear unremarkable.  RESPIRATORY:  Normal respiratory effort.  Lungs clear to auscultation on the right decreased breath breath sounds lower half of the left lung  CARDIOVASCULAR:  Normal S1, S2.  No murmurs rubs or gallops.  No significant lower extremity edema.  GASTROINTESTINAL: Abdomen appears unremarkable.  Nontender.  No hepatomegaly.  No splenomegaly.  LYMPHATIC:  No cervical, supraclavicular, axillary lymphadenopathy.  SKIN:  Warm.  No rashes.  Numerous ecchymosis on her arms and legs  PSYCHIATRIC:  Normal judgment and insight.  Normal mood and affect.       I have reexamined the patient 10/04/2022 and the results are consistent with the previously documented exam. Nanda Hines MA       RECENT LABS:          Results from last 7 days   Lab Units 09/28/22  0000   INR  2.20            Assessment & Plan    1. Chronic lymphocytic leukemia  -stage 0 since 2014 untreated    2.  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  The patient received IVIG treatments October 2020 through March 2021.  We  have held IVIG since that time and the patient is happy to report no infections aside from some urinary tract infection which she has chronically.  Otherwise she has been feeling very well.  · Resume IV IgG from October through March 2022 monthly  · No infections on IV IgG in the winter months    3.  COPD/asthma /emphysema on home O2-recurrent respiratory infections with RSV and rhinovirus and bacteria  · paralysed left hemidiaphragm    4.  Atrial fibrillation on Coumadin    5.  Hypertension/hypercholesterolemia/type 2 diabetes on treatment    6.  Vaccinated against coronavirus    7.  Mild anemia-continued to observe for now    Plan  1.  IVIG today   2 follow up with  Dr. Larkin in Oct to resume ivigg  3. . No treatment indicated for CLL at this time        Nanda Hines MA

## 2022-10-04 NOTE — PROGRESS NOTES
Subjective .     REASONS FOR FOLLOW UP:  1. chronic lymphocytic leukemia with recurrent lung infections    Referring MD:    Amarilis Suarez MD    History of Present Illness patient is an.age female with 2014 with stage 0 CLL which is never required treatments.    In 2019 she  had multiple hospitalizations for lung infections predominantly viral probably also bacterial and at her last hospitalization in October we rechecked her gammaglobulins which were low and opted to start IV IgG monthly to see if this would keep her out of the hospital.    She has continued on monthly IVIG since October through March 2021/2022.  The patient  happily reports she has had no infection since starting IVIG.  She reports feeling quite well with walking and doing exercise classes including yoga.  Her body aches are improving as is her energy level.  .  Patient has been a difficult venous stick and is questioning about a port today.  We discussed this could be an option we could discuss further . She states they have been able to obtain an IV recently however that may be needed in the future.  She denies any recent fevers or infections.    No fever sweats or weight loss    Past Medical History:   Diagnosis Date   • Aortic calcification (HCC)     mild, on echo 12/17/2017   • Aortic regurgitation     Trace   • Asthma    • Atrial fibrillation ([facility])    • CAD (coronary artery disease)    • Chronic combined systolic and diastolic congestive heart failure ([facility])    • CKD (chronic kidney disease) stage 3, GFR 30-59 ml/min ([facility])    • Coronary artery disease involving native coronary artery of native heart with angina pectoris ([facility])    • Disc degeneration, lumbar    • DM type 2 (diabetes mellitus, type 2) ([facility])    • GERD (gastroesophageal reflux disease)    • History of aneurysm     right femoral artery s/p LHC   • History of blood transfusion    • History of fracture    • History of vitamin D deficiency    • Hyperlipidemia    • Hypertension    •  Mild mitral regurgitation    • Mitral annular calcification     12/8/2017- echo, moderate   • PAF (paroxysmal atrial fibrillation) (HCC)    • Peripheral neuropathy    • Skin cancer     Left hand   • Sleep apnea     bipap   • SSS (sick sinus syndrome) (HCC)    • Stroke (cerebrum) (HCC)    • Tricuspid regurgitation     Trace       ONCOLOGIC HISTORY:    [facility] HISTORY  Patient is an 85-year-old female whom I had seen in 2014 when she was hospitalized at [facility] and was diagnosed with chronic lymphocytic leukemia.  She has not been to see me in over 4 years and is following with Dr. Suarez her primary care doctor and her white count is gone up a little higher than usual to 22,000 and she is referred back to us for evaluation.  We are doing a telephone visit because of the coronavirus pandemic and her age and susceptibility.    When I originally saw her in 2014 she was brought into the ER unresponsive in septic shock on 03/25/2014 with methicillin-resistant staphylococcus identified in her blood cultures. The patient has been on antibiotic with improvement, but had some residual kidney damage with a creatinine in the 4-range requiring hemodialysis, and was noted on admission to have an elevated white count with lymphocytosis, normal hemoglobin, but a platelet count of 95,000, and over the course of the illness her platelet count normalized and the white count had gone up into the 20,000 range with lymphocytosis. A flow cytometry was sent which is consistent with CLL, she came to our office once or twice and then stopped coming because she was so stable     She tells me now she was hospitalized recently with rhinovirus infection and has had diagnosed with asthma and is having a lot of respiratory issues but does not require oxygen.She is at the [facility] home in independent living and managing fairly well    She denies fever sweats or weight loss.  Her last white count was on 5/26/2020  Showed a white count of 18,000 with 66  lymphs and 27 neutrophils platelet white hemoglobin is 12.6 platelet 188,000    I reassured Christopher that no treatment is indicated for this level of leukocytosis from CLL and if she has no systemic complaints I do not feel strongly about doing CAT scans at her age but I would like to physically examine her in the office in a couple of months to make sure there is no obvious adenopathy or hepatosplenomegaly.    She does have recurrent infections and we can check quantitative immunoglobulins to see how low they are as another adjunctive option to prevent recurrent infections if she has hypogammaglobulinemia    She remains on Coumadin for atrial fibrillation      Current Outpatient Medications on File Prior to Visit   Medication Sig Dispense Refill   • acetaminophen (TYLENOL) 325 MG tablet Take 2 tablets by mouth Every 4 (Four) Hours As Needed for Mild Pain .     • acetylcysteine (MUCOMYST) 20 % nebulizer solution Take 2 mL by nebulization 4 (Four) Times a Day.     • albuterol (PROVENTIL) (2.5 MG/3ML) 0.083% nebulizer solution Take 2.5 mg by nebulization Every 4 (Four) Hours.     • albuterol sulfate  (90 Base) MCG/ACT inhaler Inhale 2 puffs Every 4 (Four) Hours As Needed for Wheezing. 1 inhaler 3   • AMOXICILLIN PO Take  by mouth. DENTAL PROCEDURES/CLEANINGS     • Benralizumab (FASENRA SC) Inject  under the skin into the appropriate area as directed. Gets one shot a month, next shot may 13 then one every 8 weeks     • Calcium Carbonate-Vitamin D (calcium-vitamin D) 500-200 MG-UNIT tablet per tablet Take 1 tablet by mouth.     • carvedilol (COREG) 3.125 MG tablet TAKE ONE TABLET BY MOUTH TWICE A  tablet 2   • cephalexin (KEFLEX) 500 MG capsule Take 500 mg by mouth 3 (Three) Times a Day.     • Cetirizine HCl 10 MG capsule Take  by mouth.     • Diclofenac Sodium (VOLTAREN) 1 % gel gel Apply 4 g topically to the appropriate area as directed 2 (Two) Times a Day. 100 g 0   • fluticasone (FLONASE) 50 MCG/ACT nasal  spray 1 spray into the nostril(s) as directed by provider Daily.     • Fluticasone-Umeclidin-Vilant (TRELEGY) 100-62.5-25 MCG/INH inhaler Inhale 1 puff Daily. As directed     • furosemide (LASIX) 20 MG tablet Take 1 tablet by mouth As Needed (For weight gain of greater than 2 pounds in 1 day or 5 pounds in 1 week). 30 tablet 11   • glipizide (GLUCOTROL) 5 MG tablet Take 5 mg by mouth. Take one half tablet by mouth daily     • glipizide (GLUCOTROL) 5 MG tablet Take 1 tablet by mouth 2 (Two) Times a Day Before Meals.     • guaiFENesin (MUCINEX) 600 MG 12 hr tablet Take 1 tablet by mouth Every 12 (Twelve) Hours.     • ketoconazole (NIZORAL) 2 % cream Apply 1 application topically to the appropriate area as directed 2 (Two) Times a Day.     • Loperamide HCl (IMODIUM PO) Take  by mouth Daily.     • LORazepam (ATIVAN) 0.5 MG tablet      • Melatonin 5 MG capsule Take  by mouth.     • melatonin 5 MG tablet tablet Take 5 mg by mouth Every Night.     • metFORMIN ER (GLUCOPHAGE-XR) 500 MG 24 hr tablet      • montelukast (SINGULAIR) 10 MG tablet Take 10 mg by mouth Every Night.     • mupirocin (BACTROBAN) 2 % ointment      • nystatin (MYCOSTATIN) 303646 UNIT/GM cream      • OXYBUTYNIN CHLORIDE PO Take 20 mg by mouth Daily.     • oxybutynin XL (DITROPAN-XL) 10 MG 24 hr tablet Take 10 mg by mouth every night at bedtime.     • polyethyl glycol-propyl glycol (SYSTANE) 0.4-0.3 % solution ophthalmic solution (artificial tears) Administer 1 drop to both eyes Every 1 (One) Hour As Needed.     • rosuvastatin (CRESTOR) 20 MG tablet Take 20 mg by mouth Every Night.     • Tobramycin 300 MG/4ML nebulizer solution      • warfarin (COUMADIN) 4 MG tablet TAKE 1/2 TABLET (2mg) on Tuesday AND Friday and TAKE ONE TABLET (4mg) ALL OTHER DAYS OR AS DIRECTED. 85 tablet 1     No current facility-administered medications on file prior to visit.       ALLERGIES:     Allergies   Allergen Reactions   • Accupril [Quinapril Hcl] Swelling, Other (See  Comments), GI Intolerance and Delirium     HA, constipation    • Ahist [Chlorpheniramine] Nausea Only, Other (See Comments) and Dizziness     Headache, Blurred vision   • Clarithromycin Nausea Only, Other (See Comments) and Mental Status Change     HA, Depression, Flushing   • Esomeprazole GI Intolerance   • Levalbuterol Swelling   • Levocetirizine Diarrhea and GI Intolerance   • Lipitor [Atorvastatin] Other (See Comments) and Myalgia     Dark urine   • Omeprazole Nausea Only and Other (See Comments)     HA   • Pravachol [Pravastatin] Nausea Only and GI Intolerance     Bloated, Constipation, HA   • Sulindac Other (See Comments) and Myalgia     HA, joint pain, bruising   • Valdecoxib Irritability   • Chlorcyclizine Unknown - Low Severity   • Diclofenac Sodium Unknown - Low Severity   • Diphenhydramine Unknown - Low Severity   • Lodine [Etodolac] Unknown - Low Severity   • Sulfa Antibiotics Unknown - Low Severity       Social History     Socioeconomic History   • Marital status: Single   Tobacco Use   • Smoking status: Never Smoker   • Smokeless tobacco: Never Used   • Tobacco comment: caffeine use- soda   Vaping Use   • Vaping Use: Never used   Substance and Sexual Activity   • Alcohol use: Never   • Drug use: No   • Sexual activity: Defer         Cancer-related family history includes Colon cancer in her sister.     I have reviewed the patient's medical history in detail and updated the computerized patient record.    Review of Systems   Constitutional: Negative for appetite change, chills, diaphoresis, fatigue, fever and unexpected weight change.   HENT: Negative for mouth sores and sore throat.    Eyes: Negative for visual disturbance.   Respiratory: Positive for shortness of breath (stable). Negative for cough and wheezing.    Gastrointestinal: Negative.  Negative for abdominal pain, diarrhea, nausea and vomiting.   Genitourinary: Negative.  Negative for dysuria and frequency.   Musculoskeletal: Positive for back  pain.   Neurological: Negative.  Negative for dizziness and weakness.   Hematological: Does not bruise/bleed easily.   Psychiatric/Behavioral: Negative.  The patient is not nervous/anxious.    All other systems reviewed and are negative.  I have reviewed and confirmed the accuracy of the ROS as documented by the MA/LPN/RN Lucien Larkin MD      Objective      Vitals:    10/04/22 0905   BP: 103/65   Pulse: 92   Resp: 18   Temp: 96.4 °F (35.8 °C)   SpO2: 94%     Current Status 3/1/2022   ECOG score 1     Pain Score    10/04/22 0905   PainSc: 0-No pain       CONSTITUTIONAL:  Vital signs reviewed.  No distress, looks comfortable.  EYES:  Conjunctiva and lids unremarkable.  PERRLA  EARS,NOSE,MOUTH,THROAT: Hearing intact.  Lips, teeth, gums appear unremarkable.  RESPIRATORY:  Normal respiratory effort.  Lungs clear to auscultation on the right decreased breath breath sounds lower half of the left lung  CARDIOVASCULAR:  Normal S1, S2.  No murmurs rubs or gallops.  No significant lower extremity edema.  GASTROINTESTINAL: Abdomen appears unremarkable.  Nontender.  No hepatomegaly.  No splenomegaly.  LYMPHATIC:  No cervical, supraclavicular lymphadenopathy.  SKIN:  Warm.  No rashes.  Numerous ecchymosis on her arms and legs  PSYCHIATRIC:  Normal judgment and insight.  Normal mood and affect.   I have reexamined the patient 10/04/2022 and the results are consistent with the previously documented exam. Lucien Larkin MD       RECENT LABS:  Results from last 7 days   Lab Units 10/04/22  0851   WBC 10*3/mm3 11.87*   NEUTROS ABS 10*3/mm3 2.87   HEMOGLOBIN g/dL 12.9   HEMATOCRIT % 40.1   PLATELETS 10*3/mm3 129*       IgG 9084, IgM 30, IgA 15.  Results from last 7 days   Lab Units 09/28/22  0000   INR  2.20            Assessment & Plan    1. Chronic lymphocytic leukemia  -stage 0 since 2014 untreated    2.  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  The  patient received IVIG treatments October 2020 through March 2021.  We have held IVIG since that time and the patient is happy to report no infections aside from some urinary tract infection which she has chronically.  Otherwise she has been feeling very well.  · Resume IV IgG from October through March 2022 monthly  · Resume IVIG from October through March 2023 monthly    3.  COPD/asthma /emphysema on home O2-recurrent respiratory infections with RSV and rhinovirus and bacteria    4.  Atrial fibrillation on Coumadin    5.  Hypertension/hypercholesterolemia/type 2 diabetes on treatment    6.  Vaccinated against coronavirus    7.  Mild anemia-continued to observe for now    Plan  1.  Proceed with IVIG today and continue monthly  2. See me in 3 mo       Lucien Larkin MD

## 2022-10-12 ENCOUNTER — ANTICOAGULATION VISIT (OUTPATIENT)
Dept: PHARMACY | Facility: HOSPITAL | Age: 87
End: 2022-10-12

## 2022-10-12 DIAGNOSIS — I48.0 PAROXYSMAL ATRIAL FIBRILLATION: Primary | ICD-10-CM

## 2022-10-12 LAB — INR PPP: 2.4

## 2022-10-12 NOTE — PROGRESS NOTES
Anticoagulation Clinic Progress Note    Anticoagulation Summary  As of 10/12/2022    INR goal:  2.0-3.0   TTR:  66.2 % (3.8 y)   INR used for dosin.40 (10/12/2022)   Warfarin maintenance plan:  2 mg every Tue, Fri; 4 mg all other days   Weekly warfarin total:  24 mg   No change documented:  Petra Oliveira RPH   Plan last modified:  Alexander Valiente, PharmD (2022)   Next INR check:  2022   Priority:  Maintenance   Target end date:  Indefinite    Indications    Paroxysmal atrial fibrillation (HCC) [I48.0]             Anticoagulation Episode Summary     INR check location:      Preferred lab:      Send INR reminders to:   [facility]    Comments:  [facility] Home lab beginning 20      Anticoagulation Care Providers     Provider Role Specialty Phone number    oJnah Regalado Jr., MD Referring Cardiology [phone]          Clinic Interview:  Patient Findings     Negatives:  Signs/symptoms of thrombosis, Signs/symptoms of bleeding,   Laboratory test error suspected, Change in health, Change in alcohol use,   Change in activity, Upcoming invasive procedure, Emergency department   visit, Upcoming dental procedure, Missed doses, Extra doses, Change in   medications, Change in diet/appetite, Hospital admission, Bruising, Other   complaints      Clinical Outcomes     Negatives:  Major bleeding event, Thromboembolic event,   Anticoagulation-related hospital admission, Anticoagulation-related ED   visit, Anticoagulation-related fatality        INR History:  Anticoagulation Monitoring 2022 2022 10/12/2022   INR 1.80 2.20 2.40   INR Date 2022 2022 10/12/2022   INR Goal 2.0-3.0 2.0-3.0 2.0-3.0   Trend Same Same Same   Last Week Total 24 mg 24 mg 24 mg   Next Week Total 28 mg 24 mg 24 mg   Sun Hold (); Otherwise 4 mg 4 mg 4 mg   Mon Hold (); Otherwise 4 mg 4 mg 4 mg   Tue Hold (); Otherwise 2 mg 2 mg 2 mg   Wed 6 mg (); Otherwise 4 mg 4 mg 4 mg   Thu 6 mg (9/15);  Otherwise 4 mg 4 mg 4 mg   Fri 6 mg (9/2); Otherwise 2 mg 2 mg 2 mg   Sat Hold (9/10); Otherwise 4 mg 4 mg 4 mg   Visit Report - - -   Some recent data might be hidden       Plan:  1. INR is Therapeutic today- see above in Anticoagulation Summary.   Will instruct Caitlyn GARRETT Longoria to Continue their warfarin regimen- see above in Anticoagulation Summary.  2. Follow up in 4 weeks  3. They have been instructed to call if any changes in medications, doses, concerns, etc. Patient expresses understanding and has no further questions at this time.    Petra Oliveira, [facility]

## 2022-11-02 ENCOUNTER — LAB (OUTPATIENT)
Dept: LAB | Facility: HOSPITAL | Age: 87
End: 2022-11-02

## 2022-11-02 ENCOUNTER — INFUSION (OUTPATIENT)
Dept: ONCOLOGY | Facility: HOSPITAL | Age: 87
End: 2022-11-02

## 2022-11-02 VITALS
SYSTOLIC BLOOD PRESSURE: 102 MMHG | BODY MASS INDEX: 35.19 KG/M2 | WEIGHT: 198.6 LBS | HEART RATE: 80 BPM | OXYGEN SATURATION: 96 % | DIASTOLIC BLOOD PRESSURE: 65 MMHG | RESPIRATION RATE: 18 BRPM | TEMPERATURE: 97.3 F

## 2022-11-02 DIAGNOSIS — C91.12 CLL (CHRONIC LYMPHOID LEUKEMIA) IN RELAPSE: Primary | ICD-10-CM

## 2022-11-02 LAB
BASOPHILS # BLD AUTO: 0.01 10*3/MM3 (ref 0–0.2)
BASOPHILS NFR BLD AUTO: 0.1 % (ref 0–1.5)
DEPRECATED RDW RBC AUTO: 57.1 FL (ref 37–54)
EOSINOPHIL # BLD AUTO: 0 10*3/MM3 (ref 0–0.4)
EOSINOPHIL NFR BLD AUTO: 0 % (ref 0.3–6.2)
ERYTHROCYTE [DISTWIDTH] IN BLOOD BY AUTOMATED COUNT: 15.5 % (ref 12.3–15.4)
HCT VFR BLD AUTO: 38.4 % (ref 34–46.6)
HGB BLD-MCNC: 12.4 G/DL (ref 12–15.9)
IGA1 MFR SER: <50 MG/DL (ref 70–400)
IGG1 SER-MCNC: 830 MG/DL (ref 700–1600)
IGM SERPL-MCNC: 26 MG/DL (ref 40–230)
IMM GRANULOCYTES # BLD AUTO: 0.02 10*3/MM3 (ref 0–0.05)
IMM GRANULOCYTES NFR BLD AUTO: 0.2 % (ref 0–0.5)
LYMPHOCYTES # BLD AUTO: 7.39 10*3/MM3 (ref 0.7–3.1)
LYMPHOCYTES NFR BLD AUTO: 66.5 % (ref 19.6–45.3)
MCH RBC QN AUTO: 32 PG (ref 26.6–33)
MCHC RBC AUTO-ENTMCNC: 32.3 G/DL (ref 31.5–35.7)
MCV RBC AUTO: 99 FL (ref 79–97)
MONOCYTES # BLD AUTO: 0.16 10*3/MM3 (ref 0.1–0.9)
MONOCYTES NFR BLD AUTO: 1.4 % (ref 5–12)
NEUTROPHILS NFR BLD AUTO: 3.54 10*3/MM3 (ref 1.7–7)
NEUTROPHILS NFR BLD AUTO: 31.8 % (ref 42.7–76)
NRBC BLD AUTO-RTO: 0 /100 WBC (ref 0–0.2)
PLATELET # BLD AUTO: 131 10*3/MM3 (ref 140–450)
PMV BLD AUTO: 9.3 FL (ref 6–12)
RBC # BLD AUTO: 3.88 10*6/MM3 (ref 3.77–5.28)
WBC NRBC COR # BLD: 11.12 10*3/MM3 (ref 3.4–10.8)

## 2022-11-02 PROCEDURE — 85025 COMPLETE CBC W/AUTO DIFF WBC: CPT

## 2022-11-02 PROCEDURE — 96365 THER/PROPH/DIAG IV INF INIT: CPT

## 2022-11-02 PROCEDURE — 25010000002 IMMUNE GLOBULIN (HUMAN) 30 GM/300ML SOLUTION: Performed by: INTERNAL MEDICINE

## 2022-11-02 PROCEDURE — 82784 ASSAY IGA/IGD/IGG/IGM EACH: CPT | Performed by: INTERNAL MEDICINE

## 2022-11-02 PROCEDURE — 96366 THER/PROPH/DIAG IV INF ADDON: CPT

## 2022-11-02 RX ORDER — SODIUM CHLORIDE 9 MG/ML
250 INJECTION, SOLUTION INTRAVENOUS ONCE
Status: COMPLETED | OUTPATIENT
Start: 2022-11-02 | End: 2022-11-02

## 2022-11-02 RX ORDER — ACETAMINOPHEN 325 MG/1
650 TABLET ORAL ONCE
Status: COMPLETED | OUTPATIENT
Start: 2022-11-02 | End: 2022-11-02

## 2022-11-02 RX ORDER — HYDROXYZINE PAMOATE 25 MG/1
25 CAPSULE ORAL ONCE
Status: COMPLETED | OUTPATIENT
Start: 2022-11-02 | End: 2022-11-02

## 2022-11-02 RX ADMIN — SODIUM CHLORIDE 250 ML: 9 INJECTION, SOLUTION INTRAVENOUS at 09:59

## 2022-11-02 RX ADMIN — IMMUNE GLOBULIN INFUSION (HUMAN) 30 G: 100 INJECTION, SOLUTION INTRAVENOUS; SUBCUTANEOUS at 09:59

## 2022-11-02 RX ADMIN — HYDROXYZINE PAMOATE 25 MG: 25 CAPSULE ORAL at 09:26

## 2022-11-02 RX ADMIN — ACETAMINOPHEN 650 MG: 325 TABLET, FILM COATED ORAL at 09:26

## 2022-11-09 ENCOUNTER — ANTICOAGULATION VISIT (OUTPATIENT)
Dept: PHARMACY | Facility: HOSPITAL | Age: 87
End: 2022-11-09

## 2022-11-09 DIAGNOSIS — I48.0 PAROXYSMAL ATRIAL FIBRILLATION: Primary | ICD-10-CM

## 2022-11-09 LAB — INR PPP: 2.2

## 2022-11-10 NOTE — PROGRESS NOTES
Anticoagulation Clinic Progress Note    Anticoagulation Summary  As of 2022    INR goal:  2.0-3.0   TTR:  66.8 % (3.9 y)   INR used for dosin.20 (2022)   Warfarin maintenance plan:  2 mg every Tue, Fri; 4 mg all other days; Starting 2022   Weekly warfarin total:  24 mg   No change documented:  Petra Oliveira RPH   Plan last modified:  Alexander Valiente, PharmD (2022)   Next INR check:  2022   Priority:  Maintenance   Target end date:  Indefinite    Indications    Paroxysmal atrial fibrillation (HCC) [I48.0]             Anticoagulation Episode Summary     INR check location:      Preferred lab:      Send INR reminders to:   [facility] [place]    Comments:  [facility] Home lab beginning 20      Anticoagulation Care Providers     Provider Role Specialty Phone number    Jonah Regalado Jr., MD Referring Cardiology [phone]          Clinic Interview:  Patient Findings     Negatives:  Signs/symptoms of thrombosis, Signs/symptoms of bleeding,   Laboratory test error suspected, Change in health, Change in alcohol use,   Change in activity, Upcoming invasive procedure, Emergency department   visit, Upcoming dental procedure, Missed doses, Extra doses, Change in   medications, Change in diet/appetite, Hospital admission, Bruising, Other   complaints      Clinical Outcomes     Negatives:  Major bleeding event, Thromboembolic event,   Anticoagulation-related hospital admission, Anticoagulation-related ED   visit, Anticoagulation-related fatality        INR History:  Anticoagulation Monitoring 2022 10/12/2022 2022 2022   INR 2.20 2.40 2.20 -   INR Date 2022 10/12/2022 2022 -   INR Goal 2.0-3.0 2.0-3.0 2.0-3.0 2.0-3.0   Trend Same Same Same -   Last Week Total 24 mg 24 mg 24 mg -   Next Week Total 24 mg 24 mg 24 mg -   Sun 4 mg 4 mg 4 mg -   Mon 4 mg 4 mg 4 mg -   Tue 2 mg 2 mg 2 mg -   Wed 4 mg 4 mg 4 mg -   Thu 4 mg 4 mg 4 mg -   Fri 2 mg 2 mg 2 mg -   Sat 4 mg 4  mg 4 mg -   Visit Report - - - -   Some recent data might be hidden       Plan:  1. INR is Therapeutic today- see above in Anticoagulation Summary.   Will instruct Caitlyn GARRETT Longoria to Continue their warfarin regimen- see above in Anticoagulation Summary.  2. Follow up in 4 weeks  3. LVM with instructions per request from patient. They have been instructed to call if any changes in medications, doses, concerns, etc. Patient expresses understanding and has no further questions at this time.    Petra Oliveira, [facility]

## 2022-11-30 ENCOUNTER — LAB (OUTPATIENT)
Dept: LAB | Facility: HOSPITAL | Age: 87
End: 2022-11-30

## 2022-11-30 ENCOUNTER — INFUSION (OUTPATIENT)
Dept: ONCOLOGY | Facility: HOSPITAL | Age: 87
End: 2022-11-30

## 2022-11-30 VITALS
DIASTOLIC BLOOD PRESSURE: 70 MMHG | SYSTOLIC BLOOD PRESSURE: 122 MMHG | RESPIRATION RATE: 16 BRPM | WEIGHT: 197 LBS | BODY MASS INDEX: 34.91 KG/M2 | HEART RATE: 95 BPM | TEMPERATURE: 97.5 F | OXYGEN SATURATION: 96 %

## 2022-11-30 DIAGNOSIS — C91.12 CLL (CHRONIC LYMPHOID LEUKEMIA) IN RELAPSE: ICD-10-CM

## 2022-11-30 DIAGNOSIS — C91.12 CLL (CHRONIC LYMPHOID LEUKEMIA) IN RELAPSE: Primary | ICD-10-CM

## 2022-11-30 LAB
BASOPHILS # BLD AUTO: 0.02 10*3/MM3 (ref 0–0.2)
BASOPHILS NFR BLD AUTO: 0.2 % (ref 0–1.5)
DEPRECATED RDW RBC AUTO: 55.8 FL (ref 37–54)
EOSINOPHIL # BLD AUTO: 0 10*3/MM3 (ref 0–0.4)
EOSINOPHIL NFR BLD AUTO: 0 % (ref 0.3–6.2)
ERYTHROCYTE [DISTWIDTH] IN BLOOD BY AUTOMATED COUNT: 15 % (ref 12.3–15.4)
HCT VFR BLD AUTO: 39.7 % (ref 34–46.6)
HGB BLD-MCNC: 12.6 G/DL (ref 12–15.9)
IGA1 MFR SER: <50 MG/DL (ref 70–400)
IGG1 SER-MCNC: 915 MG/DL (ref 700–1600)
IGM SERPL-MCNC: 30 MG/DL (ref 40–230)
IMM GRANULOCYTES # BLD AUTO: 0.02 10*3/MM3 (ref 0–0.05)
IMM GRANULOCYTES NFR BLD AUTO: 0.2 % (ref 0–0.5)
LYMPHOCYTES # BLD AUTO: 8.1 10*3/MM3 (ref 0.7–3.1)
LYMPHOCYTES NFR BLD AUTO: 63.7 % (ref 19.6–45.3)
MCH RBC QN AUTO: 31.7 PG (ref 26.6–33)
MCHC RBC AUTO-ENTMCNC: 31.7 G/DL (ref 31.5–35.7)
MCV RBC AUTO: 100 FL (ref 79–97)
MONOCYTES # BLD AUTO: 0.23 10*3/MM3 (ref 0.1–0.9)
MONOCYTES NFR BLD AUTO: 1.8 % (ref 5–12)
NEUTROPHILS NFR BLD AUTO: 34.1 % (ref 42.7–76)
NEUTROPHILS NFR BLD AUTO: 4.35 10*3/MM3 (ref 1.7–7)
NRBC BLD AUTO-RTO: 0 /100 WBC (ref 0–0.2)
PLATELET # BLD AUTO: 150 10*3/MM3 (ref 140–450)
PMV BLD AUTO: 9.5 FL (ref 6–12)
RBC # BLD AUTO: 3.97 10*6/MM3 (ref 3.77–5.28)
WBC NRBC COR # BLD: 12.72 10*3/MM3 (ref 3.4–10.8)

## 2022-11-30 PROCEDURE — 36415 COLL VENOUS BLD VENIPUNCTURE: CPT

## 2022-11-30 PROCEDURE — 96366 THER/PROPH/DIAG IV INF ADDON: CPT

## 2022-11-30 PROCEDURE — 25010000002 IMMUNE GLOBULIN (HUMAN) 30 GM/300ML SOLUTION: Performed by: INTERNAL MEDICINE

## 2022-11-30 PROCEDURE — 96365 THER/PROPH/DIAG IV INF INIT: CPT

## 2022-11-30 PROCEDURE — 85025 COMPLETE CBC W/AUTO DIFF WBC: CPT

## 2022-11-30 PROCEDURE — 82784 ASSAY IGA/IGD/IGG/IGM EACH: CPT | Performed by: INTERNAL MEDICINE

## 2022-11-30 RX ORDER — ACETAMINOPHEN 325 MG/1
650 TABLET ORAL ONCE
Status: COMPLETED | OUTPATIENT
Start: 2022-11-30 | End: 2022-11-30

## 2022-11-30 RX ORDER — SODIUM CHLORIDE 9 MG/ML
250 INJECTION, SOLUTION INTRAVENOUS ONCE
Status: COMPLETED | OUTPATIENT
Start: 2022-11-30 | End: 2022-11-30

## 2022-11-30 RX ORDER — HYDROXYZINE PAMOATE 25 MG/1
25 CAPSULE ORAL ONCE
Status: COMPLETED | OUTPATIENT
Start: 2022-11-30 | End: 2022-11-30

## 2022-11-30 RX ADMIN — HYDROXYZINE PAMOATE 25 MG: 25 CAPSULE ORAL at 09:46

## 2022-11-30 RX ADMIN — SODIUM CHLORIDE 250 ML: 9 INJECTION, SOLUTION INTRAVENOUS at 10:22

## 2022-11-30 RX ADMIN — IMMUNE GLOBULIN INFUSION (HUMAN) 30 G: 100 INJECTION, SOLUTION INTRAVENOUS; SUBCUTANEOUS at 10:22

## 2022-11-30 RX ADMIN — ACETAMINOPHEN 650 MG: 325 TABLET, FILM COATED ORAL at 09:46

## 2022-12-04 DIAGNOSIS — I48.92 ATRIAL FIBRILLATION AND FLUTTER: ICD-10-CM

## 2022-12-04 DIAGNOSIS — I48.91 ATRIAL FIBRILLATION AND FLUTTER: ICD-10-CM

## 2022-12-05 RX ORDER — CARVEDILOL 3.12 MG/1
TABLET ORAL
Qty: 180 TABLET | Refills: 2 | Status: SHIPPED | OUTPATIENT
Start: 2022-12-05 | End: 2023-01-09 | Stop reason: SDUPTHER

## 2022-12-07 ENCOUNTER — ANTICOAGULATION VISIT (OUTPATIENT)
Dept: PHARMACY | Facility: HOSPITAL | Age: 87
End: 2022-12-07

## 2022-12-07 DIAGNOSIS — I48.0 PAROXYSMAL ATRIAL FIBRILLATION: Primary | ICD-10-CM

## 2022-12-07 LAB — INR PPP: 2.1

## 2022-12-07 NOTE — PROGRESS NOTES
Anticoagulation Clinic Progress Note    Anticoagulation Summary  As of 2022    INR goal:  2.0-3.0   TTR:  67.5 % (4 y)   INR used for dosin.10 (2022)   Warfarin maintenance plan:  2 mg every Tue, Fri; 4 mg all other days; Starting 2022   Weekly warfarin total:  24 mg   No change documented:  Petra Oliveira RPH   Plan last modified:  Alexander Valiente, PharmD (2022)   Next INR check:  2023   Priority:  Maintenance   Target end date:  Indefinite    Indications    Paroxysmal atrial fibrillation (HCC) [I48.0]             Anticoagulation Episode Summary     INR check location:      Preferred lab:      Send INR reminders to:   [facility] [place]    Comments:  [facility] Home lab beginning 20      Anticoagulation Care Providers     Provider Role Specialty Phone number    Jonah Rgealado Jr., MD Referring Cardiology [phone]          Clinic Interview: No clinical findings       INR History:  Anticoagulation Monitoring 10/12/2022 2022 2022 2022   INR 2.40 2.20 - 2.10   INR Date 10/12/2022 2022 - 2022   INR Goal 2.0-3.0 2.0-3.0 2.0-3.0 2.0-3.0   Trend Same Same - Same   Last Week Total 24 mg 24 mg - 24 mg   Next Week Total 24 mg 24 mg - 24 mg   Sun 4 mg 4 mg - 4 mg   Mon 4 mg 4 mg - 4 mg   Tue 2 mg 2 mg - 2 mg   Wed 4 mg 4 mg - 4 mg   Thu 4 mg 4 mg - 4 mg   Fri 2 mg 2 mg - 2 mg   Sat 4 mg 4 mg - 4 mg   Visit Report - - - -   Some recent data might be hidden       Plan:  1. INR is Therapeutic today- see above in Anticoagulation Summary.   Will instruct Caitlyn GARRETT Longoria to Continue their warfarin regimen- see above in Anticoagulation Summary.  2. Follow up in 4 weeks  3. They have been instructed to call if any changes in medications, doses, concerns, etc. Patient expresses understanding and has no further questions at this time.    Petra Oliveira RPH

## 2022-12-11 PROCEDURE — 93294 REM INTERROG EVL PM/LDLS PM: CPT | Performed by: INTERNAL MEDICINE

## 2022-12-11 PROCEDURE — 93296 REM INTERROG EVL PM/IDS: CPT | Performed by: INTERNAL MEDICINE

## 2022-12-21 ENCOUNTER — ANTICOAGULATION VISIT (OUTPATIENT)
Dept: PHARMACY | Facility: HOSPITAL | Age: 87
End: 2022-12-21

## 2022-12-21 DIAGNOSIS — I48.0 PAROXYSMAL ATRIAL FIBRILLATION: Primary | ICD-10-CM

## 2022-12-21 LAB — INR PPP: 3

## 2022-12-21 NOTE — PROGRESS NOTES
Anticoagulation Clinic Progress Note    Anticoagulation Summary  As of 12/21/2022    INR goal:  2.0-3.0   TTR:  67.8 % (4 y)   INR used for dosing:  3.00 (12/21/2022)   Warfarin maintenance plan:  2 mg every Tue, Fri; 4 mg all other days; Starting 12/21/2022   Weekly warfarin total:  24 mg   No change documented:  Petra Oliveira RPH   Plan last modified:  Alexander Valiente, PharmD (9/2/2022)   Next INR check:  1/18/2023   Priority:  Maintenance   Target end date:  Indefinite    Indications    Paroxysmal atrial fibrillation (HCC) [I48.0]             Anticoagulation Episode Summary     INR check location:      Preferred lab:      Send INR reminders to:   [facility] [place]    Comments:  [facility] lab beginning 4/22/20      Anticoagulation Care Providers     Provider Role Specialty Phone number    Jonah Regalado Jr., MD Referring Cardiology [phone]          Clinic Interview:  Patient Findings     Positives:  Change in medications    Negatives:  Signs/symptoms of thrombosis, Signs/symptoms of bleeding,   Laboratory test error suspected, Change in health, Change in alcohol use,   Change in activity, Upcoming invasive procedure, Emergency department   visit, Upcoming dental procedure, Missed doses, Extra doses, Change in   diet/appetite, Hospital admission, Bruising, Other complaints    Comments:  URI was taking prednisone until today      Clinical Outcomes     Negatives:  Major bleeding event, Thromboembolic event,   Anticoagulation-related hospital admission, Anticoagulation-related ED   visit, Anticoagulation-related fatality    Comments:  URI was taking prednisone until today        INR History:  Anticoagulation Monitoring 11/9/2022 11/9/2022 12/7/2022 12/21/2022   INR 2.20 - 2.10 3.00   INR Date 11/9/2022 - 12/7/2022 12/21/2022   INR Goal 2.0-3.0 2.0-3.0 2.0-3.0 2.0-3.0   Trend Same - Same Same   Last Week Total 24 mg - 24 mg 24 mg   Next Week Total 24 mg - 24 mg 24 mg   Sun 4 mg - 4 mg 4 mg   Mon 4 mg  - 4 mg 4 mg   Tue 2 mg - 2 mg 2 mg   Wed 4 mg - 4 mg 4 mg   Thu 4 mg - 4 mg 4 mg   Fri 2 mg - 2 mg 2 mg   Sat 4 mg - 4 mg 4 mg   Visit Report - - - -   Some recent data might be hidden       Plan:  1. INR is Therapeutic today- see above in Anticoagulation Summary.   Will instruct Caitlyn GARRETT Longoria to Continue their warfarin regimen- see above in Anticoagulation Summary.  2. Follow up in 4 weeks  3. Pt has agreed to only be called if INR out of range. They have been instructed to call if any changes in medications, doses, concerns, etc. Patient expresses understanding and has no further questions at this time.    Petra Oliveira [facility]

## 2022-12-28 ENCOUNTER — INFUSION (OUTPATIENT)
Dept: ONCOLOGY | Facility: HOSPITAL | Age: 87
End: 2022-12-28
Payer: MEDICARE

## 2022-12-28 ENCOUNTER — OFFICE VISIT (OUTPATIENT)
Dept: ONCOLOGY | Facility: CLINIC | Age: 87
End: 2022-12-28

## 2022-12-28 ENCOUNTER — LAB (OUTPATIENT)
Dept: LAB | Facility: HOSPITAL | Age: 87
End: 2022-12-28
Payer: MEDICARE

## 2022-12-28 VITALS
HEIGHT: 63 IN | RESPIRATION RATE: 18 BRPM | HEART RATE: 83 BPM | SYSTOLIC BLOOD PRESSURE: 94 MMHG | OXYGEN SATURATION: 95 % | BODY MASS INDEX: 33.96 KG/M2 | WEIGHT: 191.7 LBS | DIASTOLIC BLOOD PRESSURE: 64 MMHG | TEMPERATURE: 96.9 F

## 2022-12-28 VITALS — DIASTOLIC BLOOD PRESSURE: 73 MMHG | SYSTOLIC BLOOD PRESSURE: 121 MMHG | HEART RATE: 80 BPM

## 2022-12-28 DIAGNOSIS — C91.12 CLL (CHRONIC LYMPHOID LEUKEMIA) IN RELAPSE: ICD-10-CM

## 2022-12-28 DIAGNOSIS — C91.12 CLL (CHRONIC LYMPHOID LEUKEMIA) IN RELAPSE: Primary | ICD-10-CM

## 2022-12-28 DIAGNOSIS — D80.1 HYPOGAMMAGLOBULINEMIA: ICD-10-CM

## 2022-12-28 LAB
ALBUMIN SERPL-MCNC: 3.8 G/DL (ref 3.5–5.2)
ALBUMIN/GLOB SERPL: 1.3 G/DL (ref 1.1–2.4)
ALP SERPL-CCNC: 92 U/L (ref 38–116)
ALT SERPL W P-5'-P-CCNC: 52 U/L (ref 0–33)
ANION GAP SERPL CALCULATED.3IONS-SCNC: 13 MMOL/L (ref 5–15)
AST SERPL-CCNC: 53 U/L (ref 0–32)
BASOPHILS # BLD AUTO: 0.06 10*3/MM3 (ref 0–0.2)
BASOPHILS NFR BLD AUTO: 0.2 % (ref 0–1.5)
BILIRUB SERPL-MCNC: 1 MG/DL (ref 0.2–1.2)
BUN SERPL-MCNC: 22 MG/DL (ref 6–20)
BUN/CREAT SERPL: 25 (ref 7.3–30)
CALCIUM SPEC-SCNC: 9.1 MG/DL (ref 8.5–10.2)
CHLORIDE SERPL-SCNC: 103 MMOL/L (ref 98–107)
CO2 SERPL-SCNC: 25 MMOL/L (ref 22–29)
CREAT SERPL-MCNC: 0.88 MG/DL (ref 0.6–1.1)
DEPRECATED RDW RBC AUTO: 51.9 FL (ref 37–54)
EGFRCR SERPLBLD CKD-EPI 2021: 63.3 ML/MIN/1.73
EOSINOPHIL # BLD AUTO: 0 10*3/MM3 (ref 0–0.4)
EOSINOPHIL NFR BLD AUTO: 0 % (ref 0.3–6.2)
ERYTHROCYTE [DISTWIDTH] IN BLOOD BY AUTOMATED COUNT: 14.5 % (ref 12.3–15.4)
GLOBULIN UR ELPH-MCNC: 2.9 GM/DL (ref 1.8–3.5)
GLUCOSE SERPL-MCNC: 140 MG/DL (ref 74–124)
HCT VFR BLD AUTO: 41.3 % (ref 34–46.6)
HGB BLD-MCNC: 13.6 G/DL (ref 12–15.9)
IGA1 MFR SER: <50 MG/DL (ref 70–400)
IGG1 SER-MCNC: 917 MG/DL (ref 700–1600)
IGM SERPL-MCNC: 29 MG/DL (ref 40–230)
IMM GRANULOCYTES # BLD AUTO: 0.11 10*3/MM3 (ref 0–0.05)
IMM GRANULOCYTES NFR BLD AUTO: 0.3 % (ref 0–0.5)
LYMPHOCYTES # BLD AUTO: 28.12 10*3/MM3 (ref 0.7–3.1)
LYMPHOCYTES NFR BLD AUTO: 82.1 % (ref 19.6–45.3)
MCH RBC QN AUTO: 32.1 PG (ref 26.6–33)
MCHC RBC AUTO-ENTMCNC: 32.9 G/DL (ref 31.5–35.7)
MCV RBC AUTO: 97.4 FL (ref 79–97)
MONOCYTES # BLD AUTO: 0.2 10*3/MM3 (ref 0.1–0.9)
MONOCYTES NFR BLD AUTO: 0.6 % (ref 5–12)
NEUTROPHILS NFR BLD AUTO: 16.8 % (ref 42.7–76)
NEUTROPHILS NFR BLD AUTO: 5.77 10*3/MM3 (ref 1.7–7)
NRBC BLD AUTO-RTO: 0 /100 WBC (ref 0–0.2)
PLATELET # BLD AUTO: 153 10*3/MM3 (ref 140–450)
PMV BLD AUTO: 10 FL (ref 6–12)
POTASSIUM SERPL-SCNC: 3.7 MMOL/L (ref 3.5–4.7)
PROT SERPL-MCNC: 6.7 G/DL (ref 6.3–8)
RBC # BLD AUTO: 4.24 10*6/MM3 (ref 3.77–5.28)
SODIUM SERPL-SCNC: 141 MMOL/L (ref 134–145)
WBC NRBC COR # BLD: 34.26 10*3/MM3 (ref 3.4–10.8)

## 2022-12-28 PROCEDURE — 96366 THER/PROPH/DIAG IV INF ADDON: CPT

## 2022-12-28 PROCEDURE — 99214 OFFICE O/P EST MOD 30 MIN: CPT | Performed by: INTERNAL MEDICINE

## 2022-12-28 PROCEDURE — 82784 ASSAY IGA/IGD/IGG/IGM EACH: CPT | Performed by: INTERNAL MEDICINE

## 2022-12-28 PROCEDURE — 85025 COMPLETE CBC W/AUTO DIFF WBC: CPT

## 2022-12-28 PROCEDURE — 36415 COLL VENOUS BLD VENIPUNCTURE: CPT

## 2022-12-28 PROCEDURE — 25010000002 IMMUNE GLOBULIN (HUMAN) 30 GM/300ML SOLUTION: Performed by: INTERNAL MEDICINE

## 2022-12-28 PROCEDURE — 80053 COMPREHEN METABOLIC PANEL: CPT

## 2022-12-28 PROCEDURE — 96365 THER/PROPH/DIAG IV INF INIT: CPT

## 2022-12-28 RX ORDER — MEPERIDINE HYDROCHLORIDE 50 MG/ML
25 INJECTION INTRAMUSCULAR; INTRAVENOUS; SUBCUTANEOUS
Status: CANCELLED | OUTPATIENT
Start: 2022-12-28 | End: 2022-12-29

## 2022-12-28 RX ORDER — SODIUM CHLORIDE 9 MG/ML
250 INJECTION, SOLUTION INTRAVENOUS ONCE
Status: COMPLETED | OUTPATIENT
Start: 2022-12-28 | End: 2022-12-28

## 2022-12-28 RX ORDER — FAMOTIDINE 10 MG/ML
20 INJECTION, SOLUTION INTRAVENOUS AS NEEDED
Status: CANCELLED | OUTPATIENT
Start: 2022-12-28

## 2022-12-28 RX ORDER — SODIUM CHLORIDE 9 MG/ML
250 INJECTION, SOLUTION INTRAVENOUS ONCE
Status: CANCELLED | OUTPATIENT
Start: 2022-12-28

## 2022-12-28 RX ORDER — ACETAMINOPHEN 325 MG/1
650 TABLET ORAL ONCE
Status: COMPLETED | OUTPATIENT
Start: 2022-12-28 | End: 2022-12-28

## 2022-12-28 RX ORDER — HYDROXYZINE PAMOATE 25 MG/1
25 CAPSULE ORAL ONCE
Status: COMPLETED | OUTPATIENT
Start: 2022-12-28 | End: 2022-12-28

## 2022-12-28 RX ORDER — HYDROXYZINE PAMOATE 25 MG/1
25 CAPSULE ORAL ONCE
Status: CANCELLED | OUTPATIENT
Start: 2022-12-28

## 2022-12-28 RX ORDER — ACETAMINOPHEN 325 MG/1
650 TABLET ORAL ONCE
Status: CANCELLED | OUTPATIENT
Start: 2022-12-28

## 2022-12-28 RX ORDER — DIPHENHYDRAMINE HYDROCHLORIDE 50 MG/ML
50 INJECTION INTRAMUSCULAR; INTRAVENOUS AS NEEDED
Status: CANCELLED | OUTPATIENT
Start: 2022-12-28

## 2022-12-28 RX ADMIN — IMMUNE GLOBULIN INFUSION (HUMAN) 30 G: 100 INJECTION, SOLUTION INTRAVENOUS; SUBCUTANEOUS at 10:32

## 2022-12-28 RX ADMIN — SODIUM CHLORIDE 250 ML: 9 INJECTION, SOLUTION INTRAVENOUS at 10:09

## 2022-12-28 RX ADMIN — ACETAMINOPHEN 650 MG: 325 TABLET ORAL at 10:06

## 2022-12-28 RX ADMIN — HYDROXYZINE PAMOATE 25 MG: 25 CAPSULE ORAL at 10:06

## 2023-01-04 ENCOUNTER — ANTICOAGULATION VISIT (OUTPATIENT)
Dept: PHARMACY | Facility: HOSPITAL | Age: 88
End: 2023-01-04
Payer: MEDICARE

## 2023-01-04 DIAGNOSIS — I48.0 PAROXYSMAL ATRIAL FIBRILLATION: Primary | ICD-10-CM

## 2023-01-04 LAB — INR PPP: 4

## 2023-01-05 RX ORDER — WARFARIN SODIUM 4 MG/1
TABLET ORAL
Qty: 80 TABLET | Refills: 1 | Status: SHIPPED | OUTPATIENT
Start: 2023-01-05

## 2023-01-05 RX ORDER — CEPHALEXIN 500 MG/1
500 CAPSULE ORAL 3 TIMES DAILY
Qty: 270 CAPSULE | Refills: 3 | Status: SHIPPED | OUTPATIENT
Start: 2023-01-05 | End: 2023-04-05

## 2023-01-05 NOTE — PROGRESS NOTES
Anticoagulation Clinic Progress Note    Anticoagulation Summary  As of 2023    INR goal:  2.0-3.0   TTR:  67.2 % (4.1 y)   INR used for dosin.00 (2023)   Warfarin maintenance plan:  2 mg every Tue, Fri; 4 mg all other days; Starting 2023   Weekly warfarin total:  24 mg   Plan last modified:  Alexander Valiente, PharmD (2022)   Next INR check:  2023   Priority:  Maintenance   Target end date:  Indefinite    Indications    Paroxysmal atrial fibrillation (HCC) [I48.0]             Anticoagulation Episode Summary     INR check location:      Preferred lab:      [facility]:   [facility]    Comments:  [facility] beginning 20      Anticoagulation Care Providers     Provider Role Specialty Phone number    Jonah Regalado Jr., MD Referring Cardiology [phone]          Clinic Interview:  Patient Findings     Positives:  Change in medications    Negatives:  Signs/symptoms of thrombosis, Signs/symptoms of bleeding,   Laboratory test error suspected, Change in health, Change in alcohol use,   Change in activity, Upcoming invasive procedure, Emergency department   visit, Upcoming dental procedure, Missed doses, Extra doses, Change in   diet/appetite, Hospital admission, Bruising, Other complaints    Comments:  prednisone started , has 3 more days left      Clinical Outcomes     Negatives:  Major bleeding event, Thromboembolic event,   Anticoagulation-related hospital admission, Anticoagulation-related ED   visit, Anticoagulation-related fatality    Comments:  prednisone started , has 3 more days left        INR History:  Anticoagulation Monitoring 2022   INR 2.10 3.00 4.00   INR Date 2022   INR Goal 2.0-3.0 2.0-3.0 2.0-3.0   Trend Same Same Same   Last Week Total 24 mg 24 mg 24 mg   Next Week Total 24 mg 24 mg 18 mg   Sun 4 mg 4 mg 4 mg   Mon 4 mg 4 mg 4 mg   Tue 2 mg 2 mg 2 mg   Wed 4 mg 4 mg Hold ();  Otherwise 4 mg   Thu 4 mg 4 mg 2 mg (1/5); Otherwise 4 mg   Fri 2 mg 2 mg 2 mg   Sat 4 mg 4 mg 4 mg   Visit Report - - -   Some recent data might be hidden       Plan:  1. INR is Supratherapeutic today- see above in Anticoagulation Summary.   Will instruct Caitlyn GARRETT Longoria to Change their warfarin regimen- see above in Anticoagulation Summary.  2. Follow up in 2 weeks  3. They have been instructed to call if any changes in medications, doses, concerns, etc. Patient expresses understanding and has no further questions at this time.    Petra Oliveira, [facility]

## 2023-01-09 DIAGNOSIS — I48.92 ATRIAL FIBRILLATION AND FLUTTER: ICD-10-CM

## 2023-01-09 DIAGNOSIS — I48.91 ATRIAL FIBRILLATION AND FLUTTER: ICD-10-CM

## 2023-01-10 RX ORDER — CARVEDILOL 3.12 MG/1
3.12 TABLET ORAL 2 TIMES DAILY
Qty: 180 TABLET | Refills: 2 | Status: SHIPPED | OUTPATIENT
Start: 2023-01-10

## 2023-01-18 ENCOUNTER — ANTICOAGULATION VISIT (OUTPATIENT)
Dept: PHARMACY | Facility: HOSPITAL | Age: 88
End: 2023-01-18
Payer: MEDICARE

## 2023-01-18 ENCOUNTER — CLINICAL SUPPORT NO REQUIREMENTS (OUTPATIENT)
Dept: CARDIOLOGY | Facility: CLINIC | Age: 88
End: 2023-01-18
Payer: MEDICARE

## 2023-01-18 DIAGNOSIS — I49.5 SSS (SICK SINUS SYNDROME): Primary | ICD-10-CM

## 2023-01-18 DIAGNOSIS — I48.0 PAROXYSMAL ATRIAL FIBRILLATION: Primary | ICD-10-CM

## 2023-01-18 LAB — INR PPP: 3.4

## 2023-01-18 PROCEDURE — 93280 PM DEVICE PROGR EVAL DUAL: CPT | Performed by: INTERNAL MEDICINE

## 2023-01-18 NOTE — PROGRESS NOTES
Anticoagulation Clinic Progress Note    Anticoagulation Summary  As of 1/18/2023    INR goal:  2.0-3.0   TTR:  66.5 % (4.1 y)   INR used for dosing:  3.40 (1/18/2023)   Warfarin maintenance plan:  2 mg every Sun, Tue, Fri; 4 mg all other days; Starting 1/18/2023   Weekly warfarin total:  22 mg   Plan last modified:  Petra Oliveira RPH (1/18/2023)   Next INR check:  2/1/2023   Priority:  Maintenance   Target end date:  Indefinite    Indications    Paroxysmal atrial fibrillation (HCC) [I48.0]             Anticoagulation Episode Summary     INR check location:      Preferred lab:      Send INR reminders to:   [facility]    Comments:  [facility] lab beginning 4/22/20      Anticoagulation Care Providers     Provider Role Specialty Phone number    Jonah Regalado Jr., MD Referring Cardiology [phone]          Clinic Interview:  Patient Findings     Negatives:  Signs/symptoms of thrombosis, Signs/symptoms of bleeding,   Laboratory test error suspected, Change in health, Change in alcohol use,   Change in activity, Upcoming invasive procedure, Emergency department   visit, Upcoming dental procedure, Missed doses, Extra doses, Change in   medications, Change in diet/appetite, Hospital admission, Bruising, Other   complaints      Clinical Outcomes     Negatives:  Major bleeding event, Thromboembolic event,   Anticoagulation-related hospital admission, Anticoagulation-related ED   visit, Anticoagulation-related fatality        INR History:  Anticoagulation Monitoring 12/21/2022 1/4/2023 1/18/2023   INR 3.00 4.00 3.40   INR Date 12/21/2022 1/4/2023 1/18/2023   INR Goal 2.0-3.0 2.0-3.0 2.0-3.0   Trend Same Same Down   Last Week Total 24 mg 24 mg 24 mg   Next Week Total 24 mg 18 mg 20 mg   Sun 4 mg 4 mg 2 mg   Mon 4 mg 4 mg 4 mg   Tue 2 mg 2 mg 2 mg   Wed 4 mg Hold (1/4); Otherwise 4 mg 2 mg (1/18); Otherwise 4 mg   Thu 4 mg 2 mg (1/5); Otherwise 4 mg 4 mg   Fri 2 mg 2 mg 2 mg   Sat 4 mg 4 mg 4 mg   Visit  Report - - -   Some recent data might be hidden       Plan:  1. INR is Supratherapeutic today- see above in Anticoagulation Summary.   Will instruct Caitlyn Longoria to Change their warfarin regimen- see above in Anticoagulation Summary.  2. Follow up in 2 weeks  3.  They have been instructed to call if any changes in medications, doses, concerns, etc. Patient expresses understanding and has no further questions at this time.    Petra Oliveira RP

## 2023-01-25 ENCOUNTER — INFUSION (OUTPATIENT)
Dept: ONCOLOGY | Facility: HOSPITAL | Age: 88
End: 2023-01-25
Payer: MEDICARE

## 2023-01-25 ENCOUNTER — LAB (OUTPATIENT)
Dept: LAB | Facility: HOSPITAL | Age: 88
End: 2023-01-25
Payer: MEDICARE

## 2023-01-25 VITALS
SYSTOLIC BLOOD PRESSURE: 114 MMHG | OXYGEN SATURATION: 90 % | BODY MASS INDEX: 34.23 KG/M2 | RESPIRATION RATE: 18 BRPM | TEMPERATURE: 97.3 F | HEART RATE: 82 BPM | DIASTOLIC BLOOD PRESSURE: 70 MMHG | WEIGHT: 193.2 LBS

## 2023-01-25 DIAGNOSIS — C91.12 CLL (CHRONIC LYMPHOID LEUKEMIA) IN RELAPSE: Primary | ICD-10-CM

## 2023-01-25 DIAGNOSIS — C91.12 CLL (CHRONIC LYMPHOID LEUKEMIA) IN RELAPSE: ICD-10-CM

## 2023-01-25 LAB
BASOPHILS # BLD AUTO: 0.05 10*3/MM3 (ref 0–0.2)
BASOPHILS NFR BLD AUTO: 0.2 % (ref 0–1.5)
DEPRECATED RDW RBC AUTO: 54.7 FL (ref 37–54)
EOSINOPHIL # BLD AUTO: 0 10*3/MM3 (ref 0–0.4)
EOSINOPHIL NFR BLD AUTO: 0 % (ref 0.3–6.2)
ERYTHROCYTE [DISTWIDTH] IN BLOOD BY AUTOMATED COUNT: 15.1 % (ref 12.3–15.4)
HCT VFR BLD AUTO: 40 % (ref 34–46.6)
HGB BLD-MCNC: 12.9 G/DL (ref 12–15.9)
IGA1 MFR SER: <50 MG/DL (ref 70–400)
IGG1 SER-MCNC: 785 MG/DL (ref 700–1600)
IGM SERPL-MCNC: 30 MG/DL (ref 40–230)
IMM GRANULOCYTES # BLD AUTO: 0.09 10*3/MM3 (ref 0–0.05)
IMM GRANULOCYTES NFR BLD AUTO: 0.3 % (ref 0–0.5)
LYMPHOCYTES # BLD AUTO: 23.14 10*3/MM3 (ref 0.7–3.1)
LYMPHOCYTES NFR BLD AUTO: 83.4 % (ref 19.6–45.3)
MCH RBC QN AUTO: 31.9 PG (ref 26.6–33)
MCHC RBC AUTO-ENTMCNC: 32.3 G/DL (ref 31.5–35.7)
MCV RBC AUTO: 98.8 FL (ref 79–97)
MONOCYTES # BLD AUTO: 0.23 10*3/MM3 (ref 0.1–0.9)
MONOCYTES NFR BLD AUTO: 0.8 % (ref 5–12)
NEUTROPHILS NFR BLD AUTO: 15.3 % (ref 42.7–76)
NEUTROPHILS NFR BLD AUTO: 4.24 10*3/MM3 (ref 1.7–7)
NRBC BLD AUTO-RTO: 0 /100 WBC (ref 0–0.2)
PLATELET # BLD AUTO: 233 10*3/MM3 (ref 140–450)
PMV BLD AUTO: 9.2 FL (ref 6–12)
RBC # BLD AUTO: 4.05 10*6/MM3 (ref 3.77–5.28)
WBC NRBC COR # BLD: 27.75 10*3/MM3 (ref 3.4–10.8)

## 2023-01-25 PROCEDURE — 25010000002 IMMUNE GLOBULIN (HUMAN) 30 GM/300ML SOLUTION: Performed by: INTERNAL MEDICINE

## 2023-01-25 PROCEDURE — 36415 COLL VENOUS BLD VENIPUNCTURE: CPT

## 2023-01-25 PROCEDURE — 96365 THER/PROPH/DIAG IV INF INIT: CPT

## 2023-01-25 PROCEDURE — 82784 ASSAY IGA/IGD/IGG/IGM EACH: CPT | Performed by: INTERNAL MEDICINE

## 2023-01-25 PROCEDURE — 96366 THER/PROPH/DIAG IV INF ADDON: CPT

## 2023-01-25 PROCEDURE — 85025 COMPLETE CBC W/AUTO DIFF WBC: CPT

## 2023-01-25 RX ORDER — SODIUM CHLORIDE 9 MG/ML
250 INJECTION, SOLUTION INTRAVENOUS ONCE
Status: DISCONTINUED | OUTPATIENT
Start: 2023-01-25 | End: 2023-01-25 | Stop reason: HOSPADM

## 2023-01-25 RX ORDER — HYDROXYZINE PAMOATE 25 MG/1
25 CAPSULE ORAL ONCE
Status: COMPLETED | OUTPATIENT
Start: 2023-01-25 | End: 2023-01-25

## 2023-01-25 RX ORDER — ACETAMINOPHEN 325 MG/1
650 TABLET ORAL ONCE
Status: COMPLETED | OUTPATIENT
Start: 2023-01-25 | End: 2023-01-25

## 2023-01-25 RX ADMIN — IMMUNE GLOBULIN INFUSION (HUMAN) 30 G: 100 INJECTION, SOLUTION INTRAVENOUS; SUBCUTANEOUS at 09:12

## 2023-01-25 RX ADMIN — HYDROXYZINE PAMOATE 25 MG: 25 CAPSULE ORAL at 09:02

## 2023-01-25 RX ADMIN — ACETAMINOPHEN 650 MG: 325 TABLET ORAL at 09:02

## 2023-02-01 ENCOUNTER — ANTICOAGULATION VISIT (OUTPATIENT)
Dept: PHARMACY | Facility: HOSPITAL | Age: 88
End: 2023-02-01
Payer: MEDICARE

## 2023-02-01 DIAGNOSIS — I48.0 PAROXYSMAL ATRIAL FIBRILLATION: Primary | ICD-10-CM

## 2023-02-01 LAB — INR PPP: 2.6

## 2023-02-01 NOTE — PROGRESS NOTES
Anticoagulation Clinic Progress Note    Anticoagulation Summary  As of 2023    INR goal:  2.0-3.0   TTR:  66.5 % (4.1 y)   INR used for dosin.60 (2023)   Warfarin maintenance plan:  2 mg every Sun, Tue, Fri; 4 mg all other days; Starting 2023   Weekly warfarin total:  22 mg   No change documented:  Petra Oliveira RPH   Plan last modified:  Petra Oliveira RPH (2023)   Next INR check:  2/15/2023   Priority:  Maintenance   Target end date:  Indefinite    Indications    Paroxysmal atrial fibrillation (HCC) [I48.0]             Anticoagulation Episode Summary     INR check location:      Preferred lab:      Send INR reminders to:   [facility] [place]    Comments:  [facility] lab beginning 20      Anticoagulation Care Providers     Provider Role Specialty Phone number    Jonah Regalado Jr., MD Referring Cardiology [phone]          Clinic Interview:  Patient Findings     Negatives:  Signs/symptoms of thrombosis, Signs/symptoms of bleeding,   Laboratory test error suspected, Change in health, Change in alcohol use,   Change in activity, Upcoming invasive procedure, Emergency department   visit, Upcoming dental procedure, Missed doses, Extra doses, Change in   medications, Change in diet/appetite, Hospital admission, Bruising, Other   complaints      Clinical Outcomes     Negatives:  Major bleeding event, Thromboembolic event,   Anticoagulation-related hospital admission, Anticoagulation-related ED   visit, Anticoagulation-related fatality        INR History:  Anticoagulation Monitoring 2023   INR 4.00 3.40 2.60   INR Date 2023   INR Goal 2.0-3.0 2.0-3.0 2.0-3.0   Trend Same Down Same   Last Week Total 24 mg 24 mg 22 mg   Next Week Total 18 mg 20 mg 22 mg   Sun 4 mg 2 mg 2 mg   Mon 4 mg 4 mg 4 mg   Tue 2 mg 2 mg 2 mg   Wed Hold (); Otherwise 4 mg 2 mg (); Otherwise 4 mg 4 mg   Thu 2 mg (); Otherwise 4 mg 4 mg 4 mg   Fri 2  mg 2 mg 2 mg   Sat 4 mg 4 mg 4 mg   Visit Report - - -   Some recent data might be hidden       Plan:  1. INR is Therapeutic today- see above in Anticoagulation Summary.   Will instruct Caitlyn Longoria to Continue their warfarin regimen- see above in Anticoagulation Summary.  2. Follow up in 2 weeks  3. They have been instructed to call if any changes in medications, doses, concerns, etc. Patient expresses understanding and has no further questions at this time.    Petra Oliveira, [facility]

## 2023-02-15 ENCOUNTER — ANTICOAGULATION VISIT (OUTPATIENT)
Dept: PHARMACY | Facility: HOSPITAL | Age: 88
End: 2023-02-15
Payer: MEDICARE

## 2023-02-15 DIAGNOSIS — I48.0 PAROXYSMAL ATRIAL FIBRILLATION: Primary | ICD-10-CM

## 2023-02-15 LAB — INR PPP: 1.9

## 2023-02-16 NOTE — PROGRESS NOTES
Anticoagulation Clinic Progress Note    Anticoagulation Summary  As of 2/15/2023    INR goal:  2.0-3.0   TTR:  66.5 % (4.2 y)   INR used for dosin.90 (2/15/2023)   Warfarin maintenance plan:  2 mg every Sun, Tue, Fri; 4 mg all other days; Starting 2/15/2023   Weekly warfarin total:  22 mg   No change documented:  Jaylin Nick, PharmD   Plan last modified:  Petra Oliveira RPH (2023)   Next INR check:  3/1/2023   Priority:  Maintenance   Target end date:  Indefinite    Indications    Paroxysmal atrial fibrillation (HCC) [I48.0]             Anticoagulation Episode Summary     INR check location:      Preferred lab:      Send INR reminders to:   [facility] [place]    Comments:  [facility] Home lab beginning 20      Anticoagulation Care Providers     Provider Role Specialty Phone number    Jonah Regalado Jr., MD Referring Cardiology [phone]          Clinic Interview:  Patient Findings     Negatives:  Signs/symptoms of thrombosis, Signs/symptoms of bleeding,   Laboratory test error suspected, Change in health, Change in alcohol use,   Change in activity, Upcoming invasive procedure, Emergency department   visit, Upcoming dental procedure, Missed doses, Extra doses, Change in   medications, Change in diet/appetite, Hospital admission, Bruising, Other   complaints      Clinical Outcomes     Negatives:  Major bleeding event, Thromboembolic event,   Anticoagulation-related hospital admission, Anticoagulation-related ED   visit, Anticoagulation-related fatality        INR History:  Anticoagulation Monitoring 2023 2023 2/15/2023   INR 3.40 2.60 1.90   INR Date 2023 2023 2/15/2023   INR Goal 2.0-3.0 2.0-3.0 2.0-3.0   Trend Down Same Same   Last Week Total 24 mg 22 mg 22 mg   Next Week Total 20 mg 22 mg 22 mg   Sun 2 mg 2 mg 2 mg   Mon 4 mg 4 mg 4 mg   Tue 2 mg 2 mg 2 mg   Wed 2 mg (); Otherwise 4 mg 4 mg 4 mg   Thu 4 mg 4 mg 4 mg   Fri 2 mg 2 mg 2 mg   Sat 4 mg 4 mg 4 mg    Visit Report - - -   Some recent data might be hidden       Plan:  1. INR is Subtherapeutic today- see above in Anticoagulation Summary.   Will instruct Caitlyn GARRETT Prietosabina to Continue their warfarin regimen- see above in Anticoagulation Summary.  2. Follow up in 2 weeks  3. Left secure voicemail with instructions for patient.  They have been instructed to call if any changes in medications, doses, concerns, etc. Patient expresses understanding and has no further questions at this time.    Jaylin Nick, PharmD

## 2023-02-22 ENCOUNTER — INFUSION (OUTPATIENT)
Dept: ONCOLOGY | Facility: HOSPITAL | Age: 88
End: 2023-02-22
Payer: MEDICARE

## 2023-02-22 ENCOUNTER — APPOINTMENT (OUTPATIENT)
Dept: OTHER | Facility: HOSPITAL | Age: 88
End: 2023-02-22
Payer: MEDICARE

## 2023-02-22 VITALS
TEMPERATURE: 96.3 F | OXYGEN SATURATION: 93 % | BODY MASS INDEX: 35.85 KG/M2 | HEART RATE: 80 BPM | RESPIRATION RATE: 18 BRPM | SYSTOLIC BLOOD PRESSURE: 110 MMHG | WEIGHT: 194.8 LBS | HEIGHT: 62 IN | DIASTOLIC BLOOD PRESSURE: 70 MMHG

## 2023-02-22 DIAGNOSIS — C91.12 CLL (CHRONIC LYMPHOID LEUKEMIA) IN RELAPSE: Primary | ICD-10-CM

## 2023-02-22 LAB
BASOPHILS # BLD AUTO: 0.01 10*3/MM3 (ref 0–0.2)
BASOPHILS NFR BLD AUTO: 0.1 % (ref 0–1.5)
DEPRECATED RDW RBC AUTO: 56.5 FL (ref 37–54)
EOSINOPHIL # BLD AUTO: 0 10*3/MM3 (ref 0–0.4)
EOSINOPHIL NFR BLD AUTO: 0 % (ref 0.3–6.2)
ERYTHROCYTE [DISTWIDTH] IN BLOOD BY AUTOMATED COUNT: 15.1 % (ref 12.3–15.4)
HCT VFR BLD AUTO: 40 % (ref 34–46.6)
HGB BLD-MCNC: 12.3 G/DL (ref 12–15.9)
IGA1 MFR SER: <50 MG/DL (ref 70–400)
IGG1 SER-MCNC: 868 MG/DL (ref 700–1600)
IGM SERPL-MCNC: 27 MG/DL (ref 40–230)
IMM GRANULOCYTES # BLD AUTO: 0.02 10*3/MM3 (ref 0–0.05)
IMM GRANULOCYTES NFR BLD AUTO: 0.2 % (ref 0–0.5)
LYMPHOCYTES # BLD AUTO: 6.17 10*3/MM3 (ref 0.7–3.1)
LYMPHOCYTES NFR BLD AUTO: 65.6 % (ref 19.6–45.3)
MCH RBC QN AUTO: 31.1 PG (ref 26.6–33)
MCHC RBC AUTO-ENTMCNC: 30.8 G/DL (ref 31.5–35.7)
MCV RBC AUTO: 101 FL (ref 79–97)
MONOCYTES # BLD AUTO: 0.18 10*3/MM3 (ref 0.1–0.9)
MONOCYTES NFR BLD AUTO: 1.9 % (ref 5–12)
NEUTROPHILS NFR BLD AUTO: 3.03 10*3/MM3 (ref 1.7–7)
NEUTROPHILS NFR BLD AUTO: 32.2 % (ref 42.7–76)
NRBC BLD AUTO-RTO: 0 /100 WBC (ref 0–0.2)
PLAT MORPH BLD: NORMAL
PLATELET # BLD AUTO: 131 10*3/MM3 (ref 140–450)
PMV BLD AUTO: 10.3 FL (ref 6–12)
RBC # BLD AUTO: 3.96 10*6/MM3 (ref 3.77–5.28)
RBC MORPH BLD: NORMAL
WBC MORPH BLD: NORMAL
WBC NRBC COR # BLD: 9.41 10*3/MM3 (ref 3.4–10.8)

## 2023-02-22 PROCEDURE — A9270 NON-COVERED ITEM OR SERVICE: HCPCS | Performed by: INTERNAL MEDICINE

## 2023-02-22 PROCEDURE — 82784 ASSAY IGA/IGD/IGG/IGM EACH: CPT | Performed by: INTERNAL MEDICINE

## 2023-02-22 PROCEDURE — 25010000002 IMMUNE GLOBULIN (HUMAN) 30 GM/300ML SOLUTION: Performed by: INTERNAL MEDICINE

## 2023-02-22 PROCEDURE — 96366 THER/PROPH/DIAG IV INF ADDON: CPT

## 2023-02-22 PROCEDURE — 63710000001 HYDROXYZINE PAMOATE PER 25 MG: Performed by: INTERNAL MEDICINE

## 2023-02-22 PROCEDURE — 85025 COMPLETE CBC W/AUTO DIFF WBC: CPT | Performed by: INTERNAL MEDICINE

## 2023-02-22 PROCEDURE — 85007 BL SMEAR W/DIFF WBC COUNT: CPT | Performed by: INTERNAL MEDICINE

## 2023-02-22 PROCEDURE — 96365 THER/PROPH/DIAG IV INF INIT: CPT

## 2023-02-22 PROCEDURE — 63710000001 ACETAMINOPHEN 325 MG TABLET: Performed by: INTERNAL MEDICINE

## 2023-02-22 RX ORDER — HYDROXYZINE PAMOATE 25 MG/1
25 CAPSULE ORAL ONCE
Status: COMPLETED | OUTPATIENT
Start: 2023-02-22 | End: 2023-02-22

## 2023-02-22 RX ORDER — SODIUM CHLORIDE 9 MG/ML
250 INJECTION, SOLUTION INTRAVENOUS ONCE
Status: COMPLETED | OUTPATIENT
Start: 2023-02-22 | End: 2023-02-22

## 2023-02-22 RX ORDER — ACETAMINOPHEN 325 MG/1
650 TABLET ORAL ONCE
Status: COMPLETED | OUTPATIENT
Start: 2023-02-22 | End: 2023-02-22

## 2023-02-22 RX ADMIN — SODIUM CHLORIDE 250 ML: 9 INJECTION, SOLUTION INTRAVENOUS at 09:05

## 2023-02-22 RX ADMIN — HYDROXYZINE PAMOATE 25 MG: 25 CAPSULE ORAL at 09:06

## 2023-02-22 RX ADMIN — IMMUNE GLOBULIN INFUSION (HUMAN) 30 G: 100 INJECTION, SOLUTION INTRAVENOUS; SUBCUTANEOUS at 09:23

## 2023-02-22 RX ADMIN — ACETAMINOPHEN 650 MG: 325 TABLET ORAL at 09:06

## 2023-03-01 ENCOUNTER — ANTICOAGULATION VISIT (OUTPATIENT)
Dept: PHARMACY | Facility: HOSPITAL | Age: 88
End: 2023-03-01
Payer: MEDICARE

## 2023-03-01 DIAGNOSIS — I48.0 PAROXYSMAL ATRIAL FIBRILLATION: Primary | ICD-10-CM

## 2023-03-01 LAB — INR PPP: 2

## 2023-03-01 NOTE — PROGRESS NOTES
Anticoagulation Clinic Progress Note    Anticoagulation Summary  As of 3/1/2023    INR goal:  2.0-3.0   TTR:  66.0 % (4.2 y)   INR used for dosin.00 (3/1/2023)   Warfarin maintenance plan:  2 mg every Sun, Tue, Fri; 4 mg all other days; Starting 3/1/2023   Weekly warfarin total:  22 mg   No change documented:  Petra Oliveira RPH   Plan last modified:  Petra Oliveira RPH (2023)   Next INR check:  3/29/2023   Priority:  Maintenance   Target end date:  Indefinite    Indications    Paroxysmal atrial fibrillation (HCC) [I48.0]             Anticoagulation Episode Summary     INR check location:      Preferred lab:      Send INR reminders to:   [facility] [place]    Comments:  [place] lab beginning 20      Anticoagulation Care Providers     Provider Role Specialty Phone number    Jonah Regalado Jr., MD Referring Cardiology [phone]          Clinic Interview:  Patient Findings     Negatives:  Signs/symptoms of thrombosis, Signs/symptoms of bleeding,   Laboratory test error suspected, Change in health, Change in alcohol use,   Change in activity, Upcoming invasive procedure, Emergency department   visit, Upcoming dental procedure, Missed doses, Extra doses, Change in   medications, Change in diet/appetite, Hospital admission, Bruising, Other   complaints      Clinical Outcomes     Negatives:  Major bleeding event, Thromboembolic event,   Anticoagulation-related hospital admission, Anticoagulation-related ED   visit, Anticoagulation-related fatality        INR History:  Anticoagulation Monitoring 2023 2/15/2023 3/1/2023   INR 2.60 1.90 2.00   INR Date 2023 2/15/2023 3/1/2023   INR Goal 2.0-3.0 2.0-3.0 2.0-3.0   Trend Same Same Same   Last Week Total 22 mg 22 mg 22 mg   Next Week Total 22 mg 22 mg 22 mg   Sun 2 mg 2 mg 2 mg   Mon 4 mg 4 mg 4 mg   Tue 2 mg 2 mg 2 mg   Wed 4 mg 4 mg 4 mg   Thu 4 mg 4 mg 4 mg   Fri 2 mg 2 mg 2 mg   Sat 4 mg 4 mg 4 mg   Visit Report - - -   Some recent  data might be hidden       Plan:  1. INR is Therapeutic today- see above in Anticoagulation Summary.   Will instruct Caitlyn Longoria to Continue their warfarin regimen- see above in Anticoagulation Summary.  2. Follow up in 4 weeks  3. They have been instructed to call if any changes in medications, doses, concerns, etc. Patient expresses understanding and has no further questions at this time.    Petra Oliveira, [facility]

## 2023-03-22 ENCOUNTER — INFUSION (OUTPATIENT)
Dept: ONCOLOGY | Facility: HOSPITAL | Age: 88
End: 2023-03-22
Payer: MEDICARE

## 2023-03-22 ENCOUNTER — LAB (OUTPATIENT)
Dept: LAB | Facility: HOSPITAL | Age: 88
End: 2023-03-22
Payer: MEDICARE

## 2023-03-22 ENCOUNTER — OFFICE VISIT (OUTPATIENT)
Dept: ONCOLOGY | Facility: CLINIC | Age: 88
End: 2023-03-22
Payer: MEDICARE

## 2023-03-22 VITALS
HEART RATE: 89 BPM | HEIGHT: 62 IN | WEIGHT: 193.5 LBS | OXYGEN SATURATION: 97 % | DIASTOLIC BLOOD PRESSURE: 75 MMHG | RESPIRATION RATE: 16 BRPM | SYSTOLIC BLOOD PRESSURE: 110 MMHG | BODY MASS INDEX: 35.61 KG/M2 | TEMPERATURE: 97.5 F

## 2023-03-22 DIAGNOSIS — C91.12 CLL (CHRONIC LYMPHOID LEUKEMIA) IN RELAPSE: Primary | ICD-10-CM

## 2023-03-22 DIAGNOSIS — C91.12 CLL (CHRONIC LYMPHOID LEUKEMIA) IN RELAPSE: ICD-10-CM

## 2023-03-22 DIAGNOSIS — D80.1 HYPOGAMMAGLOBULINEMIA: ICD-10-CM

## 2023-03-22 LAB
BASOPHILS # BLD AUTO: 0.03 10*3/MM3 (ref 0–0.2)
BASOPHILS NFR BLD AUTO: 0.3 % (ref 0–1.5)
DEPRECATED RDW RBC AUTO: 53.1 FL (ref 37–54)
EOSINOPHIL # BLD AUTO: 0.09 10*3/MM3 (ref 0–0.4)
EOSINOPHIL NFR BLD AUTO: 0.8 % (ref 0.3–6.2)
ERYTHROCYTE [DISTWIDTH] IN BLOOD BY AUTOMATED COUNT: 14.7 % (ref 12.3–15.4)
HCT VFR BLD AUTO: 40.6 % (ref 34–46.6)
HGB BLD-MCNC: 12.7 G/DL (ref 12–15.9)
IGA1 MFR SER: <50 MG/DL (ref 70–400)
IGG1 SER-MCNC: 919 MG/DL (ref 700–1600)
IGM SERPL-MCNC: 26 MG/DL (ref 40–230)
IMM GRANULOCYTES # BLD AUTO: 0.08 10*3/MM3 (ref 0–0.05)
IMM GRANULOCYTES NFR BLD AUTO: 0.7 % (ref 0–0.5)
LYMPHOCYTES # BLD AUTO: 7.24 10*3/MM3 (ref 0.7–3.1)
LYMPHOCYTES NFR BLD AUTO: 67.2 % (ref 19.6–45.3)
MCH RBC QN AUTO: 30.9 PG (ref 26.6–33)
MCHC RBC AUTO-ENTMCNC: 31.3 G/DL (ref 31.5–35.7)
MCV RBC AUTO: 98.8 FL (ref 79–97)
MONOCYTES # BLD AUTO: 0.28 10*3/MM3 (ref 0.1–0.9)
MONOCYTES NFR BLD AUTO: 2.6 % (ref 5–12)
NEUTROPHILS NFR BLD AUTO: 28.4 % (ref 42.7–76)
NEUTROPHILS NFR BLD AUTO: 3.05 10*3/MM3 (ref 1.7–7)
NRBC BLD AUTO-RTO: 0 /100 WBC (ref 0–0.2)
PLATELET # BLD AUTO: 102 10*3/MM3 (ref 140–450)
PMV BLD AUTO: 10.8 FL (ref 6–12)
RBC # BLD AUTO: 4.11 10*6/MM3 (ref 3.77–5.28)
WBC NRBC COR # BLD: 10.77 10*3/MM3 (ref 3.4–10.8)

## 2023-03-22 PROCEDURE — 99214 OFFICE O/P EST MOD 30 MIN: CPT | Performed by: NURSE PRACTITIONER

## 2023-03-22 PROCEDURE — 96366 THER/PROPH/DIAG IV INF ADDON: CPT

## 2023-03-22 PROCEDURE — 82784 ASSAY IGA/IGD/IGG/IGM EACH: CPT | Performed by: INTERNAL MEDICINE

## 2023-03-22 PROCEDURE — 25010000002 IMMUNE GLOBULIN (HUMAN) 30 GM/300ML SOLUTION: Performed by: NURSE PRACTITIONER

## 2023-03-22 PROCEDURE — 1160F RVW MEDS BY RX/DR IN RCRD: CPT | Performed by: NURSE PRACTITIONER

## 2023-03-22 PROCEDURE — 63710000001 ACETAMINOPHEN 325 MG TABLET: Performed by: NURSE PRACTITIONER

## 2023-03-22 PROCEDURE — 1126F AMNT PAIN NOTED NONE PRSNT: CPT | Performed by: NURSE PRACTITIONER

## 2023-03-22 PROCEDURE — A9270 NON-COVERED ITEM OR SERVICE: HCPCS | Performed by: NURSE PRACTITIONER

## 2023-03-22 PROCEDURE — 96365 THER/PROPH/DIAG IV INF INIT: CPT

## 2023-03-22 PROCEDURE — 36415 COLL VENOUS BLD VENIPUNCTURE: CPT

## 2023-03-22 PROCEDURE — 1159F MED LIST DOCD IN RCRD: CPT | Performed by: NURSE PRACTITIONER

## 2023-03-22 PROCEDURE — 85025 COMPLETE CBC W/AUTO DIFF WBC: CPT

## 2023-03-22 PROCEDURE — 63710000001 HYDROXYZINE PAMOATE PER 25 MG: Performed by: NURSE PRACTITIONER

## 2023-03-22 RX ORDER — FAMOTIDINE 10 MG/ML
20 INJECTION, SOLUTION INTRAVENOUS AS NEEDED
Status: CANCELLED | OUTPATIENT
Start: 2023-03-22

## 2023-03-22 RX ORDER — SODIUM CHLORIDE 9 MG/ML
250 INJECTION, SOLUTION INTRAVENOUS ONCE
Status: CANCELLED | OUTPATIENT
Start: 2023-03-22

## 2023-03-22 RX ORDER — SODIUM CHLORIDE FOR INHALATION 7 %
VIAL, NEBULIZER (ML) INHALATION
COMMUNITY
Start: 2023-02-23

## 2023-03-22 RX ORDER — HYDROXYZINE PAMOATE 25 MG/1
25 CAPSULE ORAL ONCE
Status: CANCELLED | OUTPATIENT
Start: 2023-03-22

## 2023-03-22 RX ORDER — ACETAMINOPHEN 325 MG/1
650 TABLET ORAL ONCE
Status: COMPLETED | OUTPATIENT
Start: 2023-03-22 | End: 2023-03-22

## 2023-03-22 RX ORDER — DIPHENHYDRAMINE HYDROCHLORIDE 50 MG/ML
50 INJECTION INTRAMUSCULAR; INTRAVENOUS AS NEEDED
Status: CANCELLED | OUTPATIENT
Start: 2023-03-22

## 2023-03-22 RX ORDER — SODIUM CHLORIDE 9 MG/ML
250 INJECTION, SOLUTION INTRAVENOUS ONCE
Status: COMPLETED | OUTPATIENT
Start: 2023-03-22 | End: 2023-03-22

## 2023-03-22 RX ORDER — HYDROXYZINE PAMOATE 25 MG/1
25 CAPSULE ORAL ONCE
Status: COMPLETED | OUTPATIENT
Start: 2023-03-22 | End: 2023-03-22

## 2023-03-22 RX ORDER — ACETAMINOPHEN 325 MG/1
650 TABLET ORAL ONCE
Status: CANCELLED | OUTPATIENT
Start: 2023-03-22

## 2023-03-22 RX ADMIN — IMMUNE GLOBULIN INFUSION (HUMAN) 30 G: 100 INJECTION, SOLUTION INTRAVENOUS; SUBCUTANEOUS at 11:46

## 2023-03-22 RX ADMIN — SODIUM CHLORIDE 250 ML: 9 INJECTION, SOLUTION INTRAVENOUS at 11:26

## 2023-03-22 RX ADMIN — ACETAMINOPHEN 650 MG: 325 TABLET ORAL at 11:26

## 2023-03-22 RX ADMIN — HYDROXYZINE PAMOATE 25 MG: 25 CAPSULE ORAL at 11:26

## 2023-03-22 NOTE — PROGRESS NOTES
Subjective .     REASONS FOR FOLLOW UP:  1. chronic lymphocytic leukemia with recurrent lung infections    Referring MD:    Amarilis Suarez MD    History of Present Illness patient is an.age female with 2014 with stage 0 CLL which is never required treatments.    In 2019 she  had multiple hospitalizations for lung infections predominantly viral probably also bacterial and at her last hospitalization in October we rechecked her gammaglobulins which were low and opted to start IV IgG monthly to see if this would keep her out of the hospital.    She has continued on monthly IVIG since from October through March .  The patient  happily reports she has had almost no infection since starting IVIG.      She did however had bronchitis in january and went to the [facility] and they gave her 50 of prednisone for 5 days and doxycycline which she is just finishing. this is why her white count to go up to 32,000.    she reports feeling quite well with walking and doing exercise classes including yoga.  Her body aches are improving as is her energy level.  She has had no recent infections     No fever sweats or weight loss.  She denies bleeding.    Past Medical History:   Diagnosis Date   • Aortic calcification (HCC)     mild, on echo 12/17/2017   • Aortic regurgitation     Trace   • Asthma    • Atrial fibrillation ([facility])    • CAD (coronary artery disease)    • Chronic combined systolic and diastolic congestive heart failure ([facility])    • CKD (chronic kidney disease) stage 3, GFR 30-59 ml/min ([facility])    • Coronary artery disease involving native coronary artery of native heart with angina pectoris ([facility])    • Disc degeneration, lumbar    • DM type 2 (diabetes mellitus, type 2) ([facility])    • GERD (gastroesophageal reflux disease)    • History of aneurysm     right femoral artery s/p LHC   • History of blood transfusion    • History of fracture    • History of vitamin D deficiency    • Hyperlipidemia    • Hypertension    • Mild  mitral regurgitation    • Mitral annular calcification     12/8/2017- echo, moderate   • PAF (paroxysmal atrial fibrillation) (HCC)    • Peripheral neuropathy    • Skin cancer     Left hand   • Sleep apnea     bipap   • SSS (sick sinus syndrome) (HCC)    • Stroke (cerebrum) (HCC)    • Tricuspid regurgitation     Trace       ONCOLOGIC HISTORY:    [facility] HISTORY  Patient is an 85-year-old female whom I had seen in 2014 when she was hospitalized at [facility] and was diagnosed with chronic lymphocytic leukemia.  She has not been to see me in over 4 years and is following with Dr. Suarez her primary care doctor and her white count is gone up a little higher than usual to 22,000 and she is referred back to us for evaluation.  We are doing a telephone visit because of the coronavirus pandemic and her age and susceptibility.    When I originally saw her in 2014 she was brought into the ER unresponsive in septic shock on 03/25/2014 with methicillin-resistant staphylococcus identified in her blood cultures. The patient has been on antibiotic with improvement, but had some residual kidney damage with a creatinine in the 4-range requiring hemodialysis, and was noted on admission to have an elevated white count with lymphocytosis, normal hemoglobin, but a platelet count of 95,000, and over the course of the illness her platelet count normalized and the white count had gone up into the 20,000 range with lymphocytosis. A flow cytometry was sent which is consistent with CLL, she came to our office once or twice and then stopped coming because she was so stable     She tells me now she was hospitalized recently with rhinovirus infection and has had diagnosed with asthma and is having a lot of respiratory issues but does not require oxygen.She is at the [facility] home in independent living and managing fairly well    She denies fever sweats or weight loss.  Her last white count was on 5/26/2020  Showed a white count of 18,000 with 66  lymphs and 27 neutrophils platelet white hemoglobin is 12.6 platelet 188,000    I reassured Christopher that no treatment is indicated for this level of leukocytosis from CLL and if she has no systemic complaints I do not feel strongly about doing CAT scans at her age but I would like to physically examine her in the office in a couple of months to make sure there is no obvious adenopathy or hepatosplenomegaly.    She does have recurrent infections and we can check quantitative immunoglobulins to see how low they are as another adjunctive option to prevent recurrent infections if she has hypogammaglobulinemia    She remains on Coumadin for atrial fibrillation      Current Outpatient Medications on File Prior to Visit   Medication Sig Dispense Refill   • acetaminophen (TYLENOL) 325 MG tablet Take 2 tablets by mouth Every 4 (Four) Hours As Needed for Mild Pain .     • albuterol (PROVENTIL) (2.5 MG/3ML) 0.083% nebulizer solution Take 2.5 mg by nebulization Every 4 (Four) Hours.     • albuterol sulfate  (90 Base) MCG/ACT inhaler Inhale 2 puffs Every 4 (Four) Hours As Needed for Wheezing. 1 inhaler 3   • AMOXICILLIN PO Take  by mouth. DENTAL PROCEDURES/CLEANINGS     • Calcium Carbonate-Vitamin D (calcium-vitamin D) 500-200 MG-UNIT tablet per tablet Take 1 tablet by mouth.     • carvedilol (COREG) 3.125 MG tablet Take 1 tablet by mouth 2 (Two) Times a Day. 180 tablet 2   • cephalexin (KEFLEX) 500 MG capsule Take 1 capsule by mouth 3 (Three) Times a Day for 90 days. 270 capsule 3   • Cetirizine HCl 10 MG capsule Take  by mouth.     • fluticasone (FLONASE) 50 MCG/ACT nasal spray 1 spray into the nostril(s) as directed by provider Daily.     • Fluticasone-Umeclidin-Vilant (TRELEGY) 100-62.5-25 MCG/INH inhaler Inhale 1 puff Daily. As directed     • furosemide (LASIX) 20 MG tablet Take 1 tablet by mouth As Needed (For weight gain of greater than 2 pounds in 1 day or 5 pounds in 1 week). 30 tablet 11   • glipizide (GLUCOTROL)  5 MG tablet Take 1 tablet by mouth. Take one half tablet by mouth daily     • guaiFENesin (MUCINEX) 600 MG 12 hr tablet Take 1 tablet by mouth Every 12 (Twelve) Hours.     • ketoconazole (NIZORAL) 2 % cream Apply 1 application topically to the appropriate area as directed 2 (Two) Times a Day.     • Loperamide HCl (IMODIUM PO) Take  by mouth Daily.     • Melatonin 5 MG capsule Take  by mouth.     • metFORMIN ER (GLUCOPHAGE-XR) 500 MG 24 hr tablet      • montelukast (SINGULAIR) 10 MG tablet Take 1 tablet by mouth Every Night.     • nystatin (MYCOSTATIN) 126371 UNIT/GM cream      • oxybutynin XL (DITROPAN-XL) 10 MG 24 hr tablet Take 1 tablet by mouth every night at bedtime.     • polyethyl glycol-propyl glycol (SYSTANE) 0.4-0.3 % solution ophthalmic solution (artificial tears) Administer 1 drop to both eyes Every 1 (One) Hour As Needed.     • rosuvastatin (CRESTOR) 20 MG tablet Take 1 tablet by mouth Every Night.     • warfarin (COUMADIN) 4 MG tablet Take one-half tablet (2 mg) by mouth on Tues and Fri, and take one tablet (4 mg) by mouth all other days or as directed. 80 tablet 1   • acetylcysteine (MUCOMYST) 20 % nebulizer solution Take 2 mL by nebulization 4 (Four) Times a Day.     • Benralizumab (FASENRA SC) Inject  under the skin into the appropriate area as directed. Gets one shot a month, next shot may 13 then one every 8 weeks     • Diclofenac Sodium (VOLTAREN) 1 % gel gel Apply 4 g topically to the appropriate area as directed 2 (Two) Times a Day. (Patient not taking: Reported on 3/22/2023) 100 g 0   • LORazepam (ATIVAN) 0.5 MG tablet  (Patient not taking: Reported on 3/22/2023)     • melatonin 5 MG tablet tablet Take 5 mg by mouth Every Night. (Patient not taking: Reported on 3/22/2023)     • mupirocin (BACTROBAN) 2 % ointment  (Patient not taking: Reported on 3/22/2023)     • OXYBUTYNIN CHLORIDE PO Take 20 mg by mouth Daily. (Patient not taking: Reported on 3/22/2023)     • Tobramycin 300 MG/4ML nebulizer  solution  (Patient not taking: Reported on 3/22/2023)     • [DISCONTINUED] glipizide (GLUCOTROL) 5 MG tablet Take 1 tablet by mouth 2 (Two) Times a Day Before Meals. (Patient not taking: Reported on 3/22/2023)       No current facility-administered medications on file prior to visit.       ALLERGIES:     Allergies   Allergen Reactions   • Accupril [Quinapril Hcl] Swelling, Other (See Comments), GI Intolerance and Delirium     HA, constipation    • Ahist [Chlorpheniramine] Nausea Only, Other (See Comments) and Dizziness     Headache, Blurred vision   • Clarithromycin Nausea Only, Other (See Comments) and Mental Status Change     HA, Depression, Flushing   • Esomeprazole GI Intolerance   • Levalbuterol Swelling   • Levocetirizine Diarrhea and GI Intolerance   • Lipitor [Atorvastatin] Other (See Comments) and Myalgia     Dark urine   • Omeprazole Nausea Only and Other (See Comments)     HA   • Pravachol [Pravastatin] Nausea Only and GI Intolerance     Bloated, Constipation, HA   • Sulindac Other (See Comments) and Myalgia     HA, joint pain, bruising   • Valdecoxib Irritability   • Chlorcyclizine Unknown - Low Severity   • Diclofenac Sodium Unknown - Low Severity   • Diphenhydramine Unknown - Low Severity   • Lodine [Etodolac] Unknown - Low Severity   • Sulfa Antibiotics Unknown - Low Severity       Social History     Socioeconomic History   • Marital status: Single   Tobacco Use   • Smoking status: Never   • Smokeless tobacco: Never   • Tobacco comments:     caffeine use- soda   Vaping Use   • Vaping Use: Never used   Substance and Sexual Activity   • Alcohol use: Never   • Drug use: No   • Sexual activity: Defer         Cancer-related family history includes Colon cancer in her sister.     I have reviewed the patient's medical history in detail and updated the computerized patient record.    Review of Systems   Constitutional: Negative for appetite change, chills, diaphoresis, fatigue, fever and unexpected weight  change.   HENT: Negative for mouth sores and sore throat.    Eyes: Negative for visual disturbance.   Respiratory: Positive for shortness of breath (stable). Negative for cough and wheezing.    Gastrointestinal: Negative.  Negative for abdominal pain, diarrhea, nausea and vomiting.   Genitourinary: Negative.  Negative for dysuria and frequency.   Musculoskeletal: Positive for back pain.   Neurological: Negative.  Negative for dizziness and weakness.   Hematological: Does not bruise/bleed easily.   Psychiatric/Behavioral: Negative.  The patient is not nervous/anxious.    All other systems reviewed and are negative.  I have reviewed and confirmed the accuracy of the ROS as documented by the MA/LPN/RN KRISTIN Collazo      Objective      Vitals:    03/22/23 0943   BP: 110/75   Pulse: 89   Resp: 16   Temp: 97.5 °F (36.4 °C)   SpO2: 97%     Current Status 3/22/2023   ECOG score 3     Pain Score    03/22/23 0943   PainSc: 0-No pain       CONSTITUTIONAL:  Vital signs reviewed.  No distress, looks comfortable.  EYES:  Conjunctiva and lids unremarkable.  PERRLA  EARS,NOSE,MOUTH,THROAT: Hearing intact.  Lips, teeth, gums appear unremarkable.  RESPIRATORY:  Normal respiratory effort.  Lungs clear to auscultation on the right decreased breath breath sounds lower half of the left lung  CARDIOVASCULAR:  Normal S1, S2.  No murmurs rubs or gallops.  No significant lower extremity edema.  GASTROINTESTINAL: Abdomen appears unremarkable.  Nontender.  No hepatomegaly.  No splenomegaly.  LYMPHATIC:  No cervical, supraclavicular lymphadenopathy.  SKIN:  Warm.  No rashes.  Numerous ecchymosis on her arms and legs  PSYCHIATRIC:  Normal judgment and insight.  Normal mood and affect.   I have reexamined the patient 03/22/2023 and the results are consistent with the previously documented exam. KRISTIN Collazo       RECENT LABS:  Results from last 7 days   Lab Units 03/22/23  0903   WBC 10*3/mm3 10.77   NEUTROS ABS 10*3/mm3 3.05    HEMOGLOBIN g/dL 12.7   HEMATOCRIT % 40.6   PLATELETS 10*3/mm3 102*       IgG 9084, IgM 30, IgA 15.             Assessment & Plan    1. Chronic lymphocytic leukemia  -stage 0 since 2014 untreated  White count increased to 32,000 in 12/22 after prednisone pack.  Today improved to 10.7    2.  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  The patient received IVIG treatments October 2020 through March 2021.  We have held IVIG since that time and the patient is happy to report no infections aside from some urinary tract infection which she has chronically.  Otherwise she has been feeling very well.  · Resume IV IgG from October through March 2022 monthly  · Resume IVIG from October through March 2023 monthly, today will be her last dose until next fall.  She has done well this winter aside from bronchitis once.       3.  COPD/asthma /emphysema on home O2-recurrent respiratory infections with RSV and rhinovirus and bacteria    4.  Atrial fibrillation on Coumadin    5.  Hypertension/hypercholesterolemia/type 2 diabetes on treatment    6.  Vaccinated against coronavirus    7.  Mild anemia-currently normal    8.  Platelet count has declined today to 102,000.  Patient denies NSAIDS.  No antibiotics this month.  Will have her return in 1 month for CBC and RN review to make sure platelet count stable to improved.     Plan  1.  Proceed with IVIG today  2. Return in 1 month for CBC and rn review  3.  Return in 3 month for cbc, CMP and dr Larkin.       Ester Rebolledo, APRN

## 2023-03-29 ENCOUNTER — ANTICOAGULATION VISIT (OUTPATIENT)
Dept: PHARMACY | Facility: HOSPITAL | Age: 88
End: 2023-03-29
Payer: MEDICARE

## 2023-03-29 DIAGNOSIS — I48.0 PAROXYSMAL ATRIAL FIBRILLATION: Primary | ICD-10-CM

## 2023-03-29 LAB — INR PPP: 2

## 2023-04-10 ENCOUNTER — OFFICE VISIT (OUTPATIENT)
Dept: INFECTIOUS DISEASES | Facility: CLINIC | Age: 88
End: 2023-04-10
Payer: MEDICARE

## 2023-04-10 VITALS
BODY MASS INDEX: 35.84 KG/M2 | RESPIRATION RATE: 20 BRPM | DIASTOLIC BLOOD PRESSURE: 66 MMHG | SYSTOLIC BLOOD PRESSURE: 98 MMHG | HEART RATE: 89 BPM | WEIGHT: 196 LBS | TEMPERATURE: 97.1 F

## 2023-04-10 DIAGNOSIS — I48.91 ON COUMADIN FOR ATRIAL FIBRILLATION: ICD-10-CM

## 2023-04-10 DIAGNOSIS — E11.9 CONTROLLED TYPE 2 DIABETES MELLITUS WITHOUT COMPLICATION, WITHOUT LONG-TERM CURRENT USE OF INSULIN: ICD-10-CM

## 2023-04-10 DIAGNOSIS — T84.59XD INFECTION OF PROSTHETIC KNEE JOINT, SUBSEQUENT ENCOUNTER: ICD-10-CM

## 2023-04-10 DIAGNOSIS — D80.1 HYPOGAMMAGLOBULINEMIA: ICD-10-CM

## 2023-04-10 DIAGNOSIS — A49.01 MSSA (METHICILLIN SUSCEPTIBLE STAPHYLOCOCCUS AUREUS) INFECTION: Primary | ICD-10-CM

## 2023-04-10 DIAGNOSIS — C91.10 CLL (CHRONIC LYMPHOCYTIC LEUKEMIA): ICD-10-CM

## 2023-04-10 DIAGNOSIS — Z96.659 INFECTION OF PROSTHETIC KNEE JOINT, SUBSEQUENT ENCOUNTER: ICD-10-CM

## 2023-04-10 DIAGNOSIS — Z79.01 ON COUMADIN FOR ATRIAL FIBRILLATION: ICD-10-CM

## 2023-04-10 DIAGNOSIS — Z79.2 LONG TERM (CURRENT) USE OF ANTIBIOTICS: ICD-10-CM

## 2023-04-10 PROCEDURE — 99214 OFFICE O/P EST MOD 30 MIN: CPT | Performed by: INTERNAL MEDICINE

## 2023-04-10 RX ORDER — CEPHALEXIN 500 MG/1
500 CAPSULE ORAL 3 TIMES DAILY
COMMUNITY

## 2023-04-10 NOTE — PROGRESS NOTES
ID CLINIC NOTE    CC: f/u Prosthetic right knee infection - culture negative (history of MSSA)    HPI: Caitlyn Longoria is a 88 y.o. female here for f/u prosthetic right knee infection - culture negative though had a prior history of MSSA. She is on suppressive cephalexin 500 mg PO q8h since she is not a surgical candidate (or at least a poor surgical candidate) per my prior discussions Dr Hunter. She was initially on suppressive cefadroxil 500 mg PO BID but insurance changes caused the price to be too high.     She says her knee is doing well. No recent issues. Tolerating cephalexin w/o any side effects.    She continues on IVIg for hypogammaglobulinemia. She receives monthly infusions October-March.     She also follows with oncology for CLL. She is stage 0 and isn't requiring any treatment.     She is no longer on inhaled tobramycin.    PMH:  R knee replacement  R knee infection due to MSSA s/p liner exchange in 2014  DM2  Hernia repair  Pacemaker  Afib on coumadin  CLL - stage 0 since 2014 untreated  Hypogammaglobulinemia on IVIG  Pseudomonas pneumonia     Social History:  Retired from [employer]  Lives in [place]     Antbiotic allergies:    1. Sulfa  2. Clarithromycin - headache    Medications:   Current Outpatient Medications:   •  acetaminophen (TYLENOL) 325 MG tablet, Take 2 tablets by mouth Every 4 (Four) Hours As Needed for Mild Pain ., Disp:  , Rfl:   •  acetylcysteine (MUCOMYST) 20 % nebulizer solution, Take 2 mL by nebulization 4 (Four) Times a Day., Disp:  , Rfl:   •  albuterol (PROVENTIL) (2.5 MG/3ML) 0.083% nebulizer solution, Take 2.5 mg by nebulization Every 4 (Four) Hours., Disp: , Rfl:   •  albuterol sulfate  (90 Base) MCG/ACT inhaler, Inhale 2 puffs Every 4 (Four) Hours As Needed for Wheezing., Disp: 1 inhaler, Rfl: 3  •  AMOXICILLIN PO, Take  by mouth. DENTAL PROCEDURES/CLEANINGS, Disp: , Rfl:   •  Benralizumab (FASENRA SC), Inject  under the skin into the appropriate area as  directed. Gets one shot a month, next shot may 13 then one every 8 weeks, Disp: , Rfl:   •  Calcium Carbonate-Vitamin D (calcium-vitamin D) 500-200 MG-UNIT tablet per tablet, Take 1 tablet by mouth., Disp: , Rfl:   •  carvedilol (COREG) 3.125 MG tablet, Take 1 tablet by mouth 2 (Two) Times a Day., Disp: 180 tablet, Rfl: 2  •  Cetirizine HCl 10 MG capsule, Take  by mouth., Disp: , Rfl:   •  fluticasone (FLONASE) 50 MCG/ACT nasal spray, 1 spray into the nostril(s) as directed by provider Daily., Disp: , Rfl:   •  Fluticasone-Umeclidin-Vilant (TRELEGY) 100-62.5-25 MCG/INH inhaler, Inhale 1 puff Daily. As directed, Disp: , Rfl:   •  furosemide (LASIX) 20 MG tablet, Take 1 tablet by mouth As Needed (For weight gain of greater than 2 pounds in 1 day or 5 pounds in 1 week)., Disp: 30 tablet, Rfl: 11  •  glipizide (GLUCOTROL) 5 MG tablet, Take 1 tablet by mouth. Take one half tablet by mouth daily, Disp: , Rfl:   •  guaiFENesin (MUCINEX) 600 MG 12 hr tablet, Take 1 tablet by mouth Every 12 (Twelve) Hours., Disp:  , Rfl:   •  HyperSal 7 % nebulizer solution nebulizer solution, USE 1 VIAL VIA NEBULIZER TWICE DAILY, Disp: , Rfl:   •  ketoconazole (NIZORAL) 2 % cream, Apply 1 application topically to the appropriate area as directed 2 (Two) Times a Day., Disp: , Rfl:   •  Loperamide HCl (IMODIUM PO), Take  by mouth Daily., Disp: , Rfl:   •  melatonin 5 MG tablet tablet, Take 5 mg by mouth Every Night. (Patient not taking: Reported on 3/22/2023), Disp: , Rfl:   •  metFORMIN ER (GLUCOPHAGE-XR) 500 MG 24 hr tablet, , Disp: , Rfl:   •  montelukast (SINGULAIR) 10 MG tablet, Take 1 tablet by mouth Every Night., Disp: , Rfl:   •  nystatin (MYCOSTATIN) 908539 UNIT/GM cream, , Disp: , Rfl:   •  oxybutynin XL (DITROPAN-XL) 10 MG 24 hr tablet, Take 1 tablet by mouth every night at bedtime., Disp: , Rfl:   •  polyethyl glycol-propyl glycol (SYSTANE) 0.4-0.3 % solution ophthalmic solution (artificial tears), Administer 1 drop to both eyes  Every 1 (One) Hour As Needed., Disp: , Rfl:   •  rosuvastatin (CRESTOR) 20 MG tablet, Take 1 tablet by mouth Every Night., Disp: , Rfl:   •  warfarin (COUMADIN) 4 MG tablet, Take one-half tablet (2 mg) by mouth on Tues and Fri, and take one tablet (4 mg) by mouth all other days or as directed., Disp: 80 tablet, Rfl: 1    OBJECTIVE:  BP 98/66 Comment: denies any dizziness or chest pain. Asymptomatic.  Pulse 89   Temp 97.1 °F (36.2 °C)   Resp 20   Wt 88.9 kg (196 lb)   BMI 35.84 kg/m²     General: awake, alert, NAD, very nice  Eyes: no scleral icterus  Respiratory: normal work of breathing on room air  Musculoskeletal: R knee incision is healed; looks great; not warm or tender  Skin: No rashes; bruises on arms  Neurological: Alert and oriented x 3  Psychiatric: Normal mood and affect     DIAGNOSTICS:  CBC, BMP, A1c, and INR reviewed today  Lab Results   Component Value Date    WBC 10.77 03/22/2023    HGB 12.7 03/22/2023    HCT 40.6 03/22/2023    MCV 98.8 (H) 03/22/2023     (L) 03/22/2023     Lab Results   Component Value Date    GLUCOSE 140 (H) 12/28/2022    CALCIUM 9.1 12/28/2022     12/28/2022    K 3.7 12/28/2022    CO2 25.0 12/28/2022     12/28/2022    BUN 22 (H) 12/28/2022    CREATININE 0.88 12/28/2022    EGFRIFNONA 60 (L) 02/08/2022    BCR 25.0 12/28/2022    ANIONGAP 13.0 12/28/2022     Lab Results   Component Value Date    INR 2.00 03/29/2023    INR 2.00 03/01/2023    INR 1.90 02/15/2023    PROTIME 24.8 (H) 02/08/2022    PROTIME 32.5 (H) 10/24/2021    PROTIME 20.4 (H) 11/09/2020     Lab Results   Component Value Date    HGBA1C 7.2 (H) 10/03/2022       ASSESSMENT/PLAN:  1. Prosthetic right knee infection - culture negative (but history of MSSA)  -not a very good surgical candidate per my prior discussions with Dr Hunter  -completed 6 weeks of IV cefazolin in February 2018 and is now on suppressive antibiotic therapy with cephalexin 500 mg PO q8h for chronic suppressive antibiotic  therapy  -RX sent in  -recent labs reviewed; no need for repeat labs today  -she will call me if any new concerning knee/infection symptoms    2. Long term use of antibiotics  -recent labs reviewed    3. Hypogammaglobulinemia  -on IVIg infusions during the winter months through the [facility]    4. CLL, stage 0  - no treatment required at this time    5. Afib on Coumadin    6. Controlled DM2 for age- A1c 7.2%    RTC 12 months or sooner if needed

## 2023-04-17 PROCEDURE — 93296 REM INTERROG EVL PM/IDS: CPT | Performed by: INTERNAL MEDICINE

## 2023-04-17 PROCEDURE — 93294 REM INTERROG EVL PM/LDLS PM: CPT | Performed by: INTERNAL MEDICINE

## 2023-04-26 ENCOUNTER — CLINICAL SUPPORT (OUTPATIENT)
Dept: ONCOLOGY | Facility: HOSPITAL | Age: 88
End: 2023-04-26
Payer: MEDICARE

## 2023-04-26 ENCOUNTER — ANTICOAGULATION VISIT (OUTPATIENT)
Dept: PHARMACY | Facility: HOSPITAL | Age: 88
End: 2023-04-26
Payer: MEDICARE

## 2023-04-26 ENCOUNTER — LAB (OUTPATIENT)
Dept: LAB | Facility: HOSPITAL | Age: 88
End: 2023-04-26
Payer: MEDICARE

## 2023-04-26 DIAGNOSIS — C91.12 CLL (CHRONIC LYMPHOID LEUKEMIA) IN RELAPSE: ICD-10-CM

## 2023-04-26 DIAGNOSIS — I48.0 PAROXYSMAL ATRIAL FIBRILLATION: Primary | ICD-10-CM

## 2023-04-26 LAB
BASOPHILS # BLD AUTO: 0.04 10*3/MM3 (ref 0–0.2)
BASOPHILS NFR BLD AUTO: 0.4 % (ref 0–1.5)
DEPRECATED RDW RBC AUTO: 51.8 FL (ref 37–54)
EOSINOPHIL # BLD AUTO: 0 10*3/MM3 (ref 0–0.4)
EOSINOPHIL NFR BLD AUTO: 0 % (ref 0.3–6.2)
ERYTHROCYTE [DISTWIDTH] IN BLOOD BY AUTOMATED COUNT: 14.7 % (ref 12.3–15.4)
HCT VFR BLD AUTO: 41.1 % (ref 34–46.6)
HGB BLD-MCNC: 13.5 G/DL (ref 12–15.9)
IMM GRANULOCYTES # BLD AUTO: 0.36 10*3/MM3 (ref 0–0.05)
IMM GRANULOCYTES NFR BLD AUTO: 3.5 % (ref 0–0.5)
INR PPP: 2.2
LYMPHOCYTES # BLD AUTO: 6.06 10*3/MM3 (ref 0.7–3.1)
LYMPHOCYTES NFR BLD AUTO: 59.3 % (ref 19.6–45.3)
MCH RBC QN AUTO: 31.8 PG (ref 26.6–33)
MCHC RBC AUTO-ENTMCNC: 32.8 G/DL (ref 31.5–35.7)
MCV RBC AUTO: 96.7 FL (ref 79–97)
MONOCYTES # BLD AUTO: 0.25 10*3/MM3 (ref 0.1–0.9)
MONOCYTES NFR BLD AUTO: 2.4 % (ref 5–12)
NEUTROPHILS NFR BLD AUTO: 3.51 10*3/MM3 (ref 1.7–7)
NEUTROPHILS NFR BLD AUTO: 34.4 % (ref 42.7–76)
NRBC BLD AUTO-RTO: 0.3 /100 WBC (ref 0–0.2)
PLATELET # BLD AUTO: 95 10*3/MM3 (ref 140–450)
PMV BLD AUTO: 11.3 FL (ref 6–12)
RBC # BLD AUTO: 4.25 10*6/MM3 (ref 3.77–5.28)
WBC NRBC COR # BLD: 10.22 10*3/MM3 (ref 3.4–10.8)

## 2023-04-26 PROCEDURE — G0463 HOSPITAL OUTPT CLINIC VISIT: HCPCS

## 2023-04-26 PROCEDURE — 85025 COMPLETE CBC W/AUTO DIFF WBC: CPT

## 2023-04-26 NOTE — PROGRESS NOTES
Anticoagulation Clinic Progress Note    Anticoagulation Summary  As of 2023    INR goal:  2.0-3.0   TTR:  67.3 % (4.4 y)   INR used for dosin.20 (2023)   Warfarin maintenance plan:  2 mg every Sun, Tue, Fri; 4 mg all other days; Starting 2023   Weekly warfarin total:  22 mg   No change documented:  Petra Oliveira RPH   Plan last modified:  Petra Oliveira RPH (2023)   Next INR check:  2023   Priority:  Maintenance   Target end date:  Indefinite    Indications    Paroxysmal atrial fibrillation [I48.0]             Anticoagulation Episode Summary     INR check location:      Preferred lab:      Send INR reminders to:  [facility] [place]    Comments:  [facility] lab beginning 20      Anticoagulation Care Providers     Provider Role Specialty Phone number    Jonah Regalado Jr., MD Referring Cardiology [phone]          Clinic Interview:  Patient Findings     Negatives:  Signs/symptoms of thrombosis, Signs/symptoms of bleeding,   Laboratory test error suspected, Change in health, Change in alcohol use,   Change in activity, Upcoming invasive procedure, Emergency department   visit, Upcoming dental procedure, Missed doses, Extra doses, Change in   medications, Change in diet/appetite, Hospital admission, Bruising, Other   complaints      Clinical Outcomes     Negatives:  Major bleeding event, Thromboembolic event,   Anticoagulation-related hospital admission, Anticoagulation-related ED   visit, Anticoagulation-related fatality        INR History:      2/15/2023     9:04 AM 3/1/2023    12:00 AM 3/1/2023     8:50 AM 3/29/2023    12:00 AM 3/29/2023     9:36 AM 2023    12:00 AM 2023     9:05 AM   Anticoagulation Monitoring   INR 1.90  2.00  2.00  2.20   INR Date 2/15/2023  3/1/2023  3/29/2023  2023   INR Goal 2.0-3.0  2.0-3.0  2.0-3.0  2.0-3.0   Trend Same  Same  Same  Same   Last Week Total 22 mg  22 mg  22 mg  22 mg   Next Week Total 22 mg  22 mg  22 mg   22 mg   Sun 2 mg  2 mg  2 mg  2 mg   Mon 4 mg  4 mg  4 mg  4 mg   Tue 2 mg  2 mg  2 mg  2 mg   Wed 4 mg  4 mg  4 mg  4 mg   Thu 4 mg  4 mg  4 mg  4 mg   Fri 2 mg  2 mg  2 mg  2 mg   Sat 4 mg  4 mg  4 mg  4 mg   Historical INR  2.00       2.00       2.20             This result is from an external source.       Plan:  1. INR is Therapeutic today- see above in Anticoagulation Summary.   Will instruct Caitlyn Longoria to Continue their warfarin regimen- see above in Anticoagulation Summary.  2. Follow up in 4 weeks  3. They have been instructed to call if any changes in medications, doses, concerns, etc. Patient expresses understanding and has no further questions at this time.    Petra Oliveira RP

## 2023-04-26 NOTE — NURSING NOTE
Patient is here for lab review with RN.  CBC reviewed, counts are stable for this patient at this time. Plt count 95, pt denies bleeding, but does bruise easily. Patient has no complaints. Copy of labs given to patient and follow up appointment reviewed. Patient is instructed to call the office with any concerns or new symptoms prior to next visit. Patient verbalized understanding and discharged in stable condition.    Lab Results   Component Value Date    WBC 10.22 04/26/2023    HGB 13.5 04/26/2023    HCT 41.1 04/26/2023    MCV 96.7 04/26/2023    PLT 95 (L) 04/26/2023

## 2023-05-24 ENCOUNTER — ANTICOAGULATION VISIT (OUTPATIENT)
Dept: PHARMACY | Facility: HOSPITAL | Age: 88
End: 2023-05-24
Payer: MEDICARE

## 2023-05-24 DIAGNOSIS — I48.0 PAROXYSMAL ATRIAL FIBRILLATION: Primary | ICD-10-CM

## 2023-05-24 LAB — INR PPP: 1.9

## 2023-05-24 NOTE — PROGRESS NOTES
Anticoagulation Clinic Progress Note    Anticoagulation Summary  As of 2023    INR goal:  2.0-3.0   TTR:  67.2 % (4.5 y)   INR used for dosin.90 (2023)   Warfarin maintenance plan:  2 mg every Sun, Tue, Fri; 4 mg all other days; Starting 2023   Weekly warfarin total:  22 mg   No change documented:  Petra Oliveira RPH   Plan last modified:  Petra Oliveira RPH (2023)   Next INR check:  2023   Priority:  Maintenance   Target end date:  Indefinite    Indications    Paroxysmal atrial fibrillation [I48.0]             Anticoagulation Episode Summary     INR check location:      Preferred lab:      Send INR reminders to:  [facility] [place]    Comments:  [facility] lab beginning 20      Anticoagulation Care Providers     Provider Role Specialty Phone number    Jonah Regalado Jr., MD Referring Cardiology [phone]          Clinic Interview:  Patient Findings     Negatives:  Signs/symptoms of thrombosis, Signs/symptoms of bleeding,   Laboratory test error suspected, Change in health, Change in alcohol use,   Change in activity, Upcoming invasive procedure, Emergency department   visit, Upcoming dental procedure, Missed doses, Extra doses, Change in   medications, Change in diet/appetite, Hospital admission, Bruising, Other   complaints      Clinical Outcomes     Negatives:  Major bleeding event, Thromboembolic event,   Anticoagulation-related hospital admission, Anticoagulation-related ED   visit, Anticoagulation-related fatality        INR History:      3/1/2023     8:50 AM 3/29/2023    12:00 AM 3/29/2023     9:36 AM 2023    12:00 AM 2023     9:05 AM 2023    12:00 AM 2023    10:08 AM   Anticoagulation Monitoring   INR 2.00  2.00  2.20  1.90   INR Date 3/1/2023  3/29/2023  2023  2023   INR Goal 2.0-3.0  2.0-3.0  2.0-3.0  2.0-3.0   Trend Same  Same  Same  Same   Last Week Total 22 mg  22 mg  22 mg  22 mg   Next Week Total 22 mg  22 mg  22 mg   22 mg   Sun 2 mg  2 mg  2 mg  2 mg   Mon 4 mg  4 mg  4 mg  4 mg   Tue 2 mg  2 mg  2 mg  2 mg   Wed 4 mg  4 mg  4 mg  4 mg   Thu 4 mg  4 mg  4 mg  4 mg   Fri 2 mg  2 mg  2 mg  2 mg   Sat 4 mg  4 mg  4 mg  4 mg   Historical INR  2.00       2.20       1.90             This result is from an external source.       Plan:  1. INR is Subtherapeutic today- see above in Anticoagulation Summary.   Will instruct Caitlyn Longoria to Continue their warfarin regimen as INR is very close to goal - see above in Anticoagulation Summary.  2. Follow up in 4 weeks  3. They have been instructed to call if any changes in medications, doses, concerns, etc. Patient expresses understanding and has no further questions at this time.    Petra Oliveira [facility]

## 2023-05-25 ENCOUNTER — TELEPHONE (OUTPATIENT)
Dept: CARDIOLOGY | Facility: CLINIC | Age: 88
End: 2023-05-25
Payer: MEDICARE

## 2023-05-25 RX ORDER — WARFARIN SODIUM 4 MG/1
TABLET ORAL
Qty: 75 TABLET | Refills: 1 | Status: SHIPPED | OUTPATIENT
Start: 2023-05-25

## 2023-05-25 NOTE — TELEPHONE ENCOUNTER
Pt is having a tooth extraction with  on 8/8/23.    She is wanting to know if she can hold her warfarin 5 days prior.    I spoke to the pt. She has an appointment on 7/19/23.  can advise on the clearance at that appointment.

## 2023-06-05 ENCOUNTER — TRANSCRIBE ORDERS (OUTPATIENT)
Dept: PHYSICAL THERAPY | Facility: HOSPITAL | Age: 88
End: 2023-06-05
Payer: MEDICARE

## 2023-06-05 DIAGNOSIS — I89.0 LYMPHEDEMA: ICD-10-CM

## 2023-06-05 DIAGNOSIS — I87.8 VENOUS STASIS: Primary | ICD-10-CM

## 2023-07-21 ENCOUNTER — TELEPHONE (OUTPATIENT)
Dept: ONCOLOGY | Facility: CLINIC | Age: 88
End: 2023-07-21
Payer: MEDICARE

## 2023-07-21 NOTE — TELEPHONE ENCOUNTER
Caller: Caitlyn Longoria    Relationship: Self    Best call back number: [phone]     What is the best time to reach you: AFTER 1 PM MONDAY OR BETWEEN 10 AND 12 ON TUESDAY.    Who are you requesting to speak with (clinical staff, provider,  specific staff member): NON-CLINICAL -SHAMEKA    What was the call regarding: PT CAN NOT DO THE APPT THAT WAS MADE ON 10/20/23. PT CAN NOT DO APPTS ANY EARLIER THAN 8:15 AM DUE TO TRANSPORTATION. MUST HAVE AN APPT AFTER 8:15 P.M.     ANY DATES IN OCTOBER WILL WORK WITH THE EXCEPTION OF OCT 10    Is it okay if the provider responds through [employer]: NO - PLEASE CALL THE PT WITH NEW APPT DATE/TIME.

## 2023-08-14 ENCOUNTER — ANTICOAGULATION VISIT (OUTPATIENT)
Dept: PHARMACY | Facility: HOSPITAL | Age: 88
End: 2023-08-14
Payer: MEDICARE

## 2023-08-14 DIAGNOSIS — I48.0 PAROXYSMAL ATRIAL FIBRILLATION: Primary | ICD-10-CM

## 2023-08-14 LAB — INR PPP: 1.5

## 2023-08-14 NOTE — PROGRESS NOTES
Anticoagulation Clinic Progress Note    Anticoagulation Summary  As of 2023      INR goal:  2.0-3.0   TTR:  66.3 % (4.7 y)   INR used for dosin.50 (2023)   Warfarin maintenance plan:  2 mg every Sun, Fri; 4 mg all other days   Weekly warfarin total:  24 mg   Plan last modified:  Petra Oliveira RPH (2023)   Next INR check:  2023   Priority:  Maintenance   Target end date:  Indefinite    Indications    Paroxysmal atrial fibrillation [I48.0]                 Anticoagulation Episode Summary       INR check location:      Preferred lab:      Send INR reminders to:   [facility]    Comments:  [facility] beginning 20          Anticoagulation Care Providers       Provider Role Specialty Phone number    Jonah Regalado Jr., MD Referring Cardiology [phone]            Clinic Interview:  Patient Findings     Positives:  Missed doses    Negatives:  Signs/symptoms of thrombosis, Signs/symptoms of bleeding,   Laboratory test error suspected, Change in health, Change in alcohol use,   Change in activity, Upcoming invasive procedure, Emergency department   visit, Upcoming dental procedure, Extra doses, Change in medications,   Change in diet/appetite, Hospital admission, Bruising, Other complaints    Comments:  patient held her warfarin for 5 days for a dental procedures,   she also reports taking 2 mg on Sun, Fri and 4 mg AOD, rather than 2 mg   MF.       Clinical Outcomes     Negatives:  Major bleeding event, Thromboembolic event,   Anticoagulation-related hospital admission, Anticoagulation-related ED   visit, Anticoagulation-related fatality    Comments:  patient held her warfarin for 5 days for a dental procedures,   she also reports taking 2 mg on Sun, Fri and 4 mg AOD, rather than 2 mg   MF.         INR History:      2023    11:50 AM 2023    12:00 AM 2023    12:11 PM 2023    12:00 AM 2023    10:22 AM 2023    12:00 AM 2023     9:08 AM    Anticoagulation Monitoring   INR 1.70  2.30  2.80  1.50   INR Date 6/23/2023 7/5/2023 7/17/2023 8/14/2023   INR Goal 2.0-3.0  2.0-3.0  2.0-3.0  2.0-3.0   Trend Up  Same  Same  Same   Last Week Total 22 mg  24 mg  24 mg  16 mg   Next Week Total 26 mg  24 mg  24 mg  26 mg   Sun 2 mg  2 mg  4 mg  2 mg (8/20, 8/27)   Mon 4 mg  4 mg  2 mg  6 mg (8/14); Otherwise 4 mg   Tue 4 mg  4 mg  4 mg  4 mg   Wed 4 mg  4 mg  4 mg  4 mg   Thu 4 mg  4 mg  4 mg  4 mg   Fri 4 mg (6/23); Otherwise 2 mg  2 mg  2 mg  2 mg   Sat 4 mg  4 mg  4 mg  4 mg   Historical INR  2.30      2.80      1.50            This result is from an external source.       Plan:  1. INR is Subtherapeutic today- see above in Anticoagulation Summary.   Will instruct Caitlyn Longoria to Increase their warfarin regimen- see above in Anticoagulation Summary.  Boost to 6 mg today then resume, rck 2 weeks   2. Follow up in 2 weeks  3.  They have been instructed to call if any changes in medications, doses, concerns, etc. Patient expresses understanding and has no further questions at this time.    Petar Oliveira [facility]

## 2023-08-21 ENCOUNTER — HOSPITAL ENCOUNTER (OUTPATIENT)
Dept: PHYSICAL THERAPY | Facility: HOSPITAL | Age: 88
Setting detail: THERAPIES SERIES
Discharge: HOME | End: 2023-08-21
Payer: MEDICARE

## 2023-08-21 DIAGNOSIS — I89.0 LYMPHEDEMA: Primary | ICD-10-CM

## 2023-08-21 DIAGNOSIS — I87.8 VENOUS STASIS: ICD-10-CM

## 2023-08-21 PROCEDURE — 97535 SELF CARE MNGMENT TRAINING: CPT

## 2023-08-21 NOTE — THERAPY DISCHARGE NOTE
Outpatient Physical Therapy Lymphedema Treatment Note/Discharge Summary  [facility]     Patient Name: Caitlyn Longoria  : 1934  MRN: [id number]  Today's Date: 2023      Visit Date: 2023    Visit Dx:    ICD-10-CM ICD-9-CM   1. Lymphedema  I89.0 457.1   2. Venous stasis  I87.8 459.81       Patient Active Problem List   Diagnosis    Neck and shoulder pain    Arthropathy of shoulder region    Carpal tunnel syndrome of left wrist    Chronic pain of both shoulders    Chronic left shoulder pain    Arthropathy of left shoulder    Dysarthria    DM II (diabetes mellitus, type II), controlled    Paroxysmal atrial fibrillation    HLD (hyperlipidemia)    Chronic bronchitis    Coronary artery disease involving native coronary artery of native heart with angina pectoris    CVA (cerebral vascular accident)    HTN (hypertension)    CKD (chronic kidney disease), stage III    Chronic combined systolic and diastolic congestive heart failure    Infection of prosthetic right knee joint    Stasis dermatitis of right lower extremity due to peripheral venous hypertension    Current use of long term anticoagulation    Morbid obesity    Acute respiratory failure with hypoxia    Rhinovirus    Asthma with acute exacerbation    Acute respiratory failure with hypoxemia    Viral pneumonia    Hypoxia    CLL (chronic lymphoid leukemia) in relapse    Dyspnea    Anticoagulated on Coumadin    Osteoporotic compression fracture of spine    Pneumonia due to gram-negative bacteria    Pneumonia due to infectious organism    Bilateral carotid artery disease    Hypogammaglobulinemia         Lymphedema       Row Name 23 1100             Subjective Pain    Able to rate subjective pain? yes  -KA      Pre-Treatment Pain Level 0  -KA         Subjective Comments    Subjective Comments Pt received aguila and compression stockings, but does not know how to put them on, is concerned that they may be too small based on the look of them.   -KA         Skin Changes/Observations    Location/Assessment Lower Extremity  -KA      Lower Extremity Conditions bilateral:;dry;fragile;other (comment)  Skin tear L anterior shin is still healing and is no longer open  -KA      Lower Extremity Color/Pigment bilateral:;red;purple  Pt with continued bruising to BLE but not as much  -KA         Lymphedema Measurements    Measurement Type(s) Quick Girth  -KA      Quick Girth Areas Lower extremities  -KA         LLE Quick Girth (cm)    Smallest ankle 29.5 cm  -KA      Largest calf 37.5 cm  -KA         RLE Quick Girth (cm)    Smallest ankle 28 cm  -KA      Largest calf 36 cm  -KA      RLE Quick Girth Total 64  -KA         Compression/Skin Care    Compression/Skin Care compression garment  -KA      Compression Garment Comments Pt brought Medi Big Aguila and 15-20 mmHG Mediven Comfort knee high stockings size V petite to clinic.  Pt practiced donning/doffing x 3 reps each side until she was able to perform independently using donning gloves and donning aguila.  Fit of garments is good.  -KA                User Key  (r) = Recorded By, (t) = Taken By, (c) = Cosigned By      Initials Name Provider Type    Ileana Newell, PT Physical Therapist                                   PT Assessment/Plan       Row Name 08/21/23 1250          PT Assessment    Functional Limitations Impaired gait;Limitations in functional capacity and performance;Performance in self-care ADL  -KA     Impairments Edema;Endurance;Gait;Impaired lymphatic circulation;Integumentary integrity;Muscle strength;Sensation;Posture;Range of motion  -KA     Assessment Comments Pt returns to clinic for training in donning/doffing of compression garments as she does not know how to use her Aguila and the included instructions are in Swedish.   Therapist verbally instructing patient during demonstration, then she practices donning/doffing x 3 reps each leg.  Efficiency improved and cues decreased with each rep, pt  independent with donning/doffing for final rep each side.  Assisted pt in ordering a second set of compression garments in same size as she likes the fit and feel of the garments, will need to replace in one year since she can now alternate pairs.  Pt to discharge to independent self management of condition at this time.  -KA     Rehab Potential Good  -KA     Patient/caregiver participated in establishment of treatment plan and goals Yes  -KA     Patient would benefit from skilled therapy intervention Yes  -KA        PT Plan    PT Plan Comments Discharge to [facility].  -KA               User Key  (r) = Recorded By, (t) = Taken By, (c) = Cosigned By      Initials Name Provider Type    Ileana Newell, PT Physical Therapist                          OP Exercises       Row Name 08/21/23 1100             Subjective Comments    Subjective Comments Pt received dwyer and compression stockings, but does not know how to put them on, is concerned that they may be too small based on the look of them.  -KA         Subjective Pain    Able to rate subjective pain? yes  -KA      Pre-Treatment Pain Level 0  -KA                User Key  (r) = Recorded By, (t) = Taken By, (c) = Cosigned By      Initials Name Provider Type    Ileana Newell, PT Physical Therapist                                   PT OP Goals       Row Name 08/21/23 1200          PT Short Term Goals    STG Date to Achieve 08/09/23  -KA     STG 1 Patient/family independent with compression garments as indicated for self-management of condition.  -KA     STG 1 Progress Met  -KA     STG 1 Progress Comments Demonstrated independence today following training with use of Medi Big Dwyer and donning gloves (has both for home)  -KA        Time Calculation    PT Goal Re-Cert Due Date 10/08/23  -LING               User Key  (r) = Recorded By, (t) = Taken By, (c) = Cosigned By      Initials Name Provider Type    Ileana Newell, PT Physical Therapist                     Therapy Education  Education Details: Dressing training performed today for new compression knee high stockings.  Therapist demonstrated several different options of donning, pt ultimately has the most success with donning using Medi Big Dwyer and donning gloves.  For doffing, she does the best by crossing her legs and using bare hands to remove garments.  Practiced 3 reps for each leg with patient demonstrating indendence on the final rep with both donning and doffing.  Encouraged her to apply garments daily, first thing in the morning, remove at night.  She should replace garments in 6 months time (or in one year if she is alternating 2 separate pairs).  Assisted pt in ordering another pair of Mediven Comfort 15-20 mmHg open toe knee high stockings in size V petite from [employer], garments shipping to her home address.  Given: Edema management  Program: New  How Provided: Verbal, Demonstration  Provided to: Patient  Level of Understanding: Verbalized, Demonstrated  55426 - PT Self Care/Mgmt Minutes: 55              Time Calculation:   Start Time: 0815  Stop Time: 0910  Time Calculation (min): 55 min  Timed Charges  64516 - PT Self Care/Mgmt Minutes: 55  Total Minutes  Timed Charges Total Minutes: 55   Total Minutes: 55     Therapy Charges for Today       Code Description Service Date Service Provider Modifiers Qty    74569948518 HC PT SELF CARE/MGMT/TRAIN EA 15 MIN 8/21/2023 Ileana Beckwith, PT GP 4                   OP PT Discharge Summary  Reason for Discharge: All goals achieved  Outcomes Achieved: Able to achieve all goals within established timeline  Discharge Destination: Home with home program  Discharge Instructions/Additional Comments: Wear compression stockings daily, remove at night.  Replace garments in one year's time as she is alternating 2 pair.  Return to lymphedema clinic as needed for re-evaluation      Ileana Beckwith, PT  8/21/2023

## 2023-08-28 ENCOUNTER — ANTICOAGULATION VISIT (OUTPATIENT)
Dept: PHARMACY | Facility: HOSPITAL | Age: 88
End: 2023-08-28
Payer: MEDICARE

## 2023-08-28 DIAGNOSIS — I48.0 PAROXYSMAL ATRIAL FIBRILLATION: Primary | ICD-10-CM

## 2023-08-28 LAB — INR PPP: 2.3

## 2023-08-28 NOTE — PROGRESS NOTES
Anticoagulation Clinic Progress Note    Anticoagulation Summary  As of 2023      INR goal:  2.0-3.0   TTR:  66.0 % (4.7 y)   INR used for dosin.30 (2023)   Warfarin maintenance plan:  2 mg every Sun, Fri; 4 mg all other days   Weekly warfarin total:  24 mg   No change documented:  Jaylin Nick PharmD   Plan last modified:  Petra Oliveira RPH (2023)   Next INR check:  2023   Priority:  Maintenance   Target end date:  Indefinite    Indications    Paroxysmal atrial fibrillation [I48.0]                 Anticoagulation Episode Summary       INR check location:      Preferred lab:      [facility]    Comments:  [facility] beginning 20          Anticoagulation Care Providers       Provider Role Specialty Phone number    Jonah Regalado Jr., MD Referring Cardiology [phone]            Clinic Interview:  No pertinent clinical findings have been reported.    INR History:      2023    12:11 PM 2023    12:00 AM 2023    10:22 AM 2023    12:00 AM 2023     9:08 AM 2023    12:00 AM 2023     9:04 AM   Anticoagulation Monitoring   INR 2.30  2.80  1.50  2.30   INR Date 2023   INR Goal 2.0-3.0  2.0-3.0  2.0-3.0  2.0-3.0   Trend Same  Same  Same  Same   Last Week Total 24 mg  24 mg  16 mg  24 mg   Next Week Total 24 mg  24 mg  26 mg  24 mg   Sun 2 mg  4 mg  2 mg (, )  2 mg   Mon 4 mg  2 mg  6 mg (); Otherwise 4 mg  4 mg   Tue 4 mg  4 mg  4 mg  4 mg   Wed 4 mg  4 mg  4 mg  4 mg   Thu 4 mg  4 mg  4 mg  4 mg   Fri 2 mg  2 mg  2 mg  2 mg   Sat 4 mg  4 mg  4 mg  4 mg   Historical INR  2.80      1.50      2.30            This result is from an external source.       Plan:  1. INR is therapeutic today- see above in Anticoagulation Summary.    Caitlyn J Raibert to continue their warfarin regimen- see above in Anticoagulation Summary.  2. Follow up in 2 weeks  3. They have been  instructed to call if any changes in medications, doses, concerns, etc. Patient expresses understanding and has no further questions at this time.    Jaylin Nick, PharmD

## 2023-08-31 ENCOUNTER — OFFICE (OUTPATIENT)
Dept: URBAN - METROPOLITAN AREA CLINIC 65 | Facility: CLINIC | Age: 88
End: 2023-08-31

## 2023-08-31 DIAGNOSIS — G47.30 SLEEP APNEA, UNSPECIFIED: ICD-10-CM

## 2023-08-31 DIAGNOSIS — K21.00 GASTRO-ESOPHAGEAL REFLUX DISEASE WITH ESOPHAGITIS, WITHOUT B: ICD-10-CM

## 2023-08-31 DIAGNOSIS — I10 ESSENTIAL (PRIMARY) HYPERTENSION: ICD-10-CM

## 2023-08-31 DIAGNOSIS — E78.00 PURE HYPERCHOLESTEROLEMIA, UNSPECIFIED: ICD-10-CM

## 2023-08-31 DIAGNOSIS — E66.3 OVERWEIGHT: ICD-10-CM

## 2023-08-31 DIAGNOSIS — K57.33 DIVERTICULITIS OF LARGE INTESTINE WITHOUT PERFORATION OR ABS: ICD-10-CM

## 2023-08-31 PROCEDURE — 99453 REM MNTR PHYSIOL PARAM SETUP: CPT | Performed by: INTERNAL MEDICINE

## 2023-08-31 PROCEDURE — 99457 RPM TX MGMT 1ST 20 MIN: CPT | Performed by: INTERNAL MEDICINE

## 2023-08-31 PROCEDURE — 99487 CPLX CHRNC CARE 1ST 60 MIN: CPT | Performed by: INTERNAL MEDICINE

## 2023-08-31 PROCEDURE — 99489 CPLX CHRNC CARE EA ADDL 30: CPT | Performed by: INTERNAL MEDICINE

## 2023-09-15 ENCOUNTER — ANTICOAGULATION VISIT (OUTPATIENT)
Dept: PHARMACY | Facility: HOSPITAL | Age: 88
End: 2023-09-15
Payer: MEDICARE

## 2023-09-15 DIAGNOSIS — I48.0 PAROXYSMAL ATRIAL FIBRILLATION: Primary | ICD-10-CM

## 2023-09-15 LAB — INR PPP: 2.6

## 2023-09-18 NOTE — PROGRESS NOTES
Anticoagulation Clinic Progress Note    Anticoagulation Summary  As of 9/15/2023      INR goal:  2.0-3.0   TTR:  66.4 % (4.8 y)   INR used for dosin.60 (9/15/2023)   Warfarin maintenance plan:  2 mg every Sun, Fri; 4 mg all other days   Weekly warfarin total:  24 mg   No change documented:  Petra Oliveira RPH   Plan last modified:  Petra Oliveira RPH (2023)   Next INR check:  10/13/2023   Priority:  Maintenance   Target end date:  Indefinite    Indications    Paroxysmal atrial fibrillation [I48.0]                 Anticoagulation Episode Summary       INR check location:      Preferred lab:      Send INR reminders to:   [facility] PO[facility]    Comments:  [facility] beginning 20          Anticoagulation Care Providers       Provider Role Specialty Phone number    Jonah Regalado Jr., MD Referring Cardiology [phone]            Clinic Interview:  Patient Findings     Negatives:  Signs/symptoms of thrombosis, Signs/symptoms of bleeding,   Laboratory test error suspected, Change in health, Change in alcohol use,   Change in activity, Upcoming invasive procedure, Emergency department   visit, Upcoming dental procedure, Missed doses, Extra doses, Change in   medications, Change in diet/appetite, Hospital admission, Bruising, Other   complaints      Clinical Outcomes     Negatives:  Major bleeding event, Thromboembolic event,   Anticoagulation-related hospital admission, Anticoagulation-related ED   visit, Anticoagulation-related fatality        INR History:      2023    10:22 AM 2023    12:00 AM 2023     9:08 AM 2023    12:00 AM 2023     9:04 AM 9/15/2023    12:00 AM 9/15/2023     9:49 AM   Anticoagulation Monitoring   INR 2.80  1.50  2.30  2.60   INR Date 2023  2023  2023  9/15/2023   INR Goal 2.0-3.0  2.0-3.0  2.0-3.0  2.0-3.0   Trend Same  Same  Same  Same   Last Week Total 24 mg  16 mg  24 mg  24 mg   Next Week Total 24 mg  26 mg  24 mg  24 mg    Sun 4 mg  2 mg (8/20, 8/27)  2 mg  2 mg   Mon 2 mg  6 mg (8/14); Otherwise 4 mg  4 mg  4 mg   Tue 4 mg  4 mg  4 mg  4 mg   Wed 4 mg  4 mg  4 mg  4 mg   Thu 4 mg  4 mg  4 mg  4 mg   Fri 2 mg  2 mg  2 mg  2 mg   Sat 4 mg  4 mg  4 mg  4 mg   Historical INR  1.50      2.30      2.60            This result is from an external source.       Plan:  1. INR is Therapeutic today- see above in Anticoagulation Summary.   Will instruct Caitlyn Longoria to Continue their warfarin regimen- see above in Anticoagulation Summary.  2. Follow up in 4 weeks  3.They have been instructed to call if any changes in medications, doses, concerns, etc. Patient expresses understanding and has no further questions at this time.    Petra Oliveira [facility]

## 2023-09-27 ENCOUNTER — TRANSCRIBE ORDERS (OUTPATIENT)
Dept: SPEECH THERAPY | Facility: HOSPITAL | Age: 88
End: 2023-09-27
Payer: MEDICARE

## 2023-09-27 DIAGNOSIS — R13.10 PROBLEMS WITH SWALLOWING AND MASTICATION: Primary | ICD-10-CM

## 2023-09-28 ENCOUNTER — OFFICE VISIT (OUTPATIENT)
Dept: WOUND CARE | Facility: HOSPITAL | Age: 88
End: 2023-09-28
Payer: MEDICARE

## 2023-09-28 PROCEDURE — 97602 WOUND(S) CARE NON-SELECTIVE: CPT

## 2023-09-30 ENCOUNTER — OFFICE (OUTPATIENT)
Dept: URBAN - METROPOLITAN AREA CLINIC 65 | Facility: CLINIC | Age: 88
End: 2023-09-30

## 2023-09-30 DIAGNOSIS — K57.33 DIVERTICULITIS OF LARGE INTESTINE WITHOUT PERFORATION OR ABS: ICD-10-CM

## 2023-09-30 DIAGNOSIS — E66.9 OBESITY, UNSPECIFIED: ICD-10-CM

## 2023-09-30 DIAGNOSIS — I10 ESSENTIAL (PRIMARY) HYPERTENSION: ICD-10-CM

## 2023-09-30 DIAGNOSIS — E78.00 PURE HYPERCHOLESTEROLEMIA, UNSPECIFIED: ICD-10-CM

## 2023-09-30 DIAGNOSIS — G47.30 SLEEP APNEA, UNSPECIFIED: ICD-10-CM

## 2023-09-30 DIAGNOSIS — K21.9 GASTRO-ESOPHAGEAL REFLUX DISEASE WITHOUT ESOPHAGITIS: ICD-10-CM

## 2023-09-30 PROCEDURE — 99454 REM MNTR PHYSIOL PARAM 16-30: CPT | Performed by: INTERNAL MEDICINE

## 2023-09-30 PROCEDURE — 99458 RPM TX MGMT EA ADDL 20 MIN: CPT | Performed by: INTERNAL MEDICINE

## 2023-09-30 PROCEDURE — 99457 RPM TX MGMT 1ST 20 MIN: CPT | Performed by: INTERNAL MEDICINE

## 2023-09-30 PROCEDURE — 99489 CPLX CHRNC CARE EA ADDL 30: CPT | Performed by: INTERNAL MEDICINE

## 2023-09-30 PROCEDURE — 99487 CPLX CHRNC CARE 1ST 60 MIN: CPT | Performed by: INTERNAL MEDICINE

## 2023-10-02 ENCOUNTER — TRANSCRIBE ORDERS (OUTPATIENT)
Dept: ADMINISTRATIVE | Facility: HOSPITAL | Age: 88
End: 2023-10-02
Payer: MEDICARE

## 2023-10-02 DIAGNOSIS — I89.0 OBLITERATION OF LYMPHATIC VESSEL: Primary | ICD-10-CM

## 2023-10-02 DIAGNOSIS — I70.248 ATHSCL NATIVE ART OF LEFT LEG W ULCER OTH PRT LOWER LEFT LEG: ICD-10-CM

## 2023-10-04 ENCOUNTER — HOSPITAL ENCOUNTER (OUTPATIENT)
Dept: SPEECH THERAPY | Facility: HOSPITAL | Age: 88
Setting detail: THERAPIES SERIES
Discharge: HOME | End: 2023-10-04
Payer: MEDICARE

## 2023-10-04 DIAGNOSIS — R13.12 OROPHARYNGEAL DYSPHAGIA: Primary | ICD-10-CM

## 2023-10-04 PROCEDURE — 92610 EVALUATE SWALLOWING FUNCTION: CPT | Performed by: SPEECH-LANGUAGE PATHOLOGIST

## 2023-10-04 NOTE — THERAPY EVALUATION
Outpatient Speech Language Pathology   Adult Swallow Initial Evaluation  [facility]     Patient Name: Caitlyn Longoria  : 1934  MRN: [id number]  Today's Date: 10/4/2023         Visit Date: 10/04/2023   Patient Active Problem List   Diagnosis    Neck and shoulder pain    Arthropathy of shoulder region    Carpal tunnel syndrome of left wrist    Chronic pain of both shoulders    Chronic left shoulder pain    Arthropathy of left shoulder    Dysarthria    DM II (diabetes mellitus, type II), controlled    Paroxysmal atrial fibrillation    HLD (hyperlipidemia)    Chronic bronchitis    Coronary artery disease involving native coronary artery of native heart with angina pectoris    CVA (cerebral vascular accident)    HTN (hypertension)    CKD (chronic kidney disease), stage III    Chronic combined systolic and diastolic congestive heart failure    Infection of prosthetic right knee joint    Stasis dermatitis of right lower extremity due to peripheral venous hypertension    Current use of long term anticoagulation    Morbid obesity    Acute respiratory failure with hypoxia    Rhinovirus    Asthma with acute exacerbation    Acute respiratory failure with hypoxemia    Viral pneumonia    Hypoxia    CLL (chronic lymphoid leukemia) in relapse    Dyspnea    Anticoagulated on Coumadin    Osteoporotic compression fracture of spine    Pneumonia due to gram-negative bacteria    Pneumonia due to infectious organism    Bilateral carotid artery disease    Hypogammaglobulinemia        Past Medical History:   Diagnosis Date    Aortic calcification     mild, on echo 2017    Aortic regurgitation     Trace    Asthma     Atrial fibrillation     CAD (coronary artery disease)     Carpal tunnel syndrome of left wrist     Chronic combined systolic and diastolic congestive heart failure     CKD (chronic kidney disease) stage 3, GFR 30-59 ml/min     COPD (chronic obstructive pulmonary disease)     Coronary artery disease involving  native coronary artery of native heart with angina pectoris     Disc degeneration, lumbar     Diverticulosis     DM type 2 (diabetes mellitus, type 2)     GERD (gastroesophageal reflux disease)     History of aneurysm     right femoral artery s/p LHC    History of blood transfusion     History of fracture     History of heart attack     History of vitamin D deficiency     Hyperlipidemia     Hypertension     Leukemia     Mild mitral regurgitation     Mitral annular calcification     12/8/2017- echo, moderate    Osteopenia     PAF (paroxysmal atrial fibrillation)     Peripheral neuropathy     Skin cancer     Left hand    Sleep apnea     bipap    SSS (sick sinus syndrome)     Stroke (cerebrum)     TIA (transient ischemic attack) 2017    Tricuspid regurgitation     Trace        Past Surgical History:   Procedure Laterality Date    BRONCHOSCOPY Bilateral 10/6/2020    Procedure: BRONCHOSCOPY with BILATERAL LUNG washings;  Surgeon: Juno Coburn MD;  Location: [facility];  Service: Pulmonary;  Laterality: Bilateral;  PRE: purulent bronchitis  POST: PURULENT BRONCHITIS    BRONCHOSCOPY Bilateral 10/9/2020    Procedure: BRONCHOSCOPY with washing;  Surgeon: Juno Coburn MD;  Location: [facility] ENDOS[facility];  Service: Pulmonary;  Laterality: Bilateral;  pre/post - mucous plug      CARDIAC CATHETERIZATION      CARDIAC ELECTROPHYSIOLOGY PROCEDURE N/A 2/7/2020    Procedure: PPM generator change - dual  medtronic;  Surgeon: Kiel Field MD;  Location: [facility] CATH INVASIVE LOCATION;  Service: Cardiology;  Laterality: N/A;    CHOLECYSTECTOMY      CORONARY STENT PLACEMENT      ENDOSCOPY N/A 9/14/2022    Procedure: ESOPHAGOGASTRODUODENOSCOPY with 54fr main dilatation;  Surgeon: Enio Cota MD;  Location: [facility];  Service: Gastroenterology;  Laterality: N/A;  pre - dysphagia  post - s/p dilatation, watermelon stomach    HERNIA REPAIR      hital hernia    HYSTERECTOMY      PACEMAKER IMPLANTATION       REPLACEMENT TOTAL KNEE Bilateral          Visit Dx:     ICD-10-CM ICD-9-CM   1. Oropharyngeal dysphagia  R13.12 787.22            OP SLP Assessment/Plan - 10/04/23 1336          SLP Assessment    Functional Problems Swallowing  -KA    Impact on Function: Swallowing Risk of aspiration;Risk of pneumonia  -KA    Clinical Impression: Swallowing Mild:;oropharyngeal phase dysphagia  -KA    Please refer to paper survey for additional self-reported information Yes  -KA    Please refer to items scanned into chart for additional diagnostic informaiton and handouts as provided by clinician Yes  -KA    Prognosis Good (comment)  -KA    Patient/caregiver participated in establishment of treatment plan and goals Yes  -KA    Patient would benefit from skilled therapy intervention Yes  -KA       SLP Plan    Frequency 1x a week  -KA    Duration pending results of VFSS  -KA    Planned CPT's? SLP SWALLOW THERAPY: 37242  -KA    Expected Duration of Therapy Session (SLP Eval) 45  -KA              User Key  (r) = Recorded By, (t) = Taken By, (c) = Cosigned By      Initials Name Provider Type    KA Sanju Avila MA,[facility]-SLP Speech and Language Pathologist                     SLP Adult Swallow Evaluation       Row Name 10/04/23 0800       Rehab Evaluation    Document Type evaluation  -KA    Subjective Information no complaints  -KA    Patient Observations alert;cooperative  -KA    Patient Effort good  -KA    Symptoms Noted During/After Treatment none  -KA       General Information    Patient Profile Reviewed yes  -KA    Pertinent History Of Current Problem Patient referred for dysphagia. Patient has c/o food feeling stuck in throat. Patient points to chest and top of sternum where food feels stuck. Patient reports occasionally she coughs and regurgitates and reswallows solids. Patient feels her dysphagia symptoms are worsening since 2019.  Patient has history of mild dysphagia per  VFSS completed on 8/6/19 and outpatient FEES on 1/23/20  also revealed mild oropharyngeal dysphagia swallow characterized by mistiming, reduced tongue base strength and decreased laryngeal elevation.No aspiration during exam. Mechanical soft no mixed consistencies and thinliquids recommended. Patient received one outpatient dysphagia therapy session on 1/31/20 until pt was hospitalized with rhinovirus and then d/c to home health. Patient medical history also includes stroke in 2007, GERD, asthma, hiatal hernia and chronic lymphocytic leukemia. Patient has not had recent lung infections.  -KA    Current Method of Nutrition regular textures;thin liquids  -KA    Precautions/Limitations, Vision WFL;for purposes of eval  -KA    Precautions/Limitations, Hearing WFL;for purposes of eval  -KA    Prior Level of Function-Communication WFL  -KA    Prior Level of Function-Swallowing other (see comments)  see hx above  -KA    Plans/Goals Discussed with patient  -KA    Barriers to Rehab none identified  -KA    Patient's Goals for Discharge --  eat without food feeling stuck  -KA       Oral Motor Structure and Function    Dentition Assessment natural, present and adequate;missing teeth  -KA    Secretion Management WNL/WFL  -KA    Mucosal Quality moist, healthy  -KA       Oral Musculature and Cranial Nerve Assessment    Oral Motor General Assessment WFL  -KA       General Eating/Swallowing Observations    Eating/Swallowing Skills self-fed  -KA    Positioning During Eating upright in chair  -KA    Utensils Used spoon;cup  -KA    Consistencies Trialed regular textures;soft to chew textures;chopped;mixed consistency;thin liquids  -KA       Clinical Swallow Eval    Clinical Swallow Evaluation Summary Patient demonstrated no overt s/s of pen/asp with thins via cup, mechanical soft mixed and regular solids. Mastication WNL. Patient denied sensation of PO feeling stuck. SLP reviewed results of previous VFSS in 2019 including watching images and verbally reviewed FEES results in 2020, both  revealing mild oropharyngeal dysphagia. SLP asked pt if she performed swallow strengthening exercises what were provided/educated in 2020, however pt stated she tried them but stopped because she felt they really did not help her. Discussed today pt c/o food feeling stuck in chest area could be more esophageal related. Patient has history of GERD, hiatal hernia and EGD with dilation. Discussed safe swallow precautions including the importance of small bites and sips, slow rate, chew thoroughly prior to swallowing and avoiding dry textures and adding extra sauces and gravy. Due to pt hx of dysphagia and pt reporting is worsening, recommend repeating VFSS and pt agreeable with plan.  -KA       SLP Evaluation Clinical Impression    SLP Swallowing Diagnosis mild;other (see comments)  hx of mild oropharyngeal dysphagia  -KA    Functional Impact risk of aspiration/pneumonia  -KA    Rehab Potential/Prognosis, Swallowing good, to achieve stated therapy goals  -    Swallow Criteria for Skilled Therapeutic Interventions Met demonstrates skilled criteria  -KA       Recommendations    Therapy Frequency (Swallow) 1 day per week  -KA    Predicted Duration Therapy Intervention (Days) until discharge  -    SLP Diet Recommendation regular textures;thin liquids;other (see comments)  avoid mixed consistencies  -KA    Recommended Diagnostics VFSS (MBS)  -KA    Recommended Precautions and Strategies upright posture during/after eating;small bites of food and sips of liquid;reflux precautions;general aspiration precautions  -    Oral Care Recommendations Oral Care BID/PRN  -KA    SLP Rec. for Method of Medication Administration meds whole;as tolerated  -    Monitor for Signs of Aspiration yes;notify SLP if any concerns  -    Anticipated Discharge Disposition (SLP) home  -              User Key  (r) = Recorded By, (t) = Taken By, (c) = Cosigned By      Initials Name Provider Type    Sanju Hamilton MA,[facility]-SLP Speech and  Language Pathologist                                   OP SLP Education       Row Name 10/04/23 1337       Education    Barriers to Learning No barriers identified  -KA    Education Provided Described results of evaluation;Patient expressed understanding of evaluation  -LING    Assessed Learning needs;Learning motivation  -LING    Learning Motivation Strong  -KA    Learning Method Explanation  -KA    Teaching Response Verbalized understanding  -LING              User Key  (r) = Recorded By, (t) = Taken By, (c) = Cosigned By      Initials Name Effective Dates    Sanju Hamilton MA,[facility]-SLP 07/11/23 -                    SLP OP Goals       Row Name 10/04/23 1300          Goal Type Needed    Goal Type Needed Dysphagia  -KA        Dysphagia Goals    Dysphagia LTG's Patient will safely consume the recommended diet without complications such as aspiration pneumonia  -KA     Status: Patient will safely consume the recommended diet without complications such as aspiration pneumonia New  -KA     Dysphagia STG's Patient will compensate for oral/pharyngeal deficits and reduce risks while eating by utilizing  compensatory strategies  -KA     Patient will compensate for oral/pharyngeal deficits and reduce risks while eating by utilizing  compensatory strategies slow rate;controlled bolus size;liquid wash;without cues  -KA     Status: Patient will compensate for oral/pharyngeal deficits and reduce risks while eating by utilizing  compensatory strategies New  -LING               User Key  (r) = Recorded By, (t) = Taken By, (c) = Cosigned By      Initials Name Provider Type    Sanju Hamilton MA,[facility]-SLP Speech and Language Pathologist                           Time Calculation:   SLP Start Time: 0930  SLP Stop Time: 1015  SLP Time Calculation (min): 45 min  Untimed Charges  64623-EP Eval Oral Pharyng Swallow Minutes: 45  Total Minutes  Untimed Charges Total Minutes: 45   Total Minutes: 45    Therapy Charges for Today       Code  Description Service Date Service Provider Modifiers Qty    [id number]  ST EVAL ORAL PHARYNG SWALLOW 3 10/4/2023 Sanju Avila MA,[facility]-SLP GN 1                     Sanju Avila MA,[facility]-SLP  10/4/2023

## 2023-10-05 ENCOUNTER — OFFICE VISIT (OUTPATIENT)
Dept: WOUND CARE | Facility: HOSPITAL | Age: 88
End: 2023-10-05
Payer: MEDICARE

## 2023-10-05 PROCEDURE — G0463 HOSPITAL OUTPT CLINIC VISIT: HCPCS

## 2023-10-06 ENCOUNTER — ANTICOAGULATION VISIT (OUTPATIENT)
Dept: PHARMACY | Facility: HOSPITAL | Age: 88
End: 2023-10-06
Payer: MEDICARE

## 2023-10-06 ENCOUNTER — HOSPITAL ENCOUNTER (OUTPATIENT)
Dept: CARDIOLOGY | Facility: HOSPITAL | Age: 88
Discharge: HOME | End: 2023-10-06
Payer: MEDICARE

## 2023-10-06 DIAGNOSIS — I89.0 OBLITERATION OF LYMPHATIC VESSEL: ICD-10-CM

## 2023-10-06 DIAGNOSIS — I48.0 PAROXYSMAL ATRIAL FIBRILLATION: Primary | ICD-10-CM

## 2023-10-06 DIAGNOSIS — I70.248 ATHSCL NATIVE ART OF LEFT LEG W ULCER OTH PRT LOWER LEFT LEG: ICD-10-CM

## 2023-10-06 LAB
BH CV LOWER ARTERIAL LEFT ABI RATIO: NORMAL
BH CV LOWER ARTERIAL LEFT DORSALIS PEDIS SYS MAX: NORMAL
BH CV LOWER ARTERIAL LEFT GREAT TOE SYS MAX: 95
BH CV LOWER ARTERIAL LEFT POST TIBIAL SYS MAX: NORMAL
BH CV LOWER ARTERIAL LEFT TBI RATIO: 0.77
BH CV LOWER ARTERIAL RIGHT ABI RATIO: NORMAL
BH CV LOWER ARTERIAL RIGHT DORSALIS PEDIS SYS MAX: NORMAL
BH CV LOWER ARTERIAL RIGHT GREAT TOE SYS MAX: 114
BH CV LOWER ARTERIAL RIGHT POST TIBIAL SYS MAX: NORMAL
BH CV LOWER ARTERIAL RIGHT TBI RATIO: 0.9
INR PPP: 3.3
UPPER ARTERIAL LEFT ARM BRACHIAL SYS MAX: 123
UPPER ARTERIAL RIGHT ARM BRACHIAL SYS MAX: 115

## 2023-10-06 PROCEDURE — 93922 UPR/L XTREMITY ART 2 LEVELS: CPT

## 2023-10-06 NOTE — PROGRESS NOTES
Anticoagulation Clinic Progress Note    Anticoagulation Summary  As of 10/6/2023      INR goal:  2.0-3.0   TTR:  66.3 % (4.8 y)   INR used for dosing:  3.30 (10/6/2023)   Warfarin maintenance plan:  2 mg every Sun, Fri; 4 mg all other days   Weekly warfarin total:  24 mg   Plan last modified:  Petra Oliveira RPH (8/14/2023)   Next INR check:  10/20/2023   Priority:  Maintenance   Target end date:  Indefinite    Indications    Paroxysmal atrial fibrillation [I48.0]                 Anticoagulation Episode Summary       INR check location:      Preferred lab:      Send INR reminders to:   [facility]    Comments:  [facility] beginning 4/22/20          Anticoagulation Care Providers       Provider Role Specialty Phone number    Jonah Regalado Jr., MD Referring Cardiology [phone]            Clinic Interview:  Patient Findings     Negatives:  Signs/symptoms of thrombosis, Signs/symptoms of bleeding,   Laboratory test error suspected, Change in health, Change in alcohol use,   Change in activity, Upcoming invasive procedure, Emergency department   visit, Upcoming dental procedure, Missed doses, Extra doses, Change in   medications, Change in diet/appetite, Hospital admission, Bruising, Other   complaints      Clinical Outcomes     Negatives:  Major bleeding event, Thromboembolic event,   Anticoagulation-related hospital admission, Anticoagulation-related ED   visit, Anticoagulation-related fatality        INR History:      8/14/2023     9:08 AM 8/28/2023    12:00 AM 8/28/2023     9:04 AM 9/15/2023    12:00 AM 9/15/2023     9:49 AM 10/6/2023    12:00 AM 10/6/2023     9:50 AM   Anticoagulation Monitoring   INR 1.50  2.30  2.60  3.30   INR Date 8/14/2023  8/28/2023  9/15/2023  10/6/2023   INR Goal 2.0-3.0  2.0-3.0  2.0-3.0  2.0-3.0   Trend Same  Same  Same  Same   Last Week Total 16 mg  24 mg  24 mg  24 mg   Next Week Total 26 mg  24 mg  24 mg  22 mg   Sun 2 mg (8/20, 8/27)  2 mg  2 mg  2 mg    Mon 6 mg (8/14); Otherwise 4 mg  4 mg  4 mg  4 mg   Tue 4 mg  4 mg  4 mg  4 mg   Wed 4 mg  4 mg  4 mg  4 mg   Thu 4 mg  4 mg  4 mg  4 mg   Fri 2 mg  2 mg  2 mg  Hold (10/6); Otherwise 2 mg   Sat 4 mg  4 mg  4 mg  4 mg   Historical INR  2.30      2.60      3.30            This result is from an external source.       Plan:  1. INR is Supratherapeutic today- see above in Anticoagulation Summary.   Will instruct Caitlyn GARRETT Longoria to Change their warfarin regimen (HOLD 2 mg today, then resume 2 mg Sun/Fri, 4 mg all other days) - see above in Anticoagulation Summary.  2. Follow up in 2 weeks.  3. They have been instructed to call if any changes in medications, doses, concerns, etc. Patient expresses understanding and has no further questions at this time.    Alexander Valiente, PharmD

## 2023-10-11 ENCOUNTER — APPOINTMENT (OUTPATIENT)
Dept: SPEECH THERAPY | Facility: HOSPITAL | Age: 88
End: 2023-10-11
Payer: MEDICARE

## 2023-10-12 ENCOUNTER — OFFICE VISIT (OUTPATIENT)
Dept: WOUND CARE | Facility: HOSPITAL | Age: 88
End: 2023-10-12
Payer: MEDICARE

## 2023-10-12 PROCEDURE — G0463 HOSPITAL OUTPT CLINIC VISIT: HCPCS

## 2023-10-17 ENCOUNTER — TRANSCRIBE ORDERS (OUTPATIENT)
Dept: ADMINISTRATIVE | Facility: HOSPITAL | Age: 88
End: 2023-10-17
Payer: MEDICARE

## 2023-10-17 DIAGNOSIS — R47.02 DYSPHASIA: Primary | ICD-10-CM

## 2023-10-18 ENCOUNTER — APPOINTMENT (OUTPATIENT)
Dept: SPEECH THERAPY | Facility: HOSPITAL | Age: 88
End: 2023-10-18
Payer: MEDICARE

## 2023-10-19 ENCOUNTER — LAB (OUTPATIENT)
Dept: LAB | Facility: HOSPITAL | Age: 88
End: 2023-10-19
Payer: MEDICARE

## 2023-10-19 ENCOUNTER — OFFICE VISIT (OUTPATIENT)
Dept: ONCOLOGY | Facility: CLINIC | Age: 88
End: 2023-10-19
Payer: MEDICARE

## 2023-10-19 ENCOUNTER — INFUSION (OUTPATIENT)
Dept: ONCOLOGY | Facility: HOSPITAL | Age: 88
End: 2023-10-19
Payer: MEDICARE

## 2023-10-19 VITALS
BODY MASS INDEX: 35.35 KG/M2 | DIASTOLIC BLOOD PRESSURE: 81 MMHG | OXYGEN SATURATION: 99 % | HEIGHT: 62 IN | TEMPERATURE: 97.3 F | HEART RATE: 82 BPM | SYSTOLIC BLOOD PRESSURE: 123 MMHG | WEIGHT: 192.1 LBS

## 2023-10-19 DIAGNOSIS — C91.12 CLL (CHRONIC LYMPHOID LEUKEMIA) IN RELAPSE: ICD-10-CM

## 2023-10-19 DIAGNOSIS — Z79.899 HIGH RISK MEDICATION USE: ICD-10-CM

## 2023-10-19 DIAGNOSIS — C91.12 CLL (CHRONIC LYMPHOID LEUKEMIA) IN RELAPSE: Primary | ICD-10-CM

## 2023-10-19 DIAGNOSIS — D80.1 HYPOGAMMAGLOBULINEMIA: ICD-10-CM

## 2023-10-19 LAB
BASOPHILS # BLD AUTO: 0.01 10*3/MM3 (ref 0–0.2)
BASOPHILS NFR BLD AUTO: 0.1 % (ref 0–1.5)
DEPRECATED RDW RBC AUTO: 55.8 FL (ref 37–54)
EOSINOPHIL # BLD AUTO: 0 10*3/MM3 (ref 0–0.4)
EOSINOPHIL NFR BLD AUTO: 0 % (ref 0.3–6.2)
ERYTHROCYTE [DISTWIDTH] IN BLOOD BY AUTOMATED COUNT: 14.6 % (ref 12.3–15.4)
HCT VFR BLD AUTO: 40.8 % (ref 34–46.6)
HGB BLD-MCNC: 12.8 G/DL (ref 12–15.9)
IGA1 MFR SER: <50 MG/DL (ref 70–400)
IGG1 SER-MCNC: 697 MG/DL (ref 700–1600)
IGM SERPL-MCNC: <25 MG/DL (ref 40–230)
IMM GRANULOCYTES # BLD AUTO: 0.03 10*3/MM3 (ref 0–0.05)
IMM GRANULOCYTES NFR BLD AUTO: 0.2 % (ref 0–0.5)
LYMPHOCYTES # BLD AUTO: 9.39 10*3/MM3 (ref 0.7–3.1)
LYMPHOCYTES NFR BLD AUTO: 71.2 % (ref 19.6–45.3)
MCH RBC QN AUTO: 32.7 PG (ref 26.6–33)
MCHC RBC AUTO-ENTMCNC: 31.4 G/DL (ref 31.5–35.7)
MCV RBC AUTO: 104.1 FL (ref 79–97)
MONOCYTES # BLD AUTO: 0.21 10*3/MM3 (ref 0.1–0.9)
MONOCYTES NFR BLD AUTO: 1.6 % (ref 5–12)
NEUTROPHILS NFR BLD AUTO: 26.9 % (ref 42.7–76)
NEUTROPHILS NFR BLD AUTO: 3.54 10*3/MM3 (ref 1.7–7)
NRBC BLD AUTO-RTO: 0 /100 WBC (ref 0–0.2)
PLATELET # BLD AUTO: 141 10*3/MM3 (ref 140–450)
PMV BLD AUTO: 9.5 FL (ref 6–12)
RBC # BLD AUTO: 3.92 10*6/MM3 (ref 3.77–5.28)
WBC NRBC COR # BLD: 13.18 10*3/MM3 (ref 3.4–10.8)

## 2023-10-19 PROCEDURE — 85025 COMPLETE CBC W/AUTO DIFF WBC: CPT

## 2023-10-19 PROCEDURE — 25010000002 IMMUNE GLOBULIN (HUMAN) 30 GM/300ML SOLUTION: Performed by: NURSE PRACTITIONER

## 2023-10-19 PROCEDURE — 63710000001 HYDROXYZINE PAMOATE PER 25 MG: Performed by: NURSE PRACTITIONER

## 2023-10-19 PROCEDURE — 82784 ASSAY IGA/IGD/IGG/IGM EACH: CPT | Performed by: NURSE PRACTITIONER

## 2023-10-19 PROCEDURE — 96366 THER/PROPH/DIAG IV INF ADDON: CPT

## 2023-10-19 PROCEDURE — 96365 THER/PROPH/DIAG IV INF INIT: CPT

## 2023-10-19 PROCEDURE — 36415 COLL VENOUS BLD VENIPUNCTURE: CPT

## 2023-10-19 PROCEDURE — A9270 NON-COVERED ITEM OR SERVICE: HCPCS | Performed by: NURSE PRACTITIONER

## 2023-10-19 PROCEDURE — 25810000003 SODIUM CHLORIDE 0.9 % SOLUTION: Performed by: NURSE PRACTITIONER

## 2023-10-19 PROCEDURE — 63710000001 ACETAMINOPHEN 325 MG TABLET: Performed by: NURSE PRACTITIONER

## 2023-10-19 RX ORDER — DIPHENHYDRAMINE HYDROCHLORIDE 50 MG/ML
50 INJECTION INTRAMUSCULAR; INTRAVENOUS AS NEEDED
Status: CANCELLED | OUTPATIENT
Start: 2023-10-19

## 2023-10-19 RX ORDER — HYDROXYZINE PAMOATE 25 MG/1
25 CAPSULE ORAL ONCE
Status: CANCELLED | OUTPATIENT
Start: 2023-10-19

## 2023-10-19 RX ORDER — ACETAMINOPHEN 325 MG/1
650 TABLET ORAL ONCE
Status: COMPLETED | OUTPATIENT
Start: 2023-10-19 | End: 2023-10-19

## 2023-10-19 RX ORDER — FAMOTIDINE 10 MG/ML
20 INJECTION, SOLUTION INTRAVENOUS AS NEEDED
Status: CANCELLED | OUTPATIENT
Start: 2023-10-19

## 2023-10-19 RX ORDER — ACETAMINOPHEN 325 MG/1
650 TABLET ORAL ONCE
Status: CANCELLED | OUTPATIENT
Start: 2023-10-19

## 2023-10-19 RX ORDER — HYDROXYZINE PAMOATE 25 MG/1
25 CAPSULE ORAL ONCE
Status: COMPLETED | OUTPATIENT
Start: 2023-10-19 | End: 2023-10-19

## 2023-10-19 RX ORDER — SODIUM CHLORIDE 9 MG/ML
250 INJECTION, SOLUTION INTRAVENOUS ONCE
Status: COMPLETED | OUTPATIENT
Start: 2023-10-19 | End: 2023-10-19

## 2023-10-19 RX ORDER — SODIUM CHLORIDE 9 MG/ML
250 INJECTION, SOLUTION INTRAVENOUS ONCE
Status: CANCELLED | OUTPATIENT
Start: 2023-10-19

## 2023-10-19 RX ADMIN — ACETAMINOPHEN 650 MG: 325 TABLET ORAL at 11:31

## 2023-10-19 RX ADMIN — HYDROXYZINE PAMOATE 25 MG: 25 CAPSULE ORAL at 11:31

## 2023-10-19 RX ADMIN — IMMUNE GLOBULIN INFUSION (HUMAN) 30 G: 100 INJECTION, SOLUTION INTRAVENOUS; SUBCUTANEOUS at 11:51

## 2023-10-19 RX ADMIN — SODIUM CHLORIDE 250 ML: 9 INJECTION, SOLUTION INTRAVENOUS at 11:52

## 2023-10-19 NOTE — PROGRESS NOTES
Subjective .     REASONS FOR FOLLOW UP:  1. chronic lymphocytic leukemia with recurrent lung infections  2.  Hypogammaglobulinemia    Referring MD:    Amarilis Suarez MD    History of Present Illness patient is an.age female with 2014 with stage 0 CLL which is never required treatments.    In 2019 she  had multiple hospitalizations for lung infections predominantly viral probably also bacterial and at her last hospitalization in October we rechecked her gammaglobulins which were low and opted to start IV IgG monthly to see if this would keep her out of the hospital.    She has continued on monthly IVIG since from October through March .  The patient  happily reports she has had almost no infection since starting IVIG.      She returns to the office today, 10/19/2023 to resume her IVIG which is administered through the fall and winter months.  She reports she is overall feeling well.  She has not had any recent hospitalizations or infections.  She has no B symptoms including appetite changes, weight loss or night sweats.    Past Medical History:   Diagnosis Date    Aortic calcification     mild, on echo 12/17/2017    Aortic regurgitation     Trace    Asthma     Atrial fibrillation     CAD (coronary artery disease)     Carpal tunnel syndrome of left wrist     Chronic combined systolic and diastolic congestive heart failure     CKD (chronic kidney disease) stage 3, GFR 30-59 ml/min     COPD (chronic obstructive pulmonary disease)     Coronary artery disease involving native coronary artery of native heart with angina pectoris     Disc degeneration, lumbar     Diverticulosis     DM type 2 (diabetes mellitus, type 2)     GERD (gastroesophageal reflux disease)     History of aneurysm     right femoral artery s/p LHC    History of blood transfusion     History of fracture     History of heart attack     History of vitamin D deficiency     Hyperlipidemia     Hypertension     Leukemia     Mild mitral regurgitation     Mitral  annular calcification     12/8/2017- echo, moderate    Osteopenia     PAF (paroxysmal atrial fibrillation)     Peripheral neuropathy     Skin cancer     Left hand    Sleep apnea     bipap    SSS (sick sinus syndrome)     Stroke (cerebrum)     TIA (transient ischemic attack) 2017    Tricuspid regurgitation     Trace       ONCOLOGIC HISTORY:    [facility] HISTORY  Patient is an 85-year-old female whom I had seen in 2014 when she was hospitalized at [facility] and was diagnosed with chronic lymphocytic leukemia.  She has not been to see me in over 4 years and is following with Dr. Suarez her primary care doctor and her white count is gone up a little higher than usual to 22,000 and she is referred back to us for evaluation.  We are doing a telephone visit because of the coronavirus pandemic and her age and susceptibility.    When I originally saw her in 2014 she was brought into the ER unresponsive in septic shock on 03/25/2014 with methicillin-resistant staphylococcus identified in her blood cultures. The patient has been on antibiotic with improvement, but had some residual kidney damage with a creatinine in the 4-range requiring hemodialysis, and was noted on admission to have an elevated white count with lymphocytosis, normal hemoglobin, but a platelet count of 95,000, and over the course of the illness her platelet count normalized and the white count had gone up into the 20,000 range with lymphocytosis. A flow cytometry was sent which is consistent with CLL, she came to our office once or twice and then stopped coming because she was so stable     She tells me now she was hospitalized recently with rhinovirus infection and has had diagnosed with asthma and is having a lot of respiratory issues but does not require oxygen.She is at the [facility] home in independent living and managing fairly well    She denies fever sweats or weight loss.  Her last white count was on 5/26/2020  Showed a white count of 18,000 with 66 lymphs  and 27 neutrophils platelet white hemoglobin is 12.6 platelet 188,000    I reassured Christopher that no treatment is indicated for this level of leukocytosis from CLL and if she has no systemic complaints I do not feel strongly about doing CAT scans at her age but I would like to physically examine her in the office in a couple of months to make sure there is no obvious adenopathy or hepatosplenomegaly.    She does have recurrent infections and we can check quantitative immunoglobulins to see how low they are as another adjunctive option to prevent recurrent infections if she has hypogammaglobulinemia    She remains on Coumadin for atrial fibrillation      Current Outpatient Medications on File Prior to Visit   Medication Sig Dispense Refill    acetaminophen (TYLENOL) 325 MG tablet Take 2 tablets by mouth Every 4 (Four) Hours As Needed for Mild Pain .      acetylcysteine (MUCOMYST) 20 % nebulizer solution Take 2 mL by nebulization 4 (Four) Times a Day.      albuterol (PROVENTIL) (2.5 MG/3ML) 0.083% nebulizer solution Take 2.5 mg by nebulization Every 4 (Four) Hours.      albuterol sulfate  (90 Base) MCG/ACT inhaler Inhale 2 puffs Every 4 (Four) Hours As Needed for Wheezing. 1 inhaler 3    amoxicillin (AMOXIL) 500 MG capsule Take 1 capsule by mouth. Before dental procedures/cleanings      Benralizumab (FASENRA SC) Inject  under the skin into the appropriate area as directed. Gets one shot a month, next shot may 13 then one every 8 weeks      Calcium Carbonate-Vitamin D (calcium-vitamin D) 500-200 MG-UNIT tablet per tablet Take 1 tablet by mouth.      carvedilol (COREG) 3.125 MG tablet Take 1 tablet by mouth 2 (Two) Times a Day. 180 tablet 2    cephalexin (KEFLEX) 500 MG capsule Take 1 capsule by mouth 3 (Three) Times a Day.      Cetirizine HCl 10 MG capsule Take  by mouth.      ciclopirox (LOPROX) 0.77 % suspension Apply  topically to the appropriate area as directed.      Diclofenac Sodium (VOLTAREN) 1 % gel gel  Apply 2 g topically to the appropriate area as directed 4 (Four) Times a Day.      fluticasone (FLONASE) 50 MCG/ACT nasal spray 1 spray into the nostril(s) as directed by provider Daily.      Fluticasone-Umeclidin-Vilant (TRELEGY) 100-62.5-25 MCG/INH inhaler Inhale 1 puff Daily. As directed      furosemide (LASIX) 20 MG tablet Take 1 tablet by mouth As Needed (For weight gain of greater than 2 pounds in 1 day or 5 pounds in 1 week). 30 tablet 11    glipizide (GLUCOTROL) 5 MG tablet Take 1 tablet by mouth. Take one half tablet by mouth daily      guaiFENesin (MUCINEX) 600 MG 12 hr tablet Take 1 tablet by mouth Every 12 (Twelve) Hours.      HyperSal 7 % nebulizer solution nebulizer solution USE 1 VIAL VIA NEBULIZER TWICE DAILY      ipratropium (ATROVENT) 0.02 % nebulizer solution Inhale 2.5 mL.      ketoconazole (NIZORAL) 2 % cream Apply 1 application  topically to the appropriate area as directed 2 (Two) Times a Day.      Loperamide HCl (IMODIUM PO) Take  by mouth Daily.      melatonin 5 MG tablet tablet Take 1 tablet by mouth Every Night.      metFORMIN ER (GLUCOPHAGE-XR) 500 MG 24 hr tablet       montelukast (SINGULAIR) 10 MG tablet Take 1 tablet by mouth Every Night.      nystatin (MYCOSTATIN) 185274 UNIT/GM cream       oxybutynin XL (DITROPAN-XL) 10 MG 24 hr tablet Take 1 tablet by mouth every night at bedtime.      polyethyl glycol-propyl glycol (SYSTANE) 0.4-0.3 % solution ophthalmic solution (artificial tears) Administer 1 drop to both eyes Every 1 (One) Hour As Needed.      rosuvastatin (CRESTOR) 20 MG tablet Take 1 tablet by mouth Every Night.      warfarin (COUMADIN) 4 MG tablet TAKE ONE-HALF (1/2) TABLET ON SUN, TUES, FRI AND TAKE 1 TABLET ALL OTHER DAYS OR AS DIRECTED 75 tablet 1     No current facility-administered medications on file prior to visit.       ALLERGIES:     Allergies   Allergen Reactions    Accupril [Quinapril Hcl] Swelling, Other (See Comments), GI Intolerance and Delirium     HA,  constipation     Ahist [Chlorpheniramine] Nausea Only, Other (See Comments) and Dizziness     Headache, Blurred vision    Clarithromycin Nausea Only, Other (See Comments) and Mental Status Change     HA, Depression, Flushing    Esomeprazole GI Intolerance    Latex Other (See Comments)     Skin breakdown    Levalbuterol Swelling    Levocetirizine Diarrhea and GI Intolerance    Lipitor [Atorvastatin] Other (See Comments) and Myalgia     Dark urine    Omeprazole Nausea Only and Other (See Comments)     HA    Pravachol [Pravastatin] Nausea Only and GI Intolerance     Bloated, Constipation, HA    Sulindac Other (See Comments) and Myalgia     HA, joint pain, bruising    Valdecoxib Irritability    Chlorcyclizine Unknown - Low Severity    Diclofenac Sodium Unknown - Low Severity    Diphenhydramine Unknown - Low Severity    Lodine [Etodolac] Unknown - Low Severity    Sulfa Antibiotics Unknown - Low Severity       Social History     Socioeconomic History    Marital status: Single   Tobacco Use    Smoking status: Never    Smokeless tobacco: Never    Tobacco comments:     caffeine use- soda   Vaping Use    Vaping Use: Never used   Substance and Sexual Activity    Alcohol use: Never    Drug use: No    Sexual activity: Defer         Cancer-related family history includes Colon cancer in her sister.     I have reviewed the patient's medical history in detail and updated the computerized patient record.    Review of Systems  I have reviewed and confirmed the accuracy of the ROS as documented by the MA/GEORGE/RN KRISTIN Koenig      Objective      Vitals:    10/19/23 1105   BP: 123/81   Pulse: 82   Temp: 97.3 °F (36.3 °C)   SpO2: 99%         10/19/2023    11:04 AM   Current Status   ECOG score 1     Pain Score    10/19/23 1105   PainSc: 0-No pain       CONSTITUTIONAL:  Vital signs reviewed.  No distress, looks comfortable.  EYES:  Conjunctiva and lids unremarkable.  PERRLA  EARS,NOSE,MOUTH,THROAT: Hearing intact.  Lips,  teeth, gums appear unremarkable.  RESPIRATORY:  Normal respiratory effort.  Lungs clear to auscultation on the right decreased breath breath sounds lower half of the left lung  CARDIOVASCULAR:  Normal S1, S2.  No murmurs rubs or gallops.  No significant lower extremity edema.  GASTROINTESTINAL: Abdomen appears unremarkable.  Nontender.  LYMPHATIC:  No cervical, supraclavicular lymphadenopathy.  SKIN:  Warm.  No rashes.  Numerous ecchymosis on her arms and legs  PSYCHIATRIC:  Normal judgment and insight.  Normal mood and affect.     I have reexamined the patient 10/19/2023 and the results are consistent with the previously documented exam. KRISTIN Koenig       RECENT LABS:  Results from last 7 days   Lab Units 10/19/23  1051   WBC 10*3/mm3 13.18*   NEUTROS ABS 10*3/mm3 3.54   HEMOGLOBIN g/dL 12.8   HEMATOCRIT % 40.8   PLATELETS 10*3/mm3 141           Assessment & Plan    1. Chronic lymphocytic leukemia  -stage 0 since 2014 untreated  White count increased to 32,000 in 12/22 after prednisone pack.  Today improved to 10.7  CLL remains stable 10/19/2023 with WBC 13.18, absolute lymphocyte count 9.39    2.  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  The patient received IVIG treatments October 2020 through March 2021.  We have held IVIG since that time and the patient is happy to report no infections aside from some urinary tract infection which she has chronically.  Otherwise she has been feeling very well.  Resume IV IgG from October through March 2022 monthly  Returns to the office today, 10/19/2023 to resume monthly IVIG which will be administered October 2023 through March 2024     3.  COPD/asthma /emphysema on home O2-recurrent respiratory infections with RSV and rhinovirus and bacteria    4.  Atrial fibrillation on Coumadin    5.  Hypertension/hypercholesterolemia/type 2 diabetes on treatment    6.  Vaccinated against coronavirus    7.   Mild anemia-currently normal    Plan:  Resume 30g IVIG for hypogammaglobulinemia.  This will be given monthly October through March 2024  There was some consideration of looking into subcutaneous formulation due to poor IV access.  The patient declines at this time and would prefer to continue through PIV  Return in 1 month and 2 months for IVIG, 30 g  MD follow-up with Dr. Larkin in 3 months    The patient is on a high risk medication requiring close monitoring for toxicity    Nickie Shen, APRN  10/19/2023

## 2023-10-20 ENCOUNTER — ANTICOAGULATION VISIT (OUTPATIENT)
Dept: PHARMACY | Facility: HOSPITAL | Age: 88
End: 2023-10-20
Payer: MEDICARE

## 2023-10-20 DIAGNOSIS — I48.0 PAROXYSMAL ATRIAL FIBRILLATION: Primary | ICD-10-CM

## 2023-10-20 LAB — INR PPP: 2.9

## 2023-10-20 NOTE — PROGRESS NOTES
Anticoagulation Clinic Progress Note    Anticoagulation Summary  As of 10/20/2023      INR goal:  2.0-3.0   TTR:  66.0% (4.9 y)   INR used for dosin.90 (10/20/2023)   Warfarin maintenance plan:  2 mg every Sun, Fri; 4 mg all other days   Weekly warfarin total:  24 mg   No change documented:  Jaylin Nick PharmD   Plan last modified:  Petra Oliveira RPH (2023)   Next INR check:  11/3/2023   Priority:  Maintenance   Target end date:  Indefinite    Indications    Paroxysmal atrial fibrillation [I48.0]                 Anticoagulation Episode Summary       INR check location:      Preferred lab:      Send INR reminders to:   [facility]    Comments:  [facility] beginning 20          Anticoagulation Care Providers       Provider Role Specialty Phone number    Jonah Regalado Jr., MD Referring Cardiology [phone]            Clinic Interview:  Patient Findings     Negatives:  Signs/symptoms of thrombosis, Signs/symptoms of bleeding,   Laboratory test error suspected, Change in health, Change in alcohol use,   Change in activity, Upcoming invasive procedure, Emergency department   visit, Upcoming dental procedure, Missed doses, Extra doses, Change in   medications, Change in diet/appetite, Hospital admission, Bruising, Other   complaints      Clinical Outcomes     Negatives:  Major bleeding event, Thromboembolic event,   Anticoagulation-related hospital admission, Anticoagulation-related ED   visit, Anticoagulation-related fatality        INR History:      2023     9:04 AM 9/15/2023    12:00 AM 9/15/2023     9:49 AM 10/6/2023    12:00 AM 10/6/2023     9:50 AM 10/20/2023    12:00 AM 10/20/2023    10:01 AM   Anticoagulation Monitoring   INR 2.30  2.60  3.30  2.90   INR Date 2023  9/15/2023  10/6/2023  10/20/2023   INR Goal 2.0-3.0  2.0-3.0  2.0-3.0  2.0-3.0   Trend Same  Same  Same  Same   Last Week Total 24 mg  24 mg  24 mg  24 mg   Next Week Total 24 mg  24 mg  22 mg   24 mg   Sun 2 mg  2 mg  2 mg  2 mg   Mon 4 mg  4 mg  4 mg  4 mg   Tue 4 mg  4 mg  4 mg  4 mg   Wed 4 mg  4 mg  4 mg  4 mg   Thu 4 mg  4 mg  4 mg  4 mg   Fri 2 mg  2 mg  Hold (10/6); Otherwise 2 mg  2 mg   Sat 4 mg  4 mg  4 mg  4 mg   Historical INR  2.60      3.30      2.90            This result is from an external source.       Plan:  1. INR is Therapeutic today- see above in Anticoagulation Summary.   Will instruct Caitlyn GARRETT Longoria to Continue their warfarin regimen- see above in Anticoagulation Summary.  2. Follow up in 2 weeks  3. Secure voicemail with instructions and follow up provided.They have been instructed to call if any changes in medications, doses, concerns, etc. Patient expresses understanding and has no further questions at this time.    Jaylin Nick, PharmD

## 2023-10-23 ENCOUNTER — TRANSCRIBE ORDERS (OUTPATIENT)
Dept: ADMINISTRATIVE | Facility: HOSPITAL | Age: 88
End: 2023-10-23
Payer: MEDICARE

## 2023-10-23 ENCOUNTER — OFFICE VISIT (OUTPATIENT)
Dept: WOUND CARE | Facility: HOSPITAL | Age: 88
End: 2023-10-23
Payer: MEDICARE

## 2023-10-23 DIAGNOSIS — R13.12 OROPHARYNGEAL DYSPHAGIA: Primary | ICD-10-CM

## 2023-10-23 PROCEDURE — G0463 HOSPITAL OUTPT CLINIC VISIT: HCPCS

## 2023-10-25 ENCOUNTER — APPOINTMENT (OUTPATIENT)
Dept: SPEECH THERAPY | Facility: HOSPITAL | Age: 88
End: 2023-10-25
Payer: MEDICARE

## 2023-10-25 ENCOUNTER — HOSPITAL ENCOUNTER (OUTPATIENT)
Dept: GENERAL RADIOLOGY | Facility: HOSPITAL | Age: 88
Discharge: HOME | End: 2023-10-25
Payer: MEDICARE

## 2023-10-25 DIAGNOSIS — R13.12 OROPHARYNGEAL DYSPHAGIA: ICD-10-CM

## 2023-10-25 PROCEDURE — 92611 MOTION FLUOROSCOPY/SWALLOW: CPT | Performed by: SPEECH-LANGUAGE PATHOLOGIST

## 2023-10-25 PROCEDURE — 63710000001 BARIUM SULFATE 60 % CREAM: Performed by: INTERNAL MEDICINE

## 2023-10-25 PROCEDURE — A9270 NON-COVERED ITEM OR SERVICE: HCPCS | Performed by: INTERNAL MEDICINE

## 2023-10-25 PROCEDURE — 74230 X-RAY XM SWLNG FUNCJ C+: CPT

## 2023-10-25 PROCEDURE — 63710000001 BARIUM SULFATE 40 % RECONSTITUTED SUSPENSION: Performed by: INTERNAL MEDICINE

## 2023-10-25 PROCEDURE — 63710000001 BARIUM SULFATE 98 % RECONSTITUTED SUSPENSION: Performed by: INTERNAL MEDICINE

## 2023-10-25 RX ADMIN — BARIUM SULFATE 55 ML: 0.81 POWDER, FOR SUSPENSION ORAL at 09:55

## 2023-10-25 RX ADMIN — BARIUM SULFATE 4 ML: 980 POWDER, FOR SUSPENSION ORAL at 09:55

## 2023-10-25 RX ADMIN — BARIUM SULFATE 1 TEASPOON(S): 0.6 CREAM ORAL at 09:55

## 2023-10-25 NOTE — MBS/VFSS/FEES
Outpatient Speech Language Pathology   Adult Swallow Initial Evaluation  [facility]     Patient Name: Caitlyn Longoria  : 1934  MRN: [id number]  Today's Date: 10/25/2023         Visit Date: 10/25/2023   Patient Active Problem List   Diagnosis    Neck and shoulder pain    Arthropathy of shoulder region    Carpal tunnel syndrome of left wrist    Chronic pain of both shoulders    Chronic left shoulder pain    Arthropathy of left shoulder    Dysarthria    DM II (diabetes mellitus, type II), controlled    Paroxysmal atrial fibrillation    HLD (hyperlipidemia)    Chronic bronchitis    Coronary artery disease involving native coronary artery of native heart with angina pectoris    CVA (cerebral vascular accident)    HTN (hypertension)    CKD (chronic kidney disease), stage III    Chronic combined systolic and diastolic congestive heart failure    Infection of prosthetic right knee joint    Stasis dermatitis of right lower extremity due to peripheral venous hypertension    Current use of long term anticoagulation    Morbid obesity    Acute respiratory failure with hypoxia    Rhinovirus    Asthma with acute exacerbation    Acute respiratory failure with hypoxemia    Viral pneumonia    Hypoxia    CLL (chronic lymphoid leukemia) in relapse    Dyspnea    Anticoagulated on Coumadin    Osteoporotic compression fracture of spine    Pneumonia due to gram-negative bacteria    Pneumonia due to infectious organism    Bilateral carotid artery disease    Hypogammaglobulinemia        Past Medical History:   Diagnosis Date    Aortic calcification     mild, on echo 2017    Aortic regurgitation     Trace    Asthma     Atrial fibrillation     CAD (coronary artery disease)     Carpal tunnel syndrome of left wrist     Chronic combined systolic and diastolic congestive heart failure     CKD (chronic kidney disease) stage 3, GFR 30-59 ml/min     COPD (chronic obstructive pulmonary disease)     Coronary artery disease involving  native coronary artery of native heart with angina pectoris     Disc degeneration, lumbar     Diverticulosis     DM type 2 (diabetes mellitus, type 2)     GERD (gastroesophageal reflux disease)     History of aneurysm     right femoral artery s/p LHC    History of blood transfusion     History of fracture     History of heart attack     History of vitamin D deficiency     Hyperlipidemia     Hypertension     Leukemia     Mild mitral regurgitation     Mitral annular calcification     12/8/2017- echo, moderate    Osteopenia     PAF (paroxysmal atrial fibrillation)     Peripheral neuropathy     Skin cancer     Left hand    Sleep apnea     bipap    SSS (sick sinus syndrome)     Stroke (cerebrum)     TIA (transient ischemic attack) 2017    Tricuspid regurgitation     Trace        Past Surgical History:   Procedure Laterality Date    BRONCHOSCOPY Bilateral 10/6/2020    Procedure: BRONCHOSCOPY with BILATERAL LUNG washings;  Surgeon: Juno Coburn MD;  Location: [facility] ENDOSCOPY;  Service: Pulmonary;  Laterality: Bilateral;  PRE: purulent bronchitis  POST: PURULENT BRONCHITIS    BRONCHOSCOPY Bilateral 10/9/2020    Procedure: BRONCHOSCOPY with washing;  Surgeon: Juno Coburn MD;  Location: [facility] ENDOSCOPY;  Service: Pulmonary;  Laterality: Bilateral;  pre/post - mucous plug      CARDIAC CATHETERIZATION      CARDIAC ELECTROPHYSIOLOGY PROCEDURE N/A 2/7/2020    Procedure: PPM generator change - dual  medtronic;  Surgeon: Kiel Field MD;  Location: [facility] CATH INVASIVE LOCATION;  Service: Cardiology;  Laterality: N/A;    CHOLECYSTECTOMY      CORONARY STENT PLACEMENT      ENDOSCOPY N/A 9/14/2022    Procedure: ESOPHAGOGASTRODUODENOSCOPY with 54fr main dilatation;  Surgeon: Enoi Cota MD;  Location: [facility] ENDOSCOPY;  Service: Gastroenterology;  Laterality: N/A;  pre - dysphagia  post - s/p dilatation, watermelon stomach    HERNIA REPAIR      hital hernia    HYSTERECTOMY      PACEMAKER IMPLANTATION       REPLACEMENT TOTAL KNEE Bilateral          Visit Dx:     ICD-10-CM ICD-9-CM   1. Oropharyngeal dysphagia  R13.12 787.22            OP SLP Assessment/Plan - 10/25/23 1055          SLP Assessment    Functional Problems Swallowing  -KA    Clinical Impression: Swallowing WNL;Minimal:  -KA              User Key  (r) = Recorded By, (t) = Taken By, (c) = Cosigned By      Initials Name Provider Type    Sanju Hamilton MA,CCC-SLP Speech and Language Pathologist                     SLP Adult Swallow Evaluation       Row Name 10/25/23 1000       Rehab Evaluation    Document Type evaluation  -KA    Subjective Information no complaints  -KA    Patient Observations alert;cooperative  -KA    Patient Effort good  -KA    Symptoms Noted During/After Treatment none  -KA       General Information    Patient Profile Reviewed yes  -KA    Pertinent History Of Current Problem Please refer to initial evaluation on 10/4 for full history. VFSS completed to r/o dysphagia  -KA    Current Method of Nutrition regular textures;thin liquids  -KA    Precautions/Limitations, Vision WFL;for purposes of eval  -KA    Precautions/Limitations, Hearing WFL;for purposes of eval  -KA       MBS/VFSS    Utensils Used spoon;cup;straw  -KA    Consistencies Trialed regular textures;soft to chew textures;chopped;mixed consistency;pureed;thin liquids  -KA       MBS/VFSS Interpretation    VFSS Summary Radiologist Dr Crump present. Overall patient demonstrates minimal oropharyngeal dysphagia and mostly age appropriate swallow. Patient demonstrated trace transient penetration with thin liquids. No penetration with all solid consistencies. With thin liqiuds pt demonstrates trace pharyngeal residue (clears with cued multiple swallow) and demonstrated spillover into laryngeal vestibule x1 out of approximately 7 trials after the swallow with trace silent aspiration. Cued cough strong and expelled material. Patient demonstrated trace BOT and vallaculae residue with  puree.  Piecemeal deglutition with solids at times. Esophageal screen revealed mild intraesophageal reflux and dysmotility.  -KA       SLP Communication to Radiology    Summary Statement Radiologist Dr Crump present. Overall patient demonstrates minimal oropharyngeal dysphagia and mostly age appropriate swallow. Patient demonstrated trace transient penetration with thin liquids. No penetration with all solid consistencies. With thin liqiuds pt demonstrates trace pharyngeal residue (clears with cued multiple swallow) and demonstrated spillover into laryngeal vestibule x1 out of approximately 7 trials after the swallow with trace silent aspiration. Cued cough strong and expelled material. Patient demonstrated trace BOT and vallaculae residue with puree. Piecemeal deglutition with solids at times. Esophageal screen revealed mild intraesophageal reflux and dysmotility.  -KA       SLP Evaluation Clinical Impression    SLP Swallowing Diagnosis swallow WFL/no suspected pharyngeal impairment;other (see comments)  minimal, mostly age appropriate  -KA    Functional Impact no impact on function  -KA    Swallow Criteria for Skilled Therapeutic Interventions Met no problems identified which require skilled intervention  -KA       Recommendations    SLP Diet Recommendation regular textures;thin liquids  -KA    Recommended Precautions and Strategies upright posture during/after eating;small bites of food and sips of liquid;multiple swallows per sip of liquid;multiple swallows per bite of food;volitional throat clear  -KA    Oral Care Recommendations Oral Care BID/PRN  -KA    SLP Rec. for Method of Medication Administration meds whole;as tolerated  -KA    Monitor for Signs of Aspiration yes;notify SLP if any concerns  -KA    Anticipated Discharge Disposition (SLP) home  -KA    Demonstrates Need for Referral to Another Service other (see comments)  f/u with GI due to patient ongoing c/o food feels stuck and coughing senior care or end  of the meal.  -              User Key  (r) = Recorded By, (t) = Taken By, (c) = Cosigned By      Initials Name Provider Type    Sanju Hamilton MA,CCC-SLP Speech and Language Pathologist                                   OP SLP Education       Row Name 10/25/23 1056       Education    Barriers to Learning No barriers identified  -    Education Provided Described results of evaluation;Patient expressed understanding of evaluation  -LING    Assessed Learning needs;Learning motivation  -    Learning Motivation Strong  -    Learning Method Teach back;Written materials;Explanation  -    Teaching Response Verbalized understanding;Demonstrated understanding  -    Education Comments SLP completed detailed education with patient on the results including showing images. Reviewed risks with larger/repetitive sips of thins with trace asp x1 after the swallow from trace residue. Discussed importance of small bites and sips, one sip at a time, chew thoroughly and periodic throat clear and multiple swallows. Patient able to verbalize back safe swallow precautions and handouts provided. Patient continues to report she coughs occasionally when eating and reports it mostly happens longterm or end of the meal. She also reports sensation of food feeling stuck also happens end of the meal. Recommend she f/u with GI. Handouts also provided three swallow exercises which pt demonstrated adequately. All education completed and pt voiced understanding.  -LING              User Key  (r) = Recorded By, (t) = Taken By, (c) = Cosigned By      Initials Name Effective Dates    Sanju Hamilton MA,CCC-SLP 07/11/23 -                              Time Calculation:   SLP Start Time: 0930  Untimed Charges  61261-QV Motion Fluoro Eval Swallow Minutes: 90  Total Minutes  Untimed Charges Total Minutes: 90   Total Minutes: 90    Therapy Charges for Today       Code Description Service Date Service Provider Modifiers Qty    [id number] [facility]  MOTION FLUORO EVAL SWALLOW 6 10/25/2023 Sanju Avila MA,CCC-SLP GN 1                     Sanju Avila MA,MANFRED-SLP  10/25/2023

## 2023-10-26 DIAGNOSIS — I48.92 ATRIAL FIBRILLATION AND FLUTTER: ICD-10-CM

## 2023-10-26 DIAGNOSIS — I48.91 ATRIAL FIBRILLATION AND FLUTTER: ICD-10-CM

## 2023-10-26 RX ORDER — CARVEDILOL 3.12 MG/1
3.12 TABLET ORAL 2 TIMES DAILY
Qty: 180 TABLET | Refills: 2 | Status: SHIPPED | OUTPATIENT
Start: 2023-10-26

## 2023-10-31 ENCOUNTER — OFFICE (OUTPATIENT)
Dept: URBAN - METROPOLITAN AREA CLINIC 65 | Facility: CLINIC | Age: 88
End: 2023-10-31

## 2023-10-31 DIAGNOSIS — G47.30 SLEEP APNEA, UNSPECIFIED: ICD-10-CM

## 2023-10-31 DIAGNOSIS — K57.33 DIVERTICULITIS OF LARGE INTESTINE WITHOUT PERFORATION OR ABS: ICD-10-CM

## 2023-10-31 DIAGNOSIS — E78.00 PURE HYPERCHOLESTEROLEMIA, UNSPECIFIED: ICD-10-CM

## 2023-10-31 DIAGNOSIS — E66.9 OBESITY, UNSPECIFIED: ICD-10-CM

## 2023-10-31 DIAGNOSIS — K21.9 GASTRO-ESOPHAGEAL REFLUX DISEASE WITHOUT ESOPHAGITIS: ICD-10-CM

## 2023-10-31 DIAGNOSIS — I10 ESSENTIAL (PRIMARY) HYPERTENSION: ICD-10-CM

## 2023-10-31 PROCEDURE — 99454 REM MNTR PHYSIOL PARAM 16-30: CPT | Performed by: INTERNAL MEDICINE

## 2023-10-31 PROCEDURE — 99457 RPM TX MGMT 1ST 20 MIN: CPT | Performed by: INTERNAL MEDICINE

## 2023-10-31 PROCEDURE — 99490 CHRNC CARE MGMT STAFF 1ST 20: CPT | Performed by: INTERNAL MEDICINE

## 2023-10-31 PROCEDURE — 99439 CHRNC CARE MGMT STAF EA ADDL: CPT | Performed by: INTERNAL MEDICINE

## 2023-11-03 ENCOUNTER — ANTICOAGULATION VISIT (OUTPATIENT)
Dept: PHARMACY | Facility: HOSPITAL | Age: 88
End: 2023-11-03
Payer: MEDICARE

## 2023-11-03 DIAGNOSIS — I48.0 PAROXYSMAL ATRIAL FIBRILLATION: Primary | ICD-10-CM

## 2023-11-03 LAB — INR PPP: 2.6

## 2023-11-03 NOTE — PROGRESS NOTES
Anticoagulation Clinic Progress Note    Anticoagulation Summary  As of 11/3/2023      INR goal:  2.0-3.0   TTR:  66.2% (4.9 y)   INR used for dosin.60 (11/3/2023)   Warfarin maintenance plan:  2 mg every Sun, Fri; 4 mg all other days   Weekly warfarin total:  24 mg   No change documented:  Petra Oliveira RPH   Plan last modified:  Petra Oliveira RPH (2023)   Next INR check:  2023   Priority:  Maintenance   Target end date:  Indefinite    Indications    Paroxysmal atrial fibrillation [I48.0]                 Anticoagulation Episode Summary       INR check location:      Preferred lab:      Send INR reminders to:   [facility]    Comments:  [facility] beginning 20          Anticoagulation Care Providers       Provider Role Specialty Phone number    Jonah Regalado Jr., MD Referring Cardiology [phone]            Clinic Interview:  Patient Findings     Negatives:  Signs/symptoms of thrombosis, Signs/symptoms of bleeding,   Laboratory test error suspected, Change in health, Change in alcohol use,   Change in activity, Upcoming invasive procedure, Emergency department   visit, Upcoming dental procedure, Missed doses, Extra doses, Change in   medications, Change in diet/appetite, Hospital admission, Bruising, Other   complaints      Clinical Outcomes     Negatives:  Major bleeding event, Thromboembolic event,   Anticoagulation-related hospital admission, Anticoagulation-related ED   visit, Anticoagulation-related fatality        INR History:      9/15/2023     9:49 AM 10/6/2023    12:00 AM 10/6/2023     9:50 AM 10/20/2023    12:00 AM 10/20/2023    10:01 AM 11/3/2023    12:00 AM 11/3/2023     9:14 AM   Anticoagulation Monitoring   INR 2.60  3.30  2.90  2.60   INR Date 9/15/2023  10/6/2023  10/20/2023  11/3/2023   INR Goal 2.0-3.0  2.0-3.0  2.0-3.0  2.0-3.0   Trend Same  Same  Same  Same   Last Week Total 24 mg  24 mg  24 mg  24 mg   Next Week Total 24 mg  22 mg  24 mg  24 mg    Sun 2 mg  2 mg  2 mg  2 mg   Mon 4 mg  4 mg  4 mg  4 mg   Tue 4 mg  4 mg  4 mg  4 mg   Wed 4 mg  4 mg  4 mg  4 mg   Thu 4 mg  4 mg  4 mg  4 mg   Fri 2 mg  Hold (10/6); Otherwise 2 mg  2 mg  2 mg   Sat 4 mg  4 mg  4 mg  4 mg   Historical INR  3.30      2.90      2.60            This result is from an external source.       Plan:  1. INR is Therapeutic today- see above in Anticoagulation Summary.   Will instruct Caitlyn GARRETT Longoria to Continue their warfarin regimen- see above in Anticoagulation Summary.  2. Follow up in 4 weeks  3. They have been instructed to call if any changes in medications, doses, concerns, etc. Patient expresses understanding and has no further questions at this time.    Petra Oliveira [facility]

## 2023-11-16 ENCOUNTER — LAB (OUTPATIENT)
Dept: LAB | Facility: HOSPITAL | Age: 88
End: 2023-11-16
Payer: MEDICARE

## 2023-11-16 ENCOUNTER — INFUSION (OUTPATIENT)
Dept: ONCOLOGY | Facility: HOSPITAL | Age: 88
End: 2023-11-16
Payer: MEDICARE

## 2023-11-16 VITALS
SYSTOLIC BLOOD PRESSURE: 139 MMHG | TEMPERATURE: 97.1 F | DIASTOLIC BLOOD PRESSURE: 74 MMHG | RESPIRATION RATE: 16 BRPM | BODY MASS INDEX: 34.71 KG/M2 | OXYGEN SATURATION: 96 % | HEART RATE: 60 BPM | WEIGHT: 189.8 LBS

## 2023-11-16 DIAGNOSIS — C91.12 CLL (CHRONIC LYMPHOID LEUKEMIA) IN RELAPSE: Primary | ICD-10-CM

## 2023-11-16 LAB
BASOPHILS # BLD AUTO: 0.01 10*3/MM3 (ref 0–0.2)
BASOPHILS NFR BLD AUTO: 0.1 % (ref 0–1.5)
DEPRECATED RDW RBC AUTO: 54.3 FL (ref 37–54)
EOSINOPHIL # BLD AUTO: 0 10*3/MM3 (ref 0–0.4)
EOSINOPHIL NFR BLD AUTO: 0 % (ref 0.3–6.2)
ERYTHROCYTE [DISTWIDTH] IN BLOOD BY AUTOMATED COUNT: 14.3 % (ref 12.3–15.4)
HCT VFR BLD AUTO: 41.9 % (ref 34–46.6)
HGB BLD-MCNC: 13.2 G/DL (ref 12–15.9)
IGA1 MFR SER: <50 MG/DL (ref 70–400)
IGG1 SER-MCNC: 939 MG/DL (ref 700–1600)
IGM SERPL-MCNC: 26 MG/DL (ref 40–230)
IMM GRANULOCYTES # BLD AUTO: 0.04 10*3/MM3 (ref 0–0.05)
IMM GRANULOCYTES NFR BLD AUTO: 0.3 % (ref 0–0.5)
LYMPHOCYTES # BLD AUTO: 10.68 10*3/MM3 (ref 0.7–3.1)
LYMPHOCYTES NFR BLD AUTO: 73.7 % (ref 19.6–45.3)
MCH RBC QN AUTO: 32 PG (ref 26.6–33)
MCHC RBC AUTO-ENTMCNC: 31.5 G/DL (ref 31.5–35.7)
MCV RBC AUTO: 101.7 FL (ref 79–97)
MONOCYTES # BLD AUTO: 0.24 10*3/MM3 (ref 0.1–0.9)
MONOCYTES NFR BLD AUTO: 1.7 % (ref 5–12)
NEUTROPHILS NFR BLD AUTO: 24.2 % (ref 42.7–76)
NEUTROPHILS NFR BLD AUTO: 3.53 10*3/MM3 (ref 1.7–7)
NRBC BLD AUTO-RTO: 0 /100 WBC (ref 0–0.2)
PLATELET # BLD AUTO: 118 10*3/MM3 (ref 140–450)
PMV BLD AUTO: 9.9 FL (ref 6–12)
RBC # BLD AUTO: 4.12 10*6/MM3 (ref 3.77–5.28)
WBC NRBC COR # BLD: 14.5 10*3/MM3 (ref 3.4–10.8)

## 2023-11-16 PROCEDURE — A9270 NON-COVERED ITEM OR SERVICE: HCPCS | Performed by: INTERNAL MEDICINE

## 2023-11-16 PROCEDURE — 25010000002 IMMUNE GLOBULIN (HUMAN) 30 GM/300ML SOLUTION: Performed by: INTERNAL MEDICINE

## 2023-11-16 PROCEDURE — 63710000001 HYDROXYZINE PAMOATE PER 25 MG: Performed by: INTERNAL MEDICINE

## 2023-11-16 PROCEDURE — 96366 THER/PROPH/DIAG IV INF ADDON: CPT

## 2023-11-16 PROCEDURE — 25810000003 SODIUM CHLORIDE 0.9 % SOLUTION: Performed by: INTERNAL MEDICINE

## 2023-11-16 PROCEDURE — 85025 COMPLETE CBC W/AUTO DIFF WBC: CPT

## 2023-11-16 PROCEDURE — 96365 THER/PROPH/DIAG IV INF INIT: CPT

## 2023-11-16 PROCEDURE — 82784 ASSAY IGA/IGD/IGG/IGM EACH: CPT | Performed by: INTERNAL MEDICINE

## 2023-11-16 PROCEDURE — 63710000001 ACETAMINOPHEN 325 MG TABLET: Performed by: INTERNAL MEDICINE

## 2023-11-16 RX ORDER — ACETAMINOPHEN 325 MG/1
650 TABLET ORAL ONCE
Status: COMPLETED | OUTPATIENT
Start: 2023-11-16 | End: 2023-11-16

## 2023-11-16 RX ORDER — SODIUM CHLORIDE 9 MG/ML
250 INJECTION, SOLUTION INTRAVENOUS ONCE
Status: COMPLETED | OUTPATIENT
Start: 2023-11-16 | End: 2023-11-16

## 2023-11-16 RX ORDER — DIPHENHYDRAMINE HYDROCHLORIDE 50 MG/ML
50 INJECTION INTRAMUSCULAR; INTRAVENOUS AS NEEDED
Status: CANCELLED | OUTPATIENT
Start: 2023-11-16

## 2023-11-16 RX ORDER — HYDROXYZINE PAMOATE 25 MG/1
25 CAPSULE ORAL ONCE
Status: COMPLETED | OUTPATIENT
Start: 2023-11-16 | End: 2023-11-16

## 2023-11-16 RX ORDER — MEPERIDINE HYDROCHLORIDE 50 MG/ML
25 INJECTION INTRAMUSCULAR; INTRAVENOUS; SUBCUTANEOUS
Status: CANCELLED | OUTPATIENT
Start: 2023-11-16 | End: 2023-11-17

## 2023-11-16 RX ORDER — FAMOTIDINE 10 MG/ML
20 INJECTION, SOLUTION INTRAVENOUS AS NEEDED
Status: CANCELLED | OUTPATIENT
Start: 2023-11-16

## 2023-11-16 RX ADMIN — HYDROXYZINE PAMOATE 25 MG: 25 CAPSULE ORAL at 08:57

## 2023-11-16 RX ADMIN — IMMUNE GLOBULIN INFUSION (HUMAN) 30 G: 100 INJECTION, SOLUTION INTRAVENOUS; SUBCUTANEOUS at 09:43

## 2023-11-16 RX ADMIN — SODIUM CHLORIDE 250 ML: 9 INJECTION, SOLUTION INTRAVENOUS at 09:43

## 2023-11-16 RX ADMIN — ACETAMINOPHEN 650 MG: 325 TABLET ORAL at 08:58

## 2023-11-21 RX ORDER — WARFARIN SODIUM 4 MG/1
TABLET ORAL
Qty: 80 TABLET | Refills: 1 | Status: SHIPPED | OUTPATIENT
Start: 2023-11-21

## 2023-11-30 ENCOUNTER — OFFICE (OUTPATIENT)
Dept: URBAN - METROPOLITAN AREA CLINIC 65 | Facility: CLINIC | Age: 88
End: 2023-11-30

## 2023-11-30 DIAGNOSIS — E66.9 OBESITY, UNSPECIFIED: ICD-10-CM

## 2023-11-30 DIAGNOSIS — K21.9 GASTRO-ESOPHAGEAL REFLUX DISEASE WITHOUT ESOPHAGITIS: ICD-10-CM

## 2023-11-30 DIAGNOSIS — I10 ESSENTIAL (PRIMARY) HYPERTENSION: ICD-10-CM

## 2023-11-30 DIAGNOSIS — K57.33 DIVERTICULITIS OF LARGE INTESTINE WITHOUT PERFORATION OR ABS: ICD-10-CM

## 2023-11-30 DIAGNOSIS — E78.00 PURE HYPERCHOLESTEROLEMIA, UNSPECIFIED: ICD-10-CM

## 2023-11-30 DIAGNOSIS — G47.30 SLEEP APNEA, UNSPECIFIED: ICD-10-CM

## 2023-11-30 PROCEDURE — 99490 CHRNC CARE MGMT STAFF 1ST 20: CPT | Performed by: INTERNAL MEDICINE

## 2023-11-30 PROCEDURE — 99454 REM MNTR PHYSIOL PARAM 16-30: CPT | Performed by: INTERNAL MEDICINE

## 2023-12-01 ENCOUNTER — ANTICOAGULATION VISIT (OUTPATIENT)
Dept: PHARMACY | Facility: HOSPITAL | Age: 88
End: 2023-12-01
Payer: MEDICARE

## 2023-12-01 DIAGNOSIS — I48.0 PAROXYSMAL ATRIAL FIBRILLATION: Primary | ICD-10-CM

## 2023-12-01 LAB — INR PPP: 2.4

## 2023-12-01 NOTE — PROGRESS NOTES
Anticoagulation Clinic Progress Note    Anticoagulation Summary  As of 2023      INR goal:  2.0-3.0   TTR:  66.7% (5 y)   INR used for dosin.40 (2023)   Warfarin maintenance plan:  2 mg every Sun, Fri; 4 mg all other days   Weekly warfarin total:  24 mg   No change documented:  Jaylin Nick PharmD   Plan last modified:  Petra Oliveira RPH (2023)   Next INR check:  2023   Priority:  Maintenance   Target end date:  Indefinite    Indications    Paroxysmal atrial fibrillation [I48.0]                 Anticoagulation Episode Summary       INR check location:      Preferred lab:      Send INR reminders to:  [facility] CLINICAL POOL    Comments:  [facility] beginning 20          Anticoagulation Care Providers       Provider Role Specialty Phone number    Jonah Regalado Jr., MD Referring Cardiology [phone]            Clinic Interview:      INR History:      10/6/2023     9:50 AM 10/20/2023    12:00 AM 10/20/2023    10:01 AM 11/3/2023    12:00 AM 11/3/2023     9:14 AM 2023    12:00 AM 2023     8:57 AM   Anticoagulation Monitoring   INR 3.30  2.90  2.60  2.40   INR Date 10/6/2023  10/20/2023  11/3/2023  2023   INR Goal 2.0-3.0  2.0-3.0  2.0-3.0  2.0-3.0   Trend Same  Same  Same  Same   Last Week Total 24 mg  24 mg  24 mg  24 mg   Next Week Total 22 mg  24 mg  24 mg  24 mg   Sun 2 mg  2 mg  2 mg  2 mg   Mon 4 mg  4 mg  4 mg  4 mg   Tue 4 mg  4 mg  4 mg  4 mg   Wed 4 mg  4 mg  4 mg  4 mg   Thu 4 mg  4 mg  4 mg  4 mg   Fri Hold (10/6); Otherwise 2 mg  2 mg  2 mg  2 mg   Sat 4 mg  4 mg  4 mg  4 mg   Historical INR  2.90      2.60      2.40            This result is from an external source.       Plan:  1. INR is Therapeutic today- see above in Anticoagulation Summary.   Will instruct Caitlyn Longoria to Continue their warfarin regimen- see above in Anticoagulation Summary.  2. Follow up in 4 weeks  3. Secure voicemail with instructions and follow up plan. They  have been instructed to call if any changes in medications, doses, concerns, etc. Patient expresses understanding and has no further questions at this time.    Jaylin Nick, PharmD

## 2023-12-14 ENCOUNTER — LAB (OUTPATIENT)
Dept: LAB | Facility: HOSPITAL | Age: 88
End: 2023-12-14
Payer: MEDICARE

## 2023-12-14 ENCOUNTER — INFUSION (OUTPATIENT)
Dept: ONCOLOGY | Facility: HOSPITAL | Age: 88
End: 2023-12-14
Payer: MEDICARE

## 2023-12-14 VITALS
WEIGHT: 186.4 LBS | BODY MASS INDEX: 34.08 KG/M2 | RESPIRATION RATE: 16 BRPM | SYSTOLIC BLOOD PRESSURE: 111 MMHG | TEMPERATURE: 97.5 F | HEART RATE: 79 BPM | DIASTOLIC BLOOD PRESSURE: 61 MMHG | OXYGEN SATURATION: 95 %

## 2023-12-14 DIAGNOSIS — C91.12 CLL (CHRONIC LYMPHOID LEUKEMIA) IN RELAPSE: Primary | ICD-10-CM

## 2023-12-14 LAB
BASOPHILS # BLD AUTO: 0.02 10*3/MM3 (ref 0–0.2)
BASOPHILS NFR BLD AUTO: 0.1 % (ref 0–1.5)
DEPRECATED RDW RBC AUTO: 54.6 FL (ref 37–54)
EOSINOPHIL # BLD AUTO: 0 10*3/MM3 (ref 0–0.4)
EOSINOPHIL NFR BLD AUTO: 0 % (ref 0.3–6.2)
ERYTHROCYTE [DISTWIDTH] IN BLOOD BY AUTOMATED COUNT: 14.7 % (ref 12.3–15.4)
HCT VFR BLD AUTO: 42.7 % (ref 34–46.6)
HGB BLD-MCNC: 13.5 G/DL (ref 12–15.9)
IGA1 MFR SER: <50 MG/DL (ref 70–400)
IGG1 SER-MCNC: 999 MG/DL (ref 700–1600)
IGM SERPL-MCNC: 31 MG/DL (ref 40–230)
IMM GRANULOCYTES # BLD AUTO: 0.04 10*3/MM3 (ref 0–0.05)
IMM GRANULOCYTES NFR BLD AUTO: 0.2 % (ref 0–0.5)
LYMPHOCYTES # BLD AUTO: 11.03 10*3/MM3 (ref 0.7–3.1)
LYMPHOCYTES NFR BLD AUTO: 66.4 % (ref 19.6–45.3)
MCH RBC QN AUTO: 32 PG (ref 26.6–33)
MCHC RBC AUTO-ENTMCNC: 31.6 G/DL (ref 31.5–35.7)
MCV RBC AUTO: 101.2 FL (ref 79–97)
MONOCYTES # BLD AUTO: 0.22 10*3/MM3 (ref 0.1–0.9)
MONOCYTES NFR BLD AUTO: 1.3 % (ref 5–12)
NEUTROPHILS NFR BLD AUTO: 32 % (ref 42.7–76)
NEUTROPHILS NFR BLD AUTO: 5.29 10*3/MM3 (ref 1.7–7)
NRBC BLD AUTO-RTO: 0 /100 WBC (ref 0–0.2)
PLATELET # BLD AUTO: 130 10*3/MM3 (ref 140–450)
PMV BLD AUTO: 9.8 FL (ref 6–12)
RBC # BLD AUTO: 4.22 10*6/MM3 (ref 3.77–5.28)
WBC NRBC COR # BLD AUTO: 16.6 10*3/MM3 (ref 3.4–10.8)

## 2023-12-14 PROCEDURE — 85025 COMPLETE CBC W/AUTO DIFF WBC: CPT

## 2023-12-14 PROCEDURE — 63710000001 ACETAMINOPHEN 325 MG TABLET: Performed by: INTERNAL MEDICINE

## 2023-12-14 PROCEDURE — 25810000003 SODIUM CHLORIDE 0.9 % SOLUTION: Performed by: INTERNAL MEDICINE

## 2023-12-14 PROCEDURE — 63710000001 HYDROXYZINE PAMOATE PER 25 MG: Performed by: INTERNAL MEDICINE

## 2023-12-14 PROCEDURE — 25010000002 IMMUNE GLOBULIN (HUMAN) 30 GM/300ML SOLUTION: Performed by: INTERNAL MEDICINE

## 2023-12-14 PROCEDURE — 82784 ASSAY IGA/IGD/IGG/IGM EACH: CPT | Performed by: INTERNAL MEDICINE

## 2023-12-14 PROCEDURE — A9270 NON-COVERED ITEM OR SERVICE: HCPCS | Performed by: INTERNAL MEDICINE

## 2023-12-14 PROCEDURE — 96365 THER/PROPH/DIAG IV INF INIT: CPT

## 2023-12-14 PROCEDURE — 96366 THER/PROPH/DIAG IV INF ADDON: CPT

## 2023-12-14 RX ORDER — FAMOTIDINE 10 MG/ML
20 INJECTION, SOLUTION INTRAVENOUS AS NEEDED
Status: CANCELLED | OUTPATIENT
Start: 2023-12-14

## 2023-12-14 RX ORDER — DIPHENHYDRAMINE HYDROCHLORIDE 50 MG/ML
50 INJECTION INTRAMUSCULAR; INTRAVENOUS AS NEEDED
Status: CANCELLED | OUTPATIENT
Start: 2023-12-14

## 2023-12-14 RX ORDER — SODIUM CHLORIDE 9 MG/ML
250 INJECTION, SOLUTION INTRAVENOUS ONCE
Status: COMPLETED | OUTPATIENT
Start: 2023-12-14 | End: 2023-12-14

## 2023-12-14 RX ORDER — ACETAMINOPHEN 325 MG/1
650 TABLET ORAL ONCE
Status: COMPLETED | OUTPATIENT
Start: 2023-12-14 | End: 2023-12-14

## 2023-12-14 RX ORDER — HYDROXYZINE PAMOATE 25 MG/1
25 CAPSULE ORAL ONCE
Status: COMPLETED | OUTPATIENT
Start: 2023-12-14 | End: 2023-12-14

## 2023-12-14 RX ORDER — MEPERIDINE HYDROCHLORIDE 50 MG/ML
25 INJECTION INTRAMUSCULAR; INTRAVENOUS; SUBCUTANEOUS
Status: CANCELLED | OUTPATIENT
Start: 2023-12-14 | End: 2023-12-15

## 2023-12-14 RX ADMIN — SODIUM CHLORIDE 250 ML: 9 INJECTION, SOLUTION INTRAVENOUS at 09:15

## 2023-12-14 RX ADMIN — IMMUNE GLOBULIN INFUSION (HUMAN) 30 G: 100 INJECTION, SOLUTION INTRAVENOUS; SUBCUTANEOUS at 09:15

## 2023-12-14 RX ADMIN — HYDROXYZINE PAMOATE 25 MG: 25 CAPSULE ORAL at 08:56

## 2023-12-14 RX ADMIN — ACETAMINOPHEN 650 MG: 325 TABLET ORAL at 08:56

## 2023-12-14 NOTE — NURSING NOTE
Patient is tolerating IVIG without difficulty. Verbal order per Dr. Larkin to continue IVIG as ordered.       Pancytopenia  Fevers - resolved  Lupus flare  Diarrhea - being ruled out for c. Difficile    Plan - DC Rocephin  cont high dose steroids with a slow taper  if C. diff is negative then DC home.

## 2023-12-22 ENCOUNTER — TELEPHONE (OUTPATIENT)
Dept: PHARMACY | Facility: HOSPITAL | Age: 88
End: 2023-12-22
Payer: MEDICARE

## 2023-12-22 NOTE — TELEPHONE ENCOUNTER
Patient called to report she will be starting Doxycycline today x 7 days. She will reach out to schedule Liz to check her INR next Wednesday 12/27.

## 2023-12-29 ENCOUNTER — ANTICOAGULATION VISIT (OUTPATIENT)
Dept: PHARMACY | Facility: HOSPITAL | Age: 88
End: 2023-12-29
Payer: MEDICARE

## 2023-12-29 DIAGNOSIS — I48.0 PAROXYSMAL ATRIAL FIBRILLATION: Primary | ICD-10-CM

## 2023-12-29 LAB — INR PPP: 3.1

## 2023-12-29 NOTE — PROGRESS NOTES
Anticoagulation Clinic Progress Note    Anticoagulation Summary  As of 12/29/2023      INR goal:  2.0-3.0   TTR:  67.0% (5 y)   INR used for dosing:  3.10 (12/29/2023)   Warfarin maintenance plan:  2 mg every Sun, Fri; 4 mg all other days   Weekly warfarin total:  24 mg   No change documented:  Petra Oliveira RPH   Plan last modified:  Petra Oliveira RPH (8/14/2023)   Next INR check:  1/26/2024   Priority:  Maintenance   Target end date:  Indefinite    Indications    Paroxysmal atrial fibrillation [I48.0]                 Anticoagulation Episode Summary       INR check location:      Preferred lab:      [facility]:   [facility]    Comments:  [facility] beginning 4/22/20          Anticoagulation Care Providers       Provider Role Specialty Phone number    Jonah Regalado Jr., MD Referring Cardiology [phone]            Clinic Interview:  Patient Findings     Positives:  Change in medications    Negatives:  Signs/symptoms of thrombosis, Signs/symptoms of bleeding,   Laboratory test error suspected, Change in health, Change in alcohol use,   Change in activity, Upcoming invasive procedure, Emergency department   visit, Upcoming dental procedure, Missed doses, Extra doses, Change in   diet/appetite, Hospital admission, Bruising, Other complaints    Comments:   started doxycyline x 7 days on 12/22. Last dose this morning.         Clinical Outcomes     Negatives:  Major bleeding event, Thromboembolic event,   Anticoagulation-related hospital admission, Anticoagulation-related ED   visit, Anticoagulation-related fatality    Comments:   started doxycyline x 7 days on 12/22. Last dose this morning.           INR History:      10/20/2023    10:01 AM 11/3/2023    12:00 AM 11/3/2023     9:14 AM 12/1/2023    12:00 AM 12/1/2023     8:57 AM 12/29/2023    12:00 AM 12/29/2023     8:56 AM   Anticoagulation Monitoring   INR 2.90  2.60  2.40  3.10   INR Date 10/20/2023  11/3/2023  12/1/2023   12/29/2023   INR Goal 2.0-3.0  2.0-3.0  2.0-3.0  2.0-3.0   Trend Same  Same  Same  Same   Last Week Total 24 mg  24 mg  24 mg  24 mg   Next Week Total 24 mg  24 mg  24 mg  24 mg   Sun 2 mg  2 mg  2 mg  2 mg   Mon 4 mg  4 mg  4 mg  4 mg   Tue 4 mg  4 mg  4 mg  4 mg   Wed 4 mg  4 mg  4 mg  4 mg   Thu 4 mg  4 mg  4 mg  4 mg   Fri 2 mg  2 mg  2 mg  2 mg   Sat 4 mg  4 mg  4 mg  4 mg   Historical INR  2.60      2.40      3.10            This result is from an external source.       Plan:  1. INR is Supratherapeutic today- see above in Anticoagulation Summary.   Will instruct Caitlyn GARRETT Longoria to Continue their warfarin regimen as INR is very close to goal and will be finished with doxycycline this evening- see above in Anticoagulation Summary.  2. Follow up in 4 weeks  3.They have been instructed to call if any changes in medications, doses, concerns, etc. Patient expresses understanding and has no further questions at this time.    [facility]

## 2024-01-11 ENCOUNTER — LAB (OUTPATIENT)
Dept: LAB | Facility: HOSPITAL | Age: 89
End: 2024-01-11
Payer: MEDICARE

## 2024-01-11 ENCOUNTER — INFUSION (OUTPATIENT)
Dept: ONCOLOGY | Facility: HOSPITAL | Age: 89
End: 2024-01-11
Payer: MEDICARE

## 2024-01-11 ENCOUNTER — OFFICE VISIT (OUTPATIENT)
Dept: ONCOLOGY | Facility: CLINIC | Age: 89
End: 2024-01-11
Payer: MEDICARE

## 2024-01-11 VITALS — SYSTOLIC BLOOD PRESSURE: 115 MMHG | DIASTOLIC BLOOD PRESSURE: 65 MMHG | HEART RATE: 80 BPM

## 2024-01-11 VITALS
TEMPERATURE: 98.2 F | BODY MASS INDEX: 34.82 KG/M2 | WEIGHT: 189.2 LBS | SYSTOLIC BLOOD PRESSURE: 103 MMHG | DIASTOLIC BLOOD PRESSURE: 63 MMHG | HEIGHT: 62 IN | RESPIRATION RATE: 18 BRPM | OXYGEN SATURATION: 96 % | HEART RATE: 92 BPM

## 2024-01-11 DIAGNOSIS — D80.1 HYPOGAMMAGLOBULINEMIA: ICD-10-CM

## 2024-01-11 DIAGNOSIS — C91.12 CLL (CHRONIC LYMPHOID LEUKEMIA) IN RELAPSE: Primary | ICD-10-CM

## 2024-01-11 DIAGNOSIS — C91.12 CLL (CHRONIC LYMPHOID LEUKEMIA) IN RELAPSE: ICD-10-CM

## 2024-01-11 DIAGNOSIS — Z79.899 HIGH RISK MEDICATION USE: ICD-10-CM

## 2024-01-11 LAB
ALBUMIN SERPL-MCNC: 3.8 G/DL (ref 3.5–5.2)
ALBUMIN/GLOB SERPL: 1.4 G/DL
ALP SERPL-CCNC: 99 U/L (ref 39–117)
ALT SERPL W P-5'-P-CCNC: 16 U/L (ref 1–33)
ANION GAP SERPL CALCULATED.3IONS-SCNC: 9.4 MMOL/L (ref 5–15)
AST SERPL-CCNC: 30 U/L (ref 1–32)
BASOPHILS # BLD AUTO: 0.02 10*3/MM3 (ref 0–0.2)
BASOPHILS NFR BLD AUTO: 0.1 % (ref 0–1.5)
BILIRUB SERPL-MCNC: 0.7 MG/DL (ref 0–1.2)
BUN SERPL-MCNC: 17 MG/DL (ref 8–23)
BUN/CREAT SERPL: 19.3 (ref 7–25)
CALCIUM SPEC-SCNC: 8.9 MG/DL (ref 8.6–10.5)
CHLORIDE SERPL-SCNC: 107 MMOL/L (ref 98–107)
CO2 SERPL-SCNC: 27.6 MMOL/L (ref 22–29)
CREAT SERPL-MCNC: 0.88 MG/DL (ref 0.57–1)
DEPRECATED RDW RBC AUTO: 56.8 FL (ref 37–54)
EGFRCR SERPLBLD CKD-EPI 2021: 62.9 ML/MIN/1.73
EOSINOPHIL # BLD AUTO: 0 10*3/MM3 (ref 0–0.4)
EOSINOPHIL NFR BLD AUTO: 0 % (ref 0.3–6.2)
ERYTHROCYTE [DISTWIDTH] IN BLOOD BY AUTOMATED COUNT: 15 % (ref 12.3–15.4)
GLOBULIN UR ELPH-MCNC: 2.7 GM/DL
GLUCOSE SERPL-MCNC: 126 MG/DL (ref 65–99)
HCT VFR BLD AUTO: 40.7 % (ref 34–46.6)
HGB BLD-MCNC: 12.9 G/DL (ref 12–15.9)
IGA1 MFR SER: <50 MG/DL (ref 70–400)
IGG1 SER-MCNC: 1046 MG/DL (ref 700–1600)
IGM SERPL-MCNC: 26 MG/DL (ref 40–230)
IMM GRANULOCYTES # BLD AUTO: 0.04 10*3/MM3 (ref 0–0.05)
IMM GRANULOCYTES NFR BLD AUTO: 0.2 % (ref 0–0.5)
LYMPHOCYTES # BLD AUTO: 14.43 10*3/MM3 (ref 0.7–3.1)
LYMPHOCYTES NFR BLD AUTO: 79.1 % (ref 19.6–45.3)
MCH RBC QN AUTO: 32.3 PG (ref 26.6–33)
MCHC RBC AUTO-ENTMCNC: 31.7 G/DL (ref 31.5–35.7)
MCV RBC AUTO: 101.8 FL (ref 79–97)
MONOCYTES # BLD AUTO: 0.21 10*3/MM3 (ref 0.1–0.9)
MONOCYTES NFR BLD AUTO: 1.2 % (ref 5–12)
NEUTROPHILS NFR BLD AUTO: 19.4 % (ref 42.7–76)
NEUTROPHILS NFR BLD AUTO: 3.55 10*3/MM3 (ref 1.7–7)
NRBC BLD AUTO-RTO: 0 /100 WBC (ref 0–0.2)
PLATELET # BLD AUTO: 132 10*3/MM3 (ref 140–450)
PMV BLD AUTO: 9.4 FL (ref 6–12)
POTASSIUM SERPL-SCNC: 3.8 MMOL/L (ref 3.5–5.2)
PROT SERPL-MCNC: 6.5 G/DL (ref 6–8.5)
RBC # BLD AUTO: 4 10*6/MM3 (ref 3.77–5.28)
SODIUM SERPL-SCNC: 144 MMOL/L (ref 136–145)
WBC NRBC COR # BLD AUTO: 18.25 10*3/MM3 (ref 3.4–10.8)

## 2024-01-11 PROCEDURE — 80053 COMPREHEN METABOLIC PANEL: CPT

## 2024-01-11 PROCEDURE — 25810000003 SODIUM CHLORIDE 0.9 % SOLUTION: Performed by: NURSE PRACTITIONER

## 2024-01-11 PROCEDURE — 25010000002 IMMUNE GLOBULIN (HUMAN) 30 GM/300ML SOLUTION: Performed by: NURSE PRACTITIONER

## 2024-01-11 PROCEDURE — A9270 NON-COVERED ITEM OR SERVICE: HCPCS | Performed by: NURSE PRACTITIONER

## 2024-01-11 PROCEDURE — 82784 ASSAY IGA/IGD/IGG/IGM EACH: CPT | Performed by: NURSE PRACTITIONER

## 2024-01-11 PROCEDURE — 85025 COMPLETE CBC W/AUTO DIFF WBC: CPT

## 2024-01-11 PROCEDURE — 96366 THER/PROPH/DIAG IV INF ADDON: CPT

## 2024-01-11 PROCEDURE — 63710000001 HYDROXYZINE PAMOATE PER 25 MG: Performed by: NURSE PRACTITIONER

## 2024-01-11 PROCEDURE — 96365 THER/PROPH/DIAG IV INF INIT: CPT

## 2024-01-11 PROCEDURE — 36415 COLL VENOUS BLD VENIPUNCTURE: CPT

## 2024-01-11 PROCEDURE — 63710000001 ACETAMINOPHEN 325 MG TABLET: Performed by: NURSE PRACTITIONER

## 2024-01-11 RX ORDER — ACETAMINOPHEN 325 MG/1
650 TABLET ORAL ONCE
Status: CANCELLED | OUTPATIENT
Start: 2024-01-11

## 2024-01-11 RX ORDER — SODIUM CHLORIDE 9 MG/ML
250 INJECTION, SOLUTION INTRAVENOUS ONCE
Status: COMPLETED | OUTPATIENT
Start: 2024-01-11 | End: 2024-01-11

## 2024-01-11 RX ORDER — HYDROXYZINE PAMOATE 25 MG/1
25 CAPSULE ORAL ONCE
Status: CANCELLED | OUTPATIENT
Start: 2024-01-11

## 2024-01-11 RX ORDER — HYDROXYZINE PAMOATE 25 MG/1
25 CAPSULE ORAL ONCE
Status: COMPLETED | OUTPATIENT
Start: 2024-01-11 | End: 2024-01-11

## 2024-01-11 RX ORDER — SODIUM CHLORIDE 9 MG/ML
250 INJECTION, SOLUTION INTRAVENOUS ONCE
Status: CANCELLED | OUTPATIENT
Start: 2024-01-11

## 2024-01-11 RX ORDER — LOPERAMIDE HYDROCHLORIDE AND SIMETHICONE 2; 125 MG/1; MG/1
TABLET ORAL
COMMUNITY

## 2024-01-11 RX ORDER — PANTOPRAZOLE SODIUM 40 MG/1
90 TABLET, DELAYED RELEASE ORAL
COMMUNITY

## 2024-01-11 RX ORDER — LOSARTAN POTASSIUM 25 MG/1
TABLET ORAL
COMMUNITY

## 2024-01-11 RX ORDER — FAMOTIDINE 10 MG/ML
20 INJECTION, SOLUTION INTRAVENOUS AS NEEDED
Status: CANCELLED | OUTPATIENT
Start: 2024-01-11

## 2024-01-11 RX ORDER — KETOCONAZOLE 20 MG/G
CREAM TOPICAL
COMMUNITY

## 2024-01-11 RX ORDER — ACETAMINOPHEN 325 MG/1
650 TABLET ORAL ONCE
Status: COMPLETED | OUTPATIENT
Start: 2024-01-11 | End: 2024-01-11

## 2024-01-11 RX ORDER — ASPIRIN 81 MG/1
TABLET ORAL
COMMUNITY

## 2024-01-11 RX ORDER — DIPHENHYDRAMINE HYDROCHLORIDE 50 MG/ML
50 INJECTION INTRAMUSCULAR; INTRAVENOUS AS NEEDED
Status: CANCELLED | OUTPATIENT
Start: 2024-01-11

## 2024-01-11 RX ADMIN — IMMUNE GLOBULIN INFUSION (HUMAN) 30 G: 100 INJECTION, SOLUTION INTRAVENOUS; SUBCUTANEOUS at 10:44

## 2024-01-11 RX ADMIN — ACETAMINOPHEN 650 MG: 325 TABLET ORAL at 10:22

## 2024-01-11 RX ADMIN — HYDROXYZINE PAMOATE 25 MG: 25 CAPSULE ORAL at 10:22

## 2024-01-11 RX ADMIN — SODIUM CHLORIDE 250 ML: 9 INJECTION, SOLUTION INTRAVENOUS at 10:22

## 2024-01-11 NOTE — PROGRESS NOTES
Subjective .     REASONS FOR FOLLOW UP:  1. chronic lymphocytic leukemia with recurrent lung infections  2.  Hypogammaglobulinemia    Referring MD:    Amarilis Suarez MD    History of Present Illness patient is an.age female with 2014 with stage 0 CLL which has never required treatments.    In 2019 she  had multiple hospitalizations for lung infections predominantly viral probably also bacterial and at her last hospitalization in October we rechecked her gammaglobulins which were low and opted to start IV IgG monthly to see if this would keep her out of the hospital. She has continued on monthly IVIG since from October through March .  The patient has not had any recurrent infections/hospitalizations since initiating IVIG.    As she is reviewed back today, she is doing well overall.  Tolerating IVIG well.  We reviewed her CBC showing slight trend in her WBC and absolute lymphocyte count. Denies any B symptoms. No change in appetite or weight loss, no night sweats or fever.  Denies any new palpable adenopathy she is feeling about the same, noting that she does not sleep well but this is a longstanding chronic issue.    Denies other concerns at this time.    Past Medical History:   Diagnosis Date    Aortic calcification     mild, on echo 12/17/2017    Aortic regurgitation     Trace    Asthma     Atrial fibrillation     CAD (coronary artery disease)     Carpal tunnel syndrome of left wrist     Chronic combined systolic and diastolic congestive heart failure     CKD (chronic kidney disease) stage 3, GFR 30-59 ml/min     COPD (chronic obstructive pulmonary disease)     Coronary artery disease involving native coronary artery of native heart with angina pectoris     Disc degeneration, lumbar     Diverticulosis     DM type 2 (diabetes mellitus, type 2)     GERD (gastroesophageal reflux disease)     History of aneurysm     right femoral artery s/p LHC    History of blood transfusion     History of fracture     History of  heart attack     History of vitamin D deficiency     Hyperlipidemia     Hypertension     Leukemia     Mild mitral regurgitation     Mitral annular calcification     12/8/2017- echo, moderate    Osteopenia     PAF (paroxysmal atrial fibrillation)     Peripheral neuropathy     Skin cancer     Left hand    Sleep apnea     bipap    SSS (sick sinus syndrome)     Stroke (cerebrum)     TIA (transient ischemic attack) 2017    Tricuspid regurgitation     Trace       ONCOLOGIC HISTORY:    [facility] HISTORY  Patient is an 85-year-old female whom I had seen in 2014 when she was hospitalized at [facility] and was diagnosed with chronic lymphocytic leukemia.  She has not been to see me in over 4 years and is following with Dr. Suarez her primary care doctor and her white count is gone up a little higher than usual to 22,000 and she is referred back to us for evaluation.  We are doing a telephone visit because of the coronavirus pandemic and her age and susceptibility.    When I originally saw her in 2014 she was brought into the ER unresponsive in septic shock on 03/25/2014 with methicillin-resistant staphylococcus identified in her blood cultures. The patient has been on antibiotic with improvement, but had some residual kidney damage with a creatinine in the 4-range requiring hemodialysis, and was noted on admission to have an elevated white count with lymphocytosis, normal hemoglobin, but a platelet count of 95,000, and over the course of the illness her platelet count normalized and the white count had gone up into the 20,000 range with lymphocytosis. A flow cytometry was sent which is consistent with CLL, she came to our office once or twice and then stopped coming because she was so stable     She tells me now she was hospitalized recently with rhinovirus infection and has had diagnosed with asthma and is having a lot of respiratory issues but does not require oxygen.She is at the [facility] in independent living and managing  fairly well    She denies fever sweats or weight loss.  Her last white count was on 5/26/2020  Showed a white count of 18,000 with 66 lymphs and 27 neutrophils platelet white hemoglobin is 12.6 platelet 188,000    I reassured Christopher that no treatment is indicated for this level of leukocytosis from CLL and if she has no systemic complaints I do not feel strongly about doing CAT scans at her age but I would like to physically examine her in the office in a couple of months to make sure there is no obvious adenopathy or hepatosplenomegaly.    She does have recurrent infections and we can check quantitative immunoglobulins to see how low they are as another adjunctive option to prevent recurrent infections if she has hypogammaglobulinemia    She remains on Coumadin for atrial fibrillation      Current Outpatient Medications on File Prior to Visit   Medication Sig Dispense Refill    acetaminophen (TYLENOL) 325 MG tablet Take 2 tablets by mouth Every 4 (Four) Hours As Needed for Mild Pain .      acetylcysteine (MUCOMYST) 20 % nebulizer solution Take 2 mL by nebulization 4 (Four) Times a Day.      albuterol (PROVENTIL) (2.5 MG/3ML) 0.083% nebulizer solution Take 2.5 mg by nebulization Every 4 (Four) Hours.      albuterol sulfate  (90 Base) MCG/ACT inhaler Inhale 2 puffs Every 4 (Four) Hours As Needed for Wheezing. 1 inhaler 3    amoxicillin (AMOXIL) 500 MG capsule Take 1 capsule by mouth. Before dental procedures/cleanings      aspirin 81 MG EC tablet tablet      Benralizumab (FASENRA SC) Inject  under the skin into the appropriate area as directed. Gets one shot a month, next shot may 13 then one every 8 weeks      Calcium Carbonate-Vitamin D (calcium-vitamin D) 500-200 MG-UNIT tablet per tablet Take 1 tablet by mouth.      carvedilol (COREG) 3.125 MG tablet TAKE 1 TABLET TWICE A  tablet 2    cephalexin (KEFLEX) 500 MG capsule Take 1 capsule by mouth 3 (Three) Times a Day.      Cetirizine HCl 10 MG capsule  Take  by mouth.      ciclopirox (LOPROX) 0.77 % suspension Apply  topically to the appropriate area as directed.      fluticasone (FLONASE) 50 MCG/ACT nasal spray 1 spray into the nostril(s) as directed by provider Daily.      Fluticasone-Umeclidin-Vilant (TRELEGY) 100-62.5-25 MCG/INH inhaler Inhale 1 puff Daily. As directed      furosemide (LASIX) 20 MG tablet Take 1 tablet by mouth As Needed (For weight gain of greater than 2 pounds in 1 day or 5 pounds in 1 week). 30 tablet 11    glipizide (GLUCOTROL) 5 MG tablet Take 1 tablet by mouth. Take one half tablet by mouth daily      guaiFENesin (MUCINEX) 600 MG 12 hr tablet Take 1 tablet by mouth Every 12 (Twelve) Hours.      HyperSal 7 % nebulizer solution nebulizer solution USE 1 VIAL VIA NEBULIZER TWICE DAILY      ketoconazole (NIZORAL) 2 % cream 60      Loperamide HCl (IMODIUM PO) Take  by mouth Daily.      Loperamide-Simethicone (Imodium Multi-Symptom Relief) 2-125 MG tablet tablet      losartan (COZAAR) 25 MG tablet tablet      melatonin 5 MG tablet tablet Take 1 tablet by mouth Every Night.      metFORMIN ER (GLUCOPHAGE-XR) 500 MG 24 hr tablet       montelukast (SINGULAIR) 10 MG tablet Take 1 tablet by mouth Every Night.      mupirocin (BACTROBAN) 2 % ointment APPLY TO AFFECTED NOSTRIL TWICE DAILY FOR 10 DAYS      nystatin (MYCOSTATIN) 787468 UNIT/GM cream       oxybutynin XL (DITROPAN-XL) 10 MG 24 hr tablet Take 1 tablet by mouth every night at bedtime.      pantoprazole (PROTONIX) 40 MG EC tablet 90 tablets.      polyethyl glycol-propyl glycol (SYSTANE) 0.4-0.3 % solution ophthalmic solution (artificial tears) Administer 1 drop to both eyes Every 1 (One) Hour As Needed.      rosuvastatin (CRESTOR) 20 MG tablet Take 1 tablet by mouth Every Night.      warfarin (COUMADIN) 4 MG tablet TAKE ONE-HALF (1/2) TABLET ON SUNDAY AND FRIDAY AND TAKE ONE TABLET ALL OTHER DAYS OR AS DIRECTED 80 tablet 1     No current facility-administered medications on file prior to  visit.       ALLERGIES:     Allergies   Allergen Reactions    Accupril [Quinapril Hcl] Swelling, Other (See Comments), GI Intolerance and Delirium     HA, constipation     Ahist [Chlorpheniramine] Nausea Only, Other (See Comments) and Dizziness     Headache, Blurred vision    Clarithromycin Nausea Only, Other (See Comments) and Mental Status Change     HA, Depression, Flushing    Esomeprazole GI Intolerance    Latex Other (See Comments)     Skin breakdown    Levalbuterol Swelling    Levocetirizine Diarrhea and GI Intolerance    Lipitor [Atorvastatin] Other (See Comments) and Myalgia     Dark urine    Omeprazole Nausea Only and Other (See Comments)     HA    Pravachol [Pravastatin] Nausea Only and GI Intolerance     Bloated, Constipation, HA    Sulindac Other (See Comments) and Myalgia     HA, joint pain, bruising    Valdecoxib Irritability    Chlorcyclizine Unknown - Low Severity    Diclofenac Sodium Unknown - Low Severity    Diphenhydramine Unknown - Low Severity    Lodine [Etodolac] Unknown - Low Severity    Sulfa Antibiotics Unknown - Low Severity       Social History     Socioeconomic History    Marital status: Single   Tobacco Use    Smoking status: Never    Smokeless tobacco: Never    Tobacco comments:     caffeine use- soda   Vaping Use    Vaping Use: Never used   Substance and Sexual Activity    Alcohol use: Never    Drug use: No    Sexual activity: Defer         Cancer-related family history includes Colon cancer in her sister.     I have reviewed the patient's medical history in detail and updated the computerized patient record.    Review of Systems  I have reviewed and confirmed the accuracy of the ROS as documented by the MA/JUSTINN/RN KRISTIN Juárez      Objective      Vitals:    01/11/24 0924   BP: 103/63   Pulse: 92   Resp: 18   Temp: 98.2 °F (36.8 °C)   SpO2: 96%           1/11/2024     9:23 AM   Current Status   ECOG score 1     Pain Score    01/11/24 0924   PainSc: 0-No pain          CONSTITUTIONAL:  Vital signs reviewed.  No distress, looks comfortable.  EYES:  Conjunctiva and lids unremarkable.  PERRLA  EARS,NOSE,MOUTH,THROAT: Hearing intact.  Lips, teeth, gums appear unremarkable.  RESPIRATORY:  Normal respiratory effort.  Lungs clear to auscultation on the right decreased breath breath sounds lower half of the left lung  CARDIOVASCULAR:  Normal S1, S2.  No murmurs rubs or gallops.  No significant lower extremity edema.  GASTROINTESTINAL: Abdomen unremarkable.  Nontender. No hepatosplenomegaly.   LYMPHATIC:  No cervical, supraclavicular, inguinal lymphadenopathy.  SKIN:  Warm.  No rashes.  Numerous ecchymosis on her arms and legs  PSYCHIATRIC:  Normal judgment and insight.  Normal mood and affect.     I have reexamined the patient 01/11/2024 and the results are consistent with the previously documented exam except as updated. Kristel Martel, KRISTIN       RECENT LABS:  Results from last 7 days   Lab Units 01/11/24  0852   WBC 10*3/mm3 18.25*   NEUTROS ABS 10*3/mm3 3.55   HEMOGLOBIN g/dL 12.9   HEMATOCRIT % 40.7   PLATELETS 10*3/mm3 132*       Results from last 7 days   Lab Units 01/11/24  0852   SODIUM mmol/L 144   POTASSIUM mmol/L 3.8   CHLORIDE mmol/L 107   CO2 mmol/L 27.6   BUN mg/dL 17   CREATININE mg/dL 0.88   CALCIUM mg/dL 8.9   ALBUMIN g/dL 3.8   BILIRUBIN mg/dL 0.7   ALK PHOS U/L 99   ALT (SGPT) U/L 16   AST (SGOT) U/L 30   GLUCOSE mg/dL 126*       Assessment & Plan    1. Chronic lymphocytic leukemia  -stage 0 since 2014 untreated  White count increased to 32,000 in 12/22 after prednisone pack.    1/11/2024 reviewed with patient slight trend and WBC up to 18.25, ALC up to 14%.  She has no other concerning findings clinically and we discussed continued observation for now.    2.  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  The patient received IVIG treatments October 2020 through March 2021.  We have held IVIG  since that time and the patient is happy to report no infections aside from some urinary tract infection which she has chronically.  Otherwise she has been feeling very well.  Resumes monthly IVIG from October through March on a yearly basis    3.  COPD/asthma /emphysema on home O2-recurrent respiratory infections with RSV and rhinovirus and bacteria    4.  Atrial fibrillation on Coumadin.  Stable with no bleeding.    5.  Hypertension/hypercholesterolemia/type 2 diabetes on treatment    6.  Vaccinated against coronavirus    7.  Mild anemia-currently normal    Plan:  Proceed with 30g IVIG for hypogammaglobulinemia.  This is being given monthly October through March (there was some consideration of looking into subcutaneous formulation due to poor IV access.  The patient however has declined this and prefers to receive through peripheral IV declines at this time and would prefer to continue through PIV)  Return in 1 month for IVIG 30 g   Return in 2 months for follow-up with Dr. Larkin and last IVIG of this season.    Keep an eye on WBC/ALC in setting of CLL.  Discussed course of continued observation for now.      This patient is on high risk drug therapy requiring intensive monitoring for toxicity.        Kristel Martel, APRN  01/11/2024

## 2024-01-11 NOTE — PLAN OF CARE
Problem: Adult Inpatient Plan of Care  Goal: Plan of Care Review  Recent Flowsheet Documentation  Taken 10/1/2020 1547 by Edie Lowry, PT  Plan of Care Reviewed With: patient  Outcome Summary: 86yo obese white female admitted 9/29/20 due to chronic SOA. PMH includes pneumonia, high cholesterol, CAD, CKD III, stroke, heart failure, afib, DM II. PLOF: Pt lived at [place] used r wx for mobility, independent with ADLs. Pt is primarily limited at this time by decreased endurance and increased discomfort in T spine. Pt req only CGA for bed mobility and transfers. She was very eager to amb - she amb 140' with r wx. Brace rep present to instruct in don/doffing TLSO brace.  She would benefit from skilled PT for ther ex, gt/transfer training, balance, and activity tolerance to prepare for d/c to [facility]. Pt requesting home PT upon d/c.  Patient was intermittently wearing a face mask during this therapy encounter. Therapist used appropriate personal protective equipment including eye protection, mask, and gloves.  Mask used was standard procedure mask. Appropriate PPE was worn during the entire therapy session. Hand hygiene was completed before and after therapy session. Patient is not in enhanced droplet precautions.        [Fully active, able to carry on all pre-disease performance without restriction] : Status 0 - Fully active, able to carry on all pre-disease performance without restriction [Normal] : grossly intact [de-identified] : surgical scar, no adenopathy

## 2024-01-15 RX ORDER — CEPHALEXIN 500 MG/1
500 CAPSULE ORAL 3 TIMES DAILY
Qty: 270 CAPSULE | Refills: 3 | Status: SHIPPED | OUTPATIENT
Start: 2024-01-15

## 2024-01-19 ENCOUNTER — ANTICOAGULATION VISIT (OUTPATIENT)
Dept: PHARMACY | Facility: HOSPITAL | Age: 89
End: 2024-01-19
Payer: MEDICARE

## 2024-01-19 DIAGNOSIS — I48.0 PAROXYSMAL ATRIAL FIBRILLATION: Primary | ICD-10-CM

## 2024-01-19 LAB — INR PPP: 1.8

## 2024-01-19 NOTE — PROGRESS NOTES
Anticoagulation Clinic Progress Note    Anticoagulation Summary  As of 2024      INR goal:  2.0-3.0   TTR:  67.1% (5.1 y)   INR used for dosin.80 (2024)   Warfarin maintenance plan:  2 mg every Sun, Fri; 4 mg all other days   Weekly warfarin total:  24 mg   Plan last modified:  Petra Oliveira RPH (2023)   Next INR check:  2024   Priority:  Maintenance   Target end date:  Indefinite    Indications    Paroxysmal atrial fibrillation [I48.0]                 Anticoagulation Episode Summary       INR check location:      Preferred lab:      Send INR reminders to:   JACEY SINCLAIR  POOL    Comments:  [facility] beginning 20          Anticoagulation Care Providers       Provider Role Specialty Phone number    Jonah Regalado Jr., MD Referring Cardiology [phone]            Clinic Interview:  Patient Findings     Negatives:  Signs/symptoms of thrombosis, Signs/symptoms of bleeding,   Laboratory test error suspected, Change in health, Change in alcohol use,   Change in activity, Upcoming invasive procedure, Emergency department   visit, Upcoming dental procedure, Missed doses, Extra doses, Change in   medications, Change in diet/appetite, Hospital admission, Bruising, Other   complaints      Clinical Outcomes     Negatives:  Major bleeding event, Thromboembolic event,   Anticoagulation-related hospital admission, Anticoagulation-related ED   visit, Anticoagulation-related fatality        INR History:      11/3/2023     9:14 AM 2023    12:00 AM 2023     8:57 AM 2023    12:00 AM 2023     8:56 AM 2024    12:00 AM 2024    11:42 AM   Anticoagulation Monitoring   INR 2.60  2.40  3.10  1.80   INR Date 11/3/2023  2023  2023  2024   INR Goal 2.0-3.0  2.0-3.0  2.0-3.0  2.0-3.0   Trend Same  Same  Same  Same   Last Week Total 24 mg  24 mg  24 mg  24 mg   Next Week Total 24 mg  24 mg  24 mg  26 mg   Sun 2 mg  2 mg  2 mg  2 mg   Mon 4 mg  4  mg  4 mg  4 mg   Tue 4 mg  4 mg  4 mg  4 mg   Wed 4 mg  4 mg  4 mg  4 mg   Thu 4 mg  4 mg  4 mg  4 mg   Fri 2 mg  2 mg  2 mg  4 mg (1/19); Otherwise 2 mg   Sat 4 mg  4 mg  4 mg  4 mg   Historical INR  2.40      3.10      1.80            This result is from an external source.       Plan:  1. INR is Subtherapeutic today- see above in Anticoagulation Summary.   Will instruct Caitlyn Longoria to Change their warfarin regimen (4 mg today, then resume 2 mg Sun/Fri, 4 mg all other days) - see above in Anticoagulation Summary.  2. Follow up in 2 weeks.  3. They have been instructed to call if any changes in medications, doses, concerns, etc. Patient expresses understanding and has no further questions at this time.    Alexander Valiente, PharmD

## 2024-02-02 ENCOUNTER — ANTICOAGULATION VISIT (OUTPATIENT)
Dept: PHARMACY | Facility: HOSPITAL | Age: 89
End: 2024-02-02
Payer: MEDICARE

## 2024-02-02 DIAGNOSIS — I48.0 PAROXYSMAL ATRIAL FIBRILLATION: Primary | ICD-10-CM

## 2024-02-02 LAB — INR PPP: 2.2

## 2024-02-02 NOTE — PROGRESS NOTES
Anticoagulation Clinic Progress Note    Anticoagulation Summary  As of 2024      INR goal:  2.0-3.0   TTR:  67.0% (5.1 y)   INR used for dosin.20 (2024)   Warfarin maintenance plan:  2 mg every Sun, Fri; 4 mg all other days   Weekly warfarin total:  24 mg   No change documented:  Petra Oilveira RPH   Plan last modified:  ePtra Oliveira RPH (2023)   Next INR check:  2024   Priority:  Maintenance   Target end date:  Indefinite    Indications    Paroxysmal atrial fibrillation [I48.0]                 Anticoagulation Episode Summary       INR check location:      Preferred lab:      Send INR reminders to:   JACEY [employer]  POOL    Comments:  [facility] beginning 20          Anticoagulation Care Providers       Provider Role Specialty Phone number    Jonah Regalado Jr., MD Referring Cardiology [phone]            Clinic Interview:  No pertinent clinical findings have been reported.    INR History:      2023     8:57 AM 2023    12:00 AM 2023     8:56 AM 2024    12:00 AM 2024    11:42 AM 2024    12:00 AM 2024    10:18 AM   Anticoagulation Monitoring   INR 2.40  3.10  1.80  2.20   INR Date 2023   INR Goal 2.0-3.0  2.0-3.0  2.0-3.0  2.0-3.0   Trend Same  Same  Same  Same   Last Week Total 24 mg  24 mg  24 mg  24 mg   Next Week Total 24 mg  24 mg  26 mg  24 mg   Sun 2 mg  2 mg  2 mg  2 mg   Mon 4 mg  4 mg  4 mg  4 mg   Tue 4 mg  4 mg  4 mg  4 mg   Wed 4 mg  4 mg  4 mg  4 mg   Thu 4 mg  4 mg  4 mg  4 mg   Fri 2 mg  2 mg  4 mg (); Otherwise 2 mg  2 mg   Sat 4 mg  4 mg  4 mg  4 mg   Historical INR  3.10      1.80      2.20            This result is from an external source.       Plan:  1. INR is therapeutic today- see above in Anticoagulation Summary.    Caitlyn Longoria to continue their warfarin regimen- see above in Anticoagulation Summary.  2. Follow up in 2 weeks  3.  They have been instructed to  call if any changes in medications, doses, concerns, etc. Patient expresses understanding and has no further questions at this time.    Petra Oliveira, [facility]

## 2024-02-08 ENCOUNTER — INFUSION (OUTPATIENT)
Dept: ONCOLOGY | Facility: HOSPITAL | Age: 89
End: 2024-02-08
Payer: MEDICARE

## 2024-02-08 ENCOUNTER — APPOINTMENT (OUTPATIENT)
Dept: LAB | Facility: HOSPITAL | Age: 89
End: 2024-02-08
Payer: MEDICARE

## 2024-02-08 VITALS
HEART RATE: 92 BPM | RESPIRATION RATE: 16 BRPM | SYSTOLIC BLOOD PRESSURE: 98 MMHG | TEMPERATURE: 97 F | WEIGHT: 188 LBS | OXYGEN SATURATION: 94 % | BODY MASS INDEX: 34.38 KG/M2 | DIASTOLIC BLOOD PRESSURE: 64 MMHG

## 2024-02-08 DIAGNOSIS — C91.12 CLL (CHRONIC LYMPHOID LEUKEMIA) IN RELAPSE: Primary | ICD-10-CM

## 2024-02-08 LAB
BASOPHILS # BLD AUTO: 0.02 10*3/MM3 (ref 0–0.2)
BASOPHILS NFR BLD AUTO: 0.1 % (ref 0–1.5)
DEPRECATED RDW RBC AUTO: 56.1 FL (ref 37–54)
EOSINOPHIL # BLD AUTO: 0.12 10*3/MM3 (ref 0–0.4)
EOSINOPHIL NFR BLD AUTO: 0.7 % (ref 0.3–6.2)
ERYTHROCYTE [DISTWIDTH] IN BLOOD BY AUTOMATED COUNT: 14.8 % (ref 12.3–15.4)
HCT VFR BLD AUTO: 40.7 % (ref 34–46.6)
HGB BLD-MCNC: 13.2 G/DL (ref 12–15.9)
IGA1 MFR SER: <50 MG/DL (ref 70–400)
IGG1 SER-MCNC: 1074 MG/DL (ref 700–1600)
IGM SERPL-MCNC: 27 MG/DL (ref 40–230)
IMM GRANULOCYTES # BLD AUTO: 0.03 10*3/MM3 (ref 0–0.05)
IMM GRANULOCYTES NFR BLD AUTO: 0.2 % (ref 0–0.5)
LYMPHOCYTES # BLD AUTO: 12.71 10*3/MM3 (ref 0.7–3.1)
LYMPHOCYTES NFR BLD AUTO: 78.8 % (ref 19.6–45.3)
MCH RBC QN AUTO: 32.9 PG (ref 26.6–33)
MCHC RBC AUTO-ENTMCNC: 32.4 G/DL (ref 31.5–35.7)
MCV RBC AUTO: 101.5 FL (ref 79–97)
MONOCYTES # BLD AUTO: 0.15 10*3/MM3 (ref 0.1–0.9)
MONOCYTES NFR BLD AUTO: 0.9 % (ref 5–12)
NEUTROPHILS NFR BLD AUTO: 19.3 % (ref 42.7–76)
NEUTROPHILS NFR BLD AUTO: 3.1 10*3/MM3 (ref 1.7–7)
NRBC BLD AUTO-RTO: 0 /100 WBC (ref 0–0.2)
PLATELET # BLD AUTO: 145 10*3/MM3 (ref 140–450)
PMV BLD AUTO: 9.6 FL (ref 6–12)
RBC # BLD AUTO: 4.01 10*6/MM3 (ref 3.77–5.28)
WBC NRBC COR # BLD AUTO: 16.13 10*3/MM3 (ref 3.4–10.8)

## 2024-02-08 PROCEDURE — 82784 ASSAY IGA/IGD/IGG/IGM EACH: CPT | Performed by: INTERNAL MEDICINE

## 2024-02-08 PROCEDURE — A9270 NON-COVERED ITEM OR SERVICE: HCPCS | Performed by: INTERNAL MEDICINE

## 2024-02-08 PROCEDURE — 63710000001 HYDROXYZINE PAMOATE PER 25 MG: Performed by: INTERNAL MEDICINE

## 2024-02-08 PROCEDURE — 63710000001 ACETAMINOPHEN 325 MG TABLET: Performed by: INTERNAL MEDICINE

## 2024-02-08 PROCEDURE — 25810000003 SODIUM CHLORIDE 0.9 % SOLUTION: Performed by: INTERNAL MEDICINE

## 2024-02-08 PROCEDURE — 85025 COMPLETE CBC W/AUTO DIFF WBC: CPT

## 2024-02-08 PROCEDURE — 96365 THER/PROPH/DIAG IV INF INIT: CPT

## 2024-02-08 PROCEDURE — 96366 THER/PROPH/DIAG IV INF ADDON: CPT

## 2024-02-08 PROCEDURE — 25010000002 IMMUNE GLOBULIN (HUMAN) 30 GM/300ML SOLUTION: Performed by: INTERNAL MEDICINE

## 2024-02-08 RX ORDER — HYDROXYZINE PAMOATE 25 MG/1
25 CAPSULE ORAL ONCE
Status: COMPLETED | OUTPATIENT
Start: 2024-02-08 | End: 2024-02-08

## 2024-02-08 RX ORDER — SODIUM CHLORIDE 9 MG/ML
250 INJECTION, SOLUTION INTRAVENOUS ONCE
Status: COMPLETED | OUTPATIENT
Start: 2024-02-08 | End: 2024-02-08

## 2024-02-08 RX ORDER — ACETAMINOPHEN 325 MG/1
650 TABLET ORAL ONCE
Status: COMPLETED | OUTPATIENT
Start: 2024-02-08 | End: 2024-02-08

## 2024-02-08 RX ADMIN — ACETAMINOPHEN 650 MG: 325 TABLET ORAL at 09:20

## 2024-02-08 RX ADMIN — HYDROXYZINE PAMOATE 25 MG: 25 CAPSULE ORAL at 09:20

## 2024-02-08 RX ADMIN — SODIUM CHLORIDE 250 ML: 9 INJECTION, SOLUTION INTRAVENOUS at 09:44

## 2024-02-08 RX ADMIN — IMMUNE GLOBULIN INFUSION (HUMAN) 30 G: 100 INJECTION, SOLUTION INTRAVENOUS; SUBCUTANEOUS at 09:43

## 2024-02-16 ENCOUNTER — ANTICOAGULATION VISIT (OUTPATIENT)
Dept: PHARMACY | Facility: HOSPITAL | Age: 89
End: 2024-02-16
Payer: MEDICARE

## 2024-02-16 DIAGNOSIS — I48.0 PAROXYSMAL ATRIAL FIBRILLATION: Primary | ICD-10-CM

## 2024-02-16 LAB — INR PPP: 2

## 2024-03-07 ENCOUNTER — OFFICE VISIT (OUTPATIENT)
Dept: ONCOLOGY | Facility: CLINIC | Age: 89
End: 2024-03-07
Payer: MEDICARE

## 2024-03-07 ENCOUNTER — INFUSION (OUTPATIENT)
Dept: ONCOLOGY | Facility: HOSPITAL | Age: 89
End: 2024-03-07
Payer: MEDICARE

## 2024-03-07 ENCOUNTER — LAB (OUTPATIENT)
Dept: LAB | Facility: HOSPITAL | Age: 89
End: 2024-03-07
Payer: MEDICARE

## 2024-03-07 VITALS
DIASTOLIC BLOOD PRESSURE: 76 MMHG | BODY MASS INDEX: 35.07 KG/M2 | WEIGHT: 190.6 LBS | TEMPERATURE: 97 F | HEART RATE: 91 BPM | SYSTOLIC BLOOD PRESSURE: 117 MMHG | HEIGHT: 62 IN | OXYGEN SATURATION: 96 % | RESPIRATION RATE: 18 BRPM

## 2024-03-07 VITALS — DIASTOLIC BLOOD PRESSURE: 80 MMHG | SYSTOLIC BLOOD PRESSURE: 151 MMHG | HEART RATE: 90 BPM

## 2024-03-07 DIAGNOSIS — C91.12 CLL (CHRONIC LYMPHOID LEUKEMIA) IN RELAPSE: Primary | ICD-10-CM

## 2024-03-07 DIAGNOSIS — C91.12 CLL (CHRONIC LYMPHOID LEUKEMIA) IN RELAPSE: ICD-10-CM

## 2024-03-07 DIAGNOSIS — D80.1 HYPOGAMMAGLOBULINEMIA: ICD-10-CM

## 2024-03-07 LAB
BASOPHILS # BLD AUTO: 0.01 10*3/MM3 (ref 0–0.2)
BASOPHILS NFR BLD AUTO: 0.1 % (ref 0–1.5)
DEPRECATED RDW RBC AUTO: 54.1 FL (ref 37–54)
EOSINOPHIL # BLD AUTO: 0 10*3/MM3 (ref 0–0.4)
EOSINOPHIL NFR BLD AUTO: 0 % (ref 0.3–6.2)
ERYTHROCYTE [DISTWIDTH] IN BLOOD BY AUTOMATED COUNT: 14.7 % (ref 12.3–15.4)
HCT VFR BLD AUTO: 40.9 % (ref 34–46.6)
HGB BLD-MCNC: 13.4 G/DL (ref 12–15.9)
IGA1 MFR SER: <50 MG/DL (ref 70–400)
IGG1 SER-MCNC: 1099 MG/DL (ref 700–1600)
IGM SERPL-MCNC: 27 MG/DL (ref 40–230)
IMM GRANULOCYTES # BLD AUTO: 0.08 10*3/MM3 (ref 0–0.05)
IMM GRANULOCYTES NFR BLD AUTO: 0.6 % (ref 0–0.5)
LYMPHOCYTES # BLD AUTO: 10.81 10*3/MM3 (ref 0.7–3.1)
LYMPHOCYTES NFR BLD AUTO: 75.9 % (ref 19.6–45.3)
MCH RBC QN AUTO: 32.7 PG (ref 26.6–33)
MCHC RBC AUTO-ENTMCNC: 32.8 G/DL (ref 31.5–35.7)
MCV RBC AUTO: 99.8 FL (ref 79–97)
MONOCYTES # BLD AUTO: 0.21 10*3/MM3 (ref 0.1–0.9)
MONOCYTES NFR BLD AUTO: 1.5 % (ref 5–12)
NEUTROPHILS NFR BLD AUTO: 21.9 % (ref 42.7–76)
NEUTROPHILS NFR BLD AUTO: 3.13 10*3/MM3 (ref 1.7–7)
NRBC BLD AUTO-RTO: 0.2 /100 WBC (ref 0–0.2)
PLATELET # BLD AUTO: 140 10*3/MM3 (ref 140–450)
PMV BLD AUTO: 10.2 FL (ref 6–12)
RBC # BLD AUTO: 4.1 10*6/MM3 (ref 3.77–5.28)
WBC NRBC COR # BLD AUTO: 14.24 10*3/MM3 (ref 3.4–10.8)

## 2024-03-07 PROCEDURE — 63710000001 HYDROXYZINE PAMOATE PER 25 MG: Performed by: INTERNAL MEDICINE

## 2024-03-07 PROCEDURE — 96366 THER/PROPH/DIAG IV INF ADDON: CPT

## 2024-03-07 PROCEDURE — 63710000001 ACETAMINOPHEN 325 MG TABLET: Performed by: INTERNAL MEDICINE

## 2024-03-07 PROCEDURE — 25810000003 SODIUM CHLORIDE 0.9 % SOLUTION: Performed by: INTERNAL MEDICINE

## 2024-03-07 PROCEDURE — A9270 NON-COVERED ITEM OR SERVICE: HCPCS | Performed by: INTERNAL MEDICINE

## 2024-03-07 PROCEDURE — 96365 THER/PROPH/DIAG IV INF INIT: CPT

## 2024-03-07 PROCEDURE — 82784 ASSAY IGA/IGD/IGG/IGM EACH: CPT | Performed by: INTERNAL MEDICINE

## 2024-03-07 PROCEDURE — 25010000002 IMMUNE GLOBULIN (HUMAN) 30 GM/300ML SOLUTION: Performed by: INTERNAL MEDICINE

## 2024-03-07 PROCEDURE — 85025 COMPLETE CBC W/AUTO DIFF WBC: CPT

## 2024-03-07 RX ORDER — ACETAMINOPHEN 325 MG/1
650 TABLET ORAL ONCE
Status: CANCELLED | OUTPATIENT
Start: 2024-03-07

## 2024-03-07 RX ORDER — HYDROXYZINE PAMOATE 25 MG/1
25 CAPSULE ORAL ONCE
Status: CANCELLED | OUTPATIENT
Start: 2024-03-07

## 2024-03-07 RX ORDER — FAMOTIDINE 10 MG/ML
20 INJECTION, SOLUTION INTRAVENOUS AS NEEDED
Status: CANCELLED | OUTPATIENT
Start: 2024-03-07

## 2024-03-07 RX ORDER — SODIUM CHLORIDE 9 MG/ML
250 INJECTION, SOLUTION INTRAVENOUS ONCE
Status: COMPLETED | OUTPATIENT
Start: 2024-03-07 | End: 2024-03-07

## 2024-03-07 RX ORDER — MEPERIDINE HYDROCHLORIDE 50 MG/ML
25 INJECTION INTRAMUSCULAR; INTRAVENOUS; SUBCUTANEOUS
Status: CANCELLED | OUTPATIENT
Start: 2024-03-07 | End: 2024-03-08

## 2024-03-07 RX ORDER — HYDROXYZINE PAMOATE 25 MG/1
25 CAPSULE ORAL ONCE
Status: COMPLETED | OUTPATIENT
Start: 2024-03-07 | End: 2024-03-07

## 2024-03-07 RX ORDER — DIPHENHYDRAMINE HYDROCHLORIDE 50 MG/ML
50 INJECTION INTRAMUSCULAR; INTRAVENOUS AS NEEDED
Status: CANCELLED | OUTPATIENT
Start: 2024-03-07

## 2024-03-07 RX ORDER — ACETAMINOPHEN 325 MG/1
650 TABLET ORAL ONCE
Status: COMPLETED | OUTPATIENT
Start: 2024-03-07 | End: 2024-03-07

## 2024-03-07 RX ORDER — SODIUM CHLORIDE 9 MG/ML
250 INJECTION, SOLUTION INTRAVENOUS ONCE
Status: CANCELLED | OUTPATIENT
Start: 2024-03-07

## 2024-03-07 RX ADMIN — IMMUNE GLOBULIN INFUSION (HUMAN) 30 G: 100 INJECTION, SOLUTION INTRAVENOUS; SUBCUTANEOUS at 10:58

## 2024-03-07 RX ADMIN — HYDROXYZINE PAMOATE 25 MG: 25 CAPSULE ORAL at 10:31

## 2024-03-07 RX ADMIN — SODIUM CHLORIDE 250 ML: 9 INJECTION, SOLUTION INTRAVENOUS at 10:31

## 2024-03-07 RX ADMIN — ACETAMINOPHEN 650 MG: 325 TABLET ORAL at 10:31

## 2024-03-07 NOTE — PROGRESS NOTES
Subjective .     REASONS FOR FOLLOW UP:  1. chronic lymphocytic leukemia with recurrent lung infections  2.  Hypogammaglobulinemia    Referring MD:    Amarilis Suarez MD    History of Present Illness patient is an.age female with 2014 with stage 0 CLL which has never required treatments.    In 2019 she  had multiple hospitalizations for lung infections predominantly viral probably also bacterial and at her last hospitalization in October we rechecked her gammaglobulins which were low and opted to start IV IgG monthly to see if this would keep her out of the hospital. She has continued on monthly IVIG since from October through March .  The patient has not had any recurrent infections/hospitalizations since initiating IVIG.    As she is reviewed back today, she is doing well overall.  Tolerating IVIG well.  We reviewed her CBC showing slight trend in her WBC and absolute lymphocyte count. Denies any B symptoms. No change in appetite or weight loss, no night sweats or fever.  Denies any new palpable adenopathy she is feeling about the same, noting that she does not sleep well but this is a longstanding chronic issue.    Denies other concerns at this time.    She does want to continue treatment as long as her quality of life is good    Past Medical History:   Diagnosis Date    Aortic calcification     mild, on echo 12/17/2017    Aortic regurgitation     Trace    Asthma     Atrial fibrillation     CAD (coronary artery disease)     Carpal tunnel syndrome of left wrist     Chronic combined systolic and diastolic congestive heart failure     CKD (chronic kidney disease) stage 3, GFR 30-59 ml/min     COPD (chronic obstructive pulmonary disease)     Coronary artery disease involving native coronary artery of native heart with angina pectoris     Disc degeneration, lumbar     Diverticulosis     DM type 2 (diabetes mellitus, type 2)     GERD (gastroesophageal reflux disease)     History of aneurysm     right femoral artery s/p  [facility]    History of blood transfusion     History of fracture     History of heart attack     History of vitamin D deficiency     Hyperlipidemia     Hypertension     Leukemia     Mild mitral regurgitation     Mitral annular calcification     12/8/2017- echo, moderate    Osteopenia     PAF (paroxysmal atrial fibrillation)     Peripheral neuropathy     Skin cancer     Left hand    Sleep apnea     bipap    SSS (sick sinus syndrome)     Stroke (cerebrum)     TIA (transient ischemic attack) 2017    Tricuspid regurgitation     Trace       ONCOLOGIC HISTORY:    [facility] HISTORY  Patient is an 85-year-old female whom I had seen in 2014 when she was hospitalized at [facility] and was diagnosed with chronic lymphocytic leukemia.  She has not been to see me in over 4 years and is following with Dr. Suarez her primary care doctor and her white count is gone up a little higher than usual to 22,000 and she is referred back to us for evaluation.  We are doing a telephone visit because of the coronavirus pandemic and her age and susceptibility.    When I originally saw her in 2014 she was brought into the ER unresponsive in septic shock on 03/25/2014 with methicillin-resistant staphylococcus identified in her blood cultures. The patient has been on antibiotic with improvement, but had some residual kidney damage with a creatinine in the 4-range requiring hemodialysis, and was noted on admission to have an elevated white count with lymphocytosis, normal hemoglobin, but a platelet count of 95,000, and over the course of the illness her platelet count normalized and the white count had gone up into the 20,000 range with lymphocytosis. A flow cytometry was sent which is consistent with CLL, she came to our office once or twice and then stopped coming because she was so stable     She tells me now she was hospitalized recently with rhinovirus infection and has had diagnosed with asthma and is having a lot of respiratory issues but does not  require oxygen.She is at the [facility] in independent living and managing fairly well    She denies fever sweats or weight loss.  Her last white count was on 5/26/2020  Showed a white count of 18,000 with 66 lymphs and 27 neutrophils platelet white hemoglobin is 12.6 platelet 188,000    I reassured Christopher that no treatment is indicated for this level of leukocytosis from CLL and if she has no systemic complaints I do not feel strongly about doing CAT scans at her age but I would like to physically examine her in the office in a couple of months to make sure there is no obvious adenopathy or hepatosplenomegaly.    She does have recurrent infections and we can check quantitative immunoglobulins to see how low they are as another adjunctive option to prevent recurrent infections if she has hypogammaglobulinemia    She remains on Coumadin for atrial fibrillation      Current Outpatient Medications on File Prior to Visit   Medication Sig Dispense Refill    acetaminophen (TYLENOL) 325 MG tablet Take 2 tablets by mouth Every 4 (Four) Hours As Needed for Mild Pain .      acetylcysteine (MUCOMYST) 20 % nebulizer solution Take 2 mL by nebulization 4 (Four) Times a Day.      albuterol (PROVENTIL) (2.5 MG/3ML) 0.083% nebulizer solution Take 2.5 mg by nebulization Every 4 (Four) Hours.      albuterol sulfate  (90 Base) MCG/ACT inhaler Inhale 2 puffs Every 4 (Four) Hours As Needed for Wheezing. 1 inhaler 3    amoxicillin (AMOXIL) 500 MG capsule Take 1 capsule by mouth. Before dental procedures/cleanings      aspirin 81 MG EC tablet tablet      Benralizumab (FASENRA SC) Inject  under the skin into the appropriate area as directed. Gets one shot a month, next shot may 13 then one every 8 weeks      Calcium Carbonate-Vitamin D (calcium-vitamin D) 500-200 MG-UNIT tablet per tablet Take 1 tablet by mouth.      carvedilol (COREG) 3.125 MG tablet TAKE 1 TABLET TWICE A  tablet 2    cephalexin (KEFLEX) 500 MG capsule TAKE  1 CAPSULE THREE TIMES A  capsule 3    Cetirizine HCl 10 MG capsule Take  by mouth.      ciclopirox (LOPROX) 0.77 % suspension Apply  topically to the appropriate area as directed.      fluticasone (FLONASE) 50 MCG/ACT nasal spray 1 spray into the nostril(s) as directed by provider Daily.      Fluticasone-Umeclidin-Vilant (TRELEGY) 100-62.5-25 MCG/INH inhaler Inhale 1 puff Daily. As directed      furosemide (LASIX) 20 MG tablet Take 1 tablet by mouth As Needed (For weight gain of greater than 2 pounds in 1 day or 5 pounds in 1 week). 30 tablet 11    glipizide (GLUCOTROL) 5 MG tablet Take 1 tablet by mouth. Take one half tablet by mouth daily      guaiFENesin (MUCINEX) 600 MG 12 hr tablet Take 1 tablet by mouth Every 12 (Twelve) Hours.      HyperSal 7 % nebulizer solution nebulizer solution USE 1 VIAL VIA NEBULIZER TWICE DAILY      ketoconazole (NIZORAL) 2 % cream 60      Loperamide HCl (IMODIUM PO) Take  by mouth Daily.      Loperamide-Simethicone (Imodium Multi-Symptom Relief) 2-125 MG tablet tablet      losartan (COZAAR) 25 MG tablet tablet      melatonin 5 MG tablet tablet Take 1 tablet by mouth Every Night.      metFORMIN ER (GLUCOPHAGE-XR) 500 MG 24 hr tablet       montelukast (SINGULAIR) 10 MG tablet Take 1 tablet by mouth Every Night.      mupirocin (BACTROBAN) 2 % ointment APPLY TO AFFECTED NOSTRIL TWICE DAILY FOR 10 DAYS      nystatin (MYCOSTATIN) 834152 UNIT/GM cream       oxybutynin XL (DITROPAN-XL) 10 MG 24 hr tablet Take 1 tablet by mouth every night at bedtime.      pantoprazole (PROTONIX) 40 MG EC tablet 90 tablets.      polyethyl glycol-propyl glycol (SYSTANE) 0.4-0.3 % solution ophthalmic solution (artificial tears) Administer 1 drop to both eyes Every 1 (One) Hour As Needed.      rosuvastatin (CRESTOR) 20 MG tablet Take 1 tablet by mouth Every Night.      warfarin (COUMADIN) 4 MG tablet TAKE ONE-HALF (1/2) TABLET ON SUNDAY AND FRIDAY AND TAKE ONE TABLET ALL OTHER DAYS OR AS DIRECTED 80 tablet  1     No current facility-administered medications on file prior to visit.       ALLERGIES:     Allergies   Allergen Reactions    Accupril [Quinapril Hcl] Swelling, Other (See Comments), GI Intolerance and Delirium     HA, constipation     Ahist [Chlorpheniramine] Nausea Only, Other (See Comments) and Dizziness     Headache, Blurred vision    Clarithromycin Nausea Only, Other (See Comments) and Mental Status Change     HA, Depression, Flushing    Esomeprazole GI Intolerance    Latex Other (See Comments)     Skin breakdown    Levalbuterol Swelling    Levocetirizine Diarrhea and GI Intolerance    Lipitor [Atorvastatin] Other (See Comments) and Myalgia     Dark urine    Omeprazole Nausea Only and Other (See Comments)     HA    Pravachol [Pravastatin] Nausea Only and GI Intolerance     Bloated, Constipation, HA    Sulindac Other (See Comments) and Myalgia     HA, joint pain, bruising    Valdecoxib Irritability    Chlorcyclizine Unknown - Low Severity    Diclofenac Sodium Unknown - Low Severity    Diphenhydramine Unknown - Low Severity    Lodine [Etodolac] Unknown - Low Severity    Sulfa Antibiotics Unknown - Low Severity       Social History     Socioeconomic History    Marital status: Single   Tobacco Use    Smoking status: Never    Smokeless tobacco: Never    Tobacco comments:     caffeine use- soda   Vaping Use    Vaping status: Never Used   Substance and Sexual Activity    Alcohol use: Never    Drug use: No    Sexual activity: Defer         Cancer-related family history includes Colon cancer in her sister.     I have reviewed the patient's medical history in detail and updated the computerized patient record.    Review of Systems  I have reviewed and confirmed the accuracy of the ROS as documented by the MA/GEORGE/JEANNINE Larkin MD      Objective      Vitals:    03/07/24 0958   BP: 117/76   Pulse: 91   Resp: 18   Temp: 97 °F (36.1 °C)   SpO2: 96%             3/7/2024     9:25 AM   Current Status   ECOG score 1      Pain Score    03/07/24 0958   PainSc: 0-No pain           CONSTITUTIONAL:  Vital signs reviewed.  No distress, looks comfortable.  EYES:  Conjunctiva and lids unremarkable.  PERRLA  EARS,NOSE,MOUTH,THROAT: Hearing intact.  Lips, teeth, gums appear unremarkable.  RESPIRATORY:  Normal respiratory effort.  Lungs clear to auscultation on the right decreased breath breath sounds lower half of the left lung  CARDIOVASCULAR:  Normal S1, S2.  No murmurs rubs or gallops.  No significant lower extremity edema.  GASTROINTESTINAL: Abdomen unremarkable.  Nontender. No hepatosplenomegaly.   LYMPHATIC:  No cervical, supraclavicular, inguinal lymphadenopathy.  SKIN:  Warm.  No rashes.  Numerous ecchymosis on her arms and legs  PSYCHIATRIC:  Normal judgment and insight.  Normal mood and affect.     I have reexamined the patient 03/07/2024 and the results are consistent with the previously documented exam except as updated. Lucien Larkin MD       RECENT LABS:  Results from last 7 days   Lab Units 03/07/24  0908   WBC 10*3/mm3 14.24*   NEUTROS ABS 10*3/mm3 3.13   HEMOGLOBIN g/dL 13.4   HEMATOCRIT % 40.9   PLATELETS 10*3/mm3 140               Assessment & Plan    1. Chronic lymphocytic leukemia  -stage 0 since 2014 untreated  White count increased to 32,000 in 12/22 after prednisone pack.    1/11/2024 reviewed with patient slight trend and WBC up to 18.25, ALC up to 14%.  She has no other concerning findings clinically and we discussed continued observation for now.    2.  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  The patient received IVIG treatments October 2020 through March 2021.  We have held IVIG since that time and the patient is happy to report no infections aside from some urinary tract infection which she has chronically.  Otherwise she has been feeling very well.  Resumes monthly IVIG from October through March on a yearly basis    3.  COPD/asthma  /emphysema on home O2-recurrent respiratory infections with RSV and rhinovirus and bacteria    4.  Atrial fibrillation on Coumadin.  Stable with no bleeding.    5.  Hypertension/hypercholesterolemia/type 2 diabetes on treatment    6.  Vaccinated against coronavirus        Plan:  Proceed with 30g IVIG for hypogammaglobulinemia.  This is being given monthly October through March Return in 1 month for IVIG 30 g   Return in 6 months for follow-up with Dr. Larkin and lfirst IVIG of this season.    Keep an eye on WBC/ALC in setting of CLL.  Discussed course of continued observation for now.      This patient is on high risk drug therapy requiring intensive monitoring for toxicity.        Lucien Larkin MD  03/07/2024

## 2024-03-15 ENCOUNTER — ANTICOAGULATION VISIT (OUTPATIENT)
Dept: PHARMACY | Facility: HOSPITAL | Age: 89
End: 2024-03-15
Payer: MEDICARE

## 2024-03-15 DIAGNOSIS — I48.0 PAROXYSMAL ATRIAL FIBRILLATION: Primary | ICD-10-CM

## 2024-03-15 LAB — INR PPP: 2.1

## 2024-03-15 NOTE — PROGRESS NOTES
Anticoagulation Clinic Progress Note    Anticoagulation Summary  As of 3/15/2024      INR goal:  2.0-3.0   TTR:  67.7% (5.3 y)   INR used for dosin.10 (3/15/2024)   Warfarin maintenance plan:  2 mg every Sun, Fri; 4 mg all other days   Weekly warfarin total:  24 mg   No change documented:  Concepcion Hager [facility]   Plan last modified:  Petra Oliveira RP (2023)   Next INR check:  2024   Priority:  Maintenance   Target end date:  Indefinite    Indications    Paroxysmal atrial fibrillation [I48.0]                 Anticoagulation Episode Summary       INR check location:      Preferred lab:      Send INR reminders to:  [facility]  POOL    Comments:  [facility] beginning 20          Anticoagulation Care Providers       Provider Role Specialty Phone number    Jonah Regalado Jr., MD Referring Cardiology [phone]            Drug interactions: has remained unchanged.  Diet: has remained unchanged.    Clinic Interview:  No pertinent clinical findings have been reported.    INR History:      2024    11:42 AM 2024    12:00 AM 2024    10:18 AM 2024    12:00 AM 2024     9:05 AM 3/15/2024    12:00 AM 3/15/2024     9:50 AM   Anticoagulation Monitoring   INR 1.80  2.20  2.00  2.10   INR Date 2024  2024  2024  3/15/2024   INR Goal 2.0-3.0  2.0-3.0  2.0-3.0  2.0-3.0   Trend Same  Same  Same  Same   Last Week Total 24 mg  24 mg  24 mg  24 mg   Next Week Total 26 mg  24 mg  24 mg  24 mg   Sun 2 mg  2 mg  2 mg  2 mg   Mon 4 mg  4 mg  4 mg  4 mg   Tue 4 mg  4 mg  4 mg  4 mg   Wed 4 mg  4 mg  4 mg  4 mg   Thu 4 mg  4 mg  4 mg  4 mg   Fri 4 mg (); Otherwise 2 mg  2 mg  2 mg  2 mg   Sat 4 mg  4 mg  4 mg  4 mg   Historical INR  2.20      2.00      2.10            This result is from an external source.       Plan:  1. INR is Therapeutic today- see above in Anticoagulation Summary.    Pt reports no change/issues , cont same karissa in 4 wks (spoke w/ Caitlyn) see  above in Anticoagulation Summary.  2. Follow up in 4 weeks  3. Pt has agreed to only be called if INR out of range. They have been instructed to call if any changes in medications, doses, concerns, etc. Patient expresses understanding and has no further questions at this time.    Concepcion Hager, [facility]

## 2024-03-30 ENCOUNTER — HOSPITAL ENCOUNTER (EMERGENCY)
Facility: HOSPITAL | Age: 89
Discharge: HOME | End: 2024-03-30
Attending: EMERGENCY MEDICINE
Payer: MEDICARE

## 2024-03-30 VITALS
TEMPERATURE: 97.8 F | OXYGEN SATURATION: 96 % | RESPIRATION RATE: 18 BRPM | HEIGHT: 63 IN | BODY MASS INDEX: 33.13 KG/M2 | HEART RATE: 80 BPM | DIASTOLIC BLOOD PRESSURE: 73 MMHG | SYSTOLIC BLOOD PRESSURE: 120 MMHG | WEIGHT: 187 LBS

## 2024-03-30 DIAGNOSIS — L03.115 CELLULITIS OF RIGHT LOWER EXTREMITY: Primary | ICD-10-CM

## 2024-03-30 LAB
ANION GAP SERPL CALCULATED.3IONS-SCNC: 12 MMOL/L (ref 5–15)
BUN SERPL-MCNC: 20 MG/DL (ref 8–23)
BUN/CREAT SERPL: 23 (ref 7–25)
CALCIUM SPEC-SCNC: 8.2 MG/DL (ref 8.6–10.5)
CHLORIDE SERPL-SCNC: 108 MMOL/L (ref 98–107)
CO2 SERPL-SCNC: 24 MMOL/L (ref 22–29)
CREAT SERPL-MCNC: 0.87 MG/DL (ref 0.57–1)
CRP SERPL-MCNC: 0.31 MG/DL (ref 0–0.5)
DEPRECATED RDW RBC AUTO: 51.5 FL (ref 37–54)
EGFRCR SERPLBLD CKD-EPI 2021: 63.8 ML/MIN/1.73
ELLIPTOCYTES BLD QL SMEAR: ABNORMAL
ERYTHROCYTE [DISTWIDTH] IN BLOOD BY AUTOMATED COUNT: 13.6 % (ref 12.3–15.4)
ERYTHROCYTE [SEDIMENTATION RATE] IN BLOOD: 19 MM/HR (ref 0–30)
GLUCOSE SERPL-MCNC: 179 MG/DL (ref 65–99)
HCT VFR BLD AUTO: 41.1 % (ref 34–46.6)
HGB BLD-MCNC: 12.9 G/DL (ref 12–15.9)
HOLD SPECIMEN: NORMAL
INR PPP: 2.28 (ref 0.9–1.1)
LYMPHOCYTES # BLD MANUAL: 9.52 10*3/MM3 (ref 0.7–3.1)
LYMPHOCYTES NFR BLD MANUAL: 4 % (ref 5–12)
MAGNESIUM SERPL-MCNC: 1.6 MG/DL (ref 1.6–2.4)
MCH RBC QN AUTO: 31.9 PG (ref 26.6–33)
MCHC RBC AUTO-ENTMCNC: 31.4 G/DL (ref 31.5–35.7)
MCV RBC AUTO: 101.5 FL (ref 79–97)
MONOCYTES # BLD: 0.58 10*3/MM3 (ref 0.1–0.9)
NEUTROPHILS # BLD AUTO: 4.33 10*3/MM3 (ref 1.7–7)
NEUTROPHILS NFR BLD MANUAL: 30 % (ref 42.7–76)
OVALOCYTES BLD QL SMEAR: ABNORMAL
PLAT MORPH BLD: NORMAL
PLATELET # BLD AUTO: 145 10*3/MM3 (ref 140–450)
PMV BLD AUTO: 9.9 FL (ref 6–12)
POIKILOCYTOSIS BLD QL SMEAR: ABNORMAL
POTASSIUM SERPL-SCNC: 3.5 MMOL/L (ref 3.5–5.2)
PROTHROMBIN TIME: 25.4 SECONDS (ref 11.7–14.2)
RBC # BLD AUTO: 4.05 10*6/MM3 (ref 3.77–5.28)
SODIUM SERPL-SCNC: 144 MMOL/L (ref 136–145)
VARIANT LYMPHS NFR BLD MANUAL: 66 % (ref 19.6–45.3)
WBC MORPH BLD: NORMAL
WBC NRBC COR # BLD AUTO: 14.42 10*3/MM3 (ref 3.4–10.8)

## 2024-03-30 PROCEDURE — 80048 BASIC METABOLIC PNL TOTAL CA: CPT | Performed by: EMERGENCY MEDICINE

## 2024-03-30 PROCEDURE — 85652 RBC SED RATE AUTOMATED: CPT | Performed by: EMERGENCY MEDICINE

## 2024-03-30 PROCEDURE — 85007 BL SMEAR W/DIFF WBC COUNT: CPT | Performed by: EMERGENCY MEDICINE

## 2024-03-30 PROCEDURE — 25010000002 CEFAZOLIN PER 500 MG: Performed by: EMERGENCY MEDICINE

## 2024-03-30 PROCEDURE — 86140 C-REACTIVE PROTEIN: CPT | Performed by: EMERGENCY MEDICINE

## 2024-03-30 PROCEDURE — 85025 COMPLETE CBC W/AUTO DIFF WBC: CPT | Performed by: EMERGENCY MEDICINE

## 2024-03-30 PROCEDURE — 99283 EMERGENCY DEPT VISIT LOW MDM: CPT

## 2024-03-30 PROCEDURE — 85610 PROTHROMBIN TIME: CPT | Performed by: EMERGENCY MEDICINE

## 2024-03-30 PROCEDURE — 83735 ASSAY OF MAGNESIUM: CPT | Performed by: EMERGENCY MEDICINE

## 2024-03-30 RX ORDER — MUPIROCIN CALCIUM 20 MG/G
1 CREAM TOPICAL 3 TIMES DAILY
COMMUNITY

## 2024-03-30 RX ORDER — CEPHALEXIN 500 MG/1
500 CAPSULE ORAL 3 TIMES DAILY
Qty: 21 CAPSULE | Refills: 0 | Status: SHIPPED | OUTPATIENT
Start: 2024-03-30

## 2024-03-30 RX ORDER — REVEFENACIN 175 UG/3ML
SOLUTION RESPIRATORY (INHALATION)
COMMUNITY

## 2024-03-30 RX ORDER — SODIUM CHLORIDE 0.9 % (FLUSH) 0.9 %
10 SYRINGE (ML) INJECTION AS NEEDED
Status: DISCONTINUED | OUTPATIENT
Start: 2024-03-30 | End: 2024-03-31 | Stop reason: HOSPADM

## 2024-03-30 RX ORDER — BUDESONIDE 3 MG/1
6 CAPSULE, COATED PELLETS ORAL EVERY MORNING
COMMUNITY

## 2024-03-30 RX ADMIN — SODIUM CHLORIDE 2000 MG: 900 INJECTION INTRAVENOUS at 21:19

## 2024-03-31 NOTE — DISCHARGE INSTRUCTIONS
Rest at home avoiding any particularly strenuous activity today.     Eat small, frequent meals and drink plenty of fluids. Take any medication prescribed as instructed. If given antibiotics, then finish the full course of them.    Your pharmacy is only open 10am - 2pm because of the holiday.    Monitor for any signs of recurrent symptoms or worsening and see your primary doctor to discuss your ER visit while returning to the ER if any concerns as we discussed.

## 2024-03-31 NOTE — ED PROVIDER NOTES
EMERGENCY DEPARTMENT ENCOUNTER    Room Number:  38/38  PCP: Zenaida Suarez MD  Independent Historians: Patient    HPI:  Chief Complaint: had concerns including Wound Check.      A complete HPI/ROS/PMH/PSH/SH/FH are unobtainable due to: None    Chronic or social conditions impacting patient care (Social Determinants of Health): None      Context: Caitlyn Longoria is a 89 y.o. female with a medical history of A-fib on warfarin, diabetes, hypertension, hyperlipidemia who presents to the ED c/o acute right lower extremity infection that started about a week and a half ago.  Patient says she was putting on her stockings and must of scratched her anterior right lower leg because a friend noted that it was bleeding through her sock later in the day.  Thereafter it became irritated around it and red.  Patient went to urgent care on Wednesday and was prescribed some mupirocin ointment.  Despite using that twice daily she said the area around it has gotten more raw and red and more painful.  She even had some tightening more proximally in the lower leg but no numbness or tingling.  She denies any fevers or vomiting.  Patient is anticoagulated on warfarin long-term.  She has not been on any oral antibiotics.        Review of prior external notes (non-ED) -and- Review of prior external test results outside of this encounter: Patient sees anticoagulation clinic routinely and last had her INR checked on March 15 and it was 2.1    Prescription drug monitoring program review:     N/A    PAST MEDICAL HISTORY  Active Ambulatory Problems     Diagnosis Date Noted    Neck and shoulder pain 03/24/2017    Arthropathy of shoulder region 03/24/2017    Carpal tunnel syndrome of left wrist 03/24/2017    Chronic pain of both shoulders 06/27/2017    Chronic left shoulder pain 12/05/2017    Arthropathy of left shoulder 12/05/2017    Dysarthria 12/07/2017    DM II (diabetes mellitus, type II), controlled 12/07/2017    Paroxysmal  atrial fibrillation 12/07/2017    HLD (hyperlipidemia) 12/07/2017    Chronic bronchitis 12/07/2017    Coronary artery disease involving native coronary artery of native heart with angina pectoris 12/07/2017    CVA (cerebral vascular accident) 12/10/2017    HTN (hypertension) 01/14/2018    CKD (chronic kidney disease), stage III 01/14/2018    Chronic combined systolic and diastolic congestive heart failure 01/15/2018    Infection of prosthetic right knee joint 01/17/2018    Stasis dermatitis of right lower extremity due to peripheral venous hypertension 04/28/2018    Current use of long term anticoagulation 04/28/2018    Morbid obesity 02/08/2019    Acute respiratory failure with hypoxia 09/16/2019    Rhinovirus 02/11/2020    Asthma with acute exacerbation 02/11/2020    Acute respiratory failure with hypoxemia 02/12/2020    Viral pneumonia 04/23/2020    Hypoxia 08/29/2020    CLL (chronic lymphoid leukemia) in relapse 08/31/2020    Dyspnea 09/29/2020    Anticoagulated on Coumadin     Osteoporotic compression fracture of spine 09/30/2020    Pneumonia due to gram-negative bacteria 10/02/2020    Pneumonia due to infectious organism 09/29/2020    Bilateral carotid artery disease 10/28/2020    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     Resolved Ambulatory Problems     Diagnosis Date Noted    Hypernatremia 01/15/2018    Shortness of breath 04/28/2020     Past Medical History:   Diagnosis Date    Aortic calcification     Aortic regurgitation     Asthma     Atrial fibrillation     CAD (coronary artery disease)     CKD (chronic kidney disease) stage 3, GFR 30-59 ml/min     COPD (chronic obstructive pulmonary disease)     Disc degeneration, lumbar     Diverticulosis     DM type 2 (diabetes mellitus, type 2)     GERD (gastroesophageal reflux disease)     History of aneurysm     History of blood transfusion     History of fracture     History of heart attack     History of vitamin D deficiency      Hyperlipidemia     Hypertension     Leukemia     Mild mitral regurgitation     Mitral annular calcification     Osteopenia     PAF (paroxysmal atrial fibrillation)     Peripheral neuropathy     Skin cancer     Sleep apnea     SSS (sick sinus syndrome)     Stroke (cerebrum)     TIA (transient ischemic attack) 2017    Tricuspid regurgitation          PAST SURGICAL HISTORY  Past Surgical History:   Procedure Laterality Date    BRONCHOSCOPY Bilateral 10/6/2020    Procedure: BRONCHOSCOPY with BILATERAL LUNG washings;  Surgeon: Juno Coburn MD;  Location: [facility] ENDOSCOPY;  Service: Pulmonary;  Laterality: Bilateral;  PRE: purulent bronchitis  POST: PURULENT BRONCHITIS    BRONCHOSCOPY Bilateral 10/9/2020    Procedure: BRONCHOSCOPY with washing;  Surgeon: Juno Coburn MD;  Location: [facility] ENDOSCOPY;  Service: Pulmonary;  Laterality: Bilateral;  pre/post - mucous plug      CARDIAC CATHETERIZATION      CARDIAC ELECTROPHYSIOLOGY PROCEDURE N/A 2/7/2020    Procedure: PPM generator change - dual  medtronic;  Surgeon: Kiel Field MD;  Location: [facility] CATH INVASIVE LOCATION;  Service: Cardiology;  Laterality: N/A;    CHOLECYSTECTOMY      CORONARY STENT PLACEMENT      ENDOSCOPY N/A 9/14/2022    Procedure: ESOPHAGOGASTRODUODENOSCOPY with 54fr main dilatation;  Surgeon: Enio Cota MD;  Location: [facility] ENDOSCOPY;  Service: Gastroenterology;  Laterality: N/A;  pre - dysphagia  post - s/p dilatation, watermelon stomach    HERNIA REPAIR      hital hernia    HYSTERECTOMY      PACEMAKER IMPLANTATION      REPLACEMENT TOTAL KNEE Bilateral          FAMILY HISTORY  Family History   Problem Relation Age of Onset    Heart disease Mother     Hypertension Mother     Colon polyps Mother     Heart disease Father     Hypertension Father     Stroke Father     Hypertension Brother     Heart disease Brother     Diabetes Niece     Colon cancer Sister     Hypertension Sister     Hypertension Daughter     Hypertension Son      Hypertension Maternal Aunt     Hypertension Maternal Grandmother     Hypertension Maternal Grandfather     Hypertension Paternal Grandmother     Hypertension Paternal Grandfather          SOCIAL HISTORY  Social History     Socioeconomic History    Marital status: Single   Tobacco Use    Smoking status: Never    Smokeless tobacco: Never    Tobacco comments:     caffeine use- soda   Vaping Use    Vaping status: Never Used   Substance and Sexual Activity    Alcohol use: Never    Drug use: No    Sexual activity: Defer         ALLERGIES  Accupril [quinapril hcl], Ahist [chlorpheniramine], Clarithromycin, Esomeprazole, Latex, Levalbuterol, Levocetirizine, Lipitor [atorvastatin], Omeprazole, Pravachol [pravastatin], Sulindac, Valdecoxib, Chlorcyclizine, Diclofenac sodium, Diphenhydramine, Lodine [etodolac], and Sulfa antibiotics        REVIEW OF SYSTEMS  Review of Systems  Included in HPI  All systems reviewed and negative except for those discussed in HPI.      PHYSICAL EXAM    I have reviewed the triage vital signs and nursing notes.    ED Triage Vitals [03/30/24 1957]   Temp Heart Rate Resp BP SpO2   97.8 °F (36.6 °C) 99 18 -- 95 %      Temp src Heart Rate Source Patient Position BP Location FiO2 (%)   Tympanic Monitor -- -- --       Physical Exam  GENERAL: Pleasant cooperative obese female, alert, no acute distress  SKIN: Warm, dry, right lower extremity with distal medial open wound with surrounding erythema and warmth with no fluctuance and no drainage  HENT: Normocephalic, atraumatic  MUSCULOSKELETAL: Bilateral lower extremity edema with right greater than left and skin changes as noted above, 1+ PT pulses bilateral lower extremities  NEURO: alert, moves all extremities, follows commands                                                              LAB RESULTS  Recent Results (from the past 24 hour(s))   Basic Metabolic Panel    Collection Time: 03/30/24  8:41 PM    Specimen: Blood   Result Value Ref Range    Glucose  179 (H) 65 - 99 mg/dL    BUN 20 8 - 23 mg/dL    Creatinine 0.87 0.57 - 1.00 mg/dL    Sodium 144 136 - 145 mmol/L    Potassium 3.5 3.5 - 5.2 mmol/L    Chloride 108 (H) 98 - 107 mmol/L    CO2 24.0 22.0 - 29.0 mmol/L    Calcium 8.2 (L) 8.6 - 10.5 mg/dL    BUN/Creatinine Ratio 23.0 7.0 - 25.0    Anion Gap 12.0 5.0 - 15.0 mmol/L    eGFR 63.8 >60.0 mL/min/1.73   Protime-INR    Collection Time: 03/30/24  8:41 PM    Specimen: Blood   Result Value Ref Range    Protime 25.4 (H) 11.7 - 14.2 Seconds    INR 2.28 (H) 0.90 - 1.10   C-reactive Protein    Collection Time: 03/30/24  8:41 PM    Specimen: Blood   Result Value Ref Range    C-Reactive Protein 0.31 0.00 - 0.50 mg/dL   Sedimentation Rate    Collection Time: 03/30/24  8:41 PM    Specimen: Blood   Result Value Ref Range    Sed Rate 19 0 - 30 mm/hr   Magnesium    Collection Time: 03/30/24  8:41 PM    Specimen: Blood   Result Value Ref Range    Magnesium 1.6 1.6 - 2.4 mg/dL   CBC Auto Differential    Collection Time: 03/30/24  8:41 PM    Specimen: Blood   Result Value Ref Range    WBC 14.42 (H) 3.40 - 10.80 10*3/mm3    RBC 4.05 3.77 - 5.28 10*6/mm3    Hemoglobin 12.9 12.0 - 15.9 g/dL    Hematocrit 41.1 34.0 - 46.6 %    .5 (H) 79.0 - 97.0 fL    MCH 31.9 26.6 - 33.0 pg    MCHC 31.4 (L) 31.5 - 35.7 g/dL    RDW 13.6 12.3 - 15.4 %    RDW-SD 51.5 37.0 - 54.0 fl    MPV 9.9 6.0 - 12.0 fL    Platelets 145 140 - 450 10*3/mm3   Manual Differential    Collection Time: 03/30/24  8:41 PM    Specimen: Blood   Result Value Ref Range    Neutrophil % 30.0 (L) 42.7 - 76.0 %    Lymphocyte % 66.0 (H) 19.6 - 45.3 %    Monocyte % 4.0 (L) 5.0 - 12.0 %    Neutrophils Absolute 4.33 1.70 - 7.00 10*3/mm3    Lymphocytes Absolute 9.52 (H) 0.70 - 3.10 10*3/mm3    Monocytes Absolute 0.58 0.10 - 0.90 10*3/mm3    Elliptocytes Slight/1+ None Seen    Ovalocytes Mod/2+ None Seen    Poikilocytes Mod/2+ None Seen    WBC Morphology Normal Normal    Platelet Morphology Normal Normal         RADIOLOGY  No  Radiology Exams Resulted Within Past 24 Hours      MEDICATIONS GIVEN IN ER  Medications   sodium chloride 0.9 % flush 10 mL (has no administration in time range)   ceFAZolin 2000 mg IVPB in 100 mL NS (MBP) (2,000 mg Intravenous Given 3/30/24 2119)         ORDERS PLACED DURING THIS VISIT:  Orders Placed This Encounter   Procedures    Basic Metabolic Panel    Protime-INR    C-reactive Protein    Sedimentation Rate    Magnesium    CBC Auto Differential    [place] Draw    Manual Differential    Insert Peripheral IV    CBC & Differential    Extra Tubes    Green Top (Gel)    Lavender Top    Red Top    Gold Top - SST    Green Top (No Gel)    Light Blue Top         OUTPATIENT MEDICATION MANAGEMENT:  Current Facility-Administered Medications Ordered in Epic   Medication Dose Route Frequency Provider Last Rate Last Admin    sodium chloride 0.9 % flush 10 mL  10 mL Intravenous PRN Susy Bullard MD         Current Outpatient Medications Ordered in Epic   Medication Sig Dispense Refill    acetaminophen (TYLENOL) 325 MG tablet Take 2 tablets by mouth Every 4 (Four) Hours As Needed for Mild Pain .      acetylcysteine (MUCOMYST) 20 % nebulizer solution Take 2 mL by nebulization 4 (Four) Times a Day.      albuterol (PROVENTIL) (2.5 MG/3ML) 0.083% nebulizer solution Take 2.5 mg by nebulization Every 4 (Four) Hours.      albuterol sulfate  (90 Base) MCG/ACT inhaler Inhale 2 puffs Every 4 (Four) Hours As Needed for Wheezing. 1 inhaler 3    Benralizumab (FASENRA SC) Inject  under the skin into the appropriate area as directed. Gets one shot a month, next shot may 13 then one every 8 weeks      Budesonide (ENTOCORT EC) 3 MG 24 hr capsule Take 2 capsules by mouth Every Morning.      Calcium Carbonate-Vitamin D (calcium-vitamin D) 500-200 MG-UNIT tablet per tablet Take 1 tablet by mouth.      cephalexin (KEFLEX) 500 MG capsule Take 1 capsule by mouth 3 (Three) Times a Day. 21 capsule 0    Cetirizine HCl 10 MG capsule Take   by mouth.      fluticasone (FLONASE) 50 MCG/ACT nasal spray 1 spray into the nostril(s) as directed by provider Daily.      furosemide (LASIX) 20 MG tablet Take 1 tablet by mouth As Needed (For weight gain of greater than 2 pounds in 1 day or 5 pounds in 1 week). 30 tablet 11    glipizide (GLUCOTROL) 5 MG tablet Take 1 tablet by mouth. Take one half tablet by mouth daily      guaiFENesin (MUCINEX) 600 MG 12 hr tablet Take 1 tablet by mouth Every 12 (Twelve) Hours.      HyperSal 7 % nebulizer solution nebulizer solution USE 1 VIAL VIA NEBULIZER TWICE DAILY      ketoconazole (NIZORAL) 2 % cream 60      Loperamide HCl (IMODIUM PO) Take  by mouth Daily.      Loperamide-Simethicone (Imodium Multi-Symptom Relief) 2-125 MG tablet tablet      melatonin 5 MG tablet tablet Take 1 tablet by mouth Every Night.      metFORMIN ER (GLUCOPHAGE-XR) 500 MG 24 hr tablet       montelukast (SINGULAIR) 10 MG tablet Take 1 tablet by mouth Every Night.      mupirocin (BACTROBAN) 2 % cream Apply 1 Application topically to the appropriate area as directed 3 (Three) Times a Day.      nystatin (MYCOSTATIN) 031661 UNIT/GM cream       oxybutynin XL (DITROPAN-XL) 10 MG 24 hr tablet Take 1 tablet by mouth every night at bedtime.      polyethyl glycol-propyl glycol (SYSTANE) 0.4-0.3 % solution ophthalmic solution (artificial tears) Administer 1 drop to both eyes Every 1 (One) Hour As Needed.      revefenacin (Yupelri) 175 MCG/3ML nebulizer solution Take  by nebulization Daily.      rosuvastatin (CRESTOR) 20 MG tablet Take 1 tablet by mouth Every Night.      warfarin (COUMADIN) 4 MG tablet TAKE ONE-HALF (1/2) TABLET ON SUNDAY AND FRIDAY AND TAKE ONE TABLET ALL OTHER DAYS OR AS DIRECTED 80 tablet 1         PROCEDURES  Procedures            PROGRESS, DATA ANALYSIS, CONSULTS, AND MEDICAL DECISION MAKING  All labs have been independently interpreted by me.  All radiology studies have been reviewed by me. All EKG's have been independently viewed and  interpreted by me.  Discussion below represents my analysis of pertinent findings related to patient's condition, differential diagnosis, treatment plan and final disposition.    Differential diagnosis includes but is not limited to   This patient presents with lower extremity erythema, warmth, and swelling concerning for cellulitis. Patient does have sensitivity/pain to light touch around the erythematous area. No streaking nor exam evidence of fluctuance concerning for abscess noted. Low concern for osteomyelitis or DVT (patient on long-term anticoagulation). No immune compromise, bullae, pain out of proportion, or rapid progression concerning for necrotizing fasciitis. Calf is soft and no concern for compartment syndrome.    Clinical Scores:                  ED Course as of 03/30/24 2150   Sat Mar 30, 2024   2150 I discussed all results with patient and answered all questions to the best of my ability. The patient was encouraged to follow up appropriately.      Routine discharge counseling was given, and the patient was instructed to promptly call or followup with their primary physician or even return to the ER if any worsening, changing or persistent symptoms.         [AR]      ED Course User Index  [AR] Susy Bullard MD             AS OF 21:50 EDT VITALS:    BP - 116/67  HR - 80  TEMP - 97.8 °F (36.6 °C) (Tympanic)  O2 SATS - 91%      COMPLEXITY OF CARE  Admission was considered but after careful review of the patient's presentation, physical examination, diagnostic results, and response to treatment the patient may be safely discharged with outpatient follow-up.    DIAGNOSIS  Final diagnoses:   Cellulitis of right lower extremity         DISPOSITION  ED Disposition       ED Disposition   Discharge    Condition   Stable    Comment   --                Please note that portions of this document were completed with a voice recognition program.    Note Disclaimer: At [facility], we believe that sharing  information builds trust and better relationships. You are receiving this note because you recently visited [facility]. It is possible you will see health information before a provider has talked with you about it. This kind of information can be easy to misunderstand. To help you fully understand what it means for your health, we urge you to discuss this note with your provider.         Susy Bullard MD  03/30/24 0850

## 2024-04-01 ENCOUNTER — NURSE TRIAGE (OUTPATIENT)
Dept: CALL CENTER | Facility: HOSPITAL | Age: 89
End: 2024-04-01
Payer: MEDICARE

## 2024-04-01 NOTE — TELEPHONE ENCOUNTER
Patient called regarding prescription of cephalexin she received for cellulitis in ER on 3/30.  States she has been taking cephalexin continuously since 2014 for knee replacement infection. States she thought ER provider was aware of this. Attempted to make appointment with PCP, however PCP out of office until 4/8.   Instructed patient she may need to see another provider in PCP office or go back to ER.  Verbalizes understanding. Patient was transferred to ER to discuss issue and see if provider was available.            Reason for Disposition   Prescription request for new medicine (not a refill)    Additional Information   Negative: [1] Intentional drug overdose AND [2] suicidal thoughts or ideas   Negative: Drug overdose and triager unable to answer question   Negative: Caller requesting a renewal or refill of a medicine patient is currently taking   Negative: Caller requesting information unrelated to medicine   Negative: Caller requesting information about COVID-19 Vaccine   Negative: Caller requesting information about Emergency Contraception   Negative: Caller requesting information about Combined Birth Control Pills   Negative: Caller requesting information about Progestin Birth Control Pills   Negative: Caller requesting information about Post-Op pain or medicines   Negative: Caller requesting a prescription antibiotic (such as Penicillin) for Strep throat and has a positive culture result   Negative: Caller requesting a prescription anti-viral med (such as Tamiflu) and has influenza (flu) symptoms   Negative: Immunization reaction suspected   Negative: Rash while taking a medicine or within 3 days of stopping it   Negative: [1] Asthma and [2] having symptoms of asthma (cough, wheezing, etc.)   Negative: [1] Symptom of illness (e.g., headache, abdominal pain, earache, vomiting) AND [2] more than mild   Negative: Breastfeeding questions about mother's medicines and diet   Negative: MORE THAN A DOUBLE DOSE of  "a prescription or over-the-counter (OTC) drug   Negative: [1] DOUBLE DOSE (an extra dose or lesser amount) of prescription drug AND [2] any symptoms (e.g., dizziness, nausea, pain, sleepiness)   Negative: [1] DOUBLE DOSE (an extra dose or lesser amount) of over-the-counter (OTC) drug AND [2] any symptoms (e.g., dizziness, nausea, pain, sleepiness)   Negative: Took another person's prescription drug   Negative: [1] DOUBLE DOSE (an extra dose or lesser amount) of prescription drug AND [2] NO symptoms  (Exception: A double dose of antibiotics.)   Negative: Diabetes drug error or overdose (e.g., took wrong type of insulin or took extra dose)   Negative: [1] Prescription not at pharmacy AND [2] was prescribed by PCP recently (Exception: Triager has access to EMR and prescription is recorded there. Go to Home Care and confirm for pharmacy.)   Negative: [1] Pharmacy calling with prescription question AND [2] triager unable to answer question   Negative: [1] Caller has URGENT medicine question about med that PCP or specialist prescribed AND [2] triager unable to answer question   Negative: Medicine patch causing local rash or itching   Negative: [1] Caller has medicine question about med NOT prescribed by PCP AND [2] triager unable to answer question (e.g., compatibility with other med, storage)    Answer Assessment - Initial Assessment Questions  1. NAME of MEDICINE: \"What medicine(s) are you calling about?\"      cephalexin  2. QUESTION: \"What is your question?\" (e.g., double dose of medicine, side effect)      Is already prescribed this antibiotic, has been taking since 2014  3. PRESCRIBER: \"Who prescribed the medicine?\" Reason: if prescribed by specialist, call should be referred to that group.      ER provider   4. SYMPTOMS: \"Do you have any symptoms?\" If Yes, ask: \"What symptoms are you having?\"  \"How bad are the symptoms (e.g., mild, moderate, severe)      Prescribed for cellulitis in ER.    5. PREGNANCY:  \"Is there " "any chance that you are pregnant?\" \"When was your last menstrual period?\"      N/a    Protocols used: Medication Question Call-ADULT-    "

## 2024-04-11 ENCOUNTER — OFFICE VISIT (OUTPATIENT)
Dept: INFECTIOUS DISEASES | Facility: CLINIC | Age: 89
End: 2024-04-11
Payer: MEDICARE

## 2024-04-11 VITALS
HEART RATE: 89 BPM | BODY MASS INDEX: 33.77 KG/M2 | TEMPERATURE: 97.4 F | DIASTOLIC BLOOD PRESSURE: 84 MMHG | HEIGHT: 63 IN | SYSTOLIC BLOOD PRESSURE: 127 MMHG | RESPIRATION RATE: 18 BRPM | WEIGHT: 190.6 LBS

## 2024-04-11 DIAGNOSIS — C91.10 CLL (CHRONIC LYMPHOCYTIC LEUKEMIA): ICD-10-CM

## 2024-04-11 DIAGNOSIS — A49.01 MSSA (METHICILLIN SUSCEPTIBLE STAPHYLOCOCCUS AUREUS) INFECTION: Primary | ICD-10-CM

## 2024-04-11 DIAGNOSIS — I48.91 ON COUMADIN FOR ATRIAL FIBRILLATION: ICD-10-CM

## 2024-04-11 DIAGNOSIS — A49.8 PSEUDOMONAS INFECTION: ICD-10-CM

## 2024-04-11 DIAGNOSIS — Z79.01 ON COUMADIN FOR ATRIAL FIBRILLATION: ICD-10-CM

## 2024-04-11 DIAGNOSIS — E11.9 CONTROLLED TYPE 2 DIABETES MELLITUS WITHOUT COMPLICATION, UNSPECIFIED WHETHER LONG TERM INSULIN USE: ICD-10-CM

## 2024-04-11 DIAGNOSIS — D80.1 HYPOGAMMAGLOBULINEMIA: ICD-10-CM

## 2024-04-11 RX ORDER — LEVOFLOXACIN 500 MG/1
500 TABLET, FILM COATED ORAL DAILY
Qty: 7 TABLET | Refills: 0 | Status: ON HOLD | OUTPATIENT
Start: 2024-04-12 | End: 2024-04-19

## 2024-04-11 RX ORDER — CEPHALEXIN 500 MG/1
500 CAPSULE ORAL 3 TIMES DAILY
Qty: 270 CAPSULE | Refills: 3 | Status: ON HOLD | OUTPATIENT
Start: 2024-04-11 | End: 2024-07-10

## 2024-04-11 NOTE — PROGRESS NOTES
ID CLINIC NOTE    CC: f/u Prosthetic right knee infection - culture negative (history of MSSA)    HPI: Caitlyn Longoria is a 89 y.o. female here for f/u prosthetic right knee infection - culture negative though had a prior history of MSSA. She is on suppressive cephalexin 500 mg PO q8h since she is not a surgical candidate (or at least a poor surgical candidate) per my prior discussions Dr Hunter. She was initially on suppressive cefadroxil 500 mg PO BID but insurance changes caused the price to be too high. She says her knee continues to do well. No recent issues. Tolerating cephalexin w/o any side effects.    The main, new current issue is a wound on the lower extremity.  She came to the [facility] emergency room on 3/30 and they gave her a dose of cefazolin and prescribed cephalexin.  She went to be seen at [facility] on 4/3 and she was prescribed doxycycline.  A wound culture was done and ended up growing Pseudomonas.  Therefore she was ultimately placed on levofloxacin 500 mg daily for 7-day course and she took the last pill today.  Overall, the area is improved in terms of erythema and warmth.  The warmth is resolved.  The erythema still persist.  She has swelling.  She has not been using her compression stockings, leg pumps, or elevating the leg particularly high over the past few weeks.    She continues on IVIg for hypogammaglobulinemia. She also follows with oncology for CLL. She remains on coumadin for Afib.      PMH:  R knee replacement  R knee infection due to MSSA s/p liner exchange in 2014  DM2  Hernia repair  Pacemaker  Afib on coumadin  CLL - stage 0 since 2014 untreated  Hypogammaglobulinemia on IVIG  Pseudomonas pneumonia     Social History:  Retired from [employer]  Lives in [place]     Antbiotic allergies:    1. Sulfa  2. Clarithromycin - headache    Medications:   Current Outpatient Medications:     acetaminophen (TYLENOL) 325 MG tablet, Take 2 tablets by mouth Every 4 (Four) Hours As Needed for  Mild Pain ., Disp:  , Rfl:     acetylcysteine (MUCOMYST) 20 % nebulizer solution, Take 2 mL by nebulization 4 (Four) Times a Day., Disp:  , Rfl:     albuterol (PROVENTIL) (2.5 MG/3ML) 0.083% nebulizer solution, Take 2.5 mg by nebulization Every 4 (Four) Hours., Disp: , Rfl:     albuterol sulfate  (90 Base) MCG/ACT inhaler, Inhale 2 puffs Every 4 (Four) Hours As Needed for Wheezing., Disp: 1 inhaler, Rfl: 3    Benralizumab (FASENRA SC), Inject  under the skin into the appropriate area as directed. Gets one shot a month, next shot may 13 then one every 8 weeks, Disp: , Rfl:     Budesonide (ENTOCORT EC) 3 MG 24 hr capsule, Take 2 capsules by mouth Every Morning., Disp: , Rfl:     Calcium Carbonate-Vitamin D (calcium-vitamin D) 500-200 MG-UNIT tablet per tablet, Take 1 tablet by mouth., Disp: , Rfl:     cephalexin (KEFLEX) 500 MG capsule, Take 1 capsule by mouth 3 (Three) Times a Day., Disp: 21 capsule, Rfl: 0    Cetirizine HCl 10 MG capsule, Take  by mouth., Disp: , Rfl:     fluticasone (FLONASE) 50 MCG/ACT nasal spray, 1 spray into the nostril(s) as directed by provider Daily., Disp: , Rfl:     furosemide (LASIX) 20 MG tablet, Take 1 tablet by mouth As Needed (For weight gain of greater than 2 pounds in 1 day or 5 pounds in 1 week)., Disp: 30 tablet, Rfl: 11    glipizide (GLUCOTROL) 5 MG tablet, Take 1 tablet by mouth. Take one half tablet by mouth daily, Disp: , Rfl:     guaiFENesin (MUCINEX) 600 MG 12 hr tablet, Take 1 tablet by mouth Every 12 (Twelve) Hours., Disp:  , Rfl:     HyperSal 7 % nebulizer solution nebulizer solution, USE 1 VIAL VIA NEBULIZER TWICE DAILY, Disp: , Rfl:     ketoconazole (NIZORAL) 2 % cream, 60, Disp: , Rfl:     Loperamide HCl (IMODIUM PO), Take  by mouth Daily., Disp: , Rfl:     Loperamide-Simethicone (Imodium Multi-Symptom Relief) 2-125 MG tablet, tablet, Disp: , Rfl:     melatonin 5 MG tablet tablet, Take 1 tablet by mouth Every Night., Disp: , Rfl:     metFORMIN ER (GLUCOPHAGE-XR)  "500 MG 24 hr tablet, , Disp: , Rfl:     montelukast (SINGULAIR) 10 MG tablet, Take 1 tablet by mouth Every Night., Disp: , Rfl:     mupirocin (BACTROBAN) 2 % cream, Apply 1 Application topically to the appropriate area as directed 3 (Three) Times a Day., Disp: , Rfl:     nystatin (MYCOSTATIN) 385866 UNIT/GM cream, , Disp: , Rfl:     oxybutynin XL (DITROPAN-XL) 10 MG 24 hr tablet, Take 1 tablet by mouth every night at bedtime., Disp: , Rfl:     polyethyl glycol-propyl glycol (SYSTANE) 0.4-0.3 % solution ophthalmic solution (artificial tears), Administer 1 drop to both eyes Every 1 (One) Hour As Needed., Disp: , Rfl:     revefenacin (Yupelri) 175 MCG/3ML nebulizer solution, Take  by nebulization Daily., Disp: , Rfl:     rosuvastatin (CRESTOR) 20 MG tablet, Take 1 tablet by mouth Every Night., Disp: , Rfl:     warfarin (COUMADIN) 4 MG tablet, TAKE ONE-HALF (1/2) TABLET ON SUNDAY AND FRIDAY AND TAKE ONE TABLET ALL OTHER DAYS OR AS DIRECTED, Disp: 80 tablet, Rfl: 1    OBJECTIVE:  /84 (BP Location: Right arm, Patient Position: Sitting, Cuff Size: Large Adult)   Pulse 89   Temp 97.4 °F (36.3 °C) (Temporal)   Resp 18   Ht 160 cm (63\")   Wt 86.5 kg (190 lb 9.6 oz)   BMI 33.76 kg/m²       General: awake, alert, NAD, very nice  Eyes: no scleral icterus  Respiratory: normal work of breathing on room air  Musculoskeletal: R knee incision is healed; looks great; not warm or tender  Skin: Open wound right lower extremity; no current drainage; there is erythema; there is no warmth  Neurological: Alert and oriented x 3  Psychiatric: Normal mood and affect     DIAGNOSTICS:  CBC, BMP, INR, and A1c reviewed today  Lab Results   Component Value Date    WBC 14.42 (H) 03/30/2024    HGB 12.9 03/30/2024    HCT 41.1 03/30/2024    .5 (H) 03/30/2024     03/30/2024     Lab Results   Component Value Date    GLUCOSE 179 (H) 03/30/2024    CALCIUM 8.2 (L) 03/30/2024     03/30/2024    K 3.5 03/30/2024    CO2 24.0 " 03/30/2024     (H) 03/30/2024    BUN 20 03/30/2024    CREATININE 0.87 03/30/2024    EGFRIFAFRI >60 10/03/2022    EGFRIFNONA 60 (L) 02/08/2022    BCR 23.0 03/30/2024    ANIONGAP 12.0 03/30/2024     Lab Results   Component Value Date    INR 2.28 (H) 03/30/2024    INR 2.10 03/15/2024    INR 2.00 02/16/2024    PROTIME 25.4 (H) 03/30/2024    PROTIME 39.5 (A) 04/27/2022    PROTIME 24.8 (H) 02/08/2022     Lab Results   Component Value Date    HGBA1C 7.4 (H) 01/18/2024       ASSESSMENT/PLAN:  1. Prosthetic right knee infection - culture negative (but history of MSSA)  -not a very good surgical candidate per my prior discussions with Dr Hunter  -completed 6 weeks of IV cefazolin in February 2018 and is now on suppressive antibiotic therapy with cephalexin 500 mg PO q8h for chronic suppressive antibiotic therapy  -RX sent in  -recent labs reviewed; no need for repeat labs today    2. Long term use of antibiotics  -recent labs reviewed    3. Hypogammaglobulinemia  -on IVIg infusions during the winter months through the [facility]    4. CLL, stage 0  - no treatment required at this time    5. Afib on Coumadin    6. Controlled DM2 for age- A1c 7.4%    7. Pseudomonas wound infection  -RXed levofloxacin 500 mg PO daily x 7 more days  -encouraged leg elevation  -if poor wound healing, would recommend wound care referral as an outpatient    RTC 12 months or sooner if needed

## 2024-04-14 ENCOUNTER — APPOINTMENT (OUTPATIENT)
Dept: CT IMAGING | Facility: HOSPITAL | Age: 89
End: 2024-04-14
Payer: MEDICARE

## 2024-04-14 ENCOUNTER — HOSPITAL ENCOUNTER (OUTPATIENT)
Facility: HOSPITAL | Age: 89
Setting detail: OBSERVATION
Discharge: HOME HEALTH | End: 2024-04-16
Attending: EMERGENCY MEDICINE | Admitting: HOSPITALIST
Payer: MEDICARE

## 2024-04-14 ENCOUNTER — APPOINTMENT (OUTPATIENT)
Dept: GENERAL RADIOLOGY | Facility: HOSPITAL | Age: 89
End: 2024-04-14
Payer: MEDICARE

## 2024-04-14 DIAGNOSIS — D80.1 HYPOGAMMAGLOBULINEMIA: ICD-10-CM

## 2024-04-14 DIAGNOSIS — Z79.01 CURRENT USE OF LONG TERM ANTICOAGULATION: ICD-10-CM

## 2024-04-14 DIAGNOSIS — L03.115 CELLULITIS OF RIGHT LOWER EXTREMITY: Primary | ICD-10-CM

## 2024-04-14 DIAGNOSIS — S80.01XA CONTUSION OF RIGHT KNEE, INITIAL ENCOUNTER: ICD-10-CM
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
## 2024-04-14 DIAGNOSIS — I50.42 CHRONIC COMBINED SYSTOLIC AND DIASTOLIC CONGESTIVE HEART FAILURE: ICD-10-CM

## 2024-04-14 DIAGNOSIS — S61.411A: ICD-10-CM

## 2024-04-14 DIAGNOSIS — Z79.01 CHRONIC ANTICOAGULATION: ICD-10-CM

## 2024-04-14 DIAGNOSIS — C91.10 CLL (CHRONIC LYMPHOCYTIC LEUKEMIA): ICD-10-CM

## 2024-04-14 DIAGNOSIS — S81.801A UNSPECIFIED OPEN WOUND, RIGHT LOWER LEG, INITIAL ENCOUNTER: ICD-10-CM

## 2024-04-14 DIAGNOSIS — S00.93XA CONTUSION OF HEAD, UNSPECIFIED PART OF HEAD, INITIAL ENCOUNTER: ICD-10-CM

## 2024-04-14 LAB
ALBUMIN SERPL-MCNC: 3.9 G/DL (ref 3.5–5.2)
ALBUMIN/GLOB SERPL: 1.6 G/DL
ALP SERPL-CCNC: 109 U/L (ref 39–117)
ALT SERPL W P-5'-P-CCNC: 30 U/L (ref 1–33)
ANION GAP SERPL CALCULATED.3IONS-SCNC: 9 MMOL/L (ref 5–15)
AST SERPL-CCNC: 42 U/L (ref 1–32)
BILIRUB SERPL-MCNC: 0.6 MG/DL (ref 0–1.2)
BUN SERPL-MCNC: 23 MG/DL (ref 8–23)
BUN/CREAT SERPL: 20.4 (ref 7–25)
CALCIUM SPEC-SCNC: 8.4 MG/DL (ref 8.6–10.5)
CHLORIDE SERPL-SCNC: 109 MMOL/L (ref 98–107)
CO2 SERPL-SCNC: 27 MMOL/L (ref 22–29)
CREAT SERPL-MCNC: 1.13 MG/DL (ref 0.57–1)
CRP SERPL-MCNC: 0.61 MG/DL (ref 0–0.5)
D-LACTATE SERPL-SCNC: 1.4 MMOL/L (ref 0.5–2)
DEPRECATED RDW RBC AUTO: 51.5 FL (ref 37–54)
EGFRCR SERPLBLD CKD-EPI 2021: 46.6 ML/MIN/1.73
ERYTHROCYTE [DISTWIDTH] IN BLOOD BY AUTOMATED COUNT: 13.5 % (ref 12.3–15.4)
ERYTHROCYTE [SEDIMENTATION RATE] IN BLOOD: 6 MM/HR (ref 0–30)
GLOBULIN UR ELPH-MCNC: 2.4 GM/DL
GLUCOSE SERPL-MCNC: 158 MG/DL (ref 65–99)
HCT VFR BLD AUTO: 40.9 % (ref 34–46.6)
HGB BLD-MCNC: 12.7 G/DL (ref 12–15.9)
INR PPP: 2.33 (ref 0.9–1.1)
LYMPHOCYTES # BLD MANUAL: 7.25 10*3/MM3 (ref 0.7–3.1)
LYMPHOCYTES NFR BLD MANUAL: 1.1 % (ref 5–12)
MCH RBC QN AUTO: 31.5 PG (ref 26.6–33)
MCHC RBC AUTO-ENTMCNC: 31.1 G/DL (ref 31.5–35.7)
MCV RBC AUTO: 101.5 FL (ref 79–97)
MONOCYTES # BLD: 0.16 10*3/MM3 (ref 0.1–0.9)
NEUTROPHILS # BLD AUTO: 6.95 10*3/MM3 (ref 1.7–7)
NEUTROPHILS NFR BLD MANUAL: 48.4 % (ref 42.7–76)
OVALOCYTES BLD QL SMEAR: ABNORMAL
PLAT MORPH BLD: NORMAL
PLATELET # BLD AUTO: 138 10*3/MM3 (ref 140–450)
PMV BLD AUTO: 9.8 FL (ref 6–12)
POIKILOCYTOSIS BLD QL SMEAR: ABNORMAL
POTASSIUM SERPL-SCNC: 3.9 MMOL/L (ref 3.5–5.2)
PROCALCITONIN SERPL-MCNC: 0.05 NG/ML (ref 0–0.25)
PROT SERPL-MCNC: 6.3 G/DL (ref 6–8.5)
PROTHROMBIN TIME: 25.8 SECONDS (ref 11.7–14.2)
RBC # BLD AUTO: 4.03 10*6/MM3 (ref 3.77–5.28)
SMUDGE CELLS BLD QL SMEAR: ABNORMAL
SODIUM SERPL-SCNC: 145 MMOL/L (ref 136–145)
VARIANT LYMPHS NFR BLD MANUAL: 50.5 % (ref 19.6–45.3)
WBC NRBC COR # BLD AUTO: 14.35 10*3/MM3 (ref 3.4–10.8)

## 2024-04-14 PROCEDURE — 72125 CT NECK SPINE W/O DYE: CPT

## 2024-04-14 PROCEDURE — 36415 COLL VENOUS BLD VENIPUNCTURE: CPT

## 2024-04-14 PROCEDURE — 73130 X-RAY EXAM OF HAND: CPT

## 2024-04-14 PROCEDURE — G0378 HOSPITAL OBSERVATION PER HR: HCPCS

## 2024-04-14 PROCEDURE — 83605 ASSAY OF LACTIC ACID: CPT | Performed by: EMERGENCY MEDICINE

## 2024-04-14 PROCEDURE — 85652 RBC SED RATE AUTOMATED: CPT | Performed by: EMERGENCY MEDICINE

## 2024-04-14 PROCEDURE — 85610 PROTHROMBIN TIME: CPT | Performed by: EMERGENCY MEDICINE

## 2024-04-14 PROCEDURE — 73560 X-RAY EXAM OF KNEE 1 OR 2: CPT

## 2024-04-14 PROCEDURE — 73590 X-RAY EXAM OF LOWER LEG: CPT

## 2024-04-14 PROCEDURE — 70450 CT HEAD/BRAIN W/O DYE: CPT

## 2024-04-14 PROCEDURE — 87040 BLOOD CULTURE FOR BACTERIA: CPT | Performed by: EMERGENCY MEDICINE

## 2024-04-14 PROCEDURE — 85007 BL SMEAR W/DIFF WBC COUNT: CPT | Performed by: EMERGENCY MEDICINE

## 2024-04-14 PROCEDURE — 73110 X-RAY EXAM OF WRIST: CPT

## 2024-04-14 PROCEDURE — 99285 EMERGENCY DEPT VISIT HI MDM: CPT

## 2024-04-14 PROCEDURE — 85025 COMPLETE CBC W/AUTO DIFF WBC: CPT | Performed by: EMERGENCY MEDICINE

## 2024-04-14 PROCEDURE — 96365 THER/PROPH/DIAG IV INF INIT: CPT

## 2024-04-14 PROCEDURE — 25810000003 SODIUM CHLORIDE 0.9 % SOLUTION: Performed by: EMERGENCY MEDICINE

## 2024-04-14 PROCEDURE — 25010000002 LEVOFLOXACIN PER 250 MG: Performed by: EMERGENCY MEDICINE

## 2024-04-14 PROCEDURE — 72110 X-RAY EXAM L-2 SPINE 4/>VWS: CPT

## 2024-04-14 PROCEDURE — 80053 COMPREHEN METABOLIC PANEL: CPT | Performed by: EMERGENCY MEDICINE

## 2024-04-14 PROCEDURE — 84145 PROCALCITONIN (PCT): CPT | Performed by: EMERGENCY MEDICINE

## 2024-04-14 PROCEDURE — 86140 C-REACTIVE PROTEIN: CPT | Performed by: EMERGENCY MEDICINE

## 2024-04-14 RX ORDER — LEVOFLOXACIN 5 MG/ML
750 INJECTION, SOLUTION INTRAVENOUS ONCE
Status: COMPLETED | OUTPATIENT
Start: 2024-04-14 | End: 2024-04-15

## 2024-04-14 RX ORDER — LIDOCAINE 4 G/G
1 PATCH TOPICAL
Status: DISCONTINUED | OUTPATIENT
Start: 2024-04-14 | End: 2024-04-16 | Stop reason: HOSPADM

## 2024-04-14 RX ORDER — SODIUM CHLORIDE 0.9 % (FLUSH) 0.9 %
10 SYRINGE (ML) INJECTION AS NEEDED
Status: DISCONTINUED | OUTPATIENT
Start: 2024-04-14 | End: 2024-04-16 | Stop reason: HOSPADM

## 2024-04-14 RX ADMIN — LEVOFLOXACIN 750 MG: 5 INJECTION, SOLUTION INTRAVENOUS at 23:25

## 2024-04-14 RX ADMIN — LIDOCAINE 1 PATCH: 4 PATCH TOPICAL at 22:56

## 2024-04-14 RX ADMIN — SODIUM CHLORIDE 500 ML: 9 INJECTION, SOLUTION INTRAVENOUS at 22:56

## 2024-04-15 PROBLEM — E87.6 HYPOKALEMIA: Status: ACTIVE | Noted: 2024-04-15

## 2024-04-15 PROBLEM — G47.34 NOCTURNAL HYPOXIA: Status: ACTIVE | Noted: 2024-04-15

## 2024-04-15 PROBLEM — J44.9 COPD (CHRONIC OBSTRUCTIVE PULMONARY DISEASE): Status: ACTIVE | Noted: 2024-04-15

## 2024-04-15 LAB
ANION GAP SERPL CALCULATED.3IONS-SCNC: 12.1 MMOL/L (ref 5–15)
BUN SERPL-MCNC: 22 MG/DL (ref 8–23)
BUN/CREAT SERPL: 25.9 (ref 7–25)
CALCIUM SPEC-SCNC: 8 MG/DL (ref 8.6–10.5)
CHLORIDE SERPL-SCNC: 108 MMOL/L (ref 98–107)
CO2 SERPL-SCNC: 20.9 MMOL/L (ref 22–29)
CREAT SERPL-MCNC: 0.85 MG/DL (ref 0.57–1)
DEPRECATED RDW RBC AUTO: 49.5 FL (ref 37–54)
EGFRCR SERPLBLD CKD-EPI 2021: 65.6 ML/MIN/1.73
ERYTHROCYTE [DISTWIDTH] IN BLOOD BY AUTOMATED COUNT: 13.5 % (ref 12.3–15.4)
GLUCOSE BLDC GLUCOMTR-MCNC: 117 MG/DL (ref 70–130)
GLUCOSE BLDC GLUCOMTR-MCNC: 161 MG/DL (ref 70–130)
GLUCOSE BLDC GLUCOMTR-MCNC: 96 MG/DL (ref 70–130)
GLUCOSE BLDC GLUCOMTR-MCNC: 99 MG/DL (ref 70–130)
GLUCOSE SERPL-MCNC: 94 MG/DL (ref 65–99)
HCT VFR BLD AUTO: 37.8 % (ref 34–46.6)
HGB BLD-MCNC: 11.8 G/DL (ref 12–15.9)
INR PPP: 2.58 (ref 0.9–1.1)
LYMPHOCYTES # BLD MANUAL: 6.46 10*3/MM3 (ref 0.7–3.1)
MAGNESIUM SERPL-MCNC: 1.8 MG/DL (ref 1.6–2.4)
MCH RBC QN AUTO: 31 PG (ref 26.6–33)
MCHC RBC AUTO-ENTMCNC: 31.2 G/DL (ref 31.5–35.7)
MCV RBC AUTO: 99.2 FL (ref 79–97)
NEUTROPHILS # BLD AUTO: 5.4 10*3/MM3 (ref 1.7–7)
NEUTROPHILS NFR BLD MANUAL: 45.5 % (ref 42.7–76)
PLAT MORPH BLD: NORMAL
PLATELET # BLD AUTO: 123 10*3/MM3 (ref 140–450)
PMV BLD AUTO: 10.1 FL (ref 6–12)
POIKILOCYTOSIS BLD QL SMEAR: ABNORMAL
POTASSIUM SERPL-SCNC: 3.1 MMOL/L (ref 3.5–5.2)
POTASSIUM SERPL-SCNC: 4.4 MMOL/L (ref 3.5–5.2)
PROTHROMBIN TIME: 27.9 SECONDS (ref 11.7–14.2)
QT INTERVAL: 424 MS
QTC INTERVAL: 490 MS
RBC # BLD AUTO: 3.81 10*6/MM3 (ref 3.77–5.28)
SMUDGE CELLS BLD QL SMEAR: ABNORMAL
SODIUM SERPL-SCNC: 141 MMOL/L (ref 136–145)
VARIANT LYMPHS NFR BLD MANUAL: 54.5 % (ref 19.6–45.3)
WBC NRBC COR # BLD AUTO: 11.86 10*3/MM3 (ref 3.4–10.8)

## 2024-04-15 PROCEDURE — 83735 ASSAY OF MAGNESIUM: CPT | Performed by: HOSPITALIST

## 2024-04-15 PROCEDURE — 97162 PT EVAL MOD COMPLEX 30 MIN: CPT

## 2024-04-15 PROCEDURE — 85610 PROTHROMBIN TIME: CPT | Performed by: NURSE PRACTITIONER

## 2024-04-15 PROCEDURE — 85025 COMPLETE CBC W/AUTO DIFF WBC: CPT | Performed by: NURSE PRACTITIONER

## 2024-04-15 PROCEDURE — 97530 THERAPEUTIC ACTIVITIES: CPT

## 2024-04-15 PROCEDURE — 84132 ASSAY OF SERUM POTASSIUM: CPT | Performed by: HOSPITALIST

## 2024-04-15 PROCEDURE — G0378 HOSPITAL OBSERVATION PER HR: HCPCS

## 2024-04-15 PROCEDURE — 85007 BL SMEAR W/DIFF WBC COUNT: CPT | Performed by: NURSE PRACTITIONER

## 2024-04-15 PROCEDURE — 82948 REAGENT STRIP/BLOOD GLUCOSE: CPT

## 2024-04-15 PROCEDURE — 94640 AIRWAY INHALATION TREATMENT: CPT

## 2024-04-15 PROCEDURE — 94799 UNLISTED PULMONARY SVC/PX: CPT

## 2024-04-15 PROCEDURE — 93010 ELECTROCARDIOGRAM REPORT: CPT | Performed by: INTERNAL MEDICINE

## 2024-04-15 PROCEDURE — 93005 ELECTROCARDIOGRAM TRACING: CPT | Performed by: HOSPITALIST

## 2024-04-15 PROCEDURE — 80048 BASIC METABOLIC PNL TOTAL CA: CPT | Performed by: NURSE PRACTITIONER

## 2024-04-15 PROCEDURE — 94761 N-INVAS EAR/PLS OXIMETRY MLT: CPT

## 2024-04-15 PROCEDURE — 96361 HYDRATE IV INFUSION ADD-ON: CPT

## 2024-04-15 PROCEDURE — 99215 OFFICE O/P EST HI 40 MIN: CPT | Performed by: INTERNAL MEDICINE

## 2024-04-15 PROCEDURE — 63710000001 INSULIN LISPRO (HUMAN) PER 5 UNITS: Performed by: NURSE PRACTITIONER

## 2024-04-15 RX ORDER — ARFORMOTEROL TARTRATE 15 UG/2ML
15 SOLUTION RESPIRATORY (INHALATION)
Status: DISCONTINUED | OUTPATIENT
Start: 2024-04-15 | End: 2024-04-16 | Stop reason: HOSPADM

## 2024-04-15 RX ORDER — POTASSIUM CHLORIDE 750 MG/1
40 TABLET, FILM COATED, EXTENDED RELEASE ORAL EVERY 4 HOURS
Status: COMPLETED | OUTPATIENT
Start: 2024-04-15 | End: 2024-04-15

## 2024-04-15 RX ORDER — CEPHALEXIN 500 MG/1
500 CAPSULE ORAL EVERY 8 HOURS SCHEDULED
Qty: 90 CAPSULE | Refills: 0 | Status: DISCONTINUED | OUTPATIENT
Start: 2024-04-15 | End: 2024-04-16 | Stop reason: HOSPADM

## 2024-04-15 RX ORDER — LEVOFLOXACIN 5 MG/ML
750 INJECTION, SOLUTION INTRAVENOUS
Status: DISCONTINUED | OUTPATIENT
Start: 2024-04-16 | End: 2024-04-15

## 2024-04-15 RX ORDER — FLUTICASONE PROPIONATE 50 MCG
1 SPRAY, SUSPENSION (ML) NASAL DAILY
Status: DISCONTINUED | OUTPATIENT
Start: 2024-04-15 | End: 2024-04-16 | Stop reason: HOSPADM

## 2024-04-15 RX ORDER — INSULIN LISPRO 100 [IU]/ML
3-14 INJECTION, SOLUTION INTRAVENOUS; SUBCUTANEOUS
Status: DISCONTINUED | OUTPATIENT
Start: 2024-04-15 | End: 2024-04-16 | Stop reason: HOSPADM

## 2024-04-15 RX ORDER — NICOTINE POLACRILEX 4 MG
15 LOZENGE BUCCAL
Status: DISCONTINUED | OUTPATIENT
Start: 2024-04-15 | End: 2024-04-16 | Stop reason: HOSPADM

## 2024-04-15 RX ORDER — ROSUVASTATIN CALCIUM 20 MG/1
20 TABLET, COATED ORAL NIGHTLY
Status: DISCONTINUED | OUTPATIENT
Start: 2024-04-15 | End: 2024-04-16 | Stop reason: HOSPADM

## 2024-04-15 RX ORDER — NITROGLYCERIN 0.4 MG/1
0.4 TABLET SUBLINGUAL
Status: DISCONTINUED | OUTPATIENT
Start: 2024-04-15 | End: 2024-04-16 | Stop reason: HOSPADM

## 2024-04-15 RX ORDER — ACETAMINOPHEN 325 MG/1
650 TABLET ORAL EVERY 4 HOURS PRN
Status: DISCONTINUED | OUTPATIENT
Start: 2024-04-15 | End: 2024-04-16 | Stop reason: HOSPADM

## 2024-04-15 RX ORDER — CARVEDILOL 3.12 MG/1
3.12 TABLET ORAL 2 TIMES DAILY WITH MEALS
Status: DISCONTINUED | OUTPATIENT
Start: 2024-04-15 | End: 2024-04-16 | Stop reason: HOSPADM

## 2024-04-15 RX ORDER — CETIRIZINE HYDROCHLORIDE 10 MG/1
10 TABLET ORAL DAILY
Status: DISCONTINUED | OUTPATIENT
Start: 2024-04-15 | End: 2024-04-16 | Stop reason: HOSPADM

## 2024-04-15 RX ORDER — ALBUTEROL SULFATE 2.5 MG/3ML
2.5 SOLUTION RESPIRATORY (INHALATION) EVERY 6 HOURS PRN
Status: DISCONTINUED | OUTPATIENT
Start: 2024-04-15 | End: 2024-04-16 | Stop reason: HOSPADM

## 2024-04-15 RX ORDER — SODIUM CHLORIDE 0.9 % (FLUSH) 0.9 %
10 SYRINGE (ML) INJECTION AS NEEDED
Status: DISCONTINUED | OUTPATIENT
Start: 2024-04-15 | End: 2024-04-16 | Stop reason: HOSPADM

## 2024-04-15 RX ORDER — SODIUM CHLORIDE 9 MG/ML
75 INJECTION, SOLUTION INTRAVENOUS CONTINUOUS
Status: DISCONTINUED | OUTPATIENT
Start: 2024-04-15 | End: 2024-04-15

## 2024-04-15 RX ORDER — AMOXICILLIN 250 MG
2 CAPSULE ORAL 2 TIMES DAILY PRN
Status: DISCONTINUED | OUTPATIENT
Start: 2024-04-15 | End: 2024-04-16 | Stop reason: HOSPADM

## 2024-04-15 RX ORDER — BUDESONIDE 0.5 MG/2ML
0.5 INHALANT ORAL 2 TIMES DAILY
COMMUNITY

## 2024-04-15 RX ORDER — BISACODYL 5 MG/1
5 TABLET, DELAYED RELEASE ORAL DAILY PRN
Status: DISCONTINUED | OUTPATIENT
Start: 2024-04-15 | End: 2024-04-16 | Stop reason: HOSPADM

## 2024-04-15 RX ORDER — IBUPROFEN 600 MG/1
1 TABLET ORAL
Status: DISCONTINUED | OUTPATIENT
Start: 2024-04-15 | End: 2024-04-16 | Stop reason: HOSPADM

## 2024-04-15 RX ORDER — SODIUM CHLORIDE 0.9 % (FLUSH) 0.9 %
10 SYRINGE (ML) INJECTION EVERY 12 HOURS SCHEDULED
Status: DISCONTINUED | OUTPATIENT
Start: 2024-04-15 | End: 2024-04-16 | Stop reason: HOSPADM

## 2024-04-15 RX ORDER — ACETAMINOPHEN 160 MG/5ML
650 SOLUTION ORAL EVERY 4 HOURS PRN
Status: DISCONTINUED | OUTPATIENT
Start: 2024-04-15 | End: 2024-04-16 | Stop reason: HOSPADM

## 2024-04-15 RX ORDER — POLYETHYLENE GLYCOL 3350 17 G/17G
17 POWDER, FOR SOLUTION ORAL DAILY PRN
Status: DISCONTINUED | OUTPATIENT
Start: 2024-04-15 | End: 2024-04-16 | Stop reason: HOSPADM

## 2024-04-15 RX ORDER — BISACODYL 10 MG
10 SUPPOSITORY, RECTAL RECTAL DAILY PRN
Status: DISCONTINUED | OUTPATIENT
Start: 2024-04-15 | End: 2024-04-16 | Stop reason: HOSPADM

## 2024-04-15 RX ORDER — DEXTROSE MONOHYDRATE 25 G/50ML
25 INJECTION, SOLUTION INTRAVENOUS
Status: DISCONTINUED | OUTPATIENT
Start: 2024-04-15 | End: 2024-04-16 | Stop reason: HOSPADM

## 2024-04-15 RX ORDER — MONTELUKAST SODIUM 10 MG/1
10 TABLET ORAL NIGHTLY
Status: DISCONTINUED | OUTPATIENT
Start: 2024-04-15 | End: 2024-04-16 | Stop reason: HOSPADM

## 2024-04-15 RX ORDER — SODIUM CHLORIDE 9 MG/ML
40 INJECTION, SOLUTION INTRAVENOUS AS NEEDED
Status: DISCONTINUED | OUTPATIENT
Start: 2024-04-15 | End: 2024-04-16 | Stop reason: HOSPADM

## 2024-04-15 RX ORDER — LEVOFLOXACIN 500 MG/1
500 TABLET, FILM COATED ORAL EVERY 24 HOURS
Status: DISCONTINUED | OUTPATIENT
Start: 2024-04-15 | End: 2024-04-15

## 2024-04-15 RX ORDER — OXYBUTYNIN CHLORIDE 10 MG/1
10 TABLET, EXTENDED RELEASE ORAL 2 TIMES DAILY
Status: DISCONTINUED | OUTPATIENT
Start: 2024-04-15 | End: 2024-04-16 | Stop reason: HOSPADM

## 2024-04-15 RX ORDER — CYCLOBENZAPRINE HCL 10 MG
10 TABLET ORAL 3 TIMES DAILY PRN
Status: DISCONTINUED | OUTPATIENT
Start: 2024-04-15 | End: 2024-04-16 | Stop reason: HOSPADM

## 2024-04-15 RX ORDER — LEVOFLOXACIN 5 MG/ML
250 INJECTION, SOLUTION INTRAVENOUS EVERY 24 HOURS
Status: DISCONTINUED | OUTPATIENT
Start: 2024-04-15 | End: 2024-04-15

## 2024-04-15 RX ORDER — CARVEDILOL 3.12 MG/1
3.12 TABLET ORAL 2 TIMES DAILY WITH MEALS
COMMUNITY

## 2024-04-15 RX ORDER — BUDESONIDE 0.5 MG/2ML
0.5 INHALANT ORAL
Status: DISCONTINUED | OUTPATIENT
Start: 2024-04-15 | End: 2024-04-16 | Stop reason: HOSPADM

## 2024-04-15 RX ORDER — LEVOFLOXACIN 750 MG/1
750 TABLET, FILM COATED ORAL EVERY OTHER DAY
Status: COMPLETED | OUTPATIENT
Start: 2024-04-15 | End: 2024-04-15

## 2024-04-15 RX ORDER — CHOLECALCIFEROL (VITAMIN D3) 125 MCG
5 CAPSULE ORAL NIGHTLY
Status: DISCONTINUED | OUTPATIENT
Start: 2024-04-15 | End: 2024-04-16 | Stop reason: HOSPADM

## 2024-04-15 RX ORDER — FUROSEMIDE 20 MG/1
20 TABLET ORAL DAILY
Status: DISCONTINUED | OUTPATIENT
Start: 2024-04-15 | End: 2024-04-16 | Stop reason: HOSPADM

## 2024-04-15 RX ORDER — WARFARIN SODIUM 4 MG/1
4 TABLET ORAL
Status: DISCONTINUED | OUTPATIENT
Start: 2024-04-15 | End: 2024-04-15

## 2024-04-15 RX ORDER — ACETAMINOPHEN 650 MG/1
650 SUPPOSITORY RECTAL EVERY 4 HOURS PRN
Status: DISCONTINUED | OUTPATIENT
Start: 2024-04-15 | End: 2024-04-16 | Stop reason: HOSPADM

## 2024-04-15 RX ORDER — WARFARIN SODIUM 2 MG/1
2 TABLET ORAL
Status: DISCONTINUED | OUTPATIENT
Start: 2024-04-15 | End: 2024-04-15

## 2024-04-15 RX ORDER — WARFARIN SODIUM 2 MG/1
2 TABLET ORAL
Status: COMPLETED | OUTPATIENT
Start: 2024-04-15 | End: 2024-04-15

## 2024-04-15 RX ADMIN — CEPHALEXIN 500 MG: 500 CAPSULE ORAL at 22:58

## 2024-04-15 RX ADMIN — INSULIN LISPRO 3 UNITS: 100 INJECTION, SOLUTION INTRAVENOUS; SUBCUTANEOUS at 20:39

## 2024-04-15 RX ADMIN — ACETAMINOPHEN 325MG 650 MG: 325 TABLET ORAL at 23:00

## 2024-04-15 RX ADMIN — CARVEDILOL 3.12 MG: 3.12 TABLET, FILM COATED ORAL at 17:37

## 2024-04-15 RX ADMIN — FLUTICASONE PROPIONATE 1 SPRAY: 50 SPRAY, METERED NASAL at 13:46

## 2024-04-15 RX ADMIN — Medication 5 MG: at 20:39

## 2024-04-15 RX ADMIN — SODIUM CHLORIDE 75 ML/HR: 9 INJECTION, SOLUTION INTRAVENOUS at 01:12

## 2024-04-15 RX ADMIN — ROSUVASTATIN CALCIUM 20 MG: 20 TABLET, FILM COATED ORAL at 20:39

## 2024-04-15 RX ADMIN — CYCLOBENZAPRINE 10 MG: 10 TABLET, FILM COATED ORAL at 23:00

## 2024-04-15 RX ADMIN — POTASSIUM CHLORIDE 40 MEQ: 750 TABLET, EXTENDED RELEASE ORAL at 13:46

## 2024-04-15 RX ADMIN — OXYBUTYNIN CHLORIDE 10 MG: 10 TABLET, EXTENDED RELEASE ORAL at 13:46

## 2024-04-15 RX ADMIN — CYCLOBENZAPRINE 10 MG: 10 TABLET, FILM COATED ORAL at 15:33

## 2024-04-15 RX ADMIN — ALBUTEROL SULFATE 2.5 MG: 2.5 SOLUTION RESPIRATORY (INHALATION) at 13:33

## 2024-04-15 RX ADMIN — CEPHALEXIN 500 MG: 500 CAPSULE ORAL at 15:33

## 2024-04-15 RX ADMIN — OXYBUTYNIN CHLORIDE 10 MG: 10 TABLET, EXTENDED RELEASE ORAL at 20:39

## 2024-04-15 RX ADMIN — CETIRIZINE HYDROCHLORIDE 10 MG: 10 TABLET ORAL at 13:46

## 2024-04-15 RX ADMIN — ACETAMINOPHEN 325MG 650 MG: 325 TABLET ORAL at 05:27

## 2024-04-15 RX ADMIN — FUROSEMIDE 20 MG: 20 TABLET ORAL at 13:46

## 2024-04-15 RX ADMIN — Medication 10 ML: at 08:18

## 2024-04-15 RX ADMIN — POTASSIUM CHLORIDE 40 MEQ: 750 TABLET, EXTENDED RELEASE ORAL at 15:33

## 2024-04-15 RX ADMIN — MONTELUKAST SODIUM 10 MG: 10 TABLET, FILM COATED ORAL at 20:39

## 2024-04-15 RX ADMIN — LIDOCAINE 1 PATCH: 4 PATCH TOPICAL at 22:58

## 2024-04-15 RX ADMIN — Medication 10 ML: at 20:39

## 2024-04-15 RX ADMIN — ARFORMOTEROL TARTRATE 15 MCG: 15 SOLUTION RESPIRATORY (INHALATION) at 19:28

## 2024-04-15 RX ADMIN — LEVOFLOXACIN 750 MG: 750 TABLET, FILM COATED ORAL at 13:46

## 2024-04-15 RX ADMIN — POTASSIUM CHLORIDE 40 MEQ: 750 TABLET, EXTENDED RELEASE ORAL at 08:17

## 2024-04-15 RX ADMIN — WARFARIN 2 MG: 2 TABLET ORAL at 17:37

## 2024-04-15 RX ADMIN — BUDESONIDE 0.5 MG: 0.5 SUSPENSION RESPIRATORY (INHALATION) at 19:28

## 2024-04-15 NOTE — CONSULTS
"Referring Provider: KRISTIN Hernandez    Reason for Consultation: worsening cellulitis/ established patient history of MRSA has been on Levaquin     History of present illness:  Caitlyn Longoria is a 89 y.o. who I am asked to evaluate and give opinion for \"worsening cellulitis/ established patient history of MRSA has been on Levaquin.\" History is obtained from the patient and review of the old medical records which I summarize/synthesize as follows: She follows with me in clinic for a history of prosthetic right knee infection probably due to MSSA for which she takes chronic suppressive cephalexin.  I see her once here a year to re-evaluate this medication.  I actually saw her in clinic about 4 days ago.  She told me at that time she had been dealing with a new wound on the right lower extremity.  She was on levofloxacin as an outpatient due to a wound culture that grew Pseudomonas.  It was actually improving but not yet resolved.  I extended her levofloxacin course for another 7 days to go through 4/18/2024.    She presented to the emergency room yesterday at about 8 PM due to a fall at home.  ER note states that she hit her head but did not lose consciousness.  The patient does take Coumadin for atrial fibrillation.  She tells me that the reason that she fell is because her legs were swollen and she could not put her foot all the way into her slippers and this is what made her trip and fall.  She cannot see the lower extremity wound so was not sure if it was better or worse.    In the emergency room and since admission she has been afebrile.  Labs were notable for a WBC 14, CRP 0.6, and procalcitonin 0.05.  She had imaging of the head and C-spine that did not show any acute processes.  Plain films of the tib/fib and wrist did not show any findings concerning for infection.  She was resumed on levofloxacin and an ID consult has been placed.  Primary team evaluation is pending.    Past Medical History:   Diagnosis " Date    Aortic calcification     mild, on echo 12/17/2017    Aortic regurgitation     Trace    Asthma     Atrial fibrillation     CAD (coronary artery disease)     Carpal tunnel syndrome of left wrist     Chronic combined systolic and diastolic congestive heart failure     CKD (chronic kidney disease) stage 3, GFR 30-59 ml/min     COPD (chronic obstructive pulmonary disease)     Coronary artery disease involving native coronary artery of native heart with angina pectoris     Disc degeneration, lumbar     Diverticulosis     DM type 2 (diabetes mellitus, type 2)     GERD (gastroesophageal reflux disease)     History of aneurysm     right femoral artery s/p LHC    History of blood transfusion     History of fracture     History of heart attack     History of vitamin D deficiency     Hyperlipidemia     Hypertension     Leukemia     Mild mitral regurgitation     Mitral annular calcification     12/8/2017- echo, moderate    Osteopenia     PAF (paroxysmal atrial fibrillation)     Peripheral neuropathy     Skin cancer     Left hand    Sleep apnea     bipap    SSS (sick sinus syndrome)     Stroke (cerebrum)     TIA (transient ischemic attack) 2017    Tricuspid regurgitation     Trace       Past Surgical History:   Procedure Laterality Date    BRONCHOSCOPY Bilateral 10/6/2020    Procedure: BRONCHOSCOPY with BILATERAL LUNG washings;  Surgeon: Juno Coburn MD;  Location: [facility] ENDOSCOPY;  Service: Pulmonary;  Laterality: Bilateral;  PRE: purulent bronchitis  POST: PURULENT BRONCHITIS    BRONCHOSCOPY Bilateral 10/9/2020    Procedure: BRONCHOSCOPY with washing;  Surgeon: Juno Coburn MD;  Location: [facility] ENDOSCOPY;  Service: Pulmonary;  Laterality: Bilateral;  pre/post - mucous plug      CARDIAC CATHETERIZATION      CARDIAC ELECTROPHYSIOLOGY PROCEDURE N/A 2/7/2020    Procedure: PPM generator change - dual  medtronic;  Surgeon: Kiel Field MD;  Location: [facility] CATH INVASIVE LOCATION;  Service: Cardiology;   Laterality: N/A;    CHOLECYSTECTOMY      CORONARY STENT PLACEMENT      ENDOSCOPY N/A 9/14/2022    Procedure: ESOPHAGOGASTRODUODENOSCOPY with 54fr main dilatation;  Surgeon: Enio Cota MD;  Location: [facility] ENDOSCOPY;  Service: Gastroenterology;  Laterality: N/A;  pre - dysphagia  post - s/p dilatation, watermelon stomach    HERNIA REPAIR      hital hernia    HYSTERECTOMY      PACEMAKER IMPLANTATION      REPLACEMENT TOTAL KNEE Bilateral      Social History:  Retired from [employer] company  Lives in [place]     Antbiotic allergies:    1. Sulfa  2. Clarithromycin - headache    Medications:    Current Facility-Administered Medications:     acetaminophen (TYLENOL) tablet 650 mg, 650 mg, Oral, Q4H PRN, 650 mg at 04/15/24 0527 **OR** acetaminophen (TYLENOL) 160 MG/5ML oral solution 650 mg, 650 mg, Oral, Q4H PRN **OR** acetaminophen (TYLENOL) suppository 650 mg, 650 mg, Rectal, Q4H PRN, Penny Alexandre APRN    sennosides-docusate (PERICOLACE) 8.6-50 MG per tablet 2 tablet, 2 tablet, Oral, BID PRN **AND** polyethylene glycol (MIRALAX) packet 17 g, 17 g, Oral, Daily PRN **AND** bisacodyl (DULCOLAX) EC tablet 5 mg, 5 mg, Oral, Daily PRN **AND** bisacodyl (DULCOLAX) suppository 10 mg, 10 mg, Rectal, Daily PRN, Penny Alexandre APRN    dextrose (D50W) (25 g/50 mL) IV injection 25 g, 25 g, Intravenous, Q15 Min PRN, Penny Alexandre APRN    dextrose (GLUTOSE) oral gel 15 g, 15 g, Oral, Q15 Min PRN, Penny Alexandre APRN    glucagon (GLUCAGEN) injection 1 mg, 1 mg, Intramuscular, Q15 Min PRN, Penny Alexandre APRN    insulin lispro (HUMALOG/ADMELOG) injection 3-14 Units, 3-14 Units, Subcutaneous, 4x Daily AC & at Bedtime, Penny Alexandre APRN    [START ON 4/16/2024] levoFLOXacin (LEVAQUIN) 750 mg/150 mL D5W (premix) (LEVAQUIN) 750 mg, 750 mg, Intravenous, Q48H, Penny Alexandre APRN    Lidocaine 4 % 1 patch, 1 patch, Transdermal, Q24H, Bhavik Meeks MD, 1 patch at  04/14/24 2256    nitroglycerin (NITROSTAT) SL tablet 0.4 mg, 0.4 mg, Sublingual, Q5 Min PRN, Penny Alexandre APRN    Pharmacy to dose warfarin, , Does not apply, Continuous PRN, Penny Alexandre APRN    potassium chloride (K-DUR,KLOR-CON) ER tablet 40 mEq, 40 mEq, Oral, Q4H, Jnoah Lanier MD, 40 mEq at 04/15/24 0817    Potassium Replacement - Follow Nurse / BPA Driven Protocol, , Does not apply, PRN, Jonah Lanier MD    [COMPLETED] Insert Peripheral IV, , , Once **AND** sodium chloride 0.9 % flush 10 mL, 10 mL, Intravenous, PRN, Bhavik Meeks MD    sodium chloride 0.9 % flush 10 mL, 10 mL, Intravenous, Q12H, Penny Alexandre APRN, 10 mL at 04/15/24 0818    sodium chloride 0.9 % flush 10 mL, 10 mL, Intravenous, PRN, Penny Alexandre APRN    sodium chloride 0.9 % infusion 40 mL, 40 mL, Intravenous, PRN, Penny Alexandre, KRISTIN    sodium chloride 0.9 % infusion, 75 mL/hr, Intravenous, Continuous, Penny Alexandre APRN, Last Rate: 75 mL/hr at 04/15/24 0818, 75 mL/hr at 04/15/24 0818    warfarin (COUMADIN) tablet 2 mg, 2 mg, Oral, Once, Penny Alexandre APRN      Objective   Vital Signs   Temp:  [97.7 °F (36.5 °C)-98.3 °F (36.8 °C)] 97.7 °F (36.5 °C)  Heart Rate:  [79-87] 80  Resp:  [18-20] 18  BP: ()/(57-84) 107/84    Physical Exam:   General: awake, alert, NAD, very nice  Eyes: no scleral icterus  CV: Normal rate, pitting lower extremity edema  Respiratory: normal work of breathing on room air  Musculoskeletal: R knee incision is healed; not warm or tender  Skin: Right lower extremity wound is improved; there is a scab present; no drainage  Neurological: Alert and oriented x 3  Psychiatric: Normal mood and affect     Labs:     Lab Results   Component Value Date    WBC 11.86 (H) 04/15/2024    HGB 11.8 (L) 04/15/2024    HCT 37.8 04/15/2024    MCV 99.2 (H) 04/15/2024     (L) 04/15/2024     Lab Results   Component Value Date    GLUCOSE 94 04/15/2024     CALCIUM 8.0 (L) 04/15/2024     04/15/2024    K 3.1 (L) 04/15/2024    CO2 20.9 (L) 04/15/2024     (H) 04/15/2024    BUN 22 04/15/2024    CREATININE 0.85 04/15/2024    EGFR 65.6 04/15/2024    BCR 25.9 (H) 04/15/2024    ANIONGAP 12.1 04/15/2024     Lab Results   Component Value Date    ALT 30 04/14/2024     Lab Results   Component Value Date    CRP 0.61 (H) 04/14/2024     Lab Results   Component Value Date    HGBA1C 7.4 (H) 01/18/2024     Lab Results   Component Value Date    INR 2.58 (H) 04/15/2024    INR 2.33 (H) 04/14/2024    INR 2.28 (H) 03/30/2024    PROTIME 27.9 (H) 04/15/2024    PROTIME 25.8 (H) 04/14/2024    PROTIME 25.4 (H) 03/30/2024       Microbiology:  4/14 BCx: pending    Radiology:   CT head no acute process  2.  CT cervical spine: No acute fracture  3.  X-ray right wrist no acute fracture  4.  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  Continue levofloxacin 500 mg daily through 4/18/2024.  I will consult OT lymphedema clinic to get their opinion regarding wraps.  No objection to wrapping from an infectious diseases standpoint.  I think we need to optimize her swelling as this is what led to her tripping and falling and coming into the hospital.    ID will follow.  Will discuss with primary team.  Check CBC and BMP and INR in the a.m. for monitoring.    Addendum: Will resume patient's chronic prophylactic cephalexin 500 mg p.o. every 8 hours.

## 2024-04-15 NOTE — DISCHARGE PLACEMENT REQUEST
"Caitlyn Longoria (89 y.o. Female)       Date of Birth   1934    Social Security Number       Address   [address] [place] [address]    Home Phone   [phone]    MRN   [id number]       Mormonism   Congregational    Marital Status   Single                            Admission Date   4/14/24    Admission Type   Emergency    Admitting Provider   Jonah Lanier MD    Attending Provider   Jonah Lanier MD    Department, Room/Bed   [address], [address]/1       Discharge Date       Discharge Disposition       Discharge Destination                                 Attending Provider: Jonah Lanier MD    Allergies: Accupril [Quinapril Hcl], Ahist [Chlorpheniramine], Clarithromycin, Esomeprazole, Latex, Levalbuterol, Levocetirizine, Lipitor [Atorvastatin], Omeprazole, Pravachol [Pravastatin], Sulindac, Valdecoxib, Chlorcyclizine, Diclofenac Sodium, Diphenhydramine, Lodine [Etodolac], Sulfa Antibiotics    Isolation: None   Infection: None   Code Status: CPR    Ht: 160 cm (63\")   Wt: 88.6 kg (195 lb 4.8 oz)    Admission Cmt: None   Principal Problem: Cellulitis of right lower extremity [L03.115]                   Active Insurance as of 4/14/2024       Primary Coverage       Payor Plan Insurance Group Employer/Plan Group    MEDICARE MEDICARE A & B        Payor Plan Address Payor Plan Phone Number Payor Plan Fax Number Effective Dates    [address] [phone]  10/1/1999 - None Entered    [facility] [id number]         Subscriber Name Subscriber Birth Date Member ID       CAITLYN LONGORIA 1934 [id number]               Secondary Coverage       Payor Plan Insurance Group Employer/Plan Group    [facility] OPTIONS PLAN F       Payor Plan Address Payor Plan Phone Number Payor Plan Fax Number Effective Dates    [facility] [phone]  1/1/2015 - None Entered    [address]       [facility] [address]         Subscriber Name Subscriber Birth Date " Member ID       DELFIN LONGORIA GARRETT 1934 [id number]                     Emergency Contacts        (Rel.) Home Phone Work Phone Mobile Phone    Trae Longoria (HCS) (Brother) [phone] -- [phone]    Zoey Adair (HCS) (Relative) [phone] -- [phone]    Inez Longoria (Relative) [phone] -- [phone]

## 2024-04-15 NOTE — NURSING NOTE
ER admit overnight. RLE cellulitis with open wound. IV abx, ID consult. Was here in ER recently for same issue and was given IV abx. Aox4, VSS.

## 2024-04-15 NOTE — H&P
[facility]   HISTORY AND PHYSICAL    Patient Name: Caitlyn Longoria  : 1934  MRN: [id number]  Primary Care Physician:  Zenaida Suarez MD  Date of admission: 2024    Subjective   Subjective     Chief Complaint: fall    History of Present Illness  88yo woman with DM2, PAF (Coumadin), HLD, CKD3, CAD, CLL, hypogammaglobulinemia, HTN, chronic combined CHF, and h/o right prosthetic knee joint infection on chronic suppressive therapy with Keflex per Dr. Miller (ID), who presented to ER with c/o fall at home with CHI but no LOC. She mentioned to ER MD that her RLE was infected and she did not feel it was getting better--in fact said that the swelling/pain in her RLE is the reason that she lost her balance and fell. She was seen by Dr. Miller for regular f/u just last week and started on PO LQN for infected RLE wound that grew Pseudomonas in cultures.       Review of Systems   Constitutional:  Negative for appetite change, chills, fatigue and fever.   HENT:  Negative for congestion, nosebleeds, rhinorrhea, trouble swallowing and voice change.    Eyes:  Negative for pain and visual disturbance.   Respiratory:  Negative for cough, chest tightness and shortness of breath.    Cardiovascular:  Positive for leg swelling (chronic). Negative for chest pain and palpitations.   Gastrointestinal:  Negative for abdominal pain, diarrhea, nausea and vomiting.   Endocrine: Negative for cold intolerance and heat intolerance.   Genitourinary:  Negative for decreased urine volume, difficulty urinating, dysuria and hematuria.   Musculoskeletal:  Negative for back pain and neck pain.   Skin:  Positive for wound (RLE and right hand). Negative for pallor.   Allergic/Immunologic: Positive for environmental allergies (latex). Negative for food allergies.   Neurological:  Negative for dizziness, tremors, seizures, syncope, speech difficulty, light-headedness and headaches.   Hematological:  Negative for  adenopathy. Bruises/bleeds easily (C/w Coumadin therapy).   Psychiatric/Behavioral:  Negative for behavioral problems, confusion and hallucinations.         Personal History     Past Medical History:   Diagnosis Date    Aortic calcification     mild, on echo 12/17/2017    Aortic regurgitation     Trace    Asthma     Atrial fibrillation     CAD (coronary artery disease)     Carpal tunnel syndrome of left wrist     Chronic combined systolic and diastolic congestive heart failure     CKD (chronic kidney disease) stage 3, GFR 30-59 ml/min     COPD (chronic obstructive pulmonary disease)     Coronary artery disease involving native coronary artery of native heart with angina pectoris     Disc degeneration, lumbar     Diverticulosis     DM type 2 (diabetes mellitus, type 2)     GERD (gastroesophageal reflux disease)     History of aneurysm     right femoral artery s/p LHC    History of blood transfusion     History of fracture     History of heart attack     History of vitamin D deficiency     Hyperlipidemia     Hypertension     Leukemia     Mild mitral regurgitation     Mitral annular calcification     12/8/2017- echo, moderate    Osteopenia     PAF (paroxysmal atrial fibrillation)     Peripheral neuropathy     Skin cancer     Left hand    Sleep apnea     bipap    SSS (sick sinus syndrome)     Stroke (cerebrum)     TIA (transient ischemic attack) 2017    Tricuspid regurgitation     Trace       Past Surgical History:   Procedure Laterality Date    BRONCHOSCOPY Bilateral 10/6/2020    Procedure: BRONCHOSCOPY with BILATERAL LUNG washings;  Surgeon: Juno Coburn MD;  Location: [facility] ENDOSCOPY;  Service: Pulmonary;  Laterality: Bilateral;  PRE: purulent bronchitis  POST: PURULENT BRONCHITIS    BRONCHOSCOPY Bilateral 10/9/2020    Procedure: BRONCHOSCOPY with washing;  Surgeon: Juno Coburn MD;  Location: [facility] ENDOSCOPY;  Service: Pulmonary;  Laterality: Bilateral;  pre/post - mucous plug      CARDIAC CATHETERIZATION       CARDIAC ELECTROPHYSIOLOGY PROCEDURE N/A 2/7/2020    Procedure: PPM generator change - dual  medtronic;  Surgeon: Kiel Field MD;  Location:  [facility];  Service: Cardiology;  Laterality: N/A;    CHOLECYSTECTOMY      CORONARY STENT PLACEMENT      ENDOSCOPY N/A 9/14/2022    Procedure: ESOPHAGOGASTRODUODENOSCOPY with 54fr main dilatation;  Surgeon: Enio Cota MD;  Location:  [facility] ENDOSCOPY;  Service: Gastroenterology;  Laterality: N/A;  pre - dysphagia  post - s/p dilatation, watermelon stomach    HERNIA REPAIR      hital hernia    HYSTERECTOMY      PACEMAKER IMPLANTATION      REPLACEMENT TOTAL KNEE Bilateral        Family History: family history includes Colon cancer in her sister; Colon polyps in her mother; Diabetes in her niece; Heart disease in her brother, father, and mother; Hypertension in her brother, daughter, father, maternal aunt, maternal grandfather, maternal grandmother, mother, paternal grandfather, paternal grandmother, sister, and son; Stroke in her father. Otherwise pertinent FHx was reviewed and not pertinent to current issue.    Social History:  reports that she has never smoked. She has never used smokeless tobacco. She reports that she does not drink alcohol and does not use drugs.    Home Medications:  Benralizumab, Budesonide, Cetirizine HCl, Loperamide HCl, Loperamide-Simethicone, acetaminophen, acetylcysteine, albuterol, albuterol sulfate HFA, calcium-vitamin D, cephalexin, fluticasone, furosemide, glipizide, guaiFENesin, ketoconazole, levoFLOXacin, melatonin, metFORMIN ER, montelukast, mupirocin, nystatin, oxybutynin XL, polyethyl glycol-propyl glycol, revefenacin, rosuvastatin, sodium chloride, and warfarin    Allergies:  Allergies   Allergen Reactions    Accupril [Quinapril Hcl] Swelling, Other (See Comments), GI Intolerance and Delirium     HA, constipation     Ahist [Chlorpheniramine] Nausea Only, Other (See Comments) and Dizziness     Headache,  Blurred vision    Clarithromycin Nausea Only, Other (See Comments) and Mental Status Change     HA, Depression, Flushing    Esomeprazole GI Intolerance    Latex Other (See Comments)     Skin breakdown    Levalbuterol Swelling    Levocetirizine Diarrhea and GI Intolerance    Lipitor [Atorvastatin] Other (See Comments) and Myalgia     Dark urine    Omeprazole Nausea Only and Other (See Comments)     HA    Pravachol [Pravastatin] Nausea Only and GI Intolerance     Bloated, Constipation, HA    Sulindac Other (See Comments) and Myalgia     HA, joint pain, bruising    Valdecoxib Irritability    Chlorcyclizine Unknown - Low Severity    Diclofenac Sodium Unknown - Low Severity    Diphenhydramine Unknown - Low Severity    Lodine [Etodolac] Unknown - Low Severity    Sulfa Antibiotics Unknown - Low Severity       Objective    Objective     Vitals:   Temp:  [97.7 °F (36.5 °C)-98.3 °F (36.8 °C)] 97.7 °F (36.5 °C)  Heart Rate:  [79-87] 80  Resp:  [18-20] 18  BP: ()/(57-84) 107/84  Flow (L/min):  [2] 2    Physical Exam  Vitals and nursing note reviewed.   Constitutional:       General: She is not in acute distress.     Appearance: She is ill-appearing (chronically). She is not toxic-appearing or diaphoretic.   HENT:      Head: Normocephalic.      Nose: Nose normal.      Mouth/Throat:      Mouth: Mucous membranes are moist.      Pharynx: Oropharynx is clear.   Eyes:      General: No scleral icterus.        Right eye: No discharge.         Left eye: No discharge.      Extraocular Movements: Extraocular movements intact.      Conjunctiva/sclera: Conjunctivae normal.   Cardiovascular:      Rate and Rhythm: Normal rate and regular rhythm.      Pulses: Normal pulses.   Pulmonary:      Effort: Pulmonary effort is normal. No respiratory distress.      Breath sounds: Normal breath sounds. No wheezing or rales.   Abdominal:      General: Bowel sounds are normal. There is no distension.      Palpations: Abdomen is soft.       Tenderness: There is no abdominal tenderness.   Musculoskeletal:         General: Swelling (chronic doughy edema of BLEs, R>L) present. Normal range of motion.      Cervical back: Neck supple.   Skin:     General: Skin is warm and dry.      Capillary Refill: Capillary refill takes less than 2 seconds.      Coloration: Skin is not jaundiced.      Comments: Skin tears right hand, covered with dressing  Wound right shin with surrounding erythema, no drainage, no fluctuance, no streaking   Neurological:      General: No focal deficit present.      Mental Status: She is alert and oriented to person, place, and time. Mental status is at baseline.      Cranial Nerves: No cranial nerve deficit.      Coordination: Coordination normal.   Psychiatric:         Mood and Affect: Mood normal.         Behavior: Behavior normal.         Thought Content: Thought content normal.         Result Review    Result Review:  I have personally reviewed the results from the time of this admission to 4/15/2024 10:24 EDT and agree with these findings:  [x]  Laboratory list / accordion  [x]  Microbiology  [x]  Radiology  [x]  EKG/Telemetry   []  Cardiology/Vascular   []  Pathology  [x]  Old records  []  Other:  Most notable findings include: CT head and C-Spine reassuring  XR Right Wrist and Hand neg for fracture  XR Right Tib/Fib and Knee neg for fracture  XR L-spine neg   CMP fine  K+3.1, Mg++ 1.8  LA wnl, PCT wnl  WBC 14.35->11.86  Plt 138->123  ESR 6, CRP 0.61  INR 2.58  Blood cultures NGTD      Assessment & Plan   Assessment / Plan     Brief Patient Summary:  Caitlyn Longoria is a 89 y.o. female who presented to ER after mechanical fall and was found to have RLE wound/cellulitis that was not responding well to outpt therapy.    Active Hospital Problems:  Active Hospital Problems    Diagnosis     **Cellulitis of right lower extremity     Hypokalemia     Nocturnal hypoxia     Hypogammaglobulinemia     CLL (chronic lymphoid leukemia) in  relapse     Morbid obesity     Current use of long term anticoagulation     Infection of prosthetic right knee joint     Chronic combined systolic and diastolic congestive heart failure     HTN (hypertension)     CKD (chronic kidney disease), stage III     DM II (diabetes mellitus, type II), controlled     Paroxysmal atrial fibrillation     HLD (hyperlipidemia)     Coronary artery disease involving native coronary artery of native heart with angina pectoris      Plan:   Fall  Right hand skin tear  PT eval pending  No syncope/cardiac issue  Imaging negative for any fracture  WOCN to see    RLE cellulitis/wound (Pseudomonas)  Chronic BLE edema  Appreciate ID attention to pt  Change to oral LQN  Blood cultures sent  WOCN to see  OT lymphedema to see for wraps (thought is that controlling edema should help with wound healing more than anything else at this point)  No fever, WBC falling  Stop IVFs    Right knee PJI  Not an issue at present    Chronic combined CHF  Stop IVFs  Continue home meds  Appears euvolemic on exam (BLE edema is chronic)    CKD3a  Cr at or better than baseline  Continue to monitor    Hypokalemia  Replace with protocol  Mg++ level fine    PAF  Chronic AC (Coumadin)  HRs fine, not on RC meds at home  Continue Coumadin, Pharm to dose, INR therapeutic    HTN  Not on meds at home  BPs fine here    DM2  OHGs on hold  Covering with SSI  A1c 7.4 in January    Nocturnal hypoxia  Continue supplemental O2 as needed here    Further orders to follow as suggested by evolving hospital course   D/w pt, RN, CCP, and Pharm at morning huddle.    DVT prophylaxis:  Medical and mechanical DVT prophylaxis orders are present.      CODE STATUS:    Code Status (Patient has no pulse and is not breathing): CPR (Attempt to Resuscitate)  Medical Interventions (Patient has pulse or is breathing): Full Support    Admission Status:  I believe this patient meets observation status.    Jonah Lanier MD

## 2024-04-15 NOTE — NURSING NOTE
04/15/24 0952   Wound 04/14/24 2315 Right distal arm Avulsion   Placement Date/Time: 04/14/24 2315   Present on Original Admission: Yes  Side: Right  Orientation: distal  Location: arm  Primary Wound Type: Avulsion   Base clean;moist;pink  (skin tear with skin flap in place.)   Periwound ecchymotic   Drainage Characteristics/Odor serosanguineous   Drainage Amount scant   Care, Wound cleansed with;sterile normal saline   Dressing Care dressing changed  (Vaseline guaze applied, covered with 4x4 and secured with ACE wrap)   Wound 04/14/24 2315 Right posterior hand Avulsion   Placement Date/Time: 04/14/24 2315   Present on Original Admission: Yes  Side: Right  Orientation: posterior  Location: hand  Primary Wound Type: Avulsion   Base clean;moist;pink  (skin tear with partial loss of skin flap)   Periwound ecchymotic   Drainage Characteristics/Odor serosanguineous   Drainage Amount scant   Care, Wound cleansed with;sterile normal saline   Dressing Care dressing changed  (Vaseline guaze applied, covered with 4x4 and secured with ACE wrap)   Wound 09/30/20 0055 Right lower leg Abrasion   Placement Date/Time: 09/30/20 0055   Present on Hospital Admission: Yes  Side: Right  Orientation: lower  Location: leg  Primary Wound Type: Abrasion   Dressing Appearance open to air   Base black eschar;scab;dry  (soft black eschar/ scab intact)   Periwound intact;pink  (minimal surrounding erythema)   Periwound Temperature warm   Drainage Amount none   Dressing Care open to air  (Wound care orders placed for Thera Honey with Opticel ag, cover with ABD pad, and secure with roll guaze and ACE wraps)     CWON note: pt seen for evaluation of traumatic skin injuries after a fall at home. Pt reports the wound on her leg has been ongoing and non-healing. Skin tears on her hand and arm are stable and the skin tear standing orders will be appropriate. The wound on her leg has a thick scab/ eschar covering. We can begin Thera Honey to enhance  debridement and healing of the wound. Wound care orders placed into [facility]. She could benefit from HH to assist with wound care after D/C. Discussed with RN and with pt.

## 2024-04-15 NOTE — ED NOTES
"Nursing report ED to floor  Ciatlyn Longoria  89 y.o.  female    HPI :  HPI (Adult)  Stated Reason for Visit: Pt arrives via EMS to ED from home with c/o a fall in the kitchen.  Pt states she did hit her head when she fell, no LOC, pt does take Warfarin.  Pt also presents wtih several skin tears from fall.  Pt also with edema/cellulitis in BLE.    Chief Complaint  Chief Complaint   Patient presents with    Fall    Leg Swelling       Admitting doctor:   Steve Khan MD    Admitting diagnosis:   The primary encounter diagnosis was Cellulitis of right lower extremity. Diagnoses of Fall, initial encounter, Contusion of head, unspecified part of head, initial encounter, Contusion of right knee, initial encounter, Chronic anticoagulation, CLL (chronic lymphocytic leukemia), Hypogammaglobulinemia, and ISTAP type 1 skin tear of right hand were also pertinent to this visit.    Code status:   Current Code Status       Date Active Code Status Order ID Comments User Context       Prior            Allergies:   Accupril [quinapril hcl], Ahist [chlorpheniramine], Clarithromycin, Esomeprazole, Latex, Levalbuterol, Levocetirizine, Lipitor [atorvastatin], Omeprazole, Pravachol [pravastatin], Sulindac, Valdecoxib, Chlorcyclizine, Diclofenac sodium, Diphenhydramine, Lodine [etodolac], and Sulfa antibiotics    Isolation:   No active isolations    Intake and Output  No intake or output data in the 24 hours ending 04/14/24 2301    Weight:       04/14/24 1959   Weight: 85.3 kg (188 lb)       Most recent vitals:   Vitals:    04/14/24 1959 04/14/24 2153 04/14/24 2203 04/14/24 2231   BP: 135/71 105/60 121/63 91/60   Pulse: 84 80 80 79   Resp: 20  18    Temp: 98.3 °F (36.8 °C)      SpO2: 95% 94% 93% 95%   Weight: 85.3 kg (188 lb)      Height: 160 cm (63\")          Active LDAs/IV Access:   Lines, Drains & Airways       Active LDAs       Name Placement date Placement time Site Days    Peripheral IV 04/14/24 2155 Left Antecubital 04/14/24  " "2155  Antecubital  less than 1                    Labs (abnormal labs have a star):   Labs Reviewed   COMPREHENSIVE METABOLIC PANEL - Abnormal; Notable for the following components:       Result Value    Glucose 158 (*)     Creatinine 1.13 (*)     Chloride 109 (*)     Calcium 8.4 (*)     AST (SGOT) 42 (*)     eGFR 46.6 (*)     All other components within normal limits    Narrative:     GFR Normal >60  Chronic Kidney Disease <60  Kidney Failure <15    The GFR formula is only valid for adults with stable renal function between ages 18 and 70.   PROTIME-INR - Abnormal; Notable for the following components:    Protime 25.8 (*)     INR 2.33 (*)     All other components within normal limits   C-REACTIVE PROTEIN - Abnormal; Notable for the following components:    C-Reactive Protein 0.61 (*)     All other components within normal limits   CBC WITH AUTO DIFFERENTIAL - Abnormal; Notable for the following components:    WBC 14.35 (*)     .5 (*)     MCHC 31.1 (*)     Platelets 138 (*)     All other components within normal limits   LACTIC ACID, PLASMA - Normal   PROCALCITONIN - Normal    Narrative:     As a Marker for Sepsis (Non-Neonates):    1. <0.5 ng/mL represents a low risk of severe sepsis and/or septic shock.  2. >2 ng/mL represents a high risk of severe sepsis and/or septic shock.    As a Marker for Lower Respiratory Tract Infections that require antibiotic therapy:    PCT on Admission    Antibiotic Therapy       6-12 Hrs later    >0.5                Strongly Recommended  >0.25 - <0.5        Recommended   0.1 - 0.25          Discouraged              Remeasure/reassess PCT  <0.1                Strongly Discouraged     Remeasure/reassess PCT    As 28 day mortality risk marker: \"Change in Procalcitonin Result\" (>80% or <=80%) if Day 0 (or Day 1) and Day 4 values are available. Refer to http://www.siXiss-pct-calculator.com    Change in PCT <=80%  A decrease of PCT levels below or equal to 80% defines a positive " change in PCT test result representing a higher risk for 28-day all-cause mortality of patients diagnosed with severe sepsis for septic shock.    Change in PCT >80%  A decrease of PCT levels of more than 80% defines a negative change in PCT result representing a lower risk for 28-day all-cause mortality of patients diagnosed with severe sepsis or septic shock.      SEDIMENTATION RATE - Normal   BLOOD CULTURE   BLOOD CULTURE   MANUAL DIFFERENTIAL   CBC AND DIFFERENTIAL    Narrative:     The following orders were created for panel order CBC & Differential.  Procedure                               Abnormality         Status                     ---------                               -----------         ------                     CBC Auto Differential[[id number]]        Abnormal            Final result                 Please view results for these tests on the individual orders.       EKG:   No orders to display       Meds given in ED:   Medications   sodium chloride 0.9 % flush 10 mL (has no administration in time range)   levoFLOXacin (LEVAQUIN) 750 mg/150 mL D5W (premix) (LEVAQUIN) 750 mg (has no administration in time range)   Lidocaine 4 % 1 patch (1 patch Transdermal Medication Applied 4/14/24 2256)   sodium chloride 0.9 % bolus 500 mL (500 mL Intravenous New Bag 4/14/24 2256)       Imaging results:  CT Cervical Spine Without Contrast    Result Date: 4/14/2024  No acute fracture or subluxation identified.  Radiation dose reduction techniques were utilized, including automated exposure control and exposure modulation based on body size.   This report was finalized on 4/14/2024 10:16 PM by Dr. Heidi Garza M.D on Workstation: [id number]      CT Head Without Contrast    Result Date: 4/14/2024  No acute intracranial findings.  Radiation dose reduction techniques were utilized, including automated exposure control and exposure modulation based on body size.   This report was finalized on 4/14/2024 10:09 PM by   Heidi Garza M.D on Workstation: [id number]      XR Tibia Fibula 2 View Right    Result Date: 4/14/2024  No acute fracture or subluxation identified.  This report was finalized on 4/14/2024 9:58 PM by Dr. Heidi Garza M.D on Workstation: [id number]      XR Knee 1 or 2 View Right    Result Date: 4/14/2024  No acute fracture or subluxation identified.  This report was finalized on 4/14/2024 9:58 PM by Dr. Heidi Garza M.D on Workstation: [email or web address]      XR Hand 3+ View Right    Result Date: 4/14/2024  No acute fracture or subluxation identified.  This report was finalized on 4/14/2024 9:53 PM by Dr. Heidi Garza M.D on Workstation: [id number]      XR Wrist 3+ View Right    Result Date: 4/14/2024  No acute fracture or subluxation identified.  This report was finalized on 4/14/2024 9:53 PM by Dr. Heidi Garza M.D on Workstation: [email or web address]      XR Spine Lumbar Complete 4+VW    Result Date: 4/14/2024  No acute fracture or subluxation identified.  This report was finalized on 4/14/2024 9:39 PM by Dr. Heidi Garza M.D on Workstation: [email or web address]       Ambulatory status:   - bedrest/ad raiza    Social issues:   Social History     Socioeconomic History    Marital status: Single   Tobacco Use    Smoking status: Never    Smokeless tobacco: Never    Tobacco comments:     caffeine use- soda   Vaping Use    Vaping status: Never Used   Substance and Sexual Activity    Alcohol use: Never    Drug use: No    Sexual activity: Defer       Peripheral Neurovascular  Peripheral Neurovascular (Adult)  Peripheral Neurovascular WDL: .WDL except  Additional Documentation: Edema (Group)  Edema  Edema: knee, right, knee, left, ankle, left, ankle, right, foot, left, foot, right  Knee, Left Edema: 3+ (Moderate)  Knee, Right Edema: 3+ (Moderate)  Ankle, Left Edema: 3+ (Moderate)  Ankle, Right Edema: 3+ (Moderate)  Foot, Left Edema: 3+ (Moderate)  Foot, Right Edema: 3+ (Moderate)    Neuro Cognitive  Neuro  Cognitive (Adult)  Cognitive/Neuro/Behavioral WDL: WDL    Learning  Learning Assessment (Adult)  Learning Readiness and Ability: sensory deficit noted    Respiratory  Respiratory WDL  Respiratory WDL: WDL    Abdominal Pain  Safety Interventions  Safety Precautions/Falls Reduction: assistive device/personal items within reach, fall reduction program maintained  All Alarms: none present    Pain Assessments  Pain (Adult)  (0-10) Pain Rating: Rest: 5  (0-10) Pain Rating: Activity: 5  Pain Location: back  Pain Side/Orientation: medial    NIH Stroke Scale       Raine Tse RN  04/14/24 23:01 EDT

## 2024-04-15 NOTE — CONSULTS
Referring Provider: Dr. Lanier  Reason for Consultation: CHARLENE    Patient Care Team:  Zenaida Suarez MD as PCP - General (Internal Medicine)  Pao Levi RPH as Pharmacist  Alexander Valiente PharmD as Pharmacist (Pharmacy)  Zenaida Suarez MD as Referring Physician (Internal Medicine)  Lucien Larkin MD as Consulting Physician (Hematology and Oncology)    Chief complaint:   Fall    History of present illness:    Subjective   This is an 89-year-old female patient with a history of PAF and combined systolic and diastolic CHF who presented to the hospital today for a fall and was found to have RLE cellulitis.    We are consulted for sleep apnea.  Patient follows with Dr. Coburn at [facility] clinic for COPD/asthma and sleep apnea.      Concerning COPD/asthma, she uses Pulmicort and Brovana.  She was on Trelegy but was switched recently to the nebulizer therapy in order to improve delivery.  She has dyspnea on exertion but denies chronic cough.  She attributed her symptoms of dyspnea to the COPD and partially paralyzed left hemidiaphragm.    Concerning sleep apnea, patient uses BiPAP at home.  Unfortunately she does not have her machine with her here.  Last night, she had episodes of desaturation prompting this consultation and she was placed on 2 L/minute and she slept well with that.    Review of Systems  Constitutional: No fever or chills.   ENMT: No sinus congestion  Cardiovascular: No chest pain, palpitation but legs swelling.    Respiratory: See above.  Gastrointestinal: No constipation, diarrhea or abdominal pain   Neurology: No headache, weakness, numbness or dizziness.   Musculoskeletal: No joints pain, stiffness or swelling.   Psychiatry: No depression.  Genitourinary: No dysuria or frequent urination  Endo: No weight changes. No cold or warm intolerance.  Lymphatic: No swollen glands.  Integumentary: No rash.    History  Past Medical History:   Diagnosis Date    Aortic  calcification     mild, on echo 12/17/2017    Aortic regurgitation     Trace    Asthma     Atrial fibrillation     CAD (coronary artery disease)     Carpal tunnel syndrome of left wrist     Chronic combined systolic and diastolic congestive heart failure     CKD (chronic kidney disease) stage 3, GFR 30-59 ml/min     COPD (chronic obstructive pulmonary disease)     Coronary artery disease involving native coronary artery of native heart with angina pectoris     Disc degeneration, lumbar     Diverticulosis     DM type 2 (diabetes mellitus, type 2)     GERD (gastroesophageal reflux disease)     History of aneurysm     right femoral artery s/p LHC    History of blood transfusion     History of fracture     History of heart attack     History of vitamin D deficiency     Hyperlipidemia     Hypertension     Leukemia     Mild mitral regurgitation     Mitral annular calcification     12/8/2017- echo, moderate    Osteopenia     PAF (paroxysmal atrial fibrillation)     Peripheral neuropathy     Skin cancer     Left hand    Sleep apnea     bipap    SSS (sick sinus syndrome)     Stroke (cerebrum)     TIA (transient ischemic attack) 2017    Tricuspid regurgitation     Trace   ,   Past Surgical History:   Procedure Laterality Date    BRONCHOSCOPY Bilateral 10/6/2020    Procedure: BRONCHOSCOPY with BILATERAL LUNG washings;  Surgeon: Juno Coburn MD;  Location: [facility] ENDOSCOPY;  Service: Pulmonary;  Laterality: Bilateral;  PRE: purulent bronchitis  POST: PURULENT BRONCHITIS    BRONCHOSCOPY Bilateral 10/9/2020    Procedure: BRONCHOSCOPY with washing;  Surgeon: Juno Coburn MD;  Location: [facility] ENDOSCOPY;  Service: Pulmonary;  Laterality: Bilateral;  pre/post - mucous plug      CARDIAC CATHETERIZATION      CARDIAC ELECTROPHYSIOLOGY PROCEDURE N/A 2/7/2020    Procedure: PPM generator change - dual  medtronic;  Surgeon: Kiel Field MD;  Location: [facility] CATH INVASIVE LOCATION;  Service: Cardiology;  Laterality: N/A;     CHOLECYSTECTOMY      CORONARY STENT PLACEMENT      ENDOSCOPY N/A 9/14/2022    Procedure: ESOPHAGOGASTRODUODENOSCOPY with 54fr main dilatation;  Surgeon: Enio Cota MD;  Location: [facility] ENDOSCOPY;  Service: Gastroenterology;  Laterality: N/A;  pre - dysphagia  post - s/p dilatation, watermelon stomach    HERNIA REPAIR      hital hernia    HYSTERECTOMY      PACEMAKER IMPLANTATION      REPLACEMENT TOTAL KNEE Bilateral    ,   Family History   Problem Relation Age of Onset    Heart disease Mother     Hypertension Mother     Colon polyps Mother     Heart disease Father     Hypertension Father     Stroke Father     Hypertension Brother     Heart disease Brother     Diabetes Niece     Colon cancer Sister     Hypertension Sister     Hypertension Daughter     Hypertension Son     Hypertension Maternal Aunt     Hypertension Maternal Grandmother     Hypertension Maternal Grandfather     Hypertension Paternal Grandmother     Hypertension Paternal Grandfather    ,   Social History     Socioeconomic History    Marital status: Single   Tobacco Use    Smoking status: Never    Smokeless tobacco: Never    Tobacco comments:     caffeine use- soda   Vaping Use    Vaping status: Never Used   Substance and Sexual Activity    Alcohol use: Never    Drug use: No    Sexual activity: Defer     E-cigarette/Vaping    E-cigarette/Vaping Use Never User     Passive Exposure No     Counseling Given No      E-cigarette/Vaping Substances    Nicotine No     THC No     CBD No     Flavoring No      E-cigarette/Vaping Devices    Disposable No     Pre-filled or Refillable Cartridge No     Refillable Tank No     Pre-filled Pod No          ,   Medications Prior to Admission   Medication Sig Dispense Refill Last Dose    acetaminophen (TYLENOL) 325 MG tablet Take 2 tablets by mouth Every 4 (Four) Hours As Needed for Mild Pain .       albuterol (PROVENTIL) (2.5 MG/3ML) 0.083% nebulizer solution Take 2.5 mg by nebulization Every 4 (Four) Hours.        albuterol sulfate  (90 Base) MCG/ACT inhaler Inhale 2 puffs Every 4 (Four) Hours As Needed for Wheezing. 1 inhaler 3     Benralizumab (FASENRA SC) Inject  under the skin into the appropriate area as directed. Gets one shot a month, next shot may 13 then one every 8 weeks       Budesonide (ENTOCORT EC) 3 MG 24 hr capsule Take 2 capsules by mouth Every Morning.       budesonide (PULMICORT) 0.5 MG/2ML nebulizer solution Take 2 mL by nebulization 2 (Two) Times a Day.       Calcium Carbonate-Vitamin D (calcium-vitamin D) 500-200 MG-UNIT tablet per tablet Take 1 tablet by mouth.       cephalexin (KEFLEX) 500 MG capsule Take 1 capsule by mouth 3 (Three) Times a Day for 90 days. 270 capsule 3     Cetirizine HCl 10 MG capsule Take  by mouth.       fluticasone (FLONASE) 50 MCG/ACT nasal spray 1 spray into the nostril(s) as directed by provider Daily.       furosemide (LASIX) 20 MG tablet Take 1 tablet by mouth As Needed (For weight gain of greater than 2 pounds in 1 day or 5 pounds in 1 week). 30 tablet 11     glipizide (GLUCOTROL) 5 MG tablet Take 1 tablet by mouth. Take one half tablet by mouth daily       ketoconazole (NIZORAL) 2 % cream 60       levoFLOXacin (LEVAQUIN) 500 MG tablet Take 1 tablet by mouth Daily for 7 days. 7 tablet 0     Loperamide-Simethicone (Imodium Multi-Symptom Relief) 2-125 MG tablet tablet       melatonin 5 MG tablet tablet Take 1 tablet by mouth Every Night.       metFORMIN ER (GLUCOPHAGE-XR) 500 MG 24 hr tablet        montelukast (SINGULAIR) 10 MG tablet Take 1 tablet by mouth Every Night.       nystatin (MYCOSTATIN) 105285 UNIT/GM cream        oxybutynin XL (DITROPAN-XL) 10 MG 24 hr tablet Take 1 tablet by mouth 2 (Two) Times a Day.       polyethyl glycol-propyl glycol (SYSTANE) 0.4-0.3 % solution ophthalmic solution (artificial tears) Administer 1 drop to both eyes Every 1 (One) Hour As Needed.       revefenacin (Yupelri) 175 MCG/3ML nebulizer solution Take  by nebulization Daily.        rosuvastatin (CRESTOR) 20 MG tablet Take 1 tablet by mouth Every Night.       warfarin (COUMADIN) 4 MG tablet TAKE ONE-HALF (1/2) TABLET ON SUNDAY AND FRIDAY AND TAKE ONE TABLET ALL OTHER DAYS OR AS DIRECTED 80 tablet 1     carvedilol (COREG) 3.125 MG tablet Take 1 tablet by mouth 2 (Two) Times a Day With Meals.       Loperamide HCl (IMODIUM PO) Take  by mouth Daily.       mupirocin (BACTROBAN) 2 % cream Apply 1 Application topically to the appropriate area as directed 3 (Three) Times a Day.      , Scheduled Meds:  budesonide, 0.5 mg, Nebulization, BID - RT  carvedilol, 3.125 mg, Oral, BID With Meals  cephalexin, 500 mg, Oral, Q8H  cetirizine, 10 mg, Oral, Daily  fluticasone, 1 spray, Nasal, Daily  furosemide, 20 mg, Oral, Daily  insulin lispro, 3-14 Units, Subcutaneous, 4x Daily AC & at Bedtime  Lidocaine, 1 patch, Transdermal, Q24H  melatonin, 5 mg, Oral, Nightly  montelukast, 10 mg, Oral, Nightly  oxybutynin XL, 10 mg, Oral, BID  rosuvastatin, 20 mg, Oral, Nightly  sodium chloride, 10 mL, Intravenous, Q12H  warfarin, 2 mg, Oral, Once   , Continuous Infusions:  Pharmacy to dose warfarin,    , PRN Meds:    acetaminophen **OR** acetaminophen **OR** acetaminophen    albuterol    senna-docusate sodium **AND** polyethylene glycol **AND** bisacodyl **AND** bisacodyl    cyclobenzaprine    dextrose    dextrose    glucagon (human recombinant)    nitroglycerin    Pharmacy to dose warfarin    Potassium Replacement - Follow Nurse / BPA Driven Protocol    [COMPLETED] Insert Peripheral IV **AND** sodium chloride    sodium chloride    sodium chloride, and Allergies:  Accupril [quinapril hcl], Ahist [chlorpheniramine], Clarithromycin, Esomeprazole, Latex, Levalbuterol, Levocetirizine, Lipitor [atorvastatin], Omeprazole, Pravachol [pravastatin], Sulindac, Valdecoxib, Chlorcyclizine, Diclofenac sodium, Diphenhydramine, Lodine [etodolac], and Sulfa antibiotics    Objective     Vital Signs   Temp:  [97.7 °F (36.5 °C)-98.3 °F (36.8  °C)] 97.9 °F (36.6 °C)  Heart Rate:  [79-87] 80  Resp:  [18-20] 18  BP: ()/(57-84) 119/64    PPE used per hospital policy    Physical Exam:  Constitutional: Not in acute distress.  Eyes: Injected conjunctivae, EOMI. pupils equal reactive to light.  ENMT: Khoury 3. No oral thrush.  Moist tongue.  Neck: Large. Trachea midline. No thyromegaly  Heart: RRR, no murmur  Lungs: Equal but diminished air entry anteriorly.  No audible crackles or wheezing.  Abdomen: Obese. Soft. No tenderness or dullness. No HSM.  Extremities: No cyanosis, clubbing but pitting edema in legs specially right leg with erythema and swelling and presence of moderate size scab on the anterior surface of the right leg..  Warm extremities and well-perfused.  Neuro: Conscious, alert, oriented x3.  Strength 5/5 in arms.  Psych: Appropriate mood and affect.    Integumentary: No rash.  Normal skin turgor  Lymphatic: No palpable cervical or supraclavicular lymph nodes.      Diagnostic imaging:  I personally and independently reviewed the following images:   Lung portion of CT cervical spine 4/14/2024: Clear    Laboratory workup:    Results from last 7 days   Lab Units 04/15/24  0504 04/14/24 2155   SODIUM mmol/L 141 145   POTASSIUM mmol/L 3.1* 3.9   CHLORIDE mmol/L 108* 109*   CO2 mmol/L 20.9* 27.0   BUN mg/dL 22 23   CREATININE mg/dL 0.85 1.13*   GLUCOSE mg/dL 94 158*   CALCIUM mg/dL 8.0* 8.4*         Results from last 7 days   Lab Units 04/15/24  0504 04/14/24 2155   WBC 10*3/mm3 11.86* 14.35*   HEMOGLOBIN g/dL 11.8* 12.7   HEMATOCRIT % 37.8 40.9   PLATELETS 10*3/mm3 123* 138*     Results from last 7 days   Lab Units 04/15/24  0504 04/14/24 2155   INR  2.58* 2.33*           Assessment     CHARLENE  COPD/asthma, unknown severity.  No current exacerbation.  RLE cellulitis        Recommendations:      O2 at night for sleep apnea.  Offered placing her on hospital NIPPV/BiPAP but she felt like she did well with the oxygen alone yesterday and she may not  tolerate the hospital NIPPV/BiPAP.  If she has issues we could try her on empiric BiPAP settings overnight but otherwise we will keep her on oxygen only for now.  DuoNeb 4 times a day as needed for COPD  Initiate Brovana and Pulmicort twice daily      Shane Sears MD  04/15/24  15:48 EDT

## 2024-04-15 NOTE — DISCHARGE PLACEMENT REQUEST
"Caitlyn Longoria (89 y.o. Female)       Date of Birth   1934    Social Security Number       Address   [address] [place] [address]    Home Phone   [phone]    MRN   [id number]       [employer]    Marital Status   Single                            Admission Date   4/14/24    Admission Type   Emergency    Admitting Provider   Jonah Lanier MD    Attending Provider   Jonah Lanier MD    Department, Room/Bed   [address], [address]/1       Discharge Date       Discharge Disposition       Discharge Destination                                 Attending Provider: Jonah Lanier MD    Allergies: Accupril [Quinapril Hcl], Ahist [Chlorpheniramine], Clarithromycin, Esomeprazole, Latex, Levalbuterol, Levocetirizine, Lipitor [Atorvastatin], Omeprazole, Pravachol [Pravastatin], Sulindac, Valdecoxib, Chlorcyclizine, Diclofenac Sodium, Diphenhydramine, Lodine [Etodolac], Sulfa Antibiotics    Isolation: None   Infection: None   Code Status: CPR    Ht: 160 cm (63\")   Wt: 88.6 kg (195 lb 4.8 oz)    Admission Cmt: None   Principal Problem: Cellulitis of right lower extremity [L03.115]                   Active Insurance as of 4/14/2024       Primary Coverage       Payor Plan Insurance Group Employer/Plan Group    MEDICARE MEDICARE A & B        Payor Plan Address Payor Plan Phone Number Payor Plan Fax Number Effective Dates    [address] [phone]  10/1/1999 - None Entered    formerly [facility] [id number]         Subscriber Name Subscriber Birth Date Member ID       CAITLYN LONGORIA 1934 [id number]               Secondary Coverage       Payor Plan Insurance Group Employer/Plan Group    [facility] OPTIONS PLAN F       Payor Plan Address Payor Plan Phone Number Payor Plan Fax Number Effective Dates    [facility] [phone]  1/1/2015 - None Entered    [address]       [facility] [id number]         Subscriber Name Subscriber Birth Date " Member ID       DELFIN LONGORIA GARRETT 1934 [id number]                     Emergency Contacts        (Rel.) Home Phone Work Phone Mobile Phone    Trae Longoria (HCS) (Brother) [phone] -- [phone]    Zoey Adair (HCS) (Relative) [phone] -- [phone]    Inez Longoria (Relative) [phone] -- [phone]

## 2024-04-15 NOTE — PROGRESS NOTES
[facility] Clinical Pharmacy Services: Warfarin Dosing/Monitoring Consult    Caitlyn Longoria is a 89 y.o. female, estimated creatinine clearance is 35 mL/min (A) (by C-G formula based on SCr of 1.13 mg/dL (H)). weighing 85.3 kg (188 lb).    Results from last 7 days   Lab Units 04/14/24  2155   INR  2.33*   HEMOGLOBIN g/dL 12.7   HEMATOCRIT % 40.9   PLATELETS 10*3/mm3 138*     Prior to admission anticoagulation: per [facility] pt takes 2mg every Fri/Sun, 4mg AOD    [facility] Anticoagulation:  Consulting provider: KRISTIN Del Cid  Start date: 4/15  Indication: A Fib - requiring full anticoagulation  Target INR: 2 - 3 (consult says 2.5-3.5 but indication is afib and [facility] clinic note says goal 2-3)  Expected duration: indefinite   Bridge Therapy: No      Potential food or drug interactions:   Levofloxacin- Increase INR    Education complete?/Date: Yes; follows with [facility]    Assessment/Plan:  Dose: Plan for  2mg x 1 dose to start this afternoon due to potential for increased INR with concomitant levofloxacin interaction unless follow up INR with AM labs is significantly lower, then would resume home dose. Daily INRs ordered.   Monitor for any signs or symptoms of bleeding  Follow up daily INRs and dose adjustments    Pharmacy will continue to follow until discharge or discontinuation of warfarin.     Moira Newman, PharmD  Clinical Pharmacist

## 2024-04-15 NOTE — ED PROVIDER NOTES
EMERGENCY DEPARTMENT ENCOUNTER    Room Number:  37/37  Date seen:  4/14/2024  PCP: Zenaida Suarez MD  Historian: Patient and EMS      HPI:  Chief Complaint: Fall  Context: Caitlyn Longoria is a 89 y.o. female who presents to the ED via EMS from home after a fall in the kitchen.  The patient states that she did hit her head but did not pass out.  The patient is on warfarin.  The patient states that she lost her balance due to the swelling in her right leg that she has from her cellulitis.  She states she fell to the right and struck her head, right wrist/hand, low back and right knee.  The patient states that she was recently admitted for cellulitis and is on antibiotics and has seen 5 doctors over the past 2 weeks for this.  She states the swelling has worsened over the last few days.  The patient states she is chronically on warfarin.  She denies fevers or chills.      PAST MEDICAL HISTORY  Active Ambulatory Problems     Diagnosis Date Noted    Neck and shoulder pain 03/24/2017    Arthropathy of shoulder region 03/24/2017    Carpal tunnel syndrome of left wrist 03/24/2017    Chronic pain of both shoulders 06/27/2017    Chronic left shoulder pain 12/05/2017    Arthropathy of left shoulder 12/05/2017    Dysarthria 12/07/2017    DM II (diabetes mellitus, type II), controlled 12/07/2017    Paroxysmal atrial fibrillation 12/07/2017    HLD (hyperlipidemia) 12/07/2017    Chronic bronchitis 12/07/2017    Coronary artery disease involving native coronary artery of native heart with angina pectoris 12/07/2017    CVA (cerebral vascular accident) 12/10/2017    HTN (hypertension) 01/14/2018    CKD (chronic kidney disease), stage III 01/14/2018    Chronic combined systolic and diastolic congestive heart failure 01/15/2018    Infection of prosthetic right knee joint 01/17/2018    Stasis dermatitis of right lower extremity due to peripheral venous hypertension 04/28/2018    Current use of long term anticoagulation  04/28/2018    Morbid obesity 02/08/2019    Acute respiratory failure with hypoxia 09/16/2019    Rhinovirus 02/11/2020    Asthma with acute exacerbation 02/11/2020    Acute respiratory failure with hypoxemia 02/12/2020    Viral pneumonia 04/23/2020    Hypoxia 08/29/2020    CLL (chronic lymphoid leukemia) in relapse 08/31/2020    Dyspnea 09/29/2020    Anticoagulated on Coumadin     Osteoporotic compression fracture of spine 09/30/2020    Pneumonia due to gram-negative bacteria 10/02/2020    Pneumonia due to infectious organism 09/29/2020    Bilateral carotid artery disease 10/28/2020    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     Resolved Ambulatory Problems     Diagnosis Date Noted    Hypernatremia 01/15/2018    Shortness of breath 04/28/2020     Past Medical History:   Diagnosis Date    Aortic calcification     Aortic regurgitation     Asthma     Atrial fibrillation     CAD (coronary artery disease)     CKD (chronic kidney disease) stage 3, GFR 30-59 ml/min     COPD (chronic obstructive pulmonary disease)     Disc degeneration, lumbar     Diverticulosis     DM type 2 (diabetes mellitus, type 2)     GERD (gastroesophageal reflux disease)     History of aneurysm     History of blood transfusion     History of fracture     History of heart attack     History of vitamin D deficiency     Hyperlipidemia     Hypertension     Leukemia     Mild mitral regurgitation     Mitral annular calcification     Osteopenia     PAF (paroxysmal atrial fibrillation)     Peripheral neuropathy     Skin cancer     Sleep apnea     SSS (sick sinus syndrome)     Stroke (cerebrum)     TIA (transient ischemic attack) 2017    Tricuspid regurgitation          REVIEW OF SYSTEMS  All systems reviewed and negative except for those discussed in HPI.       PAST SURGICAL HISTORY  Past Surgical History:   Procedure Laterality Date    BRONCHOSCOPY Bilateral 10/6/2020    Procedure: BRONCHOSCOPY with BILATERAL LUNG washings;   Surgeon: Juno Coburn MD;  Location: [facility] ENDOSCOPY;  Service: Pulmonary;  Laterality: Bilateral;  PRE: purulent bronchitis  POST: PURULENT BRONCHITIS    BRONCHOSCOPY Bilateral 10/9/2020    Procedure: BRONCHOSCOPY with washing;  Surgeon: Juno Coburn MD;  Location: [facility] ENDOSCOPY;  Service: Pulmonary;  Laterality: Bilateral;  pre/post - mucous plug      CARDIAC CATHETERIZATION      CARDIAC ELECTROPHYSIOLOGY PROCEDURE N/A 2/7/2020    Procedure: PPM generator change - dual  medtronic;  Surgeon: Kiel Field MD;  Location: [facility] CATH INVASIVE LOCATION;  Service: Cardiology;  Laterality: N/A;    CHOLECYSTECTOMY      CORONARY STENT PLACEMENT      ENDOSCOPY N/A 9/14/2022    Procedure: ESOPHAGOGASTRODUODENOSCOPY with 54fr main dilatation;  Surgeon: Enio Cota MD;  Location: [facility] ENDOSCOPY;  Service: Gastroenterology;  Laterality: N/A;  pre - dysphagia  post - s/p dilatation, watermelon stomach    HERNIA REPAIR      hital hernia    HYSTERECTOMY      PACEMAKER IMPLANTATION      REPLACEMENT TOTAL KNEE Bilateral          FAMILY HISTORY  Family History   Problem Relation Age of Onset    Heart disease Mother     Hypertension Mother     Colon polyps Mother     Heart disease Father     Hypertension Father     Stroke Father     Hypertension Brother     Heart disease Brother     Diabetes Niece     Colon cancer Sister     Hypertension Sister     Hypertension Daughter     Hypertension Son     Hypertension Maternal Aunt     Hypertension Maternal Grandmother     Hypertension Maternal Grandfather     Hypertension Paternal Grandmother     Hypertension Paternal Grandfather          SOCIAL HISTORY  Social History     Socioeconomic History    Marital status: Single   Tobacco Use    Smoking status: Never    Smokeless tobacco: Never    Tobacco comments:     caffeine use- soda   Vaping Use    Vaping status: Never Used   Substance and Sexual Activity    Alcohol use: Never    Drug use: No    Sexual activity: Defer          ALLERGIES  Accupril [quinapril hcl], Ahist [chlorpheniramine], Clarithromycin, Esomeprazole, Latex, Levalbuterol, Levocetirizine, Lipitor [atorvastatin], Omeprazole, Pravachol [pravastatin], Sulindac, Valdecoxib, Chlorcyclizine, Diclofenac sodium, Diphenhydramine, Lodine [etodolac], and Sulfa antibiotics      PHYSICAL EXAM  ED Triage Vitals [04/14/24 1959]   Temp Heart Rate Resp BP SpO2   98.3 °F (36.8 °C) 84 20 135/71 95 %      Temp src Heart Rate Source Patient Position BP Location FiO2 (%)   -- -- -- -- --       Physical Exam      GENERAL: 89-year-old chronically ill-appearing female in mild distress  HENT: NCAT: nares patent: Neck supple: Minimal C-spine tenderness  EYES: no scleral icterus  CV: regular rhythm, normal rate  RESPIRATORY: normal effort  ABDOMEN: soft, NTND: Bowel sounds positive  MUSCULOSKELETAL: no deformity: Skin tears to right hand and wrist.  She has mild tenderness to her right knee without deformity and mild L-spine tenderness.  The patient's right leg is swollen with dark red erythema and an open wound with mild purulence at the edges but no weeping.  NEURO: alert with nonfocal neuro exam  PSYCH:  calm, cooperative  SKIN: Does have a small skin tear to her right hand and right forearm    Vital signs and nursing notes reviewed.      LAB RESULTS  Recent Results (from the past 24 hour(s))   Comprehensive Metabolic Panel    Collection Time: 04/14/24  9:55 PM    Specimen: Blood   Result Value Ref Range    Glucose 158 (H) 65 - 99 mg/dL    BUN 23 8 - 23 mg/dL    Creatinine 1.13 (H) 0.57 - 1.00 mg/dL    Sodium 145 136 - 145 mmol/L    Potassium 3.9 3.5 - 5.2 mmol/L    Chloride 109 (H) 98 - 107 mmol/L    CO2 27.0 22.0 - 29.0 mmol/L    Calcium 8.4 (L) 8.6 - 10.5 mg/dL    Total Protein 6.3 6.0 - 8.5 g/dL    Albumin 3.9 3.5 - 5.2 g/dL    ALT (SGPT) 30 1 - 33 U/L    AST (SGOT) 42 (H) 1 - 32 U/L    Alkaline Phosphatase 109 39 - 117 U/L    Total Bilirubin 0.6 0.0 - 1.2 mg/dL    Globulin 2.4 gm/dL     A/G Ratio 1.6 g/dL    BUN/Creatinine Ratio 20.4 7.0 - 25.0    Anion Gap 9.0 5.0 - 15.0 mmol/L    eGFR 46.6 (L) >60.0 mL/min/1.73   Protime-INR    Collection Time: 04/14/24  9:55 PM    Specimen: Blood   Result Value Ref Range    Protime 25.8 (H) 11.7 - 14.2 Seconds    INR 2.33 (H) 0.90 - 1.10   Lactic Acid, Plasma    Collection Time: 04/14/24  9:55 PM    Specimen: Blood   Result Value Ref Range    Lactate 1.4 0.5 - 2.0 mmol/L   Procalcitonin    Collection Time: 04/14/24  9:55 PM    Specimen: Blood   Result Value Ref Range    Procalcitonin 0.05 0.00 - 0.25 ng/mL   Sedimentation Rate    Collection Time: 04/14/24  9:55 PM    Specimen: Blood   Result Value Ref Range    Sed Rate 6 0 - 30 mm/hr   C-reactive Protein    Collection Time: 04/14/24  9:55 PM    Specimen: Blood   Result Value Ref Range    C-Reactive Protein 0.61 (H) 0.00 - 0.50 mg/dL   CBC Auto Differential    Collection Time: 04/14/24  9:55 PM    Specimen: Blood   Result Value Ref Range    WBC 14.35 (H) 3.40 - 10.80 10*3/mm3    RBC 4.03 3.77 - 5.28 10*6/mm3    Hemoglobin 12.7 12.0 - 15.9 g/dL    Hematocrit 40.9 34.0 - 46.6 %    .5 (H) 79.0 - 97.0 fL    MCH 31.5 26.6 - 33.0 pg    MCHC 31.1 (L) 31.5 - 35.7 g/dL    RDW 13.5 12.3 - 15.4 %    RDW-SD 51.5 37.0 - 54.0 fl    MPV 9.8 6.0 - 12.0 fL    Platelets 138 (L) 140 - 450 10*3/mm3       Ordered the above labs and reviewed the results.        RADIOLOGY  CT Cervical Spine Without Contrast    Result Date: 4/14/2024  CT OF THE CERVICAL SPINE  HISTORY: Fall and neck pain  COMPARISON: None available.  TECHNIQUE: Axial CT imaging was obtained through the cervical spine. Coronal and sagittal reformatted images were obtained.  FINDINGS: No acute fracture or subluxation of the cervical spine is seen. There is some minimal anterolisthesis of C3 on C4. Further anterolisthesis is noted at C6-C7. Intervertebral disc space narrowing is most significant at C4-C5 and C5-C6. There is no prevertebral soft tissue swelling.  Thyroid gland is heterogeneous. Images through the lung apices demonstrate some right apical scarring. There is some mild canal stenosis at multiple levels. Neural foraminal narrowing is most significant at C4-C5 on the left.       No acute fracture or subluxation identified.  Radiation dose reduction techniques were utilized, including automated exposure control and exposure modulation based on body size.   This report was finalized on 4/14/2024 10:16 PM by Dr. Heidi Garza M.D on Workstation: [id number]      CT Head Without Contrast    Result Date: 4/14/2024  CT OF THE HEAD WITHOUT CONTRAST  HISTORY: Fall  COMPARISON: None available.  TECHNIQUE: Axial CT imaging was obtained through the brain. No IV contrast was administered.  FINDINGS: No acute intracranial hemorrhage is seen. There is diffuse atrophy. There is periventricular and deep white matter microangiopathic change. There is no midline shift or mass effect. No calvarial fracture is seen. There is some mucosal thickening within the ethmoid sinuses. Mucous retention cyst is noted within the right maxillary sinus. Mastoid air cells appear clear.      No acute intracranial findings.  Radiation dose reduction techniques were utilized, including automated exposure control and exposure modulation based on body size.   This report was finalized on 4/14/2024 10:09 PM by Dr. Heidi Garza M.D on Workstation: [id number]      XR Tibia Fibula 2 View Right, XR Knee 1 or 2 View Right    Result Date: 4/14/2024  2 VIEWS RIGHT KNEE; 2 VIEWS RIGHT TIBIA AND FIBULA  HISTORY: Fall  COMPARISON: January 14, 2018  FINDINGS: The patient is status post right knee arthroplasty. The hardware is stable in position compared to prior study. No acute fracture or subluxation of the right knee or the right tibia or fibula is seen. No large suprapatellar effusion is seen. Patient does have right lower extremity edema. This is particularly notable overlying the dorsum of the  foot. Dense vascular calcifications are noted.      No acute fracture or subluxation identified.  This report was finalized on 4/14/2024 9:58 PM by Dr. Heidi Garza M.D on Workstation: [id number]      XR Hand 3+ View Right, XR Wrist 3+ View Right    Result Date: 4/14/2024  3 VIEWS RIGHT HAND; 3 VIEWS RIGHT WRIST  HISTORY: Right wrist and hand pain  COMPARISON: None available.  FINDINGS: Right hand: No acute fracture or subluxation of the right hand is seen. There are advanced degenerative changes of the hands, most significant involving the interphalangeal joint of the thumb.  Right wrist: There are advanced degenerative changes noted throughout the right wrist, as well as at the carpometacarpal joint of the thumb. No definite acute fracture or subluxation is seen. There are dense vascular calcifications.      No acute fracture or subluxation identified.  This report was finalized on 4/14/2024 9:53 PM by Dr. Heidi Garza M.D on Workstation: [id number]      XR Spine Lumbar Complete 4+VW    Result Date: 4/14/2024  4 VIEWS LUMBAR SPINE  HISTORY: Fall  COMPARISON: October 13, 2020  FINDINGS: No acute fracture or subluxation of the lumbar spine is seen. There is anterolisthesis of L5 on S1. There is retrolisthesis of L3 on L4, and severe intervertebral disc space narrowing is noted at multiple levels. Dense calcification of the aorta is noted. There is some minimal anterolisthesis of L4 on L5.    No acute fracture or subluxation identified.  This report was finalized on 4/14/2024 9:39 PM by Dr. Heidi Garza M.D on Workstation: [id number]       Ordered the above noted radiological studies. Reviewed by me in PACS.            PROCEDURES  Procedures          MEDICATIONS GIVEN IN ER  Medications   sodium chloride 0.9 % flush 10 mL (has no administration in time range)   levoFLOXacin (LEVAQUIN) 750 mg/150 mL D5W (premix) (LEVAQUIN) 750 mg (has no administration in time range)   Lidocaine 4 % 1 patch (1  patch Transdermal Medication Applied 4/14/24 8626)   sodium chloride 0.9 % bolus 500 mL (500 mL Intravenous New Bag 4/14/24 2256)             MEDICAL DECISION MAKING, PROGRESS, and CONSULTS    All labs have been independently reviewed by me.  All radiology studies have been reviewed by me and I have also reviewed the radiology report.   EKG's independently viewed and interpreted by me.  Discussion below represents my analysis of pertinent findings related to patient's condition, differential diagnosis, treatment plan and final disposition.      Additional sources:  - External (non-ED) record review: I reviewed the patient's ID note from 3 days ago: See below:  .  She came to the [facility] emergency room on 3/30 and they gave her a dose of cefazolin and prescribed cephalexin.  She went to be seen at [facility] on 4/3 and she was prescribed doxycycline.  A wound culture was done and ended up growing Pseudomonas.  Therefore she was ultimately placed on levofloxacin 500 mg daily for 7-day course and she took the last pill today.  Overall, the area is improved in terms of erythema and warmth.  The warmth is resolved.  The erythema still persist.  She has swelling.  She has not been using her compression stockings, leg pumps, or elevating the leg particularly high over the past few weeks.     She continues on IVIg for hypogammaglobulinemia. She also follows with oncology for CLL. She remains on coumadin for Afib.    He recommended continuing Levaquin for her Pseudomonas from her leg.      - Shared decision making: After shared decision-making discussion we agreed she is admitted to the hospital for further evaluation and care      Orders placed during this visit:  Orders Placed This Encounter   Procedures    Blood Culture - Blood,    Blood Culture - Blood,    CT Head Without Contrast    CT Cervical Spine Without Contrast    XR Hand 3+ View Right    XR Wrist 3+ View Right    XR Tibia Fibula 2 View Right    XR Spine Lumbar  Complete 4+VW    XR Knee 1 or 2 View Right    Comprehensive Metabolic Panel    Protime-INR    Lactic Acid, Plasma    Procalcitonin    Sedimentation Rate    C-reactive Protein    CBC Auto Differential    Manual Differential    [facility] (on-call MD unless specified) Details    Insert Peripheral IV    Initiate Observation Status    CBC & Differential         Differential diagnosis:  My differential diagnosis includes but is not limited to cellulitis, osteomyelitis, sepsis, skull fracture, intracranial hemorrhage, lumbar fracture, knee fracture, contusion or sprain      Independent interpretation of labs, radiology studies, and discussions with consultants:  ED Course as of 04/14/24 2258   Sun Apr 14, 2024 2036 Will check CTs and x-rays and labs for further evaluation and care of the patient with worsening cellulitis despite treatment and fall with multiple injuries. [GP]   2230 Patient CT head and C-spine are negative acute [GP]   2231 My independent interpretation the patient's right knee and right tib-fib films are no fracture or dislocation [GP]   2231 My independent interpretation the patient's right wrist and right hand films are no fracture or dislocation [GP]   2231 Patient's L-spine shows no acute fracture [GP]   2231 Patient's white count is 14 which is what is been in the past.  She has a normal lactic acid with a mildly elevated CRP and normal sed rate. [GP]   2234 She was recent seen by ID who recommended continued Levaquin for a wound culture that is positive for Pseudomonas.  Thus I will order cultures on the patient and start her on IV Levaquin and admitted to the hospital for further evaluation and care. [GP]   2248 I spoke with Donna Alexandre from [facility] who is aware of the patient's fall and her open wound with cellulitis.  She will admit the patient to a telemetry bed for further evaluation and care. [GP]      ED Course User Index  [GP] Bhavik Meeks MD               DIAGNOSIS  Final diagnoses:    Cellulitis of right lower extremity   Fall, initial encounter   Contusion of head, unspecified part of head, initial encounter   Contusion of right knee, initial encounter   Chronic anticoagulation   CLL (chronic lymphocytic leukemia)   Hypogammaglobulinemia   ISTAP type 1 skin tear of right hand         DISPOSITION  ADMISSION    Discussed treatment plan and reason for admission with pt/family and admitting physician.  Pt/family voiced understanding of the plan for admission for further testing/treatment as needed.          --------------------  Please note that portions of this were completed with a voice recognition program.       Note Disclaimer: At [facility], we believe that sharing information builds trust and better relationships. You are receiving this note because you are receiving care at [facility] or recently visited. It is possible you will see health information before a provider has talked with you about it. This kind of information can be easy to misunderstand. To help you fully understand what it means for your health, we urge you to discuss this note with your provider.             Bhavik Meeks MD  04/14/24 3842

## 2024-04-15 NOTE — CASE MANAGEMENT/SOCIAL WORK
Discharge Planning Assessment  [facility]     Patient Name: Caitlyn Longoria  MRN: [id number]  Today's Date: 4/15/2024    Admit Date: 4/14/2024    Plan: [facility] via family   Discharge Needs Assessment       Row Name 04/15/24 1407       Living Environment    People in Home alone    Current Living Arrangements independent living facility    Family Caregiver if Needed other relative(s);sibling(s)    Quality of Family Relationships helpful;involved;supportive    Able to Return to Prior Arrangements yes       Transition Planning    Patient/Family Anticipates Transition to home    Patient/Family Anticipated Services at Transition     Transportation Anticipated family or friend will provide       Discharge Needs Assessment    Equipment Currently Used at Home bipap;oxygen;nebulizer;other (see comments);walker, rolling;rollator    Concerns to be Addressed discharge planning    Anticipated Changes Related to Illness none    Equipment Needed After Discharge none    Outpatient/Agency/Support Group Needs assisted living facility    Discharge Facility/Level of Care Needs independent living facility                   Discharge Plan       Row Name 04/15/24 1406       Plan    Plan [facility] via family    Patient/Family in Agreement with Plan yes    Plan Comments CCP met with pt at bedside, introduced self and role of CCP. Face sheet information and pharmacy verified. Pt lives in Independent Living at [facility]. Pt does not drive, IADL; s and has a walker, rollater, lymphedema pump, nebulizer, Bipap and nighttime O2 thru [facility]. Pt has a living will. Pt is enrolled in meds to beds and denies trouble affording or managing her medications. Pt has used [facility]  in the past and has been to [facility] and [facility] SN[facility]. DC plan is to return home, family to transport. Sujit/[facility] notified to confirm O2 setup at home. Sita PAEZ/CCP                  Continued Care and  Services - Admitted Since 4/14/2024       Destination       Service Provider Request Status Selected Services Address Phone Fax Patient Preferred    [facility] Pending - Request Sent N/A [address], [place] [facility] [address] [phone] --              Durable Medical Equipment       Service Provider Request Status Selected Services Address Phone Fax Patient Preferred    [facility] Pending - Request Sent N/A [address] [address] [phone] --                  Expected Discharge Date and Time       Expected Discharge Date Expected Discharge Time    Apr 16, 2024            Demographic Summary    No documentation.                  Functional Status       Row Name 04/15/24 1407       Functional Status    Usual Activity Tolerance good    Current Activity Tolerance moderate       Assessment of Health Literacy    How often do you have someone help you read hospital materials? Never    How often do you have problems learning about your medical condition because of difficulty understanding written information? Never    How often do you have a problem understanding what is told to you about your medical condition? Never    How confident are you filling out medical forms by yourself? Extremely    Health Literacy Excellent       Functional Status, IADL    Medications independent    Meal Preparation independent    Housekeeping independent    Laundry independent    Shopping independent                               Nimo Ellington, RN

## 2024-04-15 NOTE — PLAN OF CARE
Goal Outcome Evaluation:  Plan of Care Reviewed With: patient           Outcome Evaluation: Pt is an 88 y/o F presenting s/p fall with BLE edema and cellulitis. PMH includes T2DM, HF, a-fib, obesity, HTN, and CKD stage III. Pt greeted in supine, agreeable to PT evaluation this AM. A&O x4. Reports PLOF as living in ILF, uses RW in room and rollator when going to community rooms/dining barreto, and requires no assist with ADLs. Presents with impaired strength, ROM, endurance, and pain, limiting overall mobility. Reports 4/10 low back pain that worsens with upright mobility. With CGA-Bob x1, pt completes sup to sit, STS, and ambulation x12 ft with RW. Demonstrates good seated balance, sat EOB x~10 min to brush teeth. Also demonstrates fwd flexed posture and reduced heel strike bilaterally during gait. Reports increased stiffness in AM is her BL and takes a little bit for her body to 'loosen up.' Limited this date d/t decreased activity tolerance and fatigue. [facility] with needs met. PT will continue to follow to address functional deficits. Recommending d/c back to ILF pending progress.      Anticipated Discharge Disposition (PT): assisted living, other (see comments) (Independent living facility)

## 2024-04-15 NOTE — PROGRESS NOTES
[facility] Clinical Pharmacy Services: Warfarin Dosing/Monitoring Consult    Caitlyn Longoria is a 89 y.o. female, estimated creatinine clearance is 47.4 mL/min (by C-G formula based on SCr of 0.85 mg/dL). weighing 88.6 kg (195 lb 4.8 oz).    Results from last 7 days   Lab Units 04/15/24  0504 04/14/24  2155   INR  2.58* 2.33*   HEMOGLOBIN g/dL 11.8* 12.7   HEMATOCRIT % 37.8 40.9   PLATELETS 10*3/mm3 123* 138*     Prior to admission anticoagulation: per [facility] pt takes 2mg every Fri/Sun, 4mg AOD    Hospital Anticoagulation:  Consulting provider: KRISTIN Del Cid  Start date: 4/15  Indication: A Fib - requiring full anticoagulation  Target INR: 2 - 3 (consult says 2.5-3.5 but indication is afib and [facility] antico clinic note says goal 2-3)  Expected duration: indefinite   Bridge Therapy: No      Potential food or drug interactions:   Levofloxacin- Increase INR    Education complete?/Date: Yes; follows with [facility]    Assessment/Plan:  INR seems to be trending upward with almost a 0.3 increase from yesterday. I agree with the initial plan of giving a lower dose today in light of the interaction with levofloxacin.     Dose: Plan for  2mg x 1 dose to start this afternoon due to potential for increased INR with concomitant levofloxacin interaction. Daily INRs ordered.   Monitor for any signs or symptoms of bleeding  Follow up daily INRs and dose adjustments    Pharmacy will continue to follow until discharge or discontinuation of warfarin.     Gianfranco Pierre II, PharmD  Clinical Pharmacist

## 2024-04-15 NOTE — PLAN OF CARE
Goal Outcome Evaluation:  Plan of Care Reviewed With: patient        Progress: improving  Outcome Evaluation: vss, ra, v-paced, a/o x4. ID and pulm consulted. K replaced. Iv fluids dc'd. Wound care complete.

## 2024-04-15 NOTE — THERAPY EVALUATION
Patient Name: Caitlyn Longoria  : 1934    MRN: [id number]                              Today's Date: 4/15/2024       Admit Date: 2024    Visit Dx:     ICD-10-CM ICD-9-CM   1. Cellulitis of right lower extremity  L03.115 682.6   2. Fall, initial encounter  W19.XXXA E888.9   3. Contusion of head, unspecified part of head, initial encounter  S00.93XA 920   4. Contusion of right knee, initial encounter  S80.01XA 924.11   5. Chronic anticoagulation  Z79.01 V58.61   6. CLL (chronic lymphocytic leukemia)  C91.10 204.10   7. Hypogammaglobulinemia  D80.1 279.00   8. ISTAP type 1 skin tear of right hand  S61.411A 882.0     Patient Active Problem List   Diagnosis    Neck and shoulder pain    Arthropathy of shoulder region    Carpal tunnel syndrome of left wrist    Chronic pain of both shoulders    Chronic left shoulder pain    Arthropathy of left shoulder    Dysarthria    DM II (diabetes mellitus, type II), controlled    Paroxysmal atrial fibrillation    HLD (hyperlipidemia)    Chronic bronchitis    Coronary artery disease involving native coronary artery of native heart with angina pectoris    CVA (cerebral vascular accident)    HTN (hypertension)    CKD (chronic kidney disease), stage III    Chronic combined systolic and diastolic congestive heart failure    Infection of prosthetic right knee joint    Stasis dermatitis of right lower extremity due to peripheral venous hypertension    Current use of long term anticoagulation    Morbid obesity    Acute respiratory failure with hypoxia    Rhinovirus    Asthma with acute exacerbation    Acute respiratory failure with hypoxemia    Viral pneumonia    Hypoxia    CLL (chronic lymphoid leukemia) in relapse    Dyspnea    Anticoagulated on Coumadin    Osteoporotic compression fracture of spine    Pneumonia due to gram-negative bacteria    Pneumonia due to infectious organism    Bilateral carotid artery disease    Hypogammaglobulinemia    Hypogammaglobulinemia     Cellulitis of right lower extremity    Hypokalemia    Nocturnal hypoxia    COPD (chronic obstructive pulmonary disease)     Past Medical History:   Diagnosis Date    Aortic calcification     mild, on echo 12/17/2017    Aortic regurgitation     Trace    Asthma     Atrial fibrillation     CAD (coronary artery disease)     Carpal tunnel syndrome of left wrist     Chronic combined systolic and diastolic congestive heart failure     CKD (chronic kidney disease) stage 3, GFR 30-59 ml/min     COPD (chronic obstructive pulmonary disease)     Coronary artery disease involving native coronary artery of native heart with angina pectoris     Disc degeneration, lumbar     Diverticulosis     DM type 2 (diabetes mellitus, type 2)     GERD (gastroesophageal reflux disease)     History of aneurysm     right femoral artery s/p LHC    History of blood transfusion     History of fracture     History of heart attack     History of vitamin D deficiency     Hyperlipidemia     Hypertension     Leukemia     Mild mitral regurgitation     Mitral annular calcification     12/8/2017- echo, moderate    Osteopenia     PAF (paroxysmal atrial fibrillation)     Peripheral neuropathy     Skin cancer     Left hand    Sleep apnea     bipap    SSS (sick sinus syndrome)     Stroke (cerebrum)     TIA (transient ischemic attack) 2017    Tricuspid regurgitation     Trace     Past Surgical History:   Procedure Laterality Date    BRONCHOSCOPY Bilateral 10/6/2020    Procedure: BRONCHOSCOPY with BILATERAL LUNG washings;  Surgeon: Juno Coburn MD;  Location: [facility] ENDOSCOPY;  Service: Pulmonary;  Laterality: Bilateral;  PRE: purulent bronchitis  POST: PURULENT BRONCHITIS    BRONCHOSCOPY Bilateral 10/9/2020    Procedure: BRONCHOSCOPY with washing;  Surgeon: Juno Coburn MD;  Location: [facility] ENDOSCOPY;  Service: Pulmonary;  Laterality: Bilateral;  pre/post - mucous plug      CARDIAC CATHETERIZATION      CARDIAC ELECTROPHYSIOLOGY PROCEDURE N/A 2/7/2020     Procedure: PPM generator change - dual  medtronic;  Surgeon: Kiel Field MD;  Location: [facility] CATH INVASIVE LOCATION;  Service: Cardiology;  Laterality: N/A;    CHOLECYSTECTOMY      CORONARY STENT PLACEMENT      ENDOSCOPY N/A 9/14/2022    Procedure: ESOPHAGOGASTRODUODENOSCOPY with 54fr main dilatation;  Surgeon: Enio Cota MD;  Location: [facility] ENDOSCOPY;  Service: Gastroenterology;  Laterality: N/A;  pre - dysphagia  post - s/p dilatation, watermelon stomach    HERNIA REPAIR      hital hernia    HYSTERECTOMY      PACEMAKER IMPLANTATION      REPLACEMENT TOTAL KNEE Bilateral       General Information       [facility] Name 04/15/24 1044          Physical Therapy Time and Intention    Document Type evaluation  -DB (r) HB (t) DB (c)     Mode of Treatment individual therapy;physical therapy  -DB (r) HB (t) DB (c)       Row Name 04/15/24 1044          General Information    Patient Profile Reviewed yes  -DB (r) HB (t) DB (c)     Prior Level of Function independent:  RW and rollator  -DB (r) HB (t) DB (c)     Existing Precautions/Restrictions oxygen therapy device and L/min;fall  -DB (r) HB (t) DB (c)     Barriers to Rehab hearing deficit  wears hearing aids  -DB (r) HB (t) DB (c)       Row Name 04/15/24 1044          Living Environment    People in Home alone  -DB (r) HB (t) DB (c)       Row Name 04/15/24 1044          Home Main Entrance    Number of Stairs, Main Entrance none  -DB (r) HB (t) DB (c)       Row Name 04/15/24 1044          Stairs Within Home, Primary    Number of Stairs, Within Home, Primary none  -DB (r) HB (t) DB (c)       Row Name 04/15/24 1044          Cognition    Orientation Status (Cognition) oriented x 4  -DB (r) HB (t) DB (c)       Row Name 04/15/24 1044          Safety Issues, Functional Mobility    Impairments Affecting Function (Mobility) endurance/activity tolerance;shortness of breath;strength;range of motion (ROM);pain  -DB (r) HB (t) DB (c)               User Key  (r) = Recorded  By, (t) = Taken By, (c) = Cosigned By      Initials Name Provider Type    DB Ciara Mo, PT Physical Therapist    HB Trudy Shook, PT Student PT Student                   Mobility       Row Name 04/15/24 1042          Bed Mobility    Bed Mobility supine-sit  -DB (r) HB (t) DB (c)     Supine-Sit [place] (Bed Mobility) contact guard;minimum assist (75% patient effort);1 person assist;verbal cues  -DB (r) HB (t) DB (c)     Assistive Device (Bed Mobility) head of bed elevated;bed rails  -DB (r) HB (t) DB (c)     Comment, (Bed Mobility) Requires assist with trunk management to get to EOB, VC's with sequencing  -DB (r) HB (t) DB (c)       Row Name 04/15/24 1042          Sit-Stand Transfer    Sit-Stand [place] (Transfers) minimum assist (75% patient effort);contact guard;1 person assist  -DB (r) HB (t) DB (c)     Assistive Device (Sit-Stand Transfers) walker, front-wheeled  -DB (r) HB (t) DB (c)       Row Name 04/15/24 1042          Gait/Stairs (Locomotion)    [place] Level (Gait) minimum assist (75% patient effort);contact guard  -DB (r) HB (t) DB (c)     Assistive Device (Gait) walker, front-wheeled  -DB (r) HB (t) DB (c)     Distance in Feet (Gait) 12  -DB (r) HB (t) DB (c)     Deviations/Abnormal Patterns (Gait) stride length decreased;gait speed decreased;jeremi decreased;festinating/shuffling;base of support, narrow  -DB (r) HB (t) DB (c)     Bilateral Gait Deviations heel strike decreased;forward flexed posture  -DB (r) HB (t) DB (c)     [place] Level (Stairs) not tested  -DB (r) HB (t) DB (c)     Comment, (Gait/Stairs) Demonstrates fwd flexed posture and intermittent absent heel strike, requires increased time to complete, reports being 'stiff' when first getting out of bed in the AM is her BL  -DB (r) HB (t) DB (c)               User Key  (r) = Recorded By, (t) = Taken By, (c) = Cosigned By      Initials Name Provider Type    Ciara Costa, PT Physical Therapist    Trudy Bautista,  PT Student PT Student                   Obj/Interventions       Row Name 04/15/24 1039          Range of Motion Comprehensive    General Range of Motion bilateral lower extremity ROM WFL  -DB (r) HB (t) DB (c)     Comment, General Range of Motion Limited B ankle ROM d/t edema  -DB (r) HB (t) DB (c)       Row Name 04/15/24 1039          Strength Comprehensive (MMT)    General Manual Muscle Testing (MMT) Assessment lower extremity strength deficits identified  -DB (r) HB (t) DB (c)     Comment, General Manual Muscle Testing (MMT) Assessment BLE grossly 3+/5, limited by pain  -DB (r) HB (t) DB (c)       Row Name 04/15/24 1039          Balance    Balance Assessment sitting static balance;sitting dynamic balance;standing static balance;standing dynamic balance  -DB (r) HB (t) DB (c)     Static Sitting Balance standby assist;supervision  -DB (r) HB (t) DB (c)     Dynamic Sitting Balance standby assist  -DB (r) HB (t) DB (c)     Position, Sitting Balance unsupported;sitting edge of bed  -DB (r) HB (t) DB (c)     Static Standing Balance contact guard  -DB (r) HB (t) DB (c)     Dynamic Standing Balance contact guard;minimal assist  -DB (r) HB (t) DB (c)     Position/Device Used, Standing Balance supported;walker, front-wheeled  -DB (r) HB (t) DB (c)     Balance Interventions sitting;standing;sit to stand  -DB (r) HB (t) DB (c)     Comment, Balance Sat EOB x10 min with no LOB while brushing teeth, slight unsteadiness noted with ambulation but pt reports this is her BL when first getting out of bed in the mornings  -DB (r) HB (t) DB (c)               User Key  (r) = Recorded By, (t) = Taken By, (c) = Cosigned By      Initials Name Provider Type    Ciara Costa, PT Physical Therapist    Trudy Bautista, PT Student PT Student                   Goals/Plan       Row Name 04/15/24 1029          Bed Mobility Goal 1 (PT)    Activity/Assistive Device (Bed Mobility Goal 1, PT) bed mobility activities, all  -DB (r) HB (t) DB (c)      [place] Level/Cues Needed (Bed Mobility Goal 1, PT) supervision required;standby assist  -DB (r) HB (t) DB (c)     Time Frame (Bed Mobility Goal 1, PT) 1 week  -DB (r) HB (t) DB (c)       Row Name 04/15/24 1029          Transfer Goal 1 (PT)    Activity/Assistive Device (Transfer Goal 1, PT) sit-to-stand/stand-to-sit  -DB (r) HB (t) DB (c)     [place] Level/Cues Needed (Transfer Goal 1, PT) standby assist;supervision required  -DB (r) HB (t) DB (c)       Row Name 04/15/24 1029          Gait Training Goal 1 (PT)    Activity/Assistive Device (Gait Training Goal 1, PT) gait (walking locomotion)  -DB (r) HB (t) DB (c)     [place] Level (Gait Training Goal 1, PT) supervision required;standby assist  -DB (r) HB (t) DB (c)     Distance (Gait Training Goal 1, PT) Pt to ambulate 150 ft with RW  -DB (r) HB (t) DB (c)     Time Frame (Gait Training Goal 1, PT) 1 week  -DB (r) HB (t) DB (c)       Row Name 04/15/24 1029          Therapy Assessment/Plan (PT)    Planned Therapy Interventions (PT) balance training;bed mobility training;gait training;home exercise program;patient/family education;transfer training;stair training;strengthening;ROM (range of motion)  -DB (r) HB (t) DB (c)               User Key  (r) = Recorded By, (t) = Taken By, (c) = Cosigned By      Initials Name Provider Type    DB Ciara Mo, PT Physical Therapist    Trudy Bautista, PT Student PT Student                   Clinical Impression       Row Name 04/15/24 1031          Pain    Pretreatment Pain Rating 4/10  -DB (r) HB (t) DB (c)     Posttreatment Pain Rating 4/10  -DB (r) HB (t) DB (c)     Pain Location lower  -DB (r) HB (t) DB (c)     Pain Location - back  -DB (r) HB (t) DB (c)     Pain Intervention(s) Repositioned;Rest;Ambulation/increased activity  -DB (r) HB (t) DB (c)       Row Name 04/15/24 1031          Plan of Care Review    Plan of Care Reviewed With patient  -DB (r) HB (t) DB (c)     Outcome Evaluation Pt is an 89 y/o F  presenting s/p fall with BLE edema and cellulitis. PMH includes T2DM, HF, a-fib, obesity, HTN, and CKD stage III. Pt greeted in supine, agreeable to PT evaluation this AM. A&O x4. Reports PLOF as living in \[facility]", uses RW in room and rollator when going to community rooms/dining barreto, and requires no assist with ADLs. Presents with impaired strength, ROM, endurance, and pain, limiting overall mobility. Reports 4/10 low back pain that worsens with upright mobility. With CGA-Bob x1, pt completes sup to sit, STS, and ambulation x12 ft with RW. Demonstrates good seated balance, sat EOB x~10 min to brush teeth. Also demonstrates fwd flexed posture and reduced heel strike bilaterally during gait. Reports increased stiffness in AM is her BL and takes a little bit for her body to 'loosen up.' Limited this date d/t decreased activity tolerance and fatigue. Pt left [facility] with needs met. PT will continue to follow to address functional deficits. Recommending d/c back to \[facility]\"" pending progress.  -DB (r) HB (t) DB (c)       Row Name 04/15/24 1031          Therapy Assessment/Plan (PT)    Rehab Potential (PT) good, to achieve stated therapy goals  -DB (r) HB (t) DB (c)     Criteria for Skilled Interventions Met (PT) yes;meets criteria  -DB (r) HB (t) DB (c)     Therapy Frequency (PT) 5 times/wk  -DB (r) HB (t) DB (c)       Row Name 04/15/24 1031          Vital Signs    Pre SpO2 (%) 98  -DB (r) HB (t) DB (c)     O2 Delivery Pre Treatment supplemental O2  2.5 L  -DB (r) HB (t) DB (c)     Intra SpO2 (%) 92  -DB (r) HB (t) DB (c)     O2 Delivery Intra Treatment supplemental O2  2.5 L  -DB (r) HB (t) DB (c)     Post SpO2 (%) 96  -DB (r) HB (t) DB (c)     O2 Delivery Post Treatment supplemental O2  2.5 L  -DB (r) HB (t) DB (c)     Pre Patient Position Supine  -DB (r) HB (t) DB (c)     Intra Patient Position Standing  -DB (r) HB (t) DB (c)     Post Patient Position Sitting  -DB (r) HB (t) DB (c)       Row Name 04/15/24 1039           Positioning and Restraints    Pre-Treatment Position in bed  -DB (r) HB (t) DB (c)     Post Treatment Position chair  -DB (r) HB (t) DB (c)     In Chair notified nsg;reclined;call light within reach;encouraged to call for assist;exit alarm on  -DB (r) HB (t) DB (c)               User Key  (r) = Recorded By, (t) = Taken By, (c) = Cosigned By      Initials Name Provider Type    DB Ciara Mo, PT Physical Therapist    Trudy Buatista, PT Student PT Student                   Outcome Measures       Row Name 04/15/24 1029 04/15/24 0048       How much help from another person do you currently need...    Turning from your back to your side while in flat bed without using bedrails? 3  -DB (r) HB (t) DB (c) 4  -AM    Moving from lying on back to sitting on the side of a flat bed without bedrails? 3  -DB (r) HB (t) DB (c) 4  -AM    Moving to and from a bed to a chair (including a wheelchair)? 3  -DB (r) HB (t) DB (c) 3  -AM    Standing up from a chair using your arms (e.g., wheelchair, bedside chair)? 3  -DB (r) HB (t) DB (c) 3  -AM    Climbing 3-5 steps with a railing? 2  -DB (r) HB (t) DB (c) 2  -AM    To walk in hospital room? 3  -DB (r) HB (t) DB (c) 3  -AM    AM-PAC 6 Clicks Score (PT) 17  -DB (r) HB (t) 19  -AM    Highest Level of Mobility Goal 5 --> Static standing  -DB (r) HB (t) 6 --> Walk 10 steps or more  -AM      Row Name 04/15/24 1029          Functional Assessment    Outcome Measure Options AM-PAC 6 Clicks Basic Mobility (PT)  -DB (r) HB (t) DB (c)               User Key  (r) = Recorded By, (t) = Taken By, (c) = Cosigned By      Initials Name Provider Type    DB Ciara Mo, PT Physical Therapist    Ezekiel Coto, RN Registered Nurse    Trudy Bautista, PT Student PT Student                                 Physical Therapy Education       Title: PT OT SLP Therapies (Done)       Topic: Physical Therapy (Done)       Point: Mobility training (Done)       Learning Progress Summary             Patient  Acceptance, E, VU,NR by  at 4/15/2024 1029                         Point: Home exercise program (Done)       Learning Progress Summary             Patient Acceptance, E, VU,NR by  at 4/15/2024 1029                         Point: Body mechanics (Done)       Learning Progress Summary             Patient Acceptance, E, VU,NR by  at 4/15/2024 1029                         Point: Precautions (Done)       Learning Progress Summary             Patient Acceptance, E, VU,NR by  at 4/15/2024 1029                                         User Key       Initials Effective Dates Name Provider Type Discipline     02/21/24 -  Trudy Shook, PT Student PT Student PT                  PT Recommendation and Plan  Planned Therapy Interventions (PT): balance training, bed mobility training, gait training, home exercise program, patient/family education, transfer training, stair training, strengthening, ROM (range of motion)  Plan of Care Reviewed With: patient  Outcome Evaluation: Pt is an 89 y/o F presenting s/p fall with BLE edema and cellulitis. PMH includes T2DM, HF, a-fib, obesity, HTN, and CKD stage III. Pt greeted in supine, agreeable to PT evaluation this AM. A&O x4. Reports PLOF as living in ILF, uses RW in room and rollator when going to community rooms/dining barreto, and requires no assist with ADLs. Presents with impaired strength, ROM, endurance, and pain, limiting overall mobility. Reports 4/10 low back pain that worsens with upright mobility. With CGA-Bob x1, pt completes sup to sit, STS, and ambulation x12 ft with RW. Demonstrates good seated balance, sat EOB x~10 min to brush teeth. Also demonstrates fwd flexed posture and reduced heel strike bilaterally during gait. Reports increased stiffness in AM is her BL and takes a little bit for her body to 'loosen up.' Limited this date d/t decreased activity tolerance and fatigue. Pt [facility] with needs met. PT will continue to follow to address functional deficits.  Recommending d/c back to ILF pending progress.     Time Calculation:         PT Charges       Row Name 04/15/24 1027             Time Calculation    Start Time 0835  -DB (r) HB (t) DB (c)      Stop Time 0902  -DB (r) HB (t) DB (c)      Time Calculation (min) 27 min  -DB (r) HB (t)      PT Received On 04/15/24  -DB (r) HB (t) DB (c)      PT - Next Appointment 04/16/24  -DB (r) HB (t) DB (c)      PT Goal Re-Cert Due Date 04/22/24  -DB (r) HB (t) DB (c)         Time Calculation- PT    Total Timed Code Minutes- PT 12 minute(s)  -DB (r) HB (t) DB (c)         Timed Charges    98171 - PT Therapeutic Activity Minutes 12  -DB (r) HB (t) DB (c)         Total Minutes    Timed Charges Total Minutes 12  -DB (r) HB (t)       Total Minutes 12  -DB (r) HB (t)                User Key  (r) = Recorded By, (t) = Taken By, (c) = Cosigned By      Initials Name Provider Type    DB Ciara Mo, PT Physical Therapist    HB Trudy Shook, PT Student PT Student                  Therapy Charges for Today       Code Description Service Date Service Provider Modifiers Qty    15880062935 HC PT THERAPEUTIC ACT EA 15 MIN 4/15/2024 Trudy Shook, PT Student GP 1    58641044286 HC PT EVAL MOD COMPLEXITY 3 4/15/2024 Trudy Shook, PT Student GP 1            PT G-Codes  Outcome Measure Options: AM-PAC 6 Clicks Basic Mobility (PT)  AM-PAC 6 Clicks Score (PT): 17  PT Discharge Summary  Anticipated Discharge Disposition (PT): assisted living, other (see comments) (Independent living facility)    Trudy Shook PT Student  4/15/2024

## 2024-04-16 ENCOUNTER — READMISSION MANAGEMENT (OUTPATIENT)
Dept: CALL CENTER | Facility: HOSPITAL | Age: 89
End: 2024-04-16
Payer: MEDICARE

## 2024-04-16 ENCOUNTER — HOME HEALTH ADMISSION (OUTPATIENT)
Dept: HOME HEALTH SERVICES | Facility: HOME HEALTHCARE | Age: 89
End: 2024-04-16
Payer: MEDICARE

## 2024-04-16 VITALS
DIASTOLIC BLOOD PRESSURE: 63 MMHG | BODY MASS INDEX: 34.14 KG/M2 | HEIGHT: 63 IN | SYSTOLIC BLOOD PRESSURE: 98 MMHG | HEART RATE: 82 BPM | WEIGHT: 192.7 LBS | RESPIRATION RATE: 18 BRPM | TEMPERATURE: 97.3 F | OXYGEN SATURATION: 97 %

## 2024-04-16 LAB
GLUCOSE BLDC GLUCOMTR-MCNC: 124 MG/DL (ref 70–130)
GLUCOSE BLDC GLUCOMTR-MCNC: 217 MG/DL (ref 70–130)
INR PPP: 2.5 (ref 0.9–1.1)
MAGNESIUM SERPL-MCNC: 1.8 MG/DL (ref 1.6–2.4)
PROTHROMBIN TIME: 27.2 SECONDS (ref 11.7–14.2)

## 2024-04-16 PROCEDURE — 99214 OFFICE O/P EST MOD 30 MIN: CPT | Performed by: INTERNAL MEDICINE

## 2024-04-16 PROCEDURE — 97165 OT EVAL LOW COMPLEX 30 MIN: CPT

## 2024-04-16 PROCEDURE — G0378 HOSPITAL OBSERVATION PER HR: HCPCS

## 2024-04-16 PROCEDURE — 82948 REAGENT STRIP/BLOOD GLUCOSE: CPT

## 2024-04-16 PROCEDURE — 94664 DEMO&/EVAL PT USE INHALER: CPT

## 2024-04-16 PROCEDURE — 63710000001 INSULIN LISPRO (HUMAN) PER 5 UNITS: Performed by: NURSE PRACTITIONER

## 2024-04-16 PROCEDURE — 83735 ASSAY OF MAGNESIUM: CPT | Performed by: HOSPITALIST

## 2024-04-16 PROCEDURE — 94799 UNLISTED PULMONARY SVC/PX: CPT

## 2024-04-16 PROCEDURE — 97535 SELF CARE MNGMENT TRAINING: CPT

## 2024-04-16 PROCEDURE — 94761 N-INVAS EAR/PLS OXIMETRY MLT: CPT

## 2024-04-16 PROCEDURE — 85610 PROTHROMBIN TIME: CPT | Performed by: NURSE PRACTITIONER

## 2024-04-16 RX ORDER — LEVOFLOXACIN 750 MG/1
750 TABLET, FILM COATED ORAL EVERY OTHER DAY
Status: DISCONTINUED | OUTPATIENT
Start: 2024-04-17 | End: 2024-04-16 | Stop reason: HOSPADM

## 2024-04-16 RX ADMIN — OXYBUTYNIN CHLORIDE 10 MG: 10 TABLET, EXTENDED RELEASE ORAL at 08:15

## 2024-04-16 RX ADMIN — ACETAMINOPHEN 325MG 650 MG: 325 TABLET ORAL at 06:35

## 2024-04-16 RX ADMIN — ARFORMOTEROL TARTRATE 15 MCG: 15 SOLUTION RESPIRATORY (INHALATION) at 10:36

## 2024-04-16 RX ADMIN — Medication 10 ML: at 08:16

## 2024-04-16 RX ADMIN — CETIRIZINE HYDROCHLORIDE 10 MG: 10 TABLET ORAL at 08:15

## 2024-04-16 RX ADMIN — CARVEDILOL 3.12 MG: 3.12 TABLET, FILM COATED ORAL at 08:15

## 2024-04-16 RX ADMIN — CEPHALEXIN 500 MG: 500 CAPSULE ORAL at 05:51

## 2024-04-16 RX ADMIN — FUROSEMIDE 20 MG: 20 TABLET ORAL at 08:15

## 2024-04-16 RX ADMIN — FLUTICASONE PROPIONATE 1 SPRAY: 50 SPRAY, METERED NASAL at 08:16

## 2024-04-16 RX ADMIN — INSULIN LISPRO 5 UNITS: 100 INJECTION, SOLUTION INTRAVENOUS; SUBCUTANEOUS at 11:46

## 2024-04-16 RX ADMIN — BUDESONIDE 0.5 MG: 0.5 SUSPENSION RESPIRATORY (INHALATION) at 10:39

## 2024-04-16 NOTE — PLAN OF CARE
Goal Outcome Evaluation:  Plan of Care Reviewed With: patient        Progress: improving  Outcome Evaluation: Ready for discharge going home with home health

## 2024-04-16 NOTE — CASE MANAGEMENT/SOCIAL WORK
Continued Stay Note  [facility]     Patient Name: Caitlyn Longoria  MRN: [id number]  Today's Date: 4/16/2024    Admit Date: 4/14/2024    Plan: [facility].   Discharge Plan       Row Name 04/16/24 1335       Plan    Plan [facility].    Patient/Family in Agreement with Plan yes    Plan Comments Pt discussed in AM MD rocael plans to DC. RN notified CCP pt is interested in HH. CCP met with pt at bedside to discuss HH. Pt stated she would like a referral made to [facility] as that is which agency her [facility] would like for pt to use. [facility] notified and are able to accept. Sita PAEZ/CCP    Final Discharge Disposition Code 06 - home with home health care    Final Note [facility]. No additional CCP needs.                   Discharge Codes    No documentation.                 Expected Discharge Date and Time       Expected Discharge Date Expected Discharge Time    Apr 16, 2024               Nimo Ellington RN

## 2024-04-16 NOTE — PROGRESS NOTES
[facility] Clinical Pharmacy Services: Warfarin Dosing/Monitoring Consult    Caitlyn Longoria is a 89 y.o. female, estimated creatinine clearance is 47 mL/min (by C-G formula based on SCr of 0.85 mg/dL). weighing 87.4 kg (192 lb 11.2 oz).    Results from last 7 days   Lab Units 04/16/24  0529 04/15/24  0504 04/14/24  2155   INR  2.50* 2.58* 2.33*   HEMOGLOBIN g/dL  --  11.8* 12.7   HEMATOCRIT %  --  37.8 40.9   PLATELETS 10*3/mm3  --  123* 138*     Prior to admission anticoagulation: per [facility] pt takes 2mg every Fri/Sun, 4mg AOD    Hospital Anticoagulation:  Consulting provider: KRISTIN Del Cid  Start date: 4/15  Indication: A Fib - requiring full anticoagulation  Target INR: 2 - 3 (consult says 2.5-3.5 but indication is afib and [facility] clinic note says goal 2-3)  Expected duration: indefinite   Bridge Therapy: No      Potential food or drug interactions:   Levofloxacin- Increase INR  Budesonide (inhaled) - may have little affect since not systemic     Education complete?/Date: Yes; follows with [facility]    Assessment/Plan:  INR 2.5, it appears the interactions between levofloxacin and warfarin were not as pronounced as expected. Will return back to patient's home regimen at this time. Will schedule regimen once I see how patient reacts on 4 mg doses.    Dose: Plan for  4mg x 1 dose. Daily INRs ordered.   Monitor for any signs or symptoms of bleeding  Follow up daily INRs and dose adjustments    Pharmacy will continue to follow until discharge or discontinuation of warfarin.     Gianfranco Pierre II, PharmD  Clinical Pharmacist

## 2024-04-16 NOTE — PLAN OF CARE
Goal Outcome Evaluation:  Plan of Care Reviewed With: patient           Outcome Evaluation: OT/Lymphedema: Pt has received lymph. tx. in the past. Pt. presents with RLE mod increased edema /cellulitis, wound R LE (dressings in place) hx. fall. Edema is 3+ RLE and mild pitting 1+ LLE mostly foot to knee. Pt. has compression stockings, a compression pump, Tubigrip, ace wraps with gauze at home. Pt. lives in [facility]. living facility and can receive assistance for compression wraps. Pt. reports HH will be following her for RLE wound. Discussed skin care, elevation and HEP. Recommend pt. to hold wearing her compression stockings  until her edema is reduced. Reviewed wearing schedule for stockings and tubigrip. Discussed velcro compression as an option. Pt. can call if she has any questions.

## 2024-04-16 NOTE — PROGRESS NOTES
ID NOTE    CC: f/u RLE wound w/ infection due to Pseudomonas    Subj: No fever. Swelling better. No new concerns.       Medications:    Current Facility-Administered Medications:     acetaminophen (TYLENOL) tablet 650 mg, 650 mg, Oral, Q4H PRN, 650 mg at 04/16/24 0635 **OR** acetaminophen (TYLENOL) 160 MG/5ML oral solution 650 mg, 650 mg, Oral, Q4H PRN **OR** acetaminophen (TYLENOL) suppository 650 mg, 650 mg, Rectal, Q4H PRN, Penny Alexandre APRN    albuterol (PROVENTIL) nebulizer solution 0.083% 2.5 mg/3mL, 2.5 mg, Nebulization, Q6H PRN, Jonah Lanier MD, 2.5 mg at 04/15/24 1333    arformoterol (BROVANA) nebulizer solution 15 mcg, 15 mcg, Nebulization, BID - RT, Shane Sears MD, 15 mcg at 04/15/24 1928    sennosides-docusate (PERICOLACE) 8.6-50 MG per tablet 2 tablet, 2 tablet, Oral, BID PRN **AND** polyethylene glycol (MIRALAX) packet 17 g, 17 g, Oral, Daily PRN **AND** bisacodyl (DULCOLAX) EC tablet 5 mg, 5 mg, Oral, Daily PRN **AND** bisacodyl (DULCOLAX) suppository 10 mg, 10 mg, Rectal, Daily PRN, Penny Alexandre APRN    budesonide (PULMICORT) nebulizer solution 0.5 mg, 0.5 mg, Nebulization, BID - RT, Jonah Lanier MD, 0.5 mg at 04/15/24 1928    carvedilol (COREG) tablet 3.125 mg, 3.125 mg, Oral, BID With Meals, Jonah Laneir MD, 3.125 mg at 04/16/24 0815    cephalexin (KEFLEX) capsule 500 mg, 500 mg, Oral, Q8H, Mehul Miller MD, 500 mg at 04/16/24 0551    cetirizine (zyrTEC) tablet 10 mg, 10 mg, Oral, Daily, Lanier, Jonah B, MD, 10 mg at 04/16/24 0815    cyclobenzaprine (FLEXERIL) tablet 10 mg, 10 mg, Oral, TID PRN, Jonah Lanier MD, 10 mg at 04/15/24 2300    dextrose (D50W) (25 g/50 mL) IV injection 25 g, 25 g, Intravenous, Q15 Min PRN, Penny Alexandre APRN    dextrose (GLUTOSE) oral gel 15 g, 15 g, Oral, Q15 Min PRN, Penny Alexandre APRN    fluticasone (FLONASE) 50 MCG/ACT nasal spray 1 spray, 1 spray, Nasal, Daily, Jonah Lanier MD, 1 spray at  04/16/24 0816    furosemide (LASIX) tablet 20 mg, 20 mg, Oral, Daily, Jonah Lanier MD, 20 mg at 04/16/24 0815    glucagon (GLUCAGEN) injection 1 mg, 1 mg, Intramuscular, Q15 Min PRN, Penny Alexandre APRN    insulin lispro (HUMALOG/ADMELOG) injection 3-14 Units, 3-14 Units, Subcutaneous, 4x Daily AC & at Bedtime, Penny Alexandre APRN, 3 Units at 04/15/24 2039    [START ON 4/17/2024] levoFLOXacin (LEVAQUIN) tablet 750 mg, 750 mg, Oral, Every Other Day, Mehul Miller MD    Lidocaine 4 % 1 patch, 1 patch, Transdermal, Q24H, Bhavik Meeks MD, 1 patch at 04/15/24 2258    melatonin tablet 5 mg, 5 mg, Oral, Nightly, Jonah Lanier MD, 5 mg at 04/15/24 2039    montelukast (SINGULAIR) tablet 10 mg, 10 mg, Oral, Nightly, Jonah Lanier MD, 10 mg at 04/15/24 2039    nitroglycerin (NITROSTAT) SL tablet 0.4 mg, 0.4 mg, Sublingual, Q5 Min PRN, Penny Alexandre APRN    oxybutynin XL (DITROPAN-XL) 24 hr tablet 10 mg, 10 mg, Oral, BID, Jonah Lanier MD, 10 mg at 04/16/24 0815    Pharmacy to dose warfarin, , Does not apply, Continuous PRN, Penny Alexandre APRN    Potassium Replacement - Follow Nurse / BPA Driven Protocol, , Does not apply, PRN, Jonah Lnaier MD    rosuvastatin (CRESTOR) tablet 20 mg, 20 mg, Oral, Nightly, Jonah Lanier MD, 20 mg at 04/15/24 2039    [COMPLETED] Insert Peripheral IV, , , Once **AND** sodium chloride 0.9 % flush 10 mL, 10 mL, Intravenous, PRN, Bhavik Meeks MD    sodium chloride 0.9 % flush 10 mL, 10 mL, Intravenous, Q12H, Penny Alexandre APRN, 10 mL at 04/16/24 0816    sodium chloride 0.9 % flush 10 mL, 10 mL, Intravenous, PRN, Penny Alexandre APRN    sodium chloride 0.9 % infusion 40 mL, 40 mL, Intravenous, PRN, Penny Alexandre APRN      Objective   Vital Signs   Temp:  [97.3 °F (36.3 °C)-97.9 °F (36.6 °C)] 97.3 °F (36.3 °C)  Heart Rate:  [80-82] 80  Resp:  [18] 18  BP: ()/(60-71) 98/63    Physical Exam:    General: awake, alert, NAD, very nice, sitting up in chair  Eyes: no scleral icterus  CV: Normal rate, pitting lower extremity edema  Respiratory: normal work of breathing ambient air  Musculoskeletal: R knee incision is healed; not warm or tender  Skin: Right lower extremity is wrapped  Neurological: Alert and oriented x 3  Psychiatric: Normal mood and affect     Labs:   Blood cultures reviewed today  Lab Results   Component Value Date    WBC 11.86 (H) 04/15/2024    HGB 11.8 (L) 04/15/2024    HCT 37.8 04/15/2024    MCV 99.2 (H) 04/15/2024     (L) 04/15/2024     Lab Results   Component Value Date    GLUCOSE 94 04/15/2024    CALCIUM 8.0 (L) 04/15/2024     04/15/2024    K 4.4 04/15/2024    CO2 20.9 (L) 04/15/2024     (H) 04/15/2024    BUN 22 04/15/2024    CREATININE 0.85 04/15/2024    EGFR 65.6 04/15/2024    BCR 25.9 (H) 04/15/2024    ANIONGAP 12.1 04/15/2024     Lab Results   Component Value Date    ALT 30 04/14/2024     Lab Results   Component Value Date    CRP 0.61 (H) 04/14/2024     Lab Results   Component Value Date    HGBA1C 7.4 (H) 01/18/2024     Lab Results   Component Value Date    INR 2.50 (H) 04/16/2024    INR 2.58 (H) 04/15/2024    INR 2.33 (H) 04/14/2024    PROTIME 27.2 (H) 04/16/2024    PROTIME 27.9 (H) 04/15/2024    PROTIME 25.8 (H) 04/14/2024       Microbiology:  4/14 BCx: Negative to date    Prior radiology:   CT head no acute process  2.  CT cervical spine: No acute fracture  3.  X-ray right wrist no acute fracture  4.  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  She tells  me that OT/lymphedema just came by and gave her some instructions.  Wound care ostomy nurse came by yesterday and added topical therapies.  I recommend that she continue levofloxacin 500 mg daily through 4/18/2024.  As mentioned by others, I agree that she would benefit from home health wound care.    She should continue her chronic suppressive cephalexin 500 mg p.o. every 8 hours for history of right knee PJI due to MSSA.    Thank you for allowing me to be involved in the care of this patient. Infectious diseases will sign off at this time with antibiotics plan in place, but please call me at [phone] if any further ID questions or new ID concerns.      Addendum: Will resume patient's chronic prophylactic cephalexin 500 mg p.o. every 8 hours.

## 2024-04-16 NOTE — CASE MANAGEMENT/SOCIAL WORK
Case Management Discharge Note      Final Note: [facility] with [facility]. No additional CCP needs.         Selected Continued Care - Admitted Since 4/14/2024       Destination    No services have been selected for the patient.                Durable Medical Equipment    No services have been selected for the patient.                Dialysis/Infusion    No services have been selected for the patient.                Home Medical Care       Service Provider Selected Services Address Phone Fax Patient Preferred    [facility] ,  Home Medical  ,  Home Rehabilitation ,  [facility] [address] [place] [address], [address] [id number] [phone] --              Therapy    No services have been selected for the patient.                Community Resources    No services have been selected for the patient.                Community & DME    No services have been selected for the patient.                         Final Discharge Disposition Code: 06 - home with home health care

## 2024-04-16 NOTE — THERAPY EVALUATION
Acute Care - Occupational Therapy Lymphedema Initial Evaluation  [facility]     Patient Name: Caitlyn Longoria  : 1934  MRN: [id number]  Today's Date: 2024             Admit Date: 2024       ICD-10-CM ICD-9-CM   1. Cellulitis of right lower extremity  L03.115 682.6   2. Fall, initial encounter  W19.XXXA E888.9   3. Contusion of head, unspecified part of head, initial encounter  S00.93XA 920   4. Contusion of right knee, initial encounter  S80.01XA 924.11   5. Chronic anticoagulation  Z79.01 V58.61   6. CLL (chronic lymphocytic leukemia)  C91.10 204.10   7. Hypogammaglobulinemia  D80.1 279.00   8. ISTAP type 1 skin tear of right hand  S61.411A 882.0     Patient Active Problem List   Diagnosis    Neck and shoulder pain    Arthropathy of shoulder region    Carpal tunnel syndrome of left wrist    Chronic pain of both shoulders    Chronic left shoulder pain    Arthropathy of left shoulder    Dysarthria    DM II (diabetes mellitus, type II), controlled    Paroxysmal atrial fibrillation    HLD (hyperlipidemia)    Chronic bronchitis    Coronary artery disease involving native coronary artery of native heart with angina pectoris    CVA (cerebral vascular accident)    HTN (hypertension)    CKD (chronic kidney disease), stage III    Chronic combined systolic and diastolic congestive heart failure    Infection of prosthetic right knee joint    Stasis dermatitis of right lower extremity due to peripheral venous hypertension    Current use of long term anticoagulation    Morbid obesity    Acute respiratory failure with hypoxia    Rhinovirus    Asthma with acute exacerbation    Acute respiratory failure with hypoxemia    Viral pneumonia    Hypoxia    CLL (chronic lymphoid leukemia) in relapse    Dyspnea    Anticoagulated on Coumadin    Osteoporotic compression fracture of spine    Pneumonia due to gram-negative bacteria    Pneumonia due to infectious organism    Bilateral carotid artery disease     Hypogammaglobulinemia    Hypogammaglobulinemia    Cellulitis of right lower extremity    Hypokalemia    Nocturnal hypoxia    COPD (chronic obstructive pulmonary disease)     Past Medical History:   Diagnosis Date    Aortic calcification     mild, on echo 12/17/2017    Aortic regurgitation     Trace    Asthma     Atrial fibrillation     CAD (coronary artery disease)     Carpal tunnel syndrome of left wrist     Chronic combined systolic and diastolic congestive heart failure     CKD (chronic kidney disease) stage 3, GFR 30-59 ml/min     COPD (chronic obstructive pulmonary disease)     Coronary artery disease involving native coronary artery of native heart with angina pectoris     Disc degeneration, lumbar     Diverticulosis     DM type 2 (diabetes mellitus, type 2)     GERD (gastroesophageal reflux disease)     History of aneurysm     right femoral artery s/p LHC    History of blood transfusion     History of fracture     History of heart attack     History of vitamin D deficiency     Hyperlipidemia     Hypertension     Leukemia     Mild mitral regurgitation     Mitral annular calcification     12/8/2017- echo, moderate    Osteopenia     PAF (paroxysmal atrial fibrillation)     Peripheral neuropathy     Skin cancer     Left hand    Sleep apnea     bipap    SSS (sick sinus syndrome)     Stroke (cerebrum)     TIA (transient ischemic attack) 2017    Tricuspid regurgitation     Trace     Past Surgical History:   Procedure Laterality Date    BRONCHOSCOPY Bilateral 10/6/2020    Procedure: BRONCHOSCOPY with BILATERAL LUNG washings;  Surgeon: Juno Coburn MD;  Location: [facility] ENDOSCOPY;  Service: Pulmonary;  Laterality: Bilateral;  PRE: purulent bronchitis  POST: PURULENT BRONCHITIS    BRONCHOSCOPY Bilateral 10/9/2020    Procedure: BRONCHOSCOPY with washing;  Surgeon: Juno Coburn MD;  Location: [facility] ENDOSCOPY;  Service: Pulmonary;  Laterality: Bilateral;  pre/post - mucous plug      CARDIAC CATHETERIZATION       CARDIAC ELECTROPHYSIOLOGY PROCEDURE N/A 2/7/2020    Procedure: PPM generator change - dual  medtronic;  Surgeon: Kiel Field MD;  Location: [facility] CATH INVASIVE LOCATION;  Service: Cardiology;  Laterality: N/A;    CHOLECYSTECTOMY      CORONARY STENT PLACEMENT      ENDOSCOPY N/A 9/14/2022    Procedure: ESOPHAGOGASTRODUODENOSCOPY with 54fr main dilatation;  Surgeon: Enio Cota MD;  Location: [facility] ENDOSCOPY;  Service: Gastroenterology;  Laterality: N/A;  pre - dysphagia  post - s/p dilatation, watermelon stomach    HERNIA REPAIR      hital hernia    HYSTERECTOMY      PACEMAKER IMPLANTATION      REPLACEMENT TOTAL KNEE Bilateral         Lymphedema       Row Name 04/16/24 0800             Subjective Pain    Able to rate subjective pain? yes  -CW      Pre-Treatment Pain Level 0  -CW      Post-Treatment Pain Level 0  -CW      Subjective Pain Comment at rest  -CW      Recorded by [CW] Radha Lane OTR              Subjective    Subjective Comments Pain LE's can be at 4/10 when moving.  -CW      Recorded by [CW] Radha Lane OTR              Lymphedema Assessment    Lymphedema Classification RLE:;LLE:;stage 2 (Spontaneously Irreversible)  -CW      Recorded by [CW] Radha Lane OTR              Skin Changes/Observations    Location/Assessment Lower Extremity  -CW      Skin Observations Comment Skin red, dressings in place R lower leg. Skin dry. Bruise R foot from fall  -CW      Recorded by [CW] Radha Lane OTR              Lymphedema Sensation    Lymphedema Sensation Comments intact light touch  -CW      Recorded by [CW] Radha Lane OTR              Compression/Skin Care    Compression/Skin Care skin care;wrapping location;bandaging;compression garment;remove bandages  -CW      Compression Garment Comments Discussed skin care and compression garment wearing schedule  -CW      Recorded by [CW] Radha Lane OTR                User Key  (r) = Recorded By, (t) = Taken By, (c) = Cosigned By       Initials Name Effective Dates    CW Radha Lane, OTR 06/16/21 -                     OT ASSESSMENT FLOWSHEET (Last 12 Hours)       OT Evaluation and Treatment       Row Name 04/16/24 0800                   OT Time and Intention    Subjective Information complains of;pain  -CW        Document Type evaluation  -CW        Mode of Treatment occupational therapy  Lymph. clinic  -CW           General Information    Patient Profile Reviewed yes  -CW           Edema Assessment & Management    Additional Documentation Edema Symptoms/Interventions (Group);Location (Edema) (Row)  -CW           Edema Symptoms/Interventions    Description (Edema) pitting  -CW        Pitting Scale (Edema) 3-->moderate  -CW        Associated Symptoms (Edema) skin creases diminished;skin color changes;tissue elasticity loss;pain  -CW           Wound 04/14/24 2315 Right distal arm Avulsion    Wound - Properties Group Placement Date: 04/14/24  -KF Placement Time: 2315 -KF Present on Original Admission: Y  -KF Side: Right  -KF Orientation: distal  -KF Location: arm  -KF Primary Wound Type: Avulsion  -KF    Retired Wound - Properties Group Placement Date: 04/14/24  -KF Placement Time: 2315 -KF Present on Original Admission: Y  -KF Side: Right  -KF Orientation: distal  -KF Location: arm  -KF Primary Wound Type: Avulsion  -KF    Retired Wound - Properties Group Date first assessed: 04/14/24  -KF Time first assessed: 2315 -KF Present on Original Admission: Y  -KF Side: Right  -KF Location: arm  -KF Primary Wound Type: Avulsion  -KF       Wound 04/14/24 2315 Right posterior hand Avulsion    Wound - Properties Group Placement Date: 04/14/24  -KF Placement Time: 2315 -KF Present on Original Admission: Y  -KF Side: Right  -KF Orientation: posterior  -KF Location: hand  -KF Primary Wound Type: Avulsion  -KF    Retired Wound - Properties Group Placement Date: 04/14/24  -KF Placement Time: 2315 -KF Present on Original Admission: Y  -KF Side: Right  -KF  Orientation: posterior  -KF Location: hand  -KF Primary Wound Type: Avulsion  -KF    Retired Wound - Properties Group Date first assessed: 04/14/24  -KF Time first assessed: 2315  -KF Present on Original Admission: Y  -KF Side: Right  -KF Location: hand  -KF Primary Wound Type: Avulsion  -KF       Wound 09/30/20 0055 Right lower leg Abrasion    Wound - Properties Group Placement Date: 09/30/20  -VC Placement Time: 0055 -VC Present on Original Admission: Y  -VC Side: Right  -VC Orientation: lower  -VC Location: leg  -VC Primary Wound Type: Abrasion  -VC    Retired Wound - Properties Group Placement Date: 09/30/20  -VC Placement Time: 0055  -VC Present on Original Admission: Y  -VC Side: Right  -VC Orientation: lower  -VC Location: leg  -VC Primary Wound Type: Abrasion  -VC    Retired Wound - Properties Group Date first assessed: 09/30/20  -VC Time first assessed: 0055  -VC Present on Original Admission: Y  -VC Side: Right  -VC Location: leg  -VC Primary Wound Type: Abrasion  -VC       Plan of Care Review    Plan of Care Reviewed With patient  -CW        Outcome Evaluation OT/Lymphedema: Pt has received lymph. tx. in the past. Pt. presents with RLE mod increased edema /cellulitis, wound R LE (dressings in place) hx. fall. Edema is 3+ RLE and mild pitting 1+ LLE mostly foot to knee. Pt. has compression stockings, a compression pump, Tubigrip, ace wraps with gauze at home. Pt. lives in [facility] living facility and can receive assistance for compression wraps. Pt. reports HH will be following her for RLE wound. Discussed skin care, elevation and HEP. Recommend pt. to hold wearing her compression stockings  until her edema is reduced. Reviewed wearing schedule for stockings and tubigrip. Discussed velcro compression as an option. Pt. can call if she has any questions.  -CW           Positioning and Restraints    Pre-Treatment Position sitting in chair/recliner  -CW        Post Treatment Position chair  -CW        In Chair  sitting;call light within reach;encouraged to call for assist  -           Therapy Assessment/Plan (OT)    Therapy Frequency (OT) evaluation only  -           OT Goals    Precaution Goal Selection (OT) precaution, OT goal 1  -           Precaution Goal 1 (OT)    Activity (Precaution Goal 1, OT) --  Reviewed skin care, wearing schedule and precautions for stockings/compression  -                  User Key  (r) = Recorded By, (t) = Taken By, (c) = Cosigned By      Initials Name Effective Dates     Radha Lane OTR 06/16/21 -     VC Shu Macedo RN 08/14/20 - 03/10/21    Raine Saenz RN 04/12/23 -                      Occupational Therapy Education       Title: PT OT SLP Therapies (Done)       Topic: Occupational Therapy (Done)       Point: Precautions (Done)       Description:   Instruct learner(s) on prescribed precautions during self-care and functional transfers.                  Learning Progress Summary             Patient Acceptance, E, VU by  at 4/16/2024 0922    Comment: Wearing schedule for compression stockings/tubigrip, skin care, HEP                                         User Key       Initials Effective Dates Name Provider Type Discipline     06/16/21 -  Radha Lane OTR Occupational Therapist OT                      OT Recommendation and Plan  Therapy Frequency (OT): evaluation only  Plan of Care Review  Plan of Care Reviewed With: patient  Outcome Evaluation: OT/Lymphedema: Pt has received lymph. tx. in the past. Pt. presents with RLE mod increased edema /cellulitis, wound R LE (dressings in place) hx. fall. Edema is 3+ RLE and mild pitting 1+ LLE mostly foot to knee. Pt. has compression stockings, a compression pump, Tubigrip, ace wraps with gauze at home. Pt. lives in [facility] living facility and can receive assistance for compression wraps. Pt. reports HH will be following her for RLE wound. Discussed skin care, elevation and HEP. Recommend pt. to hold wearing her  compression stockings  until her edema is reduced. Reviewed wearing schedule for stockings and tubigrip. Discussed velcro compression as an option. Pt. can call if she has any questions.  Plan of Care Reviewed With: patient  Outcome Evaluation: OT/Lymphedema: Pt has received lymph. tx. in the past. Pt. presents with RLE mod increased edema /cellulitis, wound R LE (dressings in place) hx. fall. Edema is 3+ RLE and mild pitting 1+ LLE mostly foot to knee. Pt. has compression stockings, a compression pump, Tubigrip, ace wraps with gauze at home. Pt. lives in [facility] and can receive assistance for compression wraps. Pt. reports HH will be following her for RLE wound. Discussed skin care, elevation and HEP. Recommend pt. to hold wearing her compression stockings  until her edema is reduced. Reviewed wearing schedule for stockings and tubigrip. Discussed velcro compression as an option. Pt. can call if she has any questions.       Outcome Measures       Row Name 04/16/24 0900             How much help from another is currently needed...    Putting on and taking off regular lower body clothing? 3  -CW      Bathing (including washing, rinsing, and drying) 3  -CW      Toileting (which includes using toilet bed pan or urinal) 3  -CW      Putting on and taking off regular upper body clothing 4  -CW      Taking care of personal grooming (such as brushing teeth) 4  -CW      Eating meals 4  -CW      AM-PAC 6 Clicks Score (OT) 21  -CW         Functional Assessment    Outcome Measure Options AM-PAC 6 Clicks Daily Activity (OT)  -CW                User Key  (r) = Recorded By, (t) = Taken By, (c) = Cosigned By      Initials Name Provider Type    CW Radha Lane OTR Occupational Therapist                      Time Calculation:     Time Calculation- OT       Row Name 04/16/24 0924             Time Calculation- OT    OT Start Time 0739  -CW      OT Stop Time 0809  -CW      OT Time Calculation (min) 30 min  -CW       Total Timed Code Minutes- OT 15 minute(s)  -CW      OT Received On 04/16/24  -CW         Timed Charges    03049 - OT Self Care/Mgmt Minutes 15  -CW         Total Minutes    Timed Charges Total Minutes 15  -CW       Total Minutes 15  -CW                User Key  (r) = Recorded By, (t) = Taken By, (c) = Cosigned By      Initials Name Provider Type    CW Radha Lane, OTR Occupational Therapist                    Therapy Charges for Today       Code Description Service Date Service Provider Modifiers Qty    [id number] HC OT SELF CARE/MGMT/TRAIN EA 15 MIN 4/16/2024 Radha Lane OTR GO 1    [id number]  OT EVAL LOW COMPLEXITY 1 4/16/2024 Radha Lane OTR GO 1                        MAEVE Segovia  4/16/2024

## 2024-04-16 NOTE — DISCHARGE SUMMARY
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.1]  Yes    CLL (chronic lymphoid leukemia) in relapse [C91.12]  Yes    Morbid obesity [E66.01]  Yes    Current use of long term anticoagulation [Z79.01]  Not Applicable    Infection of prosthetic right knee joint [T84.53XA]  Not Applicable    Chronic combined systolic and diastolic congestive heart failure [I50.42]  Yes    HTN (hypertension) [I10]  Yes    CKD (chronic kidney disease), stage III [N18.30]  Yes    DM II (diabetes mellitus, type II), controlled [E11.9]  Yes    Paroxysmal atrial fibrillation [I48.0]  Yes    HLD (hyperlipidemia) [E78.5]  Yes    Coronary artery disease involving native coronary artery of native heart with angina pectoris [I25.119]  Yes      Resolved Hospital Problems   No resolved problems to display.        Hospital Course     89 y.o. female who presented to ER after mechanical fall and was found to have RLE wound/cellulitis that was not responding well to outpt therapy. Please see below for details of admission by problem:     Fall  Right hand skin tear  PT eval reassuring  No syncope/cardiac issue  Imaging negative for any fracture  WOCN has seen, continue wound care through [facility] after dc     RLE cellulitis/wound (Pseudomonas)  Chronic BLE edema  Appreciate ID attention to pt  Changed back to oral LQN--complete course after dc  Blood cultures NGTD  WOCN has seen, [facility] HH to follow after dc for assistance with wound care  OT lymphedema has seen and discussed outpt care with pt  No  fever, WBC falling     Right knee PJI  Not an issue at present per ID     Chronic combined CHF  Continued home meds  Appears euvolemic on exam (BLE edema is chronic)     CKD3a  Cr at or better than baseline     Hypokalemia  Replaced with protocol  Mg++ level fine     PAF  Chronic AC (Coumadin)  HRs fine, not on RC meds at home  Continued Coumadin, INR therapeutic     HTN  Not on meds at home  BPs fine here     DM2  OHGs on hold her but can restart at dc  Covered with SSI  A1c 7.4 in January     Nocturnal hypoxia  COPD  CHARLENE  Provided supplemental O2 as needed here      Day of Discharge     Subjective:  Feeling fine today. Eager to go home. Feels confident she can manage her edema well at home with the assistance of home health.     Physical Exam:  Temp:  [97.3 °F (36.3 °C)-97.9 °F (36.6 °C)] 97.3 °F (36.3 °C)  Heart Rate:  [80-82] 82  Resp:  [18] 18  BP: ()/(60-71) 98/63  Body mass index is 34.14 kg/m².  Physical Exam  Vitals and nursing note reviewed.   Constitutional:       General: She is not in acute distress.     Appearance: She is ill-appearing (chronically). She is not toxic-appearing or diaphoretic.   Cardiovascular:      Rate and Rhythm: Normal rate and regular rhythm.      Pulses: Normal pulses.   Pulmonary:      Effort: Pulmonary effort is normal. No respiratory distress.      Breath sounds: Normal breath sounds. No wheezing or rales.   Abdominal:      General: Bowel sounds are normal. There is no distension.      Palpations: Abdomen is soft.      Tenderness: There is no abdominal tenderness.   Musculoskeletal:         General: Swelling (chronic doughy edema of BLEs, R>L) present. Normal range of motion.      Cervical back: Neck supple.   Skin:     General: Skin is warm and dry.      Capillary Refill: Capillary refill takes less than 2 seconds.      Coloration: Skin is not jaundiced.      Comments: Skin tears right hand, covered with dressing  Wound right shin with surrounding erythema, no drainage,  no fluctuance, no streaking   Neurological:      General: No focal deficit present.      Mental Status: She is alert and oriented to person, place, and time. Mental status is at baseline.      Cranial Nerves: No cranial nerve deficit.      Coordination: Coordination normal.   Psychiatric:         Mood and Affect: Mood normal.         Behavior: Behavior normal.         Thought Content: Thought content normal.       Consultants     Consult Orders (all) (From admission, onward)       Start     Ordered    04/15/24 1053  Inpatient Pulmonology Consult  Once        Specialty:  Pulmonary Disease  Provider:  Juno Coburn MD    04/15/24 1053    04/15/24 1024  Inpatient Case Management  Consult  Once        Provider:  (Not yet assigned)    04/15/24 1023    04/15/24 0023  Inpatient Infectious Diseases Consult  Once        Specialty:  Infectious Diseases  Provider:  Mehul Miller MD    04/15/24 0024    04/14/24 2237  [facility] (on-call MD unless specified) Details  Once        Specialty:  Hospitalist  Provider:  (Not yet assigned)    04/14/24 2236                  Procedures     * Surgery not found *    Imaging Results (All)       Procedure Component Value Units Date/Time    CT Cervical Spine Without Contrast [[id number]] Collected: 04/14/24 2209     Updated: 04/14/24 2219    Narrative:      CT OF THE CERVICAL SPINE     HISTORY: Fall and neck pain     COMPARISON: None available.     TECHNIQUE: Axial CT imaging was obtained through the cervical spine.  Coronal and sagittal reformatted images were obtained.     FINDINGS:  No acute fracture or subluxation of the cervical spine is seen. There is  some minimal anterolisthesis of C3 on C4. Further anterolisthesis is  noted at C6-C7. Intervertebral disc space narrowing is most significant  at C4-C5 and C5-C6. There is no prevertebral soft tissue swelling.  Thyroid gland is heterogeneous. Images through the lung apices  demonstrate some right apical scarring.  There is some mild canal  stenosis at multiple levels. Neural foraminal narrowing is most  significant at C4-C5 on the left.          Impression:      No acute fracture or subluxation identified.     Radiation dose reduction techniques were utilized, including automated  exposure control and exposure modulation based on body size.        This report was finalized on 4/14/2024 10:16 PM by Dr. Heidi Garza M.D on Workstation: [id number]       CT Head Without Contrast [[id number]] Collected: 04/14/24 2206     Updated: 04/14/24 2212    Narrative:      CT OF THE HEAD WITHOUT CONTRAST     HISTORY: Fall     COMPARISON: None available.     TECHNIQUE: Axial CT imaging was obtained through the brain. No IV  contrast was administered.     FINDINGS:  No acute intracranial hemorrhage is seen. There is diffuse atrophy.  There is periventricular and deep white matter microangiopathic change.  There is no midline shift or mass effect. No calvarial fracture is seen.  There is some mucosal thickening within the ethmoid sinuses. Mucous  retention cyst is noted within the right maxillary sinus. Mastoid air  cells appear clear.       Impression:      No acute intracranial findings.     Radiation dose reduction techniques were utilized, including automated  exposure control and exposure modulation based on body size.        This report was finalized on 4/14/2024 10:09 PM by Dr. Heidi Garza M.D on Workstation: [id number]       XR Tibia Fibula 2 View Right [[id number]] Collected: 04/14/24 2156     Updated: 04/14/24 2201    Narrative:      2 VIEWS RIGHT KNEE; 2 VIEWS RIGHT TIBIA AND FIBULA     HISTORY: Fall     COMPARISON: January 14, 2018     FINDINGS:  The patient is status post right knee arthroplasty. The hardware is  stable in position compared to prior study. No acute fracture or  subluxation of the right knee or the right tibia or fibula is seen. No  large suprapatellar effusion is seen. Patient does have right  lower  extremity edema. This is particularly notable overlying the dorsum of  the foot. Dense vascular calcifications are noted.       Impression:      No acute fracture or subluxation identified.     This report was finalized on 4/14/2024 9:58 PM by Dr. Heidi Garza M.D on Workstation: [id number]       XR Knee 1 or 2 View Right [[id number]] Collected: 04/14/24 2156     Updated: 04/14/24 2201    Narrative:      2 VIEWS RIGHT KNEE; 2 VIEWS RIGHT TIBIA AND FIBULA     HISTORY: Fall     COMPARISON: January 14, 2018     FINDINGS:  The patient is status post right knee arthroplasty. The hardware is  stable in position compared to prior study. No acute fracture or  subluxation of the right knee or the right tibia or fibula is seen. No  large suprapatellar effusion is seen. Patient does have right lower  extremity edema. This is particularly notable overlying the dorsum of  the foot. Dense vascular calcifications are noted.       Impression:      No acute fracture or subluxation identified.     This report was finalized on 4/14/2024 9:58 PM by Dr. Heidi Garza M.D on Workstation: [id number]       XR Hand 3+ View Right [[id number]] Collected: 04/14/24 2148     Updated: 04/14/24 2156    Narrative:      3 VIEWS RIGHT HAND; 3 VIEWS RIGHT WRIST     HISTORY: Right wrist and hand pain     COMPARISON: None available.     FINDINGS:  Right hand: No acute fracture or subluxation of the right hand is seen.  There are advanced degenerative changes of the hands, most significant  involving the interphalangeal joint of the thumb.     Right wrist: There are advanced degenerative changes noted throughout  the right wrist, as well as at the carpometacarpal joint of the thumb.  No definite acute fracture or subluxation is seen. There are dense  vascular calcifications.       Impression:      No acute fracture or subluxation identified.     This report was finalized on 4/14/2024 9:53 PM by Dr. Heidi Garza M.D on  Workstation: [id number]       XR Wrist 3+ View Right [[id number]] Collected: 04/14/24 2148     Updated: 04/14/24 2156    Narrative:      3 VIEWS RIGHT HAND; 3 VIEWS RIGHT WRIST     HISTORY: Right wrist and hand pain     COMPARISON: None available.     FINDINGS:  Right hand: No acute fracture or subluxation of the right hand is seen.  There are advanced degenerative changes of the hands, most significant  involving the interphalangeal joint of the thumb.     Right wrist: There are advanced degenerative changes noted throughout  the right wrist, as well as at the carpometacarpal joint of the thumb.  No definite acute fracture or subluxation is seen. There are dense  vascular calcifications.       Impression:      No acute fracture or subluxation identified.     This report was finalized on 4/14/2024 9:53 PM by Dr. Heidi Garza M.D on Workstation: [id number]       XR Spine Lumbar Complete 4+VW [[id number]] Collected: 04/14/24 2134     Updated: 04/14/24 2142    Narrative:      4 VIEWS LUMBAR SPINE     HISTORY: Fall     COMPARISON: October 13, 2020     FINDINGS:  No acute fracture or subluxation of the lumbar spine is seen. There is  anterolisthesis of L5 on S1. There is retrolisthesis of L3 on L4, and  severe intervertebral disc space narrowing is noted at multiple levels.  Dense calcification of the aorta is noted. There is some minimal  anterolisthesis of L4 on L5.    Impression:      No acute fracture or subluxation identified.     This report was finalized on 4/14/2024 9:39 PM by Dr. Heidi Garza M.D on Workstation: [id number]             Results for orders placed during the hospital encounter of 10/06/23    Doppler Ankle Brachial Index Single Level CAR    Interpretation Summary    Right Conclusion: The right ARSENIO is unable to be assessed due to vessel incompressibility. Normal digital pressures.    Left Conclusion: The left ARSENIO is unable to be assessed due to vessel incompressibility. Normal digital  pressures.    Waveforms suggest normal lower extremity perfusion.    Results for orders placed during the hospital encounter of 05/22/18    Adult Transthoracic Echo Complete W/ Cont if Necessary Per Protocol    Interpretation Summary  · Left ventricular systolic function is moderately decreased. Calculated EF = 49%. Estimated EF was in disagreement with the calculated EF. Estimated EF appears to be in the range of 36 - 40%. Normal left ventricular wall thickness noted.  · The following segments are akinetic: mid inferior, apical septal, apical inferior, apical lateral and apex. The following segments are hypokinetic: mid inferoseptal and mid inferolateral.  · The left ventricular cavity is mildly dilated.  · Left ventricular diastolic dysfunction is noted (grade II w/high LAP) consistent with pseudonormalization.  · Electronic lead present in the ventricle.  · Left atrial volume is moderately increased.  · There is moderate calcification of the aortic valve.  · Moderate MAC is present.  · Mild-to-moderate mitral valve regurgitation is present.  · Mild tricuspid valve regurgitation is present. Estimated right ventricular systolic pressure from tricuspid regurgitation is normal (<35 mmHg).    Pertinent Labs     Results from last 7 days   Lab Units 04/15/24  0504 04/14/24  2155   WBC 10*3/mm3 11.86* 14.35*   HEMOGLOBIN g/dL 11.8* 12.7   PLATELETS 10*3/mm3 123* 138*     Results from last 7 days   Lab Units 04/15/24  2055 04/15/24  0504 04/14/24  2155   SODIUM mmol/L  --  141 145   POTASSIUM mmol/L 4.4 3.1* 3.9   CHLORIDE mmol/L  --  108* 109*   CO2 mmol/L  --  20.9* 27.0   BUN mg/dL  --  22 23   CREATININE mg/dL  --  0.85 1.13*   GLUCOSE mg/dL  --  94 158*   EGFR mL/min/1.73  --  65.6 46.6*     Results from last 7 days   Lab Units 04/14/24  2155   ALBUMIN g/dL 3.9   BILIRUBIN mg/dL 0.6   ALK PHOS U/L 109   AST (SGOT) U/L 42*   ALT (SGPT) U/L 30     Results from last 7 days   Lab Units 04/16/24  0529 04/15/24  0504  "04/14/24 2155   CALCIUM mg/dL  --  8.0* 8.4*   ALBUMIN g/dL  --   --  3.9   MAGNESIUM mg/dL 1.8 1.8  --                Invalid input(s): \"LDLCALC\"  Results from last 7 days   Lab Units 04/14/24 2309 04/14/24  2259   BLOODCX  No growth at 24 hours No growth at 24 hours         Test Results Pending at Discharge     Pending Labs       Order Current Status    Blood Culture - Blood, Arm, Right Preliminary result    Blood Culture - Blood, Wrist, Left Preliminary result            Discharge Details        Discharge Medications        Continue These Medications        Instructions Start Date   acetaminophen 325 MG tablet  Commonly known as: TYLENOL   650 mg, Oral, Every 4 Hours PRN      albuterol (2.5 MG/3ML) 0.083% nebulizer solution  Commonly known as: PROVENTIL   2.5 mg, Nebulization, Every 4 Hours      albuterol sulfate  (90 Base) MCG/ACT inhaler  Commonly known as: PROVENTIL HFA;VENTOLIN HFA;PROAIR HFA   2 puffs, Inhalation, Every 4 Hours PRN      budesonide 0.5 MG/2ML nebulizer solution  Commonly known as: PULMICORT   0.5 mg, Nebulization, 2 Times Daily      calcium-vitamin D 500-200 MG-UNIT tablet per tablet   1 tablet, Oral      carvedilol 3.125 MG tablet  Commonly known as: COREG   3.125 mg, Oral, 2 Times Daily With Meals      cephalexin 500 MG capsule  Commonly known as: KEFLEX   500 mg, Oral, 3 Times Daily      Cetirizine HCl 10 MG capsule   Oral      FASENRA SC   Subcutaneous, Gets one shot a month, next shot may 13 then one every 8 weeks       fluticasone 50 MCG/ACT nasal spray  Commonly known as: FLONASE   1 spray, Nasal, Daily      furosemide 20 MG tablet  Commonly known as: LASIX   20 mg, Oral, As Needed      glipizide 5 MG tablet  Commonly known as: GLUCOTROL   5 mg, Oral, Take one half tablet by mouth daily      levoFLOXacin 500 MG tablet  Commonly known as: LEVAQUIN   500 mg, Oral, Daily      melatonin 5 MG tablet tablet   5 mg, Oral, Nightly      metFORMIN  MG 24 hr tablet  Commonly known " as: GLUCOPHAGE-XR   No dose, route, or frequency recorded.      montelukast 10 MG tablet  Commonly known as: SINGULAIR   10 mg, Oral, Nightly      oxybutynin XL 10 MG 24 hr tablet  Commonly known as: DITROPAN-XL   10 mg, Oral, 2 Times Daily      polyethyl glycol-propyl glycol 0.4-0.3 % solution ophthalmic solution (artificial tears)  Commonly known as: SYSTANE   1 drop, Both Eyes, Every 1 Hour PRN      rosuvastatin 20 MG tablet  Commonly known as: CRESTOR   20 mg, Oral, Nightly      warfarin 4 MG tablet  Commonly known as: COUMADIN   TAKE ONE-HALF (1/2) TABLET ON SUNDAY AND FRIDAY AND TAKE ONE TABLET ALL OTHER DAYS OR AS DIRECTED      Yupelri 175 MCG/3ML nebulizer solution  Generic drug: revefenacin   Nebulization, Daily - RT             Stop These Medications      Budesonide 3 MG 24 hr capsule  Commonly known as: ENTOCORT EC     Imodium Multi-Symptom Relief 2-125 MG tablet  Generic drug: Loperamide-Simethicone     IMODIUM PO     ketoconazole 2 % cream  Commonly known as: NIZORAL     mupirocin 2 % cream  Commonly known as: BACTROBAN     nystatin 027904 UNIT/GM cream  Commonly known as: MYCOSTATIN              Allergies   Allergen Reactions    Accupril [Quinapril Hcl] Swelling, Other (See Comments), GI Intolerance and Delirium     HA, constipation     Ahist [Chlorpheniramine] Nausea Only, Other (See Comments) and Dizziness     Headache, Blurred vision    Clarithromycin Nausea Only, Other (See Comments) and Mental Status Change     HA, Depression, Flushing    Esomeprazole GI Intolerance    Latex Other (See Comments)     Skin breakdown    Levalbuterol Swelling    Levocetirizine Diarrhea and GI Intolerance    Lipitor [Atorvastatin] Other (See Comments) and Myalgia     Dark urine    Omeprazole Nausea Only and Other (See Comments)     HA    Pravachol [Pravastatin] Nausea Only and GI Intolerance     Bloated, Constipation, HA    Sulindac Other (See Comments) and Myalgia     HA, joint pain, bruising    Valdecoxib  Irritability    Chlorcyclizine Unknown - Low Severity    Diclofenac Sodium Unknown - Low Severity    Diphenhydramine Unknown - Low Severity    Lodine [Etodolac] Unknown - Low Severity    Sulfa Antibiotics Unknown - Low Severity       Discharge Disposition:  Home-Health Care Svc      Discharge Diet:  Diet Order   Procedures    Diet: Cardiac; Healthy Heart (2-3 Na+); Fluid Consistency: Thin (IDDSI 0)       Discharge Activity:   As tolerated    CODE STATUS:    Code Status and Medical Interventions:   Ordered at: 04/15/24 0024     Code Status (Patient has no pulse and is not breathing):    CPR (Attempt to Resuscitate)     Medical Interventions (Patient has pulse or is breathing):    Full Support       Future Appointments   Date Time Provider Department Center   7/24/2024 10:00 AM [facility] [place] DEVICE CHECK [facility] CD [id number] None   7/24/2024 10:30 AM Jonah Regalado Jr., MD [facility] CD [facility]   8/29/2024  9:00 AM LAB CHAIR 1 [facility] LUIS FERNANDO  LAB [facility] [place]   8/29/2024  9:20 AM Lucien Larkin MD [facility] CBC [facility] [place]   8/29/2024  9:45 AM CHAIR 7B [facility] ANA MARIA Roland MD  INFUS [facility]   2/20/2025  9:30 AM [facility] OP VAS RM 1 [facility] OVKR [facility]   2/20/2025 10:00 AM Patricia Steve APRN [facility] VS [facility]   4/11/2025  1:50 PM Mehul Miller MD [facility] ID [facility]     Additional Instructions for the Follow-ups that You Need to Schedule       Ambulatory Referral to Home Health   As directed      Face to Face Visit Date: 4/16/2024   Follow-up provider for Plan of Care?: I treated the patient in an acute care facility and will not continue treatment after discharge.   Follow-up provider: LEIGH WALKER [[id number]]   Reason/Clinical Findings: RLE wound with cellulitis, chronic BLE lymphedema, DM2   Describe mobility limitations that make leaving home difficult: Requires the assistance of another to leave the home   Nursing/Therapeutic Services Requested: Skilled Nursing   Skilled nursing orders: Medication education Wound  care dressing/changes   Frequency: 1 Week 1        Discharge Follow-up with PCP   As directed       Currently Documented PCP:    Zenaida Walker MD    PCP Phone Number:    [phone]     Follow Up Details: Dr. Walker (PCP) in 1 week        Discharge Follow-up with Specified Provider: Dr. Miller (ID) as needed   As directed      To: Dr. Miller (ID) as needed               Follow-up Information       Zenaida Walker MD .    Specialty: Internal Medicine  Why: Dr. Walker (PCP) in 1 week  Contact information:  [address] [facility] [address]  [phone]                             Additional Instructions for the Follow-ups that You Need to Schedule       Ambulatory Referral to Home Health   As directed      Face to Face Visit Date: 4/16/2024   Follow-up provider for Plan of Care?: I treated the patient in an acute care facility and will not continue treatment after discharge.   Follow-up provider: ZENAIDA WALKER [[id number]]   Reason/Clinical Findings: RLE wound with cellulitis, chronic BLE lymphedema, DM2   Describe mobility limitations that make leaving home difficult: Requires the assistance of another to leave the home   Nursing/Therapeutic Services Requested: Skilled Nursing   Skilled nursing orders: Medication education Wound care dressing/changes   Frequency: 1 Week 1        Discharge Follow-up with PCP   As directed       Currently Documented PCP:    Zenaida Walker MD    PCP Phone Number:    [phone]     Follow Up Details: Dr. Walker (PCP) in 1 week        Discharge Follow-up with Specified Provider: Dr. Miller (ID) as needed   As directed      To: Dr. Miller (ID) as needed            Time Spent on Discharge:  Greater than 30 minutes      Jonah Lanier MD  [facility]  04/16/24  11:04 EDT

## 2024-04-16 NOTE — DISCHARGE PLACEMENT REQUEST
"Delfin Longoria (89 y.o. Female)       Date of Birth   1934    Social Security Number       Address   [address] [place] [address]    Home Phone   [phone]    MRN   [id number]       Nondenominational   Christian    Marital Status   Single                            Admission Date   4/14/24    Admission Type   Emergency    Admitting Provider   Jonah Lanier MD    Attending Provider   Jonah Lanier MD    Department, Room/Bed   [address], [address]/1       Discharge Date       Discharge Disposition   Home-Health Care [facility]    Discharge Destination                                 Attending Provider: Jonah Lanier MD    Allergies: Accupril [Quinapril Hcl], Ahist [Chlorpheniramine], Clarithromycin, Esomeprazole, Latex, Levalbuterol, Levocetirizine, Lipitor [Atorvastatin], Omeprazole, Pravachol [Pravastatin], Sulindac, Valdecoxib, Chlorcyclizine, Diclofenac Sodium, Diphenhydramine, Lodine [Etodolac], Sulfa Antibiotics    Isolation: None   Infection: None   Code Status: CPR    Ht: 160 cm (63\")   Wt: 87.4 kg (192 lb 11.2 oz)    Admission Cmt: None   Principal Problem: Cellulitis of right lower extremity [L03.115]                   Active Insurance as of 4/14/2024       Primary Coverage       Payor Plan Insurance Group Employer/Plan Group    MEDICARE MEDICARE A & B        Payor Plan Address Payor Plan Phone Number Payor Plan Fax Number Effective Dates    [address] [phone]  10/1/1999 - None Entered    [facility] [id number]         Subscriber Name Subscriber Birth Date Member ID       DELFIN LONGORIA 1934 [id number]               Secondary Coverage       Payor Plan Insurance Group Employer/Plan Group    [employer] [facility] HEALTH CARE OPTIONS PLAN F       Payor Plan Address Payor Plan Phone Number Payor Plan Fax Number Effective Dates    [facility] [phone]  1/1/2015 - None Entered    [address]       [facility] [address]         Subscriber Name " Subscriber Birth Date Member ID       DELFIN LONGORIA 1934 [id number]                     Emergency Contacts        (Rel.) Home Phone Work Phone Mobile Phone    Trae Longoria ([employer]CS) (Brother) [phone] -- [phone]    Zoey Adair (HCS) (Relative) [phone] -- [phone]    Inez Longoria (Relative) [phone] -- [phone]

## 2024-04-17 ENCOUNTER — TELEPHONE (OUTPATIENT)
Dept: PHARMACY | Facility: HOSPITAL | Age: 89
End: 2024-04-17
Payer: MEDICARE

## 2024-04-17 NOTE — OUTREACH NOTE
Prep Survey      Flowsheet Row Responses   [facility]   Is LACE score < 7 ? No   Eligibility Readm Mgmt   Discharge diagnosis Cellulitis of right lower extremity   Does the patient have one of the following disease processes/diagnoses(primary or secondary)? Other   Does the patient have Home health ordered? Yes   What is the Home health agency?  [facility]   Is there a DME ordered? Yes   What DME was ordered? [place] for o2-pending   Prep survey completed? Yes            DAVID SOMERS - Registered Nurse

## 2024-04-17 NOTE — TELEPHONE ENCOUNTER
----- Message from Carol Perea, Pharmacy Technician sent at 4/17/2024  8:38 AM EDT -----  Regarding: Hospital admission  Patient called to let us know that she was in the hospital 4/14-4/16 due to a fall. She was also being treated for cellulitis of her leg and has 3 days of Levofloxacin remaining.    Recommended for the patient to have her INR checked by Friday

## 2024-04-18 ENCOUNTER — TELEPHONE (OUTPATIENT)
Age: 89
End: 2024-04-18
Payer: MEDICARE

## 2024-04-18 NOTE — TELEPHONE ENCOUNTER
----- Message from Yun Johnson MD sent at 4/18/2024  9:15 AM EDT -----  Please let patient know that their final x-ray read by radiologist concur that it was negative and did not show nay fracture. If pain persists patient can follow up with PCP or ER for CT scan.

## 2024-04-19 ENCOUNTER — TELEPHONE (OUTPATIENT)
Dept: INFECTIOUS DISEASES | Facility: CLINIC | Age: 89
End: 2024-04-19
Payer: MEDICARE

## 2024-04-19 ENCOUNTER — ANTICOAGULATION VISIT (OUTPATIENT)
Dept: PHARMACY | Facility: HOSPITAL | Age: 89
End: 2024-04-19
Payer: MEDICARE

## 2024-04-19 DIAGNOSIS — I48.0 PAROXYSMAL ATRIAL FIBRILLATION: Primary | ICD-10-CM

## 2024-04-19 LAB — INR PPP: 3.3

## 2024-04-19 NOTE — PROGRESS NOTES
Anticoagulation Clinic Progress Note    Anticoagulation Summary  As of 4/19/2024      INR goal:  2.0-3.0   TTR:  68.3% (5.4 y)   INR used for dosing:  3.30 (4/19/2024)   Warfarin maintenance plan:  2 mg every Sun, Fri; 4 mg all other days   Weekly warfarin total:  24 mg   Plan last modified:  Petra Oliveira RPH (8/14/2023)   Next INR check:  5/3/2024   Priority:  Maintenance   Target end date:  Indefinite    Indications    Paroxysmal atrial fibrillation [I48.0]                 Anticoagulation Episode Summary       INR check location:      Preferred lab:      Send INR reminders to:  MAGALIS SINCLAIR  POOL    Comments:  [facility] beginning 4/22/20          Anticoagulation Care Providers       Provider Role Specialty Phone number    Jonah Regalado Jr., MD Referring Cardiology [phone]            Clinic Interview:  Patient Findings     Positives:  Hospital admission    Negatives:  Signs/symptoms of thrombosis, Signs/symptoms of bleeding,   Laboratory test error suspected, Change in health, Change in alcohol use,   Change in activity, Upcoming invasive procedure, Emergency department   visit, Upcoming dental procedure, Missed doses, Extra doses, Change in   medications, Change in diet/appetite, Bruising, Other complaints      Clinical Outcomes     Negatives:  Major bleeding event, Thromboembolic event,   Anticoagulation-related hospital admission, Anticoagulation-related ED   visit, Anticoagulation-related fatality        INR History:      3/15/2024     9:50 AM 3/30/2024     8:41 PM 4/14/2024     9:55 PM 4/15/2024     5:04 AM 4/16/2024     5:29 AM 4/19/2024    12:00 AM 4/19/2024    10:44 AM   Anticoagulation Monitoring   INR 2.10      3.30   INR Date 3/15/2024      4/19/2024   INR Goal 2.0-3.0      2.0-3.0   Trend Same      Same   Last Week Total 24 mg      22 mg   Next Week Total 24 mg      22 mg   Sun 2 mg      2 mg   Mon 4 mg      4 mg   Tue 4 mg      4 mg   Wed 4 mg      4 mg   Thu 4 mg      4  mg   Fri 2 mg      Hold (4/19); Otherwise 2 mg   Sat 4 mg      4 mg   Historical INR  2.28  2.33  2.58  2.50  3.30            This result is from an external source.       Plan:  1. INR is Supratherapeutic today- see above in Anticoagulation Summary.   Will instruct Caitlyn TUCKER Checomaxx to Change their warfarin regimen- see above in Anticoagulation Summary.  Hospitalized 4/14-4/16 for cellulitis, finished abx today, hold then resume, rck 2 weeks   2. Follow up in 2 weeks  3. They have been instructed to call if any changes in medications, doses, concerns, etc. Patient expresses understanding and has no further questions at this time.    Petra Oliveira [facility]

## 2024-04-19 NOTE — TELEPHONE ENCOUNTER
PT's Home Health is requesting another order of LEVAQUIN to be sent due to wound growth and cellulitis.

## 2024-04-20 LAB
BACTERIA SPEC AEROBE CULT: NORMAL
BACTERIA SPEC AEROBE CULT: NORMAL

## 2024-04-22 ENCOUNTER — TELEPHONE (OUTPATIENT)
Dept: INFECTIOUS DISEASES | Facility: CLINIC | Age: 89
End: 2024-04-22
Payer: MEDICARE

## 2024-04-22 NOTE — TELEPHONE ENCOUNTER
After receiving Dr. Kraus reply that he does not recommend ordering additional Levaquin for patient, I called [facility] and left voice message for the nurse manager Benny that MD does not recommend any more of the Levaquin.

## 2024-04-22 NOTE — TELEPHONE ENCOUNTER
Voice message left for [facility] RN manager Benny to return call regarding a message from the home health that had been sent to Dr. Miller & myself on 4/19/24 ( I was off on 4/19/24) about having MD call in another Rx for Levaquin due to wound growth & cellulitis and more clarity was needed. In review of chart, patient had been D/c'd from hospital 4/16/24 & Dr. Miller had indicated in his note that day that patient would be on Levaquin til 4/18/24 and I did not see any wound cultures pending at that time and I did not see that Dr. Miller had sent a reply or made recommendations.

## 2024-04-25 ENCOUNTER — READMISSION MANAGEMENT (OUTPATIENT)
Dept: CALL CENTER | Facility: HOSPITAL | Age: 89
End: 2024-04-25
Payer: MEDICARE

## 2024-04-25 NOTE — OUTREACH NOTE
Medical Week 2 Survey      Flowsheet Row Responses   [facility] patient discharged from? [place]   Does the patient have one of the following disease processes/diagnoses(primary or secondary)? Other   Week 2 attempt successful? No   Unsuccessful attempts Attempt 1            Marleen LAGOS - Registered Nurse

## 2024-04-29 ENCOUNTER — READMISSION MANAGEMENT (OUTPATIENT)
Dept: CALL CENTER | Facility: HOSPITAL | Age: 89
End: 2024-04-29
Payer: MEDICARE

## 2024-04-29 ENCOUNTER — OFFICE VISIT (OUTPATIENT)
Dept: WOUND CARE | Facility: HOSPITAL | Age: 89
End: 2024-04-29
Payer: MEDICARE

## 2024-04-29 PROCEDURE — G0463 HOSPITAL OUTPT CLINIC VISIT: HCPCS

## 2024-04-29 NOTE — OUTREACH NOTE
Medical Week 2 Survey      Flowsheet Row Responses   [facility] patient discharged from? [place]   Does the patient have one of the following disease processes/diagnoses(primary or secondary)? Other   Week 2 attempt successful? Yes   Call start time 1026   Discharge diagnosis Cellulitis of right lower extremity   Call end time 1031   Person spoke with today (if not patient) and relationship Patient   Meds reviewed with patient/caregiver? Yes   Is the patient having any side effects they believe may be caused by any medication additions or changes? No   Does the patient have all medications ordered at discharge? N/A   Is the patient taking all medications as directed (includes completed medication regime)? Yes   Comments regarding appointments [facility] NEW patient appt today, 4/29/24 at 12:30 PM with Dr. Jose Daniel Richardson.   Does the patient have a primary care provider?  Yes   Comments regarding PCP PCP, Dr. Zenaida Suarez---pt states she saw PCP last week for f/u.   Has the patient kept scheduled appointments due by today? Yes   What is the Home health agency?  [facility]   Has home health visited the patient within 72 hours of discharge? Yes   Home health comments Pt reports HH visiting.   What DME was ordered? No oxygen needed at home per patient   Psychosocial issues? No   Comments Patient states she is doing pretty good.  is visiting and assisting her. She has a f/u appt with wound care doctor today. She has not looked at the leg for a few days, but the  staff has per patient. Pt resides at [facility].   Did the patient receive a copy of their discharge instructions? Yes   Nursing interventions Reviewed instructions with patient   What is the patient's perception of their health status since discharge? Improving   Is the patient/caregiver able to teach back signs and symptoms related to disease process for when to call PCP? Yes   Is the patient/caregiver able to teach back signs and symptoms  related to disease process for when to call 911? Yes   Is the patient/caregiver able to teach back the hierarchy of who to call/visit for symptoms/problems? PCP, Specialist, Home health nurse, Urgent Care, ED, 911 Yes   If the patient is a current smoker, are they able to teach back resources for cessation? Not a smoker   Week 2 Call Completed? Yes   Graduated Yes   Is the patient interested in additional calls from an ambulatory ? No   Would this patient benefit from a Referral to Amb Social Work? No   Graduated/Revoked comments Pt denies any needs or concerns. No questions.   Call end time 1031            Ileana LYON - Registered Nurse

## 2024-05-01 ENCOUNTER — TRANSCRIBE ORDERS (OUTPATIENT)
Dept: ADMINISTRATIVE | Facility: HOSPITAL | Age: 89
End: 2024-05-01
Payer: MEDICARE

## 2024-05-01 DIAGNOSIS — I70.238 ATHSCL NATV ART OF RIGHT LEG W ULCER OTH PRT LOWER RIGHT LEG: Primary | ICD-10-CM

## 2024-05-01 RX ORDER — WARFARIN SODIUM 4 MG/1
TABLET ORAL
Qty: 80 TABLET | Refills: 1 | Status: SHIPPED | OUTPATIENT
Start: 2024-05-01

## 2024-05-06 ENCOUNTER — OFFICE VISIT (OUTPATIENT)
Dept: WOUND CARE | Facility: HOSPITAL | Age: 89
End: 2024-05-06
Payer: MEDICARE

## 2024-05-15 LAB — INR PPP: 3.4

## 2024-05-16 ENCOUNTER — ANTICOAGULATION VISIT (OUTPATIENT)
Dept: PHARMACY | Facility: HOSPITAL | Age: 89
End: 2024-05-16
Payer: MEDICARE

## 2024-05-16 DIAGNOSIS — I48.0 PAROXYSMAL ATRIAL FIBRILLATION: Primary | ICD-10-CM

## 2024-05-16 NOTE — PROGRESS NOTES
Anticoagulation Clinic Progress Note    Anticoagulation Summary  As of 5/16/2024      INR goal:  2.0-3.0   TTR:  67.4% (5.4 y)   INR used for dosing:  3.40 (5/15/2024)   Warfarin maintenance plan:  2 mg every Sun, Tue, Thu; 4 mg all other days   Weekly warfarin total:  22 mg   Plan last modified:  Jaylin Nick, PharmD (5/16/2024)   Next INR check:  5/29/2024   Priority:  Maintenance   Target end date:  Indefinite    Indications    Paroxysmal atrial fibrillation [I48.0]                 Anticoagulation Episode Summary       INR check location:      Preferred lab:      Send INR reminders to:  MAGALIS SINCLAIR  POOL    Comments:  [facility] beginning 4/22/20          Anticoagulation Care Providers       Provider Role Specialty Phone number    Jonah Regalado Jr., MD Referring Cardiology [phone]            Clinic Interview:  Patient Findings     Negatives:  Signs/symptoms of thrombosis, Signs/symptoms of bleeding,   Laboratory test error suspected, Change in health, Change in alcohol use,   Change in activity, Upcoming invasive procedure, Emergency department   visit, Upcoming dental procedure, Missed doses, Extra doses, Change in   medications, Change in diet/appetite, Hospital admission, Bruising, Other   complaints      Clinical Outcomes     Negatives:  Major bleeding event, Thromboembolic event,   Anticoagulation-related hospital admission, Anticoagulation-related ED   visit, Anticoagulation-related fatality        INR History:      4/14/2024     9:55 PM 4/15/2024     5:04 AM 4/16/2024     5:29 AM 4/19/2024    12:00 AM 4/19/2024    10:44 AM 5/15/2024    12:00 AM 5/16/2024     9:16 AM   Anticoagulation Monitoring   INR     3.30  3.40   INR Date     4/19/2024  5/15/2024   INR Goal     2.0-3.0  2.0-3.0   Trend     Same  Down   Last Week Total     22 mg  24 mg   Next Week Total     22 mg  20 mg   Sun     2 mg  2 mg   Mon     4 mg  4 mg   Tue     4 mg  2 mg   Wed     4 mg  4 mg   Thu     4 mg  2 mg  (5/16, 5/23)   Fri     Hold (4/19); Otherwise 2 mg  2 mg (5/17); Otherwise 4 mg   Sat     4 mg  4 mg   Historical INR 2.33  2.58  2.50  3.30      3.40            This result is from an external source.       Plan:  1. INR is Supratherapeutic today- see above in Anticoagulation Summary.   Will instruct Caitlyn Longoria to Change their warfarin regimen- see above in Anticoagulation Summary.  Take 2 mg today and tomorrow then reduce the weekly dose to : 2 mg Sunday, Tuesday, Thursday and 4 mg on all other days.   2. Follow up in 2 weeks  3. They have been instructed to call if any changes in medications, doses, concerns, etc. Patient expresses understanding and has no further questions at this time.    Jaylin Nick, PharmD

## 2024-05-17 ENCOUNTER — HOSPITAL ENCOUNTER (OUTPATIENT)
Dept: CARDIOLOGY | Facility: HOSPITAL | Age: 89
Discharge: HOME | End: 2024-05-17
Payer: MEDICARE

## 2024-05-17 DIAGNOSIS — I70.238 ATHSCL NATV ART OF RIGHT LEG W ULCER OTH PRT LOWER RIGHT LEG: ICD-10-CM

## 2024-05-17 LAB
BH CV LOWER ARTERIAL LEFT ABI RATIO: NORMAL
BH CV LOWER ARTERIAL LEFT DORSALIS PEDIS SYS MAX: NORMAL
BH CV LOWER ARTERIAL LEFT GREAT TOE SYS MAX: 97
BH CV LOWER ARTERIAL LEFT POST TIBIAL SYS MAX: NORMAL
BH CV LOWER ARTERIAL LEFT TBI RATIO: 0.7
BH CV LOWER ARTERIAL RIGHT ABI RATIO: NORMAL
BH CV LOWER ARTERIAL RIGHT DORSALIS PEDIS SYS MAX: NORMAL
BH CV LOWER ARTERIAL RIGHT GREAT TOE SYS MAX: 107
BH CV LOWER ARTERIAL RIGHT POST TIBIAL SYS MAX: NORMAL
BH CV LOWER ARTERIAL RIGHT TBI RATIO: 0.78
UPPER ARTERIAL LEFT ARM BRACHIAL SYS MAX: 136
UPPER ARTERIAL RIGHT ARM BRACHIAL SYS MAX: 138

## 2024-05-17 PROCEDURE — 93922 UPR/L XTREMITY ART 2 LEVELS: CPT

## 2024-05-20 ENCOUNTER — OFFICE VISIT (OUTPATIENT)
Dept: WOUND CARE | Facility: HOSPITAL | Age: 89
End: 2024-05-20
Payer: MEDICARE

## 2024-06-03 ENCOUNTER — ANTICOAGULATION VISIT (OUTPATIENT)
Dept: PHARMACY | Facility: HOSPITAL | Age: 89
End: 2024-06-03
Payer: MEDICARE

## 2024-06-03 ENCOUNTER — OFFICE VISIT (OUTPATIENT)
Dept: WOUND CARE | Facility: HOSPITAL | Age: 89
End: 2024-06-03
Payer: MEDICARE

## 2024-06-03 DIAGNOSIS — I48.0 PAROXYSMAL ATRIAL FIBRILLATION: Primary | ICD-10-CM

## 2024-06-03 LAB — INR PPP: 3.2

## 2024-06-03 NOTE — PROGRESS NOTES
Anticoagulation Clinic Progress Note    Anticoagulation Summary  As of 6/3/2024      INR goal:  2.0-3.0   TTR:  66.7% (5.5 y)   INR used for dosing:  3.20 (6/3/2024)   Warfarin maintenance plan:  4 mg every Mon, Wed, Fri; 2 mg all other days   Weekly warfarin total:  20 mg   Plan last modified:  Petra Oliveira RPH (6/3/2024)   Next INR check:  6/17/2024   Priority:  Maintenance   Target end date:  Indefinite    Indications    Paroxysmal atrial fibrillation [I48.0]                 Anticoagulation Episode Summary       INR check location:      Preferred lab:      Send INR reminders to:   JACEY SINCLAIR  POOL    Comments:  [facility] beginning 4/22/20          Anticoagulation Care Providers       Provider Role Specialty Phone number    Jonah Regalado Jr., MD Referring Cardiology [phone]            Clinic Interview:  Patient Findings     Negatives:  Signs/symptoms of thrombosis, Signs/symptoms of bleeding,   Laboratory test error suspected, Change in health, Change in alcohol use,   Change in activity, Upcoming invasive procedure, Emergency department   visit, Upcoming dental procedure, Missed doses, Extra doses, Change in   medications, Change in diet/appetite, Hospital admission, Bruising, Other   complaints      Clinical Outcomes     Negatives:  Major bleeding event, Thromboembolic event,   Anticoagulation-related hospital admission, Anticoagulation-related ED   visit, Anticoagulation-related fatality        INR History:      4/16/2024     5:29 AM 4/19/2024    12:00 AM 4/19/2024    10:44 AM 5/15/2024    12:00 AM 5/16/2024     9:16 AM 6/3/2024    12:00 AM 6/3/2024     1:54 PM   Anticoagulation Monitoring   INR   3.30  3.40  3.20   INR Date   4/19/2024  5/15/2024  6/3/2024   INR Goal   2.0-3.0  2.0-3.0  2.0-3.0   Trend   Same  Down  Down   Last Week Total   22 mg  24 mg  22 mg   Next Week Total   22 mg  20 mg  18 mg   Sun   2 mg  2 mg  2 mg   Mon   4 mg  4 mg  2 mg (6/3); Otherwise 4 mg   Tue   4  mg  2 mg  2 mg   Wed   4 mg  4 mg  4 mg   Thu   4 mg  2 mg (5/16, 5/23)  2 mg   Fri   Hold (4/19); Otherwise 2 mg  2 mg (5/17); Otherwise 4 mg  4 mg   Sat   4 mg  4 mg  2 mg   Historical INR 2.50  3.30      3.40      3.20        Visit Report      Report Report       This result is from an external source.       Plan:  1. INR is Supratherapeutic today- see above in Anticoagulation Summary.   Will instruct Caitlyn Longoria to Change their warfarin regimen- see above in Anticoagulation Summary.  partial to 2 mg then trial on 4 mg MWF, 2 mg AOD, rck 2 weeks   2. Follow up in 2 weeks  3. They have been instructed to call if any changes in medications, doses, concerns, etc. Patient expresses understanding and has no further questions at this time.    Petra Oliveira [facility]

## 2024-06-17 ENCOUNTER — OFFICE VISIT (OUTPATIENT)
Dept: WOUND CARE | Facility: HOSPITAL | Age: 89
End: 2024-06-17
Payer: MEDICARE

## 2024-06-17 ENCOUNTER — LAB REQUISITION (OUTPATIENT)
Dept: LAB | Facility: HOSPITAL | Age: 89
End: 2024-06-17
Payer: MEDICARE

## 2024-06-17 DIAGNOSIS — L89.812 PRESSURE ULCER OF HEAD, STAGE 2: ICD-10-CM

## 2024-06-17 PROCEDURE — 87070 CULTURE OTHR SPECIMN AEROBIC: CPT | Performed by: EMERGENCY MEDICINE

## 2024-06-17 PROCEDURE — 87075 CULTR BACTERIA EXCEPT BLOOD: CPT | Performed by: EMERGENCY MEDICINE

## 2024-06-17 PROCEDURE — 87205 SMEAR GRAM STAIN: CPT | Performed by: EMERGENCY MEDICINE

## 2024-06-19 ENCOUNTER — ANTICOAGULATION VISIT (OUTPATIENT)
Dept: PHARMACY | Facility: HOSPITAL | Age: 89
End: 2024-06-19
Payer: MEDICARE

## 2024-06-19 DIAGNOSIS — I48.0 PAROXYSMAL ATRIAL FIBRILLATION: Primary | ICD-10-CM

## 2024-06-19 LAB — INR PPP: 2.2

## 2024-06-19 NOTE — PROGRESS NOTES
Anticoagulation Clinic Progress Note    Anticoagulation Summary  As of 2024      INR goal:  2.0-3.0   TTR:  66.8% (5.5 y)   INR used for dosin.20 (2024)   Warfarin maintenance plan:  4 mg every Mon, Wed, Fri; 2 mg all other days   Weekly warfarin total:  20 mg   No change documented:  Petra Oliveira RPH   Plan last modified:  Petra Oliveira RPH (6/3/2024)   Next INR check:  7/3/2024   Priority:  Maintenance   Target end date:  Indefinite    Indications    Paroxysmal atrial fibrillation [I48.0]                 Anticoagulation Episode Summary       INR check location:      Preferred lab:      Send INR reminders to:   JACEY [facility]  POOL    Comments:  [facility] beginning 20          Anticoagulation Care Providers       Provider Role Specialty Phone number    Jonah Regalado Jr., MD Referring Cardiology [phone]            Clinic Interview:  No pertinent clinical findings have been reported.    INR History:      2024    10:44 AM 5/15/2024    12:00 AM 2024     9:16 AM 6/3/2024    12:00 AM 6/3/2024     1:54 PM 2024    12:00 AM 2024     2:01 PM   Anticoagulation Monitoring   INR 3.30  3.40  3.20  2.20   INR Date 2024  5/15/2024  6/3/2024  2024   INR Goal 2.0-3.0  2.0-3.0  2.0-3.0  2.0-3.0   Trend Same  Down  Down  Same   Last Week Total 22 mg  24 mg  22 mg  20 mg   Next Week Total 22 mg  20 mg  18 mg  20 mg   Sun 2 mg  2 mg  2 mg  2 mg   Mon 4 mg  4 mg  2 mg (6/3); Otherwise 4 mg  4 mg   Tue 4 mg  2 mg  2 mg  2 mg   Wed 4 mg  4 mg  4 mg  4 mg   Thu 4 mg  2 mg (, )  2 mg  2 mg   Fri Hold (); Otherwise 2 mg  2 mg (); Otherwise 4 mg  4 mg  4 mg   Sat 4 mg  4 mg  2 mg  2 mg   Historical INR  3.40      3.20      2.20        Visit Report    Report Report         This result is from an external source.       Plan:  1. INR is therapeutic today- see above in Anticoagulation Summary.    Caitlyn Longoria to continue their warfarin regimen- see  above in Anticoagulation Summary.  2. Follow up in 2 weeks  3.  They have been instructed to call if any changes in medications, doses, concerns, etc. Patient expresses understanding and has no further questions at this time.    Petra Oliveira RP

## 2024-06-20 LAB
BACTERIA SPEC AEROBE CULT: NORMAL
GRAM STN SPEC: NORMAL
GRAM STN SPEC: NORMAL

## 2024-06-21 NOTE — TELEPHONE ENCOUNTER
Pt calling stating she has had a cold, with nasal congestion and cough but reports she is feeling some better this week.   Pt denies any chills or fevers  Pt is due for IVIG on Wednesday.   Advised patient that she could keep her appointment as long as she does not have fever , advised if pt has worsening of symptoms to let us know.   Pt V/U  
Pt has an infusion scheduled for Wednesday, she has had a cold for two weeks, she is a little better. Can she still get the infusion?   
21-Jun-2024 20:53

## 2024-06-22 LAB — BACTERIA SPEC ANAEROBE CULT: NORMAL

## 2024-07-01 ENCOUNTER — OFFICE VISIT (OUTPATIENT)
Dept: WOUND CARE | Facility: HOSPITAL | Age: 89
End: 2024-07-01
Payer: MEDICARE

## 2024-07-03 ENCOUNTER — ANTICOAGULATION VISIT (OUTPATIENT)
Dept: PHARMACY | Facility: HOSPITAL | Age: 89
End: 2024-07-03
Payer: MEDICARE

## 2024-07-03 DIAGNOSIS — I48.0 PAROXYSMAL ATRIAL FIBRILLATION: Primary | ICD-10-CM

## 2024-07-03 LAB — INR PPP: 2

## 2024-07-05 NOTE — PROGRESS NOTES
Anticoagulation Clinic Progress Note    Anticoagulation Summary  As of 7/3/2024      INR goal:  2.0-3.0   TTR:  67.1% (5.6 y)   INR used for dosin.00 (7/3/2024)   Warfarin maintenance plan:  4 mg every Mon, Wed, Fri; 2 mg all other days   Weekly warfarin total:  20 mg   No change documented:  Petra Oliveira RPH   Plan last modified:  Petra Oliveira RPH (6/3/2024)   Next INR check:  2024   Priority:  Maintenance   Target end date:  Indefinite    Indications    Paroxysmal atrial fibrillation [I48.0]                 Anticoagulation Episode Summary       INR check location:      Preferred lab:      Send INR reminders to:   JACE[facility]  POOL    Comments:  [facility] beginning 20          Anticoagulation Care Providers       Provider Role Specialty Phone number    Jonah Regalado Jr., MD Referring Cardiology [phone]            Clinic Interview:  Patient Findings     Negatives:  Signs/symptoms of thrombosis, Signs/symptoms of bleeding,   Laboratory test error suspected, Change in health, Change in alcohol use,   Change in activity, Upcoming invasive procedure, Emergency department   visit, Upcoming dental procedure, Missed doses, Extra doses, Change in   medications, Change in diet/appetite, Hospital admission, Bruising, Other   complaints      Clinical Outcomes     Negatives:  Major bleeding event, Thromboembolic event,   Anticoagulation-related hospital admission, Anticoagulation-related ED   visit, Anticoagulation-related fatality        INR History:      2024     9:16 AM 6/3/2024    12:00 AM 6/3/2024     1:54 PM 2024    12:00 AM 2024     2:01 PM 7/3/2024    12:00 AM 7/3/2024     1:39 PM   Anticoagulation Monitoring   INR 3.40  3.20  2.20  2.00   INR Date 5/15/2024  6/3/2024  2024  7/3/2024   INR Goal 2.0-3.0  2.0-3.0  2.0-3.0  2.0-3.0   Trend Down  Down  Same  Same   Last Week Total 24 mg  22 mg  20 mg  20 mg   Next Week Total 20 mg  18 mg  20 mg  20 mg    Sun 2 mg  2 mg  2 mg  2 mg   Mon 4 mg  2 mg (6/3); Otherwise 4 mg  4 mg  4 mg   Tue 2 mg  2 mg  2 mg  2 mg   Wed 4 mg  4 mg  4 mg  4 mg   Thu 2 mg (5/16, 5/23)  2 mg  2 mg  2 mg   Fri 2 mg (5/17); Otherwise 4 mg  4 mg  4 mg  4 mg   Sat 4 mg  2 mg  2 mg  2 mg   Historical INR  3.20      2.20      2.00        Visit Report  Report Report           This result is from an external source.       Plan:  1. INR is Therapeutic today- see above in Anticoagulation Summary.   Will instruct Caitlyn Longoria to Continue their warfarin regimen- see above in Anticoagulation Summary.  2. Follow up in 4 weeks  3. They have been instructed to call if any changes in medications, doses, concerns, etc. Patient expresses understanding and has no further questions at this time.    Petra Oliveira [facility]

## 2024-07-15 ENCOUNTER — OFFICE VISIT (OUTPATIENT)
Dept: WOUND CARE | Facility: HOSPITAL | Age: 89
End: 2024-07-15
Payer: MEDICARE

## 2024-07-22 ENCOUNTER — OFFICE VISIT (OUTPATIENT)
Dept: WOUND CARE | Facility: HOSPITAL | Age: 89
End: 2024-07-22
Payer: MEDICARE

## 2024-07-22 ENCOUNTER — TELEPHONE (OUTPATIENT)
Dept: CARDIOLOGY | Facility: CLINIC | Age: 89
End: 2024-07-22

## 2024-07-22 RX ORDER — CARVEDILOL 3.12 MG/1
3.12 TABLET ORAL 2 TIMES DAILY
Qty: 180 TABLET | Refills: 3 | Status: SHIPPED | OUTPATIENT
Start: 2024-07-22

## 2024-07-22 NOTE — TELEPHONE ENCOUNTER
Caller: Caitlyn Longoria    Relationship to patient: Self    Best call back number: [phone]    Chief complaint:    Type of visit: FOLLOW UP AND DEVICE CHECK    Requested date:      If rescheduling, when is the original appointment: 7.24.24     Additional notes:PT WILL BE APT [facility] FROM 10AM-12PM . PT RECEIVED CALL LAST FRIDAY FROM OFFICE STATING THEY NEEDED TO RESCHEDULE HER APPT SINCE DR WILL BE AT THE HOSPITAL THAT DAY. NO TIMEFRAME OR ENCOUNTER WAS LISTED FOR INSTRUCTION ON WHEN TO RE-SCHEDULE.

## 2024-07-31 ENCOUNTER — ANTICOAGULATION VISIT (OUTPATIENT)
Dept: PHARMACY | Facility: HOSPITAL | Age: 89
End: 2024-07-31
Payer: MEDICARE

## 2024-07-31 DIAGNOSIS — I48.0 PAROXYSMAL ATRIAL FIBRILLATION: Primary | ICD-10-CM

## 2024-07-31 LAB — INR PPP: 1.8

## 2024-08-01 ENCOUNTER — OFFICE VISIT (OUTPATIENT)
Dept: WOUND CARE | Facility: HOSPITAL | Age: 89
End: 2024-08-01
Payer: MEDICARE

## 2024-08-01 PROCEDURE — G0463 HOSPITAL OUTPT CLINIC VISIT: HCPCS

## 2024-08-01 NOTE — PROGRESS NOTES
Anticoagulation Clinic Progress Note    Anticoagulation Summary  As of 2024      INR goal:  2.0-3.0   TTR:  66.2% (5.6 y)   INR used for dosin.80 (2024)   Warfarin maintenance plan:  2 mg every Sun, Tue, Thu; 4 mg all other days   Weekly warfarin total:  22 mg   Plan last modified:  Alexander Valiente, PharmD (2024)   Next INR check:  2024   Priority:  Maintenance   Target end date:  Indefinite    Indications    Paroxysmal atrial fibrillation [I48.0]                 Anticoagulation Episode Summary       INR check location:      Preferred lab:      Send INR reminders to:  MAGALIS SINCLAIR  POOL    Comments:  [facility] beginning 20          Anticoagulation Care Providers       Provider Role Specialty Phone number    Jonah Regalado Jr., MD Referring Cardiology [phone]            Clinic Interview:  Patient Findings     Negatives:  Signs/symptoms of thrombosis, Signs/symptoms of bleeding,   Laboratory test error suspected, Change in health, Change in alcohol use,   Change in activity, Upcoming invasive procedure, Emergency department   visit, Upcoming dental procedure, Missed doses, Extra doses, Change in   medications, Change in diet/appetite, Hospital admission, Bruising, Other   complaints      Clinical Outcomes     Negatives:  Major bleeding event, Thromboembolic event,   Anticoagulation-related hospital admission, Anticoagulation-related ED   visit, Anticoagulation-related fatality        INR History:      6/3/2024     1:54 PM 2024    12:00 AM 2024     2:01 PM 7/3/2024    12:00 AM 7/3/2024     1:39 PM 2024    12:00 AM 2024    12:59 PM   Anticoagulation Monitoring   INR 3.20  2.20  2.00  1.80   INR Date 6/3/2024  2024  7/3/2024  2024   INR Goal 2.0-3.0  2.0-3.0  2.0-3.0  2.0-3.0   Trend Down  Same  Same  Up   Last Week Total 22 mg  20 mg  20 mg  20 mg   Next Week Total 18 mg  20 mg  20 mg  22 mg   Sun 2 mg  2 mg  2 mg  2 mg   Mon 2 mg (6/3);  Otherwise 4 mg  4 mg  4 mg  4 mg   Tue 2 mg  2 mg  2 mg  2 mg   Wed 4 mg  4 mg  4 mg  4 mg   Thu 2 mg  2 mg  2 mg  2 mg   Fri 4 mg  4 mg  4 mg  4 mg   Sat 2 mg  2 mg  2 mg  4 mg   Historical INR  2.20      2.00      1.80        Visit Report Report             This result is from an external source.       Plan:  1. INR is Subtherapeutic today- see above in Anticoagulation Summary.   Will instruct Caitlyn GARRETT Longoria to Increase their warfarin regimen to 4 mg MWFSat, 2 mg all other days - see above in Anticoagulation Summary.  2. Follow up in 2 weeks.  3. They have been instructed to call if any changes in medications, doses, concerns, etc. Patient expresses understanding and has no further questions at this time.    Alexander Valiente, PharmD

## 2024-08-07 ENCOUNTER — OFFICE VISIT (OUTPATIENT)
Dept: CARDIOLOGY | Facility: CLINIC | Age: 89
End: 2024-08-07
Payer: MEDICARE

## 2024-08-07 ENCOUNTER — CLINICAL SUPPORT NO REQUIREMENTS (OUTPATIENT)
Age: 89
End: 2024-08-07
Payer: MEDICARE

## 2024-08-07 VITALS
HEIGHT: 62 IN | SYSTOLIC BLOOD PRESSURE: 128 MMHG | HEART RATE: 83 BPM | DIASTOLIC BLOOD PRESSURE: 74 MMHG | BODY MASS INDEX: 33.68 KG/M2 | WEIGHT: 183 LBS

## 2024-08-07 DIAGNOSIS — I48.0 PAROXYSMAL ATRIAL FIBRILLATION: ICD-10-CM

## 2024-08-07 DIAGNOSIS — E78.5 HYPERLIPIDEMIA, UNSPECIFIED HYPERLIPIDEMIA TYPE: ICD-10-CM

## 2024-08-07 DIAGNOSIS — I25.119 CORONARY ARTERY DISEASE INVOLVING NATIVE CORONARY ARTERY OF NATIVE HEART WITH ANGINA PECTORIS: ICD-10-CM

## 2024-08-07 DIAGNOSIS — I48.91 ATRIAL FIBRILLATION AND FLUTTER: Primary | ICD-10-CM

## 2024-08-07 DIAGNOSIS — I10 PRIMARY HYPERTENSION: ICD-10-CM

## 2024-08-07 DIAGNOSIS — I77.9 BILATERAL CAROTID ARTERY DISEASE, UNSPECIFIED TYPE: Primary | ICD-10-CM

## 2024-08-07 DIAGNOSIS — I48.92 ATRIAL FIBRILLATION AND FLUTTER: Primary | ICD-10-CM

## 2024-08-07 DIAGNOSIS — I50.42 CHRONIC COMBINED SYSTOLIC AND DIASTOLIC CONGESTIVE HEART FAILURE: ICD-10-CM

## 2024-08-07 DIAGNOSIS — I87.321 STASIS DERMATITIS OF RIGHT LOWER EXTREMITY DUE TO PERIPHERAL VENOUS HYPERTENSION: ICD-10-CM

## 2024-08-07 NOTE — PROGRESS NOTES
Subjective:     Encounter Date:08/07/2024      Patient ID: Caitlyn Longoria is a 89 y.o. female.    Chief Complaint:follow up CAD and afib  History of Present Illness  This is an 89 y/o female who follows with Dr. Regalado and is new to me today. She has a pmhx of paroxysmal atrial fibrillation, SSS, CAD, chronic systolic CHF, CVA, ICM, CKD, hyperlipidemia, and diabetes.    She is here today for a follow up visit. She has been doing well from a cardiac standpoint with no chest pain, palpitations, dizziness or syncope. 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 She wears O2 at night in her CPAP. Her lower extremity swelling has improved. She recently completed 13 weeks of treatment with the wound care clinic. She had an episode recently where she had pain down her left arm to there elbow. It lasted about 5 minutes or less. She says that she has a bad left shoulder with severe arthritis. She isn't sure if it is arthritic pain or heart related. It has not occurred since and she denies any associated symptoms when this occurred. She was not doing anything when the episode happened.     Prior history:  She had an MI in the past and underwent a cardiac catheterization in 2008 that showed total occlusion of the mid LAD, severe disease of the large diagonal and significant disease of the RCA and circumflex. She underwent angioplasty and LOLA to the diagonal. Her EF at that time was 35%. She did develop a right femoral pseudoaneurysm which was surgically repaired.     In 2008, she underwent an echocardiogram that showed recovery of her LV function.    In 2011, she underwent dual-chamber pacemaker placement for severe symptomatic bradycardia. She also underwent an atrial flutter ablation the same year.    In 2012, she underwent a cardiac catheterization for recurrent angina. She was found to have in-stent restenosis of the diagonal vessel with  of the mid LAD. She also had PCI to the  diagonal vessel. A right heart cath was performed as well that showed PA systolic pressure of 53 mmHg. She presented with heart failure in 2017 when echocardiogram showed EF of 23% with grade 1 diastolic dysfunction calcific valvular disease with no significant stenosis or regurgitation. After being placed back on GDMT improved EF found on echocardiogram in 2018 and 35 to 40%. Since then she has developed worsening reactive airway disease and is currently on 2 to 3 L of home oxygen.     She was last seen by Dr. Regalado in July 2023 and was doing well. No changes were made.    I have reviewed and updated as appropriate allergies, current medications, past family history, past medical history, past surgical history and problem list.    Review of Systems   Constitutional: Negative for fever, malaise/fatigue, weight gain and weight loss.   HENT:  Negative for congestion, hoarse voice and sore throat.    Eyes:  Negative for blurred vision and double vision.   Cardiovascular:  Negative for chest pain, dyspnea on exertion, leg swelling, orthopnea, palpitations and syncope.   Respiratory:  Positive for shortness of breath. Negative for cough and wheezing.    Gastrointestinal:  Negative for abdominal pain, hematemesis, hematochezia and melena.   Genitourinary:  Negative for dysuria and hematuria.   Neurological:  Negative for dizziness, headaches, light-headedness and numbness.   Psychiatric/Behavioral:  Negative for depression. The patient is not nervous/anxious.          Current Outpatient Medications:     acetaminophen (TYLENOL) 325 MG tablet, Take 2 tablets by mouth Every 4 (Four) Hours As Needed for Mild Pain ., Disp:  , Rfl:     albuterol (PROVENTIL) (2.5 MG/3ML) 0.083% nebulizer solution, Take 2.5 mg by nebulization Every 4 (Four) Hours., Disp: , Rfl:     albuterol sulfate  (90 Base) MCG/ACT inhaler, Inhale 2 puffs Every 4 (Four) Hours As Needed for Wheezing., Disp: 1 inhaler, Rfl: 3    Benralizumab (FASENRA  SC), Inject  under the skin into the appropriate area as directed. Gets one shot a month, next shot may 13 then one every 8 weeks, Disp: , Rfl:     budesonide (PULMICORT) 0.5 MG/2ML nebulizer solution, Take 2 mL by nebulization 2 (Two) Times a Day., Disp: , Rfl:     Calcium Carbonate-Vitamin D (calcium-vitamin D) 500-200 MG-UNIT tablet per tablet, Take 1 tablet by mouth., Disp: , Rfl:     carvedilol (COREG) 3.125 MG tablet, TAKE 1 TABLET TWICE A DAY, Disp: 180 tablet, Rfl: 3    Cetirizine HCl 10 MG capsule, Take  by mouth., Disp: , Rfl:     fluticasone (FLONASE) 50 MCG/ACT nasal spray, 1 spray into the nostril(s) as directed by provider Daily., Disp: , Rfl:     furosemide (LASIX) 20 MG tablet, Take 1 tablet by mouth As Needed (For weight gain of greater than 2 pounds in 1 day or 5 pounds in 1 week)., Disp: 30 tablet, Rfl: 11    glipizide (GLUCOTROL) 5 MG tablet, Take 1 tablet by mouth. Take one half tablet by mouth daily, Disp: , Rfl:     melatonin 5 MG tablet tablet, Take 1 tablet by mouth Every Night., Disp: , Rfl:     metFORMIN ER (GLUCOPHAGE-XR) 500 MG 24 hr tablet, , Disp: , Rfl:     montelukast (SINGULAIR) 10 MG tablet, Take 1 tablet by mouth Every Night., Disp: , Rfl:     oxybutynin XL (DITROPAN-XL) 10 MG 24 hr tablet, Take 1 tablet by mouth 2 (Two) Times a Day., Disp: , Rfl:     polyethyl glycol-propyl glycol (SYSTANE) 0.4-0.3 % solution ophthalmic solution (artificial tears), Administer 1 drop to both eyes Every 1 (One) Hour As Needed., Disp: , Rfl:     revefenacin (Yupelri) 175 MCG/3ML nebulizer solution, Take  by nebulization Daily., Disp: , Rfl:     rosuvastatin (CRESTOR) 20 MG tablet, Take 1 tablet by mouth Every Night., Disp: , Rfl:     warfarin (COUMADIN) 4 MG tablet, TAKE ONE-HALF (1/2) TABLET ON SUNDAY AND FRIDAY AND TAKE ONE TABLET ALL OTHER DAYS OR AS DIRECTED, Disp: 80 tablet, Rfl: 1    Past Medical History:   Diagnosis Date    Aortic calcification     mild, on echo 12/17/2017    Aortic  regurgitation     Trace    Asthma     Atrial fibrillation     CAD (coronary artery disease)     Carpal tunnel syndrome of left wrist     Chronic combined systolic and diastolic congestive heart failure     CKD (chronic kidney disease) stage 3, GFR 30-59 ml/min     COPD (chronic obstructive pulmonary disease)     Coronary artery disease involving native coronary artery of native heart with angina pectoris     Disc degeneration, lumbar     Diverticulosis     DM type 2 (diabetes mellitus, type 2)     GERD (gastroesophageal reflux disease)     History of aneurysm     right femoral artery s/p LHC    History of blood transfusion     History of fracture     History of heart attack     History of vitamin D deficiency     Hyperlipidemia     Hypertension     Leukemia     Mild mitral regurgitation     Mitral annular calcification     12/8/2017- echo, moderate    Osteopenia     PAF (paroxysmal atrial fibrillation)     Peripheral neuropathy     Skin cancer     Left hand    Sleep apnea     bipap    SSS (sick sinus syndrome)     Stroke (cerebrum)     TIA (transient ischemic attack) 2017    Tricuspid regurgitation     Trace       Past Surgical History:   Procedure Laterality Date    BRONCHOSCOPY Bilateral 10/6/2020    Procedure: BRONCHOSCOPY with BILATERAL LUNG washings;  Surgeon: Juno Coburn MD;  Location: [facility] ENDOSCOPY;  Service: Pulmonary;  Laterality: Bilateral;  PRE: purulent bronchitis  POST: PURULENT BRONCHITIS    BRONCHOSCOPY Bilateral 10/9/2020    Procedure: BRONCHOSCOPY with washing;  Surgeon: Juno Coburn MD;  Location: [facility] ENDOSCOPY;  Service: Pulmonary;  Laterality: Bilateral;  pre/post - mucous plug      CARDIAC CATHETERIZATION      CARDIAC ELECTROPHYSIOLOGY PROCEDURE N/A 2/7/2020    Procedure: PPM generator change - dual  medtronic;  Surgeon: Kiel Field MD;  Location: [facility] CATH INVASIVE LOCATION;  Service: Cardiology;  Laterality: N/A;    CHOLECYSTECTOMY      CORONARY STENT PLACEMENT       "ENDOSCOPY N/A 9/14/2022    Procedure: ESOPHAGOGASTRODUODENOSCOPY with 54fr main dilatation;  Surgeon: Enio Cota MD;  Location: [facility] ENDOSCOPY;  Service: Gastroenterology;  Laterality: N/A;  pre - dysphagia  post - s/p dilatation, watermelon stomach    HERNIA REPAIR      hital hernia    HYSTERECTOMY      PACEMAKER IMPLANTATION      REPLACEMENT TOTAL KNEE Bilateral        Family History   Problem Relation Age of Onset    Heart disease Mother     Hypertension Mother     Colon polyps Mother     Heart disease Father     Hypertension Father     Stroke Father     Hypertension Brother     Heart disease Brother     Diabetes Niece     Colon cancer Sister     Hypertension Sister     Hypertension Daughter     Hypertension Son     Hypertension Maternal Aunt     Hypertension Maternal Grandmother     Hypertension Maternal Grandfather     Hypertension Paternal Grandmother     Hypertension Paternal Grandfather        Social History     Tobacco Use    Smoking status: Never     Passive exposure: Never    Smokeless tobacco: Never    Tobacco comments:     caffeine use- soda   Vaping Use    Vaping status: Never Used   Substance Use Topics    Alcohol use: Never    Drug use: No         ECG 12 Lead    Date/Time: 8/7/2024 11:08 AM  Performed by: Sarah Barksdale APRN    Authorized by: Sarah Barksdale APRN  Comparison: compared with previous ECG from 4/15/2024  Similar to previous ECG  Rhythm: paced  Pacing: ventricular paced rhythm           Objective:     Visit Vitals  /74   Ht 157.5 cm (62\")   Wt 83 kg (183 lb)   LMP  (LMP Unknown)   BMI 33.47 kg/m²             Physical Exam  Constitutional:       Appearance: Normal appearance. She is obese.   HENT:      Head: Normocephalic.   Neck:      Vascular: No carotid bruit.   Cardiovascular:      Rate and Rhythm: Normal rate and regular rhythm.      Chest Wall: PMI is not displaced.      Pulses: Normal pulses.           Radial pulses are 2+ on the right side and 2+ on the left " side.        Posterior tibial pulses are 2+ on the right side and 2+ on the left side.      Heart sounds: Normal heart sounds. No murmur heard.     No friction rub. No gallop.   Pulmonary:      Effort: Pulmonary effort is normal.      Breath sounds: Normal breath sounds. Examination of the left-middle field reveals decreased breath sounds. Examination of the left-lower field reveals decreased breath sounds.   Abdominal:      General: Bowel sounds are normal. There is no distension.      Palpations: Abdomen is soft.   Musculoskeletal:      Right lower leg: No edema.      Left lower leg: No edema.   Skin:     General: Skin is warm and dry.      Capillary Refill: Capillary refill takes less than 2 seconds.   Neurological:      Mental Status: She is alert and oriented to person, place, and time.   Psychiatric:         Mood and Affect: Mood normal.         Behavior: Behavior normal.         Thought Content: Thought content normal.          Lab Review:         Cardiac Procedures:       Assessment:         Diagnoses and all orders for this visit:    1. Bilateral carotid artery disease, unspecified type (Primary)    2. Chronic combined systolic and diastolic congestive heart failure    3. Coronary artery disease involving native coronary artery of native heart with angina pectoris    4. Hyperlipidemia, unspecified hyperlipidemia type    5. Primary hypertension    6. Paroxysmal atrial fibrillation    7. Stasis dermatitis of right lower extremity due to peripheral venous hypertension            Plan:         CAD: EKG is stable, no anginal symptoms reported. She did have an episode of left arm pain, not similar to what she experienced with past MI. Recommend monitoring for now.   Chronic systolic and diastolic CHF: Appears compensated on exam. On carvedilol and furosemide. No ACE/ARB due to hypotension.  HTN: Blood pressure well controlled. No changes.  HLD: on statin. Goal LDL < 70  PAF: on warfarin for anticoagulation with  no bleeding issues. [id number] Score: 9    SSS: s/p PPM  History of CVA  CHARLENE  CKD  Diabetes    Thank you for allowing me to participate in this patient's care. Please call with any questions or concerns. Ms Longoria will follow up with Dr. Regalado in 1 year.          Your medication list            Accurate as of August 7, 2024  9:53 AM. If you have any questions, ask your nurse or doctor.                CONTINUE taking these medications        Instructions Last Dose Given Next Dose Due   acetaminophen 325 MG tablet  Commonly known as: TYLENOL      Take 2 tablets by mouth Every 4 (Four) Hours As Needed for Mild Pain .       albuterol (2.5 MG/3ML) 0.083% nebulizer solution  Commonly known as: PROVENTIL      Take 2.5 mg by nebulization Every 4 (Four) Hours.       albuterol sulfate  (90 Base) MCG/ACT inhaler  Commonly known as: PROVENTIL HFA;VENTOLIN HFA;PROAIR HFA      Inhale 2 puffs Every 4 (Four) Hours As Needed for Wheezing.       budesonide 0.5 MG/2ML nebulizer solution  Commonly known as: PULMICORT      Take 2 mL by nebulization 2 (Two) Times a Day.       calcium-vitamin D 500-200 MG-UNIT tablet per tablet      Take 1 tablet by mouth.       carvedilol 3.125 MG tablet  Commonly known as: COREG      TAKE 1 TABLET TWICE A DAY       Cetirizine HCl 10 MG capsule      Take  by mouth.       FASENRA SC      Inject  under the skin into the appropriate area as directed. Gets one shot a month, next shot may 13 then one every 8 weeks       fluticasone 50 MCG/ACT nasal spray  Commonly known as: FLONASE      1 spray into the nostril(s) as directed by provider Daily.       furosemide 20 MG tablet  Commonly known as: LASIX      Take 1 tablet by mouth As Needed (For weight gain of greater than 2 pounds in 1 day or 5 pounds in 1 week).       glipizide 5 MG tablet  Commonly known as: GLUCOTROL      Take 1 tablet by mouth. Take one half tablet by mouth daily       melatonin 5 MG tablet tablet      Take 1 tablet by mouth  Every Night.       metFORMIN  MG 24 hr tablet  Commonly known as: GLUCOPHAGE-XR           montelukast 10 MG tablet  Commonly known as: SINGULAIR      Take 1 tablet by mouth Every Night.       oxybutynin XL 10 MG 24 hr tablet  Commonly known as: DITROPAN-XL      Take 1 tablet by mouth 2 (Two) Times a Day.       polyethyl glycol-propyl glycol 0.4-0.3 % solution ophthalmic solution (artificial tears)  Commonly known as: SYSTANE      Administer 1 drop to both eyes Every 1 (One) Hour As Needed.       rosuvastatin 20 MG tablet  Commonly known as: CRESTOR      Take 1 tablet by mouth Every Night.       warfarin 4 MG tablet  Commonly known as: COUMADIN      TAKE ONE-HALF (1/2) TABLET ON SUNDAY AND FRIDAY AND TAKE ONE TABLET ALL OTHER DAYS OR AS DIRECTED       Yupelri 175 MCG/3ML nebulizer solution  Generic drug: revefenacin      Take  by nebulization Daily.                  KRISTIN Oh  08/07/24  9:53 AM EDT

## 2024-08-14 ENCOUNTER — ANTICOAGULATION VISIT (OUTPATIENT)
Dept: PHARMACY | Facility: HOSPITAL | Age: 89
End: 2024-08-14
Payer: MEDICARE

## 2024-08-14 DIAGNOSIS — I48.0 PAROXYSMAL ATRIAL FIBRILLATION: Primary | ICD-10-CM

## 2024-08-14 LAB — INR PPP: 1.9

## 2024-08-14 NOTE — PROGRESS NOTES
Anticoagulation Clinic Progress Note    Anticoagulation Summary  As of 2024      INR goal:  2.0-3.0   TTR:  65.7% (5.7 y)   INR used for dosin.90 (2024)   Warfarin maintenance plan:  2 mg every Tue, Thu; 4 mg all other days   Weekly warfarin total:  24 mg   Plan last modified:  Petra Oliveira RPH (2024)   Next INR check:  2024   Priority:  Maintenance   Target end date:  Indefinite    Indications    Paroxysmal atrial fibrillation [I48.0]                 Anticoagulation Episode Summary       INR check location:      Preferred lab:      Send INR reminders to:   JACEY SINCLAIR  POOL    Comments:  [facility] beginning 20          Anticoagulation Care Providers       Provider Role Specialty Phone number    Jonah Regalado Jr., MD Referring Cardiology [phone]            Clinic Interview:  Patient Findings     Negatives:  Signs/symptoms of thrombosis, Signs/symptoms of bleeding,   Laboratory test error suspected, Change in health, Change in alcohol use,   Change in activity, Upcoming invasive procedure, Emergency department   visit, Upcoming dental procedure, Missed doses, Extra doses, Change in   medications, Change in diet/appetite, Hospital admission, Bruising, Other   complaints      Clinical Outcomes     Negatives:  Major bleeding event, Thromboembolic event,   Anticoagulation-related hospital admission, Anticoagulation-related ED   visit, Anticoagulation-related fatality        INR History:      2024     2:01 PM 7/3/2024    12:00 AM 7/3/2024     1:39 PM 2024    12:00 AM 2024    12:59 PM 2024    12:00 AM 2024    11:30 AM   Anticoagulation Monitoring   INR 2.20  2.00  1.80  1.90   INR Date 2024  7/3/2024  2024  2024   INR Goal 2.0-3.0  2.0-3.0  2.0-3.0  2.0-3.0   Trend Same  Same  Up  Up   Last Week Total 20 mg  20 mg  20 mg  22 mg   Next Week Total 20 mg  20 mg  22 mg  24 mg   Sun 2 mg  2 mg  2 mg  4 mg   Mon 4 mg  4 mg  4 mg   4 mg   Tue 2 mg  2 mg  2 mg  2 mg   Wed 4 mg  4 mg  4 mg  4 mg   Thu 2 mg  2 mg  2 mg  2 mg   Fri 4 mg  4 mg  4 mg  4 mg   Sat 2 mg  2 mg  4 mg  4 mg   Historical INR  2.00      1.80      1.90            This result is from an external source.       Plan:  1. INR is Subtherapeutic today- see above in Anticoagulation Summary.   Will instruct Caitlyn Longoria to Increase their warfarin regimen- see above in Anticoagulation Summary.  increase to 2 mg tues, thurs and 4 mg AOD, rck 2 weeks   2. Follow up in 2 weeks  3. They have been instructed to call if any changes in medications, doses, concerns, etc. Patient expresses understanding and has no further questions at this time.    Petra Oliveira RP

## 2024-08-22 ENCOUNTER — APPOINTMENT (OUTPATIENT)
Dept: GENERAL RADIOLOGY | Facility: HOSPITAL | Age: 89
DRG: 152 | End: 2024-08-22
Payer: MEDICARE

## 2024-08-22 ENCOUNTER — HOSPITAL ENCOUNTER (INPATIENT)
Facility: HOSPITAL | Age: 89
LOS: 3 days | Discharge: HOME | DRG: 152 | End: 2024-08-25
Attending: EMERGENCY MEDICINE | Admitting: HOSPITALIST
Payer: MEDICARE

## 2024-08-22 DIAGNOSIS — J44.9 CHRONIC OBSTRUCTIVE PULMONARY DISEASE, UNSPECIFIED COPD TYPE: ICD-10-CM

## 2024-08-22 DIAGNOSIS — N18.30 STAGE 3 CHRONIC KIDNEY DISEASE, UNSPECIFIED WHETHER STAGE 3A OR 3B CKD: ICD-10-CM

## 2024-08-22 DIAGNOSIS — R06.02 SHORTNESS OF BREATH: Primary | ICD-10-CM

## 2024-08-22 DIAGNOSIS — I50.9 ACUTE HEART FAILURE, UNSPECIFIED HEART FAILURE TYPE: ICD-10-CM

## 2024-08-22 DIAGNOSIS — E87.6 HYPOKALEMIA: ICD-10-CM

## 2024-08-22 LAB
ALBUMIN SERPL-MCNC: 3.6 G/DL (ref 3.5–5.2)
ALBUMIN/GLOB SERPL: 1.6 G/DL
ALP SERPL-CCNC: 82 U/L (ref 39–117)
ALT SERPL W P-5'-P-CCNC: 20 U/L (ref 1–33)
ANION GAP SERPL CALCULATED.3IONS-SCNC: 11 MMOL/L (ref 5–15)
ANION GAP SERPL CALCULATED.3IONS-SCNC: 11.1 MMOL/L (ref 5–15)
ANISOCYTOSIS BLD QL: ABNORMAL
AST SERPL-CCNC: 26 U/L (ref 1–32)
B PARAPERT DNA SPEC QL NAA+PROBE: NOT DETECTED
B PERT DNA SPEC QL NAA+PROBE: NOT DETECTED
BASOPHILS # BLD MANUAL: 0 10*3/MM3 (ref 0–0.2)
BASOPHILS NFR BLD MANUAL: 0 % (ref 0–1.5)
BILIRUB SERPL-MCNC: 1.7 MG/DL (ref 0–1.2)
BUN SERPL-MCNC: 17 MG/DL (ref 8–23)
BUN SERPL-MCNC: 17 MG/DL (ref 8–23)
BUN/CREAT SERPL: 22.4 (ref 7–25)
BUN/CREAT SERPL: 25.8 (ref 7–25)
C PNEUM DNA NPH QL NAA+NON-PROBE: NOT DETECTED
CA-I SERPL ISE-MCNC: 0.84 MMOL/L (ref 1.15–1.35)
CALCIUM SPEC-SCNC: 7.7 MG/DL (ref 8.6–10.5)
CALCIUM SPEC-SCNC: 8.7 MG/DL (ref 8.6–10.5)
CHLORIDE SERPL-SCNC: 104 MMOL/L (ref 98–107)
CHLORIDE SERPL-SCNC: 108 MMOL/L (ref 98–107)
CO2 SERPL-SCNC: 21 MMOL/L (ref 22–29)
CO2 SERPL-SCNC: 23.9 MMOL/L (ref 22–29)
CREAT SERPL-MCNC: 0.66 MG/DL (ref 0.57–1)
CREAT SERPL-MCNC: 0.76 MG/DL (ref 0.57–1)
DACRYOCYTES BLD QL SMEAR: ABNORMAL
DEPRECATED RDW RBC AUTO: 45.4 FL (ref 37–54)
EGFRCR SERPLBLD CKD-EPI 2021: 75 ML/MIN/1.73
EGFRCR SERPLBLD CKD-EPI 2021: 84 ML/MIN/1.73
EOSINOPHIL # BLD MANUAL: 0 10*3/MM3 (ref 0–0.4)
EOSINOPHIL NFR BLD MANUAL: 0 % (ref 0.3–6.2)
ERYTHROCYTE [DISTWIDTH] IN BLOOD BY AUTOMATED COUNT: 12.9 % (ref 12.3–15.4)
FLUAV SUBTYP SPEC NAA+PROBE: NOT DETECTED
FLUBV RNA ISLT QL NAA+PROBE: NOT DETECTED
GEN 5 2HR TROPONIN T REFLEX: 56 NG/L
GLOBULIN UR ELPH-MCNC: 2.2 GM/DL
GLUCOSE BLDC GLUCOMTR-MCNC: 204 MG/DL (ref 70–130)
GLUCOSE BLDC GLUCOMTR-MCNC: 221 MG/DL (ref 70–130)
GLUCOSE BLDC GLUCOMTR-MCNC: 55 MG/DL (ref 70–130)
GLUCOSE BLDC GLUCOMTR-MCNC: 66 MG/DL (ref 70–130)
GLUCOSE SERPL-MCNC: 128 MG/DL (ref 65–99)
GLUCOSE SERPL-MCNC: 59 MG/DL (ref 65–99)
HADV DNA SPEC NAA+PROBE: NOT DETECTED
HCOV 229E RNA SPEC QL NAA+PROBE: NOT DETECTED
HCOV HKU1 RNA SPEC QL NAA+PROBE: NOT DETECTED
HCOV NL63 RNA SPEC QL NAA+PROBE: NOT DETECTED
HCOV OC43 RNA SPEC QL NAA+PROBE: NOT DETECTED
HCT VFR BLD AUTO: 35.8 % (ref 34–46.6)
HGB BLD-MCNC: 11.8 G/DL (ref 12–15.9)
HMPV RNA NPH QL NAA+NON-PROBE: NOT DETECTED
HOLD SPECIMEN: NORMAL
HOLD SPECIMEN: NORMAL
HPIV1 RNA ISLT QL NAA+PROBE: NOT DETECTED
HPIV2 RNA SPEC QL NAA+PROBE: NOT DETECTED
HPIV3 RNA NPH QL NAA+PROBE: NOT DETECTED
HPIV4 P GENE NPH QL NAA+PROBE: NOT DETECTED
INR PPP: 2.68 (ref 0.9–1.1)
LYMPHOCYTES # BLD MANUAL: 9.99 10*3/MM3 (ref 0.7–3.1)
LYMPHOCYTES NFR BLD MANUAL: 0 % (ref 5–12)
M PNEUMO IGG SER IA-ACNC: NOT DETECTED
MAGNESIUM SERPL-MCNC: 1.5 MG/DL (ref 1.6–2.4)
MCH RBC QN AUTO: 32.2 PG (ref 26.6–33)
MCHC RBC AUTO-ENTMCNC: 33 G/DL (ref 31.5–35.7)
MCV RBC AUTO: 97.8 FL (ref 79–97)
MICROCYTES BLD QL: ABNORMAL
MONOCYTES # BLD: 0 10*3/MM3 (ref 0.1–0.9)
NEUTROPHILS # BLD AUTO: 8.47 10*3/MM3 (ref 1.7–7)
NEUTROPHILS NFR BLD MANUAL: 45.9 % (ref 42.7–76)
NT-PROBNP SERPL-MCNC: 4294 PG/ML (ref 0–1800)
OVALOCYTES BLD QL SMEAR: ABNORMAL
PHOSPHATE SERPL-MCNC: 2.3 MG/DL (ref 2.5–4.5)
PLAT MORPH BLD: NORMAL
PLATELET # BLD AUTO: 100 10*3/MM3 (ref 140–450)
PMV BLD AUTO: 9.6 FL (ref 6–12)
POTASSIUM SERPL-SCNC: 2.5 MMOL/L (ref 3.5–5.2)
POTASSIUM SERPL-SCNC: 2.8 MMOL/L (ref 3.5–5.2)
PROCALCITONIN SERPL-MCNC: 4.86 NG/ML (ref 0–0.25)
PROT SERPL-MCNC: 5.8 G/DL (ref 6–8.5)
PROTHROMBIN TIME: 28.7 SECONDS (ref 11.7–14.2)
RBC # BLD AUTO: 3.66 10*6/MM3 (ref 3.77–5.28)
RHINOVIRUS RNA SPEC NAA+PROBE: NOT DETECTED
RSV RNA NPH QL NAA+NON-PROBE: NOT DETECTED
SARS-COV-2 RNA NPH QL NAA+NON-PROBE: NOT DETECTED
SMUDGE CELLS BLD QL SMEAR: ABNORMAL
SODIUM SERPL-SCNC: 139 MMOL/L (ref 136–145)
SODIUM SERPL-SCNC: 140 MMOL/L (ref 136–145)
TROPONIN T DELTA: -15 NG/L
TROPONIN T SERPL HS-MCNC: 71 NG/L
VARIANT LYMPHS NFR BLD MANUAL: 54.1 % (ref 19.6–45.3)
WBC NRBC COR # BLD AUTO: 18.46 10*3/MM3 (ref 3.4–10.8)
WHOLE BLOOD HOLD COAG: NORMAL
WHOLE BLOOD HOLD SPECIMEN: NORMAL

## 2024-08-22 PROCEDURE — 71045 X-RAY EXAM CHEST 1 VIEW: CPT

## 2024-08-22 PROCEDURE — 82330 ASSAY OF CALCIUM: CPT | Performed by: HOSPITALIST

## 2024-08-22 PROCEDURE — 85610 PROTHROMBIN TIME: CPT | Performed by: EMERGENCY MEDICINE

## 2024-08-22 PROCEDURE — 82948 REAGENT STRIP/BLOOD GLUCOSE: CPT

## 2024-08-22 PROCEDURE — 94664 DEMO&/EVAL PT USE INHALER: CPT

## 2024-08-22 PROCEDURE — 85025 COMPLETE CBC W/AUTO DIFF WBC: CPT | Performed by: EMERGENCY MEDICINE

## 2024-08-22 PROCEDURE — 84100 ASSAY OF PHOSPHORUS: CPT | Performed by: HOSPITALIST

## 2024-08-22 PROCEDURE — 94761 N-INVAS EAR/PLS OXIMETRY MLT: CPT

## 2024-08-22 PROCEDURE — 25010000002 FUROSEMIDE PER 20 MG: Performed by: EMERGENCY MEDICINE

## 2024-08-22 PROCEDURE — 83880 ASSAY OF NATRIURETIC PEPTIDE: CPT | Performed by: EMERGENCY MEDICINE

## 2024-08-22 PROCEDURE — 94799 UNLISTED PULMONARY SVC/PX: CPT

## 2024-08-22 PROCEDURE — 84484 ASSAY OF TROPONIN QUANT: CPT | Performed by: EMERGENCY MEDICINE

## 2024-08-22 PROCEDURE — 84145 PROCALCITONIN (PCT): CPT | Performed by: EMERGENCY MEDICINE

## 2024-08-22 PROCEDURE — 93005 ELECTROCARDIOGRAM TRACING: CPT | Performed by: EMERGENCY MEDICINE

## 2024-08-22 PROCEDURE — 93010 ELECTROCARDIOGRAM REPORT: CPT | Performed by: INTERNAL MEDICINE

## 2024-08-22 PROCEDURE — 0202U NFCT DS 22 TRGT SARS-COV-2: CPT | Performed by: EMERGENCY MEDICINE

## 2024-08-22 PROCEDURE — 25010000002 MAGNESIUM SULFATE 2 GM/50ML SOLUTION: Performed by: HOSPITALIST

## 2024-08-22 PROCEDURE — 36415 COLL VENOUS BLD VENIPUNCTURE: CPT

## 2024-08-22 PROCEDURE — 99291 CRITICAL CARE FIRST HOUR: CPT

## 2024-08-22 PROCEDURE — 80053 COMPREHEN METABOLIC PANEL: CPT | Performed by: EMERGENCY MEDICINE

## 2024-08-22 PROCEDURE — 83735 ASSAY OF MAGNESIUM: CPT | Performed by: EMERGENCY MEDICINE

## 2024-08-22 PROCEDURE — 85007 BL SMEAR W/DIFF WBC COUNT: CPT | Performed by: EMERGENCY MEDICINE

## 2024-08-22 RX ORDER — SODIUM CHLORIDE 0.9 % (FLUSH) 0.9 %
10 SYRINGE (ML) INJECTION EVERY 12 HOURS SCHEDULED
Status: DISCONTINUED | OUTPATIENT
Start: 2024-08-22 | End: 2024-08-25 | Stop reason: HOSPADM

## 2024-08-22 RX ORDER — MONTELUKAST SODIUM 10 MG/1
10 TABLET ORAL NIGHTLY
Status: DISCONTINUED | OUTPATIENT
Start: 2024-08-22 | End: 2024-08-25 | Stop reason: HOSPADM

## 2024-08-22 RX ORDER — SODIUM CHLORIDE 9 MG/ML
40 INJECTION, SOLUTION INTRAVENOUS AS NEEDED
Status: DISCONTINUED | OUTPATIENT
Start: 2024-08-22 | End: 2024-08-25 | Stop reason: HOSPADM

## 2024-08-22 RX ORDER — FUROSEMIDE 10 MG/ML
80 INJECTION INTRAMUSCULAR; INTRAVENOUS ONCE
Status: COMPLETED | OUTPATIENT
Start: 2024-08-22 | End: 2024-08-22

## 2024-08-22 RX ORDER — POTASSIUM CHLORIDE 750 MG/1
40 TABLET, FILM COATED, EXTENDED RELEASE ORAL ONCE
Status: COMPLETED | OUTPATIENT
Start: 2024-08-22 | End: 2024-08-22

## 2024-08-22 RX ORDER — WARFARIN SODIUM 2 MG/1
2 TABLET ORAL
Status: DISCONTINUED | OUTPATIENT
Start: 2024-08-23 | End: 2024-08-25 | Stop reason: HOSPADM

## 2024-08-22 RX ORDER — ALBUTEROL SULFATE 0.63 MG/3ML
0.63 SOLUTION RESPIRATORY (INHALATION) EVERY 6 HOURS PRN
Status: DISCONTINUED | OUTPATIENT
Start: 2024-08-22 | End: 2024-08-25 | Stop reason: HOSPADM

## 2024-08-22 RX ORDER — BUDESONIDE 0.5 MG/2ML
0.5 INHALANT ORAL 2 TIMES DAILY
Status: DISCONTINUED | OUTPATIENT
Start: 2024-08-22 | End: 2024-08-25 | Stop reason: HOSPADM

## 2024-08-22 RX ORDER — INSULIN LISPRO 100 [IU]/ML
2-9 INJECTION, SOLUTION INTRAVENOUS; SUBCUTANEOUS
Status: DISCONTINUED | OUTPATIENT
Start: 2024-08-22 | End: 2024-08-25 | Stop reason: HOSPADM

## 2024-08-22 RX ORDER — ACETAMINOPHEN 650 MG/1
650 SUPPOSITORY RECTAL EVERY 4 HOURS PRN
Status: DISCONTINUED | OUTPATIENT
Start: 2024-08-22 | End: 2024-08-25 | Stop reason: HOSPADM

## 2024-08-22 RX ORDER — DEXTROSE MONOHYDRATE 25 G/50ML
25 INJECTION, SOLUTION INTRAVENOUS
Status: DISCONTINUED | OUTPATIENT
Start: 2024-08-22 | End: 2024-08-25 | Stop reason: HOSPADM

## 2024-08-22 RX ORDER — CARVEDILOL 3.12 MG/1
3.12 TABLET ORAL 2 TIMES DAILY
Status: DISCONTINUED | OUTPATIENT
Start: 2024-08-22 | End: 2024-08-25 | Stop reason: HOSPADM

## 2024-08-22 RX ORDER — POLYETHYLENE GLYCOL 3350 17 G/17G
17 POWDER, FOR SOLUTION ORAL DAILY PRN
Status: DISCONTINUED | OUTPATIENT
Start: 2024-08-22 | End: 2024-08-25 | Stop reason: HOSPADM

## 2024-08-22 RX ORDER — IBUPROFEN 600 MG/1
1 TABLET ORAL
Status: DISCONTINUED | OUTPATIENT
Start: 2024-08-22 | End: 2024-08-25 | Stop reason: HOSPADM

## 2024-08-22 RX ORDER — FLUTICASONE PROPIONATE 50 MCG
1 SPRAY, SUSPENSION (ML) NASAL DAILY
Status: DISCONTINUED | OUTPATIENT
Start: 2024-08-22 | End: 2024-08-25 | Stop reason: HOSPADM

## 2024-08-22 RX ORDER — NITROGLYCERIN 0.4 MG/1
0.4 TABLET SUBLINGUAL
Status: DISCONTINUED | OUTPATIENT
Start: 2024-08-22 | End: 2024-08-25 | Stop reason: HOSPADM

## 2024-08-22 RX ORDER — WARFARIN SODIUM 4 MG/1
4 TABLET ORAL
Status: DISCONTINUED | OUTPATIENT
Start: 2024-08-22 | End: 2024-08-25 | Stop reason: HOSPADM

## 2024-08-22 RX ORDER — ROSUVASTATIN CALCIUM 20 MG/1
20 TABLET, COATED ORAL NIGHTLY
Status: DISCONTINUED | OUTPATIENT
Start: 2024-08-22 | End: 2024-08-25 | Stop reason: HOSPADM

## 2024-08-22 RX ORDER — SODIUM CHLORIDE 0.9 % (FLUSH) 0.9 %
10 SYRINGE (ML) INJECTION AS NEEDED
Status: DISCONTINUED | OUTPATIENT
Start: 2024-08-22 | End: 2024-08-25 | Stop reason: HOSPADM

## 2024-08-22 RX ORDER — BISACODYL 10 MG
10 SUPPOSITORY, RECTAL RECTAL DAILY PRN
Status: DISCONTINUED | OUTPATIENT
Start: 2024-08-22 | End: 2024-08-25 | Stop reason: HOSPADM

## 2024-08-22 RX ORDER — ONDANSETRON 2 MG/ML
4 INJECTION INTRAMUSCULAR; INTRAVENOUS EVERY 6 HOURS PRN
Status: DISCONTINUED | OUTPATIENT
Start: 2024-08-22 | End: 2024-08-25 | Stop reason: HOSPADM

## 2024-08-22 RX ORDER — ACETAMINOPHEN 160 MG/5ML
650 SOLUTION ORAL EVERY 4 HOURS PRN
Status: DISCONTINUED | OUTPATIENT
Start: 2024-08-22 | End: 2024-08-25 | Stop reason: HOSPADM

## 2024-08-22 RX ORDER — BISACODYL 5 MG/1
5 TABLET, DELAYED RELEASE ORAL DAILY PRN
Status: DISCONTINUED | OUTPATIENT
Start: 2024-08-22 | End: 2024-08-25 | Stop reason: HOSPADM

## 2024-08-22 RX ORDER — IPRATROPIUM BROMIDE AND ALBUTEROL SULFATE 2.5; .5 MG/3ML; MG/3ML
3 SOLUTION RESPIRATORY (INHALATION)
Status: DISCONTINUED | OUTPATIENT
Start: 2024-08-22 | End: 2024-08-25 | Stop reason: HOSPADM

## 2024-08-22 RX ORDER — ACETAMINOPHEN 325 MG/1
650 TABLET ORAL EVERY 4 HOURS PRN
Status: DISCONTINUED | OUTPATIENT
Start: 2024-08-22 | End: 2024-08-25 | Stop reason: HOSPADM

## 2024-08-22 RX ORDER — MAGNESIUM SULFATE HEPTAHYDRATE 40 MG/ML
2 INJECTION, SOLUTION INTRAVENOUS
Status: COMPLETED | OUTPATIENT
Start: 2024-08-22 | End: 2024-08-22

## 2024-08-22 RX ORDER — OXYBUTYNIN CHLORIDE 5 MG/1
5 TABLET, EXTENDED RELEASE ORAL 2 TIMES DAILY
Status: DISCONTINUED | OUTPATIENT
Start: 2024-08-22 | End: 2024-08-25 | Stop reason: HOSPADM

## 2024-08-22 RX ORDER — NICOTINE POLACRILEX 4 MG
15 LOZENGE BUCCAL
Status: DISCONTINUED | OUTPATIENT
Start: 2024-08-22 | End: 2024-08-25 | Stop reason: HOSPADM

## 2024-08-22 RX ORDER — ONDANSETRON 4 MG/1
4 TABLET, ORALLY DISINTEGRATING ORAL EVERY 6 HOURS PRN
Status: DISCONTINUED | OUTPATIENT
Start: 2024-08-22 | End: 2024-08-25 | Stop reason: HOSPADM

## 2024-08-22 RX ORDER — AMOXICILLIN 250 MG
2 CAPSULE ORAL 2 TIMES DAILY PRN
Status: DISCONTINUED | OUTPATIENT
Start: 2024-08-22 | End: 2024-08-25 | Stop reason: HOSPADM

## 2024-08-22 RX ORDER — CEPHALEXIN 500 MG/1
500 CAPSULE ORAL 3 TIMES DAILY
COMMUNITY
End: 2024-08-25 | Stop reason: HOSPADM

## 2024-08-22 RX ADMIN — ROSUVASTATIN CALCIUM 20 MG: 20 TABLET, FILM COATED ORAL at 20:44

## 2024-08-22 RX ADMIN — Medication 5 MG: at 20:44

## 2024-08-22 RX ADMIN — MAGNESIUM SULFATE HEPTAHYDRATE 2 G: 40 INJECTION, SOLUTION INTRAVENOUS at 20:47

## 2024-08-22 RX ADMIN — MAGNESIUM SULFATE HEPTAHYDRATE 2 G: 40 INJECTION, SOLUTION INTRAVENOUS at 17:11

## 2024-08-22 RX ADMIN — FUROSEMIDE 80 MG: 10 INJECTION, SOLUTION INTRAMUSCULAR; INTRAVENOUS at 14:23

## 2024-08-22 RX ADMIN — MONTELUKAST SODIUM 10 MG: 10 TABLET, FILM COATED ORAL at 20:44

## 2024-08-22 RX ADMIN — Medication 10 ML: at 20:45

## 2024-08-22 RX ADMIN — BUDESONIDE 0.5 MG: 0.5 INHALANT RESPIRATORY (INHALATION) at 21:22

## 2024-08-22 RX ADMIN — CALCIUM CARBONATE-VITAMIN D TAB 500 MG-200 UNIT 1 TABLET: 500-200 TAB at 20:44

## 2024-08-22 RX ADMIN — MAGNESIUM SULFATE HEPTAHYDRATE 2 G: 40 INJECTION, SOLUTION INTRAVENOUS at 18:32

## 2024-08-22 RX ADMIN — IPRATROPIUM BROMIDE AND ALBUTEROL SULFATE 3 ML: .5; 3 SOLUTION RESPIRATORY (INHALATION) at 21:21

## 2024-08-22 RX ADMIN — POTASSIUM CHLORIDE 40 MEQ: 750 TABLET, EXTENDED RELEASE ORAL at 17:11

## 2024-08-22 RX ADMIN — POTASSIUM CHLORIDE 40 MEQ: 750 TABLET, EXTENDED RELEASE ORAL at 13:27

## 2024-08-22 RX ADMIN — POTASSIUM CHLORIDE 40 MEQ: 750 TABLET, EXTENDED RELEASE ORAL at 20:44

## 2024-08-22 RX ADMIN — WARFARIN 4 MG: 4 TABLET ORAL at 20:44

## 2024-08-22 RX ADMIN — OXYBUTYNIN CHLORIDE 5 MG: 5 TABLET, EXTENDED RELEASE ORAL at 20:44

## 2024-08-22 NOTE — ED NOTES
Pt to ED from independent living at [facility] via [facility]. EMS called for SOA. Pt concerned for having covid. Pt reports productive cough and chills since yesterday afternoon. Pt uses 1L [place] O2 at night due to chronic SOA from partially paralyzed left lung. Pt hypotensive with EMS. Pt uses walker at baseline.

## 2024-08-22 NOTE — ED PROVIDER NOTES
EMERGENCY DEPARTMENT ENCOUNTER  Room Number:  33/33  PCP: Zenaida Suarez MD  Independent Historians: Patient, EMS who brought patient      HPI:  Chief Complaint: had concerns including Cough and Shortness of Breath.  Concern for COVID    A complete HPI/ROS/PMH/PSH/SH/FH are unobtainable due to:   Chronic or social conditions impacting patient care (Social Determinants of Health):       Context: Caitlyn Longoria is a 89 y.o. female with a medical history of diabetes, hypertension, hyperlipidemia, A-fib who presents to the ED c/o acute cough, shortness of breath.  Symptoms skin several days ago.  She has had some chills with myalgias and fatigue.  Also reports cough which is occasionally productive of yellow sputum.  She reports mild shortness of breath.  Denies chest pain.  Denies measured fever.  Patient concerned about possible COVID and wanted to be checked out for same.      Review of prior external notes (non-ED) -and- Review of prior external test results outside of this encounter:   I reviewed prior medical records including recent office note with primary care provider Amarilis Hawley.  Patient seen several days ago with chronic left shoulder pain.  Other chronic conditions include diabetes, hypertension and atrial fibrillation.      Prescription drug monitoring program review:         PAST MEDICAL HISTORY  Active Ambulatory Problems     Diagnosis Date Noted    Neck and shoulder pain 03/24/2017    Arthropathy of shoulder region 03/24/2017    Carpal tunnel syndrome of left wrist 03/24/2017    Chronic pain of both shoulders 06/27/2017    Chronic left shoulder pain 12/05/2017    Arthropathy of left shoulder 12/05/2017    Dysarthria 12/07/2017    DM II (diabetes mellitus, type II), controlled 12/07/2017    Paroxysmal atrial fibrillation 12/07/2017    HLD (hyperlipidemia) 12/07/2017    Chronic bronchitis 12/07/2017    Coronary artery disease involving native coronary artery of native heart with angina  pectoris 12/07/2017    CVA (cerebral vascular accident) 12/10/2017    HTN (hypertension) 01/14/2018    CKD (chronic kidney disease), stage III 01/14/2018    Chronic combined systolic and diastolic congestive heart failure 01/15/2018    Infection of prosthetic right knee joint 01/17/2018    Stasis dermatitis of right lower extremity due to peripheral venous hypertension 04/28/2018    Current use of long term anticoagulation 04/28/2018    Morbid obesity 02/08/2019    Acute respiratory failure with hypoxia 09/16/2019    Rhinovirus 02/11/2020    Asthma with acute exacerbation 02/11/2020    Acute respiratory failure with hypoxemia 02/12/2020    Viral pneumonia 04/23/2020    Hypoxia 08/29/2020    CLL (chronic lymphoid leukemia) in relapse 08/31/2020    Dyspnea 09/29/2020    Anticoagulated on Coumadin     Osteoporotic compression fracture of spine 09/30/2020    Pneumonia due to gram-negative bacteria 10/02/2020    Pneumonia due to infectious organism 09/29/2020    Bilateral carotid artery disease 10/28/2020    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    Cellulitis of right lower extremity 04/14/2024    Hypokalemia 04/15/2024    Nocturnal hypoxia 04/15/2024    COPD (chronic obstructive pulmonary disease) 04/15/2024     Resolved Ambulatory Problems     Diagnosis Date Noted    Hypernatremia 01/15/2018    Shortness of breath 04/28/2020     Past Medical History:   Diagnosis Date    Aortic calcification     Aortic regurgitation     Asthma     Atrial fibrillation     CAD (coronary artery disease)     CKD (chronic kidney disease) stage 3, GFR 30-59 ml/min     Disc degeneration, lumbar     Diverticulosis     DM type 2 (diabetes mellitus, type 2)     GERD (gastroesophageal reflux disease)     History of aneurysm     History of blood transfusion     History of fracture     History of heart attack     History of vitamin D deficiency     Hyperlipidemia     Hypertension     Leukemia     Mild mitral  regurgitation     Mitral annular calcification     Osteopenia     PAF (paroxysmal atrial fibrillation)     Peripheral neuropathy     Skin cancer     Sleep apnea     SSS (sick sinus syndrome)     Stroke (cerebrum)     TIA (transient ischemic attack) 2017    Tricuspid regurgitation          PAST SURGICAL HISTORY  Past Surgical History:   Procedure Laterality Date    BRONCHOSCOPY Bilateral 10/6/2020    Procedure: BRONCHOSCOPY with BILATERAL LUNG washings;  Surgeon: Juno Coburn MD;  Location: [facility] ENDOSCOPY;  Service: Pulmonary;  Laterality: Bilateral;  PRE: purulent bronchitis  POST: PURULENT BRONCHITIS    BRONCHOSCOPY Bilateral 10/9/2020    Procedure: BRONCHOSCOPY with washing;  Surgeon: Juno Coburn MD;  Location: [facility] ENDOSCOPY;  Service: Pulmonary;  Laterality: Bilateral;  pre/post - mucous plug      CARDIAC CATHETERIZATION      CARDIAC ELECTROPHYSIOLOGY PROCEDURE N/A 2/7/2020    Procedure: PPM generator change - dual  medtronic;  Surgeon: Kiel Field MD;  Location: [facility] CATH INVASIVE LOCATION;  Service: Cardiology;  Laterality: N/A;    CHOLECYSTECTOMY      CORONARY STENT PLACEMENT      ENDOSCOPY N/A 9/14/2022    Procedure: ESOPHAGOGASTRODUODENOSCOPY with 54fr main dilatation;  Surgeon: Enio Cota MD;  Location: [facility] ENDOSCOPY;  Service: Gastroenterology;  Laterality: N/A;  pre - dysphagia  post - s/p dilatation, watermelon stomach    HERNIA REPAIR      hital hernia    HYSTERECTOMY      PACEMAKER IMPLANTATION      REPLACEMENT TOTAL KNEE Bilateral          FAMILY HISTORY  Family History   Problem Relation Age of Onset    Heart disease Mother     Hypertension Mother     Colon polyps Mother     Heart disease Father     Hypertension Father     Stroke Father     Hypertension Brother     Heart disease Brother     Diabetes Niece     Colon cancer Sister     Hypertension Sister     Hypertension Daughter     Hypertension Son     Hypertension Maternal Aunt     Hypertension Maternal Grandmother      Hypertension Maternal Grandfather     Hypertension Paternal Grandmother     Hypertension Paternal Grandfather          SOCIAL HISTORY  Social History     Socioeconomic History    Marital status: Single   Tobacco Use    Smoking status: Never     Passive exposure: Never    Smokeless tobacco: Never    Tobacco comments:     caffeine use- soda   Vaping Use    Vaping status: Never Used   Substance and Sexual Activity    Alcohol use: Never    Drug use: No    Sexual activity: Defer         ALLERGIES  Accupril [quinapril hcl], Ahist [chlorpheniramine], Clarithromycin, Esomeprazole, Latex, Levalbuterol, Levocetirizine, Lipitor [atorvastatin], Omeprazole, Pravachol [pravastatin], Sulindac, Valdecoxib, Chlorcyclizine, Diclofenac sodium, Diphenhydramine, Lodine [etodolac], and Sulfa antibiotics      REVIEW OF SYSTEMS  Review of Systems   Constitutional:  Positive for chills and fatigue. Negative for fever.   Respiratory:  Positive for cough and shortness of breath.    Cardiovascular:  Negative for chest pain.   Musculoskeletal:  Positive for myalgias.   All other systems reviewed and are negative.    Included in HPI  All systems reviewed and negative except for those discussed in HPI.      PHYSICAL EXAM    I have reviewed the triage vital signs and nursing notes.    ED Triage Vitals [08/22/24 1118]   Temp Heart Rate Resp BP SpO2   98.9 °F (37.2 °C) 78 16 (!) 86/47 95 %      Temp src Heart Rate Source Patient Position BP Location FiO2 (%)   -- -- -- -- --       Physical Exam  GENERAL: Alert and well-appearing female who looks younger than stated age.  Triage vitals notable for O2 sats of 95% on room air.  Blood pressure slightly low at 86/47.  Pulse 78.  Temperature 98.9  SKIN: Multiple areas of ecchymosis of different ages and stages  HENT: Normocephalic, atraumatic  EYES: no scleral icterus  CV: regular rhythm, regular rate-systolic murmur appreciated  RESPIRATORY: normal effort, lungs clear-O2 sats mid 90s room  air  ABDOMEN: soft, nontender, nondistended  MUSCULOSKELETAL: no deformity-mild peripheral edema  NEURO: alert, moves all extremities, follows commands      LAB RESULTS  Recent Results (from the past 24 hour(s))   Comprehensive Metabolic Panel    Collection Time: 08/22/24 11:46 AM    Specimen: Blood   Result Value Ref Range    Glucose 128 (H) 65 - 99 mg/dL    BUN 17 8 - 23 mg/dL    Creatinine 0.76 0.57 - 1.00 mg/dL    Sodium 139 136 - 145 mmol/L    Potassium 2.8 (L) 3.5 - 5.2 mmol/L    Chloride 104 98 - 107 mmol/L    CO2 23.9 22.0 - 29.0 mmol/L    Calcium 8.7 8.6 - 10.5 mg/dL    Total Protein 5.8 (L) 6.0 - 8.5 g/dL    Albumin 3.6 3.5 - 5.2 g/dL    ALT (SGPT) 20 1 - 33 U/L    AST (SGOT) 26 1 - 32 U/L    Alkaline Phosphatase 82 39 - 117 U/L    Total Bilirubin 1.7 (H) 0.0 - 1.2 mg/dL    Globulin 2.2 gm/dL    A/G Ratio 1.6 g/dL    BUN/Creatinine Ratio 22.4 7.0 - 25.0    Anion Gap 11.1 5.0 - 15.0 mmol/L    eGFR 75.0 >60.0 mL/min/1.73   BNP    Collection Time: 08/22/24 11:46 AM    Specimen: Blood   Result Value Ref Range    proBNP 4,294.0 (H) 0.0 - 1,800.0 pg/mL   Single High Sensitivity Troponin T    Collection Time: 08/22/24 11:46 AM    Specimen: Blood   Result Value Ref Range    HS Troponin T 71 (C) <14 ng/L   Respiratory Panel PCR w/COVID-19(SARS-CoV-2) JACEY/EVONNE/LUÍS/PAD/COR/HELEN In-House, NP Swab in UTM/VTM, 2 HR TAT - Swab, Nasopharynx    Collection Time: 08/22/24 11:46 AM    Specimen: Nasopharynx; Swab   Result Value Ref Range    ADENOVIRUS, PCR Not Detected Not Detected    Coronavirus 229E Not Detected Not Detected    Coronavirus HKU1 Not Detected Not Detected    Coronavirus NL63 Not Detected Not Detected    Coronavirus OC43 Not Detected Not Detected    COVID19 Not Detected Not Detected - Ref. Range    Human Metapneumovirus Not Detected Not Detected    Human Rhinovirus/Enterovirus Not Detected Not Detected    Influenza A PCR Not Detected Not Detected    Influenza B PCR Not Detected Not Detected    Parainfluenza Virus  1 Not Detected Not Detected    Parainfluenza Virus 2 Not Detected Not Detected    Parainfluenza Virus 3 Not Detected Not Detected    Parainfluenza Virus 4 Not Detected Not Detected    RSV, PCR Not Detected Not Detected    Bordetella pertussis pcr Not Detected Not Detected    Bordetella parapertussis PCR Not Detected Not Detected    Chlamydophila pneumoniae PCR Not Detected Not Detected    Mycoplasma pneumo by PCR Not Detected Not Detected   Green Top (Gel)    Collection Time: 08/22/24 11:46 AM   Result Value Ref Range    Extra Tube Hold for add-ons.    Lavender Top    Collection Time: 08/22/24 11:46 AM   Result Value Ref Range    Extra Tube hold for add-on    Gold Top - SST    Collection Time: 08/22/24 11:46 AM   Result Value Ref Range    Extra Tube Hold for add-ons.    Light Blue Top    Collection Time: 08/22/24 11:46 AM   Result Value Ref Range    Extra Tube Hold for add-ons.    CBC Auto Differential    Collection Time: 08/22/24 11:46 AM    Specimen: Blood   Result Value Ref Range    WBC 18.46 (H) 3.40 - 10.80 10*3/mm3    RBC 3.66 (L) 3.77 - 5.28 10*6/mm3    Hemoglobin 11.8 (L) 12.0 - 15.9 g/dL    Hematocrit 35.8 34.0 - 46.6 %    MCV 97.8 (H) 79.0 - 97.0 fL    MCH 32.2 26.6 - 33.0 pg    MCHC 33.0 31.5 - 35.7 g/dL    RDW 12.9 12.3 - 15.4 %    RDW-SD 45.4 37.0 - 54.0 fl    MPV 9.6 6.0 - 12.0 fL    Platelets 100 (L) 140 - 450 10*3/mm3   Manual Differential    Collection Time: 08/22/24 11:46 AM    Specimen: Blood   Result Value Ref Range    Neutrophil % 45.9 42.7 - 76.0 %    Lymphocyte % 54.1 (H) 19.6 - 45.3 %    Monocyte % 0.0 (L) 5.0 - 12.0 %    Eosinophil % 0.0 (L) 0.3 - 6.2 %    Basophil % 0.0 0.0 - 1.5 %    Neutrophils Absolute 8.47 (H) 1.70 - 7.00 10*3/mm3    Lymphocytes Absolute 9.99 (H) 0.70 - 3.10 10*3/mm3    Monocytes Absolute 0.00 (L) 0.10 - 0.90 10*3/mm3    Eosinophils Absolute 0.00 0.00 - 0.40 10*3/mm3    Basophils Absolute 0.00 0.00 - 0.20 10*3/mm3    Anisocytosis Mod/2+ None Seen    Dacrocytes Mod/2+  None Seen    Microcytes Mod/2+ None Seen    Ovalocytes Mod/2+ None Seen    Smudge Cells Slight/1+ None Seen    Platelet Morphology Normal Normal   Magnesium    Collection Time: 08/22/24 11:46 AM    Specimen: Blood   Result Value Ref Range    Magnesium 1.5 (L) 1.6 - 2.4 mg/dL   ECG 12 Lead ED Triage Standing Order; SOA    Collection Time: 08/22/24 11:59 AM   Result Value Ref Range    QT Interval 428 ms    QTC Interval 494 ms         RADIOLOGY  XR Chest 1 View    Result Date: 8/22/2024  XR CHEST 1 VW-  INDICATION: Shortness of breath  COMPARISON: Chest radiographs dating back to 12/7/2017. CT abdomen pelvis 3/26/2014      Unchanged pronounced left hemidiaphragm elevation. Stable enlarged cardiac silhouette with biventricular pacemaker. No focal consolidation. No pleural effusion or pneumothorax. Evaluation for pleural effusion is limited on the left given hemidiaphragm elevation. No focal osseous abnormality.   This report was finalized on 8/22/2024 12:04 PM by Dr. Sergey Crump M.D on Workstation: [id number]         MEDICATIONS GIVEN IN ER  Medications   sodium chloride 0.9 % flush 10 mL (has no administration in time range)   Potassium Replacement - Follow Nurse / BPA Driven Protocol ( Does not apply Given 8/22/24 1338)   furosemide (LASIX) injection 80 mg (has no administration in time range)   potassium chloride (K-DUR,KLOR-CON) ER tablet 40 mEq (40 mEq Oral Given 8/22/24 1327)         ORDERS PLACED DURING THIS VISIT:  Orders Placed This Encounter   Procedures    Respiratory Panel PCR w/COVID-19(SARS-CoV-2) JACEY/EVONNE/LUÍS/PAD/COR/HELEN In-House, NP Swab in UTM/VTM, 2 HR TAT - Swab, Nasopharynx    XR Chest 1 View    [place] Draw    Comprehensive Metabolic Panel    BNP    Single High Sensitivity Troponin T    CBC Auto Differential    Manual Differential    High Sensitivity Troponin T 2Hr    Basic Metabolic Panel    Procalcitonin    Protime-INR    Magnesium    NPO Diet NPO Type: Strict NPO    Undress & Gown     Continuous Pulse Oximetry    Vital Signs    LHA (on-call MD unless specified) Details    Oxygen Therapy- Nasal Cannula; Titrate 1-6 LPM Per SpO2; 90 - 95%    ECG 12 Lead ED Triage Standing Order; SOA    Telemetry Scan    Telemetry Scan    Insert Peripheral IV    Inpatient Admission    CBC & Differential    Green Top (Gel)    Lavender Top    Gold Top - SST    Light Blue Top         OUTPATIENT MEDICATION MANAGEMENT:  Current Facility-Administered Medications Ordered in Epic   Medication Dose Route Frequency Provider Last Rate Last Admin    furosemide (LASIX) injection 80 mg  80 mg Intravenous Once Saul Abel MD        Potassium Replacement - Follow Nurse / BPA Driven Protocol   Does not apply Q4H Saul Abel MD   Given at 08/22/24 1338    sodium chloride 0.9 % flush 10 mL  10 mL Intravenous PRN Saul Abel MD         Current Outpatient Medications Ordered in Epic   Medication Sig Dispense Refill    acetaminophen (TYLENOL) 325 MG tablet Take 2 tablets by mouth Every 4 (Four) Hours As Needed for Mild Pain .      albuterol (PROVENTIL) (2.5 MG/3ML) 0.083% nebulizer solution Take 2.5 mg by nebulization Every 4 (Four) Hours.      albuterol sulfate  (90 Base) MCG/ACT inhaler Inhale 2 puffs Every 4 (Four) Hours As Needed for Wheezing. 1 inhaler 3    Benralizumab (FASENRA SC) Inject  under the skin into the appropriate area as directed. Gets one shot a month, next shot may 13 then one every 8 weeks      budesonide (PULMICORT) 0.5 MG/2ML nebulizer solution Take 2 mL by nebulization 2 (Two) Times a Day.      Calcium Carbonate-Vitamin D (calcium-vitamin D) 500-200 MG-UNIT tablet per tablet Take 1 tablet by mouth. (Patient not taking: Reported on 8/7/2024)      carvedilol (COREG) 3.125 MG tablet TAKE 1 TABLET TWICE A  tablet 3    Cetirizine HCl 10 MG capsule Take  by mouth.      fluticasone (FLONASE) 50 MCG/ACT nasal spray 1 spray into the nostril(s) as directed by provider Daily.      furosemide  (LASIX) 20 MG tablet Take 1 tablet by mouth As Needed (For weight gain of greater than 2 pounds in 1 day or 5 pounds in 1 week). 30 tablet 11    glipizide (GLUCOTROL) 5 MG tablet Take 1 tablet by mouth. Take one half tablet by mouth daily      melatonin 5 MG tablet tablet Take 1 tablet by mouth Every Night.      metFORMIN ER (GLUCOPHAGE-XR) 500 MG 24 hr tablet       montelukast (SINGULAIR) 10 MG tablet Take 1 tablet by mouth Every Night.      oxybutynin XL (DITROPAN-XL) 10 MG 24 hr tablet Take 1 tablet by mouth 2 (Two) Times a Day.      polyethyl glycol-propyl glycol (SYSTANE) 0.4-0.3 % solution ophthalmic solution (artificial tears) Administer 1 drop to both eyes Every 1 (One) Hour As Needed.      revefenacin (Yupelri) 175 MCG/3ML nebulizer solution Take  by nebulization Daily.      rosuvastatin (CRESTOR) 20 MG tablet Take 1 tablet by mouth Every Night.      warfarin (COUMADIN) 4 MG tablet TAKE ONE-HALF (1/2) TABLET ON SUNDAY AND FRIDAY AND TAKE ONE TABLET ALL OTHER DAYS OR AS DIRECTED 80 tablet 1         PROCEDURES  Critical Care    Performed by: Saul Abel MD  Authorized by: Saul Abel MD    Critical care provider statement:     Critical care time (minutes):  33    Critical care time was exclusive of:  Separately billable procedures and treating other patients    Critical care was necessary to treat or prevent imminent or life-threatening deterioration of the following conditions:  Cardiac failure and metabolic crisis    Critical care was time spent personally by me on the following activities:  Ordering and performing treatments and interventions, ordering and review of laboratory studies, ordering and review of radiographic studies, pulse oximetry, re-evaluation of patient's condition, review of old charts, development of treatment plan with patient or surrogate, evaluation of patient's response to treatment, examination of patient, interpretation of cardiac output measurements and obtaining history  from patient or surrogate              PROGRESS, DATA ANALYSIS, CONSULTS, AND MEDICAL DECISION MAKING  All labs have been independently interpreted by me.  All radiology studies have been reviewed by me. All EKG's have been independently viewed and interpreted by me.  Discussion below represents my analysis of pertinent findings related to patient's condition, differential diagnosis, treatment plan and final disposition.    Differential diagnosis includes but is not limited to viral URI, COVID, pneumonia, heart failure, anemia.  Patient's condition is exacerbated and complicated by her multiple chronic conditions including age 89, diabetes, A-fib, heart disease.      ED Course as of 08/22/24 1420   Thu Aug 22, 2024   1208 EKG independently interpreted by me    Time 11:59 AM  Paced rhythm 80  P waves-atrial paced P wave  QRS-ventricular paced with IVCD  ST, T waves-nonspecific changes  Not significant change when compared to 4/2024   [DB]   1359 Chest x-ray independently interpreted by me shows cardiomegaly, no obvious acute infiltrate.    Official reading by radiology is in agreement.  They do note chronic elevation of left hemidiaphragm. [DB]   1359 CBC notable for elevated white blood count of 18.5 worrisome for underlying bacterial infection.  Patient does have history of CLL and elevation could be related to underlying hematologic disease but consider worsening infection.  Will add procalcitonin, contact A about admission and this complicated patient with shortness of breath likely related to pneumonia and/or congestive heart failure. [DB]   1400 High-sensitivity troponin is elevated at 71 although patient is not having chest pain.  Suspect related to heart failure and respiratory illness, doubt acute coronary syndrome. [DB]   1404 Platelet count of 100 is a little lower than baseline and likely related to underlying CLL.  Patient not having active bleeding but will need to monitor. [DB]   1408 Patient with  pretty low potassium of 2.8.  Will replenish using potassium protocol. [DB]   1247 I discussed treatment and evaluation of this patient with Dr. Flako Yoo who will admit on behalf of [facility].    We discussed and considered antibiotics but will hold off for now pending results of procalcitonin.  If normal I would go against using antibiotics in my opinion. [DB]      ED Course User Index  [DB] Saul Abel MD             AS OF 14:20 EDT VITALS:    BP - 96/56  HR - 80  TEMP - 98.9 °F (37.2 °C)  O2 SATS - 95%    COMPLEXITY OF CARE  Complicated patient with multiple medical problems including heart failure, CLL and paralyzed left hemidiaphragm presents with shortness of breath, myalgias and concern for COVID.    Please see ED course above my independent valuation interpretation of ED testing to include EKG, x-ray and labs.    Notable abnormalities include elevated BNP worrisome for heart failure which I will treat with 80 mg of IV Lasix.  Patient was found to be significantly hypokalemic with potassium of 2.8 which was replenished using oral potassium.  She had elevated white blood cell count of 18K worrisome for underlying infection versus possible elevation related to CLL.    Considered antibiotics but will hold off pending results of procalcitonin.    I did speak with Dr. Flako Yoo who admit on behalf of [facility].  I went back to patient's room on a number of occasions to discuss results of testing, treatment plan and reassess evaluation.  She did remain persistently mildly hypotensive with blood pressures running around the 90s.      DIAGNOSIS  Final diagnoses:   Shortness of breath   Acute heart failure, unspecified heart failure type   Stage 3 chronic kidney disease, unspecified whether stage 3a or 3b CKD   Chronic obstructive pulmonary disease, unspecified COPD type   Hypokalemia         DISPOSITION  ED Disposition       ED Disposition   Decision to Admit    Condition   --    Comment   Level of Care: Telemetry  [5]   Diagnosis: Shortness of breath [786.05.ICD-9-CM]   Admitting Physician: ELIZABETH ROSALES [[id number]]   Attending Physician: ELIZABETH ROSALES [[id number]]   Certification: I Certify That Inpatient Hospital Services Are Medically Necessary For Greater Than 2 Midnights                  Please note that portions of this document were completed with a voice recognition program.    Note Disclaimer: At [facility], we believe that sharing information builds trust and better relationships. You are receiving this note because you recently visited [facility]. It is possible you will see health information before a provider has talked with you about it. This kind of information can be easy to misunderstand. To help you fully understand what it means for your health, we urge you to discuss this note with your provider.         Saul Abel MD  08/22/24 5036

## 2024-08-22 NOTE — H&P
"HISTORY AND PHYSICAL   [facility]        Patient Identification:  Name: Caitlyn Longoria  Age: 89 y.o.  Sex: female  :  1934  MRN: [id number]                     Primary Care Physician: Zenaida Suarez MD    Chief Complaint: Chills.    History of Present Illness:   Pleasant 89-year-old female with multitude of medical issues including CLL, chronic systolic and diastolic CHF.... She noticed an increasing cough starting about 2 days ago.  She notices mildly productive pursing in the morning but nonproductive throughout the rest the day.  Overall she felt at her baseline however until yesterday afternoon.  She states she felt like she was \"freezing\".  No cold sweats and no fevers.  She felt unusually tired and had mild generalized myalgias.  She states that her ears hurt.  She went upstairs and went to bed and apparently slept from about 5 PM to 8 AM.  She actually felt better this morning.  Ear pain and myalgias had resolved.  She was concerned that she might have COVID and presented emergency room for further evaluation.  On questioning she denies any shortness of air.  (It appears she had complained of shortness of air to ER staff earlier however.).  No chest pain.  No palpitations.  No lightheaded spells.          Past Medical History:  Past Medical History:   Diagnosis Date    Aortic calcification     mild, on echo 2017    Aortic regurgitation     Trace    Asthma     Atrial fibrillation     CAD (coronary artery disease)     Carpal tunnel syndrome of left wrist     Chronic combined systolic and diastolic congestive heart failure     CKD (chronic kidney disease) stage 3, GFR 30-59 ml/min     COPD (chronic obstructive pulmonary disease)     Coronary artery disease involving native coronary artery of native heart with angina pectoris     Disc degeneration, lumbar     Diverticulosis     DM type 2 (diabetes mellitus, type 2)     GERD (gastroesophageal reflux disease)     History " of aneurysm     right femoral artery s/p [facility]    History of blood transfusion     History of fracture     History of heart attack     History of vitamin D deficiency     Hyperlipidemia     Hypertension     Leukemia     Mild mitral regurgitation     Mitral annular calcification     12/8/2017- echo, moderate    Osteopenia     PAF (paroxysmal atrial fibrillation)     Peripheral neuropathy     Skin cancer     Left hand    Sleep apnea     bipap    SSS (sick sinus syndrome)     Stroke (cerebrum)     TIA (transient ischemic attack) 2017    Tricuspid regurgitation     Trace     Past Surgical History:  Past Surgical History:   Procedure Laterality Date    BRONCHOSCOPY Bilateral 10/6/2020    Procedure: BRONCHOSCOPY with BILATERAL LUNG washings;  Surgeon: Juno Coburn MD;  Location: [facility] ENDOSCOPY;  Service: Pulmonary;  Laterality: Bilateral;  PRE: purulent bronchitis  POST: PURULENT BRONCHITIS    BRONCHOSCOPY Bilateral 10/9/2020    Procedure: BRONCHOSCOPY with washing;  Surgeon: Juno Coburn MD;  Location: [facility] ENDOSCOPY;  Service: Pulmonary;  Laterality: Bilateral;  pre/post - mucous plug      CARDIAC CATHETERIZATION      CARDIAC ELECTROPHYSIOLOGY PROCEDURE N/A 2/7/2020    Procedure: PPM generator change - dual  medtronic;  Surgeon: Kiel Field MD;  Location: [facility] CATH INVASIVE LOCATION;  Service: Cardiology;  Laterality: N/A;    CHOLECYSTECTOMY      CORONARY STENT PLACEMENT      ENDOSCOPY N/A 9/14/2022    Procedure: ESOPHAGOGASTRODUODENOSCOPY with 54fr main dilatation;  Surgeon: Enio Cota MD;  Location: [facility] ENDOSCOPY;  Service: Gastroenterology;  Laterality: N/A;  pre - dysphagia  post - s/p dilatation, watermelon stomach    HERNIA REPAIR      hital hernia    HYSTERECTOMY      PACEMAKER IMPLANTATION      REPLACEMENT TOTAL KNEE Bilateral       Home Meds:  (Not in a hospital admission)      Allergies:  Allergies   Allergen Reactions    Accupril [Quinapril Hcl] Swelling, Other (See Comments),  GI Intolerance and Delirium     HA, constipation     Ahist [Chlorpheniramine] Nausea Only, Other (See Comments) and Dizziness     Headache, Blurred vision    Clarithromycin Nausea Only, Other (See Comments) and Mental Status Change     HA, Depression, Flushing    Esomeprazole GI Intolerance    Latex Other (See Comments)     Skin breakdown    Levalbuterol Swelling    Levocetirizine Diarrhea and GI Intolerance    Lipitor [Atorvastatin] Other (See Comments) and Myalgia     Dark urine    Omeprazole Nausea Only and Other (See Comments)     HA    Pravachol [Pravastatin] Nausea Only and GI Intolerance     Bloated, Constipation, HA    Sulindac Other (See Comments) and Myalgia     HA, joint pain, bruising    Valdecoxib Irritability    Chlorcyclizine Unknown - Low Severity    Diclofenac Sodium Unknown - Low Severity    Diphenhydramine Unknown - Low Severity    Lodine [Etodolac] Unknown - Low Severity    Sulfa Antibiotics Unknown - Low Severity     Immunizations:  Immunization History   Administered Date(s) Administered    COVID-19 (PFIZER) BIVALENT 12+YRS 04/03/2023    COVID-19 (PFIZER) Purple Cap Monovalent 01/17/2021, 02/05/2021, 09/04/2021    COVID-19 (UNSPECIFIED) 01/17/2021, 02/05/2021    COVID-19 F23 (PFIZER) 12YRS+ (COMIRNATY) 10/11/2023    Covid-19 (Pfizer) Gray Cap Monovalent 07/27/2022    Flu Vaccine Split Quad 09/01/2018, 09/01/2019    Fluad Quad 65+ 09/17/2020    Fluzone  >6mos 09/17/2020    Fluzone (or Fluarix & Flulaval for VFC) >6mos 09/17/2019    Fluzone High-Dose 65+YRS 09/27/2013, 10/14/2014, 09/12/2016, 10/04/2017, 09/20/2018, 09/17/2020, 10/04/2021    Fluzone High-Dose 65+yrs 10/03/2022    Fluzone Quad >6mos (Multi-dose) 09/17/2020    Pneumococcal Conjugate 13-Valent (PCV13) 12/12/2017    Pneumococcal Polysaccharide (PPSV23) 01/01/2009, 09/27/2013    Shingrix 11/06/2019, 02/28/2020, 02/28/2020    Td, Not Adsorbed 01/01/1993    Tdap 05/16/2022     Social History:   Social History     Social History  Narrative    Not on file     Social History     Tobacco Use    Smoking status: Never     Passive exposure: Never    Smokeless tobacco: Never    Tobacco comments:     caffeine use- soda   Substance Use Topics    Alcohol use: Never     Family History:  Family History   Problem Relation Age of Onset    Heart disease Mother     Hypertension Mother     Colon polyps Mother     Heart disease Father     Hypertension Father     Stroke Father     Hypertension Brother     Heart disease Brother     Diabetes Niece     Colon cancer Sister     Hypertension Sister     Hypertension Daughter     Hypertension Son     Hypertension Maternal Aunt     Hypertension Maternal Grandmother     Hypertension Maternal Grandfather     Hypertension Paternal Grandmother     Hypertension Paternal Grandfather         Review of Systems  Review of Systems   Constitutional:         As per history of present illness.   HENT:          As per history of present illness.   Eyes: Negative.    Respiratory:          As per history of present illness.   Cardiovascular:         As per history present illness.   Gastrointestinal: Negative.    Endocrine: Negative.    Genitourinary: Negative.    Musculoskeletal:         As per history of present illness.   Skin: Negative.    Allergic/Immunologic: Negative.    Neurological: Negative.    Hematological: Negative.    Psychiatric/Behavioral: Negative.         Objective:  T Max 24 hrs: Temp (24hrs), Av.9 °F (37.2 °C), Min:98.9 °F (37.2 °C), Max:98.9 °F (37.2 °C)    Vitals Ranges:   Temp:  [98.9 °F (37.2 °C)] 98.9 °F (37.2 °C)  Heart Rate:  [78-83] 80  Resp:  [16] 16  BP: ()/(47-63) 100/55      Exam:  Physical Exam  Constitutional:       General: She is not in acute distress.     Appearance: Normal appearance. She is obese. She is not ill-appearing, toxic-appearing or diaphoretic.   HENT:      Head: Normocephalic and atraumatic.      Right Ear: External ear normal.      Left Ear: External ear normal.      Nose:  Nose normal.      Mouth/Throat:      Mouth: Mucous membranes are moist.   Eyes:      General: No scleral icterus.        Right eye: No discharge.         Left eye: No discharge.      Extraocular Movements: Extraocular movements intact.      Conjunctiva/sclera: Conjunctivae normal.   Cardiovascular:      Rate and Rhythm: Normal rate and regular rhythm.      Heart sounds:      No friction rub. No gallop.   Pulmonary:      Effort: Pulmonary effort is normal.      Breath sounds: Normal breath sounds.   Abdominal:      General: Abdomen is flat. Bowel sounds are normal. There is no distension.      Palpations: Abdomen is soft. There is no mass.      Tenderness: There is no abdominal tenderness. There is no guarding or rebound.   Musculoskeletal:      Cervical back: Neck supple.      Right lower leg: No edema.      Left lower leg: No edema.   Skin:     General: Skin is warm and dry.   Neurological:      General: No focal deficit present.      Mental Status: She is alert and oriented to person, place, and time.   Psychiatric:         Mood and Affect: Mood normal.         Behavior: Behavior normal.         Thought Content: Thought content normal.         Judgment: Judgment normal.         Data Review:  All labs and radiology reviewed.    Assessment:  Viral illness: Presenting complaints appear most consistent with viral syndrome.  Viral panel negative.  Patient assured COVID 19 is negative.  Symptomatic treatment.  Hypokalemia: Replace and monitor.  Chronic systolic diastolic CHF: At this point I am not appreciating an exacerbation.  BNP was elevated which is concerning.  Clinically she does not appear fluid overloaded.  Possibly has already responded to the Lasix given in the emergency room.  Given that her blood pressure is running on the low side will hold off on further diuresis and bring her cardiologist in on the case.  Diabetes type 2: Monitor closely with sliding scale insulin.  Paroxysmal atrial fibrillation:  Continue rate control and anticoagulation.  Dual-chamber pacer in place.  Obesity class I.  COPD: Clinically stable.  As needed bronchodilators.  CLL: Stable.  Possible hypocalcemia: Albumin levels not available for a calculation.  Will request ionized calcium be added to resenting labs.    Plan:  Please see above.  Discussed with patient.  Discussed with ER provider.    Rd Yoo MD  8/22/2024  14:34 EDT    EMR Dragon/Transcription disclaimer:   Much of this encounter note is an electronic transcription/translation of spoken language to printed text. The electronic translation of spoken language may permit erroneous, or at times, nonsensical words or phrases to be inadvertently transcribed; Although I have reviewed the note for such errors, some may still exist.

## 2024-08-22 NOTE — ED NOTES
Nursing report ED to floor  Caitlyn Longoria  89 y.o.  female    HPI :  HPI (Adult)  Stated Reason for Visit: cough, soa  History Obtained From: EMS, patient    Chief Complaint  Chief Complaint   Patient presents with    Cough    Shortness of Breath       Admitting doctor:   Rd Yoo MD    Admitting diagnosis:   The primary encounter diagnosis was Shortness of breath. Diagnoses of Acute heart failure, unspecified heart failure type, Stage 3 chronic kidney disease, unspecified whether stage 3a or 3b CKD, Chronic obstructive pulmonary disease, unspecified COPD type, and Hypokalemia were also pertinent to this visit.    Code status:   Current Code Status       Date Active Code Status Order ID Comments User Context       Prior            Allergies:   Accupril [quinapril hcl], Ahist [chlorpheniramine], Clarithromycin, Esomeprazole, Latex, Levalbuterol, Levocetirizine, Lipitor [atorvastatin], Omeprazole, Pravachol [pravastatin], Sulindac, Valdecoxib, Chlorcyclizine, Diclofenac sodium, Diphenhydramine, Lodine [etodolac], and Sulfa antibiotics    Isolation:   No active isolations    Intake and Output    Intake/Output Summary (Last 24 hours) at 8/22/2024 1443  Last data filed at 8/22/2024 1113  Gross per 24 hour   Intake 300 ml   Output --   Net 300 ml       Weight:       08/22/24  1118   Weight: 82.6 kg (182 lb)       Most recent vitals:   Vitals:    08/22/24 1420 08/22/24 1421 08/22/24 1422 08/22/24 1423   BP:  104/50  100/55   Patient Position:       Pulse: 83 80 80 80   Resp:       Temp:       SpO2: 99% 97% 97%    Weight:       Height:           Active LDAs/IV Access:   Lines, Drains & Airways       Active LDAs       Name Placement date Placement time Site Days    Peripheral IV 08/22/24 1114 Anterior;Right Forearm 08/22/24  1114  Forearm  less than 1                    Labs (abnormal labs have a star):   Labs Reviewed   COMPREHENSIVE METABOLIC PANEL - Abnormal; Notable for the following components:        Result Value    Glucose 128 (*)     Potassium 2.8 (*)     Total Protein 5.8 (*)     Total Bilirubin 1.7 (*)     All other components within normal limits    Narrative:     GFR Normal >60  Chronic Kidney Disease <60  Kidney Failure <15    The GFR formula is only valid for adults with stable renal function between ages 18 and 70.   BNP (IN-HOUSE) - Abnormal; Notable for the following components:    proBNP 4,294.0 (*)     All other components within normal limits    Narrative:     This assay is used as an aid in the diagnosis of individuals suspected of having heart failure. It can be used as an aid in the diagnosis of acute decompensated heart failure (ADHF) in patients presenting with signs and symptoms of ADHF to the emergency department (ED). In addition, NT-proBNP of <300 pg/mL indicates ADHF is not likely.    Age Range Result Interpretation  NT-proBNP Concentration (pg/mL:      <50             Positive            >450                   Gray                 300-450                    Negative             <300    50-75           Positive            >900                  Gray                300-900                  Negative            <300      >75             Positive            >1800                  Gray                300-1800                  Negative            <300   SINGLE HS TROPONIN T - Abnormal; Notable for the following components:    HS Troponin T 71 (*)     All other components within normal limits    Narrative:     High Sensitive Troponin T Reference Range:  <14.0 ng/L- Negative Female for AMI  <22.0 ng/L- Negative Male for AMI  >=14 - Abnormal Female indicating possible myocardial injury.  >=22 - Abnormal Male indicating possible myocardial injury.   Clinicians would have to utilize clinical acumen, EKG, Troponin, and serial changes to determine if it is an Acute Myocardial Infarction or myocardial injury due to an underlying chronic condition.        CBC WITH AUTO DIFFERENTIAL - Abnormal; Notable  "for the following components:    WBC 18.46 (*)     RBC 3.66 (*)     Hemoglobin 11.8 (*)     MCV 97.8 (*)     Platelets 100 (*)     All other components within normal limits   MANUAL DIFFERENTIAL - Abnormal; Notable for the following components:    Lymphocyte % 54.1 (*)     Monocyte % 0.0 (*)     Eosinophil % 0.0 (*)     Neutrophils Absolute 8.47 (*)     Lymphocytes Absolute 9.99 (*)     Monocytes Absolute 0.00 (*)     All other components within normal limits   PROCALCITONIN - Abnormal; Notable for the following components:    Procalcitonin 4.86 (*)     All other components within normal limits    Narrative:     As a Marker for Sepsis (Non-Neonates):    1. <0.5 ng/mL represents a low risk of severe sepsis and/or septic shock.  2. >2 ng/mL represents a high risk of severe sepsis and/or septic shock.    As a Marker for Lower Respiratory Tract Infections that require antibiotic therapy:    PCT on Admission    Antibiotic Therapy       6-12 Hrs later    >0.5                Strongly Recommended  >0.25 - <0.5        Recommended   0.1 - 0.25          Discouraged              Remeasure/reassess PCT  <0.1                Strongly Discouraged     Remeasure/reassess PCT    As 28 day mortality risk marker: \"Change in Procalcitonin Result\" (>80% or <=80%) if Day 0 (or Day 1) and Day 4 values are available. Refer to http://www.[facility]-pct-calculator.com    Change in PCT <=80%  A decrease of PCT levels below or equal to 80% defines a positive change in PCT test result representing a higher risk for 28-day all-cause mortality of patients diagnosed with severe sepsis for septic shock.    Change in PCT >80%  A decrease of PCT levels of more than 80% defines a negative change in PCT result representing a lower risk for 28-day all-cause mortality of patients diagnosed with severe sepsis or septic shock.      PROTIME-INR - Abnormal; Notable for the following components:    Protime 28.7 (*)     INR 2.68 (*)     All other components within " normal limits   MAGNESIUM - Abnormal; Notable for the following components:    Magnesium 1.5 (*)     All other components within normal limits   RESPIRATORY PANEL PCR W/ COVID-19 (SARS-COV-2), NP SWAB IN UTM/VTP, 2 HR TAT - Normal    Narrative:     In the setting of a positive respiratory panel with a viral infection PLUS a negative procalcitonin without other underlying concern for bacterial infection, consider observing off antibiotics or discontinuation of antibiotics and continue supportive care. If the respiratory panel is positive for atypical bacterial infection (Bordetella pertussis, Chlamydophila pneumoniae, or Mycoplasma pneumoniae), consider antibiotic de-escalation to target atypical bacterial infection.   RAINBOW DRAW    Narrative:     The following orders were created for panel order [place] Draw.  Procedure                               Abnormality         Status                     ---------                               -----------         ------                     Green Top (Gel)[[id number]]                                  Final result               Lavender Top[[id number]]                                     Final result               Gold Top - SST[[id number]]                                   Final result               Light Blue Top[[id number]]                                   Final result                 Please view results for these tests on the individual orders.   HIGH SENSITIVITIY TROPONIN T 2HR   BASIC METABOLIC PANEL   CBC AND DIFFERENTIAL    Narrative:     The following orders were created for panel order CBC & Differential.  Procedure                               Abnormality         Status                     ---------                               -----------         ------                     CBC Auto Differential[[id number]]        Abnormal            Final result                 Please view results for these tests on the individual orders.   GREEN TOP   LAVENDER TOP   GOLD TOP - SST    LIGHT BLUE TOP       EKG:   ECG 12 Lead ED Triage Standing Order; SOA   Preliminary Result   HEART RATE=80  bpm   RR Kxjahjdv=775  ms   TN Interval=61  ms   P Horizontal Axis=  deg   P Front Axis=  deg   QRSD Drbckkdj=210  ms   QT Gcbfxrqa=641  ms   PVaH=478  ms   QRS Axis=-69  deg   T Wave Axis=113  deg   - ABNORMAL ECG -   A-V dual-paced rhythm with some inhibition   No further analysis attempted due to paced rhythm   Date and Time of Study:2024-08-22 11:59:07      Telemetry Scan   Final Result      Telemetry Scan   Final Result          Meds given in ED:   Medications   sodium chloride 0.9 % flush 10 mL (has no administration in time range)   Potassium Replacement - Follow Nurse / BPA Driven Protocol ( Does not apply Given 8/22/24 1338)   potassium chloride (K-DUR,KLOR-CON) ER tablet 40 mEq (40 mEq Oral Given 8/22/24 1327)   furosemide (LASIX) injection 80 mg (80 mg Intravenous Given 8/22/24 1423)       Imaging results:  XR Chest 1 View    Result Date: 8/22/2024  Unchanged pronounced left hemidiaphragm elevation. Stable enlarged cardiac silhouette with biventricular pacemaker. No focal consolidation. No pleural effusion or pneumothorax. Evaluation for pleural effusion is limited on the left given hemidiaphragm elevation. No focal osseous abnormality.   This report was finalized on 8/22/2024 12:04 PM by Dr. Sergey Crump M.D on Workstation: [id number]       Ambulatory status:   - has not ambulated in ED    Social issues:   Social History     Socioeconomic History    Marital status: Single   Tobacco Use    Smoking status: Never     Passive exposure: Never    Smokeless tobacco: Never    Tobacco comments:     caffeine use- soda   Vaping Use    Vaping status: Never Used   Substance and Sexual Activity    Alcohol use: Never    Drug use: No    Sexual activity: Defer       Peripheral Neurovascular  Peripheral Neurovascular (Adult)  Peripheral Neurovascular WDL: WDL    Neuro Cognitive  Neuro Cognitive  (Adult)  Cognitive/Neuro/Behavioral WDL: WDL    Learning  Learning Assessment (Adult)  Learning Readiness and Ability: no barriers identified  Education Provided  Person Taught: patient    Respiratory  Respiratory WDL  Respiratory WDL: .WDL except, cough  Cough Frequency: frequent    Abdominal Pain       Pain Assessments  Pain (Adult)  (0-10) Pain Rating: Rest: 5  Pain Side/Orientation: generalized    NIH Stroke Scale       Petra Bonilla RN  08/22/24 14:43 EDT

## 2024-08-22 NOTE — PROGRESS NOTES
[facility] Clinical Pharmacy Services: Warfarin Dosing/Monitoring Consult    Caitlyn Longoria is a 89 y.o. female, estimated creatinine clearance is 58.5 mL/min (by C-G formula based on SCr of 0.66 mg/dL). weighing 81.6 kg (179 lb 14.3 oz).    Results from last 7 days   Lab Units 08/22/24  1146 08/19/24  1323   INR  2.68*  --    HEMOGLOBIN g/dL 11.8* 13.1   HEMATOCRIT % 35.8 42.4   PLATELETS 10*3/mm3 100* 131*     Prior to admission anticoagulation: warfarin 2 mg Fri & Sun; 4 mg all other days    Hospital Anticoagulation:  Consulting provider: Naila  Start date: \[facility] [place] [facility]\""  Indication: A Fib - requiring full anticoagulation  Target INR: 2 - 3  Expected duration: tbd   Bridge Therapy: No      Potential food or drug interactions: none at this time    Education complete?/Date: N/A; home medication    Assessment/Plan:  Dose: INR is therapeutic - will resume home regimen starting with 4 mg tonight  Monitor for any signs or symptoms of bleeding  Follow up daily INRs and dose adjustments    Pharmacy will continue to follow until discharge or discontinuation of warfarin.     Bella Wright [facility]  Clinical Pharmacist

## 2024-08-22 NOTE — PLAN OF CARE
Goal Outcome Evaluation:  Plan of Care Reviewed With: patient           Outcome Evaluation: Pt admitted to unit.

## 2024-08-23 LAB
ALBUMIN SERPL-MCNC: 3.4 G/DL (ref 3.5–5.2)
ALBUMIN/GLOB SERPL: 1.5 G/DL
ALP SERPL-CCNC: 91 U/L (ref 39–117)
ALT SERPL W P-5'-P-CCNC: 15 U/L (ref 1–33)
ANION GAP SERPL CALCULATED.3IONS-SCNC: 10 MMOL/L (ref 5–15)
AST SERPL-CCNC: 22 U/L (ref 1–32)
BILIRUB SERPL-MCNC: 1.4 MG/DL (ref 0–1.2)
BUN SERPL-MCNC: 18 MG/DL (ref 8–23)
BUN/CREAT SERPL: 22.8 (ref 7–25)
CA-I SERPL ISE-MCNC: 1.2 MMOL/L (ref 1.15–1.35)
CALCIUM SPEC-SCNC: 8.8 MG/DL (ref 8.6–10.5)
CHLORIDE SERPL-SCNC: 103 MMOL/L (ref 98–107)
CO2 SERPL-SCNC: 27 MMOL/L (ref 22–29)
CREAT SERPL-MCNC: 0.79 MG/DL (ref 0.57–1)
DEPRECATED RDW RBC AUTO: 47.8 FL (ref 37–54)
EGFRCR SERPLBLD CKD-EPI 2021: 71.6 ML/MIN/1.73
ERYTHROCYTE [DISTWIDTH] IN BLOOD BY AUTOMATED COUNT: 13.1 % (ref 12.3–15.4)
GLOBULIN UR ELPH-MCNC: 2.3 GM/DL
GLUCOSE BLDC GLUCOMTR-MCNC: 109 MG/DL (ref 70–130)
GLUCOSE BLDC GLUCOMTR-MCNC: 169 MG/DL (ref 70–130)
GLUCOSE BLDC GLUCOMTR-MCNC: 181 MG/DL (ref 70–130)
GLUCOSE BLDC GLUCOMTR-MCNC: 212 MG/DL (ref 70–130)
GLUCOSE SERPL-MCNC: 108 MG/DL (ref 65–99)
HBA1C MFR BLD: 6.8 % (ref 4.8–5.6)
HCT VFR BLD AUTO: 38 % (ref 34–46.6)
HGB BLD-MCNC: 12.1 G/DL (ref 12–15.9)
INR PPP: 2.02 (ref 0.9–1.1)
LYMPHOCYTES # BLD MANUAL: 7.42 10*3/MM3 (ref 0.7–3.1)
LYMPHOCYTES NFR BLD MANUAL: 4 % (ref 5–12)
MAGNESIUM SERPL-MCNC: 2.4 MG/DL (ref 1.6–2.4)
MCH RBC QN AUTO: 31.3 PG (ref 26.6–33)
MCHC RBC AUTO-ENTMCNC: 31.8 G/DL (ref 31.5–35.7)
MCV RBC AUTO: 98.4 FL (ref 79–97)
MONOCYTES # BLD: 0.65 10*3/MM3 (ref 0.1–0.9)
NEUTROPHILS # BLD AUTO: 8.07 10*3/MM3 (ref 1.7–7)
NEUTROPHILS NFR BLD MANUAL: 50 % (ref 42.7–76)
PHOSPHATE SERPL-MCNC: 2.3 MG/DL (ref 2.5–4.5)
PLAT MORPH BLD: NORMAL
PLATELET # BLD AUTO: 114 10*3/MM3 (ref 140–450)
PMV BLD AUTO: 10.1 FL (ref 6–12)
POTASSIUM SERPL-SCNC: 3.8 MMOL/L (ref 3.5–5.2)
PROT SERPL-MCNC: 5.7 G/DL (ref 6–8.5)
PROTHROMBIN TIME: 23.1 SECONDS (ref 11.7–14.2)
QT INTERVAL: 428 MS
QTC INTERVAL: 494 MS
RBC # BLD AUTO: 3.86 10*6/MM3 (ref 3.77–5.28)
RBC MORPH BLD: NORMAL
SODIUM SERPL-SCNC: 140 MMOL/L (ref 136–145)
TSH SERPL DL<=0.05 MIU/L-ACNC: 1.29 UIU/ML (ref 0.27–4.2)
VARIANT LYMPHS NFR BLD MANUAL: 46 % (ref 19.6–45.3)
WBC MORPH BLD: NORMAL
WBC NRBC COR # BLD AUTO: 16.14 10*3/MM3 (ref 3.4–10.8)

## 2024-08-23 PROCEDURE — 94799 UNLISTED PULMONARY SVC/PX: CPT

## 2024-08-23 PROCEDURE — 83036 HEMOGLOBIN GLYCOSYLATED A1C: CPT | Performed by: HOSPITALIST

## 2024-08-23 PROCEDURE — 36415 COLL VENOUS BLD VENIPUNCTURE: CPT | Performed by: HOSPITALIST

## 2024-08-23 PROCEDURE — 83735 ASSAY OF MAGNESIUM: CPT | Performed by: HOSPITALIST

## 2024-08-23 PROCEDURE — 80053 COMPREHEN METABOLIC PANEL: CPT | Performed by: HOSPITALIST

## 2024-08-23 PROCEDURE — 84100 ASSAY OF PHOSPHORUS: CPT | Performed by: HOSPITALIST

## 2024-08-23 PROCEDURE — 82948 REAGENT STRIP/BLOOD GLUCOSE: CPT

## 2024-08-23 PROCEDURE — 63710000001 INSULIN LISPRO (HUMAN) PER 5 UNITS: Performed by: HOSPITALIST

## 2024-08-23 PROCEDURE — 99222 1ST HOSP IP/OBS MODERATE 55: CPT | Performed by: NURSE PRACTITIONER

## 2024-08-23 PROCEDURE — 85610 PROTHROMBIN TIME: CPT | Performed by: HOSPITALIST

## 2024-08-23 PROCEDURE — 85025 COMPLETE CBC W/AUTO DIFF WBC: CPT | Performed by: HOSPITALIST

## 2024-08-23 PROCEDURE — 94664 DEMO&/EVAL PT USE INHALER: CPT

## 2024-08-23 PROCEDURE — 82330 ASSAY OF CALCIUM: CPT | Performed by: HOSPITALIST

## 2024-08-23 PROCEDURE — 94761 N-INVAS EAR/PLS OXIMETRY MLT: CPT

## 2024-08-23 PROCEDURE — 84443 ASSAY THYROID STIM HORMONE: CPT | Performed by: HOSPITALIST

## 2024-08-23 RX ADMIN — Medication 5 MG: at 20:39

## 2024-08-23 RX ADMIN — WARFARIN 2 MG: 2 TABLET ORAL at 17:57

## 2024-08-23 RX ADMIN — BUDESONIDE 0.5 MG: 0.5 INHALANT RESPIRATORY (INHALATION) at 21:16

## 2024-08-23 RX ADMIN — CALCIUM CARBONATE-VITAMIN D TAB 500 MG-200 UNIT 1 TABLET: 500-200 TAB at 20:39

## 2024-08-23 RX ADMIN — INSULIN LISPRO 2 UNITS: 100 INJECTION, SOLUTION INTRAVENOUS; SUBCUTANEOUS at 20:38

## 2024-08-23 RX ADMIN — INSULIN LISPRO 4 UNITS: 100 INJECTION, SOLUTION INTRAVENOUS; SUBCUTANEOUS at 12:51

## 2024-08-23 RX ADMIN — Medication 10 ML: at 08:51

## 2024-08-23 RX ADMIN — INSULIN LISPRO 2 UNITS: 100 INJECTION, SOLUTION INTRAVENOUS; SUBCUTANEOUS at 17:57

## 2024-08-23 RX ADMIN — IPRATROPIUM BROMIDE AND ALBUTEROL SULFATE 3 ML: .5; 3 SOLUTION RESPIRATORY (INHALATION) at 07:03

## 2024-08-23 RX ADMIN — CARVEDILOL 3.12 MG: 3.12 TABLET, FILM COATED ORAL at 00:05

## 2024-08-23 RX ADMIN — Medication 10 ML: at 20:39

## 2024-08-23 RX ADMIN — BUDESONIDE 0.5 MG: 0.5 INHALANT RESPIRATORY (INHALATION) at 07:04

## 2024-08-23 RX ADMIN — IPRATROPIUM BROMIDE AND ALBUTEROL SULFATE 3 ML: .5; 3 SOLUTION RESPIRATORY (INHALATION) at 15:47

## 2024-08-23 RX ADMIN — CARVEDILOL 3.12 MG: 3.12 TABLET, FILM COATED ORAL at 20:44

## 2024-08-23 RX ADMIN — CARVEDILOL 3.12 MG: 3.12 TABLET, FILM COATED ORAL at 08:50

## 2024-08-23 RX ADMIN — MONTELUKAST SODIUM 10 MG: 10 TABLET, FILM COATED ORAL at 20:39

## 2024-08-23 RX ADMIN — IPRATROPIUM BROMIDE AND ALBUTEROL SULFATE 3 ML: .5; 3 SOLUTION RESPIRATORY (INHALATION) at 11:44

## 2024-08-23 RX ADMIN — IPRATROPIUM BROMIDE AND ALBUTEROL SULFATE 3 ML: .5; 3 SOLUTION RESPIRATORY (INHALATION) at 21:13

## 2024-08-23 RX ADMIN — ROSUVASTATIN CALCIUM 20 MG: 20 TABLET, FILM COATED ORAL at 20:39

## 2024-08-23 RX ADMIN — OXYBUTYNIN CHLORIDE 5 MG: 5 TABLET, EXTENDED RELEASE ORAL at 08:50

## 2024-08-23 RX ADMIN — OXYBUTYNIN CHLORIDE 5 MG: 5 TABLET, EXTENDED RELEASE ORAL at 20:39

## 2024-08-23 RX ADMIN — CALCIUM CARBONATE-VITAMIN D TAB 500 MG-200 UNIT 1 TABLET: 500-200 TAB at 08:50

## 2024-08-23 NOTE — PROGRESS NOTES
[facility]     LOS: 1 day     Subjective:    CC:Cough and Shortness of Breath    DIET:  Diet Order   Procedures    Diet: Regular/House, Diabetic, Cardiac; Healthy Heart (2-3 Na+); Consistent Carbohydrate; Fluid Consistency: Thin (IDDSI 0)   Denies any chest pain or shortness of air      Objective:    Vital Signs:  Temp:  [97.8 °F (36.6 °C)-99.1 °F (37.3 °C)] 99.1 °F (37.3 °C)  Heart Rate:  [78-83] 81  Resp:  [16-20] 18  BP: ()/(47-66) 108/61    SpO2:  [93 %-100 %] 94 %  on   ;   Device (Oxygen Therapy): room air  Body mass index is 32.53 kg/m².    Physical Exam  HENT:      Head: Normocephalic and atraumatic.   Cardiovascular:      Heart sounds: No murmur heard.     No friction rub.   Pulmonary:      Effort: Pulmonary effort is normal.      Breath sounds: Normal breath sounds.   Abdominal:      General: Bowel sounds are normal. There is no distension.      Palpations: Abdomen is soft.      Tenderness: There is no abdominal tenderness.   Skin:     General: Skin is warm and dry.   Psychiatric:         Mood and Affect: Mood normal.         Behavior: Behavior normal.         Results Review:    Glucose   Date Value Ref Range Status   08/23/2024 108 (H) 65 - 99 mg/dL Final   08/22/2024 59 (L) 65 - 99 mg/dL Final   08/22/2024 128 (H) 65 - 99 mg/dL Final     Results from last 7 days   Lab Units 08/23/24  0614   WBC 10*3/mm3 16.14*   HEMOGLOBIN g/dL 12.1   HEMATOCRIT % 38.0   PLATELETS 10*3/mm3 114*     Results from last 7 days   Lab Units 08/23/24  0614   SODIUM mmol/L 140   POTASSIUM mmol/L 3.8   CHLORIDE mmol/L 103   CO2 mmol/L 27.0   BUN mg/dL 18   CREATININE mg/dL 0.79   CALCIUM mg/dL 8.8   BILIRUBIN mg/dL 1.4*   ALK PHOS U/L 91   ALT (SGPT) U/L 15   AST (SGOT) U/L 22   GLUCOSE mg/dL 108*     Results from last 7 days   Lab Units 08/23/24  0614   INR  2.02*     Results from last 7 days   Lab Units 08/23/24  0614   MAGNESIUM mg/dL 2.4     Results from last 7 days   Lab Units  "08/22/24  1419 08/22/24  1146   HSTROP T ng/L 56* 71*     Cultures:  No results found for: \"BLOODCX\", \"URINECX\", \"WOUNDCX\", \"MRSACX\", \"RESPCX\", \"STOOLCX\"    I have reviewed daily medications and changes in CPOE    Scheduled meds  budesonide, 0.5 mg, Nebulization, BID  calcium 500 mg vitamin D 5 mcg (200 UT), 1 tablet, Oral, BID  carvedilol, 3.125 mg, Oral, BID  fluticasone, 1 spray, Nasal, Daily  insulin lispro, 2-9 Units, Subcutaneous, 4x Daily AC & at Bedtime  ipratropium-albuterol, 3 mL, Nebulization, 4x Daily - RT  melatonin, 5 mg, Oral, Nightly  montelukast, 10 mg, Oral, Nightly  oxybutynin XL, 5 mg, Oral, BID  rosuvastatin, 20 mg, Oral, Nightly  sodium chloride, 10 mL, Intravenous, Q12H  warfarin, 2 mg, Oral, Once per day on Sunday Friday  warfarin, 4 mg, Oral, Once per day on Monday Tuesday Wednesday Thursday Saturday        Pharmacy to dose warfarin,       PRN meds    acetaminophen **OR** acetaminophen **OR** acetaminophen    albuterol    senna-docusate sodium **AND** polyethylene glycol **AND** bisacodyl **AND** bisacodyl    Calcium Replacement - Follow Nurse / BPA Driven Protocol    dextrose    dextrose    glucagon (human recombinant)    Glycerin-Hypromellose-    Magnesium Standard Dose Replacement - Follow Nurse / BPA Driven Protocol    nitroglycerin    ondansetron ODT **OR** ondansetron    Pharmacy to dose warfarin    Phosphorus Replacement - Follow Nurse / BPA Driven Protocol    Potassium Replacement - Follow Nurse / BPA Driven Protocol    sodium chloride    sodium chloride    sodium chloride        Shortness of breath        Assessment/Plan:    89-year-old female with CLL and CHF who comes in with chills and cough    Viral illness: Patient is had increased cough, could be related to viral illness but viral PCR is negative    Hypokalemia: Very low on admission at 2.5, now up to 3.8 will recheck in a.m.    Chronic systolic diastolic CHF: Appears to be euvolemic today, she did receive Lasix in the " emergency room, cardiology consulted    Diabetes type 2: Monitor closely with sliding scale insulin.  -Blood sugars good    Paroxysmal atrial fibrillation: Continue rate control and anticoagulation.  Dual-chamber pacer in place.    Obesity class I.    COPD: Clinically stable.  As needed bronchodilators.    CLL: Stable.  White blood cell count is stable    Possible hypocalcemia: Calcium is returned to normal      Paco Salinas MD  08/23/24  09:57 EDT

## 2024-08-23 NOTE — PLAN OF CARE
Goal Outcome Evaluation:      Pt resting in bed quietly. Denies pain. Turns self. Room air.

## 2024-08-23 NOTE — CASE MANAGEMENT/SOCIAL WORK
Continued Stay Note  [facility]     Patient Name: Caitlyn Longoria  MRN: [id number]  Today's Date: 8/23/2024    Admit Date: 8/22/2024    Plan: Plan home.  KENNETH Recinos RN   Discharge Plan       Row Name 08/23/24 0909       Plan    Plan Plan home.  KENNETH Recinos RN    Patient/Family in Agreement with Plan yes    Plan Comments FACE SHEET VERIFIED/ IM LETTER SIGNED. Spoke with pt at bedside. Pt's PCP is Dr. Zenaida Suarez.  Pt lives at [facility] in a third floor apartment with elevator access.  Pt is independent with ADLs. Pt has a Bi PAP and home O2 provided by [facility].  Pt also has a grab bar, nebulizer, rollator, shower chair, and rolling walker for home use.  Pt gets her prescriptions at [facility]  ([address]).  Pt denies any issues affording medications. Pt is not current with .  Pt has been in [facility] for skilled care. Pt denies any discharge needs at present. Pt states her friends and niece ( Zoey Adair ([phone]) will assist her at home and transport her home. Plan home.  KENNETH Recinos RN    Final Discharge Disposition Code --                   Discharge Codes    No documentation.                 Expected Discharge Date and Time       Expected Discharge Date Expected Discharge Time    Aug 26, 2024               Narda Recinos RN

## 2024-08-23 NOTE — CONSULTS
Patient Name: Caitlyn Longoria  :1934  89 y.o.    Date of Admission: 2024  Date of Consultation:  24  Encounter Provider: KRISTIN Noriega  Place of Service: [facility]  Referring Provider: Rd Yoo MD  Patient Care Team:  Zenaida Suarez MD as PCP - General (Internal Medicine)  Pao Levi RPH as Pharmacist  Alexander Valiente PharmD as Pharmacist (Pharmacy)  Zenaida Suarez MD as Referring Physician (Internal Medicine)  Lucien Larkin MD as Consulting Physician (Hematology and Oncology)      Chief complaint: chills, generalized malaise     History of Present Illness: Ms. Longoria is an 89 year old woman followed by Dr. Regalado for persistent atrial fibrillation, ischemic cardiomyopathy, chronic systolic heart failure, chronic kidney disease, obstructive sleep apnea, coronary artery disease (MI in  [place] to diagonal with instent stenosis , repeat stenting in ). Also had occluded mid LAD and some disease of the RCA and circ), and symptomatic bradycardia status post dual chamber pacemaker. GDMT has been limited by hypotension. Most recent echo showed EF 36-40% in 2018. She has CLL.     She saw Sarah on  and was doing pretty well at that time. No changes were made. She is on warfarin for AF and INRs have been stable.     She came to the emergency room yesterday with chills and generalized malaise. She has been admitted for presumed viral syndrome . Respiratory viral panel negative. HS troponin 71 then 56. K 2.5. kidney function stable. Procal 4.86. INr therapeutic. WBC count 18K. proBNP 4294.     RV paced 99%. 100% AF burden - interrogation 2024.     She was doing really well prior to this. She doesn't have any cardiac complaints.     Echo 2018  Left ventricular systolic function is moderately decreased. Calculated EF = 49%. Estimated EF was in disagreement with the calculated EF. Estimated EF  appears to be in the range of 36 - 40%. Normal left ventricular wall thickness noted.  The following segments are akinetic: mid inferior, apical septal, apical inferior, apical lateral and apex. The following segments are hypokinetic: mid inferoseptal and mid inferolateral.  The left ventricular cavity is mildly dilated.  Left ventricular diastolic dysfunction is noted (grade II w/high LAP) consistent with pseudonormalization.  Electronic lead present in the ventricle.  Left atrial volume is moderately increased.  There is moderate calcification of the aortic valve.  Moderate MAC is present.  Mild-to-moderate mitral valve regurgitation is present.  Mild tricuspid valve regurgitation is present. Estimated right ventricular systolic pressure from tricuspid regurgitation is normal (<35 mmHg).         Past Medical History:   Diagnosis Date    Aortic calcification     mild, on echo 12/17/2017    Aortic regurgitation     Trace    Asthma     Atrial fibrillation     CAD (coronary artery disease)     Carpal tunnel syndrome of left wrist     Chronic combined systolic and diastolic congestive heart failure     CKD (chronic kidney disease) stage 3, GFR 30-59 ml/min     COPD (chronic obstructive pulmonary disease)     Coronary artery disease involving native coronary artery of native heart with angina pectoris     Disc degeneration, lumbar     Diverticulosis     DM type 2 (diabetes mellitus, type 2)     GERD (gastroesophageal reflux disease)     History of aneurysm     right femoral artery s/p LHC    History of blood transfusion     History of fracture     History of heart attack     History of vitamin D deficiency     Hyperlipidemia     Hypertension     Leukemia     Mild mitral regurgitation     Mitral annular calcification     12/8/2017- echo, moderate    Osteopenia     PAF (paroxysmal atrial fibrillation)     Peripheral neuropathy     Skin cancer     Left hand    Sleep apnea     bipap    SSS (sick sinus syndrome)     Stroke  (cerebrum)     TIA (transient ischemic attack) 2017    Tricuspid regurgitation     Trace       Past Surgical History:   Procedure Laterality Date    BRONCHOSCOPY Bilateral 10/6/2020    Procedure: BRONCHOSCOPY with BILATERAL LUNG washings;  Surgeon: Juno Coburn MD;  Location:  [facility];  Service: Pulmonary;  Laterality: Bilateral;  PRE: purulent bronchitis  POST: PURULENT BRONCHITIS    BRONCHOSCOPY Bilateral 10/9/2020    Procedure: BRONCHOSCOPY with washing;  Surgeon: Juno Coburn MD;  Location[facility];  Service: Pulmonary;  Laterality: Bilateral;  pre/post - mucous plug      CARDIAC CATHETERIZATION      CARDIAC ELECTROPHYSIOLOGY PROCEDURE N/A 2/7/2020    Procedure: PPM generator change - dual  medtronic;  Surgeon: Kiel Field MD;  Location: [facility] CATH INVASIVE LOC[facility];  Service: Cardiology;  Laterality: N/A;    CHOLECYSTECTOMY      CORONARY STENT PLACEMENT      ENDOSCOPY N/A 9/14/2022    Procedure: ESOPHAGOGASTRODUODENOSCOPY with 54fr main dilatation;  Surgeon: Enio Cota MD;  Location: [facility];  Service: Gastroenterology;  Laterality: N/A;  pre - dysphagia  post - s/p dilatation, watermelon stomach    HERNIA REPAIR      hital hernia    HYSTERECTOMY      PACEMAKER IMPLANTATION      REPLACEMENT TOTAL KNEE Bilateral          Prior to Admission medications    Medication Sig Start Date End Date Taking? Authorizing Provider   carvedilol (COREG) 3.125 MG tablet TAKE 1 TABLET TWICE A DAY 7/22/24  Yes Darling Rodney APRN   cephalexin (KEFLEX) 500 MG capsule Take 1 capsule by mouth 3 (Three) Times a Day.   Yes Provider, MD Shant   furosemide (LASIX) 20 MG tablet Take 1 tablet by mouth As Needed (For weight gain of greater than 2 pounds in 1 day or 5 pounds in 1 week).  Patient taking differently: Take 1 tablet by mouth 2 (Two) Times a Day. 7/19/22  Yes Jonah Regalado Jr., MD   glipizide (GLUCOTROL) 5 MG tablet Take 1 tablet by mouth. Take one half tablet by mouth  daily 7/12/21  Yes Shant Wyatt MD   metFORMIN ER (GLUCOPHAGE-XR) 500 MG 24 hr tablet Take 1 tablet by mouth Daily With Breakfast. 11/21/21  Yes Shant Wyatt MD   montelukast (SINGULAIR) 10 MG tablet Take 1 tablet by mouth Every Night.   Yes Shant Wyatt MD   oxybutynin XL (DITROPAN-XL) 10 MG 24 hr tablet Take 1 tablet by mouth 2 (Two) Times a Day.   Yes Shant Wyatt MD   rosuvastatin (CRESTOR) 20 MG tablet Take 1 tablet by mouth Every Night. 12/12/17  Yes Shant Wyatt MD   warfarin (COUMADIN) 4 MG tablet TAKE ONE-HALF (1/2) TABLET ON SUNDAY AND FRIDAY AND TAKE ONE TABLET ALL OTHER DAYS OR AS DIRECTED  Patient taking differently: Take  by mouth See Admin Instructions. TAKE ONE-HALF (1/2) TABLET ON SUNDAY AND FRIDAY AND TAKE ONE TABLET ALL OTHER DAYS OR AS DIRECTED 5/1/24  Yes Jonah Regalado Jr., MD   acetaminophen (TYLENOL) 325 MG tablet Take 2 tablets by mouth Every 4 (Four) Hours As Needed for Mild Pain . 10/14/20   Rd Yoo MD   albuterol (PROVENTIL) (2.5 MG/3ML) 0.083% nebulizer solution Take 2.5 mg by nebulization Every 4 (Four) Hours.    Shant Wyatt MD   albuterol sulfate  (90 Base) MCG/ACT inhaler Inhale 2 puffs Every 4 (Four) Hours As Needed for Wheezing. 9/20/19   Dania La MD   Benralizumab (FASENRA SC) Inject  under the skin into the appropriate area as directed. Gets one shot a month, next shot may 13 then one every 8 weeks    Shant Wyatt MD   budesonide (PULMICORT) 0.5 MG/2ML nebulizer solution Take 2 mL by nebulization 2 (Two) Times a Day.    Shant Wyatt MD   Calcium Carbonate-Vitamin D (calcium-vitamin D) 500-200 MG-UNIT tablet per tablet Take 1 tablet by mouth.  Patient not taking: Reported on 8/7/2024 8/24/21   Shant Wyatt MD   Cetirizine HCl 10 MG capsule Take  by mouth.    Shant Wyatt MD   fluticasone (FLONASE) 50 MCG/ACT nasal spray 1 spray into the nostril(s) as directed by  provider Daily.    ProviderShant MD   melatonin 5 MG tablet tablet Take 1 tablet by mouth Every Night.    ProviderShant MD   revefenacin (Yupelri) 175 MCG/3ML nebulizer solution Take  by nebulization Daily.    ProviderShant MD       Allergies   Allergen Reactions    Accupril [Quinapril Hcl] Swelling, Other (See Comments), GI Intolerance and Delirium     HA, constipation     Ahist [Chlorpheniramine] Nausea Only, Other (See Comments) and Dizziness     Headache, Blurred vision    Clarithromycin Nausea Only, Other (See Comments) and Mental Status Change     HA, Depression, Flushing    Esomeprazole GI Intolerance    Latex Other (See Comments)     Skin breakdown    Levalbuterol Swelling    Levocetirizine Diarrhea and GI Intolerance    Lipitor [Atorvastatin] Other (See Comments) and Myalgia     Dark urine    Omeprazole Nausea Only and Other (See Comments)     HA    Pravachol [Pravastatin] Nausea Only and GI Intolerance     Bloated, Constipation, HA    Sulindac Other (See Comments) and Myalgia     HA, joint pain, bruising    Valdecoxib Irritability    Chlorcyclizine Unknown - Low Severity    Diclofenac Sodium Unknown - Low Severity    Diphenhydramine Unknown - Low Severity    Lodine [Etodolac] Unknown - Low Severity    Sulfa Antibiotics Unknown - Low Severity       Social History     Socioeconomic History    Marital status: Single   Tobacco Use    Smoking status: Never     Passive exposure: Never    Smokeless tobacco: Never    Tobacco comments:     caffeine use- soda   Vaping Use    Vaping status: Never Used   Substance and Sexual Activity    Alcohol use: Never    Drug use: No    Sexual activity: Defer       Family History   Problem Relation Age of Onset    Heart disease Mother     Hypertension Mother     Colon polyps Mother     Heart disease Father     Hypertension Father     Stroke Father     Hypertension Brother     Heart disease Brother     Diabetes Niece     Colon cancer Sister     Hypertension  Sister     Hypertension Daughter     Hypertension Son     Hypertension Maternal Aunt     Hypertension Maternal Grandmother     Hypertension Maternal Grandfather     Hypertension Paternal Grandmother     Hypertension Paternal Grandfather        REVIEW OF SYSTEMS:   All systems reviewed.  Pertinent positives identified in HPI.  All other systems are negative.      Objective:     Vitals:    08/23/24 0703 08/23/24 0704 08/23/24 0709 08/23/24 0714   BP:    108/61   BP Location:    Left arm   Patient Position:    Lying   Pulse: 80  80 81   Resp: 20 20 20 18   Temp:    99.1 °F (37.3 °C)   TempSrc:    Oral   SpO2: 94%  100% 94%   Weight:       Height:         Body mass index is 32.53 kg/m².    Physical Exam:  Constitutional: She is oriented to person, place, and time. She appears well-developed. She does not appear ill.   HENT:   Head: Normocephalic and atraumatic. Head is without contusion.   Right Ear: Hearing normal. No drainage.   Left Ear: Hearing normal. No drainage.   Nose: No nasal deformity. No epistaxis.   Eyes: Lids are normal. Right eye exhibits no exudate. Left eye exhibits no exudate.  Neck: No JVD present. Carotid bruit is not present. No tracheal deviation present. No thyroid mass and no thyromegaly present.   Cardiovascular: Normal rate, regular rhythm and 2/6 murmur.    Pulses:       Posterior tibial pulses are 2+ on the right side, and 2+ on the left side.   Pulmonary/Chest: Effort normal and breath sounds normal.   Abdominal: Soft. Normal appearance and bowel sounds are normal. There is no tenderness.   Musculoskeletal: Normal range of motion.        Right shoulder: She exhibits no deformity.        Left shoulder: She exhibits no deformity.   Neurological: She is alert and oriented to person, place, and time. She has normal strength.   Skin: Skin is warm, dry and intact. No rash noted.   Psychiatric: She has a normal mood and affect. Her behavior is normal. Thought content normal.   Vitals  reviewed      Lab Review:     Results from last 7 days   Lab Units 08/23/24  0614   SODIUM mmol/L 140   POTASSIUM mmol/L 3.8   CHLORIDE mmol/L 103   CO2 mmol/L 27.0   BUN mg/dL 18   CREATININE mg/dL 0.79   CALCIUM mg/dL 8.8   BILIRUBIN mg/dL 1.4*   ALK PHOS U/L 91   ALT (SGPT) U/L 15   AST (SGOT) U/L 22   GLUCOSE mg/dL 108*     Results from last 7 days   Lab Units 08/22/24  1419 08/22/24  1146   HSTROP T ng/L 56* 71*     Results from last 7 days   Lab Units 08/23/24  0614   WBC 10*3/mm3 16.14*   HEMOGLOBIN g/dL 12.1   HEMATOCRIT % 38.0   PLATELETS 10*3/mm3 114*     Results from last 7 days   Lab Units 08/23/24  0614 08/22/24  1146   INR  2.02* 2.68*     Results from last 7 days   Lab Units 08/23/24  0614   MAGNESIUM mg/dL 2.4                 Current Facility-Administered Medications:     acetaminophen (TYLENOL) tablet 650 mg, 650 mg, Oral, Q4H PRN **OR** acetaminophen (TYLENOL) 160 MG/5ML oral solution 650 mg, 650 mg, Oral, Q4H PRN **OR** acetaminophen (TYLENOL) suppository 650 mg, 650 mg, Rectal, Q4H PRN, Rd Yoo MD    albuterol (ACCUNEB) nebulizer solution 0.63 mg, 0.63 mg, Nebulization, Q6H PRN, Rd Yoo MD    sennosides-docusate (PERICOLACE) 8.6-50 MG per tablet 2 tablet, 2 tablet, Oral, BID PRN **AND** polyethylene glycol (MIRALAX) packet 17 g, 17 g, Oral, Daily PRN **AND** bisacodyl (DULCOLAX) EC tablet 5 mg, 5 mg, Oral, Daily PRN **AND** bisacodyl (DULCOLAX) suppository 10 mg, 10 mg, Rectal, Daily PRN, Rd Yoo MD    budesonide (PULMICORT) nebulizer solution 0.5 mg, 0.5 mg, Nebulization, BID, Rd Yoo MD, 0.5 mg at 08/23/24 0704    calcium 500 mg vitamin D 5 mcg (200 UT) per tablet 1 tablet, 1 tablet, Oral, BID, Rd Yoo MD, 1 tablet at 08/22/24 2044    Calcium Replacement - Follow Nurse / BPA Driven Protocol, , Does not apply, PRN, Rd Yoo MD    carvedilol (COREG) tablet 3.125 mg, 3.125 mg, Oral, BID, Rd Yoo MD, 3.125 mg  at 08/23/24 0005    dextrose (D50W) (25 g/50 mL) IV injection 25 g, 25 g, Intravenous, Q15 Min PRN, Rd Yoo MD    dextrose (GLUTOSE) oral gel 15 g, 15 g, Oral, Q15 Min PRN, Rd Yoo MD    fluticasone (FLONASE) 50 MCG/ACT nasal spray 1 spray, 1 spray, Nasal, Daily, Rd Yoo MD    glucagon (GLUCAGEN) injection 1 mg, 1 mg, Intramuscular, Q15 Min PRN, Rd Yoo MD    Glycerin-Hypromellose- (ARTIFICIAL TEARS) 0.2-0.2-1 % ophthalmic solution solution 1 drop, 1 drop, Both Eyes, Q2H PRN, Rd Yoo MD    insulin lispro (HUMALOG/ADMELOG) injection 2-9 Units, 2-9 Units, Subcutaneous, 4x Daily AC & at Bedtime, Rd Yoo MD    ipratropium-albuterol (DUO-NEB) nebulizer solution 3 mL, 3 mL, Nebulization, 4x Daily - RT, Rd Yoo MD, 3 mL at 08/23/24 0703    Magnesium Standard Dose Replacement - Follow Nurse / BPA Driven Protocol, , Does not apply, PRN, Rd Yoo MD    melatonin tablet 5 mg, 5 mg, Oral, Nightly, Rd Yoo MD, 5 mg at 08/22/24 2044    montelukast (SINGULAIR) tablet 10 mg, 10 mg, Oral, Nightly, Rd Yoo MD, 10 mg at 08/22/24 2044    nitroglycerin (NITROSTAT) SL tablet 0.4 mg, 0.4 mg, Sublingual, Q5 Min PRN, Rd Yoo MD    ondansetron ODT (ZOFRAN-ODT) disintegrating tablet 4 mg, 4 mg, Oral, Q6H PRN **OR** ondansetron (ZOFRAN) injection 4 mg, 4 mg, Intravenous, Q6H PRN, Rd Yoo MD    oxybutynin XL (DITROPAN-XL) 24 hr tablet 5 mg, 5 mg, Oral, BID, Rd Yoo MD, 5 mg at 08/22/24 2044    Pharmacy to dose warfarin, , Does not apply, Continuous PRN, Rd Yoo MD    Phosphorus Replacement - Follow Nurse / BPA Driven Protocol, , Does not apply, PRN, Rd Yoo MD    Potassium Replacement - Follow Nurse / BPA Driven Protocol, , Does not apply, PRN, Rd Yoo MD    rosuvastatin (CRESTOR) tablet 20 mg, 20 mg, Oral, Nightly, Rd Yoo  MD YASIR, 20 mg at 08/22/24 2044    sodium chloride 0.9 % flush 10 mL, 10 mL, Intravenous, PRN, Rd Yoo MD    sodium chloride 0.9 % flush 10 mL, 10 mL, Intravenous, Q12H, Rd Yoo MD, 10 mL at 08/22/24 2045    sodium chloride 0.9 % flush 10 mL, 10 mL, Intravenous, PRN, Rd Yoo MD    sodium chloride 0.9 % infusion 40 mL, 40 mL, Intravenous, PRN, Rd Yoo MD    warfarin (COUMADIN) tablet 2 mg, 2 mg, Oral, Once per day on Sunday Friday, Rd Yoo MD    warfarin (COUMADIN) tablet 4 mg, 4 mg, Oral, Once per day on Monday Tuesday Wednesday Thursday Saturday, Rd Yoo MD, 4 mg at 08/22/24 2044    Assessment and Plan:       Chills, malaise - suspected viral type syndrome though RVP negative. Procal and WBC count elevated.   Chronic heart failure with reduced left ventricular systolic function. Status post one dose of IV lasix. She appears relatively compensated currently. Echocardiogram pending.   Coronary artery disease -  status post MI in 2008 [place] to diagonal then with instent stenosis , repeat stenting in 2012. Also had occluded mid LAD and some disease of the RCA and circ. She denies angina.   Elevated high sensitivity troponin, likely chronic myocardial injury due to cardiomyopathy and heart failure.   Hypokalemia   Persistent atrial fibrillation, on warfarin. INR therapeutic   Sick sinus syndrome status post permanent pacemaker. 100% paced.   History of CVA   Diabetes mellitus type II   Left hemidiaphragm elevated, chronic and unchanged from prior    Leeanna Mane, KRISTIN  08/23/24  08:32 EDT

## 2024-08-23 NOTE — PLAN OF CARE
Goal Outcome Evaluation:      Pt continues to improve, PT to see per order, no complications, will continue to monitor

## 2024-08-23 NOTE — CASE MANAGEMENT/SOCIAL WORK
Discharge Planning Assessment  [facility]     Patient Name: Caitlyn Longoria  MRN: [id number]  Today's Date: 8/23/2024    Admit Date: 8/22/2024    Plan: Plan home.  KENNETH Recinos RN   Discharge Needs Assessment       Row Name 08/23/24 0905       Living Environment    People in Home facility resident    Current Living Arrangements independent living facility    Potentially Unsafe Housing Conditions none    In the past 12 months has the electric, gas, oil, or water company threatened to shut off services in your home? No    Primary Care Provided by self    Provides Primary Care For no one    Family Caregiver if Needed other relative(s)    Family Caregiver Names Niece  Celeste Adair ([phone])    Quality of Family Relationships supportive;helpful    Able to Return to Prior Arrangements yes    Living Arrangement Comments Pt lives at [facility] in a third floor apartment with elevator access.       Resource/Environmental Concerns    Resource/Environmental Concerns none    Transportation Concerns none       Transportation Needs    In the past 12 months, has lack of transportation kept you from medical appointments or from getting medications? no    In the past 12 months, has lack of transportation kept you from meetings, work, or from getting things needed for daily living? No       Food Insecurity    Within the past 12 months, you worried that your food would run out before you got the money to buy more. Never true    Within the past 12 months, the food you bought just didn't last and you didn't have money to get more. Never true       Transition Planning    Patient/Family Anticipates Transition to home    Patient/Family Anticipated Services at Transition none    Transportation Anticipated family or friend will provide       Discharge Needs Assessment    Readmission Within the Last 30 Days no previous admission in last 30 days    Equipment Currently Used at Home bipap;grab  bar;nebulizer;oxygen;rollator;shower chair;walker, rolling    Concerns to be Addressed no discharge needs identified;denies needs/concerns at this time    Anticipated Changes Related to Illness none    Equipment Needed After Discharge bipap;grab bar, tub/shower;nebulizer;oxygen;rollator;shower chair;walker, rolling                   Discharge Plan       Row Name 08/23/24 0909       Plan    Plan Plan home.  KENNETH Recinos RN    Patient/Family in Agreement with Plan yes    Plan Comments FACE SHEET VERIFIED/ IM LETTER SIGNED. Spoke with pt at bedside. Pt's PCP is Dr. Zenaida Suarez.  Pt lives at [facility] in a third floor apartment with elevator access.  Pt is independent with ADLs. Pt has a Bi PAP and home O2 provided by [facility].  Pt also has a grab bar, nebulizer, rollator, shower chair, and rolling walker for home use.  Pt gets her prescriptions at [facility]  ([address]  Pt denies any issues affording medications. Pt is not current with .  Pt has been in [facility] and [facility] for skilled care. Pt denies any discharge needs at present. Pt states her friends and niece ( Zoey Adair ([phone]) will assist her at home and transport her home. Plan home.  KENNETH Recinos RN    Final Discharge Disposition Code --                  Continued Care and Services - Admitted Since 8/22/2024    No active coordination exists for this encounter.       Expected Discharge Date and Time       Expected Discharge Date Expected Discharge Time    Aug 26, 2024            Demographic Summary       Row Name 08/23/24 0904       General Information    Admission Type inpatient    Arrived From emergency department    Required Notices Provided Important Message from Medicare    Referral Source admission list    Reason for Consult discharge planning    Preferred Language English                   Functional Status       Row Name 08/23/24 0905       Functional Status    Usual Activity Tolerance good     Current Activity Tolerance moderate       Functional Status, IADL    Medications independent    Meal Preparation assistive person    Housekeeping assistive person    Laundry assistive person    Shopping assistive person       Mental Status    General Appearance WDL WDL                   Psychosocial    No documentation.                  Abuse/Neglect    No documentation.                  Legal    No documentation.                  Substance Abuse    No documentation.                  Patient Forms    No documentation.                     Narda Recinos RN

## 2024-08-23 NOTE — PROGRESS NOTES
[facility] Clinical Pharmacy Services: Clinical Pharmacy Consult - Warfarin Dosing/Monitoring    Results from last 7 days   Lab Units 08/23/24  0614 08/22/24  1146   INR  2.02* 2.68*     Dose Ordered: warfarin 2 mg Fri & Sun; 4 mg all other days   Comments: INR is on the low end of goal. INR will tend to increase with acute illness so will continue home dose for now.     Pharmacy will continue to follow until discharge or discontinuation of warfarin.    Yousuf Pulliam [facility]  Clinical Pharmacist

## 2024-08-24 ENCOUNTER — APPOINTMENT (OUTPATIENT)
Dept: CARDIOLOGY | Facility: HOSPITAL | Age: 89
DRG: 152 | End: 2024-08-24
Payer: MEDICARE

## 2024-08-24 LAB
ALBUMIN SERPL-MCNC: 3.2 G/DL (ref 3.5–5.2)
ALBUMIN/GLOB SERPL: 1.4 G/DL
ALP SERPL-CCNC: 90 U/L (ref 39–117)
ALT SERPL W P-5'-P-CCNC: 15 U/L (ref 1–33)
ANION GAP SERPL CALCULATED.3IONS-SCNC: 9.1 MMOL/L (ref 5–15)
AORTIC DIMENSIONLESS INDEX: 0.5 (DI)
AST SERPL-CCNC: 22 U/L (ref 1–32)
BH CV ECHO MEAS - AO MAX PG: 16.1 MMHG
BH CV ECHO MEAS - AO MEAN PG: 8.4 MMHG
BH CV ECHO MEAS - AO V2 MAX: 200.8 CM/SEC
BH CV ECHO MEAS - AO V2 VTI: 34.7 CM
BH CV ECHO MEAS - AVA(I,D): 1.29 CM2
BH CV ECHO MEAS - EDV(CUBED): 44.3 ML
BH CV ECHO MEAS - EDV(MOD-SP2): 89 ML
BH CV ECHO MEAS - EDV(MOD-SP4): 133 ML
BH CV ECHO MEAS - EF(MOD-BP): 48.9 %
BH CV ECHO MEAS - EF(MOD-SP2): 51.7 %
BH CV ECHO MEAS - EF(MOD-SP4): 44.4 %
BH CV ECHO MEAS - ESV(CUBED): 16.9 ML
BH CV ECHO MEAS - ESV(MOD-SP2): 43 ML
BH CV ECHO MEAS - ESV(MOD-SP4): 74 ML
BH CV ECHO MEAS - FS: 27.5 %
BH CV ECHO MEAS - IVS/LVPW: 1.15 CM
BH CV ECHO MEAS - IVSD: 1.02 CM
BH CV ECHO MEAS - LAT PEAK E' VEL: 7.3 CM/SEC
BH CV ECHO MEAS - LV DIASTOLIC VOL/BSA (35-75): 71.1 CM2
BH CV ECHO MEAS - LV MASS(C)D: 98.7 GRAMS
BH CV ECHO MEAS - LV MAX PG: 3.4 MMHG
BH CV ECHO MEAS - LV MEAN PG: 1.63 MMHG
BH CV ECHO MEAS - LV SYSTOLIC VOL/BSA (12-30): 39.6 CM2
BH CV ECHO MEAS - LV V1 MAX: 91.7 CM/SEC
BH CV ECHO MEAS - LV V1 VTI: 16.6 CM
BH CV ECHO MEAS - LVIDD: 3.5 CM
BH CV ECHO MEAS - LVIDS: 2.6 CM
BH CV ECHO MEAS - LVOT AREA: 2.7 CM2
BH CV ECHO MEAS - LVOT DIAM: 1.85 CM
BH CV ECHO MEAS - LVPWD: 0.89 CM
BH CV ECHO MEAS - MED PEAK E' VEL: 6.9 CM/SEC
BH CV ECHO MEAS - MV DEC SLOPE: 382.3 CM/SEC2
BH CV ECHO MEAS - MV DEC TIME: 0.24 SEC
BH CV ECHO MEAS - MV E MAX VEL: 94.3 CM/SEC
BH CV ECHO MEAS - MV MAX PG: 3.8 MMHG
BH CV ECHO MEAS - MV MEAN PG: 1.29 MMHG
BH CV ECHO MEAS - MV P1/2T: 77.9 MSEC
BH CV ECHO MEAS - MV V2 VTI: 20.5 CM
BH CV ECHO MEAS - MVA(P1/2T): 2.8 CM2
BH CV ECHO MEAS - MVA(VTI): 2.18 CM2
BH CV ECHO MEAS - PA ACC TIME: 0.06 SEC
BH CV ECHO MEAS - PA V2 MAX: 100.8 CM/SEC
BH CV ECHO MEAS - PULM A REVS DUR: 0.11 SEC
BH CV ECHO MEAS - PULM A REVS VEL: 28.4 CM/SEC
BH CV ECHO MEAS - PULM DIAS VEL: 34.6 CM/SEC
BH CV ECHO MEAS - PULM S/D: 1.4
BH CV ECHO MEAS - PULM SYS VEL: 48.4 CM/SEC
BH CV ECHO MEAS - QP/QS: 2.36
BH CV ECHO MEAS - RAP SYSTOLE: 3 MMHG
BH CV ECHO MEAS - RV MAX PG: 2.8 MMHG
BH CV ECHO MEAS - RV V1 MAX: 84.2 CM/SEC
BH CV ECHO MEAS - RV V1 VTI: 13.8 CM
BH CV ECHO MEAS - RVOT DIAM: 3.1 CM
BH CV ECHO MEAS - RVSP: 20 MMHG
BH CV ECHO MEAS - SV(LVOT): 44.7 ML
BH CV ECHO MEAS - SV(MOD-SP2): 46 ML
BH CV ECHO MEAS - SV(MOD-SP4): 59 ML
BH CV ECHO MEAS - SV(RVOT): 105.6 ML
BH CV ECHO MEAS - SVI(LVOT): 23.9 ML/M2
BH CV ECHO MEAS - SVI(MOD-SP2): 24.6 ML/M2
BH CV ECHO MEAS - SVI(MOD-SP4): 31.5 ML/M2
BH CV ECHO MEAS - TR MAX PG: 16.6 MMHG
BH CV ECHO MEAS - TR MAX VEL: 203.9 CM/SEC
BH CV ECHO MEASUREMENTS AVERAGE E/E' RATIO: 13.28
BH CV XLRA - TDI S': 8.1 CM/SEC
BILIRUB SERPL-MCNC: 1.4 MG/DL (ref 0–1.2)
BUN SERPL-MCNC: 19 MG/DL (ref 8–23)
BUN/CREAT SERPL: 24.7 (ref 7–25)
CALCIUM SPEC-SCNC: 8.5 MG/DL (ref 8.6–10.5)
CHLORIDE SERPL-SCNC: 101 MMOL/L (ref 98–107)
CO2 SERPL-SCNC: 26.9 MMOL/L (ref 22–29)
CREAT SERPL-MCNC: 0.77 MG/DL (ref 0.57–1)
DEPRECATED RDW RBC AUTO: 47.3 FL (ref 37–54)
EGFRCR SERPLBLD CKD-EPI 2021: 73.8 ML/MIN/1.73
ERYTHROCYTE [DISTWIDTH] IN BLOOD BY AUTOMATED COUNT: 13 % (ref 12.3–15.4)
GLOBULIN UR ELPH-MCNC: 2.3 GM/DL
GLUCOSE BLDC GLUCOMTR-MCNC: 183 MG/DL (ref 70–130)
GLUCOSE BLDC GLUCOMTR-MCNC: 188 MG/DL (ref 70–130)
GLUCOSE BLDC GLUCOMTR-MCNC: 96 MG/DL (ref 70–130)
GLUCOSE SERPL-MCNC: 97 MG/DL (ref 65–99)
HCT VFR BLD AUTO: 35.3 % (ref 34–46.6)
HGB BLD-MCNC: 11.2 G/DL (ref 12–15.9)
INR PPP: 1.94 (ref 0.9–1.1)
LEFT ATRIUM VOLUME INDEX: 40.8 ML/M2
LYMPHOCYTES # BLD MANUAL: 13.3 10*3/MM3 (ref 0.7–3.1)
MCH RBC QN AUTO: 31.7 PG (ref 26.6–33)
MCHC RBC AUTO-ENTMCNC: 31.7 G/DL (ref 31.5–35.7)
MCV RBC AUTO: 100 FL (ref 79–97)
NEUTROPHILS # BLD AUTO: 3.53 10*3/MM3 (ref 1.7–7)
NEUTROPHILS NFR BLD MANUAL: 21 % (ref 42.7–76)
PLAT MORPH BLD: NORMAL
PLATELET # BLD AUTO: 110 10*3/MM3 (ref 140–450)
PMV BLD AUTO: 10.1 FL (ref 6–12)
POTASSIUM SERPL-SCNC: 3.6 MMOL/L (ref 3.5–5.2)
POTASSIUM SERPL-SCNC: 4.5 MMOL/L (ref 3.5–5.2)
PROT SERPL-MCNC: 5.5 G/DL (ref 6–8.5)
PROTHROMBIN TIME: 22.4 SECONDS (ref 11.7–14.2)
RBC # BLD AUTO: 3.53 10*6/MM3 (ref 3.77–5.28)
RBC MORPH BLD: NORMAL
SINUS: 3.2 CM
SMUDGE CELLS BLD QL SMEAR: ABNORMAL
SODIUM SERPL-SCNC: 137 MMOL/L (ref 136–145)
STJ: 2.7 CM
VARIANT LYMPHS NFR BLD MANUAL: 79 % (ref 19.6–45.3)
WBC NRBC COR # BLD AUTO: 16.83 10*3/MM3 (ref 3.4–10.8)

## 2024-08-24 PROCEDURE — 82948 REAGENT STRIP/BLOOD GLUCOSE: CPT

## 2024-08-24 PROCEDURE — 80053 COMPREHEN METABOLIC PANEL: CPT | Performed by: HOSPITALIST

## 2024-08-24 PROCEDURE — 97110 THERAPEUTIC EXERCISES: CPT

## 2024-08-24 PROCEDURE — 84132 ASSAY OF SERUM POTASSIUM: CPT | Performed by: INTERNAL MEDICINE

## 2024-08-24 PROCEDURE — 94799 UNLISTED PULMONARY SVC/PX: CPT

## 2024-08-24 PROCEDURE — 93306 TTE W/DOPPLER COMPLETE: CPT | Performed by: INTERNAL MEDICINE

## 2024-08-24 PROCEDURE — 63710000001 INSULIN LISPRO (HUMAN) PER 5 UNITS: Performed by: HOSPITALIST

## 2024-08-24 PROCEDURE — 25510000001 PERFLUTREN 6.52 MG/ML SUSPENSION 2 ML VIAL: Performed by: NURSE PRACTITIONER

## 2024-08-24 PROCEDURE — 97162 PT EVAL MOD COMPLEX 30 MIN: CPT

## 2024-08-24 PROCEDURE — 85025 COMPLETE CBC W/AUTO DIFF WBC: CPT | Performed by: HOSPITALIST

## 2024-08-24 PROCEDURE — 85007 BL SMEAR W/DIFF WBC COUNT: CPT | Performed by: HOSPITALIST

## 2024-08-24 PROCEDURE — 93306 TTE W/DOPPLER COMPLETE: CPT

## 2024-08-24 PROCEDURE — 94761 N-INVAS EAR/PLS OXIMETRY MLT: CPT

## 2024-08-24 PROCEDURE — 99232 SBSQ HOSP IP/OBS MODERATE 35: CPT | Performed by: INTERNAL MEDICINE

## 2024-08-24 PROCEDURE — 85610 PROTHROMBIN TIME: CPT | Performed by: HOSPITALIST

## 2024-08-24 PROCEDURE — 94664 DEMO&/EVAL PT USE INHALER: CPT

## 2024-08-24 RX ORDER — AZITHROMYCIN 250 MG/1
500 TABLET, FILM COATED ORAL
Status: DISCONTINUED | OUTPATIENT
Start: 2024-08-24 | End: 2024-08-25 | Stop reason: HOSPADM

## 2024-08-24 RX ORDER — POTASSIUM CHLORIDE 750 MG/1
40 TABLET, FILM COATED, EXTENDED RELEASE ORAL EVERY 4 HOURS
Status: DISPENSED | OUTPATIENT
Start: 2024-08-24 | End: 2024-08-24

## 2024-08-24 RX ADMIN — CALCIUM CARBONATE-VITAMIN D TAB 500 MG-200 UNIT 1 TABLET: 500-200 TAB at 21:09

## 2024-08-24 RX ADMIN — OXYBUTYNIN CHLORIDE 5 MG: 5 TABLET, EXTENDED RELEASE ORAL at 09:08

## 2024-08-24 RX ADMIN — INSULIN LISPRO 2 UNITS: 100 INJECTION, SOLUTION INTRAVENOUS; SUBCUTANEOUS at 18:13

## 2024-08-24 RX ADMIN — Medication 10 ML: at 21:09

## 2024-08-24 RX ADMIN — MONTELUKAST SODIUM 10 MG: 10 TABLET, FILM COATED ORAL at 21:09

## 2024-08-24 RX ADMIN — INSULIN LISPRO 2 UNITS: 100 INJECTION, SOLUTION INTRAVENOUS; SUBCUTANEOUS at 21:09

## 2024-08-24 RX ADMIN — PERFLUTREN 2 ML: 6.52 INJECTION, SUSPENSION INTRAVENOUS at 11:04

## 2024-08-24 RX ADMIN — OXYBUTYNIN CHLORIDE 5 MG: 5 TABLET, EXTENDED RELEASE ORAL at 21:10

## 2024-08-24 RX ADMIN — POTASSIUM CHLORIDE 40 MEQ: 750 TABLET, EXTENDED RELEASE ORAL at 18:13

## 2024-08-24 RX ADMIN — IPRATROPIUM BROMIDE AND ALBUTEROL SULFATE 3 ML: .5; 3 SOLUTION RESPIRATORY (INHALATION) at 15:49

## 2024-08-24 RX ADMIN — ROSUVASTATIN CALCIUM 20 MG: 20 TABLET, FILM COATED ORAL at 21:10

## 2024-08-24 RX ADMIN — CARVEDILOL 3.12 MG: 3.12 TABLET, FILM COATED ORAL at 09:08

## 2024-08-24 RX ADMIN — IPRATROPIUM BROMIDE AND ALBUTEROL SULFATE 3 ML: .5; 3 SOLUTION RESPIRATORY (INHALATION) at 06:56

## 2024-08-24 RX ADMIN — IPRATROPIUM BROMIDE AND ALBUTEROL SULFATE 3 ML: .5; 3 SOLUTION RESPIRATORY (INHALATION) at 21:16

## 2024-08-24 RX ADMIN — CARVEDILOL 3.12 MG: 3.12 TABLET, FILM COATED ORAL at 21:09

## 2024-08-24 RX ADMIN — BUDESONIDE 0.5 MG: 0.5 INHALANT RESPIRATORY (INHALATION) at 21:16

## 2024-08-24 RX ADMIN — CALCIUM CARBONATE-VITAMIN D TAB 500 MG-200 UNIT 1 TABLET: 500-200 TAB at 09:08

## 2024-08-24 RX ADMIN — AZITHROMYCIN 500 MG: 250 TABLET, FILM COATED ORAL at 18:13

## 2024-08-24 RX ADMIN — Medication 5 MG: at 21:10

## 2024-08-24 RX ADMIN — WARFARIN 4 MG: 4 TABLET ORAL at 18:13

## 2024-08-24 RX ADMIN — BUDESONIDE 0.5 MG: 0.5 INHALANT RESPIRATORY (INHALATION) at 06:57

## 2024-08-24 NOTE — PROGRESS NOTES
[facility] Clinical Pharmacy Services: Warfarin Dosing/Monitoring Consult    Caitlyn Longoria is a 89 y.o. female, estimated creatinine clearance is 50.8 mL/min (by C-G formula based on SCr of 0.77 mg/dL). weighing 83.9 kg (185 lb).    Results from last 7 days   Lab Units 08/24/24  0537 08/23/24  0614 08/22/24  1146 08/19/24  1323   INR  1.94* 2.02* 2.68*  --    HEMOGLOBIN g/dL 11.2* 12.1 11.8* 13.1   HEMATOCRIT % 35.3 38.0 35.8 42.4   PLATELETS 10*3/mm3 110* 114* 100* 131*     Prior to admission anticoagulation: warfarin 2 mg Fri & Sun; 4 mg all other days    Hospital Anticoagulation:  Consulting provider: Naila  Start date: [facility] [place]  Indication: A Fib - requiring full anticoagulation  Target INR: 2 - 3  Expected duration: indefinite   Bridge Therapy: No      Potential food or drug interactions:  Rosuvastatin (home med)- may enhance the anticoagulant effect of Vitamin K Antagonists    Education complete?/Date: N/A; home medication    Assessment/Plan:  Dose: INR 1.94. Very slightly below goal today. Will continue same home regimen for now.   RN to monitor for any signs or symptoms of bleeding  Follow up daily INRs and dose adjustments    Pharmacy will continue to follow until discharge or discontinuation of warfarin.     Molly Oliveira [facility]  Clinical Pharmacist

## 2024-08-24 NOTE — PROGRESS NOTES
LOS: 2 days   Patient Care Team:  Zenaida Suarez MD as PCP - General (Internal Medicine)  Pao Levi RPH as Pharmacist  Alexander Valiente PharmD as Pharmacist (Pharmacy)  Zenaida Suarez MD as Referring Physician (Internal Medicine)  Lucien Larkin MD as Consulting Physician (Hematology and Oncology)    Chief Complaint: Follow-up chronic combined CHF, coronary artery disease, persistent atrial fibrillation, pacemaker.    Interval History: Doing better in general.  Shortness of breath is stable.  Her chills have resolved.  No chest pain.    Vital Signs:  Temp:  [98 °F (36.7 °C)-98.5 °F (36.9 °C)] 98.2 °F (36.8 °C)  Heart Rate:  [80-81] 80  Resp:  [16-22] 16  BP: ()/(58-70) 112/64    Intake/Output Summary (Last 24 hours) at 8/24/2024 1906  Last data filed at 8/24/2024 0544  Gross per 24 hour   Intake --   Output 450 ml   Net -450 ml       Physical Exam:   General Appearance:    No acute distress, alert and oriented x4 appearing.   Lungs:     Clear to auscultation bilaterally     Heart:    Regular rhythm and normal rate (paced).  II/VI SM throughout.   Abdomen:     Soft, nontender, nondistended.    Extremities:   Ecchymosis and wound of the left lower extremity with bandaging.  Trace edema of the lower extremities.     Results Review:    Results from last 7 days   Lab Units 08/24/24  0537   SODIUM mmol/L 137   POTASSIUM mmol/L 3.6   CHLORIDE mmol/L 101   CO2 mmol/L 26.9   BUN mg/dL 19   CREATININE mg/dL 0.77   GLUCOSE mg/dL 97   CALCIUM mg/dL 8.5*     Results from last 7 days   Lab Units 08/22/24  1419 08/22/24  1146   HSTROP T ng/L 56* 71*     Results from last 7 days   Lab Units 08/24/24  0537   WBC 10*3/mm3 16.83*   HEMOGLOBIN g/dL 11.2*   HEMATOCRIT % 35.3   PLATELETS 10*3/mm3 110*     Results from last 7 days   Lab Units 08/24/24  0537 08/23/24  0614 08/22/24  1146   INR  1.94* 2.02* 2.68*         Results from last 7 days   Lab Units 08/23/24  0614   MAGNESIUM mg/dL 2.4            I reviewed the patient's new clinical results.        Assessment:  1.  Chills and generalized malaise  2.  Suspected upper respiratory viral infection (RVP negative)  3.  COPD without acute exacerbation  4.  Persistent atrial fibrillation, on warfarin  5.  Sick sinus syndrome, status post pacemaker with 100% pacing)  6.  Chronic combined CHF, compensated  7.  Cardiomyopathy, EF 40 to 45% with septal pacing wall motion abnormality by echo on 8/24/2024  8.  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  Known occluded mid LAD with collaterals.  9.  Type II NSTEMI secondary to respiratory viral infection and coronary artery disease  10.  Hypokalemia and hypomagnesemia, repleted  11.  CLL with leukocytosis  12.  Chronic thrombocytopenia  13.  History of CVA  14.  Type 2 diabetes    Plan:  -She is feeling better, and I suspect that all of her symptoms are going to be self-limited from the upper respiratory tract infection.    -Mild troponin elevation likely a type II NSTEMI as noted above.  She has had no anginal symptoms whatsoever.  Her LAD is occluded in the mid section, and she does have disease in her other vessels.  However, the only wall motion on the echocardiogram today was from septal pacing.  Her EF was 40 to 45%, which is similar to her prior echocardiograms.  There is no indication for an ischemic evaluation.  Additionally, with her thrombocytopenia, advanced age, and other comorbidities, she would be a poor candidate for any type of invasive ischemic evaluation in general (the risks would outweigh the benefits).    -Continue Coreg and Crestor.  She is not on antiplatelet therapy secondary to being on systemic anticoagulation with warfarin and elevated bleeding risk with her comorbidities.    -Continue warfarin.  She is 100% paced, which is normal for her.  She has underlying atrial fibrillation.  Goal INR is  2-3.    -She appears euvolemic.  Resume home Lasix at discharge.    -No further cardiac testing is needed.  No objection to discharge tomorrow from a cardiac standpoint.    Jason Khanna MD  08/24/24  19:06 EDT

## 2024-08-24 NOTE — PLAN OF CARE
Goal Outcome Evaluation:   Patient alert forgetful follows commands no c/o pain or nausea noted. No acute distress noted will continue to monitor

## 2024-08-24 NOTE — PROGRESS NOTES
Name: Caitlyn Longoria ADMIT: 2024   : 1934  PCP: Zenaida Suarez MD    MRN: [id number] LOS: 2 days   AGE/SEX: 89 y.o. female  ROOM: [facility]     Subjective   Subjective   No chest pain.  No palpitation.  No shortness of breath.  No wheezing.  No ankle edema.  No fever or chills.  Continues with dry cough.  Improved weakness and energy.    Review of Systems  GI.  No abdominal pain or nausea or vomiting  .  No dysuria or hematuria.     Objective   Objective   Vital Signs  Temp:  [98 °F (36.7 °C)-98.5 °F (36.9 °C)] 98.2 °F (36.8 °C)  Heart Rate:  [80-81] 80  Resp:  [16-22] 18  BP: ()/(58-70) 112/64  SpO2:  [88 %-100 %] 100 %  on  Flow (L/min):  [1.5] 1.5;   Device (Oxygen Therapy): nasal cannula    Intake/Output Summary (Last 24 hours) at 2024 1359  Last data filed at 2024 0544  Gross per 24 hour   Intake --   Output 1250 ml   Net -1250 ml     Body mass index is 32.77 kg/m².      24  0605 24  0500 24  1031   Weight: 83.3 kg (183 lb 10.3 oz) (weighed with 2 pillows 1 blanket 1 sheet) 84.3 kg (185 lb 14.4 oz) 83.9 kg (185 lb)     Physical Exam  General.  Elderly female.  She is alert and oriented x 4.  No apparent pain/distress/diaphoresis.  Normal mood and affect.  Eyes.  Pupils equal round and reactive.  Intact extraocular musculature.  No pallor or jaundice.  Oral cavity.  Moist mucous membrane.  Neck.  Supple.  No JVD.  No lymphadenopathy or thyromegaly.  Cardio vascular.  Regular rate and rhythm and grade 2 systolic murmur.  Chest.  Clear to auscultation bilaterally with no added sounds.  Abdomen.  Soft lax.  No tenderness.  No organomegaly.  No guarding or rebound.  Extremities.  No clubbing/cyanosis/edema.  Dressed wound of the left leg.  CNS.  No acute focal neurologic deficits.    Results Review:      Results from last 7 days   Lab Units 24  0537 24  0614 24  1419 24  1146   SODIUM mmol/L 137 140 140 139   POTASSIUM mmol/L  "3.6 3.8 2.5* 2.8*   CHLORIDE mmol/L 101 103 108* 104   CO2 mmol/L 26.9 27.0 21.0* 23.9   BUN mg/dL 19 18 17 17   CREATININE mg/dL 0.77 0.79 0.66 0.76   GLUCOSE mg/dL 97 108* 59* 128*   CALCIUM mg/dL 8.5* 8.8 7.7* 8.7   AST (SGOT) U/L 22 22 --  26   ALT (SGPT) U/L 15 15  --  20     Estimated Creatinine Clearance: 50.8 mL/min (by C-G formula based on SCr of 0.77 mg/dL).  Results from last 7 days   Lab Units 08/23/24  0614 08/19/24  1323   HEMOGLOBIN A1C % 6.80* 7.2*     Results from last 7 days   Lab Units 08/24/24  0619 08/23/24  1944 08/23/24  1531 08/23/24  1106 08/23/24  0616 08/22/24  2113 08/22/24  1740 08/22/24  1700   GLUCOSE mg/dL 96 181* 169* 212* 109 204* 221* 66*     Results from last 7 days   Lab Units 08/22/24  1419 08/22/24  1146   HSTROP T ng/L 56* 71*     Results from last 7 days   Lab Units 08/22/24  1146   PROBNP pg/mL 4,294.0*     Results from last 7 days   Lab Units 08/23/24  0614   TSH uIU/mL 1.290     Results from last 7 days   Lab Units 08/23/24  0614 08/22/24  1419 08/22/24  1146   MAGNESIUM mg/dL 2.4  --  1.5*   PHOSPHORUS mg/dL 2.3*   < >  --     < > = values in this interval not displayed.           Invalid input(s): \"LDLCALC\"  Results from last 7 days   Lab Units 08/24/24  0537 08/23/24  0614 08/22/24  1146 08/22/24  1146 08/19/24  1323   WBC 10*3/mm3 16.83* 16.14*  --  18.46* 18.73*   HEMOGLOBIN g/dL 11.2* 12.1  --  11.8* 13.1   HEMATOCRIT % 35.3 38.0  --  35.8 42.4   PLATELETS 10*3/mm3 110* 114*  --  100* 131*   MCV fL 100.0* 98.4*   < > 97.8* 101.7*   MCH pg 31.7 31.3   < > 32.2 31.4   MCHC g/dL 31.7 31.8   < > 33.0 30.9*   RDW % 13.0 13.1   < > 12.9 14.7   RDW-SD fl 47.3 47.8   < > 45.4  --    MPV fL 10.1 10.1   < > 9.6 10.4   NEUTROPHIL % %  --   --   --   --  17.6*   LYMPHOCYTE % %  --   --   --   --  80.4*   MONOCYTES % %  --   --   --   --  1.0   EOSINOPHIL % %  --   --   --   --  0.6   BASOPHIL % %  --   --   --   --  0.2   IMM GRAN % %  --   --   --   --  0.2   NEUTROS ABS " 10*3/mm3 3.53 8.07*   < > 8.47* 3.30   LYMPHS ABS 10*3/uL  --   --   --   --  15.06*   MONOS ABS 10*3/uL  --   --   --   --  0.19   EOS ABS 10*3/mm3  --   --   --  0.00 0.11   BASOS ABS 10*3/mm3  --   --   --  0.00 0.03   IMMATURE GRANS (ABS) 10*3/uL  --   --   --   --  0.04    < > = values in this interval not displayed.     Results from last 7 days   Lab Units 08/24/24  0537 08/23/24  0614 08/22/24  1146   INR  1.94* 2.02* 2.68*         Results from last 7 days   Lab Units 08/22/24  1146   PROCALCITONIN ng/mL 4.86*                 Results from last 7 days   Lab Units 08/22/24  1146   ADENOVIRUS DETECTION BY PCR  Not Detected   CORONAVIRUS 229E  Not Detected   CORONAVIRUS HKU1  Not Detected   CORONAVIRUS NL63  Not Detected   CORONAVIRUS OC43  Not Detected   HUMAN METAPNEUMOVIRUS  Not Detected   HUMAN RHINOVIRUS/ENTEROVIRUS  Not Detected   INFLUENZA B PCR  Not Detected   PARAINFLUENZA 1  Not Detected   PARAINFLUENZA VIRUS 2  Not Detected   PARAINFLUENZA VIRUS 3  Not Detected   PARAINFLUENZA VIRUS 4  Not Detected   BORDETELLA PERTUSSIS PCR  Not Detected   CHLAMYDOPHILA PNEUMONIAE PCR  Not Detected   MYCOPLAMA PNEUMO PCR  Not Detected   INFLUENZA A PCR  Not Detected   RSV, PCR  Not Detected               Imaging:  Imaging Results (Last 24 Hours)       ** No results found for the last 24 hours. **               I reviewed the patient's new clinical results / labs / tests / procedures      Assessment/Plan     Active Hospital Problems    Diagnosis  POA    Shortness of breath [R06.02]  Yes      Resolved Hospital Problems   No resolved problems to display.           Respiratory viral infection in a patient with a history of COPD.  Chest x-ray with no infiltrate.  Respiratory PCR is negative.  No COPD exacerbation.  Will continue Pulmicort/Flonase/DuoNebs/Singulair.  Stable respiratory status.  Patient wears oxygen at the assisted living nightly.  Procalcitonin elevated will empirically cover with p.o.  Zithromax.  Hypokalemia/hypocalcemia/hypomagnesemia.  Normal magnesium.  Resolved with substitution.  Type 2 diabetes.  A1c 6.8.  Will continue sliding scale insulin.  Chronic combined congestive heart failure/A-fib and sick sinus syndrome status post pacer/coronary artery disease.  There is no acute exacerbation of the heart failure.  The proBNP is mildly elevated.  2D echo with an ejection fraction of 41 to 45% with grade 2 diastolic dysfunction and left ventricular hypertrophy (improved ejection fraction than before).  Continue Coreg/anticoagulation with Coumadin/Crestor.  Patient developed swelling to ACE inhibitors before.  Awaiting back.  CLL/leukocytosis/thrombocytopenia/anemia.  All clinically and laboratory stable.  VTE prophylaxis.  Patient anticoagulated.      Discussed my findings and plan of treatment with the patient.  Disposition.  Back to independent living tomorrow if okay with cardiology.        Krystina Kline MD  [facility]  08/24/24  13:59 EDT

## 2024-08-24 NOTE — PLAN OF CARE
Goal Outcome Evaluation:  Plan of Care Reviewed With: patient           Outcome Evaluation: Pt admitted from ind. living facility with viral illness, h/o CHF, COPD, and CLL.   Pt reports using RW in apt and rollator when out, denies recent falls, walks to dining barreto,  on room air normally.  Today pt demonstrates generalized weakness and impaired gait pattern fromb aseline but able toambulate 15' w/ contact assist using RW.  Recommend using bathroom instead of purewick and sitting inchair for meals.  Recommend DC back to ind. [facility], pt declines needs for any home PT.      Anticipated Discharge Disposition (PT):  (back to independent living)

## 2024-08-24 NOTE — THERAPY EVALUATION
Patient Name: Caitlyn Longoria  : 1934    MRN: [id number]                              Today's Date: 2024       Admit Date: 2024    Visit Dx:     ICD-10-CM ICD-9-CM   1. Shortness of breath  R06.02 786.05   2. Acute heart failure, unspecified heart failure type  I50.9 428.9   3. Stage 3 chronic kidney disease, unspecified whether stage 3a or 3b CKD  N18.30 585.3   4. Chronic obstructive pulmonary disease, unspecified COPD type  J44.9 496   5. Hypokalemia  E87.6 276.8     Patient Active Problem List   Diagnosis    Neck and shoulder pain    Arthropathy of shoulder region    Carpal tunnel syndrome of left wrist    Chronic pain of both shoulders    Chronic left shoulder pain    Arthropathy of left shoulder    Dysarthria    DM II (diabetes mellitus, type II), controlled    Paroxysmal atrial fibrillation    HLD (hyperlipidemia)    Chronic bronchitis    Coronary artery disease involving native coronary artery of native heart with angina pectoris    CVA (cerebral vascular accident)    HTN (hypertension)    CKD (chronic kidney disease), stage III    Chronic combined systolic and diastolic congestive heart failure    Infection of prosthetic right knee joint    Stasis dermatitis of right lower extremity due to peripheral venous hypertension    Current use of long term anticoagulation    Morbid obesity    Acute respiratory failure with hypoxia    Rhinovirus    Asthma with acute exacerbation    Acute respiratory failure with hypoxemia    Viral pneumonia    Hypoxia    CLL (chronic lymphoid leukemia) in relapse    Dyspnea    Anticoagulated on Coumadin    Osteoporotic compression fracture of spine    Pneumonia due to gram-negative bacteria    Pneumonia due to infectious organism    Bilateral carotid artery disease    Hypogammaglobulinemia    Hypogammaglobulinemia    Cellulitis of right lower extremity    Hypokalemia    Nocturnal hypoxia    COPD (chronic obstructive pulmonary disease)    Shortness of breath      Past Medical History:   Diagnosis Date    Aortic calcification     mild, on echo 12/17/2017    Aortic regurgitation     Trace    Asthma     Atrial fibrillation     CAD (coronary artery disease)     Carpal tunnel syndrome of left wrist     Chronic combined systolic and diastolic congestive heart failure     CKD (chronic kidney disease) stage 3, GFR 30-59 ml/min     COPD (chronic obstructive pulmonary disease)     Coronary artery disease involving native coronary artery of native heart with angina pectoris     Disc degeneration, lumbar     Diverticulosis     DM type 2 (diabetes mellitus, type 2)     GERD (gastroesophageal reflux disease)     History of aneurysm     right femoral artery s/p LHC    History of blood transfusion     History of fracture     History of heart attack     History of vitamin D deficiency     Hyperlipidemia     Hypertension     Leukemia     Mild mitral regurgitation     Mitral annular calcification     12/8/2017- echo, moderate    Osteopenia     PAF (paroxysmal atrial fibrillation)     Peripheral neuropathy     Skin cancer     Left hand    Sleep apnea     bipap    SSS (sick sinus syndrome)     Stroke (cerebrum)     TIA (transient ischemic attack) 2017    Tricuspid regurgitation     Trace     Past Surgical History:   Procedure Laterality Date    BRONCHOSCOPY Bilateral 10/6/2020    Procedure: BRONCHOSCOPY with BILATERAL LUNG washings;  Surgeon: Juno Coburn MD;  Location: [facility] ENDOSCOPY;  Service: Pulmonary;  Laterality: Bilateral;  PRE: purulent bronchitis  POST: PURULENT BRONCHITIS    BRONCHOSCOPY Bilateral 10/9/2020    Procedure: BRONCHOSCOPY with washing;  Surgeon: Juno Coburn MD;  Location: [facility] ENDOSCOPY;  Service: Pulmonary;  Laterality: Bilateral;  pre/post - mucous plug      CARDIAC CATHETERIZATION      CARDIAC ELECTROPHYSIOLOGY PROCEDURE N/A 2/7/2020    Procedure: PPM generator change - dual  medtronic;  Surgeon: Kiel Field MD;  Location: [facility] CATH INVASIVE  LOCATION;  Service: Cardiology;  Laterality: N/A;    CHOLECYSTECTOMY      CORONARY STENT PLACEMENT      ENDOSCOPY N/A 9/14/2022    Procedure: ESOPHAGOGASTRODUODENOSCOPY with 54fr main dilatation;  Surgeon: Enio Cota MD;  Location: [facility] ENDOSCOPY;  Service: Gastroenterology;  Laterality: N/A;  pre - dysphagia  post - s/p dilatation, watermelon stomach    HERNIA REPAIR      hital hernia    HYSTERECTOMY      PACEMAKER IMPLANTATION      REPLACEMENT TOTAL KNEE Bilateral       General Information       Row Name 08/24/24 1040          Physical Therapy Time and Intention    Document Type evaluation  -AR     Mode of Treatment physical therapy  -AR       Row Name 08/24/24 1040          General Information    Patient Profile Reviewed yes  -AR     Prior Level of Function min assist:  ind. living, RW in home, rollator when out, denies falls, no assist w/ dressing/bathing; has a long walk to dining room -walks there, room air at baseline  -AR     Existing Precautions/Restrictions fall  -AR     Barriers to Rehab none identified  -AR       Row Name 08/24/24 1040          Living Environment    People in Home facility resident  -AR       Row Name 08/24/24 1040          Home Main Entrance    Number of Stairs, Main Entrance none  -AR       Row Name 08/24/24 1040          Cognition    Orientation Status (Cognition) oriented x 4  -AR       Row Name 08/24/24 1040          Safety Issues, Functional Mobility    Impairments Affecting Function (Mobility) balance;endurance/activity tolerance;strength  -AR               User Key  (r) = Recorded By, (t) = Taken By, (c) = Cosigned By      Initials Name Provider Type    AR Leonor Shabazz PT Physical Therapist                   Mobility       Row Name 08/24/24 1041          Bed Mobility    Bed Mobility supine-sit;sit-supine  -AR     Supine-Sit [place] (Bed Mobility) standby assist  -AR     Sit-Supine [place] (Bed Mobility) minimum assist (75% patient effort)  -AR      Assistive Device (Bed Mobility) head of bed elevated  -AR       Row Name 08/24/24 1041          Sit-Stand Transfer    Sit-Stand [place] (Transfers) contact guard  -AR     Assistive Device (Sit-Stand Transfers) walker, front-wheeled  -AR       Row Name 08/24/24 1041          Gait/Stairs (Locomotion)    [place] Level (Gait) contact guard  -AR     Assistive Device (Gait) walker, front-wheeled  -AR     Distance in Feet (Gait) 15  -AR     Deviations/Abnormal Patterns (Gait) festinating/shuffling;jeremi decreased  -AR     Bilateral Gait Deviations heel strike decreased;forward flexed posture  -AR               User Key  (r) = Recorded By, (t) = Taken By, (c) = Cosigned By      Initials Name Provider Type    Leonor Markham, PT Physical Therapist                   Obj/Interventions       Row Name 08/24/24 1042          Range of Motion Comprehensive    Comment, General Range of Motion B LE WFL  -AR       Row Name 08/24/24 1042          Strength Comprehensive (MMT)    Comment, General Manual Muscle Testing (MMT) Assessment B LE 4/5  -AR       Row Name 08/24/24 1042          Balance    Balance Assessment standing dynamic balance  -AR     Dynamic Standing Balance contact guard  -AR     Position/Device Used, Standing Balance walker, rolling  -AR               User Key  (r) = Recorded By, (t) = Taken By, (c) = Cosigned By      Initials Name Provider Type    Leonor Markham, PT Physical Therapist                   Goals/Plan       Row Name 08/24/24 1045          Bed Mobility Goal 1 (PT)    Activity/Assistive Device (Bed Mobility Goal 1, PT) bed mobility activities, all  -AR     [place] Level/Cues Needed (Bed Mobility Goal 1, PT) modified independence  -AR     Time Frame (Bed Mobility Goal 1, PT) 1 week  -AR       Row Name 08/24/24 1045          Transfer Goal 1 (PT)    Activity/Assistive Device (Transfer Goal 1, PT) sit-to-stand/stand-to-sit;bed-to-chair/chair-to-bed;walker, rolling  -AR     [place]  Level/Cues Needed (Transfer Goal 1, PT) standby assist  -AR     Time Frame (Transfer Goal 1, PT) 1 week  -AR       Row Name 08/24/24 1045          Gait Training Goal 1 (PT)    Activity/Assistive Device (Gait Training Goal 1, PT) gait (walking locomotion);walker, rolling  -AR     [place] Level (Gait Training Goal 1, PT) standby assist  -AR     Distance (Gait Training Goal 1, PT) 150  -AR     Time Frame (Gait Training Goal 1, PT) 1 week  -AR       Row Name 08/24/24 1045          Therapy Assessment/Plan (PT)    Planned Therapy Interventions (PT) balance training;bed mobility training;gait training;home exercise program;patient/family education;neuromuscular re-education;transfer training;strengthening;ROM (range of motion)  -AR               User Key  (r) = Recorded By, (t) = Taken By, (c) = Cosigned By      Initials Name Provider Type    AR Leonor Shabazz, PT Physical Therapist                   Clinical Impression       Row Name 08/24/24 1042          Pain    Pretreatment Pain Rating 0/10 - no pain  -AR     Posttreatment Pain Rating 3/10  -AR     Pain Location - Side/Orientation Left  -AR     Pain Location lower  -AR     Pain Location - extremity  -AR     Pain Intervention(s) Medication (See MAR);Repositioned  -AR       Row Name 08/24/24 1042          Plan of Care Review    Plan of Care Reviewed With patient  -AR     Outcome Evaluation Pt admitted from ind. living facility with viral illness, h/o CHF, COPD, and CLL.   Pt reports using RW in apt and rollator when out, denies recent falls, walks to dining barreto,  on room air normally.  Today pt demonstrates generalized weakness and impaired gait pattern fromb aseline but able toambulate 15' w/ contact assist using RW.  Recommend using bathroom instead of purewick and sitting inchair for meals.  Recommend DC back to [facility] living, pt declines needs for any home PT.  -AR       Row Name 08/24/24 1042          Therapy Assessment/Plan (PT)    Rehab Potential (PT) good,  to achieve stated therapy goals  -AR     Criteria for Skilled Interventions Met (PT) yes  -AR     Therapy Frequency (PT) 5 times/wk  -AR       Row Name 08/24/24 1042          Vital Signs    Pre SpO2 (%) 98  -AR     O2 Delivery Pre Treatment supplemental O2  -AR     Intra SpO2 (%) 95  -AR     O2 Delivery Intra Treatment room air  -AR     Post SpO2 (%) 97  -AR     O2 Delivery Post Treatment supplemental O2  -AR       Row Name 08/24/24 1042          Positioning and Restraints    Pre-Treatment Position in bed  -AR     Post Treatment Position --  transport  -AR               User Key  (r) = Recorded By, (t) = Taken By, (c) = Cosigned By      Initials Name Provider Type    AR Leonor Shabazz, PT Physical Therapist                   Outcome Measures       Row Name 08/24/24 1045          How much help from another person do you currently need...    Turning from your back to your side while in flat bed without using bedrails? 4  -AR     Moving from lying on back to sitting on the side of a flat bed without bedrails? 4  -AR     Moving to and from a bed to a chair (including a wheelchair)? 3  -AR     Standing up from a chair using your arms (e.g., wheelchair, bedside chair)? 3  -AR     Climbing 3-5 steps with a railing? 2  -AR     To walk in hospital room? 3  -AR     AM-PAC 6 Clicks Score (PT) 19  -AR     Highest Level of Mobility Goal 6 --> Walk 10 steps or more  -AR       Row Name 08/24/24 1045          Functional Assessment    Outcome Measure Options AM-PAC 6 Clicks Basic Mobility (PT)  -AR               User Key  (r) = Recorded By, (t) = Taken By, (c) = Cosigned By      Initials Name Provider Type    Leonor Markham PT Physical Therapist                                 Physical Therapy Education       Title: PT OT SLP Therapies (In Progress)       Topic: Physical Therapy (In Progress)       Point: Mobility training (In Progress)       Learning Progress Summary             Patient Acceptance, E, NR by AR at 8/24/2024  1046                         Point: Home exercise program (In Progress)       Learning Progress Summary             Patient Acceptance, E, NR by AR at 8/24/2024 1046                         Point: Body mechanics (In Progress)       Learning Progress Summary             Patient Acceptance, E, NR by AR at 8/24/2024 1046                         Point: Precautions (In Progress)       Learning Progress Summary             Patient Acceptance, E, NR by AR at 8/24/2024 1046                                         User Key       Initials Effective Dates Name Provider Type Discipline    AR 06/16/21 -  Leonor Shabazz, PT Physical Therapist PT                  PT Recommendation and Plan  Planned Therapy Interventions (PT): balance training, bed mobility training, gait training, home exercise program, patient/family education, neuromuscular re-education, transfer training, strengthening, ROM (range of motion)  Plan of Care Reviewed With: patient  Outcome Evaluation: Pt admitted from ind. [facility] facility with viral illness, h/o CHF, COPD, and CLL.   Pt reports using RW in apt and rollator when out, denies recent falls, walks to dining barreto,  on room air normally.  Today pt demonstrates generalized weakness and impaired gait pattern fromb aseline but able toambulate 15' w/ contact assist using RW.  Recommend using bathroom instead of purewick and sitting inchair for meals.  Recommend [facility] back to [facility], pt declines needs for any home PT.     Time Calculation:         PT Charges       Row Name 08/24/24 1040             Time Calculation    Start Time 1005  -AR      Stop Time 1024  -AR      Time Calculation (min) 19 min  -AR      PT Received On 08/24/24  -AR      PT - Next Appointment 08/26/24  -AR      PT Goal Re-Cert Due Date 08/31/24  -AR                User Key  (r) = Recorded By, (t) = Taken By, (c) = Cosigned By      Initials Name Provider Type    AR Leonor Shabazz, PT Physical Therapist                  Therapy Charges  for Today       Code Description Service Date Service Provider Modifiers Qty    62151814495 HC PT EVAL MOD COMPLEXITY 3 8/24/2024 Leonor Shabazz, PT GP 1    22545183500 HC PT THER PROC EA 15 MIN 8/24/2024 Leonor Shabazz, PT GP 1            PT G-Codes  Outcome Measure Options: AM-PAC 6 Clicks Basic Mobility (PT)  AM-PAC 6 Clicks Score (PT): 19  PT Discharge Summary  Anticipated Discharge Disposition (PT):  (back to independent living)    Leonor Shabazz PT  8/24/2024

## 2024-08-25 ENCOUNTER — READMISSION MANAGEMENT (OUTPATIENT)
Dept: CALL CENTER | Facility: HOSPITAL | Age: 89
End: 2024-08-25
Payer: MEDICARE

## 2024-08-25 VITALS
HEART RATE: 80 BPM | TEMPERATURE: 98.2 F | HEIGHT: 63 IN | WEIGHT: 176.8 LBS | SYSTOLIC BLOOD PRESSURE: 115 MMHG | RESPIRATION RATE: 18 BRPM | BODY MASS INDEX: 31.33 KG/M2 | DIASTOLIC BLOOD PRESSURE: 55 MMHG | OXYGEN SATURATION: 99 %

## 2024-08-25 PROBLEM — I70.0 TYPE 2 DIABETES MELLITUS WITH ATHEROSCLEROSIS OF AORTA: Status: ACTIVE | Noted: 2017-12-07

## 2024-08-25 PROBLEM — J44.9 COPD (CHRONIC OBSTRUCTIVE PULMONARY DISEASE): Status: ACTIVE | Noted: 2024-08-25

## 2024-08-25 PROBLEM — E87.6 HYPOKALEMIA: Status: RESOLVED | Noted: 2024-08-25 | Resolved: 2024-08-25

## 2024-08-25 PROBLEM — D69.6 THROMBOCYTOPENIA: Status: ACTIVE | Noted: 2024-08-25

## 2024-08-25 PROBLEM — J06.9 UPPER RESPIRATORY TRACT INFECTION: Status: ACTIVE | Noted: 2024-08-25

## 2024-08-25 PROBLEM — E83.51 HYPOCALCEMIA: Status: RESOLVED | Noted: 2024-08-25 | Resolved: 2024-08-25

## 2024-08-25 PROBLEM — E11.51 TYPE 2 DIABETES MELLITUS WITH ATHEROSCLEROSIS OF AORTA: Status: ACTIVE | Noted: 2017-12-07

## 2024-08-25 PROBLEM — I49.5 SICK SINUS SYNDROME: Status: ACTIVE | Noted: 2024-08-25

## 2024-08-25 PROBLEM — E83.42 HYPOMAGNESEMIA: Status: ACTIVE | Noted: 2024-08-25

## 2024-08-25 PROBLEM — E87.6 HYPOKALEMIA: Status: ACTIVE | Noted: 2024-08-25

## 2024-08-25 PROBLEM — E83.42 HYPOMAGNESEMIA: Status: RESOLVED | Noted: 2024-08-25 | Resolved: 2024-08-25

## 2024-08-25 PROBLEM — I50.22 CHRONIC HFREF (HEART FAILURE WITH REDUCED EJECTION FRACTION): Status: ACTIVE | Noted: 2024-08-25

## 2024-08-25 PROBLEM — R06.02 SHORTNESS OF BREATH: Status: RESOLVED | Noted: 2024-08-22 | Resolved: 2024-08-25

## 2024-08-25 PROBLEM — E83.51 HYPOCALCEMIA: Status: ACTIVE | Noted: 2024-08-25

## 2024-08-25 PROBLEM — D64.9 ANEMIA: Status: ACTIVE | Noted: 2024-08-25

## 2024-08-25 PROBLEM — E11.59 TYPE 2 DIABETES MELLITUS WITH ATHEROSCLEROSIS OF AORTA: Status: ACTIVE | Noted: 2017-12-07

## 2024-08-25 LAB
ANION GAP SERPL CALCULATED.3IONS-SCNC: 8 MMOL/L (ref 5–15)
BUN SERPL-MCNC: 19 MG/DL (ref 8–23)
BUN/CREAT SERPL: 24.1 (ref 7–25)
CA-I SERPL ISE-MCNC: 1.22 MMOL/L (ref 1.15–1.35)
CALCIUM SPEC-SCNC: 8.9 MG/DL (ref 8.6–10.5)
CHLORIDE SERPL-SCNC: 102 MMOL/L (ref 98–107)
CO2 SERPL-SCNC: 27 MMOL/L (ref 22–29)
CREAT SERPL-MCNC: 0.79 MG/DL (ref 0.57–1)
DEPRECATED RDW RBC AUTO: 48.7 FL (ref 37–54)
EGFRCR SERPLBLD CKD-EPI 2021: 71.6 ML/MIN/1.73
ERYTHROCYTE [DISTWIDTH] IN BLOOD BY AUTOMATED COUNT: 12.9 % (ref 12.3–15.4)
GLUCOSE BLDC GLUCOMTR-MCNC: 102 MG/DL (ref 70–130)
GLUCOSE BLDC GLUCOMTR-MCNC: 228 MG/DL (ref 70–130)
GLUCOSE SERPL-MCNC: 108 MG/DL (ref 65–99)
HCT VFR BLD AUTO: 36.9 % (ref 34–46.6)
HGB BLD-MCNC: 11.5 G/DL (ref 12–15.9)
INR PPP: 1.98 (ref 0.9–1.1)
LYMPHOCYTES # BLD MANUAL: 9.08 10*3/MM3 (ref 0.7–3.1)
LYMPHOCYTES NFR BLD MANUAL: 1 % (ref 5–12)
MAGNESIUM SERPL-MCNC: 2.1 MG/DL (ref 1.6–2.4)
MCH RBC QN AUTO: 31.6 PG (ref 26.6–33)
MCHC RBC AUTO-ENTMCNC: 31.2 G/DL (ref 31.5–35.7)
MCV RBC AUTO: 101.4 FL (ref 79–97)
MONOCYTES # BLD: 0.16 10*3/MM3 (ref 0.1–0.9)
NEUTROPHILS # BLD AUTO: 6.42 10*3/MM3 (ref 1.7–7)
NEUTROPHILS NFR BLD MANUAL: 41 % (ref 42.7–76)
PHOSPHATE SERPL-MCNC: 3.3 MG/DL (ref 2.5–4.5)
PLAT MORPH BLD: NORMAL
PLATELET # BLD AUTO: 115 10*3/MM3 (ref 140–450)
PMV BLD AUTO: 10.1 FL (ref 6–12)
POTASSIUM SERPL-SCNC: 4.5 MMOL/L (ref 3.5–5.2)
PROTHROMBIN TIME: 22.8 SECONDS (ref 11.7–14.2)
RBC # BLD AUTO: 3.64 10*6/MM3 (ref 3.77–5.28)
RBC MORPH BLD: NORMAL
SMUDGE CELLS BLD QL SMEAR: ABNORMAL
SODIUM SERPL-SCNC: 137 MMOL/L (ref 136–145)
VARIANT LYMPHS NFR BLD MANUAL: 1 % (ref 0–5)
VARIANT LYMPHS NFR BLD MANUAL: 57 % (ref 19.6–45.3)
WBC NRBC COR # BLD AUTO: 15.65 10*3/MM3 (ref 3.4–10.8)

## 2024-08-25 PROCEDURE — 82330 ASSAY OF CALCIUM: CPT | Performed by: INTERNAL MEDICINE

## 2024-08-25 PROCEDURE — 94799 UNLISTED PULMONARY SVC/PX: CPT

## 2024-08-25 PROCEDURE — 85610 PROTHROMBIN TIME: CPT | Performed by: HOSPITALIST

## 2024-08-25 PROCEDURE — 94664 DEMO&/EVAL PT USE INHALER: CPT

## 2024-08-25 PROCEDURE — 83735 ASSAY OF MAGNESIUM: CPT | Performed by: INTERNAL MEDICINE

## 2024-08-25 PROCEDURE — 85025 COMPLETE CBC W/AUTO DIFF WBC: CPT | Performed by: INTERNAL MEDICINE

## 2024-08-25 PROCEDURE — 82948 REAGENT STRIP/BLOOD GLUCOSE: CPT

## 2024-08-25 PROCEDURE — 85007 BL SMEAR W/DIFF WBC COUNT: CPT | Performed by: INTERNAL MEDICINE

## 2024-08-25 PROCEDURE — 84100 ASSAY OF PHOSPHORUS: CPT | Performed by: INTERNAL MEDICINE

## 2024-08-25 PROCEDURE — 94761 N-INVAS EAR/PLS OXIMETRY MLT: CPT

## 2024-08-25 PROCEDURE — 80048 BASIC METABOLIC PNL TOTAL CA: CPT | Performed by: INTERNAL MEDICINE

## 2024-08-25 PROCEDURE — 94760 N-INVAS EAR/PLS OXIMETRY 1: CPT

## 2024-08-25 RX ORDER — AZITHROMYCIN 250 MG/1
TABLET, FILM COATED ORAL
Qty: 2 TABLET | Refills: 0 | Status: SHIPPED | OUTPATIENT
Start: 2024-08-25 | End: 2024-08-26

## 2024-08-25 RX ADMIN — CALCIUM CARBONATE-VITAMIN D TAB 500 MG-200 UNIT 1 TABLET: 500-200 TAB at 08:43

## 2024-08-25 RX ADMIN — CARVEDILOL 3.12 MG: 3.12 TABLET, FILM COATED ORAL at 08:43

## 2024-08-25 RX ADMIN — IPRATROPIUM BROMIDE AND ALBUTEROL SULFATE 3 ML: .5; 3 SOLUTION RESPIRATORY (INHALATION) at 10:45

## 2024-08-25 RX ADMIN — BUDESONIDE 0.5 MG: 0.5 INHALANT RESPIRATORY (INHALATION) at 06:48

## 2024-08-25 RX ADMIN — IPRATROPIUM BROMIDE AND ALBUTEROL SULFATE 3 ML: .5; 3 SOLUTION RESPIRATORY (INHALATION) at 06:50

## 2024-08-25 RX ADMIN — OXYBUTYNIN CHLORIDE 5 MG: 5 TABLET, EXTENDED RELEASE ORAL at 08:43

## 2024-08-25 NOTE — DISCHARGE SUMMARY
Patient Name: Caitlyn Longoria  : 1934  MRN: [id number]    Date of Admission: 2024  Date of Discharge:  2024  Primary Care Physician: Zenaida Suarez MD      Discharge Diagnoses     Active Hospital Problems    Diagnosis  POA    **Upper respiratory tract infection [J06.9]  Yes    COPD (chronic obstructive pulmonary disease) [J44.9]  Yes    Sick sinus syndrome [I49.5]  Yes    Anemia [D64.9]  Yes    Thrombocytopenia [D69.6]  Yes    Chronic HFrEF (heart failure with reduced ejection fraction) [I50.22]  Yes    CLL (chronic lymphocytic leukemia) [C91.10]  Yes    Chronic combined systolic (congestive) and diastolic (congestive) heart failure [I50.42]  Yes    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  Please refer to the H&P for full details.  An 89 years old female with a past history of CLL/combined congestive heart failure/COPD/type 2 diabetes/A-fib and sick sinus syndrome/coronary artery disease/anemia and thrombocytopenia who presented to the emergency department with increasing cough associated with chills and weakness.  Her physical examination on admission included an afebrile patient with stable vital signs/obesity/harsh bronchial breathing in the chest.  Hospital course.  Initial ER evaluation included an ionized calcium that was low.  Magnesium was low at 1.5.  INR elevated at 2.68.  Procalcitonin elevated at 4.8.  CBC was normal except a white count of 18.4 and hemoglobin 11.8 and platelets of 100.  Respiratory PCR panel was negative.  Troponin elevated at 71.  proBNP elevated at 4294.  CMP normal except a glucose  of 128, potassium 2.8, total protein 5.8, total bilirubin 1.7.  Phosphorus was low at 2.3.  Chest x-ray with old left hemidiaphragm elevation and stable cardiac enlargement with presence of biventricular pacer.  Hospital course will be summarized in a problem oriented manner as below.  Respiratory infection mostly filar in a patient with a past history of COPD.  Chest x-ray was without infiltrate.  Respiratory PCR panel was negative.  There was no evidence of COPD exacerbation.  Patient has home oxygen to wear at night.  Procalcitonin and white count were elevated.  Patient was started on Zithromax/Pulmicort/Flonase/DuoNebs/Singulair.  Her respiratory status and cough has greatly improved with improvement of her leukocytosis.  She was weaned off oxygen during her hospitalization.  Hypokalemia/hypocalcemia/hypomagnesemia/hypophosphatemia.  All resolved with substitution by the time of discharge.  Achiness has improved.  Type 2 diabetes.  A1c was 6.8.  Sliding scale was maintained during this admission.  Chronic combined congestive heart failure/A-fib and sick sinus syndrome status post pacer/coronary artery disease.  There was no evidence of angina or congestive heart failure during this admission.  proBNP was mildly elevated.  A 2D echo revealed an ejection fraction of 41 to 45% with grade 2 diastolic dysfunction and left ventricular hypertrophy (improved ejection fraction from before).  Patient was maintained on Coreg/anticoagulation with Coumadin/Crestor.  She has been allergic with angioedema to ACE inhibitors and this was not employed.  Chronic lymphocytic leukemia/thrombocytopenia/anemia.  All remained stable during this admission with no active bleed.    At the time of discharge patient had minimal cough and was hemodynamically stable.  She is to follow-up with primary MD/cardiology/hematology oncology/pulmonary.  Patient is to have her INR checked in 3 days with primary MD.    Consultants     Consult Orders  (all) (From admission, onward)       Start     Ordered    08/22/24 1742  Inpatient Cardiology Consult  Once        Specialty:  Cardiology  Provider:  Mariposa Lane MD    08/22/24 1742    08/22/24 1423  Inpatient Palliative Care Team Consult  Once,   Status:  Canceled        Provider:  (Not yet assigned)    08/22/24 1422    08/22/24 1422  Inpatient Palliative Care Team Consult  Once,   Status:  Canceled        Provider:  (Not yet assigned)    08/22/24 1422    08/22/24 1402  [facility] (on-call MD unless specified) Details  Once,   Status:  Canceled        Specialty:  Hospitalist  Provider:  Rd Yoo MD    08/22/24 1401                  Procedures     Imaging Results (All)       Procedure Component Value Units Date/Time    XR Chest 1 View [[id number]] Collected: 08/22/24 1201     Updated: 08/22/24 1207    Narrative:      XR CHEST 1 VW-     INDICATION: Shortness of breath     COMPARISON: Chest radiographs dating back to 12/7/2017. CT abdomen  pelvis 3/26/2014       Impression:      Unchanged pronounced left hemidiaphragm elevation. Stable  enlarged cardiac silhouette with biventricular pacemaker. No focal  consolidation. No pleural effusion or pneumothorax. Evaluation for  pleural effusion is limited on the left given hemidiaphragm elevation.  No focal osseous abnormality.       This report was finalized on 8/22/2024 12:04 PM by Dr. Sergey Crump M.D on Workstation: [id number]               Pertinent Labs     Results from last 7 days   Lab Units 08/25/24  0655 08/24/24  0537 08/23/24  0614 08/22/24  1146   WBC 10*3/mm3 15.65* 16.83* 16.14* 18.46*   HEMOGLOBIN g/dL 11.5* 11.2* 12.1 11.8*   PLATELETS 10*3/mm3 115* 110* 114* 100*     Results from last 7 days   Lab Units 08/25/24  0655 08/24/24  2155 08/24/24  0537 08/23/24  0614 08/22/24  1419   SODIUM mmol/L 137  --  137 140 140   POTASSIUM mmol/L 4.5 4.5 3.6 3.8 2.5*   CHLORIDE mmol/L 102  --  101 103 108*   CO2 mmol/L 27.0  --  26.9 27.0 21.0*   BUN mg/dL 19   "--  19 18 17   CREATININE mg/dL 0.79  --  0.77 0.79 0.66   GLUCOSE mg/dL 108*  --  97 108* 59*   Estimated Creatinine Clearance: 48.4 mL/min (by C-G formula based on SCr of 0.79 mg/dL).  Results from last 7 days   Lab Units 08/24/24  0537 08/23/24  0614 08/22/24  1146   ALBUMIN g/dL 3.2* 3.4* 3.6   BILIRUBIN mg/dL 1.4* 1.4* 1.7*   ALK PHOS U/L 90 91 82   AST (SGOT) U/L 22 22 26   ALT (SGPT) U/L 15 15 20     Results from last 7 days   Lab Units 08/25/24  0655 08/24/24  0537 08/23/24  0614 08/22/24  1419 08/22/24  1146   CALCIUM mg/dL 8.9 8.5* 8.8 7.7* 8.7   ALBUMIN g/dL  --  3.2* 3.4*  --  3.6   MAGNESIUM mg/dL 2.1  --  2.4  --  1.5*   PHOSPHORUS mg/dL 3.3  --  2.3* 2.3*  --        Results from last 7 days   Lab Units 08/22/24  1419 08/22/24  1146   HSTROP T ng/L 56* 71*   PROBNP pg/mL  --  4,294.0*           Invalid input(s): \"LDLCALC\"      Imaging Results (Last 24 Hours)       ** No results found for the last 24 hours. **            Test Results Pending at Discharge         Discharge Exam   Physical Exam  Vitals.  Temperature 98.2 a pulse of 80 respirate rate of 18 and blood pressure 115/55 and O2 sats of 99% on room air  General.  Elderly female.  She is alert and oriented x 4.  No apparent pain/distress/diaphoresis.  Normal mood and affect.  Eyes.  Pupils equal round and reactive.  Intact extraocular musculature.  No pallor or jaundice.  Oral cavity.  Moist mucous membrane.  Neck.  Supple.  No JVD.  No lymphadenopathy or thyromegaly.  Cardio vascular.  Regular rate and rhythm and grade 2 systolic murmur.  Chest.  Clear to auscultation bilaterally with no added sounds.  Harsh bronchial breathing bilaterally.  Abdomen.  Soft lax.  No tenderness.  No organomegaly.  No guarding or rebound.  Extremities.  No clubbing/cyanosis/edema.  Dressed wound of the left leg.  CNS.  No acute focal neurologic deficits.     Discharge Details        Discharge Medications        New Medications        Instructions Start Date "   azithromycin 250 MG tablet  Commonly known as: ZITHROMAX   Indications: Upper Respiratory Tract Infection             Changes to Medications        Instructions Start Date   calcium 500 mg vitamin D 5 mcg (200 UT) 500-5 MG-MCG tablet per tablet  What changed:   medication strength  when to take this   1 tablet, Oral, 2 Times Daily      furosemide 20 MG tablet  Commonly known as: LASIX  What changed: when to take this   20 mg, Oral, As Needed      warfarin 4 MG tablet  Commonly known as: COUMADIN  What changed: See the new instructions.   TAKE ONE-HALF (1/2) TABLET ON SUNDAY AND FRIDAY AND TAKE ONE TABLET ALL OTHER DAYS OR AS DIRECTED             Continue These Medications        Instructions Start Date   acetaminophen 325 MG tablet  Commonly known as: TYLENOL   650 mg, Oral, Every 4 Hours PRN      albuterol (2.5 MG/3ML) 0.083% nebulizer solution  Commonly known as: PROVENTIL   2.5 mg, Nebulization, Every 4 Hours      albuterol sulfate  (90 Base) MCG/ACT inhaler  Commonly known as: PROVENTIL HFA;VENTOLIN HFA;PROAIR HFA   2 puffs, Inhalation, Every 4 Hours PRN      budesonide 0.5 MG/2ML nebulizer solution  Commonly known as: PULMICORT   0.5 mg, Nebulization, 2 Times Daily      carvedilol 3.125 MG tablet  Commonly known as: COREG   3.125 mg, Oral, 2 Times Daily      Cetirizine HCl 10 MG capsule   Oral      FASENRA SC   Subcutaneous, Gets one shot a month, next shot may 13 then one every 8 weeks       fluticasone 50 MCG/ACT nasal spray  Commonly known as: FLONASE   1 spray, Nasal, Daily      glipizide 5 MG tablet  Commonly known as: GLUCOTROL   5 mg, Oral, Take one half tablet by mouth daily      melatonin 5 MG tablet tablet   5 mg, Oral, Nightly      metFORMIN  MG 24 hr tablet  Commonly known as: GLUCOPHAGE-XR   500 mg, Oral, Daily With Breakfast      montelukast 10 MG tablet  Commonly known as: SINGULAIR   10 mg, Oral, Nightly      oxybutynin XL 10 MG 24 hr tablet  Commonly known as: DITROPAN-XL   10  mg, Oral, 2 Times Daily      rosuvastatin 20 MG tablet  Commonly known as: CRESTOR   20 mg, Oral, Nightly      Yupelri 175 MCG/3ML nebulizer solution  Generic drug: revefenacin   Nebulization, Daily - RT             Stop These Medications      cephalexin 500 MG capsule  Commonly known as: KEFLEX              Allergies   Allergen Reactions    Accupril [Quinapril Hcl] Swelling, Other (See Comments), GI Intolerance and Delirium     HA, constipation     Ahist [Chlorpheniramine] Nausea Only, Other (See Comments) and Dizziness     Headache, Blurred vision    Clarithromycin Nausea Only, Other (See Comments) and Mental Status Change     HA, Depression, Flushing    Esomeprazole GI Intolerance    Latex Other (See Comments)     Skin breakdown    Levalbuterol Swelling    Levocetirizine Diarrhea and GI Intolerance    Lipitor [Atorvastatin] Other (See Comments) and Myalgia     Dark urine    Omeprazole Nausea Only and Other (See Comments)     HA    Pravachol [Pravastatin] Nausea Only and GI Intolerance     Bloated, Constipation, HA    Sulindac Other (See Comments) and Myalgia     HA, joint pain, bruising    Valdecoxib Irritability    Chlorcyclizine Unknown - Low Severity    Diclofenac Sodium Unknown - Low Severity    Diphenhydramine Unknown - Low Severity    Lodine [Etodolac] Unknown - Low Severity    Sulfa Antibiotics Unknown - Low Severity         Discharge Disposition:  Condition: Stable    Diet: Healthy cardiac/diabetic a 2000-calorie    Activity:   Activity Instructions       Activity as Tolerated      Other Activity Instructions      Activity Instructions: Walker.    Up WIth Assist              Counseling : No nonsteroidal anti-inflammatory medication    CODE STATUS:    Code Status and Medical Interventions: CPR (Attempt to Resuscitate); Full Support   Ordered at: 08/22/24 0529     Code Status (Patient has no pulse and is not breathing):    CPR (Attempt to Resuscitate)     Medical Interventions (Patient has pulse or is  breathing):    Full Support       Future Appointments   Date Time Provider Department Center   8/30/2024  8:40 AM LAB CHAIR 1 [facility]  [facility] [place]   8/30/2024  9:00 AM Raina Rosado APRN [facility]   8/30/2024  9:30 AM CHAIR 11 [facility] - [facility] INFUS [facility]   9/27/2024  9:10 AM Shaun Cheng MD [facility]   2/20/2025  9:30 AM [facility] OP VAS RM 1 [facility]   2/20/2025 10:00 AM Patricia Steve APRN [facility]   4/11/2025  1:50 PM Mehul Miller MD [facility]   8/11/2025 10:00 AM [facility] [place] DEVICE CHECK [facility] CD [id number] None   8/11/2025 10:30 AM Jonah Regalado Jr., MD [facility]     Additional Instructions for the Follow-ups that You Need to Schedule       Call MD With Problems / Concerns   As directed      Instructions: Call MD or return to ER if chest pain/shortness of breath/fever or chills/nausea or vomiting/bleeding diathesis    Order Comments: Instructions: Call MD or return to ER if chest pain/shortness of breath/fever or chills/nausea or vomiting/bleeding diathesis         Discharge Follow-up with PCP   As directed       Currently Documented PCP:    Zenaida Suarez MD    PCP Phone Number:    [phone]     Follow Up Details: 1 week.  Respiratory viral infection/CAP/hypokalemia/hypomagnesemia/hypocalcemia/type 2 diabetes/combined chronic congestive heart failure/sick sinus syndrome/coronary artery disease/history of CLL/anemia/thrombocytosis        Discharge Follow-up with Specified Provider: Cardiology; 2 Weeks   As directed      To: Cardiology   Follow Up: 2 Weeks   Follow Up Details: Chronic combined congestive heart failure/atrial fibrillation and sick sinus syndrome/coronary artery disease        Discharge Follow-up with Specified Provider: Hematology oncology; 2 Weeks   As directed      To: Hematology oncology   Follow Up: 2 Weeks   Follow Up Details: CLL/anemia/thrombocytopenia        Discharge Follow-up with Specified  Provider: Pulmonary.; 2 Weeks   As directed      To: Pulmonary.   Follow Up: 2 Weeks   Follow Up Details: COPD.               Follow-up Information       Zenaida Suarez MD .    Specialty: Internal Medicine  Why: 1 week.  Respiratory viral infection/CAP/hypokalemia/hypomagnesemia/hypocalcemia/type 2 diabetes/combined chronic congestive heart failure/sick sinus syndrome/coronary artery disease/history of CLL/anemia/thrombocytosis  Contact information:  [address] [id number] [phone]                               Time Spent on Discharge:  Greater than 30 minutes      Krystina Kline MD  [facility]  08/25/24  17:48 EDT

## 2024-08-25 NOTE — PLAN OF CARE
Goal Outcome Evaluation:   Patient alert follows commands incont care and skin care provided. No c/o pain or nausea noted. No acute distress noted will continue to monitor

## 2024-08-26 ENCOUNTER — APPOINTMENT (OUTPATIENT)
Dept: CARDIOLOGY | Facility: HOSPITAL | Age: 89
DRG: 603 | End: 2024-08-26
Payer: MEDICARE

## 2024-08-26 ENCOUNTER — HOSPITAL ENCOUNTER (INPATIENT)
Facility: HOSPITAL | Age: 89
LOS: 7 days | Discharge: HOME | DRG: 603 | End: 2024-09-03
Attending: EMERGENCY MEDICINE | Admitting: INTERNAL MEDICINE
Payer: MEDICARE

## 2024-08-26 DIAGNOSIS — L03.116 CELLULITIS OF LEFT LOWER LEG: ICD-10-CM

## 2024-08-26 DIAGNOSIS — Z79.01 CHRONIC ANTICOAGULATION: ICD-10-CM

## 2024-08-26 DIAGNOSIS — L03.116 CELLULITIS OF LEFT LEG: Primary | ICD-10-CM

## 2024-08-26 DIAGNOSIS — E66.01 MORBID OBESITY: ICD-10-CM

## 2024-08-26 LAB
ALBUMIN SERPL-MCNC: 4.1 G/DL (ref 3.5–5.2)
ALBUMIN/GLOB SERPL: 1.2 G/DL
ALP SERPL-CCNC: 218 U/L (ref 39–117)
ALT SERPL W P-5'-P-CCNC: 175 U/L (ref 1–33)
ANION GAP SERPL CALCULATED.3IONS-SCNC: 13.6 MMOL/L (ref 5–15)
AST SERPL-CCNC: 217 U/L (ref 1–32)
BASOPHILS # BLD MANUAL: 0 10*3/MM3 (ref 0–0.2)
BASOPHILS NFR BLD MANUAL: 0 % (ref 0–1.5)
BH CV LOWER VASCULAR LEFT COMMON FEMORAL AUGMENT: NORMAL
BH CV LOWER VASCULAR LEFT COMMON FEMORAL COMPETENT: NORMAL
BH CV LOWER VASCULAR LEFT COMMON FEMORAL COMPRESS: NORMAL
BH CV LOWER VASCULAR LEFT COMMON FEMORAL PHASIC: NORMAL
BH CV LOWER VASCULAR LEFT COMMON FEMORAL SPONT: NORMAL
BH CV LOWER VASCULAR LEFT DISTAL FEMORAL COMPRESS: NORMAL
BH CV LOWER VASCULAR LEFT GASTRONEMIUS COMPRESS: NORMAL
BH CV LOWER VASCULAR LEFT GREATER SAPH AK COMPRESS: NORMAL
BH CV LOWER VASCULAR LEFT GREATER SAPH BK COMPRESS: NORMAL
BH CV LOWER VASCULAR LEFT LESSER SAPH COMPRESS: NORMAL
BH CV LOWER VASCULAR LEFT MID FEMORAL AUGMENT: NORMAL
BH CV LOWER VASCULAR LEFT MID FEMORAL COMPETENT: NORMAL
BH CV LOWER VASCULAR LEFT MID FEMORAL COMPRESS: NORMAL
BH CV LOWER VASCULAR LEFT MID FEMORAL PHASIC: NORMAL
BH CV LOWER VASCULAR LEFT MID FEMORAL SPONT: NORMAL
BH CV LOWER VASCULAR LEFT POPLITEAL AUGMENT: NORMAL
BH CV LOWER VASCULAR LEFT POPLITEAL COMPETENT: NORMAL
BH CV LOWER VASCULAR LEFT POPLITEAL COMPRESS: NORMAL
BH CV LOWER VASCULAR LEFT POPLITEAL PHASIC: NORMAL
BH CV LOWER VASCULAR LEFT POPLITEAL SPONT: NORMAL
BH CV LOWER VASCULAR LEFT POSTERIOR TIBIAL COMPRESS: NORMAL
BH CV LOWER VASCULAR LEFT PROFUNDA FEMORAL COMPRESS: NORMAL
BH CV LOWER VASCULAR LEFT PROXIMAL FEMORAL COMPRESS: NORMAL
BH CV LOWER VASCULAR LEFT SAPHENOFEMORAL JUNCTION COMPRESS: NORMAL
BH CV LOWER VASCULAR RIGHT COMMON FEMORAL AUGMENT: NORMAL
BH CV LOWER VASCULAR RIGHT COMMON FEMORAL COMPETENT: NORMAL
BH CV LOWER VASCULAR RIGHT COMMON FEMORAL COMPRESS: NORMAL
BH CV LOWER VASCULAR RIGHT COMMON FEMORAL PHASIC: NORMAL
BH CV LOWER VASCULAR RIGHT COMMON FEMORAL SPONT: NORMAL
BH CV VAS PRELIMINARY FINDINGS SCRIPTING: 1
BILIRUB SERPL-MCNC: 2.5 MG/DL (ref 0–1.2)
BUN SERPL-MCNC: 19 MG/DL (ref 8–23)
BUN/CREAT SERPL: 19 (ref 7–25)
CALCIUM SPEC-SCNC: 10.3 MG/DL (ref 8.6–10.5)
CHLORIDE SERPL-SCNC: 98 MMOL/L (ref 98–107)
CO2 SERPL-SCNC: 27.4 MMOL/L (ref 22–29)
CREAT SERPL-MCNC: 1 MG/DL (ref 0.57–1)
D-LACTATE SERPL-SCNC: 1.6 MMOL/L (ref 0.5–2)
DEPRECATED RDW RBC AUTO: 47.9 FL (ref 37–54)
EGFRCR SERPLBLD CKD-EPI 2021: 54 ML/MIN/1.73
EOSINOPHIL # BLD MANUAL: 0 10*3/MM3 (ref 0–0.4)
EOSINOPHIL NFR BLD MANUAL: 0 % (ref 0.3–6.2)
ERYTHROCYTE [DISTWIDTH] IN BLOOD BY AUTOMATED COUNT: 12.8 % (ref 12.3–15.4)
GLOBULIN UR ELPH-MCNC: 3.5 GM/DL
GLUCOSE SERPL-MCNC: 99 MG/DL (ref 65–99)
HCT VFR BLD AUTO: 43.8 % (ref 34–46.6)
HGB BLD-MCNC: 14.1 G/DL (ref 12–15.9)
INR PPP: 2.27 (ref 0.9–1.1)
LYMPHOCYTES # BLD MANUAL: 13.66 10*3/MM3 (ref 0.7–3.1)
LYMPHOCYTES NFR BLD MANUAL: 2 % (ref 5–12)
MCH RBC QN AUTO: 32.3 PG (ref 26.6–33)
MCHC RBC AUTO-ENTMCNC: 32.2 G/DL (ref 31.5–35.7)
MCV RBC AUTO: 100.5 FL (ref 79–97)
MONOCYTES # BLD: 0.38 10*3/MM3 (ref 0.1–0.9)
NEUTROPHILS # BLD AUTO: 4.75 10*3/MM3 (ref 1.7–7)
NEUTROPHILS NFR BLD MANUAL: 25.3 % (ref 42.7–76)
PLAT MORPH BLD: NORMAL
PLATELET # BLD AUTO: 141 10*3/MM3 (ref 140–450)
PMV BLD AUTO: 9.7 FL (ref 6–12)
POIKILOCYTOSIS BLD QL SMEAR: ABNORMAL
POTASSIUM SERPL-SCNC: 4.1 MMOL/L (ref 3.5–5.2)
PROT SERPL-MCNC: 7.6 G/DL (ref 6–8.5)
PROTHROMBIN TIME: 25.3 SECONDS (ref 11.7–14.2)
RBC # BLD AUTO: 4.36 10*6/MM3 (ref 3.77–5.28)
SMUDGE CELLS BLD QL SMEAR: ABNORMAL
SODIUM SERPL-SCNC: 139 MMOL/L (ref 136–145)
VARIANT LYMPHS NFR BLD MANUAL: 72.7 % (ref 19.6–45.3)
WBC NRBC COR # BLD AUTO: 18.79 10*3/MM3 (ref 3.4–10.8)

## 2024-08-26 PROCEDURE — 94799 UNLISTED PULMONARY SVC/PX: CPT

## 2024-08-26 PROCEDURE — 36415 COLL VENOUS BLD VENIPUNCTURE: CPT | Performed by: PHYSICIAN ASSISTANT

## 2024-08-26 PROCEDURE — 93971 EXTREMITY STUDY: CPT | Performed by: SURGERY

## 2024-08-26 PROCEDURE — 94761 N-INVAS EAR/PLS OXIMETRY MLT: CPT

## 2024-08-26 PROCEDURE — 25010000002 CEFTRIAXONE PER 250 MG: Performed by: PHYSICIAN ASSISTANT

## 2024-08-26 PROCEDURE — G0378 HOSPITAL OBSERVATION PER HR: HCPCS

## 2024-08-26 PROCEDURE — 87040 BLOOD CULTURE FOR BACTERIA: CPT | Performed by: PHYSICIAN ASSISTANT

## 2024-08-26 PROCEDURE — 85025 COMPLETE CBC W/AUTO DIFF WBC: CPT | Performed by: PHYSICIAN ASSISTANT

## 2024-08-26 PROCEDURE — 80053 COMPREHEN METABOLIC PANEL: CPT | Performed by: PHYSICIAN ASSISTANT

## 2024-08-26 PROCEDURE — 85610 PROTHROMBIN TIME: CPT | Performed by: PHYSICIAN ASSISTANT

## 2024-08-26 PROCEDURE — 93971 EXTREMITY STUDY: CPT

## 2024-08-26 PROCEDURE — 83605 ASSAY OF LACTIC ACID: CPT | Performed by: PHYSICIAN ASSISTANT

## 2024-08-26 PROCEDURE — 85007 BL SMEAR W/DIFF WBC COUNT: CPT | Performed by: PHYSICIAN ASSISTANT

## 2024-08-26 PROCEDURE — 99285 EMERGENCY DEPT VISIT HI MDM: CPT

## 2024-08-26 PROCEDURE — 25010000002 CEFEPIME PER 500 MG: Performed by: INTERNAL MEDICINE

## 2024-08-26 RX ORDER — BISACODYL 5 MG/1
5 TABLET, DELAYED RELEASE ORAL DAILY PRN
Status: DISCONTINUED | OUTPATIENT
Start: 2024-08-26 | End: 2024-09-03 | Stop reason: HOSPADM

## 2024-08-26 RX ORDER — VANCOMYCIN/0.9 % SOD CHLORIDE 1.5G/250ML
20 PLASTIC BAG, INJECTION (ML) INTRAVENOUS ONCE
Status: COMPLETED | OUTPATIENT
Start: 2024-08-26 | End: 2024-08-27

## 2024-08-26 RX ORDER — LOPERAMIDE HCL 2 MG
2 CAPSULE ORAL 4 TIMES DAILY PRN
COMMUNITY

## 2024-08-26 RX ORDER — FUROSEMIDE 20 MG
20 TABLET ORAL DAILY
Status: DISCONTINUED | OUTPATIENT
Start: 2024-08-26 | End: 2024-09-03 | Stop reason: HOSPADM

## 2024-08-26 RX ORDER — CETIRIZINE HYDROCHLORIDE 10 MG/1
10 TABLET ORAL NIGHTLY
Status: DISCONTINUED | OUTPATIENT
Start: 2024-08-26 | End: 2024-09-03 | Stop reason: HOSPADM

## 2024-08-26 RX ORDER — ACETAMINOPHEN 650 MG/1
650 SUPPOSITORY RECTAL EVERY 4 HOURS PRN
Status: DISCONTINUED | OUTPATIENT
Start: 2024-08-26 | End: 2024-09-03 | Stop reason: HOSPADM

## 2024-08-26 RX ORDER — WARFARIN SODIUM 2 MG/1
2 TABLET ORAL
Status: DISCONTINUED | OUTPATIENT
Start: 2024-08-27 | End: 2024-09-03 | Stop reason: HOSPADM

## 2024-08-26 RX ORDER — ONDANSETRON 4 MG/1
4 TABLET, ORALLY DISINTEGRATING ORAL EVERY 6 HOURS PRN
Status: DISCONTINUED | OUTPATIENT
Start: 2024-08-26 | End: 2024-09-03 | Stop reason: HOSPADM

## 2024-08-26 RX ORDER — BISACODYL 10 MG
10 SUPPOSITORY, RECTAL RECTAL DAILY PRN
Status: DISCONTINUED | OUTPATIENT
Start: 2024-08-26 | End: 2024-09-03 | Stop reason: HOSPADM

## 2024-08-26 RX ORDER — WARFARIN SODIUM 4 MG/1
4 TABLET ORAL
Status: DISCONTINUED | OUTPATIENT
Start: 2024-08-26 | End: 2024-09-02

## 2024-08-26 RX ORDER — SODIUM CHLORIDE 9 MG/ML
40 INJECTION, SOLUTION INTRAVENOUS AS NEEDED
Status: DISCONTINUED | OUTPATIENT
Start: 2024-08-26 | End: 2024-09-03 | Stop reason: HOSPADM

## 2024-08-26 RX ORDER — ACETAMINOPHEN 160 MG/5ML
650 SOLUTION ORAL EVERY 4 HOURS PRN
Status: DISCONTINUED | OUTPATIENT
Start: 2024-08-26 | End: 2024-09-03 | Stop reason: HOSPADM

## 2024-08-26 RX ORDER — OXYBUTYNIN CHLORIDE 10 MG/1
10 TABLET, EXTENDED RELEASE ORAL 2 TIMES DAILY
Status: DISCONTINUED | OUTPATIENT
Start: 2024-08-26 | End: 2024-09-03 | Stop reason: HOSPADM

## 2024-08-26 RX ORDER — SODIUM CHLORIDE 0.9 % (FLUSH) 0.9 %
10 SYRINGE (ML) INJECTION AS NEEDED
Status: DISCONTINUED | OUTPATIENT
Start: 2024-08-26 | End: 2024-09-03 | Stop reason: HOSPADM

## 2024-08-26 RX ORDER — ROSUVASTATIN CALCIUM 20 MG/1
20 TABLET, COATED ORAL NIGHTLY
Status: DISCONTINUED | OUTPATIENT
Start: 2024-08-26 | End: 2024-09-03 | Stop reason: HOSPADM

## 2024-08-26 RX ORDER — BUDESONIDE 0.5 MG/2ML
0.5 INHALANT ORAL 2 TIMES DAILY
Status: DISCONTINUED | OUTPATIENT
Start: 2024-08-26 | End: 2024-09-03 | Stop reason: HOSPADM

## 2024-08-26 RX ORDER — MONTELUKAST SODIUM 10 MG/1
10 TABLET ORAL NIGHTLY
Status: DISCONTINUED | OUTPATIENT
Start: 2024-08-26 | End: 2024-09-03 | Stop reason: HOSPADM

## 2024-08-26 RX ORDER — CARVEDILOL 3.12 MG/1
3.12 TABLET ORAL 2 TIMES DAILY
Status: DISCONTINUED | OUTPATIENT
Start: 2024-08-26 | End: 2024-08-30

## 2024-08-26 RX ORDER — ALBUTEROL SULFATE 0.83 MG/ML
2.5 SOLUTION RESPIRATORY (INHALATION) EVERY 4 HOURS
Status: DISCONTINUED | OUTPATIENT
Start: 2024-08-26 | End: 2024-09-03 | Stop reason: HOSPADM

## 2024-08-26 RX ORDER — ACETAMINOPHEN 325 MG/1
650 TABLET ORAL EVERY 4 HOURS PRN
Status: DISCONTINUED | OUTPATIENT
Start: 2024-08-26 | End: 2024-09-03 | Stop reason: HOSPADM

## 2024-08-26 RX ORDER — NITROGLYCERIN 0.4 MG/1
0.4 TABLET SUBLINGUAL
Status: DISCONTINUED | OUTPATIENT
Start: 2024-08-26 | End: 2024-09-03 | Stop reason: HOSPADM

## 2024-08-26 RX ORDER — CALCIUM CARBONATE 500 MG/1
2 TABLET, CHEWABLE ORAL 3 TIMES DAILY PRN
Status: DISCONTINUED | OUTPATIENT
Start: 2024-08-26 | End: 2024-09-03 | Stop reason: HOSPADM

## 2024-08-26 RX ORDER — FLUTICASONE PROPIONATE 50 MCG
1 SPRAY, SUSPENSION (ML) NASAL DAILY
Status: DISCONTINUED | OUTPATIENT
Start: 2024-08-26 | End: 2024-09-03 | Stop reason: HOSPADM

## 2024-08-26 RX ORDER — SODIUM CHLORIDE 0.9 % (FLUSH) 0.9 %
10 SYRINGE (ML) INJECTION EVERY 12 HOURS SCHEDULED
Status: DISCONTINUED | OUTPATIENT
Start: 2024-08-26 | End: 2024-09-03 | Stop reason: HOSPADM

## 2024-08-26 RX ORDER — POLYETHYLENE GLYCOL 3350 17 G/17G
17 POWDER, FOR SOLUTION ORAL DAILY PRN
Status: DISCONTINUED | OUTPATIENT
Start: 2024-08-26 | End: 2024-09-03 | Stop reason: HOSPADM

## 2024-08-26 RX ORDER — ONDANSETRON 2 MG/ML
4 INJECTION INTRAMUSCULAR; INTRAVENOUS EVERY 6 HOURS PRN
Status: DISCONTINUED | OUTPATIENT
Start: 2024-08-26 | End: 2024-09-03 | Stop reason: HOSPADM

## 2024-08-26 RX ORDER — CEPHALEXIN 500 MG/1
500 CAPSULE ORAL 3 TIMES DAILY
COMMUNITY
End: 2024-09-03 | Stop reason: HOSPADM

## 2024-08-26 RX ORDER — AMOXICILLIN 250 MG
2 CAPSULE ORAL 2 TIMES DAILY PRN
Status: DISCONTINUED | OUTPATIENT
Start: 2024-08-26 | End: 2024-09-03 | Stop reason: HOSPADM

## 2024-08-26 RX ADMIN — OXYBUTYNIN CHLORIDE 10 MG: 10 TABLET, EXTENDED RELEASE ORAL at 21:06

## 2024-08-26 RX ADMIN — WARFARIN 4 MG: 4 TABLET ORAL at 21:06

## 2024-08-26 RX ADMIN — Medication 10 ML: at 21:17

## 2024-08-26 RX ADMIN — CEFEPIME HYDROCHLORIDE 1000 MG: 1 INJECTION, POWDER, FOR SOLUTION INTRAMUSCULAR; INTRAVENOUS at 18:12

## 2024-08-26 RX ADMIN — CETIRIZINE HYDROCHLORIDE 10 MG: 10 TABLET ORAL at 21:06

## 2024-08-26 RX ADMIN — ALBUTEROL SULFATE 2.5 MG: 2.5 SOLUTION RESPIRATORY (INHALATION) at 20:46

## 2024-08-26 RX ADMIN — BUDESONIDE 0.5 MG: 0.5 INHALANT RESPIRATORY (INHALATION) at 20:50

## 2024-08-26 RX ADMIN — CEFTRIAXONE 2000 MG: 2 INJECTION, POWDER, FOR SOLUTION INTRAMUSCULAR; INTRAVENOUS at 12:16

## 2024-08-26 RX ADMIN — MONTELUKAST SODIUM 10 MG: 10 TABLET, FILM COATED ORAL at 21:06

## 2024-08-26 RX ADMIN — ROSUVASTATIN CALCIUM 20 MG: 20 TABLET, FILM COATED ORAL at 21:06

## 2024-08-26 RX ADMIN — FLUTICASONE PROPIONATE 1 SPRAY: 50 SPRAY, METERED NASAL at 21:07

## 2024-08-26 NOTE — H&P
Patient Name:  Caitlyn Longoria  YOB: 1934  MRN:  [id number]  Admit Date:  8/26/2024  Patient Care Team:  Zenaida Suarez MD as PCP - General (Internal Medicine)  Pao Levi RPH as Pharmacist  Alexander Valiente PharmD as Pharmacist (Pharmacy)  Zenaida Suarez MD as Referring Physician (Internal Medicine)  Lucien aLrkin MD as Consulting Physician (Hematology and Oncology)      Subjective   History Present Illness     Chief Complaint   Patient presents with    Leg Swelling       Ms. Longoria is a 89 y.o. non-smoker with a history of HTN, HLD, Type 2 DM, CAD, PAF on warfarin, CLL, and chronic combined systolic and diastolic CHF as well as recent hospital admission discharged yesterday after having been treated for a URI that presents to [facility] complaining of left lower extremity pain, swelling, and erythema which was of rapid onset and first noticed this morning.     Review of Systems   All other systems reviewed and are negative.       Personal History     Past Medical History:   Diagnosis Date    Aortic calcification     mild, on echo 12/17/2017    Aortic regurgitation     Trace    Asthma     Atrial fibrillation     CAD (coronary artery disease)     Carpal tunnel syndrome of left wrist     Chronic combined systolic and diastolic congestive heart failure     CKD (chronic kidney disease) stage 3, GFR 30-59 ml/min     COPD (chronic obstructive pulmonary disease)     Coronary artery disease involving native coronary artery of native heart with angina pectoris     Disc degeneration, lumbar     Diverticulosis     DM type 2 (diabetes mellitus, type 2)     GERD (gastroesophageal reflux disease)     History of aneurysm     right femoral artery s/p LHC    History of blood transfusion     History of fracture     History of heart attack     History of vitamin D deficiency     Hyperlipidemia     Hypertension     Leukemia     Mild mitral regurgitation     Mitral  annular calcification     12/8/2017- echo, moderate    Osteopenia     PAF (paroxysmal atrial fibrillation)     Peripheral neuropathy     Skin cancer     Left hand    Sleep apnea     bipap    SSS (sick sinus syndrome)     Stroke (cerebrum)     TIA (transient ischemic attack) 2017    Tricuspid regurgitation     Trace     Past Surgical History:   Procedure Laterality Date    BRONCHOSCOPY Bilateral 10/6/2020    Procedure: BRONCHOSCOPY with BILATERAL LUNG washings;  Surgeon: Juno Coburn MD;  Location: [facility] ENDOSCOPY;  Service: Pulmonary;  Laterality: Bilateral;  PRE: purulent bronchitis  POST: PURULENT BRONCHITIS    BRONCHOSCOPY Bilateral 10/9/2020    Procedure: BRONCHOSCOPY with washing;  Surgeon: Juno Coburn MD;  Location: [facility] ENDOSCOPY;  Service: Pulmonary;  Laterality: Bilateral;  pre/post - mucous plug      CARDIAC CATHETERIZATION      CARDIAC ELECTROPHYSIOLOGY PROCEDURE N/A 2/7/2020    Procedure: PPM generator change - dual  medtronic;  Surgeon: Kiel Field MD;  Location: [facility] CATH INVASIVE LOCATION;  Service: Cardiology;  Laterality: N/A;    CHOLECYSTECTOMY      CORONARY STENT PLACEMENT      ENDOSCOPY N/A 9/14/2022    Procedure: ESOPHAGOGASTRODUODENOSCOPY with 54fr main dilatation;  Surgeon: Enio Cota MD;  Location: [facility] ENDOSCOPY;  Service: Gastroenterology;  Laterality: N/A;  pre - dysphagia  post - s/p dilatation, watermelon stomach    HERNIA REPAIR      hital hernia    HYSTERECTOMY      PACEMAKER IMPLANTATION      REPLACEMENT TOTAL KNEE Bilateral      Family History   Problem Relation Age of Onset    Heart disease Mother     Hypertension Mother     Colon polyps Mother     Heart disease Father     Hypertension Father     Stroke Father     Hypertension Brother     Heart disease Brother     Diabetes Niece     Colon cancer Sister     Hypertension Sister     Hypertension Daughter     Hypertension Son     Hypertension Maternal Aunt     Hypertension Maternal Grandmother      Hypertension Maternal Grandfather     Hypertension Paternal Grandmother     Hypertension Paternal Grandfather      Social History     Tobacco Use    Smoking status: Never     Passive exposure: Never    Smokeless tobacco: Never    Tobacco comments:     caffeine use- soda   Vaping Use    Vaping status: Never Used   Substance Use Topics    Alcohol use: Never    Drug use: No     No current facility-administered medications on file prior to encounter.     Current Outpatient Medications on File Prior to Encounter   Medication Sig Dispense Refill    acetaminophen (TYLENOL) 325 MG tablet Take 2 tablets by mouth Every 4 (Four) Hours As Needed for Mild Pain .      Acetylcysteine (NAC PO) Take 1,000 mg by mouth 2 (Two) Times a Day.      albuterol (PROVENTIL) (2.5 MG/3ML) 0.083% nebulizer solution Take 2.5 mg by nebulization Every 4 (Four) Hours.      Benralizumab (FASENRA SC) Inject 1 dose under the skin into the appropriate area as directed Every 2 (Two) Months. Every 8 weeks      budesonide (PULMICORT) 0.5 MG/2ML nebulizer solution Take 2 mL by nebulization 2 (Two) Times a Day.      Calcium Carbonate-Vitamin D (calcium 500 mg vitamin D 5 mcg, 200 UT,) 500-5 MG-MCG tablet per tablet Take 1 tablet by mouth 2 (Two) Times a Day. 60 tablet 3    carvedilol (COREG) 3.125 MG tablet TAKE 1 TABLET TWICE A  tablet 3    cephalexin (KEFLEX) 500 MG capsule Take 1 capsule by mouth 3 (Three) Times a Day.      Cetirizine HCl 10 MG capsule Take 1 capsule by mouth Daily.      fluticasone (FLONASE) 50 MCG/ACT nasal spray 1 spray into the nostril(s) as directed by provider Daily.      furosemide (LASIX) 20 MG tablet Take 1 tablet by mouth As Needed (For weight gain of greater than 2 pounds in 1 day or 5 pounds in 1 week). (Patient taking differently: Take 1 tablet by mouth Daily.) 30 tablet 11    glipizide (GLUCOTROL) 5 MG tablet Take 1 tablet by mouth Daily With Breakfast.      loperamide (IMODIUM) 2 MG capsule Take 1 capsule by mouth 4  (Four) Times a Day As Needed for Diarrhea.      melatonin 5 MG tablet tablet Take 1 tablet by mouth Every Night.      metFORMIN ER (GLUCOPHAGE-XR) 500 MG 24 hr tablet Take 1 tablet by mouth Daily With Breakfast.      montelukast (SINGULAIR) 10 MG tablet Take 1 tablet by mouth Every Night.      oxybutynin XL (DITROPAN-XL) 10 MG 24 hr tablet Take 1 tablet by mouth 2 (Two) Times a Day.      rosuvastatin (CRESTOR) 20 MG tablet Take 1 tablet by mouth Every Night.      warfarin (COUMADIN) 4 MG tablet TAKE ONE-HALF (1/2) TABLET ON SUNDAY AND FRIDAY AND TAKE ONE TABLET ALL OTHER DAYS OR AS DIRECTED (Patient taking differently: Take  by mouth See Admin Instructions. TAKE ONE-HALF (1/2) TABLET (2 MG) ON TUESDAYS AND THURSDAYS AND TAKE ONE TABLET (4 MG) ALL OTHER DAYS OR AS DIRECTED) 80 tablet 1    [DISCONTINUED] albuterol sulfate  (90 Base) MCG/ACT inhaler Inhale 2 puffs Every 4 (Four) Hours As Needed for Wheezing. 1 inhaler 3    [DISCONTINUED] azithromycin (ZITHROMAX) 250 MG tablet Indications: Upper Respiratory Tract Infection 2 tablet 0    [DISCONTINUED] revefenacin (Yupelri) 175 MCG/3ML nebulizer solution Take  by nebulization Daily.       Allergies   Allergen Reactions    Accupril [Quinapril Hcl] Swelling, Other (See Comments), GI Intolerance and Delirium     HA, constipation     Ahist [Chlorpheniramine] Nausea Only, Other (See Comments) and Dizziness     Headache, Blurred vision    Clarithromycin Nausea Only, Other (See Comments) and Mental Status Change     HA, Depression, Flushing    Esomeprazole GI Intolerance    Latex Other (See Comments)     Skin breakdown    Levalbuterol Swelling    Levocetirizine Diarrhea and GI Intolerance    Lipitor [Atorvastatin] Other (See Comments) and Myalgia     Dark urine    Omeprazole Nausea Only and Other (See Comments)     HA    Pravachol [Pravastatin] Nausea Only and GI Intolerance     Bloated, Constipation, HA    Sulindac Other (See Comments) and Myalgia     HA, joint pain,  bruising    Valdecoxib Irritability    Chlorcyclizine Unknown - Low Severity    Diclofenac Sodium Unknown - Low Severity    Diphenhydramine Unknown - Low Severity    Lodine [Etodolac] Unknown - Low Severity    Sulfa Antibiotics Unknown - Low Severity       Objective    Objective     Vital Signs  Temp:  [96.9 °F (36.1 °C)-98.2 °F (36.8 °C)] 98.2 °F (36.8 °C)  Heart Rate:  [64-80] 80  Resp:  [16-22] 22  BP: (114-138)/(61-87) 116/66  SpO2:  [95 %-98 %] 95 %  on   ;   Device (Oxygen Therapy): room air  Body mass index is 31.42 kg/m².    Physical Exam  Vitals and nursing note reviewed.   Constitutional:       General: She is not in acute distress.     Appearance: She is ill-appearing. She is not toxic-appearing.   HENT:      Head: Normocephalic and atraumatic.      Nose: Nose normal.      Mouth/Throat:      Mouth: Mucous membranes are moist.      Pharynx: Oropharynx is clear.   Eyes:      Conjunctiva/sclera: Conjunctivae normal.      Pupils: Pupils are equal, round, and reactive to light.   Cardiovascular:      Rate and Rhythm: Normal rate and regular rhythm.      Pulses: Normal pulses.   Pulmonary:      Effort: Pulmonary effort is normal.      Breath sounds: Normal breath sounds. No wheezing, rhonchi or rales.   Abdominal:      General: Bowel sounds are normal. There is no distension.      Palpations: Abdomen is soft.      Tenderness: There is no abdominal tenderness.   Musculoskeletal:         General: Swelling (trace RLE, 1-2+ LLE) present. No tenderness.   Skin:     General: Skin is warm and dry.      Capillary Refill: Capillary refill takes less than 2 seconds.      Findings: Erythema (RLE) present.   Neurological:      General: No focal deficit present.      Mental Status: She is alert and oriented to person, place, and time.   Psychiatric:         Mood and Affect: Mood normal.         Behavior: Behavior normal.         Results Review:  I reviewed the patient's new clinical results.  I reviewed the patient's new  imaging results and agree with the interpretation.  I reviewed the patient's other test results and agree with the interpretation  I personally viewed and interpreted the patient's EKG/Telemetry data  Discussed with ED provider.    Lab Results (last 24 hours)       Procedure Component Value Units Date/Time    CBC & Differential [[id number]]  (Abnormal) Collected: 08/26/24 1139    Specimen: Blood Updated: 08/26/24 1157    Narrative:      The following orders were created for panel order CBC & Differential.  Procedure                               Abnormality         Status                     ---------                               -----------         ------                     CBC Auto Differential[[id number]]        Abnormal            Final result                 Please view results for these tests on the individual orders.    Comprehensive Metabolic Panel [[id number]]  (Abnormal) Collected: 08/26/24 1139    Specimen: Blood Updated: 08/26/24 1214     Glucose 99 mg/dL      BUN 19 mg/dL      Creatinine 1.00 mg/dL      Sodium 139 mmol/L      Potassium 4.1 mmol/L      Chloride 98 mmol/L      CO2 27.4 mmol/L      Calcium 10.3 mg/dL      Total Protein 7.6 g/dL      Albumin 4.1 g/dL      ALT (SGPT) 175 U/L      AST (SGOT) 217 U/L      Alkaline Phosphatase 218 U/L      Total Bilirubin 2.5 mg/dL      Globulin 3.5 gm/dL      A/G Ratio 1.2 g/dL      BUN/Creatinine Ratio 19.0     Anion Gap 13.6 mmol/L      eGFR 54.0 mL/min/1.73     Narrative:      GFR Normal >60  Chronic Kidney Disease <60  Kidney Failure <15    The GFR formula is only valid for adults with stable renal function between ages 18 and 70.    Lactic Acid, Plasma [[id number]]  (Normal) Collected: 08/26/24 1139    Specimen: Blood Updated: 08/26/24 1208     Lactate 1.6 mmol/L     Protime-INR [[id number]]  (Abnormal) Collected: 08/26/24 1139    Specimen: Blood Updated: 08/26/24 1202     Protime 25.3 Seconds      INR 2.27    CBC Auto Differential [[id number]]  (Abnormal)  Collected: 08/26/24 1139    Specimen: Blood Updated: 08/26/24 1157     WBC 18.79 10*3/mm3      RBC 4.36 10*6/mm3      Hemoglobin 14.1 g/dL      Hematocrit 43.8 %      .5 fL      MCH 32.3 pg      MCHC 32.2 g/dL      RDW 12.8 %      RDW-SD 47.9 fl      MPV 9.7 fL      Platelets 141 10*3/mm3     Manual Differential [[id number]]  (Abnormal) Collected: 08/26/24 1139    Specimen: Blood Updated: 08/26/24 1225     Neutrophil % 25.3 %      Lymphocyte % 72.7 %      Monocyte % 2.0 %      Eosinophil % 0.0 %      Basophil % 0.0 %      Neutrophils Absolute 4.75 10*3/mm3      Lymphocytes Absolute 13.66 10*3/mm3      Monocytes Absolute 0.38 10*3/mm3      Eosinophils Absolute 0.00 10*3/mm3      Basophils Absolute 0.00 10*3/mm3      Poikilocytes Mod/2+     Smudge Cells Slight/1+     Platelet Morphology Normal    Blood Culture - Blood, Arm, Right [[id number]] Collected: 08/26/24 1144    Specimen: Blood from Arm, Right Updated: 08/26/24 1153    Blood Culture - Blood, Arm, Right [[id number]] Collected: 08/26/24 1205    Specimen: Blood from Arm, Right Updated: 08/26/24 1208            Imaging Results (Last 24 Hours)       ** No results found for the last 24 hours. **            Results for orders placed during the hospital encounter of 08/22/24    Adult Transthoracic Echo Complete W/ Cont if Necessary Per Protocol    Interpretation Summary    Left ventricular wall thickness is consistent with hypertrophy. Sigmoid-shaped ventricular septum is present.    Septal wall motion is abnormal, consistent with right ventricular pacing.    Left ventricular ejection fraction appears to be 41 - 45%.    Left ventricular diastolic function is consistent with (grade II w/high LAP) pseudonormalization    Normal right ventricular cavity size and systolic function noted    The left atrial cavity is moderately dilated.    The aortic valve leaflets are moderately calcified (aortic sclerosis)    Calculated right ventricular systolic pressure from  tricuspid regurgitation is 20 mmHg.    There is no evidence of pericardial effusion      No orders to display        Assessment/Plan     Active Hospital Problems    Diagnosis  POA    **Cellulitis of left lower leg [L03.116]  Yes    COPD (chronic obstructive pulmonary disease) [J44.9]  Yes    Anticoagulated on Coumadin [Z79.01]  Not Applicable    CLL (chronic lymphocytic leukemia) [C91.10]  Yes    Morbid obesity [E66.01]  Yes    Chronic combined systolic (congestive) and diastolic (congestive) heart failure [I50.42]  Yes    HTN (hypertension) [I10]  Yes    Coronary artery disease [I25.10]  Yes    HLD (hyperlipidemia) [E78.5]  Yes    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   LLE Cellulitis  - in a diabetic patient with hypogammaglobulinemia  - will cover with IV vancomycin and cefepime for now  - check blood cultures  - venous doppler showed no DVT    HTN/CAD/PAF/Chronic Combined Systolic and Diastolic CHF  - BP, HR, and volume states acceptable, no anginal symptoms  - resume coreg and lasix  - on AC with warfarin, will ask pharmacy to dose    Type 2 DM  - hold metformin and glipizide  - cover with ssi/hypoglycemia protocol    COPD  - no exacerbation  - resume home regimen    I discussed the patient's findings and my recommendations with patient, family, nursing staff, and ED provider.    VTE Prophylaxis - Warfarin (home med).  Code Status - Full code.       Sergey Mondragon MD  [facility]  08/26/24  17:29 EDT

## 2024-08-26 NOTE — PROGRESS NOTES
[facility] Clinical Pharmacy Services: Warfarin Dosing/Monitoring Consult    Caitlyn Longoria is a 89 y.o. female, estimated creatinine clearance is 38.3 mL/min (by C-G formula based on SCr of 1 mg/dL). weighing 80.5 kg (177 lb 6.4 oz).    Results from last 7 days   Lab Units 08/26/24  1139 08/25/24  0655 08/24/24  0537 08/23/24  0614 08/22/24  1146   INR  2.27* 1.98* 1.94* 2.02* 2.68*   HEMOGLOBIN g/dL 14.1 11.5* 11.2* 12.1 11.8*   HEMATOCRIT % 43.8 36.9 35.3 38.0 35.8   PLATELETS 10*3/mm3 141 115* 110* 114* 100*     Prior to admission anticoagulation: warfarin 2 mg Tue, Thurs; 4 mg all other days    Hospital Anticoagulation:  Consulting provider: Giancarlo  Start date: [facility]  Indication: A Fib - requiring full anticoagulation  Target INR: 2 - 3  Expected duration: tbd   Bridge Therapy: No      Potential food or drug interactions: none at this time    Education complete?/Date: N/A; home medication    Assessment/Plan:  Dose: INR is therapeutic - will resume home regimen starting with 4 mg tonight  Monitor for any signs or symptoms of bleeding  Follow up daily INRs and dose adjustments    Pharmacy will continue to follow until discharge or discontinuation of warfarin.     Bella Wright [facility]  Clinical Pharmacist

## 2024-08-26 NOTE — PLAN OF CARE
Goal Outcome Evaluation:     Patient new to 6E from ED. Left lower extremity red and hot to the touch. Admission data base complete. Plan of care ongoing.

## 2024-08-26 NOTE — ED PROVIDER NOTES
Pt presents to the ED c/o acute left lower extremity pain and swelling and redness.  Was just discharged from the hospital yesterday and her legs looked okay, rapidly increasing redness and swelling since then.  No fevers or chills.  Was referred from dermatology today.     On exam,   General: No acute distress, nontoxic  HEENT: Mucous membranes moist, atraumatic, EOMI  Neck: Full ROM  Pulm: Symmetric chest rise, nonlabored, lungs CTAB  Cardiovascular: Regular rate and rhythm, intact distal pulses, significant edema and erythema to the left lower extremity  GI: Soft, nontender, nondistended, no rebound, no guarding, bowel sounds present  MSK: Full ROM, no deformity  Skin: Warm, dry  Neuro: Awake, alert, oriented x 4, GCS 15, moving all extremities, no focal deficits  Psych: Calm, cooperative          Plan: Initial concern for cellulitis, DVT, severe contusion given the fact that she is on anticoagulation, heart failure, renal failure, lecture abnormalities, among others.  Plan for labs, ultrasound, and reevaluate with results.    ED Course as of 08/26/24 1508   Mon Aug 26, 2024   1212 INR(!): 2.27 [KA]   1212 WBC(!): 18.79 [KA]   1212 RBC: 4.36 [KA]   1212 Hemoglobin: 14.1 [KA]   1342 I discussed the patient with vascular technician, left lower extremity Doppler negative for DVT [KA]   1459 Discussed patient with Dr. Mondragon, hospitalist including history presentation workup and he agrees to admit [KA]   1500 ALT (SGPT)(!): 175 [KA]   1500 AST (SGOT)(!): 217 [KA]   1500 Alkaline Phosphatase(!): 218 [KA]   1500 Total Bilirubin(!): 2.5 [KA]   1501 LFTs incidentally newly elevated.  Patient has no abdominal pain or tenderness on exam [KA]      ED Course User Index  [KA] Raine Mascorro PA-C       Findings today concerning for leukocytosis probably had developing cellulitis.  Will plan for hospitalization for IV antibiotics and further monitoring.  All questions or concerns addressed.  Negative for DVT.     MD  Attestation Note    SHARED VISIT: This visit was performed by BOTH a physician and an APC. The substantive portion of the medical decision making was performed by this attesting physician who made or approved the management plan and takes responsibility for patient management. All studies in the APC note (if performed) were independently interpreted by me.                   Bean Leary MD  08/26/24 9888

## 2024-08-26 NOTE — ED PROVIDER NOTES
EMERGENCY DEPARTMENT ENCOUNTER  Room Number:  04/04  PCP: Zenaida Suarez MD  Independent Historians: Patient      HPI:  Chief Complaint: had concerns including Leg Swelling.     A complete HPI/ROS/PMH/PSH/SH/FH are unobtainable due to: None    Chronic or social conditions impacting patient care (Social Determinants of Health): None      Context: The patient is a 89 y.o. female with a medical history of CHF, type 2 diabetes, paroxysmal A-fib on warfarin anticoagulation, CKD who presents to the ED c/o acute pain redness and swelling to the left lower extremity.  She states she was discharged from the hospital yesterday, both of her legs looked normal at that time.  Yesterday evening around 6 PM she noticed she was starting to get some redness of the left leg.  This is significantly worsened since.  She had a dermatologist appointment today and after inspecting it they deferred her to the ER for evaluation and treatment.  She has not had any fevers nausea or vomiting.      Review of prior external notes (non-ED) -and- Review of prior external test results outside of this encounter:  Patient made 8/22/2024 to 8/25/2024 with cough chills and weakness Shortness of breath cough chills and weakness, was admitted for acute CHF and COPD        PAST MEDICAL HISTORY  Active Ambulatory Problems     Diagnosis Date Noted    Neck and shoulder pain 03/24/2017    Arthropathy of shoulder region 03/24/2017    Carpal tunnel syndrome of left wrist 03/24/2017    Chronic pain of both shoulders 06/27/2017    Chronic left shoulder pain 12/05/2017    Arthropathy of left shoulder 12/05/2017    Dysarthria 12/07/2017    Type 2 diabetes mellitus with atherosclerosis of aorta 12/07/2017    Paroxysmal atrial fibrillation 12/07/2017    HLD (hyperlipidemia) 12/07/2017    Chronic bronchitis 12/07/2017    Coronary artery disease 12/07/2017    CVA (cerebral vascular accident) 12/10/2017    HTN (hypertension) 01/14/2018    CKD (chronic  kidney disease), stage III 01/14/2018    Chronic combined systolic (congestive) and diastolic (congestive) heart failure 01/15/2018    Infection of prosthetic right knee joint 01/17/2018    Stasis dermatitis of right lower extremity due to peripheral venous hypertension 04/28/2018    Current use of long term anticoagulation 04/28/2018    Morbid obesity 02/08/2019    Acute respiratory failure with hypoxia 09/16/2019    Rhinovirus 02/11/2020    Asthma with acute exacerbation 02/11/2020    Acute respiratory failure with hypoxemia 02/12/2020    Viral pneumonia 04/23/2020    Hypoxia 08/29/2020    CLL (chronic lymphocytic leukemia) 08/31/2020    Dyspnea 09/29/2020    Anticoagulated on Coumadin     Osteoporotic compression fracture of spine 09/30/2020    Pneumonia due to gram-negative bacteria 10/02/2020    Pneumonia due to infectious organism 09/29/2020    Bilateral carotid artery disease 10/28/2020    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    Cellulitis of right lower extremity 04/14/2024    Hypokalemia 04/15/2024    Nocturnal hypoxia 04/15/2024    COPD (chronic obstructive pulmonary disease) 04/15/2024    Upper respiratory tract infection 08/25/2024    COPD (chronic obstructive pulmonary disease) 08/25/2024    Sick sinus syndrome 08/25/2024    Anemia 08/25/2024    Thrombocytopenia 08/25/2024    Chronic HFrEF (heart failure with reduced ejection fraction) 08/25/2024     Resolved Ambulatory Problems     Diagnosis Date Noted    Hypernatremia 01/15/2018    Shortness of breath 04/28/2020    Shortness of breath 08/22/2024    Hypokalemia 08/25/2024    Hypomagnesemia 08/25/2024    Hypocalcemia 08/25/2024     Past Medical History:   Diagnosis Date    Aortic calcification     Aortic regurgitation     Asthma     Atrial fibrillation     CAD (coronary artery disease)     Chronic combined systolic and diastolic congestive heart failure     CKD (chronic kidney disease) stage 3, GFR 30-59 ml/min      Coronary artery disease involving native coronary artery of native heart with angina pectoris     Disc degeneration, lumbar     Diverticulosis     DM type 2 (diabetes mellitus, type 2)     GERD (gastroesophageal reflux disease)     History of aneurysm     History of blood transfusion     History of fracture     History of heart attack     History of vitamin D deficiency     Hyperlipidemia     Hypertension     Leukemia     Mild mitral regurgitation     Mitral annular calcification     Osteopenia     PAF (paroxysmal atrial fibrillation)     Peripheral neuropathy     Skin cancer     Sleep apnea     SSS (sick sinus syndrome)     Stroke (cerebrum)     TIA (transient ischemic attack) 2017    Tricuspid regurgitation          PAST SURGICAL HISTORY  Past Surgical History:   Procedure Laterality Date    BRONCHOSCOPY Bilateral 10/6/2020    Procedure: BRONCHOSCOPY with BILATERAL LUNG washings;  Surgeon: Juno Coburn MD;  Location: [facility] ENDOSCOPY;  Service: Pulmonary;  Laterality: Bilateral;  PRE: purulent bronchitis  POST: PURULENT BRONCHITIS    BRONCHOSCOPY Bilateral 10/9/2020    Procedure: BRONCHOSCOPY with washing;  Surgeon: Juno Coburn MD;  Location: [facility] ENDOSCOPY;  Service: Pulmonary;  Laterality: Bilateral;  pre/post - mucous plug      CARDIAC CATHETERIZATION      CARDIAC ELECTROPHYSIOLOGY PROCEDURE N/A 2/7/2020    Procedure: PPM generator change - dual  medtronic;  Surgeon: Kiel Field MD;  Location: [facility] CATH INVASIVE LOCATION;  Service: Cardiology;  Laterality: N/A;    CHOLECYSTECTOMY      CORONARY STENT PLACEMENT      ENDOSCOPY N/A 9/14/2022    Procedure: ESOPHAGOGASTRODUODENOSCOPY with 54fr main dilatation;  Surgeon: Enio Cota MD;  Location: [facility] ENDOSCOPY;  Service: Gastroenterology;  Laterality: N/A;  pre - dysphagia  post - s/p dilatation, watermelon stomach    HERNIA REPAIR      hital hernia    HYSTERECTOMY      PACEMAKER IMPLANTATION      REPLACEMENT TOTAL KNEE Bilateral           FAMILY HISTORY  Family History   Problem Relation Age of Onset    Heart disease Mother     Hypertension Mother     Colon polyps Mother     Heart disease Father     Hypertension Father     Stroke Father     Hypertension Brother     Heart disease Brother     Diabetes Niece     Colon cancer Sister     Hypertension Sister     Hypertension Daughter     Hypertension Son     Hypertension Maternal Aunt     Hypertension Maternal Grandmother     Hypertension Maternal Grandfather     Hypertension Paternal Grandmother     Hypertension Paternal Grandfather          SOCIAL HISTORY  Social History     Socioeconomic History    Marital status: Single   Tobacco Use    Smoking status: Never     Passive exposure: Never    Smokeless tobacco: Never    Tobacco comments:     caffeine use- soda   Vaping Use    Vaping status: Never Used   Substance and Sexual Activity    Alcohol use: Never    Drug use: No    Sexual activity: Defer         ALLERGIES  Accupril [quinapril hcl], Ahist [chlorpheniramine], Clarithromycin, Esomeprazole, Latex, Levalbuterol, Levocetirizine, Lipitor [atorvastatin], Omeprazole, Pravachol [pravastatin], Sulindac, Valdecoxib, Chlorcyclizine, Diclofenac sodium, Diphenhydramine, Lodine [etodolac], and Sulfa antibiotics      REVIEW OF SYSTEMS  Review of Systems  Included in HPI  All systems reviewed and negative except for those discussed in HPI.      PHYSICAL EXAM    I have reviewed the triage vital signs and nursing notes.    ED Triage Vitals [08/26/24 1052]   Temp Heart Rate Resp BP SpO2   96.9 °F (36.1 °C) 64 16 115/72 98 %      Temp src Heart Rate Source Patient Position BP Location FiO2 (%)   Tympanic Monitor -- -- --       Physical Exam  GENERAL: alert, no acute distress, chronically ill-appearing  SKIN: Warm, dry.  Left lower leg with 2+ pitting edema, right lower leg with no edema.  The left lower leg also is diffusely erythematous warm and tender.  She does have some small minor areas of eschar.  HENT:  Normocephalic, atraumatic  EYES: no scleral icterus  CV: regular rhythm, irregularly irregular rate  RESPIRATORY: normal effort, lungs clear  ABDOMEN: nondistended soft nontender  MUSCULOSKELETAL: no deformity  NEURO: alert, moves all extremities, follows commands            LAB RESULTS  Recent Results (from the past 24 hour(s))   Comprehensive Metabolic Panel    Collection Time: 08/26/24 11:39 AM    Specimen: Blood   Result Value Ref Range    Glucose 99 65 - 99 mg/dL    BUN 19 8 - 23 mg/dL    Creatinine 1.00 0.57 - 1.00 mg/dL    Sodium 139 136 - 145 mmol/L    Potassium 4.1 3.5 - 5.2 mmol/L    Chloride 98 98 - 107 mmol/L    CO2 27.4 22.0 - 29.0 mmol/L    Calcium 10.3 8.6 - 10.5 mg/dL    Total Protein 7.6 6.0 - 8.5 g/dL    Albumin 4.1 3.5 - 5.2 g/dL    ALT (SGPT) 175 (H) 1 - 33 U/L    AST (SGOT) 217 (H) 1 - 32 U/L    Alkaline Phosphatase 218 (H) 39 - 117 U/L    Total Bilirubin 2.5 (H) 0.0 - 1.2 mg/dL    Globulin 3.5 gm/dL    A/G Ratio 1.2 g/dL    BUN/Creatinine Ratio 19.0 7.0 - 25.0    Anion Gap 13.6 5.0 - 15.0 mmol/L    eGFR 54.0 (L) >60.0 mL/min/1.73   Lactic Acid, Plasma    Collection Time: 08/26/24 11:39 AM    Specimen: Blood   Result Value Ref Range    Lactate 1.6 0.5 - 2.0 mmol/L   Protime-INR    Collection Time: 08/26/24 11:39 AM    Specimen: Blood   Result Value Ref Range    Protime 25.3 (H) 11.7 - 14.2 Seconds    INR 2.27 (H) 0.90 - 1.10   CBC Auto Differential    Collection Time: 08/26/24 11:39 AM    Specimen: Blood   Result Value Ref Range    WBC 18.79 (H) 3.40 - 10.80 10*3/mm3    RBC 4.36 3.77 - 5.28 10*6/mm3    Hemoglobin 14.1 12.0 - 15.9 g/dL    Hematocrit 43.8 34.0 - 46.6 %    .5 (H) 79.0 - 97.0 fL    MCH 32.3 26.6 - 33.0 pg    MCHC 32.2 31.5 - 35.7 g/dL    RDW 12.8 12.3 - 15.4 %    RDW-SD 47.9 37.0 - 54.0 fl    MPV 9.7 6.0 - 12.0 fL    Platelets 141 140 - 450 10*3/mm3   Manual Differential    Collection Time: 08/26/24 11:39 AM    Specimen: Blood   Result Value Ref Range    Neutrophil % 25.3 (L)  42.7 - 76.0 %    Lymphocyte % 72.7 (H) 19.6 - 45.3 %    Monocyte % 2.0 (L) 5.0 - 12.0 %    Eosinophil % 0.0 (L) 0.3 - 6.2 %    Basophil % 0.0 0.0 - 1.5 %    Neutrophils Absolute 4.75 1.70 - 7.00 10*3/mm3    Lymphocytes Absolute 13.66 (H) 0.70 - 3.10 10*3/mm3    Monocytes Absolute 0.38 0.10 - 0.90 10*3/mm3    Eosinophils Absolute 0.00 0.00 - 0.40 10*3/mm3    Basophils Absolute 0.00 0.00 - 0.20 10*3/mm3    Poikilocytes Mod/2+ None Seen    Smudge Cells Slight/1+ None Seen    Platelet Morphology Normal Normal   Duplex Venous Lower Extremity - Left    Collection Time: 08/26/24  1:41 PM   Result Value Ref Range    Right Common Femoral Spont Y     Right Common Femoral Competent Y     Right Common Femoral Phasic Y     Right Common Femoral Compress C     Right Common Femoral Augment Y     Left Common Femoral Spont Y     Left Common Femoral Competent Y     Left Common Femoral Phasic Y     Left Common Femoral Compress C     Left Common Femoral Augment Y     Left Saphenofemoral Junction Compress C     Left Profunda Femoral Compress C     Left Proximal Femoral Compress C     Left Mid Femoral Spont Y     Left Mid Femoral Competent Y     Left Mid Femoral Phasic Y     Left Mid Femoral Compress C     Left Mid Femoral Augment Y     Left Distal Femoral Compress C     Left Popliteal Spont Y     Left Popliteal Competent Y     Left Popliteal Phasic Y     Left Popliteal Compress C     Left Popliteal Augment Y     Left Posterior Tibial Compress C     Left Gastronemius Compress C     Left Greater Saph AK Compress C     Left Greater Saph BK Compress C     Left Lesser Saph Compress C     BH CV VAS PRELIMINARY FINDINGS SCRIPTING 1.0          RADIOLOGY  Duplex Venous Lower Extremity - Left    Result Date: 8/26/2024    Normal left lower extremity venous duplex scan.        MEDICATIONS GIVEN IN ER  Medications   cefTRIAXone (ROCEPHIN) 2,000 mg in sodium chloride 0.9 % 100 mL MBP (0 mg Intravenous Stopped 8/26/24 1521)         ORDERS PLACED DURING  THIS VISIT:  Orders Placed This Encounter   Procedures    Blood Culture - Blood,    Blood Culture - Blood,    Comprehensive Metabolic Panel    Lactic Acid, Plasma    Protime-INR    CBC Auto Differential    Manual Differential    [facility] (on-call MD unless specified) Details    Initiate Observation Status    CBC & Differential         OUTPATIENT MEDICATION MANAGEMENT:  No current Epic-ordered facility-administered medications on file.     Current Outpatient Medications Ordered in Epic   Medication Sig Dispense Refill    acetaminophen (TYLENOL) 325 MG tablet Take 2 tablets by mouth Every 4 (Four) Hours As Needed for Mild Pain .      Acetylcysteine (NAC PO) Take 1,000 mg by mouth 2 (Two) Times a Day.      albuterol (PROVENTIL) (2.5 MG/3ML) 0.083% nebulizer solution Take 2.5 mg by nebulization Every 4 (Four) Hours.      Benralizumab (FASENRA SC) Inject 1 dose under the skin into the appropriate area as directed Every 2 (Two) Months. Every 8 weeks      budesonide (PULMICORT) 0.5 MG/2ML nebulizer solution Take 2 mL by nebulization 2 (Two) Times a Day.      Calcium Carbonate-Vitamin D (calcium 500 mg vitamin D 5 mcg, 200 UT,) 500-5 MG-MCG tablet per tablet Take 1 tablet by mouth 2 (Two) Times a Day. 60 tablet 3    carvedilol (COREG) 3.125 MG tablet TAKE 1 TABLET TWICE A  tablet 3    cephalexin (KEFLEX) 500 MG capsule Take 1 capsule by mouth 3 (Three) Times a Day.      Cetirizine HCl 10 MG capsule Take 1 capsule by mouth Daily.      fluticasone (FLONASE) 50 MCG/ACT nasal spray 1 spray into the nostril(s) as directed by provider Daily.      furosemide (LASIX) 20 MG tablet Take 1 tablet by mouth As Needed (For weight gain of greater than 2 pounds in 1 day or 5 pounds in 1 week). (Patient taking differently: Take 1 tablet by mouth Daily.) 30 tablet 11    glipizide (GLUCOTROL) 5 MG tablet Take 1 tablet by mouth Daily With Breakfast.      loperamide (IMODIUM) 2 MG capsule Take 1 capsule by mouth 4 (Four) Times a Day As Needed  for Diarrhea.      melatonin 5 MG tablet tablet Take 1 tablet by mouth Every Night.      metFORMIN ER (GLUCOPHAGE-XR) 500 MG 24 hr tablet Take 1 tablet by mouth Daily With Breakfast.      montelukast (SINGULAIR) 10 MG tablet Take 1 tablet by mouth Every Night.      oxybutynin XL (DITROPAN-XL) 10 MG 24 hr tablet Take 1 tablet by mouth 2 (Two) Times a Day.      rosuvastatin (CRESTOR) 20 MG tablet Take 1 tablet by mouth Every Night.      warfarin (COUMADIN) 4 MG tablet TAKE ONE-HALF (1/2) TABLET ON SUNDAY AND FRIDAY AND TAKE ONE TABLET ALL OTHER DAYS OR AS DIRECTED (Patient taking differently: Take  by mouth See Admin Instructions. TAKE ONE-HALF (1/2) TABLET ON SUNDAY AND FRIDAY AND TAKE ONE TABLET ALL OTHER DAYS OR AS DIRECTED) 80 tablet 1         PROCEDURES  Procedures            PROGRESS, DATA ANALYSIS, CONSULTS, AND MEDICAL DECISION MAKING  All labs have been independently interpreted by me.  All radiology studies have been reviewed by me. All EKG's have been independently viewed and interpreted by me.  Discussion below represents my analysis of pertinent findings related to patient's condition, differential diagnosis, treatment plan and final disposition.    DIFFERENTIAL    My differential diagnosis includes but is not limited to cellulitis, DVT, acute CHF, peripheral edema    Clinical Scores:                  ED Course as of 08/26/24 1523   Mon Aug 26, 2024   1212 INR(!): 2.27 [KA]   1212 WBC(!): 18.79 [KA]   1212 RBC: 4.36 [KA]   1212 Hemoglobin: 14.1 [KA]   1342 I discussed the patient with vascular technician, left lower extremity Doppler negative for DVT [KA]   1459 Discussed patient with Dr. Mondragon, hospitalist including history presentation workup and he agrees to admit [KA]   1500 ALT (SGPT)(!): 175 [KA]   1500 AST (SGOT)(!): 217 [KA]   1500 Alkaline Phosphatase(!): 218 [KA]   1500 Total Bilirubin(!): 2.5 [KA]   1501 LFTs incidentally newly elevated.  Patient has no abdominal pain or tenderness on exam  [KA]      ED Course User Index  [KA] Raine Mascorro PA-C       Counseled the patient about all of her lab and imaging results recommendation for admission and she is agreeable.      AS OF 15:23 EDT VITALS:    BP - 115/64  HR - 79  TEMP - 96.9 °F (36.1 °C) (Tympanic)  O2 SATS - 96%    COMPLEXITY OF CARE  The patient requires admission.      DIAGNOSIS  Final diagnoses:   Cellulitis of left leg   Chronic anticoagulation         DISPOSITION  ED Disposition       ED Disposition   Decision to Admit    Condition   --    Comment   Level of Care: Telemetry [5]   Diagnosis: Cellulitis of left lower leg [656324]   Admitting Physician: BUBBA BUSBY [[id number]]   Attending Physician: BUBBA BUSBY [[id number]]                    FOLLOW UP  No follow-up provider specified.      Prescribed Medications     Medication List        ASK your doctor about these medications      albuterol (2.5 MG/3ML) 0.083% nebulizer solution  Commonly known as: PROVENTIL  Ask about: Which instructions should I use?                        Please note that portions of this document were completed with a voice recognition program.    Note Disclaimer: At [facility], we believe that sharing information builds trust and better relationships. You are receiving this note because you recently visited [facility]. It is possible you will see health information before a provider has talked with you about it. This kind of information can be easy to misunderstand. To help you fully understand what it means for your health, we urge you to discuss this note with your provider.         Raine Mascorro PA-C  08/26/24 1529

## 2024-08-26 NOTE — OUTREACH NOTE
Prep Survey      Flowsheet Row Responses   [facility] facility patient discharged from? [facility]   Is LACE score < 7 ? No   Eligibility Readm Mgmt   Discharge diagnosis Upper respiratory tract infection   Does the patient have one of the following disease processes/diagnoses(primary or secondary)? Other   Does the patient have Home health ordered? No   Is there a DME ordered? No   Prep survey completed? Yes            Oksana WOLFE - Registered Nurse

## 2024-08-26 NOTE — PROGRESS NOTES
"Enter Query Response Below      Query Response: Type II NSTEMI ruled in             If applicable, please update the problem list.     Patient: Caitlyn Longoria        : 1934  Account: [id number]           Admit Date: 2024        How to Respond to this query:       a. Click New Note     b. Answer query within the yellow box.                c. Update the Problem List, if applicable.      If you have any questions about this query contact me at: [email or web address]     Dr. Kline:    89-year-old patient admitted  with a viral upper respiratory infection.  Medical history of COPD, Chronic combined CHF, CLL, CAD, and MI.   HS troponin T levels 71, 56 on day of admission, proBNP 4294.  Cardiology  note- \"Type II NSTEMI secondary to respiratory viral infection and coronary artery disease.\"  Note also indicates patient had no anginal symptoms and no indication for an ischemic evaluation during the stay.  Meds including Coreg, Crestor, Warfarin continued. NSTEMI is not further documented in the record.    Please clarify the following:    Type II NSTEMI ruled in  Type II NSTEMI ruled out  Other- specify______  Unable to determine      By submitting this query, we are merely seeking further clarification of documentation to accurately reflect all conditions that you are monitoring, evaluating, treating or that extend the hospitalization or utilize additional resources of care. Please utilize your independent clinical judgment when addressing the question(s) above.     This query and your response, once completed, will be entered into the legal medical record.    Sincerely,  Renetta Freeman RN, [facility]  Clinical Documentation Integrity Program     "

## 2024-08-26 NOTE — PROGRESS NOTES
Clinical Pharmacy Services: Medication History    Caitlyn Longoria is a 89 y.o. female presenting to [facility] for   Chief Complaint   Patient presents with    Leg Swelling       She  has a past medical history of Aortic calcification, Aortic regurgitation, Asthma, Atrial fibrillation, CAD (coronary artery disease), Carpal tunnel syndrome of left wrist, Chronic combined systolic and diastolic congestive heart failure, CKD (chronic kidney disease) stage 3, GFR 30-59 ml/min, COPD (chronic obstructive pulmonary disease), Coronary artery disease involving native coronary artery of native heart with angina pectoris, Disc degeneration, lumbar, Diverticulosis, DM type 2 (diabetes mellitus, type 2), GERD (gastroesophageal reflux disease), History of aneurysm, History of blood transfusion, History of fracture, History of heart attack, History of vitamin D deficiency, Hyperlipidemia, Hypertension, Leukemia, Mild mitral regurgitation, Mitral annular calcification, Osteopenia, PAF (paroxysmal atrial fibrillation), Peripheral neuropathy, Skin cancer, Sleep apnea, SSS (sick sinus syndrome), Stroke (cerebrum), TIA (transient ischemic attack) (2017), and Tricuspid regurgitation.    Allergies as of 08/26/2024 - Reviewed 08/26/2024   Allergen Reaction Noted    Accupril [quinapril hcl] Swelling, Other (See Comments), GI Intolerance, and Delirium 03/24/2017    Ahist [chlorpheniramine] Nausea Only, Other (See Comments), and Dizziness 03/24/2017    Clarithromycin Nausea Only, Other (See Comments), and Mental Status Change 04/20/2016    Esomeprazole GI Intolerance 04/20/2016    Latex Other (See Comments) 01/24/2023    Levalbuterol Swelling 04/20/2016    Levocetirizine Diarrhea and GI Intolerance 04/20/2016    Lipitor [atorvastatin] Other (See Comments) and Myalgia 05/12/2016    Omeprazole Nausea Only and Other (See Comments) 04/20/2016    Pravachol [pravastatin] Nausea Only and GI Intolerance 03/24/2017    Sulindac Other  (See Comments) and Myalgia 04/20/2016    Valdecoxib Irritability 04/20/2016    Chlorcyclizine Unknown - Low Severity 06/21/2013    Diclofenac sodium Unknown - Low Severity 06/21/2013    Diphenhydramine Unknown - Low Severity 04/20/2016    Lodine [etodolac] Unknown - Low Severity 03/24/2017    Sulfa antibiotics Unknown - Low Severity 04/20/2016       Medication information was obtained from: Patient   Pharmacy and Phone Number:     Prior to Admission Medications       Prescriptions Last Dose Informant Patient Reported? Taking?    acetaminophen (TYLENOL) 325 MG tablet 8/26/2024  No Yes    Take 2 tablets by mouth Every 4 (Four) Hours As Needed for Mild Pain .    Acetylcysteine (NAC PO) 8/26/2024 Self Yes Yes    Take 1,000 mg by mouth 2 (Two) Times a Day.    albuterol (PROVENTIL) (2.5 MG/3ML) 0.083% nebulizer solution 8/25/2024 Self Yes Yes    Take 2.5 mg by nebulization Every 4 (Four) Hours.    Benralizumab (FASENRA SC) Past Week Self Yes Yes    Inject 1 dose under the skin into the appropriate area as directed Every 2 (Two) Months. Every 8 weeks    budesonide (PULMICORT) 0.5 MG/2ML nebulizer solution 8/25/2024 Self Yes Yes    Take 2 mL by nebulization 2 (Two) Times a Day.    Calcium Carbonate-Vitamin D (calcium 500 mg vitamin D 5 mcg, 200 UT,) 500-5 MG-MCG tablet per tablet 8/26/2024 Self No Yes    Take 1 tablet by mouth 2 (Two) Times a Day.    carvedilol (COREG) 3.125 MG tablet 8/26/2024 Pharmacy, Self No Yes    TAKE 1 TABLET TWICE A DAY    cephalexin (KEFLEX) 500 MG capsule 8/26/2024 Pharmacy, Self Yes Yes    Take 1 capsule by mouth 3 (Three) Times a Day.    Cetirizine HCl 10 MG capsule 8/26/2024 Self Yes Yes    Take 1 capsule by mouth Daily.    fluticasone (FLONASE) 50 MCG/ACT nasal spray 8/26/2024 Self Yes Yes    1 spray into the nostril(s) as directed by provider Daily.    furosemide (LASIX) 20 MG tablet 8/26/2024 Pharmacy, Self No Yes    Take 1 tablet by mouth As Needed (For weight gain of greater than 2 pounds  in 1 day or 5 pounds in 1 week).    Patient taking differently:  Take 1 tablet by mouth Daily.    glipizide (GLUCOTROL) 5 MG tablet 8/26/2024 Pharmacy, Self Yes Yes    Take 1 tablet by mouth Daily With Breakfast.    loperamide (IMODIUM) 2 MG capsule  Self Yes Yes    Take 1 capsule by mouth 4 (Four) Times a Day As Needed for Diarrhea.    melatonin 5 MG tablet tablet 8/25/2024 Self Yes Yes    Take 1 tablet by mouth Every Night.    metFORMIN ER (GLUCOPHAGE-XR) 500 MG 24 hr tablet 8/26/2024 Pharmacy, Self Yes Yes    Take 1 tablet by mouth Daily With Breakfast.    montelukast (SINGULAIR) 10 MG tablet 8/25/2024 Self, Pharmacy Yes Yes    Take 1 tablet by mouth Every Night.    oxybutynin XL (DITROPAN-XL) 10 MG 24 hr tablet 8/25/2024 Self, Pharmacy Yes Yes    Take 1 tablet by mouth 2 (Two) Times a Day.    rosuvastatin (CRESTOR) 20 MG tablet 8/25/2024 Self, Pharmacy Yes Yes    Take 1 tablet by mouth Every Night.    warfarin (COUMADIN) 4 MG tablet 8/25/2024 Pharmacy, Self No Yes    TAKE ONE-HALF (1/2) TABLET ON SUNDAY AND FRIDAY AND TAKE ONE TABLET ALL OTHER DAYS OR AS DIRECTED    Patient taking differently:  Take  by mouth See Admin Instructions. TAKE ONE-HALF (1/2) TABLET (2 MG) ON TUESDAYS AND THURSDAYS AND TAKE ONE TABLET (4 MG) ALL OTHER DAYS OR AS DIRECTED              Medication notes:  Patient is not sure what days she takes the half tablets of her warfarin. According to the med management notes the patient is taking half tablets on Tuesday and Thursdays. Patient states she might be taking the half tablets on Sundays and Fridays.     This medication list is complete to the best of my knowledge as of 8/26/2024    Please call if questions.    Robb Silva  Medication History Technician  [phone]    8/26/2024 16:00 EDT

## 2024-08-26 NOTE — CASE MANAGEMENT/SOCIAL WORK
Case Management Discharge Note      Final Note: Dc home         Selected Continued Care - Discharged on 8/25/2024 Admission date: 8/22/2024 - Discharge disposition: Home or Self Care      Destination    No services have been selected for the patient.                Durable Medical Equipment    No services have been selected for the patient.                Dialysis/Infusion    No services have been selected for the patient.                Home Medical Care    No services have been selected for the patient.                Therapy    No services have been selected for the patient.                Community Resources    No services have been selected for the patient.                Community & DME    No services have been selected for the patient.                         Final Discharge Disposition Code: 01 - home or self-care

## 2024-08-26 NOTE — ED NOTES
Nursing report ED to floor  Caitlyn Longoria  89 y.o.  female    HPI :  HPI (Adult)  Stated Reason for Visit: leg swelling  History Obtained From: EMS    Chief Complaint  Chief Complaint   Patient presents with    Leg Swelling       Admitting doctor:   Sergey Mondragon MD    Admitting diagnosis:   The primary encounter diagnosis was Cellulitis of left leg. A diagnosis of Chronic anticoagulation was also pertinent to this visit.    Code status:   Current Code Status       Date Active Code Status Order ID Comments User Context       Prior            Allergies:   Accupril [quinapril hcl], Ahist [chlorpheniramine], Clarithromycin, Esomeprazole, Latex, Levalbuterol, Levocetirizine, Lipitor [atorvastatin], Omeprazole, Pravachol [pravastatin], Sulindac, Valdecoxib, Chlorcyclizine, Diclofenac sodium, Diphenhydramine, Lodine [etodolac], and Sulfa antibiotics    Isolation:   No active isolations    Intake and Output  No intake or output data in the 24 hours ending 08/26/24 1513    Weight:   There were no vitals filed for this visit.    Most recent vitals:   Vitals:    08/26/24 1052 08/26/24 1126   BP: 115/72 138/87   Pulse: 64    Resp: 16    Temp: 96.9 °F (36.1 °C)    TempSrc: Tympanic    SpO2: 98%        Active LDAs/IV Access:   Lines, Drains & Airways       Active LDAs       Name Placement date Placement time Site Days    Peripheral IV 08/26/24 1143 Right Antecubital 08/26/24  1143  Antecubital  less than 1    External Urinary Catheter 08/22/24  1700  --  3                    Labs (abnormal labs have a star):   Labs Reviewed   COMPREHENSIVE METABOLIC PANEL - Abnormal; Notable for the following components:       Result Value    ALT (SGPT) 175 (*)     AST (SGOT) 217 (*)     Alkaline Phosphatase 218 (*)     Total Bilirubin 2.5 (*)     eGFR 54.0 (*)     All other components within normal limits    Narrative:     GFR Normal >60  Chronic Kidney Disease <60  Kidney Failure <15    The GFR formula is only valid for adults with  stable renal function between ages 18 and 70.   PROTIME-INR - Abnormal; Notable for the following components:    Protime 25.3 (*)     INR 2.27 (*)     All other components within normal limits   CBC WITH AUTO DIFFERENTIAL - Abnormal; Notable for the following components:    WBC 18.79 (*)     .5 (*)     All other components within normal limits   MANUAL DIFFERENTIAL - Abnormal; Notable for the following components:    Neutrophil % 25.3 (*)     Lymphocyte % 72.7 (*)     Monocyte % 2.0 (*)     Eosinophil % 0.0 (*)     Lymphocytes Absolute 13.66 (*)     All other components within normal limits   LACTIC ACID, PLASMA - Normal   BLOOD CULTURE   BLOOD CULTURE   CBC AND DIFFERENTIAL    Narrative:     The following orders were created for panel order CBC & Differential.  Procedure                               Abnormality         Status                     ---------                               -----------         ------                     CBC Auto Differential[[id number]]        Abnormal            Final result                 Please view results for these tests on the individual orders.       EKG:   No orders to display       Meds given in ED:   Medications   cefTRIAXone (ROCEPHIN) 2,000 mg in sodium chloride 0.9 % 100 mL MBP (2,000 mg Intravenous New Bag 8/26/24 1216)       Imaging results:  No radiology results for the last day    Ambulatory status:   - bedrest    Social issues:   Social History     Socioeconomic History    Marital status: Single   Tobacco Use    Smoking status: Never     Passive exposure: Never    Smokeless tobacco: Never    Tobacco comments:     caffeine use- soda   Vaping Use    Vaping status: Never Used   Substance and Sexual Activity    Alcohol use: Never    Drug use: No    Sexual activity: Defer       Peripheral Neurovascular  Peripheral Neurovascular (Adult)  Peripheral Neurovascular WDL: .WDL except, pulse assessment  Pulse Assessment: dorsalis pedis  Additional Documentation: Edema  (Group)  Edema  Edema: ankle, left, ankle, right  Ankle, Left Edema: 2+ (Mild)  Ankle, Right Edema: 4+ (Severe)    Neuro Cognitive  Neuro Cognitive (Adult)  Cognitive/Neuro/Behavioral WDL: WDL, level of consciousness, orientation  Level of Consciousness: Alert  Orientation: oriented x 4    Learning  Learning Assessment (Adult)  Learning Readiness and Ability: no barriers identified  Education Provided  Person Taught: patient    Respiratory  Respiratory WDL  Respiratory WDL: .WDL except, cough  Cough Frequency: frequent  Cough Type: good, nonproductive    Abdominal Pain       Pain Assessments  Pain (Adult)  (0-10) Pain Rating: Rest: 6    NIH Stroke Scale       Cory Dash RN  08/26/24 15:13 EDT

## 2024-08-27 ENCOUNTER — READMISSION MANAGEMENT (OUTPATIENT)
Dept: CALL CENTER | Facility: HOSPITAL | Age: 89
End: 2024-08-27
Payer: MEDICARE

## 2024-08-27 LAB
ANION GAP SERPL CALCULATED.3IONS-SCNC: 10.9 MMOL/L (ref 5–15)
ANISOCYTOSIS BLD QL: ABNORMAL
BUN SERPL-MCNC: 16 MG/DL (ref 8–23)
BUN/CREAT SERPL: 19.3 (ref 7–25)
CALCIUM SPEC-SCNC: 8.5 MG/DL (ref 8.6–10.5)
CHLORIDE SERPL-SCNC: 102 MMOL/L (ref 98–107)
CO2 SERPL-SCNC: 23.1 MMOL/L (ref 22–29)
CREAT SERPL-MCNC: 0.83 MG/DL (ref 0.57–1)
DEPRECATED RDW RBC AUTO: 47.9 FL (ref 37–54)
EGFRCR SERPLBLD CKD-EPI 2021: 67.5 ML/MIN/1.73
ERYTHROCYTE [DISTWIDTH] IN BLOOD BY AUTOMATED COUNT: 13 % (ref 12.3–15.4)
GLUCOSE SERPL-MCNC: 80 MG/DL (ref 65–99)
HCT VFR BLD AUTO: 35.1 % (ref 34–46.6)
HGB BLD-MCNC: 11 G/DL (ref 12–15.9)
INR PPP: 2.7 (ref 0.9–1.1)
LYMPHOCYTES # BLD MANUAL: 12.28 10*3/MM3 (ref 0.7–3.1)
LYMPHOCYTES NFR BLD MANUAL: 3 % (ref 5–12)
MCH RBC QN AUTO: 31.4 PG (ref 26.6–33)
MCHC RBC AUTO-ENTMCNC: 31.3 G/DL (ref 31.5–35.7)
MCV RBC AUTO: 100.3 FL (ref 79–97)
MONOCYTES # BLD: 0.52 10*3/MM3 (ref 0.1–0.9)
NEUTROPHILS # BLD AUTO: 4.5 10*3/MM3 (ref 1.7–7)
NEUTROPHILS NFR BLD MANUAL: 26 % (ref 42.7–76)
NRBC BLD AUTO-RTO: 0 /100 WBC (ref 0–0.2)
OVALOCYTES BLD QL SMEAR: ABNORMAL
PLAT MORPH BLD: NORMAL
PLATELET # BLD AUTO: 144 10*3/MM3 (ref 140–450)
PMV BLD AUTO: 10 FL (ref 6–12)
POIKILOCYTOSIS BLD QL SMEAR: ABNORMAL
POTASSIUM SERPL-SCNC: 3.7 MMOL/L (ref 3.5–5.2)
PROTHROMBIN TIME: 28.9 SECONDS (ref 11.7–14.2)
RBC # BLD AUTO: 3.5 10*6/MM3 (ref 3.77–5.28)
SODIUM SERPL-SCNC: 136 MMOL/L (ref 136–145)
VANCOMYCIN SERPL-MCNC: 25.6 MCG/ML (ref 5–40)
VARIANT LYMPHS NFR BLD MANUAL: 71 % (ref 19.6–45.3)
WBC MORPH BLD: NORMAL
WBC NRBC COR # BLD AUTO: 17.29 10*3/MM3 (ref 3.4–10.8)

## 2024-08-27 PROCEDURE — 94799 UNLISTED PULMONARY SVC/PX: CPT

## 2024-08-27 PROCEDURE — 80202 ASSAY OF VANCOMYCIN: CPT | Performed by: INTERNAL MEDICINE

## 2024-08-27 PROCEDURE — 94664 DEMO&/EVAL PT USE INHALER: CPT

## 2024-08-27 PROCEDURE — 97162 PT EVAL MOD COMPLEX 30 MIN: CPT

## 2024-08-27 PROCEDURE — 97535 SELF CARE MNGMENT TRAINING: CPT

## 2024-08-27 PROCEDURE — 80048 BASIC METABOLIC PNL TOTAL CA: CPT | Performed by: INTERNAL MEDICINE

## 2024-08-27 PROCEDURE — 25010000002 VANCOMYCIN 10 G RECONSTITUTED SOLUTION: Performed by: INTERNAL MEDICINE

## 2024-08-27 PROCEDURE — 25010000002 VANCOMYCIN 1 G RECONSTITUTED SOLUTION 1 EACH VIAL: Performed by: INTERNAL MEDICINE

## 2024-08-27 PROCEDURE — 85610 PROTHROMBIN TIME: CPT | Performed by: INTERNAL MEDICINE

## 2024-08-27 PROCEDURE — 25810000003 SODIUM CHLORIDE 0.9 % SOLUTION: Performed by: INTERNAL MEDICINE

## 2024-08-27 PROCEDURE — 25010000002 CEFEPIME PER 500 MG: Performed by: INTERNAL MEDICINE

## 2024-08-27 PROCEDURE — 97110 THERAPEUTIC EXERCISES: CPT

## 2024-08-27 PROCEDURE — 97166 OT EVAL MOD COMPLEX 45 MIN: CPT

## 2024-08-27 PROCEDURE — 94761 N-INVAS EAR/PLS OXIMETRY MLT: CPT

## 2024-08-27 PROCEDURE — 85025 COMPLETE CBC W/AUTO DIFF WBC: CPT | Performed by: INTERNAL MEDICINE

## 2024-08-27 PROCEDURE — 25810000003 SODIUM CHLORIDE 0.9 % SOLUTION 250 ML FLEX CONT: Performed by: INTERNAL MEDICINE

## 2024-08-27 PROCEDURE — 85007 BL SMEAR W/DIFF WBC COUNT: CPT | Performed by: INTERNAL MEDICINE

## 2024-08-27 RX ADMIN — Medication 10 ML: at 20:21

## 2024-08-27 RX ADMIN — CEFEPIME 1000 MG: 1 INJECTION, POWDER, FOR SOLUTION INTRAMUSCULAR; INTRAVENOUS at 06:43

## 2024-08-27 RX ADMIN — ALBUTEROL SULFATE 2.5 MG: 2.5 SOLUTION RESPIRATORY (INHALATION) at 07:27

## 2024-08-27 RX ADMIN — Medication 10 ML: at 08:30

## 2024-08-27 RX ADMIN — BUDESONIDE 0.5 MG: 0.5 INHALANT RESPIRATORY (INHALATION) at 07:28

## 2024-08-27 RX ADMIN — CEFEPIME 1000 MG: 1 INJECTION, POWDER, FOR SOLUTION INTRAMUSCULAR; INTRAVENOUS at 14:26

## 2024-08-27 RX ADMIN — CARVEDILOL 3.12 MG: 3.12 TABLET, FILM COATED ORAL at 08:29

## 2024-08-27 RX ADMIN — VANCOMYCIN HYDROCHLORIDE 1000 MG: 1 INJECTION, POWDER, LYOPHILIZED, FOR SOLUTION INTRAVENOUS at 23:00

## 2024-08-27 RX ADMIN — OXYBUTYNIN CHLORIDE 10 MG: 10 TABLET, EXTENDED RELEASE ORAL at 08:29

## 2024-08-27 RX ADMIN — ROSUVASTATIN CALCIUM 20 MG: 20 TABLET, FILM COATED ORAL at 20:21

## 2024-08-27 RX ADMIN — ALBUTEROL SULFATE 2.5 MG: 2.5 SOLUTION RESPIRATORY (INHALATION) at 19:21

## 2024-08-27 RX ADMIN — ALBUTEROL SULFATE 2.5 MG: 2.5 SOLUTION RESPIRATORY (INHALATION) at 23:37

## 2024-08-27 RX ADMIN — Medication 1500 MG: at 00:28

## 2024-08-27 RX ADMIN — OXYBUTYNIN CHLORIDE 10 MG: 10 TABLET, EXTENDED RELEASE ORAL at 20:21

## 2024-08-27 RX ADMIN — CETIRIZINE HYDROCHLORIDE 10 MG: 10 TABLET ORAL at 20:21

## 2024-08-27 RX ADMIN — FLUTICASONE PROPIONATE 1 SPRAY: 50 SPRAY, METERED NASAL at 08:29

## 2024-08-27 RX ADMIN — ALBUTEROL SULFATE 2.5 MG: 2.5 SOLUTION RESPIRATORY (INHALATION) at 10:25

## 2024-08-27 RX ADMIN — MONTELUKAST SODIUM 10 MG: 10 TABLET, FILM COATED ORAL at 20:21

## 2024-08-27 RX ADMIN — BUDESONIDE 0.5 MG: 0.5 INHALANT RESPIRATORY (INHALATION) at 19:21

## 2024-08-27 RX ADMIN — CARVEDILOL 3.12 MG: 3.12 TABLET, FILM COATED ORAL at 20:21

## 2024-08-27 RX ADMIN — ALBUTEROL SULFATE 2.5 MG: 2.5 SOLUTION RESPIRATORY (INHALATION) at 02:03

## 2024-08-27 RX ADMIN — WARFARIN 2 MG: 2 TABLET ORAL at 17:32

## 2024-08-27 RX ADMIN — FUROSEMIDE 20 MG: 20 TABLET ORAL at 08:29

## 2024-08-27 NOTE — OUTREACH NOTE
Medical Week 1 Survey      Flowsheet Row Responses   [facility] patient discharged from? [place]   Does the patient have one of the following disease processes/diagnoses(primary or secondary)? Other   Week 1 attempt successful? No   Unsuccessful attempts Attempt 1   Revoke Readmitted            Jayde WOO - Registered Nurse

## 2024-08-27 NOTE — PROGRESS NOTES
[facility] Clinical Pharmacy Services: Vancomycin Monitoring Note    Caitlyn Longoria is a 89 y.o. female who is on day 1/7 of pharmacy to dose vancomycin for Intra-Abdominal Infection.    Previous Vancomycin Dose:    1500 mg IV x1 on 8/27 0028  Updated Cultures and Sensitivities: 8/26 BloodCx - pending x2   Results from last 7 days   Lab Units 08/27/24  0540   VANCOMYCIN RM mcg/mL 25.60     Vitals/Labs  Ht:  ; Wt: 80.5 kg (177 lb 6.4 oz)   Temp Readings from Last 1 Encounters:   08/27/24 98.4 °F (36.9 °C) (Oral)     Estimated Creatinine Clearance: 46.1 mL/min (by C-G formula based on SCr of 0.83 mg/dL).       Results from last 7 days   Lab Units 08/27/24  0540 08/26/24  1139 08/25/24  0655   CREATININE mg/dL 0.83 1.00 0.79   WBC 10*3/mm3 17.29* 18.79* 15.65*     Assessment/Plan    Current Vancomycin Dose: 1000 mg IV every  24  hours; provides a predicted  mg/L.hr   Next Level Date and Time: Vanc Trough on 8/29 at 2130  We will continue to monitor patient changes and renal function     Thank you for involving pharmacy in this patient's care. Please contact pharmacy with any questions or concerns.       Ivana Brownlee, PharmD  Clinical Pharmacist

## 2024-08-27 NOTE — THERAPY EVALUATION
Patient Name: Caitlyn Longoria  : 1934    MRN: [id number]                              Today's Date: 2024       Admit Date: 2024    Visit Dx:     ICD-10-CM ICD-9-CM   1. Cellulitis of left leg  L03.116 682.6   2. Chronic anticoagulation  Z79.01 V58.61     Patient Active Problem List   Diagnosis    Neck and shoulder pain    Arthropathy of shoulder region    Carpal tunnel syndrome of left wrist    Chronic pain of both shoulders    Chronic left shoulder pain    Arthropathy of left shoulder    Dysarthria    Type 2 diabetes mellitus with atherosclerosis of aorta    Paroxysmal atrial fibrillation    HLD (hyperlipidemia)    Chronic bronchitis    Coronary artery disease    CVA (cerebral vascular accident)    HTN (hypertension)    CKD (chronic kidney disease), stage III    Chronic combined systolic (congestive) and diastolic (congestive) heart failure    Infection of prosthetic right knee joint    Stasis dermatitis of right lower extremity due to peripheral venous hypertension    Current use of long term anticoagulation    Morbid obesity    Acute respiratory failure with hypoxia    Rhinovirus    Asthma with acute exacerbation    Acute respiratory failure with hypoxemia    Viral pneumonia    Hypoxia    CLL (chronic lymphocytic leukemia)    Dyspnea    Anticoagulated on Coumadin    Osteoporotic compression fracture of spine    Pneumonia due to gram-negative bacteria    Pneumonia due to infectious organism    Bilateral carotid artery disease    Hypogammaglobulinemia    Hypogammaglobulinemia    Cellulitis of right lower extremity    Hypokalemia    Nocturnal hypoxia    COPD (chronic obstructive pulmonary disease)    Upper respiratory tract infection    COPD (chronic obstructive pulmonary disease)    Sick sinus syndrome    Anemia    Thrombocytopenia    Chronic HFrEF (heart failure with reduced ejection fraction)    Cellulitis of left lower leg     Past Medical History:   Diagnosis Date    Aortic calcification      mild, on echo 12/17/2017    Aortic regurgitation     Trace    Asthma     Atrial fibrillation     CAD (coronary artery disease)     Carpal tunnel syndrome of left wrist     Chronic combined systolic and diastolic congestive heart failure     CKD (chronic kidney disease) stage 3, GFR 30-59 ml/min     COPD (chronic obstructive pulmonary disease)     Coronary artery disease involving native coronary artery of native heart with angina pectoris     Disc degeneration, lumbar     Diverticulosis     DM type 2 (diabetes mellitus, type 2)     GERD (gastroesophageal reflux disease)     History of aneurysm     right femoral artery s/p LHC    History of blood transfusion     History of fracture     History of heart attack     History of vitamin D deficiency     Hyperlipidemia     Hypertension     Leukemia     Mild mitral regurgitation     Mitral annular calcification     12/8/2017- echo, moderate    Osteopenia     PAF (paroxysmal atrial fibrillation)     Peripheral neuropathy     Skin cancer     Left hand    Sleep apnea     bipap    SSS (sick sinus syndrome)     Stroke (cerebrum)     TIA (transient ischemic attack) 2017    Tricuspid regurgitation     Trace     Past Surgical History:   Procedure Laterality Date    BRONCHOSCOPY Bilateral 10/6/2020    Procedure: BRONCHOSCOPY with BILATERAL LUNG washings;  Surgeon: Juno Coburn MD;  Location: [facility] ENDOSCOPY;  Service: Pulmonary;  Laterality: Bilateral;  PRE: purulent bronchitis  POST: PURULENT BRONCHITIS    BRONCHOSCOPY Bilateral 10/9/2020    Procedure: BRONCHOSCOPY with washing;  Surgeon: Juno Coburn MD;  Location: [facility] ENDOSCOPY;  Service: Pulmonary;  Laterality: Bilateral;  pre/post - mucous plug      CARDIAC CATHETERIZATION      CARDIAC ELECTROPHYSIOLOGY PROCEDURE N/A 2/7/2020    Procedure: PPM generator change - dual  medtronic;  Surgeon: Kiel Field MD;  Location: [facility] CATH INVASIVE LOCATION;  Service: Cardiology;  Laterality: N/A;    CHOLECYSTECTOMY       CORONARY STENT PLACEMENT      ENDOSCOPY N/A 9/14/2022    Procedure: ESOPHAGOGASTRODUODENOSCOPY with 54fr main dilatation;  Surgeon: Enio Cota MD;  Location: [facility] ENDOSCOPY;  Service: Gastroenterology;  Laterality: N/A;  pre - dysphagia  post - s/p dilatation, watermelon stomach    HERNIA REPAIR      hital hernia    HYSTERECTOMY      PACEMAKER IMPLANTATION      REPLACEMENT TOTAL KNEE Bilateral       General Information       Row Name 08/27/24 1133          Physical Therapy Time and Intention    Document Type evaluation  -PC     Mode of Treatment physical therapy  -PC       Row Name 08/27/24 1133          General Information    Patient Profile Reviewed yes  -PC     Prior Level of Function independent:  -PC     Existing Precautions/Restrictions fall  -PC     Barriers to Rehab medically complex;previous functional deficit  -PC       Row Name 08/27/24 1133          Living Environment    People in Home --  independent apartment in an assisted living facility  -PC       Row Name 08/27/24 1133          Cognition    Orientation Status (Cognition) oriented x 4  -PC       Row Name 08/27/24 1133          Safety Issues, Functional Mobility    Impairments Affecting Function (Mobility) endurance/activity tolerance;strength  -PC               User Key  (r) = Recorded By, (t) = Taken By, (c) = Cosigned By      Initials Name Provider Type    PC Deepa Thomas, PT Physical Therapist                   Mobility       Row Name 08/27/24 1134          Bed Mobility    Bed Mobility supine-sit  -PC     Supine-Sit [place] (Bed Mobility) minimum assist (75% patient effort)  -PC       Row Name 08/27/24 1134          Sit-Stand Transfer    Sit-Stand [place] (Transfers) contact guard  -PC     Assistive Device (Sit-Stand Transfers) walker, 4-wheeled  -PC       Row Name 08/27/24 1134          Gait/Stairs (Locomotion)    [place] Level (Gait) contact guard  -PC     Assistive Device (Gait) walker, 4-wheeled  -PC     Distance  in Feet (Gait) 60  -PC     Deviations/Abnormal Patterns (Gait) antalgic;gait speed decreased;stride length decreased  -PC     Bilateral Gait Deviations forward flexed posture;heel strike decreased  -PC     Comment, (Gait/Stairs) forward flexed with kyphosis, at times she leaned on the rollator with her forearms  -PC               User Key  (r) = Recorded By, (t) = Taken By, (c) = Cosigned By      Initials Name Provider Type    PC Deepa Thomas PT Physical Therapist                   Obj/Interventions       Row Name 08/27/24 1149          Range of Motion Comprehensive    General Range of Motion bilateral lower extremity ROM WNL  -PC       Row Name 08/27/24 1149          Strength Comprehensive (MMT)    Comment, General Manual Muscle Testing (MMT) Assessment RLE 3+/5, LLE 3-/5  -PC       Row Name 08/27/24 1149          Balance    Balance Assessment sitting static balance;sitting dynamic balance;standing static balance;standing dynamic balance  -PC     Static Sitting Balance supervision  -PC     Dynamic Sitting Balance supervision  -PC     Position, Sitting Balance sitting edge of bed  -PC     Static Standing Balance supervision  -PC     Dynamic Standing Balance contact guard  -PC               User Key  (r) = Recorded By, (t) = Taken By, (c) = Cosigned By      Initials Name Provider Type    PC Deepa Thomas PT Physical Therapist                   Goals/Plan       Row Name 08/27/24 1156          Bed Mobility Goal 1 (PT)    Activity/Assistive Device (Bed Mobility Goal 1, PT) bed mobility activities, all  -PC     [place] Level/Cues Needed (Bed Mobility Goal 1, PT) supervision required  -PC     Time Frame (Bed Mobility Goal 1, PT) 1 week  -PC       Row Name 08/27/24 1156          Transfer Goal 1 (PT)    Activity/Assistive Device (Transfer Goal 1, PT) sit-to-stand/stand-to-sit  -PC     [place] Level/Cues Needed (Transfer Goal 1, PT) supervision required  -PC     Time Frame (Transfer Goal 1, PT) 1 week  -PC        Row Name 08/27/24 1156          Gait Training Goal 1 (PT)    Activity/Assistive Device (Gait Training Goal 1, PT) gait (walking locomotion);assistive device use  -PC     [place] Level (Gait Training Goal 1, PT) supervision required  -PC     Distance (Gait Training Goal 1, PT) 75  -PC     Time Frame (Gait Training Goal 1, PT) 1 week  -PC       Row Name 08/27/24 1158          Therapy Assessment/Plan (PT)    Planned Therapy Interventions (PT) balance training;bed mobility training;gait training;transfer training;strengthening  -PC               User Key  (r) = Recorded By, (t) = Taken By, (c) = Cosigned By      Initials Name Provider Type    PC Deepa Thomas, PT Physical Therapist                   Clinical Impression       Row Name 08/27/24 1157          Pain    Pain Location - Side/Orientation Left  -PC     Pain Location lower  -PC     Pain Location - extremity  -PC     Pre/Posttreatment Pain Comment Did not rate, but does report pain L calf, inc with weight bearing but was able to tolerate  -PC     Pain Intervention(s) Repositioned;Elevated  -PC       Row Name 08/27/24 115          Plan of Care Review    Plan of Care Reviewed With patient  -PC     Outcome Evaluation Pt is an 89 yo female adm with L LE celuulitis, she was recently discharged from here with an URI, she lives in an independent living apartment at [facility] and uses a rollator, is independent with all activities/mobility with her rollator. Pt presents with swelling and redness LLE, pain and weakness with impaired functional mobility, she fatigued quickly, she needed min assist to get out of bed, walked 40 ft with CGA with rollator, needed some assist with toileting. Pt will benefit from PT and goal is to return to independent living, will follow  -PC       Row Name 08/27/24 1158          Therapy Assessment/Plan (PT)    Rehab Potential (PT) good, to achieve stated therapy goals  -PC     Criteria for Skilled Interventions Met (PT)  yes;meets criteria  -PC     Therapy Frequency (PT) 6 times/wk  -PC       Row Name 08/27/24 1152          Positioning and Restraints    Pre-Treatment Position in bed  -PC     Post Treatment Position chair  -PC     In Chair reclined;call light within reach;encouraged to call for assist;exit alarm on  -PC               User Key  (r) = Recorded By, (t) = Taken By, (c) = Cosigned By      Initials Name Provider Type    PC Deepa Thomas, PT Physical Therapist                   Outcome Measures       Row Name 08/27/24 1156          How much help from another person do you currently need...    Turning from your back to your side while in flat bed without using bedrails? 3  -PC     Moving from lying on back to sitting on the side of a flat bed without bedrails? 3  -PC     Moving to and from a bed to a chair (including a wheelchair)? 3  -PC     Standing up from a chair using your arms (e.g., wheelchair, bedside chair)? 3  -PC     Climbing 3-5 steps with a railing? 2  -PC     To walk in hospital room? 3  -PC     AM-PAC 6 Clicks Score (PT) 17  -PC     Highest Level of Mobility Goal 5 --> Static standing  -PC       Row Name 08/27/24 1224 08/27/24 1156       Functional Assessment    Outcome Measure Options AM-PAC 6 Clicks Daily Activity (OT)  -PP AM-PAC 6 Clicks Basic Mobility (PT)  -PC              User Key  (r) = Recorded By, (t) = Taken By, (c) = Cosigned By      Initials Name Provider Type    PC Deepa Thomas, PT Physical Therapist    Pinky Vick, OT Occupational Therapist                                 Physical Therapy Education       Title: PT OT SLP Therapies (In Progress)       Topic: Physical Therapy (Done)       Point: Mobility training (Done)       Learning Progress Summary             Patient Acceptance, E,D, DU by PC at 8/27/2024 1156                         Point: Home exercise program (Done)       Learning Progress Summary             Patient Acceptance, E,D, DU by PC at 8/27/2024 1156                          Point: Body mechanics (Done)       Learning Progress Summary             Patient Acceptance, E,D, DU by PC at 8/27/2024 1156                         Point: Precautions (Done)       Learning Progress Summary             Patient Acceptance, E,D, DU by PC at 8/27/2024 1156                                         User Key       Initials Effective Dates Name Provider Type Discipline     06/16/21 -  Deepa Thomas PT Physical Therapist PT                  PT Recommendation and Plan  Planned Therapy Interventions (PT): balance training, bed mobility training, gait training, transfer training, strengthening  Plan of Care Reviewed With: patient  Outcome Evaluation: Pt is an 89 yo female adm with L MAXWELL aguilar, she was recently discharged from here with an URI, she lives in an independent living apartment at [facility] and uses a rollator, is independent with all activities/mobility with her rollator. Pt presents with swelling and redness LLE, pain and weakness with impaired functional mobility, she fatigued quickly, she needed min assist to get out of bed, walked 40 ft with CGA with rollator, needed some assist with toileting. Pt will benefit from PT and goal is to return to independent living, will follow     Time Calculation:         PT Charges       Row Name 08/27/24 1254             Time Calculation    Start Time 1042  -PC      Stop Time 1111  -PC      Time Calculation (min) 29 min  -PC      PT Received On 08/27/24  -PC      PT - Next Appointment 08/28/24  -PC      PT Goal Re-Cert Due Date 09/03/24  -                User Key  (r) = Recorded By, (t) = Taken By, (c) = Cosigned By      Initials Name Provider Type    PC Deepa Thomas PT Physical Therapist                  Therapy Charges for Today       Code Description Service Date Service Provider Modifiers Qty    75521821579  PT EVAL MOD COMPLEXITY 2 8/27/2024 Deepa Thomas, PT GP 1    [id number] HC PT THER PROC EA 15 MIN 8/27/2024 William  Deepa ELLIS, PT GP 1            PT G-Codes  Outcome Measure Options: AM-PAC 6 Clicks Daily Activity (OT)  AM-PAC 6 Clicks Score (PT): 17  AM-PAC 6 Clicks Score (OT): 15  PT Discharge Summary  Anticipated Discharge Disposition (PT): assisted living    Deepa Thomas, PT  8/27/2024

## 2024-08-27 NOTE — PLAN OF CARE
Goal Outcome Evaluation:  Plan of Care Reviewed With: patient           Outcome Evaluation: Pt is an 88 yo female adm with L LE celuulitis, she was recently discharged from here with an URI, she lives in an independent living apartment at [facility] and uses a rollator, is independent with all activities/mobility with her rollator. Pt presents with swelling and redness LLE, pain and weakness with impaired functional mobility, she fatigued quickly, she needed min assist to get out of bed, walked 40 ft with CGA with rollator, needed some assist with toileting. Pt will benefit from PT and goal is to return to independent living, will follow      Anticipated Discharge Disposition (PT): assisted living

## 2024-08-27 NOTE — PLAN OF CARE
Goal Outcome Evaluation:  Plan of Care Reviewed With: patient        Progress: no change  Outcome Evaluation: Pt is a 89 y/o female admitted to [facility] for LLE cellulitis. Pt PMHx includes CHF, DM type 2, CHF, paroxysmal A fib on warfin anticoagulant and CKD. She lives alone in [place] and uses rollator at [facility]. Today pt is A&O x4 and agreeable. She was Min A for supine to sit and Cga for STS from EOB using Rwx. She was Cga/ Rwx for fxnl mob to/from bed> BSC > chair. She presents w/ decreased endurance/ act hu and SOB during session. Pt also reports being continent, so purewick was removed during end of session and pt used BSC w/ Mod A for hygiene. However, at the beginning of the session, pt was noted to urinate urgently w/o warning using purewick. Pt's BUE ROM WFL but some decreased strength noted. Pt may benefit from acute skilled OT to address fxnl deficits in order to improve to PLOF. OT will continue to follow.      Anticipated Discharge Disposition (OT): home, home with assist

## 2024-08-27 NOTE — PLAN OF CARE
Goal Outcome Evaluation:   Pt resting on the bed, RA, alert and oriented, purwick on, infrequent cough, administered meds per mar, c/o pain refused pain med, placed new IV, will continue to monitor.        Problem: Adult Inpatient Plan of Care  Goal: Absence of Hospital-Acquired Illness or Injury  Intervention: Identify and Manage Fall Risk  Recent Flowsheet Documentation  Taken 8/27/2024 0616 by Pedro Valencia RN  Safety Promotion/Fall Prevention:   activity supervised   room organization consistent   safety round/check completed  Taken 8/27/2024 0416 by Pedro Valencia RN  Safety Promotion/Fall Prevention:   activity supervised   room organization consistent   safety round/check completed  Taken 8/27/2024 0211 by Pedro Valencia RN  Safety Promotion/Fall Prevention:   activity supervised   room organization consistent   safety round/check completed  Taken 8/27/2024 0015 by Pedro Valencia RN  Safety Promotion/Fall Prevention:   activity supervised   room organization consistent   safety round/check completed  Taken 8/26/2024 2212 by Pedro Valencia RN  Safety Promotion/Fall Prevention:   activity supervised   room organization consistent   safety round/check completed  Taken 8/26/2024 2035 by Pedro Valencia RN  Safety Promotion/Fall Prevention:   activity supervised   room organization consistent   safety round/check completed  Intervention: Prevent Skin Injury  Recent Flowsheet Documentation  Taken 8/27/2024 0616 by Pedro Valencia RN  Body Position: position changed independently  Taken 8/27/2024 0416 by Pedro Valencia RN  Body Position: position changed independently  Taken 8/27/2024 0211 by Pedro Valencia RN  Body Position: supine  Taken 8/27/2024 0015 by Pedro Valencia RN  Body Position:   left   tilted  Skin Protection: adhesive use limited  Taken 8/26/2024 2212 by Pedro Valencia RN  Body Position:   right   tilted  Taken 8/26/2024 2035 by Pedro Valencia RN  Body  Position: position changed independently  Skin Protection: adhesive use limited  Intervention: Prevent and Manage VTE (Venous Thromboembolism) Risk  Recent Flowsheet Documentation  Taken 8/27/2024 0015 by Pedro Valencia RN  Activity Management: bedrest  Range of Motion: active ROM (range of motion) encouraged  Taken 8/26/2024 2035 by Pedro Valencia RN  Activity Management: bedrest  Range of Motion: active ROM (range of motion) encouraged  Intervention: Prevent Infection  Recent Flowsheet Documentation  Taken 8/27/2024 0616 by Pedro Valencia RN  Infection Prevention:   hand hygiene promoted   rest/sleep promoted  Taken 8/27/2024 0416 by Pedro Valencia RN  Infection Prevention:   hand hygiene promoted   rest/sleep promoted  Taken 8/27/2024 0211 by Pedro Valencia RN  Infection Prevention:   hand hygiene promoted   rest/sleep promoted  Taken 8/27/2024 0015 by Pedro Valencia RN  Infection Prevention:   hand hygiene promoted   rest/sleep promoted  Taken 8/26/2024 2212 by Pedro Valencia RN  Infection Prevention:   hand hygiene promoted   rest/sleep promoted  Taken 8/26/2024 2035 by Pedro Valencia RN  Infection Prevention:   hand hygiene promoted   rest/sleep promoted  Goal: Optimal Comfort and Wellbeing  Intervention: Monitor Pain and Promote Comfort  Recent Flowsheet Documentation  Taken 8/26/2024 2035 by Pedro Valencia RN  Pain Management Interventions: quiet environment facilitated  Intervention: Provide Person-Centered Care  Recent Flowsheet Documentation  Taken 8/27/2024 0015 by Pedro Valencia RN  Trust Relationship/Rapport:   choices provided   care explained  Taken 8/26/2024 2035 by Pedro Valencia RN  Trust Relationship/Rapport:   care explained   choices provided     Problem: Asthma Comorbidity  Goal: Maintenance of Asthma Control  Intervention: Maintain Asthma Symptom Control  Recent Flowsheet Documentation  Taken 8/27/2024 0616 by Pedro Valencia RN  Medication  Review/Management: medications reviewed  Taken 8/27/2024 0416 by Pedro Valencia RN  Medication Review/Management: medications reviewed  Taken 8/27/2024 0211 by Pedro Valencia RN  Medication Review/Management: medications reviewed  Taken 8/27/2024 0015 by Pedro Valencia RN  Medication Review/Management: medications reviewed  Taken 8/26/2024 2212 by Pedro Valencia RN  Medication Review/Management: medications reviewed  Taken 8/26/2024 2035 by Pedro Valencia RN  Medication Review/Management: medications reviewed     Problem: Behavioral Health Comorbidity  Goal: Maintenance of Behavioral Health Symptom Control  Intervention: Maintain Behavioral Health Symptom Control  Recent Flowsheet Documentation  Taken 8/27/2024 0616 by Pedro Valencia RN  Medication Review/Management: medications reviewed  Taken 8/27/2024 0416 by Pedro Valencia RN  Medication Review/Management: medications reviewed  Taken 8/27/2024 0211 by Pedro Valencia RN  Medication Review/Management: medications reviewed  Taken 8/27/2024 0015 by Pedro Valencia RN  Medication Review/Management: medications reviewed  Taken 8/26/2024 2212 by Pedro Valencia RN  Medication Review/Management: medications reviewed  Taken 8/26/2024 2035 by Pedro Valencia RN  Medication Review/Management: medications reviewed     Problem: COPD (Chronic Obstructive Pulmonary Disease) Comorbidity  Goal: Maintenance of COPD Symptom Control  Intervention: Maintain COPD-Symptom Control  Recent Flowsheet Documentation  Taken 8/27/2024 0616 by Pedro Valencia RN  Medication Review/Management: medications reviewed  Taken 8/27/2024 0416 by Pedro Valencia RN  Medication Review/Management: medications reviewed  Taken 8/27/2024 0211 by Pedro Valencia RN  Medication Review/Management: medications reviewed  Taken 8/27/2024 0015 by Pedro Valencia RN  Medication Review/Management: medications reviewed  Taken 8/26/2024 2212 by Pedro Valencia  RN  Medication Review/Management: medications reviewed  Taken 8/26/2024 2035 by Pedro Valencia RN  Medication Review/Management: medications reviewed     Problem: Heart Failure Comorbidity  Goal: Maintenance of Heart Failure Symptom Control  Intervention: Maintain Heart Failure-Management  Recent Flowsheet Documentation  Taken 8/27/2024 0616 by Pedro Valencia RN  Medication Review/Management: medications reviewed  Taken 8/27/2024 0416 by Pedro Valencia RN  Medication Review/Management: medications reviewed  Taken 8/27/2024 0211 by Pedro Valencia RN  Medication Review/Management: medications reviewed  Taken 8/27/2024 0015 by Pedro Valencia RN  Medication Review/Management: medications reviewed  Taken 8/26/2024 2212 by Pedro Valencia RN  Medication Review/Management: medications reviewed  Taken 8/26/2024 2035 by Pedro Valencia RN  Medication Review/Management: medications reviewed     Problem: Hypertension Comorbidity  Goal: Blood Pressure in Desired Range  Intervention: Maintain Blood Pressure Management  Recent Flowsheet Documentation  Taken 8/27/2024 0616 by Pedro Valencia RN  Medication Review/Management: medications reviewed  Taken 8/27/2024 0416 by Pedro Valencia RN  Medication Review/Management: medications reviewed  Taken 8/27/2024 0211 by Pedro Valencia RN  Medication Review/Management: medications reviewed  Taken 8/27/2024 0015 by Pedro Valencia RN  Medication Review/Management: medications reviewed  Taken 8/26/2024 2212 by Pedro Valencia RN  Medication Review/Management: medications reviewed  Taken 8/26/2024 2035 by Pedro Valencia RN  Medication Review/Management: medications reviewed     Problem: Osteoarthritis Comorbidity  Goal: Maintenance of Osteoarthritis Symptom Control  Intervention: Maintain Osteoarthritis Symptom Control  Recent Flowsheet Documentation  Taken 8/27/2024 0616 by Pedro Valencia RN  Medication Review/Management: medications  reviewed  Taken 8/27/2024 0416 by Pedro Valencia RN  Medication Review/Management: medications reviewed  Taken 8/27/2024 0211 by Pedro Valencia RN  Medication Review/Management: medications reviewed  Taken 8/27/2024 0015 by Pedro Valencia RN  Activity Management: bedrest  Medication Review/Management: medications reviewed  Taken 8/26/2024 2212 by Pedro Valencia RN  Medication Review/Management: medications reviewed  Taken 8/26/2024 2035 by Pedro Valencia RN  Activity Management: bedrest  Medication Review/Management: medications reviewed     Problem: Pain Chronic (Persistent) (Comorbidity Management)  Goal: Acceptable Pain Control and Functional Ability  Intervention: Manage Persistent Pain  Recent Flowsheet Documentation  Taken 8/27/2024 0616 by Pedro Valencia RN  Medication Review/Management: medications reviewed  Taken 8/27/2024 0416 by Pedro Valencia RN  Medication Review/Management: medications reviewed  Taken 8/27/2024 0211 by Pedro Valencia RN  Medication Review/Management: medications reviewed  Taken 8/27/2024 0015 by Pedro Valencia RN  Medication Review/Management: medications reviewed  Taken 8/26/2024 2212 by Pedro Valencia RN  Medication Review/Management: medications reviewed  Taken 8/26/2024 2035 by Pedro Valencia RN  Medication Review/Management: medications reviewed  Intervention: Develop Pain Management Plan  Recent Flowsheet Documentation  Taken 8/26/2024 2035 by Pedro Valencia RN  Pain Management Interventions: quiet environment facilitated     Problem: Skin Injury Risk Increased  Goal: Skin Health and Integrity  Intervention: Optimize Skin Protection  Recent Flowsheet Documentation  Taken 8/27/2024 0616 by Pedro Valencia RN  Head of Bed (HOB) Positioning: HOB elevated  Taken 8/27/2024 0416 by Pedro Valencia RN  Head of Bed (HOB) Positioning: HOB elevated  Taken 8/27/2024 0211 by Pedro Valencia RN  Head of Bed (HOB) Positioning: HOB  elevated  Taken 8/27/2024 0015 by Pedro Valencia RN  Pressure Reduction Techniques:   frequent weight shift encouraged   weight shift assistance provided  Head of Bed (HOB) Positioning: HOB elevated  Pressure Reduction Devices:   alternating pressure pump (ADD)   pressure-redistributing mattress utilized  Skin Protection: adhesive use limited  Taken 8/26/2024 2212 by Pedro Valencia RN  Head of Bed (HOB) Positioning: HOB elevated  Taken 8/26/2024 2035 by Pedro Valencia RN  Pressure Reduction Techniques:   frequent weight shift encouraged   weight shift assistance provided  Head of Bed (HOB) Positioning: [facility] elevated  Pressure Reduction Devices:   alternating pressure pump (ADD)   pressure-redistributing mattress utilized  Skin Protection: adhesive use limited     Problem: Infection Progression (Sepsis/Septic Shock)  Goal: Absence of Infection Signs and Symptoms  Intervention: Initiate Sepsis Management  Recent Flowsheet Documentation  Taken 8/27/2024 0616 by Pedro Valencia RN  Infection Prevention:   hand hygiene promoted   rest/sleep promoted  Taken 8/27/2024 0416 by Pedro Valencia RN  Infection Prevention:   hand hygiene promoted   rest/sleep promoted  Taken 8/27/2024 0211 by Pedro Valencia RN  Infection Prevention:   hand hygiene promoted   rest/sleep promoted  Taken 8/27/2024 0015 by Pedro Valencia RN  Infection Management: aseptic technique maintained  Infection Prevention:   hand hygiene promoted   rest/sleep promoted  Taken 8/26/2024 2212 by Pedro Valencia RN  Infection Prevention:   hand hygiene promoted   rest/sleep promoted  Taken 8/26/2024 2035 by Pedro Valencia RN  Infection Prevention:   hand hygiene promoted   rest/sleep promoted  Intervention: Promote Recovery  Recent Flowsheet Documentation  Taken 8/27/2024 0015 by Pedro Valencia RN  Activity Management: bedrest  Taken 8/26/2024 2035 by Pedro Valencia RN  Activity Management: bedrest

## 2024-08-27 NOTE — PROGRESS NOTES
.[facility] Clinical Pharmacy Services: Warfarin Dosing/Monitoring Consult    Caitlyn Longoria is a 89 y.o. female, estimated creatinine clearance is 46.1 mL/min (by C-G formula based on SCr of 0.83 mg/dL). weighing 80.5 kg (177 lb 6.4 oz).    Results from last 7 days   Lab Units 08/27/24  0540 08/26/24  1139 08/25/24  0655 08/24/24  0537 08/23/24  0614   INR  2.70* 2.27* 1.98* 1.94* 2.02*   HEMOGLOBIN g/dL 11.0* 14.1 11.5* 11.2* 12.1   HEMATOCRIT % 35.1 43.8 36.9 35.3 38.0   PLATELETS 10*3/mm3 144 141 115* 110* 114*     Prior to admission anticoagulation: warfarin 2 mg Tue, Thurs; 4 mg all other days    Hospital Anticoagulation:  Consulting provider: Giancarlo  Start date: pta  Indication: A Fib - requiring full anticoagulation  Target INR: 2 - 3  Expected duration: tbd   Bridge Therapy: No      Potential food or drug interactions: none at this time    Education complete?/Date: N/A; home medication    Assessment/Plan:  Dose: INR is therapeutic at 2.70 - will continue home regimen with 2 mg given tonight.  Monitor for any signs or symptoms of bleeding  Follow up daily INRs and dose adjustments    Pharmacy will continue to follow until discharge or discontinuation of warfarin.     Ivana Brownlee, PharmD  Clinical Pharmacist

## 2024-08-27 NOTE — PLAN OF CARE
Goal Outcome Evaluation:     Patient A&Ox4. Tolerated sitting in chair for one hour. Using BSC with x1 assist - incontinent of bladder at times. Plan of care ongoing.

## 2024-08-27 NOTE — PROGRESS NOTES
[facility] Clinical Pharmacy Services: Vancomycin Pharmacokinetic Initial Consult Note    Caitlyn Longoria is a 89 y.o. female who is on day 1 of pharmacy to dose vancomycin.    Indication: Skin and Soft Tissue  Consulting Provider: Giancarlo  Planned Duration of Therapy: 7 days  Loading Dose Ordered or Given: 1500 mg on 8/26 at 2200  MRSA PCR performed: no  Culture/Source:   8/26 BloodCx - pending x2  Target: Dose by Levels  Pertinent Vanc Dosing History: n/a  Other Antimicrobials: cefepime    Vitals/Labs  Ht:  ; Wt: 80.5 kg (177 lb 6.4 oz)  Temp Readings from Last 1 Encounters:   08/26/24 98.2 °F (36.8 °C) (Oral)    Estimated Creatinine Clearance: 38.3 mL/min (by C-G formula based on SCr of 1 mg/dL).  CKD?     Results from last 7 days   Lab Units 08/26/24  1139 08/25/24  0655 08/24/24  0537   CREATININE mg/dL 1.00 0.79 0.77   WBC 10*3/mm3 18.79* 15.65* 16.83*     Assessment/Plan:    Vancomycin Dose: no further dose needed tonight    Vanc Random has been ordered for 8/27 at 0600 with AM labs     Pharmacy will follow patient's kidney function and will adjust doses and obtain levels as necessary. Thank you for involving pharmacy in this patient's care. Please contact pharmacy with any questions or concerns.                           Bella Wright [facility]  Clinical Pharmacist

## 2024-08-27 NOTE — PROGRESS NOTES
Name: Caitlyn Longoria ADMIT: 2024   : 1934  PCP: Zenaida Suarez MD    MRN: [id number] LOS: 0 days   AGE/SEX: 89 y.o. female  ROOM: [facility]     Subjective   Subjective   No acute events. No new complaints. Patient overall feels better. No family at bedside.    Objective   Objective   Vital Signs  Temp:  [98.2 °F (36.8 °C)-98.4 °F (36.9 °C)] 98.4 °F (36.9 °C)  Heart Rate:  [79-82] 80  Resp:  [18-22] 18  BP: (105-128)/(52-69) 128/69  SpO2:  [90 %-100 %] 97 %  on   ;   Device (Oxygen Therapy): room air  Body mass index is 31.42 kg/m².  Physical Exam  Vitals and nursing note reviewed.   Constitutional:       General: She is not in acute distress.     Appearance: She is ill-appearing (chronically). She is not toxic-appearing.   HENT:      Head: Normocephalic and atraumatic.      Nose: Nose normal.      Mouth/Throat:      Mouth: Mucous membranes are moist.      Pharynx: Oropharynx is clear.   Eyes:      Conjunctiva/sclera: Conjunctivae normal.      Pupils: Pupils are equal, round, and reactive to light.   Cardiovascular:      Rate and Rhythm: Normal rate and regular rhythm.      Pulses: Normal pulses.   Pulmonary:      Effort: Pulmonary effort is normal.      Breath sounds: Normal breath sounds. No wheezing or rales.   Abdominal:      General: Bowel sounds are normal. There is no distension.      Palpations: Abdomen is soft.      Tenderness: There is no abdominal tenderness.   Musculoskeletal:         General: Swelling (trace RLE, 2+ LLE) present.      Cervical back: Neck supple.   Skin:     General: Skin is warm and dry.      Capillary Refill: Capillary refill takes less than 2 seconds.      Findings: Erythema (LLE, improving) present.   Neurological:      General: No focal deficit present.      Mental Status: She is alert and oriented to person, place, and time.   Psychiatric:         Mood and Affect: Mood normal.         Behavior: Behavior normal.       Results Review     I reviewed the  patient's new clinical results.  Results from last 7 days   Lab Units 08/27/24  0540 08/26/24  1139 08/25/24  0655 08/24/24  0537   WBC 10*3/mm3 17.29* 18.79* 15.65* 16.83*   HEMOGLOBIN g/dL 11.0* 14.1 11.5* 11.2*   PLATELETS 10*3/mm3 144 141 115* 110*     Results from last 7 days   Lab Units 08/27/24  0540 08/26/24  1139 08/25/24  0655 08/24/24  2155 08/24/24  0537   SODIUM mmol/L 136 139 137  --  137   POTASSIUM mmol/L 3.7 4.1 4.5 4.5 3.6   CHLORIDE mmol/L 102 98 102  --  101   CO2 mmol/L 23.1 27.4 27.0  --  26.9   BUN mg/dL 16 19 19  --  19   CREATININE mg/dL 0.83 1.00 0.79  --  0.77   GLUCOSE mg/dL 80 99 108*  --  97   EGFR mL/min/1.73 67.5 54.0* 71.6  --  73.8     Results from last 7 days   Lab Units 08/26/24  1139 08/24/24  0537 08/23/24  0614 08/22/24  1146   ALBUMIN g/dL 4.1 3.2* 3.4* 3.6   BILIRUBIN mg/dL 2.5* 1.4* 1.4* 1.7*   ALK PHOS U/L 218* 90 91 82   AST (SGOT) U/L 217* 22 22 26   ALT (SGPT) U/L 175* 15 15 20     Results from last 7 days   Lab Units 08/27/24  0540 08/26/24  1139 08/25/24  0655 08/24/24  0537 08/23/24  0614 08/22/24  1419 08/22/24  1146   CALCIUM mg/dL 8.5* 10.3 8.9 8.5* 8.8 7.7* 8.7   ALBUMIN g/dL  --  4.1  --  3.2* 3.4*  --  3.6   MAGNESIUM mg/dL  --   --  2.1  --  2.4  --  1.5*   PHOSPHORUS mg/dL  --   --  3.3  --  2.3* 2.3*  --      Results from last 7 days   Lab Units 08/26/24  1139 08/22/24  1146   PROCALCITONIN ng/mL  --  4.86*   LACTATE mmol/L 1.6  --      Glucose   Date/Time Value Ref Range Status   08/25/2024 1121 228 (H) 70 - 130 mg/dL Final   08/25/2024 0617 102 70 - 130 mg/dL Final   08/24/2024 2006 188 (H) 70 - 130 mg/dL Final       No radiology results for the last day    I have personally reviewed all medications:  Scheduled Medications  albuterol, 2.5 mg, Nebulization, Q4H  budesonide, 0.5 mg, Nebulization, BID  carvedilol, 3.125 mg, Oral, BID  cefepime, 1,000 mg, Intravenous, Q12H  cetirizine, 10 mg, Oral, Nightly  fluticasone, 1 spray, Nasal, Daily  furosemide, 20 mg,  Oral, Daily  montelukast, 10 mg, Oral, Nightly  oxybutynin XL, 10 mg, Oral, BID  rosuvastatin, 20 mg, Oral, Nightly  sodium chloride, 10 mL, Intravenous, Q12H  vancomycin, 1,000 mg, Intravenous, Q24H  warfarin, 2 mg, Oral, Once per day on Tuesday Thursday  warfarin, 4 mg, Oral, Once per day on Sunday Monday Wednesday Friday Saturday    Infusions  Pharmacy to dose vancomycin,   Pharmacy to dose warfarin,     Diet  Diet: Cardiac, Diabetic; Healthy Heart (2-3 Na+); Consistent Carbohydrate; Fluid Consistency: Thin (IDDSI 0)    I have personally reviewed:  [x]  Laboratory   [x]  Microbiology   []  Radiology   [x]  EKG/Telemetry  []  Cardiology/Vascular   []  Pathology    []  Records    Assessment/Plan     Active Hospital Problems    Diagnosis  POA    **Cellulitis of left lower leg [L03.116]  Yes    COPD (chronic obstructive pulmonary disease) [J44.9]  Yes    Anticoagulated on Coumadin [Z79.01]  Not Applicable    CLL (chronic lymphocytic leukemia) [C91.10]  Yes    Morbid obesity [E66.01]  Yes    Chronic combined systolic (congestive) and diastolic (congestive) heart failure [I50.42]  Yes    HTN (hypertension) [I10]  Yes    Coronary artery disease [I25.10]  Yes    HLD (hyperlipidemia) [E78.5]  Yes    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   LLE Cellulitis  - in a diabetic patient with hypogammaglobulinemia, improving  - continue IV vancomycin and cefepime   - blood cultures NGTD  - venous doppler showed no DVT     HTN/CAD/PAF/Chronic Combined Systolic and Diastolic CHF  - BP, HR, and volume states acceptable, no anginal symptoms  - continue coreg and lasix  - on AC with warfarin, pharmacy dosing     Type 2 DM  - BG acceptable  - holding metformin and glipizide  - continue coverage with ssi/hypoglycemia protocol     COPD  - no exacerbation  - continue current  regimen    CLL  - has chronic leukocytosis usually 14-18k    Warfarin (home med) for DVT prophylaxis.  Full code.  Discussed with patient and nursing staff.  Anticipate discharge home in 1-2 days.  Expected Discharge Date: 8/28/2024; Expected Discharge Time:       Sergey Mondragon MD  [facility]  08/27/24  18:12 EDT    Portions of this text have been copied and I have reviewed them. They are accurate as of 8/27/2024

## 2024-08-27 NOTE — THERAPY EVALUATION
Patient Name: Caitlyn Longoria  : 1934    MRN: [id number]                              Today's Date: 2024       Admit Date: 2024    Visit Dx:     ICD-10-CM ICD-9-CM   1. Cellulitis of left leg  L03.116 682.6   2. Chronic anticoagulation  Z79.01 V58.61     Patient Active Problem List   Diagnosis    Neck and shoulder pain    Arthropathy of shoulder region    Carpal tunnel syndrome of left wrist    Chronic pain of both shoulders    Chronic left shoulder pain    Arthropathy of left shoulder    Dysarthria    Type 2 diabetes mellitus with atherosclerosis of aorta    Paroxysmal atrial fibrillation    HLD (hyperlipidemia)    Chronic bronchitis    Coronary artery disease    CVA (cerebral vascular accident)    HTN (hypertension)    CKD (chronic kidney disease), stage III    Chronic combined systolic (congestive) and diastolic (congestive) heart failure    Infection of prosthetic right knee joint    Stasis dermatitis of right lower extremity due to peripheral venous hypertension    Current use of long term anticoagulation    Morbid obesity    Acute respiratory failure with hypoxia    Rhinovirus    Asthma with acute exacerbation    Acute respiratory failure with hypoxemia    Viral pneumonia    Hypoxia    CLL (chronic lymphocytic leukemia)    Dyspnea    Anticoagulated on Coumadin    Osteoporotic compression fracture of spine    Pneumonia due to gram-negative bacteria    Pneumonia due to infectious organism    Bilateral carotid artery disease    Hypogammaglobulinemia    Hypogammaglobulinemia    Cellulitis of right lower extremity    Hypokalemia    Nocturnal hypoxia    COPD (chronic obstructive pulmonary disease)    Upper respiratory tract infection    COPD (chronic obstructive pulmonary disease)    Sick sinus syndrome    Anemia    Thrombocytopenia    Chronic HFrEF (heart failure with reduced ejection fraction)    Cellulitis of left lower leg     Past Medical History:   Diagnosis Date    Aortic calcification      mild, on echo 12/17/2017    Aortic regurgitation     Trace    Asthma     Atrial fibrillation     CAD (coronary artery disease)     Carpal tunnel syndrome of left wrist     Chronic combined systolic and diastolic congestive heart failure     CKD (chronic kidney disease) stage 3, GFR 30-59 ml/min     COPD (chronic obstructive pulmonary disease)     Coronary artery disease involving native coronary artery of native heart with angina pectoris     Disc degeneration, lumbar     Diverticulosis     DM type 2 (diabetes mellitus, type 2)     GERD (gastroesophageal reflux disease)     History of aneurysm     right femoral artery s/p LHC    History of blood transfusion     History of fracture     History of heart attack     History of vitamin D deficiency     Hyperlipidemia     Hypertension     Leukemia     Mild mitral regurgitation     Mitral annular calcification     12/8/2017- echo, moderate    Osteopenia     PAF (paroxysmal atrial fibrillation)     Peripheral neuropathy     Skin cancer     Left hand    Sleep apnea     bipap    SSS (sick sinus syndrome)     Stroke (cerebrum)     TIA (transient ischemic attack) 2017    Tricuspid regurgitation     Trace     Past Surgical History:   Procedure Laterality Date    BRONCHOSCOPY Bilateral 10/6/2020    Procedure: BRONCHOSCOPY with BILATERAL LUNG washings;  Surgeon: Juno Coburn MD;  Location: [facility] ENDOSCOPY;  Service: Pulmonary;  Laterality: Bilateral;  PRE: purulent bronchitis  POST: PURULENT BRONCHITIS    BRONCHOSCOPY Bilateral 10/9/2020    Procedure: BRONCHOSCOPY with washing;  Surgeon: Juno Coburn MD;  Location: [facility] ENDOSCOPY;  Service: Pulmonary;  Laterality: Bilateral;  pre/post - mucous plug      CARDIAC CATHETERIZATION      CARDIAC ELECTROPHYSIOLOGY PROCEDURE N/A 2/7/2020    Procedure: PPM generator change - dual  medtronic;  Surgeon: Kiel Field MD;  Location: [facility] CATH INVASIVE LOCATION;  Service: Cardiology;  Laterality: N/A;    CHOLECYSTECTOMY       CORONARY STENT PLACEMENT      ENDOSCOPY N/A 9/14/2022    Procedure: ESOPHAGOGASTRODUODENOSCOPY with 54fr main dilatation;  Surgeon: Enio Cota MD;  Location: [facility] ENDOSCOPY;  Service: Gastroenterology;  Laterality: N/A;  pre - dysphagia  post - s/p dilatation, watermelon stomach    HERNIA REPAIR      hital hernia    HYSTERECTOMY      PACEMAKER IMPLANTATION      REPLACEMENT TOTAL KNEE Bilateral       General Information       Row Name 08/27/24 1203          OT Time and Intention    Document Type evaluation  -PP     Mode of Treatment occupational therapy;individual therapy  -PP       Row Name 08/27/24 1203          General Information    Patient Profile Reviewed yes  -PP     Prior Level of Function independent:  -PP     Existing Precautions/Restrictions fall  -PP     Barriers to Rehab medically complex;previous functional deficit  -PP       Row Name 08/27/24 1203          Living Environment    People in Home facility resident;alone  long-term  -PP       Row Name 08/27/24 1203          Cognition    Orientation Status (Cognition) oriented x 4  -PP       Row Name 08/27/24 1203          Safety Issues, Functional Mobility    Impairments Affecting Function (Mobility) endurance/activity tolerance;strength;shortness of breath  -PP     Comment, Safety Issues/Impairments (Mobility) gait belt and non skid socks worn for safety  -PP               User Key  (r) = Recorded By, (t) = Taken By, (c) = Cosigned By      Initials Name Provider Type    PP Pinky Petit OT Occupational Therapist                     Mobility/ADL's       Row Name 08/27/24 1208          Bed Mobility    Bed Mobility supine-sit  -PP     Supine-Sit [place] (Bed Mobility) minimum assist (75% patient effort)  -PP     Comment, (Bed Mobility) Pt able to use UE to pull sefl up  -PP       Row Name 08/27/24 1208          Transfers    Transfers sit-stand transfer;stand-sit transfer;bed-chair transfer  -PP       Row Name 08/27/24 1208          Bed-Chair  Transfer    Bed-Chair [place] (Transfers) contact guard  -PP     Assistive Device (Bed-Chair Transfers) walker, front-wheeled  -PP       Row Name 08/27/24 1208          Sit-Stand Transfer    Sit-Stand [place] (Transfers) contact guard  -PP     Assistive Device (Sit-Stand Transfers) walker, 4-wheeled  -PP     Comment, (Sit-Stand Transfer) STS from EOB 1x and BSC 1x, pt slow to rise  -PP       Row Name 08/27/24 1208          Toilet Transfer    Type (Toilet Transfer) sit-stand;stand-sit  -PP     [place] Level (Toilet Transfer) contact guard  -PP     Assistive Device (Toilet Transfer) commode, bedside without drop arms;walker, front-wheeled  -PP       Row Name 08/27/24 1208          Functional Mobility    Functional Mobility- Ind. Level contact guard assist  -PP     Functional Mobility- Device walker, front-wheeled  -PP     Functional Mobility- Comment from bed to/from BSC and chair  -PP       Row Name 08/27/24 1208          Activities of Daily Living    BADL Assessment/Intervention lower body dressing;upper body dressing;toileting;feeding;grooming  -PP       Row Name 08/27/24 1208          Lower Body Dressing Assessment/Training    [place] Level (Lower Body Dressing) don;doff;socks;dependent (less than 25% patient effort)  -PP     Position (Lower Body Dressing) edge of bed sitting  -PP       Row Name 08/27/24 1208          Upper Body Dressing Assessment/Training    [place] Level (Upper Body Dressing) doff;don;front opening garment;moderate assist (50% patient effort)  -PP     Position (Upper Body Dressing) edge of bed sitting  -PP       Row Name 08/27/24 1208          Grooming Assessment/Training    [place] Level (Grooming) grooming skills;set up  -PP       Row Name 08/27/24 1208          Toileting Assessment/Training    [place] Level (Toileting) adjust/manage clothing;perform perineal hygiene;maximum assist (25% patient effort)  -PP     Assistive Devices (Toileting) commode, bedside  without drop arms  -PP     Position (Toileting) edge of bed sitting  -PP     Comment, (Toileting) Pt reports being continent, so purewick was removed during end of session and pt used BSC. However, pt noted to urinate urgently w/o warning using purewick following STS at beginning of session.  -PP       Row Name 08/27/24 1208          Self-Feeding Assessment/Training    [place] Level (Feeding) feeding skills;independent  -PP               User Key  (r) = Recorded By, (t) = Taken By, (c) = Cosigned By      Initials Name Provider Type    PP Pinky Petit, OT Occupational Therapist                   Obj/Interventions       Row Name 08/27/24 1221          Sensory Assessment (Somatosensory)    Sensory Assessment (Somatosensory) UE sensation intact  -PP       Row Name 08/27/24 1221          Vision Assessment/Intervention    Visual Impairment/Limitations WFL  -PP       Row Name 08/27/24 1221          Range of Motion Comprehensive    General Range of Motion bilateral upper extremity ROM WFL  -PP       Row Name 08/27/24 1221          Strength Comprehensive (MMT)    General Manual Muscle Testing (MMT) Assessment other (see comments)  -PP     Comment, General Manual Muscle Testing (MMT) Assessment BUE grossly 3+/5, gen weakness noted  -PP       Row Name 08/27/24 1221          Motor Skills    Motor Skills functional endurance  -PP     Functional Endurance poor  -PP       Row Name 08/27/24 1221          Balance    Static Sitting Balance supervision  -PP     Dynamic Sitting Balance supervision  -PP     Position, Sitting Balance sitting edge of bed  -PP     Static Standing Balance supervision  -PP     Dynamic Standing Balance contact guard  -PP     Balance Interventions sitting;standing;occupation based/functional task  -PP               User Key  (r) = Recorded By, (t) = Taken By, (c) = Cosigned By      Initials Name Provider Type    PP Pinky Petit, OT Occupational Therapist                   Goals/Plan       Row  Name 08/27/24 1224          Bed Mobility Goal 1 (OT)    Activity/Assistive Device (Bed Mobility Goal 1, OT) bed mobility activities, all  -PP     [place] Level/Cues Needed (Bed Mobility Goal 1, OT) modified independence  -PP     Time Frame (Bed Mobility Goal 1, OT) short term goal (STG);2 weeks  -PP       Row Name 08/27/24 1224          Transfer Goal 1 (OT)    Activity/Assistive Device (Transfer Goal 1, OT) transfers, all  -PP     [place] Level/Cues Needed (Transfer Goal 1, OT) modified independence  -PP     Time Frame (Transfer Goal 1, OT) short term goal (STG);2 weeks  -PP     Progress/Outcome (Transfer Goal 1, OT) new goal  -PP       Row Name 08/27/24 1224          Dressing Goal 1 (OT)    Activity/Device (Dressing Goal 1, OT) dressing skills, all;upper body dressing;lower body dressing  -PP     [place]/Cues Needed (Dressing Goal 1, OT) modified independence  -PP     Time Frame (Dressing Goal 1, OT) short term goal (STG);2 weeks  -PP     Progress/Outcome (Dressing Goal 1, OT) new goal  -PP       Row Name 08/27/24 1224          Toileting Goal 1 (OT)    Activity/Device (Toileting Goal 1, OT) toileting skills, all  -PP     [place] Level/Cues Needed (Toileting Goal 1, OT) modified independence  -PP     Time Frame (Toileting Goal 1, OT) short term goal (STG);2 weeks  -PP     Progress/Outcome (Toileting Goal 1, OT) new goal  -PP       Row Name 08/27/24 1224          Grooming Goal 1 (OT)    Activity/Device (Grooming Goal 1, OT) grooming skills, all  -PP     [place] (Grooming Goal 1, OT) modified independence  -PP     Time Frame (Grooming Goal 1, OT) short term goal (STG);2 weeks  -PP     Progress/Outcome (Grooming Goal 1, OT) new goal  -PP       Row Name 08/27/24 1224          Therapy Assessment/Plan (OT)    Planned Therapy Interventions (OT) activity tolerance training;BADL retraining;functional balance retraining;occupation/activity based interventions;patient/caregiver  education/training;strengthening exercise;transfer/mobility retraining  -PP               User Key  (r) = Recorded By, (t) = Taken By, (c) = Cosigned By      Initials Name Provider Type    PP Pinky Petit, AVANI Occupational Therapist                   Clinical Impression       Row Name 08/27/24 1222          Pain Assessment    Pretreatment Pain Rating 0/10 - no pain  -PP     Posttreatment Pain Rating 0/10 - no pain  -PP     Pre/Posttreatment Pain Comment Pt reports pain in L calf but did not rate and did not limit participation in session  -PP     Pain Intervention(s) Rest;Repositioned  -PP       Row Name 08/27/24 1222          Plan of Care Review    Plan of Care Reviewed With patient  -PP     Progress no change  -PP     Outcome Evaluation Pt is a 88 y/o female admitted to [facility] for LLE cellulitis. Pt PMHx includes CHF, DM type 2, CHF, paroxysmal A fib on warfin anticoagulant and CKD. She lives alone in [facility] and uses rollator at [place]. Today pt is A&O x4 and agreeable. She was Min A for supine to sit and Cga for STS from EOB using Rwx. She was Cga/ Rwx for fxnl mob to/from bed> BSC > chair. She presents w/ decreased endurance/ act hu and SOB during session. Pt also reports being continent, so purewick was removed during end of session and pt used BSC w/ Mod A for hygiene. However, at the beginning of the session, pt was noted to urinate urgently w/o warning using purewick. Pt's BUE ROM WFL but some decreased strength noted. Pt may benefit from acute skilled OT to address fxnl deficits in order to improve to PLOF. OT will continue to follow.  -PP       Row Name 08/27/24 1222          Therapy Assessment/Plan (OT)    Rehab Potential (OT) good, to achieve stated therapy goals  -PP     Criteria for Skilled Therapeutic Interventions Met (OT) yes;skilled treatment is necessary  -PP     Therapy Frequency (OT) 5 times/wk  -PP       Row Name 08/27/24 1222          Therapy Plan Review/Discharge Plan (OT)    Anticipated  Discharge Disposition (OT) home;home with assist  -PP       Row Name 08/27/24 1222          Vital Signs    O2 Delivery Pre Treatment room air  -PP     Pre Patient Position Supine  -PP     Intra Patient Position Standing  -PP     Post Patient Position Sitting  -PP       Row Name 08/27/24 1222          Positioning and Restraints    Pre-Treatment Position in bed  -PP     Post Treatment Position chair  -PP     In Chair notified nsg;reclined;call light within reach;encouraged to call for assist;exit alarm on;LLE elevated  -PP               User Key  (r) = Recorded By, (t) = Taken By, (c) = Cosigned By      Initials Name Provider Type    PP Pinky Petit, AVANI Occupational Therapist                   Outcome Measures       Row Name 08/27/24 1224          How much help from another is currently needed...    Putting on and taking off regular lower body clothing? 1  -PP     Bathing (including washing, rinsing, and drying) 2  -PP     Toileting (which includes using toilet bed pan or urinal) 2  -PP     Putting on and taking off regular upper body clothing 3  -PP     Taking care of personal grooming (such as brushing teeth) 3  -PP     Eating meals 4  -PP     AM-PAC 6 Clicks Score (OT) 15  -PP       Row Name 08/27/24 1156          How much help from another person do you currently need...    Turning from your back to your side while in flat bed without using bedrails? 3  -PC     Moving from lying on back to sitting on the side of a flat bed without bedrails? 3  -PC     Moving to and from a bed to a chair (including a wheelchair)? 3  -PC     Standing up from a chair using your arms (e.g., wheelchair, bedside chair)? 3  -PC     Climbing 3-5 steps with a railing? 2  -PC     To walk in hospital room? 3  -PC     AM-PAC 6 Clicks Score (PT) 17  -PC     Highest Level of Mobility Goal 5 --> Static standing  -PC       Row Name 08/27/24 1224 08/27/24 1156       Functional Assessment    Outcome Measure Options AM-PAC 6 Clicks Daily  Activity (OT)  -PP AM-PAC 6 Clicks Basic Mobility (PT)  -PC              User Key  (r) = Recorded By, (t) = Taken By, (c) = Cosigned By      Initials Name Provider Type    Deepa Zhong PT Physical Therapist    Pinky Vick OT Occupational Therapist                      OT Recommendation and Plan  Planned Therapy Interventions (OT): activity tolerance training, BADL retraining, functional balance retraining, occupation/activity based interventions, patient/caregiver education/training, strengthening exercise, transfer/mobility retraining  Therapy Frequency (OT): 5 times/wk  Plan of Care Review  Plan of Care Reviewed With: patient  Progress: no change  Outcome Evaluation: Pt is a 88 y/o female admitted to [facility] for LLE cellulitis. Pt PMHx includes CHF, DM type 2, CHF, paroxysmal A fib on warfin anticoagulant and CKD. She lives alone in [facility] and uses rollator at [place]. Today pt is A&O x4 and agreeable. She was Min A for supine to sit and Cga for STS from EOB using Rwx. She was Cga/ Rwx for fxnl mob to/from bed> BSC > chair. She presents w/ decreased endurance/ act hu and SOB during session. Pt also reports being continent, so purewick was removed during end of session and pt used BSC w/ Mod A for hygiene. However, at the beginning of the session, pt was noted to urinate urgently w/o warning using purewick. Pt's BUE ROM WFL but some decreased strength noted. Pt may benefit from acute skilled OT to address fxnl deficits in order to improve to PLOF. OT will continue to follow.     Time Calculation:   Evaluation Complexity (OT)  Review Occupational Profile/Medical/Therapy History Complexity: expanded/moderate complexity  Assessment, Occupational Performance/Identification of Deficit Complexity: 3-5 performance deficits  Clinical Decision Making Complexity (OT): detailed assessment/moderate complexity  Overall Complexity of Evaluation (OT): moderate complexity     Time Calculation- OT       Row Name 08/27/24  1202             Time Calculation- OT    OT Start Time 1044  -PP      OT Stop Time 1111  -PP      OT Time Calculation (min) 27 min  -PP      Total Timed Code Minutes- OT 17 minute(s)  -PP      OT Received On 08/27/24  -PP      OT - Next Appointment 08/28/24  -PP      OT Goal Re-Cert Due Date 09/10/24  -PP         Timed Charges    71583 - OT Self Care/Mgmt Minutes 17  -PP         Untimed Charges    OT Eval/Re-eval Minutes 10  -PP         Total Minutes    Timed Charges Total Minutes 17  -PP      Untimed Charges Total Minutes 10  -PP       Total Minutes 27  -PP                User Key  (r) = Recorded By, (t) = Taken By, (c) = Cosigned By      Initials Name Provider Type    PP Pinky Petit, OT Occupational Therapist                  Therapy Charges for Today       Code Description Service Date Service Provider Modifiers Qty    58481588832 HC OT SELF CARE/MGMT/TRAIN EA 15 MIN 8/27/2024 Pinky Petit, OT GO 1    70172574880 HC OT EVAL MOD COMPLEXITY 2 8/27/2024 Pinky Petit, OT GO 1                 Porzaki Petit, OT  8/27/2024

## 2024-08-28 ENCOUNTER — TELEPHONE (OUTPATIENT)
Dept: ONCOLOGY | Facility: CLINIC | Age: 89
End: 2024-08-28
Payer: MEDICARE

## 2024-08-28 LAB
ANION GAP SERPL CALCULATED.3IONS-SCNC: 11 MMOL/L (ref 5–15)
BASOPHILS # BLD AUTO: 0.01 10*3/MM3 (ref 0–0.2)
BASOPHILS NFR BLD AUTO: 0.1 % (ref 0–1.5)
BUN SERPL-MCNC: 13 MG/DL (ref 8–23)
BUN/CREAT SERPL: 17.6 (ref 7–25)
CALCIUM SPEC-SCNC: 8 MG/DL (ref 8.6–10.5)
CHLORIDE SERPL-SCNC: 103 MMOL/L (ref 98–107)
CO2 SERPL-SCNC: 23 MMOL/L (ref 22–29)
CREAT SERPL-MCNC: 0.74 MG/DL (ref 0.57–1)
DEPRECATED RDW RBC AUTO: 45.5 FL (ref 37–54)
EGFRCR SERPLBLD CKD-EPI 2021: 77.4 ML/MIN/1.73
EOSINOPHIL # BLD AUTO: 0 10*3/MM3 (ref 0–0.4)
EOSINOPHIL NFR BLD AUTO: 0 % (ref 0.3–6.2)
ERYTHROCYTE [DISTWIDTH] IN BLOOD BY AUTOMATED COUNT: 12.6 % (ref 12.3–15.4)
GLUCOSE SERPL-MCNC: 132 MG/DL (ref 65–99)
HCT VFR BLD AUTO: 35.8 % (ref 34–46.6)
HGB BLD-MCNC: 11.2 G/DL (ref 12–15.9)
IMM GRANULOCYTES # BLD AUTO: 0.05 10*3/MM3 (ref 0–0.05)
IMM GRANULOCYTES NFR BLD AUTO: 0.3 % (ref 0–0.5)
INR PPP: 2.6 (ref 0.9–1.1)
LYMPHOCYTES # BLD AUTO: 11.7 10*3/MM3 (ref 0.7–3.1)
LYMPHOCYTES NFR BLD AUTO: 76.5 % (ref 19.6–45.3)
MCH RBC QN AUTO: 31.3 PG (ref 26.6–33)
MCHC RBC AUTO-ENTMCNC: 31.3 G/DL (ref 31.5–35.7)
MCV RBC AUTO: 100 FL (ref 79–97)
MONOCYTES # BLD AUTO: 0.1 10*3/MM3 (ref 0.1–0.9)
MONOCYTES NFR BLD AUTO: 0.7 % (ref 5–12)
NEUTROPHILS NFR BLD AUTO: 22.4 % (ref 42.7–76)
NEUTROPHILS NFR BLD AUTO: 3.44 10*3/MM3 (ref 1.7–7)
PLATELET # BLD AUTO: 155 10*3/MM3 (ref 140–450)
PMV BLD AUTO: 9.8 FL (ref 6–12)
POTASSIUM SERPL-SCNC: 3.3 MMOL/L (ref 3.5–5.2)
POTASSIUM SERPL-SCNC: 4.1 MMOL/L (ref 3.5–5.2)
PROTHROMBIN TIME: 28.1 SECONDS (ref 11.7–14.2)
RBC # BLD AUTO: 3.58 10*6/MM3 (ref 3.77–5.28)
SODIUM SERPL-SCNC: 137 MMOL/L (ref 136–145)
WBC NRBC COR # BLD AUTO: 15.3 10*3/MM3 (ref 3.4–10.8)

## 2024-08-28 PROCEDURE — 94761 N-INVAS EAR/PLS OXIMETRY MLT: CPT

## 2024-08-28 PROCEDURE — 94760 N-INVAS EAR/PLS OXIMETRY 1: CPT

## 2024-08-28 PROCEDURE — 94799 UNLISTED PULMONARY SVC/PX: CPT

## 2024-08-28 PROCEDURE — 80048 BASIC METABOLIC PNL TOTAL CA: CPT | Performed by: INTERNAL MEDICINE

## 2024-08-28 PROCEDURE — 84132 ASSAY OF SERUM POTASSIUM: CPT | Performed by: INTERNAL MEDICINE

## 2024-08-28 PROCEDURE — 25010000002 VANCOMYCIN 1 G RECONSTITUTED SOLUTION 1 EACH VIAL: Performed by: INTERNAL MEDICINE

## 2024-08-28 PROCEDURE — 25810000003 SODIUM CHLORIDE 0.9 % SOLUTION 250 ML FLEX CONT: Performed by: INTERNAL MEDICINE

## 2024-08-28 PROCEDURE — 94664 DEMO&/EVAL PT USE INHALER: CPT

## 2024-08-28 PROCEDURE — 25010000002 CEFEPIME PER 500 MG: Performed by: INTERNAL MEDICINE

## 2024-08-28 PROCEDURE — 97530 THERAPEUTIC ACTIVITIES: CPT

## 2024-08-28 PROCEDURE — 85610 PROTHROMBIN TIME: CPT | Performed by: INTERNAL MEDICINE

## 2024-08-28 PROCEDURE — 85025 COMPLETE CBC W/AUTO DIFF WBC: CPT | Performed by: INTERNAL MEDICINE

## 2024-08-28 PROCEDURE — 94640 AIRWAY INHALATION TREATMENT: CPT

## 2024-08-28 RX ORDER — SODIUM CHLORIDE FOR INHALATION 7 %
4 VIAL, NEBULIZER (ML) INHALATION
Status: DISCONTINUED | OUTPATIENT
Start: 2024-08-28 | End: 2024-09-03 | Stop reason: HOSPADM

## 2024-08-28 RX ORDER — POTASSIUM CHLORIDE 750 MG/1
40 TABLET, FILM COATED, EXTENDED RELEASE ORAL EVERY 4 HOURS
Status: COMPLETED | OUTPATIENT
Start: 2024-08-28 | End: 2024-08-28

## 2024-08-28 RX ORDER — GUAIFENESIN 200 MG/10ML
200 LIQUID ORAL EVERY 4 HOURS PRN
Status: DISCONTINUED | OUTPATIENT
Start: 2024-08-28 | End: 2024-09-03 | Stop reason: HOSPADM

## 2024-08-28 RX ADMIN — POTASSIUM CHLORIDE 40 MEQ: 750 TABLET, EXTENDED RELEASE ORAL at 14:45

## 2024-08-28 RX ADMIN — ALBUTEROL SULFATE 2.5 MG: 2.5 SOLUTION RESPIRATORY (INHALATION) at 20:48

## 2024-08-28 RX ADMIN — ALBUTEROL SULFATE 2.5 MG: 2.5 SOLUTION RESPIRATORY (INHALATION) at 11:19

## 2024-08-28 RX ADMIN — OXYBUTYNIN CHLORIDE 10 MG: 10 TABLET, EXTENDED RELEASE ORAL at 08:06

## 2024-08-28 RX ADMIN — FLUTICASONE PROPIONATE 1 SPRAY: 50 SPRAY, METERED NASAL at 08:06

## 2024-08-28 RX ADMIN — CEFEPIME 1000 MG: 1 INJECTION, POWDER, FOR SOLUTION INTRAMUSCULAR; INTRAVENOUS at 14:45

## 2024-08-28 RX ADMIN — WARFARIN 4 MG: 4 TABLET ORAL at 17:58

## 2024-08-28 RX ADMIN — Medication 4 ML: at 20:58

## 2024-08-28 RX ADMIN — Medication 4 ML: at 11:23

## 2024-08-28 RX ADMIN — BUDESONIDE 0.5 MG: 0.5 INHALANT RESPIRATORY (INHALATION) at 07:46

## 2024-08-28 RX ADMIN — ROSUVASTATIN CALCIUM 20 MG: 20 TABLET, FILM COATED ORAL at 20:38

## 2024-08-28 RX ADMIN — Medication 10 ML: at 20:38

## 2024-08-28 RX ADMIN — OXYBUTYNIN CHLORIDE 10 MG: 10 TABLET, EXTENDED RELEASE ORAL at 20:38

## 2024-08-28 RX ADMIN — MONTELUKAST SODIUM 10 MG: 10 TABLET, FILM COATED ORAL at 20:38

## 2024-08-28 RX ADMIN — ALBUTEROL SULFATE 2.5 MG: 2.5 SOLUTION RESPIRATORY (INHALATION) at 23:30

## 2024-08-28 RX ADMIN — ALBUTEROL SULFATE 2.5 MG: 2.5 SOLUTION RESPIRATORY (INHALATION) at 07:42

## 2024-08-28 RX ADMIN — FUROSEMIDE 20 MG: 20 TABLET ORAL at 08:06

## 2024-08-28 RX ADMIN — CETIRIZINE HYDROCHLORIDE 10 MG: 10 TABLET ORAL at 20:38

## 2024-08-28 RX ADMIN — VANCOMYCIN HYDROCHLORIDE 1000 MG: 1 INJECTION, POWDER, LYOPHILIZED, FOR SOLUTION INTRAVENOUS at 21:29

## 2024-08-28 RX ADMIN — CEFEPIME 1000 MG: 1 INJECTION, POWDER, FOR SOLUTION INTRAMUSCULAR; INTRAVENOUS at 02:17

## 2024-08-28 RX ADMIN — CARVEDILOL 3.12 MG: 3.12 TABLET, FILM COATED ORAL at 08:06

## 2024-08-28 RX ADMIN — ALBUTEROL SULFATE 2.5 MG: 2.5 SOLUTION RESPIRATORY (INHALATION) at 15:34

## 2024-08-28 RX ADMIN — CARVEDILOL 3.12 MG: 3.12 TABLET, FILM COATED ORAL at 20:38

## 2024-08-28 RX ADMIN — BUDESONIDE 0.5 MG: 0.5 INHALANT RESPIRATORY (INHALATION) at 20:54

## 2024-08-28 RX ADMIN — POTASSIUM CHLORIDE 40 MEQ: 750 TABLET, EXTENDED RELEASE ORAL at 12:25

## 2024-08-28 RX ADMIN — ALBUTEROL SULFATE 2.5 MG: 2.5 SOLUTION RESPIRATORY (INHALATION) at 02:57

## 2024-08-28 NOTE — PLAN OF CARE
Goal Outcome Evaluation:  Plan of Care Reviewed With: patient        Progress: no change  Outcome Evaluation: Pt was seen for PT tx this PM. Pt was in bed and sat up to EOB w/ incr time and CGA/SV. Pt stood w/ CGA. Pt amb approx 40' w/ SBA and use of fww. Pt was slow and antalgic w/ no overt LOB. Pt was UIC w/ RT at end of session. PT will prog as pt hu.      Anticipated Discharge Disposition (PT): assisted living

## 2024-08-28 NOTE — PROGRESS NOTES
Discharge Planning Assessment  [facility]     Patient Name: Caitlyn Longoria  MRN: [id number]  Today's Date: 8/28/2024    Admit Date: 8/26/2024    Plan: [place] - [place] [facility] with [facility] following (referral pending)   Discharge Needs Assessment       Row Name 08/28/24 0955       Living Environment    People in Home alone    Current Living Arrangements assisted living facility    Potentially Unsafe Housing Conditions none    Primary Care Provided by self    Provides Primary Care For no one, unable/limited ability to care for self    Family Caregiver if Needed other relative(s)    Family Caregiver Names Nidanae Adair (HCS -AD in [facility]) [phone] or brother Trae Longoria [phone]    Quality of Family Relationships helpful    Able to Return to Prior Arrangements yes       Resource/Environmental Concerns    Resource/Environmental Concerns none    Transportation Concerns none       Transition Planning    Patient/Family Anticipates Transition to home    Patient/Family Anticipated Services at Transition home health care    Transportation Anticipated family or friend will provide       Discharge Needs Assessment    Readmission Within the Last 30 Days no previous admission in last 30 days    Equipment Currently Used at Home rollator;grab bar;nebulizer;shower chair;oxygen;bipap    Concerns to be Addressed denies needs/concerns at this time    Anticipated Changes Related to Illness none    Equipment Needed After Discharge none    Provided Post Acute Provider List? N/A    N/A Provider List Comment Used [facility] recently and would like to use them again                   Discharge Plan       Row Name 08/28/24 0959       Plan    Plan [place] home - [place] [facility] with [facility] following (referral pending)    Patient/Family in Agreement with Plan yes    Plan Comments Spoke with patient at bedside.  [place] from [place] [facility] in a 3rd floor apartment with an elevator.  She has scales, grab bars in the BR,  shower chair, rollator, nebulizer, Bi-pap and O2 from [place].  She has been to [facility] and [facility] for rehab.  There is transportation available at [facility] or her niece Zoey Adair (HCS - AD in [facility]) [phone] assist her, or her brother Trae [phone].  PCP is Zenaida FOSTER and pharmacy is [facility] [address] [facility] [place], but patient will use [id number].  She is agreeable to referral to [facility]  - has used them in the past, left message for Ivana.  CCP will follow.  Neha PAEZ                  Continued Care and Services - Admitted Since 8/26/2024       Home Medical Care       Service Provider Request Status Selected Services Address Phone Fax Patient Preferred    [facility] Pending - Request Sent N/A [address] [facility], [facility] [address] [id number] [phone] --                  Expected Discharge Date and Time       Expected Discharge Date Expected Discharge Time    Aug 28, 2024            Demographic Summary       Row Name 08/28/24 0954       General Information    Admission Type inpatient    Arrived From home    Referral Source admission list    Reason for Consult discharge planning    Preferred Language English                   Functional Status       Row Name 08/28/24 0955       Functional Status    Usual Activity Tolerance moderate    Current Activity Tolerance fair       Functional Status, IADL    Medications independent    Meal Preparation assistive person    Housekeeping assistive person    Laundry assistive person    Shopping assistive person       Mental Status    General Appearance WDL WDL       Mental Status Summary    Recent Changes in Mental Status/Cognitive Functioning no changes                          Becky S. [place], RN

## 2024-08-28 NOTE — DISCHARGE PLACEMENT REQUEST
"Delfin Longoria (89 y.o. Female)       Date of Birth   1934    Social Security Number       Address   [address] [place] [address]    Home Phone   [phone]    MRN   [id number]       Pentecostalism   Jainism    Marital Status   Single                            Admission Date   8/26/24    Admission Type   Emergency    Admitting Provider   Sergey Mondragon MD    Attending Provider   Sergey Mondragon MD    Department, Room/Bed   [address], [address]       Discharge Date       Discharge Disposition       Discharge Destination                                 Attending Provider: Sergey Mondragon MD    Allergies: Accupril [Quinapril Hcl], Ahist [Chlorpheniramine], Clarithromycin, Esomeprazole, Latex, Levalbuterol, Levocetirizine, Lipitor [Atorvastatin], Omeprazole, Pravachol [Pravastatin], Sulindac, Valdecoxib, Chlorcyclizine, Diclofenac Sodium, Diphenhydramine, Lodine [Etodolac], Sulfa Antibiotics    Isolation: None   Infection: None   Code Status: CPR    Ht: 160 cm (63\")   Wt: 80.5 kg (177 lb 6.4 oz)    Admission Cmt: None   Principal Problem: Cellulitis of left lower leg [L03.116]                   Active Insurance as of 8/26/2024       Primary Coverage       Payor Plan Insurance Group Employer/Plan Group    MEDICARE MEDICARE A & B        Payor Plan Address Payor Plan Phone Number Payor Plan Fax Number Effective Dates    [address] [phone]  10/1/1999 - None Entered    [facility] [id number]         Subscriber Name Subscriber Birth Date Member ID       DELFIN LONGORIA 1934 [id number]               Secondary Coverage       Payor Plan Insurance Group Employer/Plan Group    AARP MC SUP AARP HEALTH CARE OPTIONS PLAN F       Payor Plan Address Payor Plan Phone Number Payor Plan Fax Number Effective Dates    [facility] [phone]  1/1/2015 - None Entered    [address]       [facility] [address]         Subscriber Name Subscriber Birth Date " Member ID       NOA LONGORIAJEFFREY TUCKER 1934 [id number]                     Emergency Contacts        (Rel.) Home Phone Work Phone Mobile Phone    Zoey Adair (HCS) (Relative) [phone] -- [phone]    Trae Longoria (HCS) (Brother) [phone] -- [phone]    Inez Longoria (Relative) [phone] -- [phone]

## 2024-08-28 NOTE — TELEPHONE ENCOUNTER
Caller: Caitlyn Longoria    Relationship: Self    Best call back number: [phone]    What is the best time to reach you: NO NEED    Who are you requesting to speak with (clinical staff, provider,  specific staff member): CLINICAL & SCHEDULING     What was the call regarding: PT WENT TO HOSP. ON 9/22/24 WITH AN UPPER RESPIRATORY INFECTION, WAS D/C ON 4/25/24 & RE-ADMITTED ON 8/26/24 WITH CELLULITIS, FYI.    Is it okay if the provider responds through MyChart: NO          Chief complaint: CANCEL APPTS.     Type of visit: LAB, F/U & INFUSION    Requested date: WILL CALL WHEN SHE GETS HOME & GETS SETTLED TO R/S    When is the original appointment: 8/30/24

## 2024-08-28 NOTE — PLAN OF CARE
Goal Outcome Evaluation:   Pt resting on the bed, alert and oriented, RA/ 1L o2 nc at sleep, c/o mild pain refused pain med, elevated legs in the bed, administered meds per mar, frequent productive cough, notified [facility], will continue to monitor.      Problem: Adult Inpatient Plan of Care  Goal: Absence of Hospital-Acquired Illness or Injury  Intervention: Identify and Manage Fall Risk  Recent Flowsheet Documentation  Taken 8/28/2024 0408 by Pedro Valencia RN  Safety Promotion/Fall Prevention:   activity supervised   room organization consistent   safety round/check completed  Taken 8/28/2024 0208 by Pedro Valencia RN  Safety Promotion/Fall Prevention:   activity supervised   room organization consistent   safety round/check completed  Taken 8/27/2024 2355 by Pedro Valencia RN  Safety Promotion/Fall Prevention:   activity supervised   room organization consistent   safety round/check completed  Taken 8/27/2024 2212 by Pedro Valencia RN  Safety Promotion/Fall Prevention:   activity supervised   room organization consistent   safety round/check completed  Taken 8/27/2024 2020 by Pedro Valencia RN  Safety Promotion/Fall Prevention:   activity supervised   room organization consistent   safety round/check completed  Intervention: Prevent Skin Injury  Recent Flowsheet Documentation  Taken 8/28/2024 0408 by Pedro Valencia RN  Body Position: position changed independently  Taken 8/28/2024 0208 by Pedro Valencia RN  Body Position: position changed independently  Taken 8/27/2024 2355 by Pedro Valencia RN  Body Position: position changed independently  Skin Protection: adhesive use limited  Taken 8/27/2024 2212 by Pedro Valencia RN  Body Position: position changed independently  Taken 8/27/2024 2020 by Pedro Valencia RN  Body Position: position changed independently  Skin Protection: adhesive use limited  Intervention: Prevent and Manage VTE (Venous Thromboembolism) Risk  Recent Flowsheet  Documentation  Taken 8/27/2024 2355 by Pedro Valencia RN  Activity Management: bedrest  Range of Motion: active ROM (range of motion) encouraged  Taken 8/27/2024 2020 by Pedro Valencia RN  Activity Management: bedrest  Range of Motion: active ROM (range of motion) encouraged  Intervention: Prevent Infection  Recent Flowsheet Documentation  Taken 8/28/2024 0408 by Pedro Valencia RN  Infection Prevention:   hand hygiene promoted   personal protective equipment utilized   rest/sleep promoted  Taken 8/28/2024 0208 by Pedro Valencia RN  Infection Prevention:   hand hygiene promoted   rest/sleep promoted  Taken 8/27/2024 2355 by Pedro Valencia RN  Infection Prevention:   hand hygiene promoted   personal protective equipment utilized   rest/sleep promoted  Taken 8/27/2024 2212 by Pedro Valencia RN  Infection Prevention:   hand hygiene promoted   rest/sleep promoted  Taken 8/27/2024 2020 by Pedro Valencia RN  Infection Prevention:   hand hygiene promoted   single patient room provided   rest/sleep promoted  Goal: Optimal Comfort and Wellbeing  Intervention: Provide Person-Centered Care  Recent Flowsheet Documentation  Taken 8/27/2024 2355 by Pedro Valencia RN  Trust Relationship/Rapport:   choices provided   care explained  Taken 8/27/2024 2020 by Pedro Valencia RN  Trust Relationship/Rapport:   care explained   choices provided     Problem: Asthma Comorbidity  Goal: Maintenance of Asthma Control  Intervention: Maintain Asthma Symptom Control  Recent Flowsheet Documentation  Taken 8/28/2024 0408 by Pedro Valencia RN  Medication Review/Management: medications reviewed  Taken 8/28/2024 0208 by Pedro Valencia RN  Medication Review/Management: medications reviewed  Taken 8/27/2024 2355 by Pedro Valencia RN  Medication Review/Management: medications reviewed  Taken 8/27/2024 2212 by Pedro Valencia RN  Medication Review/Management: medications reviewed  Taken 8/27/2024 2020 by  Pedro Valencia RN  Medication Review/Management: medications reviewed     Problem: Behavioral Health Comorbidity  Goal: Maintenance of Behavioral Health Symptom Control  Intervention: Maintain Behavioral Health Symptom Control  Recent Flowsheet Documentation  Taken 8/28/2024 0408 by Pedro Valencia RN  Medication Review/Management: medications reviewed  Taken 8/28/2024 0208 by Pedro Valencia RN  Medication Review/Management: medications reviewed  Taken 8/27/2024 2355 by Pedro Valencia RN  Medication Review/Management: medications reviewed  Taken 8/27/2024 2212 by Pedro Valencia RN  Medication Review/Management: medications reviewed  Taken 8/27/2024 2020 by Pedro Valencia RN  Medication Review/Management: medications reviewed     Problem: COPD (Chronic Obstructive Pulmonary Disease) Comorbidity  Goal: Maintenance of COPD Symptom Control  Intervention: Maintain COPD-Symptom Control  Recent Flowsheet Documentation  Taken 8/28/2024 0408 by Pedro Valencia RN  Medication Review/Management: medications reviewed  Taken 8/28/2024 0208 by Pedro Valencia RN  Medication Review/Management: medications reviewed  Taken 8/27/2024 2355 by Pedro Valencia RN  Medication Review/Management: medications reviewed  Taken 8/27/2024 2212 by Pedro Valencia RN  Medication Review/Management: medications reviewed  Taken 8/27/2024 2020 by Pedro Valencia RN  Medication Review/Management: medications reviewed     Problem: Heart Failure Comorbidity  Goal: Maintenance of Heart Failure Symptom Control  Intervention: Maintain Heart Failure-Management  Recent Flowsheet Documentation  Taken 8/28/2024 0408 by Pedro Valencia RN  Medication Review/Management: medications reviewed  Taken 8/28/2024 0208 by Pedro Valencia RN  Medication Review/Management: medications reviewed  Taken 8/27/2024 2355 by Pedro Valencia RN  Medication Review/Management: medications reviewed  Taken 8/27/2024 2212 by Erik  JEANNINE Vasquez  Medication Review/Management: medications reviewed  Taken 8/27/2024 2020 by Pedro Valencia RN  Medication Review/Management: medications reviewed     Problem: Hypertension Comorbidity  Goal: Blood Pressure in Desired Range  Intervention: Maintain Blood Pressure Management  Recent Flowsheet Documentation  Taken 8/28/2024 0408 by Pedro Valencia RN  Medication Review/Management: medications reviewed  Taken 8/28/2024 0208 by Pedro Valencia RN  Medication Review/Management: medications reviewed  Taken 8/27/2024 2355 by Pedro Valencia RN  Medication Review/Management: medications reviewed  Taken 8/27/2024 2212 by Pedro Valencia RN  Medication Review/Management: medications reviewed  Taken 8/27/2024 2020 by Pedro Valencia RN  Medication Review/Management: medications reviewed     Problem: Osteoarthritis Comorbidity  Goal: Maintenance of Osteoarthritis Symptom Control  Intervention: Maintain Osteoarthritis Symptom Control  Recent Flowsheet Documentation  Taken 8/28/2024 0408 by Pedro Valencia RN  Medication Review/Management: medications reviewed  Taken 8/28/2024 0208 by Pedro Valencia RN  Medication Review/Management: medications reviewed  Taken 8/27/2024 2355 by Pedro Valencia RN  Activity Management: bedrest  Medication Review/Management: medications reviewed  Taken 8/27/2024 2212 by Pedro Valencai RN  Medication Review/Management: medications reviewed  Taken 8/27/2024 2020 by Pedro Valencia RN  Activity Management: bedrest  Medication Review/Management: medications reviewed     Problem: Pain Chronic (Persistent) (Comorbidity Management)  Goal: Acceptable Pain Control and Functional Ability  Intervention: Manage Persistent Pain  Recent Flowsheet Documentation  Taken 8/28/2024 0408 by Pedro Valencia RN  Medication Review/Management: medications reviewed  Taken 8/28/2024 0208 by Pedro Valencia RN  Medication Review/Management: medications reviewed  Taken  8/27/2024 2355 by Pedro Valencia RN  Medication Review/Management: medications reviewed  Taken 8/27/2024 2212 by Pedro Valencia RN  Medication Review/Management: medications reviewed  Taken 8/27/2024 2020 by Pedro Valencia RN  Medication Review/Management: medications reviewed  Intervention: Optimize Psychosocial Wellbeing  Recent Flowsheet Documentation  Taken 8/27/2024 2355 by Pedro Valencia RN  Diversional Activities: television     Problem: Skin Injury Risk Increased  Goal: Skin Health and Integrity  Intervention: Optimize Skin Protection  Recent Flowsheet Documentation  Taken 8/28/2024 0408 by Pedro Valencia RN  Head of Bed (HOB) Positioning: HOB elevated  Taken 8/28/2024 0208 by Pedro Valencia RN  Head of Bed (HOB) Positioning: HOB elevated  Taken 8/27/2024 2355 by Pedro Valencia RN  Pressure Reduction Techniques:   frequent weight shift encouraged   weight shift assistance provided  Head of Bed (HOB) Positioning: HOB elevated  Pressure Reduction Devices:   alternating pressure pump (ADD)   pressure-redistributing mattress utilized  Skin Protection: adhesive use limited  Taken 8/27/2024 2212 by Pedro Valencia RN  Head of Bed (HOB) Positioning: HOB elevated  Taken 8/27/2024 2020 by Pedro Valencia RN  Pressure Reduction Techniques:   frequent weight shift encouraged   weight shift assistance provided  Head of Bed (HOB) Positioning: HOB elevated  Pressure Reduction Devices:   alternating pressure pump (ADD)   pressure-redistributing mattress utilized  Skin Protection: adhesive use limited     Problem: Infection Progression (Sepsis/Septic Shock)  Goal: Absence of Infection Signs and Symptoms  Intervention: Initiate Sepsis Management  Recent Flowsheet Documentation  Taken 8/28/2024 0408 by Pedro Valencia RN  Infection Prevention:   hand hygiene promoted   personal protective equipment utilized   rest/sleep promoted  Taken 8/28/2024 0208 by Pedro Valencia RN  Infection  Prevention:   hand hygiene promoted   rest/sleep promoted  Taken 8/27/2024 2355 by Pedro Valencia RN  Infection Management: aseptic technique maintained  Infection Prevention:   hand hygiene promoted   personal protective equipment utilized   rest/sleep promoted  Taken 8/27/2024 2212 by Pedro Valencia RN  Infection Prevention:   hand hygiene promoted   rest/sleep promoted  Taken 8/27/2024 2020 by Pedro Valencia RN  Infection Management: aseptic technique maintained  Infection Prevention:   hand hygiene promoted   single patient room provided   rest/sleep promoted  Intervention: Promote Recovery  Recent Flowsheet Documentation  Taken 8/27/2024 2355 by Pedro Valencia RN  Activity Management: bedrest  Taken 8/27/2024 2020 by Pedro Valencia RN  Activity Management: bedrest     Problem: Fall Injury Risk  Goal: Absence of Fall and Fall-Related Injury  Intervention: Identify and Manage Contributors  Recent Flowsheet Documentation  Taken 8/28/2024 0408 by Pedro Valencia RN  Medication Review/Management: medications reviewed  Taken 8/28/2024 0208 by Pedro Valencia RN  Medication Review/Management: medications reviewed  Taken 8/27/2024 2355 by Pedro Valencia RN  Medication Review/Management: medications reviewed  Taken 8/27/2024 2212 by Pedro Valencia RN  Medication Review/Management: medications reviewed  Taken 8/27/2024 2020 by Pedro Valencia RN  Medication Review/Management: medications reviewed  Intervention: Promote Injury-Free Environment  Recent Flowsheet Documentation  Taken 8/28/2024 0408 by Pedro Valencia RN  Safety Promotion/Fall Prevention:   activity supervised   room organization consistent   safety round/check completed  Taken 8/28/2024 0208 by Pedro Valencia RN  Safety Promotion/Fall Prevention:   activity supervised   room organization consistent   safety round/check completed  Taken 8/27/2024 2355 by Pedro Valencia RN  Safety Promotion/Fall Prevention:    activity supervised   room organization consistent   safety round/check completed  Taken 8/27/2024 2212 by Pedro Valencia, RN  Safety Promotion/Fall Prevention:   activity supervised   room organization consistent   safety round/check completed  Taken 8/27/2024 2020 by Pedro Valencia, RN  Safety Promotion/Fall Prevention:   activity supervised   room organization consistent   safety round/check completed

## 2024-08-28 NOTE — THERAPY TREATMENT NOTE
Patient Name: Caitlyn Longoria  : 1934    MRN: [id number]                              Today's Date: 2024       Admit Date: 2024    Visit Dx:     ICD-10-CM ICD-9-CM   1. Cellulitis of left leg  L03.116 682.6   2. Chronic anticoagulation  Z79.01 V58.61     Patient Active Problem List   Diagnosis    Neck and shoulder pain    Arthropathy of shoulder region    Carpal tunnel syndrome of left wrist    Chronic pain of both shoulders    Chronic left shoulder pain    Arthropathy of left shoulder    Dysarthria    Type 2 diabetes mellitus with atherosclerosis of aorta    Paroxysmal atrial fibrillation    HLD (hyperlipidemia)    Chronic bronchitis    Coronary artery disease    CVA (cerebral vascular accident)    HTN (hypertension)    CKD (chronic kidney disease), stage III    Chronic combined systolic (congestive) and diastolic (congestive) heart failure    Infection of prosthetic right knee joint    Stasis dermatitis of right lower extremity due to peripheral venous hypertension    Current use of long term anticoagulation    Morbid obesity    Acute respiratory failure with hypoxia    Rhinovirus    Asthma with acute exacerbation    Acute respiratory failure with hypoxemia    Viral pneumonia    Hypoxia    CLL (chronic lymphocytic leukemia)    Dyspnea    Anticoagulated on Coumadin    Osteoporotic compression fracture of spine    Pneumonia due to gram-negative bacteria    Pneumonia due to infectious organism    Bilateral carotid artery disease    Hypogammaglobulinemia    Hypogammaglobulinemia    Cellulitis of right lower extremity    Hypokalemia    Nocturnal hypoxia    COPD (chronic obstructive pulmonary disease)    Upper respiratory tract infection    COPD (chronic obstructive pulmonary disease)    Sick sinus syndrome    Anemia    Thrombocytopenia    Chronic HFrEF (heart failure with reduced ejection fraction)    Cellulitis of left lower leg     Past Medical History:   Diagnosis Date    Aortic calcification      mild, on echo 12/17/2017    Aortic regurgitation     Trace    Asthma     Atrial fibrillation     CAD (coronary artery disease)     Carpal tunnel syndrome of left wrist     Chronic combined systolic and diastolic congestive heart failure     CKD (chronic kidney disease) stage 3, GFR 30-59 ml/min     COPD (chronic obstructive pulmonary disease)     Coronary artery disease involving native coronary artery of native heart with angina pectoris     Disc degeneration, lumbar     Diverticulosis     DM type 2 (diabetes mellitus, type 2)     GERD (gastroesophageal reflux disease)     History of aneurysm     right femoral artery s/p LHC    History of blood transfusion     History of fracture     History of heart attack     History of vitamin D deficiency     Hyperlipidemia     Hypertension     Leukemia     Mild mitral regurgitation     Mitral annular calcification     12/8/2017- echo, moderate    Osteopenia     PAF (paroxysmal atrial fibrillation)     Peripheral neuropathy     Skin cancer     Left hand    Sleep apnea     bipap    SSS (sick sinus syndrome)     Stroke (cerebrum)     TIA (transient ischemic attack) 2017    Tricuspid regurgitation     Trace     Past Surgical History:   Procedure Laterality Date    BRONCHOSCOPY Bilateral 10/6/2020    Procedure: BRONCHOSCOPY with BILATERAL LUNG washings;  Surgeon: Juno Coburn MD;  Location: [facility] ENDOSCOPY;  Service: Pulmonary;  Laterality: Bilateral;  PRE: purulent bronchitis  POST: PURULENT BRONCHITIS    BRONCHOSCOPY Bilateral 10/9/2020    Procedure: BRONCHOSCOPY with washing;  Surgeon: Juno Coburn MD;  Location: [facility] ENDOSCOPY;  Service: Pulmonary;  Laterality: Bilateral;  pre/post - mucous plug      CARDIAC CATHETERIZATION      CARDIAC ELECTROPHYSIOLOGY PROCEDURE N/A 2/7/2020    Procedure: PPM generator change - dual  medtronic;  Surgeon: Kiel Field MD;  Location: [facility] CATH INVASIVE LOCATION;  Service: Cardiology;  Laterality: N/A;    CHOLECYSTECTOMY       CORONARY STENT PLACEMENT      ENDOSCOPY N/A 9/14/2022    Procedure: ESOPHAGOGASTRODUODENOSCOPY with 54fr main dilatation;  Surgeon: Enio Cota MD;  Location: [facility] ENDOSCOPY;  Service: Gastroenterology;  Laterality: N/A;  pre - dysphagia  post - s/p dilatation, watermelon stomach    HERNIA REPAIR      hital hernia    HYSTERECTOMY      PACEMAKER IMPLANTATION      REPLACEMENT TOTAL KNEE Bilateral       General Information       Row Name 08/28/24 1537          Physical Therapy Time and Intention    Document Type therapy note (daily note)  -PH     Mode of Treatment physical therapy  -PH       Row Name 08/28/24 1537          General Information    Existing Precautions/Restrictions fall;oxygen therapy device and L/min  -PH       Row Name 08/28/24 1537          Cognition    Orientation Status (Cognition) oriented x 4  -PH       Row Name 08/28/24 1537          Safety Issues, Functional Mobility    Impairments Affecting Function (Mobility) endurance/activity tolerance;strength;shortness of breath  -PH     Comment, Safety Issues/Impairments (Mobility) gt belt and non skid socks donned  -PH               User Key  (r) = Recorded By, (t) = Taken By, (c) = Cosigned By      Initials Name Provider Type    PH Lisy Bautista PTA Physical Therapist Assistant                   Mobility       Row Name 08/28/24 1537          Bed Mobility    Bed Mobility supine-sit  -PH     Supine-Sit [place] (Bed Mobility) supervision;contact guard;verbal cues  -PH     Assistive Device (Bed Mobility) bed rails;head of bed elevated  -PH     Comment, (Bed Mobility) incr time to EOB; Pt UIC w/ RT at end of session  -PH       Row Name 08/28/24 1537          Sit-Stand Transfer    Sit-Stand [place] (Transfers) contact guard  -PH     Assistive Device (Sit-Stand Transfers) walker, 4-wheeled  -PH       Row Name 08/28/24 1537          Gait/Stairs (Locomotion)    [place] Level (Gait) contact guard;standby assist  -PH     Assistive  Device (Gait) walker, front-wheeled  -PH     Distance in Feet (Gait) 40  -PH     Deviations/Abnormal Patterns (Gait) gait speed decreased;stride length decreased;antalgic  -PH     Bilateral Gait Deviations forward flexed posture;heel strike decreased  -PH     [place] Level (Stairs) unable to assess  -PH     Comment, (Gait/Stairs) slow w/ kyphotic posture exhibited; SOA noted although pt denied feeling SOA  -PH               User Key  (r) = Recorded By, (t) = Taken By, (c) = Cosigned By      Initials Name Provider Type     Lisy Bautista PTA Physical Therapist Assistant                   Obj/Interventions       Row Name 08/28/24 4172          Balance    Balance Assessment sitting static balance;standing static balance  -PH     Static Sitting Balance standby assist  -PH     Static Standing Balance standby assist  -PH     Position/Device Used, Standing Balance walker, front-wheeled  -PH     Comment, Balance fairly steady today  -PH               User Key  (r) = Recorded By, (t) = Taken By, (c) = Cosigned By      Initials Name Provider Type     Lisy Bautista PTA Physical Therapist Assistant                   Goals/Plan    No documentation.                  Clinical Impression       Row Name 08/28/24 1542          Pain    Posttreatment Pain Rating 8/10  -PH     Pre/Posttreatment Pain Comment pain in L LE w/ activity  -PH     Pain Intervention(s) Repositioned;Rest  -PH       Row Name 08/28/24 9946          Plan of Care Review    Plan of Care Reviewed With patient  -PH     Progress no change  -PH     Outcome Evaluation Pt was seen for PT tx this PM. Pt was in bed and sat up to EOB w/ incr time and CGA/SV. Pt stood w/ CGA. Pt amb approx 40' w/ SBA and use of fww. Pt was slow and antalgic w/ no overt LOB. Pt was UIC w/ RT at end of session. PT will prog as pt hu.  -PH       Row Name 08/28/24 2832          Vital Signs    O2 Delivery Pre Treatment nasal cannula  -PH     O2 Delivery Intra Treatment  nasal cannula  -PH     O2 Delivery Post Treatment nasal cannula  -PH       Row Name 08/28/24 1540          Positioning and Restraints    Pre-Treatment Position in bed  -PH     Post Treatment Position chair  -PH     In Chair reclined;call light within reach;encouraged to call for assist;exit alarm on;notified nsg  -PH               User Key  (r) = Recorded By, (t) = Taken By, (c) = Cosigned By      Initials Name Provider Type     Lisy Bautista PTA Physical Therapist Assistant                   Outcome Measures       Row Name 08/28/24 1542 08/28/24 0805       How much help from another person do you currently need...    Turning from your back to your side while in flat bed without using bedrails? 3  -PH 3  -BR    Moving from lying on back to sitting on the side of a flat bed without bedrails? 3  -PH 3  -BR    Moving to and from a bed to a chair (including a wheelchair)? 3  -PH 3  -BR    Standing up from a chair using your arms (e.g., wheelchair, bedside chair)? 3  -PH 3  -BR    Climbing 3-5 steps with a railing? 2  -PH 2  -BR    To walk in hospital room? 3  -PH 3  -BR    AM-PAC 6 Clicks Score (PT) 17  -PH 17  -BR    Highest Level of Mobility Goal 5 --> Static standing  -PH 5 --> Static standing  -BR      Row Name 08/28/24 1542          Functional Assessment    Outcome Measure Options AM-PAC 6 Clicks Basic Mobility (PT)  -PH               User Key  (r) = Recorded By, (t) = Taken By, (c) = Cosigned By      Initials Name Provider Type    Dileep Burton, RN Registered Nurse     Lisy Bautista PTA Physical Therapist Assistant                                 Physical Therapy Education       Title: PT OT SLP Therapies (In Progress)       Topic: Physical Therapy (Done)       Point: Mobility training (Done)       Learning Progress Summary             Patient Acceptance, DONALD ARELLANO DU by PH at 8/28/2024 1542    Acceptance, BATSHEVA ARELLANO DU by PC at 8/27/2024 1156                         Point: Home exercise program  (Done)       Learning Progress Summary             Patient Acceptance, E,D, DU by  at 8/27/2024 1156                         Point: Body mechanics (Done)       Learning Progress Summary             Patient Acceptance, E, VU,DU by  at 8/28/2024 1542    Acceptance, E,D, DU by  at 8/27/2024 1156                         Point: Precautions (Done)       Learning Progress Summary             Patient Acceptance, E, VU,DU by  at 8/28/2024 1542    Acceptance, E,D, DU by  at 8/27/2024 1156                                         User Key       Initials Effective Dates Name Provider Type Discipline    PC 06/16/21 -  Deepa Thomas PT Physical Therapist PT     06/16/21 -  Lisy Bautista PTA Physical Therapist Assistant PT                  PT Recommendation and Plan     Plan of Care Reviewed With: patient  Progress: no change  Outcome Evaluation: Pt was seen for PT tx this PM. Pt was in bed and sat up to EOB w/ incr time and CGA/SV. Pt stood w/ CGA. Pt amb approx 40' w/ SBA and use of fww. Pt was slow and antalgic w/ no overt LOB. Pt was UIC w/ RT at end of session. PT will prog as pt hu.     Time Calculation:         PT Charges       Row Name 08/28/24 1543             Time Calculation    Start Time 1513  -PH      Stop Time 1537  -PH      Time Calculation (min) 24 min  -PH      PT Received On 08/28/24  -PH      PT - Next Appointment 08/29/24  -PH         Timed Charges    53839 - PT Therapeutic Activity Minutes 23  -PH         Total Minutes    Timed Charges Total Minutes 23  -PH       Total Minutes 23  -PH                User Key  (r) = Recorded By, (t) = Taken By, (c) = Cosigned By      Initials Name Provider Type     Lisy Bautista PTA Physical Therapist Assistant                  Therapy Charges for Today       Code Description Service Date Service Provider Modifiers Qty    [id number] HC PT THERAPEUTIC ACT EA 15 MIN 8/28/2024 Lisy Bautista PTA GP 2            PT G-Codes  Outcome  Measure Options: AM-PAC 6 Clicks Basic Mobility (PT)  AM-PAC 6 Clicks Score (PT): 17  AM-PAC 6 Clicks Score (OT): 15  PT Discharge Summary  Anticipated Discharge Disposition (PT): assisted living    Lisy Bautista, PTA  8/28/2024

## 2024-08-28 NOTE — PROGRESS NOTES
.[facility] Clinical Pharmacy Services: Warfarin Dosing/Monitoring Consult    Caitlyn Longoria is a 89 y.o. female, estimated creatinine clearance is 51.7 mL/min (by C-G formula based on SCr of 0.74 mg/dL). weighing 80.5 kg (177 lb 6.4 oz).    Results from last 7 days   Lab Units 08/28/24  0432 08/27/24  0540 08/26/24  1139 08/25/24  0655 08/24/24  0537   INR  2.60* 2.70* 2.27* 1.98* 1.94*   HEMOGLOBIN g/dL 11.2* 11.0* 14.1 11.5* 11.2*   HEMATOCRIT % 35.8 35.1 43.8 36.9 35.3   PLATELETS 10*3/mm3 155 144 141 115* 110*     Prior to admission anticoagulation: warfarin 2 mg Tue, Thurs; 4 mg all other days    Hospital Anticoagulation:  Consulting provider: Giancarlo  Start date: pta  Indication: A Fib - requiring full anticoagulation  Target INR: 2 - 3  Expected duration: tbd   Bridge Therapy: No      Potential food or drug interactions: none at this time    Education complete?/Date: N/A; home medication    Assessment/Plan:  Dose: INR is therapeutic at 2.60 - no changes to warfarin regimen   Monitor for any signs or symptoms of bleeding  Follow up daily INRs and dose adjustments    Pharmacy will continue to follow until discharge or discontinuation of warfarin.     Pooja Mccall, PharmD  Clinical Pharmacist

## 2024-08-28 NOTE — TELEPHONE ENCOUNTER
Will contact scheduling, to cancel 8/30 appts due to being inpatient for cellulitis. Informed MD and nurse.

## 2024-08-28 NOTE — PROGRESS NOTES
Name: Caitlyn Longoria ADMIT: 2024   : 1934  PCP: Zenaida Suarez MD    MRN: [id number] LOS: 1 days   AGE/SEX: 89 y.o. female  ROOM: [facility]     Subjective   Subjective   No acute events. Patient overall feels better.  She has thick respiratory secretions normally and uses saline nebulizers at home, requesting same.  No family at bedside.    Objective   Objective   Vital Signs  Temp:  [97.7 °F (36.5 °C)-98.1 °F (36.7 °C)] 97.9 °F (36.6 °C)  Heart Rate:  [80-82] 80  Resp:  [16-20] 16  BP: (109-132)/(72-84) 109/72  SpO2:  [92 %-100 %] 97 %  on  Flow (L/min):  [2] 2;   Device (Oxygen Therapy): room air  Body mass index is 31.42 kg/m².  Physical Exam  Vitals and nursing note reviewed.   Constitutional:       General: She is not in acute distress.     Appearance: She is ill-appearing (chronically). She is not toxic-appearing.   HENT:      Head: Normocephalic and atraumatic.      Nose: Nose normal.      Mouth/Throat:      Mouth: Mucous membranes are moist.      Pharynx: Oropharynx is clear.   Eyes:      Conjunctiva/sclera: Conjunctivae normal.      Pupils: Pupils are equal, round, and reactive to light.   Cardiovascular:      Rate and Rhythm: Normal rate and regular rhythm.      Pulses: Normal pulses.   Pulmonary:      Effort: Pulmonary effort is normal.      Breath sounds: Normal breath sounds. No wheezing or rales.   Abdominal:      General: Bowel sounds are normal. There is no distension.      Palpations: Abdomen is soft.      Tenderness: There is no abdominal tenderness.   Musculoskeletal:         General: Swelling (trace RLE, 2+ LLE) present.      Cervical back: Neck supple.   Skin:     General: Skin is warm and dry.      Capillary Refill: Capillary refill takes less than 2 seconds.      Findings: Erythema (LLE, improving) present.   Neurological:      General: No focal deficit present.      Mental Status: She is alert and oriented to person, place, and time.   Psychiatric:         Mood  "and Affect: Mood normal.         Behavior: Behavior normal.       Results Review     I reviewed the patient's new clinical results.  Results from last 7 days   Lab Units 08/28/24  0432 08/27/24  0540 08/26/24  1139 08/25/24  0655   WBC 10*3/mm3 15.30* 17.29* 18.79* 15.65*   HEMOGLOBIN g/dL 11.2* 11.0* 14.1 11.5*   PLATELETS 10*3/mm3 155 144 141 115*     Results from last 7 days   Lab Units 08/28/24  0432 08/27/24  0540 08/26/24  1139 08/25/24  0655   SODIUM mmol/L 137 136 139 137   POTASSIUM mmol/L 3.3* 3.7 4.1 4.5   CHLORIDE mmol/L 103 102 98 102   CO2 mmol/L 23.0 23.1 27.4 27.0   BUN mg/dL 13 16 19 19   CREATININE mg/dL 0.74 0.83 1.00 0.79   GLUCOSE mg/dL 132* 80 99 108*   EGFR mL/min/1.73 77.4 67.5 54.0* 71.6     Results from last 7 days   Lab Units 08/26/24  1139 08/24/24  0537 08/23/24  0614 08/22/24  1146   ALBUMIN g/dL 4.1 3.2* 3.4* 3.6   BILIRUBIN mg/dL 2.5* 1.4* 1.4* 1.7*   ALK PHOS U/L 218* 90 91 82   AST (SGOT) U/L 217* 22 22 26   ALT (SGPT) U/L 175* 15 15 20     Results from last 7 days   Lab Units 08/28/24  0432 08/27/24  0540 08/26/24  1139 08/25/24  0655 08/24/24  0537 08/23/24  0614 08/22/24  1419 08/22/24  1146   CALCIUM mg/dL 8.0* 8.5* 10.3 8.9 8.5* 8.8 7.7* 8.7   ALBUMIN g/dL  --   --  4.1  --  3.2* 3.4*  --  3.6   MAGNESIUM mg/dL  --   --   --  2.1  --  2.4  --  1.5*   PHOSPHORUS mg/dL  --   --   --  3.3  --  2.3* 2.3*  --      Results from last 7 days   Lab Units 08/26/24  1139 08/22/24  1146   PROCALCITONIN ng/mL  --  4.86*   LACTATE mmol/L 1.6  --      No results found for: \"HGBA1C\", \"POCGLU\"      No radiology results for the last day    I have personally reviewed all medications:  Scheduled Medications  albuterol, 2.5 mg, Nebulization, Q4H  budesonide, 0.5 mg, Nebulization, BID  carvedilol, 3.125 mg, Oral, BID  cefepime, 1,000 mg, Intravenous, Q12H  cetirizine, 10 mg, Oral, Nightly  fluticasone, 1 spray, Nasal, Daily  furosemide, 20 mg, Oral, Daily  montelukast, 10 mg, Oral, " Nightly  oxybutynin XL, 10 mg, Oral, BID  potassium chloride ER, 40 mEq, Oral, Q4H  rosuvastatin, 20 mg, Oral, Nightly  sodium chloride, 10 mL, Intravenous, Q12H  sodium chloride, 4 mL, Nebulization, BID - RT  vancomycin, 1,000 mg, Intravenous, Q24H  warfarin, 2 mg, Oral, Once per day on Tuesday Thursday  warfarin, 4 mg, Oral, Once per day on Sunday Monday Wednesday Friday Saturday    Infusions  Pharmacy to dose vancomycin,   Pharmacy to dose warfarin,     Diet  Diet: Cardiac, Diabetic; Healthy Heart (2-3 Na+); Consistent Carbohydrate; Fluid Consistency: Thin (IDDSI 0)    I have personally reviewed:  [x]  Laboratory   [x]  Microbiology   []  Radiology   [x]  EKG/Telemetry  []  Cardiology/Vascular   []  Pathology    []  Records    Assessment/Plan     Active Hospital Problems    Diagnosis  POA    **Cellulitis of left lower leg [L03.116]  Yes    COPD (chronic obstructive pulmonary disease) [J44.9]  Yes    Hypokalemia [E87.6]  Yes    Anticoagulated on Coumadin [Z79.01]  Not Applicable    CLL (chronic lymphocytic leukemia) [C91.10]  Yes    Morbid obesity [E66.01]  Yes    Chronic combined systolic (congestive) and diastolic (congestive) heart failure [I50.42]  Yes    HTN (hypertension) [I10]  Yes    Coronary artery disease [I25.10]  Yes    HLD (hyperlipidemia) [E78.5]  Yes    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   LLE Cellulitis  - in a diabetic patient with hypogammaglobulinemia, improving  - continue IV vancomycin and cefepime   - blood cultures NGTD  - venous doppler showed no DVT     HTN/CAD/PAF/Chronic Combined Systolic and Diastolic CHF  - BP, HR, and volume states acceptable, no anginal symptoms  - continue coreg and lasix  - on AC with warfarin, pharmacy dosing     Type 2 DM  - BG acceptable  - holding metformin and glipizide  - continue coverage with  ssi/hypoglycemia protocol     COPD  - no exacerbation  - continue current regimen, add saline nebs and mucinex to help with secretions    CLL  - has chronic leukocytosis usually 14-18k    Warfarin (home med) for DVT prophylaxis.  Full code.  Discussed with patient and nursing staff.  Anticipate discharge home in 1-2 days.  Expected Discharge Date: 8/30/2024; Expected Discharge Time:       Sergey Mondragon MD  [facility]  08/28/24  13:07 EDT    Portions of this text have been copied and I have reviewed them. They are accurate as of 8/28/2024

## 2024-08-28 NOTE — PROGRESS NOTES
Patient was choking on thick secretion during 7% sodium chloride treatment around 1130 AM.. RT has to stop treatment.

## 2024-08-28 NOTE — PROGRESS NOTES
Patient is requesting hypertonic saline to help with sputum production during neb treatments.  Patient takes this as needed at home.

## 2024-08-29 LAB
ANION GAP SERPL CALCULATED.3IONS-SCNC: 8.3 MMOL/L (ref 5–15)
BASOPHILS # BLD AUTO: 0.01 10*3/MM3 (ref 0–0.2)
BASOPHILS NFR BLD AUTO: 0.1 % (ref 0–1.5)
BUN SERPL-MCNC: 16 MG/DL (ref 8–23)
BUN/CREAT SERPL: 20.3 (ref 7–25)
CALCIUM SPEC-SCNC: 8 MG/DL (ref 8.6–10.5)
CHLORIDE SERPL-SCNC: 107 MMOL/L (ref 98–107)
CO2 SERPL-SCNC: 23.7 MMOL/L (ref 22–29)
CREAT SERPL-MCNC: 0.79 MG/DL (ref 0.57–1)
DEPRECATED RDW RBC AUTO: 46.4 FL (ref 37–54)
EGFRCR SERPLBLD CKD-EPI 2021: 71.6 ML/MIN/1.73
EOSINOPHIL # BLD AUTO: 0 10*3/MM3 (ref 0–0.4)
EOSINOPHIL NFR BLD AUTO: 0 % (ref 0.3–6.2)
ERYTHROCYTE [DISTWIDTH] IN BLOOD BY AUTOMATED COUNT: 12.5 % (ref 12.3–15.4)
GLUCOSE SERPL-MCNC: 143 MG/DL (ref 65–99)
HCT VFR BLD AUTO: 35.5 % (ref 34–46.6)
HGB BLD-MCNC: 11.2 G/DL (ref 12–15.9)
IMM GRANULOCYTES # BLD AUTO: 0.04 10*3/MM3 (ref 0–0.05)
IMM GRANULOCYTES NFR BLD AUTO: 0.2 % (ref 0–0.5)
INR PPP: 2.65 (ref 0.9–1.1)
LYMPHOCYTES # BLD AUTO: 12.02 10*3/MM3 (ref 0.7–3.1)
LYMPHOCYTES NFR BLD AUTO: 74.3 % (ref 19.6–45.3)
MCH RBC QN AUTO: 31.6 PG (ref 26.6–33)
MCHC RBC AUTO-ENTMCNC: 31.5 G/DL (ref 31.5–35.7)
MCV RBC AUTO: 100.3 FL (ref 79–97)
MONOCYTES # BLD AUTO: 0.12 10*3/MM3 (ref 0.1–0.9)
MONOCYTES NFR BLD AUTO: 0.7 % (ref 5–12)
NEUTROPHILS NFR BLD AUTO: 24.7 % (ref 42.7–76)
NEUTROPHILS NFR BLD AUTO: 3.98 10*3/MM3 (ref 1.7–7)
PLATELET # BLD AUTO: 174 10*3/MM3 (ref 140–450)
PMV BLD AUTO: 9.9 FL (ref 6–12)
POTASSIUM SERPL-SCNC: 4.1 MMOL/L (ref 3.5–5.2)
PROTHROMBIN TIME: 28.5 SECONDS (ref 11.7–14.2)
RBC # BLD AUTO: 3.54 10*6/MM3 (ref 3.77–5.28)
SODIUM SERPL-SCNC: 139 MMOL/L (ref 136–145)
VANCOMYCIN TROUGH SERPL-MCNC: 10.8 MCG/ML (ref 5–20)
WBC NRBC COR # BLD AUTO: 16.17 10*3/MM3 (ref 3.4–10.8)

## 2024-08-29 PROCEDURE — 94799 UNLISTED PULMONARY SVC/PX: CPT

## 2024-08-29 PROCEDURE — 25810000003 SODIUM CHLORIDE 0.9 % SOLUTION 250 ML FLEX CONT: Performed by: INTERNAL MEDICINE

## 2024-08-29 PROCEDURE — 80048 BASIC METABOLIC PNL TOTAL CA: CPT | Performed by: INTERNAL MEDICINE

## 2024-08-29 PROCEDURE — 85610 PROTHROMBIN TIME: CPT | Performed by: INTERNAL MEDICINE

## 2024-08-29 PROCEDURE — 80202 ASSAY OF VANCOMYCIN: CPT | Performed by: INTERNAL MEDICINE

## 2024-08-29 PROCEDURE — 25010000002 VANCOMYCIN 1 G RECONSTITUTED SOLUTION 1 EACH VIAL: Performed by: INTERNAL MEDICINE

## 2024-08-29 PROCEDURE — 85025 COMPLETE CBC W/AUTO DIFF WBC: CPT | Performed by: INTERNAL MEDICINE

## 2024-08-29 PROCEDURE — 25010000002 CEFEPIME PER 500 MG: Performed by: INTERNAL MEDICINE

## 2024-08-29 PROCEDURE — 94664 DEMO&/EVAL PT USE INHALER: CPT

## 2024-08-29 RX ADMIN — Medication 10 ML: at 08:34

## 2024-08-29 RX ADMIN — OXYBUTYNIN CHLORIDE 10 MG: 10 TABLET, EXTENDED RELEASE ORAL at 20:24

## 2024-08-29 RX ADMIN — CETIRIZINE HYDROCHLORIDE 10 MG: 10 TABLET ORAL at 20:24

## 2024-08-29 RX ADMIN — CARVEDILOL 3.12 MG: 3.12 TABLET, FILM COATED ORAL at 08:33

## 2024-08-29 RX ADMIN — WARFARIN 2 MG: 2 TABLET ORAL at 17:46

## 2024-08-29 RX ADMIN — BUDESONIDE 0.5 MG: 0.5 INHALANT RESPIRATORY (INHALATION) at 20:31

## 2024-08-29 RX ADMIN — MONTELUKAST SODIUM 10 MG: 10 TABLET, FILM COATED ORAL at 20:24

## 2024-08-29 RX ADMIN — CEFEPIME 1000 MG: 1 INJECTION, POWDER, FOR SOLUTION INTRAMUSCULAR; INTRAVENOUS at 02:17

## 2024-08-29 RX ADMIN — FLUTICASONE PROPIONATE 1 SPRAY: 50 SPRAY, METERED NASAL at 08:33

## 2024-08-29 RX ADMIN — Medication 10 ML: at 20:24

## 2024-08-29 RX ADMIN — VANCOMYCIN HYDROCHLORIDE 1000 MG: 1 INJECTION, POWDER, LYOPHILIZED, FOR SOLUTION INTRAVENOUS at 22:32

## 2024-08-29 RX ADMIN — ROSUVASTATIN CALCIUM 20 MG: 20 TABLET, FILM COATED ORAL at 20:24

## 2024-08-29 RX ADMIN — ALBUTEROL SULFATE 2.5 MG: 2.5 SOLUTION RESPIRATORY (INHALATION) at 07:02

## 2024-08-29 RX ADMIN — OXYBUTYNIN CHLORIDE 10 MG: 10 TABLET, EXTENDED RELEASE ORAL at 08:33

## 2024-08-29 RX ADMIN — BUDESONIDE 0.5 MG: 0.5 INHALANT RESPIRATORY (INHALATION) at 07:07

## 2024-08-29 RX ADMIN — ALBUTEROL SULFATE 2.5 MG: 2.5 SOLUTION RESPIRATORY (INHALATION) at 20:29

## 2024-08-29 RX ADMIN — CEFEPIME 1000 MG: 1 INJECTION, POWDER, FOR SOLUTION INTRAMUSCULAR; INTRAVENOUS at 15:00

## 2024-08-29 RX ADMIN — ALBUTEROL SULFATE 2.5 MG: 2.5 SOLUTION RESPIRATORY (INHALATION) at 14:57

## 2024-08-29 RX ADMIN — ALBUTEROL SULFATE 2.5 MG: 2.5 SOLUTION RESPIRATORY (INHALATION) at 10:52

## 2024-08-29 RX ADMIN — FUROSEMIDE 20 MG: 20 TABLET ORAL at 08:33

## 2024-08-29 NOTE — PLAN OF CARE
Goal Outcome Evaluation:   Pt rested well this shift, 2L o2 nc, no c/o pain or cough, administered meds per mar, vss, no ss of acute distress noted, will continue to monitor.      Problem: Adult Inpatient Plan of Care  Goal: Absence of Hospital-Acquired Illness or Injury  Intervention: Identify and Manage Fall Risk  Recent Flowsheet Documentation  Taken 8/29/2024 0608 by Pedro Valencia RN  Safety Promotion/Fall Prevention:   activity supervised   room organization consistent   safety round/check completed  Taken 8/29/2024 0408 by Pedro Valencia RN  Safety Promotion/Fall Prevention:   activity supervised   room organization consistent   safety round/check completed  Taken 8/29/2024 0208 by Pedro Valencia RN  Safety Promotion/Fall Prevention:   activity supervised   room organization consistent   safety round/check completed  Taken 8/29/2024 0010 by Pedro Valencia RN  Safety Promotion/Fall Prevention:   activity supervised   room organization consistent   safety round/check completed  Taken 8/28/2024 2208 by Pedro Valencia RN  Safety Promotion/Fall Prevention:   activity supervised   safety round/check completed   room organization consistent  Taken 8/28/2024 2025 by Pedro Valencia RN  Safety Promotion/Fall Prevention:   activity supervised   room organization consistent   safety round/check completed  Intervention: Prevent Skin Injury  Recent Flowsheet Documentation  Taken 8/29/2024 0608 by Pedro Valencia RN  Body Position: position changed independently  Taken 8/29/2024 0408 by Pedro Valencia RN  Body Position: position changed independently  Taken 8/29/2024 0208 by Pedro Valencia RN  Body Position: position changed independently  Taken 8/29/2024 0010 by Pedro Valencia RN  Body Position: position changed independently  Skin Protection: adhesive use limited  Taken 8/28/2024 2208 by Pedro Valencia RN  Body Position: position changed independently  Taken 8/28/2024 2025 by  Pedro Valencia RN  Body Position: position changed independently  Skin Protection: adhesive use limited  Intervention: Prevent and Manage VTE (Venous Thromboembolism) Risk  Recent Flowsheet Documentation  Taken 8/29/2024 0010 by Pedro Valencia RN  Activity Management: bedrest  Range of Motion: active ROM (range of motion) encouraged  Taken 8/28/2024 2025 by Pedro Valencia RN  Activity Management: bedrest  Range of Motion: active ROM (range of motion) encouraged  Intervention: Prevent Infection  Recent Flowsheet Documentation  Taken 8/29/2024 0608 by Pedro Valencia RN  Infection Prevention:   hand hygiene promoted   rest/sleep promoted  Taken 8/29/2024 0408 by Pedro Valencia RN  Infection Prevention:   hand hygiene promoted   rest/sleep promoted  Taken 8/29/2024 0208 by Pedro Valencia RN  Infection Prevention:   hand hygiene promoted   rest/sleep promoted  Taken 8/29/2024 0010 by Pedro Valencia RN  Infection Prevention:   hand hygiene promoted   rest/sleep promoted  Taken 8/28/2024 2208 by Pedro Valencia RN  Infection Prevention:   hand hygiene promoted   rest/sleep promoted  Taken 8/28/2024 2025 by Pedro Valencia RN  Infection Prevention:   hand hygiene promoted   rest/sleep promoted  Goal: Optimal Comfort and Wellbeing  Intervention: Provide Person-Centered Care  Recent Flowsheet Documentation  Taken 8/29/2024 0010 by Pedro Valencia RN  Trust Relationship/Rapport:   care explained   choices provided  Taken 8/28/2024 2025 by Pedro Valencia RN  Trust Relationship/Rapport:   care explained   choices provided     Problem: Asthma Comorbidity  Goal: Maintenance of Asthma Control  Intervention: Maintain Asthma Symptom Control  Recent Flowsheet Documentation  Taken 8/29/2024 0608 by Pedro Valencia RN  Medication Review/Management: medications reviewed  Taken 8/29/2024 0408 by Pedro Valencia RN  Medication Review/Management: medications reviewed  Taken 8/29/2024 0208 by  Pedro Valencia RN  Medication Review/Management: medications reviewed  Taken 8/29/2024 0010 by Pedro Valencia RN  Medication Review/Management: medications reviewed  Taken 8/28/2024 2208 by Pedro Valencia RN  Medication Review/Management: medications reviewed  Taken 8/28/2024 2025 by Pedro Valencia RN  Medication Review/Management: medications reviewed     Problem: Behavioral Health Comorbidity  Goal: Maintenance of Behavioral Health Symptom Control  Intervention: Maintain Behavioral Health Symptom Control  Recent Flowsheet Documentation  Taken 8/29/2024 0608 by Pedro Valencia RN  Medication Review/Management: medications reviewed  Taken 8/29/2024 0408 by Pedro Valencia RN  Medication Review/Management: medications reviewed  Taken 8/29/2024 0208 by Pedro Valencia RN  Medication Review/Management: medications reviewed  Taken 8/29/2024 0010 by Pedro Valencia RN  Medication Review/Management: medications reviewed  Taken 8/28/2024 2208 by Pedro Valencia RN  Medication Review/Management: medications reviewed  Taken 8/28/2024 2025 by Pedro Valencia RN  Medication Review/Management: medications reviewed     Problem: COPD (Chronic Obstructive Pulmonary Disease) Comorbidity  Goal: Maintenance of COPD Symptom Control  Intervention: Maintain COPD-Symptom Control  Recent Flowsheet Documentation  Taken 8/29/2024 0608 by Pedro Valencia RN  Medication Review/Management: medications reviewed  Taken 8/29/2024 0408 by Pedro Valencia RN  Medication Review/Management: medications reviewed  Taken 8/29/2024 0208 by Pedro Valencia RN  Medication Review/Management: medications reviewed  Taken 8/29/2024 0010 by Pedro Valencia RN  Medication Review/Management: medications reviewed  Taken 8/28/2024 2208 by Pedro Valencia RN  Medication Review/Management: medications reviewed  Taken 8/28/2024 2025 by Pedro Valencia RN  Medication Review/Management: medications reviewed      Problem: Heart Failure Comorbidity  Goal: Maintenance of Heart Failure Symptom Control  Intervention: Maintain Heart Failure-Management  Recent Flowsheet Documentation  Taken 8/29/2024 0608 by Pedro Valencia RN  Medication Review/Management: medications reviewed  Taken 8/29/2024 0408 by Pedro Valencia RN  Medication Review/Management: medications reviewed  Taken 8/29/2024 0208 by Pedro Valencia RN  Medication Review/Management: medications reviewed  Taken 8/29/2024 0010 by Pedro Valencia RN  Medication Review/Management: medications reviewed  Taken 8/28/2024 2208 by Pedro Valencia RN  Medication Review/Management: medications reviewed  Taken 8/28/2024 2025 by Pedro Valencia RN  Medication Review/Management: medications reviewed     Problem: Hypertension Comorbidity  Goal: Blood Pressure in Desired Range  Intervention: Maintain Blood Pressure Management  Recent Flowsheet Documentation  Taken 8/29/2024 0608 by Pedro Valencia RN  Medication Review/Management: medications reviewed  Taken 8/29/2024 0408 by Pedro Valencia RN  Medication Review/Management: medications reviewed  Taken 8/29/2024 0208 by Pedro Valencia RN  Medication Review/Management: medications reviewed  Taken 8/29/2024 0010 by Pedro Valencia RN  Medication Review/Management: medications reviewed  Taken 8/28/2024 2208 by Pedro Valencia RN  Medication Review/Management: medications reviewed  Taken 8/28/2024 2025 by Pedro Valencia RN  Medication Review/Management: medications reviewed     Problem: Osteoarthritis Comorbidity  Goal: Maintenance of Osteoarthritis Symptom Control  Intervention: Maintain Osteoarthritis Symptom Control  Recent Flowsheet Documentation  Taken 8/29/2024 0608 by Pedro Valencia RN  Medication Review/Management: medications reviewed  Taken 8/29/2024 0408 by Pedro Valencia RN  Medication Review/Management: medications reviewed  Taken 8/29/2024 0208 by Pedro Valencia  RN  Medication Review/Management: medications reviewed  Taken 8/29/2024 0010 by Pedro Valencia RN  Activity Management: bedrest  Medication Review/Management: medications reviewed  Taken 8/28/2024 2208 by Pedro Valencia RN  Medication Review/Management: medications reviewed  Taken 8/28/2024 2025 by Pedro Valencia RN  Activity Management: bedrest  Medication Review/Management: medications reviewed     Problem: Pain Chronic (Persistent) (Comorbidity Management)  Goal: Acceptable Pain Control and Functional Ability  Intervention: Manage Persistent Pain  Recent Flowsheet Documentation  Taken 8/29/2024 0608 by Pedro Valencia RN  Medication Review/Management: medications reviewed  Taken 8/29/2024 0408 by Pedro Valencia RN  Medication Review/Management: medications reviewed  Taken 8/29/2024 0208 by Pedro Valencia RN  Medication Review/Management: medications reviewed  Taken 8/29/2024 0010 by Pedro Valencia RN  Medication Review/Management: medications reviewed  Taken 8/28/2024 2208 by Pedro Valencia RN  Medication Review/Management: medications reviewed  Taken 8/28/2024 2025 by Pedro Valencia RN  Medication Review/Management: medications reviewed  Intervention: Optimize Psychosocial Wellbeing  Recent Flowsheet Documentation  Taken 8/29/2024 0010 by Pedro Valencia RN  Diversional Activities: television  Taken 8/28/2024 2025 by Pedro Valencia RN  Diversional Activities: television     Problem: Skin Injury Risk Increased  Goal: Skin Health and Integrity  Intervention: Optimize Skin Protection  Recent Flowsheet Documentation  Taken 8/29/2024 0608 by Pedro Valencia RN  Head of Bed ([facility]) Positioning: HOB elevated  Taken 8/29/2024 0408 by Pedro Valencia RN  Head of Bed ([facility]) Positioning: HOB elevated  Taken 8/29/2024 0208 by Pedro Valencia RN  Head of Bed ([facility]) Positioning: HOB elevated  Taken 8/29/2024 0010 by Pedro Valencia RN  Pressure Reduction Techniques:   frequent  weight shift encouraged   weight shift assistance provided  Head of Bed (HOB) Positioning: HOB elevated  Pressure Reduction Devices:   alternating pressure pump (ADD)   pressure-redistributing mattress utilized  Skin Protection: adhesive use limited  Taken 8/28/2024 2208 by Pedro Valencia RN  Head of Bed (HOB) Positioning: HOB elevated  Taken 8/28/2024 2025 by Pedro Valencia RN  Pressure Reduction Techniques:   frequent weight shift encouraged   weight shift assistance provided  Head of Bed (HOB) Positioning: HOB elevated  Pressure Reduction Devices:   alternating pressure pump (ADD)   pressure-redistributing mattress utilized  Skin Protection: adhesive use limited     Problem: Infection Progression (Sepsis/Septic Shock)  Goal: Absence of Infection Signs and Symptoms  Intervention: Initiate Sepsis Management  Recent Flowsheet Documentation  Taken 8/29/2024 0608 by Pedro Valencia RN  Infection Prevention:   hand hygiene promoted   rest/sleep promoted  Taken 8/29/2024 0408 by Pedro Valencia RN  Infection Prevention:   hand hygiene promoted   rest/sleep promoted  Taken 8/29/2024 0208 by Pedro Valencia RN  Infection Prevention:   hand hygiene promoted   rest/sleep promoted  Taken 8/29/2024 0010 by Pedro Valencia RN  Infection Management: aseptic technique maintained  Infection Prevention:   hand hygiene promoted   rest/sleep promoted  Taken 8/28/2024 2208 by Pedro Valencia RN  Infection Prevention:   hand hygiene promoted   rest/sleep promoted  Taken 8/28/2024 2025 by Pedro Valencia RN  Infection Management: aseptic technique maintained  Infection Prevention:   hand hygiene promoted   rest/sleep promoted  Intervention: Promote Recovery  Recent Flowsheet Documentation  Taken 8/29/2024 0010 by Pedro Valencia RN  Activity Management: bedrest  Taken 8/28/2024 2025 by Pedro Valencia RN  Activity Management: bedrest     Problem: Fall Injury Risk  Goal: Absence of Fall and Fall-Related  Injury  Intervention: Identify and Manage Contributors  Recent Flowsheet Documentation  Taken 8/29/2024 0608 by Pedro Valencia RN  Medication Review/Management: medications reviewed  Taken 8/29/2024 0408 by Pedro Valencia RN  Medication Review/Management: medications reviewed  Taken 8/29/2024 0208 by Pedro Valencia RN  Medication Review/Management: medications reviewed  Taken 8/29/2024 0010 by Pedro Valencia RN  Medication Review/Management: medications reviewed  Taken 8/28/2024 2208 by Pedro Valencia RN  Medication Review/Management: medications reviewed  Taken 8/28/2024 2025 by Pedro Valencia RN  Medication Review/Management: medications reviewed  Intervention: Promote Injury-Free Environment  Recent Flowsheet Documentation  Taken 8/29/2024 0608 by Pedro Valencia RN  Safety Promotion/Fall Prevention:   activity supervised   room organization consistent   safety round/check completed  Taken 8/29/2024 0408 by Pedro Valencia RN  Safety Promotion/Fall Prevention:   activity supervised   room organization consistent   safety round/check completed  Taken 8/29/2024 0208 by Pedro Valencia RN  Safety Promotion/Fall Prevention:   activity supervised   room organization consistent   safety round/check completed  Taken 8/29/2024 0010 by Pedro Valencia RN  Safety Promotion/Fall Prevention:   activity supervised   room organization consistent   safety round/check completed  Taken 8/28/2024 2208 by Pedro Valencia RN  Safety Promotion/Fall Prevention:   activity supervised   safety round/check completed   room organization consistent  Taken 8/28/2024 2025 by Pedro Valencia RN  Safety Promotion/Fall Prevention:   activity supervised   room organization consistent   safety round/check completed

## 2024-08-29 NOTE — DISCHARGE PLACEMENT REQUEST
"Caitlyn Longoria (89 y.o. Female)       Date of Birth   1934    Social Security Number       Address   [address] [place] [place] [address]    Home Phone   [phone]    MRN   [id number]       Oriental orthodox   [employer]    Marital Status   Single                            Admission Date   8/26/24    Admission Type   Emergency    Admitting Provider   Sergey Mondragon MD    Attending Provider   Amaury Messer MD    Department, Room/Bed   [address], [address]       Discharge Date       Discharge Disposition       Discharge Destination                                 Attending Provider: Amaury Messer MD    Allergies: Accupril [Quinapril Hcl], Ahist [Chlorpheniramine], Clarithromycin, Esomeprazole, Latex, Levalbuterol, Levocetirizine, Lipitor [Atorvastatin], Omeprazole, Pravachol [Pravastatin], Sulindac, Valdecoxib, Chlorcyclizine, Diclofenac Sodium, Diphenhydramine, Lodine [Etodolac], Sulfa Antibiotics    Isolation: None   Infection: None   Code Status: CPR    Ht: 160 cm (63\")   Wt: 80.5 kg (177 lb 6.4 oz)    Admission Cmt: None   Principal Problem: Cellulitis of left lower leg [L03.116]                   Active Insurance as of 8/26/2024       Primary Coverage       Payor Plan Insurance Group Employer/Plan Group    MEDICARE MEDICARE A & B        Payor Plan Address Payor Plan Phone Number Payor Plan Fax Number Effective Dates    [address] [phone]  10/1/1999 - None Entered    [facility] [id number]         Subscriber Name Subscriber Birth Date Member ID       CAITLYN LONGORIA 1934 [id number]               Secondary Coverage       Payor Plan Insurance Group Employer/Plan Group    AARP MC SUP AARP HEALTH CARE OPTIONS PLAN F       Payor Plan Address Payor Plan Phone Number Payor Plan Fax Number Effective Dates    [facility] [phone]  1/1/2015 - None Entered    [address]       [facility] [address]         Subscriber Name Subscriber Birth Date Member " ID       DELFIN LONGORIA 1934 [id number]                     Emergency Contacts        (Rel.) Home Phone Work Phone Mobile Phone    Zoey Adair (HCS) (Relative) [phone] -- [phone]    Trae Longoria (HCS) (Brother) [phone] -- [phone]    Inez Longoria (Relative) [phone] -- [phone]

## 2024-08-29 NOTE — PLAN OF CARE
Goal Outcome Evaluation:  Plan of Care Reviewed With: patient        Progress: improving  Outcome Evaluation: pt aox3/4 forgetful, on room air, up in chair x1 assist with walker, av paced on monitor, no c/o pain at this time, meds given per mar

## 2024-08-29 NOTE — PROGRESS NOTES
Name: Caitlyn Longoria ADMIT: 2024   : 1934  PCP: Zenaida Suarez MD    MRN: [id number] LOS: 2 days   AGE/SEX: 89 y.o. female  ROOM: [facility]     Subjective   Subjective     No events overnight. She thinks that the erythema is retreating, though the swelling hasn't improved yet.       Objective   Objective   Vital Signs  Temp:  [97.9 °F (36.6 °C)-98.6 °F (37 °C)] 98.3 °F (36.8 °C)  Heart Rate:  [80-82] 82  Resp:  [16-20] 18  BP: (103-111)/(62-68) 108/62  SpO2:  [94 %-100 %] 96 %  on  Flow (L/min):  [2] 2;   Device (Oxygen Therapy): room air  Body mass index is 31.42 kg/m².  Physical Exam  Constitutional:       General: She is not in acute distress.     Appearance: She is not toxic-appearing.   Cardiovascular:      Rate and Rhythm: Normal rate and regular rhythm.      Heart sounds: Normal heart sounds.   Pulmonary:      Effort: Pulmonary effort is normal.      Breath sounds: Normal breath sounds.   Abdominal:      General: Bowel sounds are normal.      Palpations: Abdomen is soft.   Musculoskeletal:      Right lower leg: No edema.      Left lower leg: Edema present.   Skin:     Findings: Erythema and rash present.   Neurological:      Mental Status: She is alert.   Psychiatric:         Mood and Affect: Mood normal.         Behavior: Behavior normal.         Results Review     I reviewed the patient's new clinical results.  Results from last 7 days   Lab Units 24  0303 24  0432 24  0540 24  1139   WBC 10*3/mm3 16.17* 15.30* 17.29* 18.79*   HEMOGLOBIN g/dL 11.2* 11.2* 11.0* 14.1   PLATELETS 10*3/mm3 174 155 144 141     Results from last 7 days   Lab Units 24  0303 24  1850 24  0432 24  0540 24  1139   SODIUM mmol/L 139  --  137 136 139   POTASSIUM mmol/L 4.1 4.1 3.3* 3.7 4.1   CHLORIDE mmol/L 107  --  103 102 98   CO2 mmol/L 23.7  --  23.0 23.1 27.4   BUN mg/dL 16  --  13 16 19   CREATININE mg/dL 0.79  --  0.74 0.83 1.00   GLUCOSE mg/dL  143*  --  132* 80 99   Estimated Creatinine Clearance: 48.5 mL/min (by C-G formula based on SCr of 0.79 mg/dL).  Results from last 7 days   Lab Units 08/26/24  1139 08/24/24  0537 08/23/24  0614   ALBUMIN g/dL 4.1 3.2* 3.4*   BILIRUBIN mg/dL 2.5* 1.4* 1.4*   ALK PHOS U/L 218* 90 91   AST (SGOT) U/L 217* 22 22   ALT (SGPT) U/L 175* 15 15     Results from last 7 days   Lab Units 08/29/24  0303 08/28/24  0432 08/27/24  0540 08/26/24  1139 08/25/24  0655 08/24/24  0537 08/23/24  0614 08/22/24  1419   CALCIUM mg/dL 8.0* 8.0* 8.5* 10.3 8.9 8.5* 8.8 7.7*   ALBUMIN g/dL  --   --   --  4.1  --  3.2* 3.4*  --    MAGNESIUM mg/dL  --   --   --   --  2.1  --  2.4  --    PHOSPHORUS mg/dL  --   --   --   --  3.3  --  2.3* 2.3*     Results from last 7 days   Lab Units 08/26/24  1139   LACTATE mmol/L 1.6     COVID19   Date Value Ref Range Status   08/22/2024 Not Detected Not Detected - Ref. Range Final   11/09/2020 Not Detected Not Detected - Ref. Range Final   10/01/2020 Not Detected Not Detected - Ref. Range Final   08/29/2020 Not Detected Not Detected - Ref. Range Final     SARS-CoV-2 PCR   Date Value Ref Range Status   09/29/2020 Not Detected Not Detected Final     Comment:     Nucleic acid specific to SARS-CoV-2 (COVID-19) virus was not detected inthis sample by the TaqPath (TM) COVID-19 Combo Kit.SARS-CoV-2 (COVID-19) nucleic acid testing performed using [employer] Aptima (R) SARS-CoV-2 Assay or [employer] TaqPath   (TM)COVID-19 Combo Kit as indicated above under Emergency Use Authorization (EUA) from the FDA. Aptima (R) and TaqPath (TM) test performancecharacteristics verified by [facility] in accordance with the FDAs Guidance Document (Policy for Diagnostic   Tests for Coronavirus Disease-2019during the Public Health Emergency) issued on March 16, 2020. The laboratory is regulated under CLIA as qualified to perform high-complexity testingUnless otherwise noted, all testing was performed at [facility],  "  [id number], [place] State Licensee No. [id number]     No results found for: \"HGBA1C\", \"POCGLU\"    Duplex Venous Lower Extremity - Left    Normal left lower extremity venous duplex scan.    Scheduled Medications  albuterol, 2.5 mg, Nebulization, Q4H  budesonide, 0.5 mg, Nebulization, BID  carvedilol, 3.125 mg, Oral, BID  cefepime, 1,000 mg, Intravenous, Q12H  cetirizine, 10 mg, Oral, Nightly  fluticasone, 1 spray, Nasal, Daily  furosemide, 20 mg, Oral, Daily  montelukast, 10 mg, Oral, Nightly  oxybutynin XL, 10 mg, Oral, BID  rosuvastatin, 20 mg, Oral, Nightly  sodium chloride, 10 mL, Intravenous, Q12H  sodium chloride, 4 mL, Nebulization, BID - RT  vancomycin, 1,000 mg, Intravenous, Q24H  warfarin, 2 mg, Oral, Once per day on Tuesday Thursday  warfarin, 4 mg, Oral, Once per day on Sunday Monday Wednesday Friday Saturday    Infusions  Pharmacy to dose vancomycin,   Pharmacy to dose warfarin,     Diet  Diet: Cardiac, Diabetic; Healthy Heart (2-3 Na+); Consistent Carbohydrate; Fluid Consistency: Thin (IDDSI 0)       Assessment/Plan     Active Hospital Problems    Diagnosis  POA    **Cellulitis of left lower leg [L03.116]  Yes    COPD (chronic obstructive pulmonary disease) [J44.9]  Yes    Hypokalemia [E87.6]  Yes    Anticoagulated on Coumadin [Z79.01]  Not Applicable    CLL (chronic lymphocytic leukemia) [C91.10]  Yes    Morbid obesity [E66.01]  Yes    Chronic combined systolic (congestive) and diastolic (congestive) heart failure [I50.42]  Yes    HTN (hypertension) [I10]  Yes    Coronary artery disease [I25.10]  Yes    HLD (hyperlipidemia) [E78.5]  Yes    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       89 y.o. female admitted with Cellulitis of left lower leg.    LLE Cellulitis  - in a diabetic patient with hypogammaglobulinemia, improving  - continue IV vancomycin and " cefepime given that she is chronically on keflex  - blood cultures NGTD  - venous doppler showed no DVT     HTN/CAD/PAF/Chronic Combined Systolic and Diastolic CHF  - BP, HR, and volume states acceptable, no anginal symptoms  - continue coreg and lasix  - on AC with warfarin, pharmacy dosing     Type 2 DM  - BG at goal  - holding metformin and glipizide  - continue coverage with ssi/hypoglycemia protocol     COPD  - no exacerbation  - continue current regimen, add saline nebs and mucinex to help with secretions     CLL  - has chronic leukocytosis usually 14-18k    Warfarin (home med) for DVT prophylaxis.  Full code.  Discussed with patient.  Anticipate discharge home with home health in 2-3 days.      Amaury Messer MD  [facility]  08/29/24  13:32 EDT     I wore protective equipment throughout this patient encounter including a face mask, gloves and protective eyewear.  Hand hygiene was performed before donning protective equipment and after removal when leaving the room.

## 2024-08-29 NOTE — SIGNIFICANT NOTE
Pt declined PT this late Pm stating she had been up quite a bit today already. Pt was encouraged to sit up in chair for supper as well as educ on imp of continued mobility while in hospital. Pt also discussed dc plan at length and stated she had decided that she now wants to go to SNF after dc. RN notified w/ CCP not in unit or on secure chat.

## 2024-08-30 LAB
ANION GAP SERPL CALCULATED.3IONS-SCNC: 7 MMOL/L (ref 5–15)
BUN SERPL-MCNC: 14 MG/DL (ref 8–23)
BUN/CREAT SERPL: 18.9 (ref 7–25)
CALCIUM SPEC-SCNC: 8.1 MG/DL (ref 8.6–10.5)
CHLORIDE SERPL-SCNC: 105 MMOL/L (ref 98–107)
CO2 SERPL-SCNC: 27 MMOL/L (ref 22–29)
CREAT SERPL-MCNC: 0.74 MG/DL (ref 0.57–1)
DEPRECATED RDW RBC AUTO: 47.8 FL (ref 37–54)
EGFRCR SERPLBLD CKD-EPI 2021: 77.4 ML/MIN/1.73
ERYTHROCYTE [DISTWIDTH] IN BLOOD BY AUTOMATED COUNT: 12.8 % (ref 12.3–15.4)
GLUCOSE BLDC GLUCOMTR-MCNC: 133 MG/DL (ref 70–130)
GLUCOSE BLDC GLUCOMTR-MCNC: 201 MG/DL (ref 70–130)
GLUCOSE BLDC GLUCOMTR-MCNC: 209 MG/DL (ref 70–130)
GLUCOSE SERPL-MCNC: 157 MG/DL (ref 65–99)
HCT VFR BLD AUTO: 37.9 % (ref 34–46.6)
HGB BLD-MCNC: 12.1 G/DL (ref 12–15.9)
INR PPP: 2.78 (ref 0.9–1.1)
LYMPHOCYTES # BLD MANUAL: 18.53 10*3/MM3 (ref 0.7–3.1)
MCH RBC QN AUTO: 32.4 PG (ref 26.6–33)
MCHC RBC AUTO-ENTMCNC: 31.9 G/DL (ref 31.5–35.7)
MCV RBC AUTO: 101.3 FL (ref 79–97)
NEUTROPHILS # BLD AUTO: 2.77 10*3/MM3 (ref 1.7–7)
NEUTROPHILS NFR BLD MANUAL: 13 % (ref 42.7–76)
PLAT MORPH BLD: NORMAL
PLATELET # BLD AUTO: 209 10*3/MM3 (ref 140–450)
PMV BLD AUTO: 9.5 FL (ref 6–12)
POTASSIUM SERPL-SCNC: 4.1 MMOL/L (ref 3.5–5.2)
PROTHROMBIN TIME: 29.6 SECONDS (ref 11.7–14.2)
RBC # BLD AUTO: 3.74 10*6/MM3 (ref 3.77–5.28)
RBC MORPH BLD: NORMAL
SODIUM SERPL-SCNC: 139 MMOL/L (ref 136–145)
VARIANT LYMPHS NFR BLD MANUAL: 1 % (ref 0–5)
VARIANT LYMPHS NFR BLD MANUAL: 86 % (ref 19.6–45.3)
WBC MORPH BLD: NORMAL
WBC NRBC COR # BLD AUTO: 21.3 10*3/MM3 (ref 3.4–10.8)

## 2024-08-30 PROCEDURE — 94799 UNLISTED PULMONARY SVC/PX: CPT

## 2024-08-30 PROCEDURE — 94664 DEMO&/EVAL PT USE INHALER: CPT

## 2024-08-30 PROCEDURE — 97116 GAIT TRAINING THERAPY: CPT

## 2024-08-30 PROCEDURE — 85025 COMPLETE CBC W/AUTO DIFF WBC: CPT | Performed by: INTERNAL MEDICINE

## 2024-08-30 PROCEDURE — 80048 BASIC METABOLIC PNL TOTAL CA: CPT | Performed by: INTERNAL MEDICINE

## 2024-08-30 PROCEDURE — 25010000002 VANCOMYCIN 1 G RECONSTITUTED SOLUTION 1 EACH VIAL: Performed by: INTERNAL MEDICINE

## 2024-08-30 PROCEDURE — 63710000001 INSULIN LISPRO (HUMAN) PER 5 UNITS: Performed by: STUDENT IN AN ORGANIZED HEALTH CARE EDUCATION/TRAINING PROGRAM

## 2024-08-30 PROCEDURE — 85610 PROTHROMBIN TIME: CPT | Performed by: INTERNAL MEDICINE

## 2024-08-30 PROCEDURE — 82948 REAGENT STRIP/BLOOD GLUCOSE: CPT

## 2024-08-30 PROCEDURE — 85007 BL SMEAR W/DIFF WBC COUNT: CPT | Performed by: INTERNAL MEDICINE

## 2024-08-30 PROCEDURE — 25810000003 SODIUM CHLORIDE 0.9 % SOLUTION 250 ML FLEX CONT: Performed by: INTERNAL MEDICINE

## 2024-08-30 PROCEDURE — 25010000002 CEFEPIME PER 500 MG: Performed by: INTERNAL MEDICINE

## 2024-08-30 PROCEDURE — 97535 SELF CARE MNGMENT TRAINING: CPT

## 2024-08-30 PROCEDURE — 94761 N-INVAS EAR/PLS OXIMETRY MLT: CPT

## 2024-08-30 RX ORDER — METOPROLOL TARTRATE 25 MG/1
12.5 TABLET, FILM COATED ORAL EVERY 12 HOURS SCHEDULED
Status: DISCONTINUED | OUTPATIENT
Start: 2024-08-30 | End: 2024-09-03 | Stop reason: HOSPADM

## 2024-08-30 RX ORDER — INSULIN LISPRO 100 [IU]/ML
2-7 INJECTION, SOLUTION INTRAVENOUS; SUBCUTANEOUS
Status: DISCONTINUED | OUTPATIENT
Start: 2024-08-30 | End: 2024-09-03 | Stop reason: HOSPADM

## 2024-08-30 RX ORDER — DEXTROSE MONOHYDRATE 25 G/50ML
25 INJECTION, SOLUTION INTRAVENOUS
Status: DISCONTINUED | OUTPATIENT
Start: 2024-08-30 | End: 2024-09-03 | Stop reason: HOSPADM

## 2024-08-30 RX ORDER — NICOTINE POLACRILEX 4 MG
15 LOZENGE BUCCAL
Status: DISCONTINUED | OUTPATIENT
Start: 2024-08-30 | End: 2024-09-03 | Stop reason: HOSPADM

## 2024-08-30 RX ORDER — IBUPROFEN 600 MG/1
1 TABLET ORAL
Status: DISCONTINUED | OUTPATIENT
Start: 2024-08-30 | End: 2024-09-03 | Stop reason: HOSPADM

## 2024-08-30 RX ADMIN — Medication 10 ML: at 08:53

## 2024-08-30 RX ADMIN — GUAIFENESIN 200 MG: 100 LIQUID ORAL at 15:00

## 2024-08-30 RX ADMIN — OXYBUTYNIN CHLORIDE 10 MG: 10 TABLET, EXTENDED RELEASE ORAL at 08:53

## 2024-08-30 RX ADMIN — FUROSEMIDE 20 MG: 20 TABLET ORAL at 08:53

## 2024-08-30 RX ADMIN — ACETAMINOPHEN 325MG 650 MG: 325 TABLET ORAL at 21:11

## 2024-08-30 RX ADMIN — INSULIN LISPRO 3 UNITS: 100 INJECTION, SOLUTION INTRAVENOUS; SUBCUTANEOUS at 21:12

## 2024-08-30 RX ADMIN — OXYBUTYNIN CHLORIDE 10 MG: 10 TABLET, EXTENDED RELEASE ORAL at 21:16

## 2024-08-30 RX ADMIN — WARFARIN 4 MG: 4 TABLET ORAL at 17:21

## 2024-08-30 RX ADMIN — MONTELUKAST SODIUM 10 MG: 10 TABLET, FILM COATED ORAL at 21:16

## 2024-08-30 RX ADMIN — CEFEPIME 1000 MG: 1 INJECTION, POWDER, FOR SOLUTION INTRAMUSCULAR; INTRAVENOUS at 01:47

## 2024-08-30 RX ADMIN — FLUTICASONE PROPIONATE 1 SPRAY: 50 SPRAY, METERED NASAL at 08:53

## 2024-08-30 RX ADMIN — ACETAMINOPHEN 325MG 650 MG: 325 TABLET ORAL at 09:49

## 2024-08-30 RX ADMIN — Medication 4 ML: at 07:01

## 2024-08-30 RX ADMIN — INSULIN LISPRO 3 UNITS: 100 INJECTION, SOLUTION INTRAVENOUS; SUBCUTANEOUS at 11:57

## 2024-08-30 RX ADMIN — Medication 4 ML: at 20:06

## 2024-08-30 RX ADMIN — CETIRIZINE HYDROCHLORIDE 10 MG: 10 TABLET ORAL at 21:16

## 2024-08-30 RX ADMIN — ALBUTEROL SULFATE 2.5 MG: 2.5 SOLUTION RESPIRATORY (INHALATION) at 20:05

## 2024-08-30 RX ADMIN — ROSUVASTATIN CALCIUM 20 MG: 20 TABLET, FILM COATED ORAL at 21:16

## 2024-08-30 RX ADMIN — VANCOMYCIN HYDROCHLORIDE 1000 MG: 1 INJECTION, POWDER, LYOPHILIZED, FOR SOLUTION INTRAVENOUS at 21:11

## 2024-08-30 RX ADMIN — CARVEDILOL 3.12 MG: 3.12 TABLET, FILM COATED ORAL at 08:53

## 2024-08-30 RX ADMIN — BUDESONIDE 0.5 MG: 0.5 INHALANT RESPIRATORY (INHALATION) at 07:00

## 2024-08-30 RX ADMIN — GUAIFENESIN 200 MG: 100 LIQUID ORAL at 09:49

## 2024-08-30 RX ADMIN — METOPROLOL TARTRATE 12.5 MG: 25 TABLET, FILM COATED ORAL at 21:11

## 2024-08-30 RX ADMIN — CEFEPIME 1000 MG: 1 INJECTION, POWDER, FOR SOLUTION INTRAMUSCULAR; INTRAVENOUS at 13:38

## 2024-08-30 RX ADMIN — ACETAMINOPHEN 325MG 650 MG: 325 TABLET ORAL at 13:38

## 2024-08-30 RX ADMIN — ALBUTEROL SULFATE 2.5 MG: 2.5 SOLUTION RESPIRATORY (INHALATION) at 06:59

## 2024-08-30 RX ADMIN — ALBUTEROL SULFATE 2.5 MG: 2.5 SOLUTION RESPIRATORY (INHALATION) at 15:46

## 2024-08-30 RX ADMIN — Medication 10 ML: at 21:12

## 2024-08-30 RX ADMIN — ALBUTEROL SULFATE 2.5 MG: 2.5 SOLUTION RESPIRATORY (INHALATION) at 11:19

## 2024-08-30 RX ADMIN — BUDESONIDE 0.5 MG: 0.5 INHALANT RESPIRATORY (INHALATION) at 20:06

## 2024-08-30 RX ADMIN — ALBUTEROL SULFATE 2.5 MG: 2.5 SOLUTION RESPIRATORY (INHALATION) at 22:58

## 2024-08-30 NOTE — PLAN OF CARE
Goal Outcome Evaluation:      Pt resting on the bed, alert and oriented, Vpaced on monitor, RA, no c/o pain or cough, vss, no ss of acute distress noted, will continue to monitor.      Problem: Adult Inpatient Plan of Care  Goal: Absence of Hospital-Acquired Illness or Injury  Intervention: Identify and Manage Fall Risk  Recent Flowsheet Documentation  Taken 8/30/2024 0604 by Pedro Valencia RN  Safety Promotion/Fall Prevention:   activity supervised   room organization consistent   safety round/check completed  Taken 8/30/2024 0408 by Pedro Valencia RN  Safety Promotion/Fall Prevention:   activity supervised   room organization consistent   safety round/check completed  Taken 8/30/2024 0208 by Pedro Valencia RN  Safety Promotion/Fall Prevention:   activity supervised   room organization consistent   safety round/check completed  Taken 8/30/2024 0010 by Pedro Valencia RN  Safety Promotion/Fall Prevention:   activity supervised   room organization consistent   safety round/check completed  Taken 8/29/2024 2212 by Pedro Valencia RN  Safety Promotion/Fall Prevention:   activity supervised   safety round/check completed   room organization consistent  Taken 8/29/2024 2020 by Pedro Valencia RN  Safety Promotion/Fall Prevention:   activity supervised   room organization consistent   safety round/check completed  Intervention: Prevent Skin Injury  Recent Flowsheet Documentation  Taken 8/30/2024 0604 by Pedro Valencia RN  Body Position: weight shifting  Taken 8/30/2024 0408 by Pedro Valencia RN  Body Position: legs elevated  Taken 8/30/2024 0208 by Pedro Valencia RN  Body Position: supine  Taken 8/30/2024 0010 by Pedro Valencia RN  Body Position:   left   turned  Skin Protection: adhesive use limited  Taken 8/29/2024 2212 by Pedro Valencia RN  Body Position:   left   turned  Taken 8/29/2024 2020 by Pedro Valencia RN  Body Position: supine  Skin Protection: adhesive use  limited  Intervention: Prevent and Manage VTE (Venous Thromboembolism) Risk  Recent Flowsheet Documentation  Taken 8/30/2024 0010 by Pedro Valencia RN  Activity Management: bedrest  Range of Motion: active ROM (range of motion) encouraged  Taken 8/29/2024 2020 by Pedro Valencia RN  Activity Management: bedrest  Range of Motion: active ROM (range of motion) encouraged  Intervention: Prevent Infection  Recent Flowsheet Documentation  Taken 8/30/2024 0604 by Pedro Valencia RN  Infection Prevention:   hand hygiene promoted   rest/sleep promoted  Taken 8/30/2024 0408 by Pedro Valencia RN  Infection Prevention:   hand hygiene promoted   rest/sleep promoted  Taken 8/30/2024 0208 by Pedro Valencia RN  Infection Prevention:   hand hygiene promoted   single patient room provided   rest/sleep promoted  Taken 8/30/2024 0010 by Pedro Valencia RN  Infection Prevention:   hand hygiene promoted   rest/sleep promoted  Taken 8/29/2024 2212 by Pedro Valencia RN  Infection Prevention:   hand hygiene promoted   personal protective equipment utilized   rest/sleep promoted  Taken 8/29/2024 2020 by Pedro Valencia RN  Infection Prevention:   hand hygiene promoted   rest/sleep promoted  Goal: Optimal Comfort and Wellbeing  Intervention: Monitor Pain and Promote Comfort  Recent Flowsheet Documentation  Taken 8/29/2024 2020 by Pedro Valencia RN  Pain Management Interventions: quiet environment facilitated  Intervention: Provide Person-Centered Care  Recent Flowsheet Documentation  Taken 8/30/2024 0010 by Pedro Valencia RN  Trust Relationship/Rapport: care explained  Taken 8/29/2024 2020 by Pedro Valencia RN  Trust Relationship/Rapport:   care explained   choices provided     Problem: Asthma Comorbidity  Goal: Maintenance of Asthma Control  Intervention: Maintain Asthma Symptom Control  Recent Flowsheet Documentation  Taken 8/30/2024 0604 by Pedro Valencia RN  Medication Review/Management:  medications reviewed  Taken 8/30/2024 0408 by Pedro Valencia RN  Medication Review/Management: medications reviewed  Taken 8/30/2024 0208 by Pedro Valencia RN  Medication Review/Management: medications reviewed  Taken 8/30/2024 0010 by Pedro Valencia RN  Medication Review/Management: medications reviewed  Taken 8/29/2024 2212 by Pedro Valencia RN  Medication Review/Management: medications reviewed  Taken 8/29/2024 2020 by Pedro Valencia RN  Medication Review/Management: medications reviewed     Problem: Behavioral Health Comorbidity  Goal: Maintenance of Behavioral Health Symptom Control  Intervention: Maintain Behavioral Health Symptom Control  Recent Flowsheet Documentation  Taken 8/30/2024 0604 by Pedro Valencia RN  Medication Review/Management: medications reviewed  Taken 8/30/2024 0408 by Pedro Valencia RN  Medication Review/Management: medications reviewed  Taken 8/30/2024 0208 by Pedro Valencia RN  Medication Review/Management: medications reviewed  Taken 8/30/2024 0010 by Pedro Valencia RN  Medication Review/Management: medications reviewed  Taken 8/29/2024 2212 by Pedro Valencia RN  Medication Review/Management: medications reviewed  Taken 8/29/2024 2020 by Pedro Valencia RN  Medication Review/Management: medications reviewed     Problem: COPD (Chronic Obstructive Pulmonary Disease) Comorbidity  Goal: Maintenance of COPD Symptom Control  Intervention: Maintain COPD-Symptom Control  Recent Flowsheet Documentation  Taken 8/30/2024 0604 by Pedro Valencia RN  Medication Review/Management: medications reviewed  Taken 8/30/2024 0408 by Pedro Valencia RN  Medication Review/Management: medications reviewed  Taken 8/30/2024 0208 by Pedro Valencia RN  Medication Review/Management: medications reviewed  Taken 8/30/2024 0010 by Pedro Valencia RN  Medication Review/Management: medications reviewed  Taken 8/29/2024 2212 by Pedro Valencia RN  Medication  Review/Management: medications reviewed  Taken 8/29/2024 2020 by Pedro Valencia RN  Medication Review/Management: medications reviewed     Problem: Heart Failure Comorbidity  Goal: Maintenance of Heart Failure Symptom Control  Intervention: Maintain Heart Failure-Management  Recent Flowsheet Documentation  Taken 8/30/2024 0604 by Pedro Valencia RN  Medication Review/Management: medications reviewed  Taken 8/30/2024 0408 by Pedro Valencia RN  Medication Review/Management: medications reviewed  Taken 8/30/2024 0208 by Pedro Valencia RN  Medication Review/Management: medications reviewed  Taken 8/30/2024 0010 by Pedro Valencia RN  Medication Review/Management: medications reviewed  Taken 8/29/2024 2212 by Pedro Valencia RN  Medication Review/Management: medications reviewed  Taken 8/29/2024 2020 by Pedro Valencia RN  Medication Review/Management: medications reviewed     Problem: Hypertension Comorbidity  Goal: Blood Pressure in Desired Range  Intervention: Maintain Blood Pressure Management  Recent Flowsheet Documentation  Taken 8/30/2024 0604 by Pedro Valencia RN  Medication Review/Management: medications reviewed  Taken 8/30/2024 0408 by Pedro Valencia RN  Medication Review/Management: medications reviewed  Taken 8/30/2024 0208 by Pedro Valnecia RN  Medication Review/Management: medications reviewed  Taken 8/30/2024 0010 by Pedro Valencia RN  Medication Review/Management: medications reviewed  Taken 8/29/2024 2212 by Pedro Valencia RN  Medication Review/Management: medications reviewed  Taken 8/29/2024 2020 by Pedro Valencia RN  Medication Review/Management: medications reviewed     Problem: Osteoarthritis Comorbidity  Goal: Maintenance of Osteoarthritis Symptom Control  Intervention: Maintain Osteoarthritis Symptom Control  Recent Flowsheet Documentation  Taken 8/30/2024 0604 by Pedro Valencia RN  Medication Review/Management: medications reviewed  Taken  8/30/2024 0408 by Pedro Valencia RN  Medication Review/Management: medications reviewed  Taken 8/30/2024 0208 by Pedro Valencia RN  Medication Review/Management: medications reviewed  Taken 8/30/2024 0010 by Pedro Valencia RN  Activity Management: bedrest  Medication Review/Management: medications reviewed  Taken 8/29/2024 2212 by Pedro Valencia RN  Medication Review/Management: medications reviewed  Taken 8/29/2024 2020 by Pedro Valencia RN  Activity Management: bedrest  Medication Review/Management: medications reviewed     Problem: Pain Chronic (Persistent) (Comorbidity Management)  Goal: Acceptable Pain Control and Functional Ability  Intervention: Manage Persistent Pain  Recent Flowsheet Documentation  Taken 8/30/2024 0604 by Pedro Valencia RN  Medication Review/Management: medications reviewed  Taken 8/30/2024 0408 by Pedro Valencia RN  Medication Review/Management: medications reviewed  Taken 8/30/2024 0208 by Pedro Valencia RN  Medication Review/Management: medications reviewed  Taken 8/30/2024 0010 by Pedro Valencia RN  Medication Review/Management: medications reviewed  Taken 8/29/2024 2212 by Pedro Valencia RN  Medication Review/Management: medications reviewed  Taken 8/29/2024 2020 by Pedro Valencia RN  Medication Review/Management: medications reviewed  Intervention: Develop Pain Management Plan  Recent Flowsheet Documentation  Taken 8/29/2024 2020 by Pedro Valencia RN  Pain Management Interventions: quiet environment facilitated  Intervention: Optimize Psychosocial Wellbeing  Recent Flowsheet Documentation  Taken 8/30/2024 0010 by Pedro Valencia RN  Diversional Activities: television  Taken 8/29/2024 2020 by Pedro Valencia RN  Diversional Activities: television     Problem: Skin Injury Risk Increased  Goal: Skin Health and Integrity  Intervention: Optimize Skin Protection  Recent Flowsheet Documentation  Taken 8/30/2024 0604 by Pedro Valencia  RN  Head of Bed (HOB) Positioning: HOB elevated  Taken 8/30/2024 0408 by Pedro Valencia RN  Head of Bed (HOB) Positioning: HOB elevated  Taken 8/30/2024 0208 by Pedro Valencia RN  Head of Bed (HOB) Positioning: HOB elevated  Taken 8/30/2024 0010 by Pedro Valencia RN  Pressure Reduction Techniques:   frequent weight shift encouraged   weight shift assistance provided  Head of Bed (HOB) Positioning: HOB elevated  Pressure Reduction Devices:   alternating pressure pump (ADD)   pressure-redistributing mattress utilized  Skin Protection: adhesive use limited  Taken 8/29/2024 2212 by Pedro Valencia RN  Head of Bed (HOB) Positioning: HOB elevated  Taken 8/29/2024 2020 by Pedro Valencia RN  Pressure Reduction Techniques:   frequent weight shift encouraged   weight shift assistance provided  Head of Bed (HOB) Positioning: HOB elevated  Pressure Reduction Devices:   alternating pressure pump (ADD)   pressure-redistributing mattress utilized  Skin Protection: adhesive use limited     Problem: Infection Progression (Sepsis/Septic Shock)  Goal: Absence of Infection Signs and Symptoms  Intervention: Initiate Sepsis Management  Recent Flowsheet Documentation  Taken 8/30/2024 0604 by Pedro Valencia RN  Infection Prevention:   hand hygiene promoted   rest/sleep promoted  Isolation Precautions: precautions maintained  Taken 8/30/2024 0408 by Pedro Valencia RN  Infection Prevention:   hand hygiene promoted   rest/sleep promoted  Isolation Precautions: precautions maintained  Taken 8/30/2024 0208 by Pedro Valencia RN  Infection Prevention:   hand hygiene promoted   single patient room provided   rest/sleep promoted  Isolation Precautions: precautions maintained  Taken 8/30/2024 0010 by Pedro Valencia RN  Infection Management: aseptic technique maintained  Infection Prevention:   hand hygiene promoted   rest/sleep promoted  Isolation Precautions: precautions maintained  Taken 8/29/2024 2212 by  Pedro Valencia RN  Infection Prevention:   hand hygiene promoted   personal protective equipment utilized   rest/sleep promoted  Isolation Precautions: precautions maintained  Taken 8/29/2024 2020 by Pedro Valencia RN  Infection Management: aseptic technique maintained  Infection Prevention:   hand hygiene promoted   rest/sleep promoted  Isolation Precautions: precautions maintained  Intervention: Promote Recovery  Recent Flowsheet Documentation  Taken 8/30/2024 0010 by Pedro Valencia RN  Activity Management: bedrest  Taken 8/29/2024 2020 by Pedro Valencia RN  Activity Management: bedrest     Problem: Fall Injury Risk  Goal: Absence of Fall and Fall-Related Injury  Intervention: Identify and Manage Contributors  Recent Flowsheet Documentation  Taken 8/30/2024 0604 by Pedro Valencia RN  Medication Review/Management: medications reviewed  Taken 8/30/2024 0408 by Pedro Valencia RN  Medication Review/Management: medications reviewed  Taken 8/30/2024 0208 by Pedro Valencia RN  Medication Review/Management: medications reviewed  Taken 8/30/2024 0010 by Pedro Valencia RN  Medication Review/Management: medications reviewed  Taken 8/29/2024 2212 by Pedro Valencia RN  Medication Review/Management: medications reviewed  Taken 8/29/2024 2020 by Pedro Valencia RN  Medication Review/Management: medications reviewed  Intervention: Promote Injury-Free Environment  Recent Flowsheet Documentation  Taken 8/30/2024 0604 by Pedro Valencia RN  Safety Promotion/Fall Prevention:   activity supervised   room organization consistent   safety round/check completed  Taken 8/30/2024 0408 by Pedro Valencia RN  Safety Promotion/Fall Prevention:   activity supervised   room organization consistent   safety round/check completed  Taken 8/30/2024 0208 by Pedro Valencia RN  Safety Promotion/Fall Prevention:   activity supervised   room organization consistent   safety round/check completed  Taken  8/30/2024 0010 by Pedro Valencia, RN  Safety Promotion/Fall Prevention:   activity supervised   room organization consistent   safety round/check completed  Taken 8/29/2024 2212 by Pedro Valencia, RN  Safety Promotion/Fall Prevention:   activity supervised   safety round/check completed   room organization consistent  Taken 8/29/2024 2020 by Pedro Valencia, RN  Safety Promotion/Fall Prevention:   activity supervised   room organization consistent   safety round/check completed

## 2024-08-30 NOTE — THERAPY TREATMENT NOTE
Patient Name: Caitlyn Longoria  : 1934    MRN: [id number]                              Today's Date: 2024       Admit Date: 2024    Visit Dx:     ICD-10-CM ICD-9-CM   1. Cellulitis of left leg  L03.116 682.6   2. Chronic anticoagulation  Z79.01 V58.61     Patient Active Problem List   Diagnosis    Neck and shoulder pain    Arthropathy of shoulder region    Carpal tunnel syndrome of left wrist    Chronic pain of both shoulders    Chronic left shoulder pain    Arthropathy of left shoulder    Dysarthria    Type 2 diabetes mellitus with atherosclerosis of aorta    Paroxysmal atrial fibrillation    HLD (hyperlipidemia)    Chronic bronchitis    Coronary artery disease    CVA (cerebral vascular accident)    HTN (hypertension)    CKD (chronic kidney disease), stage III    Chronic combined systolic (congestive) and diastolic (congestive) heart failure    Infection of prosthetic right knee joint    Stasis dermatitis of right lower extremity due to peripheral venous hypertension    Current use of long term anticoagulation    Morbid obesity    Acute respiratory failure with hypoxia    Rhinovirus    Asthma with acute exacerbation    Acute respiratory failure with hypoxemia    Viral pneumonia    Hypoxia    CLL (chronic lymphocytic leukemia)    Dyspnea    Anticoagulated on Coumadin    Osteoporotic compression fracture of spine    Pneumonia due to gram-negative bacteria    Pneumonia due to infectious organism    Bilateral carotid artery disease    Hypogammaglobulinemia    Hypogammaglobulinemia    Cellulitis of right lower extremity    Hypokalemia    Nocturnal hypoxia    COPD (chronic obstructive pulmonary disease)    Upper respiratory tract infection    COPD (chronic obstructive pulmonary disease)    Sick sinus syndrome    Anemia    Thrombocytopenia    Chronic HFrEF (heart failure with reduced ejection fraction)    Cellulitis of left lower leg     Past Medical History:   Diagnosis Date    Aortic calcification      mild, on echo 12/17/2017    Aortic regurgitation     Trace    Asthma     Atrial fibrillation     CAD (coronary artery disease)     Carpal tunnel syndrome of left wrist     Chronic combined systolic and diastolic congestive heart failure     CKD (chronic kidney disease) stage 3, GFR 30-59 ml/min     COPD (chronic obstructive pulmonary disease)     Coronary artery disease involving native coronary artery of native heart with angina pectoris     Disc degeneration, lumbar     Diverticulosis     DM type 2 (diabetes mellitus, type 2)     GERD (gastroesophageal reflux disease)     History of aneurysm     right femoral artery s/p LHC    History of blood transfusion     History of fracture     History of heart attack     History of vitamin D deficiency     Hyperlipidemia     Hypertension     Leukemia     Mild mitral regurgitation     Mitral annular calcification     12/8/2017- echo, moderate    Osteopenia     PAF (paroxysmal atrial fibrillation)     Peripheral neuropathy     Skin cancer     Left hand    Sleep apnea     bipap    SSS (sick sinus syndrome)     Stroke (cerebrum)     TIA (transient ischemic attack) 2017    Tricuspid regurgitation     Trace     Past Surgical History:   Procedure Laterality Date    BRONCHOSCOPY Bilateral 10/6/2020    Procedure: BRONCHOSCOPY with BILATERAL LUNG washings;  Surgeon: Juno Coburn MD;  Location: [facility] ENDOSCOPY;  Service: Pulmonary;  Laterality: Bilateral;  PRE: purulent bronchitis  POST: PURULENT BRONCHITIS    BRONCHOSCOPY Bilateral 10/9/2020    Procedure: BRONCHOSCOPY with washing;  Surgeon: Juno Coburn MD;  Location: [facility] ENDOSCOPY;  Service: Pulmonary;  Laterality: Bilateral;  pre/post - mucous plug      CARDIAC CATHETERIZATION      CARDIAC ELECTROPHYSIOLOGY PROCEDURE N/A 2/7/2020    Procedure: PPM generator change - dual  medtronic;  Surgeon: Kiel Field MD;  Location: [facility] CATH INVASIVE LOCATION;  Service: Cardiology;  Laterality: N/A;    CHOLECYSTECTOMY       CORONARY STENT PLACEMENT      ENDOSCOPY N/A 9/14/2022    Procedure: ESOPHAGOGASTRODUODENOSCOPY with 54fr main dilatation;  Surgeon: Enio Cota MD;  Location: [facility] ENDOSCOPY;  Service: Gastroenterology;  Laterality: N/A;  pre - dysphagia  post - s/p dilatation, watermelon stomach    HERNIA REPAIR      hital hernia    HYSTERECTOMY      PACEMAKER IMPLANTATION      REPLACEMENT TOTAL KNEE Bilateral       General Information       Row Name 08/30/24 1529          Physical Therapy Time and Intention    Document Type therapy note (daily note)  -     Mode of Treatment physical therapy  -EB       Row Name 08/30/24 1529          General Information    Patient Profile Reviewed yes  -EB     Existing Precautions/Restrictions fall  -EB       Row Name 08/30/24 1529          Cognition    Orientation Status (Cognition) oriented x 4  -EB       Row Name 08/30/24 1529          Safety Issues, Functional Mobility    Impairments Affecting Function (Mobility) endurance/activity tolerance;strength  -               User Key  (r) = Recorded By, (t) = Taken By, (c) = Cosigned By      Initials Name Provider Type    EB Diana Simmons PTA Physical Therapist Assistant                   Mobility       Row Name 08/30/24 1529          Bed Mobility    Supine-Sit [place] (Bed Mobility) standby assist  -     Assistive Device (Bed Mobility) bed rails;head of bed elevated  -       Row Name 08/30/24 1529          Sit-Stand Transfer    Sit-Stand [place] (Transfers) contact guard  -     Assistive Device (Sit-Stand Transfers) walker, front-wheeled  -       Row Name 08/30/24 1529          Gait/Stairs (Locomotion)    [place] Level (Gait) standby assist  -EB     Assistive Device (Gait) walker, 4-wheeled  -     Distance in Feet (Gait) --  2X40ft  -EB     Deviations/Abnormal Patterns (Gait) gait speed decreased;stride length decreased;jeremi decreased  -EB     Bilateral Gait Deviations forward flexed posture;heel strike  decreased  -EB     Comment, (Gait/Stairs) slow gait pace. Increased forward flexed posture as pt fatigues. Will lean on rollator with forearms  -EB               User Key  (r) = Recorded By, (t) = Taken By, (c) = Cosigned By      Initials Name Provider Type    Diana Brand PTA Physical Therapist Assistant                   Obj/Interventions    No documentation.                  Goals/Plan    No documentation.                  Clinical Impression       Row Name 08/30/24 1532          Pain    Pretreatment Pain Rating 0/10 - no pain  -EB       Row Name 08/30/24 1532          Plan of Care Review    Plan of Care Reviewed With patient  -EB     Progress improving  -EB     Outcome Evaluation Pt seen for PT tx today. Pt progressing with mobility and reports feeling much better. Pt required SBA with bed mobility and CGA with stands. Pt ambulated 2X40ft with rollator SBA. Pt cued for posture as pt leans onto rollator on forearms when fatigued. Pt ambulated back to room and sat on rollator for ADLS with OT. Will continue to follow and progress pt as able.  -       Row Name 08/30/24 1532          Therapy Assessment/Plan (PT)    Therapy Frequency (PT) 6 times/wk  -       Row Name 08/30/24 1532          Positioning and Restraints    Pre-Treatment Position in bed  -EB     Post Treatment Position other  -EB     Other Position with OT  sitting on rollator seat by sink with OT.  -EB               User Key  (r) = Recorded By, (t) = Taken By, (c) = Cosigned By      Initials Name Provider Type    Diana Brand PTA Physical Therapist Assistant                   Outcome Measures       Row Name 08/30/24 1535 08/30/24 0815       How much help from another person do you currently need...    Turning from your back to your side while in flat bed without using bedrails? 3  -EB 3  -JK    Moving from lying on back to sitting on the side of a flat bed without bedrails? 3  -EB 3  -JK    Moving to and from a bed to a chair (including a  wheelchair)? 3  -EB 3  -JK    Standing up from a chair using your arms (e.g., wheelchair, bedside chair)? 3  -EB 2  -JK    Climbing 3-5 steps with a railing? 1  -EB 2  -JK    To walk in hospital room? 3  -EB 3  -JK    AM-PAC 6 Clicks Score (PT) 16  -EB 16  -JK    Highest Level of Mobility Goal 5 --> Static standing  -EB 5 --> Static standing  -JK              User Key  (r) = Recorded By, (t) = Taken By, (c) = Cosigned By      Initials Name Provider Type    EB Diana Simmons PTA Physical Therapist Assistant    Deborah Bowers RN Registered Nurse                                 Physical Therapy Education       Title: PT OT SLP Therapies (In Progress)       Topic: Physical Therapy (In Progress)       Point: Mobility training (In Progress)       Learning Progress Summary             Patient Acceptance, E,D, NR by  at 8/30/2024 1535    Acceptance, E, VU,DU by  at 8/28/2024 1542    Acceptance, E,D, DU by  at 8/27/2024 1156                         Point: Home exercise program (Done)       Learning Progress Summary             Patient Acceptance, E,D, DU by  at 8/27/2024 1156                         Point: Body mechanics (In Progress)       Learning Progress Summary             Patient Acceptance, E,D, NR by  at 8/30/2024 1535    Acceptance, E, VU,DU by  at 8/28/2024 1542    Acceptance, E,D, DU by  at 8/27/2024 1156                         Point: Precautions (Done)       Learning Progress Summary             Patient Acceptance, E, VU,DU by  at 8/28/2024 1542    Acceptance, E,D, DU by  at 8/27/2024 1156                                         User Key       Initials Effective Dates Name Provider Type Discipline     06/16/21 -  Deepa Thomas PT Physical Therapist PT     02/14/23 -  Diana Simmons PTA Physical Therapist Assistant PT     06/16/21 -  Lisy Bautista PTA Physical Therapist Assistant PT                  PT Recommendation and Plan     Plan of Care Reviewed With:  patient  Progress: improving  Outcome Evaluation: Pt seen for PT tx today. Pt progressing with mobility and reports feeling much better. Pt required SBA with bed mobility and CGA with stands. Pt ambulated 2X40ft with rollator SBA. Pt cued for posture as pt leans onto rollator on forearms when fatigued. Pt ambulated back to room and sat on rollator for ADLS with OT. Will continue to follow and progress pt as able.     Time Calculation:         PT Charges       Row Name 08/30/24 1528             Time Calculation    Start Time 1445  -EB      Stop Time 1500  -EB      Time Calculation (min) 15 min  -EB      PT Received On 08/30/24  -EB      PT - Next Appointment 08/31/24  -EB         Time Calculation- PT    Total Timed Code Minutes- PT 15 minute(s)  -EB                User Key  (r) = Recorded By, (t) = Taken By, (c) = Cosigned By      Initials Name Provider Type    EB Diana Simmons PTA Physical Therapist Assistant                  Therapy Charges for Today       Code Description Service Date Service Provider Modifiers Qty    [id number] HC GAIT TRAINING EA 15 MIN 8/30/2024 Diana Simmons PTA GP 1            PT G-Codes  Outcome Measure Options: AM-PAC 6 Clicks Basic Mobility (PT)  AM-PAC 6 Clicks Score (PT): 16  AM-PAC 6 Clicks Score (OT): 15       Diana Simmons PTA  8/30/2024

## 2024-08-30 NOTE — CASE MANAGEMENT/SOCIAL WORK
Continued Stay Note  [facility]     Patient Name: Caitlyn Longoria  MRN: [id number]  Today's Date: 8/30/2024    Admit Date: 8/26/2024    Plan: Plan [facility] for skilled care.  KENNETH Recinos RN   Discharge Plan       Row Name 08/30/24 0757       Plan    Plan Plan [facility] for skilled care.  KENNETH Recinos RN    Patient/Family in Agreement with Plan yes    Plan Comments Spoke with pt at bedside.  Pt states she now feels she will need skilled care at discharge.  [facility]  ( [phone]) called to follow.  Plan [facility] for skilled care.  KENNETH Recinos RN                   Discharge Codes    No documentation.                 Expected Discharge Date and Time       Expected Discharge Date Expected Discharge Time    Aug 30, 2024               Narda Recinos RN

## 2024-08-30 NOTE — PLAN OF CARE
Goal Outcome Evaluation:  Plan of Care Reviewed With: patient        Progress: improving  Outcome Evaluation: Pt demo's improvement in mobility and ADL participation today. SBA-CGA for fxl ambulation using rollator household distances. Sits at sink to complete grooming ADLs with set-up/spv. Fatigued after session, TF to chair to rest. OT will cont to follow

## 2024-08-30 NOTE — THERAPY TREATMENT NOTE
Patient Name: Caitlyn Longoria  : 1934    MRN: [id number]                              Today's Date: 2024       Admit Date: 2024    Visit Dx:     ICD-10-CM ICD-9-CM   1. Cellulitis of left leg  L03.116 682.6   2. Chronic anticoagulation  Z79.01 V58.61     Patient Active Problem List   Diagnosis    Neck and shoulder pain    Arthropathy of shoulder region    Carpal tunnel syndrome of left wrist    Chronic pain of both shoulders    Chronic left shoulder pain    Arthropathy of left shoulder    Dysarthria    Type 2 diabetes mellitus with atherosclerosis of aorta    Paroxysmal atrial fibrillation    HLD (hyperlipidemia)    Chronic bronchitis    Coronary artery disease    CVA (cerebral vascular accident)    HTN (hypertension)    CKD (chronic kidney disease), stage III    Chronic combined systolic (congestive) and diastolic (congestive) heart failure    Infection of prosthetic right knee joint    Stasis dermatitis of right lower extremity due to peripheral venous hypertension    Current use of long term anticoagulation    Morbid obesity    Acute respiratory failure with hypoxia    Rhinovirus    Asthma with acute exacerbation    Acute respiratory failure with hypoxemia    Viral pneumonia    Hypoxia    CLL (chronic lymphocytic leukemia)    Dyspnea    Anticoagulated on Coumadin    Osteoporotic compression fracture of spine    Pneumonia due to gram-negative bacteria    Pneumonia due to infectious organism    Bilateral carotid artery disease    Hypogammaglobulinemia    Hypogammaglobulinemia    Cellulitis of right lower extremity    Hypokalemia    Nocturnal hypoxia    COPD (chronic obstructive pulmonary disease)    Upper respiratory tract infection    COPD (chronic obstructive pulmonary disease)    Sick sinus syndrome    Anemia    Thrombocytopenia    Chronic HFrEF (heart failure with reduced ejection fraction)    Cellulitis of left lower leg     Past Medical History:   Diagnosis Date    Aortic calcification      mild, on echo 12/17/2017    Aortic regurgitation     Trace    Asthma     Atrial fibrillation     CAD (coronary artery disease)     Carpal tunnel syndrome of left wrist     Chronic combined systolic and diastolic congestive heart failure     CKD (chronic kidney disease) stage 3, GFR 30-59 ml/min     COPD (chronic obstructive pulmonary disease)     Coronary artery disease involving native coronary artery of native heart with angina pectoris     Disc degeneration, lumbar     Diverticulosis     DM type 2 (diabetes mellitus, type 2)     GERD (gastroesophageal reflux disease)     History of aneurysm     right femoral artery s/p LHC    History of blood transfusion     History of fracture     History of heart attack     History of vitamin D deficiency     Hyperlipidemia     Hypertension     Leukemia     Mild mitral regurgitation     Mitral annular calcification     12/8/2017- echo, moderate    Osteopenia     PAF (paroxysmal atrial fibrillation)     Peripheral neuropathy     Skin cancer     Left hand    Sleep apnea     bipap    SSS (sick sinus syndrome)     Stroke (cerebrum)     TIA (transient ischemic attack) 2017    Tricuspid regurgitation     Trace     Past Surgical History:   Procedure Laterality Date    BRONCHOSCOPY Bilateral 10/6/2020    Procedure: BRONCHOSCOPY with BILATERAL LUNG washings;  Surgeon: Juno Coburn MD;  Location: [facility] ENDOSCOPY;  Service: Pulmonary;  Laterality: Bilateral;  PRE: purulent bronchitis  POST: PURULENT BRONCHITIS    BRONCHOSCOPY Bilateral 10/9/2020    Procedure: BRONCHOSCOPY with washing;  Surgeon: Juno Coburn MD;  Location: [facility] ENDOSCOPY;  Service: Pulmonary;  Laterality: Bilateral;  pre/post - mucous plug      CARDIAC CATHETERIZATION      CARDIAC ELECTROPHYSIOLOGY PROCEDURE N/A 2/7/2020    Procedure: PPM generator change - dual  medtronic;  Surgeon: Kiel Field MD;  Location: [facility] CATH INVASIVE LOCATION;  Service: Cardiology;  Laterality: N/A;    CHOLECYSTECTOMY       CORONARY STENT PLACEMENT      ENDOSCOPY N/A 9/14/2022    Procedure: ESOPHAGOGASTRODUODENOSCOPY with 54fr main dilatation;  Surgeon: Enio Cota MD;  Location: [facility] ENDOSCOPY;  Service: Gastroenterology;  Laterality: N/A;  pre - dysphagia  post - s/p dilatation, watermelon stomach    HERNIA REPAIR      hital hernia    HYSTERECTOMY      PACEMAKER IMPLANTATION      REPLACEMENT TOTAL KNEE Bilateral       General Information       Row Name 08/30/24 1611          OT Time and Intention    Document Type therapy note (daily note)  -     Mode of Treatment occupational therapy  -       Row Name 08/30/24 1611          General Information    Patient Profile Reviewed yes  -     Existing Precautions/Restrictions fall  -       Row Name 08/30/24 1611          Cognition    Orientation Status (Cognition) oriented x 4  -[facility] Name 08/30/24 1611          Safety Issues, Functional Mobility    Impairments Affecting Function (Mobility) endurance/activity tolerance;strength  -               User Key  (r) = Recorded By, (t) = Taken By, (c) = Cosigned By      Initials Name Provider Type     Pricilla Salamanca OT Occupational Therapist                     Mobility/ADL's       Row Name 08/30/24 1611          Bed Mobility    Bed Mobility supine-sit  -     Supine-Sit Peck (Bed Mobility) standby assist  -     Assistive Device (Bed Mobility) bed rails;head of bed elevated  -     Comment, (Bed Mobility) UIC at end of session  -[facility] Name 08/30/24 1611          Transfers    Transfers sit-stand transfer  -[facility] Name 08/30/24 1611          Sit-Stand Transfer    Sit-Stand Peck (Transfers) contact guard  -     Assistive Device (Sit-Stand Transfers) walker, front-wheeled  -[facility] Name 08/30/24 1611          Functional Mobility    Functional Mobility- Comment SBA-CGA using rollator  -[facility] Name 08/30/24 1611          Activities of Daily Living    BADL Assessment/Intervention grooming   -       Row Name 08/30/24 1611          Grooming Assessment/Training    [place] Level (Grooming) grooming skills;oral care regimen;wash face, hands;set up  -     Oral Care teeth brushed - regular toothbrush  -     Position (Grooming) sink side;unsupported sitting  -     Comment, (Grooming) sitting to complete grooming ADLs for energy conservation  -               User Key  (r) = Recorded By, (t) = Taken By, (c) = Cosigned By      Initials Name Provider Type    Pricilla Olson OT Occupational Therapist                   Obj/Interventions       Row Name 08/30/24 1612          Balance    Static Sitting Balance supervision  -     Dynamic Sitting Balance supervision  -     Position, Sitting Balance sitting edge of bed  -     Static Standing Balance standby assist  -     Dynamic Standing Balance contact guard  -     Position/Device Used, Standing Balance walker, 4-wheeled  -     Balance Interventions sitting;standing;occupation based/functional task  -               User Key  (r) = Recorded By, (t) = Taken By, (c) = Cosigned By      Initials Name Provider Type     Pricilla Salamanca OT Occupational Therapist                   Goals/Plan    No documentation.                  Clinical Impression       Row Name 08/30/24 1612          Pain Assessment    Pretreatment Pain Rating 0/10 - no pain  -       Row Name 08/30/24 1612          Plan of Care Review    Plan of Care Reviewed With patient  -     Progress improving  -     Outcome Evaluation Pt demo's improvement in mobility and ADL participation today. SBA-CGA for fxl ambulation using rollator household distances. Sits at sink to complete grooming ADLs with set-up/spv. Fatigued after session, TF to chair to rest. OT will cont to follow  -       Row Name 08/30/24 1612          Positioning and Restraints    Pre-Treatment Position in bed  -     Post Treatment Position chair  -JW     In Chair call light within reach;sitting;encouraged to call  for assist;exit alarm on;legs elevated  -               User Key  (r) = Recorded By, (t) = Taken By, (c) = Cosigned By      Initials Name Provider Type    Pricilla Olson OT Occupational Therapist                   Outcome Measures       Row Name 08/30/24 1535 08/30/24 0815       How much help from another person do you currently need...    Turning from your back to your side while in flat bed without using bedrails? 3  -EB 3  -JK    Moving from lying on back to sitting on the side of a flat bed without bedrails? 3  -EB 3  -JK    Moving to and from a bed to a chair (including a wheelchair)? 3  -EB 3  -JK    Standing up from a chair using your arms (e.g., wheelchair, bedside chair)? 3  -EB 2  -JK    Climbing 3-5 steps with a railing? 1  -EB 2  -JK    To walk in hospital room? 3  -EB 3  -JK    AM-PAC 6 Clicks Score (PT) 16  -EB 16  -JK    Highest Level of Mobility Goal 5 --> Static standing  -EB 5 --> Static standing  -JK              User Key  (r) = Recorded By, (t) = Taken By, (c) = Cosigned By      Initials Name Provider Type    Diana Brand PTA Physical Therapist Assistant    Deborah Bowers RN Registered Nurse                    Occupational Therapy Education       Title: PT OT SLP Therapies (In Progress)       Topic: Occupational Therapy (In Progress)       Point: ADL training (Done)       Description:   Instruct learner(s) on proper safety adaptation and remediation techniques during self care or transfers.   Instruct in proper use of assistive devices.                  Learning Progress Summary             Patient Acceptance, E, VU by PP at 8/27/2024 1225    Comment: Pt Ed on OT role, and dc planning. Pt also encouraged to participate in OOB adls to improve overall fxn.                         Point: Home exercise program (Not Started)       Description:   Instruct learner(s) on appropriate technique for monitoring, assisting and/or progressing therapeutic exercises/activities.                   Learner Progress:  Not documented in this visit.              Point: Precautions (Not Started)       Description:   Instruct learner(s) on prescribed precautions during self-care and functional transfers.                  Learner Progress:  Not documented in this visit.              Point: Body mechanics (Not Started)       Description:   Instruct learner(s) on proper positioning and spine alignment during self-care, functional mobility activities and/or exercises.                  Learner Progress:  Not documented in this visit.                              User Key       Initials Effective Dates Name Provider Type Discipline     06/09/23 -  Pinky Pteit OT Occupational Therapist OT                  OT Recommendation and Plan     Plan of Care Review  Plan of Care Reviewed With: patient  Progress: improving  Outcome Evaluation: Pt feder[place]'s improvement in mobility and ADL participation today. SBA-CGA for fxl ambulation using rollator household distances. Sits at sink to complete grooming ADLs with set-up/spv. Fatigued after session, TF to chair to rest. OT will cont to follow     Time Calculation:         Time Calculation- OT       Row Name 08/30/24 1614             Time Calculation-     OT Start Time 1447  -      OT Stop Time 1510  -      OT Time Calculation (min) 23 min  -      Total Timed Code Minutes- OT 19 minute(s)  -      OT Non-Billable Time (min) 4 min  -      OT Received On 08/30/24  -      OT - Next Appointment 09/03/24  -         Timed Charges    26303 - OT Self Care/Mgmt Minutes 19  -         Total Minutes    Timed Charges Total Minutes 19  -       Total Minutes 19  -                User Key  (r) = Recorded By, (t) = Taken By, (c) = Cosigned By      Initials Name Provider Type    JW Pricilla Salamanca, OT Occupational Therapist                  Therapy Charges for Today       Code Description Service Date Service Provider Modifiers Qty    [id number]  OT SELF CARE/MGMT/TRAIN EA 15  MIN 8/30/2024 Pricilla Salamanca, OT GO 1                 Pricilla Salamanca OT  8/30/2024

## 2024-08-30 NOTE — PROGRESS NOTES
[facility] Clinical Pharmacy Services: Vancomycin Level Monitoring Note    Caitlyn Longoria is a 89 y.o. female who is on day 3/7 of pharmacy to dose vancomycin for Skin and Soft Tissue.    Estimated Creatinine Clearance: 48.5 mL/min (by C-G formula based on SCr of 0.79 mg/dL).    Current Vanc Dose: 1000 mg IV every  24  hours  Results from last 7 days   Lab Units 08/29/24  2139 08/27/24  0540   VANCOMYCIN RM mcg/mL  --  25.60   VANCOMYCIN TR mcg/mL 10.80  --    Predicted AUC at current dose:413 mg/L.hr  Next Level Date and Time: Will defer ordering Vancomycin Trough to the Clinical Pharmacist in the morning.    No changes at this time. Pharmacy is continuing to monitor and will adjust as needed.    Yannick Mccain, [facility]  Clinical Pharmacist

## 2024-08-30 NOTE — PROGRESS NOTES
Name: Caitlyn Longoria ADMIT: 2024   : 1934  PCP: Zenaida Suarez MD    MRN: [id number] LOS: 3 days   AGE/SEX: 89 y.o. female  ROOM: [facility]     Subjective   Subjective     Wbc is up slightly and her bp was low earlier this morning. She denied any new symptoms. Her leg looks a little better with slowly retreating erythema and her edema is very slightly improved in her foot in comparison to yesterday. Still hot to the touch.        Objective   Objective   Vital Signs  Temp:  [97.3 °F (36.3 °C)-98.2 °F (36.8 °C)] 98.2 °F (36.8 °C)  Heart Rate:  [80-82] 80  Resp:  [18] 18  BP: ()/(57-65) 103/61  SpO2:  [92 %-100 %] 100 %  on   ;   Device (Oxygen Therapy): room air  Body mass index is 32.81 kg/m².  Physical Exam  Constitutional:       General: She is not in acute distress.     Appearance: She is not toxic-appearing.   Cardiovascular:      Rate and Rhythm: Normal rate and regular rhythm.      Heart sounds: Normal heart sounds.   Pulmonary:      Effort: Pulmonary effort is normal.      Breath sounds: Normal breath sounds.   Abdominal:      General: Bowel sounds are normal.      Palpations: Abdomen is soft.   Musculoskeletal:      Right lower leg: No edema.      Left lower leg: Edema present.   Skin:     Findings: Erythema and rash present.   Neurological:      Mental Status: She is alert.   Psychiatric:         Mood and Affect: Mood normal.         Behavior: Behavior normal.         Results Review     I reviewed the patient's new clinical results.  Results from last 7 days   Lab Units 24  0723 24  0303 24  0432 24  0540   WBC 10*3/mm3 21.30* 16.17* 15.30* 17.29*   HEMOGLOBIN g/dL 12.1 11.2* 11.2* 11.0*   PLATELETS 10*3/mm3 209 174 155 144     Results from last 7 days   Lab Units 24  0723 24  0303 24  1850 24  0432 24  0540   SODIUM mmol/L 139 139  --  137 136   POTASSIUM mmol/L 4.1 4.1 4.1 3.3* 3.7   CHLORIDE mmol/L 105 107  --  103  102   CO2 mmol/L 27.0 23.7  --  23.0 23.1   BUN mg/dL 14 16  --  13 16   CREATININE mg/dL 0.74 0.79  --  0.74 0.83   GLUCOSE mg/dL 157* 143*  --  132* 80   Estimated Creatinine Clearance: 52.9 mL/min (by C-G formula based on SCr of 0.74 mg/dL).  Results from last 7 days   Lab Units 08/26/24  1139 08/24/24  0537   ALBUMIN g/dL 4.1 3.2*   BILIRUBIN mg/dL 2.5* 1.4*   ALK PHOS U/L 218* 90   AST (SGOT) U/L 217* 22   ALT (SGPT) U/L 175* 15     Results from last 7 days   Lab Units 08/30/24  0723 08/29/24  0303 08/28/24  0432 08/27/24  0540 08/26/24  1139 08/25/24  0655 08/24/24  0537   CALCIUM mg/dL 8.1* 8.0* 8.0* 8.5* 10.3 8.9 8.5*   ALBUMIN g/dL  --   --   --   --  4.1  --  3.2*   MAGNESIUM mg/dL  --   --   --   --   --  2.1  --    PHOSPHORUS mg/dL  --   --   --   --   --  3.3  --      Results from last 7 days   Lab Units 08/26/24  1139   LACTATE mmol/L 1.6     COVID19   Date Value Ref Range Status   08/22/2024 Not Detected Not Detected - Ref. Range Final   11/09/2020 Not Detected Not Detected - Ref. Range Final   10/01/2020 Not Detected Not Detected - Ref. Range Final   08/29/2020 Not Detected Not Detected - Ref. Range Final     SARS-CoV-2 PCR   Date Value Ref Range Status   09/29/2020 Not Detected Not Detected Final     Comment:     Nucleic acid specific to SARS-CoV-2 (COVID-19) virus was not detected inthis sample by the TaqPath (TM) COVID-19 Combo Kit.SARS-CoV-2 (COVID-19) nucleic acid testing performed using [employer] Aptima (R) SARS-CoV-2 Assay or [employer] TaqPath   (TM)COVID-19 Combo Kit as indicated above under Emergency Use Authorization (EUA) from the FDA. Aptima (R) and TaqPath (TM) test performancecharacteristics verified by [facility] in accordance with the FDAs Guidance Document (Policy for Diagnostic   Tests for Coronavirus Disease-2019during the Public Health Emergency) issued on March 16, 2020. The laboratory is regulated under CLIA as qualified to perform high-complexity testingUnless  otherwise noted, all testing was performed at [facility],   [address]. [id number], [place] State Licensee No. [id number]     Glucose   Date/Time Value Ref Range Status   08/30/2024 1605 133 (H) 70 - 130 mg/dL Final   08/30/2024 1100 209 (H) 70 - 130 mg/dL Final       Duplex Venous Lower Extremity - Left    Normal left lower extremity venous duplex scan.    Scheduled Medications  albuterol, 2.5 mg, Nebulization, Q4H  budesonide, 0.5 mg, Nebulization, BID  cefepime, 1,000 mg, Intravenous, Q12H  cetirizine, 10 mg, Oral, Nightly  fluticasone, 1 spray, Nasal, Daily  [Held by provider] furosemide, 20 mg, Oral, Daily  insulin lispro, 2-7 Units, Subcutaneous, 4x Daily AC & at Bedtime  metoprolol tartrate, 12.5 mg, Oral, Q12H  montelukast, 10 mg, Oral, Nightly  oxybutynin XL, 10 mg, Oral, BID  rosuvastatin, 20 mg, Oral, Nightly  sodium chloride, 10 mL, Intravenous, Q12H  sodium chloride, 4 mL, Nebulization, BID - RT  vancomycin, 1,000 mg, Intravenous, Q24H  warfarin, 2 mg, Oral, Once per day on Tuesday Thursday  warfarin, 4 mg, Oral, Once per day on Sunday Monday Wednesday Friday Saturday    Infusions  Pharmacy to dose vancomycin,   Pharmacy to dose warfarin,     Diet  Diet: Cardiac, Diabetic; Healthy Heart (2-3 Na+); Consistent Carbohydrate; Fluid Consistency: Thin (IDDSI 0)       Assessment/Plan     Active Hospital Problems    Diagnosis  POA    **Cellulitis of left lower leg [L03.116]  Yes    COPD (chronic obstructive pulmonary disease) [J44.9]  Yes    Hypokalemia [E87.6]  Yes    Anticoagulated on Coumadin [Z79.01]  Not Applicable    CLL (chronic lymphocytic leukemia) [C91.10]  Yes    Morbid obesity [E66.01]  Yes    Chronic combined systolic (congestive) and diastolic (congestive) heart failure [I50.42]  Yes    HTN (hypertension) [I10]  Yes    Coronary artery disease [I25.10]  Yes    HLD (hyperlipidemia) [E78.5]  Yes    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       89 y.o. female admitted with Cellulitis of left lower leg.    LLE Cellulitis  - in a diabetic patient with hypogammaglobulinemia, improving slowly  - continue IV vancomycin and cefepime given that she is chronically on keflex  - blood cultures NGTD  - venous doppler showed no DVT     HTN/CAD/PAF/Chronic Combined Systolic and Diastolic CHF  - BP, HR, and volume states acceptable, no anginal symptoms  - bp has been on the low side today so will change coreg to metoprolol  - continue lasix  - may need to consider midodrine   - on AC with warfarin, pharmacy dosing     Type 2 DM  - BG at goal  - holding metformin and glipizide  - continue coverage with ssi/hypoglycemia protocol     COPD  - no exacerbation  - continue current regimen, add saline nebs and mucinex to help with secretions     CLL  - has chronic leukocytosis usually 14-18k  - up slightly to 21.3k today    Warfarin (home med) for DVT prophylaxis.  Full code.  Discussed with patient and nursing staff.  Anticipate discharge home with home health in 2-3 days.      Amaury Messer MD  [facility]  08/30/24  16:34 EDT     I wore protective equipment throughout this patient encounter including a face mask, gloves and protective eyewear.  Hand hygiene was performed before donning protective equipment and after removal when leaving the room.

## 2024-08-30 NOTE — PLAN OF CARE
Goal Outcome Evaluation:  Plan of Care Reviewed With: patient        Progress: improving  Outcome Evaluation: Pt seen for PT tx today. Pt progressing with mobility and reports feeling much better. Pt required SBA with bed mobility and CGA with stands. Pt ambulated 2X40ft with rollator SBA. Pt cued for posture as pt leans onto rollator on forearms when fatigued. Pt ambulated back to room and sat on rollator for ADLS with OT. Will continue to follow and progress pt as able.

## 2024-08-31 LAB
ANION GAP SERPL CALCULATED.3IONS-SCNC: 8 MMOL/L (ref 5–15)
BACTERIA SPEC AEROBE CULT: NORMAL
BACTERIA SPEC AEROBE CULT: NORMAL
BASOPHILS # BLD MANUAL: 0 10*3/MM3 (ref 0–0.2)
BASOPHILS NFR BLD MANUAL: 0 % (ref 0–1.5)
BUN SERPL-MCNC: 13 MG/DL (ref 8–23)
BUN/CREAT SERPL: 18.3 (ref 7–25)
CALCIUM SPEC-SCNC: 8.1 MG/DL (ref 8.6–10.5)
CHLORIDE SERPL-SCNC: 105 MMOL/L (ref 98–107)
CO2 SERPL-SCNC: 28 MMOL/L (ref 22–29)
CREAT SERPL-MCNC: 0.71 MG/DL (ref 0.57–1)
DEPRECATED RDW RBC AUTO: 45.6 FL (ref 37–54)
EGFRCR SERPLBLD CKD-EPI 2021: 81.4 ML/MIN/1.73
EOSINOPHIL # BLD MANUAL: 0 10*3/MM3 (ref 0–0.4)
EOSINOPHIL NFR BLD MANUAL: 0 % (ref 0.3–6.2)
ERYTHROCYTE [DISTWIDTH] IN BLOOD BY AUTOMATED COUNT: 12.6 % (ref 12.3–15.4)
GLUCOSE BLDC GLUCOMTR-MCNC: 120 MG/DL (ref 70–130)
GLUCOSE BLDC GLUCOMTR-MCNC: 187 MG/DL (ref 70–130)
GLUCOSE BLDC GLUCOMTR-MCNC: 193 MG/DL (ref 70–130)
GLUCOSE BLDC GLUCOMTR-MCNC: 239 MG/DL (ref 70–130)
GLUCOSE SERPL-MCNC: 126 MG/DL (ref 65–99)
HCT VFR BLD AUTO: 35.2 % (ref 34–46.6)
HGB BLD-MCNC: 11.2 G/DL (ref 12–15.9)
INR PPP: 2.86 (ref 0.9–1.1)
LYMPHOCYTES # BLD MANUAL: 9.11 10*3/MM3 (ref 0.7–3.1)
LYMPHOCYTES NFR BLD MANUAL: 3 % (ref 5–12)
MCH RBC QN AUTO: 31.8 PG (ref 26.6–33)
MCHC RBC AUTO-ENTMCNC: 31.8 G/DL (ref 31.5–35.7)
MCV RBC AUTO: 100 FL (ref 79–97)
MONOCYTES # BLD: 0.55 10*3/MM3 (ref 0.1–0.9)
NEUTROPHILS # BLD AUTO: 8.56 10*3/MM3 (ref 1.7–7)
NEUTROPHILS NFR BLD MANUAL: 47 % (ref 42.7–76)
PLAT MORPH BLD: NORMAL
PLATELET # BLD AUTO: 222 10*3/MM3 (ref 140–450)
PMV BLD AUTO: 9.6 FL (ref 6–12)
POIKILOCYTOSIS BLD QL SMEAR: ABNORMAL
POTASSIUM SERPL-SCNC: 3.8 MMOL/L (ref 3.5–5.2)
PROTHROMBIN TIME: 30.2 SECONDS (ref 11.7–14.2)
RBC # BLD AUTO: 3.52 10*6/MM3 (ref 3.77–5.28)
SMUDGE CELLS BLD QL SMEAR: ABNORMAL
SODIUM SERPL-SCNC: 141 MMOL/L (ref 136–145)
VARIANT LYMPHS NFR BLD MANUAL: 50 % (ref 19.6–45.3)
WBC NRBC COR # BLD AUTO: 18.21 10*3/MM3 (ref 3.4–10.8)

## 2024-08-31 PROCEDURE — 85025 COMPLETE CBC W/AUTO DIFF WBC: CPT | Performed by: INTERNAL MEDICINE

## 2024-08-31 PROCEDURE — 63710000001 INSULIN LISPRO (HUMAN) PER 5 UNITS: Performed by: STUDENT IN AN ORGANIZED HEALTH CARE EDUCATION/TRAINING PROGRAM

## 2024-08-31 PROCEDURE — 85007 BL SMEAR W/DIFF WBC COUNT: CPT | Performed by: INTERNAL MEDICINE

## 2024-08-31 PROCEDURE — 85610 PROTHROMBIN TIME: CPT | Performed by: INTERNAL MEDICINE

## 2024-08-31 PROCEDURE — 25810000003 SODIUM CHLORIDE 0.9 % SOLUTION 250 ML FLEX CONT: Performed by: STUDENT IN AN ORGANIZED HEALTH CARE EDUCATION/TRAINING PROGRAM

## 2024-08-31 PROCEDURE — 25010000002 CEFEPIME PER 500 MG: Performed by: INTERNAL MEDICINE

## 2024-08-31 PROCEDURE — 25010000002 VANCOMYCIN HCL 1.25 G RECONSTITUTED SOLUTION 1 EACH VIAL: Performed by: STUDENT IN AN ORGANIZED HEALTH CARE EDUCATION/TRAINING PROGRAM

## 2024-08-31 PROCEDURE — 80048 BASIC METABOLIC PNL TOTAL CA: CPT | Performed by: INTERNAL MEDICINE

## 2024-08-31 PROCEDURE — 94799 UNLISTED PULMONARY SVC/PX: CPT

## 2024-08-31 PROCEDURE — 94664 DEMO&/EVAL PT USE INHALER: CPT

## 2024-08-31 PROCEDURE — 94761 N-INVAS EAR/PLS OXIMETRY MLT: CPT

## 2024-08-31 PROCEDURE — 97530 THERAPEUTIC ACTIVITIES: CPT

## 2024-08-31 PROCEDURE — 82948 REAGENT STRIP/BLOOD GLUCOSE: CPT

## 2024-08-31 RX ADMIN — INSULIN LISPRO 2 UNITS: 100 INJECTION, SOLUTION INTRAVENOUS; SUBCUTANEOUS at 12:17

## 2024-08-31 RX ADMIN — INSULIN LISPRO 3 UNITS: 100 INJECTION, SOLUTION INTRAVENOUS; SUBCUTANEOUS at 18:10

## 2024-08-31 RX ADMIN — INSULIN LISPRO 2 UNITS: 100 INJECTION, SOLUTION INTRAVENOUS; SUBCUTANEOUS at 21:02

## 2024-08-31 RX ADMIN — ALBUTEROL SULFATE 2.5 MG: 2.5 SOLUTION RESPIRATORY (INHALATION) at 07:33

## 2024-08-31 RX ADMIN — Medication 10 ML: at 20:06

## 2024-08-31 RX ADMIN — ROSUVASTATIN CALCIUM 20 MG: 20 TABLET, FILM COATED ORAL at 20:06

## 2024-08-31 RX ADMIN — ALBUTEROL SULFATE 2.5 MG: 2.5 SOLUTION RESPIRATORY (INHALATION) at 23:17

## 2024-08-31 RX ADMIN — GUAIFENESIN 200 MG: 100 LIQUID ORAL at 19:27

## 2024-08-31 RX ADMIN — Medication 4 ML: at 19:20

## 2024-08-31 RX ADMIN — FUROSEMIDE 20 MG: 20 TABLET ORAL at 08:40

## 2024-08-31 RX ADMIN — ALBUTEROL SULFATE 2.5 MG: 2.5 SOLUTION RESPIRATORY (INHALATION) at 03:34

## 2024-08-31 RX ADMIN — CETIRIZINE HYDROCHLORIDE 10 MG: 10 TABLET ORAL at 20:06

## 2024-08-31 RX ADMIN — ALBUTEROL SULFATE 2.5 MG: 2.5 SOLUTION RESPIRATORY (INHALATION) at 15:40

## 2024-08-31 RX ADMIN — GUAIFENESIN 200 MG: 100 LIQUID ORAL at 10:02

## 2024-08-31 RX ADMIN — VANCOMYCIN HYDROCHLORIDE 1250 MG: 1.25 INJECTION, POWDER, LYOPHILIZED, FOR SOLUTION INTRAVENOUS at 18:09

## 2024-08-31 RX ADMIN — CEFEPIME 1000 MG: 1 INJECTION, POWDER, FOR SOLUTION INTRAMUSCULAR; INTRAVENOUS at 02:45

## 2024-08-31 RX ADMIN — BUDESONIDE 0.5 MG: 0.5 INHALANT RESPIRATORY (INHALATION) at 07:34

## 2024-08-31 RX ADMIN — WARFARIN 4 MG: 4 TABLET ORAL at 18:09

## 2024-08-31 RX ADMIN — METOPROLOL TARTRATE 12.5 MG: 25 TABLET, FILM COATED ORAL at 20:06

## 2024-08-31 RX ADMIN — OXYBUTYNIN CHLORIDE 10 MG: 10 TABLET, EXTENDED RELEASE ORAL at 20:06

## 2024-08-31 RX ADMIN — ALBUTEROL SULFATE 2.5 MG: 2.5 SOLUTION RESPIRATORY (INHALATION) at 19:15

## 2024-08-31 RX ADMIN — CEFEPIME 1000 MG: 1 INJECTION, POWDER, FOR SOLUTION INTRAMUSCULAR; INTRAVENOUS at 13:54

## 2024-08-31 RX ADMIN — FLUTICASONE PROPIONATE 1 SPRAY: 50 SPRAY, METERED NASAL at 10:02

## 2024-08-31 RX ADMIN — GUAIFENESIN 200 MG: 100 LIQUID ORAL at 15:04

## 2024-08-31 RX ADMIN — MONTELUKAST SODIUM 10 MG: 10 TABLET, FILM COATED ORAL at 20:06

## 2024-08-31 RX ADMIN — OXYBUTYNIN CHLORIDE 10 MG: 10 TABLET, EXTENDED RELEASE ORAL at 08:40

## 2024-08-31 RX ADMIN — Medication 4 ML: at 07:35

## 2024-08-31 RX ADMIN — BUDESONIDE 0.5 MG: 0.5 INHALANT RESPIRATORY (INHALATION) at 19:17

## 2024-08-31 RX ADMIN — Medication 10 ML: at 09:08

## 2024-08-31 RX ADMIN — ALBUTEROL SULFATE 2.5 MG: 2.5 SOLUTION RESPIRATORY (INHALATION) at 11:09

## 2024-08-31 NOTE — PLAN OF CARE
Goal Outcome Evaluation:   Patient alert follows commands prn pain med given. No nausea noted purewick changed this shift. Iv antibx given. No acute distress noted will continue to monitor

## 2024-08-31 NOTE — PLAN OF CARE
Goal Outcome Evaluation:              Outcome Evaluation: (P) Pts BP was a little low this morning, Metoprolol held due to parameters not being met. PT still has a cough, medications given as ordered. Pt has not complained of pain today. Edema in leg has gone down. Pt has a small opening on the back of her left leg that has bled a bit. VSS, V paced, room air.

## 2024-08-31 NOTE — PROGRESS NOTES
Name: Caitlyn Longoria ADMIT: 2024   : 1934  PCP: Zenaida Suarez MD    MRN: [id number] LOS: 4 days   AGE/SEX: 89 y.o. female  ROOM: [facility]     Subjective   Subjective     No events overnight. She's feeling pretty good. She slept well last night. She feels like her leg is improving       Objective   Objective   Vital Signs  Temp:  [97.3 °F (36.3 °C)-97.9 °F (36.6 °C)] 97.9 °F (36.6 °C)  Heart Rate:  [79-96] 80  Resp:  [16-24] 24  BP: ()/(58-82) 99/63  SpO2:  [92 %-100 %] 93 %  on  Flow (L/min):  [2-3] 3;   Device (Oxygen Therapy): nasal cannula  Body mass index is 32.81 kg/m².  Physical Exam  Constitutional:       General: She is not in acute distress.     Appearance: She is not toxic-appearing.   Cardiovascular:      Rate and Rhythm: Normal rate and regular rhythm.      Heart sounds: Normal heart sounds.   Pulmonary:      Effort: Pulmonary effort is normal.      Breath sounds: Normal breath sounds.   Abdominal:      General: Bowel sounds are normal.      Palpations: Abdomen is soft.   Musculoskeletal:      Right lower leg: No edema.      Left lower leg: Edema present.   Skin:     Findings: Erythema and rash present.      Comments: Left lower extremity erythema continues to retreat  Less warm to the touch today  Still fairly swollen   Neurological:      Mental Status: She is alert.   Psychiatric:         Mood and Affect: Mood normal.         Behavior: Behavior normal.         Results Review     I reviewed the patient's new clinical results.  Results from last 7 days   Lab Units 24  0529 24  0723 24  0303 24  0432   WBC 10*3/mm3 18.21* 21.30* 16.17* 15.30*   HEMOGLOBIN g/dL 11.2* 12.1 11.2* 11.2*   PLATELETS 10*3/mm3 222 209 174 155     Results from last 7 days   Lab Units 24  0530 24  0723 24  0303 24  1850 24  0432   SODIUM mmol/L 141 139 139  --  137   POTASSIUM mmol/L 3.8 4.1 4.1 4.1 3.3*   CHLORIDE mmol/L 105 105 107  --   103   CO2 mmol/L 28.0 27.0 23.7  --  23.0   BUN mg/dL 13 14 16  --  13   CREATININE mg/dL 0.71 0.74 0.79  --  0.74   GLUCOSE mg/dL 126* 157* 143*  --  132*   Estimated Creatinine Clearance: 55.1 mL/min (by C-G formula based on SCr of 0.71 mg/dL).  Results from last 7 days   Lab Units 08/26/24  1139   ALBUMIN g/dL 4.1   BILIRUBIN mg/dL 2.5*   ALK PHOS U/L 218*   AST (SGOT) U/L 217*   ALT (SGPT) U/L 175*     Results from last 7 days   Lab Units 08/31/24  0530 08/30/24  0723 08/29/24  0303 08/28/24  0432 08/27/24  0540 08/26/24  1139 08/25/24  0655   CALCIUM mg/dL 8.1* 8.1* 8.0* 8.0*   < > 10.3 8.9   ALBUMIN g/dL  --   --   --   --   --  4.1  --    MAGNESIUM mg/dL  --   --   --   --   --   --  2.1   PHOSPHORUS mg/dL  --   --   --   --   --   --  3.3    < > = values in this interval not displayed.     Results from last 7 days   Lab Units 08/26/24  1139   LACTATE mmol/L 1.6     COVID19   Date Value Ref Range Status   08/22/2024 Not Detected Not Detected - Ref. Range Final   11/09/2020 Not Detected Not Detected - Ref. Range Final   10/01/2020 Not Detected Not Detected - Ref. Range Final   08/29/2020 Not Detected Not Detected - Ref. Range Final     SARS-CoV-2 PCR   Date Value Ref Range Status   09/29/2020 Not Detected Not Detected Final     Comment:     Nucleic acid specific to SARS-CoV-2 (COVID-19) virus was not detected inthis sample by the TaqPath (TM) COVID-19 Combo Kit.SARS-CoV-2 (COVID-19) nucleic acid testing performed using [employer] Aptima (R) SARS-CoV-2 Assay or Doximity TaqPath   (TM)COVID-19 Combo Kit as indicated above under Emergency Use Authorization (EUA) from the FDA. Aptima (R) and TaqPath (TM) test performancecharacteristics verified by [employer] in accordance with the FDAs Guidance Document (Policy for Diagnostic   Tests for Coronavirus Disease-2019during the Public Health Emergency) issued on March 16, 2020. The laboratory is regulated under CLIA as qualified to perform  high-complexity testingUnless otherwise noted, all testing was performed at [facility],   IA No. [id number], [place] State Licensee No. [id number]     Glucose   Date/Time Value Ref Range Status   08/31/2024 1122 193 (H) 70 - 130 mg/dL Final   08/31/2024 0629 120 70 - 130 mg/dL Final   08/30/2024 2018 201 (H) 70 - 130 mg/dL Final   08/30/2024 1605 133 (H) 70 - 130 mg/dL Final   08/30/2024 1100 209 (H) 70 - 130 mg/dL Final       Duplex Venous Lower Extremity - Left    Normal left lower extremity venous duplex scan.    Scheduled Medications  albuterol, 2.5 mg, Nebulization, Q4H  budesonide, 0.5 mg, Nebulization, BID  cefepime, 1,000 mg, Intravenous, Q12H  cetirizine, 10 mg, Oral, Nightly  fluticasone, 1 spray, Nasal, Daily  furosemide, 20 mg, Oral, Daily  insulin lispro, 2-7 Units, Subcutaneous, 4x Daily AC & at Bedtime  metoprolol tartrate, 12.5 mg, Oral, Q12H  montelukast, 10 mg, Oral, Nightly  oxybutynin XL, 10 mg, Oral, BID  rosuvastatin, 20 mg, Oral, Nightly  sodium chloride, 10 mL, Intravenous, Q12H  sodium chloride, 4 mL, Nebulization, BID - RT  vancomycin, 1,000 mg, Intravenous, Q24H  warfarin, 2 mg, Oral, Once per day on Tuesday Thursday  warfarin, 4 mg, Oral, Once per day on Sunday Monday Wednesday Friday Saturday    Infusions  Pharmacy to dose vancomycin,   Pharmacy to dose warfarin,     Diet  Diet: Cardiac, Diabetic; Healthy Heart (2-3 Na+); Consistent Carbohydrate; Fluid Consistency: Thin (IDDSI 0)       Assessment/Plan     Active Hospital Problems    Diagnosis  POA    **Cellulitis of left lower leg [L03.116]  Yes    COPD (chronic obstructive pulmonary disease) [J44.9]  Yes    Hypokalemia [E87.6]  Yes    Anticoagulated on Coumadin [Z79.01]  Not Applicable    CLL (chronic lymphocytic leukemia) [C91.10]  Yes    Morbid obesity [E66.01]  Yes    Chronic combined systolic (congestive) and diastolic (congestive) heart failure [I50.42]  Yes    HTN (hypertension) [I10]  Yes    Coronary artery disease [I25.10]  Yes     HLD (hyperlipidemia) [E78.5]  Yes    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       89 y.o. female admitted with Cellulitis of left lower leg.    LLE Cellulitis  - in a diabetic patient with hypogammaglobulinemia, improving slowly  - continue IV vancomycin and cefepime given that she is chronically on keflex  - blood cultures NGTD  - venous doppler showed no DVT     HTN/CAD/PAF/Chronic Combined Systolic and Diastolic CHF  - BP, HR, and volume states acceptable, no anginal symptoms  - bp has been on the low side changed coreg to metoprolol  - continue lasix  - may need to consider midodrine if bp drops further, though things seem ok today  - on AC with warfarin, pharmacy dosing     Type 2 DM  - BG at goal  - holding metformin and glipizide  - continue coverage with ssi/hypoglycemia protocol     COPD  - no exacerbation  - continue current regimen, add saline nebs and mucinex to help with secretions     CLL  - has chronic leukocytosis usually 14-18k    Warfarin (home med) for DVT prophylaxis.  Full code.  Discussed with patient and nursing staff.  Anticipate discharge to SNU facility in 2-3 days.      Amaury Messer MD  [facility]  08/31/24  12:04 EDT     I wore protective equipment throughout this patient encounter including a face mask, gloves and protective eyewear.  Hand hygiene was performed before donning protective equipment and after removal when leaving the room.

## 2024-08-31 NOTE — THERAPY TREATMENT NOTE
Patient Name: Caitlyn Longoria  : 1934    MRN: [id number]                              Today's Date: 2024       Admit Date: 2024    Visit Dx:     ICD-10-CM ICD-9-CM   1. Cellulitis of left leg  L03.116 682.6   2. Chronic anticoagulation  Z79.01 V58.61     Patient Active Problem List   Diagnosis    Neck and shoulder pain    Arthropathy of shoulder region    Carpal tunnel syndrome of left wrist    Chronic pain of both shoulders    Chronic left shoulder pain    Arthropathy of left shoulder    Dysarthria    Type 2 diabetes mellitus with atherosclerosis of aorta    Paroxysmal atrial fibrillation    HLD (hyperlipidemia)    Chronic bronchitis    Coronary artery disease    CVA (cerebral vascular accident)    HTN (hypertension)    CKD (chronic kidney disease), stage III    Chronic combined systolic (congestive) and diastolic (congestive) heart failure    Infection of prosthetic right knee joint    Stasis dermatitis of right lower extremity due to peripheral venous hypertension    Current use of long term anticoagulation    Morbid obesity    Acute respiratory failure with hypoxia    Rhinovirus    Asthma with acute exacerbation    Acute respiratory failure with hypoxemia    Viral pneumonia    Hypoxia    CLL (chronic lymphocytic leukemia)    Dyspnea    Anticoagulated on Coumadin    Osteoporotic compression fracture of spine    Pneumonia due to gram-negative bacteria    Pneumonia due to infectious organism    Bilateral carotid artery disease    Hypogammaglobulinemia    Hypogammaglobulinemia    Cellulitis of right lower extremity    Hypokalemia    Nocturnal hypoxia    COPD (chronic obstructive pulmonary disease)    Upper respiratory tract infection    COPD (chronic obstructive pulmonary disease)    Sick sinus syndrome    Anemia    Thrombocytopenia    Chronic HFrEF (heart failure with reduced ejection fraction)    Cellulitis of left lower leg     Past Medical History:   Diagnosis Date    Aortic calcification      mild, on echo 12/17/2017    Aortic regurgitation     Trace    Asthma     Atrial fibrillation     CAD (coronary artery disease)     Carpal tunnel syndrome of left wrist     Chronic combined systolic and diastolic congestive heart failure     CKD (chronic kidney disease) stage 3, GFR 30-59 ml/min     COPD (chronic obstructive pulmonary disease)     Coronary artery disease involving native coronary artery of native heart with angina pectoris     Disc degeneration, lumbar     Diverticulosis     DM type 2 (diabetes mellitus, type 2)     GERD (gastroesophageal reflux disease)     History of aneurysm     right femoral artery s/p LHC    History of blood transfusion     History of fracture     History of heart attack     History of vitamin D deficiency     Hyperlipidemia     Hypertension     Leukemia     Mild mitral regurgitation     Mitral annular calcification     12/8/2017- echo, moderate    Osteopenia     PAF (paroxysmal atrial fibrillation)     Peripheral neuropathy     Skin cancer     Left hand    Sleep apnea     bipap    SSS (sick sinus syndrome)     Stroke (cerebrum)     TIA (transient ischemic attack) 2017    Tricuspid regurgitation     Trace     Past Surgical History:   Procedure Laterality Date    BRONCHOSCOPY Bilateral 10/6/2020    Procedure: BRONCHOSCOPY with BILATERAL LUNG washings;  Surgeon: Juno Coburn MD;  Location: [facility] ENDOSCOPY;  Service: Pulmonary;  Laterality: Bilateral;  PRE: purulent bronchitis  POST: PURULENT BRONCHITIS    BRONCHOSCOPY Bilateral 10/9/2020    Procedure: BRONCHOSCOPY with washing;  Surgeon: Juno Coburn MD;  Location: [facility] ENDOSCOPY;  Service: Pulmonary;  Laterality: Bilateral;  pre/post - mucous plug      CARDIAC CATHETERIZATION      CARDIAC ELECTROPHYSIOLOGY PROCEDURE N/A 2/7/2020    Procedure: PPM generator change - dual  medtronic;  Surgeon: Kiel Field MD;  Location: [facility] CATH INVASIVE LOCATION;  Service: Cardiology;  Laterality: N/A;    CHOLECYSTECTOMY       CORONARY STENT PLACEMENT      ENDOSCOPY N/A 9/14/2022    Procedure: ESOPHAGOGASTRODUODENOSCOPY with 54fr main dilatation;  Surgeon: Enio Cota MD;  Location: [facility] ENDOSCOPY;  Service: Gastroenterology;  Laterality: N/A;  pre - dysphagia  post - s/p dilatation, watermelon stomach    HERNIA REPAIR      hital hernia    HYSTERECTOMY      PACEMAKER IMPLANTATION      REPLACEMENT TOTAL KNEE Bilateral       General Information       Row Name 08/31/24 1532          Physical Therapy Time and Intention    Document Type therapy note (daily note)  -MO     Mode of Treatment physical therapy  -MO       Row Name 08/31/24 1532          General Information    Patient Profile Reviewed yes  -MO     Existing Precautions/Restrictions fall  -MO       Row Name 08/31/24 1532          Cognition    Orientation Status (Cognition) oriented x 4  -MO       Row Name 08/31/24 1532          Safety Issues, Functional Mobility    Impairments Affecting Function (Mobility) endurance/activity tolerance;strength  -MO               User Key  (r) = Recorded By, (t) = Taken By, (c) = Cosigned By      Initials Name Provider Type    MO Pattie Garcia PT Physical Therapist                   Mobility       Row Name 08/31/24 1533          Bed Mobility    Bed Mobility supine-sit  -MO     Supine-Sit Parker (Bed Mobility) contact guard  -MO     Assistive Device (Bed Mobility) bed rails;head of bed elevated  -MO       Row Name 08/31/24 1533          Sit-Stand Transfer    Sit-Stand Parker (Transfers) contact guard  -MO     Assistive Device (Sit-Stand Transfers) walker, front-wheeled  -MO       Row Name 08/31/24 1533          Gait/Stairs (Locomotion)    Parker Level (Gait) standby assist  -MO     Assistive Device (Gait) --  rollator  -MO     Distance in Feet (Gait) --  40' x 2  -MO     Deviations/Abnormal Patterns (Gait) gait speed decreased;stride length decreased;jeremi decreased  -MO     Bilateral Gait Deviations forward flexed  posture;heel strike decreased  -MO               User Key  (r) = Recorded By, (t) = Taken By, (c) = Cosigned By      Initials Name Provider Type    Pattie Amaya PT Physical Therapist                   Obj/Interventions       Row Name 08/31/24 1535          Motor Skills    Therapeutic Exercise --  x15 seated HR/toe raise with 3s hold, x10BL LAQ, x10BL seated marching  -MO               User Key  (r) = Recorded By, (t) = Taken By, (c) = Cosigned By      Initials Name Provider Type    Pattie Amaya PT Physical Therapist                   Goals/Plan    No documentation.                  Clinical Impression       Row Name 08/31/24 1536          Plan of Care Review    Plan of Care Reviewed With patient  -MO     Progress improving  -MO     Outcome Evaluation Pt agreeable and eager to work with PT this date. She performs bed mobility with SBA/CGA and completes seated ther ex prior to and post ambulation. She c/o increased calf tightness in LLE, educated pt on standing calf stretch, completed several reps with good tolerance. She amb 40' x 2 with standing rest break between, does have increased SOB throughout. Following ambulation, instructed pt on huffing techniques to help with mucus clearance. She remains appropriate for SNF at d/c, will continue to follow while here  -MO       Row Name 08/31/24 1530          Therapy Assessment/Plan (PT)    Rehab Potential (PT) good, to achieve stated therapy goals  -MO     Criteria for Skilled Interventions Met (PT) yes;meets criteria  -MO     Therapy Frequency (PT) 6 times/wk  -MO       Row Name 08/31/24 1536          Positioning and Restraints    Pre-Treatment Position in bed  -MO     Post Treatment Position chair  -MO     In Chair reclined;call light within reach;encouraged to call for assist;notified nsg  -MO               User Key  (r) = Recorded By, (t) = Taken By, (c) = Cosigned By      Initials Name Provider Type    Pattie Amaya PT Physical Therapist                    Outcome Measures       Row Name 08/31/24 1537 08/31/24 0807       How much help from another person do you currently need...    Turning from your back to your side while in flat bed without using bedrails? 3  -MO 4  -PB (r) RS (t) PB (c)    Moving from lying on back to sitting on the side of a flat bed without bedrails? 3  -MO 4  -PB (r) RS (t) PB (c)    Moving to and from a bed to a chair (including a wheelchair)? 3  -MO 3  -PB (r) RS (t) PB (c)    Standing up from a chair using your arms (e.g., wheelchair, bedside chair)? 3  -MO 3  -PB (r) RS (t) PB (c)    Climbing 3-5 steps with a railing? 2  -MO 2  -PB (r) RS (t) PB (c)    To walk in hospital room? 3  -MO 2  -PB (r) RS (t) PB (c)    AM-PAC 6 Clicks Score (PT) 17  -MO 18  -PB (r) RS (t)    Highest Level of Mobility Goal 5 --> Static standing  -MO 6 --> Walk 10 steps or more  -PB (r) RS (t)      Row Name 08/31/24 1537          Functional Assessment    Outcome Measure Options AM-PAC 6 Clicks Basic Mobility (PT)  -MO               User Key  (r) = Recorded By, (t) = Taken By, (c) = Cosigned By      Initials Name Provider Type    Willa Alberts RN Registered Nurse    Malgorzata Calhoun RN Extern Registered Nurse    Pattie Amaya PT Physical Therapist                                 Physical Therapy Education       Title: PT OT SLP Therapies (In Progress)       Topic: Physical Therapy (Done)       Point: Mobility training (Done)       Learning Progress Summary             Patient Acceptance, E, VU,NR by MO at 8/31/2024 1538    Acceptance, E,D, NR by EB at 8/30/2024 1535    Acceptance, E, VU,DU by PH at 8/28/2024 1542    Acceptance, E,D, DU by PC at 8/27/2024 1156                         Point: Home exercise program (Done)       Learning Progress Summary             Patient Acceptance, E, VU,NR by MO at 8/31/2024 1538    Acceptance, E,D, DU by PC at 8/27/2024 1156                         Point: Body mechanics (Done)       Learning Progress Summary              Patient Acceptance, E, VU,NR by MO at 8/31/2024 1538    Acceptance, E,D, NR by EB at 8/30/2024 1535    Acceptance, E, VU,DU by  at 8/28/2024 1542    Acceptance, E,D, DU by PC at 8/27/2024 1156                         Point: Precautions (Done)       Learning Progress Summary             Patient Acceptance, E, VU,NR by MO at 8/31/2024 1538    Acceptance, E, VU,DU by  at 8/28/2024 1542    Acceptance, E,D, DU by PC at 8/27/2024 1156                                         User Key       Initials Effective Dates Name Provider Type Discipline    PC 06/16/21 -  Deepa Thomas PT Physical Therapist PT    EB 02/14/23 -  Diana Simmons PTA Physical Therapist Assistant PT     06/16/21 -  Lisy Bautista PTA Physical Therapist Assistant PT    MO 05/26/23 -  Pattie Garcia PT Physical Therapist PT                  PT Recommendation and Plan     Plan of Care Reviewed With: patient  Progress: improving  Outcome Evaluation: Pt agreeable and eager to work with PT this date. She performs bed mobility with SBA/CGA and completes seated ther ex prior to and post ambulation. She c/o increased calf tightness in LLE, educated pt on standing calf stretch, completed several reps with good tolerance. She amb 40' x 2 with standing rest break between, does have increased SOB throughout. Following ambulation, instructed pt on huffing techniques to help with mucus clearance. She remains appropriate for SNF at d/c, will continue to follow while here     Time Calculation:         PT Charges       Row Name 08/31/24 1538             Time Calculation    Start Time 1442  -MO      Stop Time 1520  -MO      Time Calculation (min) 38 min  -MO      PT Received On 08/31/24  -MO      PT - Next Appointment 09/02/24  -MO         Time Calculation- PT    Total Timed Code Minutes- PT 28 minute(s)  -MO         Timed Charges    13557 - PT Therapeutic Activity Minutes 38  -MO         Total Minutes    Timed Charges Total Minutes 38  -MO       Total  Minutes 38  -MO                User Key  (r) = Recorded By, (t) = Taken By, (c) = Cosigned By      Initials Name Provider Type    Pattie Amaya, PT Physical Therapist                  Therapy Charges for Today       Code Description Service Date Service Provider Modifiers Qty    [id number]  PT THERAPEUTIC ACT EA 15 MIN 8/31/2024 Pattie Garcia, PT GP 3            PT G-Codes  Outcome Measure Options: AM-PAC 6 Clicks Basic Mobility (PT)  AM-PAC 6 Clicks Score (PT): 17  AM-PAC 6 Clicks Score (OT): 15  PT Discharge Summary  Anticipated Discharge Disposition (PT): skilled nursing facility, assisted living    Pattie Garcia, PT  8/31/2024

## 2024-08-31 NOTE — PLAN OF CARE
Goal Outcome Evaluation:  Plan of Care Reviewed With: patient        Progress: improving  Outcome Evaluation: Pt agreeable and eager to work with PT this date. She performs bed mobility with SBA/CGA and completes seated ther ex prior to and post ambulation. She c/o increased calf tightness in LLE, educated pt on standing calf stretch, completed several reps with good tolerance. She amb 40' x 2 with standing rest break between, does have increased SOB throughout. Following ambulation, instructed pt on huffing techniques to help with mucus clearance. She remains appropriate for SNF at d/c, will continue to follow while here      Anticipated Discharge Disposition (PT): skilled nursing facility, assisted living

## 2024-09-01 LAB
ANION GAP SERPL CALCULATED.3IONS-SCNC: 9 MMOL/L (ref 5–15)
BASOPHILS # BLD AUTO: 0.04 10*3/MM3 (ref 0–0.2)
BASOPHILS NFR BLD AUTO: 0.2 % (ref 0–1.5)
BUN SERPL-MCNC: 12 MG/DL (ref 8–23)
BUN/CREAT SERPL: 15.8 (ref 7–25)
CALCIUM SPEC-SCNC: 8.3 MG/DL (ref 8.6–10.5)
CHLORIDE SERPL-SCNC: 103 MMOL/L (ref 98–107)
CO2 SERPL-SCNC: 26 MMOL/L (ref 22–29)
CREAT SERPL-MCNC: 0.76 MG/DL (ref 0.57–1)
DEPRECATED RDW RBC AUTO: 45.9 FL (ref 37–54)
EGFRCR SERPLBLD CKD-EPI 2021: 75 ML/MIN/1.73
EOSINOPHIL # BLD AUTO: 0 10*3/MM3 (ref 0–0.4)
EOSINOPHIL NFR BLD AUTO: 0 % (ref 0.3–6.2)
ERYTHROCYTE [DISTWIDTH] IN BLOOD BY AUTOMATED COUNT: 12.6 % (ref 12.3–15.4)
GLUCOSE BLDC GLUCOMTR-MCNC: 128 MG/DL (ref 70–130)
GLUCOSE BLDC GLUCOMTR-MCNC: 165 MG/DL (ref 70–130)
GLUCOSE BLDC GLUCOMTR-MCNC: 193 MG/DL (ref 70–130)
GLUCOSE BLDC GLUCOMTR-MCNC: 213 MG/DL (ref 70–130)
GLUCOSE SERPL-MCNC: 134 MG/DL (ref 65–99)
HCT VFR BLD AUTO: 38.9 % (ref 34–46.6)
HGB BLD-MCNC: 12.5 G/DL (ref 12–15.9)
IMM GRANULOCYTES # BLD AUTO: 0.08 10*3/MM3 (ref 0–0.05)
IMM GRANULOCYTES NFR BLD AUTO: 0.3 % (ref 0–0.5)
INR PPP: 2.84 (ref 0.9–1.1)
LYMPHOCYTES # BLD AUTO: 19.44 10*3/MM3 (ref 0.7–3.1)
LYMPHOCYTES NFR BLD AUTO: 82 % (ref 19.6–45.3)
MCH RBC QN AUTO: 32.1 PG (ref 26.6–33)
MCHC RBC AUTO-ENTMCNC: 32.1 G/DL (ref 31.5–35.7)
MCV RBC AUTO: 99.7 FL (ref 79–97)
MONOCYTES # BLD AUTO: 0.17 10*3/MM3 (ref 0.1–0.9)
MONOCYTES NFR BLD AUTO: 0.7 % (ref 5–12)
NEUTROPHILS NFR BLD AUTO: 16.8 % (ref 42.7–76)
NEUTROPHILS NFR BLD AUTO: 3.98 10*3/MM3 (ref 1.7–7)
PLATELET # BLD AUTO: 288 10*3/MM3 (ref 140–450)
PMV BLD AUTO: 9.5 FL (ref 6–12)
POTASSIUM SERPL-SCNC: 3.7 MMOL/L (ref 3.5–5.2)
PROTHROMBIN TIME: 30.1 SECONDS (ref 11.7–14.2)
RBC # BLD AUTO: 3.9 10*6/MM3 (ref 3.77–5.28)
SODIUM SERPL-SCNC: 138 MMOL/L (ref 136–145)
WBC NRBC COR # BLD AUTO: 23.71 10*3/MM3 (ref 3.4–10.8)

## 2024-09-01 PROCEDURE — 25010000002 CEFEPIME PER 500 MG: Performed by: INTERNAL MEDICINE

## 2024-09-01 PROCEDURE — 94799 UNLISTED PULMONARY SVC/PX: CPT

## 2024-09-01 PROCEDURE — 25010000002 VANCOMYCIN HCL 1.25 G RECONSTITUTED SOLUTION 1 EACH VIAL: Performed by: STUDENT IN AN ORGANIZED HEALTH CARE EDUCATION/TRAINING PROGRAM

## 2024-09-01 PROCEDURE — 94664 DEMO&/EVAL PT USE INHALER: CPT

## 2024-09-01 PROCEDURE — 94761 N-INVAS EAR/PLS OXIMETRY MLT: CPT

## 2024-09-01 PROCEDURE — 85025 COMPLETE CBC W/AUTO DIFF WBC: CPT | Performed by: INTERNAL MEDICINE

## 2024-09-01 PROCEDURE — 85610 PROTHROMBIN TIME: CPT | Performed by: INTERNAL MEDICINE

## 2024-09-01 PROCEDURE — 63710000001 INSULIN LISPRO (HUMAN) PER 5 UNITS: Performed by: STUDENT IN AN ORGANIZED HEALTH CARE EDUCATION/TRAINING PROGRAM

## 2024-09-01 PROCEDURE — 82948 REAGENT STRIP/BLOOD GLUCOSE: CPT

## 2024-09-01 PROCEDURE — 94760 N-INVAS EAR/PLS OXIMETRY 1: CPT

## 2024-09-01 PROCEDURE — 80048 BASIC METABOLIC PNL TOTAL CA: CPT | Performed by: INTERNAL MEDICINE

## 2024-09-01 PROCEDURE — 25810000003 SODIUM CHLORIDE 0.9 % SOLUTION 250 ML FLEX CONT: Performed by: STUDENT IN AN ORGANIZED HEALTH CARE EDUCATION/TRAINING PROGRAM

## 2024-09-01 RX ADMIN — GUAIFENESIN 200 MG: 100 LIQUID ORAL at 08:21

## 2024-09-01 RX ADMIN — Medication 4 ML: at 07:29

## 2024-09-01 RX ADMIN — ALBUTEROL SULFATE 2.5 MG: 2.5 SOLUTION RESPIRATORY (INHALATION) at 11:16

## 2024-09-01 RX ADMIN — BUDESONIDE 0.5 MG: 0.5 INHALANT RESPIRATORY (INHALATION) at 19:37

## 2024-09-01 RX ADMIN — GUAIFENESIN 200 MG: 100 LIQUID ORAL at 20:52

## 2024-09-01 RX ADMIN — CEFEPIME 1000 MG: 1 INJECTION, POWDER, FOR SOLUTION INTRAMUSCULAR; INTRAVENOUS at 02:00

## 2024-09-01 RX ADMIN — OXYBUTYNIN CHLORIDE 10 MG: 10 TABLET, EXTENDED RELEASE ORAL at 20:53

## 2024-09-01 RX ADMIN — INSULIN LISPRO 2 UNITS: 100 INJECTION, SOLUTION INTRAVENOUS; SUBCUTANEOUS at 12:31

## 2024-09-01 RX ADMIN — ALBUTEROL SULFATE 2.5 MG: 2.5 SOLUTION RESPIRATORY (INHALATION) at 15:44

## 2024-09-01 RX ADMIN — ALBUTEROL SULFATE 2.5 MG: 2.5 SOLUTION RESPIRATORY (INHALATION) at 19:37

## 2024-09-01 RX ADMIN — Medication 5 MG: at 20:52

## 2024-09-01 RX ADMIN — INSULIN LISPRO 3 UNITS: 100 INJECTION, SOLUTION INTRAVENOUS; SUBCUTANEOUS at 17:17

## 2024-09-01 RX ADMIN — FLUTICASONE PROPIONATE 1 SPRAY: 50 SPRAY, METERED NASAL at 08:21

## 2024-09-01 RX ADMIN — Medication 10 ML: at 08:21

## 2024-09-01 RX ADMIN — ALBUTEROL SULFATE 2.5 MG: 2.5 SOLUTION RESPIRATORY (INHALATION) at 03:01

## 2024-09-01 RX ADMIN — Medication 4 ML: at 19:49

## 2024-09-01 RX ADMIN — ALBUTEROL SULFATE 2.5 MG: 2.5 SOLUTION RESPIRATORY (INHALATION) at 23:28

## 2024-09-01 RX ADMIN — GUAIFENESIN 200 MG: 100 LIQUID ORAL at 17:20

## 2024-09-01 RX ADMIN — BUDESONIDE 0.5 MG: 0.5 INHALANT RESPIRATORY (INHALATION) at 07:28

## 2024-09-01 RX ADMIN — WARFARIN 4 MG: 4 TABLET ORAL at 17:17

## 2024-09-01 RX ADMIN — CETIRIZINE HYDROCHLORIDE 10 MG: 10 TABLET ORAL at 20:53

## 2024-09-01 RX ADMIN — METOPROLOL TARTRATE 12.5 MG: 25 TABLET, FILM COATED ORAL at 08:21

## 2024-09-01 RX ADMIN — Medication 10 ML: at 20:53

## 2024-09-01 RX ADMIN — GUAIFENESIN 200 MG: 100 LIQUID ORAL at 13:43

## 2024-09-01 RX ADMIN — CEFEPIME 1000 MG: 1 INJECTION, POWDER, FOR SOLUTION INTRAMUSCULAR; INTRAVENOUS at 13:43

## 2024-09-01 RX ADMIN — FUROSEMIDE 20 MG: 20 TABLET ORAL at 08:21

## 2024-09-01 RX ADMIN — ALBUTEROL SULFATE 2.5 MG: 2.5 SOLUTION RESPIRATORY (INHALATION) at 07:27

## 2024-09-01 RX ADMIN — OXYBUTYNIN CHLORIDE 10 MG: 10 TABLET, EXTENDED RELEASE ORAL at 08:21

## 2024-09-01 RX ADMIN — MONTELUKAST SODIUM 10 MG: 10 TABLET, FILM COATED ORAL at 20:53

## 2024-09-01 RX ADMIN — VANCOMYCIN HYDROCHLORIDE 1250 MG: 1.25 INJECTION, POWDER, LYOPHILIZED, FOR SOLUTION INTRAVENOUS at 18:33

## 2024-09-01 RX ADMIN — ROSUVASTATIN CALCIUM 20 MG: 20 TABLET, FILM COATED ORAL at 20:52

## 2024-09-01 NOTE — PROGRESS NOTES
Name: Caitlyn Longoria ADMIT: 2024   : 1934  PCP: Zenaida Suarez MD    MRN: [id number] LOS: 5 days   AGE/SEX: 89 y.o. female  ROOM: [facility] ([facility])     Subjective   Subjective     No events overnight. Feeling well. Wbc is back up again, but she looks better than I've seen her. No tachycardia or fevers. Leg is more swollen, but less red. She says that she had it down (not elevated) for lunch       Objective   Objective   Vital Signs  Temp:  [97.7 °F (36.5 °C)-98.1 °F (36.7 °C)] 98.1 °F (36.7 °C)  Heart Rate:  [80-82] 80  Resp:  [16-24] 18  BP: ()/(55-71) 92/60  SpO2:  [94 %-100 %] 99 %  on  Flow (L/min):  [2-3] 2;   Device (Oxygen Therapy): room air  Body mass index is 32.81 kg/m².  Physical Exam  Constitutional:       General: She is not in acute distress.     Appearance: She is not toxic-appearing.   Cardiovascular:      Rate and Rhythm: Normal rate and regular rhythm.      Heart sounds: Normal heart sounds.   Pulmonary:      Effort: Pulmonary effort is normal.      Breath sounds: Normal breath sounds.   Abdominal:      General: Bowel sounds are normal.      Palpations: Abdomen is soft.   Musculoskeletal:      Right lower leg: No edema.      Left lower leg: Edema present.   Skin:     Findings: Erythema and rash present.      Comments: Left lower extremity erythema continues to retreat  Less warm to the touch today  Still fairly swollen   Neurological:      Mental Status: She is alert.   Psychiatric:         Mood and Affect: Mood normal.         Behavior: Behavior normal.         Results Review     I reviewed the patient's new clinical results.  Results from last 7 days   Lab Units 24  0538 24  0529 24  0723 24  0303   WBC 10*3/mm3 23.71* 18.21* 21.30* 16.17*   HEMOGLOBIN g/dL 12.5 11.2* 12.1 11.2*   PLATELETS 10*3/mm3 288 222 209 174     Results from last 7 days   Lab Units 24  0537 24  0530 24  0723 24  0303   SODIUM mmol/L 138 141 139 139    POTASSIUM mmol/L 3.7 3.8 4.1 4.1   CHLORIDE mmol/L 103 105 105 107   CO2 mmol/L 26.0 28.0 27.0 23.7   BUN mg/dL 12 13 14 16   CREATININE mg/dL 0.76 0.71 0.74 0.79   GLUCOSE mg/dL 134* 126* 157* 143*   Estimated Creatinine Clearance: 51.5 mL/min (by C-G formula based on SCr of 0.76 mg/dL).  Results from last 7 days   Lab Units 08/26/24  1139   ALBUMIN g/dL 4.1   BILIRUBIN mg/dL 2.5*   ALK PHOS U/L 218*   AST (SGOT) U/L 217*   ALT (SGPT) U/L 175*     Results from last 7 days   Lab Units 09/01/24  0537 08/31/24  0530 08/30/24  0723 08/29/24  0303 08/27/24  0540 08/26/24  1139   CALCIUM mg/dL 8.3* 8.1* 8.1* 8.0*   < > 10.3   ALBUMIN g/dL  --   --   --   --   --  4.1    < > = values in this interval not displayed.     Results from last 7 days   Lab Units 08/26/24  1139   LACTATE mmol/L 1.6     COVID19   Date Value Ref Range Status   08/22/2024 Not Detected Not Detected - Ref. Range Final   11/09/2020 Not Detected Not Detected - Ref. Range Final   10/01/2020 Not Detected Not Detected - Ref. Range Final   08/29/2020 Not Detected Not Detected - Ref. Range Final     SARS-CoV-2 PCR   Date Value Ref Range Status   09/29/2020 Not Detected Not Detected Final     Comment:     Nucleic acid specific to SARS-CoV-2 (COVID-19) virus was not detected inthis sample by the TaqPath (TM) COVID-19 Combo Kit.SARS-CoV-2 (COVID-19) nucleic acid testing performed using [employer] Aptima (R) SARS-CoV-2 Assay or [employer] TaqPath   (TM)COVID-19 Combo Kit as indicated above under Emergency Use Authorization (EUA) from the FDA. Aptima (R) and TaqPath (TM) test performancecharacteristics verified by [facility] in accordance with the FDAs Guidance Document (Policy for Diagnostic   Tests for Coronavirus Disease-2019during the Public Health Emergency) issued on March 16, 2020. The laboratory is regulated under CLIA as qualified to perform high-complexity testingUnless otherwise noted, all testing was performed at [facility]  No[facility] [id number], [place] State Licensee No. [id number]     Glucose   Date/Time Value Ref Range Status   09/01/2024 1039 193 (H) 70 - 130 mg/dL Final   09/01/2024 0617 128 70 - 130 mg/dL Final   08/31/2024 2019 187 (H) 70 - 130 mg/dL Final   08/31/2024 1656 239 (H) 70 - 130 mg/dL Final   08/31/2024 1122 193 (H) 70 - 130 mg/dL Final   08/31/2024 0629 120 70 - 130 mg/dL Final   08/30/2024 2018 201 (H) 70 - 130 mg/dL Final       Duplex Venous Lower Extremity - Left    Normal left lower extremity venous duplex scan.    Scheduled Medications  albuterol, 2.5 mg, Nebulization, Q4H  budesonide, 0.5 mg, Nebulization, BID  cefepime, 1,000 mg, Intravenous, Q12H  cetirizine, 10 mg, Oral, Nightly  fluticasone, 1 spray, Nasal, Daily  furosemide, 20 mg, Oral, Daily  insulin lispro, 2-7 Units, Subcutaneous, 4x Daily AC & at Bedtime  metoprolol tartrate, 12.5 mg, Oral, Q12H  montelukast, 10 mg, Oral, Nightly  oxybutynin XL, 10 mg, Oral, BID  rosuvastatin, 20 mg, Oral, Nightly  sodium chloride, 10 mL, Intravenous, Q12H  sodium chloride, 4 mL, Nebulization, BID - RT  vancomycin, 1,250 mg, Intravenous, Q24H  warfarin, 2 mg, Oral, Once per day on Tuesday Thursday  warfarin, 4 mg, Oral, Once per day on Sunday Monday Wednesday Friday Saturday    Infusions  Pharmacy to dose vancomycin,   Pharmacy to dose warfarin,     Diet  Diet: Cardiac, Diabetic; Healthy Heart (2-3 Na+); Consistent Carbohydrate; Fluid Consistency: Thin (IDDSI 0)       Assessment/Plan     Active Hospital Problems    Diagnosis  POA    **Cellulitis of left lower leg [L03.116]  Yes    COPD (chronic obstructive pulmonary disease) [J44.9]  Yes    Hypokalemia [E87.6]  Yes    Anticoagulated on Coumadin [Z79.01]  Not Applicable    CLL (chronic lymphocytic leukemia) [C91.10]  Yes    Morbid obesity [E66.01]  Yes    Chronic combined systolic (congestive) and diastolic (congestive) heart failure [I50.42]  Yes    HTN (hypertension) [I10]  Yes    Coronary artery disease [I25.10]  Yes    HLD  (hyperlipidemia) [E78.5]  Yes    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       89 y.o. female admitted with Cellulitis of left lower leg.    LLE Cellulitis  - in a diabetic patient with hypogammaglobulinemia, improving slowly  - continue IV vancomycin and cefepime given that she is chronically on keflex  - blood cultures NGTD  - venous doppler showed no DVT  - her RN is going to wrap the leg in ace bandage to see if we can reduce the swelling     HTN/CAD/PAF/Chronic Combined Systolic and Diastolic CHF  - BP, HR, and volume states acceptable, no anginal symptoms  - bp has been on the low side changed coreg to metoprolol  - continue lasix  - may need to consider midodrine if bp drops further, though things seem ok today  - on AC with warfarin, pharmacy dosing     Type 2 DM  - BG at goal  - holding metformin and glipizide  - continue coverage with ssi/hypoglycemia protocol     COPD  - no exacerbation  - continue current regimen, add saline nebs and mucinex to help with secretions     CLL  - has chronic leukocytosis usually 14-18k  - up to 23.7 today, but really no signs of worsening or new infection    Warfarin (home med) for DVT prophylaxis.  Full code.  Discussed with patient and nursing staff.  Anticipate discharge to SNU facility in 2-3 days. Probably Tuesday      Amaury Messer MD  [facility]  09/01/24  14:33 EDT     I wore protective equipment throughout this patient encounter including a face mask, gloves and protective eyewear.  Hand hygiene was performed before donning protective equipment and after removal when leaving the room.

## 2024-09-01 NOTE — PROGRESS NOTES
"[facility] Clinical Pharmacy Services: Vancomycin Monitoring Note    Caitlyn Longoria is a 89 y.o. female who is on day 5 of pharmacy to dose vancomycin for Skin and Soft Tissue.    Previous Vancomycin Dose:    1250 mg IV every  24  hours  Updated Cultures and Sensitivities:   8/26 Bcx NGTD  Results from last 7 days   Lab Units 08/29/24  2139 08/27/24  0540   VANCOMYCIN RM mcg/mL  --  25.60   VANCOMYCIN TR mcg/mL 10.80  --      Vitals/Labs  Ht: 160 cm (62.99\"); Wt: 84 kg (185 lb 3 oz)   Temp Readings from Last 1 Encounters:   09/01/24 98.1 °F (36.7 °C) (Oral)     Estimated Creatinine Clearance: 51.5 mL/min (by C-G formula based on SCr of 0.76 mg/dL).        Results from last 7 days   Lab Units 09/01/24  0538 09/01/24  0537 08/31/24  0530 08/31/24  0529 08/30/24  0723   CREATININE mg/dL  --  0.76 0.71  --  0.74   WBC 10*3/mm3 23.71*  --   --  18.21* 21.30*     Assessment/Plan    Current Vancomycin Dose: 1250 mg IV every  24  hours; provides a predicted  mg/L.hr   Next Level Date and Time: Duration extended, will order trough before 4th dose -- 9/3 at 1700  We will continue to monitor patient changes and renal function     Thank you for involving pharmacy in this patient's care. Please contact pharmacy with any questions or concerns.       Malissa Rhodes, [facility]  Clinical Pharmacist            "

## 2024-09-01 NOTE — PLAN OF CARE
Goal Outcome Evaluation:   Patient alert follows commands no c/o pain or nausea noted at this time. Iv antibx given. Skin care and incont care provided. No acute distress noted will continue to monitor

## 2024-09-01 NOTE — PLAN OF CARE
Goal Outcome Evaluation:           Progress: improving  Outcome Evaluation: Pt AOx4. On RA. NSR on monitor. WBC higher today- MD aware- related to CLL hx. Had a large BM on BSC assist x1 w/ rollator. Sat up in chair for most of the day. Legs elevated. 4+ swelling in LLE and redness continues. Per MD will try and wrap with kerlix and ace if toelrated. Pt c/o cough- prn robutussin given per MAR. Pt not in acute distress. Plan of care ongoing

## 2024-09-02 LAB
ANION GAP SERPL CALCULATED.3IONS-SCNC: 8.5 MMOL/L (ref 5–15)
BASOPHILS # BLD MANUAL: 0 10*3/MM3 (ref 0–0.2)
BASOPHILS NFR BLD MANUAL: 0 % (ref 0–1.5)
BUN SERPL-MCNC: 11 MG/DL (ref 8–23)
BUN/CREAT SERPL: 16.9 (ref 7–25)
CALCIUM SPEC-SCNC: 8.3 MG/DL (ref 8.6–10.5)
CHLORIDE SERPL-SCNC: 105 MMOL/L (ref 98–107)
CO2 SERPL-SCNC: 27.5 MMOL/L (ref 22–29)
CREAT SERPL-MCNC: 0.65 MG/DL (ref 0.57–1)
DEPRECATED RDW RBC AUTO: 47.7 FL (ref 37–54)
EGFRCR SERPLBLD CKD-EPI 2021: 84.3 ML/MIN/1.73
EOSINOPHIL # BLD MANUAL: 0 10*3/MM3 (ref 0–0.4)
EOSINOPHIL NFR BLD MANUAL: 0 % (ref 0.3–6.2)
ERYTHROCYTE [DISTWIDTH] IN BLOOD BY AUTOMATED COUNT: 13 % (ref 12.3–15.4)
GLUCOSE BLDC GLUCOMTR-MCNC: 123 MG/DL (ref 70–130)
GLUCOSE BLDC GLUCOMTR-MCNC: 164 MG/DL (ref 70–130)
GLUCOSE BLDC GLUCOMTR-MCNC: 185 MG/DL (ref 70–130)
GLUCOSE BLDC GLUCOMTR-MCNC: 223 MG/DL (ref 70–130)
GLUCOSE SERPL-MCNC: 135 MG/DL (ref 65–99)
HCT VFR BLD AUTO: 36.1 % (ref 34–46.6)
HGB BLD-MCNC: 11.3 G/DL (ref 12–15.9)
INR PPP: 3.11 (ref 0.9–1.1)
LYMPHOCYTES # BLD MANUAL: 7.65 10*3/MM3 (ref 0.7–3.1)
LYMPHOCYTES NFR BLD MANUAL: 2.1 % (ref 5–12)
MCH RBC QN AUTO: 31.4 PG (ref 26.6–33)
MCHC RBC AUTO-ENTMCNC: 31.3 G/DL (ref 31.5–35.7)
MCV RBC AUTO: 100.3 FL (ref 79–97)
MONOCYTES # BLD: 0.38 10*3/MM3 (ref 0.1–0.9)
NEUTROPHILS # BLD AUTO: 9.89 10*3/MM3 (ref 1.7–7)
NEUTROPHILS NFR BLD MANUAL: 55.2 % (ref 42.7–76)
PLAT MORPH BLD: NORMAL
PLATELET # BLD AUTO: 254 10*3/MM3 (ref 140–450)
PMV BLD AUTO: 9.6 FL (ref 6–12)
POTASSIUM SERPL-SCNC: 3.2 MMOL/L (ref 3.5–5.2)
POTASSIUM SERPL-SCNC: 3.9 MMOL/L (ref 3.5–5.2)
PROTHROMBIN TIME: 32.3 SECONDS (ref 11.7–14.2)
RBC # BLD AUTO: 3.6 10*6/MM3 (ref 3.77–5.28)
RBC MORPH BLD: NORMAL
SMUDGE CELLS BLD QL SMEAR: ABNORMAL
SODIUM SERPL-SCNC: 141 MMOL/L (ref 136–145)
VARIANT LYMPHS NFR BLD MANUAL: 42.7 % (ref 19.6–45.3)
WBC NRBC COR # BLD AUTO: 17.91 10*3/MM3 (ref 3.4–10.8)

## 2024-09-02 PROCEDURE — 85007 BL SMEAR W/DIFF WBC COUNT: CPT | Performed by: INTERNAL MEDICINE

## 2024-09-02 PROCEDURE — 94799 UNLISTED PULMONARY SVC/PX: CPT

## 2024-09-02 PROCEDURE — 80048 BASIC METABOLIC PNL TOTAL CA: CPT | Performed by: INTERNAL MEDICINE

## 2024-09-02 PROCEDURE — 25010000002 VANCOMYCIN HCL 1.25 G RECONSTITUTED SOLUTION 1 EACH VIAL: Performed by: STUDENT IN AN ORGANIZED HEALTH CARE EDUCATION/TRAINING PROGRAM

## 2024-09-02 PROCEDURE — 97116 GAIT TRAINING THERAPY: CPT

## 2024-09-02 PROCEDURE — 94761 N-INVAS EAR/PLS OXIMETRY MLT: CPT

## 2024-09-02 PROCEDURE — 94664 DEMO&/EVAL PT USE INHALER: CPT

## 2024-09-02 PROCEDURE — 84132 ASSAY OF SERUM POTASSIUM: CPT | Performed by: STUDENT IN AN ORGANIZED HEALTH CARE EDUCATION/TRAINING PROGRAM

## 2024-09-02 PROCEDURE — 97530 THERAPEUTIC ACTIVITIES: CPT

## 2024-09-02 PROCEDURE — 85610 PROTHROMBIN TIME: CPT | Performed by: INTERNAL MEDICINE

## 2024-09-02 PROCEDURE — 85025 COMPLETE CBC W/AUTO DIFF WBC: CPT | Performed by: INTERNAL MEDICINE

## 2024-09-02 PROCEDURE — 82948 REAGENT STRIP/BLOOD GLUCOSE: CPT

## 2024-09-02 PROCEDURE — 25010000002 CEFEPIME PER 500 MG: Performed by: STUDENT IN AN ORGANIZED HEALTH CARE EDUCATION/TRAINING PROGRAM

## 2024-09-02 PROCEDURE — 25810000003 SODIUM CHLORIDE 0.9 % SOLUTION 250 ML FLEX CONT: Performed by: STUDENT IN AN ORGANIZED HEALTH CARE EDUCATION/TRAINING PROGRAM

## 2024-09-02 PROCEDURE — 63710000001 INSULIN LISPRO (HUMAN) PER 5 UNITS: Performed by: STUDENT IN AN ORGANIZED HEALTH CARE EDUCATION/TRAINING PROGRAM

## 2024-09-02 RX ORDER — POTASSIUM CHLORIDE 750 MG/1
40 TABLET, FILM COATED, EXTENDED RELEASE ORAL EVERY 4 HOURS
Status: COMPLETED | OUTPATIENT
Start: 2024-09-02 | End: 2024-09-02

## 2024-09-02 RX ORDER — WARFARIN SODIUM 4 MG/1
4 TABLET ORAL
Status: DISCONTINUED | OUTPATIENT
Start: 2024-09-03 | End: 2024-09-03 | Stop reason: HOSPADM

## 2024-09-02 RX ADMIN — ALBUTEROL SULFATE 2.5 MG: 2.5 SOLUTION RESPIRATORY (INHALATION) at 10:58

## 2024-09-02 RX ADMIN — Medication 10 ML: at 21:16

## 2024-09-02 RX ADMIN — POTASSIUM CHLORIDE 40 MEQ: 750 TABLET, EXTENDED RELEASE ORAL at 10:34

## 2024-09-02 RX ADMIN — FUROSEMIDE 20 MG: 20 TABLET ORAL at 08:42

## 2024-09-02 RX ADMIN — INSULIN LISPRO 2 UNITS: 100 INJECTION, SOLUTION INTRAVENOUS; SUBCUTANEOUS at 21:16

## 2024-09-02 RX ADMIN — INSULIN LISPRO 3 UNITS: 100 INJECTION, SOLUTION INTRAVENOUS; SUBCUTANEOUS at 12:30

## 2024-09-02 RX ADMIN — ALBUTEROL SULFATE 2.5 MG: 2.5 SOLUTION RESPIRATORY (INHALATION) at 07:23

## 2024-09-02 RX ADMIN — POTASSIUM CHLORIDE 40 MEQ: 750 TABLET, EXTENDED RELEASE ORAL at 14:31

## 2024-09-02 RX ADMIN — ALBUTEROL SULFATE 2.5 MG: 2.5 SOLUTION RESPIRATORY (INHALATION) at 19:58

## 2024-09-02 RX ADMIN — Medication 4 ML: at 20:10

## 2024-09-02 RX ADMIN — OXYBUTYNIN CHLORIDE 10 MG: 10 TABLET, EXTENDED RELEASE ORAL at 08:42

## 2024-09-02 RX ADMIN — CEFEPIME 1000 MG: 1 INJECTION, POWDER, FOR SOLUTION INTRAMUSCULAR; INTRAVENOUS at 02:34

## 2024-09-02 RX ADMIN — CETIRIZINE HYDROCHLORIDE 10 MG: 10 TABLET ORAL at 21:16

## 2024-09-02 RX ADMIN — VANCOMYCIN HYDROCHLORIDE 1250 MG: 1.25 INJECTION, POWDER, LYOPHILIZED, FOR SOLUTION INTRAVENOUS at 17:31

## 2024-09-02 RX ADMIN — MONTELUKAST SODIUM 10 MG: 10 TABLET, FILM COATED ORAL at 21:16

## 2024-09-02 RX ADMIN — BUDESONIDE 0.5 MG: 0.5 INHALANT RESPIRATORY (INHALATION) at 07:27

## 2024-09-02 RX ADMIN — METOPROLOL TARTRATE 12.5 MG: 25 TABLET, FILM COATED ORAL at 21:16

## 2024-09-02 RX ADMIN — INSULIN LISPRO 2 UNITS: 100 INJECTION, SOLUTION INTRAVENOUS; SUBCUTANEOUS at 17:31

## 2024-09-02 RX ADMIN — CEFEPIME 1000 MG: 1 INJECTION, POWDER, FOR SOLUTION INTRAMUSCULAR; INTRAVENOUS at 14:31

## 2024-09-02 RX ADMIN — ROSUVASTATIN CALCIUM 20 MG: 20 TABLET, FILM COATED ORAL at 21:16

## 2024-09-02 RX ADMIN — BUDESONIDE 0.5 MG: 0.5 INHALANT RESPIRATORY (INHALATION) at 19:59

## 2024-09-02 RX ADMIN — ALBUTEROL SULFATE 2.5 MG: 2.5 SOLUTION RESPIRATORY (INHALATION) at 15:43

## 2024-09-02 RX ADMIN — OXYBUTYNIN CHLORIDE 10 MG: 10 TABLET, EXTENDED RELEASE ORAL at 21:16

## 2024-09-02 RX ADMIN — GUAIFENESIN 200 MG: 100 LIQUID ORAL at 10:34

## 2024-09-02 RX ADMIN — Medication 10 ML: at 08:43

## 2024-09-02 RX ADMIN — METOPROLOL TARTRATE 12.5 MG: 25 TABLET, FILM COATED ORAL at 08:42

## 2024-09-02 RX ADMIN — ALBUTEROL SULFATE 2.5 MG: 2.5 SOLUTION RESPIRATORY (INHALATION) at 23:07

## 2024-09-02 RX ADMIN — FLUTICASONE PROPIONATE 1 SPRAY: 50 SPRAY, METERED NASAL at 08:42

## 2024-09-02 RX ADMIN — Medication 4 ML: at 07:31

## 2024-09-02 NOTE — PROGRESS NOTES
"[facility] Clinical Pharmacy Services: Vancomycin Monitoring Note    Caitlyn Longoria is a 89 y.o. female who is on day 6 of pharmacy to dose vancomycin for Skin and Soft Tissue.    Previous Vancomycin Dose:    1250 mg IV every  24  hours  Updated Cultures and Sensitivities:   8/26 Bcx NGTD  Results from last 7 days   Lab Units 08/29/24  2139 08/27/24  0540   VANCOMYCIN RM mcg/mL  --  25.60   VANCOMYCIN TR mcg/mL 10.80  --      Vitals/Labs  Ht: 160 cm (62.99\"); Wt: 84 kg (185 lb 3 oz)   Temp Readings from Last 1 Encounters:   09/01/24 98.1 °F (36.7 °C) (Oral)     Estimated Creatinine Clearance: 60.2 mL/min (by C-G formula based on SCr of 0.65 mg/dL).        Results from last 7 days   Lab Units 09/02/24  0625 09/01/24  0538 09/01/24  0537 08/31/24  0530 08/31/24  0529   CREATININE mg/dL 0.65  --  0.76 0.71  --    WBC 10*3/mm3 17.91* 23.71*  --   --  18.21*     Assessment/Plan    Current Vancomycin Dose: 1250 mg IV every  24  hours; provides a predicted  mg/L.hr   Next Level Date and Time: Duration extended, will order trough before 4th dose -- 9/3 at 1700  We will continue to monitor patient changes and renal function     Thank you for involving pharmacy in this patient's care. Please contact pharmacy with any questions or concerns.        Pattie Mckenzie, [facility]    Clinical Pharmacist              "

## 2024-09-02 NOTE — PLAN OF CARE
Goal Outcome Evaluation:               Pt resting in bed quietly. Left leg wrapped per order to try to reduce swelling. Turns self. 2 L while asleep. Medicated with tylenol.

## 2024-09-02 NOTE — PROGRESS NOTES
Name: Caitlyn Longoria ADMIT: 2024   : 1934  PCP: Zenaida Suarez MD    MRN: [id number] LOS: 6 days   AGE/SEX: 89 y.o. female  ROOM: [facility]     Subjective   Subjective     Selling was a bit worse yesterday, but now her leg is wrapped in gauze. Wbc is better at 17k and there are no other signs of ongoing uncontrolled infection.       Objective   Objective   Vital Signs  Temp:  [97.9 °F (36.6 °C)-98.1 °F (36.7 °C)] 98.1 °F (36.7 °C)  Heart Rate:  [80-90] 81  Resp:  [16-20] 18  BP: ()/(49-84) 95/64  SpO2:  [94 %-99 %] 98 %  on  Flow (L/min):  [2] 2;   Device (Oxygen Therapy): room air  Body mass index is 32.81 kg/m².  Physical Exam  Constitutional:       General: She is not in acute distress.     Appearance: She is not toxic-appearing.   Cardiovascular:      Rate and Rhythm: Normal rate and regular rhythm.      Heart sounds: Normal heart sounds.   Pulmonary:      Effort: Pulmonary effort is normal.      Breath sounds: Normal breath sounds.   Abdominal:      General: Bowel sounds are normal.      Palpations: Abdomen is soft.   Musculoskeletal:      Right lower leg: No edema.      Left lower leg: Edema present.   Skin:     Findings: Erythema and rash present.      Comments: Left lower extremity cellulitis improving  Now wrapped      Neurological:      Mental Status: She is alert.   Psychiatric:         Mood and Affect: Mood normal.         Behavior: Behavior normal.         Results Review     I reviewed the patient's new clinical results.  Results from last 7 days   Lab Units 24  0625 24  0538 24  0529 24  0723   WBC 10*3/mm3 17.91* 23.71* 18.21* 21.30*   HEMOGLOBIN g/dL 11.3* 12.5 11.2* 12.1   PLATELETS 10*3/mm3 254 288 222 209     Results from last 7 days   Lab Units 24  0625 24  0537 24  0530 24  0723   SODIUM mmol/L 141 138 141 139   POTASSIUM mmol/L 3.2* 3.7 3.8 4.1   CHLORIDE mmol/L 105 103 105 105   CO2 mmol/L 27.5 26.0 28.0 27.0    BUN mg/dL 11 12 13 14   CREATININE mg/dL 0.65 0.76 0.71 0.74   GLUCOSE mg/dL 135* 134* 126* 157*   Estimated Creatinine Clearance: 60.2 mL/min (by C-G formula based on SCr of 0.65 mg/dL).        Results from last 7 days   Lab Units 09/02/24  0625 09/01/24  0537 08/31/24  0530 08/30/24  0723   CALCIUM mg/dL 8.3* 8.3* 8.1* 8.1*           COVID19   Date Value Ref Range Status   08/22/2024 Not Detected Not Detected - Ref. Range Final   11/09/2020 Not Detected Not Detected - Ref. Range Final   10/01/2020 Not Detected Not Detected - Ref. Range Final   08/29/2020 Not Detected Not Detected - Ref. Range Final     SARS-CoV-2 PCR   Date Value Ref Range Status   09/29/2020 Not Detected Not Detected Final     Comment:     Nucleic acid specific to SARS-CoV-2 (COVID-19) virus was not detected inthis sample by the TaqPath (TM) COVID-19 Combo Kit.SARS-CoV-2 (COVID-19) nucleic acid testing performed using [employer] Aptima (R) SARS-CoV-2 Assay or [employer] TaqPath   (TM)COVID-19 Combo Kit as indicated above under Emergency Use Authorization (EUA) from the FDA. Aptima (R) and TaqPath (TM) test performancecharacteristics verified by [facility] in accordance with the FDAs Guidance Document (Policy for Diagnostic   Tests for Coronavirus Disease-2019during the Public Health Emergency) issued on March 16, 2020. The laboratory is regulated under CLIA as qualified to perform high-complexity testingUnless otherwise noted, all testing was performed at [facility],   CLIA No. [id number], [place] State Licensee No. [id number]     Glucose   Date/Time Value Ref Range Status   09/02/2024 1030 223 (H) 70 - 130 mg/dL Final   09/02/2024 0613 123 70 - 130 mg/dL Final   09/01/2024 2033 165 (H) 70 - 130 mg/dL Final   09/01/2024 1533 213 (H) 70 - 130 mg/dL Final   09/01/2024 1039 193 (H) 70 - 130 mg/dL Final   09/01/2024 0617 128 70 - 130 mg/dL Final   08/31/2024 2019 187 (H) 70 - 130 mg/dL Final       Duplex Venous Lower Extremity - Left     Normal left lower extremity venous duplex scan.    Scheduled Medications  albuterol, 2.5 mg, Nebulization, Q4H  budesonide, 0.5 mg, Nebulization, BID  cefepime, 1,000 mg, Intravenous, Q12H  cetirizine, 10 mg, Oral, Nightly  fluticasone, 1 spray, Nasal, Daily  furosemide, 20 mg, Oral, Daily  insulin lispro, 2-7 Units, Subcutaneous, 4x Daily AC & at Bedtime  metoprolol tartrate, 12.5 mg, Oral, Q12H  montelukast, 10 mg, Oral, Nightly  oxybutynin XL, 10 mg, Oral, BID  potassium chloride ER, 40 mEq, Oral, Q4H  rosuvastatin, 20 mg, Oral, Nightly  sodium chloride, 10 mL, Intravenous, Q12H  sodium chloride, 4 mL, Nebulization, BID - RT  vancomycin, 1,250 mg, Intravenous, Q24H  warfarin, 2 mg, Oral, Once per day on Tuesday Thursday  [START ON 9/3/2024] warfarin, 4 mg, Oral, Once per day on Sunday Monday Wednesday Friday Saturday    Infusions  Pharmacy to dose vancomycin,   Pharmacy to dose warfarin,     Diet  Diet: Cardiac, Diabetic; Healthy Heart (2-3 Na+); Consistent Carbohydrate; Fluid Consistency: Thin (IDDSI 0)       Assessment/Plan     Active Hospital Problems    Diagnosis  POA    **Cellulitis of left lower leg [L03.116]  Yes    COPD (chronic obstructive pulmonary disease) [J44.9]  Yes    Hypokalemia [E87.6]  Yes    Anticoagulated on Coumadin [Z79.01]  Not Applicable    CLL (chronic lymphocytic leukemia) [C91.10]  Yes    Morbid obesity [E66.01]  Yes    Chronic combined systolic (congestive) and diastolic (congestive) heart failure [I50.42]  Yes    HTN (hypertension) [I10]  Yes    Coronary artery disease [I25.10]  Yes    HLD (hyperlipidemia) [E78.5]  Yes    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       89 y.o. female admitted with Cellulitis of left lower leg.    LLE Cellulitis  - in a diabetic patient with hypogammaglobulinemia, improving slowly  - continue IV vancomycin  and cefepime given that she is chronically on keflex  - blood cultures NGTD  - venous doppler showed no DVT  - now receiving loose leg wraps to help with the edema  - will plan to transition to doxycycline and augmentin at discharge to complete 2 weeks of therapy  - she will need to follow up with her infectious disease physician, Dr. Miller, after discharge.      HTN/CAD/PAF/Chronic Combined Systolic and Diastolic CHF  - BP, HR, and volume states acceptable, no anginal symptoms  - bp has been on the low side changed coreg to metoprolol  - continue lasix  - on AC with warfarin, pharmacy dosing    Hypokalemia  -replace     Type 2 DM  - BG at goal  - holding metformin and glipizide  - continue coverage with ssi/hypoglycemia protocol     COPD  - no exacerbation  - continue current regimen, add saline nebs and mucinex to help with secretions     CLL  - has chronic leukocytosis usually 14-18k    Warfarin (home med) for DVT prophylaxis.  Full code.  Discussed with patient and nursing staff.  Anticipate discharge to SNU facility once arrangements have been made.       Amaury Messer MD  [facility]  09/02/24  13:25 EDT     I wore protective equipment throughout this patient encounter including a face mask, gloves and protective eyewear.  Hand hygiene was performed before donning protective equipment and after removal when leaving the room.

## 2024-09-02 NOTE — THERAPY TREATMENT NOTE
Patient Name: Caitlyn Longoria  : 1934    MRN: [id number]                              Today's Date: 2024       Admit Date: 2024    Visit Dx:     ICD-10-CM ICD-9-CM   1. Cellulitis of left leg  L03.116 682.6   2. Chronic anticoagulation  Z79.01 V58.61     Patient Active Problem List   Diagnosis    Neck and shoulder pain    Arthropathy of shoulder region    Carpal tunnel syndrome of left wrist    Chronic pain of both shoulders    Chronic left shoulder pain    Arthropathy of left shoulder    Dysarthria    Type 2 diabetes mellitus with atherosclerosis of aorta    Paroxysmal atrial fibrillation    HLD (hyperlipidemia)    Chronic bronchitis    Coronary artery disease    CVA (cerebral vascular accident)    HTN (hypertension)    CKD (chronic kidney disease), stage III    Chronic combined systolic (congestive) and diastolic (congestive) heart failure    Infection of prosthetic right knee joint    Stasis dermatitis of right lower extremity due to peripheral venous hypertension    Current use of long term anticoagulation    Morbid obesity    Acute respiratory failure with hypoxia    Rhinovirus    Asthma with acute exacerbation    Acute respiratory failure with hypoxemia    Viral pneumonia    Hypoxia    CLL (chronic lymphocytic leukemia)    Dyspnea    Anticoagulated on Coumadin    Osteoporotic compression fracture of spine    Pneumonia due to gram-negative bacteria    Pneumonia due to infectious organism    Bilateral carotid artery disease    Hypogammaglobulinemia    Hypogammaglobulinemia    Cellulitis of right lower extremity    Hypokalemia    Nocturnal hypoxia    COPD (chronic obstructive pulmonary disease)    Upper respiratory tract infection    COPD (chronic obstructive pulmonary disease)    Sick sinus syndrome    Anemia    Thrombocytopenia    Chronic HFrEF (heart failure with reduced ejection fraction)    Cellulitis of left lower leg     Past Medical History:   Diagnosis Date    Aortic calcification      mild, on echo 12/17/2017    Aortic regurgitation     Trace    Asthma     Atrial fibrillation     CAD (coronary artery disease)     Carpal tunnel syndrome of left wrist     Chronic combined systolic and diastolic congestive heart failure     CKD (chronic kidney disease) stage 3, GFR 30-59 ml/min     COPD (chronic obstructive pulmonary disease)     Coronary artery disease involving native coronary artery of native heart with angina pectoris     Disc degeneration, lumbar     Diverticulosis     DM type 2 (diabetes mellitus, type 2)     GERD (gastroesophageal reflux disease)     History of aneurysm     right femoral artery s/p LHC    History of blood transfusion     History of fracture     History of heart attack     History of vitamin D deficiency     Hyperlipidemia     Hypertension     Leukemia     Mild mitral regurgitation     Mitral annular calcification     12/8/2017- echo, moderate    Osteopenia     PAF (paroxysmal atrial fibrillation)     Peripheral neuropathy     Skin cancer     Left hand    Sleep apnea     bipap    SSS (sick sinus syndrome)     Stroke (cerebrum)     TIA (transient ischemic attack) 2017    Tricuspid regurgitation     Trace     Past Surgical History:   Procedure Laterality Date    BRONCHOSCOPY Bilateral 10/6/2020    Procedure: BRONCHOSCOPY with BILATERAL LUNG washings;  Surgeon: Juno Coburn MD;  Location: [facility] ENDOSCOPY;  Service: Pulmonary;  Laterality: Bilateral;  PRE: purulent bronchitis  POST: PURULENT BRONCHITIS    BRONCHOSCOPY Bilateral 10/9/2020    Procedure: BRONCHOSCOPY with washing;  Surgeon: Juno Coburn MD;  Location: [facility] ENDOSCOPY;  Service: Pulmonary;  Laterality: Bilateral;  pre/post - mucous plug      CARDIAC CATHETERIZATION      CARDIAC ELECTROPHYSIOLOGY PROCEDURE N/A 2/7/2020    Procedure: PPM generator change - dual  medtronic;  Surgeon: Kiel Field MD;  Location: [facility] CATH INVASIVE LOCATION;  Service: Cardiology;  Laterality: N/A;    CHOLECYSTECTOMY       CORONARY STENT PLACEMENT      ENDOSCOPY N/A 9/14/2022    Procedure: ESOPHAGOGASTRODUODENOSCOPY with 54fr main dilatation;  Surgeon: Enio Cota MD;  Location: [facility] ENDOSCOPY;  Service: Gastroenterology;  Laterality: N/A;  pre - dysphagia  post - s/p dilatation, watermelon stomach    HERNIA REPAIR      hital hernia    HYSTERECTOMY      PACEMAKER IMPLANTATION      REPLACEMENT TOTAL KNEE Bilateral       General Information       Row Name 09/02/24 1456          Physical Therapy Time and Intention    Document Type therapy note (daily note)  -     Mode of Treatment physical therapy  -       Row Name 09/02/24 1456          General Information    Existing Precautions/Restrictions fall  -       Row Name 09/02/24 1456          Cognition    Orientation Status (Cognition) oriented x 4  -               User Key  (r) = Recorded By, (t) = Taken By, (c) = Cosigned By      Initials Name Provider Type     Moira Saunders PTA Physical Therapist Assistant                   Mobility       Row Name 09/02/24 1456          Bed Mobility    Bed Mobility supine-sit  -     Supine-Sit [place] (Bed Mobility) standby assist  -     Assistive Device (Bed Mobility) bed rails;head of bed elevated  -       Row Name 09/02/24 1456          Sit-Stand Transfer    Sit-Stand [place] (Transfers) standby assist  -     Assistive Device (Sit-Stand Transfers) walker, 4-wheeled  -       Row Name 09/02/24 1456          Gait/Stairs (Locomotion)    [place] Level (Gait) contact guard  -     Assistive Device (Gait) walker, 4-wheeled  -     Patient was able to Ambulate yes  -     Distance in Feet (Gait) 120  -     Deviations/Abnormal Patterns (Gait) jeremi decreased;stride length decreased  -     Bilateral Gait Deviations forward flexed posture  -               User Key  (r) = Recorded By, (t) = Taken By, (c) = Cosigned By      Initials Name Provider Type    Moira Romero PTA Physical Therapist  Assistant                   Obj/Interventions    No documentation.                  Goals/Plan    No documentation.                  Clinical Impression       Row Name 09/02/24 1457          Pain    Pretreatment Pain Rating 0/10 - no pain  -     Posttreatment Pain Rating 0/10 - no pain  -       Row Name 09/02/24 1457          Positioning and Restraints    Pre-Treatment Position in bed  -     Post Treatment Position chair  -SM     In Chair reclined;call light within reach;encouraged to call for assist;exit alarm on  -               User Key  (r) = Recorded By, (t) = Taken By, (c) = Cosigned By      Initials Name Provider Type     Moira Saunders PTA Physical Therapist Assistant                   Outcome Measures       Row Name 09/02/24 1458          How much help from another person do you currently need...    Turning from your back to your side while in flat bed without using bedrails? 4  -SM     Moving from lying on back to sitting on the side of a flat bed without bedrails? 4  -SM     Moving to and from a bed to a chair (including a wheelchair)? 3  -SM     Standing up from a chair using your arms (e.g., wheelchair, bedside chair)? 4  -SM     Climbing 3-5 steps with a railing? 3  -SM     To walk in hospital room? 3  -SM     AM-PAC 6 Clicks Score (PT) 21  -     Highest Level of Mobility Goal 6 --> Walk 10 steps or more  -       Row Name 09/02/24 1458          Functional Assessment    Outcome Measure Options AM-PAC 6 Clicks Basic Mobility (PT)  -               User Key  (r) = Recorded By, (t) = Taken By, (c) = Cosigned By      Initials Name Provider Type    Moira Romero PTA Physical Therapist Assistant                                 Physical Therapy Education       Title: PT OT SLP Therapies (Done)       Topic: Physical Therapy (Done)       Point: Mobility training (Done)       Learning Progress Summary             Patient Acceptance, E,TB,D, VU,NR by  at 9/2/2024 6272     Acceptance, E, VU by RS at 8/31/2024 1610    Acceptance, E, VU,NR by MO at 8/31/2024 1538    Acceptance, E,D, NR by EB at 8/30/2024 1535    Acceptance, E, VU,DU by PH at 8/28/2024 1542    Acceptance, E,D, DU by PC at 8/27/2024 1156                         Point: Home exercise program (Done)       Learning Progress Summary             Patient Acceptance, E,TB,D, VU,NR by  at 9/2/2024 1458    Acceptance, E, VU by RS at 8/31/2024 1610    Acceptance, E, VU,NR by MO at 8/31/2024 1538    Acceptance, E,D, DU by PC at 8/27/2024 1156                         Point: Body mechanics (Done)       Learning Progress Summary             Patient Acceptance, E,TB,D, VU,NR by  at 9/2/2024 1458    Acceptance, E, VU by RS at 8/31/2024 1610    Acceptance, E, VU,NR by MO at 8/31/2024 1538    Acceptance, E,D, NR by EB at 8/30/2024 1535    Acceptance, E, VU,DU by PH at 8/28/2024 1542    Acceptance, E,D, DU by PC at 8/27/2024 1156                         Point: Precautions (Done)       Learning Progress Summary             Patient Acceptance, E,TB,D, VU,NR by  at 9/2/2024 1458    Acceptance, E, VU by RS at 8/31/2024 1610    Acceptance, E, VU,NR by MO at 8/31/2024 1538    Acceptance, E, VU,DU by PH at 8/28/2024 1542    Acceptance, E,D, DU by PC at 8/27/2024 1156                                         User Key       Initials Effective Dates Name Provider Type Discipline    PC 06/16/21 -  Deepa Thomas PT Physical Therapist PT    SM 03/07/18 -  Moira Saunders, PTA Physical Therapist Assistant PT    EB 02/14/23 -  Diana Simmons PTA Physical Therapist Assistant PT    PH 06/16/21 -  Lisy Bautista PTA Physical Therapist Assistant PT    RS 05/06/24 -  Malgorzata Christopher RN Extern Registered Nurse Nurse    MO 05/26/23 -  Pattie Garcia, PT Physical Therapist PT                  PT Recommendation and Plan     Plan of Care Reviewed With: patient  Progress: improving  Outcome Evaluation: Pt tolerated treatment well this date. Required  SBA for bed mobility and to stand. Pt ambulated 120ft w/ rollator and CGA. No LOBs noted. Pt stated she has been compliant w/ exercises and has been up to the chair w/ nsg as well.     Time Calculation:         PT Charges       Row Name 09/02/24 1502             Time Calculation    Start Time 1418  -SM      Stop Time 1445  -SM      Time Calculation (min) 27 min  -SM      PT Received On 09/02/24  -      PT - Next Appointment 09/03/24  -                User Key  (r) = Recorded By, (t) = Taken By, (c) = Cosigned By      Initials Name Provider Type     Moira Saunders PTA Physical Therapist Assistant                  Therapy Charges for Today       Code Description Service Date Service Provider Modifiers Qty    07082637484 HC GAIT TRAINING EA 15 MIN 9/2/2024 Moira Saunders PTA GP 1    [id number] HC PT THERAPEUTIC ACT EA 15 MIN 9/2/2024 Moira Saunders PTA GP 1            PT G-Codes  Outcome Measure Options: AM-PAC 6 Clicks Basic Mobility (PT)  AM-PAC 6 Clicks Score (PT): 21  AM-PAC 6 Clicks Score (OT): 15  PT Discharge Summary  Anticipated Discharge Disposition (PT): assisted living    Moira Saunders PTA  9/2/2024

## 2024-09-02 NOTE — PLAN OF CARE
Goal Outcome Evaluation:     Patient A&Ox4. Able to ambulate to the chair with a x1 assist. Medicated per orders. Plan of care ongoing.

## 2024-09-02 NOTE — PROGRESS NOTES
[facility] Clinical Pharmacy Services: Warfarin Dosing/Monitoring Consult    Caitlyn Longoria is a 89 y.o. female, estimated creatinine clearance is 60.2 mL/min (by C-G formula based on SCr of 0.65 mg/dL). weighing 84 kg (185 lb 3 oz).    Results from last 7 days   Lab Units 09/02/24  0625 09/01/24  0538 09/01/24  0537 08/31/24  0529 08/30/24  0723 08/29/24  0303   INR  3.11*  --  2.84* 2.86* 2.78* 2.65*   HEMOGLOBIN g/dL 11.3* 12.5  --  11.2* 12.1 11.2*   HEMATOCRIT % 36.1 38.9  --  35.2 37.9 35.5   PLATELETS 10*3/mm3 254 288  --  222 209 174     Prior to admission anticoagulation: warfarin 2 mg Tue, Thurs; 4 mg all other days    Hospital Anticoagulation:  Consulting provider: Giancarlo  Start date: pta  Indication: A Fib - requiring full anticoagulation  Target INR: 2 - 3  Expected duration: tbd   Bridge Therapy: No      Potential food or drug interactions: cefepime (8/27-9/8)  may enhance the anticoagulant effect of warfarin    Education complete?/Date: N/A; home medication    Assessment/Plan:  INR slightly supra therapeutic  today at 3.11    will hold dose for today and resume patient's home regimen on Tuesday ( warfarin 2mg dose)   Monitor for any signs or symptoms of bleeding    Pharmacy will continue to follow until discharge or discontinuation of warfarin.      Pattie Mckenzie, [facility]

## 2024-09-02 NOTE — PLAN OF CARE
Goal Outcome Evaluation:  Plan of Care Reviewed With: patient        Progress: improving  Outcome Evaluation: Pt tolerated treatment well this date. Required SBA for bed mobility and to stand. Pt ambulated 120ft w/ rollator and CGA. No LOBs noted. Pt stated she has been compliant w/ exercises and has been up to the chair w/ nsg as well.      Anticipated Discharge Disposition (PT): assisted living

## 2024-09-03 ENCOUNTER — READMISSION MANAGEMENT (OUTPATIENT)
Dept: CALL CENTER | Facility: HOSPITAL | Age: 89
End: 2024-09-03
Payer: MEDICARE

## 2024-09-03 VITALS
HEART RATE: 80 BPM | DIASTOLIC BLOOD PRESSURE: 69 MMHG | BODY MASS INDEX: 32.81 KG/M2 | SYSTOLIC BLOOD PRESSURE: 106 MMHG | RESPIRATION RATE: 20 BRPM | WEIGHT: 185.19 LBS | TEMPERATURE: 97.9 F | HEIGHT: 63 IN | OXYGEN SATURATION: 95 %

## 2024-09-03 LAB
ANION GAP SERPL CALCULATED.3IONS-SCNC: 9 MMOL/L (ref 5–15)
BUN SERPL-MCNC: 13 MG/DL (ref 8–23)
BUN/CREAT SERPL: 20.6 (ref 7–25)
CALCIUM SPEC-SCNC: 8.1 MG/DL (ref 8.6–10.5)
CHLORIDE SERPL-SCNC: 104 MMOL/L (ref 98–107)
CO2 SERPL-SCNC: 22 MMOL/L (ref 22–29)
CREAT SERPL-MCNC: 0.63 MG/DL (ref 0.57–1)
DEPRECATED RDW RBC AUTO: 46.8 FL (ref 37–54)
EGFRCR SERPLBLD CKD-EPI 2021: 84.9 ML/MIN/1.73
ERYTHROCYTE [DISTWIDTH] IN BLOOD BY AUTOMATED COUNT: 13 % (ref 12.3–15.4)
GLUCOSE BLDC GLUCOMTR-MCNC: 128 MG/DL (ref 70–130)
GLUCOSE BLDC GLUCOMTR-MCNC: 226 MG/DL (ref 70–130)
GLUCOSE SERPL-MCNC: 134 MG/DL (ref 65–99)
HCT VFR BLD AUTO: 36.7 % (ref 34–46.6)
HGB BLD-MCNC: 11.6 G/DL (ref 12–15.9)
INR PPP: 2.69 (ref 0.9–1.1)
LYMPHOCYTES # BLD MANUAL: 14.48 10*3/MM3 (ref 0.7–3.1)
LYMPHOCYTES NFR BLD MANUAL: 3 % (ref 5–12)
MAGNESIUM SERPL-MCNC: 1.8 MG/DL (ref 1.6–2.4)
MCH RBC QN AUTO: 31.6 PG (ref 26.6–33)
MCHC RBC AUTO-ENTMCNC: 31.6 G/DL (ref 31.5–35.7)
MCV RBC AUTO: 100 FL (ref 79–97)
MONOCYTES # BLD: 0.55 10*3/MM3 (ref 0.1–0.9)
NEUTROPHILS # BLD AUTO: 3.3 10*3/MM3 (ref 1.7–7)
NEUTROPHILS NFR BLD MANUAL: 18 % (ref 42.7–76)
NRBC BLD AUTO-RTO: 0.1 /100 WBC (ref 0–0.2)
PLAT MORPH BLD: NORMAL
PLATELET # BLD AUTO: 256 10*3/MM3 (ref 140–450)
PMV BLD AUTO: 9.7 FL (ref 6–12)
POTASSIUM SERPL-SCNC: 3.7 MMOL/L (ref 3.5–5.2)
PROTHROMBIN TIME: 28.8 SECONDS (ref 11.7–14.2)
RBC # BLD AUTO: 3.67 10*6/MM3 (ref 3.77–5.28)
RBC MORPH BLD: NORMAL
SODIUM SERPL-SCNC: 135 MMOL/L (ref 136–145)
VARIANT LYMPHS NFR BLD MANUAL: 1 % (ref 0–5)
VARIANT LYMPHS NFR BLD MANUAL: 78 % (ref 19.6–45.3)
WBC MORPH BLD: NORMAL
WBC NRBC COR # BLD AUTO: 18.33 10*3/MM3 (ref 3.4–10.8)

## 2024-09-03 PROCEDURE — 85025 COMPLETE CBC W/AUTO DIFF WBC: CPT | Performed by: INTERNAL MEDICINE

## 2024-09-03 PROCEDURE — 94799 UNLISTED PULMONARY SVC/PX: CPT

## 2024-09-03 PROCEDURE — 25010000002 CEFEPIME PER 500 MG: Performed by: STUDENT IN AN ORGANIZED HEALTH CARE EDUCATION/TRAINING PROGRAM

## 2024-09-03 PROCEDURE — 82948 REAGENT STRIP/BLOOD GLUCOSE: CPT

## 2024-09-03 PROCEDURE — 85610 PROTHROMBIN TIME: CPT | Performed by: INTERNAL MEDICINE

## 2024-09-03 PROCEDURE — 80048 BASIC METABOLIC PNL TOTAL CA: CPT | Performed by: INTERNAL MEDICINE

## 2024-09-03 PROCEDURE — 97110 THERAPEUTIC EXERCISES: CPT

## 2024-09-03 PROCEDURE — 63710000001 INSULIN LISPRO (HUMAN) PER 5 UNITS: Performed by: STUDENT IN AN ORGANIZED HEALTH CARE EDUCATION/TRAINING PROGRAM

## 2024-09-03 PROCEDURE — 83735 ASSAY OF MAGNESIUM: CPT | Performed by: STUDENT IN AN ORGANIZED HEALTH CARE EDUCATION/TRAINING PROGRAM

## 2024-09-03 PROCEDURE — 85007 BL SMEAR W/DIFF WBC COUNT: CPT | Performed by: INTERNAL MEDICINE

## 2024-09-03 RX ORDER — DOXYCYCLINE HYCLATE 50 MG/1
100 CAPSULE ORAL 2 TIMES DAILY
Qty: 28 CAPSULE | Refills: 0 | Status: SHIPPED | OUTPATIENT
Start: 2024-09-03 | End: 2024-09-10

## 2024-09-03 RX ORDER — METOPROLOL TARTRATE 25 MG/1
12.5 TABLET, FILM COATED ORAL EVERY 12 HOURS SCHEDULED
Qty: 30 TABLET | Refills: 0 | Status: SHIPPED | OUTPATIENT
Start: 2024-09-03 | End: 2024-10-03

## 2024-09-03 RX ADMIN — INSULIN LISPRO 3 UNITS: 100 INJECTION, SOLUTION INTRAVENOUS; SUBCUTANEOUS at 12:45

## 2024-09-03 RX ADMIN — FUROSEMIDE 20 MG: 20 TABLET ORAL at 07:54

## 2024-09-03 RX ADMIN — GUAIFENESIN 200 MG: 100 LIQUID ORAL at 01:00

## 2024-09-03 RX ADMIN — FLUTICASONE PROPIONATE 1 SPRAY: 50 SPRAY, METERED NASAL at 07:54

## 2024-09-03 RX ADMIN — ALBUTEROL SULFATE 2.5 MG: 2.5 SOLUTION RESPIRATORY (INHALATION) at 10:53

## 2024-09-03 RX ADMIN — OXYBUTYNIN CHLORIDE 10 MG: 10 TABLET, EXTENDED RELEASE ORAL at 07:54

## 2024-09-03 RX ADMIN — Medication 4 ML: at 07:29

## 2024-09-03 RX ADMIN — Medication 10 ML: at 07:55

## 2024-09-03 RX ADMIN — ALBUTEROL SULFATE 2.5 MG: 2.5 SOLUTION RESPIRATORY (INHALATION) at 07:19

## 2024-09-03 RX ADMIN — CEFEPIME 1000 MG: 1 INJECTION, POWDER, FOR SOLUTION INTRAMUSCULAR; INTRAVENOUS at 02:11

## 2024-09-03 RX ADMIN — GUAIFENESIN 200 MG: 100 LIQUID ORAL at 14:13

## 2024-09-03 RX ADMIN — ALBUTEROL SULFATE 2.5 MG: 2.5 SOLUTION RESPIRATORY (INHALATION) at 14:54

## 2024-09-03 RX ADMIN — METOPROLOL TARTRATE 12.5 MG: 25 TABLET, FILM COATED ORAL at 07:54

## 2024-09-03 RX ADMIN — BUDESONIDE 0.5 MG: 0.5 INHALANT RESPIRATORY (INHALATION) at 07:24

## 2024-09-03 NOTE — DISCHARGE SUMMARY
Date of Admission: 8/26/2024  Date of Discharge:  9/3/2024  Primary Care Physician: Zenaida Suarez MD     Discharge Diagnosis:  Active Hospital Problems    Diagnosis  POA    **Cellulitis of left lower leg [L03.116]  Yes    COPD (chronic obstructive pulmonary disease) [J44.9]  Yes    Hypokalemia [E87.6]  Yes    Anticoagulated on Coumadin [Z79.01]  Not Applicable    CLL (chronic lymphocytic leukemia) [C91.10]  Yes    Morbid obesity [E66.01]  Yes    Chronic combined systolic (congestive) and diastolic (congestive) heart failure [I50.42]  Yes    HTN (hypertension) [I10]  Yes    Coronary artery disease [I25.10]  Yes    HLD (hyperlipidemia) [E78.5]  Yes    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       DETAILS OF HOSPITAL STAY     Pertinent Test Results and Procedures Performed    Chest x-ray:  Unchanged pronounced left hemidiaphragm elevation. Stable   enlarged cardiac silhouette with biventricular pacemaker. No focal   consolidation. No pleural effusion or pneumothorax. Evaluation for   pleural effusion is limited on the left given hemidiaphragm elevation.   No focal osseous abnormality.     Left lower extremity Doppler negative for DVT    Hospital Course  This is an 89-year-old female who was recently admitted for treatment of URI and who came back the next day after she rapidly developed LLE cellulitis.  Please see H&P for full details of admission.  She is placed on broad-spectrum antibiotics with vancomycin and cefepime.  She has shown slow but steady improvement in broad spectrum abx.  The case was discussed with her infectious disease doctor, Dr. Miller, and he agrees with plan of antibiotics for 2 weeks total.  She will be transition to oral doxycycline and Augmentin upon discharge and then he'll see her in the office to get her back on her chronic  suppressive antibiotics.  She is now medically improved, overall stable and ready for discharge.  Patient will go to skilled nursing facility upon discharge.    Physical Exam at Discharge:  General: No acute distress, AAOx3  HEENT: EOMI, PERRL  Cardiovascular: +s1 and s2, RRR  Lungs: No rhonchi or wheezing  Abdomen: soft, nontender    Consults:   Consults       Date and Time Order Name Status Description    8/26/2024  2:15 PM [facility] (on-call MD unless specified) Details      8/22/2024  5:42 PM Inpatient Cardiology Consult Completed               Condition on Discharge: Stable, improved    Discharge Disposition  Skilled nursing facility    Discharge Medications     Discharge Medications        New Medications        Instructions Start Date   amoxicillin-clavulanate 875-125 MG per tablet  Commonly known as: AUGMENTIN   1 tablet, Oral, 2 Times Daily      doxycycline 50 MG capsule  Commonly known as: VIBRAMYCIN   100 mg, Oral, 2 Times Daily      metoprolol tartrate 25 MG tablet  Commonly known as: LOPRESSOR   12.5 mg, Oral, Every 12 Hours Scheduled             Changes to Medications        Instructions Start Date   warfarin 4 MG tablet  Commonly known as: COUMADIN  What changed: See the new instructions.   TAKE ONE-HALF (1/2) TABLET ON SUNDAY AND FRIDAY AND TAKE ONE TABLET ALL OTHER DAYS OR AS DIRECTED             Continue These Medications        Instructions Start Date   acetaminophen 325 MG tablet  Commonly known as: TYLENOL   650 mg, Oral, Every 4 Hours PRN      albuterol (2.5 MG/3ML) 0.083% nebulizer solution  Commonly known as: PROVENTIL   2.5 mg, Nebulization, Every 4 Hours      budesonide 0.5 MG/2ML nebulizer solution  Commonly known as: PULMICORT   0.5 mg, Nebulization, 2 Times Daily      calcium 500 mg vitamin D 5 mcg (200 UT) 500-5 MG-MCG tablet per tablet   1 tablet, Oral, 2 Times Daily      Cetirizine HCl 10 MG capsule   1 capsule, Oral, Daily      FASENRA SC   1 dose, Subcutaneous, Every 2 Months, Every 8  weeks      fluticasone 50 MCG/ACT nasal spray  Commonly known as: FLONASE   1 spray, Nasal, Daily      furosemide 20 MG tablet  Commonly known as: LASIX   20 mg, Oral, As Needed      glipizide 5 MG tablet  Commonly known as: GLUCOTROL   5 mg, Oral, Daily With Breakfast      loperamide 2 MG capsule  Commonly known as: IMODIUM   2 mg, Oral, 4 Times Daily PRN      melatonin 5 MG tablet tablet   5 mg, Oral, Nightly      metFORMIN  MG 24 hr tablet  Commonly known as: GLUCOPHAGE-XR   500 mg, Oral, Daily With Breakfast      montelukast 10 MG tablet  Commonly known as: SINGULAIR   10 mg, Oral, Nightly      NAC PO   1,000 mg, Oral, 2 Times Daily      oxybutynin XL 10 MG 24 hr tablet  Commonly known as: DITROPAN-XL   10 mg, Oral, 2 Times Daily      rosuvastatin 20 MG tablet  Commonly known as: CRESTOR   20 mg, Oral, Nightly             Stop These Medications      carvedilol 3.125 MG tablet  Commonly known as: COREG     cephalexin 500 MG capsule  Commonly known as: KEFLEX              Discharge Diet:   Diet Instructions       Diet: Regular/House Diet, Diabetic Diets, Cardiac Diets; Healthy Heart (2-3 Na+); Regular (IDDSI 7); Thin (IDDSI 0); Consistent Carbohydrate      Discharge Diet:  Regular/House Diet  Diabetic Diets  Cardiac Diets       Cardiac Diet: Healthy Heart (2-3 Na+)    Texture: Regular (IDDSI 7)    Fluid Consistency: Thin (IDDSI 0)    Diabetic Diet: Consistent Carbohydrate            Activity at Discharge:   Activity Instructions       Activity as Tolerated              Follow-up Appointments  Future Appointments   Date Time Provider Department Center   9/27/2024  9:10 AM Shaun Cheng MD [facility] L100 [facility]   2/20/2025  9:30 AM [facility] OP VAS RM 1 BH [facility]   2/20/2025 10:00 AM Patricia Steve APRN [facility] VS [facility]   4/11/2025  1:50 PM Mehul Miller MD [facility] ID [facility]   8/11/2025 10:00 AM [facility] [place] DEVICE CHECK [facility] CD LCG40 None   8/11/2025 10:30 AM Jonah Regalado Jr., MD [facility]  [facility] LCGKR [facility]     Additional Instructions for the Follow-ups that You Need to Schedule       Discharge Follow-up with PCP   As directed       Currently Documented PCP:    Zenaida Suarez MD    PCP Phone Number:    [phone]     Follow Up Details: 1 week        Discharge Follow-up with Specified Provider: Dr. Miller in 1-2 weeks   As directed      To: Dr. Miller in 1-2 weeks                I have examined and discussed discharge planning with the patient today.    Jv Joseph MD  09/03/24  11:00 EDT    Time: Discharge greater than 30 min

## 2024-09-03 NOTE — CASE MANAGEMENT/SOCIAL WORK
Continued Stay Note  [facility]     Patient Name: Caitlyn Longoria  MRN: [id number]  Today's Date: 9/3/2024    Admit Date: 8/26/2024    Plan: Plan [facility] for skilled care.  KENNETH Recinos RN   Discharge Plan       Row Name 09/03/24 1215       Plan    Plan Plan [facility] for skilled care.  KENNETH Recinos RN    Plan Comments Per Diamond [phone]) pt has a bed at [place] for skilled and can be accepted today.  Discharge Summary to be printed for facility per Diamond.  Discharge Summary in packet. Prescriptions sent to facility pharmacy.  Packet to RN.  . Pt states she does not have transportation to SNF.  [facility] Van arranged to transport pt with a  time of 1700.  Plan [facility] for skilled care and HD chair has been arranged. KENNETH Recinos RN                   Discharge Codes    No documentation.                 Expected Discharge Date and Time       Expected Discharge Date Expected Discharge Time    Sep 3, 2024               Narda Recinos RN

## 2024-09-03 NOTE — OUTREACH NOTE
Prep Survey      Flowsheet Row Responses   [facility] facility patient discharged from? [place]   Is LACE score < 7 ? No   Eligibility Not Eligible   What are the reasons patient is not eligible? [facility]  [[place] for skilled care]   Does the patient have one of the following disease processes/diagnoses(primary or secondary)? Other   Prep survey completed? Yes            Marleen HERMAN - Registered Nurse

## 2024-09-03 NOTE — CASE MANAGEMENT/SOCIAL WORK
Case Management Discharge Note      Final Note: Pt discharged to [place] for skilled care.  KENNETH Recinos RN    Provided Post Acute Provider List?: N/A  N/A Provider List Comment: Used [facility] recently and would like to use them again    Selected Continued Care - Admitted Since 8/26/2024       Destination Coordination complete.      Service Provider Selected Services Address Phone Fax Patient Preferred    [address], [facility] [id number] [phone] --              Durable Medical Equipment    No services have been selected for the patient.                Dialysis/Infusion    No services have been selected for the patient.                Home Medical Care    No services have been selected for the patient.                Therapy    No services have been selected for the patient.                Community Resources    No services have been selected for the patient.                Community & DME    No services have been selected for the patient.                    Transportation Services  W/C Rasheed: Tanja Iqbal    Final Discharge Disposition Code: 03 - skilled nursing facility (SNF)

## 2024-09-03 NOTE — THERAPY TREATMENT NOTE
Patient Name: Caitlyn Longoria  : 1934    MRN: [id number]                              Today's Date: 9/3/2024       Admit Date: 2024    Visit Dx:     ICD-10-CM ICD-9-CM   1. Cellulitis of left leg  L03.116 682.6   2. Chronic anticoagulation  Z79.01 V58.61   3. Cellulitis of left lower leg  L03.116 682.6   4. Morbid obesity  E66.01 278.01     Patient Active Problem List   Diagnosis    Neck and shoulder pain    Arthropathy of shoulder region    Carpal tunnel syndrome of left wrist    Chronic pain of both shoulders    Chronic left shoulder pain    Arthropathy of left shoulder    Dysarthria    Type 2 diabetes mellitus with atherosclerosis of aorta    Paroxysmal atrial fibrillation    HLD (hyperlipidemia)    Chronic bronchitis    Coronary artery disease    CVA (cerebral vascular accident)    HTN (hypertension)    CKD (chronic kidney disease), stage III    Chronic combined systolic (congestive) and diastolic (congestive) heart failure    Infection of prosthetic right knee joint    Stasis dermatitis of right lower extremity due to peripheral venous hypertension    Current use of long term anticoagulation    Morbid obesity    Acute respiratory failure with hypoxia    Rhinovirus    Asthma with acute exacerbation    Acute respiratory failure with hypoxemia    Viral pneumonia    Hypoxia    CLL (chronic lymphocytic leukemia)    Dyspnea    Anticoagulated on Coumadin    Osteoporotic compression fracture of spine    Pneumonia due to gram-negative bacteria    Pneumonia due to infectious organism    Bilateral carotid artery disease    Hypogammaglobulinemia    Hypogammaglobulinemia    Cellulitis of right lower extremity    Hypokalemia    Nocturnal hypoxia    COPD (chronic obstructive pulmonary disease)    Upper respiratory tract infection    COPD (chronic obstructive pulmonary disease)    Sick sinus syndrome    Anemia    Thrombocytopenia    Chronic HFrEF (heart failure with reduced ejection fraction)    Cellulitis of  left lower leg     Past Medical History:   Diagnosis Date    Aortic calcification     mild, on echo 12/17/2017    Aortic regurgitation     Trace    Asthma     Atrial fibrillation     CAD (coronary artery disease)     Carpal tunnel syndrome of left wrist     Chronic combined systolic and diastolic congestive heart failure     CKD (chronic kidney disease) stage 3, GFR 30-59 ml/min     COPD (chronic obstructive pulmonary disease)     Coronary artery disease involving native coronary artery of native heart with angina pectoris     Disc degeneration, lumbar     Diverticulosis     DM type 2 (diabetes mellitus, type 2)     GERD (gastroesophageal reflux disease)     History of aneurysm     right femoral artery s/p LHC    History of blood transfusion     History of fracture     History of heart attack     History of vitamin D deficiency     Hyperlipidemia     Hypertension     Leukemia     Mild mitral regurgitation     Mitral annular calcification     12/8/2017- echo, moderate    Osteopenia     PAF (paroxysmal atrial fibrillation)     Peripheral neuropathy     Skin cancer     Left hand    Sleep apnea     bipap    SSS (sick sinus syndrome)     Stroke (cerebrum)     TIA (transient ischemic attack) 2017    Tricuspid regurgitation     Trace     Past Surgical History:   Procedure Laterality Date    BRONCHOSCOPY Bilateral 10/6/2020    Procedure: BRONCHOSCOPY with BILATERAL LUNG washings;  Surgeon: Juno Coburn MD;  Location: [facility] ENDOSCOPY;  Service: Pulmonary;  Laterality: Bilateral;  PRE: purulent bronchitis  POST: PURULENT BRONCHITIS    BRONCHOSCOPY Bilateral 10/9/2020    Procedure: BRONCHOSCOPY with washing;  Surgeon: Juno Coburn MD;  Location: [facility] ENDOSCOPY;  Service: Pulmonary;  Laterality: Bilateral;  pre/post - mucous plug      CARDIAC CATHETERIZATION      CARDIAC ELECTROPHYSIOLOGY PROCEDURE N/A 2/7/2020    Procedure: PPM generator change - dual  medtronic;  Surgeon: Kiel Field MD;  Location: [facility]  CATH INVASIVE LOCATION;  Service: Cardiology;  Laterality: N/A;    CHOLECYSTECTOMY      CORONARY STENT PLACEMENT      ENDOSCOPY N/A 9/14/2022    Procedure: ESOPHAGOGASTRODUODENOSCOPY with 54fr main dilatation;  Surgeon: Enio Cota MD;  Location: [facility] ENDOSCOPY;  Service: Gastroenterology;  Laterality: N/A;  pre - dysphagia  post - s/p dilatation, watermelon stomach    HERNIA REPAIR      hital hernia    HYSTERECTOMY      PACEMAKER IMPLANTATION      REPLACEMENT TOTAL KNEE Bilateral       General Information       Row Name 09/03/24 1411          Physical Therapy Time and Intention    Document Type therapy note (daily note)  -PC     Mode of Treatment physical therapy  -PC       Row Name 09/03/24 1411          Cognition    Orientation Status (Cognition) oriented x 4  -PC               User Key  (r) = Recorded By, (t) = Taken By, (c) = Cosigned By      Initials Name Provider Type    PC Deepa Thomas, PT Physical Therapist                   Mobility       Row Name 09/03/24 1414          Bed Mobility    Bed Mobility supine-sit  -PC     Supine-Sit [place] (Bed Mobility) standby assist  -PC       Row Name 09/03/24 1414          Sit-Stand Transfer    Sit-Stand [place] (Transfers) standby assist  -PC     Assistive Device (Sit-Stand Transfers) walker, 4-wheeled  -PC       Row Name 09/03/24 1414          Gait/Stairs (Locomotion)    [place] Level (Gait) contact guard  -PC     Assistive Device (Gait) walker, 4-wheeled  -PC     Distance in Feet (Gait) 120  -PC     Deviations/Abnormal Patterns (Gait) jeremi decreased;stride length decreased;gait speed decreased  -PC     Bilateral Gait Deviations forward flexed posture  -PC     Comment, (Gait/Stairs) pt has to lean on forearms at time due to weakness, fatigue  -PC               User Key  (r) = Recorded By, (t) = Taken By, (c) = Cosigned By      Initials Name Provider Type    PC Deepa Thomas, PT Physical Therapist                   Obj/Interventions        Row Name 09/03/24 1417          Motor Skills    Therapeutic Exercise --  shoulder rolls and shoulder flexion/extension working on back thoracic extension, standing at walker and working on standing up straight with head up  -PC       Row Name 09/03/24 1417          Balance    Static Sitting Balance supervision  -PC     Dynamic Sitting Balance supervision  -PC     Position, Sitting Balance sitting edge of bed  -PC     Static Standing Balance standby assist  -PC     Dynamic Standing Balance contact guard  -PC     Position/Device Used, Standing Balance walker, 4-wheeled  -PC               User Key  (r) = Recorded By, (t) = Taken By, (c) = Cosigned By      Initials Name Provider Type    PC Deepa Thomas, PT Physical Therapist                   Goals/Plan    No documentation.                  Clinical Impression       Row Name 09/03/24 1424          Pain    Pretreatment Pain Rating 0/10 - no pain  -PC       Row Name 09/03/24 1424          Plan of Care Review    Plan of Care Reviewed With patient  -PC     Outcome Evaluation Pt cont to show steady progress with mobility, however fatigues with activity and cont to have some general weakness, leans over on walker and rests forearms on walker, pt is highly motivated  -PC       Row Name 09/03/24 1424          Positioning and Restraints    Pre-Treatment Position in bed  -PC     Post Treatment Position chair  -PC     In Chair reclined;call light within reach;encouraged to call for assist;exit alarm on  -PC               User Key  (r) = Recorded By, (t) = Taken By, (c) = Cosigned By      Initials Name Provider Type    PC Deepa Thomas, PT Physical Therapist                   Outcome Measures       Row Name 09/03/24 1426          How much help from another person do you currently need...    Turning from your back to your side while in flat bed without using bedrails? 4  -PC     Moving from lying on back to sitting on the side of a flat bed without bedrails? 4  -PC      Moving to and from a bed to a chair (including a wheelchair)? 3  -PC     Standing up from a chair using your arms (e.g., wheelchair, bedside chair)? 3  -PC     Climbing 3-5 steps with a railing? 2  -PC     To walk in hospital room? 3  -PC     AM-PAC 6 Clicks Score (PT) 19  -PC     Highest Level of Mobility Goal 6 --> Walk 10 steps or more  -PC               User Key  (r) = Recorded By, (t) = Taken By, (c) = Cosigned By      Initials Name Provider Type    Deepa Zhong, PT Physical Therapist                                 Physical Therapy Education       Title: PT OT SLP Therapies (Done)       Topic: Physical Therapy (Done)       Point: Mobility training (Done)       Learning Progress Summary             Patient Acceptance, E,D, DU by PC at 9/3/2024 1426    Acceptance, E,TB,D, VU,NR by  at 9/2/2024 1458    Acceptance, E, VU by RS at 8/31/2024 1610    Acceptance, E, VU,NR by MO at 8/31/2024 1538    Acceptance, E,D, NR by EB at 8/30/2024 1535    Acceptance, E, VU,DU by  at 8/28/2024 1542    Acceptance, E,D, DU by PC at 8/27/2024 1156                         Point: Home exercise program (Done)       Learning Progress Summary             Patient Acceptance, E,D, DU by PC at 9/3/2024 1426    Acceptance, E,TB,D, VU,NR by  at 9/2/2024 1458    Acceptance, E, VU by RS at 8/31/2024 1610    Acceptance, E, VU,NR by MO at 8/31/2024 1538    Acceptance, E,D, DU by PC at 8/27/2024 1156                         Point: Body mechanics (Done)       Learning Progress Summary             Patient Acceptance, E,D, DU by PC at 9/3/2024 1426    Acceptance, E,TB,D, VU,NR by SM at 9/2/2024 1458    Acceptance, E, VU by RS at 8/31/2024 1610    Acceptance, E, VU,NR by MO at 8/31/2024 1538    Acceptance, E,D, NR by EB at 8/30/2024 1535    Acceptance, E, VU,DU by PH at 8/28/2024 1542    Acceptance, BATSHEVA ARELLANO DU by PC at 8/27/2024 1156                         Point: Precautions (Done)       Learning Progress Summary             Patient  Acceptance, E,D, DU by PC at 9/3/2024 1426    Acceptance, E,TB,D, VU,NR by  at 9/2/2024 1458    Acceptance, E, VU by RS at 8/31/2024 1610    Acceptance, E, VU,NR by MO at 8/31/2024 1538    Acceptance, E, VU,DU by PH at 8/28/2024 1542    Acceptance, E,D, DU by PC at 8/27/2024 1156                                         User Key       Initials Effective Dates Name Provider Type Discipline     06/16/21 -  Deepa Thomas, PT Physical Therapist PT    SM 03/07/18 -  Moira Saunders, PTA Physical Therapist Assistant PT    EB 02/14/23 -  Diana Simmons, PTA Physical Therapist Assistant PT    PH 06/16/21 -  Lisy Bautista PTA Physical Therapist Assistant PT    RS 05/06/24 -  Malgorzata Christopher RN Extern Registered Nurse Nurse    MO 05/26/23 -  Pattie Garcia, PT Physical Therapist PT                  PT Recommendation and Plan  Planned Therapy Interventions (PT): balance training, bed mobility training, gait training, transfer training, strengthening  Plan of Care Reviewed With: patient  Outcome Evaluation: Pt cont to show steady progress with mobility, however fatigues with activity and cont to have some general weakness, leans over on walker and rests forearms on walker, pt is highly motivated     Time Calculation:         PT Charges       Row Name 09/03/24 1426             Time Calculation    Start Time 1341  -PC      Stop Time 1405  -PC      Time Calculation (min) 24 min  -PC      PT Received On 09/03/24  -PC      PT - Next Appointment 09/04/24  -PC                User Key  (r) = Recorded By, (t) = Taken By, (c) = Cosigned By      Initials Name Provider Type    PC Deepa Thomas, PT Physical Therapist                  Therapy Charges for Today       Code Description Service Date Service Provider Modifiers Qty    37948056253 HC PT THER PROC EA 15 MIN 9/3/2024 Deepa Thomas, PT GP 1            PT G-Codes  Outcome Measure Options: AM-PAC 6 Clicks Basic Mobility (PT)  AM-PAC 6 Clicks Score (PT): 19  AM-PAC 6  Clicks Score (OT): 15  PT Discharge Summary  Anticipated Discharge Disposition (PT): skilled nursing facility    Deepa Thomas, PT  9/3/2024

## 2024-09-03 NOTE — PLAN OF CARE
Goal Outcome Evaluation:  Plan of Care Reviewed With: patient           Outcome Evaluation: Pt cont to show steady progress with mobility, however fatigues with activity and cont to have some general weakness, leans over on walker and rests forearms on walker, pt is highly motivated      Anticipated Discharge Disposition (PT): skilled nursing facility

## 2024-09-03 NOTE — PROGRESS NOTES
[facility]: Warfarin Dosing/Monitoring Consult    Caitlyn Longoria is a 89 y.o. female, estimated creatinine clearance is 62.1 mL/min (by C-G formula based on SCr of 0.63 mg/dL). weighing 84 kg (185 lb 3 oz).    Results from last 7 days   Lab Units 09/03/24  0613 09/02/24  0625 09/01/24  0538 09/01/24  0537 08/31/24  0529 08/30/24  0723   INR  2.69* 3.11*  --  2.84* 2.86* 2.78*   HEMOGLOBIN g/dL 11.6* 11.3* 12.5  --  11.2* 12.1   HEMATOCRIT % 36.7 36.1 38.9  --  35.2 37.9   PLATELETS 10*3/mm3 256 254 288  --  222 209     Prior to admission anticoagulation: warfarin 2 mg Tue, Thurs; 4 mg all other days    Hospital Anticoagulation:  Consulting provider: Giancarlo  Start date: pta  Indication: A Fib - requiring full anticoagulation  Target INR: 2 - 3  Expected duration: tbd   Bridge Therapy: No      Potential food or drug interactions: cefepime (8/27-9/8)  may enhance the anticoagulant effect of warfarin      Assessment/Plan:  INR is therapeutic today at 2.69. Continue home regimen of warfarin 2mg Tuesdays and Thursdays, with 4mg all other days.   Monitor for any signs or symptoms of bleeding    Pharmacy will continue to follow until discharge or discontinuation of warfarin.     Nickie Hoffman, PharmD  9/3/2024

## 2024-09-03 NOTE — PLAN OF CARE
Goal Outcome Evaluation:         Pt resting in bed, Aox4 , Room air/ 2 L NC while asleep. V paced on tele. C/O cough medicated per order. IV ABX given. LLE elevated. Plan of care is ongoing.

## 2024-09-16 ENCOUNTER — OFFICE VISIT (OUTPATIENT)
Dept: WOUND CARE | Facility: HOSPITAL | Age: 89
End: 2024-09-16
Payer: MEDICARE

## 2024-09-17 ENCOUNTER — TELEPHONE (OUTPATIENT)
Dept: INFECTIOUS DISEASES | Facility: CLINIC | Age: 89
End: 2024-09-17
Payer: MEDICARE

## 2024-09-18 ENCOUNTER — TELEPHONE (OUTPATIENT)
Dept: INFECTIOUS DISEASES | Facility: CLINIC | Age: 89
End: 2024-09-18
Payer: MEDICARE

## 2024-09-20 LAB — INR PPP: 4.4

## 2024-09-23 ENCOUNTER — TELEPHONE (OUTPATIENT)
Dept: ONCOLOGY | Facility: CLINIC | Age: 89
End: 2024-09-23
Payer: MEDICARE

## 2024-09-23 ENCOUNTER — ANTICOAGULATION VISIT (OUTPATIENT)
Dept: PHARMACY | Facility: HOSPITAL | Age: 89
End: 2024-09-23
Payer: MEDICARE

## 2024-09-23 DIAGNOSIS — I48.0 PAROXYSMAL ATRIAL FIBRILLATION: Primary | ICD-10-CM

## 2024-09-23 LAB — INR PPP: 4.3

## 2024-09-25 ENCOUNTER — ANTICOAGULATION VISIT (OUTPATIENT)
Dept: PHARMACY | Facility: HOSPITAL | Age: 89
End: 2024-09-25
Payer: MEDICARE

## 2024-09-25 DIAGNOSIS — I48.0 PAROXYSMAL ATRIAL FIBRILLATION: Primary | ICD-10-CM

## 2024-09-25 LAB — INR PPP: 2

## 2024-09-27 ENCOUNTER — APPOINTMENT (OUTPATIENT)
Dept: CT IMAGING | Facility: HOSPITAL | Age: 89
DRG: 191 | End: 2024-09-27
Payer: MEDICARE

## 2024-09-27 ENCOUNTER — HOSPITAL ENCOUNTER (INPATIENT)
Facility: HOSPITAL | Age: 89
LOS: 1 days | Discharge: HOME | DRG: 191 | End: 2024-09-29
Attending: EMERGENCY MEDICINE | Admitting: INTERNAL MEDICINE
Payer: MEDICARE

## 2024-09-27 ENCOUNTER — ANTICOAGULATION VISIT (OUTPATIENT)
Dept: PHARMACY | Facility: HOSPITAL | Age: 89
End: 2024-09-27
Payer: MEDICARE

## 2024-09-27 DIAGNOSIS — E87.0 HYPERNATREMIA: ICD-10-CM

## 2024-09-27 DIAGNOSIS — J44.1 COPD WITH ACUTE EXACERBATION: Primary | ICD-10-CM

## 2024-09-27 DIAGNOSIS — D72.829 LEUKOCYTOSIS, UNSPECIFIED TYPE: ICD-10-CM

## 2024-09-27 DIAGNOSIS — I48.0 PAROXYSMAL ATRIAL FIBRILLATION: Primary | ICD-10-CM

## 2024-09-27 PROBLEM — J45.901 ACUTE EXACERBATION OF COPD WITH ASTHMA: Status: ACTIVE | Noted: 2024-09-27

## 2024-09-27 LAB
ALBUMIN SERPL-MCNC: 3.9 G/DL (ref 3.5–5.2)
ALBUMIN/GLOB SERPL: 1.6 G/DL
ALP SERPL-CCNC: 143 U/L (ref 39–117)
ALT SERPL W P-5'-P-CCNC: 26 U/L (ref 1–33)
ANION GAP SERPL CALCULATED.3IONS-SCNC: 10.9 MMOL/L (ref 5–15)
AST SERPL-CCNC: 36 U/L (ref 1–32)
B PARAPERT DNA SPEC QL NAA+PROBE: NOT DETECTED
B PERT DNA SPEC QL NAA+PROBE: NOT DETECTED
BASOPHILS # BLD MANUAL: 0.28 10*3/MM3 (ref 0–0.2)
BASOPHILS NFR BLD MANUAL: 1 % (ref 0–1.5)
BILIRUB SERPL-MCNC: 0.5 MG/DL (ref 0–1.2)
BUN SERPL-MCNC: 16 MG/DL (ref 8–23)
BUN/CREAT SERPL: 21.9 (ref 7–25)
C PNEUM DNA NPH QL NAA+NON-PROBE: NOT DETECTED
CALCIUM SPEC-SCNC: 9 MG/DL (ref 8.6–10.5)
CHLORIDE SERPL-SCNC: 109 MMOL/L (ref 98–107)
CO2 SERPL-SCNC: 26.1 MMOL/L (ref 22–29)
CREAT SERPL-MCNC: 0.73 MG/DL (ref 0.57–1)
DEPRECATED RDW RBC AUTO: 50.3 FL (ref 37–54)
EGFRCR SERPLBLD CKD-EPI 2021: 78.7 ML/MIN/1.73
ERYTHROCYTE [DISTWIDTH] IN BLOOD BY AUTOMATED COUNT: 13.9 % (ref 12.3–15.4)
FLUAV SUBTYP SPEC NAA+PROBE: NOT DETECTED
FLUBV RNA ISLT QL NAA+PROBE: NOT DETECTED
GLOBULIN UR ELPH-MCNC: 2.4 GM/DL
GLUCOSE SERPL-MCNC: 84 MG/DL (ref 65–99)
HADV DNA SPEC NAA+PROBE: NOT DETECTED
HCOV 229E RNA SPEC QL NAA+PROBE: NOT DETECTED
HCOV HKU1 RNA SPEC QL NAA+PROBE: NOT DETECTED
HCOV NL63 RNA SPEC QL NAA+PROBE: NOT DETECTED
HCOV OC43 RNA SPEC QL NAA+PROBE: NOT DETECTED
HCT VFR BLD AUTO: 39.3 % (ref 34–46.6)
HGB BLD-MCNC: 12.5 G/DL (ref 12–15.9)
HMPV RNA NPH QL NAA+NON-PROBE: NOT DETECTED
HOLD SPECIMEN: NORMAL
HOLD SPECIMEN: NORMAL
HPIV1 RNA ISLT QL NAA+PROBE: NOT DETECTED
HPIV2 RNA SPEC QL NAA+PROBE: NOT DETECTED
HPIV3 RNA NPH QL NAA+PROBE: NOT DETECTED
HPIV4 P GENE NPH QL NAA+PROBE: NOT DETECTED
INR PPP: 1.7 (ref 0.9–1.1)
INR PPP: 1.8
LYMPHOCYTES # BLD MANUAL: 20.79 10*3/MM3 (ref 0.7–3.1)
M PNEUMO IGG SER IA-ACNC: NOT DETECTED
MCH RBC QN AUTO: 32.1 PG (ref 26.6–33)
MCHC RBC AUTO-ENTMCNC: 31.8 G/DL (ref 31.5–35.7)
MCV RBC AUTO: 100.8 FL (ref 79–97)
MYELOCYTES NFR BLD MANUAL: 1 % (ref 0–0)
NEUTROPHILS # BLD AUTO: 6.38 10*3/MM3 (ref 1.7–7)
NEUTROPHILS NFR BLD MANUAL: 23 % (ref 42.7–76)
NRBC BLD AUTO-RTO: 0 /100 WBC (ref 0–0.2)
NT-PROBNP SERPL-MCNC: 789 PG/ML (ref 0–1800)
PLAT MORPH BLD: NORMAL
PLATELET # BLD AUTO: 192 10*3/MM3 (ref 140–450)
PMV BLD AUTO: 9.3 FL (ref 6–12)
POTASSIUM SERPL-SCNC: 3.6 MMOL/L (ref 3.5–5.2)
PROT SERPL-MCNC: 6.3 G/DL (ref 6–8.5)
PROTHROMBIN TIME: 20.3 SECONDS (ref 11.7–14.2)
RBC # BLD AUTO: 3.9 10*6/MM3 (ref 3.77–5.28)
RBC MORPH BLD: NORMAL
RHINOVIRUS RNA SPEC NAA+PROBE: NOT DETECTED
RSV RNA NPH QL NAA+NON-PROBE: NOT DETECTED
SARS-COV-2 RNA NPH QL NAA+NON-PROBE: NOT DETECTED
SMUDGE CELLS IN BLOOD BY LIGHT MICROSCOPY: 20 /100 WBC
SODIUM SERPL-SCNC: 146 MMOL/L (ref 136–145)
TROPONIN T SERPL HS-MCNC: 41 NG/L
VARIANT LYMPHS NFR BLD MANUAL: 1 % (ref 0–5)
VARIANT LYMPHS NFR BLD MANUAL: 74 % (ref 19.6–45.3)
WBC MORPH BLD: NORMAL
WBC NRBC COR # BLD AUTO: 27.72 10*3/MM3 (ref 3.4–10.8)
WHOLE BLOOD HOLD COAG: NORMAL
WHOLE BLOOD HOLD SPECIMEN: NORMAL

## 2024-09-27 PROCEDURE — 80053 COMPREHEN METABOLIC PANEL: CPT | Performed by: EMERGENCY MEDICINE

## 2024-09-27 PROCEDURE — 83880 ASSAY OF NATRIURETIC PEPTIDE: CPT | Performed by: EMERGENCY MEDICINE

## 2024-09-27 PROCEDURE — 99285 EMERGENCY DEPT VISIT HI MDM: CPT

## 2024-09-27 PROCEDURE — 85007 BL SMEAR W/DIFF WBC COUNT: CPT | Performed by: EMERGENCY MEDICINE

## 2024-09-27 PROCEDURE — G0378 HOSPITAL OBSERVATION PER HR: HCPCS

## 2024-09-27 PROCEDURE — 36415 COLL VENOUS BLD VENIPUNCTURE: CPT

## 2024-09-27 PROCEDURE — 85025 COMPLETE CBC W/AUTO DIFF WBC: CPT | Performed by: EMERGENCY MEDICINE

## 2024-09-27 PROCEDURE — 93010 ELECTROCARDIOGRAM REPORT: CPT | Performed by: INTERNAL MEDICINE

## 2024-09-27 PROCEDURE — 25010000002 METHYLPREDNISOLONE PER 125 MG: Performed by: EMERGENCY MEDICINE

## 2024-09-27 PROCEDURE — 71250 CT THORAX DX C-: CPT

## 2024-09-27 PROCEDURE — 93005 ELECTROCARDIOGRAM TRACING: CPT

## 2024-09-27 PROCEDURE — 0202U NFCT DS 22 TRGT SARS-COV-2: CPT | Performed by: EMERGENCY MEDICINE

## 2024-09-27 PROCEDURE — 84484 ASSAY OF TROPONIN QUANT: CPT | Performed by: EMERGENCY MEDICINE

## 2024-09-27 PROCEDURE — 85610 PROTHROMBIN TIME: CPT | Performed by: EMERGENCY MEDICINE

## 2024-09-27 PROCEDURE — 93005 ELECTROCARDIOGRAM TRACING: CPT | Performed by: EMERGENCY MEDICINE

## 2024-09-27 RX ORDER — IPRATROPIUM BROMIDE AND ALBUTEROL SULFATE 2.5; .5 MG/3ML; MG/3ML
3 SOLUTION RESPIRATORY (INHALATION) ONCE
Status: DISCONTINUED | OUTPATIENT
Start: 2024-09-27 | End: 2024-09-28

## 2024-09-27 RX ORDER — IBUPROFEN 600 MG/1
1 TABLET ORAL
Status: DISCONTINUED | OUTPATIENT
Start: 2024-09-27 | End: 2024-09-29 | Stop reason: HOSPADM

## 2024-09-27 RX ORDER — ONDANSETRON 2 MG/ML
4 INJECTION INTRAMUSCULAR; INTRAVENOUS EVERY 6 HOURS PRN
Status: DISCONTINUED | OUTPATIENT
Start: 2024-09-27 | End: 2024-09-29 | Stop reason: HOSPADM

## 2024-09-27 RX ORDER — NICOTINE POLACRILEX 4 MG
15 LOZENGE BUCCAL
Status: DISCONTINUED | OUTPATIENT
Start: 2024-09-27 | End: 2024-09-29 | Stop reason: HOSPADM

## 2024-09-27 RX ORDER — CEPHALEXIN 500 MG/1
500 CAPSULE ORAL ONCE
Status: COMPLETED | OUTPATIENT
Start: 2024-09-27 | End: 2024-09-27

## 2024-09-27 RX ORDER — ACETAMINOPHEN 325 MG/1
650 TABLET ORAL EVERY 4 HOURS PRN
Status: DISCONTINUED | OUTPATIENT
Start: 2024-09-27 | End: 2024-09-29 | Stop reason: HOSPADM

## 2024-09-27 RX ORDER — INSULIN LISPRO 100 [IU]/ML
2-7 INJECTION, SOLUTION INTRAVENOUS; SUBCUTANEOUS
Status: DISCONTINUED | OUTPATIENT
Start: 2024-09-28 | End: 2024-09-29 | Stop reason: HOSPADM

## 2024-09-27 RX ORDER — NITROGLYCERIN 0.4 MG/1
0.4 TABLET SUBLINGUAL
Status: DISCONTINUED | OUTPATIENT
Start: 2024-09-27 | End: 2024-09-29 | Stop reason: HOSPADM

## 2024-09-27 RX ORDER — BISACODYL 10 MG
10 SUPPOSITORY, RECTAL RECTAL DAILY PRN
Status: DISCONTINUED | OUTPATIENT
Start: 2024-09-27 | End: 2024-09-29 | Stop reason: HOSPADM

## 2024-09-27 RX ORDER — BISACODYL 5 MG/1
5 TABLET, DELAYED RELEASE ORAL DAILY PRN
Status: DISCONTINUED | OUTPATIENT
Start: 2024-09-27 | End: 2024-09-29 | Stop reason: HOSPADM

## 2024-09-27 RX ORDER — SODIUM CHLORIDE 0.9 % (FLUSH) 0.9 %
10 SYRINGE (ML) INJECTION AS NEEDED
Status: DISCONTINUED | OUTPATIENT
Start: 2024-09-27 | End: 2024-09-29 | Stop reason: HOSPADM

## 2024-09-27 RX ORDER — WARFARIN SODIUM 4 MG/1
4 TABLET ORAL
Status: COMPLETED | OUTPATIENT
Start: 2024-09-27 | End: 2024-09-28

## 2024-09-27 RX ORDER — IPRATROPIUM BROMIDE AND ALBUTEROL SULFATE 2.5; .5 MG/3ML; MG/3ML
3 SOLUTION RESPIRATORY (INHALATION)
Status: DISCONTINUED | OUTPATIENT
Start: 2024-09-27 | End: 2024-09-28

## 2024-09-27 RX ORDER — POLYETHYLENE GLYCOL 3350 17 G/17G
17 POWDER, FOR SOLUTION ORAL DAILY PRN
Status: DISCONTINUED | OUTPATIENT
Start: 2024-09-27 | End: 2024-09-29 | Stop reason: HOSPADM

## 2024-09-27 RX ORDER — ONDANSETRON 4 MG/1
4 TABLET, ORALLY DISINTEGRATING ORAL EVERY 6 HOURS PRN
Status: DISCONTINUED | OUTPATIENT
Start: 2024-09-27 | End: 2024-09-29 | Stop reason: HOSPADM

## 2024-09-27 RX ORDER — METHYLPREDNISOLONE SODIUM SUCCINATE 125 MG/2ML
125 INJECTION, POWDER, LYOPHILIZED, FOR SOLUTION INTRAMUSCULAR; INTRAVENOUS ONCE
Status: COMPLETED | OUTPATIENT
Start: 2024-09-27 | End: 2024-09-27

## 2024-09-27 RX ORDER — IPRATROPIUM BROMIDE AND ALBUTEROL SULFATE 2.5; .5 MG/3ML; MG/3ML
3 SOLUTION RESPIRATORY (INHALATION) EVERY 6 HOURS PRN
Status: DISCONTINUED | OUTPATIENT
Start: 2024-09-27 | End: 2024-09-29

## 2024-09-27 RX ORDER — DEXTROSE MONOHYDRATE 25 G/50ML
25 INJECTION, SOLUTION INTRAVENOUS
Status: DISCONTINUED | OUTPATIENT
Start: 2024-09-27 | End: 2024-09-29 | Stop reason: HOSPADM

## 2024-09-27 RX ORDER — AMOXICILLIN 250 MG
2 CAPSULE ORAL 2 TIMES DAILY PRN
Status: DISCONTINUED | OUTPATIENT
Start: 2024-09-27 | End: 2024-09-29 | Stop reason: HOSPADM

## 2024-09-27 RX ADMIN — METHYLPREDNISOLONE SODIUM SUCCINATE 125 MG: 125 INJECTION INTRAMUSCULAR; INTRAVENOUS at 23:51

## 2024-09-27 RX ADMIN — CEPHALEXIN 500 MG: 500 CAPSULE ORAL at 23:51

## 2024-09-27 NOTE — ED PROVIDER NOTES
" EMERGENCY DEPARTMENT ENCOUNTER  Room Number:  10/10  PCP: Zenaida Suarez MD  Independent Historians: Patient and Family      HPI:  Chief Complaint: had concerns including Shortness of Breath and Cough.     A complete HPI/ROS/PMH/PSH/SH/FH are unobtainable due to: None    Chronic or social conditions impacting patient care (Social Determinants of Health): None      Context: Caitlyn Longoria is a 89 y.o. female with a medical history of diabetes, CAD, hypertension, paroxysmal atrial fibrillation, asthma and sleep apnea who presents to the ED c/o acute cough and congestion with dyspnea that has been progressively worsening over the past 3 days.  Patient went to an urgent care center for initial evaluation today but was advised to come here for further workup given her complaints and comorbidities.  Her visiting nurse came to her house earlier today.  She helped her perform a saline neb therapy.  Patient says that she \"hacked up\" some large mucous and phlegm after that but it did not have any yellow color or blood in it.  Since then she is continue to have some persistent cough and congestion.  She denies any fevers.  She says she has been on oral antibiotics, Keflex, every day as directed.  But she says that that is for her knee.  She is also receiving care for a wound on her left foot.      Review of prior external notes (non-ED) -and- Review of prior external test results outside of this encounter: I independently reviewed the hospitalist discharge summary from September 3, 2024.  At that time, she was admitted for left lower extremity cellulitis.  She had consultation with infectious disease specialist.  Transition from IV antibiotics to oral doxycycline and Augmentin.    Prescription drug monitoring program review: ELIOT reviewed by Oscar Ibanez MD, Layo Nicole MD       PAST MEDICAL HISTORY  Active Ambulatory Problems     Diagnosis Date Noted    Neck and shoulder pain 03/24/2017    " Arthropathy of shoulder region 03/24/2017    Carpal tunnel syndrome of left wrist 03/24/2017    Chronic pain of both shoulders 06/27/2017    Chronic left shoulder pain 12/05/2017    Arthropathy of left shoulder 12/05/2017    Dysarthria 12/07/2017    Type 2 diabetes mellitus with atherosclerosis of aorta 12/07/2017    Paroxysmal atrial fibrillation 12/07/2017    HLD (hyperlipidemia) 12/07/2017    Chronic bronchitis 12/07/2017    Coronary artery disease 12/07/2017    CVA (cerebral vascular accident) 12/10/2017    HTN (hypertension) 01/14/2018    CKD (chronic kidney disease), stage III 01/14/2018    Chronic combined systolic (congestive) and diastolic (congestive) heart failure 01/15/2018    Infection of prosthetic right knee joint 01/17/2018    Stasis dermatitis of right lower extremity due to peripheral venous hypertension 04/28/2018    Current use of long term anticoagulation 04/28/2018    Morbid obesity 02/08/2019    Acute respiratory failure with hypoxia 09/16/2019    Rhinovirus 02/11/2020    Asthma with acute exacerbation 02/11/2020    Acute respiratory failure with hypoxemia 02/12/2020    Viral pneumonia 04/23/2020    Hypoxia 08/29/2020    CLL (chronic lymphocytic leukemia) 08/31/2020    Dyspnea 09/29/2020    Anticoagulated on Coumadin     Osteoporotic compression fracture of spine 09/30/2020    Pneumonia due to gram-negative bacteria 10/02/2020    Pneumonia due to infectious organism 09/29/2020    Bilateral carotid artery disease 10/28/2020    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    Cellulitis of right lower extremity 04/14/2024    Hypokalemia 04/15/2024    Nocturnal hypoxia 04/15/2024    COPD (chronic obstructive pulmonary disease) 04/15/2024    Upper respiratory tract infection 08/25/2024    COPD (chronic obstructive pulmonary disease) 08/25/2024    Sick sinus syndrome 08/25/2024    Anemia 08/25/2024    Thrombocytopenia 08/25/2024    Chronic HFrEF (heart failure with reduced  ejection fraction) 08/25/2024    Cellulitis of left lower leg 08/26/2024     Resolved Ambulatory Problems     Diagnosis Date Noted    Hypernatremia 01/15/2018    Shortness of breath 04/28/2020    Shortness of breath 08/22/2024    Hypokalemia 08/25/2024    Hypomagnesemia 08/25/2024    Hypocalcemia 08/25/2024     Past Medical History:   Diagnosis Date    Aortic calcification     Aortic regurgitation     Asthma     Atrial fibrillation     CAD (coronary artery disease)     Chronic combined systolic and diastolic congestive heart failure     CKD (chronic kidney disease) stage 3, GFR 30-59 ml/min     Coronary artery disease involving native coronary artery of native heart with angina pectoris     Disc degeneration, lumbar     Diverticulosis     DM type 2 (diabetes mellitus, type 2)     GERD (gastroesophageal reflux disease)     History of aneurysm     History of blood transfusion     History of fracture     History of heart attack     History of vitamin D deficiency     Hyperlipidemia     Hypertension     Leukemia     Mild mitral regurgitation     Mitral annular calcification     Osteopenia     PAF (paroxysmal atrial fibrillation)     Peripheral neuropathy     Skin cancer     Sleep apnea     SSS (sick sinus syndrome)     Stroke (cerebrum)     TIA (transient ischemic attack) 2017    Tricuspid regurgitation          PAST SURGICAL HISTORY  Past Surgical History:   Procedure Laterality Date    BRONCHOSCOPY Bilateral 10/6/2020    Procedure: BRONCHOSCOPY with BILATERAL LUNG washings;  Surgeon: Juno Coburn MD;  Location: [facility] ENDOSCOPY;  Service: Pulmonary;  Laterality: Bilateral;  PRE: purulent bronchitis  POST: PURULENT BRONCHITIS    BRONCHOSCOPY Bilateral 10/9/2020    Procedure: BRONCHOSCOPY with washing;  Surgeon: Juno Coburn MD;  Location: [facility] ENDOSCOPY;  Service: Pulmonary;  Laterality: Bilateral;  pre/post - mucous plug      CARDIAC CATHETERIZATION      CARDIAC ELECTROPHYSIOLOGY PROCEDURE N/A 2/7/2020     Procedure: PPM generator change - dual  medtronic;  Surgeon: Kiel Field MD;  Location: [facility] CATH INVASIVE LOCATION;  Service: Cardiology;  Laterality: N/A;    CHOLECYSTECTOMY      CORONARY STENT PLACEMENT      ENDOSCOPY N/A 9/14/2022    Procedure: ESOPHAGOGASTRODUODENOSCOPY with 54fr main dilatation;  Surgeon: Enio Cota MD;  Location:  [facility] ENDOSCOPY;  Service: Gastroenterology;  Laterality: N/A;  pre - dysphagia  post - s/p dilatation, watermelon stomach    HERNIA REPAIR      hital hernia    HYSTERECTOMY      PACEMAKER IMPLANTATION      REPLACEMENT TOTAL KNEE Bilateral          FAMILY HISTORY  Family History   Problem Relation Age of Onset    Heart disease Mother     Hypertension Mother     Colon polyps Mother     Heart disease Father     Hypertension Father     Stroke Father     Hypertension Brother     Heart disease Brother     Diabetes Niece     Colon cancer Sister     Hypertension Sister     Hypertension Daughter     Hypertension Son     Hypertension Maternal Aunt     Hypertension Maternal Grandmother     Hypertension Maternal Grandfather     Hypertension Paternal Grandmother     Hypertension Paternal Grandfather          SOCIAL HISTORY  Social History     Socioeconomic History    Marital status: Single   Tobacco Use    Smoking status: Never     Passive exposure: Never    Smokeless tobacco: Never    Tobacco comments:     caffeine use- soda   Vaping Use    Vaping status: Never Used   Substance and Sexual Activity    Alcohol use: Never    Drug use: No    Sexual activity: Defer         ALLERGIES  Accupril [quinapril hcl], Ahist [chlorpheniramine], Clarithromycin, Esomeprazole, Latex, Levalbuterol, Levocetirizine, Lipitor [atorvastatin], Omeprazole, Pravachol [pravastatin], Sulindac, Valdecoxib, Chlorcyclizine, Diclofenac sodium, Diphenhydramine, Lodine [etodolac], and Sulfa antibiotics      REVIEW OF SYSTEMS  Review of Systems  Included in HPI  All systems reviewed and negative except for  those discussed in HPI.      PHYSICAL EXAM    I have reviewed the triage vital signs and nursing notes.    ED Triage Vitals   Temp Heart Rate Resp BP SpO2   09/27/24 1803 09/27/24 1803 09/27/24 1803 09/27/24 1810 09/27/24 1803   98 °F (36.7 °C) 93 16 124/86 97 %      Temp src Heart Rate Source Patient Position BP Location FiO2 (%)   09/27/24 1803 09/27/24 1803 09/27/24 1810 09/27/24 1810 --   Tympanic Monitor Sitting Left arm        Physical Exam  GENERAL: alert, no acute distress  SKIN: Warm, dry, no rashes  HENT: Normocephalic, atraumatic  EYES: no scleral icterus, normal conjunctivae  CV: regular rhythm, regular rate, normal perfusion  RESPIRATORY: normal effort, breath sounds are diminished at the bilateral bases.  There are scattered rhonchi notable bilaterally.  No stridor.  ABDOMEN: soft, nondistended, nontender  MUSCULOSKELETAL: no deformity.  The bilateral lower extremities have compression stockings and she is wearing postop shoes.  No significant asymmetry notable between the bilateral ankles.  NEURO: alert, moves all extremities, follows commands      LAB RESULTS  Recent Results (from the past 24 hour(s))   Protime-INR    Collection Time: 09/27/24 12:00 AM    Specimen: Blood   Result Value Ref Range    INR 1.80    Covid-19 + Flu A&B AG, Veritor    Collection Time: 09/27/24  5:32 PM    Specimen: Swab   Result Value Ref Range    SARS Antigen Not Detected Not Detected, Presumptive Negative    Influenza A Antigen CHELSY Not Detected Not Detected    Influenza B Antigen CHELSY Not Detected Not Detected    Internal Control Passed Passed    Lot Number [id number]     Expiration Date 10/23/25    Comprehensive Metabolic Panel    Collection Time: 09/27/24  6:08 PM    Specimen: Arm, Right; Blood   Result Value Ref Range    Glucose 84 65 - 99 mg/dL    BUN 16 8 - 23 mg/dL    Creatinine 0.73 0.57 - 1.00 mg/dL    Sodium 146 (H) 136 - 145 mmol/L    Potassium 3.6 3.5 - 5.2 mmol/L    Chloride 109 (H) 98 - 107 mmol/L    CO2 26.1  22.0 - 29.0 mmol/L    Calcium 9.0 8.6 - 10.5 mg/dL    Total Protein 6.3 6.0 - 8.5 g/dL    Albumin 3.9 3.5 - 5.2 g/dL    ALT (SGPT) 26 1 - 33 U/L    AST (SGOT) 36 (H) 1 - 32 U/L    Alkaline Phosphatase 143 (H) 39 - 117 U/L    Total Bilirubin 0.5 0.0 - 1.2 mg/dL    Globulin 2.4 gm/dL    A/G Ratio 1.6 g/dL    BUN/Creatinine Ratio 21.9 7.0 - 25.0    Anion Gap 10.9 5.0 - 15.0 mmol/L    eGFR 78.7 >60.0 mL/min/1.73   BNP    Collection Time: 09/27/24  6:08 PM    Specimen: Arm, Right; Blood   Result Value Ref Range    proBNP 789.0 0.0 - 1,800.0 pg/mL   Single High Sensitivity Troponin T    Collection Time: 09/27/24  6:08 PM    Specimen: Arm, Right; Blood   Result Value Ref Range    HS Troponin T 41 (H) <14 ng/L   Protime-INR    Collection Time: 09/27/24  6:08 PM    Specimen: Arm, Right; Blood   Result Value Ref Range    Protime 20.3 (H) 11.7 - 14.2 Seconds    INR 1.70 (H) 0.90 - 1.10   Green Top (Gel)    Collection Time: 09/27/24  6:08 PM   Result Value Ref Range    Extra Tube Hold for add-ons.    Lavender Top    Collection Time: 09/27/24  6:08 PM   Result Value Ref Range    Extra Tube hold for add-on    Gold Top - SST    Collection Time: 09/27/24  6:08 PM   Result Value Ref Range    Extra Tube Hold for add-ons.    Light Blue Top    Collection Time: 09/27/24  6:08 PM   Result Value Ref Range    Extra Tube Hold for add-ons.    CBC Auto Differential    Collection Time: 09/27/24  6:08 PM    Specimen: Arm, Right; Blood   Result Value Ref Range    WBC 27.72 (H) 3.40 - 10.80 10*3/mm3    RBC 3.90 3.77 - 5.28 10*6/mm3    Hemoglobin 12.5 12.0 - 15.9 g/dL    Hematocrit 39.3 34.0 - 46.6 %    .8 (H) 79.0 - 97.0 fL    MCH 32.1 26.6 - 33.0 pg    MCHC 31.8 31.5 - 35.7 g/dL    RDW 13.9 12.3 - 15.4 %    RDW-SD 50.3 37.0 - 54.0 fl    MPV 9.3 6.0 - 12.0 fL    Platelets 192 140 - 450 10*3/mm3    nRBC 0.0 0.0 - 0.2 /100 WBC   Manual Differential    Collection Time: 09/27/24  6:08 PM    Specimen: Arm, Right; Blood   Result Value Ref Range     Neutrophil % 23.0 (L) 42.7 - 76.0 %    Lymphocyte % 74.0 (H) 19.6 - 45.3 %    Basophil % 1.0 0.0 - 1.5 %    Myelocyte % 1.0 (H) 0.0 - 0.0 %    Atypical Lymphocyte % 1.0 0.0 - 5.0 %    Neutrophils Absolute 6.38 1.70 - 7.00 10*3/mm3    Lymphocytes Absolute 20.79 (H) 0.70 - 3.10 10*3/mm3    Basophils Absolute 0.28 (H) 0.00 - 0.20 10*3/mm3    Smudge Cells 20.0 /100 WBC    RBC Morphology Normal Normal    WBC Morphology Normal Normal    Platelet Morphology Normal Normal   ECG 12 Lead ED Triage Standing Order; SOA    Collection Time: 09/27/24  6:08 PM   Result Value Ref Range    QT Interval 316 ms    QTC Interval 372 ms   Respiratory Panel PCR w/COVID-19(SARS-CoV-2) [facility]-House, NP Swab in UTM/VTM, 2 HR TAT - Swab, Nasopharynx    Collection Time: 09/27/24  6:39 PM    Specimen: Nasopharynx; Swab   Result Value Ref Range    ADENOVIRUS, PCR Not Detected Not Detected    Coronavirus 229E Not Detected Not Detected    Coronavirus HKU1 Not Detected Not Detected    Coronavirus NL63 Not Detected Not Detected    Coronavirus OC43 Not Detected Not Detected    COVID19 Not Detected Not Detected - Ref. Range    Human Metapneumovirus Not Detected Not Detected    Human Rhinovirus/Enterovirus Not Detected Not Detected    Influenza A PCR Not Detected Not Detected    Influenza B PCR Not Detected Not Detected    Parainfluenza Virus 1 Not Detected Not Detected    Parainfluenza Virus 2 Not Detected Not Detected    Parainfluenza Virus 3 Not Detected Not Detected    Parainfluenza Virus 4 Not Detected Not Detected    RSV, PCR Not Detected Not Detected    Bordetella pertussis pcr Not Detected Not Detected    Bordetella parapertussis PCR Not Detected Not Detected    Chlamydophila pneumoniae PCR Not Detected Not Detected    Mycoplasma pneumo by PCR Not Detected Not Detected         RADIOLOGY  CT Chest Without Contrast Diagnostic    Result Date: 9/27/2024  CT CHEST WITHOUT CONTRAST  HISTORY: Shortness of breath  COMPARISON:  September 29, 2020  TECHNIQUE: Axial CT imaging was obtained through the thorax. No IV contrast was administered.  FINDINGS: Patient is noted to have areas of parenchymal scarring within both lungs. Appearance is similar to the prior exam. There are background changes of COPD. There is compression deformity which is noted at T5 and T11. T5 compression deformity was present on prior exam, although height loss has worsened. Deformity of the inferior endplate of T11 is new when compared to prior exam, but still may be chronic. MRI would be more sensitive for characterization. There is stable elevation of the left hemidiaphragm. There is probably a nodule within the right lobe of the thyroid gland, measuring approximately 7 mm. Trachea and esophagus are within normal limits. There are coronary artery calcifications. Left-sided pacemaker is noted. There is enlargement of the main pulmonary artery, which can be seen in the setting of pulmonary arterial hypertension. Thoracic aorta is normal in caliber. There are prominent mediastinal lymph nodes. For example, a precarinal node measures up to 1.6 cm. However, this appears stable compared to prior exam. Many of the nodes are calcified. Images through the upper abdomen demonstrate multiple low-attenuation lesions within the spleen. For example, one measures up to 1.6 cm. This 1 was actually present on the prior CT from September 2020, but appears larger. Previously, it measured 1.2 cm. The remaining lesions were not clearly visible. Renal cysts noted on the right kidney. Pancreas is mildly atrophic. Gallbladder is absent. Prominent portacaval node is stable.       1. No definite acute infiltrates are seen. Background changes of COPD, with chronic parenchymal scarring noted. 2. Multiple low-attenuation lesions noted throughout the spleen. These are indeterminate. They are new or increased in size when compared to prior exam. Neoplastic involvement is not excluded. Correlation  with history is recommended. Radiation dose reduction techniques were utilized, including automated exposure control and exposure modulation based on body size.   This report was finalized on 9/27/2024 10:11 PM by Dr. Heidi Garza M.D on Workstation: [id number]      XR Chest 2 View    Result Date: 9/27/2024  XR CHEST 2 VW-  INDICATION: Shortness of breath and cough  COMPARISON: Chest radiographs dating back to 12/7/2017      Stable left hemidiaphragm elevation. No focal consolidation. No pleural effusion or pneumothorax. Cardiac silhouette remains at the upper limits of normal with dual-chamber pacemaker. No focal osseous abnormality.  This report was finalized on 9/27/2024 5:42 PM by Dr. Sergey Crump M.D on Workstation: [id number]         MEDICATIONS GIVEN IN ER  Medications   sodium chloride 0.9 % flush 10 mL (has no administration in time range)   cephalexin (KEFLEX) capsule 500 mg (has no administration in time range)   warfarin (COUMADIN) tablet 4 mg (has no administration in time range)   methylPREDNISolone sodium succinate (SOLU-Medrol) injection 125 mg (has no administration in time range)   ipratropium-albuterol (DUO-NEB) nebulizer solution 3 mL (has no administration in time range)         ORDERS PLACED DURING THIS VISIT:  Orders Placed This Encounter   Procedures    Respiratory Panel PCR w/COVID-19(SARS-CoV-2) [facility] In-House, NP Swab in UTM/VTM, 2 HR TAT - Swab, Nasopharynx    CT Chest Without Contrast Diagnostic    [facility] Draw    Comprehensive Metabolic Panel    BNP    Single High Sensitivity Troponin T    Protime-INR    CBC Auto Differential    Manual Differential    NPO Diet NPO Type: Strict NPO    Undress & Gown    Continuous Pulse Oximetry    Vital Signs    LHA (on-call MD unless specified) Details    Oxygen Therapy- Nasal Cannula; Titrate 1-6 LPM Per SpO2; 90 - 95%    POC Protime / INR    ECG 12 Lead ED Triage Standing Order; SOA    Insert Peripheral IV    Initiate  Observation Status    CBC & Differential    Green Top (Gel)    Lavender Top    Gold Top - SST    Light Blue Top         OUTPATIENT MEDICATION MANAGEMENT:  Current Facility-Administered Medications Ordered in Epic   Medication Dose Route Frequency Provider Last Rate Last Admin    cephalexin (KEFLEX) capsule 500 mg  500 mg Oral Once Layo Nicole MD        ipratropium-albuterol (DUO-NEB) nebulizer solution 3 mL  3 mL Nebulization Once Layo Nicole MD        methylPREDNISolone sodium succinate (SOLU-Medrol) injection 125 mg  125 mg Intravenous Once Layo Nicole MD        sodium chloride 0.9 % flush 10 mL  10 mL Intravenous PRN Layo Nicole MD        warfarin (COUMADIN) tablet 4 mg  4 mg Oral Once Layo Nicole MD         Current Outpatient Medications Ordered in Epic   Medication Sig Dispense Refill    acetaminophen (TYLENOL) 325 MG tablet Take 2 tablets by mouth Every 4 (Four) Hours As Needed for Mild Pain .      Acetylcysteine (NAC PO) Take 1,000 mg by mouth 2 (Two) Times a Day.      albuterol (PROVENTIL) (2.5 MG/3ML) 0.083% nebulizer solution Take 2.5 mg by nebulization Every 4 (Four) Hours.      Benralizumab (FASENRA SC) Inject 1 dose under the skin into the appropriate area as directed Every 2 (Two) Months. Every 8 weeks      budesonide (PULMICORT) 0.5 MG/2ML nebulizer solution Take 2 mL by nebulization 2 (Two) Times a Day.      Calcium Carbonate-Vitamin D (calcium 500 mg vitamin D 5 mcg, 200 UT,) 500-5 MG-MCG tablet per tablet Take 1 tablet by mouth 2 (Two) Times a Day. 60 tablet 3    Cetirizine HCl 10 MG capsule Take 1 capsule by mouth Daily.      fluticasone (FLONASE) 50 MCG/ACT nasal spray 1 spray into the nostril(s) as directed by provider Daily.      furosemide (LASIX) 20 MG tablet Take 1 tablet by mouth As Needed (For weight gain of greater than 2 pounds in 1 day or 5 pounds in 1 week). (Patient taking differently: Take 1 tablet by mouth Daily.) 30 tablet 11    glipizide (GLUCOTROL) 5 MG  tablet Take 1 tablet by mouth Daily With Breakfast.      loperamide (IMODIUM) 2 MG capsule Take 1 capsule by mouth 4 (Four) Times a Day As Needed for Diarrhea.      melatonin 5 MG tablet tablet Take 1 tablet by mouth Every Night.      metFORMIN ER (GLUCOPHAGE-XR) 500 MG 24 hr tablet Take 1 tablet by mouth Daily With Breakfast.      metoprolol tartrate (LOPRESSOR) 25 MG tablet Take 0.5 tablets by mouth Every 12 (Twelve) Hours for 30 days. 30 tablet 0    montelukast (SINGULAIR) 10 MG tablet Take 1 tablet by mouth Every Night.      oxybutynin XL (DITROPAN-XL) 10 MG 24 hr tablet Take 1 tablet by mouth 2 (Two) Times a Day.      rosuvastatin (CRESTOR) 20 MG tablet Take 1 tablet by mouth Every Night.      warfarin (COUMADIN) 4 MG tablet TAKE ONE-HALF (1/2) TABLET ON SUNDAY AND FRIDAY AND TAKE ONE TABLET ALL OTHER DAYS OR AS DIRECTED (Patient taking differently: Take  by mouth See Admin Instructions. TAKE ONE-HALF (1/2) TABLET (2 MG) ON TUESDAYS AND THURSDAYS AND TAKE ONE TABLET (4 MG) ALL OTHER DAYS OR AS DIRECTED) 80 tablet 1         PROCEDURES  Procedures            PROGRESS, DATA ANALYSIS, CONSULTS, AND MEDICAL DECISION MAKING  All labs have been independently interpreted by me.  All radiology studies have been reviewed by me. All EKG's have been independently viewed and interpreted by me.  Discussion below represents my analysis of pertinent findings related to patient's condition, differential diagnosis, treatment plan and final disposition.    Differential diagnosis includes but is not limited to CHF exacerbation, pneumonia, viral URI, pneumothorax.    Clinical Scores:                   ED Course as of 09/27/24 2318   Fri Sep 27, 2024   1828 WBC(!): 27.72 [CYNTHIA]   1843 EKG         EKG time/Interp time: 1808/1830  Rhythm/Rate: Atrial fibrillation with ventricular paced complexes, 83 bpm  P waves and CA: None  QRS, axis: Wide-complex, left axis deviation  ST and T waves: Interpretation is limited because the rhythm is  paced.  No significant change in overall appearance when compared to previous exam on file from August 22, 2024.  Independently interpreted by me contemporaneously with treatment   [CYNTHIA]   1844 HS Troponin T(!): 41 [CYNTHIA]   2005 Respiratory viral panel is unremarkable. [CYNTHIA]   2315 I discussed with KRISTIN Montano, from [facility] about this patient.  She agrees to admit her to the hospitalist service for further medical management tonight on behalf of of Dr. Ramirez. [CYNTHIA]   2316 I independently interpreted the CT chest without contrast study and my findings are: No pneumothorax or infiltrate. [CYNTHIA]   2317 No evidence of pneumonia identifiable right now.  She is already on oral antibiotics for her left lower extremity and I will continue that regimen for now.  I am not sure why her leukocytosis is more pronounced today than in previous visits.  She has not been on any recent steroids.  Nevertheless, I do think she would benefit from steroids since she is exhibiting some signs of COPD exacerbation.  She is agreeable with plan for overnight care and continued DuoNeb therapy for her respiratory complaints. [CYNTHIA]      ED Course User Index  [CYNTHIA] Layo Nicole MD             AS OF 23:18 EDT VITALS:    BP - 132/81  HR - 82  TEMP - 98 °F (36.7 °C) (Tympanic)  O2 SATS - 98%    COMPLEXITY OF CARE  The patient requires admission.      DIAGNOSIS  Final diagnoses:   COPD with acute exacerbation   Leukocytosis, unspecified type   Hypernatremia         DISPOSITION  ED Disposition       ED Disposition   Decision to Admit    Condition   --    Comment   Level of Care: Telemetry [5]   Diagnosis: Acute exacerbation of COPD with asthma [455084]   Admitting Physician: FABIENNE RAMIREZ [[id number]]   Attending Physician: FABIENNE RAMIREZ [[id number]]                  Please note that portions of this document were completed with a voice recognition program.    Note Disclaimer: At [facility], we believe that sharing information builds trust and better  relationships. You are receiving this note because you recently visited [facility]. It is possible you will see health information before a provider has talked with you about it. This kind of information can be easy to misunderstand. To help you fully understand what it means for your health, we urge you to discuss this note with your provider.         Layo Nicole MD  09/27/24 [id number]

## 2024-09-28 LAB
ANION GAP SERPL CALCULATED.3IONS-SCNC: 12.2 MMOL/L (ref 5–15)
ARTERIAL PATENCY WRIST A: POSITIVE
ATMOSPHERIC PRESS: 734.3 MMHG
BASE EXCESS BLDA CALC-SCNC: 1.6 MMOL/L (ref 0–2)
BDY SITE: ABNORMAL
BUN SERPL-MCNC: 14 MG/DL (ref 8–23)
BUN/CREAT SERPL: 25.9 (ref 7–25)
CALCIUM SPEC-SCNC: 8.4 MG/DL (ref 8.6–10.5)
CHLORIDE SERPL-SCNC: 107 MMOL/L (ref 98–107)
CO2 BLDA-SCNC: 26.3 MMOL/L (ref 23–27)
CO2 SERPL-SCNC: 21.8 MMOL/L (ref 22–29)
CREAT SERPL-MCNC: 0.54 MG/DL (ref 0.57–1)
D-LACTATE SERPL-SCNC: 1.1 MMOL/L (ref 0.5–2)
DEPRECATED RDW RBC AUTO: 51 FL (ref 37–54)
DEVICE COMMENT: ABNORMAL
EGFRCR SERPLBLD CKD-EPI 2021: 88.1 ML/MIN/1.73
ERYTHROCYTE [DISTWIDTH] IN BLOOD BY AUTOMATED COUNT: 13.4 % (ref 12.3–15.4)
GAS FLOW AIRWAY: 2 LPM
GLUCOSE BLDC GLUCOMTR-MCNC: 145 MG/DL (ref 70–130)
GLUCOSE BLDC GLUCOMTR-MCNC: 235 MG/DL (ref 70–130)
GLUCOSE BLDC GLUCOMTR-MCNC: 237 MG/DL (ref 70–130)
GLUCOSE BLDC GLUCOMTR-MCNC: 241 MG/DL (ref 70–130)
GLUCOSE SERPL-MCNC: 133 MG/DL (ref 65–99)
HCO3 BLDA-SCNC: 25.2 MMOL/L (ref 22–28)
HCT VFR BLD AUTO: 38.3 % (ref 34–46.6)
HEMODILUTION: NO
HGB BLD-MCNC: 11.6 G/DL (ref 12–15.9)
INHALED O2 CONCENTRATION: 28 %
MCH RBC QN AUTO: 31.1 PG (ref 26.6–33)
MCHC RBC AUTO-ENTMCNC: 30.3 G/DL (ref 31.5–35.7)
MCV RBC AUTO: 102.7 FL (ref 79–97)
MODALITY: ABNORMAL
O2 A-A PPRESDIFF RESPIRATORY: 0.4 MMHG
PCO2 BLDA: 35.8 MM HG (ref 35–45)
PH BLDA: 7.46 PH UNITS (ref 7.35–7.45)
PLATELET # BLD AUTO: 159 10*3/MM3 (ref 140–450)
PMV BLD AUTO: 9.6 FL (ref 6–12)
PO2 BLD: 222 MM[HG] (ref 0–500)
PO2 BLDA: 62.2 MM HG (ref 80–100)
POTASSIUM SERPL-SCNC: 3.3 MMOL/L (ref 3.5–5.2)
POTASSIUM SERPL-SCNC: 4.7 MMOL/L (ref 3.5–5.2)
PROCALCITONIN SERPL-MCNC: 0.04 NG/ML (ref 0–0.25)
QT INTERVAL: 316 MS
QTC INTERVAL: 372 MS
RBC # BLD AUTO: 3.73 10*6/MM3 (ref 3.77–5.28)
SAO2 % BLDCOA: 92.8 % (ref 92–98.5)
SET MECH RESP RATE: 18
SODIUM SERPL-SCNC: 141 MMOL/L (ref 136–145)
WBC NRBC COR # BLD AUTO: 22.34 10*3/MM3 (ref 3.4–10.8)

## 2024-09-28 PROCEDURE — 84132 ASSAY OF SERUM POTASSIUM: CPT | Performed by: INTERNAL MEDICINE

## 2024-09-28 PROCEDURE — 84145 PROCALCITONIN (PCT): CPT

## 2024-09-28 PROCEDURE — 94761 N-INVAS EAR/PLS OXIMETRY MLT: CPT

## 2024-09-28 PROCEDURE — 82948 REAGENT STRIP/BLOOD GLUCOSE: CPT

## 2024-09-28 PROCEDURE — 94664 DEMO&/EVAL PT USE INHALER: CPT

## 2024-09-28 PROCEDURE — 94799 UNLISTED PULMONARY SVC/PX: CPT

## 2024-09-28 PROCEDURE — 80048 BASIC METABOLIC PNL TOTAL CA: CPT

## 2024-09-28 PROCEDURE — 83605 ASSAY OF LACTIC ACID: CPT

## 2024-09-28 PROCEDURE — 94640 AIRWAY INHALATION TREATMENT: CPT

## 2024-09-28 PROCEDURE — 82803 BLOOD GASES ANY COMBINATION: CPT

## 2024-09-28 PROCEDURE — 25010000002 METHYLPREDNISOLONE PER 40 MG: Performed by: INTERNAL MEDICINE

## 2024-09-28 PROCEDURE — 87040 BLOOD CULTURE FOR BACTERIA: CPT

## 2024-09-28 PROCEDURE — 63710000001 INSULIN LISPRO (HUMAN) PER 5 UNITS

## 2024-09-28 PROCEDURE — 36600 WITHDRAWAL OF ARTERIAL BLOOD: CPT

## 2024-09-28 PROCEDURE — 85027 COMPLETE CBC AUTOMATED: CPT

## 2024-09-28 RX ORDER — ACETYLCYSTEINE 200 MG/ML
4 SOLUTION ORAL; RESPIRATORY (INHALATION)
Status: DISCONTINUED | OUTPATIENT
Start: 2024-09-28 | End: 2024-09-29

## 2024-09-28 RX ORDER — FUROSEMIDE 40 MG
20 TABLET ORAL AS NEEDED
Status: DISCONTINUED | OUTPATIENT
Start: 2024-09-28 | End: 2024-09-29 | Stop reason: HOSPADM

## 2024-09-28 RX ORDER — OXYBUTYNIN CHLORIDE 5 MG/1
5 TABLET, EXTENDED RELEASE ORAL DAILY
Status: DISCONTINUED | OUTPATIENT
Start: 2024-09-28 | End: 2024-09-29 | Stop reason: HOSPADM

## 2024-09-28 RX ORDER — ROSUVASTATIN CALCIUM 20 MG/1
20 TABLET, COATED ORAL NIGHTLY
Status: DISCONTINUED | OUTPATIENT
Start: 2024-09-28 | End: 2024-09-29 | Stop reason: HOSPADM

## 2024-09-28 RX ORDER — METOPROLOL TARTRATE 25 MG/1
12.5 TABLET, FILM COATED ORAL EVERY 12 HOURS SCHEDULED
Status: DISCONTINUED | OUTPATIENT
Start: 2024-09-28 | End: 2024-09-29 | Stop reason: HOSPADM

## 2024-09-28 RX ORDER — IPRATROPIUM BROMIDE AND ALBUTEROL SULFATE 2.5; .5 MG/3ML; MG/3ML
3 SOLUTION RESPIRATORY (INHALATION)
Status: DISCONTINUED | OUTPATIENT
Start: 2024-09-28 | End: 2024-09-29 | Stop reason: HOSPADM

## 2024-09-28 RX ORDER — METHYLPREDNISOLONE SODIUM SUCCINATE 40 MG/ML
40 INJECTION, POWDER, LYOPHILIZED, FOR SOLUTION INTRAMUSCULAR; INTRAVENOUS EVERY 8 HOURS
Status: DISCONTINUED | OUTPATIENT
Start: 2024-09-28 | End: 2024-09-29 | Stop reason: HOSPADM

## 2024-09-28 RX ORDER — MONTELUKAST SODIUM 10 MG/1
10 TABLET ORAL NIGHTLY
Status: DISCONTINUED | OUTPATIENT
Start: 2024-09-28 | End: 2024-09-29 | Stop reason: HOSPADM

## 2024-09-28 RX ORDER — POTASSIUM CHLORIDE 750 MG/1
40 TABLET, FILM COATED, EXTENDED RELEASE ORAL EVERY 4 HOURS
Status: COMPLETED | OUTPATIENT
Start: 2024-09-28 | End: 2024-09-28

## 2024-09-28 RX ADMIN — ROSUVASTATIN CALCIUM 20 MG: 20 TABLET, FILM COATED ORAL at 20:13

## 2024-09-28 RX ADMIN — METOPROLOL TARTRATE 12.5 MG: 25 TABLET, FILM COATED ORAL at 20:14

## 2024-09-28 RX ADMIN — OXYBUTYNIN CHLORIDE 5 MG: 5 TABLET, EXTENDED RELEASE ORAL at 14:16

## 2024-09-28 RX ADMIN — IPRATROPIUM BROMIDE AND ALBUTEROL SULFATE 3 ML: .5; 3 SOLUTION RESPIRATORY (INHALATION) at 01:33

## 2024-09-28 RX ADMIN — INSULIN LISPRO 3 UNITS: 100 INJECTION, SOLUTION INTRAVENOUS; SUBCUTANEOUS at 20:14

## 2024-09-28 RX ADMIN — METOPROLOL TARTRATE 12.5 MG: 25 TABLET, FILM COATED ORAL at 14:16

## 2024-09-28 RX ADMIN — IPRATROPIUM BROMIDE AND ALBUTEROL SULFATE 3 ML: .5; 3 SOLUTION RESPIRATORY (INHALATION) at 07:50

## 2024-09-28 RX ADMIN — METHYLPREDNISOLONE SODIUM SUCCINATE 40 MG: 40 INJECTION, POWDER, FOR SOLUTION INTRAMUSCULAR; INTRAVENOUS at 14:15

## 2024-09-28 RX ADMIN — IPRATROPIUM BROMIDE AND ALBUTEROL SULFATE 3 ML: .5; 3 SOLUTION RESPIRATORY (INHALATION) at 18:41

## 2024-09-28 RX ADMIN — ACETYLCYSTEINE 4 ML: 200 SOLUTION ORAL; RESPIRATORY (INHALATION) at 18:41

## 2024-09-28 RX ADMIN — Medication 10 ML: at 14:16

## 2024-09-28 RX ADMIN — POTASSIUM CHLORIDE 40 MEQ: 750 TABLET, EXTENDED RELEASE ORAL at 10:46

## 2024-09-28 RX ADMIN — POTASSIUM CHLORIDE 40 MEQ: 750 TABLET, EXTENDED RELEASE ORAL at 06:59

## 2024-09-28 RX ADMIN — MONTELUKAST SODIUM 10 MG: 10 TABLET, FILM COATED ORAL at 20:13

## 2024-09-28 RX ADMIN — METHYLPREDNISOLONE SODIUM SUCCINATE 40 MG: 40 INJECTION, POWDER, FOR SOLUTION INTRAMUSCULAR; INTRAVENOUS at 21:21

## 2024-09-28 RX ADMIN — INSULIN LISPRO 3 UNITS: 100 INJECTION, SOLUTION INTRAVENOUS; SUBCUTANEOUS at 12:31

## 2024-09-28 RX ADMIN — WARFARIN 4 MG: 4 TABLET ORAL at 12:54

## 2024-09-28 RX ADMIN — INSULIN LISPRO 3 UNITS: 100 INJECTION, SOLUTION INTRAVENOUS; SUBCUTANEOUS at 17:18

## 2024-09-28 NOTE — PLAN OF CARE
Problem: Adult Inpatient Plan of Care  Goal: Plan of Care Review  Outcome: Progressing   Goal Outcome Evaluation:         Vss.Up to chair throughout the day.Remains on o2 @ 2l n/c.Pulmonary consulted.K+ replaced per protocol.No c/o pain or n/v.

## 2024-09-28 NOTE — CONSULTS
[facility]  [phone]  Dr. Brendan Stauffer      Subjective   LOS: 0 days     Thank you for this consultation.  89-year-old female who presents with a troublesome cough that has been ongoing for the past 3 to 4 days and is much worse in the last 24 to 48 hours.  Patient states she has a lot of congestion and feels unable to cough it up.  She has a history of asthma with COPD.  She is a never smoker and is on bronchodilators through our office.  She also has sleep apnea and is on a BiPAP device at home.  She denies any fever chills or rigors.  She had a CT chest this admission which did not show infiltrates.  She also had a respiratory virus panel which was negative for a acute viral infection.  She states at home sometimes hypertonic saline will help her cough up the phlegm.  Has not done so thus far.    Caitlyn TUCKER Von  reports no history of alcohol use.,  reports that she has never smoked. She has never been exposed to tobacco smoke. She has never used smokeless tobacco.     Past Hx:  has a past medical history of Aortic calcification, Aortic regurgitation, Asthma, Atrial fibrillation, CAD (coronary artery disease), Carpal tunnel syndrome of left wrist, Chronic combined systolic and diastolic congestive heart failure, CKD (chronic kidney disease) stage 3, GFR 30-59 ml/min, COPD (chronic obstructive pulmonary disease), Coronary artery disease involving native coronary artery of native heart with angina pectoris, Disc degeneration, lumbar, Diverticulosis, DM type 2 (diabetes mellitus, type 2), GERD (gastroesophageal reflux disease), History of aneurysm, History of blood transfusion, History of fracture, History of heart attack, History of vitamin D deficiency, Hyperlipidemia, Hypertension, Leukemia, Mild mitral regurgitation, Mitral annular calcification, Osteopenia, PAF (paroxysmal atrial fibrillation), Peripheral neuropathy, Skin cancer, Sleep apnea, SSS (sick sinus syndrome), Stroke (cerebrum),  Informed Pepe Vargas of lab results and recommendations. Pepe Vargas verbalized thanks and understanding. Transferred to schedule appointment for lab. TIA (transient ischemic attack) (2017), and Tricuspid regurgitation.  Surg Hx:  has a past surgical history that includes Cardiac catheterization; Pacemaker Implantation; Cholecystectomy; Hernia repair; Hysterectomy; Replacement total knee (Bilateral); Coronary stent placement; Cardiac electrophysiology procedure (N/A, 2/7/2020); Bronchoscopy (Bilateral, 10/6/2020); Bronchoscopy (Bilateral, 10/9/2020); and Esophagogastroduodenoscopy (N/A, 9/14/2022).  FH: family history includes Colon cancer in her sister; Colon polyps in her mother; Diabetes in her niece; Heart disease in her brother, father, and mother; Hypertension in her brother, daughter, father, maternal aunt, maternal grandfather, maternal grandmother, mother, paternal grandfather, paternal grandmother, sister, and son; Stroke in her father.  SH:  reports that she has never smoked. She has never been exposed to tobacco smoke. She has never used smokeless tobacco. She reports that she does not drink alcohol and does not use drugs.    Medications Prior to Admission   Medication Sig Dispense Refill Last Dose    Benralizumab (FASENRA SC) Inject 1 dose under the skin into the appropriate area as directed Every 2 (Two) Months. Every 8 weeks       Calcium Carbonate-Vitamin D (calcium 500 mg vitamin D 5 mcg, 200 UT,) 500-5 MG-MCG tablet per tablet Take 1 tablet by mouth 2 (Two) Times a Day. 60 tablet 3     fluticasone (FLONASE) 50 MCG/ACT nasal spray Administer 1 spray into the nostril(s) as directed by provider Daily.       furosemide (LASIX) 20 MG tablet Take 1 tablet by mouth As Needed (For weight gain of greater than 2 pounds in 1 day or 5 pounds in 1 week). (Patient taking differently: Take 1 tablet by mouth Daily.) 30 tablet 11     glipizide (GLUCOTROL) 5 MG tablet Take 1 tablet by mouth Daily With Breakfast.       loperamide (IMODIUM) 2 MG capsule Take 1 capsule by mouth 4 (Four) Times a Day As Needed for Diarrhea.       melatonin 5 MG tablet tablet Take 1  tablet by mouth Every Night.       metFORMIN ER (GLUCOPHAGE-XR) 500 MG 24 hr tablet Take 1 tablet by mouth Daily With Breakfast.       montelukast (SINGULAIR) 10 MG tablet Take 1 tablet by mouth Every Night.       oxybutynin XL (DITROPAN-XL) 10 MG 24 hr tablet Take 1 tablet by mouth 2 (Two) Times a Day.       rosuvastatin (CRESTOR) 20 MG tablet Take 1 tablet by mouth Every Night.       warfarin (COUMADIN) 4 MG tablet TAKE ONE-HALF (1/2) TABLET ON SUNDAY AND FRIDAY AND TAKE ONE TABLET ALL OTHER DAYS OR AS DIRECTED (Patient taking differently: Take  by mouth See Admin Instructions. TAKE ONE-HALF (1/2) TABLET (2 MG) ON TUESDAYS AND THURSDAYS AND TAKE ONE TABLET (4 MG) ALL OTHER DAYS OR AS DIRECTED) 80 tablet 1     acetaminophen (TYLENOL) 325 MG tablet Take 2 tablets by mouth Every 4 (Four) Hours As Needed for Mild Pain .       Acetylcysteine (NAC PO) Take 1,000 mg by mouth 2 (Two) Times a Day.       albuterol (PROVENTIL) (2.5 MG/3ML) 0.083% nebulizer solution Take 2.5 mg by nebulization Every 4 (Four) Hours.       budesonide (PULMICORT) 0.5 MG/2ML nebulizer solution Take 2 mL by nebulization 2 (Two) Times a Day.       Cetirizine HCl 10 MG capsule Take 1 capsule by mouth Daily.       metoprolol tartrate (LOPRESSOR) 25 MG tablet Take 0.5 tablets by mouth Every 12 (Twelve) Hours for 30 days. 30 tablet 0      Allergies   Allergen Reactions    Accupril [Quinapril Hcl] Swelling, Other (See Comments), GI Intolerance and Delirium     HA, constipation     Ahist [Chlorpheniramine] Nausea Only, Other (See Comments) and Dizziness     Headache, Blurred vision    Clarithromycin Nausea Only, Other (See Comments) and Mental Status Change     HA, Depression, Flushing    Esomeprazole GI Intolerance    Latex Other (See Comments)     Skin breakdown    Levalbuterol Swelling    Levocetirizine Diarrhea and GI Intolerance    Lipitor [Atorvastatin] Other (See Comments) and Myalgia     Dark urine    Omeprazole Nausea Only and Other (See  Comments)     HA    Pravachol [Pravastatin] Nausea Only and GI Intolerance     Bloated, Constipation, HA    Sulindac Other (See Comments) and Myalgia     HA, joint pain, bruising    Valdecoxib Irritability    Chlorcyclizine Unknown - Low Severity    Diclofenac Sodium Unknown - Low Severity    Diphenhydramine Unknown - Low Severity    Lodine [Etodolac] Unknown - Low Severity    Sulfa Antibiotics Unknown - Low Severity       Review of Systems   Constitutional:  Negative for chills and fever.   HENT:  Positive for congestion. Negative for sore throat.    Respiratory:  Positive for cough and shortness of breath. Negative for wheezing.    Cardiovascular:  Negative for chest pain and leg swelling.   Gastrointestinal:  Negative for abdominal pain, nausea and vomiting.   Genitourinary:  Negative for dysuria and hematuria.   Musculoskeletal:  Negative for arthralgias and gait problem.   Skin:  Negative for pallor and rash.   Neurological:  Negative for seizures and headaches.   Psychiatric/Behavioral:  Negative for agitation and confusion.        Vital Signs past 24hrs  BP range: BP: (117-154)/(62-89) 118/66  Pulse range: Heart Rate:  [80-93] 80  Resp rate range: Resp:  [14-28] 22  Temp range: Temp (24hrs), Av °F (36.7 °C), Min:97.7 °F (36.5 °C), Max:98.6 °F (37 °C)    Oxygen range: SpO2:  [95 %-100 %] 95 %; Flow (L/min):  [2] 2;   Device (Oxygen Therapy): nasal cannula  85.8 kg (189 lb 2.5 oz); Body mass index is 33.51 kg/m².  Net IO Since Admission: 360 mL [24 1545]      Mechanical Ventilator:     Adult female who is sitting in a chair.  No acute distress.  On low-flow oxygen.  Pupils equal and react light.  Oropharynx moist.  JVP not elevated trachea midline thyroid not enlarged.  Lungs reveal bilateral air entry with diminished breath sounds were equal and no wheezing.  Absent breath sounds in the left lung base.  Patient has known elevated chronically left hemidiaphragm.  Percussion note resonant.  Chest  expansion equal no chest wall deformity or tenderness.  Heart examination S1-S2 present rhythm regular no murmurs.  No edema lower extremities.  Abdomen is soft nontender bowel sounds present no liver spleen enlargement.  No peripheral cyanosis clubbing.  Moves all 4 extremities sensorimotor intact.  No cervical, axillary, inguinal adenopathy.    Results Review:    I have reviewed the laboratory and imaging data from current admission. My annotations are as noted in assessment and plan.  Result Review:  I have personally reviewed the results from the time of this admission to 9/28/2024 15:45 EDT and agree with these findings:  [x]  Laboratory list / accordion  [x]  Microbiology  [x]  Radiology  []  EKG/Telemetry   []  Cardiology/Vascular   []  Pathology  []  Old records  []  Other:      Medication Review:  I have reviewed the current MAR. My annotations are as noted in assessment and plan.       Lines, Drains & Airways       Active LDAs       Name Placement date Placement time Site Days    Peripheral IV 09/27/24 1838 Anterior;Left Forearm 09/27/24  1838  Forearm  less than 1    External Urinary Catheter 08/26/24  2134  --  32    External Urinary Catheter 09/28/24  0055  --  less than 1                  Isolation status: No active isolations    Dietary Orders (From admission, onward)       Start     Ordered    09/27/24 2326  Diet: Cardiac, Diabetic; Healthy Heart (2-3 Na+); Consistent Carbohydrate; Fluid Consistency: Thin (IDDSI 0)  Diet Effective Now        References:    Diet Order [facility]   Question Answer Comment   Diets: Cardiac    Diets: Diabetic    Cardiac Diet: Healthy Heart (2-3 Na+)    Diabetic Diet: Consistent Carbohydrate    Fluid Consistency: Thin (IDDSI 0)        09/27/24 2330                    Isolation:  No active isolations    PCCM Problems  Acute bronchitis with mucous plugging  Asthma with COPD  CHARLENE on CPAP  Chronically elevated left hemidiaphragm  CLL with acquired hypogammaglobulinemia  A-fib  on anticoagulation  Type 2 diabetes mellitus  Obesity        Plan of Treatment    Patient symptoms sound like mucous plugging with acute bronchitis.  Will adjust bronchodilator treatments and add mucolytic's.  If no improvement she may need bronchoscopy.  I will ask my partner Dr. Coburn to see her tomorrow as he has her regular pulmonologist.    CLL with acquired hypogammaglobulinemia and on monthly IVIG repeat spent.  Note elevated white count but so far no evidence of acute bacterial infection in the lungs.  Pro-Uriel was normal which is reassuring.    Asthma with COPD but without evident exacerbation to me.  If no wheezing tomorrow either then can stop IV steroids.    Patient should use home CPAP and order was placed for her to to do so.        Electronically signed by Brendan Stauffer MD, 09/28/24, 3:45 PM EDT.      Part of this note may be an electronic transcription/translation of spoken language to printed text using the Dragon Dictation System.

## 2024-09-28 NOTE — PROGRESS NOTES
Problem: The patient is allergic to levalbuterol. The respiratory medication that is ordered is duoneb which have ipratropium and albuterol mixed together. Post treatment of giving duoneb, patient had excessive cough.     Solution: The best decision is to order Brovana and Pulmicort BID as the respiratory medications. Brovana treats COPD and Pulmicort will decrease the swelling and irriation in the airways.

## 2024-09-28 NOTE — PLAN OF CARE
Goal Outcome Evaluation:  Plan of Care Reviewed With: patient        Progress: no change   Patient stated she wants to feel better. She was carried along with her plan of care. On oxygen at 3L NC. Patient safety was ensured all through the shift. Awaiting physical therapy to come work with her per her request.

## 2024-09-28 NOTE — ED NOTES
Nursing report ED to floor  Caitlyn Longoria  89 y.o.  female    HPI :  HPI (Adult)  Stated Reason for Visit: cough and soa    Chief Complaint  Chief Complaint   Patient presents with    Shortness of Breath    Cough       Admitting doctor:   Oscar Ibanez MD    Admitting diagnosis:   The primary encounter diagnosis was COPD with acute exacerbation. Diagnoses of Leukocytosis, unspecified type and Hypernatremia were also pertinent to this visit.    Code status:   Current Code Status       Date Active Code Status Order ID Comments User Context       Prior            Allergies:   Accupril [quinapril hcl], Ahist [chlorpheniramine], Clarithromycin, Esomeprazole, Latex, Levalbuterol, Levocetirizine, Lipitor [atorvastatin], Omeprazole, Pravachol [pravastatin], Sulindac, Valdecoxib, Chlorcyclizine, Diclofenac sodium, Diphenhydramine, Lodine [etodolac], and Sulfa antibiotics    Isolation:   No active isolations    Intake and Output  No intake or output data in the 24 hours ending 09/27/24 2326    Weight:       09/27/24  1803   Weight: 81.6 kg (180 lb)       Most recent vitals:   Vitals:    09/27/24 2031 09/27/24 2100 09/27/24 2158 09/27/24 2300   BP: 134/77 137/78 132/81 147/78   BP Location:       Patient Position:       Pulse: 80 80 82 80   Resp:       Temp:       TempSrc:       SpO2: 98% 98% 98% 97%   Weight:       Height:           Active LDAs/IV Access:   Lines, Drains & Airways       Active LDAs       Name Placement date Placement time Site Days    Peripheral IV 09/27/24 1838 Anterior;Left Forearm 09/27/24 1838  Forearm  less than 1    External Urinary Catheter 08/26/24  2134  --  32                    Labs (abnormal labs have a star):   Labs Reviewed   COMPREHENSIVE METABOLIC PANEL - Abnormal; Notable for the following components:       Result Value    Sodium 146 (*)     Chloride 109 (*)     AST (SGOT) 36 (*)     Alkaline Phosphatase 143 (*)     All other components within normal limits    Narrative:     GFR Normal  >60  Chronic Kidney Disease <60  Kidney Failure <15    The GFR formula is only valid for adults with stable renal function between ages 18 and 70.   SINGLE HS TROPONIN T - Abnormal; Notable for the following components:    HS Troponin T 41 (*)     All other components within normal limits    Narrative:     High Sensitive Troponin T Reference Range:  <14.0 ng/L- Negative Female for AMI  <22.0 ng/L- Negative Male for AMI  >=14 - Abnormal Female indicating possible myocardial injury.  >=22 - Abnormal Male indicating possible myocardial injury.   Clinicians would have to utilize clinical acumen, EKG, Troponin, and serial changes to determine if it is an Acute Myocardial Infarction or myocardial injury due to an underlying chronic condition.        PROTIME-INR - Abnormal; Notable for the following components:    Protime 20.3 (*)     INR 1.70 (*)     All other components within normal limits   CBC WITH AUTO DIFFERENTIAL - Abnormal; Notable for the following components:    WBC 27.72 (*)     .8 (*)     All other components within normal limits   MANUAL DIFFERENTIAL - Abnormal; Notable for the following components:    Neutrophil % 23.0 (*)     Lymphocyte % 74.0 (*)     Myelocyte % 1.0 (*)     Lymphocytes Absolute 20.79 (*)     Basophils Absolute 0.28 (*)     All other components within normal limits   RESPIRATORY PANEL PCR W/ COVID-19 (SARS-COV-2), NP SWAB IN UTM/VTP, 2 HR TAT - Normal    Narrative:     In the setting of a positive respiratory panel with a viral infection PLUS a negative procalcitonin without other underlying concern for bacterial infection, consider observing off antibiotics or discontinuation of antibiotics and continue supportive care. If the respiratory panel is positive for atypical bacterial infection (Bordetella pertussis, Chlamydophila pneumoniae, or Mycoplasma pneumoniae), consider antibiotic de-escalation to target atypical bacterial infection.   BNP (IN-HOUSE) - Normal    Narrative:      This assay is used as an aid in the diagnosis of individuals suspected of having heart failure. It can be used as an aid in the diagnosis of acute decompensated heart failure (ADHF) in patients presenting with signs and symptoms of ADHF to the emergency department (ED). In addition, NT-proBNP of <300 pg/mL indicates ADHF is not likely.    Age Range Result Interpretation  NT-proBNP Concentration (pg/mL:      <50             Positive            >450                   Gray                 300-450                    Negative             <300    50-75           Positive            >900                  Gray                300-900                  Negative            <300      >75             Positive            >1800                  Gray                300-1800                  Negative            <300   RAINBOW DRAW    Narrative:     The following orders were created for panel order [place] Draw.  Procedure                               Abnormality         Status                     ---------                               -----------         ------                     Green Top (Gel)[[id number]]                                  Final result               Lavender Top[[id number]]                                     Final result               Gold Top - SST[[id number]]                                   Final result               Light Blue Top[[id number]]                                   Final result                 Please view results for these tests on the individual orders.   POCT PROTIME - INR   CBC AND DIFFERENTIAL    Narrative:     The following orders were created for panel order CBC & Differential.  Procedure                               Abnormality         Status                     ---------                               -----------         ------                     CBC Auto Differential[[id number]]        Abnormal            Final result                 Please view results for these tests on the individual  orders.   GREEN TOP   LAVENDER TOP   GOLD TOP - SST   LIGHT BLUE TOP       EKG:   ECG 12 Lead ED Triage Standing Order; SOA   Preliminary Result   HEART RATE=83  bpm   RR Hqrchbxv=465  ms   WI Interval=  ms   P Horizontal Axis=201  deg   P Front Axis=  deg   QRSD Ihemegsx=920  ms   QT Qdzppooa=937  ms   YKpW=891  ms   QRS Axis=-25  deg   T Wave Axis=-15  deg   - ABNORMAL ECG -   Afib/flut and V-paced complexes   No further analysis attempted due to paced rhythm   Artifact in lead(s) II,aVR,aVF,V1,V2   Date and Time of Study:2024-09-27 18:08:09          Meds given in ED:   Medications   sodium chloride 0.9 % flush 10 mL (has no administration in time range)   cephalexin (KEFLEX) capsule 500 mg (has no administration in time range)   warfarin (COUMADIN) tablet 4 mg (has no administration in time range)   methylPREDNISolone sodium succinate (SOLU-Medrol) injection 125 mg (has no administration in time range)   ipratropium-albuterol (DUO-NEB) nebulizer solution 3 mL (has no administration in time range)       Imaging results:  CT Chest Without Contrast Diagnostic    Result Date: 9/27/2024   1. No definite acute infiltrates are seen. Background changes of COPD, with chronic parenchymal scarring noted. 2. Multiple low-attenuation lesions noted throughout the spleen. These are indeterminate. They are new or increased in size when compared to prior exam. Neoplastic involvement is not excluded. Correlation with history is recommended. Radiation dose reduction techniques were utilized, including automated exposure control and exposure modulation based on body size.   This report was finalized on 9/27/2024 10:11 PM by Dr. Heidi Garza M.D on Workstation: [id number]      XR Chest 2 View    Result Date: 9/27/2024  Stable left hemidiaphragm elevation. No focal consolidation. No pleural effusion or pneumothorax. Cardiac silhouette remains at the upper limits of normal with dual-chamber pacemaker. No focal osseous  abnormality.  This report was finalized on 9/27/2024 5:42 PM by Dr. Sergey Crump M.D on Workstation: [id number]       Ambulatory status:   - assist x 1 w/ walker     Social issues:   Social History     Socioeconomic History    Marital status: Single   Tobacco Use    Smoking status: Never     Passive exposure: Never    Smokeless tobacco: Never    Tobacco comments:     caffeine use- soda   Vaping Use    Vaping status: Never Used   Substance and Sexual Activity    Alcohol use: Never    Drug use: No    Sexual activity: Defer       Peripheral Neurovascular  Peripheral Neurovascular (Adult)  Peripheral Neurovascular WDL: WDL    Neuro Cognitive  Neuro Cognitive (Adult)  Cognitive/Neuro/Behavioral WDL: WDL    Learning  Learning Assessment (Adult)  Learning Readiness and Ability: no barriers identified  Education Provided  Person Taught: patient    Respiratory  Respiratory WDL  Respiratory WDL: .WDL except, all, cough  Rhythm/Pattern, Respiratory: shortness of breath, labored  Cough Type: productive    Abdominal Pain       Pain Assessments  Pain (Adult)  (0-10) Pain Rating: Rest: 4  (0-10) Pain Rating: Activity: 4    NIH Stroke Scale       Erin Jacob RN  09/27/24 23:26 EDT

## 2024-09-29 ENCOUNTER — READMISSION MANAGEMENT (OUTPATIENT)
Dept: CALL CENTER | Facility: HOSPITAL | Age: 89
End: 2024-09-29
Payer: MEDICARE

## 2024-09-29 VITALS
TEMPERATURE: 97.9 F | RESPIRATION RATE: 18 BRPM | OXYGEN SATURATION: 95 % | DIASTOLIC BLOOD PRESSURE: 66 MMHG | SYSTOLIC BLOOD PRESSURE: 105 MMHG | HEIGHT: 63 IN | BODY MASS INDEX: 33.79 KG/M2 | HEART RATE: 81 BPM | WEIGHT: 190.7 LBS

## 2024-09-29 LAB
ALBUMIN SERPL-MCNC: 3.5 G/DL (ref 3.5–5.2)
ALBUMIN/GLOB SERPL: 1.5 G/DL
ALP SERPL-CCNC: 123 U/L (ref 39–117)
ALT SERPL W P-5'-P-CCNC: 21 U/L (ref 1–33)
ANION GAP SERPL CALCULATED.3IONS-SCNC: 8 MMOL/L (ref 5–15)
AST SERPL-CCNC: 19 U/L (ref 1–32)
BILIRUB SERPL-MCNC: 0.4 MG/DL (ref 0–1.2)
BUN SERPL-MCNC: 21 MG/DL (ref 8–23)
BUN/CREAT SERPL: 30 (ref 7–25)
CALCIUM SPEC-SCNC: 8.5 MG/DL (ref 8.6–10.5)
CHLORIDE SERPL-SCNC: 105 MMOL/L (ref 98–107)
CO2 SERPL-SCNC: 25 MMOL/L (ref 22–29)
CREAT SERPL-MCNC: 0.7 MG/DL (ref 0.57–1)
DEPRECATED RDW RBC AUTO: 49.6 FL (ref 37–54)
EGFRCR SERPLBLD CKD-EPI 2021: 82.8 ML/MIN/1.73
ERYTHROCYTE [DISTWIDTH] IN BLOOD BY AUTOMATED COUNT: 13.2 % (ref 12.3–15.4)
GLOBULIN UR ELPH-MCNC: 2.3 GM/DL
GLUCOSE BLDC GLUCOMTR-MCNC: 188 MG/DL (ref 70–130)
GLUCOSE BLDC GLUCOMTR-MCNC: 249 MG/DL (ref 70–130)
GLUCOSE SERPL-MCNC: 214 MG/DL (ref 65–99)
HCT VFR BLD AUTO: 36.1 % (ref 34–46.6)
HGB BLD-MCNC: 11.1 G/DL (ref 12–15.9)
LYMPHOCYTES # BLD MANUAL: 15.11 10*3/MM3 (ref 0.7–3.1)
MCH RBC QN AUTO: 31.4 PG (ref 26.6–33)
MCHC RBC AUTO-ENTMCNC: 30.7 G/DL (ref 31.5–35.7)
MCV RBC AUTO: 102 FL (ref 79–97)
NEUTROPHILS # BLD AUTO: 11.87 10*3/MM3 (ref 1.7–7)
NEUTROPHILS NFR BLD MANUAL: 44 % (ref 42.7–76)
PLAT MORPH BLD: NORMAL
PLATELET # BLD AUTO: 173 10*3/MM3 (ref 140–450)
PMV BLD AUTO: 9.8 FL (ref 6–12)
POTASSIUM SERPL-SCNC: 4.5 MMOL/L (ref 3.5–5.2)
PROT SERPL-MCNC: 5.8 G/DL (ref 6–8.5)
RBC # BLD AUTO: 3.54 10*6/MM3 (ref 3.77–5.28)
RBC MORPH BLD: NORMAL
SODIUM SERPL-SCNC: 138 MMOL/L (ref 136–145)
VARIANT LYMPHS NFR BLD MANUAL: 56 % (ref 19.6–45.3)
WBC MORPH BLD: NORMAL
WBC NRBC COR # BLD AUTO: 26.98 10*3/MM3 (ref 3.4–10.8)

## 2024-09-29 PROCEDURE — 94799 UNLISTED PULMONARY SVC/PX: CPT

## 2024-09-29 PROCEDURE — 85025 COMPLETE CBC W/AUTO DIFF WBC: CPT | Performed by: INTERNAL MEDICINE

## 2024-09-29 PROCEDURE — 94664 DEMO&/EVAL PT USE INHALER: CPT

## 2024-09-29 PROCEDURE — 63710000001 INSULIN LISPRO (HUMAN) PER 5 UNITS

## 2024-09-29 PROCEDURE — 94761 N-INVAS EAR/PLS OXIMETRY MLT: CPT

## 2024-09-29 PROCEDURE — 25010000002 METHYLPREDNISOLONE PER 40 MG: Performed by: INTERNAL MEDICINE

## 2024-09-29 PROCEDURE — 80053 COMPREHEN METABOLIC PANEL: CPT | Performed by: INTERNAL MEDICINE

## 2024-09-29 PROCEDURE — 82948 REAGENT STRIP/BLOOD GLUCOSE: CPT

## 2024-09-29 PROCEDURE — 94760 N-INVAS EAR/PLS OXIMETRY 1: CPT

## 2024-09-29 PROCEDURE — 85007 BL SMEAR W/DIFF WBC COUNT: CPT | Performed by: INTERNAL MEDICINE

## 2024-09-29 RX ORDER — ARFORMOTEROL TARTRATE 15 UG/2ML
15 SOLUTION RESPIRATORY (INHALATION)
Status: DISCONTINUED | OUTPATIENT
Start: 2024-09-29 | End: 2024-09-29 | Stop reason: HOSPADM

## 2024-09-29 RX ORDER — BUDESONIDE 0.5 MG/2ML
0.5 INHALANT ORAL
Status: DISCONTINUED | OUTPATIENT
Start: 2024-09-29 | End: 2024-09-29 | Stop reason: HOSPADM

## 2024-09-29 RX ORDER — PREDNISONE 20 MG/1
TABLET ORAL
Qty: 11 TABLET | Refills: 0 | Status: SHIPPED | OUTPATIENT
Start: 2024-09-29 | End: 2024-10-08

## 2024-09-29 RX ORDER — ALBUTEROL SULFATE 0.83 MG/ML
2.5 SOLUTION RESPIRATORY (INHALATION) EVERY 6 HOURS PRN
Status: DISCONTINUED | OUTPATIENT
Start: 2024-09-29 | End: 2024-09-29 | Stop reason: HOSPADM

## 2024-09-29 RX ORDER — PREDNISONE 20 MG/1
TABLET ORAL
Qty: 12 TABLET | Refills: 0 | Status: SHIPPED | OUTPATIENT
Start: 2024-09-29 | End: 2024-09-29

## 2024-09-29 RX ADMIN — ACETYLCYSTEINE 4 ML: 200 SOLUTION ORAL; RESPIRATORY (INHALATION) at 07:02

## 2024-09-29 RX ADMIN — METOPROLOL TARTRATE 12.5 MG: 25 TABLET, FILM COATED ORAL at 08:16

## 2024-09-29 RX ADMIN — IPRATROPIUM BROMIDE AND ALBUTEROL SULFATE 3 ML: .5; 3 SOLUTION RESPIRATORY (INHALATION) at 06:58

## 2024-09-29 RX ADMIN — INSULIN LISPRO 3 UNITS: 100 INJECTION, SOLUTION INTRAVENOUS; SUBCUTANEOUS at 12:34

## 2024-09-29 RX ADMIN — METHYLPREDNISOLONE SODIUM SUCCINATE 40 MG: 40 INJECTION, POWDER, FOR SOLUTION INTRAMUSCULAR; INTRAVENOUS at 05:50

## 2024-09-29 RX ADMIN — IPRATROPIUM BROMIDE AND ALBUTEROL SULFATE 3 ML: .5; 3 SOLUTION RESPIRATORY (INHALATION) at 15:02

## 2024-09-29 RX ADMIN — IPRATROPIUM BROMIDE AND ALBUTEROL SULFATE 3 ML: .5; 3 SOLUTION RESPIRATORY (INHALATION) at 11:11

## 2024-09-29 RX ADMIN — BUDESONIDE 0.5 MG: 0.5 INHALANT RESPIRATORY (INHALATION) at 11:17

## 2024-09-29 RX ADMIN — OXYBUTYNIN CHLORIDE 5 MG: 5 TABLET, EXTENDED RELEASE ORAL at 08:16

## 2024-09-29 RX ADMIN — METHYLPREDNISOLONE SODIUM SUCCINATE 40 MG: 40 INJECTION, POWDER, FOR SOLUTION INTRAMUSCULAR; INTRAVENOUS at 12:34

## 2024-09-29 RX ADMIN — INSULIN LISPRO 2 UNITS: 100 INJECTION, SOLUTION INTRAVENOUS; SUBCUTANEOUS at 08:16

## 2024-09-29 RX ADMIN — ARFORMOTEROL TARTRATE 15 MCG: 15 SOLUTION RESPIRATORY (INHALATION) at 11:18

## 2024-09-29 NOTE — DISCHARGE SUMMARY
Date of Admission: 9/27/2024  Date of Discharge:  9/29/2024  Primary Care Physician: Zenaida Suarez MD     Discharge Diagnosis:  Active Hospital Problems    Diagnosis  POA    **COPD exacerbation [J44.1]  Yes    Leukocytosis [D72.829]  Yes    Acute exacerbation of COPD with asthma [J44.1, J45.901]  Yes    Sick sinus syndrome [I49.5]  Yes    Anemia [D64.9]  Yes    Hypoxia [R09.02]  Yes    Chronic combined systolic (congestive) and diastolic (congestive) heart failure [I50.42]  Yes    HTN (hypertension) [I10]  Yes    CKD (chronic kidney disease), stage III [N18.30]  Yes    Type 2 diabetes mellitus [E11.9]  Yes    Paroxysmal atrial fibrillation [I48.0]  Yes    HLD (hyperlipidemia) [E78.5]  Yes    Coronary artery disease [I25.10]  Yes      Resolved Hospital Problems   No resolved problems to display.       Presenting Problem/History of Present Illness from H&P:  Hypernatremia [E87.0]  COPD with acute exacerbation [J44.1]  Acute exacerbation of COPD with asthma [J44.1, J45.901]  Leukocytosis, unspecified type [D72.829]     This is a 89-year-old female, with past medical history significant for diabetes, coronary disease, paroxysmal atrial fibrillation, asthma, sleep apnea, presents with acute cough and congestion associated with dyspnea and progressively getting worse over the last 3 days. Patient upon further evaluation was found to be hypoxemic, she was started on supplemental oxygen. Patient has also been receiving Keflex on outpatient basis. Patient denies any relief at this point of time, she was given nebulizers and steroids in the emergency room. She will benefit from pulmonary evaluation.     Hospital Course:  The patient is a 89 y.o. female who presented with acute bronchitis, mucous plugging, COPD exacerbation.  She was admitted and pulmonology consulted.  She has improved with steroid and breathing treatments.  Her mucous plugging has improved as well.  She is going to discharge with a steroid  taper and follow-up with pulmonology in 2 weeks.  Discussed with Dr. Coburn.    She is on Coumadin for A-fib and has a paced rhythm.    She has CLL with acquired hypogammaglobulinemia.  Continue outpatient therapy and monitoring    Diabetes with previous A1c of 6.8    Exam Today:  General AA NAD  HEENT NCAT anicteric sclera  CV RRR  Lung somewhat diminished air movement on the left but overall was moving air well.  No rales.  Abdomen ND NT  Extremity no cyanosis, BLE edema present  Neuro CN II through XII grossly intact  Psych normal mood and affect    Results:  CXR  Stable left hemidiaphragm elevation. No focal consolidation.  No pleural effusion or pneumothorax. Cardiac silhouette remains at the  upper limits of normal with dual-chamber pacemaker. No focal osseous  abnormality.    CT Chest  1. No definite acute infiltrates are seen. Background changes of COPD,  with chronic parenchymal scarring noted.  2. Multiple low-attenuation lesions noted throughout the spleen. These  are indeterminate. They are new or increased in size when compared to  prior exam. Neoplastic involvement is not excluded. Correlation with  history is recommended.  Radiation dose reduction techniques were utilized, including automated  exposure control and exposure modulation based on body size.    Procedures Performed:         Consults:   Consults       Date and Time Order Name Status Description    9/28/2024 12:16 PM Inpatient Pulmonology Consult Completed     9/27/2024 10:39 PM [facility] (on-call MD unless specified) Details               Discharge Disposition:  Home or Self Care    Discharge Medications:     Discharge Medications        New Medications        Instructions Start Date   predniSONE 20 MG tablet  Commonly known as: DELTASONE   Take p.o. daily: 40 mg x 3 days, then 20 mg x 3 days, then 10 mg x 3 days             Changes to Medications        Instructions Start Date   warfarin 4 MG tablet  Commonly known as: COUMADIN  What changed: See  the new instructions.   TAKE ONE-HALF (1/2) TABLET ON SUNDAY AND FRIDAY AND TAKE ONE TABLET ALL OTHER DAYS OR AS DIRECTED             Continue These Medications        Instructions Start Date   acetaminophen 325 MG tablet  Commonly known as: TYLENOL   650 mg, Oral, Every 4 Hours PRN      albuterol (2.5 MG/3ML) 0.083% nebulizer solution  Commonly known as: PROVENTIL   2.5 mg, Nebulization, Every 4 Hours      budesonide 0.5 MG/2ML nebulizer solution  Commonly known as: PULMICORT   0.5 mg, Nebulization, 2 Times Daily      calcium 500 mg vitamin D 5 mcg (200 UT) 500-5 MG-MCG tablet per tablet   1 tablet, Oral, 2 Times Daily      Cetirizine HCl 10 MG capsule   1 capsule, Oral, Daily      FASENRA SC   1 dose, Subcutaneous, Every 2 Months, Every 8 weeks      fluticasone 50 MCG/ACT nasal spray  Commonly known as: FLONASE   1 spray, Nasal, Daily      furosemide 20 MG tablet  Commonly known as: LASIX   20 mg, Oral, As Needed      glipizide 5 MG tablet  Commonly known as: GLUCOTROL   5 mg, Oral, Daily With Breakfast      loperamide 2 MG capsule  Commonly known as: IMODIUM   2 mg, Oral, 4 Times Daily PRN      melatonin 5 MG tablet tablet   5 mg, Oral, Nightly      metFORMIN  MG 24 hr tablet  Commonly known as: GLUCOPHAGE-XR   500 mg, Oral, Daily With Breakfast      metoprolol tartrate 25 MG tablet  Commonly known as: LOPRESSOR   12.5 mg, Oral, Every 12 Hours Scheduled      montelukast 10 MG tablet  Commonly known as: SINGULAIR   10 mg, Oral, Nightly      NAC PO   1,000 mg, Oral, 2 Times Daily      oxybutynin XL 10 MG 24 hr tablet  Commonly known as: DITROPAN-XL   10 mg, Oral, 2 Times Daily      rosuvastatin 20 MG tablet  Commonly known as: CRESTOR   20 mg, Oral, Nightly               Discharge Diet:   Diet Instructions       Diet: Cardiac Diets, Diabetic Diets; Healthy Heart (2-3 Na+); Thin (IDDSI 0); Consistent Carbohydrate      Discharge Diet:  Cardiac Diets  Diabetic Diets       Cardiac Diet: Healthy Heart (2-3 Na+)     Fluid Consistency: Thin (IDDSI 0)    Diabetic Diet: Consistent Carbohydrate            Activity at Discharge:   Activity Instructions       Activity as Tolerated              Follow-up Appointments:   Follow-up Information       Juno Coburn MD Follow up in 2 week(s).    Specialties: Pulmonary Disease, Sleep Medicine, Intensive Care  Contact information:  [address]  [place] [id number] [phone]               Zenaida Suarez MD Follow up.    Specialty: Internal Medicine  Contact information:  [address]  [place] [id number] [phone]                             Test Results Pending at Discharge:  Pending Labs       Order Current Status    Blood Culture - Blood, Arm, Left Preliminary result    Blood Culture - Blood, Arm, Right Preliminary result             Obinna Cm MD  09/29/24  11:45 EDT    Time Spent on Discharge Activities: >30 minutes    Dictated portions using Dragon dictation software.

## 2024-09-29 NOTE — PLAN OF CARE
Goal Outcome Evaluation:  Plan of Care Reviewed With: patient        Progress: improving   Patient stated she feels better than when she arrived the hospital, vital signs stable. She had a calm rest all through the shift on oxygen at 2L NC. Vital sign stable. No complain made this moment.

## 2024-09-29 NOTE — NURSING NOTE
Spoke with pharmacy, unable to give flu vaccines until October.Pt had pna vaccine within last 5 years.

## 2024-09-29 NOTE — PLAN OF CARE
Goal Outcome Evaluation:              Outcome Evaluation: Patient discharged home.

## 2024-09-29 NOTE — PROGRESS NOTES
"  PROGRESS NOTE  Patient Name: Caitlyn Longoria  Age/Sex: 89 y.o. female  : 1934  MRN: [id number]    Date of Admission: 2024  Date of Encounter Visit: 24   LOS: 1 day   Patient Care Team:  Zenaida Suarez MD as PCP - General (Internal Medicine)  Pao Levi CHILO as Pharmacist  Alexander Valiente PharmD as Pharmacist (Pharmacy)  Zenaida Suarez MD as Referring Physician (Internal Medicine)  Lucien Larkin MD as Consulting Physician (Hematology and Oncology)    Chief Complaint: COPD, thick mucus CLL    Hospital course: Patient is doing better, she was clear to auscultation this morning, her cough does not sound wet or congested anymore.  She feels back to her baseline, she is cleared for discharge from my standpoint.  She is already on the steroid        REVIEW OF SYSTEMS:   CONSTITUTIONAL: no fever or chills  CARDIOVASCULAR: No chest pain, chest pressure or chest discomfort. No palpitations or edema.   RESPIRATORY: Less shortness of breath, less cough, able to expectorate with not much secretions.   GASTROINTESTINAL: No anorexia, nausea, vomiting or diarrhea. No abdominal pain or blood.   HEMATOLOGIC: No bleeding or bruising.     Ventilator/Non-Invasive Ventilation Settings (From admission, onward)      None              Vital Signs  Temp:  [97.9 °F (36.6 °C)-98.1 °F (36.7 °C)] 97.9 °F (36.6 °C)  Heart Rate:  [80-83] 82  Resp:  [16-22] 16  BP: (118-123)/(66-75) 121/74  SpO2:  [93 %-99 %] 93 %  on  Flow (L/min):  [2] 2 Device (Oxygen Therapy): room air    Intake/Output Summary (Last 24 hours) at 2024 1111  Last data filed at 2024 0948  Gross per 24 hour   Intake 100 ml   Output 601 ml   Net -501 ml     Flowsheet Rows      Flowsheet Row First Filed Value   Admission Height 160 cm (63\") Documented at 2024 1803   Admission Weight 81.6 kg (180 lb) Documented at 2024 1803          Body mass index is 33.78 kg/m².      24  0100 24  0558 " 09/29/24  0511   Weight: 82 kg (180 lb 12.4 oz) 85.8 kg (189 lb 2.5 oz) 86.5 kg (190 lb 11.2 oz)       Physical Exam:  GEN:  No acute distress, alert, cooperative, well developed chronically sick looking  EYES:   Sclerae clear. No icterus. PERRL. Normal EOM  ENT:   External ears/nose normal, no oral lesions, no thrush, mucous membranes moist  NECK:  Supple, midline trachea, no JVD  LUNGS: Normal chest on inspection, diminished on the left, no obvious rhonchi except upon coughing, but no sign of any wet secretions or coarse rhonchi.  No crackles. Respirations regular, even and unlabored.   CV:  Regular rhythm and rate. Normal S1/S2. No murmurs, gallops, or rubs noted.  ABD:  Soft, nontender and nondistended. Normal bowel sounds. No guarding  EXT:  Moves all extremities well. No cyanosis. No redness.  Positive edema  Skin: Dry, intact, no bleeding    Results Review:    Results From Last 14 Days   Lab Units 09/28/24  0109   LACTATE mmol/L 1.1     Results from last 7 days   Lab Units 09/29/24  0353 09/28/24  1434 09/28/24  0423 09/27/24  1808   SODIUM mmol/L 138  --  141 146*   POTASSIUM mmol/L 4.5 4.7 3.3* 3.6   CHLORIDE mmol/L 105  --  107 109*   CO2 mmol/L 25.0  --  21.8* 26.1   BUN mg/dL 21  --  14 16   CREATININE mg/dL 0.70  --  0.54* 0.73   CALCIUM mg/dL 8.5*  --  8.4* 9.0   AST (SGOT) U/L 19  --   --  36*   ALT (SGPT) U/L 21  --   --  26   ANION GAP mmol/L 8.0  --  12.2 10.9   ALBUMIN g/dL 3.5  --   --  3.9     Results from last 7 days   Lab Units 09/27/24  1808   HSTROP T ng/L 41*         Results from last 7 days   Lab Units 09/27/24  1808   PROBNP pg/mL 789.0     Results from last 7 days   Lab Units 09/29/24  0353 09/28/24  0423 09/27/24  1808   WBC 10*3/mm3 26.98* 22.34* 27.72*   HEMOGLOBIN g/dL 11.1* 11.6* 12.5   HEMATOCRIT % 36.1 38.3 39.3   PLATELETS 10*3/mm3 173 159 192   MCV fL 102.0* 102.7* 100.8*     Results from last 7 days   Lab Units 09/27/24  1808 09/27/24  0000 09/25/24  0000   INR  1.70* 1.80 2.00  "              Invalid input(s): \"LDLCALC\"  Results from last 7 days   Lab Units 09/28/24  0221   PH, ARTERIAL pH units 7.457*   PCO2, ARTERIAL mm Hg 35.8   PO2 ART mm Hg 62.2*   HCO3 ART mmol/L 25.2         Glucose   Date/Time Value Ref Range Status   09/29/2024 0730 188 (H) 70 - 130 mg/dL Final   09/28/2024 2002 235 (H) 70 - 130 mg/dL Final   09/28/2024 1702 237 (H) 70 - 130 mg/dL Final   09/28/2024 1214 241 (H) 70 - 130 mg/dL Final   09/28/2024 0810 145 (H) 70 - 130 mg/dL Final     Results from last 7 days   Lab Units 09/28/24  0109   PROCALCITONIN ng/mL 0.04   LACTATE mmol/L 1.1     Results from last 7 days   Lab Units 09/28/24  0423 09/28/24  0109   BLOODCX  No growth at 24 hours No growth at 24 hours         Results from last 7 days   Lab Units 09/27/24  1839   COVID19  Not Detected   ADENOVIRUS DETECTION BY PCR  Not Detected   CORONAVIRUS 229E  Not Detected   CORONAVIRUS HKU1  Not Detected   CORONAVIRUS NL63  Not Detected   CORONAVIRUS OC43  Not Detected   HUMAN METAPNEUMOVIRUS  Not Detected   HUMAN RHINOVIRUS/ENTEROVIRUS  Not Detected   INFLUENZA B PCR  Not Detected   PARAINFLUENZA 1  Not Detected   PARAINFLUENZA VIRUS 2  Not Detected   PARAINFLUENZA VIRUS 3  Not Detected   PARAINFLUENZA VIRUS 4  Not Detected   BORDETELLA PERTUSSIS PCR  Not Detected   BORDETELLA PARAPERTUSSIS PCR  Not Detected   CHLAMYDOPHILA PNEUMONIAE PCR  Not Detected   MYCOPLAMA PNEUMO PCR  Not Detected   RSV, PCR  Not Detected               Imaging:   Imaging Results (All)       Procedure Component Value Units Date/Time    CT Chest Without Contrast Diagnostic [[id number]] Collected: 09/27/24 2200     Updated: 09/27/24 2214    Narrative:      CT CHEST WITHOUT CONTRAST     HISTORY: Shortness of breath     COMPARISON: September 29, 2020     TECHNIQUE: Axial CT imaging was obtained through the thorax. No IV  contrast was administered.     FINDINGS:  Patient is noted to have areas of parenchymal scarring within both  lungs. Appearance is " similar to the prior exam. There are background  changes of COPD. There is compression deformity which is noted at T5 and  T11. T5 compression deformity was present on prior exam, although height  loss has worsened. Deformity of the inferior endplate of T11 is new when  compared to prior exam, but still may be chronic. MRI would be more  sensitive for characterization. There is stable elevation of the left  hemidiaphragm. There is probably a nodule within the right lobe of the  thyroid gland, measuring approximately 7 mm. Trachea and esophagus are  within normal limits. There are coronary artery calcifications.  Left-sided pacemaker is noted. There is enlargement of the main  pulmonary artery, which can be seen in the setting of pulmonary arterial  hypertension. Thoracic aorta is normal in caliber. There are prominent  mediastinal lymph nodes. For example, a precarinal node measures up to  1.6 cm. However, this appears stable compared to prior exam. Many of the  nodes are calcified. Images through the upper abdomen demonstrate  multiple low-attenuation lesions within the spleen. For example, one  measures up to 1.6 cm. This 1 was actually present on the prior CT from  September 2020, but appears larger. Previously, it measured 1.2 cm. The  remaining lesions were not clearly visible. Renal cysts noted on the  right kidney. Pancreas is mildly atrophic. Gallbladder is absent.  Prominent portacaval node is stable.       Impression:         1. No definite acute infiltrates are seen. Background changes of COPD,  with chronic parenchymal scarring noted.  2. Multiple low-attenuation lesions noted throughout the spleen. These  are indeterminate. They are new or increased in size when compared to  prior exam. Neoplastic involvement is not excluded. Correlation with  history is recommended.  Radiation dose reduction techniques were utilized, including automated  exposure control and exposure modulation based on body size.         This report was finalized on 9/27/2024 10:11 PM by Dr. Heidi Garza M.D on Workstation: [id number]               I reviewed the patient's new clinical results.  I personally viewed and interpreted the patient's imaging results:        Medication Review:   acetylcysteine, 4 mL, Nebulization, 4x Daily - RT  arformoterol, 15 mcg, Nebulization, BID - RT  budesonide, 0.5 mg, Nebulization, BID - RT  insulin lispro, 2-7 Units, Subcutaneous, 4x Daily AC & at Bedtime  ipratropium-albuterol, 3 mL, Nebulization, 4x Daily - RT  methylPREDNISolone sodium succinate, 40 mg, Intravenous, Q8H  metoprolol tartrate, 12.5 mg, Oral, Q12H  montelukast, 10 mg, Oral, Nightly  oxybutynin XL, 5 mg, Oral, Daily  rosuvastatin, 20 mg, Oral, Nightly             ASSESSMENT:   Acute bronchitis with no more evidence of any mucous plugging  Asthma with COPD  CHARLENE on CPAP  Chronically elevated left hemidiaphragm  CLL with acquired hypogammaglobulinemia  A-fib on anticoagulation  Type 2 diabetes mellitus  Obesity    PLAN:  Patient is suffering clear on exam with no retained secretion  Patient is afebrile  Her x-ray looks similar to her baseline and her CAT scan did not show any retained mucous plugs  She already have her nebulizer medicine at home and all the measures necessary to manage thick secretions  No antibiotic indicated, she has chronic leukocytosis because of the CLL  She is cleared to be discharged home on prednisone taper  She already have a follow-up scheduled with me in 2 weeks which she was encouraged to keep  Discussed with patient and primary team  Labs/Notes/films were independently reviewed and pertinent results are summarized above  The copied texts in this note were reviewed and they are accurate as of 09/29/24    Disposition: Home today    Juno Coburn MD  09/29/24  11:11 EDT             Dictated utilizing Dragon dictation

## 2024-09-30 ENCOUNTER — ANTICOAGULATION VISIT (OUTPATIENT)
Dept: PHARMACY | Facility: HOSPITAL | Age: 89
End: 2024-09-30
Payer: MEDICARE

## 2024-09-30 ENCOUNTER — OFFICE VISIT (OUTPATIENT)
Dept: WOUND CARE | Facility: HOSPITAL | Age: 89
End: 2024-09-30
Payer: MEDICARE

## 2024-09-30 DIAGNOSIS — I48.0 PAROXYSMAL ATRIAL FIBRILLATION: Primary | ICD-10-CM

## 2024-09-30 NOTE — PROGRESS NOTES
Anticoagulation Clinic Progress Note    Anticoagulation Summary  As of 9/30/2024      INR goal:  2.0-3.0   TTR:  65.6% (5.7 y)   INR used for dosing:  No new INR was available at the time of this encounter.   Warfarin maintenance plan:  2 mg every Tue, Thu; 4 mg all other days   Weekly warfarin total:  24 mg   No change documented:  Jaylin Nick, Jessica   Plan last modified:  Petra Oliveira RPH (8/14/2024)   Next INR check:  10/2/2024   Priority:  Maintenance   Target end date:  Indefinite    Indications    Paroxysmal atrial fibrillation [I48.0]                 Anticoagulation Episode Summary       INR check location:      Preferred lab:      Send INR reminders to:  [facility]  POOL    Comments:  [facility] lab beginning 4/22/20          Anticoagulation Care Providers       Provider Role Specialty Phone number    Jonah Regalado Jr., MD Referring Cardiology [phone]            Clinic Interview:  Patient Findings     Positives:  Hospital admission    Negatives:  Signs/symptoms of thrombosis, Signs/symptoms of bleeding,   Laboratory test error suspected, Change in health, Change in alcohol use,   Change in activity, Upcoming invasive procedure, Emergency department   visit, Upcoming dental procedure, Missed doses, Extra doses, Change in   medications, Change in diet/appetite, Bruising, Other complaints    Comments:  Patient admitted 9/27 to 9/29 for COPD exacerbation and   discharged on prednisone taper.  She reports she took 2 mg instead of   usual 4 mg (per discharge instructions) last night.  She confirms [facility] will be visiting on Wednesday to do INR.       Clinical Outcomes     Negatives:  Major bleeding event, Thromboembolic event,   Anticoagulation-related hospital admission, Anticoagulation-related ED   visit, Anticoagulation-related fatality    Comments:  Patient admitted 9/27 to 9/29 for COPD exacerbation and   discharged on prednisone taper.  She reports she took 2 mg  instead of   usual 4 mg (per discharge instructions) last night.  She confirms [facility] will be visiting on Wednesday to do INR.         INR History:      Latest Ref Rng & Units 9/23/2024     8:37 AM 9/25/2024    12:00 AM 9/25/2024     1:56 PM 9/27/2024    12:00 AM 9/27/2024     3:34 PM 9/27/2024     6:08 PM 9/30/2024    11:22 AM   Anticoagulation Monitoring   INR  4.30  2.00  1.80     INR Date  9/23/2024 9/25/2024 9/27/2024     INR Goal  2.0-3.0  2.0-3.0  2.0-3.0  2.0-3.0   Trend  Same  Same  Same  Same   Last Week Total  24 mg  20 mg  20 mg  18 mg   Next Week Total  20 mg  24 mg  24 mg  24 mg   Sun  -  -  4 mg  -   Mon  Hold (9/23)  -  4 mg  4 mg   Tue  2 mg  -  2 mg  2 mg   Wed  -  4 mg  -  -   Thu  -  2 mg  -  -   Fri  -  -  4 mg  -   Sat  -  -  4 mg  -   Historical INR 0.90 - 1.10  2.00      1.80      1.70         This result is from an external source.       Plan:  1. INR is  NO INR  today- see above in Anticoagulation Summary.   Will instruct Caitlyn Longoria to Continue their warfarin regimen- see above in Anticoagulation Summary.  2. Follow up in 2 days   3. They have been instructed to call if any changes in medications, doses, concerns, etc. Patient expresses understanding and has no further questions at this time.    Jaylin Nick, PharmD

## 2024-10-01 ENCOUNTER — READMISSION MANAGEMENT (OUTPATIENT)
Dept: CALL CENTER | Facility: HOSPITAL | Age: 89
End: 2024-10-01
Payer: MEDICARE

## 2024-10-01 NOTE — OUTREACH NOTE
COPD/PN Week 1 Survey      Flowsheet Row Responses   [facility] patient discharged from? [facility]   Does the patient have one of the following disease processes/diagnoses(primary or secondary)? COPD   Week 1 attempt successful? Yes   Call start time 1458   Call end time 1459   Discharge diagnosis COPD exacerbation   Is patient permission given to speak with other caregiver? Yes   List who call center can speak with Trae mendoza   Person spoke with today (if not patient) and relationship Trae mendoza   What is the patient's perception of their health status since discharge? Improving   Week 1 call completed? Yes   Wrap up additional comments Very brief call-pt's brother states pt is doing well   Call end time 1459            Duyen H - Registered Nurse

## 2024-10-01 NOTE — CASE MANAGEMENT/SOCIAL WORK
Case Management Discharge Note      Final Note: Home         Selected Continued Care - Discharged on 9/29/2024 Admission date: 9/27/2024 - Discharge disposition: Home or Self Care      Destination    No services have been selected for the patient.                Durable Medical Equipment    No services have been selected for the patient.                Dialysis/Infusion    No services have been selected for the patient.                Home Medical Care    No services have been selected for the patient.                Therapy    No services have been selected for the patient.                Community Resources    No services have been selected for the patient.                Community & DME    No services have been selected for the patient.                    Selected Continued Care - Prior Encounters Includes continued care and service providers with selected services from prior encounters from 6/29/2024 to 9/29/2024      Discharged on 9/3/2024 Admission date: 8/26/2024 - Discharge disposition: Skilled Nursing Facility (DC - External)      Destination       Service Provider Selected Services Address Phone Fax Patient Preferred    [facility] [address] [place] [address], [facility] [address] [phone] --                          Transportation Services  Private: Car    Final Discharge Disposition Code: 01 - home or self-care

## 2024-10-02 LAB — INR PPP: 1.9

## 2024-10-03 ENCOUNTER — LAB (OUTPATIENT)
Dept: LAB | Facility: HOSPITAL | Age: 89
End: 2024-10-03
Payer: MEDICARE

## 2024-10-03 ENCOUNTER — INFUSION (OUTPATIENT)
Dept: ONCOLOGY | Facility: HOSPITAL | Age: 89
End: 2024-10-03
Payer: MEDICARE

## 2024-10-03 ENCOUNTER — ANTICOAGULATION VISIT (OUTPATIENT)
Dept: PHARMACY | Facility: HOSPITAL | Age: 89
End: 2024-10-03
Payer: MEDICARE

## 2024-10-03 ENCOUNTER — OFFICE VISIT (OUTPATIENT)
Dept: ONCOLOGY | Facility: CLINIC | Age: 89
End: 2024-10-03
Payer: MEDICARE

## 2024-10-03 VITALS — BODY MASS INDEX: 33.79 KG/M2 | HEIGHT: 63 IN | OXYGEN SATURATION: 93 % | TEMPERATURE: 98.3 F | HEART RATE: 84 BPM

## 2024-10-03 DIAGNOSIS — Z79.899 HIGH RISK MEDICATION USE: ICD-10-CM

## 2024-10-03 DIAGNOSIS — C91.10 CLL (CHRONIC LYMPHOCYTIC LEUKEMIA): Primary | ICD-10-CM

## 2024-10-03 DIAGNOSIS — I48.0 PAROXYSMAL ATRIAL FIBRILLATION: Primary | ICD-10-CM

## 2024-10-03 DIAGNOSIS — C91.12 CLL (CHRONIC LYMPHOID LEUKEMIA) IN RELAPSE: Primary | ICD-10-CM

## 2024-10-03 DIAGNOSIS — D80.1 HYPOGAMMAGLOBULINEMIA: ICD-10-CM

## 2024-10-03 DIAGNOSIS — C91.10 CLL (CHRONIC LYMPHOCYTIC LEUKEMIA): ICD-10-CM

## 2024-10-03 LAB
ALBUMIN SERPL-MCNC: 4 G/DL (ref 3.5–5.2)
ALBUMIN/GLOB SERPL: 1.9 G/DL
ALP SERPL-CCNC: 116 U/L (ref 39–117)
ALT SERPL W P-5'-P-CCNC: 71 U/L (ref 1–33)
ANION GAP SERPL CALCULATED.3IONS-SCNC: 11.2 MMOL/L (ref 5–15)
AST SERPL-CCNC: 41 U/L (ref 1–32)
BACTERIA SPEC AEROBE CULT: NORMAL
BACTERIA SPEC AEROBE CULT: NORMAL
BASOPHILS # BLD AUTO: 0.01 10*3/MM3 (ref 0–0.2)
BASOPHILS NFR BLD AUTO: 0 % (ref 0–1.5)
BILIRUB SERPL-MCNC: 0.7 MG/DL (ref 0–1.2)
BUN SERPL-MCNC: 22 MG/DL (ref 8–23)
BUN/CREAT SERPL: 27.2 (ref 7–25)
CALCIUM SPEC-SCNC: 8.9 MG/DL (ref 8.6–10.5)
CHLORIDE SERPL-SCNC: 106 MMOL/L (ref 98–107)
CO2 SERPL-SCNC: 26.8 MMOL/L (ref 22–29)
CREAT SERPL-MCNC: 0.81 MG/DL (ref 0.57–1)
DEPRECATED RDW RBC AUTO: 56.5 FL (ref 37–54)
EGFRCR SERPLBLD CKD-EPI 2021: 69.5 ML/MIN/1.73
EOSINOPHIL # BLD AUTO: 0 10*3/MM3 (ref 0–0.4)
EOSINOPHIL NFR BLD AUTO: 0 % (ref 0.3–6.2)
ERYTHROCYTE [DISTWIDTH] IN BLOOD BY AUTOMATED COUNT: 15.4 % (ref 12.3–15.4)
GLOBULIN UR ELPH-MCNC: 2.1 GM/DL
GLUCOSE SERPL-MCNC: 121 MG/DL (ref 65–99)
HCT VFR BLD AUTO: 38 % (ref 34–46.6)
HGB BLD-MCNC: 12.3 G/DL (ref 12–15.9)
IGA1 MFR SER: <50 MG/DL (ref 70–400)
IGG1 SER-MCNC: 837 MG/DL (ref 700–1600)
IGM SERPL-MCNC: 32 MG/DL (ref 40–230)
IMM GRANULOCYTES # BLD AUTO: 0.11 10*3/MM3 (ref 0–0.05)
IMM GRANULOCYTES NFR BLD AUTO: 0.2 % (ref 0–0.5)
LYMPHOCYTES # BLD AUTO: 42.4 10*3/MM3 (ref 0.7–3.1)
LYMPHOCYTES NFR BLD AUTO: 87.7 % (ref 19.6–45.3)
MCH RBC QN AUTO: 32.4 PG (ref 26.6–33)
MCHC RBC AUTO-ENTMCNC: 32.4 G/DL (ref 31.5–35.7)
MCV RBC AUTO: 100 FL (ref 79–97)
MONOCYTES # BLD AUTO: 0.19 10*3/MM3 (ref 0.1–0.9)
MONOCYTES NFR BLD AUTO: 0.4 % (ref 5–12)
NEUTROPHILS NFR BLD AUTO: 11.7 % (ref 42.7–76)
NEUTROPHILS NFR BLD AUTO: 5.63 10*3/MM3 (ref 1.7–7)
NRBC BLD AUTO-RTO: 0 /100 WBC (ref 0–0.2)
PLATELET # BLD AUTO: 171 10*3/MM3 (ref 140–450)
PMV BLD AUTO: 10.4 FL (ref 6–12)
POTASSIUM SERPL-SCNC: 3.8 MMOL/L (ref 3.5–5.2)
PROT SERPL-MCNC: 6.1 G/DL (ref 6–8.5)
RBC # BLD AUTO: 3.8 10*6/MM3 (ref 3.77–5.28)
SODIUM SERPL-SCNC: 144 MMOL/L (ref 136–145)
WBC NRBC COR # BLD AUTO: 48.34 10*3/MM3 (ref 3.4–10.8)

## 2024-10-03 PROCEDURE — A9270 NON-COVERED ITEM OR SERVICE: HCPCS | Performed by: INTERNAL MEDICINE

## 2024-10-03 PROCEDURE — 80053 COMPREHEN METABOLIC PANEL: CPT

## 2024-10-03 PROCEDURE — 63710000001 HYDROXYZINE PAMOATE PER 25 MG: Performed by: INTERNAL MEDICINE

## 2024-10-03 PROCEDURE — 25010000002 IMMUNE GLOBULIN (HUMAN) 30 GM/300ML SOLUTION: Performed by: INTERNAL MEDICINE

## 2024-10-03 PROCEDURE — 96365 THER/PROPH/DIAG IV INF INIT: CPT

## 2024-10-03 PROCEDURE — 85025 COMPLETE CBC W/AUTO DIFF WBC: CPT

## 2024-10-03 PROCEDURE — 82784 ASSAY IGA/IGD/IGG/IGM EACH: CPT | Performed by: INTERNAL MEDICINE

## 2024-10-03 PROCEDURE — 36415 COLL VENOUS BLD VENIPUNCTURE: CPT

## 2024-10-03 PROCEDURE — 96366 THER/PROPH/DIAG IV INF ADDON: CPT

## 2024-10-03 RX ORDER — HYDROXYZINE PAMOATE 25 MG/1
25 CAPSULE ORAL ONCE
Status: CANCELLED | OUTPATIENT
Start: 2024-10-03

## 2024-10-03 RX ORDER — HYDROXYZINE PAMOATE 25 MG/1
25 CAPSULE ORAL ONCE
Status: COMPLETED | OUTPATIENT
Start: 2024-10-03 | End: 2024-10-03

## 2024-10-03 RX ORDER — ACETAMINOPHEN 325 MG/1
650 TABLET ORAL ONCE
Status: CANCELLED | OUTPATIENT
Start: 2024-10-03

## 2024-10-03 RX ORDER — FAMOTIDINE 10 MG/ML
20 INJECTION, SOLUTION INTRAVENOUS AS NEEDED
Status: CANCELLED | OUTPATIENT
Start: 2024-10-03

## 2024-10-03 RX ORDER — DIPHENHYDRAMINE HYDROCHLORIDE 50 MG/ML
50 INJECTION INTRAMUSCULAR; INTRAVENOUS AS NEEDED
Status: CANCELLED | OUTPATIENT
Start: 2024-10-03

## 2024-10-03 RX ORDER — MEPERIDINE HYDROCHLORIDE 50 MG/ML
25 INJECTION INTRAMUSCULAR; INTRAVENOUS; SUBCUTANEOUS
Status: CANCELLED | OUTPATIENT
Start: 2024-10-03 | End: 2024-10-04

## 2024-10-03 RX ORDER — ACETAMINOPHEN 325 MG/1
650 TABLET ORAL ONCE
Status: DISCONTINUED | OUTPATIENT
Start: 2024-10-03 | End: 2024-10-03 | Stop reason: HOSPADM

## 2024-10-03 RX ORDER — SODIUM CHLORIDE 9 MG/ML
250 INJECTION, SOLUTION INTRAVENOUS ONCE
Status: CANCELLED | OUTPATIENT
Start: 2024-10-03

## 2024-10-03 RX ADMIN — IMMUNE GLOBULIN INFUSION (HUMAN) 30 G: 100 INJECTION, SOLUTION INTRAVENOUS; SUBCUTANEOUS at 10:58

## 2024-10-03 RX ADMIN — HYDROXYZINE PAMOATE 25 MG: 25 CAPSULE ORAL at 10:33

## 2024-10-03 NOTE — PROGRESS NOTES
Anticoagulation Clinic Progress Note    Anticoagulation Summary  As of 10/3/2024      INR goal:  2.0-3.0   TTR:  65.5% (5.7 y)   INR used for dosin.90 (10/2/2024)   Warfarin maintenance plan:  2 mg every Tue, Thu; 4 mg all other days   Weekly warfarin total:  24 mg   No change documented:  Alexander Valiente, PharmD   Plan last modified:  Petra Oliveira RPH (2024)   Next INR check:  10/4/2024   Priority:  Maintenance   Target end date:  Indefinite    Indications    Paroxysmal atrial fibrillation [I48.0]                 Anticoagulation Episode Summary       INR check location:      Preferred lab:      Send INR reminders to:  [facility]  POOL    Comments:  [facility] Home lab beginning 20          Anticoagulation Care Providers       Provider Role Specialty Phone number    Jonah Regalado Jr., MD Referring Cardiology [phone]          Findings:  Patient Findings     Positives:  Missed doses, Change in medications, Hospital admission    Negatives:  Signs/symptoms of thrombosis, Signs/symptoms of bleeding,   Laboratory test error suspected, Change in health, Change in alcohol use,   Change in activity, Upcoming invasive procedure, Emergency department   visit, Upcoming dental procedure, Extra doses, Change in diet/appetite,   Bruising, Other complaints    Comments:  S/w Ms. Longoria and Esequiel Galvin reports Ms. Longoria missed   her warfarin dose on 24 (day she was most recently admitted to   hospital for COPD exacerbation. Ms. Longoria reports her pulmonologist   advised her to increase her prednisone dose from 20 mg to 40 mg daily   beginning 10/3/24.       Clinical Outcomes     Negatives:  Major bleeding event, Thromboembolic event,   Anticoagulation-related hospital admission, Anticoagulation-related ED   visit, Anticoagulation-related fatality    Comments:  S/w Ms. Longoria and Glenis/JE Acostay reports Ms. Longoria missed   her warfarin dose on 24 (day she was most recently admitted  to   hospital for COPD exacerbation. Ms. Longoria reports her pulmonologist   advised her to increase her prednisone dose from 20 mg to 40 mg daily   beginning 10/3/24.      INR History:      9/25/2024     1:56 PM 9/27/2024    12:00 AM 9/27/2024     3:34 PM 9/27/2024     6:08 PM 9/30/2024    11:22 AM 10/2/2024    12:00 AM 10/3/2024     7:58 AM   Anticoagulation Monitoring   INR 2.00  1.80    1.90   INR Date 9/25/2024  9/27/2024    10/2/2024   INR Goal 2.0-3.0  2.0-3.0  2.0-3.0  2.0-3.0   Trend Same  Same  Same  Same   Last Week Total 20 mg  20 mg  18 mg  22 mg   Next Week Total 24 mg  24 mg  24 mg  24 mg   Sun -  4 mg  -  -   Mon -  4 mg  4 mg  -   Tue -  2 mg  2 mg  -   Wed 4 mg  -  -  -   Thu 2 mg  -  -  2 mg   Fri -  4 mg  -  -   Sat -  4 mg  -  -   Historical INR  1.80      1.70   1.90        Visit Report     Report         This result is from an external source.       Plan:  1. INR was Subtherapeutic yesterday- see above in Anticoagulation Summary.   Provided instructions to Ms. Longoria and Glenis with [facility] to Continue their warfarin regimen at dose of 2 mg Tues/Thurs, 4 mg all other days - see above in Anticoagulation Summary.  2. Follow up in 1 day      Alexander Valiente, JoseluisD

## 2024-10-03 NOTE — PROGRESS NOTES
Subjective .     REASONS FOR FOLLOW UP:  1. chronic lymphocytic leukemia with recurrent lung infections  2.  Hypogammaglobulinemia    Referring MD:    Amarilis Suarez MD    History of Present Illness patient is an.age female with 2014 with stage 0 CLL which has never required treatments.    In 2019 she  had multiple hospitalizations for lung infections predominantly viral probably also bacterial and at her last hospitalization in October we rechecked her gammaglobulins which were low and opted to start IV IgG monthly to see if this would keep her out of the hospital. She has continued on monthly IVIG since from October through March .  The patient has not had any recurrent infections/hospitalizations since initiating IVIG.    As she is reviewed back today, she was recently hospitalized for COPD exacerbation and started on high-dose steroids which is made her muscles weak and having a hard time getting up from her chair     She is hoping that resuming her IV IgG for the fall will help with the infections   we reviewed her CBC showing upward trend in her WBC and absolute lymphocyte count. Denies any B symptoms. No change in appetite or weight loss, no night sweats or fever.  Denies any new palpable adenopathy she is feeling about the same, noting that she does not sleep well but this is a longstanding chronic issue.    Denies other concerns at this time.    She does want to continue treatment as long as her quality of life is good    Past Medical History:   Diagnosis Date    Aortic calcification     mild, on echo 12/17/2017    Aortic regurgitation     Trace    Asthma     Atrial fibrillation     CAD (coronary artery disease)     Carpal tunnel syndrome of left wrist     Chronic combined systolic and diastolic congestive heart failure     CKD (chronic kidney disease) stage 3, GFR 30-59 ml/min     COPD (chronic obstructive pulmonary disease)     Coronary artery disease involving native coronary artery of native heart with  angina pectoris     Disc degeneration, lumbar     Diverticulosis     DM type 2 (diabetes mellitus, type 2)     GERD (gastroesophageal reflux disease)     History of aneurysm     right femoral artery s/p LHC    History of blood transfusion     History of fracture     History of heart attack     History of vitamin D deficiency     Hyperlipidemia     Hypertension     Leukemia     Mild mitral regurgitation     Mitral annular calcification     12/8/2017- echo, moderate    Osteopenia     PAF (paroxysmal atrial fibrillation)     Peripheral neuropathy     Skin cancer     Left hand    Sleep apnea     bipap    SSS (sick sinus syndrome)     Stroke (cerebrum)     TIA (transient ischemic attack) 2017    Tricuspid regurgitation     Trace       ONCOLOGIC HISTORY:    [facility] HISTORY  Patient is an 85-year-old female whom I had seen in 2014 when she was hospitalized at [facility] and was diagnosed with chronic lymphocytic leukemia.  She has not been to see me in over 4 years and is following with Dr. Suarez her primary care doctor and her white count is gone up a little higher than usual to 22,000 and she is referred back to us for evaluation.  We are doing a telephone visit because of the coronavirus pandemic and her age and susceptibility.    When I originally saw her in 2014 she was brought into the ER unresponsive in septic shock on 03/25/2014 with methicillin-resistant staphylococcus identified in her blood cultures. The patient has been on antibiotic with improvement, but had some residual kidney damage with a creatinine in the 4-range requiring hemodialysis, and was noted on admission to have an elevated white count with lymphocytosis, normal hemoglobin, but a platelet count of 95,000, and over the course of the illness her platelet count normalized and the white count had gone up into the 20,000 range with lymphocytosis. A flow cytometry was sent which is consistent with CLL, she came to our office once or twice and then stopped  coming because she was so stable     She tells me now she was hospitalized recently with rhinovirus infection and has had diagnosed with asthma and is having a lot of respiratory issues but does not require oxygen.She is at the [facility] home in independent living and managing fairly well    She denies fever sweats or weight loss.  Her last white count was on 5/26/2020  Showed a white count of 18,000 with 66 lymphs and 27 neutrophils platelet white hemoglobin is 12.6 platelet 188,000    I reassured Christopher that no treatment is indicated for this level of leukocytosis from CLL and if she has no systemic complaints I do not feel strongly about doing CAT scans at her age but I would like to physically examine her in the office in a couple of months to make sure there is no obvious adenopathy or hepatosplenomegaly.    She does have recurrent infections and we can check quantitative immunoglobulins to see how low they are as another adjunctive option to prevent recurrent infections if she has hypogammaglobulinemia    She remains on Coumadin for atrial fibrillation      Current Outpatient Medications on File Prior to Visit   Medication Sig Dispense Refill    acetaminophen (TYLENOL) 325 MG tablet Take 2 tablets by mouth Every 4 (Four) Hours As Needed for Mild Pain .      Acetylcysteine (NAC PO) Take 1,000 mg by mouth 2 (Two) Times a Day.      albuterol (PROVENTIL) (2.5 MG/3ML) 0.083% nebulizer solution Take 2.5 mg by nebulization Every 4 (Four) Hours.      Benralizumab (FASENRA SC) Inject 1 dose under the skin into the appropriate area as directed Every 2 (Two) Months. Every 8 weeks      budesonide (PULMICORT) 0.5 MG/2ML nebulizer solution Take 2 mL by nebulization 2 (Two) Times a Day.      Calcium Carbonate-Vitamin D (calcium 500 mg vitamin D 5 mcg, 200 UT,) 500-5 MG-MCG tablet per tablet Take 1 tablet by mouth 2 (Two) Times a Day. 60 tablet 3    Cetirizine HCl 10 MG capsule Take 1 capsule by mouth Daily.      fluticasone  (FLONASE) 50 MCG/ACT nasal spray Administer 1 spray into the nostril(s) as directed by provider Daily.      furosemide (LASIX) 20 MG tablet Take 1 tablet by mouth As Needed (For weight gain of greater than 2 pounds in 1 day or 5 pounds in 1 week). (Patient taking differently: Take 1 tablet by mouth Daily.) 30 tablet 11    glipizide (GLUCOTROL) 5 MG tablet Take 1 tablet by mouth Daily With Breakfast.      loperamide (IMODIUM) 2 MG capsule Take 1 capsule by mouth 4 (Four) Times a Day As Needed for Diarrhea.      melatonin 5 MG tablet tablet Take 1 tablet by mouth Every Night.      metFORMIN ER (GLUCOPHAGE-XR) 500 MG 24 hr tablet Take 1 tablet by mouth Daily With Breakfast.      metoprolol tartrate (LOPRESSOR) 25 MG tablet Take 0.5 tablets by mouth Every 12 (Twelve) Hours for 30 days. 30 tablet 0    montelukast (SINGULAIR) 10 MG tablet Take 1 tablet by mouth Every Night.      oxybutynin XL (DITROPAN-XL) 10 MG 24 hr tablet Take 1 tablet by mouth 2 (Two) Times a Day.      predniSONE (DELTASONE) 20 MG tablet Take 2 tablets by mouth Daily for 3 days, THEN 1 tablet Daily for 3 days, THEN 0.5 tablets Daily for 3 days. 11 tablet 0    rosuvastatin (CRESTOR) 20 MG tablet Take 1 tablet by mouth Every Night.      warfarin (COUMADIN) 4 MG tablet TAKE ONE-HALF (1/2) TABLET ON SUNDAY AND FRIDAY AND TAKE ONE TABLET ALL OTHER DAYS OR AS DIRECTED (Patient taking differently: Take  by mouth See Admin Instructions. TAKE ONE-HALF (1/2) TABLET (2 MG) ON TUESDAYS AND THURSDAYS AND TAKE ONE TABLET (4 MG) ALL OTHER DAYS OR AS DIRECTED) 80 tablet 1     No current facility-administered medications on file prior to visit.       ALLERGIES:     Allergies   Allergen Reactions    Accupril [Quinapril Hcl] Swelling, Other (See Comments), GI Intolerance and Delirium     HA, constipation     Ahist [Chlorpheniramine] Nausea Only, Other (See Comments) and Dizziness     Headache, Blurred vision    Clarithromycin Nausea Only, Other (See Comments) and Mental  Status Change     HA, Depression, Flushing    Esomeprazole GI Intolerance    Latex Other (See Comments)     Skin breakdown    Levalbuterol Swelling    Levocetirizine Diarrhea and GI Intolerance    Lipitor [Atorvastatin] Other (See Comments) and Myalgia     Dark urine    Omeprazole Nausea Only and Other (See Comments)     HA    Pravachol [Pravastatin] Nausea Only and GI Intolerance     Bloated, Constipation, HA    Sulindac Other (See Comments) and Myalgia     HA, joint pain, bruising    Valdecoxib Irritability    Chlorcyclizine Unknown - Low Severity    Diclofenac Sodium Unknown - Low Severity    Diphenhydramine Unknown - Low Severity    Lodine [Etodolac] Unknown - Low Severity    Sulfa Antibiotics Unknown - Low Severity       Social History     Socioeconomic History    Marital status: Single   Tobacco Use    Smoking status: Never     Passive exposure: Never    Smokeless tobacco: Never    Tobacco comments:     caffeine use- soda   Vaping Use    Vaping status: Never Used   Substance and Sexual Activity    Alcohol use: Never    Drug use: No    Sexual activity: Defer         Cancer-related family history includes Colon cancer in her sister.     I have reviewed the patient's medical history in detail and updated the computerized patient record.    Review of Systems  I have reviewed and confirmed the accuracy of the ROS as documented by the MA/LPN/RN Lucien Larkin MD      Objective      Vitals:    10/03/24 0945   Pulse: 84   Temp: 98.3 °F (36.8 °C)   SpO2: 93%             10/3/2024     9:50 AM   Current Status   ECOG score 0     Pain Score    10/03/24 0945   PainSc: 0-No pain           CONSTITUTIONAL:  Vital signs reviewed.  No distress, looks comfortable.  EYES:  Conjunctiva and lids unremarkable.  PERRLA  EARS,NOSE,MOUTH,THROAT: Hearing intact.  Lips, teeth, gums appear unremarkable.  RESPIRATORY:  Normal respiratory effort.  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  No murmurs rubs or gallops.  1+ lower extremity edema.  With the wound which is bandaged on the left  GASTROINTESTINAL: Abdomen unremarkable.  Nontender. No hepatosplenomegaly.   LYMPHATIC:  No cervical, supraclavicular, inguinal lymphadenopathy.  SKIN:  Warm.  No rashes.  Numerous ecchymosis on her arms and legs  PSYCHIATRIC:  Normal judgment and insight.  Normal mood and affect.     I have reexamined the patient 10/03/2024 and the results are consistent with the previously documented exam except as updated. Lucien Larkin MD       RECENT LABS:  Results from last 7 days   Lab Units 10/03/24  0939 09/29/24  0353 09/28/24  0423 09/27/24  1808   WBC 10*3/mm3 48.34* 26.98* 22.34* 27.72*   NEUTROS ABS 10*3/mm3 5.63 11.87*  --  6.38   LYMPHS ABS 10*3/mm3  --  15.11*  --  20.79*   HEMOGLOBIN g/dL 12.3 11.1* 11.6* 12.5   HEMATOCRIT % 38.0 36.1 38.3 39.3   PLATELETS 10*3/mm3 171 173 159 192       Results from last 7 days   Lab Units 09/29/24  0353 09/28/24  1434 09/28/24  0423 09/27/24  1808   SODIUM mmol/L 138  --  141 146*   POTASSIUM mmol/L 4.5 4.7 3.3* 3.6   CHLORIDE mmol/L 105  --  107 109*   CO2 mmol/L 25.0  --  21.8* 26.1   BUN mg/dL 21  --  14 16   CREATININE mg/dL 0.70  --  0.54* 0.73   CALCIUM mg/dL 8.5*  --  8.4* 9.0   ALBUMIN g/dL 3.5  --   --  3.9   BILIRUBIN mg/dL 0.4  --   --  0.5   ALK PHOS U/L 123*  --   --  143*   ALT (SGPT) U/L 21  --   --  26   AST (SGOT) U/L 19  --   --  36*   GLUCOSE mg/dL 214*  --  133* 84         Assessment & Plan    1. Chronic lymphocytic leukemia  -stage 0 since 2014 untreated  White count increased to 32,000 in 12/22 after prednisone pack.    1/11/2024 reviewed with patient slight trend and WBC up to 18.25, ALC up to 14%.  She has no other concerning findings clinically and we discussed continued observation for now.  White count increased to 45,000 in 10/24 on prednisone 40 mg    2.   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  The patient received IVIG treatments October 2020 through March 2021.  We have held IVIG since that time and the patient is happy to report no infections aside from some urinary tract infection which she has chronically.  Otherwise she has been feeling very well.  Resumes monthly IVIG from October through March on a yearly basis  Resuming IV IgG from October 2024 to March 2025    3.  COPD/asthma /emphysema on home O2-recurrent respiratory infections with RSV and rhinovirus and bacteria    4.  Atrial fibrillation on Coumadin.  Stable with no bleeding.    5.  Hypertension/hypercholesterolemia/type 2 diabetes on treatment    6.  Vaccinated against coronavirus        Plan:  Proceed with 30g IVIG for hypogammaglobulinemia.  This is being given monthly October through March Return in 1 /2month for IVIG 30 g   Return in 3 months for follow-up with Dr. Larkin and  IVIG of this season.    Keep an eye on WBC/ALC in setting of CLL.  Discussed course of continued observation for now.      This patient is on high risk drug therapy requiring intensive monitoring for toxicity.        Lucien Larkin MD  10/03/2024

## 2024-10-04 ENCOUNTER — ANTICOAGULATION VISIT (OUTPATIENT)
Dept: PHARMACY | Facility: HOSPITAL | Age: 89
End: 2024-10-04
Payer: MEDICARE

## 2024-10-04 DIAGNOSIS — I48.0 PAROXYSMAL ATRIAL FIBRILLATION: Primary | ICD-10-CM

## 2024-10-04 LAB — INR PPP: 2.4

## 2024-10-04 NOTE — PROGRESS NOTES
Anticoagulation Clinic Progress Note    Anticoagulation Summary  As of 10/4/2024      INR goal:  2.0-3.0   TTR:  65.5% (5.7 y)   INR used for dosin.40 (10/4/2024)   Warfarin maintenance plan:  2 mg every Tue, Thu; 4 mg all other days   Weekly warfarin total:  24 mg   Plan last modified:  Petra Oliveira RPH (2024)   Next INR check:  10/7/2024   Priority:  Maintenance   Target end date:  Indefinite    Indications    Paroxysmal atrial fibrillation [I48.0]                 Anticoagulation Episode Summary       INR check location:      Preferred lab:      Send INR reminders to:   [facility]  POOL    Comments:  [facility] Home lab beginning 20          Anticoagulation Care Providers       Provider Role Specialty Phone number    Jonah Regalado Jr., MD Referring Cardiology [phone]            INR History:      2024     3:34 PM 2024     6:08 PM 2024    11:22 AM 10/2/2024    12:00 AM 10/3/2024     7:58 AM 10/4/2024    12:00 AM 10/4/2024     2:38 PM   Anticoagulation Monitoring   INR 1.80    1.90  2.40   INR Date 2024    10/2/2024  10/4/2024   INR Goal 2.0-3.0  2.0-3.0  2.0-3.0  2.0-3.0   Trend Same  Same  Same  Same   Last Week Total 20 mg  18 mg  22 mg  22 mg   Next Week Total 24 mg  24 mg  24 mg  22 mg   Sun 4 mg  -  -  2 mg (10/6)   Mon 4 mg  4 mg  -  -   Tue 2 mg  2 mg  -  -   Wed -  -  -  -   Thu -  -  2 mg  -   Fri 4 mg  -  -  4 mg   Sat 4 mg  -  -  4 mg   Historical INR  1.70   1.90      2.40        Visit Report   Report  Report         This result is from an external source.       Plan:  1. INR is Therapeutic today- see above in Anticoagulation Summary.   Provided instructions to Glenis with [facility] to change their warfarin regimen- see above in Anticoagulation Summary.  Patient will be on prednisone until 10/10. Spoke with Glenis (A nurse). Patient has been taking 40 mg daily of prednisone per MD recommendations rather than tapering down to 20 mg. Continue 2 mg  on sun, tues, thurs and 4 mg AOD, rck on Monday (Nurse is going out MWF and plans to check at each visit)   2. Follow up in 3 days      Petra Oliveira RPH

## 2024-10-09 ENCOUNTER — ANTICOAGULATION VISIT (OUTPATIENT)
Dept: PHARMACY | Facility: HOSPITAL | Age: 89
End: 2024-10-09
Payer: MEDICARE

## 2024-10-09 DIAGNOSIS — I48.0 PAROXYSMAL ATRIAL FIBRILLATION: Primary | ICD-10-CM

## 2024-10-09 LAB — INR PPP: 4.2

## 2024-10-09 NOTE — PROGRESS NOTES
Anticoagulation Clinic Progress Note    Anticoagulation Summary  As of 10/9/2024      INR goal:  2.0-3.0   TTR:  65.4% (5.8 y)   INR used for dosin.20 (10/9/2024)   Warfarin maintenance plan:  2 mg every Tue, Thu; 4 mg all other days   Weekly warfarin total:  24 mg   Plan last modified:  Petra Oliveira RPH (2024)   Next INR check:  10/11/2024   Priority:  Maintenance   Target end date:  Indefinite    Indications    Paroxysmal atrial fibrillation [I48.0]                 Anticoagulation Episode Summary       INR check location:      Preferred lab:      Send INR reminders to:   [facility]  POOL    Comments:  [facility] lab beginning 20          Anticoagulation Care Providers       Provider Role Specialty Phone number    Jonah Regalado Jr., MD Referring Cardiology [phone]            INR History:      2024    11:22 AM 10/2/2024    12:00 AM 10/3/2024     7:58 AM 10/4/2024    12:00 AM 10/4/2024     2:38 PM 10/9/2024    12:00 AM 10/9/2024    10:18 AM   Anticoagulation Monitoring   INR   1.90  2.40  4.20   INR Date   10/2/2024  10/4/2024  10/9/2024   INR Goal 2.0-3.0  2.0-3.0  2.0-3.0  2.0-3.0   Trend Same  Same  Same  Same   Last Week Total 18 mg  22 mg  22 mg  24 mg   Next Week Total 24 mg  24 mg  22 mg  20 mg   Sun -  -  2 mg (10/6)  -   Mon 4 mg  -  -  -   Tue 2 mg  -  -  -   Wed -  -  -  Hold (10/9)   Thu -  2 mg  -  2 mg   Fri -  -  4 mg  -   Sat -  -  4 mg  -   Historical INR  1.90      2.40      4.20        Visit Report Report  Report           This result is from an external source.       Plan:  1. INR is Supratherapeutic today- see above in Anticoagulation Summary.   Provided instructions to Glenis with [facility] to Change their warfarin regimen- see above in Anticoagulation Summary.  Patient took 4 ng on  instead of 2 mg. Hold warfarin today and then recheck on Friday (Glenis-[facility])   2. Follow up in 2 days      Petra Oliveira RPH

## 2024-10-11 ENCOUNTER — ANTICOAGULATION VISIT (OUTPATIENT)
Dept: PHARMACY | Facility: HOSPITAL | Age: 89
End: 2024-10-11
Payer: MEDICARE

## 2024-10-11 DIAGNOSIS — I48.0 PAROXYSMAL ATRIAL FIBRILLATION: Primary | ICD-10-CM

## 2024-10-11 LAB — INR PPP: 2.2

## 2024-10-11 NOTE — PROGRESS NOTES
Anticoagulation Clinic Progress Note    Anticoagulation Summary  As of 10/11/2024      INR goal:  2.0-3.0   TTR:  65.4% (5.8 y)   INR used for dosin.20 (10/11/2024)   Warfarin maintenance plan:  2 mg every Sun, Tue, Thu; 4 mg all other days   Weekly warfarin total:  22 mg   Plan last modified:  Jaylin Nick, PharmD (10/11/2024)   Next INR check:  10/16/2024   Priority:  Maintenance   Target end date:  Indefinite    Indications    Paroxysmal atrial fibrillation [I48.0]                 Anticoagulation Episode Summary       INR check location:      Preferred lab:      Send INR reminders to:   [facility]  POOL    Comments:  [facility] lab beginning 20          Anticoagulation Care Providers       Provider Role Specialty Phone number    Jonah Regalado Jr., MD Referring Cardiology [phone]            Clinic Interview:  Patient Findings     Negatives:  Signs/symptoms of thrombosis, Signs/symptoms of bleeding,   Laboratory test error suspected, Change in health, Change in alcohol use,   Change in activity, Upcoming invasive procedure, Emergency department   visit, Upcoming dental procedure, Missed doses, Extra doses, Change in   medications, Change in diet/appetite, Hospital admission, Bruising, Other   complaints    Comments:  Still taking prednisone.      Clinical Outcomes     Negatives:  Major bleeding event, Thromboembolic event,   Anticoagulation-related hospital admission, Anticoagulation-related ED   visit, Anticoagulation-related fatality    Comments:  Still taking prednisone.        INR History:      10/3/2024     7:58 AM 10/4/2024    12:00 AM 10/4/2024     2:38 PM 10/9/2024    12:00 AM 10/9/2024    10:18 AM 10/11/2024    12:00 AM 10/11/2024     2:27 PM   Anticoagulation Monitoring   INR 1.90  2.40  4.20  2.20   INR Date 10/2/2024  10/4/2024  10/9/2024  10/11/2024   INR Goal 2.0-3.0  2.0-3.0  2.0-3.0  2.0-3.0   Trend Same  Same  Same  Down   Last Week Total 22 mg  22 mg  24 mg  20  mg   Next Week Total 24 mg  22 mg  20 mg  22 mg   Sun -  2 mg (10/6)  -  2 mg   Mon -  -  -  4 mg   Tue -  -  -  2 mg   Wed -  -  Hold (10/9)  -   Thu 2 mg  -  2 mg  -   Fri -  4 mg  -  4 mg   Sat -  4 mg  -  4 mg   Historical INR  2.40      4.20      2.20        Visit Report Report             This result is from an external source.       Plan:  1. INR is Therapeutic today- see above in Anticoagulation Summary.   Will instruct Caitlyn Longoria to Continue their warfarin regimen- see above in Anticoagulation Summary.  2. Follow up in 5 days   3. They have been instructed to call if any changes in medications, doses, concerns, etc. Patient expresses understanding and has no further questions at this time. Instructions also provided to Glenis with [facility].    Jaylin Nick, JoseluisD

## 2024-10-14 ENCOUNTER — OFFICE VISIT (OUTPATIENT)
Dept: WOUND CARE | Facility: HOSPITAL | Age: 89
End: 2024-10-14
Payer: MEDICARE

## 2024-10-16 ENCOUNTER — TELEPHONE (OUTPATIENT)
Dept: CARDIOLOGY | Facility: CLINIC | Age: 89
End: 2024-10-16
Payer: MEDICARE

## 2024-10-16 NOTE — TELEPHONE ENCOUNTER
Petra, Home Health Nurse with [facility], left msg saying they are unable to get pt's INR today because she is not home/not answering the phone.  They will try to reach the pt again tomorrow.

## 2024-10-18 ENCOUNTER — ANTICOAGULATION VISIT (OUTPATIENT)
Dept: PHARMACY | Facility: HOSPITAL | Age: 89
End: 2024-10-18
Payer: MEDICARE

## 2024-10-18 DIAGNOSIS — I48.0 PAROXYSMAL ATRIAL FIBRILLATION: Primary | ICD-10-CM

## 2024-10-18 LAB — INR PPP: 3.9

## 2024-10-18 NOTE — TELEPHONE ENCOUNTER
MARIBEL:    Petra with home health called to inform you that they will go to her house today to check her INR.

## 2024-10-18 NOTE — PROGRESS NOTES
Anticoagulation Clinic Progress Note    Anticoagulation Summary  As of 10/18/2024      INR goal:  2.0-3.0   TTR:  65.3% (5.8 y)   INR used for dosing:  3.90 (10/18/2024)   Warfarin maintenance plan:  2 mg every Sun, Tue, Thu; 4 mg all other days   Weekly warfarin total:  22 mg   Plan last modified:  Jaylin Nick PharmD (10/11/2024)   Next INR check:  10/21/2024   Priority:  Maintenance   Target end date:  Indefinite    Indications    Paroxysmal atrial fibrillation [I48.0]                 Anticoagulation Episode Summary       INR check location:  --    Preferred lab:  --    Send INR reminders to:   [facility]  POOL    Comments:  [facility] beginning 4/22/20          Anticoagulation Care Providers       Provider Role Specialty Phone number    Jonah Regalado Jr., MD Referring Cardiology [phone]            INR History:      10/4/2024     2:38 PM 10/9/2024    12:00 AM 10/9/2024    10:18 AM 10/11/2024    12:00 AM 10/11/2024     2:27 PM 10/18/2024    12:00 AM 10/18/2024     3:39 PM   Anticoagulation Monitoring   INR 2.40  4.20  2.20  3.90   INR Date 10/4/2024  10/9/2024  10/11/2024  10/18/2024   INR Goal 2.0-3.0  2.0-3.0  2.0-3.0  2.0-3.0   Trend Same  Same  Down  Same   Last Week Total 22 mg  24 mg  20 mg  22 mg   Next Week Total 22 mg  20 mg  22 mg  16 mg   Sun 2 mg (10/6)  -  2 mg  2 mg   Mon -  -  4 mg  -   Tue -  -  2 mg  -   Wed -  Hold (10/9)  -  -   Thu -  2 mg  -  -   Fri 4 mg  -  4 mg  Hold (10/18)   Sat 4 mg  -  4 mg  2 mg (10/19)   Historical INR  4.20      2.20      3.90            This result is from an external source.       Plan:  1. INR is Supratherapeutic today- see above in Anticoagulation Summary.   Provided instructions to Petra with [facility].  Hold today, then 2 mg daily until INR check on Monday.   2. Follow up in 3 days      Joseluis KinseyD

## 2024-10-21 ENCOUNTER — ANTICOAGULATION VISIT (OUTPATIENT)
Dept: PHARMACY | Facility: HOSPITAL | Age: 89
End: 2024-10-21
Payer: MEDICARE

## 2024-10-21 DIAGNOSIS — I48.0 PAROXYSMAL ATRIAL FIBRILLATION: Primary | ICD-10-CM

## 2024-10-21 LAB — INR PPP: 2

## 2024-10-21 NOTE — PROGRESS NOTES
Anticoagulation Clinic Progress Note    Anticoagulation Summary  As of 10/21/2024      INR goal:  2.0-3.0   TTR:  65.3% (5.8 y)   INR used for dosin.00 (10/21/2024)   Warfarin maintenance plan:  4 mg every Mon, Wed, Fri; 2 mg all other days   Weekly warfarin total:  20 mg   Plan last modified:  Petra Oliveira RPH (10/21/2024)   Next INR check:  10/25/2024   Priority:  Maintenance   Target end date:  Indefinite    Indications    Paroxysmal atrial fibrillation [I48.0]                 Anticoagulation Episode Summary       INR check location:  --    Preferred lab:  --    Send INR reminders to:   [facility]  POOL    Comments:  [facility] beginning 20          Anticoagulation Care Providers       Provider Role Specialty Phone number    Jonah Regalado Jr., MD Referring Cardiology [phone]            Clinic Interview:  Patient Findings     Negatives:  Signs/symptoms of thrombosis, Signs/symptoms of bleeding,   Laboratory test error suspected, Change in health, Change in alcohol use,   Change in activity, Upcoming invasive procedure, Emergency department   visit, Upcoming dental procedure, Missed doses, Extra doses, Change in   medications, Change in diet/appetite, Hospital admission, Bruising, Other   complaints      Clinical Outcomes     Negatives:  Major bleeding event, Thromboembolic event,   Anticoagulation-related hospital admission, Anticoagulation-related ED   visit, Anticoagulation-related fatality        INR History:      10/9/2024    10:18 AM 10/11/2024    12:00 AM 10/11/2024     2:27 PM 10/18/2024    12:00 AM 10/18/2024     3:39 PM 10/21/2024    12:00 AM 10/21/2024    11:24 AM   Anticoagulation Monitoring   INR 4.20  2.20  3.90  2.00   INR Date 10/9/2024  10/11/2024  10/18/2024  10/21/2024   INR Goal 2.0-3.0  2.0-3.0  2.0-3.0  2.0-3.0   Trend Same  Down  Same  Down   Last Week Total 24 mg  20 mg  22 mg  16 mg   Next Week Total 20 mg  22 mg  16 mg  20 mg   Sun -  2 mg  2 mg  -    Mon -  4 mg  -  4 mg   Tue -  2 mg  -  2 mg   Wed Hold (10/9)  -  -  4 mg   Thu 2 mg  -  -  2 mg   Fri -  4 mg  Hold (10/18)  -   Sat -  4 mg  2 mg (10/19)  -   Historical INR  2.20      3.90      2.00            This result is from an external source.       Plan:  1. INR is Therapeutic today- see above in Anticoagulation Summary.   Will instruct Caitlyn Longoria to Change their warfarin regimen- see above in Anticoagulation Summary.   trial on 4 mg MWF, 2 mg AOD, rck 1 Friday (Glenis/[facility])  2. Follow up on Friday   3. They have been instructed to call if any changes in medications, doses, concerns, etc. Patient expresses understanding and has no further questions at this time.    Petra Oliveira [facility]

## 2024-10-25 ENCOUNTER — ANTICOAGULATION VISIT (OUTPATIENT)
Dept: PHARMACY | Facility: HOSPITAL | Age: 89
End: 2024-10-25
Payer: MEDICARE

## 2024-10-25 DIAGNOSIS — I48.0 PAROXYSMAL ATRIAL FIBRILLATION: Primary | ICD-10-CM

## 2024-10-25 LAB — INR PPP: 2.7

## 2024-10-25 NOTE — PROGRESS NOTES
Anticoagulation Clinic Progress Note    Anticoagulation Summary  As of 10/25/2024      INR goal:  2.0-3.0   TTR:  65.4% (5.8 y)   INR used for dosin.70 (10/25/2024)   Warfarin maintenance plan:  4 mg every Mon, Wed, Fri; 2 mg all other days   Weekly warfarin total:  20 mg   No change documented:  Petra Oliveira RPH   Plan last modified:  Petra Oliveira RPH (10/21/2024)   Next INR check:  2024   Priority:  Maintenance   Target end date:  Indefinite    Indications    Paroxysmal atrial fibrillation [I48.0]                 Anticoagulation Episode Summary       INR check location:  --    Preferred lab:  --    Send INR reminders to:  [facility]  POOL    Comments:  [facility] beginning 20          Anticoagulation Care Providers       Provider Role Specialty Phone number    Jonah Regalado Jr., MD Referring Cardiology [phone]            INR History:      10/11/2024     2:27 PM 10/18/2024    12:00 AM 10/18/2024     3:39 PM 10/21/2024    12:00 AM 10/21/2024    11:24 AM 10/25/2024    12:00 AM 10/25/2024    11:13 AM   Anticoagulation Monitoring   INR 2.20  3.90  2.00  2.70   INR Date 10/11/2024  10/18/2024  10/21/2024  10/25/2024   INR Goal 2.0-3.0  2.0-3.0  2.0-3.0  2.0-3.0   Trend Down  Same  Down  Same   Last Week Total 20 mg  22 mg  16 mg  16 mg   Next Week Total 22 mg  16 mg  20 mg  20 mg   Sun 2 mg  2 mg  -  2 mg   Mon 4 mg  -  4 mg  4 mg   Tue 2 mg  -  2 mg  2 mg   Wed -  -  4 mg  4 mg   Thu -  -  2 mg  2 mg   Fri 4 mg  Hold (10/18)  -  4 mg   Sat 4 mg  2 mg (10/19)  -  2 mg   Historical INR  3.90      2.00      2.70            This result is from an external source.       Plan:  1. INR is Therapeutic today- see above in Anticoagulation Summary.   Provided instructions to Glenis with [facility] to Continue their warfarin regimen- see above in Anticoagulation Summary.  Continue 4 mg MWF, 2 mg AOD, rck on Wednesday (likely to be discharged next week)  2. Follow up in 1  monty Oliveira, [facility]

## 2024-10-28 ENCOUNTER — OFFICE VISIT (OUTPATIENT)
Dept: WOUND CARE | Facility: HOSPITAL | Age: 89
End: 2024-10-28
Payer: MEDICARE

## 2024-10-30 ENCOUNTER — ANTICOAGULATION VISIT (OUTPATIENT)
Dept: PHARMACY | Facility: HOSPITAL | Age: 89
End: 2024-10-30
Payer: MEDICARE

## 2024-10-30 DIAGNOSIS — I48.0 PAROXYSMAL ATRIAL FIBRILLATION: Primary | ICD-10-CM

## 2024-10-30 LAB — INR PPP: 2.3

## 2024-10-30 NOTE — PROGRESS NOTES
Anticoagulation Clinic Progress Note    Anticoagulation Summary  As of 10/30/2024      INR goal:  2.0-3.0   TTR:  65.4% (5.8 y)   INR used for dosin.30 (10/30/2024)   Warfarin maintenance plan:  4 mg every Mon, Wed, Fri; 2 mg all other days   Weekly warfarin total:  20 mg   No change documented:  Petra Oliveira RPH   Plan last modified:  Petra Oliveira RPH (10/21/2024)   Next INR check:  2024   Priority:  Maintenance   Target end date:  Indefinite    Indications    Paroxysmal atrial fibrillation [I48.0]                 Anticoagulation Episode Summary       INR check location:  --    Preferred lab:  --    Send INR reminders to:  [facility]  POOL    Comments:  [facility] lab beginning 20          Anticoagulation Care Providers       Provider Role Specialty Phone number    Jonah Regalado Jr., MD Referring Cardiology [phone]            INR History:      10/18/2024     3:39 PM 10/21/2024    12:00 AM 10/21/2024    11:24 AM 10/25/2024    12:00 AM 10/25/2024    11:13 AM 10/30/2024    12:00 AM 10/30/2024     1:52 PM   Anticoagulation Monitoring   INR 3.90  2.00  2.70  2.30   INR Date 10/18/2024  10/21/2024  10/25/2024  10/30/2024   INR Goal 2.0-3.0  2.0-3.0  2.0-3.0  2.0-3.0   Trend Same  Down  Same  Same   Last Week Total 22 mg  16 mg  16 mg  20 mg   Next Week Total 16 mg  20 mg  20 mg  20 mg   Sun 2 mg  -  2 mg  2 mg   Mon -  4 mg  4 mg  4 mg   Tue -  2 mg  2 mg  2 mg   Wed -  4 mg  4 mg  4 mg   Thu -  2 mg  2 mg  2 mg   Fri Hold (10/18)  -  4 mg  4 mg   Sat 2 mg (10/19)  -  2 mg  2 mg   Historical INR  2.00      2.70      2.30            This result is from an external source.       Plan:  1. INR is Therapeutic today- see above in Anticoagulation Summary.   Provided instructions to Glenis with [facility] to Continue their warfarin regimen- see above in Anticoagulation Summary.  2. Follow up in 1 week      Petra Oliveira RPH

## 2024-10-31 ENCOUNTER — INFUSION (OUTPATIENT)
Dept: ONCOLOGY | Facility: HOSPITAL | Age: 89
End: 2024-10-31
Payer: MEDICARE

## 2024-10-31 VITALS
TEMPERATURE: 97.8 F | BODY MASS INDEX: 32.25 KG/M2 | RESPIRATION RATE: 20 BRPM | HEART RATE: 80 BPM | DIASTOLIC BLOOD PRESSURE: 79 MMHG | WEIGHT: 182 LBS | SYSTOLIC BLOOD PRESSURE: 123 MMHG | OXYGEN SATURATION: 91 %

## 2024-10-31 DIAGNOSIS — C91.12 CLL (CHRONIC LYMPHOID LEUKEMIA) IN RELAPSE: ICD-10-CM

## 2024-10-31 DIAGNOSIS — C91.10 CLL (CHRONIC LYMPHOCYTIC LEUKEMIA): Primary | ICD-10-CM

## 2024-10-31 LAB
BASOPHILS # BLD AUTO: 0.04 10*3/MM3 (ref 0–0.2)
BASOPHILS NFR BLD AUTO: 0.1 % (ref 0–1.5)
DEPRECATED RDW RBC AUTO: 57.8 FL (ref 37–54)
EOSINOPHIL # BLD AUTO: 0 10*3/MM3 (ref 0–0.4)
EOSINOPHIL NFR BLD AUTO: 0 % (ref 0.3–6.2)
ERYTHROCYTE [DISTWIDTH] IN BLOOD BY AUTOMATED COUNT: 15.7 % (ref 12.3–15.4)
HCT VFR BLD AUTO: 37.3 % (ref 34–46.6)
HGB BLD-MCNC: 11.9 G/DL (ref 12–15.9)
IGA1 MFR SER: <50 MG/DL (ref 70–400)
IGG1 SER-MCNC: 750 MG/DL (ref 700–1600)
IGM SERPL-MCNC: <25 MG/DL (ref 40–230)
IMM GRANULOCYTES # BLD AUTO: 0.07 10*3/MM3 (ref 0–0.05)
IMM GRANULOCYTES NFR BLD AUTO: 0.3 % (ref 0–0.5)
LYMPHOCYTES # BLD AUTO: 24.34 10*3/MM3 (ref 0.7–3.1)
LYMPHOCYTES NFR BLD AUTO: 88.5 % (ref 19.6–45.3)
MCH RBC QN AUTO: 32.2 PG (ref 26.6–33)
MCHC RBC AUTO-ENTMCNC: 31.9 G/DL (ref 31.5–35.7)
MCV RBC AUTO: 101.1 FL (ref 79–97)
MONOCYTES # BLD AUTO: 0.18 10*3/MM3 (ref 0.1–0.9)
MONOCYTES NFR BLD AUTO: 0.7 % (ref 5–12)
NEUTROPHILS NFR BLD AUTO: 10.4 % (ref 42.7–76)
NEUTROPHILS NFR BLD AUTO: 2.86 10*3/MM3 (ref 1.7–7)
NRBC BLD AUTO-RTO: 0 /100 WBC (ref 0–0.2)
PLATELET # BLD AUTO: 152 10*3/MM3 (ref 140–450)
PMV BLD AUTO: 9.9 FL (ref 6–12)
RBC # BLD AUTO: 3.69 10*6/MM3 (ref 3.77–5.28)
WBC NRBC COR # BLD AUTO: 27.49 10*3/MM3 (ref 3.4–10.8)

## 2024-10-31 PROCEDURE — 96365 THER/PROPH/DIAG IV INF INIT: CPT

## 2024-10-31 PROCEDURE — 96366 THER/PROPH/DIAG IV INF ADDON: CPT

## 2024-10-31 PROCEDURE — 85025 COMPLETE CBC W/AUTO DIFF WBC: CPT

## 2024-10-31 PROCEDURE — 63710000001 ACETAMINOPHEN 325 MG TABLET: Performed by: NURSE PRACTITIONER

## 2024-10-31 PROCEDURE — 25010000002 IMMUNE GLOBULIN (HUMAN) 30 GM/300ML SOLUTION: Performed by: NURSE PRACTITIONER

## 2024-10-31 PROCEDURE — 82784 ASSAY IGA/IGD/IGG/IGM EACH: CPT | Performed by: INTERNAL MEDICINE

## 2024-10-31 PROCEDURE — 63710000001 HYDROXYZINE PAMOATE PER 25 MG: Performed by: NURSE PRACTITIONER

## 2024-10-31 PROCEDURE — A9270 NON-COVERED ITEM OR SERVICE: HCPCS | Performed by: NURSE PRACTITIONER

## 2024-10-31 RX ORDER — SODIUM CHLORIDE 9 MG/ML
250 INJECTION, SOLUTION INTRAVENOUS ONCE
Status: DISCONTINUED | OUTPATIENT
Start: 2024-10-31 | End: 2024-10-31 | Stop reason: HOSPADM

## 2024-10-31 RX ORDER — ACETAMINOPHEN 325 MG/1
650 TABLET ORAL ONCE
Status: COMPLETED | OUTPATIENT
Start: 2024-10-31 | End: 2024-10-31

## 2024-10-31 RX ORDER — ACETAMINOPHEN 325 MG/1
650 TABLET ORAL ONCE
Status: CANCELLED | OUTPATIENT
Start: 2024-10-31

## 2024-10-31 RX ORDER — DIPHENHYDRAMINE HYDROCHLORIDE 50 MG/ML
50 INJECTION INTRAMUSCULAR; INTRAVENOUS AS NEEDED
Status: CANCELLED | OUTPATIENT
Start: 2024-10-31

## 2024-10-31 RX ORDER — SODIUM CHLORIDE 9 MG/ML
250 INJECTION, SOLUTION INTRAVENOUS ONCE
Status: CANCELLED | OUTPATIENT
Start: 2024-10-31

## 2024-10-31 RX ORDER — HYDROXYZINE PAMOATE 25 MG/1
25 CAPSULE ORAL ONCE
Status: CANCELLED | OUTPATIENT
Start: 2024-10-31

## 2024-10-31 RX ORDER — HYDROXYZINE PAMOATE 25 MG/1
25 CAPSULE ORAL ONCE
Status: COMPLETED | OUTPATIENT
Start: 2024-10-31 | End: 2024-10-31

## 2024-10-31 RX ORDER — FAMOTIDINE 10 MG/ML
20 INJECTION, SOLUTION INTRAVENOUS AS NEEDED
Status: CANCELLED | OUTPATIENT
Start: 2024-10-31

## 2024-10-31 RX ADMIN — ACETAMINOPHEN 650 MG: 325 TABLET ORAL at 08:54

## 2024-10-31 RX ADMIN — HYDROXYZINE PAMOATE 25 MG: 25 CAPSULE ORAL at 08:55

## 2024-10-31 RX ADMIN — IMMUNE GLOBULIN INFUSION (HUMAN) 30 G: 100 INJECTION, SOLUTION INTRAVENOUS; SUBCUTANEOUS at 09:23

## 2024-11-04 ENCOUNTER — OFFICE VISIT (OUTPATIENT)
Dept: WOUND CARE | Facility: HOSPITAL | Age: 89
End: 2024-11-04
Payer: MEDICARE

## 2024-11-06 ENCOUNTER — ANTICOAGULATION VISIT (OUTPATIENT)
Dept: PHARMACY | Facility: HOSPITAL | Age: 89
End: 2024-11-06
Payer: MEDICARE

## 2024-11-06 DIAGNOSIS — I48.0 PAROXYSMAL ATRIAL FIBRILLATION: Primary | ICD-10-CM

## 2024-11-06 LAB — INR PPP: 2.9

## 2024-11-06 NOTE — PROGRESS NOTES
Anticoagulation Clinic Progress Note    Anticoagulation Summary  As of 2024      INR goal:  2.0-3.0   TTR:  65.6% (5.8 y)   INR used for dosin.90 (2024)   Warfarin maintenance plan:  4 mg every Mon, Wed, Fri; 2 mg all other days   Weekly warfarin total:  20 mg   No change documented:  Alexander Valiente, PharmD   Plan last modified:  Petra Oliveira RPH (10/21/2024)   Next INR check:  2024   Priority:  Maintenance   Target end date:  Indefinite    Indications    Paroxysmal atrial fibrillation [I48.0]                 Anticoagulation Episode Summary       INR check location:  --    Preferred lab:  --    Send INR reminders to:  [facility]  POOL    Comments:  [facility] lab beginning 20          Anticoagulation Care Providers       Provider Role Specialty Phone number    Jonah Regalado Jr., MD Referring Cardiology [phone]            INR History:      10/21/2024    11:24 AM 10/25/2024    12:00 AM 10/25/2024    11:13 AM 10/30/2024    12:00 AM 10/30/2024     1:52 PM 2024    12:00 AM 2024     1:30 PM   Anticoagulation Monitoring   INR 2.00  2.70  2.30  2.90   INR Date 10/21/2024  10/25/2024  10/30/2024  2024   INR Goal 2.0-3.0  2.0-3.0  2.0-3.0  2.0-3.0   Trend Down  Same  Same  Same   Last Week Total 16 mg  16 mg  20 mg  20 mg   Next Week Total 20 mg  20 mg  20 mg  20 mg   Sun -  2 mg  2 mg  2 mg   Mon 4 mg  4 mg  4 mg  4 mg   Tue 2 mg  2 mg  2 mg  2 mg   Wed 4 mg  4 mg  4 mg  4 mg   Thu 2 mg  2 mg  2 mg  2 mg   Fri -  4 mg  4 mg  4 mg   Sat -  2 mg  2 mg  2 mg   Historical INR  2.70      2.30      2.90            This result is from an external source.       Plan:  1. INR is Therapeutic today- see above in Anticoagulation Summary.   Provided instructions to Glenis with [facility] to Continue their warfarin regimen- see above in Anticoagulation Summary.  2. Follow up in 1 week (final visit from [facility]).      Alexander Valiente, PharmD

## 2024-11-14 PROCEDURE — 93294 REM INTERROG EVL PM/LDLS PM: CPT | Performed by: INTERNAL MEDICINE

## 2024-11-14 PROCEDURE — 93296 REM INTERROG EVL PM/IDS: CPT | Performed by: INTERNAL MEDICINE

## 2024-11-15 ENCOUNTER — TELEPHONE (OUTPATIENT)
Dept: CARDIOLOGY | Facility: CLINIC | Age: 89
End: 2024-11-15
Payer: MEDICARE

## 2024-11-15 ENCOUNTER — ANTICOAGULATION VISIT (OUTPATIENT)
Dept: PHARMACY | Facility: HOSPITAL | Age: 89
End: 2024-11-15
Payer: MEDICARE

## 2024-11-15 DIAGNOSIS — I48.0 PAROXYSMAL ATRIAL FIBRILLATION: Primary | ICD-10-CM

## 2024-11-15 LAB — INR PPP: 2.5

## 2024-11-15 NOTE — PROGRESS NOTES
Anticoagulation Clinic Progress Note    Anticoagulation Summary  As of 11/15/2024      INR goal:  2.0-3.0   TTR:  65.7% (5.9 y)   INR used for dosin.50 (11/15/2024)   Warfarin maintenance plan:  4 mg every Mon, Wed, Fri; 2 mg all other days   Weekly warfarin total:  20 mg   No change documented:  Jaylin Nick, PharmD   Plan last modified:  Petra Oliveira RPH (10/21/2024)   Next INR check:  2024   Priority:  Maintenance   Target end date:  Indefinite    Indications    Paroxysmal atrial fibrillation [I48.0]                 Anticoagulation Episode Summary       INR check location:  --    Preferred lab:  --    [facility]    Comments:  [facility] beginning 20          Anticoagulation Care Providers       Provider Role Specialty Phone number    Jonah Regalado Jr., MD Referring Cardiology [phone]            Clinic Interview:  Patient Findings     Negatives:  Signs/symptoms of thrombosis, Signs/symptoms of bleeding,   Laboratory test error suspected, Change in health, Change in alcohol use,   Change in activity, Upcoming invasive procedure, Emergency department   visit, Upcoming dental procedure, Missed doses, Extra doses, Change in   medications, Change in diet/appetite, Hospital admission, Bruising, Other   complaints    Comments:  Discharging from [facility] so INR checks will resume at   [facility].       Clinical Outcomes     Negatives:  Major bleeding event, Thromboembolic event,   Anticoagulation-related hospital admission, Anticoagulation-related ED   visit, Anticoagulation-related fatality    Comments:  Discharging from [facility] so INR checks will resume at   [facility].         INR History:      10/25/2024    11:13 AM 10/30/2024    12:00 AM 10/30/2024     1:52 PM 2024    12:00 AM 2024     1:30 PM 11/15/2024    12:00 AM 11/15/2024     9:44 AM   Anticoagulation Monitoring   INR 2.70  2.30  2.90  2.50    INR Date 10/25/2024  10/30/2024  11/6/2024  11/15/2024   INR Goal 2.0-3.0  2.0-3.0  2.0-3.0  2.0-3.0   Trend Same  Same  Same  Same   Last Week Total 16 mg  20 mg  20 mg  20 mg   Next Week Total 20 mg  20 mg  20 mg  20 mg   Sun 2 mg  2 mg  2 mg  2 mg   Mon 4 mg  4 mg  4 mg  4 mg   Tue 2 mg  2 mg  2 mg  2 mg   Wed 4 mg  4 mg  4 mg  4 mg   Thu 2 mg  2 mg  2 mg  2 mg   Fri 4 mg  4 mg  4 mg  4 mg   Sat 2 mg  2 mg  2 mg  2 mg   Historical INR  2.30      2.90      2.50            This result is from an external source.       Plan:  1. INR is Therapeutic today- see above in Anticoagulation Summary.   Will instruct Caitlyn oLngoria to Continue their warfarin regimen- see above in Anticoagulation Summary.  2. Follow up in 2 weeks  3. Secure voicemail with instructions and follow up plan.  They have been instructed to call if any changes in medications, doses, concerns, etc. Patient expresses understanding and has no further questions at this time.    Jaylin Nick, PharmD

## 2024-11-15 NOTE — TELEPHONE ENCOUNTER
[facility] is calling to check the status of an order that they faxed over to the main office on Monday, she is asking is Dr Regalado could please sign the order and get it faxed back.    [facility] can be reached at [phone]

## 2024-11-18 ENCOUNTER — OFFICE VISIT (OUTPATIENT)
Dept: WOUND CARE | Facility: HOSPITAL | Age: 89
End: 2024-11-18
Payer: MEDICARE

## 2024-11-20 ENCOUNTER — TELEPHONE (OUTPATIENT)
Dept: PHARMACY | Facility: HOSPITAL | Age: 89
End: 2024-11-20
Payer: MEDICARE

## 2024-11-21 ENCOUNTER — TELEPHONE (OUTPATIENT)
Dept: PHARMACY | Facility: HOSPITAL | Age: 89
End: 2024-11-21
Payer: MEDICARE

## 2024-11-21 NOTE — TELEPHONE ENCOUNTER
----- Message from Yumiko REBOLLAR sent at 11/20/2024  3:57 PM EST -----  Regarding: INR / [facility]  Patient is back with HH and starting doxy on 11/21, [facility] will do INR on 11/25.

## 2024-11-25 ENCOUNTER — OFFICE VISIT (OUTPATIENT)
Dept: WOUND CARE | Facility: HOSPITAL | Age: 89
End: 2024-11-25
Payer: MEDICARE

## 2024-11-26 ENCOUNTER — ANTICOAGULATION VISIT (OUTPATIENT)
Dept: PHARMACY | Facility: HOSPITAL | Age: 89
End: 2024-11-26
Payer: MEDICARE

## 2024-11-26 DIAGNOSIS — I48.0 PAROXYSMAL ATRIAL FIBRILLATION: Primary | ICD-10-CM

## 2024-11-26 LAB — INR PPP: 2.7

## 2024-11-26 NOTE — PROGRESS NOTES
Anticoagulation Clinic Progress Note    Anticoagulation Summary  As of 2024      INR goal:  2.0-3.0   TTR:  65.9% (5.9 y)   INR used for dosin.70 (2024)   Warfarin maintenance plan:  4 mg every Mon, Wed, Fri; 2 mg all other days   Weekly warfarin total:  20 mg   No change documented:  Petra Oliveira RPH   Plan last modified:  Petra Oliveira RPH (10/21/2024)   Next INR check:  2024   Priority:  Maintenance   Target end date:  Indefinite    Indications    Paroxysmal atrial fibrillation [I48.0]                 Anticoagulation Episode Summary       INR check location:  --    Preferred lab:  --    [facility]    Comments:  [facility] beginning 20          Anticoagulation Care Providers       Provider Role Specialty Phone number    Jonah Regalado Jr., MD Referring Cardiology [phone]            INR History:      10/30/2024     1:52 PM 2024    12:00 AM 2024     1:30 PM 11/15/2024    12:00 AM 11/15/2024     9:44 AM 2024    12:00 AM 2024     3:24 PM   Anticoagulation Monitoring   INR 2.30  2.90  2.50  2.70   INR Date 10/30/2024  2024  11/15/2024  2024   INR Goal 2.0-3.0  2.0-3.0  2.0-3.0  2.0-3.0   Trend Same  Same  Same  Same   Last Week Total 20 mg  20 mg  20 mg  20 mg   Next Week Total 20 mg  20 mg  20 mg  20 mg   Sun 2 mg  2 mg  2 mg  2 mg   Mon 4 mg  4 mg  4 mg  -   Tue 2 mg  2 mg  2 mg  2 mg   Wed 4 mg  4 mg  4 mg  4 mg   Thu 2 mg  2 mg  2 mg  2 mg   Fri 4 mg  4 mg  4 mg  4 mg   Sat 2 mg  2 mg  2 mg  2 mg   Historical INR  2.90      2.50      2.70            This result is from an external source.       Plan:  1. INR is Therapeutic today- see above in Anticoagulation Summary.   Provided instructions to Glenis with [facility] to Continue their warfarin regimen- see above in Anticoagulation Summary.  Day 5 of doxycyline out of 10  2. Follow up in 1 week      Petra Oliveira RPH

## 2024-11-27 ENCOUNTER — INFUSION (OUTPATIENT)
Dept: ONCOLOGY | Facility: HOSPITAL | Age: 89
End: 2024-11-27
Payer: MEDICARE

## 2024-11-27 VITALS
DIASTOLIC BLOOD PRESSURE: 68 MMHG | SYSTOLIC BLOOD PRESSURE: 108 MMHG | WEIGHT: 182.2 LBS | RESPIRATION RATE: 16 BRPM | TEMPERATURE: 96.9 F | OXYGEN SATURATION: 92 % | BODY MASS INDEX: 32.28 KG/M2 | HEART RATE: 95 BPM

## 2024-11-27 DIAGNOSIS — C91.12 CLL (CHRONIC LYMPHOID LEUKEMIA) IN RELAPSE: ICD-10-CM

## 2024-11-27 DIAGNOSIS — C91.10 CLL (CHRONIC LYMPHOCYTIC LEUKEMIA): Primary | ICD-10-CM

## 2024-11-27 LAB
BASOPHILS # BLD AUTO: 0.01 10*3/MM3 (ref 0–0.2)
BASOPHILS NFR BLD AUTO: 0.1 % (ref 0–1.5)
DEPRECATED RDW RBC AUTO: 58.3 FL (ref 37–54)
EOSINOPHIL # BLD AUTO: 0 10*3/MM3 (ref 0–0.4)
EOSINOPHIL NFR BLD AUTO: 0 % (ref 0.3–6.2)
ERYTHROCYTE [DISTWIDTH] IN BLOOD BY AUTOMATED COUNT: 15.4 % (ref 12.3–15.4)
HCT VFR BLD AUTO: 39.2 % (ref 34–46.6)
HGB BLD-MCNC: 12.3 G/DL (ref 12–15.9)
IGA1 MFR SER: <50 MG/DL (ref 70–400)
IGG1 SER-MCNC: 883 MG/DL (ref 700–1600)
IGM SERPL-MCNC: 26 MG/DL (ref 40–230)
IMM GRANULOCYTES # BLD AUTO: 0.03 10*3/MM3 (ref 0–0.05)
IMM GRANULOCYTES NFR BLD AUTO: 0.3 % (ref 0–0.5)
LYMPHOCYTES # BLD AUTO: 8.81 10*3/MM3 (ref 0.7–3.1)
LYMPHOCYTES NFR BLD AUTO: 73.5 % (ref 19.6–45.3)
MCH RBC QN AUTO: 32.1 PG (ref 26.6–33)
MCHC RBC AUTO-ENTMCNC: 31.4 G/DL (ref 31.5–35.7)
MCV RBC AUTO: 102.3 FL (ref 79–97)
MONOCYTES # BLD AUTO: 0.23 10*3/MM3 (ref 0.1–0.9)
MONOCYTES NFR BLD AUTO: 1.9 % (ref 5–12)
NEUTROPHILS NFR BLD AUTO: 2.91 10*3/MM3 (ref 1.7–7)
NEUTROPHILS NFR BLD AUTO: 24.2 % (ref 42.7–76)
NRBC BLD AUTO-RTO: 0 /100 WBC (ref 0–0.2)
PLATELET # BLD AUTO: 167 10*3/MM3 (ref 140–450)
PMV BLD AUTO: 9.7 FL (ref 6–12)
RBC # BLD AUTO: 3.83 10*6/MM3 (ref 3.77–5.28)
WBC NRBC COR # BLD AUTO: 11.99 10*3/MM3 (ref 3.4–10.8)

## 2024-11-27 PROCEDURE — 96365 THER/PROPH/DIAG IV INF INIT: CPT

## 2024-11-27 PROCEDURE — 25010000002 IMMUNE GLOBULIN (HUMAN) 30 GM/300ML SOLUTION: Performed by: INTERNAL MEDICINE

## 2024-11-27 PROCEDURE — 85025 COMPLETE CBC W/AUTO DIFF WBC: CPT

## 2024-11-27 PROCEDURE — 82784 ASSAY IGA/IGD/IGG/IGM EACH: CPT | Performed by: INTERNAL MEDICINE

## 2024-11-27 PROCEDURE — 63710000001 HYDROXYZINE PAMOATE PER 25 MG: Performed by: INTERNAL MEDICINE

## 2024-11-27 PROCEDURE — 96366 THER/PROPH/DIAG IV INF ADDON: CPT

## 2024-11-27 PROCEDURE — A9270 NON-COVERED ITEM OR SERVICE: HCPCS | Performed by: INTERNAL MEDICINE

## 2024-11-27 PROCEDURE — 63710000001 ACETAMINOPHEN 325 MG TABLET: Performed by: INTERNAL MEDICINE

## 2024-11-27 RX ORDER — FAMOTIDINE 10 MG/ML
20 INJECTION, SOLUTION INTRAVENOUS AS NEEDED
Status: CANCELLED | OUTPATIENT
Start: 2024-11-27

## 2024-11-27 RX ORDER — MEPERIDINE HYDROCHLORIDE 50 MG/ML
25 INJECTION INTRAMUSCULAR; INTRAVENOUS; SUBCUTANEOUS
Status: CANCELLED | OUTPATIENT
Start: 2024-11-27 | End: 2024-11-28

## 2024-11-27 RX ORDER — HYDROXYZINE PAMOATE 25 MG/1
25 CAPSULE ORAL ONCE
Status: COMPLETED | OUTPATIENT
Start: 2024-11-27 | End: 2024-11-27

## 2024-11-27 RX ORDER — ACETAMINOPHEN 325 MG/1
650 TABLET ORAL ONCE
Status: COMPLETED | OUTPATIENT
Start: 2024-11-27 | End: 2024-11-27

## 2024-11-27 RX ORDER — DIPHENHYDRAMINE HYDROCHLORIDE 50 MG/ML
50 INJECTION INTRAMUSCULAR; INTRAVENOUS AS NEEDED
Status: CANCELLED | OUTPATIENT
Start: 2024-11-27

## 2024-11-27 RX ORDER — SODIUM CHLORIDE 9 MG/ML
250 INJECTION, SOLUTION INTRAVENOUS ONCE
Status: CANCELLED | OUTPATIENT
Start: 2024-11-27

## 2024-11-27 RX ADMIN — ACETAMINOPHEN 650 MG: 325 TABLET ORAL at 08:48

## 2024-11-27 RX ADMIN — HYDROXYZINE PAMOATE 25 MG: 25 CAPSULE ORAL at 08:48

## 2024-11-27 RX ADMIN — IMMUNE GLOBULIN INFUSION (HUMAN) 30 G: 100 INJECTION, SOLUTION INTRAVENOUS; SUBCUTANEOUS at 09:01

## 2024-11-27 NOTE — PROGRESS NOTES
Patient is receiving IVIG, tolerating it without difficulty and continuing to receive benefit.  Continue IVIG at current dosage and schedule.

## 2024-12-02 ENCOUNTER — ANTICOAGULATION VISIT (OUTPATIENT)
Dept: PHARMACY | Facility: HOSPITAL | Age: 89
End: 2024-12-02
Payer: MEDICARE

## 2024-12-02 DIAGNOSIS — I48.0 PAROXYSMAL ATRIAL FIBRILLATION: Primary | ICD-10-CM

## 2024-12-02 LAB — INR PPP: 4

## 2024-12-02 NOTE — PROGRESS NOTES
Anticoagulation Clinic Progress Note    Anticoagulation Summary  As of 2024      INR goal:  2.0-3.0   TTR:  65.8% (5.9 y)   INR used for dosin.00 (2024)   Warfarin maintenance plan:  4 mg every Mon, Wed, Fri; 2 mg all other days   Weekly warfarin total:  20 mg   Plan last modified:  Petra Oliveira RPH (10/21/2024)   Next INR check:  2024   Priority:  Maintenance   Target end date:  Indefinite    Indications    Paroxysmal atrial fibrillation [I48.0]                 Anticoagulation Episode Summary       INR check location:  --    Preferred lab:  --    Send INR reminders to:   [facility]  POOL    Comments:  [facility] beginning 20          Anticoagulation Care Providers       Provider Role Specialty Phone number    Jonah Regalado Jr., MD Referring Cardiology [phone]            INR History:      2024     1:30 PM 11/15/2024    12:00 AM 11/15/2024     9:44 AM 2024    12:00 AM 2024     3:24 PM 2024    12:00 AM 2024    10:06 AM   Anticoagulation Monitoring   INR 2.90  2.50  2.70  4.00   INR Date 2024  11/15/2024  2024  2024   INR Goal 2.0-3.0  2.0-3.0  2.0-3.0  2.0-3.0   Trend Same  Same  Same  Same   Last Week Total 20 mg  20 mg  20 mg  20 mg   Next Week Total 20 mg  20 mg  20 mg  16 mg   Sun 2 mg  2 mg  2 mg  2 mg   Mon 4 mg  4 mg  -  Hold (); Otherwise 4 mg   Tue 2 mg  2 mg  2 mg  2 mg   Wed 4 mg  4 mg  4 mg  4 mg   Thu 2 mg  2 mg  2 mg  2 mg   Fri 4 mg  4 mg  4 mg  4 mg   Sat 2 mg  2 mg  2 mg  2 mg   Historical INR  2.50      2.70      4.00            This result is from an external source.       Plan:  1. INR is Supratherapeutic today- see above in Anticoagulation Summary.   Provided instructions to Glenis with [facility] to Change their warfarin regimen- see above in Anticoagulation Summary.  reports 1 more day of doxycyline today. Hold and recheck in 1 week   2. Follow up in 1 week      Petra Oliveira RPH

## 2024-12-03 ENCOUNTER — ANTICOAGULATION VISIT (OUTPATIENT)
Dept: PHARMACY | Facility: HOSPITAL | Age: 89
End: 2024-12-03
Payer: MEDICARE

## 2024-12-03 DIAGNOSIS — I48.0 PAROXYSMAL ATRIAL FIBRILLATION: Primary | ICD-10-CM

## 2024-12-03 NOTE — PROGRESS NOTES
Anticoagulation Clinic Progress Note    Anticoagulation Summary  As of 12/3/2024      INR goal:  2.0-3.0   TTR:  65.8% (5.9 y)   INR used for dosing:  No new INR was available at the time of this encounter.   Warfarin maintenance plan:  4 mg every Mon, Wed, Fri; 2 mg all other days   Weekly warfarin total:  20 mg   Plan last modified:  Petra Oliveira [facility] (10/21/2024)   Next INR check:  12/9/2024   Priority:  Maintenance   Target end date:  Indefinite    Indications    Paroxysmal atrial fibrillation [I48.0]                 Anticoagulation Episode Summary       INR check location:  --    Preferred lab:  --    Send INR reminders to:   [facility]  POOL    Comments:  [facility] beginning 4/22/20          Anticoagulation Care Providers       Provider Role Specialty Phone number    Jonah Regalado Jr., MD Referring Cardiology [phone]            INR History:      11/15/2024    12:00 AM 11/15/2024     9:44 AM 11/26/2024    12:00 AM 11/26/2024     3:24 PM 12/2/2024    12:00 AM 12/2/2024    10:06 AM 12/3/2024    10:56 AM   Anticoagulation Monitoring   INR  2.50  2.70  4.00 --   INR Date  11/15/2024  11/26/2024  12/2/2024    INR Goal  2.0-3.0  2.0-3.0  2.0-3.0 2.0-3.0   Trend  Same  Same  Same Same   Last Week Total  20 mg  20 mg  20 mg 20 mg   Next Week Total  20 mg  20 mg  16 mg 18 mg   Sun  2 mg  2 mg  2 mg 2 mg   Mon  4 mg  -  Hold (12/2); Otherwise 4 mg -   Tue  2 mg  2 mg  2 mg Hold (12/3)   Wed  4 mg  4 mg  4 mg 4 mg   Thu  2 mg  2 mg  2 mg 2 mg   Fri  4 mg  4 mg  4 mg 4 mg   Sat  2 mg  2 mg  2 mg 2 mg   Historical INR 2.50      2.70      4.00             This result is from an external source.       Plan:  1. INR is  not available  today- see above in Anticoagulation Summary.   Provided instructions to Glenis with [facility] to Change their warfarin regimen- see above in Anticoagulation Summary.  Patient did not hold warfarin last night as instructed. Skip warfarin today, rck 1 week       2.  Follow up in 1 week      Petra Oliveira RPH

## 2024-12-06 ENCOUNTER — ANTICOAGULATION VISIT (OUTPATIENT)
Dept: PHARMACY | Facility: HOSPITAL | Age: 89
End: 2024-12-06
Payer: MEDICARE

## 2024-12-06 DIAGNOSIS — I48.0 PAROXYSMAL ATRIAL FIBRILLATION: Primary | ICD-10-CM

## 2024-12-06 LAB — INR PPP: 3.3

## 2024-12-06 NOTE — PROGRESS NOTES
Anticoagulation Clinic Progress Note    Anticoagulation Summary  As of 12/6/2024      INR goal:  2.0-3.0   TTR:  65.6% (5.9 y)   INR used for dosing:  3.30 (12/6/2024)   Warfarin maintenance plan:  4 mg every Mon, Wed, Fri; 2 mg all other days   Weekly warfarin total:  20 mg   Plan last modified:  Petra Oliveira RPH (10/21/2024)   Next INR check:  12/13/2024   Priority:  Maintenance   Target end date:  Indefinite    Indications    Paroxysmal atrial fibrillation [I48.0]                 Anticoagulation Episode Summary       INR check location:  --    Preferred lab:  --    Send INR reminders to:   [facility]  POOL    Comments:  [facility] beginning 4/22/20          Anticoagulation Care Providers       Provider Role Specialty Phone number    Jonah Regalado Jr., MD Referring Cardiology [phone]            INR History:      11/26/2024    12:00 AM 11/26/2024     3:24 PM 12/2/2024    12:00 AM 12/2/2024    10:06 AM 12/3/2024    10:56 AM 12/6/2024    12:00 AM 12/6/2024    10:11 AM   Anticoagulation Monitoring   INR  2.70  4.00 --  3.30   INR Date  11/26/2024 12/2/2024 12/6/2024   INR Goal  2.0-3.0  2.0-3.0 2.0-3.0  2.0-3.0   Trend  Same  Same Same  Same   Last Week Total  20 mg  20 mg 20 mg  18 mg   Next Week Total  20 mg  16 mg 18 mg  18 mg   Sun  2 mg  2 mg 2 mg  2 mg   Mon  -  Hold (12/2); Otherwise 4 mg -  4 mg   Tue  2 mg  2 mg Hold (12/3)  2 mg   Wed  4 mg  4 mg 4 mg  4 mg   Thu  2 mg  2 mg 2 mg  2 mg   Fri  4 mg  4 mg 4 mg  2 mg (12/6)   Sat  2 mg  2 mg 2 mg  2 mg   Historical INR 2.70      4.00       3.30            This result is from an external source.       Plan:  1. INR is Supratherapeutic today- see above in Anticoagulation Summary.   Provided instructions to Glenis with [facility] to Change their warfarin regimen- see above in Anticoagulation Summary.  Partial to 2 mg then resume 4 MWF, 2 mg AOD, rck 1 week   2. Follow up in 1 week      Petra Oliveira RPH

## 2024-12-09 ENCOUNTER — OFFICE VISIT (OUTPATIENT)
Dept: WOUND CARE | Facility: HOSPITAL | Age: 89
End: 2024-12-09
Payer: MEDICARE

## 2024-12-13 ENCOUNTER — ANTICOAGULATION VISIT (OUTPATIENT)
Dept: PHARMACY | Facility: HOSPITAL | Age: 89
End: 2024-12-13
Payer: MEDICARE

## 2024-12-13 DIAGNOSIS — I48.0 PAROXYSMAL ATRIAL FIBRILLATION: Primary | ICD-10-CM

## 2024-12-13 LAB — INR PPP: 2.3

## 2024-12-13 NOTE — PROGRESS NOTES
Anticoagulation Clinic Progress Note    Anticoagulation Summary  As of 2024      INR goal:  2.0-3.0   TTR:  65.7% (5.9 y)   INR used for dosin.30 (2024)   Warfarin maintenance plan:  4 mg every Mon, Wed, Fri; 2 mg all other days   Weekly warfarin total:  20 mg   No change documented:  Petra Oliveira RPH   Plan last modified:  Petra Oliveira RPH (10/21/2024)   Next INR check:  2024   Priority:  Maintenance   Target end date:  Indefinite    Indications    Paroxysmal atrial fibrillation [I48.0]                 Anticoagulation Episode Summary       INR check location:  --    Preferred lab:  --    Send INR reminders to:   [facility]  POOL    Comments:  [facility] lab beginning 20          Anticoagulation Care Providers       Provider Role Specialty Phone number    Jonah Regalado Jr., MD Referring Cardiology [phone]            INR History:      2024    12:00 AM 2024    10:06 AM 12/3/2024    10:56 AM 2024    12:00 AM 2024    10:11 AM 2024    12:00 AM 2024    10:30 AM   Anticoagulation Monitoring   INR  4.00 --  3.30  2.30   INR Date  2024   INR Goal  2.0-3.0 2.0-3.0  2.0-3.0  2.0-3.0   Trend  Same Same  Same  Same   Last Week Total  20 mg 20 mg  18 mg  18 mg   Next Week Total  16 mg 18 mg  18 mg  20 mg   Sun  2 mg 2 mg  2 mg  2 mg   Mon  Hold (); Otherwise 4 mg -  4 mg  4 mg   Tue  2 mg Hold (12/3)  2 mg  2 mg   Wed  4 mg 4 mg  4 mg  4 mg   Thu  2 mg 2 mg  2 mg  2 mg   Fri  4 mg 4 mg  2 mg ()  4 mg   Sat  2 mg 2 mg  2 mg  2 mg   Historical INR 4.00       3.30      2.30            This result is from an external source.       Plan:  1. INR is Therapeutic today- see above in Anticoagulation Summary.   Provided instructions to Glenis with [facility] to Continue their warfarin regimen- see above in Anticoagulation Summary.  Doxycyline completed, resume 4 mg MWF, 2 mg AOD, rck 1 week (discharge on  12/20)  2. Follow up in 1 week      Petra Oliveira RPH

## 2024-12-18 ENCOUNTER — ANTICOAGULATION VISIT (OUTPATIENT)
Dept: PHARMACY | Facility: HOSPITAL | Age: 89
End: 2024-12-18
Payer: MEDICARE

## 2024-12-18 DIAGNOSIS — I48.0 PAROXYSMAL ATRIAL FIBRILLATION: Primary | ICD-10-CM

## 2024-12-18 LAB — INR PPP: 2.2

## 2024-12-18 NOTE — PROGRESS NOTES
Copied from CRM #[id number]. Topic: MW Messaging - MW Patient Request  >> Dec 17, 2024  7:17 PM Aliyah LOPEZ wrote:  Rex Ndiaye called requesting to send a general message to clinician.   Verified issue is NOT regarding a symptom(s) requiring routine or emergent triage. Verified another message template type and CRM does not apply.    Selected 'Wrap Up CRM' and created new Telephone Encounter after clicking 'Convert to Clinical Call'. Selected appropriate Reason for Call.  Sent Pt message template and routed as routine priority per Clinician KB page to appropriate clinician pool. Readback full message.    -- DO NOT REPLY / DO NOT REPLY ALL --  -- This inbox is not monitored. If this was sent to the wrong provider or department, reroute message to P ECO Reroute pool. --  -- Message is from Engagement Center Operations (ECO) --    General Patient Message: pt is calling in will like to see if provider can come see him in [facility] in main building thanks in advance   Caller Information       Contact Date/Time Type Contact Phone/Fax    12/17/2024 08:58 AM CST Phone (Incoming) Rex Ndiaye - [phone]    12/17/2024 07:16 PM CST Phone (Incoming) Rex Ndiaye [phone]            Alternative phone number: n.a     Can a detailed message be left? Yes - LiveWell Message  and Yes - Voicemail   Patient has been advised the message will be addressed within 2-3 business days.                 PT NOTIFIED OF MRI RESULTS. PT VOICED UNDERSTANDING [phone] wkt

## 2024-12-18 NOTE — PROGRESS NOTES
Anticoagulation Clinic Progress Note    Anticoagulation Summary  As of 2024      INR goal:  2.0-3.0   TTR:  65.7% (5.9 y)   INR used for dosin.20 (2024)   Warfarin maintenance plan:  4 mg every Mon, Wed, Fri; 2 mg all other days   Weekly warfarin total:  20 mg   No change documented:  Alexander Valiente, PharmD   Plan last modified:  Petra Oliveira RPH (10/21/2024)   Next INR check:  2025   Priority:  Maintenance   Target end date:  Indefinite    Indications    Paroxysmal atrial fibrillation [I48.0]                 Anticoagulation Episode Summary       INR check location:  --    Preferred lab:  --    Send INR reminders to:   [facility]  POOL    Comments:  [facility] lab beginning 20          Anticoagulation Care Providers       Provider Role Specialty Phone number    Jonah Regalado Jr., MD Referring Cardiology [phone]            Clinic Interview:  Patient Findings     Positives:  Other complaints    Negatives:  Signs/symptoms of thrombosis, Signs/symptoms of bleeding,   Laboratory test error suspected, Change in health, Change in alcohol use,   Change in activity, Upcoming invasive procedure, Emergency department   visit, Upcoming dental procedure, Missed doses, Extra doses, Change in   medications, Change in diet/appetite, Hospital admission, Bruising    Comments:  Discharged from [facility] today.      Clinical Outcomes     Negatives:  Major bleeding event, Thromboembolic event,   Anticoagulation-related hospital admission, Anticoagulation-related ED   visit, Anticoagulation-related fatality    Comments:  Discharged from [facility] today.        INR History:      12/3/2024    10:56 AM 2024    12:00 AM 2024    10:11 AM 2024    12:00 AM 2024    10:30 AM 2024    12:00 AM 2024    10:30 AM   Anticoagulation Monitoring   INR --  3.30  2.30  2.20   INR Date   2024   INR Goal 2.0-3.0  2.0-3.0  2.0-3.0  2.0-3.0   Trend Same  Same   Same  Same   Last Week Total 20 mg  18 mg  18 mg  20 mg   Next Week Total 18 mg  18 mg  20 mg  20 mg   Sun 2 mg  2 mg  2 mg  2 mg   Mon -  4 mg  4 mg  4 mg   Tue Hold (12/3)  2 mg  2 mg  2 mg   Wed 4 mg  4 mg  4 mg  4 mg   Thu 2 mg  2 mg  2 mg  2 mg   Fri 4 mg  2 mg (12/6)  4 mg  4 mg   Sat 2 mg  2 mg  2 mg  2 mg   Historical INR  3.30      2.30      2.20            This result is from an external source.       Plan:  1. INR is Therapeutic today- see above in Anticoagulation Summary.   Will instruct Caitlyn Longoria to Continue their warfarin regimen- see above in Anticoagulation Summary.  2. Follow up in 2 weeks with INR at [facility].  3. They have been instructed to call if any changes in medications, doses, concerns, etc. Patient expresses understanding and has no further questions at this time.    Alexander Valiente, PharmD

## 2024-12-26 ENCOUNTER — OFFICE VISIT (OUTPATIENT)
Dept: ONCOLOGY | Facility: CLINIC | Age: 89
End: 2024-12-26
Payer: MEDICARE

## 2024-12-26 ENCOUNTER — LAB (OUTPATIENT)
Dept: LAB | Facility: HOSPITAL | Age: 89
End: 2024-12-26
Payer: MEDICARE

## 2024-12-26 ENCOUNTER — INFUSION (OUTPATIENT)
Dept: ONCOLOGY | Facility: HOSPITAL | Age: 89
End: 2024-12-26
Payer: MEDICARE

## 2024-12-26 VITALS — DIASTOLIC BLOOD PRESSURE: 66 MMHG | SYSTOLIC BLOOD PRESSURE: 107 MMHG | HEART RATE: 79 BPM

## 2024-12-26 VITALS
WEIGHT: 186.2 LBS | SYSTOLIC BLOOD PRESSURE: 99 MMHG | HEART RATE: 91 BPM | TEMPERATURE: 97.5 F | DIASTOLIC BLOOD PRESSURE: 65 MMHG | OXYGEN SATURATION: 93 % | HEIGHT: 63 IN | RESPIRATION RATE: 16 BRPM | BODY MASS INDEX: 32.99 KG/M2

## 2024-12-26 DIAGNOSIS — C91.10 CLL (CHRONIC LYMPHOCYTIC LEUKEMIA): ICD-10-CM

## 2024-12-26 DIAGNOSIS — C91.10 CLL (CHRONIC LYMPHOCYTIC LEUKEMIA): Primary | ICD-10-CM

## 2024-12-26 DIAGNOSIS — C91.12 CLL (CHRONIC LYMPHOID LEUKEMIA) IN RELAPSE: Primary | ICD-10-CM

## 2024-12-26 DIAGNOSIS — Z79.01 CURRENT USE OF LONG TERM ANTICOAGULATION: ICD-10-CM

## 2024-12-26 LAB
ALBUMIN SERPL-MCNC: 3.4 G/DL (ref 3.5–5.2)
ALBUMIN/GLOB SERPL: 1.4 G/DL
ALP SERPL-CCNC: 93 U/L (ref 39–117)
ALT SERPL W P-5'-P-CCNC: 22 U/L (ref 1–33)
ANION GAP SERPL CALCULATED.3IONS-SCNC: 9.7 MMOL/L (ref 5–15)
AST SERPL-CCNC: 33 U/L (ref 1–32)
BASOPHILS # BLD AUTO: 0.01 10*3/MM3 (ref 0–0.2)
BASOPHILS NFR BLD AUTO: 0.1 % (ref 0–1.5)
BILIRUB SERPL-MCNC: 0.6 MG/DL (ref 0–1.2)
BUN SERPL-MCNC: 20 MG/DL (ref 8–23)
BUN/CREAT SERPL: 22.2 (ref 7–25)
CALCIUM SPEC-SCNC: 8.3 MG/DL (ref 8.2–9.6)
CHLORIDE SERPL-SCNC: 107 MMOL/L (ref 98–107)
CO2 SERPL-SCNC: 24.3 MMOL/L (ref 22–29)
CREAT SERPL-MCNC: 0.9 MG/DL (ref 0.57–1)
DEPRECATED RDW RBC AUTO: 56.3 FL (ref 37–54)
EGFRCR SERPLBLD CKD-EPI 2021: 60.9 ML/MIN/1.73
EOSINOPHIL # BLD AUTO: 0 10*3/MM3 (ref 0–0.4)
EOSINOPHIL NFR BLD AUTO: 0 % (ref 0.3–6.2)
ERYTHROCYTE [DISTWIDTH] IN BLOOD BY AUTOMATED COUNT: 15 % (ref 12.3–15.4)
GLOBULIN UR ELPH-MCNC: 2.4 GM/DL
GLUCOSE SERPL-MCNC: 136 MG/DL (ref 65–99)
HCT VFR BLD AUTO: 38.3 % (ref 34–46.6)
HGB BLD-MCNC: 11.8 G/DL (ref 12–15.9)
IGA1 MFR SER: <50 MG/DL (ref 70–400)
IGG1 SER-MCNC: 854 MG/DL (ref 700–1600)
IGM SERPL-MCNC: 26 MG/DL (ref 40–230)
IMM GRANULOCYTES # BLD AUTO: 0.04 10*3/MM3 (ref 0–0.05)
IMM GRANULOCYTES NFR BLD AUTO: 0.4 % (ref 0–0.5)
LYMPHOCYTES # BLD AUTO: 6.09 10*3/MM3 (ref 0.7–3.1)
LYMPHOCYTES NFR BLD AUTO: 66 % (ref 19.6–45.3)
MCH RBC QN AUTO: 31.6 PG (ref 26.6–33)
MCHC RBC AUTO-ENTMCNC: 30.8 G/DL (ref 31.5–35.7)
MCV RBC AUTO: 102.4 FL (ref 79–97)
MONOCYTES # BLD AUTO: 0.23 10*3/MM3 (ref 0.1–0.9)
MONOCYTES NFR BLD AUTO: 2.5 % (ref 5–12)
NEUTROPHILS NFR BLD AUTO: 2.86 10*3/MM3 (ref 1.7–7)
NEUTROPHILS NFR BLD AUTO: 31 % (ref 42.7–76)
NRBC BLD AUTO-RTO: 0 /100 WBC (ref 0–0.2)
PLATELET # BLD AUTO: 156 10*3/MM3 (ref 140–450)
PMV BLD AUTO: 9.7 FL (ref 6–12)
POTASSIUM SERPL-SCNC: 3.5 MMOL/L (ref 3.5–5.2)
PROT SERPL-MCNC: 5.8 G/DL (ref 6–8.5)
RBC # BLD AUTO: 3.74 10*6/MM3 (ref 3.77–5.28)
SODIUM SERPL-SCNC: 141 MMOL/L (ref 136–145)
WBC NRBC COR # BLD AUTO: 9.23 10*3/MM3 (ref 3.4–10.8)

## 2024-12-26 PROCEDURE — 96366 THER/PROPH/DIAG IV INF ADDON: CPT

## 2024-12-26 PROCEDURE — A9270 NON-COVERED ITEM OR SERVICE: HCPCS | Performed by: INTERNAL MEDICINE

## 2024-12-26 PROCEDURE — 80053 COMPREHEN METABOLIC PANEL: CPT

## 2024-12-26 PROCEDURE — 63710000001 HYDROXYZINE PAMOATE PER 25 MG: Performed by: INTERNAL MEDICINE

## 2024-12-26 PROCEDURE — 63710000001 ACETAMINOPHEN 325 MG TABLET: Performed by: INTERNAL MEDICINE

## 2024-12-26 PROCEDURE — 82784 ASSAY IGA/IGD/IGG/IGM EACH: CPT | Performed by: INTERNAL MEDICINE

## 2024-12-26 PROCEDURE — 25010000002 IMMUNE GLOBULIN (HUMAN) 30 GM/300ML SOLUTION: Performed by: INTERNAL MEDICINE

## 2024-12-26 PROCEDURE — 96365 THER/PROPH/DIAG IV INF INIT: CPT

## 2024-12-26 PROCEDURE — 85025 COMPLETE CBC W/AUTO DIFF WBC: CPT

## 2024-12-26 PROCEDURE — 36415 COLL VENOUS BLD VENIPUNCTURE: CPT

## 2024-12-26 RX ORDER — ACETAMINOPHEN 325 MG/1
650 TABLET ORAL ONCE
Status: COMPLETED | OUTPATIENT
Start: 2024-12-26 | End: 2024-12-26

## 2024-12-26 RX ORDER — MEPERIDINE HYDROCHLORIDE 50 MG/ML
25 INJECTION INTRAMUSCULAR; INTRAVENOUS; SUBCUTANEOUS
Status: CANCELLED | OUTPATIENT
Start: 2024-12-26 | End: 2024-12-27

## 2024-12-26 RX ORDER — ACETAMINOPHEN 325 MG/1
650 TABLET ORAL ONCE
Status: CANCELLED | OUTPATIENT
Start: 2024-12-26

## 2024-12-26 RX ORDER — FAMOTIDINE 10 MG/ML
20 INJECTION, SOLUTION INTRAVENOUS AS NEEDED
Status: CANCELLED | OUTPATIENT
Start: 2024-12-26

## 2024-12-26 RX ORDER — DIPHENHYDRAMINE HYDROCHLORIDE 50 MG/ML
50 INJECTION INTRAMUSCULAR; INTRAVENOUS AS NEEDED
Status: CANCELLED | OUTPATIENT
Start: 2024-12-26

## 2024-12-26 RX ORDER — HYDROXYZINE PAMOATE 25 MG/1
25 CAPSULE ORAL ONCE
Status: CANCELLED | OUTPATIENT
Start: 2024-12-26

## 2024-12-26 RX ORDER — HYDROXYZINE PAMOATE 25 MG/1
25 CAPSULE ORAL ONCE
Status: COMPLETED | OUTPATIENT
Start: 2024-12-26 | End: 2024-12-26

## 2024-12-26 RX ORDER — SODIUM CHLORIDE 9 MG/ML
250 INJECTION, SOLUTION INTRAVENOUS ONCE
Status: CANCELLED | OUTPATIENT
Start: 2024-12-26

## 2024-12-26 RX ADMIN — HYDROXYZINE PAMOATE 25 MG: 25 CAPSULE ORAL at 09:39

## 2024-12-26 RX ADMIN — IMMUNE GLOBULIN INFUSION (HUMAN) 30 G: 100 INJECTION, SOLUTION INTRAVENOUS; SUBCUTANEOUS at 10:12

## 2024-12-26 RX ADMIN — ACETAMINOPHEN 650 MG: 325 TABLET ORAL at 09:39

## 2024-12-26 NOTE — PROGRESS NOTES
Subjective .     REASONS FOR FOLLOW UP:  1. chronic lymphocytic leukemia with recurrent lung infections  2.  Hypogammaglobulinemia    Referring MD:    Amarilis Suarez MD    History of Present Illness patient is an.age female with 2014 with stage 0 CLL which has never required treatments.    In 2019 she  had multiple hospitalizations for lung infections predominantly viral probably also bacterial and at her last hospitalization in October we rechecked her gammaglobulins which were low and opted to start IV IgG monthly to see if this would keep her out of the hospital. She has continued on monthly IVIG since from October through March .  The patient has not had any recurrent infections/hospitalizations since initiating IVIG.    As she is reviewed back today, she is doing well has had no infections in the last year   she is hoping that resuming her IV IgG for the fall will help with the infections   we reviewed her CBC showing no upward trend in her WBC and absolute lymphocyte count. Denies any B symptoms. No change in appetite or weight loss, no night sweats or fever.  Denies any new palpable adenopathy she is feeling about the same, noting that she does not sleep well but this is a longstanding chronic issue.  Her leg edema has improved with diuretics    Denies other concerns at this time.    She does want to continue treatment as long as her quality of life is good    Past Medical History:   Diagnosis Date    Aortic calcification     mild, on echo 12/17/2017    Aortic regurgitation     Trace    Asthma     Atrial fibrillation     CAD (coronary artery disease)     Carpal tunnel syndrome of left wrist     Chronic combined systolic and diastolic congestive heart failure     CKD (chronic kidney disease) stage 3, GFR 30-59 ml/min     COPD (chronic obstructive pulmonary disease)     Coronary artery disease involving native coronary artery of native heart with angina pectoris     Disc degeneration, lumbar     Diverticulosis      DM type 2 (diabetes mellitus, type 2)     GERD (gastroesophageal reflux disease)     History of aneurysm     right femoral artery s/p LHC    History of blood transfusion     History of fracture     History of heart attack     History of vitamin D deficiency     Hyperlipidemia     Hypertension     Leukemia     Mild mitral regurgitation     Mitral annular calcification     12/8/2017- echo, moderate    Osteopenia     PAF (paroxysmal atrial fibrillation)     Peripheral neuropathy     Skin cancer     Left hand    Sleep apnea     bipap    SSS (sick sinus syndrome)     Stroke (cerebrum)     TIA (transient ischemic attack) 2017    Tricuspid regurgitation     Trace       ONCOLOGIC HISTORY:    [facility] HISTORY  Patient is an 85-year-old female whom I had seen in 2014 when she was hospitalized at [facility] and was diagnosed with chronic lymphocytic leukemia.  She has not been to see me in over 4 years and is following with Dr. Suarez her primary care doctor and her white count is gone up a little higher than usual to 22,000 and she is referred back to us for evaluation.  We are doing a telephone visit because of the coronavirus pandemic and her age and susceptibility.    When I originally saw her in 2014 she was brought into the ER unresponsive in septic shock on 03/25/2014 with methicillin-resistant staphylococcus identified in her blood cultures. The patient has been on antibiotic with improvement, but had some residual kidney damage with a creatinine in the 4-range requiring hemodialysis, and was noted on admission to have an elevated white count with lymphocytosis, normal hemoglobin, but a platelet count of 95,000, and over the course of the illness her platelet count normalized and the white count had gone up into the 20,000 range with lymphocytosis. A flow cytometry was sent which is consistent with CLL, she came to our office once or twice and then stopped coming because she was so stable     She tells me now she was  hospitalized recently with rhinovirus infection and has had diagnosed with asthma and is having a lot of respiratory issues but does not require oxygen.She is at the [facility] home in independent living and managing fairly well    She denies fever sweats or weight loss.  Her last white count was on 5/26/2020  Showed a white count of 18,000 with 66 lymphs and 27 neutrophils platelet white hemoglobin is 12.6 platelet 188,000    I reassured Christopher that no treatment is indicated for this level of leukocytosis from CLL and if she has no systemic complaints I do not feel strongly about doing CAT scans at her age but I would like to physically examine her in the office in a couple of months to make sure there is no obvious adenopathy or hepatosplenomegaly.    She does have recurrent infections and we can check quantitative immunoglobulins to see how low they are as another adjunctive option to prevent recurrent infections if she has hypogammaglobulinemia    She remains on Coumadin for atrial fibrillation      Current Outpatient Medications on File Prior to Visit   Medication Sig Dispense Refill    acetaminophen (TYLENOL) 325 MG tablet Take 2 tablets by mouth Every 4 (Four) Hours As Needed for Mild Pain .      Acetylcysteine (NAC PO) Take 1,000 mg by mouth 2 (Two) Times a Day.      albuterol (PROVENTIL) (2.5 MG/3ML) 0.083% nebulizer solution Take 2.5 mg by nebulization Every 4 (Four) Hours.      Benralizumab (FASENRA SC) Inject 1 dose under the skin into the appropriate area as directed Every 2 (Two) Months. Every 8 weeks      budesonide (PULMICORT) 0.5 MG/2ML nebulizer solution Take 2 mL by nebulization 2 (Two) Times a Day.      Calcium Carbonate-Vitamin D (calcium 500 mg vitamin D 5 mcg, 200 UT,) 500-5 MG-MCG tablet per tablet Take 1 tablet by mouth 2 (Two) Times a Day. 60 tablet 3    Cetirizine HCl 10 MG capsule Take 1 capsule by mouth Daily.      fluticasone (FLONASE) 50 MCG/ACT nasal spray Administer 1 spray into the  nostril(s) as directed by provider Daily.      furosemide (LASIX) 20 MG tablet Take 1 tablet by mouth As Needed (For weight gain of greater than 2 pounds in 1 day or 5 pounds in 1 week). (Patient taking differently: Take 1 tablet by mouth Daily.) 30 tablet 11    glipizide (GLUCOTROL) 5 MG tablet Take 1 tablet by mouth Daily With Breakfast.      loperamide (IMODIUM) 2 MG capsule Take 1 capsule by mouth 4 (Four) Times a Day As Needed for Diarrhea.      melatonin 5 MG tablet tablet Take 1 tablet by mouth Every Night.      metFORMIN ER (GLUCOPHAGE-XR) 500 MG 24 hr tablet Take 1 tablet by mouth Daily With Breakfast.      montelukast (SINGULAIR) 10 MG tablet Take 1 tablet by mouth Every Night.      oxybutynin XL (DITROPAN-XL) 10 MG 24 hr tablet Take 1 tablet by mouth 2 (Two) Times a Day.      rosuvastatin (CRESTOR) 20 MG tablet Take 1 tablet by mouth Every Night.      warfarin (COUMADIN) 4 MG tablet TAKE ONE-HALF (1/2) TABLET ON SUNDAY AND FRIDAY AND TAKE ONE TABLET ALL OTHER DAYS OR AS DIRECTED (Patient taking differently: Take  by mouth See Admin Instructions. TAKE ONE-HALF (1/2) TABLET (2 MG) ON TUESDAYS AND THURSDAYS AND TAKE ONE TABLET (4 MG) ALL OTHER DAYS OR AS DIRECTED) 80 tablet 1    metoprolol tartrate (LOPRESSOR) 25 MG tablet Take 0.5 tablets by mouth Every 12 (Twelve) Hours for 30 days. 30 tablet 0     No current facility-administered medications on file prior to visit.       ALLERGIES:     Allergies   Allergen Reactions    Accupril [Quinapril Hcl] Swelling, Other (See Comments), GI Intolerance and Delirium     HA, constipation     Ahist [Chlorpheniramine] Nausea Only, Other (See Comments) and Dizziness     Headache, Blurred vision    Clarithromycin Nausea Only, Other (See Comments) and Mental Status Change     HA, Depression, Flushing    Esomeprazole GI Intolerance    Latex Other (See Comments)     Skin breakdown    Levalbuterol Swelling    Levocetirizine Diarrhea and GI Intolerance    Lipitor [Atorvastatin]  Other (See Comments) and Myalgia     Dark urine    Omeprazole Nausea Only and Other (See Comments)     HA    Pravachol [Pravastatin] Nausea Only and GI Intolerance     Bloated, Constipation, HA    Sulindac Other (See Comments) and Myalgia     HA, joint pain, bruising    Valdecoxib Irritability    Chlorcyclizine Unknown - Low Severity    Diclofenac Sodium Unknown - Low Severity    Diphenhydramine Unknown - Low Severity    Lodine [Etodolac] Unknown - Low Severity    Sulfa Antibiotics Unknown - Low Severity       Social History     Socioeconomic History    Marital status: Single   Tobacco Use    Smoking status: Never     Passive exposure: Never    Smokeless tobacco: Never    Tobacco comments:     caffeine use- soda   Vaping Use    Vaping status: Never Used   Substance and Sexual Activity    Alcohol use: Never    Drug use: No    Sexual activity: Defer         Cancer-related family history includes Colon cancer in her sister.     I have reviewed the patient's medical history in detail and updated the computerized patient record.    Review of Systems  I have reviewed and confirmed the accuracy of the ROS as documented by the MA/JUSTINN/RN Lucien Larkin MD      Objective      Vitals:    12/26/24 0907   BP: 99/65   Pulse: 91   Resp: 16   Temp: 97.5 °F (36.4 °C)   SpO2: 93%             12/26/2024     9:04 AM   Current Status   ECOG score 0     Pain Score    12/26/24 0907   PainSc: 0-No pain           CONSTITUTIONAL:  Vital signs reviewed.  No distress, looks comfortable.  EYES:  Conjunctiva and lids unremarkable.  PERRLA  EARS,NOSE,MOUTH,THROAT: Hearing intact.  Lips, teeth, gums appear unremarkable.  RESPIRATORY:  Normal respiratory effort.  Lungs clear to auscultation on the right decreased breath breath sounds lower half of the left lung due to paralyzed diaphragm  CARDIOVASCULAR:  Normal S1, S2.  No murmurs rubs or gallops.  1+ lower extremity edema.  With the wound which is bandaged on the left  GASTROINTESTINAL:  Abdomen unremarkable.  Nontender. No hepatosplenomegaly.   LYMPHATIC:  No cervical, supraclavicular, inguinal lymphadenopathy.  SKIN:  Warm.  No rashes.  Numerous ecchymosis on her arms and legs  PSYCHIATRIC:  Normal judgment and insight.  Normal mood and affect.     I have reexamined the patient 12/26/2024 and the results are consistent with the previously documented exam except as updated. Lucien Larkin MD       RECENT LABS:  Results from last 7 days   Lab Units 12/26/24  0841   WBC 10*3/mm3 9.23   NEUTROS ABS 10*3/mm3 2.86   HEMOGLOBIN g/dL 11.8*   HEMATOCRIT % 38.3   PLATELETS 10*3/mm3 156       Results from last 7 days   Lab Units 12/26/24  0841   SODIUM mmol/L 141   POTASSIUM mmol/L 3.5   CHLORIDE mmol/L 107   CO2 mmol/L 24.3   BUN mg/dL 20   CREATININE mg/dL 0.90   CALCIUM mg/dL 8.3   ALBUMIN g/dL 3.4*   BILIRUBIN mg/dL 0.6   ALK PHOS U/L 93   ALT (SGPT) U/L 22   AST (SGOT) U/L 33*   GLUCOSE mg/dL 136*         Assessment & Plan    1. Chronic lymphocytic leukemia  -stage 0 since 2014 untreated  White count increased to 32,000 in 12/22 after prednisone pack.    1/11/2024 reviewed with patient slight trend and WBC up to 18.25, ALC up to 14%.  She has no other concerning findings clinically and we discussed continued observation for now.  White count increased to 45,000 in 10/24 on prednisone 40 mg  WBC down to 9000 in 12/24    2.  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  The patient received IVIG treatments October 2020 through March 2021.  We have held IVIG since that time and the patient is happy to report no infections aside from some urinary tract infection which she has chronically.  Otherwise she has been feeling very well.  Resumes monthly IVIG from October through March on a yearly basis  Resuming IV IgG from October 2024 to March 2025    3.  COPD/asthma /emphysema on home O2-recurrent respiratory infections with RSV and rhinovirus  and bacteria    4.  Atrial fibrillation on Coumadin.  Stable with no bleeding.    5.  Hypertension/hypercholesterolemia/type 2 diabetes on treatment    6.  Vaccinated against coronavirus        Plan:  Proceed with 30g IVIG for hypogammaglobulinemia.  This is being given monthly October through March Return in 1 /2month for IVIG 30 g   Return in 3 months for follow-up with Dr. Larkin Keep an eye on WBC/ALC in setting of CLL.  Discussed course of continued observation for now.      This patient is on high risk drug therapy requiring intensive monitoring for toxicity.    Patient is receiving IVIG, tolerating it without difficulty and continuing to receive benefit.  Continue IVIG at current dosage and schedule.      Lucien Larkin MD  12/26/2024

## 2024-12-27 NOTE — NURSING NOTE
Late note for 12/26/24:  Patient assisted to BR accomp.per this RN. When in BR, patient turned self to untangle IV tubing,lost balance, and fell back against paper towel pappas on wall. She hit her left flank. Denied injury and none observed by this RN. Ice applied to site.

## 2025-01-06 ENCOUNTER — APPOINTMENT (OUTPATIENT)
Dept: CT IMAGING | Facility: HOSPITAL | Age: OVER 89
End: 2025-01-06
Payer: COMMERCIAL

## 2025-01-06 ENCOUNTER — APPOINTMENT (OUTPATIENT)
Dept: GENERAL RADIOLOGY | Facility: HOSPITAL | Age: OVER 89
End: 2025-01-06
Payer: COMMERCIAL

## 2025-01-06 ENCOUNTER — HOSPITAL ENCOUNTER (EMERGENCY)
Facility: HOSPITAL | Age: OVER 89
Discharge: NURSING, REHAB OR LONG-TERM CARE | End: 2025-01-06
Attending: EMERGENCY MEDICINE | Admitting: EMERGENCY MEDICINE
Payer: MEDICARE

## 2025-01-06 VITALS
BODY MASS INDEX: 33.01 KG/M2 | WEIGHT: 186.29 LBS | DIASTOLIC BLOOD PRESSURE: 94 MMHG | HEIGHT: 63 IN | SYSTOLIC BLOOD PRESSURE: 141 MMHG | TEMPERATURE: 98.5 F | OXYGEN SATURATION: 94 % | RESPIRATION RATE: 16 BRPM | HEART RATE: 82 BPM

## 2025-01-06 DIAGNOSIS — S20.212A CONTUSION OF LEFT CHEST WALL, INITIAL ENCOUNTER: ICD-10-CM

## 2025-01-06 DIAGNOSIS — S00.03XA CONTUSION OF SCALP, INITIAL ENCOUNTER: Primary | ICD-10-CM

## 2025-01-06 LAB
INR PPP: 2.26 (ref 0.9–1.1)
PROTHROMBIN TIME: 25.2 SECONDS (ref 11.7–14.2)

## 2025-01-06 PROCEDURE — 73610 X-RAY EXAM OF ANKLE: CPT

## 2025-01-06 PROCEDURE — 85610 PROTHROMBIN TIME: CPT | Performed by: EMERGENCY MEDICINE

## 2025-01-06 PROCEDURE — 99284 EMERGENCY DEPT VISIT MOD MDM: CPT

## 2025-01-06 PROCEDURE — 71250 CT THORAX DX C-: CPT

## 2025-01-06 PROCEDURE — 70450 CT HEAD/BRAIN W/O DYE: CPT

## 2025-01-06 PROCEDURE — 36415 COLL VENOUS BLD VENIPUNCTURE: CPT

## 2025-01-06 NOTE — ED NOTES
Pt via [facility] from [place], independent living, with c/o head injury (on coumadin), L shoulder blade d/t mechanical fall. Denies any LOC, head or neck pain.

## 2025-01-06 NOTE — ED PROVIDER NOTES
EMERGENCY DEPARTMENT ENCOUNTER    Room Number:  19/19  PCP: Zenaida Suarez MD  Historian: Patient      HPI:  Chief Complaint: Fall  A complete HPI/ROS/PMH/PSH/SH/FH are unobtainable due to: None  Context: Caitlyn Longoria is a 89 y.o. female who presents to the ED c/o fall.  Patient states she was at her house.  Patient was using her rollator.  Patient bent down to  something and the rollator was not locked.  States she fell backwards and hit her head.  She also hit the left side of her chest.  Patient has no shortness of breath.  Patient is on warfarin.  Has had no fevers or chills.  Is had no hip pain.  States she has some mild left ankle pain.  Has had no vomiting or diarrhea.            PAST MEDICAL HISTORY  Active Ambulatory Problems     Diagnosis Date Noted    Neck and shoulder pain 03/24/2017    Arthropathy of shoulder region 03/24/2017    Carpal tunnel syndrome of left wrist 03/24/2017    Chronic pain of both shoulders 06/27/2017    Chronic left shoulder pain 12/05/2017    Arthropathy of left shoulder 12/05/2017    Dysarthria 12/07/2017    Type 2 diabetes mellitus 12/07/2017    Paroxysmal atrial fibrillation 12/07/2017    HLD (hyperlipidemia) 12/07/2017    Chronic bronchitis 12/07/2017    Coronary artery disease 12/07/2017    CVA (cerebral vascular accident) 12/10/2017    HTN (hypertension) 01/14/2018    CKD (chronic kidney disease), stage III 01/14/2018    Chronic combined systolic (congestive) and diastolic (congestive) heart failure 01/15/2018    Infection of prosthetic right knee joint 01/17/2018    Stasis dermatitis of right lower extremity due to peripheral venous hypertension 04/28/2018    Current use of long term anticoagulation 04/28/2018    Morbid obesity 02/08/2019    Acute respiratory failure with hypoxia 09/16/2019    Rhinovirus 02/11/2020    Asthma with acute exacerbation 02/11/2020    Acute respiratory failure with hypoxemia 02/12/2020    Viral pneumonia 04/23/2020     Hypoxia 08/29/2020    CLL (chronic lymphocytic leukemia) 08/31/2020    Dyspnea 09/29/2020    Anticoagulated on Coumadin     Osteoporotic compression fracture of spine 09/30/2020    Pneumonia due to gram-negative bacteria 10/02/2020    Pneumonia due to infectious organism 09/29/2020    Bilateral carotid artery disease 10/28/2020    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    Cellulitis of right lower extremity 04/14/2024    Hypokalemia 04/15/2024    Nocturnal hypoxia 04/15/2024    COPD (chronic obstructive pulmonary disease) 04/15/2024    Upper respiratory tract infection 08/25/2024    COPD (chronic obstructive pulmonary disease) 08/25/2024    Sick sinus syndrome 08/25/2024    Anemia 08/25/2024    Thrombocytopenia 08/25/2024    Chronic HFrEF (heart failure with reduced ejection fraction) 08/25/2024    Cellulitis of left lower leg 08/26/2024    Acute exacerbation of COPD with asthma 09/27/2024    Leukocytosis 09/27/2024    COPD exacerbation 09/27/2024     Resolved Ambulatory Problems     Diagnosis Date Noted    Hypernatremia 01/15/2018    Shortness of breath 04/28/2020    Shortness of breath 08/22/2024    Hypokalemia 08/25/2024    Hypomagnesemia 08/25/2024    Hypocalcemia 08/25/2024     Past Medical History:   Diagnosis Date    Aortic calcification     Aortic regurgitation     Asthma     Atrial fibrillation     CAD (coronary artery disease)     Chronic combined systolic and diastolic congestive heart failure     CKD (chronic kidney disease) stage 3, GFR 30-59 ml/min     Coronary artery disease involving native coronary artery of native heart with angina pectoris     Disc degeneration, lumbar     Diverticulosis     DM type 2 (diabetes mellitus, type 2)     GERD (gastroesophageal reflux disease)     History of aneurysm     History of blood transfusion     History of fracture     History of heart attack     History of vitamin D deficiency     Hyperlipidemia     Hypertension     Leukemia      Mild mitral regurgitation     Mitral annular calcification     Osteopenia     PAF (paroxysmal atrial fibrillation)     Peripheral neuropathy     Skin cancer     Sleep apnea     SSS (sick sinus syndrome)     Stroke (cerebrum)     TIA (transient ischemic attack) 2017    Tricuspid regurgitation          PAST SURGICAL HISTORY  Past Surgical History:   Procedure Laterality Date    BRONCHOSCOPY Bilateral 10/6/2020    Procedure: BRONCHOSCOPY with BILATERAL LUNG washings;  Surgeon: Juno Coburn MD;  Location: [facility] ENDOSCOPY;  Service: Pulmonary;  Laterality: Bilateral;  PRE: purulent bronchitis  POST: PURULENT BRONCHITIS    BRONCHOSCOPY Bilateral 10/9/2020    Procedure: BRONCHOSCOPY with washing;  Surgeon: Juno Coburn MD;  Location: [facility] ENDOSCOPY;  Service: Pulmonary;  Laterality: Bilateral;  pre/post - mucous plug      CARDIAC CATHETERIZATION      CARDIAC ELECTROPHYSIOLOGY PROCEDURE N/A 2/7/2020    Procedure: PPM generator change - dual  medtronic;  Surgeon: Kiel Field MD;  Location: [facility] CATH INVASIVE LOCATION;  Service: Cardiology;  Laterality: N/A;    CHOLECYSTECTOMY      CORONARY STENT PLACEMENT      ENDOSCOPY N/A 9/14/2022    Procedure: ESOPHAGOGASTRODUODENOSCOPY with 54fr main dilatation;  Surgeon: Enio Cota MD;  Location: [facility] ENDOSCOPY;  Service: Gastroenterology;  Laterality: N/A;  pre - dysphagia  post - s/p dilatation, watermelon stomach    HERNIA REPAIR      hital hernia    HYSTERECTOMY      PACEMAKER IMPLANTATION      REPLACEMENT TOTAL KNEE Bilateral          FAMILY HISTORY  Family History   Problem Relation Age of Onset    Heart disease Mother     Hypertension Mother     Colon polyps Mother     Heart disease Father     Hypertension Father     Stroke Father     Hypertension Brother     Heart disease Brother     Diabetes Niece     Colon cancer Sister     Hypertension Sister     Hypertension Daughter     Hypertension Son     Hypertension Maternal Aunt     Hypertension Maternal  Grandmother     Hypertension Maternal Grandfather     Hypertension Paternal Grandmother     Hypertension Paternal Grandfather          SOCIAL HISTORY  Social History     Socioeconomic History    Marital status: Single   Tobacco Use    Smoking status: Never     Passive exposure: Never    Smokeless tobacco: Never    Tobacco comments:     caffeine use- soda   Vaping Use    Vaping status: Never Used   Substance and Sexual Activity    Alcohol use: Never    Drug use: No    Sexual activity: Defer         ALLERGIES  Accupril [quinapril hcl], Ahist [chlorpheniramine], Clarithromycin, Esomeprazole, Latex, Levalbuterol, Levocetirizine, Lipitor [atorvastatin], Omeprazole, Pravachol [pravastatin], Sulindac, Valdecoxib, Chlorcyclizine, Chlorpheniramine-phenylephrine, Diclofenac sodium, Diphenhydramine, Lodine [etodolac], and Sulfa antibiotics        REVIEW OF SYSTEMS  Review of Systems   Head injury rib pain      PHYSICAL EXAM  ED Triage Vitals [01/06/25 1531]   Temp Heart Rate Resp BP SpO2   98.5 °F (36.9 °C) 86 16 108/50 95 %      Temp src Heart Rate Source Patient Position BP Location FiO2 (%)   Tympanic -- -- -- --       Physical Exam      GENERAL: no acute distress  HENT: nares patent  EYES: no scleral icterus  CV: regular rhythm, normal rate  RESPIRATORY: normal effort  ABDOMEN: soft  MUSCULOSKELETAL: no deformity.  Tenderness to left chest wall  NEURO: alert, moves all extremities, follows commands  PSYCH:  calm, cooperative  SKIN: warm, dry    Vital signs and nursing notes reviewed.          LAB RESULTS  Recent Results (from the past 24 hours)   Protime-INR    Collection Time: 01/06/25  3:52 PM    Specimen: Blood   Result Value Ref Range    Protime 25.2 (H) 11.7 - 14.2 Seconds    INR 2.26 (H) 0.90 - 1.10       Ordered the above labs and reviewed the results.        RADIOLOGY  CT Chest Without Contrast Diagnostic    Result Date: 1/6/2025  CT CHEST WO CONTRAST DIAGNOSTIC-  INDICATIONS: Trauma  TECHNIQUE: Radiation dose  reduction techniques were utilized, including automated exposure control and exposure modulation based on body size. Unenhanced CHEST CT  COMPARISON: 9/27/2024  FINDINGS:    The heart size is enlarged without pericardial effusion. Coronary arterial calcifications are conspicuous. The ascending aorta is dilated, 3.9 cm. Nonspecific mediastinal adenopathy appears little changed from 9/29/2020. Left-sided pacemaker and cardiac leads are seen. Thyroid nodularity is again suggested but suboptimally characterized, thyroid ultrasound correlation can be obtained as indicated.  The airways appear clear.  No pleural effusion or pneumothorax.  The lungs show no focal pulmonary consolidation or mass. Likely atelectasis and scarring in both lungs.  Upper abdominal structures showed no acute findings. Pancreas is thinned. Multiple cystic foci are are again seen in the spleen. Left hemidiaphragm remains elevated.  Degenerative changes are seen in the spine. The vertebral body heights appear stable, with chronic compression deformity of T5, T7, T10, T11. No acute fracture is identified.       No acute fracture is identified.  Other stable findings, as detailed above.  This report was finalized on 1/6/2025 5:36 PM by Dr. Mitul Hudson M.D on Workstation: [id number]      CT Head Without Contrast    Result Date: 1/6/2025  CT HEAD WO CONTRAST-  INDICATIONS: Trauma  TECHNIQUE: Radiation dose reduction techniques were utilized, including automated exposure control and exposure modulation based on body size. Noncontrast head CT  COMPARISON: 4/14/2024  FINDINGS:  No acute intracranial hemorrhage, midline shift or mass effect. No acute territorial infarct is identified.  Mild periventricular hypodensities suggest chronic small vessel ischemic change in a patient this age.  Arterial calcifications are seen at the base of the brain.  Ventricles, cisterns, cerebral sulci are unremarkable for patient age.  The visualized paranasal sinuses,  orbits, mastoid air cells are unremarkable.        No acute intracranial hemorrhage or hydrocephalus. If there is further clinical concern, MRI could be considered for further evaluation.  This report was finalized on 1/6/2025 5:19 PM by Dr. Mitul Hudson M.D on Workstation: [employer]      XR Ankle 3+ View Left    Result Date: 1/6/2025  XR ANKLE 3+ VW LEFT-  INDICATIONS: Trauma.  TECHNIQUE: 3 views of the left ankle  COMPARISON: None available  FINDINGS:  The bones appear diffusely osteopenic. No acute fracture, erosion, or dislocation is identified. Ankle mortise appears preserved. Mild calcaneal spurring. Soft tissue swelling is present around the ankle. Dystrophic soft tissue calcifications are noted. Arterial calcifications are seen. Follow-up/further evaluation can be obtained as indications persist.       As described.    This report was finalized on 1/6/2025 4:31 PM by Dr. Mitul Hudson M.D on Workstation: [id number]       Ordered the above noted radiological studies.  X-ray ankle independent interpreted by me and shows no evidence of fracture          PROCEDURES  Procedures            MEDICATIONS GIVEN IN ER  Medications - No data to display                MEDICAL DECISION MAKING, PROGRESS, and CONSULTS    All labs have been independently reviewed by me.  All radiology studies have been reviewed by me and I have also reviewed the radiology report.   EKG's independently viewed and interpreted by me.  Discussion below represents my analysis of pertinent findings related to patient's condition, differential diagnosis, treatment plan and final disposition.      Additional sources:  - Discussed/ obtained information from independent historians: None    - External (non-ED) record review: Epic reviewed patient seen by oncology 12/26/2024 for CLL    - Chronic or social conditions impacting care: None    - Shared decision making: None      Orders placed during this visit:  Orders Placed This Encounter    Procedures    CT Head Without Contrast    CT Chest Without Contrast Diagnostic    XR Ankle 3+ View Left    Protime-INR         Additional orders considered but not ordered:  None        Differential diagnosis includes but is not limited to:    Contusion versus fracture versus pneumothorax      Independent interpretation of labs, radiology studies, and discussions with consultants:  ED Course as of 01/06/25 1815   Mon Jan 06, 2025 1807 18:07 EST  Patient presents after fall and has head CT negative.  Patient has chest CT that is negative as well.  Patient will be discharged home.  Instructed to follow-up with primary provider. [SL]      ED Course User Index  [SL] Guillermo Collins MD                 DIAGNOSIS  Final diagnoses:   Contusion of scalp, initial encounter   Contusion of left chest wall, initial encounter         DISPOSITION  DISCHARGE    Patient discharged in stable condition.    Reviewed implications of results, diagnosis, meds, responsibility to follow up, warning signs and symptoms of possible worsening, potential complications and reasons to return to ER, including worsening symptoms    Patient/Family voiced understanding of above instructions.    Discussed plan for discharge, as there is no emergent indication for admission. Patient referred to primary care provider for BP management due to today's BP. Pt/family is agreeable and understands need for follow up and repeat testing.  Pt is aware that discharge does not mean that nothing is wrong but it indicates no emergency is present that requires admission and they must continue care with follow-up as given below or physician of their choice.     FOLLOW-UP  Zenaida Suarez MD  [address] [place] [id number] [phone]    Schedule an appointment as soon as possible for a visit            Medication List        Changed      furosemide 20 MG tablet  Commonly known as: LASIX  Take 1 tablet by mouth As Needed (For weight gain of  greater than 2 pounds in 1 day or 5 pounds in 1 week).  What changed: when to take this     warfarin 4 MG tablet  Commonly known as: COUMADIN  TAKE ONE-HALF (1/2) TABLET ON SUNDAY AND FRIDAY AND TAKE ONE TABLET ALL OTHER DAYS OR AS DIRECTED  What changed: See the new instructions.                       Latest Documented Vital Signs:  As of 18:15 EST  BP- 115/82 HR- 80 Temp- 98.5 °F (36.9 °C) (Tympanic) O2 sat- 96%              --    Please note that portions of this were completed with a voice recognition program.       Note Disclaimer: At [facility], we believe that sharing information builds trust and better relationships. You are receiving this note because you are receiving care at [facility] or recently visited. It is possible you will see health information before a provider has talked with you about it. This kind of information can be easy to misunderstand. To help you fully understand what it means for your health, we urge you to discuss this note with your provider.            Guillermo Collins MD  01/06/25 1342

## 2025-01-08 ENCOUNTER — ANTICOAGULATION VISIT (OUTPATIENT)
Dept: PHARMACY | Facility: HOSPITAL | Age: OVER 89
End: 2025-01-08
Payer: COMMERCIAL

## 2025-01-08 DIAGNOSIS — I48.0 PAROXYSMAL ATRIAL FIBRILLATION: Primary | ICD-10-CM

## 2025-01-08 NOTE — PROGRESS NOTES
Anticoagulation Clinic Progress Note    Anticoagulation Summary  As of 2025      INR goal:  2.0-3.0   TTR:  66.0% (6 y)   INR used for dosin.26 (2025)   Warfarin maintenance plan:  4 mg every Mon, Wed, Fri; 2 mg all other days   Weekly warfarin total:  20 mg   No change documented:  Petra Oliveira RPH   Plan last modified:  Petra Oliveira RPH (10/21/2024)   Next INR check:  1/15/2025   Priority:  Maintenance   Target end date:  Indefinite    Indications    Paroxysmal atrial fibrillation [I48.0]                 Anticoagulation Episode Summary       INR check location:  --    Preferred lab:  --    Send INR reminders to:   JACEY SINCLAIR  POOL    Comments:  [facility] beginning 20          Anticoagulation Care Providers       Provider Role Specialty Phone number    Jonah Regalado Jr., MD Referring Cardiology [phone]            Clinic Interview:  Patient Findings     Positives:  Emergency department visit    Negatives:  Signs/symptoms of thrombosis, Signs/symptoms of bleeding,   Laboratory test error suspected, Change in health, Change in alcohol use,   Change in activity, Upcoming invasive procedure, Upcoming dental   procedure, Missed doses, Extra doses, Change in medications, Change in   diet/appetite, Hospital admission, Bruising, Other complaints      Clinical Outcomes     Negatives:  Major bleeding event, Thromboembolic event,   Anticoagulation-related hospital admission, Anticoagulation-related ED   visit, Anticoagulation-related fatality        INR History:      Latest Ref Rng & Units 2024    10:11 AM 2024    12:00 AM 2024    10:30 AM 2024    12:00 AM 2024    10:30 AM 2025     3:52 PM 2025     3:03 PM   Anticoagulation Monitoring   INR  3.30  2.30  2.20  2.26   INR Date  2024   INR Goal  2.0-3.0  2.0-3.0  2.0-3.0  2.0-3.0   Trend  Same  Same  Same  Same   Last Week Total  18 mg  18 mg  20 mg  20  mg   Next Week Total  18 mg  20 mg  20 mg  20 mg   Sun  2 mg  2 mg  2 mg  2 mg   Mon  4 mg  4 mg  4 mg  4 mg   Tue  2 mg  2 mg  2 mg  2 mg   Wed  4 mg  4 mg  4 mg  4 mg   Thu  2 mg  2 mg  2 mg  2 mg   Fri  2 mg (12/6)  4 mg  4 mg  4 mg   Sat  2 mg  2 mg  2 mg  2 mg   Historical INR 0.90 - 1.10  2.30      2.20      2.26         This result is from an external source.       Plan:  1. INR is Therapeutic today- see above in Anticoagulation Summary.   Will instruct Caitlyn Longoria to Continue their warfarin regimen- see above in Anticoagulation Summary.  Fell and hit head and went to the ED. Pain level is 10 out of 10. Patient is taking aspirin and tylenol extra strength 6 tablets a day. Confirmed patient dose. Recheck INR on Wednesday at [facility].   2. Follow up in 1 weeks  3. They have been instructed to call if any changes in medications, doses, concerns, etc. Patient expresses understanding and has no further questions at this time.    Petra Oliveira [facility]

## 2025-01-20 ENCOUNTER — ANTICOAGULATION VISIT (OUTPATIENT)
Dept: PHARMACY | Facility: HOSPITAL | Age: OVER 89
End: 2025-01-20
Payer: COMMERCIAL

## 2025-01-20 DIAGNOSIS — I48.0 PAROXYSMAL ATRIAL FIBRILLATION: Primary | ICD-10-CM

## 2025-01-20 LAB — INR PPP: 8

## 2025-01-20 NOTE — PROGRESS NOTES
Anticoagulation Clinic Progress Note    Anticoagulation Summary  As of 2025      INR goal:  2.0-3.0   TTR:  65.7% (6 y)   INR used for dosin.00 (2025)   Warfarin maintenance plan:  4 mg every Mon, Wed, Fri; 2 mg all other days   Weekly warfarin total:  20 mg   Plan last modified:  Petra Oliveira RPH (10/21/2024)   Next INR check:  2025   Priority:  Maintenance   Target end date:  Indefinite    Indications    Paroxysmal atrial fibrillation [I48.0]                 Anticoagulation Episode Summary       INR check location:  --    Preferred lab:  --    Send INR reminders to:  MAGALIS SINCLAIR  POOL    Comments:  [facility] lab beginning 20          Anticoagulation Care Providers       Provider Role Specialty Phone number    Jonah Regalado Jr., MD Referring Cardiology [phone]            INR History:      2024    10:30 AM 2024    12:00 AM 2024    10:30 AM 2025     3:52 PM 2025     3:03 PM 2025    12:00 AM 2025     1:24 PM   Anticoagulation Monitoring   INR 2.30  2.20  2.26  8.00   INR Date 2024   INR Goal 2.0-3.0  2.0-3.0  2.0-3.0  2.0-3.0   Trend Same  Same  Same  Same   Last Week Total 18 mg  20 mg  20 mg  20 mg   Next Week Total 20 mg  20 mg  20 mg  14 mg   Sun 2 mg  2 mg  2 mg  -   Mon 4 mg  4 mg  4 mg  Hold ()   Tue 2 mg  2 mg  2 mg  Hold ()   Wed 4 mg  4 mg  4 mg  -   Thu 2 mg  2 mg  2 mg  -   Fri 4 mg  4 mg  4 mg  -   Sat 2 mg  2 mg  2 mg  -   Historical INR  2.20      2.26   8.00            This result is from an external source.       Plan:  1. INR is Supratherapeutic today- see above in Anticoagulation Summary.   Provided instructions to the patient to hold their warfarin regimen- see above in Anticoagulation Summary.  Patient fell on 25. INR today reading >8.0 twice on POC machine. Hold x 2 days and recheck INR on Wednesday at [facility] as patient stated she should be able to walk to  the clinic on Wednesday. No signs of bleeding.   2. Follow up in 2 days      Petra Oliveira RPH

## 2025-01-21 RX ORDER — WARFARIN SODIUM 4 MG/1
TABLET ORAL
Qty: 65 TABLET | Refills: 1 | Status: SHIPPED | OUTPATIENT
Start: 2025-01-21

## 2025-01-22 ENCOUNTER — DOCUMENTATION (OUTPATIENT)
Dept: ONCOLOGY | Facility: CLINIC | Age: OVER 89
End: 2025-01-22
Payer: COMMERCIAL

## 2025-01-23 ENCOUNTER — INFUSION (OUTPATIENT)
Dept: ONCOLOGY | Facility: HOSPITAL | Age: OVER 89
End: 2025-01-23
Payer: MEDICARE

## 2025-01-23 VITALS
BODY MASS INDEX: 31.72 KG/M2 | WEIGHT: 179 LBS | DIASTOLIC BLOOD PRESSURE: 60 MMHG | RESPIRATION RATE: 16 BRPM | TEMPERATURE: 97.7 F | SYSTOLIC BLOOD PRESSURE: 93 MMHG | OXYGEN SATURATION: 93 % | HEART RATE: 89 BPM

## 2025-01-23 DIAGNOSIS — C91.12 CLL (CHRONIC LYMPHOID LEUKEMIA) IN RELAPSE: ICD-10-CM

## 2025-01-23 DIAGNOSIS — C91.10 CLL (CHRONIC LYMPHOCYTIC LEUKEMIA): Primary | ICD-10-CM

## 2025-01-23 LAB
BASOPHILS # BLD AUTO: 0.02 10*3/MM3 (ref 0–0.2)
BASOPHILS NFR BLD AUTO: 0.2 % (ref 0–1.5)
DEPRECATED RDW RBC AUTO: 57 FL (ref 37–54)
EOSINOPHIL # BLD AUTO: 0 10*3/MM3 (ref 0–0.4)
EOSINOPHIL NFR BLD AUTO: 0 % (ref 0.3–6.2)
ERYTHROCYTE [DISTWIDTH] IN BLOOD BY AUTOMATED COUNT: 15.3 % (ref 12.3–15.4)
HCT VFR BLD AUTO: 40 % (ref 34–46.6)
HGB BLD-MCNC: 12.2 G/DL (ref 12–15.9)
IGA1 MFR SER: <50 MG/DL (ref 70–400)
IGG1 SER-MCNC: 941 MG/DL (ref 700–1600)
IGM SERPL-MCNC: <25 MG/DL (ref 40–230)
IMM GRANULOCYTES # BLD AUTO: 0.02 10*3/MM3 (ref 0–0.05)
IMM GRANULOCYTES NFR BLD AUTO: 0.2 % (ref 0–0.5)
LYMPHOCYTES # BLD AUTO: 8.88 10*3/MM3 (ref 0.7–3.1)
LYMPHOCYTES NFR BLD AUTO: 67.4 % (ref 19.6–45.3)
MCH RBC QN AUTO: 31.1 PG (ref 26.6–33)
MCHC RBC AUTO-ENTMCNC: 30.5 G/DL (ref 31.5–35.7)
MCV RBC AUTO: 102 FL (ref 79–97)
MONOCYTES # BLD AUTO: 0.23 10*3/MM3 (ref 0.1–0.9)
MONOCYTES NFR BLD AUTO: 1.7 % (ref 5–12)
NEUTROPHILS NFR BLD AUTO: 30.5 % (ref 42.7–76)
NEUTROPHILS NFR BLD AUTO: 4.02 10*3/MM3 (ref 1.7–7)
NRBC BLD AUTO-RTO: 0 /100 WBC (ref 0–0.2)
PLATELET # BLD AUTO: 246 10*3/MM3 (ref 140–450)
PMV BLD AUTO: 9.3 FL (ref 6–12)
RBC # BLD AUTO: 3.92 10*6/MM3 (ref 3.77–5.28)
WBC NRBC COR # BLD AUTO: 13.17 10*3/MM3 (ref 3.4–10.8)

## 2025-01-23 PROCEDURE — 82784 ASSAY IGA/IGD/IGG/IGM EACH: CPT | Performed by: INTERNAL MEDICINE

## 2025-01-23 PROCEDURE — 96366 THER/PROPH/DIAG IV INF ADDON: CPT

## 2025-01-23 PROCEDURE — 63710000001 ACETAMINOPHEN 325 MG TABLET: Performed by: NURSE PRACTITIONER

## 2025-01-23 PROCEDURE — 96365 THER/PROPH/DIAG IV INF INIT: CPT

## 2025-01-23 PROCEDURE — A9270 NON-COVERED ITEM OR SERVICE: HCPCS | Performed by: NURSE PRACTITIONER

## 2025-01-23 PROCEDURE — 85025 COMPLETE CBC W/AUTO DIFF WBC: CPT

## 2025-01-23 PROCEDURE — 63710000001 HYDROXYZINE PAMOATE PER 25 MG: Performed by: NURSE PRACTITIONER

## 2025-01-23 PROCEDURE — 25010000002 IMMUNE GLOBULIN (HUMAN) 30 GM/300ML SOLUTION: Performed by: NURSE PRACTITIONER

## 2025-01-23 RX ORDER — ACETAMINOPHEN 325 MG/1
650 TABLET ORAL ONCE
Status: COMPLETED | OUTPATIENT
Start: 2025-01-23 | End: 2025-01-23

## 2025-01-23 RX ORDER — FAMOTIDINE 10 MG/ML
20 INJECTION, SOLUTION INTRAVENOUS AS NEEDED
Status: CANCELLED | OUTPATIENT
Start: 2025-01-23

## 2025-01-23 RX ORDER — HYDROXYZINE PAMOATE 25 MG/1
25 CAPSULE ORAL ONCE
Status: CANCELLED | OUTPATIENT
Start: 2025-01-23

## 2025-01-23 RX ORDER — SODIUM CHLORIDE 9 MG/ML
250 INJECTION, SOLUTION INTRAVENOUS ONCE
Status: CANCELLED | OUTPATIENT
Start: 2025-01-23

## 2025-01-23 RX ORDER — DIPHENHYDRAMINE HYDROCHLORIDE 50 MG/ML
50 INJECTION INTRAMUSCULAR; INTRAVENOUS AS NEEDED
Status: CANCELLED | OUTPATIENT
Start: 2025-01-23

## 2025-01-23 RX ORDER — HYDROXYZINE PAMOATE 25 MG/1
25 CAPSULE ORAL ONCE
Status: COMPLETED | OUTPATIENT
Start: 2025-01-23 | End: 2025-01-23

## 2025-01-23 RX ORDER — ACETAMINOPHEN 325 MG/1
650 TABLET ORAL ONCE
Status: CANCELLED | OUTPATIENT
Start: 2025-01-23

## 2025-01-23 RX ADMIN — IMMUNE GLOBULIN INFUSION (HUMAN) 30 G: 100 INJECTION, SOLUTION INTRAVENOUS; SUBCUTANEOUS at 10:09

## 2025-01-23 RX ADMIN — ACETAMINOPHEN 650 MG: 325 TABLET ORAL at 09:46

## 2025-01-23 RX ADMIN — HYDROXYZINE PAMOATE 25 MG: 25 CAPSULE ORAL at 09:46

## 2025-01-28 ENCOUNTER — ANTICOAGULATION VISIT (OUTPATIENT)
Dept: PHARMACY | Facility: HOSPITAL | Age: OVER 89
End: 2025-01-28
Payer: COMMERCIAL

## 2025-01-28 DIAGNOSIS — I48.0 PAROXYSMAL ATRIAL FIBRILLATION: Primary | ICD-10-CM

## 2025-01-28 NOTE — PROGRESS NOTES
Anticoagulation Clinic Progress Note    Anticoagulation Summary  As of 2025      INR goal:  2.0-3.0   TTR:  65.6% (6 y)   INR used for dosin.00 (2025)   Warfarin maintenance plan:  4 mg every Mon, Wed, Fri; 2 mg all other days   Weekly warfarin total:  20 mg   Plan last modified:  Petra Oliveira RPH (10/21/2024)   Next INR check:  2025   Priority:  Maintenance   Target end date:  Indefinite    Indications    Paroxysmal atrial fibrillation [I48.0]                 Anticoagulation Episode Summary       INR check location:  --    Preferred lab:  --    Send INR reminders to:  MAGALIS SINCLAIR  POOL    Comments:  [facility] beginning 20          Anticoagulation Care Providers       Provider Role Specialty Phone number    Jonah Regalado Jr., MD Referring Cardiology [phone]            Clinic Interview:  Patient Findings     Positives:  Bruising, Other complaints    Negatives:  Signs/symptoms of thrombosis, Signs/symptoms of bleeding,   Laboratory test error suspected, Change in health, Change in alcohol use,   Change in activity, Upcoming invasive procedure, Emergency department   visit, Upcoming dental procedure, Missed doses, Extra doses, Change in   medications, Change in diet/appetite, Hospital admission    Comments:  Ms. Longoria called to report that she resumed her usual dose   of warfarin on 25 and ultimately visited the [facility]   on 25. She reports the result was called to us while she was in the   room; however, we do not have documentation of receiving that INR. Ms. Longoria reports her INR was 4.0 that day. Unfortunately, the [facility] is only open on Mon, Wed, and Fri. Patient does not feel it is   possible for her to visit the [facility] tomorrow, and instead requests   to visit Friday. She denies any s/sx of bleeding; although she reports   bruising still being present from her fall on 25.       Clinical Outcomes      Negatives:  Major bleeding event, Thromboembolic event,   Anticoagulation-related hospital admission, Anticoagulation-related ED   visit, Anticoagulation-related fatality    Comments:  Ms. Longoria called to report that she resumed her usual dose   of warfarin on 1/22/25 and ultimately visited the [facility]   on 1/24/25. She reports the result was called to us while she was in the   room; however, we do not have documentation of receiving that INR. Ms. Longoria reports her INR was 4.0 that day. Unfortunately, the [facility] is only open on Mon, Wed, and Fri. Patient does not feel it is   possible for her to visit the [facility] tomorrow, and instead requests   to visit Friday. She denies any s/sx of bleeding; although she reports   bruising still being present from her fall on 1/6/25.         INR History:      12/18/2024    10:30 AM 1/6/2025     3:52 PM 1/8/2025     3:03 PM 1/20/2025    12:00 AM 1/20/2025     1:24 PM 1/24/2025    12:00 AM 1/28/2025    10:23 AM   Anticoagulation Monitoring   INR 2.20  2.26  8.00  4.00   INR Date 12/18/2024 1/6/2025 1/20/2025 1/24/2025   INR Goal 2.0-3.0  2.0-3.0  2.0-3.0  2.0-3.0   Trend Same  Same  Same  Same   Last Week Total 20 mg  20 mg  20 mg  18 mg   Next Week Total 20 mg  20 mg  14 mg  16 mg   Sun 2 mg  2 mg  -  -   Mon 4 mg  4 mg  Hold (1/20)  -   Tue 2 mg  2 mg  Hold (1/21)  Hold (1/28)   Wed 4 mg  4 mg  -  2 mg (1/29)   Thu 2 mg  2 mg  -  2 mg   Fri 4 mg  4 mg  -  -   Sat 2 mg  2 mg  -  -   Historical INR  2.26   8.00      4.00            This result is from an external source.       Plan:  1. INR is  unknown  today (INR was reportedly 4.0 on 1/24/25) - see above in Anticoagulation Summary. It is possible her INR may remain supratherapeutic at present.   Will instruct Caitlyn Longoria to Change their warfarin regimen (HOLD today, then take 2 mg daily until INR check in 3 days) - see above in Anticoagulation Summary.  2. Follow up in 3  days, as patient does not feel it is possible for her to visit the [facility] tomorrow and they are only open on Mondays, Wednesdays, and Fridays.   3. They have been instructed to call if any changes in medications, doses, concerns, etc. Patient expresses understanding and has no further questions at this time.    Alexander Valiente, PharmD

## 2025-01-31 ENCOUNTER — TELEPHONE (OUTPATIENT)
Dept: PHARMACY | Facility: HOSPITAL | Age: OVER 89
End: 2025-01-31
Payer: COMMERCIAL

## 2025-01-31 ENCOUNTER — ANTICOAGULATION VISIT (OUTPATIENT)
Dept: PHARMACY | Facility: HOSPITAL | Age: OVER 89
End: 2025-01-31
Payer: COMMERCIAL

## 2025-01-31 DIAGNOSIS — I48.0 PAROXYSMAL ATRIAL FIBRILLATION: Primary | ICD-10-CM

## 2025-01-31 NOTE — PROGRESS NOTES
Anticoagulation Clinic Progress Note    Anticoagulation Summary  As of 1/31/2025      INR goal:  2.0-3.0   TTR:  65.6% (6 y)   INR used for dosing:  No new INR was available at the time of this encounter.   Warfarin maintenance plan:  4 mg every Mon, Wed, Fri; 2 mg all other days   Weekly warfarin total:  20 mg   Plan last modified:  Petra Oliveira RPH (10/21/2024)   Next INR check:  2/3/2025   Priority:  Maintenance   Target end date:  Indefinite    Indications    Paroxysmal atrial fibrillation [I48.0]                 Anticoagulation Episode Summary       INR check location:  --    Preferred lab:  --    Send INR reminders to:  MAGAL[facility] SINCLAIR  POOL    Comments:  [facility] beginning 4/22/20          Anticoagulation Care Providers       Provider Role Specialty Phone number    Jonah Regalado Jr., MD Referring Cardiology [phone]            Clinic Interview:  Patient Findings     Positives:  Other complaints    Negatives:  Signs/symptoms of thrombosis, Signs/symptoms of bleeding,   Laboratory test error suspected, Change in health, Change in alcohol use,   Change in activity, Upcoming invasive procedure, Emergency department   visit, Upcoming dental procedure, Missed doses, Extra doses, Change in   medications, Change in diet/appetite, Hospital admission, Bruising    Comments:  Ms. Longoria informed clinic that instead of visiting the   [facility] for INR check today, she scheduled her INR for   Monday.      Clinical Outcomes     Negatives:  Major bleeding event, Thromboembolic event,   Anticoagulation-related hospital admission, Anticoagulation-related ED   visit, Anticoagulation-related fatality    Comments:  Ms. Longoria informed clinic that instead of visiting the   [facility] for INR check today, she scheduled her INR for   Monday.        INR History:      1/6/2025     3:52 PM 1/8/2025     3:03 PM 1/20/2025    12:00 AM 1/20/2025     1:24 PM 1/24/2025    12:00 AM  1/28/2025    10:23 AM 1/31/2025     4:00 PM   Anticoagulation Monitoring   INR  2.26  8.00  4.00 --   INR Date  1/6/2025 1/20/2025 1/24/2025    INR Goal  2.0-3.0  2.0-3.0  2.0-3.0 2.0-3.0   Trend  Same  Same  Same Same   Last Week Total  20 mg  20 mg  18 mg 16 mg   Next Week Total  20 mg  14 mg  16 mg 18 mg   Sun  2 mg  -  - 2 mg   Mon  4 mg  Hold (1/20)  - -   Tue  2 mg  Hold (1/21)  Hold (1/28) -   Wed  4 mg  -  2 mg (1/29) -   Thu  2 mg  -  2 mg -   Fri  4 mg  -  - 2 mg (1/31)   Sat  2 mg  -  - 2 mg   Historical INR 2.26   8.00      4.00             This result is from an external source.       Plan:  1. INR is  unknown  today- see above in Anticoagulation Summary.   Will instruct Caitlyn TUCKER Prietosabina to Change their warfarin regimen- see above in Anticoagulation Summary. In light of her severely elevated INR last week without clear explanation, advised 2 mg daily until INR check on 2/3/25.   2. Follow up in 3 days.  3. They have been instructed to call if any changes in medications, doses, concerns, etc. Patient expresses understanding and has no further questions at this time.    Alexander Valiente, PharmD

## 2025-01-31 NOTE — TELEPHONE ENCOUNTER
No INR has been received from the [facility]. Attempted to contact MsBradley Von without success; left voicemail requesting return call. Attempted to contact the [facility]; left voicemail requesting return call.

## 2025-02-03 ENCOUNTER — TELEPHONE (OUTPATIENT)
Dept: PHARMACY | Facility: HOSPITAL | Age: OVER 89
End: 2025-02-03
Payer: COMMERCIAL

## 2025-02-03 ENCOUNTER — ANTICOAGULATION VISIT (OUTPATIENT)
Dept: PHARMACY | Facility: HOSPITAL | Age: OVER 89
End: 2025-02-03
Payer: COMMERCIAL

## 2025-02-03 DIAGNOSIS — I48.0 PAROXYSMAL ATRIAL FIBRILLATION: Primary | ICD-10-CM

## 2025-02-03 NOTE — PROGRESS NOTES
Anticoagulation Clinic Progress Note    Anticoagulation Summary  As of 2/3/2025      INR goal:  2.0-3.0   TTR:  65.6% (6 y)   INR used for dosing:  No new INR was available at the time of this encounter.   Warfarin maintenance plan:  4 mg every Mon, Wed, Fri; 2 mg all other days   Weekly warfarin total:  20 mg   Plan last modified:  Petra Oliveira RPH (10/21/2024)   Next INR check:  2/5/2025   Priority:  Maintenance   Target end date:  Indefinite    Indications    Paroxysmal atrial fibrillation [I48.0]                 Anticoagulation Episode Summary       INR check location:  --    Preferred lab:  --    Send INR reminders to:  MAGAL[facility] SINCLAIR  POOL    Comments:  [place] lab beginning 4/22/20          Anticoagulation Care Providers       Provider Role Specialty Phone number    Jonah Regalado Jr., MD Referring Cardiology [phone]            Clinic Interview:  Patient Findings     Positives:  Other complaints    Negatives:  Signs/symptoms of thrombosis, Signs/symptoms of bleeding,   Laboratory test error suspected, Change in health, Change in alcohol use,   Change in activity, Upcoming invasive procedure, Emergency department   visit, Upcoming dental procedure, Missed doses, Extra doses, Change in   medications, Change in diet/appetite, Hospital admission, Bruising    Comments:  Ms. Longoria informed clinic that the staff member at [facility] that does the INR checks is out of office this entire   week. She plans to arranged for a ride to clinic ~Wednesday this week for   INR check.       Clinical Outcomes     Negatives:  Major bleeding event, Thromboembolic event,   Anticoagulation-related hospital admission, Anticoagulation-related ED   visit, Anticoagulation-related fatality    Comments:  Ms. Longoria informed clinic that the staff member at [facility] that does the INR checks is out of office this entire   week. She plans to arranged for a ride to clinic ~Wednesday  this week for   INR check.         INR History:      1/8/2025     3:03 PM 1/20/2025    12:00 AM 1/20/2025     1:24 PM 1/24/2025    12:00 AM 1/28/2025    10:23 AM 1/31/2025     4:00 PM 2/3/2025     3:44 PM   Anticoagulation Monitoring   INR 2.26  8.00  4.00 -- --   INR Date 1/6/2025 1/20/2025 1/24/2025     INR Goal 2.0-3.0  2.0-3.0  2.0-3.0 2.0-3.0 2.0-3.0   Trend Same  Same  Same Same Same   Last Week Total 20 mg  20 mg  18 mg 16 mg 14 mg   Next Week Total 20 mg  14 mg  16 mg 18 mg 18 mg   Sun 2 mg  -  - 2 mg -   Mon 4 mg  Hold (1/20)  - - 2 mg (2/3)   Tue 2 mg  Hold (1/21)  Hold (1/28) - 2 mg   Wed 4 mg  -  2 mg (1/29) - -   Thu 2 mg  -  2 mg - -   Fri 4 mg  -  - 2 mg (1/31) -   Sat 2 mg  -  - 2 mg -   Historical INR  8.00      4.00              This result is from an external source.       Plan:  1. INR is  unknown  today- see above in Anticoagulation Summary.   Will instruct Caitlyn Longoria to Change their warfarin regimen to 2 mg daily for now in light of the severely supratherapeutic INR recently - see above in Anticoagulation Summary.  2. Follow up in 2 days in clinic. As above, she plans to call clinic to schedule once she confirms she has a ride.    3. They have been instructed to call if any changes in medications, doses, concerns, etc. Patient expresses understanding and has no further questions at this time.    Alexander Valiente, PharmD

## 2025-02-03 NOTE — TELEPHONE ENCOUNTER
INR has not been received from [facility] yet. Left voicemail for Ms. Von requesting return call.

## 2025-02-05 ENCOUNTER — ANTICOAGULATION VISIT (OUTPATIENT)
Dept: PHARMACY | Facility: HOSPITAL | Age: OVER 89
End: 2025-02-05
Payer: MEDICARE

## 2025-02-05 DIAGNOSIS — I48.0 PAROXYSMAL ATRIAL FIBRILLATION: Primary | ICD-10-CM

## 2025-02-05 LAB
INR PPP: 1.7 (ref 0.91–1.09)
PROTHROMBIN TIME: 19.9 SECONDS (ref 10–13.8)

## 2025-02-05 PROCEDURE — 85610 PROTHROMBIN TIME: CPT

## 2025-02-05 PROCEDURE — G0463 HOSPITAL OUTPT CLINIC VISIT: HCPCS

## 2025-02-05 NOTE — PROGRESS NOTES
Anticoagulation Clinic Progress Note    Anticoagulation Summary  As of 2025      INR goal:  2.0-3.0   TTR:  65.5% (6.1 y)   INR used for dosin.7 (2025)   Warfarin maintenance plan:  4 mg every Wed; 2 mg all other days   Weekly warfarin total:  16 mg   Plan last modified:  Alexander Valiente, PharmD (2025)   Next INR check:  2025   Priority:  Maintenance   Target end date:  Indefinite    Indications    Paroxysmal atrial fibrillation [I48.0]                 Anticoagulation Episode Summary       INR check location:  --    Preferred lab:  --    Send INR reminders to:  [facility]AL[facility] SINCLAIR  POOL    Comments:  [facility] beginning 20          Anticoagulation Care Providers       Provider Role Specialty Phone number    Jonah Regalado Jr., MD Referring Cardiology [phone]            Clinic Interview:  Patient Findings     Negatives:  Signs/symptoms of thrombosis, Signs/symptoms of bleeding,   Laboratory test error suspected, Change in health, Change in alcohol use,   Change in activity, Upcoming invasive procedure, Emergency department   visit, Upcoming dental procedure, Missed doses, Extra doses, Change in   medications, Change in diet/appetite, Hospital admission, Bruising, Other   complaints    Comments:  She confirms she has been taking warfarin 2 mg daily since   25 as we had cautiously directed based on her recent supratherapeutic   INRs and inability to have her INR tested sooner.       Clinical Outcomes     Negatives:  Major bleeding event, Thromboembolic event,   Anticoagulation-related hospital admission, Anticoagulation-related ED   visit, Anticoagulation-related fatality    Comments:  She confirms she has been taking warfarin 2 mg daily since   25 as we had cautiously directed based on her recent supratherapeutic   INRs and inability to have her INR tested sooner.         INR History:      2025    12:00 AM 2025     1:24 PM 2025    12:00 AM 2025     10:23 AM 1/31/2025     4:00 PM 2/3/2025     3:44 PM 2/5/2025    11:00 AM   Anticoagulation Monitoring   INR  8.00  4.00 -- -- 1.7   INR Date  1/20/2025 1/24/2025 2/5/2025   INR Goal  2.0-3.0  2.0-3.0 2.0-3.0 2.0-3.0 2.0-3.0   Trend  Same  Same Same Same Down   Last Week Total  20 mg  18 mg 16 mg 14 mg 14 mg   Next Week Total  14 mg  16 mg 18 mg 18 mg 16 mg   Sun  -  - 2 mg - 2 mg   Mon  Hold (1/20)  - - 2 mg (2/3) 2 mg   Tue  Hold (1/21)  Hold (1/28) - 2 mg 2 mg   Wed  -  2 mg (1/29) - - 4 mg   Thu  -  2 mg - - 2 mg   Fri  -  - 2 mg (1/31) - 2 mg   Sat  -  - 2 mg - 2 mg   Historical INR 8.00      4.00               This result is from an external source.       Plan:  1. INR is Subtherapeutic today- see above in Anticoagulation Summary.  Will instruct Caitlyn Longoria to Increase their warfarin regimen to 4 mg Wed, 2 mg all other days - see above in Anticoagulation Summary.  2. Follow up in 1 week at [facility]  3. Patient declines warfarin refills.  4. Verbal and written information provided. Patient expresses understanding and has no further questions at this time.    Alexander Valiente, PharmD

## 2025-02-07 ENCOUNTER — APPOINTMENT (OUTPATIENT)
Dept: CT IMAGING | Facility: HOSPITAL | Age: OVER 89
End: 2025-02-07
Payer: MEDICARE

## 2025-02-07 ENCOUNTER — HOSPITAL ENCOUNTER (EMERGENCY)
Facility: HOSPITAL | Age: OVER 89
Discharge: HOME | End: 2025-02-07
Attending: EMERGENCY MEDICINE
Payer: MEDICARE

## 2025-02-07 VITALS
TEMPERATURE: 97 F | HEART RATE: 80 BPM | DIASTOLIC BLOOD PRESSURE: 92 MMHG | SYSTOLIC BLOOD PRESSURE: 145 MMHG | WEIGHT: 173 LBS | BODY MASS INDEX: 30.65 KG/M2 | HEIGHT: 63 IN | RESPIRATION RATE: 17 BRPM | OXYGEN SATURATION: 94 %

## 2025-02-07 DIAGNOSIS — R10.12 LEFT UPPER QUADRANT PAIN: ICD-10-CM

## 2025-02-07 DIAGNOSIS — K57.32 SIGMOID DIVERTICULITIS: Primary | ICD-10-CM

## 2025-02-07 LAB
ALBUMIN SERPL-MCNC: 3.1 G/DL (ref 3.5–5.2)
ALBUMIN/GLOB SERPL: 0.9 G/DL
ALP SERPL-CCNC: 195 U/L (ref 39–117)
ALT SERPL W P-5'-P-CCNC: 14 U/L (ref 1–33)
ANION GAP SERPL CALCULATED.3IONS-SCNC: 8.8 MMOL/L (ref 5–15)
ANISOCYTOSIS BLD QL: ABNORMAL
AST SERPL-CCNC: 23 U/L (ref 1–32)
BILIRUB SERPL-MCNC: 0.7 MG/DL (ref 0–1.2)
BUN SERPL-MCNC: 17 MG/DL (ref 8–23)
BUN/CREAT SERPL: 19.3 (ref 7–25)
CALCIUM SPEC-SCNC: 8.9 MG/DL (ref 8.2–9.6)
CHLORIDE SERPL-SCNC: 105 MMOL/L (ref 98–107)
CO2 SERPL-SCNC: 25.2 MMOL/L (ref 22–29)
CREAT SERPL-MCNC: 0.88 MG/DL (ref 0.57–1)
DEPRECATED RDW RBC AUTO: 47.8 FL (ref 37–54)
EGFRCR SERPLBLD CKD-EPI 2021: 62.5 ML/MIN/1.73
ERYTHROCYTE [DISTWIDTH] IN BLOOD BY AUTOMATED COUNT: 13.7 % (ref 12.3–15.4)
GIANT PLATELETS: ABNORMAL
GLOBULIN UR ELPH-MCNC: 3.5 GM/DL
GLUCOSE SERPL-MCNC: 208 MG/DL (ref 65–99)
HCT VFR BLD AUTO: 35.7 % (ref 34–46.6)
HGB BLD-MCNC: 11.8 G/DL (ref 12–15.9)
INR PPP: 2.02 (ref 0.9–1.1)
LIPASE SERPL-CCNC: 15 U/L (ref 13–60)
LYMPHOCYTES # BLD MANUAL: 5.48 10*3/MM3 (ref 0.7–3.1)
LYMPHOCYTES NFR BLD MANUAL: 4 % (ref 5–12)
MCH RBC QN AUTO: 32 PG (ref 26.6–33)
MCHC RBC AUTO-ENTMCNC: 33.1 G/DL (ref 31.5–35.7)
MCV RBC AUTO: 96.7 FL (ref 79–97)
MONOCYTES # BLD: 0.41 10*3/MM3 (ref 0.1–0.9)
NEUTROPHILS # BLD AUTO: 4.26 10*3/MM3 (ref 1.7–7)
NEUTROPHILS NFR BLD MANUAL: 42 % (ref 42.7–76)
PLATELET # BLD AUTO: 181 10*3/MM3 (ref 140–450)
PMV BLD AUTO: 10.9 FL (ref 6–12)
POIKILOCYTOSIS BLD QL SMEAR: ABNORMAL
POTASSIUM SERPL-SCNC: 3.5 MMOL/L (ref 3.5–5.2)
PROT SERPL-MCNC: 6.6 G/DL (ref 6–8.5)
PROTHROMBIN TIME: 23 SECONDS (ref 11.7–14.2)
RBC # BLD AUTO: 3.69 10*6/MM3 (ref 3.77–5.28)
SMALL PLATELETS BLD QL SMEAR: ADEQUATE
SMUDGE CELLS BLD QL SMEAR: ABNORMAL
SODIUM SERPL-SCNC: 139 MMOL/L (ref 136–145)
VARIANT LYMPHS NFR BLD MANUAL: 3 % (ref 0–5)
VARIANT LYMPHS NFR BLD MANUAL: 51 % (ref 19.6–45.3)
WBC NRBC COR # BLD AUTO: 10.14 10*3/MM3 (ref 3.4–10.8)

## 2025-02-07 PROCEDURE — 25510000001 IOPAMIDOL 61 % SOLUTION: Performed by: EMERGENCY MEDICINE

## 2025-02-07 PROCEDURE — 85025 COMPLETE CBC W/AUTO DIFF WBC: CPT | Performed by: EMERGENCY MEDICINE

## 2025-02-07 PROCEDURE — 96360 HYDRATION IV INFUSION INIT: CPT

## 2025-02-07 PROCEDURE — 74177 CT ABD & PELVIS W/CONTRAST: CPT

## 2025-02-07 PROCEDURE — 99285 EMERGENCY DEPT VISIT HI MDM: CPT

## 2025-02-07 PROCEDURE — 85610 PROTHROMBIN TIME: CPT | Performed by: EMERGENCY MEDICINE

## 2025-02-07 PROCEDURE — 80053 COMPREHEN METABOLIC PANEL: CPT | Performed by: EMERGENCY MEDICINE

## 2025-02-07 PROCEDURE — 85007 BL SMEAR W/DIFF WBC COUNT: CPT | Performed by: EMERGENCY MEDICINE

## 2025-02-07 PROCEDURE — 25810000003 SODIUM CHLORIDE 0.9 % SOLUTION: Performed by: EMERGENCY MEDICINE

## 2025-02-07 PROCEDURE — 83690 ASSAY OF LIPASE: CPT | Performed by: EMERGENCY MEDICINE

## 2025-02-07 RX ORDER — HYDROCODONE BITARTRATE AND ACETAMINOPHEN 5; 325 MG/1; MG/1
1 TABLET ORAL ONCE
Status: COMPLETED | OUTPATIENT
Start: 2025-02-07 | End: 2025-02-07

## 2025-02-07 RX ORDER — LIDOCAINE 4 G/G
1 PATCH TOPICAL ONCE
Status: DISCONTINUED | OUTPATIENT
Start: 2025-02-07 | End: 2025-02-08 | Stop reason: HOSPADM

## 2025-02-07 RX ORDER — METHOCARBAMOL 750 MG/1
750 TABLET, FILM COATED ORAL ONCE
Status: COMPLETED | OUTPATIENT
Start: 2025-02-07 | End: 2025-02-07

## 2025-02-07 RX ORDER — METHOCARBAMOL 750 MG/1
750 TABLET, FILM COATED ORAL 3 TIMES DAILY PRN
Qty: 21 TABLET | Refills: 0 | Status: SHIPPED | OUTPATIENT
Start: 2025-02-07

## 2025-02-07 RX ORDER — IOPAMIDOL 612 MG/ML
85 INJECTION, SOLUTION INTRAVASCULAR
Status: COMPLETED | OUTPATIENT
Start: 2025-02-07 | End: 2025-02-07

## 2025-02-07 RX ADMIN — METHOCARBAMOL TABLETS 750 MG: 750 TABLET, COATED ORAL at 22:56

## 2025-02-07 RX ADMIN — AMOXICILLIN AND CLAVULANATE POTASSIUM 1 TABLET: 875; 125 TABLET, FILM COATED ORAL at 22:56

## 2025-02-07 RX ADMIN — LIDOCAINE 1 PATCH: 4 PATCH TOPICAL at 22:57

## 2025-02-07 RX ADMIN — IOPAMIDOL 85 ML: 612 INJECTION, SOLUTION INTRAVENOUS at 21:38

## 2025-02-07 RX ADMIN — HYDROCODONE BITARTRATE AND ACETAMINOPHEN 1 TABLET: 5; 325 TABLET ORAL at 20:18

## 2025-02-07 RX ADMIN — SODIUM CHLORIDE 1000 ML: 9 INJECTION, SOLUTION INTRAVENOUS at 20:20

## 2025-02-08 NOTE — ED PROVIDER NOTES
EMERGENCY DEPARTMENT ENCOUNTER  Room Number:  19/19  PCP: Zenaida Suarez MD  Independent Historians: Patient  Date of encounter:  2/7/2025  Patient Care Team:  Zenaida Suarez MD as PCP - General (Internal Medicine)  Pao Levi [facility] as Pharmacist  Alexander Valiente, PharmD as Pharmacist (Pharmacy)  Zenaida Suarez MD as Referring Physician (Internal Medicine)  Lucien Larkin MD as Consulting Physician (Hematology and Oncology)     HPI:  Chief Complaint: had concerns including Abdominal Pain.     A complete HPI/ROS/PMH/PSH/SH/FH are unobtainable due to: None    Chronic or social conditions impacting patient care (Social Determinants of Health): None    Context: Caitlyn Longoria is a 89 y.o. female with a medical history of diabetes, atrial fibrillation on warfarin, coronary disease, CVA, hypertension, CKD, CLL, CHF, COPD who presents to the ED c/o acute abdominal pain.  Patient has had 2 days of left upper quadrant abdominal pain.  Described as a sharp pain that worsens with walking or any movement.  No falls or injuries.  No associated symptoms including nausea, vomiting, fever, chills, diarrhea, constipation or urinary complaint.  Patient has not tried any therapies or medications for this.     PAST MEDICAL HISTORY  Active Ambulatory Problems     Diagnosis Date Noted    Neck and shoulder pain 03/24/2017    Arthropathy of shoulder region 03/24/2017    Carpal tunnel syndrome of left wrist 03/24/2017    Chronic pain of both shoulders 06/27/2017    Chronic left shoulder pain 12/05/2017    Arthropathy of left shoulder 12/05/2017    Dysarthria 12/07/2017    Type 2 diabetes mellitus 12/07/2017    Paroxysmal atrial fibrillation 12/07/2017    HLD (hyperlipidemia) 12/07/2017    Chronic bronchitis 12/07/2017    Coronary artery disease 12/07/2017    CVA (cerebral vascular accident) 12/10/2017    HTN (hypertension) 01/14/2018    CKD (chronic kidney disease), stage III 01/14/2018    Chronic  combined systolic (congestive) and diastolic (congestive) heart failure 01/15/2018    Infection of prosthetic right knee joint 01/17/2018    Stasis dermatitis of right lower extremity due to peripheral venous hypertension 04/28/2018    Current use of long term anticoagulation 04/28/2018    Morbid obesity 02/08/2019    Acute respiratory failure with hypoxia 09/16/2019    Rhinovirus 02/11/2020    Asthma with acute exacerbation 02/11/2020    Acute respiratory failure with hypoxemia 02/12/2020    Viral pneumonia 04/23/2020    Hypoxia 08/29/2020    CLL (chronic lymphocytic leukemia) 08/31/2020    Dyspnea 09/29/2020    Anticoagulated on Coumadin     Osteoporotic compression fracture of spine 09/30/2020    Pneumonia due to gram-negative bacteria 10/02/2020    Pneumonia due to infectious organism 09/29/2020    Bilateral carotid artery disease 10/28/2020    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    Cellulitis of right lower extremity 04/14/2024    Hypokalemia 04/15/2024    Nocturnal hypoxia 04/15/2024    COPD (chronic obstructive pulmonary disease) 04/15/2024    Upper respiratory tract infection 08/25/2024    COPD (chronic obstructive pulmonary disease) 08/25/2024    Sick sinus syndrome 08/25/2024    Anemia 08/25/2024    Thrombocytopenia 08/25/2024    Chronic HFrEF (heart failure with reduced ejection fraction) 08/25/2024    Cellulitis of left lower leg 08/26/2024    Acute exacerbation of COPD with asthma 09/27/2024    Leukocytosis 09/27/2024    COPD exacerbation 09/27/2024     Resolved Ambulatory Problems     Diagnosis Date Noted    Hypernatremia 01/15/2018    Shortness of breath 04/28/2020    Shortness of breath 08/22/2024    Hypokalemia 08/25/2024    Hypomagnesemia 08/25/2024    Hypocalcemia 08/25/2024     Past Medical History:   Diagnosis Date    Aortic calcification     Aortic regurgitation     Asthma     Atrial fibrillation     CAD (coronary artery disease)     Chronic combined systolic  and diastolic congestive heart failure     CKD (chronic kidney disease) stage 3, GFR 30-59 ml/min     Coronary artery disease involving native coronary artery of native heart with angina pectoris     Disc degeneration, lumbar     Diverticulosis     DM type 2 (diabetes mellitus, type 2)     GERD (gastroesophageal reflux disease)     History of aneurysm     History of blood transfusion     History of fracture     History of heart attack     History of vitamin D deficiency     Hyperlipidemia     Hypertension     Leukemia     Mild mitral regurgitation     Mitral annular calcification     Osteopenia     PAF (paroxysmal atrial fibrillation)     Peripheral neuropathy     Skin cancer     Sleep apnea     SSS (sick sinus syndrome)     Stroke (cerebrum)     TIA (transient ischemic attack) 2017    Tricuspid regurgitation        PAST SURGICAL HISTORY  Past Surgical History:   Procedure Laterality Date    BRONCHOSCOPY Bilateral 10/6/2020    Procedure: BRONCHOSCOPY with BILATERAL LUNG washings;  Surgeon: Juno Coburn MD;  Location: [facility] ENDOSCOPY;  Service: Pulmonary;  Laterality: Bilateral;  PRE: purulent bronchitis  POST: PURULENT BRONCHITIS    BRONCHOSCOPY Bilateral 10/9/2020    Procedure: BRONCHOSCOPY with washing;  Surgeon: Juno Coburn MD;  Location: [facility] ENDOSCOPY;  Service: Pulmonary;  Laterality: Bilateral;  pre/post - mucous plug      CARDIAC CATHETERIZATION      CARDIAC ELECTROPHYSIOLOGY PROCEDURE N/A 2/7/2020    Procedure: PPM generator change - dual  medtronic;  Surgeon: Kiel Field MD;  Location: [facility] CATH INVASIVE LOCATION;  Service: Cardiology;  Laterality: N/A;    CHOLECYSTECTOMY      CORONARY STENT PLACEMENT      ENDOSCOPY N/A 9/14/2022    Procedure: ESOPHAGOGASTRODUODENOSCOPY with 54fr main dilatation;  Surgeon: Enio Cota MD;  Location: [facility] ENDOSCOPY;  Service: Gastroenterology;  Laterality: N/A;  pre - dysphagia  post - s/p dilatation, watermelon stomach    HERNIA REPAIR       hital hernia    HYSTERECTOMY      PACEMAKER IMPLANTATION      REPLACEMENT TOTAL KNEE Bilateral        FAMILY HISTORY  Family History   Problem Relation Age of Onset    Heart disease Mother     Hypertension Mother     Colon polyps Mother     Heart disease Father     Hypertension Father     Stroke Father     Hypertension Brother     Heart disease Brother     Diabetes Niece     Colon cancer Sister     Hypertension Sister     Hypertension Daughter     Hypertension Son     Hypertension Maternal Aunt     Hypertension Maternal Grandmother     Hypertension Maternal Grandfather     Hypertension Paternal Grandmother     Hypertension Paternal Grandfather        SOCIAL HISTORY  Social History     Socioeconomic History    Marital status: Single   Tobacco Use    Smoking status: Never     Passive exposure: Never    Smokeless tobacco: Never    Tobacco comments:     caffeine use- soda   Vaping Use    Vaping status: Never Used   Substance and Sexual Activity    Alcohol use: Never    Drug use: No    Sexual activity: Defer       ALLERGIES  Accupril [quinapril hcl], Ahist [chlorpheniramine], Clarithromycin, Esomeprazole, Latex, Levalbuterol, Levocetirizine, Lipitor [atorvastatin], Omeprazole, Pravachol [pravastatin], Sulindac, Valdecoxib, Chlorcyclizine, Chlorpheniramine-phenylephrine, Diclofenac sodium, Diphenhydramine, Lodine [etodolac], and Sulfa antibiotics    REVIEW OF SYSTEMS  Review of Systems  Included in HPI  All systems reviewed and negative except for those discussed in HPI.    PHYSICAL EXAM    I have reviewed the triage vital signs and nursing notes.    ED Triage Vitals   Temp Heart Rate Resp BP SpO2   02/07/25 1925 02/07/25 1925 02/07/25 1925 02/07/25 1951 02/07/25 1925   97 °F (36.1 °C) 96 18 154/79 93 %      Temp src Heart Rate Source Patient Position BP Location FiO2 (%)   02/07/25 1925 -- -- -- --   Tympanic           Physical Exam  Constitutional:       General: She is not in acute distress.     Appearance: Normal  appearance. She is not ill-appearing or toxic-appearing.   HENT:      Head: Normocephalic and atraumatic.      Nose: Nose normal.      Mouth/Throat:      Mouth: Mucous membranes are moist.      Pharynx: Oropharynx is clear.   Eyes:      Extraocular Movements: Extraocular movements intact.      Conjunctiva/sclera: Conjunctivae normal.      Pupils: Pupils are equal, round, and reactive to light.   Cardiovascular:      Rate and Rhythm: Normal rate and regular rhythm.      Pulses: Normal pulses.      Heart sounds: Normal heart sounds. No murmur heard.     No friction rub. No gallop.   Pulmonary:      Effort: Pulmonary effort is normal. No respiratory distress.      Breath sounds: Normal breath sounds. No stridor. No wheezing, rhonchi or rales.   Abdominal:      General: Abdomen is flat. There is no distension.      Palpations: Abdomen is soft.      Tenderness: There is abdominal tenderness (Left upper quadrant and epigastrium). There is no guarding or rebound.      Comments: No bruising, rashes or lesions over the left upper quadrant   Musculoskeletal:      Cervical back: Normal range of motion and neck supple.   Skin:     General: Skin is warm and dry.   Neurological:      General: No focal deficit present.      Mental Status: She is alert and oriented to person, place, and time. Mental status is at baseline.   Psychiatric:         Mood and Affect: Mood normal.         Behavior: Behavior normal.         Thought Content: Thought content normal.         Judgment: Judgment normal.         LAB RESULTS  Recent Results (from the past 24 hours)   Comprehensive Metabolic Panel    Collection Time: 02/07/25  8:17 PM    Specimen: Blood   Result Value Ref Range    Glucose 208 (H) 65 - 99 mg/dL    BUN 17 8 - 23 mg/dL    Creatinine 0.88 0.57 - 1.00 mg/dL    Sodium 139 136 - 145 mmol/L    Potassium 3.5 3.5 - 5.2 mmol/L    Chloride 105 98 - 107 mmol/L    CO2 25.2 22.0 - 29.0 mmol/L    Calcium 8.9 8.2 - 9.6 mg/dL    Total Protein 6.6  6.0 - 8.5 g/dL    Albumin 3.1 (L) 3.5 - 5.2 g/dL    ALT (SGPT) 14 1 - 33 U/L    AST (SGOT) 23 1 - 32 U/L    Alkaline Phosphatase 195 (H) 39 - 117 U/L    Total Bilirubin 0.7 0.0 - 1.2 mg/dL    Globulin 3.5 gm/dL    A/G Ratio 0.9 g/dL    BUN/Creatinine Ratio 19.3 7.0 - 25.0    Anion Gap 8.8 5.0 - 15.0 mmol/L    eGFR 62.5 >60.0 mL/min/1.73   Lipase    Collection Time: 02/07/25  8:17 PM    Specimen: Blood   Result Value Ref Range    Lipase 15 13 - 60 U/L   Protime-INR    Collection Time: 02/07/25  8:17 PM    Specimen: Blood   Result Value Ref Range    Protime 23.0 (H) 11.7 - 14.2 Seconds    INR 2.02 (H) 0.90 - 1.10   CBC Auto Differential    Collection Time: 02/07/25  8:17 PM    Specimen: Blood   Result Value Ref Range    WBC 10.14 3.40 - 10.80 10*3/mm3    RBC 3.69 (L) 3.77 - 5.28 10*6/mm3    Hemoglobin 11.8 (L) 12.0 - 15.9 g/dL    Hematocrit 35.7 34.0 - 46.6 %    MCV 96.7 79.0 - 97.0 fL    MCH 32.0 26.6 - 33.0 pg    MCHC 33.1 31.5 - 35.7 g/dL    RDW 13.7 12.3 - 15.4 %    RDW-SD 47.8 37.0 - 54.0 fl    MPV 10.9 6.0 - 12.0 fL    Platelets 181 140 - 450 10*3/mm3   Manual Differential    Collection Time: 02/07/25  8:17 PM    Specimen: Blood   Result Value Ref Range    Neutrophil % 42.0 (L) 42.7 - 76.0 %    Lymphocyte % 51.0 (H) 19.6 - 45.3 %    Monocyte % 4.0 (L) 5.0 - 12.0 %    Atypical Lymphocyte % 3.0 0.0 - 5.0 %    Neutrophils Absolute 4.26 1.70 - 7.00 10*3/mm3    Lymphocytes Absolute 5.48 (H) 0.70 - 3.10 10*3/mm3    Monocytes Absolute 0.41 0.10 - 0.90 10*3/mm3    Anisocytosis Slight/1+ None Seen    Poikilocytes Slight/1+ None Seen    Smudge Cells Slight/1+ None Seen    Platelet Estimate Adequate Normal    Giant Platelets Slight/1+ None Seen       RADIOLOGY  CT Abdomen Pelvis With Contrast    Result Date: 2/7/2025  CT  ABDOMEN & PELVIS WITH IV CONTRAST  INDICATION: Abdominal pain.  TECHNIQUE: CT of the abdomen and pelvis with  IV contrast. Coronal and sagittal reconstructions were obtained.  Radiation dose reduction  techniques were utilized, including automated exposure control, and exposure modulation based on body size.  COMPARISON: None available.  FINDINGS:  Lung bases: There is elevation of the left hemidiaphragm. There is medial right basilar coarse infiltrate versus atelectasis.  Abdomen: The gallbladder has been removed, and the liver appears otherwise normal. The spleen and pancreas appear normal.  Both adrenal glands are normal. There are renal simple cysts, but no acute abnormality is seen in either kidney. The aorta is normal in caliber.   There is no abdominal or retroperitoneal adenopathy or other mass. There is no abdominal or retroperitoneal inflammatory change, or abnormal fluid or air collection.  There is no evidence of bowel obstruction.   Pelvis: There is sigmoid diverticulosis, and there is marked sigmoid wall thickening and perisigmoid inflammatory change. FINDINGS most consistent with acute diverticulitis, without convincing evidence of abscess or bowel obstruction.  There is pronounced soft tissue swelling posterior to the lower sacrum, suggesting developing decubitus ulcer, but no convincing evidence is seen of subcutaneous loculated fluid collection or bone erosion.  There is osteopenia and there is extensive advanced chronic degenerative change, but there is no convincing evidence of acute bony abnormality.       Sigmoid diverticulosis with marked sigmoid wall thickening and perisigmoid inflammatory change, suggesting acute diverticulitis, without convincing evidence of abscess or bowel obstruction.  Marked soft tissue swelling dorsal to the lower sacrum, suggesting developing decubitus ulcer, correlate with clinical exam, no convincing evidence of subcutaneous fluid collection, or of bone erosion.     This report was finalized on 2/7/2025 10:24 PM by Dr. Dominick Garcia M.D on Workstation: [id number]       MEDICATIONS GIVEN IN ER  Medications   amoxicillin-clavulanate (AUGMENTIN) 875-125 MG per  tablet 1 tablet (has no administration in time range)   methocarbamol (ROBAXIN) tablet 750 mg (has no administration in time range)   Lidocaine 4 % 1 patch (has no administration in time range)   sodium chloride 0.9 % bolus 1,000 mL (0 mL Intravenous Stopped 2/7/25 2249)   HYDROcodone-acetaminophen (NORCO) 5-325 MG per tablet 1 tablet (1 tablet Oral Given 2/7/25 2018)   iopamidol (ISOVUE-300) 61 % injection 85 mL (85 mL Intravenous Given 2/7/25 2138)       ORDERS PLACED DURING THIS VISIT:  Orders Placed This Encounter   Procedures    CT Abdomen Pelvis With Contrast    Comprehensive Metabolic Panel    Lipase    Urinalysis With Culture If Indicated - Urine, Clean Catch    Protime-INR    CBC Auto Differential    Manual Differential    POC Protime / INR    CBC & Differential       OUTPATIENT MEDICATION MANAGEMENT:  Current Facility-Administered Medications Ordered in Epic   Medication Dose Route Frequency Provider Last Rate Last Admin    amoxicillin-clavulanate (AUGMENTIN) 875-125 MG per tablet 1 tablet  1 tablet Oral Once Johnnie Brownlee MD        Lidocaine 4 % 1 patch  1 patch Transdermal Once Johnnie Brownlee MD        methocarbamol (ROBAXIN) tablet 750 mg  750 mg Oral Once Johnnie Brownlee MD         Current Outpatient Medications Ordered in Epic   Medication Sig Dispense Refill    acetaminophen (TYLENOL) 325 MG tablet Take 2 tablets by mouth Every 4 (Four) Hours As Needed for Mild Pain .      Acetylcysteine (NAC PO) Take 1,000 mg by mouth 2 (Two) Times a Day.      albuterol (PROVENTIL) (2.5 MG/3ML) 0.083% nebulizer solution Take 2.5 mg by nebulization Every 4 (Four) Hours.      amoxicillin-clavulanate (AUGMENTIN) 875-125 MG per tablet Take 1 tablet by mouth 2 (Two) Times a Day for 7 days. 14 tablet 0    Benralizumab (FASENRA SC) Inject 1 dose under the skin into the appropriate area as directed Every 2 (Two) Months. Every 8 weeks      budesonide (PULMICORT) 0.5 MG/2ML nebulizer solution Take 2 mL by nebulization 2  (Two) Times a Day.      Calcium Carbonate-Vitamin D (calcium 500 mg vitamin D 5 mcg, 200 UT,) 500-5 MG-MCG tablet per tablet Take 1 tablet by mouth 2 (Two) Times a Day. 60 tablet 3    Cetirizine HCl 10 MG capsule Take 1 capsule by mouth Daily.      fluticasone (FLONASE) 50 MCG/ACT nasal spray Administer 1 spray into the nostril(s) as directed by provider Daily.      furosemide (LASIX) 20 MG tablet Take 1 tablet by mouth As Needed (For weight gain of greater than 2 pounds in 1 day or 5 pounds in 1 week). (Patient taking differently: Take 1 tablet by mouth Daily.) 30 tablet 11    glipizide (GLUCOTROL) 5 MG tablet Take 1 tablet by mouth Daily With Breakfast.      loperamide (IMODIUM) 2 MG capsule Take 1 capsule by mouth 4 (Four) Times a Day As Needed for Diarrhea.      melatonin 5 MG tablet tablet Take 1 tablet by mouth Every Night.      metFORMIN ER (GLUCOPHAGE-XR) 500 MG 24 hr tablet Take 1 tablet by mouth Daily With Breakfast.      methocarbamol (ROBAXIN) 750 MG tablet Take 1 tablet by mouth 3 (Three) Times a Day As Needed for Muscle Spasms. 21 tablet 0    metoprolol tartrate (LOPRESSOR) 25 MG tablet Take 0.5 tablets by mouth Every 12 (Twelve) Hours for 30 days. 30 tablet 0    montelukast (SINGULAIR) 10 MG tablet Take 1 tablet by mouth Every Night.      oxybutynin XL (DITROPAN-XL) 10 MG 24 hr tablet Take 1 tablet by mouth 2 (Two) Times a Day.      rosuvastatin (CRESTOR) 20 MG tablet Take 1 tablet by mouth Every Night.      warfarin (COUMADIN) 4 MG tablet Take one tablet by mouth on mon, wed, fri and take one-half of a tablet by mouth all other days or as directed 65 tablet 1       PROCEDURES  Procedures    PROGRESS, DATA ANALYSIS, CONSULTS, AND MEDICAL DECISION MAKING  All labs have been independently interpreted by me.  All radiology studies have been reviewed by me. All EKG's have been independently viewed and interpreted by me.  Discussion below represents my analysis of pertinent findings related to patient's  condition, differential diagnosis, treatment plan and final disposition.    Differential diagnosis includes but is not limited to musculoskeletal pain, pancreatitis, gastritis, splenic injury.    Clinical Scores:                   ED Course as of 02/07/25 2251   Fri Feb 07, 2025 2200 INR(!): 2.02 [AB]   2200 Glucose(!): 208 [AB]   2200 ALT (SGPT): 14 [AB]   2200 AST (SGOT): 23 [AB]   2200 Total Bilirubin: 0.7 [AB]   2200 WBC: 10.14 [AB]   2200 Hemoglobin(!): 11.8 [AB]   2200 Lipase: 15 [AB]   2200 CT Abdomen Pelvis With Contrast  My independent interpretation of the imaging study is very elevated left hemidiaphragm, uncomplicated diverticulitis [AB]   2241 Updated patient on results.  She is agreeable to discharge home.  Will give first dose of Augmentin here.  I do not specked the patient's abdominal pain in her left upper quadrant is related to diverticulitis.  Will prescribe Robaxin for pain relief.  I did look at the patient's back with regards to the possible ulceration seen on CT imaging.  There is no evidence of sacral ulcer on exam.  I informed her of the CT imaging results, and she suspects that they are secondary to a fall she had 2 weeks ago. [AB]      ED Course User Index  [AB] Johnnie Brownlee MD             AS OF 22:51 EST VITALS:    BP - 118/78  HR - 79  TEMP - 97 °F (36.1 °C) (Tympanic)  O2 SATS - 96%    COMPLEXITY OF CARE  Admission was considered but after careful review of the patient's presentation, physical examination, diagnostic results, and response to treatment the patient may be safely discharged with outpatient follow-up.      DIAGNOSIS  Final diagnoses:   Sigmoid diverticulitis   Left upper quadrant pain         DISPOSITION  ED Disposition       ED Disposition   Discharge    Condition   Stable    Comment   --                Please note that portions of this document were completed with a voice recognition program.    Note Disclaimer: At [facility], we believe that sharing  information builds trust and better relationships. You are receiving this note because you recently visited [facility]. It is possible you will see health information before a provider has talked with you about it. This kind of information can be easy to misunderstand. To help you fully understand what it means for your health, we urge you to discuss this note with your provider.         Johnnie Brownlee MD  02/07/25 4428

## 2025-02-12 ENCOUNTER — ANTICOAGULATION VISIT (OUTPATIENT)
Dept: PHARMACY | Facility: HOSPITAL | Age: OVER 89
End: 2025-02-12
Payer: COMMERCIAL

## 2025-02-12 DIAGNOSIS — I48.0 PAROXYSMAL ATRIAL FIBRILLATION: Primary | ICD-10-CM

## 2025-02-12 LAB — INR PPP: 1.9

## 2025-02-13 NOTE — PROGRESS NOTES
Anticoagulation Clinic Progress Note    Anticoagulation Summary  As of 2025      INR goal:  2.0-3.0   TTR:  65.3% (6.1 y)   INR used for dosin.90 (2025)   Warfarin maintenance plan:  4 mg every Wed, Sat; 2 mg all other days   Weekly warfarin total:  18 mg   Plan last modified:  Alexander Valiente, PharmD (2025)   Next INR check:  2025   Priority:  Maintenance   Target end date:  Indefinite    Indications    Paroxysmal atrial fibrillation [I48.0]                 Anticoagulation Episode Summary       INR check location:  --    Preferred lab:  --    Send INR reminders to:  [facility]  POOL    Comments:  [facility] beginning 20          Anticoagulation Care Providers       Provider Role Specialty Phone number    Jonah Regalado Jr., MD Referring Cardiology [phone]            Clinic Interview:  Patient Findings     Positives:  Change in health, Emergency department visit, Change in   medications    Negatives:  Signs/symptoms of thrombosis, Signs/symptoms of bleeding,   Laboratory test error suspected, Change in alcohol use, Change in   activity, Upcoming invasive procedure, Upcoming dental procedure, Missed   doses, Extra doses, Change in diet/appetite, Hospital admission, Bruising,   Other complaints    Comments:  ED visit 25 for sigmoid diverticulitis, for which she was   prescribed 7-day course of amox/clav. Her INR at the time of the ED visit   was 2.02.       Clinical Outcomes     Negatives:  Major bleeding event, Thromboembolic event,   Anticoagulation-related hospital admission, Anticoagulation-related ED   visit, Anticoagulation-related fatality    Comments:  ED visit 25 for sigmoid diverticulitis, for which she was   prescribed 7-day course of amox/clav. Her INR at the time of the ED visit   was 2.02.         INR History:      2025    10:23 AM 2025     4:00 PM 2/3/2025     3:44 PM 2025    11:00 AM 2025     8:17 PM 2025    12:00 AM  2/12/2025     3:54 PM   Anticoagulation Monitoring   INR 4.00 -- -- 1.7   1.90   INR Date 1/24/2025 2/5/2025 2/12/2025   INR Goal 2.0-3.0 2.0-3.0 2.0-3.0 2.0-3.0   2.0-3.0   Trend Same Same Same Down   Up   Last Week Total 18 mg 16 mg 14 mg 14 mg   16 mg   Next Week Total 16 mg 18 mg 18 mg 16 mg   18 mg   Sun - 2 mg - 2 mg   2 mg   Mon - - 2 mg (2/3) 2 mg   2 mg   Tue Hold (1/28) - 2 mg 2 mg   2 mg   Wed 2 mg (1/29) - - 4 mg   4 mg   Thu 2 mg - - 2 mg   2 mg   Fri - 2 mg (1/31) - 2 mg   2 mg   Sat - 2 mg - 2 mg   4 mg   Historical INR     2.02  1.90            This result is from an external source.       Plan:  1. INR is Subtherapeutic today- see above in Anticoagulation Summary.   Will instruct Caitlyn Longoria to Increase their warfarin regimen to 4 mg Wed/Sat, 2 mg all other days - see above in Anticoagulation Summary.  2. Follow up in 1 week at the [facility].   3. They have been instructed to call if any changes in medications, doses, concerns, etc. Patient expresses understanding and has no further questions at this time.    Alexander Valiente, PharmD

## 2025-02-14 ENCOUNTER — LAB REQUISITION (OUTPATIENT)
Dept: LAB | Facility: HOSPITAL | Age: OVER 89
End: 2025-02-14
Payer: COMMERCIAL

## 2025-02-14 DIAGNOSIS — B35.1 TINEA UNGUIUM: ICD-10-CM

## 2025-02-14 PROCEDURE — 88305 TISSUE EXAM BY PATHOLOGIST: CPT | Performed by: SURGERY

## 2025-02-14 PROCEDURE — 88360 TUMOR IMMUNOHISTOCHEM/MANUAL: CPT | Performed by: SURGERY

## 2025-02-14 PROCEDURE — 87070 CULTURE OTHR SPECIMN AEROBIC: CPT | Performed by: SURGERY

## 2025-02-14 PROCEDURE — 87205 SMEAR GRAM STAIN: CPT | Performed by: SURGERY

## 2025-02-14 PROCEDURE — 88312 SPECIAL STAINS GROUP 1: CPT | Performed by: SURGERY

## 2025-02-14 PROCEDURE — 87186 SC STD MICRODIL/AGAR DIL: CPT

## 2025-02-14 PROCEDURE — 87075 CULTR BACTERIA EXCEPT BLOOD: CPT

## 2025-02-14 PROCEDURE — 87015 SPECIMEN INFECT AGNT CONCNTJ: CPT | Performed by: SURGERY

## 2025-02-14 PROCEDURE — 87205 SMEAR GRAM STAIN: CPT

## 2025-02-14 PROCEDURE — 87075 CULTR BACTERIA EXCEPT BLOOD: CPT | Performed by: SURGERY

## 2025-02-14 PROCEDURE — 88311 DECALCIFY TISSUE: CPT | Performed by: SURGERY

## 2025-02-14 PROCEDURE — 87186 SC STD MICRODIL/AGAR DIL: CPT | Performed by: SURGERY

## 2025-02-17 ENCOUNTER — LAB REQUISITION (OUTPATIENT)
Dept: LAB | Facility: HOSPITAL | Age: OVER 89
End: 2025-02-17
Payer: COMMERCIAL

## 2025-02-17 DIAGNOSIS — M20.001 UNSPECIFIED DEFORMITY OF RIGHT FINGER(S): ICD-10-CM

## 2025-02-17 LAB
BACTERIA SPEC AEROBE CULT: ABNORMAL
BACTERIA SPEC AEROBE CULT: ABNORMAL
GRAM STN SPEC: ABNORMAL
GRAM STN SPEC: ABNORMAL

## 2025-02-19 LAB
BACTERIA SPEC ANAEROBE CULT: NORMAL
CYTO UR: NORMAL
LAB AP CASE REPORT: NORMAL
LAB AP DIAGNOSIS COMMENT: NORMAL
LAB AP INTRADEPARTMENTAL CONSULT: NORMAL
LAB AP SPECIAL STAINS: NORMAL
PATH REPORT.FINAL DX SPEC: NORMAL
PATH REPORT.GROSS SPEC: NORMAL

## 2025-02-20 ENCOUNTER — HOSPITAL ENCOUNTER (OUTPATIENT)
Facility: HOSPITAL | Age: OVER 89
Discharge: HOME | End: 2025-02-20
Admitting: SURGERY
Payer: MEDICARE

## 2025-02-20 ENCOUNTER — INFUSION (OUTPATIENT)
Dept: ONCOLOGY | Facility: HOSPITAL | Age: OVER 89
End: 2025-02-20
Payer: MEDICARE

## 2025-02-20 ENCOUNTER — OFFICE VISIT (OUTPATIENT)
Age: OVER 89
End: 2025-02-20
Payer: MEDICARE

## 2025-02-20 VITALS
WEIGHT: 173 LBS | BODY MASS INDEX: 30.65 KG/M2 | DIASTOLIC BLOOD PRESSURE: 77 MMHG | HEIGHT: 63 IN | SYSTOLIC BLOOD PRESSURE: 105 MMHG

## 2025-02-20 VITALS
TEMPERATURE: 97.3 F | WEIGHT: 175.8 LBS | BODY MASS INDEX: 31.14 KG/M2 | SYSTOLIC BLOOD PRESSURE: 89 MMHG | RESPIRATION RATE: 18 BRPM | HEART RATE: 83 BPM | DIASTOLIC BLOOD PRESSURE: 60 MMHG

## 2025-02-20 DIAGNOSIS — I65.23 BILATERAL CAROTID ARTERY STENOSIS: ICD-10-CM

## 2025-02-20 DIAGNOSIS — C91.10 CLL (CHRONIC LYMPHOCYTIC LEUKEMIA): ICD-10-CM

## 2025-02-20 DIAGNOSIS — C91.10 CLL (CHRONIC LYMPHOCYTIC LEUKEMIA): Primary | ICD-10-CM

## 2025-02-20 DIAGNOSIS — I65.23 CAROTID STENOSIS, BILATERAL: ICD-10-CM

## 2025-02-20 DIAGNOSIS — C91.12 CLL (CHRONIC LYMPHOID LEUKEMIA) IN RELAPSE: Primary | ICD-10-CM

## 2025-02-20 DIAGNOSIS — I65.23 BILATERAL CAROTID ARTERY STENOSIS: Primary | ICD-10-CM

## 2025-02-20 LAB
BASOPHILS # BLD AUTO: 0.02 10*3/MM3 (ref 0–0.2)
BASOPHILS NFR BLD AUTO: 0.2 % (ref 0–1.5)
BH CV XLRA MEAS LEFT CAROTID BULB EDV: 30 CM/SEC
BH CV XLRA MEAS LEFT CAROTID BULB PSV: 165 CM/SEC
BH CV XLRA MEAS LEFT DIST CCA EDV: -22 CM/SEC
BH CV XLRA MEAS LEFT DIST CCA PSV: -151.6 CM/SEC
BH CV XLRA MEAS LEFT DIST ICA EDV: -20.4 CM/SEC
BH CV XLRA MEAS LEFT DIST ICA PSV: -113.1 CM/SEC
BH CV XLRA MEAS LEFT ICA/CCA RATIO: 2.09
BH CV XLRA MEAS LEFT MID CCA EDV: 14.1 CM/SEC
BH CV XLRA MEAS LEFT MID CCA PSV: 79.7 CM/SEC
BH CV XLRA MEAS LEFT MID ICA EDV: -31.4 CM/SEC
BH CV XLRA MEAS LEFT MID ICA PSV: -99 CM/SEC
BH CV XLRA MEAS LEFT PROX CCA EDV: 15.8 CM/SEC
BH CV XLRA MEAS LEFT PROX CCA PSV: 85 CM/SEC
BH CV XLRA MEAS LEFT PROX ECA EDV: -23.6 CM/SEC
BH CV XLRA MEAS LEFT PROX ECA PSV: -204.3 CM/SEC
BH CV XLRA MEAS LEFT PROX ICA EDV: 30 CM/SEC
BH CV XLRA MEAS LEFT PROX ICA PSV: 165 CM/SEC
BH CV XLRA MEAS LEFT PROX SCLA PSV: 201.7 CM/SEC
BH CV XLRA MEAS LEFT VERTEBRAL A EDV: 8 CM/SEC
BH CV XLRA MEAS LEFT VERTEBRAL A PSV: 51 CM/SEC
BH CV XLRA MEAS RIGHT CAROTID BULB EDV: 13 CM/SEC
BH CV XLRA MEAS RIGHT CAROTID BULB PSV: 70 CM/SEC
BH CV XLRA MEAS RIGHT DIST CCA EDV: -12.9 CM/SEC
BH CV XLRA MEAS RIGHT DIST CCA PSV: -67.4 CM/SEC
BH CV XLRA MEAS RIGHT DIST ICA EDV: 21 CM/SEC
BH CV XLRA MEAS RIGHT DIST ICA PSV: 95 CM/SEC
BH CV XLRA MEAS RIGHT ICA/CCA RATIO: 1.43
BH CV XLRA MEAS RIGHT MID CCA EDV: 12.3 CM/SEC
BH CV XLRA MEAS RIGHT MID CCA PSV: 63.3 CM/SEC
BH CV XLRA MEAS RIGHT MID ICA EDV: 23 CM/SEC
BH CV XLRA MEAS RIGHT MID ICA PSV: 96 CM/SEC
BH CV XLRA MEAS RIGHT PROX CCA EDV: 11.1 CM/SEC
BH CV XLRA MEAS RIGHT PROX CCA PSV: 73.3 CM/SEC
BH CV XLRA MEAS RIGHT PROX ECA EDV: 9.8 CM/SEC
BH CV XLRA MEAS RIGHT PROX ECA PSV: 97.2 CM/SEC
BH CV XLRA MEAS RIGHT PROX ICA EDV: 16 CM/SEC
BH CV XLRA MEAS RIGHT PROX ICA PSV: 67 CM/SEC
BH CV XLRA MEAS RIGHT PROX SCLA PSV: 79.2 CM/SEC
BH CV XLRA MEAS RIGHT VERTEBRAL A EDV: 10.6 CM/SEC
BH CV XLRA MEAS RIGHT VERTEBRAL A PSV: 44 CM/SEC
DEPRECATED RDW RBC AUTO: 58.9 FL (ref 37–54)
EOSINOPHIL # BLD AUTO: 0 10*3/MM3 (ref 0–0.4)
EOSINOPHIL NFR BLD AUTO: 0 % (ref 0.3–6.2)
ERYTHROCYTE [DISTWIDTH] IN BLOOD BY AUTOMATED COUNT: 15.2 % (ref 12.3–15.4)
HCT VFR BLD AUTO: 42.4 % (ref 34–46.6)
HGB BLD-MCNC: 12.8 G/DL (ref 12–15.9)
IGA1 MFR SER: <50 MG/DL (ref 70–400)
IGG1 SER-MCNC: 963 MG/DL (ref 700–1600)
IGM SERPL-MCNC: 25 MG/DL (ref 40–230)
IMM GRANULOCYTES # BLD AUTO: 0.02 10*3/MM3 (ref 0–0.05)
IMM GRANULOCYTES NFR BLD AUTO: 0.2 % (ref 0–0.5)
LEFT ARM BP: NORMAL MMHG
LYMPHOCYTES # BLD AUTO: 6.47 10*3/MM3 (ref 0.7–3.1)
LYMPHOCYTES NFR BLD AUTO: 58.4 % (ref 19.6–45.3)
MCH RBC QN AUTO: 31.2 PG (ref 26.6–33)
MCHC RBC AUTO-ENTMCNC: 30.2 G/DL (ref 31.5–35.7)
MCV RBC AUTO: 103.4 FL (ref 79–97)
MONOCYTES # BLD AUTO: 0.18 10*3/MM3 (ref 0.1–0.9)
MONOCYTES NFR BLD AUTO: 1.6 % (ref 5–12)
NEUTROPHILS NFR BLD AUTO: 39.6 % (ref 42.7–76)
NEUTROPHILS NFR BLD AUTO: 4.38 10*3/MM3 (ref 1.7–7)
NRBC BLD AUTO-RTO: 0 /100 WBC (ref 0–0.2)
PLATELET # BLD AUTO: 201 10*3/MM3 (ref 140–450)
PMV BLD AUTO: 9.9 FL (ref 6–12)
RBC # BLD AUTO: 4.1 10*6/MM3 (ref 3.77–5.28)
RIGHT ARM BP: NORMAL MMHG
WBC NRBC COR # BLD AUTO: 11.07 10*3/MM3 (ref 3.4–10.8)

## 2025-02-20 PROCEDURE — 85025 COMPLETE CBC W/AUTO DIFF WBC: CPT

## 2025-02-20 PROCEDURE — 96366 THER/PROPH/DIAG IV INF ADDON: CPT

## 2025-02-20 PROCEDURE — 82784 ASSAY IGA/IGD/IGG/IGM EACH: CPT | Performed by: NURSE PRACTITIONER

## 2025-02-20 PROCEDURE — 63710000001 ACETAMINOPHEN 325 MG TABLET: Performed by: NURSE PRACTITIONER

## 2025-02-20 PROCEDURE — A9270 NON-COVERED ITEM OR SERVICE: HCPCS | Performed by: NURSE PRACTITIONER

## 2025-02-20 PROCEDURE — 96365 THER/PROPH/DIAG IV INF INIT: CPT

## 2025-02-20 PROCEDURE — 63710000001 HYDROXYZINE PAMOATE PER 25 MG: Performed by: NURSE PRACTITIONER

## 2025-02-20 PROCEDURE — 93880 EXTRACRANIAL BILAT STUDY: CPT

## 2025-02-20 PROCEDURE — 25010000002 IMMUNE GLOBULIN (HUMAN) 30 GM/300ML SOLUTION: Performed by: NURSE PRACTITIONER

## 2025-02-20 RX ORDER — ACETAMINOPHEN 325 MG/1
650 TABLET ORAL ONCE
Status: COMPLETED | OUTPATIENT
Start: 2025-02-20 | End: 2025-02-20

## 2025-02-20 RX ORDER — HYDROXYZINE PAMOATE 25 MG/1
25 CAPSULE ORAL ONCE
Status: COMPLETED | OUTPATIENT
Start: 2025-02-20 | End: 2025-02-20

## 2025-02-20 RX ORDER — FAMOTIDINE 10 MG/ML
20 INJECTION, SOLUTION INTRAVENOUS AS NEEDED
Status: CANCELLED | OUTPATIENT
Start: 2025-02-20

## 2025-02-20 RX ORDER — ACETAMINOPHEN 325 MG/1
650 TABLET ORAL ONCE
Status: CANCELLED | OUTPATIENT
Start: 2025-02-20

## 2025-02-20 RX ORDER — DIPHENHYDRAMINE HYDROCHLORIDE 50 MG/ML
50 INJECTION INTRAMUSCULAR; INTRAVENOUS AS NEEDED
Status: CANCELLED | OUTPATIENT
Start: 2025-02-20

## 2025-02-20 RX ORDER — HYDROXYZINE PAMOATE 25 MG/1
25 CAPSULE ORAL ONCE
Status: CANCELLED | OUTPATIENT
Start: 2025-02-20

## 2025-02-20 RX ORDER — SODIUM CHLORIDE 9 MG/ML
250 INJECTION, SOLUTION INTRAVENOUS ONCE
Status: CANCELLED | OUTPATIENT
Start: 2025-02-20

## 2025-02-20 RX ADMIN — ACETAMINOPHEN 650 MG: 325 TABLET ORAL at 11:14

## 2025-02-20 RX ADMIN — IMMUNE GLOBULIN INFUSION (HUMAN) 30 G: 100 INJECTION, SOLUTION INTRAVENOUS; SUBCUTANEOUS at 11:31

## 2025-02-20 RX ADMIN — HYDROXYZINE PAMOATE 25 MG: 25 CAPSULE ORAL at 11:14

## 2025-02-20 NOTE — PROGRESS NOTES
Chief Complaint  Carotid Artery Disease    Subjective        Caitlyn Longoria presents to [facility]ERY  HPI   Caitlyn Longoria is a 89 y.o. female that has been followed in our office for carotid artery stenosis. She returns today in follow up along with a carotid duplex. She  reports she has been doing well without hospitalizations or surgeries. She denies any symptoms consistent with CVA, TIA, or amaurosis fugax.     Review of Systems   Constitutional:  Negative for fever.   Eyes:  Negative for visual disturbance.   Cardiovascular:  Negative for leg swelling.   Gastrointestinal:  Negative for abdominal pain.   Musculoskeletal:  Negative for back pain.   Skin:  Negative for color change, pallor and wound.   Neurological:  Negative for dizziness, facial asymmetry, speech difficulty and weakness.        Caitlyn Longoria  reports that she has never smoked. She has never been exposed to tobacco smoke. She has never used smokeless tobacco..        Objective   Vital Signs:  Vitals:    02/20/25 1013   BP: 105/77      Body mass index is 30.65 kg/m².   BMI is >= 30 and <35. (Class 1 Obesity). The following options were offered after discussion;: information on healthy weight added to patient's after visit summary        Physical Exam  Vitals reviewed.   Constitutional:       Appearance: Normal appearance.   HENT:      Head: Normocephalic.   Cardiovascular:      Rate and Rhythm: Normal rate and regular rhythm.      Pulses: Normal pulses.           Dorsalis pedis pulses are 3+ on the right side and 3+ on the left side.        Posterior tibial pulses are 3+ on the right side and 3+ on the left side.   Pulmonary:      Effort: Pulmonary effort is normal.   Skin:     General: Skin is warm.   Neurological:      General: No focal deficit present.      Mental Status: She is alert and oriented to person, place, and time.   Psychiatric:         Mood and Affect: Mood normal.          Result Review  :      Previous carotid duplex: Less than 50% stenosis bilaterally    Carotid duplex from today: Duplex Carotid Ultrasound CAR (02/20/2025 10:05)                    Assessment and Plan     Diagnoses and all orders for this visit:    1. Bilateral carotid artery stenosis (Primary)    2. Carotid stenosis, bilateral  -     Duplex Carotid Ultrasound CAR; Future             Patient present today for follow up of carotid artery stenosis. She is to continue her warfarin.  She is on a statin for cholesterol control.  We discussed adequate blood pressure control. She will return in 2 years along with a repeat carotid artery duplex.    Follow Up     Return in about 1 year (around 2/20/2026) for carotid duplex.  Patient was given instructions and counseling regarding her condition or for health maintenance advice. Please see specific information pulled into the AVS if appropriate.     KRISTIN Park

## 2025-02-21 ENCOUNTER — ANTICOAGULATION VISIT (OUTPATIENT)
Dept: PHARMACY | Facility: HOSPITAL | Age: OVER 89
End: 2025-02-21
Payer: COMMERCIAL

## 2025-02-21 DIAGNOSIS — I48.0 PAROXYSMAL ATRIAL FIBRILLATION: Primary | ICD-10-CM

## 2025-02-21 LAB — INR PPP: 1.8

## 2025-02-21 PROCEDURE — 93296 REM INTERROG EVL PM/IDS: CPT | Performed by: INTERNAL MEDICINE

## 2025-02-21 PROCEDURE — 93294 REM INTERROG EVL PM/LDLS PM: CPT | Performed by: INTERNAL MEDICINE

## 2025-02-21 NOTE — PROGRESS NOTES
Anticoagulation Clinic Progress Note    Anticoagulation Summary  As of 2025      INR goal:  2.0-3.0   TTR:  65.1% (6.1 y)   INR used for dosin.80 (2025)   Warfarin maintenance plan:  4 mg every Mon, Wed, Fri; 2 mg all other days   Weekly warfarin total:  20 mg   Plan last modified:  Alexander Valiente, PharmD (2025)   Next INR check:  2025   Priority:  Maintenance   Target end date:  Indefinite    Indications    Paroxysmal atrial fibrillation [I48.0]                 Anticoagulation Episode Summary       INR check location:  --    Preferred lab:  --    Send INR reminders to:   JACEY SINCLAIR  POOL    Comments:  [facility] beginning 20          Anticoagulation Care Providers       Provider Role Specialty Phone number    Jonah Regalado Jr., MD Referring Cardiology [phone]            Clinic Interview:  Patient Findings     Negatives:  Signs/symptoms of thrombosis, Signs/symptoms of bleeding,   Laboratory test error suspected, Change in health, Change in alcohol use,   Change in activity, Upcoming invasive procedure, Emergency department   visit, Upcoming dental procedure, Missed doses, Extra doses, Change in   medications, Change in diet/appetite, Hospital admission, Bruising, Other   complaints      Clinical Outcomes     Negatives:  Major bleeding event, Thromboembolic event,   Anticoagulation-related hospital admission, Anticoagulation-related ED   visit, Anticoagulation-related fatality        INR History:      2/3/2025     3:44 PM 2025    11:00 AM 2025     8:17 PM 2025    12:00 AM 2025     3:54 PM 2025    12:00 AM 2025     9:22 AM   Anticoagulation Monitoring   INR -- 1.7   1.90  1.80   INR Date  2025   INR Goal 2.0-3.0 2.0-3.0   2.0-3.0  2.0-3.0   Trend Same Down   Up  Up   Last Week Total 14 mg 14 mg   16 mg  18 mg   Next Week Total 18 mg 16 mg   18 mg  20 mg   Sun - 2 mg   2 mg  2 mg   Mon 2 mg (2/3) 2 mg   2 mg  4  mg   Tue 2 mg 2 mg   2 mg  2 mg   Wed - 4 mg   4 mg  4 mg   Thu - 2 mg   2 mg  2 mg   Fri - 2 mg   2 mg  4 mg   Sat - 2 mg   4 mg  2 mg   Historical INR   2.02  1.90      1.80            This result is from an external source.       Plan:  1. INR is Subtherapeutic today- see above in Anticoagulation Summary.   Will instruct Caitlyn Longoria to Increase their warfarin regimen to 4 mg MWF, 2 mg all other days - see above in Anticoagulation Summary.  2. Follow up in 1 week. Will monitor INR closely, as this is the same total weekly dose that she experienced an INR >8.0 in January of this year.   3. They have been instructed to call if any changes in medications, doses, concerns, etc. Patient expresses understanding and has no further questions at this time.    Alexander Valiente, PharmD

## 2025-02-25 ENCOUNTER — APPOINTMENT (OUTPATIENT)
Dept: GENERAL RADIOLOGY | Facility: HOSPITAL | Age: OVER 89
End: 2025-02-25
Payer: MEDICARE

## 2025-02-25 ENCOUNTER — HOSPITAL ENCOUNTER (OUTPATIENT)
Facility: HOSPITAL | Age: OVER 89
Setting detail: OBSERVATION
Discharge: HOME | End: 2025-02-27
Attending: EMERGENCY MEDICINE | Admitting: STUDENT IN AN ORGANIZED HEALTH CARE EDUCATION/TRAINING PROGRAM
Payer: MEDICARE

## 2025-02-25 ENCOUNTER — TELEPHONE (OUTPATIENT)
Dept: CARDIOLOGY | Facility: CLINIC | Age: OVER 89
End: 2025-02-25
Payer: COMMERCIAL

## 2025-02-25 DIAGNOSIS — B34.2 CORONAVIRUS INFECTION: Primary | ICD-10-CM

## 2025-02-25 DIAGNOSIS — J44.1 ACUTE EXACERBATION OF CHRONIC OBSTRUCTIVE PULMONARY DISEASE (COPD): ICD-10-CM

## 2025-02-25 DIAGNOSIS — R54 AGE-RELATED PHYSICAL DEBILITY: ICD-10-CM

## 2025-02-25 LAB
ALBUMIN SERPL-MCNC: 3.6 G/DL (ref 3.5–5.2)
ALBUMIN/GLOB SERPL: 1.1 G/DL
ALP SERPL-CCNC: 179 U/L (ref 39–117)
ALT SERPL W P-5'-P-CCNC: 15 U/L (ref 1–33)
ANION GAP SERPL CALCULATED.3IONS-SCNC: 11.6 MMOL/L (ref 5–15)
AST SERPL-CCNC: 28 U/L (ref 1–32)
B PARAPERT DNA SPEC QL NAA+PROBE: NOT DETECTED
B PERT DNA SPEC QL NAA+PROBE: NOT DETECTED
BILIRUB SERPL-MCNC: 0.4 MG/DL (ref 0–1.2)
BUN SERPL-MCNC: 19 MG/DL (ref 8–23)
BUN/CREAT SERPL: 19 (ref 7–25)
C PNEUM DNA NPH QL NAA+NON-PROBE: NOT DETECTED
CALCIUM SPEC-SCNC: 8.9 MG/DL (ref 8.2–9.6)
CHLORIDE SERPL-SCNC: 106 MMOL/L (ref 98–107)
CO2 SERPL-SCNC: 26.4 MMOL/L (ref 22–29)
CREAT SERPL-MCNC: 1 MG/DL (ref 0.57–1)
DEPRECATED RDW RBC AUTO: 50.3 FL (ref 37–54)
EGFRCR SERPLBLD CKD-EPI 2021: 53.6 ML/MIN/1.73
ERYTHROCYTE [DISTWIDTH] IN BLOOD BY AUTOMATED COUNT: 13.6 % (ref 12.3–15.4)
FLUAV SUBTYP SPEC NAA+PROBE: NOT DETECTED
FLUBV RNA ISLT QL NAA+PROBE: NOT DETECTED
GEN 5 1HR TROPONIN T REFLEX: 31 NG/L
GLOBULIN UR ELPH-MCNC: 3.2 GM/DL
GLUCOSE SERPL-MCNC: 106 MG/DL (ref 65–99)
HADV DNA SPEC NAA+PROBE: NOT DETECTED
HCOV 229E RNA SPEC QL NAA+PROBE: NOT DETECTED
HCOV HKU1 RNA SPEC QL NAA+PROBE: DETECTED
HCOV NL63 RNA SPEC QL NAA+PROBE: NOT DETECTED
HCOV OC43 RNA SPEC QL NAA+PROBE: NOT DETECTED
HCT VFR BLD AUTO: 40.2 % (ref 34–46.6)
HGB BLD-MCNC: 12.7 G/DL (ref 12–15.9)
HMPV RNA NPH QL NAA+NON-PROBE: NOT DETECTED
HPIV1 RNA ISLT QL NAA+PROBE: NOT DETECTED
HPIV2 RNA SPEC QL NAA+PROBE: NOT DETECTED
HPIV3 RNA NPH QL NAA+PROBE: NOT DETECTED
HPIV4 P GENE NPH QL NAA+PROBE: NOT DETECTED
INR PPP: 1.6 (ref 0.9–1.1)
LYMPHOCYTES # BLD MANUAL: 5.92 10*3/MM3 (ref 0.7–3.1)
LYMPHOCYTES NFR BLD MANUAL: 4 % (ref 5–12)
M PNEUMO IGG SER IA-ACNC: NOT DETECTED
MCH RBC QN AUTO: 31.7 PG (ref 26.6–33)
MCHC RBC AUTO-ENTMCNC: 31.6 G/DL (ref 31.5–35.7)
MCV RBC AUTO: 100.2 FL (ref 79–97)
MONOCYTES # BLD: 0.37 10*3/MM3 (ref 0.1–0.9)
NEUTROPHILS # BLD AUTO: 2.96 10*3/MM3 (ref 1.7–7)
NEUTROPHILS NFR BLD MANUAL: 32 % (ref 42.7–76)
NRBC BLD AUTO-RTO: 0 /100 WBC (ref 0–0.2)
NT-PROBNP SERPL-MCNC: 601 PG/ML (ref 0–1800)
PLAT MORPH BLD: NORMAL
PLATELET # BLD AUTO: 135 10*3/MM3 (ref 140–450)
PMV BLD AUTO: 10.1 FL (ref 6–12)
POTASSIUM SERPL-SCNC: 3.6 MMOL/L (ref 3.5–5.2)
PROT SERPL-MCNC: 6.8 G/DL (ref 6–8.5)
PROTHROMBIN TIME: 19.1 SECONDS (ref 11.7–14.2)
RBC # BLD AUTO: 4.01 10*6/MM3 (ref 3.77–5.28)
RBC MORPH BLD: NORMAL
RHINOVIRUS RNA SPEC NAA+PROBE: NOT DETECTED
RSV RNA NPH QL NAA+NON-PROBE: NOT DETECTED
SARS-COV-2 RNA NPH QL NAA+NON-PROBE: NOT DETECTED
SODIUM SERPL-SCNC: 144 MMOL/L (ref 136–145)
TROPONIN T % DELTA: 3
TROPONIN T NUMERIC DELTA: 1 NG/L
TROPONIN T SERPL HS-MCNC: 30 NG/L
VARIANT LYMPHS NFR BLD MANUAL: 64 % (ref 19.6–45.3)
WBC MORPH BLD: NORMAL
WBC NRBC COR # BLD AUTO: 9.25 10*3/MM3 (ref 3.4–10.8)

## 2025-02-25 PROCEDURE — 93010 ELECTROCARDIOGRAM REPORT: CPT | Performed by: STUDENT IN AN ORGANIZED HEALTH CARE EDUCATION/TRAINING PROGRAM

## 2025-02-25 PROCEDURE — 25010000002 METHYLPREDNISOLONE PER 125 MG: Performed by: EMERGENCY MEDICINE

## 2025-02-25 PROCEDURE — 93005 ELECTROCARDIOGRAM TRACING: CPT

## 2025-02-25 PROCEDURE — 96374 THER/PROPH/DIAG INJ IV PUSH: CPT

## 2025-02-25 PROCEDURE — 94799 UNLISTED PULMONARY SVC/PX: CPT

## 2025-02-25 PROCEDURE — 94640 AIRWAY INHALATION TREATMENT: CPT

## 2025-02-25 PROCEDURE — 85007 BL SMEAR W/DIFF WBC COUNT: CPT | Performed by: EMERGENCY MEDICINE

## 2025-02-25 PROCEDURE — 71045 X-RAY EXAM CHEST 1 VIEW: CPT

## 2025-02-25 PROCEDURE — G0378 HOSPITAL OBSERVATION PER HR: HCPCS

## 2025-02-25 PROCEDURE — 85025 COMPLETE CBC W/AUTO DIFF WBC: CPT | Performed by: EMERGENCY MEDICINE

## 2025-02-25 PROCEDURE — 93005 ELECTROCARDIOGRAM TRACING: CPT | Performed by: EMERGENCY MEDICINE

## 2025-02-25 PROCEDURE — 80053 COMPREHEN METABOLIC PANEL: CPT | Performed by: EMERGENCY MEDICINE

## 2025-02-25 PROCEDURE — 84484 ASSAY OF TROPONIN QUANT: CPT | Performed by: EMERGENCY MEDICINE

## 2025-02-25 PROCEDURE — 99285 EMERGENCY DEPT VISIT HI MDM: CPT

## 2025-02-25 PROCEDURE — 83880 ASSAY OF NATRIURETIC PEPTIDE: CPT | Performed by: EMERGENCY MEDICINE

## 2025-02-25 PROCEDURE — 36415 COLL VENOUS BLD VENIPUNCTURE: CPT | Performed by: EMERGENCY MEDICINE

## 2025-02-25 PROCEDURE — 85610 PROTHROMBIN TIME: CPT | Performed by: EMERGENCY MEDICINE

## 2025-02-25 PROCEDURE — 0202U NFCT DS 22 TRGT SARS-COV-2: CPT | Performed by: EMERGENCY MEDICINE

## 2025-02-25 RX ORDER — POLYETHYLENE GLYCOL 3350 17 G/17G
17 POWDER, FOR SOLUTION ORAL DAILY PRN
Status: DISCONTINUED | OUTPATIENT
Start: 2025-02-25 | End: 2025-02-27 | Stop reason: HOSPADM

## 2025-02-25 RX ORDER — SODIUM CHLORIDE 0.9 % (FLUSH) 0.9 %
10 SYRINGE (ML) INJECTION AS NEEDED
Status: DISCONTINUED | OUTPATIENT
Start: 2025-02-25 | End: 2025-02-26

## 2025-02-25 RX ORDER — ACETAMINOPHEN 160 MG/5ML
650 SOLUTION ORAL EVERY 4 HOURS PRN
Status: DISCONTINUED | OUTPATIENT
Start: 2025-02-25 | End: 2025-02-26

## 2025-02-25 RX ORDER — METHYLPREDNISOLONE SODIUM SUCCINATE 125 MG/2ML
60 INJECTION, POWDER, LYOPHILIZED, FOR SOLUTION INTRAMUSCULAR; INTRAVENOUS EVERY 12 HOURS
Status: DISCONTINUED | OUTPATIENT
Start: 2025-02-26 | End: 2025-02-27

## 2025-02-25 RX ORDER — BISACODYL 5 MG/1
5 TABLET, DELAYED RELEASE ORAL DAILY PRN
Status: DISCONTINUED | OUTPATIENT
Start: 2025-02-25 | End: 2025-02-27 | Stop reason: HOSPADM

## 2025-02-25 RX ORDER — IBUPROFEN 600 MG/1
1 TABLET ORAL
Status: DISCONTINUED | OUTPATIENT
Start: 2025-02-25 | End: 2025-02-27 | Stop reason: HOSPADM

## 2025-02-25 RX ORDER — ACETAMINOPHEN 650 MG/1
650 SUPPOSITORY RECTAL EVERY 4 HOURS PRN
Status: DISCONTINUED | OUTPATIENT
Start: 2025-02-25 | End: 2025-02-26

## 2025-02-25 RX ORDER — SODIUM CHLORIDE 0.9 % (FLUSH) 0.9 %
10 SYRINGE (ML) INJECTION EVERY 12 HOURS SCHEDULED
Status: DISCONTINUED | OUTPATIENT
Start: 2025-02-25 | End: 2025-02-26

## 2025-02-25 RX ORDER — FAMOTIDINE 20 MG/1
20 TABLET, FILM COATED ORAL 2 TIMES DAILY PRN
Status: DISCONTINUED | OUTPATIENT
Start: 2025-02-25 | End: 2025-02-27 | Stop reason: HOSPADM

## 2025-02-25 RX ORDER — IPRATROPIUM BROMIDE AND ALBUTEROL SULFATE 2.5; .5 MG/3ML; MG/3ML
3 SOLUTION RESPIRATORY (INHALATION)
Status: DISCONTINUED | OUTPATIENT
Start: 2025-02-25 | End: 2025-02-27 | Stop reason: HOSPADM

## 2025-02-25 RX ORDER — NICOTINE POLACRILEX 4 MG
15 LOZENGE BUCCAL
Status: DISCONTINUED | OUTPATIENT
Start: 2025-02-25 | End: 2025-02-27 | Stop reason: HOSPADM

## 2025-02-25 RX ORDER — DEXTROSE MONOHYDRATE 25 G/50ML
25 INJECTION, SOLUTION INTRAVENOUS
Status: DISCONTINUED | OUTPATIENT
Start: 2025-02-25 | End: 2025-02-27 | Stop reason: HOSPADM

## 2025-02-25 RX ORDER — AMOXICILLIN 250 MG
2 CAPSULE ORAL 2 TIMES DAILY PRN
Status: DISCONTINUED | OUTPATIENT
Start: 2025-02-25 | End: 2025-02-27 | Stop reason: HOSPADM

## 2025-02-25 RX ORDER — NITROGLYCERIN 0.4 MG/1
0.4 TABLET SUBLINGUAL
Status: DISCONTINUED | OUTPATIENT
Start: 2025-02-25 | End: 2025-02-27 | Stop reason: HOSPADM

## 2025-02-25 RX ORDER — ACETAMINOPHEN 325 MG/1
650 TABLET ORAL EVERY 4 HOURS PRN
Status: DISCONTINUED | OUTPATIENT
Start: 2025-02-25 | End: 2025-02-26

## 2025-02-25 RX ORDER — ONDANSETRON 2 MG/ML
4 INJECTION INTRAMUSCULAR; INTRAVENOUS EVERY 6 HOURS PRN
Status: DISCONTINUED | OUTPATIENT
Start: 2025-02-25 | End: 2025-02-27 | Stop reason: HOSPADM

## 2025-02-25 RX ORDER — SODIUM CHLORIDE 9 MG/ML
40 INJECTION, SOLUTION INTRAVENOUS AS NEEDED
Status: DISCONTINUED | OUTPATIENT
Start: 2025-02-25 | End: 2025-02-26

## 2025-02-25 RX ORDER — INSULIN LISPRO 100 [IU]/ML
2-7 INJECTION, SOLUTION INTRAVENOUS; SUBCUTANEOUS
Status: DISCONTINUED | OUTPATIENT
Start: 2025-02-25 | End: 2025-02-27 | Stop reason: HOSPADM

## 2025-02-25 RX ORDER — IPRATROPIUM BROMIDE AND ALBUTEROL SULFATE 2.5; .5 MG/3ML; MG/3ML
3 SOLUTION RESPIRATORY (INHALATION) ONCE
Status: COMPLETED | OUTPATIENT
Start: 2025-02-25 | End: 2025-02-25

## 2025-02-25 RX ORDER — METHYLPREDNISOLONE SODIUM SUCCINATE 125 MG/2ML
125 INJECTION, POWDER, LYOPHILIZED, FOR SOLUTION INTRAMUSCULAR; INTRAVENOUS ONCE
Status: COMPLETED | OUTPATIENT
Start: 2025-02-25 | End: 2025-02-25

## 2025-02-25 RX ORDER — BISACODYL 10 MG
10 SUPPOSITORY, RECTAL RECTAL DAILY PRN
Status: DISCONTINUED | OUTPATIENT
Start: 2025-02-25 | End: 2025-02-27 | Stop reason: HOSPADM

## 2025-02-25 RX ADMIN — METHYLPREDNISOLONE SODIUM SUCCINATE 125 MG: 125 INJECTION, POWDER, FOR SOLUTION INTRAMUSCULAR; INTRAVENOUS at 21:31

## 2025-02-25 RX ADMIN — IPRATROPIUM BROMIDE AND ALBUTEROL SULFATE 3 ML: .5; 3 SOLUTION RESPIRATORY (INHALATION) at 21:43

## 2025-02-25 NOTE — TELEPHONE ENCOUNTER
PT had routine remote on 2/21/25. AF burden was 57.1% which is known to pt. Otherwise all WNL. We can get another download if necessary or check when seen in office

## 2025-02-25 NOTE — TELEPHONE ENCOUNTER
I remote download is a good idea. I recommend an appt within the next full week and to ER if anything worsens.

## 2025-02-25 NOTE — TELEPHONE ENCOUNTER
"[facility] pt last saw AJL on 8/7.    Patient is calling because for a couple weeks she was experiencing a ''pins and needles\" sensation around the area of her pacemaker.  I was trying to discern if she was meaning palpitations.  That has since resolved.     She is also having left arm pain for a couple of weeks.  It does not hurt when her arm is at rest, only with movement.  She denies CP.  She has no swelling.  She has SOA which she reports is baseline for her due to asthma.  She does not check her BP at home but she has given me some random BP from the last week from other MD appts:  105/77  89/60  107/66  Yesterday 107/80    She did fall and hurt her back back on 2/7-she went to ED.  She reports she did not fall on her left arm.  She says the arm pain is unrelated to the fall.    She is unsure of her HR.  I can send a message to see if pacemaker wants to do a remote transmission.    Angelica Hunter RN  [facility] Triage  02/25/25 11:16 EST        "

## 2025-02-25 NOTE — TELEPHONE ENCOUNTER
Patient returned call. She is agreeable to be seen.  I have scheduled her for Monday 3/3 to see LR.  She will go to ER for new or worsening symptoms.    Angelica Hunter RN  [facility] Triage  02/25/25 14:22 EST

## 2025-02-25 NOTE — TELEPHONE ENCOUNTER
Permission for the hub to transfer this call directly to the nurse triage line at [facility].    Attempted to call Caitlyn Longoria, no answer.  Left a voicemail for patient to call back and ask to speak with the triage nurses.  Will continue to try to reach patient.    Angelica Hunter RN  [facility] Triage  02/25/25 13:16 EST

## 2025-02-26 LAB
ALBUMIN SERPL-MCNC: 3.1 G/DL (ref 3.5–5.2)
ALBUMIN/GLOB SERPL: 0.9 G/DL
ALP SERPL-CCNC: 154 U/L (ref 39–117)
ALT SERPL W P-5'-P-CCNC: 15 U/L (ref 1–33)
ANION GAP SERPL CALCULATED.3IONS-SCNC: 9.6 MMOL/L (ref 5–15)
AST SERPL-CCNC: 23 U/L (ref 1–32)
BILIRUB SERPL-MCNC: 0.5 MG/DL (ref 0–1.2)
BUN SERPL-MCNC: 16 MG/DL (ref 8–23)
BUN/CREAT SERPL: 24.2 (ref 7–25)
CALCIUM SPEC-SCNC: 8.3 MG/DL (ref 8.2–9.6)
CHLORIDE SERPL-SCNC: 108 MMOL/L (ref 98–107)
CO2 SERPL-SCNC: 22.4 MMOL/L (ref 22–29)
CREAT SERPL-MCNC: 0.66 MG/DL (ref 0.57–1)
DEPRECATED RDW RBC AUTO: 47 FL (ref 37–54)
EGFRCR SERPLBLD CKD-EPI 2021: 83.5 ML/MIN/1.73
ERYTHROCYTE [DISTWIDTH] IN BLOOD BY AUTOMATED COUNT: 13.3 % (ref 12.3–15.4)
GLOBULIN UR ELPH-MCNC: 3.3 GM/DL
GLUCOSE BLDC GLUCOMTR-MCNC: 207 MG/DL (ref 70–130)
GLUCOSE BLDC GLUCOMTR-MCNC: 252 MG/DL (ref 70–130)
GLUCOSE BLDC GLUCOMTR-MCNC: 258 MG/DL (ref 70–130)
GLUCOSE BLDC GLUCOMTR-MCNC: 294 MG/DL (ref 70–130)
GLUCOSE BLDC GLUCOMTR-MCNC: 329 MG/DL (ref 70–130)
GLUCOSE SERPL-MCNC: 248 MG/DL (ref 65–99)
HCT VFR BLD AUTO: 37.7 % (ref 34–46.6)
HGB BLD-MCNC: 11.9 G/DL (ref 12–15.9)
MCH RBC QN AUTO: 30.5 PG (ref 26.6–33)
MCHC RBC AUTO-ENTMCNC: 31.6 G/DL (ref 31.5–35.7)
MCV RBC AUTO: 96.7 FL (ref 79–97)
PLATELET # BLD AUTO: 119 10*3/MM3 (ref 140–450)
PMV BLD AUTO: 9.8 FL (ref 6–12)
POTASSIUM SERPL-SCNC: 3.8 MMOL/L (ref 3.5–5.2)
PROT SERPL-MCNC: 6.4 G/DL (ref 6–8.5)
RBC # BLD AUTO: 3.9 10*6/MM3 (ref 3.77–5.28)
SODIUM SERPL-SCNC: 140 MMOL/L (ref 136–145)
WBC NRBC COR # BLD AUTO: 7.69 10*3/MM3 (ref 3.4–10.8)

## 2025-02-26 PROCEDURE — 63710000001 INSULIN LISPRO (HUMAN) PER 5 UNITS: Performed by: NURSE PRACTITIONER

## 2025-02-26 PROCEDURE — 94799 UNLISTED PULMONARY SVC/PX: CPT

## 2025-02-26 PROCEDURE — 82948 REAGENT STRIP/BLOOD GLUCOSE: CPT

## 2025-02-26 PROCEDURE — 94761 N-INVAS EAR/PLS OXIMETRY MLT: CPT

## 2025-02-26 PROCEDURE — G0378 HOSPITAL OBSERVATION PER HR: HCPCS

## 2025-02-26 PROCEDURE — 96376 TX/PRO/DX INJ SAME DRUG ADON: CPT

## 2025-02-26 PROCEDURE — 94664 DEMO&/EVAL PT USE INHALER: CPT

## 2025-02-26 PROCEDURE — 94667 MNPJ CHEST WALL 1ST: CPT

## 2025-02-26 PROCEDURE — 25010000002 METHYLPREDNISOLONE PER 125 MG: Performed by: NURSE PRACTITIONER

## 2025-02-26 PROCEDURE — 97116 GAIT TRAINING THERAPY: CPT

## 2025-02-26 PROCEDURE — 97161 PT EVAL LOW COMPLEX 20 MIN: CPT

## 2025-02-26 PROCEDURE — 80053 COMPREHEN METABOLIC PANEL: CPT | Performed by: NURSE PRACTITIONER

## 2025-02-26 PROCEDURE — 85027 COMPLETE CBC AUTOMATED: CPT | Performed by: NURSE PRACTITIONER

## 2025-02-26 RX ORDER — MONTELUKAST SODIUM 10 MG/1
10 TABLET ORAL NIGHTLY
Status: DISCONTINUED | OUTPATIENT
Start: 2025-02-26 | End: 2025-02-27 | Stop reason: HOSPADM

## 2025-02-26 RX ORDER — ROSUVASTATIN CALCIUM 10 MG/1
20 TABLET, COATED ORAL NIGHTLY
Status: DISCONTINUED | OUTPATIENT
Start: 2025-02-26 | End: 2025-02-27 | Stop reason: HOSPADM

## 2025-02-26 RX ORDER — GUAIFENESIN 600 MG/1
1200 TABLET, EXTENDED RELEASE ORAL EVERY 12 HOURS SCHEDULED
Status: DISCONTINUED | OUTPATIENT
Start: 2025-02-26 | End: 2025-02-27 | Stop reason: HOSPADM

## 2025-02-26 RX ORDER — FLUTICASONE PROPIONATE 50 MCG
1 SPRAY, SUSPENSION (ML) NASAL DAILY
Status: DISCONTINUED | OUTPATIENT
Start: 2025-02-26 | End: 2025-02-27 | Stop reason: HOSPADM

## 2025-02-26 RX ORDER — SODIUM CHLORIDE FOR INHALATION 7 %
4 VIAL, NEBULIZER (ML) INHALATION
Status: DISCONTINUED | OUTPATIENT
Start: 2025-02-26 | End: 2025-02-27 | Stop reason: HOSPADM

## 2025-02-26 RX ORDER — WARFARIN SODIUM 2 MG/1
2 TABLET ORAL
Status: DISCONTINUED | OUTPATIENT
Start: 2025-02-27 | End: 2025-02-27 | Stop reason: HOSPADM

## 2025-02-26 RX ORDER — OXYBUTYNIN CHLORIDE 10 MG/1
10 TABLET, EXTENDED RELEASE ORAL NIGHTLY
Status: DISCONTINUED | OUTPATIENT
Start: 2025-02-26 | End: 2025-02-27 | Stop reason: HOSPADM

## 2025-02-26 RX ORDER — WARFARIN SODIUM 4 MG/1
4 TABLET ORAL
Status: DISCONTINUED | OUTPATIENT
Start: 2025-02-26 | End: 2025-02-27 | Stop reason: HOSPADM

## 2025-02-26 RX ORDER — BUDESONIDE 0.5 MG/2ML
0.5 INHALANT ORAL 2 TIMES DAILY
Status: DISCONTINUED | OUTPATIENT
Start: 2025-02-26 | End: 2025-02-27 | Stop reason: HOSPADM

## 2025-02-26 RX ORDER — ACETAMINOPHEN 325 MG/1
650 TABLET ORAL EVERY 4 HOURS PRN
Status: DISCONTINUED | OUTPATIENT
Start: 2025-02-26 | End: 2025-02-27 | Stop reason: HOSPADM

## 2025-02-26 RX ORDER — ALBUTEROL SULFATE 0.83 MG/ML
2.5 SOLUTION RESPIRATORY (INHALATION) EVERY 4 HOURS
Status: DISCONTINUED | OUTPATIENT
Start: 2025-02-26 | End: 2025-02-27

## 2025-02-26 RX ADMIN — INSULIN LISPRO 5 UNITS: 100 INJECTION, SOLUTION INTRAVENOUS; SUBCUTANEOUS at 11:42

## 2025-02-26 RX ADMIN — Medication 5 MG: at 21:55

## 2025-02-26 RX ADMIN — INSULIN LISPRO 2 UNITS: 100 INJECTION, SOLUTION INTRAVENOUS; SUBCUTANEOUS at 16:58

## 2025-02-26 RX ADMIN — MENTHOL, ZINC OXIDE 1 APPLICATION: .44; 20.6 OINTMENT TOPICAL at 14:38

## 2025-02-26 RX ADMIN — BUDESONIDE 0.5 MG: 0.5 INHALANT RESPIRATORY (INHALATION) at 21:02

## 2025-02-26 RX ADMIN — INSULIN LISPRO 3 UNITS: 100 INJECTION, SOLUTION INTRAVENOUS; SUBCUTANEOUS at 08:16

## 2025-02-26 RX ADMIN — ALBUTEROL SULFATE 2.5 MG: 2.5 SOLUTION RESPIRATORY (INHALATION) at 21:02

## 2025-02-26 RX ADMIN — Medication 10 ML: at 00:25

## 2025-02-26 RX ADMIN — Medication 10 ML: at 09:54

## 2025-02-26 RX ADMIN — GUAIFENESIN 1200 MG: 600 TABLET, MULTILAYER, EXTENDED RELEASE ORAL at 14:38

## 2025-02-26 RX ADMIN — CALCIUM CARBONATE-VITAMIN D TAB 500 MG-200 UNIT 1 TABLET: 500-200 TAB at 14:38

## 2025-02-26 RX ADMIN — METHYLPREDNISOLONE SODIUM SUCCINATE 60 MG: 125 INJECTION, POWDER, FOR SOLUTION INTRAMUSCULAR; INTRAVENOUS at 08:16

## 2025-02-26 RX ADMIN — WARFARIN SODIUM 4 MG: 4 TABLET ORAL at 17:00

## 2025-02-26 RX ADMIN — OXYBUTYNIN CHLORIDE 10 MG: 10 TABLET, EXTENDED RELEASE ORAL at 21:56

## 2025-02-26 RX ADMIN — METHYLPREDNISOLONE SODIUM SUCCINATE 60 MG: 125 INJECTION, POWDER, FOR SOLUTION INTRAMUSCULAR; INTRAVENOUS at 22:03

## 2025-02-26 RX ADMIN — Medication 4 ML: at 21:02

## 2025-02-26 RX ADMIN — ROSUVASTATIN CALCIUM 20 MG: 10 TABLET, FILM COATED ORAL at 21:55

## 2025-02-26 RX ADMIN — IPRATROPIUM BROMIDE AND ALBUTEROL SULFATE 3 ML: .5; 3 SOLUTION RESPIRATORY (INHALATION) at 14:43

## 2025-02-26 RX ADMIN — IPRATROPIUM BROMIDE AND ALBUTEROL SULFATE 3 ML: .5; 3 SOLUTION RESPIRATORY (INHALATION) at 00:42

## 2025-02-26 RX ADMIN — INSULIN LISPRO 3 UNITS: 100 INJECTION, SOLUTION INTRAVENOUS; SUBCUTANEOUS at 22:04

## 2025-02-26 RX ADMIN — MENTHOL, ZINC OXIDE 1 APPLICATION: .44; 20.6 OINTMENT TOPICAL at 22:04

## 2025-02-26 RX ADMIN — IPRATROPIUM BROMIDE AND ALBUTEROL SULFATE 3 ML: .5; 3 SOLUTION RESPIRATORY (INHALATION) at 08:23

## 2025-02-26 RX ADMIN — MONTELUKAST 10 MG: 10 TABLET, FILM COATED ORAL at 21:56

## 2025-02-26 RX ADMIN — IPRATROPIUM BROMIDE AND ALBUTEROL SULFATE 3 ML: .5; 3 SOLUTION RESPIRATORY (INHALATION) at 10:40

## 2025-02-26 RX ADMIN — CALCIUM CARBONATE-VITAMIN D TAB 500 MG-200 UNIT 1 TABLET: 500-200 TAB at 21:56

## 2025-02-26 RX ADMIN — INSULIN LISPRO 4 UNITS: 100 INJECTION, SOLUTION INTRAVENOUS; SUBCUTANEOUS at 00:24

## 2025-02-26 RX ADMIN — ALBUTEROL SULFATE 2.5 MG: 2.5 SOLUTION RESPIRATORY (INHALATION) at 23:53

## 2025-02-26 RX ADMIN — FLUTICASONE PROPIONATE 1 SPRAY: 50 SPRAY, METERED NASAL at 14:37

## 2025-02-26 NOTE — PLAN OF CARE
Problem: Adult Inpatient Plan of Care  Goal: Plan of Care Review  Outcome: Progressing  Plan of care reviewed with: patient    Patient admitted to Health system overnight for COPD exacerbation and observation status. A&Ox4. Denies pain. AV paced on cardiac monitor. 2L NC. Resp panel + coronavirus HKU1; placed on droplet precautions. +congested productive cough. Receiving duonebs and IV steroids. Placed on achs- treated with ISS. Purwick placed overnight per patient preference. Stage 2 Pressure injury present on admission; wound consult placed. Plan of care ongoing-overnight observation. Bed alarm on. Call light within reach.

## 2025-02-26 NOTE — PLAN OF CARE
Goal Outcome Evaluation:  Plan of Care Reviewed With: patient           Outcome Evaluation: Pt is a 89 yo F admitted from [facility] with SOA - COPD exacerbation. Pt reports independence at BL with a rollator, states they have a PT at her facility and she has worked with her previously. Pt presents to PT likely close to her BL, limited by SOA. Pt transferred to EOB and stood with SBA. Pt ambulated 60ft with rwx and SBA-CGA. Pt forward flexed, occasionally resting her forearms on her walker and states PT at her facility corrects her frequently as well. Gait distance limited by fatigue/SOA. Returned to room and agreeable to sitting UIC, left with needs met. Pt ambulated on RA, satting 90% on return to room and 2L O2 reapplied. PT will continue to follow, anticipate DC to [facility] with HHPT.    Anticipated Discharge Disposition (PT): home with home health, assisted living

## 2025-02-26 NOTE — H&P
Patient Name:  Caitlyn Longoria  YOB: 1934  MRN:  [id number]  Admit Date:  2/25/2025  Patient Care Team:  Zenaida Suarez MD as PCP - General (Internal Medicine)  Pao Levi CHILO as Pharmacist  Alexander Valiente PharmD as Pharmacist (Pharmacy)  Zenaida Suarez MD as Referring Physician (Internal Medicine)  Lucien Larkin MD as Consulting Physician (Hematology and Oncology)      Subjective   History Present Illness     Chief Complaint   Patient presents with    Shortness of Breath       Ms. Longoria is a 89 y.o. female that presents to [facility] complaining of Shortness of Breath. She has a past medical history of Aortic calcification, Aortic regurgitation, Asthma, Atrial fibrillation, CAD (coronary artery disease), Carpal tunnel syndrome of left wrist, Chronic combined systolic and diastolic congestive heart failure, CKD (chronic kidney disease) stage 3, GFR 30-59 ml/min, COPD (chronic obstructive pulmonary disease), Coronary artery disease involving native coronary artery of native heart with angina pectoris, Disc degeneration, lumbar, Diverticulosis, DM type 2 (diabetes mellitus, type 2), GERD (gastroesophageal reflux disease), History of aneurysm, History of blood transfusion, History of fracture, History of heart attack, History of vitamin D deficiency, Hyperlipidemia, Hypertension, Leukemia, Mild mitral regurgitation, Mitral annular calcification, Osteopenia, PAF (paroxysmal atrial fibrillation), Peripheral neuropathy, Skin cancer, Sleep apnea, SSS (sick sinus syndrome), Stroke (cerebrum), TIA (transient ischemic attack) (2017), and Tricuspid regurgitation.     Shortness of Breath  This is a new problem. The current episode started yesterday. The problem occurs intermittently. The problem has been unchanged. Associated symptoms include a sore throat. Pertinent negatives include no fever or sputum production. She has tried beta agonist inhalers,  steroid inhalers and prescription cough suppressants for the symptoms. Her past medical history is significant for CAD, chronic lung disease and COPD. There has been no fever. Most recent home oxygen level percentage is She wears oxygen at night        Review of Systems   Constitutional:  Negative for fever.   HENT:  Positive for congestion and sore throat.    Respiratory:  Positive for shortness of breath. Negative for sputum production.         Personal History     Past Medical History:   Diagnosis Date    Aortic calcification     mild, on echo 12/17/2017    Aortic regurgitation     Trace    Asthma     Atrial fibrillation     CAD (coronary artery disease)     Carpal tunnel syndrome of left wrist     Chronic combined systolic and diastolic congestive heart failure     CKD (chronic kidney disease) stage 3, GFR 30-59 ml/min     COPD (chronic obstructive pulmonary disease)     Coronary artery disease involving native coronary artery of native heart with angina pectoris     Disc degeneration, lumbar     Diverticulosis     DM type 2 (diabetes mellitus, type 2)     GERD (gastroesophageal reflux disease)     History of aneurysm     right femoral artery s/p LHC    History of blood transfusion     History of fracture     History of heart attack     History of vitamin D deficiency     Hyperlipidemia     Hypertension     Leukemia     Mild mitral regurgitation     Mitral annular calcification     12/8/2017- echo, moderate    Osteopenia     PAF (paroxysmal atrial fibrillation)     Peripheral neuropathy     Skin cancer     Left hand    Sleep apnea     bipap    SSS (sick sinus syndrome)     Stroke (cerebrum)     TIA (transient ischemic attack) 2017    Tricuspid regurgitation     Trace     Past Surgical History:   Procedure Laterality Date    BRONCHOSCOPY Bilateral 10/6/2020    Procedure: BRONCHOSCOPY with BILATERAL LUNG washings;  Surgeon: Juno Coburn MD;  Location: [facility];  Service: Pulmonary;  Laterality:  Bilateral;  PRE: purulent bronchitis  POST: PURULENT BRONCHITIS    BRONCHOSCOPY Bilateral 10/9/2020    Procedure: BRONCHOSCOPY with washing;  Surgeon: Juno Coburn MD;  Location:  [facility];  Service: Pulmonary;  Laterality: Bilateral;  pre/post - mucous plug      CARDIAC CATHETERIZATION      CARDIAC ELECTROPHYSIOLOGY PROCEDURE N/A 2/7/2020    Procedure: PPM generator change - dual  medtronic;  Surgeon: Kiel Field MD;  Location: [facility] CATH INVASIVE LOCATION;  Service: Cardiology;  Laterality: N/A;    CHOLECYSTECTOMY      CORONARY STENT PLACEMENT      ENDOSCOPY N/A 9/14/2022    Procedure: ESOPHAGOGASTRODUODENOSCOPY with 54fr main dilatation;  Surgeon: Enio Cota MD;  Location: [facility] ENDOSCOPY;  Service: Gastroenterology;  Laterality: N/A;  pre - dysphagia  post - s/p dilatation, watermelon stomach    HERNIA REPAIR      hital hernia    HYSTERECTOMY      PACEMAKER IMPLANTATION      REPLACEMENT TOTAL KNEE Bilateral      Family History   Problem Relation Age of Onset    Heart disease Mother     Hypertension Mother     Colon polyps Mother     Heart disease Father     Hypertension Father     Stroke Father     Hypertension Brother     Heart disease Brother     Diabetes Niece     Colon cancer Sister     Hypertension Sister     Hypertension Daughter     Hypertension Son     Hypertension Maternal Aunt     Hypertension Maternal Grandmother     Hypertension Maternal Grandfather     Hypertension Paternal Grandmother     Hypertension Paternal Grandfather      Social History     Tobacco Use    Smoking status: Never     Passive exposure: Never    Smokeless tobacco: Never    Tobacco comments:     caffeine use- soda   Vaping Use    Vaping status: Never Used   Substance Use Topics    Alcohol use: Never    Drug use: No     No current facility-administered medications on file prior to encounter.     Current Outpatient Medications on File Prior to Encounter   Medication Sig Dispense Refill    acetaminophen  (TYLENOL) 325 MG tablet Take 2 tablets by mouth Every 4 (Four) Hours As Needed for Mild Pain .      albuterol (PROVENTIL) (2.5 MG/3ML) 0.083% nebulizer solution Take 2.5 mg by nebulization Every 4 (Four) Hours.      Benralizumab (FASENRA SC) Inject 1 dose under the skin into the appropriate area as directed Every 2 (Two) Months. Every 8 weeks      budesonide (PULMICORT) 0.5 MG/2ML nebulizer solution Take 2 mL by nebulization 2 (Two) Times a Day.      Calcium Carbonate-Vitamin D (calcium 500 mg vitamin D 5 mcg, 200 UT,) 500-5 MG-MCG tablet per tablet Take 1 tablet by mouth 2 (Two) Times a Day. 60 tablet 3    Cetirizine HCl 10 MG capsule Take 1 capsule by mouth Daily.      fluticasone (FLONASE) 50 MCG/ACT nasal spray Administer 1 spray into the nostril(s) as directed by provider Daily.      furosemide (LASIX) 20 MG tablet Take 1 tablet by mouth As Needed (For weight gain of greater than 2 pounds in 1 day or 5 pounds in 1 week). (Patient taking differently: Take 1 tablet by mouth Daily.) 30 tablet 11    glipizide (GLUCOTROL) 5 MG tablet Take 1 tablet by mouth Daily With Breakfast.      melatonin 5 MG tablet tablet Take 1 tablet by mouth Every Night.      metFORMIN ER (GLUCOPHAGE-XR) 500 MG 24 hr tablet Take 1 tablet by mouth Daily With Breakfast.      montelukast (SINGULAIR) 10 MG tablet Take 1 tablet by mouth Every Night.      oxybutynin XL (DITROPAN-XL) 10 MG 24 hr tablet Take 1 tablet by mouth 2 (Two) Times a Day.      rosuvastatin (CRESTOR) 20 MG tablet Take 1 tablet by mouth Every Night.      warfarin (COUMADIN) 4 MG tablet Take one tablet by mouth on mon, wed, fri and take one-half of a tablet by mouth all other days or as directed 65 tablet 1    Acetylcysteine (NAC PO) Take 1,000 mg by mouth 2 (Two) Times a Day.      loperamide (IMODIUM) 2 MG capsule Take 1 capsule by mouth 4 (Four) Times a Day As Needed for Diarrhea.      methocarbamol (ROBAXIN) 750 MG tablet Take 1 tablet by mouth 3 (Three) Times a Day As  Needed for Muscle Spasms. 21 tablet 0    metoprolol tartrate (LOPRESSOR) 25 MG tablet Take 0.5 tablets by mouth Every 12 (Twelve) Hours for 30 days. 30 tablet 0     Allergies   Allergen Reactions    Accupril [Quinapril Hcl] Swelling, Other (See Comments), GI Intolerance and Delirium     HA, constipation     Ahist [Chlorpheniramine] Nausea Only, Other (See Comments) and Dizziness     Headache, Blurred vision    Clarithromycin Nausea Only, Other (See Comments) and Mental Status Change     HA, Depression, Flushing    Esomeprazole GI Intolerance    Latex Other (See Comments)     Skin breakdown    Levalbuterol Swelling    Levocetirizine Diarrhea and GI Intolerance    Lipitor [Atorvastatin] Other (See Comments) and Myalgia     Dark urine    Omeprazole Nausea Only and Other (See Comments)     HA    Pravachol [Pravastatin] Nausea Only and GI Intolerance     Bloated, Constipation, HA    Sulindac Other (See Comments) and Myalgia     HA, joint pain, bruising    Valdecoxib Irritability    Chlorcyclizine Unknown - Low Severity    Chlorpheniramine-Phenylephrine Unknown - High Severity    Diclofenac Sodium Unknown - Low Severity    Diphenhydramine Unknown - Low Severity    Lodine [Etodolac] Unknown - Low Severity    Sulfa Antibiotics Unknown - Low Severity       Objective    Objective     Vital Signs  Temp:  [97.7 °F (36.5 °C)-98.8 °F (37.1 °C)] 97.7 °F (36.5 °C)  Heart Rate:  [80-93] 88  Resp:  [16-18] 18  BP: (123-174)/(59-90) 149/84  SpO2:  [96 %-100 %] 99 %  on  Flow (L/min) (Oxygen Therapy):  [2-4] 2;   Device (Oxygen Therapy): nasal cannula  Body mass index is 31.89 kg/m².    Physical Exam  Vitals and nursing note reviewed.   Constitutional:       General: She is not in acute distress.     Appearance: She is not ill-appearing.   HENT:      Head: Normocephalic and atraumatic.   Cardiovascular:      Rate and Rhythm: Normal rate and regular rhythm.   Pulmonary:      Effort: Pulmonary effort is normal.      Breath sounds:  Wheezing (Scant) present.      Comments: Decreased breath sounds on left  Abdominal:      General: Bowel sounds are normal. There is no distension.      Palpations: Abdomen is soft.      Tenderness: There is no abdominal tenderness.   Musculoskeletal:         General: Normal range of motion.   Skin:     General: Skin is warm and dry.   Neurological:      Mental Status: She is alert and oriented to person, place, and time.   Psychiatric:         Behavior: Behavior normal.         Results Review:  I reviewed the patient's new clinical results.  I reviewed the patient's new imaging results and agree with the interpretation.  I reviewed the patient's other test results and agree with the interpretation  I personally viewed and interpreted the patient's EKG/Telemetry data  Discussed with ED provider.    Lab Results (last 24 hours)       Procedure Component Value Units Date/Time    High Sensitivity Troponin T [[id number]]  (Abnormal) Collected: 02/25/25 1809    Specimen: Blood from Arm, Right Updated: 02/25/25 1837     HS Troponin T 30 ng/L     Narrative:      High Sensitive Troponin T Reference Range:  <14.0 ng/L- Negative Female for AMI  <22.0 ng/L- Negative Male for AMI  >=14 - Abnormal Female indicating possible myocardial injury.  >=22 - Abnormal Male indicating possible myocardial injury.   Clinicians would have to utilize clinical acumen, EKG, Troponin, and serial changes to determine if it is an Acute Myocardial Infarction or myocardial injury due to an underlying chronic condition.         Comprehensive Metabolic Panel [[id number]]  (Abnormal) Collected: 02/25/25 1809    Specimen: Blood from Arm, Right Updated: 02/25/25 1922     Glucose 106 mg/dL      BUN 19 mg/dL      Creatinine 1.00 mg/dL      Sodium 144 mmol/L      Potassium 3.6 mmol/L      Chloride 106 mmol/L      CO2 26.4 mmol/L      Calcium 8.9 mg/dL      Total Protein 6.8 g/dL      Albumin 3.6 g/dL      ALT (SGPT) 15 U/L      AST (SGOT) 28 U/L      Alkaline  Phosphatase 179 U/L      Total Bilirubin 0.4 mg/dL      Globulin 3.2 gm/dL      A/G Ratio 1.1 g/dL      BUN/Creatinine Ratio 19.0     Anion Gap 11.6 mmol/L      eGFR 53.6 mL/min/1.73     Narrative:      GFR Categories in Chronic Kidney Disease (CKD)      GFR Category          GFR (mL/min/1.73)    Interpretation  G1                     90 or greater         Normal or high (1)  G2                      60-89                Mild decrease (1)  G3a                   45-59                Mild to moderate decrease  G3b                   30-44                Moderate to severe decrease  G4                    15-29                Severe decrease  G5                    14 or less           Kidney failure          (1)In the absence of evidence of kidney disease, neither GFR category G1 or G2 fulfill the criteria for CKD.    eGFR calculation 2021 CKD-EPI creatinine equation, which does not include race as a factor    BNP [[id number]]  (Normal) Collected: 02/25/25 1809    Specimen: Blood from Arm, Right Updated: 02/25/25 1922     proBNP 601.0 pg/mL     Narrative:      This assay is used as an aid in the diagnosis of individuals suspected of having heart failure. It can be used as an aid in the diagnosis of acute decompensated heart failure (ADHF) in patients presenting with signs and symptoms of ADHF to the emergency department (ED). In addition, NT-proBNP of <300 pg/mL indicates ADHF is not likely.    Age Range Result Interpretation  NT-proBNP Concentration (pg/mL:      <50             Positive            >450                   Gray                 300-450                    Negative             <300    50-75           Positive            >900                  Gray                300-900                  Negative            <300      >75             Positive            >1800                  Gray                300-1800                  Negative            <300    CBC & Differential [[id number]]  (Abnormal) Collected: 02/25/25  1855    Specimen: Blood Updated: 02/25/25 1918    Narrative:      The following orders were created for panel order CBC & Differential.  Procedure                               Abnormality         Status                     ---------                               -----------         ------                     CBC Auto Differential[[id number]]        Abnormal            Final result                 Please view results for these tests on the individual orders.    Protime-INR [[id number]]  (Abnormal) Collected: 02/25/25 1855    Specimen: Blood Updated: 02/25/25 1923     Protime 19.1 Seconds      INR 1.60    CBC Auto Differential [[id number]]  (Abnormal) Collected: 02/25/25 1855    Specimen: Blood Updated: 02/25/25 1918     WBC 9.25 10*3/mm3      RBC 4.01 10*6/mm3      Hemoglobin 12.7 g/dL      Hematocrit 40.2 %      .2 fL      MCH 31.7 pg      MCHC 31.6 g/dL      RDW 13.6 %      RDW-SD 50.3 fl      MPV 10.1 fL      Platelets 135 10*3/mm3      nRBC 0.0 /100 WBC     Manual Differential [[id number]]  (Abnormal) Collected: 02/25/25 1855    Specimen: Blood Updated: 02/25/25 1959     Neutrophil % 32.0 %      Lymphocyte % 64.0 %      Monocyte % 4.0 %      Neutrophils Absolute 2.96 10*3/mm3      Lymphocytes Absolute 5.92 10*3/mm3      Monocytes Absolute 0.37 10*3/mm3      RBC Morphology Normal     WBC Morphology Normal     Platelet Morphology Normal    Narrative:      ALbumin    Respiratory Panel PCR w/COVID-19(SARS-CoV-2) [facility] In-House, NP Swab in [facility], 2 HR TAT - Swab, Nasopharynx [[id number]]  (Abnormal) Collected: 02/25/25 1857    Specimen: Swab from Nasopharynx Updated: 02/25/25 2030     ADENOVIRUS, PCR Not Detected     Coronavirus 229E Not Detected     Coronavirus HKU1 Detected     Coronavirus NL63 Not Detected     Coronavirus OC43 Not Detected     COVID19 Not Detected     Human Metapneumovirus Not Detected     Human Rhinovirus/Enterovirus Not Detected     Influenza A PCR Not Detected      Influenza B PCR Not Detected     Parainfluenza Virus 1 Not Detected     Parainfluenza Virus 2 Not Detected     Parainfluenza Virus 3 Not Detected     Parainfluenza Virus 4 Not Detected     RSV, PCR Not Detected     Bordetella pertussis pcr Not Detected     Bordetella parapertussis PCR Not Detected     Chlamydophila pneumoniae PCR Not Detected     Mycoplasma pneumo by PCR Not Detected    Narrative:      In the setting of a positive respiratory panel with a viral infection PLUS a negative procalcitonin without other underlying concern for bacterial infection, consider observing off antibiotics or discontinuation of antibiotics and continue supportive care. If the respiratory panel is positive for atypical bacterial infection (Bordetella pertussis, Chlamydophila pneumoniae, or Mycoplasma pneumoniae), consider antibiotic de-escalation to target atypical bacterial infection.    High Sensitivity Troponin T 1Hr [[id number]]  (Abnormal) Collected: 02/25/25 1924    Specimen: Blood Updated: 02/25/25 2001     HS Troponin T 31 ng/L      Troponin T Numeric Delta 1 ng/L      Troponin T % Delta 3    Narrative:      High Sensitive Troponin T Reference Range:  <14.0 ng/L- Negative Female for AMI  <22.0 ng/L- Negative Male for AMI  >=14 - Abnormal Female indicating possible myocardial injury.  >=22 - Abnormal Male indicating possible myocardial injury.   Clinicians would have to utilize clinical acumen, EKG, Troponin, and serial changes to determine if it is an Acute Myocardial Infarction or myocardial injury due to an underlying chronic condition.         POC Glucose Once [[id number]]  (Abnormal) Collected: 02/26/25 0017    Specimen: Blood Updated: 02/26/25 0019     Glucose 294 mg/dL     POC Glucose Once [[id number]]  (Abnormal) Collected: 02/26/25 0559    Specimen: Blood Updated: 02/26/25 0601     Glucose 252 mg/dL     CBC (No Diff) [[id number]]  (Abnormal) Collected: 02/26/25 0605    Specimen: Blood Updated: 02/26/25 0624     WBC 7.69  10*3/mm3      RBC 3.90 10*6/mm3      Hemoglobin 11.9 g/dL      Hematocrit 37.7 %      MCV 96.7 fL      MCH 30.5 pg      MCHC 31.6 g/dL      RDW 13.3 %      RDW-SD 47.0 fl      MPV 9.8 fL      Platelets 119 10*3/mm3     Comprehensive Metabolic Panel [[id number]]  (Abnormal) Collected: 02/26/25 0605    Specimen: Blood Updated: 02/26/25 0645     Glucose 248 mg/dL      BUN 16 mg/dL      Creatinine 0.66 mg/dL      Sodium 140 mmol/L      Potassium 3.8 mmol/L      Chloride 108 mmol/L      CO2 22.4 mmol/L      Calcium 8.3 mg/dL      Total Protein 6.4 g/dL      Albumin 3.1 g/dL      ALT (SGPT) 15 U/L      AST (SGOT) 23 U/L      Alkaline Phosphatase 154 U/L      Total Bilirubin 0.5 mg/dL      Globulin 3.3 gm/dL      A/G Ratio 0.9 g/dL      BUN/Creatinine Ratio 24.2     Anion Gap 9.6 mmol/L      eGFR 83.5 mL/min/1.73     Narrative:      GFR Categories in Chronic Kidney Disease (CKD)      GFR Category          GFR (mL/min/1.73)    Interpretation  G1                     90 or greater         Normal or high (1)  G2                      60-89                Mild decrease (1)  G3a                   45-59                Mild to moderate decrease  G3b                   30-44                Moderate to severe decrease  G4                    15-29                Severe decrease  G5                    14 or less           Kidney failure          (1)In the absence of evidence of kidney disease, neither GFR category G1 or G2 fulfill the criteria for CKD.    eGFR calculation 2021 CKD-EPI creatinine equation, which does not include race as a factor    POC Glucose Once [[id number]]  (Abnormal) Collected: 02/26/25 1043    Specimen: Blood Updated: 02/26/25 1044     Glucose 329 mg/dL             Imaging Results (Last 24 Hours)       Procedure Component Value Units Date/Time    XR Chest 1 View [[id number]] Collected: 02/25/25 1912     Updated: 02/25/25 1917    Narrative:      CXR ONE VIEW      HISTORY: soa     COMPARISON: 9/27/2024     TECHNIQUE:  single portable AP       Impression:         There is mild cardiomegaly, and there is elevation of the left  hemidiaphragm, both unchanged. Multilead left subclavian approach  transvenous pacemaker device remains in place.     There is a submaximal inspiratory result. Allowing for that, there is no  convincing evidence of acute infiltrate, effusion or pneumothorax.           This report was finalized on 2/25/2025 7:13 PM by Dr. Dominick Garcia M.D on Workstation: [id number]               Results for orders placed during the hospital encounter of 08/22/24    Adult Transthoracic Echo Complete W/ Cont if Necessary Per Protocol    Interpretation Summary    Left ventricular wall thickness is consistent with hypertrophy. Sigmoid-shaped ventricular septum is present.    Septal wall motion is abnormal, consistent with right ventricular pacing.    Left ventricular ejection fraction appears to be 41 - 45%.    Left ventricular diastolic function is consistent with (grade II w/high LAP) pseudonormalization    Normal right ventricular cavity size and systolic function noted    The left atrial cavity is moderately dilated.    The aortic valve leaflets are moderately calcified (aortic sclerosis)    Calculated right ventricular systolic pressure from tricuspid regurgitation is 20 mmHg.    There is no evidence of pericardial effusion    ECG 12 Lead Dyspnea   Preliminary Result   HEART RATE=82  bpm   RR Anpejool=224  ms   DC Dwtmxwwn=933  ms   P Horizontal Axis=25  deg   P Front Axis=73  deg   QRSD Rzjkiqlz=377  ms   QT Obrogzev=578  ms   EFwV=466  ms   QRS Axis=-69  deg   T Wave Axis=102  deg   - ABNORMAL ECG -   Atrial-ventricular dual-paced rhythm   No further analysis attempted due to paced rhythm   Date and Time of Study:2025-02-25 18:37:09      Telemetry Scan   Final Result      Telemetry Scan   Final Result        Assessment/Plan   Assessment & Plan   Active Hospital Problems    Diagnosis  POA    **COPD with acute  exacerbation [J44.1]  Yes    Coronavirus infection [B34.2]  Yes    Anemia [D64.9]  Yes    Chronic HFrEF (heart failure with reduced ejection fraction) [I50.22]  Yes    Sick sinus syndrome [I49.5]  Yes    Thrombocytopenia [D69.6]  Yes    CLL (chronic lymphocytic leukemia) [C91.10]  Yes    CKD (chronic kidney disease), stage III [N18.30]  Yes    HTN (hypertension) [I10]  Yes    Chronic bronchitis [J42]  Yes    Type 2 diabetes mellitus [E11.9]  Yes    Paroxysmal atrial fibrillation [I48.0]  Yes      Resolved Hospital Problems   No resolved problems to display.       Ms. Longoria is a 89 y.o. female who is admitted to [facility] with a COPD exacerbation due to viral bronchitis.     Admitted to a telemetry unit for continual monitoring of oxygen saturation, blood pressure, heart rate, respiratory status etc.  SOLUMEDROL 60 mg IV every 12 hours.  DUONEBS every 4 hours scheduled and every 4 hours as needed.  No negation for antibiotics.  Titrate NC Oxygen to keep sats 88-92%  CXR reviewed.  No acute changes, no pneumonia.  Consult pulmonary.  She sees Dr. Coburn.  Add flutter valve (will change to percussion therapy if unable to do flutter valve).   Add hypertonics saline nebs.    Ambulate q shift. Encourage OOB to chair for meals.  Seems compensated from CHF perspective.  Monitor blood glucose especially given that she will be on insulin.  Monitor platelets.  SCDs for DVT prophylaxis.  Pharmacy to reinitiate warfarin.  Full code.  Discussed with patient, ED provider, and Dr. Coburn .      Shamir Wilkins MD  [facility]  02/26/25  12:16 EST

## 2025-02-26 NOTE — PROGRESS NOTES
[facility] Clinical Pharmacy Services: Warfarin Dosing/Monitoring Consult    Caitlyn Longoria is a 89 y.o. female, estimated creatinine clearance is 57.3 mL/min (by C-G formula based on SCr of 0.66 mg/dL). weighing 81.6 kg (180 lb).    Results from last 7 days   Lab Units 02/26/25  0605 02/25/25  1855 02/21/25  0000 02/20/25  1109   INR   --  1.60* 1.80  --    HEMOGLOBIN g/dL 11.9* 12.7  --  12.8   HEMATOCRIT % 37.7 40.2  --  42.4   PLATELETS 10*3/mm3 119* 135*  --  201     Prior to admission anticoagulation: seen at [facility] (patient has been getting 4mg MWF with 2mg all other days)    Hospital Anticoagulation:  Consulting provider: Franky  Start date: PTA  Indication: A Fib - requiring full anticoagulation  Target INR: 2 - 3  Expected duration: indefinite   Bridge Therapy: No      Potential food or drug interactions:     Education complete?/Date: N/A; home medication    Assessment/Plan:  Dose: INR drawn 17 hrs ago was 1.6. No reason to get one today as it will probably be lower than 1.6 (no doses have been given yet) therefore will restart previous clinic regimen as above in PTA anticoagulation: 4 mg MWF w/ 2 mg all other days. Patient will be getting 4 mg today.   Monitor for any signs or symptoms of bleeding  Follow up daily INRs and dose adjustments    Pharmacy will continue to follow until discharge or discontinuation of warfarin.     Kayode Mendoza [facility]  Clinical Pharmacist

## 2025-02-26 NOTE — ED NOTES
"Nursing report ED to floor  Caitlyn Longoria  89 y.o.  female    Caitlyn Longoria is a 89 y.o. female who presents to the ED c/o acute shortness of breath and cough.  Onset of symptoms this morning.  No fever.  No chest pain.  Multiple sick contacts at her facility.     HPI :  HPI  Stated Reason for Visit: shortness of air  History Obtained From: EMS  Duration (Days): 1    Chief Complaint  Chief Complaint   Patient presents with    Shortness of Breath       Admitting doctor:   Isacc Garcia MD    Admitting diagnosis:   The primary encounter diagnosis was Coronavirus infection. A diagnosis of Acute exacerbation of chronic obstructive pulmonary disease (COPD) was also pertinent to this visit.    Code status:   Current Code Status       Date Active Code Status Order ID Comments User Context       Prior            Allergies:   Accupril [quinapril hcl], Ahist [chlorpheniramine], Clarithromycin, Esomeprazole, Latex, Levalbuterol, Levocetirizine, Lipitor [atorvastatin], Omeprazole, Pravachol [pravastatin], Sulindac, Valdecoxib, Chlorcyclizine, Chlorpheniramine-phenylephrine, Diclofenac sodium, Diphenhydramine, Lodine [etodolac], and Sulfa antibiotics    Isolation:   No active isolations    Intake and Output  No intake or output data in the 24 hours ending 02/25/25 2231    Weight:       02/25/25 1900   Weight: 79.7 kg (175 lb 11.3 oz)       Most recent vitals:   Vitals:    02/25/25 1900 02/25/25 1901 02/25/25 2031 02/25/25 2143   BP:  143/77 152/88    BP Location:       Patient Position:       Pulse: 80 80 80 84   Resp:    18   Temp:       TempSrc:       SpO2: 97% 97% 96% 96%   Weight: 79.7 kg (175 lb 11.3 oz)      Height: 160 cm (62.99\")          Active LDAs/IV Access:   Lines, Drains & Airways       Active LDAs       Name Placement date Placement time Site Days    Peripheral IV 02/25/25 1925 Anterior;Right Forearm 02/25/25 1925  Forearm  less than 1                    Labs (abnormal labs have a star):   Labs Reviewed "   RESPIRATORY PANEL PCR W/ COVID-19 (SARS-COV-2), NP SWAB IN UTM/VTP, 2 HR TAT - Abnormal; Notable for the following components:       Result Value    Coronavirus HKU1 Detected (*)     All other components within normal limits    Narrative:     In the setting of a positive respiratory panel with a viral infection PLUS a negative procalcitonin without other underlying concern for bacterial infection, consider observing off antibiotics or discontinuation of antibiotics and continue supportive care. If the respiratory panel is positive for atypical bacterial infection (Bordetella pertussis, Chlamydophila pneumoniae, or Mycoplasma pneumoniae), consider antibiotic de-escalation to target atypical bacterial infection.   TROPONIN - Abnormal; Notable for the following components:    HS Troponin T 30 (*)     All other components within normal limits    Narrative:     High Sensitive Troponin T Reference Range:  <14.0 ng/L- Negative Female for AMI  <22.0 ng/L- Negative Male for AMI  >=14 - Abnormal Female indicating possible myocardial injury.  >=22 - Abnormal Male indicating possible myocardial injury.   Clinicians would have to utilize clinical acumen, EKG, Troponin, and serial changes to determine if it is an Acute Myocardial Infarction or myocardial injury due to an underlying chronic condition.        HIGH SENSITIVITIY TROPONIN T 1HR - Abnormal; Notable for the following components:    HS Troponin T 31 (*)     All other components within normal limits    Narrative:     High Sensitive Troponin T Reference Range:  <14.0 ng/L- Negative Female for AMI  <22.0 ng/L- Negative Male for AMI  >=14 - Abnormal Female indicating possible myocardial injury.  >=22 - Abnormal Male indicating possible myocardial injury.   Clinicians would have to utilize clinical acumen, EKG, Troponin, and serial changes to determine if it is an Acute Myocardial Infarction or myocardial injury due to an underlying chronic condition.        COMPREHENSIVE  METABOLIC PANEL - Abnormal; Notable for the following components:    Glucose 106 (*)     Alkaline Phosphatase 179 (*)     eGFR 53.6 (*)     All other components within normal limits    Narrative:     GFR Categories in Chronic Kidney Disease (CKD)      GFR Category          GFR (mL/min/1.73)    Interpretation  G1                     90 or greater         Normal or high (1)  G2                      60-89                Mild decrease (1)  G3a                   45-59                Mild to moderate decrease  G3b                   30-44                Moderate to severe decrease  G4                    15-29                Severe decrease  G5                    14 or less           Kidney failure          (1)In the absence of evidence of kidney disease, neither GFR category G1 or G2 fulfill the criteria for CKD.    eGFR calculation 2021 CKD-EPI creatinine equation, which does not include race as a factor   PROTIME-INR - Abnormal; Notable for the following components:    Protime 19.1 (*)     INR 1.60 (*)     All other components within normal limits   CBC WITH AUTO DIFFERENTIAL - Abnormal; Notable for the following components:    .2 (*)     Platelets 135 (*)     All other components within normal limits   MANUAL DIFFERENTIAL - Abnormal; Notable for the following components:    Neutrophil % 32.0 (*)     Lymphocyte % 64.0 (*)     Monocyte % 4.0 (*)     Lymphocytes Absolute 5.92 (*)     All other components within normal limits    Narrative:     ALbumin   BNP (IN-HOUSE) - Normal    Narrative:     This assay is used as an aid in the diagnosis of individuals suspected of having heart failure. It can be used as an aid in the diagnosis of acute decompensated heart failure (ADHF) in patients presenting with signs and symptoms of ADHF to the emergency department (ED). In addition, NT-proBNP of <300 pg/mL indicates ADHF is not likely.    Age Range Result Interpretation  NT-proBNP Concentration (pg/mL:      <50              Positive            >450                   Gray                 300-450                    Negative             <300    50-75           Positive            >900                  Gray                300-900                  Negative            <300      >75             Positive            >1800                  Gray                300-1800                  Negative            <300   CBC AND DIFFERENTIAL    Narrative:     The following orders were created for panel order CBC & Differential.  Procedure                               Abnormality         Status                     ---------                               -----------         ------                     CBC Auto Differential[[id number]]        Abnormal            Final result                 Please view results for these tests on the individual orders.       EKG:   ECG 12 Lead Dyspnea   Preliminary Result   HEART RATE=82  bpm   RR Apekwdlt=318  ms   OH Znvfjhal=691  ms   P Horizontal Axis=25  deg   P Front Axis=73  deg   QRSD Ukdymjqi=665  ms   QT Yuuufsqr=111  ms   CVtE=529  ms   QRS Axis=-69  deg   T Wave Axis=102  deg   - ABNORMAL ECG -   Atrial-ventricular dual-paced rhythm   No further analysis attempted due to paced rhythm   Date and Time of Study:2025-02-25 18:37:09          Meds given in ED:   Medications   ipratropium-albuterol (DUO-NEB) nebulizer solution 3 mL (3 mL Nebulization Given 2/25/25 2143)   methylPREDNISolone sodium succinate (SOLU-Medrol) injection 125 mg (125 mg Intravenous Given 2/25/25 2131)       Imaging results:  XR Chest 1 View    Result Date: 2/25/2025   There is mild cardiomegaly, and there is elevation of the left hemidiaphragm, both unchanged. Multilead left subclavian approach transvenous pacemaker device remains in place.  There is a submaximal inspiratory result. Allowing for that, there is no convincing evidence of acute infiltrate, effusion or pneumothorax.    This report was finalized on 2/25/2025 7:13 PM by Dr. Tan  CHECO Garcia on Workstation: [id number]       Ambulatory status:   - assist x1 with walker    Social issues:   Social History     Socioeconomic History    Marital status: Single   Tobacco Use    Smoking status: Never     Passive exposure: Never    Smokeless tobacco: Never    Tobacco comments:     caffeine use- soda   Vaping Use    Vaping status: Never Used   Substance and Sexual Activity    Alcohol use: Never    Drug use: No    Sexual activity: Defer       Peripheral Neurovascular  Peripheral Neurovascular (Adult)  Peripheral Neurovascular WDL: WDL    Neuro Cognitive  Neuro Cognitive (Adult)  Cognitive/Neuro/Behavioral WDL: WDL, orientation  Orientation: oriented x 4    Learning  Learning Assessment  Learning Readiness and Ability: no barriers identified    Respiratory  Respiratory WDL  Respiratory WDL: cough (woke up with SOA and wet cough this morning. pt requiring O2 now, wears O2 PRN at home.)  Cough Frequency: frequent  Cough Type: productive  Breath Sounds Post-Respiratory Treatment  Breath Sounds Posttreatment All Fields: All Fields  Breath Sounds Posttreatment All Fields: no change    Abdominal Pain       Pain Assessments  Pain (Adult)  (0-10) Pain Rating: Rest: 0  (0-10) Pain Rating: Activity: 0    NIH Stroke Scale       Alma Estrella RN  02/25/25 22:31 EST

## 2025-02-26 NOTE — ED PROVIDER NOTES
EMERGENCY DEPARTMENT ENCOUNTER    Room Number:  28/28  PCP: Zenaida Suarez MD    HPI:  Chief Complaint: Shortness of breath  A complete HPI/ROS/PMH/PSH/SH/FH are unobtainable due to: None  Context: Caitlyn Longoria is a 89 y.o. female who presents to the ED c/o acute shortness of breath and cough.  Onset of symptoms this morning.  No fever.  No chest pain.  Multiple sick contacts at her facility.        PAST MEDICAL HISTORY  Active Ambulatory Problems     Diagnosis Date Noted    Neck and shoulder pain 03/24/2017    Arthropathy of shoulder region 03/24/2017    Carpal tunnel syndrome of left wrist 03/24/2017    Chronic pain of both shoulders 06/27/2017    Chronic left shoulder pain 12/05/2017    Arthropathy of left shoulder 12/05/2017    Dysarthria 12/07/2017    Type 2 diabetes mellitus 12/07/2017    Paroxysmal atrial fibrillation 12/07/2017    HLD (hyperlipidemia) 12/07/2017    Chronic bronchitis 12/07/2017    Coronary artery disease 12/07/2017    CVA (cerebral vascular accident) 12/10/2017    HTN (hypertension) 01/14/2018    CKD (chronic kidney disease), stage III 01/14/2018    Chronic combined systolic (congestive) and diastolic (congestive) heart failure 01/15/2018    Infection of prosthetic right knee joint 01/17/2018    Stasis dermatitis of right lower extremity due to peripheral venous hypertension 04/28/2018    Current use of long term anticoagulation 04/28/2018    Morbid obesity 02/08/2019    Acute respiratory failure with hypoxia 09/16/2019    Rhinovirus 02/11/2020    Asthma with acute exacerbation 02/11/2020    Acute respiratory failure with hypoxemia 02/12/2020    Viral pneumonia 04/23/2020    Hypoxia 08/29/2020    CLL (chronic lymphocytic leukemia) 08/31/2020    Dyspnea 09/29/2020    Anticoagulated on Coumadin     Osteoporotic compression fracture of spine 09/30/2020    Pneumonia due to gram-negative bacteria 10/02/2020    Pneumonia due to infectious organism 09/29/2020    Bilateral carotid  artery disease 10/28/2020    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    Cellulitis of right lower extremity 04/14/2024    Hypokalemia 04/15/2024    Nocturnal hypoxia 04/15/2024    COPD (chronic obstructive pulmonary disease) 04/15/2024    Upper respiratory tract infection 08/25/2024    COPD (chronic obstructive pulmonary disease) 08/25/2024    Sick sinus syndrome 08/25/2024    Anemia 08/25/2024    Thrombocytopenia 08/25/2024    Chronic HFrEF (heart failure with reduced ejection fraction) 08/25/2024    Cellulitis of left lower leg 08/26/2024    Acute exacerbation of COPD with asthma 09/27/2024    Leukocytosis 09/27/2024    COPD exacerbation 09/27/2024     Resolved Ambulatory Problems     Diagnosis Date Noted    Hypernatremia 01/15/2018    Shortness of breath 04/28/2020    Shortness of breath 08/22/2024    Hypokalemia 08/25/2024    Hypomagnesemia 08/25/2024    Hypocalcemia 08/25/2024     Past Medical History:   Diagnosis Date    Aortic calcification     Aortic regurgitation     Asthma     Atrial fibrillation     CAD (coronary artery disease)     Chronic combined systolic and diastolic congestive heart failure     CKD (chronic kidney disease) stage 3, GFR 30-59 ml/min     Coronary artery disease involving native coronary artery of native heart with angina pectoris     Disc degeneration, lumbar     Diverticulosis     DM type 2 (diabetes mellitus, type 2)     GERD (gastroesophageal reflux disease)     History of aneurysm     History of blood transfusion     History of fracture     History of heart attack     History of vitamin D deficiency     Hyperlipidemia     Hypertension     Leukemia     Mild mitral regurgitation     Mitral annular calcification     Osteopenia     PAF (paroxysmal atrial fibrillation)     Peripheral neuropathy     Skin cancer     Sleep apnea     SSS (sick sinus syndrome)     Stroke (cerebrum)     TIA (transient ischemic attack) 2017    Tricuspid regurgitation          PAST  SURGICAL HISTORY  Past Surgical History:   Procedure Laterality Date    BRONCHOSCOPY Bilateral 10/6/2020    Procedure: BRONCHOSCOPY with BILATERAL LUNG washings;  Surgeon: Juno Coburn MD;  [facility];  Service: Pulmonary;  Laterality: Bilateral;  PRE: purulent bronchitis  POST: PURULENT BRONCHITIS    BRONCHOSCOPY Bilateral 10/9/2020    Procedure: BRONCHOSCOPY with washing;  Surgeon: Juno Coburn MD;  Location: [facility] ENDOSCOPY;  Service: Pulmonary;  Laterality: Bilateral;  pre/post - mucous plug      CARDIAC CATHETERIZATION      CARDIAC ELECTROPHYSIOLOGY PROCEDURE N/A 2/7/2020    Procedure: PPM generator change - dual  medtronic;  Surgeon: Kiel Field MD;  Location: [facility] CATH INVASIVE LOCATION;  Service: Cardiology;  Laterality: N/A;    CHOLECYSTECTOMY      CORONARY STENT PLACEMENT      ENDOSCOPY N/A 9/14/2022    Procedure: ESOPHAGOGASTRODUODENOSCOPY with 54fr main dilatation;  Surgeon: Enio Cota MD;  Location: [facility] ENDOSCOPY;  Service: Gastroenterology;  Laterality: N/A;  pre - dysphagia  post - s/p dilatation, watermelon stomach    HERNIA REPAIR      hital hernia    HYSTERECTOMY      PACEMAKER IMPLANTATION      REPLACEMENT TOTAL KNEE Bilateral          FAMILY HISTORY  Family History   Problem Relation Age of Onset    Heart disease Mother     Hypertension Mother     Colon polyps Mother     Heart disease Father     Hypertension Father     Stroke Father     Hypertension Brother     Heart disease Brother     Diabetes Niece     Colon cancer Sister     Hypertension Sister     Hypertension Daughter     Hypertension Son     Hypertension Maternal Aunt     Hypertension Maternal Grandmother     Hypertension Maternal Grandfather     Hypertension Paternal Grandmother     Hypertension Paternal Grandfather          SOCIAL HISTORY  Social History     Socioeconomic History    Marital status: Single   Tobacco Use    Smoking status: Never     Passive exposure: Never    Smokeless tobacco:  Never    Tobacco comments:     caffeine use- soda   Vaping Use    Vaping status: Never Used   Substance and Sexual Activity    Alcohol use: Never    Drug use: No    Sexual activity: Defer         ALLERGIES  Accupril [quinapril hcl], Ahist [chlorpheniramine], Clarithromycin, Esomeprazole, Latex, Levalbuterol, Levocetirizine, Lipitor [atorvastatin], Omeprazole, Pravachol [pravastatin], Sulindac, Valdecoxib, Chlorcyclizine, Chlorpheniramine-phenylephrine, Diclofenac sodium, Diphenhydramine, Lodine [etodolac], and Sulfa antibiotics        REVIEW OF SYSTEMS  Review of Systems     Included in HPI  All systems reviewed and negative except for those discussed in HPI.       PHYSICAL EXAM  ED Triage Vitals [02/25/25 1747]   Temp Heart Rate Resp BP SpO2   98.8 °F (37.1 °C) 93 18 174/89 100 %      Temp src Heart Rate Source Patient Position BP Location FiO2 (%)   Oral Monitor Lying Right arm --       Physical Exam      GENERAL: no acute distress  HENT: nares patent  EYES: no scleral icterus  CV: regular rhythm, normal rate  RESPIRATORY: normal effort, bilateral rhonchi  ABDOMEN: soft, nontender  MUSCULOSKELETAL: no deformity  NEURO: alert, moves all extremities, follows commands  PSYCH:  calm, cooperative  SKIN: warm, dry    Vital signs and nursing notes reviewed.          LAB RESULTS  Recent Results (from the past 24 hours)   High Sensitivity Troponin T    Collection Time: 02/25/25  6:09 PM    Specimen: Arm, Right; Blood   Result Value Ref Range    HS Troponin T 30 (H) <14 ng/L   Comprehensive Metabolic Panel    Collection Time: 02/25/25  6:09 PM    Specimen: Arm, Right; Blood   Result Value Ref Range    Glucose 106 (H) 65 - 99 mg/dL    BUN 19 8 - 23 mg/dL    Creatinine 1.00 0.57 - 1.00 mg/dL    Sodium 144 136 - 145 mmol/L    Potassium 3.6 3.5 - 5.2 mmol/L    Chloride 106 98 - 107 mmol/L    CO2 26.4 22.0 - 29.0 mmol/L    Calcium 8.9 8.2 - 9.6 mg/dL    Total Protein 6.8 6.0 - 8.5 g/dL    Albumin 3.6 3.5 - 5.2 g/dL    ALT (SGPT)  15 1 - 33 U/L    AST (SGOT) 28 1 - 32 U/L    Alkaline Phosphatase 179 (H) 39 - 117 U/L    Total Bilirubin 0.4 0.0 - 1.2 mg/dL    Globulin 3.2 gm/dL    A/G Ratio 1.1 g/dL    BUN/Creatinine Ratio 19.0 7.0 - 25.0    Anion Gap 11.6 5.0 - 15.0 mmol/L    eGFR 53.6 (L) >60.0 mL/min/1.73   BNP    Collection Time: 02/25/25  6:09 PM    Specimen: Arm, Right; Blood   Result Value Ref Range    proBNP 601.0 0.0 - 1,800.0 pg/mL   ECG 12 Lead Dyspnea    Collection Time: 02/25/25  6:37 PM   Result Value Ref Range    QT Interval 409 ms    QTC Interval 478 ms   Protime-INR    Collection Time: 02/25/25  6:55 PM    Specimen: Blood   Result Value Ref Range    Protime 19.1 (H) 11.7 - 14.2 Seconds    INR 1.60 (H) 0.90 - 1.10   CBC Auto Differential    Collection Time: 02/25/25  6:55 PM    Specimen: Blood   Result Value Ref Range    WBC 9.25 3.40 - 10.80 10*3/mm3    RBC 4.01 3.77 - 5.28 10*6/mm3    Hemoglobin 12.7 12.0 - 15.9 g/dL    Hematocrit 40.2 34.0 - 46.6 %    .2 (H) 79.0 - 97.0 fL    MCH 31.7 26.6 - 33.0 pg    MCHC 31.6 31.5 - 35.7 g/dL    RDW 13.6 12.3 - 15.4 %    RDW-SD 50.3 37.0 - 54.0 fl    MPV 10.1 6.0 - 12.0 fL    Platelets 135 (L) 140 - 450 10*3/mm3    nRBC 0.0 0.0 - 0.2 /100 WBC   Manual Differential    Collection Time: 02/25/25  6:55 PM    Specimen: Blood   Result Value Ref Range    Neutrophil % 32.0 (L) 42.7 - 76.0 %    Lymphocyte % 64.0 (H) 19.6 - 45.3 %    Monocyte % 4.0 (L) 5.0 - 12.0 %    Neutrophils Absolute 2.96 1.70 - 7.00 10*3/mm3    Lymphocytes Absolute 5.92 (H) 0.70 - 3.10 10*3/mm3    Monocytes Absolute 0.37 0.10 - 0.90 10*3/mm3    RBC Morphology Normal Normal    WBC Morphology Normal Normal    Platelet Morphology Normal Normal   Respiratory Panel PCR w/COVID-19(SARS-CoV-2) JACEY/EVONNE/LUÍS/PAD/COR/HELEN In-House, NP Swab in UTM/VTM, 2 HR TAT - Swab, Nasopharynx    Collection Time: 02/25/25  6:57 PM    Specimen: Nasopharynx; Swab   Result Value Ref Range    ADENOVIRUS, PCR Not Detected Not Detected    Coronavirus  229E Not Detected Not Detected    Coronavirus HKU1 Detected (A) Not Detected    Coronavirus NL63 Not Detected Not Detected    Coronavirus OC43 Not Detected Not Detected    COVID19 Not Detected Not Detected - Ref. Range    Human Metapneumovirus Not Detected Not Detected    Human Rhinovirus/Enterovirus Not Detected Not Detected    Influenza A PCR Not Detected Not Detected    Influenza B PCR Not Detected Not Detected    Parainfluenza Virus 1 Not Detected Not Detected    Parainfluenza Virus 2 Not Detected Not Detected    Parainfluenza Virus 3 Not Detected Not Detected    Parainfluenza Virus 4 Not Detected Not Detected    RSV, PCR Not Detected Not Detected    Bordetella pertussis pcr Not Detected Not Detected    Bordetella parapertussis PCR Not Detected Not Detected    Chlamydophila pneumoniae PCR Not Detected Not Detected    Mycoplasma pneumo by PCR Not Detected Not Detected   High Sensitivity Troponin T 1Hr    Collection Time: 02/25/25  7:24 PM    Specimen: Blood   Result Value Ref Range    HS Troponin T 31 (H) <14 ng/L    Troponin T Numeric Delta 1 ng/L    Troponin T % Delta 3 Abnormal if >/= 20%       Ordered the above labs and reviewed the results.        RADIOLOGY  XR Chest 1 View    Result Date: 2/25/2025  CXR ONE VIEW  HISTORY: soa  COMPARISON: 9/27/2024  TECHNIQUE: single portable AP       There is mild cardiomegaly, and there is elevation of the left hemidiaphragm, both unchanged. Multilead left subclavian approach transvenous pacemaker device remains in place.  There is a submaximal inspiratory result. Allowing for that, there is no convincing evidence of acute infiltrate, effusion or pneumothorax.    This report was finalized on 2/25/2025 7:13 PM by Dr. Dominick Garcia M.D on Workstation: [id number]       Ordered the above noted radiological studies. Reviewed by me in PACS.        MEDICATIONS GIVEN IN ER  Medications   ipratropium-albuterol (DUO-NEB) nebulizer solution 3 mL (3 mL Nebulization Given 2/25/25  2143)   methylPREDNISolone sodium succinate (SOLU-Medrol) injection 125 mg (125 mg Intravenous Given 2/25/25 2131)         ORDERS PLACED DURING THIS VISIT:  Orders Placed This Encounter   Procedures    Respiratory Panel PCR w/COVID-19(SARS-CoV-2) JACEY/EVONNE/LUÍS/PAD/COR/HELEN In-House, NP Swab in UTM/VTM, 2 HR TAT - Swab, Nasopharynx    XR Chest 1 View    High Sensitivity Troponin T    High Sensitivity Troponin T 1Hr    Comprehensive Metabolic Panel    BNP    Protime-INR    CBC Auto Differential    Manual Differential    Monitor Blood Pressure    Continuous Pulse Oximetry    LHA (on-call MD unless specified) Details    ECG 12 Lead Dyspnea    Initiate Observation Status    CBC & Differential         OUTPATIENT MEDICATION MANAGEMENT:  No current Epic-ordered facility-administered medications on file.     Current Outpatient Medications Ordered in Epic   Medication Sig Dispense Refill    acetaminophen (TYLENOL) 325 MG tablet Take 2 tablets by mouth Every 4 (Four) Hours As Needed for Mild Pain .      Acetylcysteine (NAC PO) Take 1,000 mg by mouth 2 (Two) Times a Day.      albuterol (PROVENTIL) (2.5 MG/3ML) 0.083% nebulizer solution Take 2.5 mg by nebulization Every 4 (Four) Hours.      Benralizumab (FASENRA SC) Inject 1 dose under the skin into the appropriate area as directed Every 2 (Two) Months. Every 8 weeks      budesonide (PULMICORT) 0.5 MG/2ML nebulizer solution Take 2 mL by nebulization 2 (Two) Times a Day.      Calcium Carbonate-Vitamin D (calcium 500 mg vitamin D 5 mcg, 200 UT,) 500-5 MG-MCG tablet per tablet Take 1 tablet by mouth 2 (Two) Times a Day. 60 tablet 3    Cetirizine HCl 10 MG capsule Take 1 capsule by mouth Daily.      fluticasone (FLONASE) 50 MCG/ACT nasal spray Administer 1 spray into the nostril(s) as directed by provider Daily.      furosemide (LASIX) 20 MG tablet Take 1 tablet by mouth As Needed (For weight gain of greater than 2 pounds in 1 day or 5 pounds in 1 week). (Patient taking differently:  Take 1 tablet by mouth Daily.) 30 tablet 11    glipizide (GLUCOTROL) 5 MG tablet Take 1 tablet by mouth Daily With Breakfast.      loperamide (IMODIUM) 2 MG capsule Take 1 capsule by mouth 4 (Four) Times a Day As Needed for Diarrhea.      melatonin 5 MG tablet tablet Take 1 tablet by mouth Every Night.      metFORMIN ER (GLUCOPHAGE-XR) 500 MG 24 hr tablet Take 1 tablet by mouth Daily With Breakfast.      methocarbamol (ROBAXIN) 750 MG tablet Take 1 tablet by mouth 3 (Three) Times a Day As Needed for Muscle Spasms. 21 tablet 0    metoprolol tartrate (LOPRESSOR) 25 MG tablet Take 0.5 tablets by mouth Every 12 (Twelve) Hours for 30 days. 30 tablet 0    montelukast (SINGULAIR) 10 MG tablet Take 1 tablet by mouth Every Night.      oxybutynin XL (DITROPAN-XL) 10 MG 24 hr tablet Take 1 tablet by mouth 2 (Two) Times a Day.      rosuvastatin (CRESTOR) 20 MG tablet Take 1 tablet by mouth Every Night.      warfarin (COUMADIN) 4 MG tablet Take one tablet by mouth on mon, wed, fri and take one-half of a tablet by mouth all other days or as directed 65 tablet 1       PROCEDURES  Procedures          MEDICAL DECISION MAKING, PROGRESS, and CONSULTS    Discussion below represents my analysis of pertinent findings related to patient's condition, differential diagnosis, treatment plan and final disposition.            Differential diagnosis:    Differential diagnosis includes but not limited to CHF, acute coronary syndrome, pulmonary embolism, pneumothorax, pneumonia, asthma/COPD, pulmonary hypertension, deconditioning, anemia, other hematologic etiologies such as CO poisoning, anxiety.                      Independent interpretation of labs, radiology studies, and discussions with consultants:  ED Course as of 02/25/25 2150 Tue Feb 25, 2025 1846 EKG and pendantly interpreted by myself.  Time 6:37 PM.  AV paced.  Heart rate 82.  Negative Sgarbossa criteria. [TD]   1958 INR(!): 1.60 [TD]   2032 Coronavirus HKU1(!): Detected [TD]    2059 On repeat evaluation, patient has mild wheezing.  I will give her a DuoNeb treatment. [TD]      ED Course User Index  [TD] Dominick Schuster II, MD         Discussed the case with KRISTIN Andujar.  She will admit for Dr. Garcia.        Clinical Scores:                                   DIAGNOSIS  Final diagnoses:   Coronavirus infection   Acute exacerbation of chronic obstructive pulmonary disease (COPD)         DISPOSITION  Admit      Latest Documented Vital Signs:  As of 21:50 EST  BP- 152/88 HR- 84 Temp- 98.8 °F (37.1 °C) (Oral) O2 sat- 96%      --    Please note that portions of this were completed with a voice recognition program.       Note Disclaimer: At [facility], we believe that sharing information builds trust and better relationships. You are receiving this note because you are receiving care at [facility] or recently visited. It is possible you will see health information before a provider has talked with you about it. This kind of information can be easy to misunderstand. To help you fully understand what it means for your health, we urge you to discuss this note with your provider.         Dominick Schuster II, MD  02/25/25 [id number]

## 2025-02-26 NOTE — THERAPY EVALUATION
Patient Name: Caitlyn Longoria  : 1934    MRN: [id number]                              Today's Date: 2025       Admit Date: 2025    Visit Dx:     ICD-10-CM ICD-9-CM   1. Coronavirus infection  B34.2 079.89   2. Acute exacerbation of chronic obstructive pulmonary disease (COPD)  J44.1 491.21     Patient Active Problem List   Diagnosis    Neck and shoulder pain    Arthropathy of shoulder region    Carpal tunnel syndrome of left wrist    Chronic pain of both shoulders    Chronic left shoulder pain    Arthropathy of left shoulder    Dysarthria    Type 2 diabetes mellitus    Paroxysmal atrial fibrillation    HLD (hyperlipidemia)    Chronic bronchitis    Coronary artery disease    CVA (cerebral vascular accident)    HTN (hypertension)    CKD (chronic kidney disease), stage III    Chronic combined systolic (congestive) and diastolic (congestive) heart failure    Infection of prosthetic right knee joint    Stasis dermatitis of right lower extremity due to peripheral venous hypertension    Current use of long term anticoagulation    Morbid obesity    Acute respiratory failure with hypoxia    Rhinovirus    Asthma with acute exacerbation    Acute respiratory failure with hypoxemia    Viral pneumonia    Hypoxia    CLL (chronic lymphocytic leukemia)    Dyspnea    Anticoagulated on Coumadin    Osteoporotic compression fracture of spine    Pneumonia due to gram-negative bacteria    Pneumonia due to infectious organism    Bilateral carotid artery disease    Hypogammaglobulinemia    Hypogammaglobulinemia    Cellulitis of right lower extremity    Hypokalemia    Nocturnal hypoxia    COPD (chronic obstructive pulmonary disease)    Upper respiratory tract infection    COPD (chronic obstructive pulmonary disease)    Sick sinus syndrome    Anemia    Thrombocytopenia    Chronic HFrEF (heart failure with reduced ejection fraction)    Cellulitis of left lower leg    Acute exacerbation of COPD with asthma    Leukocytosis     COPD exacerbation    COPD with acute exacerbation    Coronavirus infection     Past Medical History:   Diagnosis Date    Aortic calcification     mild, on echo 12/17/2017    Aortic regurgitation     Trace    Asthma     Atrial fibrillation     CAD (coronary artery disease)     Carpal tunnel syndrome of left wrist     Chronic combined systolic and diastolic congestive heart failure     CKD (chronic kidney disease) stage 3, GFR 30-59 ml/min     COPD (chronic obstructive pulmonary disease)     Coronary artery disease involving native coronary artery of native heart with angina pectoris     Disc degeneration, lumbar     Diverticulosis     DM type 2 (diabetes mellitus, type 2)     GERD (gastroesophageal reflux disease)     History of aneurysm     right femoral artery s/p LHC    History of blood transfusion     History of fracture     History of heart attack     History of vitamin D deficiency     Hyperlipidemia     Hypertension     Leukemia     Mild mitral regurgitation     Mitral annular calcification     12/8/2017- echo, moderate    Osteopenia     PAF (paroxysmal atrial fibrillation)     Peripheral neuropathy     Skin cancer     Left hand    Sleep apnea     bipap    SSS (sick sinus syndrome)     Stroke (cerebrum)     TIA (transient ischemic attack) 2017    Tricuspid regurgitation     Trace     Past Surgical History:   Procedure Laterality Date    BRONCHOSCOPY Bilateral 10/6/2020    Procedure: BRONCHOSCOPY with BILATERAL LUNG washings;  Surgeon: Juno Coburn MD;  [facility];  Service: Pulmonary;  Laterality: Bilateral;  PRE: purulent bronchitis  POST: PURULENT BRONCHITIS    BRONCHOSCOPY Bilateral 10/9/2020    Procedure: BRONCHOSCOPY with washing;  Surgeon: Juno Coburn MD;  Location: [facility] ENDOSCOPY;  Service: Pulmonary;  Laterality: Bilateral;  pre/post - mucous plug      CARDIAC CATHETERIZATION      CARDIAC ELECTROPHYSIOLOGY PROCEDURE N/A 2/7/2020    Procedure: PPM generator change - dual   medtronic;  Surgeon: Kiel Field MD;  Location:  [facility];  Service: Cardiology;  Laterality: N/A;    CHOLECYSTECTOMY      CORONARY STENT PLACEMENT      ENDOSCOPY N/A 9/14/2022    Procedure: ESOPHAGOGASTRODUODENOSCOPY with 54fr main dilatation;  Surgeon: Enio Cota MD;  Location:  [facility] ENDOSCOPY;  Service: Gastroenterology;  Laterality: N/A;  pre - dysphagia  post - s/p dilatation, watermelon stomach    HERNIA REPAIR      hital hernia    HYSTERECTOMY      PACEMAKER IMPLANTATION      REPLACEMENT TOTAL KNEE Bilateral       General Information       Row Name 02/26/25 0909          Physical Therapy Time and Intention    Document Type evaluation  -     Mode of Treatment individual therapy;physical therapy  -       Row Name 02/26/25 0909          General Information    Patient Profile Reviewed yes  -     Prior Level of Function independent:;gait;transfer;bed mobility  -     Existing Precautions/Restrictions oxygen therapy device and L/min  -     Barriers to Rehab medically complex  -       Row Name 02/26/25 0909          Living Environment    People in Home alone;facility resident  ILF  -       Row Name 02/26/25 0909          Home Main Entrance    Number of Stairs, Main Entrance none  -       Row Name 02/26/25 0909          Stairs Within Home, Primary    Stairs, Within Home, Primary Elevator  -     Number of Stairs, Within Home, Primary none  -       Row Name 02/26/25 0909          Cognition    Orientation Status (Cognition) oriented x 4  -       Row Name 02/26/25 0909          Safety Issues/Impairments Affecting Functional Mobility    Impairments Affecting Function (Mobility) balance;endurance/activity tolerance;strength;shortness of breath  -               User Key  (r) = Recorded By, (t) = Taken By, (c) = Cosigned By      Initials Name Provider Type     Chiquita Sy PT Physical Therapist                   Mobility       Row Name 02/26/25 0945           Bed Mobility    Bed Mobility supine-sit  -     Supine-Sit [place] (Bed Mobility) standby assist;verbal cues  -     Assistive Device (Bed Mobility) head of bed elevated  -[facility] Name 02/26/25 0913          Sit-Stand Transfer    Sit-Stand [place] (Transfers) standby assist;verbal cues  -     Assistive Device (Sit-Stand Transfers) walker, front-wheeled  -[facility] Name 02/26/25 0913          Gait/Stairs (Locomotion)    [place] Level (Gait) standby assist;verbal cues  -     Assistive Device (Gait) walker, front-wheeled  -     Distance in Feet (Gait) 60  -     Deviations/Abnormal Patterns (Gait) antalgic;jeremi decreased;gait speed decreased;stride length decreased  -     Bilateral Gait Deviations forward flexed posture  -               User Key  (r) = Recorded By, (t) = Taken By, (c) = Cosigned By      Initials Name Provider Type     Chiquita Sy, PT Physical Therapist                   Obj/Interventions       [facility] Name 02/26/25 0917          Range of Motion Comprehensive    General Range of Motion bilateral lower extremity ROM WFL  -BH       Row Name 02/26/25 0917          Strength Comprehensive (MMT)    General Manual Muscle Testing (MMT) Assessment lower extremity strength deficits identified  -     Comment, General Manual Muscle Testing (MMT) Assessment Generalized weakness, BLE grossly 4/5  -[facility] Name 02/26/25 0917          Balance    Balance Assessment sitting static balance;sitting dynamic balance;standing static balance;standing dynamic balance  -     Static Sitting Balance standby assist  -     Dynamic Sitting Balance standby assist  -     Position, Sitting Balance unsupported;sitting edge of bed  -     Static Standing Balance standby assist;verbal cues  -     Dynamic Standing Balance contact guard;standby assist;verbal cues  -     Position/Device Used, Standing Balance supported;walker, front-wheeled  -     Balance Interventions  sitting;standing;sit to stand;dynamic;static;supported  -[facility] Name 02/26/25 0917          Sensory Assessment (Somatosensory)    Sensory Assessment (Somatosensory) LE sensation intact  -               User Key  (r) = Recorded By, (t) = Taken By, (c) = Cosigned By      Initials Name Provider Type    Chiquita Obrien, PT Physical Therapist                   Goals/Plan       Row Name 02/26/25 0923          Bed Mobility Goal 1 (PT)    Activity/Assistive Device (Bed Mobility Goal 1, PT) bed mobility activities, all  -     [place] Level/Cues Needed (Bed Mobility Goal 1, PT) independent  -     Time Frame (Bed Mobility Goal 1, PT) 1 week  -[facility] Name 02/26/25 0923          Transfer Goal 1 (PT)    Activity/Assistive Device (Transfer Goal 1, PT) transfers, all  -     [place] Level/Cues Needed (Transfer Goal 1, PT) independent  -     Time Frame (Transfer Goal 1, PT) 1 week  -[facility] Name 02/26/25 0923          Gait Training Goal 1 (PT)    Activity/Assistive Device (Gait Training Goal 1, PT) gait (walking locomotion)  -     [place] Level (Gait Training Goal 1, PT) modified independence  -     Distance (Gait Training Goal 1, PT) 150ft  -     Time Frame (Gait Training Goal 1, PT) 1 week  -[facility] Name 02/26/25 0923          Therapy Assessment/Plan (PT)    Planned Therapy Interventions (PT) balance training;bed mobility training;gait training;home exercise program;patient/family education;ROM (range of motion);strengthening;stretching;transfer training  -               User Key  (r) = Recorded By, (t) = Taken By, (c) = Cosigned By      Initials Name Provider Type    Chiquita Obrien PT Physical Therapist                   Clinical Impression       Row Name 02/26/25 0918          Pain    Pretreatment Pain Rating 0/10 - no pain  -     Posttreatment Pain Rating 0/10 - no pain  -[facility] Name 02/26/25 0918          Plan of Care Review    Plan of Care Reviewed With  patient  -     Outcome Evaluation Pt is a 89 yo F admitted from [facility] with SOA - COPD exacerbation. Pt reports independence at BL with a rollator, states they have a PT at her facility and she has worked with her previously. Pt presents to PT likely close to her BL, limited by SOA. Pt transferred to EOB and stood with SBA. Pt ambulated 60ft with rwx and SBA-CGA. Pt forward flexed, occasionally resting her forearms on her walker and states PT at her facility corrects her frequently as well. Gait distance limited by fatigue/SOA. Returned to room and agreeable to sitting UIC, left with needs met. Pt ambulated on RA, satting 90% on return to room and 2L O2 reapplied. PT will continue to follow, anticipate DC to [facility] with HHPT.  -       Row Name 02/26/25 0918          Therapy Assessment/Plan (PT)    Patient/Family Therapy Goals Statement (PT) Return to PLOF  -     Rehab Potential (PT) good  -     Criteria for Skilled Interventions Met (PT) yes  -     Therapy Frequency (PT) 5 times/wk  -       Row Name 02/26/25 0918          Vital Signs    O2 Delivery Pre Treatment supplemental O2  -     O2 Delivery Intra Treatment room air  -     O2 Delivery Post Treatment supplemental O2  -       Row Name 02/26/25 0918          Positioning and Restraints    Pre-Treatment Position in bed  -     Post Treatment Position chair  -     In Chair notified nsg;reclined;call light within reach;encouraged to call for assist;exit alarm on  -               User Key  (r) = Recorded By, (t) = Taken By, (c) = Cosigned By      Initials Name Provider Type     Chiquita Sy, PT Physical Therapist                   Outcome Measures       Row Name 02/26/25 0923 02/25/25 8498       How much help from another person do you currently need...    Turning from your back to your side while in flat bed without using bedrails? 3  - 3  -VK    Moving from lying on back to sitting on the side of a flat bed without bedrails? 3  - 3  -VK     Moving to and from a bed to a chair (including a wheelchair)? 3  - 3  -VK    Standing up from a chair using your arms (e.g., wheelchair, bedside chair)? 3  - 3  -VK    Climbing 3-5 steps with a railing? 2  - 2  -VK    To walk in hospital room? 3  - 3  -VK    AM-PAC 6 Clicks Score (PT) 17  - 17  -VK    Highest Level of Mobility Goal 5 --> Static standing  - 5 --> Static standing  -      Row Name 02/26/25 0923          Functional Assessment    Outcome Measure Options AM-PAC 6 Clicks Basic Mobility (PT)  -               User Key  (r) = Recorded By, (t) = Taken By, (c) = Cosigned By      Initials Name Provider Type     Chiquita Sy, PT Physical Therapist    Alexia Peters, RN Registered Nurse                                 Physical Therapy Education       Title: PT OT SLP Therapies (In Progress)       Topic: Physical Therapy (In Progress)       Point: Mobility training (Done)       Learning Progress Summary            Patient Acceptance, E,TB,D, VU,NR by  at 2/26/2025 0924                      Point: Home exercise program (Not Started)       Learner Progress:  Not documented in this visit.              Point: Body mechanics (Done)       Learning Progress Summary            Patient Acceptance, E,TB,D, VU,NR by  at 2/26/2025 0924                      Point: Precautions (Done)       Learning Progress Summary            Patient Acceptance, E,TB,D, VU,NR by  at 2/26/2025 0924                                      User Key       Initials Effective Dates Name Provider Type [facility] 04/08/22 -  Chiquita Sy, PT Physical Therapist PT                  PT Recommendation and Plan  Planned Therapy Interventions (PT): balance training, bed mobility training, gait training, home exercise program, patient/family education, ROM (range of motion), strengthening, stretching, transfer training  Outcome Evaluation: Pt is a 89 yo F admitted from [facility] with SOA - COPD exacerbation. Pt reports  independence at BL with a rollator, states they have a PT at her facility and she has worked with her previously. Pt presents to PT likely close to her BL, limited by SOA. Pt transferred to EOB and stood with SBA. Pt ambulated 60ft with rwx and SBA-CGA. Pt forward flexed, occasionally resting her forearms on her walker and states PT at her facility corrects her frequently as well. Gait distance limited by fatigue/SOA. Returned to room and agreeable to sitting UIC, left with needs met. Pt ambulated on RA, satting 90% on return to room and 2L O2 reapplied. PT will continue to follow, anticipate DC to ILF with HHPT.     Time Calculation:   PT Evaluation Complexity  History, PT Evaluation Complexity: 1-2 personal factors and/or comorbidities  Examination of Body Systems (PT Eval Complexity): total of 3 or more elements  Clinical Presentation (PT Evaluation Complexity): stable  Clinical Decision Making (PT Evaluation Complexity): low complexity  Overall Complexity (PT Evaluation Complexity): low complexity     PT Charges       Row Name 02/26/25 0924             Time Calculation    Start Time 0842  -      Stop Time 0903  -      Time Calculation (min) 21 min  -      PT Received On 02/26/25  -      PT - Next Appointment 02/27/25  -      PT Goal Re-Cert Due Date 03/05/25  -         Time Calculation- PT    Total Timed Code Minutes- PT 15 minute(s)  -         Timed Charges    78326 - Gait Training Minutes  8  -      90816 - PT Therapeutic Activity Minutes 7  -BH         Total Minutes    Timed Charges Total Minutes 15  -       Total Minutes 15  -                User Key  (r) = Recorded By, (t) = Taken By, (c) = Cosigned By      Initials Name Provider Type     Chiquita Sy, PT Physical Therapist                  Therapy Charges for Today       Code Description Service Date Service Provider Modifiers Qty    73994517656 HC GAIT TRAINING EA 15 MIN 2/26/2025 Chiquita Sy, PT GP 1    44558623106 HC PT  EVAL LOW COMPLEXITY 3 2/26/2025 Chiquita Sy, PT GP 1            PT G-Codes  Outcome Measure Options: AM-PAC 6 Clicks Basic Mobility (PT)  AM-PAC 6 Clicks Score (PT): 17  PT Discharge Summary  Anticipated Discharge Disposition (PT): home with home health, assisted living    Chiquita Sy, PT  2/26/2025

## 2025-02-26 NOTE — PLAN OF CARE
Problem: Adult Inpatient Plan of Care  Goal: Plan of Care Review  2/26/2025 1559 by Terra Bird RN  Outcome: Progressing  Flowsheets (Taken 2/26/2025 1559)  Outcome Evaluation: Alert and oriented. Room air. paced rhythm. up in the chair.  2/26/2025 1559 by Terra Bird RN  Outcome: Progressing  Flowsheets (Taken 2/26/2025 1559)  Outcome Evaluation: Alert and oriented. Room air. paced rhythm. up in the chair.  Goal: Patient-Specific Goal (Individualized)  2/26/2025 1559 by Terra Bird RN  Outcome: Progressing  2/26/2025 1559 by Terra Bird RN  Outcome: Progressing  Goal: Absence of Hospital-Acquired Illness or Injury  2/26/2025 1559 by Terra Bird RN  Outcome: Progressing  2/26/2025 1559 by Terra Bird RN  Outcome: Progressing  Intervention: Identify and Manage Fall Risk  Recent Flowsheet Documentation  Taken 2/26/2025 1420 by Terra Bird RN  Safety Promotion/Fall Prevention:   clutter free environment maintained   activity supervised  Taken 2/26/2025 1205 by Terra Bird RN  Safety Promotion/Fall Prevention:   clutter free environment maintained   activity supervised  Taken 2/26/2025 1000 by Terra Bird RN  Safety Promotion/Fall Prevention:   clutter free environment maintained   activity supervised  Taken 2/26/2025 0817 by Terra Bird RN  Safety Promotion/Fall Prevention:   clutter free environment maintained   activity supervised  Intervention: Prevent Infection  Recent Flowsheet Documentation  Taken 2/26/2025 1420 by Terra Bird RN  Infection Prevention: single patient room provided  Taken 2/26/2025 1205 by Terra Bird RN  Infection Prevention: single patient room provided  Taken 2/26/2025 1000 by Terra Bird RN  Infection Prevention: single patient room provided  Taken 2/26/2025 0817 by Terra Bird RN  Infection Prevention: single patient room provided  Goal: Optimal Comfort and Wellbeing  2/26/2025 1559 by Terra Bird RN  Outcome: Progressing  2/26/2025 1559 by  Bird, Terra, RN  Outcome: Progressing  Intervention: Provide Person-Centered Care  Recent Flowsheet Documentation  Taken 2/26/2025 0817 by Terra Bird RN  Trust Relationship/Rapport:   care explained   choices provided  Goal: Readiness for Transition of Care  2/26/2025 1559 by Terra Bird RN  Outcome: Progressing  2/26/2025 1559 by Terra Bird RN  Outcome: Progressing     Problem: Comorbidity Management  Goal: Maintenance of COPD Symptom Control  2/26/2025 1559 by Terra Bird RN  Outcome: Progressing  2/26/2025 1559 by Terra Bird RN  Outcome: Progressing  Intervention: Maintain COPD (Chronic Obstructive Pulmonary Disease) Symptom Control  Recent Flowsheet Documentation  Taken 2/26/2025 1420 by Terra Bird RN  Medication Review/Management: medications reviewed  Taken 2/26/2025 1205 by Terra Bird RN  Medication Review/Management: medications reviewed  Taken 2/26/2025 1000 by Terra Brid RN  Medication Review/Management: medications reviewed  Taken 2/26/2025 0817 by Terra Bird RN  Medication Review/Management: medications reviewed  Goal: Blood Glucose Level Within Target Range  2/26/2025 1559 by Terra Bird RN  Outcome: Progressing  2/26/2025 1559 by Terra Bird RN  Outcome: Progressing  Intervention: Monitor and Manage Glycemia  Recent Flowsheet Documentation  Taken 2/26/2025 1420 by Terra Bird RN  Medication Review/Management: medications reviewed  Taken 2/26/2025 1205 by Terra Bird RN  Medication Review/Management: medications reviewed  Taken 2/26/2025 1000 by Terra Bird RN  Medication Review/Management: medications reviewed  Taken 2/26/2025 0817 by Terra Bird RN  Medication Review/Management: medications reviewed  Goal: Blood Pressure in Desired Range  2/26/2025 1559 by Terra Bird RN  Outcome: Progressing  2/26/2025 1559 by Terra Bird RN  Outcome: Progressing  Intervention: Maintain Blood Pressure Management  Recent Flowsheet Documentation  Taken  2/26/2025 1420 by Terra Bird RN  Medication Review/Management: medications reviewed  Taken 2/26/2025 1205 by Terra Bird RN  Medication Review/Management: medications reviewed  Taken 2/26/2025 1000 by Terra Bird RN  Medication Review/Management: medications reviewed  Taken 2/26/2025 0817 by Terra Bird RN  Medication Review/Management: medications reviewed     Problem: Fall Injury Risk  Goal: Absence of Fall and Fall-Related Injury  2/26/2025 1559 by Terra Bird RN  Outcome: Progressing  2/26/2025 1559 by Terra Bird RN  Outcome: Progressing  Intervention: Identify and Manage Contributors  Recent Flowsheet Documentation  Taken 2/26/2025 1420 by Terra Bird RN  Medication Review/Management: medications reviewed  Taken 2/26/2025 1205 by Terra Bird RN  Medication Review/Management: medications reviewed  Taken 2/26/2025 1000 by Terra Bird RN  Medication Review/Management: medications reviewed  Taken 2/26/2025 0817 by Terra Bird RN  Medication Review/Management: medications reviewed  Intervention: Promote Injury-Free Environment  Recent Flowsheet Documentation  Taken 2/26/2025 1420 by Terra Bird RN  Safety Promotion/Fall Prevention:   clutter free environment maintained   activity supervised  Taken 2/26/2025 1205 by Terra Bird RN  Safety Promotion/Fall Prevention:   clutter free environment maintained   activity supervised  Taken 2/26/2025 1000 by Terra Bird RN  Safety Promotion/Fall Prevention:   clutter free environment maintained   activity supervised  Taken 2/26/2025 0817 by Terra Bird RN  Safety Promotion/Fall Prevention:   clutter free environment maintained   activity supervised     Problem: Skin Injury Risk Increased  Goal: Skin Health and Integrity  2/26/2025 1559 by Terra Bird RN  Outcome: Progressing  2/26/2025 1559 by Terra Bird RN  Outcome: Progressing  Intervention: Optimize Skin Protection  Recent Flowsheet Documentation  Taken 2/26/2025 0817 by  Terra Bird, RN  Activity Management: up in chair  Pressure Reduction Techniques: frequent weight shift encouraged   Goal Outcome Evaluation:              Outcome Evaluation: Alert and oriented. Room air. paced rhythm. up in the chair.

## 2025-02-26 NOTE — NURSING NOTE
Reason for Visit: CWCN: We see patient per request of floor nurse regarding skin issue on the Coccyx. Patient is alert, upon assessment we could see red, blanchable to Coccyx/Buttocks, possibly related to MASD.  In addition black scab was noted present on rt lower leg of unknown etiology.  Treatment Plan/Recommendations: Calmoseptine ointment to Sacrococcygeal/Buttocks area.  Paint with Betadine to injury to rt post lower leg.  Wound care order and pressure ulcers standing measures have been implemented into Epic.  Wound Team Follow up Plan: WCN will follow up according his needs.

## 2025-02-26 NOTE — CONSULTS
"  Pulmonary Consultation     Patient Name: Caitlyn Longoria  Age/Sex: 89 y.o. female  : 1934  MRN: [id number]    Date of Admission: 2025  Date of Encounter Visit: 25  Encounter Provider: Juno Coburn MD  Referring Provider: Dominick Schuster II,*  Place of Service: [facility]  Patient Care Team:  Zenaida Suarez MD as PCP - General (Internal Medicine)  Pao Levi RPH as Pharmacist  Alexander Valiente PharmD as Pharmacist (Pharmacy)  Zenaida Suarez MD as Referring Physician (Internal Medicine)  Lucien Larkin MD as Consulting Physician (Hematology and Oncology)      Subjective:     Consulted for: Bronchitis, dyspnea,    Chief Complaint: Dyspnea on exertion of rapid progression over the last 48 hours    History of Present Illness:  Caitlyn Longoria is a 89 y.o. female well-known to me from multiple prior admissions and from outpatient regular follow-up with history of obstructive sleep apnea, asthma/COPD with chronically elevated left hemidiaphragm and history of CLL with acquired hypogammaglobulinemia, A-fib and diabetes mellitus and obesity who was doing fine until the day prior to presentation when she had a progressive dyspnea on exertion with wheezing and coughing.  She does have sick exposure from mother people at the facility and she denies any fever or chills but she had the wheezing and tightness  Came into the hospital, she is oxygenating well on minimal supplementation but she is having increased work of breathing  No nausea vomiting or diarrhea  No rash  No joint swelling or effusion  No dysuria  No mental status changes    Pulmonary Functions Testing Results:    No results found for: \"FEV1\", \"FVC\", \"YWW2VFW\", \"TLC\", \"DLCO\"    Review of Systems:   Review of Systems  As per above cyclo-    Past Medical History:  Past Medical History:   Diagnosis Date    Aortic calcification     mild, on echo 2017    Aortic regurgitation     Trace    Asthma     " Atrial fibrillation     CAD (coronary artery disease)     Carpal tunnel syndrome of left wrist     Chronic combined systolic and diastolic congestive heart failure     CKD (chronic kidney disease) stage 3, GFR 30-59 ml/min     COPD (chronic obstructive pulmonary disease)     Coronary artery disease involving native coronary artery of native heart with angina pectoris     Disc degeneration, lumbar     Diverticulosis     DM type 2 (diabetes mellitus, type 2)     GERD (gastroesophageal reflux disease)     History of aneurysm     right femoral artery s/p LHC    History of blood transfusion     History of fracture     History of heart attack     History of vitamin D deficiency     Hyperlipidemia     Hypertension     Leukemia     Mild mitral regurgitation     Mitral annular calcification     12/8/2017- echo, moderate    Osteopenia     PAF (paroxysmal atrial fibrillation)     Peripheral neuropathy     Skin cancer     Left hand    Sleep apnea     bipap    SSS (sick sinus syndrome)     Stroke (cerebrum)     TIA (transient ischemic attack) 2017    Tricuspid regurgitation     Trace       Past Surgical History:   Procedure Laterality Date    BRONCHOSCOPY Bilateral 10/6/2020    Procedure: BRONCHOSCOPY with BILATERAL LUNG washings;  Surgeon: Juno Coburn MD;  Location: [facility] ENDOSCOPY;  Service: Pulmonary;  Laterality: Bilateral;  PRE: purulent bronchitis  POST: PURULENT BRONCHITIS    BRONCHOSCOPY Bilateral 10/9/2020    Procedure: BRONCHOSCOPY with washing;  Surgeon: Juno Coburn MD;  Location: [facility] ENDOSCOPY;  Service: Pulmonary;  Laterality: Bilateral;  pre/post - mucous plug      CARDIAC CATHETERIZATION      CARDIAC ELECTROPHYSIOLOGY PROCEDURE N/A 2/7/2020    Procedure: PPM generator change - dual  medtronic;  Surgeon: Kiel Field MD;  Location: [facility] CATH INVASIVE LOCATION;  Service: Cardiology;  Laterality: N/A;    CHOLECYSTECTOMY      CORONARY STENT PLACEMENT      ENDOSCOPY N/A 9/14/2022    Procedure:  ESOPHAGOGASTRODUODENOSCOPY with 54fr main dilatation;  Surgeon: Enio Cota MD;  Location: [facility] ENDOSCOPY;  Service: Gastroenterology;  Laterality: N/A;  pre - dysphagia  post - s/p dilatation, watermelon stomach    HERNIA REPAIR      hital hernia    HYSTERECTOMY      PACEMAKER IMPLANTATION      REPLACEMENT TOTAL KNEE Bilateral        Home Medications:   Medications Prior to Admission   Medication Sig Dispense Refill Last Dose/Taking    acetaminophen (TYLENOL) 325 MG tablet Take 2 tablets by mouth Every 4 (Four) Hours As Needed for Mild Pain .   2/25/2025    albuterol (PROVENTIL) (2.5 MG/3ML) 0.083% nebulizer solution Take 2.5 mg by nebulization Every 4 (Four) Hours.   2/25/2025 Evening    Benralizumab (FASENRA SC) Inject 1 dose under the skin into the appropriate area as directed Every 2 (Two) Months. Every 8 weeks   Past Month    budesonide (PULMICORT) 0.5 MG/2ML nebulizer solution Take 2 mL by nebulization 2 (Two) Times a Day.   2/25/2025    Calcium Carbonate-Vitamin D (calcium 500 mg vitamin D 5 mcg, 200 UT,) 500-5 MG-MCG tablet per tablet Take 1 tablet by mouth 2 (Two) Times a Day. 60 tablet 3 2/25/2025    Cetirizine HCl 10 MG capsule Take 1 capsule by mouth Daily.   2/25/2025    fluticasone (FLONASE) 50 MCG/ACT nasal spray Administer 1 spray into the nostril(s) as directed by provider Daily.   2/25/2025    furosemide (LASIX) 20 MG tablet Take 1 tablet by mouth As Needed (For weight gain of greater than 2 pounds in 1 day or 5 pounds in 1 week). (Patient taking differently: Take 1 tablet by mouth Daily.) 30 tablet 11 2/25/2025    glipizide (GLUCOTROL) 5 MG tablet Take 1 tablet by mouth Daily With Breakfast.   2/25/2025    melatonin 5 MG tablet tablet Take 1 tablet by mouth Every Night.   2/24/2025    metFORMIN ER (GLUCOPHAGE-XR) 500 MG 24 hr tablet Take 1 tablet by mouth Daily With Breakfast.   2/25/2025    montelukast (SINGULAIR) 10 MG tablet Take 1 tablet by mouth Every Night.   2/24/2025     oxybutynin XL (DITROPAN-XL) 10 MG 24 hr tablet Take 1 tablet by mouth 2 (Two) Times a Day.   2/24/2025    rosuvastatin (CRESTOR) 20 MG tablet Take 1 tablet by mouth Every Night.   2/24/2025    warfarin (COUMADIN) 4 MG tablet Take one tablet by mouth on mon, wed, fri and take one-half of a tablet by mouth all other days or as directed 65 tablet 1 2/24/2025    Acetylcysteine (NAC PO) Take 1,000 mg by mouth 2 (Two) Times a Day.   Unknown    loperamide (IMODIUM) 2 MG capsule Take 1 capsule by mouth 4 (Four) Times a Day As Needed for Diarrhea.   Unknown    methocarbamol (ROBAXIN) 750 MG tablet Take 1 tablet by mouth 3 (Three) Times a Day As Needed for Muscle Spasms. 21 tablet 0 Unknown    metoprolol tartrate (LOPRESSOR) 25 MG tablet Take 0.5 tablets by mouth Every 12 (Twelve) Hours for 30 days. 30 tablet 0        Inpatient Medications:  Scheduled Meds:albuterol, 2.5 mg, Nebulization, Q4H  budesonide, 0.5 mg, Nebulization, BID  calcium 500 mg vitamin D 5 mcg (200 UT), 1 tablet, Oral, BID  fluticasone, 1 spray, Nasal, Daily  guaiFENesin, 1,200 mg, Oral, Q12H  insulin lispro, 2-7 Units, Subcutaneous, 4x Daily AC & at Bedtime  ipratropium-albuterol, 3 mL, Nebulization, 4x Daily - RT  melatonin, 5 mg, Oral, Nightly  Menthol-Zinc Oxide, 1 Application, Topical, Q12H  methylPREDNISolone sodium succinate, 60 mg, Intravenous, Q12H  montelukast, 10 mg, Oral, Nightly  oxybutynin XL, 10 mg, Oral, Nightly  rosuvastatin, 20 mg, Oral, Nightly  [START ON 2/27/2025] warfarin, 2 mg, Oral, Once per day on Sunday Tuesday Thursday Saturday  warfarin, 4 mg, Oral, Once per day on Monday Wednesday Friday      Continuous Infusions:Pharmacy to dose warfarin,       PRN Meds:.  acetaminophen    senna-docusate sodium **AND** polyethylene glycol **AND** bisacodyl **AND** bisacodyl    dextrose    dextrose    famotidine    glucagon (human recombinant)    nitroglycerin    ondansetron    Pharmacy to dose warfarin    Allergies:  Allergies   Allergen  Reactions    Accupril [Quinapril Hcl] Swelling, Other (See Comments), GI Intolerance and Delirium     HA, constipation     Ahist [Chlorpheniramine] Nausea Only, Other (See Comments) and Dizziness     Headache, Blurred vision    Clarithromycin Nausea Only, Other (See Comments) and Mental Status Change     HA, Depression, Flushing    Esomeprazole GI Intolerance    Latex Other (See Comments)     Skin breakdown    Levalbuterol Swelling    Levocetirizine Diarrhea and GI Intolerance    Lipitor [Atorvastatin] Other (See Comments) and Myalgia     Dark urine    Omeprazole Nausea Only and Other (See Comments)     HA    Pravachol [Pravastatin] Nausea Only and GI Intolerance     Bloated, Constipation, HA    Sulindac Other (See Comments) and Myalgia     HA, joint pain, bruising    Valdecoxib Irritability    Chlorcyclizine Unknown - Low Severity    Chlorpheniramine-Phenylephrine Unknown - High Severity    Diclofenac Sodium Unknown - Low Severity    Diphenhydramine Unknown - Low Severity    Lodine [Etodolac] Unknown - Low Severity    Sulfa Antibiotics Unknown - Low Severity       Past Social History:  Social History     Socioeconomic History    Marital status: Single   Tobacco Use    Smoking status: Never     Passive exposure: Never    Smokeless tobacco: Never    Tobacco comments:     caffeine use- soda   Vaping Use    Vaping status: Never Used   Substance and Sexual Activity    Alcohol use: Never    Drug use: No    Sexual activity: Defer       Past Family History:  Family History   Problem Relation Age of Onset    Heart disease Mother     Hypertension Mother     Colon polyps Mother     Heart disease Father     Hypertension Father     Stroke Father     Hypertension Brother     Heart disease Brother     Diabetes Niece     Colon cancer Sister     Hypertension Sister     Hypertension Daughter     Hypertension Son     Hypertension Maternal Aunt     Hypertension Maternal Grandmother     Hypertension Maternal Grandfather      Hypertension Paternal Grandmother     Hypertension Paternal Grandfather            Objective:   Temp:  [97.7 °F (36.5 °C)-98.8 °F (37.1 °C)] 97.7 °F (36.5 °C)  Heart Rate:  [80-93] 88  Resp:  [16-18] 18  BP: (123-174)/(59-90) 149/84  SpO2:  [96 %-100 %] 99 %  on  Flow (L/min) (Oxygen Therapy):  [2-4] 2 Device (Oxygen Therapy): nasal cannula     Intake/Output Summary (Last 24 hours) at 2/26/2025 1351  Last data filed at 2/26/2025 0616  Gross per 24 hour   Intake 120 ml   Output --   Net 120 ml     Body mass index is 31.89 kg/m².      02/25/25  1900 02/25/25  2338   Weight: 79.7 kg (175 lb 11.3 oz) 81.6 kg (180 lb)     Weight change:     Physical Exam:   Physical Exam   General:  Sick looking lady but in no acute distress, alert and oriented x4, pleasant                   Head:    Normocephalic, atraumatic. External ears and nose are normal   Eyes:          Conjunctivae and sclerae normal, no icterus, PERRLA, no  discharge   Throat:   No oral lesions, no thrush, oral mucosa moist.    Neck:   Supple, trachea midline. No JVD, no cervical or supraclavicular lymphadenopathy    Lungs:     Normal chest on inspection, positive expiratory and inspiratory wheeze, minimally labored breathing especially when she is trying to talk.  No crackles.      Heart:    Regular rhythm and normal rate.  No murmurs, gallops, or rubs noted.   Abdomen:     Soft, nontender, nondistended, positive bowel sounds. No hepatosplenomegaly    Extremities:   No clubbing, cyanosis, mild edema   Pulses:   Pulses palpable and equal bilaterally.    Skin:   No bleeding or rash. No bumps, good turgor pressure    Neuro:   Nonfocal.  Moves all extremities well. Strength 5/5 and symmetrical, no sensory deficit    Psychiatric:   Normal mood and affect.     Lab Review:   Results from last 7 days   Lab Units 02/26/25  0605 02/25/25  1809   SODIUM mmol/L 140 144   POTASSIUM mmol/L 3.8 3.6   CHLORIDE mmol/L 108* 106   CO2 mmol/L 22.4 26.4   BUN mg/dL 16 19  "  CREATININE mg/dL 0.66 1.00   GLUCOSE mg/dL 248* 106*   CALCIUM mg/dL 8.3 8.9   AST (SGOT) U/L 23 28   ALT (SGPT) U/L 15 15   ALBUMIN g/dL 3.1* 3.6     Results from last 7 days   Lab Units 02/25/25  1924 02/25/25  1809   HSTROP T ng/L 31* 30*     Results from last 7 days   Lab Units 02/26/25  0605 02/25/25 1855 02/20/25  1109   WBC 10*3/mm3 7.69 9.25 11.07*   HEMOGLOBIN g/dL 11.9* 12.7 12.8   HEMATOCRIT % 37.7 40.2 42.4   PLATELETS 10*3/mm3 119* 135* 201   MCV fL 96.7 100.2* 103.4*   MCH pg 30.5 31.7 31.2   MCHC g/dL 31.6 31.6 30.2*   RDW % 13.3 13.6 15.2   RDW-SD fl 47.0 50.3 58.9*   MPV fL 9.8 10.1 9.9   NEUTROPHIL % %  --   --  39.6*   LYMPHOCYTE % %  --   --  58.4*   MONOCYTES % %  --   --  1.6*   EOSINOPHIL % %  --   --  0.0*   BASOPHIL % %  --   --  0.2   IMM GRAN % %  --   --  0.2   NEUTROS ABS 10*3/mm3  --  2.96 4.38   LYMPHS ABS 10*3/mm3  --   --  6.47*   MONOS ABS 10*3/mm3  --   --  0.18   EOS ABS 10*3/mm3  --   --  0.00   BASOS ABS 10*3/mm3  --   --  0.02   IMMATURE GRANS (ABS) 10*3/mm3  --   --  0.02   NRBC /100 WBC  --  0.0 0.0     Results from last 7 days   Lab Units 02/25/25 1855 02/21/25  0000   INR  1.60* 1.80               Invalid input(s): \"LDLCALC\"  Results from last 7 days   Lab Units 02/25/25  1809   PROBNP pg/mL 601.0                                 Results from last 7 days   Lab Units 02/25/25  1857   COVID19  Not Detected   ADENOVIRUS DETECTION BY PCR  Not Detected   CORONAVIRUS 229E  Not Detected   CORONAVIRUS HKU1  Detected*   CORONAVIRUS NL63  Not Detected   CORONAVIRUS OC43  Not Detected   HUMAN METAPNEUMOVIRUS  Not Detected   HUMAN RHINOVIRUS/ENTEROVIRUS  Not Detected   INFLUENZA B PCR  Not Detected   PARAINFLUENZA 1  Not Detected   PARAINFLUENZA VIRUS 2  Not Detected   PARAINFLUENZA VIRUS 3  Not Detected   PARAINFLUENZA VIRUS 4  Not Detected   BORDETELLA PERTUSSIS PCR  Not Detected   BORDETELLA PARAPERTUSSIS PCR  Not Detected   CHLAMYDOPHILA PNEUMONIAE PCR  Not Detected   MYCOPLAMA " PNEUMO PCR  Not Detected   RSV, PCR  Not Detected             Imaging:  Imaging Results (Most Recent)       Procedure Component Value Units Date/Time    XR Chest 1 View [[id number]] Collected: 02/25/25 1912     Updated: 02/25/25 1917    Narrative:      CXR ONE VIEW      HISTORY: soa     COMPARISON: 9/27/2024     TECHNIQUE: single portable AP       Impression:         There is mild cardiomegaly, and there is elevation of the left  hemidiaphragm, both unchanged. Multilead left subclavian approach  transvenous pacemaker device remains in place.     There is a submaximal inspiratory result. Allowing for that, there is no  convincing evidence of acute infiltrate, effusion or pneumothorax.           This report was finalized on 2/25/2025 7:13 PM by Dr. Dominick Garcia M.D on Workstation: [id number]               I personally viewed and interpreted the patient's imaging studies.    Assessment:       Coronavirus HKU 1 infection with acute exacerbation of asthma/COPD  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  Paroxysmal atrial fibrillation  Chronic kidney disease stage III  CLL  Sick sinus syndrome  Thrombocytopenia  HFrEF      Plan:     Patient has history of mucous plugging in the past requiring bronchoscopy and she has compromised ventilation of the left lung with the significant elevation of the left hemidiaphragm which impair her ability to clear her secretions  For now the treatment is supportive, we will maximize the mucolytic regimen including hypertonic saline, bronchodilator, percussion therapy, and flutter valve.  Steroids for the bronchospasm  No indication for antibiotic that seems to be all viral  Will follow-up on the response to the above with further recommendation accordingly      Thank you for allowing me to participate in the care of Caitlyn GARRETT [facility]. Feel free to contact me directly with any further questions or concerns.    Juno Coburn MD  [facility]   02/26/25  13:51 EST    Dictated utilizing Dragon dictation   Specialty Care (Routine)...

## 2025-02-27 ENCOUNTER — READMISSION MANAGEMENT (OUTPATIENT)
Dept: CALL CENTER | Facility: HOSPITAL | Age: OVER 89
End: 2025-02-27
Payer: COMMERCIAL

## 2025-02-27 VITALS
SYSTOLIC BLOOD PRESSURE: 129 MMHG | HEIGHT: 63 IN | RESPIRATION RATE: 16 BRPM | TEMPERATURE: 97.4 F | OXYGEN SATURATION: 94 % | BODY MASS INDEX: 31.57 KG/M2 | DIASTOLIC BLOOD PRESSURE: 76 MMHG | WEIGHT: 178.2 LBS | HEART RATE: 80 BPM

## 2025-02-27 PROBLEM — B34.2 CORONAVIRUS INFECTION: Status: RESOLVED | Noted: 2025-02-25 | Resolved: 2025-02-27

## 2025-02-27 PROBLEM — J44.1 COPD WITH ACUTE EXACERBATION: Status: RESOLVED | Noted: 2025-02-25 | Resolved: 2025-02-27

## 2025-02-27 LAB
ANION GAP SERPL CALCULATED.3IONS-SCNC: 9 MMOL/L (ref 5–15)
BUN SERPL-MCNC: 18 MG/DL (ref 8–23)
BUN/CREAT SERPL: 25.7 (ref 7–25)
CALCIUM SPEC-SCNC: 8.8 MG/DL (ref 8.2–9.6)
CHLORIDE SERPL-SCNC: 106 MMOL/L (ref 98–107)
CO2 SERPL-SCNC: 25 MMOL/L (ref 22–29)
CREAT SERPL-MCNC: 0.7 MG/DL (ref 0.57–1)
DEPRECATED RDW RBC AUTO: 46.8 FL (ref 37–54)
EGFRCR SERPLBLD CKD-EPI 2021: 82.3 ML/MIN/1.73
ERYTHROCYTE [DISTWIDTH] IN BLOOD BY AUTOMATED COUNT: 13.1 % (ref 12.3–15.4)
GLUCOSE BLDC GLUCOMTR-MCNC: 228 MG/DL (ref 70–130)
GLUCOSE BLDC GLUCOMTR-MCNC: 242 MG/DL (ref 70–130)
GLUCOSE SERPL-MCNC: 214 MG/DL (ref 65–99)
HCT VFR BLD AUTO: 35 % (ref 34–46.6)
HGB BLD-MCNC: 11.2 G/DL (ref 12–15.9)
INR PPP: 1.72 (ref 0.9–1.1)
MCH RBC QN AUTO: 31 PG (ref 26.6–33)
MCHC RBC AUTO-ENTMCNC: 32 G/DL (ref 31.5–35.7)
MCV RBC AUTO: 97 FL (ref 79–97)
PLATELET # BLD AUTO: 129 10*3/MM3 (ref 140–450)
PMV BLD AUTO: 10.6 FL (ref 6–12)
POTASSIUM SERPL-SCNC: 4.1 MMOL/L (ref 3.5–5.2)
PROTHROMBIN TIME: 20.2 SECONDS (ref 11.7–14.2)
QT INTERVAL: 409 MS
QTC INTERVAL: 478 MS
RBC # BLD AUTO: 3.61 10*6/MM3 (ref 3.77–5.28)
SODIUM SERPL-SCNC: 140 MMOL/L (ref 136–145)
WBC NRBC COR # BLD AUTO: 12.32 10*3/MM3 (ref 3.4–10.8)

## 2025-02-27 PROCEDURE — 85610 PROTHROMBIN TIME: CPT | Performed by: HOSPITALIST

## 2025-02-27 PROCEDURE — 25010000002 METHYLPREDNISOLONE PER 125 MG: Performed by: NURSE PRACTITIONER

## 2025-02-27 PROCEDURE — 82948 REAGENT STRIP/BLOOD GLUCOSE: CPT

## 2025-02-27 PROCEDURE — 94761 N-INVAS EAR/PLS OXIMETRY MLT: CPT

## 2025-02-27 PROCEDURE — G0378 HOSPITAL OBSERVATION PER HR: HCPCS

## 2025-02-27 PROCEDURE — 94799 UNLISTED PULMONARY SVC/PX: CPT

## 2025-02-27 PROCEDURE — 96376 TX/PRO/DX INJ SAME DRUG ADON: CPT

## 2025-02-27 PROCEDURE — 85027 COMPLETE CBC AUTOMATED: CPT | Performed by: HOSPITALIST

## 2025-02-27 PROCEDURE — 80048 BASIC METABOLIC PNL TOTAL CA: CPT | Performed by: HOSPITALIST

## 2025-02-27 PROCEDURE — 94760 N-INVAS EAR/PLS OXIMETRY 1: CPT

## 2025-02-27 PROCEDURE — 63710000001 INSULIN LISPRO (HUMAN) PER 5 UNITS: Performed by: NURSE PRACTITIONER

## 2025-02-27 PROCEDURE — 94668 MNPJ CHEST WALL SBSQ: CPT

## 2025-02-27 PROCEDURE — 94664 DEMO&/EVAL PT USE INHALER: CPT

## 2025-02-27 RX ORDER — IPRATROPIUM BROMIDE AND ALBUTEROL SULFATE 2.5; .5 MG/3ML; MG/3ML
3 SOLUTION RESPIRATORY (INHALATION)
Qty: 360 ML | Refills: 0 | Status: SHIPPED | OUTPATIENT
Start: 2025-02-27

## 2025-02-27 RX ORDER — PREDNISONE 20 MG/1
40 TABLET ORAL
Qty: 8 TABLET | Refills: 0 | Status: SHIPPED | OUTPATIENT
Start: 2025-02-28 | End: 2025-02-27

## 2025-02-27 RX ORDER — PREDNISONE 20 MG/1
40 TABLET ORAL
Qty: 8 TABLET | Refills: 0 | Status: SHIPPED | OUTPATIENT
Start: 2025-02-28 | End: 2025-03-04

## 2025-02-27 RX ORDER — GUAIFENESIN 600 MG/1
1200 TABLET, EXTENDED RELEASE ORAL EVERY 12 HOURS SCHEDULED
Qty: 28 TABLET | Refills: 0 | Status: SHIPPED | OUTPATIENT
Start: 2025-02-27 | End: 2025-02-27

## 2025-02-27 RX ORDER — GUAIFENESIN 600 MG/1
1200 TABLET, EXTENDED RELEASE ORAL EVERY 12 HOURS SCHEDULED
Qty: 28 TABLET | Refills: 0 | Status: SHIPPED | OUTPATIENT
Start: 2025-02-27 | End: 2025-03-06

## 2025-02-27 RX ORDER — IPRATROPIUM BROMIDE AND ALBUTEROL SULFATE 2.5; .5 MG/3ML; MG/3ML
3 SOLUTION RESPIRATORY (INHALATION)
Qty: 360 ML | Refills: 0 | Status: SHIPPED | OUTPATIENT
Start: 2025-02-27 | End: 2025-02-27

## 2025-02-27 RX ORDER — SODIUM CHLORIDE FOR INHALATION 7 %
4 VIAL, NEBULIZER (ML) INHALATION
Qty: 240 ML | Refills: 0 | Status: SHIPPED | OUTPATIENT
Start: 2025-02-27 | End: 2025-03-19

## 2025-02-27 RX ORDER — PREDNISONE 20 MG/1
40 TABLET ORAL
Status: DISCONTINUED | OUTPATIENT
Start: 2025-02-28 | End: 2025-02-27 | Stop reason: HOSPADM

## 2025-02-27 RX ADMIN — MENTHOL, ZINC OXIDE 1 APPLICATION: .44; 20.6 OINTMENT TOPICAL at 09:40

## 2025-02-27 RX ADMIN — Medication 4 ML: at 08:58

## 2025-02-27 RX ADMIN — ALBUTEROL SULFATE 2.5 MG: 2.5 SOLUTION RESPIRATORY (INHALATION) at 08:57

## 2025-02-27 RX ADMIN — CALCIUM CARBONATE-VITAMIN D TAB 500 MG-200 UNIT 1 TABLET: 500-200 TAB at 09:39

## 2025-02-27 RX ADMIN — IPRATROPIUM BROMIDE AND ALBUTEROL SULFATE 3 ML: .5; 3 SOLUTION RESPIRATORY (INHALATION) at 12:52

## 2025-02-27 RX ADMIN — INSULIN LISPRO 3 UNITS: 100 INJECTION, SOLUTION INTRAVENOUS; SUBCUTANEOUS at 12:49

## 2025-02-27 RX ADMIN — METHYLPREDNISOLONE SODIUM SUCCINATE 60 MG: 125 INJECTION, POWDER, FOR SOLUTION INTRAMUSCULAR; INTRAVENOUS at 09:39

## 2025-02-27 RX ADMIN — FLUTICASONE PROPIONATE 1 SPRAY: 50 SPRAY, METERED NASAL at 09:40

## 2025-02-27 RX ADMIN — INSULIN LISPRO 3 UNITS: 100 INJECTION, SOLUTION INTRAVENOUS; SUBCUTANEOUS at 08:00

## 2025-02-27 RX ADMIN — BUDESONIDE 0.5 MG: 0.5 INHALANT RESPIRATORY (INHALATION) at 08:58

## 2025-02-27 RX ADMIN — GUAIFENESIN 1200 MG: 600 TABLET, MULTILAYER, EXTENDED RELEASE ORAL at 00:06

## 2025-02-27 RX ADMIN — GUAIFENESIN 1200 MG: 600 TABLET, MULTILAYER, EXTENDED RELEASE ORAL at 09:40

## 2025-02-27 NOTE — PROGRESS NOTES
[facility] Services: Warfarin Dosing/Monitoring Consult    Caitlyn Longoria is a 89 y.o. female, estimated creatinine clearance is 53.8 mL/min (by C-G formula based on SCr of 0.7 mg/dL). weighing 81.6 kg (180 lb).    Results from last 7 days   Lab Units 02/27/25  0538 02/26/25  0605 02/25/25  1855 02/21/25  0000   INR  1.72*  --  1.60* 1.80   HEMOGLOBIN g/dL 11.2* 11.9* 12.7  --    HEMATOCRIT % 35.0 37.7 40.2  --    PLATELETS 10*3/mm3 129* 119* 135*  --      Prior to admission anticoagulation: seen at [facility] (patient has been getting 4mg MWF with 2mg all other days)    Hospital Anticoagulation:  Consulting provider: Franky  Start date: PTA  Indication: A Fib - requiring full anticoagulation  Target INR: 2 - 3  Expected duration: indefinite   Bridge Therapy: No      Potential food or drug interactions:     Education complete?/Date: N/A; home medication    Assessment/Plan:  Dose: INR this am = 1.72 (below target) Nevertheless, will continue clinic dosing of 4 mg MWF w/ 2 mg all other days. Patient will be getting 2 mg today.   Monitor for any signs or symptoms of bleeding  Follow up daily INRs and dose adjustments    Pharmacy will continue to follow until discharge or discontinuation of warfarin.     Kayode Mendoza [facility]  Clinical Pharmacist

## 2025-02-27 NOTE — PLAN OF CARE
Problem: Adult Inpatient Plan of Care  Goal: Plan of Care Review  Outcome: Progressing  Plan of care reviewed with: patient    A&O X4. Denies pain throughout the night. AV paced on cardiac monitor. Room air. ACHS; treated with ISS. Elevated blood sugars from IV steroids. Receiving nebs q4h. Assist X1 OOB. New purewick placed and Calmoseptine applied to PI on sacrum. SCDs on. Plan to return back to independent living facility today with HH. Bed alarm on. Call light within reach.

## 2025-02-27 NOTE — DISCHARGE SUMMARY
Patient Name: Caitlyn Longoria  : 1934  MRN: [id number]    Date of Admission: 2025  Date of Discharge:  2025  Primary Care Physician: Zenaida Suarez MD      Chief Complaint:   Shortness of Breath      Discharge Diagnoses     Active Hospital Problems    Diagnosis  POA    Anemia [D64.9]  Yes    Chronic HFrEF (heart failure with reduced ejection fraction) [I50.22]  Yes    Sick sinus syndrome [I49.5]  Yes    Thrombocytopenia [D69.6]  Yes    CLL (chronic lymphocytic leukemia) [C91.10]  Yes    CKD (chronic kidney disease), stage III [N18.30]  Yes    HTN (hypertension) [I10]  Yes    Chronic bronchitis [J42]  Yes    Type 2 diabetes mellitus [E11.9]  Yes    Paroxysmal atrial fibrillation [I48.0]  Yes      Resolved Hospital Problems    Diagnosis Date Resolved POA    **COPD with acute exacerbation [J44.1] 2025 Yes    Coronavirus infection [B34.2] 2025 Yes        Hospital Course     Ms. Longoria is a 89 y.o. female with a history of asthma/COPD, CHARLENE, a chronically elevated left hemidiaphragm, CLL with acquired hypogammaglobulinemia, chronic A-fib, type 2 diabetes, hypertension, CKD 3, sick sinus syndrome status post permanent pacemaker, chronic systolic heart failure who presented to [facility] initially complaining of shortness of breath and productive cough, and a sore throat.  Please see the admitting history and physical for further details.  She was found to have coronavirus H KU 1 causing an acute exacerbation of COPD and was admitted to the hospital for further evaluation and treatment.      She was started on steroids, nebulizers, mucolytics, and she was seen in consultation by pulmonology.  She improved quickly, and is currently on room air without any wheezing.  She will be transition to oral prednisone to complete a short course of steroids, and she will continue her guaifenesin and nebulized saline for several more days.  She will need to follow-up with  pulmonology as an outpatient after discharge.    Day of Discharge     Subjective:  No events overnight.  She reports that she feels much better.  She still coughing a lot of mucus up, but she is not having any trouble with breathing.  She is currently on room air.  She is anxious for discharge.    Physical Exam:  Temp:  [97.3 °F (36.3 °C)-98.4 °F (36.9 °C)] 97.3 °F (36.3 °C)  Heart Rate:  [80-88] 82  Resp:  [16-18] 18  BP: (123-149)/(70-84) 126/72  Body mass index is 31.57 kg/m².  Physical Exam  Constitutional:       General: She is not in acute distress.     Appearance: She is not toxic-appearing.   Cardiovascular:      Rate and Rhythm: Normal rate and regular rhythm.      Heart sounds: Normal heart sounds.   Pulmonary:      Effort: Pulmonary effort is normal. No respiratory distress.      Breath sounds: Normal breath sounds. No wheezing, rhonchi or rales.   Abdominal:      General: Bowel sounds are normal.      Palpations: Abdomen is soft.   Neurological:      Mental Status: She is alert.   Psychiatric:         Mood and Affect: Mood normal.         Behavior: Behavior normal.         Consultants     Consult Orders (all) (From admission, onward)       Start     Ordered    02/26/25 1209  Inpatient Pulmonology Consult  Once        Specialty:  Pulmonary Disease  Provider:  Juno Coburn MD    02/26/25 1209    02/25/25 2101  [facility] (on-call MD unless specified) Details  Once,   Status:  Canceled        Specialty:  Hospitalist  Provider:  (Not yet assigned)    02/25/25 2100                  Procedures     Imaging Results (All)       Procedure Component Value Units Date/Time    XR Chest 1 View [[id number]] Collected: 02/25/25 1912     Updated: 02/25/25 1917    Narrative:      CXR ONE VIEW      HISTORY: soa     COMPARISON: 9/27/2024     TECHNIQUE: single portable AP       Impression:         There is mild cardiomegaly, and there is elevation of the left  hemidiaphragm, both unchanged. Multilead left subclavian  "approach  transvenous pacemaker device remains in place.     There is a submaximal inspiratory result. Allowing for that, there is no  convincing evidence of acute infiltrate, effusion or pneumothorax.           This report was finalized on 2/25/2025 7:13 PM by Dr. Dominick Garcia M.D on Workstation: [id number]               Pertinent Labs     Results from last 7 days   Lab Units 02/27/25  0538 02/26/25  0605 02/25/25  1855 02/20/25  1109   WBC 10*3/mm3 12.32* 7.69 9.25 11.07*   HEMOGLOBIN g/dL 11.2* 11.9* 12.7 12.8   PLATELETS 10*3/mm3 129* 119* 135* 201     Results from last 7 days   Lab Units 02/27/25  0538 02/26/25  0605 02/25/25  1809   SODIUM mmol/L 140 140 144   POTASSIUM mmol/L 4.1 3.8 3.6   CHLORIDE mmol/L 106 108* 106   CO2 mmol/L 25.0 22.4 26.4   BUN mg/dL 18 16 19   CREATININE mg/dL 0.70 0.66 1.00   GLUCOSE mg/dL 214* 248* 106*   Estimated Creatinine Clearance: 53.8 mL/min (by C-G formula based on SCr of 0.7 mg/dL).  Results from last 7 days   Lab Units 02/26/25  0605 02/25/25  1809   ALBUMIN g/dL 3.1* 3.6   BILIRUBIN mg/dL 0.5 0.4   ALK PHOS U/L 154* 179*   AST (SGOT) U/L 23 28   ALT (SGPT) U/L 15 15     Results from last 7 days   Lab Units 02/27/25  0538 02/26/25  0605 02/25/25  1809   CALCIUM mg/dL 8.8 8.3 8.9   ALBUMIN g/dL  --  3.1* 3.6       Results from last 7 days   Lab Units 02/25/25  1924 02/25/25  1809   HSTROP T ng/L 31* 30*   PROBNP pg/mL  --  601.0           Invalid input(s): \"LDLCALC\"        Test Results Pending at Discharge       Discharge Details        Discharge Medications        New Medications        Instructions Start Date   guaiFENesin 600 MG 12 hr tablet  Commonly known as: MUCINEX   1,200 mg, Oral, Every 12 Hours Scheduled      ipratropium-albuterol 0.5-2.5 mg/3 ml nebulizer  Commonly known as: DUO-NEB   3 mL, Nebulization, 4 Times Daily - RT      predniSONE 20 MG tablet  Commonly known as: DELTASONE   40 mg, Oral, Daily With Breakfast   Start Date: February 28, 2025   "   sodium chloride 7 % nebulizer solution nebulizer solution   4 mL, Nebulization, 3 Times Daily - RT             Continue These Medications        Instructions Start Date   acetaminophen 325 MG tablet  Commonly known as: TYLENOL   650 mg, Oral, Every 4 Hours PRN      albuterol (2.5 MG/3ML) 0.083% nebulizer solution  Commonly known as: PROVENTIL   2.5 mg, Every 4 Hours      budesonide 0.5 MG/2ML nebulizer solution  Commonly known as: PULMICORT   0.5 mg, 2 Times Daily      calcium 500 mg vitamin D 5 mcg (200 UT) 500-5 MG-MCG tablet per tablet   1 tablet, Oral, 2 Times Daily      Cetirizine HCl 10 MG capsule   1 capsule, Daily      [facility]  Notes to patient: Inject 1 dose under the skin into appropriate area every 8 weeks    1 dose, Every 2 Months      fluticasone 50 MCG/ACT nasal spray  Commonly known as: FLONASE   1 spray, Daily      furosemide 20 MG tablet  Commonly known as: LASIX   20 mg, Oral, As Needed      glipizide 5 MG tablet  Commonly known as: GLUCOTROL   5 mg, Daily With Breakfast      loperamide 2 MG capsule  Commonly known as: IMODIUM   2 mg, 4 Times Daily PRN      melatonin 5 MG tablet tablet   5 mg, Nightly      metFORMIN  MG 24 hr tablet  Commonly known as: GLUCOPHAGE-XR   500 mg, Daily With Breakfast      montelukast 10 MG tablet  Commonly known as: SINGULAIR   10 mg, Nightly      NAC PO   1,000 mg, 2 Times Daily      oxybutynin XL 10 MG 24 hr tablet  Commonly known as: DITROPAN-XL   10 mg, 2 Times Daily      rosuvastatin 20 MG tablet  Commonly known as: CRESTOR   20 mg, Nightly      warfarin 4 MG tablet  Commonly known as: COUMADIN   Take one tablet by mouth on mon, wed, fri and take one-half of a tablet by mouth all other days or as directed             Stop These Medications      methocarbamol 750 MG tablet  Commonly known as: ROBAXIN     metoprolol tartrate 25 MG tablet  Commonly known as: LOPRESSOR              Allergies   Allergen Reactions    Accupril [Quinapril Hcl] Swelling, Other  (See Comments), GI Intolerance and Delirium     HA, constipation     Ahist [Chlorpheniramine] Nausea Only, Other (See Comments) and Dizziness     Headache, Blurred vision    Clarithromycin Nausea Only, Other (See Comments) and Mental Status Change     HA, Depression, Flushing    Esomeprazole GI Intolerance    Latex Other (See Comments)     Skin breakdown    Levalbuterol Swelling    Levocetirizine Diarrhea and GI Intolerance    Lipitor [Atorvastatin] Other (See Comments) and Myalgia     Dark urine    Omeprazole Nausea Only and Other (See Comments)     HA    Pravachol [Pravastatin] Nausea Only and GI Intolerance     Bloated, Constipation, HA    Sulindac Other (See Comments) and Myalgia     HA, joint pain, bruising    Valdecoxib Irritability    Chlorcyclizine Unknown - Low Severity    Chlorpheniramine-Phenylephrine Unknown - High Severity    Diclofenac Sodium Unknown - Low Severity    Diphenhydramine Unknown - Low Severity    Lodine [Etodolac] Unknown - Low Severity    Sulfa Antibiotics Unknown - Low Severity         Discharge Disposition:  Home-Health Care [facility]    Discharge Diet:  Diet Order   Procedures    Diet: Diabetic, Regular/House, Cardiac; Healthy Heart (2-3 Na+); Consistent Carbohydrate; Fluid Consistency: Thin (IDDSI 0)       Discharge Activity:   Activity Instructions       Activity as Tolerated              CODE STATUS:    Code Status and Medical Interventions: CPR (Attempt to Resuscitate); Full Support   Ordered at: 02/25/25 2244     Code Status (Patient has no pulse and is not breathing):    CPR (Attempt to Resuscitate)     Medical Interventions (Patient has pulse or is breathing):    Full Support       Future Appointments   Date Time Provider Department Center   3/3/2025 10:00 AM Jie Garcia APRN [facility] CD [facility]   3/20/2025  9:10 AM LAB CHAIR 5 [facility] LUIS FERNANDO  LAB [facility] [place]   3/20/2025  9:40 AM Lucien Larkin MD [facility] CBC [facility] [place]   3/20/2025 10:00 AM CHAIR 2A [facility] ANA MARIA - SHAWN  INFUS [facility]RE [facility]    4/11/2025  1:50 PM Mehul Miller MD [facility] ID [facility]Y [facility]   8/11/2025 10:00 AM [facility] LCG [place] DEVICE CHECK [facility] CD [id number] None   8/11/2025 10:30 AM Jonah Regalado Jr., MD [facility] CD [facility]   3/26/2026 10:00 AM [facility] OP VAS RM 3 BH [facility] OVKR [facility]   3/26/2026 10:30 AM Patricia Steve, APRN [facility] VS [facility]Y [facility]     Additional Instructions for the Follow-ups that You Need to Schedule       Ambulatory Referral to Home Health (Hospital)   As directed      Face to Face Visit Date: 2/27/2025   Follow-up provider for Plan of Care?: I treated the patient in an acute care facility and will not continue treatment after discharge.   Follow-up provider: LEIGH WALKER [[id number]]   Reason/Clinical Findings: debility/hospitalization   Describe mobility limitations that make leaving home difficult: debility/hospitalization   Nursing/Therapeutic Services Requested: Skilled Nursing Physical Therapy   Skilled nursing orders: COPD management Cardiopulmonary assessments   PT orders: Transfer training Home safety assessment Therapeutic exercise Strengthening   Frequency: 1 Week 1        Call MD With Problems / Concerns   As directed      Instructions: return to the hospital if you experience chest pain, shortness of breath, abdominal pain, nausea, vomiting, fevers, sweats, chills, or worsening of your symptoms    Order Comments: Instructions: return to the hospital if you experience chest pain, shortness of breath, abdominal pain, nausea, vomiting, fevers, sweats, chills, or worsening of your symptoms         Discharge Follow-up with Specialty: pulmonology 1-2 weeks or as otherwise directed   As directed      Specialty: pulmonology 1-2 weeks or as otherwise directed               Follow-up Information       Leigh Walker MD .    Specialty: Internal Medicine  Contact information:  [address] [place] [id number] [phone]                             Additional Instructions for the Follow-ups that You  Need to Schedule       Ambulatory Referral to Home Health (Hospital)   As directed      Face to Face Visit Date: 2/27/2025   Follow-up provider for Plan of Care?: I treated the patient in an acute care facility and will not continue treatment after discharge.   Follow-up provider: LEIGH WALKER [[id number]]   Reason/Clinical Findings: debility/hospitalization   Describe mobility limitations that make leaving home difficult: debility/hospitalization   Nursing/Therapeutic Services Requested: Skilled Nursing Physical Therapy   Skilled nursing orders: COPD management Cardiopulmonary assessments   PT orders: Transfer training Home safety assessment Therapeutic exercise Strengthening   Frequency: 1 Week 1        Call MD With Problems / Concerns   As directed      Instructions: return to the hospital if you experience chest pain, shortness of breath, abdominal pain, nausea, vomiting, fevers, sweats, chills, or worsening of your symptoms    Order Comments: Instructions: return to the hospital if you experience chest pain, shortness of breath, abdominal pain, nausea, vomiting, fevers, sweats, chills, or worsening of your symptoms         Discharge Follow-up with Specialty: pulmonology 1-2 weeks or as otherwise directed   As directed      Specialty: pulmonology 1-2 weeks or as otherwise directed            Time Spent on Discharge:  Greater than 30 minutes      Amaury Messer MD  [facility]  02/27/25  10:07 EST

## 2025-02-27 NOTE — PROGRESS NOTES
"  PROGRESS NOTE  Patient Name: Caitlyn Longoria  Age/Sex: 89 y.o. female  : 1934  MRN: [id number]    Date of Admission: 2025  Date of Encounter Visit: 25   LOS: 0 days   Patient Care Team:  Zenaida Suarez MD as PCP - General (Internal Medicine)  Pao Levi CHILO as Pharmacist  Alexander Valiente PharmD as Pharmacist (Pharmacy)  Zenaida Suarez MD as Referring Physician (Internal Medicine)  Lucien Larkin MD as Consulting Physician (Hematology and Oncology)    Chief Complaint: COPD exacerbation    Hospital course: Patient is doing a lot better, she is expectorating better with the mucolytic therapy, she is afebrile, her dyspnea has nearly resolved and she is able to move in the room with much less limitations  She is being contemplated for home discharge today and that should be okay from the pulmonary standpoint         REVIEW OF SYSTEMS:   CONSTITUTIONAL: no fever or chills  CARDIOVASCULAR: No chest pain, chest pressure or chest discomfort. No palpitations or edema.   RESPIRATORY: Significant improvement in shortness of breath.   GASTROINTESTINAL: No anorexia, nausea, vomiting or diarrhea. No abdominal pain or blood.   HEMATOLOGIC: No bleeding or bruising.     Ventilator/Non-Invasive Ventilation Settings (From admission, onward)      None              Vital Signs  Temp:  [97.3 °F (36.3 °C)-98.4 °F (36.9 °C)] 97.4 °F (36.3 °C)  Heart Rate:  [80-83] 80  Resp:  [18] 18  BP: (123-140)/(70-79) 129/76  SpO2:  [91 %-100 %] 97 %  on  Flow (L/min) (Oxygen Therapy):  [1] 1 Device (Oxygen Therapy): room air    Intake/Output Summary (Last 24 hours) at 2025 1220  Last data filed at 2025 0900  Gross per 24 hour   Intake 480 ml   Output 900 ml   Net -420 ml     Flowsheet Rows      Flowsheet Row First Filed Value   Admission Height 160 cm (62.99\") Documented at 2025 1900   Admission Weight 79.7 kg (175 lb 11.3 oz) Documented at 2025 1900          Body mass index " "is 31.57 kg/m².      02/25/25  1900 02/25/25  2338 02/27/25  0549   Weight: 79.7 kg (175 lb 11.3 oz) 81.6 kg (180 lb) 80.8 kg (178 lb 3.2 oz)       Physical Exam:  GEN:  No acute distress, alert, cooperative, well developed   EYES:   Sclerae clear. No icterus. PERRL. Normal EOM  ENT:   External ears/nose normal, no oral lesions, no thrush, mucous membranes moist  NECK:  Supple, midline trachea, no JVD  LUNGS: Normal chest on inspection, positive for kyphosis  No more wheezes, slightly diminished breath sounds, no prolongation of the expiratory phase, resolved rhonchi.   CV:  Regular rhythm and rate. Normal S1/S2. No murmurs, gallops, or rubs noted.  ABD:  Soft, nontender and nondistended. Normal bowel sounds. No guarding  EXT:  Moves all extremities well. No cyanosis. No redness.  Edema.   Skin: Dry, intact, no bleeding    Results Review:        Results from last 7 days   Lab Units 02/27/25  0538 02/26/25  0605 02/25/25  1809   SODIUM mmol/L 140 140 144   POTASSIUM mmol/L 4.1 3.8 3.6   CHLORIDE mmol/L 106 108* 106   CO2 mmol/L 25.0 22.4 26.4   BUN mg/dL 18 16 19   CREATININE mg/dL 0.70 0.66 1.00   CALCIUM mg/dL 8.8 8.3 8.9   AST (SGOT) U/L  --  23 28   ALT (SGPT) U/L  --  15 15   ANION GAP mmol/L 9.0 9.6 11.6   ALBUMIN g/dL  --  3.1* 3.6     Results from last 7 days   Lab Units 02/25/25  1924 02/25/25  1809   HSTROP T ng/L 31* 30*         Results from last 7 days   Lab Units 02/25/25  1809   PROBNP pg/mL 601.0     Results from last 7 days   Lab Units 02/27/25  0538 02/26/25  0605 02/25/25  1855   WBC 10*3/mm3 12.32* 7.69 9.25   HEMOGLOBIN g/dL 11.2* 11.9* 12.7   HEMATOCRIT % 35.0 37.7 40.2   PLATELETS 10*3/mm3 129* 119* 135*   MCV fL 97.0 96.7 100.2*     Results from last 7 days   Lab Units 02/27/25  0538 02/25/25  1855 02/21/25  0000   INR  1.72* 1.60* 1.80               Invalid input(s): \"LDLCALC\"          Glucose   Date/Time Value Ref Range Status   02/27/2025 1119 242 (H) 70 - 130 mg/dL Final   02/27/2025 0602 " 228 (H) 70 - 130 mg/dL Final   02/26/2025 2102 207 (H) 70 - 130 mg/dL Final   02/26/2025 1638 258 (H) 70 - 130 mg/dL Final   02/26/2025 1043 329 (H) 70 - 130 mg/dL Final   02/26/2025 0559 252 (H) 70 - 130 mg/dL Final   02/26/2025 0017 294 (H) 70 - 130 mg/dL Final                 Results from last 7 days   Lab Units 02/25/25  1857   COVID19  Not Detected   ADENOVIRUS DETECTION BY PCR  Not Detected   CORONAVIRUS 229E  Not Detected   CORONAVIRUS HKU1  Detected*   CORONAVIRUS NL63  Not Detected   CORONAVIRUS OC43  Not Detected   HUMAN METAPNEUMOVIRUS  Not Detected   HUMAN RHINOVIRUS/ENTEROVIRUS  Not Detected   INFLUENZA B PCR  Not Detected   PARAINFLUENZA 1  Not Detected   PARAINFLUENZA VIRUS 2  Not Detected   PARAINFLUENZA VIRUS 3  Not Detected   PARAINFLUENZA VIRUS 4  Not Detected   BORDETELLA PERTUSSIS PCR  Not Detected   BORDETELLA PARAPERTUSSIS PCR  Not Detected   CHLAMYDOPHILA PNEUMONIAE PCR  Not Detected   MYCOPLAMA PNEUMO PCR  Not Detected   RSV, PCR  Not Detected               Imaging:   Imaging Results (All)       Procedure Component Value Units Date/Time    XR Chest 1 View [[id number]] Collected: 02/25/25 1912     Updated: 02/25/25 1917    Narrative:      CXR ONE VIEW      HISTORY: soa     COMPARISON: 9/27/2024     TECHNIQUE: single portable AP       Impression:         There is mild cardiomegaly, and there is elevation of the left  hemidiaphragm, both unchanged. Multilead left subclavian approach  transvenous pacemaker device remains in place.     There is a submaximal inspiratory result. Allowing for that, there is no  convincing evidence of acute infiltrate, effusion or pneumothorax.           This report was finalized on 2/25/2025 7:13 PM by Dr. Dominick Garcia M.D on Workstation: [id number]               I reviewed the patient's new clinical results.  I personally viewed and interpreted the patient's imaging results:        Medication Review:   budesonide, 0.5 mg, Nebulization, BID  calcium 500 mg  vitamin D 5 mcg (200 UT), 1 tablet, Oral, BID  fluticasone, 1 spray, Nasal, Daily  guaiFENesin, 1,200 mg, Oral, Q12H  insulin lispro, 2-7 Units, Subcutaneous, 4x Daily AC & at Bedtime  ipratropium-albuterol, 3 mL, Nebulization, 4x Daily - RT  melatonin, 5 mg, Oral, Nightly  Menthol-Zinc Oxide, 1 Application, Topical, Q12H  montelukast, 10 mg, Oral, Nightly  oxybutynin XL, 10 mg, Oral, Nightly  [START ON 2/28/2025] predniSONE, 40 mg, Oral, Daily With Breakfast  rosuvastatin, 20 mg, Oral, Nightly  sodium chloride, 4 mL, Nebulization, TID - RT  warfarin, 2 mg, Oral, Once per day on Sunday Tuesday Thursday Saturday  warfarin, 4 mg, Oral, Once per day on Monday Wednesday Friday        Pharmacy to dose warfarin,         ASSESSMENT:   Coronavirus HKU 1 infection with acute exacerbation of asthma/COPD  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  Paroxysmal atrial fibrillation  Chronic kidney disease stage III  CLL  Sick sinus syndrome  Thrombocytopenia  HFrEF    PLAN:  Patient is doing a lot better, she is on room air, she denies any dyspnea with the slightest activity  She is having good response to the mucolytic therapy which are to be continued after discharge  I did discuss with the patient how to taper those labs and she is pretty familiar with them from prior similar presentations  She is cleared for discharge home from my standpoint, she has follow-up with me as an outpatient and she was instructed to call for a sooner appointment if she starts feeling worse after discharge  Discussed with   Labs/Notes/films were independently reviewed and pertinent results are summarized above  The copied texts in this note were reviewed and they are accurate as of 02/27/25    Disposition: Home today    Juno oCburn MD  02/27/25  12:20 EST           Dictated utilizing Dragon dictation

## 2025-02-27 NOTE — PLAN OF CARE
Goal Outcome Evaluation:      AVS completed for discharge.                                        [Well Groomed] : well groomed [Normal Appearance] : normal appearance [General Appearance - Well Developed] : well developed [General Appearance - Well Nourished] : well nourished [No Deformities] : no deformities [Normal Conjunctiva] : the conjunctiva exhibited no abnormalities [General Appearance - In No Acute Distress] : no acute distress [Eyelids - No Xanthelasma] : the eyelids demonstrated no xanthelasmas [Neck Cervical Mass (___cm)] : no neck mass was observed [Jugular Venous Distention Increased] : there was no jugular-venous distention [Neck Appearance] : the appearance of the neck was normal [Normal Oropharynx] : normal oropharynx [Thyroid Diffuse Enlargement] : the thyroid was not enlarged [Thyroid Nodule] : there were no palpable thyroid nodules [Heart Rate And Rhythm] : heart rate and rhythm were normal [Heart Sounds] : normal S1 and S2 [Murmurs] : no murmurs present [Respiration, Rhythm And Depth] : normal respiratory rhythm and effort [Exaggerated Use Of Accessory Muscles For Inspiration] : no accessory muscle use [Abdomen Tenderness] : non-tender [Auscultation Breath Sounds / Voice Sounds] : lungs were clear to auscultation bilaterally [Abdomen Soft] : soft [Abdomen Mass (___ Cm)] : no abdominal mass palpated [Nail Clubbing] : no clubbing of the fingernails [Petechial Hemorrhages (___cm)] : no petechial hemorrhages [Cyanosis, Localized] : no localized cyanosis [Skin Turgor] : normal skin turgor [No Focal Deficits] : no focal deficits [] : no rash [Skin Color & Pigmentation] : normal skin color and pigmentation [Oriented To Time, Place, And Person] : oriented to person, place, and time [Impaired Insight] : insight and judgment were intact [Affect] : the affect was normal

## 2025-02-28 ENCOUNTER — ANTICOAGULATION VISIT (OUTPATIENT)
Dept: PHARMACY | Facility: HOSPITAL | Age: OVER 89
End: 2025-02-28
Payer: COMMERCIAL

## 2025-02-28 DIAGNOSIS — I48.0 PAROXYSMAL ATRIAL FIBRILLATION: Primary | ICD-10-CM

## 2025-02-28 NOTE — PROGRESS NOTES
Anticoagulation Clinic Progress Note    Anticoagulation Summary  As of 2025      INR goal:  2.0-3.0   TTR:  64.9% (6.1 y)   INR used for dosin.72 (2025)   Warfarin maintenance plan:  4 mg every Mon, Wed, Fri; 2 mg all other days   Weekly warfarin total:  20 mg   No change documented:  Alexander Valiente PharmD   Plan last modified:  Alexander Valiente PharmD (2025)   Next INR check:  3/7/2025   Priority:  Maintenance   Target end date:  Indefinite    Indications    Paroxysmal atrial fibrillation [I48.0]                 Anticoagulation Episode Summary       INR check location:  --    Preferred lab:  --    Send INR reminders to:   JACEY SINCLAIR  POOL    Comments:  [facility] beginning 20          Anticoagulation Care Providers       Provider Role Specialty Phone number    Jonah Regalado Jr., MD Referring Cardiology [phone]            Clinic Interview:  Patient Findings     Positives:  Change in health, Missed doses, Change in medications,   Hospital admission    Negatives:  Signs/symptoms of thrombosis, Signs/symptoms of bleeding,   Laboratory test error suspected, Change in alcohol use, Change in   activity, Upcoming invasive procedure, Emergency department visit,   Upcoming dental procedure, Extra doses, Change in diet/appetite, Bruising,   Other complaints    Comments:  Hospital admission 25 - 25 for a coronavirus   infection and COPD. Warfarin was not administered on 25, and she had   not been taken prior to presenting at the hospital. She was discharged   home yesterday on prednisone 40 mg daily x 4 days.       Clinical Outcomes     Negatives:  Major bleeding event, Thromboembolic event,   Anticoagulation-related hospital admission, Anticoagulation-related ED   visit, Anticoagulation-related fatality    Comments:  Hospital admission 25 - 25 for a coronavirus   infection and COPD. Warfarin was not administered on 25, and she had   not been taken  prior to presenting at the hospital. She was discharged   home yesterday on prednisone 40 mg daily x 4 days.         INR History:      Latest Ref Rng & Units 2/12/2025    12:00 AM 2/12/2025     3:54 PM 2/21/2025    12:00 AM 2/21/2025     9:22 AM 2/25/2025     6:55 PM 2/27/2025     5:38 AM 2/28/2025     3:44 PM   Anticoagulation Monitoring   INR   1.90  1.80   1.72   INR Date   2/12/2025 2/21/2025 2/27/2025   INR Goal   2.0-3.0  2.0-3.0   2.0-3.0   Trend   Up  Up   Same   Last Week Total   16 mg  18 mg   18 mg   Next Week Total   18 mg  20 mg   20 mg   Sun   2 mg  2 mg   2 mg   Mon   2 mg  4 mg   4 mg   Tue   2 mg  2 mg   2 mg   Wed   4 mg  4 mg   4 mg   Thu   2 mg  2 mg   2 mg   Fri   2 mg  4 mg   4 mg   Sat   4 mg  2 mg   2 mg   Historical INR 0.90 - 1.10 1.90      1.80      1.60  1.72         This result is from an external source.       Plan:  1. INR was Subtherapeutic yesterday prior to her discharge from hospital - see above in Anticoagulation Summary. Prednisone is likely to raise her INR.   Will instruct Caitlyn GARRETT Longoria to Continue their warfarin regimen (due for higher 4-mg dose today) - see above in Anticoagulation Summary.  2. Follow up in 1 week.  3. They have been instructed to call if any changes in medications, doses, concerns, etc. Patient expresses understanding and has no further questions at this time.    Alexander Valiente, PharmD

## 2025-02-28 NOTE — SIGNIFICANT NOTE
Case Management Discharge Note      Final Note: (P) Home         Selected Continued Care - Discharged on 2/27/2025 Admission date: 2/25/2025 - Discharge disposition: Home-Health Care Svc      Destination    No services have been selected for the patient.                Durable Medical Equipment    No services have been selected for the patient.                Dialysis/Infusion    No services have been selected for the patient.                Home Medical Care    No services have been selected for the patient.                Therapy    No services have been selected for the patient.                Community Resources    No services have been selected for the patient.                Community & DME    No services have been selected for the patient.                         Final Discharge Disposition Code: (P) 01 - home or self-care

## 2025-02-28 NOTE — OUTREACH NOTE
Prep Survey      Flowsheet Row Responses   [facility] facility patient discharged from? [facility]   Is LACE score < 7 ? No   Eligibility Readm Mgmt   Discharge diagnosis COPD with acute exacerbation   Does the patient have one of the following disease processes/diagnoses(primary or secondary)? COPD   Does the patient have Home health ordered? No   Is there a DME ordered? No   Prep survey completed? Yes            DAVID SOMERS - Registered Nurse

## 2025-03-03 ENCOUNTER — OFFICE VISIT (OUTPATIENT)
Dept: CARDIOLOGY | Facility: CLINIC | Age: OVER 89
End: 2025-03-03
Payer: MEDICARE

## 2025-03-03 ENCOUNTER — HOSPITAL ENCOUNTER (OUTPATIENT)
Dept: CARDIOLOGY | Facility: HOSPITAL | Age: OVER 89
Discharge: HOME | End: 2025-03-03
Admitting: NURSE PRACTITIONER
Payer: MEDICARE

## 2025-03-03 VITALS
OXYGEN SATURATION: 94 % | HEART RATE: 88 BPM | BODY MASS INDEX: 31.54 KG/M2 | DIASTOLIC BLOOD PRESSURE: 60 MMHG | HEIGHT: 63 IN | WEIGHT: 178 LBS | SYSTOLIC BLOOD PRESSURE: 100 MMHG

## 2025-03-03 DIAGNOSIS — R06.02 SHORTNESS OF BREATH: ICD-10-CM

## 2025-03-03 DIAGNOSIS — R06.02 SHORTNESS OF BREATH: Primary | ICD-10-CM

## 2025-03-03 LAB — NT-PROBNP SERPL-MCNC: 577 PG/ML (ref 0–1800)

## 2025-03-03 PROCEDURE — 83880 ASSAY OF NATRIURETIC PEPTIDE: CPT | Performed by: NURSE PRACTITIONER

## 2025-03-03 PROCEDURE — 36415 COLL VENOUS BLD VENIPUNCTURE: CPT

## 2025-03-03 PROCEDURE — 1160F RVW MEDS BY RX/DR IN RCRD: CPT | Performed by: NURSE PRACTITIONER

## 2025-03-03 PROCEDURE — 1159F MED LIST DOCD IN RCRD: CPT | Performed by: NURSE PRACTITIONER

## 2025-03-03 PROCEDURE — 99214 OFFICE O/P EST MOD 30 MIN: CPT | Performed by: NURSE PRACTITIONER

## 2025-03-03 NOTE — PROGRESS NOTES
Date of Office Visit: 2025  Encounter Provider: KRISTIN Squires  Place of Service: [facility] CARDIOLOGY  Patient Name: Caitlyn Longoria  :1934    Chief complaint: Coronary artery disease    HPI: Caitlyn Longoria is a 89 y.o. female who is a patient of Dr. Regalado and is new to me today.  She has a past medical history of atrial fibrillation's, sick sinus syndrome, coronary disease and heart failure, stroke, ischemic cardiomyopathy, chronic kidney disease, hyperlipidemia and type 2 diabetes.    She had an MI in  underwent a cardiac cath was found to have a total occlusion of the mid LAD, severe disease of the large diagonal and significant disease of the right coronary artery and circumflex.  She had angioplasty and drug-eluting stent to the diagonal.  Her EF at that time was 35%.  She developed a right pseudoaneurysm.  It had to be surgically repaired.  Echocardiogram later that year showed her EF had recovered.  In  she was found to have sick sinus syndrome after severe bradycardia and symptomatic.  She underwent a permanent pacemaker.  In  she had a cardiac cath for recurrent angina she was found to have in-stent restenosis of the diagonal with a  of the mid LAD.She also had PCI to the diagonal vessel. A right heart cath was performed as well that showed PA systolic pressure of 53 mmHg. She presented with heart failure in  when echocardiogram showed EF of 23% with grade 1 diastolic dysfunction calcific valvular disease with no significant stenosis or regurgitation. After being placed back on GDMT improved EF found on echocardiogram in  and 35 to 40%. Since then she has developed worsening reactive airway disease and is currently on 2 to 3 L of home oxygen.     She was last in the office in August and was doing well.  She got admitted to the hospital in 2020 for mild elevation in troponin likely related to upper respiratory  infection.  She was in the office about a week ago she had a COPD exacerbation.  We did not follow her during that admission she was found to have coronavirus H KU 1.  She is here for follow-up    She comes in today because she was afraid that her pacemaker had slipped under her left axilla.  She has some pain and numbness there the first 2 days prior to going to the emergency room for COPD exacerbation.  X-ray shows that the wires are in the appropriate location but does appear that maybe her device has slipped down slightly but device check on the 21st was normal.  And the device is under the incision on her chest wall.  She is short of breath she is congested she said she feels a little worse since leaving the hospital.  Previous testing and notes have been reviewed by me.      Latest Reference Range & Units 02/27/25 05:38   Sodium 136 - 145 mmol/L 140   Potassium 3.5 - 5.2 mmol/L 4.1   Chloride 98 - 107 mmol/L 106   CO2 22.0 - 29.0 mmol/L 25.0   Anion Gap 5.0 - 15.0 mmol/L 9.0   BUN 8 - 23 mg/dL 18   Creatinine 0.57 - 1.00 mg/dL 0.70   BUN/Creatinine Ratio 7.0 - 25.0  25.7 (H)   eGFR >60.0 mL/min/1.73 82.3   Glucose 65 - 99 mg/dL 214 (H)   Calcium 8.2 - 9.6 mg/dL 8.8   Protime 11.7 - 14.2 Seconds 20.2 (H)   INR 0.90 - 1.10  1.72 (H)   WBC 3.40 - 10.80 10*3/mm3 12.32 (H)   RBC 3.77 - 5.28 10*6/mm3 3.61 (L)   Hemoglobin 12.0 - 15.9 g/dL 11.2 (L)   Hematocrit 34.0 - 46.6 % 35.0   Platelets 140 - 450 10*3/mm3 129 (L)   RDW 12.3 - 15.4 % 13.1   MCV 79.0 - 97.0 fL 97.0   MCH 26.6 - 33.0 pg 31.0   MCHC 31.5 - 35.7 g/dL 32.0   MPV 6.0 - 12.0 fL 10.6   RDW-SD 37.0 - 54.0 fl 46.8   (H): Data is abnormally high  (L): Data is abnormally low       Contains abnormal data Lipid Panel (REVIEW PROCESS INSTRUCTIONS AT ORDERING AND COLLECTING)  Specimen: Blood - Venous blood specimen (specimen)  Component  Ref Range & Units 7 d ago Comments   Triglycerides  0 - 149 mg/dL 102 Triglyceride levels (in mg/dL)  Normal             0-9yrs: <75                  10-19yrs: <90                    >19yrs: <150  Borderline        0-9yrs: 75-99                  10-19yrs:                     >19yrs: 150-199  High/Very High    0-9yrs: >99                  10-19yrs: >129                    >19yrs: >200   Cholesterol  0 - 199 mg/dL 105 Cholesterol levels (in mg/dL)  Desirable:        <200 adults/<170 children  Borderline-high:   200-239 adults/170-199 children  High:             >239 adults/>199 children.   HDL Cholesterol  >=60 mg/dL 34 Low     LDL Cholesterol, Calculated  0 - 100 mg/dL 50 Calculated using Merchant/NIH equation.      LDL levels (in mg/dL)  Optimal:            <100  Near/Above Optimal:  100-129  Borderline High:     130-159  High:                160-189  Very High:          >189   VLDL  0 - 30 mg/dL 20    CHOL/HDL Ratio  Ratio 3.1      Past Medical History:   Diagnosis Date    Aortic calcification     mild, on echo 12/17/2017    Aortic regurgitation     Trace    Asthma     Atrial fibrillation     CAD (coronary artery disease)     Carpal tunnel syndrome of left wrist     Chronic combined systolic and diastolic congestive heart failure     CKD (chronic kidney disease) stage 3, GFR 30-59 ml/min     COPD (chronic obstructive pulmonary disease)     Coronary artery disease involving native coronary artery of native heart with angina pectoris     Disc degeneration, lumbar     Diverticulosis     DM type 2 (diabetes mellitus, type 2)     GERD (gastroesophageal reflux disease)     History of aneurysm     right femoral artery s/p LHC    History of blood transfusion     History of fracture     History of heart attack     History of vitamin D deficiency     Hyperlipidemia     Hypertension     Leukemia     Mild mitral regurgitation     Mitral annular calcification     12/8/2017- echo, moderate    Osteopenia     PAF (paroxysmal atrial fibrillation)     Peripheral neuropathy     Skin cancer     Left hand    Sleep apnea     bipap    SSS (sick sinus  syndrome)     Stroke (cerebrum)     TIA (transient ischemic attack) 2017    Tricuspid regurgitation     Trace       Past Surgical History:   Procedure Laterality Date    BRONCHOSCOPY Bilateral 10/6/2020    Procedure: BRONCHOSCOPY with BILATERAL LUNG washings;  Surgeon: Juno Coburn MD;  [facility];  Service: Pulmonary;  Laterality: Bilateral;  PRE: purulent bronchitis  POST: PURULENT BRONCHITIS    BRONCHOSCOPY Bilateral 10/9/2020    Procedure: BRONCHOSCOPY with washing;  Surgeon: Juno Coburn MD;  [facility];  Service: Pulmonary;  Laterality: Bilateral;  pre/post - mucous plug      CARDIAC CATHETERIZATION      CARDIAC ELECTROPHYSIOLOGY PROCEDURE N/A 2/7/2020    Procedure: PPM generator change - dual  medtronic;  Surgeon: Kiel Field MD;  Location: [facility] CATH INVASIVE LOCATION;  Service: Cardiology;  Laterality: N/A;    CHOLECYSTECTOMY      CORONARY STENT PLACEMENT      ENDOSCOPY N/A 9/14/2022    Procedure: ESOPHAGOGASTRODUODENOSCOPY with 54fr main dilatation;  Surgeon: Enio Cota MD;  Location: [facility] ENDOSCOPY;  Service: Gastroenterology;  Laterality: N/A;  pre - dysphagia  post - s/p dilatation, watermelon stomach    HERNIA REPAIR      hital hernia    HYSTERECTOMY      PACEMAKER IMPLANTATION      REPLACEMENT TOTAL KNEE Bilateral        Social History     Socioeconomic History    Marital status: Single   Tobacco Use    Smoking status: Never     Passive exposure: Never    Smokeless tobacco: Never    Tobacco comments:     caffeine use- soda   Vaping Use    Vaping status: Never Used   Substance and Sexual Activity    Alcohol use: Never    Drug use: No    Sexual activity: Defer       Family History   Problem Relation Age of Onset    Heart disease Mother     Hypertension Mother     Colon polyps Mother     Heart disease Father     Hypertension Father     Stroke Father     Hypertension Brother     Heart disease Brother     Diabetes Niece     Colon cancer Sister      Hypertension Sister     Hypertension Daughter     Hypertension Son     Hypertension Maternal Aunt     Hypertension Maternal Grandmother     Hypertension Maternal Grandfather     Hypertension Paternal Grandmother     Hypertension Paternal Grandfather        Review of Systems   Constitutional: Negative for diaphoresis and malaise/fatigue.   Cardiovascular:  Positive for dyspnea on exertion. Negative for chest pain, claudication, irregular heartbeat, leg swelling, near-syncope, orthopnea, palpitations, paroxysmal nocturnal dyspnea and syncope.   Respiratory:  Positive for cough. Negative for shortness of breath and sleep disturbances due to breathing.    Musculoskeletal:  Negative for falls.   Neurological:  Negative for dizziness and weakness.   Psychiatric/Behavioral:  Negative for altered mental status and substance abuse.        Allergies   Allergen Reactions    Accupril [Quinapril Hcl] Swelling, Other (See Comments), GI Intolerance and Delirium     HA, constipation     Ahist [Chlorpheniramine] Nausea Only, Other (See Comments) and Dizziness     Headache, Blurred vision    Clarithromycin Nausea Only, Other (See Comments) and Mental Status Change     HA, Depression, Flushing    Esomeprazole GI Intolerance    Latex Other (See Comments)     Skin breakdown    Levalbuterol Swelling    Levocetirizine Diarrhea and GI Intolerance    Lipitor [Atorvastatin] Other (See Comments) and Myalgia     Dark urine    Omeprazole Nausea Only and Other (See Comments)     HA    Pravachol [Pravastatin] Nausea Only and GI Intolerance     Bloated, Constipation, HA    Sulindac Other (See Comments) and Myalgia     HA, joint pain, bruising    Valdecoxib Irritability    Chlorcyclizine Unknown - Low Severity    Chlorpheniramine-Phenylephrine Unknown - High Severity    Diclofenac Sodium Unknown - Low Severity    Diphenhydramine Unknown - Low Severity    Lodine [Etodolac] Unknown - Low Severity    Sulfa Antibiotics Unknown - Low Severity          Current Outpatient Medications:     acetaminophen (TYLENOL) 325 MG tablet, Take 2 tablets by mouth Every 4 (Four) Hours As Needed for Mild Pain ., Disp:  , Rfl:     Acetylcysteine (NAC PO), Take 1,000 mg by mouth 2 (Two) Times a Day., Disp: , Rfl:     albuterol (PROVENTIL) (2.5 MG/3ML) 0.083% nebulizer solution, Take 2.5 mg by nebulization Every 4 (Four) Hours., Disp: , Rfl:     Benralizumab (FASENRA SC), Inject 1 dose under the skin into the appropriate area as directed Every 2 (Two) Months. Every 8 weeks, Disp: , Rfl:     budesonide (PULMICORT) 0.5 MG/2ML nebulizer solution, Take 2 mL by nebulization 2 (Two) Times a Day., Disp: , Rfl:     Calcium Carbonate-Vitamin D (calcium 500 mg vitamin D 5 mcg, 200 UT,) 500-5 MG-MCG tablet per tablet, Take 1 tablet by mouth 2 (Two) Times a Day., Disp: 60 tablet, Rfl: 3    Cetirizine HCl 10 MG capsule, Take 1 capsule by mouth Daily., Disp: , Rfl:     fluticasone (FLONASE) 50 MCG/ACT nasal spray, Administer 1 spray into the nostril(s) as directed by provider Daily., Disp: , Rfl:     furosemide (LASIX) 20 MG tablet, Take 1 tablet by mouth As Needed (For weight gain of greater than 2 pounds in 1 day or 5 pounds in 1 week). (Patient taking differently: Take 1 tablet by mouth Daily.), Disp: 30 tablet, Rfl: 11    glipizide (GLUCOTROL) 5 MG tablet, Take 1 tablet by mouth Daily With Breakfast., Disp: , Rfl:     guaiFENesin (MUCINEX) 600 MG 12 hr tablet, Take 2 tablets by mouth Every 12 (Twelve) Hours for 7 days., Disp: 28 tablet, Rfl: 0    ipratropium-albuterol (DUO-NEB) 0.5-2.5 mg/3 ml nebulizer, Take 3 mL by nebulization 4 (Four) Times a Day., Disp: 360 mL, Rfl: 0    loperamide (IMODIUM) 2 MG capsule, Take 1 capsule by mouth 4 (Four) Times a Day As Needed for Diarrhea., Disp: , Rfl:     melatonin 5 MG tablet tablet, Take 1 tablet by mouth Every Night., Disp: , Rfl:     metFORMIN ER (GLUCOPHAGE-XR) 500 MG 24 hr tablet, Take 1 tablet by mouth Daily With Breakfast., Disp: , Rfl:  "    montelukast (SINGULAIR) 10 MG tablet, Take 1 tablet by mouth Every Night., Disp: , Rfl:     oxybutynin XL (DITROPAN-XL) 10 MG 24 hr tablet, Take 1 tablet by mouth 2 (Two) Times a Day., Disp: , Rfl:     predniSONE (DELTASONE) 20 MG tablet, Take 2 tablets by mouth Daily With Breakfast for 4 doses., Disp: 8 tablet, Rfl: 0    rosuvastatin (CRESTOR) 20 MG tablet, Take 1 tablet by mouth Every Night., Disp: , Rfl:     sodium chloride 7 % nebulizer solution nebulizer solution, Take 4 mL by nebulization 3 (Three) Times a Day for 7 days., Disp: 240 mL, Rfl: 0    warfarin (COUMADIN) 4 MG tablet, Take one tablet by mouth on mon, wed, fri and take one-half of a tablet by mouth all other days or as directed, Disp: 65 tablet, Rfl: 1        Objective:     Vitals:    03/03/25 1010   BP: 100/60   BP Location: Right arm   Patient Position: Sitting   Pulse: 88   SpO2: 94%   Weight: 80.7 kg (178 lb)   Height: 160 cm (62.99\")     Body mass index is 31.54 kg/m².    PHYSICAL EXAM:    Constitutional:       General: Not in acute distress.     Appearance: Normal appearance. Well-developed.   Eyes:      Pupils: Pupils are equal, round, and reactive to light.   HENT:      Head: Normocephalic.   Neck:      Vascular: No carotid bruit or JVD.   Pulmonary:      Effort: Pulmonary effort is normal. No tachypnea.      Breath sounds: Normal breath sounds. Examination of the right-middle field reveals wheezing and rhonchi. Examination of the left-middle field reveals wheezing and rhonchi. Examination of the right-lower field reveals wheezing and rhonchi. Examination of the left-lower field reveals wheezing and rhonchi. No wheezing. No rales.   Cardiovascular:      Normal rate. Regular rhythm.      No gallop.    Pulses:     Intact distal pulses.   Edema:     Peripheral edema absent.   Abdominal:      General: Bowel sounds are normal.      Palpations: Abdomen is soft.      Tenderness: There is no abdominal tenderness.   Musculoskeletal: Normal range " of motion.      Cervical back: Normal range of motion and neck supple. No edema. Skin:     General: Skin is warm and dry.   Neurological:      Mental Status: Alert and oriented to person, place, and time.                 Assessment/Plan:      1.  Coronary artery disease-no angina symptoms-.  Continue statin therapy.  Rosuvastatin 20 mg daily.  No beta-blocker due to severe COPD    2.  Recent corona H KU 1 virus-still with cough and congestion continue Mucinex and nebulizers as well as prednisone    3.  Paroxysmal atrial fibrillation 51 episodes noted on last device interrogation.  Regular rhythm today paced.  On warfarin for anticoagulation    4.  COPD-follows outpatient with pulmonary    5.  Dyslipidemia goal LDL less than 70    6.  Permanent pacemaker-patient's device appears to have stable positioning on chest x-ray.  Device interrogation without any abnormalities.    Sounds congested on exam today we will get a BNP if elevated will have her take an extra Lasix for few days.  Follow-up in 1 month.     Your medication list            Accurate as of March 3, 2025 10:34 AM. If you have any questions, ask your nurse or doctor.                CHANGE how you take these medications        Instructions Last Dose Given Next Dose Due   furosemide 20 MG tablet  Commonly known as: LASIX  What changed: when to take this      Take 1 tablet by mouth As Needed (For weight gain of greater than 2 pounds in 1 day or 5 pounds in 1 week).              CONTINUE taking these medications        Instructions Last Dose Given Next Dose Due   acetaminophen 325 MG tablet  Commonly known as: TYLENOL      Take 2 tablets by mouth Every 4 (Four) Hours As Needed for Mild Pain .       albuterol (2.5 MG/3ML) 0.083% nebulizer solution  Commonly known as: PROVENTIL      Take 2.5 mg by nebulization Every 4 (Four) Hours.       budesonide 0.5 MG/2ML nebulizer solution  Commonly known as: PULMICORT      Take 2 mL by nebulization 2 (Two) Times a Day.        calcium 500 mg vitamin D 5 mcg (200 UT) 500-5 MG-MCG tablet per tablet      Take 1 tablet by mouth 2 (Two) Times a Day.       Cetirizine HCl 10 MG capsule      Take 1 capsule by mouth Daily.       FASENRA SC      Inject 1 dose under the skin into the appropriate area as directed Every 2 (Two) Months. Every 8 weeks       fluticasone 50 MCG/ACT nasal spray  Commonly known as: FLONASE      Administer 1 spray into the nostril(s) as directed by provider Daily.       glipizide 5 MG tablet  Commonly known as: GLUCOTROL      Take 1 tablet by mouth Daily With Breakfast.       ipratropium-albuterol 0.5-2.5 mg/3 ml nebulizer  Commonly known as: DUO-NEB      Take 3 mL by nebulization 4 (Four) Times a Day.       loperamide 2 MG capsule  Commonly known as: IMODIUM      Take 1 capsule by mouth 4 (Four) Times a Day As Needed for Diarrhea.       melatonin 5 MG tablet tablet      Take 1 tablet by mouth Every Night.       metFORMIN  MG 24 hr tablet  Commonly known as: GLUCOPHAGE-XR      Take 1 tablet by mouth Daily With Breakfast.       montelukast 10 MG tablet  Commonly known as: SINGULAIR      Take 1 tablet by mouth Every Night.       Mucus Relief 600 MG 12 hr tablet  Generic drug: guaiFENesin      Take 2 tablets by mouth Every 12 (Twelve) Hours for 7 days.       NAC PO      Take 1,000 mg by mouth 2 (Two) Times a Day.       oxybutynin XL 10 MG 24 hr tablet  Commonly known as: DITROPAN-XL      Take 1 tablet by mouth 2 (Two) Times a Day.       predniSONE 20 MG tablet  Commonly known as: DELTASONE      Take 2 tablets by mouth Daily With Breakfast for 4 doses.       rosuvastatin 20 MG tablet  Commonly known as: CRESTOR      Take 1 tablet by mouth Every Night.       sodium chloride 7 % nebulizer solution nebulizer solution      Take 4 mL by nebulization 3 (Three) Times a Day for 7 days.       warfarin 4 MG tablet  Commonly known as: COUMADIN      Take one tablet by mouth on mon, wed, fri and take one-half of a tablet by mouth all  other days or as directed                  As always, it has been a pleasure to participate in your patient's care.      Sincerely,     Jie FOSTER

## 2025-03-04 ENCOUNTER — TELEPHONE (OUTPATIENT)
Dept: CARDIOLOGY | Facility: CLINIC | Age: OVER 89
End: 2025-03-04
Payer: COMMERCIAL

## 2025-03-04 ENCOUNTER — READMISSION MANAGEMENT (OUTPATIENT)
Dept: CALL CENTER | Facility: HOSPITAL | Age: OVER 89
End: 2025-03-04
Payer: COMMERCIAL

## 2025-03-04 NOTE — TELEPHONE ENCOUNTER
----- Message from Jie Garcia sent at 3/3/2025  4:53 PM EST -----  Please let patient know that her lab test does not indicate any fluid retention.  Will keep her Lasix dose the same for now.  ----- Message -----  From: Lab, Background User  Sent: 3/3/2025  11:59 AM EST  To: Jie Garcia, APRN

## 2025-03-04 NOTE — TELEPHONE ENCOUNTER
Called and left VM, will continue to try to reach pt.    HUB- please put patient straight through to triage    Tonja Rodriguez, RN  Triage RN  03/04/25 09:46 EST

## 2025-03-04 NOTE — TELEPHONE ENCOUNTER
Notified patient of results and recommendations; patient verbalizes understanding.    Angelica [facility] Triage  03/04/25 11:05 EST

## 2025-03-04 NOTE — OUTREACH NOTE
COPD/PN Week 1 Survey      Flowsheet Row Responses   [facility], [place], operated by [facility] facility patient discharged from? [place]   Does the patient have one of the following disease processes/diagnoses(primary or secondary)? COPD   Week 1 attempt successful? No   Unsuccessful attempts Attempt 1            JAY JAY LOAIZA - Registered Nurse

## 2025-03-07 ENCOUNTER — ANTICOAGULATION VISIT (OUTPATIENT)
Dept: PHARMACY | Facility: HOSPITAL | Age: OVER 89
End: 2025-03-07
Payer: COMMERCIAL

## 2025-03-07 DIAGNOSIS — I48.0 PAROXYSMAL ATRIAL FIBRILLATION: Primary | ICD-10-CM

## 2025-03-07 LAB — INR PPP: 2.3

## 2025-03-07 NOTE — PROGRESS NOTES
Anticoagulation Clinic Progress Note    Anticoagulation Summary  As of 3/7/2025      INR goal:  2.0-3.0   TTR:  64.8% (6.2 y)   INR used for dosin.30 (3/7/2025)   Warfarin maintenance plan:  4 mg every Mon, Wed, Fri; 2 mg all other days   Weekly warfarin total:  20 mg   No change documented:  Alexander Valiente PharmD   Plan last modified:  Alexander Valiente PharmD (2025)   Next INR check:  3/19/2025   Priority:  Maintenance   Target end date:  Indefinite    Indications    Paroxysmal atrial fibrillation [I48.0]                 Anticoagulation Episode Summary       INR check location:  --    Preferred lab:  --    Send INR reminders to:   [facility]  POOL    Comments:  [facility] beginning 20          Anticoagulation Care Providers       Provider Role Specialty Phone number    Jonah Regalado Jr., MD Referring Cardiology [phone]            Clinic Interview:  Patient Findings     Positives:  Change in health, Change in medications, Change in   diet/appetite    Negatives:  Signs/symptoms of thrombosis, Signs/symptoms of bleeding,   Laboratory test error suspected, Change in alcohol use, Change in   activity, Upcoming invasive procedure, Emergency department visit,   Upcoming dental procedure, Missed doses, Extra doses, Hospital admission,   Bruising, Other complaints    Comments:  Reports she has completed her prednisone course following her   recent COVID infection. She reports her appetite has been low, but she has   been making herself eat.       Clinical Outcomes     Negatives:  Major bleeding event, Thromboembolic event,   Anticoagulation-related hospital admission, Anticoagulation-related ED   visit, Anticoagulation-related fatality    Comments:  Reports she has completed her prednisone course following her   recent COVID infection. She reports her appetite has been low, but she has   been making herself eat.         INR History:      2025    12:00 AM 2025     9:22 AM  2/25/2025     6:55 PM 2/27/2025     5:38 AM 2/28/2025     3:44 PM 3/7/2025    12:00 AM 3/7/2025    10:27 AM   Anticoagulation Monitoring   INR  1.80   1.72  2.30   INR Date  2/21/2025   2/27/2025  3/7/2025   INR Goal  2.0-3.0   2.0-3.0  2.0-3.0   Trend  Up   Same  Same   Last Week Total  18 mg   18 mg  20 mg   Next Week Total  20 mg   20 mg  20 mg   Sun  2 mg   2 mg  2 mg   Mon  4 mg   4 mg  4 mg   Tue  2 mg   2 mg  2 mg   Wed  4 mg   4 mg  4 mg   Thu  2 mg   2 mg  2 mg   Fri  4 mg   4 mg  4 mg   Sat  2 mg   2 mg  2 mg   Historical INR 1.80      1.60  1.72   2.30            This result is from an external source.       Plan:  1. INR is Therapeutic today- see above in Anticoagulation Summary.   Will instruct Caitlyn Longroia to Continue their warfarin regimen- see above in Anticoagulation Summary.  2. Follow up in 1.5 weeks.  3. They have been instructed to call if any changes in medications, doses, concerns, etc. Patient expresses understanding and has no further questions at this time.    Alexander Valiente, PharmD

## 2025-03-13 ENCOUNTER — OFFICE VISIT (OUTPATIENT)
Dept: WOUND CARE | Facility: HOSPITAL | Age: OVER 89
End: 2025-03-13
Payer: MEDICARE

## 2025-03-13 PROCEDURE — G0463 HOSPITAL OUTPT CLINIC VISIT: HCPCS

## 2025-03-18 ENCOUNTER — READMISSION MANAGEMENT (OUTPATIENT)
Dept: CALL CENTER | Facility: HOSPITAL | Age: OVER 89
End: 2025-03-18
Payer: COMMERCIAL

## 2025-03-18 NOTE — OUTREACH NOTE
COPD/PN Week 3 Survey      Flowsheet Row Responses   [facility] patient discharged from? [place]   Does the patient have one of the following disease processes/diagnoses(primary or secondary)? COPD   Week 3 attempt successful? Yes   Call start time 1527   Call end time 1532   General alerts for this patient [facility]   Discharge diagnosis COPD with acute exacerbation   Is patient permission given to speak with other caregiver? Yes   List who call center can speak with Zoey   Person spoke with today (if not patient) and relationship Zoey   What is the Home health agency?  HH is seeing pt per Zoey   DME comments Pt has home O2 for sleeping.   Comments Per Zoey the pt is being treated for skin breakdown on bottom. Patient's breathing has returned to baseline, per Zoey.   What is the patient's perception of their health status since discharge? Returned to baseline/stable   Nursing Interventions Nurse provided patient education   Is the patient/caregiver able to teach back the hierarchy of who to call/visit for symptoms/problems? PCP, Specialist, Home health nurse, Urgent Care, ED, 911 Yes   Patient reports what zone on this call? Green Zone   Green Zone Reports doing well, Usual activity and exercise level, Slept well last night   Green Zone interventions: Take daily medications, Use oxygen as prescribed, Continue regular exercise/diet plan   Week 3 call completed? Yes   Graduated Yes   Call end time 1532            Lavern REBOLLAR - Registered Nurse

## 2025-03-20 ENCOUNTER — INFUSION (OUTPATIENT)
Dept: ONCOLOGY | Facility: HOSPITAL | Age: OVER 89
End: 2025-03-20
Payer: MEDICARE

## 2025-03-20 ENCOUNTER — OFFICE VISIT (OUTPATIENT)
Dept: ONCOLOGY | Facility: CLINIC | Age: OVER 89
End: 2025-03-20
Payer: MEDICARE

## 2025-03-20 ENCOUNTER — LAB (OUTPATIENT)
Dept: LAB | Facility: HOSPITAL | Age: OVER 89
End: 2025-03-20
Payer: MEDICARE

## 2025-03-20 VITALS
DIASTOLIC BLOOD PRESSURE: 67 MMHG | WEIGHT: 173.3 LBS | HEART RATE: 86 BPM | SYSTOLIC BLOOD PRESSURE: 101 MMHG | BODY MASS INDEX: 30.71 KG/M2 | RESPIRATION RATE: 16 BRPM | HEIGHT: 63 IN | OXYGEN SATURATION: 96 % | TEMPERATURE: 97.9 F

## 2025-03-20 DIAGNOSIS — C91.10 CLL (CHRONIC LYMPHOCYTIC LEUKEMIA): ICD-10-CM

## 2025-03-20 DIAGNOSIS — C91.12 CLL (CHRONIC LYMPHOID LEUKEMIA) IN RELAPSE: Primary | ICD-10-CM

## 2025-03-20 DIAGNOSIS — C91.10 CLL (CHRONIC LYMPHOCYTIC LEUKEMIA): Primary | ICD-10-CM

## 2025-03-20 LAB
BASOPHILS # BLD AUTO: 0.02 10*3/MM3 (ref 0–0.2)
BASOPHILS NFR BLD AUTO: 0.2 % (ref 0–1.5)
DEPRECATED RDW RBC AUTO: 53.1 FL (ref 37–54)
EOSINOPHIL # BLD AUTO: 0 10*3/MM3 (ref 0–0.4)
EOSINOPHIL NFR BLD AUTO: 0 % (ref 0.3–6.2)
ERYTHROCYTE [DISTWIDTH] IN BLOOD BY AUTOMATED COUNT: 14.7 % (ref 12.3–15.4)
HCT VFR BLD AUTO: 41 % (ref 34–46.6)
HGB BLD-MCNC: 12.7 G/DL (ref 12–15.9)
IGA1 MFR SER: <50 MG/DL (ref 70–400)
IGG1 SER-MCNC: 888 MG/DL (ref 700–1600)
IGM SERPL-MCNC: 25 MG/DL (ref 40–230)
IMM GRANULOCYTES # BLD AUTO: 0.04 10*3/MM3 (ref 0–0.05)
IMM GRANULOCYTES NFR BLD AUTO: 0.3 % (ref 0–0.5)
LYMPHOCYTES # BLD AUTO: 9.93 10*3/MM3 (ref 0.7–3.1)
LYMPHOCYTES NFR BLD AUTO: 74.7 % (ref 19.6–45.3)
MCH RBC QN AUTO: 30.5 PG (ref 26.6–33)
MCHC RBC AUTO-ENTMCNC: 31 G/DL (ref 31.5–35.7)
MCV RBC AUTO: 98.3 FL (ref 79–97)
MONOCYTES # BLD AUTO: 0.24 10*3/MM3 (ref 0.1–0.9)
MONOCYTES NFR BLD AUTO: 1.8 % (ref 5–12)
NEUTROPHILS NFR BLD AUTO: 23 % (ref 42.7–76)
NEUTROPHILS NFR BLD AUTO: 3.07 10*3/MM3 (ref 1.7–7)
NRBC BLD AUTO-RTO: 0 /100 WBC (ref 0–0.2)
PLATELET # BLD AUTO: 199 10*3/MM3 (ref 140–450)
PMV BLD AUTO: 9.9 FL (ref 6–12)
RBC # BLD AUTO: 4.17 10*6/MM3 (ref 3.77–5.28)
WBC NRBC COR # BLD AUTO: 13.3 10*3/MM3 (ref 3.4–10.8)

## 2025-03-20 PROCEDURE — 63710000001 ACETAMINOPHEN 325 MG TABLET: Performed by: INTERNAL MEDICINE

## 2025-03-20 PROCEDURE — A9270 NON-COVERED ITEM OR SERVICE: HCPCS | Performed by: INTERNAL MEDICINE

## 2025-03-20 PROCEDURE — 96365 THER/PROPH/DIAG IV INF INIT: CPT

## 2025-03-20 PROCEDURE — 96366 THER/PROPH/DIAG IV INF ADDON: CPT

## 2025-03-20 PROCEDURE — 63710000001 HYDROXYZINE PAMOATE PER 25 MG: Performed by: INTERNAL MEDICINE

## 2025-03-20 PROCEDURE — 36415 COLL VENOUS BLD VENIPUNCTURE: CPT

## 2025-03-20 PROCEDURE — 85025 COMPLETE CBC W/AUTO DIFF WBC: CPT

## 2025-03-20 PROCEDURE — 82784 ASSAY IGA/IGD/IGG/IGM EACH: CPT | Performed by: INTERNAL MEDICINE

## 2025-03-20 PROCEDURE — 25010000002 IMMUNE GLOBULIN (HUMAN) 30 GM/300ML SOLUTION: Performed by: INTERNAL MEDICINE

## 2025-03-20 RX ORDER — ACETAMINOPHEN 325 MG/1
650 TABLET ORAL ONCE
Status: COMPLETED | OUTPATIENT
Start: 2025-03-20 | End: 2025-03-20

## 2025-03-20 RX ORDER — DIPHENHYDRAMINE HYDROCHLORIDE 50 MG/ML
50 INJECTION INTRAMUSCULAR; INTRAVENOUS AS NEEDED
Status: CANCELLED | OUTPATIENT
Start: 2025-03-20

## 2025-03-20 RX ORDER — MEPERIDINE HYDROCHLORIDE 50 MG/ML
25 INJECTION INTRAMUSCULAR; INTRAVENOUS; SUBCUTANEOUS
Status: CANCELLED | OUTPATIENT
Start: 2025-03-20 | End: 2025-03-21

## 2025-03-20 RX ORDER — HYDROXYZINE PAMOATE 25 MG/1
25 CAPSULE ORAL ONCE
Status: COMPLETED | OUTPATIENT
Start: 2025-03-20 | End: 2025-03-20

## 2025-03-20 RX ORDER — FAMOTIDINE 10 MG/ML
20 INJECTION, SOLUTION INTRAVENOUS AS NEEDED
Status: CANCELLED | OUTPATIENT
Start: 2025-03-20

## 2025-03-20 RX ORDER — CHOLECALCIFEROL (VITAMIN D3) 25 MCG
1000 TABLET ORAL DAILY
COMMUNITY
Start: 2025-02-25 | End: 2026-02-25

## 2025-03-20 RX ORDER — ACETAMINOPHEN 325 MG/1
650 TABLET ORAL ONCE
Status: CANCELLED | OUTPATIENT
Start: 2025-03-20

## 2025-03-20 RX ORDER — HYDROXYZINE PAMOATE 25 MG/1
25 CAPSULE ORAL ONCE
Status: CANCELLED | OUTPATIENT
Start: 2025-03-20

## 2025-03-20 RX ORDER — SODIUM CHLORIDE 9 MG/ML
250 INJECTION, SOLUTION INTRAVENOUS ONCE
Status: CANCELLED | OUTPATIENT
Start: 2025-03-20

## 2025-03-20 RX ORDER — CARVEDILOL 3.12 MG/1
3.12 TABLET ORAL
COMMUNITY
Start: 2025-01-21

## 2025-03-20 RX ADMIN — IMMUNE GLOBULIN INFUSION (HUMAN) 30 G: 100 INJECTION, SOLUTION INTRAVENOUS; SUBCUTANEOUS at 10:34

## 2025-03-20 RX ADMIN — ACETAMINOPHEN 650 MG: 325 TABLET ORAL at 10:06

## 2025-03-20 RX ADMIN — HYDROXYZINE PAMOATE 25 MG: 25 CAPSULE ORAL at 10:06

## 2025-03-20 NOTE — PROGRESS NOTES
Subjective .     REASONS FOR FOLLOW UP:  1. chronic lymphocytic leukemia with recurrent lung infections  2.  Hypogammaglobulinemia    Referring MD:    Amarilis Suarez MD    History of Present Illness patient is an.age female with 2014 with stage 0 CLL which has never required treatments.    In 2019 she  had multiple hospitalizations for lung infections predominantly viral probably also bacterial and at her last hospitalization in October we rechecked her gammaglobulins which were low and opted to start IV IgG monthly to see if this would keep her out of the hospital. She has continued on monthly IVIG since from October through March .  The patient has less hospitalizations since initiating IVIG.    As she is reviewed back today, she is doing well has had hospitalization last month for COPD     we reviewed her CBC showing no upward trend in her WBC and absolute lymphocyte count. Denies any B symptoms. No change in appetite or weight loss, no night sweats or fever.  Denies any new palpable adenopathy she is feeling about the same, noting that she does not sleep well but this is a longstanding chronic issue.  Her leg edema has improved with diuretics  She has a nailbed infection versus malignancy in her left middle digit which may require amputation down the line  Denies other concerns at this time.    She does want to continue treatment as long as her quality of life is good  She is a DNR  Past Medical History:   Diagnosis Date    Aortic calcification     mild, on echo 12/17/2017    Aortic regurgitation     Trace    Asthma     Atrial fibrillation     CAD (coronary artery disease)     Carpal tunnel syndrome of left wrist     Chronic combined systolic and diastolic congestive heart failure     CKD (chronic kidney disease) stage 3, GFR 30-59 ml/min     COPD (chronic obstructive pulmonary disease)     Coronary artery disease involving native coronary artery of native heart with angina pectoris     Disc degeneration,  lumbar     Diverticulosis     DM type 2 (diabetes mellitus, type 2)     GERD (gastroesophageal reflux disease)     History of aneurysm     right femoral artery s/p LHC    History of blood transfusion     History of fracture     History of heart attack     History of vitamin D deficiency     Hyperlipidemia     Hypertension     Leukemia     Mild mitral regurgitation     Mitral annular calcification     12/8/2017- echo, moderate    Osteopenia     PAF (paroxysmal atrial fibrillation)     Peripheral neuropathy     Skin cancer     Left hand    Sleep apnea     bipap    SSS (sick sinus syndrome)     Stroke (cerebrum)     TIA (transient ischemic attack) 2017    Tricuspid regurgitation     Trace       ONCOLOGIC HISTORY:    [facility] HISTORY  Patient is an 85-year-old female whom I had seen in 2014 when she was hospitalized at [facility], [place], operated by [facility] and was diagnosed with chronic lymphocytic leukemia.  She has not been to see me in over 4 years and is following with Dr. Suarez her primary care doctor and her white count is gone up a little higher than usual to 22,000 and she is referred back to us for evaluation.  We are doing a telephone visit because of the coronavirus pandemic and her age and susceptibility.    When I originally saw her in 2014 she was brought into the ER unresponsive in septic shock on 03/25/2014 with methicillin-resistant staphylococcus identified in her blood cultures. The patient has been on antibiotic with improvement, but had some residual kidney damage with a creatinine in the 4-range requiring hemodialysis, and was noted on admission to have an elevated white count with lymphocytosis, normal hemoglobin, but a platelet count of 95,000, and over the course of the illness her platelet count normalized and the white count had gone up into the 20,000 range with lymphocytosis. A flow cytometry was sent which is consistent with CLL, she came to our office once or twice and then stopped coming because she was so stable      She tells me now she was hospitalized recently with rhinovirus infection and has had diagnosed with asthma and is having a lot of respiratory issues but does not require oxygen.She is at the [facility] home in independent living and managing fairly well    She denies fever sweats or weight loss.  Her last white count was on 5/26/2020  Showed a white count of 18,000 with 66 lymphs and 27 neutrophils platelet white hemoglobin is 12.6 platelet 188,000    I reassured Christopher that no treatment is indicated for this level of leukocytosis from CLL and if she has no systemic complaints I do not feel strongly about doing CAT scans at her age but I would like to physically examine her in the office in a couple of months to make sure there is no obvious adenopathy or hepatosplenomegaly.    She does have recurrent infections and we can check quantitative immunoglobulins to see how low they are as another adjunctive option to prevent recurrent infections if she has hypogammaglobulinemia    She remains on Coumadin for atrial fibrillation      Current Outpatient Medications on File Prior to Visit   Medication Sig Dispense Refill    acetaminophen (TYLENOL) 325 MG tablet Take 2 tablets by mouth Every 4 (Four) Hours As Needed for Mild Pain .      Acetylcysteine (NAC PO) Take 1,000 mg by mouth 2 (Two) Times a Day.      albuterol (PROVENTIL) (2.5 MG/3ML) 0.083% nebulizer solution Take 2.5 mg by nebulization Every 4 (Four) Hours.      Benralizumab (FASENRA SC) Inject 1 dose under the skin into the appropriate area as directed Every 2 (Two) Months. Every 8 weeks      budesonide (PULMICORT) 0.5 MG/2ML nebulizer solution Take 2 mL by nebulization 2 (Two) Times a Day.      Calcium Carbonate-Vitamin D (calcium 500 mg vitamin D 5 mcg, 200 UT,) 500-5 MG-MCG tablet per tablet Take 1 tablet by mouth 2 (Two) Times a Day. 60 tablet 3    carvedilol (COREG) 3.125 MG tablet Take 1 tablet by mouth.      Cetirizine HCl 10 MG capsule Take 1 capsule by  mouth Daily.      Cholecalciferol 25 MCG (1000 UT) tablet Take 1 tablet by mouth Daily.      fluticasone (FLONASE) 50 MCG/ACT nasal spray Administer 1 spray into the nostril(s) as directed by provider Daily.      furosemide (LASIX) 20 MG tablet Take 1 tablet by mouth As Needed (For weight gain of greater than 2 pounds in 1 day or 5 pounds in 1 week). (Patient taking differently: Take 1 tablet by mouth Daily.) 30 tablet 11    glipizide (GLUCOTROL) 5 MG tablet Take 1 tablet by mouth Daily With Breakfast.      ipratropium-albuterol (DUO-NEB) 0.5-2.5 mg/3 ml nebulizer Take 3 mL by nebulization 4 (Four) Times a Day. 360 mL 0    loperamide (IMODIUM) 2 MG capsule Take 1 capsule by mouth 4 (Four) Times a Day As Needed for Diarrhea.      melatonin 5 MG tablet tablet Take 1 tablet by mouth Every Night.      metFORMIN ER (GLUCOPHAGE-XR) 500 MG 24 hr tablet Take 1 tablet by mouth Daily With Breakfast.      montelukast (SINGULAIR) 10 MG tablet Take 1 tablet by mouth Every Night.      oxybutynin XL (DITROPAN-XL) 10 MG 24 hr tablet Take 1 tablet by mouth 2 (Two) Times a Day.      rosuvastatin (CRESTOR) 20 MG tablet Take 1 tablet by mouth Every Night.      warfarin (COUMADIN) 4 MG tablet Take one tablet by mouth on mon, wed, fri and take one-half of a tablet by mouth all other days or as directed 65 tablet 1    [] sodium chloride 7 % nebulizer solution nebulizer solution Take 4 mL by nebulization 3 (Three) Times a Day for 7 days. 240 mL 0     No current facility-administered medications on file prior to visit.       ALLERGIES:     Allergies   Allergen Reactions    Accupril [Quinapril Hcl] Swelling, Other (See Comments), GI Intolerance and Delirium     HA, constipation     Ahist [Chlorpheniramine] Nausea Only, Other (See Comments) and Dizziness     Headache, Blurred vision    Clarithromycin Nausea Only, Other (See Comments) and Mental Status Change     HA, Depression, Flushing    Esomeprazole GI Intolerance    Latex Other  (See Comments)     Skin breakdown    Levalbuterol Swelling    Levocetirizine Diarrhea and GI Intolerance    Lipitor [Atorvastatin] Other (See Comments) and Myalgia     Dark urine    Omeprazole Nausea Only and Other (See Comments)     HA    Pravachol [Pravastatin] Nausea Only and GI Intolerance     Bloated, Constipation, HA    Sulindac Other (See Comments) and Myalgia     HA, joint pain, bruising    Valdecoxib Irritability    Chlorcyclizine Unknown - Low Severity    Chlorpheniramine-Phenylephrine Unknown - High Severity    Diclofenac Sodium Unknown - Low Severity    Diphenhydramine Unknown - Low Severity    Lodine [Etodolac] Unknown - Low Severity    Sulfa Antibiotics Unknown - Low Severity       Social History     Socioeconomic History    Marital status: Single   Tobacco Use    Smoking status: Never     Passive exposure: Never    Smokeless tobacco: Never    Tobacco comments:     caffeine use- soda   Vaping Use    Vaping status: Never Used   Substance and Sexual Activity    Alcohol use: Never    Drug use: No    Sexual activity: Defer         Cancer-related family history includes Colon cancer in her sister.     I have reviewed the patient's medical history in detail and updated the computerized patient record.    Review of Systems  I have reviewed and confirmed the accuracy of the ROS as documented by the MA/LPN/RN Lucien Larkin MD      Objective      Vitals:    03/20/25 0919   BP: 101/67   Pulse: 86   Resp: 16   Temp: 97.9 °F (36.6 °C)   SpO2: 96%             3/20/2025     9:15 AM   Current Status   ECOG score 1     Pain Score    03/20/25 0919   PainSc: 0-No pain           CONSTITUTIONAL:  Vital signs reviewed.  No distress, looks comfortable.  EYES:  Conjunctiva and lids unremarkable.  PERRLA  EARS,NOSE,MOUTH,THROAT: Hearing intact.  Lips, teeth, gums appear unremarkable.  RESPIRATORY:  Normal respiratory effort.  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  No murmurs rubs or gallops.  1+ lower extremity edema.  GASTROINTESTINAL: Abdomen unremarkable.  Nontender. No hepatosplenomegaly.   LYMPHATIC:  No cervical, supraclavicular, inguinal lymphadenopathy.  SKIN:  Warm.  No rashes.  Numerous ecchymosis on her arms and legs  PSYCHIATRIC:  Normal judgment and insight.  Normal mood and affect.     I have reexamined the patient 03/20/2025 and the results are consistent with the previously documented exam except as updated. Lucien Larkin MD       RECENT LABS:  Results from last 7 days   Lab Units 03/20/25  0856   WBC 10*3/mm3 13.30*   NEUTROS ABS 10*3/mm3 3.07   HEMOGLOBIN g/dL 12.7   HEMATOCRIT % 41.0   PLATELETS 10*3/mm3 199                 Assessment & Plan    1. Chronic lymphocytic leukemia  -stage 0 since 2014 untreated  White count increased to 32,000 in 12/22 after prednisone pack.    1/11/2024 reviewed with patient slight trend and WBC up to 18.25, ALC up to 14%.  She has no other concerning findings clinically and we discussed continued observation for now.  White count increased to 45,000 in 10/24 on prednisone 40 mg  WBC down to 9000 in 12/24  WBCs 13,000 in 3/25    2.  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  The patient received IVIG treatments October 2020 through March 2021.  We have held IVIG since that time and the patient is happy to report no infections aside from some urinary tract infection which she has chronically.  Otherwise she has been feeling very well.  Resumes monthly IVIG from October through March on a yearly basis  Resuming IV IgG from October 2024 to March 2025    3.  COPD/asthma /emphysema on home O2-recurrent respiratory infections with RSV and rhinovirus and bacteria    4.  Atrial fibrillation on Coumadin.  Stable with no bleeding.    5.  Hypertension/hypercholesterolemia/type 2 diabetes on treatment    6.   Patient is a DNR    Plan:  Proceed with 30g IVIG for hypogammaglobulinemia.  This is being given monthly October through March   Return in 6 months for follow-up with Dr. Larkin Keep an eye on WBC/ALC in setting of CLL.  Discussed course of continued observation for now.      This patient is on high risk drug therapy requiring intensive monitoring for toxicity.    Patient is receiving IVIG, tolerating it without difficulty and continuing to receive benefit.  Continue IVIG at current dosage and schedule.      Lucien Larkin MD  03/20/2025

## 2025-03-21 ENCOUNTER — ANTICOAGULATION VISIT (OUTPATIENT)
Dept: PHARMACY | Facility: HOSPITAL | Age: OVER 89
End: 2025-03-21
Payer: COMMERCIAL

## 2025-03-21 DIAGNOSIS — I48.0 PAROXYSMAL ATRIAL FIBRILLATION: Primary | ICD-10-CM

## 2025-03-21 LAB — INR PPP: 4

## 2025-03-21 NOTE — PROGRESS NOTES
Anticoagulation Clinic Progress Note    Anticoagulation Summary  As of 3/21/2025      INR goal:  2.0-3.0   TTR:  64.7% (6.2 y)   INR used for dosin.00 (3/21/2025)   Warfarin maintenance plan:  4 mg every Mon, Wed, Fri; 2 mg all other days   Weekly warfarin total:  20 mg   Plan last modified:  Alexander Valiente, PharmD (2025)   Next INR check:  2025   Priority:  Maintenance   Target end date:  Indefinite    Indications    Paroxysmal atrial fibrillation [I48.0]                 Anticoagulation Episode Summary       INR check location:  --    Preferred lab:  --    Send INR reminders to:   JACEY SINCLAIR  POOL    Comments:  [facility] lab beginning 20          Anticoagulation Care Providers       Provider Role Specialty Phone number    Jonah Regalado Jr., MD Referring Cardiology [phone]            Clinic Interview:  Patient Findings     Negatives:  Signs/symptoms of thrombosis, Signs/symptoms of bleeding,   Laboratory test error suspected, Change in health, Change in alcohol use,   Change in activity, Upcoming invasive procedure, Emergency department   visit, Upcoming dental procedure, Missed doses, Extra doses, Change in   medications, Change in diet/appetite, Hospital admission, Bruising, Other   complaints      Clinical Outcomes     Negatives:  Major bleeding event, Thromboembolic event,   Anticoagulation-related hospital admission, Anticoagulation-related ED   visit, Anticoagulation-related fatality        INR History:      2025     6:55 PM 2025     5:38 AM 2025     3:44 PM 3/7/2025    12:00 AM 3/7/2025    10:27 AM 3/21/2025    12:00 AM 3/21/2025    10:42 AM   Anticoagulation Monitoring   INR   1.72  2.30  4.00   INR Date   2025  3/7/2025  3/21/2025   INR Goal   2.0-3.0  2.0-3.0  2.0-3.0   Trend   Same  Same  Same   Last Week Total   18 mg  20 mg  20 mg   Next Week Total   20 mg  20 mg  16 mg   Sun   2 mg  2 mg  2 mg   Mon   4 mg  4 mg  4 mg   Tue   2 mg  2 mg  2 mg    Wed   4 mg  4 mg  4 mg   Thu   2 mg  2 mg  2 mg   Fri   4 mg  4 mg  Hold (3/21); Otherwise 4 mg   Sat   2 mg  2 mg  2 mg   Historical INR 1.60  1.72   2.30      4.00            This result is from an external source.       Plan:  1. INR is Supratherapeutic today- see above in Anticoagulation Summary.   Will instruct Caitlyn Longoria to Change their warfarin regimen- see above in Anticoagulation Summary.  She denies any changes in medications, diet, etc. Hold warfarin today then resume 4 mg MWF, 2 mg AOD. Advised an INR check in 1.5 weeks. Patient is unable to return to [facility] clinic until 4/7 and she is unable to get it checked elsewhere.   2. Follow up in 2 weeks  3. They have been instructed to call if any changes in medications, doses, concerns, etc. Patient expresses understanding and has no further questions at this time.    Petra Oliveira [facility]

## 2025-03-27 ENCOUNTER — OFFICE VISIT (OUTPATIENT)
Dept: WOUND CARE | Facility: HOSPITAL | Age: OVER 89
End: 2025-03-27
Payer: MEDICARE

## 2025-03-27 PROCEDURE — G0463 HOSPITAL OUTPT CLINIC VISIT: HCPCS

## 2025-04-09 ENCOUNTER — ANTICOAGULATION VISIT (OUTPATIENT)
Dept: PHARMACY | Facility: HOSPITAL | Age: OVER 89
End: 2025-04-09
Payer: COMMERCIAL

## 2025-04-09 DIAGNOSIS — I48.0 PAROXYSMAL ATRIAL FIBRILLATION: Primary | ICD-10-CM

## 2025-04-09 LAB — INR PPP: 8

## 2025-04-09 NOTE — PROGRESS NOTES
Anticoagulation Clinic Progress Note    Anticoagulation Summary  As of 2025      INR goal:  2.0-3.0   TTR:  64.1% (6.2 y)   INR used for dosin.00 (2025)   Warfarin maintenance plan:  4 mg every Mon, Wed, Fri; 2 mg all other days   Weekly warfarin total:  20 mg   Plan last modified:  Alexander Valiente, PharmD (2025)   Next INR check:  2025   Priority:  Maintenance   Target end date:  Indefinite    Indications    Paroxysmal atrial fibrillation [I48.0]                 Anticoagulation Episode Summary       INR check location:  --    Preferred lab:  --    Send INR reminders to:   JACEY SINCLAIR  POOL    Comments:  [facility] lab beginning 20          Anticoagulation Care Providers       Provider Role Specialty Phone number    Jonah Regalado Jr., MD Referring Cardiology [phone]          Findings:  Patient Findings     Positives:  Signs/symptoms of bleeding    Negatives:  Signs/symptoms of thrombosis, Laboratory test error   suspected, Change in health, Change in alcohol use, Change in activity,   Upcoming invasive procedure, Emergency department visit, Upcoming dental   procedure, Missed doses, Extra doses, Change in medications, Change in   diet/appetite, Hospital admission, Bruising, Other complaints    Comments:  No explanation for elevated INR. [facility] is newly   visiting Mon/Wed/Fri for wound care, and will be through at least 25.   Denies any s/sx of bleeding aside from a couple recent nosebleeds, which   she was able to resolve quickly.       Clinical Outcomes     Negatives:  Major bleeding event, Thromboembolic event,   Anticoagulation-related hospital admission, Anticoagulation-related ED   visit, Anticoagulation-related fatality    Comments:  No explanation for elevated INR. [facility] is newly   visiting Mon/Wed/Fri for wound care, and will be through at least 25.   Denies any s/sx of bleeding aside from a couple recent nosebleeds, which   she was  able to resolve quickly.      INR History:      2/28/2025     3:44 PM 3/7/2025    12:00 AM 3/7/2025    10:27 AM 3/21/2025    12:00 AM 3/21/2025    10:42 AM 4/9/2025    12:00 AM 4/9/2025    11:35 AM   Anticoagulation Monitoring   INR 1.72  2.30  4.00  8.00   INR Date 2/27/2025  3/7/2025  3/21/2025  4/9/2025   INR Goal 2.0-3.0  2.0-3.0  2.0-3.0  2.0-3.0   Trend Same  Same  Same  Same   Last Week Total 18 mg  20 mg  20 mg  20 mg   Next Week Total 20 mg  20 mg  16 mg  14 mg   Sun 2 mg  2 mg  2 mg  -   Mon 4 mg  4 mg  4 mg  -   Tue 2 mg  2 mg  2 mg  -   Wed 4 mg  4 mg  4 mg  Hold (4/9)   Thu 2 mg  2 mg  2 mg  Hold (4/10)   Fri 4 mg  4 mg  Hold (3/21); Otherwise 4 mg  -   Sat 2 mg  2 mg  2 mg  -   Historical INR  2.30      4.00      8.00            This result is from an external source.       Plan:  1. INR is Supratherapeutic today- see above in Anticoagulation Summary.   Provided instructions to Ms. Longoria and Yenny with [facility] to HOLD their warfarin regimen - see above in Anticoagulation Summary.  2. Follow up in 2 days with INR through [facility].   3. She was instructed to seek immediate medical attention if s/sx of bleeding develop or fall occurs.     Alexander Valiente, PharmD

## 2025-04-10 ENCOUNTER — OFFICE VISIT (OUTPATIENT)
Dept: WOUND CARE | Facility: HOSPITAL | Age: OVER 89
End: 2025-04-10
Payer: MEDICARE

## 2025-04-10 PROCEDURE — G0463 HOSPITAL OUTPT CLINIC VISIT: HCPCS

## 2025-04-11 ENCOUNTER — ANTICOAGULATION VISIT (OUTPATIENT)
Dept: PHARMACY | Facility: HOSPITAL | Age: OVER 89
End: 2025-04-11
Payer: COMMERCIAL

## 2025-04-11 ENCOUNTER — OFFICE VISIT (OUTPATIENT)
Dept: INFECTIOUS DISEASES | Facility: CLINIC | Age: OVER 89
End: 2025-04-11
Payer: MEDICARE

## 2025-04-11 VITALS
RESPIRATION RATE: 20 BRPM | HEART RATE: 83 BPM | SYSTOLIC BLOOD PRESSURE: 98 MMHG | TEMPERATURE: 97.3 F | DIASTOLIC BLOOD PRESSURE: 66 MMHG

## 2025-04-11 DIAGNOSIS — I48.0 PAROXYSMAL ATRIAL FIBRILLATION: Primary | ICD-10-CM

## 2025-04-11 DIAGNOSIS — A49.01 MSSA (METHICILLIN SUSCEPTIBLE STAPHYLOCOCCUS AUREUS) INFECTION: Primary | ICD-10-CM

## 2025-04-11 DIAGNOSIS — E11.9 CONTROLLED TYPE 2 DIABETES MELLITUS WITHOUT COMPLICATION, UNSPECIFIED WHETHER LONG TERM INSULIN USE: ICD-10-CM

## 2025-04-11 DIAGNOSIS — D80.1 HYPOGAMMAGLOBULINEMIA: ICD-10-CM

## 2025-04-11 DIAGNOSIS — Z79.2 LONG TERM (CURRENT) USE OF ANTIBIOTICS: ICD-10-CM

## 2025-04-11 LAB — INR PPP: 3.9

## 2025-04-11 RX ORDER — CEPHALEXIN 500 MG/1
500 CAPSULE ORAL 3 TIMES DAILY
COMMUNITY
Start: 2025-03-04 | End: 2025-04-11 | Stop reason: SDUPTHER

## 2025-04-11 RX ORDER — CEPHALEXIN 500 MG/1
500 CAPSULE ORAL 3 TIMES DAILY
Qty: 270 CAPSULE | Refills: 3 | Status: SHIPPED | OUTPATIENT
Start: 2025-04-11 | End: 2025-07-10

## 2025-04-11 NOTE — PROGRESS NOTES
Anticoagulation Clinic Progress Note    Anticoagulation Summary  As of 4/11/2025      INR goal:  2.0-3.0   TTR:  64.1% (6.3 y)   INR used for dosing:  3.90 (4/11/2025)   Warfarin maintenance plan:  2 mg every day   Weekly warfarin total:  14 mg   Plan last modified:  Alexander Valiente, PharmD (4/11/2025)   Next INR check:  4/14/2025   Priority:  Maintenance   Target end date:  Indefinite    Indications    Paroxysmal atrial fibrillation [I48.0]                 Anticoagulation Episode Summary       INR check location:  --    Preferred lab:  --    Send INR reminders to:   JACEY SINCLAIR  POOL    Comments:  [facility] beginning 4/22/20          Anticoagulation Care Providers       Provider Role Specialty Phone number    Jonah Regalado Jr., MD Referring Cardiology [phone]          Findings:  Patient Findings     Negatives:  Signs/symptoms of thrombosis, Signs/symptoms of bleeding,   Laboratory test error suspected, Change in health, Change in alcohol use,   Change in activity, Upcoming invasive procedure, Emergency department   visit, Upcoming dental procedure, Missed doses, Extra doses, Change in   medications, Change in diet/appetite, Hospital admission, Bruising, Other   complaints    Comments:  Warfarin has been held the past 2 days as instructed following   POC INR >8.0 on 4/9/25.      Clinical Outcomes     Negatives:  Major bleeding event, Thromboembolic event,   Anticoagulation-related hospital admission, Anticoagulation-related ED   visit, Anticoagulation-related fatality    Comments:  Warfarin has been held the past 2 days as instructed following   POC INR >8.0 on 4/9/25.          INR History:      3/7/2025    10:27 AM 3/21/2025    12:00 AM 3/21/2025    10:42 AM 4/9/2025    12:00 AM 4/9/2025    11:35 AM 4/11/2025    12:00 AM 4/11/2025     1:32 PM   Anticoagulation Monitoring   INR 2.30  4.00  8.00  3.90   INR Date 3/7/2025  3/21/2025  4/9/2025  4/11/2025   INR Goal 2.0-3.0  2.0-3.0  2.0-3.0   2.0-3.0   Trend Same  Same  Same  Down   Last Week Total 20 mg  20 mg  20 mg  14 mg   Next Week Total 20 mg  16 mg  14 mg  14 mg   Sun 2 mg  2 mg  -  2 mg   Mon 4 mg  4 mg  -  -   Tue 2 mg  2 mg  -  -   Wed 4 mg  4 mg  Hold (4/9)  -   Thu 2 mg  2 mg  Hold (4/10)  -   Fri 4 mg  Hold (3/21); Otherwise 4 mg  -  2 mg   Sat 2 mg  2 mg  -  2 mg   Historical INR  4.00      8.00      3.90        Visit Report      Report Report       This result is from an external source.       Plan:  1. INR is Supratherapeutic today- see above in Anticoagulation Summary. Expect that INR will continue to decrease following the held warfarin doses the past 2 days.   Provided instructions to Ms. Longoria and Yenny with [facility] to resume their warfarin regimen at reduced dose of 2 mg daily - see above in Anticoagulation Summary.  2. Follow up in 3 days.    Alexander Valiente, PharmD

## 2025-04-11 NOTE — PROGRESS NOTES
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 female here for f/u prosthetic right knee infection - culture negative though had a prior history of MSSA. She is on suppressive cephalexin 500 mg PO q8h since she is not a surgical candidate (or at least a poor surgical candidate) per my prior discussions Dr Hunter. She was initially on suppressive cefadroxil 500 mg PO BID but insurance changes caused the price to be too high.     She says her right knee continues to do well. She is tolerating cephalexin w/o any side effects.    She graduated from the wound clinic yesterday. They had been treating some pressure sores on her buttocks. These areas are now resolved.     She continues on IVIg for hypogammaglobulinemia. She also follows with oncology for CLL. She remains on coumadin for Afib.    She had a recent admission in February 2025 due to non-COVID coronavirus infection and acute exacerbation of COPD.  She improved with steroids, nebulizers, and mucolytic's per my review of the discharge summary.      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 Sulfa  2. Clarithromycin - headache    Medications:   Current Outpatient Medications:     acetaminophen (TYLENOL) 325 MG tablet, Take 2 tablets by mouth Every 4 (Four) Hours As Needed for Mild Pain ., Disp:  , Rfl:     Acetylcysteine (NAC PO), Take 1,000 mg by mouth 2 (Two) Times a Day., Disp: , Rfl:     albuterol (PROVENTIL) (2.5 MG/3ML) 0.083% nebulizer solution, Take 2.5 mg by nebulization Every 4 (Four) Hours., Disp: , Rfl:     Benralizumab (FASENRA SC), Inject 1 dose under the skin into the  appropriate area as directed Every 2 (Two) Months. Every 8 weeks, Disp: , Rfl:     budesonide (PULMICORT) 0.5 MG/2ML nebulizer solution, Take 2 mL by nebulization 2 (Two) Times a Day., Disp: , Rfl:     Calcium Carbonate-Vitamin D (calcium 500 mg vitamin D 5 mcg, 200 UT,) 500-5 MG-MCG tablet per tablet, Take 1 tablet by mouth 2 (Two) Times a Day., Disp: 60 tablet, Rfl: 3    carvedilol (COREG) 3.125 MG tablet, Take 1 tablet by mouth., Disp: , Rfl:     cephalexin (KEFLEX) 500 MG capsule, Take 1 capsule by mouth 3 (Three) Times a Day., Disp: , Rfl:     Cetirizine HCl 10 MG capsule, Take 1 capsule by mouth Daily., Disp: , Rfl:     Cholecalciferol 25 MCG (1000 UT) tablet, Take 1 tablet by mouth Daily., Disp: , Rfl:     fluticasone (FLONASE) 50 MCG/ACT nasal spray, Administer 1 spray into the nostril(s) as directed by provider Daily., Disp: , Rfl:     furosemide (LASIX) 20 MG tablet, Take 1 tablet by mouth As Needed (For weight gain of greater than 2 pounds in 1 day or 5 pounds in 1 week). (Patient taking differently: Take 1 tablet by mouth Daily.), Disp: 30 tablet, Rfl: 11    glipizide (GLUCOTROL) 5 MG tablet, Take 1 tablet by mouth Daily With Breakfast., Disp: , Rfl:     ipratropium-albuterol (DUO-NEB) 0.5-2.5 mg/3 ml nebulizer, Take 3 mL by nebulization 4 (Four) Times a Day., Disp: 360 mL, Rfl: 0    loperamide (IMODIUM) 2 MG capsule, Take 1 capsule by mouth 4 (Four) Times a Day As Needed for Diarrhea., Disp: , Rfl:     melatonin 5 MG tablet tablet, Take 1 tablet by mouth Every Night., Disp: , Rfl:     metFORMIN ER (GLUCOPHAGE-XR) 500 MG 24 hr tablet, Take 1 tablet by mouth Daily With Breakfast., Disp: , Rfl:     montelukast (SINGULAIR) 10 MG tablet, Take 1 tablet by mouth Every Night., Disp: , Rfl:     oxybutynin XL (DITROPAN-XL) 10 MG 24 hr tablet, Take 1 tablet by mouth 2 (Two) Times a Day., Disp: , Rfl:     rosuvastatin (CRESTOR) 20 MG tablet, Take 1 tablet by mouth Every Night., Disp: , Rfl:     warfarin (COUMADIN)  4 MG tablet, Take one tablet by mouth on mon, wed, fri and take one-half of a tablet by mouth all other days or as directed, Disp: 65 tablet, Rfl: 1    OBJECTIVE:  BP 98/66 Comment: Denies having dizziness, blurred vision, or chest pain.  Pulse 83   Temp 97.3 °F (36.3 °C)   Resp 20   LMP  (LMP Unknown)       General: awake, alert, NAD, very nice  Eyes: no scleral icterus  Respiratory: normal work of breathing on RA  Musculoskeletal: R knee incision is healed; not warm or tender  Skin: BLEs wrapped  Neurological: Alert and oriented x 3  Psychiatric: Normal mood and affect     DIAGNOSTICS:  CBC, BMP, INR, and A1c reviewed today  Lab Results   Component Value Date    WBC 13.30 (H) 03/20/2025    HGB 12.7 03/20/2025    HCT 41.0 03/20/2025    MCV 98.3 (H) 03/20/2025     03/20/2025     Lab Results   Component Value Date    GLUCOSE 214 (H) 02/27/2025    CALCIUM 8.8 02/27/2025     02/27/2025    K 4.1 02/27/2025    CO2 25.0 02/27/2025     02/27/2025    BUN 18 02/27/2025    CREATININE 0.70 02/27/2025    EGFRIFAFRI >60 10/03/2022    EGFRIFNONA 60 (L) 02/08/2022    BCR 25.7 (H) 02/27/2025    ANIONGAP 9.0 02/27/2025     Lab Results   Component Value Date    INR 3.90 04/11/2025    INR 8.00 04/09/2025    INR 4.00 03/21/2025    PROTIME 20.2 (H) 02/27/2025    PROTIME 19.1 (H) 02/25/2025    PROTIME 23.0 (H) 02/07/2025     Lab Results   Component Value Date    HGBA1C 6.80 (H) 08/23/2024     Lab Results   Component Value Date    INR 3.90 04/11/2025    INR 8.00 04/09/2025    INR 4.00 03/21/2025    PROTIME 20.2 (H) 02/27/2025    PROTIME 19.1 (H) 02/25/2025    PROTIME 23.0 (H) 02/07/2025       ASSESSMENT/PLAN:  1. 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 Long term use of antibiotics  -recent labs reviewed    3. Hypogammaglobulinemia  -on IVIg infusions during the winter months through the [facility]    4. CLL, stage 0  - no treatment required at this time    5. Afib on Coumadin    6. Controlled DM2 for age- A1c 6l8    7. Pseudomonas wound infection  -resolved    RTC 12 months or sooner if needed

## 2025-04-14 ENCOUNTER — ANTICOAGULATION VISIT (OUTPATIENT)
Dept: PHARMACY | Facility: HOSPITAL | Age: OVER 89
End: 2025-04-14
Payer: MEDICARE

## 2025-04-14 DIAGNOSIS — I48.0 PAROXYSMAL ATRIAL FIBRILLATION: Primary | ICD-10-CM

## 2025-04-14 LAB — INR PPP: 2.9

## 2025-04-14 NOTE — PROGRESS NOTES
Anticoagulation Clinic Progress Note    Anticoagulation Summary  As of 2025      INR goal:  2.0-3.0   TTR:  64.0% (6.3 y)   INR used for dosin.90 (2025)   Warfarin maintenance plan:  2 mg every day   Weekly warfarin total:  14 mg   No change documented:  Ileana Godinez, CHILO   Plan last modified:  Alexander Valiente, PharmD (2025)   Next INR check:  2025   Priority:  Maintenance   Target end date:  Indefinite    Indications    Paroxysmal atrial fibrillation [I48.0]                 Anticoagulation Episode Summary       INR check location:  --    Preferred lab:  --    Send INR reminders to:   JACEY LocPlanet  POOL    Comments:  [facility] beginning 20          Anticoagulation Care Providers       Provider Role Specialty Phone number    Jonah Regalado Jr., MD Referring Cardiology [phone]            Clinic Interview:  Patient Findings     Negatives:  Signs/symptoms of thrombosis, Signs/symptoms of bleeding,   Laboratory test error suspected, Change in health, Change in alcohol use,   Change in activity, Upcoming invasive procedure, Emergency department   visit, Upcoming dental procedure, Missed doses, Extra doses, Change in   medications, Change in diet/appetite, Hospital admission, Bruising, Other   complaints      Clinical Outcomes     Negatives:  Major bleeding event, Thromboembolic event,   Anticoagulation-related hospital admission, Anticoagulation-related ED   visit, Anticoagulation-related fatality        INR History:      3/21/2025    10:42 AM 2025    12:00 AM 2025    11:35 AM 2025    12:00 AM 2025     1:32 PM 2025    12:00 AM 2025     1:48 PM   Anticoagulation Monitoring   INR 4.00  8.00  3.90  2.90   INR Date 3/21/2025  2025  2025  2025   INR Goal 2.0-3.0  2.0-3.0  2.0-3.0  2.0-3.0   Trend Same  Same  Down  Same   Last Week Total 20 mg  20 mg  14 mg  12 mg   Next Week Total 16 mg  14 mg  14 mg  14 mg   Sun 2 mg  -  2 mg  -    Mon 4 mg  -  -  2 mg   Tue 2 mg  -  -  2 mg   Wed 4 mg  Hold (4/9)  -  2 mg   Thu 2 mg  Hold (4/10)  -  2 mg   Fri Hold (3/21); Otherwise 4 mg  -  2 mg  -   Sat 2 mg  -  2 mg  -   Historical INR  8.00      3.90      2.90        Visit Report    Report Report         This result is from an external source.       Plan:  1. INR is Therapeutic today- see above in Anticoagulation Summary.   Will instruct Caitlyn GARRETT Longoria to Continue their warfarin regimen- see above in Anticoagulation Summary.  2. Follow up in 4 days  3. They have been instructed to call if any changes in medications, doses, concerns, etc. Patient expresses understanding and has no further questions at this time.    Ileana Godinez [facility]

## 2025-04-18 ENCOUNTER — ANTICOAGULATION VISIT (OUTPATIENT)
Dept: PHARMACY | Facility: HOSPITAL | Age: OVER 89
End: 2025-04-18
Payer: MEDICARE

## 2025-04-18 DIAGNOSIS — I48.0 PAROXYSMAL ATRIAL FIBRILLATION: Primary | ICD-10-CM

## 2025-04-18 LAB — INR PPP: 3.4

## 2025-04-18 NOTE — PROGRESS NOTES
Anticoagulation Clinic Progress Note    Anticoagulation Summary  As of 4/18/2025      INR goal:  2.0-3.0   TTR:  63.9% (6.3 y)   INR used for dosing:  3.40 (4/18/2025)   Warfarin maintenance plan:  2 mg every day   Weekly warfarin total:  14 mg   Plan last modified:  Alexander Valiente, PharmD (4/11/2025)   Next INR check:  4/21/2025   Priority:  Maintenance   Target end date:  Indefinite    Indications    Paroxysmal atrial fibrillation [I48.0]                 Anticoagulation Episode Summary       INR check location:  --    Preferred lab:  --    Send INR reminders to:   JACEY SINCLAIR  POOL    Comments:  [facility] beginning 4/22/20          Anticoagulation Care Providers       Provider Role Specialty Phone number    Jonah Regalado Jr., MD Referring Cardiology [phone]          Patient Reported Findings    Patient Findings     Positives:  Change in medications, Change in diet/appetite    Negatives:  Signs/symptoms of thrombosis, Signs/symptoms of bleeding,   Laboratory test error suspected, Change in health, Change in alcohol use,   Change in activity, Upcoming invasive procedure, Emergency department   visit, Upcoming dental procedure, Missed doses, Extra doses, Hospital   admission, Bruising, Other complaints    Comments:  Pt reports decrease in appetite. Pt reports fall onto bottom   but she denies hitting head or loss of consciousness. She reports 6 days   left of prednisone course 10 mg for 3 days followed by 5 mg for the   remaining 3 as well as 2 days remaining on a Z-nia.       Clinical Outcomes     Negatives:  Major bleeding event, Thromboembolic event,   Anticoagulation-related hospital admission, Anticoagulation-related ED   visit, Anticoagulation-related fatality    Comments:  Pt reports decrease in appetite. Pt reports fall onto bottom   but she denies hitting head or loss of consciousness. She reports 6 days   left of prednisone course 10 mg for 3 days followed by 5 mg for the   remaining  3 as well as 2 days remaining on a Z-nia.        INR History:      4/9/2025    11:35 AM 4/11/2025    12:00 AM 4/11/2025     1:32 PM 4/14/2025    12:00 AM 4/14/2025     1:48 PM 4/18/2025    12:00 AM 4/18/2025     2:29 PM   Anticoagulation Monitoring   INR 8.00  3.90  2.90  3.40   INR Date 4/9/2025 4/11/2025 4/14/2025 4/18/2025   INR Goal 2.0-3.0  2.0-3.0  2.0-3.0  2.0-3.0   Trend Same  Down  Same  Same   Last Week Total 20 mg  14 mg  12 mg  14 mg   Next Week Total 14 mg  14 mg  14 mg  12 mg   Sun -  2 mg  -  2 mg   Mon -  -  2 mg  -   Tue -  -  2 mg  -   Wed Hold (4/9)  -  2 mg  -   Thu Hold (4/10)  -  2 mg  -   Fri -  2 mg  -  Hold (4/18)   Sat -  2 mg  -  2 mg   Historical INR  3.90      2.90      3.40        Visit Report  Report Report           This result is from an external source.       Plan:  1. INR is Supratherapeutic today- see above in Anticoagulation Summary.   Provided instructions to Maria Alejandra with [facility] to Change their warfarin regimen- see above in Anticoagulation Summary. Discussed with Maria Alejandra and pt to have her evaluate patient effected area from fall.   2. Follow up in 3 days      Ileana Godinez [facility]

## 2025-04-18 NOTE — PROGRESS NOTES
Anticoagulation Clinic Progress Note    Anticoagulation Summary  As of 4/18/2025      INR goal:  2.0-3.0   TTR:  63.9% (6.3 y)   INR used for dosing:  3.40 (4/18/2025)   Warfarin maintenance plan:  2 mg every day   Weekly warfarin total:  14 mg   Plan last modified:  Alexander Valiente, PharmD (4/11/2025)   Next INR check:  4/21/2025   Priority:  Maintenance   Target end date:  Indefinite    Indications    Paroxysmal atrial fibrillation [I48.0]                 Anticoagulation Episode Summary       INR check location:  --    Preferred lab:  --    Send INR reminders to:  MAGALIS SINCLAIR  POOL    Comments:  [facility] beginning 4/22/20          Anticoagulation Care Providers       Provider Role Specialty Phone number    Jonah Regalado Jr., MD Referring Cardiology [phone]            Clinic Interview:  Patient Findings     Positives:  Change in diet/appetite    Negatives:  Signs/symptoms of thrombosis, Signs/symptoms of bleeding,   Laboratory test error suspected, Change in health, Change in alcohol use,   Change in activity, Upcoming invasive procedure, Emergency department   visit, Upcoming dental procedure, Missed doses, Extra doses, Change in   medications, Hospital admission, Bruising, Other complaints    Comments:  Pt reports not being as hungry. Pt reports fall onto bottom   she denies hitting head or loss of consciousness.       Clinical Outcomes     Negatives:  Major bleeding event, Thromboembolic event,   Anticoagulation-related hospital admission, Anticoagulation-related ED   visit, Anticoagulation-related fatality    Comments:  Pt reports not being as hungry. Pt reports fall onto bottom   she denies hitting head or loss of consciousness.         INR History:      4/9/2025    11:35 AM 4/11/2025    12:00 AM 4/11/2025     1:32 PM 4/14/2025    12:00 AM 4/14/2025     1:48 PM 4/18/2025    12:00 AM 4/18/2025     2:29 PM   Anticoagulation Monitoring   INR 8.00  3.90  2.90  3.40   INR Date 4/9/2025   4/11/2025 4/14/2025 4/18/2025   INR Goal 2.0-3.0  2.0-3.0  2.0-3.0  2.0-3.0   Trend Same  Down  Same  Same   Last Week Total 20 mg  14 mg  12 mg  14 mg   Next Week Total 14 mg  14 mg  14 mg  12 mg   Sun -  2 mg  -  2 mg   Mon -  -  2 mg  -   Tue -  -  2 mg  -   Wed Hold (4/9)  -  2 mg  -   Thu Hold (4/10)  -  2 mg  -   Fri -  2 mg  -  Hold (4/18)   Sat -  2 mg  -  2 mg   Historical INR  3.90      2.90      3.40        Visit Report  Report Report           This result is from an external source.       Plan:  1. INR is Supratherapeutic today- see above in Anticoagulation Summary.   Will instruct Caitlyn Longoria to Change their warfarin regimen- see above in Anticoagulation Summary.  2. Follow up in 3 days  3. They have been instructed to call if any changes in medications, doses, concerns, etc. Patient expresses understanding and has no further questions at this time.    Ileana Godinez [facility]

## 2025-04-18 NOTE — PROGRESS NOTES
I have supervised and reviewed the notes, assessments, and/or procedures performed. The documented assessment and plan were developed cooperatively. I concur with the documentation of this patient encounter.    Alexander Valiente, PharmD

## 2025-04-21 ENCOUNTER — ANTICOAGULATION VISIT (OUTPATIENT)
Dept: PHARMACY | Facility: HOSPITAL | Age: OVER 89
End: 2025-04-21
Payer: MEDICARE

## 2025-04-21 DIAGNOSIS — I48.0 PAROXYSMAL ATRIAL FIBRILLATION: Primary | ICD-10-CM

## 2025-04-21 LAB — INR PPP: 3.6

## 2025-04-21 NOTE — PROGRESS NOTES
Anticoagulation Clinic Progress Note    Anticoagulation Summary  As of 4/21/2025      INR goal:  2.0-3.0   TTR:  63.9% (6.3 y)   INR used for dosing:  3.60 (4/21/2025)   Warfarin maintenance plan:  2 mg every day   Weekly warfarin total:  14 mg   Plan last modified:  Alexander Valiente, PharmD (4/11/2025)   Next INR check:  4/25/2025   Priority:  Maintenance   Target end date:  Indefinite    Indications    Paroxysmal atrial fibrillation [I48.0]                 Anticoagulation Episode Summary       INR check location:  --    Preferred lab:  --    Send INR reminders to:   JACEY SINCLAIR  POOL    Comments:  [facility] beginning 4/22/20          Anticoagulation Care Providers       Provider Role Specialty Phone number    Jonah Regalado Jr., MD Referring Cardiology [phone]            Clinic Interview:  Patient Findings     Negatives:  Signs/symptoms of thrombosis, Signs/symptoms of bleeding,   Laboratory test error suspected, Change in health, Change in alcohol use,   Change in activity, Upcoming invasive procedure, Emergency department   visit, Upcoming dental procedure, Missed doses, Extra doses, Change in   medications, Change in diet/appetite, Hospital admission, Bruising, Other   complaints    Comments:  Patient has 3 days remaining of prednisone and finished Zpak   today.      Clinical Outcomes     Negatives:  Major bleeding event, Thromboembolic event,   Anticoagulation-related hospital admission, Anticoagulation-related ED   visit, Anticoagulation-related fatality    Comments:  Patient has 3 days remaining of prednisone and finished Zpak   today.        INR History:      4/11/2025     1:32 PM 4/14/2025    12:00 AM 4/14/2025     1:48 PM 4/18/2025    12:00 AM 4/18/2025     2:29 PM 4/21/2025    12:00 AM 4/21/2025    11:21 AM   Anticoagulation Monitoring   INR 3.90  2.90  3.40  3.60   INR Date 4/11/2025 4/14/2025 4/18/2025 4/21/2025   INR Goal 2.0-3.0  2.0-3.0  2.0-3.0  2.0-3.0   Trend Down  Same   Same  Same   Last Week Total 14 mg  12 mg  14 mg  12 mg   Next Week Total 14 mg  14 mg  12 mg  10 mg   Sun 2 mg  -  2 mg  -   Mon -  2 mg  -  Hold (4/21)   Tue -  2 mg  -  2 mg   Wed -  2 mg  -  Hold (4/23)   Thu -  2 mg  -  2 mg   Fri 2 mg  -  Hold (4/18)  -   Sat 2 mg  -  2 mg  -   Historical INR  2.90      3.40      3.60        Visit Report Report             This result is from an external source.       Plan:  1. INR is Supratherapeutic today- see above in Anticoagulation Summary.   Will instruct Caitlyn Longoria to Change their warfarin regimen- see above in Anticoagulation Summary.  2. Follow up in 4 days  3. They have been instructed to call if any changes in medications, doses, concerns, etc. Patient expresses understanding and has no further questions at this time.    Ileana Godinez [facility]

## 2025-04-25 ENCOUNTER — APPOINTMENT (OUTPATIENT)
Dept: GENERAL RADIOLOGY | Facility: HOSPITAL | Age: OVER 89
DRG: 638 | End: 2025-04-25
Payer: MEDICARE

## 2025-04-25 ENCOUNTER — TELEPHONE (OUTPATIENT)
Dept: PHARMACY | Facility: HOSPITAL | Age: OVER 89
End: 2025-04-25
Payer: MEDICARE

## 2025-04-25 ENCOUNTER — HOSPITAL ENCOUNTER (INPATIENT)
Facility: HOSPITAL | Age: OVER 89
LOS: 10 days | Discharge: NURSING, REHAB OR LONG-TERM CARE | DRG: 638 | End: 2025-05-05
Attending: EMERGENCY MEDICINE | Admitting: INTERNAL MEDICINE
Payer: MEDICARE

## 2025-04-25 DIAGNOSIS — R60.0 PEDAL EDEMA: ICD-10-CM

## 2025-04-25 DIAGNOSIS — L03.115 CELLULITIS OF RIGHT LOWER EXTREMITY: ICD-10-CM

## 2025-04-25 DIAGNOSIS — E87.6 HYPOKALEMIA: ICD-10-CM

## 2025-04-25 DIAGNOSIS — A41.9 SEPSIS WITHOUT ACUTE ORGAN DYSFUNCTION, DUE TO UNSPECIFIED ORGANISM: Primary | ICD-10-CM

## 2025-04-25 DIAGNOSIS — D72.829 LEUKOCYTOSIS, UNSPECIFIED TYPE: ICD-10-CM

## 2025-04-25 LAB
ALBUMIN SERPL-MCNC: 3.3 G/DL (ref 3.5–5.2)
ALBUMIN/GLOB SERPL: 1.2 G/DL
ALP SERPL-CCNC: 109 U/L (ref 39–117)
ALT SERPL W P-5'-P-CCNC: 45 U/L (ref 1–33)
ANION GAP SERPL CALCULATED.3IONS-SCNC: 11 MMOL/L (ref 5–15)
APTT PPP: 60.5 SECONDS (ref 22.7–35.4)
AST SERPL-CCNC: 23 U/L (ref 1–32)
BASOPHILS # BLD MANUAL: 0 10*3/MM3 (ref 0–0.2)
BASOPHILS NFR BLD MANUAL: 0 % (ref 0–1.5)
BILIRUB SERPL-MCNC: 1.3 MG/DL (ref 0–1.2)
BUN SERPL-MCNC: 22 MG/DL (ref 8–23)
BUN/CREAT SERPL: 24.7 (ref 7–25)
CALCIUM SPEC-SCNC: 8.9 MG/DL (ref 8.2–9.6)
CHLORIDE SERPL-SCNC: 101 MMOL/L (ref 98–107)
CO2 SERPL-SCNC: 28 MMOL/L (ref 22–29)
CREAT SERPL-MCNC: 0.89 MG/DL (ref 0.57–1)
D-LACTATE SERPL-SCNC: 1.2 MMOL/L (ref 0.5–2)
D-LACTATE SERPL-SCNC: 2.1 MMOL/L (ref 0.5–2)
DEPRECATED RDW RBC AUTO: 47.2 FL (ref 37–54)
EGFRCR SERPLBLD CKD-EPI 2021: 61.7 ML/MIN/1.73
EOSINOPHIL # BLD MANUAL: 0 10*3/MM3 (ref 0–0.4)
EOSINOPHIL NFR BLD MANUAL: 0 % (ref 0.3–6.2)
ERYTHROCYTE [DISTWIDTH] IN BLOOD BY AUTOMATED COUNT: 13.4 % (ref 12.3–15.4)
GEN 5 1HR TROPONIN T REFLEX: 40 NG/L
GLOBULIN UR ELPH-MCNC: 2.7 GM/DL
GLUCOSE SERPL-MCNC: 131 MG/DL (ref 65–99)
HCT VFR BLD AUTO: 36.8 % (ref 34–46.6)
HGB BLD-MCNC: 11.9 G/DL (ref 12–15.9)
INR PPP: 2.08 (ref 0.9–1.1)
LYMPHOCYTES # BLD MANUAL: 9.56 10*3/MM3 (ref 0.7–3.1)
LYMPHOCYTES NFR BLD MANUAL: 3 % (ref 5–12)
MCH RBC QN AUTO: 31 PG (ref 26.6–33)
MCHC RBC AUTO-ENTMCNC: 32.3 G/DL (ref 31.5–35.7)
MCV RBC AUTO: 95.8 FL (ref 79–97)
MONOCYTES # BLD: 0.75 10*3/MM3 (ref 0.1–0.9)
NEUTROPHILS # BLD AUTO: 14.84 10*3/MM3 (ref 1.7–7)
NEUTROPHILS NFR BLD MANUAL: 59 % (ref 42.7–76)
NT-PROBNP SERPL-MCNC: 910 PG/ML (ref 0–1800)
OVALOCYTES BLD QL SMEAR: ABNORMAL
PLAT MORPH BLD: NORMAL
PLATELET # BLD AUTO: 127 10*3/MM3 (ref 140–450)
PMV BLD AUTO: 10.1 FL (ref 6–12)
POIKILOCYTOSIS BLD QL SMEAR: ABNORMAL
POTASSIUM SERPL-SCNC: 2.7 MMOL/L (ref 3.5–5.2)
PROCALCITONIN SERPL-MCNC: 0.27 NG/ML (ref 0–0.25)
PROT SERPL-MCNC: 6 G/DL (ref 6–8.5)
PROTHROMBIN TIME: 23.6 SECONDS (ref 11.7–14.2)
QT INTERVAL: 415 MS
QTC INTERVAL: 479 MS
RBC # BLD AUTO: 3.84 10*6/MM3 (ref 3.77–5.28)
SODIUM SERPL-SCNC: 140 MMOL/L (ref 136–145)
TROPONIN T % DELTA: -18
TROPONIN T NUMERIC DELTA: -9 NG/L
TROPONIN T SERPL HS-MCNC: 49 NG/L
VARIANT LYMPHS NFR BLD MANUAL: 38 % (ref 19.6–45.3)
WBC MORPH BLD: NORMAL
WBC NRBC COR # BLD AUTO: 25.16 10*3/MM3 (ref 3.4–10.8)

## 2025-04-25 PROCEDURE — 84145 PROCALCITONIN (PCT): CPT | Performed by: EMERGENCY MEDICINE

## 2025-04-25 PROCEDURE — 83880 ASSAY OF NATRIURETIC PEPTIDE: CPT | Performed by: EMERGENCY MEDICINE

## 2025-04-25 PROCEDURE — 25010000002 VANCOMYCIN 10 G RECONSTITUTED SOLUTION: Performed by: EMERGENCY MEDICINE

## 2025-04-25 PROCEDURE — 36415 COLL VENOUS BLD VENIPUNCTURE: CPT

## 2025-04-25 PROCEDURE — 93005 ELECTROCARDIOGRAM TRACING: CPT | Performed by: EMERGENCY MEDICINE

## 2025-04-25 PROCEDURE — 87040 BLOOD CULTURE FOR BACTERIA: CPT | Performed by: EMERGENCY MEDICINE

## 2025-04-25 PROCEDURE — 83605 ASSAY OF LACTIC ACID: CPT | Performed by: EMERGENCY MEDICINE

## 2025-04-25 PROCEDURE — 94799 UNLISTED PULMONARY SVC/PX: CPT

## 2025-04-25 PROCEDURE — 25010000002 PIPERACILLIN SOD-TAZOBACTAM PER 1 G: Performed by: EMERGENCY MEDICINE

## 2025-04-25 PROCEDURE — 25810000003 SODIUM CHLORIDE 0.9 % SOLUTION: Performed by: EMERGENCY MEDICINE

## 2025-04-25 PROCEDURE — 94640 AIRWAY INHALATION TREATMENT: CPT

## 2025-04-25 PROCEDURE — 85610 PROTHROMBIN TIME: CPT | Performed by: EMERGENCY MEDICINE

## 2025-04-25 PROCEDURE — 25810000003 SODIUM CHLORIDE 0.9 % SOLUTION: Performed by: INTERNAL MEDICINE

## 2025-04-25 PROCEDURE — 93010 ELECTROCARDIOGRAM REPORT: CPT | Performed by: INTERNAL MEDICINE

## 2025-04-25 PROCEDURE — 99291 CRITICAL CARE FIRST HOUR: CPT

## 2025-04-25 PROCEDURE — 85025 COMPLETE CBC W/AUTO DIFF WBC: CPT | Performed by: EMERGENCY MEDICINE

## 2025-04-25 PROCEDURE — 85007 BL SMEAR W/DIFF WBC COUNT: CPT | Performed by: EMERGENCY MEDICINE

## 2025-04-25 PROCEDURE — 80053 COMPREHEN METABOLIC PANEL: CPT | Performed by: EMERGENCY MEDICINE

## 2025-04-25 PROCEDURE — 85730 THROMBOPLASTIN TIME PARTIAL: CPT | Performed by: EMERGENCY MEDICINE

## 2025-04-25 PROCEDURE — 71045 X-RAY EXAM CHEST 1 VIEW: CPT

## 2025-04-25 PROCEDURE — 84484 ASSAY OF TROPONIN QUANT: CPT | Performed by: EMERGENCY MEDICINE

## 2025-04-25 PROCEDURE — 25010000002 PIPERACILLIN SOD-TAZOBACTAM PER 1 G: Performed by: INTERNAL MEDICINE

## 2025-04-25 RX ORDER — WARFARIN SODIUM 4 MG/1
4 TABLET ORAL
Status: DISCONTINUED | OUTPATIENT
Start: 2025-04-25 | End: 2025-04-25 | Stop reason: DRUGHIGH

## 2025-04-25 RX ORDER — CHOLECALCIFEROL (VITAMIN D3) 25 MCG
1000 TABLET ORAL DAILY
Status: DISCONTINUED | OUTPATIENT
Start: 2025-04-25 | End: 2025-05-05 | Stop reason: HOSPADM

## 2025-04-25 RX ORDER — ALBUTEROL SULFATE 0.83 MG/ML
2.5 SOLUTION RESPIRATORY (INHALATION) EVERY 6 HOURS PRN
Status: DISCONTINUED | OUTPATIENT
Start: 2025-04-25 | End: 2025-05-05 | Stop reason: HOSPADM

## 2025-04-25 RX ORDER — ONDANSETRON 4 MG/1
4 TABLET, ORALLY DISINTEGRATING ORAL EVERY 6 HOURS PRN
Status: DISCONTINUED | OUTPATIENT
Start: 2025-04-25 | End: 2025-05-05 | Stop reason: HOSPADM

## 2025-04-25 RX ORDER — MONTELUKAST SODIUM 10 MG/1
10 TABLET ORAL NIGHTLY
Status: DISCONTINUED | OUTPATIENT
Start: 2025-04-25 | End: 2025-05-05 | Stop reason: HOSPADM

## 2025-04-25 RX ORDER — ALBUTEROL SULFATE 90 UG/1
2 INHALANT RESPIRATORY (INHALATION) EVERY 4 HOURS PRN
COMMUNITY

## 2025-04-25 RX ORDER — OXYBUTYNIN CHLORIDE 10 MG/1
10 TABLET, EXTENDED RELEASE ORAL 2 TIMES DAILY
Status: DISCONTINUED | OUTPATIENT
Start: 2025-04-25 | End: 2025-05-05 | Stop reason: HOSPADM

## 2025-04-25 RX ORDER — SODIUM CHLORIDE FOR INHALATION 7 %
4 VIAL, NEBULIZER (ML) INHALATION 2 TIMES DAILY
COMMUNITY

## 2025-04-25 RX ORDER — POTASSIUM CHLORIDE 1500 MG/1
40 TABLET, EXTENDED RELEASE ORAL ONCE
Status: COMPLETED | OUTPATIENT
Start: 2025-04-25 | End: 2025-04-25

## 2025-04-25 RX ORDER — BISACODYL 10 MG
10 SUPPOSITORY, RECTAL RECTAL DAILY PRN
Status: DISCONTINUED | OUTPATIENT
Start: 2025-04-25 | End: 2025-05-05 | Stop reason: HOSPADM

## 2025-04-25 RX ORDER — ACETAMINOPHEN 160 MG/5ML
650 SOLUTION ORAL EVERY 4 HOURS PRN
Status: DISCONTINUED | OUTPATIENT
Start: 2025-04-25 | End: 2025-05-05 | Stop reason: HOSPADM

## 2025-04-25 RX ORDER — NYSTATIN AND TRIAMCINOLONE ACETONIDE 100000; 1 [USP'U]/G; MG/G
1 CREAM TOPICAL 2 TIMES DAILY
COMMUNITY

## 2025-04-25 RX ORDER — SODIUM CHLORIDE 9 MG/ML
75 INJECTION, SOLUTION INTRAVENOUS CONTINUOUS
Status: ACTIVE | OUTPATIENT
Start: 2025-04-25 | End: 2025-04-26

## 2025-04-25 RX ORDER — ACETAMINOPHEN 325 MG/1
650 TABLET ORAL EVERY 4 HOURS PRN
Status: DISCONTINUED | OUTPATIENT
Start: 2025-04-25 | End: 2025-05-05 | Stop reason: HOSPADM

## 2025-04-25 RX ORDER — ALBUTEROL SULFATE 0.83 MG/ML
2.5 SOLUTION RESPIRATORY (INHALATION) EVERY 6 HOURS PRN
Status: DISCONTINUED | OUTPATIENT
Start: 2025-04-25 | End: 2025-04-25 | Stop reason: SDUPTHER

## 2025-04-25 RX ORDER — WARFARIN SODIUM 2 MG/1
2 TABLET ORAL
Status: DISCONTINUED | OUTPATIENT
Start: 2025-04-25 | End: 2025-04-26

## 2025-04-25 RX ORDER — NYSTATIN 100000 U/G
1 CREAM TOPICAL 2 TIMES DAILY
COMMUNITY

## 2025-04-25 RX ORDER — IPRATROPIUM BROMIDE AND ALBUTEROL SULFATE 2.5; .5 MG/3ML; MG/3ML
3 SOLUTION RESPIRATORY (INHALATION)
Status: DISCONTINUED | OUTPATIENT
Start: 2025-04-25 | End: 2025-05-05 | Stop reason: HOSPADM

## 2025-04-25 RX ORDER — BISACODYL 5 MG/1
5 TABLET, DELAYED RELEASE ORAL DAILY PRN
Status: DISCONTINUED | OUTPATIENT
Start: 2025-04-25 | End: 2025-05-05 | Stop reason: HOSPADM

## 2025-04-25 RX ORDER — ACETAMINOPHEN 650 MG/1
650 SUPPOSITORY RECTAL EVERY 4 HOURS PRN
Status: DISCONTINUED | OUTPATIENT
Start: 2025-04-25 | End: 2025-05-05 | Stop reason: HOSPADM

## 2025-04-25 RX ORDER — POLYETHYLENE GLYCOL 3350 17 G/17G
17 POWDER, FOR SOLUTION ORAL DAILY PRN
Status: DISCONTINUED | OUTPATIENT
Start: 2025-04-25 | End: 2025-05-05 | Stop reason: HOSPADM

## 2025-04-25 RX ORDER — SODIUM CHLORIDE FOR INHALATION 7 %
4 VIAL, NEBULIZER (ML) INHALATION
Status: DISCONTINUED | OUTPATIENT
Start: 2025-04-25 | End: 2025-05-05 | Stop reason: HOSPADM

## 2025-04-25 RX ORDER — BUDESONIDE 0.5 MG/2ML
0.5 INHALANT ORAL
Status: DISCONTINUED | OUTPATIENT
Start: 2025-04-25 | End: 2025-05-05 | Stop reason: HOSPADM

## 2025-04-25 RX ORDER — ROSUVASTATIN CALCIUM 10 MG/1
20 TABLET, COATED ORAL NIGHTLY
Status: DISCONTINUED | OUTPATIENT
Start: 2025-04-25 | End: 2025-05-05 | Stop reason: HOSPADM

## 2025-04-25 RX ORDER — ONDANSETRON 2 MG/ML
4 INJECTION INTRAMUSCULAR; INTRAVENOUS EVERY 6 HOURS PRN
Status: DISCONTINUED | OUTPATIENT
Start: 2025-04-25 | End: 2025-05-05 | Stop reason: HOSPADM

## 2025-04-25 RX ORDER — SODIUM CHLORIDE 0.9 % (FLUSH) 0.9 %
10 SYRINGE (ML) INJECTION AS NEEDED
Status: DISCONTINUED | OUTPATIENT
Start: 2025-04-25 | End: 2025-05-05 | Stop reason: HOSPADM

## 2025-04-25 RX ORDER — AMOXICILLIN 250 MG
2 CAPSULE ORAL 2 TIMES DAILY PRN
Status: DISCONTINUED | OUTPATIENT
Start: 2025-04-25 | End: 2025-05-05 | Stop reason: HOSPADM

## 2025-04-25 RX ORDER — VANCOMYCIN/0.9 % SOD CHLORIDE 1.5G/250ML
20 PLASTIC BAG, INJECTION (ML) INTRAVENOUS ONCE
Status: COMPLETED | OUTPATIENT
Start: 2025-04-25 | End: 2025-04-25

## 2025-04-25 RX ADMIN — PIPERACILLIN AND TAZOBACTAM 3.38 G: 3; .375 INJECTION, POWDER, FOR SOLUTION INTRAVENOUS at 22:00

## 2025-04-25 RX ADMIN — SODIUM CHLORIDE 75 ML/HR: 9 INJECTION, SOLUTION INTRAVENOUS at 22:00

## 2025-04-25 RX ADMIN — SODIUM CHLORIDE 1000 ML: 9 INJECTION, SOLUTION INTRAVENOUS at 12:40

## 2025-04-25 RX ADMIN — ROSUVASTATIN CALCIUM 20 MG: 10 TABLET, FILM COATED ORAL at 22:12

## 2025-04-25 RX ADMIN — VANCOMYCIN HYDROCHLORIDE 1500 MG: 10 INJECTION, POWDER, LYOPHILIZED, FOR SOLUTION INTRAVENOUS at 15:01

## 2025-04-25 RX ADMIN — ANTI-FUNGAL POWDER MICONAZOLE NITRATE TALC FREE 1 APPLICATION: 1.42 POWDER TOPICAL at 21:19

## 2025-04-25 RX ADMIN — OXYBUTYNIN CHLORIDE 10 MG: 10 TABLET, EXTENDED RELEASE ORAL at 22:12

## 2025-04-25 RX ADMIN — PIPERACILLIN AND TAZOBACTAM 3.38 G: 3; .375 INJECTION, POWDER, FOR SOLUTION INTRAVENOUS at 14:14

## 2025-04-25 RX ADMIN — CALCIUM CARBONATE-VITAMIN D TAB 500 MG-200 UNIT 1 TABLET: 500-200 TAB at 22:13

## 2025-04-25 RX ADMIN — MONTELUKAST 10 MG: 10 TABLET, FILM COATED ORAL at 22:12

## 2025-04-25 RX ADMIN — POTASSIUM CHLORIDE 40 MEQ: 1500 TABLET, EXTENDED RELEASE ORAL at 14:10

## 2025-04-25 RX ADMIN — Medication 1000 UNITS: at 22:12

## 2025-04-25 RX ADMIN — IPRATROPIUM BROMIDE AND ALBUTEROL SULFATE 3 ML: .5; 3 SOLUTION RESPIRATORY (INHALATION) at 21:55

## 2025-04-25 RX ADMIN — BUDESONIDE 0.5 MG: 0.5 INHALANT RESPIRATORY (INHALATION) at 21:55

## 2025-04-25 RX ADMIN — Medication 4 ML: at 21:55

## 2025-04-25 NOTE — TELEPHONE ENCOUNTER
----- Message from Yumiko REBOLLAR sent at 4/25/2025 10:38 AM EDT -----  Maria Alejandra from [facility] called and reported she was unable to do patients INR today, patient being transported by ambulance to [facility].

## 2025-04-25 NOTE — PROGRESS NOTES
[facility] Clinical Pharmacy Services: Vancomycin Pharmacokinetic Initial Consult Note    Caitlyn Longoria is a 89 y.o. female who is on day 1 of pharmacy to dose vancomycin.    Indication: Empiric  Consulting Provider: Dr. Garcia  Planned Duration of Therapy: 5 days  Loading Dose Ordered or Given: 1500 mg on 4/25 at 1500  MRSA PCR performed: N/A  Culture/Source: 4/25 blood cx in process  Target: -600 mg/L.hr   Pertinent Vanc Dosing History:   8/26/24-9/3/24: vanc 1500 mg x1 -> 1000 mg q24h -> 1250 mg q24h  - weight 80.5 kg, CrCl 38-62 mL/min  Other Antimicrobials: piperacillin-tazobactam 3.375 mg q8h     Vitals/Labs  Ht: 160 cm; Wt: 78.2 kg (172 lb 8 oz)  Temp Readings from Last 1 Encounters:   04/25/25 98.4 °F (36.9 °C)    Estimated Creatinine Clearance: 41.6 mL/min (by C-G formula based on SCr of 0.89 mg/dL).       Results from last 7 days   Lab Units 04/25/25  1237   CREATININE mg/dL 0.89   WBC 10*3/mm3 25.16*     Assessment/Plan:    Vancomycin Dose: 1000 mg IV every 24 hours  Predictive AUC level for the dose ordered is 477 mg/L.hr, which is within the target of 400-600 mg/L.hr  Vanc Trough has been ordered for 4/28 at 1300     Pharmacy will follow patient's kidney function and will adjust doses and obtain levels as necessary. Thank you for involving pharmacy in this patient's care. Please contact pharmacy with any questions or concerns.                           Gosia Gannon, [facility]  Clinical Pharmacist

## 2025-04-25 NOTE — ED NOTES
..Nursing report ED to floor  Caitlyn Longoria  89 y.o.  female    HPI :  HPI  Stated Reason for Visit: edema    Chief Complaint  Chief Complaint   Patient presents with    Edema       Admitting doctor:   Jojo Garcia MD    Admitting diagnosis:   The primary encounter diagnosis was Sepsis without acute organ dysfunction, due to unspecified organism. Diagnoses of Cellulitis of right lower extremity, Leukocytosis, unspecified type, Hypokalemia, and Pedal edema were also pertinent to this visit.    Code status:   Current Code Status       Date Active Code Status Order ID Comments User Context       Prior            Allergies:   Accupril [quinapril hcl], Ahist [chlorpheniramine], Clarithromycin, Esomeprazole, Latex, Levalbuterol, Levocetirizine, Lipitor [atorvastatin], Omeprazole, Pravachol [pravastatin], Sulindac, Valdecoxib, Chlorcyclizine, Chlorpheniramine-phenylephrine, Diclofenac sodium, Diphenhydramine, Lodine [etodolac], and Sulfa antibiotics    Isolation:   No active isolations    Intake and Output    Intake/Output Summary (Last 24 hours) at 4/25/2025 1527  Last data filed at 4/25/2025 1500  Gross per 24 hour   Intake 1100 ml   Output --   Net 1100 ml       Weight:       04/25/25  1357   Weight: 78.2 kg (172 lb 8 oz)       Most recent vitals:   Vitals:    04/25/25 1230 04/25/25 1300 04/25/25 1357 04/25/25 1430   BP: 92/55 110/63  111/64   BP Location:       Pulse:  88  80   Resp:       Temp:       SpO2:  100%  99%   Weight:   78.2 kg (172 lb 8 oz)        Active LDAs/IV Access:   Lines, Drains & Airways       Active LDAs       Name Placement date Placement time Site Days    Peripheral IV 04/25/25 1238 22 G Anterior;Left Forearm 04/25/25  1238  Forearm  less than 1                    Labs (abnormal labs have a star):   Labs Reviewed   COMPREHENSIVE METABOLIC PANEL - Abnormal; Notable for the following components:       Result Value    Glucose 131 (*)     Potassium 2.7 (*)     Albumin 3.3 (*)     ALT  (SGPT) 45 (*)     Total Bilirubin 1.3 (*)     All other components within normal limits    Narrative:     GFR Categories in Chronic Kidney Disease (CKD)      GFR Category          GFR (mL/min/1.73)    Interpretation  G1                     90 or greater         Normal or high (1)  G2                      60-89                Mild decrease (1)  G3a                   45-59                Mild to moderate decrease  G3b                   30-44                Moderate to severe decrease  G4                    15-29                Severe decrease  G5                    14 or less           Kidney failure          (1)In the absence of evidence of kidney disease, neither GFR category G1 or G2 fulfill the criteria for CKD.    eGFR calculation 2021 CKD-EPI creatinine equation, which does not include race as a factor   PROTIME-INR - Abnormal; Notable for the following components:    Protime 23.6 (*)     INR 2.08 (*)     All other components within normal limits   APTT - Abnormal; Notable for the following components:    PTT 60.5 (*)     All other components within normal limits   TROPONIN - Abnormal; Notable for the following components:    HS Troponin T 49 (*)     All other components within normal limits    Narrative:     High Sensitive Troponin T Reference Range:  <14.0 ng/L- Negative Female for AMI  <22.0 ng/L- Negative Male for AMI  >=14 - Abnormal Female indicating possible myocardial injury.  >=22 - Abnormal Male indicating possible myocardial injury.   Clinicians would have to utilize clinical acumen, EKG, Troponin, and serial changes to determine if it is an Acute Myocardial Infarction or myocardial injury due to an underlying chronic condition.        LACTIC ACID, PLASMA - Abnormal; Notable for the following components:    Lactate 2.1 (*)     All other components within normal limits   PROCALCITONIN - Abnormal; Notable for the following components:    Procalcitonin 0.27 (*)     All other components within normal  "limits    Narrative:     As a Marker for Sepsis (Non-Neonates):    1. <0.5 ng/mL represents a low risk of severe sepsis and/or septic shock.  2. >2 ng/mL represents a high risk of severe sepsis and/or septic shock.    As a Marker for Lower Respiratory Tract Infections that require antibiotic therapy:    PCT on Admission    Antibiotic Therapy       6-12 Hrs later    >0.5                Strongly Recommended  >0.25 - <0.5        Recommended   0.1 - 0.25          Discouraged              Remeasure/reassess PCT  <0.1                Strongly Discouraged     Remeasure/reassess PCT    As 28 day mortality risk marker: \"Change in Procalcitonin Result\" (>80% or <=80%) if Day 0 (or Day 1) and Day 4 values are available. Refer to [email or web address]    Change in PCT <=80%  A decrease of PCT levels below or equal to 80% defines a positive change in PCT test result representing a higher risk for 28-day all-cause mortality of patients diagnosed with severe sepsis for septic shock.    Change in PCT >80%  A decrease of PCT levels of more than 80% defines a negative change in PCT result representing a lower risk for 28-day all-cause mortality of patients diagnosed with severe sepsis or septic shock.      CBC WITH AUTO DIFFERENTIAL - Abnormal; Notable for the following components:    WBC 25.16 (*)     Hemoglobin 11.9 (*)     Platelets 127 (*)     All other components within normal limits   MANUAL DIFFERENTIAL - Abnormal; Notable for the following components:    Monocyte % 3.0 (*)     Eosinophil % 0.0 (*)     Neutrophils Absolute 14.84 (*)     Lymphocytes Absolute 9.56 (*)     All other components within normal limits   HIGH SENSITIVITIY TROPONIN T 1HR - Abnormal; Notable for the following components:    HS Troponin T 40 (*)     All other components within normal limits    Narrative:     High Sensitive Troponin T Reference Range:  <14.0 ng/L- Negative Female for AMI  <22.0 ng/L- Negative Male for AMI  >=14 - Abnormal " Female indicating possible myocardial injury.  >=22 - Abnormal Male indicating possible myocardial injury.   Clinicians would have to utilize clinical acumen, EKG, Troponin, and serial changes to determine if it is an Acute Myocardial Infarction or myocardial injury due to an underlying chronic condition.        BNP (IN-HOUSE) - Normal    Narrative:     This assay is used as an aid in the diagnosis of individuals suspected of having heart failure. It can be used as an aid in the diagnosis of acute decompensated heart failure (ADHF) in patients presenting with signs and symptoms of ADHF to the emergency department (ED). In addition, NT-proBNP of <300 pg/mL indicates ADHF is not likely.    Age Range Result Interpretation  NT-proBNP Concentration (pg/mL:      <50             Positive            >450                   Gray                 300-450                    Negative             <300    50-75           Positive            >900                  Gray                300-900                  Negative            <300      >75             Positive            >1800                  Gray                300-1800                  Negative            <300   BLOOD CULTURE   BLOOD CULTURE   LACTIC ACID, REFLEX   CBC AND DIFFERENTIAL    Narrative:     The following orders were created for panel order CBC & Differential.  Procedure                               Abnormality         Status                     ---------                               -----------         ------                     CBC Auto Differential[[id number]]        Abnormal            Final result                 Please view results for these tests on the individual orders.       EKG:   ECG 12 Lead Other; hypotension/leg swelling   Final Result   HEART RATE=80  bpm   RR Phctpwak=257  ms   MT Interval=  ms   P Horizontal Axis=  deg   P Front Axis=  deg   QRSD Hdxesyrg=715  ms   QT Geomzsyj=513  ms   DJaC=416  ms   QRS Axis=-78  deg   T Wave Axis=107  deg   -  ABNORMAL ECG -   Afib/flutter and ventricular-paced rhythm   No further analysis attempted due to paced rhythm   No change from prior tracing   Electronically Signed By: Jaylin Garcia ([facility]) 2025-04-25 13:56:35   Date and Time of Study:2025-04-25 12:54:01          Meds given in ED:   Medications   sodium chloride 0.9 % flush 10 mL (has no administration in time range)   vancomycin IVPB 1500 mg in 0.9% NaCl (Premix) 500 mL (1,500 mg Intravenous New Bag 4/25/25 1501)   sodium chloride 0.9 % bolus 1,000 mL (0 mL Intravenous Stopped 4/25/25 1500)   piperacillin-tazobactam (ZOSYN) 3.375 g IVPB in 100 mL NS MBP (CD) (0 g Intravenous Stopped 4/25/25 1455)   potassium chloride (KLOR-CON M20) CR tablet 40 mEq (40 mEq Oral Given 4/25/25 1410)       Imaging results:  XR Chest 1 View  Result Date: 4/25/2025  No focal pulmonary consolidation. Cardiomegaly. Follow-up as clinical indications persist.  This report was finalized on 4/25/2025 1:21 PM by Dr. Mitul Hudson M.D on Workstation: [email or web address]        Ambulatory status:   - up with assistance    Social issues:   Social History     Socioeconomic History    Marital status: Single   Tobacco Use    Smoking status: Never     Passive exposure: Never    Smokeless tobacco: Never    Tobacco comments:     caffeine use- soda   Vaping Use    Vaping status: Never Used   Substance and Sexual Activity    Alcohol use: Never    Drug use: No    Sexual activity: Defer       Peripheral Neurovascular  Peripheral Neurovascular (Adult)  Peripheral Neurovascular WDL: .WDL except, neurovascular assessment lower  Additional Documentation: Edema (Group)  Edema  Edema: foot, right, foot, left, ankle, right, ankle, left, leg, right, leg, left  Leg, Left Edema: 3+ (Moderate)  Leg, Right Edema: 4+ (Severe)  Ankle, Left Edema: 3+ (Moderate)  Ankle, Right Edema: 4+ (Severe)  Foot, Left Edema: 3+ (Moderate)  Foot, Right Edema: 4+ (Severe)  LLE Neurovascular Assessment  Temperature LLE: warm  Color LLE:  blotchy, red  Sensation LLE: tenderness present  RLE Neurovascular Assessment  Temperature RLE: warm  Color RLE: blotchy  Sensation RLE: tenderness present, tingling present    Neuro Cognitive  Neuro Cognitive (Adult)  Cognitive/Neuro/Behavioral WDL: WDL    Learning  Learning Assessment  Learning Readiness and Ability: no barriers identified    Respiratory  Respiratory  Airway WDL: WDL  Respiratory WDL  Respiratory WDL: .WDL except, rhythm/pattern  Rhythm/Pattern, Respiratory: shortness of breath    Abdominal Pain       Pain Assessments  Pain (Adult)  (0-10) Pain Rating: Rest: 0  (0-10) Pain Rating: Activity: 0    NIH Stroke Scale       Lauryn Hanks RN  04/25/25 15:27 EDT

## 2025-04-25 NOTE — ED PROVIDER NOTES
EMERGENCY DEPARTMENT ENCOUNTER    Room Number:  S01/01  PCP: Zenaida Suarez MD  Historian: Patient      HPI:  Chief Complaint: Leg swelling  A complete HPI/ROS/PMH/PSH/SH/FH are unobtainable due to: None  Context: Caitlyn Longoria is a 89 y.o. female who presents to the ED c/o sudden onset of lower extremity swelling that is somewhat chronic in nature however has significantly worsened over the last several days.  She states that she has known peripheral swelling as well as several wounds chronically.  She states that the right lower extremity is swollen and much more uncomfortable when compared to the left but swelling noted in both.  She currently denies chest pain, back pain, fever/chills, nausea/vomiting, or known sick contacts.  She reports that the symptoms are currently at least moderate if not severe in intensity.            PAST MEDICAL HISTORY  Active Ambulatory Problems     Diagnosis Date Noted    Neck and shoulder pain 03/24/2017    Arthropathy of shoulder region 03/24/2017    Carpal tunnel syndrome of left wrist 03/24/2017    Chronic pain of both shoulders 06/27/2017    Chronic left shoulder pain 12/05/2017    Arthropathy of left shoulder 12/05/2017    Dysarthria 12/07/2017    Type 2 diabetes mellitus 12/07/2017    Paroxysmal atrial fibrillation 12/07/2017    HLD (hyperlipidemia) 12/07/2017    Chronic bronchitis 12/07/2017    Coronary artery disease 12/07/2017    CVA (cerebral vascular accident) 12/10/2017    HTN (hypertension) 01/14/2018    CKD (chronic kidney disease), stage III 01/14/2018    Chronic combined systolic (congestive) and diastolic (congestive) heart failure 01/15/2018    Infection of prosthetic right knee joint 01/17/2018    Stasis dermatitis of right lower extremity due to peripheral venous hypertension 04/28/2018    Current use of long term anticoagulation 04/28/2018    Morbid obesity 02/08/2019    Acute respiratory failure with hypoxia 09/16/2019    Rhinovirus 02/11/2020     Asthma with acute exacerbation 02/11/2020    Acute respiratory failure with hypoxemia 02/12/2020    Viral pneumonia 04/23/2020    Hypoxia 08/29/2020    CLL (chronic lymphocytic leukemia) 08/31/2020    Dyspnea 09/29/2020    Anticoagulated on Coumadin     Osteoporotic compression fracture of spine 09/30/2020    Pneumonia due to gram-negative bacteria 10/02/2020    Pneumonia due to infectious organism 09/29/2020    Bilateral carotid artery disease 10/28/2020    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    Cellulitis of right lower extremity 04/14/2024    Hypokalemia 04/15/2024    Nocturnal hypoxia 04/15/2024    COPD (chronic obstructive pulmonary disease) 04/15/2024    Upper respiratory tract infection 08/25/2024    COPD (chronic obstructive pulmonary disease) 08/25/2024    Sick sinus syndrome 08/25/2024    Anemia 08/25/2024    Thrombocytopenia 08/25/2024    Chronic HFrEF (heart failure with reduced ejection fraction) 08/25/2024    Cellulitis of left lower leg 08/26/2024    Acute exacerbation of COPD with asthma 09/27/2024    Leukocytosis 09/27/2024    COPD exacerbation 09/27/2024     Resolved Ambulatory Problems     Diagnosis Date Noted    Hypernatremia 01/15/2018    Shortness of breath 04/28/2020    Shortness of breath 08/22/2024    Hypokalemia 08/25/2024    Hypomagnesemia 08/25/2024    Hypocalcemia 08/25/2024    COPD with acute exacerbation 02/25/2025    Coronavirus infection 02/25/2025     Past Medical History:   Diagnosis Date    Aortic calcification     Aortic regurgitation     Asthma     Atrial fibrillation     CAD (coronary artery disease)     Chronic combined systolic and diastolic congestive heart failure     CKD (chronic kidney disease) stage 3, GFR 30-59 ml/min     Coronary artery disease involving native coronary artery of native heart with angina pectoris     Disc degeneration, lumbar     Diverticulosis     DM type 2 (diabetes mellitus, type 2)     Dyslipidemia     GERD  (gastroesophageal reflux disease)     History of aneurysm     History of blood transfusion     History of fracture     History of heart attack     History of vitamin D deficiency     Hyperlipidemia     Hypertension     Leukemia     Mild mitral regurgitation     Mitral annular calcification     Osteopenia     PAF (paroxysmal atrial fibrillation)     Peripheral neuropathy     Skin cancer     Sleep apnea     SSS (sick sinus syndrome)     Stroke (cerebrum)     TIA (transient ischemic attack) 2017    Tricuspid regurgitation          PAST SURGICAL HISTORY  Past Surgical History:   Procedure Laterality Date    BRONCHOSCOPY Bilateral 10/6/2020    Procedure: BRONCHOSCOPY with BILATERAL LUNG washings;  Surgeon: Juno Coburn MD;  Location: [facility] ENDOSCOPY;  Service: Pulmonary;  Laterality: Bilateral;  PRE: purulent bronchitis  POST: PURULENT BRONCHITIS    BRONCHOSCOPY Bilateral 10/9/2020    Procedure: BRONCHOSCOPY with washing;  Surgeon: Juno Coburn MD;  Location: [facility] ENDOSCOPY;  Service: Pulmonary;  Laterality: Bilateral;  pre/post - mucous plug      CARDIAC CATHETERIZATION      CARDIAC ELECTROPHYSIOLOGY PROCEDURE N/A 2/7/2020    Procedure: PPM generator change - dual  medtronic;  Surgeon: Kiel Field MD;  Location: [facility] CATH INVASIVE LOCATION;  Service: Cardiology;  Laterality: N/A;    CHOLECYSTECTOMY      CORONARY STENT PLACEMENT      ENDOSCOPY N/A 9/14/2022    Procedure: ESOPHAGOGASTRODUODENOSCOPY with 54fr main dilatation;  Surgeon: Enio Cota MD;  Location: [facility] ENDOSCOPY;  Service: Gastroenterology;  Laterality: N/A;  pre - dysphagia  post - s/p dilatation, watermelon stomach    HERNIA REPAIR      hital hernia    HYSTERECTOMY      PACEMAKER IMPLANTATION      REPLACEMENT TOTAL KNEE Bilateral          FAMILY HISTORY  Family History   Problem Relation Age of Onset    Heart disease Mother     Hypertension Mother     Colon polyps Mother     Heart disease Father     Hypertension Father      Stroke Father     Hypertension Brother     Heart disease Brother     Diabetes Niece     Colon cancer Sister     Hypertension Sister     Hypertension Daughter     Hypertension Son     Hypertension Maternal Aunt     Hypertension Maternal Grandmother     Hypertension Maternal Grandfather     Hypertension Paternal Grandmother     Hypertension Paternal Grandfather          SOCIAL HISTORY  Social History     Socioeconomic History    Marital status: Single   Tobacco Use    Smoking status: Never     Passive exposure: Never    Smokeless tobacco: Never    Tobacco comments:     caffeine use- soda   Vaping Use    Vaping status: Never Used   Substance and Sexual Activity    Alcohol use: Never    Drug use: No    Sexual activity: Defer         ALLERGIES  Accupril [quinapril hcl], Ahist [chlorpheniramine], Clarithromycin, Esomeprazole, Latex, Levalbuterol, Levocetirizine, Lipitor [atorvastatin], Omeprazole, Pravachol [pravastatin], Sulindac, Valdecoxib, Chlorcyclizine, Chlorpheniramine-phenylephrine, Diclofenac sodium, Diphenhydramine, Lodine [etodolac], and Sulfa antibiotics        REVIEW OF SYSTEMS  Review of Systems   Constitutional:  Negative for fever.   HENT:  Negative for sore throat.    Eyes: Negative.    Respiratory:  Negative for cough and shortness of breath.    Cardiovascular:  Positive for leg swelling. Negative for chest pain.   Gastrointestinal:  Negative for abdominal pain, diarrhea and vomiting.   Genitourinary:  Negative for dysuria.   Musculoskeletal:  Negative for neck pain.        Right leg pain   Skin:  Positive for color change. Negative for rash.   Allergic/Immunologic: Negative.    Neurological:  Negative for weakness, numbness and headaches.   Hematological: Negative.    Psychiatric/Behavioral: Negative.     All other systems reviewed and are negative.         PHYSICAL EXAM  ED Triage Vitals [04/25/25 1121]   Temp Heart Rate Resp BP SpO2   98.4 °F (36.9 °C) 84 16 94/51 95 %      Temp src Heart Rate Source  Patient Position BP Location FiO2 (%)   -- -- -- Right arm --       Physical Exam  Constitutional:       General: She is not in acute distress.     Appearance: Normal appearance. She is not ill-appearing or toxic-appearing.   HENT:      Head: Normocephalic and atraumatic.   Eyes:      Extraocular Movements: Extraocular movements intact.      Pupils: Pupils are equal, round, and reactive to light.   Cardiovascular:      Rate and Rhythm: Normal rate and regular rhythm.      Heart sounds: No murmur heard.     No friction rub. No gallop.   Pulmonary:      Effort: Pulmonary effort is normal.      Breath sounds: Normal breath sounds.   Abdominal:      General: Abdomen is flat. There is no distension.      Palpations: Abdomen is soft.      Tenderness: There is no abdominal tenderness.   Musculoskeletal:         General: Swelling and tenderness present. Normal range of motion.      Cervical back: Normal range of motion and neck supple.      Right lower leg: Edema present.      Left lower leg: Edema present.      Comments: Tenderness with warm to the touch to the right lower extremity, compartments soft and pulses palpable   Skin:     General: Skin is warm and dry.      Findings: Erythema present.   Neurological:      General: No focal deficit present.      Mental Status: She is alert and oriented to person, place, and time.      Sensory: No sensory deficit.      Motor: No weakness.   Psychiatric:         Mood and Affect: Mood normal.         Behavior: Behavior normal.           Vital signs and nursing notes reviewed.          LAB RESULTS  Recent Results (from the past 24 hours)   Comprehensive Metabolic Panel    Collection Time: 04/25/25 12:37 PM    Specimen: Blood   Result Value Ref Range    Glucose 131 (H) 65 - 99 mg/dL    BUN 22 8 - 23 mg/dL    Creatinine 0.89 0.57 - 1.00 mg/dL    Sodium 140 136 - 145 mmol/L    Potassium 2.7 (L) 3.5 - 5.2 mmol/L    Chloride 101 98 - 107 mmol/L    CO2 28.0 22.0 - 29.0 mmol/L    Calcium  8.9 8.2 - 9.6 mg/dL    Total Protein 6.0 6.0 - 8.5 g/dL    Albumin 3.3 (L) 3.5 - 5.2 g/dL    ALT (SGPT) 45 (H) 1 - 33 U/L    AST (SGOT) 23 1 - 32 U/L    Alkaline Phosphatase 109 39 - 117 U/L    Total Bilirubin 1.3 (H) 0.0 - 1.2 mg/dL    Globulin 2.7 gm/dL    A/G Ratio 1.2 g/dL    BUN/Creatinine Ratio 24.7 7.0 - 25.0    Anion Gap 11.0 5.0 - 15.0 mmol/L    eGFR 61.7 >60.0 mL/min/1.73   Protime-INR    Collection Time: 04/25/25 12:37 PM    Specimen: Blood   Result Value Ref Range    Protime 23.6 (H) 11.7 - 14.2 Seconds    INR 2.08 (H) 0.90 - 1.10   aPTT    Collection Time: 04/25/25 12:37 PM    Specimen: Blood   Result Value Ref Range    PTT 60.5 (H) 22.7 - 35.4 seconds   High Sensitivity Troponin T    Collection Time: 04/25/25 12:37 PM    Specimen: Blood   Result Value Ref Range    HS Troponin T 49 (H) <14 ng/L   BNP    Collection Time: 04/25/25 12:37 PM    Specimen: Blood   Result Value Ref Range    proBNP 910.0 0.0 - 1,800.0 pg/mL   Lactic Acid, Plasma    Collection Time: 04/25/25 12:37 PM    Specimen: Blood   Result Value Ref Range    Lactate 2.1 (C) 0.5 - 2.0 mmol/L   Procalcitonin    Collection Time: 04/25/25 12:37 PM    Specimen: Blood   Result Value Ref Range    Procalcitonin 0.27 (H) 0.00 - 0.25 ng/mL   CBC Auto Differential    Collection Time: 04/25/25 12:37 PM    Specimen: Blood   Result Value Ref Range    WBC 25.16 (H) 3.40 - 10.80 10*3/mm3    RBC 3.84 3.77 - 5.28 10*6/mm3    Hemoglobin 11.9 (L) 12.0 - 15.9 g/dL    Hematocrit 36.8 34.0 - 46.6 %    MCV 95.8 79.0 - 97.0 fL    MCH 31.0 26.6 - 33.0 pg    MCHC 32.3 31.5 - 35.7 g/dL    RDW 13.4 12.3 - 15.4 %    RDW-SD 47.2 37.0 - 54.0 fl    MPV 10.1 6.0 - 12.0 fL    Platelets 127 (L) 140 - 450 10*3/mm3   Manual Differential    Collection Time: 04/25/25 12:37 PM    Specimen: Blood   Result Value Ref Range    Neutrophil % 59.0 42.7 - 76.0 %    Lymphocyte % 38.0 19.6 - 45.3 %    Monocyte % 3.0 (L) 5.0 - 12.0 %    Eosinophil % 0.0 (L) 0.3 - 6.2 %    Basophil % 0.0 0.0  - 1.5 %    Neutrophils Absolute 14.84 (H) 1.70 - 7.00 10*3/mm3    Lymphocytes Absolute 9.56 (H) 0.70 - 3.10 10*3/mm3    Monocytes Absolute 0.75 0.10 - 0.90 10*3/mm3    Eosinophils Absolute 0.00 0.00 - 0.40 10*3/mm3    Basophils Absolute 0.00 0.00 - 0.20 10*3/mm3    Ovalocytes Mod/2+ None Seen    Poikilocytes Mod/2+ None Seen    WBC Morphology Normal Normal    Platelet Morphology Normal Normal   ECG 12 Lead Other; hypotension/leg swelling    Collection Time: 04/25/25 12:54 PM   Result Value Ref Range    QT Interval 415 ms    QTC Interval 479 ms   High Sensitivity Troponin T 1Hr    Collection Time: 04/25/25  2:02 PM    Specimen: Blood   Result Value Ref Range    HS Troponin T 40 (H) <14 ng/L    Troponin T Numeric Delta -9 ng/L    Troponin T % Delta -18 Abnormal if >/= 20%       Ordered the above labs and reviewed the results.        RADIOLOGY  XR Chest 1 View  Result Date: 4/25/2025  XR CHEST 1 VW-  HISTORY: Female who is 89 years-old, bilateral lower extremity edema  TECHNIQUE: Frontal views of the chest  COMPARISON: 2/25/2025  FINDINGS: The heart is enlarged. Left-sided pacemaker, cardiac leads are seen. Pulmonary vasculature is unremarkable. Aorta is calcified. No focal pulmonary consolidation, pleural effusion, or pneumothorax. Left hemidiaphragm remains elevated. Gaseous distention of stomach is apparent. No acute osseous process.      No focal pulmonary consolidation. Cardiomegaly. Follow-up as clinical indications persist.  This report was finalized on 4/25/2025 1:21 PM by Dr. Mitul Hudson M.D on Workstation: [id number]        Ordered the above noted radiological studies. Reviewed by me in PACS.            PROCEDURES  Critical Care    Performed by: Noah Saunders MD  Authorized by: Noah Saunders MD    Critical care provider statement:     Critical care time (minutes):  35    Critical care time was exclusive of:  Separately billable procedures and treating other patients    Critical care was  necessary to treat or prevent imminent or life-threatening deterioration of the following conditions:  Sepsis and shock    Critical care was time spent personally by me on the following activities:  Blood draw for specimens, development of treatment plan with patient or surrogate, discussions with consultants, evaluation of patient's response to treatment, examination of patient, obtaining history from patient or surrogate, ordering and performing treatments and interventions, ordering and review of laboratory studies, ordering and review of radiographic studies, pulse oximetry, re-evaluation of patient's condition and review of old charts    Care discussed with: admitting provider        EKG independently interpreted by myself as follows:    EKG          EKG time: 1254  Rhythm/Rate: AV dual paced, 80  P waves and SD: absent  QRS, axis: widened, LAD   ST and T waves: nml     Interpreted Contemporaneously by me, independently viewed  unchanged compared to prior 2/25/25            MEDICATIONS GIVEN IN ER  Medications   sodium chloride 0.9 % flush 10 mL (has no administration in time range)   vancomycin IVPB 1500 mg in 0.9% NaCl (Premix) 500 mL (1,500 mg Intravenous New Bag 4/25/25 1501)   sodium chloride 0.9 % bolus 1,000 mL (0 mL Intravenous Stopped 4/25/25 1500)   piperacillin-tazobactam (ZOSYN) 3.375 g IVPB in 100 mL NS MBP (CD) (0 g Intravenous Stopped 4/25/25 1455)   potassium chloride (KLOR-CON M20) CR tablet 40 mEq (40 mEq Oral Given 4/25/25 1410)                   MEDICAL DECISION MAKING, PROGRESS, and CONSULTS    All labs have been independently reviewed by me.  All radiology studies have been reviewed by me and I have also reviewed the radiology report.   EKGs independently viewed and interpreted by me.  Discussion below represents my analysis of pertinent findings related to patient's condition, differential diagnosis, treatment plan and final disposition.      Additional sources:  - Discussed/ obtained  information from independent historians: History obtained from the patient herself at bedside.    - External (non-ED) record review: Upon medical records review, the patient was last seen and evaluated in the outpatient office infectious disease on 4/11/2025 in follow-up for her known MSSA infection.    - Chronic or social conditions impacting care: CAD, CHF, type 2 diabetes mellitus, anticoagulation dependent history of A-fib    - Shared decision making: Admission decision based on shared conversations have between myself, the patient and family at bedside, as well as Dr. Garcia with [facility].      Orders placed during this visit:  Orders Placed This Encounter   Procedures    Critical Care    Blood Culture - Blood,    Blood Culture - Blood,    XR Chest 1 View    Comprehensive Metabolic Panel    Protime-INR    aPTT    High Sensitivity Troponin T    BNP    Lactic Acid, Plasma    Procalcitonin    CBC Auto Differential    Manual Differential    High Sensitivity Troponin T 1Hr    STAT Lactic Acid, Reflex    [facility] (on-call MD unless specified) Details    ECG 12 Lead Other; hypotension/leg swelling    Insert Peripheral IV    Inpatient Admission    CBC & Differential             Differential diagnosis includes but is not limited to:    Sepsis, septic shock, acute bacteremia, cellulitis, abscess, wound infection, CHF with acute exacerbation, or electrolyte disturbance      Independent interpretation of labs, radiology studies, and discussions with consultants:    Chest x-ray independently interpreted by myself with my interpretation showing mild cardiomegaly without edema or infiltrate.      ED Course as of 04/25/25 1522   Fri Apr 25, 2025   1210 The patient's blood pressure is currently fairly markedly low and currently reading 82/45.  Heart rate remains normal however at 80.  I am worried that she has a significant right lower extremity cellulitis that could be causing her hypotension potentially.  She is fairly swollen however  she does require some fluids due to the hypotension.  We will begin IV fluids as we start the workup monitoring very closely. [BM]   1351 The patient's blood pressure is greatly improved and is currently 110/63.  Her white blood cell count is markedly elevated at 25,000 with a neutrophilia.  Her lactate is also 2.1.  Given the hypotension as well as the significant leukocytosis, she very likely has severe sepsis.  We will continue fluid hydration as that has improved the blood pressure and I treated with broad-spectrum antibiotics. [BM]   1353 Potassium(!): 2.7 [BM]   1353 WBC(!): 25.16 [BM]   1505 On reevaluation, the patient is resting comfortably and without further complaint.  I did inform the patient and the patient's family that she does have likely sepsis with a significant right lower extremity cellulitis/wound infection.  Antibiotics are currently infusing and we will admit her to the hospital today for further management and treatment.  She agrees with that plan and all questions answered. [BM]   1508 Patient's blood pressure is also currently 111/64 with a significant positive response to fluid resuscitation. [BM]   1520 The patient's presentation, workup, as well as diagnosis and treatment plan was discussed at length with Dr. Garcia of [facility].  She agrees to admit the patient to the hospital today for further management and treatment. [BM]      ED Course User Index  [BM] Noah Saunders MD           DIAGNOSIS  Final diagnoses:   Sepsis without acute organ dysfunction, due to unspecified organism   Cellulitis of right lower extremity   Leukocytosis, unspecified type   Hypokalemia   Pedal edema         DISPOSITION  ADMISSION    Discussed treatment plan and reason for admission with pt/family and admitting physician.  Pt/family voiced understanding of the plan for admission for further testing/treatment as needed.               Latest Documented Vital Signs:  As of 15:22 EDT  BP- 111/64 HR- 80 Temp- 98.4  °F (36.9 °C) O2 sat- 99%              --    Please note that portions of this were completed with a voice recognition program.       Note Disclaimer: At [facility], we believe that sharing information builds trust and better relationships. You are receiving this note because you are receiving care at [facility] or recently visited. It is possible you will see health information before a provider has talked with you about it. This kind of information can be easy to misunderstand. To help you fully understand what it means for your health, we urge you to discuss this note with your provider.             Noah Saunders MD  04/25/25 0992

## 2025-04-26 ENCOUNTER — APPOINTMENT (OUTPATIENT)
Dept: CARDIOLOGY | Facility: HOSPITAL | Age: OVER 89
DRG: 638 | End: 2025-04-26
Payer: MEDICARE

## 2025-04-26 LAB
ANION GAP SERPL CALCULATED.3IONS-SCNC: 11 MMOL/L (ref 5–15)
BH CV LOWER VASCULAR LEFT COMMON FEMORAL AUGMENT: NORMAL
BH CV LOWER VASCULAR LEFT COMMON FEMORAL COMPETENT: NORMAL
BH CV LOWER VASCULAR LEFT COMMON FEMORAL COMPRESS: NORMAL
BH CV LOWER VASCULAR LEFT COMMON FEMORAL PHASIC: NORMAL
BH CV LOWER VASCULAR LEFT COMMON FEMORAL SPONT: NORMAL
BH CV LOWER VASCULAR RIGHT COMMON FEMORAL AUGMENT: NORMAL
BH CV LOWER VASCULAR RIGHT COMMON FEMORAL COMPETENT: NORMAL
BH CV LOWER VASCULAR RIGHT COMMON FEMORAL COMPRESS: NORMAL
BH CV LOWER VASCULAR RIGHT COMMON FEMORAL PHASIC: NORMAL
BH CV LOWER VASCULAR RIGHT COMMON FEMORAL SPONT: NORMAL
BH CV LOWER VASCULAR RIGHT DISTAL FEMORAL COMPRESS: NORMAL
BH CV LOWER VASCULAR RIGHT GASTRONEMIUS COMPRESS: NORMAL
BH CV LOWER VASCULAR RIGHT GREATER SAPH AK COMPRESS: NORMAL
BH CV LOWER VASCULAR RIGHT GREATER SAPH BK COMPRESS: NORMAL
BH CV LOWER VASCULAR RIGHT LESSER SAPH COMPRESS: NORMAL
BH CV LOWER VASCULAR RIGHT MID FEMORAL AUGMENT: NORMAL
BH CV LOWER VASCULAR RIGHT MID FEMORAL COMPETENT: NORMAL
BH CV LOWER VASCULAR RIGHT MID FEMORAL COMPRESS: NORMAL
BH CV LOWER VASCULAR RIGHT MID FEMORAL PHASIC: NORMAL
BH CV LOWER VASCULAR RIGHT MID FEMORAL SPONT: NORMAL
BH CV LOWER VASCULAR RIGHT PERONEAL COMPRESS: NORMAL
BH CV LOWER VASCULAR RIGHT POPLITEAL AUGMENT: NORMAL
BH CV LOWER VASCULAR RIGHT POPLITEAL COMPETENT: NORMAL
BH CV LOWER VASCULAR RIGHT POPLITEAL COMPRESS: NORMAL
BH CV LOWER VASCULAR RIGHT POPLITEAL PHASIC: NORMAL
BH CV LOWER VASCULAR RIGHT POPLITEAL SPONT: NORMAL
BH CV LOWER VASCULAR RIGHT POSTERIOR TIBIAL COMPRESS: NORMAL
BH CV LOWER VASCULAR RIGHT PROXIMAL FEMORAL COMPRESS: NORMAL
BH CV LOWER VASCULAR RIGHT SAPHENOFEMORAL JUNCTION COMPRESS: NORMAL
BUN SERPL-MCNC: 16 MG/DL (ref 8–23)
BUN/CREAT SERPL: 28.1 (ref 7–25)
CALCIUM SPEC-SCNC: 7.8 MG/DL (ref 8.2–9.6)
CHLORIDE SERPL-SCNC: 107 MMOL/L (ref 98–107)
CO2 SERPL-SCNC: 24 MMOL/L (ref 22–29)
CREAT SERPL-MCNC: 0.57 MG/DL (ref 0.57–1)
DEPRECATED RDW RBC AUTO: 47.4 FL (ref 37–54)
EGFRCR SERPLBLD CKD-EPI 2021: 86.5 ML/MIN/1.73
ERYTHROCYTE [DISTWIDTH] IN BLOOD BY AUTOMATED COUNT: 13.7 % (ref 12.3–15.4)
GLUCOSE BLDC GLUCOMTR-MCNC: 54 MG/DL (ref 70–130)
GLUCOSE BLDC GLUCOMTR-MCNC: 77 MG/DL (ref 70–130)
GLUCOSE SERPL-MCNC: 40 MG/DL (ref 65–99)
HCT VFR BLD AUTO: 33.9 % (ref 34–46.6)
HGB BLD-MCNC: 11.1 G/DL (ref 12–15.9)
INR PPP: 1.77 (ref 0.9–1.1)
MCH RBC QN AUTO: 31 PG (ref 26.6–33)
MCHC RBC AUTO-ENTMCNC: 32.7 G/DL (ref 31.5–35.7)
MCV RBC AUTO: 94.7 FL (ref 79–97)
PLATELET # BLD AUTO: 102 10*3/MM3 (ref 140–450)
PMV BLD AUTO: 11.2 FL (ref 6–12)
POTASSIUM SERPL-SCNC: 2.9 MMOL/L (ref 3.5–5.2)
POTASSIUM SERPL-SCNC: 4.4 MMOL/L (ref 3.5–5.2)
PROTHROMBIN TIME: 20.7 SECONDS (ref 11.7–14.2)
RBC # BLD AUTO: 3.58 10*6/MM3 (ref 3.77–5.28)
SODIUM SERPL-SCNC: 142 MMOL/L (ref 136–145)
WBC NRBC COR # BLD AUTO: 23.04 10*3/MM3 (ref 3.4–10.8)

## 2025-04-26 PROCEDURE — 93971 EXTREMITY STUDY: CPT | Performed by: STUDENT IN AN ORGANIZED HEALTH CARE EDUCATION/TRAINING PROGRAM

## 2025-04-26 PROCEDURE — 25010000002 CEFTRIAXONE PER 250 MG: Performed by: STUDENT IN AN ORGANIZED HEALTH CARE EDUCATION/TRAINING PROGRAM

## 2025-04-26 PROCEDURE — 85027 COMPLETE CBC AUTOMATED: CPT | Performed by: INTERNAL MEDICINE

## 2025-04-26 PROCEDURE — 97530 THERAPEUTIC ACTIVITIES: CPT

## 2025-04-26 PROCEDURE — G0545 PR INHERENT VISIT TO INPT: HCPCS | Performed by: INTERNAL MEDICINE

## 2025-04-26 PROCEDURE — 25010000002 PIPERACILLIN SOD-TAZOBACTAM PER 1 G: Performed by: INTERNAL MEDICINE

## 2025-04-26 PROCEDURE — 99223 1ST HOSP IP/OBS HIGH 75: CPT | Performed by: INTERNAL MEDICINE

## 2025-04-26 PROCEDURE — 94660 CPAP INITIATION&MGMT: CPT

## 2025-04-26 PROCEDURE — 36415 COLL VENOUS BLD VENIPUNCTURE: CPT | Performed by: INTERNAL MEDICINE

## 2025-04-26 PROCEDURE — 80048 BASIC METABOLIC PNL TOTAL CA: CPT | Performed by: INTERNAL MEDICINE

## 2025-04-26 PROCEDURE — 82948 REAGENT STRIP/BLOOD GLUCOSE: CPT

## 2025-04-26 PROCEDURE — 97162 PT EVAL MOD COMPLEX 30 MIN: CPT

## 2025-04-26 PROCEDURE — 94799 UNLISTED PULMONARY SVC/PX: CPT

## 2025-04-26 PROCEDURE — 93971 EXTREMITY STUDY: CPT

## 2025-04-26 PROCEDURE — 25810000003 SODIUM CHLORIDE 0.9 % SOLUTION 250 ML FLEX CONT: Performed by: INTERNAL MEDICINE

## 2025-04-26 PROCEDURE — 25010000002 VANCOMYCIN HCL 1.25 G RECONSTITUTED SOLUTION 1 EACH VIAL: Performed by: INTERNAL MEDICINE

## 2025-04-26 PROCEDURE — 94664 DEMO&/EVAL PT USE INHALER: CPT

## 2025-04-26 PROCEDURE — 84132 ASSAY OF SERUM POTASSIUM: CPT | Performed by: STUDENT IN AN ORGANIZED HEALTH CARE EDUCATION/TRAINING PROGRAM

## 2025-04-26 PROCEDURE — 85610 PROTHROMBIN TIME: CPT | Performed by: INTERNAL MEDICINE

## 2025-04-26 PROCEDURE — 94761 N-INVAS EAR/PLS OXIMETRY MLT: CPT

## 2025-04-26 RX ORDER — CETIRIZINE HYDROCHLORIDE 10 MG/1
10 TABLET ORAL 2 TIMES DAILY
Status: DISCONTINUED | OUTPATIENT
Start: 2025-04-26 | End: 2025-05-05 | Stop reason: HOSPADM

## 2025-04-26 RX ORDER — FLUTICASONE PROPIONATE 50 MCG
1 SPRAY, SUSPENSION (ML) NASAL DAILY
Status: DISCONTINUED | OUTPATIENT
Start: 2025-04-26 | End: 2025-05-05 | Stop reason: HOSPADM

## 2025-04-26 RX ORDER — WARFARIN SODIUM 3 MG/1
3 TABLET ORAL
Status: COMPLETED | OUTPATIENT
Start: 2025-04-26 | End: 2025-04-26

## 2025-04-26 RX ORDER — VANCOMYCIN/0.9 % SOD CHLORIDE 1.5G/250ML
1500 PLASTIC BAG, INJECTION (ML) INTRAVENOUS EVERY 24 HOURS
Status: DISCONTINUED | OUTPATIENT
Start: 2025-04-26 | End: 2025-04-26

## 2025-04-26 RX ORDER — WARFARIN SODIUM 2 MG/1
2 TABLET ORAL
Status: DISCONTINUED | OUTPATIENT
Start: 2025-04-27 | End: 2025-05-02

## 2025-04-26 RX ORDER — POTASSIUM CHLORIDE 1500 MG/1
40 TABLET, EXTENDED RELEASE ORAL EVERY 4 HOURS
Status: COMPLETED | OUTPATIENT
Start: 2025-04-26 | End: 2025-04-26

## 2025-04-26 RX ADMIN — Medication 4 ML: at 06:46

## 2025-04-26 RX ADMIN — CALCIUM CARBONATE-VITAMIN D TAB 500 MG-200 UNIT 1 TABLET: 500-200 TAB at 08:29

## 2025-04-26 RX ADMIN — PIPERACILLIN AND TAZOBACTAM 3.38 G: 3; .375 INJECTION, POWDER, FOR SOLUTION INTRAVENOUS at 12:57

## 2025-04-26 RX ADMIN — BUDESONIDE 0.5 MG: 0.5 INHALANT RESPIRATORY (INHALATION) at 19:00

## 2025-04-26 RX ADMIN — ANTI-FUNGAL POWDER MICONAZOLE NITRATE TALC FREE 1 APPLICATION: 1.42 POWDER TOPICAL at 21:38

## 2025-04-26 RX ADMIN — WARFARIN SODIUM 3 MG: 3 TABLET ORAL at 18:05

## 2025-04-26 RX ADMIN — SODIUM CHLORIDE 2000 MG: 9 INJECTION, SOLUTION INTRAVENOUS at 18:07

## 2025-04-26 RX ADMIN — OXYBUTYNIN CHLORIDE 10 MG: 10 TABLET, EXTENDED RELEASE ORAL at 21:36

## 2025-04-26 RX ADMIN — WARFARIN 2 MG: 2 TABLET ORAL at 01:52

## 2025-04-26 RX ADMIN — OXYBUTYNIN CHLORIDE 10 MG: 10 TABLET, EXTENDED RELEASE ORAL at 08:29

## 2025-04-26 RX ADMIN — VANCOMYCIN HYDROCHLORIDE 1250 MG: 1.25 INJECTION, POWDER, LYOPHILIZED, FOR SOLUTION INTRAVENOUS at 15:47

## 2025-04-26 RX ADMIN — CETIRIZINE HYDROCHLORIDE 10 MG: 10 TABLET ORAL at 21:36

## 2025-04-26 RX ADMIN — IPRATROPIUM BROMIDE AND ALBUTEROL SULFATE 3 ML: .5; 3 SOLUTION RESPIRATORY (INHALATION) at 06:46

## 2025-04-26 RX ADMIN — PIPERACILLIN AND TAZOBACTAM 3.38 G: 3; .375 INJECTION, POWDER, FOR SOLUTION INTRAVENOUS at 05:44

## 2025-04-26 RX ADMIN — ANTI-FUNGAL POWDER MICONAZOLE NITRATE TALC FREE 1 APPLICATION: 1.42 POWDER TOPICAL at 08:30

## 2025-04-26 RX ADMIN — MONTELUKAST 10 MG: 10 TABLET, FILM COATED ORAL at 21:36

## 2025-04-26 RX ADMIN — IPRATROPIUM BROMIDE AND ALBUTEROL SULFATE 3 ML: .5; 3 SOLUTION RESPIRATORY (INHALATION) at 19:10

## 2025-04-26 RX ADMIN — ROSUVASTATIN CALCIUM 20 MG: 10 TABLET, FILM COATED ORAL at 21:36

## 2025-04-26 RX ADMIN — POTASSIUM CHLORIDE 40 MEQ: 1500 TABLET, EXTENDED RELEASE ORAL at 08:29

## 2025-04-26 RX ADMIN — CALCIUM CARBONATE-VITAMIN D TAB 500 MG-200 UNIT 1 TABLET: 500-200 TAB at 21:36

## 2025-04-26 RX ADMIN — POTASSIUM CHLORIDE 40 MEQ: 1500 TABLET, EXTENDED RELEASE ORAL at 18:05

## 2025-04-26 RX ADMIN — BUDESONIDE 0.5 MG: 0.5 INHALANT RESPIRATORY (INHALATION) at 06:46

## 2025-04-26 RX ADMIN — FLUTICASONE PROPIONATE 1 SPRAY: 50 SPRAY, METERED NASAL at 18:08

## 2025-04-26 RX ADMIN — Medication 4 ML: at 19:10

## 2025-04-26 RX ADMIN — POTASSIUM CHLORIDE 40 MEQ: 1500 TABLET, EXTENDED RELEASE ORAL at 12:56

## 2025-04-26 NOTE — PROGRESS NOTES
[facility] Clinical Pharmacy Services: Warfarin Dosing/Monitoring Consult    Caitlyn Longoria is a 89 y.o. female, estimated creatinine clearance is 66.2 mL/min (by C-G formula based on SCr of 0.57 mg/dL). weighing 81.2 kg (179 lb 0.2 oz).    Results from last 7 days   Lab Units 04/26/25  0603 04/26/25  0552 04/25/25  1237 04/21/25  0000   INR   --  1.77* 2.08* 3.60   APTT seconds  --   --  60.5*  --    HEMOGLOBIN g/dL 11.1*  --  11.9*  --    HEMATOCRIT % 33.9*  --  36.8  --    PLATELETS 10*3/mm3 102*  --  127*  --      Prior to admission anticoagulation: warfarin 2 mg daily (per [facility] anticoag visit on 4/21)     Hospital Anticoagulation:  Consulting provider: Dr. Garcia  Start date: 4/25  Indication: A Fib - requiring full anticoagulation  Target INR: 2 - 3  Expected duration: lifelong   Bridge Therapy: No     Potential food or drug interactions:   - Pip/Tazo- may enhance anticoagulant effect of warfarin (4/25- )  - Rosuvastatin- may enhance the anticoagulant effect of warfarin (home med)    Education complete?/Date: N/A; home medication    Assessment/Plan:  INR fell from 2.08 to 1.77 today. She was last seen in our anticoag clinic on 4/21 where her INR was noted to be high (3.6) possibly due to her course of prednisone/azithromycin. She was instructed to hold her warfarin on 4/21 and 4/23. Hesitant to increase dose too much today given age and start of inpatient antibiotics, but will increase dose slightly to 3 mg x1 today and look at INR trend again tomorrow.   Monitor for any signs or symptoms of bleeding; platelets low at 102 (from 127)- would continue to monitor  Follow up daily INRs and dose adjustments    Pharmacy will continue to follow until discharge or discontinuation of warfarin.     Phong Bird [facility]  Clinical Pharmacist

## 2025-04-26 NOTE — PLAN OF CARE
Goal Outcome Evaluation:  Plan of Care Reviewed With: patient        Progress: improving  Outcome Evaluation: Vital signs stable. Admit for cellulitis, Bilateral lower extremity. RLE edema greater than left. IV Zosyn and IV vanc scheduled. Wound RN consulted for RLE wound and sacral spine. Pictures uploaded as wounds present on admission. Accumax pump on bed. Q2hr turn. Skin care provided. [place] to monitor urine output. No BM this shift. PT/OT consulted. Blood glucose 40 this am per lab. Pt asymptomatic. Awake alert and oriented. Pt able to drink 2 cups OJ. Glucose recheck 77 and reported to dayshift RN. No complaints or concerns per patient. Safety maintained. Plan of care ongoing.

## 2025-04-26 NOTE — PLAN OF CARE
Goal Outcome Evaluation:  Plan of Care Reviewed With: patient        Progress: improving  Outcome Evaluation: Patient c/o RLE pain, BLE red and swollen. Patient currently off unit for RLE doppler. Up in chair this afternoon. Potassium being replaced per protocol. ID and pulm consulted. Continues on IV vanc, IV zosyn discontinued and IV rocephin started. Continues on Coumadin. Code status changed to DNR/DNI.

## 2025-04-26 NOTE — H&P
HISTORY AND PHYSICAL   [facility]        Date of Admission: 2025  Patient Identification:  Name: Caitlyn Longoria  Age: 89 y.o.  Sex: female  :  1934  MRN: [id number]                     Primary Care Physician: Zenaida Suarez MD    Chief Complaint:  89 year old female presented to the emergency room with malaise, leg pain and swelling for several days; the right side is worse than the left; she denies injury but has some wounds; she yadiel fever or chill; no visitors are present with her; she was hypotensive initially but has responded to fluids;     History of Present Illness:   As above    Past Medical History:  Past Medical History:   Diagnosis Date    Aortic calcification     mild, on echo 2017    Aortic regurgitation     Trace    Asthma     Atrial fibrillation     CAD (coronary artery disease)     Carpal tunnel syndrome of left wrist     Chronic combined systolic and diastolic congestive heart failure     CKD (chronic kidney disease) stage 3, GFR 30-59 ml/min     COPD (chronic obstructive pulmonary disease)     Coronary artery disease involving native coronary artery of native heart with angina pectoris     Disc degeneration, lumbar     Diverticulosis     DM type 2 (diabetes mellitus, type 2)     Dyslipidemia     GERD (gastroesophageal reflux disease)     History of aneurysm     right femoral artery s/p LHC    History of blood transfusion     History of fracture     History of heart attack     History of vitamin D deficiency     Hyperlipidemia     Hypertension     Leukemia     Mild mitral regurgitation     Mitral annular calcification     2017- echo, moderate    Osteopenia     PAF (paroxysmal atrial fibrillation)     Peripheral neuropathy     Skin cancer     Left hand    Sleep apnea     bipap    SSS (sick sinus syndrome)     Stroke (cerebrum)     TIA (transient ischemic attack) 2017    Tricuspid regurgitation     Trace     Past Surgical History:  Past Surgical  History:   Procedure Laterality Date    BRONCHOSCOPY Bilateral 10/6/2020    Procedure: BRONCHOSCOPY with BILATERAL LUNG washings;  Surgeon: Juno Coburn MD;  Location: [facility] ENDOSCOPY;  Service: Pulmonary;  Laterality: Bilateral;  PRE: purulent bronchitis  POST: PURULENT BRONCHITIS    BRONCHOSCOPY Bilateral 10/9/2020    Procedure: BRONCHOSCOPY with washing;  Surgeon: Juno Coburn MD;  Location: [facility] END[facility]COPY;  Service: Pulmonary;  Laterality: Bilateral;  pre/post - mucous plug      CARDIAC CATHETERIZATION      CARDIAC ELECTROPHYSIOLOGY PROCEDURE N/A 2/7/2020    Procedure: PPM generator change - dual  medtronic;  Surgeon: Kiel Field MD;  Location: [facility] CATH INVASIVE LOCATION;  Service: Cardiology;  Laterality: N/A;    CHOLECYSTECTOMY      CORONARY STENT PLACEMENT      ENDOSCOPY N/A 9/14/2022    Procedure: ESOPHAGOGASTRODUODENOSCOPY with 54fr main dilatation;  Surgeon: Enio Cota MD;  Location: [facility] ENDOSCOPY;  Service: Gastroenterology;  Laterality: N/A;  pre - dysphagia  post - s/p dilatation, watermelon stomach    HERNIA REPAIR      hital hernia    HYSTERECTOMY      PACEMAKER IMPLANTATION      REPLACEMENT TOTAL KNEE Bilateral       Home Meds:  Medications Prior to Admission   Medication Sig Dispense Refill Last Dose/Taking    acetaminophen (TYLENOL) 325 MG tablet Take 2 tablets by mouth Every 4 (Four) Hours As Needed for Mild Pain .   4/24/2025    albuterol (PROVENTIL) (2.5 MG/3ML) 0.083% nebulizer solution Take 2.5 mg by nebulization Every 4 (Four) Hours.   4/25/2025    albuterol sulfate  (90 Base) MCG/ACT inhaler Inhale 2 puffs Every 4 (Four) Hours As Needed for Wheezing.   4/25/2025    Benralizumab (FASENRA SC) Inject 1 dose under the skin into the appropriate area as directed Every 2 (Two) Months. Every 8 weeks   Past Month    budesonide (PULMICORT) 0.5 MG/2ML nebulizer solution Take 2 mL by nebulization 2 (Two) Times a Day.   4/24/2025    Calcium Carbonate-Vitamin D  (calcium 500 mg vitamin D 5 mcg, 200 UT,) 500-5 MG-MCG tablet per tablet Take 1 tablet by mouth 2 (Two) Times a Day. 60 tablet 3 4/24/2025    carvedilol (COREG) 3.125 MG tablet Take 1 tablet by mouth 2 (Two) Times a Day With Meals.   4/24/2025    cephalexin (KEFLEX) 500 MG capsule Take 1 capsule by mouth 3 (Three) Times a Day for 90 days. 270 capsule 3 4/24/2025    Cetirizine HCl 10 MG capsule Take 1 capsule by mouth 2 (Two) Times a Day.   4/24/2025    Cholecalciferol 25 MCG (1000 UT) tablet Take 1 tablet by mouth Daily.   4/24/2025    fluticasone (FLONASE) 50 MCG/ACT nasal spray Administer 1 spray into the nostril(s) as directed by provider Daily.   4/24/2025    furosemide (LASIX) 20 MG tablet Take 1 tablet by mouth As Needed (For weight gain of greater than 2 pounds in 1 day or 5 pounds in 1 week). (Patient taking differently: Take 1 tablet by mouth Daily.) 30 tablet 11 4/24/2025    glipizide (GLUCOTROL) 5 MG tablet Take 1 tablet by mouth Daily With Breakfast.   4/24/2025    ipratropium-albuterol (DUO-NEB) 0.5-2.5 mg/3 ml nebulizer Take 3 mL by nebulization 4 (Four) Times a Day. 360 mL 0 4/25/2025    loperamide (IMODIUM) 2 MG capsule Take 1 capsule by mouth 4 (Four) Times a Day As Needed for Diarrhea.   Past Week    melatonin 5 MG tablet tablet Take 1 tablet by mouth Every Night.   Past Week    metFORMIN ER (GLUCOPHAGE-XR) 500 MG 24 hr tablet Take 1 tablet by mouth Daily With Breakfast.   4/24/2025    montelukast (SINGULAIR) 10 MG tablet Take 1 tablet by mouth Every Night.   4/24/2025    nystatin (MYCOSTATIN) 316690 UNIT/GM cream Apply 1 Application topically to the appropriate area as directed 2 (Two) Times a Day.   4/24/2025    nystatin-triamcinolone (MYCOLOG II) 537516-7.1 UNIT/GM-% cream Apply 1 Application topically to the appropriate area as directed 2 (Two) Times a Day.   4/24/2025    oxybutynin XL (DITROPAN-XL) 10 MG 24 hr tablet Take 1 tablet by mouth 2 (Two) Times a Day.   4/24/2025    rosuvastatin  (CRESTOR) 20 MG tablet Take 1 tablet by mouth Every Night.   4/24/2025    sodium chloride 7 % nebulizer solution nebulizer solution Take 4 mL by nebulization 2 (Two) Times a Day.   4/25/2025    warfarin (COUMADIN) 4 MG tablet Take one tablet by mouth on mon, wed, fri and take one-half of a tablet by mouth all other days or as directed 65 tablet 1 4/24/2025    Acetylcysteine (NAC PO) Take 1,000 mg by mouth 2 (Two) Times a Day.          Allergies:  Allergies   Allergen Reactions    Accupril [Quinapril Hcl] Swelling, Other (See Comments), GI Intolerance and Delirium     HA, constipation     Ahist [Chlorpheniramine] Nausea Only, Other (See Comments) and Dizziness     Headache, Blurred vision    Clarithromycin Nausea Only, Other (See Comments) and Mental Status Change     HA, Depression, Flushing    Esomeprazole GI Intolerance    Latex Other (See Comments)     Skin breakdown    Levalbuterol Swelling    Levocetirizine Diarrhea and GI Intolerance    Lipitor [Atorvastatin] Other (See Comments) and Myalgia     Dark urine    Omeprazole Nausea Only and Other (See Comments)     HA    Pravachol [Pravastatin] Nausea Only and GI Intolerance     Bloated, Constipation, HA    Sulindac Other (See Comments) and Myalgia     HA, joint pain, bruising    Valdecoxib Irritability    Chlorcyclizine Unknown - Low Severity    Chlorpheniramine-Phenylephrine Unknown - High Severity    Diclofenac Sodium Unknown - Low Severity    Diphenhydramine Unknown - Low Severity    Lodine [Etodolac] Unknown - Low Severity    Sulfa Antibiotics Unknown - Low Severity     Immunizations:  Immunization History   Administered Date(s) Administered    COVID-19 (PFIZER) 12YRS+ (COMIRNATY) 10/11/2023    COVID-19 (PFIZER) BIVALENT 12+YRS 04/03/2023    COVID-19 (PFIZER) Purple Cap Monovalent 01/17/2021, 02/05/2021, 09/04/2021    COVID-19 (UNSPECIFIED) 01/17/2021, 02/05/2021    Covid-19 (Pfizer) Gray Cap Monovalent 07/27/2022    Flu Vaccine Split Quad 09/01/2018,  09/01/2019    Fluad Quad 65+ 09/17/2020    Fluzone  >6mos 09/17/2020    Fluzone (or Fluarix & Flulaval for VFC) >6mos 09/17/2019    Fluzone High-Dose 65+YRS 09/27/2013, 10/14/2014, 09/12/2016, 10/04/2017, 09/20/2018, 09/17/2020, 10/04/2021    Fluzone High-Dose 65+yrs 10/03/2022    Fluzone Quad >6mos (Multi-dose) 09/17/2020    Pneumococcal Conjugate 13-Valent (PCV13) 12/12/2017    Pneumococcal Polysaccharide (PPSV23) 01/01/2009, 09/27/2013    Shingrix 11/06/2019, 02/28/2020, 02/28/2020    Td, Not Adsorbed 01/01/1993    Tdap 05/16/2022     Social History:   Social History     Social History Narrative    Not on file     Social History     Socioeconomic History    Marital status: Single   Tobacco Use    Smoking status: Never     Passive exposure: Never    Smokeless tobacco: Never    Tobacco comments:     caffeine use- soda   Vaping Use    Vaping status: Never Used   Substance and Sexual Activity    Alcohol use: Never    Drug use: No    Sexual activity: Defer       Family History:  Family History   Problem Relation Age of Onset    Heart disease Mother     Hypertension Mother     Colon polyps Mother     Heart disease Father     Hypertension Father     Stroke Father     Hypertension Brother     Heart disease Brother     Diabetes Niece     Colon cancer Sister     Hypertension Sister     Hypertension Daughter     Hypertension Son     Hypertension Maternal Aunt     Hypertension Maternal Grandmother     Hypertension Maternal Grandfather     Hypertension Paternal Grandmother     Hypertension Paternal Grandfather         Review of Systems  See history of present illness and past medical history.  Patient denies headache, dizziness, syncope, falls, trauma, change in vision, change in hearing, change in taste, changes in weight, changes in appetite, focal weakness, numbness, or paresthesia.  Patient denies chest pain, palpitations, dyspnea, orthopnea, PND, cough, sinus pressure, rhinorrhea, epistaxis, hemoptysis, nausea,  "vomiting,hematemesis, diarrhea, constipation or hematochezia.  Denies cold or heat intolerance, polydipsia, polyuria, polyphagia. Denies hematuria, pyuria, dysuria, hesitancy, frequency or urgency. Denies consumption of raw and under cooked meats foods or change in water source.  Denies fever, chills, sweats, night sweats.       Objective:  T Max 24 hrs: Temp (24hrs), Av.9 °F (36.6 °C), Min:97.7 °F (36.5 °C), Max:98.4 °F (36.9 °C)    Vitals Ranges:   Temp:  [97.7 °F (36.5 °C)-98.4 °F (36.9 °C)] 97.7 °F (36.5 °C)  Heart Rate:  [77-88] 77  Resp:  [16-18] 18  BP: ()/(45-64) 95/61      Exam:  BP 95/61 (BP Location: Right arm)   Pulse 77   Temp 97.7 °F (36.5 °C) (Oral)   Resp 18   Ht 160 cm (62.99\")   Wt 78.2 kg (172 lb 8 oz)   LMP  (LMP Unknown)   SpO2 100%   BMI 30.56 kg/m²     General Appearance:    Alert, cooperative, no distress, appears stated age   Head:    Normocephalic, without obvious abnormality, atraumatic   Eyes:    PERRL, conjunctivae/corneas clear, EOM's intact, both eyes   Ears:    Normal external ear canals, both ears   Nose:   Nares normal, septum midline, mucosa normal, no drainage    or sinus tenderness   Throat:   Lips, mucosa, and tongue normal   Neck:   Supple, symmetrical, trachea midline, no adenopathy;     thyroid:  no enlargement/tenderness/nodules; no carotid    bruit or JVD   Back:     Symmetric, no curvature, ROM normal, no CVA tenderness   Lungs:     Clear to auscultation bilaterally, respirations unlabored   Chest Wall:    No tenderness or deformity    Heart:    Regular rate and rhythm, S1 and S2 normal, no murmur, rub   or gallop   Abdomen:     Soft, nontender, bowel sounds active all four quadrants,     no masses, no hepatomegaly, no splenomegaly   Extremities:   Extremities normal, atraumatic, warmth, erythema both legs;       .    Data Review:  Labs in chart were reviewed.  WBC   Date Value Ref Range Status   2025 25.16 (H) 3.40 - 10.80 10*3/mm3 Final "     Hemoglobin   Date Value Ref Range Status   04/25/2025 11.9 (L) 12.0 - 15.9 g/dL Final     Hematocrit   Date Value Ref Range Status   04/25/2025 36.8 34.0 - 46.6 % Final     Platelets   Date Value Ref Range Status   04/25/2025 127 (L) 140 - 450 10*3/mm3 Final     Sodium   Date Value Ref Range Status   04/25/2025 140 136 - 145 mmol/L Final     Potassium   Date Value Ref Range Status   04/25/2025 2.7 (L) 3.5 - 5.2 mmol/L Final     Chloride   Date Value Ref Range Status   04/25/2025 101 98 - 107 mmol/L Final     CO2   Date Value Ref Range Status   04/25/2025 28.0 22.0 - 29.0 mmol/L Final     BUN   Date Value Ref Range Status   04/25/2025 22 8 - 23 mg/dL Final     Creatinine   Date Value Ref Range Status   04/25/2025 0.89 0.57 - 1.00 mg/dL Final     Glucose   Date Value Ref Range Status   04/25/2025 131 (H) 65 - 99 mg/dL Final     Calcium   Date Value Ref Range Status   04/25/2025 8.9 8.2 - 9.6 mg/dL Final     AST (SGOT)   Date Value Ref Range Status   04/25/2025 23 1 - 32 U/L Final     ALT (SGPT)   Date Value Ref Range Status   04/25/2025 45 (H) 1 - 33 U/L Final     Alkaline Phosphatase   Date Value Ref Range Status   04/25/2025 109 39 - 117 U/L Final                Imaging Results (All)       Procedure Component Value Units Date/Time    XR Chest 1 View [[id number]] Collected: 04/25/25 1314     Updated: 04/25/25 1324    Narrative:      XR CHEST 1 VW-     HISTORY: Female who is 89 years-old, bilateral lower extremity edema     TECHNIQUE: Frontal views of the chest     COMPARISON: 2/25/2025     FINDINGS: The heart is enlarged. Left-sided pacemaker, cardiac leads are  seen. Pulmonary vasculature is unremarkable. Aorta is calcified. No  focal pulmonary consolidation, pleural effusion, or pneumothorax. Left  hemidiaphragm remains elevated. Gaseous distention of stomach is  apparent. No acute osseous process.       Impression:      No focal pulmonary consolidation. Cardiomegaly. Follow-up as  clinical indications  persist.     This report was finalized on 4/25/2025 1:21 PM by Dr. Mitul Hudson M.D on Workstation: [id number]                 Assessment:  Active Hospital Problems    Diagnosis  POA    **Sepsis [A41.9]  Yes      Resolved Hospital Problems   No resolved problems to display.   Cellulitis  Copd  Cad  Paf  Ckd3  Diabetes  Hyperlipidemia  Hypokalemia  Sleep apnea    Plan:  Continue antiboitics, fluids  Monitor on telemetry  Replace potassium  Trend wbc  Await cultures  Accu checks, sliding scale  Dw patient, ed provider  She is full code and zach mera is [facility]  Jojo Garcia MD  4/25/2025  20:47 EDT

## 2025-04-26 NOTE — PROGRESS NOTES
Name: Caitlyn Longoria ADMIT: 2025   : 1934  PCP: Zenaida Suarez MD    MRN: [id number] LOS: 1 days   AGE/SEX: 89 y.o. female  ROOM: [facility]     Subjective   Subjective   Patient sitting up in her recliner, she is weak and having trouble standing from a seated position even with the help of aide. RLE is painful as well as red.        Objective   Objective   Vital Signs  Temp:  [97.2 °F (36.2 °C)-98.4 °F (36.9 °C)] 97.2 °F (36.2 °C)  Heart Rate:  [69-82] 69  Resp:  [16-18] 16  BP: ()/(53-62) 108/58  SpO2:  [95 %-100 %] 97 %  on   ;   Device (Oxygen Therapy): room air  Body mass index is 31.72 kg/m².  Physical Exam  Constitutional:       General: She is not in acute distress.     Appearance: She is obese. She is ill-appearing (Chronically ill, frail).   HENT:      Head: Normocephalic and atraumatic.      Mouth/Throat:      Mouth: Mucous membranes are moist.      Pharynx: Oropharynx is clear.   Cardiovascular:      Rate and Rhythm: Normal rate and regular rhythm.   Pulmonary:      Effort: Pulmonary effort is normal. No respiratory distress.      Breath sounds: Normal breath sounds. No wheezing.   Abdominal:      General: Abdomen is flat.      Palpations: Abdomen is soft.      Tenderness: There is no abdominal tenderness.   Musculoskeletal:         General: Swelling and tenderness present.      Right lower leg: Edema present.      Left lower leg: Edema present.      Comments: Right>Left edema, 2+ pitting edema on right; 1+ on left  Erythema noted on RLE; chronic venous stasis changes bilaterally   Skin:     General: Skin is warm and dry.   Neurological:      General: No focal deficit present.      Mental Status: She is alert and oriented to person, place, and time. Mental status is at baseline.         Results Review     I reviewed the patient's new clinical results.  Results from last 7 days   Lab Units 25  0603 25  1237   WBC 10*3/mm3 23.04* 25.16*   HEMOGLOBIN g/dL 11.1*  11.9*   PLATELETS 10*3/mm3 102* 127*     Results from last 7 days   Lab Units 04/26/25  0604 04/25/25  1237   SODIUM mmol/L 142 140   POTASSIUM mmol/L 2.9* 2.7*   CHLORIDE mmol/L 107 101   CO2 mmol/L 24.0 28.0   BUN mg/dL 16 22   CREATININE mg/dL 0.57 0.89   GLUCOSE mg/dL 40* 131*   Estimated Creatinine Clearance: 66.2 mL/min (by C-G formula based on SCr of 0.57 mg/dL).  Results from last 7 days   Lab Units 04/25/25  1237   ALBUMIN g/dL 3.3*   BILIRUBIN mg/dL 1.3*   ALK PHOS U/L 109   AST (SGOT) U/L 23   ALT (SGPT) U/L 45*     Results from last 7 days   Lab Units 04/26/25  0604 04/25/25  1237   CALCIUM mg/dL 7.8* 8.9   ALBUMIN g/dL  --  3.3*     Results from last 7 days   Lab Units 04/25/25  1550 04/25/25  1237   PROCALCITONIN ng/mL  --  0.27*   LACTATE mmol/L 1.2 2.1*     COVID19   Date Value Ref Range Status   02/25/2025 Not Detected Not Detected - Ref. Range Final   09/27/2024 Not Detected Not Detected - Ref. Range Final     Glucose   Date/Time Value Ref Range Status   04/26/2025 0724 77 70 - 130 mg/dL Final   04/26/2025 0644 54 (L) 70 - 130 mg/dL Final       XR Chest 1 View  Narrative: XR CHEST 1 VW-     HISTORY: Female who is 89 years-old, bilateral lower extremity edema     TECHNIQUE: Frontal views of the chest     COMPARISON: 2/25/2025     FINDINGS: The heart is enlarged. Left-sided pacemaker, cardiac leads are  seen. Pulmonary vasculature is unremarkable. Aorta is calcified. No  focal pulmonary consolidation, pleural effusion, or pneumothorax. Left  hemidiaphragm remains elevated. Gaseous distention of stomach is  apparent. No acute osseous process.     Impression: No focal pulmonary consolidation. Cardiomegaly. Follow-up as  clinical indications persist.     This report was finalized on 4/25/2025 1:21 PM by Dr. Mitul Hudson M.D on Workstation: [id number]       Scheduled Medications  budesonide, 0.5 mg, Nebulization, BID - RT  calcium 500 mg vitamin D 5 mcg (200 UT), 1 tablet, Oral, BID  cefTRIAXone,  2,000 mg, Intravenous, Q24H  cetirizine, 10 mg, Oral, BID  cholecalciferol, 1,000 Units, Oral, Daily  fluticasone, 1 spray, Nasal, Daily  ipratropium-albuterol, 3 mL, Nebulization, Q6H While Awake - RT  miconazole, 1 Application, Topical, Q12H  montelukast, 10 mg, Oral, Nightly  oxybutynin XL, 10 mg, Oral, BID  potassium chloride ER, 40 mEq, Oral, Q4H  rosuvastatin, 20 mg, Oral, Nightly  sodium chloride, 4 mL, Nebulization, BID - RT  vancomycin, 1,250 mg, Intravenous, Q24H  [START ON 4/27/2025] warfarin, 2 mg, Oral, Daily  warfarin, 3 mg, Oral, Once    Infusions  Pharmacy to dose vancomycin,   Pharmacy to dose warfarin,     Diet  Diet: Cardiac; Healthy Heart (2-3 Na+); Fluid Consistency: Thin (IDDSI 0)         I have personally reviewed:  [x]  Laboratory   []  Microbiology   []  Radiology   [x]  EKG/Telemetry   []  Cardiology/Vascular   []  Pathology   []  Records    Assessment/Plan     Active Hospital Problems    Diagnosis  POA    **Cellulitis of right lower extremity [L03.115]  Yes    Thrombocytopenia [D69.6]  Yes    Sick sinus syndrome [I49.5]  Yes    Chronic HFrEF (heart failure with reduced ejection fraction) [I50.22]  Yes    COPD (chronic obstructive pulmonary disease) [J44.9]  Yes    Bilateral carotid artery disease [I77.9]  Yes    Anticoagulated on Coumadin [Z79.01]  Not Applicable    CLL (chronic lymphocytic leukemia) [C91.10]  Yes    HTN (hypertension) [I10]  Yes    CKD (chronic kidney disease), stage III [N18.30]  Yes    Paroxysmal atrial fibrillation [I48.0]  Yes    Type 2 diabetes mellitus [E11.9]  Yes    HLD (hyperlipidemia) [E78.5]  Yes    Coronary artery disease [I25.10]  Yes      Resolved Hospital Problems   No resolved problems to display.       89 y.o. female admitted with Cellulitis of right lower extremity.    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 Miller  - patient developed RLE cellulitis while on suppressive keflex, have asked ID to consult; cont  broad spectrum abx for now- d/w Dr. Sy- will change zosyn to rocephin.  - pt initially with borderline low Bps 70s/40s- she responded well to IVF, did not meet sepsis criteria (WBC elevation only)  -agree with RLE doppler to evaluate for DVT- pt INR is subtherapeutic on admission    Atrial fibrillation on warfarin  S/p pacemaker  HTN  HLD  - pt was hypotensive on arrival to ED so her coreg and loop diuretic were held, she responded to IV fluids- continue to hold BP meds until BP is more improved/stable  - may need to utilize metoprolol for rate control if BP continues to be low/normal  - cont statin  - pharmacy to dose warfarin    COPD/chronic hypoxic respiratory failure on home O2  - not in exacerbation/at baseline  - continue home inhalers    CHARLENE on home bipap  - Patient reports that she is unable to bring her BiPAP machine in from home as she is worried it is going to be broken during transit which has happened previously  - she is requesting pulm consult for bipap    CLL- has never required tx/follows with Dr. 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  DNR.  Discussed with patient, nursing staff, and consulting provider. D/w Dr. Sy re: abx.   Anticipated discharge SNF, next week        Lora Leroy MD  [facility]  04/26/25  15:54 EDT    I wore protective equipment throughout this patient encounter including gloves.  Hand hygiene was performed before donning protective equipment and after removal when leaving the room.         Copied text in this note has been reviewed and is accurate as of 04/26/25.

## 2025-04-26 NOTE — CONSULTS
Referring Provider: Noah Saunders MD  [address] [place] [address]  Reason for Consultation: Concern for bilateral lower extremity cellulitis?    Subjective   History of present illness: This is a 89-year-old female with a history of coronary artery disease, COPD, CKD 3, type 2 diabetes, stroke and right prosthetic knee infection on chronic antibiotic biotic suppressive therapy who was admitted on April 25 with bilateral lower extremity swelling right greater than left.  The patient was last seen by Dr. Miller in the infectious disease clinic on April 11 for prosthetic right knee septic arthritis.  She completed IV antibiotics in February 2018 and has been on suppressive antibiotic therapy since that time.  Currently she is on Keflex 500 mg p.o. every 8 hours.  At that time she was doing well but a few days ago she developed worsening lower extremity edema right greater than left.  She denies any fevers or chills.  Admission labs revealed a white blood cell count of 25,000 with a procalcitonin of 0.27.  There was concern for lower extremity cellulitis and she was empirically started on vancomycin and Zosyn.      Past Medical History:   Diagnosis Date    CAD (coronary artery disease)     CKD (chronic kidney disease) stage 3, GFR 30-59 ml/min     COPD (chronic obstructive pulmonary disease)     DM type 2 (diabetes mellitus, type 2)     GERD (gastroesophageal reflux disease)     Hyperlipidemia     Hypertension     PAF (paroxysmal atrial fibrillation)     Peripheral neuropathy     Sleep apnea     bipap    SSS (sick sinus syndrome)     Stroke (cerebrum)    Hypogammaglobulinemia    Past Surgical History:   Procedure Laterality Date    CHOLECYSTECTOMY      HERNIA REPAIR      hital hernia    HYSTERECTOMY      PACEMAKER IMPLANTATION      REPLACEMENT TOTAL KNEE Bilateral         reports that she has never smoked. She has never been exposed to tobacco smoke. She has never used smokeless tobacco. She  reports that she does not drink alcohol and does not use drugs.    family history includes Colon cancer in her sister; Colon polyps in her mother; Diabetes in her niece; Heart disease in her brother, father, and mother; Hypertension in her brother, daughter, father, maternal aunt, maternal grandfather, maternal grandmother, mother, paternal grandfather, paternal grandmother, sister, and son; Stroke in her father.    Allergies   Allergen Reactions    Accupril [Quinapril Hcl] Swelling, Other (See Comments), GI Intolerance and Delirium     HA, constipation     Ahist [Chlorpheniramine] Nausea Only, Other (See Comments) and Dizziness     Headache, Blurred vision    Clarithromycin Nausea Only, Other (See Comments) and Mental Status Change     HA, Depression, Flushing    Esomeprazole GI Intolerance    Latex Other (See Comments)     Skin breakdown    Levalbuterol Swelling    Levocetirizine Diarrhea and GI Intolerance    Lipitor [Atorvastatin] Other (See Comments) and Myalgia     Dark urine    Omeprazole Nausea Only and Other (See Comments)     HA    Pravachol [Pravastatin] Nausea Only and GI Intolerance     Bloated, Constipation, HA    Sulindac Other (See Comments) and Myalgia     HA, joint pain, bruising    Valdecoxib Irritability    Chlorcyclizine Unknown - Low Severity    Chlorpheniramine-Phenylephrine Unknown - High Severity    Diclofenac Sodium Unknown - Low Severity    Diphenhydramine Unknown - Low Severity    Lodine [Etodolac] Unknown - Low Severity    Sulfa Antibiotics Unknown - Low Severity       Medication:  Antibiotics:  Vancomycin dosing per pharmacy  Zosyn 3.375 g IV every 8 hours    Please refer to the medical record for a full medication list    Review of Systems  Pertinent items are noted in HPI, all other systems reviewed and negative    Objective   Vital Signs   Temp:  [97.7 °F (36.5 °C)-98.4 °F (36.9 °C)] 98.4 °F (36.9 °C)  Heart Rate:  [77-88] 82  Resp:  [16-18] 16  BP: ()/(45-64) 98/62    Physical  Exam:   General: In no acute distress  HEENT: Normocephalic, atraumatic,no scleral icterus.   Neck: Supple, trachea is midline  Cardiovascular: Normal rate, regular rhythm,   Respiratory: Faint crackles at the bases bilaterally  GI: Abdomen is soft, nontender, nondistended  Musculoskeletal: Bilateral knee incisions healed well without erythema or drainage, right calf area erythematous wound without purulence warm to the touch  Skin: No rashes   Neurological: Alert and oriented, moving all 4 extremities  Psychiatric: Normal mood and affect     Results Review:   I reviewed the patient's new clinical results.  I reviewed the patient's new imaging results and agree with the interpretation.    Lab Results   Component Value Date    WBC 23.04 (H) 04/26/2025    HGB 11.1 (L) 04/26/2025    HCT 33.9 (L) 04/26/2025    MCV 94.7 04/26/2025     (L) 04/26/2025       Lab Results   Component Value Date    GLUCOSE 40 (C) 04/26/2025    BUN 16 04/26/2025    CREATININE 0.57 04/26/2025    EGFRIFNONA 60 (L) 02/08/2022    EGFRIFAFRI >60 10/03/2022    BCR 28.1 (H) 04/26/2025    CO2 24.0 04/26/2025    CALCIUM 7.8 (L) 04/26/2025    ALBUMIN 3.3 (L) 04/25/2025    LABIL2 2.2 10/03/2022    AST 23 04/25/2025    ALT 45 (H) 04/25/2025     Procalcitonin 0.27    Microbiology:  4/25 BCx P x 2    Radiology:  Admission chest x-ray showed noes acute infiltrates.  Cardiomegaly.    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 Bili    There is certainly erythema and warmth in the right lower extremity.  Surprising that she would have developed cellulitis while on suppressive antibiotic therapy.  Will obtain Dopplers to rule out DVT.  For now continue vancomycin dosing per  pharmacy, will discontinue empiric Zosyn and start ceftriaxone from more kidney friendly regiment.  Antibiotic choice and duration to be determined    No evidence of resistant infection at this time continue standard isolation    I discussed the patient's findings and my recommendations with patient and nursing staff

## 2025-04-26 NOTE — THERAPY EVALUATION
Patient Name: Caitlyn Longoria  : 1934    MRN: [id number]                              Today's Date: 2025       Admit Date: 2025    Visit Dx:     ICD-10-CM ICD-9-CM   1. Sepsis without acute organ dysfunction, due to unspecified organism  A41.9 038.9     995.91   2. Cellulitis of right lower extremity  L03.115 682.6   3. Leukocytosis, unspecified type  D72.829 288.60   4. Hypokalemia  E87.6 276.8   5. Pedal edema  R60.0 782.3     Patient Active Problem List   Diagnosis    Neck and shoulder pain    Arthropathy of shoulder region    Carpal tunnel syndrome of left wrist    Chronic pain of both shoulders    Chronic left shoulder pain    Arthropathy of left shoulder    Dysarthria    Type 2 diabetes mellitus    Paroxysmal atrial fibrillation    HLD (hyperlipidemia)    Chronic bronchitis    Coronary artery disease    CVA (cerebral vascular accident)    HTN (hypertension)    CKD (chronic kidney disease), stage III    Chronic combined systolic (congestive) and diastolic (congestive) heart failure    Infection of prosthetic right knee joint    Stasis dermatitis of right lower extremity due to peripheral venous hypertension    Current use of long term anticoagulation    Morbid obesity    Acute respiratory failure with hypoxia    Rhinovirus    Asthma with acute exacerbation    Acute respiratory failure with hypoxemia    Viral pneumonia    Hypoxia    CLL (chronic lymphocytic leukemia)    Dyspnea    Anticoagulated on Coumadin    Osteoporotic compression fracture of spine    Pneumonia due to gram-negative bacteria    Pneumonia due to infectious organism    Bilateral carotid artery disease    Hypogammaglobulinemia    Hypogammaglobulinemia    Cellulitis of right lower extremity    Hypokalemia    Nocturnal hypoxia    COPD (chronic obstructive pulmonary disease)    Upper respiratory tract infection    COPD (chronic obstructive pulmonary disease)    Sick sinus syndrome    Anemia    Thrombocytopenia    Chronic HFrEF  (heart failure with reduced ejection fraction)    Cellulitis of left lower leg    Acute exacerbation of COPD with asthma    Leukocytosis    COPD exacerbation    Sepsis     Past Medical History:   Diagnosis Date    Aortic calcification     mild, on echo 12/17/2017    Aortic regurgitation     Trace    Asthma     Atrial fibrillation     CAD (coronary artery disease)     Carpal tunnel syndrome of left wrist     Chronic combined systolic and diastolic congestive heart failure     CKD (chronic kidney disease) stage 3, GFR 30-59 ml/min     COPD (chronic obstructive pulmonary disease)     Coronary artery disease involving native coronary artery of native heart with angina pectoris     Disc degeneration, lumbar     Diverticulosis     DM type 2 (diabetes mellitus, type 2)     Dyslipidemia     GERD (gastroesophageal reflux disease)     History of aneurysm     right femoral artery s/p LHC    History of blood transfusion     History of fracture     History of heart attack     History of vitamin D deficiency     Hyperlipidemia     Hypertension     Leukemia     Mild mitral regurgitation     Mitral annular calcification     12/8/2017- echo, moderate    Osteopenia     PAF (paroxysmal atrial fibrillation)     Peripheral neuropathy     Skin cancer     Left hand    Sleep apnea     bipap    SSS (sick sinus syndrome)     Stroke (cerebrum)     TIA (transient ischemic attack) 2017    Tricuspid regurgitation     Trace     Past Surgical History:   Procedure Laterality Date    BRONCHOSCOPY Bilateral 10/6/2020    Procedure: BRONCHOSCOPY with BILATERAL LUNG washings;  Surgeon: Juno Coburn MD;  Location: [facility] ENDOSCOPY;  Service: Pulmonary;  Laterality: Bilateral;  PRE: purulent bronchitis  POST: PURULENT BRONCHITIS    BRONCHOSCOPY Bilateral 10/9/2020    Procedure: BRONCHOSCOPY with washing;  Surgeon: Juno Coburn MD;  Location: [facility] ENDOSCOPY;  Service: Pulmonary;  Laterality: Bilateral;  pre/post - mucous plug      CARDIAC  CATHETERIZATION      CARDIAC ELECTROPHYSIOLOGY PROCEDURE N/A 2/7/2020    Procedure: PPM generator change - dual  medtronic;  Surgeon: Kiel Field MD;  Location: [facility] CATH INVASIVE LOCATION;  Service: Cardiology;  Laterality: N/A;    CHOLECYSTECTOMY      CORONARY STENT PLACEMENT      ENDOSCOPY N/A 9/14/2022    Procedure: ESOPHAGOGASTRODUODENOSCOPY with 54fr main dilatation;  Surgeon: Enio Cota MD;  Location: [facility] ENDOSCOPY;  Service: Gastroenterology;  Laterality: N/A;  pre - dysphagia  post - s/p dilatation, watermelon stomach    HERNIA REPAIR      hital hernia    HYSTERECTOMY      PACEMAKER IMPLANTATION      REPLACEMENT TOTAL KNEE Bilateral       General Information       Row Name 04/26/25 0950          Physical Therapy Time and Intention    Document Type evaluation  -     Mode of Treatment individual therapy;physical therapy  -       Row Name 04/26/25 0950          General Information    Patient Profile Reviewed yes  -SM     Prior Level of Function independent:  -     Existing Precautions/Restrictions fall  -       Row Name 04/26/25 0950          Living Environment    Current Living Arrangements independent living facility  -     People in Home alone;facility resident  ILF  -       Row Name 04/26/25 0950          Cognition    Orientation Status (Cognition) oriented x 4  -       Row Name 04/26/25 0950          Safety Issues/Impairments Affecting Functional Mobility    Impairments Affecting Function (Mobility) balance;endurance/activity tolerance;strength  -               User Key  (r) = Recorded By, (t) = Taken By, (c) = Cosigned By      Initials Name Provider Type     Evelyn Murillo PT Physical Therapist                   Mobility       Row Name 04/26/25 0950          Bed Mobility    Bed Mobility supine-sit  -     Supine-Sit [place] (Bed Mobility) minimum assist (75% patient effort);moderate assist (50% patient effort);verbal cues  -     Assistive Device (Bed  Mobility) head of bed elevated  -     Comment, (Bed Mobility) Increased time  -       Row Name 04/26/25 0950          Sit-Stand Transfer    Sit-Stand [place] (Transfers) maximum assist (25% patient effort);moderate assist (50% patient effort)  -     Assistive Device (Sit-Stand Transfers) walker, front-wheeled  -     Comment, (Sit-Stand Transfer) 4x from EOB and BSC  -       Row Name 04/26/25 0950          Gait/Stairs (Locomotion)    [place] Level (Gait) minimum assist (75% patient effort)  -     Assistive Device (Gait) walker, front-wheeled  -     Distance in Feet (Gait) 6  3ft 2x  -     Deviations/Abnormal Patterns (Gait) jeremi decreased;festinating/shuffling;gait speed decreased  -     Bilateral Gait Deviations forward flexed posture  -     Comment, (Gait/Stairs) Slow shuffled steps from EOB to BSC and to chair.  -               User Key  (r) = Recorded By, (t) = Taken By, (c) = Cosigned By      Initials Name Provider Type     Evelyn Murillo, CHERRY Physical Therapist                   Obj/Interventions       Row Name 04/26/25 0989          Range of Motion Comprehensive    General Range of Motion bilateral lower extremity ROM WFL  -       Row Name 04/26/25 0984          Strength Comprehensive (MMT)    General Manual Muscle Testing (MMT) Assessment lower extremity strength deficits identified  -     Comment, General Manual Muscle Testing (MMT) Assessment Generalized B LE weakness  -       Row Name 04/26/25 0969          Balance    Balance Assessment sitting static balance;sitting dynamic balance;standing static balance;standing dynamic balance  -     Static Sitting Balance standby assist  -     Dynamic Sitting Balance contact guard  -     Position, Sitting Balance sitting edge of bed  -     Static Standing Balance minimal assist  -     Dynamic Standing Balance minimal assist;moderate assist  -     Position/Device Used, Standing Balance supported;walker,  front-wheeled  -SM     Balance Interventions sitting;standing;sit to stand;supported;static;dynamic  -SM               User Key  (r) = Recorded By, (t) = Taken By, (c) = Cosigned By      Initials Name Provider Type     Evelyn Murillo PT Physical Therapist                   Goals/Plan       Row Name 04/26/25 0955          Bed Mobility Goal 1 (PT)    Activity/Assistive Device (Bed Mobility Goal 1, PT) bed mobility activities, all  -SM     [place] Level/Cues Needed (Bed Mobility Goal 1, PT) supervision required  -SM     Time Frame (Bed Mobility Goal 1, PT) 2 weeks  -SM       Row Name 04/26/25 0955          Transfer Goal 1 (PT)    Activity/Assistive Device (Transfer Goal 1, PT) sit-to-stand/stand-to-sit;bed-to-chair/chair-to-bed  -SM     [place] Level/Cues Needed (Transfer Goal 1, PT) standby assist  -SM     Time Frame (Transfer Goal 1, PT) 2 weeks  -SM       Row Name 04/26/25 0955          Gait Training Goal 1 (PT)    Activity/Assistive Device (Gait Training Goal 1, PT) gait (walking locomotion);walker, rolling  -SM     [place] Level (Gait Training Goal 1, PT) standby assist  -SM     Distance (Gait Training Goal 1, PT) 50ft  -SM     Time Frame (Gait Training Goal 1, PT) 2 weeks  -SM               User Key  (r) = Recorded By, (t) = Taken By, (c) = Cosigned By      Initials Name Provider Type     Evelyn Murillo PT Physical Therapist                   Clinical Impression       Row Name 04/26/25 0951          Pain    Pain Location extremity  -SM     Pain Side/Orientation right;lower  -SM     Pain Management Interventions exercise or physical activity utilized  -SM     Response to Pain Interventions activity participation with tolerable pain  -SM     Pre/Posttreatment Pain Comment Patient did not rate  -SM       Row Name 04/26/25 0951          Plan of Care Review    Plan of Care Reviewed With patient  -SM     Outcome Evaluation Patient is a 89 y.o female who presented to [facility] with LE swelling and  malaise. Patient AOx4 supine in bed upon arrival. Patient reports she lives at an ILF where she ambulates using a rollator. Patient reports she is independent at baseline with ADLs. Today patient require Bob-modA and increased time to sit up to EOB. Patient stood from EOB and BSC multiple times requiring maxA and VCs for hand placement. Once standing patient able to ambulated 3ft to BSC and to chair with rwx and Bob. Steps very slow and shuffled with patient fwd flexed. Patient demonstrates significant weakess and would continue to benefit from skilled PT intervention to address deficits in functional mobility. Recommend SNF at d/c. Acute PT will continue to monitor.  -       Row Name 04/26/25 0951          Therapy Assessment/Plan (PT)    Rehab Potential (PT) fair  -     Criteria for Skilled Interventions Met (PT) yes  -SM     Therapy Frequency (PT) 5 times/wk  -       Row Name 04/26/25 0951          Vital Signs    Pre Patient Position Supine  -     Intra Patient Position Standing  -SM     Post Patient Position Sitting  -       Row Name 04/26/25 0951          Positioning and Restraints    Pre-Treatment Position in bed  -SM     Post Treatment Position chair  -SM     In Chair notified nsg;reclined;call light within reach;encouraged to call for assist;exit alarm on  -               User Key  (r) = Recorded By, (t) = Taken By, (c) = Cosigned By      Initials Name Provider Type     Evelyn Murillo, PT Physical Therapist                   Outcome Measures       Row Name 04/26/25 0955 04/26/25 0829       How much help from another person do you currently need...    Turning from your back to your side while in flat bed without using bedrails? 3  -SM 3  -JH    Moving from lying on back to sitting on the side of a flat bed without bedrails? 2  -SM 3  -JH    Moving to and from a bed to a chair (including a wheelchair)? 2  -SM 2  -JH    Standing up from a chair using your arms (e.g., wheelchair, bedside  chair)? 2  - 2  -    Climbing 3-5 steps with a railing? 1  - 2  -    To walk in hospital room? 2  - 2  -JH    AM-PAC 6 Clicks Score (PT) 12  - 14  -    Highest Level of Mobility Goal 4 --> Transfer to chair/commode  - 4 --> Transfer to chair/commode  -      Row Name 04/26/25 0955          Functional Assessment    Outcome Measure Options AM-PAC 6 Clicks Basic Mobility (PT)  -               User Key  (r) = Recorded By, (t) = Taken By, (c) = Cosigned By      Initials Name Provider Type    Pricilla Magana RN Registered Nurse    Evelyn Kemp, CHERRY Physical Therapist                                 Physical Therapy Education       Title: PT OT SLP Therapies (Done)       Topic: Physical Therapy (Done)       Point: Mobility training (Done)       Learning Progress Summary            Patient Acceptance, E, VU,NR by  at 4/26/2025 0956                      Point: Home exercise program (Done)       Learning Progress Summary            Patient Acceptance, E, VU,NR by  at 4/26/2025 0956                      Point: Body mechanics (Done)       Learning Progress Summary            Patient Acceptance, E, VU,NR by  at 4/26/2025 0956                      Point: Precautions (Done)       Learning Progress Summary            Patient Acceptance, E, VU,NR by  at 4/26/2025 0956                                      User Key       Initials Effective Dates Name Provider Type Discipline     05/02/22 -  Evelyn Murillo, CHERRY Physical Therapist PT                  PT Recommendation and Plan     Outcome Evaluation: Patient is a 89 y.o female who presented to [facility] with LE swelling and malaise. Patient AOx4 supine in bed upon arrival. Patient reports she lives at an [facility] where she ambulates using a rollator. Patient reports she is independent at baseline with ADLs. Today patient require Bob-modA and increased time to sit up to EOB. Patient stood from EOB and BSC multiple times requiring maxA and VCs for hand  placement. Once standing patient able to ambulated 3ft to BSC and to chair with rwx and Bob. Steps very slow and shuffled with patient fwd flexed. Patient demonstrates significant weakess and would continue to benefit from skilled PT intervention to address deficits in functional mobility. Recommend SNF at d/c. Acute PT will continue to monitor.     Time Calculation:         PT Charges       Row Name 04/26/25 0956             Time Calculation    Start Time 0859  -SM      Stop Time 0930  -SM      Time Calculation (min) 31 min  -SM      PT Received On 04/26/25  -SM      PT - Next Appointment 04/28/25  -SM      PT Goal Re-Cert Due Date 05/10/25  -SM         Time Calculation- PT    Total Timed Code Minutes- PT 25 minute(s)  -SM         Timed Charges    67813 - PT Therapeutic Activity Minutes 25  -SM         Total Minutes    Timed Charges Total Minutes 25  -SM       Total Minutes 25  -SM                User Key  (r) = Recorded By, (t) = Taken By, (c) = Cosigned By      Initials Name Provider Type     Evelyn Murillo, PT Physical Therapist                  Therapy Charges for Today       Code Description Service Date Service Provider Modifiers Qty    [id number]  PT THERAPEUTIC ACT EA 15 MIN 4/26/2025 Evelyn Murillo, PT GP 2    [id number] HC PT EVAL MOD COMPLEXITY 3 4/26/2025 Evelyn Murillo, PT GP 1            PT G-Codes  Outcome Measure Options: AM-PAC 6 Clicks Basic Mobility (PT)  AM-PAC 6 Clicks Score (PT): 12  PT Discharge Summary  Anticipated Discharge Disposition (PT): skilled nursing facility    Evelyn Murillo PT  4/26/2025

## 2025-04-26 NOTE — PROGRESS NOTES
"[facility] Clinical Pharmacy Services: Vancomycin Monitoring Note    Caitlyn Longoria is a 89 y.o. female who is on day 2/5 of pharmacy to dose vancomycin for Empiric.    Previous Vancomycin Dose:   1000 mg IV every  24  hours    Updated Cultures and Sensitivities:   4/25/25: Blood Cx in process      Vitals/Labs  Ht: 160 cm (62.99\"); Wt: 81.2 kg (179 lb 0.2 oz)   Temp Readings from Last 1 Encounters:   04/26/25 98.4 °F (36.9 °C) (Oral)     Estimated Creatinine Clearance: 66.2 mL/min (by C-G formula based on SCr of 0.57 mg/dL).     Results from last 7 days   Lab Units 04/26/25  0604 04/26/25  0603 04/25/25  1237   CREATININE mg/dL 0.57  --  0.89   WBC 10*3/mm3  --  23.04* 25.16*     Assessment/Plan    Will adjust vancomycin dose from 1000 mg to 1250 mg q24h for a new predicted AUC of 405 mg/L.hr.  Next Level Date and Time: Vanc Trough on 4/28 at 1300  We will continue to monitor patient changes and renal function     Thank you for involving pharmacy in this patient's care. Please contact pharmacy with any questions or concerns.       Phong Bird, [facility]  Clinical Pharmacist         "

## 2025-04-26 NOTE — CONSULTS
[facility]    Reason for consult: CHARLENE    HPI:  Mrs. Longoria is a 89yo female with CHARLENE on Bipap, severe persistent asthma, hypogammaglobulinemia.  She follows with Dr. Coburn in our office I reviewed his last office note and download on last cipap reading.  She was admitted here yesterday for le extremity cellulits, she says that is improving some. She says her breathing is at baseline. She says she usually  uses bipap 14/11 with 2lpm but does not have her home unit.  She sometimes falls asleep in her chair instead of her bed.      Past Medical History:   Diagnosis Date    Aortic calcification     mild, on echo 12/17/2017    Aortic regurgitation     Trace    Asthma     Atrial fibrillation     CAD (coronary artery disease)     Carpal tunnel syndrome of left wrist     Chronic combined systolic and diastolic congestive heart failure     CKD (chronic kidney disease) stage 3, GFR 30-59 ml/min     COPD (chronic obstructive pulmonary disease)     Coronary artery disease involving native coronary artery of native heart with angina pectoris     Disc degeneration, lumbar     Diverticulosis     DM type 2 (diabetes mellitus, type 2)     Dyslipidemia     GERD (gastroesophageal reflux disease)     History of aneurysm     right femoral artery s/p LHC    History of blood transfusion     History of fracture     History of heart attack     History of vitamin D deficiency     Hyperlipidemia     Hypertension     Leukemia     Mild mitral regurgitation     Mitral annular calcification     12/8/2017- echo, moderate    Osteopenia     PAF (paroxysmal atrial fibrillation)     Peripheral neuropathy     Skin cancer     Left hand    Sleep apnea     bipap    SSS (sick sinus syndrome)     Stroke (cerebrum)     TIA (transient ischemic attack) 2017    Tricuspid regurgitation     Trace     Social History     Socioeconomic History    Marital status: Single   Tobacco Use    Smoking status: Never     Passive exposure: Never    Smokeless  tobacco: Never    Tobacco comments:     caffeine use- soda   Vaping Use    Vaping status: Never Used   Substance and Sexual Activity    Alcohol use: Never    Drug use: No    Sexual activity: Defer     Family History   Problem Relation Age of Onset    Heart disease Mother     Hypertension Mother     Colon polyps Mother     Heart disease Father     Hypertension Father     Stroke Father     Hypertension Brother     Heart disease Brother     Diabetes Niece     Colon cancer Sister     Hypertension Sister     Hypertension Daughter     Hypertension Son     Hypertension Maternal Aunt     Hypertension Maternal Grandmother     Hypertension Maternal Grandfather     Hypertension Paternal Grandmother     Hypertension Paternal Grandfather      MEDS: reviewed as per chart  AlL: list of 18 reviewed as per chart  ROS; 12 point negative except as in HPI    Vital Sign Min/Max for last 24 hours  Temp  Min: 97.2 °F (36.2 °C)  Max: 98.4 °F (36.9 °C)   BP  Min: 95/61  Max: 108/58   Pulse  Min: 69  Max: 82   Resp  Min: 16  Max: 18   SpO2  Min: 95 %  Max: 100 %   No data recorded   Weight  Min: 81.2 kg (179 lb 0.2 oz)  Max: 81.2 kg (179 lb 0.2 oz)     GEN:   appears ill, obese, A&O x3  HEENT: PERRL, EOMI, no icterus, mmm, no JVD, trachea midline, neck supple  CHEST: CTA bilat, no wheezes, no crackles, no use of accessory muscles  CV: RRR, no m/g/r  ABD: soft, nt, nd +bs, no hepatosplenomegaly  EXT: no c/c/ +2 edema bilaterally le with erythema and warmth more on the right le  SKIN: no rashes, no xanthomas, nl turgor, warm, dry  LYMPH: no palpable cervical or supraclavicular lymphadenopathy  NEURO: CN 2-12 intact and symmetric bilaterally  PSYCH: nl affect, nl orientation, nl judgment, nl mood  MSK: some kyphosis, 5/5 strength ue and le bilaterally    Labs: 4/26: reviewed;  Bun 16  Cr 0.57  Bicarb 24  Wbc 23  Hgb 11  Plts 102    A/P:  CHARLENE -- continue home bipap 14/11 with 2lpm  Severe persistent asthma -- no acute exacerbation continue  bronchodilators.  Hypogammaglobulenmia  Cellulitis

## 2025-04-26 NOTE — PLAN OF CARE
Goal Outcome Evaluation:  Plan of Care Reviewed With: patient           Outcome Evaluation: Patient is a 89 y.o female who presented to [facility] with LE swelling and malaise. Patient AOx4 supine in bed upon arrival. Patient reports she lives at an ILF where she ambulates using a rollator. Patient reports she is independent at baseline with ADLs. Today patient require Bob-modA and increased time to sit up to EOB. Patient stood from EOB and BSC multiple times requiring maxA and VCs for hand placement. Once standing patient able to ambulated 3ft to BSC and to chair with rwx and Bob. Steps very slow and shuffled with patient fwd flexed. Patient demonstrates significant weakess and would continue to benefit from skilled PT intervention to address deficits in functional mobility. Recommend SNF at d/c. Acute PT will continue to monitor.    Anticipated Discharge Disposition (PT): skilled nursing facility

## 2025-04-26 NOTE — PROGRESS NOTES
[facility] Clinical Pharmacy Services: Warfarin Dosing/Monitoring Consult    Caitlyn Longoria is a 89 y.o. female, estimated creatinine clearance is 41.6 mL/min (by C-G formula based on SCr of 0.89 mg/dL). weighing 78.2 kg (172 lb 8 oz).    Results from last 7 days   Lab Units 04/25/25  1237 04/21/25  0000   INR  2.08* 3.60   APTT seconds 60.5*  --    HEMOGLOBIN g/dL 11.9*  --    HEMATOCRIT % 36.8  --    PLATELETS 10*3/mm3 127*  --      Prior to admission anticoagulation: warfarin 2 mg daily (per anticoag visit on 4/21)    Hospital Anticoagulation:  Consulting provider: Dr. Garcia  Start date: 4/25  Indication: A Fib - requiring full anticoagulation  Target INR: 2 - 3  Expected duration: lifelong   Bridge Therapy: No      Potential food or drug interactions: Zosyn may enhance the anticoagulant effect of warfarin    Education complete?/Date: N/A; home medication    Assessment/Plan:  INR today is 2.08 which is within goal. Pt was recently instructed to hold warfarin for 2 days and restart on 4/24 due to supratherapeutic INR (was on prednisone and a zpack) so suspect that's why her INR is on the lower side of goal, but I expect it to rise. Plan to resume home regimen  Dose: 2 mg daily  Monitor for any signs or symptoms of bleeding  Follow up daily INRs and dose adjustments    Pharmacy will continue to follow until discharge or discontinuation of warfarin.     Gosia Gannon [facility]  Clinical Pharmacist

## 2025-04-27 LAB
ALBUMIN SERPL-MCNC: 2.5 G/DL (ref 3.5–5.2)
ALP SERPL-CCNC: 129 U/L (ref 39–117)
ALT SERPL W P-5'-P-CCNC: 56 U/L (ref 1–33)
ANION GAP SERPL CALCULATED.3IONS-SCNC: 7.5 MMOL/L (ref 5–15)
AST SERPL-CCNC: 89 U/L (ref 1–32)
BILIRUB CONJ SERPL-MCNC: 0.5 MG/DL (ref 0–0.3)
BILIRUB INDIRECT SERPL-MCNC: 0.3 MG/DL
BILIRUB SERPL-MCNC: 0.8 MG/DL (ref 0–1.2)
BUN SERPL-MCNC: 14 MG/DL (ref 8–23)
BUN/CREAT SERPL: 19.7 (ref 7–25)
CALCIUM SPEC-SCNC: 7.9 MG/DL (ref 8.2–9.6)
CHLORIDE SERPL-SCNC: 111 MMOL/L (ref 98–107)
CO2 SERPL-SCNC: 22.5 MMOL/L (ref 22–29)
CREAT SERPL-MCNC: 0.71 MG/DL (ref 0.57–1)
DEPRECATED RDW RBC AUTO: 47.4 FL (ref 37–54)
EGFRCR SERPLBLD CKD-EPI 2021: 80.9 ML/MIN/1.73
ERYTHROCYTE [DISTWIDTH] IN BLOOD BY AUTOMATED COUNT: 13.1 % (ref 12.3–15.4)
GLUCOSE SERPL-MCNC: 102 MG/DL (ref 65–99)
HCT VFR BLD AUTO: 32.7 % (ref 34–46.6)
HGB BLD-MCNC: 10.4 G/DL (ref 12–15.9)
INR PPP: 1.89 (ref 0.9–1.1)
MCH RBC QN AUTO: 30.7 PG (ref 26.6–33)
MCHC RBC AUTO-ENTMCNC: 31.8 G/DL (ref 31.5–35.7)
MCV RBC AUTO: 96.5 FL (ref 79–97)
PLATELET # BLD AUTO: 97 10*3/MM3 (ref 140–450)
PMV BLD AUTO: 10.4 FL (ref 6–12)
POTASSIUM SERPL-SCNC: 4.2 MMOL/L (ref 3.5–5.2)
PROT SERPL-MCNC: 4.9 G/DL (ref 6–8.5)
PROTHROMBIN TIME: 21.8 SECONDS (ref 11.7–14.2)
RBC # BLD AUTO: 3.39 10*6/MM3 (ref 3.77–5.28)
SODIUM SERPL-SCNC: 141 MMOL/L (ref 136–145)
WBC NRBC COR # BLD AUTO: 17.66 10*3/MM3 (ref 3.4–10.8)

## 2025-04-27 PROCEDURE — 97535 SELF CARE MNGMENT TRAINING: CPT

## 2025-04-27 PROCEDURE — 25810000003 SODIUM CHLORIDE 0.9 % SOLUTION 250 ML FLEX CONT: Performed by: INTERNAL MEDICINE

## 2025-04-27 PROCEDURE — 94799 UNLISTED PULMONARY SVC/PX: CPT

## 2025-04-27 PROCEDURE — 80048 BASIC METABOLIC PNL TOTAL CA: CPT | Performed by: STUDENT IN AN ORGANIZED HEALTH CARE EDUCATION/TRAINING PROGRAM

## 2025-04-27 PROCEDURE — 99232 SBSQ HOSP IP/OBS MODERATE 35: CPT | Performed by: INTERNAL MEDICINE

## 2025-04-27 PROCEDURE — 25010000002 CEFTRIAXONE PER 250 MG: Performed by: STUDENT IN AN ORGANIZED HEALTH CARE EDUCATION/TRAINING PROGRAM

## 2025-04-27 PROCEDURE — 80076 HEPATIC FUNCTION PANEL: CPT | Performed by: INTERNAL MEDICINE

## 2025-04-27 PROCEDURE — 94761 N-INVAS EAR/PLS OXIMETRY MLT: CPT

## 2025-04-27 PROCEDURE — 97530 THERAPEUTIC ACTIVITIES: CPT

## 2025-04-27 PROCEDURE — 94660 CPAP INITIATION&MGMT: CPT

## 2025-04-27 PROCEDURE — 94664 DEMO&/EVAL PT USE INHALER: CPT

## 2025-04-27 PROCEDURE — 97166 OT EVAL MOD COMPLEX 45 MIN: CPT

## 2025-04-27 PROCEDURE — 85027 COMPLETE CBC AUTOMATED: CPT | Performed by: STUDENT IN AN ORGANIZED HEALTH CARE EDUCATION/TRAINING PROGRAM

## 2025-04-27 PROCEDURE — 85610 PROTHROMBIN TIME: CPT | Performed by: STUDENT IN AN ORGANIZED HEALTH CARE EDUCATION/TRAINING PROGRAM

## 2025-04-27 PROCEDURE — 25010000002 VANCOMYCIN HCL 1.25 G RECONSTITUTED SOLUTION 1 EACH VIAL: Performed by: INTERNAL MEDICINE

## 2025-04-27 RX ORDER — GUAIFENESIN 600 MG/1
1200 TABLET, EXTENDED RELEASE ORAL EVERY 12 HOURS SCHEDULED
Status: DISCONTINUED | OUTPATIENT
Start: 2025-04-27 | End: 2025-05-05 | Stop reason: HOSPADM

## 2025-04-27 RX ADMIN — OXYBUTYNIN CHLORIDE 10 MG: 10 TABLET, EXTENDED RELEASE ORAL at 21:37

## 2025-04-27 RX ADMIN — Medication 4 ML: at 20:47

## 2025-04-27 RX ADMIN — CETIRIZINE HYDROCHLORIDE 10 MG: 10 TABLET ORAL at 08:17

## 2025-04-27 RX ADMIN — BUDESONIDE 0.5 MG: 0.5 INHALANT RESPIRATORY (INHALATION) at 06:29

## 2025-04-27 RX ADMIN — CETIRIZINE HYDROCHLORIDE 10 MG: 10 TABLET ORAL at 21:37

## 2025-04-27 RX ADMIN — BUDESONIDE 0.5 MG: 0.5 INHALANT RESPIRATORY (INHALATION) at 20:47

## 2025-04-27 RX ADMIN — ROSUVASTATIN CALCIUM 20 MG: 10 TABLET, FILM COATED ORAL at 21:37

## 2025-04-27 RX ADMIN — Medication 4 ML: at 06:30

## 2025-04-27 RX ADMIN — VANCOMYCIN HYDROCHLORIDE 1250 MG: 1.25 INJECTION, POWDER, LYOPHILIZED, FOR SOLUTION INTRAVENOUS at 14:52

## 2025-04-27 RX ADMIN — Medication 1000 UNITS: at 08:17

## 2025-04-27 RX ADMIN — ANTI-FUNGAL POWDER MICONAZOLE NITRATE TALC FREE 1 APPLICATION: 1.42 POWDER TOPICAL at 21:59

## 2025-04-27 RX ADMIN — IPRATROPIUM BROMIDE AND ALBUTEROL SULFATE 3 ML: .5; 3 SOLUTION RESPIRATORY (INHALATION) at 11:23

## 2025-04-27 RX ADMIN — MONTELUKAST 10 MG: 10 TABLET, FILM COATED ORAL at 21:37

## 2025-04-27 RX ADMIN — SODIUM CHLORIDE 2000 MG: 9 INJECTION, SOLUTION INTRAVENOUS at 16:58

## 2025-04-27 RX ADMIN — IPRATROPIUM BROMIDE AND ALBUTEROL SULFATE 3 ML: .5; 3 SOLUTION RESPIRATORY (INHALATION) at 20:47

## 2025-04-27 RX ADMIN — FLUTICASONE PROPIONATE 1 SPRAY: 50 SPRAY, METERED NASAL at 08:17

## 2025-04-27 RX ADMIN — CALCIUM CARBONATE-VITAMIN D TAB 500 MG-200 UNIT 1 TABLET: 500-200 TAB at 21:37

## 2025-04-27 RX ADMIN — WARFARIN 2 MG: 2 TABLET ORAL at 16:58

## 2025-04-27 RX ADMIN — CALCIUM CARBONATE-VITAMIN D TAB 500 MG-200 UNIT 1 TABLET: 500-200 TAB at 08:17

## 2025-04-27 RX ADMIN — OXYBUTYNIN CHLORIDE 10 MG: 10 TABLET, EXTENDED RELEASE ORAL at 08:17

## 2025-04-27 RX ADMIN — GUAIFENESIN 1200 MG: 600 TABLET, EXTENDED RELEASE ORAL at 17:30

## 2025-04-27 RX ADMIN — IPRATROPIUM BROMIDE AND ALBUTEROL SULFATE 3 ML: .5; 3 SOLUTION RESPIRATORY (INHALATION) at 06:27

## 2025-04-27 RX ADMIN — ALBUTEROL SULFATE 2.5 MG: 2.5 SOLUTION RESPIRATORY (INHALATION) at 15:45

## 2025-04-27 RX ADMIN — ANTI-FUNGAL POWDER MICONAZOLE NITRATE TALC FREE 1 APPLICATION: 1.42 POWDER TOPICAL at 08:17

## 2025-04-27 NOTE — THERAPY EVALUATION
Patient Name: Caitlyn Longoria  : 1934    MRN: [id number]                              Today's Date: 2025       Admit Date: 2025    Visit Dx:     ICD-10-CM ICD-9-CM   1. Sepsis without acute organ dysfunction, due to unspecified organism  A41.9 038.9     995.91   2. Cellulitis of right lower extremity  L03.115 682.6   3. Leukocytosis, unspecified type  D72.829 288.60   4. Hypokalemia  E87.6 276.8   5. Pedal edema  R60.0 782.3     Patient Active Problem List   Diagnosis    Neck and shoulder pain    Arthropathy of shoulder region    Carpal tunnel syndrome of left wrist    Chronic pain of both shoulders    Chronic left shoulder pain    Arthropathy of left shoulder    Dysarthria    Type 2 diabetes mellitus    Paroxysmal atrial fibrillation    HLD (hyperlipidemia)    Chronic bronchitis    Coronary artery disease    CVA (cerebral vascular accident)    HTN (hypertension)    CKD (chronic kidney disease), stage III    Chronic combined systolic (congestive) and diastolic (congestive) heart failure    Infection of prosthetic right knee joint    Stasis dermatitis of right lower extremity due to peripheral venous hypertension    Current use of long term anticoagulation    Morbid obesity    Acute respiratory failure with hypoxia    Rhinovirus    Asthma with acute exacerbation    Acute respiratory failure with hypoxemia    Viral pneumonia    Hypoxia    CLL (chronic lymphocytic leukemia)    Dyspnea    Anticoagulated on Coumadin    Osteoporotic compression fracture of spine    Pneumonia due to gram-negative bacteria    Pneumonia due to infectious organism    Bilateral carotid artery disease    Hypogammaglobulinemia    Hypogammaglobulinemia    Cellulitis of right lower extremity    Hypokalemia    Nocturnal hypoxia    COPD (chronic obstructive pulmonary disease)    Upper respiratory tract infection    COPD (chronic obstructive pulmonary disease)    Sick sinus syndrome    Anemia    Thrombocytopenia    Chronic HFrEF  (heart failure with reduced ejection fraction)    Cellulitis of left lower leg    Acute exacerbation of COPD with asthma    Leukocytosis    COPD exacerbation     Past Medical History:   Diagnosis Date    Aortic calcification     mild, on echo 12/17/2017    Aortic regurgitation     Trace    Asthma     Atrial fibrillation     CAD (coronary artery disease)     Carpal tunnel syndrome of left wrist     Chronic combined systolic and diastolic congestive heart failure     CKD (chronic kidney disease) stage 3, GFR 30-59 ml/min     COPD (chronic obstructive pulmonary disease)     Coronary artery disease involving native coronary artery of native heart with angina pectoris     Disc degeneration, lumbar     Diverticulosis     DM type 2 (diabetes mellitus, type 2)     Dyslipidemia     GERD (gastroesophageal reflux disease)     History of aneurysm     right femoral artery s/p LHC    History of blood transfusion     History of fracture     History of heart attack     History of vitamin D deficiency     Hyperlipidemia     Hypertension     Leukemia     Mild mitral regurgitation     Mitral annular calcification     12/8/2017- echo, moderate    Osteopenia     PAF (paroxysmal atrial fibrillation)     Peripheral neuropathy     Skin cancer     Left hand    Sleep apnea     bipap    SSS (sick sinus syndrome)     Stroke (cerebrum)     TIA (transient ischemic attack) 2017    Tricuspid regurgitation     Trace     Past Surgical History:   Procedure Laterality Date    BRONCHOSCOPY Bilateral 10/6/2020    Procedure: BRONCHOSCOPY with BILATERAL LUNG washings;  Surgeon: Juno Coburn MD;  Location: [facility] ENDOSCOPY;  Service: Pulmonary;  Laterality: Bilateral;  PRE: purulent bronchitis  POST: PURULENT BRONCHITIS    BRONCHOSCOPY Bilateral 10/9/2020    Procedure: BRONCHOSCOPY with washing;  Surgeon: Juno Coburn MD;  Location: [facility] ENDOSCOPY;  Service: Pulmonary;  Laterality: Bilateral;  pre/post - mucous plug      CARDIAC CATHETERIZATION       CARDIAC ELECTROPHYSIOLOGY PROCEDURE N/A 2/7/2020    Procedure: PPM generator change - dual  medtronic;  Surgeon: Kiel Field MD;  Location: [facility] CATH INVASIVE LOCATION;  Service: Cardiology;  Laterality: N/A;    CHOLECYSTECTOMY      CORONARY STENT PLACEMENT      ENDOSCOPY N/A 9/14/2022    Procedure: ESOPHAGOGASTRODUODENOSCOPY with 54fr main dilatation;  Surgeon: Enio Cota MD;  Location: [facility] ENDOSCOPY;  Service: Gastroenterology;  Laterality: N/A;  pre - dysphagia  post - s/p dilatation, watermelon stomach    HERNIA REPAIR      hital hernia    HYSTERECTOMY      PACEMAKER IMPLANTATION      REPLACEMENT TOTAL KNEE Bilateral       General Information       Row Name 04/27/25 1458          OT Time and Intention    Document Type evaluation  -RD     Mode of Treatment occupational therapy  -RD     Patient Effort good  -RD       Row Name 04/27/25 1458          General Information    Patient Profile Reviewed yes  -RD     Prior Level of Function independent:;ADL's  -RD     Existing Precautions/Restrictions fall  -RD       Row Name 04/27/25 1458          Living Environment    Current Living Arrangements independent living facility  -RD     People in Home alone;facility resident  ILF  -RD       Row Name 04/27/25 1458          Cognition    Orientation Status (Cognition) oriented x 4  -RD       Row Name 04/27/25 1458          Safety Issues/Impairments Affecting Functional Mobility    Impairments Affecting Function (Mobility) balance;endurance/activity tolerance;strength;range of motion (ROM)  -RD               User Key  (r) = Recorded By, (t) = Taken By, (c) = Cosigned By      Initials Name Provider Type    RD Malgorzata North OT Occupational Therapist                     Mobility/ADL's       Row Name 04/27/25 1459          Bed Mobility    Bed Mobility supine-sit  -RD     Supine-Sit [place] (Bed Mobility) minimum assist (75% patient effort);moderate assist (50% patient effort);verbal cues  -RD      Assistive Device (Bed Mobility) head of bed elevated;bed rails  -RD       Row Name 04/27/25 1459          Transfers    Transfers sit-stand transfer;bed-chair transfer  -RD       Row Name 04/27/25 1459          Bed-Chair Transfer    Bed-Chair [place] (Transfers) moderate assist (50% patient effort);2 person assist;verbal cues  -RD     Comment, (Bed-Chair Transfer) stand pivot from EOB > chair  -RD       Row Name 04/27/25 1459          Sit-Stand Transfer    Sit-Stand [place] (Transfers) maximum assist (25% patient effort);moderate assist (50% patient effort);verbal cues  -RD     Assistive Device (Sit-Stand Transfers) walker, front-wheeled  -RD     Comment, (Sit-Stand Transfer) multiple STS trials completed this date in order to gain full upright standing posture- pt is only able to full  upright position on 4th trial but quickly needs to sit back down  -RD       Row Name 04/27/25 1459          Activities of Daily Living    BADL Assessment/Intervention lower body dressing;grooming  -RD       Row Name 04/27/25 1459          Lower Body Dressing Assessment/Training    [place] Level (Lower Body Dressing) lower body dressing skills;doff;don;dependent (less than 25% patient effort)  -RD     Position (Lower Body Dressing) edge of bed sitting  -RD       Row Name 04/27/25 1459          Grooming Assessment/Training    [place] Level (Grooming) grooming skills;set up  -RD               User Key  (r) = Recorded By, (t) = Taken By, (c) = Cosigned By      Initials Name Provider Type    Malgorzata Parson OT Occupational Therapist                   Obj/Interventions       Row Name 04/27/25 1541          Range of Motion Comprehensive    Comment, General Range of Motion B UE AROM grossly approx. 90% flexion at shoulder level d/t pt reports of arthitis  -RD       Row Name 04/27/25 8081          Strength Comprehensive (MMT)    Comment, General Manual Muscle Testing (MMT) Assessment B UE MMT: grossly  approx. 2+/5  -RD       Row Name 04/27/25 1544          Motor Skills    Motor Skills functional endurance  -RD     Functional Endurance poo +  -RD               User Key  (r) = Recorded By, (t) = Taken By, (c) = Cosigned By      Initials Name Provider Type    Malgorzata Parson, OT Occupational Therapist                   Goals/Plan       Row Name 04/27/25 1545          Transfer Goal 1 (OT)    Activity/Assistive Device (Transfer Goal 1, OT) toilet  -RD     [place] Level/Cues Needed (Transfer Goal 1, OT) minimum assist (75% or more patient effort)  -RD     Time Frame (Transfer Goal 1, OT) long term goal (LTG)  -RD     Progress/Outcome (Transfer Goal 1, OT) goal ongoing  -RD       Row Name 04/27/25 1545          Dressing Goal 1 (OT)    Activity/Device (Dressing Goal 1, OT) dressing skills, all  -RD     [place]/Cues Needed (Dressing Goal 1, OT) minimum assist (75% or more patient effort)  -RD     Time Frame (Dressing Goal 1, OT) long term goal (LTG)  -RD     Progress/Outcome (Dressing Goal 1, OT) goal ongoing  -RD       Row Name 04/27/25 1545          Toileting Goal 1 (OT)    Activity/Device (Toileting Goal 1, OT) toileting skills, all  -RD     [place] Level/Cues Needed (Toileting Goal 1, OT) minimum assist (75% or more patient effort)  -RD     Time Frame (Toileting Goal 1, OT) long term goal (LTG)  -RD     Progress/Outcome (Toileting Goal 1, OT) goal ongoing  -RD       Row Name 04/27/25 1545          Problem Specific Goal 1 (OT)    Problem Specific Goal 1 (OT) Pt will increase functional endurance to approx. fair in order to increase indep and safety w/ ADLs  -RD     Time Frame (Problem Specific Goal 1, OT) long term goal (LTG)  -RD     Progress/Outcome (Problem Specific Goal 1, OT) goal ongoing  -RD       Row Name 04/27/25 1543          Therapy Assessment/Plan (OT)    Planned Therapy Interventions (OT) activity tolerance training;BADL retraining;functional balance retraining;patient/caregiver  education/training;ROM/therapeutic exercise;strengthening exercise;transfer/mobility retraining  -RD               User Key  (r) = Recorded By, (t) = Taken By, (c) = Cosigned By      Initials Name Provider Type    RD Malgorzata North, OT Occupational Therapist                   Clinical Impression       Row Name 04/27/25 1542          Pain Assessment    Pain Management Interventions activity modification encouraged  -RD     Response to Pain Interventions activity participation with tolerable pain  -RD     Pre/Posttreatment Pain Comment pt did not rate, but c/o B LE pain  -RD       Row Name 04/27/25 9089          Plan of Care Review    Plan of Care Reviewed With patient  -RD     Progress improving  -RD     Outcome Evaluation Pt is a 89 year old female admitted d/t malaise and leg pain w/ swelling. Pt reports he lives in an ILF where she reports she was indep w/ ADLs at [facility]. Pt presents to OT eval w/ significant generalized weakness, decreased activity tolerance, balance, and overall decreased indep and safety w/ ADLs and functional mobility. Pt able to complete bed mobility w/ min/mod A, STS transfer w/ max A (x4 attempts to reach full standing position), and ultimately required mod A x2 for stand pivot transfer from EOB > chair. Pt requires total A for LB Dressing and s/u for grooming. Pt may benefit from skilled OT services to address deficits and maximize indep and safety w/ ADLs and functional mobility. Anticipate pt will need SNF at discharge.  -RD       Row Name 04/27/25 2156          Therapy Assessment/Plan (OT)    Rehab Potential (OT) good  -RD     Criteria for Skilled Therapeutic Interventions Met (OT) yes;meets criteria;skilled treatment is necessary  -RD     Therapy Frequency (OT) 5 times/wk  -RD       Row Name 04/27/25 1546          Therapy Plan Review/Discharge Plan (OT)    Anticipated Discharge Disposition (OT) skilled nursing facility  -RD       Row Name 04/27/25 5604          Vital Signs    O2  Delivery Pre Treatment room air  -RD     O2 Delivery Intra Treatment room air  -RD     O2 Delivery Post Treatment room air  -RD       Row Name 04/27/25 1548          Positioning and Restraints    Pre-Treatment Position in bed  -RD     Post Treatment Position chair  -RD     In Chair reclined;call light within reach;encouraged to call for assist;exit alarm on;notified nsg;with other staff  w/ respiratory therapy  -RD               User Key  (r) = Recorded By, (t) = Taken By, (c) = Cosigned By      Initials Name Provider Type    RD Malgorzata North, OT Occupational Therapist                   Outcome Measures       Row Name 04/27/25 1547          How much help from another is currently needed...    Putting on and taking off regular lower body clothing? 2  -RD     Bathing (including washing, rinsing, and drying) 2  -RD     Toileting (which includes using toilet bed pan or urinal) 2  -RD     Putting on and taking off regular upper body clothing 2  -RD     Taking care of personal grooming (such as brushing teeth) 3  -RD     Eating meals 3  -RD     AM-PAC 6 Clicks Score (OT) 14  -RD       Row Name 04/27/25 0817          How much help from another person do you currently need...    Turning from your back to your side while in flat bed without using bedrails? 3  -JH     Moving from lying on back to sitting on the side of a flat bed without bedrails? 3  -JH     Moving to and from a bed to a chair (including a wheelchair)? 2  -JH     Standing up from a chair using your arms (e.g., wheelchair, bedside chair)? 2  -JH     Climbing 3-5 steps with a railing? 1  -JH     To walk in hospital room? 2  -JH     AM-PAC 6 Clicks Score (PT) 13  -JH     Highest Level of Mobility Goal 4 --> Transfer to chair/commode  -       Row Name 04/27/25 1547          Functional Assessment    Outcome Measure Options AM-PAC 6 Clicks Daily Activity (OT)  -RD               User Key  (r) = Recorded By, (t) = Taken By, (c) = Cosigned By      Initials  Name Provider Type    Pricilla Magana, RN Registered Nurse    Malgorzata Parson, OT Occupational Therapist                    Occupational Therapy Education       Title: PT OT SLP Therapies (In Progress)       Topic: Occupational Therapy (In Progress)       Point: ADL training (Done)       Learning Progress Summary            Patient Acceptance, EILEEN, VU by TONI at 4/27/2025 1898    Comment: OT educ on OT role in therapeutic process and pt's POC.                                      User Key       Initials Effective Dates Name Provider Type Discipline    TONI 06/16/21 -  Malgorzata North OT Occupational Therapist OT                  OT Recommendation and Plan  Planned Therapy Interventions (OT): activity tolerance training, BADL retraining, functional balance retraining, patient/caregiver education/training, ROM/therapeutic exercise, strengthening exercise, transfer/mobility retraining  Therapy Frequency (OT): 5 times/wk  Plan of Care Review  Plan of Care Reviewed With: patient  Progress: improving  Outcome Evaluation: Pt is a 89 year old female admitted d/t malaise and leg pain w/ swelling. Pt reports he lives in an ILF where she reports she was indep w/ ADLs at [facility]. Pt presents to OT eval w/ significant generalized weakness, decreased activity tolerance, balance, and overall decreased indep and safety w/ ADLs and functional mobility. Pt able to complete bed mobility w/ min/mod A, STS transfer w/ max A (x4 attempts to reach full standing position), and ultimately required mod A x2 for stand pivot transfer from EOB > chair. Pt requires total A for LB Dressing and s/u for grooming. Pt may benefit from skilled OT services to address deficits and maximize indep and safety w/ ADLs and functional mobility. Anticipate pt will need SNF at discharge.     Time Calculation:   Evaluation Complexity (OT)  Assessment, Occupational Performance/Identification of Deficit Complexity: 3-5 performance deficits  Clinical Decision  Making Complexity (OT): detailed assessment/moderate complexity  Overall Complexity of Evaluation (OT): moderate complexity     Time Calculation- OT       Row Name 04/27/25 1552             Time Calculation- OT    OT Start Time 1100  -RD      OT Stop Time 1131  -RD      OT Time Calculation (min) 31 min  -RD      Total Timed Code Minutes- OT 25 minute(s)  -RD      OT Received On 04/27/25  -RD      OT - Next Appointment 04/28/25  -RD      OT Goal Re-Cert Due Date 05/11/25  -RD         Timed Charges    07175 - OT Therapeutic Activity Minutes 15  -RD      97913 - OT Self Care/Mgmt Minutes 10  -RD         Untimed Charges    OT Eval/Re-eval Minutes 5  -RD         Total Minutes    Timed Charges Total Minutes 25  -RD      Untimed Charges Total Minutes 5  -RD       Total Minutes 30  -RD                User Key  (r) = Recorded By, (t) = Taken By, (c) = Cosigned By      Initials Name Provider Type    RD Malgorzata North, OT Occupational Therapist                  Therapy Charges for Today       Code Description Service Date Service Provider Modifiers Qty    24463859442 HC OT THERAPEUTIC ACT EA 15 MIN 4/27/2025 Malgorzata North, OT GO 1    03505366894 HC OT SELF CARE/MGMT/TRAIN EA 15 MIN 4/27/2025 Malgorzata North OT GO 1    82000484373 HC OT EVAL MOD COMPLEXITY 3 4/27/2025 Malgorzata North OT GO 1                 Malgorzata North OT  4/27/2025

## 2025-04-27 NOTE — PROGRESS NOTES
[facility] Clinical Pharmacy Services: Warfarin Dosing/Monitoring Consult    Caitlyn Longoria is a 89 y.o. female, estimated creatinine clearance is 54 mL/min (by C-G formula based on SCr of 0.71 mg/dL). weighing 83.6 kg (184 lb 4.9 oz).    Results from last 7 days   Lab Units 04/27/25  1224 04/27/25  0336 04/26/25  0603 04/26/25  0552 04/25/25  1237 04/21/25  0000   INR  1.89*  --   --  1.77* 2.08* 3.60   APTT seconds  --   --   --   --  60.5*  --    HEMOGLOBIN g/dL  --  10.4* 11.1*  --  11.9*  --    HEMATOCRIT %  --  32.7* 33.9*  --  36.8  --    PLATELETS 10*3/mm3  --  97* 102*  --  127*  --      Prior to admission anticoagulation: warfarin 2 mg daily (per anticoag visit on 4/21)     Hospital Anticoagulation:  Consulting provider: Dr. Garcia  Start date: 4/25  Indication: A Fib - requiring full anticoagulation  Target INR: 2 - 3  Expected duration: lifelong   Bridge Therapy: No     Potential food or drug interactions:   - Pip/Tazo- may enhance anticoagulant effect of warfarin (4/25- 4/27)  - Ceftriaxone- may enhance anticoagulant effect of warfarin (4/27- )  - Rosuvastatin- may enhance the anticoagulant effect of warfarin (home med)    Education complete?/Date: N/A; home medication    Assessment/Plan:  Antibiotics were switched from Zosyn to ceftriaxone (and vanc). INR increased from 1.77 ->1.89 today. Albumin and platelets (97) still low. Will resume her home regimen of 2 mg qd today but would continue to monitor INR closely given antibiotics/platelets.   Monitor for any signs or symptoms of bleeding  Follow up daily INRs and dose adjustments    Pharmacy will continue to follow until discharge or discontinuation of warfarin.     Phong Bird [facility]  Clinical Pharmacist

## 2025-04-27 NOTE — PLAN OF CARE
Goal Outcome Evaluation:  Plan of Care Reviewed With: patient        Progress: improving  Outcome Evaluation: No c/o pain, Up in chair and BSC. BM x2 today. Continues on IV vanc and rocephin. Dressings change to coccyx and RLE. Patient c/o soa and congestion. PRN duoneb given, flutter valve given by RT. Patient declined being placed on bipap at the time. Mucinex ordered.

## 2025-04-27 NOTE — PROGRESS NOTES
LOS: 2 days     Chief Complaint: Right lower extremity cellulitis    Interval History: Afebrile, no new complaints or events.  States she feels a little bit better.  Denies diarrhea vomiting or rash    Vital Signs  Temp:  [97.2 °F (36.2 °C)-98.8 °F (37.1 °C)] 98.1 °F (36.7 °C)  Heart Rate:  [69-80] 80  Resp:  [16-22] 20  BP: ()/(58-65) 116/65    Physical Exam:  General: In no acute distress  Cardiovascular: RRR  Respiratory: Breathing comfortably on room air  GI: Soft, NT/ND,  Skin: No rashes   Extremities: Right lower extremity with more of hemorrhagic changes swelling persists    Antibiotics:  Vancomycin dosing per pharmacy  Ceftriaxone 2 g IV every 24 hours     Results Review:    Lab Results   Component Value Date    WBC 17.66 (H) 04/27/2025    HGB 10.4 (L) 04/27/2025    HCT 32.7 (L) 04/27/2025    MCV 96.5 04/27/2025    PLT 97 (L) 04/27/2025     Lab Results   Component Value Date    GLUCOSE 102 (H) 04/27/2025    BUN 14 04/27/2025    CREATININE 0.71 04/27/2025    EGFRIFNONA 60 (L) 02/08/2022    EGFRIFAFRI >60 10/03/2022    BCR 19.7 04/27/2025    CO2 22.5 04/27/2025    CALCIUM 7.9 (L) 04/27/2025    ALBUMIN 3.3 (L) 04/25/2025    LABIL2 2.2 10/03/2022    AST 23 04/25/2025    ALT 45 (H) 04/25/2025       Microbiology:  4/25 BCx NGTD x 2    Dopplers negative for right lower extremity DVT.    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 Bili    Patient remains afebrile.  White blood cell count is trending down.  Will repeat LFTs today to assess T. bili.  Unclear etiology of its elevation.  Dopplers without evidence of DVT.  Continue empiric vancomycin dosing per pharmacy and ceftriaxone 2 g IV every 24 hours.  Will de-escalate  antibiotics in the next day or 2.

## 2025-04-27 NOTE — PROGRESS NOTES
Name: Caitlyn Longoria ADMIT: 2025   : 1934  PCP: Zenaida Suarez MD    MRN: [id number] LOS: 2 days   AGE/SEX: 89 y.o. female  ROOM: [facility]     Subjective   Subjective   Patient sitting up in her recliner, she does not like the hospital BiPAP, only was able to wear it briefly last night.  Right lower extremity erythema present but improved, edema is also improving.       Objective   Objective   Vital Signs  Temp:  [98.1 °F (36.7 °C)-98.8 °F (37.1 °C)] 98.1 °F (36.7 °C)  Heart Rate:  [80] 80  Resp:  [18-22] 18  BP: ()/(62-65) 116/65  SpO2:  [92 %-100 %] 92 %  on   ;   Device (Oxygen Therapy): room air  Body mass index is 32.66 kg/m².  Physical Exam  Constitutional:       General: She is not in acute distress.     Appearance: She is obese. She is ill-appearing (Chronically ill, frail).   HENT:      Head: Normocephalic and atraumatic.      Mouth/Throat:      Mouth: Mucous membranes are moist.      Pharynx: Oropharynx is clear.   Cardiovascular:      Rate and Rhythm: Normal rate and regular rhythm.   Pulmonary:      Effort: Pulmonary effort is normal. No respiratory distress.      Breath sounds: Normal breath sounds. No wheezing.   Abdominal:      General: Abdomen is flat.      Palpations: Abdomen is soft.      Tenderness: There is no abdominal tenderness.   Musculoskeletal:         General: Swelling and tenderness present.      Right lower leg: Edema present.      Left lower leg: Edema present.      Comments: Right>Left edema, 2+ pitting edema on right; 1+ on left  Erythema noted on RLE; chronic venous stasis changes bilaterally   Skin:     General: Skin is warm and dry.   Neurological:      General: No focal deficit present.      Mental Status: She is alert and oriented to person, place, and time. Mental status is at baseline.         Results Review     I reviewed the patient's new clinical results.  Results from last 7 days   Lab Units 25  0336 25  0603 25  1239    WBC 10*3/mm3 17.66* 23.04* 25.16*   HEMOGLOBIN g/dL 10.4* 11.1* 11.9*   PLATELETS 10*3/mm3 97* 102* 127*     Results from last 7 days   Lab Units 04/27/25  0336 04/26/25  2040 04/26/25  0604 04/25/25  1237   SODIUM mmol/L 141  --  142 140   POTASSIUM mmol/L 4.2 4.4 2.9* 2.7*   CHLORIDE mmol/L 111*  --  107 101   CO2 mmol/L 22.5  --  24.0 28.0   BUN mg/dL 14  --  16 22   CREATININE mg/dL 0.71  --  0.57 0.89   GLUCOSE mg/dL 102*  --  40* 131*   Estimated Creatinine Clearance: 54 mL/min (by C-G formula based on SCr of 0.71 mg/dL).  Results from last 7 days   Lab Units 04/27/25  0336 04/25/25  1237   ALBUMIN g/dL 2.5* 3.3*   BILIRUBIN mg/dL 0.8 1.3*   ALK PHOS U/L 129* 109   AST (SGOT) U/L 89* 23   ALT (SGPT) U/L 56* 45*     Results from last 7 days   Lab Units 04/27/25  0336 04/26/25  0604 04/25/25  1237   CALCIUM mg/dL 7.9* 7.8* 8.9   ALBUMIN g/dL 2.5*  --  3.3*     Results from last 7 days   Lab Units 04/25/25  1550 04/25/25  1237   PROCALCITONIN ng/mL  --  0.27*   LACTATE mmol/L 1.2 2.1*     COVID19   Date Value Ref Range Status   02/25/2025 Not Detected Not Detected - Ref. Range Final   09/27/2024 Not Detected Not Detected - Ref. Range Final     Glucose   Date/Time Value Ref Range Status   04/26/2025 0724 77 70 - 130 mg/dL Final   04/26/2025 0644 54 (L) 70 - 130 mg/dL Final       Duplex Venous Lower Extremity - Right CAR    Normal right lower extremity venous duplex scan.    Scheduled Medications  budesonide, 0.5 mg, Nebulization, BID - RT  calcium 500 mg vitamin D 5 mcg (200 UT), 1 tablet, Oral, BID  cefTRIAXone, 2,000 mg, Intravenous, Q24H  cetirizine, 10 mg, Oral, BID  cholecalciferol, 1,000 Units, Oral, Daily  fluticasone, 1 spray, Nasal, Daily  ipratropium-albuterol, 3 mL, Nebulization, Q6H While Awake - RT  miconazole, 1 Application, Topical, Q12H  montelukast, 10 mg, Oral, Nightly  oxybutynin XL, 10 mg, Oral, BID  rosuvastatin, 20 mg, Oral, Nightly  sodium chloride, 4 mL, Nebulization, BID -  RT  vancomycin, 1,250 mg, Intravenous, Q24H  warfarin, 2 mg, Oral, Daily    Infusions  Pharmacy to dose vancomycin,   Pharmacy to dose warfarin,     Diet  Diet: Cardiac; Healthy Heart (2-3 Na+); Fluid Consistency: Thin (IDDSI 0)         I have personally reviewed:  [x]  Laboratory   []  Microbiology   []  Radiology   [x]  EKG/Telemetry   []  Cardiology/Vascular   []  Pathology   []  Records    Assessment/Plan     Active Hospital Problems    Diagnosis  POA    **Cellulitis of right lower extremity [L03.115]  Yes    Thrombocytopenia [D69.6]  Yes    Sick sinus syndrome [I49.5]  Yes    Chronic HFrEF (heart failure with reduced ejection fraction) [I50.22]  Yes    COPD (chronic obstructive pulmonary disease) [J44.9]  Yes    Bilateral carotid artery disease [I77.9]  Yes    Anticoagulated on Coumadin [Z79.01]  Not Applicable    CLL (chronic lymphocytic leukemia) [C91.10]  Yes    HTN (hypertension) [I10]  Yes    CKD (chronic kidney disease), stage III [N18.30]  Yes    Paroxysmal atrial fibrillation [I48.0]  Yes    Type 2 diabetes mellitus [E11.9]  Yes    HLD (hyperlipidemia) [E78.5]  Yes    Coronary artery disease [I25.10]  Yes      Resolved Hospital Problems   No resolved problems to display.       89 y.o. female admitted with Cellulitis of right lower extremity.    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 Miller  - patient developed RLE cellulitis while on suppressive keflex, have asked ID to consult; cont broad spectrum abx for now- d/w Dr. Sy- will change zosyn to rocephin.  - pt initially with borderline low Bps 70s/40s- she responded well to IVF, did not meet sepsis criteria (WBC elevation only)  -RLE doppler NEGATIVE for DVT  - WBC is improving, blood cx are NGTD    Atrial fibrillation on warfarin  S/p pacemaker  HTN  HLD  - pt was hypotensive on arrival to ED so her coreg and loop diuretic were held, she responded to IV fluids- continue to hold BP meds until BP is more  improved/stable  - may need to utilize metoprolol for rate control if BP continues to be low/normal  - cont statin  - pharmacy to dose warfarin    COPD/chronic hypoxic respiratory failure on home O2  - not in exacerbation/at baseline  - continue home inhalers    CHARLENE on home bipap  - Patient reports that she is unable to bring her BiPAP machine in from home as she is worried it is going to be broken during transit which has happened previously  - she is requesting pulm consult for bipap    CLL- has never required tx/follows with Dr. Lakrin at [facility]  Hypogammaglobulinemia on monthly IVIG    Weakness  - acute on chronic  - pt/ot consulted- likely need SNF at [place]    Warfarin (home med) for DVT prophylaxis.  DNR.  Discussed with patient and nursing staff.  Anticipated discharge SNF, next week        Lora Leroy MD  [facility]  04/27/25  12:47 EDT    I wore protective equipment throughout this patient encounter including gloves.  Hand hygiene was performed before donning protective equipment and after removal when leaving the room.         Copied text in this note has been reviewed and is accurate as of 04/27/25.

## 2025-04-27 NOTE — PROGRESS NOTES
[facility] INPATIENT PROGRESS NOTE         [address]    2025      PATIENT IDENTIFICATION:  Name: Caitlyn Longoria ADMIT: 2025   : 1934  PCP: Zenaida Suarez MD    MRN: [id number] LOS: 2 days   AGE/SEX: 89 y.o. female  ROOM: [facility]                     LOS 2    Reason for visit: CHARLENE      SUBJECTIVE:      Resting comfortably.  On room air.  Mild wheeze on auscultation.  Erythema right lower extremity mid calf down with 3+ edema right leg 2+ edema left leg.  She slept with her BiPAP last night.  She says it is not as comfortable as her home CPAP but her family is unable to bring her machine in. I am seeing the patient for the first time today.  All patient problems are new to me.      Objective   OBJECTIVE:    Vital Sign Min/Max for last 24 hours  Temp  Min: 97.2 °F (36.2 °C)  Max: 98.8 °F (37.1 °C)   BP  Min: 99/62  Max: 116/65   Pulse  Min: 69  Max: 80   Resp  Min: 16  Max: 22   SpO2  Min: 96 %  Max: 100 %   No data recorded   Weight  Min: 83.6 kg (184 lb 4.9 oz)  Max: 83.6 kg (184 lb 4.9 oz)    Vitals:    25 0617 25 0627 25 0633 25 0751   BP:    116/65   BP Location:    Right arm   Patient Position:    Lying   Pulse:  80 80 80   Resp:  22 22 20   Temp:    98.1 °F (36.7 °C)   TempSrc:    Oral   SpO2:  96%  96%   Weight: 83.6 kg (184 lb 4.9 oz)      Height:                25  1357 25  0500 25  0617   Weight: 78.2 kg (172 lb 8 oz) 81.2 kg (179 lb 0.2 oz) 83.6 kg (184 lb 4.9 oz)       Body mass index is 32.66 kg/m².                          Body mass index is 32.66 kg/m².    Intake/Output Summary (Last 24 hours) at 2025 0831  Last data filed at 2025 0500  Gross per 24 hour   Intake 360 ml   Output 1000 ml   Net -640 ml         Exam:  GEN:  No distress, appears stated age  EYES:   PERRL, anicteric sclerae  ENT:    External ears/nose normal, OP clear  NECK:  No adenopathy, midline trachea  LUNGS: Normal chest on  inspection, palpation and auscultation  CV:  Normal S1S2, without murmur  ABD:  Nontender, nondistended, no hepatosplenomegaly, +BS  EXT:  No edema.  No cyanosis or clubbing.  No mottling and normal cap refill.    Assessment     Scheduled meds:  budesonide, 0.5 mg, Nebulization, BID - RT  calcium 500 mg vitamin D 5 mcg (200 UT), 1 tablet, Oral, BID  cefTRIAXone, 2,000 mg, Intravenous, Q24H  cetirizine, 10 mg, Oral, BID  cholecalciferol, 1,000 Units, Oral, Daily  fluticasone, 1 spray, Nasal, Daily  ipratropium-albuterol, 3 mL, Nebulization, Q6H While Awake - RT  miconazole, 1 Application, Topical, Q12H  montelukast, 10 mg, Oral, Nightly  oxybutynin XL, 10 mg, Oral, BID  rosuvastatin, 20 mg, Oral, Nightly  sodium chloride, 4 mL, Nebulization, BID - RT  vancomycin, 1,250 mg, Intravenous, Q24H  warfarin, 2 mg, Oral, Daily      IV meds:                      Pharmacy to dose vancomycin,   Pharmacy to dose warfarin,       Data Review:  Results from last 7 days   Lab Units 04/27/25  0336 04/26/25  2040 04/26/25  0604 04/25/25  1237   SODIUM mmol/L 141  --  142 140   POTASSIUM mmol/L 4.2 4.4 2.9* 2.7*   CHLORIDE mmol/L 111*  --  107 101   CO2 mmol/L 22.5  --  24.0 28.0   BUN mg/dL 14  --  16 22   CREATININE mg/dL 0.71  --  0.57 0.89   GLUCOSE mg/dL 102*  --  40* 131*   CALCIUM mg/dL 7.9*  --  7.8* 8.9         Estimated Creatinine Clearance: 54 mL/min (by C-G formula based on SCr of 0.71 mg/dL).  Results from last 7 days   Lab Units 04/27/25  0336 04/26/25  0603 04/25/25  1237   WBC 10*3/mm3 17.66* 23.04* 25.16*   HEMOGLOBIN g/dL 10.4* 11.1* 11.9*   PLATELETS 10*3/mm3 97* 102* 127*     Results from last 7 days   Lab Units 04/26/25  0552 04/25/25  1237 04/21/25  0000   INR  1.77* 2.08* 3.60     Results from last 7 days   Lab Units 04/25/25  1237   ALT (SGPT) U/L 45*   AST (SGOT) U/L 23         Results from last 7 days   Lab Units 04/25/25  1550 04/25/25  1237   PROCALCITONIN ng/mL  --  0.27*   LACTATE mmol/L 1.2 2.1*          Glucose   Date/Time Value Ref Range Status   04/26/2025 0724 77 70 - 130 mg/dL Final   04/26/2025 0644 54 (L) 70 - 130 mg/dL Final         Imaging reviewed  Chest x-ray 4/25 reviewed: No focal consolidation.  Cardiomegaly noted.    Lower extremity Doppler 4/26 reviewed: Normal right lower extremity    Microbiology reviewed  NGTD          Active Hospital Problems    Diagnosis  POA    **Cellulitis of right lower extremity [L03.115]  Yes    Thrombocytopenia [D69.6]  Yes    Sick sinus syndrome [I49.5]  Yes    Chronic HFrEF (heart failure with reduced ejection fraction) [I50.22]  Yes    COPD (chronic obstructive pulmonary disease) [J44.9]  Yes    Bilateral carotid artery disease [I77.9]  Yes    Anticoagulated on Coumadin [Z79.01]  Not Applicable    CLL (chronic lymphocytic leukemia) [C91.10]  Yes    HTN (hypertension) [I10]  Yes    CKD (chronic kidney disease), stage III [N18.30]  Yes    Paroxysmal atrial fibrillation [I48.0]  Yes    Type 2 diabetes mellitus [E11.9]  Yes    HLD (hyperlipidemia) [E78.5]  Yes    Coronary artery disease [I25.10]  Yes      Resolved Hospital Problems   No resolved problems to display.         ASSESSMENT:  CHARLENE  Severe persistent asthma  Hypogammaglobulinemia  Cellulitis right lower extremity      PLAN:  Encourage positive airway pressure with all sleep.  On BiPAP 14/11 with 2 L of oxygen bled in at home.  Bronchodilators as needed for asthma symptoms.  Currently not in a exacerbation.  Antibiotics for cellulitis per admitting service.      Santiago Romero MD  Pulmonary and Critical Care Medicine  [facility]  4/27/2025    08:31 EDT

## 2025-04-27 NOTE — PLAN OF CARE
Goal Outcome Evaluation:  Plan of Care Reviewed With: patient        Progress: improving  Outcome Evaluation: Vital signs stable. Continues on IV vanc and rocephin. Maintenance fluids discontinued. Potassium replaced and recheck wnl. ID and Pulm consulted and following. Pulm ordered Bipap at night. Pt only wore BIPAP for 2hrs then refused to keep on. Sats % on room air while awake and sleeping. Pharm following for coumadin dosing per protocol. Skin care completed. Q2h turn. PT following. Pt rested and slept between care. Safety maintained. Plan of care ongoing.

## 2025-04-27 NOTE — PLAN OF CARE
Goal Outcome Evaluation:  Plan of Care Reviewed With: patient        Progress: improving  Outcome Evaluation: Pt is a 89 year old female admitted d/t malaise and leg pain w/ swelling. Pt reports he lives in an ILF where she reports she was indep w/ ADLs at [facility]. Pt presents to OT eval w/ significant generalized weakness, decreased activity tolerance, balance, and overall decreased indep and safety w/ ADLs and functional mobility. Pt able to complete bed mobility w/ min/mod A, STS transfer w/ max A (x4 attempts to reach full standing position), and ultimately required mod A x2 for stand pivot transfer from EOB > chair. Pt requires total A for LB Dressing and s/u for grooming. Pt may benefit from skilled OT services to address deficits and maximize indep and safety w/ ADLs and functional mobility. Anticipate pt will need SNF at discharge.    Anticipated Discharge Disposition (OT): skilled nursing facility

## 2025-04-28 LAB
ANION GAP SERPL CALCULATED.3IONS-SCNC: 9 MMOL/L (ref 5–15)
BUN SERPL-MCNC: 11 MG/DL (ref 8–23)
BUN/CREAT SERPL: 16.2 (ref 7–25)
CALCIUM SPEC-SCNC: 8.2 MG/DL (ref 8.2–9.6)
CHLORIDE SERPL-SCNC: 109 MMOL/L (ref 98–107)
CO2 SERPL-SCNC: 23 MMOL/L (ref 22–29)
CREAT SERPL-MCNC: 0.68 MG/DL (ref 0.57–1)
CRP SERPL-MCNC: 11.48 MG/DL (ref 0–0.5)
DEPRECATED RDW RBC AUTO: 49.2 FL (ref 37–54)
EGFRCR SERPLBLD CKD-EPI 2021: 82.9 ML/MIN/1.73
ERYTHROCYTE [DISTWIDTH] IN BLOOD BY AUTOMATED COUNT: 13.5 % (ref 12.3–15.4)
GLUCOSE SERPL-MCNC: 125 MG/DL (ref 65–99)
HCT VFR BLD AUTO: 33.7 % (ref 34–46.6)
HGB BLD-MCNC: 10.6 G/DL (ref 12–15.9)
INR PPP: 2.06 (ref 0.9–1.1)
MCH RBC QN AUTO: 31 PG (ref 26.6–33)
MCHC RBC AUTO-ENTMCNC: 31.5 G/DL (ref 31.5–35.7)
MCV RBC AUTO: 98.5 FL (ref 79–97)
PLATELET # BLD AUTO: 108 10*3/MM3 (ref 140–450)
PMV BLD AUTO: 10.2 FL (ref 6–12)
POTASSIUM SERPL-SCNC: 4.1 MMOL/L (ref 3.5–5.2)
PROTHROMBIN TIME: 23.3 SECONDS (ref 11.7–14.2)
RBC # BLD AUTO: 3.42 10*6/MM3 (ref 3.77–5.28)
SODIUM SERPL-SCNC: 141 MMOL/L (ref 136–145)
VANCOMYCIN TROUGH SERPL-MCNC: 12.7 MCG/ML (ref 5–20)
WBC NRBC COR # BLD AUTO: 16.11 10*3/MM3 (ref 3.4–10.8)

## 2025-04-28 PROCEDURE — 85610 PROTHROMBIN TIME: CPT | Performed by: STUDENT IN AN ORGANIZED HEALTH CARE EDUCATION/TRAINING PROGRAM

## 2025-04-28 PROCEDURE — 94799 UNLISTED PULMONARY SVC/PX: CPT

## 2025-04-28 PROCEDURE — 25010000002 VANCOMYCIN HCL 1.25 G RECONSTITUTED SOLUTION 1 EACH VIAL: Performed by: INTERNAL MEDICINE

## 2025-04-28 PROCEDURE — 25810000003 SODIUM CHLORIDE 0.9 % SOLUTION 250 ML FLEX CONT: Performed by: INTERNAL MEDICINE

## 2025-04-28 PROCEDURE — 80048 BASIC METABOLIC PNL TOTAL CA: CPT | Performed by: STUDENT IN AN ORGANIZED HEALTH CARE EDUCATION/TRAINING PROGRAM

## 2025-04-28 PROCEDURE — 94664 DEMO&/EVAL PT USE INHALER: CPT

## 2025-04-28 PROCEDURE — 80202 ASSAY OF VANCOMYCIN: CPT | Performed by: INTERNAL MEDICINE

## 2025-04-28 PROCEDURE — G0545 PR INHERENT VISIT TO INPT: HCPCS | Performed by: INTERNAL MEDICINE

## 2025-04-28 PROCEDURE — 85027 COMPLETE CBC AUTOMATED: CPT | Performed by: STUDENT IN AN ORGANIZED HEALTH CARE EDUCATION/TRAINING PROGRAM

## 2025-04-28 PROCEDURE — 36415 COLL VENOUS BLD VENIPUNCTURE: CPT | Performed by: INTERNAL MEDICINE

## 2025-04-28 PROCEDURE — 94761 N-INVAS EAR/PLS OXIMETRY MLT: CPT

## 2025-04-28 PROCEDURE — 97530 THERAPEUTIC ACTIVITIES: CPT

## 2025-04-28 PROCEDURE — 99233 SBSQ HOSP IP/OBS HIGH 50: CPT | Performed by: INTERNAL MEDICINE

## 2025-04-28 PROCEDURE — 94660 CPAP INITIATION&MGMT: CPT

## 2025-04-28 PROCEDURE — 25010000002 CEFTRIAXONE PER 250 MG: Performed by: STUDENT IN AN ORGANIZED HEALTH CARE EDUCATION/TRAINING PROGRAM

## 2025-04-28 PROCEDURE — 86140 C-REACTIVE PROTEIN: CPT | Performed by: INTERNAL MEDICINE

## 2025-04-28 RX ADMIN — GUAIFENESIN 1200 MG: 600 TABLET, EXTENDED RELEASE ORAL at 20:57

## 2025-04-28 RX ADMIN — BUDESONIDE 0.5 MG: 0.5 INHALANT RESPIRATORY (INHALATION) at 20:50

## 2025-04-28 RX ADMIN — CETIRIZINE HYDROCHLORIDE 10 MG: 10 TABLET ORAL at 08:37

## 2025-04-28 RX ADMIN — Medication 4 ML: at 20:50

## 2025-04-28 RX ADMIN — Medication 1000 UNITS: at 08:37

## 2025-04-28 RX ADMIN — WARFARIN 2 MG: 2 TABLET ORAL at 17:49

## 2025-04-28 RX ADMIN — ANTI-FUNGAL POWDER MICONAZOLE NITRATE TALC FREE 1 APPLICATION: 1.42 POWDER TOPICAL at 21:15

## 2025-04-28 RX ADMIN — IPRATROPIUM BROMIDE AND ALBUTEROL SULFATE 3 ML: .5; 3 SOLUTION RESPIRATORY (INHALATION) at 20:50

## 2025-04-28 RX ADMIN — CALCIUM CARBONATE-VITAMIN D TAB 500 MG-200 UNIT 1 TABLET: 500-200 TAB at 20:57

## 2025-04-28 RX ADMIN — ANTI-FUNGAL POWDER MICONAZOLE NITRATE TALC FREE 1 APPLICATION: 1.42 POWDER TOPICAL at 08:38

## 2025-04-28 RX ADMIN — SODIUM CHLORIDE 2000 MG: 9 INJECTION, SOLUTION INTRAVENOUS at 16:49

## 2025-04-28 RX ADMIN — CALCIUM CARBONATE-VITAMIN D TAB 500 MG-200 UNIT 1 TABLET: 500-200 TAB at 08:37

## 2025-04-28 RX ADMIN — FLUTICASONE PROPIONATE 1 SPRAY: 50 SPRAY, METERED NASAL at 08:38

## 2025-04-28 RX ADMIN — VANCOMYCIN HYDROCHLORIDE 1250 MG: 1.25 INJECTION, POWDER, LYOPHILIZED, FOR SOLUTION INTRAVENOUS at 14:09

## 2025-04-28 RX ADMIN — ROSUVASTATIN CALCIUM 20 MG: 10 TABLET, FILM COATED ORAL at 20:57

## 2025-04-28 RX ADMIN — Medication 4 ML: at 06:40

## 2025-04-28 RX ADMIN — MONTELUKAST 10 MG: 10 TABLET, FILM COATED ORAL at 20:57

## 2025-04-28 RX ADMIN — IPRATROPIUM BROMIDE AND ALBUTEROL SULFATE 3 ML: .5; 3 SOLUTION RESPIRATORY (INHALATION) at 06:39

## 2025-04-28 RX ADMIN — OXYBUTYNIN CHLORIDE 10 MG: 10 TABLET, EXTENDED RELEASE ORAL at 08:37

## 2025-04-28 RX ADMIN — CETIRIZINE HYDROCHLORIDE 10 MG: 10 TABLET ORAL at 20:58

## 2025-04-28 RX ADMIN — BUDESONIDE 0.5 MG: 0.5 INHALANT RESPIRATORY (INHALATION) at 06:41

## 2025-04-28 RX ADMIN — GUAIFENESIN 1200 MG: 600 TABLET, EXTENDED RELEASE ORAL at 08:37

## 2025-04-28 RX ADMIN — OXYBUTYNIN CHLORIDE 10 MG: 10 TABLET, EXTENDED RELEASE ORAL at 20:58

## 2025-04-28 NOTE — PROGRESS NOTES
[facility] Clinical Pharmacy Services: Vancomycin Level Monitoring Note    Caitlyn Longoria is a 89 y.o. female who is on day 4/5 of pharmacy to dose vancomycin for Empiric.    Estimated Creatinine Clearance: 56.2 mL/min (by C-G formula based on SCr of 0.68 mg/dL).    Current Vanc Dose:  1250 mg IV every  24  hours  Results from last 7 days   Lab Units 04/28/25  1315   VANCOMYCIN TR mcg/mL 12.70   Predicted AUC at current dose:453 mg/L.hr  Next Level Date and Time:will be ordered if duration extended    No changes at this time. Pharmacy is continuing to monitor and will adjust as needed.    Parth Bean PharmD  Clinical Pharmacist

## 2025-04-28 NOTE — PROGRESS NOTES
[facility] INPATIENT PROGRESS NOTE         [address]    2025      PATIENT IDENTIFICATION:  Name: Caitlyn Longoria ADMIT: 2025   : 1934  PCP: Zenaida Suarez MD    MRN: [id number] LOS: 3 days   AGE/SEX: 89 y.o. female  ROOM: [facility]                     LOS 3    Reason for visit: CHARLENE      SUBJECTIVE:      Noted issues late yesterday evening with coughing fit and she is on 2 L supplemental oxygen.  Cough has improved.  She still has mild cough of productive gray phlegm but this is better than yesterday she says.  No chest pain.  She says that her leg is improving.  Slept okay on her BiPAP.      Objective   OBJECTIVE:    Vital Sign Min/Max for last 24 hours  Temp  Min: 98.1 °F (36.7 °C)  Max: 99.1 °F (37.3 °C)   BP  Min: 102/51  Max: 107/67   Pulse  Min: 78  Max: 84   Resp  Min: 18  Max: 32   SpO2  Min: 92 %  Max: 100 %   No data recorded   Weight  Min: 83.1 kg (183 lb 3.2 oz)  Max: 83.1 kg (183 lb 3.2 oz)    Vitals:    25 0640 25 0641 25 0654 25 0801   BP:    103/49   BP Location:    Left arm   Patient Position:    Lying   Pulse: 81 80 80 80   Resp:    20   Temp:    98.1 °F (36.7 °C)   TempSrc:    Oral   SpO2: 100% 100% 100% 100%   Weight:       Height:                25  0500 25  0617 25  0637   Weight: 81.2 kg (179 lb 0.2 oz) 83.6 kg (184 lb 4.9 oz) 83.1 kg (183 lb 3.2 oz)       Body mass index is 32.46 kg/m².                          Body mass index is 32.46 kg/m².    Intake/Output Summary (Last 24 hours) at 2025 0925  Last data filed at 2025 0500  Gross per 24 hour   Intake 120 ml   Output 1300 ml   Net -1180 ml         Exam:  GEN:  No distress, appears stated age  EYES:   PERRL, anicteric sclerae  ENT:    External ears/nose normal, OP clear  NECK:  No adenopathy, midline trachea  LUNGS: Normal chest on inspection, palpation and auscultation  CV:  Normal S1S2, without murmur  ABD:  Nontender, nondistended, no  hepatosplenomegaly, +BS  EXT:  No edema.  No cyanosis or clubbing.  No mottling and normal cap refill.    Assessment     Scheduled meds:  budesonide, 0.5 mg, Nebulization, BID - RT  calcium 500 mg vitamin D 5 mcg (200 UT), 1 tablet, Oral, BID  cefTRIAXone, 2,000 mg, Intravenous, Q24H  cetirizine, 10 mg, Oral, BID  cholecalciferol, 1,000 Units, Oral, Daily  fluticasone, 1 spray, Nasal, Daily  guaiFENesin, 1,200 mg, Oral, Q12H  ipratropium-albuterol, 3 mL, Nebulization, Q6H While Awake - RT  miconazole, 1 Application, Topical, Q12H  montelukast, 10 mg, Oral, Nightly  oxybutynin XL, 10 mg, Oral, BID  rosuvastatin, 20 mg, Oral, Nightly  sodium chloride, 4 mL, Nebulization, BID - RT  vancomycin, 1,250 mg, Intravenous, Q24H  warfarin, 2 mg, Oral, Daily      IV meds:                      Pharmacy to dose vancomycin,   Pharmacy to dose warfarin,       Data Review:  Results from last 7 days   Lab Units 04/28/25 0438 04/27/25  0336 04/26/25  2040 04/26/25  0604 04/25/25  1237   SODIUM mmol/L 141 141  --  142 140   POTASSIUM mmol/L 4.1 4.2 4.4 2.9* 2.7*   CHLORIDE mmol/L 109* 111*  --  107 101   CO2 mmol/L 23.0 22.5  --  24.0 28.0   BUN mg/dL 11 14  --  16 22   CREATININE mg/dL 0.68 0.71  --  0.57 0.89   GLUCOSE mg/dL 125* 102*  --  40* 131*   CALCIUM mg/dL 8.2 7.9*  --  7.8* 8.9         Estimated Creatinine Clearance: 56.2 mL/min (by C-G formula based on SCr of 0.68 mg/dL).  Results from last 7 days   Lab Units 04/28/25  0438 04/27/25  0336 04/26/25  0603 04/25/25  1237   WBC 10*3/mm3 16.11* 17.66* 23.04* 25.16*   HEMOGLOBIN g/dL 10.6* 10.4* 11.1* 11.9*   PLATELETS 10*3/mm3 108* 97* 102* 127*     Results from last 7 days   Lab Units 04/28/25  0438 04/27/25  1224 04/26/25  0552 04/25/25  1237   INR  2.06* 1.89* 1.77* 2.08*     Results from last 7 days   Lab Units 04/27/25  0336 04/25/25  1237   ALT (SGPT) U/L 56* 45*   AST (SGOT) U/L 89* 23         Results from last 7 days   Lab Units 04/25/25  1550 04/25/25  1237    PROCALCITONIN ng/mL  --  0.27*   LACTATE mmol/L 1.2 2.1*         Glucose   Date/Time Value Ref Range Status   04/26/2025 0724 77 70 - 130 mg/dL Final   04/26/2025 0644 54 (L) 70 - 130 mg/dL Final         Imaging reviewed  Chest x-ray 4/25 reviewed: No focal consolidation.  Cardiomegaly noted.    Lower extremity Doppler 4/26 reviewed: Normal right lower extremity    Microbiology reviewed  NGTD          Active Hospital Problems    Diagnosis  POA    **Cellulitis of right lower extremity [L03.115]  Yes    Thrombocytopenia [D69.6]  Yes    Sick sinus syndrome [I49.5]  Yes    Chronic HFrEF (heart failure with reduced ejection fraction) [I50.22]  Yes    COPD (chronic obstructive pulmonary disease) [J44.9]  Yes    Bilateral carotid artery disease [I77.9]  Yes    Anticoagulated on Coumadin [Z79.01]  Not Applicable    CLL (chronic lymphocytic leukemia) [C91.10]  Yes    HTN (hypertension) [I10]  Yes    CKD (chronic kidney disease), stage III [N18.30]  Yes    Paroxysmal atrial fibrillation [I48.0]  Yes    Type 2 diabetes mellitus [E11.9]  Yes    HLD (hyperlipidemia) [E78.5]  Yes    Coronary artery disease [I25.10]  Yes      Resolved Hospital Problems   No resolved problems to display.         ASSESSMENT:  CHARLENE  Severe persistent asthma  Hypogammaglobulinemia  Cellulitis right lower extremity      PLAN:  Encourage positive airway pressure with all sleep.  On BiPAP 14/11 with 2 L of oxygen bled in at home.  Bronchodilators as needed for asthma symptoms.  Currently not in a exacerbation.  Antibiotics for cellulitis per admitting service.      Santiago Romero MD  Pulmonary and Critical Care Medicine  [facility], [facility]  4/28/2025    09:25 EDT

## 2025-04-28 NOTE — CASE MANAGEMENT/SOCIAL WORK
Discharge Planning Assessment  [facility]     Patient Name: Caitlyn Longoria  MRN: [id number]  Today's Date: 4/28/2025    Admit Date: 4/25/2025    Plan: Referral to [facility]   Discharge Needs Assessment       Row Name 04/28/25 0930       Living Environment    People in Home alone    Current Living Arrangements apartment;independent living facility    Potentially Unsafe Housing Conditions none    Primary Care Provided by self    Provides Primary Care For no one    Family Caregiver if Needed other relative(s)    Quality of Family Relationships involved;helpful    Able to Return to Prior Arrangements yes       Transition Planning    Patient/Family Anticipates Transition to inpatient rehabilitation facility    Patient/Family Anticipated Services at Transition none    Transportation Anticipated family or friend will provide       Discharge Needs Assessment    Readmission Within the Last 30 Days no previous admission in last 30 days    Equipment Currently Used at Home walker, rolling;bipap;rollator    Concerns to be Addressed discharge planning    Equipment Needed After Discharge none    Outpatient/Agency/Support Group Needs skilled nursing facility    Discharge Facility/Level of Care Needs nursing facility, skilled    Provided Post Acute Provider List? Yes    Post Acute Provider List Nursing Home                   Discharge Plan       Row Name 04/28/25 0992       Plan    Plan Referral to [facility]    Patient/Family in Agreement with Plan yes    Plan Comments Spoke with pt bedside. Confirmed facesheet correct. Explained role of CCP. Pt [facility] at [facility] Pt reports she has been mostly IADLs her friends were checking on her. Her niece Zoey and her brother Trae are her HCS. She has Bipap, rollator and walker. Her PCP is Dr. Amarilis Suarez and uses [facility] no issues. She reports she is current with [facility] . She reports she will need SNF and request referral to [facility], referral placed. Packet in  office. CCP to follow.                   Continued Care and Services - Admitted Since 4/25/2025       Destination       Service Provider Request Status Services Address Phone Fax Patient Preferred    [facility] Pending - Request Sent -- [address] [address] [phone] --              Home Medical Care       Service Provider Request Status Services Address Phone Fax Patient Preferred    [facility] Pending - Request Sent -- [address] [address] [phone] --                     Demographic Summary    No documentation.                  Functional Status    No documentation.                  Psychosocial    No documentation.                  Abuse/Neglect    No documentation.                  Legal    No documentation.                  Substance Abuse    No documentation.                  Patient Forms    No documentation.                     REYNALDO Ash

## 2025-04-28 NOTE — PROGRESS NOTES
LOS: 3 days     Chief Complaint: Right lower extremity cellulitis    Interval History: No acute events.  No fever.  She says her leg is markedly improved compared to admission.  She says on her first day in the hospital she could not move her leg or have anyone come close to without it hurting.  This is no longer the case.  She is tolerating vancomycin and ceftriaxone without any apparent side effects.  She is working with PT who got her out of bed to the chair today.    Medications:    Current Facility-Administered Medications:     acetaminophen (TYLENOL) tablet 650 mg, 650 mg, Oral, Q4H PRN **OR** acetaminophen (TYLENOL) 160 MG/5ML oral solution 650 mg, 650 mg, Oral, Q4H PRN **OR** acetaminophen (TYLENOL) suppository 650 mg, 650 mg, Rectal, Q4H PRN, Jojo Garcia MD    albuterol (PROVENTIL) nebulizer solution 0.083% 2.5 mg/3mL, 2.5 mg, Nebulization, Q6H PRN, Jojo Garcia MD, 2.5 mg at 04/27/25 1545    sennosides-docusate (PERICOLACE) 8.6-50 MG per tablet 2 tablet, 2 tablet, Oral, BID PRN **AND** polyethylene glycol (MIRALAX) packet 17 g, 17 g, Oral, Daily PRN **AND** bisacodyl (DULCOLAX) EC tablet 5 mg, 5 mg, Oral, Daily PRN **AND** bisacodyl (DULCOLAX) suppository 10 mg, 10 mg, Rectal, Daily PRN, Jojo Garcia MD    budesonide (PULMICORT) nebulizer solution 0.5 mg, 0.5 mg, Nebulization, BID - RT, Jojo Garcia MD, 0.5 mg at 04/28/25 0641    calcium 500 mg vitamin D 5 mcg (200 UT) per tablet 1 tablet, 1 tablet, Oral, BID, Jojo Garcia MD, 1 tablet at 04/28/25 0837    Calcium Replacement - Follow Nurse / BPA Driven Protocol, , Not Applicable, PRN, Jojo Garcia MD    cefTRIAXone (ROCEPHIN) 2,000 mg in sodium chloride 0.9 % 100 mL MBP, 2,000 mg, Intravenous, Q24H, Lora Leroy MD, Last Rate: 200 mL/hr at 04/27/25 1658, 2,000 mg at 04/27/25 1658    cetirizine (zyrTEC) tablet 10 mg, 10 mg, Oral, BID, Lora Leroy MD, 10 mg at 04/28/25 0851     cholecalciferol (VITAMIN D3) tablet 1,000 Units, 1,000 Units, Oral, Daily, Jojo Garcia MD, 1,000 Units at 04/28/25 0837    fluticasone (FLONASE) 50 MCG/ACT nasal spray 1 spray, 1 spray, Nasal, Daily, Lora Leroy MD, 1 spray at 04/28/25 0838    guaiFENesin (MUCINEX) 12 hr tablet 1,200 mg, 1,200 mg, Oral, Q12H, Lora Leroy MD, 1,200 mg at 04/28/25 0837    ipratropium-albuterol (DUO-NEB) nebulizer solution 3 mL, 3 mL, Nebulization, Q6H While Awake - RT, Jojo Garcia MD, 3 mL at 04/28/25 0639    Magnesium Standard Dose Replacement - Follow Nurse / BPA Driven Protocol, , Not Applicable, PRN, Jojo Garcia MD    melatonin tablet 2.5 mg, 2.5 mg, Oral, Nightly PRN, Jojo Garcia MD    miconazole (MICOTIN) 2 % powder 1 Application, 1 Application, Topical, Q12H, Jojo Garcia MD, 1 Application at 04/28/25 0838    montelukast (SINGULAIR) tablet 10 mg, 10 mg, Oral, Nightly, Jojo Garcia MD, 10 mg at 04/27/25 2137    ondansetron ODT (ZOFRAN-ODT) disintegrating tablet 4 mg, 4 mg, Oral, Q6H PRN **OR** ondansetron (ZOFRAN) injection 4 mg, 4 mg, Intravenous, Q6H PRN, Jojo Garcia MD    oxybutynin XL (DITROPAN-XL) 24 hr tablet 10 mg, 10 mg, Oral, BID, Jojo Garcia MD, 10 mg at 04/28/25 0837    Pharmacy to dose vancomycin, , Not Applicable, Continuous PRN, Mehul Miller MD    Pharmacy to dose warfarin, , Not Applicable, Continuous PRN, Jojo Garcia MD    Phosphorus Replacement - Follow Nurse / BPA Driven Protocol, , Not Applicable, PRN, Jojo Garcia Akhil, MD    Potassium Replacement - Follow Nurse / BPA Driven Protocol, , Not Applicable, PRN, Jojo Garcia MD    rosuvastatin (CRESTOR) tablet 20 mg, 20 mg, Oral, Nightly, Jojo Garcia MD, 20 mg at 04/27/25 2137    [COMPLETED] Insert Peripheral IV, , , Once **AND** sodium chloride 0.9 % flush 10 mL, 10 mL, Intravenous, PRN, Noah Saunders MD     sodium chloride 7 % nebulizer solution nebulizer solution 4 mL, 4 mL, Nebulization, BID - RT, Jojo Garcia MD, 4 mL at 04/28/25 0640    Vancomycin HCl 1,250 mg in sodium chloride 0.9 % 250 mL VTB, 1,250 mg, Intravenous, Q24H, Jojo Garcia MD, Last Rate: 200 mL/hr at 04/27/25 1452, 1,250 mg at 04/27/25 1452    warfarin (COUMADIN) tablet 2 mg, 2 mg, Oral, Daily, Jojo Garcia MD, 2 mg at 04/27/25 1658      Vital Signs  Temp:  [98.1 °F (36.7 °C)-99.1 °F (37.3 °C)] 98.1 °F (36.7 °C)  Heart Rate:  [78-84] 80  Resp:  [18-32] 20  BP: (102-107)/(49-67) 103/49    Physical Exam:  General: NAD, very nice, sitting up in chair  Cardiovascular: Normal rate  Respiratory: Breathing comfortably on RA without wheezing  GI: Not distended or tender  : No Mancilla catheter  Skin: Improved erythema RLE; RLE wound bandage     Results Review:    CBC, BMP, CRP,  and blood cultures reviewed today  Lab Results   Component Value Date    WBC 16.11 (H) 04/28/2025    HGB 10.6 (L) 04/28/2025    HCT 33.7 (L) 04/28/2025    MCV 98.5 (H) 04/28/2025     (L) 04/28/2025     Lab Results   Component Value Date    GLUCOSE 125 (H) 04/28/2025    CALCIUM 8.2 04/28/2025     04/28/2025    K 4.1 04/28/2025    CO2 23.0 04/28/2025     (H) 04/28/2025    BUN 11 04/28/2025    CREATININE 0.68 04/28/2025    EGFR 82.9 04/28/2025    BCR 16.2 04/28/2025    ANIONGAP 9.0 04/28/2025     Lab Results   Component Value Date    CRP 11.48 (H) 04/28/2025     Lab Results   Component Value Date    VANCOTROUGH 10.80 08/29/2024    VANCORANDOM 25.60 08/27/2024       Microbiology:  4/25 BCx negative so far    Radiology:  RLE Doppler negative for DVT    Assessment & Plan   #1 RLE cellulitis  #2 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  Objectively she has improved with lack of fever and a down-trending WBC.  Her blood cultures are negative to date.  I recommend that she continue empiric vancomycin with goal -600 and ceftriaxone 2 g IV every 24 hours.  I will determine an oral antibiotic regimen closer to the time of discharge.  I will hold her chronic suppressive cephalexin for now given her broad-spectrum antibiotic use.  This was discussed with the patient and antibiotic education was provided.  She expressed understanding.    While the patient is on vancomycin, vancomycin levels will be ordered and reviewed with dose adjustments made as needed. The patient will have a daily creatinine level checked while on vancomycin as part of monitoring this medication.     D/W Dr. Leroy.    ID will follow.

## 2025-04-28 NOTE — PLAN OF CARE
Goal Outcome Evaluation:           Progress: no change  Outcome Evaluation: VSS afebrile. On bipap for 2 hours, resumed 4lpm NC humidified. No complaints, R leg elevated, dressing cdi. Plan of care ongoing.

## 2025-04-28 NOTE — PLAN OF CARE
Goal Outcome Evaluation:  Plan of Care Reviewed With: patient           Outcome Evaluation: Patient seen for PT session this AM. Patient supine in bed upon arrival. Patient sat up to EOB with Bob and increased time. Patient required a rest break before standing from slightly elevated EOB with Bob. Patient was able to take a few shuffled steps from EOB to the chair with rwx and Bob. Patient declined further mobility this date due to fatigue. Patient would continue to benefit from skilled PT intervention to address deficits in functional mobility. PT will continue to monitor.    Anticipated Discharge Disposition (PT): skilled nursing facility

## 2025-04-28 NOTE — NURSING NOTE
CWON note: attempted to see pt for coccyx and RLE wounds POA. She was up in the chair so will try back later.

## 2025-04-28 NOTE — THERAPY TREATMENT NOTE
Patient Name: Caitlyn Longoria  : 1934    MRN: [id number]                              Today's Date: 2025       Admit Date: 2025    Visit Dx:     ICD-10-CM ICD-9-CM   1. Sepsis without acute organ dysfunction, due to unspecified organism  A41.9 038.9     995.91   2. Cellulitis of right lower extremity  L03.115 682.6   3. Leukocytosis, unspecified type  D72.829 288.60   4. Hypokalemia  E87.6 276.8   5. Pedal edema  R60.0 782.3     Patient Active Problem List   Diagnosis    Neck and shoulder pain    Arthropathy of shoulder region    Carpal tunnel syndrome of left wrist    Chronic pain of both shoulders    Chronic left shoulder pain    Arthropathy of left shoulder    Dysarthria    Type 2 diabetes mellitus    Paroxysmal atrial fibrillation    HLD (hyperlipidemia)    Chronic bronchitis    Coronary artery disease    CVA (cerebral vascular accident)    HTN (hypertension)    CKD (chronic kidney disease), stage III    Chronic combined systolic (congestive) and diastolic (congestive) heart failure    Infection of prosthetic right knee joint    Stasis dermatitis of right lower extremity due to peripheral venous hypertension    Current use of long term anticoagulation    Morbid obesity    Acute respiratory failure with hypoxia    Rhinovirus    Asthma with acute exacerbation    Acute respiratory failure with hypoxemia    Viral pneumonia    Hypoxia    CLL (chronic lymphocytic leukemia)    Dyspnea    Anticoagulated on Coumadin    Osteoporotic compression fracture of spine    Pneumonia due to gram-negative bacteria    Pneumonia due to infectious organism    Bilateral carotid artery disease    Hypogammaglobulinemia    Hypogammaglobulinemia    Cellulitis of right lower extremity    Hypokalemia    Nocturnal hypoxia    COPD (chronic obstructive pulmonary disease)    Upper respiratory tract infection    COPD (chronic obstructive pulmonary disease)    Sick sinus syndrome    Anemia    Thrombocytopenia    Chronic HFrEF  (heart failure with reduced ejection fraction)    Cellulitis of left lower leg    Acute exacerbation of COPD with asthma    Leukocytosis    COPD exacerbation     Past Medical History:   Diagnosis Date    Aortic calcification     mild, on echo 12/17/2017    Aortic regurgitation     Trace    Asthma     Atrial fibrillation     CAD (coronary artery disease)     Carpal tunnel syndrome of left wrist     Chronic combined systolic and diastolic congestive heart failure     CKD (chronic kidney disease) stage 3, GFR 30-59 ml/min     COPD (chronic obstructive pulmonary disease)     Coronary artery disease involving native coronary artery of native heart with angina pectoris     Disc degeneration, lumbar     Diverticulosis     DM type 2 (diabetes mellitus, type 2)     Dyslipidemia     GERD (gastroesophageal reflux disease)     History of aneurysm     right femoral artery s/p LHC    History of blood transfusion     History of fracture     History of heart attack     History of vitamin D deficiency     Hyperlipidemia     Hypertension     Leukemia     Mild mitral regurgitation     Mitral annular calcification     12/8/2017- echo, moderate    Osteopenia     PAF (paroxysmal atrial fibrillation)     Peripheral neuropathy     Skin cancer     Left hand    Sleep apnea     bipap    SSS (sick sinus syndrome)     Stroke (cerebrum)     TIA (transient ischemic attack) 2017    Tricuspid regurgitation     Trace     Past Surgical History:   Procedure Laterality Date    BRONCHOSCOPY Bilateral 10/6/2020    Procedure: BRONCHOSCOPY with BILATERAL LUNG washings;  Surgeon: Juno Coburn MD;  Location: [facility] ENDOSCOPY;  Service: Pulmonary;  Laterality: Bilateral;  PRE: purulent bronchitis  POST: PURULENT BRONCHITIS    BRONCHOSCOPY Bilateral 10/9/2020    Procedure: BRONCHOSCOPY with washing;  Surgeon: Juno Coburn MD;  Location: [facility] ENDOSCOPY;  Service: Pulmonary;  Laterality: Bilateral;  pre/post - mucous plug      CARDIAC CATHETERIZATION       CARDIAC ELECTROPHYSIOLOGY PROCEDURE N/A 2/7/2020    Procedure: PPM generator change - dual  medtronic;  Surgeon: Kiel Field MD;  Location: [facility] CATH INVASIVE LOCATION;  Service: Cardiology;  Laterality: N/A;    CHOLECYSTECTOMY      CORONARY STENT PLACEMENT      ENDOSCOPY N/A 9/14/2022    Procedure: ESOPHAGOGASTRODUODENOSCOPY with 54fr main dilatation;  Surgeon: Enio Cota MD;  Location: [facility] ENDOSCOPY;  Service: Gastroenterology;  Laterality: N/A;  pre - dysphagia  post - s/p dilatation, watermelon stomach    HERNIA REPAIR      hital hernia    HYSTERECTOMY      PACEMAKER IMPLANTATION      REPLACEMENT TOTAL KNEE Bilateral       General Information       Row Name 04/28/25 1310          Physical Therapy Time and Intention    Document Type therapy note (daily note)  -     Mode of Treatment individual therapy;physical therapy  -       Row Name 04/28/25 1310          General Information    Patient Profile Reviewed yes  -     Prior Level of Function independent:  -     Existing Precautions/Restrictions fall  -       Row Name 04/28/25 1310          Cognition    Orientation Status (Cognition) oriented x 4  -       Row Name 04/28/25 1310          Safety Issues/Impairments Affecting Functional Mobility    Impairments Affecting Function (Mobility) balance;endurance/activity tolerance;strength;range of motion (ROM)  -               User Key  (r) = Recorded By, (t) = Taken By, (c) = Cosigned By      Initials Name Provider Type     Evelyn Murillo, CHERRY Physical Therapist                   Mobility       Row Name 04/28/25 1310          Bed Mobility    Bed Mobility supine-sit  -     Supine-Sit [place] (Bed Mobility) minimum assist (75% patient effort);verbal cues  -     Assistive Device (Bed Mobility) head of bed elevated;bed rails  -       Row Name 04/28/25 1310          Bed-Chair Transfer    Bed-Chair [place] (Transfers) minimum assist (75% patient effort);verbal cues  -      Assistive Device (Bed-Chair Transfers) walker, front-wheeled  -SM       Row Name 04/28/25 1310          Sit-Stand Transfer    Sit-Stand [place] (Transfers) minimum assist (75% patient effort);verbal cues  -     Assistive Device (Sit-Stand Transfers) walker, front-wheeled  -SM     Comment, (Sit-Stand Transfer) slightly elevated EOB  -SM       Row Name 04/28/25 1310          Gait/Stairs (Locomotion)    Distance in Feet (Gait) 3  -SM     Deviations/Abnormal Patterns (Gait) jeremi decreased;festinating/shuffling;gait speed decreased  -SM     Bilateral Gait Deviations forward flexed posture  -SM               User Key  (r) = Recorded By, (t) = Taken By, (c) = Cosigned By      Initials Name Provider Type     Evelyn Murillo PT Physical Therapist                   Obj/Interventions       Row Name 04/28/25 1311          Balance    Balance Assessment sitting static balance;sitting dynamic balance;standing static balance;standing dynamic balance  -     Static Sitting Balance standby assist  -     Dynamic Sitting Balance contact guard  -     Position, Sitting Balance sitting edge of bed  -     Static Standing Balance contact guard  -SM     Dynamic Standing Balance minimal assist  -SM     Position/Device Used, Standing Balance supported;walker, front-wheeled  -SM     Balance Interventions sitting;standing;sit to stand;supported;static;dynamic  -               User Key  (r) = Recorded By, (t) = Taken By, (c) = Cosigned By      Initials Name Provider Type     Evelyn Murillo PT Physical Therapist                   Goals/Plan    No documentation.                  Clinical Impression       Row Name 04/28/25 1312          Pain    Pretreatment Pain Rating 0/10 - no pain  -SM     Posttreatment Pain Rating 0/10 - no pain  -SM       Row Name 04/28/25 1312          Plan of Care Review    Plan of Care Reviewed With patient  -     Outcome Evaluation Patient seen for PT session this AM. Patient supine in  bed upon arrival. Patient sat up to EOB with Bob and increased time. Patient required a rest break before standing from slightly elevated EOB with Bob. Patient was able to take a few shuffled steps from EOB to the chair with rwx and Bob. Patient declined further mobility this date due to fatigue. Patient would continue to benefit from skilled PT intervention to address deficits in functional mobility. PT will continue to monitor.  -       Row Name 04/28/25 1312          Vital Signs    Pre Patient Position Supine  -     Intra Patient Position Standing  -     Post Patient Position Sitting  -       Row Name 04/28/25 1312          Positioning and Restraints    Pre-Treatment Position in bed  -SM     Post Treatment Position chair  -SM     In Chair notified nsg;reclined;call light within reach;encouraged to call for assist;exit alarm on  -               User Key  (r) = Recorded By, (t) = Taken By, (c) = Cosigned By      Initials Name Provider Type     Evelyn Murillo, PT Physical Therapist                   Outcome Measures       Row Name 04/28/25 1317 04/28/25 0837       How much help from another person do you currently need...    Turning from your back to your side while in flat bed without using bedrails? 3  -SM 3  -JH    Moving from lying on back to sitting on the side of a flat bed without bedrails? 3  -SM 3  -JH    Moving to and from a bed to a chair (including a wheelchair)? 3  -SM 2  -JH    Standing up from a chair using your arms (e.g., wheelchair, bedside chair)? 3  -SM 2  -JH    Climbing 3-5 steps with a railing? 1  -SM 1  -JH    To walk in hospital room? 2  -SM 2  -JH    AM-PAC 6 Clicks Score (PT) 15  -SM 13  -JH    Highest Level of Mobility Goal 4 --> Transfer to chair/commode  -SM 4 --> Transfer to chair/commode  -JH      Row Name 04/28/25 1317          Functional Assessment    Outcome Measure Options AM-PAC 6 Clicks Basic Mobility (PT)  -               User Key  (r) = Recorded By, (t) =  Taken By, (c) = Cosigned By      Initials Name Provider Type    Pricilla Magana RN Registered Nurse     Evelyn Murillo PT Physical Therapist                                 Physical Therapy Education       Title: PT OT SLP Therapies (In Progress)       Topic: Physical Therapy (Done)       Point: Mobility training (Done)       Learning Progress Summary            Patient Acceptance, E, VU,NR by  at 4/28/2025 1317    Acceptance, E, VU,NR by  at 4/26/2025 0956                      Point: Home exercise program (Done)       Learning Progress Summary            Patient Acceptance, E, VU,NR by  at 4/28/2025 1317    Acceptance, E, VU,NR by  at 4/26/2025 0956                      Point: Body mechanics (Done)       Learning Progress Summary            Patient Acceptance, E, VU,NR by  at 4/28/2025 1317    Acceptance, E, VU,NR by  at 4/26/2025 0956                      Point: Precautions (Done)       Learning Progress Summary            Patient Acceptance, E, VU,NR by  at 4/28/2025 1317    Acceptance, E, VU,NR by  at 4/26/2025 0956                                      User Key       Initials Effective Dates Name Provider Type Discipline     05/02/22 -  Evelyn Murillo PT Physical Therapist PT                  PT Recommendation and Plan     Outcome Evaluation: Patient seen for PT session this AM. Patient supine in bed upon arrival. Patient sat up to EOB with Bob and increased time. Patient required a rest break before standing from slightly elevated EOB with Bob. Patient was able to take a few shuffled steps from EOB to the chair with rwx and Bob. Patient declined further mobility this date due to fatigue. Patient would continue to benefit from skilled PT intervention to address deficits in functional mobility. PT will continue to monitor.     Time Calculation:         PT Charges       Row Name 04/28/25 1317             Time Calculation    Start Time 0855  -      Stop Time 0908  -      Time  Calculation (min) 13 min  -SM      PT Received On 04/28/25  -SM      PT - Next Appointment 04/29/25  -         Time Calculation- PT    Total Timed Code Minutes- PT 13 minute(s)  -SM         Timed Charges    39916 - PT Therapeutic Activity Minutes 13  -SM         Total Minutes    Timed Charges Total Minutes 13  -SM       Total Minutes 13  -SM                User Key  (r) = Recorded By, (t) = Taken By, (c) = Cosigned By      Initials Name Provider Type     Evelyn Murillo PT Physical Therapist                  Therapy Charges for Today       Code Description Service Date Service Provider Modifiers Qty    [id number]  PT THERAPEUTIC ACT EA 15 MIN 4/28/2025 Evelyn Murillo, PT GP 1            PT G-Codes  Outcome Measure Options: AM-PAC 6 Clicks Basic Mobility (PT)  AM-PAC 6 Clicks Score (PT): 15  AM-PAC 6 Clicks Score (OT): 14  PT Discharge Summary  Anticipated Discharge Disposition (PT): skilled nursing facility    Evelyn Murillo PT  4/28/2025

## 2025-04-28 NOTE — PROGRESS NOTES
"Nutrition Services    Patient Name: Caitlyn Longoria  YOB: 1934  MRN: [id number]  Admission date: 4/25/2025    Assessment Date:  04/28/25    NUTRITION EVALUATION      Reason for Encounter Pressure Injury and/or Non-Healing Wound   Diagnosis/Problem Admission Diagnosis:  Hypokalemia [E87.6]  Pedal edema [R60.0]  Cellulitis of right lower extremity [L03.115]  Sepsis [A41.9]  Leukocytosis, unspecified type [D72.829]  Sepsis without acute organ dysfunction, due to unspecified organism [A41.9]    Problem List:    Cellulitis of right lower extremity    Type 2 diabetes mellitus    Paroxysmal atrial fibrillation    HLD (hyperlipidemia)    Coronary artery disease    HTN (hypertension)    CKD (chronic kidney disease), stage III    CLL (chronic lymphocytic leukemia)    Anticoagulated on Coumadin    Bilateral carotid artery disease    COPD (chronic obstructive pulmonary disease)    Sick sinus syndrome    Thrombocytopenia    Chronic HFrEF (heart failure with reduced ejection fraction)     Narrative 89 yo, female admitted for hypokalemia, edema, and sepsis.     RD visted pt at bedside. Pt alert and responsive. Pt lives in an independent living facility and often eats at the restaurant on the bottom floor. Eats frozen meals when food at restaurant is not preferred. Pt reports she has some challenges with swallowing \"\"it gets stuck [behind sternum] and I don't have enough phlegm to make it move and it sometimes affects my breathing.\" She also stated she has passed all the swallow test conducted previously. Pt declined soft to chew / chopped diet. Pt also reported the mask she wears to do her breathing treatments has been bothering her cheeks recently, likely due to fat and muscle loss.     NFPE was conducted with consent.       PO Diet Diet: Cardiac; Healthy Heart (2-3 Na+); Fluid Consistency: Thin (IDDSI 0)   Allergies NKFA   Supplements n/a   PO Intake % ~75%       Chewing/Swallowing Difficulty other:per patient " "foods gets stuck behind her sternum sometimes causing some breathing challenges.       Medications reviewed - cholecalciferol, Crestor, Rocephin, Vancomycin, Warfarin,    Labs  reviewed - Hypochloridemia, hyperglycemia, hypokalemia resolved with Klor-Con 4/26, Hemoglobin on downward trend       Physical Findings alert, oriented, overweight, room air, short of breath     Edema lower extremity , 3+ (moderate), 4+ (severe) Left and Right respectively   GI Function last bowel movement: 4/27   Skin Status pressure injury: sacral spine, other: Right lower leg     Lines/Drains none   I/O reviewed, net fluid loss, and amount/timeframe:170 mL since addmission         Height  Weight  BMI  Weight Trend     Height: 160 cm (62.99\")  Weight: 83.1 kg (183 lb 3.2 oz) (04/28/25 0637)  Body mass index is 32.46 kg/m².  Gain, Amount/Timeframe: 9.9 lbs    Weight change: weight gain of 9.9 lbs (5.7%) over 4 day(s)    Significant?  Yes       NFPE Consented to exam, Date Completed: 4/28  See MSA below.       Nutrition Problem (PES) Problem: Malnutrition (moderate) and Increased Nutrient Needs  Etiology: Medical Diagnosis - CHF and Factors Affecting Nutrition - wounds and pressure injuries    Signs/Symptoms: Other (comment)3+ and 4+ edema, pressure injury, moderate muscle wasting per NFPE, and wounds.        Intervention/Plan Add Ash BID to provide 90 calories, 7 grams L-Arginine, 7 grams L-Glutamine and 2.5 grams Protein (Collagen) per serving (if consumed), to support wound healing.    Monitor PO intake and encourage >75% intake.      RD to follow up per protocol.       Malnutrition Severity Assessment (MSA)         Malnutrition Type (Last 8 Hours)       Malnutrition Severity Assessment       Row Name 04/28/25 1012       Malnutrition Severity Assessment    Malnutrition Type Chronic Disease - Related Malnutrition      Row Name 04/28/25 1012       Insufficient Energy Intake     Insufficient Energy Intake Findings Mild      Row Name " 04/28/25 1012       Unintentional Weight Loss     Unintentional Weight Loss Findings None      Row Name 04/28/25 1012       Muscle Loss    Loss of Muscle Mass Findings Moderate    [place] Moderate - slight depression    Clavicle Bone Region Moderate - some protrusion in females, visible in males    Acromion Bone Region Moderate - acromion may slightly protrude    Dorsal Hand Region Moderate - slight depression      Row Name 04/28/25 1012       Fat Loss    Subcutaneous Fat Loss Findings None    Orbital Region  None    Upper Arm Region None      Row Name 04/28/25 1012       Fluid Accumulation (Edema)    Fluid Acumulation Findings Severe    Fluid Accumulation  Severe equals 3+ or 4+ pitting edema      Row Name 04/28/25 1012       Declining Functional Status    Declining Functional Status Findings N/A      Row Name 04/28/25 1012       Criteria Met (Must meet criteria for severity in at least 2 of these categories: M Wasting, Fat Loss, Fluid, Secondary Signs, Wt. Status, Intake)    Patient meets criteria for  Moderate (non-severe) Malnutrition                         Results from last 7 days   Lab Units 04/28/25  0438 04/27/25  0336 04/26/25  2040 04/26/25  0604 04/25/25  1237   SODIUM mmol/L 141 141  --  142 140   POTASSIUM mmol/L 4.1 4.2 4.4 2.9* 2.7*   CHLORIDE mmol/L 109* 111*  --  107 101   CO2 mmol/L 23.0 22.5  --  24.0 28.0   BUN mg/dL 11 14  --  16 22   CREATININE mg/dL 0.68 0.71  --  0.57 0.89   CALCIUM mg/dL 8.2 7.9*  --  7.8* 8.9   BILIRUBIN mg/dL  --  0.8  --   --  1.3*   ALK PHOS U/L  --  129*  --   --  109   ALT (SGPT) U/L  --  56*  --   --  45*   AST (SGOT) U/L  --  89*  --   --  23   GLUCOSE mg/dL 125* 102*  --  40* 131*     Results from last 7 days   Lab Units 04/28/25  0438   HEMOGLOBIN g/dL 10.6*   HEMATOCRIT % 33.7*     Lab Results   Component Value Date    HGBA1C 6.80 (H) 08/23/2024     Wt Readings from Last 10 Encounters:   04/28/25 83.1 kg (183 lb 3.2 oz)   03/20/25 78.6 kg (173 lb 4.8 oz)    03/03/25 80.7 kg (178 lb)   02/27/25 80.8 kg (178 lb 3.2 oz)   02/20/25 79.7 kg (175 lb 12.8 oz)   02/20/25 78.5 kg (173 lb)   02/07/25 78.5 kg (173 lb)   01/23/25 81.2 kg (179 lb)   01/06/25 84.5 kg (186 lb 4.6 oz)   12/26/24 84.5 kg (186 lb 3.2 oz)       Electronically signed by:  Ann-Marie Akbar RD  04/28/25 11:21 EDT

## 2025-04-28 NOTE — PLAN OF CARE
Goal Outcome Evaluation:  Plan of Care Reviewed With: patient        Progress: no change  Outcome Evaluation: No c/o pain or soa. Up to chair and BSC today, BM x1. Continues on IV vanc and rocephin. SNF at discharge.

## 2025-04-28 NOTE — PROGRESS NOTES
Name: Caitlyn Longoria ADMIT: 2025   : 1934  PCP: Zenadia Suarez MD    MRN: [id number] LOS: 3 days   AGE/SEX: 89 y.o. female  ROOM: [facility]     Subjective   Subjective   Patient sitting up in her recliner, she seems to be doing ok this morning. Her RLE is improving, erythema is improved and there is no longer any noted weeping. I am able to touch it without her wincing today.        Objective   Objective   Vital Signs  Temp:  [98.1 °F (36.7 °C)-99.1 °F (37.3 °C)] 98.1 °F (36.7 °C)  Heart Rate:  [78-84] 80  Resp:  [20-32] 20  BP: (102-107)/(49-67) 103/49  SpO2:  [94 %-100 %] 100 %  on  Flow (L/min) (Oxygen Therapy):  [2] 2;   Device (Oxygen Therapy): room air  Body mass index is 32.46 kg/m².  Physical Exam  Constitutional:       General: She is not in acute distress.     Appearance: She is obese. She is ill-appearing (Chronically ill, frail).   HENT:      Head: Normocephalic and atraumatic.      Mouth/Throat:      Mouth: Mucous membranes are moist.      Pharynx: Oropharynx is clear.   Cardiovascular:      Rate and Rhythm: Normal rate and regular rhythm.   Pulmonary:      Effort: Pulmonary effort is normal. No respiratory distress.      Breath sounds: Normal breath sounds. No wheezing.   Abdominal:      General: Abdomen is flat.      Palpations: Abdomen is soft.      Tenderness: There is no abdominal tenderness.   Musculoskeletal:      Right lower leg: No edema.      Left lower leg: No edema.      Comments: Right>Left edema, 2+ pitting edema on right; 1+ on left  Erythema noted on RLE; chronic venous stasis changes bilaterally  All improving today- less erythema, less edema/resolved- weeping better   Skin:     General: Skin is warm and dry.   Neurological:      General: No focal deficit present.      Mental Status: She is alert and oriented to person, place, and time. Mental status is at baseline.         Results Review     I reviewed the patient's new clinical results.  Results from last  7 days   Lab Units 04/28/25  0438 04/27/25  0336 04/26/25  0603 04/25/25  1237   WBC 10*3/mm3 16.11* 17.66* 23.04* 25.16*   HEMOGLOBIN g/dL 10.6* 10.4* 11.1* 11.9*   PLATELETS 10*3/mm3 108* 97* 102* 127*     Results from last 7 days   Lab Units 04/28/25  0438 04/27/25  0336 04/26/25  2040 04/26/25  0604 04/25/25  1237   SODIUM mmol/L 141 141  --  142 140   POTASSIUM mmol/L 4.1 4.2 4.4 2.9* 2.7*   CHLORIDE mmol/L 109* 111*  --  107 101   CO2 mmol/L 23.0 22.5  --  24.0 28.0   BUN mg/dL 11 14  --  16 22   CREATININE mg/dL 0.68 0.71  --  0.57 0.89   GLUCOSE mg/dL 125* 102*  --  40* 131*   Estimated Creatinine Clearance: 56.2 mL/min (by C-G formula based on SCr of 0.68 mg/dL).  Results from last 7 days   Lab Units 04/27/25  0336 04/25/25  1237   ALBUMIN g/dL 2.5* 3.3*   BILIRUBIN mg/dL 0.8 1.3*   ALK PHOS U/L 129* 109   AST (SGOT) U/L 89* 23   ALT (SGPT) U/L 56* 45*     Results from last 7 days   Lab Units 04/28/25  0438 04/27/25  0336 04/26/25  0604 04/25/25  1237   CALCIUM mg/dL 8.2 7.9* 7.8* 8.9   ALBUMIN g/dL  --  2.5*  --  3.3*     Results from last 7 days   Lab Units 04/25/25  1550 04/25/25  1237   PROCALCITONIN ng/mL  --  0.27*   LACTATE mmol/L 1.2 2.1*     COVID19   Date Value Ref Range Status   02/25/2025 Not Detected Not Detected - Ref. Range Final   09/27/2024 Not Detected Not Detected - Ref. Range Final     Glucose   Date/Time Value Ref Range Status   04/26/2025 0724 77 70 - 130 mg/dL Final   04/26/2025 0644 54 (L) 70 - 130 mg/dL Final       Duplex Venous Lower Extremity - Right CAR    Normal right lower extremity venous duplex scan.    Scheduled Medications  budesonide, 0.5 mg, Nebulization, BID - RT  calcium 500 mg vitamin D 5 mcg (200 UT), 1 tablet, Oral, BID  cefTRIAXone, 2,000 mg, Intravenous, Q24H  cetirizine, 10 mg, Oral, BID  cholecalciferol, 1,000 Units, Oral, Daily  fluticasone, 1 spray, Nasal, Daily  guaiFENesin, 1,200 mg, Oral, Q12H  ipratropium-albuterol, 3 mL, Nebulization, Q6H While Awake -  RT  miconazole, 1 Application, Topical, Q12H  montelukast, 10 mg, Oral, Nightly  oxybutynin XL, 10 mg, Oral, BID  rosuvastatin, 20 mg, Oral, Nightly  sodium chloride, 4 mL, Nebulization, BID - RT  vancomycin, 1,250 mg, Intravenous, Q24H  warfarin, 2 mg, Oral, Daily    Infusions  Pharmacy to dose vancomycin,   Pharmacy to dose warfarin,     Diet  Diet: Cardiac; Healthy Heart (2-3 Na+); Fluid Consistency: Thin (IDDSI 0)         I have personally reviewed:  [x]  Laboratory   []  Microbiology   []  Radiology   [x]  EKG/Telemetry   []  Cardiology/Vascular   []  Pathology   []  Records    Assessment/Plan     Active Hospital Problems    Diagnosis  POA    **Cellulitis of right lower extremity [L03.115]  Yes    Thrombocytopenia [D69.6]  Yes    Sick sinus syndrome [I49.5]  Yes    Chronic HFrEF (heart failure with reduced ejection fraction) [I50.22]  Yes    COPD (chronic obstructive pulmonary disease) [J44.9]  Yes    Bilateral carotid artery disease [I77.9]  Yes    Anticoagulated on Coumadin [Z79.01]  Not Applicable    CLL (chronic lymphocytic leukemia) [C91.10]  Yes    HTN (hypertension) [I10]  Yes    CKD (chronic kidney disease), stage III [N18.30]  Yes    Paroxysmal atrial fibrillation [I48.0]  Yes    Type 2 diabetes mellitus [E11.9]  Yes    HLD (hyperlipidemia) [E78.5]  Yes    Coronary artery disease [I25.10]  Yes      Resolved Hospital Problems   No resolved problems to display.       89 y.o. female admitted with Cellulitis of right lower extremity.    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 Miller  - patient developed RLE cellulitis while on suppressive keflex, have asked ID to consult; cont broad spectrum abx for now- d/w Dr. Sy- will change zosyn to rocephin.  - pt initially with borderline low Bps 70s/40s- she responded well to IVF, did not meet sepsis criteria (WBC elevation only)  -RLE doppler NEGATIVE for DVT  - WBC is improving, blood cx are NGTD  - d/w Dr Miller-  recommends another 1-2 days IV abx with po abx plan at [place].     Atrial fibrillation on warfarin  S/p pacemaker  HTN  HLD  - pt was hypotensive on arrival to ED so her coreg and loop diuretic were held, she responded to IV fluids- continue to hold BP meds until BP is more improved/stable  - may need to utilize metoprolol for rate control if BP continues to be low/normal  - cont statin  - pharmacy to dose warfarin    COPD/chronic hypoxic respiratory failure on home O2  - not in exacerbation/at baseline  - continue home inhalers    CHARLENE on home bipap  - Patient reports that she is unable to bring her BiPAP machine in from home as she is worried it is going to be broken during transit which has happened previously  - she is requesting pulm consult for bipap    CLL- has never required tx/follows with Dr. Larkin at [facility]  Hypogammaglobulinemia on monthly IVIG    Weakness  - acute on chronic  - pt/ot consulted- likely need SNF at [place]    Warfarin (home med) for DVT prophylaxis.  DNR.  Discussed with patient and nursing staff.  Anticipated discharge SNF, next week        Lora Leroy MD  [facility]  04/28/25  12:09 EDT    I wore protective equipment throughout this patient encounter including gloves.  Hand hygiene was performed before donning protective equipment and after removal when leaving the room.         Copied text in this note has been reviewed and is accurate as of 04/28/25.

## 2025-04-28 NOTE — DISCHARGE PLACEMENT REQUEST
"Delfin Longoria (89 y.o. Female)       Date of Birth   1934    Social Security Number       Address   [address] [place] [address] [facility] [address]    Home Phone   [phone]    MRN   [id number]       Catholic   Anabaptism    Marital Status   Single                            Admission Date   4/25/2025    Admission Type   Emergency    Admitting Provider   Jojo Garcia MD    Attending Provider   Lora Leroy MD    Department, Room/Bed   [address], E452/1       Discharge Date       Discharge Disposition       Discharge Destination                                 Attending Provider: Lora Leroy MD    Allergies: Accupril [Quinapril Hcl], Ahist [Chlorpheniramine], Clarithromycin, Esomeprazole, Latex, Levalbuterol, Levocetirizine, Lipitor [Atorvastatin], Omeprazole, Pravachol [Pravastatin], Sulindac, Valdecoxib, Chlorcyclizine, Chlorpheniramine-phenylephrine, Diclofenac Sodium, Diphenhydramine, Lodine [Etodolac], Sulfa Antibiotics    Isolation: None   Infection: None   Code Status: No CPR    Ht: 160 cm (62.99\")   Wt: 83.1 kg (183 lb 3.2 oz)    Admission Cmt: None   Principal Problem: Cellulitis of right lower extremity [L03.115]                   Active Insurance as of 4/25/2025       Primary Coverage       Payor Plan Insurance Group Employer/Plan Group    MEDICARE MEDICARE A & B        Payor Plan Address Payor Plan Phone Number Payor Plan Fax Number Effective Dates    [address] [phone]  10/1/1999 - None Entered    [facility] [id number]         Subscriber Name Subscriber Birth Date Member ID       DELFIN LONGORIA 1934 [id number]               Secondary Coverage       Payor Plan Insurance Group Employer/Plan Group    AARP MC SUP AARP HEALTH CARE OPTIONS PLAN F       Payor Plan Address Payor Plan Phone Number Payor Plan Fax Number Effective Dates    [facility] [phone]  1/1/2015 - None Entered    [address]       [facility] " [address]         Subscriber Name Subscriber Birth Date Member ID       DELFIN LONGORIA 1934 [id number]               Tertiary Coverage       Payor Plan Insurance Group Employer/Plan Group    MISC HOME HEALTH MISC HOME HEALTH NGN       Coverage Address Coverage Phone Number Coverage Fax Number Effective Dates    [address] [phone]  10/28/2024 - None Entered    [facility] [address]         Subscriber Name Subscriber Birth Date Member ID       DELFIN LONGORIA 1934 [id number]                     Emergency Contacts        (Rel.) Home Phone Work Phone Mobile Phone    Zoey Adair (HCS) (Relative) [phone] -- [phone]    Trae Longoria (HCS) (Brother) [phone] -- [phone]    Inez Longoria (Relative) [phone] -- [phone]

## 2025-04-29 PROBLEM — E44.0 MODERATE PROTEIN-CALORIE MALNUTRITION: Status: ACTIVE | Noted: 2025-04-29

## 2025-04-29 LAB
ANION GAP SERPL CALCULATED.3IONS-SCNC: 9.2 MMOL/L (ref 5–15)
BUN SERPL-MCNC: 11 MG/DL (ref 8–23)
BUN/CREAT SERPL: 15.1 (ref 7–25)
CALCIUM SPEC-SCNC: 8.4 MG/DL (ref 8.2–9.6)
CHLORIDE SERPL-SCNC: 104 MMOL/L (ref 98–107)
CO2 SERPL-SCNC: 22.8 MMOL/L (ref 22–29)
CREAT SERPL-MCNC: 0.73 MG/DL (ref 0.57–1)
DEPRECATED RDW RBC AUTO: 48 FL (ref 37–54)
EGFRCR SERPLBLD CKD-EPI 2021: 78.2 ML/MIN/1.73
ERYTHROCYTE [DISTWIDTH] IN BLOOD BY AUTOMATED COUNT: 13.5 % (ref 12.3–15.4)
GLUCOSE SERPL-MCNC: 144 MG/DL (ref 65–99)
HCT VFR BLD AUTO: 32.5 % (ref 34–46.6)
HGB BLD-MCNC: 10.3 G/DL (ref 12–15.9)
INR PPP: 2.18 (ref 0.9–1.1)
MCH RBC QN AUTO: 30.7 PG (ref 26.6–33)
MCHC RBC AUTO-ENTMCNC: 31.7 G/DL (ref 31.5–35.7)
MCV RBC AUTO: 97 FL (ref 79–97)
PLATELET # BLD AUTO: 108 10*3/MM3 (ref 140–450)
PMV BLD AUTO: 10.4 FL (ref 6–12)
POTASSIUM SERPL-SCNC: 3.8 MMOL/L (ref 3.5–5.2)
PROTHROMBIN TIME: 24.5 SECONDS (ref 11.7–14.2)
RBC # BLD AUTO: 3.35 10*6/MM3 (ref 3.77–5.28)
SODIUM SERPL-SCNC: 136 MMOL/L (ref 136–145)
WBC NRBC COR # BLD AUTO: 14.35 10*3/MM3 (ref 3.4–10.8)

## 2025-04-29 PROCEDURE — 25810000003 SODIUM CHLORIDE 0.9 % SOLUTION 250 ML FLEX CONT: Performed by: INTERNAL MEDICINE

## 2025-04-29 PROCEDURE — 94664 DEMO&/EVAL PT USE INHALER: CPT

## 2025-04-29 PROCEDURE — 80048 BASIC METABOLIC PNL TOTAL CA: CPT | Performed by: STUDENT IN AN ORGANIZED HEALTH CARE EDUCATION/TRAINING PROGRAM

## 2025-04-29 PROCEDURE — 85027 COMPLETE CBC AUTOMATED: CPT | Performed by: STUDENT IN AN ORGANIZED HEALTH CARE EDUCATION/TRAINING PROGRAM

## 2025-04-29 PROCEDURE — 97535 SELF CARE MNGMENT TRAINING: CPT

## 2025-04-29 PROCEDURE — 94799 UNLISTED PULMONARY SVC/PX: CPT

## 2025-04-29 PROCEDURE — 85610 PROTHROMBIN TIME: CPT | Performed by: STUDENT IN AN ORGANIZED HEALTH CARE EDUCATION/TRAINING PROGRAM

## 2025-04-29 PROCEDURE — 97530 THERAPEUTIC ACTIVITIES: CPT

## 2025-04-29 PROCEDURE — 25010000002 VANCOMYCIN HCL 1.25 G RECONSTITUTED SOLUTION 1 EACH VIAL: Performed by: INTERNAL MEDICINE

## 2025-04-29 PROCEDURE — 25010000002 CEFTRIAXONE PER 250 MG: Performed by: STUDENT IN AN ORGANIZED HEALTH CARE EDUCATION/TRAINING PROGRAM

## 2025-04-29 PROCEDURE — 99233 SBSQ HOSP IP/OBS HIGH 50: CPT | Performed by: INTERNAL MEDICINE

## 2025-04-29 PROCEDURE — 94761 N-INVAS EAR/PLS OXIMETRY MLT: CPT

## 2025-04-29 PROCEDURE — G0545 PR INHERENT VISIT TO INPT: HCPCS | Performed by: INTERNAL MEDICINE

## 2025-04-29 RX ADMIN — CETIRIZINE HYDROCHLORIDE 10 MG: 10 TABLET ORAL at 08:40

## 2025-04-29 RX ADMIN — CALCIUM CARBONATE-VITAMIN D TAB 500 MG-200 UNIT 1 TABLET: 500-200 TAB at 08:41

## 2025-04-29 RX ADMIN — MONTELUKAST 10 MG: 10 TABLET, FILM COATED ORAL at 20:20

## 2025-04-29 RX ADMIN — CALCIUM CARBONATE-VITAMIN D TAB 500 MG-200 UNIT 1 TABLET: 500-200 TAB at 20:20

## 2025-04-29 RX ADMIN — IPRATROPIUM BROMIDE AND ALBUTEROL SULFATE 3 ML: .5; 3 SOLUTION RESPIRATORY (INHALATION) at 19:21

## 2025-04-29 RX ADMIN — WARFARIN 2 MG: 2 TABLET ORAL at 17:20

## 2025-04-29 RX ADMIN — SODIUM CHLORIDE 2000 MG: 9 INJECTION, SOLUTION INTRAVENOUS at 16:33

## 2025-04-29 RX ADMIN — Medication 1000 UNITS: at 08:41

## 2025-04-29 RX ADMIN — Medication 4 ML: at 19:31

## 2025-04-29 RX ADMIN — ROSUVASTATIN CALCIUM 20 MG: 10 TABLET, FILM COATED ORAL at 20:20

## 2025-04-29 RX ADMIN — GUAIFENESIN 1200 MG: 600 TABLET, EXTENDED RELEASE ORAL at 08:41

## 2025-04-29 RX ADMIN — Medication 1 APPLICATION: at 18:44

## 2025-04-29 RX ADMIN — IPRATROPIUM BROMIDE AND ALBUTEROL SULFATE 3 ML: .5; 3 SOLUTION RESPIRATORY (INHALATION) at 11:42

## 2025-04-29 RX ADMIN — Medication 4 ML: at 08:18

## 2025-04-29 RX ADMIN — ANTI-FUNGAL POWDER MICONAZOLE NITRATE TALC FREE 1 APPLICATION: 1.42 POWDER TOPICAL at 08:41

## 2025-04-29 RX ADMIN — OXYBUTYNIN CHLORIDE 10 MG: 10 TABLET, EXTENDED RELEASE ORAL at 08:41

## 2025-04-29 RX ADMIN — GUAIFENESIN 1200 MG: 600 TABLET, EXTENDED RELEASE ORAL at 20:20

## 2025-04-29 RX ADMIN — IPRATROPIUM BROMIDE AND ALBUTEROL SULFATE 3 ML: .5; 3 SOLUTION RESPIRATORY (INHALATION) at 08:17

## 2025-04-29 RX ADMIN — BUDESONIDE 0.5 MG: 0.5 INHALANT RESPIRATORY (INHALATION) at 08:19

## 2025-04-29 RX ADMIN — BUDESONIDE 0.5 MG: 0.5 INHALANT RESPIRATORY (INHALATION) at 19:22

## 2025-04-29 RX ADMIN — VANCOMYCIN HYDROCHLORIDE 1250 MG: 1.25 INJECTION, POWDER, LYOPHILIZED, FOR SOLUTION INTRAVENOUS at 15:08

## 2025-04-29 RX ADMIN — CETIRIZINE HYDROCHLORIDE 10 MG: 10 TABLET ORAL at 20:20

## 2025-04-29 RX ADMIN — OXYBUTYNIN CHLORIDE 10 MG: 10 TABLET, EXTENDED RELEASE ORAL at 20:20

## 2025-04-29 NOTE — PLAN OF CARE
Problem: Adult Inpatient Plan of Care  Goal: Plan of Care Review  Outcome: Progressing  Flowsheets (Taken 4/29/2025 0429)  Progress: improving  Outcome Evaluation: Pt vital stable. No c/o pain. IV abx continued. Pt up to chair today. Right leg wrapped. Pt safety maintained  Plan of Care Reviewed With: patient

## 2025-04-29 NOTE — PROGRESS NOTES
[facility] INPATIENT PROGRESS NOTE         [address]    2025      PATIENT IDENTIFICATION:  Name: Caitlyn Longoria ADMIT: 2025   : 1934  PCP: Zenaida Suarez MD    MRN: [id number] LOS: 4 days   AGE/SEX: 89 y.o. female  ROOM: [facility]                     LOS 4    Reason for visit: CHARLENE      SUBJECTIVE:      Resting comfortably.  No new complaints.  Denies nausea, chest pain or productive cough.  Says that her leg discomfort from her cellulitis is improving.      Objective   OBJECTIVE:    Vital Sign Min/Max for last 24 hours  Temp  Min: 98.1 °F (36.7 °C)  Max: 98.8 °F (37.1 °C)   BP  Min: 101/66  Max: 114/69   Pulse  Min: 80  Max: 82   Resp  Min: 16  Max: 22   SpO2  Min: 98 %  Max: 99 %   No data recorded   Weight  Min: 80.6 kg (177 lb 11.1 oz)  Max: 80.6 kg (177 lb 11.1 oz)    Vitals:    25 0700 25 0817 25 0818 25 0819   BP: 114/69      BP Location: Left arm      Patient Position: Lying      Pulse: 81 81 80 82   Resp: 16 16     Temp: 98.1 °F (36.7 °C)      TempSrc: Oral      SpO2: 98% 98% 99% 99%   Weight:       Height:                25  0617 25  0637 25  0600   Weight: 83.6 kg (184 lb 4.9 oz) 83.1 kg (183 lb 3.2 oz) 80.6 kg (177 lb 11.1 oz)       Body mass index is 31.48 kg/m².                          Body mass index is 31.48 kg/m².    Intake/Output Summary (Last 24 hours) at 2025 0932  Last data filed at 2025 0700  Gross per 24 hour   Intake 120 ml   Output 800 ml   Net -680 ml         Exam:  GEN:  No distress, appears stated age  EYES:   PERRL, anicteric sclerae  ENT:    External ears/nose normal, OP clear  NECK:  No adenopathy, midline trachea  LUNGS: Normal chest on inspection, palpation and auscultation  CV:  Normal S1S2, without murmur  ABD:  Nontender, nondistended, no hepatosplenomegaly, +BS  EXT:  No edema.  No cyanosis or clubbing.  No mottling and normal cap refill.    Assessment     Scheduled meds:   budesonide, 0.5 mg, Nebulization, BID - RT  calcium 500 mg vitamin D 5 mcg (200 UT), 1 tablet, Oral, BID  cefTRIAXone, 2,000 mg, Intravenous, Q24H  cetirizine, 10 mg, Oral, BID  cholecalciferol, 1,000 Units, Oral, Daily  fluticasone, 1 spray, Nasal, Daily  guaiFENesin, 1,200 mg, Oral, Q12H  ipratropium-albuterol, 3 mL, Nebulization, Q6H While Awake - RT  miconazole, 1 Application, Topical, Q12H  montelukast, 10 mg, Oral, Nightly  oxybutynin XL, 10 mg, Oral, BID  rosuvastatin, 20 mg, Oral, Nightly  sodium chloride, 4 mL, Nebulization, BID - RT  vancomycin, 1,250 mg, Intravenous, Q24H  warfarin, 2 mg, Oral, Daily      IV meds:                      Pharmacy to dose vancomycin,   Pharmacy to dose warfarin,       Data Review:  Results from last 7 days   Lab Units 04/29/25 0259 04/28/25 0438 04/27/25 0336 04/26/25  2040 04/26/25  0604 04/25/25  1237   SODIUM mmol/L 136 141 141  --  142 140   POTASSIUM mmol/L 3.8 4.1 4.2 4.4 2.9* 2.7*   CHLORIDE mmol/L 104 109* 111*  --  107 101   CO2 mmol/L 22.8 23.0 22.5  --  24.0 28.0   BUN mg/dL 11 11 14  --  16 22   CREATININE mg/dL 0.73 0.68 0.71  --  0.57 0.89   GLUCOSE mg/dL 144* 125* 102*  --  40* 131*   CALCIUM mg/dL 8.4 8.2 7.9*  --  7.8* 8.9         Estimated Creatinine Clearance: 51.5 mL/min (by C-G formula based on SCr of 0.73 mg/dL).  Results from last 7 days   Lab Units 04/29/25 0259 04/28/25 0438 04/27/25  0336 04/26/25  0603 04/25/25  1237   WBC 10*3/mm3 14.35* 16.11* 17.66* 23.04* 25.16*   HEMOGLOBIN g/dL 10.3* 10.6* 10.4* 11.1* 11.9*   PLATELETS 10*3/mm3 108* 108* 97* 102* 127*     Results from last 7 days   Lab Units 04/29/25  0259 04/28/25  0438 04/27/25  1224 04/26/25  0552 04/25/25  1237   INR  2.18* 2.06* 1.89* 1.77* 2.08*     Results from last 7 days   Lab Units 04/27/25  0336 04/25/25  1237   ALT (SGPT) U/L 56* 45*   AST (SGOT) U/L 89* 23         Results from last 7 days   Lab Units 04/25/25  1550 04/25/25  1237   PROCALCITONIN ng/mL  --  0.27*   LACTATE  "mmol/L 1.2 2.1*         No results found for: \"HGBA1C\", \"POCGLU\"        Imaging reviewed  Chest x-ray 4/25 reviewed: No focal consolidation.  Cardiomegaly noted.    Lower extremity Doppler 4/26 reviewed: Normal right lower extremity    Microbiology reviewed  NGTD          Active Hospital Problems    Diagnosis  POA    **Cellulitis of right lower extremity [L03.115]  Yes    Moderate protein-calorie malnutrition [E44.0]  Yes    Thrombocytopenia [D69.6]  Yes    Sick sinus syndrome [I49.5]  Yes    Chronic HFrEF (heart failure with reduced ejection fraction) [I50.22]  Yes    COPD (chronic obstructive pulmonary disease) [J44.9]  Yes    Bilateral carotid artery disease [I77.9]  Yes    Anticoagulated on Coumadin [Z79.01]  Not Applicable    CLL (chronic lymphocytic leukemia) [C91.10]  Yes    HTN (hypertension) [I10]  Yes    CKD (chronic kidney disease), stage III [N18.30]  Yes    Paroxysmal atrial fibrillation [I48.0]  Yes    Type 2 diabetes mellitus [E11.9]  Yes    HLD (hyperlipidemia) [E78.5]  Yes    Coronary artery disease [I25.10]  Yes      Resolved Hospital Problems   No resolved problems to display.         ASSESSMENT:  CHARLENE  Severe persistent asthma  Hypogammaglobulinemia  Cellulitis right lower extremity      PLAN:  Encourage positive airway pressure with all sleep.  On BiPAP 14/11 with 2 L of oxygen bled in at home.  Bronchodilators as needed for asthma symptoms.  Currently not in a exacerbation.  Antibiotics for cellulitis per admitting service.      Santiago Romero MD  Pulmonary and Critical Care Medicine  [facility]  4/29/2025    09:32 EDT     "

## 2025-04-29 NOTE — PLAN OF CARE
Goal Outcome Evaluation:  Plan of Care Reviewed With: patient        Progress: improving  Outcome Evaluation: VSS afebrile. Pt appears to have had an uneventful shift, up to BSC x1, thought she needed to have BM, false alarm. No complaints. Plan of care ongoing.

## 2025-04-29 NOTE — NURSING NOTE
04/29/25 0945   Wound 02/26/25 0000 sacral spine pressure injury   Placement Date/Time: 02/26/25 0000   Location: sacral spine  Primary Wound Type: Pressure Injury  Type: pressure injury  Present on Original Admission: Yes   Pressure Injury Stage 2   Base nonblanchable;red   Periwound redness   Periwound Temperature warm   Periwound Skin Turgor soft   Skin Interventions   Pressure Reduction Devices specialty bed utilized  (Already on Centrella Pro+)   Pressure Reduction Techniques frequent weight shift encouraged;heels elevated off bed;positioned off wounds;pressure points protected     Reason for Visit: CWCN: We saw the patient at the request of the floor nurse regarding skin issue on the coccyx and lower leg. The patient is alert and able to turn with assistance.  Upon assessment, a dark, chronic redness with partial-thickness skin loss was observed on the coccyx, possibly related to a stage 2 pressure injury, and moisture.  Towards the lower leg, a swollen appearance, bright redness, tenderness to the touch, and an open wound with weeping area were noted. Cellulitis was the diagnosis on admission.  Bilateral Heels and Ankles  are with intact skin and normal coloration.  Treatment Plan/Recommendations: Calmoseptine ointment was ordered to Sacrococcygeal/Buttocks area.  Protective padding to facilitate cushioning may be used, heels should remain elevated off the bed.  Wound care order and pressure ulcer standing measures have been placed into Epic  She is already on specialty mattress, Centrella Pro+ for adequate pressure redistribution and pressure relief.  To rt lower leg open wound, Xeroform dressing ABD pad and wrap Kerlix ordered.  Wound Team Follow up Plan: WCN will follow up according her needs.

## 2025-04-29 NOTE — PLAN OF CARE
Goal Outcome Evaluation:  Plan of Care Reviewed With: patient        Progress: improving  Outcome Evaluation: pt was able to complete ~4 steps to chair with RW. Pt wtih cues for safety. Pt able to complete ADLs up in chair. Pt cont to benefit from OT at this time to promote safety, ADL IND, and functional mobility. Pt at this time is unsafe to return home and needs rehab.    Anticipated Discharge Disposition (OT): skilled nursing facility, sub acute care setting

## 2025-04-29 NOTE — PROGRESS NOTES
LOS: 4 days     Chief Complaint: Right lower extremity cellulitis    Interval History: No acute events.  No fever.  She says her right leg is better again today.  She is tolerating vancomycin and ceftriaxone without rash or diarrhea.    Medications:    Current Facility-Administered Medications:     acetaminophen (TYLENOL) tablet 650 mg, 650 mg, Oral, Q4H PRN **OR** acetaminophen (TYLENOL) 160 MG/5ML oral solution 650 mg, 650 mg, Oral, Q4H PRN **OR** acetaminophen (TYLENOL) suppository 650 mg, 650 mg, Rectal, Q4H PRN, Jojo Garcia MD    albuterol (PROVENTIL) nebulizer solution 0.083% 2.5 mg/3mL, 2.5 mg, Nebulization, Q6H PRN, Jojo Garcia MD, 2.5 mg at 04/27/25 1545    sennosides-docusate (PERICOLACE) 8.6-50 MG per tablet 2 tablet, 2 tablet, Oral, BID PRN **AND** polyethylene glycol (MIRALAX) packet 17 g, 17 g, Oral, Daily PRN **AND** bisacodyl (DULCOLAX) EC tablet 5 mg, 5 mg, Oral, Daily PRN **AND** bisacodyl (DULCOLAX) suppository 10 mg, 10 mg, Rectal, Daily PRN, Jojo Garcia MD    budesonide (PULMICORT) nebulizer solution 0.5 mg, 0.5 mg, Nebulization, BID - RT, Jojo Garcia MD, 0.5 mg at 04/29/25 0819    calcium 500 mg vitamin D 5 mcg (200 UT) per tablet 1 tablet, 1 tablet, Oral, BID, Jojo Garcia MD, 1 tablet at 04/29/25 0841    Calcium Replacement - Follow Nurse / BPA Driven Protocol, , Not Applicable, PRN, Jojo Garcia MD    cefTRIAXone (ROCEPHIN) 2,000 mg in sodium chloride 0.9 % 100 mL MBP, 2,000 mg, Intravenous, Q24H, Lora Leroy MD, Last Rate: 200 mL/hr at 04/28/25 1649, 2,000 mg at 04/28/25 1649    cetirizine (zyrTEC) tablet 10 mg, 10 mg, Oral, BID, Lora Leroy MD, 10 mg at 04/29/25 0840    cholecalciferol (VITAMIN D3) tablet 1,000 Units, 1,000 Units, Oral, Daily, Stingl, Jojo Landaverde MD, 1,000 Units at 04/29/25 0841    fluticasone (FLONASE) 50 MCG/ACT nasal spray 1 spray, 1 spray, Nasal, Daily, Lora Leroy MD, 1 spray  at 04/28/25 0838    guaiFENesin (MUCINEX) 12 hr tablet 1,200 mg, 1,200 mg, Oral, Q12H, Lora Leroy MD, 1,200 mg at 04/29/25 0841    ipratropium-albuterol (DUO-NEB) nebulizer solution 3 mL, 3 mL, Nebulization, Q6H While Awake - RT, Jojo Gracia MD, 3 mL at 04/29/25 0817    Magnesium Standard Dose Replacement - Follow Nurse / BPA Driven Protocol, , Not Applicable, PRN, Jojo Garcia MD    melatonin tablet 2.5 mg, 2.5 mg, Oral, Nightly PRN, Jojo Garcia MD    miconazole (MICOTIN) 2 % powder 1 Application, 1 Application, Topical, Q12H, Jojo Garcia MD, 1 Application at 04/29/25 0841    montelukast (SINGULAIR) tablet 10 mg, 10 mg, Oral, Nightly, Jojo Garcia MD, 10 mg at 04/28/25 2057    ondansetron ODT (ZOFRAN-ODT) disintegrating tablet 4 mg, 4 mg, Oral, Q6H PRN **OR** ondansetron (ZOFRAN) injection 4 mg, 4 mg, Intravenous, Q6H PRN, Jojo Garcia MD    oxybutynin XL (DITROPAN-XL) 24 hr tablet 10 mg, 10 mg, Oral, BID, Jojo Garcia MD, 10 mg at 04/29/25 0841    Pharmacy to dose vancomycin, , Not Applicable, Continuous PRN, Mehul Miller MD    Pharmacy to dose warfarin, , Not Applicable, Continuous PRN, Jojo Garcia MD    Phosphorus Replacement - Follow Nurse / BPA Driven Protocol, , Not Applicable, PRNRadha Renate Andrea, MD    Potassium Replacement - Follow Nurse / BPA Driven Protocol, , Not Applicable, PRN, Jojo Garcia MD    rosuvastatin (CRESTOR) tablet 20 mg, 20 mg, Oral, Nightly, Jojo Garcia MD, 20 mg at 04/28/25 2057    [COMPLETED] Insert Peripheral IV, , , Once **AND** sodium chloride 0.9 % flush 10 mL, 10 mL, Intravenous, PRN, Noah Saunders MD    sodium chloride 7 % nebulizer solution nebulizer solution 4 mL, 4 mL, Nebulization, BID - RT, StinglJojo MD, 4 mL at 04/29/25 0818    Vancomycin HCl 1,250 mg in sodium chloride 0.9 % 250 mL VTB, 1,250 mg, Intravenous, Q24H,  Jojo Garcia MD, Last Rate: 200 mL/hr at 04/28/25 1409, 1,250 mg at 04/28/25 1409    warfarin (COUMADIN) tablet 2 mg, 2 mg, Oral, Daily, Jojo Garcia MD, 2 mg at 04/28/25 1749      Vital Signs  Temp:  [98.1 °F (36.7 °C)-98.8 °F (37.1 °C)] 98.1 °F (36.7 °C)  Heart Rate:  [80-82] 82  Resp:  [16-22] 16  BP: (101-114)/(54-69) 114/69    Physical Exam:  General: NAD, very nice, sitting up in bed  Cardiovascular: Normal rate  Respiratory: Breathing comfortably; no wheezing  GI: Not distended or tender  : No Mancilla catheter  Skin: Improved erythema RLE; RLE wound bandage; chronic LLE venous stasis changes     Results Review:    CBC, BMP, Vanco level, and blood cultures reviewed today  Lab Results   Component Value Date    WBC 14.35 (H) 04/29/2025    HGB 10.3 (L) 04/29/2025    HCT 32.5 (L) 04/29/2025    MCV 97.0 04/29/2025     (L) 04/29/2025     Lab Results   Component Value Date    GLUCOSE 144 (H) 04/29/2025    CALCIUM 8.4 04/29/2025     04/29/2025    K 3.8 04/29/2025    CO2 22.8 04/29/2025     04/29/2025    BUN 11 04/29/2025    CREATININE 0.73 04/29/2025    EGFR 78.2 04/29/2025    BCR 15.1 04/29/2025    ANIONGAP 9.2 04/29/2025     Lab Results   Component Value Date    CRP 11.48 (H) 04/28/2025     Lab Results   Component Value Date    VANCOTROUGH 12.70 04/28/2025    VANCORANDOM 25.60 08/27/2024       Microbiology:  4/25 BCx negative to date    Prior radiology:  RLE Doppler negative for DVT    Assessment & Plan   #1 RLE cellulitis  #2 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  Her blood cultures are negative to date.  Her right leg is subjectively improved.    For now, I recommend that she continue empiric vancomycin 1250 mg IV every 24 hours with goal -600 and ceftriaxone 2 g IV every 24 hours.  I will determine an oral antibiotic  regimen and duration closer to the time of discharge.  I will hold her chronic suppressive cephalexin for now given her broad-spectrum antibiotic use.  She expressed understanding when antibiotic education provided.    While the patient is on vancomycin, vancomycin levels will be ordered and reviewed with dose adjustments made as needed. The patient will have a daily creatinine level checked while on vancomycin as part of monitoring this medication.     ID will follow.  Antibiotic plan D/W primary team MD.

## 2025-04-29 NOTE — PROGRESS NOTES
Name: Caityln Longoria ADMIT: 2025   : 1934  PCP: Zenaida Suarez MD    MRN: [id number] LOS: 4 days   AGE/SEX: 89 y.o. female  ROOM: [facility]     Subjective   Subjective   Patient resting in bed, erythema and pain continued to improve in her leg.  She remains on IV antibiotics, white blood cell count remains elevated but is steadily trending down.       Objective   Objective   Vital Signs  Temp:  [98.1 °F (36.7 °C)-98.8 °F (37.1 °C)] 98.1 °F (36.7 °C)  Heart Rate:  [80-86] 81  Resp:  [16-22] 16  BP: (101-119)/(54-69) 119/60  SpO2:  [93 %-99 %] 96 %  on   ;   Device (Oxygen Therapy): room air  Body mass index is 31.48 kg/m².  Physical Exam  Constitutional:       General: She is not in acute distress.     Appearance: She is obese. She is ill-appearing (Chronically ill, frail).   HENT:      Head: Normocephalic and atraumatic.      Mouth/Throat:      Mouth: Mucous membranes are moist.      Pharynx: Oropharynx is clear.   Cardiovascular:      Rate and Rhythm: Normal rate and regular rhythm.   Pulmonary:      Effort: Pulmonary effort is normal. No respiratory distress.      Breath sounds: Normal breath sounds. No wheezing.   Abdominal:      General: Abdomen is flat.      Palpations: Abdomen is soft.      Tenderness: There is no abdominal tenderness.   Musculoskeletal:      Right lower leg: No edema.      Left lower leg: No edema.      Comments: Right>Left edema, 2+ pitting edema on right; 1+ on left  Erythema noted on RLE; chronic venous stasis changes bilaterally  All improving today- less erythema, less edema/resolved- weeping better   Skin:     General: Skin is warm and dry.   Neurological:      General: No focal deficit present.      Mental Status: She is alert and oriented to person, place, and time. Mental status is at baseline.         Results Review     I reviewed the patient's new clinical results.  Results from last 7 days   Lab Units 25  0259 25  0438 25  0338  "04/26/25  0603   WBC 10*3/mm3 14.35* 16.11* 17.66* 23.04*   HEMOGLOBIN g/dL 10.3* 10.6* 10.4* 11.1*   PLATELETS 10*3/mm3 108* 108* 97* 102*     Results from last 7 days   Lab Units 04/29/25  0259 04/28/25  0438 04/27/25  0336 04/26/25  2040 04/26/25  0604   SODIUM mmol/L 136 141 141  --  142   POTASSIUM mmol/L 3.8 4.1 4.2 4.4 2.9*   CHLORIDE mmol/L 104 109* 111*  --  107   CO2 mmol/L 22.8 23.0 22.5  --  24.0   BUN mg/dL 11 11 14  --  16   CREATININE mg/dL 0.73 0.68 0.71  --  0.57   GLUCOSE mg/dL 144* 125* 102*  --  40*   Estimated Creatinine Clearance: 51.5 mL/min (by C-G formula based on SCr of 0.73 mg/dL).  Results from last 7 days   Lab Units 04/27/25  0336 04/25/25  1237   ALBUMIN g/dL 2.5* 3.3*   BILIRUBIN mg/dL 0.8 1.3*   ALK PHOS U/L 129* 109   AST (SGOT) U/L 89* 23   ALT (SGPT) U/L 56* 45*     Results from last 7 days   Lab Units 04/29/25  0259 04/28/25  0438 04/27/25  0336 04/26/25  0604 04/25/25  1237   CALCIUM mg/dL 8.4 8.2 7.9* 7.8* 8.9   ALBUMIN g/dL  --   --  2.5*  --  3.3*     Results from last 7 days   Lab Units 04/25/25  1550 04/25/25  1237   PROCALCITONIN ng/mL  --  0.27*   LACTATE mmol/L 1.2 2.1*     COVID19   Date Value Ref Range Status   02/25/2025 Not Detected Not Detected - Ref. Range Final   09/27/2024 Not Detected Not Detected - Ref. Range Final     No results found for: \"HGBA1C\", \"POCGLU\"      Duplex Venous Lower Extremity - Right CAR    Normal right lower extremity venous duplex scan.    Scheduled Medications  budesonide, 0.5 mg, Nebulization, BID - RT  calcium 500 mg vitamin D 5 mcg (200 UT), 1 tablet, Oral, BID  cefTRIAXone, 2,000 mg, Intravenous, Q24H  cetirizine, 10 mg, Oral, BID  cholecalciferol, 1,000 Units, Oral, Daily  fluticasone, 1 spray, Nasal, Daily  guaiFENesin, 1,200 mg, Oral, Q12H  ipratropium-albuterol, 3 mL, Nebulization, Q6H While Awake - RT  Menthol-Zinc Oxide, 1 Application, Topical, Q12H  miconazole, 1 Application, Topical, Q12H  montelukast, 10 mg, Oral, " Nightly  oxybutynin XL, 10 mg, Oral, BID  rosuvastatin, 20 mg, Oral, Nightly  sodium chloride, 4 mL, Nebulization, BID - RT  vancomycin, 1,250 mg, Intravenous, Q24H  warfarin, 2 mg, Oral, Daily    Infusions  Pharmacy to dose vancomycin,   Pharmacy to dose warfarin,     Diet  Diet: Cardiac; Healthy Heart (2-3 Na+); Fluid Consistency: Thin (IDDSI 0)         I have personally reviewed:  [x]  Laboratory   []  Microbiology   []  Radiology   [x]  EKG/Telemetry   []  Cardiology/Vascular   []  Pathology   []  Records    Assessment/Plan     Active Hospital Problems    Diagnosis  POA    **Cellulitis of right lower extremity [L03.115]  Yes    Moderate protein-calorie malnutrition [E44.0]  Yes    Thrombocytopenia [D69.6]  Yes    Sick sinus syndrome [I49.5]  Yes    Chronic HFrEF (heart failure with reduced ejection fraction) [I50.22]  Yes    COPD (chronic obstructive pulmonary disease) [J44.9]  Yes    Bilateral carotid artery disease [I77.9]  Yes    Anticoagulated on Coumadin [Z79.01]  Not Applicable    CLL (chronic lymphocytic leukemia) [C91.10]  Yes    HTN (hypertension) [I10]  Yes    CKD (chronic kidney disease), stage III [N18.30]  Yes    Paroxysmal atrial fibrillation [I48.0]  Yes    Type 2 diabetes mellitus [E11.9]  Yes    HLD (hyperlipidemia) [E78.5]  Yes    Coronary artery disease [I25.10]  Yes      Resolved Hospital Problems   No resolved problems to display.       89 y.o. female admitted with Cellulitis of right lower extremity.    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 Miller  - patient developed RLE cellulitis while on suppressive keflex, have asked ID to consult  - pt initially with borderline low Bps 70s/40s- she responded well to IVF, did not meet sepsis criteria (WBC elevation only)  -RLE doppler NEGATIVE for DVT  - WBC is improving, blood cx are NGTD  - d/w Dr Miller- given persistent WBC elevation- recommends another few days IV abx with po abx plan at [place].      Atrial fibrillation on warfarin  S/p pacemaker  HTN  HLD  - pt was hypotensive on arrival to ED so her coreg and loop diuretic were held, she responded to IV fluids- continue to hold BP meds until BP is more improved/stable  - cont statin  - pharmacy to dose warfarin  - add back home coreg 4/29    COPD/chronic hypoxic respiratory failure on home O2  - not in exacerbation/at baseline  - continue home inhalers    CHARLENE on home bipap  - Patient reports that she is unable to bring her BiPAP machine in from home as she is worried it is going to be broken during transit which has happened previously  - she is requesting pulm consult for bipap    CLL- has never required tx/follows with Dr. Larkin at [facility]  Hypogammaglobulinemia on monthly IVIG    Weakness  - acute on chronic  - pt/ot consulted- likely need SNF at dc    Warfarin (home med) for DVT prophylaxis.  DNR.  Discussed with patient and nursing staff and Dr. Miller re: dc plan  Anticipated discharge SNF, Thursday or Friday        Lora Leroy MD  [facility]  04/29/25  14:46 EDT    I wore protective equipment throughout this patient encounter including gloves.  Hand hygiene was performed before donning protective equipment and after removal when leaving the room.         Copied text in this note has been reviewed and is accurate as of 04/29/25.

## 2025-04-29 NOTE — THERAPY TREATMENT NOTE
Patient Name: Caitlyn Longoria  : 1934    MRN: [id number]                              Today's Date: 2025       Admit Date: 2025    Visit Dx:     ICD-10-CM ICD-9-CM   1. Sepsis without acute organ dysfunction, due to unspecified organism  A41.9 038.9     995.91   2. Cellulitis of right lower extremity  L03.115 682.6   3. Leukocytosis, unspecified type  D72.829 288.60   4. Hypokalemia  E87.6 276.8   5. Pedal edema  R60.0 782.3     Patient Active Problem List   Diagnosis    Neck and shoulder pain    Arthropathy of shoulder region    Carpal tunnel syndrome of left wrist    Chronic pain of both shoulders    Chronic left shoulder pain    Arthropathy of left shoulder    Dysarthria    Type 2 diabetes mellitus    Paroxysmal atrial fibrillation    HLD (hyperlipidemia)    Chronic bronchitis    Coronary artery disease    CVA (cerebral vascular accident)    HTN (hypertension)    CKD (chronic kidney disease), stage III    Chronic combined systolic (congestive) and diastolic (congestive) heart failure    Infection of prosthetic right knee joint    Stasis dermatitis of right lower extremity due to peripheral venous hypertension    Current use of long term anticoagulation    Morbid obesity    Acute respiratory failure with hypoxia    Rhinovirus    Asthma with acute exacerbation    Acute respiratory failure with hypoxemia    Viral pneumonia    Hypoxia    CLL (chronic lymphocytic leukemia)    Dyspnea    Anticoagulated on Coumadin    Osteoporotic compression fracture of spine    Pneumonia due to gram-negative bacteria    Pneumonia due to infectious organism    Bilateral carotid artery disease    Hypogammaglobulinemia    Hypogammaglobulinemia    Cellulitis of right lower extremity    Hypokalemia    Nocturnal hypoxia    COPD (chronic obstructive pulmonary disease)    Upper respiratory tract infection    COPD (chronic obstructive pulmonary disease)    Sick sinus syndrome    Anemia    Thrombocytopenia    Chronic HFrEF  (heart failure with reduced ejection fraction)    Cellulitis of left lower leg    Acute exacerbation of COPD with asthma    Leukocytosis    COPD exacerbation    Moderate protein-calorie malnutrition     Past Medical History:   Diagnosis Date    Aortic calcification     mild, on echo 12/17/2017    Aortic regurgitation     Trace    Asthma     Atrial fibrillation     CAD (coronary artery disease)     Carpal tunnel syndrome of left wrist     Chronic combined systolic and diastolic congestive heart failure     CKD (chronic kidney disease) stage 3, GFR 30-59 ml/min     COPD (chronic obstructive pulmonary disease)     Coronary artery disease involving native coronary artery of native heart with angina pectoris     Disc degeneration, lumbar     Diverticulosis     DM type 2 (diabetes mellitus, type 2)     Dyslipidemia     GERD (gastroesophageal reflux disease)     History of aneurysm     right femoral artery s/p LHC    History of blood transfusion     History of fracture     History of heart attack     History of vitamin D deficiency     Hyperlipidemia     Hypertension     Leukemia     Mild mitral regurgitation     Mitral annular calcification     12/8/2017- echo, moderate    Osteopenia     PAF (paroxysmal atrial fibrillation)     Peripheral neuropathy     Skin cancer     Left hand    Sleep apnea     bipap    SSS (sick sinus syndrome)     Stroke (cerebrum)     TIA (transient ischemic attack) 2017    Tricuspid regurgitation     Trace     Past Surgical History:   Procedure Laterality Date    BRONCHOSCOPY Bilateral 10/6/2020    Procedure: BRONCHOSCOPY with BILATERAL LUNG washings;  Surgeon: Juno Coburn MD;  Location: [facility] ENDOSCOPY;  Service: Pulmonary;  Laterality: Bilateral;  PRE: purulent bronchitis  POST: PURULENT BRONCHITIS    BRONCHOSCOPY Bilateral 10/9/2020    Procedure: BRONCHOSCOPY with washing;  Surgeon: Juno Coburn MD;  Location: [facility] ENDOSCOPY;  Service: Pulmonary;  Laterality: Bilateral;  pre/post -  mucous plug      CARDIAC CATHETERIZATION      CARDIAC ELECTROPHYSIOLOGY PROCEDURE N/A 2/7/2020    Procedure: PPM generator change - dual  medtronic;  Surgeon: Kiel Field MD;  Location: [facility] CATH INVASIVE LOCATION;  Service: Cardiology;  Laterality: N/A;    CHOLECYSTECTOMY      CORONARY STENT PLACEMENT      ENDOSCOPY N/A 9/14/2022    Procedure: ESOPHAGOGASTRODUODENOSCOPY with 54fr main dilatation;  Surgeon: Enio Cota MD;  Location: [facility] ENDOSCOPY;  Service: Gastroenterology;  Laterality: N/A;  pre - dysphagia  post - s/p dilatation, watermelon stomach    HERNIA REPAIR      hital hernia    HYSTERECTOMY      PACEMAKER IMPLANTATION      REPLACEMENT TOTAL KNEE Bilateral       General Information       Row Name 04/29/25 1529          OT Time and Intention    Document Type therapy note (daily note)  -KR     Mode of Treatment individual therapy;occupational therapy  -KR     Patient Effort good  -KR     Symptoms Noted During/After Treatment shortness of breath  -KR       Row Name 04/29/25 1529          General Information    Existing Precautions/Restrictions fall  -KR     Barriers to Rehab medically complex  -KR       Row Name 04/29/25 1529          Cognition    Orientation Status (Cognition) oriented x 3  -KR       Row Name 04/29/25 1529          Safety Issues/Impairments Affecting Functional Mobility    Impairments Affecting Function (Mobility) balance;endurance/activity tolerance;strength;range of motion (ROM);cognition;coordination;pain;shortness of breath;motor control;postural/trunk control  -KR     Comment, Safety Issues/Impairments (Mobility) Gait belt and non-skid socks donned  -KR               User Key  (r) = Recorded By, (t) = Taken By, (c) = Cosigned By      Initials Name Provider Type    KR Devendra Pulido OT Occupational Therapist                     Mobility/ADL's       Row Name 04/29/25 1531          Bed Mobility    Bed Mobility supine-sit  -KR     Supine-Sit [place] (Bed  Mobility) minimum assist (75% patient effort);verbal cues  -KR     Bed Mobility, Safety Issues decreased use of legs for bridging/pushing  -KR     Assistive Device (Bed Mobility) head of bed elevated;bed rails  -KR     Comment, (Bed Mobility) counterweight provided  -KR       Row Name 04/29/25 1531          Transfers    Transfers sit-stand transfer;bed-chair transfer  -KR       Row Name 04/29/25 1531          Bed-Chair Transfer    Bed-Chair [place] (Transfers) minimum assist (75% patient effort);verbal cues  -KR     Assistive Device (Bed-Chair Transfers) walker, front-wheeled  -KR     Comment, (Bed-Chair Transfer) pt able to take ~4 steps to chair with RW and min A and cues  -KR       Row Name 04/29/25 1531          Sit-Stand Transfer    Sit-Stand [place] (Transfers) verbal cues;moderate assist (50% patient effort)  -KR     Assistive Device (Sit-Stand Transfers) walker, front-wheeled  -KR       Row Name 04/29/25 1531          Functional Mobility    Functional Mobility- Ind. Level minimum assist (75% patient effort);verbal cues required  -KR     Functional Mobility- Device walker, front-wheeled  -KR     Functional Mobility- Safety Issues balance decreased during turns  -KR       Row Name 04/29/25 1531          Activities of Daily Living    BADL Assessment/Intervention grooming  -KR       Row Name 04/29/25 1531          Hygiene Care    Oral Care teeth brushed - regular toothbrush  -KR       Row Name 04/29/25 1531          Grooming Assessment/Training    [place] Level (Grooming) grooming skills;hair care, combing/brushing;oral care regimen;wash face, hands;moderate assist (50% patient effort);minimum assist (75% patient effort);verbal cues  -KR     Position (Grooming) supported sitting  -KR     Comment, (Grooming) pt was able to complete oral care, washing hair with shower cap, and combing hair.  -KR               User Key  (r) = Recorded By, (t) = Taken By, (c) = Cosigned By      Initials Name Provider  Type    KR Devendra Pulido, AVANI Occupational Therapist                   Obj/Interventions    No documentation.                  Goals/Plan    No documentation.                  Clinical Impression       Row Name 04/29/25 1541          Pain Assessment    Pretreatment Pain Rating 2/10  -KR     Posttreatment Pain Rating 2/10  -KR     Pain Location back  -KR     Pain Side/Orientation lower  -KR     Pain Management Interventions exercise or physical activity utilized  -KR     Response to Pain Interventions functional ability unchanged  -KR     Pre/Posttreatment Pain Comment lower back pain  -KR       Row Name 04/29/25 1541          Plan of Care Review    Plan of Care Reviewed With patient  -KR     Progress improving  -KR     Outcome Evaluation pt was able to complete ~4 steps to chair with RW. Pt wtih cues for safety. Pt able to complete ADLs up in chair. Pt cont to benefit from OT at this time to promote safety, ADL IND, and functional mobility. Pt at this time is unsafe to return home and needs rehab.  -KR       Row Name 04/29/25 1541          Therapy Assessment/Plan (OT)    Rehab Potential (OT) good  -KR     Criteria for Skilled Therapeutic Interventions Met (OT) yes;meets criteria;skilled treatment is necessary  -KR     Therapy Frequency (OT) 5 times/wk  -KR       Row Name 04/29/25 1541          Therapy Plan Review/Discharge Plan (OT)    Anticipated Discharge Disposition (OT) skilled nursing facility;sub acute care setting  -KR       Row Name 04/29/25 1541          Vital Signs    Pre Patient Position Supine  -KR     Post Patient Position Sitting  -KR       Row Name 04/29/25 1541          Positioning and Restraints    Pre-Treatment Position in bed  -KR     Post Treatment Position chair  -KR     In Chair notified nsg;reclined;sitting;call light within reach;encouraged to call for assist;exit alarm on;legs elevated;waffle cushion  -KR               User Key  (r) = Recorded By, (t) = Taken By, (c) = Cosigned By       Initials Name Provider Type    Devendra Santos OT Occupational Therapist                   Outcome Measures       Row Name 04/29/25 1544          How much help from another is currently needed...    Putting on and taking off regular lower body clothing? 2  -KR     Bathing (including washing, rinsing, and drying) 2  -KR     Toileting (which includes using toilet bed pan or urinal) 2  -KR     Putting on and taking off regular upper body clothing 2  -KR     Taking care of personal grooming (such as brushing teeth) 2  -KR     Eating meals 3  -KR     AM-PAC 6 Clicks Score (OT) 13  -KR       Row Name 04/29/25 1544          Functional Assessment    Outcome Measure Options AM-PAC 6 Clicks Daily Activity (OT)  -KR               User Key  (r) = Recorded By, (t) = Taken By, (c) = Cosigned By      Initials Name Provider Type    Devendra Santos OT Occupational Therapist                    Occupational Therapy Education       Title: PT OT SLP Therapies (In Progress)       Topic: Occupational Therapy (In Progress)       Point: ADL training (Done)       Learning Progress Summary            Patient Acceptance, E, VU by TONI at 4/27/2025 3297    Comment: OT educ on OT role in therapeutic process and pt's POC.                                      User Key       Initials Effective Dates Name Provider Type Discipline    TONI 06/16/21 -  Malgorzata North OT Occupational Therapist OT                  OT Recommendation and Plan  Therapy Frequency (OT): 5 times/wk  Plan of Care Review  Plan of Care Reviewed With: patient  Progress: improving  Outcome Evaluation: pt was able to complete ~4 steps to chair with RW. Pt wtih cues for safety. Pt able to complete ADLs up in chair. Pt cont to benefit from OT at this time to promote safety, ADL IND, and functional mobility. Pt at this time is unsafe to return home and needs rehab.     Time Calculation:         Time Calculation- OT       Row Name 04/29/25 1545             Time Calculation- OT     OT Start Time 1410  -KR      OT Stop Time 1443  -KR      OT Time Calculation (min) 33 min  -KR      Total Timed Code Minutes- OT 33 minute(s)  -KR      OT Received On 04/29/25  -KR      OT - Next Appointment 04/30/25  -KR         Timed Charges    74183 - OT Therapeutic Activity Minutes 15  -KR      61851 - OT Self Care/Mgmt Minutes 18  -KR         Total Minutes    Timed Charges Total Minutes 33  -KR       Total Minutes 33  -KR                User Key  (r) = Recorded By, (t) = Taken By, (c) = Cosigned By      Initials Name Provider Type    KR Devendra Pulido OT Occupational Therapist                  Therapy Charges for Today       Code Description Service Date Service Provider Modifiers Qty    72185014974 HC OT THERAPEUTIC ACT EA 15 MIN 4/29/2025 Devendra Pulido OT GO 1    38419290736 HC OT SELF CARE/MGMT/TRAIN EA 15 MIN 4/29/2025 Devendra Pulido OT GO 1                 Devendra Pulido OT  4/29/2025

## 2025-04-30 LAB
ANION GAP SERPL CALCULATED.3IONS-SCNC: 9.8 MMOL/L (ref 5–15)
BACTERIA SPEC AEROBE CULT: NORMAL
BACTERIA SPEC AEROBE CULT: NORMAL
BUN SERPL-MCNC: 12 MG/DL (ref 8–23)
BUN/CREAT SERPL: 15.6 (ref 7–25)
CALCIUM SPEC-SCNC: 8.7 MG/DL (ref 8.2–9.6)
CHLORIDE SERPL-SCNC: 104 MMOL/L (ref 98–107)
CO2 SERPL-SCNC: 25.2 MMOL/L (ref 22–29)
CREAT SERPL-MCNC: 0.77 MG/DL (ref 0.57–1)
DEPRECATED RDW RBC AUTO: 48.7 FL (ref 37–54)
EGFRCR SERPLBLD CKD-EPI 2021: 73.4 ML/MIN/1.73
ERYTHROCYTE [DISTWIDTH] IN BLOOD BY AUTOMATED COUNT: 13.3 % (ref 12.3–15.4)
GLUCOSE SERPL-MCNC: 169 MG/DL (ref 65–99)
HCT VFR BLD AUTO: 33.3 % (ref 34–46.6)
HGB BLD-MCNC: 10.5 G/DL (ref 12–15.9)
INR PPP: 2.51 (ref 0.9–1.1)
MCH RBC QN AUTO: 31.3 PG (ref 26.6–33)
MCHC RBC AUTO-ENTMCNC: 31.5 G/DL (ref 31.5–35.7)
MCV RBC AUTO: 99.4 FL (ref 79–97)
PLATELET # BLD AUTO: 113 10*3/MM3 (ref 140–450)
PMV BLD AUTO: 10.3 FL (ref 6–12)
POTASSIUM SERPL-SCNC: 3.6 MMOL/L (ref 3.5–5.2)
POTASSIUM SERPL-SCNC: 4.4 MMOL/L (ref 3.5–5.2)
PROTHROMBIN TIME: 27.3 SECONDS (ref 11.7–14.2)
RBC # BLD AUTO: 3.35 10*6/MM3 (ref 3.77–5.28)
SODIUM SERPL-SCNC: 139 MMOL/L (ref 136–145)
WBC NRBC COR # BLD AUTO: 15.54 10*3/MM3 (ref 3.4–10.8)

## 2025-04-30 PROCEDURE — 97110 THERAPEUTIC EXERCISES: CPT

## 2025-04-30 PROCEDURE — 84132 ASSAY OF SERUM POTASSIUM: CPT | Performed by: HOSPITALIST

## 2025-04-30 PROCEDURE — 85610 PROTHROMBIN TIME: CPT | Performed by: STUDENT IN AN ORGANIZED HEALTH CARE EDUCATION/TRAINING PROGRAM

## 2025-04-30 PROCEDURE — 94799 UNLISTED PULMONARY SVC/PX: CPT

## 2025-04-30 PROCEDURE — 94660 CPAP INITIATION&MGMT: CPT

## 2025-04-30 PROCEDURE — 25010000002 CEFTRIAXONE PER 250 MG: Performed by: STUDENT IN AN ORGANIZED HEALTH CARE EDUCATION/TRAINING PROGRAM

## 2025-04-30 PROCEDURE — 85027 COMPLETE CBC AUTOMATED: CPT | Performed by: STUDENT IN AN ORGANIZED HEALTH CARE EDUCATION/TRAINING PROGRAM

## 2025-04-30 PROCEDURE — 99233 SBSQ HOSP IP/OBS HIGH 50: CPT | Performed by: INTERNAL MEDICINE

## 2025-04-30 PROCEDURE — 94761 N-INVAS EAR/PLS OXIMETRY MLT: CPT

## 2025-04-30 PROCEDURE — 94664 DEMO&/EVAL PT USE INHALER: CPT

## 2025-04-30 PROCEDURE — G0545 PR INHERENT VISIT TO INPT: HCPCS | Performed by: INTERNAL MEDICINE

## 2025-04-30 PROCEDURE — 25010000002 VANCOMYCIN HCL 1.25 G RECONSTITUTED SOLUTION 1 EACH VIAL: Performed by: INTERNAL MEDICINE

## 2025-04-30 PROCEDURE — 25810000003 SODIUM CHLORIDE 0.9 % SOLUTION 250 ML FLEX CONT: Performed by: INTERNAL MEDICINE

## 2025-04-30 PROCEDURE — 97530 THERAPEUTIC ACTIVITIES: CPT

## 2025-04-30 PROCEDURE — 80048 BASIC METABOLIC PNL TOTAL CA: CPT | Performed by: STUDENT IN AN ORGANIZED HEALTH CARE EDUCATION/TRAINING PROGRAM

## 2025-04-30 RX ORDER — POTASSIUM CHLORIDE 1500 MG/1
40 TABLET, EXTENDED RELEASE ORAL EVERY 4 HOURS
Status: COMPLETED | OUTPATIENT
Start: 2025-04-30 | End: 2025-04-30

## 2025-04-30 RX ADMIN — POTASSIUM CHLORIDE 40 MEQ: 1500 TABLET, EXTENDED RELEASE ORAL at 06:29

## 2025-04-30 RX ADMIN — MONTELUKAST 10 MG: 10 TABLET, FILM COATED ORAL at 22:01

## 2025-04-30 RX ADMIN — ANTI-FUNGAL POWDER MICONAZOLE NITRATE TALC FREE 1 APPLICATION: 1.42 POWDER TOPICAL at 09:14

## 2025-04-30 RX ADMIN — Medication 1 APPLICATION: at 22:01

## 2025-04-30 RX ADMIN — BUDESONIDE 0.5 MG: 0.5 INHALANT RESPIRATORY (INHALATION) at 21:04

## 2025-04-30 RX ADMIN — SODIUM CHLORIDE 2000 MG: 9 INJECTION, SOLUTION INTRAVENOUS at 17:47

## 2025-04-30 RX ADMIN — CETIRIZINE HYDROCHLORIDE 10 MG: 10 TABLET ORAL at 22:01

## 2025-04-30 RX ADMIN — VANCOMYCIN HYDROCHLORIDE 1250 MG: 1.25 INJECTION, POWDER, LYOPHILIZED, FOR SOLUTION INTRAVENOUS at 15:37

## 2025-04-30 RX ADMIN — GUAIFENESIN 1200 MG: 600 TABLET, EXTENDED RELEASE ORAL at 09:13

## 2025-04-30 RX ADMIN — FLUTICASONE PROPIONATE 1 SPRAY: 50 SPRAY, METERED NASAL at 09:14

## 2025-04-30 RX ADMIN — Medication 1 APPLICATION: at 04:04

## 2025-04-30 RX ADMIN — Medication 1 APPLICATION: at 09:14

## 2025-04-30 RX ADMIN — GUAIFENESIN 1200 MG: 600 TABLET, EXTENDED RELEASE ORAL at 22:01

## 2025-04-30 RX ADMIN — Medication 1000 UNITS: at 09:13

## 2025-04-30 RX ADMIN — Medication 4 ML: at 21:06

## 2025-04-30 RX ADMIN — ROSUVASTATIN CALCIUM 20 MG: 10 TABLET, FILM COATED ORAL at 22:01

## 2025-04-30 RX ADMIN — ANTI-FUNGAL POWDER MICONAZOLE NITRATE TALC FREE 1 APPLICATION: 1.42 POWDER TOPICAL at 04:04

## 2025-04-30 RX ADMIN — WARFARIN 2 MG: 2 TABLET ORAL at 17:47

## 2025-04-30 RX ADMIN — IPRATROPIUM BROMIDE AND ALBUTEROL SULFATE 3 ML: .5; 3 SOLUTION RESPIRATORY (INHALATION) at 12:32

## 2025-04-30 RX ADMIN — CALCIUM CARBONATE-VITAMIN D TAB 500 MG-200 UNIT 1 TABLET: 500-200 TAB at 09:13

## 2025-04-30 RX ADMIN — Medication 4 ML: at 08:46

## 2025-04-30 RX ADMIN — CALCIUM CARBONATE-VITAMIN D TAB 500 MG-200 UNIT 1 TABLET: 500-200 TAB at 22:01

## 2025-04-30 RX ADMIN — ANTI-FUNGAL POWDER MICONAZOLE NITRATE TALC FREE 1 APPLICATION: 1.42 POWDER TOPICAL at 22:01

## 2025-04-30 RX ADMIN — BUDESONIDE 0.5 MG: 0.5 INHALANT RESPIRATORY (INHALATION) at 08:46

## 2025-04-30 RX ADMIN — OXYBUTYNIN CHLORIDE 10 MG: 10 TABLET, EXTENDED RELEASE ORAL at 09:13

## 2025-04-30 RX ADMIN — CETIRIZINE HYDROCHLORIDE 10 MG: 10 TABLET ORAL at 09:13

## 2025-04-30 RX ADMIN — POTASSIUM CHLORIDE 40 MEQ: 1500 TABLET, EXTENDED RELEASE ORAL at 13:29

## 2025-04-30 RX ADMIN — OXYBUTYNIN CHLORIDE 10 MG: 10 TABLET, EXTENDED RELEASE ORAL at 22:01

## 2025-04-30 RX ADMIN — IPRATROPIUM BROMIDE AND ALBUTEROL SULFATE 3 ML: .5; 3 SOLUTION RESPIRATORY (INHALATION) at 08:46

## 2025-04-30 RX ADMIN — IPRATROPIUM BROMIDE AND ALBUTEROL SULFATE 3 ML: .5; 3 SOLUTION RESPIRATORY (INHALATION) at 21:02

## 2025-04-30 NOTE — PROGRESS NOTES
Name: Caitlyn Longoria ADMIT: 2025   : 1934  PCP: Zenaida Suarez MD    MRN: [id number] LOS: 5 days   AGE/SEX: 89 y.o. female  ROOM: [facility]     Subjective   Subjective   Denies any new complaints. No chest pain. Shortness of breath is chronic.     Objective   Objective   Vital Signs  Temp:  [98.1 °F (36.7 °C)-98.2 °F (36.8 °C)] 98.1 °F (36.7 °C)  Heart Rate:  [79-86] 80  Resp:  [16-30] 20  BP: (107-119)/(55-66) 107/66  SpO2:  [92 %-97 %] 96 %  on   ;   Device (Oxygen Therapy): room air  Body mass index is 31.04 kg/m².    Physical Exam  Constitutional:       General: She is not in acute distress.     Appearance: She is ill-appearing. She is not toxic-appearing.   HENT:      Head: Normocephalic and atraumatic.   Cardiovascular:      Rate and Rhythm: Normal rate and regular rhythm.   Pulmonary:      Effort: Pulmonary effort is normal. No respiratory distress.      Breath sounds: No wheezing.   Abdominal:      General: Bowel sounds are normal.      Palpations: Abdomen is soft.      Tenderness: There is no abdominal tenderness. There is no guarding or rebound.   Musculoskeletal:      Comments: Right lower extremity dressing   Skin:     General: Skin is warm and dry.   Neurological:      General: No focal deficit present.      Mental Status: She is alert and oriented to person, place, and time.   Psychiatric:         Mood and Affect: Mood normal.         Behavior: Behavior normal.     Results Review  I reviewed the patient's new clinical results.  Results from last 7 days   Lab Units 25  03425  0259 25  0438 25  0336   WBC 10*3/mm3 15.54* 14.35* 16.11* 17.66*   HEMOGLOBIN g/dL 10.5* 10.3* 10.6* 10.4*   PLATELETS 10*3/mm3 113* 108* 108* 97*     Results from last 7 days   Lab Units 25  0349 25  0259 25  0438 25  0336   SODIUM mmol/L 139 136 141 141   POTASSIUM mmol/L 3.6 3.8 4.1 4.2   CHLORIDE mmol/L 104 104 109* 111*   CO2 mmol/L 25.2 22.8 23.0 22.5  "  BUN mg/dL 12 11 11 14   CREATININE mg/dL 0.77 0.73 0.68 0.71   GLUCOSE mg/dL 169* 144* 125* 102*     Lab Results   Component Value Date    ANIONGAP 9.8 04/30/2025     Estimated Creatinine Clearance: 48.4 mL/min (by C-G formula based on SCr of 0.77 mg/dL).   Lab Results   Component Value Date    EGFR 73.4 04/30/2025     Results from last 7 days   Lab Units 04/27/25  0336 04/25/25  1237   ALBUMIN g/dL 2.5* 3.3*   BILIRUBIN mg/dL 0.8 1.3*   ALK PHOS U/L 129* 109   AST (SGOT) U/L 89* 23   ALT (SGPT) U/L 56* 45*     Results from last 7 days   Lab Units 04/30/25  0349 04/29/25  0259 04/28/25  0438 04/27/25  0336 04/26/25  0604 04/25/25  1237   CALCIUM mg/dL 8.7 8.4 8.2 7.9*   < > 8.9   ALBUMIN g/dL  --   --   --  2.5*  --  3.3*    < > = values in this interval not displayed.     Results from last 7 days   Lab Units 04/25/25  1550 04/25/25  1237   PROCALCITONIN ng/mL  --  0.27*   LACTATE mmol/L 1.2 2.1*     No results found for: \"HGBA1C\", \"POCGLU\"    No radiology results for the last day    Results from last 7 days   Lab Units 04/30/25  0350 04/29/25  0259 04/28/25  0438   INR  2.51* 2.18* 2.06*      Scheduled Meds  budesonide, 0.5 mg, Nebulization, BID - RT  calcium 500 mg vitamin D 5 mcg (200 UT), 1 tablet, Oral, BID  cefTRIAXone, 2,000 mg, Intravenous, Q24H  cetirizine, 10 mg, Oral, BID  cholecalciferol, 1,000 Units, Oral, Daily  fluticasone, 1 spray, Nasal, Daily  guaiFENesin, 1,200 mg, Oral, Q12H  ipratropium-albuterol, 3 mL, Nebulization, Q6H While Awake - RT  Menthol-Zinc Oxide, 1 Application, Topical, Q12H  miconazole, 1 Application, Topical, Q12H  montelukast, 10 mg, Oral, Nightly  oxybutynin XL, 10 mg, Oral, BID  potassium chloride ER, 40 mEq, Oral, Q4H  rosuvastatin, 20 mg, Oral, Nightly  sodium chloride, 4 mL, Nebulization, BID - RT  vancomycin, 1,250 mg, Intravenous, Q24H  warfarin, 2 mg, Oral, Daily    Continuous Infusions  Pharmacy to dose vancomycin,   Pharmacy to dose warfarin,     PRN Meds    " acetaminophen **OR** acetaminophen **OR** acetaminophen    albuterol    senna-docusate sodium **AND** polyethylene glycol **AND** bisacodyl **AND** bisacodyl    Calcium Replacement - Follow Nurse / BPA Driven Protocol    Magnesium Standard Dose Replacement - Follow Nurse / BPA Driven Protocol    melatonin    ondansetron ODT **OR** ondansetron    Pharmacy to dose vancomycin    Pharmacy to dose warfarin    Phosphorus Replacement - Follow Nurse / BPA Driven Protocol    Potassium Replacement - Follow Nurse / BPA Driven Protocol    [COMPLETED] Insert Peripheral IV **AND** sodium chloride    Pharmacy to dose vancomycin,   Pharmacy to dose warfarin,     Diet  Diet: Cardiac; Healthy Heart (2-3 Na+); Fluid Consistency: Thin (IDDSI 0)       Assessment/Plan     Active Hospital Problems    Diagnosis  POA    **Cellulitis of right lower extremity [L03.115]  Yes    Moderate protein-calorie malnutrition [E44.0]  Yes    Thrombocytopenia [D69.6]  Yes    Sick sinus syndrome [I49.5]  Yes    Chronic HFrEF (heart failure with reduced ejection fraction) [I50.22]  Yes    COPD (chronic obstructive pulmonary disease) [J44.9]  Yes    Bilateral carotid artery disease [I77.9]  Yes    Anticoagulated on Coumadin [Z79.01]  Not Applicable    CLL (chronic lymphocytic leukemia) [C91.10]  Yes    HTN (hypertension) [I10]  Yes    CKD (chronic kidney disease), stage III [N18.30]  Yes    Paroxysmal atrial fibrillation [I48.0]  Yes    Type 2 diabetes mellitus [E11.9]  Yes    HLD (hyperlipidemia) [E78.5]  Yes    Coronary artery disease [I25.10]  Yes      Resolved Hospital Problems   No resolved problems to display.     Patient is a 89 y.o. 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 Miller  - patient developed RLE cellulitis while on suppressive keflex  - pt initially with borderline low Bps 70s/40s- she responded well to IVF, did not meet sepsis criteria (WBC elevation only)  -RLE doppler NEGATIVE for  DVT  - Monitoring WBC, blood cx are NGTD  - Continue couple more days IV abx with po abx plan at [facility].      Atrial fibrillation on warfarin  S/p pacemaker  HTN  HLD  - pt was hypotensive on arrival to ED so her coreg and loop diuretic were held, she responded to IV fluids- continue to hold BP meds until BP is more improved/stable-low normal currently  - cont statin  - pharmacy to dose warfarin  - add back home coreg 4/29     COPD/chronic hypoxic respiratory failure on home O2  - not in exacerbation/at baseline  - continue home inhalers     CHARLENE on home bipap  - Patient reports that she is unable to bring her BiPAP machine in from home as she is worried it is going to be broken during transit which has happened previously  - Pulmonology following     CLL- has never required tx/follows with Dr. Larkin at [facility]  Hypogammaglobulinemia on monthly IVIG     Weakness  - acute on chronic  - Planning for SNF    DVT prophylaxis  Warfarin (home med)    Discharge  A couple days SNF  Expected Discharge Date: 5/2/2025; Expected Discharge Time:     Discussed with patient, nursing staff, care team on multidisciplinary rounds, and CCP    Steve Khan MD  [facility]  04/30/25 10:38 EDT

## 2025-04-30 NOTE — PLAN OF CARE
Goal Outcome Evaluation:  Plan of Care Reviewed With: patient        Progress: improving  Outcome Evaluation: Vital signs stable. No c/o pain. Room air. BiPap at night during sleep. Rt leg dressing remains in place. Morning labs returned WBC 15.54, potassium 3.6, replacement initiated. Potassium recheck 1510. Safety maintained. Possible discharge to SNF 4/30.

## 2025-04-30 NOTE — PLAN OF CARE
Problem: Adult Inpatient Plan of Care  Goal: Plan of Care Review  Outcome: Progressing  Flowsheets (Taken 4/30/2025 1720)  Progress: improving  Outcome Evaluation: Pt vitals stable. Up to chair for dinner. Encourage pt to use flutter valve. Pt had episode of choking on sputum. Can't quite cough it up and feels like she's choking. Dr Romero requested speech to eval pt. Q 2 turn. IV abx continued. Pt safety maintained  Plan of Care Reviewed With: patient

## 2025-04-30 NOTE — PLAN OF CARE
Goal Outcome Evaluation:  Plan of Care Reviewed With: patient           Outcome Evaluation: Continued weakness with impaired balance during PT today.  Able to stand and transfer bed>chair w/ min A using RW the  first stand.  For toileting needs, pt stood and transferred 2 more times, w/ declined independence each time due to increase posterior lean.  Required min-mod A x2 with walker and frequent cues to transfer off BSC to recliner.   Discussed pt assist level w/ RN and aid.  Recommend Dc to SNU.    Anticipated Discharge Disposition (PT): skilled nursing facility

## 2025-04-30 NOTE — PROGRESS NOTES
[facility] INPATIENT PROGRESS NOTE         [address]    2025      PATIENT IDENTIFICATION:  Name: Caitlyn Longoria ADMIT: 2025   : 1934  PCP: Zenaida Suarez MD    MRN: [id number] LOS: 5 days   AGE/SEX: 89 y.o. female  ROOM: [facility]                     LOS 5    Reason for visit: CHARLENE      SUBJECTIVE:      Resting comfortably.  Denies chest pain or productive cough.  She feels that her leg discomfort is improving.  Slept well with BiPAP last night.      Objective   OBJECTIVE:    Vital Sign Min/Max for last 24 hours  Temp  Min: 98.1 °F (36.7 °C)  Max: 98.2 °F (36.8 °C)   BP  Min: 107/66  Max: 119/60   Pulse  Min: 79  Max: 86   Resp  Min: 16  Max: 30   SpO2  Min: 92 %  Max: 97 %   No data recorded   Weight  Min: 79.5 kg (175 lb 3.2 oz)  Max: 79.5 kg (175 lb 3.2 oz)    Vitals:    25 2256 25 0401 25 0617 25 0743   BP:    107/66   BP Location:    Left arm   Patient Position:    Lying   Pulse: 80   79   Resp: (!) 30   22   Temp:    98.1 °F (36.7 °C)   TempSrc:    Oral   SpO2: 96% 97%  96%   Weight:   79.5 kg (175 lb 3.2 oz)    Height:                25  0637 25  0600 25  0617   Weight: 83.1 kg (183 lb 3.2 oz) 80.6 kg (177 lb 11.1 oz) 79.5 kg (175 lb 3.2 oz)       Body mass index is 31.04 kg/m².                          Body mass index is 31.04 kg/m².    Intake/Output Summary (Last 24 hours) at 2025 0831  Last data filed at 2025 0743  Gross per 24 hour   Intake 240 ml   Output 225 ml   Net 15 ml         Exam:  GEN:  No distress, appears stated age  EYES:   PERRL, anicteric sclerae  ENT:    External ears/nose normal, OP clear  NECK:  No adenopathy, midline trachea  LUNGS: Normal chest on inspection, palpation and auscultation  CV:  Normal S1S2, without murmur  ABD:  Nontender, nondistended, no hepatosplenomegaly, +BS  EXT:  No edema.  No cyanosis or clubbing.  No mottling and normal cap refill.    Assessment     Scheduled  meds:  budesonide, 0.5 mg, Nebulization, BID - RT  calcium 500 mg vitamin D 5 mcg (200 UT), 1 tablet, Oral, BID  cefTRIAXone, 2,000 mg, Intravenous, Q24H  cetirizine, 10 mg, Oral, BID  cholecalciferol, 1,000 Units, Oral, Daily  fluticasone, 1 spray, Nasal, Daily  guaiFENesin, 1,200 mg, Oral, Q12H  ipratropium-albuterol, 3 mL, Nebulization, Q6H While Awake - RT  Menthol-Zinc Oxide, 1 Application, Topical, Q12H  miconazole, 1 Application, Topical, Q12H  montelukast, 10 mg, Oral, Nightly  oxybutynin XL, 10 mg, Oral, BID  potassium chloride ER, 40 mEq, Oral, Q4H  rosuvastatin, 20 mg, Oral, Nightly  sodium chloride, 4 mL, Nebulization, BID - RT  warfarin, 2 mg, Oral, Daily      IV meds:                      Pharmacy to dose vancomycin,   Pharmacy to dose warfarin,       Data Review:  Results from last 7 days   Lab Units 04/30/25 0349 04/29/25 0259 04/28/25 0438 04/27/25 0336 04/26/25 2040 04/26/25  0604   SODIUM mmol/L 139 136 141 141  --  142   POTASSIUM mmol/L 3.6 3.8 4.1 4.2 4.4 2.9*   CHLORIDE mmol/L 104 104 109* 111*  --  107   CO2 mmol/L 25.2 22.8 23.0 22.5  --  24.0   BUN mg/dL 12 11 11 14  --  16   CREATININE mg/dL 0.77 0.73 0.68 0.71  --  0.57   GLUCOSE mg/dL 169* 144* 125* 102*  --  40*   CALCIUM mg/dL 8.7 8.4 8.2 7.9*  --  7.8*         Estimated Creatinine Clearance: 48.4 mL/min (by C-G formula based on SCr of 0.77 mg/dL).  Results from last 7 days   Lab Units 04/30/25 0349 04/29/25 0259 04/28/25 0438 04/27/25  0336 04/26/25  0603   WBC 10*3/mm3 15.54* 14.35* 16.11* 17.66* 23.04*   HEMOGLOBIN g/dL 10.5* 10.3* 10.6* 10.4* 11.1*   PLATELETS 10*3/mm3 113* 108* 108* 97* 102*     Results from last 7 days   Lab Units 04/30/25  0350 04/29/25  0259 04/28/25  0438 04/27/25  1224 04/26/25  0552   INR  2.51* 2.18* 2.06* 1.89* 1.77*     Results from last 7 days   Lab Units 04/27/25  0336 04/25/25  1237   ALT (SGPT) U/L 56* 45*   AST (SGOT) U/L 89* 23         Results from last 7 days   Lab Units 04/25/25  6050  "04/25/25  1237   PROCALCITONIN ng/mL  --  0.27*   LACTATE mmol/L 1.2 2.1*         No results found for: \"HGBA1C\", \"POCGLU\"        Imaging reviewed  Chest x-ray 4/25 reviewed: No focal consolidation.  Cardiomegaly noted.    Lower extremity Doppler 4/26 reviewed: Normal right lower extremity    Microbiology reviewed  NGTD          Active Hospital Problems    Diagnosis  POA    **Cellulitis of right lower extremity [L03.115]  Yes    Moderate protein-calorie malnutrition [E44.0]  Yes    Thrombocytopenia [D69.6]  Yes    Sick sinus syndrome [I49.5]  Yes    Chronic HFrEF (heart failure with reduced ejection fraction) [I50.22]  Yes    COPD (chronic obstructive pulmonary disease) [J44.9]  Yes    Bilateral carotid artery disease [I77.9]  Yes    Anticoagulated on Coumadin [Z79.01]  Not Applicable    CLL (chronic lymphocytic leukemia) [C91.10]  Yes    HTN (hypertension) [I10]  Yes    CKD (chronic kidney disease), stage III [N18.30]  Yes    Paroxysmal atrial fibrillation [I48.0]  Yes    Type 2 diabetes mellitus [E11.9]  Yes    HLD (hyperlipidemia) [E78.5]  Yes    Coronary artery disease [I25.10]  Yes      Resolved Hospital Problems   No resolved problems to display.         ASSESSMENT:  CHARLENE  Severe persistent asthma  Hypogammaglobulinemia  Cellulitis right lower extremity      PLAN:  Encourage positive airway pressure with all sleep.  On BiPAP 14/11 with 2 L of oxygen bled in at home.  Bronchodilators as needed for asthma symptoms.  Currently not in a exacerbation.  Antibiotics for cellulitis per admitting service.      Santiago Romero MD  Pulmonary and Critical Care Medicine  [facility], [facility]  4/30/2025    08:31 EDT     "

## 2025-04-30 NOTE — PROGRESS NOTES
LOS: 5 days     Chief Complaint: Right lower extremity cellulitis    Interval History: No acute events.  No fever.  She says her right leg is feeling better.  It is wrapped today.  No side effects from ceftriaxone or vancomycin.  She is currently getting a breathing treatment.    Medications:    Current Facility-Administered Medications:     acetaminophen (TYLENOL) tablet 650 mg, 650 mg, Oral, Q4H PRN **OR** acetaminophen (TYLENOL) 160 MG/5ML oral solution 650 mg, 650 mg, Oral, Q4H PRN **OR** acetaminophen (TYLENOL) suppository 650 mg, 650 mg, Rectal, Q4H PRN, Jojo Garcia MD    albuterol (PROVENTIL) nebulizer solution 0.083% 2.5 mg/3mL, 2.5 mg, Nebulization, Q6H PRN, Jojo Garcia MD, 2.5 mg at 04/27/25 1545    sennosides-docusate (PERICOLACE) 8.6-50 MG per tablet 2 tablet, 2 tablet, Oral, BID PRN **AND** polyethylene glycol (MIRALAX) packet 17 g, 17 g, Oral, Daily PRN **AND** bisacodyl (DULCOLAX) EC tablet 5 mg, 5 mg, Oral, Daily PRN **AND** bisacodyl (DULCOLAX) suppository 10 mg, 10 mg, Rectal, Daily PRN, Jojo Garcia MD    budesonide (PULMICORT) nebulizer solution 0.5 mg, 0.5 mg, Nebulization, BID - RT, Jojo Garcia MD, 0.5 mg at 04/30/25 0846    calcium 500 mg vitamin D 5 mcg (200 UT) per tablet 1 tablet, 1 tablet, Oral, BID, Jojo Garcia MD, 1 tablet at 04/29/25 2020    Calcium Replacement - Follow Nurse / BPA Driven Protocol, , Not Applicable, PRN, Jojo Garcia MD    cefTRIAXone (ROCEPHIN) 2,000 mg in sodium chloride 0.9 % 100 mL MBP, 2,000 mg, Intravenous, Q24H, Lora Leroy MD, Last Rate: 200 mL/hr at 04/29/25 1633, 2,000 mg at 04/29/25 1633    cetirizine (zyrTEC) tablet 10 mg, 10 mg, Oral, BID, Lora Leroy MD, 10 mg at 04/29/25 2020    cholecalciferol (VITAMIN D3) tablet 1,000 Units, 1,000 Units, Oral, Daily, Stingl, Jojo Landaverde MD, 1,000 Units at 04/29/25 0841    fluticasone (FLONASE) 50 MCG/ACT nasal spray 1 spray, 1 spray,  Nasal, Daily, Lora Leroy MD, 1 spray at 04/28/25 0838    guaiFENesin (MUCINEX) 12 hr tablet 1,200 mg, 1,200 mg, Oral, Q12H, Lora Leroy MD, 1,200 mg at 04/29/25 2020    ipratropium-albuterol (DUO-NEB) nebulizer solution 3 mL, 3 mL, Nebulization, Q6H While Awake - RT, Jojo Garcia MD, 3 mL at 04/30/25 0846    Magnesium Standard Dose Replacement - Follow Nurse / BPA Driven Protocol, , Not Applicable, PRN, Jojo Garcia MD    melatonin tablet 2.5 mg, 2.5 mg, Oral, Nightly PRN, Jojo Garcia MD    Menthol-Zinc Oxide 1 Application, 1 Application, Topical, Q12H, Lora Leroy MD, 1 Application at 04/30/25 0404    miconazole (MICOTIN) 2 % powder 1 Application, 1 Application, Topical, Q12H, Jojo Garcia MD, 1 Application at 04/30/25 0404    montelukast (SINGULAIR) tablet 10 mg, 10 mg, Oral, Nightly, Jojo Garcia MD, 10 mg at 04/29/25 2020    ondansetron ODT (ZOFRAN-ODT) disintegrating tablet 4 mg, 4 mg, Oral, Q6H PRN **OR** ondansetron (ZOFRAN) injection 4 mg, 4 mg, Intravenous, Q6H PRN, Jojo Garcia MD    oxybutynin XL (DITROPAN-XL) 24 hr tablet 10 mg, 10 mg, Oral, BID, Jojo Garcia MD, 10 mg at 04/29/25 2020    Pharmacy to dose vancomycin, , Not Applicable, Continuous PRN, Mehul Miller MD    Pharmacy to dose warfarin, , Not Applicable, Continuous PRN, Jojo Garcia MD    Phosphorus Replacement - Follow Nurse / BPA Driven Protocol, , Not Applicable, PRN, Jojo Garcia MD    potassium chloride (KLOR-CON M20) CR tablet 40 mEq, 40 mEq, Oral, Q4H, Steve Khan MD, 40 mEq at 04/30/25 0629    Potassium Replacement - Follow Nurse / BPA Driven Protocol, , Not Applicable, PRN, Jojo Garcia MD    rosuvastatin (CRESTOR) tablet 20 mg, 20 mg, Oral, Nightly, Jojo Garcia MD, 20 mg at 04/29/25 2020    [COMPLETED] Insert Peripheral IV, , , Once **AND** sodium chloride 0.9 % flush 10 mL, 10  mL, Intravenous, PRN, Noah Saunders MD    sodium chloride 7 % nebulizer solution nebulizer solution 4 mL, 4 mL, Nebulization, BID - RT, Jojo Garcia MD, 4 mL at 04/30/25 0846    warfarin (COUMADIN) tablet 2 mg, 2 mg, Oral, Daily, Jojo Garcia MD, 2 mg at 04/29/25 1720      Vital Signs  Temp:  [98.1 °F (36.7 °C)-98.2 °F (36.8 °C)] 98.1 °F (36.7 °C)  Heart Rate:  [79-86] 80  Resp:  [16-30] 20  BP: (107-119)/(55-66) 107/66    Physical Exam:  General: NAD, very nice, sitting up in bed  Cardiovascular: NR  Respiratory: Breathing comfortably while getting a breathing treatment  GI: Not distended or tender; soft  : No Mancilla catheter  Skin: BLEs wrapped today     Results Review:    CBC, BMP, CRP, and blood cultures reviewed today  Lab Results   Component Value Date    WBC 15.54 (H) 04/30/2025    HGB 10.5 (L) 04/30/2025    HCT 33.3 (L) 04/30/2025    MCV 99.4 (H) 04/30/2025     (L) 04/30/2025     Lab Results   Component Value Date    GLUCOSE 169 (H) 04/30/2025    CALCIUM 8.7 04/30/2025     04/30/2025    K 3.6 04/30/2025    CO2 25.2 04/30/2025     04/30/2025    BUN 12 04/30/2025    CREATININE 0.77 04/30/2025    EGFR 73.4 04/30/2025    BCR 15.6 04/30/2025    ANIONGAP 9.8 04/30/2025     Lab Results   Component Value Date    CRP 11.48 (H) 04/28/2025     Lab Results   Component Value Date    VANCOTROUGH 12.70 04/28/2025    VANCORANDOM 25.60 08/27/2024       Microbiology:  4/25 BCx negative so far    Prior radiology:  RLE Doppler negative for DVT    Assessment & Plan   #1 RLE cellulitis  #2 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  The minor fluctuation from 14-15 today does not appear to be of any significant clinical consequence.  Her blood cultures are negative to date.  Her right leg  is subjectively improved.    For now, I recommend that she continue empiric vancomycin 1250 mg IV every 24 hours with goal -600 and ceftriaxone 2 g IV every 24 hours.  I will determine an oral antibiotic regimen and duration closer to the time of discharge.  I will hold her chronic suppressive cephalexin for now given her broad-spectrum antibiotic use.  She expressed understanding when antibiotic education provided.    While the patient is on vancomycin, vancomycin levels will be ordered and reviewed with dose adjustments made as needed. The patient will have a daily creatinine level checked while on vancomycin as part of monitoring this medication.     ID will follow.  D/W Dr. Leroy.

## 2025-04-30 NOTE — THERAPY TREATMENT NOTE
Patient Name: Caitlyn Longoria  : 1934    MRN: [id number]                              Today's Date: 2025       Admit Date: 2025    Visit Dx:     ICD-10-CM ICD-9-CM   1. Sepsis without acute organ dysfunction, due to unspecified organism  A41.9 038.9     995.91   2. Cellulitis of right lower extremity  L03.115 682.6   3. Leukocytosis, unspecified type  D72.829 288.60   4. Hypokalemia  E87.6 276.8   5. Pedal edema  R60.0 782.3     Patient Active Problem List   Diagnosis    Neck and shoulder pain    Arthropathy of shoulder region    Carpal tunnel syndrome of left wrist    Chronic pain of both shoulders    Chronic left shoulder pain    Arthropathy of left shoulder    Dysarthria    Type 2 diabetes mellitus    Paroxysmal atrial fibrillation    HLD (hyperlipidemia)    Chronic bronchitis    Coronary artery disease    CVA (cerebral vascular accident)    HTN (hypertension)    CKD (chronic kidney disease), stage III    Chronic combined systolic (congestive) and diastolic (congestive) heart failure    Infection of prosthetic right knee joint    Stasis dermatitis of right lower extremity due to peripheral venous hypertension    Current use of long term anticoagulation    Morbid obesity    Acute respiratory failure with hypoxia    Rhinovirus    Asthma with acute exacerbation    Acute respiratory failure with hypoxemia    Viral pneumonia    Hypoxia    CLL (chronic lymphocytic leukemia)    Dyspnea    Anticoagulated on Coumadin    Osteoporotic compression fracture of spine    Pneumonia due to gram-negative bacteria    Pneumonia due to infectious organism    Bilateral carotid artery disease    Hypogammaglobulinemia    Hypogammaglobulinemia    Cellulitis of right lower extremity    Hypokalemia    Nocturnal hypoxia    COPD (chronic obstructive pulmonary disease)    Upper respiratory tract infection    COPD (chronic obstructive pulmonary disease)    Sick sinus syndrome    Anemia    Thrombocytopenia    Chronic HFrEF  (heart failure with reduced ejection fraction)    Cellulitis of left lower leg    Acute exacerbation of COPD with asthma    Leukocytosis    COPD exacerbation    Moderate protein-calorie malnutrition     Past Medical History:   Diagnosis Date    Aortic calcification     mild, on echo 12/17/2017    Aortic regurgitation     Trace    Asthma     Atrial fibrillation     CAD (coronary artery disease)     Carpal tunnel syndrome of left wrist     Chronic combined systolic and diastolic congestive heart failure     CKD (chronic kidney disease) stage 3, GFR 30-59 ml/min     COPD (chronic obstructive pulmonary disease)     Coronary artery disease involving native coronary artery of native heart with angina pectoris     Disc degeneration, lumbar     Diverticulosis     DM type 2 (diabetes mellitus, type 2)     Dyslipidemia     GERD (gastroesophageal reflux disease)     History of aneurysm     right femoral artery s/p LHC    History of blood transfusion     History of fracture     History of heart attack     History of vitamin D deficiency     Hyperlipidemia     Hypertension     Leukemia     Mild mitral regurgitation     Mitral annular calcification     12/8/2017- echo, moderate    Osteopenia     PAF (paroxysmal atrial fibrillation)     Peripheral neuropathy     Skin cancer     Left hand    Sleep apnea     bipap    SSS (sick sinus syndrome)     Stroke (cerebrum)     TIA (transient ischemic attack) 2017    Tricuspid regurgitation     Trace     Past Surgical History:   Procedure Laterality Date    BRONCHOSCOPY Bilateral 10/6/2020    Procedure: BRONCHOSCOPY with BILATERAL LUNG washings;  Surgeon: Juno Coburn MD;  Location: [facility] ENDOSCOPY;  Service: Pulmonary;  Laterality: Bilateral;  PRE: purulent bronchitis  POST: PURULENT BRONCHITIS    BRONCHOSCOPY Bilateral 10/9/2020    Procedure: BRONCHOSCOPY with washing;  Surgeon: Juno Coburn MD;  Location: [facility] ENDOSCOPY;  Service: Pulmonary;  Laterality: Bilateral;  pre/post -  mucous plug      CARDIAC CATHETERIZATION      CARDIAC ELECTROPHYSIOLOGY PROCEDURE N/A 2/7/2020    Procedure: PPM generator change - dual  medtronic;  Surgeon: Kiel Field MD;  Location: [facility] CATH INVASIVE LOCATION;  Service: Cardiology;  Laterality: N/A;    CHOLECYSTECTOMY      CORONARY STENT PLACEMENT      ENDOSCOPY N/A 9/14/2022    Procedure: ESOPHAGOGASTRODUODENOSCOPY with 54fr main dilatation;  Surgeon: Enio Cota MD;  Location: [facility] ENDOSCOPY;  Service: Gastroenterology;  Laterality: N/A;  pre - dysphagia  post - s/p dilatation, watermelon stomach    HERNIA REPAIR      hital hernia    HYSTERECTOMY      PACEMAKER IMPLANTATION      REPLACEMENT TOTAL KNEE Bilateral       General Information       Row Name 04/30/25 1551          Physical Therapy Time and Intention    Document Type therapy note (daily note)  -AR     Mode of Treatment physical therapy  -AR       Row Name 04/30/25 1551          General Information    Patient Profile Reviewed yes  -AR     Existing Precautions/Restrictions fall  -AR       Row Name 04/30/25 1551          Cognition    Orientation Status (Cognition) oriented x 3  -AR       Row Name 04/30/25 1551          Safety Issues/Impairments Affecting Functional Mobility    Safety Issues Affecting Function (Mobility) problem-solving;insight into deficits/self-awareness;awareness of need for assistance;ability to follow commands  -AR     Impairments Affecting Function (Mobility) balance;endurance/activity tolerance;strength;range of motion (ROM);cognition;coordination;pain;shortness of breath;motor control;postural/trunk control  -AR               User Key  (r) = Recorded By, (t) = Taken By, (c) = Cosigned By      Initials Name Provider Type    AR Leonor Shabazz PT Physical Therapist                   Mobility       Row Name 04/30/25 9761          Bed Mobility    Supine-Sit [place] (Bed Mobility) standby assist  -AR     Assistive Device (Bed Mobility) bed rails;head of bed  elevated  -AR     Comment, (Bed Mobility) increased time  -AR       Row Name 04/30/25 1551          Sit-Stand Transfer    Sit-Stand [place] (Transfers) minimum assist (75% patient effort);moderate assist (50% patient effort)  -AR     Assistive Device (Sit-Stand Transfers) walker, front-wheeled  -AR     Comment, (Sit-Stand Transfer) min A first stand from bed, then declined to min-mod A x2 by the 3rd stand due to increased posterior lean, feeling like PT pushing her forward, began yelling out/pushing back harder etc.  -AR       Row Name 04/30/25 1551          Gait/Stairs (Locomotion)    [place] Level (Gait) minimum assist (75% patient effort)  -AR     Assistive Device (Gait) walker, front-wheeled  -AR     Patient was able to Ambulate yes  -AR     Distance in Feet (Gait) 3  bed>chair>BSC>chair 1+1+1 with sitting rest breaks  -AR     Deviations/Abnormal Patterns (Gait) jeremi decreased;festinating/shuffling;gait speed decreased  -AR     Bilateral Gait Deviations forward flexed posture  -AR               User Key  (r) = Recorded By, (t) = Taken By, (c) = Cosigned By      Initials Name Provider Type    Leonor Markham, CHERRY Physical Therapist                   Obj/Interventions       Row Name 04/30/25 1553          Balance    Static Sitting Balance standby assist  -AR     Static Standing Balance minimal assist;moderate assist  -AR     Dynamic Standing Balance minimal assist;moderate assist  -AR     Position/Device Used, Standing Balance walker, rolling  -AR               User Key  (r) = Recorded By, (t) = Taken By, (c) = Cosigned By      Initials Name Provider Type    Leonor Markham, CHERRY Physical Therapist                   Goals/Plan    No documentation.                  Clinical Impression       Row Name 04/30/25 1553          Pain    Pretreatment Pain Rating 3/10  -AR     Posttreatment Pain Rating 3/10  -AR     Pain Location extremity  -AR     Pain Side/Orientation right;lower  -AR     Response to  Pain Interventions activity participation with tolerable pain  -AR       Row Name 04/30/25 7347          Plan of Care Review    Plan of Care Reviewed With patient  -AR     Outcome Evaluation Continued weakness with impaired balance during PT today.  Able to stand and transfer bed>chair w/ min A using RW the  first stand.  For toileting needs, pt stood and transferred 2 more times, w/ declined independence each time due to increase posterior lean.  Required min-mod A x2 with walker and frequent cues to transfer off BSC to recliner.   Discussed pt assist level w/ RN and aid.  Recommend Dc to SNU.  -AR       Row Name 04/30/25 2098          Therapy Assessment/Plan (PT)    Rehab Potential (PT) good  -AR     Criteria for Skilled Interventions Met (PT) yes  -AR     Therapy Frequency (PT) 5 times/wk  -AR       Row Name 04/30/25 0578          Vital Signs    O2 Delivery Pre Treatment room air  -AR       Row Name 04/30/25 1556          Positioning and Restraints    Pre-Treatment Position in bed  -AR     Post Treatment Position chair  -AR     In Chair notified nsg;reclined;sitting;call light within reach;encouraged to call for assist;exit alarm on;with nsg  -AR               User Key  (r) = Recorded By, (t) = Taken By, (c) = Cosigned By      Initials Name Provider Type    Leonor Markham PT Physical Therapist                   Outcome Measures       Row Name 04/30/25 4009          How much help from another person do you currently need...    Turning from your back to your side while in flat bed without using bedrails? 4  -AR     Moving from lying on back to sitting on the side of a flat bed without bedrails? 3  -AR     Moving to and from a bed to a chair (including a wheelchair)? 2  -AR     Standing up from a chair using your arms (e.g., wheelchair, bedside chair)? 3  -AR     Climbing 3-5 steps with a railing? 1  -AR     To walk in hospital room? 2  -AR     AM-PAC 6 Clicks Score (PT) 15  -AR     Highest Level of Mobility  Goal 4 --> Transfer to chair/commode  -AR       Row Name 04/30/25 1557          Functional Assessment    Outcome Measure Options AM-PAC 6 Clicks Basic Mobility (PT)  -AR               User Key  (r) = Recorded By, (t) = Taken By, (c) = Cosigned By      Initials Name Provider Type    AR Leonor Shabazz, CHERRY Physical Therapist                                 Physical Therapy Education       Title: PT OT SLP Therapies (In Progress)       Topic: Physical Therapy (In Progress)       Point: Mobility training (In Progress)       Learning Progress Summary            Patient Acceptance, E, NR by AR at 4/30/2025 1558    Acceptance, E, VU,NR by  at 4/28/2025 1317    Acceptance, E, VU,NR by  at 4/26/2025 0956                      Point: Home exercise program (In Progress)       Learning Progress Summary            Patient Acceptance, E, NR by AR at 4/30/2025 1558    Acceptance, E, VU,NR by  at 4/28/2025 1317    Acceptance, E, VU,NR by  at 4/26/2025 0956                      Point: Body mechanics (In Progress)       Learning Progress Summary            Patient Acceptance, E, NR by AR at 4/30/2025 1558    Acceptance, E, VU,NR by  at 4/28/2025 1317    Acceptance, E, VU,NR by  at 4/26/2025 0956                      Point: Precautions (In Progress)       Learning Progress Summary            Patient Acceptance, E, NR by AR at 4/30/2025 1558    Acceptance, E, VU,NR by  at 4/28/2025 1317    Acceptance, E, VU,NR by  at 4/26/2025 0956                                      User Key       Initials Effective Dates Name Provider Type Discipline    AR 06/16/21 -  Leonor Shabazz, PT Physical Therapist PT     05/02/22 -  Evelyn Murillo PT Physical Therapist PT                  PT Recommendation and Plan     Outcome Evaluation: Continued weakness with impaired balance during PT today.  Able to stand and transfer bed>chair w/ min A using RW the  first stand.  For toileting needs, pt stood and transferred 2 more times, w/  declined independence each time due to increase posterior lean.  Required min-mod A x2 with walker and frequent cues to transfer off BSC to recliner.   Discussed pt assist level w/ RN and aid.  Recommend Dc to SNU.     Time Calculation:         PT Charges       Row Name 04/30/25 1550             Time Calculation    Start Time 1455  -AR      Stop Time 1520  -AR      Time Calculation (min) 25 min  -AR      PT Received On 04/30/25  -AR      PT - Next Appointment 05/01/25  -AR                User Key  (r) = Recorded By, (t) = Taken By, (c) = Cosigned By      Initials Name Provider Type    AR Leonor Shabazz, PT Physical Therapist                  Therapy Charges for Today       Code Description Service Date Service Provider Modifiers Qty    19617495706 HC PT THER PROC EA 15 MIN 4/30/2025 Leonor Shabazz, PT GP 1    57280199171 HC PT THERAPEUTIC ACT EA 15 MIN 4/30/2025 Leonor Shabazz PT GP 1            PT G-Codes  Outcome Measure Options: AM-PAC 6 Clicks Basic Mobility (PT)  AM-PAC 6 Clicks Score (PT): 15  AM-PAC 6 Clicks Score (OT): 13  PT Discharge Summary  Anticipated Discharge Disposition (PT): skilled nursing facility    Leonor Shabazz PT  4/30/2025

## 2025-05-01 ENCOUNTER — APPOINTMENT (OUTPATIENT)
Dept: GENERAL RADIOLOGY | Facility: HOSPITAL | Age: OVER 89
DRG: 638 | End: 2025-05-01
Payer: MEDICARE

## 2025-05-01 LAB
ANION GAP SERPL CALCULATED.3IONS-SCNC: 11 MMOL/L (ref 5–15)
B PARAPERT DNA SPEC QL NAA+PROBE: NOT DETECTED
B PERT DNA SPEC QL NAA+PROBE: NOT DETECTED
BUN SERPL-MCNC: 10 MG/DL (ref 8–23)
BUN/CREAT SERPL: 14.5 (ref 7–25)
C PNEUM DNA NPH QL NAA+NON-PROBE: NOT DETECTED
CALCIUM SPEC-SCNC: 8.2 MG/DL (ref 8.2–9.6)
CHLORIDE SERPL-SCNC: 104 MMOL/L (ref 98–107)
CO2 SERPL-SCNC: 23 MMOL/L (ref 22–29)
CREAT SERPL-MCNC: 0.69 MG/DL (ref 0.57–1)
CRP SERPL-MCNC: 8.43 MG/DL (ref 0–0.5)
DEPRECATED RDW RBC AUTO: 46.8 FL (ref 37–54)
EGFRCR SERPLBLD CKD-EPI 2021: 82.6 ML/MIN/1.73
ERYTHROCYTE [DISTWIDTH] IN BLOOD BY AUTOMATED COUNT: 13.1 % (ref 12.3–15.4)
FLUAV SUBTYP SPEC NAA+PROBE: NOT DETECTED
FLUBV RNA ISLT QL NAA+PROBE: NOT DETECTED
GLUCOSE SERPL-MCNC: 164 MG/DL (ref 65–99)
HADV DNA SPEC NAA+PROBE: NOT DETECTED
HCOV 229E RNA SPEC QL NAA+PROBE: NOT DETECTED
HCOV HKU1 RNA SPEC QL NAA+PROBE: NOT DETECTED
HCOV NL63 RNA SPEC QL NAA+PROBE: NOT DETECTED
HCOV OC43 RNA SPEC QL NAA+PROBE: NOT DETECTED
HCT VFR BLD AUTO: 32.8 % (ref 34–46.6)
HGB BLD-MCNC: 10.2 G/DL (ref 12–15.9)
HMPV RNA NPH QL NAA+NON-PROBE: NOT DETECTED
HPIV1 RNA ISLT QL NAA+PROBE: NOT DETECTED
HPIV2 RNA SPEC QL NAA+PROBE: NOT DETECTED
HPIV3 RNA NPH QL NAA+PROBE: NOT DETECTED
HPIV4 P GENE NPH QL NAA+PROBE: NOT DETECTED
INR PPP: 2.41 (ref 0.9–1.1)
LYMPHOCYTES # BLD MANUAL: 8.44 10*3/MM3 (ref 0.7–3.1)
LYMPHOCYTES NFR BLD MANUAL: 1.4 % (ref 5–12)
M PNEUMO IGG SER IA-ACNC: NOT DETECTED
MCH RBC QN AUTO: 30.4 PG (ref 26.6–33)
MCHC RBC AUTO-ENTMCNC: 31.1 G/DL (ref 31.5–35.7)
MCV RBC AUTO: 97.9 FL (ref 79–97)
METAMYELOCYTES NFR BLD MANUAL: 1.4 % (ref 0–0)
MONOCYTES # BLD: 0.21 10*3/MM3 (ref 0.1–0.9)
NEUTROPHILS # BLD AUTO: 6.33 10*3/MM3 (ref 1.7–7)
NEUTROPHILS NFR BLD MANUAL: 41.7 % (ref 42.7–76)
PLAT MORPH BLD: NORMAL
PLATELET # BLD AUTO: 119 10*3/MM3 (ref 140–450)
PMV BLD AUTO: 10.6 FL (ref 6–12)
POTASSIUM SERPL-SCNC: 3.9 MMOL/L (ref 3.5–5.2)
PROTHROMBIN TIME: 26.5 SECONDS (ref 11.7–14.2)
RBC # BLD AUTO: 3.35 10*6/MM3 (ref 3.77–5.28)
RBC MORPH BLD: NORMAL
RHINOVIRUS RNA SPEC NAA+PROBE: NOT DETECTED
RSV RNA NPH QL NAA+NON-PROBE: NOT DETECTED
SARS-COV-2 RNA NPH QL NAA+NON-PROBE: NOT DETECTED
SMUDGE CELLS BLD QL SMEAR: ABNORMAL
SODIUM SERPL-SCNC: 138 MMOL/L (ref 136–145)
VARIANT LYMPHS NFR BLD MANUAL: 55.6 % (ref 19.6–45.3)
WBC NRBC COR # BLD AUTO: 15.18 10*3/MM3 (ref 3.4–10.8)

## 2025-05-01 PROCEDURE — 94799 UNLISTED PULMONARY SVC/PX: CPT

## 2025-05-01 PROCEDURE — 25010000002 VANCOMYCIN HCL 1.25 G RECONSTITUTED SOLUTION 1 EACH VIAL: Performed by: INTERNAL MEDICINE

## 2025-05-01 PROCEDURE — 86140 C-REACTIVE PROTEIN: CPT | Performed by: INTERNAL MEDICINE

## 2025-05-01 PROCEDURE — 71045 X-RAY EXAM CHEST 1 VIEW: CPT

## 2025-05-01 PROCEDURE — 85007 BL SMEAR W/DIFF WBC COUNT: CPT | Performed by: INTERNAL MEDICINE

## 2025-05-01 PROCEDURE — 94664 DEMO&/EVAL PT USE INHALER: CPT

## 2025-05-01 PROCEDURE — 99233 SBSQ HOSP IP/OBS HIGH 50: CPT | Performed by: INTERNAL MEDICINE

## 2025-05-01 PROCEDURE — 92610 EVALUATE SWALLOWING FUNCTION: CPT

## 2025-05-01 PROCEDURE — 0202U NFCT DS 22 TRGT SARS-COV-2: CPT | Performed by: HOSPITALIST

## 2025-05-01 PROCEDURE — 25010000002 METHYLPREDNISOLONE PER 40 MG: Performed by: INTERNAL MEDICINE

## 2025-05-01 PROCEDURE — 85610 PROTHROMBIN TIME: CPT | Performed by: STUDENT IN AN ORGANIZED HEALTH CARE EDUCATION/TRAINING PROGRAM

## 2025-05-01 PROCEDURE — 25010000002 CEFTRIAXONE PER 250 MG: Performed by: INTERNAL MEDICINE

## 2025-05-01 PROCEDURE — 25810000003 SODIUM CHLORIDE 0.9 % SOLUTION 250 ML FLEX CONT: Performed by: INTERNAL MEDICINE

## 2025-05-01 PROCEDURE — 80048 BASIC METABOLIC PNL TOTAL CA: CPT | Performed by: STUDENT IN AN ORGANIZED HEALTH CARE EDUCATION/TRAINING PROGRAM

## 2025-05-01 PROCEDURE — G0545 PR INHERENT VISIT TO INPT: HCPCS | Performed by: INTERNAL MEDICINE

## 2025-05-01 PROCEDURE — 94761 N-INVAS EAR/PLS OXIMETRY MLT: CPT

## 2025-05-01 PROCEDURE — 85025 COMPLETE CBC W/AUTO DIFF WBC: CPT | Performed by: INTERNAL MEDICINE

## 2025-05-01 RX ORDER — DOXYCYCLINE 100 MG/1
100 CAPSULE ORAL EVERY 12 HOURS SCHEDULED
Status: DISCONTINUED | OUTPATIENT
Start: 2025-05-02 | End: 2025-05-05 | Stop reason: HOSPADM

## 2025-05-01 RX ORDER — PREDNISONE 20 MG/1
40 TABLET ORAL
Status: DISCONTINUED | OUTPATIENT
Start: 2025-05-02 | End: 2025-05-05 | Stop reason: HOSPADM

## 2025-05-01 RX ORDER — METHYLPREDNISOLONE SODIUM SUCCINATE 40 MG/ML
40 INJECTION, POWDER, LYOPHILIZED, FOR SOLUTION INTRAMUSCULAR; INTRAVENOUS EVERY 12 HOURS
Status: COMPLETED | OUTPATIENT
Start: 2025-05-01 | End: 2025-05-01

## 2025-05-01 RX ADMIN — ANTI-FUNGAL POWDER MICONAZOLE NITRATE TALC FREE 1 APPLICATION: 1.42 POWDER TOPICAL at 09:00

## 2025-05-01 RX ADMIN — Medication 4 ML: at 20:52

## 2025-05-01 RX ADMIN — METHYLPREDNISOLONE SODIUM SUCCINATE 40 MG: 40 INJECTION, POWDER, FOR SOLUTION INTRAMUSCULAR; INTRAVENOUS at 22:44

## 2025-05-01 RX ADMIN — CETIRIZINE HYDROCHLORIDE 10 MG: 10 TABLET ORAL at 08:51

## 2025-05-01 RX ADMIN — CALCIUM CARBONATE-VITAMIN D TAB 500 MG-200 UNIT 1 TABLET: 500-200 TAB at 08:51

## 2025-05-01 RX ADMIN — VANCOMYCIN HYDROCHLORIDE 1250 MG: 1.25 INJECTION, POWDER, LYOPHILIZED, FOR SOLUTION INTRAVENOUS at 16:18

## 2025-05-01 RX ADMIN — Medication 4 ML: at 07:03

## 2025-05-01 RX ADMIN — WARFARIN 2 MG: 2 TABLET ORAL at 17:29

## 2025-05-01 RX ADMIN — Medication 1 APPLICATION: at 20:20

## 2025-05-01 RX ADMIN — CALCIUM CARBONATE-VITAMIN D TAB 500 MG-200 UNIT 1 TABLET: 500-200 TAB at 20:18

## 2025-05-01 RX ADMIN — SODIUM CHLORIDE 2000 MG: 9 INJECTION, SOLUTION INTRAVENOUS at 17:28

## 2025-05-01 RX ADMIN — IPRATROPIUM BROMIDE AND ALBUTEROL SULFATE 3 ML: .5; 3 SOLUTION RESPIRATORY (INHALATION) at 13:24

## 2025-05-01 RX ADMIN — OXYBUTYNIN CHLORIDE 10 MG: 10 TABLET, EXTENDED RELEASE ORAL at 20:17

## 2025-05-01 RX ADMIN — MONTELUKAST 10 MG: 10 TABLET, FILM COATED ORAL at 20:19

## 2025-05-01 RX ADMIN — IPRATROPIUM BROMIDE AND ALBUTEROL SULFATE 3 ML: .5; 3 SOLUTION RESPIRATORY (INHALATION) at 07:02

## 2025-05-01 RX ADMIN — OXYBUTYNIN CHLORIDE 10 MG: 10 TABLET, EXTENDED RELEASE ORAL at 08:51

## 2025-05-01 RX ADMIN — ANTI-FUNGAL POWDER MICONAZOLE NITRATE TALC FREE 1 APPLICATION: 1.42 POWDER TOPICAL at 20:20

## 2025-05-01 RX ADMIN — BUDESONIDE 0.5 MG: 0.5 INHALANT RESPIRATORY (INHALATION) at 20:47

## 2025-05-01 RX ADMIN — GUAIFENESIN 1200 MG: 600 TABLET, EXTENDED RELEASE ORAL at 20:18

## 2025-05-01 RX ADMIN — CETIRIZINE HYDROCHLORIDE 10 MG: 10 TABLET ORAL at 20:19

## 2025-05-01 RX ADMIN — Medication 1 APPLICATION: at 08:51

## 2025-05-01 RX ADMIN — Medication 1000 UNITS: at 08:51

## 2025-05-01 RX ADMIN — BUDESONIDE 0.5 MG: 0.5 INHALANT RESPIRATORY (INHALATION) at 07:04

## 2025-05-01 RX ADMIN — GUAIFENESIN 1200 MG: 600 TABLET, EXTENDED RELEASE ORAL at 08:51

## 2025-05-01 RX ADMIN — IPRATROPIUM BROMIDE AND ALBUTEROL SULFATE 3 ML: .5; 3 SOLUTION RESPIRATORY (INHALATION) at 20:44

## 2025-05-01 RX ADMIN — FLUTICASONE PROPIONATE 1 SPRAY: 50 SPRAY, METERED NASAL at 08:51

## 2025-05-01 RX ADMIN — METHYLPREDNISOLONE SODIUM SUCCINATE 40 MG: 40 INJECTION, POWDER, FOR SOLUTION INTRAMUSCULAR; INTRAVENOUS at 13:15

## 2025-05-01 RX ADMIN — ROSUVASTATIN CALCIUM 20 MG: 10 TABLET, FILM COATED ORAL at 20:19

## 2025-05-01 NOTE — PROGRESS NOTES
LOS: 6 days     Chief Complaint: Right lower extremity cellulitis    Interval History: No acute events.  No fever.  She reports a dry cough.  She says her leg is feeling better.  No side effects from vancomycin or ceftriaxone.  She is hoping for discharge soon.  Medications:    Current Facility-Administered Medications:     acetaminophen (TYLENOL) tablet 650 mg, 650 mg, Oral, Q4H PRN **OR** acetaminophen (TYLENOL) 160 MG/5ML oral solution 650 mg, 650 mg, Oral, Q4H PRN **OR** acetaminophen (TYLENOL) suppository 650 mg, 650 mg, Rectal, Q4H PRN, Jojo Garcia MD    albuterol (PROVENTIL) nebulizer solution 0.083% 2.5 mg/3mL, 2.5 mg, Nebulization, Q6H PRN, Jojo Garcia MD, 2.5 mg at 04/27/25 1545    sennosides-docusate (PERICOLACE) 8.6-50 MG per tablet 2 tablet, 2 tablet, Oral, BID PRN **AND** polyethylene glycol (MIRALAX) packet 17 g, 17 g, Oral, Daily PRN **AND** bisacodyl (DULCOLAX) EC tablet 5 mg, 5 mg, Oral, Daily PRN **AND** bisacodyl (DULCOLAX) suppository 10 mg, 10 mg, Rectal, Daily PRN, Jojo Garcia MD    budesonide (PULMICORT) nebulizer solution 0.5 mg, 0.5 mg, Nebulization, BID - RT, Jojo Garcia MD, 0.5 mg at 05/01/25 0704    calcium 500 mg vitamin D 5 mcg (200 UT) per tablet 1 tablet, 1 tablet, Oral, BID, Jojo Garcia MD, 1 tablet at 05/01/25 0851    Calcium Replacement - Follow Nurse / BPA Driven Protocol, , Not Applicable, PRN, Jojo Garcia MD    cefTRIAXone (ROCEPHIN) 2,000 mg in sodium chloride 0.9 % 100 mL MBP, 2,000 mg, Intravenous, Q24H, Lora Leroy MD, Last Rate: 200 mL/hr at 04/30/25 1747, 2,000 mg at 04/30/25 1747    cetirizine (zyrTEC) tablet 10 mg, 10 mg, Oral, BID, Lora Leroy MD, 10 mg at 05/01/25 0851    cholecalciferol (VITAMIN D3) tablet 1,000 Units, 1,000 Units, Oral, Daily, Stingl, Jojo Landaverde MD, 1,000 Units at 05/01/25 0851    fluticasone (FLONASE) 50 MCG/ACT nasal spray 1 spray, 1 spray, Nasal, Daily,  Lora Leroy MD, 1 spray at 05/01/25 0851    guaiFENesin (MUCINEX) 12 hr tablet 1,200 mg, 1,200 mg, Oral, Q12H, Lora Leroy MD, 1,200 mg at 05/01/25 0851    ipratropium-albuterol (DUO-NEB) nebulizer solution 3 mL, 3 mL, Nebulization, Q6H While Awake - RT, Jojo Garcia MD, 3 mL at 05/01/25 0702    Magnesium Standard Dose Replacement - Follow Nurse / BPA Driven Protocol, , Not Applicable, PRN, Jojo Garcia MD    melatonin tablet 2.5 mg, 2.5 mg, Oral, Nightly PRN, Jojo Garcia MD    Menthol-Zinc Oxide 1 Application, 1 Application, Topical, Q12H, Lora Leroy MD, 1 Application at 05/01/25 0851    methylPREDNISolone sodium succinate (SOLU-Medrol) injection 40 mg, 40 mg, Intravenous, Q12H, Santiago Romero MD    miconazole (MICOTIN) 2 % powder 1 Application, 1 Application, Topical, Q12H, Jojo Garcia MD, 1 Application at 04/30/25 2201    montelukast (SINGULAIR) tablet 10 mg, 10 mg, Oral, Nightly, Jojo Garcia MD, 10 mg at 04/30/25 2201    ondansetron ODT (ZOFRAN-ODT) disintegrating tablet 4 mg, 4 mg, Oral, Q6H PRN **OR** ondansetron (ZOFRAN) injection 4 mg, 4 mg, Intravenous, Q6H PRN, Jojo Garcia MD    oxybutynin XL (DITROPAN-XL) 24 hr tablet 10 mg, 10 mg, Oral, BID, Jojo Garcia MD, 10 mg at 05/01/25 0851    Pharmacy to dose vancomycin, , Not Applicable, Continuous PRN, Mehul Miller MD    Pharmacy to dose warfarin, , Not Applicable, Continuous PRRadha CARDENAS Renate Andrea, MD    Phosphorus Replacement - Follow Nurse / BPA Driven Protocol, , Not Applicable, PRNRadha Renate Andrea, MD    Potassium Replacement - Follow Nurse / BPA Driven Protocol, , Not Applicable, Radha YEE Renate Andrea, MD    [START ON 5/2/2025] predniSONE (DELTASONE) tablet 40 mg, 40 mg, Oral, Daily With Breakfast, Santiago Romero MD    rosuvastatin (CRESTOR) tablet 20 mg, 20 mg, Oral, Nightly, Jojo Garcia MD, 20 mg at  04/30/25 2201    [COMPLETED] Insert Peripheral IV, , , Once **AND** sodium chloride 0.9 % flush 10 mL, 10 mL, Intravenous, PRN, Noah Saunders MD    sodium chloride 7 % nebulizer solution nebulizer solution 4 mL, 4 mL, Nebulization, BID - RT, Jojo Garcia MD, 4 mL at 05/01/25 0703    Vancomycin HCl 1,250 mg in sodium chloride 0.9 % 250 mL VTB, 1,250 mg, Intravenous, Q24H, Mehul Miller MD, Last Rate: 200 mL/hr at 04/30/25 1537, 1,250 mg at 04/30/25 1537    warfarin (COUMADIN) tablet 2 mg, 2 mg, Oral, Daily, Jojo Garcia MD, 2 mg at 04/30/25 1747      Vital Signs  Temp:  [97.5 °F (36.4 °C)-98.4 °F (36.9 °C)] 97.5 °F (36.4 °C)  Heart Rate:  [80-81] 80  Resp:  [18-20] 18  BP: ()/(53-65) 114/56    Physical Exam:  General: NAD, very nice, sitting up in bed  Cardiovascular: Normal rate  Respiratory: Breathing comfortably on ambient air; no cough during my visit  GI: Not distended or tender; soft  : No Mancilla catheter  Skin: RLE wrapped; no erythema outside of the dressing; LLE with chronic venous stasis changes     Results Review:    CBC, BMP, CRP, and blood cultures reviewed today  Lab Results   Component Value Date    WBC 15.18 (H) 05/01/2025    HGB 10.2 (L) 05/01/2025    HCT 32.8 (L) 05/01/2025    MCV 97.9 (H) 05/01/2025     (L) 05/01/2025     Lab Results   Component Value Date    GLUCOSE 164 (H) 05/01/2025    CALCIUM 8.2 05/01/2025     05/01/2025    K 3.9 05/01/2025    CO2 23.0 05/01/2025     05/01/2025    BUN 10 05/01/2025    CREATININE 0.69 05/01/2025    EGFR 82.6 05/01/2025    BCR 14.5 05/01/2025    ANIONGAP 11.0 05/01/2025     Lab Results   Component Value Date    CRP 8.43 (H) 05/01/2025     Lab Results   Component Value Date    [facility] 04/28/20[facility]    VANCORANDOM 25.60 08/27/2024       Microbiology:  4/25 BCx negative, final    Prior radiology:  RLE Doppler negative for DVT    Assessment & Plan   #1 RLE cellulitis  #2 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  Her CRP is trending down.  WBC improved compared to admission.  Blood cultures are negative to date.  Appearance of the right leg and her subjective symptoms are much better.    I will have her receive her dose of vancomycin today and then tomorrow she will start doxycycline 100 mg p.o. twice daily.    While in the hospital, continue ceftriaxone 2 g IV every 24 hours but at discharge this can be changed to cefpodoxime 400 mg p.o. every 12 hours.    Stop date for doxycycline and cefpodoxime will be 5/9/2025 which will complete a 14-day total course.    I told her to hold her chronic suppressive cephalexin 500 mg p.o. every 8 hours (for suppression of right knee PJI) for now given her broad-spectrum antibiotic use.  She will resume this chronic medication on 5/10/2025.  She expressed understanding when antibiotic education provided.    Communicated antibiotic plan w/ Dr Khan.     I will check on her again tomorrow to ensure antibiotic tolerability but no objection to DC from my standpoint when arrangement have been made.

## 2025-05-01 NOTE — CASE MANAGEMENT/SOCIAL WORK
Continued Stay Note  [facility]     Patient Name: Caitlyn Longoria  MRN: [id number]  Today's Date: 5/1/2025    Admit Date: 4/25/2025    Plan: [facility]. Per ID, plan to start PO antibiotic tomorrow. Plan for VFSS tomorrow   Discharge Plan       Row Name 05/01/25 1613       Plan    Plan [facility]. Per ID, plan to start PO antibiotic tomorrow. Plan for VFSS tomorrow    Patient/Family in Agreement with Plan yes    Plan Comments ST bedside eval completed and recommend only free H2O until VFSS completed tomorrow. Updated Latonya/Liz on pt's progress for D/C. She will continue to follow. Deepak Peraza RN-BC                  Discharge Codes    No documentation.                 Expected Discharge Date and Time       Expected Discharge Date Expected Discharge Time    May 3, 2025               Deepak Peraza RN

## 2025-05-01 NOTE — PROGRESS NOTES
[facility] INPATIENT PROGRESS NOTE         [address]    2025      PATIENT IDENTIFICATION:  Name: Caitlyn Longoria ADMIT: 2025   : 1934  PCP: Zenaida Suarez MD    MRN: [id number] LOS: 6 days   AGE/SEX: 89 y.o. female  ROOM: [facility]                     LOS 6    Reason for visit: CHARLENE      SUBJECTIVE:      Noted increased cough with thick sputum starting yesterday.  Is not any better this morning she says.  Coarse breath sounds with wheezing on auscultation bilaterally.      Objective   OBJECTIVE:    Vital Sign Min/Max for last 24 hours  Temp  Min: 97.5 °F (36.4 °C)  Max: 98.4 °F (36.9 °C)   BP  Min: 98/60  Max: 116/65   Pulse  Min: 80  Max: 81   Resp  Min: 18  Max: 20   SpO2  Min: 96 %  Max: 100 %   No data recorded   Weight  Min: 82.1 kg (180 lb 14.4 oz)  Max: 82.1 kg (180 lb 14.4 oz)    Vitals:    25 0703 25 0704 25 0711 25 0842   BP:    114/56   BP Location:    Right arm   Patient Position:    Lying   Pulse: 80 80 81 80   Resp:   18 18   Temp:    97.5 °F (36.4 °C)   TempSrc:    Oral   SpO2: 100% 100%  100%   Weight:       Height:                25  0600 25  0617 25  0500   Weight: 80.6 kg (177 lb 11.1 oz) 79.5 kg (175 lb 3.2 oz) 82.1 kg (180 lb 14.4 oz)       Body mass index is 32.05 kg/m².                          Body mass index is 32.05 kg/m².    Intake/Output Summary (Last 24 hours) at 2025 0915  Last data filed at 2025 0000  Gross per 24 hour   Intake 600 ml   Output 900 ml   Net -300 ml         Exam:  GEN:  No distress, appears stated age  EYES:   PERRL, anicteric sclerae  ENT:    External ears/nose normal, OP clear  NECK:  No adenopathy, midline trachea  LUNGS: Normal chest on inspection, palpation and coarse wheezing noted on auscultation  CV:  Normal S1S2, without murmur  ABD:  Nontender, nondistended, no hepatosplenomegaly, +BS  EXT:  No edema.  No cyanosis or clubbing.  No mottling and normal cap  refill.    Assessment     Scheduled meds:  budesonide, 0.5 mg, Nebulization, BID - RT  calcium 500 mg vitamin D 5 mcg (200 UT), 1 tablet, Oral, BID  cefTRIAXone, 2,000 mg, Intravenous, Q24H  cetirizine, 10 mg, Oral, BID  cholecalciferol, 1,000 Units, Oral, Daily  fluticasone, 1 spray, Nasal, Daily  guaiFENesin, 1,200 mg, Oral, Q12H  ipratropium-albuterol, 3 mL, Nebulization, Q6H While Awake - RT  Menthol-Zinc Oxide, 1 Application, Topical, Q12H  miconazole, 1 Application, Topical, Q12H  montelukast, 10 mg, Oral, Nightly  oxybutynin XL, 10 mg, Oral, BID  rosuvastatin, 20 mg, Oral, Nightly  sodium chloride, 4 mL, Nebulization, BID - RT  vancomycin, 1,250 mg, Intravenous, Q24H  warfarin, 2 mg, Oral, Daily      IV meds:                      Pharmacy to dose vancomycin,   Pharmacy to dose warfarin,       Data Review:  Results from last 7 days   Lab Units 05/01/25 0526 04/30/25  1522 04/30/25 0349 04/29/25 0259 04/28/25 0438 04/27/25  0336   SODIUM mmol/L 138  --  139 136 141 141   POTASSIUM mmol/L 3.9 4.4 3.6 3.8 4.1 4.2   CHLORIDE mmol/L 104  --  104 104 109* 111*   CO2 mmol/L 23.0  --  25.2 22.8 23.0 22.5   BUN mg/dL 10  --  12 11 11 14   CREATININE mg/dL 0.69  --  0.77 0.73 0.68 0.71   GLUCOSE mg/dL 164*  --  169* 144* 125* 102*   CALCIUM mg/dL 8.2  --  8.7 8.4 8.2 7.9*         Estimated Creatinine Clearance: 55 mL/min (by C-G formula based on SCr of 0.69 mg/dL).  Results from last 7 days   Lab Units 05/01/25 0526 04/30/25  0349 04/29/25  0259 04/28/25  0438 04/27/25  0336   WBC 10*3/mm3 15.18* 15.54* 14.35* 16.11* 17.66*   HEMOGLOBIN g/dL 10.2* 10.5* 10.3* 10.6* 10.4*   PLATELETS 10*3/mm3 119* 113* 108* 108* 97*     Results from last 7 days   Lab Units 05/01/25  0525 04/30/25  0350 04/29/25  0259 04/28/25  0438 04/27/25  1224   INR  2.41* 2.51* 2.18* 2.06* 1.89*     Results from last 7 days   Lab Units 04/27/25  0336 04/25/25  1237   ALT (SGPT) U/L 56* 45*   AST (SGOT) U/L 89* 23         Results from last 7 days  "  Lab Units 04/25/25  1550 04/25/25  1237   PROCALCITONIN ng/mL  --  0.27*   LACTATE mmol/L 1.2 2.1*         No results found for: \"HGBA1C\", \"POCGLU\"        Imaging reviewed  Chest x-ray 4/25 reviewed: No focal consolidation.  Cardiomegaly noted.    Lower extremity Doppler 4/26 reviewed: Normal right lower extremity    Microbiology reviewed  NGTD          Active Hospital Problems    Diagnosis  POA    **Cellulitis of right lower extremity [L03.115]  Yes    Moderate protein-calorie malnutrition [E44.0]  Yes    Thrombocytopenia [D69.6]  Yes    Sick sinus syndrome [I49.5]  Yes    Chronic HFrEF (heart failure with reduced ejection fraction) [I50.22]  Yes    COPD (chronic obstructive pulmonary disease) [J44.9]  Yes    Bilateral carotid artery disease [I77.9]  Yes    Anticoagulated on Coumadin [Z79.01]  Not Applicable    CLL (chronic lymphocytic leukemia) [C91.10]  Yes    HTN (hypertension) [I10]  Yes    CKD (chronic kidney disease), stage III [N18.30]  Yes    Paroxysmal atrial fibrillation [I48.0]  Yes    Type 2 diabetes mellitus [E11.9]  Yes    HLD (hyperlipidemia) [E78.5]  Yes    Coronary artery disease [I25.10]  Yes      Resolved Hospital Problems   No resolved problems to display.         ASSESSMENT:  CHARLENE  Severe persistent asthma with acute exacerbation  Hypogammaglobulinemia  Cellulitis right lower extremity      PLAN:  Encourage positive airway pressure with all sleep.  On BiPAP 14/11 with 2 L of oxygen bled in at home.  Bronchodilators as needed for asthma symptoms.  With worsening asthma symptoms and exacerbation now will add steroid.    Antibiotics for cellulitis per admitting service.      Santiago Romero MD  Pulmonary and Critical Care Medicine  [facility], [facility]  5/1/2025    09:15 EDT     "

## 2025-05-01 NOTE — PLAN OF CARE
Goal Outcome Evaluation:  Plan of Care Reviewed With: patient           Outcome Evaluation: Pt seen for clinical swallow assessment. Oral mech exam appeared WFL for feeding/swallowing. Overt s/s noted on water trial via spoon with rapid breath and wheezing, as well as puree with cough. Pt reported globus sensation after swallowing solids and liquids prior to hospital stay. SLP recommends Free Water Protocol until pt receives VFSS. May administer meds with sips of water.               SLP Swallowing Diagnosis: R/O pharyngeal dysphagia, suspected pharyngeal dysphagia (05/01/25 1500)

## 2025-05-01 NOTE — PROGRESS NOTES
cxr  Name: Caitlyn Longoria ADMIT: 2025   : 1934  PCP: Zenaida Suarez MD    MRN: [id number] LOS: 6 days   AGE/SEX: 89 y.o. female  ROOM: [facility]     Subjective   Subjective   More coughing. She feels like sputum gets stuck and when she has shortness of breath.     Objective   Objective   Vital Signs  Temp:  [97.5 °F (36.4 °C)-98.4 °F (36.9 °C)] 97.5 °F (36.4 °C)  Heart Rate:  [80-81] 80  Resp:  [18-20] 18  BP: ()/(53-65) 114/56  SpO2:  [96 %-100 %] 100 %  on  Flow (L/min) (Oxygen Therapy):  [2] 2;   Device (Oxygen Therapy): nasal cannula  Body mass index is 32.05 kg/m².    Physical Exam  Constitutional:       General: She is not in acute distress.     Appearance: She is ill-appearing. She is not toxic-appearing.   HENT:      Head: Normocephalic and atraumatic.   Cardiovascular:      Rate and Rhythm: Normal rate and regular rhythm.   Pulmonary:      Breath sounds: Rhonchi present. No wheezing.   Abdominal:      General: Bowel sounds are normal.      Palpations: Abdomen is soft.      Tenderness: There is no abdominal tenderness. There is no guarding or rebound.   Musculoskeletal:      Comments: Right lower extremity dressing   Skin:     General: Skin is warm and dry.   Neurological:      General: No focal deficit present.      Mental Status: She is alert and oriented to person, place, and time.   Psychiatric:         Mood and Affect: Mood normal.         Behavior: Behavior normal.     Results Review  I reviewed the patient's new clinical results.  Results from last 7 days   Lab Units 25  0526 25  0349 25  0259 25  0438   WBC 10*3/mm3 15.18* 15.54* 14.35* 16.11*   HEMOGLOBIN g/dL 10.2* 10.5* 10.3* 10.6*   PLATELETS 10*3/mm3 119* 113* 108* 108*     Results from last 7 days   Lab Units 25  0526 25  1522 25  0349 25  0259 25  0438   SODIUM mmol/L 138  --  139 136 141   POTASSIUM mmol/L 3.9 4.4 3.6 3.8 4.1   CHLORIDE mmol/L 104  --  104  "104 109*   CO2 mmol/L 23.0  --  25.2 22.8 23.0   BUN mg/dL 10  --  12 11 11   CREATININE mg/dL 0.69  --  0.77 0.73 0.68   GLUCOSE mg/dL 164*  --  169* 144* 125*     Lab Results   Component Value Date    ANIONGAP 11.0 05/01/2025     Estimated Creatinine Clearance: 55 mL/min (by C-G formula based on SCr of 0.69 mg/dL).   Lab Results   Component Value Date    EGFR 82.6 05/01/2025     Results from last 7 days   Lab Units 04/27/25  0336 04/25/25  1237   ALBUMIN g/dL 2.5* 3.3*   BILIRUBIN mg/dL 0.8 1.3*   ALK PHOS U/L 129* 109   AST (SGOT) U/L 89* 23   ALT (SGPT) U/L 56* 45*     Results from last 7 days   Lab Units 05/01/25  0526 04/30/25  0349 04/29/25  0259 04/28/25  0438 04/27/25  0336 04/26/25  0604 04/25/25  1237   CALCIUM mg/dL 8.2 8.7 8.4 8.2 7.9*   < > 8.9   ALBUMIN g/dL  --   --   --   --  2.5*  --  3.3*    < > = values in this interval not displayed.     Results from last 7 days   Lab Units 04/25/25  1550 04/25/25  1237   PROCALCITONIN ng/mL  --  0.27*   LACTATE mmol/L 1.2 2.1*     No results found for: \"HGBA1C\", \"POCGLU\"    No radiology results for the last day    Results from last 7 days   Lab Units 05/01/25  0525 04/30/25  0350 04/29/25  0259   INR  2.41* 2.51* 2.18*      Scheduled Meds  budesonide, 0.5 mg, Nebulization, BID - RT  calcium 500 mg vitamin D 5 mcg (200 UT), 1 tablet, Oral, BID  cefTRIAXone, 2,000 mg, Intravenous, Q24H  cetirizine, 10 mg, Oral, BID  cholecalciferol, 1,000 Units, Oral, Daily  fluticasone, 1 spray, Nasal, Daily  guaiFENesin, 1,200 mg, Oral, Q12H  ipratropium-albuterol, 3 mL, Nebulization, Q6H While Awake - RT  Menthol-Zinc Oxide, 1 Application, Topical, Q12H  methylPREDNISolone sodium succinate, 40 mg, Intravenous, Q12H  miconazole, 1 Application, Topical, Q12H  montelukast, 10 mg, Oral, Nightly  oxybutynin XL, 10 mg, Oral, BID  [START ON 5/2/2025] predniSONE, 40 mg, Oral, Daily With Breakfast  rosuvastatin, 20 mg, Oral, Nightly  sodium chloride, 4 mL, Nebulization, BID - " RT  vancomycin, 1,250 mg, Intravenous, Q24H  warfarin, 2 mg, Oral, Daily    Continuous Infusions  Pharmacy to dose vancomycin,   Pharmacy to dose warfarin,     PRN Meds    acetaminophen **OR** acetaminophen **OR** acetaminophen    albuterol    senna-docusate sodium **AND** polyethylene glycol **AND** bisacodyl **AND** bisacodyl    Calcium Replacement - Follow Nurse / BPA Driven Protocol    Magnesium Standard Dose Replacement - Follow Nurse / BPA Driven Protocol    melatonin    ondansetron ODT **OR** ondansetron    Pharmacy to dose vancomycin    Pharmacy to dose warfarin    Phosphorus Replacement - Follow Nurse / BPA Driven Protocol    Potassium Replacement - Follow Nurse / BPA Driven Protocol    [COMPLETED] Insert Peripheral IV **AND** sodium chloride    Pharmacy to dose vancomycin,   Pharmacy to dose warfarin,     Diet  Diet: Cardiac; Healthy Heart (2-3 Na+); Fluid Consistency: Thin (IDDSI 0)       Assessment/Plan     Active Hospital Problems    Diagnosis  POA    **Cellulitis of right lower extremity [L03.115]  Yes    Moderate protein-calorie malnutrition [E44.0]  Yes    Thrombocytopenia [D69.6]  Yes    Sick sinus syndrome [I49.5]  Yes    Chronic HFrEF (heart failure with reduced ejection fraction) [I50.22]  Yes    COPD (chronic obstructive pulmonary disease) [J44.9]  Yes    Bilateral carotid artery disease [I77.9]  Yes    Anticoagulated on Coumadin [Z79.01]  Not Applicable    CLL (chronic lymphocytic leukemia) [C91.10]  Yes    HTN (hypertension) [I10]  Yes    CKD (chronic kidney disease), stage III [N18.30]  Yes    Paroxysmal atrial fibrillation [I48.0]  Yes    Type 2 diabetes mellitus [E11.9]  Yes    HLD (hyperlipidemia) [E78.5]  Yes    Coronary artery disease [I25.10]  Yes      Resolved Hospital Problems   No resolved problems to display.     Patient is a 89 y.o. 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 Miller  - patient developed RLE cellulitis while on  suppressive keflex  - pt initially with borderline low Bps 70s/40s- she responded well to IVF, did not meet sepsis criteria (WBC elevation only)  -RLE doppler NEGATIVE for DVT  - Monitoring WBC, blood cx are NGTD  - Continue couple more days IV abx with po abx plan at dc (cefpodoxime and doxycycline)     Atrial fibrillation on warfarin  S/p pacemaker  HTN  HLD  - pt was hypotensive on arrival to ED so her coreg and loop diuretic were held, she responded to IV fluids- continue to hold BP meds until BP is more improved/stable-low normal currently  - cont statin  - pharmacy to dose warfarin  - add back home coreg 4/29     COPD/chronic hypoxic respiratory failure on home O2  Asthma exacerbation  -Pulmonology adding IV steroids for exacerbation 05/01/25   -Check chest x-ray and respiratory panel  - continue home inhalers     CHARLENE on home bipap  - Patient reports that she is unable to bring her BiPAP machine in from home as she is worried it is going to be broken during transit which has happened previously  - Pulmonology following     CLL- has never required tx/follows with Dr. Larkin at [facility]  Hypogammaglobulinemia on monthly IVIG     Weakness  - acute on chronic  - Planning for SNF    DVT prophylaxis  Warfarin (home med)    Discharge  A couple days SNF  Expected Discharge Date: 5/2/2025; Expected Discharge Time:     Discussed with patient, nursing staff, care team on multidisciplinary rounds, and CCP    Steve Khan MD  [facility]  05/01/25 09:55 EDT

## 2025-05-01 NOTE — PROGRESS NOTES
[facility] Clinical Pharmacy Services: Clinical Pharmacy Consult - Warfarin Dosing/Monitoring    Results from last 7 days   Lab Units 05/01/25  0525 04/30/25  0350 04/29/25  0259 04/28/25  0438 04/27/25  1224   INR  2.41* 2.51* 2.18* 2.06* 1.89*     Dose Ordered: 2 mg daily  Comments: INR stable at home dosage, will continue. DDI with ceftriaxone and ID has not finalized their plan at this time so will continue to monitor INR daily. Right leg is subjectively improved.    Pharmacy will continue to follow until discharge or discontinuation of warfarin.    Yousuf Pulliam [facility]  Clinical Pharmacist

## 2025-05-01 NOTE — PLAN OF CARE
Goal Outcome Evaluation:  Plan of Care Reviewed With: patient        Progress: no change  Outcome Evaluation: VSS afebrile no complaints. uneventful shift. Plan of care on going.

## 2025-05-02 ENCOUNTER — APPOINTMENT (OUTPATIENT)
Dept: GENERAL RADIOLOGY | Facility: HOSPITAL | Age: OVER 89
DRG: 638 | End: 2025-05-02
Payer: MEDICARE

## 2025-05-02 LAB
ALBUMIN SERPL-MCNC: 2.7 G/DL (ref 3.5–5.2)
ALP SERPL-CCNC: 270 U/L (ref 39–117)
ALT SERPL W P-5'-P-CCNC: 57 U/L (ref 1–33)
ANION GAP SERPL CALCULATED.3IONS-SCNC: 13 MMOL/L (ref 5–15)
AST SERPL-CCNC: 26 U/L (ref 1–32)
BILIRUB CONJ SERPL-MCNC: 0.3 MG/DL (ref 0–0.3)
BILIRUB INDIRECT SERPL-MCNC: 0.2 MG/DL
BILIRUB SERPL-MCNC: 0.5 MG/DL (ref 0–1.2)
BUN SERPL-MCNC: 14 MG/DL (ref 8–23)
BUN/CREAT SERPL: 15.7 (ref 7–25)
CALCIUM SPEC-SCNC: 8.8 MG/DL (ref 8.2–9.6)
CHLORIDE SERPL-SCNC: 102 MMOL/L (ref 98–107)
CO2 SERPL-SCNC: 23 MMOL/L (ref 22–29)
CREAT SERPL-MCNC: 0.89 MG/DL (ref 0.57–1)
DEPRECATED RDW RBC AUTO: 46.4 FL (ref 37–54)
EGFRCR SERPLBLD CKD-EPI 2021: 61.7 ML/MIN/1.73
ERYTHROCYTE [DISTWIDTH] IN BLOOD BY AUTOMATED COUNT: 13 % (ref 12.3–15.4)
GLUCOSE SERPL-MCNC: 253 MG/DL (ref 65–99)
HCT VFR BLD AUTO: 36.1 % (ref 34–46.6)
HGB BLD-MCNC: 11.1 G/DL (ref 12–15.9)
INR PPP: 3.18 (ref 0.9–1.1)
MCH RBC QN AUTO: 30.5 PG (ref 26.6–33)
MCHC RBC AUTO-ENTMCNC: 30.7 G/DL (ref 31.5–35.7)
MCV RBC AUTO: 99.2 FL (ref 79–97)
PLATELET # BLD AUTO: 162 10*3/MM3 (ref 140–450)
PMV BLD AUTO: 10.5 FL (ref 6–12)
POTASSIUM SERPL-SCNC: 4.8 MMOL/L (ref 3.5–5.2)
PROT SERPL-MCNC: 5.4 G/DL (ref 6–8.5)
PROTHROMBIN TIME: 33 SECONDS (ref 11.7–14.2)
RBC # BLD AUTO: 3.64 10*6/MM3 (ref 3.77–5.28)
SODIUM SERPL-SCNC: 138 MMOL/L (ref 136–145)
WBC NRBC COR # BLD AUTO: 23.19 10*3/MM3 (ref 3.4–10.8)

## 2025-05-02 PROCEDURE — 80048 BASIC METABOLIC PNL TOTAL CA: CPT | Performed by: STUDENT IN AN ORGANIZED HEALTH CARE EDUCATION/TRAINING PROGRAM

## 2025-05-02 PROCEDURE — 94799 UNLISTED PULMONARY SVC/PX: CPT

## 2025-05-02 PROCEDURE — 85610 PROTHROMBIN TIME: CPT | Performed by: STUDENT IN AN ORGANIZED HEALTH CARE EDUCATION/TRAINING PROGRAM

## 2025-05-02 PROCEDURE — 63710000001 PREDNISONE PER 1 MG: Performed by: INTERNAL MEDICINE

## 2025-05-02 PROCEDURE — 94761 N-INVAS EAR/PLS OXIMETRY MLT: CPT

## 2025-05-02 PROCEDURE — 94664 DEMO&/EVAL PT USE INHALER: CPT

## 2025-05-02 PROCEDURE — 97530 THERAPEUTIC ACTIVITIES: CPT

## 2025-05-02 PROCEDURE — 92611 MOTION FLUOROSCOPY/SWALLOW: CPT

## 2025-05-02 PROCEDURE — 25010000002 CEFTRIAXONE PER 250 MG: Performed by: INTERNAL MEDICINE

## 2025-05-02 PROCEDURE — 85027 COMPLETE CBC AUTOMATED: CPT | Performed by: HOSPITALIST

## 2025-05-02 PROCEDURE — G0545 PR INHERENT VISIT TO INPT: HCPCS | Performed by: INTERNAL MEDICINE

## 2025-05-02 PROCEDURE — 99233 SBSQ HOSP IP/OBS HIGH 50: CPT | Performed by: INTERNAL MEDICINE

## 2025-05-02 PROCEDURE — 80076 HEPATIC FUNCTION PANEL: CPT | Performed by: HOSPITALIST

## 2025-05-02 PROCEDURE — 74230 X-RAY XM SWLNG FUNCJ C+: CPT

## 2025-05-02 RX ADMIN — BUDESONIDE 0.5 MG: 0.5 INHALANT RESPIRATORY (INHALATION) at 07:03

## 2025-05-02 RX ADMIN — OXYBUTYNIN CHLORIDE 10 MG: 10 TABLET, EXTENDED RELEASE ORAL at 20:39

## 2025-05-02 RX ADMIN — BARIUM SULFATE 4 ML: 980 POWDER, FOR SUSPENSION ORAL at 08:36

## 2025-05-02 RX ADMIN — ROSUVASTATIN CALCIUM 20 MG: 10 TABLET, FILM COATED ORAL at 20:39

## 2025-05-02 RX ADMIN — CETIRIZINE HYDROCHLORIDE 10 MG: 10 TABLET ORAL at 20:37

## 2025-05-02 RX ADMIN — CALCIUM CARBONATE-VITAMIN D TAB 500 MG-200 UNIT 1 TABLET: 500-200 TAB at 20:37

## 2025-05-02 RX ADMIN — OXYBUTYNIN CHLORIDE 10 MG: 10 TABLET, EXTENDED RELEASE ORAL at 09:32

## 2025-05-02 RX ADMIN — FLUTICASONE PROPIONATE 1 SPRAY: 50 SPRAY, METERED NASAL at 09:37

## 2025-05-02 RX ADMIN — CETIRIZINE HYDROCHLORIDE 10 MG: 10 TABLET ORAL at 09:32

## 2025-05-02 RX ADMIN — ANTI-FUNGAL POWDER MICONAZOLE NITRATE TALC FREE 1 APPLICATION: 1.42 POWDER TOPICAL at 20:44

## 2025-05-02 RX ADMIN — MONTELUKAST 10 MG: 10 TABLET, FILM COATED ORAL at 20:37

## 2025-05-02 RX ADMIN — ANTI-FUNGAL POWDER MICONAZOLE NITRATE TALC FREE 1 APPLICATION: 1.42 POWDER TOPICAL at 09:38

## 2025-05-02 RX ADMIN — Medication 4 ML: at 07:03

## 2025-05-02 RX ADMIN — DOXYCYCLINE 100 MG: 100 CAPSULE ORAL at 20:39

## 2025-05-02 RX ADMIN — Medication 1 APPLICATION: at 09:37

## 2025-05-02 RX ADMIN — SODIUM CHLORIDE 2000 MG: 9 INJECTION, SOLUTION INTRAVENOUS at 17:36

## 2025-05-02 RX ADMIN — IPRATROPIUM BROMIDE AND ALBUTEROL SULFATE 3 ML: .5; 3 SOLUTION RESPIRATORY (INHALATION) at 12:00

## 2025-05-02 RX ADMIN — IPRATROPIUM BROMIDE AND ALBUTEROL SULFATE 3 ML: .5; 3 SOLUTION RESPIRATORY (INHALATION) at 07:03

## 2025-05-02 RX ADMIN — Medication 1000 UNITS: at 09:32

## 2025-05-02 RX ADMIN — BUDESONIDE 0.5 MG: 0.5 INHALANT RESPIRATORY (INHALATION) at 21:00

## 2025-05-02 RX ADMIN — IPRATROPIUM BROMIDE AND ALBUTEROL SULFATE 3 ML: .5; 3 SOLUTION RESPIRATORY (INHALATION) at 20:57

## 2025-05-02 RX ADMIN — GUAIFENESIN 1200 MG: 600 TABLET, EXTENDED RELEASE ORAL at 20:37

## 2025-05-02 RX ADMIN — PREDNISONE 40 MG: 20 TABLET ORAL at 09:32

## 2025-05-02 RX ADMIN — GUAIFENESIN 1200 MG: 600 TABLET, EXTENDED RELEASE ORAL at 09:32

## 2025-05-02 RX ADMIN — BARIUM SULFATE 50 ML: 400 SUSPENSION ORAL at 08:36

## 2025-05-02 RX ADMIN — DOXYCYCLINE 100 MG: 100 CAPSULE ORAL at 09:32

## 2025-05-02 RX ADMIN — Medication 1 APPLICATION: at 20:44

## 2025-05-02 RX ADMIN — BARIUM SULFATE 55 ML: 0.81 POWDER, FOR SUSPENSION ORAL at 08:36

## 2025-05-02 RX ADMIN — CALCIUM CARBONATE-VITAMIN D TAB 500 MG-200 UNIT 1 TABLET: 500-200 TAB at 09:32

## 2025-05-02 RX ADMIN — Medication 4 ML: at 21:02

## 2025-05-02 NOTE — PLAN OF CARE
Goal Outcome Evaluation:  Plan of Care Reviewed With: patient        Progress: no change  Outcome Evaluation: Pt was seen for Pt tx this AM. Pt was in bed w/ many questions about diet. Notified RN later about questions and requests. Pt sat up to EOB w/ SBa and incr time. Pt stood w/ min A + fww. Posterior lean noted w/ pt reporting she has her own method for standing in spite of cues provided. Pt amb approx 1' to chair w/ small, slow steps req min A x 1 + fww. No overt LOB occurred. Pt fatigued quickly w/ small activity throughout session w/ SOA noted and req rests of approx 2 min after each activity. Rec SNF after dc. Cup of water was brought to pt w/ RN verbally providing okay.    Anticipated Discharge Disposition (PT): skilled nursing facility

## 2025-05-02 NOTE — PROGRESS NOTES
LOS: 7 days     Chief Complaint: Right lower extremity cellulitis    Interval History: No fever.  She is still having some upper airway congestion and intermittent cough.  She received a dose of IV steroids yesterday which is the likely cause of her increased WBC today.  She is on room air.  She has a swallow study done this morning and her diet was modified due to concerns for aspiration.    She says her legs are stable.  She is tolerating vancomycin and ceftriaxone without any side effects.    Medications:    Current Facility-Administered Medications:     acetaminophen (TYLENOL) tablet 650 mg, 650 mg, Oral, Q4H PRN **OR** acetaminophen (TYLENOL) 160 MG/5ML oral solution 650 mg, 650 mg, Oral, Q4H PRN **OR** acetaminophen (TYLENOL) suppository 650 mg, 650 mg, Rectal, Q4H PRN, Jojo Garcia MD    albuterol (PROVENTIL) nebulizer solution 0.083% 2.5 mg/3mL, 2.5 mg, Nebulization, Q6H PRN, Jojo Garcia MD, 2.5 mg at 04/27/25 1545    sennosides-docusate (PERICOLACE) 8.6-50 MG per tablet 2 tablet, 2 tablet, Oral, BID PRN **AND** polyethylene glycol (MIRALAX) packet 17 g, 17 g, Oral, Daily PRN **AND** bisacodyl (DULCOLAX) EC tablet 5 mg, 5 mg, Oral, Daily PRN **AND** bisacodyl (DULCOLAX) suppository 10 mg, 10 mg, Rectal, Daily PRN, Jojo Garcia MD    budesonide (PULMICORT) nebulizer solution 0.5 mg, 0.5 mg, Nebulization, BID - RT, Jojo Garcia MD, 0.5 mg at 05/02/25 0703    calcium 500 mg vitamin D 5 mcg (200 UT) per tablet 1 tablet, 1 tablet, Oral, BID, Jojo Garcia MD, 1 tablet at 05/02/25 0932    Calcium Replacement - Follow Nurse / BPA Driven Protocol, , Not Applicable, PRN, Jojo Garcia MD    cefTRIAXone (ROCEPHIN) 2,000 mg in sodium chloride 0.9 % 100 mL MBP, 2,000 mg, Intravenous, Q24H, Mehul Miller MD, Last Rate: 200 mL/hr at 05/01/25 1728, 2,000 mg at 05/01/25 1728    cetirizine (zyrTEC) tablet 10 mg, 10 mg, Oral, BID, Lora Leroy,  MD, 10 mg at 05/02/25 0932    cholecalciferol (VITAMIN D3) tablet 1,000 Units, 1,000 Units, Oral, Daily, Jojo Garcia MD, 1,000 Units at 05/02/25 0932    doxycycline (MONODOX) capsule 100 mg, 100 mg, Oral, Q12H, Mehul Miller MD, 100 mg at 05/02/25 0932    fluticasone (FLONASE) 50 MCG/ACT nasal spray 1 spray, 1 spray, Nasal, Daily, Lora Leroy MD, 1 spray at 05/02/25 0937    guaiFENesin (MUCINEX) 12 hr tablet 1,200 mg, 1,200 mg, Oral, Q12H, Lora Leroy MD, 1,200 mg at 05/02/25 0932    ipratropium-albuterol (DUO-NEB) nebulizer solution 3 mL, 3 mL, Nebulization, Q6H While Awake - RT, Jojo Garcia MD, 3 mL at 05/02/25 0703    Magnesium Standard Dose Replacement - Follow Nurse / BPA Driven Protocol, , Not Applicable, PRN, Jojo Garcia MD    melatonin tablet 2.5 mg, 2.5 mg, Oral, Nightly PRN, Jojo Garcia MD    Menthol-Zinc Oxide 1 Application, 1 Application, Topical, Q12H, Lora Leroy MD, 1 Application at 05/02/25 0937    miconazole (MICOTIN) 2 % powder 1 Application, 1 Application, Topical, Q12H, Jojo Garcia MD, 1 Application at 05/02/25 0938    montelukast (SINGULAIR) tablet 10 mg, 10 mg, Oral, Nightly, Jojo Garcia MD, 10 mg at 05/01/25 2019    ondansetron ODT (ZOFRAN-ODT) disintegrating tablet 4 mg, 4 mg, Oral, Q6H PRN **OR** ondansetron (ZOFRAN) injection 4 mg, 4 mg, Intravenous, Q6H PRN, Jojo Garcia MD    oxybutynin XL (DITROPAN-XL) 24 hr tablet 10 mg, 10 mg, Oral, BID, Jojo Garcia MD, 10 mg at 05/02/25 0932    Pharmacy to dose warfarin, , Not Applicable, Continuous PRRadha CARDENAS Renate Andrea, MD    Phosphorus Replacement - Follow Nurse / BPA Driven Protocol, , Not Applicable, Radha YEE Renate Andrea, MD    Potassium Replacement - Follow Nurse / BPA Driven Protocol, , Not Applicable, Radha YEE Renate Andrea, MD    predniSONE (DELTASONE) tablet 40 mg, 40 mg, Oral, Daily With Breakfast,  Santiago Romero MD, 40 mg at 05/02/25 0932    rosuvastatin (CRESTOR) tablet 20 mg, 20 mg, Oral, Nightly, Jojo Garcia MD, 20 mg at 05/01/25 2019    [COMPLETED] Insert Peripheral IV, , , Once **AND** sodium chloride 0.9 % flush 10 mL, 10 mL, Intravenous, PRN, Noah Saunders MD    sodium chloride 7 % nebulizer solution nebulizer solution 4 mL, 4 mL, Nebulization, BID - RT, Jojo Garcia MD, 4 mL at 05/02/25 0703      Vital Signs  Temp:  [97.5 °F (36.4 °C)-98.1 °F (36.7 °C)] 97.5 °F (36.4 °C)  Heart Rate:  [80-83] 80  Resp:  [18] 18  BP: ()/(65-78) 109/67    Physical Exam:  General: NAD, very nice, sitting up in chair  Cardiovascular: NR, BLE edema  Respiratory: Breathing comfortably on RA  GI: Not distended or tender; soft  : No Mancilla catheter  Skin: RLE wrapped; no erythema outside of the dressing; LLE with chronic venous stasis changes     Results Review:    CBC, BMP, and RPP reviewed today  Lab Results   Component Value Date    WBC 23.19 (H) 05/02/2025    HGB 11.1 (L) 05/02/2025    HCT 36.1 05/02/2025    MCV 99.2 (H) 05/02/2025     05/02/2025     Lab Results   Component Value Date    GLUCOSE 253 (H) 05/02/2025    CALCIUM 8.8 05/02/2025     05/02/2025    K 4.8 05/02/2025    CO2 23.0 05/02/2025     05/02/2025    BUN 14 05/02/2025    CREATININE 0.89 05/02/2025    EGFR 61.7 05/02/2025    BCR 15.7 05/02/2025    ANIONGAP 13.0 05/02/2025     Lab Results   Component Value Date    CRP 8.43 (H) 05/01/2025     Lab Results   Component Value Date    VANCOTROUGH 12.70 04/28/2025    VANCORANDOM 25.60 08/27/2024       Microbiology:  4/25 BCx negative, final  5/1 RPP: Negative    New radiology:  5/2 CXR personally reviewed and is negative for pneumonia on my read; there does appear to be some atelectasis in the lower lobes.  There is evidence of cardiomegaly and a left pacemaker is present    Prior radiology:  RLE Doppler negative for DVT    Assessment & Plan   #1 RLE  cellulitis  #2 history of right knee PJI on chronic suppressive cephalexin  #3 obesity BMI 31  #4 hypogammaglobulinemia-complicating above  #5 leukocytosis-exacerbated by steroids    She remains afebrile.  Her blood cultures are negative.  CRP has been trending down nicely.  The appearance of the right leg and her subjective symptoms are much better.    WBC increased today due to high-dose IV steroids yesterday to help with her breathing.    The antibiotic plan going forward is as follows: She received her last dose of vancomycin yesterday and will start doxycycline 100 mg p.o. twice daily today.    While in the hospital, continue ceftriaxone 2 g IV every 24 hours but at discharge this can be changed to cefpodoxime 400 mg p.o. every 12 hours.    Stop date for doxycycline and cefpodoxime will be 5/9/2025 which will complete a 14-day total course.    I told her to hold her chronic suppressive cephalexin 500 mg p.o. every 8 hours (for suppression of right knee PJI) for now given her broad-spectrum antibiotic use.  She will resume this chronic medication on 5/10/2025.  She expressed understanding when antibiotic education provided.    Antibiotic plan Case discussed with Dr. Khan today.    Thank you for allowing me to be involved in the care of this patient. Infectious diseases will sign off at this time with antibiotics plan in place, but please call me at [phone] if any further ID questions or new ID concerns.

## 2025-05-02 NOTE — PLAN OF CARE
Goal Outcome Evaluation:  Plan of Care Reviewed With: patient        Progress: improving  Outcome Evaluation: No c/o pain or soa. Currently on room air. Continues on PO steroids and IV rocephin. VFSS completed this morning, diet adjusted to [facility] ground, double swallowing. Up in chair most of the day. Nutrition consulted per patient request. Possible discharge this weekend.

## 2025-05-02 NOTE — PLAN OF CARE
Goal Outcome Evaluation:              Outcome Evaluation: VFSS complete. Radiologist, Dr. Mcfarland, present during the study. Breathing pattern impacts coordination of swallow. Recommend mechanical ground diet with thin liquids, NO mixed consistencies. Meds whole or crushed in puree. Sitting upright, slow rate, small single bites/sips, double swallows, volitional throat clear. With negative lung changes, notify SLP and downgrade to nectar thick liquids.    ADDENDUM: RN informed SLP that pt wishes to take meds with water. SLP explained risk of aspiration and recommendation for 'no mixed consistencies'. Safest med administration is whole or crushed in puree with double swallows. RN verbalized understanding. Defer further med administration to MD.

## 2025-05-02 NOTE — CASE MANAGEMENT/SOCIAL WORK
Continued Stay Note  [facility]     Patient Name: Caitlyn Longoria  MRN: [id number]  Today's Date: 5/2/2025    Admit Date: 4/25/2025    Plan: [facility], Per Gurpreet can accept over weekend. Has home BiPAP.   Discharge Plan       Row Name 05/02/25 1734       Plan    Plan [facility], Per Latonya/Liz can accept over weekend. Has home BiPAP.    Patient/Family in Agreement with Plan yes    Plan Comments Poss D/C over weekend. Per Latonya/Liz, can accept over weekend. VFSS completed and ST recommending mechanical ground diet with thin liquids, NO mixed consistencies. Meds whole or crushed in puree. Deepak Peraza RN-BC                   Discharge Codes    No documentation.                 Expected Discharge Date and Time       Expected Discharge Date Expected Discharge Time    May 3, 2025               Deepak Peraza, RN

## 2025-05-02 NOTE — CONSULTS
Nutrition Services    Patient Name: Caitlyn Longoria  YOB: 1934  MRN: [id number]  Admission date: 4/25/2025    PROGRESS NOTE      Encounter Information: Nutrition Consult    Pt concerned about a mechanical ground diet, reports she isn't familiar with what she needs to do to maintain when she is out of the hospital. RD noted she does currently live in an ILF but recognizes she will not be able to continue independent living in her current condition.     Pt has been drinking the Ash and likes the taste. She inquired about being able to get the supplement in the outpatient setting.    RD provided nutrition education materials r/t soft mechanical diets, a hospital menu, and some outpatient information on Ash.       PO Diet: Diet: Cardiac; Healthy Heart (2-3 Na+); No Mixed Consistencies; Texture: Mechanical Ground (NDD 2); Fluid Consistency: Thin (IDDSI 0)   PO Supplements: Ash, BID   PO Intake:  ~75%       Weight: Weight: 81.4 kg (179 lb 6.4 oz) (05/02/25 0640)       Medications: reviewed   Labs: reviewed        GI Function:  WDL, last bowel movement: 4/28  Pt recognized it has been several days but doesn't believe she is eating enough to support a bm       Nutrition Intervention Updates: Continue to monitor and encourage PO intake.     Provided mechanical soft nutrition education materials from the Nutrition Care Manual.        Results from last 7 days   Lab Units 05/02/25  0503 05/01/25  0526 04/30/25  1522 04/30/25  0349 04/28/25  0438 04/27/25  0336   SODIUM mmol/L 138 138  --  139   < > 141   POTASSIUM mmol/L 4.8 3.9 4.4 3.6   < > 4.2   CHLORIDE mmol/L 102 104  --  104   < > 111*   CO2 mmol/L 23.0 23.0  --  25.2   < > 22.5   BUN mg/dL 14 10  --  12   < > 14   CREATININE mg/dL 0.89 0.69  --  0.77   < > 0.71   CALCIUM mg/dL 8.8 8.2  --  8.7   < > 7.9*   BILIRUBIN mg/dL 0.5  --   --   --   --  0.8   ALK PHOS U/L 270*  --   --   --   --  129*   ALT (SGPT) U/L 57*  --   --   --   --  56*   AST  (SGOT) U/L 26  --   --   --   --  89*   GLUCOSE mg/dL 253* 164*  --  169*   < > 102*    < > = values in this interval not displayed.     Results from last 7 days   Lab Units 05/02/25  0503   HEMOGLOBIN g/dL 11.1*   HEMATOCRIT % 36.1     COVID19   Date Value Ref Range Status   05/01/2025 Not Detected Not Detected - Ref. Range Final     Lab Results   Component Value Date    HGBA1C 6.80 (H) 08/23/2024       RD to follow up per protocol.    Electronically signed by:  Ann-Marie Akbar RD  05/02/25 14:38 EDT

## 2025-05-02 NOTE — PROGRESS NOTES
cxr  Name: Caitlyn Longoria ADMIT: 2025   : 1934  PCP: Zenaida Suarez MD    MRN: [id number] LOS: 7 days   AGE/SEX: 89 y.o. female  ROOM: [facility]     Subjective   Subjective   She feels okay currently. Just back from VFSS     Objective   Objective   Vital Signs  Temp:  [97.5 °F (36.4 °C)-98.1 °F (36.7 °C)] 97.5 °F (36.4 °C)  Heart Rate:  [80-83] 80  Resp:  [18] 18  BP: ()/(65-78) 109/67  SpO2:  [94 %-100 %] 100 %  on  Flow (L/min) (Oxygen Therapy):  [2] 2;   Device (Oxygen Therapy): nasal cannula  Body mass index is 31.79 kg/m².    Physical Exam  Constitutional:       General: She is not in acute distress.     Appearance: She is ill-appearing. She is not toxic-appearing.   HENT:      Head: Normocephalic and atraumatic.   Cardiovascular:      Rate and Rhythm: Normal rate and regular rhythm.   Pulmonary:      Breath sounds: Wheezing present.   Abdominal:      General: Bowel sounds are normal.      Palpations: Abdomen is soft.      Tenderness: There is no abdominal tenderness. There is no guarding or rebound.   Musculoskeletal:      Comments: Right lower extremity dressing   Skin:     General: Skin is warm and dry.   Neurological:      General: No focal deficit present.      Mental Status: She is alert and oriented to person, place, and time.   Psychiatric:         Mood and Affect: Mood normal.         Behavior: Behavior normal.     Results Review  I reviewed the patient's new clinical results.  Results from last 7 days   Lab Units 25  0503 25  0526 25  0349 25  0259   WBC 10*3/mm3 23.19* 15.18* 15.54* 14.35*   HEMOGLOBIN g/dL 11.1* 10.2* 10.5* 10.3*   PLATELETS 10*3/mm3 162 119* 113* 108*     Results from last 7 days   Lab Units 25  0503 25  0526 25  1522 25  0349 25  0259   SODIUM mmol/L 138 138  --  139 136   POTASSIUM mmol/L 4.8 3.9 4.4 3.6 3.8   CHLORIDE mmol/L 102 104  --  104 104   CO2 mmol/L 23.0 23.0  --  25.2 22.8   BUN mg/dL  "14 10  --  12 11   CREATININE mg/dL 0.89 0.69  --  0.77 0.73   GLUCOSE mg/dL 253* 164*  --  169* 144*     Lab Results   Component Value Date    ANIONGAP 13.0 05/02/2025     Estimated Creatinine Clearance: 42.4 mL/min (by C-G formula based on SCr of 0.89 mg/dL).   Lab Results   Component Value Date    EGFR 61.7 05/02/2025     Results from last 7 days   Lab Units 05/02/25  0503 04/27/25  0336 04/25/25  1237   ALBUMIN g/dL 2.7* 2.5* 3.3*   BILIRUBIN mg/dL 0.5 0.8 1.3*   ALK PHOS U/L 270* 129* 109   AST (SGOT) U/L 26 89* 23   ALT (SGPT) U/L 57* 56* 45*     Results from last 7 days   Lab Units 05/02/25  0503 05/01/25  0526 04/30/25  0349 04/29/25  0259 04/28/25  0438 04/27/25  0336 04/26/25  0604 04/25/25  1237   CALCIUM mg/dL 8.8 8.2 8.7 8.4   < > 7.9*   < > 8.9   ALBUMIN g/dL 2.7*  --   --   --   --  2.5*  --  3.3*    < > = values in this interval not displayed.     Results from last 7 days   Lab Units 04/25/25  1550 04/25/25  1237   PROCALCITONIN ng/mL  --  0.27*   LACTATE mmol/L 1.2 2.1*     No results found for: \"HGBA1C\", \"POCGLU\"    XR Chest 1 View  Result Date: 5/1/2025  As described.  This report was finalized on 5/1/2025 1:40 PM by Dr. Mitul Hudson M.D on Workstation: [id number]        Results from last 7 days   Lab Units 05/02/25  0503 05/01/25  0525 04/30/25  0350   INR  3.18* 2.41* 2.51*      Scheduled Meds  budesonide, 0.5 mg, Nebulization, BID - RT  calcium 500 mg vitamin D 5 mcg (200 UT), 1 tablet, Oral, BID  cefTRIAXone, 2,000 mg, Intravenous, Q24H  cetirizine, 10 mg, Oral, BID  cholecalciferol, 1,000 Units, Oral, Daily  doxycycline, 100 mg, Oral, Q12H  fluticasone, 1 spray, Nasal, Daily  guaiFENesin, 1,200 mg, Oral, Q12H  ipratropium-albuterol, 3 mL, Nebulization, Q6H While Awake - RT  Menthol-Zinc Oxide, 1 Application, Topical, Q12H  miconazole, 1 Application, Topical, Q12H  montelukast, 10 mg, Oral, Nightly  oxybutynin XL, 10 mg, Oral, BID  predniSONE, 40 mg, Oral, Daily With " Breakfast  rosuvastatin, 20 mg, Oral, Nightly  sodium chloride, 4 mL, Nebulization, BID - RT    Continuous Infusions  Pharmacy to dose warfarin,     PRN Meds    acetaminophen **OR** acetaminophen **OR** acetaminophen    albuterol    senna-docusate sodium **AND** polyethylene glycol **AND** bisacodyl **AND** bisacodyl    Calcium Replacement - Follow Nurse / BPA Driven Protocol    Magnesium Standard Dose Replacement - Follow Nurse / BPA Driven Protocol    melatonin    ondansetron ODT **OR** ondansetron    Pharmacy to dose warfarin    Phosphorus Replacement - Follow Nurse / BPA Driven Protocol    Potassium Replacement - Follow Nurse / BPA Driven Protocol    [COMPLETED] Insert Peripheral IV **AND** sodium chloride    Pharmacy to dose warfarin,     Diet  Diet: Cardiac; Healthy Heart (2-3 Na+); No Mixed Consistencies; Texture: Mechanical Ground (NDD 2); Fluid Consistency: Thin (IDDSI 0)       Assessment/Plan     Active Hospital Problems    Diagnosis  POA    **Cellulitis of right lower extremity [L03.115]  Yes    Moderate protein-calorie malnutrition [E44.0]  Yes    Thrombocytopenia [D69.6]  Yes    Sick sinus syndrome [I49.5]  Yes    Chronic HFrEF (heart failure with reduced ejection fraction) [I50.22]  Yes    COPD (chronic obstructive pulmonary disease) [J44.9]  Yes    Bilateral carotid artery disease [I77.9]  Yes    Anticoagulated on Coumadin [Z79.01]  Not Applicable    CLL (chronic lymphocytic leukemia) [C91.10]  Yes    HTN (hypertension) [I10]  Yes    CKD (chronic kidney disease), stage III [N18.30]  Yes    Paroxysmal atrial fibrillation [I48.0]  Yes    Type 2 diabetes mellitus [E11.9]  Yes    HLD (hyperlipidemia) [E78.5]  Yes    Coronary artery disease [I25.10]  Yes      Resolved Hospital Problems   No resolved problems to display.     Patient is a 89 y.o. 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 Miller  - patient developed RLE cellulitis while on suppressive  keflex  - pt initially with borderline low Bps 70s/40s- she responded well to IVF, did not meet sepsis criteria (WBC elevation only)  -RLE doppler NEGATIVE for DVT  - Monitoring WBC, blood cx are NGTD  - Continue couple more days IV abx with po abx plan at dc (cefpodoxime and doxycycline)     Atrial fibrillation on warfarin  S/p pacemaker  HTN  HLD  - pt was hypotensive on arrival to ED so her coreg and loop diuretic were held, she responded to IV fluids- continue to hold BP meds until BP is more improved/stable-low normal currently  - cont statin  - pharmacy to dose warfarin  - add back home coreg 4/29     COPD/chronic hypoxic respiratory failure on home O2  Asthma exacerbation  -Pulmonology added IV steroids for exacerbation 05/01/25, switching to p.o. (increased WBC from steroids)  -CXR 5/1/2025 atelectasis/infiltrate lower lungs, minimal right pleural effusion  -Respiratory panel negative     CHARLENE on home bipap  - Patient reports that she is unable to bring her BiPAP machine in from home as she is worried it is going to be broken during transit which has happened previously  - Pulmonology following     CLL- has never required tx/follows with Dr. Larkin at [facility]  Hypogammaglobulinemia on monthly IVIG     Weakness  - acute on chronic  - Planning for SNF    DVT prophylaxis  Warfarin (home med)    Discharge  SNF maybe tomorrow ([facility])  Expected Discharge Date: 5/3/2025; Expected Discharge Time:     Discussed with patient, nursing staff, care team on multidisciplinary rounds, and CCP and Dr. Paul Khan MD  [facility]  05/02/25 10:12 EDT

## 2025-05-02 NOTE — MBS/VFSS/FEES
Acute Care - Speech Language Pathology   Swallow Initial Evaluation [facility]     Patient Name: Caitlyn Longoria  : 1934  MRN: [id number]  Today's Date: 2025               Admit Date: 2025    Visit Dx:     ICD-10-CM ICD-9-CM   1. Sepsis without acute organ dysfunction, due to unspecified organism  A41.9 038.9     995.91   2. Cellulitis of right lower extremity  L03.115 682.6   3. Leukocytosis, unspecified type  D72.829 288.60   4. Hypokalemia  E87.6 276.8   5. Pedal edema  R60.0 782.3     Patient Active Problem List   Diagnosis    Neck and shoulder pain    Arthropathy of shoulder region    Carpal tunnel syndrome of left wrist    Chronic pain of both shoulders    Chronic left shoulder pain    Arthropathy of left shoulder    Dysarthria    Type 2 diabetes mellitus    Paroxysmal atrial fibrillation    HLD (hyperlipidemia)    Chronic bronchitis    Coronary artery disease    CVA (cerebral vascular accident)    HTN (hypertension)    CKD (chronic kidney disease), stage III    Chronic combined systolic (congestive) and diastolic (congestive) heart failure    Infection of prosthetic right knee joint    Stasis dermatitis of right lower extremity due to peripheral venous hypertension    Current use of long term anticoagulation    Morbid obesity    Acute respiratory failure with hypoxia    Rhinovirus    Asthma with acute exacerbation    Acute respiratory failure with hypoxemia    Viral pneumonia    Hypoxia    CLL (chronic lymphocytic leukemia)    Dyspnea    Anticoagulated on Coumadin    Osteoporotic compression fracture of spine    Pneumonia due to gram-negative bacteria    Pneumonia due to infectious organism    Bilateral carotid artery disease    Hypogammaglobulinemia    Hypogammaglobulinemia    Cellulitis of right lower extremity    Hypokalemia    Nocturnal hypoxia    COPD (chronic obstructive pulmonary disease)    Upper respiratory tract infection    COPD (chronic obstructive pulmonary disease)    Sick  sinus syndrome    Anemia    Thrombocytopenia    Chronic HFrEF (heart failure with reduced ejection fraction)    Cellulitis of left lower leg    Acute exacerbation of COPD with asthma    Leukocytosis    COPD exacerbation    Moderate protein-calorie malnutrition     Past Medical History:   Diagnosis Date    Aortic calcification     mild, on echo 12/17/2017    Aortic regurgitation     Trace    Asthma     Atrial fibrillation     CAD (coronary artery disease)     Carpal tunnel syndrome of left wrist     Chronic combined systolic and diastolic congestive heart failure     CKD (chronic kidney disease) stage 3, GFR 30-59 ml/min     COPD (chronic obstructive pulmonary disease)     Coronary artery disease involving native coronary artery of native heart with angina pectoris     Disc degeneration, lumbar     Diverticulosis     DM type 2 (diabetes mellitus, type 2)     Dyslipidemia     GERD (gastroesophageal reflux disease)     History of aneurysm     right femoral artery s/p LHC    History of blood transfusion     History of fracture     History of heart attack     History of vitamin D deficiency     Hyperlipidemia     Hypertension     Leukemia     Mild mitral regurgitation     Mitral annular calcification     12/8/2017- echo, moderate    Osteopenia     PAF (paroxysmal atrial fibrillation)     Peripheral neuropathy     Skin cancer     Left hand    Sleep apnea     bipap    SSS (sick sinus syndrome)     Stroke (cerebrum)     TIA (transient ischemic attack) 2017    Tricuspid regurgitation     Trace     Past Surgical History:   Procedure Laterality Date    BRONCHOSCOPY Bilateral 10/6/2020    Procedure: BRONCHOSCOPY with BILATERAL LUNG washings;  Surgeon: Juno Coburn MD;  Location: [facility] ENDOSCOPY;  Service: Pulmonary;  Laterality: Bilateral;  PRE: purulent bronchitis  POST: PURULENT BRONCHITIS    BRONCHOSCOPY Bilateral 10/9/2020    Procedure: BRONCHOSCOPY with washing;  Surgeon: Juno Coburn MD;  Location: [facility]  ENDOSCOPY;  Service: Pulmonary;  Laterality: Bilateral;  pre/post - mucous plug      CARDIAC CATHETERIZATION      CARDIAC ELECTROPHYSIOLOGY PROCEDURE N/A 2/7/2020    Procedure: PPM generator change - dual  medtronic;  Surgeon: Kiel Field MD;  Location: [facility] CATH INVASIVE LOCATION;  Service: Cardiology;  Laterality: N/A;    CHOLECYSTECTOMY      CORONARY STENT PLACEMENT      ENDOSCOPY N/A 9/14/2022    Procedure: ESOPHAGOGASTRODUODENOSCOPY with 54fr main dilatation;  Surgeon: Enio Cota MD;  Location:  [facility] ENDOSCOPY;  Service: Gastroenterology;  Laterality: N/A;  pre - dysphagia  post - s/p dilatation, watermelon stomach    HERNIA REPAIR      hital hernia    HYSTERECTOMY      PACEMAKER IMPLANTATION      REPLACEMENT TOTAL KNEE Bilateral        SLP Recommendation and Plan  SLP Swallowing Diagnosis: mild, oral dysphagia, pharyngeal dysphagia (05/02/25 0900)  SLP Diet Recommendation: mechanical ground textures, no mixed consistencies, thin liquids (05/02/25 0900)  Recommended Precautions and Strategies: upright posture during/after eating, small bites of food and sips of liquid, multiple swallows per bite of food, multiple swallows per sip of liquid (05/02/25 0900)  SLP Rec. for Method of Medication Administration: meds whole, meds crushed, with puree (pt wishing to take meds whole with water sips per RN. SLP discussed aspiration risk) (05/02/25 0900)     Monitor for Signs of Aspiration: yes, notify SLP if any concerns (05/02/25 0900)  Recommended Diagnostics: reassess via clinical swallow evaluation, reassess via VFSS (MBS) (05/02/25 0900)  Swallow Criteria for Skilled Therapeutic Interventions Met: demonstrates skilled criteria (05/02/25 0900)     Rehab Potential/Prognosis, Swallowing: good, to achieve stated therapy goals (05/02/25 0900)  Therapy Frequency (Swallow): PRN (05/02/25 0900)  Predicted Duration Therapy Intervention (Days): until discharge (05/02/25 0900)  Oral Care Recommendations:  Oral Care BID/PRN (05/02/25 0900)                                        Outcome Evaluation: VFSS complete. Radiologist, Dr. Mcfarland, present during the study. Full report pending. Recommend mechanical ground diet with thin liquids, NO mixed consistencies. Meds whole or crushed in puree. Sitting upright, slow rate, small single bites/sips, double swallows, volitional throat clear.      SWALLOW EVALUATION (Last 72 Hours)       SLP Adult Swallow Evaluation       Row Name 05/02/25 0900 05/01/25 1500                Rehab Evaluation    Document Type evaluation  -CR evaluation  -NICK       Subjective Information no complaints  -CR --  reports globus sensation post-swallow on solids/liquids  -NICK       Patient Observations alert;cooperative  -CR alert;cooperative  -NICK       Patient Effort good  -CR good  -NICK       Symptoms Noted During/After Treatment other (see comments)  increase in labored breathing  -CR other (see comments)  rapid breathing and wheezing after swallow  -NICK          General Information    Patient Profile Reviewed yes  -CR yes  -NICK       Pertinent History Of Current Problem -- RLE cellulitis. ? Hx of recurrent pneumonia.  -NICK       Current Method of Nutrition -- regular textures;thin liquids  -NICK       Precautions/Limitations, Vision -- WFL;for purposes of eval  -NICK       Precautions/Limitations, Hearing -- WFL;for purposes of eval  -NICK       Prior Level of Function-Swallowing -- no diet consistency restrictions  -NICK       Plans/Goals Discussed with -- patient;agreed upon  -NICK       Barriers to Rehab -- none identified  -NICK          Pain    Pretreatment Pain Rating 0/10 - no pain  -CR 0/10 - no pain  -NICK       Posttreatment Pain Rating 0/10 - no pain  -CR 0/10 - no pain  -NICK          Oral Motor Structure and Function    Dentition Assessment natural, present and adequate  -CR --       Secretion Management WNL/WFL  -CR WNL/WFL  -NICK       Mucosal Quality moist, healthy  -CR moist, healthy  -NICK       Volitional  Cough -- non-productive  Pt reports difficulty ceasing cough once started  -NICK          Oral Musculature and Cranial Nerve Assessment    Oral Motor General Assessment -- WFL  -NICK          General Eating/Swallowing Observations    Respiratory Support Currently in Use -- room air  -NICK       Eating/Swallowing Skills -- self-fed;fed by SLP  -NICK       Positioning During Eating -- upright 90 degree  -NICK       Utensils Used -- spoon;straw  -NICK       Consistencies Trialed -- thin liquids;pureed  -NICK          Clinical Swallow Eval    Oral Prep Phase -- WFL  -NICK       Oral Transit -- WFL  -NICK       Oral Residue -- WFL  -NICK       Pharyngeal Phase -- suspected pharyngeal impairment  -NICK       Clinical Swallow Evaluation Summary -- Pt presented with overt s/s of aspiration on puree and thin liquids with rapid breathing, wheezing, and wet cough. Rec f/u VFSS. NPO with free water protocol.  -NICK          Pharyngeal Phase Concerns    Pharyngeal Phase Concerns -- --  -NICK       Cough -- --  -NICK          MBS/VFSS Interpretation    VFSS Summary VFSS complete. Radiologist, Dr. Mcfarland, present during the study. Patient presents with mild oropharyngeal dysphagia characterized by swallow mistiming, reduced laryngeal vestibule closure, and reduced hyolaryngeal excursion. Breathing pattern impacts coordination of swallow. Swallow elicited at the pyriforms with thins. Deep transient penetration during the swallow with thins cup/straw. Double swallow reduced mild valleculae residue to trace. Swallow triggered at the valleculae with nectar thick liquid via cup, elicited at the pyriforms with nectar straw. No penetration observed. Mild base of tongue and valleculae residue of puree reduced with thin liquid wash by cup/straw, deep transient penetration observed during the swallow. Trace silent non transient penetration before the swallow/during mastication of thin portion of mixed consistency. Cued double swallow reduced mild diffuse residue.  Recommend mechanical ground diet with thin liquids, NO mixed consistencies. Meds whole or crushed in puree. Sitting upright, slow rate, small single bites/sips, double swallows, volitional throat clear. With negative lung changes, downgrade to nectar thick liquids.  -CR --          SLP Communication to Radiology    Summary Statement VFSS complete. Radiologist, Dr. Mcfarland, present during the study. Patient presents with mild oropharyngeal dysphagia. Deep transient penetration during the swallow with thins cup/straw. Double swallow reduced mild valleculae residue of liquids. No penetration with nectar thick liquid via cup/straw or puree trials. Mild base of tongue and valleculae residue of puree reduced with thin liquid wash by cup/straw, deep transient penetration observed during the swallow. Trace non transient penetration before the swallow/during mastication of thin portion of mixed consistency. Cued double swallow reduced mild diffuse residue.  -CR --          SLP Evaluation Clinical Impression    SLP Swallowing Diagnosis mild;oral dysphagia;pharyngeal dysphagia  -CR R/O pharyngeal dysphagia;suspected pharyngeal dysphagia  -NICK       Functional Impact risk of aspiration/pneumonia  -CR risk of aspiration/pneumonia  -NICK       Rehab Potential/Prognosis, Swallowing good, to achieve stated therapy goals  -CR good, to achieve stated therapy goals  -NICK       Swallow Criteria for Skilled Therapeutic Interventions Met demonstrates skilled criteria  -CR demonstrates skilled criteria  -NICK          Recommendations    Therapy Frequency (Swallow) PRN  -CR PRN  -NICK       Predicted Duration Therapy Intervention (Days) until discharge  -CR until discharge  -NICK       SLP Diet Recommendation mechanical ground textures;no mixed consistencies;thin liquids  -CR other (see comments)  Free water protocol  -NICK       Recommended Diagnostics reassess via clinical swallow evaluation;reassess via VFSS (MBS)  -CR VFSS ([facility])  -NICK        Recommended Precautions and Strategies upright posture during/after eating;small bites of food and sips of liquid;multiple swallows per bite of food;multiple swallows per sip of liquid  -CR upright posture during/after eating;small bites of food and sips of liquid  -NICK       Oral Care Recommendations Oral Care BID/PRN  -CR Oral Care BID/PRN;Before ice/water  -NICK       SLP Rec. for Method of Medication Administration meds whole;meds crushed;with puree  pt wishing to take meds whole with water sips per RN. SLP discussed aspiration risk  -CR meds whole;with thin liquids  -NICK       Monitor for Signs of Aspiration yes;notify SLP if any concerns  -CR yes;notify SLP if any concerns  -NICK          Swallow Goals (SLP)    Swallow LTGs Patient will demonstrate functional swallow for  -CR --          (LTG) Patient will demonstrate functional swallow for    Diet Texture (Demonstrate functional swallow) soft to chew (ground) textures  -CR --       Liquid viscosity (Demonstrate functional swallow) thin liquids  -CR --       [place] (Demonstrate functional swallow) with minimal cues (75-90% accuracy)  -CR --       Time Frame (Demonstrate functional swallow) by discharge  -CR --       Progress/Outcomes (Demonstrate functional swallow) new goal  -CR --                 User Key  (r) = Recorded By, (t) = Taken By, (c) = Cosigned By      Initials Name Effective Dates    Yissel Alfaro, SLP 12/03/24 -     Jaylin Peres, ORION 03/05/25 -                     EDUCATION  The patient has been educated in the following areas:   Dysphagia (Swallowing Impairment).        SLP GOALS       Row Name 05/02/25 0900             (LTG) Patient will demonstrate functional swallow for    Diet Texture (Demonstrate functional swallow) soft to chew (ground) textures  -CR      Liquid viscosity (Demonstrate functional swallow) thin liquids  -CR      [place] (Demonstrate functional swallow) with minimal cues (75-90% accuracy)  -CR      Time  Frame (Demonstrate functional swallow) by discharge  -CR      Progress/Outcomes (Demonstrate functional swallow) new goal  -CR                User Key  (r) = Recorded By, (t) = Taken By, (c) = Cosigned By      Initials Name Provider Type    Yissel Alfaro SLP Speech and Language Pathologist                         Time Calculation:    Time Calculation- SLP       Row Name 05/02/25 1249             Time Calculation- SLP    SLP Start Time 0730  -CR      SLP Received On 05/02/25  -CR         Untimed Charges    27178-HL Motion Fluoro Eval Swallow Minutes 75  -CR         Total Minutes    Untimed Charges Total Minutes 75  -CR       Total Minutes 75  -CR                User Key  (r) = Recorded By, (t) = Taken By, (c) = Cosigned By      Initials Name Provider Type    Yissel Alfaro SLP Speech and Language Pathologist                    Therapy Charges for Today       Code Description Service Date Service Provider Modifiers Qty    [id number]  ST MOTION FLUORO EVAL SWALLOW 5 5/2/2025 Yissel Boyle SLP GN 1                 ORION Love  5/2/2025

## 2025-05-02 NOTE — PROGRESS NOTES
[facility] Services: Clinical Pharmacy Consult - Warfarin Dosing/Monitoring    Results from last 7 days   Lab Units 05/02/25  0503 05/01/25  0525 04/30/25  0350 04/29/25  0259 04/28/25  0438   INR  3.18* 2.41* 2.51* 2.18* 2.06*     Indication: A Fib - requiring full anticoagulation  Target INR: 2 - 3    Current Dose: 2 mg daily (has received this dose the past 5 days)  Comments:   INR jump overnight  DDI with ceftriaxone and ID has not finalized their plan at this time so will continue to monitor INR daily.  Doxycycline added this am after labs drawn so not a factor in INR increase    Plan:  Will hold warfarin dose today.  RN to Monitor for any signs or symptoms of bleeding  Pharmacy services to follow up daily INRs and dose adjustments    Pharmacy will continue to follow until discharge or discontinuation of warfarin.      Daniela Hudson, Pharm.D., [facility]  Clinical Pharmacist

## 2025-05-02 NOTE — THERAPY TREATMENT NOTE
Patient Name: Caitlyn Longoria  : 1934    MRN: [id number]                              Today's Date: 2025       Admit Date: 2025    Visit Dx:     ICD-10-CM ICD-9-CM   1. Sepsis without acute organ dysfunction, due to unspecified organism  A41.9 038.9     995.91   2. Cellulitis of right lower extremity  L03.115 682.6   3. Leukocytosis, unspecified type  D72.829 288.60   4. Hypokalemia  E87.6 276.8   5. Pedal edema  R60.0 782.3     Patient Active Problem List   Diagnosis    Neck and shoulder pain    Arthropathy of shoulder region    Carpal tunnel syndrome of left wrist    Chronic pain of both shoulders    Chronic left shoulder pain    Arthropathy of left shoulder    Dysarthria    Type 2 diabetes mellitus    Paroxysmal atrial fibrillation    HLD (hyperlipidemia)    Chronic bronchitis    Coronary artery disease    CVA (cerebral vascular accident)    HTN (hypertension)    CKD (chronic kidney disease), stage III    Chronic combined systolic (congestive) and diastolic (congestive) heart failure    Infection of prosthetic right knee joint    Stasis dermatitis of right lower extremity due to peripheral venous hypertension    Current use of long term anticoagulation    Morbid obesity    Acute respiratory failure with hypoxia    Rhinovirus    Asthma with acute exacerbation    Acute respiratory failure with hypoxemia    Viral pneumonia    Hypoxia    CLL (chronic lymphocytic leukemia)    Dyspnea    Anticoagulated on Coumadin    Osteoporotic compression fracture of spine    Pneumonia due to gram-negative bacteria    Pneumonia due to infectious organism    Bilateral carotid artery disease    Hypogammaglobulinemia    Hypogammaglobulinemia    Cellulitis of right lower extremity    Hypokalemia    Nocturnal hypoxia    COPD (chronic obstructive pulmonary disease)    Upper respiratory tract infection    COPD (chronic obstructive pulmonary disease)    Sick sinus syndrome    Anemia    Thrombocytopenia    Chronic HFrEF  (heart failure with reduced ejection fraction)    Cellulitis of left lower leg    Acute exacerbation of COPD with asthma    Leukocytosis    COPD exacerbation    Moderate protein-calorie malnutrition     Past Medical History:   Diagnosis Date    Aortic calcification     mild, on echo 12/17/2017    Aortic regurgitation     Trace    Asthma     Atrial fibrillation     CAD (coronary artery disease)     Carpal tunnel syndrome of left wrist     Chronic combined systolic and diastolic congestive heart failure     CKD (chronic kidney disease) stage 3, GFR 30-59 ml/min     COPD (chronic obstructive pulmonary disease)     Coronary artery disease involving native coronary artery of native heart with angina pectoris     Disc degeneration, lumbar     Diverticulosis     DM type 2 (diabetes mellitus, type 2)     Dyslipidemia     GERD (gastroesophageal reflux disease)     History of aneurysm     right femoral artery s/p LHC    History of blood transfusion     History of fracture     History of heart attack     History of vitamin D deficiency     Hyperlipidemia     Hypertension     Leukemia     Mild mitral regurgitation     Mitral annular calcification     12/8/2017- echo, moderate    Osteopenia     PAF (paroxysmal atrial fibrillation)     Peripheral neuropathy     Skin cancer     Left hand    Sleep apnea     bipap    SSS (sick sinus syndrome)     Stroke (cerebrum)     TIA (transient ischemic attack) 2017    Tricuspid regurgitation     Trace     Past Surgical History:   Procedure Laterality Date    BRONCHOSCOPY Bilateral 10/6/2020    Procedure: BRONCHOSCOPY with BILATERAL LUNG washings;  Surgeon: Juno Coburn MD;  Location: [facility] ENDOSCOPY;  Service: Pulmonary;  Laterality: Bilateral;  PRE: purulent bronchitis  POST: PURULENT BRONCHITIS    BRONCHOSCOPY Bilateral 10/9/2020    Procedure: BRONCHOSCOPY with washing;  Surgeon: Juno Coburn MD;  Location: [facility] ENDOSCOPY;  Service: Pulmonary;  Laterality: Bilateral;  pre/post -  mucous plug      CARDIAC CATHETERIZATION      CARDIAC ELECTROPHYSIOLOGY PROCEDURE N/A 2/7/2020    Procedure: PPM generator change - dual  medtronic;  Surgeon: Kiel Field MD;  Location: [facility] CATH INVASIVE LOCATION;  Service: Cardiology;  Laterality: N/A;    CHOLECYSTECTOMY      CORONARY STENT PLACEMENT      ENDOSCOPY N/A 9/14/2022    Procedure: ESOPHAGOGASTRODUODENOSCOPY with 54fr main dilatation;  Surgeon: Enio Cota MD;  Location: [facility] ENDOSCOPY;  Service: Gastroenterology;  Laterality: N/A;  pre - dysphagia  post - s/p dilatation, watermelon stomach    HERNIA REPAIR      hital hernia    HYSTERECTOMY      PACEMAKER IMPLANTATION      REPLACEMENT TOTAL KNEE Bilateral       General Information       Row Name 05/02/25 1100          Physical Therapy Time and Intention    Document Type therapy note (daily note)  -PH     Mode of Treatment physical therapy  -       Row Name 05/02/25 1100          General Information    Existing Precautions/Restrictions fall  -PH     Barriers to Rehab medically complex;previous functional deficit  -       Row Name 05/02/25 1100          Cognition    Orientation Status (Cognition) oriented x 3  -PH       Row Name 05/02/25 1100          Safety Issues/Impairments Affecting Functional Mobility    Safety Issues Affecting Function (Mobility) insight into deficits/self-awareness;ability to follow commands;sequencing abilities  -PH     Impairments Affecting Function (Mobility) balance;endurance/activity tolerance;strength;shortness of breath  -PH     Comment, Safety Issues/Impairments (Mobility) gt belt and non skid socks donned  -PH               User Key  (r) = Recorded By, (t) = Taken By, (c) = Cosigned By      Initials Name Provider Type    PH Lisy Bautista PTA Physical Therapist Assistant                   Mobility       Row Name 05/02/25 1100          Bed Mobility    Bed Mobility supine-sit  -PH     Supine-Sit Brown (Bed Mobility) standby assist   -PH     Assistive Device (Bed Mobility) bed rails;head of bed elevated  -PH     Comment, (Bed Mobility) incr time to L EOB; pt took seated rest of approx 3 min EOB w/ SOA noted  -PH       Row Name 05/02/25 1100          Bed-Chair Transfer    Bed-Chair [place] (Transfers) minimum assist (75% patient effort);verbal cues;nonverbal cues (demo/gesture)  -PH     Assistive Device (Bed-Chair Transfers) walker, front-wheeled  -PH     Comment, (Bed-Chair Transfer) 4-5 small steps to chair; SOA noted  -PH       Row Name 05/02/25 1100          Sit-Stand Transfer    Sit-Stand [place] (Transfers) minimum assist (75% patient effort);nonverbal cues (demo/gesture)  -PH     Assistive Device (Sit-Stand Transfers) walker, front-wheeled  -PH       Row Name 05/02/25 1100          Gait/Stairs (Locomotion)    Distance in Feet (Gait) 1  few steps to chair  -PH     Deviations/Abnormal Patterns (Gait) jeremi decreased;gait speed decreased;stride length decreased  -PH     Bilateral Gait Deviations forward flexed posture;heel strike decreased  -PH     Comment, (Gait/Stairs) slow and unsteady w/ no overt LOB; SOA noted w/ activity intolerance limiting  -PH               User Key  (r) = Recorded By, (t) = Taken By, (c) = Cosigned By      Initials Name Provider Type     Lisy Bautista PTA Physical Therapist Assistant                   Obj/Interventions       Row Name 05/02/25 1102          Motor Skills    Therapeutic Exercise other (see comments)  BAP, LAQ; x 10 reps  -PH       Row Name 05/02/25 1102          Balance    Balance Assessment sitting static balance;standing static balance  -PH     Static Sitting Balance standby assist  -PH     Static Standing Balance minimal assist;verbal cues  -PH     Position/Device Used, Standing Balance walker, front-wheeled  -PH               User Key  (r) = Recorded By, (t) = Taken By, (c) = Cosigned By      Initials Name Provider Type     Lisy Bautista PTA Physical Therapist  Assistant                   Goals/Plan    No documentation.                  Clinical Impression       Row Name 05/02/25 1103          Pain    Pretreatment Pain Rating 0/10 - no pain  -PH     Posttreatment Pain Rating 0/10 - no pain  -PH       Row Name 05/02/25 1103          Plan of Care Review    Plan of Care Reviewed With patient  -PH     Progress no change  -PH     Outcome Evaluation Pt was seen for Pt tx this AM. Pt was in bed w/ many questions about diet. Notified RN later about questions and requests. Pt sat up to EOB w/ SBa and incr time. Pt stood w/ min A + fww. Posterior lean noted w/ pt reporting she has her own method for standing in spite of cues provided. Pt amb approx 1' to chair w/ small, slow steps req min A x 1 + fww. No overt LOB occurred. Pt fatigued quickly w/ small activity throughout session w/ SOA noted and req rests of approx 2 min after each activity. Rec SNF after dc. Cup of water was brought to pt w/ RN verbally providing okay.  -PH       Row Name 05/02/25 1103          Vital Signs    O2 Delivery Pre Treatment room air  -PH     O2 Delivery Intra Treatment room air  -PH     O2 Delivery Post Treatment room air  -PH       Row Name 05/02/25 1103          Positioning and Restraints    Pre-Treatment Position in bed  -PH     Post Treatment Position chair  -PH     In Chair notified nsg;reclined;call light within reach;encouraged to call for assist;exit alarm on  -PH               User Key  (r) = Recorded By, (t) = Taken By, (c) = Cosigned By      Initials Name Provider Type    PH Lisy Bautista PTA Physical Therapist Assistant                   Outcome Measures       Row Name 05/02/25 1106 05/02/25 0932       How much help from another person do you currently need...    Turning from your back to your side while in flat bed without using bedrails? 3  -PH 3  -JH    Moving from lying on back to sitting on the side of a flat bed without bedrails? 3  -PH 2  -JH    Moving to and from a bed to a  chair (including a wheelchair)? 3  -PH 2  -JH    Standing up from a chair using your arms (e.g., wheelchair, bedside chair)? 3  -PH 2  -JH    Climbing 3-5 steps with a railing? 1  -PH 1  -    To walk in hospital room? 2  -PH 2  -JH    AM-PAC 6 Clicks Score (PT) 15  -PH 12  -    Highest Level of Mobility Goal 4 --> Transfer to chair/commode  - 4 --> Transfer to chair/commode  -      Row Name 05/02/25 1106          Functional Assessment    Outcome Measure Options AM-PAC 6 Clicks Basic Mobility (PT)  -               User Key  (r) = Recorded By, (t) = Taken By, (c) = Cosigned By      Initials Name Provider Type    Pricilla Magana RN Registered Nurse    Lisy Mayfield PTA Physical Therapist Assistant                                 Physical Therapy Education       Title: PT OT SLP Therapies (Done)       Topic: Physical Therapy (Done)       Point: Mobility training (Done)       Learning Progress Summary            Patient Acceptance, E,TB,D, VU,NR by  at 5/2/2025 1107    Acceptance, E, VU by NICK at 5/1/2025 1548    Acceptance, E, NR by AR at 4/30/2025 1558    Acceptance, E, VU,NR by  at 4/28/2025 1317    Acceptance, E, VU,NR by  at 4/26/2025 0956                      Point: Home exercise program (Done)       Learning Progress Summary            Patient Acceptance, E,TB,D, VU,NR by  at 5/2/2025 1107    Acceptance, E, VU by NICK at 5/1/2025 1548    Acceptance, E, NR by AR at 4/30/2025 1558    Acceptance, E, VU,NR by  at 4/28/2025 1317    Acceptance, E, VU,NR by  at 4/26/2025 0956                      Point: Body mechanics (Done)       Learning Progress Summary            Patient Acceptance, E,TB,D, VU,NR by  at 5/2/2025 1107    Acceptance, E, VU by NICK at 5/1/2025 1548    Acceptance, E, NR by AR at 4/30/2025 1558    Acceptance, E, VU,NR by  at 4/28/2025 1317    Acceptance, E, VU,NR by  at 4/26/2025 0956                      Point: Precautions (Done)       Learning Progress Summary             Patient Acceptance, E,TB,D, VU,NR by  at 5/2/2025 1107    Acceptance, E, VU by NICK at 5/1/2025 1548    Acceptance, E, NR by AR at 4/30/2025 1558    Acceptance, E, VU,NR by  at 4/28/2025 1317    Acceptance, E, VU,NR by  at 4/26/2025 0956                                      User Key       Initials Effective Dates Name Provider Type Discipline    AR 06/16/21 -  Leonor Shabazz, PT Physical Therapist PT     06/16/21 -  Lisy Bautista PTA Physical Therapist Assistant PT     05/02/22 -  Evelyn Murillo PT Physical Therapist PT    NICK 03/05/25 -  Jaylin Meza, SLP Speech and Language Pathologist SLP                  PT Recommendation and Plan     Progress: no change  Outcome Evaluation: Pt was seen for Pt tx this AM. Pt was in bed w/ many questions about diet. Notified RN later about questions and requests. Pt sat up to EOB w/ SBa and incr time. Pt stood w/ min A + fww. Posterior lean noted w/ pt reporting she has her own method for standing in spite of cues provided. Pt amb approx 1' to chair w/ small, slow steps req min A x 1 + fww. No overt LOB occurred. Pt fatigued quickly w/ small activity throughout session w/ SOA noted and req rests of approx 2 min after each activity. Rec SNF after dc. Cup of water was brought to pt w/ RN verbally providing okay.     Time Calculation:         PT Charges       Row Name 05/02/25 1107             Time Calculation    Start Time 1034  -PH      Stop Time 1057  -PH      Time Calculation (min) 23 min  -PH      PT Received On 05/02/25  -PH      PT - Next Appointment 05/05/25  -PH         Timed Charges    51212 - PT Therapeutic Exercise Minutes 3  -PH      67344 - PT Therapeutic Activity Minutes 20  -PH         Total Minutes    Timed Charges Total Minutes 23  -PH       Total Minutes 23  -PH                User Key  (r) = Recorded By, (t) = Taken By, (c) = Cosigned By      Initials Name Provider Type    PH iLsy Bautista PTA Physical Therapist  Assistant                  Therapy Charges for Today       Code Description Service Date Service Provider Modifiers Qty    [id number]  PT THERAPEUTIC ACT EA 15 MIN 5/2/2025 Lisy Bautista, WILFREDO GP 2            PT G-Codes  Outcome Measure Options: AM-PAC 6 Clicks Basic Mobility (PT)  AM-PAC 6 Clicks Score (PT): 15  AM-PAC 6 Clicks Score (OT): 13  PT Discharge Summary  Anticipated Discharge Disposition (PT): skilled nursing facility    Lisy Bautista PTA  5/2/2025

## 2025-05-02 NOTE — PLAN OF CARE
Problem: Adult Inpatient Plan of Care  Goal: Plan of Care Review  Outcome: Progressing  Flowsheets (Taken 5/2/2025 0176)  Progress: no change  Outcome Evaluation: Vitals stable, NPO for now until pt have video swallow done, sips with meds, pt on BiPAP when sleeping for a couple of hours, then refuse to have it on, RN put on 2L oxygen, Q2hr turn, IV solumedrol continued, will switch into PO steroid today, dressing changed on coccyx and right lower leg, dressing applied to melody heels, plan of care on going.  Plan of Care Reviewed With: patient

## 2025-05-02 NOTE — PROGRESS NOTES
[facility] INPATIENT PROGRESS NOTE         [address]    2025      PATIENT IDENTIFICATION:  Name: Caitlyn Longoria ADMIT: 2025   : 1934  PCP: Zenaida Suarez MD    MRN: [id number] LOS: 7 days   AGE/SEX: 89 y.o. female  ROOM: [facility]                     LOS 7    Reason for visit: CHARLENE      SUBJECTIVE:      Resting comfortably.  Says that she slept better last night.  Her cough and wheezing has improved.      Objective   OBJECTIVE:    Vital Sign Min/Max for last 24 hours  Temp  Min: 97.5 °F (36.4 °C)  Max: 98.1 °F (36.7 °C)   BP  Min: 88/68  Max: 140/78   Pulse  Min: 80  Max: 83   Resp  Min: 18  Max: 18   SpO2  Min: 94 %  Max: 100 %   No data recorded   Weight  Min: 81.4 kg (179 lb 6.4 oz)  Max: 81.4 kg (179 lb 6.4 oz)    Vitals:    25 0640 25 0703 25 0708 25 0726   BP:    109/67   BP Location:    Right arm   Patient Position:    Lying   Pulse:  80 80 80   Resp:  18  18   Temp:    97.5 °F (36.4 °C)   TempSrc:    Oral   SpO2:  99% 100% 100%   Weight: 81.4 kg (179 lb 6.4 oz)      Height:                25  0617 25  0500 25  0640   Weight: 79.5 kg (175 lb 3.2 oz) 82.1 kg (180 lb 14.4 oz) 81.4 kg (179 lb 6.4 oz)       Body mass index is 31.79 kg/m².                          Body mass index is 31.79 kg/m².    Intake/Output Summary (Last 24 hours) at 2025 0759  Last data filed at 2025 1800  Gross per 24 hour   Intake --   Output 325 ml   Net -325 ml         Exam:  GEN:  No distress, appears stated age  EYES:   PERRL, anicteric sclerae  ENT:    External ears/nose normal, OP clear  NECK:  No adenopathy, midline trachea  LUNGS: Normal chest on inspection, palpation and coarse wheezing noted on auscultation  CV:  Normal S1S2, without murmur  ABD:  Nontender, nondistended, no hepatosplenomegaly, +BS  EXT:  No edema.  No cyanosis or clubbing.  No mottling and normal cap refill.    Assessment     Scheduled meds:  budesonide, 0.5 mg,  Nebulization, BID - RT  calcium 500 mg vitamin D 5 mcg (200 UT), 1 tablet, Oral, BID  cefTRIAXone, 2,000 mg, Intravenous, Q24H  cetirizine, 10 mg, Oral, BID  cholecalciferol, 1,000 Units, Oral, Daily  doxycycline, 100 mg, Oral, Q12H  fluticasone, 1 spray, Nasal, Daily  guaiFENesin, 1,200 mg, Oral, Q12H  ipratropium-albuterol, 3 mL, Nebulization, Q6H While Awake - RT  Menthol-Zinc Oxide, 1 Application, Topical, Q12H  miconazole, 1 Application, Topical, Q12H  montelukast, 10 mg, Oral, Nightly  oxybutynin XL, 10 mg, Oral, BID  predniSONE, 40 mg, Oral, Daily With Breakfast  rosuvastatin, 20 mg, Oral, Nightly  sodium chloride, 4 mL, Nebulization, BID - RT  warfarin, 2 mg, Oral, Daily      IV meds:                      Pharmacy to dose warfarin,       Data Review:  Results from last 7 days   Lab Units 05/02/25  0503 05/01/25  0526 04/30/25  1522 04/30/25 0349 04/29/25 0259 04/28/25  0438   SODIUM mmol/L 138 138  --  139 136 141   POTASSIUM mmol/L 4.8 3.9 4.4 3.6 3.8 4.1   CHLORIDE mmol/L 102 104  --  104 104 109*   CO2 mmol/L 23.0 23.0  --  25.2 22.8 23.0   BUN mg/dL 14 10  --  12 11 11   CREATININE mg/dL 0.89 0.69  --  0.77 0.73 0.68   GLUCOSE mg/dL 253* 164*  --  169* 144* 125*   CALCIUM mg/dL 8.8 8.2  --  8.7 8.4 8.2         Estimated Creatinine Clearance: 42.4 mL/min (by C-G formula based on SCr of 0.89 mg/dL).  Results from last 7 days   Lab Units 05/02/25  0503 05/01/25  0526 04/30/25  0349 04/29/25  0259 04/28/25  0438   WBC 10*3/mm3 23.19* 15.18* 15.54* 14.35* 16.11*   HEMOGLOBIN g/dL 11.1* 10.2* 10.5* 10.3* 10.6*   PLATELETS 10*3/mm3 162 119* 113* 108* 108*     Results from last 7 days   Lab Units 05/02/25  0503 05/01/25  0525 04/30/25  0350 04/29/25  0259 04/28/25  0438   INR  3.18* 2.41* 2.51* 2.18* 2.06*     Results from last 7 days   Lab Units 05/02/25  0503 04/27/25  0336 04/25/25  1237   ALT (SGPT) U/L 57* 56* 45*   AST (SGOT) U/L 26 89* 23         Results from last 7 days   Lab Units 04/25/25  1550  "04/25/25  1237   PROCALCITONIN ng/mL  --  0.27*   LACTATE mmol/L 1.2 2.1*         No results found for: \"HGBA1C\", \"POCGLU\"        Imaging reviewed  Chest x-ray 5/1 reviewed: No focal consolidation.  Cardiomegaly noted.  Mild atelectasis bases.  Tiny right pleural effusion.    Lower extremity Doppler 4/26 reviewed: Normal right lower extremity    Microbiology reviewed  NGTD          Active Hospital Problems    Diagnosis  POA    **Cellulitis of right lower extremity [L03.115]  Yes    Moderate protein-calorie malnutrition [E44.0]  Yes    Thrombocytopenia [D69.6]  Yes    Sick sinus syndrome [I49.5]  Yes    Chronic HFrEF (heart failure with reduced ejection fraction) [I50.22]  Yes    COPD (chronic obstructive pulmonary disease) [J44.9]  Yes    Bilateral carotid artery disease [I77.9]  Yes    Anticoagulated on Coumadin [Z79.01]  Not Applicable    CLL (chronic lymphocytic leukemia) [C91.10]  Yes    HTN (hypertension) [I10]  Yes    CKD (chronic kidney disease), stage III [N18.30]  Yes    Paroxysmal atrial fibrillation [I48.0]  Yes    Type 2 diabetes mellitus [E11.9]  Yes    HLD (hyperlipidemia) [E78.5]  Yes    Coronary artery disease [I25.10]  Yes      Resolved Hospital Problems   No resolved problems to display.         ASSESSMENT:  CHARLENE  Severe persistent asthma with acute exacerbation  Hypogammaglobulinemia  Cellulitis right lower extremity      PLAN:  Encourage positive airway pressure with all sleep.  On BiPAP 14/11 with 2 L of oxygen bled in at home.  Bronchodilators as needed for asthma symptoms.  Improvement in cough and wheezing with additional steroids.  Switching to p.o. taper.  Antibiotics for cellulitis per admitting service.      Santiago Romero MD  Pulmonary and Critical Care Medicine  [facility], [facility]  5/2/2025    07:59 EDT     "

## 2025-05-03 LAB
ANION GAP SERPL CALCULATED.3IONS-SCNC: 9.3 MMOL/L (ref 5–15)
BUN SERPL-MCNC: 22 MG/DL (ref 8–23)
BUN/CREAT SERPL: 29.3 (ref 7–25)
CALCIUM SPEC-SCNC: 8.6 MG/DL (ref 8.2–9.6)
CHLORIDE SERPL-SCNC: 101 MMOL/L (ref 98–107)
CO2 SERPL-SCNC: 26.7 MMOL/L (ref 22–29)
CREAT SERPL-MCNC: 0.75 MG/DL (ref 0.57–1)
DEPRECATED RDW RBC AUTO: 50 FL (ref 37–54)
EGFRCR SERPLBLD CKD-EPI 2021: 75.7 ML/MIN/1.73
ERYTHROCYTE [DISTWIDTH] IN BLOOD BY AUTOMATED COUNT: 13.2 % (ref 12.3–15.4)
GLUCOSE SERPL-MCNC: 283 MG/DL (ref 65–99)
HCT VFR BLD AUTO: 35.2 % (ref 34–46.6)
HGB BLD-MCNC: 10.4 G/DL (ref 12–15.9)
INR PPP: 3.64 (ref 0.9–1.1)
MCH RBC QN AUTO: 30.3 PG (ref 26.6–33)
MCHC RBC AUTO-ENTMCNC: 29.5 G/DL (ref 31.5–35.7)
MCV RBC AUTO: 102.6 FL (ref 79–97)
PLATELET # BLD AUTO: 174 10*3/MM3 (ref 140–450)
PMV BLD AUTO: 10.4 FL (ref 6–12)
POTASSIUM SERPL-SCNC: 3.9 MMOL/L (ref 3.5–5.2)
PROTHROMBIN TIME: 36.8 SECONDS (ref 11.7–14.2)
RBC # BLD AUTO: 3.43 10*6/MM3 (ref 3.77–5.28)
SODIUM SERPL-SCNC: 137 MMOL/L (ref 136–145)
WBC NRBC COR # BLD AUTO: 26.34 10*3/MM3 (ref 3.4–10.8)

## 2025-05-03 PROCEDURE — 94761 N-INVAS EAR/PLS OXIMETRY MLT: CPT

## 2025-05-03 PROCEDURE — 94664 DEMO&/EVAL PT USE INHALER: CPT

## 2025-05-03 PROCEDURE — 63710000001 PREDNISONE PER 1 MG: Performed by: INTERNAL MEDICINE

## 2025-05-03 PROCEDURE — 94799 UNLISTED PULMONARY SVC/PX: CPT

## 2025-05-03 PROCEDURE — 94669 MECHANICAL CHEST WALL OSCILL: CPT

## 2025-05-03 PROCEDURE — 85610 PROTHROMBIN TIME: CPT | Performed by: STUDENT IN AN ORGANIZED HEALTH CARE EDUCATION/TRAINING PROGRAM

## 2025-05-03 PROCEDURE — 85027 COMPLETE CBC AUTOMATED: CPT | Performed by: HOSPITALIST

## 2025-05-03 PROCEDURE — 94760 N-INVAS EAR/PLS OXIMETRY 1: CPT

## 2025-05-03 PROCEDURE — 80048 BASIC METABOLIC PNL TOTAL CA: CPT | Performed by: STUDENT IN AN ORGANIZED HEALTH CARE EDUCATION/TRAINING PROGRAM

## 2025-05-03 PROCEDURE — 25010000002 CEFTRIAXONE PER 250 MG: Performed by: INTERNAL MEDICINE

## 2025-05-03 RX ORDER — CARVEDILOL 3.12 MG/1
3.12 TABLET ORAL 2 TIMES DAILY WITH MEALS
Status: DISCONTINUED | OUTPATIENT
Start: 2025-05-03 | End: 2025-05-05 | Stop reason: HOSPADM

## 2025-05-03 RX ADMIN — CARVEDILOL 3.12 MG: 3.12 TABLET, FILM COATED ORAL at 21:54

## 2025-05-03 RX ADMIN — FLUTICASONE PROPIONATE 1 SPRAY: 50 SPRAY, METERED NASAL at 09:09

## 2025-05-03 RX ADMIN — OXYBUTYNIN CHLORIDE 10 MG: 10 TABLET, EXTENDED RELEASE ORAL at 09:07

## 2025-05-03 RX ADMIN — GUAIFENESIN 1200 MG: 600 TABLET, EXTENDED RELEASE ORAL at 09:07

## 2025-05-03 RX ADMIN — BUDESONIDE 0.5 MG: 0.5 INHALANT RESPIRATORY (INHALATION) at 19:01

## 2025-05-03 RX ADMIN — IPRATROPIUM BROMIDE AND ALBUTEROL SULFATE 3 ML: .5; 3 SOLUTION RESPIRATORY (INHALATION) at 13:41

## 2025-05-03 RX ADMIN — CALCIUM CARBONATE-VITAMIN D TAB 500 MG-200 UNIT 1 TABLET: 500-200 TAB at 21:49

## 2025-05-03 RX ADMIN — ANTI-FUNGAL POWDER MICONAZOLE NITRATE TALC FREE 1 APPLICATION: 1.42 POWDER TOPICAL at 09:08

## 2025-05-03 RX ADMIN — Medication 1000 UNITS: at 09:07

## 2025-05-03 RX ADMIN — SENNOSIDES AND DOCUSATE SODIUM 2 TABLET: 50; 8.6 TABLET ORAL at 11:05

## 2025-05-03 RX ADMIN — BUDESONIDE 0.5 MG: 0.5 INHALANT RESPIRATORY (INHALATION) at 07:26

## 2025-05-03 RX ADMIN — MONTELUKAST 10 MG: 10 TABLET, FILM COATED ORAL at 21:49

## 2025-05-03 RX ADMIN — Medication 4 ML: at 07:25

## 2025-05-03 RX ADMIN — OXYBUTYNIN CHLORIDE 10 MG: 10 TABLET, EXTENDED RELEASE ORAL at 21:49

## 2025-05-03 RX ADMIN — DOXYCYCLINE 100 MG: 100 CAPSULE ORAL at 09:08

## 2025-05-03 RX ADMIN — IPRATROPIUM BROMIDE AND ALBUTEROL SULFATE 3 ML: .5; 3 SOLUTION RESPIRATORY (INHALATION) at 18:56

## 2025-05-03 RX ADMIN — DOXYCYCLINE 100 MG: 100 CAPSULE ORAL at 21:49

## 2025-05-03 RX ADMIN — Medication 4 ML: at 19:04

## 2025-05-03 RX ADMIN — ROSUVASTATIN CALCIUM 20 MG: 10 TABLET, FILM COATED ORAL at 21:49

## 2025-05-03 RX ADMIN — POLYETHYLENE GLYCOL 3350 17 G: 17 POWDER, FOR SOLUTION ORAL at 21:49

## 2025-05-03 RX ADMIN — Medication 1 APPLICATION: at 21:49

## 2025-05-03 RX ADMIN — GUAIFENESIN 1200 MG: 600 TABLET, EXTENDED RELEASE ORAL at 21:48

## 2025-05-03 RX ADMIN — Medication 1 APPLICATION: at 09:08

## 2025-05-03 RX ADMIN — CALCIUM CARBONATE-VITAMIN D TAB 500 MG-200 UNIT 1 TABLET: 500-200 TAB at 09:08

## 2025-05-03 RX ADMIN — SODIUM CHLORIDE 2000 MG: 9 INJECTION, SOLUTION INTRAVENOUS at 16:06

## 2025-05-03 RX ADMIN — IPRATROPIUM BROMIDE AND ALBUTEROL SULFATE 3 ML: .5; 3 SOLUTION RESPIRATORY (INHALATION) at 07:24

## 2025-05-03 RX ADMIN — ANTI-FUNGAL POWDER MICONAZOLE NITRATE TALC FREE 1 APPLICATION: 1.42 POWDER TOPICAL at 21:49

## 2025-05-03 RX ADMIN — PREDNISONE 40 MG: 20 TABLET ORAL at 09:08

## 2025-05-03 RX ADMIN — CETIRIZINE HYDROCHLORIDE 10 MG: 10 TABLET ORAL at 09:08

## 2025-05-03 RX ADMIN — CETIRIZINE HYDROCHLORIDE 10 MG: 10 TABLET ORAL at 21:49

## 2025-05-03 RX ADMIN — CARVEDILOL 3.12 MG: 3.12 TABLET, FILM COATED ORAL at 11:13

## 2025-05-03 NOTE — PROGRESS NOTES
[facility] Clinical Pharmacy Services: Clinical Pharmacy Consult - Warfarin Dosing/Monitoring    Results from last 7 days   Lab Units 05/03/25  0530 05/02/25  0503 05/01/25  0525 04/30/25  0350 04/29/25  0259   INR  3.64* 3.18* 2.41* 2.51* 2.18*     Indication: A Fib - requiring full anticoagulation  Target INR: 2 - 3    Current Dose: 2 mg daily (has received this dose the past 5 days)  Comments:   INR jump overnight  DDI with ceftriaxone and ID has not finalized their plan at this time so will continue to monitor INR daily.  Doxycycline added this am after labs drawn so not a factor in INR increase    Plan:  Will hold warfarin dose again today.      RN to Monitor for any signs or symptoms of bleeding  Pharmacy services to follow up daily INRs and dose adjustments    Pharmacy will continue to follow until discharge or discontinuation of warfarin.   Pattie Mckenzie, [facility]    Clinical Pharmacist

## 2025-05-03 NOTE — PLAN OF CARE
Problem: Adult Inpatient Plan of Care  Goal: Plan of Care Review  Outcome: Progressing  Flowsheets (Taken 5/3/2025 1596)  Progress: improving  Outcome Evaluation: Pt vitals stable. Up to chair for dinner and lunch. Up to bsc had small bm. q 2 turn. IV and oral abx continued. Leg drsg changed. Pt safety maintained  Plan of Care Reviewed With: patient

## 2025-05-03 NOTE — PROGRESS NOTES
[facility]     Mar/chart reviewed  Follow up CHARLENE on home bipap; severe persistent asthma, with ae started 5/2 now on po steroids  Continued improvement in cough/wheezing, but still present, she says the flutter doesn't work well for her, asking about a vest, says she is NOT on fasenra at home and never has been.  She is only on budesonide and dounebs and 7% saline at home.   Uses bipap some    Vital Sign Min/Max for last 24 hours  Temp  Min: 97.3 °F (36.3 °C)  Max: 97.5 °F (36.4 °C)   BP  Min: 114/68  Max: 120/58   Pulse  Min: 80  Max: 85   Resp  Min: 18  Max: 20   SpO2  Min: 92 %  Max: 100 %   No data recorded   Weight  Min: 78.6 kg (173 lb 4.5 oz)  Max: 78.6 kg (173 lb 4.5 oz)     Nad, axox3,   perrl, eomi, normal sclera  mmm, no jvd, trachea midline, neck supple,  chest coarse bilaterally, no crackles, +wheezes,   rrr,   soft, nt, nd +bs,  no c/c/ 1+ edema  Skin warm, dry no rashes, +cellulitis. (Wrapped right le)    Labs; 5/3: reviewed;  Bun 22  Cr 0.75  Bicarb 26  Wbc 26 (23)  Hgb 10.4  Plts 174    A/P:  CHARLENE - continue bipap -- she has home bipap  Severe persistent asthma with ae -- taper po antibiotics, continue pulmonary bronchodilators, and pulmonary toilet  Cellulitis -- antibiotics per ID  Leukocytosis -- suspect worsening is due to steroids  DMII with expected steroid induced hyperglycemica  Hypogammaglobulinemia  Anemia  Dysphagia -- on modified diet now, question if ae triggered by aspiration?  Elevated left law-diaphragm -- unclear if sniff test done in the past but really doesn't  for  her, does likely result in ineffective cough.     Continue hypertonic saline on d/c; will see if we can get her vest through office.  She will need to add lama and LABA via nebulization on d/c.  Check with pharmacy to make sure covered but she tells me she thinks she should be able to get this affordably via Part B which indeed should be correct.     She would benefit from a Vest for  pulmonary toilet, as she has tried and failed flutter valve and she has bronchiectasis visible on CT chest from 1/625 in the lower lobes bilaterally and she has ineffective cough from elevated left sided diaphragm.     She would like to stay until Monday which seems reasonable given leukocytosis and pulmonary toileting needs.     Follow up with Dr. Coburn in office in 2 weeks

## 2025-05-03 NOTE — PLAN OF CARE
Goal Outcome Evaluation:  Plan of Care Reviewed With: patient        Progress: improving  Outcome Evaluation: Vital signs stable. No c/o pain. Vpaced. Room air while awake. 2L NC or bipap with sleep. Pt refuses bipap at times. Denies soa. Sats maintained 90% or greater. IV rocephin and po doxycycline continued. Q2 turn and skin care provided. Safety maintained. Pt slept throughout the night and between care. No complaints or concerns. Needs met. Possible d/c over the weekend. Plan of care ongoing.

## 2025-05-03 NOTE — PROGRESS NOTES
Pharmacy Consult: Alona cost    Ninoska #30 nebs as 30 day supple through part b plan (not covered by part d) is $211.45    Darwin #60 nebs as a 30 day supply $21.44    Catherine Yang PharmD, BCPS  5/3/2025 15:17 EDT

## 2025-05-03 NOTE — PROGRESS NOTES
cxr  Name: Caitlyn Longoria ADMIT: 2025   : 1934  PCP: Zenaida Suarez MD    MRN: [id number] LOS: 8 days   AGE/SEX: 89 y.o. female  ROOM: [facility]     Subjective   Subjective   Breathing is okay. No other complaints     Objective   Objective   Vital Signs  Temp:  [97.3 °F (36.3 °C)-97.5 °F (36.4 °C)] 97.3 °F (36.3 °C)  Heart Rate:  [80-85] 80  Resp:  [18-20] 18  BP: (114-120)/(58-73) 118/73  SpO2:  [92 %-100 %] 96 %  on  Flow (L/min) (Oxygen Therapy):  [2] 2;   Device (Oxygen Therapy): room air  Body mass index is 30.7 kg/m².    Physical Exam  Constitutional:       General: She is not in acute distress.     Appearance: She is ill-appearing. She is not toxic-appearing.   HENT:      Head: Normocephalic and atraumatic.   Cardiovascular:      Rate and Rhythm: Normal rate and regular rhythm.   Pulmonary:      Breath sounds: Wheezing present.   Abdominal:      General: Bowel sounds are normal.      Palpations: Abdomen is soft.      Tenderness: There is no abdominal tenderness. There is no guarding or rebound.   Musculoskeletal:      Comments: Right lower extremity dressing   Skin:     General: Skin is warm and dry.   Neurological:      General: No focal deficit present.      Mental Status: She is alert and oriented to person, place, and time.   Psychiatric:         Mood and Affect: Mood normal.         Behavior: Behavior normal.     Results Review  I reviewed the patient's new clinical results.  Results from last 7 days   Lab Units 25  0530 25  0503 25  0526 25  0349   WBC 10*3/mm3 26.34* 23.19* 15.18* 15.54*   HEMOGLOBIN g/dL 10.4* 11.1* 10.2* 10.5*   PLATELETS 10*3/mm3 174 162 119* 113*     Results from last 7 days   Lab Units 25  0530 25  0503 25  0526 25  1522 25  0349   SODIUM mmol/L 137 138 138  --  139   POTASSIUM mmol/L 3.9 4.8 3.9 4.4 3.6   CHLORIDE mmol/L 101 102 104  --  104   CO2 mmol/L 26.7 23.0 23.0  --  25.2   BUN mg/dL 22 14 10   "--  12   CREATININE mg/dL 0.75 0.89 0.69  --  0.77   GLUCOSE mg/dL 283* 253* 164*  --  169*     Lab Results   Component Value Date    ANIONGAP 9.3 05/03/2025     Estimated Creatinine Clearance: 49.5 mL/min (by C-G formula based on SCr of 0.75 mg/dL).   Lab Results   Component Value Date    EGFR 75.7 05/03/2025     Results from last 7 days   Lab Units 05/02/25  0503 04/27/25  0336   ALBUMIN g/dL 2.7* 2.5*   BILIRUBIN mg/dL 0.5 0.8   ALK PHOS U/L 270* 129*   AST (SGOT) U/L 26 89*   ALT (SGPT) U/L 57* 56*     Results from last 7 days   Lab Units 05/03/25 0530 05/02/25 0503 05/01/25  0526 04/30/25  0349 04/28/25  0438 04/27/25  0336   CALCIUM mg/dL 8.6 8.8 8.2 8.7   < > 7.9*   ALBUMIN g/dL  --  2.7*  --   --   --  2.5*    < > = values in this interval not displayed.           No results found for: \"HGBA1C\", \"POCGLU\"    FL Video Swallow Single Contrast  Result Date: 5/2/2025  Fluoroscopy was provided for the speech pathologist during a video swallow study. For full details please see the speech pathology report  Dose: 10.08 mgy  This report was finalized on 5/2/2025 8:06 PM by Dr. Alberot Mcfarland M.D on Workstation: [id number]      XR Chest 1 View  Result Date: 5/1/2025  As described.  This report was finalized on 5/1/2025 1:40 PM by Dr. Mitul Hudson M.D on Workstation: [id number]        Results from last 7 days   Lab Units 05/03/25 0530 05/02/25 0503 05/01/25 0525   INR  3.64* 3.18* 2.41*      Scheduled Meds  budesonide, 0.5 mg, Nebulization, BID - RT  calcium 500 mg vitamin D 5 mcg (200 UT), 1 tablet, Oral, BID  cefTRIAXone, 2,000 mg, Intravenous, Q24H  cetirizine, 10 mg, Oral, BID  cholecalciferol, 1,000 Units, Oral, Daily  doxycycline, 100 mg, Oral, Q12H  fluticasone, 1 spray, Nasal, Daily  guaiFENesin, 1,200 mg, Oral, Q12H  ipratropium-albuterol, 3 mL, Nebulization, Q6H While Awake - RT  Menthol-Zinc Oxide, 1 Application, Topical, Q12H  miconazole, 1 Application, Topical, Q12H  montelukast, 10 mg, Oral, " Nightly  oxybutynin XL, 10 mg, Oral, BID  predniSONE, 40 mg, Oral, Daily With Breakfast  rosuvastatin, 20 mg, Oral, Nightly  sodium chloride, 4 mL, Nebulization, BID - RT    Continuous Infusions  Pharmacy Consult,   Pharmacy to dose warfarin,     PRN Meds    acetaminophen **OR** acetaminophen **OR** acetaminophen    albuterol    senna-docusate sodium **AND** polyethylene glycol **AND** bisacodyl **AND** bisacodyl    Calcium Replacement - Follow Nurse / BPA Driven Protocol    Magnesium Standard Dose Replacement - Follow Nurse / BPA Driven Protocol    melatonin    ondansetron ODT **OR** ondansetron    Pharmacy Consult    Pharmacy to dose warfarin    Phosphorus Replacement - Follow Nurse / BPA Driven Protocol    Potassium Replacement - Follow Nurse / BPA Driven Protocol    [COMPLETED] Insert Peripheral IV **AND** sodium chloride    Pharmacy Consult,   Pharmacy to dose warfarin,     Diet  Diet: Cardiac; Healthy Heart (2-3 Na+); No Mixed Consistencies; Texture: Mechanical Ground (NDD 2); Fluid Consistency: Thin (IDDSI 0)       Assessment/Plan     Active Hospital Problems    Diagnosis  POA    **Cellulitis of right lower extremity [L03.115]  Yes    Moderate protein-calorie malnutrition [E44.0]  Yes    Thrombocytopenia [D69.6]  Yes    Sick sinus syndrome [I49.5]  Yes    Chronic HFrEF (heart failure with reduced ejection fraction) [I50.22]  Yes    COPD (chronic obstructive pulmonary disease) [J44.9]  Yes    Bilateral carotid artery disease [I77.9]  Yes    Anticoagulated on Coumadin [Z79.01]  Not Applicable    CLL (chronic lymphocytic leukemia) [C91.10]  Yes    HTN (hypertension) [I10]  Yes    CKD (chronic kidney disease), stage III [N18.30]  Yes    Paroxysmal atrial fibrillation [I48.0]  Yes    Type 2 diabetes mellitus [E11.9]  Yes    HLD (hyperlipidemia) [E78.5]  Yes    Coronary artery disease [I25.10]  Yes      Resolved Hospital Problems   No resolved problems to display.     Patient is a 89 y.o. 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 Miller  - patient developed RLE cellulitis while on suppressive keflex  - pt initially with borderline low Bps 70s/40s- she responded well to IVF, did not meet sepsis criteria (WBC elevation only)  -RLE doppler NEGATIVE for DVT  - Increased WBC (steroids), blood cx are NG at 5 days  - antibiotics per ID     Atrial fibrillation on warfarin  S/p pacemaker  HTN  HLD  - pt was hypotensive on arrival to ED so her coreg and loop diuretic were held, she responded to IV fluids- resume coreg. consider resume diuretic tomorrow depending on BP  - cont statin  - pharmacy to dose warfarin     COPD/chronic hypoxic respiratory failure on home O2  Asthma exacerbation  CHARLENE on bipap  -Pulmonology added IV steroids for exacerbation 05/01/25, now on PO  -CXR 5/1/2025 atelectasis/infiltrate lower lungs, minimal right pleural effusion  -Respiratory panel negative  - Patient reports that she is unable to bring her BiPAP machine in from home as she is worried it is going to be broken during transit which has happened previously  - Pulmonology recommends continuing hypertonic saline at discharge, vest for pulmonary toilet     CLL- has never required tx/follows with Dr. Larkin at [facility]  Hypogammaglobulinemia on monthly IVIG     Weakness  - acute on chronic  - Planning for SNF    DVT prophylaxis  Warfarin (home med)    Discharge  SNF plan for monday  Expected Discharge Date: 5/3/2025; Expected Discharge Time:     Discussed with patient and nursing staff     Steve Khan MD  [facility]  05/03/25 10:38 EDT

## 2025-05-04 LAB
ANION GAP SERPL CALCULATED.3IONS-SCNC: 10.9 MMOL/L (ref 5–15)
BUN SERPL-MCNC: 22 MG/DL (ref 8–23)
BUN/CREAT SERPL: 31.9 (ref 7–25)
CALCIUM SPEC-SCNC: 8.3 MG/DL (ref 8.2–9.6)
CHLORIDE SERPL-SCNC: 101 MMOL/L (ref 98–107)
CO2 SERPL-SCNC: 25.1 MMOL/L (ref 22–29)
CREAT SERPL-MCNC: 0.69 MG/DL (ref 0.57–1)
DEPRECATED RDW RBC AUTO: 49.5 FL (ref 37–54)
EGFRCR SERPLBLD CKD-EPI 2021: 82.6 ML/MIN/1.73
ERYTHROCYTE [DISTWIDTH] IN BLOOD BY AUTOMATED COUNT: 13.5 % (ref 12.3–15.4)
GLUCOSE SERPL-MCNC: 312 MG/DL (ref 65–99)
HCT VFR BLD AUTO: 35.3 % (ref 34–46.6)
HGB BLD-MCNC: 10.9 G/DL (ref 12–15.9)
INR PPP: 3.37 (ref 0.9–1.1)
MCH RBC QN AUTO: 31.1 PG (ref 26.6–33)
MCHC RBC AUTO-ENTMCNC: 30.9 G/DL (ref 31.5–35.7)
MCV RBC AUTO: 100.9 FL (ref 79–97)
PLATELET # BLD AUTO: 164 10*3/MM3 (ref 140–450)
PMV BLD AUTO: 10.1 FL (ref 6–12)
POTASSIUM SERPL-SCNC: 4.2 MMOL/L (ref 3.5–5.2)
PROTHROMBIN TIME: 34.6 SECONDS (ref 11.7–14.2)
RBC # BLD AUTO: 3.5 10*6/MM3 (ref 3.77–5.28)
SODIUM SERPL-SCNC: 137 MMOL/L (ref 136–145)
WBC NRBC COR # BLD AUTO: 27.53 10*3/MM3 (ref 3.4–10.8)

## 2025-05-04 PROCEDURE — 94799 UNLISTED PULMONARY SVC/PX: CPT

## 2025-05-04 PROCEDURE — 85610 PROTHROMBIN TIME: CPT | Performed by: STUDENT IN AN ORGANIZED HEALTH CARE EDUCATION/TRAINING PROGRAM

## 2025-05-04 PROCEDURE — 25010000002 CEFTRIAXONE PER 250 MG: Performed by: INTERNAL MEDICINE

## 2025-05-04 PROCEDURE — 80048 BASIC METABOLIC PNL TOTAL CA: CPT | Performed by: STUDENT IN AN ORGANIZED HEALTH CARE EDUCATION/TRAINING PROGRAM

## 2025-05-04 PROCEDURE — 63710000001 PREDNISONE PER 1 MG: Performed by: INTERNAL MEDICINE

## 2025-05-04 PROCEDURE — 94761 N-INVAS EAR/PLS OXIMETRY MLT: CPT

## 2025-05-04 PROCEDURE — 94669 MECHANICAL CHEST WALL OSCILL: CPT

## 2025-05-04 PROCEDURE — 85027 COMPLETE CBC AUTOMATED: CPT | Performed by: HOSPITALIST

## 2025-05-04 RX ADMIN — BUDESONIDE 0.5 MG: 0.5 INHALANT RESPIRATORY (INHALATION) at 06:47

## 2025-05-04 RX ADMIN — SODIUM CHLORIDE 2000 MG: 9 INJECTION, SOLUTION INTRAVENOUS at 16:27

## 2025-05-04 RX ADMIN — GUAIFENESIN 1200 MG: 600 TABLET, EXTENDED RELEASE ORAL at 09:08

## 2025-05-04 RX ADMIN — Medication 1 APPLICATION: at 09:11

## 2025-05-04 RX ADMIN — OXYBUTYNIN CHLORIDE 10 MG: 10 TABLET, EXTENDED RELEASE ORAL at 09:08

## 2025-05-04 RX ADMIN — ANTI-FUNGAL POWDER MICONAZOLE NITRATE TALC FREE 1 APPLICATION: 1.42 POWDER TOPICAL at 20:57

## 2025-05-04 RX ADMIN — GUAIFENESIN 1200 MG: 600 TABLET, EXTENDED RELEASE ORAL at 20:56

## 2025-05-04 RX ADMIN — CETIRIZINE HYDROCHLORIDE 10 MG: 10 TABLET ORAL at 09:08

## 2025-05-04 RX ADMIN — DOXYCYCLINE 100 MG: 100 CAPSULE ORAL at 09:08

## 2025-05-04 RX ADMIN — CETIRIZINE HYDROCHLORIDE 10 MG: 10 TABLET ORAL at 20:56

## 2025-05-04 RX ADMIN — BUDESONIDE 0.5 MG: 0.5 INHALANT RESPIRATORY (INHALATION) at 19:48

## 2025-05-04 RX ADMIN — Medication 1 APPLICATION: at 20:57

## 2025-05-04 RX ADMIN — IPRATROPIUM BROMIDE AND ALBUTEROL SULFATE 3 ML: .5; 3 SOLUTION RESPIRATORY (INHALATION) at 10:57

## 2025-05-04 RX ADMIN — DOXYCYCLINE 100 MG: 100 CAPSULE ORAL at 20:56

## 2025-05-04 RX ADMIN — OXYBUTYNIN CHLORIDE 10 MG: 10 TABLET, EXTENDED RELEASE ORAL at 20:56

## 2025-05-04 RX ADMIN — FLUTICASONE PROPIONATE 1 SPRAY: 50 SPRAY, METERED NASAL at 09:11

## 2025-05-04 RX ADMIN — Medication 4 ML: at 06:48

## 2025-05-04 RX ADMIN — MONTELUKAST 10 MG: 10 TABLET, FILM COATED ORAL at 20:56

## 2025-05-04 RX ADMIN — ANTI-FUNGAL POWDER MICONAZOLE NITRATE TALC FREE 1 APPLICATION: 1.42 POWDER TOPICAL at 09:11

## 2025-05-04 RX ADMIN — CALCIUM CARBONATE-VITAMIN D TAB 500 MG-200 UNIT 1 TABLET: 500-200 TAB at 12:05

## 2025-05-04 RX ADMIN — ROSUVASTATIN CALCIUM 20 MG: 10 TABLET, FILM COATED ORAL at 20:56

## 2025-05-04 RX ADMIN — CARVEDILOL 3.12 MG: 3.12 TABLET, FILM COATED ORAL at 17:33

## 2025-05-04 RX ADMIN — IPRATROPIUM BROMIDE AND ALBUTEROL SULFATE 3 ML: .5; 3 SOLUTION RESPIRATORY (INHALATION) at 19:47

## 2025-05-04 RX ADMIN — Medication 1000 UNITS: at 12:05

## 2025-05-04 RX ADMIN — Medication 4 ML: at 19:55

## 2025-05-04 RX ADMIN — BISACODYL 5 MG: 5 TABLET, COATED ORAL at 09:08

## 2025-05-04 RX ADMIN — CALCIUM CARBONATE-VITAMIN D TAB 500 MG-200 UNIT 1 TABLET: 500-200 TAB at 23:04

## 2025-05-04 RX ADMIN — IPRATROPIUM BROMIDE AND ALBUTEROL SULFATE 3 ML: .5; 3 SOLUTION RESPIRATORY (INHALATION) at 06:46

## 2025-05-04 RX ADMIN — PREDNISONE 40 MG: 20 TABLET ORAL at 09:08

## 2025-05-04 RX ADMIN — ALBUTEROL SULFATE 2.5 MG: 2.5 SOLUTION RESPIRATORY (INHALATION) at 16:40

## 2025-05-04 NOTE — PROGRESS NOTES
[facility]      Mar/chart reviewed  Follow up CHARLENE on home bipap; severe persistent asthma, with ae started 5/2 now on po steroids  Continued improvement in cough/wheezing, but still present,    Vital Sign Min/Max for last 24 hours  Temp  Min: 97.5 °F (36.4 °C)  Max: 98.3 °F (36.8 °C)   BP  Min: 91/59  Max: 151/81   Pulse  Min: 78  Max: 102   Resp  Min: 18  Max: 28   SpO2  Min: 90 %  Max: 100 %   Flow (L/min) (Oxygen Therapy)  Min: 2  Max: 2   Weight  Min: 82.5 kg (181 lb 14.1 oz)  Max: 82.5 kg (181 lb 14.1 oz)     Nad, axox3,   perrl, eomi, normal sclera  mmm, no jvd, trachea midline, neck supple,  chest coarse bilaterally, no crackles, +wheezes,   rrr,   soft, nt, nd +bs,  no c/c/ 1+ edema  Skin warm, dry no rashes, +cellulitis. (Wrapped right le)    Labs: 5/4: reviewed:  Nothing new    A/P:  CHARLENE - continue bipap -- she has bipap at home to continue on d/c  Severe persistent asthma with ae -- taper po antibiotics, continue pulmonary bronchodilators, and pulmonary toilet  Cellulitis -- antibiotics per ID  Leukocytosis -- suspect worsening is due to steroids  DMII with expected steroid induced hyperglycemica  Hypogammaglobulinemia  Anemia  Dysphagia -- on modified diet now, question if ae triggered by aspiration?  Elevated left law-diaphragm -- unclear if sniff test done in the past but really doesn't  for  her, does likely result in ineffective cough.      ary co-pay appears to high; continue budesondie and add brovana  as well.  Continue hypertonic nebs.    I have messaged my office to get her a vest set up.      Follow up with Dr. Coburn in office in 2 weeks.

## 2025-05-04 NOTE — PLAN OF CARE
Goal Outcome Evaluation:  Plan of Care Reviewed With: patient        Progress: improving  Outcome Evaluation: Vital signs stable. No c/o pain. PRN miralax given tonight. Skin and wound care completed. Q2hr turn. Pt wore bipap for 4.5 hours tonight. No complaints or concerns. Safety maintained. Plan of care ongoing.

## 2025-05-04 NOTE — PLAN OF CARE
Problem: Adult Inpatient Plan of Care  Goal: Plan of Care Review  Outcome: Progressing  Flowsheets (Taken 5/4/2025 1715)  Progress: no change  Outcome Evaluation: Pt vital stable. Up to chair for lunch. Large bm. Pt very weak after bm so back to bed. Q 2 turn.IV abx continued. Possible dc tomorrow.  Pt safety maintained.  Plan of Care Reviewed With: patient

## 2025-05-04 NOTE — PROGRESS NOTES
cxr  Name: Caitlyn Longoria ADMIT: 2025   : 1934  PCP: Zenaida Suarez MD    MRN: [id number] LOS: 9 days   AGE/SEX: 89 y.o. female  ROOM: [facility]     Subjective   Subjective   She states breathing is better today.     Objective   Objective   Vital Signs  Temp:  [97.5 °F (36.4 °C)-98.3 °F (36.8 °C)] 98.3 °F (36.8 °C)  Heart Rate:  [] 78  Resp:  [18-28] 18  BP: ()/(53-81) 96/58  SpO2:  [90 %-100 %] 98 %  on  Flow (L/min) (Oxygen Therapy):  [2] 2;   Device (Oxygen Therapy): room air  Body mass index is 32.23 kg/m².    Physical Exam  Constitutional:       General: She is not in acute distress.     Appearance: She is ill-appearing. She is not toxic-appearing.   HENT:      Head: Normocephalic and atraumatic.   Cardiovascular:      Rate and Rhythm: Normal rate and regular rhythm.   Pulmonary:      Breath sounds: Wheezing (mild) present.   Abdominal:      General: Bowel sounds are normal.      Palpations: Abdomen is soft.      Tenderness: There is no abdominal tenderness. There is no guarding or rebound.   Musculoskeletal:      Comments: Right lower extremity dressing   Skin:     General: Skin is warm and dry.   Neurological:      General: No focal deficit present.      Mental Status: She is alert and oriented to person, place, and time.   Psychiatric:         Mood and Affect: Mood normal.         Behavior: Behavior normal.     Results Review  I reviewed the patient's new clinical results.  Results from last 7 days   Lab Units 25  0830 25  0530 25  0503 25  0526   WBC 10*3/mm3 27.53* 26.34* 23.19* 15.18*   HEMOGLOBIN g/dL 10.9* 10.4* 11.1* 10.2*   PLATELETS 10*3/mm3 164 174 162 119*     Results from last 7 days   Lab Units 25  0830 25  0530 25  0503 25  0526   SODIUM mmol/L 137 137 138 138   POTASSIUM mmol/L 4.2 3.9 4.8 3.9   CHLORIDE mmol/L 101 101 102 104   CO2 mmol/L 25.1 26.7 23.0 23.0   BUN mg/dL 22 22 14 10   CREATININE mg/dL 0.69 0.75  "0.89 0.69   GLUCOSE mg/dL 312* 283* 253* 164*     Lab Results   Component Value Date    ANIONGAP 10.9 05/04/2025     Estimated Creatinine Clearance: 55.1 mL/min (by C-G formula based on SCr of 0.69 mg/dL).   Lab Results   Component Value Date    EGFR 82.6 05/04/2025     Results from last 7 days   Lab Units 05/02/25  0503   ALBUMIN g/dL 2.7*   BILIRUBIN mg/dL 0.5   ALK PHOS U/L 270*   AST (SGOT) U/L 26   ALT (SGPT) U/L 57*     Results from last 7 days   Lab Units 05/04/25  0830 05/03/25  0530 05/02/25  0503 05/01/25  0526   CALCIUM mg/dL 8.3 8.6 8.8 8.2   ALBUMIN g/dL  --   --  2.7*  --            No results found for: \"HGBA1C\", \"POCGLU\"    No radiology results for the last day      Results from last 7 days   Lab Units 05/04/25  0830 05/03/25  0530 05/02/25  0503   INR  3.37* 3.64* 3.18*      Scheduled Meds  budesonide, 0.5 mg, Nebulization, BID - RT  calcium 500 mg vitamin D 5 mcg (200 UT), 1 tablet, Oral, BID  carvedilol, 3.125 mg, Oral, BID With Meals  cefTRIAXone, 2,000 mg, Intravenous, Q24H  cetirizine, 10 mg, Oral, BID  cholecalciferol, 1,000 Units, Oral, Daily  doxycycline, 100 mg, Oral, Q12H  fluticasone, 1 spray, Nasal, Daily  guaiFENesin, 1,200 mg, Oral, Q12H  ipratropium-albuterol, 3 mL, Nebulization, Q6H While Awake - RT  Menthol-Zinc Oxide, 1 Application, Topical, Q12H  miconazole, 1 Application, Topical, Q12H  montelukast, 10 mg, Oral, Nightly  oxybutynin XL, 10 mg, Oral, BID  predniSONE, 40 mg, Oral, Daily With Breakfast  rosuvastatin, 20 mg, Oral, Nightly  sodium chloride, 4 mL, Nebulization, BID - RT    Continuous Infusions  Pharmacy Consult,   Pharmacy to dose warfarin,     PRN Meds    acetaminophen **OR** acetaminophen **OR** acetaminophen    albuterol    senna-docusate sodium **AND** polyethylene glycol **AND** bisacodyl **AND** bisacodyl    Calcium Replacement - Follow Nurse / BPA Driven Protocol    Magnesium Standard Dose Replacement - Follow Nurse / BPA Driven Protocol    melatonin    " ondansetron ODT **OR** ondansetron    Pharmacy Consult    Pharmacy to dose warfarin    Phosphorus Replacement - Follow Nurse / BPA Driven Protocol    Potassium Replacement - Follow Nurse / BPA Driven Protocol    [COMPLETED] Insert Peripheral IV **AND** sodium chloride    Pharmacy Consult,   Pharmacy to dose warfarin,     Diet  Diet: Cardiac; Healthy Heart (2-3 Na+); No Mixed Consistencies; Texture: Mechanical Ground (NDD 2); Fluid Consistency: Thin (IDDSI 0)       Assessment/Plan     Active Hospital Problems    Diagnosis  POA    **Cellulitis of right lower extremity [L03.115]  Yes    Moderate protein-calorie malnutrition [E44.0]  Yes    Thrombocytopenia [D69.6]  Yes    Sick sinus syndrome [I49.5]  Yes    Chronic HFrEF (heart failure with reduced ejection fraction) [I50.22]  Yes    COPD (chronic obstructive pulmonary disease) [J44.9]  Yes    Bilateral carotid artery disease [I77.9]  Yes    Anticoagulated on Coumadin [Z79.01]  Not Applicable    CLL (chronic lymphocytic leukemia) [C91.10]  Yes    HTN (hypertension) [I10]  Yes    CKD (chronic kidney disease), stage III [N18.30]  Yes    Paroxysmal atrial fibrillation [I48.0]  Yes    Type 2 diabetes mellitus [E11.9]  Yes    HLD (hyperlipidemia) [E78.5]  Yes    Coronary artery disease [I25.10]  Yes      Resolved Hospital Problems   No resolved problems to display.     Patient is a 89 y.o. 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 Miller  - patient developed RLE cellulitis while on suppressive keflex  - pt initially with borderline low Bps 70s/40s- she responded well to IVF, did not meet sepsis criteria (WBC elevation only)  -RLE doppler negative for DVT  - Increased WBC (steroids), blood cx are NG at 5 days  - antibiotics per ID     Atrial fibrillation on warfarin  S/p pacemaker  HTN  HLD  - pt was hypotensive on arrival to ED so her coreg and loop diuretic were held, she responded to IV fluids- resume coreg.  Monitoring BP to eventually resume diuretic   - cont statin  - pharmacy to dose warfarin     COPD/chronic hypoxic respiratory failure on home O2  Asthma exacerbation  CHARLENE on bipap  -Pulmonology added IV steroids for exacerbation 05/01/25, now on PO  -CXR 5/1/2025 atelectasis/infiltrate lower lungs, minimal right pleural effusion  -Respiratory panel negative  - Patient reports that she is unable to bring her BiPAP machine in from home as she is worried it is going to be broken during transit which has happened previously  - Pulmonology recommends continuing hypertonic saline at discharge, vest for pulmonary toilet     CLL- has never required tx/follows with Dr. Larkin at [facility]  Hypogammaglobulinemia on monthly IVIG     Weakness  - acute on chronic  - Planning for SNF    DVT prophylaxis  Warfarin (home med)    Discharge  SNF plan for monday  Expected Discharge Date: 5/3/2025; Expected Discharge Time:     Discussed with patient and nursing staff     Steve Khan MD  [facility]  05/04/25 10:13 EDT

## 2025-05-04 NOTE — PROGRESS NOTES
[facility] Clinical Pharmacy Services: Clinical Pharmacy Consult - Warfarin Dosing/Monitoring    Results from last 7 days   Lab Units 05/04/25  0830 05/03/25  0530 05/02/25  0503 05/01/25  0525 04/30/25  0350   INR  3.37* 3.64* 3.18* 2.41* 2.51*     Indication: A Fib - requiring full anticoagulation  Target INR: 2 - 3    Current Dose: 2 mg daily (has received this dose the past 5 days)  Comments:   INR jump overnight  DDI with ceftriaxone  2 gram iv q12h and doxycycline 100 mg bid   last dose for both  abx on 5/9/25  Plan:  Will hold warfarin dose again today.       Inr remains supra therapeutic at 3.37  RN to Monitor for any signs or symptoms of bleeding  Pharmacy services to follow up daily INRs and dose adjustments    Pharmacy will continue to follow until discharge or discontinuation of warfarin.   Pattie Mckenzie, [facility]    Clinical Pharmacist

## 2025-05-05 VITALS
OXYGEN SATURATION: 97 % | DIASTOLIC BLOOD PRESSURE: 74 MMHG | RESPIRATION RATE: 20 BRPM | SYSTOLIC BLOOD PRESSURE: 131 MMHG | WEIGHT: 178.9 LBS | BODY MASS INDEX: 31.7 KG/M2 | TEMPERATURE: 97.7 F | HEIGHT: 63 IN | HEART RATE: 80 BPM

## 2025-05-05 LAB
ANION GAP SERPL CALCULATED.3IONS-SCNC: 8.7 MMOL/L (ref 5–15)
BUN SERPL-MCNC: 18 MG/DL (ref 8–23)
BUN/CREAT SERPL: 23.7 (ref 7–25)
CALCIUM SPEC-SCNC: 8.4 MG/DL (ref 8.2–9.6)
CHLORIDE SERPL-SCNC: 100 MMOL/L (ref 98–107)
CO2 SERPL-SCNC: 28.3 MMOL/L (ref 22–29)
CREAT SERPL-MCNC: 0.76 MG/DL (ref 0.57–1)
DEPRECATED RDW RBC AUTO: 47.6 FL (ref 37–54)
EGFRCR SERPLBLD CKD-EPI 2021: 74.5 ML/MIN/1.73
ERYTHROCYTE [DISTWIDTH] IN BLOOD BY AUTOMATED COUNT: 13 % (ref 12.3–15.4)
GLUCOSE SERPL-MCNC: 255 MG/DL (ref 65–99)
HCT VFR BLD AUTO: 36.2 % (ref 34–46.6)
HGB BLD-MCNC: 11.1 G/DL (ref 12–15.9)
INR PPP: 3.37 (ref 0.9–1.1)
MCH RBC QN AUTO: 30.7 PG (ref 26.6–33)
MCHC RBC AUTO-ENTMCNC: 30.7 G/DL (ref 31.5–35.7)
MCV RBC AUTO: 100 FL (ref 79–97)
PLATELET # BLD AUTO: 159 10*3/MM3 (ref 140–450)
PMV BLD AUTO: 10.5 FL (ref 6–12)
POTASSIUM SERPL-SCNC: 4.2 MMOL/L (ref 3.5–5.2)
PROTHROMBIN TIME: 34.6 SECONDS (ref 11.7–14.2)
RBC # BLD AUTO: 3.62 10*6/MM3 (ref 3.77–5.28)
SODIUM SERPL-SCNC: 137 MMOL/L (ref 136–145)
WBC NRBC COR # BLD AUTO: 23.58 10*3/MM3 (ref 3.4–10.8)

## 2025-05-05 PROCEDURE — 85027 COMPLETE CBC AUTOMATED: CPT | Performed by: HOSPITALIST

## 2025-05-05 PROCEDURE — 80048 BASIC METABOLIC PNL TOTAL CA: CPT | Performed by: STUDENT IN AN ORGANIZED HEALTH CARE EDUCATION/TRAINING PROGRAM

## 2025-05-05 PROCEDURE — 94799 UNLISTED PULMONARY SVC/PX: CPT

## 2025-05-05 PROCEDURE — 85610 PROTHROMBIN TIME: CPT | Performed by: STUDENT IN AN ORGANIZED HEALTH CARE EDUCATION/TRAINING PROGRAM

## 2025-05-05 PROCEDURE — 94761 N-INVAS EAR/PLS OXIMETRY MLT: CPT

## 2025-05-05 PROCEDURE — 94664 DEMO&/EVAL PT USE INHALER: CPT

## 2025-05-05 PROCEDURE — 63710000001 PREDNISONE PER 1 MG: Performed by: INTERNAL MEDICINE

## 2025-05-05 PROCEDURE — 94660 CPAP INITIATION&MGMT: CPT

## 2025-05-05 RX ORDER — ARFORMOTEROL TARTRATE 15 UG/2ML
15 SOLUTION RESPIRATORY (INHALATION)
Qty: 120 ML | Refills: 0 | Status: SHIPPED | OUTPATIENT
Start: 2025-05-05

## 2025-05-05 RX ORDER — FUROSEMIDE 20 MG/1
20 TABLET ORAL DAILY
Start: 2025-05-05

## 2025-05-05 RX ORDER — FUROSEMIDE 20 MG/1
20 TABLET ORAL DAILY
Status: DISCONTINUED | OUTPATIENT
Start: 2025-05-05 | End: 2025-05-05 | Stop reason: HOSPADM

## 2025-05-05 RX ORDER — CEFPODOXIME PROXETIL 200 MG/1
400 TABLET, FILM COATED ORAL 2 TIMES DAILY
Start: 2025-05-05 | End: 2025-05-10

## 2025-05-05 RX ORDER — GUAIFENESIN 600 MG/1
1200 TABLET, EXTENDED RELEASE ORAL EVERY 12 HOURS SCHEDULED
Start: 2025-05-05

## 2025-05-05 RX ORDER — DOXYCYCLINE 100 MG/1
100 CAPSULE ORAL 2 TIMES DAILY
Start: 2025-05-05 | End: 2025-05-10

## 2025-05-05 RX ORDER — PREDNISONE 10 MG/1
TABLET ORAL
Start: 2025-05-06 | End: 2025-05-17 | Stop reason: HOSPADM

## 2025-05-05 RX ADMIN — DOXYCYCLINE 100 MG: 100 CAPSULE ORAL at 09:13

## 2025-05-05 RX ADMIN — Medication 1 APPLICATION: at 09:13

## 2025-05-05 RX ADMIN — BUDESONIDE 0.5 MG: 0.5 INHALANT RESPIRATORY (INHALATION) at 07:14

## 2025-05-05 RX ADMIN — OXYBUTYNIN CHLORIDE 10 MG: 10 TABLET, EXTENDED RELEASE ORAL at 09:13

## 2025-05-05 RX ADMIN — PREDNISONE 40 MG: 20 TABLET ORAL at 09:13

## 2025-05-05 RX ADMIN — ANTI-FUNGAL POWDER MICONAZOLE NITRATE TALC FREE 1 APPLICATION: 1.42 POWDER TOPICAL at 09:13

## 2025-05-05 RX ADMIN — Medication 1000 UNITS: at 09:13

## 2025-05-05 RX ADMIN — FLUTICASONE PROPIONATE 1 SPRAY: 50 SPRAY, METERED NASAL at 09:13

## 2025-05-05 RX ADMIN — CETIRIZINE HYDROCHLORIDE 10 MG: 10 TABLET ORAL at 09:13

## 2025-05-05 RX ADMIN — GUAIFENESIN 1200 MG: 600 TABLET, EXTENDED RELEASE ORAL at 09:13

## 2025-05-05 RX ADMIN — CARVEDILOL 3.12 MG: 3.12 TABLET, FILM COATED ORAL at 09:13

## 2025-05-05 RX ADMIN — IPRATROPIUM BROMIDE AND ALBUTEROL SULFATE 3 ML: .5; 3 SOLUTION RESPIRATORY (INHALATION) at 07:14

## 2025-05-05 NOTE — PROGRESS NOTES
Discharge Planning Assessment  [facility]     Patient Name: Caitlyn Longoria  MRN: [id number]  Today's Date: 5/5/2025    Admit Date: 4/25/2025    Plan: [facility]   Discharge Needs Assessment    No documentation.                  Discharge Plan       Row Name 05/05/25 1155       Plan    Plan [facility]    Final Discharge Disposition Code 03 - skilled nursing facility (SNF)    Final Note Patient has discharge orders today and will be going to [facility] for skilled rehab. Spoke with Latonya/[facility] she is good to return today and will be going to [facility]. Call placed to family Zoey Adair [phone] [facility], she is agreeable with this discharge plan. Per Dr Coburn order for Percussion Vest, [facility] placed a call to [facility] [phone] and spoke with Elkin, he stated that Medicare will not cover the cost if patient is going to skilled rehab and will need to call [facility] when she is discharged home. [facility] explained this to Zoey RANGEL and provided with the phone number to call [facility] when patient is discharged home. Packet in cubby, ambulance note signed and discharge summary faxed.                  Continued Care and Services - Admitted Since 4/25/2025       Destination Coordination complete.      Service Provider Request Status Services Address Phone Fax Patient Preferred    [facility] [address] [place] [address] [phone] --              Home Medical Care       Service Provider Request Status Services Address Phone Fax Patient Preferred    [facility] Accepted -- [address], [address], [facility] [address] [phone] --                  Expected Discharge Date and Time       Expected Discharge Date Expected Discharge Time    May 5, 2025            Demographic Summary    No documentation.                  Functional Status    No documentation.                   Psychosocial    No documentation.                  Abuse/Neglect    No documentation.                  Legal    No documentation.                  Substance Abuse    No documentation.                  Patient Forms    No documentation.                     Mamie Ro RN

## 2025-05-05 NOTE — PROGRESS NOTES
"Nutrition Services    Patient Name: Caitlyn Longoria  YOB: 1934  MRN: [id number]  Admission date: 4/25/2025    Assessment Date:  05/05/25    NUTRITION EVALUATION      Reason for Encounter Follow-up Protocol   Diagnosis/Problem Admission Diagnosis:  Hypokalemia [E87.6]  Pedal edema [R60.0]  Cellulitis of right lower extremity [L03.115]  Sepsis [A41.9]  Leukocytosis, unspecified type [D72.829]  Sepsis without acute organ dysfunction, due to unspecified organism [A41.9]    Problem List:    Cellulitis of right lower extremity    Type 2 diabetes mellitus    Paroxysmal atrial fibrillation    HLD (hyperlipidemia)    Coronary artery disease    HTN (hypertension)    CKD (chronic kidney disease), stage III    CLL (chronic lymphocytic leukemia)    Anticoagulated on Coumadin    Bilateral carotid artery disease    COPD (chronic obstructive pulmonary disease)    Sick sinus syndrome    Thrombocytopenia    Chronic HFrEF (heart failure with reduced ejection fraction)    Moderate protein-calorie malnutrition     Narrative 89 yo, female admitted for hypokalemia, edema, and sepsis.     5/5 RD visited pt at bedside. Pt alert and responsive. Pt headed to a SNF today. Pt reports getting full quickly. Per pt Ash is offered before meals. Pt reports eating 25-50% of meals. Pt states she may eat more if they were smaller and more frequent. Pt had many questions related to the mechanically soft diet but recognizes she will not be taking care of herself for a least a couple months.    4/28 RD visted pt at bedside. Pt alert and responsive. Pt lives in an independent living facility and often eats at the restaurant on the bottom floor. Eats frozen meals when food at restaurant is not preferred. Pt reports she has some challenges with swallowing \"\"it gets stuck [behind sternum] and I don't have enough phlegm to make it move and it sometimes affects my breathing.\" She also stated she has passed all the swallow test conducted " "previously. Pt declined soft to chew / chopped diet. Pt also reported the mask she wears to do her breathing treatments has been bothering her cheeks recently, likely due to fat and muscle loss.     4/28  NFPE was conducted with consent.       PO Diet Diet: Cardiac; Healthy Heart (2-3 Na+); No Mixed Consistencies; Texture: Mechanical Ground (NDD 2); Fluid Consistency: Thin (IDDSI 0)   Allergies NKFA   Supplements Ash, BID   PO Intake % ~50%       Chewing/Swallowing Difficulty SLP following and dysphagia       Medications reviewed - cholecalciferol, Crestor, Rocephin, Prednisone   Labs  reviewed - hyperglycemia (312), Hemoglobin trending up, BUN/ Creat trending up, Alk Phos trending up       Physical Findings alert, oriented, overweight, room air, short of breath     Edema lower extremity , 3+ (moderate) Bilateral   GI Function WDL, last bowel movement: 5/4   Skin Status pressure injury: sacral spine stg 2, other: Right lower leg     Lines/Drains none   I/O reviewed, net fluid loss, and amount/timeframe:2.58 L since admission         Height  Weight  BMI  Weight Trend     Height: 160 cm (62.99\")  Weight: 81.1 kg (178 lb 14.4 oz) (05/05/25 0543)  Body mass index is 31.7 kg/m².  Gain, Amount/Timeframe: 9.9 lbs    Weight change: weight gain of 9.9 lbs (5.7%) over 4 day(s)    Significant?  Yes       NFPE Consented to exam, Date Completed: 4/28  See MSA below.       Nutrition Problem (PES) Problem: Malnutrition (moderate) and Increased Nutrient Needs  Etiology: Medical Diagnosis - CHF and Factors Affecting Nutrition - wounds and pressure injuries    Signs/Symptoms: Other (comment)3+ and 4+ edema, pressure injury, moderate muscle wasting per NFPE, and wounds.        Intervention/Plan Continue Ash BID to provide 90 calories, 7 grams L-Arginine, 7 grams L-Glutamine and 2.5 grams Protein (Collagen) per serving (if consumed), to support wound healing.    Monitor and encourage good PO intake.    Consider an insulin regimen " for sterioid induced hyperglycemia d/t hx of T2DM.     RD to follow up per protocol.       Malnutrition Severity Assessment (MSA)      Patient meets criteria for : Moderate (non-severe) Malnutrition             Results from last 7 days   Lab Units 05/04/25  0830 05/03/25  0530 05/02/25  0503   SODIUM mmol/L 137 137 138   POTASSIUM mmol/L 4.2 3.9 4.8   CHLORIDE mmol/L 101 101 102   CO2 mmol/L 25.1 26.7 23.0   BUN mg/dL 22 22 14   CREATININE mg/dL 0.69 0.75 0.89   CALCIUM mg/dL 8.3 8.6 8.8   BILIRUBIN mg/dL  --   --  0.5   ALK PHOS U/L  --   --  270*   ALT (SGPT) U/L  --   --  57*   AST (SGOT) U/L  --   --  26   GLUCOSE mg/dL 312* 283* 253*     Results from last 7 days   Lab Units 05/05/25  0452   HEMOGLOBIN g/dL 11.1*   HEMATOCRIT % 36.2     Lab Results   Component Value Date    HGBA1C 6.80 (H) 08/23/2024     Wt Readings from Last 10 Encounters:   05/05/25 81.1 kg (178 lb 14.4 oz)   03/20/25 78.6 kg (173 lb 4.8 oz)   03/03/25 80.7 kg (178 lb)   02/27/25 80.8 kg (178 lb 3.2 oz)   02/20/25 79.7 kg (175 lb 12.8 oz)   02/20/25 78.5 kg (173 lb)   02/07/25 78.5 kg (173 lb)   01/23/25 81.2 kg (179 lb)   01/06/25 84.5 kg (186 lb 4.6 oz)   12/26/24 84.5 kg (186 lb 3.2 oz)       Electronically signed by:  Ann-Marie Akbar RD  05/05/25 07:17 EDT

## 2025-05-05 NOTE — PLAN OF CARE
Problem: Adult Inpatient Plan of Care  Goal: Plan of Care Review  Outcome: Progressing  Flowsheets (Taken 5/5/2025 3792)  Progress: no change  Outcome Evaluation: Vital signs stable. Pt on room air while awake. Pt wore bipap for about 4 hours while sleeping. Foam dressing changed to coccyx. Dressing to right lower leg C/D/I.  Pt resting on and off between care. Potential discharge today to [facility]. Plan of care ongoing.  Plan of Care Reviewed With: patient

## 2025-05-05 NOTE — DISCHARGE SUMMARY
Date of Discharge:  5/5/2025    PCP: Zenaida Suarez MD    Discharge Diagnosis:   Active Hospital Problems    Diagnosis  POA    **Cellulitis of right lower extremity [L03.115]  Yes    Moderate protein-calorie malnutrition [E44.0]  Yes    Thrombocytopenia [D69.6]  Yes    Sick sinus syndrome [I49.5]  Yes    Chronic HFrEF (heart failure with reduced ejection fraction) [I50.22]  Yes    COPD (chronic obstructive pulmonary disease) [J44.9]  Yes    Bilateral carotid artery disease [I77.9]  Yes    Anticoagulated on Coumadin [Z79.01]  Not Applicable    CLL (chronic lymphocytic leukemia) [C91.10]  Yes    HTN (hypertension) [I10]  Yes    CKD (chronic kidney disease), stage III [N18.30]  Yes    Paroxysmal atrial fibrillation [I48.0]  Yes    Type 2 diabetes mellitus [E11.9]  Yes    HLD (hyperlipidemia) [E78.5]  Yes    Coronary artery disease [I25.10]  Yes      Resolved Hospital Problems   No resolved problems to display.          Consults       Date and Time Order Name Status Description    4/26/2025  3:52 PM Inpatient Pulmonology Consult Completed     4/26/2025  8:15 AM Inpatient Infectious Diseases Consult Completed     4/25/2025  3:07 PM [facility] (on-call MD unless specified) Details            Hospital Course  Patient is a 89 y.o. female with multiple medical problems admitted with right lower extremity cellulitis. She was on suppressive Keflex prior to arrival for right knee prosthetic joint infection. She also has a history of severe persistent asthma followed by pulmonology, CHARLENE on BiPAP, diabetes, CLL, hypogammaglobulinemia on IVIG, dysphagia, chronically elevated left hemidiaphragm, atrial fibrillation on warfarin, pacemaker, hypertension, hyperlipidemia.     Doppler was negative for DVT. She is on warfarin. ID manage antibiotics and she received IV antibiotics and the plan is to be discharged on cefpodoxime and doxycycline to complete a 14-day course (through 5/9/2025). Afterwards she will resume suppressive  Subjective:      History was provided by the mother. Carrie Samano is a 3 y.o. female who is brought in by her mother for this well child visit. Birth History    Birth     Weight: 7 lb 9.3 oz (3.44 kg)     HC 36 cm (14.17\")    Apgar     One: 9.0     Five: 9.0    Delivery Method: Vaginal, Spontaneous    Gestation Age: 40 5/7 wks     Immunization History   Administered Date(s) Administered    DTaP/Hib/IPV (Pentacel) 2018, 2018, 2018    Hepatitis A Ped/Adol (Havrix, Vaqta) 2019    Hepatitis B Ped/Adol (Engerix-B, Recombivax HB) 2017, 2018, 2018    Influenza, Quadv, 6-35 months, IM, PF (Fluzone, Afluria) 10/03/2018    MMRV (ProQuad) 2019    Pneumococcal Conjugate 13-valent (Priscila Amanda) 2018, 2018, 2018, 2019    Rotavirus Pentavalent (RotaTeq) 2018, 2018, 2018     Patient's medications, allergies, past medical, surgical, social and family histories were reviewed and updated as appropriate. Current Issues:  Current concerns on the part of Dannielle's mother include none. Sleep apnea screening: Does patient snore? no     Review of Nutrition:  Current diet: reviewed  Balanced diet? yes  Difficulties with feeding? no    Social Screening:  Current child-care arrangements: in home: primary caregiver is mother  Parental coping and self-care: doing well; no concerns  Secondhand smoke exposure? no       Objective:      Growth parameters are noted and are not appropriate for age. BMI > 90th   Appears to respond to sounds?  yes  Vision screening done? no    General:   alert, appears stated age and cooperative   Gait:   normal   Skin:   normal   Oral cavity:   lips, mucosa, and tongue normal; teeth and gums normal   Eyes:   sclerae white, pupils equal and reactive, red reflex normal bilaterally   Ears:   normal bilaterally   Neck:   no adenopathy, no carotid bruit, no JVD, supple, symmetrical, trachea midline and thyroid not Keflex for suppression of right knee PJI. She has some persistent leukocytosis likely from steroids prescribed by pulmonology for asthma exacerbation. Recommend recheck CBC after completing steroids.    Pulmonology followed for CHARLENE and she was treated for asthma exacerbation. She received IV steroids and is now on p.o. steroids. Steroids are going to be tapered. Pulmonology has set up a percussion vest that has been approved. They cleared her for discharge to SNF. They also recommended that she continue hypertonic saline at discharge.    She gets monthly IVIG for hypogammaglobulinemia. She had initial hypotension that responded to fluids. Some of her cardiac medications were held and they have been gradually restarted. Pharmacy has been dosing warfarin and INR today is 3.37. Continue to adjust dose for atrial fibrillation goal INR 2-3. It is higher probably from additional antibiotics.    I discussed the patient's findings and my recommendations with patient and nursing staff and Dr. Coburn.    Temp:  [97.7 °F (36.5 °C)-98.7 °F (37.1 °C)] 97.7 °F (36.5 °C)  Heart Rate:  [79-99] 80  Resp:  [18-24] 20  BP: ()/(50-97) 131/74  Body mass index is 31.7 kg/m².    Physical Exam  Constitutional:       General: She is not in acute distress.     Appearance: She is obese. She is ill-appearing. She is not toxic-appearing.   HENT:      Head: Normocephalic and atraumatic.   Cardiovascular:      Rate and Rhythm: Normal rate and regular rhythm.   Pulmonary:      Effort: Pulmonary effort is normal.      Breath sounds: No wheezing.      Comments: coarse  Abdominal:      General: Bowel sounds are normal.      Palpations: Abdomen is soft.      Tenderness: There is no abdominal tenderness.   Musculoskeletal:      Comments: RLE dressed   Skin:     General: Skin is warm and dry.   Neurological:      General: No focal deficit present.      Mental Status: She is alert and oriented to person, place, and time.   Psychiatric:         Mood  enlarged, symmetric, no tenderness/mass/nodules   Lungs:  clear to auscultation bilaterally   Heart:   regular rate and rhythm, S1, S2 normal, no murmur, click, rub or gallop   Abdomen:  soft, non-tender; bowel sounds normal; no masses,  no organomegaly   :  normal female   Extremities:   extremities normal, atraumatic, no cyanosis or edema   Neuro:  normal without focal findings, mental status, speech normal, alert and oriented x3, YANELY and reflexes normal and symmetric         Assessment:      Healthy exam. 1 y/o girl     The following has been discussed  Importance of parents being role models and adopt a healthy lifestyle. Lifestyle modifications should be applied to all members of the family. Less than 2 hrs of screen time/day. Exercise 6-7 days/week at least for one hr each day  5 portions of food and or vegetables/day  No sweetened beverages or less than 1/2 cup of juice/day. Switch to 2 % milk. Plan:      1. Anticipatory guidance: Gave CRS handout on well-child issues at this age. 2. Screening tests:   a. Venous lead level: yes (USPSTF/AAFP recommends at 1 year if at risk; CDC/AAP: if at risk, check at 1 year and 2 year)    b. Hb or HCT: yes (CDC recommends annually through age 11 years for children at risk; AAP recommends once age 6-12 months then once at 13 months-5 years)    c. PPD: no (Recommended annually if at risk: immunosuppression, clinical suspicion, poor/overcrowded living conditions, recent immigrant from [facility], contact with adults who are HIV+, homeless, IV drug users, NH residents, farm workers, or with active TB)    d. Cholesterol screening: no (AAP, AHA, and NCEP but not USPSTF recommends fasting lipid profile for h/o premature cardiovascular disease in a parent or grandparent less than 54years old; AAP but not USPSTF recommends total cholesterol if either parent has a cholesterol greater than 240)    3.  Immunizations today: DTaP, HIB and Hep A  History of and Affect: Mood normal.         Behavior: Behavior normal.       Disposition: Skilled Nursing Facility (DC - External)       Discharge Medications        PAUSE taking these medications        Instructions Start Date   calcium 500 mg vitamin D 5 mcg (200 UT) 500-5 MG-MCG tablet per tablet  Wait to take this until: May 12, 2025   1 tablet, Oral, 2 Times Daily      cephalexin 500 MG capsule  Wait to take this until: May 10, 2025  Commonly known as: KEFLEX   500 mg, Oral, 3 Times Daily             New Medications        Instructions Start Date   arformoterol 15 MCG/2ML nebulizer solution  Commonly known as: BROVANA   15 mcg, Nebulization, 2 Times Daily - RT      cefpodoxime 200 MG tablet  Commonly known as: VANTIN   400 mg, Oral, 2 Times Daily      doxycycline 100 MG capsule  Commonly known as: VIBRAMYCIN   100 mg, Oral, 2 Times Daily      guaiFENesin 600 MG 12 hr tablet  Commonly known as: MUCINEX   1,200 mg, Oral, Every 12 Hours Scheduled      Menthol-Zinc Oxide 0.44-20.6 % ointment   1 Application, Topical, Every 12 Hours Scheduled      miconazole 2 % powder  Commonly known as: MICOTIN   1 Application, Topical, Every 12 Hours Scheduled      predniSONE 10 MG tablet  Commonly known as: DELTASONE   Take 4 tablets by mouth Daily With Breakfast for 2 days, THEN 3 tablets Daily With Breakfast for 2 days, THEN 2 tablets Daily With Breakfast for 2 days, THEN 1 tablet Daily With Breakfast for 2 days.   Start Date: May 6, 2025            Changes to Medications        Instructions Start Date   melatonin 5 MG tablet tablet  What changed:   how much to take  when to take this  reasons to take this   2.5 mg, Oral, Nightly PRN             Continue These Medications        Instructions Start Date   acetaminophen 325 MG tablet  Commonly known as: TYLENOL   650 mg, Oral, Every 4 Hours PRN      albuterol (2.5 MG/3ML) 0.083% nebulizer solution  Commonly known as: PROVENTIL   2.5 mg, Every 4 Hours      albuterol sulfate  (90  Base) MCG/ACT inhaler  Commonly known as: PROVENTIL HFA;VENTOLIN HFA;PROAIR HFA   2 puffs, Every 4 Hours PRN      budesonide 0.5 MG/2ML nebulizer solution  Commonly known as: PULMICORT   0.5 mg, 2 Times Daily      carvedilol 3.125 MG tablet  Commonly known as: COREG   3.125 mg, 2 Times Daily With Meals      Cetirizine HCl 10 MG capsule   1 capsule, 2 Times Daily      cholecalciferol 25 MCG (1000 UT) tablet  Commonly known as: VITAMIN D3   1,000 Units, Daily      FASENRA SC   1 dose, Every 2 Months      fluticasone 50 MCG/ACT nasal spray  Commonly known as: FLONASE   1 spray, Daily      furosemide 20 MG tablet  Commonly known as: LASIX   20 mg, Oral, Daily      glipizide 5 MG tablet  Commonly known as: GLUCOTROL   5 mg, Daily With Breakfast      ipratropium-albuterol 0.5-2.5 mg/3 ml nebulizer  Commonly known as: DUO-NEB   3 mL, Nebulization, 4 Times Daily - RT      metFORMIN  MG 24 hr tablet  Commonly known as: GLUCOPHAGE-XR   500 mg, Daily With Breakfast      montelukast 10 MG tablet  Commonly known as: SINGULAIR   10 mg, Nightly      NAC PO   1,000 mg, 2 Times Daily      nystatin 550646 UNIT/GM cream  Commonly known as: MYCOSTATIN   1 Application, 2 Times Daily      nystatin-triamcinolone 297339-7.1 UNIT/GM-% cream  Commonly known as: MYCOLOG II   1 Application, 2 Times Daily      oxybutynin XL 10 MG 24 hr tablet  Commonly known as: DITROPAN-XL   10 mg, 2 Times Daily      rosuvastatin 20 MG tablet  Commonly known as: CRESTOR   20 mg, Nightly      sodium chloride 7 % nebulizer solution nebulizer solution   4 mL, 2 Times Daily      warfarin 4 MG tablet  Commonly known as: COUMADIN   Take one tablet by mouth on mon, wed, fri and take one-half of a tablet by mouth all other days or as directed             Stop These Medications      loperamide 2 MG capsule  Commonly known as: IMODIUM             Diet Instructions       Diet: Regular/House Diet, Cardiac Diets, Diabetic Diets; Healthy Heart (2-3 Na+); Consistent  Carbohydrate      Discharge Diet:  Regular/House Diet  Cardiac Diets  Diabetic Diets       Cardiac Diet: Healthy Heart (2-3 Na+)    Diabetic Diet: Consistent Carbohydrate    Diet Modifiers / Additional Diets: No Mixed Consistencies    Texture: Mechanical Ground (NDD 2)    Fluid Consistency: Thin (IDDSI 0)           Activity Instructions       Activity as Tolerated             Additional Instructions for the Follow-ups that You Need to Schedule       Call MD for problems / concerns.   As directed             Contact information for follow-up providers       Zenaida Suarez MD Follow up.    Specialty: Internal Medicine  Why: After hospital follow up  Contact information:  [address]  [facility] [id number] [phone]                       Contact information for after-discharge care       Destination       [facility] .    Service: Skilled Nursing  Contact information:  [address]  [facility] [address] [phone]                                  Future Appointments   Date Time Provider Department Center   8/11/2025 10:00 AM [facility] [place] [facility] DEVICE CHECK [facility] CD LCG40 None   8/11/2025 10:30 AM Jonah Regalado Jr., MD [facility] CD LCG60 [facility]   10/16/2025  9:00 AM LAB CHAIR 3 CBC [facility] BH LAB [facility] [place]   10/16/2025  9:20 AM Lucien Lakrin MD [facility] CBC [facility] [place]   10/16/2025  9:45 AM CHAIR 15 CBC KRE - SM BH INFUS [facility]RE [facility]   3/26/2026 10:00 AM [facility] OP VAS RM 3 BH [facility] OVKR [facility]   3/26/2026 10:30 AM Patricia Steve APRN [facility] VS [facility]   4/16/2026  1:10 PM Mehul Miller MD [facility] ID [facility]      Steve Khan MD  [facility]  05/05/25 11:26 EDT    Discharge time spent greater than 30 minutes.

## 2025-05-05 NOTE — PROGRESS NOTES
PROGRESS NOTE  Patient Name: Caitlyn Longoria  Age/Sex: 89 y.o. female  : 1934  MRN: [id number]    Date of Admission: 2025  Date of Encounter Visit: 25   LOS: 10 days   Patient Care Team:  Zenaida Suarez MD as PCP - General (Internal Medicine)  Pao Levi CHILO as Pharmacist  Alexander Valiente PharmD as Pharmacist (Pharmacy)  Zenaida Suarez MD as Referring Physician (Internal Medicine)  Lucien Larkin MD as Consulting Physician (Hematology and Oncology)    Chief Complaint: Cellulitis    Hospital course: INR therapeutic, white count is slightly down but still in the 20s  Electrolyte were normal  Patient is afebrile, currently on room air.        REVIEW OF SYSTEMS:   CONSTITUTIONAL: no fever or chills  CARDIOVASCULAR: No chest pain, chest pressure or chest discomfort. No palpitations or edema.   RESPIRATORY: Still coughing but not much secretions  GASTROINTESTINAL: No anorexia, nausea, vomiting or diarrhea. No abdominal pain or blood.   HEMATOLOGIC: No bleeding or bruising.     Ventilator/Non-Invasive Ventilation Settings (From admission, onward)       Start     Ordered    25  NIPPV (CPAP or BIPAP)  At Bedtime & As Needed-RT        Comments: 2lpm   Question Answer Comment   Indication Sleep Apnea    Type BIPAP    IPAP 14    EPAP 11    Titrate Oxygen for SpO2 90 - 95%        25 1703  NIPPV-IPPV / BIPAP / CPAP  At Bedtime & As Needed-RT,   Status:  Canceled        Question Answer Comment   Indication Sleep Apnea    Type BIPAP    IPAP 18    EPAP 6    Titrate Oxygen for SpO2 88% or Greater        25 1702                      Vital Signs  Temp:  [97.7 °F (36.5 °C)-98.7 °F (37.1 °C)] 97.7 °F (36.5 °C)  Heart Rate:  [79-99] 80  Resp:  [18-24] 20  BP: ()/(50-97) 131/74  SpO2:  [92 %-100 %] 97 %  on    Device (Oxygen Therapy): room air    Intake/Output Summary (Last 24 hours) at 2025 1008  Last data filed at 2025 0543  Gross  "per 24 hour   Intake 240 ml   Output 1050 ml   Net -810 ml     Flowsheet Rows      Flowsheet Row First Filed Value   Admission Height 160 cm (62.99\") Documented at 04/25/2025 1810   Admission Weight 78.2 kg (172 lb 8 oz) Documented at 04/25/2025 1357          Body mass index is 31.7 kg/m².      05/03/25  0300 05/04/25  0627 05/05/25  0543   Weight: 78.6 kg (173 lb 4.5 oz) 82.5 kg (181 lb 14.1 oz) 81.1 kg (178 lb 14.4 oz)       Physical Exam:  GEN:  No acute distress, alert, cooperative, well developed, dysphonic  EYES:   Sclerae clear. No icterus. PERRL. Normal EOM  ENT:   External ears/nose normal, no oral lesions, no thrush, mucous membranes moist  NECK:  Supple, midline trachea, no JVD  LUNGS: Normal chest on inspection, despite the dysphonia I do not or any significant wet rhonchi.  Positive dysphonia, diminished breath sounds mostly on the left. Respirations regular, even and unlabored.   CV:  Regular rhythm and rate. Normal S1/S2. No murmurs, gallops, or rubs noted.  ABD:  Soft, nontender and nondistended. Normal bowel sounds. No guarding  EXT:  Moves all extremities well. No cyanosis. No redness.  Minimal edema, resolved cellulitis  Skin: Dry, intact, no bleeding    Results Review:    Results From Last 14 Days   Lab Units 05/01/25  0526 04/28/25  0438 04/25/25  1550 04/25/25  1237   CRP mg/dL 8.43* 11.48*  --   --    LACTATE mmol/L  --   --  1.2 2.1*     Results from last 7 days   Lab Units 05/04/25  0830 05/03/25  0530 05/02/25  0503 05/01/25  0526 04/30/25  1522 04/30/25  0349 04/29/25  0259   SODIUM mmol/L 137 137 138 138  --  139 136   POTASSIUM mmol/L 4.2 3.9 4.8 3.9 4.4 3.6 3.8   CHLORIDE mmol/L 101 101 102 104  --  104 104   CO2 mmol/L 25.1 26.7 23.0 23.0  --  25.2 22.8   BUN mg/dL 22 22 14 10  --  12 11   CREATININE mg/dL 0.69 0.75 0.89 0.69  --  0.77 0.73   CALCIUM mg/dL 8.3 8.6 8.8 8.2  --  8.7 8.4   AST (SGOT) U/L  --   --  26  --   --   --   --    ALT (SGPT) U/L  --   --  57*  --   --   --   --  " "  ANION GAP mmol/L 10.9 9.3 13.0 11.0  --  9.8 9.2   ALBUMIN g/dL  --   --  2.7*  --   --   --   --                  Results from last 7 days   Lab Units 05/05/25  0452 05/04/25  0830 05/03/25  0530 05/02/25  0503 05/01/25  0526 04/30/25  0349 04/29/25  0259   WBC 10*3/mm3 23.58* 27.53* 26.34* 23.19* 15.18* 15.54* 14.35*   HEMOGLOBIN g/dL 11.1* 10.9* 10.4* 11.1* 10.2* 10.5* 10.3*   HEMATOCRIT % 36.2 35.3 35.2 36.1 32.8* 33.3* 32.5*   PLATELETS 10*3/mm3 159 164 174 162 119* 113* 108*   MCV fL 100.0* 100.9* 102.6* 99.2* 97.9* 99.4* 97.0     Results from last 7 days   Lab Units 05/05/25  0452 05/04/25  0830 05/03/25  0530   INR  3.37* 3.37* 3.64*               Invalid input(s): \"LDLCALC\"          No results found for: \"POCGLU\"              Results from last 7 days   Lab Units 05/01/25  1758   COVID19  Not Detected   ADENOVIRUS DETECTION BY PCR  Not Detected   CORONAVIRUS 229E  Not Detected   CORONAVIRUS HKU1  Not Detected   CORONAVIRUS NL63  Not Detected   CORONAVIRUS OC43  Not Detected   HUMAN METAPNEUMOVIRUS  Not Detected   HUMAN RHINOVIRUS/ENTEROVIRUS  Not Detected   INFLUENZA B PCR  Not Detected   PARAINFLUENZA 1  Not Detected   PARAINFLUENZA VIRUS 2  Not Detected   PARAINFLUENZA VIRUS 3  Not Detected   PARAINFLUENZA VIRUS 4  Not Detected   BORDETELLA PERTUSSIS PCR  Not Detected   BORDETELLA PARAPERTUSSIS PCR  Not Detected   CHLAMYDOPHILA PNEUMONIAE PCR  Not Detected   MYCOPLAMA PNEUMO PCR  Not Detected   RSV, PCR  Not Detected               Imaging:   Imaging Results (All)               I reviewed the patient's new clinical results.  I personally viewed and interpreted the patient's imaging results:        Medication Review:   budesonide, 0.5 mg, Nebulization, BID - RT  calcium 500 mg vitamin D 5 mcg (200 UT), 1 tablet, Oral, BID  carvedilol, 3.125 mg, Oral, BID With Meals  cefTRIAXone, 2,000 mg, Intravenous, Q24H  cetirizine, 10 mg, Oral, BID  cholecalciferol, 1,000 Units, Oral, Daily  doxycycline, 100 mg, Oral, " Q12H  fluticasone, 1 spray, Nasal, Daily  furosemide, 20 mg, Oral, Daily  guaiFENesin, 1,200 mg, Oral, Q12H  ipratropium-albuterol, 3 mL, Nebulization, Q6H While Awake - RT  Menthol-Zinc Oxide, 1 Application, Topical, Q12H  miconazole, 1 Application, Topical, Q12H  montelukast, 10 mg, Oral, Nightly  oxybutynin XL, 10 mg, Oral, BID  predniSONE, 40 mg, Oral, Daily With Breakfast  rosuvastatin, 20 mg, Oral, Nightly  sodium chloride, 4 mL, Nebulization, BID - RT        Pharmacy Consult,   Pharmacy to dose warfarin,         ASSESSMENT:   Severe persistent asthma with acute exacerbation  Cellulitis, on antibiotic  Obstructive sleep apnea on BiPAP  Diabetes mellitus  Hypogammaglobulinemia on IVIG  Dysphagia on modified diet  Chronically elevated left hemidiaphragm with difficulty clearing secretions and difficulty recovering from respiratory infection requiring bronchoscopies on prior occasions  Leukocytosis in part steroids induced    PLAN:  Patient is doing better, she is using percussion vest and she is already in the process to have 1 set up at home, she was already approved and [employer] is the company that was trying to get hold of her for the last 2 weeks however they could not get hold of her because the patient was in the hospital to rule out that time  Please call [employer] at [phone] and PICC option #4 to arrange for a home visit after discharge  No opposition for discharge to rehab or home with assistance from the pulmonary standpoint  Discussed with patient and with the primary team  Labs/Notes/films were independently reviewed and pertinent results are summarized above  The copied texts in this note were reviewed and they are accurate as of 05/05/25    Disposition: Per primary team    Juno Coburn MD  05/05/25  10:08 EDT        Dictated utilizing Dragon dictation

## 2025-05-13 ENCOUNTER — HOSPITAL ENCOUNTER (INPATIENT)
Facility: HOSPITAL | Age: OVER 89
LOS: 2 days | Discharge: HOME | DRG: 605 | End: 2025-05-17
Attending: EMERGENCY MEDICINE | Admitting: STUDENT IN AN ORGANIZED HEALTH CARE EDUCATION/TRAINING PROGRAM
Payer: MEDICARE

## 2025-05-13 ENCOUNTER — APPOINTMENT (OUTPATIENT)
Dept: CT IMAGING | Facility: HOSPITAL | Age: OVER 89
DRG: 605 | End: 2025-05-13
Payer: MEDICARE

## 2025-05-13 DIAGNOSIS — S81.812A SKIN TEAR OF LEFT LOWER LEG WITHOUT COMPLICATION, INITIAL ENCOUNTER: ICD-10-CM

## 2025-05-13 DIAGNOSIS — R79.1 SUPRATHERAPEUTIC INR: ICD-10-CM

## 2025-05-13 DIAGNOSIS — S01.01XA LACERATION OF SCALP, INITIAL ENCOUNTER: Primary | ICD-10-CM

## 2025-05-13 PROCEDURE — 99285 EMERGENCY DEPT VISIT HI MDM: CPT

## 2025-05-13 PROCEDURE — 72125 CT NECK SPINE W/O DYE: CPT

## 2025-05-13 PROCEDURE — 70450 CT HEAD/BRAIN W/O DYE: CPT

## 2025-05-13 PROCEDURE — 0HQ0XZZ REPAIR SCALP SKIN, EXTERNAL APPROACH: ICD-10-PCS | Performed by: EMERGENCY MEDICINE

## 2025-05-14 PROBLEM — S22.000A THORACIC COMPRESSION FRACTURE, CLOSED, INITIAL ENCOUNTER: Status: ACTIVE | Noted: 2025-05-14

## 2025-05-14 PROBLEM — S09.90XA CLOSED HEAD INJURY: Status: ACTIVE | Noted: 2025-05-14

## 2025-05-14 LAB
ANION GAP SERPL CALCULATED.3IONS-SCNC: 9.3 MMOL/L (ref 5–15)
BUN SERPL-MCNC: 24 MG/DL (ref 8–23)
BUN/CREAT SERPL: 39.3 (ref 7–25)
CALCIUM SPEC-SCNC: 7.7 MG/DL (ref 8.2–9.6)
CHLORIDE SERPL-SCNC: 102 MMOL/L (ref 98–107)
CO2 SERPL-SCNC: 27.7 MMOL/L (ref 22–29)
CREAT SERPL-MCNC: 0.61 MG/DL (ref 0.57–1)
DEPRECATED RDW RBC AUTO: 48.3 FL (ref 37–54)
EGFRCR SERPLBLD CKD-EPI 2021: 85.1 ML/MIN/1.73
ERYTHROCYTE [DISTWIDTH] IN BLOOD BY AUTOMATED COUNT: 13.7 % (ref 12.3–15.4)
GLUCOSE BLDC GLUCOMTR-MCNC: 125 MG/DL (ref 70–130)
GLUCOSE BLDC GLUCOMTR-MCNC: 184 MG/DL (ref 70–130)
GLUCOSE BLDC GLUCOMTR-MCNC: 69 MG/DL (ref 70–130)
GLUCOSE BLDC GLUCOMTR-MCNC: 76 MG/DL (ref 70–130)
GLUCOSE BLDC GLUCOMTR-MCNC: 98 MG/DL (ref 70–130)
GLUCOSE SERPL-MCNC: 73 MG/DL (ref 65–99)
HCT VFR BLD AUTO: 38.6 % (ref 34–46.6)
HGB BLD-MCNC: 12 G/DL (ref 12–15.9)
INR PPP: 5.06 (ref 0.9–1.1)
INR PPP: 5.23 (ref 0.9–1.1)
MCH RBC QN AUTO: 30.3 PG (ref 26.6–33)
MCHC RBC AUTO-ENTMCNC: 31.1 G/DL (ref 31.5–35.7)
MCV RBC AUTO: 97.5 FL (ref 79–97)
PLATELET # BLD AUTO: 145 10*3/MM3 (ref 140–450)
PMV BLD AUTO: 11.2 FL (ref 6–12)
POTASSIUM SERPL-SCNC: 3.4 MMOL/L (ref 3.5–5.2)
PROTHROMBIN TIME: 47.9 SECONDS (ref 11.7–14.2)
PROTHROMBIN TIME: 49.2 SECONDS (ref 11.7–14.2)
RBC # BLD AUTO: 3.96 10*6/MM3 (ref 3.77–5.28)
SODIUM SERPL-SCNC: 139 MMOL/L (ref 136–145)
WBC NRBC COR # BLD AUTO: 42.04 10*3/MM3 (ref 3.4–10.8)

## 2025-05-14 PROCEDURE — 94640 AIRWAY INHALATION TREATMENT: CPT

## 2025-05-14 PROCEDURE — 94761 N-INVAS EAR/PLS OXIMETRY MLT: CPT

## 2025-05-14 PROCEDURE — 94760 N-INVAS EAR/PLS OXIMETRY 1: CPT

## 2025-05-14 PROCEDURE — 63710000001 PREDNISONE PER 5 MG: Performed by: STUDENT IN AN ORGANIZED HEALTH CARE EDUCATION/TRAINING PROGRAM

## 2025-05-14 PROCEDURE — 85610 PROTHROMBIN TIME: CPT | Performed by: EMERGENCY MEDICINE

## 2025-05-14 PROCEDURE — G0378 HOSPITAL OBSERVATION PER HR: HCPCS

## 2025-05-14 PROCEDURE — 63710000001 INSULIN LISPRO (HUMAN) PER 5 UNITS: Performed by: STUDENT IN AN ORGANIZED HEALTH CARE EDUCATION/TRAINING PROGRAM

## 2025-05-14 PROCEDURE — 82948 REAGENT STRIP/BLOOD GLUCOSE: CPT

## 2025-05-14 PROCEDURE — 85610 PROTHROMBIN TIME: CPT | Performed by: NURSE PRACTITIONER

## 2025-05-14 PROCEDURE — 36415 COLL VENOUS BLD VENIPUNCTURE: CPT | Performed by: NURSE PRACTITIONER

## 2025-05-14 PROCEDURE — 85027 COMPLETE CBC AUTOMATED: CPT | Performed by: NURSE PRACTITIONER

## 2025-05-14 PROCEDURE — 94799 UNLISTED PULMONARY SVC/PX: CPT

## 2025-05-14 PROCEDURE — 25010000002 LIDOCAINE 1 % SOLUTION: Performed by: EMERGENCY MEDICINE

## 2025-05-14 PROCEDURE — 80048 BASIC METABOLIC PNL TOTAL CA: CPT | Performed by: NURSE PRACTITIONER

## 2025-05-14 PROCEDURE — 87040 BLOOD CULTURE FOR BACTERIA: CPT | Performed by: STUDENT IN AN ORGANIZED HEALTH CARE EDUCATION/TRAINING PROGRAM

## 2025-05-14 RX ORDER — ACETAMINOPHEN 650 MG/1
650 SUPPOSITORY RECTAL EVERY 4 HOURS PRN
Status: DISCONTINUED | OUTPATIENT
Start: 2025-05-14 | End: 2025-05-17 | Stop reason: HOSPADM

## 2025-05-14 RX ORDER — DEXTROSE MONOHYDRATE 25 G/50ML
25 INJECTION, SOLUTION INTRAVENOUS
Status: DISCONTINUED | OUTPATIENT
Start: 2025-05-14 | End: 2025-05-17 | Stop reason: HOSPADM

## 2025-05-14 RX ORDER — SODIUM CHLORIDE FOR INHALATION 7 %
4 VIAL, NEBULIZER (ML) INHALATION 2 TIMES DAILY
Status: DISCONTINUED | OUTPATIENT
Start: 2025-05-14 | End: 2025-05-17 | Stop reason: HOSPADM

## 2025-05-14 RX ORDER — GUAIFENESIN 600 MG/1
1200 TABLET, EXTENDED RELEASE ORAL EVERY 12 HOURS SCHEDULED
Status: DISCONTINUED | OUTPATIENT
Start: 2025-05-14 | End: 2025-05-17 | Stop reason: HOSPADM

## 2025-05-14 RX ORDER — INSULIN LISPRO 100 [IU]/ML
2-7 INJECTION, SOLUTION INTRAVENOUS; SUBCUTANEOUS
Status: DISCONTINUED | OUTPATIENT
Start: 2025-05-14 | End: 2025-05-14

## 2025-05-14 RX ORDER — PREDNISONE 5 MG/1
2.5 TABLET ORAL
Status: COMPLETED | OUTPATIENT
Start: 2025-05-16 | End: 2025-05-17

## 2025-05-14 RX ORDER — SODIUM CHLORIDE 0.9 % (FLUSH) 0.9 %
10 SYRINGE (ML) INJECTION AS NEEDED
Status: DISCONTINUED | OUTPATIENT
Start: 2025-05-14 | End: 2025-05-17 | Stop reason: HOSPADM

## 2025-05-14 RX ORDER — ROSUVASTATIN CALCIUM 20 MG/1
20 TABLET, COATED ORAL NIGHTLY
Status: DISCONTINUED | OUTPATIENT
Start: 2025-05-14 | End: 2025-05-17 | Stop reason: HOSPADM

## 2025-05-14 RX ORDER — CARVEDILOL 3.12 MG/1
3.12 TABLET ORAL 2 TIMES DAILY WITH MEALS
Status: DISCONTINUED | OUTPATIENT
Start: 2025-05-14 | End: 2025-05-17 | Stop reason: HOSPADM

## 2025-05-14 RX ORDER — ONDANSETRON 4 MG/1
4 TABLET, ORALLY DISINTEGRATING ORAL EVERY 6 HOURS PRN
Status: DISCONTINUED | OUTPATIENT
Start: 2025-05-14 | End: 2025-05-17 | Stop reason: HOSPADM

## 2025-05-14 RX ORDER — ARFORMOTEROL TARTRATE 15 UG/2ML
15 SOLUTION RESPIRATORY (INHALATION)
Status: DISCONTINUED | OUTPATIENT
Start: 2025-05-14 | End: 2025-05-17 | Stop reason: HOSPADM

## 2025-05-14 RX ORDER — ACETAMINOPHEN 325 MG/1
650 TABLET ORAL EVERY 4 HOURS PRN
Status: DISCONTINUED | OUTPATIENT
Start: 2025-05-14 | End: 2025-05-17 | Stop reason: HOSPADM

## 2025-05-14 RX ORDER — FUROSEMIDE 20 MG/1
20 TABLET ORAL DAILY
Status: DISCONTINUED | OUTPATIENT
Start: 2025-05-14 | End: 2025-05-17 | Stop reason: HOSPADM

## 2025-05-14 RX ORDER — ACETAMINOPHEN 160 MG/5ML
650 SOLUTION ORAL EVERY 4 HOURS PRN
Status: DISCONTINUED | OUTPATIENT
Start: 2025-05-14 | End: 2025-05-17 | Stop reason: HOSPADM

## 2025-05-14 RX ORDER — NITROGLYCERIN 0.4 MG/1
0.4 TABLET SUBLINGUAL
Status: DISCONTINUED | OUTPATIENT
Start: 2025-05-14 | End: 2025-05-17 | Stop reason: HOSPADM

## 2025-05-14 RX ORDER — LIDOCAINE HYDROCHLORIDE 10 MG/ML
10 INJECTION, SOLUTION INFILTRATION; PERINEURAL ONCE
Status: COMPLETED | OUTPATIENT
Start: 2025-05-14 | End: 2025-05-14

## 2025-05-14 RX ORDER — PREDNISONE 5 MG/1
5 TABLET ORAL
Status: COMPLETED | OUTPATIENT
Start: 2025-05-14 | End: 2025-05-15

## 2025-05-14 RX ORDER — ONDANSETRON 2 MG/ML
4 INJECTION INTRAMUSCULAR; INTRAVENOUS EVERY 6 HOURS PRN
Status: DISCONTINUED | OUTPATIENT
Start: 2025-05-14 | End: 2025-05-17 | Stop reason: HOSPADM

## 2025-05-14 RX ORDER — POTASSIUM CHLORIDE 1500 MG/1
40 TABLET, EXTENDED RELEASE ORAL EVERY 4 HOURS
Status: COMPLETED | OUTPATIENT
Start: 2025-05-14 | End: 2025-05-15

## 2025-05-14 RX ORDER — PREDNISONE 20 MG/1
20 TABLET ORAL
Status: DISCONTINUED | OUTPATIENT
Start: 2025-05-15 | End: 2025-05-14

## 2025-05-14 RX ORDER — IBUPROFEN 600 MG/1
1 TABLET ORAL
Status: DISCONTINUED | OUTPATIENT
Start: 2025-05-14 | End: 2025-05-14

## 2025-05-14 RX ORDER — CEPHALEXIN 500 MG/1
500 CAPSULE ORAL 3 TIMES DAILY
Status: DISCONTINUED | OUTPATIENT
Start: 2025-05-14 | End: 2025-05-17 | Stop reason: HOSPADM

## 2025-05-14 RX ORDER — ALBUTEROL SULFATE 0.83 MG/ML
2.5 SOLUTION RESPIRATORY (INHALATION) EVERY 4 HOURS
Status: DISCONTINUED | OUTPATIENT
Start: 2025-05-14 | End: 2025-05-15

## 2025-05-14 RX ORDER — INSULIN LISPRO 100 [IU]/ML
2-7 INJECTION, SOLUTION INTRAVENOUS; SUBCUTANEOUS
Status: DISCONTINUED | OUTPATIENT
Start: 2025-05-14 | End: 2025-05-17 | Stop reason: HOSPADM

## 2025-05-14 RX ORDER — BUDESONIDE 0.5 MG/2ML
0.5 INHALANT ORAL 2 TIMES DAILY
Status: DISCONTINUED | OUTPATIENT
Start: 2025-05-14 | End: 2025-05-17 | Stop reason: HOSPADM

## 2025-05-14 RX ORDER — CALCIUM CARBONATE 500 MG/1
2 TABLET, CHEWABLE ORAL 2 TIMES DAILY PRN
Status: DISCONTINUED | OUTPATIENT
Start: 2025-05-14 | End: 2025-05-17 | Stop reason: HOSPADM

## 2025-05-14 RX ORDER — NICOTINE POLACRILEX 4 MG
15 LOZENGE BUCCAL
Status: DISCONTINUED | OUTPATIENT
Start: 2025-05-14 | End: 2025-05-17 | Stop reason: HOSPADM

## 2025-05-14 RX ORDER — IBUPROFEN 600 MG/1
1 TABLET ORAL
Status: DISCONTINUED | OUTPATIENT
Start: 2025-05-14 | End: 2025-05-17 | Stop reason: HOSPADM

## 2025-05-14 RX ORDER — SODIUM CHLORIDE 0.9 % (FLUSH) 0.9 %
10 SYRINGE (ML) INJECTION EVERY 12 HOURS SCHEDULED
Status: DISCONTINUED | OUTPATIENT
Start: 2025-05-14 | End: 2025-05-17 | Stop reason: HOSPADM

## 2025-05-14 RX ORDER — IPRATROPIUM BROMIDE AND ALBUTEROL SULFATE 2.5; .5 MG/3ML; MG/3ML
3 SOLUTION RESPIRATORY (INHALATION)
Status: DISCONTINUED | OUTPATIENT
Start: 2025-05-14 | End: 2025-05-17 | Stop reason: HOSPADM

## 2025-05-14 RX ORDER — SODIUM CHLORIDE 9 MG/ML
40 INJECTION, SOLUTION INTRAVENOUS AS NEEDED
Status: DISCONTINUED | OUTPATIENT
Start: 2025-05-14 | End: 2025-05-17 | Stop reason: HOSPADM

## 2025-05-14 RX ADMIN — MENTHOL, ZINC OXIDE 1 APPLICATION: .44; 20.6 OINTMENT TOPICAL at 20:26

## 2025-05-14 RX ADMIN — Medication 10 ML: at 06:12

## 2025-05-14 RX ADMIN — INSULIN LISPRO 2 UNITS: 100 INJECTION, SOLUTION INTRAVENOUS; SUBCUTANEOUS at 21:34

## 2025-05-14 RX ADMIN — CEPHALEXIN 500 MG: 500 CAPSULE ORAL at 16:37

## 2025-05-14 RX ADMIN — PREDNISONE 5 MG: 5 TABLET ORAL at 18:53

## 2025-05-14 RX ADMIN — BUDESONIDE 0.5 MG: 0.5 INHALANT RESPIRATORY (INHALATION) at 20:01

## 2025-05-14 RX ADMIN — IPRATROPIUM BROMIDE AND ALBUTEROL SULFATE 3 ML: .5; 3 SOLUTION RESPIRATORY (INHALATION) at 20:00

## 2025-05-14 RX ADMIN — GUAIFENESIN 1200 MG: 600 TABLET, EXTENDED RELEASE ORAL at 20:20

## 2025-05-14 RX ADMIN — IPRATROPIUM BROMIDE AND ALBUTEROL SULFATE 3 ML: .5; 3 SOLUTION RESPIRATORY (INHALATION) at 15:22

## 2025-05-14 RX ADMIN — LIDOCAINE HYDROCHLORIDE 10 ML: 10 INJECTION, SOLUTION INFILTRATION; PERINEURAL at 00:27

## 2025-05-14 RX ADMIN — Medication 10 ML: at 11:10

## 2025-05-14 RX ADMIN — ROSUVASTATIN CALCIUM 20 MG: 20 TABLET, FILM COATED ORAL at 20:19

## 2025-05-14 RX ADMIN — CEPHALEXIN 500 MG: 500 CAPSULE ORAL at 20:17

## 2025-05-14 RX ADMIN — POTASSIUM CHLORIDE 40 MEQ: 1500 TABLET, EXTENDED RELEASE ORAL at 20:20

## 2025-05-14 RX ADMIN — ARFORMOTEROL TARTRATE 15 MCG: 15 SOLUTION RESPIRATORY (INHALATION) at 20:00

## 2025-05-14 RX ADMIN — Medication 4 ML: at 20:26

## 2025-05-14 NOTE — CASE MANAGEMENT/SOCIAL WORK
Discharge Planning Assessment  [facility]     Patient Name: Caitlyn Longoria  MRN: [id number]  Today's Date: 5/14/2025    Admit Date: 5/13/2025        Discharge Needs Assessment    No documentation.                  Discharge Plan       Row Name 05/14/25 0956       Plan    Plan Comments CCP spoke with LatonyaAnthonyravi who states patient normally lives at their independent living but was admitted to [facility] from skilled rehab. Per Latonya/Liz, patient can return skilled even if she is in observation status under her Medicare. RADHA PRESLEY                  Continued Care and Services - Admitted Since 5/13/2025       Destination       Service Provider Request Status Services Address Phone Fax Patient Preferred    [facility] Accepted -- [address] [place] [address] [phone] --                  Selected Continued Care - Prior Encounters Includes continued care and service providers with selected services from prior encounters from 2/12/2025 to 5/14/2025      Discharged on 5/5/2025 Admission date: 4/25/2025 - Discharge disposition: Skilled Nursing Facility (DC - External)      Destination       Service Provider Services Address Phone Fax Patient Preferred    [facility] [address] [facility] [address], [place] [address] [phone] --                             Demographic Summary    No documentation.                  Functional Status    No documentation.                  Psychosocial    No documentation.                  Abuse/Neglect    No documentation.                  Legal    No documentation.                  Substance Abuse    No documentation.                  Patient Forms    No documentation.                     RADHA Pedroza

## 2025-05-14 NOTE — ED PROVIDER NOTES
EMERGENCY DEPARTMENT ENCOUNTER    Room number:  21/21  Date Seen:  5/14/2025  PCP:  Hollie Donnelly MD    Laboratory Results:  Recent Results (from the past 24 hours)   Protime-INR    Collection Time: 05/14/25 12:18 AM    Specimen: Arm, Right; Blood   Result Value Ref Range    Protime 47.9 (C) 11.7 - 14.2 Seconds    INR 5.06 (C) 0.90 - 1.10     I reviewed the above results.    Radiology:  CT Cervical Spine Without Contrast  Result Date: 5/13/2025  CT OF THE CERVICAL SPINE  HISTORY: Fall  COMPARISON: April 14, 2024  TECHNIQUE: Axial CT imaging was obtained through the cervical spine. Coronal and sagittal reformatted images were obtained.  FINDINGS: No acute fracture or subluxation of the cervical spine is seen. The patient is noted to have compression deformity of T4. This was not clearly identified on exam from January 6, 2025. It remains age indeterminate. There is anterolisthesis of C6 on C7, which was also present on prior exam. Intervertebral disc space narrowing is most significant at C4-C5 and C5-C6. There is no prevertebral soft tissue swelling. Advanced degenerative changes are noted at the atlantoaxial junction. Images through the lung apices demonstrate some mild biapical scarring. There is minimal canal stenosis and neural foraminal narrowing.       1. No acute fracture or subluxation of the cervical spine is seen. 2. The patient does have compression deformity of T4, which is age indeterminate, although favored to be at least subacute.  Radiation dose reduction techniques were utilized, including automated exposure control and exposure modulation based on body size.   This report was finalized on 5/13/2025 11:36 PM by Dr. Heidi Garza M.D on Workstation: [id number]      CT Head Without Contrast  Result Date: 5/13/2025  CT OF THE HEAD WITHOUT CONTRAST  HISTORY: Fall  COMPARISON: January 6, 2025  TECHNIQUE: Axial CT imaging was obtained through the brain. No IV contrast was administered.  FINDINGS:  No acute intracranial hemorrhage is seen. There is diffuse atrophy. There is periventricular and deep white matter microangiopathic change. There is no midline shift or mass effect. No calvarial fracture is seen. Small mucous retention cyst is noted within the right maxillary sinus. There is a left parieto-occipital scalp hematoma.      No acute intracranial findings.  Radiation dose reduction techniques were utilized, including automated exposure control and exposure modulation based on body size.   This report was finalized on 5/13/2025 11:29 PM by Dr. Heidi Garza M.D on Workstation: [id number]      I reviewed the above results    Medications ordered in ED:  Medications   lidocaine (XYLOCAINE) 1 % injection 10 mL (10 mL Injection Given by Other 5/14/25 0027)       ED Course as of 05/14/25 0245   Wed May 14, 2025   0120 01:20 EDT  Patient fall from Rocio lift.  Patient had scalp laceration that has been repaired.  CT scan of the head and C-spine are negative.  Patient does have skin tear. [SL]   0218 INR(!!): 5.06 [CC]   0218 Rechecked on patient: She is alert and oriented.  She answers all questions appropriately.  Has a nonfocal neurologic exam.  Patient will need to be admitted for observation for routine neurologic checks.  Patient is agreeable. [CC]   0239 Spoke with GILSON Mosqueda with [facility].  Reviewed history, exam, results, treatments.  She agrees admit the patient to Dr. Messer.   [CC]      ED Course User Index  [CC] Aure Rojas PA-C  [SL] Guillermo Collins MD       Diagnosis:  Final diagnoses:   Laceration of scalp, initial encounter   Skin tear of left lower leg without complication, initial encounter       Provider attestation:  I personally reviewed the past medical history, past surgical history, social history, family history, current medications, and allergies as they appear in the chart.    The patient was seen and examined by myself and Dr. Messer, who agree with plan.       Aure Rojas PA-C  05/14/25 0245

## 2025-05-14 NOTE — NURSING NOTE
05/14/25 1410   Wound 02/26/25 0000 sacral spine pressure injury   Placement Date/Time: 02/26/25 0000   Location: sacral spine  Primary Wound Type: Pressure Injury  Type: pressure injury  Present on Original Admission: Yes   Pressure Injury Stage 2   Dressing Appearance moist drainage   Confirmed Empty Wound Bed Yes, finger sweep performed   Base moist;nonblanchable;red   Periwound redness   Periwound Temperature warm   Periwound Skin Turgor soft   Edges open   Wound Length (cm) 0.7 cm   Wound Width (cm) 0.3 cm   Wound Depth (cm) 0.2 cm   Wound Surface Area (cm^2) 0.16 cm^2   Wound Volume (cm^3) 0.022 cm^3   Drainage Characteristics/Odor serosanguineous   Drainage Amount scant   Skin Interventions   Pressure Reduction Devices specialty bed utilized  (Centrella Pro+)   Pressure Reduction Techniques frequent weight shift encouraged;heels elevated off bed;positioned off wounds;pressure points protected     Reason for Visit: CWCN: Consult received from the floor nurse about a skin injury to the sacrum and lower leg.  The patient is alert and able to turn with assistance, known from the previous admission.  Upon assessment, we were able to see partial-thickness skin on the Coccyx, POA, wound bed described above.  Also multiple bruises were noted in areas with intact skin and others with partial tissue loss consistent with a skin tear.  In addition slight rash, red in color and itchy according to the patient was observed on bilateral breasts and abdominal folds, possibly related to a fungal infection.  Bilateral Heels and Ankles  were redness but blanchable.   Rn at bedside.  Treatment Plan/Recommendations: Calmoseptine ointment was ordered to Sacrococcygeal/Buttocks area.  Miconazole powder to abdominal and breast fold.  Vaseline gauze to the open sites on bilateral upper and lower extremities.  Protective padding to facilitate cushion should be used to bilateral heels, they should be elevated off bed.  Wound care  order and pressure ulcer standing measures have been placed into [facility]  Specialty mattress, Centrella Pro+ for adequate pressure redistribution and pressure relief will be  requested, Rn  will call.  Wound Team Follow up Plan: WCN team will follow up according to her need

## 2025-05-14 NOTE — CASE MANAGEMENT/SOCIAL WORK
Continued Stay Note  [facility]     Patient Name: Caitlyn Longoria  MRN: [id number]  Today's Date: 5/14/2025    Admit Date: 5/13/2025    Plan: Plans discharge to [facility] when medically ready.   Discharge Plan       Row Name 05/14/25 1544       Plan    Plan Plans discharge to [facility] when medically ready.    Patient/Family in Agreement with Plan yes    Plan Comments Per Latonya, patient can discharge to [facility] when medically ready. Packet: office  Pharmacy: updated  Transport: need to arrange......PRC                   Discharge Codes    No documentation.                 Expected Discharge Date and Time       Expected Discharge Date Expected Discharge Time    May 17, 2025               Renetta Minor RN

## 2025-05-14 NOTE — DISCHARGE PLACEMENT REQUEST
"Delfin Longoria (89 y.o. Female)       Date of Birth   1934    Social Security Number       Address   [address] [place] [address] [facility] [place] [address]    Home Phone   [phone]    MRN   [id number]       Sikhism   Scientology    Marital Status   Single                            Admission Date   5/13/2025    Admission Type   Emergency    Admitting Provider   Isacc Garcia MD    Attending Provider   Isacc Garcia MD    Department, Room/Bed   [address], P786/1       Discharge Date       Discharge Disposition       Discharge Destination                                 Attending Provider: Isacc Garcia MD    Allergies: Accupril [Quinapril Hcl], Ahist [Chlorpheniramine], Clarithromycin, Esomeprazole, Latex, Levalbuterol, Levocetirizine, Lipitor [Atorvastatin], Omeprazole, Pravachol [Pravastatin], Sulindac, Valdecoxib, Chlorcyclizine, Chlorpheniramine-phenylephrine, Diclofenac Sodium, Diphenhydramine, Lodine [Etodolac], Sulfa Antibiotics    Isolation: None   Infection: None   Code Status: CPR    Ht: 160 cm (63\")   Wt: 81.1 kg (178 lb 14.4 oz)    Admission Cmt: None   Principal Problem: Closed head injury [S09.90XA]                   Active Insurance as of 5/13/2025       Primary Coverage       Payor Plan Insurance Group Employer/Plan Group    MEDICARE MEDICARE A & B        Payor Plan Address Payor Plan Phone Number Payor Plan Fax Number Effective Dates    [address] [phone]  10/1/1999 - None Entered    [place] [address]         Subscriber Name Subscriber Birth Date Member ID       DELFIN LONGORIA 1934 [id number]               Secondary Coverage       Payor Plan Insurance Group Employer/Plan Group    [facility] HEALTH CARE OPTIONS PLAN F       Payor Plan Address Payor Plan Phone Number Payor Plan Fax Number Effective Dates    [facility] [phone]  1/1/2015 - None Entered    [address]       [facility] [address]         Subscriber Name " Subscriber Birth Date Member ID       DELFIN LONGORIA 1934 [id number]               Tertiary Coverage       Payor Plan Insurance Group Employer/Plan Group    [employer] MISC [employer] HE[employer] NGN       Coverage Address Coverage Phone Number Coverage Fax Number Effective Dates    [address] [phone]  10/28/2024 - None Entered    [facility] [address]         Subscriber Name Subscriber Birth Date Member ID       DELFIN LONGORIA 1934 [id number]                     Emergency Contacts        (Rel.) Home Phone Work Phone Mobile Phone    Zoey Adair (HCS) (Relative) [phone] -- [phone]    Trae Longoria (HCS) (Brother) [phone] -- [phone]    Inez Longoria (Relative) [phone] -- [phone]

## 2025-05-14 NOTE — PLAN OF CARE
Problem: Adult Inpatient Plan of Care  Goal: Plan of Care Review  Outcome: Progressing  Flowsheets (Taken 5/14/2025 1819)  Progress: improving  Plan of Care Reviewed With: patient  Goal: Patient-Specific Goal (Individualized)  Outcome: Progressing  Goal: Absence of Hospital-Acquired Illness or Injury  Outcome: Progressing  Intervention: Identify and Manage Fall Risk  Recent Flowsheet Documentation  Taken 5/14/2025 1600 by Charles Santo, RN  Safety Promotion/Fall Prevention:   safety round/check completed   assistive device/personal items within reach  Intervention: Prevent Skin Injury  Recent Flowsheet Documentation  Taken 5/14/2025 1600 by Charles Santo RN  Body Position:   right   turned   tilted  Intervention: Prevent Infection  Recent Flowsheet Documentation  Taken 5/14/2025 1600 by Charles Santo RN  Infection Prevention:   personal protective equipment utilized   environmental surveillance performed  Goal: Optimal Comfort and Wellbeing  Outcome: Progressing  Goal: Readiness for Transition of Care  Outcome: Progressing   Goal Outcome Evaluation:  Plan of Care Reviewed With: patient        Progress: improving     Patient arrives at the unit, monitor and O2 connected. Patient feels ok, denies chest pain, SOB. No pain. No changes during shift. Pending results of blood cultures.

## 2025-05-14 NOTE — CONSULTS
[facility] NEUROSURGERY CONSULT NOTE    Patient name: Caitlyn Longoria  Referring Provider: KRISTIN Tate  Reason for Consultation: Subacute T4 VCF    Patient Care Team:  Hollie Donnelly MD as PCP - General (Geriatric Medicine)  Pao Levi RPH as Pharmacist  Alexander Valiente PharmD as Pharmacist (Pharmacy)  Zenaida Suarez MD as Referring Physician (Internal Medicine)  Lucien Larkin MD as Consulting Physician (Hematology and Oncology)    Chief complaint: Fall, head injury, neck pain    Subjective .     History of present illness:    Patient is a 89 y.o.  female with medical history of DM 2, PAF on Coumadin, HTN, CKD, CHF, COPD.  Patient was sent to the ER after reportedly falling from the [employer] lift during a transfer.  Patient states she does not remember falling.  She does report mild neck pain.  She denies headache, nausea, vomiting, visual changes, extremity weakness.      History  PAST MEDICAL HISTORY  Past Medical History:   Diagnosis Date    Aortic calcification     mild, on echo 12/17/2017    Aortic regurgitation     Trace    Asthma     Atrial fibrillation     CAD (coronary artery disease)     Carpal tunnel syndrome of left wrist     Chronic combined systolic and diastolic congestive heart failure     CKD (chronic kidney disease) stage 3, GFR 30-59 ml/min     COPD (chronic obstructive pulmonary disease)     Coronary artery disease involving native coronary artery of native heart with angina pectoris     Disc degeneration, lumbar     Diverticulosis     DM type 2 (diabetes mellitus, type 2)     Dyslipidemia     GERD (gastroesophageal reflux disease)     History of aneurysm     right femoral artery s/p LHC    History of blood transfusion     History of fracture     History of heart attack     History of vitamin D deficiency     Hyperlipidemia     Hypertension     Leukemia     Mild mitral regurgitation     Mitral annular calcification     12/8/2017- echo, moderate    Osteopenia     PAF  (paroxysmal atrial fibrillation)     Peripheral neuropathy     Skin cancer     Left hand    Sleep apnea     bipap    SSS (sick sinus syndrome)     Stroke (cerebrum)     TIA (transient ischemic attack) 2017    Tricuspid regurgitation     Trace       PAST SURGICAL HISTORY  Past Surgical History:   Procedure Laterality Date    BRONCHOSCOPY Bilateral 10/6/2020    Procedure: BRONCHOSCOPY with BILATERAL LUNG washings;  Surgeon: Juno Coburn MD;  Location: [facility] ENDOSCOPY;  Service: Pulmonary;  Laterality: Bilateral;  PRE: purulent bronchitis  POST: PURULENT BRONCHITIS    BRONCHOSCOPY Bilateral 10/9/2020    Procedure: BRONCHOSCOPY with washing;  Surgeon: Juno Coburn MD;  Location: [facility] ENDOSCOPY;  Service: Pulmonary;  Laterality: Bilateral;  pre/post - mucous plug      CARDIAC CATHETERIZATION      CARDIAC ELECTROPHYSIOLOGY PROCEDURE N/A 2/7/2020    Procedure: PPM generator change - dual  medtronic;  Surgeon: Kiel Field MD;  Location: [facility] CATH INVASIVE LOCATION;  Service: Cardiology;  Laterality: N/A;    CHOLECYSTECTOMY      CORONARY STENT PLACEMENT      ENDOSCOPY N/A 9/14/2022    Procedure: ESOPHAGOGASTRODUODENOSCOPY with 54fr main dilatation;  Surgeon: Enio Cota MD;  Location: [facility] ENDOSCOPY;  Service: Gastroenterology;  Laterality: N/A;  pre - dysphagia  post - s/p dilatation, watermelon stomach    HERNIA REPAIR      hital hernia    HYSTERECTOMY      PACEMAKER IMPLANTATION      REPLACEMENT TOTAL KNEE Bilateral        FAMILY HISTORY  Family History   Problem Relation Age of Onset    Heart disease Mother     Hypertension Mother     Colon polyps Mother     Heart disease Father     Hypertension Father     Stroke Father     Hypertension Brother     Heart disease Brother     Diabetes Niece     Colon cancer Sister     Hypertension Sister     Hypertension Daughter     Hypertension Son     Hypertension Maternal Aunt     Hypertension Maternal Grandmother     Hypertension Maternal Grandfather      Hypertension Paternal Grandmother     Hypertension Paternal Grandfather        SOCIAL HISTORY  Social History     Tobacco Use    Smoking status: Never     Passive exposure: Never    Smokeless tobacco: Never    Tobacco comments:     caffeine use- soda   Vaping Use    Vaping status: Never Used   Substance Use Topics    Alcohol use: Never    Drug use: No       Allergies:  Accupril [quinapril hcl], Ahist [chlorpheniramine], Clarithromycin, Esomeprazole, Latex, Levalbuterol, Levocetirizine, Lipitor [atorvastatin], Omeprazole, Pravachol [pravastatin], Sulindac, Valdecoxib, Chlorcyclizine, Chlorpheniramine-phenylephrine, Diclofenac sodium, Diphenhydramine, Lodine [etodolac], and Sulfa antibiotics    MEDICATIONS:  Medications Prior to Admission   Medication Sig Dispense Refill Last Dose/Taking    acetaminophen (TYLENOL) 325 MG tablet Take 2 tablets by mouth Every 4 (Four) Hours As Needed for Mild Pain .       Acetylcysteine (NAC PO) Take 1,000 mg by mouth 2 (Two) Times a Day.       albuterol (PROVENTIL) (2.5 MG/3ML) 0.083% nebulizer solution Take 2.5 mg by nebulization Every 4 (Four) Hours.       albuterol sulfate  (90 Base) MCG/ACT inhaler Inhale 2 puffs Every 4 (Four) Hours As Needed for Wheezing.       arformoterol (BROVANA) 15 MCG/2ML nebulizer solution Take 2 mL by nebulization 2 (Two) Times a Day. 120 mL 0     Benralizumab (FASENRA SC) Inject 1 dose under the skin into the appropriate area as directed Every 2 (Two) Months. Every 8 weeks       budesonide (PULMICORT) 0.5 MG/2ML nebulizer solution Take 2 mL by nebulization 2 (Two) Times a Day.       Calcium Carbonate-Vitamin D (calcium 500 mg vitamin D 5 mcg, 200 UT,) 500-5 MG-MCG tablet per tablet Take 1 tablet by mouth 2 (Two) Times a Day. 60 tablet 3     carvedilol (COREG) 3.125 MG tablet Take 1 tablet by mouth 2 (Two) Times a Day With Meals.       cephalexin (KEFLEX) 500 MG capsule Take 1 capsule by mouth 3 (Three) Times a Day for 90 days. 270 capsule 3      Cetirizine HCl 10 MG capsule Take 1 capsule by mouth 2 (Two) Times a Day.       Cholecalciferol 25 MCG (1000 UT) tablet Take 1 tablet by mouth Daily.       fluticasone (FLONASE) 50 MCG/ACT nasal spray Administer 1 spray into the nostril(s) as directed by provider Daily.       furosemide (LASIX) 20 MG tablet Take 1 tablet by mouth Daily.       glipizide (GLUCOTROL) 5 MG tablet Take 1 tablet by mouth Daily With Breakfast.       guaiFENesin (MUCINEX) 600 MG 12 hr tablet Take 2 tablets by mouth Every 12 (Twelve) Hours.       ipratropium-albuterol (DUO-NEB) 0.5-2.5 mg/3 ml nebulizer Take 3 mL by nebulization 4 (Four) Times a Day. 360 mL 0     melatonin 5 MG tablet tablet Take 0.5 tablets by mouth At Night As Needed (sleep).       Menthol-Zinc Oxide 0.44-20.6 % ointment Apply 1 Application topically to the appropriate area as directed Every 12 (Twelve) Hours.       metFORMIN ER (GLUCOPHAGE-XR) 500 MG 24 hr tablet Take 1 tablet by mouth Daily With Breakfast.       miconazole (MICOTIN) 2 % powder Apply 1 Application topically to the appropriate area as directed Every 12 (Twelve) Hours.       montelukast (SINGULAIR) 10 MG tablet Take 1 tablet by mouth Every Night.       nystatin (MYCOSTATIN) 171163 UNIT/GM cream Apply 1 Application topically to the appropriate area as directed 2 (Two) Times a Day.       nystatin-triamcinolone (MYCOLOG II) 589942-4.1 UNIT/GM-% cream Apply 1 Application topically to the appropriate area as directed 2 (Two) Times a Day.       oxybutynin XL (DITROPAN-XL) 10 MG 24 hr tablet Take 1 tablet by mouth 2 (Two) Times a Day.       predniSONE (DELTASONE) 10 MG tablet Take 4 tablets by mouth Daily With Breakfast for 2 days, THEN 3 tablets Daily With Breakfast for 2 days, THEN 2 tablets Daily With Breakfast for 2 days, THEN 1 tablet Daily With Breakfast for 2 days.       rosuvastatin (CRESTOR) 20 MG tablet Take 1 tablet by mouth Every Night.       sodium chloride 7 % nebulizer solution nebulizer solution  Take 4 mL by nebulization 2 (Two) Times a Day.       warfarin (COUMADIN) 4 MG tablet Take one tablet by mouth on mon, wed, fri and take one-half of a tablet by mouth all other days or as directed 65 tablet 1      Current Facility-Administered Medications:     acetaminophen (TYLENOL) tablet 650 mg, 650 mg, Oral, Q4H PRN **OR** acetaminophen (TYLENOL) 160 MG/5ML oral solution 650 mg, 650 mg, Oral, Q4H PRN **OR** acetaminophen (TYLENOL) suppository 650 mg, 650 mg, Rectal, Q4H PRN, Jaylin Murcia APRN    albuterol (PROVENTIL) nebulizer solution 0.083% 2.5 mg/3mL, 2.5 mg, Nebulization, Q4H, Isacc Garcia MD    arformoterol (BROVANA) nebulizer solution 15 mcg, 15 mcg, Nebulization, BID - RT, Isacc Garcia MD    budesonide (PULMICORT) nebulizer solution 0.5 mg, 0.5 mg, Nebulization, BID, Isacc Garcia MD    calcium carbonate (TUMS) chewable tablet 500 mg (200 mg elemental), 2 tablet, Oral, BID PRN, Jaylin Murcia APRN    [Held by provider] carvedilol (COREG) tablet 3.125 mg, 3.125 mg, Oral, BID With Meals, Isacc Garcia MD    cephalexin (KEFLEX) capsule 500 mg, 500 mg, Oral, TID, Isacc Garcia MD    dextrose (D50W) (25 g/50 mL) IV injection 25 g, 25 g, Intravenous, Q15 Min PRN, Isacc Garcia MD    dextrose (D50W) (25 g/50 mL) IV injection 25 g, 25 g, Intravenous, Q15 Min PRN, Isacc Garcia MD    dextrose (GLUTOSE) oral gel 15 g, 15 g, Oral, Q15 Min PRN, Isacc Garcia MD    dextrose (GLUTOSE) oral gel 15 g, 15 g, Oral, Q15 Min PRN, Isacc Garcia MD    [Held by provider] furosemide (LASIX) tablet 20 mg, 20 mg, Oral, Daily, Isacc Garcia MD    glucagon (GLUCAGEN) injection 1 mg, 1 mg, Intramuscular, Q15 Min PRN, Isacc Garcia MD    guaiFENesin (MUCINEX) 12 hr tablet 1,200 mg, 1,200 mg, Oral, Q12H, Isacc Garcia MD    insulin lispro (HUMALOG/ADMELOG) injection 2-7 Units, 2-7 Units, Subcutaneous, 4x Daily AC & at Bedtime, Isacc Garcia MD    ipratropium-albuterol (DUO-NEB) nebulizer solution 3 mL, 3 mL,  Nebulization, 4x Daily - RT, Isacc Garcia MD, 3 mL at 05/14/25 1522    Menthol-Zinc Oxide 1 Application, 1 Application, Topical, Q12H, Isacc Garcia MD    miconazole (MICOTIN) 2 % powder 1 Application, 1 Application, Topical, Q12H, Isacc Garcia MD    nitroglycerin (NITROSTAT) SL tablet 0.4 mg, 0.4 mg, Sublingual, Q5 Min PRN, Isacc Garcia MD    ondansetron ODT (ZOFRAN-ODT) disintegrating tablet 4 mg, 4 mg, Oral, Q6H PRN **OR** ondansetron (ZOFRAN) injection 4 mg, 4 mg, Intravenous, Q6H PRN, Jaylin Murcia APRN    [START ON 5/15/2025] predniSONE (DELTASONE) tablet 20 mg, 20 mg, Oral, Daily With Breakfast, Isacc Garcia MD    rosuvastatin (CRESTOR) tablet 20 mg, 20 mg, Oral, Nightly, Isacc Garcia MD    sodium chloride 0.9 % flush 10 mL, 10 mL, Intravenous, Q12H, Jaylin Murcia APRN, 10 mL at 05/14/25 1110    sodium chloride 0.9 % flush 10 mL, 10 mL, Intravenous, PRN, Jaylin Murcia APRN    sodium chloride 0.9 % infusion 40 mL, 40 mL, Intravenous, PRN, Jaylin Murcia APRN    sodium chloride 7 % nebulizer solution nebulizer solution 4 mL, 4 mL, Nebulization, BID, Isacc Garcia MD      COMORBID CONDITIONS:  chronic kidney disease including stage, COPD, Diabetes Type 2, Heart failure, and Hypertension      Review of Systems  Review of Systems   Constitutional:  Negative for chills and fever.   Eyes:  Negative for visual disturbance.   Gastrointestinal:  Negative for nausea and vomiting.   Musculoskeletal:  Positive for neck pain. Negative for back pain.   Skin:  Positive for color change (BLE).   Neurological:  Positive for weakness (Generalized). Negative for dizziness and headaches.   Psychiatric/Behavioral:  The patient is not nervous/anxious.      Objective     Physical Exam  Physical Exam  Vitals reviewed.   Constitutional:       Comments: Chronically ill-appearing   Pulmonary:      Effort: Pulmonary effort is normal.   Musculoskeletal:      Cervical back: Normal.      Thoracic back:  Bony tenderness present.      Lumbar back: Normal.   Skin:     General: Skin is warm and dry.      Comments: Right lower extremity erythematous  Swelling noted to bilateral lower extremities  Bilateral knees wrapped in Kerlix  Left posterior scalp lap with staples       Neurological Exam  Mental Status  Awake, alert and oriented to person, place and time.    Motor  Normal muscle bulk throughout. Normal muscle tone.    Sensory  Sensation is intact to light touch, pinprick, vibration and proprioception in all four extremities.    Gait    Gait not tested due to fall risk.        Results Review:    LABS:    Admission on 05/13/2025   Component Date Value Ref Range Status    Protime 05/14/2025 47.9 (C)  11.7 - 14.2 Seconds Final    INR 05/14/2025 5.06 (C)  0.90 - 1.10 Final    Glucose 05/14/2025 73  65 - 99 mg/dL Final    BUN 05/14/2025 24 (H)  8 - 23 mg/dL Final    Creatinine 05/14/2025 0.61  0.57 - 1.00 mg/dL Final    Sodium 05/14/2025 139  136 - 145 mmol/L Final    Potassium 05/14/2025 3.4 (L)  3.5 - 5.2 mmol/L Final    Slight hemolysis detected by analyzer. Result may be falsely elevated.    Chloride 05/14/2025 102  98 - 107 mmol/L Final    CO2 05/14/2025 27.7  22.0 - 29.0 mmol/L Final    Calcium 05/14/2025 7.7 (L)  8.2 - 9.6 mg/dL Final    BUN/Creatinine Ratio 05/14/2025 39.3 (H)  7.0 - 25.0 Final    Anion Gap 05/14/2025 9.3  5.0 - 15.0 mmol/L Final    eGFR 05/14/2025 85.1  >60.0 mL/min/1.73 Final    WBC 05/14/2025 42.04 (C)  3.40 - 10.80 10*3/mm3 Final    RBC 05/14/2025 3.96  3.77 - 5.28 10*6/mm3 Final    Hemoglobin 05/14/2025 12.0  12.0 - 15.9 g/dL Final    Hematocrit 05/14/2025 38.6  34.0 - 46.6 % Final    MCV 05/14/2025 97.5 (H)  79.0 - 97.0 fL Final    MCH 05/14/2025 30.3  26.6 - 33.0 pg Final    MCHC 05/14/2025 31.1 (L)  31.5 - 35.7 g/dL Final    RDW 05/14/2025 13.7  12.3 - 15.4 % Final    RDW-SD 05/14/2025 48.3  37.0 - 54.0 fl Final    MPV 05/14/2025 11.2  6.0 - 12.0 fL Final    Platelets 05/14/2025 145  140 -  450 10*3/mm3 Final    Protime 05/14/2025 49.2 (C)  11.7 - 14.2 Seconds Final    INR 05/14/2025 5.23 (C)  0.90 - 1.10 Final    Glucose 05/14/2025 69 (L)  70 - 130 mg/dL Final    Glucose 05/14/2025 76  70 - 130 mg/dL Final    Glucose 05/14/2025 98  70 - 130 mg/dL Final    Glucose 05/14/2025 125  70 - 130 mg/dL Final       DIAGNOSTICS:  CT head:  No acute intercranial findings    CT cervical spine:  Age-indeterminate T4 compression deformity    Results Review:   I reviewed the patient's new clinical results.  I personally viewed  the patient's chart/imaging, it was also discussed with Dr. Dia    Vital Signs   Temp:  [98.1 °F (36.7 °C)-98.5 °F (36.9 °C)] 98.2 °F (36.8 °C)  Heart Rate:  [76-89] 81  Resp:  [16-18] 16  BP: ()/(50-72) 99/59      Assessment & Plan       Closed head injury    Type 2 diabetes mellitus    HLD (hyperlipidemia)    Coronary artery disease    HTN (hypertension)    CKD (chronic kidney disease), stage III    Chronic combined systolic (congestive) and diastolic (congestive) heart failure    CLL (chronic lymphocytic leukemia)    Anticoagulated on Coumadin    COPD (chronic obstructive pulmonary disease)    Sick sinus syndrome    Chronic HFrEF (heart failure with reduced ejection fraction)    Thoracic compression fracture, closed, initial encounter      Problem List Items Addressed This Visit    None  Visit Diagnoses         Laceration of scalp, initial encounter    -  Primary      Skin tear of left lower leg without complication, initial encounter        Relevant Medications    Menthol-Zinc Oxide 1 Application (Start on 5/14/2025  9:00 PM)    miconazole (MICOTIN) 2 % powder 1 Application (Start on 5/14/2025  9:00 PM)      Supratherapeutic INR               89-year-old female anticoagulated on Coumadin for history of PAF found to have subacute T4 compression fracture after a fall out of a [employer] lift yesterday.  She did hit her head and has a small laceration to the left posterior scalp that has  "been stapled.  She does have mild neck pain but states that it is not unbearable.  Discussed with patient that this will take time to heal, she verbalizes understanding, she states she does not want any type of brace.  States she has had a T5 compression fracture in the past and did not wear a brace for that either.  No further recommendations from neurosurgery at this time.  We will sign off.  Please feel free to call with any questions or concerns.    PLAN:   -YOCASTA to sign off    I discussed the patient's findings and my recommendations with patient and Dr Dia    During patient visit, I utilized appropriate personal protective equipment including gloves and mask.  Mask used was standard procedure mask. Appropriate PPE was worn during the entire visit.  Hand hygiene was completed before and after.     I spent 40 minutes caring for Caitlyn Longoria on this date of service. This time includes time spent by me in the following activities: preparing for the visit, reviewing tests, obtaining and/or reviewing a separately obtained history, performing a medically appropriate examination and/or evaluation, counseling and educating the patient/family/caregiver, ordering medications, tests, or procedures, referring and communicating with other health care professionals, documenting information in the medical record, independently interpreting results and communicating that information with the patient/family/caregiver, and care coordination         Loren Conrad, APRN  05/14/25  15:45 EDT    \"Dictated utilizing Dragon dictation\".      "

## 2025-05-14 NOTE — ED NOTES
Nursing report ED to floor  Caitlyn Longoria  89 y.o.  female    HPI :  HPI  Stated Reason for Visit: Fall from anika lift. No LOC  History Obtained From: patient, EMS    Chief Complaint  Chief Complaint   Patient presents with    Fall       Admitting doctor:   Amaury Messer MD    Admitting diagnosis:   The primary encounter diagnosis was Laceration of scalp, initial encounter. Diagnoses of Skin tear of left lower leg without complication, initial encounter and Supratherapeutic INR were also pertinent to this visit.    Code status:   Current Code Status       Date Active Code Status Order ID Comments User Context       5/14/2025 0238 CPR (Attempt to Resuscitate) [id number]  Jaylin Murcia APRN ED        Question Answer    Code Status (Patient has no pulse and is not breathing) CPR (Attempt to Resuscitate)    Medical Interventions (Patient has pulse or is breathing) Full Support                    Allergies:   Accupril [quinapril hcl], Ahist [chlorpheniramine], Clarithromycin, Esomeprazole, Latex, Levalbuterol, Levocetirizine, Lipitor [atorvastatin], Omeprazole, Pravachol [pravastatin], Sulindac, Valdecoxib, Chlorcyclizine, Chlorpheniramine-phenylephrine, Diclofenac sodium, Diphenhydramine, Lodine [etodolac], and Sulfa antibiotics    Isolation:   No active isolations    Intake and Output  No intake or output data in the 24 hours ending 05/14/25 0321    Weight:   There were no vitals filed for this visit.    Most recent vitals:   Vitals:    05/13/25 2343 05/14/25 0035 05/14/25 0152 05/14/25 0244   BP:   110/65    BP Location:       Patient Position:       Pulse: 80 81  79   Resp:       Temp:       TempSrc:       SpO2: 94% 98%  97%   Height:           Active LDAs/IV Access:   Lines, Drains & Airways       Active LDAs       Name Placement date Placement time Site Days    External Urinary Catheter 04/25/25 2202  --  18    External Urinary Catheter 05/14/25  0246  --  less than 1                    Labs (abnormal  labs have a star):   Labs Reviewed   PROTIME-INR - Abnormal; Notable for the following components:       Result Value    Protime 47.9 (*)     INR 5.06 (*)     All other components within normal limits   BASIC METABOLIC PANEL   CBC (NO DIFF)   PROTIME-INR       EKG:   No orders to display       Meds given in ED:   Medications   sodium chloride 0.9 % flush 10 mL (has no administration in time range)   sodium chloride 0.9 % flush 10 mL (has no administration in time range)   sodium chloride 0.9 % infusion 40 mL (has no administration in time range)   acetaminophen (TYLENOL) tablet 650 mg (has no administration in time range)     Or   acetaminophen (TYLENOL) 160 MG/5ML oral solution 650 mg (has no administration in time range)     Or   acetaminophen (TYLENOL) suppository 650 mg (has no administration in time range)   ondansetron ODT (ZOFRAN-ODT) disintegrating tablet 4 mg (has no administration in time range)     Or   ondansetron (ZOFRAN) injection 4 mg (has no administration in time range)   calcium carbonate (TUMS) chewable tablet 500 mg (200 mg elemental) (has no administration in time range)   lidocaine (XYLOCAINE) 1 % injection 10 mL (10 mL Injection Given by Other 5/14/25 0027)       Imaging results:  CT Cervical Spine Without Contrast  Result Date: 5/13/2025   1. No acute fracture or subluxation of the cervical spine is seen. 2. The patient does have compression deformity of T4, which is age indeterminate, although favored to be at least subacute.  Radiation dose reduction techniques were utilized, including automated exposure control and exposure modulation based on body size.   This report was finalized on 5/13/2025 11:36 PM by Dr. Heidi Garza M.D on Workstation: [id number]      CT Head Without Contrast  Result Date: 5/13/2025  No acute intracranial findings.  Radiation dose reduction techniques were utilized, including automated exposure control and exposure modulation based on body size.   This report  was finalized on 5/13/2025 11:29 PM by Dr. Heidi Garza M.D on Workstation: [id number]        Ambulatory status:   - assist x2    Social issues:   Social History     Socioeconomic History    Marital status: Single   Tobacco Use    Smoking status: Never     Passive exposure: Never    Smokeless tobacco: Never    Tobacco comments:     caffeine use- soda   Vaping Use    Vaping status: Never Used   Substance and Sexual Activity    Alcohol use: Never    Drug use: No    Sexual activity: Defer       Peripheral Neurovascular  Peripheral Neurovascular (Adult)  Peripheral Neurovascular WDL: .WDL except, neurovascular assessment lower  Additional Documentation: Edema (Group)  Edema  Edema: foot, right, foot, left, ankle, right, ankle, left, leg, right, leg, left  Leg, Left Edema: 1+ (Trace)  Leg, Right Edema: 1+ (Trace)  Ankle, Left Edema: 1+ (Trace)  Ankle, Right Edema: 1+ (Trace)  Foot, Left Edema: 1+ (Trace)  Foot, Right Edema: 1+ (Trace)  LLE Neurovascular Assessment  Temperature LLE: cool  Color LLE: red  RLE Neurovascular Assessment  Temperature RLE: cool  Color RLE: red    Neuro Cognitive  Neuro Cognitive (Adult)  Cognitive/Neuro/Behavioral WDL: WDL, speech  Speech: clear  Additional Documentation: [place] Coma Scale (Group), Pupils (Group)  Pupils  Pupil PERRLA: yes  [place] Coma Scale  Best Eye Response: 4-->(E4) spontaneous  Best Motor Response: 6-->(M6) obeys commands  Best Verbal Response: 5-->(V5) oriented  [place] Coma Scale Score: 15    Learning  Learning Assessment  Learning Readiness and Ability: sensory deficit noted (hard of hearing)  Education Provided  Person Taught: patient, family member/friend    Respiratory  Respiratory  Airway WDL: WDL  Respiratory WDL  Respiratory WDL: WDL    Abdominal Pain  Safety Interventions  Safety Precautions/Falls Reduction: family at bedside    Pain Assessments  Pain (Adult)  (0-10) Pain Rating: Rest: 0    NIH Stroke Scale       Slade Stockton RN  05/14/25 03:21 EDT

## 2025-05-14 NOTE — ED PROVIDER NOTES
EMERGENCY DEPARTMENT ENCOUNTER    Room Number:  P786/1  PCP: Hollie Donnelly MD  Historian: Patient and EMS      HPI:  Chief Complaint: Fall from Rocio lift  A complete HPI/ROS/PMH/PSH/SH/FH are unobtainable due to: None  Context: Caitlyn Longoria is a 89 y.o. female who presents to the ED c/o fall from Rocio lift.  Patient was being transferred using Rocio lift.  Patient fell about 2 feet.  Hit head.  Patient is on warfarin.  Patient presents in c-collar.  States she does not remember the fall.  Is not having any pain.  No chest pain neck pain abdominal pain.  Patient does have skin tear left leg            PAST MEDICAL HISTORY  Active Ambulatory Problems     Diagnosis Date Noted    Neck and shoulder pain 03/24/2017    Arthropathy of shoulder region 03/24/2017    Carpal tunnel syndrome of left wrist 03/24/2017    Chronic pain of both shoulders 06/27/2017    Chronic left shoulder pain 12/05/2017    Arthropathy of left shoulder 12/05/2017    Dysarthria 12/07/2017    Type 2 diabetes mellitus 12/07/2017    Paroxysmal atrial fibrillation 12/07/2017    HLD (hyperlipidemia) 12/07/2017    Chronic bronchitis 12/07/2017    Coronary artery disease 12/07/2017    CVA (cerebral vascular accident) 12/10/2017    HTN (hypertension) 01/14/2018    CKD (chronic kidney disease), stage III 01/14/2018    Chronic combined systolic (congestive) and diastolic (congestive) heart failure 01/15/2018    Infection of prosthetic right knee joint 01/17/2018    Stasis dermatitis of right lower extremity due to peripheral venous hypertension 04/28/2018    Current use of long term anticoagulation 04/28/2018    Morbid obesity 02/08/2019    Acute respiratory failure with hypoxia 09/16/2019    Rhinovirus 02/11/2020    Asthma with acute exacerbation 02/11/2020    Acute respiratory failure with hypoxemia 02/12/2020    Viral pneumonia 04/23/2020    Hypoxia 08/29/2020    CLL (chronic lymphocytic leukemia) 08/31/2020    Dyspnea 09/29/2020    Anticoagulated  on Coumadin     Osteoporotic compression fracture of spine 09/30/2020    Pneumonia due to gram-negative bacteria 10/02/2020    Pneumonia due to infectious organism 09/29/2020    Bilateral carotid artery disease 10/28/2020    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    Cellulitis of right lower extremity 04/14/2024    Hypokalemia 04/15/2024    Nocturnal hypoxia 04/15/2024    COPD (chronic obstructive pulmonary disease) 04/15/2024    Upper respiratory tract infection 08/25/2024    COPD (chronic obstructive pulmonary disease) 08/25/2024    Sick sinus syndrome 08/25/2024    Anemia 08/25/2024    Thrombocytopenia 08/25/2024    Chronic HFrEF (heart failure with reduced ejection fraction) 08/25/2024    Cellulitis of left lower leg 08/26/2024    Acute exacerbation of COPD with asthma 09/27/2024    Leukocytosis 09/27/2024    COPD exacerbation 09/27/2024    Moderate protein-calorie malnutrition 04/29/2025     Resolved Ambulatory Problems     Diagnosis Date Noted    Hypernatremia 01/15/2018    Shortness of breath 04/28/2020    Shortness of breath 08/22/2024    Hypokalemia 08/25/2024    Hypomagnesemia 08/25/2024    Hypocalcemia 08/25/2024    COPD with acute exacerbation 02/25/2025    Coronavirus infection 02/25/2025     Past Medical History:   Diagnosis Date    Aortic calcification     Aortic regurgitation     Asthma     Atrial fibrillation     CAD (coronary artery disease)     Chronic combined systolic and diastolic congestive heart failure     CKD (chronic kidney disease) stage 3, GFR 30-59 ml/min     Coronary artery disease involving native coronary artery of native heart with angina pectoris     Disc degeneration, lumbar     Diverticulosis     DM type 2 (diabetes mellitus, type 2)     Dyslipidemia     GERD (gastroesophageal reflux disease)     History of aneurysm     History of blood transfusion     History of fracture     History of heart attack     History of vitamin D deficiency      Hyperlipidemia     Hypertension     Leukemia     Mild mitral regurgitation     Mitral annular calcification     Osteopenia     PAF (paroxysmal atrial fibrillation)     Peripheral neuropathy     Skin cancer     Sleep apnea     SSS (sick sinus syndrome)     Stroke (cerebrum)     TIA (transient ischemic attack) 2017    Tricuspid regurgitation          PAST SURGICAL HISTORY  Past Surgical History:   Procedure Laterality Date    BRONCHOSCOPY Bilateral 10/6/2020    Procedure: BRONCHOSCOPY with BILATERAL LUNG washings;  Surgeon: Juno Coburn MD;  Location: [facility] ENDOSCOPY;  Service: Pulmonary;  Laterality: Bilateral;  PRE: purulent bronchitis  POST: PURULENT BRONCHITIS    BRONCHOSCOPY Bilateral 10/9/2020    Procedure: BRONCHOSCOPY with washing;  Surgeon: Juno Coburn MD;  Location: [facility] ENDOSCOPY;  Service: Pulmonary;  Laterality: Bilateral;  pre/post - mucous plug      CARDIAC CATHETERIZATION      CARDIAC ELECTROPHYSIOLOGY PROCEDURE N/A 2/7/2020    Procedure: PPM generator change - dual  medtronic;  Surgeon: Kiel Field MD;  Location: [facility] CATH INVASIVE LOCATION;  Service: Cardiology;  Laterality: N/A;    CHOLECYSTECTOMY      CORONARY STENT PLACEMENT      ENDOSCOPY N/A 9/14/2022    Procedure: ESOPHAGOGASTRODUODENOSCOPY with 54fr main dilatation;  Surgeon: Enio Cota MD;  Location: [facility] ENDOSCOPY;  Service: Gastroenterology;  Laterality: N/A;  pre - dysphagia  post - s/p dilatation, watermelon stomach    HERNIA REPAIR      hital hernia    HYSTERECTOMY      PACEMAKER IMPLANTATION      REPLACEMENT TOTAL KNEE Bilateral          FAMILY HISTORY  Family History   Problem Relation Age of Onset    Heart disease Mother     Hypertension Mother     Colon polyps Mother     Heart disease Father     Hypertension Father     Stroke Father     Hypertension Brother     Heart disease Brother     Diabetes Niece     Colon cancer Sister     Hypertension Sister     Hypertension Daughter     Hypertension Son      Hypertension Maternal Aunt     Hypertension Maternal Grandmother     Hypertension Maternal Grandfather     Hypertension Paternal Grandmother     Hypertension Paternal Grandfather          SOCIAL HISTORY  Social History     Socioeconomic History    Marital status: Single   Tobacco Use    Smoking status: Never     Passive exposure: Never    Smokeless tobacco: Never    Tobacco comments:     caffeine use- soda   Vaping Use    Vaping status: Never Used   Substance and Sexual Activity    Alcohol use: Never    Drug use: No    Sexual activity: Defer         ALLERGIES  Accupril [quinapril hcl], Ahist [chlorpheniramine], Clarithromycin, Esomeprazole, Latex, Levalbuterol, Levocetirizine, Lipitor [atorvastatin], Omeprazole, Pravachol [pravastatin], Sulindac, Valdecoxib, Chlorcyclizine, Chlorpheniramine-phenylephrine, Diclofenac sodium, Diphenhydramine, Lodine [etodolac], and Sulfa antibiotics        REVIEW OF SYSTEMS  Review of Systems   Fall      PHYSICAL EXAM  ED Triage Vitals [05/13/25 2209]   Temp Heart Rate Resp BP SpO2   98.1 °F (36.7 °C) 89 16 120/65 98 %      Temp src Heart Rate Source Patient Position BP Location FiO2 (%)   Tympanic Monitor Lying Left arm --       Physical Exam      GENERAL: no acute distress.  Abrasion to scalp  HENT: nares patent  EYES: no scleral icterus  CV: regular rhythm, normal rate  RESPIRATORY: normal effort  ABDOMEN: soft  MUSCULOSKELETAL: no deformity.  C-collar in place  NEURO: alert, moves all extremities, follows commands  PSYCH:  calm, cooperative  SKIN: warm, dry.  Skin tear posterior left leg    Vital signs and nursing notes reviewed.          LAB RESULTS  Recent Results (from the past 24 hours)   Protime-INR    Collection Time: 05/14/25 12:18 AM    Specimen: Arm, Right; Blood   Result Value Ref Range    Protime 47.9 (C) 11.7 - 14.2 Seconds    INR 5.06 (C) 0.90 - 1.10   Basic Metabolic Panel    Collection Time: 05/14/25  5:48 AM    Specimen: Blood   Result Value Ref Range    Glucose 73  65 - 99 mg/dL    BUN 24 (H) 8 - 23 mg/dL    Creatinine 0.61 0.57 - 1.00 mg/dL    Sodium 139 136 - 145 mmol/L    Potassium 3.4 (L) 3.5 - 5.2 mmol/L    Chloride 102 98 - 107 mmol/L    CO2 27.7 22.0 - 29.0 mmol/L    Calcium 7.7 (L) 8.2 - 9.6 mg/dL    BUN/Creatinine Ratio 39.3 (H) 7.0 - 25.0    Anion Gap 9.3 5.0 - 15.0 mmol/L    eGFR 85.1 >60.0 mL/min/1.73   CBC (No Diff)    Collection Time: 05/14/25  5:48 AM    Specimen: Blood   Result Value Ref Range    WBC 42.04 (C) 3.40 - 10.80 10*3/mm3    RBC 3.96 3.77 - 5.28 10*6/mm3    Hemoglobin 12.0 12.0 - 15.9 g/dL    Hematocrit 38.6 34.0 - 46.6 %    MCV 97.5 (H) 79.0 - 97.0 fL    MCH 30.3 26.6 - 33.0 pg    MCHC 31.1 (L) 31.5 - 35.7 g/dL    RDW 13.7 12.3 - 15.4 %    RDW-SD 48.3 37.0 - 54.0 fl    MPV 11.2 6.0 - 12.0 fL    Platelets 145 140 - 450 10*3/mm3       Ordered the above labs and reviewed the results.        RADIOLOGY  CT Cervical Spine Without Contrast  Result Date: 5/13/2025  CT OF THE CERVICAL SPINE  HISTORY: Fall  COMPARISON: April 14, 2024  TECHNIQUE: Axial CT imaging was obtained through the cervical spine. Coronal and sagittal reformatted images were obtained.  FINDINGS: No acute fracture or subluxation of the cervical spine is seen. The patient is noted to have compression deformity of T4. This was not clearly identified on exam from January 6, 2025. It remains age indeterminate. There is anterolisthesis of C6 on C7, which was also present on prior exam. Intervertebral disc space narrowing is most significant at C4-C5 and C5-C6. There is no prevertebral soft tissue swelling. Advanced degenerative changes are noted at the atlantoaxial junction. Images through the lung apices demonstrate some mild biapical scarring. There is minimal canal stenosis and neural foraminal narrowing.       1. No acute fracture or subluxation of the cervical spine is seen. 2. The patient does have compression deformity of T4, which is age indeterminate, although favored to be at least  subacute.  Radiation dose reduction techniques were utilized, including automated exposure control and exposure modulation based on body size.   This report was finalized on 5/13/2025 11:36 PM by Dr. Heidi Garza M.D on Workstation: [employer]      CT Head Without Contrast  Result Date: 5/13/2025  CT OF THE HEAD WITHOUT CONTRAST  HISTORY: Fall  COMPARISON: January 6, 2025  TECHNIQUE: Axial CT imaging was obtained through the brain. No IV contrast was administered.  FINDINGS: No acute intracranial hemorrhage is seen. There is diffuse atrophy. There is periventricular and deep white matter microangiopathic change. There is no midline shift or mass effect. No calvarial fracture is seen. Small mucous retention cyst is noted within the right maxillary sinus. There is a left parieto-occipital scalp hematoma.      No acute intracranial findings.  Radiation dose reduction techniques were utilized, including automated exposure control and exposure modulation based on body size.   This report was finalized on 5/13/2025 11:29 PM by Dr. Heidi Garza M.D on Workstation: [employer]        Ordered the above noted radiological studies.  CT head independent interpreted by me and shows no evidence of bleeding          PROCEDURES  Laceration Repair    Date/Time: 5/14/2025 12:59 AM    Performed by: Guillermo Collins MD  Authorized by: Guillermo Collins MD    Consent:     Consent obtained:  Verbal    Consent given by:  Patient    Risks discussed:  Infection and pain  Universal protocol:     Imaging studies available: yes      Patient identity confirmed:  Verbally with patient  Anesthesia:     Anesthesia method:  Local infiltration    Local anesthetic:  Lidocaine 1% w/o epi  Laceration details:     Location:  Scalp    Length (cm):  1    Depth (mm):  1  Pre-procedure details:     Preparation:  Patient was prepped and draped in usual sterile fashion  Exploration:     Wound exploration: entire depth of wound visualized     Treatment:     Area cleansed with:  Saline    Amount of cleaning:  Standard    Irrigation solution:  Sterile saline    Irrigation method:  Syringe    Debridement:  None  Skin repair:     Repair method:  Staples    Number of staples:  2  Approximation:     Approximation:  Close  Repair type:     Repair type:  Simple  Post-procedure details:     Dressing:  Open (no dressing)              MEDICATIONS GIVEN IN ER  Medications   sodium chloride 0.9 % flush 10 mL (10 mL Intravenous Given 5/14/25 0612)   sodium chloride 0.9 % flush 10 mL (has no administration in time range)   sodium chloride 0.9 % infusion 40 mL (has no administration in time range)   acetaminophen (TYLENOL) tablet 650 mg (has no administration in time range)     Or   acetaminophen (TYLENOL) 160 MG/5ML oral solution 650 mg (has no administration in time range)     Or   acetaminophen (TYLENOL) suppository 650 mg (has no administration in time range)   ondansetron ODT (ZOFRAN-ODT) disintegrating tablet 4 mg (has no administration in time range)     Or   ondansetron (ZOFRAN) injection 4 mg (has no administration in time range)   calcium carbonate (TUMS) chewable tablet 500 mg (200 mg elemental) (has no administration in time range)   lidocaine (XYLOCAINE) 1 % injection 10 mL (10 mL Injection Given by Other 5/14/25 0027)                   MEDICAL DECISION MAKING, PROGRESS, and CONSULTS    All labs have been independently reviewed by me.  All radiology studies have been reviewed by me and I have also reviewed the radiology report.   EKGs independently viewed and interpreted by me.  Discussion below represents my analysis of pertinent findings related to patient's condition, differential diagnosis, treatment plan and final disposition.      Additional sources:  - Discussed/ obtained information from independent historians: None    - External (non-ED) record review: Epic reviewed patient admitted 4/25/2025 for sepsis    - Chronic or social conditions impacting  care: None    - Shared decision making: None      Orders placed during this visit:  Orders Placed This Encounter   Procedures    Laceration Repair    CT Head Without Contrast    CT Cervical Spine Without Contrast    Protime-INR    Basic Metabolic Panel    CBC (No Diff)    Protime-INR    Diet: Regular/House; Fluid Consistency: Thin (IDDSI 0)    Vital Signs    Intake & Output    Weigh Patient    Oral Care    Saline Lock & Maintain IV Access    Place Sequential Compression Device    Maintain Sequential Compression Device    Neuro Checks    Code Status and Medical Interventions: CPR (Attempt to Resuscitate); Full Support    [facility] (on-call MD unless specified) Details    Inpatient Neurosurgery Consult    Insert Peripheral IV    Initiate Observation Status         Additional orders considered but not ordered:  None        Differential diagnosis includes but is not limited to:    Skull fracture versus contusion versus intracranial hemorrhage      Independent interpretation of labs, radiology studies, and discussions with consultants:  ED Course as of 05/14/25 0647   Wed May 14, 2025   0120 01:20 EDT  Patient fall from Rocio lift.  Patient had scalp laceration that has been repaired.  CT scan of the head and C-spine are negative.  Patient does have skin tear. [SL]   0218 INR(!!): 5.06 [CC]   0218 Rechecked on patient: She is alert and oriented.  She answers all questions appropriately.  Has a nonfocal neurologic exam.  Patient will need to be admitted for observation for routine neurologic checks.  Patient is agreeable. [CC]   0239 Spoke with GILSON Mosqueda with [facility].  Reviewed history, exam, results, treatments.  She agrees admit the patient to Dr. Messer.   [CC]      ED Course User Index  [CC] Aure Rojas PA-C  [SL] Guillermo Collins MD                 DIAGNOSIS  Final diagnoses:   Laceration of scalp, initial encounter   Skin tear of left lower leg without complication, initial encounter   Supratherapeutic INR          DISPOSITION  admit            Latest Documented Vital Signs:  As of 06:47 EDT  BP- 100/58 HR- 82 Temp- 98.3 °F (36.8 °C) (Oral) O2 sat- 93%              --    Please note that portions of this were completed with a voice recognition program.       Note Disclaimer: At [facility], we believe that sharing information builds trust and better relationships. You are receiving this note because you are receiving care at [facility] or recently visited. It is possible you will see health information before a provider has talked with you about it. This kind of information can be easy to misunderstand. To help you fully understand what it means for your health, we urge you to discuss this note with your provider.            Guillermo Collins MD  05/14/25 0646

## 2025-05-14 NOTE — ED NOTES
Pt arrived to ED via EMS from [facility]. Pt was on a anika lift getting out of the bathtub to a chair. Pt fell about 2 feet. Pt denies any loss of consciousnes but does not remember actually falling. Pt is on Warfarin. Pt is not complaining of any pain. Minor abrasion noted to the back of the head and skin tear to the left leg.   Pt has some forgetfullness at baseline but is Aox4.

## 2025-05-14 NOTE — PLAN OF CARE
Goal Outcome Evaluation:        By Dr. Garcia ok to bypass sepsis protocol for the moment. Blood cultures ordered but low suspicious for sepsis. Patient on keflex as chronic suppressive therapy for joint infection, also taking prednisone.

## 2025-05-14 NOTE — PROGRESS NOTES
"Nutrition Services    Patient Name: Caitlyn Longoria  YOB: 1934  MRN: [id number]  Admission date: 5/13/2025    Assessment Date:  05/14/25    NUTRITION EVALUATION      Reason for Encounter MST score 2+   Diagnosis/Problem Admission Diagnosis:  Closed head injury [S09.90XA]  Supratherapeutic INR [R79.1]  Laceration of scalp, initial encounter [S01.01XA]  Skin tear of left lower leg without complication, initial encounter [S81.812A]    Problem List:    Closed head injury    Type 2 diabetes mellitus    HLD (hyperlipidemia)    Coronary artery disease    HTN (hypertension)    CKD (chronic kidney disease), stage III    Chronic combined systolic (congestive) and diastolic (congestive) heart failure    CLL (chronic lymphocytic leukemia)    Anticoagulated on Coumadin    COPD (chronic obstructive pulmonary disease)    Sick sinus syndrome    Chronic HFrEF (heart failure with reduced ejection fraction)    Thoracic compression fracture, closed, initial encounter     Narrative 89 yo female adm s/p fall with [facility], hx COPD    Patient reports weight loss but medical review indicates stable weight x 6 months       PO Diet Diet: Regular/House; Fluid Consistency: Thin (IDDSI 0)   Allergies NKFA   Supplements n/a   PO Intake % 75% of lunch per pt after partial denture was brought in for her       Chewing/Swallowing Difficulty no issues identified at this time       Medications reviewed   Labs  reviewed       Physical Findings alert     Edema 3+ (moderate) BLE   GI Function passing flatus, last bowel movement: 5/14   Skin Status non-healing wound(s), skin tear small wound on RLE   Lines/Drains none   I/O reviewed        Height  Weight  BMI  Weight Trend     Height: 160 cm (63\")   81.1 kg  Body mass index is 31.69 kg/m².  Stable         NFPE No clinical signs of muscle wasting or fat loss       Nutrition Problem (PES)  Nutrition Appropriate for Condition at this Time       Intervention/Plan Continue present diet    Will " follow progress and assist as needed.    RD to follow up per protocol.     Results from last 7 days   Lab Units 05/14/25  0548   SODIUM mmol/L 139   POTASSIUM mmol/L 3.4*   CHLORIDE mmol/L 102   CO2 mmol/L 27.7   BUN mg/dL 24*   CREATININE mg/dL 0.61   CALCIUM mg/dL 7.7*   GLUCOSE mg/dL 73     Results from last 7 days   Lab Units 05/14/25  0548   HEMOGLOBIN g/dL 12.0   HEMATOCRIT % 38.6     Lab Results   Component Value Date    HGBA1C 6.80 (H) 08/23/2024     Wt Readings from Last 10 Encounters:   05/05/25 81.1 kg (178 lb 14.4 oz)   03/20/25 78.6 kg (173 lb 4.8 oz)   03/03/25 80.7 kg (178 lb)   02/27/25 80.8 kg (178 lb 3.2 oz)   02/20/25 79.7 kg (175 lb 12.8 oz)   02/20/25 78.5 kg (173 lb)   02/07/25 78.5 kg (173 lb)   01/23/25 81.2 kg (179 lb)   01/06/25 84.5 kg (186 lb 4.6 oz)   12/26/24 84.5 kg (186 lb 3.2 oz)       Electronically signed by:  Akhil Jay RD  05/14/25 16:36 EDT

## 2025-05-14 NOTE — H&P
Patient Name:  Caitlyn Longoria  YOB: 1934  MRN:  [id number]  Admit Date:  5/13/2025  Patient Care Team:  Hollie Donnelly MD as PCP - General (Geriatric Medicine)  Pao Levi CHILO as Pharmacist  Alexander Valiente PharmD as Pharmacist (Pharmacy)  Zenaida Suarez MD as Referring Physician (Internal Medicine)  Lucien Larkin MD as Consulting Physician (Hematology and Oncology)      Subjective   History Present Illness     Chief Complaint   Patient presents with    Fall     This is a 89-year-old woman with a past medical history of type 2 diabetes, paroxysmal atrial fibrillation, hypertension, hyperlipidemia, stage III CKD, combined systolic and diastolic heart failure, COPD exacerbation COPD presents the hospital after experiencing mechanical fall.  Reportedly she fell from a [employer] lift during transfer.  She fell out 2 feet and hit her head.  Of note she is on warfarin for atrial fibrillation.  Upon initial evaluation she underwent a CT scan of her head and neck that was negative for any acute intracranial or cervical pathology.  Her INR was noted to be 5.2.  The patient was admitted for observation and routine neurologic checks.    Personal History     Past Medical History:   Diagnosis Date    Aortic calcification     mild, on echo 12/17/2017    Aortic regurgitation     Trace    Asthma     Atrial fibrillation     CAD (coronary artery disease)     Carpal tunnel syndrome of left wrist     Chronic combined systolic and diastolic congestive heart failure     CKD (chronic kidney disease) stage 3, GFR 30-59 ml/min     COPD (chronic obstructive pulmonary disease)     Coronary artery disease involving native coronary artery of native heart with angina pectoris     Disc degeneration, lumbar     Diverticulosis     DM type 2 (diabetes mellitus, type 2)     Dyslipidemia     GERD (gastroesophageal reflux disease)     History of aneurysm     right femoral artery s/p [facility]    History of blood  transfusion     History of fracture     History of heart attack     History of vitamin D deficiency     Hyperlipidemia     Hypertension     Leukemia     Mild mitral regurgitation     Mitral annular calcification     12/8/2017- echo, moderate    Osteopenia     PAF (paroxysmal atrial fibrillation)     Peripheral neuropathy     Skin cancer     Left hand    Sleep apnea     bipap    SSS (sick sinus syndrome)     Stroke (cerebrum)     TIA (transient ischemic attack) 2017    Tricuspid regurgitation     Trace     Past Surgical History:   Procedure Laterality Date    BRONCHOSCOPY Bilateral 10/6/2020    Procedure: BRONCHOSCOPY with BILATERAL LUNG washings;  Surgeon: Juno Coburn MD;  Location: [facility] ENDOSCOPY;  Service: Pulmonary;  Laterality: Bilateral;  PRE: purulent bronchitis  POST: PURULENT BRONCHITIS    BRONCHOSCOPY Bilateral 10/9/2020    Procedure: BRONCHOSCOPY with washing;  Surgeon: Juno Coburn MD;  Location: [facility] ENDOSCOPY;  Service: Pulmonary;  Laterality: Bilateral;  pre/post - mucous plug      CARDIAC CATHETERIZATION      CARDIAC ELECTROPHYSIOLOGY PROCEDURE N/A 2/7/2020    Procedure: PPM generator change - dual  medtronic;  Surgeon: Kiel Field MD;  Location: [facility] CATH INVASIVE LOCATION;  Service: Cardiology;  Laterality: N/A;    CHOLECYSTECTOMY      CORONARY STENT PLACEMENT      ENDOSCOPY N/A 9/14/2022    Procedure: ESOPHAGOGASTRODUODENOSCOPY with 54fr main dilatation;  Surgeon: Enio Cota MD;  Location: [facility] ENDOSCOPY;  Service: Gastroenterology;  Laterality: N/A;  pre - dysphagia  post - s/p dilatation, watermelon stomach    HERNIA REPAIR      hital hernia    HYSTERECTOMY      PACEMAKER IMPLANTATION      REPLACEMENT TOTAL KNEE Bilateral      Family History   Problem Relation Age of Onset    Heart disease Mother     Hypertension Mother     Colon polyps Mother     Heart disease Father     Hypertension Father     Stroke Father     Hypertension Brother     Heart disease Brother      Diabetes Niece     Colon cancer Sister     Hypertension Sister     Hypertension Daughter     Hypertension Son     Hypertension Maternal Aunt     Hypertension Maternal Grandmother     Hypertension Maternal Grandfather     Hypertension Paternal Grandmother     Hypertension Paternal Grandfather      Social History     Tobacco Use    Smoking status: Never     Passive exposure: Never    Smokeless tobacco: Never    Tobacco comments:     caffeine use- soda   Vaping Use    Vaping status: Never Used   Substance Use Topics    Alcohol use: Never    Drug use: No     No current facility-administered medications on file prior to encounter.     Current Outpatient Medications on File Prior to Encounter   Medication Sig Dispense Refill    acetaminophen (TYLENOL) 325 MG tablet Take 2 tablets by mouth Every 4 (Four) Hours As Needed for Mild Pain .      Acetylcysteine (NAC PO) Take 1,000 mg by mouth 2 (Two) Times a Day.      albuterol (PROVENTIL) (2.5 MG/3ML) 0.083% nebulizer solution Take 2.5 mg by nebulization Every 4 (Four) Hours.      albuterol sulfate  (90 Base) MCG/ACT inhaler Inhale 2 puffs Every 4 (Four) Hours As Needed for Wheezing.      arformoterol (BROVANA) 15 MCG/2ML nebulizer solution Take 2 mL by nebulization 2 (Two) Times a Day. 120 mL 0    Benralizumab (FASENRA SC) Inject 1 dose under the skin into the appropriate area as directed Every 2 (Two) Months. Every 8 weeks      budesonide (PULMICORT) 0.5 MG/2ML nebulizer solution Take 2 mL by nebulization 2 (Two) Times a Day.      Calcium Carbonate-Vitamin D (calcium 500 mg vitamin D 5 mcg, 200 UT,) 500-5 MG-MCG tablet per tablet Take 1 tablet by mouth 2 (Two) Times a Day. 60 tablet 3    carvedilol (COREG) 3.125 MG tablet Take 1 tablet by mouth 2 (Two) Times a Day With Meals.      cephalexin (KEFLEX) 500 MG capsule Take 1 capsule by mouth 3 (Three) Times a Day for 90 days. 270 capsule 3    Cetirizine HCl 10 MG capsule Take 1 capsule by mouth 2 (Two) Times a Day.       Cholecalciferol 25 MCG (1000 UT) tablet Take 1 tablet by mouth Daily.      fluticasone (FLONASE) 50 MCG/ACT nasal spray Administer 1 spray into the nostril(s) as directed by provider Daily.      furosemide (LASIX) 20 MG tablet Take 1 tablet by mouth Daily.      glipizide (GLUCOTROL) 5 MG tablet Take 1 tablet by mouth Daily With Breakfast.      guaiFENesin (MUCINEX) 600 MG 12 hr tablet Take 2 tablets by mouth Every 12 (Twelve) Hours.      ipratropium-albuterol (DUO-NEB) 0.5-2.5 mg/3 ml nebulizer Take 3 mL by nebulization 4 (Four) Times a Day. 360 mL 0    melatonin 5 MG tablet tablet Take 0.5 tablets by mouth At Night As Needed (sleep).      Menthol-Zinc Oxide 0.44-20.6 % ointment Apply 1 Application topically to the appropriate area as directed Every 12 (Twelve) Hours.      metFORMIN ER (GLUCOPHAGE-XR) 500 MG 24 hr tablet Take 1 tablet by mouth Daily With Breakfast.      miconazole (MICOTIN) 2 % powder Apply 1 Application topically to the appropriate area as directed Every 12 (Twelve) Hours.      montelukast (SINGULAIR) 10 MG tablet Take 1 tablet by mouth Every Night.      nystatin (MYCOSTATIN) 024521 UNIT/GM cream Apply 1 Application topically to the appropriate area as directed 2 (Two) Times a Day.      nystatin-triamcinolone (MYCOLOG II) 034600-5.1 UNIT/GM-% cream Apply 1 Application topically to the appropriate area as directed 2 (Two) Times a Day.      oxybutynin XL (DITROPAN-XL) 10 MG 24 hr tablet Take 1 tablet by mouth 2 (Two) Times a Day.      predniSONE (DELTASONE) 10 MG tablet Take 4 tablets by mouth Daily With Breakfast for 2 days, THEN 3 tablets Daily With Breakfast for 2 days, THEN 2 tablets Daily With Breakfast for 2 days, THEN 1 tablet Daily With Breakfast for 2 days.      rosuvastatin (CRESTOR) 20 MG tablet Take 1 tablet by mouth Every Night.      sodium chloride 7 % nebulizer solution nebulizer solution Take 4 mL by nebulization 2 (Two) Times a Day.      warfarin (COUMADIN) 4 MG tablet Take one  tablet by mouth on mon, wed, fri and take one-half of a tablet by mouth all other days or as directed 65 tablet 1     Allergies   Allergen Reactions    Accupril [Quinapril Hcl] Swelling, Other (See Comments), GI Intolerance and Delirium     HA, constipation     Ahist [Chlorpheniramine] Nausea Only, Other (See Comments) and Dizziness     Headache, Blurred vision    Clarithromycin Nausea Only, Other (See Comments) and Mental Status Change     HA, Depression, Flushing    Esomeprazole GI Intolerance    Latex Other (See Comments)     Skin breakdown    Levalbuterol Swelling    Levocetirizine Diarrhea and GI Intolerance    Lipitor [Atorvastatin] Other (See Comments) and Myalgia     Dark urine    Omeprazole Nausea Only and Other (See Comments)     HA    Pravachol [Pravastatin] Nausea Only and GI Intolerance     Bloated, Constipation, HA    Sulindac Other (See Comments) and Myalgia     HA, joint pain, bruising    Valdecoxib Irritability    Chlorcyclizine Unknown - Low Severity    Chlorpheniramine-Phenylephrine Unknown - High Severity    Diclofenac Sodium Unknown - Low Severity    Diphenhydramine Unknown - Low Severity    Lodine [Etodolac] Unknown - Low Severity    Sulfa Antibiotics Unknown - Low Severity       Objective    Objective     Vital Signs  Temp:  [98.1 °F (36.7 °C)-98.4 °F (36.9 °C)] 98.1 °F (36.7 °C)  Heart Rate:  [76-89] 76  Resp:  [16-18] 18  BP: ()/(50-72) 92/50  SpO2:  [91 %-98 %] 92 %  on   ;   Device (Oxygen Therapy): room air  Body mass index is 31.69 kg/m².    Physical Exam  Constitutional:       General: She is not in acute distress.  HENT:      Head: Normocephalic and atraumatic.   Cardiovascular:      Rate and Rhythm: Normal rate and regular rhythm.   Pulmonary:      Effort: Pulmonary effort is normal. No respiratory distress.   Abdominal:      General: There is no distension.      Palpations: Abdomen is soft.      Tenderness: There is no abdominal tenderness.   Neurological:      Mental Status:  She is alert and oriented to person, place, and time.         Results Review:  I reviewed the patient's new clinical results.  I reviewed the patient's new imaging results and agree with the interpretation.  I reviewed the patient's other test results and agree with the interpretation  I personally viewed and interpreted the patient's EKG/Telemetry data  Discussed with ED provider.    Lab Results (last 24 hours)       Procedure Component Value Units Date/Time    Protime-INR [[id number]]  (Abnormal) Collected: 05/14/25 0018    Specimen: Blood from Arm, Right Updated: 05/14/25 0152     Protime 47.9 Seconds      INR 5.06    Basic Metabolic Panel [[id number]]  (Abnormal) Collected: 05/14/25 0548    Specimen: Blood Updated: 05/14/25 0626     Glucose 73 mg/dL      BUN 24 mg/dL      Creatinine 0.61 mg/dL      Sodium 139 mmol/L      Potassium 3.4 mmol/L      Comment: Slight hemolysis detected by analyzer. Result may be falsely elevated.        Chloride 102 mmol/L      CO2 27.7 mmol/L      Calcium 7.7 mg/dL      BUN/Creatinine Ratio 39.3     Anion Gap 9.3 mmol/L      eGFR 85.1 mL/min/1.73     Narrative:      GFR Categories in Chronic Kidney Disease (CKD)              GFR Category          GFR (mL/min/1.73)    Interpretation  G1                    90 or greater        Normal or high (1)  G2                    60-89                Mild decrease (1)  G3a                   45-59                Mild to moderate decrease  G3b                   30-44                Moderate to severe decrease  G4                    15-29                Severe decrease  G5                    14 or less           Kidney failure    (1)In the absence of evidence of kidney disease, neither GFR category G1 or G2 fulfill the criteria for CKD.    eGFR calculation 2021 CKD-EPI creatinine equation, which does not include race as a factor    CBC (No Diff) [[id number]]  (Abnormal) Collected: 05/14/25 0548    Specimen: Blood Updated: 05/14/25 0613     WBC 42.04  10*3/mm3      RBC 3.96 10*6/mm3      Hemoglobin 12.0 g/dL      Hematocrit 38.6 %      MCV 97.5 fL      MCH 30.3 pg      MCHC 31.1 g/dL      RDW 13.7 %      RDW-SD 48.3 fl      MPV 11.2 fL      Platelets 145 10*3/mm3     Protime-INR [[id number]]  (Abnormal) Collected: 05/14/25 0548    Specimen: Blood Updated: 05/14/25 0833     Protime 49.2 Seconds      INR 5.23    POC Glucose Once [[id number]]  (Abnormal) Collected: 05/14/25 0701    Specimen: Blood Updated: 05/14/25 0702     Glucose 69 mg/dL     POC Glucose Once [[id number]]  (Normal) Collected: 05/14/25 0751    Specimen: Blood Updated: 05/14/25 0753     Glucose 76 mg/dL     POC Glucose Once [[id number]]  (Normal) Collected: 05/14/25 1119    Specimen: Blood Updated: 05/14/25 1121     Glucose 98 mg/dL             Results for orders placed or performed during the hospital encounter of 04/25/25   Blood Culture - Blood, Arm, Left    Specimen: Arm, Left; Blood   Result Value Ref Range    Blood Culture No growth at 5 days        Imaging Results (Last 24 Hours)       Procedure Component Value Units Date/Time    CT Cervical Spine Without Contrast [[id number]] Collected: 05/13/25 2329     Updated: 05/13/25 2339    Narrative:      CT OF THE CERVICAL SPINE     HISTORY: Fall     COMPARISON: April 14, 2024     TECHNIQUE: Axial CT imaging was obtained through the cervical spine.  Coronal and sagittal reformatted images were obtained.     FINDINGS:  No acute fracture or subluxation of the cervical spine is seen. The  patient is noted to have compression deformity of T4. This was not  clearly identified on exam from January 6, 2025. It remains age  indeterminate. There is anterolisthesis of C6 on C7, which was also  present on prior exam. Intervertebral disc space narrowing is most  significant at C4-C5 and C5-C6. There is no prevertebral soft tissue  swelling. Advanced degenerative changes are noted at the atlantoaxial  junction. Images through the lung apices demonstrate some mild  biapical  scarring. There is minimal canal stenosis and neural foraminal  narrowing.       Impression:         1. No acute fracture or subluxation of the cervical spine is seen.  2. The patient does have compression deformity of T4, which is age  indeterminate, although favored to be at least subacute.     Radiation dose reduction techniques were utilized, including automated  exposure control and exposure modulation based on body size.        This report was finalized on 5/13/2025 11:36 PM by Dr. Heidi Garza M.D on Workstation: [id number]       CT Head Without Contrast [[id number]] Collected: 05/13/25 2326     Updated: 05/13/25 2332    Narrative:      CT OF THE HEAD WITHOUT CONTRAST     HISTORY: Fall     COMPARISON: January 6, 2025     TECHNIQUE: Axial CT imaging was obtained through the brain. No IV  contrast was administered.     FINDINGS:  No acute intracranial hemorrhage is seen. There is diffuse atrophy.  There is periventricular and deep white matter microangiopathic change.  There is no midline shift or mass effect. No calvarial fracture is seen.  Small mucous retention cyst is noted within the right maxillary sinus.  There is a left parieto-occipital scalp hematoma.       Impression:      No acute intracranial findings.     Radiation dose reduction techniques were utilized, including automated  exposure control and exposure modulation based on body size.        This report was finalized on 5/13/2025 11:29 PM by Dr. Heidi Garza M.D on Workstation: [id number]               Results for orders placed during the hospital encounter of 08/22/24    Adult Transthoracic Echo Complete W/ Cont if Necessary Per Protocol    Interpretation Summary    Left ventricular wall thickness is consistent with hypertrophy. Sigmoid-shaped ventricular septum is present.    Septal wall motion is abnormal, consistent with right ventricular pacing.    Left ventricular ejection fraction appears to be 41 - 45%.    Left  ventricular diastolic function is consistent with (grade II w/high LAP) pseudonormalization    Normal right ventricular cavity size and systolic function noted    The left atrial cavity is moderately dilated.    The aortic valve leaflets are moderately calcified (aortic sclerosis)    Calculated right ventricular systolic pressure from tricuspid regurgitation is 20 mmHg.    There is no evidence of pericardial effusion      No orders to display              Assessment/Plan     Active Hospital Problems    Diagnosis  POA    **Closed head injury [S09.90XA]  Yes    COPD (chronic obstructive pulmonary disease) [J44.9]  Yes    Chronic HFrEF (heart failure with reduced ejection fraction) [I50.22]  Yes    Sick sinus syndrome [I49.5]  Yes    Anticoagulated on Coumadin [Z79.01]  Not Applicable    CLL (chronic lymphocytic leukemia) [C91.10]  Yes    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     Mechanical fall  Supratherapeutic INR  Closed head injury  Current INR is 5.23. Hold warfarin. Currently there is no sign of bleeding and no indication for administration of vitamin K  -check Daily INR     CLL  The patient has been on steroids for asthma exacerbation which could be exacerbating the white blood cell count. Continue to monitor. Currently she is afebrile and has been on suppressive cephalexin ever since completing cefpodoxime and doxycycline for skin soft tissue infection.    Atrial fibrillation  Sick sinus syndrome   Hold coreg due to soft blood pressure. Hold warfarin due to elevated INR    Chronic systolic and diastolic heart failure  Hold lasix and coreg as above     Type 2 Diabetes  Hold metformin  and glipizide   Start SS    COPD   Resume home inhalers     Asthma  Resume       I discussed the patient's findings and my recommendations with patient and family.    VTE Prophylaxis - SCDs.  Code Status - Full code.       Isacc Garcia MD  [facility]  05/14/25  14:15 EDT

## 2025-05-15 ENCOUNTER — APPOINTMENT (OUTPATIENT)
Dept: CT IMAGING | Facility: HOSPITAL | Age: OVER 89
DRG: 605 | End: 2025-05-15
Payer: MEDICARE

## 2025-05-15 LAB
ALBUMIN SERPL-MCNC: 3 G/DL (ref 3.5–5.2)
ALP SERPL-CCNC: 182 U/L (ref 39–117)
ALT SERPL W P-5'-P-CCNC: 26 U/L (ref 1–33)
ANION GAP SERPL CALCULATED.3IONS-SCNC: 7.5 MMOL/L (ref 5–15)
AST SERPL-CCNC: 25 U/L (ref 1–32)
BILIRUB CONJ SERPL-MCNC: 0.3 MG/DL (ref 0–0.3)
BILIRUB INDIRECT SERPL-MCNC: 0.4 MG/DL
BILIRUB SERPL-MCNC: 0.7 MG/DL (ref 0–1.2)
BUN SERPL-MCNC: 18 MG/DL (ref 8–23)
BUN/CREAT SERPL: 30.5 (ref 7–25)
CALCIUM SPEC-SCNC: 7.8 MG/DL (ref 8.2–9.6)
CHLORIDE SERPL-SCNC: 105 MMOL/L (ref 98–107)
CO2 SERPL-SCNC: 24.5 MMOL/L (ref 22–29)
CREAT SERPL-MCNC: 0.59 MG/DL (ref 0.57–1)
DEPRECATED RDW RBC AUTO: 47.4 FL (ref 37–54)
EGFRCR SERPLBLD CKD-EPI 2021: 85.7 ML/MIN/1.73
ERYTHROCYTE [DISTWIDTH] IN BLOOD BY AUTOMATED COUNT: 13.3 % (ref 12.3–15.4)
GLUCOSE BLDC GLUCOMTR-MCNC: 126 MG/DL (ref 70–130)
GLUCOSE BLDC GLUCOMTR-MCNC: 178 MG/DL (ref 70–130)
GLUCOSE BLDC GLUCOMTR-MCNC: 188 MG/DL (ref 70–130)
GLUCOSE BLDC GLUCOMTR-MCNC: 209 MG/DL (ref 70–130)
GLUCOSE SERPL-MCNC: 96 MG/DL (ref 65–99)
HCT VFR BLD AUTO: 39.3 % (ref 34–46.6)
HGB BLD-MCNC: 12.5 G/DL (ref 12–15.9)
INR PPP: 4.44 (ref 0.9–1.1)
MCH RBC QN AUTO: 31.1 PG (ref 26.6–33)
MCHC RBC AUTO-ENTMCNC: 31.8 G/DL (ref 31.5–35.7)
MCV RBC AUTO: 97.8 FL (ref 79–97)
PLATELET # BLD AUTO: 125 10*3/MM3 (ref 140–450)
PMV BLD AUTO: 11.2 FL (ref 6–12)
POTASSIUM SERPL-SCNC: 4.6 MMOL/L (ref 3.5–5.2)
PROT SERPL-MCNC: 5.2 G/DL (ref 6–8.5)
PROTHROMBIN TIME: 43.2 SECONDS (ref 11.7–14.2)
RBC # BLD AUTO: 4.02 10*6/MM3 (ref 3.77–5.28)
SODIUM SERPL-SCNC: 137 MMOL/L (ref 136–145)
WBC NRBC COR # BLD AUTO: 37.29 10*3/MM3 (ref 3.4–10.8)

## 2025-05-15 PROCEDURE — 94664 DEMO&/EVAL PT USE INHALER: CPT

## 2025-05-15 PROCEDURE — 80048 BASIC METABOLIC PNL TOTAL CA: CPT | Performed by: STUDENT IN AN ORGANIZED HEALTH CARE EDUCATION/TRAINING PROGRAM

## 2025-05-15 PROCEDURE — 80076 HEPATIC FUNCTION PANEL: CPT | Performed by: STUDENT IN AN ORGANIZED HEALTH CARE EDUCATION/TRAINING PROGRAM

## 2025-05-15 PROCEDURE — 94799 UNLISTED PULMONARY SVC/PX: CPT

## 2025-05-15 PROCEDURE — 63710000001 PREDNISONE PER 5 MG: Performed by: STUDENT IN AN ORGANIZED HEALTH CARE EDUCATION/TRAINING PROGRAM

## 2025-05-15 PROCEDURE — 82948 REAGENT STRIP/BLOOD GLUCOSE: CPT

## 2025-05-15 PROCEDURE — 97162 PT EVAL MOD COMPLEX 30 MIN: CPT | Performed by: PHYSICAL THERAPIST

## 2025-05-15 PROCEDURE — 94761 N-INVAS EAR/PLS OXIMETRY MLT: CPT

## 2025-05-15 PROCEDURE — 70450 CT HEAD/BRAIN W/O DYE: CPT

## 2025-05-15 PROCEDURE — 63710000001 INSULIN LISPRO (HUMAN) PER 5 UNITS: Performed by: STUDENT IN AN ORGANIZED HEALTH CARE EDUCATION/TRAINING PROGRAM

## 2025-05-15 PROCEDURE — 97530 THERAPEUTIC ACTIVITIES: CPT | Performed by: PHYSICAL THERAPIST

## 2025-05-15 PROCEDURE — 85610 PROTHROMBIN TIME: CPT | Performed by: STUDENT IN AN ORGANIZED HEALTH CARE EDUCATION/TRAINING PROGRAM

## 2025-05-15 PROCEDURE — 85027 COMPLETE CBC AUTOMATED: CPT | Performed by: STUDENT IN AN ORGANIZED HEALTH CARE EDUCATION/TRAINING PROGRAM

## 2025-05-15 RX ADMIN — CEPHALEXIN 500 MG: 500 CAPSULE ORAL at 08:37

## 2025-05-15 RX ADMIN — INSULIN LISPRO 3 UNITS: 100 INJECTION, SOLUTION INTRAVENOUS; SUBCUTANEOUS at 21:56

## 2025-05-15 RX ADMIN — INSULIN LISPRO 2 UNITS: 100 INJECTION, SOLUTION INTRAVENOUS; SUBCUTANEOUS at 17:56

## 2025-05-15 RX ADMIN — Medication 4 ML: at 08:08

## 2025-05-15 RX ADMIN — CEPHALEXIN 500 MG: 500 CAPSULE ORAL at 20:42

## 2025-05-15 RX ADMIN — INSULIN LISPRO 2 UNITS: 100 INJECTION, SOLUTION INTRAVENOUS; SUBCUTANEOUS at 12:14

## 2025-05-15 RX ADMIN — IPRATROPIUM BROMIDE AND ALBUTEROL SULFATE 3 ML: .5; 3 SOLUTION RESPIRATORY (INHALATION) at 08:07

## 2025-05-15 RX ADMIN — BUDESONIDE 0.5 MG: 0.5 INHALANT RESPIRATORY (INHALATION) at 08:07

## 2025-05-15 RX ADMIN — GUAIFENESIN 1200 MG: 600 TABLET, EXTENDED RELEASE ORAL at 08:37

## 2025-05-15 RX ADMIN — Medication 10 ML: at 08:37

## 2025-05-15 RX ADMIN — IPRATROPIUM BROMIDE AND ALBUTEROL SULFATE 3 ML: .5; 3 SOLUTION RESPIRATORY (INHALATION) at 16:11

## 2025-05-15 RX ADMIN — MENTHOL, ZINC OXIDE 1 APPLICATION: .44; 20.6 OINTMENT TOPICAL at 08:37

## 2025-05-15 RX ADMIN — IPRATROPIUM BROMIDE AND ALBUTEROL SULFATE 3 ML: .5; 3 SOLUTION RESPIRATORY (INHALATION) at 20:59

## 2025-05-15 RX ADMIN — ARFORMOTEROL TARTRATE 15 MCG: 15 SOLUTION RESPIRATORY (INHALATION) at 21:00

## 2025-05-15 RX ADMIN — PREDNISONE 5 MG: 5 TABLET ORAL at 08:37

## 2025-05-15 RX ADMIN — GUAIFENESIN 1200 MG: 600 TABLET, EXTENDED RELEASE ORAL at 20:42

## 2025-05-15 RX ADMIN — ROSUVASTATIN CALCIUM 20 MG: 20 TABLET, FILM COATED ORAL at 20:42

## 2025-05-15 RX ADMIN — Medication 4 ML: at 20:59

## 2025-05-15 RX ADMIN — POTASSIUM CHLORIDE 40 MEQ: 1500 TABLET, EXTENDED RELEASE ORAL at 00:09

## 2025-05-15 RX ADMIN — CEPHALEXIN 500 MG: 500 CAPSULE ORAL at 17:56

## 2025-05-15 RX ADMIN — IPRATROPIUM BROMIDE AND ALBUTEROL SULFATE 3 ML: .5; 3 SOLUTION RESPIRATORY (INHALATION) at 11:15

## 2025-05-15 RX ADMIN — ARFORMOTEROL TARTRATE 15 MCG: 15 SOLUTION RESPIRATORY (INHALATION) at 08:07

## 2025-05-15 RX ADMIN — MENTHOL, ZINC OXIDE 1 APPLICATION: .44; 20.6 OINTMENT TOPICAL at 21:33

## 2025-05-15 RX ADMIN — BUDESONIDE 0.5 MG: 0.5 INHALANT RESPIRATORY (INHALATION) at 21:00

## 2025-05-15 NOTE — PLAN OF CARE
Goal Outcome Evaluation:      Patient A&O x4, 2L nasal cannula, normal paced rhythm and purewick in place.      Problem: Adult Inpatient Plan of Care  Goal: Plan of Care Review  Outcome: Progressing  Goal: Patient-Specific Goal (Individualized)  Outcome: Progressing  Goal: Absence of Hospital-Acquired Illness or Injury  Outcome: Progressing  Intervention: Identify and Manage Fall Risk  Recent Flowsheet Documentation  Taken 5/15/2025 0421 by See Hunter RN  Safety Promotion/Fall Prevention:   safety round/check completed   room organization consistent   nonskid shoes/slippers when out of bed   fall prevention program maintained   clutter free environment maintained  Taken 5/15/2025 0229 by See Hunter RN  Safety Promotion/Fall Prevention:   safety round/check completed   room organization consistent   nonskid shoes/slippers when out of bed   fall prevention program maintained   clutter free environment maintained  Taken 5/15/2025 0015 by See Hunter RN  Safety Promotion/Fall Prevention:   safety round/check completed   room organization consistent   nonskid shoes/slippers when out of bed   fall prevention program maintained   clutter free environment maintained  Taken 5/14/2025 2217 by See Hunter RN  Safety Promotion/Fall Prevention:   room organization consistent   safety round/check completed   nonskid shoes/slippers when out of bed   fall prevention program maintained   clutter free environment maintained  Taken 5/14/2025 2027 by See Hunter RN  Safety Promotion/Fall Prevention:   safety round/check completed   room organization consistent   nonskid shoes/slippers when out of bed   fall prevention program maintained   clutter free environment maintained  Intervention: Prevent Skin Injury  Recent Flowsheet Documentation  Taken 5/15/2025 0421 by See Hunter RN  Body Position:   lower extremity elevated   supine   weight shifting  Taken 5/15/2025 0229 by See Hunter RN  Body Position:    lower extremity elevated   upper extremity elevated   weight shifting  Taken 5/15/2025 0015 by See Hunter RN  Body Position:   lower extremity elevated   weight shifting  Taken 5/14/2025 2217 by See Hunter RN  Body Position:   supine   lower extremity elevated   tilted   left  Taken 5/14/2025 2027 by See Hunter RN  Body Position:   sitting up in bed   weight shifting   lower extremity elevated  Intervention: Prevent and Manage VTE (Venous Thromboembolism) Risk  Recent Flowsheet Documentation  Taken 5/15/2025 0015 by See Hunter RN  VTE Prevention/Management:   bilateral   SCDs (sequential compression devices) on  Taken 5/14/2025 2027 by See Hunter RN  VTE Prevention/Management:   bilateral   SCDs (sequential compression devices) on  Intervention: Prevent Infection  Recent Flowsheet Documentation  Taken 5/15/2025 0421 by See Hunter RN  Infection Prevention:   single patient room provided   rest/sleep promoted   hand hygiene promoted  Taken 5/15/2025 0229 by See Hunter RN  Infection Prevention:   single patient room provided   rest/sleep promoted   hand hygiene promoted  Taken 5/15/2025 0015 by See Hunter RN  Infection Prevention:   single patient room provided   rest/sleep promoted   hand hygiene promoted  Taken 5/14/2025 2217 by See Hunter RN  Infection Prevention:   single patient room provided   rest/sleep promoted   hand hygiene promoted  Taken 5/14/2025 2027 by See Hunter RN  Infection Prevention:   rest/sleep promoted   single patient room provided   hand hygiene promoted  Goal: Optimal Comfort and Wellbeing  Outcome: Progressing  Intervention: Provide Person-Centered Care  Recent Flowsheet Documentation  Taken 5/15/2025 0015 by See Hunter RN  Trust Relationship/Rapport:   care explained   choices provided   thoughts/feelings acknowledged  Taken 5/14/2025 2027 by See Hunter RN  Trust Relationship/Rapport:   care explained   choices provided    thoughts/feelings acknowledged  Goal: Readiness for Transition of Care  Outcome: Progressing     Problem: Skin Injury Risk Increased  Goal: Skin Health and Integrity  Outcome: Progressing  Intervention: Optimize Skin Protection  Recent Flowsheet Documentation  Taken 5/15/2025 0421 by See Hunter RN  Head of Bed (HOB) Positioning: HOB at 20-30 degrees  Taken 5/15/2025 0229 by See Hunter RN  Head of Bed (HOB) Positioning: HOB at 20-30 degrees  Taken 5/15/2025 0015 by See Hunter RN  Head of Bed (HOB) Positioning: HOB at 20-30 degrees  Taken 5/14/2025 2217 by See Hunter RN  Head of Bed (HOB) Positioning: HOB at 20-30 degrees  Taken 5/14/2025 2027 by See Hunter RN  Head of Bed (HOB) Positioning: HOB at 20-30 degrees     Problem: Fall Injury Risk  Goal: Absence of Fall and Fall-Related Injury  Outcome: Progressing  Intervention: Identify and Manage Contributors  Recent Flowsheet Documentation  Taken 5/15/2025 0421 by See Hunter RN  Medication Review/Management:   medications reviewed   high-risk medications identified  Taken 5/15/2025 0229 by See Hunter RN  Medication Review/Management:   medications reviewed   high-risk medications identified  Taken 5/14/2025 2217 by See Hunter RN  Medication Review/Management:   medications reviewed   high-risk medications identified  Taken 5/14/2025 2027 by See Hunter RN  Medication Review/Management:   medications reviewed   high-risk medications identified  Intervention: Promote Injury-Free Environment  Recent Flowsheet Documentation  Taken 5/15/2025 0421 by See Hunter RN  Safety Promotion/Fall Prevention:   safety round/check completed   room organization consistent   nonskid shoes/slippers when out of bed   fall prevention program maintained   clutter free environment maintained  Taken 5/15/2025 0229 by See Hunter RN  Safety Promotion/Fall Prevention:   safety round/check completed   room organization consistent   nonskid  shoes/slippers when out of bed   fall prevention program maintained   clutter free environment maintained  Taken 5/15/2025 0015 by See Hunter RN  Safety Promotion/Fall Prevention:   safety round/check completed   room organization consistent   nonskid shoes/slippers when out of bed   fall prevention program maintained   clutter free environment maintained  Taken 5/14/2025 2217 by See Hunter RN  Safety Promotion/Fall Prevention:   room organization consistent   safety round/check completed   nonskid shoes/slippers when out of bed   fall prevention program maintained   clutter free environment maintained  Taken 5/14/2025 2027 by See Hunter RN  Safety Promotion/Fall Prevention:   safety round/check completed   room organization consistent   nonskid shoes/slippers when out of bed   fall prevention program maintained   clutter free environment maintained     Problem: Comorbidity Management  Goal: Maintenance of Behavioral Health Symptom Control  Outcome: Progressing  Intervention: Maintain Behavioral Health Symptom Control  Recent Flowsheet Documentation  Taken 5/15/2025 0421 by See Hunter RN  Medication Review/Management:   medications reviewed   high-risk medications identified  Taken 5/15/2025 0229 by See Hunter RN  Medication Review/Management:   medications reviewed   high-risk medications identified  Taken 5/14/2025 2217 by See Hunter RN  Medication Review/Management:   medications reviewed   high-risk medications identified  Taken 5/14/2025 2027 by See Hunter RN  Medication Review/Management:   medications reviewed   high-risk medications identified  Goal: Maintenance of COPD Symptom Control  Outcome: Progressing  Intervention: Maintain COPD (Chronic Obstructive Pulmonary Disease) Symptom Control  Recent Flowsheet Documentation  Taken 5/15/2025 0421 by See Hunter RN  Medication Review/Management:   medications reviewed   high-risk medications identified  Taken 5/15/2025 0229  by See Hunter RN  Medication Review/Management:   medications reviewed   high-risk medications identified  Taken 5/14/2025 2217 by See Hunter RN  Medication Review/Management:   medications reviewed   high-risk medications identified  Taken 5/14/2025 2027 by See Hunter RN  Medication Review/Management:   medications reviewed   high-risk medications identified  Goal: Blood Glucose Level Within Target Range  Outcome: Progressing  Intervention: Monitor and Manage Glycemia  Recent Flowsheet Documentation  Taken 5/15/2025 0421 by See Hunter RN  Medication Review/Management:   medications reviewed   high-risk medications identified  Taken 5/15/2025 0229 by See Hunter RN  Medication Review/Management:   medications reviewed   high-risk medications identified  Taken 5/14/2025 2217 by See Hunter RN  Medication Review/Management:   medications reviewed   high-risk medications identified  Taken 5/14/2025 2027 by See Hunter RN  Medication Review/Management:   medications reviewed   high-risk medications identified  Goal: Maintenance of Heart Failure Symptom Control  Outcome: Progressing  Intervention: Maintain Heart Failure Management  Recent Flowsheet Documentation  Taken 5/15/2025 0421 by See Hunter RN  Medication Review/Management:   medications reviewed   high-risk medications identified  Taken 5/15/2025 0229 by See Hunter RN  Medication Review/Management:   medications reviewed   high-risk medications identified  Taken 5/14/2025 2217 by See Hunter RN  Medication Review/Management:   medications reviewed   high-risk medications identified  Taken 5/14/2025 2027 by See Hunter RN  Medication Review/Management:   medications reviewed   high-risk medications identified  Goal: Blood Pressure in Desired Range  Outcome: Progressing  Intervention: Maintain Blood Pressure Management  Recent Flowsheet Documentation  Taken 5/15/2025 0421 by See Hunter RN  Medication  Review/Management:   medications reviewed   high-risk medications identified  Taken 5/15/2025 0229 by See Hunter RN  Medication Review/Management:   medications reviewed   high-risk medications identified  Taken 5/14/2025 2217 by See Hunter RN  Medication Review/Management:   medications reviewed   high-risk medications identified  Taken 5/14/2025 2027 by See Hunter RN  Medication Review/Management:   medications reviewed   high-risk medications identified  Goal: Maintenance of Osteoarthritis Symptom Control  Outcome: Progressing  Intervention: Maintain Osteoarthritis Symptom Control  Recent Flowsheet Documentation  Taken 5/15/2025 0421 by See Hunter RN  Medication Review/Management:   medications reviewed   high-risk medications identified  Taken 5/15/2025 0229 by See Hunter RN  Medication Review/Management:   medications reviewed   high-risk medications identified  Taken 5/14/2025 2217 by See Hunter RN  Medication Review/Management:   medications reviewed   high-risk medications identified  Taken 5/14/2025 2027 by See Hunter RN  Medication Review/Management:   medications reviewed   high-risk medications identified     Problem: Sepsis/Septic Shock  Goal: Optimal Coping  Outcome: Progressing  Goal: Absence of Bleeding  Outcome: Progressing  Goal: Blood Glucose Level Within Target Range  Outcome: Progressing  Goal: Absence of Infection Signs and Symptoms  Outcome: Progressing  Intervention: Initiate Sepsis Management  Recent Flowsheet Documentation  Taken 5/15/2025 0421 by See Hunter RN  Infection Prevention:   single patient room provided   rest/sleep promoted   hand hygiene promoted  Taken 5/15/2025 0229 by See Hunter RN  Infection Prevention:   single patient room provided   rest/sleep promoted   hand hygiene promoted  Taken 5/15/2025 0015 by See Hunter RN  Infection Prevention:   single patient room provided   rest/sleep promoted   hand hygiene promoted  Taken  5/14/2025 2217 by See Hunter, RN  Infection Prevention:   single patient room provided   rest/sleep promoted   hand hygiene promoted  Taken 5/14/2025 2027 by See Hunter, RN  Infection Prevention:   rest/sleep promoted   single patient room provided   hand hygiene promoted  Goal: Optimal Nutrition Delivery  Outcome: Progressing

## 2025-05-15 NOTE — PLAN OF CARE
Goal Outcome Evaluation:  Plan of Care Reviewed With: patient           Outcome Evaluation: Pt presented from SNF after a fall from the Rocio lift. She was admitted with a closed head injury, bruises and skin tears. Pt reports staff used a rocio lift to assist with mobility out of the shower. At baseline, pt ambulates well with Rwx. She reports that recently at SNF, she has required increased assistance to come to standing, but ambulates well with Rwx once she is standing. Pt required mod A for bed mobiltiy. SHe stood with mod-max A with posterior LOB noted. Once standing, pt was able to regain her balance and ambulate a short distance with Rwx. Pt presents with generalized weakness, pain in BLE< impaired balance and decreased functional mobility. SHe would benefit from PT to address these impairments. She plans to return to SNF when medically stable.    Anticipated Discharge Disposition (PT): skilled nursing facility

## 2025-05-15 NOTE — PLAN OF CARE
Goal Outcome Evaluation:  Plan of Care Reviewed With: patient           Outcome Evaluation: VSS, dressing changed to Manan leg skin tears, INR and WBC remains elevated, MD aware, CT head ordered, bed alarm on, call light in reach

## 2025-05-15 NOTE — THERAPY EVALUATION
Patient Name: Caitlyn Longoria  : 1934    MRN: [id number]                              Today's Date: 5/15/2025       Admit Date: 2025    Visit Dx:     ICD-10-CM ICD-9-CM   1. Laceration of scalp, initial encounter  S01.01XA 873.0   2. Skin tear of left lower leg without complication, initial encounter  S81.812A 891.0   3. Supratherapeutic INR  R79.1 790.92     Patient Active Problem List   Diagnosis    Neck and shoulder pain    Arthropathy of shoulder region    Carpal tunnel syndrome of left wrist    Chronic pain of both shoulders    Chronic left shoulder pain    Arthropathy of left shoulder    Dysarthria    Type 2 diabetes mellitus    Paroxysmal atrial fibrillation    HLD (hyperlipidemia)    Chronic bronchitis    Coronary artery disease    CVA (cerebral vascular accident)    HTN (hypertension)    CKD (chronic kidney disease), stage III    Chronic combined systolic (congestive) and diastolic (congestive) heart failure    Infection of prosthetic right knee joint    Stasis dermatitis of right lower extremity due to peripheral venous hypertension    Current use of long term anticoagulation    Morbid obesity    Acute respiratory failure with hypoxia    Rhinovirus    Asthma with acute exacerbation    Acute respiratory failure with hypoxemia    Viral pneumonia    Hypoxia    CLL (chronic lymphocytic leukemia)    Dyspnea    Anticoagulated on Coumadin    Osteoporotic compression fracture of spine    Pneumonia due to gram-negative bacteria    Pneumonia due to infectious organism    Bilateral carotid artery disease    Hypogammaglobulinemia    Hypogammaglobulinemia    Cellulitis of right lower extremity    Hypokalemia    Nocturnal hypoxia    COPD (chronic obstructive pulmonary disease)    Upper respiratory tract infection    COPD (chronic obstructive pulmonary disease)    Sick sinus syndrome    Anemia    Thrombocytopenia    Chronic HFrEF (heart failure with reduced ejection fraction)    Cellulitis of left lower  leg    Acute exacerbation of COPD with asthma    Leukocytosis    COPD exacerbation    Moderate protein-calorie malnutrition    Closed head injury    Thoracic compression fracture, closed, initial encounter     Past Medical History:   Diagnosis Date    Aortic calcification     mild, on echo 12/17/2017    Aortic regurgitation     Trace    Asthma     Atrial fibrillation     CAD (coronary artery disease)     Carpal tunnel syndrome of left wrist     Chronic combined systolic and diastolic congestive heart failure     CKD (chronic kidney disease) stage 3, GFR 30-59 ml/min     COPD (chronic obstructive pulmonary disease)     Coronary artery disease involving native coronary artery of native heart with angina pectoris     Disc degeneration, lumbar     Diverticulosis     DM type 2 (diabetes mellitus, type 2)     Dyslipidemia     GERD (gastroesophageal reflux disease)     History of aneurysm     right femoral artery s/p LHC    History of blood transfusion     History of fracture     History of heart attack     History of vitamin D deficiency     Hyperlipidemia     Hypertension     Leukemia     Mild mitral regurgitation     Mitral annular calcification     12/8/2017- echo, moderate    Osteopenia     PAF (paroxysmal atrial fibrillation)     Peripheral neuropathy     Skin cancer     Left hand    Sleep apnea     bipap    SSS (sick sinus syndrome)     Stroke (cerebrum)     TIA (transient ischemic attack) 2017    Tricuspid regurgitation     Trace     Past Surgical History:   Procedure Laterality Date    BRONCHOSCOPY Bilateral 10/6/2020    Procedure: BRONCHOSCOPY with BILATERAL LUNG washings;  Surgeon: Juno Coburn MD;  Location: [facility] ENDOSCOPY;  Service: Pulmonary;  Laterality: Bilateral;  PRE: purulent bronchitis  POST: PURULENT BRONCHITIS    BRONCHOSCOPY Bilateral 10/9/2020    Procedure: BRONCHOSCOPY with washing;  Surgeon: Juno Coburn MD;  Location: [facility] ENDOSCOPY;  Service: Pulmonary;  Laterality: Bilateral;   pre/post - mucous plug      CARDIAC CATHETERIZATION      CARDIAC ELECTROPHYSIOLOGY PROCEDURE N/A 2/7/2020    Procedure: PPM generator change - dual  medtronic;  Surgeon: Kiel Field MD;  Location: [facility] CATH INVASIVE LOCATION;  Service: Cardiology;  Laterality: N/A;    CHOLECYSTECTOMY      CORONARY STENT PLACEMENT      ENDOSCOPY N/A 9/14/2022    Procedure: ESOPHAGOGASTRODUODENOSCOPY with 54fr main dilatation;  Surgeon: Enio Cota MD;  Location: [facility] ENDOSCOPY;  Service: Gastroenterology;  Laterality: N/A;  pre - dysphagia  post - s/p dilatation, watermelon stomach    HERNIA REPAIR      hital hernia    HYSTERECTOMY      PACEMAKER IMPLANTATION      REPLACEMENT TOTAL KNEE Bilateral       General Information       Row Name 05/15/25 1726          Physical Therapy Time and Intention    Document Type evaluation  -     Mode of Treatment individual therapy;physical therapy  -       Row Name 05/15/25 1726          General Information    Patient Profile Reviewed yes  -     Prior Level of Function independent:  walks with walker with cga. Requires mod A to come to standing  -     Existing Precautions/Restrictions --  skin tears to back of BLE  -       Row Name 05/15/25 1726          Living Environment    People in Home --  admitted from SNF and plans to return  -       Row Name 05/15/25 1726          Cognition    Orientation Status (Cognition) oriented x 4  -       Row Name 05/15/25 1726          Safety Issues/Impairments Affecting Functional Mobility    Impairments Affecting Function (Mobility) balance;endurance/activity tolerance;pain;strength;range of motion (ROM)  -               User Key  (r) = Recorded By, (t) = Taken By, (c) = Cosigned By      Initials Name Provider Type     Lachelle Doan, PT Physical Therapist                   Mobility       Row Name 05/15/25 1729          Bed Mobility    Bed Mobility supine-sit;sit-supine  -     Supine-Sit [place] (Bed Mobility)  moderate assist (50% patient effort)  -     Sit-Supine [place] (Bed Mobility) moderate assist (50% patient effort);2 person assist  -[facility] Name 05/15/25 1729          Sit-Stand Transfer    Sit-Stand [place] (Transfers) moderate assist (50% patient effort);maximum assist (25% patient effort)  -     Assistive Device (Sit-Stand Transfers) walker, front-wheeled  -     Comment, (Sit-Stand Transfer) STS x3, retropulsion when coming to stand  -[facility] Name 05/15/25 1729          Gait/Stairs (Locomotion)    [place] Level (Gait) minimum assist (75% patient effort)  -     Assistive Device (Gait) walker, front-wheeled  -     Distance in Feet (Gait) 10  x2 with seated rest break  -     Deviations/Abnormal Patterns (Gait) antalgic;gait speed decreased;stride length decreased  -     Bilateral Gait Deviations forward flexed posture;heel strike decreased  -               User Key  (r) = Recorded By, (t) = Taken By, (c) = Cosigned By      Initials Name Provider Type    Lachelle Arcos, CHERRY Physical Therapist                   Obj/Interventions       [facility] Name 05/15/25 1730          Range of Motion Comprehensive    Comment, General Range of Motion BLE limited by pain  -KH       Row Name 05/15/25 1730          Strength Comprehensive (MMT)    Comment, General Manual Muscle Testing (MMT) Assessment generalized weakness, can lift against gravity  -KH       Row Name 05/15/25 1730          Motor Skills    Therapeutic Exercise --  BLE AP,. LAQ x 10 reps  -               User Key  (r) = Recorded By, (t) = Taken By, (c) = Cosigned By      Initials Name Provider Type    Lachelle Arcos, PT Physical Therapist                   Goals/Plan       [facility] Name 05/15/25 1737          Bed Mobility Goal 1 (PT)    Activity/Assistive Device (Bed Mobility Goal 1, PT) bed mobility activities, all  -     [place] Level/Cues Needed (Bed Mobility Goal 1, PT) minimum assist (75% or more  patient effort)  -     Time Frame (Bed Mobility Goal 1, PT) 10 days  -       Row Name 05/15/25 1737          Transfer Goal 1 (PT)    Activity/Assistive Device (Transfer Goal 1, PT) sit-to-stand/stand-to-sit;bed-to-chair/chair-to-bed  -     [place] Level/Cues Needed (Transfer Goal 1, PT) minimum assist (75% or more patient effort)  -     Time Frame (Transfer Goal 1, PT) 10 days  -       Row Name 05/15/25 1737          Gait Training Goal 1 (PT)    Activity/Assistive Device (Gait Training Goal 1, PT) gait (walking locomotion);walker, rolling  -     [place] Level (Gait Training Goal 1, PT) contact guard required  -     Distance (Gait Training Goal 1, PT) 50 ft  -     Time Frame (Gait Training Goal 1, PT) 10 days  -       Row Name 05/15/25 1737          Patient Education Goal (PT)    Activity (Patient Education Goal, PT) HEP  -     [place]/Cues/Accuracy (Memory Goal 2, PT) demonstrates adequately  -     Time Frame (Patient Education Goal, PT) 10 days  -       Row Name 05/15/25 1737          Therapy Assessment/Plan (PT)    Planned Therapy Interventions (PT) balance training;bed mobility training;gait training;home exercise program;patient/family education;transfer training;strengthening;ROM (range of motion)  -               User Key  (r) = Recorded By, (t) = Taken By, (c) = Cosigned By      Initials Name Provider Type     Lachelle Doan, PT Physical Therapist                   Clinical Impression       Row Name 05/15/25 1731          Pain    Pre/Posttreatment Pain Comment BLE pain secondary to skin tears, no number given  -[facility] Name 05/15/25 1731          Plan of Care Review    Plan of Care Reviewed With patient  -     Outcome Evaluation Pt presented from SNF after a fall from the Rocio lift. She was admitted with a closed head injury, bruises and skin tears. Pt reports staff used a rocio lift to assist with mobility out of the shower. At baseline, pt  ambulates well with Rwx. She reports that recently at SNF, she has required increased assistance to come to standing, but ambulates well with Rwx once she is standing. Pt required mod A for bed mobiltiy. SHe stood with mod-max A with posterior LOB noted. Once standing, pt was able to regain her balance and ambulate a short distance with Rwx. Pt presents with generalized weakness, pain in BLE< impaired balance and decreased functional mobility. SHe would benefit from PT to address these impairments. She plans to return to SNF when medically stable.  -       Row Name 05/15/25 1731          Therapy Assessment/Plan (PT)    Patient/Family Therapy Goals Statement (PT) return to [facility]  -     Rehab Potential (PT) good  -     Criteria for Skilled Interventions Met (PT) yes  -     Therapy Frequency (PT) 5 times/wk  -       Row Name 05/15/25 1731          Vital Signs    Pre SpO2 (%) 100  -KH     O2 Delivery Pre Treatment supplemental O2  -KH     Intra SpO2 (%) 96  -KH     O2 Delivery Intra Treatment supplemental O2  -KH     Post SpO2 (%) 98  -KH     O2 Delivery Post Treatment supplemental O2  -KH       Row Name 05/15/25 1731          Positioning and Restraints    Pre-Treatment Position in bed  -KH     Post Treatment Position bed  -KH     In Bed fowlers;call light within reach;encouraged to call for assist;exit alarm on;heels elevated;legs elevated  -               User Key  (r) = Recorded By, (t) = Taken By, (c) = Cosigned By      Initials Name Provider Type    Lachelle Arcos, PT Physical Therapist                   Outcome Measures       Row Name 05/15/25 1738 05/15/25 0837       How much help from another person do you currently need...    Turning from your back to your side while in flat bed without using bedrails? 2  -KH 2  -AB    Moving from lying on back to sitting on the side of a flat bed without bedrails? 2  -KH 2  -AB    Moving to and from a bed to a chair (including a wheelchair)? 3  -KH 1   -AB    Standing up from a chair using your arms (e.g., wheelchair, bedside chair)? 2  -KH 1  -AB    Climbing 3-5 steps with a railing? 1  -KH 1  -AB    To walk in hospital room? 3  -KH 1  -AB    AM-PAC 6 Clicks Score (PT) 13  -KH 8  -AB    Highest Level of Mobility Goal Move to Chair/Commode-4  -KH Sit at Edge of Bed-3  -AB      Row Name 05/15/25 1738          Functional Assessment    Outcome Measure Options AM-PAC 6 Clicks Basic Mobility (PT)  -               User Key  (r) = Recorded By, (t) = Taken By, (c) = Cosigned By      Initials Name Provider Type    Lachelle Arcos, CHERRY Physical Therapist    George Mendez RN Registered Nurse                                 Physical Therapy Education       Title: PT OT SLP Therapies (In Progress)       Topic: Physical Therapy (Done)       Point: Mobility training (Done)       Learning Progress Summary            Patient Acceptance, E, VU,NR by  at 5/15/2025 1739                      Point: Home exercise program (Done)       Learning Progress Summary            Patient Acceptance, E, VU,NR by  at 5/15/2025 1739                      Point: Body mechanics (Done)       Learning Progress Summary            Patient Acceptance, E, VU,NR by  at 5/15/2025 1739                      Point: Precautions (Done)       Learning Progress Summary            Patient Acceptance, E, VU,NR by  at 5/15/2025 1739                                      User Key       Initials Effective Dates Name Provider Type [facility] 07/11/23 -  Lachelle Doan, PT Physical Therapist PT                  PT Recommendation and Plan  Planned Therapy Interventions (PT): balance training, bed mobility training, gait training, home exercise program, patient/family education, transfer training, strengthening, ROM (range of motion)  Outcome Evaluation: Pt presented from SNF after a fall from the Rocio lift. She was admitted with a closed head injury, bruises and skin tears. Pt  reports staff used a anika lift to assist with mobility out of the shower. At baseline, pt ambulates well with Rwx. She reports that recently at SNF, she has required increased assistance to come to standing, but ambulates well with Rwx once she is standing. Pt required mod A for bed mobiltiy. SHe stood with mod-max A with posterior LOB noted. Once standing, pt was able to regain her balance and ambulate a short distance with Rwx. Pt presents with generalized weakness, pain in BLE< impaired balance and decreased functional mobility. SHe would benefit from PT to address these impairments. She plans to return to SNF when medically stable.     Time Calculation:         PT Charges       Row Name 05/15/25 1739             Time Calculation    Start Time 1450  -      Stop Time 1528  -KH      Time Calculation (min) 38 min  -KH      PT Received On 05/15/25  -      PT - Next Appointment 05/16/25  -      PT Goal Re-Cert Due Date 05/25/25  -         Time Calculation- PT    Total Timed Code Minutes- PT 23 minute(s)  -         Timed Charges    16097 - PT Therapeutic Activity Minutes 23  -KH         Total Minutes    Timed Charges Total Minutes 23  -KH       Total Minutes 23  -KH                User Key  (r) = Recorded By, (t) = Taken By, (c) = Cosigned By      Initials Name Provider Type    Lachelle Arcos PT Physical Therapist                  Therapy Charges for Today       Code Description Service Date Service Provider Modifiers Qty    [id number]  PT THERAPEUTIC ACT EA 15 MIN 5/15/2025 Lachelle Doan, PT GP 2    [id number]  PT EVAL MOD COMPLEXITY 3 5/15/2025 Lachelle Doan, PT GP 1            PT G-Codes  Outcome Measure Options: AM-PAC 6 Clicks Basic Mobility (PT)  AM-PAC 6 Clicks Score (PT): 13  PT Discharge Summary  Anticipated Discharge Disposition (PT): skilled nursing facility    Lachelle Doan, CHERRY  5/15/2025

## 2025-05-15 NOTE — PROGRESS NOTES
Name: Caitlyn Longoria ADMIT: 2025   : 1934  PCP: Hollie Donnelly MD    MRN: [id number] LOS: 0 days   AGE/SEX: 89 y.o. female  ROOM: [facility]     Subjective   No new complaints today. Denies any headache. She does endorse weakness.     Objective   Objective   Vital Signs  Temp:  [97.5 °F (36.4 °C)-98.5 °F (36.9 °C)] 98 °F (36.7 °C)  Heart Rate:  [80-82] 82  Resp:  [16-18] 18  BP: ()/(59-82) 116/72  SpO2:  [94 %-100 %] 98 %  on  Flow (L/min) (Oxygen Therapy):  [2] 2;   Device (Oxygen Therapy): nasal cannula  Body mass index is 31.69 kg/m².  Physical Exam  Constitutional:       General: She is not in acute distress.  HENT:      Head: Normocephalic and atraumatic.   Cardiovascular:      Rate and Rhythm: Normal rate and regular rhythm.   Pulmonary:      Effort: Pulmonary effort is normal. No respiratory distress.   Abdominal:      General: There is no distension.      Palpations: Abdomen is soft.      Tenderness: There is no abdominal tenderness.   Skin:     General: Skin is warm and dry.   Neurological:      Mental Status: She is alert and oriented to person, place, and time.      Motor: Weakness present.         Results Review     I reviewed the patient's new clinical results.  Results from last 7 days   Lab Units 05/15/25  0609 25  0548   WBC 10*3/mm3 37.29* 42.04*   HEMOGLOBIN g/dL 12.5 12.0   PLATELETS 10*3/mm3 125* 145     Results from last 7 days   Lab Units 05/15/25  0609 25  0548   SODIUM mmol/L 137 139   POTASSIUM mmol/L 4.6 3.4*   CHLORIDE mmol/L 105 102   CO2 mmol/L 24.5 27.7   BUN mg/dL 18 24*   CREATININE mg/dL 0.59 0.61   GLUCOSE mg/dL 96 73   Estimated Creatinine Clearance: 63.9 mL/min (by C-G formula based on SCr of 0.59 mg/dL).  Results from last 7 days   Lab Units 05/15/25  0609   ALBUMIN g/dL 3.0*   BILIRUBIN mg/dL 0.7   ALK PHOS U/L 182*   AST (SGOT) U/L 25   ALT (SGPT) U/L 26     Results from last 7 days   Lab Units 05/15/25  0609 25  0548   CALCIUM mg/dL 7.8*  7.7*   ALBUMIN g/dL 3.0*  --        COVID19   Date Value Ref Range Status   05/01/2025 Not Detected Not Detected - Ref. Range Final   02/25/2025 Not Detected Not Detected - Ref. Range Final     Glucose   Date/Time Value Ref Range Status   05/15/2025 1206 188 (H) 70 - 130 mg/dL Final   05/15/2025 0807 126 70 - 130 mg/dL Final   05/14/2025 2041 184 (H) 70 - 130 mg/dL Final   05/14/2025 1537 125 70 - 130 mg/dL Final   05/14/2025 1119 98 70 - 130 mg/dL Final   05/14/2025 0751 76 70 - 130 mg/dL Final   05/14/2025 0701 69 (L) 70 - 130 mg/dL Final     Results for orders placed or performed during the hospital encounter of 04/25/25   Blood Culture - Blood, Arm, Left    Specimen: Arm, Left; Blood   Result Value Ref Range    Blood Culture No growth at 5 days          CT Cervical Spine Without Contrast  Narrative: CT OF THE CERVICAL SPINE     HISTORY: Fall     COMPARISON: April 14, 2024     TECHNIQUE: Axial CT imaging was obtained through the cervical spine.  Coronal and sagittal reformatted images were obtained.     FINDINGS:  No acute fracture or subluxation of the cervical spine is seen. The  patient is noted to have compression deformity of T4. This was not  clearly identified on exam from January 6, 2025. It remains age  indeterminate. There is anterolisthesis of C6 on C7, which was also  present on prior exam. Intervertebral disc space narrowing is most  significant at C4-C5 and C5-C6. There is no prevertebral soft tissue  swelling. Advanced degenerative changes are noted at the atlantoaxial  junction. Images through the lung apices demonstrate some mild biapical  scarring. There is minimal canal stenosis and neural foraminal  narrowing.     Impression:    1. No acute fracture or subluxation of the cervical spine is seen.  2. The patient does have compression deformity of T4, which is age  indeterminate, although favored to be at least subacute.     Radiation dose reduction techniques were utilized, including  automated  exposure control and exposure modulation based on body size.        This report was finalized on 5/13/2025 11:36 PM by Dr. Heidi Garza M.D on Workstation: [id number]     CT Head Without Contrast  Narrative: CT OF THE HEAD WITHOUT CONTRAST     HISTORY: Fall     COMPARISON: January 6, 2025     TECHNIQUE: Axial CT imaging was obtained through the brain. No IV  contrast was administered.     FINDINGS:  No acute intracranial hemorrhage is seen. There is diffuse atrophy.  There is periventricular and deep white matter microangiopathic change.  There is no midline shift or mass effect. No calvarial fracture is seen.  Small mucous retention cyst is noted within the right maxillary sinus.  There is a left parieto-occipital scalp hematoma.     Impression: No acute intracranial findings.     Radiation dose reduction techniques were utilized, including automated  exposure control and exposure modulation based on body size.        This report was finalized on 5/13/2025 11:29 PM by Dr. Heidi Garza M.D on Workstation: [id number]       Scheduled Medications  arformoterol, 15 mcg, Nebulization, BID - RT  budesonide, 0.5 mg, Nebulization, BID  [Held by provider] carvedilol, 3.125 mg, Oral, BID With Meals  cephalexin, 500 mg, Oral, TID  [Held by provider] furosemide, 20 mg, Oral, Daily  guaiFENesin, 1,200 mg, Oral, Q12H  insulin lispro, 2-7 Units, Subcutaneous, 4x Daily AC & at Bedtime  ipratropium-albuterol, 3 mL, Nebulization, 4x Daily - RT  Menthol-Zinc Oxide, 1 Application, Topical, Q12H  miconazole, 1 Application, Topical, Q12H  [START ON 5/16/2025] predniSONE, 2.5 mg, Oral, Daily With Breakfast  rosuvastatin, 20 mg, Oral, Nightly  sodium chloride, 10 mL, Intravenous, Q12H  sodium chloride, 4 mL, Nebulization, BID    Infusions   Diet  Diet: Regular/House; Fluid Consistency: Thin (IDDSI 0)       Assessment/Plan     Active Hospital Problems    Diagnosis  POA    **Closed head injury [S09.90XA]  Yes     Thoracic compression fracture, closed, initial encounter [S22.000A]  Unknown    COPD (chronic obstructive pulmonary disease) [J44.9]  Yes    Chronic HFrEF (heart failure with reduced ejection fraction) [I50.22]  Yes    Sick sinus syndrome [I49.5]  Yes    Anticoagulated on Coumadin [Z79.01]  Not Applicable    CLL (chronic lymphocytic leukemia) [C91.10]  Yes    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       89 y.o. female admitted with Closed head injury.    Mechanical fall  Supratherapeutic INR  Closed head injury  Current INR is 5.23. Hold warfarin. Currently there is no sign of bleeding and no indication for administration of vitamin K  -check Daily INR- downtrending appropriately   -repeat CT head today      CLL  The patient has been on steroids for asthma exacerbation which could be exacerbating the white blood cell count. Continue to monitor. Currently she is afebrile and has been on suppressive cephalexin ever since completing cefpodoxime and doxycycline for skin soft tissue infection.  -previously she has had a WBC increase to around [phone] on prednisone, which I believe accounts for what we are seeing currently      Atrial fibrillation  Sick sinus syndrome   Hold coreg due to soft blood pressure. Hold warfarin due to elevated INR     Chronic systolic and diastolic heart failure  Hold lasix and coreg as above      Type 2 Diabetes  Hold metformin and glipizide   Start SS     COPD   Resume home inhalers         SCDs for DVT prophylaxis. Currently INR supratherapeutic   DNR.  Discussed with patient and nursing staff.  Anticipate discharge to SNU facility in 1-2 days.      Isacc Garcia  MD [facility]  05/15/25  12:22 EDT

## 2025-05-16 LAB
ANION GAP SERPL CALCULATED.3IONS-SCNC: 6.6 MMOL/L (ref 5–15)
BUN SERPL-MCNC: 14 MG/DL (ref 8–23)
BUN/CREAT SERPL: 26.9 (ref 7–25)
CALCIUM SPEC-SCNC: 7.8 MG/DL (ref 8.2–9.6)
CHLORIDE SERPL-SCNC: 109 MMOL/L (ref 98–107)
CO2 SERPL-SCNC: 26.4 MMOL/L (ref 22–29)
CREAT SERPL-MCNC: 0.52 MG/DL (ref 0.57–1)
DEPRECATED RDW RBC AUTO: 49.5 FL (ref 37–54)
EGFRCR SERPLBLD CKD-EPI 2021: 88.4 ML/MIN/1.73
ERYTHROCYTE [DISTWIDTH] IN BLOOD BY AUTOMATED COUNT: 13.8 % (ref 12.3–15.4)
GLUCOSE BLDC GLUCOMTR-MCNC: 103 MG/DL (ref 70–130)
GLUCOSE BLDC GLUCOMTR-MCNC: 191 MG/DL (ref 70–130)
GLUCOSE BLDC GLUCOMTR-MCNC: 194 MG/DL (ref 70–130)
GLUCOSE BLDC GLUCOMTR-MCNC: 212 MG/DL (ref 70–130)
GLUCOSE SERPL-MCNC: 99 MG/DL (ref 65–99)
HCT VFR BLD AUTO: 37.2 % (ref 34–46.6)
HGB BLD-MCNC: 11.7 G/DL (ref 12–15.9)
INR PPP: 4.09 (ref 0.9–1.1)
MCH RBC QN AUTO: 31.1 PG (ref 26.6–33)
MCHC RBC AUTO-ENTMCNC: 31.5 G/DL (ref 31.5–35.7)
MCV RBC AUTO: 98.9 FL (ref 79–97)
PLATELET # BLD AUTO: 125 10*3/MM3 (ref 140–450)
PMV BLD AUTO: 11.3 FL (ref 6–12)
POTASSIUM SERPL-SCNC: 4.1 MMOL/L (ref 3.5–5.2)
PROTHROMBIN TIME: 40.5 SECONDS (ref 11.7–14.2)
RBC # BLD AUTO: 3.76 10*6/MM3 (ref 3.77–5.28)
SODIUM SERPL-SCNC: 142 MMOL/L (ref 136–145)
WBC NRBC COR # BLD AUTO: 33.82 10*3/MM3 (ref 3.4–10.8)

## 2025-05-16 PROCEDURE — 80048 BASIC METABOLIC PNL TOTAL CA: CPT | Performed by: STUDENT IN AN ORGANIZED HEALTH CARE EDUCATION/TRAINING PROGRAM

## 2025-05-16 PROCEDURE — 85027 COMPLETE CBC AUTOMATED: CPT | Performed by: STUDENT IN AN ORGANIZED HEALTH CARE EDUCATION/TRAINING PROGRAM

## 2025-05-16 PROCEDURE — 85610 PROTHROMBIN TIME: CPT | Performed by: STUDENT IN AN ORGANIZED HEALTH CARE EDUCATION/TRAINING PROGRAM

## 2025-05-16 PROCEDURE — 63710000001 PREDNISONE PER 5 MG: Performed by: STUDENT IN AN ORGANIZED HEALTH CARE EDUCATION/TRAINING PROGRAM

## 2025-05-16 PROCEDURE — 94664 DEMO&/EVAL PT USE INHALER: CPT

## 2025-05-16 PROCEDURE — 94799 UNLISTED PULMONARY SVC/PX: CPT

## 2025-05-16 PROCEDURE — 94761 N-INVAS EAR/PLS OXIMETRY MLT: CPT

## 2025-05-16 PROCEDURE — 82948 REAGENT STRIP/BLOOD GLUCOSE: CPT

## 2025-05-16 PROCEDURE — 94760 N-INVAS EAR/PLS OXIMETRY 1: CPT

## 2025-05-16 PROCEDURE — 63710000001 INSULIN LISPRO (HUMAN) PER 5 UNITS: Performed by: STUDENT IN AN ORGANIZED HEALTH CARE EDUCATION/TRAINING PROGRAM

## 2025-05-16 RX ADMIN — Medication 4 ML: at 06:36

## 2025-05-16 RX ADMIN — Medication 4 ML: at 22:16

## 2025-05-16 RX ADMIN — CEPHALEXIN 500 MG: 500 CAPSULE ORAL at 16:37

## 2025-05-16 RX ADMIN — GUAIFENESIN 1200 MG: 600 TABLET, EXTENDED RELEASE ORAL at 20:39

## 2025-05-16 RX ADMIN — IPRATROPIUM BROMIDE AND ALBUTEROL SULFATE 3 ML: .5; 3 SOLUTION RESPIRATORY (INHALATION) at 10:35

## 2025-05-16 RX ADMIN — ARFORMOTEROL TARTRATE 15 MCG: 15 SOLUTION RESPIRATORY (INHALATION) at 22:19

## 2025-05-16 RX ADMIN — Medication 10 ML: at 08:36

## 2025-05-16 RX ADMIN — MENTHOL, ZINC OXIDE 1 APPLICATION: .44; 20.6 OINTMENT TOPICAL at 11:05

## 2025-05-16 RX ADMIN — IPRATROPIUM BROMIDE AND ALBUTEROL SULFATE 3 ML: .5; 3 SOLUTION RESPIRATORY (INHALATION) at 22:11

## 2025-05-16 RX ADMIN — INSULIN LISPRO 2 UNITS: 100 INJECTION, SOLUTION INTRAVENOUS; SUBCUTANEOUS at 16:37

## 2025-05-16 RX ADMIN — PREDNISONE 2.5 MG: 5 TABLET ORAL at 08:13

## 2025-05-16 RX ADMIN — ANTI-FUNGAL POWDER MICONAZOLE NITRATE TALC FREE 1 APPLICATION: 1.42 POWDER TOPICAL at 11:05

## 2025-05-16 RX ADMIN — ARFORMOTEROL TARTRATE 15 MCG: 15 SOLUTION RESPIRATORY (INHALATION) at 06:33

## 2025-05-16 RX ADMIN — INSULIN LISPRO 3 UNITS: 100 INJECTION, SOLUTION INTRAVENOUS; SUBCUTANEOUS at 20:39

## 2025-05-16 RX ADMIN — INSULIN LISPRO 2 UNITS: 100 INJECTION, SOLUTION INTRAVENOUS; SUBCUTANEOUS at 11:46

## 2025-05-16 RX ADMIN — CEPHALEXIN 500 MG: 500 CAPSULE ORAL at 08:14

## 2025-05-16 RX ADMIN — ROSUVASTATIN CALCIUM 20 MG: 20 TABLET, FILM COATED ORAL at 20:39

## 2025-05-16 RX ADMIN — IPRATROPIUM BROMIDE AND ALBUTEROL SULFATE 3 ML: .5; 3 SOLUTION RESPIRATORY (INHALATION) at 14:30

## 2025-05-16 RX ADMIN — BUDESONIDE 0.5 MG: 0.5 INHALANT RESPIRATORY (INHALATION) at 22:20

## 2025-05-16 RX ADMIN — IPRATROPIUM BROMIDE AND ALBUTEROL SULFATE 3 ML: .5; 3 SOLUTION RESPIRATORY (INHALATION) at 06:30

## 2025-05-16 RX ADMIN — MENTHOL, ZINC OXIDE 1 APPLICATION: .44; 20.6 OINTMENT TOPICAL at 20:39

## 2025-05-16 RX ADMIN — Medication 10 ML: at 20:40

## 2025-05-16 RX ADMIN — CEPHALEXIN 500 MG: 500 CAPSULE ORAL at 20:39

## 2025-05-16 RX ADMIN — ANTI-FUNGAL POWDER MICONAZOLE NITRATE TALC FREE 1 APPLICATION: 1.42 POWDER TOPICAL at 20:39

## 2025-05-16 RX ADMIN — GUAIFENESIN 1200 MG: 600 TABLET, EXTENDED RELEASE ORAL at 08:13

## 2025-05-16 RX ADMIN — BUDESONIDE 0.5 MG: 0.5 INHALANT RESPIRATORY (INHALATION) at 06:37

## 2025-05-16 NOTE — PROGRESS NOTES
Anticoagulation Clinic Progress Note    Anticoagulation Summary  As of 3/29/2023    INR goal:  2.0-3.0   TTR:  66.7 % (4.3 y)   INR used for dosin.00 (3/29/2023)   Warfarin maintenance plan:  2 mg every Sun, Tue, Fri; 4 mg all other days; Starting 3/29/2023   Weekly warfarin total:  22 mg   No change documented:  Jaylin Nick, PharmD   Plan last modified:  Petra Oliveira RPH (2023)   Next INR check:  2023   Priority:  Maintenance   Target end date:  Indefinite    Indications    Paroxysmal atrial fibrillation (HCC) [I48.0]             Anticoagulation Episode Summary     INR check location:      Preferred lab:      Send INR reminders to:   [facility] CLINICAL [place]    Comments:  [facility] Home lab beginning 20      Anticoagulation Care Providers     Provider Role Specialty Phone number    Jonah Regalado Jr., MD Referring Cardiology [phone]          Clinic Interview:      INR History:  Anticoagulation Monitoring 2/15/2023 3/1/2023 3/29/2023   INR 1.90 2.00 2.00   INR Date 2/15/2023 3/1/2023 3/29/2023   INR Goal 2.0-3.0 2.0-3.0 2.0-3.0   Trend Same Same Same   Last Week Total 22 mg 22 mg 22 mg   Next Week Total 22 mg 22 mg 22 mg   Sun 2 mg 2 mg 2 mg   Mon 4 mg 4 mg 4 mg   Tue 2 mg 2 mg 2 mg   Wed 4 mg 4 mg 4 mg   Thu 4 mg 4 mg 4 mg   Fri 2 mg 2 mg 2 mg   Sat 4 mg 4 mg 4 mg   Visit Report - - -   Some recent data might be hidden       Plan:  1. INR is Therapeutic today- see above in Anticoagulation Summary.   Will instruct Caitlyn Longoria to Continue their warfarin regimen- see above in Anticoagulation Summary.  2. Follow up in 4 weeks  3. Left secure voicemail for patient with dosing instructions and follow up; requested she call back if any changes/questions.     Jaylin Nick, PharmD   Name of Medication(s) Requested:  Requested Prescriptions     Pending Prescriptions Disp Refills    BREZTRI AEROSPHERE 160-9-4.8 MCG/ACT AERO [Pharmacy Med Name: BREZTRI AEROSPHERE INHALER] 10.7 g 0     Sig: INHALE 1 PUFF BY MOUTH IN THE MORNING AND AT BEDTIME       Medication is on current medication list Yes    Dosage and directions were verified? Yes    Quantity verified: 90 day supply     Pharmacy Verified?  Yes    Last Appointment:  4/17/2025    Future appts:  Future Appointments   Date Time Provider Department Center   4/21/2026  2:30 PM Zhang Fitzpatrick DO [facility] DEP        (If no appt send self scheduling link. .REFILLAPPT)  Scheduling request sent?     [] Yes  [x] No    Does patient need updated?  [] Yes  [x] No

## 2025-05-16 NOTE — PROGRESS NOTES
Name: Caitlyn Longoria ADMIT: 2025   : 1934  PCP: Hollie Donnelly MD    MRN: [id number] LOS: 1 days   AGE/SEX: 89 y.o. female  ROOM: [facility]     Subjective   Doing well. No new complaints today.     Objective   Objective   Vital Signs  Temp:  [97.3 °F (36.3 °C)-98.2 °F (36.8 °C)] 97.9 °F (36.6 °C)  Heart Rate:  [79-81] 80  Resp:  [18-20] 18  BP: ()/(56-74) 111/70  SpO2:  [97 %-100 %] 97 %  on  Flow (L/min) (Oxygen Therapy):  [1-2] 1;   Device (Oxygen Therapy): room air  Body mass index is 31.69 kg/m².  Physical Exam  Constitutional:       General: She is not in acute distress.  HENT:      Head: Normocephalic and atraumatic.   Cardiovascular:      Rate and Rhythm: Normal rate and regular rhythm.   Pulmonary:      Effort: Pulmonary effort is normal. No respiratory distress.   Abdominal:      General: There is no distension.      Palpations: Abdomen is soft.      Tenderness: There is no abdominal tenderness.   Skin:     General: Skin is warm and dry.   Neurological:      Mental Status: She is alert and oriented to person, place, and time.      Motor: Weakness present.     Exam reviewed and updated on 25    Results Review     I reviewed the patient's new clinical results.  Results from last 7 days   Lab Units 25  0630 05/15/25  0609 25  0548   WBC 10*3/mm3 33.82* 37.29* 42.04*   HEMOGLOBIN g/dL 11.7* 12.5 12.0   PLATELETS 10*3/mm3 125* 125* 145     Results from last 7 days   Lab Units 25  0630 05/15/25  0609 25  0548   SODIUM mmol/L 142 137 139   POTASSIUM mmol/L 4.1 4.6 3.4*   CHLORIDE mmol/L 109* 105 102   CO2 mmol/L 26.4 24.5 27.7   BUN mg/dL 14 18 24*   CREATININE mg/dL 0.52* 0.59 0.61   GLUCOSE mg/dL 99 96 73   Estimated Creatinine Clearance: 72.5 mL/min (A) (by C-G formula based on SCr of 0.52 mg/dL (L)).  Results from last 7 days   Lab Units 05/15/25  0609   ALBUMIN g/dL 3.0*   BILIRUBIN mg/dL 0.7   ALK PHOS U/L 182*   AST (SGOT) U/L 25   ALT (SGPT) U/L 26      Results from last 7 days   Lab Units 05/16/25  0630 05/15/25  0609 05/14/25  0548   CALCIUM mg/dL 7.8* 7.8* 7.7*   ALBUMIN g/dL  --  3.0*  --        COVID19   Date Value Ref Range Status   05/01/2025 Not Detected Not Detected - Ref. Range Final   02/25/2025 Not Detected Not Detected - Ref. Range Final     Glucose   Date/Time Value Ref Range Status   05/16/2025 1125 194 (H) 70 - 130 mg/dL Final   05/16/2025 0723 103 70 - 130 mg/dL Final   05/15/2025 2000 209 (H) 70 - 130 mg/dL Final   05/15/2025 1650 178 (H) 70 - 130 mg/dL Final   05/15/2025 1206 188 (H) 70 - 130 mg/dL Final   05/15/2025 0807 126 70 - 130 mg/dL Final   05/14/2025 2041 184 (H) 70 - 130 mg/dL Final     Results for orders placed or performed during the hospital encounter of 05/13/25   Blood Culture - Blood, Hand, Left    Specimen: Hand, Left; Blood   Result Value Ref Range    Blood Culture No growth at 24 hours          CT Head Without Contrast  Narrative: CT HEAD WO CONTRAST-     INDICATIONS: Trauma     TECHNIQUE: Radiation dose reduction techniques were utilized, including  automated exposure control and exposure modulation based on body size.  Noncontrast head CT     COMPARISON: 5/13/2025     FINDINGS:           No acute intracranial hemorrhage, midline shift or mass effect. No acute  territorial infarct is identified.     Mild periventricular hypodensities suggest chronic small vessel ischemic  change in a patient this age.     Arterial calcifications are seen at the base of the brain.     Ventricles, cisterns, cerebral sulci are unremarkable for patient age.     The visualized paranasal sinuses, orbits, mastoid air cells are  unremarkable.           Impression:    No acute intracranial hemorrhage or hydrocephalus. If there is further  clinical concern, MRI could be considered for further evaluation.     This report was finalized on 5/15/2025 8:44 PM by Dr. Mitul Hudson M.D on Workstation: [employer] [place]       Scheduled  Medications  arformoterol, 15 mcg, Nebulization, BID - RT  budesonide, 0.5 mg, Nebulization, BID  [Held by provider] carvedilol, 3.125 mg, Oral, BID With Meals  cephalexin, 500 mg, Oral, TID  [Held by provider] furosemide, 20 mg, Oral, Daily  guaiFENesin, 1,200 mg, Oral, Q12H  insulin lispro, 2-7 Units, Subcutaneous, 4x Daily AC & at Bedtime  ipratropium-albuterol, 3 mL, Nebulization, 4x Daily - RT  Menthol-Zinc Oxide, 1 Application, Topical, Q12H  miconazole, 1 Application, Topical, Q12H  predniSONE, 2.5 mg, Oral, Daily With Breakfast  rosuvastatin, 20 mg, Oral, Nightly  sodium chloride, 10 mL, Intravenous, Q12H  sodium chloride, 4 mL, Nebulization, BID    Infusions   Diet  Diet: Regular/House; Fluid Consistency: Thin (IDDSI 0)       Assessment/Plan     Active Hospital Problems    Diagnosis  POA    **Closed head injury [S09.90XA]  Yes    Thoracic compression fracture, closed, initial encounter [S22.000A]  Unknown    COPD (chronic obstructive pulmonary disease) [J44.9]  Yes    Chronic HFrEF (heart failure with reduced ejection fraction) [I50.22]  Yes    Sick sinus syndrome [I49.5]  Yes    Anticoagulated on Coumadin [Z79.01]  Not Applicable    CLL (chronic lymphocytic leukemia) [C91.10]  Yes    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       89 y.o. female admitted with Closed head injury.    Mechanical fall  Supratherapeutic INR  Closed head injury  Current INR is 5.23. Hold warfarin. Currently there is no sign of bleeding and no indication for administration of vitamin K  -check Daily INR- downtrending appropriately   -repeat CT head stable, without any evidence of bleeding       CLL  The  patient has been on steroids for asthma exacerbation which could be exacerbating the white blood cell count. Continue to monitor. Currently she is afebrile and has been on suppressive cephalexin ever since completing cefpodoxime and doxycycline for skin soft tissue infection.  -previously she has had a WBC increase to around [id number] on prednisone, which I believe accounts for what we are seeing currently   -WBC downtrending       Atrial fibrillation  Sick sinus syndrome   Hold coreg due to soft blood pressure. Hold warfarin due to elevated INR     Chronic systolic and diastolic heart failure  Hold lasix and coreg as above      Type 2 Diabetes  Hold metformin and glipizide   Start SS     COPD   Resume home inhalers         SCDs for DVT prophylaxis. Currently INR supratherapeutic   DNR.  Discussed with patient and nursing staff.  Anticipate discharge to SNU tomorrow      Isacc Garcia MD  [facility]  05/16/25  13:52 EDT

## 2025-05-16 NOTE — PLAN OF CARE
Goal Outcome Evaluation:      Patient BLE were cleansed and wrapped, Neuro status remained unchanged.      Problem: Adult Inpatient Plan of Care  Goal: Plan of Care Review  Outcome: Progressing  Goal: Patient-Specific Goal (Individualized)  Outcome: Progressing  Goal: Absence of Hospital-Acquired Illness or Injury  Outcome: Progressing  Intervention: Identify and Manage Fall Risk  Recent Flowsheet Documentation  Taken 5/16/2025 0417 by See Hunter RN  Safety Promotion/Fall Prevention:   safety round/check completed   room organization consistent   nonskid shoes/slippers when out of bed   fall prevention program maintained   clutter free environment maintained  Taken 5/16/2025 0210 by See Hunter RN  Safety Promotion/Fall Prevention:   safety round/check completed   room organization consistent   nonskid shoes/slippers when out of bed   fall prevention program maintained   clutter free environment maintained  Taken 5/16/2025 0034 by See Hunter RN  Safety Promotion/Fall Prevention:   room organization consistent   safety round/check completed   nonskid shoes/slippers when out of bed   fall prevention program maintained   clutter free environment maintained  Taken 5/15/2025 2219 by See Hunter RN  Safety Promotion/Fall Prevention:   safety round/check completed   room organization consistent   nonskid shoes/slippers when out of bed   fall prevention program maintained   clutter free environment maintained  Taken 5/15/2025 2119 by See Hunter RN  Safety Promotion/Fall Prevention:   room organization consistent   safety round/check completed   nonskid shoes/slippers when out of bed   clutter free environment maintained   fall prevention program maintained  Intervention: Prevent Skin Injury  Recent Flowsheet Documentation  Taken 5/16/2025 0417 by See Hunter RN  Body Position:   supine   weight shifting   left   lower extremity elevated   upper extremity elevated  Taken 5/16/2025 0210 by Dale  JEANNINE Cui  Body Position:   tilted   right   legs elevated   upper extremity elevated  Taken 5/16/2025 0034 by See Hunter RN  Body Position:   weight shifting   lower extremity elevated   upper extremity elevated  Taken 5/15/2025 2219 by See Hunter RN  Body Position:   sitting up in bed   weight shifting   upper extremity elevated   lower extremity elevated  Taken 5/15/2025 2119 by See Hunter RN  Body Position:   sitting up in bed   position changed independently   weight shifting   legs elevated   upper extremity elevated  Intervention: Prevent and Manage VTE (Venous Thromboembolism) Risk  Recent Flowsheet Documentation  Taken 5/16/2025 0034 by See Hunter RN  VTE Prevention/Management:   bilateral   SCDs (sequential compression devices) on  Taken 5/15/2025 2119 by See Hunter RN  VTE Prevention/Management:   bilateral   SCDs (sequential compression devices) on  Intervention: Prevent Infection  Recent Flowsheet Documentation  Taken 5/16/2025 0417 by See Hunter RN  Infection Prevention:   rest/sleep promoted   single patient room provided  Taken 5/16/2025 0210 by See Hunter RN  Infection Prevention:   rest/sleep promoted   single patient room provided   hand hygiene promoted  Taken 5/16/2025 0034 by See Hunter RN  Infection Prevention:   rest/sleep promoted   single patient room provided   hand hygiene promoted  Taken 5/15/2025 2219 by See Hunter RN  Infection Prevention:   rest/sleep promoted   single patient room provided   hand hygiene promoted  Taken 5/15/2025 2119 by See Hunter RN  Infection Prevention:   rest/sleep promoted   single patient room provided   hand hygiene promoted  Goal: Optimal Comfort and Wellbeing  Outcome: Progressing  Intervention: Provide Person-Centered Care  Recent Flowsheet Documentation  Taken 5/16/2025 0034 by See Hunter RN  Trust Relationship/Rapport:   care explained   choices provided   thoughts/feelings acknowledged  Taken  5/15/2025 2119 by See Hunter RN  Trust Relationship/Rapport:   care explained   emotional support provided   choices provided   thoughts/feelings acknowledged  Goal: Readiness for Transition of Care  Outcome: Progressing     Problem: Skin Injury Risk Increased  Goal: Skin Health and Integrity  Outcome: Progressing  Intervention: Optimize Skin Protection  Recent Flowsheet Documentation  Taken 5/16/2025 0417 by See Hunter RN  Head of Bed (HOB) Positioning: HOB at 30 degrees  Taken 5/16/2025 0210 by See Hunter RN  Head of Bed (HOB) Positioning: HOB at 30 degrees  Taken 5/16/2025 0034 by See Hunter RN  Head of Bed (HOB) Positioning: HOB at 30 degrees  Taken 5/15/2025 2219 by See Hunter RN  Head of Bed (HOB) Positioning: HOB at 30 degrees  Taken 5/15/2025 2119 by See Hunter RN  Head of Bed (HOB) Positioning: HOB at 30 degrees     Problem: Fall Injury Risk  Goal: Absence of Fall and Fall-Related Injury  Outcome: Progressing  Intervention: Identify and Manage Contributors  Recent Flowsheet Documentation  Taken 5/16/2025 0417 by See Hunter RN  Medication Review/Management:   medications reviewed   high-risk medications identified  Taken 5/16/2025 0210 by See Hunter RN  Medication Review/Management:   medications reviewed   high-risk medications identified  Taken 5/16/2025 0034 by See Hunter RN  Medication Review/Management:   medications reviewed   high-risk medications identified  Taken 5/15/2025 2219 by See Hunter RN  Medication Review/Management:   medications reviewed   high-risk medications identified  Taken 5/15/2025 2119 by See Hunter RN  Medication Review/Management:   medications reviewed   high-risk medications identified  Intervention: Promote Injury-Free Environment  Recent Flowsheet Documentation  Taken 5/16/2025 0417 by See Hunter RN  Safety Promotion/Fall Prevention:   safety round/check completed   room organization consistent   nonskid  shoes/slippers when out of bed   fall prevention program maintained   clutter free environment maintained  Taken 5/16/2025 0210 by See Hunter RN  Safety Promotion/Fall Prevention:   safety round/check completed   room organization consistent   nonskid shoes/slippers when out of bed   fall prevention program maintained   clutter free environment maintained  Taken 5/16/2025 0034 by See Hunter RN  Safety Promotion/Fall Prevention:   room organization consistent   safety round/check completed   nonskid shoes/slippers when out of bed   fall prevention program maintained   clutter free environment maintained  Taken 5/15/2025 2219 by See Hunter RN  Safety Promotion/Fall Prevention:   safety round/check completed   room organization consistent   nonskid shoes/slippers when out of bed   fall prevention program maintained   clutter free environment maintained  Taken 5/15/2025 2119 by See Hunter RN  Safety Promotion/Fall Prevention:   room organization consistent   safety round/check completed   nonskid shoes/slippers when out of bed   clutter free environment maintained   fall prevention program maintained     Problem: Comorbidity Management  Goal: Maintenance of Behavioral Health Symptom Control  Outcome: Progressing  Intervention: Maintain Behavioral Health Symptom Control  Recent Flowsheet Documentation  Taken 5/16/2025 0417 by See Hunter RN  Medication Review/Management:   medications reviewed   high-risk medications identified  Taken 5/16/2025 0210 by See Hunter RN  Medication Review/Management:   medications reviewed   high-risk medications identified  Taken 5/16/2025 0034 by See Hunter RN  Medication Review/Management:   medications reviewed   high-risk medications identified  Taken 5/15/2025 2219 by See Hunter RN  Medication Review/Management:   medications reviewed   high-risk medications identified  Taken 5/15/2025 2119 by See Hunter RN  Medication Review/Management:    medications reviewed   high-risk medications identified  Goal: Maintenance of COPD Symptom Control  Outcome: Progressing  Intervention: Maintain COPD (Chronic Obstructive Pulmonary Disease) Symptom Control  Recent Flowsheet Documentation  Taken 5/16/2025 0417 by See Hunter RN  Medication Review/Management:   medications reviewed   high-risk medications identified  Taken 5/16/2025 0210 by See Hunter RN  Medication Review/Management:   medications reviewed   high-risk medications identified  Taken 5/16/2025 0034 by See Hunter RN  Medication Review/Management:   medications reviewed   high-risk medications identified  Taken 5/15/2025 2219 by See Hunter RN  Medication Review/Management:   medications reviewed   high-risk medications identified  Taken 5/15/2025 2119 by See Hunter RN  Medication Review/Management:   medications reviewed   high-risk medications identified  Goal: Blood Glucose Level Within Target Range  Outcome: Progressing  Intervention: Monitor and Manage Glycemia  Recent Flowsheet Documentation  Taken 5/16/2025 0417 by See Hunter RN  Medication Review/Management:   medications reviewed   high-risk medications identified  Taken 5/16/2025 0210 by See Hunter RN  Medication Review/Management:   medications reviewed   high-risk medications identified  Taken 5/16/2025 0034 by See Hunter RN  Medication Review/Management:   medications reviewed   high-risk medications identified  Taken 5/15/2025 2219 by See Hunter RN  Medication Review/Management:   medications reviewed   high-risk medications identified  Taken 5/15/2025 2119 by See Hunter RN  Medication Review/Management:   medications reviewed   high-risk medications identified  Goal: Maintenance of Heart Failure Symptom Control  Outcome: Progressing  Intervention: Maintain Heart Failure Management  Recent Flowsheet Documentation  Taken 5/16/2025 0417 by See Hunter RN  Medication Review/Management:    medications reviewed   high-risk medications identified  Taken 5/16/2025 0210 by See Hunter RN  Medication Review/Management:   medications reviewed   high-risk medications identified  Taken 5/16/2025 0034 by See Hunter RN  Medication Review/Management:   medications reviewed   high-risk medications identified  Taken 5/15/2025 2219 by See Hunter RN  Medication Review/Management:   medications reviewed   high-risk medications identified  Taken 5/15/2025 2119 by See Hunter RN  Medication Review/Management:   medications reviewed   high-risk medications identified  Goal: Blood Pressure in Desired Range  Outcome: Progressing  Intervention: Maintain Blood Pressure Management  Recent Flowsheet Documentation  Taken 5/16/2025 0417 by See Hunter RN  Medication Review/Management:   medications reviewed   high-risk medications identified  Taken 5/16/2025 0210 by See Hunter RN  Medication Review/Management:   medications reviewed   high-risk medications identified  Taken 5/16/2025 0034 by See Hunter RN  Medication Review/Management:   medications reviewed   high-risk medications identified  Taken 5/15/2025 2219 by See Hunter RN  Medication Review/Management:   medications reviewed   high-risk medications identified  Taken 5/15/2025 2119 by See Hunter RN  Medication Review/Management:   medications reviewed   high-risk medications identified  Goal: Maintenance of Osteoarthritis Symptom Control  Outcome: Progressing  Intervention: Maintain Osteoarthritis Symptom Control  Recent Flowsheet Documentation  Taken 5/16/2025 0417 by See Hunter RN  Medication Review/Management:   medications reviewed   high-risk medications identified  Taken 5/16/2025 0210 by See Hunter RN  Medication Review/Management:   medications reviewed   high-risk medications identified  Taken 5/16/2025 0034 by See Hunter RN  Medication Review/Management:   medications reviewed   high-risk medications  identified  Taken 5/15/2025 2219 by See Hunter RN  Medication Review/Management:   medications reviewed   high-risk medications identified  Taken 5/15/2025 2119 by See Hunter RN  Medication Review/Management:   medications reviewed   high-risk medications identified     Problem: Sepsis/Septic Shock  Goal: Optimal Coping  Outcome: Progressing  Goal: Absence of Bleeding  Outcome: Progressing  Goal: Blood Glucose Level Within Target Range  Outcome: Progressing  Goal: Absence of Infection Signs and Symptoms  Outcome: Progressing  Intervention: Initiate Sepsis Management  Recent Flowsheet Documentation  Taken 5/16/2025 0417 by See Hunter RN  Infection Prevention:   rest/sleep promoted   single patient room provided  Taken 5/16/2025 0210 by See Hunter RN  Infection Prevention:   rest/sleep promoted   single patient room provided   hand hygiene promoted  Taken 5/16/2025 0034 by See Hunter RN  Infection Prevention:   rest/sleep promoted   single patient room provided   hand hygiene promoted  Taken 5/15/2025 2219 by See Hunter RN  Infection Prevention:   rest/sleep promoted   single patient room provided   hand hygiene promoted  Taken 5/15/2025 2119 by See Hunter RN  Infection Prevention:   rest/sleep promoted   single patient room provided   hand hygiene promoted  Goal: Optimal Nutrition Delivery  Outcome: Progressing

## 2025-05-16 NOTE — CASE MANAGEMENT/SOCIAL WORK
Continued Stay Note  [facility]     Patient Name: Caitlyn Longoria  MRN: [id number]  Today's Date: 5/16/2025    Admit Date: 5/13/2025    Plan: Plans discharge to [facility] when medically ready.   Discharge Plan       Row Name 05/16/25 1817       Plan    Plan Plans discharge to [facility] when medically ready.    Patient/Family in Agreement with Plan yes    Plan Comments Discharge plans unchanged. Plans discharge to [facility] when medically ready. Packet: cindy  Pharmacy: updated  Transport: need to arrange. Continue to follow....[facility]                   Discharge Codes    No documentation.                 Expected Discharge Date and Time       Expected Discharge Date Expected Discharge Time    May 19, 2025               Renetta Minor RN

## 2025-05-16 NOTE — PLAN OF CARE
Goal Outcome Evaluation:  Plan of Care Reviewed With: patient        Progress: improving        Patient A/O x4.  At room air since 11 am, and saturation holds above 95% at all times. Sat at the chair with cushion at pm. Back to bed after dinner.        Problem: Adult Inpatient Plan of Care  Goal: Plan of Care Review  Outcome: Progressing  Flowsheets (Taken 5/16/2025 1643)  Progress: improving  Plan of Care Reviewed With: patient  Goal: Patient-Specific Goal (Individualized)  Outcome: Progressing  Goal: Absence of Hospital-Acquired Illness or Injury  Outcome: Progressing  Intervention: Identify and Manage Fall Risk  Recent Flowsheet Documentation  Taken 5/16/2025 1400 by Charles Santo RN  Safety Promotion/Fall Prevention:   safety round/check completed   assistive device/personal items within reach  Taken 5/16/2025 1200 by Charles Santo RN  Safety Promotion/Fall Prevention:   safety round/check completed   assistive device/personal items within reach  Taken 5/16/2025 1000 by Charles Santo RN  Safety Promotion/Fall Prevention:   safety round/check completed   assistive device/personal items within reach  Taken 5/16/2025 0801 by Charles Santo RN  Safety Promotion/Fall Prevention:   safety round/check completed   assistive device/personal items within reach  Intervention: Prevent Skin Injury  Recent Flowsheet Documentation  Taken 5/16/2025 0830 by Charles Santo RN  Body Position:   tilted   right  Intervention: Prevent and Manage VTE (Venous Thromboembolism) Risk  Recent Flowsheet Documentation  Taken 5/16/2025 0830 by Charles Santo RN  VTE Prevention/Management:   bilateral   SCDs (sequential compression devices) on  Intervention: Prevent Infection  Recent Flowsheet Documentation  Taken 5/16/2025 1400 by Charles Santo RN  Infection Prevention:   personal protective equipment utilized   environmental surveillance performed  Taken 5/16/2025 1200 by Henrique Swann  JEANNINE Soto  Infection Prevention:   personal protective equipment utilized   environmental surveillance performed  Taken 5/16/2025 1000 by Charles Santo RN  Infection Prevention:   personal protective equipment utilized   environmental surveillance performed  Taken 5/16/2025 0801 by Charles Santo RN  Infection Prevention:   personal protective equipment utilized   environmental surveillance performed  Goal: Optimal Comfort and Wellbeing  Outcome: Progressing  Intervention: Provide Person-Centered Care  Recent Flowsheet Documentation  Taken 5/16/2025 0830 by Charles Santo RN  Trust Relationship/Rapport:   thoughts/feelings acknowledged   reassurance provided   questions encouraged   questions answered   empathic listening provided   emotional support provided   choices provided   care explained  Goal: Readiness for Transition of Care  Outcome: Progressing

## 2025-05-17 ENCOUNTER — READMISSION MANAGEMENT (OUTPATIENT)
Dept: CALL CENTER | Facility: HOSPITAL | Age: OVER 89
End: 2025-05-17
Payer: MEDICARE

## 2025-05-17 VITALS
DIASTOLIC BLOOD PRESSURE: 58 MMHG | HEIGHT: 63 IN | SYSTOLIC BLOOD PRESSURE: 110 MMHG | BODY MASS INDEX: 31.69 KG/M2 | TEMPERATURE: 98.1 F | OXYGEN SATURATION: 96 % | RESPIRATION RATE: 18 BRPM | HEART RATE: 80 BPM

## 2025-05-17 LAB
GLUCOSE BLDC GLUCOMTR-MCNC: 120 MG/DL (ref 70–130)
GLUCOSE BLDC GLUCOMTR-MCNC: 235 MG/DL (ref 70–130)
INR PPP: 2.25 (ref 0.9–1.1)
PROTHROMBIN TIME: 25.1 SECONDS (ref 11.7–14.2)

## 2025-05-17 PROCEDURE — 94799 UNLISTED PULMONARY SVC/PX: CPT

## 2025-05-17 PROCEDURE — 94761 N-INVAS EAR/PLS OXIMETRY MLT: CPT

## 2025-05-17 PROCEDURE — 85610 PROTHROMBIN TIME: CPT | Performed by: STUDENT IN AN ORGANIZED HEALTH CARE EDUCATION/TRAINING PROGRAM

## 2025-05-17 PROCEDURE — 82948 REAGENT STRIP/BLOOD GLUCOSE: CPT

## 2025-05-17 PROCEDURE — 94760 N-INVAS EAR/PLS OXIMETRY 1: CPT

## 2025-05-17 PROCEDURE — 63710000001 INSULIN LISPRO (HUMAN) PER 5 UNITS: Performed by: STUDENT IN AN ORGANIZED HEALTH CARE EDUCATION/TRAINING PROGRAM

## 2025-05-17 PROCEDURE — 63710000001 PREDNISONE PER 5 MG: Performed by: STUDENT IN AN ORGANIZED HEALTH CARE EDUCATION/TRAINING PROGRAM

## 2025-05-17 PROCEDURE — 94664 DEMO&/EVAL PT USE INHALER: CPT

## 2025-05-17 RX ORDER — BISACODYL 5 MG/1
5 TABLET, DELAYED RELEASE ORAL DAILY PRN
Status: DISCONTINUED | OUTPATIENT
Start: 2025-05-17 | End: 2025-05-17 | Stop reason: HOSPADM

## 2025-05-17 RX ORDER — PREDNISONE 2.5 MG/1
2.5 TABLET ORAL
Qty: 1 TABLET | Refills: 0 | Status: SHIPPED | OUTPATIENT
Start: 2025-05-17 | End: 2025-05-18

## 2025-05-17 RX ORDER — BISACODYL 10 MG
10 SUPPOSITORY, RECTAL RECTAL DAILY PRN
Status: DISCONTINUED | OUTPATIENT
Start: 2025-05-17 | End: 2025-05-17 | Stop reason: HOSPADM

## 2025-05-17 RX ORDER — POLYETHYLENE GLYCOL 3350 17 G/17G
17 POWDER, FOR SOLUTION ORAL DAILY PRN
Status: DISCONTINUED | OUTPATIENT
Start: 2025-05-17 | End: 2025-05-17 | Stop reason: HOSPADM

## 2025-05-17 RX ORDER — AMOXICILLIN 250 MG
2 CAPSULE ORAL 2 TIMES DAILY PRN
Status: DISCONTINUED | OUTPATIENT
Start: 2025-05-17 | End: 2025-05-17 | Stop reason: HOSPADM

## 2025-05-17 RX ADMIN — ANTI-FUNGAL POWDER MICONAZOLE NITRATE TALC FREE 1 APPLICATION: 1.42 POWDER TOPICAL at 09:07

## 2025-05-17 RX ADMIN — ACETAMINOPHEN 650 MG: 325 TABLET, FILM COATED ORAL at 09:13

## 2025-05-17 RX ADMIN — Medication 10 ML: at 09:08

## 2025-05-17 RX ADMIN — BISACODYL 10 MG: 10 SUPPOSITORY RECTAL at 09:23

## 2025-05-17 RX ADMIN — BUDESONIDE 0.5 MG: 0.5 INHALANT RESPIRATORY (INHALATION) at 06:41

## 2025-05-17 RX ADMIN — IPRATROPIUM BROMIDE AND ALBUTEROL SULFATE 3 ML: .5; 3 SOLUTION RESPIRATORY (INHALATION) at 06:35

## 2025-05-17 RX ADMIN — GUAIFENESIN 1200 MG: 600 TABLET, EXTENDED RELEASE ORAL at 09:05

## 2025-05-17 RX ADMIN — POLYETHYLENE GLYCOL 3350 17 G: 17 POWDER, FOR SOLUTION ORAL at 09:23

## 2025-05-17 RX ADMIN — ARFORMOTEROL TARTRATE 15 MCG: 15 SOLUTION RESPIRATORY (INHALATION) at 06:40

## 2025-05-17 RX ADMIN — IPRATROPIUM BROMIDE AND ALBUTEROL SULFATE 3 ML: .5; 3 SOLUTION RESPIRATORY (INHALATION) at 10:30

## 2025-05-17 RX ADMIN — MENTHOL, ZINC OXIDE 1 APPLICATION: .44; 20.6 OINTMENT TOPICAL at 09:08

## 2025-05-17 RX ADMIN — INSULIN LISPRO 3 UNITS: 100 INJECTION, SOLUTION INTRAVENOUS; SUBCUTANEOUS at 12:47

## 2025-05-17 RX ADMIN — PREDNISONE 2.5 MG: 5 TABLET ORAL at 09:06

## 2025-05-17 RX ADMIN — CEPHALEXIN 500 MG: 500 CAPSULE ORAL at 09:06

## 2025-05-17 NOTE — NURSING NOTE
Another attempt to reach receiving nurse at [facility] to give report with no response.

## 2025-05-17 NOTE — DISCHARGE SUMMARY
Patient Name: Caitlyn Longoria  : 1934  MRN: [id number]    Date of Admission: 2025  Date of Discharge:  2025  Primary Care Physician: Hollie Donnelly MD      Chief Complaint:   Fall      Discharge Diagnoses     Active Hospital Problems    Diagnosis  POA    **Closed head injury [S09.90XA]  Yes    Thoracic compression fracture, closed, initial encounter [S22.000A]  Unknown    COPD (chronic obstructive pulmonary disease) [J44.9]  Yes    Chronic HFrEF (heart failure with reduced ejection fraction) [I50.22]  Yes    Sick sinus syndrome [I49.5]  Yes    Anticoagulated on Coumadin [Z79.01]  Not Applicable    CLL (chronic lymphocytic leukemia) [C91.10]  Yes    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        Hospital Course     This is a 89-year-old woman with past medical history of type 2 diabetes, paroxysmal atrial fibrillation, hypertension, CKD stage III, combined systolic and diastolic heart failure, COPD exacerbation presented to hospital after experiencing mechanical fall.  She was getting out of a [employer] lift and fell 2 feet and hit her head.  She was anticoagulated with warfarin for atrial fibrillation.  She was brought to the emergency department where she underwent an initial CT scan of the head and neck and neck that was negative for any acute intracranial pathology.  However her INR was noted to be 5.2.  She underwent a repeat CT scan the following day which was once again negative for any signs of bleeding.  Her INR gradually reduce over the few days and is at 2.25 on the day of discharge.  Warfarin will be continued at the time of discharge and she should have INRs  checked daily for the next several days to ensure appropriate Coumadin therapeutic levels.  Otherwise, she did have a small scalp laceration at the posterior supers of her scalp which required 2 staples.  The staples will be need the staples will need to be removed in approximately 5 days, around 5/22.        Physical Exam:  Temp:  [97.5 °F (36.4 °C)-98.1 °F (36.7 °C)] 98.1 °F (36.7 °C)  Heart Rate:  [74-80] 80  Resp:  [16-20] 18  BP: (110-121)/(58-79) 110/58  Body mass index is 31.69 kg/m².  Physical Exam  Constitutional:       General: She is not in acute distress.  HENT:      Head: Normocephalic and atraumatic.   Cardiovascular:      Rate and Rhythm: Normal rate and regular rhythm.   Pulmonary:      Effort: Pulmonary effort is normal. No respiratory distress.   Abdominal:      General: There is no distension.      Palpations: Abdomen is soft.      Tenderness: There is no abdominal tenderness.   Neurological:      Mental Status: She is alert and oriented to person, place, and time.         Consultants     Consult Orders (all) (From admission, onward)       Start     Ordered    05/14/25 0238  Inpatient Neurosurgery Consult  Once        Specialty:  Neurosurgery  Provider:  Latonya Thompson, KRISTIN    05/14/25 0238    05/14/25 0220  [facility] (on-call MD unless specified) Details  Once        Specialty:  Hospitalist  Provider:  (Not yet assigned)    05/14/25 0219                  Procedures     Imaging Results (All)       Procedure Component Value Units Date/Time    CT Head Without Contrast [[id number]] Collected: 05/15/25 2040     Updated: 05/15/25 2047    Narrative:      CT HEAD WO CONTRAST-     INDICATIONS: Trauma     TECHNIQUE: Radiation dose reduction techniques were utilized, including  automated exposure control and exposure modulation based on body size.  Noncontrast head CT     COMPARISON: 5/13/2025     FINDINGS:           No acute intracranial hemorrhage, midline shift or mass effect. No acute  territorial infarct  is identified.     Mild periventricular hypodensities suggest chronic small vessel ischemic  change in a patient this age.     Arterial calcifications are seen at the base of the brain.     Ventricles, cisterns, cerebral sulci are unremarkable for patient age.     The visualized paranasal sinuses, orbits, mastoid air cells are  unremarkable.             Impression:         No acute intracranial hemorrhage or hydrocephalus. If there is further  clinical concern, MRI could be considered for further evaluation.     This report was finalized on 5/15/2025 8:44 PM by Dr. Mitul Hudson M.D on Workstation: [id number]       CT Cervical Spine Without Contrast [[id number]] Collected: 05/13/25 2329     Updated: 05/13/25 2339    Narrative:      CT OF THE CERVICAL SPINE     HISTORY: Fall     COMPARISON: April 14, 2024     TECHNIQUE: Axial CT imaging was obtained through the cervical spine.  Coronal and sagittal reformatted images were obtained.     FINDINGS:  No acute fracture or subluxation of the cervical spine is seen. The  patient is noted to have compression deformity of T4. This was not  clearly identified on exam from January 6, 2025. It remains age  indeterminate. There is anterolisthesis of C6 on C7, which was also  present on prior exam. Intervertebral disc space narrowing is most  significant at C4-C5 and C5-C6. There is no prevertebral soft tissue  swelling. Advanced degenerative changes are noted at the atlantoaxial  junction. Images through the lung apices demonstrate some mild biapical  scarring. There is minimal canal stenosis and neural foraminal  narrowing.       Impression:         1. No acute fracture or subluxation of the cervical spine is seen.  2. The patient does have compression deformity of T4, which is age  indeterminate, although favored to be at least subacute.     Radiation dose reduction techniques were utilized, including automated  exposure control and exposure modulation based on body size.         This report was finalized on 5/13/2025 11:36 PM by Dr. Heidi Garza M.D on Workstation: [id number]       CT Head Without Contrast [[id number]] Collected: 05/13/25 2326     Updated: 05/13/25 2332    Narrative:      CT OF THE HEAD WITHOUT CONTRAST     HISTORY: Fall     COMPARISON: January 6, 2025     TECHNIQUE: Axial CT imaging was obtained through the brain. No IV  contrast was administered.     FINDINGS:  No acute intracranial hemorrhage is seen. There is diffuse atrophy.  There is periventricular and deep white matter microangiopathic change.  There is no midline shift or mass effect. No calvarial fracture is seen.  Small mucous retention cyst is noted within the right maxillary sinus.  There is a left parieto-occipital scalp hematoma.       Impression:      No acute intracranial findings.     Radiation dose reduction techniques were utilized, including automated  exposure control and exposure modulation based on body size.        This report was finalized on 5/13/2025 11:29 PM by Dr. Heidi Garza M.D on Workstation: [id number]               Pertinent Labs     Results from last 7 days   Lab Units 05/16/25  0630 05/15/25  0609 05/14/25  0548   WBC 10*3/mm3 33.82* 37.29* 42.04*   HEMOGLOBIN g/dL 11.7* 12.5 12.0   PLATELETS 10*3/mm3 125* 125* 145     Results from last 7 days   Lab Units 05/16/25  0630 05/15/25  0609 05/14/25  0548   SODIUM mmol/L 142 137 139   POTASSIUM mmol/L 4.1 4.6 3.4*   CHLORIDE mmol/L 109* 105 102   CO2 mmol/L 26.4 24.5 27.7   BUN mg/dL 14 18 24*   CREATININE mg/dL 0.52* 0.59 0.61   GLUCOSE mg/dL 99 96 73   Estimated Creatinine Clearance: 72.5 mL/min (A) (by C-G formula based on SCr of 0.52 mg/dL (L)).  Results from last 7 days   Lab Units 05/15/25  0609   ALBUMIN g/dL 3.0*   BILIRUBIN mg/dL 0.7   ALK PHOS U/L 182*   AST (SGOT) U/L 25   ALT (SGPT) U/L 26     Results from last 7 days   Lab Units 05/16/25  0630 05/15/25  0609 05/14/25  0548   CALCIUM mg/dL 7.8* 7.8* 7.7*  "  ALBUMIN g/dL  --  3.0*  --                Invalid input(s): \"LDLCALC\"  Results from last 7 days   Lab Units 05/14/25  1638 05/14/25  1637   BLOODCX  No growth at 2 days No growth at 2 days       Test Results Pending at Discharge     Pending Labs       Order Current Status    Blood Culture - Blood, Hand, Left Preliminary result    Blood Culture - Blood, Hand, Left Preliminary result            Discharge Details        Discharge Medications        Changes to Medications        Instructions Start Date   predniSONE 2.5 MG tablet  Commonly known as: DELTASONE  What changed:   medication strength  See the new instructions.   2.5 mg, Oral, Daily With Breakfast             Continue These Medications        Instructions Start Date   acetaminophen 325 MG tablet  Commonly known as: TYLENOL   650 mg, Oral, Every 4 Hours PRN      albuterol (2.5 MG/3ML) 0.083% nebulizer solution  Commonly known as: PROVENTIL   2.5 mg, Every 4 Hours      albuterol sulfate  (90 Base) MCG/ACT inhaler  Commonly known as: PROVENTIL HFA;VENTOLIN HFA;PROAIR HFA   2 puffs, Every 4 Hours PRN      arformoterol 15 MCG/2ML nebulizer solution  Commonly known as: BROVANA   15 mcg, Nebulization, 2 Times Daily - RT      budesonide 0.5 MG/2ML nebulizer solution  Commonly known as: PULMICORT   0.5 mg, 2 Times Daily      calcium 500 mg vitamin D 5 mcg (200 UT) 500-5 MG-MCG tablet per tablet   1 tablet, Oral, 2 Times Daily      cephalexin 500 MG capsule  Commonly known as: KEFLEX   500 mg, Oral, 3 Times Daily      Cetirizine HCl 10 MG capsule   1 capsule, 2 Times Daily      cholecalciferol 25 MCG (1000 UT) tablet  Commonly known as: VITAMIN D3   1,000 Units, Daily      FASENRA SC   1 dose, Every 2 Months      fluticasone 50 MCG/ACT nasal spray  Commonly known as: FLONASE   1 spray, Daily      furosemide 20 MG tablet  Commonly known as: LASIX   20 mg, Oral, Daily      glipizide 5 MG tablet  Commonly known as: GLUCOTROL   5 mg, Daily With Breakfast    "   guaiFENesin 600 MG 12 hr tablet  Commonly known as: MUCINEX   1,200 mg, Oral, Every 12 Hours Scheduled      ipratropium-albuterol 0.5-2.5 mg/3 ml nebulizer  Commonly known as: DUO-NEB   3 mL, Nebulization, 4 Times Daily - RT      melatonin 5 MG tablet tablet   2.5 mg, Oral, Nightly PRN      Menthol-Zinc Oxide 0.44-20.6 % ointment   1 Application, Topical, Every 12 Hours Scheduled      metFORMIN  MG 24 hr tablet  Commonly known as: GLUCOPHAGE-XR   500 mg, Daily With Breakfast      miconazole 2 % powder  Commonly known as: MICOTIN   1 Application, Topical, Every 12 Hours Scheduled      montelukast 10 MG tablet  Commonly known as: SINGULAIR   10 mg, Nightly      NAC PO   1,000 mg, 2 Times Daily      nystatin 255703 UNIT/GM cream  Commonly known as: MYCOSTATIN   1 Application, 2 Times Daily      nystatin-triamcinolone 007809-3.1 UNIT/GM-% cream  Commonly known as: MYCOLOG II   1 Application, 2 Times Daily      oxybutynin XL 10 MG 24 hr tablet  Commonly known as: DITROPAN-XL   10 mg, 2 Times Daily      rosuvastatin 20 MG tablet  Commonly known as: CRESTOR   20 mg, Nightly      sodium chloride 7 % nebulizer solution nebulizer solution   4 mL, 2 Times Daily      warfarin 4 MG tablet  Commonly known as: COUMADIN   Take one tablet by mouth on mon, wed, fri and take one-half of a tablet by mouth all other days or as directed             Stop These Medications      carvedilol 3.125 MG tablet  Commonly known as: COREG              Allergies   Allergen Reactions    Accupril [Quinapril Hcl] Swelling, Other (See Comments), GI Intolerance and Delirium     HA, constipation     Ahist [Chlorpheniramine] Nausea Only, Other (See Comments) and Dizziness     Headache, Blurred vision    Clarithromycin Nausea Only, Other (See Comments) and Mental Status Change     HA, Depression, Flushing    Esomeprazole GI Intolerance    Latex Other (See Comments)     Skin breakdown    Levalbuterol Swelling    Levocetirizine Diarrhea and GI  Intolerance    Lipitor [Atorvastatin] Other (See Comments) and Myalgia     Dark urine    Omeprazole Nausea Only and Other (See Comments)     HA    Pravachol [Pravastatin] Nausea Only and GI Intolerance     Bloated, Constipation, HA    Sulindac Other (See Comments) and Myalgia     HA, joint pain, bruising    Valdecoxib Irritability    Chlorcyclizine Unknown - Low Severity    Chlorpheniramine-Phenylephrine Unknown - High Severity    Diclofenac Sodium Unknown - Low Severity    Diphenhydramine Unknown - Low Severity    Lodine [Etodolac] Unknown - Low Severity    Sulfa Antibiotics Unknown - Low Severity         Discharge Disposition:  Home or Self Care    Discharge Diet:  Diet Order   Procedures    Diet: Regular/House; Fluid Consistency: Thin (IDDSI 0)       Discharge Activity:       CODE STATUS:    Code Status and Medical Interventions: No CPR (Do Not Attempt to Resuscitate); Limited Support; No intubation (DNI)   Ordered at: 05/14/25 1420     Code Status (Patient has no pulse and is not breathing):    No CPR (Do Not Attempt to Resuscitate)     Medical Interventions (Patient has pulse or is breathing):    Limited Support     Medical Intervention Limits:    No intubation (DNI)       Future Appointments   Date Time Provider Department Center   8/11/2025 10:00 AM [facility] LCG [place] 40 DEVICE CHECK MG[facility] CD [id number] None   8/11/2025 10:30 AM Jonah Regalado Jr., MD [facility] CD LCG60 [facility]   10/16/2025  9:00 AM LAB CHAIR 3 CBC KRESGE  LAB [facility]RES [place]   10/16/2025  9:20 AM Lucien Larkin MD [facility] CBC [facility] [place]   10/16/2025  9:45 AM CHAIR 15 CBC KRE - SM BH INFUS KRE [facility]AG   3/26/2026 10:00 AM [facility] OP VAS RM 3 BH [facility] OVKR [facility]   3/26/2026 10:30 AM Patricia Steve APRN [facility] VS [facility]ACEY [facility]   4/16/2026  1:10 PM Mehul Miller MD [facility] ID JACEY [facility]      Contact information for follow-up providers       Hollie Donnelly MD .    Specialty: Geriatric Medicine  Contact information:  [address] [facility]  [address]  [phone]                       Contact information for after-discharge care       Destination       [facility] .    Service: Skilled Nursing  Contact information:  [address]  [facility] [id number] [phone]                                   Time Spent on Discharge:  Greater than 30 minutes      Isacc Garcia MD  [facility]  05/17/25  12:02 EDT

## 2025-05-17 NOTE — OUTREACH NOTE
Prep Survey      Flowsheet Row Responses   [facility] facility patient discharged from? [place]   Is LACE score < 7 ? No   Eligibility Not Eligible   What are the reasons patient is not eligible? Subacute Care Center  [[facility]]   Does the patient have one of the following disease processes/diagnoses(primary or secondary)? Other   Prep survey completed? Yes            MELISSA MOCK - Registered Nurse

## 2025-05-17 NOTE — NURSING NOTE
Discharge orders in place. Summary and AVS faxed to [facility]. Tele pack and IV access removed per protocol with tip intact. Discharge paperwork reviewed in detail and signed. Room scanned for all personal belongings to ensure nothing left behind. Several attempts to reach receiving nurse at [facility] to give report. Attempts x 3 with no response, voicemail left to return call. Patient transported via EMS Stretcher with all personal belongings.

## 2025-05-17 NOTE — PLAN OF CARE
Goal Outcome Evaluation:  Plan of Care Reviewed With: patient        Progress: improving  Outcome Evaluation: A&O x4, VSS, 5/16 Tx 7P, a-fib/vpaced, on RA, no c/o pain or nausea, coumadin held for elevated INR, Lasix & Coreg on hold for hypotension per MD, Q2 turns, skin and wound care, accumax pump and SCD's in place, plan to return to [facility] at D/C, plan of care on going, call light with in reach, bed alarm on

## 2025-05-19 LAB
BACTERIA SPEC AEROBE CULT: NORMAL
BACTERIA SPEC AEROBE CULT: NORMAL

## 2025-05-21 NOTE — CASE MANAGEMENT/SOCIAL WORK
Case Management Discharge Note      Final Note: [facility] SNF per [facility]         Selected Continued Care - Discharged on 5/17/2025 Admission date: 5/13/2025 - Discharge disposition: Home or Self Care      Destination Coordination complete.      Service Provider Services Address Phone Fax Patient Preferred    [facility] [address] [address] [phone] --              Durable Medical Equipment    No services have been selected for the patient.                Dialysis/Infusion    No services have been selected for the patient.                Home Medical Care    No services have been selected for the patient.                Therapy    No services have been selected for the patient.                Community Resources    No services have been selected for the patient.                Community & DME    No services have been selected for the patient.                    Selected Continued Care - Prior Encounters Includes continued care and service providers with selected services from prior encounters from 2/12/2025 to 5/17/2025      Discharged on 5/5/2025 Admission date: 4/25/2025 - Discharge disposition: Skilled Nursing Facility (DC - External)      Destination       Service Provider Services Address Phone Fax Patient Preferred    [facility] [address] [address] [phone] --                          Transportation Services  Ambulance: [facility]    Final Discharge Disposition Code: 03 - skilled nursing facility (SNF)

## 2025-05-23 NOTE — PROGRESS NOTES
MsBradley Von called with questions about how to continue warfarin following hospital discharge.  I reiterated to continue the warfarin the way she was taking before admission (only take 2 mg tonight - due to rapid INR increase).  [facility] will collect INR on 2/18 - called order to [facility].   none

## 2025-06-09 ENCOUNTER — ANTICOAGULATION VISIT (OUTPATIENT)
Dept: PHARMACY | Facility: HOSPITAL | Age: OVER 89
End: 2025-06-09
Payer: MEDICARE

## 2025-06-09 ENCOUNTER — TELEPHONE (OUTPATIENT)
Dept: ONCOLOGY | Facility: CLINIC | Age: OVER 89
End: 2025-06-09
Payer: MEDICARE

## 2025-06-09 DIAGNOSIS — I48.0 PAROXYSMAL ATRIAL FIBRILLATION: Primary | ICD-10-CM

## 2025-06-09 NOTE — PROGRESS NOTES
This encounter is for documentation purposes only. The patient is now being managed by an on-site doctor at [facility] (Dr. Barbosa). The medication management clinic will no longer be following for warfarin management. It has been a pleasure being a part of her care.

## 2025-06-09 NOTE — TELEPHONE ENCOUNTER
Called pt to confirm that she is no longer planning to receive IVIG infusions. She states she is not but will call back if the physician at [facility] decides it is necessary to resume. Future appointments cancelled.

## 2025-06-09 NOTE — TELEPHONE ENCOUNTER
Caller: Caitlyn Longoria    Relationship: Self    Best call back number: [phone]      What was the call regarding: PT IS GOING TO LIVE AT [facility] AND WILL BE UNDER THEIR ONSITE DOCS CARE REQ TO CX ALL UPCOMING INFUSIONS AND APPTS.

## (undated) DEVICE — SENSR O2 OXIMAX FNGR A/ 18IN NONSTR

## (undated) DEVICE — SINGLE USE SUCTION VALVE MAJ-209: Brand: SINGLE USE SUCTION VALVE (STERILE)

## (undated) DEVICE — Device

## (undated) DEVICE — SUREFIT, DUAL DISPERSIVE ELECTRODE, CONTACT QUALITY MONITOR: Brand: SUREFIT

## (undated) DEVICE — ADAPT SWVL FIBROPTIC BRONCH

## (undated) DEVICE — CANN O2 ETCO2 FITS ALL CONN CO2 SMPL A/ 7IN DISP LF

## (undated) DEVICE — VITAL SIGNS™ JACKSON-REES CIRCUITS: Brand: VITAL SIGNS™

## (undated) DEVICE — TUBING, SUCTION, 1/4" X 10', STRAIGHT: Brand: MEDLINE

## (undated) DEVICE — SINGLE USE BIOPSY VALVE MAJ-210: Brand: SINGLE USE BIOPSY VALVE (STERILE)

## (undated) DEVICE — 3M™ STERI-STRIP™ REINFORCED ADHESIVE SKIN CLOSURES, R1547, 1/2 IN X 4 IN (12 MM X 100 MM), 6 STRIPS/ENVELOPE: Brand: 3M™ STERI-STRIP™

## (undated) DEVICE — DRSNG SURESITE WNDW 4X4.5

## (undated) DEVICE — KT ORCA ORCAPOD DISP STRL

## (undated) DEVICE — LOU PACE DEFIB: Brand: MEDLINE INDUSTRIES, INC.

## (undated) DEVICE — MSK AIRWY LARYNG LMA PILOT SZ4

## (undated) DEVICE — BITEBLOCK OMNI BLOC

## (undated) DEVICE — ADAPT CLN BIOGUARD AIR/H2O DISP

## (undated) DEVICE — LN SMPL CO2 SHTRM SD STREAM W/M LUER

## (undated) DEVICE — LN SMPL O2 NASL/ORL SMART/CAPNOLINE PLS A/